# Patient Record
Sex: MALE | Race: WHITE | NOT HISPANIC OR LATINO | Employment: OTHER | URBAN - METROPOLITAN AREA
[De-identification: names, ages, dates, MRNs, and addresses within clinical notes are randomized per-mention and may not be internally consistent; named-entity substitution may affect disease eponyms.]

---

## 2017-01-03 ENCOUNTER — THERAPY VISIT (OUTPATIENT)
Dept: PHYSICAL THERAPY | Facility: CLINIC | Age: 82
End: 2017-01-03
Payer: COMMERCIAL

## 2017-01-03 DIAGNOSIS — M21.372 LEFT FOOT DROP: Primary | ICD-10-CM

## 2017-01-03 PROCEDURE — 97112 NEUROMUSCULAR REEDUCATION: CPT | Mod: GP | Performed by: PHYSICAL THERAPIST

## 2017-01-03 PROCEDURE — 97140 MANUAL THERAPY 1/> REGIONS: CPT | Mod: GP | Performed by: PHYSICAL THERAPIST

## 2017-01-03 PROCEDURE — 97110 THERAPEUTIC EXERCISES: CPT | Mod: GP | Performed by: PHYSICAL THERAPIST

## 2017-01-06 ENCOUNTER — ANTICOAGULATION THERAPY VISIT (OUTPATIENT)
Dept: ANTICOAGULATION | Facility: CLINIC | Age: 82
End: 2017-01-06

## 2017-01-06 DIAGNOSIS — I48.91 ATRIAL FIBRILLATION, UNSPECIFIED TYPE (H): Primary | ICD-10-CM

## 2017-01-06 DIAGNOSIS — I48.91 ATRIAL FIBRILLATION, UNSPECIFIED TYPE (H): ICD-10-CM

## 2017-01-06 DIAGNOSIS — Z79.01 LONG-TERM (CURRENT) USE OF ANTICOAGULANTS: ICD-10-CM

## 2017-01-06 LAB — INR PPP: 2.27 (ref 0.86–1.14)

## 2017-01-11 DIAGNOSIS — N18.30 CKD (CHRONIC KIDNEY DISEASE) STAGE 3, GFR 30-59 ML/MIN (H): Primary | ICD-10-CM

## 2017-01-11 NOTE — NURSING NOTE
Labs per clinic 2A protocol.  CKD 3  Last OV: New patient  Kristen Cortés LPN  Nephrology  [facility]  [phone]

## 2017-01-17 ENCOUNTER — THERAPY VISIT (OUTPATIENT)
Dept: PHYSICAL THERAPY | Facility: CLINIC | Age: 82
End: 2017-01-17
Payer: COMMERCIAL

## 2017-01-17 DIAGNOSIS — M21.372 LEFT FOOT DROP: Primary | ICD-10-CM

## 2017-01-17 PROCEDURE — 97140 MANUAL THERAPY 1/> REGIONS: CPT | Mod: GP | Performed by: PHYSICAL THERAPIST

## 2017-01-17 PROCEDURE — 97112 NEUROMUSCULAR REEDUCATION: CPT | Mod: GP | Performed by: PHYSICAL THERAPIST

## 2017-01-17 PROCEDURE — 97110 THERAPEUTIC EXERCISES: CPT | Mod: GP | Performed by: PHYSICAL THERAPIST

## 2017-01-25 ENCOUNTER — ANTICOAGULATION THERAPY VISIT (OUTPATIENT)
Dept: ANTICOAGULATION | Facility: CLINIC | Age: 82
End: 2017-01-25

## 2017-01-25 ENCOUNTER — OFFICE VISIT (OUTPATIENT)
Dept: NEPHROLOGY | Facility: CLINIC | Age: 82
End: 2017-01-25
Attending: INTERNAL MEDICINE
Payer: COMMERCIAL

## 2017-01-25 VITALS
WEIGHT: 151.8 LBS | TEMPERATURE: 98.4 F | OXYGEN SATURATION: 98 % | HEART RATE: 77 BPM | DIASTOLIC BLOOD PRESSURE: 59 MMHG | HEIGHT: 68 IN | BODY MASS INDEX: 23.01 KG/M2 | SYSTOLIC BLOOD PRESSURE: 99 MMHG

## 2017-01-25 DIAGNOSIS — N18.5 CKD (CHRONIC KIDNEY DISEASE) STAGE 5, GFR LESS THAN 15 ML/MIN (H): Primary | ICD-10-CM

## 2017-01-25 DIAGNOSIS — Z79.01 LONG-TERM (CURRENT) USE OF ANTICOAGULANTS: ICD-10-CM

## 2017-01-25 DIAGNOSIS — I48.91 ATRIAL FIBRILLATION, UNSPECIFIED TYPE (H): Primary | ICD-10-CM

## 2017-01-25 DIAGNOSIS — N18.30 CKD (CHRONIC KIDNEY DISEASE) STAGE 3, GFR 30-59 ML/MIN (H): ICD-10-CM

## 2017-01-25 DIAGNOSIS — I48.91 ATRIAL FIBRILLATION, UNSPECIFIED TYPE (H): ICD-10-CM

## 2017-01-25 DIAGNOSIS — R70.0 ELEVATED ERYTHROCYTE SEDIMENTATION RATE: ICD-10-CM

## 2017-01-25 LAB
ALBUMIN SERPL-MCNC: 3.4 G/DL (ref 3.4–5)
ALBUMIN UR-MCNC: NEGATIVE MG/DL
ANION GAP SERPL CALCULATED.3IONS-SCNC: 8 MMOL/L (ref 3–14)
APPEARANCE UR: ABNORMAL
BILIRUB UR QL STRIP: NEGATIVE
BUN SERPL-MCNC: 41 MG/DL (ref 7–30)
CALCIUM SERPL-MCNC: 8.9 MG/DL (ref 8.5–10.1)
CHLORIDE SERPL-SCNC: 108 MMOL/L (ref 94–109)
CO2 SERPL-SCNC: 25 MMOL/L (ref 20–32)
COLOR UR AUTO: YELLOW
CREAT SERPL-MCNC: 1.23 MG/DL (ref 0.66–1.25)
CREAT UR-MCNC: 159 MG/DL
CRP SERPL-MCNC: <2.9 MG/L (ref 0–8)
ERYTHROCYTE [DISTWIDTH] IN BLOOD BY AUTOMATED COUNT: 15.4 % (ref 10–15)
ERYTHROCYTE [SEDIMENTATION RATE] IN BLOOD BY WESTERGREN METHOD: 80 MM/H (ref 0–20)
FERRITIN SERPL-MCNC: 46 NG/ML (ref 26–388)
GFR SERPL CREATININE-BSD FRML MDRD: 56 ML/MIN/1.7M2
GLUCOSE SERPL-MCNC: 147 MG/DL (ref 70–99)
GLUCOSE UR STRIP-MCNC: NEGATIVE MG/DL
HCT VFR BLD AUTO: 32.9 % (ref 40–53)
HGB BLD-MCNC: 10.4 G/DL (ref 13.3–17.7)
HGB UR QL STRIP: NEGATIVE
HYALINE CASTS #/AREA URNS LPF: 11 /LPF (ref 0–2)
INR PPP: 1.67 (ref 0.86–1.14)
IRON SATN MFR SERPL: 24 % (ref 15–46)
IRON SERPL-MCNC: 75 UG/DL (ref 35–180)
KETONES UR STRIP-MCNC: NEGATIVE MG/DL
LEUKOCYTE ESTERASE UR QL STRIP: NEGATIVE
MCH RBC QN AUTO: 29.6 PG (ref 26.5–33)
MCHC RBC AUTO-ENTMCNC: 31.6 G/DL (ref 31.5–36.5)
MCV RBC AUTO: 94 FL (ref 78–100)
MUCOUS THREADS #/AREA URNS LPF: PRESENT /LPF
NITRATE UR QL: NEGATIVE
PH UR STRIP: 5 PH (ref 5–7)
PHOSPHATE SERPL-MCNC: 2.1 MG/DL (ref 2.5–4.5)
PLATELET # BLD AUTO: 203 10E9/L (ref 150–450)
POTASSIUM SERPL-SCNC: 4.2 MMOL/L (ref 3.4–5.3)
PROT UR-MCNC: 0.23 G/L
PROT/CREAT 24H UR: 0.14 G/G CR (ref 0–0.2)
PTH-INTACT SERPL-MCNC: 43 PG/ML (ref 12–72)
RBC # BLD AUTO: 3.51 10E12/L (ref 4.4–5.9)
RBC #/AREA URNS AUTO: 1 /HPF (ref 0–2)
SODIUM SERPL-SCNC: 142 MMOL/L (ref 133–144)
SP GR UR STRIP: 1.02 (ref 1–1.03)
SQUAMOUS #/AREA URNS AUTO: <1 /HPF (ref 0–1)
TIBC SERPL-MCNC: 311 UG/DL (ref 240–430)
URN SPEC COLLECT METH UR: ABNORMAL
UROBILINOGEN UR STRIP-MCNC: 0 MG/DL (ref 0–2)
WBC # BLD AUTO: 7 10E9/L (ref 4–11)
WBC #/AREA URNS AUTO: 1 /HPF (ref 0–2)

## 2017-01-25 PROCEDURE — 83970 ASSAY OF PARATHORMONE: CPT | Performed by: INTERNAL MEDICINE

## 2017-01-25 PROCEDURE — 81001 URINALYSIS AUTO W/SCOPE: CPT | Performed by: INTERNAL MEDICINE

## 2017-01-25 PROCEDURE — 80069 RENAL FUNCTION PANEL: CPT | Performed by: INTERNAL MEDICINE

## 2017-01-25 PROCEDURE — 83540 ASSAY OF IRON: CPT | Performed by: INTERNAL MEDICINE

## 2017-01-25 PROCEDURE — 85027 COMPLETE CBC AUTOMATED: CPT | Performed by: INTERNAL MEDICINE

## 2017-01-25 PROCEDURE — 82728 ASSAY OF FERRITIN: CPT | Performed by: INTERNAL MEDICINE

## 2017-01-25 PROCEDURE — 83550 IRON BINDING TEST: CPT | Performed by: INTERNAL MEDICINE

## 2017-01-25 PROCEDURE — 84156 ASSAY OF PROTEIN URINE: CPT | Performed by: INTERNAL MEDICINE

## 2017-01-25 PROCEDURE — 85610 PROTHROMBIN TIME: CPT | Performed by: INTERNAL MEDICINE

## 2017-01-25 PROCEDURE — 00000402 ZZHCL STATISTIC TOTAL PROTEIN: Performed by: INTERNAL MEDICINE

## 2017-01-25 PROCEDURE — 36415 COLL VENOUS BLD VENIPUNCTURE: CPT | Performed by: INTERNAL MEDICINE

## 2017-01-25 PROCEDURE — 82306 VITAMIN D 25 HYDROXY: CPT | Performed by: INTERNAL MEDICINE

## 2017-01-25 PROCEDURE — 99213 OFFICE O/P EST LOW 20 MIN: CPT | Mod: ZF

## 2017-01-25 ASSESSMENT — PAIN SCALES - GENERAL: PAINLEVEL: NO PAIN (0)

## 2017-01-25 NOTE — NURSING NOTE
"Chief Complaint   Patient presents with     Consult For     FOLLOW UP CKD STAGE 5       Initial BP 99/59 mmHg  Pulse 77  Temp(Src) 98.4  F (36.9  C) (Oral)  Ht 1.727 m (5' 8\")  Wt 68.856 kg (151 lb 12.8 oz)  BMI 23.09 kg/m2  SpO2 98% Estimated body mass index is 23.09 kg/(m^2) as calculated from the following:    Height as of this encounter: 1.727 m (5' 8\").    Weight as of this encounter: 68.856 kg (151 lb 12.8 oz).  BP completed using cuff size: denise BRUCE CMA    "

## 2017-01-25 NOTE — Clinical Note
1/25/2017       RE: Noe Florence  [address] [facility] [address]     Dear Colleague,    Thank you for referring your patient, Noe Florence, to the [facility] NEPHROLOGY at [facility]. Please see a copy of my visit note below.    REASON FOR VISIT:  Mr. Florence is an 81-year-old gentleman referred by Dr. Baird for evaluation of CKD.       HISTORY OF PRESENT ILLNESS:  The patient has a creatinine of 1.2 which has been stable in that range since 2005.  However, presumably he has lost muscle mass over that period of time, so the creatinine may have been rising slowly.  Urinalysis has been bland.  He does not take over-the-counter medications.  He does not have voiding problems but is on doxazosin for voiding symptoms, so presumably this has been a complaint in the past.  He has a long history of cardiovascular disease including bypass grafting, drug-eluting stents, V-tach with ICD placement, history of atrial fibrillation and atrial flutter, status post a cavotricuspid isthmus ablation in 2014, and a right atrial appendage ablation 3 months later in 2014.  He is followed in Dr. Baird's clinic for tachycardia and has been doing well.       Today in clinic he is hypotensive with blood pressures in the 90s.  He denies dizziness but has been fatigued.  He is on a number of medications which might lower blood pressures including metoprolol 25 b.i.d., doxazosin and Zoloft.       The patient has unintentional weight loss of 15 pounds since September documented in the clinic chart.  He is on Remeron.  He has a history of colon cancer and is being reevaluated for that.  He is on an antidepressant because of poor appetite and symptoms of depression.       REVIEW OF SYSTEMS:  The patient denies chest pain, shortness of breath or dizziness.  He does say he has balance problems which he thinks might be inner ear-related.  He also has tinnitus which has been a chronic problem for him.  He  has had no recent cardiac events.  He does have bilateral lower extremity swelling.  He has no problems with diarrhea.  Appetite is poor.       PAST MEDICAL HISTORY:   1.  Hypertension.    2.  CKD, stage III.   3.  Coronary artery disease, status post CABG x2, JEREMY x2.   4.  History of DVT, on chronic anticoagulation.   5.  Colon cancer, status post colectomy.   6.  Polymorphic V-tach.   7.  Ischemic cardiomyopathy.   8.  History of SVT.    9.  Status post multiple ablations for atrial flutter.   10.  Status post right and left knee surgery.    11.  Status post right rotator cuff surgery.      SOCIAL HISTORY:  He is a retired trade .  He worked at the [employer] for the past 26 years.  He smoked from age 13 to age 33 (20 pack years) but quit at 33.  He is a very good .  He used to play tennis 5-6 times per week until age 79.       FAMILY HISTORY:  His father had coronary artery disease.  No one in the family has renal problems.      CURRENT MEDICATIONS:  See below.      PHYSICAL EXAMINATION:   VITAL SIGNS:  Blood pressure 97/57, heart rate 77, weight 151 pounds, BMI 23.  Weight in September was 165 pounds.    GENERAL:  He appears his stated age.   SKIN:  He has extensive actinic keratoses covering his forehead, arms and face.  It is difficult to determine whether any of these are malignant, but he has a number of suspicious lesions.    LUNGS:  Clear bilaterally.    CARDIAC:  Regular sinus rhythm with a 2/6 systolic ejection murmur.  No left ventricular heave.    ABDOMEN:  Soft.  Multiple skin lesions on the abdomen.  No hepatosplenomegaly.  No bruits over the kidneys.   LOWER EXTREMITIES:  He has 1+ edema bilaterally.  Increased erythema in the lower extremities suggestive of poor circulation.  No chronic skin changes of stasis.    NEUROLOGIC:  His speech is slightly slow, but overall his answers are clear and complete.  He is ambulatory and able to get to the  examining table and has a slightly wide-based gait.       LABORATORY TESTS:  Creatinine 1.2, BUN 42.     Electrolytes/Renal -   Recent Labs   Lab Test  01/25/17   1427  11/16/16   1815  09/26/16   1708  09/19/16   1031   04/10/14   0606   01/16/14   1248   05/12/12   0625   NA  142   --   141  140   < >  141   < >  140   < >  140   POTASSIUM  4.2   --   4.5  4.2   < >  4.2   < >  5.3   < >  4.9   CHLORIDE  108   --   110*  107   < >  104   < >  110*   < >  111*   CO2  25   --   26  26   < >  29   < >  23   < >  21   BUN  41*   --   35*  39*   < >  21   < >  43*   < >  26   CR  1.23   --   1.30*  1.52*   < >  1.27*   < >  1.52*   < >  1.34*   GLC  147*   --   93  112*   < >  147*   < >  96   < >  92   KEITH  8.9   --   9.0  8.9   < >  8.6   < >  9.2   < >  8.7   MAG   --   2.2   --    --    --   1.9   --    --    --   1.8   PHOS  2.1*   --    --    --    --    --    --   3.5   --    --     < > = values in this interval not displayed.       CBC -   Recent Labs   Lab Test  01/25/17 1427 09/30/16   1607  09/19/16   1031   WBC  7.0  8.5  8.0   HGB  10.4*  11.0*  10.7*   PLT  203  261  206       LFTs -   Recent Labs   Lab Test  01/25/17   1427 09/26/16   1708  09/19/16   1031  03/16/16   1133   ALKPHOS   --   124  113  96   BILITOTAL   --   0.6  0.4  0.3   ALT   --   16  15  18   AST   --   15  14  17   PROTTOTAL   --   7.9  7.5  7.0   ALBUMIN  3.4  3.2*  3.4  3.2*       Coags -   Recent Labs   Lab Test  01/25/17   1427 01/06/17   1557  12/19/16   1602   09/09/14   0726   04/15/12   1603   INR  1.67*  2.27*  2.03*   < >  2.83*   < >  1.15*   PTT   --    --    --    --   35   --   31    < > = values in this interval not displayed.       Iron Panel -   Recent Labs   Lab Test  01/25/17   1427  06/17/14   1211   08/26/09   1045   IRON  75  45   --   44   IRONSAT  24  14*   --    --    PEBBLES  46  51   < >  262    < > = values in this interval not displayed.       Endocrine -   Recent Labs   Lab Test  11/16/16   6649   02/27/12   1318  01/27/11   1432  08/26/09   1045  01/16/09   0523   A1C   --   5.7  6.0   --   6.2*   TSH  0.94   --    --   0.93   --           IMAGING:  Abdominal CT scan in 09/2016 shows slight cortical irregularity and atrophy bilaterally in the kidneys, but no hydronephrosis.  He has extensive atherosclerotic changes.       IMPRESSION:   1.  Chronic kidney disease.  The patient has stable CKD, although the stable creatinine might be misleading since the patient has lost muscle mass in the last 10 years.  Of note, he was a  2 years ago and now is barely able to walk across the room.  I doubt his CKD is contributing to his overall fatigue and weight loss.  Other parameters such as anemia and parathyroid hormone are in the normal range.  Urinalysis is bland.  His recent CT scan suggests chronically small kidneys, most consistent with CKD. Urinalysis shows hyaline casts which is consistent with renal underperfusion.  I would like to avoid hypotension if possible.  No further evaluation of CKD is indicated.      2.  Hypotension.  He is on a beta blocker and doxazosin.  I suggested he stop the doxazosin even though I do not think it is contributing much.  I asked him to contact his cardiologist about stopping the metoprolol.  He might have a risk of an arrhythmia which would outweigh the benefit of an improved blood pressure.  However, he might feel better if his systolic pressure was higher.       3.  Weight loss.  He has unintentional weight loss documented of 15 pounds over the past 4 months.  We discussed a general differential for this including the possibility of recurrent cancer, depression, or advancing heart disease contributing to weight loss.  His wife will discuss this further with their primary care provider who is already addressing most of these issues.  PCP to pursue possibilities including depression, cardiac disease, malignancy.  I do not think CKD is contributing to his weight loss.      4.  Anemia.  Hemoglobin is stable at 10.4.  It might be decreased because of CKD.  He would not be a candidate for EPO with this hemoglobin.  We will continue to monitor.      PLAN:   1.  Monitor creatinine.   2. Stop beta blocker if possible.   3.  Stop doxazosin and monitor for voiding difficulty.    4.  Agree with PT.      Current Outpatient Prescriptions   Medication Sig Dispense Refill     cyanocobalamin (VITAMIN  B-12) 1000 MCG tablet Take 2 tablets (2,000 mcg) by mouth daily 180 tablet 3     sulfamethoxazole-trimethoprim (BACTRIM,SEPTRA) 400-80 MG per tablet Take 1 tablet by mouth 2 times daily 14 tablet 1     doxazosin (CARDURA) 2 MG tablet Take 1/2 tab in the morning and 1/2 tab in the evening 90 tablet 3     atorvastatin (LIPITOR) 40 MG tablet Take 1 tablet (40 mg) by mouth daily 90 tablet 3     metoprolol (LOPRESSOR) 25 MG tablet Take 1 tablet (25 mg) by mouth 2 times daily 180 tablet 3     warfarin (COUMADIN) 5 MG tablet 7.5 mg on Wed; 5 mg all other days  or as instructed by coumadin clinic. 35 tablet 5     ciclopirox (LOPROX) 0.77 % cream Apply topically 2 times daily To feet and toenails. 90 g 6     triamcinolone (KENALOG) 0.1 % cream Apply topically 2 times daily For up to two weeks in a row 80 g 3     ketoconazole (NIZORAL) 2 % cream Apply topically daily On hold 60 g 3     loratadine (CLARITIN) 10 MG tablet Take 10 mg by mouth as needed        fluticasone (FLONASE) 50 MCG/ACT nasal spray Spray 2 sprays into both nostrils daily (Patient taking differently: Spray 2 sprays into both nostrils as needed ) 3 Package 0     Multiple Vitamins-Minerals (CENTRUM SILVER) per tablet Take 1 tablet by mouth daily. AM        aspirin 81 MG tablet Take 1 tablet by mouth daily.       mirtazapine (REMERON) 15 MG tablet Take 15 mg by mouth At Bedtime.       sertraline (ZOLOFT) 100 MG tablet Take 100 mg by mouth every evening.          ALIA QUIGLEY MD             D: 01/25/2017 19:02   T: 01/26/2017 08:34   MT:  DP      Name:     LEROY MACHADO   MRN:      [id number]        Account:      [id number]   :      1935           Service Date: 2017      Document: [id number]

## 2017-01-25 NOTE — PATIENT INSTRUCTIONS
1.  BP is low  2.  Contact cardiologist re stopping metoprolol  3. Consider stopping cardura  4.  You have chronic kidney disease that is currently stable  5.  Avoid over-the-counter meds such as ibuprofen or naprosyn  6.  See a dermatologist

## 2017-01-25 NOTE — PROGRESS NOTES
ANTICOAGULATION FOLLOW-UP CLINIC VISIT    Patient Name:  Noe Florence  Date:  1/25/2017  Contact Type:  Telephone    SUBJECTIVE:        OBJECTIVE    INR   Date Value Ref Range Status   01/25/2017 1.67* 0.86 - 1.14 Final       ASSESSMENT / PLAN  No question data found.  Anticoagulation Summary as of 1/25/2017     INR goal 2.0-3.0   Selected INR 1.67! (1/25/2017)   Maintenance plan 2.5 mg (5 mg x 0.5) on Mon; 5 mg (5 mg x 1) all other days   Full instructions 1/25: 7.5 mg; Otherwise 2.5 mg on Mon; 5 mg all other days   Weekly total 32.5 mg   Plan last modified Roya Manning RN (12/22/2016)   Next INR check 1/30/2017   Priority INR   Target end date Indefinite    Indications   Atrial fibrillation (H) [I48.91] [I48.91]  Long-term (current) use of anticoagulants [Z79.01] [Z79.01]         Anticoagulation Episode Summary     INR check location     Preferred lab     Send INR reminders to [facility]    Comments Patient contact number: [phone]   Can leave results with Rica (friend)      Anticoagulation Care Providers     Provider Role Specialty Phone number    Deedee Baird MD Responsible Cardiology [phone]            See the Encounter Report to view Anticoagulation Flowsheet and Dosing Calendar (Go to Encounters tab in chart review, and find the Anticoagulation Therapy Visit)    LM for patient.    Leena Abebe RN

## 2017-01-25 NOTE — MR AVS SNAPSHOT
After Visit Summary   1/25/2017    Noe Florence    MRN: [id number]           Patient Information     Date Of Birth          1935        Visit Information        Provider Department      1/25/2017 2:40 PM Kathy Oneil MD [facility] Nephrology        Today's Diagnoses     CKD (chronic kidney disease) stage 5, GFR less than 15 ml/min (H)    -  1       Care Instructions    1.  BP is low  2.  Contact cardiologist re stopping metoprolol  3. Consider stopping cardura  4.  You have chronic kidney disease that is currently stable  5.  Avoid over-the-counter meds such as ibuprofen or naprosyn  6.  See a dermatologist            Follow-ups after your visit        Your next 10 appointments already scheduled     Jan 30, 2017  2:30 PM   Ech Complete with [address] [facility] ([facility])    [address] [place] [address] [phone]           1.  Please bring or wear a comfortable two-piece outfit. 2.  You may eat, drink and take your normal medicines. 3.  For any questions that cannot be answered, please contact the ordering physician            Jan 31, 2017 12:50 PM   ASHLEY Extremity with Cristal Francois PT   [facility]    [address] [place] [id number]               Mar 24, 2017 10:30 AM   LAB with [facility] ([facility])    [address] [place] [address] [phone]           Patient must bring picture ID.  Patient should be prepared to give a urine specimen  Please do not eat 10-12 hours before your appointment if you are coming in fasting for labs on lipids, cholesterol, or glucose (sugar).  Pregnant women should follow their Care Team instructions. Water with medications is okay. Do not drink coffee or other fluids.   If you have concerns about taking  your medications, please ask at office or  if scheduling via [employer], send a message by clicking on Secure Messaging, Message Your Care Team.            Mar 24, 2017 11:00 AM   (Arrive by 10:45 AM)   CT CHEST/ABDOMEN/PELVIS W CONTRAST with UCCT1   [facility] [place] [facility] ([facility])    [address] [id number] [phone]           Please bring any scans or X-rays taken at other hospitals, if similar tests were done. Also bring a list of your medicines, including vitamins, minerals and over-the-counter drugs. It is safest to leave personal items at home.  Be sure to tell your doctor:   If you have any allergies.   If there s any chance you are pregnant.   If you are breastfeeding.   If you have any special needs.  You may have contrast for this exam. To prepare:   Do not eat or drink for 2 hours before your exam. If you need to take medicine, you may take it with small sips of water. (We may ask you to take liquid medicine as well.)   The day before your exam, drink extra fluids at least six 8-ounce glasses (unless your doctor tells you to restrict your fluids).  Patients over 70 or patients with diabetes or kidney problems:   If you haven t had a blood test (creatinine test) within the last 30 days, go to your clinic or Diagnostic Imaging Department for this test.  If you have diabetes:   If your kidney function is normal, continue taking your metformin (Avandamet, Glucophage, Glucovance, Metaglip) on the day of your exam.   If your kidney function is abnormal, wait 48 hours before restarting this medicine.  You will have oral contrast for this exam:   You will drink the contrast at home. Get this from your clinic or Diagnostic Imaging Department. Please follow the directions given.  Please wear loose clothing, such as a sweat suit or jogging clothes. Avoid snaps, zippers and other metal. We may ask you to undress and put on a hospital gown.  If you have any questions, please call the Imaging  Department where you will have your exam.            Mar 27, 2017  3:00 PM   (Arrive by 2:45 PM)   Return Visit with Francisco Gilliam MD   [facility] ([facility])    [address]  [address]nd Floor  [facility] [address] [phone]            Jun 19, 2017  1:30 PM   (Arrive by 1:15 PM)   Implanted Defibulator with Uc Cv Device 1   [facility] ([facility])    [address]  3rd Floor  [facility] [address] [phone]            Oct 05, 2017  1:00 PM   RETURN GENERAL with Omero Srinivasan MD   [facility] ([facility])    [address]  [address] [address]  [facility] [id number] [phone]              Who to contact     If you have questions or need follow up information about today's clinic visit or your schedule please contact [facility] NEPH[facility] directly at [phone].  Normal or non-critical lab and imaging results will be communicated to you by MyChart, letter or phone within 4 business days after the clinic has received the results. If you do not hear from us within 7 days, please contact the clinic through MyChart or phone. If you have a critical or abnormal lab result, we will notify you by phone as soon as possible.  Submit refill requests through [employer] or call your pharmacy and they will forward the refill request to us. Please allow 3 business days for your refill to be completed.          Additional Information About Your Visit        [employer] Information     [employer] gives you secure access to your electronic health record. If you see a primary care provider, you can also send messages to your care team and make appointments. If you have questions, please call your primary care clinic.  If you do not have a primary care provider, please call [phone] and they will assist you.        Care EveryWhere ID     This is your Care EveryWhere ID. This could be  "used by other organizations to access your [place] medical records  [id number]        Your Vitals Were     Pulse Temperature Height BMI (Body Mass Index) Pulse Oximetry       77 98.4  F (36.9  C) (Oral) 1.727 m (5' 8\") 23.09 kg/m2 98%        Blood Pressure from Last 3 Encounters:   01/25/17 99/59   12/19/16 119/67   11/28/16 126/82    Weight from Last 3 Encounters:   01/25/17 68.856 kg (151 lb 12.8 oz)   12/19/16 69.582 kg (153 lb 6.4 oz)   11/28/16 69.536 kg (153 lb 4.8 oz)              Today, you had the following     No orders found for display         Today's Medication Changes          These changes are accurate as of: 1/25/17  4:06 PM.  If you have any questions, ask your nurse or doctor.               These medicines have changed or have updated prescriptions.        Dose/Directions    fluticasone 50 MCG/ACT spray   Commonly known as:  FLONASE   This may have changed:    - when to take this  - reasons to take this   Used for:  Unspecified sinusitis (chronic)        Dose:  2 spray   Spray 2 sprays into both nostrils daily   Quantity:  3 Package   Refills:  0                Primary Care Provider Office Phone # Fax #    Roberto Fabiano Sarmiento [phone]        [facility] [address] [facility] [address]  [facility] [address]        Thank you!     Thank you for choosing [facility] NEPHROLOGY  for your care. Our goal is always to provide you with excellent care. Hearing back from our patients is one way we can continue to improve our services. Please take a few minutes to complete the written survey that you may receive in the mail after your visit with us. Thank you!             Your Updated Medication List - Protect others around you: Learn how to safely use, store and throw away your medicines at www.disposemymeds.org.          This list is accurate as of: 1/25/17  4:06 PM.  Always use your most recent med list.                   Brand Name Dispense Instructions for use    aspirin 81 MG tablet     "  Take 1 tablet by mouth daily.       atorvastatin 40 MG tablet    LIPITOR    90 tablet    Take 1 tablet (40 mg) by mouth daily       CENTRUM SILVER per tablet      Take 1 tablet by mouth daily. AM       ciclopirox 0.77 % cream    LOPROX    90 g    Apply topically 2 times daily To feet and toenails.       cyanocobalamin 1000 MCG tablet    vitamin  B-12    180 tablet    Take 2 tablets (2,000 mcg) by mouth daily       doxazosin 2 MG tablet    CARDURA    90 tablet    Take 1/2 tab in the morning and 1/2 tab in the evening       fluticasone 50 MCG/ACT spray    FLONASE    3 Package    Spray 2 sprays into both nostrils daily       ketoconazole 2 % cream    NIZORAL    60 g    Apply topically daily On hold       loratadine 10 MG tablet    CLARITIN     Take 10 mg by mouth as needed       metoprolol 25 MG tablet    LOPRESSOR    180 tablet    Take 1 tablet (25 mg) by mouth 2 times daily       mirtazapine 15 MG tablet    REMERON     Take 15 mg by mouth At Bedtime.       sulfamethoxazole-trimethoprim 400-80 MG per tablet    BACTRIM/SEPTRA    14 tablet    Take 1 tablet by mouth 2 times daily       triamcinolone 0.1 % cream    KENALOG    80 g    Apply topically 2 times daily For up to two weeks in a row       warfarin 5 MG tablet    COUMADIN    35 tablet    7.5 mg on Wed; 5 mg all other days  or as instructed by coumadin clinic.       ZOLOFT 100 MG tablet   Generic drug:  sertraline      Take 100 mg by mouth every evening.

## 2017-01-26 ENCOUNTER — CARE COORDINATION (OUTPATIENT)
Dept: CARDIOLOGY | Facility: CLINIC | Age: 82
End: 2017-01-26

## 2017-01-26 DIAGNOSIS — I10 HYPERTENSION: Primary | ICD-10-CM

## 2017-01-26 LAB
ALBUMIN SERPL ELPH-MCNC: 3.8 G/DL (ref 3.7–5.1)
ALPHA1 GLOB SERPL ELPH-MCNC: 0.4 G/DL (ref 0.2–0.4)
ALPHA2 GLOB SERPL ELPH-MCNC: 0.8 G/DL (ref 0.5–0.9)
B-GLOBULIN SERPL ELPH-MCNC: 1.2 G/DL (ref 0.6–1)
DEPRECATED CALCIDIOL+CALCIFEROL SERPL-MC: 46 UG/L (ref 20–75)
GAMMA GLOB SERPL ELPH-MCNC: 1.3 G/DL (ref 0.7–1.6)
M PROTEIN SERPL ELPH-MCNC: 0.1 G/DL
PROT PATTERN SERPL ELPH-IMP: ABNORMAL

## 2017-01-26 NOTE — PROGRESS NOTES
REASON FOR VISIT:  Mr. Florence is an 81-year-old gentleman referred by Dr. Baird for evaluation of CKD.       HISTORY OF PRESENT ILLNESS:  The patient has a creatinine of 1.2 which has been stable in that range since 2005.  However, presumably he has lost muscle mass over that period of time, so the creatinine may have been rising slowly.  Urinalysis has been bland.  He does not take over-the-counter medications.  He does not have voiding problems but is on doxazosin for voiding symptoms, so presumably this has been a complaint in the past.  He has a long history of cardiovascular disease including bypass grafting, drug-eluting stents, V-tach with ICD placement, history of atrial fibrillation and atrial flutter, status post a cavotricuspid isthmus ablation in 2014, and a right atrial appendage ablation 3 months later in 2014.  He is followed in Dr. Baird's clinic for tachycardia and has been doing well.       Today in clinic he is hypotensive with blood pressures in the 90s.  He denies dizziness but has been fatigued.  He is on a number of medications which might lower blood pressures including metoprolol 25 b.i.d., doxazosin and Zoloft.       The patient has unintentional weight loss of 15 pounds since September documented in the clinic chart.  He is on Remeron.  He has a history of colon cancer and is being reevaluated for that.  He is on an antidepressant because of poor appetite and symptoms of depression.       REVIEW OF SYSTEMS:  The patient denies chest pain, shortness of breath or dizziness.  He does say he has balance problems which he thinks might be inner ear-related.  He also has tinnitus which has been a chronic problem for him.  He has had no recent cardiac events.  He does have bilateral lower extremity swelling.  He has no problems with diarrhea.  Appetite is poor.  Comprehensive ROS completed and neg except as above.     PAST MEDICAL HISTORY:   1.  Hypertension.    2.  CKD, stage III.   3.  Coronary  artery disease, status post CABG x2, JEREMY x2.   4.  History of DVT, on chronic anticoagulation.   5.  Colon cancer, status post colectomy.   6.  Polymorphic V-tach.   7.  Ischemic cardiomyopathy.   8.  History of SVT.    9.  Status post multiple ablations for atrial flutter.   10.  Status post right and left knee surgery.    11.  Status post right rotator cuff surgery.      SOCIAL HISTORY:  He is a retired trade .  He worked at the [employer] for the past 26 years.  He smoked from age 13 to age 33 (20 pack years) but quit at 33.  He is a very good .  He used to play tennis 5-6 times per week until age 79.       FAMILY HISTORY:  His father had coronary artery disease.  No one in the family has renal problems.      CURRENT MEDICATIONS:  See below.      PHYSICAL EXAMINATION:   VITAL SIGNS:  Blood pressure 97/57, heart rate 77, weight 151 pounds, BMI 23.  Weight in September was 165 pounds.    GENERAL:  He appears his stated age.   SKIN:  He has extensive actinic keratoses covering his forehead, arms and face.  It is difficult to determine whether any of these are malignant, but he has a number of suspicious lesions. HEENT: several missing teeth   NECK: no carotid bruits  LUNGS:  Clear bilaterally.    CARDIAC:  Regular sinus rhythm with a 2/6 systolic ejection murmur.  No left ventricular heave.    ABDOMEN:  Soft.  Multiple skin lesions on the abdomen.  No hepatosplenomegaly.  No bruits over the kidneys.   LOWER EXTREMITIES:  He has 1+ edema bilaterally.  Increased erythema in the lower extremities suggestive of poor circulation.  No chronic skin changes of stasis.    NEUROLOGIC:  His speech is slightly slow, but overall his answers are clear and complete.  He is ambulatory and able to get to the examining table and has a slightly wide-based gait.       LABORATORY TESTS:  Creatinine 1.2, BUN 42.     Electrolytes/Renal -   Recent Labs   Lab Test  01/25/17   1427  11/16/16    1815  09/26/16   1708  09/19/16   1031   04/10/14   0606   01/16/14   1248   05/12/12   0625   NA  142   --   141  140   < >  141   < >  140   < >  140   POTASSIUM  4.2   --   4.5  4.2   < >  4.2   < >  5.3   < >  4.9   CHLORIDE  108   --   110*  107   < >  104   < >  110*   < >  111*   CO2  25   --   26  26   < >  29   < >  23   < >  21   BUN  41*   --   35*  39*   < >  21   < >  43*   < >  26   CR  1.23   --   1.30*  1.52*   < >  1.27*   < >  1.52*   < >  1.34*   GLC  147*   --   93  112*   < >  147*   < >  96   < >  92   KEITH  8.9   --   9.0  8.9   < >  8.6   < >  9.2   < >  8.7   MAG   --   2.2   --    --    --   1.9   --    --    --   1.8   PHOS  2.1*   --    --    --    --    --    --   3.5   --    --     < > = values in this interval not displayed.       CBC -   Recent Labs   Lab Test  01/25/17   1427 09/30/16   1607  09/19/16   1031   WBC  7.0  8.5  8.0   HGB  10.4*  11.0*  10.7*   PLT  203  261  206       LFTs -   Recent Labs   Lab Test  01/25/17   1427 09/26/16   1708  09/19/16   1031  03/16/16   1133   ALKPHOS   --   124  113  96   BILITOTAL   --   0.6  0.4  0.3   ALT   --   16  15  18   AST   --   15  14  17   PROTTOTAL   --   7.9  7.5  7.0   ALBUMIN  3.4  3.2*  3.4  3.2*       Coags -   Recent Labs   Lab Test  01/25/17   1427  01/06/17   1557  12/19/16   1602   09/09/14   0726   04/15/12   1603   INR  1.67*  2.27*  2.03*   < >  2.83*   < >  1.15*   PTT   --    --    --    --   35   --   31    < > = values in this interval not displayed.       Iron Panel -   Recent Labs   Lab Test  01/25/17   1427  06/17/14   1211   08/26/09   1045   IRON  75  45   --   44   IRONSAT  24  14*   --    --    PEBBLES  46  51   < >  262    < > = values in this interval not displayed.       Endocrine -   Recent Labs   Lab Test  11/16/16   1815  02/27/12   1318  01/27/11   1432  08/26/09   1045  01/16/09   0523   A1C   --   5.7  6.0   --   6.2*   TSH  0.94   --    --   0.93   --           IMAGING:  Abdominal CT scan in 09/2016  shows slight cortical irregularity and atrophy bilaterally in the kidneys, but no hydronephrosis.  He has extensive atherosclerotic changes.       IMPRESSION:   1.  Chronic kidney disease.  The patient has stable CKD, although the stable creatinine might be misleading since the patient has lost muscle mass in the last 10 years.  Of note, he was a  2 years ago and now is barely able to walk across the room.  I doubt his CKD is contributing to his overall fatigue and weight loss.  Other parameters such as anemia and parathyroid hormone are in the normal range.  Urinalysis is bland.  His recent CT scan suggests chronically small kidneys, most consistent with CKD. Urinalysis shows hyaline casts which is consistent with renal underperfusion.  I would like to avoid hypotension if possible.  No further evaluation of CKD is indicated.      2.  Hypotension.  He is on a beta blocker and doxazosin.  I suggested he stop the doxazosin even though I do not think it is contributing much.  I asked him to contact his cardiologist about stopping the metoprolol.  He might have a risk of an arrhythmia which would outweigh the benefit of an improved blood pressure.  However, he might feel better if his systolic pressure was higher.       3.  Weight loss.  He has unintentional weight loss documented of 15 pounds over the past 4 months.  We discussed a general differential for this including the possibility of recurrent cancer, depression, or advancing heart disease contributing to weight loss.  His wife will discuss this further with their primary care provider who is already addressing most of these issues.  PCP to pursue possibilities including depression, cardiac disease, malignancy.  I do not think CKD is contributing to his weight loss.     4.  Anemia.  Hemoglobin is stable at 10.4.  It might be decreased because of CKD.  He would not be a candidate for EPO with this hemoglobin.  We will continue to monitor.      PLAN:    1.  Monitor creatinine.   2. Stop beta blocker if possible.   3.  Stop doxazosin and monitor for voiding difficulty.    4.  Agree with PT.      Current Outpatient Prescriptions   Medication Sig Dispense Refill     cyanocobalamin (VITAMIN  B-12) 1000 MCG tablet Take 2 tablets (2,000 mcg) by mouth daily 180 tablet 3     sulfamethoxazole-trimethoprim (BACTRIM,SEPTRA) 400-80 MG per tablet Take 1 tablet by mouth 2 times daily 14 tablet 1     doxazosin (CARDURA) 2 MG tablet Take 1/2 tab in the morning and 1/2 tab in the evening 90 tablet 3     atorvastatin (LIPITOR) 40 MG tablet Take 1 tablet (40 mg) by mouth daily 90 tablet 3     metoprolol (LOPRESSOR) 25 MG tablet Take 1 tablet (25 mg) by mouth 2 times daily 180 tablet 3     warfarin (COUMADIN) 5 MG tablet 7.5 mg on Wed; 5 mg all other days  or as instructed by coumadin clinic. 35 tablet 5     ciclopirox (LOPROX) 0.77 % cream Apply topically 2 times daily To feet and toenails. 90 g 6     triamcinolone (KENALOG) 0.1 % cream Apply topically 2 times daily For up to two weeks in a row 80 g 3     ketoconazole (NIZORAL) 2 % cream Apply topically daily On hold 60 g 3     loratadine (CLARITIN) 10 MG tablet Take 10 mg by mouth as needed        fluticasone (FLONASE) 50 MCG/ACT nasal spray Spray 2 sprays into both nostrils daily (Patient taking differently: Spray 2 sprays into both nostrils as needed ) 3 Package 0     Multiple Vitamins-Minerals (CENTRUM SILVER) per tablet Take 1 tablet by mouth daily. AM        aspirin 81 MG tablet Take 1 tablet by mouth daily.       mirtazapine (REMERON) 15 MG tablet Take 15 mg by mouth At Bedtime.       sertraline (ZOLOFT) 100 MG tablet Take 100 mg by mouth every evening.          ALIA QUIGLEY MD             D: 2017 19:02   T: 2017 08:34   MT: DP      Name:     LEROY MACHADO   MRN:      [id number]        Account:      [id number]   :      1935           Service Date: 2017      Document: [id number]

## 2017-01-26 NOTE — PROGRESS NOTES
Received call from pt stating he was by nephrology yesterday (1/25/17), Dr. Quigley, see below - pt is wondering if anything has been decided about stopping the metoprolol. BP 90's/50's HR 70's. He said he has no symptoms of lightheadedness or dizziness.     IMPRESSION:    2.  Hypotension.  He is on a beta blocker and doxazosin.  I suggested he stop the doxazosin even though I do not think it is contributing much.  I asked him to contact his cardiologist about stopping the metoprolol.  He might have a risk of an arrhythmia which would outweigh the benefit of an improved blood pressure.  However, he might feel better if his systolic pressure was higher.      PLAN:    2. Stop beta blocker if possible.      ALIA QUIGLEY MD         He would like a call back [phone].  Will route to Lashawn Cool RN and Dr. Baird

## 2017-01-27 NOTE — PROGRESS NOTES
Spoke patient. He still states he is asymptomatic with these low blood pressures. This is the only low blood pressure reading - he is typically 110-120s/70s. He will continue on same medication regimen. Told patient we base decision on medication changes on symptoms rather than the number with hypotension.    Blood pressure cuff Rx sent to preferred pharmacy. Patient will look at cost and decide whether he will purchase.    Patient verbalized understanding and is in agreement to the plan.

## 2017-01-30 ENCOUNTER — RADIANT APPOINTMENT (OUTPATIENT)
Dept: CARDIOLOGY | Facility: CLINIC | Age: 82
End: 2017-01-30
Attending: INTERNAL MEDICINE

## 2017-01-30 DIAGNOSIS — Z98.890 S/P ABLATION OF ATRIAL FLUTTER: ICD-10-CM

## 2017-01-30 DIAGNOSIS — I47.29 NSVT (NONSUSTAINED VENTRICULAR TACHYCARDIA) (H): ICD-10-CM

## 2017-01-30 DIAGNOSIS — Z86.79 S/P ABLATION OF ATRIAL FLUTTER: ICD-10-CM

## 2017-01-31 ENCOUNTER — THERAPY VISIT (OUTPATIENT)
Dept: PHYSICAL THERAPY | Facility: CLINIC | Age: 82
End: 2017-01-31
Payer: COMMERCIAL

## 2017-01-31 DIAGNOSIS — M21.372 LEFT FOOT DROP: Primary | ICD-10-CM

## 2017-01-31 PROCEDURE — 97112 NEUROMUSCULAR REEDUCATION: CPT | Mod: GP | Performed by: PHYSICAL THERAPIST

## 2017-01-31 PROCEDURE — 97140 MANUAL THERAPY 1/> REGIONS: CPT | Mod: GP | Performed by: PHYSICAL THERAPIST

## 2017-01-31 PROCEDURE — 97110 THERAPEUTIC EXERCISES: CPT | Mod: GP | Performed by: PHYSICAL THERAPIST

## 2017-01-31 NOTE — PROGRESS NOTES
Subjective:    HPI                    Objective:    System    Physical Exam    General     ROS    Assessment/Plan:      DISCHARGE REPORT    Progress reporting period is from 11- to 1-.       SUBJECTIVE  Subjective changes noted by patient: Patient reports that he has more confidence with walking and has not fallen for over 1 month. Sha uses AFO as needed and feels comfortable continuing with independent home program to self manage symptms.       Current pain level is 0/10 .     Previous pain level was  0/10 .   Changes in function:  Yes (See Goal flowsheet attached for changes in current functional level)  Adverse reaction to treatment or activity: None    OBJECTIVE  Changes noted in objective findings:  {Ankle/Foot Evaluation  ROM:     AROM:     Dorsiflexion: Left:   3  Right:  WNL   Plantarflexion: Left:  15    Right:  WNL  Inversion: Left:  15     Right:  25  Eversion: 5     Right:8         Strength:    Dorsiflexion:  Left: 3+/5-4-/5     Pain:   5/5   Plantarflexion: Left: 3-/5   Pain:  3-/5    Inversion:Left: 5/5  Pain:     Right: 5/5  Pain:  Eversion:Left: 5/5  Pain:  Right: 5/5  Pain:        Posterior Tibialis: Left: 4-/-4/55  Pain:  Right: 5/5  Pain:  Peroneals: Left: 4-/5-4/5  Pain:  5/5          PALPATION: Palpation of ankle: unremarkable.    EDEMA: normal           MOBILITY TESTING:       Talocrural Left:  Improved mobililty noted        Subtalar Left:Improved  hypomobile noted      Midtarsal Left: Improved hypomobile noted        Standing: Increased weight bearing on right lower leg, increased left anterior tibialis, calf  and toe extensor muscle atrophy Gait: Ambulates with steppage gait and not able to get off the shelf AFO in his shoe. Left  2-3 toes ( significant hammer toes), mmt: EHL: 3/5, edl 2-3( no contraction evident, 4-5 2/5)  , Anterior tibialis: 3+/5-4-/5      Single leg balance:  9 seconds (L), 1 second (L)  Assessment/Plan:        ASSESSMENT/PLAN  Updated problem list and  treatment plan: Diagnosis 1:  eft drop foot  Decreased strength - therapeutic exercise, therapeutic activities and home program  Impaired balance - neuro re-education, therapeutic activities and home program  Impaired gait - gait training and home program  Impaired muscle performance - neuro re-education and home program  Decreased function - therapeutic activities and home program  STG/LTGs have been met or progress has been made towards goals:  Yes (See Goal flow sheet completed today.)  Assessment of Progress: The patient's condition is improving.  Self Management Plans:  Patient has been instructed in a home treatment program.  Patient is independent in a home treatment program.  Patient  has been instructed in self management of symptoms.  Patient is independent in self management of symptoms.  I have re-evaluated this patient and find that the nature, scope, duration and intensity of the therapy is appropriate for the medical condition of the patient.  Noe continues to require the following intervention to meet STG and LTG's:  PT intervention is no longer required to meet STG/LTG.    Recommendations:  This patient is ready to be discharged from therapy and continue their home treatment program.    Please refer to the daily flowsheet for treatment today, total treatment time and time spent performing 1:1 timed codes.

## 2017-02-07 DIAGNOSIS — D47.2 MONOCLONAL GAMMOPATHY PRESENT ON SERUM PROTEIN ELECTROPHORESIS: Primary | ICD-10-CM

## 2017-02-10 ENCOUNTER — ANTICOAGULATION THERAPY VISIT (OUTPATIENT)
Dept: ANTICOAGULATION | Facility: CLINIC | Age: 82
End: 2017-02-10

## 2017-02-10 DIAGNOSIS — D47.2 MONOCLONAL GAMMOPATHY PRESENT ON SERUM PROTEIN ELECTROPHORESIS: ICD-10-CM

## 2017-02-10 DIAGNOSIS — I48.91 ATRIAL FIBRILLATION, UNSPECIFIED TYPE (H): Primary | ICD-10-CM

## 2017-02-10 DIAGNOSIS — Z79.01 LONG-TERM (CURRENT) USE OF ANTICOAGULANTS: ICD-10-CM

## 2017-02-10 DIAGNOSIS — I48.91 ATRIAL FIBRILLATION, UNSPECIFIED TYPE (H): ICD-10-CM

## 2017-02-10 LAB — INR PPP: 1.8 (ref 0.86–1.14)

## 2017-02-10 NOTE — PROGRESS NOTES
ANTICOAGULATION FOLLOW-UP CLINIC VISIT    Patient Name:  Noe Florence  Date:  2/10/2017  Contact Type:  Telephone    SUBJECTIVE:        OBJECTIVE    INR   Date Value Ref Range Status   02/10/2017 1.80* 0.86 - 1.14 Final       ASSESSMENT / PLAN  INR assessment THER    Recheck INR In: 2 WEEKS    INR Location Clinic      Anticoagulation Summary as of 2/10/2017     INR goal 2.0-3.0   Selected INR 1.80! (2/10/2017)   Maintenance plan 5 mg (5 mg x 1) every day   Full instructions 5 mg every day   Weekly total 35 mg   Plan last modified Danya Khan, RN (2/10/2017)   Next INR check 2/24/2017   Priority INR   Target end date Indefinite    Indications   Atrial fibrillation (H) [I48.91] [I48.91]  Long-term (current) use of anticoagulants [Z79.01] [Z79.01]         Anticoagulation Episode Summary     INR check location     Preferred lab     Send INR reminders to [facility]    Comments Patient contact number: [phone]   Can leave results with Rica (friend)      Anticoagulation Care Providers     Provider Role Specialty Phone number    Deedee Baird MD Responsible Cardiology [phone]            See the Encounter Report to view Anticoagulation Flowsheet and Dosing Calendar (Go to Encounters tab in chart review, and find the Anticoagulation Therapy Visit)    Left message with results and dosing recommendations. Asked patient to call back to report any missed doses, falls, signs and symptoms of bleeding or clotting, or any changes to health or diet.     Danya Khan, RN

## 2017-02-13 LAB
IGA SERPL-MCNC: 575 MG/DL (ref 70–380)
IGG SERPL-MCNC: 1200 MG/DL (ref 695–1620)
IGM SERPL-MCNC: 113 MG/DL (ref 60–265)
IMMUNOFIX ELP, URINE: NORMAL
IMMUNOFIXATION ELP: ABNORMAL

## 2017-02-24 DIAGNOSIS — I48.91 ATRIAL FIBRILLATION, UNSPECIFIED TYPE (H): ICD-10-CM

## 2017-02-24 DIAGNOSIS — Z79.01 LONG-TERM (CURRENT) USE OF ANTICOAGULANTS: ICD-10-CM

## 2017-02-24 LAB — INR PPP: 2.52 (ref 0.86–1.14)

## 2017-02-27 ENCOUNTER — ANTICOAGULATION THERAPY VISIT (OUTPATIENT)
Dept: ANTICOAGULATION | Facility: CLINIC | Age: 82
End: 2017-02-27

## 2017-02-27 DIAGNOSIS — I48.91 ATRIAL FIBRILLATION, UNSPECIFIED TYPE (H): ICD-10-CM

## 2017-02-27 DIAGNOSIS — Z79.01 LONG-TERM (CURRENT) USE OF ANTICOAGULANTS: ICD-10-CM

## 2017-02-27 NOTE — PROGRESS NOTES
ANTICOAGULATION FOLLOW-UP CLINIC VISIT    Patient Name:  Noe Florence  Date:  2/27/2017  Contact Type:  Telephone    SUBJECTIVE:     Patient Findings     Positives No Problem Findings           OBJECTIVE    INR   Date Value Ref Range Status   02/24/2017 2.52 (H) 0.86 - 1.14 Final       ASSESSMENT / PLAN  INR assessment THER    Recheck INR In: 2 WEEKS    INR Location Clinic      Anticoagulation Summary as of 2/27/2017     INR goal 2.0-3.0   Today's INR 2.52 (2/24/2017)   Maintenance plan 5 mg (5 mg x 1) every day   Full instructions 5 mg every day   Weekly total 35 mg   Plan last modified Danya Khan RN (2/10/2017)   Next INR check 3/10/2017   Priority INR   Target end date Indefinite    Indications   Atrial fibrillation (H) [I48.91] [I48.91]  Long-term (current) use of anticoagulants [Z79.01] [Z79.01]         Anticoagulation Episode Summary     INR check location     Preferred lab     Send INR reminders to [facility]    Comments Patient contact number: [phone]   Can leave results with Rica (friend)      Anticoagulation Care Providers     Provider Role Specialty Phone number    Deedee Baird MD Responsible Cardiology [phone]            See the Encounter Report to view Anticoagulation Flowsheet and Dosing Calendar (Go to Encounters tab in chart review, and find the Anticoagulation Therapy Visit)    Spoke with friend Rica Zuniga RN

## 2017-02-27 NOTE — MR AVS SNAPSHOT
Noe Florence   2/27/2017   Anticoagulation Therapy Visit    Description:  81 year old male   Provider:  Nguyen Zuniga, RN   Department:  [facility]           INR as of 2/27/2017     Today's INR 2.52 (2/24/2017)      Anticoagulation Summary as of 2/27/2017     INR goal 2.0-3.0   Today's INR 2.52 (2/24/2017)   Full instructions 5 mg every day   Next INR check 3/10/2017    Indications   Atrial fibrillation (H) [I48.91] [I48.91]  Long-term (current) use of anticoagulants [Z79.01] [Z79.01]         February 2017 Details    Sun Mon Tue Wed Thu Fri Sat        1               2               3               4                 5               6               7               8               9               10               11                 12               13               14               15               16               17               18                 19               20               21               22               23               24               25                 26               27      5 mg   See details      28      5 mg              Date Details   02/27 This INR check               How to take your warfarin dose     To take:  5 mg Take 1 of the 5 mg tablets.           March 2017 Details    Sun Mon Tue Wed Thu Fri Sat        1      5 mg         2      5 mg         3      5 mg         4      5 mg           5      5 mg         6      5 mg         7      5 mg         8      5 mg         9      5 mg         10            11                 12               13               14               15               16               17               18                 19               20               21               22               23               24               25                 26               27               28               29               30               31                 Date Details   No additional details    Date of next INR:  3/10/2017         How to take your warfarin dose     To  take:  5 mg Take 1 of the 5 mg tablets.

## 2017-03-10 ENCOUNTER — ANTICOAGULATION THERAPY VISIT (OUTPATIENT)
Dept: ANTICOAGULATION | Facility: CLINIC | Age: 82
End: 2017-03-10

## 2017-03-10 DIAGNOSIS — I48.91 ATRIAL FIBRILLATION, UNSPECIFIED TYPE (H): ICD-10-CM

## 2017-03-10 DIAGNOSIS — Z79.01 LONG-TERM (CURRENT) USE OF ANTICOAGULANTS: ICD-10-CM

## 2017-03-10 LAB — INR PPP: 2 (ref 0.86–1.14)

## 2017-03-10 NOTE — PROGRESS NOTES
ANTICOAGULATION FOLLOW-UP CLINIC VISIT    Patient Name:  Noe Florence  Date:  3/10/2017  Contact Type:  Telephone    SUBJECTIVE:        OBJECTIVE    INR   Date Value Ref Range Status   03/10/2017 2.00 (H) 0.86 - 1.14 Final       ASSESSMENT / PLAN  INR assessment THER    Recheck INR In: 3 WEEKS    INR Location Clinic      Anticoagulation Summary as of 3/10/2017     INR goal 2.0-3.0   Today's INR 2.00   Maintenance plan 5 mg (5 mg x 1) every day   Full instructions 5 mg every day   Weekly total 35 mg   No change documented Danya Khan RN   Plan last modified Danya Khan RN (2/10/2017)   Next INR check 3/31/2017   Priority INR   Target end date Indefinite    Indications   Atrial fibrillation (H) [I48.91] [I48.91]  Long-term (current) use of anticoagulants [Z79.01] [Z79.01]         Anticoagulation Episode Summary     INR check location     Preferred lab     Send INR reminders to [facility]    Comments Patient contact number: [phone]   Can leave results with Rica (friend)      Anticoagulation Care Providers     Provider Role Specialty Phone number    Deedee Baird MD Responsible Cardiology [phone]            See the Encounter Report to view Anticoagulation Flowsheet and Dosing Calendar (Go to Encounters tab in chart review, and find the Anticoagulation Therapy Visit)    Left message with results and dosing recommendations. Asked patient to call back to report any missed doses, falls, signs and symptoms of bleeding or clotting, or any changes to health or diet.     Danya Khan RN

## 2017-03-10 NOTE — MR AVS SNAPSHOT
Noe Florence   3/10/2017   Anticoagulation Therapy Visit    Description:  81 year old male   Provider:  Danya Khan, RN   Department:  [facility]           INR as of 3/10/2017     Today's INR 2.00      Anticoagulation Summary as of 3/10/2017     INR goal 2.0-3.0   Today's INR 2.00   Full instructions 5 mg every day   Next INR check 3/31/2017    Indications   Atrial fibrillation (H) [I48.91] [I48.91]  Long-term (current) use of anticoagulants [Z79.01] [Z79.01]         March 2017 Details    Sun Mon Tue Wed Thu Fri Sat        1               2               3               4                 5               6               7               8               9               10      5 mg   See details      11      5 mg           12      5 mg         13      5 mg         14      5 mg         15      5 mg         16      5 mg         17      5 mg         18      5 mg           19      5 mg         20      5 mg         21      5 mg         22      5 mg         23      5 mg         24      5 mg         25      5 mg           26      5 mg         27      5 mg         28      5 mg         29      5 mg         30      5 mg         31              Date Details   03/10 This INR check       Date of next INR:  3/31/2017         How to take your warfarin dose     To take:  5 mg Take 1 of the 5 mg tablets.

## 2017-03-22 ENCOUNTER — ALLIED HEALTH/NURSE VISIT (OUTPATIENT)
Dept: CARDIOLOGY | Facility: CLINIC | Age: 82
End: 2017-03-22
Attending: INTERNAL MEDICINE
Payer: COMMERCIAL

## 2017-03-22 DIAGNOSIS — I25.5 ISCHEMIC CARDIOMYOPATHY: Primary | ICD-10-CM

## 2017-03-22 PROCEDURE — 93296 REM INTERROG EVL PM/IDS: CPT | Mod: ZF

## 2017-03-22 PROCEDURE — 93295 DEV INTERROG REMOTE 1/2/MLT: CPT | Performed by: INTERNAL MEDICINE

## 2017-03-22 NOTE — MR AVS SNAPSHOT
After Visit Summary   3/22/2017    Noe Florence    MRN: [id number]           Patient Information     Date Of Birth          1935        Visit Information        Provider Department      3/22/2017 6:00 AM  ICD REMOTE [facility]        Today's Diagnoses     Ischemic cardiomyopathy    -  1       Follow-ups after your visit        Your next 10 appointments already scheduled     Mar 31, 2017  9:30 AM CDT   LAB with  LAB   [facility] ([facility])    [address] [place] [address] [phone]           Patient must bring picture ID.  Patient should be prepared to give a urine specimen  Please do not eat 10-12 hours before your appointment if you are coming in fasting for labs on lipids, cholesterol, or glucose (sugar).  Pregnant women should follow their Care Team instructions. Water with medications is okay. Do not drink coffee or other fluids.   If you have concerns about taking  your medications, please ask at office or if scheduling via [employer], send a message by clicking on Secure Messaging, Message Your Care Team.            Mar 31, 2017 10:00 AM CDT   (Arrive by 9:45 AM)   CT CHEST/ABDOMEN/PELVIS W CONTRAST with UCCT2   [facility] ([facility])    [address] [place] [address] [phone]           Please bring any scans or X-rays taken at other hospitals, if similar tests were done. Also bring a list of your medicines, including vitamins, minerals and over-the-counter drugs. It is safest to leave personal items at home.  Be sure to tell your doctor:   If you have any allergies.   If there s any chance you are pregnant.   If you are breastfeeding.   If you have any special needs.  You may have contrast for this exam. To prepare:   Do not eat or drink for 2 hours before your exam. If you need to take medicine, you may take it with small sips of water. (We  may ask you to take liquid medicine as well.)   The day before your exam, drink extra fluids at least six 8-ounce glasses (unless your doctor tells you to restrict your fluids).  Patients over 70 or patients with diabetes or kidney problems:   If you haven t had a blood test (creatinine test) within the last 30 days, go to your clinic or Diagnostic Imaging Department for this test.  If you have diabetes:   If your kidney function is normal, continue taking your metformin (Avandamet, Glucophage, Glucovance, Metaglip) on the day of your exam.   If your kidney function is abnormal, wait 48 hours before restarting this medicine.  You will have oral contrast for this exam:   You will drink the contrast at home. Get this from your clinic or Diagnostic Imaging Department. Please follow the directions given.  Please wear loose clothing, such as a sweat suit or jogging clothes. Avoid snaps, zippers and other metal. We may ask you to undress and put on a hospital gown.  If you have any questions, please call the Imaging Department where you will have your exam.            Apr 03, 2017  2:30 PM CDT   (Arrive by 2:15 PM)   Return Visit with Francisco Gilliam MD   [facility] ([facility])    [address]  2nd Floor  [facility] [address] [phone]            Jun 19, 2017  1:30 PM CDT   (Arrive by 1:15 PM)   Implanted Defibulator with Uc Cv Device 1   [facility] ([facility])    [address]  3rd Floor  [facility] [address] [phone]            Oct 05, 2017  1:00 PM CDT   RETURN GENERAL with Omero Srinivasan MD   [facility] ([facility])    [address]  [address]  [address]  [facility] [address] [phone]              Who to contact     If you have questions or need follow up information about today's clinic visit or your schedule please contact [facility]  directly at [phone].  Normal or non-critical lab and imaging results will be communicated to you by MyChart, letter or phone within 4 business days after the clinic has received the results. If you do not hear from us within 7 days, please contact the clinic through [email or web address] or phone. If you have a critical or abnormal lab result, we will notify you by phone as soon as possible.  Submit refill requests through [employer] or call your pharmacy and they will forward the refill request to us. Please allow 3 business days for your refill to be completed.          Additional Information About Your Visit        [email or web address] Information     [employer] gives you secure access to your electronic health record. If you see a primary care provider, you can also send messages to your care team and make appointments. If you have questions, please call your primary care clinic.  If you do not have a primary care provider, please call [phone] and they will assist you.        Care EveryWhere ID     This is your Care EveryWhere ID. This could be used by other organizations to access your [place] medical records  [id number]         Blood Pressure from Last 3 Encounters:   01/25/17 99/59   12/19/16 119/67   11/28/16 126/82    Weight from Last 3 Encounters:   01/25/17 68.9 kg (151 lb 12.8 oz)   12/19/16 69.6 kg (153 lb 6.4 oz)   11/28/16 69.5 kg (153 lb 4.8 oz)              We Performed the Following     INTERROGATION DEVICE EVAL REMOTE, PACER/ICD          Today's Medication Changes          These changes are accurate as of: 3/22/17  2:10 PM.  If you have any questions, ask your nurse or doctor.               These medicines have changed or have updated prescriptions.        Dose/Directions    fluticasone 50 MCG/ACT spray   Commonly known as:  FLONASE   This may have changed:    - when to take this  - reasons to take this   Used for:  Unspecified sinusitis (chronic)        Dose:  2 spray   Spray 2 sprays into both nostrils daily    Quantity:  3 Package   Refills:  0                Primary Care Provider Office Phone # Fax #    Roberto Fabiano Sarmiento [phone]        [facility] [address]  [facility] [address]        Thank you!     Thank you for choosing [facility]  for your care. Our goal is always to provide you with excellent care. Hearing back from our patients is one way we can continue to improve our services. Please take a few minutes to complete the written survey that you may receive in the mail after your visit with us. Thank you!             Your Updated Medication List - Protect others around you: Learn how to safely use, store and throw away your medicines at www.disposemymeds.org.          This list is accurate as of: 3/22/17  2:10 PM.  Always use your most recent med list.                   Brand Name Dispense Instructions for use    aspirin 81 MG tablet      Take 1 tablet by mouth daily.       atorvastatin 40 MG tablet    LIPITOR    90 tablet    Take 1 tablet (40 mg) by mouth daily       Blood Pressure Monitor Kit     1 kit    1 Units daily       CENTRUM SILVER per tablet      Take 1 tablet by mouth daily. AM       ciclopirox 0.77 % cream    LOPROX    90 g    Apply topically 2 times daily To feet and toenails.       cyanocobalamin 1000 MCG tablet    vitamin  B-12    180 tablet    Take 2 tablets (2,000 mcg) by mouth daily       doxazosin 2 MG tablet    CARDURA    90 tablet    Take 1/2 tab in the morning and 1/2 tab in the evening       fluticasone 50 MCG/ACT spray    FLONASE    3 Package    Spray 2 sprays into both nostrils daily       ketoconazole 2 % cream    NIZORAL    60 g    Apply topically daily On hold       loratadine 10 MG tablet    CLARITIN     Take 10 mg by mouth as needed       metoprolol 25 MG tablet    LOPRESSOR    180 tablet    Take 1 tablet (25 mg) by mouth 2 times daily       mirtazapine 15 MG tablet    REMERON     Take 15 mg by mouth At Bedtime.        sulfamethoxazole-trimethoprim 400-80 MG per tablet    BACTRIM/SEPTRA    14 tablet    Take 1 tablet by mouth 2 times daily       triamcinolone 0.1 % cream    KENALOG    80 g    Apply topically 2 times daily For up to two weeks in a row       warfarin 5 MG tablet    COUMADIN    35 tablet    7.5 mg on Wed; 5 mg all other days  or as instructed by coumadin clinic.       ZOLOFT 100 MG tablet   Generic drug:  sertraline      Take 100 mg by mouth every evening.

## 2017-03-22 NOTE — PROGRESS NOTES
Remote ICD transmission received and reviewed.  Device transmission sent per MD orders.  Patient has a Medtronic dual lead ICD.  Normal ICD function.  49 AT/AF episodes recorded - < 1 min in duration.  Presenting EGM = AP-VS @ 74 bpm.  AP = 79.3%.   = 6.7%.  OptiVol fluid index is near baseline.  Battery voltage = 2.92 V.  Patient notified of interrogation results.  Patient reports that he is feeling well and denies complaints.  Plan for patient to return to clinic in 3 months as scheduled.    Remote ICD transmission

## 2017-03-29 DIAGNOSIS — I48.92 ATRIAL FLUTTER (H): ICD-10-CM

## 2017-03-29 RX ORDER — WARFARIN SODIUM 5 MG/1
TABLET ORAL
Qty: 35 TABLET | Refills: 5 | Status: ON HOLD | OUTPATIENT
Start: 2017-03-29 | End: 2018-12-11

## 2017-03-31 ENCOUNTER — ANTICOAGULATION THERAPY VISIT (OUTPATIENT)
Dept: ANTICOAGULATION | Facility: CLINIC | Age: 82
End: 2017-03-31

## 2017-03-31 DIAGNOSIS — I48.91 ATRIAL FIBRILLATION, UNSPECIFIED TYPE (H): ICD-10-CM

## 2017-03-31 DIAGNOSIS — Z79.01 LONG-TERM (CURRENT) USE OF ANTICOAGULANTS: ICD-10-CM

## 2017-03-31 LAB — INR PPP: 1.88 (ref 0.86–1.14)

## 2017-03-31 NOTE — PROGRESS NOTES
ANTICOAGULATION FOLLOW-UP CLINIC VISIT    Patient Name:  Noe Florence  Date:  3/31/2017  Contact Type:  Telephone    SUBJECTIVE:        OBJECTIVE    INR   Date Value Ref Range Status   03/31/2017 1.88 (H) 0.86 - 1.14 Final       ASSESSMENT / PLAN  INR assessment SUB    Recheck INR In: 1 WEEK    INR Location Clinic      Anticoagulation Summary as of 3/31/2017     INR goal 2.0-3.0   Today's INR 1.88!   Maintenance plan 5 mg (5 mg x 1) every day   Full instructions 3/31: 7.5 mg; Otherwise 5 mg every day   Weekly total 35 mg   Plan last modified Danya Khan RN (2/10/2017)   Next INR check 4/7/2017   Priority INR   Target end date Indefinite    Indications   Atrial fibrillation (H) [I48.91] [I48.91]  Long-term (current) use of anticoagulants [Z79.01] [Z79.01]         Anticoagulation Episode Summary     INR check location     Preferred lab     Send INR reminders to [facility]    Comments Patient contact number: [phone]   Can leave results with Rica (friend)      Anticoagulation Care Providers     Provider Role Specialty Phone number    Deedee Baird MD Responsible Cardiology [phone]            See the Encounter Report to view Anticoagulation Flowsheet and Dosing Calendar (Go to Encounters tab in chart review, and find the Anticoagulation Therapy Visit)    Left message for patient with results and dosing recommendations. Asked patient to call back to report any missed doses, falls, signs and symptoms of bleeding or clotting, any changes in health, medication, or diet. Asked patient to call back with any questions or concerns.     1:00 PM:  Noe called back.  He missed a coumadin dose 3/28/17.  He will increase warfarin dose today, then go back to 5 mg daily.  He will recheck INR in 2 weeks.    Danya Edwards RN

## 2017-04-03 ENCOUNTER — ONCOLOGY VISIT (OUTPATIENT)
Dept: ONCOLOGY | Facility: CLINIC | Age: 82
End: 2017-04-03
Attending: INTERNAL MEDICINE
Payer: COMMERCIAL

## 2017-04-03 VITALS
SYSTOLIC BLOOD PRESSURE: 105 MMHG | WEIGHT: 155.87 LBS | OXYGEN SATURATION: 99 % | RESPIRATION RATE: 18 BRPM | HEART RATE: 70 BPM | BODY MASS INDEX: 23.7 KG/M2 | TEMPERATURE: 97.1 F | DIASTOLIC BLOOD PRESSURE: 56 MMHG

## 2017-04-03 DIAGNOSIS — C18.9 MALIGNANT NEOPLASM OF COLON, UNSPECIFIED PART OF COLON (H): Primary | ICD-10-CM

## 2017-04-03 PROCEDURE — 99213 OFFICE O/P EST LOW 20 MIN: CPT | Mod: ZP | Performed by: INTERNAL MEDICINE

## 2017-04-03 PROCEDURE — 99212 OFFICE O/P EST SF 10 MIN: CPT

## 2017-04-03 ASSESSMENT — PAIN SCALES - GENERAL: PAINLEVEL: NO PAIN (0)

## 2017-04-03 NOTE — PROGRESS NOTES
HISTORY OF PRESENT ILLNESS:  Noe Florence is here in followup of stage II colon cancer.  He had his tumor out in 04/2014, just about 3 years ago.  A few months after surgery he had a new 5-mm liver lesion and mild elevation of his CEA to 4.8, but that resolved within a few months on his imaging with only mild elevations of his CEA ever since.  He returns today for ongoing surveillance for recurrence.  He says overall he has been feeling well.  He had been having some unintentional weight loss over the past year.  He thinks that has stabilized now.  His wife thinks it is a mixture of him not wanting to eat much and not getting much exercise so he has lost some muscle mass.  He says he still feels hungry, but he just gets busy with other things and always has a somewhat erratic schedule.  He does feel a little bit weak.  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  He has not really had any other focal neurologic symptoms and, if anything, his stumbling has been a little bit less of a problem as of late.  He has no significant pain.  The remainder of a 10-point complete review of systems is otherwise unremarkable.      PHYSICAL EXAMINATION:   GENERAL:  Mr. Florence is well but appears his stated age.   HEENT:  He has no icterus.   NECK:  He has no adenopathy in the neck or supraclavicular spaces.   LUNGS:  His lungs are clear to auscultation without dullness to percussion.   HEART:  His heart rate and rhythm are regular without audible murmur or gallop.   ABDOMEN:  The abdomen is soft and nontender without palpable mass or organomegaly.   EXTREMITIES:  He has no peripheral edema.  There is no tenderness in the calves or thighs.   NEUROLOGIC:  Speech is fluent and cranial nerves are grossly intact.      RESULTS:  I reviewed with the patient and his wife his CT scan which shows no evidence of recurrence of his disease.  His CEA  level remains minimally elevated consistent with prior values today at 3.8.  The rest of his lab work is unremarkable.       ASSESSMENT:  History of stage II colon cancer.  The patient remains free of evidence of disease other than a very mild elevation of his CEA level.  We will see him back in 6 months with repeat labs and CT scan.  I discussed with him symptoms to bring to our attention, particularly if his weight loss picks up and particularly if it accelerates.

## 2017-04-03 NOTE — MR AVS SNAPSHOT
After Visit Summary   4/3/2017    Noe Florence    MRN: [id number]           Patient Information     Date Of Birth          1935        Visit Information        Provider Department      4/3/2017 2:30 PM Francisco Gilliam MD [facility]        Today's Diagnoses     Malignant neoplasm of colon, unspecified part of colon (H)    -  1       Follow-ups after your visit        Follow-up notes from your care team     Return in about 6 months (around 10/3/2017) for NP Visit with CT and labs.      Your next 10 appointments already scheduled     Jun 19, 2017  1:30 PM CDT   (Arrive by 1:15 PM)   Implanted Defibulator with  Cv Device 1   [facility]    [address] [place] [address] [phone]            Sep 29, 2017  1:00 PM CDT   LAB with UC LAB   [facility]    [address] [place] [address] [phone]           Patient must bring picture ID.  Patient should be prepared to give a urine specimen  Please do not eat 10-12 hours before your appointment if you are coming in fasting for labs on lipids, cholesterol, or glucose (sugar).  Pregnant women should follow their Care Team instructions. Water with medications is okay. Do not drink coffee or other fluids.   If you have concerns about taking  your medications, please ask at office or if scheduling via [employer], send a message by clicking on Secure Messaging, Message Your Care Team.            Sep 29, 2017  1:20 PM CDT   (Arrive by 1:05 PM)   CT CHEST ABDOMEN PELVIS W/O & W CONTRAST with UCCT2   [facility]    [address] [place] [id number] [phone]           Please bring any scans or X-rays taken at other hospitals, if similar tests were done. Also bring a list of your medicines, including vitamins,  minerals and over-the-counter drugs. It is safest to leave personal items at home.  Be sure to tell your doctor:   If you have any allergies.   If there s any chance you are pregnant.   If you are breastfeeding.   If you have any special needs.  You may have contrast for this exam. To prepare:   Do not eat or drink for 2 hours before your exam. If you need to take medicine, you may take it with small sips of water. (We may ask you to take liquid medicine as well.)   The day before your exam, drink extra fluids at least six 8-ounce glasses (unless your doctor tells you to restrict your fluids).  Patients over 70 or patients with diabetes or kidney problems:   If you haven t had a blood test (creatinine test) within the last 30 days, go to your clinic or Diagnostic Imaging Department for this test.  If you have diabetes:   If your kidney function is normal, continue taking your metformin (Avandamet, Glucophage, Glucovance, Metaglip) on the day of your exam.   If your kidney function is abnormal, wait 48 hours before restarting this medicine.  You will have oral contrast for this exam:   You will drink the contrast at home. Get this from your clinic or Diagnostic Imaging Department. Please follow the directions given.  Please wear loose clothing, such as a sweat suit or jogging clothes. Avoid snaps, zippers and other metal. We may ask you to undress and put on a hospital gown.  If you have any questions, please call the Imaging Department where you will have your exam.            Oct 03, 2017 11:20 AM CDT   (Arrive by 11:05 AM)   Return Visit with MARIA LUISA Lackey [facility] ([facility])    [address]  2nd Floor  [facility] [address] [phone]            Oct 05, 2017  1:00 PM CDT   RETURN GENERAL with Omero Srinivasan MD   [facility] ([facility])    [address] [facility]  [address] [facility]  [address] [address]    [phone]              Future tests that were ordered for you today     Open Future Orders        Priority Expected Expires Ordered    CT Chest Abdomen Pelvis w/o & w Contrast Routine 10/3/2017 11/3/2017 4/3/2017    Comprehensive metabolic panel Routine 10/3/2017 11/3/2017 4/3/2017    CBC with platelets differential Routine 10/3/2017 11/3/2017 4/3/2017    CEA Routine 10/3/2017 11/3/2017 4/3/2017            Who to contact     If you have questions or need follow up information about today's clinic visit or your schedule please contact [facility] directly at [phone].  Normal or non-critical lab and imaging results will be communicated to you by [employer], letter or phone within 4 business days after the clinic has received the results. If you do not hear from us within 7 days, please contact the clinic through [employer] or phone. If you have a critical or abnormal lab result, we will notify you by phone as soon as possible.  Submit refill requests through [employer] or call your pharmacy and they will forward the refill request to us. Please allow 3 business days for your refill to be completed.          Additional Information About Your Visit        [employer] Information     [employer] gives you secure access to your electronic health record. If you see a primary care provider, you can also send messages to your care team and make appointments. If you have questions, please call your primary care clinic.  If you do not have a primary care provider, please call [phone] and they will assist you.        Care EveryWhere ID     This is your Care EveryWhere ID. This could be used by other organizations to access your [place] medical records  [id number]        Your Vitals Were     Pulse Temperature Respirations Pulse Oximetry BMI (Body Mass Index)       70 97.1  F (36.2  C) (Oral) 18 99% 23.7 kg/m2        Blood Pressure from Last 3 Encounters:   04/03/17 105/56   01/25/17 99/59   12/19/16 119/67     Weight from Last 3 Encounters:   04/03/17 70.7 kg (155 lb 13.8 oz)   01/25/17 68.9 kg (151 lb 12.8 oz)   12/19/16 69.6 kg (153 lb 6.4 oz)                 Today's Medication Changes          These changes are accurate as of: 4/3/17  3:29 PM.  If you have any questions, ask your nurse or doctor.               These medicines have changed or have updated prescriptions.        Dose/Directions    fluticasone 50 MCG/ACT spray   Commonly known as:  FLONASE   This may have changed:    - when to take this  - reasons to take this   Used for:  Unspecified sinusitis (chronic)        Dose:  2 spray   Spray 2 sprays into both nostrils daily   Quantity:  3 Package   Refills:  0                Primary Care Provider Office Phone # Fax #    Roberto Sarmiento [phone]        PHYSIC[facility] [address]  [facility] [address]        Thank you!     Thank you for choosing [facility]  for your care. Our goal is always to provide you with excellent care. Hearing back from our patients is one way we can continue to improve our services. Please take a few minutes to complete the written survey that you may receive in the mail after your visit with us. Thank you!             Your Updated Medication List - Protect others around you: Learn how to safely use, store and throw away your medicines at www.disposemymeds.org.          This list is accurate as of: 4/3/17  3:29 PM.  Always use your most recent med list.                   Brand Name Dispense Instructions for use    aspirin 81 MG tablet      Take 1 tablet by mouth daily.       atorvastatin 40 MG tablet    LIPITOR    90 tablet    Take 1 tablet (40 mg) by mouth daily       Blood Pressure Monitor Kit     1 kit    1 Units daily       CENTRUM SILVER per tablet      Take 1 tablet by mouth daily. AM       ciclopirox 0.77 % cream    LOPROX    90 g    Apply topically 2 times daily To feet and toenails.       cyanocobalamin 1000 MCG tablet     vitamin  B-12    180 tablet    Take 2 tablets (2,000 mcg) by mouth daily       doxazosin 2 MG tablet    CARDURA    90 tablet    Take 1/2 tab in the morning and 1/2 tab in the evening       fluticasone 50 MCG/ACT spray    FLONASE    3 Package    Spray 2 sprays into both nostrils daily       ketoconazole 2 % cream    NIZORAL    60 g    Apply topically daily On hold       loratadine 10 MG tablet    CLARITIN     Take 10 mg by mouth as needed       metoprolol 25 MG tablet    LOPRESSOR    180 tablet    Take 1 tablet (25 mg) by mouth 2 times daily       mirtazapine 15 MG tablet    REMERON     Take 15 mg by mouth At Bedtime.       sulfamethoxazole-trimethoprim 400-80 MG per tablet    BACTRIM/SEPTRA    14 tablet    Take 1 tablet by mouth 2 times daily       triamcinolone 0.1 % cream    KENALOG    80 g    Apply topically 2 times daily For up to two weeks in a row       warfarin 5 MG tablet    COUMADIN    35 tablet    7.5 mg on Wed; 5 mg all other days  or as instructed by coumadin clinic.       ZOLOFT 100 MG tablet   Generic drug:  sertraline      Take 100 mg by mouth every evening.

## 2017-04-03 NOTE — LETTER
4/3/2017      RE: Noe Florence  [address] [facility] [address]       HISTORY OF PRESENT ILLNESS:  Noe Florence is here in followup of stage II colon cancer.  He had his tumor out in 04/2014, just about 3 years ago.  A few months after surgery he had a new 5-mm liver lesion and mild elevation of his CEA to 4.8, but that resolved within a few months on his imaging with only mild elevations of his CEA ever since.  He returns today for ongoing surveillance for recurrence.  He says overall he has been feeling well.  He had been having some unintentional weight loss over the past year.  He thinks that has stabilized now.  His wife thinks it is a mixture of him not wanting to eat much and not getting much exercise so he has lost some muscle mass.  He says he still feels hungry, but he just gets busy with other things and always has a somewhat erratic schedule.  He does feel a little bit weak.  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  He has not really had any other focal neurologic symptoms and, if anything, his stumbling has been a little bit less of a problem as of late.  He has no significant pain.  The remainder of a 10-point complete review of systems is otherwise unremarkable.      PHYSICAL EXAMINATION:   GENERAL:  Mr. Florence is well but appears his stated age.   HEENT:  He has no icterus.   NECK:  He has no adenopathy in the neck or supraclavicular spaces.   LUNGS:  His lungs are clear to auscultation without dullness to percussion.   HEART:  His heart rate and rhythm are regular without audible murmur or gallop.   ABDOMEN:  The abdomen is soft and nontender without palpable mass or organomegaly.   EXTREMITIES:  He has no peripheral edema.  There is no tenderness in the calves or thighs.   NEUROLOGIC:  Speech is fluent and cranial nerves are grossly intact.      RESULTS:  I reviewed with the patient and  his wife his CT scan which shows no evidence of recurrence of his disease.  His CEA level remains minimally elevated consistent with prior values today at 3.8.  The rest of his lab work is unremarkable.       ASSESSMENT:  History of stage II colon cancer.  The patient remains free of evidence of disease other than a very mild elevation of his CEA level.  We will see him back in 6 months with repeat labs and CT scan.  I discussed with him symptoms to bring to our attention, particularly if his weight loss picks up and particularly if it accelerates.         Francisco Gilliam MD

## 2017-04-03 NOTE — NURSING NOTE
"Noe Florence is a 81 year old male who presents for:  Chief Complaint   Patient presents with     Oncology Clinic Visit      Return for CT, Lab results, Colon Ca         Initial Vitals:  /56 (BP Location: Left arm, Patient Position: Chair, Cuff Size: Adult Regular)  Pulse 70  Temp 97.1  F (36.2  C) (Oral)  Resp 18  Wt 70.7 kg (155 lb 13.8 oz)  SpO2 99%  BMI 23.7 kg/m2 Estimated body mass index is 23.7 kg/(m^2) as calculated from the following:    Height as of 1/25/17: 1.727 m (5' 8\").    Weight as of this encounter: 70.7 kg (155 lb 13.8 oz).. Body surface area is 1.84 meters squared. BP completed using cuff size: regular  No Pain (0) No LMP for male patient. Allergies and medications reviewed.     Medications: Medication refills not needed today.  Pharmacy name entered into EPIC:    [facility] [place], [place] - [address] [place] [address] [phone]  [facility] [place], [place] - [address]    Comments:     7  minutes for nursing intake (face to face time)   Geraldine Storey MA        "

## 2017-04-25 ENCOUNTER — ANTICOAGULATION THERAPY VISIT (OUTPATIENT)
Dept: ANTICOAGULATION | Facility: CLINIC | Age: 82
End: 2017-04-25

## 2017-04-25 DIAGNOSIS — Z79.01 LONG-TERM (CURRENT) USE OF ANTICOAGULANTS: ICD-10-CM

## 2017-04-25 DIAGNOSIS — I48.91 ATRIAL FIBRILLATION, UNSPECIFIED TYPE (H): ICD-10-CM

## 2017-04-25 LAB — INR PPP: 2.94 (ref 0.86–1.14)

## 2017-04-25 NOTE — PROGRESS NOTES
ANTICOAGULATION FOLLOW-UP CLINIC VISIT    Patient Name:  Noe Florence  Date:  4/25/2017  Contact Type:  Telephone    SUBJECTIVE:        OBJECTIVE    INR   Date Value Ref Range Status   04/25/2017 2.94 (H) 0.86 - 1.14 Final       ASSESSMENT / PLAN  INR assessment THER    Recheck INR In: 2 WEEKS    INR Location Clinic      Anticoagulation Summary as of 4/25/2017     INR goal 2.0-3.0   Today's INR 2.94   Maintenance plan 5 mg (5 mg x 1) every day   Full instructions 5 mg every day   Weekly total 35 mg   Plan last modified Danya Khan RN (2/10/2017)   Next INR check 5/9/2017   Priority INR   Target end date Indefinite    Indications   Atrial fibrillation (H) [I48.91] [I48.91]  Long-term (current) use of anticoagulants [Z79.01] [Z79.01]         Anticoagulation Episode Summary     INR check location     Preferred lab     Send INR reminders to [facility]    Comments Patient contact number: [phone]   Can leave results with Rica (friend)      Anticoagulation Care Providers     Provider Role Specialty Phone number    Deedee Baird MD Responsible Cardiology [phone]            See the Encounter Report to view Anticoagulation Flowsheet and Dosing Calendar (Go to Encounters tab in chart review, and find the Anticoagulation Therapy Visit)    LM for patient.    Leena Abebe RN

## 2017-04-25 NOTE — MR AVS SNAPSHOT
Noe Florence   4/25/2017   Anticoagulation Therapy Visit    Description:  81 year old male   Provider:  Leena Abebe RN   Department:  [facility]           INR as of 4/25/2017     Today's INR 2.94      Anticoagulation Summary as of 4/25/2017     INR goal 2.0-3.0   Today's INR 2.94   Full instructions 5 mg every day   Next INR check 5/9/2017    Indications   Atrial fibrillation (H) [I48.91] [I48.91]  Long-term (current) use of anticoagulants [Z79.01] [Z79.01]         April 2017 Details    Sun Mon Tue Wed Thu Fri Sat           1                 2               3               4               5               6               7               8                 9               10               11               12               13               14               15                 16               17               18               19               20               21               22                 23               24               25      5 mg   See details      26      5 mg         27      5 mg         28      5 mg         29      5 mg           30      5 mg                Date Details   04/25 This INR check               How to take your warfarin dose     To take:  5 mg Take 1 of the 5 mg tablets.           May 2017 Details    Sun Mon Tue Wed Thu Fri Sat      1      5 mg         2      5 mg         3      5 mg         4      5 mg         5      5 mg         6      5 mg           7      5 mg         8      5 mg         9            10               11               12               13                 14               15               16               17               18               19               20                 21               22               23               24               25               26               27                 28               29               30               31                   Date Details   No additional details    Date of next INR:  5/9/2017         How to take your  warfarin dose     To take:  5 mg Take 1 of the 5 mg tablets.

## 2017-05-22 DIAGNOSIS — Z79.01 LONG-TERM (CURRENT) USE OF ANTICOAGULANTS: ICD-10-CM

## 2017-05-22 DIAGNOSIS — I48.91 ATRIAL FIBRILLATION, UNSPECIFIED TYPE (H): ICD-10-CM

## 2017-05-22 LAB — INR PPP: 1.59 (ref 0.86–1.14)

## 2017-05-23 ENCOUNTER — ANTICOAGULATION THERAPY VISIT (OUTPATIENT)
Dept: ANTICOAGULATION | Facility: CLINIC | Age: 82
End: 2017-05-23

## 2017-05-23 DIAGNOSIS — I48.91 ATRIAL FIBRILLATION, UNSPECIFIED TYPE (H): ICD-10-CM

## 2017-05-23 DIAGNOSIS — Z79.01 LONG-TERM (CURRENT) USE OF ANTICOAGULANTS: ICD-10-CM

## 2017-05-23 NOTE — MR AVS SNAPSHOT
Noe Florence   5/23/2017   Anticoagulation Therapy Visit    Description:  82 year old male   Provider:  Danya Edwards RN   Department:  [facility]           INR as of 5/23/2017     Today's INR 1.59! (5/22/2017)      Anticoagulation Summary as of 5/23/2017     INR goal 2.0-3.0   Today's INR 1.59! (5/22/2017)   Full instructions 5/23: 7.5 mg; Otherwise 5 mg every day   Next INR check 5/31/2017    Indications   Atrial fibrillation (H) [I48.91] [I48.91]  Long-term (current) use of anticoagulants [Z79.01] [Z79.01]         May 2017 Details    Sun Mon Tue Wed Thu Fri Sat      1               2               3               4               5               6                 7               8               9               10               11               12               13                 14               15               16               17               18               19               20                 21               22               23      7.5 mg   See details      24      5 mg         25      5 mg         26      5 mg         27      5 mg           28      5 mg         29      5 mg         30      5 mg         31                Date Details   05/23 This INR check       Date of next INR:  5/31/2017         How to take your warfarin dose     To take:  5 mg Take 1 of the 5 mg tablets.    To take:  7.5 mg Take 1.5 of the 5 mg tablets.

## 2017-05-23 NOTE — PROGRESS NOTES
ANTICOAGULATION FOLLOW-UP CLINIC VISIT    Patient Name:  Noe Florence  Date:  5/23/2017  Contact Type:  Telephone    SUBJECTIVE:     Patient Findings     Positives Unexplained INR or factor level change    Comments Noe doesn't think he missed any doses, not completely sure.           OBJECTIVE    INR   Date Value Ref Range Status   05/22/2017 1.59 (H) 0.86 - 1.14 Final       ASSESSMENT / PLAN  INR assessment SUB    Recheck INR In: 1 WEEK    INR Location Clinic      Anticoagulation Summary as of 5/23/2017     INR goal 2.0-3.0   Today's INR 1.59! (5/22/2017)   Maintenance plan 5 mg (5 mg x 1) every day   Full instructions 5/23: 7.5 mg; Otherwise 5 mg every day   Weekly total 35 mg   Plan last modified Danya Khan RN (2/10/2017)   Next INR check 5/31/2017   Priority INR   Target end date Indefinite    Indications   Atrial fibrillation (H) [I48.91] [I48.91]  Long-term (current) use of anticoagulants [Z79.01] [Z79.01]         Anticoagulation Episode Summary     INR check location     Preferred lab     Send INR reminders to [facility]    Comments Patient contact number: [phone]   Can leave results with Rica (friend)      Anticoagulation Care Providers     Provider Role Specialty Phone number    Deedee Baird MD Responsible Cardiology [phone]            See the Encounter Report to view Anticoagulation Flowsheet and Dosing Calendar (Go to Encounters tab in chart review, and find the Anticoagulation Therapy Visit)    Spoke with Noe.  He doesn't think he missed any doses of coumadin, but is not completely sure.  He will increase his warfarin dose today, then go back to maintenance dose and recheck INR in about a week.    Danya Edwards RN

## 2017-06-02 ENCOUNTER — ANTICOAGULATION THERAPY VISIT (OUTPATIENT)
Dept: ANTICOAGULATION | Facility: CLINIC | Age: 82
End: 2017-06-02

## 2017-06-02 DIAGNOSIS — Z79.01 LONG-TERM (CURRENT) USE OF ANTICOAGULANTS: ICD-10-CM

## 2017-06-02 DIAGNOSIS — I48.91 ATRIAL FIBRILLATION, UNSPECIFIED TYPE (H): ICD-10-CM

## 2017-06-02 LAB — INR PPP: 2.5 (ref 0.86–1.14)

## 2017-06-02 NOTE — MR AVS SNAPSHOT
Noe Florence   6/2/2017   Anticoagulation Therapy Visit    Description:  82 year old male   Provider:  Nugyen Zuniga, RN   Department:  [facility]           INR as of 6/2/2017     Today's INR 2.50      Anticoagulation Summary as of 6/2/2017     INR goal 2.0-3.0   Today's INR 2.50   Full instructions 5 mg every day   Next INR check 6/16/2017    Indications   Atrial fibrillation (H) [I48.91] [I48.91]  Long-term (current) use of anticoagulants [Z79.01] [Z79.01]         June 2017 Details    Sun Mon Tue Wed Thu Fri Sat         1               2      5 mg   See details      3      5 mg           4      5 mg         5      5 mg         6      5 mg         7      5 mg         8      5 mg         9      5 mg         10      5 mg           11      5 mg         12      5 mg         13      5 mg         14      5 mg         15      5 mg         16            17                 18               19               20               21               22               23               24                 25               26               27               28               29               30                 Date Details   06/02 This INR check       Date of next INR:  6/16/2017         How to take your warfarin dose     To take:  5 mg Take 1 of the 5 mg tablets.

## 2017-06-02 NOTE — PROGRESS NOTES
ANTICOAGULATION FOLLOW-UP CLINIC VISIT    Patient Name:  Noe Florence  Date:  6/2/2017  Contact Type:  Telephone    SUBJECTIVE:     Patient Findings     Positives No Problem Findings           OBJECTIVE    INR   Date Value Ref Range Status   06/02/2017 2.50 (H) 0.86 - 1.14 Final       ASSESSMENT / PLAN  INR assessment THER    Recheck INR In: 2 WEEKS    INR Location Clinic      Anticoagulation Summary as of 6/2/2017     INR goal 2.0-3.0   Today's INR 2.50   Maintenance plan 5 mg (5 mg x 1) every day   Full instructions 5 mg every day   Weekly total 35 mg   Plan last modified Danya Khan, RN (2/10/2017)   Next INR check 6/16/2017   Priority INR   Target end date Indefinite    Indications   Atrial fibrillation (H) [I48.91] [I48.91]  Long-term (current) use of anticoagulants [Z79.01] [Z79.01]         Anticoagulation Episode Summary     INR check location     Preferred lab     Send INR reminders to [facility]    Comments Patient contact number: [phone]   Can leave results with Rica (nadiya)      Anticoagulation Care Providers     Provider Role Specialty Phone number    Deedee Baird MD Responsible Cardiology [phone]            See the Encounter Report to view Anticoagulation Flowsheet and Dosing Calendar (Go to Encounters tab in chart review, and find the Anticoagulation Therapy Visit)    Spoke with friend Rica. Gave them lab results and new warfarin recommendation.  No changes in health, medication, or diet. No missed doses, no falls. No signs or symptoms of bleed or clotting.     Nguyen Zuniga, RN

## 2017-06-19 ENCOUNTER — ANTICOAGULATION THERAPY VISIT (OUTPATIENT)
Dept: ANTICOAGULATION | Facility: CLINIC | Age: 82
End: 2017-06-19

## 2017-06-19 ENCOUNTER — ALLIED HEALTH/NURSE VISIT (OUTPATIENT)
Dept: CARDIOLOGY | Facility: CLINIC | Age: 82
End: 2017-06-19
Attending: INTERNAL MEDICINE
Payer: COMMERCIAL

## 2017-06-19 DIAGNOSIS — I48.91 ATRIAL FIBRILLATION, UNSPECIFIED TYPE (H): ICD-10-CM

## 2017-06-19 DIAGNOSIS — Z79.01 LONG-TERM (CURRENT) USE OF ANTICOAGULANTS: ICD-10-CM

## 2017-06-19 DIAGNOSIS — I25.5 ISCHEMIC CARDIOMYOPATHY: Primary | ICD-10-CM

## 2017-06-19 LAB — INR PPP: 2.07 (ref 0.86–1.14)

## 2017-06-19 PROCEDURE — 93289 INTERROG DEVICE EVAL HEART: CPT | Mod: 26 | Performed by: INTERNAL MEDICINE

## 2017-06-19 PROCEDURE — 93283 PRGRMG EVAL IMPLANTABLE DFB: CPT | Mod: ZF

## 2017-06-19 NOTE — MR AVS SNAPSHOT
After Visit Summary   6/19/2017    Noe Florence    MRN: [id number]           Patient Information     Date Of Birth          1935        Visit Information        Provider Department      6/19/2017 1:30 PM 1,  Cv Device [facility]        Care Instructions    It was a pleasure to see you in clinic today.  Please do not hesitate to call with any questions or concerns.  You are scheduled for a remote transmission on 9/19/17.  We look forward to seeing you in clinic at your next device check in 6 months.    Kathy Bright, RN, MS, CCRN  Electrophysiology Nurse Clinician  [facility]    During Business Hours Please Call:  [phone]  After Hours Please Call:  [phone] - select option #4 and ask for job code [phone]                        Follow-ups after your visit        Follow-up notes from your care team     Return in about 6 months (around 12/19/2017) for ICD Check, Dr. Baird.      Your next 10 appointments already scheduled     Sep 29, 2017  1:00 PM CDT   LAB with  LAB   [facility] ([facility])    [address] [place] [address] [phone]           Patient must bring picture ID.  Patient should be prepared to give a urine specimen  Please do not eat 10-12 hours before your appointment if you are coming in fasting for labs on lipids, cholesterol, or glucose (sugar).  Pregnant women should follow their Care Team instructions. Water with medications is okay. Do not drink coffee or other fluids.   If you have concerns about taking  your medications, please ask at office or if scheduling via [employer], send a message by clicking on Secure Messaging, Message Your Care Team.            Sep 29, 2017  1:20 PM CDT   (Arrive by 1:05 PM)   CT CHEST ABDOMEN PELVIS W/O & W CONTRAST with CT2   [facility] CT ([facility])    [address] [place] [address]  [id number] [phone]           Please bring any scans or X-rays taken at other hospitals, if similar tests were done. Also bring a list of your medicines, including vitamins, minerals and over-the-counter drugs. It is safest to leave personal items at home.  Be sure to tell your doctor:   If you have any allergies.   If there s any chance you are pregnant.   If you are breastfeeding.   If you have any special needs.  You may have contrast for this exam. To prepare:   Do not eat or drink for 2 hours before your exam. If you need to take medicine, you may take it with small sips of water. (We may ask you to take liquid medicine as well.)   The day before your exam, drink extra fluids at least six 8-ounce glasses (unless your doctor tells you to restrict your fluids).  Patients over 70 or patients with diabetes or kidney problems:   If you haven t had a blood test (creatinine test) within the last 30 days, go to your clinic or Diagnostic Imaging Department for this test.  If you have diabetes:   If your kidney function is normal, continue taking your metformin (Avandamet, Glucophage, Glucovance, Metaglip) on the day of your exam.   If your kidney function is abnormal, wait 48 hours before restarting this medicine.  You will have oral contrast for this exam:   You will drink the contrast at home. Get this from your clinic or Diagnostic Imaging Department. Please follow the directions given.  Please wear loose clothing, such as a sweat suit or jogging clothes. Avoid snaps, zippers and other metal. We may ask you to undress and put on a hospital gown.  If you have any questions, please call the Imaging Department where you will have your exam.            Oct 03, 2017 11:20 AM CDT   (Arrive by 11:05 AM)   Return Visit with MARIA LUISA Lackey [facility] ([facility])    [address] [facility] [address] [phone]            Oct 05, 2017   1:00 PM CDT   RETURN GENERAL with Omero Srinivasan MD   [facility] ([facility])    [address]  [address]  [address]  [facility] [address]   [phone]            Dec 04, 2017 11:00 AM CST   (Arrive by 10:45 AM)   Implanted Defibulator with Uc Cv Device 1   [facility] ([facility] [place])    [address]  3rd Floor  [facility] [address] [phone]            Dec 04, 2017 11:40 AM CST   (Arrive by 11:25 AM)   RETURN ARRHYTHMIA with Deedee Baird MD   [facility] ([facility] [place])    [address]  3rd Floor  [facility] [address] [phone]              Who to contact     If you have questions or need follow up information about today's clinic visit or your schedule please contact [facility] directly at [phone].  Normal or non-critical lab and imaging results will be communicated to you by [employer], letter or phone within 4 business days after the clinic has received the results. If you do not hear from us within 7 days, please contact the clinic through [employer] or phone. If you have a critical or abnormal lab result, we will notify you by phone as soon as possible.  Submit refill requests through [employer] or call your pharmacy and they will forward the refill request to us. Please allow 3 business days for your refill to be completed.          Additional Information About Your Visit        [employer] Information     [employer] gives you secure access to your electronic health record. If you see a primary care provider, you can also send messages to your care team and make appointments. If you have questions, please call your primary care clinic.  If you do not have a primary care provider, please call [phone] and they will assist you.        Care EveryWhere ID     This is your Care EveryWhere ID. This could be used by other organizations to access your [facility]  records  [id number]         Blood Pressure from Last 3 Encounters:   04/03/17 105/56   01/25/17 99/59   12/19/16 119/67    Weight from Last 3 Encounters:   04/03/17 70.7 kg (155 lb 13.8 oz)   01/25/17 68.9 kg (151 lb 12.8 oz)   12/19/16 69.6 kg (153 lb 6.4 oz)              Today, you had the following     No orders found for display         Today's Medication Changes          These changes are accurate as of: 6/19/17  4:30 PM.  If you have any questions, ask your nurse or doctor.               These medicines have changed or have updated prescriptions.        Dose/Directions    fluticasone 50 MCG/ACT spray   Commonly known as:  FLONASE   This may have changed:    - when to take this  - reasons to take this   Used for:  Unspecified sinusitis (chronic)        Dose:  2 spray   Spray 2 sprays into both nostrils daily   Quantity:  3 Package   Refills:  0                Primary Care Provider Office Phone # Fax #    Roberto Sarmiento [phone]        [facility] [address]  [facility] [address]        Thank you!     Thank you for choosing [facility]  for your care. Our goal is always to provide you with excellent care. Hearing back from our patients is one way we can continue to improve our services. Please take a few minutes to complete the written survey that you may receive in the mail after your visit with us. Thank you!             Your Updated Medication List - Protect others around you: Learn how to safely use, store and throw away your medicines at www.disposemymeds.org.          This list is accurate as of: 6/19/17  4:30 PM.  Always use your most recent med list.                   Brand Name Dispense Instructions for use    aspirin 81 MG tablet      Take 1 tablet by mouth daily.       atorvastatin 40 MG tablet    LIPITOR    90 tablet    Take 1 tablet (40 mg) by mouth daily       Blood Pressure Monitor Kit     1 kit    1 Units daily       CENTRUM SILVER per tablet       Take 1 tablet by mouth daily. AM       ciclopirox 0.77 % cream    LOPROX    90 g    Apply topically 2 times daily To feet and toenails.       cyanocobalamin 1000 MCG tablet    vitamin  B-12    180 tablet    Take 2 tablets (2,000 mcg) by mouth daily       doxazosin 2 MG tablet    CARDURA    90 tablet    Take 1/2 tab in the morning and 1/2 tab in the evening       fluticasone 50 MCG/ACT spray    FLONASE    3 Package    Spray 2 sprays into both nostrils daily       ketoconazole 2 % cream    NIZORAL    60 g    Apply topically daily On hold       loratadine 10 MG tablet    CLARITIN     Take 10 mg by mouth as needed       metoprolol 25 MG tablet    LOPRESSOR    180 tablet    Take 1 tablet (25 mg) by mouth 2 times daily       mirtazapine 15 MG tablet    REMERON     Take 15 mg by mouth At Bedtime.       sulfamethoxazole-trimethoprim 400-80 MG per tablet    BACTRIM/SEPTRA    14 tablet    Take 1 tablet by mouth 2 times daily       triamcinolone 0.1 % cream    KENALOG    80 g    Apply topically 2 times daily For up to two weeks in a row       warfarin 5 MG tablet    COUMADIN    35 tablet    7.5 mg on Wed; 5 mg all other days  or as instructed by coumadin clinic.       ZOLOFT 100 MG tablet   Generic drug:  sertraline      Take 100 mg by mouth every evening.

## 2017-06-19 NOTE — PROGRESS NOTES
ANTICOAGULATION FOLLOW-UP CLINIC VISIT    Patient Name:  Noe Florence  Date:  6/19/2017  Contact Type:  Telephone    SUBJECTIVE:     Patient Findings     Positives No Problem Findings           OBJECTIVE    INR   Date Value Ref Range Status   06/19/2017 2.07 (H) 0.86 - 1.14 Final       ASSESSMENT / PLAN  INR assessment THER    Recheck INR In: 3 WEEKS    INR Location Clinic      Anticoagulation Summary as of 6/19/2017     INR goal 2.0-3.0   Today's INR 2.07   Maintenance plan 5 mg (5 mg x 1) every day   Full instructions 5 mg every day   Weekly total 35 mg   No change documented Danya Khan, BYRON   Plan last modified Danya Khan RN (2/10/2017)   Next INR check 7/10/2017   Priority INR   Target end date Indefinite    Indications   Atrial fibrillation (H) [I48.91] [I48.91]  Long-term (current) use of anticoagulants [Z79.01] [Z79.01]         Anticoagulation Episode Summary     INR check location     Preferred lab     Send INR reminders to [facility]    Comments Patient contact number: [phone]   Can leave results with Rica (friend)      Anticoagulation Care Providers     Provider Role Specialty Phone number    Deedee Baird MD Responsible Cardiology [phone]            See the Encounter Report to view Anticoagulation Flowsheet and Dosing Calendar (Go to Encounters tab in chart review, and find the Anticoagulation Therapy Visit)    Spoke with Rica.    Danya Khan, RN

## 2017-06-19 NOTE — PATIENT INSTRUCTIONS
It was a pleasure to see you in clinic today.  Please do not hesitate to call with any questions or concerns.  You are scheduled for a remote transmission on 9/19/17.  We look forward to seeing you in clinic at your next device check in 6 months.    Kathy Bright, RN, MS, CCRN  Electrophysiology Nurse Clinician  [facility]    During Business Hours Please Call:  [phone]  After Hours Please Call:  [phone] - select option #[phone] and ask for job code [id number]

## 2017-06-19 NOTE — MR AVS SNAPSHOT
Noe Florence   6/19/2017   Anticoagulation Therapy Visit    Description:  82 year old male   Provider:  Danya Khan, RN   Department:  [facility]           INR as of 6/19/2017     Today's INR 2.07      Anticoagulation Summary as of 6/19/2017     INR goal 2.0-3.0   Today's INR 2.07   Full instructions 5 mg every day   Next INR check 7/10/2017    Indications   Atrial fibrillation (H) [I48.91] [I48.91]  Long-term (current) use of anticoagulants [Z79.01] [Z79.01]         June 2017 Details    Sun Mon Tue Wed Thu Fri Sat         1               2               3                 4               5               6               7               8               9               10                 11               12               13               14               15               16               17                 18               19      5 mg   See details      20      5 mg         21      5 mg         22      5 mg         23      5 mg         24      5 mg           25      5 mg         26      5 mg         27      5 mg         28      5 mg         29      5 mg         30      5 mg           Date Details   06/19 This INR check               How to take your warfarin dose     To take:  5 mg Take 1 of the 5 mg tablets.           July 2017 Details    Sun Mon Tue Wed Thu Fri Sat           1      5 mg           2      5 mg         3      5 mg         4      5 mg         5      5 mg         6      5 mg         7      5 mg         8      5 mg           9      5 mg         10            11               12               13               14               15                 16               17               18               19               20               21               22                 23               24               25               26               27               28               29                 30               31                     Date Details   No additional details    Date of next INR:   7/10/2017         How to take your warfarin dose     To take:  5 mg Take 1 of the 5 mg tablets.

## 2017-06-19 NOTE — PROGRESS NOTES
Patient seen in clinic for evaluation and iterative programming of his Medtronic dual lead ICD per MD orders.  Normal ICD function.  308 AT/AF episodes recorded - 4 sec - 3 min 3 sec in duration.  AF burden = <0.1%.  Intrinsic rhythm = SB with first degree AVB @ 58 bpm.  AP = 78.5%.   = 8.9%.  OptiVol fluid index is at baseline.  Battery voltage = 2.91 V.  Patient reports that he is feeling well and denies complaints.  Plan for patient to send a remote transmission in 3 months and return to clinic in 6 months.    Dual lead ICD

## 2017-07-10 ENCOUNTER — ANTICOAGULATION THERAPY VISIT (OUTPATIENT)
Dept: ANTICOAGULATION | Facility: CLINIC | Age: 82
End: 2017-07-10

## 2017-07-10 DIAGNOSIS — Z79.01 LONG-TERM (CURRENT) USE OF ANTICOAGULANTS: ICD-10-CM

## 2017-07-10 DIAGNOSIS — I48.91 ATRIAL FIBRILLATION, UNSPECIFIED TYPE (H): ICD-10-CM

## 2017-07-10 LAB — INR PPP: 2 (ref 0.86–1.14)

## 2017-07-10 NOTE — MR AVS SNAPSHOT
Noe Florence   7/10/2017   Anticoagulation Therapy Visit    Description:  82 year old male   Provider:  Danya Edwards RN   Department:  [facility]           INR as of 7/10/2017     Today's INR 2.00      Anticoagulation Summary as of 7/10/2017     INR goal 2.0-3.0   Today's INR 2.00   Full instructions 5 mg every day   Next INR check 7/31/2017    Indications   Atrial fibrillation (H) [I48.91] [I48.91]  Long-term (current) use of anticoagulants [Z79.01] [Z79.01]         July 2017 Details    Sun Mon Tue Wed Thu Fri Sat           1                 2               3               4               5               6               7               8                 9               10      5 mg   See details      11      5 mg         12      5 mg         13      5 mg         14      5 mg         15      5 mg           16      5 mg         17      5 mg         18      5 mg         19      5 mg         20      5 mg         21      5 mg         22      5 mg           23      5 mg         24      5 mg         25      5 mg         26      5 mg         27      5 mg         28      5 mg         29      5 mg           30      5 mg         31                  Date Details   07/10 This INR check       Date of next INR:  7/31/2017         How to take your warfarin dose     To take:  5 mg Take 1 of the 5 mg tablets.

## 2017-07-10 NOTE — PROGRESS NOTES
ANTICOAGULATION FOLLOW-UP CLINIC VISIT    Patient Name:  Noe Florence  Date:  7/10/2017  Contact Type:  Telephone    SUBJECTIVE:        OBJECTIVE    INR   Date Value Ref Range Status   07/10/2017 2.00 (H) 0.86 - 1.14 Final       ASSESSMENT / PLAN  INR assessment THER    Recheck INR In: 3 WEEKS    INR Location Clinic      Anticoagulation Summary as of 7/10/2017     INR goal 2.0-3.0   Today's INR 2.00   Maintenance plan 5 mg (5 mg x 1) every day   Full instructions 5 mg every day   Weekly total 35 mg   No change documented Danya Edwards RN   Plan last modified Danya Khan RN (2/10/2017)   Next INR check 7/31/2017   Priority INR   Target end date Indefinite    Indications   Atrial fibrillation (H) [I48.91] [I48.91]  Long-term (current) use of anticoagulants [Z79.01] [Z79.01]         Anticoagulation Episode Summary     INR check location     Preferred lab     Send INR reminders to [facility]    Comments Patient contact number: [phone]   Can leave results with Rica (friend)      Anticoagulation Care Providers     Provider Role Specialty Phone number    Deedee Baird MD Responsible Cardiology [phone]            See the Encounter Report to view Anticoagulation Flowsheet and Dosing Calendar (Go to Encounters tab in chart review, and find the Anticoagulation Therapy Visit)    Left message for patient with results and dosing recommendations. Asked patient to call back to report any missed doses, falls, signs and symptoms of bleeding or clotting, any changes in health, medication, or diet. Asked patient to call back with any questions or concerns.     Danya Edwards RN

## 2017-07-31 ENCOUNTER — ANTICOAGULATION THERAPY VISIT (OUTPATIENT)
Dept: ANTICOAGULATION | Facility: CLINIC | Age: 82
End: 2017-07-31

## 2017-07-31 DIAGNOSIS — I48.91 ATRIAL FIBRILLATION, UNSPECIFIED TYPE (H): ICD-10-CM

## 2017-07-31 DIAGNOSIS — Z79.01 LONG-TERM (CURRENT) USE OF ANTICOAGULANTS: ICD-10-CM

## 2017-07-31 LAB — INR PPP: 1.89 (ref 0.86–1.14)

## 2017-07-31 NOTE — PROGRESS NOTES
ANTICOAGULATION FOLLOW-UP CLINIC VISIT    Patient Name:  Noe Florence  Date:  7/31/2017  Contact Type:  Telephone    SUBJECTIVE:        OBJECTIVE    INR   Date Value Ref Range Status   07/31/2017 1.89 (H) 0.86 - 1.14 Final       ASSESSMENT / PLAN  INR assessment THER    Recheck INR In: 2 WEEKS    INR Location Clinic      Anticoagulation Summary as of 7/31/2017     INR goal 2.0-3.0   Today's INR 1.89!   Maintenance plan 7.5 mg (5 mg x 1.5) on Mon; 5 mg (5 mg x 1) all other days   Full instructions 7.5 mg on Mon; 5 mg all other days   Weekly total 37.5 mg   Plan last modified Roya Manning RN (7/31/2017)   Next INR check 8/14/2017   Priority INR   Target end date Indefinite    Indications   Atrial fibrillation (H) [I48.91] [I48.91]  Long-term (current) use of anticoagulants [Z79.01] [Z79.01]         Anticoagulation Episode Summary     INR check location     Preferred lab     Send INR reminders to [facility]    Comments Patient contact number: [phone]   Can leave results with Rica (friend)      Anticoagulation Care Providers     Provider Role Specialty Phone number    Deedee Baird MD Responsible Cardiology [phone]            See the Encounter Report to view Anticoagulation Flowsheet and Dosing Calendar (Go to Encounters tab in chart review, and find the Anticoagulation Therapy Visit)    Left message for patient with results and dosing recommendations. Asked patient to call back to report any missed doses, falls, signs and symptoms of bleeding or clotting, any changes in health, medication, or diet. Asked patient to call back with any questions or concerns.     Roya Manning, BYRON

## 2017-07-31 NOTE — MR AVS SNAPSHOT
Noe Florence   7/31/2017   Anticoagulation Therapy Visit    Description:  82 year old male   Provider:  Roya Manning, RN   Department:  [facility]           INR as of 7/31/2017     Today's INR 1.89!      Anticoagulation Summary as of 7/31/2017     INR goal 2.0-3.0   Today's INR 1.89!   Full instructions 7.5 mg on Mon; 5 mg all other days   Next INR check 8/14/2017    Indications   Atrial fibrillation (H) [I48.91] [I48.91]  Long-term (current) use of anticoagulants [Z79.01] [Z79.01]         July 2017 Details    Sun Mon Tue Wed Thu Fri Sat           1                 2               3               4               5               6               7               8                 9               10               11               12               13               14               15                 16               17               18               19               20               21               22                 23               24               25               26               27               28               29                 30               31      7.5 mg   See details            Date Details   07/31 This INR check               How to take your warfarin dose     To take:  7.5 mg Take 1.5 of the 5 mg tablets.           August 2017 Details    Sun Mon Tue Wed Thu Fri Sat       1      5 mg         2      5 mg         3      5 mg         4      5 mg         5      5 mg           6      5 mg         7      7.5 mg         8      5 mg         9      5 mg         10      5 mg         11      5 mg         12      5 mg           13      5 mg         14            15               16               17               18               19                 20               21               22               23               24               25               26                 27               28               29               30               31                  Date Details   No additional details    Date  of next INR:  8/14/2017         How to take your warfarin dose     To take:  5 mg Take 1 of the 5 mg tablets.    To take:  7.5 mg Take 1.5 of the 5 mg tablets.

## 2017-08-14 DIAGNOSIS — Z79.01 LONG-TERM (CURRENT) USE OF ANTICOAGULANTS: ICD-10-CM

## 2017-08-14 DIAGNOSIS — I48.91 ATRIAL FIBRILLATION, UNSPECIFIED TYPE (H): ICD-10-CM

## 2017-08-14 LAB — INR PPP: 2.93 (ref 0.86–1.14)

## 2017-08-15 ENCOUNTER — ANTICOAGULATION THERAPY VISIT (OUTPATIENT)
Dept: ANTICOAGULATION | Facility: CLINIC | Age: 82
End: 2017-08-15

## 2017-08-15 DIAGNOSIS — Z79.01 LONG-TERM (CURRENT) USE OF ANTICOAGULANTS: ICD-10-CM

## 2017-08-15 DIAGNOSIS — I48.91 ATRIAL FIBRILLATION, UNSPECIFIED TYPE (H): ICD-10-CM

## 2017-08-15 NOTE — MR AVS SNAPSHOT
Noe Florence   8/15/2017   Anticoagulation Therapy Visit    Description:  82 year old male   Provider:  Leena Abebe RN   Department:  [facility]           INR as of 8/15/2017     Today's INR 2.93 (8/14/2017)      Anticoagulation Summary as of 8/15/2017     INR goal 2.0-3.0   Today's INR 2.93 (8/14/2017)   Full instructions 8/21: 5 mg; Otherwise 7.5 mg on Mon; 5 mg all other days   Next INR check 8/29/2017    Indications   Atrial fibrillation (H) [I48.91] [I48.91]  Long-term (current) use of anticoagulants [Z79.01] [Z79.01]         August 2017 Details    Sun Mon Tue Wed Thu Fri Sat       1               2               3               4               5                 6               7               8               9               10               11               12                 13               14               15      5 mg   See details      16      5 mg         17      5 mg         18      5 mg         19      5 mg           20      5 mg         21      5 mg         22      5 mg         23      5 mg         24      5 mg         25      5 mg         26      5 mg           27      5 mg         28      7.5 mg         29            30               31                  Date Details   08/15 This INR check       Date of next INR:  8/29/2017         How to take your warfarin dose     To take:  5 mg Take 1 of the 5 mg tablets.    To take:  7.5 mg Take 1.5 of the 5 mg tablets.

## 2017-08-15 NOTE — PROGRESS NOTES
ANTICOAGULATION FOLLOW-UP CLINIC VISIT    Patient Name:  Noe Florence  Date:  8/15/2017  Contact Type:  Telephone    SUBJECTIVE:        OBJECTIVE    INR   Date Value Ref Range Status   08/14/2017 2.93 (H) 0.86 - 1.14 Final       ASSESSMENT / PLAN  INR assessment THER    Recheck INR In: 2 WEEKS    INR Location Clinic      Anticoagulation Summary as of 8/15/2017     INR goal 2.0-3.0   Today's INR 2.93 (8/14/2017)   Maintenance plan 7.5 mg (5 mg x 1.5) on Mon; 5 mg (5 mg x 1) all other days   Full instructions 8/21: 5 mg; Otherwise 7.5 mg on Mon; 5 mg all other days   Weekly total 37.5 mg   Plan last modified Roya Manning RN (7/31/2017)   Next INR check 8/29/2017   Priority INR   Target end date Indefinite    Indications   Atrial fibrillation (H) [I48.91] [I48.91]  Long-term (current) use of anticoagulants [Z79.01] [Z79.01]         Anticoagulation Episode Summary     INR check location     Preferred lab     Send INR reminders to [facility]    Comments Patient contact number: [phone]   Can leave results with Rica (friend)      Anticoagulation Care Providers     Provider Role Specialty Phone number    Deedee Baird MD Responsible Cardiology [phone]            See the Encounter Report to view Anticoagulation Flowsheet and Dosing Calendar (Go to Encounters tab in chart review, and find the Anticoagulation Therapy Visit)  Spoke with Rica.    Leena Abebe RN

## 2017-08-30 ENCOUNTER — ANTICOAGULATION THERAPY VISIT (OUTPATIENT)
Dept: ANTICOAGULATION | Facility: CLINIC | Age: 82
End: 2017-08-30

## 2017-08-30 DIAGNOSIS — Z79.01 LONG-TERM (CURRENT) USE OF ANTICOAGULANTS: ICD-10-CM

## 2017-08-30 DIAGNOSIS — I48.91 ATRIAL FIBRILLATION, UNSPECIFIED TYPE (H): ICD-10-CM

## 2017-08-30 LAB — INR PPP: 3.17 (ref 0.86–1.14)

## 2017-08-30 NOTE — PROGRESS NOTES
ANTICOAGULATION FOLLOW-UP CLINIC VISIT    Patient Name:  Noe Florence  Date:  8/30/2017  Contact Type:  Telephone    SUBJECTIVE:        OBJECTIVE    INR   Date Value Ref Range Status   08/30/2017 3.17 (H) 0.86 - 1.14 Final       ASSESSMENT / PLAN  INR assessment SUPRA    Recheck INR In: 2 WEEKS    INR Location Clinic      Anticoagulation Summary as of 8/30/2017     INR goal 2.0-3.0   Today's INR 3.17!   Maintenance plan 5 mg (5 mg x 1) every day   Full instructions 5 mg every day   Weekly total 35 mg   Plan last modified Roya Manning RN (8/30/2017)   Next INR check 9/13/2017   Priority INR   Target end date Indefinite    Indications   Atrial fibrillation (H) [I48.91] [I48.91]  Long-term (current) use of anticoagulants [Z79.01] [Z79.01]         Anticoagulation Episode Summary     INR check location     Preferred lab     Send INR reminders to [facility]    Comments Patient contact number: [phone]   Can leave results with Rica (friend)      Anticoagulation Care Providers     Provider Role Specialty Phone number    Deedee Baird MD Responsible Cardiology [phone]            See the Encounter Report to view Anticoagulation Flowsheet and Dosing Calendar (Go to Encounters tab in chart review, and find the Anticoagulation Therapy Visit)    Left message for patient with results and dosing recommendations. Asked patient to call back to report any missed doses, falls, signs and symptoms of bleeding or clotting, any changes in health, medication, or diet. Asked patient to call back with any questions or concerns.     Roya Manning RN

## 2017-08-30 NOTE — MR AVS SNAPSHOT
Noe Florence   8/30/2017   Anticoagulation Therapy Visit    Description:  82 year old male   Provider:  Roya Manning, RN   Department:  [facility]           INR as of 8/30/2017     Today's INR 3.17!      Anticoagulation Summary as of 8/30/2017     INR goal 2.0-3.0   Today's INR 3.17!   Full instructions 5 mg every day   Next INR check 9/13/2017    Indications   Atrial fibrillation (H) [I48.91] [I48.91]  Long-term (current) use of anticoagulants [Z79.01] [Z79.01]         August 2017 Details    Sun Mon Tue Wed Thu Fri Sat       1               2               3               4               5                 6               7               8               9               10               11               12                 13               14               15               16               17               18               19                 20               21               22               23               24               25               26                 27               28               29               30      5 mg   See details      31      5 mg            Date Details   08/30 This INR check               How to take your warfarin dose     To take:  5 mg Take 1 of the 5 mg tablets.           September 2017 Details    Sun Mon Tue Wed Thu Fri Sat          1      5 mg         2      5 mg           3      5 mg         4      5 mg         5      5 mg         6      5 mg         7      5 mg         8      5 mg         9      5 mg           10      5 mg         11      5 mg         12      5 mg         13            14               15               16                 17               18               19               20               21               22               23                 24               25               26               27               28               29               30                Date Details   No additional details    Date of next INR:  9/13/2017         How to take  your warfarin dose     To take:  5 mg Take 1 of the 5 mg tablets.

## 2017-09-19 ENCOUNTER — ALLIED HEALTH/NURSE VISIT (OUTPATIENT)
Dept: CARDIOLOGY | Facility: CLINIC | Age: 82
End: 2017-09-19
Attending: INTERNAL MEDICINE
Payer: COMMERCIAL

## 2017-09-19 DIAGNOSIS — I25.5 ISCHEMIC CARDIOMYOPATHY: Primary | ICD-10-CM

## 2017-09-19 PROCEDURE — 93295 DEV INTERROG REMOTE 1/2/MLT: CPT | Performed by: INTERNAL MEDICINE

## 2017-09-19 PROCEDURE — 93296 REM INTERROG EVL PM/IDS: CPT | Mod: ZF

## 2017-09-19 NOTE — PROGRESS NOTES
Remote ICD transmission received and reviewed.  Device transmission sent per MD orders.  Patient has a Medtronic dual lead ICD.  Normal ICD function.  3 episodes recorded as VT that appear to be SVT - 28-42 sec, 149-151 bpm.  42 AT/AF episodes recorded - total AT/AF time = 2 minutes.  Patient is taking Coumadin.  Presenting EGM = AP-VS @ 64 bpm.  AP = 69.5%.   = 8.2%.  OptiVol fluid index is slightly above baseline.  Battery voltage = 2.84 V.  Patient's family member notified of interrogation results.  She reports that the patient is feeling well and denies complaints.  Plan for patient to return to clinic in 3 months as scheduled.    Remote ICD transmission

## 2017-09-19 NOTE — MR AVS SNAPSHOT
After Visit Summary   9/19/2017    Noe Florence    MRN: [id number]           Patient Information     Date Of Birth          1935        Visit Information        Provider Department      9/19/2017 6:00 AM [facility] REMOTE [facility]        Today's Diagnoses     Ischemic cardiomyopathy    -  1       Follow-ups after your visit        Your next 10 appointments already scheduled     Sep 26, 2017  1:15 PM CDT   LAB with  LAB   [facility] ([facility])    [address] [place] [id number] [phone]           Patient must bring picture ID. Patient should be prepared to give a urine specimen  Please do not eat 10-12 hours before your appointment if you are coming in fasting for labs on lipids, cholesterol, or glucose (sugar). Pregnant women should follow their Care Team instructions. Water with medications is okay. Do not drink coffee or other fluids. If you have concerns about taking  your medications, please ask at office or if scheduling via [employer], send a message by clicking on Secure Messaging, Message Your Care Team.            Sep 26, 2017  1:40 PM CDT   (Arrive by 1:25 PM)   CT CHEST ABDOMEN PELVIS W/O & W CONTRAST with CT1   [facility] ([facility])    [address] [place] [address] [phone]           Please bring any scans or X-rays taken at other hospitals, if similar tests were done. Also bring a list of your medicines, including vitamins, minerals and over-the-counter drugs. It is safest to leave personal items at home.  Be sure to tell your doctor:   If you have any allergies.   If there s any chance you are pregnant.   If you are breastfeeding.   If you have any special needs.  You may have contrast for this exam. To prepare:   Do not eat or drink for 2 hours before your exam. If you need to take medicine, you may take it with small sips of water.  (We may ask you to take liquid medicine as well.)   The day before your exam, drink extra fluids at least six 8-ounce glasses (unless your doctor tells you to restrict your fluids).  Patients over 70 or patients with diabetes or kidney problems:   If you haven t had a blood test (creatinine test) within the last 30 days, go to your clinic or Diagnostic Imaging Department for this test.  If you have diabetes:   If your kidney function is normal, continue taking your metformin (Avandamet, Glucophage, Glucovance, Metaglip) on the day of your exam.   If your kidney function is abnormal, wait 48 hours before restarting this medicine.  You will have oral contrast for this exam:   You will drink the contrast at home. Get this from your clinic or Diagnostic Imaging Department. Please follow the directions given.  Please wear loose clothing, such as a sweat suit or jogging clothes. Avoid snaps, zippers and other metal. We may ask you to undress and put on a hospital gown.  If you have any questions, please call the Imaging Department where you will have your exam.            Oct 03, 2017 11:20 AM CDT   (Arrive by 11:05 AM)   Return Visit with MARIA LUISA Lackey [facility] ([facility])    [address]  2nd Floor  [facility] [address] [phone]            Oct 05, 2017  1:00 PM CDT   RETURN GENERAL with Omero Srinivasan MD   [facility] ([facility])    [address]  [address]  9th [address] Clin [address]  [facility] [address]   [phone]            Dec 04, 2017 11:00 AM CST   (Arrive by 10:45 AM)   Implanted Defibulator with Uc Cv Device 1   [facility] ([facility])    [address]  3rd Floor  [facility] [address] [phone]            Dec 04, 2017 11:40 AM CST   (Arrive by 11:25 AM)   RETURN ARRHYTHMIA with Deedee Baird MD   [facility])     [address] [place] [id number] [phone]              Who to contact     If you have questions or need follow up information about today's clinic visit or your schedule please contact [facility] directly at [phone].  Normal or non-critical lab and imaging results will be communicated to you by MyChart, letter or phone within 4 business days after the clinic has received the results. If you do not hear from us within 7 days, please contact the clinic through [employer] or phone. If you have a critical or abnormal lab result, we will notify you by phone as soon as possible.  Submit refill requests through [employer] or call your pharmacy and they will forward the refill request to us. Please allow 3 business days for your refill to be completed.          Additional Information About Your Visit        [employer] Information     [employer] gives you secure access to your electronic health record. If you see a primary care provider, you can also send messages to your care team and make appointments. If you have questions, please call your primary care clinic.  If you do not have a primary care provider, please call [phone] and they will assist you.        Care EveryWhere ID     This is your Care EveryWhere ID. This could be used by other organizations to access your [place] medical records  [id number]         Blood Pressure from Last 3 Encounters:   04/03/17 105/56   01/25/17 99/59   12/19/16 119/67    Weight from Last 3 Encounters:   04/03/17 70.7 kg (155 lb 13.8 oz)   01/25/17 68.9 kg (151 lb 12.8 oz)   12/19/16 69.6 kg (153 lb 6.4 oz)              We Performed the Following     INTERROGATION DEVICE EVAL REMOTE, PACER/ICD          Today's Medication Changes          These changes are accurate as of: 9/19/17  2:05 PM.  If you have any questions, ask your nurse or doctor.               These medicines have changed or have updated prescriptions.        Dose/Directions     fluticasone 50 MCG/ACT spray   Commonly known as:  FLONASE   This may have changed:    - when to take this  - reasons to take this   Used for:  Unspecified sinusitis (chronic)        Dose:  2 spray   Spray 2 sprays into both nostrils daily   Quantity:  3 Package   Refills:  0                Primary Care Provider Office Phone # Fax #    Roberto Fabiano Sarmiento [phone]       [address] [place] [address]  [facility] [address]        Equal Access to Services     LEE CASTILLO AH: Hadii aad ku hadasho Soomaali, waaxda luqadaha, qaybta kaalmada adeegyada, waxay idiin hayaan adeidalia kharash lamaxine . So [facility] [phone].    ATENCIÓN: Si habla español, tiene a aguirre disposición servicios gratuitos de asistencia lingüística. [facility] [phone].    We comply with applicable federal civil rights laws and [place] laws. We do not discriminate on the basis of race, color, national origin, age, disability sex, sexual orientation or gender identity.            Thank you!     Thank you for choosing [facility]  for your care. Our goal is always to provide you with excellent care. Hearing back from our patients is one way we can continue to improve our services. Please take a few minutes to complete the written survey that you may receive in the mail after your visit with us. Thank you!             Your Updated Medication List - Protect others around you: Learn how to safely use, store and throw away your medicines at www.disposemymeds.org.          This list is accurate as of: 9/19/17  2:05 PM.  Always use your most recent med list.                   Brand Name Dispense Instructions for use Diagnosis    aspirin 81 MG tablet      Take 1 tablet by mouth daily.        atorvastatin 40 MG tablet    LIPITOR    90 tablet    Take 1 tablet (40 mg) by mouth daily    Hyperlipidemia, unspecified hyperlipidemia type       Blood Pressure Monitor Kit     1 kit    1 Units daily    Hypertension       CENTRUM SILVER per tablet       Take 1 tablet by mouth daily. AM        ciclopirox 0.77 % cream    LOPROX    90 g    Apply topically 2 times daily To feet and toenails.    Dermatophytosis of nail, Tinea pedis of both feet       cyanocobalamin 1000 MCG tablet    vitamin  B-12    180 tablet    Take 2 tablets (2,000 mcg) by mouth daily    Anemia       doxazosin 2 MG tablet    CARDURA    90 tablet    Take 1/2 tab in the morning and 1/2 tab in the evening    Essential hypertension       fluticasone 50 MCG/ACT spray    FLONASE    3 Package    Spray 2 sprays into both nostrils daily    Unspecified sinusitis (chronic)       ketoconazole 2 % cream    NIZORAL    60 g    Apply topically daily On hold    Tinea corporis       loratadine 10 MG tablet    CLARITIN     Take 10 mg by mouth as needed        metoprolol 25 MG tablet    LOPRESSOR    180 tablet    Take 1 tablet (25 mg) by mouth 2 times daily    Coronary artery disease involving native heart without angina pectoris, unspecified vessel or lesion type       mirtazapine 15 MG tablet    REMERON     Take 15 mg by mouth At Bedtime.        sulfamethoxazole-trimethoprim 400-80 MG per tablet    BACTRIM/SEPTRA    14 tablet    Take 1 tablet by mouth 2 times daily    Cellulitis, unspecified cellulitis site       triamcinolone 0.1 % cream    KENALOG    80 g    Apply topically 2 times daily For up to two weeks in a row    Atopic eczema       warfarin 5 MG tablet    COUMADIN    35 tablet    7.5 mg on Wed; 5 mg all other days  or as instructed by coumadin clinic.    Atrial flutter (H)       ZOLOFT 100 MG tablet   Generic drug:  sertraline      Take 100 mg by mouth every evening.

## 2017-09-22 ENCOUNTER — ANTICOAGULATION THERAPY VISIT (OUTPATIENT)
Dept: ANTICOAGULATION | Facility: CLINIC | Age: 82
End: 2017-09-22

## 2017-09-22 DIAGNOSIS — Z79.01 LONG-TERM (CURRENT) USE OF ANTICOAGULANTS: ICD-10-CM

## 2017-09-22 DIAGNOSIS — I48.91 ATRIAL FIBRILLATION, UNSPECIFIED TYPE (H): ICD-10-CM

## 2017-09-22 LAB — INR PPP: 2.62 (ref 0.86–1.14)

## 2017-09-22 NOTE — MR AVS SNAPSHOT
Noe Florence   9/22/2017   Anticoagulation Therapy Visit    Description:  82 year old male   Provider:  Leena Abebe RN   Department:  [facility]           INR as of 9/22/2017     Today's INR 2.62      Anticoagulation Summary as of 9/22/2017     INR goal 2.0-3.0   Today's INR 2.62   Full instructions 5 mg every day   Next INR check 10/13/2017    Indications   Atrial fibrillation (H) [I48.91] [I48.91]  Long-term (current) use of anticoagulants [Z79.01] [Z79.01]         September 2017 Details    Sun Mon Tue Wed Thu Fri Sat          1               2                 3               4               5               6               7               8               9                 10               11               12               13               14               15               16                 17               18               19               20               21               22      5 mg   See details      23      5 mg           24      5 mg         25      5 mg         26      5 mg         27      5 mg         28      5 mg         29      5 mg         30      5 mg          Date Details   09/22 This INR check               How to take your warfarin dose     To take:  5 mg Take 1 of the 5 mg tablets.           October 2017 Details    Sun Mon Tue Wed Thu Fri Sat     1      5 mg         2      5 mg         3      5 mg         4      5 mg         5      5 mg         6      5 mg         7      5 mg           8      5 mg         9      5 mg         10      5 mg         11      5 mg         12      5 mg         13            14                 15               16               17               18               19               20               21                 22               23               24               25               26               27               28                 29               30               31                    Date Details   No additional details    Date of next INR:   10/13/2017         How to take your warfarin dose     To take:  5 mg Take 1 of the 5 mg tablets.

## 2017-09-22 NOTE — PROGRESS NOTES
ANTICOAGULATION FOLLOW-UP CLINIC VISIT    Patient Name:  Noe Florence  Date:  9/22/2017  Contact Type:  Telephone    SUBJECTIVE:     Patient Findings     Positives No Problem Findings           OBJECTIVE    INR   Date Value Ref Range Status   09/22/2017 2.62 (H) 0.86 - 1.14 Final       ASSESSMENT / PLAN  INR assessment THER    Recheck INR In: 3 WEEKS    INR Location Clinic      Anticoagulation Summary as of 9/22/2017     INR goal 2.0-3.0   Today's INR 2.62   Maintenance plan 5 mg (5 mg x 1) every day   Full instructions 5 mg every day   Weekly total 35 mg   Plan last modified Roya Manning RN (8/30/2017)   Next INR check 10/13/2017   Priority INR   Target end date Indefinite    Indications   Atrial fibrillation (H) [I48.91] [I48.91]  Long-term (current) use of anticoagulants [Z79.01] [Z79.01]         Anticoagulation Episode Summary     INR check location     Preferred lab     Send INR reminders to [facility]    Comments Patient contact number: [phone]   Can leave results with Rica (friend)      Anticoagulation Care Providers     Provider Role Specialty Phone number    Deedee Baird MD Responsible Cardiology [phone]            See the Encounter Report to view Anticoagulation Flowsheet and Dosing Calendar (Go to Encounters tab in chart review, and find the Anticoagulation Therapy Visit)  Spoke with patient.    Leena Abebe RN

## 2017-09-26 DIAGNOSIS — C18.9 MALIGNANT NEOPLASM OF COLON, UNSPECIFIED PART OF COLON (H): ICD-10-CM

## 2017-09-26 LAB
ALBUMIN SERPL-MCNC: 3.3 G/DL (ref 3.4–5)
ALP SERPL-CCNC: 121 U/L (ref 40–150)
ALT SERPL W P-5'-P-CCNC: 17 U/L (ref 0–70)
ANION GAP SERPL CALCULATED.3IONS-SCNC: 6 MMOL/L (ref 3–14)
AST SERPL W P-5'-P-CCNC: 15 U/L (ref 0–45)
BASOPHILS # BLD AUTO: 0 10E9/L (ref 0–0.2)
BASOPHILS NFR BLD AUTO: 0.3 %
BILIRUB SERPL-MCNC: 0.7 MG/DL (ref 0.2–1.3)
BUN SERPL-MCNC: 41 MG/DL (ref 7–30)
CALCIUM SERPL-MCNC: 8.7 MG/DL (ref 8.5–10.1)
CEA SERPL-MCNC: 4 UG/L (ref 0–2.5)
CHLORIDE SERPL-SCNC: 111 MMOL/L (ref 94–109)
CO2 SERPL-SCNC: 27 MMOL/L (ref 20–32)
CREAT SERPL-MCNC: 1.19 MG/DL (ref 0.66–1.25)
DIFFERENTIAL METHOD BLD: ABNORMAL
EOSINOPHIL # BLD AUTO: 0.2 10E9/L (ref 0–0.7)
EOSINOPHIL NFR BLD AUTO: 3.4 %
ERYTHROCYTE [DISTWIDTH] IN BLOOD BY AUTOMATED COUNT: 14.2 % (ref 10–15)
GFR SERPL CREATININE-BSD FRML MDRD: 58 ML/MIN/1.7M2
GLUCOSE SERPL-MCNC: 91 MG/DL (ref 70–99)
HCT VFR BLD AUTO: 34 % (ref 40–53)
HGB BLD-MCNC: 10.8 G/DL (ref 13.3–17.7)
IMM GRANULOCYTES # BLD: 0 10E9/L (ref 0–0.4)
IMM GRANULOCYTES NFR BLD: 0.3 %
LYMPHOCYTES # BLD AUTO: 0.7 10E9/L (ref 0.8–5.3)
LYMPHOCYTES NFR BLD AUTO: 11.3 %
MCH RBC QN AUTO: 30.9 PG (ref 26.5–33)
MCHC RBC AUTO-ENTMCNC: 31.8 G/DL (ref 31.5–36.5)
MCV RBC AUTO: 97 FL (ref 78–100)
MONOCYTES # BLD AUTO: 0.7 10E9/L (ref 0–1.3)
MONOCYTES NFR BLD AUTO: 10.7 %
NEUTROPHILS # BLD AUTO: 4.8 10E9/L (ref 1.6–8.3)
NEUTROPHILS NFR BLD AUTO: 74 %
NRBC # BLD AUTO: 0 10*3/UL
NRBC BLD AUTO-RTO: 0 /100
PLATELET # BLD AUTO: 215 10E9/L (ref 150–450)
POTASSIUM SERPL-SCNC: 4.2 MMOL/L (ref 3.4–5.3)
PROT SERPL-MCNC: 7.5 G/DL (ref 6.8–8.8)
RBC # BLD AUTO: 3.5 10E12/L (ref 4.4–5.9)
SODIUM SERPL-SCNC: 144 MMOL/L (ref 133–144)
WBC # BLD AUTO: 6.5 10E9/L (ref 4–11)

## 2017-10-03 ENCOUNTER — ONCOLOGY VISIT (OUTPATIENT)
Dept: ONCOLOGY | Facility: CLINIC | Age: 82
End: 2017-10-03
Attending: NURSE PRACTITIONER
Payer: COMMERCIAL

## 2017-10-03 VITALS
HEIGHT: 68 IN | HEART RATE: 78 BPM | TEMPERATURE: 98 F | OXYGEN SATURATION: 98 % | BODY MASS INDEX: 23.13 KG/M2 | DIASTOLIC BLOOD PRESSURE: 69 MMHG | SYSTOLIC BLOOD PRESSURE: 119 MMHG | RESPIRATION RATE: 18 BRPM | WEIGHT: 152.6 LBS

## 2017-10-03 DIAGNOSIS — C18.9 MALIGNANT NEOPLASM OF COLON, UNSPECIFIED PART OF COLON (H): Primary | ICD-10-CM

## 2017-10-03 DIAGNOSIS — L98.9 SKIN LESION: ICD-10-CM

## 2017-10-03 PROCEDURE — 99213 OFFICE O/P EST LOW 20 MIN: CPT | Mod: ZF

## 2017-10-03 PROCEDURE — 99214 OFFICE O/P EST MOD 30 MIN: CPT | Mod: ZP | Performed by: NURSE PRACTITIONER

## 2017-10-03 ASSESSMENT — PAIN SCALES - GENERAL: PAINLEVEL: NO PAIN (0)

## 2017-10-03 NOTE — NURSING NOTE
"Oncology Rooming Note    October 3, 2017 11:29 AM   Noe Florence is a 82 year old male who presents for:    Chief Complaint   Patient presents with     Oncology Clinic Visit     Return visit related to Colon Cancer     Initial Vitals: /69  Pulse 78  Temp 98  F (36.7  C) (Oral)  Resp 18  Ht 1.727 m (5' 7.99\")  Wt 69.2 kg (152 lb 9.6 oz)  SpO2 98%  BMI 23.21 kg/m2 Estimated body mass index is 23.21 kg/(m^2) as calculated from the following:    Height as of this encounter: 1.727 m (5' 7.99\").    Weight as of this encounter: 69.2 kg (152 lb 9.6 oz). Body surface area is 1.82 meters squared.  No Pain (0) Comment: Data Unavailable   No LMP for male patient.  Allergies reviewed: Yes  Medications reviewed: Yes    Medications: Medication refills not needed today.  Pharmacy name entered into EPIC:    [facility] [place], [place] - [address] [place] [address] [phone]  [facility] [place], [place] - [address]    Clinical concerns: No new concerns. Provider was notified.    10 minutes for nursing intake (face to face time)     Pennie Hoang LPN            "

## 2017-10-03 NOTE — PROGRESS NOTES
Reason for Visit: seen in follow-up of resected stage II colon cancer.     Oncology HPI: Noe Florence is a 82 year old male with a history of stage II colon cancer s/p colectomy. He did not have adjuvant chemotherapy. Imaging in 8/2015 demonstrated a new 5 mm liver lesion in the right lobe. His CEA has been elevated and CT imaging has shown resolution of the lesion. We have been following him since then without evidence of recurrence    Interval history: Noe has been feeling fairly well. Energy and appetite are good. Weight is stable. Bowels are regular. No blood in the stool. No hematuria, but has occasional frequency. No dysuria. Urine stream is slower than it used to be. No cough, congestion, SOB, CP, palpitations. No headaches, vision changes. No dizziness, no focal weakness or neuropathy. For the past 2 years has noted some redness on the shins. Has seen a dermatologist and prescribed a cream. He hasn't found it helpful. He isn't sure of the name of cream,but it starts with A.     He is here with his wife today.      Current Outpatient Prescriptions   Medication Sig Dispense Refill     warfarin (COUMADIN) 5 MG tablet 7.5 mg on Wed; 5 mg all other days  or as instructed by coumadin clinic. 35 tablet 5     Blood Pressure Monitor KIT 1 Units daily 1 kit 0     cyanocobalamin (VITAMIN  B-12) 1000 MCG tablet Take 2 tablets (2,000 mcg) by mouth daily 180 tablet 3     sulfamethoxazole-trimethoprim (BACTRIM,SEPTRA) 400-80 MG per tablet Take 1 tablet by mouth 2 times daily 14 tablet 1     doxazosin (CARDURA) 2 MG tablet Take 1/2 tab in the morning and 1/2 tab in the evening 90 tablet 3     atorvastatin (LIPITOR) 40 MG tablet Take 1 tablet (40 mg) by mouth daily 90 tablet 3     metoprolol (LOPRESSOR) 25 MG tablet Take 1 tablet (25 mg) by mouth 2 times daily 180 tablet 3     ciclopirox (LOPROX) 0.77 % cream Apply topically 2 times daily To feet and toenails. 90 g 6     triamcinolone (KENALOG) 0.1 % cream Apply  "topically 2 times daily For up to two weeks in a row 80 g 3     ketoconazole (NIZORAL) 2 % cream Apply topically daily On hold 60 g 3     loratadine (CLARITIN) 10 MG tablet Take 10 mg by mouth as needed        fluticasone (FLONASE) 50 MCG/ACT nasal spray Spray 2 sprays into both nostrils daily (Patient taking differently: Spray 2 sprays into both nostrils as needed ) 3 Package 0     Multiple Vitamins-Minerals (CENTRUM SILVER) per tablet Take 1 tablet by mouth daily. AM        aspirin 81 MG tablet Take 1 tablet by mouth daily.       mirtazapine (REMERON) 15 MG tablet Take 15 mg by mouth At Bedtime.       sertraline (ZOLOFT) 100 MG tablet Take 100 mg by mouth every evening.            Allergies   Allergen Reactions     Latex Rash     Rash         Exam: alert, appears well.  Blood pressure 119/69, pulse 78, temperature 98  F (36.7  C), temperature source Oral, resp. rate 18, height 1.727 m (5' 7.99\"), weight 69.2 kg (152 lb 9.6 oz), SpO2 98 %.  Wt Readings from Last 4 Encounters:   10/03/17 69.2 kg (152 lb 9.6 oz)   04/03/17 70.7 kg (155 lb 13.8 oz)   01/25/17 68.9 kg (151 lb 12.8 oz)   12/19/16 69.6 kg (153 lb 6.4 oz)     Oropharynx is moist, no focal lesion. No icterus. Neck supple and without adenopathy. Lungs:CTA. Heart: RRR, no murmur or rub. Abdomen: soft, nontender, BS active. No masses or organomegaly. Extremities: warm, no edema. Speech is clear. CN wnl. Gait/station wnl. No neck, supraclavicular, axillae nodes.    Labs:   Results for LEROY MACHADO (MRN [id number]) as of 10/3/2017 11:21   Ref. Range 12/18/2015 12:03 3/16/2016 11:33 9/19/2016 10:31 9/26/2016 17:08 9/26/2017 13:33   CEA Latest Ref Range: 0 - 2.5 ug/L 3.8 (H) 2.6 (H) 2.8 (H) 3.4 (H) 4.0 (H)     Results for CARTER LEROY AGAHTA (MRN [id number]) as of 10/3/2017 11:21   Ref. Range 9/26/2017 13:33   Sodium Latest Ref Range: 133 - 144 mmol/L 144   Potassium Latest Ref Range: 3.4 - 5.3 mmol/L 4.2   Chloride Latest Ref Range: 94 - 109 mmol/L 111 (H) "   Carbon Dioxide Latest Ref Range: 20 - 32 mmol/L 27   Urea Nitrogen Latest Ref Range: 7 - 30 mg/dL 41 (H)   Creatinine Latest Ref Range: 0.66 - 1.25 mg/dL 1.19   GFR Estimate Latest Ref Range: >60 mL/min/1.7m2 58 (L)   GFR Estimate If Black Latest Ref Range: >60 mL/min/1.7m2 71   Calcium Latest Ref Range: 8.5 - 10.1 mg/dL 8.7   Anion Gap Latest Ref Range: 3 - 14 mmol/L 6   Albumin Latest Ref Range: 3.4 - 5.0 g/dL 3.3 (L)   Protein Total Latest Ref Range: 6.8 - 8.8 g/dL 7.5   Bilirubin Total Latest Ref Range: 0.2 - 1.3 mg/dL 0.7   Alkaline Phosphatase Latest Ref Range: 40 - 150 U/L 121   ALT Latest Ref Range: 0 - 70 U/L 17   AST Latest Ref Range: 0 - 45 U/L 15   Glucose Latest Ref Range: 70 - 99 mg/dL 91   WBC Latest Ref Range: 4.0 - 11.0 10e9/L 6.5   Hemoglobin Latest Ref Range: 13.3 - 17.7 g/dL 10.8 (L)   Hematocrit Latest Ref Range: 40.0 - 53.0 % 34.0 (L)   Platelet Count Latest Ref Range: 150 - 450 10e9/L 215   RBC Count Latest Ref Range: 4.4 - 5.9 10e12/L 3.50 (L)   MCV Latest Ref Range: 78 - 100 fl 97   MCH Latest Ref Range: 26.5 - 33.0 pg 30.9   MCHC Latest Ref Range: 31.5 - 36.5 g/dL 31.8   RDW Latest Ref Range: 10.0 - 15.0 % 14.2   Diff Method Unknown Automated Method   % Neutrophils Latest Units: % 74.0   % Lymphocytes Latest Units: % 11.3   % Monocytes Latest Units: % 10.7   % Eosinophils Latest Units: % 3.4   % Basophils Latest Units: % 0.3   % Immature Granulocytes Latest Units: % 0.3   Nucleated RBCs Latest Ref Range: 0 /100 0   Absolute Neutrophil Latest Ref Range: 1.6 - 8.3 10e9/L 4.8   Absolute Lymphocytes Latest Ref Range: 0.8 - 5.3 10e9/L 0.7 (L)   Absolute Monocytes Latest Ref Range: 0.0 - 1.3 10e9/L 0.7   Absolute Eosinophils Latest Ref Range: 0.0 - 0.7 10e9/L 0.2   Absolute Basophils Latest Ref Range: 0.0 - 0.2 10e9/L 0.0   Abs Immature Granulocytes Latest Ref Range: 0 - 0.4 10e9/L 0.0   Absolute Nucleated RBC Unknown 0.0   CEA Latest Ref Range: 0 - 2.5 ug/L 4.0 (H)     Imaging: EXAMINATION: CT  CHEST/ABDOMEN/PELVIS W CONTRAST, 9/26/2017 2:02 PM     TECHNIQUE: Helical CT images from the thoracic inlet through the  symphysis pubis were obtained with intravenous contrast. Contrast  dose: [id number]-[id number]  95cc     COMPARISON: 3/31/2017 CT chest/abdomen/pelvis     HISTORY: Malignant neoplasm of colon, unspecified     FINDINGS:     Chest: No pleural effusion or pneumothorax. No focal consolidation.  Unchanged pleural thickening along the posterior aspect of the left  lower lobe. Stable linear atelectasis versus scarring in the left  lower lobe. Mild biapical scarring. No new or enlarging pulmonary  nodule. The central tracheobronchial tree is clear. No bronchial wall  thickening or bronchiectasis.     Stable mild cardiac enlargement. No pericardial effusion. Normal  caliber ascending aorta and main pulmonary artery. Stable prominence  of the right and left pulmonary arteries. No central pulmonary  embolism. Prosthetic mitral valve. Epicardial leads course along the  anterior aspect of the heart. Dual-lead automatic implantable cardiac  defibrillator with tips in the right ventricle and right atrium.  Coronary artery stents. No lymphadenopathy in the chest. Median  sternotomy wires.     Abdomen and pelvis: The liver, gallbladder, spleen, pancreas, and  right adrenal gland are unremarkable. Unchanged hypodense nodular  thickening of the left adrenal gland, likely adenomatous change.  Stable round well-circumscribed nonenhancing hypodensity in the upper  pole of the left kidney, favored to represent a cyst. No  hydronephrosis, hydroureter, or urinary tract stone. The bladder is  decompressed. Enlarged, heterogeneous prostate with dystrophic  calcifications. Trace free fluid in the pelvis, decreased since prior.  Stable surgical changes of partial left colectomy. Scattered colonic  diverticula. No bowel obstruction or inflammation. Normal appendix.  Small moderate hiatal hernia. The major abdominal vessels are  patent.  Moderate atherosclerotic calcification of the abdominal aorta without  aneurysmal dilation. Stable saccular aneurysm of the left common iliac  artery measuring up to 2.2 cm. No retroperitoneal, mesenteric, pelvic,  or inguinal lymphadenopathy.     Bones and soft tissues: No acute or aggressive osseous lesion. Bone  island in the left femoral head. Moderate osseous degenerative change.         IMPRESSION:   1. In this patient with history of colon cancer status post sigmoid  colectomy, no evidence of recurrent or metastatic disease.  2. 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  3. Small to moderate hiatal hernia.     I have personally reviewed the examination and initial interpretation  and I agree with the findings.     EDDIE ODOM MD    Impression/plan:   1. Resected stage II colon cancer   -no evidence of recurrence on exam or imaging findings. Of some concern is the rising CEA. In the absence of other findings, will opt to check this again in a couple months. If it continues to rise, will plan on a CT in about 3 months, otherwise will see him again with a CT CAP and labs in 6 months.    2. Derm: 2 year hx of skin rash on legs. Looks like possible psoriasis on the upper shins/knees, but not on the lower shin  -he would like to see a dermatologist here to recommend treatment. He also has multiple seborrheic keratoses and would like some bothersome ones on the neck removed.  -will set up a referral at his convienience

## 2017-10-03 NOTE — MR AVS SNAPSHOT
After Visit Summary   10/3/2017    Noe Florence    MRN: [id number]           Patient Information     Date Of Birth          1935        Visit Information        Provider Department      10/3/2017 11:20 AM Angie Flower APRN CNP [facility]        Today's Diagnoses     Malignant neoplasm of colon, unspecified part of colon (H)    -  1    Skin lesion           Follow-ups after your visit        Additional Services     DERMATOLOGY REFERRAL       Your provider has referred you to: [facility]: Dermatology Clinic - [place] [phone]   [email or web address]  Establish care for a skin exam, discuss management of psoriasis    Please be aware that coverage of these services is subject to the terms and limitations of your health insurance plan.  Call member services at your health plan with any benefit or coverage questions.      Please bring the following with you to your appointment:    (1) Any X-Rays, CTs or MRIs which have been performed.  Contact the facility where they were done to arrange for  prior to your scheduled appointment.    (2) List of current medications  (3) This referral request   (4) Any documents/labs given to you for this referral                  Your next 10 appointments already scheduled     Oct 04, 2017  2:00 PM CDT   Nurse Visit with  Pcc Nurse   [facility] ([facility] [place])    [address] [facility] [address]  4th [facility] [address] [phone]            Oct 05, 2017  1:00 PM CDT   RETURN GENERAL with Omero Srinivasan MD   Eye Clinic ([facility])    [address]  [address] [facility]  9th Fl Clin 9a  [facility] [address] [phone]            Dec 04, 2017 11:00 AM CST   (Arrive by 10:45 AM)   Implanted Defibulator with  Cv Device 1   [facility] ([facility] [place])    [address] [place] [address]  3rd [facility]  [address]   [phone]            Dec 04, 2017 11:40 AM CST   (Arrive by 11:25 AM)   RETURN ARRHYTHMIA with Deedee Baird MD   [facility] Emergency Department ([facility])    [address] [place] [address]   [phone]            Dec 04, 2017 12:30 PM CST   LAB with  LAB   [facility] Lab ([facility])    [address] [place] [id number] [phone]           Patient must bring picture ID. Patient should be prepared to give a urine specimen  Please do not eat 10-12 hours before your appointment if you are coming in fasting for labs on lipids, cholesterol, or glucose (sugar). Pregnant women should follow their Care Team instructions. Water with medications is okay. Do not drink coffee or other fluids. If you have concerns about taking  your medications, please ask at office or if scheduling via Saiguo, send a message by clicking on Secure Messaging, Message Your Care Team.            Mar 30, 2018 11:15 AM CDT   LAB with  LAB   [facility] Lab ([facility])    [address] [place] [id number] [phone]           Patient must bring picture ID. Patient should be prepared to give a urine specimen  Please do not eat 10-12 hours before your appointment if you are coming in fasting for labs on lipids, cholesterol, or glucose (sugar). Pregnant women should follow their Care Team instructions. Water with medications is okay. Do not drink coffee or other fluids. If you have concerns about taking  your medications, please ask at office or if scheduling via Saiguo, send a message by clicking on Secure Messaging, Message Your Care Team.            Mar 30, 2018 11:40 AM CDT   (Arrive by 11:25 AM)   CT CHEST/ABDOMEN/PELVIS W CONTRAST with UCCT2   [facility] [place] [facility] ([facility])    [address] [place] [address]    [phone]           Please bring any scans or X-rays taken at other hospitals, if similar tests were done. Also bring a list of your medicines, including vitamins, minerals and over-the-counter drugs. It is safest to leave personal items at home.  Be sure to tell your doctor:   If you have any allergies.   If there s any chance you are pregnant.   If you are breastfeeding.   If you have any special needs.  You may have contrast for this exam. To prepare:   Do not eat or drink for 2 hours before your exam. If you need to take medicine, you may take it with small sips of water. (We may ask you to take liquid medicine as well.)   The day before your exam, drink extra fluids at least six 8-ounce glasses (unless your doctor tells you to restrict your fluids).  Patients over 70 or patients with diabetes or kidney problems:   If you haven t had a blood test (creatinine test) within the last 30 days, go to your clinic or Diagnostic Imaging Department for this test.  If you have diabetes:   If your kidney function is normal, continue taking your metformin (Avandamet, Glucophage, Glucovance, Metaglip) on the day of your exam.   If your kidney function is abnormal, wait 48 hours before restarting this medicine.  You will have oral contrast for this exam:   You will drink the contrast at home. Get this from your clinic or Diagnostic Imaging Department. Please follow the directions given.  Please wear loose clothing, such as a sweat suit or jogging clothes. Avoid snaps, zippers and other metal. We may ask you to undress and put on a hospital gown.  If you have any questions, please call the Imaging Department where you will have your exam.            Apr 02, 2018 10:45 AM CDT   (Arrive by 10:30 AM)   Return Visit with Francisco Gilliam MD   [facility] ([facility])    [address] [facility]  2nd [facility] [address] [phone]              Future tests that were  "ordered for you today     Open Future Orders        Priority Expected Expires Ordered    CT Chest/Abdomen/Pelvis w Contrast Routine 4/3/2018 10/3/2018 10/3/2017    CBC with platelets differential Routine 4/3/2018 10/3/2018 10/3/2017    CEA Routine 4/3/2018 10/3/2018 10/3/2017    Comprehensive metabolic panel Routine 4/3/2018 10/3/2018 10/3/2017    CEA Routine 12/3/2017 2/2/2018 10/3/2017            Who to contact     If you have questions or need follow up information about today's clinic visit or your schedule please contact [facility] directly at [phone].  Normal or non-critical lab and imaging results will be communicated to you by Tianzhou Communicationhart, letter or phone within 4 business days after the clinic has received the results. If you do not hear from us within 7 days, please contact the clinic through Mersimot or phone. If you have a critical or abnormal lab result, we will notify you by phone as soon as possible.  Submit refill requests through Modern Feed or call your pharmacy and they will forward the refill request to us. Please allow 3 business days for your refill to be completed.          Additional Information About Your Visit        Modern Feed Information     Modern Feed gives you secure access to your electronic health record. If you see a primary care provider, you can also send messages to your care team and make appointments. If you have questions, please call your primary care clinic.  If you do not have a primary care provider, please call [phone] and they will assist you.        Care EveryWhere ID     This is your Care EveryWhere ID. This could be used by other organizations to access your [facility] medical records  [id number]        Your Vitals Were     Pulse Temperature Respirations Height Pulse Oximetry BMI (Body Mass Index)    78 98  F (36.7  C) (Oral) 18 1.727 m (5' 7.99\") 98% 23.21 kg/m2       Blood Pressure from Last 3 Encounters:   10/03/17 119/69   04/03/17 105/56   01/25/17 " 99/59    Weight from Last 3 Encounters:   10/03/17 69.2 kg (152 lb 9.6 oz)   04/03/17 70.7 kg (155 lb 13.8 oz)   01/25/17 68.9 kg (151 lb 12.8 oz)              We Performed the Following     DERMATOLOGY REFERRAL        Primary Care Provider Office Phone # Fax #    Roberto Sarmiento [phone]       [address] [place] [address]        Equal Access to Services     LEE CASTILLO : Hadii aad ku hadasho Soomaali, waaxda luqadaha, qaybta kaalmada adeegyada, waxay idiin hayaan adeeg kharamc lamaxine minor. So [facility] [phone].    ATENCIÓN: Si habla español, tiene a aguirre disposición servicios gratuitos de asistencia lingüística. [facility] [phone].    We comply with applicable federal civil rights laws and [place] laws. We do not discriminate on the basis of race, color, national origin, age, disability, sex, sexual orientation, or gender identity.            Thank you!     Thank you for choosing [facility]  for your care. Our goal is always to provide you with excellent care. Hearing back from our patients is one way we can continue to improve our services. Please take a few minutes to complete the written survey that you may receive in the mail after your visit with us. Thank you!             Your Updated Medication List - Protect others around you: Learn how to safely use, store and throw away your medicines at www.disposemymeds.org.          This list is accurate as of: 10/3/17 12:09 PM.  Always use your most recent med list.                   Brand Name Dispense Instructions for use Diagnosis    aspirin 81 MG tablet      Take 1 tablet by mouth daily.        atorvastatin 40 MG tablet    LIPITOR    90 tablet    Take 1 tablet (40 mg) by mouth daily    Hyperlipidemia, unspecified hyperlipidemia type       Blood Pressure Monitor Kit     1 kit    1 Units daily    Hypertension       CENTRUM SILVER per tablet      Take 1 tablet by mouth daily. AM        ciclopirox 0.77  % cream    LOPROX    90 g    Apply topically 2 times daily To feet and toenails.    Dermatophytosis of nail, Tinea pedis of both feet       cyanocobalamin 1000 MCG tablet    vitamin  B-12    180 tablet    Take 2 tablets (2,000 mcg) by mouth daily    Anemia       doxazosin 2 MG tablet    CARDURA    90 tablet    Take 1/2 tab in the morning and 1/2 tab in the evening    Essential hypertension       fluticasone 50 MCG/ACT spray    FLONASE    3 Package    Spray 2 sprays into both nostrils daily    Unspecified sinusitis (chronic)       ketoconazole 2 % cream    NIZORAL    60 g    Apply topically daily On hold    Tinea corporis       loratadine 10 MG tablet    CLARITIN     Take 10 mg by mouth as needed        metoprolol 25 MG tablet    LOPRESSOR    180 tablet    Take 1 tablet (25 mg) by mouth 2 times daily    Coronary artery disease involving native heart without angina pectoris, unspecified vessel or lesion type       mirtazapine 15 MG tablet    REMERON     Take 15 mg by mouth At Bedtime.        sulfamethoxazole-trimethoprim 400-80 MG per tablet    BACTRIM/SEPTRA    14 tablet    Take 1 tablet by mouth 2 times daily    Cellulitis, unspecified cellulitis site       triamcinolone 0.1 % cream    KENALOG    80 g    Apply topically 2 times daily For up to two weeks in a row    Atopic eczema       warfarin 5 MG tablet    COUMADIN    35 tablet    7.5 mg on Wed; 5 mg all other days  or as instructed by coumadin clinic.    Atrial flutter (H)       ZOLOFT 100 MG tablet   Generic drug:  sertraline      Take 100 mg by mouth every evening.

## 2017-10-03 NOTE — LETTER
10/3/2017       RE: Noe Florence  [address] [facility] [address]     Dear Colleague,    Thank you for referring your patient, Noe Florence, to the [facility]. Please see a copy of my visit note below.    Reason for Visit: seen in follow-up of resected stage II colon cancer.     Oncology HPI: Noe Florence is a 82 year old male with a history of stage II colon cancer s/p colectomy. He did not have adjuvant chemotherapy. Imaging in 8/2015 demonstrated a new 5 mm liver lesion in the right lobe. His CEA has been elevated and CT imaging has shown resolution of the lesion. We have been following him since then without evidence of recurrence    Interval history: Noe has been feeling fairly well. Energy and appetite are good. Weight is stable. Bowels are regular. No blood in the stool. No hematuria, but has occasional frequency. No dysuria. Urine stream is slower than it used to be. No cough, congestion, SOB, CP, palpitations. No headaches, vision changes. No dizziness, no focal weakness or neuropathy. For the past 2 years has noted some redness on the shins. Has seen a dermatologist and prescribed a cream. He hasn't found it helpful. He isn't sure of the name of cream,but it starts with A.     He is here with his wife today.      Current Outpatient Prescriptions   Medication Sig Dispense Refill     warfarin (COUMADIN) 5 MG tablet 7.5 mg on Wed; 5 mg all other days  or as instructed by coumadin clinic. 35 tablet 5     Blood Pressure Monitor KIT 1 Units daily 1 kit 0     cyanocobalamin (VITAMIN  B-12) 1000 MCG tablet Take 2 tablets (2,000 mcg) by mouth daily 180 tablet 3     sulfamethoxazole-trimethoprim (BACTRIM,SEPTRA) 400-80 MG per tablet Take 1 tablet by mouth 2 times daily 14 tablet 1     doxazosin (CARDURA) 2 MG tablet Take 1/2 tab in the morning and 1/2 tab in the evening 90 tablet 3     atorvastatin (LIPITOR) 40 MG tablet Take 1 tablet (40 mg) by mouth daily 90 tablet 3      "metoprolol (LOPRESSOR) 25 MG tablet Take 1 tablet (25 mg) by mouth 2 times daily 180 tablet 3     ciclopirox (LOPROX) 0.77 % cream Apply topically 2 times daily To feet and toenails. 90 g 6     triamcinolone (KENALOG) 0.1 % cream Apply topically 2 times daily For up to two weeks in a row 80 g 3     ketoconazole (NIZORAL) 2 % cream Apply topically daily On hold 60 g 3     loratadine (CLARITIN) 10 MG tablet Take 10 mg by mouth as needed        fluticasone (FLONASE) 50 MCG/ACT nasal spray Spray 2 sprays into both nostrils daily (Patient taking differently: Spray 2 sprays into both nostrils as needed ) 3 Package 0     Multiple Vitamins-Minerals (CENTRUM SILVER) per tablet Take 1 tablet by mouth daily. AM        aspirin 81 MG tablet Take 1 tablet by mouth daily.       mirtazapine (REMERON) 15 MG tablet Take 15 mg by mouth At Bedtime.       sertraline (ZOLOFT) 100 MG tablet Take 100 mg by mouth every evening.            Allergies   Allergen Reactions     Latex Rash     Rash         Exam: alert, appears well.  Blood pressure 119/69, pulse 78, temperature 98  F (36.7  C), temperature source Oral, resp. rate 18, height 1.727 m (5' 7.99\"), weight 69.2 kg (152 lb 9.6 oz), SpO2 98 %.  Wt Readings from Last 4 Encounters:   10/03/17 69.2 kg (152 lb 9.6 oz)   04/03/17 70.7 kg (155 lb 13.8 oz)   01/25/17 68.9 kg (151 lb 12.8 oz)   12/19/16 69.6 kg (153 lb 6.4 oz)     Oropharynx is moist, no focal lesion. No icterus. Neck supple and without adenopathy. Lungs:CTA. Heart: RRR, no murmur or rub. Abdomen: soft, nontender, BS active. No masses or organomegaly. Extremities: warm, no edema. Speech is clear. CN wnl. Gait/station wnl. No neck, supraclavicular, axillae nodes.    Labs:   Results for LEROY MACHADO (MRN [id number]) as of 10/3/2017 11:21   Ref. Range 12/18/2015 12:03 3/16/2016 11:33 9/19/2016 10:31 9/26/2016 17:08 9/26/2017 13:33   CEA Latest Ref Range: 0 - 2.5 ug/L 3.8 (H) 2.6 (H) 2.8 (H) 3.4 (H) 4.0 (H)     Results for " LEROY MACHADO (MRN [id number]) as of 10/3/2017 11:21   Ref. Range 9/26/2017 13:33   Sodium Latest Ref Range: 133 - 144 mmol/L 144   Potassium Latest Ref Range: 3.4 - 5.3 mmol/L 4.2   Chloride Latest Ref Range: 94 - 109 mmol/L 111 (H)   Carbon Dioxide Latest Ref Range: 20 - 32 mmol/L 27   Urea Nitrogen Latest Ref Range: 7 - 30 mg/dL 41 (H)   Creatinine Latest Ref Range: 0.66 - 1.25 mg/dL 1.19   GFR Estimate Latest Ref Range: >60 mL/min/1.7m2 58 (L)   GFR Estimate If Black Latest Ref Range: >60 mL/min/1.7m2 71   Calcium Latest Ref Range: 8.5 - 10.1 mg/dL 8.7   Anion Gap Latest Ref Range: 3 - 14 mmol/L 6   Albumin Latest Ref Range: 3.4 - 5.0 g/dL 3.3 (L)   Protein Total Latest Ref Range: 6.8 - 8.8 g/dL 7.5   Bilirubin Total Latest Ref Range: 0.2 - 1.3 mg/dL 0.7   Alkaline Phosphatase Latest Ref Range: 40 - 150 U/L 121   ALT Latest Ref Range: 0 - 70 U/L 17   AST Latest Ref Range: 0 - 45 U/L 15   Glucose Latest Ref Range: 70 - 99 mg/dL 91   WBC Latest Ref Range: 4.0 - 11.0 10e9/L 6.5   Hemoglobin Latest Ref Range: 13.3 - 17.7 g/dL 10.8 (L)   Hematocrit Latest Ref Range: 40.0 - 53.0 % 34.0 (L)   Platelet Count Latest Ref Range: 150 - 450 10e9/L 215   RBC Count Latest Ref Range: 4.4 - 5.9 10e12/L 3.50 (L)   MCV Latest Ref Range: 78 - 100 fl 97   MCH Latest Ref Range: 26.5 - 33.0 pg 30.9   MCHC Latest Ref Range: 31.5 - 36.5 g/dL 31.8   RDW Latest Ref Range: 10.0 - 15.0 % 14.2   Diff Method Unknown Automated Method   % Neutrophils Latest Units: % 74.0   % Lymphocytes Latest Units: % 11.3   % Monocytes Latest Units: % 10.7   % Eosinophils Latest Units: % 3.4   % Basophils Latest Units: % 0.3   % Immature Granulocytes Latest Units: % 0.3   Nucleated RBCs Latest Ref Range: 0 /100 0   Absolute Neutrophil Latest Ref Range: 1.6 - 8.3 10e9/L 4.8   Absolute Lymphocytes Latest Ref Range: 0.8 - 5.3 10e9/L 0.7 (L)   Absolute Monocytes Latest Ref Range: 0.0 - 1.3 10e9/L 0.7   Absolute Eosinophils Latest Ref Range: 0.0 - 0.7 10e9/L  0.2   Absolute Basophils Latest Ref Range: 0.0 - 0.2 10e9/L 0.0   Abs Immature Granulocytes Latest Ref Range: 0 - 0.4 10e9/L 0.0   Absolute Nucleated RBC Unknown 0.0   CEA Latest Ref Range: 0 - 2.5 ug/L 4.0 (H)     Imaging: EXAMINATION: CT CHEST/ABDOMEN/PELVIS W CONTRAST, 9/26/2017 2:02 PM     TECHNIQUE: Helical CT images from the thoracic inlet through the  symphysis pubis were obtained with intravenous contrast. Contrast  dose: Isovue-370  95cc     COMPARISON: 3/31/2017 CT chest/abdomen/pelvis     HISTORY: Malignant neoplasm of colon, unspecified     FINDINGS:     Chest: No pleural effusion or pneumothorax. No focal consolidation.  Unchanged pleural thickening along the posterior aspect of the left  lower lobe. Stable linear atelectasis versus scarring in the left  lower lobe. Mild biapical scarring. No new or enlarging pulmonary  nodule. The central tracheobronchial tree is clear. No bronchial wall  thickening or bronchiectasis.     Stable mild cardiac enlargement. No pericardial effusion. Normal  caliber ascending aorta and main pulmonary artery. Stable prominence  of the right and left pulmonary arteries. No central pulmonary  embolism. Prosthetic mitral valve. Epicardial leads course along the  anterior aspect of the heart. Dual-lead automatic implantable cardiac  defibrillator with tips in the right ventricle and right atrium.  Coronary artery stents. No lymphadenopathy in the chest. Median  sternotomy wires.     Abdomen and pelvis: The liver, gallbladder, spleen, pancreas, and  right adrenal gland are unremarkable. Unchanged hypodense nodular  thickening of the left adrenal gland, likely adenomatous change.  Stable round well-circumscribed nonenhancing hypodensity in the upper  pole of the left kidney, favored to represent a cyst. No  hydronephrosis, hydroureter, or urinary tract stone. The bladder is  decompressed. Enlarged, heterogeneous prostate with dystrophic  calcifications. Trace free fluid in the  pelvis, decreased since prior.  Stable surgical changes of partial left colectomy. Scattered colonic  diverticula. No bowel obstruction or inflammation. Normal appendix.  Small moderate hiatal hernia. The major abdominal vessels are patent.  Moderate atherosclerotic calcification of the abdominal aorta without  aneurysmal dilation. Stable saccular aneurysm of the left common iliac  artery measuring up to 2.2 cm. No retroperitoneal, mesenteric, pelvic,  or inguinal lymphadenopathy.     Bones and soft tissues: No acute or aggressive osseous lesion. Bone  island in the left femoral head. Moderate osseous degenerative change.         IMPRESSION:   1. In this patient with history of colon cancer status post sigmoid  colectomy, no evidence of recurrent or metastatic disease.  2. 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  3. Small to moderate hiatal hernia.     I have personally reviewed the examination and initial interpretation  and I agree with the findings.     EDDIE ODOM MD    Impression/plan:   1. Resected stage II colon cancer   -no evidence of recurrence on exam or imaging findings. Of some concern is the rising CEA. In the absence of other findings, will opt to check this again in a couple months. If it continues to rise, will plan on a CT in about 3 months, otherwise will see him again with a CT CAP and labs in 6 months.    2. Derm: 2 year hx of skin rash on legs. Looks like possible psoriasis on the upper shins/knees, but not on the lower shin  -he would like to see a dermatologist here to recommend treatment. He also has multiple seborrheic keratoses and would like some bothersome ones on the neck removed.  -will set up a referral at his convienience        Sincerely,    MARIA LUISA Montes CNP

## 2017-10-05 ENCOUNTER — OFFICE VISIT (OUTPATIENT)
Dept: OPHTHALMOLOGY | Facility: CLINIC | Age: 82
End: 2017-10-05
Attending: OPHTHALMOLOGY
Payer: COMMERCIAL

## 2017-10-05 DIAGNOSIS — H40.1131 PRIMARY OPEN ANGLE GLAUCOMA OF BOTH EYES, MILD STAGE: ICD-10-CM

## 2017-10-05 DIAGNOSIS — H40.1131 PRIMARY OPEN ANGLE GLAUCOMA OF BOTH EYES, MILD STAGE: Primary | ICD-10-CM

## 2017-10-05 DIAGNOSIS — Z96.1 PSEUDOPHAKIA: Primary | ICD-10-CM

## 2017-10-05 PROCEDURE — 92133 CPTRZD OPH DX IMG PST SGM ON: CPT | Mod: ZF | Performed by: OPHTHALMOLOGY

## 2017-10-05 PROCEDURE — 92083 EXTENDED VISUAL FIELD XM: CPT | Mod: ZF | Performed by: OPHTHALMOLOGY

## 2017-10-05 PROCEDURE — 92015 DETERMINE REFRACTIVE STATE: CPT | Mod: ZF

## 2017-10-05 PROCEDURE — 99214 OFFICE O/P EST MOD 30 MIN: CPT | Mod: ZF

## 2017-10-05 ASSESSMENT — SLIT LAMP EXAM - LIDS
COMMENTS: NORMAL
COMMENTS: NORMAL

## 2017-10-05 ASSESSMENT — VISUAL ACUITY
OD_SC: 20/20
OD_SC+: -1
OS_SC+: -1
OS_SC: 20/20
METHOD: SNELLEN - LINEAR

## 2017-10-05 ASSESSMENT — TONOMETRY
OD_IOP_MMHG: 11
IOP_METHOD: APPLANATION
OS_IOP_MMHG: 12

## 2017-10-05 ASSESSMENT — REFRACTION_MANIFEST
OD_AXIS: 180
OD_SPHERE: +0.25
OS_SPHERE: +0.50
OD_ADD: +2.50
OS_AXIS: 165
OD_CYLINDER: +0.50
OS_ADD: +2.50
OS_CYLINDER: +0.50

## 2017-10-05 ASSESSMENT — EXTERNAL EXAM - LEFT EYE: OS_EXAM: NORMAL

## 2017-10-05 ASSESSMENT — CUP TO DISC RATIO
OS_RATIO: 0.4
OD_RATIO: 0.6

## 2017-10-05 ASSESSMENT — CONF VISUAL FIELD
OS_NORMAL: 1
OD_NORMAL: 1

## 2017-10-05 ASSESSMENT — EXTERNAL EXAM - RIGHT EYE: OD_EXAM: NORMAL

## 2017-10-05 NOTE — NURSING NOTE
Chief Complaints and History of Present Illnesses   Patient presents with     Follow Up For     POAG     HPI    Last Eye Exam:  10/3/16   Affected eye(s):  Both   Symptoms:     No floaters   No flashes      Duration:  1 year   Frequency:  Constant       Do you have eye pain now?:  No      Comments:  Pt returns for yearly check up, concerns of health of eyes having history of Glaucoma. Pt notes that he has not used any drops for sometime. Vision is stable, denies any visual complaints or changes today. MANASA VARGAS, COA 1:49 PM 10/05/2017

## 2017-10-05 NOTE — MR AVS SNAPSHOT
After Visit Summary   10/5/2017    Noe Florence    MRN: [id number]           Patient Information     Date Of Birth          1935        Visit Information        Provider Department      10/5/2017 1:00 PM [facility]        Today's Diagnoses     Pseudophakia - Both Eyes    -  1    Primary open angle glaucoma of both eyes, mild stage           Follow-ups after your visit        Follow-up notes from your care team     Return in about 1 year (around 10/5/2018) for DFE, OCT NFL.      Your next 10 appointments already scheduled     Dec 04, 2017 11:00 AM CST   (Arrive by 10:45 AM)   Implanted Defibulator with Uc Cv Device 1   [facility] ([facility])    [address] [place] [id number] [phone]            Dec 04, 2017 11:40 AM CST   (Arrive by 11:25 AM)   RETURN ARRHYTHMIA with Deedee Baird MD   [facility] ([facility])    [address] [place] [id number] [phone]            Dec 04, 2017 12:30 PM CST   LAB with [facility]    [facility] ([facility])    [address] [place] [address] [id number] [phone]           Patient must bring picture ID. Patient should be prepared to give a urine specimen  Please do not eat 10-12 hours before your appointment if you are coming in fasting for labs on lipids, cholesterol, or glucose (sugar). Pregnant women should follow their Care Team instructions. Water with medications is okay. Do not drink coffee or other fluids. If you have concerns about taking  your medications, please ask at office or if scheduling via [employer], send a message by clicking on Secure Messaging, Message Your Care Team.            Mar 30, 2018 11:15 AM CDT   LAB with [facility]    [facility] ([facility])    [address] [place] [phone]    [phone]           Patient must bring picture ID. Patient should be prepared to give a urine specimen  Please do not eat 10-12 hours before your appointment if you are coming in fasting for labs on lipids, cholesterol, or glucose (sugar). Pregnant women should follow their Care Team instructions. Water with medications is okay. Do not drink coffee or other fluids. If you have concerns about taking  your medications, please ask at office or if scheduling via [employer], send a message by clicking on Secure Messaging, Message Your Care Team.            Mar 30, 2018 11:40 AM CDT   (Arrive by 11:25 AM)   CT CHEST/ABDOMEN/PELVIS W CONTRAST with UCCT2   [facility] [place] [facility] ([facility])    [address]  1st Floor  [facility] [address] [phone]           Please bring any scans or X-rays taken at other hospitals, if similar tests were done. Also bring a list of your medicines, including vitamins, minerals and over-the-counter drugs. It is safest to leave personal items at home.  Be sure to tell your doctor:   If you have any allergies.   If there s any chance you are pregnant.   If you are breastfeeding.   If you have any special needs.  You may have contrast for this exam. To prepare:   Do not eat or drink for 2 hours before your exam. If you need to take medicine, you may take it with small sips of water. (We may ask you to take liquid medicine as well.)   The day before your exam, drink extra fluids at least six 8-ounce glasses (unless your doctor tells you to restrict your fluids).  Patients over 70 or patients with diabetes or kidney problems:   If you haven t had a blood test (creatinine test) within the last 30 days, go to your clinic or Diagnostic Imaging Department for this test.  If you have diabetes:   If your kidney function is normal, continue taking your metformin (Avandamet, Glucophage, Glucovance, Metaglip) on the day of your exam.   If your kidney  function is abnormal, wait 48 hours before restarting this medicine.  You will have oral contrast for this exam:   You will drink the contrast at home. Get this from your clinic or Diagnostic Imaging Department. Please follow the directions given.  Please wear loose clothing, such as a sweat suit or jogging clothes. Avoid snaps, zippers and other metal. We may ask you to undress and put on a hospital gown.  If you have any questions, please call the Imaging Department where you will have your exam.            Apr 02, 2018 10:45 AM CDT   (Arrive by 10:30 AM)   Return Visit with Francisco Gilliam MD   [facility] ([facility])    [address]  2nd [facility] [address] [phone]              Who to contact     Please call your clinic at [phone] to:    Ask questions about your health    Make or cancel appointments    Discuss your medicines    Learn about your test results    Speak to your doctor   If you have compliments or concerns about an experience at your clinic, or if you wish to file a complaint, please contact [facility] Patient Relations at [phone] or email us at [email or web address]         Additional Information About Your Visit        [employer] Information     [employer] gives you secure access to your electronic health record. If you see a primary care provider, you can also send messages to your care team and make appointments. If you have questions, please call your primary care clinic.  If you do not have a primary care provider, please call [phone] and they will assist you.      [employer] is an electronic gateway that provides easy, online access to your medical records. With [employer], you can request a clinic appointment, read your test results, renew a prescription or communicate with your care team.     To access your existing account, please contact your [facility] or call [phone] for assistance.        Care EveryWhere ID     This is your Care EveryWhere ID. This could be used by other organizations to access your [facility] medical records  [id number]         Blood Pressure from Last 3 Encounters:   10/03/17 119/69   04/03/17 105/56   01/25/17 99/59    Weight from Last 3 Encounters:   10/03/17 69.2 kg (152 lb 9.6 oz)   04/03/17 70.7 kg (155 lb 13.8 oz)   01/25/17 68.9 kg (151 lb 12.8 oz)              We Performed the Following     OCT Optic Nerve RNFL Spectralis OU (both eyes)     OVF 24-2 Dynamic OU        Primary Care Provider Office Phone # Fax #    Roberto Fabiano Sarmiento [phone]       [address] [place] [phone]        Equal Access to Services     [facility] Hospital[facility] : Hadii aad ku hadasho Soomaali, waaxda luqadaha, qaybta kaalmada adeegyada, sil zamora hayalena burden . So [facility] [phone].    ATENCIÓN: Si habla español, tiene a aguirre disposición servicios gratuitos de asistencia lingüística. Llame al [phone].    We comply with applicable federal civil rights laws and [place] laws. We do not discriminate on the basis of race, color, national origin, age, disability, sex, sexual orientation, or gender identity.            Thank you!     Thank you for choosing [facility]  for your care. Our goal is always to provide you with excellent care. Hearing back from our patients is one way we can continue to improve our services. Please take a few minutes to complete the written survey that you may receive in the mail after your visit with us. Thank you!             Your Updated Medication List - Protect others around you: Learn how to safely use, store and throw away your medicines at www.disposemymeds.org.          This list is accurate as of: 10/5/17 11:59 PM.  Always use your most recent med list.                   Brand Name Dispense Instructions for use Diagnosis    aspirin 81 MG tablet      Take 1  tablet by mouth daily.        atorvastatin 40 MG tablet    LIPITOR    90 tablet    Take 1 tablet (40 mg) by mouth daily    Hyperlipidemia, unspecified hyperlipidemia type       Blood Pressure Monitor Kit     1 kit    1 Units daily    Hypertension       CENTRUM SILVER per tablet      Take 1 tablet by mouth daily. AM        ciclopirox 0.77 % cream    LOPROX    90 g    Apply topically 2 times daily To feet and toenails.    Dermatophytosis of nail, Tinea pedis of both feet       cyanocobalamin 1000 MCG tablet    vitamin  B-12    180 tablet    Take 2 tablets (2,000 mcg) by mouth daily    Anemia       doxazosin 2 MG tablet    CARDURA    90 tablet    Take 1/2 tab in the morning and 1/2 tab in the evening    Essential hypertension       fluticasone 50 MCG/ACT spray    FLONASE    3 Package    Spray 2 sprays into both nostrils daily    Unspecified sinusitis (chronic)       ketoconazole 2 % cream    NIZORAL    60 g    Apply topically daily On hold    Tinea corporis       loratadine 10 MG tablet    CLARITIN     Take 10 mg by mouth as needed        metoprolol 25 MG tablet    LOPRESSOR    180 tablet    Take 1 tablet (25 mg) by mouth 2 times daily    Coronary artery disease involving native heart without angina pectoris, unspecified vessel or lesion type       mirtazapine 15 MG tablet    REMERON     Take 15 mg by mouth At Bedtime.        sulfamethoxazole-trimethoprim 400-80 MG per tablet    BACTRIM/SEPTRA    14 tablet    Take 1 tablet by mouth 2 times daily    Cellulitis, unspecified cellulitis site       triamcinolone 0.1 % cream    KENALOG    80 g    Apply topically 2 times daily For up to two weeks in a row    Atopic eczema       warfarin 5 MG tablet    COUMADIN    35 tablet    7.5 mg on Wed; 5 mg all other days  or as instructed by coumadin clinic.    Atrial flutter (H)       ZOLOFT 100 MG tablet   Generic drug:  sertraline      Take 100 mg by mouth every evening.

## 2017-10-06 NOTE — PROGRESS NOTES
Assessment & Plan      Noe Florence is a 80 year old male with the following diagnoses:   1. Pseudophakia - Both Eyes    2. Primary open angle glaucoma of both eyes, mild stage           Doing well  Intraocular pressure excellent S/P istent both eyes   Visual fields  And OCT overall stable  Continue to monitor yearly    Assessment & Plan      Noe Florence is a 82 year old male with the following diagnoses:   1. Pseudophakia - Both Eyes    2. Primary open angle glaucoma of both eyes, mild stage           Continue to monitor without drops    Patient disposition:   Return in about 1 year (around 10/5/2018) for DFE, OCT NFL.          Attending Physician Attestation:  Complete documentation of historical and exam elements from today's encounter can be found in the full encounter summary report (not reduplicated in this progress note).  I personally obtained the chief complaint(s) and history of present illness.  I confirmed and edited as necessary the review of systems, past medical/surgical history, family history, social history, and examination findings as documented by others; and I examined the patient myself.  I personally reviewed the relevant tests, images, and reports as documented above.  I formulated and edited as necessary the assessment and plan and discussed the findings and management plan with the patient and family.Attending Physician Image/Tesing Attestation: I personally reviewed the ophthalmic test(s) associated with this encounter, agree with the interpretation(s) as documented by the resident/fellow, and have edited the corresponding report(s) as necessary.   . - Omero Srinivasan MD

## 2017-10-17 DIAGNOSIS — Z79.01 LONG-TERM (CURRENT) USE OF ANTICOAGULANTS: ICD-10-CM

## 2017-10-17 DIAGNOSIS — I48.91 ATRIAL FIBRILLATION, UNSPECIFIED TYPE (H): ICD-10-CM

## 2017-10-17 LAB — INR PPP: 2.28 (ref 0.86–1.14)

## 2017-10-18 ENCOUNTER — ANTICOAGULATION THERAPY VISIT (OUTPATIENT)
Dept: ANTICOAGULATION | Facility: CLINIC | Age: 82
End: 2017-10-18

## 2017-10-18 DIAGNOSIS — Z79.01 LONG-TERM (CURRENT) USE OF ANTICOAGULANTS: ICD-10-CM

## 2017-10-18 DIAGNOSIS — I48.91 ATRIAL FIBRILLATION, UNSPECIFIED TYPE (H): ICD-10-CM

## 2017-10-18 NOTE — PROGRESS NOTES
ANTICOAGULATION FOLLOW-UP CLINIC VISIT    Patient Name:  Noe Florence  Date:  10/18/2017  Contact Type:  Telephone    SUBJECTIVE:        OBJECTIVE    INR   Date Value Ref Range Status   10/17/2017 2.28 (H) 0.86 - 1.14 Final       ASSESSMENT / PLAN  INR assessment THER    Recheck INR In: 4 WEEKS    INR Location Clinic      Anticoagulation Summary as of 10/18/2017     INR goal 2.0-3.0   Today's INR 2.28 (10/17/2017)   Maintenance plan 5 mg (5 mg x 1) every day   Full instructions 5 mg every day   Weekly total 35 mg   Plan last modified Roya Manning RN (8/30/2017)   Next INR check 11/15/2017   Priority INR   Target end date Indefinite    Indications   Atrial fibrillation (H) [I48.91] [I48.91]  Long-term (current) use of anticoagulants [Z79.01] [Z79.01]         Anticoagulation Episode Summary     INR check location     Preferred lab     Send INR reminders to [facility]    Comments Patient contact number: [phone]   Can leave results with Rica (friend)      Anticoagulation Care Providers     Provider Role Specialty Phone number    Deedee Baird MD Responsible Cardiology [phone]            See the Encounter Report to view Anticoagulation Flowsheet and Dosing Calendar (Go to Encounters tab in chart review, and find the Anticoagulation Therapy Visit)    Left message for patient with results and dosing recommendations. Asked patient to call back to report any missed doses, falls, signs and symptoms of bleeding or clotting, any changes in health, medication, or diet. Asked patient to call back with any questions or concerns.     Nguyen Zuniga, BYRON

## 2017-10-18 NOTE — MR AVS SNAPSHOT
Noe Florence   10/18/2017   Anticoagulation Therapy Visit    Description:  82 year old male   Provider:  Nguyen Zuniga, RN   Department:  [facility]           INR as of 10/18/2017     Today's INR 2.28 (10/17/2017)      Anticoagulation Summary as of 10/18/2017     INR goal 2.0-3.0   Today's INR 2.28 (10/17/2017)   Full instructions 5 mg every day   Next INR check 11/15/2017    Indications   Atrial fibrillation (H) [I48.91] [I48.91]  Long-term (current) use of anticoagulants [Z79.01] [Z79.01]         October 2017 Details    Sun Mon Tue Wed Thu Fri Sat     1               2               3               4               5               6               7                 8               9               10               11               12               13               14                 15               16               17               18      5 mg   See details      19      5 mg         20      5 mg         21      5 mg           22      5 mg         23      5 mg         24      5 mg         25      5 mg         26      5 mg         27      5 mg         28      5 mg           29      5 mg         30      5 mg         31      5 mg              Date Details   10/18 This INR check               How to take your warfarin dose     To take:  5 mg Take 1 of the 5 mg tablets.           November 2017 Details    Sun Mon Tue Wed Thu Fri Sat        1      5 mg         2      5 mg         3      5 mg         4      5 mg           5      5 mg         6      5 mg         7      5 mg         8      5 mg         9      5 mg         10      5 mg         11      5 mg           12      5 mg         13      5 mg         14      5 mg         15            16               17               18                 19               20               21               22               23               24               25                 26               27               28               29               30                  Date  Details   No additional details    Date of next INR:  11/15/2017         How to take your warfarin dose     To take:  5 mg Take 1 of the 5 mg tablets.

## 2017-11-04 ENCOUNTER — HOSPITAL ENCOUNTER (OUTPATIENT)
Facility: CLINIC | Age: 82
Setting detail: OBSERVATION
Discharge: HOME | End: 2017-11-05
Attending: FAMILY MEDICINE | Admitting: EMERGENCY MEDICINE
Payer: COMMERCIAL

## 2017-11-04 ENCOUNTER — APPOINTMENT (OUTPATIENT)
Dept: GENERAL RADIOLOGY | Facility: CLINIC | Age: 82
End: 2017-11-04
Attending: FAMILY MEDICINE
Payer: COMMERCIAL

## 2017-11-04 DIAGNOSIS — L03.116 LEFT LEG CELLULITIS: ICD-10-CM

## 2017-11-04 DIAGNOSIS — Z79.01 LONG-TERM (CURRENT) USE OF ANTICOAGULANTS: Primary | ICD-10-CM

## 2017-11-04 PROBLEM — L08.9 SKIN INFECTION: Status: ACTIVE | Noted: 2017-11-04

## 2017-11-04 LAB
ALBUMIN SERPL-MCNC: 3.2 G/DL (ref 3.4–5)
ALP SERPL-CCNC: 102 U/L (ref 40–150)
ALT SERPL W P-5'-P-CCNC: 20 U/L (ref 0–70)
ANION GAP SERPL CALCULATED.3IONS-SCNC: 4 MMOL/L (ref 3–14)
APTT PPP: 31 SEC (ref 22–37)
AST SERPL W P-5'-P-CCNC: 12 U/L (ref 0–45)
BASOPHILS # BLD AUTO: 0 10E9/L (ref 0–0.2)
BASOPHILS NFR BLD AUTO: 0.3 %
BILIRUB SERPL-MCNC: 0.3 MG/DL (ref 0.2–1.3)
BUN SERPL-MCNC: 35 MG/DL (ref 7–30)
CALCIUM SERPL-MCNC: 8.9 MG/DL (ref 8.5–10.1)
CHLORIDE SERPL-SCNC: 112 MMOL/L (ref 94–109)
CO2 SERPL-SCNC: 27 MMOL/L (ref 20–32)
CREAT SERPL-MCNC: 1.24 MG/DL (ref 0.66–1.25)
CRP SERPL-MCNC: 3.4 MG/L (ref 0–8)
DIFFERENTIAL METHOD BLD: ABNORMAL
EOSINOPHIL # BLD AUTO: 0.2 10E9/L (ref 0–0.7)
EOSINOPHIL NFR BLD AUTO: 2.9 %
ERYTHROCYTE [DISTWIDTH] IN BLOOD BY AUTOMATED COUNT: 14.3 % (ref 10–15)
GFR SERPL CREATININE-BSD FRML MDRD: 56 ML/MIN/1.7M2
GLUCOSE SERPL-MCNC: 116 MG/DL (ref 70–99)
GRAM STN SPEC: ABNORMAL
HCT VFR BLD AUTO: 33.5 % (ref 40–53)
HGB BLD-MCNC: 10.5 G/DL (ref 13.3–17.7)
IMM GRANULOCYTES # BLD: 0 10E9/L (ref 0–0.4)
IMM GRANULOCYTES NFR BLD: 0.2 %
INR PPP: 2.89 (ref 0.86–1.14)
LYMPHOCYTES # BLD AUTO: 1 10E9/L (ref 0.8–5.3)
LYMPHOCYTES NFR BLD AUTO: 14.8 %
Lab: ABNORMAL
MCH RBC QN AUTO: 30.5 PG (ref 26.5–33)
MCHC RBC AUTO-ENTMCNC: 31.3 G/DL (ref 31.5–36.5)
MCV RBC AUTO: 97 FL (ref 78–100)
MONOCYTES # BLD AUTO: 0.7 10E9/L (ref 0–1.3)
MONOCYTES NFR BLD AUTO: 9.9 %
NEUTROPHILS # BLD AUTO: 4.8 10E9/L (ref 1.6–8.3)
NEUTROPHILS NFR BLD AUTO: 71.9 %
NRBC # BLD AUTO: 0 10*3/UL
NRBC BLD AUTO-RTO: 0 /100
PLATELET # BLD AUTO: 192 10E9/L (ref 150–450)
POTASSIUM SERPL-SCNC: 4.5 MMOL/L (ref 3.4–5.3)
PROT SERPL-MCNC: 7 G/DL (ref 6.8–8.8)
RBC # BLD AUTO: 3.44 10E12/L (ref 4.4–5.9)
SODIUM SERPL-SCNC: 144 MMOL/L (ref 133–144)
SPECIMEN SOURCE: ABNORMAL
WBC # BLD AUTO: 6.6 10E9/L (ref 4–11)

## 2017-11-04 PROCEDURE — 87077 CULTURE AEROBIC IDENTIFY: CPT | Performed by: FAMILY MEDICINE

## 2017-11-04 PROCEDURE — 96375 TX/PRO/DX INJ NEW DRUG ADDON: CPT | Performed by: FAMILY MEDICINE

## 2017-11-04 PROCEDURE — 73590 X-RAY EXAM OF LOWER LEG: CPT | Mod: LT

## 2017-11-04 PROCEDURE — 25000125 ZZHC RX 250: Performed by: NURSE PRACTITIONER

## 2017-11-04 PROCEDURE — 87070 CULTURE OTHR SPECIMN AEROBIC: CPT | Performed by: FAMILY MEDICINE

## 2017-11-04 PROCEDURE — 85730 THROMBOPLASTIN TIME PARTIAL: CPT | Performed by: FAMILY MEDICINE

## 2017-11-04 PROCEDURE — G0378 HOSPITAL OBSERVATION PER HR: HCPCS

## 2017-11-04 PROCEDURE — 99219 ZZC INITIAL OBSERVATION CARE,LEVL II: CPT | Mod: Z6 | Performed by: EMERGENCY MEDICINE

## 2017-11-04 PROCEDURE — 86140 C-REACTIVE PROTEIN: CPT | Performed by: FAMILY MEDICINE

## 2017-11-04 PROCEDURE — 96365 THER/PROPH/DIAG IV INF INIT: CPT

## 2017-11-04 PROCEDURE — 85025 COMPLETE CBC W/AUTO DIFF WBC: CPT | Performed by: FAMILY MEDICINE

## 2017-11-04 PROCEDURE — 99285 EMERGENCY DEPT VISIT HI MDM: CPT | Mod: 25 | Performed by: FAMILY MEDICINE

## 2017-11-04 PROCEDURE — 25000132 ZZH RX MED GY IP 250 OP 250 PS 637: Performed by: NURSE PRACTITIONER

## 2017-11-04 PROCEDURE — 85610 PROTHROMBIN TIME: CPT | Performed by: FAMILY MEDICINE

## 2017-11-04 PROCEDURE — 25000128 H RX IP 250 OP 636: Performed by: FAMILY MEDICINE

## 2017-11-04 PROCEDURE — 87040 BLOOD CULTURE FOR BACTERIA: CPT | Performed by: FAMILY MEDICINE

## 2017-11-04 PROCEDURE — 25000128 H RX IP 250 OP 636: Performed by: EMERGENCY MEDICINE

## 2017-11-04 PROCEDURE — 96365 THER/PROPH/DIAG IV INF INIT: CPT | Performed by: FAMILY MEDICINE

## 2017-11-04 PROCEDURE — 87205 SMEAR GRAM STAIN: CPT | Performed by: FAMILY MEDICINE

## 2017-11-04 PROCEDURE — 87186 SC STD MICRODIL/AGAR DIL: CPT | Performed by: FAMILY MEDICINE

## 2017-11-04 PROCEDURE — 80053 COMPREHEN METABOLIC PANEL: CPT | Performed by: FAMILY MEDICINE

## 2017-11-04 RX ORDER — GINSENG 100 MG
CAPSULE ORAL 2 TIMES DAILY
Status: DISCONTINUED | OUTPATIENT
Start: 2017-11-04 | End: 2017-11-05 | Stop reason: HOSPADM

## 2017-11-04 RX ORDER — ONDANSETRON 4 MG/1
4 TABLET, ORALLY DISINTEGRATING ORAL EVERY 6 HOURS PRN
Status: DISCONTINUED | OUTPATIENT
Start: 2017-11-04 | End: 2017-11-05 | Stop reason: HOSPADM

## 2017-11-04 RX ORDER — DOXAZOSIN 1 MG/1
1 TABLET ORAL EVERY 12 HOURS SCHEDULED
Status: DISCONTINUED | OUTPATIENT
Start: 2017-11-04 | End: 2017-11-05 | Stop reason: HOSPADM

## 2017-11-04 RX ORDER — ATORVASTATIN CALCIUM 40 MG/1
40 TABLET, FILM COATED ORAL DAILY
Status: DISCONTINUED | OUTPATIENT
Start: 2017-11-04 | End: 2017-11-05 | Stop reason: HOSPADM

## 2017-11-04 RX ORDER — VANCOMYCIN HYDROCHLORIDE 1 G/200ML
1000 INJECTION, SOLUTION INTRAVENOUS EVERY 24 HOURS
Status: DISCONTINUED | OUTPATIENT
Start: 2017-11-04 | End: 2017-11-05 | Stop reason: HOSPADM

## 2017-11-04 RX ORDER — LIDOCAINE 40 MG/G
CREAM TOPICAL
Status: DISCONTINUED | OUTPATIENT
Start: 2017-11-04 | End: 2017-11-05 | Stop reason: HOSPADM

## 2017-11-04 RX ORDER — ONDANSETRON 2 MG/ML
4 INJECTION INTRAMUSCULAR; INTRAVENOUS EVERY 6 HOURS PRN
Status: DISCONTINUED | OUTPATIENT
Start: 2017-11-04 | End: 2017-11-05 | Stop reason: HOSPADM

## 2017-11-04 RX ORDER — AMPICILLIN AND SULBACTAM 2; 1 G/1; G/1
3 INJECTION, POWDER, FOR SOLUTION INTRAMUSCULAR; INTRAVENOUS ONCE
Status: COMPLETED | OUTPATIENT
Start: 2017-11-04 | End: 2017-11-04

## 2017-11-04 RX ORDER — NALOXONE HYDROCHLORIDE 0.4 MG/ML
.1-.4 INJECTION, SOLUTION INTRAMUSCULAR; INTRAVENOUS; SUBCUTANEOUS
Status: DISCONTINUED | OUTPATIENT
Start: 2017-11-04 | End: 2017-11-05 | Stop reason: HOSPADM

## 2017-11-04 RX ORDER — ACETAMINOPHEN 325 MG/1
650 TABLET ORAL EVERY 4 HOURS PRN
Status: DISCONTINUED | OUTPATIENT
Start: 2017-11-04 | End: 2017-11-05 | Stop reason: HOSPADM

## 2017-11-04 RX ORDER — MIRTAZAPINE 15 MG/1
15 TABLET, FILM COATED ORAL AT BEDTIME
Status: DISCONTINUED | OUTPATIENT
Start: 2017-11-04 | End: 2017-11-05 | Stop reason: HOSPADM

## 2017-11-04 RX ORDER — METOPROLOL TARTRATE 25 MG/1
25 TABLET, FILM COATED ORAL 2 TIMES DAILY
Status: DISCONTINUED | OUTPATIENT
Start: 2017-11-04 | End: 2017-11-05 | Stop reason: HOSPADM

## 2017-11-04 RX ORDER — LIDOCAINE HYDROCHLORIDE 10 MG/ML
1 INJECTION, SOLUTION EPIDURAL; INFILTRATION; INTRACAUDAL; PERINEURAL ONCE
Status: DISCONTINUED | OUTPATIENT
Start: 2017-11-04 | End: 2017-11-05 | Stop reason: HOSPADM

## 2017-11-04 RX ORDER — ASPIRIN 81 MG/1
81 TABLET, CHEWABLE ORAL DAILY
Status: DISCONTINUED | OUTPATIENT
Start: 2017-11-05 | End: 2017-11-05 | Stop reason: HOSPADM

## 2017-11-04 RX ADMIN — MIRTAZAPINE 15 MG: 15 TABLET, FILM COATED ORAL at 22:42

## 2017-11-04 RX ADMIN — VANCOMYCIN HYDROCHLORIDE 1000 MG: 1 INJECTION, SOLUTION INTRAVENOUS at 22:41

## 2017-11-04 RX ADMIN — SERTRALINE HYDROCHLORIDE 100 MG: 50 TABLET ORAL at 21:24

## 2017-11-04 RX ADMIN — ATORVASTATIN CALCIUM 40 MG: 40 TABLET, FILM COATED ORAL at 21:22

## 2017-11-04 RX ADMIN — BACITRACIN: 500 OINTMENT TOPICAL at 22:42

## 2017-11-04 RX ADMIN — AMPICILLIN SODIUM AND SULBACTAM SODIUM 3 G: 2; 1 INJECTION, POWDER, FOR SOLUTION INTRAMUSCULAR; INTRAVENOUS at 16:11

## 2017-11-04 RX ADMIN — DOXAZOSIN 1 MG: 1 TABLET ORAL at 22:40

## 2017-11-04 ASSESSMENT — ENCOUNTER SYMPTOMS
ARTHRALGIAS: 0
ABDOMINAL PAIN: 0
CONFUSION: 0
EYE REDNESS: 0
CHILLS: 0
FEVER: 0
HEADACHES: 0
DIFFICULTY URINATING: 0
SHORTNESS OF BREATH: 0
COLOR CHANGE: 1
NECK STIFFNESS: 0
WOUND: 1
COUGH: 0

## 2017-11-04 ASSESSMENT — ACTIVITIES OF DAILY LIVING (ADL)
NUMBER_OF_TIMES_PATIENT_HAS_FALLEN_WITHIN_LAST_SIX_MONTHS: 2
AMBULATION: 0-->INDEPENDENT
FALL_HISTORY_WITHIN_LAST_SIX_MONTHS: YES
TOILETING: 0-->INDEPENDENT
RETIRED_EATING: 0-->INDEPENDENT
SWALLOWING: 0-->SWALLOWS FOODS/LIQUIDS WITHOUT DIFFICULTY
DRESS: 0-->INDEPENDENT
COGNITION: 0 - NO COGNITION ISSUES REPORTED
TRANSFERRING: 0-->INDEPENDENT
RETIRED_COMMUNICATION: 0-->UNDERSTANDS/COMMUNICATES WITHOUT DIFFICULTY
BATHING: 0-->INDEPENDENT

## 2017-11-04 NOTE — PHARMACY-ANTICOAGULATION SERVICE
Clinical Pharmacy - Warfarin Dosing Consult     Pharmacy has been consulted to manage this patient s warfarin therapy.  Indication: DVT/PE Prophylaxis, clinic note says AFib  Therapy Goal: INR 2-3  Warfarin Prior to Admission: Yes  Warfarin PTA Regimen: 5mg QD per clinic note 10/17/17  Recent documented change in oral intake/nutrition: Unknown    INR   Date Value Ref Range Status   11/04/2017 2.89 (H) 0.86 - 1.14 Final   10/17/2017 2.28 (H) 0.86 - 1.14 Final       Recommend warfarin no dose today- patient says he took it in the AM on 11/4.  Pharmacy will monitor Noe Florence daily and order warfarin doses to achieve specified goal.      Please contact pharmacy as soon as possible if the warfarin needs to be held for a procedure or if the warfarin goals change.

## 2017-11-04 NOTE — IP AVS SNAPSHOT
[address] Observation [address] [address]    Phone:  [phone]    Fax:  [phone]                                       After Visit Summary   11/4/2017    Noe Florence    MRN: [id number]           After Visit Summary Signature Page     I have received my discharge instructions, and my questions have been answered. I have discussed any challenges I see with this plan with the nurse or doctor.    ..........................................................................................................................................  Patient/Patient Representative Signature      ..........................................................................................................................................  Patient Representative Print Name and Relationship to Patient    ..................................................               ................................................  Date                                            Time    ..........................................................................................................................................  Reviewed by Signature/Title    ...................................................              ..............................................  Date                                                            Time

## 2017-11-04 NOTE — ED NOTES
Presents to ED for left lower leg wound. First noticed the wound a few days ago. Both lower legs with mild erythema. Edema to left lower leg. Drainage from dressing on left leg. Able to ambulate. Patient states he takes warfarin for atrial fibrillation.

## 2017-11-04 NOTE — H&P
"[facility]   History & Physical    Noe Florence MRN# [id number]   Age: 82 year old YOB: 1935     Date of Admission: 11/4/2017    Primary Care Provider: Roberto Sarmiento         Chief Complaint:   \"leg swelling and infection\"         History of Present Illness:   Noe Florence is a 82 year old male with PMH significant for DVT on coumadin, polymyalgia rheumatica, NSVT s/p PPM/AICD, CAD (s/p CABG X 2, JEREMY X 2), atrial flutter, nonsenile cataract, ischemic cardiomyopathy, hypertension, hyperlipidemia, colon cancer, SKD stage III, and arthritis who presented to the ER with complaints of left leg swelling and infection.  The patient reports that a few days he noted what appeared to be a large blister to his left shin. This wound opened up and since then he has been having drainage and development of redness and swelling in the area, concern for infection. The patient states that he's had no falls or injuries to the leg recently that he recalls. He denies any pain of the leg. He also denies any fevers or chills. No chest pain, shortness of breath, or cough.  In the ED the patient's vital signs were stable, he was afebrile. Labs: BMP unremarkable.  CRP 3.4.  Glucose 116.  CBC with no leukocytosis.  hgb 10.5.  INR 2.89, blood culture X 1 done.  Wound culture and gram stain sent.  XR of tibia and fibula showed no acute abnormalities.  He was given Unasyn and Vancomycin and subsequently admitted to the observation unit.             Review of Systems:     All others reviewed and are negative         Past Medical History:     Past Medical History:   Diagnosis Date     Advanced open-angle glaucoma      Anemia     post-op colectomy     Antiplatelet or antithrombotic long-term use      Arrhythmia      Arthritis     hands     Atrial fibrillation (H)      CKD (chronic kidney disease) stage 3, GFR 30-59 ml/min      CKD (chronic kidney disease), stage III 2005     " Colon cancer (H)     Stage II-B colon cancer     Coronary artery disease     s/p CABG x 2, JEREMY x 2     Diverticulitis      History of blood transfusion      Hyperlipidemia      Hypertension      Ischemic cardiomyopathy      MGUS (monoclonal gammopathy of unknown significance)      Nonsenile cataract     BE     Pacemaker      Polymorphic ventricular tachycardia (H)      Polymyalgia rheumatica (H)      PVD (posterior vitreous detachment), both eyes 2005     S/P ablation of atrial flutter 6/20/14    CTI     Stented coronary artery      SVT (supraventricular tachycardia) (H)     PPM/AICD for NSVT     Thrombosis of leg     Left leg     Upper leg DVT (deep venous thromboembolism), chronic (H)     Left     Weight loss, unintentional 2017    15 lb in 4 months             Past Surgical History:      Past Surgical History:   Procedure Laterality Date     C CABG, ARTERY-VEIN, FOUR  2006    LIMA-LAD, SVG-Rt PDA, SVG-OM2, SVG-Diag 1     C CABG, VEIN, SINGLE  1994    1-vessel CABG, SVG->PDRCA      CATARACT IOL, RT/LT Bilateral      COLONOSCOPY  3/13/2014    Procedure: COMBINED COLONOSCOPY, SINGLE BIOPSY/POLYPECTOMY BY BIOPSY;;  Surgeon: Mary Gerber MD;  Location:  GI     COLONOSCOPY N/A 6/22/2015    Procedure: COLONOSCOPY;  Surgeon: Marilin Newman MD;  Location:  GI     H ABLATION ATRIAL FLUTTER       HEART CATH DRUG ELUTING STENT PLACEMENT  4/19/2012    JEREMY x 2 to LCx     IMPLANT IMPLANTABLE CARDIOVERTER DEFIBRILLATOR  5-    ppm/aicd     KNEE SURGERY      right and left knee surgeries     LAPAROSCOPIC ASSISTED COLECTOMY LEFT (DESCENDING)  4/8/2014    Procedure: LAPAROSCOPIC ASSISTED COLECTOMY LEFT (DESCENDING);  Hand assisted Laparoscopic Sigmoid Colectomy , Laparoscopic mobilization of spleenic flexure *Latex Allergy*Anesthesia General with Block;  Surgeon: Chanelle Guzmán MD;  Location:  OR     REPAIR VALVE MITRAL  2006     30-mm Medtronic Julian ring      SELECTIVE LASER  TRABECULOPLASTY (SLT) OD (RIGHT EYE)  4/10, 1/12,+1/9/13    RE     SELECTIVE LASER TRABECULOPLASTY (SLT) OS (LEFT EYE)  5/2012     SHOULDER SURGERY      right rotator cuff             Family History:     Family History   Problem Relation Age of Onset     C.A.D. Father      Anesthesia Reaction No family hx of      Crohn Disease No family hx of      Ulcerative Colitis No family hx of      Cancer - colorectal No family hx of      Macular Degeneration No family hx of      CANCER No family hx of      No known family hx of skin cancer             Social History:     Social History   Substance Use Topics     Smoking status: Former Smoker     Types: Cigarettes, Cigars     Quit date: 1/1/1968     Smokeless tobacco: Never Used     Alcohol use 0.0 oz/week     0 Standard drinks or equivalent per week      Comment: 2-3 drinks twice weekly             Medications:     Current Facility-Administered Medications on File Prior to Encounter:  glycopyrrolate (ROBINUL) injection   neostigmine (PROSTIGMINE) injection     Current Outpatient Prescriptions on File Prior to Encounter:  warfarin (COUMADIN) 5 MG tablet 7.5 mg on Wed; 5 mg all other days  or as instructed by coumadin clinic.   Blood Pressure Monitor KIT 1 Units daily   cyanocobalamin (VITAMIN  B-12) 1000 MCG tablet Take 2 tablets (2,000 mcg) by mouth daily   sulfamethoxazole-trimethoprim (BACTRIM,SEPTRA) 400-80 MG per tablet Take 1 tablet by mouth 2 times daily   doxazosin (CARDURA) 2 MG tablet Take 1/2 tab in the morning and 1/2 tab in the evening   atorvastatin (LIPITOR) 40 MG tablet Take 1 tablet (40 mg) by mouth daily   metoprolol (LOPRESSOR) 25 MG tablet Take 1 tablet (25 mg) by mouth 2 times daily   ciclopirox (LOPROX) 0.77 % cream Apply topically 2 times daily To feet and toenails. (Patient not taking: Reported on 10/3/2017)   triamcinolone (KENALOG) 0.1 % cream Apply topically 2 times daily For up to two weeks in a row (Patient not taking: Reported on 10/3/2017)  "  ketoconazole (NIZORAL) 2 % cream Apply topically daily On hold (Patient not taking: Reported on 10/3/2017)   loratadine (CLARITIN) 10 MG tablet Take 10 mg by mouth as needed    fluticasone (FLONASE) 50 MCG/ACT nasal spray Spray 2 sprays into both nostrils daily (Patient not taking: Reported on 10/3/2017)   Multiple Vitamins-Minerals (CENTRUM SILVER) per tablet Take 1 tablet by mouth daily. AM    aspirin 81 MG tablet Take 1 tablet by mouth daily.   mirtazapine (REMERON) 15 MG tablet Take 15 mg by mouth At Bedtime.   sertraline (ZOLOFT) 100 MG tablet Take 100 mg by mouth every evening.            Allergies:     Allergies   Allergen Reactions     Latex Rash     Rash             Physical Exam:   /72  Temp 97.7  F (36.5  C) (Oral)  Resp 17  Ht 1.702 m (5' 7\")  SpO2 100%   GENERAL: Alert and oriented x 3. NAD.   HEENT: Anicteric sclera. PERRL. Mucous membranes moist and without lesions.   CV: RRR. S1, S2. No murmurs appreciated.   RESPIRATORY: Effort normal. Lungs CTAB with no wheezing, rales, rhonchi.   GI: Abdomen soft and non distended with normoactive bowel sounds present in all quadrants. No tenderness, rebound, guarding.   MUSCULOSKELETAL: No joint swelling or tenderness. Moves all extremities.   NEUROLOGICAL: No focal deficits. Strength 5/5 bilaterally in upper and lower extremities.   EXTREMITIES: No peripheral edema. Intact bilateral pedal pulses.   SKIN: No jaundice. No rashes. Bilateral venous stasis skin changes, LLE with what appears to be popped blister with surrounding erythema.         Labs:   CBC:  Recent Labs   Lab Test  11/04/17   1226   WBC  6.6   RBC  3.44*   HGB  10.5*   HCT  33.5*   MCV  97   MCH  30.5   MCHC  31.3*   RDW  14.3   PLT  192       CMP:  Recent Labs   Lab Test  11/04/17   1226   NA  144   POTASSIUM  4.5   CHLORIDE  112*   KEITH  8.9   CO2  27   BUN  35*   CR  1.24   GLC  116*   AST  12   ALT  20   BILITOTAL  0.3   ALBUMIN  3.2*   PROTTOTAL  7.0   ALKPHOS  102       INR: "   Recent Labs   Lab Test  11/04/17   1226   INR  2.89*            Imaging:   XR TIBIA & FIBULA LT 2 VW 11/4/2017 1:00 PM     History: left leg wound     Comparison: 6/17/2014.     Findings: Extensive vascular calcifications. No fracture. No bony  change to suggest osteomyelitis. Bandage and soft tissue defect over  the anterior shin without evidence for underlying bony abnormality.  Osteoarthritis in the knee.         Impression: No acute bony abnormality. Extensive vascular  calcifications.     YOLANDA BIRD         Assessment and Plan:   Noe Florence is a 82 year old male with PMH significant for DVT on coumadin, polymyalgia rheumatica, NSVT s/p PPM/AICD, CAD (s/p CABG X 2, JEREMY X 2), atrial flutter, nonsenile cataract, ischemic cardiomyopathy, hypertension, hyperlipidemia, colon cancer, SKD stage III, and arthritis who presented to the ER with complaints of left leg swelling and infection.     1. Left lower extremity cellulitis:  VSS, afebrile, CRP not elevated and no leukocytosis.  Blood culture and wound culture pending.  XR negative for acute abnormalities.    -admit to the observation unit  -continue IV vancomycin and unasyn  -transition to oral bactrim and keflex in am if improving  -repeat CBC, BMP, CRP in am  -likely d/c in am, follow up with PCP in one week    Chronic problems:  #CAD (s/p CABG X 2: 1996, 2006, JEREMY X 2: 2012), hyperlipidemia, hypertension:  Follows with Dr. Kenyon.  Continue PTA doxazosin, atorvastatin, metoprolol  #DVT on coumadin: INR therapeutic. -pharmacy consult for coumadin dosing (takes coumadin in am), daily INR  #Stage 2 colon cancer: s/p colectomy and adjunctive chemotherapy, follows with hem[facility]onc at [facility].  No evidence of recurrence.  #CKD stage 3: creatinine at baseline 1.24.  -repeat BMP in am given vancomycin use  #NSVT, afib/flutter s/p ICD placement, ablation:  Continue PTA metoprolol, follows with Dr. Baird  Depression: continue PTA sertraline  Anemia: Hgb at baseline  10.5      Discussed with Dr. Cox.     FEN: cardiac  Prophylaxis: coumadin  Code Status: Full        Barbara Posey, APRN, CNP  [facility] #[id number]

## 2017-11-04 NOTE — IP AVS SNAPSHOT
MRN:[id number]                      After Visit Summary   11/4/2017    Noe Florence    MRN: [id number]           Thank you!     Thank you for choosing [facility] for your care. Our goal is always to provide you with excellent care. Hearing back from our patients is one way we can continue to improve our services. Please take a few minutes to complete the written survey that you may receive in the mail after you visit with us. Thank you!        Patient Information     Date Of Birth          1935        Designated Caregiver       Most Recent Value    Caregiver    Will someone help with your care after discharge? yes    Name of designated caregiver Rica Levine    Phone number of caregiver [phone]    Caregiver address [address] [place] [id number]      About your hospital stay     You were admitted on:  November 4, 2017 You last received care in the:  Unit 6D Observation [facility]    You were discharged on:  November 5, 2017        Reason for your hospital stay       Left leg infection                  Who to Call     For medical emergencies, please call 911.  For non-urgent questions about your medical care, please call your primary care provider or clinic, [phone]          Attending Provider     Provider Specialty    Ernst Bledsoe MD Emergency Medicine    [facility] Emergency Medicine       Primary Care Provider Office Phone # Fax #    Roberto Sarmiento [phone]       When to contact your care team       Return to the ER if you have worsening pain, redness, swelling, or fever                  After Care Instructions     Activity       Your activity upon discharge: activity as tolerated, limit movement of left leg for the next week            Diet       Follow this diet upon discharge: Orders Placed This Encounter      Low Saturated Fat Na <2400 mg            Wound care and dressings       Instructions to care for your wound  at home: apply bacitracin ointment to open wound twice per day, then cover with non adherent guaze and wrap loosely with kerlix                  Follow-up Appointments     Adult [facility] Follow-up and recommended labs and tests       Follow up with primary care provider, Roberto Sarmiento, within 7 days for hospital follow- up.  Please have your INR drawn on Monday  Appointments on [place] and/or [facility] (with [facility] or [facility] provider or service). Call [phone] if you haven't heard regarding these appointments within 7 days of discharge.                  Your next 10 appointments already scheduled     Dec 04, 2017 11:00 AM CST   (Arrive by 10:45 AM)   Implanted Defibulator with  Cv Device 1   [facility] ([facility])    [address] [place] [id number]   [phone]            Dec 04, 2017 11:40 AM CST   (Arrive by 11:25 AM)   RETURN ARRHYTHMIA with Deedee Baird MD   [facility])    [address] [place] [id number]   [phone]            Dec 04, 2017 12:30 PM CST   LAB with  LAB   [facility] ([facility])    [address] [place] [id number] [phone]           Please do not eat 10-12 hours before your appointment if you are coming in fasting for labs on lipids, cholesterol, or glucose (sugar). This does not apply to pregnant women. Water, hot tea and black coffee (with nothing added) are okay. Do not drink other fluids, diet soda or chew gum.            Mar 30, 2018 11:15 AM CDT   LAB with  LAB    [facility] ([facility])    [address] [place] [id number] [phone]           Please do not eat 10-12 hours before your appointment if you are coming in fasting for labs on lipids, cholesterol, or glucose (sugar). This does not apply to pregnant women.  Water, hot tea and black coffee (with nothing added) are okay. Do not drink other fluids, diet soda or chew gum.            Mar 30, 2018 11:40 AM CDT   (Arrive by 11:25 AM)   CT CHEST/ABDOMEN/PELVIS W CONTRAST with UCCT2   [facility] [place] [facility] ([facility])    [address]  [address] [facility] [address] [phone]           Please bring any scans or X-rays taken at other hospitals, if similar tests were done. Also bring a list of your medicines, including vitamins, minerals and over-the-counter drugs. It is safest to leave personal items at home.  Be sure to tell your doctor:   If you have any allergies.   If there s any chance you are pregnant.   If you are breastfeeding.   If you have any special needs.  You may have contrast for this exam. To prepare:   Do not eat or drink for 2 hours before your exam. If you need to take medicine, you may take it with small sips of water. (We may ask you to take liquid medicine as well.)   The day before your exam, drink extra fluids at least six 8-ounce glasses (unless your doctor tells you to restrict your fluids).  Patients over 70 or patients with diabetes or kidney problems:   If you haven t had a blood test (creatinine test) within the last 30 days, go to your clinic or Diagnostic Imaging Department for this test.  If you have diabetes:   If your kidney function is normal, continue taking your metformin (Avandamet, Glucophage, Glucovance, Metaglip) on the day of your exam.   If your kidney function is abnormal, wait 48 hours before restarting this medicine.  You will have oral contrast for this exam:   You will drink the contrast at home. Get this from your clinic or Diagnostic Imaging Department. Please follow the directions given.  Please wear loose clothing, such as a sweat suit or jogging clothes. Avoid snaps, zippers and other metal. We may ask you to undress and put on a hospital gown.  If you have any questions,  "please call the Imaging Department where you will have your exam.            Apr 02, 2018 10:45 AM CDT   (Arrive by 10:30 AM)   Return Visit with Francisco Gilliam MD   [facility] ([facility] [place])    [address]  [address] [facility] [address] [phone]              Further instructions from your care team       PLEASE HOLD YOUR COUMADIN/WARFARIN TODAY (11/5), TOMORROW AM TAKE 2.5 MG INSTEAD OF YOUR USUAL 5 MG AND GO TO THE CLINIC TO HAVE YOUR INR CHECKED TOMORROW.    Pending Results     Date and Time Order Name Status Description    11/4/2017 1146 Blood culture Preliminary             Statement of Approval     Ordered          11/05/17 0918  I have reviewed and agree with all the recommendations and orders detailed in this document.  EFFECTIVE NOW     Approved and electronically signed by:  Barbara Posye APRN CNP             Admission Information     Date & Time Department Dept. Phone    11/4/2017 Unit 6D Observation [facility] [phone]      Your Vitals Were     Blood Pressure Pulse Temperature Respirations Height Weight    110/57 (BP Location: Left arm) 60 98.2  F (36.8  C) (Oral) 16 1.702 m (5' 7\") 68.5 kg (151 lb 2 oz)    Pulse Oximetry BMI (Body Mass Index)                96% 23.67 kg/m2          MyChart Information     [facility] gives you secure access to your electronic health record. If you see a primary care provider, you can also send messages to your care team and make appointments. If you have questions, please call your primary care clinic.  If you do not have a primary care provider, please call [phone] and they will assist you.        Care EveryWhere ID     This is your Care EveryWhere ID. This could be used by other organizations to access your [facility] medical records  [id number]        Equal Access to Services     LEE CASTILLO AH: jayashree Mace qaybta kaalmada adeegyada, waxay idiin " vane anguiano'aan ah. So [facility] [phone].    ATENCIÓN: Si marcusla siddhartha, tiene a aguirre disposición servicios gratuitos de asistencia lingüística. Lauren al [phone].    We comply with applicable federal civil rights laws and [place] laws. We do not discriminate on the basis of race, color, national origin, age, disability, sex, sexual orientation, or gender identity.               Review of your medicines      START taking        Dose / Directions    bacitracin ointment   Used for:  Left leg cellulitis        Apply topically 2 times daily APPLY TO LEFT LEG TWO TIMES PER DAY   Quantity:  28 g   Refills:  0       cephALEXin 500 MG capsule   Commonly known as:  KEFLEX   Used for:  Left leg cellulitis        Dose:  500 mg   Take 1 capsule (500 mg) by mouth 2 times daily for 7 days   Quantity:  14 capsule   Refills:  0       sulfamethoxazole-trimethoprim 800-160 MG per tablet   Commonly known as:  BACTRIM DS/SEPTRA DS   Used for:  Left leg cellulitis        Dose:  1 tablet   Take 1 tablet by mouth 2 times daily for 7 days   Quantity:  14 tablet   Refills:  0         CONTINUE these medicines which have NOT CHANGED        Dose / Directions    aspirin 81 MG tablet        Dose:  1 tablet   Take 1 tablet by mouth daily.   Refills:  0       atorvastatin 40 MG tablet   Commonly known as:  LIPITOR   Used for:  Hyperlipidemia, unspecified hyperlipidemia type        Dose:  40 mg   Take 1 tablet (40 mg) by mouth daily   Quantity:  90 tablet   Refills:  3       Blood Pressure Monitor Kit   Used for:  Hypertension        Dose:  1 Units   1 Units daily   Quantity:  1 kit   Refills:  0       CENTRUM SILVER per tablet        Dose:  1 tablet   Take 1 tablet by mouth daily. AM   Refills:  0       ciclopirox 0.77 % cream   Commonly known as:  LOPROX   Used for:  Dermatophytosis of nail, Tinea pedis of both feet        Apply topically 2 times daily To feet and toenails.   Quantity:  90 g   Refills:  6       cyanocobalamin 1000  MCG tablet   Commonly known as:  vitamin  B-12   Used for:  Anemia        Dose:  2000 mcg   Take 2 tablets (2,000 mcg) by mouth daily   Quantity:  180 tablet   Refills:  3       doxazosin 2 MG tablet   Commonly known as:  CARDURA   Used for:  Essential hypertension        Take 1/2 tab in the morning and 1/2 tab in the evening   Quantity:  90 tablet   Refills:  3       fluticasone 50 MCG/ACT spray   Commonly known as:  FLONASE   Used for:  Unspecified sinusitis (chronic)        Dose:  2 spray   Spray 2 sprays into both nostrils daily   Quantity:  3 Package   Refills:  0       ketoconazole 2 % cream   Commonly known as:  NIZORAL   Used for:  Tinea corporis        Apply topically daily On hold   Quantity:  60 g   Refills:  3       loratadine 10 MG tablet   Commonly known as:  CLARITIN        Dose:  10 mg   Take 10 mg by mouth as needed   Refills:  0       metoprolol 25 MG tablet   Commonly known as:  LOPRESSOR   Used for:  Coronary artery disease involving native heart without angina pectoris, unspecified vessel or lesion type        Dose:  25 mg   Take 1 tablet (25 mg) by mouth 2 times daily   Quantity:  180 tablet   Refills:  3       mirtazapine 15 MG tablet   Commonly known as:  REMERON        Dose:  15 mg   Take 15 mg by mouth At Bedtime.   Refills:  0       triamcinolone 0.1 % cream   Commonly known as:  KENALOG   Used for:  Atopic eczema        Apply topically 2 times daily For up to two weeks in a row   Quantity:  80 g   Refills:  3       warfarin 5 MG tablet   Commonly known as:  COUMADIN   Used for:  Atrial flutter (H)        7.5 mg on Wed; 5 mg all other days  or as instructed by coumadin clinic.   Quantity:  35 tablet   Refills:  5       ZOLOFT 100 MG tablet   Generic drug:  sertraline        Dose:  100 mg   Take 100 mg by mouth every evening.   Refills:  0            Where to get your medicines      These medications were sent to [facility] [place], [place] - [address]  [facility] [address]     Phone:  [phone]     bacitracin ointment    cephALEXin 500 MG capsule    sulfamethoxazole-trimethoprim 800-160 MG per tablet               ANTIBIOTIC INSTRUCTION     You've Been Prescribed an Antibiotic - Now What?  Your healthcare team thinks that you or your loved one might have an infection. Some infections can be treated with antibiotics, which are powerful, life-saving drugs. Like all medications, antibiotics have side effects and should only be used when necessary. There are some important things you should know about your antibiotic treatment.      Your healthcare team may run tests before you start taking an antibiotic.    Your team may take samples (e.g., from your blood, urine or other areas) to run tests to look for bacteria. These test can be important to determine if you need an antibiotic at all and, if you do, which antibiotic will work best.      Within a few days, your healthcare team might change or even stop your antibiotic.    Your team may start you on an antibiotic while they are working to find out what is making you sick.    Your team might change your antibiotic because test results show that a different antibiotic would be better to treat your infection.    In some cases, once your team has more information, they learn that you do not need an antibiotic at all. They may find out that you don't have an infection, or that the antibiotic you're taking won't work against your infection. For example, an infection caused by a virus can't be treated with antibiotics. Staying on an antibiotic when you don't need it is more likely to be harmful than helpful.      You may experience side effects from your antibiotic.    Like all medications, antibiotics have side effects. Some of these can be serious.    Let you healthcare team know if you have any known allergies when you are admitted to the hospital.    One significant side effect of nearly all  antibiotics is the risk of severe and sometimes deadly diarrhea caused by Clostridium difficile (C. Difficile). This occurs when a person takes antibiotics because some good germs are destroyed. Antibiotic use allows C. diificile to take over, putting patients at high risk for this serious infection.    As a patient or caregiver, it is important to understand your or your loved one's antibiotic treatment. It is especially important for caregivers to speak up when patients can't speak for themselves. Here are some important questions to ask your healthcare team.    What infection is this antibiotic treating and how do you know I have that infection?    What side effects might occur from this antibiotic?    How long will I need to take this antibiotic?    Is it safe to take this antibiotic with other medications or supplements (e.g., vitamins) that I am taking?     Are there any special directions I need to know about taking this antibiotic? For example, should I take it with food?    How will I be monitored to know whether my infection is responding to the antibiotic?    What tests may help to make sure the right antibiotic is prescribed for me?      Information provided by:  www.cdc.gov/getsmart  U.S. Department of Health and Human Services  Centers for disease Control and Prevention  National Center for Emerging and Zoonotic Infectious Diseases  Division of Healthcare Quality Promotion         Protect others around you: Learn how to safely use, store and throw away your medicines at www.disposemymeds.org.             Medication List: This is a list of all your medications and when to take them. Check marks below indicate your daily home schedule. Keep this list as a reference.      Medications           Morning Afternoon Evening Bedtime As Needed    aspirin 81 MG tablet   Take 1 tablet by mouth daily.                                atorvastatin 40 MG tablet   Commonly known as:  LIPITOR   Take 1 tablet (40 mg) by  mouth daily   Last time this was given:  40 mg on 11/5/2017 10:37 AM                                bacitracin ointment   Apply topically 2 times daily APPLY TO LEFT LEG TWO TIMES PER DAY   Last time this was given:  11/5/2017 10:39 AM                                Blood Pressure Monitor Kit   1 Units daily                                CENTRUM SILVER per tablet   Take 1 tablet by mouth daily. AM                                cephALEXin 500 MG capsule   Commonly known as:  KEFLEX   Take 1 capsule (500 mg) by mouth 2 times daily for 7 days                                ciclopirox 0.77 % cream   Commonly known as:  LOPROX   Apply topically 2 times daily To feet and toenails.                                cyanocobalamin 1000 MCG tablet   Commonly known as:  vitamin  B-12   Take 2 tablets (2,000 mcg) by mouth daily                                doxazosin 2 MG tablet   Commonly known as:  CARDURA   Take 1/2 tab in the morning and 1/2 tab in the evening   Last time this was given:  1 mg on 11/5/2017 10:39 AM                                fluticasone 50 MCG/ACT spray   Commonly known as:  FLONASE   Spray 2 sprays into both nostrils daily                                ketoconazole 2 % cream   Commonly known as:  NIZORAL   Apply topically daily On hold                                loratadine 10 MG tablet   Commonly known as:  CLARITIN   Take 10 mg by mouth as needed                                metoprolol 25 MG tablet   Commonly known as:  LOPRESSOR   Take 1 tablet (25 mg) by mouth 2 times daily   Last time this was given:  25 mg on 11/5/2017 10:39 AM                                mirtazapine 15 MG tablet   Commonly known as:  REMERON   Take 15 mg by mouth At Bedtime.   Last time this was given:  15 mg on 11/4/2017 10:42 PM                                sulfamethoxazole-trimethoprim 800-160 MG per tablet   Commonly known as:  BACTRIM DS/SEPTRA DS   Take 1 tablet by mouth 2 times daily for 7 days                                 triamcinolone 0.1 % cream   Commonly known as:  KENALOG   Apply topically 2 times daily For up to two weeks in a row                                warfarin 5 MG tablet   Commonly known as:  COUMADIN   7.5 mg on Wed; 5 mg all other days  or as instructed by coumadin clinic.                                ZOLOFT 100 MG tablet   Take 100 mg by mouth every evening.   Last time this was given:  100 mg on 11/4/2017  9:24 PM   Generic drug:  sertraline

## 2017-11-04 NOTE — PLAN OF CARE
Problem: Patient Care Overview  Goal: Plan of Care/Patient Progress Review  Outpatient/Observation goals to be met before discharge home:     List all goals to be met before discharge home:   - Tolerating oral antibiotics or has plans for home infusion set up - NO, received IV Unisyn, IV Vanco Due at this time  - Vital signs normal or at patient baseline - YES  - Adequate pain control on oral analgesia - YES, denies pain  - Infection is improving - NO, left leg marked upon arrival on unit  - Color, warmth, movement, sensation of affected area or limb is intact or at baseline - NO, bilateral LE erythremic, warm to touch  - Return to baseline functional status - NO, generalized weak, slightly unsteady  - Safe disposition plan has been identified - NO  - Nurse to notify provider when observation goals have been met and patient is ready for discharge

## 2017-11-04 NOTE — ED PROVIDER NOTES
History     Chief Complaint   Patient presents with     Leg Swelling     LLE     Wound Infection     LLE     HPI  Noe Florence is a 82 year old male with a history of CAD, history of atrial flutter s/p ablation and nonsustained SVT s/p pacemaker placement, colon cancer, CKD III, history of DVT on Coumadin, and polymyalgia rheumatica who presents for evaluation of left lower leg infection. The patient reports that a few days he noted what appeared to be a large blister to his left shin. This wound opened up and since then he has been having drainage and development of redness and swelling in the area, concern for infection. The patient states that he's had no falls or injuries to the leg recently that he recalls. He denies any pain of the leg. He also denies any fevers or chills. No chest pain, shortness of breath, or cough. The patient is anticoagulated on Coumadin.    Current Facility-Administered Medications   Medication     lidocaine 1 % 1 mL     lidocaine (LMX4) kit     sodium chloride (PF) 0.9% PF flush 3 mL     sodium chloride (PF) 0.9% PF flush 3 mL     lidocaine (PF) (XYLOCAINE) 1 % injection 10 mg     atorvastatin (LIPITOR) tablet 40 mg     aspirin chewable tablet 81 mg     doxazosin (CARDURA) tablet 1 mg     mirtazapine (REMERON) tablet 15 mg     sertraline (ZOLOFT) tablet 100 mg     metoprolol (LOPRESSOR) tablet 25 mg     lidocaine 1 % 1 mL     lidocaine (LMX4) kit     sodium chloride (PF) 0.9% PF flush 3 mL     sodium chloride (PF) 0.9% PF flush 3 mL     ondansetron (ZOFRAN-ODT) ODT tab 4 mg    Or     ondansetron (ZOFRAN) injection 4 mg     acetaminophen (TYLENOL) tablet 650 mg     naloxone (NARCAN) injection 0.1-0.4 mg     Warfarin Therapy Reminder (Check START DATE - warfarin may be starting in the FUTURE)     bacitracin ointment     vancomycin (VANCOCIN) 1000 mg in dextrose 5% 200 mL PREMIX     Facility-Administered Medications Ordered in Other Encounters   Medication     glycopyrrolate (ROBINUL)  injection     neostigmine (PROSTIGMINE) injection     Past Medical History:   Diagnosis Date     Advanced open-angle glaucoma      Anemia     post-op colectomy     Antiplatelet or antithrombotic long-term use      Arrhythmia      Arthritis     hands     Atrial fibrillation (H)      CKD (chronic kidney disease) stage 3, GFR 30-59 ml/min      CKD (chronic kidney disease), stage III 2005     Colon cancer (H)     Stage II-B colon cancer     Coronary artery disease     s/p CABG x 2, JEREMY x 2     Diverticulitis      History of blood transfusion      Hyperlipidemia      Hypertension      Ischemic cardiomyopathy      MGUS (monoclonal gammopathy of unknown significance)      Nonsenile cataract     BE     Pacemaker      Polymorphic ventricular tachycardia (H)      Polymyalgia rheumatica (H)      PVD (posterior vitreous detachment), both eyes 2005     S/P ablation of atrial flutter 6/20/14    CTI     Stented coronary artery      SVT (supraventricular tachycardia) (H)     PPM/AICD for NSVT     Thrombosis of leg     Left leg     Upper leg DVT (deep venous thromboembolism), chronic (H)     Left     Weight loss, unintentional 2017    15 lb in 4 months       Past Surgical History:   Procedure Laterality Date     C CABG, ARTERY-VEIN, FOUR  2006    LIMA-LAD, SVG-Rt PDA, SVG-OM2, SVG-Diag 1     C CABG, VEIN, SINGLE  1994    1-vessel CABG, SVG->PDRCA      CATARACT IOL, RT/LT Bilateral      COLONOSCOPY  3/13/2014    Procedure: COMBINED COLONOSCOPY, SINGLE BIOPSY/POLYPECTOMY BY BIOPSY;;  Surgeon: Mary Gerber MD;  Location: [facility]     COLONOSCOPY N/A 6/22/2015    Procedure: COLONOSCOPY;  Surgeon: Marilin Newman MD;  Location:  GI     H ABLATION ATRIAL FLUTTER       HEART CATH DRUG ELUTING STENT PLACEMENT  4/19/2012    JEREMY x 2 to LCx     IMPLANT IMPLANTABLE CARDIOVERTER DEFIBRILLATOR  5-    ppm/aicd     KNEE SURGERY      right and left knee surgeries     LAPAROSCOPIC ASSISTED COLECTOMY LEFT (DESCENDING)  4/8/2014     Procedure: LAPAROSCOPIC ASSISTED COLECTOMY LEFT (DESCENDING);  Hand assisted Laparoscopic Sigmoid Colectomy , Laparoscopic mobilization of spleenic flexure *Latex Allergy*Anesthesia General with Block;  Surgeon: Chanelle Guzmán MD;  Location: [facility]     REPAIR VALVE MITRAL  2006     30-mm Medtronic Julian ring      SELECTIVE LASER TRABECULOPLASTY (SLT) OD (RIGHT EYE)  4/10, 1/12,+1/9/13    RE     SELECTIVE LASER TRABECULOPLASTY (SLT) OS (LEFT EYE)  5/2012     SHOULDER SURGERY      right rotator cuff       Family History   Problem Relation Age of Onset     C.A.D. Father      Anesthesia Reaction No family hx of      Crohn Disease No family hx of      Ulcerative Colitis No family hx of      Cancer - colorectal No family hx of      Macular Degeneration No family hx of      CANCER No family hx of      No known family hx of skin cancer       Social History   Substance Use Topics     Smoking status: Former Smoker     Types: Cigarettes, Cigars     Quit date: 1/1/1968     Smokeless tobacco: Never Used     Alcohol use 0.0 oz/week     0 Standard drinks or equivalent per week      Comment: 2-3 drinks twice weekly        Allergies   Allergen Reactions     Latex Rash     Rash       I have reviewed the Medications, Allergies, Past Medical and Surgical History, and Social History in the Epic system.    Review of Systems   Constitutional: Positive for activity change. Negative for chills and fever.   HENT: Negative for congestion.    Eyes: Negative for redness.   Respiratory: Negative for cough and shortness of breath.    Cardiovascular: Positive for leg swelling (some swelling of the left lower extremity associated with likely infection). Negative for chest pain.   Gastrointestinal: Negative for abdominal pain.   Genitourinary: Negative for difficulty urinating.   Musculoskeletal: Negative for arthralgias and neck stiffness.   Skin: Positive for color change (redness of the left shin associated with wound) and wound  "(left shin wound of unclear etiology, with drainage and redness).   Neurological: Negative for headaches.   Psychiatric/Behavioral: Negative for confusion.   All other systems reviewed and are negative.      Physical Exam   BP: 133/61  Pulse: 60  Heart Rate: 61  Temp: 97.7  F (36.5  C)  Resp: 17  Height: 170.2 cm (5' 7\")  Weight: 68.5 kg (151 lb 2 oz)  SpO2: 100 %      Physical Exam   Constitutional: He is oriented to person, place, and time. He appears well-developed and well-nourished. He appears distressed.   Minimal distress at this point patient pleasant here nontoxic   HENT:   Head: Normocephalic and atraumatic.   Eyes: EOM are normal. Pupils are equal, round, and reactive to light. No scleral icterus.   Neck: Normal range of motion. Neck supple.   Cardiovascular: Regular rhythm.    Pulmonary/Chest: No respiratory distress.   Abdominal: There is no guarding.   Musculoskeletal: He exhibits edema and tenderness.        Legs:  Left anterior tibial area erythema noted consistent with cellulitis around wound   Neurological: He is alert and oriented to person, place, and time. He has normal reflexes. No cranial nerve deficit. Coordination normal.   Skin: Skin is warm and dry. No rash noted. He is not diaphoretic. There is erythema. No pallor.   Psychiatric: He has a normal mood and affect. His behavior is normal. Judgment and thought content normal.   Nursing note and vitals reviewed.      ED Course     11:34 AM  The patient was seen and examined by Ernst Bledsoe MD, in Room 23.     ED Course     Procedures         Patient value in ER.  IV established labs drawn.  X-rays lower extremity did not reveal foreign body fracture or subcutaneous air  Wound culture done of the left lower extremity.  White count 6.6 hemoglobin 10.5 platelets 192  INR 2.89  Creatinine 1.24 BUN 35 glucose 116    Blood culture sent also.    Patient stable in ER discussed plan at this point with the marked erythema we'll plan to admit for lower " leg cellulitis.  Patient agrees with plan patient started on vancomycin along with Unasyn IV.  Patient was then seen by the [facility] in the ER.  Patient admitted to ED observation for IV antibiotic therapy and further observation.    Critical Care time:  none             Labs Ordered and Resulted from Time of ED Arrival Up to the Time of Departure from the ED   CBC WITH PLATELETS DIFFERENTIAL - Abnormal; Notable for the following:        Result Value    RBC Count 3.44 (*)     Hemoglobin 10.5 (*)     Hematocrit 33.5 (*)     MCHC 31.3 (*)     All other components within normal limits   INR - Abnormal; Notable for the following:     INR 2.89 (*)     All other components within normal limits   COMPREHENSIVE METABOLIC PANEL - Abnormal; Notable for the following:     Chloride 112 (*)     Glucose 116 (*)     Urea Nitrogen 35 (*)     GFR Estimate 56 (*)     Albumin 3.2 (*)     All other components within normal limits   GRAM STAIN - Abnormal; Notable for the following:     Gram Stain   (*)     Value: Rare  Gram positive cocci      Gram Stain   (*)     Value: Rare  Gram positive rods      All other components within normal limits   CRP INFLAMMATION   PARTIAL THROMBOPLASTIN TIME   PERIPHERAL IV CATHETER   WOUND CULTURE AEROBIC BACTERIAL     Results for orders placed or performed during the hospital encounter of 11/04/17   Tib/Fib XR, left    Narrative    XR TIBIA & FIBULA LT 2 VW 11/4/2017 1:00 PM    History: left leg wound    Comparison: 6/17/2014.    Findings: Extensive vascular calcifications. No fracture. No bony  change to suggest osteomyelitis. Bandage and soft tissue defect over  the anterior shin without evidence for underlying bony abnormality.  Osteoarthritis in the knee.      Impression    Impression: No acute bony abnormality. Extensive vascular  calcifications.    YOLANDA BIRD   CBC with platelets differential   Result Value Ref Range    WBC 6.6 4.0 - 11.0 10e9/L    RBC Count 3.44 (L) 4.4 - 5.9 10e12/L    Hemoglobin  10.5 (L) 13.3 - 17.7 g/dL    Hematocrit 33.5 (L) 40.0 - 53.0 %    MCV 97 78 - 100 fl    MCH 30.5 26.5 - 33.0 pg    MCHC 31.3 (L) 31.5 - 36.5 g/dL    RDW 14.3 10.0 - 15.0 %    Platelet Count 192 150 - 450 10e9/L    Diff Method Automated Method     % Neutrophils 71.9 %    % Lymphocytes 14.8 %    % Monocytes 9.9 %    % Eosinophils 2.9 %    % Basophils 0.3 %    % Immature Granulocytes 0.2 %    Nucleated RBCs 0 0 /100    Absolute Neutrophil 4.8 1.6 - 8.3 10e9/L    Absolute Lymphocytes 1.0 0.8 - 5.3 10e9/L    Absolute Monocytes 0.7 0.0 - 1.3 10e9/L    Absolute Eosinophils 0.2 0.0 - 0.7 10e9/L    Absolute Basophils 0.0 0.0 - 0.2 10e9/L    Abs Immature Granulocytes 0.0 0 - 0.4 10e9/L    Absolute Nucleated RBC 0.0    CRP inflammation   Result Value Ref Range    CRP Inflammation 3.4 0.0 - 8.0 mg/L   INR   Result Value Ref Range    INR 2.89 (H) 0.86 - 1.14   Partial thromboplastin time   Result Value Ref Range    PTT 31 22 - 37 sec   Comprehensive metabolic panel   Result Value Ref Range    Sodium 144 133 - 144 mmol/L    Potassium 4.5 3.4 - 5.3 mmol/L    Chloride 112 (H) 94 - 109 mmol/L    Carbon Dioxide 27 20 - 32 mmol/L    Anion Gap 4 3 - 14 mmol/L    Glucose 116 (H) 70 - 99 mg/dL    Urea Nitrogen 35 (H) 7 - 30 mg/dL    Creatinine 1.24 0.66 - 1.25 mg/dL    GFR Estimate 56 (L) >60 mL/min/1.7m2    GFR Estimate If Black 67 >60 mL/min/1.7m2    Calcium 8.9 8.5 - 10.1 mg/dL    Bilirubin Total 0.3 0.2 - 1.3 mg/dL    Albumin 3.2 (L) 3.4 - 5.0 g/dL    Protein Total 7.0 6.8 - 8.8 g/dL    Alkaline Phosphatase 102 40 - 150 U/L    ALT 20 0 - 70 U/L    AST 12 0 - 45 U/L   Blood culture   Result Value Ref Range    Specimen Description Blood Right Arm     Culture Micro No growth after 15 hours    Gram stain   Result Value Ref Range    Specimen Description Left Leg Lower Wound     Special Requests Specimen collected in eSwab transport (white cap)     Gram Stain Rare  Gram positive cocci   (A)     Gram Stain Rare  Gram positive rods   (A)      Gram Stain Few  WBC'S seen  predominantly PMN's      Wound Culture Aerobic Bacterial   Result Value Ref Range    Specimen Description Left Leg Lower Wound     Special Requests Specimen collected in eSwab transport (white cap)     Culture Micro PENDING      *Note: Due to a large number of results and/or encounters for the requested time period, some results have not been displayed. A complete set of results can be found in Results Review.            Assessments & Plan (with Medical Decision Making)  82-year-old male history of polymyalgia rheumatica history of Coumadin therapy who has a left lower extremity wound now with erythema.  Patient appears nontoxic labs stable.  Wound culture sent patient admitted for left leg cellulitis to ED observation overnight started vancomycin and Unasyn patient otherwise stable and agrees with plan.             I have reviewed the nursing notes.    I have reviewed the findings, diagnosis, plan and need for follow up with the patient.    Current Discharge Medication List          Final diagnoses:   Left leg cellulitis     IPrieto, am serving as a trained medical scribe to document services personally performed by Ernst Bledsoe MD, based on the provider's statements to me.      Ernst LUU MD, was physically present and have reviewed and verified the accuracy of this note documented by Prieto Carlin.    11/4/2017   [facility] EMERGENCY DEPARTMENT    This note was created at least in part by the use of dragon voice dictation system. Inadvertent typographical errors may still exist.  Ernst Bledsoe MD.         Ernst Bledsoe MD  11/05/17 0714

## 2017-11-05 VITALS
BODY MASS INDEX: 23.72 KG/M2 | DIASTOLIC BLOOD PRESSURE: 57 MMHG | OXYGEN SATURATION: 96 % | HEIGHT: 67 IN | TEMPERATURE: 98.2 F | RESPIRATION RATE: 16 BRPM | HEART RATE: 60 BPM | WEIGHT: 151.13 LBS | SYSTOLIC BLOOD PRESSURE: 110 MMHG

## 2017-11-05 LAB
ANION GAP SERPL CALCULATED.3IONS-SCNC: 6 MMOL/L (ref 3–14)
BASOPHILS # BLD AUTO: 0 10E9/L (ref 0–0.2)
BASOPHILS NFR BLD AUTO: 0.2 %
BUN SERPL-MCNC: 28 MG/DL (ref 7–30)
CALCIUM SERPL-MCNC: 8.2 MG/DL (ref 8.5–10.1)
CHLORIDE SERPL-SCNC: 113 MMOL/L (ref 94–109)
CO2 SERPL-SCNC: 25 MMOL/L (ref 20–32)
CREAT SERPL-MCNC: 1.12 MG/DL (ref 0.66–1.25)
DIFFERENTIAL METHOD BLD: ABNORMAL
EOSINOPHIL # BLD AUTO: 0.3 10E9/L (ref 0–0.7)
EOSINOPHIL NFR BLD AUTO: 4.8 %
ERYTHROCYTE [DISTWIDTH] IN BLOOD BY AUTOMATED COUNT: 14.1 % (ref 10–15)
GFR SERPL CREATININE-BSD FRML MDRD: 63 ML/MIN/1.7M2
GLUCOSE SERPL-MCNC: 83 MG/DL (ref 70–99)
HCT VFR BLD AUTO: 30.5 % (ref 40–53)
HGB BLD-MCNC: 9.6 G/DL (ref 13.3–17.7)
IMM GRANULOCYTES # BLD: 0 10E9/L (ref 0–0.4)
IMM GRANULOCYTES NFR BLD: 0.2 %
INR PPP: 3.31 (ref 0.86–1.14)
LYMPHOCYTES # BLD AUTO: 0.8 10E9/L (ref 0.8–5.3)
LYMPHOCYTES NFR BLD AUTO: 12.5 %
MCH RBC QN AUTO: 30.3 PG (ref 26.5–33)
MCHC RBC AUTO-ENTMCNC: 31.5 G/DL (ref 31.5–36.5)
MCV RBC AUTO: 96 FL (ref 78–100)
MONOCYTES # BLD AUTO: 0.7 10E9/L (ref 0–1.3)
MONOCYTES NFR BLD AUTO: 11 %
NEUTROPHILS # BLD AUTO: 4.3 10E9/L (ref 1.6–8.3)
NEUTROPHILS NFR BLD AUTO: 71.3 %
NRBC # BLD AUTO: 0 10*3/UL
NRBC BLD AUTO-RTO: 0 /100
PLATELET # BLD AUTO: 180 10E9/L (ref 150–450)
POTASSIUM SERPL-SCNC: 4.4 MMOL/L (ref 3.4–5.3)
RBC # BLD AUTO: 3.17 10E12/L (ref 4.4–5.9)
SODIUM SERPL-SCNC: 144 MMOL/L (ref 133–144)
WBC # BLD AUTO: 6 10E9/L (ref 4–11)

## 2017-11-05 PROCEDURE — 36415 COLL VENOUS BLD VENIPUNCTURE: CPT | Performed by: NURSE PRACTITIONER

## 2017-11-05 PROCEDURE — G0378 HOSPITAL OBSERVATION PER HR: HCPCS

## 2017-11-05 PROCEDURE — 85610 PROTHROMBIN TIME: CPT | Performed by: NURSE PRACTITIONER

## 2017-11-05 PROCEDURE — 85025 COMPLETE CBC W/AUTO DIFF WBC: CPT | Performed by: NURSE PRACTITIONER

## 2017-11-05 PROCEDURE — 80048 BASIC METABOLIC PNL TOTAL CA: CPT | Performed by: NURSE PRACTITIONER

## 2017-11-05 PROCEDURE — 99217 ZZC OBSERVATION CARE DISCHARGE: CPT | Mod: Z6 | Performed by: EMERGENCY MEDICINE

## 2017-11-05 PROCEDURE — 25000132 ZZH RX MED GY IP 250 OP 250 PS 637: Performed by: NURSE PRACTITIONER

## 2017-11-05 RX ORDER — CEPHALEXIN 500 MG/1
500 CAPSULE ORAL 2 TIMES DAILY
Qty: 14 CAPSULE | Refills: 0 | Status: SHIPPED | OUTPATIENT
Start: 2017-11-05 | End: 2017-11-12

## 2017-11-05 RX ORDER — SULFAMETHOXAZOLE/TRIMETHOPRIM 800-160 MG
1 TABLET ORAL 2 TIMES DAILY
Qty: 14 TABLET | Refills: 0 | Status: SHIPPED | OUTPATIENT
Start: 2017-11-05 | End: 2017-11-12

## 2017-11-05 RX ORDER — BACITRACIN ZINC 500 [USP'U]/G
OINTMENT TOPICAL 2 TIMES DAILY
Qty: 28 G | Refills: 0 | Status: SHIPPED | OUTPATIENT
Start: 2017-11-05 | End: 2019-01-18

## 2017-11-05 RX ADMIN — METOPROLOL TARTRATE 25 MG: 25 TABLET ORAL at 10:39

## 2017-11-05 RX ADMIN — ASPIRIN 81 MG CHEWABLE TABLET 81 MG: 81 TABLET CHEWABLE at 10:37

## 2017-11-05 RX ADMIN — BACITRACIN: 500 OINTMENT TOPICAL at 10:39

## 2017-11-05 RX ADMIN — ATORVASTATIN CALCIUM 40 MG: 40 TABLET, FILM COATED ORAL at 10:37

## 2017-11-05 RX ADMIN — DOXAZOSIN 1 MG: 1 TABLET ORAL at 10:39

## 2017-11-05 ASSESSMENT — ENCOUNTER SYMPTOMS: ACTIVITY CHANGE: 1

## 2017-11-05 NOTE — PHARMACY-VANCOMYCIN DOSING SERVICE
Pharmacy Vancomycin Initial Note  Date of Service 2017  Patient's  1935  82 year old, male    Indication: Skin and Soft Tissue Infection    Current estimated CrCl = Estimated Creatinine Clearance: 44.6 mL/min (based on Cr of 1.24).    Creatinine for last 3 days  2017: 12:26 PM Creatinine 1.24 mg/dL    Recent Vancomycin Level(s) for last 3 days  No results found for requested labs within last 72 hours.      Vancomycin IV Administrations (past 72 hours)      No vancomycin orders with administrations in past 72 hours.                Nephrotoxins and other renal medications (Future)    Start     Dose/Rate Route Frequency Ordered Stop    17  vancomycin (VANCOCIN) 1000 mg in dextrose 5% 200 mL PREMIX      1,000 mg  200 mL/hr over 1 Hours Intravenous EVERY 24 HOURS 17            Contrast Orders - past 72 hours     None                Plan:  1.  Start vancomycin  1000 mg IV q24h.   2.  Goal Trough Level: 10-15 mg/L   3.  Pharmacy will check trough levels as appropriate in 1-3 Days.    4. Serum creatinine levels will be ordered every other day for at least 10 days while on concomitant nephrotoxins.    5. [place] method utilized to dose vancomycin therapy: Method 1    Brii Clemens, LisbetD

## 2017-11-05 NOTE — PLAN OF CARE
Problem: Patient Care Overview  Goal: Plan of Care/Patient Progress Review  Outpatient/Observation goals to be met before discharge home:      List all goals to be met before discharge home:   - Tolerating oral antibiotics or has plans for home infusion set up - YES,  IV Vanco given at midnight. Patient being discharged with 2 oral Antibiotics.  - Vital signs normal or at patient baseline - YES  - Adequate pain control on oral analgesia - YES, denies pain  - Infection is improving - YES, left leg marked upon arrival on unit, redress decreased from admission, dressing due to be changed once caregiver arrives.  - Color, warmth, movement, sensation of affected area or limb is intact or at baseline - NO, bilateral LE erythremic, warm to touch, awaiting dressing change  - Return to baseline functional status - YES  - Safe disposition plan has been identified - YES, awaiting ride/caregive  - Nurse to notify provider when observation goals have been met and patient is ready for discharge

## 2017-11-05 NOTE — PROGRESS NOTES
/69, HR 60, afebrile, on room air,  Denies need for pain medication, denies nausea. Up to bathroom with SBA, voiding, BM x1.  Left leg redness, dressing intact.  Slept between cares.  Continue to monitor.

## 2017-11-05 NOTE — PROGRESS NOTES
Problem: Patient Care Overview  Goal: Plan of Care/Patient Progress Review  Outpatient/Observation goals to be met before discharge home:      List all goals to be met before discharge home:   - Tolerating oral antibiotics or has plans for home infusion set up - NO,  IV Unisyn, waiting for Vanco from pharmacy  - Vital signs normal or at patient baseline - YES, BP improved 126/69  - Adequate pain control on oral analgesia - YES, denies pain  - Infection is improving - NO, left leg marked upon arrival on unit  - Color, warmth, movement, sensation of affected area or limb is intact or at baseline - NO, bilateral LE erythremic, warm to touch  - Return to baseline functional status - NO, generalized weak  - Safe disposition plan has been identified - NO  - Nurse to notify provider when observation goals have been met and patient is ready for discharge

## 2017-11-05 NOTE — PHARMACY-ANTICOAGULATION SERVICE
Clinical Pharmacy- Warfarin Discharge Note  This patient is currently on warfarin for the treatment of DVT.  INR Goal= 2-3  Expected length of therapy undetermined.    Anticoagulation Dose History     Recent Dosing and Labs Latest Ref Rng & Units 7/31/2017 8/14/2017 8/30/2017 9/22/2017 10/17/2017 11/4/2017 11/5/2017    INR 0.86 - 1.14 1.89(H) 2.93(H) 3.17(H) 2.62(H) 2.28(H) 2.89(H) 3.31(H)    INR Point of Care 0.86 - 1.14 - - - - - - -          Pt is headed home with a prescription of Bactrim to start today, INR today was 3.31.  For discharge, recommended to hold for today and plan to give 2.5 mg tomorrow, will need to check INR daily during and after Bactrim is completed to monitor and adjust warfarin appropriately.

## 2017-11-05 NOTE — DISCHARGE SUMMARY
Discharge Summary    Noe Florence MRN# [id number]   YOB: 1935 Age: 82 year old     Date of Admission:  11/4/2017  Date of Discharge:  11/5/2017  Admitting Physician:  Cristal Cox MD  Discharge Physician:  Cristal Cox  Discharging Service:  Emergency Medicine     Primary Provider: Roberto Sarmiento          Discharge Diagnosis:     Skin infection    * No resolved hospital problems. *               Discharge Disposition:   Discharged to home           Condition on Discharge:   Discharge condition: Stable   Code status on discharge: Full Code           Procedures:   No procedures performed during this admission          Discharge Medications:     Current Discharge Medication List      START taking these medications    Details   sulfamethoxazole-trimethoprim (BACTRIM DS/SEPTRA DS) 800-160 MG per tablet Take 1 tablet by mouth 2 times daily for 7 days  Qty: 14 tablet, Refills: 0    Associated Diagnoses: Left leg cellulitis      cephALEXin (KEFLEX) 500 MG capsule Take 1 capsule (500 mg) by mouth 2 times daily for 7 days  Qty: 14 capsule, Refills: 0    Associated Diagnoses: Left leg cellulitis      bacitracin ointment Apply topically 2 times daily APPLY TO LEFT LEG TWO TIMES PER DAY  Qty: 28 g, Refills: 0    Associated Diagnoses: Left leg cellulitis         CONTINUE these medications which have NOT CHANGED    Details   warfarin (COUMADIN) 5 MG tablet 7.5 mg on Wed; 5 mg all other days  or as instructed by coumadin clinic.  Qty: 35 tablet, Refills: 5    Associated Diagnoses: Atrial flutter (H)      Blood Pressure Monitor KIT 1 Units daily  Qty: 1 kit, Refills: 0    Associated Diagnoses: Hypertension      cyanocobalamin (VITAMIN  B-12) 1000 MCG tablet Take 2 tablets (2,000 mcg) by mouth daily  Qty: 180 tablet, Refills: 3    Associated Diagnoses: Anemia      doxazosin (CARDURA) 2 MG tablet Take 1/2 tab in the morning and 1/2 tab in the evening  Qty: 90 tablet, Refills: 3     Associated Diagnoses: Essential hypertension      atorvastatin (LIPITOR) 40 MG tablet Take 1 tablet (40 mg) by mouth daily  Qty: 90 tablet, Refills: 3    Associated Diagnoses: Hyperlipidemia, unspecified hyperlipidemia type      metoprolol (LOPRESSOR) 25 MG tablet Take 1 tablet (25 mg) by mouth 2 times daily  Qty: 180 tablet, Refills: 3    Associated Diagnoses: Coronary artery disease involving native heart without angina pectoris, unspecified vessel or lesion type      ciclopirox (LOPROX) 0.77 % cream Apply topically 2 times daily To feet and toenails.  Qty: 90 g, Refills: 6    Associated Diagnoses: Dermatophytosis of nail; Tinea pedis of both feet      triamcinolone (KENALOG) 0.1 % cream Apply topically 2 times daily For up to two weeks in a row  Qty: 80 g, Refills: 3    Associated Diagnoses: Atopic eczema      ketoconazole (NIZORAL) 2 % cream Apply topically daily On hold  Qty: 60 g, Refills: 3    Associated Diagnoses: Tinea corporis      loratadine (CLARITIN) 10 MG tablet Take 10 mg by mouth as needed       fluticasone (FLONASE) 50 MCG/ACT nasal spray Spray 2 sprays into both nostrils daily  Qty: 3 Package, Refills: 0    Associated Diagnoses: Unspecified sinusitis (chronic)      Multiple Vitamins-Minerals (CENTRUM SILVER) per tablet Take 1 tablet by mouth daily. AM       aspirin 81 MG tablet Take 1 tablet by mouth daily.      mirtazapine (REMERON) 15 MG tablet Take 15 mg by mouth At Bedtime.      sertraline (ZOLOFT) 100 MG tablet Take 100 mg by mouth every evening.                   Consultations:   No consultations were requested during this admission             Brief History of Illness:   Please see detailed H&P from 11/4/17, in brief: Noe Florence is a 82 year old male with PMH significant for DVT on coumadin, polymyalgia rheumatica, NSVT s/p PPM/AICD, CAD (s/p CABG X 2, JEREMY X 2), atrial flutter, nonsenile cataract, ischemic cardiomyopathy, hypertension, hyperlipidemia, colon cancer, SKD stage III, and  "arthritis who presented to the ER with complaints of left leg swelling and infection.            Hospital Course:   1. Left lower extremity cellulitis:  VSS, afebrile, CRP not elevated and no leukocytosis.  Blood culture and wound culture pending.  XR negative for acute abnormalities.  Left LE erythema improved, area of blistering healing, bacitracin and non adherent guaze applied, patient inctructed to do this BID for next week.  Follow up with PCP in one week.  Wound gram stain shows gram positive cocci and rods.  Culture pending.  Blood culture with no growth to date.  VSS, afebrile, no leukocytosis.    -transition to oral bactrim and keflex X 1 week       Chronic problems:  #CAD (s/p CABG X 2: 1996, 2006, JEREMY X 2: 2012), hyperlipidemia, hypertension:  Follows with Dr. Kenyon.  Continue PTA doxazosin, atorvastatin, metoprolol  #DVT on coumadin: Today's INR 3.31, pharmacy recommending holding coumadin today and taking half dose (2.5 mg) tomorrow, recommended and ordered patient to have INR check tomorrow.    #Stage 2 colon cancer: s/p colectomy and adjunctive chemotherapy, follows with hem[facility]onc at [facility].  No evidence of recurrence.  #CKD stage 3: creatinine at baseline 1.12.    #NSVT, afib/flutter s/p ICD placement, ablation:  Continue PTA metoprolol, follows with Dr. Baird  Depression: continue PTA sertraline  Anemia: Hgb at baseline 10.5                  Final Day of Progress before Discharge:       Physical Exam:  Blood pressure 110/62, pulse 60, temperature 98.2  F (36.8  C), temperature source Oral, resp. rate 16, height 1.702 m (5' 7\"), weight 68.5 kg (151 lb 2 oz), SpO2 96 %.    EXAM:  Exam:  Constitutional: healthy, alert and no distress  Head: Normocephalic. No masses, lesions, tenderness or abnormalities  Cardiovascular: No lifts, heaves, or thrills. RRR. No murmurs, clicks gallops or rub  Respiratory: Good diaphragmatic excursion. Lungs clear  Gastrointestinal: Abdomen soft, non-tender. BS normal. No " "masses, organomegaly  Musculoskeletal: extremities normal- no gross deformities noted, gait normal and normal muscle tone  Skin: no suspicious lesions or rashes, bilateral venous stasis skin changes, LLE with healing popped blister with serous/sanginous drainage on dressing, surrounding erythema is improving, no significant swelling.  Neurologic: Gait normal. Alert and oriented.   Psychiatric: mentation appears normal and affect normal/bright    /62 (BP Location: Left arm)  Pulse 60  Temp 98.2  F (36.8  C) (Oral)  Resp 16  Ht 1.702 m (5' 7\")  Wt 68.5 kg (151 lb 2 oz)  SpO2 96%  BMI 23.67 kg/m2           Data:  All laboratory data reviewed             Significant Results:     Results for orders placed or performed during the hospital encounter of 11/04/17   Tib/Fib XR, left    Narrative    XR TIBIA & FIBULA LT 2 VW 11/4/2017 1:00 PM    History: left leg wound    Comparison: 6/17/2014.    Findings: Extensive vascular calcifications. No fracture. No bony  change to suggest osteomyelitis. Bandage and soft tissue defect over  the anterior shin without evidence for underlying bony abnormality.  Osteoarthritis in the knee.      Impression    Impression: No acute bony abnormality. Extensive vascular  calcifications.    YOLANDA BIRD   CBC with platelets differential   Result Value Ref Range    WBC 6.6 4.0 - 11.0 10e9/L    RBC Count 3.44 (L) 4.4 - 5.9 10e12/L    Hemoglobin 10.5 (L) 13.3 - 17.7 g/dL    Hematocrit 33.5 (L) 40.0 - 53.0 %    MCV 97 78 - 100 fl    MCH 30.5 26.5 - 33.0 pg    MCHC 31.3 (L) 31.5 - 36.5 g/dL    RDW 14.3 10.0 - 15.0 %    Platelet Count 192 150 - 450 10e9/L    Diff Method Automated Method     % Neutrophils 71.9 %    % Lymphocytes 14.8 %    % Monocytes 9.9 %    % Eosinophils 2.9 %    % Basophils 0.3 %    % Immature Granulocytes 0.2 %    Nucleated RBCs 0 0 /100    Absolute Neutrophil 4.8 1.6 - 8.3 10e9/L    Absolute Lymphocytes 1.0 0.8 - 5.3 10e9/L    Absolute Monocytes 0.7 0.0 - 1.3 10e9/L "    Absolute Eosinophils 0.2 0.0 - 0.7 10e9/L    Absolute Basophils 0.0 0.0 - 0.2 10e9/L    Abs Immature Granulocytes 0.0 0 - 0.4 10e9/L    Absolute Nucleated RBC 0.0    CRP inflammation   Result Value Ref Range    CRP Inflammation 3.4 0.0 - 8.0 mg/L   INR   Result Value Ref Range    INR 2.89 (H) 0.86 - 1.14   Partial thromboplastin time   Result Value Ref Range    PTT 31 22 - 37 sec   Comprehensive metabolic panel   Result Value Ref Range    Sodium 144 133 - 144 mmol/L    Potassium 4.5 3.4 - 5.3 mmol/L    Chloride 112 (H) 94 - 109 mmol/L    Carbon Dioxide 27 20 - 32 mmol/L    Anion Gap 4 3 - 14 mmol/L    Glucose 116 (H) 70 - 99 mg/dL    Urea Nitrogen 35 (H) 7 - 30 mg/dL    Creatinine 1.24 0.66 - 1.25 mg/dL    GFR Estimate 56 (L) >60 mL/min/1.7m2    GFR Estimate If Black 67 >60 mL/min/1.7m2    Calcium 8.9 8.5 - 10.1 mg/dL    Bilirubin Total 0.3 0.2 - 1.3 mg/dL    Albumin 3.2 (L) 3.4 - 5.0 g/dL    Protein Total 7.0 6.8 - 8.8 g/dL    Alkaline Phosphatase 102 40 - 150 U/L    ALT 20 0 - 70 U/L    AST 12 0 - 45 U/L   CBC with platelets differential   Result Value Ref Range    WBC 6.0 4.0 - 11.0 10e9/L    RBC Count 3.17 (L) 4.4 - 5.9 10e12/L    Hemoglobin 9.6 (L) 13.3 - 17.7 g/dL    Hematocrit 30.5 (L) 40.0 - 53.0 %    MCV 96 78 - 100 fl    MCH 30.3 26.5 - 33.0 pg    MCHC 31.5 31.5 - 36.5 g/dL    RDW 14.1 10.0 - 15.0 %    Platelet Count 180 150 - 450 10e9/L    Diff Method Automated Method     % Neutrophils 71.3 %    % Lymphocytes 12.5 %    % Monocytes 11.0 %    % Eosinophils 4.8 %    % Basophils 0.2 %    % Immature Granulocytes 0.2 %    Nucleated RBCs 0 0 /100    Absolute Neutrophil 4.3 1.6 - 8.3 10e9/L    Absolute Lymphocytes 0.8 0.8 - 5.3 10e9/L    Absolute Monocytes 0.7 0.0 - 1.3 10e9/L    Absolute Eosinophils 0.3 0.0 - 0.7 10e9/L    Absolute Basophils 0.0 0.0 - 0.2 10e9/L    Abs Immature Granulocytes 0.0 0 - 0.4 10e9/L    Absolute Nucleated RBC 0.0    Basic metabolic panel   Result Value Ref Range    Sodium 144 133 -  144 mmol/L    Potassium 4.4 3.4 - 5.3 mmol/L    Chloride 113 (H) 94 - 109 mmol/L    Carbon Dioxide 25 20 - 32 mmol/L    Anion Gap 6 3 - 14 mmol/L    Glucose 83 70 - 99 mg/dL    Urea Nitrogen 28 7 - 30 mg/dL    Creatinine 1.12 0.66 - 1.25 mg/dL    GFR Estimate 63 >60 mL/min/1.7m2    GFR Estimate If Black 76 >60 mL/min/1.7m2    Calcium 8.2 (L) 8.5 - 10.1 mg/dL   INR   Result Value Ref Range    INR 3.31 (H) 0.86 - 1.14   Blood culture   Result Value Ref Range    Specimen Description Blood Right Arm     Culture Micro No growth after 15 hours    Gram stain   Result Value Ref Range    Specimen Description Left Leg Lower Wound     Special Requests Specimen collected in eSwab transport (white cap)     Gram Stain Rare  Gram positive cocci   (A)     Gram Stain Rare  Gram positive rods   (A)     Gram Stain Few  WBC'S seen  predominantly PMN's      Wound Culture Aerobic Bacterial   Result Value Ref Range    Specimen Description Left Leg Lower Wound     Special Requests Specimen collected in eSwab transport (white cap)     Culture Micro Culture in progress      *Note: Due to a large number of results and/or encounters for the requested time period, some results have not been displayed. A complete set of results can be found in Results Review.      Recent Results (from the past 48 hour(s))   Tib/Fib XR, left    Narrative    XR TIBIA & FIBULA LT 2 VW 11/4/2017 1:00 PM    History: left leg wound    Comparison: 6/17/2014.    Findings: Extensive vascular calcifications. No fracture. No bony  change to suggest osteomyelitis. Bandage and soft tissue defect over  the anterior shin without evidence for underlying bony abnormality.  Osteoarthritis in the knee.      Impression    Impression: No acute bony abnormality. Extensive vascular  calcifications.    YOLANDA BIRD                Pending Results:   Unresulted Labs Ordered in the Past 30 Days of this Admission     Date and Time Order Name Status Description    11/4/2017 1146 Wound  Culture Aerobic Bacterial Preliminary     11/4/2017 1146 Blood culture Preliminary                   Discharge Instructions and Follow-Up:     Discharge Procedure Orders  INR   Standing Status: Future  Standing Exp. Date: 01/04/18     Reason for your hospital stay   Order Comments: Left leg infection     Adult [facility] Follow-up and recommended labs and tests   Order Comments: Follow up with primary care provider, Roberto Sarmiento, within 7 days for hospital follow- up.  Please have your INR drawn on Monday  Appointments on [place] and/or [facility] (with [facility] or [facility] provider or service). Call [phone] if you haven't heard regarding these appointments within 7 days of discharge.     Activity   Order Comments: Your activity upon discharge: activity as tolerated, limit movement of left leg for the next week   Order Specific Question Answer Comments   Is discharge order? Yes      When to contact your care team   Order Comments: Return to the ER if you have worsening pain, redness, swelling, or fever     Wound care and dressings   Order Comments: Instructions to care for your wound at home: apply bacitracin ointment to open wound twice per day, then cover with non adherent guaze and wrap loosely with kerlix     Full Code     Diet   Order Comments: Follow this diet upon discharge: Orders Placed This Encounter     Low Saturated Fat Na <2400 mg   Order Specific Question Answer Comments   Is discharge order? Yes             Attestation:  Barbara Posey.  APRN, CNP

## 2017-11-05 NOTE — PROGRESS NOTES
Problem: Patient Care Overview  Goal: Plan of Care/Patient Progress Review  Outpatient/Observation goals to be met before discharge home:      List all goals to be met before discharge home:   - Tolerating oral antibiotics or has plans for home infusion set up - NO, IV Vanco given  - Vital signs normal or at patient baseline - YES  - Adequate pain control on oral analgesia - YES, denies pain  - Infection is improving - NO, left leg marked upon arrival on unit  - Color, warmth, movement, sensation of affected area or limb is intact or at baseline - NO, bilateral LE erythremic, warm to touch  - Return to baseline functional status - NO, generalized weak, slightly unsteady  - Safe disposition plan has been identified - NO  - Nurse to notify provider when observation goals have been met and patient is ready for discharge

## 2017-11-05 NOTE — PROGRESS NOTES
Discharge instruction reviewed with patient and Rica Levine, significant other and caretaker.  Patient verbalized understanding. PIV removed, Medications being picked up by family. Transport called for w/c to the main lobby. Family awaiting at main lobby. Patient discharged with all belongings.

## 2017-11-05 NOTE — PLAN OF CARE
Problem: Patient Care Overview  Goal: Plan of Care/Patient Progress Review  Outpatient/Observation goals to be met before discharge home:      List all goals to be met before discharge home:   - Tolerating oral antibiotics or has plans for home infusion set up - NO,  IV Vanco given at midnight  - Vital signs normal or at patient baseline - YES  - Adequate pain control on oral analgesia - YES, denies pain  - Infection is improving - NO, left leg marked upon arrival on unit, redressed with gauze dressing UTV at this time  - Color, warmth, movement, sensation of affected area or limb is intact or at baseline - NO, bilateral LE erythremic, warm to touch  - Return to baseline functional status - YES  - Safe disposition plan has been identified - NO  - Nurse to notify provider when observation goals have been met and patient is ready for discharge

## 2017-11-05 NOTE — PROGRESS NOTES
Social Work: Assessment with Discharge Plan    Patient Name:  Noe Machado  :  1935  Age:  82 year old  MRN:  [id number]  Risk/Complexity Score:     Completed assessment with:  Pt and chart review    Presenting Information   Reason for Referral:  Discharge plan  Date of Intake:  2017  Referral Source:  Chart Review  Decision Maker:  Pt  Alternate Decision Maker:   Rica is [facility] 1: [phone]; See son is [facility] 2: [phone]  Health Care Directive:  Copy in Chart; SW verified accurate  Living Situation:  House  Previous Functional Status:  Independent  Patient and family understanding of hospitalization:  Pt states he is here for wound on his leg that was draining fluid  Cultural/Language/Spiritual Considerations:  English speaking, Confucianist  Adjustment to Illness:  Pt is pleasant and patiently awaiting admission    Physical Health  Reason for Admission:    1. Left leg cellulitis      Services Needed/Recommended:  Home with no services    Mental Health/Chemical Dependency  Diagnosis:  None identified  Support/Services in Place:  None identified.  Services Needed/Recommended:  None identified.    Support System  Significant relationship at present time:  Pt lives with  Rica and son lives blocks away  Family of origin is available for support:  Yes  Other support available:  Pt reports to having friends he keeps in touch with  Gaps in support system:  None identified.  Patient is caregiver to:  None     Provider Information   Primary Care Physician:  Roberto Sarmiento   [phone]   Clinic:  [address] [place] [address] / [facility] [address]      :  None    Financial   Income Source:  Retired  Financial Concerns:  None identified.  Insurance:    Payor/Plan Subscriber Name Rel Member # Group #   UC[employer] - [employer] SENIORS* NOE MACHADO  [id number]       [id number]       Discharge Plan   Patient and family discharge goal:  To return home  post-admission  Provided education on discharge plan:  N/A  Patient agreeable to discharge plan:  YES  A list of Medicare Certified Facilities was provided to the patient and/or family to encourage patient choice. Patient's choices for facility are:  N/A  Will NH provide Skilled rehabilitation or complex medical:  NO  General information regarding anticipated insurance coverage and possible out of pocket cost was discussed. Patient and patient's family are aware patient may incur the cost of transportation to the facility, pending insurance payment: N/A  Barriers to discharge:  Pending medical clearance    Discharge Recommendations   Anticipated Disposition:  Home, no needs identified  Transportation Needs:  Family:   Rica or pt will take public transit or a Lyft  Name of Transportation Company and Phone:  Pt is well-versed in public transit and may want to try Lyft over cab due to recent positive experience.    Additional comments   Sha presents to the ED for a wound on his leg. Pt is active, involved in the community, and very independent. He will be ok to return home upon discharge. No SW concerns at this time. SW offered Senior Linkage Line packet if further needs arise.    Neeru TURNER, LGACACIA  ED   ED Pager: [phone]  On-call/After hours Pager: [phone]

## 2017-11-05 NOTE — PROGRESS NOTES
"SPIRITUAL HEALTH SERVICES  SPIRITUAL ASSESSMENT Progress Note  [facility] 6D     REFERRAL SOURCE: Hospital  Request    Met with Noe to offer spiritual and emotional support.  Noe said he \"has been struggling with the current political climate.\"  Noe and I discussed different areas of politics that he has been struggling with and made connections to his life today and his august.  Noe identifies as Denominational and likes to \"read the Magnificat.\"      PLAN: spiritual health remains available upon pt request     Shirley Hernandez  Chaplain Resident  Pager [phone]    "

## 2017-11-05 NOTE — DISCHARGE INSTRUCTIONS
PLEASE HOLD YOUR COUMADIN/WARFARIN TODAY (11/5), TOMORROW AM TAKE 2.5 MG INSTEAD OF YOUR USUAL 5 MG AND GO TO THE CLINIC TO HAVE YOUR INR CHECKED TOMORROW.

## 2017-11-05 NOTE — PROGRESS NOTES
Problem: Patient Care Overview  Goal: Plan of Care/Patient Progress Review  Outpatient/Observation goals to be met before discharge home:      List all goals to be met before discharge home:   - Tolerating oral antibiotics or has plans for home infusion set up - NO, Unasyn and Vanco  - Vital signs normal or at patient baseline - No, /82  - Adequate pain control on oral analgesia - YES, denies pain  - Infection is improving - NO, left leg marked, bacitracin applied.  - Color, warmth, movement, sensation of affected area or limb is intact or at baseline - NO, bilateral LE erythremic, warm to touch  - Return to baseline functional status - NO, generalized weak.  - Safe disposition plan has been identified - NO  - Nurse to notify provider when observation goals have been met and patient is ready for discharge

## 2017-11-05 NOTE — PROGRESS NOTES
Dressing to RLE removed and redressed with Bacitracin, non-adherent gauze, wrapped with kerlix and secured with mephix tape. Education on dressing change done and demonstrated in presence of Caregiver Rica Swann. Discharge instruction reviewed.  Patient verbalized understanding. PIV removed. Rica to  medications at D/C pharmacy and bring around car. Transport called for wc to the main lobby. Family awaiting at main lobby. Patient discharged with all belongings, supplies for dressing change sent.

## 2017-11-05 NOTE — PROGRESS NOTES
Problem: Patient Care Overview  Goal: Plan of Care/Patient Progress Review  Outpatient/Observation goals to be met before discharge home:      List all goals to be met before discharge home:   - Tolerating oral antibiotics or has plans for home infusion set up - NO,  IV Unisyn, IV Vanco  - Vital signs normal or at patient baseline - YES, BP improved 126/69  - Adequate pain control on oral analgesia - YES, denies pain  - Infection is improving - NO, left leg marked upon arrival on unit  - Color, warmth, movement, sensation of affected area or limb is intact or at baseline - NO, bilateral LE erythremic, warm to touch  - Return to baseline functional status - NO, generalized weak  - Safe disposition plan has been identified - NO  - Nurse to notify provider when observation goals have been met and patient is ready for discharge

## 2017-11-06 ENCOUNTER — ANTICOAGULATION THERAPY VISIT (OUTPATIENT)
Dept: ANTICOAGULATION | Facility: CLINIC | Age: 82
End: 2017-11-06

## 2017-11-06 DIAGNOSIS — Z79.01 LONG-TERM (CURRENT) USE OF ANTICOAGULANTS: ICD-10-CM

## 2017-11-06 DIAGNOSIS — I48.91 ATRIAL FIBRILLATION, UNSPECIFIED TYPE (H): ICD-10-CM

## 2017-11-06 LAB
BACTERIA SPEC CULT: ABNORMAL
BACTERIA SPEC CULT: ABNORMAL
INR PPP: 3.11 (ref 0.86–1.14)
Lab: ABNORMAL
SPECIMEN SOURCE: ABNORMAL

## 2017-11-06 NOTE — MR AVS SNAPSHOT
Noe Florence   11/6/2017   Anticoagulation Therapy Visit    Description:  82 year old male   Provider:  Nguyen Zuniga, RN   Department:  [facility]           INR as of 11/6/2017     Today's INR       Anticoagulation Summary as of 11/6/2017     INR goal 2.0-3.0   Today's INR    Full instructions 5 mg every day   Next INR check 11/6/2017    Indications   Atrial fibrillation (H) [I48.91] [I48.91]  Long-term (current) use of anticoagulants [Z79.01] [Z79.01]         November 2017 Details    Sun Mon Tue Wed Thu Fri Sat        1               2               3               4                 5               6      See details      7               8               9               10               11                 12               13               14               15               16               17               18                 19               20               21               22               23               24               25                 26               27               28               29               30                  Date Details   11/06 This INR check       Date of next INR:  11/6/2017         How to take your warfarin dose     To take:  5 mg Take 1 of the 5 mg tablets.

## 2017-11-06 NOTE — PROGRESS NOTES
ANTICOAGULATION FOLLOW-UP CLINIC VISIT    Patient Name:  Noe Florence  Date:  11/6/2017  Contact Type:  Telephone    SUBJECTIVE:     Patient Findings     Positives Unexplained INR or factor level change    Comments Left message for patient to take 2.5mg today and Wednesday.  Suspect course of Bactrim is increasing INR result.  Check on Thursday.           OBJECTIVE    INR   Date Value Ref Range Status   11/06/2017 3.11 (H) 0.86 - 1.14 Final       ASSESSMENT / PLAN  INR assessment SUPRA    Recheck INR In: 3 DAYS    INR Location Clinic      Anticoagulation Summary as of 11/6/2017     INR goal 2.0-3.0   Today's INR 3.11!   Maintenance plan 5 mg (5 mg x 1) every day   Full instructions 11/6: 2.5 mg; 11/8: 2.5 mg; Otherwise 5 mg every day   Weekly total 35 mg   Plan last modified Roya Manning RN (8/30/2017)   Next INR check 11/9/2017   Priority INR   Target end date Indefinite    Indications   Atrial fibrillation (H) [I48.91] [I48.91]  Long-term (current) use of anticoagulants [Z79.01] [Z79.01]         Anticoagulation Episode Summary     INR check location     Preferred lab     Send INR reminders to [facility]    Comments Patient contact number: [phone]   Can leave results with Rica (friend)      Anticoagulation Care Providers     Provider Role Specialty Phone number    Deedee Baird MD Responsible Cardiology [phone]            See the Encounter Report to view Anticoagulation Flowsheet and Dosing Calendar (Go to Encounters tab in chart review, and find the Anticoagulation Therapy Visit)    Left message for patient with results and dosing recommendations. Asked patient to call back to report any missed doses, falls, signs and symptoms of bleeding or clotting, any changes in health, medication, or diet. Asked patient to call back with any questions or concerns.    Ciro Sharp, RN        Bill called back on 11/7 to report that he already took his 5mg dose of warfarin yesterday when he  got our message.  Sha will take warfarin 2.5mg on 11/7 and 11/8.

## 2017-11-06 NOTE — MR AVS SNAPSHOT
Noe Florence   11/6/2017   Anticoagulation Therapy Visit    Description:  82 year old male   Provider:  Ciro Sharp RN   Department:  [facility]           INR as of 11/6/2017     Today's INR 3.11!      Anticoagulation Summary as of 11/6/2017     INR goal 2.0-3.0   Today's INR 3.11!   Full instructions 11/6: 2.5 mg; 11/8: 2.5 mg; Otherwise 5 mg every day   Next INR check 11/9/2017    Indications   Atrial fibrillation (H) [I48.91] [I48.91]  Long-term (current) use of anticoagulants [Z79.01] [Z79.01]         November 2017 Details    Sun Mon Tue Wed Thu Fri Sat        1               2               3               4                 5               6      2.5 mg   See details      7      5 mg         8      2.5 mg         9            10               11                 12               13               14               15               16               17               18                 19               20               21               22               23               24               25                 26               27               28               29               30                  Date Details   11/06 This INR check       Date of next INR:  11/9/2017         How to take your warfarin dose     To take:  2.5 mg Take 0.5 of a 5 mg tablet.    To take:  5 mg Take 1 of the 5 mg tablets.

## 2017-11-06 NOTE — PROGRESS NOTES
Dates of hospitalization: 11/4/17 to 11/5/17  Reason for hospitalization: Left leg infection    Procedures performed: None  Vitamin K or FFP administered? None  Inpatient warfarin doses added to calendar? Yes  Medication changes at discharge: Bacitracin ointment BID, Keflex 500 BID X 7 days, and Bactrim BID X 7 Days  Warfarin dosing after DC: Held 11/5/17  Patient discharged on Lovenox? No  Next INR date: Monday 11/6/17  Where is the patient discharging to? (home, TCU, staying locally, etc.): Home with friend Rica  Will patient have home care? No    Pt reports that he is going to be coming in later this afternoon to get an INR. Pt and friend Rica is aware of Bactrim interacting with Warfarin

## 2017-11-09 ENCOUNTER — ANTICOAGULATION THERAPY VISIT (OUTPATIENT)
Dept: ANTICOAGULATION | Facility: CLINIC | Age: 82
End: 2017-11-09

## 2017-11-09 ENCOUNTER — TELEPHONE (OUTPATIENT)
Dept: INTERNAL MEDICINE | Facility: CLINIC | Age: 82
End: 2017-11-09

## 2017-11-09 DIAGNOSIS — I48.91 ATRIAL FIBRILLATION (H): ICD-10-CM

## 2017-11-09 DIAGNOSIS — I48.91 ATRIAL FIBRILLATION, UNSPECIFIED TYPE (H): ICD-10-CM

## 2017-11-09 DIAGNOSIS — Z79.01 LONG-TERM (CURRENT) USE OF ANTICOAGULANTS: ICD-10-CM

## 2017-11-09 LAB — INR PPP: 3.21 (ref 0.86–1.14)

## 2017-11-09 NOTE — MR AVS SNAPSHOT
Noe Florence   11/9/2017   Anticoagulation Therapy Visit    Description:  82 year old male   Provider:  Mark Ho, [facility]   Department:  [facility]           INR as of 11/9/2017     Today's INR 3.21!      Anticoagulation Summary as of 11/9/2017     INR goal 2.0-3.0   Today's INR 3.21!   Full instructions 11/9: 2.5 mg; 11/10: 2.5 mg; 11/11: 5 mg; 11/12: 5 mg   Next INR check 11/13/2017    Indications   Atrial fibrillation (H) [I48.91] [I48.91]  Long-term (current) use of anticoagulants [Z79.01] [Z79.01]         November 2017 Details    Sun Mon Tue Wed Thu Fri Sat        1               2               3               4                 5               6               7               8               9      2.5 mg   See details      10      2.5 mg         11      5 mg           12      5 mg         13            14               15               16               17               18                 19               20               21               22               23               24               25                 26               27               28               29               30                  Date Details   11/09 This INR check       Date of next INR:  11/13/2017         How to take your warfarin dose     To take:  2.5 mg Take 0.5 of a 5 mg tablet.    To take:  5 mg Take 1 of the 5 mg tablets.

## 2017-11-09 NOTE — PROGRESS NOTES
ANTICOAGULATION FOLLOW-UP CLINIC VISIT    Patient Name:  Noe Florence  Date:  11/9/2017  Contact Type:  Telephone    SUBJECTIVE:     Patient Findings     Comments Last day of Bactrim will be on Sat 11/11/17.  No signs or symptoms of bruising or bleeding           OBJECTIVE    INR   Date Value Ref Range Status   11/09/2017 3.21 (H) 0.86 - 1.14 Final       ASSESSMENT / PLAN  INR assessment SUPRA    Recheck INR In: 5 DAYS    INR Location Clinic      Anticoagulation Summary as of 11/9/2017     INR goal 2.0-3.0   Today's INR 3.21!   Maintenance plan No maintenance plan   Full instructions 11/9: 2.5 mg; 11/10: 2.5 mg; 11/11: 5 mg; 11/12: 5 mg   Plan last modified Mark Ho RP (11/9/2017)   Next INR check 11/13/2017   Priority INR   Target end date Indefinite    Indications   Atrial fibrillation (H) [I48.91] [I48.91]  Long-term (current) use of anticoagulants [Z79.01] [Z79.01]         Anticoagulation Episode Summary     INR check location     Preferred lab     Send INR reminders to [facility] CL[facility]    Comments Patient contact number: [phone]   Can leave results with Rica (friend)      Anticoagulation Care Providers     Provider Role Specialty Phone number    Deedee Baird MD Responsible Cardiology [phone]            See the Encounter Report to view Anticoagulation Flowsheet and Dosing Calendar (Go to Encounters tab in chart review, and find the Anticoagulation Therapy Visit)    Spoke with patient. Gave them their lab results and new warfarin recommendation.  No missed doses, no falls. No signs or symptoms of bleed or clotting.    Mark Ho RPH

## 2017-11-09 NOTE — TELEPHONE ENCOUNTER
I received a message that pt would like sooner hospital follow-up appt with PCP than provider has available. I left a voicemail message for pt today. Advised that pt schedule appt with any other provider in primary care clinic as PCP does not have any availability until December. Pt should also schedule annual physical with provider.    Ximena Vasquez RN

## 2017-11-10 LAB
BACTERIA SPEC CULT: NO GROWTH
SPECIMEN SOURCE: NORMAL

## 2017-11-13 ENCOUNTER — OFFICE VISIT (OUTPATIENT)
Dept: INTERNAL MEDICINE | Facility: CLINIC | Age: 82
End: 2017-11-13

## 2017-11-13 ENCOUNTER — ANTICOAGULATION THERAPY VISIT (OUTPATIENT)
Dept: ANTICOAGULATION | Facility: CLINIC | Age: 82
End: 2017-11-13

## 2017-11-13 VITALS
DIASTOLIC BLOOD PRESSURE: 67 MMHG | HEART RATE: 67 BPM | WEIGHT: 152.7 LBS | SYSTOLIC BLOOD PRESSURE: 122 MMHG | RESPIRATION RATE: 16 BRPM | BODY MASS INDEX: 23.92 KG/M2

## 2017-11-13 DIAGNOSIS — I99.8 VASCULAR CALCIFICATION: ICD-10-CM

## 2017-11-13 DIAGNOSIS — Z79.01 LONG-TERM (CURRENT) USE OF ANTICOAGULANTS: ICD-10-CM

## 2017-11-13 DIAGNOSIS — L08.9 LOCAL INFECTION OF WOUND: ICD-10-CM

## 2017-11-13 DIAGNOSIS — Z09 HOSPITAL DISCHARGE FOLLOW-UP: Primary | ICD-10-CM

## 2017-11-13 DIAGNOSIS — T14.8XXA LOCAL INFECTION OF WOUND: ICD-10-CM

## 2017-11-13 DIAGNOSIS — I87.2 STASIS DERMATITIS OF BOTH LEGS: ICD-10-CM

## 2017-11-13 DIAGNOSIS — I48.91 ATRIAL FIBRILLATION, UNSPECIFIED TYPE (H): ICD-10-CM

## 2017-11-13 DIAGNOSIS — I87.8 VENOUS STASIS: ICD-10-CM

## 2017-11-13 DIAGNOSIS — I48.91 ATRIAL FIBRILLATION (H): ICD-10-CM

## 2017-11-13 DIAGNOSIS — R21 RASH: ICD-10-CM

## 2017-11-13 LAB — INR PPP: 2.8 (ref 0.86–1.14)

## 2017-11-13 ASSESSMENT — PAIN SCALES - GENERAL: PAINLEVEL: NO PAIN (0)

## 2017-11-13 NOTE — MR AVS SNAPSHOT
After Visit Summary   11/13/2017    Noe Florence    MRN: [id number]           Patient Information     Date Of Birth          1935        Visit Information        Provider Department      11/13/2017 2:40 PM Clariec Magallanes APRN [facility]        Today's Diagnoses     Hospital discharge follow-up    -  1    Local infection of wound        Stasis dermatitis of both legs        Venous stasis        Rash          Care Instructions    Primary Care Center: [phone]     Primary Care Center Medication Refill Request Information:  * Please contact your pharmacy regarding ANY request for medication refills.  ** [facility] Prescription Fax = [phone]  * Please allow 3 business days for routine medication refills.  * Please allow 5 business days for controlled substance medication refills.     Primary Care Center Test Result notification information:  *You will be notified with in 7-10 days of your appointment day regarding the results of your test.  If you are on MyChart you will be notified as soon as the provider has reviewed the results and signed off on them.            Follow-ups after your visit        Additional Services     DERMATOLOGY REFERRAL       Your provider has referred you to: [facility]: Dermatology Clinic [facility] [phone]   [email or web address]  N: [facility] [phone]  [email or web address]    Please be aware that coverage of these services is subject to the terms and limitations of your health insurance plan.  Call member services at your health plan with any benefit or coverage questions.      Please bring the following with you to your appointment:    (1) Any X-Rays, CTs or MRIs which have been performed.  Contact the facility where they were done to arrange for  prior to your scheduled appointment.    (2) List of current medications  (3) This referral  "request   (4) Any documents/labs given to you for this referral            VASCULAR MEDICINE REFERRAL ([facility])       PAD without symptoms - order lipid panel and \"US DELFINO Doppler no exercise\" ([id number])  PAD with claudication - order \"US DELFINO Doppler with exercise\" ([id number]), \"US aorta/ivc/iliac duplex complete\" ([id number]) and \"US lower extremity arterial duplex bilateral\" ([id number])  PAD with critical limb ischemia - resting DELFINO and toe-brachial index (refer to vascular medicine for triage of testing)  Varicose veins/edema - \"US lower extremity venous duplex bilateral\" ([id number])  [Number to call to schedule: [phone]. Vascular medicine providers are: Alberto Torres Godishala]            WOUND CARE REFERRAL       Your provider has referred you to: [facility] Wound Ostomy - [place] [phone]   [email or web address]    Reason for referral: Wound care      1. [facility] Wound Consult appointment is related to what kind of wound: Venous leg/foot ulcer    2. Location of wound: Lower extremity    3. Reason for referral: Assess and treat as indicated    4. Desired treatment if any: as indicated     Please be aware that coverage of these services is subject to the terms and limitations of your health insurance plan.  Call member services at your health plan with any benefit or coverage questions.      Please bring the following with you to your appointment:    (1) Any X-Rays, CTs or MRIs which have been performed.  Contact the facility where they were done to arrange for  prior to your scheduled appointment.    (2) List of current medications   (3) This referral request   (4) Any documents/labs given to you for this referral                  Your next 10 appointments already scheduled     Nov 13, 2017  4:45 PM CST   LAB with  LAB   [facility] ([facility] [place])    [address] [facility]  [address] [facility] [address] [phone]           " Please do not eat 10-12 hours before your appointment if you are coming in fasting for labs on lipids, cholesterol, or glucose (sugar). This does not apply to pregnant women. Water, hot tea and black coffee (with nothing added) are okay. Do not drink other fluids, diet soda or chew gum.            Nov 16, 2017  7:30 AM CST   (Arrive by 7:15 AM)   New Patient Visit with Liz Thomson PA-C   [facility] ([facility])    [address] [place] [address]  [address] [facility] [address]   [phone]            Dec 04, 2017 11:00 AM CST   (Arrive by 10:45 AM)   Implanted Defibulator with  Cv Device 1   [facility] ([facility])    [address] [place] [address]  [address]rd [facility] [address] [phone]            Dec 04, 2017 11:40 AM CST   (Arrive by 11:25 AM)   RETURN ARRHYTHMIA with Deedee Baird MD   [facility] ([facility])    [address] [place] [address]  [address] [facility] [address]   [phone]            Dec 04, 2017 12:30 PM CST   LAB with  LAB    [facility] ([facility])    [address] [place] [address]  [address] [facility] [address] [phone]           Please do not eat 10-12 hours before your appointment if you are coming in fasting for labs on lipids, cholesterol, or glucose (sugar). This does not apply to pregnant women. Water, hot tea and black coffee (with nothing added) are okay. Do not drink other fluids, diet soda or chew gum.            Dec 05, 2017  4:00 PM CST   (Arrive by 3:45 PM)   New Patient Visit with Frida Desai MD   [facility] ([facility])    [address] [place] [address]  [address] [facility] [address]   [phone]            Quentin 15, 2018  9:40 AM CST   (Arrive by 9:25 AM)   PHYSICAL with Roberto Sarmiento MD   [facility] ([facility])    [address] [place] [address]  [facility] [address]   [phone]            Feb 12, 2018  3:30 PM CST   (Arrive by 3:15 PM)   Return Visit with Lashawn Jackson MD   [facility] ([facility])    [address] [place] [address]  3rd [facility] [address]   [phone]            Mar 30, 2018 11:15 AM CDT   LAB with  LAB   [facility] ([facility])    [address] [place] [address]   [phone]           Please do not eat 10-12 hours before your appointment if you are coming in fasting for labs on lipids, cholesterol, or glucose (sugar). This does not apply to pregnant women. Water, hot tea and black coffee (with nothing added) are okay. Do not drink other fluids, diet soda or chew gum.            Mar 30, 2018 11:40 AM CDT   (Arrive by 11:25 AM)   CT CHEST/ABDOMEN/PELVIS W CONTRAST with UCCT2   [facility] ([facility])    [address] [place] [address]   [phone]           Please bring any scans or X-rays taken at other hospitals, if similar tests were done. Also bring a list of your medicines, including vitamins, minerals and over-the-counter drugs. It is safest to leave personal items at home.  Be sure to tell your doctor:   If you have any allergies.   If there s any chance you are pregnant.   If you are breastfeeding.   If you have any special needs.  You may have contrast for this exam. To prepare:   Do not eat or drink for 2 hours before your exam. If you need to take medicine, you may take it with small sips of water. (We may ask you to take liquid medicine as well.)   The day before your exam, drink extra fluids at least six 8-ounce glasses (unless your doctor tells you to restrict your fluids).  Patients over 70 or patients with diabetes or kidney problems:   If you haven t had a blood test (creatinine test) within the last 30 days, go to your clinic or  Diagnostic Imaging Department for this test.  If you have diabetes:   If your kidney function is normal, continue taking your metformin (Avandamet, Glucophage, Glucovance, Metaglip) on the day of your exam.   If your kidney function is abnormal, wait 48 hours before restarting this medicine.  You will have oral contrast for this exam:   You will drink the contrast at home. Get this from your clinic or Diagnostic Imaging Department. Please follow the directions given.  Please wear loose clothing, such as a sweat suit or jogging clothes. Avoid snaps, zippers and other metal. We may ask you to undress and put on a hospital gown.  If you have any questions, please call the Imaging Department where you will have your exam.              Future tests that were ordered for you today     Open Future Orders        Priority Expected Expires Ordered    US Lower Extremity Arterial Duplex Bilateral Routine  11/13/2018 11/13/2017            Who to contact     Please call your clinic at [phone] to:    Ask questions about your health    Make or cancel appointments    Discuss your medicines    Learn about your test results    Speak to your doctor   If you have compliments or concerns about an experience at your clinic, or if you wish to file a complaint, please contact [facility] Patient Relations at [phone] or email us at [email or web address]         Additional Information About Your Visit        [employer] Information     [employer] gives you secure access to your electronic health record. If you see a primary care provider, you can also send messages to your care team and make appointments. If you have questions, please call your primary care clinic.  If you do not have a primary care provider, please call [phone] and they will assist you.      [employer] is an electronic gateway that provides easy, online access to your medical records. With [employer], you can request a clinic  appointment, read your test results, renew a prescription or communicate with your care team.     To access your existing account, please contact your [facility] or call [phone] for assistance.        Care EveryWhere ID     This is your Care EveryWhere ID. This could be used by other organizations to access your [facility] medical records  [id number]        Your Vitals Were     Pulse Respirations BMI (Body Mass Index)             67 16 23.92 kg/m2          Blood Pressure from Last 3 Encounters:   11/13/17 122/67   11/05/17 110/57   10/03/17 119/69    Weight from Last 3 Encounters:   11/13/17 69.3 kg (152 lb 11.2 oz)   11/04/17 68.5 kg (151 lb 2 oz)   10/03/17 69.2 kg (152 lb 9.6 oz)              We Performed the Following     DERMATOLOGY REFERRAL     VASCULAR MEDICINE REFERRAL ([facility])     WOUND CARE REFERRAL        Primary Care Provider Office Phone # Fax #    Roberto Sarmiento [phone]       [address] [place] [address]        Equal Access to Services     [facility]: Hadii aad ku hadasho Solali, waaxda luqadaha, qaybta kaalmada adeegyavalerie, sil burden . So [facility] [phone].    ATENCIÓN: Si habla español, tiene a aguirre disposición servicios gratuitos de asistencia lingüística. Llame al [phone].    We comply with applicable federal civil rights laws and [place] laws. We do not discriminate on the basis of race, color, national origin, age, disability, sex, sexual orientation, or gender identity.            Thank you!     Thank you for choosing [facility]IMARY CARE CLINIC  for your care. Our goal is always to provide you with excellent care. Hearing back from our patients is one way we can continue to improve our services. Please take a few minutes to complete the written survey that you may receive in the mail after your visit with us. Thank you!             Your Updated Medication List - Protect  others around you: Learn how to safely use, store and throw away your medicines at www.disposemymeds.org.          This list is accurate as of: 11/13/17  4:25 PM.  Always use your most recent med list.                   Brand Name Dispense Instructions for use Diagnosis    aspirin 81 MG tablet      Take 1 tablet by mouth daily.        atorvastatin 40 MG tablet    LIPITOR    90 tablet    Take 1 tablet (40 mg) by mouth daily    Hyperlipidemia, unspecified hyperlipidemia type       bacitracin ointment     28 g    Apply topically 2 times daily APPLY TO LEFT LEG TWO TIMES PER DAY    Left leg cellulitis       Blood Pressure Monitor Kit     1 kit    1 Units daily    Hypertension       CENTRUM SILVER per tablet      Take 1 tablet by mouth daily. AM        ciclopirox 0.77 % cream    LOPROX    90 g    Apply topically 2 times daily To feet and toenails.    Dermatophytosis of nail, Tinea pedis of both feet       cyanocobalamin 1000 MCG tablet    vitamin  B-12    180 tablet    Take 2 tablets (2,000 mcg) by mouth daily    Anemia       doxazosin 2 MG tablet    CARDURA    90 tablet    Take 1/2 tab in the morning and 1/2 tab in the evening    Essential hypertension       fluticasone 50 MCG/ACT spray    FLONASE    3 Package    Spray 2 sprays into both nostrils daily    Unspecified sinusitis (chronic)       ketoconazole 2 % cream    NIZORAL    60 g    Apply topically daily On hold    Tinea corporis       loratadine 10 MG tablet    CLARITIN     Take 10 mg by mouth as needed        metoprolol 25 MG tablet    LOPRESSOR    180 tablet    Take 1 tablet (25 mg) by mouth 2 times daily    Coronary artery disease involving native heart without angina pectoris, unspecified vessel or lesion type       mirtazapine 15 MG tablet    REMERON     Take 15 mg by mouth At Bedtime.        triamcinolone 0.1 % cream    KENALOG    80 g    Apply topically 2 times daily For up to two weeks in a row    Atopic eczema       warfarin 5 MG tablet    COUMADIN    35  tablet    7.5 mg on Wed; 5 mg all other days  or as instructed by coumadin clinic.    Atrial flutter (H)       ZOLOFT 100 MG tablet   Generic drug:  sertraline      Take 100 mg by mouth every evening.

## 2017-11-13 NOTE — PROGRESS NOTES
"Hospitalization Follow-up Visit         HPI         Summary of hospitalization:  [facility] discharge summary reviewed     Mr. Florenec was seen at [facility] ED on 11/4 for a wound in his left lower leg. He was discharged on 11/5 with a 1-week course of Bactrim and Keflex, with instructions to change wound dressings BID and apply bacitracin. Wound gram stain showed gram positive cocci and rods, blood culture with no growth. Tibia/fibula Xray showed no bony abnormality but extensive vascular calcifications.    The wound \"came out of nowhere\"--he did not injure the lower leg. It started as a \"huge blister,\" which popped and he pulled off the skin.    Since taking the antibiotics, the redness has improved. It is no longer draining or bleeding. He denies fevers. He is feeling well overall, eating and drinking normally. He missed a dose or two throughout the week, so he has one more day of antibiotics.     Diagnostic Tests/Treatments reviewed.  Follow up needed: none    Post Discharge Medication Reconciliation: discharge medications reconciled, continue medications without change.  Medications reviewed by: by myself  Problems taking medications regularly:  None  Problems adhering to non-medication therapy:  None    Other Healthcare Providers Involved in Patient s Care:         Specialist appointment - Cardiology with device check on 12/4  Update since discharge: improved.   Plan of care communicated with patient              Review of Systems:   ROS:   CONSTITUTIONAL: no fatigue, no unexpected change in weight  SKIN: see HPI; chronic erythema and peeling in bilateral lower extremities  EYES: no acute vision problems or changes  ENT: no ear problems, no mouth problems, no throat problems  RESP: no significant cough, no shortness of breath  CV: no chest pain, no palpitations, no new or worsening peripheral edema  GI: no nausea, no vomiting, no constipation, no diarrhea  MUSCULOSKELETAL: LLE edema improving, no " abnormalities         Physical Exam:     Vitals: /67  Pulse 67  Resp 16  Wt 69.3 kg (152 lb 11.2 oz)  BMI 23.92 kg/m2  BMI= Body mass index is 23.92 kg/(m^2).    GENERAL:: healthy, alert and no distress  RESP: lungs clear to auscultation - no rales, no rhonchi, no wheezes  CV: irregularly irregular rhythm, normal S1 S2, no S3 or S4, no murmur, no click or rub, DP/PT pulses 1+  MS: extremities- no gross deformities noted, 2+ edema in LLE  SKIN: 5 x 6 x0.3 cm wound over anterior LLE with pink wound base, no drainage or surrounding erythema; bilateral LE are mykel with dry flaking skin  NEURO: strength and tone- normal, sensory exam- grossly normal, mentation- intact, speech- normal         Results:   Results from hospital discharge reviewed    Assessment and Plan     Hospital discharge follow-up  Pt improved since discharge.    Local infection of wound  Improving. Wound was cleansed with Micro-klenz wound cleanser, patted dry with sterile gauze, and applied Adaptic non-adherent dressing covered with Gauze border dressing. Reviewed continuing with wound care at home. Advised completing full course of antibiotics and notify clinic if erythema, edema, drainage, or fever develop. Schedule appt with wound clinic to monitor for continued healing.  - WOUND CARE REFERRAL    Stasis dermatitis of both legs  Venous stasis  Rash  Pt's wife requests dermatology referral for full skin check. Recommended good moisturizer to lower extremities for dry skin to avoid breakdown or skin tears. Will obtain vascular studies and refer to Vascular Medicine given Xray findings of extensive vascular calcifications and slowly healing wound.  - DERMATOLOGY REFERRAL  - VASCULAR MEDICINE REFERRAL ([facility])  -  Lower Extremity Arterial Duplex Bilateral    Options for treatment and follow-up care were reviewed with the patient.  Noe Florence engaged in the decision making process and verbalized understanding of the options discussed and  agreed with the final plan.    MARIA LUISA Mathew CNP

## 2017-11-13 NOTE — MR AVS SNAPSHOT
Noe Florence   11/13/2017   Anticoagulation Therapy Visit    Description:  82 year old male   Provider:  Leena Abebe RN   Department:  [facility]           INR as of 11/13/2017     Today's INR 2.80      Anticoagulation Summary as of 11/13/2017     INR goal 2.0-3.0   Today's INR 2.80   Full instructions 11/13: 2.5 mg; 11/14: 5 mg; 11/15: 5 mg; 11/16: 5 mg; 11/17: 5 mg; 11/18: 5 mg; 11/19: 5 mg; 11/20: 2.5 mg; 11/21: 5 mg; 11/22: 5 mg; 11/23: 5 mg; 11/24: 5 mg; 11/25: 5 mg; 11/26: 5 mg   Next INR check 11/27/2017    Indications   Atrial fibrillation (H) [I48.91] [I48.91]  Long-term (current) use of anticoagulants [Z79.01] [Z79.01]         November 2017 Details    Sun Mon Tue [facility] Fri Sat        1               2               3               4                 5               6               7               8               9               10               11                 12               13      2.5 mg   See details      14      5 mg         15      5 mg         16      5 mg         17      5 mg         18      5 mg           19      5 mg         20      2.5 mg         21      5 mg         22      5 mg         23      5 mg         24      5 mg         25      5 mg           26      5 mg         27            28               29               30                  Date Details   11/13 This INR check       Date of next INR:  11/27/2017         How to take your warfarin dose     To take:  2.5 mg Take 0.5 of a 5 mg tablet.    To take:  5 mg Take 1 of the 5 mg tablets.

## 2017-11-13 NOTE — NURSING NOTE
Chief Complaint   Patient presents with     Hospital F/U     patient states he here for a hospital follow up     JOESPH RO at 2:52 PM on 11/13/2017.

## 2017-11-13 NOTE — PROGRESS NOTES
Rooming Note    Health Maintenance  Health Maintenance Due   Topic Date Due     ADVANCE DIRECTIVE PLANNING Q5 YRS  05/22/1990     OP ANNUAL INR REFERRAL  12/02/2016     FALL RISK ASSESSMENT  11/16/2017     LIPID MONITORING Q1 YEAR  11/16/2017   Patient declined to discuss HM.    Fall Risk  FALL RISK ASSESSMENT 11/13/2017   Fallen 2 or more times in the past year? No   Any fall with injury in the past year? No

## 2017-11-13 NOTE — PATIENT INSTRUCTIONS
[facility]: [phone]     [facility] Medication Refill Request Information:  * Please contact your pharmacy regarding ANY request for medication refills.  ** [facility] Prescription Fax = [phone]  * Please allow 3 business days for routine medication refills.  * Please allow 5 business days for controlled substance medication refills.     [facility] Test Result notification information:  *You will be notified with in 7-10 days of your appointment day regarding the results of your test.  If you are on MyChart you will be notified as soon as the provider has reviewed the results and signed off on them.

## 2017-11-13 NOTE — PROGRESS NOTES
ANTICOAGULATION FOLLOW-UP CLINIC VISIT    Patient Name:  Noe Florence  Date:  11/13/2017  Contact Type:  Telephone    SUBJECTIVE:        OBJECTIVE    INR   Date Value Ref Range Status   11/13/2017 2.80 (H) 0.86 - 1.14 Final       ASSESSMENT / PLAN  INR assessment THER    Recheck INR In: 2 WEEKS    INR Location Clinic      Anticoagulation Summary as of 11/13/2017     INR goal 2.0-3.0   Today's INR 2.80   Maintenance plan No maintenance plan   Full instructions 11/13: 2.5 mg; 11/14: 5 mg; 11/15: 5 mg; 11/16: 5 mg; 11/17: 5 mg; 11/18: 5 mg; 11/19: 5 mg; 11/20: 2.5 mg; 11/21: 5 mg; 11/22: 5 mg; 11/23: 5 mg; 11/24: 5 mg; 11/25: 5 mg; 11/26: 5 mg   Plan last modified Mark Ho, [facility] (11/9/2017)   Next INR check 11/27/2017   Priority INR   Target end date Indefinite    Indications   Atrial fibrillation (H) [I48.91] [I48.91]  Long-term (current) use of anticoagulants [Z79.01] [Z79.01]         Anticoagulation Episode Summary     INR check location     Preferred lab     Send INR reminders to [facility]    Comments Patient contact number: [phone]   Can leave results with Rica (friend)      Anticoagulation Care Providers     Provider Role Specialty Phone number    Deedee Baird MD Responsible Cardiology [phone]            See the Encounter Report to view Anticoagulation Flowsheet and Dosing Calendar (Go to Encounters tab in chart review, and find the Anticoagulation Therapy Visit)  Left message for patient with results and dosing recommendations. Asked patient to call back to report any missed doses, falls, signs and symptoms of bleeding or clotting, any changes in health, medication, or diet. Asked patient to call back with any questions or concerns.      Leena Abebe RN

## 2017-11-16 ENCOUNTER — OFFICE VISIT (OUTPATIENT)
Dept: WOUND CARE | Facility: CLINIC | Age: 82
End: 2017-11-16

## 2017-11-16 DIAGNOSIS — I73.9 PVD (PERIPHERAL VASCULAR DISEASE) (H): Primary | ICD-10-CM

## 2017-11-16 DIAGNOSIS — L97.921 ULCER OF LEFT LOWER EXTREMITY, LIMITED TO BREAKDOWN OF SKIN (H): ICD-10-CM

## 2017-11-16 NOTE — LETTER
11/16/2017       RE: Noe Florence  [address] [facility] [address]     Dear Colleague,    Thank you for referring your patient, Noe Florence, to the [facility] WOUND CARE at [facility]. Please see a copy of my visit note below.      Date of Service: 11/16/2017    Chief Complaint:   Chief Complaint   Patient presents with     Wound Check     wound care        HPI: Noe is a 82 year old male who presents today for further evaluation of left leg wound. He presents with his wife. They relate that a blister started about 2 weeks ago on the front of thel eft leg. This burst in the days after, and the skin sloughed off. They relate that he went to the ED with increased pain and redness going up his leg. He was discharged just over a week ago and was given a course of Keflex and Bactrim DS. He has finished those today. He relates that the area is not painful for him today. He is unsure of the underlying cause for the blister formation.     Review of Systems: No n/v/d/f/c/ns/sob/cp.    PMH:   Past Medical History:   Diagnosis Date     Advanced open-angle glaucoma      Anemia     post-op colectomy     Antiplatelet or antithrombotic long-term use      Arrhythmia      Arthritis     hands     Atrial fibrillation (H)      CKD (chronic kidney disease) stage 3, GFR 30-59 ml/min      CKD (chronic kidney disease), stage III 2005     Colon cancer (H)     Stage II-B colon cancer     Coronary artery disease     s/p CABG x 2, JEREMY x 2     Diverticulitis      History of blood transfusion      Hyperlipidemia      Hypertension      Ischemic cardiomyopathy      MGUS (monoclonal gammopathy of unknown significance)      Nonsenile cataract     BE     Pacemaker      Polymorphic ventricular tachycardia (H)      Polymyalgia rheumatica (H)      PVD (posterior vitreous detachment), both eyes 2005     S/P ablation of atrial flutter 6/20/14    CTI     Stented coronary artery      SVT  (supraventricular tachycardia) (H)     PPM/AICD for NSVT     Thrombosis of leg     Left leg     Upper leg DVT (deep venous thromboembolism), chronic (H)     Left     Weight loss, unintentional 2017    15 lb in 4 months       PSxH:   Past Surgical History:   Procedure Laterality Date     C CABG, ARTERY-VEIN, FOUR  2006    LIMA-LAD, SVG-Rt PDA, SVG-OM2, SVG-Diag 1     C CABG, VEIN, SINGLE  1994    1-vessel CABG, SVG->PDRCA      CATARACT IOL, RT/LT Bilateral      COLONOSCOPY  3/13/2014    Procedure: COMBINED COLONOSCOPY, SINGLE BIOPSY/POLYPECTOMY BY BIOPSY;;  Surgeon: Mary Gerber MD;  Location: [facility]     COLONOSCOPY N/A 6/22/2015    Procedure: COLONOSCOPY;  Surgeon: Marilin Newman MD;  Location: [facility]     H ABLATION ATRIAL FLUTTER       HEART CATH DRUG ELUTING STENT PLACEMENT  4/19/2012    JEREMY x 2 to LCx     IMPLANT IMPLANTABLE CARDIOVERTER DEFIBRILLATOR  5-    ppm/aicd     KNEE SURGERY      right and left knee surgeries     LAPAROSCOPIC ASSISTED COLECTOMY LEFT (DESCENDING)  4/8/2014    Procedure: LAPAROSCOPIC ASSISTED COLECTOMY LEFT (DESCENDING);  Hand assisted Laparoscopic Sigmoid Colectomy , Laparoscopic mobilization of spleenic flexure *Latex Allergy*Anesthesia General with Block;  Surgeon: Chanelle Guzmán MD;  Location:  OR     REPAIR VALVE MITRAL  2006     30-mm Medtronic Julian ring      SELECTIVE LASER TRABECULOPLASTY (SLT) OD (RIGHT EYE)  4/10, 1/12,+1/9/13    RE     SELECTIVE LASER TRABECULOPLASTY (SLT) OS (LEFT EYE)  5/2012     SHOULDER SURGERY      right rotator cuff       Allergies: Latex    SH:   Social History     Social History     Marital status:      Spouse name: N/A     Number of children: N/A     Years of education: N/A     Occupational History     Not on file.     Social History Main Topics     Smoking status: Former Smoker     Types: Cigarettes, Cigars     Quit date: 1/1/1968     Smokeless tobacco: Never Used     Alcohol use 0.0 oz/week     0  Standard drinks or equivalent per week      Comment: 2-3 drinks twice weekly     Drug use: No     Sexual activity: Not on file     Other Topics Concern     Not on file     Social History Narrative       FH:   Family History   Problem Relation Age of Onset     C.A.D. Father      Anesthesia Reaction No family hx of      Crohn Disease No family hx of      Ulcerative Colitis No family hx of      Cancer - colorectal No family hx of      Macular Degeneration No family hx of      CANCER No family hx of      No known family hx of skin cancer       Objective:      PT and DP pulses are not palpble bilaterally. CRT is 3 seconds. Diminished pedal hair. 2+ pitting edema is noted to BL legs with L>R and hemosiderin deposits BL. Varicosities noted to BL feet and some spider veins.   Gross sensation is intact bilaterally.   Equinus is mild bilaterally. No pain with active or passive ROM of the ankle, MTJ, 1st ray, or halluces bilaterally,.   Nails normal bilaterally.   A deroofed bullae is noted at left  anterior leg measuring 5.5cm x 5cm x 0.1cm.    Cavazos Classification: 2    Wound base: Pink/new epithelium     Edges: intact    Drainage: none/serous    Odor: no    Undermining: no    Bone Exposure: No    Clinical Signs of Infection: No    After obtaining patient consent, the wound was irrigated with copious amounts of saline.       Assessment: Left leg bullae in the setting of, what appears to be, chronic peripheral venous dz.     Plan:  - Pt seen and evaluated.  - Wound is almost fully healed.   - I have ordered a venous competency exam.  - For the dressing, MediHoney on the area, followed by Allevyn life and an ACE.  - I have ordered a pair of compression socks to be worn daily. Until they get these, can use Tubigrip.   - See again in 2 weeks. Consider referral to IR.          Again, thank you for allowing me to participate in the care of your patient.      Sincerely,    Leon Wilson DPM

## 2017-11-16 NOTE — NURSING NOTE
Chief Complaint   Patient presents with     Wound Check     wound care       There were no vitals filed for this visit.    There is no height or weight on file to calculate BMI.    Erwin GUNTER

## 2017-11-16 NOTE — MR AVS SNAPSHOT
After Visit Summary   11/16/2017    Noe Florence    MRN: [id number]           Patient Information     Date Of Birth          1935        Visit Information        Provider Department      11/16/2017 8:30 AM Leon Wilson DPM [facility]        Today's Diagnoses     PVD (peripheral vascular disease) (H)    -  1    Ulcer of left lower extremity, limited to breakdown of skin (H)           Follow-ups after your visit        Your next 10 appointments already scheduled     Nov 16, 2017  8:30 AM CST   (Arrive by 8:15 AM)   New Patient Visit with Leon Wilson DPM   [facility] ([facility])    [address] [place] [address] [id number] [phone]            Nov 27, 2017  7:00 AM CST   US LOWER EXTREMITY VENOUS COMPETENCY BILATERAL with UCUSV1   [facility]  ([facility])    [address] [place] [id number] [phone]           Please bring a list of your medicines (including vitamins, minerals and over-the-counter drugs). Also, tell your doctor about any allergies you may have. Wear comfortable clothes and leave your valuables at home.  You do not need to do anything special to prepare for your exam.  Please call the Imaging Department at your exam site with any questions.            Nov 30, 2017  8:30 AM CST   (Arrive by 8:15 AM)   Return Visit with Leon Wilson DPM   [facility] ([facility])    [address] [place] [id number] [phone]            Dec 04, 2017 11:00 AM CST   (Arrive by 10:45 AM)   Implanted Defibulator with Uc Cv Device 1   [facility] ([facility])    [address] [place] [phone] [phone]            Dec 04, 2017 11:40 AM CST   (Arrive by 11:25 AM)   RETURN ARRHYTHMIA with Deedee Baird MD   [facility]  Heart Care ([facility])    [address] [place] [phone]   [phone]            Dec 04, 2017 12:30 PM CST   LAB with  LAB   [facility] Lab [facility])    [address] [place] [phone]   [phone]           Please do not eat 10-12 hours before your appointment if you are coming in fasting for labs on lipids, cholesterol, or glucose (sugar). This does not apply to pregnant women. Water, hot tea and black coffee (with nothing added) are okay. Do not drink other fluids, diet soda or chew gum.            Dec 05, 2017  4:00 PM CST   (Arrive by 3:45 PM)   New Patient Visit with Frida Desai MD   [facility] ([facility])    [address] [place] [phone]   [phone]            Quentin 15, 2018  9:40 AM CST   (Arrive by 9:25 AM)   PHYSICAL with Roberto Samriento MD   [facility] ([facility])    [address] [place] [phone]   [phone]            Feb 12, 2018  3:30 PM CST   (Arrive by 3:15 PM)   Return Visit with Lashawn Jackson MD   [facility] Dermatology ([facility])    [address] [place] [phone]   [phone]            Mar 30, 2018 11:40 AM CDT   (Arrive by 11:25 AM)   CT CHEST/ABDOMEN/PELVIS W CONTRAST with UCCT2   [facility] Imaging [place] [facility] ([facility])    [address] [place] [phone]   [phone]           Please bring any scans or X-rays taken at other hospitals, if similar tests were done. Also bring a list of your medicines, including vitamins, minerals and over-the-counter drugs. It is safest to leave personal items at home.  Be sure to tell your doctor:   If you have any allergies.   If there s any chance you are pregnant.   If you  are breastfeeding.   If you have any special needs.  You may have contrast for this exam. To prepare:   Do not eat or drink for 2 hours before your exam. If you need to take medicine, you may take it with small sips of water. (We may ask you to take liquid medicine as well.)   The day before your exam, drink extra fluids at least six 8-ounce glasses (unless your doctor tells you to restrict your fluids).  Patients over 70 or patients with diabetes or kidney problems:   If you haven t had a blood test (creatinine test) within the last 30 days, go to your clinic or Diagnostic Imaging Department for this test.  If you have diabetes:   If your kidney function is normal, continue taking your metformin (Avandamet, Glucophage, Glucovance, Metaglip) on the day of your exam.   If your kidney function is abnormal, wait 48 hours before restarting this medicine.  You will have oral contrast for this exam:   You will drink the contrast at home. Get this from your clinic or Diagnostic Imaging Department. Please follow the directions given.  Please wear loose clothing, such as a sweat suit or jogging clothes. Avoid snaps, zippers and other metal. We may ask you to undress and put on a hospital gown.  If you have any questions, please call the Imaging Department where you will have your exam.              Future tests that were ordered for you today     Open Future Orders        Priority Expected Expires Ordered    US Venous Competency Bilateral Routine  11/16/2018 11/16/2017            Who to contact     Please call your clinic at [phone] to:    Ask questions about your health    Make or cancel appointments    Discuss your medicines    Learn about your test results    Speak to your doctor   If you have compliments or concerns about an experience at your clinic, or if you wish to file a complaint, please contact [facility] Patient Relations at [phone] or email us at  [email or web address]         Additional Information About Your Visit        MEPS Real-Timehart Information     [employer]Time[employer] gives you secure access to your electronic health record. If you see a primary care provider, you can also send messages to your care team and make appointments. If you have questions, please call your primary care clinic.  If you do not have a primary care provider, please call [phone] and they will assist you.      [employer] is an electronic gateway that provides easy, online access to your medical records. With [employer], you can request a clinic appointment, read your test results, renew a prescription or communicate with your care team.     To access your existing account, please contact your [facility] or call [phone] for assistance.        Care EveryWhere ID     This is your Care EveryWhere ID. This could be used by other organizations to access your [facility] medical records  [id number]         Blood Pressure from Last 3 Encounters:   11/13/17 122/67   11/05/17 110/57   10/03/17 119/69    Weight from Last 3 Encounters:   11/13/17 69.3 kg (152 lb 11.2 oz)   11/04/17 68.5 kg (151 lb 2 oz)   10/03/17 69.2 kg (152 lb 9.6 oz)                 Today's Medication Changes          These changes are accurate as of: 11/16/17  8:18 AM.  If you have any questions, ask your nurse or doctor.               Start taking these medicines.        Dose/Directions    order for DME   Used for:  PVD (peripheral vascular disease) (H), Ulcer of left lower extremity, limited to breakdown of skin (H)        Compression socks - knee 20-30 mm Hg Has open left leg wound   Quantity:  1 Units   Refills:  0            Where to get your medicines      Some of these will need a paper prescription and others can be bought over the counter.  Ask your nurse if you have questions.     Bring a paper prescription for each of these medications     order for DME                Primary Care  Provider Office Phone # Fax #    Roberto Sarmiento [phone]       [address] [place] [id number]        Equal Access to Services     LEE CASTILLO : Hadii aad ku hadtiffanykishore Nara, waaxda luqadaha, qaybta kaalmada aicha, sil bernabe laNikosalena minor. So [place] [phone].    ATENCIÓN: Si habla español, tiene a aguirre disposición servicios gratuitos de asistencia lingüística. Llame al [phone].    We comply with applicable federal civil rights laws and [place] laws. We do not discriminate on the basis of race, color, national origin, age, disability, sex, sexual orientation, or gender identity.            Thank you!     Thank you for choosing [facility]  for your care. Our goal is always to provide you with excellent care. Hearing back from our patients is one way we can continue to improve our services. Please take a few minutes to complete the written survey that you may receive in the mail after your visit with us. Thank you!             Your Updated Medication List - Protect others around you: Learn how to safely use, store and throw away your medicines at www.disposemymeds.org.          This list is accurate as of: 11/16/17  8:18 AM.  Always use your most recent med list.                   Brand Name Dispense Instructions for use Diagnosis    aspirin 81 MG tablet      Take 1 tablet by mouth daily.        atorvastatin 40 MG tablet    LIPITOR    90 tablet    Take 1 tablet (40 mg) by mouth daily    Hyperlipidemia, unspecified hyperlipidemia type       bacitracin ointment     28 g    Apply topically 2 times daily APPLY TO LEFT LEG TWO TIMES PER DAY    Left leg cellulitis       Blood Pressure Monitor Kit     1 kit    1 Units daily    Hypertension       CENTRUM SILVER per tablet      Take 1 tablet by mouth daily. AM        ciclopirox 0.77 % cream    LOPROX    90 g    Apply topically 2 times daily To feet and toenails.    Dermatophytosis of nail, Tinea  pedis of both feet       cyanocobalamin 1000 MCG tablet    vitamin  B-12    180 tablet    Take 2 tablets (2,000 mcg) by mouth daily    Anemia       doxazosin 2 MG tablet    CARDURA    90 tablet    Take 1/2 tab in the morning and 1/2 tab in the evening    Essential hypertension       fluticasone 50 MCG/ACT spray    FLONASE    3 Package    Spray 2 sprays into both nostrils daily    Unspecified sinusitis (chronic)       ketoconazole 2 % cream    NIZORAL    60 g    Apply topically daily On hold    Tinea corporis       loratadine 10 MG tablet    CLARITIN     Take 10 mg by mouth as needed        metoprolol 25 MG tablet    LOPRESSOR    180 tablet    Take 1 tablet (25 mg) by mouth 2 times daily    Coronary artery disease involving native heart without angina pectoris, unspecified vessel or lesion type       mirtazapine 15 MG tablet    REMERON     Take 15 mg by mouth At Bedtime.        order for DME     1 Units    Compression socks - knee 20-30 mm Hg Has open left leg wound    PVD (peripheral vascular disease) (H), Ulcer of left lower extremity, limited to breakdown of skin (H)       triamcinolone 0.1 % cream    KENALOG    80 g    Apply topically 2 times daily For up to two weeks in a row    Atopic eczema       warfarin 5 MG tablet    COUMADIN    35 tablet    7.5 mg on Wed; 5 mg all other days  or as instructed by coumadin clinic.    Atrial flutter (H)       ZOLOFT 100 MG tablet   Generic drug:  sertraline      Take 100 mg by mouth every evening.

## 2017-11-16 NOTE — PROGRESS NOTES
Date of Service: 11/16/2017    Chief Complaint:   Chief Complaint   Patient presents with     Wound Check     wound care        HPI: Noe is a 82 year old male who presents today for further evaluation of left leg wound. He presents with his wife. They relate that a blister started about 2 weeks ago on the front of thel eft leg. This burst in the days after, and the skin sloughed off. They relate that he went to the ED with increased pain and redness going up his leg. He was discharged just over a week ago and was given a course of Keflex and Bactrim DS. He has finished those today. He relates that the area is not painful for him today. He is unsure of the underlying cause for the blister formation.     Review of Systems: No n/v/d/f/c/ns/sob/cp.    PMH:   Past Medical History:   Diagnosis Date     Advanced open-angle glaucoma      Anemia     post-op colectomy     Antiplatelet or antithrombotic long-term use      Arrhythmia      Arthritis     hands     Atrial fibrillation (H)      CKD (chronic kidney disease) stage 3, GFR 30-59 ml/min      CKD (chronic kidney disease), stage III 2005     Colon cancer (H)     Stage II-B colon cancer     Coronary artery disease     s/p CABG x 2, JEREMY x 2     Diverticulitis      History of blood transfusion      Hyperlipidemia      Hypertension      Ischemic cardiomyopathy      MGUS (monoclonal gammopathy of unknown significance)      Nonsenile cataract     BE     Pacemaker      Polymorphic ventricular tachycardia (H)      Polymyalgia rheumatica (H)      PVD (posterior vitreous detachment), both eyes 2005     S/P ablation of atrial flutter 6/20/14    CTI     Stented coronary artery      SVT (supraventricular tachycardia) (H)     PPM/AICD for NSVT     Thrombosis of leg     Left leg     Upper leg DVT (deep venous thromboembolism), chronic (H)     Left     Weight loss, unintentional 2017    15 lb in 4 months       PSxH:   Past Surgical History:   Procedure Laterality Date     C CABG,  ARTERY-VEIN, FOUR  2006    LIMA-LAD, SVG-Rt PDA, SVG-OM2, SVG-Diag 1     C CABG, VEIN, SINGLE  1994    1-vessel CABG, SVG->PDRCA      CATARACT IOL, RT/LT Bilateral      COLONOSCOPY  3/13/2014    Procedure: COMBINED COLONOSCOPY, SINGLE BIOPSY/POLYPECTOMY BY BIOPSY;;  Surgeon: Mary Gerber MD;  Location: [facility]     COLONOSCOPY N/A 6/22/2015    Procedure: COLONOSCOPY;  Surgeon: Marilin Newman MD;  Location: [facility]     H ABLATION ATRIAL FLUTTER       HEART CATH DRUG ELUTING STENT PLACEMENT  4/19/2012    JEREMY x 2 to LCx     IMPLANT IMPLANTABLE CARDIOVERTER DEFIBRILLATOR  5-    ppm/aicd     KNEE SURGERY      right and left knee surgeries     LAPAROSCOPIC ASSISTED COLECTOMY LEFT (DESCENDING)  4/8/2014    Procedure: LAPAROSCOPIC ASSISTED COLECTOMY LEFT (DESCENDING);  Hand assisted Laparoscopic Sigmoid Colectomy , Laparoscopic mobilization of spleenic flexure *Latex Allergy*Anesthesia General with Block;  Surgeon: Chanelle Guzmán MD;  [facility]     REPAIR VALVE MITRAL  2006     30-mm Medtronic Julian ring      SELECTIVE LASER TRABECULOPLASTY (SLT) OD (RIGHT EYE)  4/10, 1/12,+1/9/13    RE     SELECTIVE LASER TRABECULOPLASTY (SLT) OS (LEFT EYE)  5/2012     SHOULDER SURGERY      right rotator cuff       Allergies: Latex    SH:   Social History     Social History     Marital status:      Spouse name: N/A     Number of children: N/A     Years of education: N/A     Occupational History     Not on file.     Social History Main Topics     Smoking status: Former Smoker     Types: Cigarettes, Cigars     Quit date: 1/1/1968     Smokeless tobacco: Never Used     Alcohol use 0.0 oz/week     0 Standard drinks or equivalent per week      Comment: 2-3 drinks twice weekly     Drug use: No     Sexual activity: Not on file     Other Topics Concern     Not on file     Social History Narrative       FH:   Family History   Problem Relation Age of Onset     C.A.D. Father      Anesthesia Reaction No  family hx of      Crohn Disease No family hx of      Ulcerative Colitis No family hx of      Cancer - colorectal No family hx of      Macular Degeneration No family hx of      CANCER No family hx of      No known family hx of skin cancer       Objective:      PT and DP pulses are not palpble bilaterally. CRT is 3 seconds. Diminished pedal hair. 2+ pitting edema is noted to BL legs with L>R and hemosiderin deposits BL. Varicosities noted to BL feet and some spider veins.   Gross sensation is intact bilaterally.   Equinus is mild bilaterally. No pain with active or passive ROM of the ankle, MTJ, 1st ray, or halluces bilaterally,.   Nails normal bilaterally.   A deroofed bullae is noted at left  anterior leg measuring 5.5cm x 5cm x 0.1cm.    Cavazos Classification: 2    Wound base: Pink/new epithelium     Edges: intact    Drainage: none/serous    Odor: no    Undermining: no    Bone Exposure: No    Clinical Signs of Infection: No    After obtaining patient consent, the wound was irrigated with copious amounts of saline.       Assessment: Left leg bullae in the setting of, what appears to be, chronic peripheral venous dz.     Plan:  - Pt seen and evaluated.  - Wound is almost fully healed.   - I have ordered a venous competency exam.  - For the dressing, MediHoney on the area, followed by Allevyn life and an ACE.  - I have ordered a pair of compression socks to be worn daily. Until they get these, can use Tubigrip.   - See again in 2 weeks. Consider referral to IR.

## 2017-11-27 ENCOUNTER — PRE VISIT (OUTPATIENT)
Dept: CARDIOLOGY | Facility: CLINIC | Age: 82
End: 2017-11-27

## 2017-11-27 DIAGNOSIS — I48.91 ATRIAL FIBRILLATION, UNSPECIFIED TYPE (H): ICD-10-CM

## 2017-11-27 DIAGNOSIS — Z79.01 LONG-TERM (CURRENT) USE OF ANTICOAGULANTS: ICD-10-CM

## 2017-11-27 DIAGNOSIS — C18.9 MALIGNANT NEOPLASM OF COLON, UNSPECIFIED PART OF COLON (H): ICD-10-CM

## 2017-11-27 LAB
CEA SERPL-MCNC: 3.6 UG/L (ref 0–2.5)
INR PPP: 1.81 (ref 0.86–1.14)

## 2017-11-28 ENCOUNTER — ANTICOAGULATION THERAPY VISIT (OUTPATIENT)
Dept: ANTICOAGULATION | Facility: CLINIC | Age: 82
End: 2017-11-28

## 2017-11-28 DIAGNOSIS — Z79.01 LONG-TERM (CURRENT) USE OF ANTICOAGULANTS: ICD-10-CM

## 2017-11-28 DIAGNOSIS — I48.91 ATRIAL FIBRILLATION (H): ICD-10-CM

## 2017-11-28 NOTE — PROGRESS NOTES
ANTICOAGULATION FOLLOW-UP CLINIC VISIT    Patient Name:  Noe Florence  Date:  11/28/2017  Contact Type:  Telephone    SUBJECTIVE:     Patient Findings     Positives No Problem Findings, Unexplained INR or factor level change    Comments Pt stopped Bactrim on 11/12. Pt continues to have an open wound, but no other antibiotics have been prescribed. Pt has been taking 2.5mg M and 5mg ROW           OBJECTIVE    INR   Date Value Ref Range Status   11/27/2017 1.81 (H) 0.86 - 1.14 Final       ASSESSMENT / PLAN  INR assessment SUB    Recheck INR In: 1 WEEK    INR Location Clinic      Anticoagulation Summary as of 11/28/2017     INR goal 2.0-3.0   Today's INR 1.81! (11/27/2017)   Maintenance plan No maintenance plan   Full instructions 11/28: 7.5 mg; 11/29: 5 mg; 11/30: 5 mg; 12/1: 5 mg; 12/2: 5 mg; 12/3: 5 mg; 12/4: 5 mg   Plan last modified Mark Ho, [facility] (11/9/2017)   Next INR check 12/4/2017   Priority INR   Target end date Indefinite    Indications   Atrial fibrillation (H) [I48.91] [I48.91]  Long-term (current) use of anticoagulants [Z79.01] [Z79.01]         Anticoagulation Episode Summary     INR check location     Preferred lab     Send INR reminders to [facility]    Comments Patient contact number: [phone]   Can leave results with Rica (friend)      Anticoagulation Care Providers     Provider Role Specialty Phone number    Deedee Baird MD [facility] Card[facility] [phone]            See the Encounter Report to view Anticoagulation Flowsheet and Dosing Calendar (Go to Encounters tab in chart review, and find the Anticoagulation Therapy Visit)    Spoke with friend Rica. Gave them lab results and new warfarin recommendation.  No changes in health, medication, or diet. No missed doses, no falls. No signs or symptoms of bleed or clotting.     Nguyen Zuniga RN

## 2017-11-28 NOTE — MR AVS SNAPSHOT
Noe Florence   11/28/2017   Anticoagulation Therapy Visit    Description:  82 year old male   Provider:  Nguyen Zuniga, RN   Department:  [facility]           INR as of 11/28/2017     Today's INR 1.81! (11/27/2017)      Anticoagulation Summary as of 11/28/2017     INR goal 2.0-3.0   Today's INR 1.81! (11/27/2017)   Full instructions 11/28: 7.5 mg; 11/29: 5 mg; 11/30: 5 mg; 12/1: 5 mg; 12/2: 5 mg; 12/3: 5 mg; 12/4: 5 mg   Next INR check 12/4/2017    Indications   Atrial fibrillation (H) [I48.91] [I48.91]  Long-term (current) use of anticoagulants [Z79.01] [Z79.01]         November 2017 Details    Sun Mon Tue Wed Thu Fri Sat        1               2               3               4                 5               6               7               8               9               10               11                 12               13               14               15               16               17               18                 19               20               21               22               23               24               25                 26               27               28      7.5 mg   See details      29      5 mg         30      5 mg            Date Details   11/28 This INR check               How to take your warfarin dose     To take:  5 mg Take 1 of the 5 mg tablets.    To take:  7.5 mg Take 1.5 of the 5 mg tablets.           December 2017 Details    Sun Mon Tue Wed Thu Fri Sat          1      5 mg         2      5 mg           3      5 mg         4            5               6               7               8               9                 10               11               12               13               14               15               16                 17               18               19               20               21               22               23                 24               25               26               27               28               29               30                  31                      Date Details   No additional details    Date of next INR:  12/4/2017         How to take your warfarin dose     To take:  5 mg Take 1 of the 5 mg tablets.

## 2017-11-30 ENCOUNTER — OFFICE VISIT (OUTPATIENT)
Dept: WOUND CARE | Facility: CLINIC | Age: 82
End: 2017-11-30

## 2017-11-30 DIAGNOSIS — Z87.2 H/O ULCER OF LOWER LIMB: ICD-10-CM

## 2017-11-30 DIAGNOSIS — I73.9 PVD (PERIPHERAL VASCULAR DISEASE) (H): Primary | ICD-10-CM

## 2017-11-30 NOTE — MR AVS SNAPSHOT
After Visit Summary   11/30/2017    Noe Florence    MRN: [id number]           Patient Information     Date Of Birth          1935        Visit Information        Provider Department      11/30/2017 8:30 AM Leon Wilson DPM [facility] W[facility]        Today's Diagnoses     PVD (peripheral vascular disease) (H)    -  1    H/O ulcer of lower limb           Follow-ups after your visit        Your next 10 appointments already scheduled     Dec 04, 2017 11:00 AM CST   (Arrive by 10:45 AM)   Implanted Defibulator with  Cv Device 1   [facility])    [address] [place] [id number]   [phone]            Dec 04, 2017 11:40 AM CST   (Arrive by 11:25 AM)   RETURN ARRHYTHMIA with Deedee Baird MD   [facility])    [address] [place] [id number]   [phone]            Dec 04, 2017 12:30 PM CST   LAB with  LAB   [facility] Lab [facility])    [address] [place] [id number]   [phone]           Please do not eat 10-12 hours before your appointment if you are coming in fasting for labs on lipids, cholesterol, or glucose (sugar). This does not apply to pregnant women. Water, hot tea and black coffee (with nothing added) are okay. Do not drink other fluids, diet soda or chew gum.            Dec 05, 2017  4:00 PM CST   (Arrive by 3:45 PM)   New Patient Visit with Frida Desai MD   [facility])    [address] [place] [address]   [phone]            Quentin 15, 2018  9:40 AM CST   (Arrive by 9:25 AM)   PHYSICAL with Roberto Sarmiento MD   [facility])    [address] [place] [address]   [phone]            Feb 12, 2018  3:30 PM  CST   (Arrive by 3:15 PM)   Return Visit with Lashawn Jackson MD   [facility] ([facility])    [address] [facility]  3rd Floor  [facility] [id number] [phone]            Mar 30, 2018 11:15 AM CDT   LAB with  LAB   [facility] ([facility])    [address] [place] [address]  [address] [id number] [phone]           Please do not eat 10-12 hours before your appointment if you are coming in fasting for labs on lipids, cholesterol, or glucose (sugar). This does not apply to pregnant women. Water, hot tea and black coffee (with nothing added) are okay. Do not drink other fluids, diet soda or chew gum.            Mar 30, 2018 11:40 AM CDT   (Arrive by 11:25 AM)   CT CHEST/ABDOMEN/PELVIS W CONTRAST with UCCT2   [facility] CT ([facility])    [address] [place] [id number] [phone]           Please bring any scans or X-rays taken at other hospitals, if similar tests were done. Also bring a list of your medicines, including vitamins, minerals and over-the-counter drugs. It is safest to leave personal items at home.  Be sure to tell your doctor:   If you have any allergies.   If there s any chance you are pregnant.   If you are breastfeeding.   If you have any special needs.  You may have contrast for this exam. To prepare:   Do not eat or drink for 2 hours before your exam. If you need to take medicine, you may take it with small sips of water. (We may ask you to take liquid medicine as well.)   The day before your exam, drink extra fluids at least six 8-ounce glasses (unless your doctor tells you to restrict your fluids).  Patients over 70 or patients with diabetes or kidney problems:   If you haven t had a blood test (creatinine test) within the last 30 days, go to your clinic or Diagnostic Imaging Department for this test.  If you have diabetes:   If your  kidney function is normal, continue taking your metformin (Avandamet, Glucophage, Glucovance, Metaglip) on the day of your exam.   If your kidney function is abnormal, wait 48 hours before restarting this medicine.  You will have oral contrast for this exam:   You will drink the contrast at home. Get this from your clinic or Diagnostic Imaging Department. Please follow the directions given.  Please wear loose clothing, such as a sweat suit or jogging clothes. Avoid snaps, zippers and other metal. We may ask you to undress and put on a hospital gown.  If you have any questions, please call the Imaging Department where you will have your exam.            Apr 02, 2018 10:45 AM CDT   (Arrive by 10:30 AM)   Return Visit with Francisco Gilliam MD   [facility] ([facility])    [address] [place] [address] [phone]              Who to contact     Please call your clinic at [phone] to:    Ask questions about your health    Make or cancel appointments    Discuss your medicines    Learn about your test results    Speak to your doctor   If you have compliments or concerns about an experience at your clinic, or if you wish to file a complaint, please contact [facility] Patient Relations at [phone] or email us at [email or web address]         Additional Information About Your Visit        MyChart Information     [employer] gives you secure access to your electronic health record. If you see a primary care provider, you can also send messages to your care team and make appointments. If you have questions, please call your primary care clinic.  If you do not have a primary care provider, please call [phone] and they will assist you.      [employer] is an electronic gateway that provides easy, online access to your medical records. With [employer], you can request a clinic appointment, read your test  results, renew a prescription or communicate with your care team.     To access your existing account, please contact your [facility] or call [phone] for assistance.        Care EveryWhere ID     This is your Care EveryWhere ID. This could be used by other organizations to access your [facility] medical records  [id number]         Blood Pressure from Last 3 Encounters:   11/13/17 122/67   11/05/17 110/57   10/03/17 119/69    Weight from Last 3 Encounters:   11/13/17 152 lb 11.2 oz   11/04/17 151 lb 2 oz   10/03/17 152 lb 9.6 oz              Today, you had the following     No orders found for display       Primary Care Provider Office Phone # Fax #    Roberto Sarmiento [phone]       [address] [place] [address]        Equal Access to Services     [facility] Center[facility] : Hadii giovanny olsen hadasho Soomaali, waaxda luqadaha, qaybta kaalmada adeidaliayada, sil burden . So [facility] [phone].    ATENCIÓN: Si habla español, tiene a augirre disposición servicios gratuitos de asistencia lingüística. Llame al [phone].    We comply with applicable federal civil rights laws and [place] laws. We do not discriminate on the basis of race, color, national origin, age, disability, sex, sexual orientation, or gender identity.            Thank you!     Thank you for choosing [facility]  for your care. Our goal is always to provide you with excellent care. Hearing back from our patients is one way we can continue to improve our services. Please take a few minutes to complete the written survey that you may receive in the mail after your visit with us. Thank you!             Your Updated Medication List - Protect others around you: Learn how to safely use, store and throw away your medicines at www.disposemymeds.org.          This list is accurate as of: 11/30/17  9:46 AM.  Always use your most recent med list.                   Brand  Name Dispense Instructions for use Diagnosis    aspirin 81 MG tablet      Take 1 tablet by mouth daily.        atorvastatin 40 MG tablet    LIPITOR    90 tablet    Take 1 tablet (40 mg) by mouth daily    Hyperlipidemia, unspecified hyperlipidemia type       bacitracin ointment     28 g    Apply topically 2 times daily APPLY TO LEFT LEG TWO TIMES PER DAY    Left leg cellulitis       Blood Pressure Monitor Kit     1 kit    1 Units daily    Hypertension       CENTRUM SILVER per tablet      Take 1 tablet by mouth daily. AM        ciclopirox 0.77 % cream    LOPROX    90 g    Apply topically 2 times daily To feet and toenails.    Dermatophytosis of nail, Tinea pedis of both feet       cyanocobalamin 1000 MCG tablet    vitamin  B-12    180 tablet    Take 2 tablets (2,000 mcg) by mouth daily    Anemia       doxazosin 2 MG tablet    CARDURA    90 tablet    Take 1/2 tab in the morning and 1/2 tab in the evening    Essential hypertension       fluticasone 50 MCG/ACT spray    FLONASE    3 Package    Spray 2 sprays into both nostrils daily    Unspecified sinusitis (chronic)       ketoconazole 2 % cream    NIZORAL    60 g    Apply topically daily On hold    Tinea corporis       loratadine 10 MG tablet    CLARITIN     Take 10 mg by mouth as needed        metoprolol 25 MG tablet    LOPRESSOR    180 tablet    Take 1 tablet (25 mg) by mouth 2 times daily    Coronary artery disease involving native heart without angina pectoris, unspecified vessel or lesion type       mirtazapine 15 MG tablet    REMERON     Take 15 mg by mouth At Bedtime.        order for DME     1 Units    Compression socks - knee 20-30 mm Hg Has open left leg wound    PVD (peripheral vascular disease) (H), Ulcer of left lower extremity, limited to breakdown of skin (H)       triamcinolone 0.1 % cream    KENALOG    80 g    Apply topically 2 times daily For up to two weeks in a row    Atopic eczema       warfarin 5 MG tablet    COUMADIN    35 tablet    7.5 mg on Wed;  5 mg all other days  or as instructed by coumadin clinic.    Atrial flutter (H)       ZOLOFT 100 MG tablet   Generic drug:  sertraline      Take 100 mg by mouth every evening.

## 2017-11-30 NOTE — LETTER
11/30/2017       RE: Noe Florence  [address] [facility] [address]     Dear Colleague,    Thank you for referring your patient, Noe Florence, to the [facility] WOUND [facility]ARE at [facility]. Please see a copy of my visit note below.    Chief Complaints and History of Present Illnesses   Patient presents with     WOUND CARE     LEFT LEG WOUND F/U            Allergies   Allergen Reactions     Latex Rash     Rash         Subjective: Noe is a 82 year old male who presents to the clinic today for a follow up of left leg wound. He presents with his wife. They relates that they have picked up the compression socks, but have not started wearing them. They have an appointment with Dr. Desai for discussion about the vascular disease. They did use the Medihoney as directed and the wound has completely healed over today.     Objective    No wound noted today. Crusting noted at the site of the previous wound. No s/s of infection noted. Hemosiderin deposits noted to BL legs. 1+ pitting edema noted to the legs. No new open lesions noted today.     Venous competency:    Impression:  1. Right leg: Incompetence of right common femoral vein, residual  right great saphenous vein in the proximal and mid thigh and  incompetent right saphenofemoral junction. The great saphenous vein  dimensions in this location may be from 2 to 6 mm. Incompetent   vein measuring 4 mm in the leg four centimeter from the  medial malleolus, which connects to a competent tributary of the great  saphenous vein. No varicose veins. Incidental right popliteal cyst.     2. Left leg: Incompetence of the common femoral vein, femoral vein in  the distal thigh and popliteal vein. Incompetent saphenofemoral  junction, greater saphenous vein in the proximal calf. The diameter of  the greater saphenous vein about the SFJ varies from 4.9 to 7.2 mm and  3 to 4.4 mm in the region of the knee/proximal calf. 2  "incompetent  perforators in the lower leg, measuring up to 2.7 and 2.3 cm. No  varicose veins.     3. Please see detailed assessment of arterial vasculature as per  report 11/13/2017.     Reference: \"Duplex Ultrasound in the Diagnosis of Lower-Extremity Deep  Venous Thrombosis\"- Bhavana Caballero MD, S; Max Alcala MD  (Circulation. 2014;129:917-921. http://circ.ahajournals.org )     I have personally reviewed the examination and initial interpretation  and I agree with the findings.     LESTER MONTANEZ MD    Assessment: BL PVD with h/o venous ulceration that is currently healed.    Plan:   - Pt seen and evaluated  - I discussed the results of the competency exam. I have recommended that he cover the crust with a bandaid and wear the compression socks. They would like to see Dr. Lloyd marshall.   - Pt to return to clinic PRN.       Again, thank you for allowing me to participate in the care of your patient.      Sincerely,    Leon Wilson DPM    "

## 2017-11-30 NOTE — PROGRESS NOTES
"Chief Complaints and History of Present Illnesses   Patient presents with     WOUND CARE     LEFT LEG WOUND F/U            Allergies   Allergen Reactions     Latex Rash     Rash         Subjective: Noe is a 82 year old male who presents to the clinic today for a follow up of left leg wound. He presents with his wife. They relates that they have picked up the compression socks, but have not started wearing them. They have an appointment with Dr. Desai for discussion about the vascular disease. They did use the Medihoney as directed and the wound has completely healed over today.     Objective    No wound noted today. Crusting noted at the site of the previous wound. No s/s of infection noted. Hemosiderin deposits noted to BL legs. 1+ pitting edema noted to the legs. No new open lesions noted today.     Venous competency:    Impression:  1. Right leg: Incompetence of right common femoral vein, residual  right great saphenous vein in the proximal and mid thigh and  incompetent right saphenofemoral junction. The great saphenous vein  dimensions in this location may be from 2 to 6 mm. Incompetent   vein measuring 4 mm in the leg four centimeter from the  medial malleolus, which connects to a competent tributary of the great  saphenous vein. No varicose veins. Incidental right popliteal cyst.     2. Left leg: Incompetence of the common femoral vein, femoral vein in  the distal thigh and popliteal vein. Incompetent saphenofemoral  junction, greater saphenous vein in the proximal calf. The diameter of  the greater saphenous vein about the SFJ varies from 4.9 to 7.2 mm and  3 to 4.4 mm in the region of the knee/proximal calf. 2 incompetent  perforators in the lower leg, measuring up to 2.7 and 2.3 cm. No  varicose veins.     3. Please see detailed assessment of arterial vasculature as per  report 11/13/2017.     Reference: \"Duplex Ultrasound in the Diagnosis of Lower-Extremity Deep  Venous Thrombosis\"- Bhavana " FELIPE Caballero MD, S; Max Alcala MD  (Circulation. 2014;129:917-921. http://circ.ahajournals.org )     I have personally reviewed the examination and initial interpretation  and I agree with the findings.     LESTER MONTANEZ MD    Assessment: BL PVD with h/o venous ulceration that is currently healed.    Plan:   - Pt seen and evaluated  - I discussed the results of the competency exam. I have recommended that he cover the crust with a bandaid and wear the compression socks. They would like to see Dr. Desai still.   - Pt to return to clinic PRN.

## 2017-12-04 ENCOUNTER — OFFICE VISIT (OUTPATIENT)
Dept: CARDIOLOGY | Facility: CLINIC | Age: 82
End: 2017-12-04
Attending: INTERNAL MEDICINE
Payer: COMMERCIAL

## 2017-12-04 ENCOUNTER — PRE VISIT (OUTPATIENT)
Dept: CARDIOLOGY | Facility: CLINIC | Age: 82
End: 2017-12-04

## 2017-12-04 VITALS
HEIGHT: 67 IN | HEART RATE: 70 BPM | WEIGHT: 151.1 LBS | DIASTOLIC BLOOD PRESSURE: 58 MMHG | BODY MASS INDEX: 23.72 KG/M2 | SYSTOLIC BLOOD PRESSURE: 117 MMHG | OXYGEN SATURATION: 97 %

## 2017-12-04 DIAGNOSIS — Z86.79 S/P ABLATION OF ATRIAL FLUTTER: Primary | ICD-10-CM

## 2017-12-04 DIAGNOSIS — Z95.810 AUTOMATIC IMPLANTABLE CARDIOVERTER-DEFIBRILLATOR IN SITU: ICD-10-CM

## 2017-12-04 DIAGNOSIS — Z98.890 S/P ABLATION OF ATRIAL FLUTTER: Primary | ICD-10-CM

## 2017-12-04 DIAGNOSIS — I48.0 PAROXYSMAL ATRIAL FIBRILLATION (H): ICD-10-CM

## 2017-12-04 DIAGNOSIS — I25.5 ISCHEMIC CARDIOMYOPATHY: Primary | ICD-10-CM

## 2017-12-04 PROCEDURE — 93283 PRGRMG EVAL IMPLANTABLE DFB: CPT | Mod: 26 | Performed by: INTERNAL MEDICINE

## 2017-12-04 PROCEDURE — 99212 OFFICE O/P EST SF 10 MIN: CPT | Mod: ZF

## 2017-12-04 PROCEDURE — 93283 PRGRMG EVAL IMPLANTABLE DFB: CPT | Mod: ZF

## 2017-12-04 PROCEDURE — 99214 OFFICE O/P EST MOD 30 MIN: CPT | Mod: ZP | Performed by: INTERNAL MEDICINE

## 2017-12-04 ASSESSMENT — PAIN SCALES - GENERAL: PAINLEVEL: NO PAIN (0)

## 2017-12-04 NOTE — PATIENT INSTRUCTIONS
It was a pleasure to see you in clinic today.  Please do not hesitate to call with any questions or concerns.  You are scheduled for a remote transmission on 3/7/18.  We look forward to seeing you in clinic at your next device check in 6 months.    Kathy Bright, RN, MS, CCRN  Electrophysiology Nurse Clinician  [facility]    During Business Hours Please Call:  [phone]  After Hours Please Call:  [phone] - select option #4 and ask for job code [phone]

## 2017-12-04 NOTE — PATIENT INSTRUCTIONS
Cardiology Provider you saw in clinic today: Dr. Baird    Follow-up Visit:  1 year with Dr. Baird and device check prior        Please contact us via [employer]ize for questions or concerns.    Lashawn Cool RN   Electrophysiology Nurse Coordinator.  [phone]    If your question concerns the schedule/appointment times, contact:  [facility] Procedure   [phone]    Device Clinic (Pacemakers, ICD, Loop Recorders)   [phone]      You will receive all normal lab and testing results via coUrbanize or mail if not reviewed in clinic today.   If you need a medication refill please contact your pharmacy.    As always, thank you for trusting us with your health care needs!

## 2017-12-04 NOTE — PROGRESS NOTES
Patient seen in clinic for evaluation and iterative programming of his Medtronic dual lead ICD per MD orders.  Patient has an appointment to see Dr. Baird today.  Normal ICD function.  1 AT episode recorded - 10 min 49 sec.  Patient is taking Jantoven. Intrinsic rhythm = NSR @ 70 bpm.  AP = 75.4%.   = 8.4%.  OptiVol fluid index is at baseline.  Battery voltage = 2.8 V.  Patient reports that he is feeling well and denies complaints.  Plan for patient to send a remote transmission in 3 months and return to clinic in 6 months.  Dr. Baird notified of interrogation results.    Dual lead ICD

## 2017-12-04 NOTE — MR AVS SNAPSHOT
After Visit Summary   12/4/2017    Noe Florence    MRN: [id number]           Patient Information     Date Of Birth          1935        Visit Information        Provider Department      12/4/2017 11:00 AM 1, [place] Cv Device [facility]        Today's Diagnoses     Ischemic cardiomyopathy    -  1      Care Instructions    It was a pleasure to see you in clinic today.  Please do not hesitate to call with any questions or concerns.  You are scheduled for a remote transmission on 3/7/18.  We look forward to seeing you in clinic at your next device check in 6 months.    Kathy Bright, RN, MS, CCRN  Electrophysiology Nurse Clinician  [facility]    During Business Hours Please Call:  [phone]  After Hours Please Call:  [phone] - select option #4 and ask for job code [phone]                        Follow-ups after your visit        Follow-up notes from your care team     Return in about 6 months (around 6/4/2018) for ICD Check.      Your next 10 appointments already scheduled     Dec 04, 2017 12:30 PM CST   LAB with  LAB    [facility])    [address] [place] [id number] [phone]           Please do not eat 10-12 hours before your appointment if you are coming in fasting for labs on lipids, cholesterol, or glucose (sugar). This does not apply to pregnant women. Water, hot tea and black coffee (with nothing added) are okay. Do not drink other fluids, diet soda or chew gum.            Dec 05, 2017  4:00 PM CST   (Arrive by 3:45 PM)   New Patient Visit with Frida Desai MD   [facility])    [address] [place] [id number] [phone]            Dec 21, 2017  8:30 AM CST   (Arrive by 8:15 AM)   Return Visit with Tiffany Kenyon MD   [facility])    [address] [place] [address]    3rd [facility] [address]   [phone]            Quentin 15, 2018  9:40 AM CST   (Arrive by 9:25 AM)   PHYSICAL with Roberto Sarmiento MD   [facility] ([facility])    [address] [place] [id number]   [phone]            Feb 12, 2018  3:30 PM CST   (Arrive by 3:15 PM)   Return Visit with Lashawn Jackson MD   [facility] Dermatology ([facility])    [address] [place] [id number]   [phone]            Mar 30, 2018 11:15 AM CDT   LAB with  LAB   [facility] Lab ([facility])    [address] [place] [id number]   [phone]           Please do not eat 10-12 hours before your appointment if you are coming in fasting for labs on lipids, cholesterol, or glucose (sugar). This does not apply to pregnant women. Water, hot tea and black coffee (with nothing added) are okay. Do not drink other fluids, diet soda or chew gum.            Mar 30, 2018 11:40 AM CDT   (Arrive by 11:25 AM)   CT CHEST/ABDOMEN/PELVIS W CONTRAST with UCCT2   [facility] Imaging Center CT ([facility])    [address] [place] [phone]   [phone]           Please bring any scans or X-rays taken at other hospitals, if similar tests were done. Also bring a list of your medicines, including vitamins, minerals and over-the-counter drugs. It is safest to leave personal items at home.  Be sure to tell your doctor:   If you have any allergies.   If there s any chance you are pregnant.   If you are breastfeeding.   If you have any special needs.  You may have contrast for this exam. To prepare:   Do not eat or drink for 2 hours before your exam. If you need to take medicine, you may take it with small sips of water. (We may ask you to take liquid medicine as well.)   The day before your exam, drink  extra fluids at least six 8-ounce glasses (unless your doctor tells you to restrict your fluids).  Patients over 70 or patients with diabetes or kidney problems:   If you haven t had a blood test (creatinine test) within the last 30 days, go to your clinic or Diagnostic Imaging Department for this test.  If you have diabetes:   If your kidney function is normal, continue taking your metformin (Avandamet, Glucophage, Glucovance, Metaglip) on the day of your exam.   If your kidney function is abnormal, wait 48 hours before restarting this medicine.  You will have oral contrast for this exam:   You will drink the contrast at home. Get this from your clinic or Diagnostic Imaging Department. Please follow the directions given.  Please wear loose clothing, such as a sweat suit or jogging clothes. Avoid snaps, zippers and other metal. We may ask you to undress and put on a hospital gown.  If you have any questions, please call the Imaging Department where you will have your exam.            Apr 02, 2018 10:45 AM CDT   (Arrive by 10:30 AM)   Return Visit with Francisco Gilliam MD   [facility] ([facility] [place] [facility])    [address] [place] [address]  2nd Floor  [facility] [id number] [phone]            Jun 04, 2018  9:00 AM CDT   (Arrive by 8:45 AM)   Implanted Defibulator with Uc Cv Device 1   [facility] ([facility] [place] [facility])    [address] [facility]  3rd [facility] [id number] [phone]              Who to contact     If you have questions or need follow up information about today's clinic visit or your schedule please contact [facility] directly at [phone].  Normal or non-critical lab and imaging results will be communicated to you by MyChart, letter or phone within 4 business days after the clinic has received the results. If you do not hear from us within 7 days, please contact the clinic through MyChart or phone. If you have a  critical or abnormal lab result, we will notify you by phone as soon as possible.  Submit refill requests through [employer] or call your pharmacy and they will forward the refill request to us. Please allow 3 business days for your refill to be completed.          Additional Information About Your Visit        [employer] Information     [employer] gives you secure access to your electronic health record. If you see a primary care provider, you can also send messages to your care team and make appointments. If you have questions, please call your primary care clinic.  If you do not have a primary care provider, please call [phone] and they will assist you.        Care EveryWhere ID     This is your Care EveryWhere ID. This could be used by other organizations to access your [facility] medical records  [id number]         Blood Pressure from Last 3 Encounters:   12/04/17 117/58   11/13/17 122/67   11/05/17 110/57    Weight from Last 3 Encounters:   12/04/17 68.5 kg (151 lb 1.6 oz)   11/13/17 69.3 kg (152 lb 11.2 oz)   11/04/17 68.5 kg (151 lb 2 oz)              We Performed the Following     ICD DEVICE PROGRAMMING EVAL, DUAL LEAD ICD        Primary Care Provider Office Phone # Fax #    Roberto Sarmiento [phone]       [address] [place] [address]        Equal Access to Services     LEE CASTILLO : Hadii aad ku hadasho Soomaali, waaxda luqadaha, qaybta kaalmada adeegyada, sil minor. So [facility] [phone].    ATENCIÓN: Si habla español, tiene a aguirre disposición servicios gratuitos de asistencia lingüística. Llame al [phone].    We comply with applicable federal civil rights laws and [place] laws. We do not discriminate on the basis of race, color, national origin, age, disability, sex, sexual orientation, or gender identity.            Thank you!     Thank you for choosing [facility]  for your care. Our goal is always to provide you with  excellent care. Hearing back from our patients is one way we can continue to improve our services. Please take a few minutes to complete the written survey that you may receive in the mail after your visit with us. Thank you!             Your Updated Medication List - Protect others around you: Learn how to safely use, store and throw away your medicines at www.disposemymeds.org.          This list is accurate as of: 12/4/17 12:10 PM.  Always use your most recent med list.                   Brand Name Dispense Instructions for use Diagnosis    aspirin 81 MG tablet      Take 1 tablet by mouth daily.        atorvastatin 40 MG tablet    LIPITOR    90 tablet    Take 1 tablet (40 mg) by mouth daily    Hyperlipidemia, unspecified hyperlipidemia type       bacitracin ointment     28 g    Apply topically 2 times daily APPLY TO LEFT LEG TWO TIMES PER DAY    Left leg cellulitis       Blood Pressure Monitor Kit     1 kit    1 Units daily    Hypertension       CENTRUM SILVER per tablet      Take 1 tablet by mouth daily. AM        ciclopirox 0.77 % cream    LOPROX    90 g    Apply topically 2 times daily To feet and toenails.    Dermatophytosis of nail, Tinea pedis of both feet       cyanocobalamin 1000 MCG tablet    vitamin  B-12    180 tablet    Take 2 tablets (2,000 mcg) by mouth daily    Anemia       doxazosin 2 MG tablet    CARDURA    90 tablet    Take 1/2 tab in the morning and 1/2 tab in the evening    Essential hypertension       fluticasone 50 MCG/ACT spray    FLONASE    3 Package    Spray 2 sprays into both nostrils daily    Unspecified sinusitis (chronic)       ketoconazole 2 % cream    NIZORAL    60 g    Apply topically daily On hold    Tinea corporis       loratadine 10 MG tablet    CLARITIN     Take 10 mg by mouth as needed        metoprolol 25 MG tablet    LOPRESSOR    180 tablet    Take 1 tablet (25 mg) by mouth 2 times daily    Coronary artery disease involving native heart without angina pectoris, unspecified  vessel or lesion type       mirtazapine 15 MG tablet    REMERON     Take 15 mg by mouth At Bedtime.        order for DME     1 Units    Compression socks - knee 20-30 mm Hg Has open left leg wound    PVD (peripheral vascular disease) (H), Ulcer of left lower extremity, limited to breakdown of skin (H)       triamcinolone 0.1 % cream    KENALOG    80 g    Apply topically 2 times daily For up to two weeks in a row    Atopic eczema       warfarin 5 MG tablet    COUMADIN    35 tablet    7.5 mg on Wed; 5 mg all other days  or as instructed by coumadin clinic.    Atrial flutter (H)       ZOLOFT 100 MG tablet   Generic drug:  sertraline      Take 100 mg by mouth every evening.

## 2017-12-04 NOTE — PROGRESS NOTES
HPI: Purpose of visit: Patient comes back for follow-up of atrial fibrillation and atrial flutter.    We had the pleasure of seeing Pt Noe Florence in Cardiology today. Pt is an 82 year old male with several medical problems including HTN, HL, CKD3, CAD s/p CABG x2, DESx2, polymorphic VT s/p ICD placement, h/o atrial fibrillation and atrial flutter s/p cavotricuspid isthmus ablation (6/2014) and right atrial appendage atrial tachycardia ablation (9/2014). He was last seen in Cardiology clinic on 12/2016 regarding his h/o atrial tachycardia. At that time, he was found to be in stable health and no changes were made to his cardiovascular regimen.     Since then, Pt Noe Florence has been doing well. He denies any chest pain, chest pressure, dizziness, lightheadedness, syncope or presyncope, orthopnea, PND, or LE edema. He has had no difficulties or adverse side effects with his current cardiac regimen. He underwent device interrogation earlier today on which showed 1% burden of AT/AF. These episodes were asymptomatic.      PAST MEDICAL HISTORY:  Past Medical History:   Diagnosis Date     Advanced open-angle glaucoma      Anemia     post-op colectomy     Antiplatelet or antithrombotic long-term use      Arrhythmia      Arthritis     hands     Atrial fibrillation (H)      CKD (chronic kidney disease) stage 3, GFR 30-59 ml/min      CKD (chronic kidney disease), stage III 2005     Colon cancer (H)     Stage II-B colon cancer     Coronary artery disease     s/p CABG x 2, JEREMY x 2     Diverticulitis      History of blood transfusion      Hyperlipidemia      Hypertension      Ischemic cardiomyopathy      MGUS (monoclonal gammopathy of unknown significance)      Nonsenile cataract     BE     Pacemaker      Polymorphic ventricular tachycardia (H)      Polymyalgia rheumatica (H)      PVD (posterior vitreous detachment), both eyes 2005     S/P ablation of atrial flutter 6/20/14    CTI     Stented coronary artery      SVT  (supraventricular tachycardia) (H)     PPM/AICD for NSVT     Thrombosis of leg     Left leg     Upper leg DVT (deep venous thromboembolism), chronic (H)     Left     Weight loss, unintentional 2017    15 lb in 4 months       CURRENT MEDICATIONS:  Current Outpatient Prescriptions   Medication Sig Dispense Refill     order for DME Compression socks - knee  20-30 mm Hg  Has open left leg wound 1 Units 0     bacitracin ointment Apply topically 2 times daily APPLY TO LEFT LEG TWO TIMES PER DAY 28 g 0     warfarin (COUMADIN) 5 MG tablet 7.5 mg on Wed; 5 mg all other days  or as instructed by coumadin clinic. 35 tablet 5     Blood Pressure Monitor KIT 1 Units daily 1 kit 0     cyanocobalamin (VITAMIN  B-12) 1000 MCG tablet Take 2 tablets (2,000 mcg) by mouth daily 180 tablet 3     doxazosin (CARDURA) 2 MG tablet Take 1/2 tab in the morning and 1/2 tab in the evening 90 tablet 3     atorvastatin (LIPITOR) 40 MG tablet Take 1 tablet (40 mg) by mouth daily 90 tablet 3     metoprolol (LOPRESSOR) 25 MG tablet Take 1 tablet (25 mg) by mouth 2 times daily 180 tablet 3     ciclopirox (LOPROX) 0.77 % cream Apply topically 2 times daily To feet and toenails. 90 g 6     triamcinolone (KENALOG) 0.1 % cream Apply topically 2 times daily For up to two weeks in a row 80 g 3     ketoconazole (NIZORAL) 2 % cream Apply topically daily On hold 60 g 3     loratadine (CLARITIN) 10 MG tablet Take 10 mg by mouth as needed        fluticasone (FLONASE) 50 MCG/ACT nasal spray Spray 2 sprays into both nostrils daily 3 Package 0     Multiple Vitamins-Minerals (CENTRUM SILVER) per tablet Take 1 tablet by mouth daily. AM        aspirin 81 MG tablet Take 1 tablet by mouth daily.       mirtazapine (REMERON) 15 MG tablet Take 15 mg by mouth At Bedtime.       sertraline (ZOLOFT) 100 MG tablet Take 100 mg by mouth every evening.         PAST SURGICAL HISTORY:  Past Surgical History:   Procedure Laterality Date     C CABG, ARTERY-VEIN, FOUR  2006     LIMA-LAD, SVG-Rt PDA, SVG-OM2, SVG-Diag 1     C CABG, VEIN, SINGLE  1994    1-vessel CABG, SVG->PDRCA      CATARACT IOL, RT/LT Bilateral      COLONOSCOPY  3/13/2014    Procedure: COMBINED COLONOSCOPY, SINGLE BIOPSY/POLYPECTOMY BY BIOPSY;;  Surgeon: Mary Gerber MD;  Location: [facility]     COLONOSCOPY N/A 6/22/2015    Procedure: COLONOSCOPY;  Surgeon: Marilin Newman MD;  Location: [facility]     H ABLATION ATRIAL FLUTTER       HEART CATH DRUG ELUTING STENT PLACEMENT  4/19/2012    JEREMY x 2 to LCx     IMPLANT IMPLANTABLE CARDIOVERTER DEFIBRILLATOR  5-    ppm/aicd     KNEE SURGERY      right and left knee surgeries     LAPAROSCOPIC ASSISTED COLECTOMY LEFT (DESCENDING)  4/8/2014    Procedure: LAPAROSCOPIC ASSISTED COLECTOMY LEFT (DESCENDING);  Hand assisted Laparoscopic Sigmoid Colectomy , Laparoscopic mobilization of spleenic flexure *Latex Allergy*Anesthesia General with Block;  Surgeon: Chanelle Guzmán MD;  Location: [facility] OR     REPAIR VALVE MITRAL  2006     30-mm Medtronic Julian ring      SELECTIVE LASER TRABECULOPLASTY (SLT) OD (RIGHT EYE)  4/10, 1/12,+1/9/13    RE     SELECTIVE LASER TRABECULOPLASTY (SLT) OS (LEFT EYE)  5/2012     SHOULDER SURGERY      right rotator cuff       ALLERGIES:     Allergies   Allergen Reactions     Latex Rash     Rash       FAMILY HISTORY:  - Premature coronary artery disease  - Atrial fibrillation  - Sudden cardiac death     SOCIAL HISTORY:  Social History   Substance Use Topics     Smoking status: Former Smoker     Types: Cigarettes, Cigars     Quit date: 1/1/1968     Smokeless tobacco: Never Used     Alcohol use 0.0 oz/week     0 Standard drinks or equivalent per week      Comment: 2-3 drinks twice weekly       ROS:   Constitutional: No fever, chills, or sweats. Weight stable.   ENT: No visual disturbance, ear ache, epistaxis, sore throat.   Cardiovascular: As per HPI.   Respiratory: No cough, hemoptysis.    GI: No nausea, vomiting, hematemesis, melena, or  "hematochezia.   : No hematuria.   Integument: Negative.   Psychiatric: Negative.   Hematologic:  Easy bruising, no easy bleeding.  Neuro: Negative.   Endocrinology: No significant heat or cold intolerance   Musculoskeletal: No myalgia.    Exam:  /58 (BP Location: Right arm, Patient Position: Chair, Cuff Size: Adult Regular)  Pulse 70  Ht 1.702 m (5' 7\")  Wt 68.5 kg (151 lb 1.6 oz)  SpO2 97%  BMI 23.67 kg/m2  GENERAL APPEARANCE: healthy, alert and no distress  HEENT: no icterus, no xanthelasmas, normal pupil size and reaction, normal palate, mucosa moist, no central cyanosis  NECK: no adenopathy, no asymmetry, masses, or scars, thyroid normal to palpation and no bruits, JVP not elevated  RESPIRATORY: lungs clear to auscultation - no rales, rhonchi or wheezes, no use of accessory muscles, no retractions, respirations are unlabored, normal respiratory rate  CARDIOVASCULAR: regular rhythm, normal S1 with physiologic split S2, no S3 or S4 and no murmur, click or rub, precordium quiet with normal PMI.  ABDOMEN: soft, non tender, without hepatosplenomegaly, no masses palpable, bowel sounds normal, aorta not enlarged by palpation, no abdominal bruits  EXTREMITIES: peripheral pulses normal, no edema, no bruits  NEURO: alert and oriented to person/place/time, normal speech, gait and affect  VASC: Radial, femoral, dorsalis pedis and posterior tibialis pulses are normal in volumes and symmetric bilaterally. No bruits are heard.  SKIN: no ecchymoses, no rashes    Labs:  CBC RESULTS:   Lab Results   Component Value Date    WBC 6.0 11/05/2017    RBC 3.17 (L) 11/05/2017    HGB 9.6 (L) 11/05/2017    HCT 30.5 (L) 11/05/2017    MCV 96 11/05/2017    MCH 30.3 11/05/2017    MCHC 31.5 11/05/2017    RDW 14.1 11/05/2017     11/05/2017       BMP RESULTS:  Lab Results   Component Value Date     11/05/2017    POTASSIUM 4.4 11/05/2017    CHLORIDE 113 (H) 11/05/2017    CO2 25 11/05/2017    ANIONGAP 6 11/05/2017    GLC " 83 11/05/2017    BUN 28 11/05/2017    CR 1.12 11/05/2017    GFRESTIMATED 63 11/05/2017    GFRESTBLACK 76 11/05/2017    KEITH 8.2 (L) 11/05/2017        INR RESULTS:  Lab Results   Component Value Date    INR 1.81 (H) 11/27/2017    INR 2.80 (H) 11/13/2017    INR 3.21 (H) 11/09/2017    INR 3.11 (H) 11/06/2017       Procedures:      Assessment and Plan:       Pt Noe Florence is an 82 year old male with several medical problems including HTN, HL, CKD3, CAD s/p CABG x2, DESx2, polymorphic VT s/p ICD placement, h/o atrial fibrillation and atrial flutter s/p cavotricuspid isthmus ablation (6/2014) and right atrial appendage atrial tachycardia ablation (9/2014).     # Atrial tachycardia: Remains in NSR with only rare episodes on ICD interrogation earlier today                          - Continue metoprolol tartrate 25mg PO BID                         - continue warfarin       # CAD s/p CABG x2, JEREMY x2                         - continue ASA 81mg daily                         - continue atorvastatin 40mg daily     CC  Patient Care Team:  Roberto Sarmiento MD as PCP - General (Internal Medicine)  Francisco Gilliam MD as MD (Oncology)  Omero Srinivasan MD as MD (Ophthalmology)  Hay Arnett MD as MD (Internal Medicine)  Kelvin Chilel MD as MD (Student in organized health care education/training program)  NIKHIL Wheeler MD as MD (Dermatology)  Tiffany Kenyon MD as MD (Internal Medicine)  Deedee Baird MD as MD (Cardiology)  Lashawn Cool, BYRON as Nurse Coordinator (Clinical Cardiac Electrophysiology)  Bhavana Mckeon MD as MD (Internal Medicine)  Naveed Ram MD as Resident  Guzman Boudreaux DPM (Podiatry)  Joshua Centeno MD as MD (Family Practice)  Shante Drummond, RN as Nurse Coordinator (Oncology)  Brett Petersen MD as MD (Dermapathology)  Clarice Magallanes APRN CNP as Nurse Practitioner (Nurse Practitioner - Family)  Lashawn Jackson MD as  MD (Dermatology)  CYNTHIA CARRION

## 2017-12-05 ENCOUNTER — OFFICE VISIT (OUTPATIENT)
Dept: CARDIOLOGY | Facility: CLINIC | Age: 82
End: 2017-12-05
Attending: INTERNAL MEDICINE
Payer: COMMERCIAL

## 2017-12-05 VITALS
WEIGHT: 152 LBS | DIASTOLIC BLOOD PRESSURE: 66 MMHG | OXYGEN SATURATION: 96 % | HEIGHT: 67 IN | BODY MASS INDEX: 23.86 KG/M2 | SYSTOLIC BLOOD PRESSURE: 130 MMHG | HEART RATE: 73 BPM

## 2017-12-05 DIAGNOSIS — L97.921 ULCER OF LEFT LOWER EXTREMITY, LIMITED TO BREAKDOWN OF SKIN (H): ICD-10-CM

## 2017-12-05 DIAGNOSIS — I73.9 PVD (PERIPHERAL VASCULAR DISEASE) (H): ICD-10-CM

## 2017-12-05 PROCEDURE — 99215 OFFICE O/P EST HI 40 MIN: CPT | Mod: ZP | Performed by: INTERNAL MEDICINE

## 2017-12-05 PROCEDURE — 99213 OFFICE O/P EST LOW 20 MIN: CPT | Mod: ZF

## 2017-12-05 ASSESSMENT — PAIN SCALES - GENERAL: PAINLEVEL: NO PAIN (0)

## 2017-12-05 NOTE — PATIENT INSTRUCTIONS
You were seen today in the Cardiovascular Clinic at the [facility].      Cardiology Providers you saw during your visit:  Dr. Desai    Diagnosis:  Venous insufficiency    Results:  None today    Recommendations:   Use new prescription to get compression stockings re-measured and fit.                 [facility] [place] [facility] and [facility]                 [facility] - [place] next to [facility]    Follow-up:  6 months with Dr. Desai.      For emergencies call [phone].    For any scheduling needs, please call [phone]. Option 1 then option 3    Thank you for your visit today!     Please call if you have any questions or concerns.  Juanito Woodard RN

## 2017-12-05 NOTE — NURSING NOTE
Med Reconcile: Reviewed and verified all current medications with the patient. The updated medication list was printed and given to the patient.  New Medication: 20-30 mm Hg knee length compression stockings.  Patient was educated regarding newly prescribed medication, including discussion of  the indication, administration, side effects, and when to report to MD or RN. Patient demonstrated understanding of this information and agreed to call with further questions or concerns.  Return Appointment:  6 months with Dr. Desai.  Patient given instructions regarding scheduling next clinic visit. Patient demonstrated understanding of this information and agreed to call with further questions or concerns.  Patient stated he understood all health information given and agreed to call with further questions or concerns.

## 2017-12-05 NOTE — MR AVS SNAPSHOT
After Visit Summary   12/5/2017    Noe Florence    MRN: [id number]           Patient Information     Date Of Birth          1935        Visit Information        Provider Department      12/5/2017 4:00 PM Frida Desai MD [facility]        Today's Diagnoses     PVD (peripheral vascular disease) (H)        Ulcer of left lower extremity, limited to breakdown of skin (H)          Care Instructions    You were seen today in the Cardiovascular Clinic at the [facility].      Cardiology Providers you saw during your visit:  Dr. Desai    Diagnosis:  Venous insufficiency    Results:  None today    Recommendations:   Use new prescription to get compression stockings re-measured and fit.                 [facility] and [address]                 [facility] - [place] next to [facility]    Follow-up:  6 months with Dr. Desai.      For emergencies call 911.    For any scheduling needs, please call [phone]. Option 1 then option 3    Thank you for your visit today!     Please call if you have any questions or concerns.  Juanito Woodard RN              Follow-ups after your visit        Follow-up notes from your care team     See patient instructions section of the AVS Return in about 6 months (around 6/5/2018) for Dr. Desai, CAD, venous insufficiency.      Your next 10 appointments already scheduled     Dec 21, 2017  8:30 AM CST   (Arrive by 8:15 AM)   Return Visit with Tiffany Kenyon MD   [facility] ([facility])    [address] [place] [address]  3rd [facility] [id number] [phone]            Quentin 15, 2018  9:40 AM CST   (Arrive by 9:25 AM)   PHYSICAL with Roberto Sarmiento MD   [facility] ([facility])    [address] [place] [address]  [facility] [address]   [phone]            Feb 12, 2018  3:30 PM CST   (Arrive by 3:15 PM)   Return Visit with Lashawn Jackson MD   [facility] ([facility])    [address] [place] [address]  3rd [facility] [address]   [phone]            Mar 30, 2018 11:15 AM CDT   LAB with  LAB   [facility] ([facility])    [address] [place] [address]   [phone]           Please do not eat 10-12 hours before your appointment if you are coming in fasting for labs on lipids, cholesterol, or glucose (sugar). This does not apply to pregnant women. Water, hot tea and black coffee (with nothing added) are okay. Do not drink other fluids, diet soda or chew gum.            Mar 30, 2018 11:40 AM CDT   (Arrive by 11:25 AM)   CT CHEST/ABDOMEN/PELVIS W CONTRAST with UCCT2   [facility] ([facility])    [address] [place] [address]   [phone]           Please bring any scans or X-rays taken at other hospitals, if similar tests were done. Also bring a list of your medicines, including vitamins, minerals and over-the-counter drugs. It is safest to leave personal items at home.  Be sure to tell your doctor:   If you have any allergies.   If there s any chance you are pregnant.   If you are breastfeeding.   If you have any special needs.  You may have contrast for this exam. To prepare:   Do not eat or drink for 2 hours before your exam. If you need to take medicine, you may take it with small sips of water. (We may ask you to take liquid medicine as well.)   The day before your exam, drink extra fluids at least six 8-ounce glasses (unless your doctor tells you to restrict your fluids).  Patients over 70 or patients with diabetes or kidney problems:   If you haven t had a blood test (creatinine test) within the last 30 days, go to your clinic or  Diagnostic Imaging Department for this test.  If you have diabetes:   If your kidney function is normal, continue taking your metformin (Avandamet, Glucophage, Glucovance, Metaglip) on the day of your exam.   If your kidney function is abnormal, wait 48 hours before restarting this medicine.  You will have oral contrast for this exam:   You will drink the contrast at home. Get this from your clinic or Diagnostic Imaging Department. Please follow the directions given.  Please wear loose clothing, such as a sweat suit or jogging clothes. Avoid snaps, zippers and other metal. We may ask you to undress and put on a hospital gown.  If you have any questions, please call the Imaging Department where you will have your exam.            Apr 02, 2018 10:45 AM CDT   (Arrive by 10:30 AM)   Return Visit with Francisco Gililam MD   [facility] ([facility])    [address] [place] [id number] [phone]            Jun 04, 2018  9:00 AM CDT   (Arrive by 8:45 AM)   Implanted Defibulator with Uc Cv Device 1   [facility] ([facility])    [address] [place] [id number] [phone]            Jun 05, 2018  5:00 PM CDT   (Arrive by 4:45 PM)   Return Vascular Visit with Frida Desai MD   [facility] ([facility])    [address] [place] [id number] [phone]              Who to contact     If you have questions or need follow up information about today's clinic visit or your schedule please contact [facility] directly at [phone].  Normal or non-critical lab and imaging results will be communicated to you by MyChart, letter or phone within 4 business days after the clinic has received the results. If you do not hear from us within 7 days, please contact the clinic through MyChart or phone. If you have a critical or  "abnormal lab result, we will notify you by phone as soon as possible.  Submit refill requests through [employer] or call your pharmacy and they will forward the refill request to us. Please allow 3 business days for your refill to be completed.          Additional Information About Your Visit        [facility] Information     [employer] gives you secure access to your electronic health record. If you see a primary care provider, you can also send messages to your care team and make appointments. If you have questions, please call your primary care clinic.  If you do not have a primary care provider, please call [phone] and they will assist you.        Care EveryWhere ID     This is your Care EveryWhere ID. This could be used by other organizations to access your [place] medical records  [id number]        Your Vitals Were     Pulse Height Pulse Oximetry BMI (Body Mass Index)          73 1.702 m (5' 7\") 96% 23.81 kg/m2         Blood Pressure from Last 3 Encounters:   12/05/17 130/66   12/04/17 117/58   11/13/17 122/67    Weight from Last 3 Encounters:   12/05/17 68.9 kg (152 lb)   12/04/17 68.5 kg (151 lb 1.6 oz)   11/13/17 69.3 kg (152 lb 11.2 oz)              Today, you had the following     No orders found for display         Today's Medication Changes          These changes are accurate as of: 12/5/17  5:32 PM.  If you have any questions, ask your nurse or doctor.               These medicines have changed or have updated prescriptions.        Dose/Directions    order for DME   This may have changed:  additional instructions   Used for:  PVD (peripheral vascular disease) (H), Ulcer of left lower extremity, limited to breakdown of skin (H)        20-30 mm Hg knee length compression stockings.  Measure and fit.  Style and color per patient preference.  Pat n Aracelis per patient need.   Quantity:  1 Units   Refills:  0            Where to get your medicines      Some of these will need a paper prescription and others " can be bought over the counter.  Ask your nurse if you have questions.     Bring a paper prescription for each of these medications     order for DME                Primary Care Provider Office Phone # Fax #    Roberto Sarmiento [phone]       [address] [place] [address]        Equal Access to Services     LEE CASTILLO : Hadii aad ku hadasho Soomaali, waaxda luqadaha, qaybta kaalmada adeegyada, waxay flavioin hayaan adeidalia wilcoxdafnemc minor. So [facility] [phone].    ATENCIÓN: Si habla español, tiene a aguirre disposición servicios gratuitos de asistencia lingüística. Lauren al [phone].    We comply with applicable federal civil rights laws and [place] laws. We do not discriminate on the basis of race, color, national origin, age, disability, sex, sexual orientation, or gender identity.            Thank you!     Thank you for choosing [facility]  for your care. Our goal is always to provide you with excellent care. Hearing back from our patients is one way we can continue to improve our services. Please take a few minutes to complete the written survey that you may receive in the mail after your visit with us. Thank you!             Your Updated Medication List - Protect others around you: Learn how to safely use, store and throw away your medicines at www.disposemymeds.org.          This list is accurate as of: 12/5/17  5:32 PM.  Always use your most recent med list.                   Brand Name Dispense Instructions for use Diagnosis    aspirin 81 MG tablet      Take 1 tablet by mouth daily.        atorvastatin 40 MG tablet    LIPITOR    90 tablet    Take 1 tablet (40 mg) by mouth daily    Hyperlipidemia, unspecified hyperlipidemia type       bacitracin ointment     28 g    Apply topically 2 times daily APPLY TO LEFT LEG TWO TIMES PER DAY    Left leg cellulitis       Blood Pressure Monitor Kit     1 kit    1 Units daily    Hypertension       CENTRUM SILVER per  tablet      Take 1 tablet by mouth daily. AM        ciclopirox 0.77 % cream    LOPROX    90 g    Apply topically 2 times daily To feet and toenails.    Dermatophytosis of nail, Tinea pedis of both feet       cyanocobalamin 1000 MCG tablet    vitamin  B-12    180 tablet    Take 2 tablets (2,000 mcg) by mouth daily    Anemia       doxazosin 2 MG tablet    CARDURA    90 tablet    Take 1/2 tab in the morning and 1/2 tab in the evening    Essential hypertension       fluticasone 50 MCG/ACT spray    FLONASE    3 Package    Spray 2 sprays into both nostrils daily    Unspecified sinusitis (chronic)       ketoconazole 2 % cream    NIZORAL    60 g    Apply topically daily On hold    Tinea corporis       loratadine 10 MG tablet    CLARITIN     Take 10 mg by mouth as needed        metoprolol 25 MG tablet    LOPRESSOR    180 tablet    Take 1 tablet (25 mg) by mouth 2 times daily    Coronary artery disease involving native heart without angina pectoris, unspecified vessel or lesion type       mirtazapine 15 MG tablet    REMERON     Take 15 mg by mouth At Bedtime.        order for DME     1 Units    20-30 mm Hg knee length compression stockings.  Measure and fit.  Style and color per patient preference.  Doff n Aracelis per patient need.    PVD (peripheral vascular disease) (H), Ulcer of left lower extremity, limited to breakdown of skin (H)       triamcinolone 0.1 % cream    KENALOG    80 g    Apply topically 2 times daily For up to two weeks in a row    Atopic eczema       warfarin 5 MG tablet    COUMADIN    35 tablet    7.5 mg on Wed; 5 mg all other days  or as instructed by coumadin clinic.    Atrial flutter (H)       ZOLOFT 100 MG tablet   Generic drug:  sertraline      Take 100 mg by mouth every evening.

## 2017-12-05 NOTE — PROGRESS NOTES
HPI:     This is an 82 year old male with PMH AFIB, CKD, CAD s/p CABG x 2 and JEREMY x 2 (patient of Dr. Kenyon), HTN, HLD, iCMY, AFLutter s/p ablation, VT, and CVI and DVT here for evaluation of his vascular disease. He is here with his pleasant wife who is providing a lot of the history as well.    Patient is a long standing , who played vigorously up until 80 years old from a young age. Only when he stopped playing tennis did he discover progressive swelling. He was prescribed compression stockings (20-30 mmHg) however these were too large for him and would not stay up appropriately. His swelling was off and on for 2 years. Has an open wound for which gets wound care and dressings. 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 He is chronically on warfarin and reports no bleeding/bruising.     Of note longstanding cardiology patient of Dr. Kenyon who is retiring. Therefore here to establish cardiac care as well. He denies any chest pain, shortness of breath. He has been well controlled in his AF and HTN with current medication regimen.        PAST MEDICAL HISTORY  Past Medical History:   Diagnosis Date     Advanced open-angle glaucoma      Anemia     post-op colectomy     Antiplatelet or antithrombotic long-term use      Arrhythmia      Arthritis     hands     Atrial fibrillation (H)      CKD (chronic kidney disease) stage 3, GFR 30-59 ml/min      CKD (chronic kidney disease), stage III 2005     Colon cancer (H)     Stage II-B colon cancer     Coronary artery disease     s/p CABG x 2, JEREMY x 2     Diverticulitis      History of blood transfusion      Hyperlipidemia      Hypertension      Ischemic cardiomyopathy      MGUS (monoclonal gammopathy of unknown significance)      Nonsenile cataract     BE     Pacemaker      Polymorphic ventricular tachycardia (H)      Polymyalgia rheumatica (H)      PVD (posterior vitreous detachment), both eyes 2005      S/P ablation of atrial flutter 6/20/14    CTI     Stented coronary artery      SVT (supraventricular tachycardia) (H)     PPM/AICD for NSVT     Thrombosis of leg     Left leg     Upper leg DVT (deep venous thromboembolism), chronic (H)     Left     Weight loss, unintentional 2017    15 lb in 4 months       CURRENT MEDICATIONS  Current Outpatient Prescriptions   Medication Sig Dispense Refill     order for DME Compression socks - knee  20-30 mm Hg  Has open left leg wound 1 Units 0     bacitracin ointment Apply topically 2 times daily APPLY TO LEFT LEG TWO TIMES PER DAY 28 g 0     warfarin (COUMADIN) 5 MG tablet 7.5 mg on Wed; 5 mg all other days  or as instructed by coumadin clinic. 35 tablet 5     Blood Pressure Monitor KIT 1 Units daily 1 kit 0     cyanocobalamin (VITAMIN  B-12) 1000 MCG tablet Take 2 tablets (2,000 mcg) by mouth daily 180 tablet 3     doxazosin (CARDURA) 2 MG tablet Take 1/2 tab in the morning and 1/2 tab in the evening 90 tablet 3     atorvastatin (LIPITOR) 40 MG tablet Take 1 tablet (40 mg) by mouth daily 90 tablet 3     metoprolol (LOPRESSOR) 25 MG tablet Take 1 tablet (25 mg) by mouth 2 times daily 180 tablet 3     ciclopirox (LOPROX) 0.77 % cream Apply topically 2 times daily To feet and toenails. 90 g 6     triamcinolone (KENALOG) 0.1 % cream Apply topically 2 times daily For up to two weeks in a row 80 g 3     ketoconazole (NIZORAL) 2 % cream Apply topically daily On hold 60 g 3     loratadine (CLARITIN) 10 MG tablet Take 10 mg by mouth as needed        fluticasone (FLONASE) 50 MCG/ACT nasal spray Spray 2 sprays into both nostrils daily 3 Package 0     Multiple Vitamins-Minerals (CENTRUM SILVER) per tablet Take 1 tablet by mouth daily. AM        aspirin 81 MG tablet Take 1 tablet by mouth daily.       mirtazapine (REMERON) 15 MG tablet Take 15 mg by mouth At Bedtime.       sertraline (ZOLOFT) 100 MG tablet Take 100 mg by mouth every evening.         PAST SURGICAL HISTORY:  Past Surgical  History:   Procedure Laterality Date     C CABG, ARTERY-VEIN, FOUR  2006    LIMA-LAD, SVG-Rt PDA, SVG-OM2, SVG-Diag 1     C CABG, VEIN, SINGLE  1994    1-vessel CABG, SVG->PDRCA      CATARACT IOL, RT/LT Bilateral      COLONOSCOPY  3/13/2014    Procedure: COMBINED COLONOSCOPY, SINGLE BIOPSY/POLYPECTOMY BY BIOPSY;;  Surgeon: Mary Gerber MD;  Location: [facility]     COLONOSCOPY N/A 6/22/2015    Procedure: COLONOSCOPY;  Surgeon: Marilin Newman MD;  Location: [facility]     H ABLATION ATRIAL FLUTTER       HEART CATH DRUG ELUTING STENT PLACEMENT  4/19/2012    JEREMY x 2 to LCx     IMPLANT IMPLANTABLE CARDIOVERTER DEFIBRILLATOR  5-    ppm/aicd     KNEE SURGERY      right and left knee surgeries     LAPAROSCOPIC ASSISTED COLECTOMY LEFT (DESCENDING)  4/8/2014    Procedure: LAPAROSCOPIC ASSISTED COLECTOMY LEFT (DESCENDING);  Hand assisted Laparoscopic Sigmoid Colectomy , Laparoscopic mobilization of spleenic flexure *Latex Allergy*Anesthesia General with Block;  Surgeon: Chanelle Guzmán MD;  Location: [facility] OR     REPAIR VALVE MITRAL  2006     30-mm Medtronic Julian ring      SELECTIVE LASER TRABECULOPLASTY (SLT) OD (RIGHT EYE)  4/10, 1/12,+1/9/13    RE     SELECTIVE LASER TRABECULOPLASTY (SLT) OS (LEFT EYE)  5/2012     SHOULDER SURGERY      right rotator cuff       ALLERGIES     Allergies   Allergen Reactions     Latex Rash     Rash       FAMILY HISTORY  Family History   Problem Relation Age of Onset     C.A.D. Father      Anesthesia Reaction No family hx of      Crohn Disease No family hx of      Ulcerative Colitis No family hx of      Cancer - colorectal No family hx of      Macular Degeneration No family hx of      CANCER No family hx of      No known family hx of skin cancer         SOCIAL HISTORY  Social History     Social History     Marital status:      Spouse name: N/A     Number of children: N/A     Years of education: N/A     Occupational History     Not on file.     Social History  "Main Topics     Smoking status: Former Smoker     Types: Cigarettes, Cigars     Quit date: 1/1/1968     Smokeless tobacco: Never Used     Alcohol use 0.0 oz/week     0 Standard drinks or equivalent per week      Comment: 2-3 drinks twice weekly     Drug use: No     Sexual activity: Not on file     Other Topics Concern     Not on file     Social History Narrative       ROS:   Constitutional: No fever, chills, or sweats. No weight gain/loss   ENT: No visual disturbance, ear ache, epistaxis, sore throat  Allergies/Immunologic: Negative  Respiratory: No cough, hemoptysia  Cardiovascular: As per HPI  GI: No nausea, vomiting, hematemesis, melena, or hematochezia  : No urinary frequency, dysuria, or hematuria  Integument: Negative  Psychiatric: Negative  Neuro: Negative  Endocrinology: Negative   Musculoskeletal: Negative  Vascular: No walking impairment, claudication, ischemic rest pain or nonhealing wounds    EXAM:  /66 (BP Location: Left arm, Patient Position: Chair, Cuff Size: Adult Small)  Pulse 73  Ht 1.702 m (5' 7\")  Wt 68.9 kg (152 lb)  SpO2 96%  BMI 23.81 kg/m2  In general, the patient is a pleasant male in no apparent distress.    HEENT: NC/AT.  PERRLA.  EOMI.  Sclerae white, not injected.  Nares clear.  Pharynx without erythema or exudate.  Dentition intact.    Neck: No adenopathy.  No thyromegaly. Carotids +2/2 bilaterally without bruits.  No jugular venous distension.   Heart: RRR. Normal S1, S2 splits physiologically. No murmur, rub, click, or gallop. The PMI is in the 5th ICS in the midclavicular line. There is no heave.    Lungs: CTA.  No ronchi, wheezes, rales.  No dullness to percussion.   Abdomen: Soft, nontender, nondistended. No organomegaly. No AAA.  No bruits.   Extremities: No clubbing, cyanosis. 1+ edema BL, stasis dermatitis. Wound left leg.  Vascular: No bruits are noted.    Labs:  LIPID RESULTS:  Lab Results   Component Value Date    CHOL 131 11/16/2016    HDL 52 11/16/2016    LDL " 56 11/16/2016    TRIG 114 11/16/2016    CHOLHDLRATIO 3.2 09/16/2015    NHDL 79 11/16/2016       LIVER ENZYME RESULTS:  Lab Results   Component Value Date    AST 12 11/04/2017    ALT 20 11/04/2017       CBC RESULTS:  Lab Results   Component Value Date    WBC 6.0 11/05/2017    RBC 3.17 (L) 11/05/2017    HGB 9.6 (L) 11/05/2017    HCT 30.5 (L) 11/05/2017    MCV 96 11/05/2017    MCH 30.3 11/05/2017    MCHC 31.5 11/05/2017    RDW 14.1 11/05/2017     11/05/2017       BMP RESULTS:  Lab Results   Component Value Date     11/05/2017    POTASSIUM 4.4 11/05/2017    CHLORIDE 113 (H) 11/05/2017    CO2 25 11/05/2017    ANIONGAP 6 11/05/2017    GLC 83 11/05/2017    BUN 28 11/05/2017    CR 1.12 11/05/2017    GFRESTIMATED 63 11/05/2017    GFRESTBLACK 76 11/05/2017    KEITH 8.2 (L) 11/05/2017        A1C RESULTS:  Lab Results   Component Value Date    A1C 5.7 02/27/2012       Procedures:    Impression:  1. Right leg: Incompetence of right common femoral vein, residual  right great saphenous vein in the proximal and mid thigh and  incompetent right saphenofemoral junction. The great saphenous vein  dimensions in this location may be from 2 to 6 mm. Incompetent   vein measuring 4 mm in the leg four centimeter from the  medial malleolus, which connects to a competent tributary of the great  saphenous vein. No varicose veins. Incidental right popliteal cyst.     2. Left leg: Incompetence of the common femoral vein, femoral vein in  the distal thigh and popliteal vein. Incompetent saphenofemoral  junction, greater saphenous vein in the proximal calf. The diameter of  the greater saphenous vein about the SFJ varies from 4.9 to 7.2 mm and  3 to 4.4 mm in the region of the knee/proximal calf. 2 incompetent  perforators in the lower leg, measuring up to 2.7 and 2.3 cm. No  varicose veins.     3. Please see detailed assessment of arterial vasculature as per  report 11/13/2017.      Duplex  1. Right leg: No evidence of  hemodynamically significant stenosis.  2. Left leg: Mildly elevated velocities in the mid SFA up to 211 cm/s,  indicating near 50% stenosis with biphasic distal waveforms. Abnormal  waveforms in the CFA are likely related to venous contamination given  lack of significant disease on the comparison CT from 9/26/17, and  relatively normal distal waveforms.       ABIs  Findings:  Right:    Arm: 115 mmHg   PT at ankle: 177 mmHg   DP at foot: 170 mmHg   DELFINO: 150   TBI: 1.56  Left:   Arm: 118 mmHg   PT at ankle: >254 mmHg   DP at foot: >254 mmHg   DELFINO: Noncompressible   TBI: 1.50  Impression:   Right leg: Resting DELFINO is 1.50, abnormal, >1.40 (Inconclusive  severity). This is likely related to calcified vessels as noted on  prior radiographs.  Left leg: Resting DELFINO is abnormal, >1.40 (Inconclusive severity). This  is likely related to calcified vessels as noted on prior radiographs.      Assessment and Plan:     #AFIB - continue metoprolol, coumadin    #CAD - no symptoms, s/p CABG and PCI x 2, continue on aspirin.     #DVT - on longterm warfarin, well controlled INR. No bleeding/bruising.    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 Swelling worsened due to more sedentary lifestyle from his prior tennis days. Will re-prescribe a 30-40 mmHg pressure and set up with medical supplier for fitting. Continue wound care for venous stasis (not likely to be arterial given recent vascular studies).    RTC in 6 months.     Total time spent 60 minutes, of which >50% was spent in face-to-face patient evaluation, reviewing data with patient, and coordination of care.     Frida Desai MD, MSC    Division of Cardiology  [facility]

## 2017-12-05 NOTE — NURSING NOTE
Chief Complaint   Patient presents with     New Patient     81 y/o male for bilateral LE venostasis     Vitals were taken and medications were reconciled.  Bimal Hager, ELIJAH  4:19 PM

## 2017-12-05 NOTE — LETTER
12/5/2017      RE: Noe Florence  [address] [facility] [address]       Dear Colleague,    Thank you for the opportunity to participate in the care of your patient, Noe Florence, at the [facility]. Please see a copy of my visit note below.    HPI:     This is an 82 year old male with PMH AFIB, CKD, CAD s/p CABG x 2 and JEREMY x 2 (patient of Dr. Kenyon), HTN, HLD, iCMY, AFLutter s/p ablation, VT, and CVI and DVT here for evaluation of his vascular disease. He is here with his pleasant wife who is providing a lot of the history as well.    Patient is a long standing , who played vigorously up until 80 years old from a young age. Only when he stopped playing tennis did he discover progressive swelling. He was prescribed compression stockings (20-30 mmHg) however these were too large for him and would not stay up appropriately. His swelling was off and on for 2 years. Has an open wound for which gets wound care and dressings. 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 He is chronically on warfarin and reports no bleeding/bruising.     Of note longstanding cardiology patient of Dr. Kenyon who is retiring. Therefore here to establish cardiac care as well. He denies any chest pain, shortness of breath. He has been well controlled in his AF and HTN with current medication regimen.        PAST MEDICAL HISTORY  Past Medical History:   Diagnosis Date     Advanced open-angle glaucoma      Anemia     post-op colectomy     Antiplatelet or antithrombotic long-term use      Arrhythmia      Arthritis     hands     Atrial fibrillation (H)      CKD (chronic kidney disease) stage 3, GFR 30-59 ml/min      CKD (chronic kidney disease), stage III 2005     Colon cancer (H)     Stage II-B colon cancer     Coronary artery disease     s/p CABG x 2, JEREMY x 2     Diverticulitis      History of blood  transfusion      Hyperlipidemia      Hypertension      Ischemic cardiomyopathy      MGUS (monoclonal gammopathy of unknown significance)      Nonsenile cataract     BE     Pacemaker      Polymorphic ventricular tachycardia (H)      Polymyalgia rheumatica (H)      PVD (posterior vitreous detachment), both eyes 2005     S/P ablation of atrial flutter 6/20/14    CTI     Stented coronary artery      SVT (supraventricular tachycardia) (H)     PPM/AICD for NSVT     Thrombosis of leg     Left leg     Upper leg DVT (deep venous thromboembolism), chronic (H)     Left     Weight loss, unintentional 2017    15 lb in 4 months       CURRENT MEDICATIONS  Current Outpatient Prescriptions   Medication Sig Dispense Refill     order for DME Compression socks - knee  20-30 mm Hg  Has open left leg wound 1 Units 0     bacitracin ointment Apply topically 2 times daily APPLY TO LEFT LEG TWO TIMES PER DAY 28 g 0     warfarin (COUMADIN) 5 MG tablet 7.5 mg on Wed; 5 mg all other days  or as instructed by coumadin clinic. 35 tablet 5     Blood Pressure Monitor KIT 1 Units daily 1 kit 0     cyanocobalamin (VITAMIN  B-12) 1000 MCG tablet Take 2 tablets (2,000 mcg) by mouth daily 180 tablet 3     doxazosin (CARDURA) 2 MG tablet Take 1/2 tab in the morning and 1/2 tab in the evening 90 tablet 3     atorvastatin (LIPITOR) 40 MG tablet Take 1 tablet (40 mg) by mouth daily 90 tablet 3     metoprolol (LOPRESSOR) 25 MG tablet Take 1 tablet (25 mg) by mouth 2 times daily 180 tablet 3     ciclopirox (LOPROX) 0.77 % cream Apply topically 2 times daily To feet and toenails. 90 g 6     triamcinolone (KENALOG) 0.1 % cream Apply topically 2 times daily For up to two weeks in a row 80 g 3     ketoconazole (NIZORAL) 2 % cream Apply topically daily On hold 60 g 3     loratadine (CLARITIN) 10 MG tablet Take 10 mg by mouth as needed        fluticasone (FLONASE) 50 MCG/ACT nasal spray Spray 2 sprays into both nostrils daily 3 Package 0     Multiple  Vitamins-Minerals (CENTRUM SILVER) per tablet Take 1 tablet by mouth daily. AM        aspirin 81 MG tablet Take 1 tablet by mouth daily.       mirtazapine (REMERON) 15 MG tablet Take 15 mg by mouth At Bedtime.       sertraline (ZOLOFT) 100 MG tablet Take 100 mg by mouth every evening.         PAST SURGICAL HISTORY:  Past Surgical History:   Procedure Laterality Date     C CABG, ARTERY-VEIN, FOUR  2006    LIMA-LAD, SVG-Rt PDA, SVG-OM2, SVG-Diag 1     C CABG, VEIN, SINGLE  1994    1-vessel CABG, SVG->PDRCA      CATARACT IOL, RT/LT Bilateral      COLONOSCOPY  3/13/2014    Procedure: COMBINED COLONOSCOPY, SINGLE BIOPSY/POLYPECTOMY BY BIOPSY;;  Surgeon: Mary Gerber MD;  Location:  GI     COLONOSCOPY N/A 6/22/2015    Procedure: COLONOSCOPY;  Surgeon: Marilin Newman MD;  Location:  GI     H ABLATION ATRIAL FLUTTER       HEART CATH DRUG ELUTING STENT PLACEMENT  4/19/2012    JEREMY x 2 to LCx     IMPLANT IMPLANTABLE CARDIOVERTER DEFIBRILLATOR  5-    ppm/aicd     KNEE SURGERY      right and left knee surgeries     LAPAROSCOPIC ASSISTED COLECTOMY LEFT (DESCENDING)  4/8/2014    Procedure: LAPAROSCOPIC ASSISTED COLECTOMY LEFT (DESCENDING);  Hand assisted Laparoscopic Sigmoid Colectomy , Laparoscopic mobilization of spleenic flexure *Latex Allergy*Anesthesia General with Block;  Surgeon: Chanelle Guzmán MD;  Location:  OR     REPAIR VALVE MITRAL  2006     30-mm Medtronic Julian ring      SELECTIVE LASER TRABECULOPLASTY (SLT) OD (RIGHT EYE)  4/10, 1/12,+1/9/13    RE     SELECTIVE LASER TRABECULOPLASTY (SLT) OS (LEFT EYE)  5/2012     SHOULDER SURGERY      right rotator cuff       ALLERGIES     Allergies   Allergen Reactions     Latex Rash     Rash       FAMILY HISTORY  Family History   Problem Relation Age of Onset     C.A.D. Father      Anesthesia Reaction No family hx of      Crohn Disease No family hx of      Ulcerative Colitis No family hx of      Cancer - colorectal No family hx of   "    Macular Degeneration No family hx of      CANCER No family hx of      No known family hx of skin cancer         SOCIAL HISTORY  Social History     Social History     Marital status:      Spouse name: N/A     Number of children: N/A     Years of education: N/A     Occupational History     Not on file.     Social History Main Topics     Smoking status: Former Smoker     Types: Cigarettes, Cigars     Quit date: 1/1/1968     Smokeless tobacco: Never Used     Alcohol use 0.0 oz/week     0 Standard drinks or equivalent per week      Comment: 2-3 drinks twice weekly     Drug use: No     Sexual activity: Not on file     Other Topics Concern     Not on file     Social History Narrative       ROS:   Constitutional: No fever, chills, or sweats. No weight gain/loss   ENT: No visual disturbance, ear ache, epistaxis, sore throat  Allergies/Immunologic: Negative  Respiratory: No cough, hemoptysia  Cardiovascular: As per HPI  GI: No nausea, vomiting, hematemesis, melena, or hematochezia  : No urinary frequency, dysuria, or hematuria  Integument: Negative  Psychiatric: Negative  Neuro: Negative  Endocrinology: Negative   Musculoskeletal: Negative  Vascular: No walking impairment, claudication, ischemic rest pain or nonhealing wounds    EXAM:  /66 (BP Location: Left arm, Patient Position: Chair, Cuff Size: Adult Small)  Pulse 73  Ht 1.702 m (5' 7\")  Wt 68.9 kg (152 lb)  SpO2 96%  BMI 23.81 kg/m2  In general, the patient is a pleasant male in no apparent distress.    HEENT: NC/AT.  PERRLA.  EOMI.  Sclerae white, not injected.  Nares clear.  Pharynx without erythema or exudate.  Dentition intact.    Neck: No adenopathy.  No thyromegaly. Carotids +2/2 bilaterally without bruits.  No jugular venous distension.   Heart: RRR. Normal S1, S2 splits physiologically. No murmur, rub, click, or gallop. The PMI is in the 5th ICS in the midclavicular line. There is no heave.    Lungs: CTA.  No ronchi, wheezes, rales.  No " dullness to percussion.   Abdomen: Soft, nontender, nondistended. No organomegaly. No AAA.  No bruits.   Extremities: No clubbing, cyanosis. 1+ edema BL, stasis dermatitis. Wound left leg.  Vascular: No bruits are noted.    Labs:  LIPID RESULTS:  Lab Results   Component Value Date    CHOL 131 11/16/2016    HDL 52 11/16/2016    LDL 56 11/16/2016    TRIG 114 11/16/2016    CHOLHDLRATIO 3.2 09/16/2015    NHDL 79 11/16/2016       LIVER ENZYME RESULTS:  Lab Results   Component Value Date    AST 12 11/04/2017    ALT 20 11/04/2017       CBC RESULTS:  Lab Results   Component Value Date    WBC 6.0 11/05/2017    RBC 3.17 (L) 11/05/2017    HGB 9.6 (L) 11/05/2017    HCT 30.5 (L) 11/05/2017    MCV 96 11/05/2017    MCH 30.3 11/05/2017    MCHC 31.5 11/05/2017    RDW 14.1 11/05/2017     11/05/2017       BMP RESULTS:  Lab Results   Component Value Date     11/05/2017    POTASSIUM 4.4 11/05/2017    CHLORIDE 113 (H) 11/05/2017    CO2 25 11/05/2017    ANIONGAP 6 11/05/2017    GLC 83 11/05/2017    BUN 28 11/05/2017    CR 1.12 11/05/2017    GFRESTIMATED 63 11/05/2017    GFRESTBLACK 76 11/05/2017    KEITH 8.2 (L) 11/05/2017        A1C RESULTS:  Lab Results   Component Value Date    A1C 5.7 02/27/2012       Procedures:    Impression:  1. Right leg: Incompetence of right common femoral vein, residual  right great saphenous vein in the proximal and mid thigh and  incompetent right saphenofemoral junction. The great saphenous vein  dimensions in this location may be from 2 to 6 mm. Incompetent   vein measuring 4 mm in the leg four centimeter from the  medial malleolus, which connects to a competent tributary of the great  saphenous vein. No varicose veins. Incidental right popliteal cyst.     2. Left leg: Incompetence of the common femoral vein, femoral vein in  the distal thigh and popliteal vein. Incompetent saphenofemoral  junction, greater saphenous vein in the proximal calf. The diameter of  the greater saphenous vein  about the SFJ varies from 4.9 to 7.2 mm and  3 to 4.4 mm in the region of the knee/proximal calf. 2 incompetent  perforators in the lower leg, measuring up to 2.7 and 2.3 cm. No  varicose veins.     3. Please see detailed assessment of arterial vasculature as per  report 11/13/2017.      Duplex  1. Right leg: No evidence of hemodynamically significant stenosis.  2. Left leg: Mildly elevated velocities in the mid SFA up to 211 cm/s,  indicating near 50% stenosis with biphasic distal waveforms. Abnormal  waveforms in the CFA are likely related to venous contamination given  lack of significant disease on the comparison CT from 9/26/17, and  relatively normal distal waveforms.       ABIs  Findings:  Right:    Arm: 115 mmHg   PT at ankle: 177 mmHg   DP at foot: 170 mmHg   DELFINO: 150   TBI: 1.56  Left:   Arm: 118 mmHg   PT at ankle: >254 mmHg   DP at foot: >254 mmHg   DELFINO: Noncompressible   TBI: 1.50  Impression:   Right leg: Resting DELFINO is 1.50, abnormal, >1.40 (Inconclusive  severity). This is likely related to calcified vessels as noted on  prior radiographs.  Left leg: Resting DELFINO is abnormal, >1.40 (Inconclusive severity). This  is likely related to calcified vessels as noted on prior radiographs.      Assessment and Plan:     #AFIB - continue metoprolol, coumadin    #CAD - no symptoms, s/p CABG and PCI x 2, continue on aspirin.     #DVT - on longterm warfarin, well controlled INR. No bleeding/bruising.    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 Swelling worsened due to more sedentary lifestyle from his prior tennis days. Will re-prescribe a 30-40 mmHg pressure and set up with medical supplier for fitting. Continue wound care for venous stasis (not likely to be arterial given recent vascular studies).    RTC in 6 months.     Total time spent 60 minutes, of which >50% was spent in face-to-face patient evaluation, reviewing data with  patient, and coordination of care.     Frida Desai MD, MSC    Division of Cardiology  [facility]

## 2017-12-06 DIAGNOSIS — Z79.01 LONG-TERM (CURRENT) USE OF ANTICOAGULANTS: ICD-10-CM

## 2017-12-06 DIAGNOSIS — I48.91 ATRIAL FIBRILLATION, UNSPECIFIED TYPE (H): ICD-10-CM

## 2017-12-06 LAB — INR PPP: 2.98 (ref 0.86–1.14)

## 2017-12-07 ENCOUNTER — ANTICOAGULATION THERAPY VISIT (OUTPATIENT)
Dept: ANTICOAGULATION | Facility: CLINIC | Age: 82
End: 2017-12-07

## 2017-12-07 DIAGNOSIS — I48.0 PAROXYSMAL ATRIAL FIBRILLATION (H): ICD-10-CM

## 2017-12-07 DIAGNOSIS — Z79.01 LONG-TERM (CURRENT) USE OF ANTICOAGULANTS: ICD-10-CM

## 2017-12-07 NOTE — PROGRESS NOTES
ANTICOAGULATION FOLLOW-UP CLINIC VISIT    Patient Name:  Noe Florence  Date:  12/7/2017  Contact Type:  Telephone    SUBJECTIVE:     Patient Findings     Comments Recommended patient take 2.5mg one time, then resume 5mg daily.  Will recheck INR on 12/18 at his appt. With Dr. Kenyon.           OBJECTIVE    INR   Date Value Ref Range Status   12/06/2017 2.98 (H) 0.86 - 1.14 Final       ASSESSMENT / PLAN  INR assessment THER    Recheck INR In: 2 WEEKS    INR Location Clinic      Anticoagulation Summary as of 12/7/2017     INR goal 2.0-3.0   Today's INR 2.98 (12/6/2017)   Maintenance plan No maintenance plan   Full instructions 12/7: 2.5 mg; 12/8: 5 mg; 12/9: 5 mg; 12/10: 5 mg; 12/11: 5 mg; 12/12: 5 mg; 12/13: 5 mg; 12/14: 5 mg; 12/15: 5 mg; 12/16: 5 mg; 12/17: 5 mg; 12/18: 5 mg; 12/19: 5 mg; 12/20: 5 mg   Plan last modified Mark Ho, [facility] (11/9/2017)   Next INR check 12/21/2017   Priority INR   Target end date Indefinite    Indications   Atrial fibrillation (H) [I48.91] [I48.91]  Long-term (current) use of anticoagulants [Z79.01] [Z79.01]         Anticoagulation Episode Summary     INR check location     Preferred lab     Send INR reminders to [facility]    Comments Patient contact number: [phone]   Can leave results with Rica (friend)      Anticoagulation Care Providers     Provider Role Specialty Phone number    Deedee Baird MD [facility] Cardiology [phone]            See the Encounter Report to view Anticoagulation Flowsheet and Dosing Calendar (Go to Encounters tab in chart review, and find the Anticoagulation Therapy Visit)    Spoke with Rica. Gave them their lab results and new warfarin recommendation.  No changes in health, medication, or diet. No missed doses, no falls. No signs or symptoms of bleed or clotting.    Ciro Sharp RN

## 2017-12-07 NOTE — MR AVS SNAPSHOT
Noe Florence   12/7/2017   Anticoagulation Therapy Visit    Description:  82 year old male   Provider:  Ciro Sharp, RN   Department:  [facility]           INR as of 12/7/2017     Today's INR 2.98 (12/6/2017)      Anticoagulation Summary as of 12/7/2017     INR goal 2.0-3.0   Today's INR 2.98 (12/6/2017)   Full instructions 12/7: 2.5 mg; 12/8: 5 mg; 12/9: 5 mg; 12/10: 5 mg; 12/11: 5 mg; 12/12: 5 mg; 12/13: 5 mg; 12/14: 5 mg; 12/15: 5 mg; 12/16: 5 mg; 12/17: 5 mg; 12/18: 5 mg; 12/19: 5 mg; 12/20: 5 mg   Next INR check 12/21/2017    Indications   Atrial fibrillation (H) [I48.91] [I48.91]  Long-term (current) use of anticoagulants [Z79.01] [Z79.01]         December 2017 Details    Sun Mon Tue Wed Thu Fri Sat          1               2                 3               4               5               6               7      2.5 mg   See details      8      5 mg         9      5 mg           10      5 mg         11      5 mg         12      5 mg         13      5 mg         14      5 mg         15      5 mg         16      5 mg           17      5 mg         18      5 mg         19      5 mg         20      5 mg         21            22               23                 24               25               26               27               28               29               30                 31                      Date Details   12/07 This INR check       Date of next INR:  12/21/2017         How to take your warfarin dose     To take:  2.5 mg Take 0.5 of a 5 mg tablet.    To take:  5 mg Take 1 of the 5 mg tablets.

## 2017-12-18 ENCOUNTER — PRE VISIT (OUTPATIENT)
Dept: CARDIOLOGY | Facility: CLINIC | Age: 82
End: 2017-12-18

## 2017-12-18 NOTE — TELEPHONE ENCOUNTER
Echo- 3/27/2014  Interpretation Summary  Global left ventricular function is normal with an EF of 55-60%. There is   hypokinesis of the infero-basal, katarzyna-septum and the apical anterior wall.   Severe right ventricular dilation is present. Global right ventricular   function is mildly to moderately reduced. Prior mitral valve repair cannot be   excluded. There is evidence of mild to moderate mitral regurgitation. Mean   inflow gradient of 6 mm Hg. Moderate to severe tricuspid insufficiency is   present. The inferior vena cava was normal in size with preserved respiratory   variability. No pericardial effusion is present.        USLEMA: 9/8/2014  Interpretation Summary  The left atrial appendage is normal. It is free of spontaneous echo contrast and thrombus. The left atrial appendage Doppler velocities are low. Borderline (EF 50-55%) reduced left ventricular function is present. Normal right ventricular function.        CT Coronary Angiogram: 6/19/2014  IMPRESSION:  1. Normal pulmonary venous anatomy with all pulmonary veins draining  into the left atrium  2. Severe calcification noted in proximal major epicardial vessels  with occlusion of the right coronary artery and subtotal occlusion  versus severe stenosis of of the mid left anterior descending artery  and the mid circumflex coronary artery. Further characterization of  the epicardial vessels is not possible as this study was not timed for  optimal visualization of coronary artery anatomy. Patent saphenous  vein graft to the right coronary artery, patent saphenous vein graft  to the obtuse marginal, there also appears to be a left internal  mammary artery to the LAD which appears to be patent in the visualized  field-of-view.   CCL: 9-   70 yo man with known CAD (s/p 1-vessel CABG, SVG->PDRCA in 1994) presents for coronary angiography following an abnormal stress test (fixed defect with partial reversibility in the inferior and inferolateral segments  with RWMA in those areas). Access: 4FRFA. Coronary angiography: Right dominant system. LMCA: 30% dz. LAD: Eccentric, calcified stenosis crossing D1 (60%-70%); 60-70% stenosis just prior to small D2. D1 has a long 50-60% stenosis. Mild dz in the remainder of the vessel. LCx: Proximal 80% stenosis crossing origin of OM1. Mild-moderate diffuse dz in the remainder of the vessel. RCA: 100% occluded at the ostium. Bypass graft angiography: SVG->RPDA cannulated with RCV catheter. The graft has slow flow, with an 80-90% stenosis at the touchdown point on the PDRCA. Nonselective injection of left subclavian: LIMA patent with runoff into the abdomen. LVgram: Basal akinesis. Inferior segment is mildly hypokinetic. Visually estimated LVEF 55%. No gradient across the AV on pullback. Impression:   1) 3-vessel CAD with non-significant LMCA lesion (30%) and complicated moderate-severe dz in the LAD.   2) Patent SVG->PDRCA with significant (80-90%) stenosis at the touchdown point.   3) Normal LV function.      SURGICAL PROCEDURES: CABG x 4: 10-6-2006  Redo coronary artery bypass grafting x4 with a left internal mammary artery to left anterior descending artery, saphenous vein graft to the distal posterior descending artery, saphenous vein graft to the obtuse marginal two artery branch, saphenous vein graft to the first diagonal artery branch. 3. Mitral valve repair with resection of the prolapsed leaflet and annuloplasty with a 30-mm Medtronic CG ring. 4. Placement of atrial and ventricular epicardial pacing leads for postoperative pacing. SURGEON: Kemar Yanes MD.    CCL: 4-   75 yo male with history of CABG x 2 (RCA then LIMA LAD and SVG OM, SVG Diagonal in 2006) presented to the ED with acute dyspnea and EKG changes. Access: RFA L radial (for TACHO cathter) Catheters JL4 4Fr JR4 4Fr 3DRC 4Fr (for TACHO) Coronary angiography: Left main: ostial 20% and distal 30% stenosis. LAD Diffusely diseased, gives a large D1 and small  D2. The vessel is occluded in the mid-segment and competitive flow from the TACHO is seen. LCx: there is 70% long proximal stenosis, it gives 2 OM's which both have competitive flow, the distal vessel initially appeared small (was seen to be 80% stenosed after JEREMY to the proximal LCx, see interventional section for details). Ramus Intermedius: large RI with mild ostial stenosis and diffuse luminal irregularities. RCA: Dominant vessel. Proximally occluded. L to R collaterals to rPDA and rPLA. Grafts:   TACHO difficult to cannulate; accessed left radial and accessed with 3DRC. LIMA touchdown to midLAD is patent. SVG to rPDA is patent. SVG to OM 1 and 2 is patent. There is 50% stenosis proximal to the touchdowns in both OM and some backfilling of the distal LCx. SVG to the diagonal is occluded. Intervention: JEREMY to the proximal and distal left circumflex artery. Conclusion 1. 3 vessel coronary artery disease without significant left main lesion. 2. Successful angioplasty of the native left circumflex artery. lifelong aspirin therapy.      PROCEDURES PERFORMED: 5-   1. Dual-chamber implantable cardioverter-defibrillator insertion.   2. Intraoperative defibrillator threshold testing.   Cardiologist: Marito

## 2017-12-19 DIAGNOSIS — E78.5 HYPERLIPIDEMIA, UNSPECIFIED HYPERLIPIDEMIA TYPE: ICD-10-CM

## 2017-12-20 ENCOUNTER — PRE VISIT (OUTPATIENT)
Dept: CARDIOLOGY | Facility: CLINIC | Age: 82
End: 2017-12-20

## 2017-12-21 ENCOUNTER — OFFICE VISIT (OUTPATIENT)
Dept: CARDIOLOGY | Facility: CLINIC | Age: 82
End: 2017-12-21
Attending: INTERNAL MEDICINE
Payer: COMMERCIAL

## 2017-12-21 ENCOUNTER — ANTICOAGULATION THERAPY VISIT (OUTPATIENT)
Dept: ANTICOAGULATION | Facility: CLINIC | Age: 82
End: 2017-12-21

## 2017-12-21 VITALS
DIASTOLIC BLOOD PRESSURE: 64 MMHG | BODY MASS INDEX: 23.61 KG/M2 | HEIGHT: 67 IN | HEART RATE: 61 BPM | WEIGHT: 150.4 LBS | SYSTOLIC BLOOD PRESSURE: 105 MMHG | OXYGEN SATURATION: 97 %

## 2017-12-21 DIAGNOSIS — Z79.01 LONG-TERM (CURRENT) USE OF ANTICOAGULANTS: ICD-10-CM

## 2017-12-21 DIAGNOSIS — I25.10 CORONARY ARTERY DISEASE INVOLVING NATIVE CORONARY ARTERY, ANGINA PRESENCE UNSPECIFIED, UNSPECIFIED WHETHER NATIVE OR TRANSPLANTED HEART: Primary | ICD-10-CM

## 2017-12-21 DIAGNOSIS — I48.0 PAROXYSMAL ATRIAL FIBRILLATION (H): ICD-10-CM

## 2017-12-21 DIAGNOSIS — I48.91 ATRIAL FIBRILLATION, UNSPECIFIED TYPE (H): ICD-10-CM

## 2017-12-21 LAB — INR PPP: 2.57 (ref 0.86–1.14)

## 2017-12-21 PROCEDURE — 99212 OFFICE O/P EST SF 10 MIN: CPT | Mod: ZF

## 2017-12-21 PROCEDURE — 99214 OFFICE O/P EST MOD 30 MIN: CPT | Mod: GC | Performed by: INTERNAL MEDICINE

## 2017-12-21 ASSESSMENT — PAIN SCALES - GENERAL: PAINLEVEL: NO PAIN (0)

## 2017-12-21 NOTE — PATIENT INSTRUCTIONS
You were seen today in the Cardiovascular Clinic at the [facility].      Cardiology Providers you saw during your visit:  Dr. Kenyon    Recommendations:  Follow up with Dr. Desai in one year      Nursing questions: [phone] option 1 then chose option 3 for the triage nurse, and then ask to speak with Jasmin.  For emergencies call 911.    It was a pleasure to see you in clinic.  If you have any questions at all, please feel free to give me a call.      Jasmin Oneal RN  Structural Heart Care Coordinator   TAVR and MitraClip Programs  [facility]  Office: [phone]    [facility]  [address]  Cardiology Clinic [address]  [place] [address]

## 2017-12-21 NOTE — NURSING NOTE
Return Appointment: Patient given instructions regarding scheduling next clinic visit. Patient demonstrated understanding of this information and agreed to call with further questions or concerns.  Patient stated he understood all health information given and agreed to call with further questions or concerns.

## 2017-12-21 NOTE — MR AVS SNAPSHOT
After Visit Summary   12/21/2017    Noe Florence    MRN: [id number]           Patient Information     Date Of Birth          1935        Visit Information        Provider Department      12/21/2017 8:30 AM Tiffany Kenyon MD [facility]        Today's Diagnoses     CAD (coronary artery disease)    -  1      Care Instructions    You were seen today in the Cardiovascular Clinic at the [facility].      Cardiology Providers you saw during your visit:  Dr. Kenyon    Recommendations:  Follow up with Dr. Desai in one year      Nursing questions: [phone] option 1 then chose option 3 for the triage nurse, and then ask to speak with Jasmin.  For emergencies call 911.    It was a pleasure to see you in clinic.  If you have any questions at all, please feel free to give me a call.      Jasmin Oneal RN  Structural Heart Care Coordinator   TAVR and MitraClip Programs  [facility]  Office: [phone]    Clinics and Surgery Center  [address] [place] [address]  Cardiology Clinic [address] [address]              Follow-ups after your visit        Additional Services     Follow-Up with Cardiologist                 Your next 10 appointments already scheduled     Quentin 15, 2018  9:40 AM CST   (Arrive by 9:25 AM)   PHYSICAL with Roberto Sarmiento MD   [facility] ([facility] [place])    [address] [place] [address]  4th [facility] [address] [phone]            Feb 12, 2018  3:30 PM CST   (Arrive by 3:15 PM)   Return Visit with Lashawn Jackson MD   [facility] Dermatology ([facility])    [address] [place] [address]  3rd [facility] [id number] [phone]            Mar 30, 2018 11:15 AM CDT   LAB with  LAB   [facility] Lab ([facility])    [address] [place] [address]  1st [facility] [address] [phone]           Please do not eat  10-12 hours before your appointment if you are coming in fasting for labs on lipids, cholesterol, or glucose (sugar). This does not apply to pregnant women. Water, hot tea and black coffee (with nothing added) are okay. Do not drink other fluids, diet soda or chew gum.            Mar 30, 2018 11:40 AM CDT   (Arrive by 11:25 AM)   CT CHEST/ABDOMEN/PELVIS W CONTRAST with UCCT2   [facility] [address] [place] [address] ([facility])    [address] [address] [phone]           Please bring any scans or X-rays taken at other hospitals, if similar tests were done. Also bring a list of your medicines, including vitamins, minerals and over-the-counter drugs. It is safest to leave personal items at home.  Be sure to tell your doctor:   If you have any allergies.   If there s any chance you are pregnant.   If you are breastfeeding.   If you have any special needs.  You may have contrast for this exam. To prepare:   Do not eat or drink for 2 hours before your exam. If you need to take medicine, you may take it with small sips of water. (We may ask you to take liquid medicine as well.)   The day before your exam, drink extra fluids at least six 8-ounce glasses (unless your doctor tells you to restrict your fluids).  Patients over 70 or patients with diabetes or kidney problems:   If you haven t had a blood test (creatinine test) within the last 30 days, go to your clinic or Diagnostic Imaging Department for this test.  If you have diabetes:   If your kidney function is normal, continue taking your metformin (Avandamet, Glucophage, Glucovance, Metaglip) on the day of your exam.   If your kidney function is abnormal, wait 48 hours before restarting this medicine.  You will have oral contrast for this exam:   You will drink the contrast at home. Get this from your clinic or Diagnostic Imaging Department. Please follow the directions given.  Please wear loose clothing, such as a  sweat suit or jogging clothes. Avoid snaps, zippers and other metal. We may ask you to undress and put on a hospital gown.  If you have any questions, please call the Imaging Department where you will have your exam.            Apr 02, 2018 10:45 AM CDT   (Arrive by 10:30 AM)   Return Visit with Francisco Gilliam MD   [facility] ([facility])    [address]  2nd Floor  [facility] [address] [phone]            Jun 04, 2018  9:00 AM CDT   (Arrive by 8:45 AM)   Implanted Defibulator with Uc Cv Device 1   [facility] ([facility])    [address]  3rd [facility] [id number] [phone]            Jun 05, 2018  5:00 PM CDT   (Arrive by 4:45 PM)   Return Vascular Visit with Frida Desai MD   [facility] ([facility])    [address]  3rd [facility] [id number] [phone]              Future tests that were ordered for you today     Open Future Orders        Priority Expected Expires Ordered    Follow-Up with Cardiologist Routine 12/21/2018 12/22/2018 12/21/2017            Who to contact     If you have questions or need follow up information about today's clinic visit or your schedule please contact [facility] directly at [phone].  Normal or non-critical lab and imaging results will be communicated to you by MyChart, letter or phone within 4 business days after the clinic has received the results. If you do not hear from us within 7 days, please contact the clinic through MyChart or phone. If you have a critical or abnormal lab result, we will notify you by phone as soon as possible.  Submit refill requests through [employer] or call your pharmacy and they will forward the refill request to us. Please allow 3 business days for your refill to be completed.          Additional Information About Your Visit        MyChart Information      "[employer] gives you secure access to your electronic health record. If you see a primary care provider, you can also send messages to your care team and make appointments. If you have questions, please call your primary care clinic.  If you do not have a primary care provider, please call [phone] and they will assist you.        Care EveryWhere ID     This is your Care EveryWhere ID. This could be used by other organizations to access your [place] medical records  [id number]        Your Vitals Were     Pulse Height Pulse Oximetry BMI (Body Mass Index)          61 1.702 m (5' 7\") 97% 23.56 kg/m2         Blood Pressure from Last 3 Encounters:   12/21/17 105/64   12/05/17 130/66   12/04/17 117/58    Weight from Last 3 Encounters:   12/21/17 68.2 kg (150 lb 6.4 oz)   12/05/17 68.9 kg (152 lb)   12/04/17 68.5 kg (151 lb 1.6 oz)               Primary Care Provider Office Phone # Fax #    Roberto Sarmiento [phone]       [address] [place] [address]  [facility] [address]        Equal Access to Services     REGINALD CASTILLO AH: Hadii aad ku hadasho Soarnoldali, waaxda luqadaha, qaybta kaalmada adeegyada, sil minor. So [facility] [phone].    ATENCIÓN: Si habla español, tiene a aguirre disposición servicios gratuitos de asistencia lingüística. [facility] [phone].    We comply with applicable federal civil rights laws and [place] laws. We do not discriminate on the basis of race, color, national origin, age, disability, sex, sexual orientation, or gender identity.            Thank you!     Thank you for choosing [facility]  for your care. Our goal is always to provide you with excellent care. Hearing back from our patients is one way we can continue to improve our services. Please take a few minutes to complete the written survey that you may receive in the mail after your visit with us. Thank you!             Your Updated Medication List - Protect others around you: " Learn how to safely use, store and throw away your medicines at www.disposemymeds.org.          This list is accurate as of: 12/21/17  9:22 AM.  Always use your most recent med list.                   Brand Name Dispense Instructions for use Diagnosis    aspirin 81 MG tablet      Take 1 tablet by mouth daily.        atorvastatin 40 MG tablet    LIPITOR    90 tablet    Take 1 tablet (40 mg) by mouth daily    Hyperlipidemia, unspecified hyperlipidemia type       bacitracin ointment     28 g    Apply topically 2 times daily APPLY TO LEFT LEG TWO TIMES PER DAY    Left leg cellulitis       Blood Pressure Monitor Kit     1 kit    1 Units daily    Hypertension       CENTRUM SILVER per tablet      Take 1 tablet by mouth daily. AM        ciclopirox 0.77 % cream    LOPROX    90 g    Apply topically 2 times daily To feet and toenails.    Dermatophytosis of nail, Tinea pedis of both feet       cyanocobalamin 1000 MCG tablet    vitamin  B-12    180 tablet    Take 2 tablets (2,000 mcg) by mouth daily    Anemia       doxazosin 2 MG tablet    CARDURA    90 tablet    Take 1/2 tab in the morning and 1/2 tab in the evening    Essential hypertension       fluticasone 50 MCG/ACT spray    FLONASE    3 Package    Spray 2 sprays into both nostrils daily    Unspecified sinusitis (chronic)       ketoconazole 2 % cream    NIZORAL    60 g    Apply topically daily On hold    Tinea corporis       loratadine 10 MG tablet    CLARITIN     Take 10 mg by mouth as needed        metoprolol 25 MG tablet    LOPRESSOR    180 tablet    Take 1 tablet (25 mg) by mouth 2 times daily    Coronary artery disease involving native heart without angina pectoris, unspecified vessel or lesion type       mirtazapine 15 MG tablet    REMERON     Take 15 mg by mouth At Bedtime.        order for DME     1 Units    20-30 mm Hg knee length compression stockings.  Measure and fit.  Style and color per patient preference.  Pat Hsu per patient need.    PVD (peripheral  vascular disease) (H), Ulcer of left lower extremity, limited to breakdown of skin (H)       triamcinolone 0.1 % cream    KENALOG    80 g    Apply topically 2 times daily For up to two weeks in a row    Atopic eczema       warfarin 5 MG tablet    COUMADIN    35 tablet    7.5 mg on Wed; 5 mg all other days  or as instructed by coumadin clinic.    Atrial flutter (H)       ZOLOFT 100 MG tablet   Generic drug:  sertraline      Take 100 mg by mouth every evening.

## 2017-12-21 NOTE — PROGRESS NOTES
ANTICOAGULATION FOLLOW-UP CLINIC VISIT    Patient Name:  Noe Florence  Date:  12/21/2017  Contact Type:  Telephone    SUBJECTIVE:        OBJECTIVE    INR   Date Value Ref Range Status   12/21/2017 2.57 (H) 0.86 - 1.14 Final       ASSESSMENT / PLAN  INR assessment THER    Recheck INR In: 3 WEEKS    INR Location Clinic      Anticoagulation Summary as of 12/21/2017     INR goal 2.0-3.0   Today's INR 2.57   Maintenance plan 5 mg (5 mg x 1) every day   Full instructions 5 mg every day   Weekly total 35 mg   Plan last modified Nguyen Zuniga RN (12/21/2017)   Next INR check 1/15/2018   Priority INR   Target end date Indefinite    Indications   Atrial fibrillation (H) [I48.91] [I48.91]  Long-term (current) use of anticoagulants [Z79.01] [Z79.01]         Anticoagulation Episode Summary     INR check location     Preferred lab     Send INR reminders to [facility]    Comments Patient contact number: [phone]   Can leave results with Rica (friend)      Anticoagulation Care Providers     Provider Role Specialty Phone number    Deedee Baird MD Responsible Cardiology [phone]            See the Encounter Report to view Anticoagulation Flowsheet and Dosing Calendar (Go to Encounters tab in chart review, and find the Anticoagulation Therapy Visit)    Left message for patient with results and dosing recommendations. Asked patient to call back to report any missed doses, falls, signs and symptoms of bleeding or clotting, any changes in health, medication, or diet. Asked patient to call back with any questions or concerns.     Nguyen Zuniga RN

## 2017-12-21 NOTE — NURSING NOTE
Chief Complaint   Patient presents with     Follow Up For     Return per patient     Vitals were taken and medications were reconciled.    Rachel Azevedo CMA     8:44 AM

## 2017-12-21 NOTE — LETTER
12/21/2017      RE: Noe Florence  [address] [facility] [address]       Dear Colleague,    Thank you for the opportunity to participate in the care of your patient, Noe Florence, at the [facility]. Please see a copy of my visit note below.    CARDIOLOGY CONSULTATION    ASSESSMENT AND PLAN:  We had the pleasure of meeting Noe Florence today, as you know he is a 82 year old male with DM2, HTN, AFib, hx VT and CAD s/p CABG and PCI being seen today for yearly follow up regarding his ischemic heart disease. Patient has been doing well with minimal symptoms. Has establish care with Dr. Desai for ongoing cardialogy care.     No changes in medications or additional testing at this time.  LDL- 56, HDL 52    Follow up with Dr. Lloyd SENIOR.     Seen with Dr. Kenyon.     Srikanth Kirk MD  Cardiology Fellow    Patient examined, chart reviewed and the above reflects our joint assessment and plans.    Tiffany Kenyon MD    HPI:Noe Florence is a 81 year old male with past medical history of CAD s/p CABG 1 vessel in 1996 to PDA, followed by CABG x 4 vessels in 2006 (LIMA-LAD, SVG-dPDA, SVG-OM2, SVG-D1). He  had PCI with JEREMY to the native proximal and distal LCx, HTN, HLD, +FHx and PCI to LCx w/JEREMY on 4/19/2012.      Doing well. No chest pains, dyspnea, palpitations or syncope. Tolerating medications with no difficulties.      PAST MEDICAL HISTORY:  Past Medical History:   Diagnosis Date     Advanced open-angle glaucoma      Anemia     post-op colectomy     Antiplatelet or antithrombotic long-term use      Arrhythmia      Arthritis     hands     Atrial fibrillation (H)      CKD (chronic kidney disease) stage 3, GFR 30-59 ml/min      CKD (chronic kidney disease), stage III 2005     Colon cancer (H)     Stage II-B colon cancer     Coronary artery disease     s/p CABG x 2, JEREMY x 2     Diverticulitis      History of blood transfusion      Hyperlipidemia       Hypertension      Ischemic cardiomyopathy      MGUS (monoclonal gammopathy of unknown significance)      Nonsenile cataract     BE     Pacemaker      Polymorphic ventricular tachycardia (H)      Polymyalgia rheumatica (H)      PVD (posterior vitreous detachment), both eyes 2005     S/P ablation of atrial flutter 6/20/14    CTI     Stented coronary artery      SVT (supraventricular tachycardia) (H)     PPM/AICD for NSVT     Thrombosis of leg     Left leg     Upper leg DVT (deep venous thromboembolism), chronic (H)     Left     Weight loss, unintentional 2017    15 lb in 4 months     CURRENT MEDICATIONS:  Current Outpatient Prescriptions   Medication Sig Dispense Refill     bacitracin ointment Apply topically 2 times daily APPLY TO LEFT LEG TWO TIMES PER DAY 28 g 0     warfarin (COUMADIN) 5 MG tablet 7.5 mg on Wed; 5 mg all other days  or as instructed by coumadin clinic. 35 tablet 5     cyanocobalamin (VITAMIN  B-12) 1000 MCG tablet Take 2 tablets (2,000 mcg) by mouth daily 180 tablet 3     doxazosin (CARDURA) 2 MG tablet Take 1/2 tab in the morning and 1/2 tab in the evening 90 tablet 3     atorvastatin (LIPITOR) 40 MG tablet Take 1 tablet (40 mg) by mouth daily 90 tablet 3     metoprolol (LOPRESSOR) 25 MG tablet Take 1 tablet (25 mg) by mouth 2 times daily 180 tablet 3     ciclopirox (LOPROX) 0.77 % cream Apply topically 2 times daily To feet and toenails. 90 g 6     triamcinolone (KENALOG) 0.1 % cream Apply topically 2 times daily For up to two weeks in a row 80 g 3     loratadine (CLARITIN) 10 MG tablet Take 10 mg by mouth as needed        Multiple Vitamins-Minerals (CENTRUM SILVER) per tablet Take 1 tablet by mouth daily. AM        aspirin 81 MG tablet Take 1 tablet by mouth daily.       mirtazapine (REMERON) 15 MG tablet Take 15 mg by mouth At Bedtime.       sertraline (ZOLOFT) 100 MG tablet Take 100 mg by mouth every evening.       order for DME 20-30 mm Hg knee length compression stockings.  Measure and fit.   Style and color per patient preference.  Doff n Aracelis per patient need. (Patient not taking: Reported on 12/21/2017) 1 Units 0     Blood Pressure Monitor KIT 1 Units daily (Patient not taking: Reported on 12/21/2017) 1 kit 0     ketoconazole (NIZORAL) 2 % cream Apply topically daily On hold (Patient not taking: Reported on 12/21/2017) 60 g 3     fluticasone (FLONASE) 50 MCG/ACT nasal spray Spray 2 sprays into both nostrils daily (Patient not taking: Reported on 12/21/2017) 3 Package 0     PAST SURGICAL HISTORY:  Past Surgical History:   Procedure Laterality Date     C CABG, ARTERY-VEIN, FOUR  2006    LIMA-LAD, SVG-Rt PDA, SVG-OM2, SVG-Diag 1     C CABG, VEIN, SINGLE  1994    1-vessel CABG, SVG->PDRCA      CATARACT IOL, RT/LT Bilateral      COLONOSCOPY  3/13/2014    Procedure: COMBINED COLONOSCOPY, SINGLE BIOPSY/POLYPECTOMY BY BIOPSY;;  Surgeon: Mary Gerber MD;  [facility]     COLONOSCOPY N/A 6/22/2015    Procedure: COLONOSCOPY;  Surgeon: Marilin Newman MD;  Location: [facility]     H ABLATION ATRIAL FLUTTER       HEART CATH DRUG ELUTING STENT PLACEMENT  4/19/2012    JEREMY x 2 to LCx     IMPLANT IMPLANTABLE CARDIOVERTER DEFIBRILLATOR  5-    ppm/aicd     KNEE SURGERY      right and left knee surgeries     LAPAROSCOPIC ASSISTED COLECTOMY LEFT (DESCENDING)  4/8/2014    Procedure: LAPAROSCOPIC ASSISTED COLECTOMY LEFT (DESCENDING);  Hand assisted Laparoscopic Sigmoid Colectomy , Laparoscopic mobilization of spleenic flexure *Latex Allergy*Anesthesia General with Block;  Surgeon: Chanelle Guzmán MD;  Location: [facility]     REPAIR VALVE MITRAL  2006     30-mm Medtronic Julian ring      SELECTIVE LASER TRABECULOPLASTY (SLT) OD (RIGHT EYE)  4/10, 1/12,+1/9/13    RE     SELECTIVE LASER TRABECULOPLASTY (SLT) OS (LEFT EYE)  5/2012     SHOULDER SURGERY      right rotator cuff     ALLERGIES  Latex    FAMILY HX:  Family History   Problem Relation Age of Onset     C.A.D. Father      Anesthesia  "Reaction No family hx of      Crohn Disease No family hx of      Ulcerative Colitis No family hx of      Cancer - colorectal No family hx of      Macular Degeneration No family hx of      CANCER No family hx of      No known family hx of skin cancer     SOCIAL HX:  Social History     Social History     Marital status:      Spouse name: N/A     Number of children: N/A     Years of education: N/A     Social History Main Topics     Smoking status: Former Smoker     Types: Cigarettes, Cigars     Quit date: 1/1/1968     Smokeless tobacco: Never Used     Alcohol use 0.0 oz/week     0 Standard drinks or equivalent per week      Comment: 2-3 drinks twice weekly     Drug use: No     Sexual activity: Not on file     Other Topics Concern     Not on file     Social History Narrative     ROS:  Constitutional: No fever, chills, or sweats. No weight gain/loss.   ENT: No visual disturbance, ear ache, epistaxis, sore throat.   Allergies/Immunologic: Negative.   Respiratory: No cough, hemoptysis.   Cardiovascular: As per HPI.   GI: No nausea, vomiting, hematemesis, melena, or hematochezia.   : No urinary frequency, dysuria, or hematuria.   Integument: Negative.   Psychiatric: Negative.   Neuro: Negative.   Endocrinology: Negative.   Musculoskeletal: No myalgia.    VITAL SIGNS:  Ht 1.702 m (5' 7\")  Wt 68.2 kg (150 lb 6.4 oz)  BMI 23.56 kg/m2  Body mass index is 23.56 kg/(m^2).  Wt Readings from Last 2 Encounters:   12/21/17 68.2 kg (150 lb 6.4 oz)   12/05/17 68.9 kg (152 lb)     PHYSICAL EXAM  GEN:in no acute distress.    HEENT: Unremarkable.    Neck: JVP normal.  Carotids +2 bilaterally without bruits.    Lungs: CTA.    Cor: RRR. Normal S1 and S2.  No murmur, rub, or gallop.  PMI in Lf 5th ICS.    Abd: Soft, nontender, nondistended.    Extremities: trace edema, erythema in the lower extremities,   Pulses +2 Radial, ulnar and DP b/l  Neuro: Grossly intact.    LABS  Lab Results   Component Value Date    WBC 6.0 11/05/2017 "     Lab Results   Component Value Date    RBC 3.17 11/05/2017     Lab Results   Component Value Date    HGB 9.6 11/05/2017     Lab Results   Component Value Date    HCT 30.5 11/05/2017     No components found for: MCT  Lab Results   Component Value Date    MCV 96 11/05/2017     Lab Results   Component Value Date     11/05/2017      Recent Labs   Lab Test  11/05/17   0757  11/04/17   1226   NA  144  144   POTASSIUM  4.4  4.5   CHLORIDE  113*  112*   CO2  25  27   ANIONGAP  6  4   GLC  83  116*   BUN  28  35*   CR  1.12  1.24   KEITH  8.2*  8.9     Recent Labs   Lab Test  11/16/16   1815  09/16/15   1238  05/22/14   1455   CHOL  131  136  138   HDL  52  43  45   LDL  56  72  68   TRIG  114  105  123   CHOLHDLRATIO   --   3.2  3.0   NHDL  79   --    --       ECHO:   01/2017  Interpretation Summary  Global and regional left ventricular function is normal with an EF of 55-60%.  Mild to moderate right ventricular dilation is present.  A pacemaker lead is noted in the right ventricle.  Severe biatrial enlargement is present.  Mitral valve annuloplasty ring is present.  The mean gradient across the mitral valve is 4 mmHg.  The inferior vena cava was normal in size with preserved respiratory  variability.  No pericardial effusion is present.  This study was compared with the study from 12/15/2014. There has been no  change.    CARDIAC CATH:    04/2012        Please do not hesitate to contact me if you have any questions/concerns.     Sincerely,     Tfifany Kenyon MD

## 2017-12-21 NOTE — MR AVS SNAPSHOT
Noe Florence   12/21/2017   Anticoagulation Therapy Visit    Description:  82 year old male   Provider:  Nguyen Zuniga, RN   Department:  [facility]           INR as of 12/21/2017     Today's INR 2.57      Anticoagulation Summary as of 12/21/2017     INR goal 2.0-3.0   Today's INR 2.57   Full instructions 5 mg every day   Next INR check 1/15/2018    Indications   Atrial fibrillation (H) [I48.91] [I48.91]  Long-term (current) use of anticoagulants [Z79.01] [Z79.01]         December 2017 Details    Sun Mon Tue Wed Thu Fri Sat          1               2                 3               4               5               6               7               8               9                 10               11               12               13               14               15               16                 17               18               19               20               21      5 mg   See details      22      5 mg         23      5 mg           24      5 mg         25      5 mg         26      5 mg         27      5 mg         28      5 mg         29      5 mg         30      5 mg           31      5 mg                Date Details   12/21 This INR check               How to take your warfarin dose     To take:  5 mg Take 1 of the 5 mg tablets.           January 2018 Details    Sun Mon Tue Wed Thu Fri Sat      1      5 mg         2      5 mg         3      5 mg         4      5 mg         5      5 mg         6      5 mg           7      5 mg         8      5 mg         9      5 mg         10      5 mg         11      5 mg         12      5 mg         13      5 mg           14      5 mg         15            16               17               18               19               20                 21               22               23               24               25               26               27                 28               29               30               31                   Date Details   No  additional details    Date of next INR:  1/15/2018         How to take your warfarin dose     To take:  5 mg Take 1 of the 5 mg tablets.

## 2017-12-21 NOTE — PROGRESS NOTES
CARDIOLOGY CONSULTATION      ASSESSMENT AND PLAN:  We had the pleasure of meeting Noe Florence today, as you know he is a 82 year old male with DM2, HTN, AFib, hx VT and CAD s/p CABG and PCI being seen today for yearly follow up regarding his ischemic heart disease. Patient has been doing well with minimal symptoms. Has establish care with Dr. Desai for ongoing cardialogy care.     No changes in medications or additional testing at this time.  LDL- 56, HDL 52    Follow up with Dr. Lloyd SENIOR.     Seen with Dr. Kenyon.     Srikanth Kirk MD  Cardiology Fellow    Patient examined, chart reviewed and the above reflects our joint assessment and plans.      Tiffany Kenyon MD      HPI:Noe Florence is a 81 year old male with past medical history of CAD s/p CABG 1 vessel in 1996 to PDA, followed by CABG x 4 vessels in 2006 (LIMA-LAD, SVG-dPDA, SVG-OM2, SVG-D1). He  had PCI with JEREMY to the native proximal and distal LCx, HTN, HLD, +FHx and PCI to LCx w/JEREMY on 4/19/2012.      Doing well. No chest pains, dyspnea, palpitations or syncope. Tolerating medications with no difficulties.      PAST MEDICAL HISTORY:  Past Medical History:   Diagnosis Date     Advanced open-angle glaucoma      Anemia     post-op colectomy     Antiplatelet or antithrombotic long-term use      Arrhythmia      Arthritis     hands     Atrial fibrillation (H)      CKD (chronic kidney disease) stage 3, GFR 30-59 ml/min      CKD (chronic kidney disease), stage III 2005     Colon cancer (H)     Stage II-B colon cancer     Coronary artery disease     s/p CABG x 2, JEREMY x 2     Diverticulitis      History of blood transfusion      Hyperlipidemia      Hypertension      Ischemic cardiomyopathy      MGUS (monoclonal gammopathy of unknown significance)      Nonsenile cataract     BE     Pacemaker      Polymorphic ventricular tachycardia (H)      Polymyalgia rheumatica (H)      PVD (posterior vitreous detachment), both eyes 2005     S/P ablation of atrial  flutter 6/20/14    CTI     Stented coronary artery      SVT (supraventricular tachycardia) (H)     PPM/AICD for NSVT     Thrombosis of leg     Left leg     Upper leg DVT (deep venous thromboembolism), chronic (H)     Left     Weight loss, unintentional 2017    15 lb in 4 months       CURRENT MEDICATIONS:  Current Outpatient Prescriptions   Medication Sig Dispense Refill     bacitracin ointment Apply topically 2 times daily APPLY TO LEFT LEG TWO TIMES PER DAY 28 g 0     warfarin (COUMADIN) 5 MG tablet 7.5 mg on Wed; 5 mg all other days  or as instructed by coumadin clinic. 35 tablet 5     cyanocobalamin (VITAMIN  B-12) 1000 MCG tablet Take 2 tablets (2,000 mcg) by mouth daily 180 tablet 3     doxazosin (CARDURA) 2 MG tablet Take 1/2 tab in the morning and 1/2 tab in the evening 90 tablet 3     atorvastatin (LIPITOR) 40 MG tablet Take 1 tablet (40 mg) by mouth daily 90 tablet 3     metoprolol (LOPRESSOR) 25 MG tablet Take 1 tablet (25 mg) by mouth 2 times daily 180 tablet 3     ciclopirox (LOPROX) 0.77 % cream Apply topically 2 times daily To feet and toenails. 90 g 6     triamcinolone (KENALOG) 0.1 % cream Apply topically 2 times daily For up to two weeks in a row 80 g 3     loratadine (CLARITIN) 10 MG tablet Take 10 mg by mouth as needed        Multiple Vitamins-Minerals (CENTRUM SILVER) per tablet Take 1 tablet by mouth daily. AM        aspirin 81 MG tablet Take 1 tablet by mouth daily.       mirtazapine (REMERON) 15 MG tablet Take 15 mg by mouth At Bedtime.       sertraline (ZOLOFT) 100 MG tablet Take 100 mg by mouth every evening.       order for DME 20-30 mm Hg knee length compression stockings.  Measure and fit.  Style and color per patient preference.  Pat Hsu per patient need. (Patient not taking: Reported on 12/21/2017) 1 Units 0     Blood Pressure Monitor KIT 1 Units daily (Patient not taking: Reported on 12/21/2017) 1 kit 0     ketoconazole (NIZORAL) 2 % cream Apply topically daily On hold (Patient  not taking: Reported on 12/21/2017) 60 g 3     fluticasone (FLONASE) 50 MCG/ACT nasal spray Spray 2 sprays into both nostrils daily (Patient not taking: Reported on 12/21/2017) 3 Package 0       PAST SURGICAL HISTORY:  Past Surgical History:   Procedure Laterality Date     C CABG, ARTERY-VEIN, FOUR  2006    LIMA-LAD, SVG-Rt PDA, SVG-OM2, SVG-Diag 1     C CABG, VEIN, SINGLE  1994    1-vessel CABG, SVG->PDRCA      CATARACT IOL, RT/LT Bilateral      COLONOSCOPY  3/13/2014    Procedure: COMBINED COLONOSCOPY, SINGLE BIOPSY/POLYPECTOMY BY BIOPSY;;  Surgeon: Mary Gerber MD;  Location: [facility]     COLONOSCOPY N/A 6/22/2015    Procedure: COLONOSCOPY;  Surgeon: Marilin Newman MD;  Location: [facility]     H ABLATION ATRIAL FLUTTER       HEART CATH DRUG ELUTING STENT PLACEMENT  4/19/2012    JEREMY x 2 to LCx     IMPLANT IMPLANTABLE CARDIOVERTER DEFIBRILLATOR  5-    ppm/aicd     KNEE SURGERY      right and left knee surgeries     LAPAROSCOPIC ASSISTED COLECTOMY LEFT (DESCENDING)  4/8/2014    Procedure: LAPAROSCOPIC ASSISTED COLECTOMY LEFT (DESCENDING);  Hand assisted Laparoscopic Sigmoid Colectomy , Laparoscopic mobilization of spleenic flexure *Latex Allergy*Anesthesia General with Block;  Surgeon: Chanelle Guzmán MD;  Location:  OR     REPAIR VALVE MITRAL  2006     30-mm Medtronic Julian ring      SELECTIVE LASER TRABECULOPLASTY (SLT) OD (RIGHT EYE)  4/10, 1/12,+1/9/13    RE     SELECTIVE LASER TRABECULOPLASTY (SLT) OS (LEFT EYE)  5/2012     SHOULDER SURGERY      right rotator cuff       ALLERGIES  Latex    FAMILY HX:  Family History   Problem Relation Age of Onset     C.A.D. Father      Anesthesia Reaction No family hx of      Crohn Disease No family hx of      Ulcerative Colitis No family hx of      Cancer - colorectal No family hx of      Macular Degeneration No family hx of      CANCER No family hx of      No known family hx of skin cancer       SOCIAL HX:  Social History     Social History  "    Marital status:      Spouse name: N/A     Number of children: N/A     Years of education: N/A     Social History Main Topics     Smoking status: Former Smoker     Types: Cigarettes, Cigars     Quit date: 1/1/1968     Smokeless tobacco: Never Used     Alcohol use 0.0 oz/week     0 Standard drinks or equivalent per week      Comment: 2-3 drinks twice weekly     Drug use: No     Sexual activity: Not on file     Other Topics Concern     Not on file     Social History Narrative       ROS:  Constitutional: No fever, chills, or sweats. No weight gain/loss.   ENT: No visual disturbance, ear ache, epistaxis, sore throat.   Allergies/Immunologic: Negative.   Respiratory: No cough, hemoptysis.   Cardiovascular: As per HPI.   GI: No nausea, vomiting, hematemesis, melena, or hematochezia.   : No urinary frequency, dysuria, or hematuria.   Integument: Negative.   Psychiatric: Negative.   Neuro: Negative.   Endocrinology: Negative.   Musculoskeletal: No myalgia.    VITAL SIGNS:  Ht 1.702 m (5' 7\")  Wt 68.2 kg (150 lb 6.4 oz)  BMI 23.56 kg/m2  Body mass index is 23.56 kg/(m^2).  Wt Readings from Last 2 Encounters:   12/21/17 68.2 kg (150 lb 6.4 oz)   12/05/17 68.9 kg (152 lb)       PHYSICAL EXAM  GEN:in no acute distress.    HEENT: Unremarkable.    Neck: JVP normal.  Carotids +2 bilaterally without bruits.    Lungs: CTA.    Cor: RRR. Normal S1 and S2.  No murmur, rub, or gallop.  PMI in Lf 5th ICS.    Abd: Soft, nontender, nondistended.    Extremities: trace edema, erythema in the lower extremities,   Pulses +2 Radial, ulnar and DP b/l  Neuro: Grossly intact.    LABS    Lab Results   Component Value Date    WBC 6.0 11/05/2017     Lab Results   Component Value Date    RBC 3.17 11/05/2017     Lab Results   Component Value Date    HGB 9.6 11/05/2017     Lab Results   Component Value Date    HCT 30.5 11/05/2017     No components found for: MCT  Lab Results   Component Value Date    MCV 96 11/05/2017     Lab Results "   Component Value Date     11/05/2017      Recent Labs   Lab Test  11/05/17   0757  11/04/17   1226   NA  144  144   POTASSIUM  4.4  4.5   CHLORIDE  113*  112*   CO2  25  27   ANIONGAP  6  4   GLC  83  116*   BUN  28  35*   CR  1.12  1.24   KEITH  8.2*  8.9     Recent Labs   Lab Test  11/16/16   1815  09/16/15   1238  05/22/14   1455   CHOL  131  136  138   HDL  52  43  45   LDL  56  72  68   TRIG  114  105  123   CHOLHDLRATIO   --   3.2  3.0   NHDL  79   --    --             ECHO:   01/2017  Interpretation Summary  Global and regional left ventricular function is normal with an EF of 55-60%.  Mild to moderate right ventricular dilation is present.  A pacemaker lead is noted in the right ventricle.  Severe biatrial enlargement is present.  Mitral valve annuloplasty ring is present.  The mean gradient across the mitral valve is 4 mmHg.  The inferior vena cava was normal in size with preserved respiratory  variability.  No pericardial effusion is present.  This study was compared with the study from 12/15/2014. There has been no  change.        CARDIAC CATH:    04/2012

## 2017-12-21 NOTE — TELEPHONE ENCOUNTER
CARDIOLOGY CONSULTATION      ASSESSMENT AND PLAN:  We had the pleasure of meeting Noe Florence today, as you know he is a 82 year old male with DM2, HTN, AFib, hx VT and CAD s/p CABG and PCI being seen today for yearly follow up regarding his ischemic heart disease. Patient has been doing well with minimal symptoms. Has establish care with Dr. Desai for ongoing cardialogy care.     No changes in medications or additional testing at this time.          HPI:Noe Florence is a 81 year old male with past medical history of CAD s/p CABG 1 vessel in 1996 to PDA, followed by CABG x 4 vessels in 2006 (LIMA-LAD, SVG-dPDA, SVG-OM2, SVG-D1). He  had PCI with JEREMY to the native proximal and distal LCx, HTN, HLD, +FHx and PCI to LCx w/JEREMY on 4/19/2012.      Doing well. No chest pains, dyspnea, palpitations or syncope. Tolerating medications with no difficulties.      PAST MEDICAL HISTORY:  Past Medical History:   Diagnosis Date     Advanced open-angle glaucoma      Anemia     post-op colectomy     Antiplatelet or antithrombotic long-term use      Arrhythmia      Arthritis     hands     Atrial fibrillation (H)      CKD (chronic kidney disease) stage 3, GFR 30-59 ml/min      CKD (chronic kidney disease), stage III 2005     Colon cancer (H)     Stage II-B colon cancer     Coronary artery disease     s/p CABG x 2, JEREMY x 2     Diverticulitis      History of blood transfusion      Hyperlipidemia      Hypertension      Ischemic cardiomyopathy      MGUS (monoclonal gammopathy of unknown significance)      Nonsenile cataract     BE     Pacemaker      Polymorphic ventricular tachycardia (H)      Polymyalgia rheumatica (H)      PVD (posterior vitreous detachment), both eyes 2005     S/P ablation of atrial flutter 6/20/14    CTI     Stented coronary artery      SVT (supraventricular tachycardia) (H)     PPM/AICD for NSVT     Thrombosis of leg     Left leg     Upper leg DVT (deep venous thromboembolism), chronic (H)     Left      Weight loss, unintentional 2017    15 lb in 4 months       CURRENT MEDICATIONS:  Current Outpatient Prescriptions   Medication Sig Dispense Refill     order for DME 20-30 mm Hg knee length compression stockings.  Measure and fit.  Style and color per patient preference.  Pat Hsu per patient need. 1 Units 0     bacitracin ointment Apply topically 2 times daily APPLY TO LEFT LEG TWO TIMES PER DAY 28 g 0     warfarin (COUMADIN) 5 MG tablet 7.5 mg on Wed; 5 mg all other days  or as instructed by coumadin clinic. 35 tablet 5     Blood Pressure Monitor KIT 1 Units daily 1 kit 0     cyanocobalamin (VITAMIN  B-12) 1000 MCG tablet Take 2 tablets (2,000 mcg) by mouth daily 180 tablet 3     doxazosin (CARDURA) 2 MG tablet Take 1/2 tab in the morning and 1/2 tab in the evening 90 tablet 3     atorvastatin (LIPITOR) 40 MG tablet Take 1 tablet (40 mg) by mouth daily 90 tablet 3     metoprolol (LOPRESSOR) 25 MG tablet Take 1 tablet (25 mg) by mouth 2 times daily 180 tablet 3     ciclopirox (LOPROX) 0.77 % cream Apply topically 2 times daily To feet and toenails. 90 g 6     triamcinolone (KENALOG) 0.1 % cream Apply topically 2 times daily For up to two weeks in a row 80 g 3     ketoconazole (NIZORAL) 2 % cream Apply topically daily On hold 60 g 3     loratadine (CLARITIN) 10 MG tablet Take 10 mg by mouth as needed        fluticasone (FLONASE) 50 MCG/ACT nasal spray Spray 2 sprays into both nostrils daily 3 Package 0     Multiple Vitamins-Minerals (CENTRUM SILVER) per tablet Take 1 tablet by mouth daily. AM        aspirin 81 MG tablet Take 1 tablet by mouth daily.       mirtazapine (REMERON) 15 MG tablet Take 15 mg by mouth At Bedtime.       sertraline (ZOLOFT) 100 MG tablet Take 100 mg by mouth every evening.         PAST SURGICAL HISTORY:  Past Surgical History:   Procedure Laterality Date     C CABG, ARTERY-VEIN, FOUR  2006    LIMA-LAD, SVG-Rt PDA, SVG-OM2, SVG-Diag 1     C CABG, VEIN, SINGLE  1994    1-vessel CABG,  SVG->PDRCA      CATARACT IOL, RT/LT Bilateral      COLONOSCOPY  3/13/2014    Procedure: COMBINED COLONOSCOPY, SINGLE BIOPSY/POLYPECTOMY BY BIOPSY;;  Surgeon: Mary Gerber MD;  Location: [facility]     COLONOSCOPY N/A 6/22/2015    Procedure: COLONOSCOPY;  Surgeon: Marilin Newman MD;  Location: [facility]     H ABLATION ATRIAL FLUTTER       HEART CATH DRUG ELUTING STENT PLACEMENT  4/19/2012    JEREMY x 2 to LCx     IMPLANT IMPLANTABLE CARDIOVERTER DEFIBRILLATOR  5-    ppm/aicd     KNEE SURGERY      right and left knee surgeries     LAPAROSCOPIC ASSISTED COLECTOMY LEFT (DESCENDING)  4/8/2014    Procedure: LAPAROSCOPIC ASSISTED COLECTOMY LEFT (DESCENDING);  Hand assisted Laparoscopic Sigmoid Colectomy , Laparoscopic mobilization of spleenic flexure *Latex Allergy*Anesthesia General with Block;  Surgeon: Chanelle Guzmán MD;  Location: [facility]     REPAIR VALVE MITRAL  2006     30-mm Medtronic Julian ring      SELECTIVE LASER TRABECULOPLASTY (SLT) OD (RIGHT EYE)  4/10, 1/12,+1/9/13    RE     SELECTIVE LASER TRABECULOPLASTY (SLT) OS (LEFT EYE)  5/2012     SHOULDER SURGERY      right rotator cuff       ALLERGIES  Latex    FAMILY HX:  Family History   Problem Relation Age of Onset     C.A.D. Father      Anesthesia Reaction No family hx of      Crohn Disease No family hx of      Ulcerative Colitis No family hx of      Cancer - colorectal No family hx of      Macular Degeneration No family hx of      CANCER No family hx of      No known family hx of skin cancer       SOCIAL HX:  Social History     Social History     Marital status:      Spouse name: N/A     Number of children: N/A     Years of education: N/A     Social History Main Topics     Smoking status: Former Smoker     Types: Cigarettes, Cigars     Quit date: 1/1/1968     Smokeless tobacco: Never Used     Alcohol use 0.0 oz/week     0 Standard drinks or equivalent per week      Comment: 2-3 drinks twice weekly     Drug use: No     Sexual  activity: Not on file     Other Topics Concern     Not on file     Social History Narrative       ROS:  Constitutional: No fever, chills, or sweats. No weight gain/loss.   ENT: No visual disturbance, ear ache, epistaxis, sore throat.   Allergies/Immunologic: Negative.   Respiratory: No cough, hemoptysis.   Cardiovascular: As per HPI.   GI: No nausea, vomiting, hematemesis, melena, or hematochezia.   : No urinary frequency, dysuria, or hematuria.   Integument: Negative.   Psychiatric: Negative.   Neuro: Negative.   Endocrinology: Negative.   Musculoskeletal: No myalgia.    VITAL SIGNS:  There were no vitals taken for this visit.  There is no height or weight on file to calculate BMI.  Wt Readings from Last 2 Encounters:   12/05/17 68.9 kg (152 lb)   12/04/17 68.5 kg (151 lb 1.6 oz)       PHYSICAL EXAM  GEN:in no acute distress.    HEENT: Unremarkable.  Neck: JVP normal.  Carotids +4/4 bilaterally without bruits.  Lungs: CTA.    Cor: RRR. Normal S1 and S2.  No murmur, rub, or gallop.  PMI in Lf 5th ICS.    Abd: Soft, nontender, nondistended.  N  ABS.  No pulsatile mass.    Extremities: No C/C/E.  Pulses +4/4 symmetric in upper and lower extremities.    Neuro: Grossly intact.    LABS    Lab Results   Component Value Date    WBC 6.0 11/05/2017     Lab Results   Component Value Date    RBC 3.17 11/05/2017     Lab Results   Component Value Date    HGB 9.6 11/05/2017     Lab Results   Component Value Date    HCT 30.5 11/05/2017     No components found for: MCT  Lab Results   Component Value Date    MCV 96 11/05/2017     Lab Results   Component Value Date     11/05/2017      Recent Labs   Lab Test  11/05/17   0757  11/04/17   1226   NA  144  144   POTASSIUM  4.4  4.5   CHLORIDE  113*  112*   CO2  25  27   ANIONGAP  6  4   GLC  83  116*   BUN  28  35*   CR  1.12  1.24   KEITH  8.2*  8.9     Recent Labs   Lab Test  11/16/16   1815  09/16/15   1238  05/22/14   1455   CHOL  131  136  138   HDL  52  43  45   LDL  56  72   68   TRIG  114  105  123   CHOLHDLRATIO   --   3.2  3.0   [facility]  79   --    --             ECHO:   01/2017  Interpretation Summary  Global and regional left ventricular function is normal with an EF of 55-60%.  Mild to moderate right ventricular dilation is present.  A pacemaker lead is noted in the right ventricle.  Severe biatrial enlargement is present.  Mitral valve annuloplasty ring is present.  The mean gradient across the mitral valve is 4 mmHg.  The inferior vena cava was normal in size with preserved respiratory  variability.  No pericardial effusion is present.  This study was compared with the study from 12/15/2014. There has been no  change.        CARDIAC CATH:    04/2012

## 2017-12-22 RX ORDER — ATORVASTATIN CALCIUM 40 MG/1
40 TABLET, FILM COATED ORAL DAILY
Qty: 90 TABLET | Refills: 0 | Status: SHIPPED | OUTPATIENT
Start: 2017-12-22 | End: 2018-01-15

## 2017-12-22 NOTE — TELEPHONE ENCOUNTER
atorvastatin (LIPITOR  Last Written Prescription Date:  11/16/16  Last Fill Quantity: 90,   # refills: 3  Last Office Visit : 11/13/17  Future Office visit:  1/15/18

## 2018-01-15 ENCOUNTER — OFFICE VISIT (OUTPATIENT)
Dept: INTERNAL MEDICINE | Facility: CLINIC | Age: 83
End: 2018-01-15
Payer: COMMERCIAL

## 2018-01-15 ENCOUNTER — ANTICOAGULATION THERAPY VISIT (OUTPATIENT)
Dept: ANTICOAGULATION | Facility: CLINIC | Age: 83
End: 2018-01-15

## 2018-01-15 VITALS
SYSTOLIC BLOOD PRESSURE: 123 MMHG | WEIGHT: 149.5 LBS | HEART RATE: 79 BPM | DIASTOLIC BLOOD PRESSURE: 67 MMHG | BODY MASS INDEX: 23.42 KG/M2 | RESPIRATION RATE: 16 BRPM

## 2018-01-15 DIAGNOSIS — R53.81 PHYSICAL DECONDITIONING: ICD-10-CM

## 2018-01-15 DIAGNOSIS — I48.0 PAROXYSMAL ATRIAL FIBRILLATION (H): ICD-10-CM

## 2018-01-15 DIAGNOSIS — H61.21 IMPACTED CERUMEN OF RIGHT EAR: ICD-10-CM

## 2018-01-15 DIAGNOSIS — Z79.01 LONG-TERM (CURRENT) USE OF ANTICOAGULANTS: ICD-10-CM

## 2018-01-15 DIAGNOSIS — R26.89 BALANCE PROBLEMS: ICD-10-CM

## 2018-01-15 DIAGNOSIS — H93.13 TINNITUS, BILATERAL: ICD-10-CM

## 2018-01-15 DIAGNOSIS — I10 HYPERTENSION, UNSPECIFIED TYPE: ICD-10-CM

## 2018-01-15 DIAGNOSIS — I25.10 CORONARY ARTERY DISEASE INVOLVING NATIVE CORONARY ARTERY, ANGINA PRESENCE UNSPECIFIED, UNSPECIFIED WHETHER NATIVE OR TRANSPLANTED HEART: ICD-10-CM

## 2018-01-15 DIAGNOSIS — D64.9 ANEMIA, UNSPECIFIED TYPE: ICD-10-CM

## 2018-01-15 DIAGNOSIS — E78.5 HYPERLIPIDEMIA, UNSPECIFIED HYPERLIPIDEMIA TYPE: ICD-10-CM

## 2018-01-15 DIAGNOSIS — I10 ESSENTIAL HYPERTENSION: ICD-10-CM

## 2018-01-15 DIAGNOSIS — R70.0 ELEVATED SED RATE: ICD-10-CM

## 2018-01-15 DIAGNOSIS — H90.3 SENSORINEURAL HEARING LOSS (SNHL) OF BOTH EARS: ICD-10-CM

## 2018-01-15 DIAGNOSIS — I25.10 CORONARY ARTERY DISEASE INVOLVING NATIVE HEART WITHOUT ANGINA PECTORIS, UNSPECIFIED VESSEL OR LESION TYPE: ICD-10-CM

## 2018-01-15 DIAGNOSIS — M21.372 LEFT FOOT DROP: Primary | ICD-10-CM

## 2018-01-15 LAB
BASOPHILS # BLD AUTO: 0 10E9/L (ref 0–0.2)
BASOPHILS NFR BLD AUTO: 0.5 %
CHOLEST SERPL-MCNC: 119 MG/DL
CRP SERPL-MCNC: <2.9 MG/L (ref 0–8)
DIFFERENTIAL METHOD BLD: ABNORMAL
EOSINOPHIL # BLD AUTO: 0.2 10E9/L (ref 0–0.7)
EOSINOPHIL NFR BLD AUTO: 3.8 %
ERYTHROCYTE [DISTWIDTH] IN BLOOD BY AUTOMATED COUNT: 13.7 % (ref 10–15)
ERYTHROCYTE [SEDIMENTATION RATE] IN BLOOD BY WESTERGREN METHOD: 43 MM/H (ref 0–20)
FOLATE SERPL-MCNC: 29.7 NG/ML
HCT VFR BLD AUTO: 33.9 % (ref 40–53)
HDLC SERPL-MCNC: 58 MG/DL
HGB BLD-MCNC: 10.7 G/DL (ref 13.3–17.7)
IMM GRANULOCYTES # BLD: 0 10E9/L (ref 0–0.4)
IMM GRANULOCYTES NFR BLD: 0.2 %
INR PPP: 2.74 (ref 0.86–1.14)
LDLC SERPL CALC-MCNC: 46 MG/DL
LYMPHOCYTES # BLD AUTO: 0.8 10E9/L (ref 0.8–5.3)
LYMPHOCYTES NFR BLD AUTO: 13.3 %
MCH RBC QN AUTO: 30.7 PG (ref 26.5–33)
MCHC RBC AUTO-ENTMCNC: 31.6 G/DL (ref 31.5–36.5)
MCV RBC AUTO: 97 FL (ref 78–100)
MONOCYTES # BLD AUTO: 0.7 10E9/L (ref 0–1.3)
MONOCYTES NFR BLD AUTO: 11.1 %
NEUTROPHILS # BLD AUTO: 4.2 10E9/L (ref 1.6–8.3)
NEUTROPHILS NFR BLD AUTO: 71.1 %
NONHDLC SERPL-MCNC: 61 MG/DL
NRBC # BLD AUTO: 0 10*3/UL
NRBC BLD AUTO-RTO: 0 /100
PLATELET # BLD AUTO: 199 10E9/L (ref 150–450)
RBC # BLD AUTO: 3.49 10E12/L (ref 4.4–5.9)
RETICS # AUTO: 24.4 10E9/L (ref 25–95)
RETICS/RBC NFR AUTO: 0.7 % (ref 0.5–2)
TRIGL SERPL-MCNC: 76 MG/DL
VIT B12 SERPL-MCNC: 2106 PG/ML (ref 193–986)
WBC # BLD AUTO: 5.9 10E9/L (ref 4–11)

## 2018-01-15 RX ORDER — ATORVASTATIN CALCIUM 40 MG/1
40 TABLET, FILM COATED ORAL DAILY
Qty: 100 TABLET | Refills: 3 | Status: SHIPPED | OUTPATIENT
Start: 2018-01-15 | End: 2019-01-19

## 2018-01-15 RX ORDER — METOPROLOL TARTRATE 25 MG/1
25 TABLET, FILM COATED ORAL 2 TIMES DAILY
Qty: 180 TABLET | Refills: 3 | Status: SHIPPED | OUTPATIENT
Start: 2018-01-15 | End: 2019-04-08

## 2018-01-15 RX ORDER — DOXAZOSIN 2 MG/1
TABLET ORAL
Qty: 90 TABLET | Refills: 3 | Status: SHIPPED | OUTPATIENT
Start: 2018-01-15 | End: 2019-03-05

## 2018-01-15 ASSESSMENT — ACTIVITIES OF DAILY LIVING (ADL)
IN_THE_PAST_7_DAYS,_DID_YOU_NEED_HELP_FROM_OTHERS_TO_TAKE_CARE_OF_THINGS_SUCH_AS_LAUNDRY_AND_HOUSEKEEPING,_BANKING,_SHOPPING,_USING_THE_TELEPHONE,_FOOD_PREPARATION,_TRANSPORTATION,_OR_TAKING_YOUR_OWN_MEDICATIONS?: N
IN_THE_PAST_7_DAYS,_DID_YOU_NEED_HELP_FROM_OTHERS_TO_PERFORM_EVERYDAY_ACTIVITIES_SUCH_AS_EATING,_GETTING_DRESSED,_GROOMING,_BATHING,_WALKING,_OR_USING_THE_TOILET: N

## 2018-01-15 ASSESSMENT — ENCOUNTER SYMPTOMS
NAIL CHANGES: 0
DECREASED CONCENTRATION: 0
SINUS PAIN: 0
ARTHRALGIAS: 1
TASTE DISTURBANCE: 0
PARALYSIS: 0
SINUS CONGESTION: 0
EXERCISE INTOLERANCE: 0
HEADACHES: 0
SKIN CHANGES: 0
DIZZINESS: 1
DEPRESSION: 1
POOR WOUND HEALING: 0
NECK PAIN: 0
DISTURBANCES IN COORDINATION: 0
LOSS OF CONSCIOUSNESS: 0
WEAKNESS: 1
HYPOTENSION: 0
MYALGIAS: 0
SEIZURES: 0
STIFFNESS: 1
LIGHT-HEADEDNESS: 0
MEMORY LOSS: 0
TINGLING: 0
HYPERTENSION: 0
BACK PAIN: 0
INSOMNIA: 0
TREMORS: 0
HOARSE VOICE: 0
SPEECH CHANGE: 0
TROUBLE SWALLOWING: 0
PANIC: 0
ORTHOPNEA: 0
MUSCLE CRAMPS: 0
PALPITATIONS: 0
SMELL DISTURBANCE: 0
NECK MASS: 0
JOINT SWELLING: 1
NERVOUS/ANXIOUS: 1
SYNCOPE: 0
LEG PAIN: 1
MUSCLE WEAKNESS: 1
BRUISES/BLEEDS EASILY: 1
SORE THROAT: 0
NUMBNESS: 0
SLEEP DISTURBANCES DUE TO BREATHING: 0
SWOLLEN GLANDS: 0

## 2018-01-15 ASSESSMENT — PAIN SCALES - GENERAL: PAINLEVEL: NO PAIN (0)

## 2018-01-15 NOTE — PATIENT INSTRUCTIONS
[facility]: [phone]     [facility] Medication Refill Request Information:  * Please contact your pharmacy regarding ANY request for medication refills.  ** [facility] Prescription Fax = [phone]  * Please allow 3 business days for routine medication refills.  * Please allow 5 business days for controlled substance medication refills.     [facility] Test Result notification information:  *You will be notified with in 7-10 days of your appointment day regarding the results of your test.  If you are on MyChart you will be notified as soon as the provider has reviewed the results and signed off on them.    Audiology [phone] [place] [place]   Orthotics/Prosthetics [phone] ([address], [address])   [facility] [phone] ([facility] [place]) Follow up in 6 months.     Please go to the lab on the first floor before you leave today.

## 2018-01-15 NOTE — MR AVS SNAPSHOT
Noe Florence   1/15/2018   Anticoagulation Therapy Visit    Description:  82 year old male   Provider:  Danya Edwards, RN   Department:  [facility]           INR as of 1/15/2018     Today's INR 2.74      Anticoagulation Summary as of 1/15/2018     INR goal 2.0-3.0   Today's INR 2.74   Full instructions 5 mg every day   Next INR check 2/5/2018    Indications   Atrial fibrillation (H) [I48.91] [I48.91]  Long-term (current) use of anticoagulants [Z79.01] [Z79.01]         January 2018 Details    Sun Mon Tue Wed Thu Fri Sat      1               2               3               4               5               6                 7               8               9               10               11               12               13                 14               15      5 mg   See details      16      5 mg         17      5 mg         18      5 mg         19      5 mg         20      5 mg           21      5 mg         22      5 mg         23      5 mg         24      5 mg         25      5 mg         26      5 mg         27      5 mg           28      5 mg         29      5 mg         30      5 mg         31      5 mg             Date Details   01/15 This INR check               How to take your warfarin dose     To take:  5 mg Take 1 of the 5 mg tablets.           February 2018 Details    Sun Mon Tue Wed Thu Fri Sat         1      5 mg         2      5 mg         3      5 mg           4      5 mg         5            6               7               8               9               10                 11               12               13               14               15               16               17                 18               19               20               21               22               23               24                 25               26               27               28                   Date Details   No additional details    Date of next INR:  2/5/2018         How to take your  warfarin dose     To take:  5 mg Take 1 of the 5 mg tablets.

## 2018-01-15 NOTE — PROGRESS NOTES
ANTICOAGULATION FOLLOW-UP CLINIC VISIT    Patient Name:  Noe Florence  Date:  1/15/2018  Contact Type:  Telephone    SUBJECTIVE:        OBJECTIVE    INR   Date Value Ref Range Status   01/15/2018 2.74 (H) 0.86 - 1.14 Final       ASSESSMENT / PLAN  INR assessment THER    Recheck INR In: 3 WEEKS    INR Location Clinic      Anticoagulation Summary as of 1/15/2018     INR goal 2.0-3.0   Today's INR 2.74   Maintenance plan 5 mg (5 mg x 1) every day   Full instructions 5 mg every day   Weekly total 35 mg   No change documented Danya Edwards RN   Plan last modified Nguyen Zuniga RN (12/21/2017)   Next INR check 2/5/2018   Priority INR   Target end date Indefinite    Indications   Atrial fibrillation (H) [I48.91] [I48.91]  Long-term (current) use of anticoagulants [Z79.01] [Z79.01]         Anticoagulation Episode Summary     INR check location     Preferred lab     Send INR reminders to [facility]    Comments Patient contact number: [phone]   Can leave results with Rica (friend)      Anticoagulation Care Providers     Provider Role Specialty Phone number    Deedee Baird MD Responsible Cardiology [phone]            See the Encounter Report to view Anticoagulation Flowsheet and Dosing Calendar (Go to Encounters tab in chart review, and find the Anticoagulation Therapy Visit)    Left message for patient with results and dosing recommendations. Asked patient to call back to report any missed doses, falls, signs and symptoms of bleeding or clotting, any changes in health, medication, or diet. Asked patient to call back with any questions or concerns.     Danay Edwards, BYRON

## 2018-01-15 NOTE — NURSING NOTE
Chief Complaint   Patient presents with     Physical     patient states he is here for a physical     JOESPH RO at 10:24 AM on 1/15/2018.

## 2018-01-15 NOTE — PROGRESS NOTES
HPI  82-year-old seen today with his wife for physical examination.  He has a number of concerns.  He has orthotic for his left-sided foot drop irritates his leg for this reason he tends not to wear it.  He has fallen on 4 occasions.  There is been no injury associated with this is recently started ambulating with the use of a cane which provides greater stability especially on stairs.  He has not had any recent physical therapy evaluation or exercise training.  We did review some quadricep exercises today.  He has had ongoing problems with hearing and tinnitus the tinnitus is worse on the right.  His last audiogram was in 2011 showing a steep sensorineural hearing loss bilaterally.  He is interested in having this reevaluated.  He is having ongoing issues with swelling and edema in his legs and is being fitted with custom compression stockings for this.  His weight is down and his diet is marginal with only 2 meals a day with day night reversal.  Otherwise no symptoms or problems    Past and Family hx reviewed and updated    Past Medical History:   Diagnosis Date     Advanced open-angle glaucoma      Antiplatelet or antithrombotic long-term use      Atrial fibrillation (H)      CKD (chronic kidney disease), stage III 2005     Colon cancer (H)     Stage II-B colon cancer     Coronary artery disease     s/p CABG x 2, JEREMY x 2     Diverticulitis      Hyperlipidemia      Hypertension      Ischemic cardiomyopathy      MGUS (monoclonal gammopathy of unknown significance)      Nonsenile cataract     BE     Pacemaker      Polymorphic ventricular tachycardia (H)      Polymyalgia rheumatica (H)      PVD (posterior vitreous detachment), both eyes 2005     S/P ablation of atrial flutter 6/20/14    CTI     Stented coronary artery      SVT (supraventricular tachycardia) (H)     PPM/AICD for NSVT     Upper leg DVT (deep venous thromboembolism), chronic (H)     Left     Weight loss, unintentional 2017    15 lb in 4 months     Past  Surgical History:   Procedure Laterality Date     C CABG, ARTERY-VEIN, FOUR  2006    LIMA-LAD, SVG-Rt PDA, SVG-OM2, SVG-Diag 1     C CABG, VEIN, SINGLE  1994    1-vessel CABG, SVG->PDRCA      CATARACT IOL, RT/LT Bilateral      COLONOSCOPY  3/13/2014    Procedure: COMBINED COLONOSCOPY, SINGLE BIOPSY/POLYPECTOMY BY BIOPSY;;  Surgeon: Mary Gerber MD;  Location: [facility]     COLONOSCOPY N/A 6/22/2015    Procedure: COLONOSCOPY;  Surgeon: Marilin Newman MD;  Location: [facility]     H ABLATION ATRIAL FLUTTER       HEART CATH DRUG ELUTING STENT PLACEMENT  4/19/2012    JEREMY x 2 to LCx     IMPLANT IMPLANTABLE CARDIOVERTER DEFIBRILLATOR  5-    ppm/aicd     KNEE SURGERY      right and left knee surgeries     LAPAROSCOPIC ASSISTED COLECTOMY LEFT (DESCENDING)  4/8/2014    Procedure: LAPAROSCOPIC ASSISTED COLECTOMY LEFT (DESCENDING);  Hand assisted Laparoscopic Sigmoid Colectomy , Laparoscopic mobilization of spleenic flexure *Latex Allergy*Anesthesia General with Block;  Surgeon: Chanelle Guzmán MD;  Location: [facility]     REPAIR VALVE MITRAL  2006     30-mm Medtronic Julian ring      SELECTIVE LASER TRABECULOPLASTY (SLT) OD (RIGHT EYE)  4/10, 1/12,+1/9/13    RE     SELECTIVE LASER TRABECULOPLASTY (SLT) OS (LEFT EYE)  5/2012     SHOULDER SURGERY      right rotator cuff     Family History   Problem Relation Age of Onset     C.A.D. Father      Anesthesia Reaction No family hx of      Crohn Disease No family hx of      Ulcerative Colitis No family hx of      Cancer - colorectal No family hx of      Macular Degeneration No family hx of      CANCER No family hx of      No known family hx of skin cancer     Social History     Social History     Marital status:      Spouse name: N/A     Number of children: N/A     Years of education: N/A     Social History Main Topics     Smoking status: Former Smoker     Types: Cigarettes, Cigars     Quit date: 1/1/1968     Smokeless tobacco: Never Used     Alcohol  use 0.0 oz/week     0 Standard drinks or equivalent per week      Comment: 2-3 drinks twice weekly     Drug use: No     Sexual activity: Not Asked     Other Topics Concern     None     Social History Narrative     Answers for HPI/ROS submitted by the patient on 1/15/2018   General Symptoms: No  Skin Symptoms: Yes  HENT Symptoms: Yes  EYE SYMPTOMS: No  HEART SYMPTOMS: Yes  LUNG SYMPTOMS: No  INTESTINAL SYMPTOMS: No  URINARY SYMPTOMS: No  REPRODUCTIVE SYMPTOMS: Yes  SKELETAL SYMPTOMS: Yes  BLOOD SYMPTOMS: Yes  NERVOUS SYSTEM SYMPTOMS: Yes  MENTAL HEALTH SYMPTOMS: Yes  Changes in hair: No  Changes in moles/birth marks: No  Itching: Yes  Rashes: Yes  Changes in nails: No  Acne: No  Change in facial hair: No  Warts: No  Non-healing sores: No  Scarring: No  Flaking of skin: Yes  Color changes of hands/feet in cold : No  Sun sensitivity: No  Skin thickening: No  Ear pain: No  Ear discharge: No  Hearing loss: No  Tinnitus: Yes  Nosebleeds: No  Congestion: No  Sinus pain: No  Trouble swallowing: No   Voice hoarseness: No  Mouth sores: No  Sore throat: No  Tooth pain: No  Gum tenderness: No  Bleeding gums: No  Change in taste: No  Change in sense of smell: No  Dry mouth: Yes  Hearing aid used: No  Neck lump: No  Chest pain or pressure: No  Fast or irregular heartbeat: No  Pain in legs with walking: Yes  Trouble breathing while lying down: No  Fingers or toes appear blue: No  High blood pressure: No  Low blood pressure: No  Fainting: No  Murmurs: No  Pacemaker: Yes  Varicose veins: No  Edema or swelling: Yes  Wake up at night with shortness of breath: No  Light-headedness: No  Exercise intolerance: No  Back pain: No  Muscle aches: No  Neck pain: No  Swollen joints: Yes  Joint pain: Yes  Bone pain: No  Muscle cramps: No  Muscle weakness: Yes  Joint stiffness: Yes  Bone fracture: No  Anemia: No  Swollen glands: No  Easy bleeding or bruising: Yes  Trouble with coordination: No  Dizziness or trouble with balance: Yes  Fainting or  black-out spells: No  Memory loss: No  Headache: No  Seizures: No  Speech problems: No  Tingling: No  Tremor: No  Weakness: Yes  Difficulty walking: Yes  Paralysis: No  Numbness: No  Scrotal pain or swelling: No  Erectile dysfunction: Yes  Penile discharge: No  Genital ulcers: No  Reduced libido: Yes  Nervous or Anxious: Yes  Depression: Yes  Trouble sleeping: No  Trouble thinking or concentrating: No  Mood changes: Yes  Panic attacks: No    Exam:  /67  Pulse 79  Resp 16  Wt 67.8 kg (149 lb 8 oz)  BMI 23.42 kg/m2  149 lbs 8 oz  Physical Exam   The patient is alert, oriented with a clear sensorium.   Skin shows numerous keratotic lesions especially on back.r.   Head is normocephalic and atraumatic.   Eyes show PERRLA with benign optic fundi.   Ears show normal lt TM with rt sided cerumen.   Mouth shows clear oral mucosa.   Neck shows no nodes, thyromegaly or bruits.   Back is non tender.  Lungs are clear to percussion and auscultation.   Heart shows normal S1 and S2 without murmur or gallop.   Abdomen is soft and nontender without masses or organomegaly.   Genitalia show normal testes. No evidence of inguinal hernia.  Extremities show 1+ tibial edema with stasis dermatitis and no evidence of active synovitis.   Neurologic examination shows cranial nerves II-XII intact. Motor shows 5/5 UE strength. Foot drop on left. Unable to toe walk,  Romberg negative.    ASSESSMENT  Noe was seen today for physical.    Diagnoses and all orders for this visit:    Left foot drop  -     ORTHOTICS REFERRAL  -     PHYSICAL THERAPY REFERRAL    Hypertension, unspecified type    Coronary artery disease involving native coronary artery, angina presence unspecified, unspecified whether native or transplanted heart  -     Lipid Profile; Future    Anemia, unspecified type  -     Vitamin B12; Future  -     Reticulocyte count; Future  -     Folate; Future  -     Methylmalonic acid; Future  -     CBC with platelets differential;  Future    Elevated sed rate  -     Erythrocyte sedimentation rate auto; Future  -     CRP inflammation; Future  -     Protein electrophoresis; Future    Hyperlipidemia, unspecified hyperlipidemia type  -     atorvastatin (LIPITOR) 40 MG tablet; Take 1 tablet (40 mg) by mouth daily    Essential hypertension  -     doxazosin (CARDURA) 2 MG tablet; Take 1/2 tab in the morning and 1/2 tab in the evening    Coronary artery disease involving native heart without angina pectoris, unspecified vessel or lesion type  -     metoprolol tartrate (LOPRESSOR) 25 MG tablet; Take 1 tablet (25 mg) by mouth 2 times daily    Balance problems  -     PHYSICAL THERAPY REFERRAL    Physical deconditioning  -     PHYSICAL THERAPY REFERRAL    Impacted cerumen of right ear  -     REMOVE IMPACTED CERUMEN    Sensorineural hearing loss (SNHL) of both ears  -     AUDIOLOGY ADULT REFERRAL    Tinnitus, bilateral  -     AUDIOLOGY ADULT REFERRAL    Other orders  -     Cancel: Lipid panel reflex to direct LDL Fasting; Future        This note was completed using Dragon voice recognition software.  Although reviewed after completion, some word and grammatical errors may occur.    Roberto Sarmiento MD  General Internal Medicine  [facility]  [phone]

## 2018-01-16 LAB
ALBUMIN SERPL ELPH-MCNC: 3.9 G/DL (ref 3.7–5.1)
ALPHA1 GLOB SERPL ELPH-MCNC: 0.3 G/DL (ref 0.2–0.4)
ALPHA2 GLOB SERPL ELPH-MCNC: 0.9 G/DL (ref 0.5–0.9)
B-GLOBULIN SERPL ELPH-MCNC: 1 G/DL (ref 0.6–1)
GAMMA GLOB SERPL ELPH-MCNC: 1.1 G/DL (ref 0.7–1.6)
M PROTEIN SERPL ELPH-MCNC: 0.1 G/DL
PROT PATTERN SERPL ELPH-IMP: ABNORMAL

## 2018-01-18 LAB — METHYLMALONATE SERPL-SCNC: 0.35 UMOL/L (ref 0–0.4)

## 2018-01-23 DIAGNOSIS — D64.9 ANEMIA, UNSPECIFIED TYPE: Primary | ICD-10-CM

## 2018-02-05 DIAGNOSIS — I48.0 PAROXYSMAL ATRIAL FIBRILLATION (H): ICD-10-CM

## 2018-02-05 DIAGNOSIS — Z79.01 LONG-TERM (CURRENT) USE OF ANTICOAGULANTS: ICD-10-CM

## 2018-02-05 DIAGNOSIS — D64.9 ANEMIA: ICD-10-CM

## 2018-02-05 LAB — INR PPP: 1.66 (ref 0.86–1.14)

## 2018-02-05 RX ORDER — LANOLIN ALCOHOL/MO/W.PET/CERES
2000 CREAM (GRAM) TOPICAL DAILY
Qty: 180 TABLET | Refills: 3 | Status: ON HOLD | OUTPATIENT
Start: 2018-02-05 | End: 2019-03-12

## 2018-02-06 ENCOUNTER — ANTICOAGULATION THERAPY VISIT (OUTPATIENT)
Dept: ANTICOAGULATION | Facility: CLINIC | Age: 83
End: 2018-02-06

## 2018-02-06 DIAGNOSIS — Z79.01 LONG-TERM (CURRENT) USE OF ANTICOAGULANTS: ICD-10-CM

## 2018-02-06 DIAGNOSIS — I48.0 PAROXYSMAL ATRIAL FIBRILLATION (H): ICD-10-CM

## 2018-02-06 NOTE — MR AVS SNAPSHOT
Noe Florence   2/6/2018   Anticoagulation Therapy Visit    Description:  82 year old male   Provider:  Danya Edwards RN   Department:  [facility]           INR as of 2/6/2018     Today's INR 1.66! (2/5/2018)      Anticoagulation Summary as of 2/6/2018     INR goal 2.0-3.0   Today's INR 1.66! (2/5/2018)   Full instructions 5 mg every day   Next INR check 2/19/2018    Indications   Atrial fibrillation (H) [I48.91] [I48.91]  Long-term (current) use of anticoagulants [Z79.01] [Z79.01]         February 2018 Details    Sun Mon Tue Wed Thu Fri Sat         1               2               3                 4               5               6      5 mg   See details      7      5 mg         8      5 mg         9      5 mg         10      5 mg           11      5 mg         12      5 mg         13      5 mg         14      5 mg         15      5 mg         16      5 mg         17      5 mg           18      5 mg         19            20               21               22               23               24                 25               26               27               28                   Date Details   02/06 This INR check       Date of next INR:  2/19/2018         How to take your warfarin dose     To take:  5 mg Take 1 of the 5 mg tablets.

## 2018-02-06 NOTE — PROGRESS NOTES
ANTICOAGULATION FOLLOW-UP CLINIC VISIT    Patient Name:  Noe Florence  Date:  2/6/2018  Contact Type:  Telephone    SUBJECTIVE:     Patient Findings     Positives Missed doses (Rica thinks Sha missed one dose of warfarin this past week)    Comments No changes in health, diet, medications.             OBJECTIVE    INR   Date Value Ref Range Status   02/05/2018 1.66 (H) 0.86 - 1.14 Final       ASSESSMENT / PLAN  INR assessment SUB    Recheck INR In: 2 WEEKS    INR Location Clinic      Anticoagulation Summary as of 2/6/2018     INR goal 2.0-3.0   Today's INR 1.66! (2/5/2018)   Maintenance plan 5 mg (5 mg x 1) every day   Full instructions 5 mg every day   Weekly total 35 mg   Plan last modified Nguyen Zuniga RN (12/21/2017)   Next INR check 2/19/2018   Priority INR   Target end date Indefinite    Indications   Atrial fibrillation (H) [I48.91] [I48.91]  Long-term (current) use of anticoagulants [Z79.01] [Z79.01]         Anticoagulation Episode Summary     INR check location     Preferred lab     Send INR reminders to [facility]    Comments Patient contact number: [phone]   Can leave results with Rica (friend)      Anticoagulation Care Providers     Provider Role Specialty Phone number    Deedee Baird MD Responsible Cardiology [phone]            See the Encounter Report to view Anticoagulation Flowsheet and Dosing Calendar (Go to Encounters tab in chart review, and find the Anticoagulation Therapy Visit)    Spoke with Sha and Rica.  Rica thinks he missed a dose of warfarin this week.  He will take an increased dose today, then go back to maintenance dose of warfarin.    Danya Edwards RN

## 2018-02-12 ENCOUNTER — OFFICE VISIT (OUTPATIENT)
Dept: DERMATOLOGY | Facility: CLINIC | Age: 83
End: 2018-02-12
Payer: COMMERCIAL

## 2018-02-12 VITALS — HEART RATE: 63 BPM | SYSTOLIC BLOOD PRESSURE: 95 MMHG | RESPIRATION RATE: 18 BRPM | DIASTOLIC BLOOD PRESSURE: 51 MMHG

## 2018-02-12 DIAGNOSIS — L82.0 INFLAMED SEBORRHEIC KERATOSIS: ICD-10-CM

## 2018-02-12 DIAGNOSIS — B35.3 TINEA PEDIS OF BOTH FEET: Primary | ICD-10-CM

## 2018-02-12 DIAGNOSIS — B35.1 ONYCHOMYCOSIS: ICD-10-CM

## 2018-02-12 DIAGNOSIS — L57.0 ACTINIC KERATOSIS: ICD-10-CM

## 2018-02-12 DIAGNOSIS — I87.2 VENOUS STASIS DERMATITIS OF BOTH LOWER EXTREMITIES: ICD-10-CM

## 2018-02-12 ASSESSMENT — PAIN SCALES - GENERAL
PAINLEVEL: NO PAIN (0)
PAINLEVEL: MILD PAIN (2)

## 2018-02-12 NOTE — NURSING NOTE
Dermatology Rooming Note    Noe Florence's goals for this visit include:   Chief Complaint   Patient presents with     Skin Check     Noe is here today for a skin check.      Barb Copeland MA

## 2018-02-12 NOTE — MR AVS SNAPSHOT
After Visit Summary   2/12/2018    Noe Florence    MRN: [id number]           Patient Information     Date Of Birth          1935        Visit Information        Provider Department      2/12/2018 3:30 PM Lashawn Jackson MD [facility] D[facility]        Care Instructions    Tinea pedis. Advised careful drying between toes after showering with either a towel.  Apply terbinafine cream to feet and between toes, as well as to the anterior shin BID for 2 weeks or until clear.      Cryotherapy    What is it?    Use of a very cold liquid, such as liquid nitrogen, to freeze and destroy abnormal skin cells that need to be removed    What should I expect?    Tenderness and redness    A small blister that might grow and fill with dark purple blood. There may be crusting.    More than one treatment may be needed if the lesions do not go away.    How do I care for the treated area?    Gently wash the area with your hands when bathing.    Use a thin layer of Vaseline to help with healing. You may use a Band-Aid.     The area should heal within 7-10 days and may leave behind a pink or lighter color.     Do not use an antibiotic or Neosporin ointment.     You may take acetaminophen (Tylenol) for pain.     Call your Doctor if you have:    Severe pain    Signs of infection (warmth, redness, cloudy yellow drainage, and or a bad smell)    Questions or concerns    Who should I call with questions?       [facility]: [phone]       [facility]: [phone]       For urgent needs outside of business hours call the [facility] at [phone]        and ask for the dermatology resident on call          Follow-ups after your visit        Follow-up notes from your care team     Return in about 3 months (around 5/12/2018).      Your next 10 appointments already scheduled     Feb 13, 2018  9:00 AM CST   (Arrive by 8:45 AM)   Hearing  Evaluation with Nick Frazier   [facility] ([facility])    [address] [place] [id number] [phone]           Please see your medical professional for ear cleaning prior to this appointment if you believe wax buildup may be an issue. All patients are required to have a physician's order stating the medical reason for the hearing test. Your doctor can send an electronic order, use their own form or we have provided a form (called Physician's Order for Audiology Services). It states that there is a medical reason for your exam. Without an order you may need to be rescheduled until the order can be obtained.            Feb 16, 2018  9:00 AM CST   (Arrive by 8:45 AM)   Neuro Eval with Rica Bauman PT   [facility] ([facility])    [address] [place] [id number] [phone]            Mar 30, 2018 11:15 AM CDT   LAB with  LAB   [facility] ([facility])    [address] [place] [id number] [phone]           Please do not eat 10-12 hours before your appointment if you are coming in fasting for labs on lipids, cholesterol, or glucose (sugar). This does not apply to pregnant women. Water, hot tea and black coffee (with nothing added) are okay. Do not drink other fluids, diet soda or chew gum.            Mar 30, 2018 11:40 AM CDT   (Arrive by 11:25 AM)   CT CHEST/ABDOMEN/PELVIS W CONTRAST with UCCT2   [facility] CT ([facility])    [address] [place] [id number] [phone]           Please bring any scans or X-rays taken at other hospitals, if similar tests were done. Also bring a list of your medicines, including vitamins, minerals and over-the-counter drugs. It is safest to leave personal items at home.  Be sure to tell your doctor:   If you have any allergies.   If  there s any chance you are pregnant.   If you are breastfeeding.  You may have contrast for this exam. To prepare:   Do not eat or drink for 2 hours before your exam. If you need to take medicine, you may take it with small sips of water. (We may ask you to take liquid medicine as well.)   The day before your exam, drink extra fluids at least six 8-ounce glasses (unless your doctor tells you to restrict your fluids).   You will be given instructions on how to drink the contrast.  Patients over 70 or patients with diabetes or kidney problems:   If you haven t had a blood test (creatinine test) within the last 30 days, the Cardiologist/Radiologist may require you to get this test prior to your exam.  If you have diabetes:   Continue to take your metformin medication on the day of your exam  Please wear loose clothing, such as a sweat suit or jogging clothes. Avoid snaps, zippers and other metal. We may ask you to undress and put on a hospital gown.  If you have any questions, please call the Imaging Department where you will have your exam.            Apr 02, 2018 10:45 AM CDT   (Arrive by 10:30 AM)   Return Visit with Francisco Gilliam MD   [facility] ([facility])    [address]  [address]  [facility] [address] [phone]            May 21, 2018  3:30 PM CDT   (Arrive by 3:15 PM)   Return Visit with Lashawn Jackson MD   [facility] ([facility])    [address]  3rd Floor  [facility] [id number] [phone]            May 29, 2018  4:00 PM CDT   (Arrive by 3:45 PM)   Return Vascular Visit with Frida Desai MD   [facility] ([facility])    [address]  [address]  [facility] [address] [phone]            Jun 04, 2018  9:00 AM CDT   (Arrive by 8:45 AM)   Implanted Defibulator with Uc Cv Device 1   [facility] ([facility])    [address]  [address]  [facility] [id number] [phone]            Aug 06, 2018 10:00 AM CDT   (Arrive by 9:45 AM)   Return Visit with Roberto Sarmiento MD   [facility] ([facility])    [address]  4th Floor  [facility] [id number] [phone]              Who to contact     Please call your clinic at [phone] to:    Ask questions about your health    Make or cancel appointments    Discuss your medicines    Learn about your test results    Speak to your doctor            Additional Information About Your Visit        [employer] Information     [employer] gives you secure access to your electronic health record. If you see a primary care provider, you can also send messages to your care team and make appointments. If you have questions, please call your primary care clinic.  If you do not have a primary care provider, please call [phone] and they will assist you.      [employer] is an electronic gateway that provides easy, online access to your medical records. With [employer], you can request a clinic appointment, read your test results, renew a prescription or communicate with your care team.     To access your existing account, please contact your [facility] or call [phone] for assistance.        Care EveryWhere ID     This is your Care EveryWhere ID. This could be used by other organizations to access your [facility] medical records  [id number]        Your Vitals Were     Pulse Respirations                63 18           Blood Pressure from Last 3 Encounters:   02/12/18 95/51   01/15/18 123/67   12/21/17 105/64    Weight from Last 3 Encounters:   01/15/18 67.8 kg (149 lb 8 oz)   12/21/17 68.2 kg (150 lb 6.4 oz)   12/05/17 68.9 kg (152 lb)              Today, you had the following     No orders found for display       Primary Care Provider Office Phone # Fax #    Roberto Mario  MD [place] [phone]       [address] [place] [address]  [facility] [address]        Equal Access to Services     LEE CASTILLO : Hadii aad ku hadtiffanykishore Bains, wakerryda nehajenha, corinreina rubiopeewee holcomb, sil macariojesse minor. So [facility] [phone].    ATENCIÓN: Si habla español, tiene a aguirre disposición servicios gratuitos de asistencia lingüística. Llame al [phone].    We comply with applicable federal civil rights laws and [place] laws. We do not discriminate on the basis of race, color, national origin, age, disability, sex, sexual orientation, or gender identity.            Thank you!     Thank you for choosing [facility]  for your care. Our goal is always to provide you with excellent care. Hearing back from our patients is one way we can continue to improve our services. Please take a few minutes to complete the written survey that you may receive in the mail after your visit with us. Thank you!             Your Updated Medication List - Protect others around you: Learn how to safely use, store and throw away your medicines at www.disposemymeds.org.          This list is accurate as of 2/12/18  4:47 PM.  Always use your most recent med list.                   Brand Name Dispense Instructions for use Diagnosis    aspirin 81 MG tablet      Take 1 tablet by mouth daily.        atorvastatin 40 MG tablet    LIPITOR    100 tablet    Take 1 tablet (40 mg) by mouth daily    Hyperlipidemia, unspecified hyperlipidemia type       bacitracin ointment     28 g    Apply topically 2 times daily APPLY TO LEFT LEG TWO TIMES PER DAY    Left leg cellulitis       Blood Pressure Monitor Kit     1 kit    1 Units daily    Hypertension       CENTRUM SILVER per tablet      Take 1 tablet by mouth daily. AM        ciclopirox 0.77 % cream    LOPROX    90 g    Apply topically 2 times daily To feet and toenails.    Dermatophytosis of nail, Tinea pedis of both feet       cyanocobalamin 1000  MCG tablet    vitamin  B-12    180 tablet    Take 2 tablets (2,000 mcg) by mouth daily    Anemia       doxazosin 2 MG tablet    CARDURA    90 tablet    Take 1/2 tab in the morning and 1/2 tab in the evening    Essential hypertension       fluticasone 50 MCG/ACT spray    FLONASE    3 Package    Spray 2 sprays into both nostrils daily    Unspecified sinusitis (chronic)       ketoconazole 2 % cream    NIZORAL    60 g    Apply topically daily On hold    Tinea corporis       loratadine 10 MG tablet    CLARITIN     Take 10 mg by mouth as needed        metoprolol tartrate 25 MG tablet    LOPRESSOR    180 tablet    Take 1 tablet (25 mg) by mouth 2 times daily    Coronary artery disease involving native heart without angina pectoris, unspecified vessel or lesion type       mirtazapine 15 MG tablet    REMERON     Take 15 mg by mouth At Bedtime.        order for DME     1 Units    20-30 mm Hg knee length compression stockings.  Measure and fit.  Style and color per patient preference.  Doff n Aracelis per patient need.    PVD (peripheral vascular disease) (H), Ulcer of left lower extremity, limited to breakdown of skin (H)       triamcinolone 0.1 % cream    KENALOG    80 g    Apply topically 2 times daily For up to two weeks in a row    Atopic eczema       warfarin 5 MG tablet    COUMADIN    35 tablet    7.5 mg on Wed; 5 mg all other days  or as instructed by coumadin clinic.    Atrial flutter (H)       ZOLOFT 100 MG tablet   Generic drug:  sertraline      Take 100 mg by mouth every evening.

## 2018-02-12 NOTE — LETTER
2/12/2018       RE: Noe Florence  [address] [facility] [address]     Dear Colleague,    Thank you for referring your patient, Noe Florence, to the [facility] DERMATOLOGY at [facility]. Please see a copy of my visit note below.    [facility] Dermatology Note      Dermatology Problem List:  1.  Seborrheic Keratoses  2.  Stasis Dermatitis  3.  Tinea Pedis, bilateral  4.  Tinea corporis, 8/2013, 9/2015  5.  Tinea cruris, 8/2013    Encounter Date: Feb 12, 2018    CC:  Chief Complaint   Patient presents with     Skin Check     Noe is here today for a skin check.      History of Present Illness:  Mr. Noe Florence is a 82 year old male who presents as a follow-up for skin check. The patient was last seen 9/17/2015 when he was treated for tinea corporis and atopic eczema.  He has a history of innumerable seborrheic keratoses, of which he notes that a few have been removed over the years.  He also developed a leg wound in Summer of 2017; was seen by vascular and wound clinic with the wound now resolved on the anterior left leg.  Persistent venous stasis has led to stasis dermatitis of the bilateral lower legs.  The patient does not currently have compression stockings and wears ankle socks.  He was treated for Tinea in 2013 and 2015.  He does not have any animals or pets at home.    Past Medical History:   Patient Active Problem List   Diagnosis     Rotator cuff (capsule) sprain     Other postprocedural status(V45.89)     Hyperkalemia     Anemia     Diarrhea     Diverticulitis of colon     Hypertension     CAD (coronary artery disease)     NSVT (nonsustained ventricular tachycardia) (H)     NSTEMI (non-ST elevated myocardial infarction) (H)     Automatic implantable cardioverter-defibrillator - Medtronic dual chamber ICD - Not Dependent     Skin exam, screening for cancer     Tinea cruris     Tinea corporis     Colon cancer (H)     Polymyalgia  rheumatica (H)     S/P ablation of atrial flutter     Primary open angle glaucoma     Atrial fibrillation (H) [I48.91]     Long-term (current) use of anticoagulants [Z79.01]     Ischemic cardiomyopathy     CKD (chronic kidney disease), stage III     Weight loss, unintentional     Skin infection     Past Medical History:   Diagnosis Date     Advanced open-angle glaucoma      Antiplatelet or antithrombotic long-term use      Atrial fibrillation (H)      CKD (chronic kidney disease), stage III 2005     Colon cancer (H)     Stage II-B colon cancer     Coronary artery disease     s/p CABG x 2, JEREMY x 2     Diverticulitis      Hyperlipidemia      Hypertension      Ischemic cardiomyopathy      MGUS (monoclonal gammopathy of unknown significance)      Nonsenile cataract     BE     Pacemaker      Polymorphic ventricular tachycardia (H)      Polymyalgia rheumatica (H)      PVD (posterior vitreous detachment), both eyes 2005     S/P ablation of atrial flutter 6/20/14    CTI     Stented coronary artery      SVT (supraventricular tachycardia) (H)     PPM/AICD for NSVT     Upper leg DVT (deep venous thromboembolism), chronic (H)     Left     Weight loss, unintentional 2017    15 lb in 4 months     Past Surgical History:   Procedure Laterality Date     C CABG, ARTERY-VEIN, FOUR  2006    LIMA-LAD, SVG-Rt PDA, SVG-OM2, SVG-Diag 1     C CABG, VEIN, SINGLE  1994    1-vessel CABG, SVG->PDRCA      CATARACT IOL, RT/LT Bilateral      COLONOSCOPY  3/13/2014    Procedure: COMBINED COLONOSCOPY, SINGLE BIOPSY/POLYPECTOMY BY BIOPSY;;  Surgeon: Mary Gerber MD;  Location:  GI     COLONOSCOPY N/A 6/22/2015    Procedure: COLONOSCOPY;  Surgeon: Marilin Newman MD;  Location:  GI     H ABLATION ATRIAL FLUTTER       HEART CATH DRUG ELUTING STENT PLACEMENT  4/19/2012    JEREMY x 2 to LCx     IMPLANT IMPLANTABLE CARDIOVERTER DEFIBRILLATOR  5-    ppm/aicd     KNEE SURGERY      right and left knee surgeries     LAPAROSCOPIC  ASSISTED COLECTOMY LEFT (DESCENDING)  4/8/2014    Procedure: LAPAROSCOPIC ASSISTED COLECTOMY LEFT (DESCENDING);  Hand assisted Laparoscopic Sigmoid Colectomy , Laparoscopic mobilization of spleenic flexure *Latex Allergy*Anesthesia General with Block;  Surgeon: Chanelle Guzmán MD;  Location: [facility] OR     REPAIR VALVE MITRAL  2006     30-mm Medtronic Julian ring      SELECTIVE LASER TRABECULOPLASTY (SLT) OD (RIGHT EYE)  4/10, 1/12,+1/9/13    RE     SELECTIVE LASER TRABECULOPLASTY (SLT) OS (LEFT EYE)  5/2012     SHOULDER SURGERY      right rotator cuff       Social History:  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    Family History:  There is no family history of skin cancer.    Medications:  Current Outpatient Prescriptions   Medication Sig Dispense Refill     cyanocobalamin (VITAMIN  B-12) 1000 MCG tablet Take 2 tablets (2,000 mcg) by mouth daily 180 tablet 3     atorvastatin (LIPITOR) 40 MG tablet Take 1 tablet (40 mg) by mouth daily 100 tablet 3     doxazosin (CARDURA) 2 MG tablet Take 1/2 tab in the morning and 1/2 tab in the evening 90 tablet 3     metoprolol tartrate (LOPRESSOR) 25 MG tablet Take 1 tablet (25 mg) by mouth 2 times daily 180 tablet 3     order for DME 20-30 mm Hg knee length compression stockings.  Measure and fit.  Style and color per patient preference.  Doff n Aracelis per patient need. 1 Units 0     bacitracin ointment Apply topically 2 times daily APPLY TO LEFT LEG TWO TIMES PER DAY 28 g 0     warfarin (COUMADIN) 5 MG tablet 7.5 mg on Wed; 5 mg all other days  or as instructed by coumadin clinic. 35 tablet 5     Blood Pressure Monitor KIT 1 Units daily 1 kit 0     ciclopirox (LOPROX) 0.77 % cream Apply topically 2 times daily To feet and toenails. 90 g 6     triamcinolone (KENALOG) 0.1 % cream Apply topically 2 times daily For up to two weeks in a row 80 g 3      ketoconazole (NIZORAL) 2 % cream Apply topically daily On hold 60 g 3     loratadine (CLARITIN) 10 MG tablet Take 10 mg by mouth as needed        Multiple Vitamins-Minerals (CENTRUM SILVER) per tablet Take 1 tablet by mouth daily. AM        aspirin 81 MG tablet Take 1 tablet by mouth daily.       mirtazapine (REMERON) 15 MG tablet Take 15 mg by mouth At Bedtime.       sertraline (ZOLOFT) 100 MG tablet Take 100 mg by mouth every evening.       fluticasone (FLONASE) 50 MCG/ACT nasal spray Spray 2 sprays into both nostrils daily (Patient not taking: Reported on 2/12/2018) 3 Package 0     Allergies   Allergen Reactions     Latex Rash     Rash         Review of Systems:  -Skin Establ Pt: The patient denies any new rash, pruritus, or lesions that are symptomatic, changing or bleeding, except as per HPI.  -Constitutional: The patient denies fatigue, fevers, chills, and night sweats.  -HEENT: Patient denies nonhealing oral sores.  -Skin: As above in HPI. No additional skin concerns.    Physical exam:  Vitals: BP 95/51 (Cuff Size: Adult Regular)  Pulse 63  Resp 18  GEN: This is a well developed male in no acute distress, in a pleasant mood.    SKIN: Total skin excluding the undergarment areas was performed. The exam included the head/face, neck, both arms, chest, back, abdomen, both legs, digits and/or nails.   -Bilateral purpura of the distal anterior shins with evidence of mild skin breakdown on the left anterior shin; 3+ pedal edema up to the knee bilaterally; distal pulses 2+.  -Has innumerable, diffuse thick leathery brown papules with a stuck on appearance throughout the scalp, face, neck, chest, back, abdomen, upper and lower extremities.  -A approximately 6 cm erythematous macule with small vesicles present on right anterior shin.  -A smaller, annular scaly erythematous plaque present on right medial shin.  -No other lesions of concern on areas examined.     Impression/Plan:  1. Stasis Dermatitis    Given  underlying cardiac history combined with venous insufficiency, chronic venous stasis and subsequent development of stasis dermatitis with pitting edema is present in the bilateral lower legs.  Discussed the importance compression stockings, and elevation of the lower extremities to minimize edema.  Also emphasized the importance of adequate skin hydration and monitoring for signs of skin breakdown.    2. Tinea Pedis of bilateral feet and Tinea Corpus of right anterior and medial shin    Skin scraping and KOH performed of the right sole of foot, which was convincing for fungal elements.    Discussed etiology and natural history of this dermatophyte infection.  Advised careful drying between toes after showering with either a towel or hairdryer.  Apply terbinafine cream to feet and between toes, as well as to the anterior shin BID for 2 weeks or until clear.    3. Seborrheic Keratoses and inflammed seborrheic keratoses    Discussed the benign nature of these lesions  Cryotherapy procedure note (performed with medical student participation): After verbal consent and discussion of risks and benefits including but no limited to dyspigmentation/scar, blister, infection, recurrence, and pain, 5 lesions were treated with 1-2mm freeze border for 2 cycles with liquid nitrogen. Post cryotherapy instructions were provided.    4. Actinic Keratosis of the left temporal scalp    Discussed the nature of lesion without evidence of atypia.  Discussed signs and symptoms of skin cancer, including changes in size, shape, bleeding, nodularity, or tenderness of lesions.    Cryotherapy procedure note : After verbal consent and discussion of risks and benefits including but no limited to dyspigmentation/scar, blister, infection, recurrence, and pain 1 actinic keratosis was treated with 1-2mm freeze border for 2 cycles with liquid nitrogen. Post cryotherapy instructions were provided.     CC Dr. Roberto Sarmiento on close of this  encounter.  Follow-up in 3 months, earlier for new or changing lesions.     Staff Involved:  Scribed by Cheri Chavira, MS4 for Dr. Jackson.      I agree with the PFSH and ROS as completed by the Medical Student. The remainder of the encounter was performed by me and scribed by the Medical Student. The scribed note accurately reflects my personal services and the medical decisions made by me. The cryotherapy procedure was done together.       Lashawn Jackson MD  Professor and Chair  Department of Dermatology  [facility]              Again, thank you for allowing me to participate in the care of your patient.      Sincerely,    Lashawn Jackson MD

## 2018-02-12 NOTE — PROGRESS NOTES
[facility] Dermatology Note      Dermatology Problem List:  1.  Seborrheic Keratoses  2.  Stasis Dermatitis  3.  Tinea Pedis, bilateral  4.  Tinea corporis, 8/2013, 9/2015  5.  Tinea cruris, 8/2013    Encounter Date: Feb 12, 2018    CC:  Chief Complaint   Patient presents with     Skin Check     Noe is here today for a skin check.      History of Present Illness:  Mr. Noe Florence is a 82 year old male who presents as a follow-up for skin check. The patient was last seen 9/17/2015 when he was treated for tinea corporis and atopic eczema.  He has a history of innumerable seborrheic keratoses, of which he notes that a few have been removed over the years.  He also developed a leg wound in Summer of 2017; was seen by vascular and wound clinic with the wound now resolved on the anterior left leg.  Persistent venous stasis has led to stasis dermatitis of the bilateral lower legs.  The patient does not currently have compression stockings and wears ankle socks.  He was treated for Tinea in 2013 and 2015.  He does not have any animals or pets at home.    Past Medical History:   Patient Active Problem List   Diagnosis     Rotator cuff (capsule) sprain     Other postprocedural status(V45.89)     Hyperkalemia     Anemia     Diarrhea     Diverticulitis of colon     Hypertension     CAD (coronary artery disease)     NSVT (nonsustained ventricular tachycardia) (H)     NSTEMI (non-ST elevated myocardial infarction) (H)     Automatic implantable cardioverter-defibrillator - Medtronic dual chamber ICD - Not Dependent     Skin exam, screening for cancer     Tinea cruris     Tinea corporis     Colon cancer (H)     Polymyalgia rheumatica (H)     S/P ablation of atrial flutter     Primary open angle glaucoma     Atrial fibrillation (H) [I48.91]     Long-term (current) use of anticoagulants [Z79.01]     Ischemic cardiomyopathy     CKD (chronic kidney disease), stage III     Weight loss, unintentional     Skin  infection     Past Medical History:   Diagnosis Date     Advanced open-angle glaucoma      Antiplatelet or antithrombotic long-term use      Atrial fibrillation (H)      CKD (chronic kidney disease), stage III 2005     Colon cancer (H)     Stage II-B colon cancer     Coronary artery disease     s/p CABG x 2, JEREMY x 2     Diverticulitis      Hyperlipidemia      Hypertension      Ischemic cardiomyopathy      MGUS (monoclonal gammopathy of unknown significance)      Nonsenile cataract     BE     Pacemaker      Polymorphic ventricular tachycardia (H)      Polymyalgia rheumatica (H)      PVD (posterior vitreous detachment), both eyes 2005     S/P ablation of atrial flutter 6/20/14    CTI     Stented coronary artery      SVT (supraventricular tachycardia) (H)     PPM/AICD for NSVT     Upper leg DVT (deep venous thromboembolism), chronic (H)     Left     Weight loss, unintentional 2017    15 lb in 4 months     Past Surgical History:   Procedure Laterality Date     C CABG, ARTERY-VEIN, FOUR  2006    LIMA-LAD, SVG-Rt PDA, SVG-OM2, SVG-Diag 1     C CABG, VEIN, SINGLE  1994    1-vessel CABG, SVG->PDRCA      CATARACT IOL, RT/LT Bilateral      COLONOSCOPY  3/13/2014    Procedure: COMBINED COLONOSCOPY, SINGLE BIOPSY/POLYPECTOMY BY BIOPSY;;  Surgeon: Mary Gerber MD;  Location:  GI     COLONOSCOPY N/A 6/22/2015    Procedure: COLONOSCOPY;  Surgeon: Marilin Newman MD;  Location: [facility] GI     H ABLATION ATRIAL FLUTTER       HEART CATH DRUG ELUTING STENT PLACEMENT  4/19/2012    JEREMY x 2 to LCx     IMPLANT IMPLANTABLE CARDIOVERTER DEFIBRILLATOR  5-    ppm/aicd     KNEE SURGERY      right and left knee surgeries     LAPAROSCOPIC ASSISTED COLECTOMY LEFT (DESCENDING)  4/8/2014    Procedure: LAPAROSCOPIC ASSISTED COLECTOMY LEFT (DESCENDING);  Hand assisted Laparoscopic Sigmoid Colectomy , Laparoscopic mobilization of spleenic flexure *Latex Allergy*Anesthesia General with Block;  Surgeon: Chanelle Guzmán  MD;  Location: [facility] OR     REPAIR VALVE MITRAL  2006     30-mm Medtronic Julian ring      SELECTIVE LASER TRABECULOPLASTY (SLT) OD (RIGHT EYE)  4/10, 1/12,+1/9/13    RE     SELECTIVE LASER TRABECULOPLASTY (SLT) OS (LEFT EYE)  5/2012     SHOULDER SURGERY      right rotator cuff       Social History:  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    Family History:  There is no family history of skin cancer.    Medications:  Current Outpatient Prescriptions   Medication Sig Dispense Refill     cyanocobalamin (VITAMIN  B-12) 1000 MCG tablet Take 2 tablets (2,000 mcg) by mouth daily 180 tablet 3     atorvastatin (LIPITOR) 40 MG tablet Take 1 tablet (40 mg) by mouth daily 100 tablet 3     doxazosin (CARDURA) 2 MG tablet Take 1/2 tab in the morning and 1/2 tab in the evening 90 tablet 3     metoprolol tartrate (LOPRESSOR) 25 MG tablet Take 1 tablet (25 mg) by mouth 2 times daily 180 tablet 3     order for DME 20-30 mm Hg knee length compression stockings.  Measure and fit.  Style and color per patient preference.  Doff n Aracelis per patient need. 1 Units 0     bacitracin ointment Apply topically 2 times daily APPLY TO LEFT LEG TWO TIMES PER DAY 28 g 0     warfarin (COUMADIN) 5 MG tablet 7.5 mg on Wed; 5 mg all other days  or as instructed by coumadin clinic. 35 tablet 5     Blood Pressure Monitor KIT 1 Units daily 1 kit 0     ciclopirox (LOPROX) 0.77 % cream Apply topically 2 times daily To feet and toenails. 90 g 6     triamcinolone (KENALOG) 0.1 % cream Apply topically 2 times daily For up to two weeks in a row 80 g 3     ketoconazole (NIZORAL) 2 % cream Apply topically daily On hold 60 g 3     loratadine (CLARITIN) 10 MG tablet Take 10 mg by mouth as needed        Multiple Vitamins-Minerals (CENTRUM SILVER) per tablet Take 1 tablet by mouth daily. AM        aspirin 81 MG tablet Take 1 tablet by mouth daily.        mirtazapine (REMERON) 15 MG tablet Take 15 mg by mouth At Bedtime.       sertraline (ZOLOFT) 100 MG tablet Take 100 mg by mouth every evening.       fluticasone (FLONASE) 50 MCG/ACT nasal spray Spray 2 sprays into both nostrils daily (Patient not taking: Reported on 2/12/2018) 3 Package 0     Allergies   Allergen Reactions     Latex Rash     Rash         Review of Systems:  -Skin Establ Pt: The patient denies any new rash, pruritus, or lesions that are symptomatic, changing or bleeding, except as per HPI.  -Constitutional: The patient denies fatigue, fevers, chills, and night sweats.  -HEENT: Patient denies nonhealing oral sores.  -Skin: As above in HPI. No additional skin concerns.    Physical exam:  Vitals: BP 95/51 (Cuff Size: Adult Regular)  Pulse 63  Resp 18  GEN: This is a well developed male in no acute distress, in a pleasant mood.    SKIN: Total skin excluding the undergarment areas was performed. The exam included the head/face, neck, both arms, chest, back, abdomen, both legs, digits and/or nails.   -Bilateral purpura of the distal anterior shins with evidence of mild skin breakdown on the left anterior shin; 3+ pedal edema up to the knee bilaterally; distal pulses 2+.  -Has innumerable, diffuse thick leathery brown papules with a stuck on appearance throughout the scalp, face, neck, chest, back, abdomen, upper and lower extremities.  -A approximately 6 cm erythematous macule with small vesicles present on right anterior shin.  -A smaller, annular scaly erythematous plaque present on right medial shin.  -No other lesions of concern on areas examined.     Impression/Plan:  1. Stasis Dermatitis    Given underlying cardiac history combined with venous insufficiency, chronic venous stasis and subsequent development of stasis dermatitis with pitting edema is present in the bilateral lower legs.  Discussed the importance compression stockings, and elevation of the lower extremities to minimize edema.   Also emphasized the importance of adequate skin hydration and monitoring for signs of skin breakdown.    2. Tinea Pedis of bilateral feet and Tinea Corpus of right anterior and medial shin    Skin scraping and KOH performed of the right sole of foot, which was convincing for fungal elements.    Discussed etiology and natural history of this dermatophyte infection.  Advised careful drying between toes after showering with either a towel or hairdryer.  Apply terbinafine cream to feet and between toes, as well as to the anterior shin BID for 2 weeks or until clear.    3. Seborrheic Keratoses and inflammed seborrheic keratoses    Discussed the benign nature of these lesions  Cryotherapy procedure note (performed with medical student participation): After verbal consent and discussion of risks and benefits including but no limited to dyspigmentation/scar, blister, infection, recurrence, and pain, 5 lesions were treated with 1-2mm freeze border for 2 cycles with liquid nitrogen. Post cryotherapy instructions were provided.    4. Actinic Keratosis of the left temporal scalp    Discussed the nature of lesion without evidence of atypia.  Discussed signs and symptoms of skin cancer, including changes in size, shape, bleeding, nodularity, or tenderness of lesions.    Cryotherapy procedure note : After verbal consent and discussion of risks and benefits including but no limited to dyspigmentation/scar, blister, infection, recurrence, and pain 1 actinic keratosis was treated with 1-2mm freeze border for 2 cycles with liquid nitrogen. Post cryotherapy instructions were provided.     CC Dr. Roberto Sarmiento on close of this encounter.  Follow-up in 3 months, earlier for new or changing lesions.     Staff Involved:  Scribed by Cheri Chavira, MS4 for Dr. Jackson.      I agree with the PFSH and ROS as completed by the Medical Student. The remainder of the encounter was performed by me and scribed by the Medical Student. The scribed  note accurately reflects my personal services and the medical decisions made by me. The cryotherapy procedure was done together.       Lashawn Jackson MD  Professor and Chair  Department of Dermatology  [facility]

## 2018-02-12 NOTE — PATIENT INSTRUCTIONS
Tinea pedis. Advised careful drying between toes after showering with either a towel.  Apply terbinafine cream to feet and between toes, as well as to the anterior shin BID for 2 weeks or until clear.      Cryotherapy    What is it?    Use of a very cold liquid, such as liquid nitrogen, to freeze and destroy abnormal skin cells that need to be removed    What should I expect?    Tenderness and redness    A small blister that might grow and fill with dark purple blood. There may be crusting.    More than one treatment may be needed if the lesions do not go away.    How do I care for the treated area?    Gently wash the area with your hands when bathing.    Use a thin layer of Vaseline to help with healing. You may use a Band-Aid.     The area should heal within 7-10 days and may leave behind a pink or lighter color.     Do not use an antibiotic or Neosporin ointment.     You may take acetaminophen (Tylenol) for pain.     Call your Doctor if you have:    Severe pain    Signs of infection (warmth, redness, cloudy yellow drainage, and or a bad smell)    Questions or concerns    Who should I call with questions?       [facility]: [phone]       [facility]: [phone]       For urgent needs outside of business hours call the [facility] at [phone]        and ask for the dermatology resident on call

## 2018-02-13 ENCOUNTER — OFFICE VISIT (OUTPATIENT)
Dept: AUDIOLOGY | Facility: CLINIC | Age: 83
End: 2018-02-13
Payer: COMMERCIAL

## 2018-02-13 DIAGNOSIS — H90.3 SENSORY HEARING LOSS, BILATERAL: Primary | ICD-10-CM

## 2018-02-13 NOTE — PROGRESS NOTES
AUDIOLOGY REPORT    SUBJECTIVE:  Noe Florence is a 82 year old male who was seen in Audiology at the [facility], [facility] [place]  for audiologic evaluation, referred by Roberto Sarmiento.  The patient has been seen previously in this clinic on 7/27/2011 for hearing assessment assessment and results indicated normal sloping to profound sensorineural hearing loss bilaterally.  The patient reports that the right ear pressure he was seen for at that time has since ceased, but that the episodic tinnitus is still present but non-bothersome. The patient does report concerns for balance as of lately but denies true vertigo. The patient denies a change in hearing or other ear related issues.    OBJECTIVE:  Fall Risk Screen:  1. Have you fallen two or more times in the past year? No  2. Have you fallen and had an injury in the past year? No      Otoscopic exam indicates ears are clear of cerumen bilaterally     Pure Tone Thresholds assessed using conventional audiometry with good  reliability from 250-8000 Hz bilaterally using circumaural headphones and reassessed using insert earphones    RIGHT:  Borderline normal through 2000 Hz sloping to severe sensorineural hearing loss; slight improvement mid/high-frequencies with 10-15 dB decrease 250 and 500 Hz    LEFT:    Normal through 2000 Hz sloping to severe sensorineural hearing loss; stable    Tympanogram:    RIGHT: negative pressure     LEFT:   normal eardrum mobility    Reflexes (reported by stimulus ear):  RIGHT: Ipsilateral is absent at frequencies tested  RIGHT: Contralateral is absent at frequencies tested  LEFT:   Ipsilateral is present at normal levels  LEFT:   Contralateral is absent at frequencies tested      Speech Reception Threshold:    RIGHT: 30 dB HL    LEFT:   15 dB HL  Word Recognition Score:     RIGHT: 100% at 65 dB HL using NU-6 recorded word list; stable    LEFT:   88% at 60 dB HL using NU-6 recorded word list; slight  decrease (100% 2011)    PLAN:  Patient was counseled regarding hearing examination results. Patient is not an ideal candidate for hearing aids at this time.  It is recommended that the patient return bi-annually for monitoring of hearing status, sooner if changes in hearing are noted.  Please call this clinic with questions regarding these results or recommendations.        Cherry Sim.  Licensed Audiologist  [place] #[id number]

## 2018-02-16 ENCOUNTER — TELEPHONE (OUTPATIENT)
Dept: DERMATOLOGY | Facility: CLINIC | Age: 83
End: 2018-02-16

## 2018-02-16 ENCOUNTER — THERAPY VISIT (OUTPATIENT)
Dept: PHYSICAL THERAPY | Facility: CLINIC | Age: 83
End: 2018-02-16
Payer: COMMERCIAL

## 2018-02-16 DIAGNOSIS — R26.81 GAIT INSTABILITY: ICD-10-CM

## 2018-02-16 DIAGNOSIS — Z91.81 AT HIGH RISK FOR FALLS: ICD-10-CM

## 2018-02-16 DIAGNOSIS — M62.81 GENERALIZED MUSCLE WEAKNESS: ICD-10-CM

## 2018-02-16 DIAGNOSIS — M21.372 LEFT FOOT DROP: Primary | ICD-10-CM

## 2018-02-16 NOTE — MR AVS SNAPSHOT
After Visit Summary   2/16/2018    Noe Florence    MRN: [id number]           Patient Information     Date Of Birth          1935        Visit Information        Provider Department      2/16/2018 9:00 AM Rica Bauman PT M [facility]        Today's Diagnoses     Left foot drop    -  1    Generalized muscle weakness        At high risk for falls        Gait instability           Follow-ups after your visit        Your next 10 appointments already scheduled     Feb 27, 2018  8:30 AM CST   (Arrive by 8:15 AM)   Neuro Treatment with LU Moffett [facility] ([facility])    [address] [place] [address]   [phone]            Mar 02, 2018  8:30 AM CST   (Arrive by 8:15 AM)   Neuro Treatment with LU Moffett [facility] ([facility])    [address] [place] [address]   [phone]            Mar 06, 2018  9:45 AM CST   (Arrive by 9:30 AM)   Neuro Treatment with LU Moffett [facility] ([facility])    [address] [place] [address]   [phone]            Mar 09, 2018  9:45 AM CST   (Arrive by 9:30 AM)   Neuro Treatment with LU Moffett [facility] ([facility])    [address] [place] [address]   [phone]            Mar 13, 2018  9:00 AM CDT   (Arrive by 8:45 AM)   Neuro Treatment with LU Melara [facility] ([facility])    [address] [place] [address]   [phone]            Mar 16, 2018  9:00 AM CDT   (Arrive by 8:45 AM)   Neuro Treatment with LU Melara [facility] ([facility])    [address] [place] [address]   [phone]            Mar 20, 2018 11:15 AM CDT   (Arrive by 11:00 AM)   Neuro  Treatment with Rica Bauman PT   [facility] ([facility])    [address] [facility] [address]   [phone]            Mar 23, 2018 11:15 AM CDT   (Arrive by 11:00 AM)   Neuro Treatment with Rica Bauman PT   [facility] ([facility])    [address]  [address] [facility] [address]   [phone]            Mar 27, 2018 11:15 AM CDT   (Arrive by 11:00 AM)   Neuro Treatment with Rica Bauman PT   [facility] ([facility])    [address]  [address] [facility] [address]   [phone]            Mar 30, 2018 11:40 AM CDT   (Arrive by 11:25 AM)   CT CHEST/ABDOMEN/PELVIS W CONTRAST with [facility]   [facility] ([facility])    [address]  [address] [facility] [address]   [phone]           Please bring any scans or X-rays taken at other hospitals, if similar tests were done. Also bring a list of your medicines, including vitamins, minerals and over-the-counter drugs. It is safest to leave personal items at home.  Be sure to tell your doctor:   If you have any allergies.   If there s any chance you are pregnant.   If you are breastfeeding.  You may have contrast for this exam. To prepare:   Do not eat or drink for 2 hours before your exam. If you need to take medicine, you may take it with small sips of water. (We may ask you to take liquid medicine as well.)   The day before your exam, drink extra fluids at least six 8-ounce glasses (unless your doctor tells you to restrict your fluids).   You will be given instructions on how to drink the contrast.  Patients over 70 or patients with diabetes or kidney problems:   If you haven t had a blood test (creatinine test) within the last 30 days, the Cardiologist/Radiologist may require you to get this test prior to your exam.  If you have diabetes:   Continue to take your metformin  medication on the day of your exam  Please wear loose clothing, such as a sweat suit or jogging clothes. Avoid snaps, zippers and other metal. We may ask you to undress and put on a hospital gown.  If you have any questions, please call the Imaging Department where you will have your exam.              Who to contact     Please call your clinic at [phone] to:    Ask questions about your health    Make or cancel appointments    Discuss your medicines    Learn about your test results    Speak to your doctor            Additional Information About Your Visit        [employer] Information     [employer] gives you secure access to your electronic health record. If you see a primary care provider, you can also send messages to your care team and make appointments. If you have questions, please call your primary care clinic.  If you do not have a primary care provider, please call [phone] and they will assist you.      [employer] is an electronic gateway that provides easy, online access to your medical records. With [employer], you can request a clinic appointment, read your test results, renew a prescription or communicate with your care team.     To access your existing account, please contact your [facility] or call [phone] for assistance.        Care EveryWhere ID     This is your Care EveryWhere ID. This could be used by other organizations to access your [facility] medical records  [id number]         Blood Pressure from Last 3 Encounters:   02/12/18 95/51   01/15/18 123/67   12/21/17 105/64    Weight from Last 3 Encounters:   01/15/18 67.8 kg (149 lb 8 oz)   12/21/17 68.2 kg (150 lb 6.4 oz)   12/05/17 68.9 kg (152 lb)              Today, you had the following     No orders found for display       Primary Care Provider Office Phone # Fax #    Roberto Sarmiento [phone]       [address] [place] [address]        Equal Access to Services      [facility] UnitDONOVAN : Hadii aad ku betzaida Bains, waaxda luqadaha, qaybta kaalmada adesu, sil flavioin hayaajesse easleyidalia bernabe bertram . So [facility] [phone].    ATENCIÓN: Si habla espmartha, tiene a aguirre disposición servicios gratuitos de asistencia lingüística. Llame al [phone].    We comply with applicable federal civil rights laws and [place] laws. We do not discriminate on the basis of race, color, national origin, age, disability, sex, sexual orientation, or gender identity.            Thank you!     Thank you for choosing [facility]  for your care. Our goal is always to provide you with excellent care. Hearing back from our patients is one way we can continue to improve our services. Please take a few minutes to complete the written survey that you may receive in the mail after your visit with us. Thank you!             Your Updated Medication List - Protect others around you: Learn how to safely use, store and throw away your medicines at www.disposemymeds.org.          This list is accurate as of 2/16/18  7:24 PM.  Always use your most recent med list.                   Brand Name Dispense Instructions for use Diagnosis    aspirin 81 MG tablet      Take 1 tablet by mouth daily.        atorvastatin 40 MG tablet    LIPITOR    100 tablet    Take 1 tablet (40 mg) by mouth daily    Hyperlipidemia, unspecified hyperlipidemia type       bacitracin ointment     28 g    Apply topically 2 times daily APPLY TO LEFT LEG TWO TIMES PER DAY    Left leg cellulitis       Blood Pressure Monitor Kit     1 kit    1 Units daily    Hypertension       CENTRUM SILVER per tablet      Take 1 tablet by mouth daily. AM        ciclopirox 0.77 % cream    LOPROX    90 g    Apply topically 2 times daily To feet and toenails.    Dermatophytosis of nail, Tinea pedis of both feet       cyanocobalamin 1000 MCG tablet    vitamin  B-12    180 tablet    Take 2 tablets (2,000 mcg) by mouth daily    Anemia       doxazosin 2 MG tablet    CARDURA     90 tablet    Take 1/2 tab in the morning and 1/2 tab in the evening    Essential hypertension       fluticasone 50 MCG/ACT spray    FLONASE    3 Package    Spray 2 sprays into both nostrils daily    Unspecified sinusitis (chronic)       ketoconazole 2 % cream    NIZORAL    60 g    Apply topically daily On hold    Tinea corporis       loratadine 10 MG tablet    CLARITIN     Take 10 mg by mouth as needed        metoprolol tartrate 25 MG tablet    LOPRESSOR    180 tablet    Take 1 tablet (25 mg) by mouth 2 times daily    Coronary artery disease involving native heart without angina pectoris, unspecified vessel or lesion type       mirtazapine 15 MG tablet    REMERON     Take 15 mg by mouth At Bedtime.        order for DME     1 Units    20-30 mm Hg knee length compression stockings.  Measure and fit.  Style and color per patient preference.  Doff n Aracelis per patient need.    PVD (peripheral vascular disease) (H), Ulcer of left lower extremity, limited to breakdown of skin (H)       triamcinolone 0.1 % cream    KENALOG    80 g    Apply topically 2 times daily For up to two weeks in a row    Atopic eczema       warfarin 5 MG tablet    COUMADIN    35 tablet    7.5 mg on Wed; 5 mg all other days  or as instructed by coumadin clinic.    Atrial flutter (H)       ZOLOFT 100 MG tablet   Generic drug:  sertraline      Take 100 mg by mouth every evening.

## 2018-02-16 NOTE — TELEPHONE ENCOUNTER
Calling because Rx was not sent to pharmacy. Writer located medication in LOV and informed Rica (patient's parnter) that it most likely was not sent because it is an OTC product. She is okay with this. Will send to [facility] for review if medication should have been sent as Rx    LOV 2/12/18    Impression/Plan:  1. Stasis Dermatitis    Given underlying cardiac history combined with venous insufficiency, chronic venous stasis and subsequent development of stasis dermatitis with pitting edema is present in the bilateral lower legs.  Discussed the importance of low-sodium diet, compression stockings, and elevation of the lower extremities to minimize edema.  Also emphasized the importance of adequate skin hydration and monitoring for signs of skin breakdown.     2. Tinea Pedis of bilateral feet and Tinea Corpus of right anterior and medial shin    Skin scraping and KOH performed of the right sole of foot, which was convincing for fungal elements.    Discussed etiology and natural history of this dermatophyte infection.  Advised careful drying between toes after showering with either a towel or hairdryer.  Apply terbinafine cream to feet and between toes, as well as to the anterior shin BID for 2 weeks or until clear.     3. Seborrheic Keratoses    Discussed the benign nature of these lesions    Cryotherapy procedure note (performed with medical student participation): After verbal consent and discussion of risks and benefits including but no limited to dyspigmentation/scar, blister, infection, recurrence, and pain, 5 seborrheic keratoses were treated with 1-2mm freeze border for 2 cycles with liquid nitrogen. Post cryotherapy instructions were provided.     4. Actinic Keratosis of the left temporal scalp    Discussed the nature of lesion without evidence of atypia.  Discussed signs and symptoms of skin cancer, including changes in size, shape, bleeding, nodularity, or tenderness of lesions.    Cryotherapy procedure note  (performed by faculty): After verbal consent and discussion of risks and benefits including but no limited to dyspigmentation/scar, blister, infection, recurrence, and pain 1 actinic keratosis was treated with 1-2mm freeze border for 2 cycles with liquid nitrogen. Post cryotherapy instructions were provided.      CC Dr. Roberto Sarmiento on close of this encounter.  Follow-up in 3 months, earlier for new or changing lesions.      Staff Involved:  Scribed by Cheri Chavira, MS4 for Dr. Jackson.

## 2018-02-17 NOTE — PROGRESS NOTES
" 02/16/18 0900   General Information   Start of Care Date 02/16/18   Referring Physician DR Sarmiento   Orders Evaluate and Treat as Indicated   Order Date 01/15/18   Medical Diagnosis left foot drop. balance problem   Onset of illness/injury or Date of Surgery (chronic foot drop, EMG in 2015)   Special Instructions extensive cardiac hx: ICM,   Surgical/Medical history reviewed Yes  (PMR, B knee surgeries (2 on left and 1 right), shdr sx)   Pertinent history of current problem (include personal factors and/or comorbidities that impact the POC) I have some balance problems. Has an AFO \"I just dont use it\", its not in my routine. Has only used it once. Sometimes I move easily other times its a struggle. Sometimes I can't move at all.    Pertinent Visual History  glasses   Prior level of function comment hires out for yard work. Rica does some shoveling and Bills son helps. He has played tennis for most of his life but had to stop a few years ago. He has never driven a car: always bus or walking.   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 using it in and out of the house. helpful on the stairs.    Fall Risk Screen   Fall screen completed by PT   Have you fallen 2 or more times in the past year? Yes  (about 5 times last year. )   Have you fallen and had an injury in the past year? No  (needs help to get up, 3 times strangers helped)   Is patient a fall risk? Yes   Fall screen comments in the house scoots to " stairway to get up if he falls.    Pain   Patient currently in pain No   Pain comments sometimes getting up from a chair is a strain.    Cognitive Status Examination   Cognitive Comment He cannot answer many of questions. Rica has to answer. He doesnt appear to understand some of my questions or remember info from the past.   Integumentary   Integumentary Comments has been prescribed compression stockings and has an appt next week. Legs below the knees:very red, eczema??, swelling both legs with left leg worse.    Posture   Posture Forward head position;Protracted shoulders;Kyphosis   Posture Comments Standing against the wall his head is about 5 inches from the wall. Forward head of shoulders is 2-3 inches. Shoulders are 2 inches from wall and with manual pressure I cannot get them to the wall so chronic posture issue.   Range of Motion (ROM)   ROM Comment CROM limited by about 50% for rotation. Extension is just past neutral.  ARm flexion is about 140 degrees bilaterally.   Strength   Strength Comments couch and car are hard to get up from. Bed is high. Toilet he has grab bar.   Locomotion   Wheel Chair Mobility Comments 25fTW with cane 9.91 seconds and 16 steps. REpeat 10.28 seconds and 18 steps.    Gait   Gait Comments 25fTW at fast speed 8.87 seconds and 17 steps.   Stairs   Self Performance Minimal assist   Physical/Nonphysical Assist: Stairs 1 person assist   Rails 2 rails  (tandem pattern)   Indicate number of stairs 4  (left leg is weak when descending, limited control)   Gait Special Tests 25 Foot Timed Walk   Seconds 10.8   Steps 18 Steps   Comments decreased swing on left arm compared to right, normal SARAH. left foot intial contact is with forefoot but its not a true foot slap. It looks like a contracture/stiffness but I did not test this. He is flexed at waist and upper body poor posture, gaze is at the ground.    Gait Special Tests Dynamic Gait Index   Score out of 24 14   Comments The more he walks  the slower he gets   Balance Special Tests Sit to Stand Reps in 30 Seconds   Comments cannot get up from 23 inch ht surface without his hands but he has some control of stand to sit  at this ht. Left leg may be weaker leg.    Clinical Impression   Criteria for Skilled Therapeutic Interventions Met yes, treatment indicated   PT Diagnosis signifcant leg weakness (left worse) and high fall risk.   Influenced by the following impairments posture, weakness throught body, balance, L/E ROM, fatigue, and cognition   Functional limitations due to impairments affects ADLS/IADLS, hosuehold/community mobility and recreational activity   Clinical Presentation Evolving/Changing   Clinical Presentation Rationale multiple chronic medical issues, hx of falls, cognition, gait speed/quality, 4 item DGI, sit to stand/leg strength, gait tolerance, transfers and very limited activity tolerance/fatigue   Clinical Decision Making (Complexity) Moderate complexity   Therapy Frequency 2 times/Week   Predicted Duration of Therapy Intervention (days/wks) 3 months or more   Risk & Benefits of therapy have been explained Yes   Patient, Family & other staff in agreement with plan of care Yes   Clinical Impression Comments Complex/chronic medical issues have affected his mobility. He is at high risk for falls. He needs intense rehab and he needs ot be able to follow through with the home program which coudl be an issue. An OT eval may also be beneficial to evaluate his cognition. I recommend he see neurology.   Goal 1   Goal Identifier gait speed/tolerance   Goal Description Amb 25feet with appropriate AD for 5 minutes or longer at a speed of .8 m/s consistently for household/community mobility   Target Date 05/16/18   Goal 2   Goal Identifier adaptive equipment   Goal Description He will identify if any adaptive equipment is helpful for transfers (car transfer bar, bed rail, toilet safety frame)   Target Date 05/16/18   Goal 3   Goal Identifier  HEP   Goal Description They will be able to do HEP of stretches for posture, left leg and strengthening on a regular basis.to address some of his impaiments   Target Date 05/16/18   Goal 4   Goal Identifier further goals to be set after further assesment of his legs   Total Evaluation Time   Total Evaluation Time (Minutes) 49  (they were over 15 minutes late to appt)   Nicolette Bauman DPT, MPT, NCS

## 2018-02-19 ENCOUNTER — ANTICOAGULATION THERAPY VISIT (OUTPATIENT)
Dept: ANTICOAGULATION | Facility: CLINIC | Age: 83
End: 2018-02-19

## 2018-02-19 ENCOUNTER — TELEPHONE (OUTPATIENT)
Dept: INTERNAL MEDICINE | Facility: CLINIC | Age: 83
End: 2018-02-19

## 2018-02-19 DIAGNOSIS — R26.89 BALANCE PROBLEMS: Primary | ICD-10-CM

## 2018-02-19 DIAGNOSIS — I48.0 PAROXYSMAL ATRIAL FIBRILLATION (H): ICD-10-CM

## 2018-02-19 DIAGNOSIS — D64.9 ANEMIA, UNSPECIFIED TYPE: ICD-10-CM

## 2018-02-19 DIAGNOSIS — Z79.01 LONG-TERM (CURRENT) USE OF ANTICOAGULANTS: ICD-10-CM

## 2018-02-19 LAB
INR PPP: 2.44 (ref 0.86–1.14)
LDH SERPL L TO P-CCNC: 199 U/L (ref 85–227)

## 2018-02-19 NOTE — PROGRESS NOTES
ANTICOAGULATION FOLLOW-UP CLINIC VISIT    Patient Name:  Noe Florence  Date:  2/19/2018  Contact Type:  Telephone    SUBJECTIVE:        OBJECTIVE    INR   Date Value Ref Range Status   02/19/2018 2.44 (H) 0.86 - 1.14 Final       ASSESSMENT / PLAN  INR assessment THER    Recheck INR In: 3 WEEKS    INR Location Clinic      Anticoagulation Summary as of 2/19/2018     INR goal 2.0-3.0   Today's INR 2.44   Maintenance plan 5 mg (5 mg x 1) every day   Full instructions 5 mg every day   Weekly total 35 mg   No change documented Danya Edwards RN   Plan last modified Nguyen Zuniga RN (12/21/2017)   Next INR check 3/12/2018   Priority INR   Target end date Indefinite    Indications   Atrial fibrillation (H) [I48.91] [I48.91]  Long-term (current) use of anticoagulants [Z79.01] [Z79.01]         Anticoagulation Episode Summary     INR check location     Preferred lab     Send INR reminders to [facility]    Comments Patient contact number: [phone]   Can leave results with Rica (friend)      Anticoagulation Care Providers     Provider Role Specialty Phone number    Deedee Baird MD Responsible Cardiology [phone]            See the Encounter Report to view Anticoagulation Flowsheet and Dosing Calendar (Go to Encounters tab in chart review, and find the Anticoagulation Therapy Visit)    Left message for patient with results and dosing recommendations. Asked patient to call back to report any missed doses, falls, signs and symptoms of bleeding or clotting, any changes in health, medication, or diet. Asked patient to call back with any questions or concerns.     Danya Edwards, BYRON

## 2018-02-19 NOTE — TELEPHONE ENCOUNTER
Pt's wife called clinic, stated that physical therapist recommended neurology referral for balance problems. Pt will also continue to work with PT. Will route to provider for review.    Ximena Vasquez RN

## 2018-02-19 NOTE — MR AVS SNAPSHOT
Noe Florence   2/19/2018   Anticoagulation Therapy Visit    Description:  82 year old male   Provider:  Danya Edwards RN   Department:  [facility]           INR as of 2/19/2018     Today's INR 2.44      Anticoagulation Summary as of 2/19/2018     INR goal 2.0-3.0   Today's INR 2.44   Full instructions 5 mg every day   Next INR check 3/12/2018    Indications   Atrial fibrillation (H) [I48.91] [I48.91]  Long-term (current) use of anticoagulants [Z79.01] [Z79.01]         February 2018 Details    Sun Mon Tue Wed Thu Fri Sat         1               2               3                 4               5               6               7               8               9               10                 11               12               13               14               15               16               17                 18               19      5 mg   See details      20      5 mg         21      5 mg         22      5 mg         23      5 mg         24      5 mg           25      5 mg         26      5 mg         27      5 mg         28      5 mg             Date Details   02/19 This INR check               How to take your warfarin dose     To take:  5 mg Take 1 of the 5 mg tablets.           March 2018 Details    Sun Mon Tue Wed Thu Fri Sat         1      5 mg         2      5 mg         3      5 mg           4      5 mg         5      5 mg         6      5 mg         7      5 mg         8      5 mg         9      5 mg         10      5 mg           11      5 mg         12            13               14               15               16               17                 18               19               20               21               22               23               24                 25               26               27               28               29               30               31                Date Details   No additional details    Date of next INR:  3/12/2018         How to take your  warfarin dose     To take:  5 mg Take 1 of the 5 mg tablets.

## 2018-02-20 ENCOUNTER — THERAPY VISIT (OUTPATIENT)
Dept: PHYSICAL THERAPY | Facility: CLINIC | Age: 83
End: 2018-02-20
Payer: COMMERCIAL

## 2018-02-20 DIAGNOSIS — M21.372 LEFT FOOT DROP: ICD-10-CM

## 2018-02-20 DIAGNOSIS — M21.379 FOOT-DROP, UNSPECIFIED LATERALITY: Primary | ICD-10-CM

## 2018-02-20 DIAGNOSIS — M62.81 GENERALIZED MUSCLE WEAKNESS: ICD-10-CM

## 2018-02-20 DIAGNOSIS — R63.4 WEIGHT LOSS, UNINTENTIONAL: ICD-10-CM

## 2018-02-20 DIAGNOSIS — M40.00 ACQUIRED POSTURAL KYPHOSIS: ICD-10-CM

## 2018-02-20 DIAGNOSIS — R26.81 GAIT INSTABILITY: ICD-10-CM

## 2018-02-20 DIAGNOSIS — Z91.81 AT HIGH RISK FOR FALLS: Primary | ICD-10-CM

## 2018-02-20 LAB
EPO SERPL-ACNC: 16 MU/ML (ref 4–27)
HAPTOGLOB SERPL-MCNC: 180 MG/DL (ref 35–175)
STFR SERPL-SCNC: 3 MG/L (ref 2.2–5)

## 2018-02-25 PROBLEM — L82.0 INFLAMED SEBORRHEIC KERATOSIS: Status: ACTIVE | Noted: 2018-02-25

## 2018-02-25 PROBLEM — L57.0 ACTINIC KERATOSIS: Status: ACTIVE | Noted: 2018-02-25

## 2018-02-25 PROBLEM — I87.2 VENOUS STASIS DERMATITIS OF BOTH LOWER EXTREMITIES: Status: ACTIVE | Noted: 2018-02-25

## 2018-02-25 PROBLEM — B35.3 TINEA PEDIS OF BOTH FEET: Status: ACTIVE | Noted: 2018-02-25

## 2018-02-27 ENCOUNTER — THERAPY VISIT (OUTPATIENT)
Dept: PHYSICAL THERAPY | Facility: CLINIC | Age: 83
End: 2018-02-27
Payer: COMMERCIAL

## 2018-02-27 DIAGNOSIS — Z91.81 AT HIGH RISK FOR FALLS: ICD-10-CM

## 2018-02-27 DIAGNOSIS — M21.372 LEFT FOOT DROP: ICD-10-CM

## 2018-02-27 DIAGNOSIS — R26.81 GAIT INSTABILITY: Primary | ICD-10-CM

## 2018-02-27 DIAGNOSIS — M40.00 ACQUIRED POSTURAL KYPHOSIS: ICD-10-CM

## 2018-02-27 DIAGNOSIS — M62.81 GENERALIZED MUSCLE WEAKNESS: ICD-10-CM

## 2018-03-02 ENCOUNTER — THERAPY VISIT (OUTPATIENT)
Dept: PHYSICAL THERAPY | Facility: CLINIC | Age: 83
End: 2018-03-02
Payer: COMMERCIAL

## 2018-03-02 DIAGNOSIS — M40.00 ACQUIRED POSTURAL KYPHOSIS: ICD-10-CM

## 2018-03-02 DIAGNOSIS — M62.81 GENERALIZED MUSCLE WEAKNESS: ICD-10-CM

## 2018-03-02 DIAGNOSIS — R26.81 GAIT INSTABILITY: Primary | ICD-10-CM

## 2018-03-02 DIAGNOSIS — M21.372 LEFT FOOT DROP: ICD-10-CM

## 2018-03-02 DIAGNOSIS — Z91.81 AT HIGH RISK FOR FALLS: ICD-10-CM

## 2018-03-06 ENCOUNTER — THERAPY VISIT (OUTPATIENT)
Dept: PHYSICAL THERAPY | Facility: CLINIC | Age: 83
End: 2018-03-06
Payer: COMMERCIAL

## 2018-03-06 DIAGNOSIS — M62.81 GENERALIZED MUSCLE WEAKNESS: ICD-10-CM

## 2018-03-06 DIAGNOSIS — M40.00 ACQUIRED POSTURAL KYPHOSIS: ICD-10-CM

## 2018-03-06 DIAGNOSIS — R26.81 GAIT INSTABILITY: Primary | ICD-10-CM

## 2018-03-06 DIAGNOSIS — Z91.81 AT HIGH RISK FOR FALLS: ICD-10-CM

## 2018-03-09 ENCOUNTER — THERAPY VISIT (OUTPATIENT)
Dept: PHYSICAL THERAPY | Facility: CLINIC | Age: 83
End: 2018-03-09
Payer: COMMERCIAL

## 2018-03-09 DIAGNOSIS — M40.00 ACQUIRED POSTURAL KYPHOSIS: ICD-10-CM

## 2018-03-09 DIAGNOSIS — M62.81 GENERALIZED MUSCLE WEAKNESS: ICD-10-CM

## 2018-03-09 DIAGNOSIS — M21.372 LEFT FOOT DROP: ICD-10-CM

## 2018-03-09 DIAGNOSIS — R26.81 GAIT INSTABILITY: Primary | ICD-10-CM

## 2018-03-09 DIAGNOSIS — Z91.81 AT HIGH RISK FOR FALLS: ICD-10-CM

## 2018-03-13 ENCOUNTER — THERAPY VISIT (OUTPATIENT)
Dept: OCCUPATIONAL THERAPY | Facility: CLINIC | Age: 83
End: 2018-03-13
Payer: COMMERCIAL

## 2018-03-13 ENCOUNTER — ANTICOAGULATION THERAPY VISIT (OUTPATIENT)
Dept: ANTICOAGULATION | Facility: CLINIC | Age: 83
End: 2018-03-13

## 2018-03-13 ENCOUNTER — THERAPY VISIT (OUTPATIENT)
Dept: PHYSICAL THERAPY | Facility: CLINIC | Age: 83
End: 2018-03-13
Payer: COMMERCIAL

## 2018-03-13 DIAGNOSIS — R26.81 GAIT INSTABILITY: Primary | ICD-10-CM

## 2018-03-13 DIAGNOSIS — Z79.01 LONG-TERM (CURRENT) USE OF ANTICOAGULANTS: ICD-10-CM

## 2018-03-13 DIAGNOSIS — I48.0 PAROXYSMAL ATRIAL FIBRILLATION (H): ICD-10-CM

## 2018-03-13 DIAGNOSIS — Z78.9 IMPAIRED ACTIVITIES OF DAILY LIVING: Primary | ICD-10-CM

## 2018-03-13 DIAGNOSIS — M21.372 LEFT FOOT DROP: ICD-10-CM

## 2018-03-13 LAB — INR PPP: 2.28 (ref 0.86–1.14)

## 2018-03-13 NOTE — MR AVS SNAPSHOT
After Visit Summary   3/13/2018    Noe Florence    MRN: [id number]           Patient Information     Date Of Birth          1935        Visit Information        Provider Department      3/13/2018 1:00 PM Courtney Meehan OT [facility] Occupational Therapy and Rehab        Today's Diagnoses     Impaired activities of daily living    -  1       Follow-ups after your visit        Your next 10 appointments already scheduled     Mar 16, 2018  1:00 PM CDT   (Arrive by 12:45 PM)   Neuro Treatment with Leena Moss PT   [facility] ([facility])    [address] [place] [address]  4th [facility] [address]   [phone]            Mar 20, 2018 11:15 AM CDT   (Arrive by 11:00 AM)   Neuro Treatment with Rica Bauman PT   [facility] ([facility])    [address] [place] [address]   [phone]            Mar 23, 2018  7:30 AM CDT   (Arrive by 7:15 AM)   New Patient Visit with Clemente Garcia MD   [facility] Neurology ([facility])    [address] [place] [address]   [phone]            Mar 23, 2018  8:30 AM CDT   (Arrive by 8:15 AM)   Neuro Treatment with Rica Bauman PT   [facility] ([facility])    [address] [place] [address]   [phone]            Mar 27, 2018 11:15 AM CDT   (Arrive by 11:00 AM)   Neuro Treatment with Rica Bauman PT   [facility] ([facility])    [address] [place] [address]   [phone]            Mar 30, 2018 11:15 AM CDT   LAB with  LAB   [facility] Lab ([facility])    [address] [place] [address]   [phone]           Please do not eat 10-12 hours before your appointment if you are coming in fasting for labs on lipids, cholesterol, or glucose (sugar). This  does not apply to pregnant women. Water, hot tea and black coffee (with nothing added) are okay. Do not drink other fluids, diet soda or chew gum.            Mar 30, 2018 11:40 AM CDT   (Arrive by 11:25 AM)   CT CHEST/ABDOMEN/PELVIS W CONTRAST with UCCT2   [facility] ([facility])    [address] [address] [phone]           Please bring any scans or X-rays taken at other hospitals, if similar tests were done. Also bring a list of your medicines, including vitamins, minerals and over-the-counter drugs. It is safest to leave personal items at home.  Be sure to tell your doctor:   If you have any allergies.   If there s any chance you are pregnant.   If you are breastfeeding.  You may have contrast for this exam. To prepare:   Do not eat or drink for 2 hours before your exam. If you need to take medicine, you may take it with small sips of water. (We may ask you to take liquid medicine as well.)   The day before your exam, drink extra fluids at least six 8-ounce glasses (unless your doctor tells you to restrict your fluids).   You will be given instructions on how to drink the contrast.  Patients over 70 or patients with diabetes or kidney problems:   If you haven t had a blood test (creatinine test) within the last 30 days, the Cardiologist/Radiologist may require you to get this test prior to your exam.  If you have diabetes:   Continue to take your metformin medication on the day of your exam  Please wear loose clothing, such as a sweat suit or jogging clothes. Avoid snaps, zippers and other metal. We may ask you to undress and put on a hospital gown.  If you have any questions, please call the Imaging Department where you will have your exam.            Mar 30, 2018  1:00 PM CDT   (Arrive by 12:45 PM)   Neuro Treatment with Rica Bauman PT   [facility] Re[facility] ([facility])    [address] [place] [address]  Floor  [facility] [id number] [phone]            Apr 02, 2018 10:45 AM CDT   (Arrive by 10:30 AM)   Return Visit with Francisco Gilliam MD   [facility] ([facility])    [address]  [address]  [facility] [id number] [phone]            Apr 03, 2018 11:15 AM CDT   (Arrive by 11:00 AM)   Neuro Treatment with Rica Bauman PT   [facility] Re[facility] ([facility])    [address]  4th Floor  [facility] [address] [phone]              Who to contact     Please call your clinic at [phone] to:    Ask questions about your health    Make or cancel appointments    Discuss your medicines    Learn about your test results    Speak to your doctor            Additional Information About Your Visit        [employer] Information     [employer] gives you secure access to your electronic health record. If you see a primary care provider, you can also send messages to your care team and make appointments. If you have questions, please call your primary care clinic.  If you do not have a primary care provider, please call [phone] and they will assist you.      [employer] is an electronic gateway that provides easy, online access to your medical records. With [employer], you can request a clinic appointment, read your test results, renew a prescription or communicate with your care team.     To access your existing account, please contact your [facility] or call [phone] for assistance.        Care EveryWhere ID     This is your Care EveryWhere ID. This could be used by other organizations to access your [facility] medical records  [id number]         Blood Pressure from Last 3 Encounters:   02/12/18 95/51   01/15/18 123/67   12/21/17 105/64    Weight from Last 3 Encounters:   01/15/18 67.8 kg (149 lb 8 oz)   12/21/17 68.2 kg (150 lb 6.4 oz)   12/05/17 68.9 kg (152 lb)              Today, you  had the following     No orders found for display       Primary Care Provider Office Phone # Fax #    Roberto Sarmiento [phone]       [address] [place] [address]        Equal Access to Services     LEE CASTILLO : Hadii giovanny olsen ritao Soarnoldali, waaxda luqadaha, qaybta kaalmada adeidaliayada, sil bernabe bertram minor. So [facility] [phone].    ATENCIÓN: Si habla español, tiene a aguirre disposición servicios gratuitos de asistencia lingüística. Llame al [phone].    We comply with applicable federal civil rights laws and [place] laws. We do not discriminate on the basis of race, color, national origin, age, disability, sex, sexual orientation, or gender identity.            Thank you!     Thank you for choosing [facility] OCCUPATIONAL THERAPY AND REHAB  for your care. Our goal is always to provide you with excellent care. Hearing back from our patients is one way we can continue to improve our services. Please take a few minutes to complete the written survey that you may receive in the mail after your visit with us. Thank you!             Your Updated Medication List - Protect others around you: Learn how to safely use, store and throw away your medicines at www.disposemymeds.org.          This list is accurate as of 3/13/18 11:59 PM.  Always use your most recent med list.                   Brand Name Dispense Instructions for use Diagnosis    aspirin 81 MG tablet      Take 1 tablet by mouth daily.        atorvastatin 40 MG tablet    LIPITOR    100 tablet    Take 1 tablet (40 mg) by mouth daily    Hyperlipidemia, unspecified hyperlipidemia type       bacitracin ointment     28 g    Apply topically 2 times daily APPLY TO LEFT LEG TWO TIMES PER DAY    Left leg cellulitis       Blood Pressure Monitor Kit     1 kit    1 Units daily    Hypertension       CENTRUM SILVER per tablet      Take 1 tablet by mouth daily. AM        ciclopirox 0.77 % cream    LOPROX    90 g     Apply topically 2 times daily To feet and toenails.    Dermatophytosis of nail, Tinea pedis of both feet       cyanocobalamin 1000 MCG tablet    vitamin  B-12    180 tablet    Take 2 tablets (2,000 mcg) by mouth daily    Anemia       doxazosin 2 MG tablet    CARDURA    90 tablet    Take 1/2 tab in the morning and 1/2 tab in the evening    Essential hypertension       fluticasone 50 MCG/ACT spray    FLONASE    3 Package    Spray 2 sprays into both nostrils daily    Unspecified sinusitis (chronic)       ketoconazole 2 % cream    NIZORAL    60 g    Apply topically daily On hold    Tinea corporis       loratadine 10 MG tablet    CLARITIN     Take 10 mg by mouth as needed        metoprolol tartrate 25 MG tablet    LOPRESSOR    180 tablet    Take 1 tablet (25 mg) by mouth 2 times daily    Coronary artery disease involving native heart without angina pectoris, unspecified vessel or lesion type       mirtazapine 15 MG tablet    REMERON     Take 15 mg by mouth At Bedtime.        order for DME     1 Units    20-30 mm Hg knee length compression stockings.  Measure and fit.  Style and color per patient preference.  Doff n Aracelis per patient need.    PVD (peripheral vascular disease) (H), Ulcer of left lower extremity, limited to breakdown of skin (H)       triamcinolone 0.1 % cream    KENALOG    80 g    Apply topically 2 times daily For up to two weeks in a row    Atopic eczema       warfarin 5 MG tablet    COUMADIN    35 tablet    7.5 mg on Wed; 5 mg all other days  or as instructed by coumadin clinic.    Atrial flutter (H)       ZOLOFT 100 MG tablet   Generic drug:  sertraline      Take 100 mg by mouth every evening.

## 2018-03-13 NOTE — PATIENT INSTRUCTIONS
Try icing your left foot for 15-20 mins to see if it makes it less painful to walk on.    Contact [facility] about podiatry with Dr Boudreaux      Try doing a seated calf stretch for your left foot with a belt instead of standing. Hold 30 secs, 2-3x per side.

## 2018-03-13 NOTE — PROGRESS NOTES
ANTICOAGULATION FOLLOW-UP CLINIC VISIT    Patient Name:  Noe Florence  Date:  3/13/2018  Contact Type:  Telephone    SUBJECTIVE:        OBJECTIVE    INR   Date Value Ref Range Status   03/13/2018 2.28 (H) 0.86 - 1.14 Final       ASSESSMENT / PLAN  INR assessment THER    Recheck INR In: 4 WEEKS    INR Location Clinic      Anticoagulation Summary as of 3/13/2018     INR goal 2.0-3.0   Today's INR 2.28   Maintenance plan 5 mg (5 mg x 1) every day   Full instructions 5 mg every day   Weekly total 35 mg   No change documented Roya Manning, RN   Plan last modified Nguyen Zuniga RN (12/21/2017)   Next INR check 4/10/2018   Priority INR   Target end date Indefinite    Indications   Atrial fibrillation (H) [I48.91] [I48.91]  Long-term (current) use of anticoagulants [Z79.01] [Z79.01]         Anticoagulation Episode Summary     INR check location     Preferred lab     Send INR reminders to [facility]    Comments Patient contact number: [phone]   Can leave results with Rica (friend)      Anticoagulation Care Providers     Provider Role Specialty Phone number    Deedee Baird MD Responsible Cardiology [phone]            See the Encounter Report to view Anticoagulation Flowsheet and Dosing Calendar (Go to Encounters tab in chart review, and find the Anticoagulation Therapy Visit)    Left message for patient with results and dosing recommendations. Asked patient to call back to report any missed doses, falls, signs and symptoms of bleeding or clotting, any changes in health, medication, or diet. Asked patient to call back with any questions or concerns.     Roya Manning RN

## 2018-03-13 NOTE — MR AVS SNAPSHOT
After Visit Summary   3/13/2018    Noe Florence    MRN: [id number]           Patient Information     Date Of Birth          1935        Visit Information        Provider Department      3/13/2018 11:45 AM Leena Moss PT [facility] Re[facility]        Care Instructions    Try icing your left foot for 15-20 mins to see if it makes it less painful to walk on.    Contact [facility] about podiatry with Dr Boudreaux      Try doing a seated calf stretch for your left foot with a belt instead of standing. Hold 30 secs, 2-3x per side.                      Follow-ups after your visit        Your next 10 appointments already scheduled     Mar 13, 2018  1:00 PM CDT   (Arrive by 12:45 PM)   Neuro Eval with Courtney Meehan OT   [facility] Occupational Therapy and Rehab ([facility])    [address] [place] [address]   [phone]            Mar 16, 2018  1:00 PM CDT   (Arrive by 12:45 PM)   Neuro Treatment with Leena Moss PT   [facility] ([facility])    [address] [place] [address]   [phone]            Mar 20, 2018 11:15 AM CDT   (Arrive by 11:00 AM)   Neuro Treatment with Rica Bauman PT   [facility] ([facility])    [address] [place] [address]   [phone]            Mar 23, 2018  7:30 AM CDT   (Arrive by 7:15 AM)   New Patient Visit with Clemente Garcia MD   [facility] Neurology ([facility])    [address] [place] [address]   [phone]            Mar 23, 2018  8:30 AM CDT   (Arrive by 8:15 AM)   Neuro Treatment with Rica Bauman PT   [facility] ([facility])    [address] [place] [address]   [phone]            Mar 27, 2018 11:15 AM CDT   (Arrive by 11:00 AM)   Neuro Treatment with Rica Bauman PT   [facility]  Rehab ([facility])    [address] [place] [address]  4th [facility] [address] [phone]            Mar 30, 2018 11:15 AM CDT   LAB with UC LAB   [facility] ([facility])    [address] [place] [id number] [phone]           Please do not eat 10-12 hours before your appointment if you are coming in fasting for labs on lipids, cholesterol, or glucose (sugar). This does not apply to pregnant women. Water, hot tea and black coffee (with nothing added) are okay. Do not drink other fluids, diet soda or chew gum.            Mar 30, 2018 11:40 AM CDT   (Arrive by 11:25 AM)   CT CHEST/ABDOMEN/PELVIS W CONTRAST with UCCT2   [facility] ([facility])    [address] [place] [address] [phone]           Please bring any scans or X-rays taken at other hospitals, if similar tests were done. Also bring a list of your medicines, including vitamins, minerals and over-the-counter drugs. It is safest to leave personal items at home.  Be sure to tell your doctor:   If you have any allergies.   If there s any chance you are pregnant.   If you are breastfeeding.  You may have contrast for this exam. To prepare:   Do not eat or drink for 2 hours before your exam. If you need to take medicine, you may take it with small sips of water. (We may ask you to take liquid medicine as well.)   The day before your exam, drink extra fluids at least six 8-ounce glasses (unless your doctor tells you to restrict your fluids).   You will be given instructions on how to drink the contrast.  Patients over 70 or patients with diabetes or kidney problems:   If you haven t had a blood test (creatinine test) within the last 30 days, the Cardiologist/Radiologist may require you to get this test prior to your exam.  If you have diabetes:   Continue to take your metformin medication on the day of  your exam  Please wear loose clothing, such as a sweat suit or jogging clothes. Avoid snaps, zippers and other metal. We may ask you to undress and put on a hospital gown.  If you have any questions, please call the Imaging Department where you will have your exam.            Mar 30, 2018  1:00 PM CDT   (Arrive by 12:45 PM)   Neuro Treatment with Rica Bauman PT   [facility] Re[facility] ([facility])    [address]  4th Floor  [facility] [address] [phone]            Apr 02, 2018 10:45 AM CDT   (Arrive by 10:30 AM)   Return Visit with Francisco Gilliam MD   [facility] ([facility])    [address]  [address]  [facility] [address] [phone]              Who to contact     Please call your clinic at [phone] to:    Ask questions about your health    Make or cancel appointments    Discuss your medicines    Learn about your test results    Speak to your doctor            Additional Information About Your Visit        [employer] Information     [employer] gives you secure access to your electronic health record. If you see a primary care provider, you can also send messages to your care team and make appointments. If you have questions, please call your primary care clinic.  If you do not have a primary care provider, please call [phone] and they will assist you.      [employer] is an electronic gateway that provides easy, online access to your medical records. With [employer], you can request a clinic appointment, read your test results, renew a prescription or communicate with your care team.     To access your existing account, please contact your [facility] or call [phone] for assistance.        Care EveryWhere ID     This is your Care EveryWhere ID. This could be used by other organizations to access your [facility] [place] medical records  [id number]         Blood Pressure from Last 3  Encounters:   02/12/18 95/51   01/15/18 123/67   12/21/17 105/64    Weight from Last 3 Encounters:   01/15/18 67.8 kg (149 lb 8 oz)   12/21/17 68.2 kg (150 lb 6.4 oz)   12/05/17 68.9 kg (152 lb)              Today, you had the following     No orders found for display       Primary Care Provider Office Phone # Fax #    Roberto Sarmiento [phone]       [address] [place] [address]        Equal Access to Services     [facility]: Hadii aad ku hadasho Soomaali, waaxda luqadaha, qaybta kaalmada adesu, sil burden . So [facility] [phone].    ATENCIÓN: Si habla español, tiene a aguirre disposición servicios gratuitos de asistencia lingüística. [facility] [phone].    We comply with applicable federal civil rights laws and [place] laws. We do not discriminate on the basis of race, color, national origin, age, disability, sex, sexual orientation, or gender identity.            Thank you!     Thank you for choosing [facility]  for your care. Our goal is always to provide you with excellent care. Hearing back from our patients is one way we can continue to improve our services. Please take a few minutes to complete the written survey that you may receive in the mail after your visit with us. Thank you!             Your Updated Medication List - Protect others around you: Learn how to safely use, store and throw away your medicines at www.disposemymeds.org.          This list is accurate as of 3/13/18 12:29 PM.  Always use your most recent med list.                   Brand Name Dispense Instructions for use Diagnosis    aspirin 81 MG tablet      Take 1 tablet by mouth daily.        atorvastatin 40 MG tablet    LIPITOR    100 tablet    Take 1 tablet (40 mg) by mouth daily    Hyperlipidemia, unspecified hyperlipidemia type       bacitracin ointment     28 g    Apply topically 2 times daily APPLY TO LEFT LEG TWO TIMES PER DAY    Left leg cellulitis        Blood Pressure Monitor Kit     1 kit    1 Units daily    Hypertension       CENTRUM SILVER per tablet      Take 1 tablet by mouth daily. AM        ciclopirox 0.77 % cream    LOPROX    90 g    Apply topically 2 times daily To feet and toenails.    Dermatophytosis of nail, Tinea pedis of both feet       cyanocobalamin 1000 MCG tablet    vitamin  B-12    180 tablet    Take 2 tablets (2,000 mcg) by mouth daily    Anemia       doxazosin 2 MG tablet    CARDURA    90 tablet    Take 1/2 tab in the morning and 1/2 tab in the evening    Essential hypertension       fluticasone 50 MCG/ACT spray    FLONASE    3 Package    Spray 2 sprays into both nostrils daily    Unspecified sinusitis (chronic)       ketoconazole 2 % cream    NIZORAL    60 g    Apply topically daily On hold    Tinea corporis       loratadine 10 MG tablet    CLARITIN     Take 10 mg by mouth as needed        metoprolol tartrate 25 MG tablet    LOPRESSOR    180 tablet    Take 1 tablet (25 mg) by mouth 2 times daily    Coronary artery disease involving native heart without angina pectoris, unspecified vessel or lesion type       mirtazapine 15 MG tablet    REMERON     Take 15 mg by mouth At Bedtime.        order for DME     1 Units    20-30 mm Hg knee length compression stockings.  Measure and fit.  Style and color per patient preference.  Doff n Aracelis per patient need.    PVD (peripheral vascular disease) (H), Ulcer of left lower extremity, limited to breakdown of skin (H)       triamcinolone 0.1 % cream    KENALOG    80 g    Apply topically 2 times daily For up to two weeks in a row    Atopic eczema       warfarin 5 MG tablet    COUMADIN    35 tablet    7.5 mg on Wed; 5 mg all other days  or as instructed by coumadin clinic.    Atrial flutter (H)       ZOLOFT 100 MG tablet   Generic drug:  sertraline      Take 100 mg by mouth every evening.

## 2018-03-13 NOTE — MR AVS SNAPSHOT
Noe Florence   3/13/2018   Anticoagulation Therapy Visit    Description:  82 year old male   Provider:  Roya Manning, RN   Department:  [facility]           INR as of 3/13/2018     Today's INR 2.28      Anticoagulation Summary as of 3/13/2018     INR goal 2.0-3.0   Today's INR 2.28   Full instructions 5 mg every day   Next INR check 4/10/2018    Indications   Atrial fibrillation (H) [I48.91] [I48.91]  Long-term (current) use of anticoagulants [Z79.01] [Z79.01]         March 2018 Details    Sun Mon Tue Wed Thu Fri Sat         1               2               3                 4               5               6               7               8               9               10                 11               12               13      5 mg   See details      14      5 mg         15      5 mg         16      5 mg         17      5 mg           18      5 mg         19      5 mg         20      5 mg         21      5 mg         22      5 mg         23      5 mg         24      5 mg           25      5 mg         26      5 mg         27      5 mg         28      5 mg         29      5 mg         30      5 mg         31      5 mg          Date Details   03/13 This INR check               How to take your warfarin dose     To take:  5 mg Take 1 of the 5 mg tablets.           April 2018 Details    Sun Mon Tue Wed Thu Fri Sat     1      5 mg         2      5 mg         3      5 mg         4      5 mg         5      5 mg         6      5 mg         7      5 mg           8      5 mg         9      5 mg         10            11               12               13               14                 15               16               17               18               19               20               21                 22               23               24               25               26               27               28                 29               30                     Date Details   No additional details     Date of next INR:  4/10/2018         How to take your warfarin dose     To take:  5 mg Take 1 of the 5 mg tablets.

## 2018-03-15 NOTE — PROGRESS NOTES
03/13/18 1300   Quick Adds   Type of Visit Initial Outpatient Occupational Therapy Evaluation   General Information   Start Of Care Date 03/13/18   Referring Physician Dr. Sarmiento    Orders Evaluate and treat as indicated   Orders Date 02/20/18   Medical Diagnosis Generalized muscle weakness M62.81    Onset of Illness/Injury or Date of Surgery (chronic weakness, falls and instability )   Precautions/Limitations Fall precautions   Surgical/Medical History Reviewed Yes   Additional Occupational Profile Info/Pertinent History of Current Problem Patient reports that he has had chronic balance problems for some time now, frequently, when asked questions his partner will answer for him and provide information in regards to patient s function.  Patient reports that he believes to be of  sound mind  but that his biggest complaint/concern is the clutter that is in his home, which he believes to be the attributing to his frequent falls and tripping.  Patient reports that he has never driven and has always taken the bus or walked everywhere.  He also reports that he has, in the past been an avid  and would like to return to the game in some fashion in the future.     Role/Living Environment   Current Community Support Family/friend caregiver  (Rica )   Patient role/Employment history Retired   Community/Avocational Activities Tennis, Reading, Watching the news    Current Living Environment House   Primary Bathroom Location/Comments Main floor    Primary Bathroom Set Up/Equipment Shower stall;Shower grab bar;Toilet grab bar   Additional Bathroom Location/Comments Second floor    Additional Bathroom Set Up/Equipment Tub/shower combo  (patient reports that he only utilizes the toilet )   Prior Level - Transfers Assistive equipment   Prior Level - Ambulation Assistive equipment   Prior Level - ADLS Independent   Prior Responsibilities - IADL Meal Preparation;Housekeeping;Laundry;Medication management;Finances    Current Assistive Devices - Mobility Standard cane  (Has an AFO )   Current Assistive Devices - ADL Bath sponge   Role/Living Environment Comments Patient lives with his S.O. Rica in a house that is multi-level.  Patient reports that he utilizes the downstairs bathroom which has a shower stall and grab bars around the toilet and in the shower as well as access to a shower bench.     Patient/family Goals Statement To be able to play tennis in some capacity again, even if it's just hitting the balls from a machine for some exercise.    Pain   Patient currently in pain Yes   Pain location R leg    Pain rating 4/10    Pain description Deep;Ache;Throbbing   Fall Risk Screen   Fall screen completed by OT   Have you fallen 2 or more times in the past year? Yes   Have you fallen and had an injury in the past year? No   Is patient a fall risk? Yes;Department fall risk interventions implemented   Cognitive Status Examination   Orientation Orientation to person, place and time   Level of Consciousness Alert   Follows Commands and Answers Questions Able to follow multistep instructions  (requires repeition at times for directions )   Personal Safety and Judgment Intact   Memory Intact  (4/5 on the MOCA )   Attention Distractible during evaluation;Sustained attention impaired  (Tangential, needing redirection to conversation. )   Organization/Problem Solving Problem solving impaired   Executive Function Cognitive flexibility impaired   Visual Perception   Visual Perception Wears glasses   Sensation   Upper Extremity Sensory Examination No deficits were identified   Posture   Posture Forward head position;Protracted shoulders   Range of Motion (ROM)   ROM Quick Adds No deficits identified   ROM Comments UE ROM WFL    Strength   Strength Comments Generalized weakness throughout UE's noted    Hand Strength   Hand Dominance Ambidextrous   Coordination   Upper Extremity Coordination No deficits were identified   Bathing   Level  of [place] - Bathing independent   Upper Body Dressing   Level of [place]: Dress Upper Body independent   Lower Body Dressing   Level of [place]: Dress Lower Body independent   Toileting   Level of [place]: Toilet independent   Grooming   Level of [place]: Grooming independent   Eating/Self-Feeding   Level of [place]: Eating independent   Activity Tolerance   Activity Tolerance Poor activity tolerance    Planned Therapy Interventions   Planned Therapy Interventions Therapeutic activities;Strengthening;Self care/Home management   Adult OT Eval Goals   OT Eval Goals (Adult) 1;2;3   OT Goal 1   Goal Identifier Strengthening    Goal Description Patient will demonstrate independence in UE strengthening exercises as needed to increase UE strength for efficiency in transfers    Target Date 04/18/18   OT Goal 2   Goal Identifier Falls prevention    Goal Description Patient will verbalize and demonstrate understanding of 3-5 falls prevention strategies as needed to prevent future falls and for general safety    Target Date 04/18/18   OT Goal 3   Goal Identifier Cognition    Goal Description Patient will verbalize understanding of cognitive testing results and indications for IADL's    Target Date 04/13/18   Clinical Impression   Criteria for Skilled Therapeutic Interventions Met Yes, treatment indicated   OT Diagnosis Decreased independence, safety and efficiency in ADL's, IADL's and functional transfers    Influenced by the following impairments impaired balance, generalized weakness    Assessment of Occupational Performance 1-3 Performance Deficits   Identified Performance Deficits bathing, meal prep, functional transfers    Clinical Decision Making (Complexity) Low complexity   Therapy Frequency 1x/week    Predicted Duration of Therapy Intervention (days/wks) 6 weeks    Risks and Benefits of Treatment have been explained. Yes   Patient, Family & other staff in agreement with plan of care Yes    Education Assessment   Barriers To Learning No Barriers   Preferred Learning Style Listening;Demonstration;Pictures/video   Total Evaluation Time   Total Evaluation Time 15        Thank you for referring Noe to Occup[facility] Therapy[facility]

## 2018-03-16 ENCOUNTER — THERAPY VISIT (OUTPATIENT)
Dept: PHYSICAL THERAPY | Facility: CLINIC | Age: 83
End: 2018-03-16
Payer: COMMERCIAL

## 2018-03-16 DIAGNOSIS — M62.81 GENERALIZED MUSCLE WEAKNESS: Primary | ICD-10-CM

## 2018-03-16 DIAGNOSIS — R26.81 GAIT INSTABILITY: ICD-10-CM

## 2018-03-16 NOTE — MR AVS SNAPSHOT
After Visit Summary   3/16/2018    Noe Florence    MRN: [id number]           Patient Information     Date Of Birth          1935        Visit Information        Provider Department      3/16/2018 1:00 PM Leena Moss, PT [facility]        Care Instructions    Bridging with weight bearing in heels - 5 sec holds            Straight leg raises  - squeeze knee straight and then lift only to the high of your other thigh          Side planks  Easier version:  - Both knees bent, elbow under shoulder and lift hips for 5 secs, 5+ reps per side          Harder version:  - Same as above, but lift and lower top leg while holding your hips up          Hip extension on your belly  **stop this if back pain**  - Roll over to your stomach  - lift one thigh off of the bed, 5 secs holds, 10x per leg          Laying posture check (as if you were standing against the door)  - Push head back with no pillow under head, shoulders pinch          Seated calf stretch with the belt  - Don't put the belt on your dressing  - 30 secs, 2-3x per leg                  Follow-ups after your visit        Your next 10 appointments already scheduled     Mar 20, 2018  3:00 PM CDT   Treatment 60 with Courtney Meehan OT   [facility] Re[facility] ([facility])    [address] [address] [phone]            Mar 23, 2018  7:30 AM CDT   (Arrive by 7:15 AM)   New Patient Visit with Clemente Garcia MD   [facility] Neurology ([facility])    [address] [facility]  [address]rd [facility] [address] [phone]            Mar 27, 2018 11:15 AM CDT   (Arrive by 11:00 AM)   Neuro Treatment with Rica Bauman PT   [facility] ([facility])    [address] [address] [phone]            Mar 30, 2018 11:15 AM CDT   LAB with  LAB   [facility] Lab ([facility])    [address] [address] [phone]           Please do not eat 10-12 hours before your appointment if you are coming in fasting for labs on lipids, cholesterol, or glucose (sugar). This does not apply to pregnant women. Water, hot tea and black coffee (with nothing added) are okay. Do not drink other fluids, diet soda or chew gum.            Mar 30, 2018 11:40 AM CDT   (Arrive by 11:25 AM)   CT CHEST/ABDOMEN/PELVIS W CONTRAST with UCCT2   [facility] CT ([facility])    [address] [address] [phone]           Please bring any scans or X-rays taken at other hospitals, if similar tests were done. Also bring a list of your medicines, including vitamins, minerals and over-the-counter drugs. It is safest to leave personal items at home.  Be sure to tell your doctor:   If you have any allergies.   If there s any chance you are pregnant.   If you are breastfeeding.  You may have contrast for this exam. To prepare:   Do not eat or drink for 2 hours before your exam. If you need to take medicine, you may take it with small sips of water. (We may ask you to take liquid medicine as well.)   The day before your exam, drink extra fluids at least six 8-ounce glasses (unless your doctor tells you to restrict your fluids).   You will be given instructions on how to drink the contrast.  Patients over 70 or patients with diabetes or kidney problems:   If you haven t had a blood test (creatinine test) within the last 30 days, the Cardiologist/Radiologist may require you to get this test prior to your exam.  If you have diabetes:   Continue to take your metformin medication on the day of your exam  Please wear loose clothing, such as a sweat suit or jogging clothes. Avoid snaps, zippers and other metal. We may ask you to undress and put on a hospital gown.  If you have any questions, please call  the Imaging Department where you will have your exam.            Mar 30, 2018  1:00 PM CDT   (Arrive by 12:45 PM)   Neuro Treatment with Rica Bauman PT   [facility] ([facility])    [address] [address]   [phone]            Apr 02, 2018 10:45 AM CDT   (Arrive by 10:30 AM)   Return Visit with Francisco Gilliam MD   [facility] ([facility])    [address] [place] [address]  [address]  [facility] [address]   [phone]            Apr 03, 2018 11:15 AM CDT   (Arrive by 11:00 AM)   Neuro Treatment with Rica Bauman PT   [facility] ([facility])    [address] [place] [address]  4th [facility] [address] [phone]            Apr 10, 2018 11:15 AM CDT   (Arrive by 11:00 AM)   Neuro Treatment with LU [facility] ([facility])    [address] [place] [address] [phone]            Apr 13, 2018  9:00 AM CDT   (Arrive by 8:45 AM)   Neuro Treatment with Rica Bauman PT   [facility] ([facility])    [address] [place] [address] [phone]              Who to contact     Please call your clinic at [phone] to:    Ask questions about your health    Make or cancel appointments    Discuss your medicines    Learn about your test results    Speak to your doctor            Additional Information About Your Visit        [employer] Information     [employer] gives you secure access to your electronic health record. If you see a primary care provider, you can also send messages to your care team and make appointments. If you have questions, please call your primary care clinic.  If you do not have a primary care provider, please call [phone] and they will assist you.      [employer] is an electronic gateway that provides easy, online access to  your medical records. With [employer], you can request a clinic appointment, read your test results, renew a prescription or communicate with your care team.     To access your existing account, please contact your [facility] or call [phone] for assistance.        Care EveryWhere ID     This is your Care EveryWhere ID. This could be used by other organizations to access your [place] medical records  [id number]         Blood Pressure from Last 3 Encounters:   02/12/18 95/51   01/15/18 123/67   12/21/17 105/64    Weight from Last 3 Encounters:   01/15/18 67.8 kg (149 lb 8 oz)   12/21/17 68.2 kg (150 lb 6.4 oz)   12/05/17 68.9 kg (152 lb)              Today, you had the following     No orders found for display       Primary Care Provider Office Phone # Fax #    Roberto Sarmiento [phone]       [address] [place] [address]        Equal Access to Services     LEE CASTILLO : Hadii aad ku hadasho Soomaali, waaxda luqadaha, qaybta kaalmada adeegyada, waxay idiin hayaan traceeeg florecita burden . So [facility] [phone].    ATENCIÓN: Si habla español, tiene a aguirre disposición servicios gratuitos de asistencia lingüística. Llame al [phone].    We comply with applicable federal civil rights laws and [place] laws. We do not discriminate on the basis of race, color, national origin, age, disability, sex, sexual orientation, or gender identity.            Thank you!     Thank you for choosing [facility]  for your care. Our goal is always to provide you with excellent care. Hearing back from our patients is one way we can continue to improve our services. Please take a few minutes to complete the written survey that you may receive in the mail after your visit with us. Thank you!             Your Updated Medication List - Protect others around you: Learn how to safely use, store and throw away your medicines at www.disposemymeds.org.          This  list is accurate as of 3/16/18  1:45 PM.  Always use your most recent med list.                   Brand Name Dispense Instructions for use Diagnosis    aspirin 81 MG tablet      Take 1 tablet by mouth daily.        atorvastatin 40 MG tablet    LIPITOR    100 tablet    Take 1 tablet (40 mg) by mouth daily    Hyperlipidemia, unspecified hyperlipidemia type       bacitracin ointment     28 g    Apply topically 2 times daily APPLY TO LEFT LEG TWO TIMES PER DAY    Left leg cellulitis       Blood Pressure Monitor Kit     1 kit    1 Units daily    Hypertension       CENTRUM SILVER per tablet      Take 1 tablet by mouth daily. AM        ciclopirox 0.77 % cream    LOPROX    90 g    Apply topically 2 times daily To feet and toenails.    Dermatophytosis of nail, Tinea pedis of both feet       cyanocobalamin 1000 MCG tablet    vitamin  B-12    180 tablet    Take 2 tablets (2,000 mcg) by mouth daily    Anemia       doxazosin 2 MG tablet    CARDURA    90 tablet    Take 1/2 tab in the morning and 1/2 tab in the evening    Essential hypertension       fluticasone 50 MCG/ACT spray    FLONASE    3 Package    Spray 2 sprays into both nostrils daily    Unspecified sinusitis (chronic)       ketoconazole 2 % cream    NIZORAL    60 g    Apply topically daily On hold    Tinea corporis       loratadine 10 MG tablet    CLARITIN     Take 10 mg by mouth as needed        metoprolol tartrate 25 MG tablet    LOPRESSOR    180 tablet    Take 1 tablet (25 mg) by mouth 2 times daily    Coronary artery disease involving native heart without angina pectoris, unspecified vessel or lesion type       mirtazapine 15 MG tablet    REMERON     Take 15 mg by mouth At Bedtime.        order for DME     1 Units    20-30 mm Hg knee length compression stockings.  Measure and fit.  Style and color per patient preference.  Doquynh Hsu per patient need.    PVD (peripheral vascular disease) (H), Ulcer of left lower extremity, limited to breakdown of skin (H)        triamcinolone 0.1 % cream    KENALOG    80 g    Apply topically 2 times daily For up to two weeks in a row    Atopic eczema       warfarin 5 MG tablet    COUMADIN    35 tablet    7.5 mg on Wed; 5 mg all other days  or as instructed by coumadin clinic.    Atrial flutter (H)       ZOLOFT 100 MG tablet   Generic drug:  sertraline      Take 100 mg by mouth every evening.

## 2018-03-16 NOTE — PATIENT INSTRUCTIONS
Bridging with weight bearing in heels - 5 sec holds            Straight leg raises  - squeeze knee straight and then lift only to the high of your other thigh          Side planks  Easier version:  - Both knees bent, elbow under shoulder and lift hips for 5 secs, 5+ reps per side          Harder version:  - Same as above, but lift and lower top leg while holding your hips up          Hip extension on your belly  **stop this if back pain**  - Roll over to your stomach  - lift one thigh off of the bed, 5 secs holds, 10x per leg          Laying posture check (as if you were standing against the door)  - Push head back with no pillow under head, shoulders pinch          Seated calf stretch with the belt  - Don't put the belt on your dressing  - 30 secs, 2-3x per leg

## 2018-03-20 ENCOUNTER — THERAPY VISIT (OUTPATIENT)
Dept: OCCUPATIONAL THERAPY | Facility: CLINIC | Age: 83
End: 2018-03-20
Payer: COMMERCIAL

## 2018-03-20 ENCOUNTER — ANTICOAGULATION THERAPY VISIT (OUTPATIENT)
Dept: ANTICOAGULATION | Facility: CLINIC | Age: 83
End: 2018-03-20

## 2018-03-20 DIAGNOSIS — Z78.9 IMPAIRED INSTRUMENTAL ACTIVITIES OF DAILY LIVING (IADL): ICD-10-CM

## 2018-03-20 DIAGNOSIS — R41.3 MEMORY LOSS: Primary | ICD-10-CM

## 2018-03-20 DIAGNOSIS — I48.0 PAROXYSMAL ATRIAL FIBRILLATION (H): ICD-10-CM

## 2018-03-20 DIAGNOSIS — Z79.01 LONG-TERM (CURRENT) USE OF ANTICOAGULANTS: ICD-10-CM

## 2018-03-20 LAB — INR PPP: 2.36 (ref 0.86–1.14)

## 2018-03-20 NOTE — PROGRESS NOTES
ANTICOAGULATION FOLLOW-UP CLINIC VISIT    Patient Name:  Noe Florence  Date:  3/20/2018  Contact Type:  Telephone    SUBJECTIVE:        OBJECTIVE    INR   Date Value Ref Range Status   03/20/2018 2.36 (H) 0.86 - 1.14 Final       ASSESSMENT / PLAN  INR assessment THER    Recheck INR In: 6 WEEKS    INR Location Clinic      Anticoagulation Summary as of 3/20/2018     INR goal 2.0-3.0   Today's INR 2.36   Maintenance plan 5 mg (5 mg x 1) every day   Full instructions 5 mg every day   Weekly total 35 mg   Plan last modified Nguyen Zuniga RN (12/21/2017)   Next INR check 5/1/2018   Priority INR   Target end date Indefinite    Indications   Atrial fibrillation (H) [I48.91] [I48.91]  Long-term (current) use of anticoagulants [Z79.01] [Z79.01]         Anticoagulation Episode Summary     INR check location     Preferred lab     Send INR reminders to [facility]    Comments Patient contact number: [phone]   Can leave results with Rica (friend)      Anticoagulation Care Providers     Provider Role Specialty Phone number    Deedee Baird MD Responsible Cardiology [phone]            See the Encounter Report to view Anticoagulation Flowsheet and Dosing Calendar (Go to Encounters tab in chart review, and find the Anticoagulation Therapy Visit)  Left message for patient with results and dosing recommendations. Asked patient to call back to report any missed doses, falls, signs and symptoms of bleeding or clotting, any changes in health, medication, or diet. Asked patient to call back with any questions or concerns.      Leena Abebe RN

## 2018-03-20 NOTE — MR AVS SNAPSHOT
Noe Florence   3/20/2018   Anticoagulation Therapy Visit    Description:  82 year old male   Provider:  Leena Abebe, RN   Department:  [facility]           INR as of 3/20/2018     Today's INR 2.36      Anticoagulation Summary as of 3/20/2018     INR goal 2.0-3.0   Today's INR 2.36   Full instructions 5 mg every day   Next INR check 5/1/2018    Indications   Atrial fibrillation (H) [I48.91] [I48.91]  Long-term (current) use of anticoagulants [Z79.01] [Z79.01]         March 2018 Details    Sun Mon Tue Wed Thu Fri Sat         1               2               3                 4               5               6               7               8               9               10                 11               12               13               14               15               16               17                 18               19               20      5 mg   See details      21      5 mg         22      5 mg         23      5 mg         24      5 mg           25      5 mg         26      5 mg         27      5 mg         28      5 mg         29      5 mg         30      5 mg         31      5 mg          Date Details   03/20 This INR check               How to take your warfarin dose     To take:  5 mg Take 1 of the 5 mg tablets.           April 2018 Details    Sun Mon Tue Wed Thu Fri Sat     1      5 mg         2      5 mg         3      5 mg         4      5 mg         5      5 mg         6      5 mg         7      5 mg           8      5 mg         9      5 mg         10      5 mg         11      5 mg         12      5 mg         13      5 mg         14      5 mg           15      5 mg         16      5 mg         17      5 mg         18      5 mg         19      5 mg         20      5 mg         21      5 mg           22      5 mg         23      5 mg         24      5 mg         25      5 mg         26      5 mg         27      5 mg         28      5 mg           29      5 mg         30      5  mg               Date Details   No additional details            How to take your warfarin dose     To take:  5 mg Take 1 of the 5 mg tablets.           May 2018 Details    Sun Mon Tue Wed Thu Fri Sat       1            2               3               4               5                 6               7               8               9               10               11               12                 13               14               15               16               17               18               19                 20               21               22               23               24               25               26                 27               28               29               30               31                  Date Details   No additional details    Date of next INR:  5/1/2018         How to take your warfarin dose     To take:  5 mg Take 1 of the 5 mg tablets.

## 2018-03-23 ENCOUNTER — OFFICE VISIT (OUTPATIENT)
Dept: NEUROLOGY | Facility: CLINIC | Age: 83
End: 2018-03-23
Payer: COMMERCIAL

## 2018-03-23 VITALS
BODY MASS INDEX: 22.52 KG/M2 | SYSTOLIC BLOOD PRESSURE: 125 MMHG | HEIGHT: 67 IN | DIASTOLIC BLOOD PRESSURE: 74 MMHG | HEART RATE: 71 BPM | WEIGHT: 143.5 LBS

## 2018-03-23 DIAGNOSIS — R63.4 WEIGHT LOSS: ICD-10-CM

## 2018-03-23 DIAGNOSIS — C18.7 CANCER OF SIGMOID COLON (H): ICD-10-CM

## 2018-03-23 DIAGNOSIS — G60.3 IDIOPATHIC PROGRESSIVE POLYNEUROPATHY: Primary | ICD-10-CM

## 2018-03-23 DIAGNOSIS — R26.9 GAIT DISTURBANCE: ICD-10-CM

## 2018-03-23 DIAGNOSIS — E55.9 VITAMIN D DEFICIENCY: ICD-10-CM

## 2018-03-23 DIAGNOSIS — M21.372 FOOT DROP, LEFT: ICD-10-CM

## 2018-03-23 ASSESSMENT — PAIN SCALES - GENERAL: PAINLEVEL: NO PAIN (0)

## 2018-03-23 NOTE — MR AVS SNAPSHOT
After Visit Summary   3/23/2018    Noe Florence    MRN: [id number]           Patient Information     Date Of Birth          1935        Visit Information        Provider Department      3/23/2018 7:30 AM Clemente Garcia MD [facility] Neurology        Today's Diagnoses     Idiopathic progressive polyneuropathy    -  1    Foot drop, left        Gait disturbance        Vitamin D deficiency        Weight loss        Cancer of sigmoid colon (H)           Follow-ups after your visit        Additional Services     NEUROLOGY ADULT REFERRAL       Your provider has referred you for the following:   Consult at [facility]: Neurology Clinic [facility] [phone]   [email or web address]  [facility] or [place]     Please be aware that coverage of these services is subject to the terms and limitations of your health insurance plan.  Call member services at your health plan with any benefit or coverage questions.      Please bring the following with you to your appointment:    (1) Any X-Rays, CTs or MRIs which have been performed.  Contact the facility where they were done to arrange for  prior to your scheduled appointment.    (2) List of current medications  (3) This referral request   (4) Any documents/labs given to you for this referral                  Your next 10 appointments already scheduled     Mar 27, 2018 10:15 AM CDT   LAB with [facility] ([facility])    [address] [place] [address] [phone]           Please do not eat 10-12 hours before your appointment if you are coming in fasting for labs on lipids, cholesterol, or glucose (sugar). This does not apply to pregnant women. Water, hot tea and black coffee (with nothing added) are okay. Do not drink other fluids, diet soda or chew gum.            Mar 27, 2018 11:15 AM CDT   (Arrive by 11:00 AM)   Neuro Treatment with  Rica Bauman, PT   [facility] ([facility])    [address]  4th Floor  [facility] [address] [phone]            Mar 27, 2018  2:30 PM CDT   New Neuropathy with Jase Duarte MD   [facility] ([facility])    [address]  [address]  [facility] [address]   [phone]            Mar 30, 2018 11:15 AM CDT   LAB with  LAB   [facility] ([facility])    [address]  1st Floor  [facility] [address] [phone]           Please do not eat 10-12 hours before your appointment if you are coming in fasting for labs on lipids, cholesterol, or glucose (sugar). This does not apply to pregnant women. Water, hot tea and black coffee (with nothing added) are okay. Do not drink other fluids, diet soda or chew gum.            Mar 30, 2018 11:40 AM CDT   (Arrive by 11:25 AM)   CT CHEST/ABDOMEN/PELVIS W CONTRAST with UCCT2   [facility] [place] [facility] ([facility])    [address]  [address]st [facility] [address] [phone]           Please bring any scans or X-rays taken at other hospitals, if similar tests were done. Also bring a list of your medicines, including vitamins, minerals and over-the-counter drugs. It is safest to leave personal items at home.  Be sure to tell your doctor:   If you have any allergies.   If there s any chance you are pregnant.   If you are breastfeeding.  You may have contrast for this exam. To prepare:   Do not eat or drink for 2 hours before your exam. If you need to take medicine, you may take it with small sips of water. (We may ask you to take liquid medicine as well.)   The day before your exam, drink extra fluids at least six 8-ounce glasses (unless your doctor tells you to restrict your fluids).   You will be given instructions on how to drink the contrast.  Patients over 70 or patients with diabetes or kidney problems:   If you  haven t had a blood test (creatinine test) within the last 30 days, the Cardiologist/Radiologist may require you to get this test prior to your exam.  If you have diabetes:   Continue to take your metformin medication on the day of your exam  Please wear loose clothing, such as a sweat suit or jogging clothes. Avoid snaps, zippers and other metal. We may ask you to undress and put on a hospital gown.  If you have any questions, please call the Imaging Department where you will have your exam.            Mar 30, 2018  1:00 PM CDT   (Arrive by 12:45 PM)   Neuro Treatment with Rica Bauman PT   [facility] ([facility])    [address]  4th [facility] [address]   [phone]            Apr 02, 2018 10:45 AM CDT   (Arrive by 10:30 AM)   Return Visit with Francisco Gilliam MD   [facility] ([facility])    [address]  [address] [place] [address]   [phone]            Apr 03, 2018 11:15 AM CDT   (Arrive by 11:00 AM)   Neuro Treatment with Rica Bauman PT   [facility] ([facility])    [address]  [address] [facility] [address]   [phone]            Apr 10, 2018 11:15 AM CDT   (Arrive by 11:00 AM)   Neuro Treatment with Rica Bauman PT   [facility] ([facility])    [address]  [address] [facility] [address]   [phone]            Apr 13, 2018  9:00 AM CDT   (Arrive by 8:45 AM)   Neuro Treatment with Rica Bauman PT   [facility] ([facility])    [address] [address]   [phone]              Future tests that were ordered for you today     Open Future Orders        Priority Expected Expires Ordered    Paraneoplastic antibody Routine 3/28/2018 3/23/2019 3/23/2018    EMG - Needle EMG 2 Extremities (20265) Routine  3/23/2019 3/23/2018     "Protein immunofixation urine Routine 3/28/2018 3/23/2019 3/23/2018    CK total Routine  3/23/2019 3/23/2018    Vitamin D Deficiency Routine 3/28/2018 3/23/2019 3/23/2018    TSH with free T4 reflex Routine  3/23/2019 3/23/2018            Who to contact     Please call your clinic at [phone] to:    Ask questions about your health    Make or cancel appointments    Discuss your medicines    Learn about your test results    Speak to your doctor            Additional Information About Your Visit        [employer] Information     [employer] gives you secure access to your electronic health record. If you see a primary care provider, you can also send messages to your care team and make appointments. If you have questions, please call your primary care clinic.  If you do not have a primary care provider, please call [phone] and they will assist you.      [employer] is an electronic gateway that provides easy, online access to your medical records. With [employer], you can request a clinic appointment, read your test results, renew a prescription or communicate with your care team.     To access your existing account, please contact your [facility] or call [phone] for assistance.        Care EveryWhere ID     This is your Care EveryWhere ID. This could be used by other organizations to access your [facility] medical records  [id number]        Your Vitals Were     Pulse Height BMI (Body Mass Index)             71 1.702 m (5' 7\") 22.48 kg/m2          Blood Pressure from Last 3 Encounters:   03/23/18 125/74   02/12/18 95/51   01/15/18 123/67    Weight from Last 3 Encounters:   03/23/18 65.1 kg (143 lb 8 oz)   01/15/18 67.8 kg (149 lb 8 oz)   12/21/17 68.2 kg (150 lb 6.4 oz)              We Performed the Following     NEUROLOGY ADULT REFERRAL        Primary Care Provider Office Phone # Fax #    Roberto Sarmiento [phone]       [address] [place] [address] " [phone]        Equal Access to Services     [facility]: Hadii giovanny olsen betzaida Bains, waaxda luqadaha, qaybta kaalmada traceekimvalerie, waxsophy sera wilcoxdafnemc minor. So [facility] [phone].    ATENCIÓN: Si habla español, tiene a aguirre disposición servicios gratuitos de asistencia lingüística. Llame al [phone].    We comply with applicable federal civil rights laws and [place] laws. We do not discriminate on the basis of race, color, national origin, age, disability, sex, sexual orientation, or gender identity.            Thank you!     Thank you for choosing [facility] NEUROLOGY  for your care. Our goal is always to provide you with excellent care. Hearing back from our patients is one way we can continue to improve our services. Please take a few minutes to complete the written survey that you may receive in the mail after your visit with us. Thank you!             Your Updated Medication List - Protect others around you: Learn how to safely use, store and throw away your medicines at www.disposemymeds.org.          This list is accurate as of 3/23/18  9:02 AM.  Always use your most recent med list.                   Brand Name Dispense Instructions for use Diagnosis    aspirin 81 MG tablet      Take 1 tablet by mouth daily.        atorvastatin 40 MG tablet    LIPITOR    100 tablet    Take 1 tablet (40 mg) by mouth daily    Hyperlipidemia, unspecified hyperlipidemia type       bacitracin ointment     28 g    Apply topically 2 times daily APPLY TO LEFT LEG TWO TIMES PER DAY    Left leg cellulitis       Blood Pressure Monitor Kit     1 kit    1 Units daily    Hypertension       CENTRUM SILVER per tablet      Take 1 tablet by mouth daily. AM        ciclopirox 0.77 % cream    LOPROX    90 g    Apply topically 2 times daily To feet and toenails.    Dermatophytosis of nail, Tinea pedis of both feet       cyanocobalamin 1000 MCG tablet    vitamin  B-12    180 tablet    Take 2 tablets (2,000 mcg) by mouth daily     Anemia       doxazosin 2 MG tablet    CARDURA    90 tablet    Take 1/2 tab in the morning and 1/2 tab in the evening    Essential hypertension       fluticasone 50 MCG/ACT spray    FLONASE    3 Package    Spray 2 sprays into both nostrils daily    Unspecified sinusitis (chronic)       ketoconazole 2 % cream    NIZORAL    60 g    Apply topically daily On hold    Tinea corporis       loratadine 10 MG tablet    CLARITIN     Take 10 mg by mouth as needed        metoprolol tartrate 25 MG tablet    LOPRESSOR    180 tablet    Take 1 tablet (25 mg) by mouth 2 times daily    Coronary artery disease involving native heart without angina pectoris, unspecified vessel or lesion type       mirtazapine 15 MG tablet    REMERON     Take 15 mg by mouth At Bedtime.        order for DME     1 Units    20-30 mm Hg knee length compression stockings.  Measure and fit.  Style and color per patient preference.  Doff n Aracelis per patient need.    PVD (peripheral vascular disease) (H), Ulcer of left lower extremity, limited to breakdown of skin (H)       triamcinolone 0.1 % cream    KENALOG    80 g    Apply topically 2 times daily For up to two weeks in a row    Atopic eczema       warfarin 5 MG tablet    COUMADIN    35 tablet    7.5 mg on Wed; 5 mg all other days  or as instructed by coumadin clinic.    Atrial flutter (H)       ZOLOFT 100 MG tablet   Generic drug:  sertraline      Take 100 mg by mouth every evening.

## 2018-03-23 NOTE — LETTER
3/23/2018       RE: Noe Florence  [address] [facility] [address]     Dear Colleague,    Thank you for referring your patient, Noe Florence, to the [facility] NEUROLOGY at [facility]. Please see a copy of my visit note below.    Service Date: 2018        RE: Noe Florence   MRN: [id number]   : 1935      Dear Dr. Sarmiento:      Thank you for referring Noe Florence for neurologic consultation on 2018.  The patient comes with chief complaints of imbalance, left foot drop, as well as paresthesias.  His other chief complaints include fatigue as well as weight loss.      The patient was accompanied by his partner, Rica Abner.  The patient is a good historian.  He has had progressive problems with balance the last 3 years.  He did fall in the last year 4-6 times.  Fortunately, he suffered no injuries.  He has not fallen in the last few months.  He did note that he was able to play tennis but he stopped about 1-1/2 to 2 years ago in part because of this fear of not feeling secure on the court.  He has had a chronic left foot drop.  He has been getting therapy now from Rica Bauman.  He was given a brace for the left foot which sounds like an ankle orthosis but he stopped using it because it could cause ulcer.  He has probably not had any new tingling or numbness, but later he did indicate he may have some sensory disturbance involving his feet.  He has been able to control his stool but he has had trouble with chronic urinary incontinence and he tends to drip after he is done urinating.  He does note that he probably still sweats.  He still can have an erection.  He is not weak in his other 3 limbs.  He did note he does tend to cross his legs.  The patient has not had any joint pain or myalgias.  He denied spine discomfort.  There has been no Lhermitte sign.  He has not been short of breath.  The patient has had no trouble swallowing nor has he  had difficulties with chewing.  There is no history of diplopia or ptosis.  He has not noticed any fasciculations.  He has a normal diet including meat, vegetables and fruit.  He is not taking any supplements and specifically has not taken zinc or B6.  He did tell me he has normal stools, although occasionally it is loose.  He did not feel he had any significant memory problems.  He has been losing weight.  He does tend to stay up at night and reads or watches TV.  He sleeps into the morning but generally does not feel tired.  He does though have some fatigue issues.      The patient has had a number of studies done.  He did have a small monoclonal protein identified on 01/15/2018 that was IgM, I believe.  His CRP was less than 2.9. His sed rate was 43.  His hemoglobin was 10.7 and his MCV was 97.  His vitamin D level was 46 on 01/25/2017.  On 11/05/2017 he had normal metabolic profile except for chloride of 113 and a calcium of 8.2.  On 09/26/2017 he normal comprehensive metabolic profile including liver function tests.  He had on 01/15/2018 B12 level of 2106 picograms and an MMA of 0.35.  His magnesium level on 11/16/2016 was 2.2 and his TSH 0.94 and his CPK was 114.      The patient has had prior DVT of the left leg.  You have recorded that he has had an unintentional weight loss of 15 pounds in the last 4 months.      CURRENT MEDICATIONS:   The patient's current medications include:   1.  Coumadin.     2.  Oral B12.     3.  Cardura.     4.  Lipitor.     5.  Metoprolol.     6.  Claritin.     7.  Remeron 15 mg at bedtime.     8.  Sertraline 100 mg every evening.     9.  Flonase.     10.  Various creams.      The patient did have bypass surgery done in 2006.  In 1994, he had further cardiac surgery with bypass in 2006.  He has had cataract extractions, ablation for atrial flutter, defibrillator placed, bilateral knee surgeries, repair of mitral valve in 2006 as well as bilateral laser trabeculoplasty and he has had  right shoulder rotator cuff surgery.      ALLERGIES:   The patient has allergies to latex.      There is no family history to suggest neuromuscular problems.  His father had coronary artery disease and  at 80 of old age.  His mother  of old age at 89.  He had no other relevant family history.  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      The patient did have further records that indicate he had on 2014 a colectomy for cancer.  He did not receive any radiation treatment nor did he have chemotherapy.      The patient did tell me that with his left knee replacement he did not have good results and cannot totally straighten the leg, but does not have pain.      Other diagnoses include open angle glaucoma, ischemic cardiomyopathy, polymorphic ventricular tachycardia, previous diagnosis of polymyalgia rheumatica, with no current symptoms to suggest that problem.  The patient did note that he had been diagnosed with neuropathy in the past.  He was told that he had developed calluses with hammertoes over 20 years ago.  He said some of this he thought related to jamming his toes playing tennis.  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      The patient's echocardiogram on 2017 showed that he had an EF fraction of 55%-60%.  He had mild to moderate right ventricular dilatation.  He had severe bilateral atrial enlargement and this study had been compared with no change from 12/15/2015.      The patient lost his wife and has lived with his partner now for a number of years.  He quit smoking in .  He has used socially alcohol but not to a major extent.      Neurologic examination revealed a pleasant man.  He appeared his stated age.  He did not have any fasciculations.  When he attempted to lift his right arm where he had had rotator cuff surgery he did have prominent scapular winging.  He did also walk with a fairly severe left steppage gait with foot drop.  He had trouble  with tandem, which would not be unusual at his age.  He had some postural instability on turns and did have a mildly wide-based gait.  He had negative Romberg.  Muscle stretch reflexes were present but reduced at the right triceps and absent at both brachioradialis reflexes as well as ankle jerks.  His toes were downward going to plantar stimulation.  Strength testing showed that he had only antigravity movement of the left anterior tibialis muscle and no movement of the left toe extensors.  He had mild weakness of the left peroneus longus muscle and possibly the left posterior tibial muscle.  The patient could not fully extend the right arm and some of this could have related to the previous surgery or to muscular instability related to the scapular winging.  Otherwise, he had decreased sensation to pinprick to both ankles and to the wrists in a somewhat variable manner.  Vibratory and position sense testing was intact.  I could not auscultate cervical bruits.  He had a regular cardiac rhythm.  His lungs were clear to auscultation.      IMPRESSION:   1.  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   2.  Chronic left foot drop.   3.  Right scapular winging.      I suspect the patient has chronic underlying polyneuropathy.  I cannot tell he does not have a mononeuritis multiplex superimposed.  I have recommended that he have EMG testing and to be seen by our neuromuscular expert, Dr. Duarte.  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  The patient does need to continue physical therapy.  I have cautioned him about crossing his legs.  I told him I would be happy to see him again but I suspect that his care will now be under Dr. Duatre.  I have added further blood tests to be done including paraneoplastic panel and 24-hour urine for electrophoresis for monoclonal gammopathy.       I spent 1 hour with the patient.  Over 50% of the time this involved counseling and coordination of care.  A complete  review of medical systems was done and a positive review is listed in the report above.         D: 2018   T: 2018   MT: BONNY      Name:     LEROY MACHADO   MRN:      [id number]        Account:      [id number]   :      1935           Service Date: 2018      Document: [id number]        Again, thank you for allowing me to participate in the care of your patient.      Sincerely,    Clemente Garcia MD    CC:  Roberto Sarmiento MD   [address] [address]

## 2018-03-27 ENCOUNTER — ANTICOAGULATION THERAPY VISIT (OUTPATIENT)
Dept: ANTICOAGULATION | Facility: CLINIC | Age: 83
End: 2018-03-27

## 2018-03-27 ENCOUNTER — THERAPY VISIT (OUTPATIENT)
Dept: PHYSICAL THERAPY | Facility: CLINIC | Age: 83
End: 2018-03-27
Payer: COMMERCIAL

## 2018-03-27 ENCOUNTER — OFFICE VISIT (OUTPATIENT)
Dept: NEUROLOGY | Facility: CLINIC | Age: 83
End: 2018-03-27
Payer: COMMERCIAL

## 2018-03-27 VITALS
WEIGHT: 147.2 LBS | SYSTOLIC BLOOD PRESSURE: 124 MMHG | HEART RATE: 72 BPM | BODY MASS INDEX: 23.1 KG/M2 | DIASTOLIC BLOOD PRESSURE: 57 MMHG | HEIGHT: 67 IN | TEMPERATURE: 98.9 F

## 2018-03-27 DIAGNOSIS — I48.0 PAROXYSMAL ATRIAL FIBRILLATION (H): ICD-10-CM

## 2018-03-27 DIAGNOSIS — Z91.81 AT HIGH RISK FOR FALLS: ICD-10-CM

## 2018-03-27 DIAGNOSIS — E55.9 VITAMIN D DEFICIENCY: ICD-10-CM

## 2018-03-27 DIAGNOSIS — G60.3 IDIOPATHIC PROGRESSIVE POLYNEUROPATHY: Primary | ICD-10-CM

## 2018-03-27 DIAGNOSIS — G60.3 IDIOPATHIC PROGRESSIVE POLYNEUROPATHY: ICD-10-CM

## 2018-03-27 DIAGNOSIS — R63.4 WEIGHT LOSS: ICD-10-CM

## 2018-03-27 DIAGNOSIS — R26.9 GAIT DISTURBANCE: ICD-10-CM

## 2018-03-27 DIAGNOSIS — M21.372 LEFT FOOT DROP: ICD-10-CM

## 2018-03-27 DIAGNOSIS — G63 NEUROPATHY ASSOCIATED WITH MGUS (H): ICD-10-CM

## 2018-03-27 DIAGNOSIS — R26.81 GAIT INSTABILITY: ICD-10-CM

## 2018-03-27 DIAGNOSIS — Z79.01 LONG-TERM (CURRENT) USE OF ANTICOAGULANTS: ICD-10-CM

## 2018-03-27 DIAGNOSIS — M62.81 GENERALIZED MUSCLE WEAKNESS: Primary | ICD-10-CM

## 2018-03-27 DIAGNOSIS — C18.7 CANCER OF SIGMOID COLON (H): ICD-10-CM

## 2018-03-27 DIAGNOSIS — D47.2 NEUROPATHY ASSOCIATED WITH MGUS (H): ICD-10-CM

## 2018-03-27 DIAGNOSIS — M40.00 ACQUIRED POSTURAL KYPHOSIS: ICD-10-CM

## 2018-03-27 DIAGNOSIS — M21.372 FOOT DROP, LEFT: ICD-10-CM

## 2018-03-27 DIAGNOSIS — R26.89 MULTIFACTORIAL GAIT DISORDER: ICD-10-CM

## 2018-03-27 LAB
CK SERPL-CCNC: 43 U/L (ref 30–300)
DEPRECATED CALCIDIOL+CALCIFEROL SERPL-MC: 52 UG/L (ref 20–75)
INR PPP: 1.93 (ref 0.86–1.14)
TSH SERPL DL<=0.005 MIU/L-ACNC: 0.67 MU/L (ref 0.4–4)

## 2018-03-27 ASSESSMENT — PAIN SCALES - GENERAL: PAINLEVEL: NO PAIN (0)

## 2018-03-27 NOTE — MR AVS SNAPSHOT
After Visit Summary   3/27/2018    Noe Florence    MRN: [id number]           Patient Information     Date Of Birth          1935        Visit Information        Provider Department      3/27/2018 2:30 PM Jase Duarte MD [facility] NEUROSPECIALTIES        Today's Diagnoses     Idiopathic progressive polyneuropathy    -  1    Neuropathy associated with MGUS (H)        Multifactorial gait disorder           Follow-ups after your visit        Follow-up notes from your care team     Return in about 3 months (around 6/27/2018).      Your next 10 appointments already scheduled     Mar 30, 2018 11:15 AM CDT   LAB with  LAB   [facility] ([facility])    [address] [place] [id number] [phone]           Please do not eat 10-12 hours before your appointment if you are coming in fasting for labs on lipids, cholesterol, or glucose (sugar). This does not apply to pregnant women. Water, hot tea and black coffee (with nothing added) are okay. Do not drink other fluids, diet soda or chew gum.            Mar 30, 2018 11:40 AM CDT   (Arrive by 11:25 AM)   CT CHEST/ABDOMEN/PELVIS W CONTRAST with UCCT2   [facility] [place] [facility] ([facility])    [address] [place] [address] [id number] [phone]           Please bring any scans or X-rays taken at other hospitals, if similar tests were done. Also bring a list of your medicines, including vitamins, minerals and over-the-counter drugs. It is safest to leave personal items at home.  Be sure to tell your doctor:   If you have any allergies.   If there s any chance you are pregnant.   If you are breastfeeding.  You may have contrast for this exam. To prepare:   Do not eat or drink for 2 hours before your exam. If you need to take medicine, you may take it with small sips of water. (We may ask you to take liquid medicine as well.)   The day before your exam, drink  extra fluids at least six 8-ounce glasses (unless your doctor tells you to restrict your fluids).   You will be given instructions on how to drink the contrast.  Patients over 70 or patients with diabetes or kidney problems:   If you haven t had a blood test (creatinine test) within the last 30 days, the Cardiologist/Radiologist may require you to get this test prior to your exam.  If you have diabetes:   Continue to take your metformin medication on the day of your exam  Please wear loose clothing, such as a sweat suit or jogging clothes. Avoid snaps, zippers and other metal. We may ask you to undress and put on a hospital gown.  If you have any questions, please call the Imaging Department where you will have your exam.            Mar 30, 2018  1:00 PM CDT   (Arrive by 12:45 PM)   Neuro Treatment with Rica Bauman PT   [facility] ([facility])    [address] [address]   [phone]            Apr 02, 2018 10:45 AM CDT   (Arrive by 10:30 AM)   Return Visit with Francisco Gilliam MD   [facility] ([facility])    [address] [facility]  [address]  [facility] [address]   [phone]            Apr 03, 2018 11:15 AM CDT   (Arrive by 11:00 AM)   Neuro Treatment with Rica Bauman PT   [facility] ([facility])    [address] [facility]  [address] [facility] [address]   [phone]            Apr 03, 2018  1:00 PM CDT   Treatment 60 with Courtney Meehan OT   [facility] ([facility])    [address] [facility]  [address] [facility] [address]   [phone]            Apr 10, 2018 11:00 AM CDT   EMG with Jase Duarte MD   [facility] NEUROSPECIALTIES ([facility])    [address] [facility] [facility]  [address]  [facility] [id number] [phone]            Apr 10, 2018 11:15 AM CDT   (Arrive by 11:00 AM)   Neuro Treatment with  [facility] ([facility])    [address] [facility]  [address] [facility] [address]   [phone]            Apr 13, 2018  9:00 AM CDT   (Arrive by 8:45 AM)   Neuro Treatment with [facility] ([facility])    [address] [facility]  [address] [facility] [address]   [phone]            Apr 17, 2018 11:15 AM CDT   (Arrive by 11:00 AM)   Neuro Treatment with [facility] ([facility])    [address] [facility]  [address] [facility] [address]   [phone]              Future tests that were ordered for you today     Open Future Orders        Priority Expected Expires Ordered    EMG Routine  3/27/2019 3/27/2018            Who to contact     Please call your clinic at [phone] to:    Ask questions about your health    Make or cancel appointments    Discuss your medicines    Learn about your test results    Speak to your doctor            Additional Information About Your Visit        [employer] Information     [employer] gives you secure access to your electronic health record. If you see a primary care provider, you can also send messages to your care team and make appointments. If you have questions, please call your primary care clinic.  If you do not have a primary care provider, please call [phone] and they will assist you.      [employer] is an electronic gateway that provides easy, online access to your medical records. With [employer], you can request a clinic appointment, read your test results, renew a prescription or communicate with your care team.     To access your existing account, please contact your [facility] or call [phone] for assistance.        Care EveryWhere ID     This is your Care EveryWhere ID. This could be used by other organizations to access your [place] medical records  [id number]        Your Vitals  "Were     Pulse Temperature Height BMI (Body Mass Index)          72 98.9  F (37.2  C) (Temporal) 5' 7\" (170.2 cm) 23.05 kg/m2         Blood Pressure from Last 3 Encounters:   03/27/18 124/57   03/23/18 125/74   02/12/18 95/51    Weight from Last 3 Encounters:   03/27/18 147 lb 3.2 oz (66.8 kg)   03/23/18 143 lb 8 oz (65.1 kg)   01/15/18 149 lb 8 oz (67.8 kg)              We Performed the Following     MAG and SGPG Antibodies IgM        Primary Care Provider Office Phone # Fax #    Roberto Sarmiento [phone]       [address] [place] [address]        Equal Access to Services     LEE CASTILLO : Delilah huston Solali, waaxda luqadaha, qaybta kaalmada adeegyada, sil burden . So [facility] [phone].    ATENCIÓN: Si habla español, tiene a aguirre disposición servicios gratuitos de asistencia lingüística. Llame al [phone].    We comply with applicable federal civil rights laws and [place] laws. We do not discriminate on the basis of race, color, national origin, age, disability, sex, sexual orientation, or gender identity.            Thank you!     Thank you for choosing [facility] NEUROSPECIALTIES  for your care. Our goal is always to provide you with excellent care. Hearing back from our patients is one way we can continue to improve our services. Please take a few minutes to complete the written survey that you may receive in the mail after your visit with us. Thank you!             Your Updated Medication List - Protect others around you: Learn how to safely use, store and throw away your medicines at www.disposemymeds.org.          This list is accurate as of 3/27/18  3:28 PM.  Always use your most recent med list.                   Brand Name Dispense Instructions for use Diagnosis    aspirin 81 MG tablet      Take 1 tablet by mouth daily.        atorvastatin 40 MG tablet    LIPITOR    100 tablet    Take 1 tablet (40 mg) by mouth daily    " Hyperlipidemia, unspecified hyperlipidemia type       bacitracin ointment     28 g    Apply topically 2 times daily APPLY TO LEFT LEG TWO TIMES PER DAY    Left leg cellulitis       Blood Pressure Monitor Kit     1 kit    1 Units daily    Hypertension       CENTRUM SILVER per tablet      Take 1 tablet by mouth daily. AM        ciclopirox 0.77 % cream    LOPROX    90 g    Apply topically 2 times daily To feet and toenails.    Dermatophytosis of nail, Tinea pedis of both feet       cyanocobalamin 1000 MCG tablet    vitamin  B-12    180 tablet    Take 2 tablets (2,000 mcg) by mouth daily    Anemia       doxazosin 2 MG tablet    CARDURA    90 tablet    Take 1/2 tab in the morning and 1/2 tab in the evening    Essential hypertension       fluticasone 50 MCG/ACT spray    FLONASE    3 Package    Spray 2 sprays into both nostrils daily    Unspecified sinusitis (chronic)       ketoconazole 2 % cream    NIZORAL    60 g    Apply topically daily On hold    Tinea corporis       loratadine 10 MG tablet    CLARITIN     Take 10 mg by mouth as needed        metoprolol tartrate 25 MG tablet    LOPRESSOR    180 tablet    Take 1 tablet (25 mg) by mouth 2 times daily    Coronary artery disease involving native heart without angina pectoris, unspecified vessel or lesion type       mirtazapine 15 MG tablet    REMERON     Take 15 mg by mouth At Bedtime.        order for DME     1 Units    20-30 mm Hg knee length compression stockings.  Measure and fit.  Style and color per patient preference.  Doff n Aracelis per patient need.    PVD (peripheral vascular disease) (H), Ulcer of left lower extremity, limited to breakdown of skin (H)       triamcinolone 0.1 % cream    KENALOG    80 g    Apply topically 2 times daily For up to two weeks in a row    Atopic eczema       warfarin 5 MG tablet    COUMADIN    35 tablet    7.5 mg on Wed; 5 mg all other days  or as instructed by coumadin clinic.    Atrial flutter (H)       ZOLOFT 100 MG tablet   Generic  drug:  sertraline      Take 100 mg by mouth every evening.

## 2018-03-27 NOTE — MR AVS SNAPSHOT
Noe Florence   3/27/2018   Anticoagulation Therapy Visit    Description:  82 year old male   Provider:  Leena Abebe RN   Department:  [facility]           INR as of 3/27/2018     Today's INR 1.93!      Anticoagulation Summary as of 3/27/2018     INR goal 2.0-3.0   Today's INR 1.93!   Full instructions 3/27: 7.5 mg; Otherwise 5 mg every day   Next INR check 5/8/2018    Indications   Atrial fibrillation (H) [I48.91] [I48.91]  Long-term (current) use of anticoagulants [Z79.01] [Z79.01]         March 2018 Details    Sun Mon Tue Wed Thu Fri Sat         1               2               3                 4               5               6               7               8               9               10                 11               12               13               14               15               16               17                 18               19               20               21               22               23               24                 25               26               27      7.5 mg   See details      28      5 mg         29      5 mg         30      5 mg         31      5 mg          Date Details   03/27 This INR check               How to take your warfarin dose     To take:  5 mg Take 1 of the 5 mg tablets.    To take:  7.5 mg Take 1.5 of the 5 mg tablets.           April 2018 Details    Sun Mon Tue Wed Thu Fri Sat     1      5 mg         2      5 mg         3      5 mg         4      5 mg         5      5 mg         6      5 mg         7      5 mg           8      5 mg         9      5 mg         10      5 mg         11      5 mg         12      5 mg         13      5 mg         14      5 mg           15      5 mg         16      5 mg         17      5 mg         18      5 mg         19      5 mg         20      5 mg         21      5 mg           22      5 mg         23      5 mg         24      5 mg         25      5 mg         26      5 mg         27      5 mg         28       5 mg           29      5 mg         30      5 mg               Date Details   No additional details            How to take your warfarin dose     To take:  5 mg Take 1 of the 5 mg tablets.           May 2018 Details    Sun Mon Tue Wed Thu Fri Sat       1      5 mg         2      5 mg         3      5 mg         4      5 mg         5      5 mg           6      5 mg         7      5 mg         8            9               10               11               12                 13               14               15               16               17               18               19                 20               21               22               23               24               25               26                 27               28               29               30               31                  Date Details   No additional details    Date of next INR:  5/8/2018         How to take your warfarin dose     To take:  5 mg Take 1 of the 5 mg tablets.

## 2018-03-27 NOTE — LETTER
3/27/2018       RE: Noe Florence  [address] [facility] [address]     Dear Colleague,    Thank you for referring your patient, Noe Florence, to the [facility] NEUROSPECIALTIES at [facility]. Please see a copy of my visit note below.    Chief Complaint: foot drop and poor balance    History of Present Illness:    Noe Florence is an 82 year old man who I am seeing for evaluation of leg weakness. He was kindly referred from Nicolette Bauman, physical therapy. Noe has had a left foot drop for many years. He is not sure when this started. He does not feel his foot is getting weaker - the strength in the foot seems to be stable. I reviewed NCS/EMG that were performed in 2014. Those studies showed what was most certainly a left peroneal neuropathy. Although it could not be localized with certainty, the lesion probably came from at or near the knee. There also were some findings on the study to suggest a superimposed polyradicular process involving L4-S1, but the peroneal neuropathy was far out of proportion to the radicular findings. He denies numbness in either foot. He has some calluses in his feet which are uncomfortable, but otherwise no burning pain. One of his main concerns is poor balance. He walks with a cane since December. He has recently started to use a left AFO. This was helpful but he developed an ulcer from the AFO. He denies numbness or weakness in his hands. No tremor. No bowel or bladder changes. No back pain.    He denies any changes in his behavior, personality or memory, although does endorse some increased distractability of late. I do see a comment from PT that maintaining attention has been problematic. His history is also notable for colon cancer, s/p resection about 4 years ago. He never received chemotherapy.     I understand he saw Dr. Garcia just a few days ago but do not have access to any of those notes.     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    Prior electrophysiologic work-up:  6/14: Nerve conduction studies/EMG performed at [facility] showed a left common peroneal neuropathy vs L5 radiculopathy. 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      Past Medical History:   Past Medical History:   Diagnosis Date     Advanced open-angle glaucoma      Antiplatelet or antithrombotic long-term use      Atrial fibrillation (H)      CKD (chronic kidney disease), stage III 2005     Colon cancer (H)     Stage II-B colon cancer     Coronary artery disease     s/p CABG x 2, JEREMY x 2     Diverticulitis      Hyperlipidemia      Hypertension      Ischemic cardiomyopathy      MGUS (monoclonal gammopathy of unknown significance)      Nonsenile cataract     BE     Pacemaker      Polymorphic ventricular tachycardia (H)      Polymyalgia rheumatica (H)      PVD (posterior vitreous detachment), both eyes 2005     S/P ablation of atrial flutter 6/20/14    CTI     Stented coronary artery      SVT (supraventricular tachycardia) (H)     PPM/AICD for NSVT     Upper leg DVT (deep venous thromboembolism), chronic (H)     Left     Weight loss, unintentional 2017    15 lb in 4 months     Past Surgical History:  Past Surgical History:   Procedure Laterality Date     C CABG, ARTERY-VEIN, FOUR  2006    LIMA-LAD, SVG-Rt PDA, SVG-OM2, SVG-Diag 1     C CABG, VEIN, SINGLE  1994    1-vessel CABG, SVG->PDRCA      CATARACT IOL, RT/LT Bilateral      COLONOSCOPY  3/13/2014    Procedure: COMBINED COLONOSCOPY, SINGLE BIOPSY/POLYPECTOMY BY BIOPSY;;  Surgeon: Mary Gerber MD;  Location:  GI     COLONOSCOPY N/A 6/22/2015    Procedure: COLONOSCOPY;  Surgeon: Marilin Newman MD;   Location: [facility] GI     H ABLATION ATRIAL FLUTTER       HEART CATH DRUG ELUTING STENT PLACEMENT  4/19/2012    JEREMY x 2 to LCx     IMPLANT IMPLANTABLE CARDIOVERTER DEFIBRILLATOR  5-    ppm/aicd     KNEE SURGERY      right and left knee surgeries     LAPAROSCOPIC ASSISTED COLECTOMY LEFT (DESCENDING)  4/8/2014    Procedure: LAPAROSCOPIC ASSISTED COLECTOMY LEFT (DESCENDING);  Hand assisted Laparoscopic Sigmoid Colectomy , Laparoscopic mobilization of spleenic flexure *Latex Allergy*Anesthesia General with Block;  Surgeon: Chanelle Guzmán MD;  Location: [facility] OR     REPAIR VALVE MITRAL  2006     30-mm Medtronic Julian ring      SELECTIVE LASER TRABECULOPLASTY (SLT) OD (RIGHT EYE)  4/10, 1/12,+1/9/13    RE     SELECTIVE LASER TRABECULOPLASTY (SLT) OS (LEFT EYE)  5/2012     SHOULDER SURGERY      right rotator cuff     Family history:    There is no known family history of hereditary neuropathies or other neuromuscular disorders.    Social History:    He denies tobacco, alcohol, or illicit drug use.     Medical Allergies:     Allergies   Allergen Reactions     Latex Rash     Rash     Current Medications:   Current Outpatient Prescriptions   Medication     atorvastatin (LIPITOR) 40 MG tablet     doxazosin (CARDURA) 2 MG tablet     metoprolol tartrate (LOPRESSOR) 25 MG tablet     order for DME     warfarin (COUMADIN) 5 MG tablet     Blood Pressure Monitor KIT     triamcinolone (KENALOG) 0.1 % cream     ketoconazole (NIZORAL) 2 % cream     loratadine (CLARITIN) 10 MG tablet     Multiple Vitamins-Minerals (CENTRUM SILVER) per tablet     aspirin 81 MG tablet     mirtazapine (REMERON) 15 MG tablet     sertraline (ZOLOFT) 100 MG tablet     cyanocobalamin (VITAMIN  B-12) 1000 MCG tablet     bacitracin ointment     ciclopirox (LOPROX) 0.77 % cream     fluticasone (FLONASE) 50 MCG/ACT nasal spray     No current facility-administered medications for this visit.      Facility-Administered Medications Ordered in Other Visits  "  Medication     glycopyrrolate (ROBINUL) injection     neostigmine (PROSTIGMINE) injection     Review of Systems: A complete review of systems was obtained and was negative except for what was noted above.    Physical examination:    /57 (BP Location: Right arm, Patient Position: Chair, Cuff Size: Adult Regular)  Pulse 72  Temp 98.9  F (37.2  C) (Temporal)  Ht 1.702 m (5' 7\")  Wt 66.8 kg (147 lb 3.2 oz)  BMI 23.05 kg/m2    General Appearance: NAD    Skin: There are no rashes or other skin lesions.    Musculoskeletal:  Pes cavus present.     Neurologic examination:    Mental status:  Patient is alert, attentive, and oriented x 3.  Language is coherent and fluent without aphasia.  Memory, comprehension and ability to follow commands were intact.       Cranial nerves:  Pupils were round and reacted to light.  Extraocular movements were full. There was no face, jaw, palate or tongue weakness or atrophy. Shoulder shrug was normal.       Motor exam: No atrophy or fasciculations. Manual muscle testing revealed the following MRC grade muscle power:   Right Left   Neck flexion 5 5   Neck extension: 5 5   Shoulder abduction:  5 5   Elbow extension: 5 5   Elbow flexion:  5 5   Wrist flexion:  5 5   Wrist extension:  5 5   Finger flexion 5 5   FDI 4 4   APB 4 4   Hip flexion 5 5   Knee flexion 5 5   Knee extension 5 5   Dorsiflexion 4+ 3   Plantar flexion 5 5     Complex motor skills: Mild postural hand tremor. No ataxia     Sensory exam: Pin decreased below mid leg and in the finger tips. Pin loss is more dense in left peroneal distribution - perhaps right as well. Vibration reduced in toes > ankles.     Gait: Strides short with slightly broad base. Has a marche à petits pas appearance.     Deep tendon reflexes:   Right Left   Triceps 2 2   Biceps 2 2   Brachioradialis 2 2   Knee jerk 1-2 1-2   Ankle jerk 0 0   Plantar responses were flexor bilaterally.       Assessment:    Noe Florence is a 82 year old man with a " a left foot drop and a sensorimotor polyneuropathy. I do not understand the mechanism by which he developed a left peroneal neuropathy. This has some importance as acute and stable causes would most likely be attributable focal compression, while insidious and progressive causes suggest an ongoing/active neuropathic process. His history suggests a remote and monophasic injury, but it is hard to say for sure. 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 Because about 2/3rds of those with polyneuropathy + IgM MGUS also have antibodies to MAG, it would be helpful to know if his foot dorsiflexion weakness was due to a separate mechanical cause or if instead it was related to the polyneuropathy. I am going to check him for MAG antibodies and also look for characteristic findings on NCS. Regarding treatment, the focus is on supportive care with physical therapy and gait support. Even if MAG associated polyneuropathy is confirmed, most such neuropathies are slowly progressive with or without immunotherapy. On occasion MAG neuropathy worsens at a rapid pace or results in the accumulation in substantial disability, and in these circumstances the risk-benefit analysis can favor an immunotherapy trial (hence the importance of knowing the cause of his foot drop). For now will focus on better defining the polyneuropathy as below and also on supportive management with physical therapy. I will follow his neuropathy clinically, and depending on what is found during his work up, offer additional treatment recommendations pending his clinical course. All questions answered.     Plan:      1. Labs: Will check MAG antibody today. Awaiting results from serum IF.   2. Nerve conduction studies/EMG to better characterize polyneuropathy  3. Encouraged continued physical therapy and gait support as this is the most important aspect of his treatment  4.  Note made of distractibility and poor attention. I encouraged him to discuss this further with Dr. Garcia and his PCP  5. Follow up in about 3 months. Sooner if needed.   ---    Sincerely,    Jase Duarte MD

## 2018-03-27 NOTE — PATIENT INSTRUCTIONS
DR Duarte,  I have been working with Camille in neuro PT for the past 6 weeks. He has many chronic issues that need to be addressed in PT. I sent him to neurology because of such significant bilateral leg weakness with left leg worse. He also has a knee contracture on the left that I do not think will respond to stretching. I can hear/feel the adhesions when we do work on his left knee (see xray). He also has an AFO that he just started wearing in the past 6 weeks but also has ulcer/callous on left foot so has cut back on the AFO. He is wearing compression socks and they made a big difference. His gait has improved in speed/quality.    Posture: He can get more extension when he tries but he falls asleep in a chair with trunk flexed so that is not helping.     One of the biggest issues right now is cognitive with distractability and possible OCD. He scored 26/30 on the MOCA but OT has not done any further testing yet (only one visit). Because of cognitive issues follow through on Home program has been limited.     We have about 4 more appts approved by insurance.    Nicolette Bauamn DPT, MPT, NCS

## 2018-03-27 NOTE — PROGRESS NOTES
Chief Complaint: foot drop and poor balance    History of Present Illness:    Noe Florence is an 82 year old man who I am seeing for evaluation of leg weakness. He was kindly referred from Nicolette Bauman, physical therapy. Noe has had a left foot drop for many years. He is not sure when this started. He does not feel his foot is getting weaker - the strength in the foot seems to be stable. I reviewed NCS/EMG that were performed in 2014. Those studies showed what was most certainly a left peroneal neuropathy. Although it could not be localized with certainty, the lesion probably came from at or near the knee. There also were some findings on the study to suggest a superimposed polyradicular process involving L4-S1, but the peroneal neuropathy was far out of proportion to the radicular findings. He denies numbness in either foot. He has some calluses in his feet which are uncomfortable, but otherwise no burning pain. One of his main concerns is poor balance. He walks with a cane since December. He has recently started to use a left AFO. This was helpful but he developed an ulcer from the AFO. He denies numbness or weakness in his hands. No tremor. No bowel or bladder changes. No back pain.    He denies any changes in his behavior, personality or memory, although does endorse some increased distractability of late. I do see a comment from PT that maintaining attention has been problematic. His history is also notable for colon cancer, s/p resection about 4 years ago. He never received chemotherapy.     I understand he saw Dr. Garcia just a few days ago but do not have access to any of those notes.     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    Prior  electrophysiologic work-up:  6/14: Nerve conduction studies/EMG performed at [facility] showed a left common peroneal neuropathy vs L5 radiculopathy. 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      Past Medical History:   Past Medical History:   Diagnosis Date     Advanced open-angle glaucoma      Antiplatelet or antithrombotic long-term use      Atrial fibrillation (H)      CKD (chronic kidney disease), stage III 2005     Colon cancer (H)     Stage II-B colon cancer     Coronary artery disease     s/p CABG x 2, JEREMY x 2     Diverticulitis      Hyperlipidemia      Hypertension      Ischemic cardiomyopathy      MGUS (monoclonal gammopathy of unknown significance)      Nonsenile cataract     BE     Pacemaker      Polymorphic ventricular tachycardia (H)      Polymyalgia rheumatica (H)      PVD (posterior vitreous detachment), both eyes 2005     S/P ablation of atrial flutter 6/20/14    CTI     Stented coronary artery      SVT (supraventricular tachycardia) (H)     PPM/AICD for NSVT     Upper leg DVT (deep venous thromboembolism), chronic (H)     Left     Weight loss, unintentional 2017    15 lb in 4 months     Past Surgical History:  Past Surgical History:   Procedure Laterality Date     C CABG, ARTERY-VEIN, FOUR  2006    LIMA-LAD, SVG-Rt PDA, SVG-OM2, SVG-Diag 1     C CABG, VEIN, SINGLE  1994    1-vessel CABG, SVG->PDRCA      CATARACT IOL, RT/LT Bilateral      COLONOSCOPY  3/13/2014    Procedure: COMBINED COLONOSCOPY, SINGLE BIOPSY/POLYPECTOMY BY BIOPSY;;  Surgeon: Mary Gerber MD;  Location:  GI     COLONOSCOPY N/A 6/22/2015    Procedure: COLONOSCOPY;  Surgeon: Marilin Newman MD;  Location:  GI     H ABLATION ATRIAL FLUTTER       HEART CATH DRUG ELUTING STENT PLACEMENT  4/19/2012    JEREMY x 2 to LCx     IMPLANT IMPLANTABLE CARDIOVERTER DEFIBRILLATOR  5-    ppm/aicd     KNEE SURGERY      right and left knee surgeries     LAPAROSCOPIC ASSISTED  COLECTOMY LEFT (DESCENDING)  4/8/2014    Procedure: LAPAROSCOPIC ASSISTED COLECTOMY LEFT (DESCENDING);  Hand assisted Laparoscopic Sigmoid Colectomy , Laparoscopic mobilization of spleenic flexure *Latex Allergy*Anesthesia General with Block;  Surgeon: Chanelle Guzmán MD;  Location: [facility] OR     REPAIR VALVE MITRAL  2006     30-mm Medtronic Julian ring      SELECTIVE LASER TRABECULOPLASTY (SLT) OD (RIGHT EYE)  4/10, 1/12,+1/9/13    RE     SELECTIVE LASER TRABECULOPLASTY (SLT) OS (LEFT EYE)  5/2012     SHOULDER SURGERY      right rotator cuff     Family history:    There is no known family history of hereditary neuropathies or other neuromuscular disorders.    Social History:    He denies tobacco, alcohol, or illicit drug use.     Medical Allergies:     Allergies   Allergen Reactions     Latex Rash     Rash     Current Medications:   Current Outpatient Prescriptions   Medication     atorvastatin (LIPITOR) 40 MG tablet     doxazosin (CARDURA) 2 MG tablet     metoprolol tartrate (LOPRESSOR) 25 MG tablet     order for DME     warfarin (COUMADIN) 5 MG tablet     Blood Pressure Monitor KIT     triamcinolone (KENALOG) 0.1 % cream     ketoconazole (NIZORAL) 2 % cream     loratadine (CLARITIN) 10 MG tablet     Multiple Vitamins-Minerals (CENTRUM SILVER) per tablet     aspirin 81 MG tablet     mirtazapine (REMERON) 15 MG tablet     sertraline (ZOLOFT) 100 MG tablet     cyanocobalamin (VITAMIN  B-12) 1000 MCG tablet     bacitracin ointment     ciclopirox (LOPROX) 0.77 % cream     fluticasone (FLONASE) 50 MCG/ACT nasal spray     No current facility-administered medications for this visit.      Facility-Administered Medications Ordered in Other Visits   Medication     glycopyrrolate (ROBINUL) injection     neostigmine (PROSTIGMINE) injection     Review of Systems: A complete review of systems was obtained and was negative except for what was noted above.    Physical examination:    /57 (BP Location: Right arm,  "Patient Position: Chair, Cuff Size: Adult Regular)  Pulse 72  Temp 98.9  F (37.2  C) (Temporal)  Ht 1.702 m (5' 7\")  Wt 66.8 kg (147 lb 3.2 oz)  BMI 23.05 kg/m2    General Appearance: NAD    Skin: There are no rashes or other skin lesions.    Musculoskeletal:  Pes cavus present.     Neurologic examination:    Mental status:  Patient is alert, attentive, and oriented x 3.  Language is coherent and fluent without aphasia.  Memory, comprehension and ability to follow commands were intact.       Cranial nerves:  Pupils were round and reacted to light.  Extraocular movements were full. There was no face, jaw, palate or tongue weakness or atrophy. Shoulder shrug was normal.       Motor exam: No atrophy or fasciculations. Manual muscle testing revealed the following MRC grade muscle power:   Right Left   Neck flexion 5 5   Neck extension: 5 5   Shoulder abduction:  5 5   Elbow extension: 5 5   Elbow flexion:  5 5   Wrist flexion:  5 5   Wrist extension:  5 5   Finger flexion 5 5   FDI 4 4   APB 4 4   Hip flexion 5 5   Knee flexion 5 5   Knee extension 5 5   Dorsiflexion 4+ 3   Plantar flexion 5 5     Complex motor skills: Mild postural hand tremor. No ataxia     Sensory exam: Pin decreased below mid leg and in the finger tips. Pin loss is more dense in left peroneal distribution - perhaps right as well. Vibration reduced in toes > ankles.     Gait: Strides short with slightly broad base. Has a marche à petits pas appearance.     Deep tendon reflexes:   Right Left   Triceps 2 2   Biceps 2 2   Brachioradialis 2 2   Knee jerk 1-2 1-2   Ankle jerk 0 0   Plantar responses were flexor bilaterally.       Assessment:    Noe Florence is a 82 year old man with a a left foot drop and a sensorimotor polyneuropathy. I do not understand the mechanism by which he developed a left peroneal neuropathy. This has some importance as acute and stable causes would most likely be attributable focal compression, while insidious and " progressive causes suggest an ongoing/active neuropathic process. His history suggests a remote and monophasic injury, but it is hard to say for sure. 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 Because about 2/3rds of those with polyneuropathy + IgM MGUS also have antibodies to MAG, it would be helpful to know if his foot dorsiflexion weakness was due to a separate mechanical cause or if instead it was related to the polyneuropathy. I am going to check him for MAG antibodies and also look for characteristic findings on NCS. Regarding treatment, the focus is on supportive care with physical therapy and gait support. Even if MAG associated polyneuropathy is confirmed, most such neuropathies are slowly progressive with or without immunotherapy. On occasion MAG neuropathy worsens at a rapid pace or results in the accumulation in substantial disability, and in these circumstances the risk-benefit analysis can favor an immunotherapy trial (hence the importance of knowing the cause of his foot drop). For now will focus on better defining the polyneuropathy as below and also on supportive management with physical therapy. I will follow his neuropathy clinically, and depending on what is found during his work up, offer additional treatment recommendations pending his clinical course. All questions answered.     Plan:      1. Labs: Will check MAG antibody today. Awaiting results from serum IF.   2. Nerve conduction studies/EMG to better characterize polyneuropathy  3. Encouraged continued physical therapy and gait support as this is the most important aspect of his treatment  4. Note made of distractibility and poor attention. I encouraged him to discuss this further with Dr. Garcia and his PCP  5. Follow up in about 3 months. Sooner if needed.   ---

## 2018-03-27 NOTE — PROGRESS NOTES
ANTICOAGULATION FOLLOW-UP CLINIC VISIT    Patient Name:  Noe Florence  Date:  3/27/2018  Contact Type:  Telephone    SUBJECTIVE:        OBJECTIVE    INR   Date Value Ref Range Status   03/27/2018 1.93 (H) 0.86 - 1.14 Final       ASSESSMENT / PLAN  INR assessment THER    Recheck INR In: 6 WEEKS    INR Location Clinic      Anticoagulation Summary as of 3/27/2018     INR goal 2.0-3.0   Today's INR 1.93!   Maintenance plan 5 mg (5 mg x 1) every day   Full instructions 3/27: 7.5 mg; Otherwise 5 mg every day   Weekly total 35 mg   Plan last modified Nguyen Zuniga RN (12/21/2017)   Next INR check 5/8/2018   Priority INR   Target end date Indefinite    Indications   Atrial fibrillation (H) [I48.91] [I48.91]  Long-term (current) use of anticoagulants [Z79.01] [Z79.01]         Anticoagulation Episode Summary     INR check location     Preferred lab     Send INR reminders to [facility]    Comments Patient contact number: [phone]   Can leave results with Rica (friend)      Anticoagulation Care Providers     Provider Role Specialty Phone number    Deedee Baird MD Responsible Cardiology [phone]            See the Encounter Report to view Anticoagulation Flowsheet and Dosing Calendar (Go to Encounters tab in chart review, and find the Anticoagulation Therapy Visit)  Left message for patient with results and dosing recommendations. Asked patient to call back to report any missed doses, falls, signs and symptoms of bleeding or clotting, any changes in health, medication, or diet. Asked patient to call back with any questions or concerns.      Leena Abebe RN             3/28/18  Spoke to patient.  He was unable to retrieve the message left for him.  Updated the Anticoagulation tracking flowsheet.  He will take 7.5mg today, and check in six weeks.  Confirmed recommendation.    Ciro Sharp RN

## 2018-03-27 NOTE — MR AVS SNAPSHOT
After Visit Summary   3/27/2018    Noe Florence    MRN: [id number]           Patient Information     Date Of Birth          1935        Visit Information        Provider Department      3/27/2018 11:15 AM Rica Bauman, PT [facility]        Care Instructions    DR Duarte,  I have been working with BIll in neuro PT for the past 6 weeks. He has many chronic issues that need to be addressed in PT. I sent him to neurology because of such significant bilateral leg weakness with left leg worse. He also has a knee contracture on the left that I do not think will respond to stretching. I can hear/feel the adhesions when we do work on his left knee (see xray). He also has an AFO that he just started wearing in the past 6 weeks but also has ulcer/callous on left foot so has cut back on the AFO. He is wearing compression socks and they made a big difference. His gait has improved in speed/quality.    Posture: He can get more extension when he tries but he falls asleep in a chair with trunk flexed so that is not helping.     One of the biggest issues right now is cognitive with distractability and possible OCD. He scored 26/30 on the MOCA but OT has not done any further testing yet (only one visit). Because of cognitive issues follow through on Home program has been limited.     We have about 4 more appts approved by insurance.    Nicolette Bauman DPT, MPT, NCS          Follow-ups after your visit        Your next 10 appointments already scheduled     Mar 27, 2018  2:30 PM CDT   New Neuropathy with Jase Duarte MD   [facility] ([facility])    [address]  [address] [place] [address] [phone]            Mar 30, 2018 11:15 AM CDT   LAB with  LAB   [facility] ([facility] [place])    [address]  1st Floor  [facility] [address] [phone]           Please do not eat 10-12 hours before your appointment if you are coming in  fasting for labs on lipids, cholesterol, or glucose (sugar). This does not apply to pregnant women. Water, hot tea and black coffee (with nothing added) are okay. Do not drink other fluids, diet soda or chew gum.            Mar 30, 2018 11:40 AM CDT   (Arrive by 11:25 AM)   CT CHEST/ABDOMEN/PELVIS W CONTRAST with UCCT2   [facility] CT ([facility])    [address]  1st Floor  [facility] [address] [phone]           Please bring any scans or X-rays taken at other hospitals, if similar tests were done. Also bring a list of your medicines, including vitamins, minerals and over-the-counter drugs. It is safest to leave personal items at home.  Be sure to tell your doctor:   If you have any allergies.   If there s any chance you are pregnant.   If you are breastfeeding.  You may have contrast for this exam. To prepare:   Do not eat or drink for 2 hours before your exam. If you need to take medicine, you may take it with small sips of water. (We may ask you to take liquid medicine as well.)   The day before your exam, drink extra fluids at least six 8-ounce glasses (unless your doctor tells you to restrict your fluids).   You will be given instructions on how to drink the contrast.  Patients over 70 or patients with diabetes or kidney problems:   If you haven t had a blood test (creatinine test) within the last 30 days, the Cardiologist/Radiologist may require you to get this test prior to your exam.  If you have diabetes:   Continue to take your metformin medication on the day of your exam  Please wear loose clothing, such as a sweat suit or jogging clothes. Avoid snaps, zippers and other metal. We may ask you to undress and put on a hospital gown.  If you have any questions, please call the Imaging Department where you will have your exam.            Mar 30, 2018  1:00 PM CDT   (Arrive by 12:45 PM)   Neuro Treatment with Rica Bauman PT   [facility] ([facility] [place])    [address] [place] [address]   [phone]            Apr 02, 2018 10:45 AM CDT   (Arrive by 10:30 AM)   Return Visit with Francisco Gilliam MD   [facility] ([facility] [place])    [address] [place] [address] [address]  [facility] [address]   [phone]            Apr 03, 2018 11:15 AM CDT   (Arrive by 11:00 AM)   Neuro Treatment with Rica Bauman PT    [facility] ([facility])    [address] [place] [address]   [phone]            Apr 03, 2018  1:00 PM CDT   Treatment 60 with Courtney Meehan OT   [facility] ([facility])    [address] [place] [address]   [phone]            Apr 10, 2018 11:15 AM CDT   (Arrive by 11:00 AM)   Neuro Treatment with LU Moffett [facility] ([facility])    [address] [place] [address]   [phone]            Apr 13, 2018  9:00 AM CDT   (Arrive by 8:45 AM)   Neuro Treatment with LU Moffett [facility] ([facility])    [address] [place] [address]   [phone]            Apr 17, 2018 11:15 AM CDT   (Arrive by 11:00 AM)   Neuro Treatment with LU Moffett [facility] ([facility])    [address] [place] [address]   [phone]              Who to contact     Please call your clinic at [phone] to:    Ask questions about your health    Make or cancel appointments    Discuss your medicines    Learn about your test results    Speak to your doctor            Additional Information About Your Visit        MyChart Information     MyChart gives you secure access to your electronic health record. If you see a primary care provider, you can also send messages to your  care team and make appointments. If you have questions, please call your primary care clinic.  If you do not have a primary care provider, please call [phone] and they will assist you.      [employer] is an electronic gateway that provides easy, online access to your medical records. With [employer], you can request a clinic appointment, read your test results, renew a prescription or communicate with your care team.     To access your existing account, please contact your [facility] or call [phone] for assistance.        Care EveryWhere ID     This is your Care EveryWhere ID. This could be used by other organizations to access your [facility] medical records  [id number]         Blood Pressure from Last 3 Encounters:   03/23/18 125/74   02/12/18 95/51   01/15/18 123/67    Weight from Last 3 Encounters:   03/23/18 65.1 kg (143 lb 8 oz)   01/15/18 67.8 kg (149 lb 8 oz)   12/21/17 68.2 kg (150 lb 6.4 oz)              Today, you had the following     No orders found for display       Primary Care Provider Office Phone # Fax #    Roberto Sarmiento [phone]       [address] [place] [address] [address]  [facility] [address]        Equal Access to Services     LEE CASTILLO : Hadii aad ku hadasho Soomaali, waaxda luqadaha, qaybta kaalmada adeegyada, waxay sera hayomairan harmeet bernabe lamaxine minor. So [facility] [phone].    ATENCIÓN: Si habla español, tiene a aguirre disposición servicios gratuitos de asistencia lingüística. Ll[facility] [phone].    We comply with applicable federal civil rights laws and [place] laws. We do not discriminate on the basis of race, color, national origin, age, disability, sex, sexual orientation, or gender identity.            Thank you!     Thank you for choosing [facility]  for your care. Our goal is always to provide you with excellent care. Hearing back from our patients is one way we can continue to improve our services. Please take a few minutes to  complete the written survey that you may receive in the mail after your visit with us. Thank you!             Your Updated Medication List - Protect others around you: Learn how to safely use, store and throw away your medicines at www.disposemymeds.org.          This list is accurate as of 3/27/18 12:04 PM.  Always use your most recent med list.                   Brand Name Dispense Instructions for use Diagnosis    aspirin 81 MG tablet      Take 1 tablet by mouth daily.        atorvastatin 40 MG tablet    LIPITOR    100 tablet    Take 1 tablet (40 mg) by mouth daily    Hyperlipidemia, unspecified hyperlipidemia type       bacitracin ointment     28 g    Apply topically 2 times daily APPLY TO LEFT LEG TWO TIMES PER DAY    Left leg cellulitis       Blood Pressure Monitor Kit     1 kit    1 Units daily    Hypertension       CENTRUM SILVER per tablet      Take 1 tablet by mouth daily. AM        ciclopirox 0.77 % cream    LOPROX    90 g    Apply topically 2 times daily To feet and toenails.    Dermatophytosis of nail, Tinea pedis of both feet       cyanocobalamin 1000 MCG tablet    vitamin  B-12    180 tablet    Take 2 tablets (2,000 mcg) by mouth daily    Anemia       doxazosin 2 MG tablet    CARDURA    90 tablet    Take 1/2 tab in the morning and 1/2 tab in the evening    Essential hypertension       fluticasone 50 MCG/ACT spray    FLONASE    3 Package    Spray 2 sprays into both nostrils daily    Unspecified sinusitis (chronic)       ketoconazole 2 % cream    NIZORAL    60 g    Apply topically daily On hold    Tinea corporis       loratadine 10 MG tablet    CLARITIN     Take 10 mg by mouth as needed        metoprolol tartrate 25 MG tablet    LOPRESSOR    180 tablet    Take 1 tablet (25 mg) by mouth 2 times daily    Coronary artery disease involving native heart without angina pectoris, unspecified vessel or lesion type       mirtazapine 15 MG tablet    REMERON     Take 15 mg by mouth At Bedtime.        order for DME      1 Units    20-30 mm Hg knee length compression stockings.  Measure and fit.  Style and color per patient preference.  Pta Hsu per patient need.    PVD (peripheral vascular disease) (H), Ulcer of left lower extremity, limited to breakdown of skin (H)       triamcinolone 0.1 % cream    KENALOG    80 g    Apply topically 2 times daily For up to two weeks in a row    Atopic eczema       warfarin 5 MG tablet    COUMADIN    35 tablet    7.5 mg on Wed; 5 mg all other days  or as instructed by coumadin clinic.    Atrial flutter (H)       ZOLOFT 100 MG tablet   Generic drug:  sertraline      Take 100 mg by mouth every evening.

## 2018-03-28 LAB — PROT ELPH PNL UR ELPH: NORMAL

## 2018-03-30 ENCOUNTER — RADIANT APPOINTMENT (OUTPATIENT)
Dept: CT IMAGING | Facility: CLINIC | Age: 83
End: 2018-03-30
Attending: NURSE PRACTITIONER
Payer: COMMERCIAL

## 2018-03-30 ENCOUNTER — ANTICOAGULATION THERAPY VISIT (OUTPATIENT)
Dept: ANTICOAGULATION | Facility: CLINIC | Age: 83
End: 2018-03-30

## 2018-03-30 ENCOUNTER — THERAPY VISIT (OUTPATIENT)
Dept: PHYSICAL THERAPY | Facility: CLINIC | Age: 83
End: 2018-03-30
Payer: COMMERCIAL

## 2018-03-30 DIAGNOSIS — I48.0 PAROXYSMAL ATRIAL FIBRILLATION (H): ICD-10-CM

## 2018-03-30 DIAGNOSIS — Z79.01 LONG-TERM (CURRENT) USE OF ANTICOAGULANTS: ICD-10-CM

## 2018-03-30 DIAGNOSIS — Z91.81 AT HIGH RISK FOR FALLS: ICD-10-CM

## 2018-03-30 DIAGNOSIS — C18.9 MALIGNANT NEOPLASM OF COLON, UNSPECIFIED PART OF COLON (H): ICD-10-CM

## 2018-03-30 DIAGNOSIS — M62.81 GENERALIZED MUSCLE WEAKNESS: ICD-10-CM

## 2018-03-30 DIAGNOSIS — R26.81 GAIT INSTABILITY: Primary | ICD-10-CM

## 2018-03-30 DIAGNOSIS — M21.372 LEFT FOOT DROP: ICD-10-CM

## 2018-03-30 DIAGNOSIS — M40.00 ACQUIRED POSTURAL KYPHOSIS: ICD-10-CM

## 2018-03-30 LAB
ALBUMIN SERPL-MCNC: 3.4 G/DL (ref 3.4–5)
ALP SERPL-CCNC: 111 U/L (ref 40–150)
ALT SERPL W P-5'-P-CCNC: 19 U/L (ref 0–70)
ANION GAP SERPL CALCULATED.3IONS-SCNC: 6 MMOL/L (ref 3–14)
AST SERPL W P-5'-P-CCNC: 17 U/L (ref 0–45)
BASOPHILS # BLD AUTO: 0 10E9/L (ref 0–0.2)
BASOPHILS NFR BLD AUTO: 0.3 %
BILIRUB SERPL-MCNC: 0.2 MG/DL (ref 0.2–1.3)
BUN SERPL-MCNC: 44 MG/DL (ref 7–30)
CALCIUM SERPL-MCNC: 9.1 MG/DL (ref 8.5–10.1)
CEA SERPL-MCNC: 3.8 UG/L (ref 0–2.5)
CHLORIDE SERPL-SCNC: 108 MMOL/L (ref 94–109)
CO2 SERPL-SCNC: 26 MMOL/L (ref 20–32)
CREAT BLD-MCNC: 1.2 MG/DL (ref 0.66–1.25)
CREAT SERPL-MCNC: 1.15 MG/DL (ref 0.66–1.25)
DIFFERENTIAL METHOD BLD: ABNORMAL
EOSINOPHIL # BLD AUTO: 0.3 10E9/L (ref 0–0.7)
EOSINOPHIL NFR BLD AUTO: 4.3 %
ERYTHROCYTE [DISTWIDTH] IN BLOOD BY AUTOMATED COUNT: 13.6 % (ref 10–15)
GFR SERPL CREATININE-BSD FRML MDRD: 58 ML/MIN/1.7M2
GFR SERPL CREATININE-BSD FRML MDRD: 61 ML/MIN/1.7M2
GLUCOSE SERPL-MCNC: 93 MG/DL (ref 70–99)
HCT VFR BLD AUTO: 32.2 % (ref 40–53)
HGB BLD-MCNC: 10.2 G/DL (ref 13.3–17.7)
IMM GRANULOCYTES # BLD: 0 10E9/L (ref 0–0.4)
IMM GRANULOCYTES NFR BLD: 0.3 %
INR PPP: 2.96 (ref 0.86–1.14)
LYMPHOCYTES # BLD AUTO: 0.8 10E9/L (ref 0.8–5.3)
LYMPHOCYTES NFR BLD AUTO: 13.6 %
MCH RBC QN AUTO: 30.5 PG (ref 26.5–33)
MCHC RBC AUTO-ENTMCNC: 31.7 G/DL (ref 31.5–36.5)
MCV RBC AUTO: 96 FL (ref 78–100)
MONOCYTES # BLD AUTO: 0.8 10E9/L (ref 0–1.3)
MONOCYTES NFR BLD AUTO: 12.6 %
NEUTROPHILS # BLD AUTO: 4.2 10E9/L (ref 1.6–8.3)
NEUTROPHILS NFR BLD AUTO: 68.9 %
NRBC # BLD AUTO: 0 10*3/UL
NRBC BLD AUTO-RTO: 0 /100
PLATELET # BLD AUTO: 164 10E9/L (ref 150–450)
POTASSIUM SERPL-SCNC: 4.2 MMOL/L (ref 3.4–5.3)
PROT SERPL-MCNC: 7.5 G/DL (ref 6.8–8.8)
RBC # BLD AUTO: 3.34 10E12/L (ref 4.4–5.9)
SODIUM SERPL-SCNC: 140 MMOL/L (ref 133–144)
WBC # BLD AUTO: 6 10E9/L (ref 4–11)

## 2018-03-30 RX ORDER — IOPAMIDOL 755 MG/ML
91 INJECTION, SOLUTION INTRAVASCULAR ONCE
Status: DISCONTINUED | OUTPATIENT
Start: 2018-03-30 | End: 2018-03-30

## 2018-03-30 RX ORDER — IOPAMIDOL 755 MG/ML
91 INJECTION, SOLUTION INTRAVASCULAR ONCE
Status: COMPLETED | OUTPATIENT
Start: 2018-03-30 | End: 2018-03-30

## 2018-03-30 RX ADMIN — IOPAMIDOL 91 ML: 755 INJECTION, SOLUTION INTRAVASCULAR at 12:03

## 2018-03-30 NOTE — PROGRESS NOTES
ANTICOAGULATION FOLLOW-UP CLINIC VISIT    Patient Name:  Noe Florence  Date:  3/30/2018  Contact Type:  Telephone    SUBJECTIVE:        OBJECTIVE    INR   Date Value Ref Range Status   03/30/2018 2.96 (H) 0.86 - 1.14 Final       ASSESSMENT / PLAN  INR assessment THER    Recheck INR In: 6 WEEKS    INR Location Clinic      Anticoagulation Summary as of 3/30/2018     INR goal 2.0-3.0   Today's INR 2.96   Maintenance plan 5 mg (5 mg x 1) every day   Full instructions 5 mg every day   Weekly total 35 mg   Plan last modified Nguyen Zuniga RN (12/21/2017)   Next INR check 5/11/2018   Priority INR   Target end date Indefinite    Indications   Atrial fibrillation (H) [I48.91] [I48.91]  Long-term (current) use of anticoagulants [Z79.01] [Z79.01]         Anticoagulation Episode Summary     INR check location     Preferred lab     Send INR reminders to [facility]    Comments Patient contact number: [phone]   Can leave results with Rica (friend)      Anticoagulation Care Providers     Provider Role Specialty Phone number    Deedee Baird MD Responsible Cardiology [phone]            See the Encounter Report to view Anticoagulation Flowsheet and Dosing Calendar (Go to Encounters tab in chart review, and find the Anticoagulation Therapy Visit)    Left message for patient with results and dosing recommendations. Asked patient to call back to report any missed doses, falls, signs and symptoms of bleeding or clotting, any changes in health, medication, or diet. Asked patient to call back with any questions or concerns.      Leena Abebe RN

## 2018-03-30 NOTE — PROGRESS NOTES
Outpatient Physical Therapy Progress Note     Patient: Noe Florence  : 1935    Beginning/End Dates of Reporting Period:  2018 to 3/30/2018. He has been seen for 10 visits this episode of care. Treatment has focused on his posture, lengthening of spine, gait, leg strengthening and overall case mgmt.    Referring Provider:  DR Sarmiento    Therapy Diagnosis: Left foot drop and balance problem     Client Self Report:  I am constantly looking for something at home (cant find things due to the clutter) and that distracts him from the exercises. They did the exs together last night and Ld organized the exs. She reports they have too many exs to do. His goal is to get on the bus and go places alone. Yesterday was in the house all day wearing slippers and his left foot did hurt but no appt with podiatrist again until 2018.     Objective Measurements:  Objective Measure: 25fTW with no AD, no AFO <9 seconds and 15 steps. Repeat <9 seconds and 15 steps so its consisitent but he knows he is being tested.   Details: This is .9 m/s    Objective Measure: sit to stand from 23 inch ht surface without hands. Wide SARAH  Details: 8 reps in 30 seconds. He fell back one time to the bed but it ws controlled (didnt get wt forward when trying to stand up.    Objective Measure: right foot: compression stocking off: no lines from it. There is a callous/blister on first toe met head. This does not bother him when walking.  Left foot: The wound under the great toe met head is covered. The 2-4 toes have a toe pad of some sort attached to decrease pain/prevent the tops of the toes which touch the ground due to hammer toes. There is a callous with dried blood on third toe??  Also a callous on fifth toe at cuboid? There is a line about half way up shin from sock but today the sock was up all the way so perhaps it was down last night part way (as he falls asleep with socks on and while in a chair).  Left shoe has added  padding by podiatrist on the insole (slippers do not have this).     Goals:  Goal Identifier gait speed/tolerance-progressing nicely   Goal Description Amb 25feet with appropriate AD and  for 5 minutes or longer at a speed of .8 m/s consistently for household/community mobility   Target Date 05/16/18   Date Met      Progress:     Goal Identifier adaptive equipment-slow progress   Goal Description He will identify if any adaptive equipment is helpful for transfers (car transfer bar, bed rail, toilet safety frame)   Target Date 05/16/18   Date Met      Progress:     Goal Identifier -HEP-limited progress due to cognition   Goal Description They will be able to do HEP of stretches for posture, left leg and strengthening on a regular basis.to address some of his impaiments   Target Date 05/16/18   Date Met      Progress: Rica helped him due HEP on 3/29 but she does not want to have to do this with him. She wants him to be IND.     Progress Toward Goals:   There has been an improvement in his gait and possible improvement in the sit to stand test (leg strength) but he has a lot of difficulty doing the HEP. He is distracted by the clutter in the house trying to find things so doesn't get the exs done. HE has very limited activity level.He falls asleep sitting in a chair with entire trunk flexed forward-he sleeps in the bed <50% of the time. His left knee has a contracture and you can hear/feel the adhesions. Many chronic medical issues that will take time and effort by him to improve. Im not sure he will be able to do HEP without Rica's assistance and she is frustrated with that.     Plan:  Continue therapy per current plan of care. See hm every week to continue to address his posture, leg weakness bilaterally and gait.     Discharge:  No    Nicolette Bauman DPT, MPT, NCS

## 2018-03-30 NOTE — MR AVS SNAPSHOT
Noe Florence   3/30/2018   Anticoagulation Therapy Visit    Description:  82 year old male   Provider:  Leena Abebe RN   Department:  [facility]           INR as of 3/30/2018     Today's INR 2.96      Anticoagulation Summary as of 3/30/2018     INR goal 2.0-3.0   Today's INR 2.96   Full instructions 5 mg every day   Next INR check 5/11/2018    Indications   Atrial fibrillation (H) [I48.91] [I48.91]  Long-term (current) use of anticoagulants [Z79.01] [Z79.01]         March 2018 Details    Sun Mon Tue Wed Thu Fri Sat         1               2               3                 4               5               6               7               8               9               10                 11               12               13               14               15               16               17                 18               19               20               21               22               23               24                 25               26               27               28               29               30      5 mg   See details      31      5 mg          Date Details   03/30 This INR check               How to take your warfarin dose     To take:  5 mg Take 1 of the 5 mg tablets.           April 2018 Details    Sun Mon Tue Wed Thu Fri Sat     1      5 mg         2      5 mg         3      5 mg         4      5 mg         5      5 mg         6      5 mg         7      5 mg           8      5 mg         9      5 mg         10      5 mg         11      5 mg         12      5 mg         13      5 mg         14      5 mg           15      5 mg         16      5 mg         17      5 mg         18      5 mg         19      5 mg         20      5 mg         21      5 mg           22      5 mg         23      5 mg         24      5 mg         25      5 mg         26      5 mg         27      5 mg         28      5 mg           29      5 mg         30      5 mg               Date Details   No  additional details            How to take your warfarin dose     To take:  5 mg Take 1 of the 5 mg tablets.           May 2018 Details    Sun Mon Tue Wed Thu Fri Sat       1      5 mg         2      5 mg         3      5 mg         4      5 mg         5      5 mg           6      5 mg         7      5 mg         8      5 mg         9      5 mg         10      5 mg         11            12                 13               14               15               16               17               18               19                 20               21               22               23               24               25               26                 27               28               29               30               31                  Date Details   No additional details    Date of next INR:  5/11/2018         How to take your warfarin dose     To take:  5 mg Take 1 of the 5 mg tablets.

## 2018-03-30 NOTE — DISCHARGE INSTRUCTIONS

## 2018-04-02 ENCOUNTER — ONCOLOGY VISIT (OUTPATIENT)
Dept: ONCOLOGY | Facility: CLINIC | Age: 83
End: 2018-04-02
Attending: INTERNAL MEDICINE
Payer: COMMERCIAL

## 2018-04-02 VITALS
SYSTOLIC BLOOD PRESSURE: 119 MMHG | WEIGHT: 145.9 LBS | OXYGEN SATURATION: 100 % | RESPIRATION RATE: 18 BRPM | BODY MASS INDEX: 22.9 KG/M2 | TEMPERATURE: 97.2 F | DIASTOLIC BLOOD PRESSURE: 62 MMHG | HEART RATE: 63 BPM | HEIGHT: 67 IN

## 2018-04-02 DIAGNOSIS — C18.9 MALIGNANT NEOPLASM OF COLON, UNSPECIFIED PART OF COLON (H): Primary | ICD-10-CM

## 2018-04-02 LAB — PNP ABY SERUM: NORMAL

## 2018-04-02 PROCEDURE — 99214 OFFICE O/P EST MOD 30 MIN: CPT | Mod: ZP | Performed by: INTERNAL MEDICINE

## 2018-04-02 PROCEDURE — G0463 HOSPITAL OUTPT CLINIC VISIT: HCPCS | Mod: ZF

## 2018-04-02 ASSESSMENT — PAIN SCALES - GENERAL: PAINLEVEL: NO PAIN (0)

## 2018-04-02 NOTE — NURSING NOTE
"Oncology Rooming Note    April 2, 2018 11:08 AM   Noe Florence is a 82 year old male who presents for:    Chief Complaint   Patient presents with     Oncology Clinic Visit     Return visit related to colon Cancer     Initial Vitals: /62 (Patient Position: Sitting, Cuff Size: Adult Small)  Pulse 63  Temp 97.2  F (36.2  C) (Tympanic)  Resp 18  Ht 1.702 m (5' 7.01\")  Wt 64.4 kg (142 lb)  SpO2 100%  BMI 22.24 kg/m2 Estimated body mass index is 22.24 kg/(m^2) as calculated from the following:    Height as of this encounter: 1.702 m (5' 7.01\").    Weight as of this encounter: 64.4 kg (142 lb). Body surface area is 1.74 meters squared.  No Pain (0) Comment: Data Unavailable   No LMP for male patient.  Allergies reviewed: Yes  Medications reviewed: Yes    Medications: Medication refills not needed today.  Pharmacy name entered into [facility]: [facility] [address] [place] [address] [address]    Clinical concerns: No new concerns. Provider was notified.    10 minutes for nursing intake (face to face time)     Pennie Hoang LPN            "

## 2018-04-02 NOTE — PROGRESS NOTES
Stage colon ca, question of liver met in past--none now, losing weight    Currently HOMER    Noe Madrid is here today in follow-up of colon cancer.    He had stage II disease resected several years ago and is here today for planned surveillance.  He has had mild elevations of his CEA level, and at one point had a very small lesion in his liver of uncertain significance.  Since I saw him last he continues to lose weight.  His girlfriend who accompanies him believes this is simply because he does not feed himself enough, and he tells me he gets hungry but just does not make meals very often.  His overall strength and mobility seem to have declined a bit.  He is seeing neurology who feels he may have a peripheral neuropathy which is undergoing further evaluation, and he has been referred to physical therapy to work on strengthening and balance.  On a 10 point complete review of systems I could elicit no other significant specific symptoms although he clearly was minimizing.    On physical exam Mr. Madrid appears somewhat cachectic and more frail than I recall.  His vital signs as noted in chart are unremarkable.    He has no scleral icterus and no palpable adenopathy in his neck or supraclavicular spaces.  I could not palpate his thyroid.  His lungs are clear to auscultation without dullness to percussion.  He has no audible murmur and his jugular venous pressure appears normal.  His abdomen is soft and nontender. No ascites is demonstrable and there are no palpable masses.  His speech is fluent.  He was able to mount the exam table unassisted, but with great difficulty.    I reviewed with him his CT scan and lab work.  His CT scan shows no evidence of recurrence of his disease.  He has normal electrolytes renal function and liver enzymes.  He has mild normocytic anemia consistent with prior values.  His CEA level today is 3.8 consistent with his prior values.    Assessment/plan: Resected stage II colon cancer.  At  present I can find no evidence of recurrence of his disease and his risk is relatively small.  He ongoing weight loss without clear etiology is a bit worrisome but at present I cannot relate that to his prior history of cancer.  We will follow-up with him again in another 6 months with a repeat CT scan and lab work.  I will defer further evaluation of his weight loss to his primary care physician.

## 2018-04-02 NOTE — LETTER
4/2/2018      RE: Noe Florence  [address] [facility] [address]       Stage colon ca, question of liver met in past--none now, losing weight    Currently HOMER    Noe Madrid is here today in follow-up of colon cancer.    He had stage II disease resected several years ago and is here today for planned surveillance.  He has had mild elevations of his CEA level, and at one point had a very small lesion in his liver of uncertain significance.  Since I saw him last he continues to lose weight.  His girlfriend who accompanies him believes this is simply because he does not feed himself enough, and he tells me he gets hungry but just does not make meals very often.  His overall strength and mobility seem to have declined a bit.  He is seeing neurology who feels he may have a peripheral neuropathy which is undergoing further evaluation, and he has been referred to physical therapy to work on strengthening and balance.  On a 10 point complete review of systems I could elicit no other significant specific symptoms although he clearly was minimizing.    On physical exam Mr. Madrid appears somewhat cachectic and more frail than I recall.  His vital signs as noted in chart are unremarkable.    He has no scleral icterus and no palpable adenopathy in his neck or supraclavicular spaces.  I could not palpate his thyroid.  His lungs are clear to auscultation without dullness to percussion.  He has no audible murmur and his jugular venous pressure appears normal.  His abdomen is soft and nontender. No ascites is demonstrable and there are no palpable masses.  His speech is fluent.  He was able to mount the exam table unassisted, but with great difficulty.    I reviewed with him his CT scan and lab work.  His CT scan shows no evidence of recurrence of his disease.  He has normal electrolytes renal function and liver enzymes.  He has mild normocytic anemia consistent with prior values.  His CEA level today is 3.8 consistent  with his prior values.    Assessment/plan: Resected stage II colon cancer.  At present I can find no evidence of recurrence of his disease and his risk is relatively small.  He ongoing weight loss without clear etiology is a bit worrisome but at present I cannot relate that to his prior history of cancer.  We will follow-up with him again in another 6 months with a repeat CT scan and lab work.  I will defer further evaluation of his weight loss to his primary care physician.    Francisco Gilliam MD

## 2018-04-02 NOTE — PROGRESS NOTES
Service Date: 2018      Roberto Sarmiento MD   [facility]    [address]   [place] [address]      RE: Noe Florence   MRN: [id number]   : 1935      Dear Dr. Sarmiento:      Thank you for referring Noe Florence for neurologic consultation on 2018.  The patient comes with chief complaints of imbalance, left foot drop, as well as paresthesias.  His other chief complaints include fatigue as well as weight loss.      The patient was accompanied by his partner, Rica Liboriomadeline.  The patient is a good historian.  He has had progressive problems with balance the last 3 years.  He did fall in the last year 4-6 times.  Fortunately, he suffered no injuries.  He has not fallen in the last few months.  He did note that he was able to play tennis but he stopped about 1-1/2 to 2 years ago in part because of this fear of not feeling secure on the court.  He has had a chronic left foot drop.  He has been getting therapy now from Rica Bauman.  He was given a brace for the left foot which sounds like an ankle orthosis but he stopped using it because it could cause ulcer.  He has probably not had any new tingling or numbness, but later he did indicate he may have some sensory disturbance involving his feet.  He has been able to control his stool but he has had trouble with chronic urinary incontinence and he tends to drip after he is done urinating.  He does note that he probably still sweats.  He still can have an erection.  He is not weak in his other 3 limbs.  He did note he does tend to cross his legs.  The patient has not had any joint pain or myalgias.  He denied spine discomfort.  There has been no Lhermitte sign.  He has not been short of breath.  The patient has had no trouble swallowing nor has he had difficulties with chewing.  There is no history of diplopia or ptosis.  He has not noticed any fasciculations.  He has a normal diet including meat, vegetables and fruit.  He is not  taking any supplements and specifically has not taken zinc or B6.  He did tell me he has normal stools, although occasionally it is loose.  He did not feel he had any significant memory problems.  He has been losing weight.  He does tend to stay up at night and reads or watches TV.  He sleeps into the morning but generally does not feel tired.  He does though have some fatigue issues.      The patient has had a number of studies done.  He did have a small monoclonal protein identified on 01/15/2018 that was IgM, I believe.  His CRP was less than 2.9. His sed rate was 43.  His hemoglobin was 10.7 and his MCV was 97.  His vitamin D level was 46 on 01/25/2017.  On 11/05/2017 he had normal metabolic profile except for chloride of 113 and a calcium of 8.2.  On 09/26/2017 he normal comprehensive metabolic profile including liver function tests.  He had on 01/15/2018 B12 level of 2106 picograms and an MMA of 0.35.  His magnesium level on 11/16/2016 was 2.2 and his TSH 0.94 and his CPK was 114.      The patient has had prior DVT of the left leg.  You have recorded that he has had an unintentional weight loss of 15 pounds in the last 4 months.      CURRENT MEDICATIONS:   The patient's current medications include:   1.  Coumadin.     2.  Oral B12.     3.  Cardura.     4.  Lipitor.     5.  Metoprolol.     6.  Claritin.     7.  Remeron 15 mg at bedtime.     8.  Sertraline 100 mg every evening.     9.  Flonase.     10.  Various creams.      The patient did have bypass surgery done in 2006.  In 1994, he had further cardiac surgery with bypass in 2006.  He has had cataract extractions, ablation for atrial flutter, defibrillator placed, bilateral knee surgeries, repair of mitral valve in 2006 as well as bilateral laser trabeculoplasty and he has had right shoulder rotator cuff surgery.      ALLERGIES:   The patient has allergies to latex.      There is no family history to suggest neuromuscular problems.  His father had coronary  artery disease and  at 80 of old age.  His mother  of old age at 89.  He had no other relevant family history.  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      The patient did have further records that indicate he had on 2014 a colectomy for cancer.  He did not receive any radiation treatment nor did he have chemotherapy.      The patient did tell me that with his left knee replacement he did not have good results and cannot totally straighten the leg, but does not have pain.      Other diagnoses include open angle glaucoma, ischemic cardiomyopathy, polymorphic ventricular tachycardia, previous diagnosis of polymyalgia rheumatica, with no current symptoms to suggest that problem.  The patient did note that he had been diagnosed with neuropathy in the past.  He was told that he had developed calluses with hammertoes over 20 years ago.  He said some of this he thought related to jamming his toes playing tennis.  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      The patient's echocardiogram on 2017 showed that he had an EF fraction of 55%-60%.  He had mild to moderate right ventricular dilatation.  He had severe bilateral atrial enlargement and this study had been compared with no change from 12/15/2015.      The patient lost his wife and has lived with his partner now for a number of years.  He quit smoking in .  He has used socially alcohol but not to a major extent.      Neurologic examination revealed a pleasant man.  He appeared his stated age.  He did not have any fasciculations.  When he attempted to lift his right arm where he had had rotator cuff surgery he did have prominent scapular winging.  He did also walk with a fairly severe left steppage gait with foot drop.  He had trouble with tandem, which would not be unusual at his age.  He had some postural instability on turns and did have a mildly wide-based gait.  He had negative Romberg.  Muscle stretch  reflexes were present but reduced at the right triceps and absent at both brachioradialis reflexes as well as ankle jerks.  His toes were downward going to plantar stimulation.  Strength testing showed that he had only antigravity movement of the left anterior tibialis muscle and no movement of the left toe extensors.  He had mild weakness of the left peroneus longus muscle and possibly the left posterior tibial muscle.  The patient could not fully extend the right arm and some of this could have related to the previous surgery or to muscular instability related to the scapular winging.  Otherwise, he had decreased sensation to pinprick to both ankles and to the wrists in a somewhat variable manner.  Vibratory and position sense testing was intact.  I could not auscultate cervical bruits.  He had a regular cardiac rhythm.  His lungs were clear to auscultation.      IMPRESSION:   1.  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   2.  Chronic left foot drop.   3.  Right scapular winging.      I suspect the patient has chronic underlying polyneuropathy.  I cannot tell he does not have a mononeuritis multiplex superimposed.  I have recommended that he have EMG testing and to be seen by our neuromuscular expert, Dr. Duarte.  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  The patient does need to continue physical therapy.  I have cautioned him about crossing his legs.  I told him I would be happy to see him again but I suspect that his care will now be under Dr. Duarte.  I have added further blood tests to be done including paraneoplastic panel and 24-hour urine for electrophoresis for monoclonal gammopathy.        Sincerely yours,      Dejan Garcia MD       I spent 1 hour with the patient.  Over 50% of the time this involved counseling and coordination of care.  A complete review of medical systems was done and a positive review is listed in the report above.         DEJAN GARCIA MD              D: 2018   T: 2018   MT: BONNY      Name:     LEROY MACHADO   MRN:      [id number]        Account:      [id number]   :      1935           Service Date: 2018      Document: [id number]

## 2018-04-02 NOTE — MR AVS SNAPSHOT
After Visit Summary   4/2/2018    Noe Florence    MRN: [id number]           Patient Information     Date Of Birth          1935        Visit Information        Provider Department      4/2/2018 10:45 AM Francisco Gilliam MD [facility]        Today's Diagnoses     Malignant neoplasm of colon, unspecified part of colon (H)    -  1       Follow-ups after your visit        Follow-up notes from your care team     Return in about 6 months (around 10/2/2018) for NP Visit with CT and labs.      Your next 10 appointments already scheduled     Apr 03, 2018 11:15 AM CDT   (Arrive by 11:00 AM)   Neuro Treatment with [facility] ([facility] [place])    [address] [place] [address]   [phone]            Apr 03, 2018  1:00 PM CDT   Treatment 60 with Courtney Meehan OT   [facility]ational [facility] ([facility])    [address] [place] [address]   [phone]            Apr 10, 2018 11:00 AM CDT   EMG with Jase Duarte MD   [facility] NEUROSPECIALTIES ([facility])    [address]  [address]  [facility] [address]   [phone]            Apr 13, 2018  9:00 AM CDT   (Arrive by 8:45 AM)   Neuro Treatment with [facility] ([facility])    [address] [place] [address]   [phone]            Apr 17, 2018 11:15 AM CDT   (Arrive by 11:00 AM)   Neuro Treatment with [facility] ([facility])    [address] [place] [address]   [phone]            Apr 20, 2018  8:30 AM CDT   (Arrive by 8:15 AM)   Neuro Treatment with [facility] ([facility] [place])    [address] [place] [address]   [phone]            Apr 24, 2018 11:15 AM  CDT   (Arrive by 11:00 AM)   Neuro Treatment with Rica Bauman PT   [facility] ([facility])    [address]  4th Floor  [facility] [address] [phone]            Apr 27, 2018  8:30 AM CDT   (Arrive by 8:15 AM)   Neuro Treatment with Rica Bauman PT   [facility] ([facility])    [address]  4th [address]  [facility] [address] [phone]            May 21, 2018  3:30 PM CDT   (Arrive by 3:15 PM)   Return Visit with Lashawn Jackson MD   [facility] Dermatology ([facility])    [address]  3rd Floor  [facility] [address] [phone]            May 29, 2018  4:00 PM CDT   (Arrive by 3:45 PM)   Return Vascular Visit with Frida Desai MD   [facility] Heart Care ([facility])    [address]  [address]  [facility] [address] [phone]              Future tests that were ordered for you today     Open Future Orders        Priority Expected Expires Ordered    CT Chest Abdomen Pelvis w/o & w Contrast Routine 10/2/2018 12/2/2018 4/2/2018    Comprehensive metabolic panel Routine 10/2/2018 12/2/2018 4/2/2018    CBC with platelets differential Routine 10/2/2018 12/2/2018 4/2/2018    CEA Routine 10/2/2018 12/2/2018 4/2/2018            Who to contact     If you have questions or need follow up information about today's clinic visit or your schedule please contact [facility] directly at [phone].  Normal or non-critical lab and imaging results will be communicated to you by MyChart, letter or phone within 4 business days after the clinic has received the results. If you do not hear from us within 7 days, please contact the clinic through MyChart or phone. If you have a critical or abnormal lab result, we will notify you by phone as soon as possible.  Submit refill requests through [employer] or call your pharmacy and they will  "forward the refill request to us. Please allow 3 business days for your refill to be completed.          Additional Information About Your Visit        [employer] Information     [employer] gives you secure access to your electronic health record. If you see a primary care provider, you can also send messages to your care team and make appointments. If you have questions, please call your primary care clinic.  If you do not have a primary care provider, please call [phone] and they will assist you.        Care EveryWhere ID     This is your Care EveryWhere ID. This could be used by other organizations to access your [facility] medical records  [id number]        Your Vitals Were     Pulse Temperature Respirations Height Pulse Oximetry BMI (Body Mass Index)    63 97.2  F (36.2  C) (Tympanic) 18 1.702 m (5' 7.01\") 100% 22.85 kg/m2       Blood Pressure from Last 3 Encounters:   04/02/18 119/62   03/27/18 124/57   03/23/18 125/74    Weight from Last 3 Encounters:   04/02/18 66.2 kg (145 lb 14.4 oz)   03/27/18 66.8 kg (147 lb 3.2 oz)   03/23/18 65.1 kg (143 lb 8 oz)               Primary Care Provider Office Phone # Fax #    Roberto Fabiano Sarmiento [phone]       [address] [place] [address]        Equal Access to Services     LEE CASTILLO AH: Hadii giovanny salinaso Solali, waaxda luqadaha, qaybta kaalmada aicha, isl burden . So [place] [phone].    ATENCIÓN: Si habla español, tiene a aguirre disposición servicios gratuitos de asistencia lingüística. Guillerminaame al [phone].    We comply with applicable federal civil rights laws and [place] laws. We do not discriminate on the basis of race, color, national origin, age, disability, sex, sexual orientation, or gender identity.            Thank you!     Thank you for choosing [facility]  for your care. Our goal is always to provide you with excellent care. Hearing back from our patients is " one way we can continue to improve our services. Please take a few minutes to complete the written survey that you may receive in the mail after your visit with us. Thank you!             Your Updated Medication List - Protect others around you: Learn how to safely use, store and throw away your medicines at www.disposemymeds.org.          This list is accurate as of 4/2/18 11:59 PM.  Always use your most recent med list.                   Brand Name Dispense Instructions for use Diagnosis    aspirin 81 MG tablet      Take 1 tablet by mouth daily.        atorvastatin 40 MG tablet    LIPITOR    100 tablet    Take 1 tablet (40 mg) by mouth daily    Hyperlipidemia, unspecified hyperlipidemia type       bacitracin ointment     28 g    Apply topically 2 times daily APPLY TO LEFT LEG TWO TIMES PER DAY    Left leg cellulitis       Blood Pressure Monitor Kit     1 kit    1 Units daily    Hypertension       CENTRUM SILVER per tablet      Take 1 tablet by mouth daily. AM        ciclopirox 0.77 % cream    LOPROX    90 g    Apply topically 2 times daily To feet and toenails.    Dermatophytosis of nail, Tinea pedis of both feet       cyanocobalamin 1000 MCG tablet    vitamin  B-12    180 tablet    Take 2 tablets (2,000 mcg) by mouth daily    Anemia       doxazosin 2 MG tablet    CARDURA    90 tablet    Take 1/2 tab in the morning and 1/2 tab in the evening    Essential hypertension       fluticasone 50 MCG/ACT spray    FLONASE    3 Package    Spray 2 sprays into both nostrils daily    Unspecified sinusitis (chronic)       ketoconazole 2 % cream    NIZORAL    60 g    Apply topically daily On hold    Tinea corporis       loratadine 10 MG tablet    CLARITIN     Take 10 mg by mouth as needed        metoprolol tartrate 25 MG tablet    LOPRESSOR    180 tablet    Take 1 tablet (25 mg) by mouth 2 times daily    Coronary artery disease involving native heart without angina pectoris, unspecified vessel or lesion type       mirtazapine 15  MG tablet    REMERON     Take 15 mg by mouth At Bedtime.        order for DME     1 Units    20-30 mm Hg knee length compression stockings.  Measure and fit.  Style and color per patient preference.  Pat Hsu per patient need.    PVD (peripheral vascular disease) (H), Ulcer of left lower extremity, limited to breakdown of skin (H)       triamcinolone 0.1 % cream    KENALOG    80 g    Apply topically 2 times daily For up to two weeks in a row    Atopic eczema       warfarin 5 MG tablet    COUMADIN    35 tablet    7.5 mg on Wed; 5 mg all other days  or as instructed by coumadin clinic.    Atrial flutter (H)       ZOLOFT 100 MG tablet   Generic drug:  sertraline      Take 100 mg by mouth every evening.

## 2018-04-03 ENCOUNTER — THERAPY VISIT (OUTPATIENT)
Dept: PHYSICAL THERAPY | Facility: CLINIC | Age: 83
End: 2018-04-03
Payer: COMMERCIAL

## 2018-04-03 ENCOUNTER — CARE COORDINATION (OUTPATIENT)
Dept: NEUROLOGY | Facility: CLINIC | Age: 83
End: 2018-04-03

## 2018-04-03 ENCOUNTER — THERAPY VISIT (OUTPATIENT)
Dept: OCCUPATIONAL THERAPY | Facility: CLINIC | Age: 83
End: 2018-04-03
Payer: COMMERCIAL

## 2018-04-03 DIAGNOSIS — Z78.9 IMPAIRED ACTIVITIES OF DAILY LIVING: Primary | ICD-10-CM

## 2018-04-03 DIAGNOSIS — M62.81 GENERALIZED MUSCLE WEAKNESS: ICD-10-CM

## 2018-04-03 DIAGNOSIS — M21.372 LEFT FOOT DROP: ICD-10-CM

## 2018-04-03 DIAGNOSIS — R26.81 GAIT INSTABILITY: Primary | ICD-10-CM

## 2018-04-03 DIAGNOSIS — Z91.81 AT HIGH RISK FOR FALLS: ICD-10-CM

## 2018-04-03 DIAGNOSIS — M40.00 ACQUIRED POSTURAL KYPHOSIS: ICD-10-CM

## 2018-04-03 LAB
MAG IGM SER IA-ACNC: 0 TU (ref 0–999)
SGPG IGM SER-ACNC: 0.26 IV (ref 0–0.99)

## 2018-04-03 NOTE — PROGRESS NOTES
Jose, MD Ben Elias Tracey, RN                   My tests normal or negative; cont to follow up with Felipe our peripheral nerve expert.              4/3/18:  Left a vm with the above information per Dr. Garcia.

## 2018-04-10 ENCOUNTER — OFFICE VISIT (OUTPATIENT)
Dept: NEUROLOGY | Facility: CLINIC | Age: 83
End: 2018-04-10
Payer: COMMERCIAL

## 2018-04-10 DIAGNOSIS — G56.01 CARPAL TUNNEL SYNDROME OF RIGHT WRIST: Primary | ICD-10-CM

## 2018-04-10 DIAGNOSIS — G60.3 IDIOPATHIC PROGRESSIVE POLYNEUROPATHY: ICD-10-CM

## 2018-04-10 DIAGNOSIS — M54.17 L-S RADICULOPATHY: ICD-10-CM

## 2018-04-10 NOTE — LETTER
4/10/2018       RE: Noe Florence  [address] [facility] [address]     Dear Colleague,    Thank you for referring your patient, Noe Florence, to the [facility] NEUROSPECIALTIES at [facility]. Please see a copy of my visit note below.        [facility]  Electrodiagnostic Laboratory    Nerve Conduction & EMG Report          Patient:       Noe Florence  Patient ID:    [id number]  Gender:        Male  YOB: 1935  Age:           82 Years 10 Months        History & Examination:  82 year old man with left foot drop for many years and poor balance. Eval for polyneuropathy vs focal neuropathy vs radiculopathy.     Techniques: Motor  And sensory conduction studies were done with surface recording electrodes. EMG was done with a concentric needle electrode.      Results:  Nerve conduction studies:  1. Bilateral sural sensory responses are absent.   2. Right median-D2 and ulnar-D5 sensory responses show moderately reduced amplitudes and mild to moderately slowed CV.   3. Right radial sensory response is normal.   4. Right peroneal-EDB motor response is absent.   5. Left peroneal-EDB motor response shows normal DL, severely reduced amplitude and normal CV.  6. Left peroneal-TA motor response show severely reduced amplitude and normal CV.   7. Right peroneal-TA motor response show moderately reduced amplitude and normal CV.   8. Bilateral tibial-AH motor responses show severely reduced amplitudes and normal CV. The right DL is slightly prolonged.   9. Right median-APB motor response shows mildly prolonged DL, mild to moderately reduced amplitude and normal CV in the forearm.   10. Right ulnar-ADM motor response shows normal DL, normal amplitude and moderate CV slowing across the elbow.    Needle EM. Fibrillation potentials and positive sharp waves were seen in the left VL, TA, and gastrocnemius muscles.   2. Large amplitude and/or long duration motor  unit potentials (MUP) were seen in the bilateral VL, bilateral TA, and bilateral gastrocnemius muscles.   3. Rapidly firing MUPs with reduced recruitment were seen in the left VL, bilateral TA, and left gastrocnemius muscles.   4. Proximal limb and paraspinal muscles were not sampled due to anticoagulation therapy (coumadin).    Interpretation:  This is an abnormal study. There is electrophysiologic evidence of:  1) A length-dependant, axonal, sensory motor polyneuropathy. Due to superimposed findings the severity of the polyneuropathy is difficulty to estimate, but would be considered no greater than moderate.  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 Localization to the nerve root is most likely, but due to study limitations (coumadin) proximal lower limb and paraspinal muscles could not be sampled. As such, this study is unable to localize the needle EMG changes to the lumbosacral roots with certainty.   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 Localization to the nerve root is most likely, but due to study limitations (coumadin) proximal lower limb and paraspinal muscles could not be sampled. As such, this study is unable to localize the needle EMG changes to the lumbosacral roots with certainty.     See additional comment. Clinical correlation is recommended.     Comment:  Comparison is made to a prior study dated 6/10/2014. Compared to the prior study sural sensory responses are now absent. Peroneal and tibial motor responses are probably unchanged (peroneal motor responses differences noted). These finding suggests slow worsening of the sensory portion of the polyneuropathy. There is no evidence of a demyelinating polyneuropathy on the basis of this study. This study was also performed to  address the possibility of a peroneal neuropathy. Although a peroneal neuropathy on the left cannot be exclude, the presence of the superimposed radiculopathy and polyneuropathy preclude the ability to identify a separate distinct peroneal nerve lesion. Note is also made of interval development of a marked degree of active denervating changes in multiple left lower limb muscles. This finding suggests the development of a new nerve lesion since the 2014 study, most likely (but not definitely) localized to the L4-S1 nerve roots.     Jase Duarte MD  Department of Neurology        Sensory NCS      Nerve / Sites Rec. Site Onset Peak NP Amp Ref. PP Amp Dist Gabe Ref. Temp     ms ms  V  V  V cm m/s m/s  C   R MEDIAN - Dig II Anti      Wrist Dig II 3.23 4.11 6.7 10.0 11.1 14 43.4 48.0 32.4   R ULNAR - Dig V Anti      Wrist Dig V 2.81 3.49 4.3 8.0 6.0 12.5 44.4 48.0 32.4   R RADIAL - Snuff      Forearm Snuff 1.88 2.34 22.1 15.0 19.0 10 53.3 48.0 32.4   L SURAL - Lat Mall 60      Calf Ankle NR NR NR 5.0 NR 14 NR 38.0 31.5   R SURAL - Lat Mall 60      Calf Ankle NR NR NR 5.0 NR 14 NR 38.0 31.6       Motor NCS      Nerve / Sites Rec. Site Lat Ref. Amp Ref. Rel Amp Dist Gabe Ref. Dur. Area Temp.     ms ms mV mV % cm m/s m/s ms %  C   R MEDIAN - APB      Wrist APB 4.84 4.40 3.7 5.0 100 8   7.03 100 32.5      Elbow APB 9.90  2.7  75 25 49.5 48.0 7.34 81.6 32.4   R ULNAR - ADM      Wrist ADM 3.02 3.50 9.0 5.0 100 8   7.66 100 33      B.Elbow ADM 7.14  7.6  84.3 22 53.5 48.0 7.92 102 33      A.Elbow ADM 9.27  7.1  79 8 37.5 48.0 8.07 101 33.1   L DEEP PERONEAL - EDB 60      Ankle EDB 4.58 6.00 0.4 2.0 100 8   6.09 100 31.3      FibHead EDB 12.60  0.4  124 32 39.9 38.0 6.35 96.9 31.3      Pop Fos EDB 14.95  0.4  102 9 38.4 38.0 6.77 108 31.3   R DEEP PERONEAL - EDB 60      Ankle EDB NR 6.00 NR 2.0 NR 8   NR NR 27.5   L TIBIAL - AH      Ankle AH 4.64 6.00 0.2 4.0 100 8   4.01 100 31.3      Pop Fos AH 13.07  0.1  73.5 37 43.9 38.0  13.23 345 31.3   R TIBIAL - AH      Ankle AH 6.51 6.00 0.2 4.0 100 8   12.71 100 32.7      Pop Fos AH 16.04  0.1  49 38 39.9 38.0 41.20 237 32.9   L PERONEAL - Tib Ant      Fib Head Tib Ant 2.76  0.6  100 11   7.29 100 27.8      Knee Tib Ant 4.79  0.6  96.3 8 39.4  8.13 110 31.5   R PERONEAL - Tib Ant      Fib Head Tib Ant 4.43  2.5  100    12.24 100 32.6      Knee Tib Ant 6.51  2.3  90.6 9 43.2  12.34 80.2 33.3       EMG Summary Table     Spontaneous MUAP Recruitment    IA Fib PSW Fasc H.F. Amp Dur. PPP Pattern   R. VAST LATERALIS N None None None None 2+ 1+ N N   R. TIB ANTERIOR N None None None None 2+ N N Mildly Reduced   R. GASTROCN (MED) N None None None None 2+ 1+ N N   L. VAST LATERALIS Increased 3+ 3+ None None 2+ 2+ N Moderately Reduced   L. TIB ANTERIOR Increased 2+ 2+ None None 1+ 3+ 2+ Discrete   L. GASTROCN (MED) Increased 2+ 2+ None None 1+ 2+ N Moderately Reduced                                              The NCS/EMG showed a length dependant polyneuropathy and probably L4-S1 radiuclopathies. The radiuclopathies were a bit hard to say for sure as the study was limited (coumadin). I suppose plexus or even multiple mononeuropathy localization is possible, but far less likely. There is nothing on the study that looks like a demyelinating neuropathy. I spoke with him about the results.   Presently he has no pain in his back, hips or legs. This makes vasculitic neuropathies improbably. The clinical course is also not very compelling for vasculitic processes. Should he develop worsening then may need to explore these further. At some point it may be helpful to repeat his lower back imaging. 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 Last CT of his LS spine was performed in 2015. If he develops low back pain or there is worsening of his leg strength obtaining a CT myelogram. For now will stay the course with supportive management and clinical surveillance.     Sincerely,    Jase Duarte MD

## 2018-04-10 NOTE — PROGRESS NOTES
[facility]  Electrodiagnostic Laboratory    Nerve Conduction & EMG Report          Patient:       Noe Florence  Patient ID:    [id number]  Gender:        Male  YOB: 1935  Age:           82 Years 10 Months        History & Examination:  82 year old man with left foot drop for many years and poor balance. Eval for polyneuropathy vs focal neuropathy vs radiculopathy.     Techniques: Motor  And sensory conduction studies were done with surface recording electrodes. EMG was done with a concentric needle electrode.      Results:  Nerve conduction studies:  1. Bilateral sural sensory responses are absent.   2. Right median-D2 and ulnar-D5 sensory responses show moderately reduced amplitudes and mild to moderately slowed CV.   3. Right radial sensory response is normal.   4. Right peroneal-EDB motor response is absent.   5. Left peroneal-EDB motor response shows normal DL, severely reduced amplitude and normal CV.  6. Left peroneal-TA motor response show severely reduced amplitude and normal CV.   7. Right peroneal-TA motor response show moderately reduced amplitude and normal CV.   8. Bilateral tibial-AH motor responses show severely reduced amplitudes and normal CV. The right DL is slightly prolonged.   9. Right median-APB motor response shows mildly prolonged DL, mild to moderately reduced amplitude and normal CV in the forearm.   10. Right ulnar-ADM motor response shows normal DL, normal amplitude and moderate CV slowing across the elbow.    Needle EM. Fibrillation potentials and positive sharp waves were seen in the left VL, TA, and gastrocnemius muscles.   2. Large amplitude and/or long duration motor unit potentials (MUP) were seen in the bilateral VL, bilateral TA, and bilateral gastrocnemius muscles.   3. Rapidly firing MUPs with reduced recruitment were seen in the left VL, bilateral TA, and left gastrocnemius muscles.   4. Proximal limb and paraspinal muscles were not sampled  due to anticoagulation therapy (coumadin).    Interpretation:  This is an abnormal study. There is electrophysiologic evidence of:  1) A length-dependant, axonal, sensory motor polyneuropathy. Due to superimposed findings the severity of the polyneuropathy is difficulty to estimate, but would be considered no greater than moderate.  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 Localization to the nerve root is most likely, but due to study limitations (coumadin) proximal lower limb and paraspinal muscles could not be sampled. As such, this study is unable to localize the needle EMG changes to the lumbosacral roots with certainty.   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 Localization to the nerve root is most likely, but due to study limitations (coumadin) proximal lower limb and paraspinal muscles could not be sampled. As such, this study is unable to localize the needle EMG changes to the lumbosacral roots with certainty.     See additional comment. Clinical correlation is recommended.     Comment:  Comparison is made to a prior study dated 6/10/2014. Compared to the prior study sural sensory responses are now absent. Peroneal and tibial motor responses are probably unchanged (peroneal motor responses differences noted). These finding suggests slow worsening of the sensory portion of the polyneuropathy. There is no evidence of a demyelinating polyneuropathy on the basis of this study. This study was also performed to address the possibility of a peroneal neuropathy. Although a peroneal neuropathy on the left cannot be exclude, the presence of the superimposed radiculopathy and polyneuropathy preclude the ability to identify a separate distinct peroneal nerve lesion. Note is also made of interval  development of a marked degree of active denervating changes in multiple left lower limb muscles. This finding suggests the development of a new nerve lesion since the 2014 study, most likely (but not definitely) localized to the L4-S1 nerve roots.     Jase Duarte MD  Department of Neurology        Sensory NCS      Nerve / Sites Rec. Site Onset Peak NP Amp Ref. PP Amp Dist Gabe Ref. Temp     ms ms  V  V  V cm m/s m/s  C   R MEDIAN - Dig II Anti      Wrist Dig II 3.23 4.11 6.7 10.0 11.1 14 43.4 48.0 32.4   R ULNAR - Dig V Anti      Wrist Dig V 2.81 3.49 4.3 8.0 6.0 12.5 44.4 48.0 32.4   R RADIAL - Snuff      Forearm Snuff 1.88 2.34 22.1 15.0 19.0 10 53.3 48.0 32.4   L SURAL - Lat Mall 60      Calf Ankle NR NR NR 5.0 NR 14 NR 38.0 31.5   R SURAL - Lat Mall 60      Calf Ankle NR NR NR 5.0 NR 14 NR 38.0 31.6       Motor NCS      Nerve / Sites Rec. Site Lat Ref. Amp Ref. Rel Amp Dist Gabe Ref. Dur. Area Temp.     ms ms mV mV % cm m/s m/s ms %  C   R MEDIAN - APB      Wrist APB 4.84 4.40 3.7 5.0 100 8   7.03 100 32.5      Elbow APB 9.90  2.7  75 25 49.5 48.0 7.34 81.6 32.4   R ULNAR - ADM      Wrist ADM 3.02 3.50 9.0 5.0 100 8   7.66 100 33      B.Elbow ADM 7.14  7.6  84.3 22 53.5 48.0 7.92 102 33      A.Elbow ADM 9.27  7.1  79 8 37.5 48.0 8.07 101 33.1   L DEEP PERONEAL - EDB 60      Ankle EDB 4.58 6.00 0.4 2.0 100 8   6.09 100 31.3      FibHead EDB 12.60  0.4  124 32 39.9 38.0 6.35 96.9 31.3      Pop Fos EDB 14.95  0.4  102 9 38.4 38.0 6.77 108 31.3   R DEEP PERONEAL - EDB 60      Ankle EDB NR 6.00 NR 2.0 NR 8   NR NR 27.5   L TIBIAL - AH      Ankle AH 4.64 6.00 0.2 4.0 100 8   4.01 100 31.3      Pop Fos AH 13.07  0.1  73.5 37 43.9 38.0 13.23 345 31.3   R TIBIAL - AH      Ankle AH 6.51 6.00 0.2 4.0 100 8   12.71 100 32.7      Pop Fos AH 16.04  0.1  49 38 39.9 38.0 41.20 237 32.9   L PERONEAL - Tib Ant      Fib Head Tib Ant 2.76  0.6  100 11   7.29 100 27.8      Knee Tib Ant 4.79  0.6  96.3 8 39.4  8.13 110 31.5   R  PERONEAL - Tib Ant      Fib Head Tib Ant 4.43  2.5  100    12.24 100 32.6      Knee Tib Ant 6.51  2.3  90.6 9 43.2  12.34 80.2 33.3       EMG Summary Table     Spontaneous MUAP Recruitment    IA Fib PSW Fasc H.F. Amp Dur. PPP Pattern   R. VAST LATERALIS N None None None None 2+ 1+ N N   R. TIB ANTERIOR N None None None None 2+ N N Mildly Reduced   R. GASTROCN (MED) N None None None None 2+ 1+ N N   L. VAST LATERALIS Increased 3+ 3+ None None 2+ 2+ N Moderately Reduced   L. TIB ANTERIOR Increased 2+ 2+ None None 1+ 3+ 2+ Discrete   L. GASTROCN (MED) Increased 2+ 2+ None None 1+ 2+ N Moderately Reduced

## 2018-04-10 NOTE — PROGRESS NOTES
The NCS/EMG showed a length dependant polyneuropathy and probably L4-S1 radiuclopathies. The radiuclopathies were a bit hard to say for sure as the study was limited (coumadin). I suppose plexus or even multiple mononeuropathy localization is possible, but far less likely. There is nothing on the study that looks like a demyelinating neuropathy. I spoke with him about the results.   Presently he has no pain in his back, hips or legs. This makes vasculitic neuropathies improbably. The clinical course is also not very compelling for vasculitic processes. Should he develop worsening then may need to explore these further. At some point it may be helpful to repeat his lower back imaging. 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 Last CT of his LS spine was performed in 2015. If he develops low back pain or there is worsening of his leg strength obtaining a CT myelogram. For now will stay the course with supportive management and clinical surveillance.

## 2018-04-10 NOTE — MR AVS SNAPSHOT
After Visit Summary   4/10/2018    Noe Florence    MRN: [id number]           Patient Information     Date Of Birth          1935        Visit Information        Provider Department      4/10/2018 11:00 AM Jase Duarte MD [facility] NEUROSPECIALTIES        Today's Diagnoses     Carpal tunnel syndrome of right wrist    -  1    L-S radiculopathy        Idiopathic progressive polyneuropathy           Follow-ups after your visit        Your next 10 appointments already scheduled     Apr 13, 2018  9:00 AM CDT   (Arrive by 8:45 AM)   Neuro Treatment with Rica Bauman PT   [facility] ([facility])    [address]  4th Floor  [facility] [address]   [phone]            Apr 17, 2018 11:15 AM CDT   (Arrive by 11:00 AM)   Neuro Treatment with Rica Bauman PT   [facility] ([facility])    [address]  4th Floor  [facility] [address]   [phone]            Apr 24, 2018 11:15 AM CDT   (Arrive by 11:00 AM)   Neuro Treatment with Rica Bauman PT   [facility] ([facility])    [address]  4th Floor  [facility] [address]   [phone]            May 21, 2018  3:30 PM CDT   (Arrive by 3:15 PM)   Return Visit with Lashawn Jackson MD   [facility] ([facility])    [address]  3rd Floor  [facility] [address]   [phone]            May 29, 2018  4:00 PM CDT   (Arrive by 3:45 PM)   Return Vascular Visit with Frida Desai MD   [facility] ([facility])    [address]  [address] [place] [address]   [phone]            Jun 04, 2018  9:00 AM CDT   (Arrive by 8:45 AM)   Implanted Defibulator with Uc Cv Device 1   [facility] ([facility])    [address] [place] [address]  [address] [place] [address]   [phone]            Jul 03, 2018  12:30 PM CDT   Return Neuropathy Visit with Jase Duarte MD   [facility] NEUROSPECIALTIES ([facility])    [address]  [address]  [facility] [address]   [phone]            Aug 06, 2018 10:00 AM CDT   (Arrive by 9:45 AM)   Return Visit with Roberto Sarmiento MD   [facility] ([facility])    [address]  4th Floor  [facility] [address] [phone]            Oct 01, 2018  9:45 AM CDT   Lab with  LAB   [facility] ([facility])    [address]  1st Floor  [facility] [address] [phone]            Oct 01, 2018 10:20 AM CDT   (Arrive by 10:05 AM)   CT CHEST ABDOMEN PELVIS W/O & W CONTRAST with UCCT1   [facility] ([facility])    [address]  1st Floor  [facility] [address] [phone]           Please bring any scans or X-rays taken at other hospitals, if similar tests were done. Also bring a list of your medicines, including vitamins, minerals and over-the-counter drugs. It is safest to leave personal items at home.  Be sure to tell your doctor:   If you have any allergies.   If there s any chance you are pregnant.   If you are breastfeeding.  You may have contrast for this exam. To prepare:   Do not eat or drink for 2 hours before your exam. If you need to take medicine, you may take it with small sips of water. (We may ask you to take liquid medicine as well.)   The day before your exam, drink extra fluids at least six 8-ounce glasses (unless your doctor tells you to restrict your fluids).   You will be given instructions on how to drink the contrast.  Patients over 70 or patients with diabetes or kidney problems:   If you haven t had a blood test (creatinine test) within the last 30 days, the Cardiologist/Radiologist may require you to get this test prior to your exam.  If you have diabetes:   Continue to take your  metformin medication on the day of your exam  Please wear loose clothing, such as a sweat suit or jogging clothes. Avoid snaps, zippers and other metal. We may ask you to undress and put on a hospital gown.  If you have any questions, please call the Imaging Department where you will have your exam.              Who to contact     Please call your clinic at [phone] to:    Ask questions about your health    Make or cancel appointments    Discuss your medicines    Learn about your test results    Speak to your doctor            Additional Information About Your Visit        [employer] Information     [employer] gives you secure access to your electronic health record. If you see a primary care provider, you can also send messages to your care team and make appointments. If you have questions, please call your primary care clinic.  If you do not have a primary care provider, please call [phone] and they will assist you.      [employer] is an electronic gateway that provides easy, online access to your medical records. With [employer], you can request a clinic appointment, read your test results, renew a prescription or communicate with your care team.     To access your existing account, please contact your [facility] or call [phone] for assistance.        Care EveryWhere ID     This is your Care EveryWhere ID. This could be used by other organizations to access your [facility] medical records  [id number]         Blood Pressure from Last 3 Encounters:   04/02/18 119/62   03/27/18 124/57   03/23/18 125/74    Weight from Last 3 Encounters:   04/02/18 66.2 kg (145 lb 14.4 oz)   03/27/18 66.8 kg (147 lb 3.2 oz)   03/23/18 65.1 kg (143 lb 8 oz)              We Performed the Following     HC NCS MOTOR W OR W/O F-WAVE, 13 OR MORE     HC NEEDLE EMG EA EXTREMITY W/PARASPINAL AREA LIMITED        Primary Care Provider Office Phone # Fax #    Roberto Sarmiento [phone]        [address]  [facility] [address]        Equal Access to Services     LEE CASTILLO : Hadii aad ku hadtiffanykishore Sheryllali, wakerryda luarcenioadaha, qaybta kacarlosvalerie holcomb, sil wilcoxdafnemc minor. So [facility] [phone].    ATENCIÓN: Si habla español, tiene a aguirre disposición servicios gratuitos de asistencia lingüística. Llame al [phone].    We comply with applicable federal civil rights laws and [place] laws. We do not discriminate on the basis of race, color, national origin, age, disability, sex, sexual orientation, or gender identity.            Thank you!     Thank you for choosing [facility] NEUROSPECIALTIES  for your care. Our goal is always to provide you with excellent care. Hearing back from our patients is one way we can continue to improve our services. Please take a few minutes to complete the written survey that you may receive in the mail after your visit with us. Thank you!             Your Updated Medication List - Protect others around you: Learn how to safely use, store and throw away your medicines at www.disposemymeds.org.          This list is accurate as of 4/10/18  1:00 PM.  Always use your most recent med list.                   Brand Name Dispense Instructions for use Diagnosis    aspirin 81 MG tablet      Take 1 tablet by mouth daily.        atorvastatin 40 MG tablet    LIPITOR    100 tablet    Take 1 tablet (40 mg) by mouth daily    Hyperlipidemia, unspecified hyperlipidemia type       bacitracin ointment     28 g    Apply topically 2 times daily APPLY TO LEFT LEG TWO TIMES PER DAY    Left leg cellulitis       Blood Pressure Monitor Kit     1 kit    1 Units daily    Hypertension       CENTRUM SILVER per tablet      Take 1 tablet by mouth daily. AM        ciclopirox 0.77 % cream    LOPROX    90 g    Apply topically 2 times daily To feet and toenails.    Dermatophytosis of nail, Tinea pedis of both feet       cyanocobalamin 1000 MCG tablet    vitamin  B-12    180 tablet     Take 2 tablets (2,000 mcg) by mouth daily    Anemia       doxazosin 2 MG tablet    CARDURA    90 tablet    Take 1/2 tab in the morning and 1/2 tab in the evening    Essential hypertension       fluticasone 50 MCG/ACT spray    FLONASE    3 Package    Spray 2 sprays into both nostrils daily    Unspecified sinusitis (chronic)       ketoconazole 2 % cream    NIZORAL    60 g    Apply topically daily On hold    Tinea corporis       loratadine 10 MG tablet    CLARITIN     Take 10 mg by mouth as needed        metoprolol tartrate 25 MG tablet    LOPRESSOR    180 tablet    Take 1 tablet (25 mg) by mouth 2 times daily    Coronary artery disease involving native heart without angina pectoris, unspecified vessel or lesion type       mirtazapine 15 MG tablet    REMERON     Take 15 mg by mouth At Bedtime.        order for DME     1 Units    20-30 mm Hg knee length compression stockings.  Measure and fit.  Style and color per patient preference.  Doff n Aracelis per patient need.    PVD (peripheral vascular disease) (H), Ulcer of left lower extremity, limited to breakdown of skin (H)       triamcinolone 0.1 % cream    KENALOG    80 g    Apply topically 2 times daily For up to two weeks in a row    Atopic eczema       warfarin 5 MG tablet    COUMADIN    35 tablet    7.5 mg on Wed; 5 mg all other days  or as instructed by coumadin clinic.    Atrial flutter (H)       ZOLOFT 100 MG tablet   Generic drug:  sertraline      Take 100 mg by mouth every evening.

## 2018-04-13 ENCOUNTER — THERAPY VISIT (OUTPATIENT)
Dept: PHYSICAL THERAPY | Facility: CLINIC | Age: 83
End: 2018-04-13
Payer: COMMERCIAL

## 2018-04-13 DIAGNOSIS — M21.372 LEFT FOOT DROP: ICD-10-CM

## 2018-04-13 DIAGNOSIS — Z91.81 AT HIGH RISK FOR FALLS: ICD-10-CM

## 2018-04-13 DIAGNOSIS — R26.81 GAIT INSTABILITY: Primary | ICD-10-CM

## 2018-04-13 DIAGNOSIS — M62.81 GENERALIZED MUSCLE WEAKNESS: ICD-10-CM

## 2018-04-13 DIAGNOSIS — M40.00 ACQUIRED POSTURAL KYPHOSIS: ICD-10-CM

## 2018-04-17 ENCOUNTER — THERAPY VISIT (OUTPATIENT)
Dept: PHYSICAL THERAPY | Facility: CLINIC | Age: 83
End: 2018-04-17
Payer: COMMERCIAL

## 2018-04-17 DIAGNOSIS — M62.81 GENERALIZED MUSCLE WEAKNESS: ICD-10-CM

## 2018-04-17 DIAGNOSIS — Z91.81 AT HIGH RISK FOR FALLS: ICD-10-CM

## 2018-04-17 DIAGNOSIS — M40.00 ACQUIRED POSTURAL KYPHOSIS: ICD-10-CM

## 2018-04-17 DIAGNOSIS — R26.81 GAIT INSTABILITY: Primary | ICD-10-CM

## 2018-04-17 DIAGNOSIS — M21.372 LEFT FOOT DROP: ICD-10-CM

## 2018-04-24 ENCOUNTER — THERAPY VISIT (OUTPATIENT)
Dept: PHYSICAL THERAPY | Facility: CLINIC | Age: 83
End: 2018-04-24
Payer: COMMERCIAL

## 2018-04-24 DIAGNOSIS — M21.372 LEFT FOOT DROP: ICD-10-CM

## 2018-04-24 DIAGNOSIS — Z91.81 AT HIGH RISK FOR FALLS: ICD-10-CM

## 2018-04-24 DIAGNOSIS — R26.81 GAIT INSTABILITY: Primary | ICD-10-CM

## 2018-04-24 DIAGNOSIS — M40.00 ACQUIRED POSTURAL KYPHOSIS: ICD-10-CM

## 2018-04-24 DIAGNOSIS — M62.81 GENERALIZED MUSCLE WEAKNESS: ICD-10-CM

## 2018-05-01 ENCOUNTER — THERAPY VISIT (OUTPATIENT)
Dept: PHYSICAL THERAPY | Facility: CLINIC | Age: 83
End: 2018-05-01
Payer: COMMERCIAL

## 2018-05-01 DIAGNOSIS — R26.81 GAIT INSTABILITY: Primary | ICD-10-CM

## 2018-05-01 DIAGNOSIS — M62.81 GENERALIZED MUSCLE WEAKNESS: ICD-10-CM

## 2018-05-01 DIAGNOSIS — Z91.81 AT HIGH RISK FOR FALLS: ICD-10-CM

## 2018-05-01 DIAGNOSIS — M40.00 ACQUIRED POSTURAL KYPHOSIS: ICD-10-CM

## 2018-05-01 DIAGNOSIS — M21.372 LEFT FOOT DROP: ICD-10-CM

## 2018-05-15 ENCOUNTER — THERAPY VISIT (OUTPATIENT)
Dept: PHYSICAL THERAPY | Facility: CLINIC | Age: 83
End: 2018-05-15
Payer: COMMERCIAL

## 2018-05-15 ENCOUNTER — ANTICOAGULATION THERAPY VISIT (OUTPATIENT)
Dept: ANTICOAGULATION | Facility: CLINIC | Age: 83
End: 2018-05-15

## 2018-05-15 DIAGNOSIS — M40.00 ACQUIRED POSTURAL KYPHOSIS: ICD-10-CM

## 2018-05-15 DIAGNOSIS — I48.0 PAROXYSMAL ATRIAL FIBRILLATION (H): ICD-10-CM

## 2018-05-15 DIAGNOSIS — Z79.01 LONG-TERM (CURRENT) USE OF ANTICOAGULANTS: ICD-10-CM

## 2018-05-15 DIAGNOSIS — R26.81 GAIT INSTABILITY: Primary | ICD-10-CM

## 2018-05-15 DIAGNOSIS — M62.81 GENERALIZED MUSCLE WEAKNESS: ICD-10-CM

## 2018-05-15 DIAGNOSIS — Z91.81 AT HIGH RISK FOR FALLS: ICD-10-CM

## 2018-05-15 DIAGNOSIS — M21.372 LEFT FOOT DROP: ICD-10-CM

## 2018-05-15 LAB — INR PPP: 3.19 (ref 0.86–1.14)

## 2018-05-15 NOTE — PROGRESS NOTES
ANTICOAGULATION FOLLOW-UP CLINIC VISIT    Patient Name:  Noe Florence  Date:  5/15/2018  Contact Type:  Telephone    SUBJECTIVE:        OBJECTIVE    INR   Date Value Ref Range Status   05/15/2018 3.19 (H) 0.86 - 1.14 Final       ASSESSMENT / PLAN  INR assessment THER    Recheck INR In: 1 WEEK    INR Location Clinic      Anticoagulation Summary as of 5/15/2018     INR goal 2.0-3.0   Today's INR 3.19!   Maintenance plan 5 mg (5 mg x 1) every day   Full instructions 5/15: 2.5 mg; Otherwise 5 mg every day   Weekly total 35 mg   Plan last modified Nguyen Zuniga RN (12/21/2017)   Next INR check 5/22/2018   Priority INR   Target end date Indefinite    Indications   Atrial fibrillation (H) [I48.91] [I48.91]  Long-term (current) use of anticoagulants [Z79.01] [Z79.01]         Anticoagulation Episode Summary     INR check location     Preferred lab     Send INR reminders to [facility]    Comments Patient contact number: [phone]   Can leave results with Rica (friend)      Anticoagulation Care Providers     Provider Role Specialty Phone number    Deedee Baird MD Responsible Cardiology [phone]            See the Encounter Report to view Anticoagulation Flowsheet and Dosing Calendar (Go to Encounters tab in chart review, and find the Anticoagulation Therapy Visit)    Left message for patient with results and dosing recommendations. Asked patient to call back to report any missed doses, falls, signs and symptoms of bleeding or clotting, any changes in health, medication, or diet. Asked patient to call back with any questions or concerns.     Nguyen Zuniga, RN

## 2018-05-15 NOTE — MR AVS SNAPSHOT
Noe Florence   5/15/2018   Anticoagulation Therapy Visit    Description:  82 year old male   Provider:  Nguyen Zuniga, RN   Department:  [facility]           INR as of 5/15/2018     Today's INR 3.19!      Anticoagulation Summary as of 5/15/2018     INR goal 2.0-3.0   Today's INR 3.19!   Full instructions 5/15: 2.5 mg; Otherwise 5 mg every day   Next INR check 5/22/2018    Indications   Atrial fibrillation (H) [I48.91] [I48.91]  Long-term (current) use of anticoagulants [Z79.01] [Z79.01]         May 2018 Details    Sun Mon Tue Wed Thu Fri Sat       1               2               3               4               5                 6               7               8               9               10               11               12                 13               14               15      2.5 mg   See details      16      5 mg         17      5 mg         18      5 mg         19      5 mg           20      5 mg         21      5 mg         22            23               24               25               26                 27               28               29               30               31                  Date Details   05/15 This INR check       Date of next INR:  5/22/2018         How to take your warfarin dose     To take:  2.5 mg Take 0.5 of a 5 mg tablet.    To take:  5 mg Take 1 of the 5 mg tablets.

## 2018-05-21 ENCOUNTER — OFFICE VISIT (OUTPATIENT)
Dept: DERMATOLOGY | Facility: CLINIC | Age: 83
End: 2018-05-21
Payer: COMMERCIAL

## 2018-05-21 VITALS — DIASTOLIC BLOOD PRESSURE: 79 MMHG | SYSTOLIC BLOOD PRESSURE: 120 MMHG | HEART RATE: 67 BPM

## 2018-05-21 DIAGNOSIS — B35.1 ONYCHOMYCOSIS: Primary | ICD-10-CM

## 2018-05-21 DIAGNOSIS — B35.3 TINEA PEDIS OF BOTH FEET: ICD-10-CM

## 2018-05-21 DIAGNOSIS — L82.0 INFLAMED SEBORRHEIC KERATOSIS: ICD-10-CM

## 2018-05-21 ASSESSMENT — PAIN SCALES - GENERAL: PAINLEVEL: NO PAIN (0)

## 2018-05-21 NOTE — PATIENT INSTRUCTIONS
Buy the followin) Amlactin 12% cream to use daily on your whole body  2) You can buy a device called L'applique that will help you apply the cream to your back    Doing this will help with the itchiness

## 2018-05-21 NOTE — LETTER
5/21/2018       RE: Noe Florence  [address] [facility] [address]     Dear Colleague,    Thank you for referring your patient, Noe Florence, to the [facility] DERMATOLOGY at [facility]. Please see a copy of my visit note below.    [facility] Dermatology Note      Dermatology Problem List:  1.  Seborrheic keratoses  2.  Stasis dermatitis  3.  Tinea pedis, bilateral  4.  Tinea corporis, 8/2013, 9/2015  5.  Tinea cruris, 8/2013    Encounter Date: May 21, 2018    CC:  Chief Complaint   Patient presents with     Derm Problem     Noe is here for his 3 month follow up.      History of Present Illness:  Mr. Noe Florence is a 82 year old male who presents as a follow-up for tinea pedis and inflamed SK's. The patient was last seen 2/16/218 when he was given terbinafine cream BID for his tinea pedis and had several of his innumerable SK's treated with cryotherapy. Today, he reports that the rash on his lower extremities has nearly resolved. He continues to have itchy papules on his back. He has been wearing compression stockings for venous stasis. Otherwise has been in his usual state of health and denies other skin concerns.     Past Medical History:   Patient Active Problem List   Diagnosis     Rotator cuff (capsule) sprain     Other postprocedural status(V45.89)     Hyperkalemia     Anemia     Diarrhea     Diverticulitis of colon     Hypertension     CAD (coronary artery disease)     NSVT (nonsustained ventricular tachycardia) (H)     NSTEMI (non-ST elevated myocardial infarction) (H)     Automatic implantable cardioverter-defibrillator - Medtronic dual chamber ICD - Not Dependent     Skin exam, screening for cancer     Tinea cruris     Tinea corporis     Colon cancer (H)     Polymyalgia rheumatica (H)     S/P ablation of atrial flutter     Primary open angle glaucoma     Atrial fibrillation (H) [I48.91]     Long-term (current) use of  anticoagulants [Z79.01]     Ischemic cardiomyopathy     CKD (chronic kidney disease), stage III     Weight loss, unintentional     Skin infection     Tinea pedis of both feet     Venous stasis dermatitis of both lower extremities     Actinic keratosis     Inflamed seborrheic keratosis     Past Medical History:   Diagnosis Date     Advanced open-angle glaucoma      Antiplatelet or antithrombotic long-term use      Atrial fibrillation (H)      CKD (chronic kidney disease), stage III 2005     Colon cancer (H)     Stage II-B colon cancer     Coronary artery disease     s/p CABG x 2, JEREMY x 2     Diverticulitis      Hyperlipidemia      Hypertension      Ischemic cardiomyopathy      MGUS (monoclonal gammopathy of unknown significance)      Nonsenile cataract     BE     Pacemaker      Polymorphic ventricular tachycardia (H)      Polymyalgia rheumatica (H)      PVD (posterior vitreous detachment), both eyes 2005     S/P ablation of atrial flutter 6/20/14    CTI     Stented coronary artery      SVT (supraventricular tachycardia) (H)     PPM/AICD for NSVT     Upper leg DVT (deep venous thromboembolism), chronic (H)     Left     Weight loss, unintentional 2017    15 lb in 4 months     Past Surgical History:   Procedure Laterality Date     C CABG, ARTERY-VEIN, FOUR  2006    LIMA-LAD, SVG-Rt PDA, SVG-OM2, SVG-Diag 1     C CABG, VEIN, SINGLE  1994    1-vessel CABG, SVG->PDRCA      CATARACT IOL, RT/LT Bilateral      COLONOSCOPY  3/13/2014    Procedure: COMBINED COLONOSCOPY, SINGLE BIOPSY/POLYPECTOMY BY BIOPSY;;  Surgeon: Mary Gerber MD;  Location:  GI     COLONOSCOPY N/A 6/22/2015    Procedure: COLONOSCOPY;  Surgeon: Marilin Newman MD;  Location:  GI     H ABLATION ATRIAL FLUTTER       HEART CATH DRUG ELUTING STENT PLACEMENT  4/19/2012    JEREMY x 2 to LCx     IMPLANT IMPLANTABLE CARDIOVERTER DEFIBRILLATOR  5-    ppm/aicd     KNEE SURGERY      right and left knee surgeries     LAPAROSCOPIC ASSISTED  COLECTOMY LEFT (DESCENDING)  4/8/2014    Procedure: LAPAROSCOPIC ASSISTED COLECTOMY LEFT (DESCENDING);  Hand assisted Laparoscopic Sigmoid Colectomy , Laparoscopic mobilization of spleenic flexure *Latex Allergy*Anesthesia General with Block;  Surgeon: Chanelle Guzmán MD;  Location: [facility] OR     REPAIR VALVE MITRAL  2006     30-mm Medtronic Julian ring      SELECTIVE LASER TRABECULOPLASTY (SLT) OD (RIGHT EYE)  4/10, 1/12,+1/9/13    RE     SELECTIVE LASER TRABECULOPLASTY (SLT) OS (LEFT EYE)  5/2012     SHOULDER SURGERY      right rotator cuff       Social History:  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    Family History:  There is no family history of skin cancer.    Medications:  Current Outpatient Prescriptions   Medication Sig Dispense Refill     aspirin 81 MG tablet Take 1 tablet by mouth daily.       atorvastatin (LIPITOR) 40 MG tablet Take 1 tablet (40 mg) by mouth daily 100 tablet 3     bacitracin ointment Apply topically 2 times daily APPLY TO LEFT LEG TWO TIMES PER DAY 28 g 0     Blood Pressure Monitor KIT 1 Units daily 1 kit 0     ciclopirox (LOPROX) 0.77 % cream Apply topically 2 times daily To feet and toenails. 90 g 6     cyanocobalamin (VITAMIN  B-12) 1000 MCG tablet Take 2 tablets (2,000 mcg) by mouth daily 180 tablet 3     doxazosin (CARDURA) 2 MG tablet Take 1/2 tab in the morning and 1/2 tab in the evening 90 tablet 3     fluticasone (FLONASE) 50 MCG/ACT nasal spray Spray 2 sprays into both nostrils daily 3 Package 0     ketoconazole (NIZORAL) 2 % cream Apply topically daily On hold 60 g 3     loratadine (CLARITIN) 10 MG tablet Take 10 mg by mouth as needed        metoprolol tartrate (LOPRESSOR) 25 MG tablet Take 1 tablet (25 mg) by mouth 2 times daily 180 tablet 3     mirtazapine (REMERON) 15 MG tablet Take 15 mg by mouth At Bedtime.       Multiple Vitamins-Minerals  (CENTRUM SILVER) per tablet Take 1 tablet by mouth daily. AM        order for DME 20-30 mm Hg knee length compression stockings.  Measure and fit.  Style and color per patient preference.  Pat Hsu per patient need. 1 Units 0     sertraline (ZOLOFT) 100 MG tablet Take 100 mg by mouth every evening.       triamcinolone (KENALOG) 0.1 % cream Apply topically 2 times daily For up to two weeks in a row 80 g 3     warfarin (COUMADIN) 5 MG tablet 7.5 mg on Wed; 5 mg all other days  or as instructed by coumadin clinic. 35 tablet 5     Allergies   Allergen Reactions     Latex Rash     Rash         Review of Systems:  -Skin Establ Pt: The patient denies any new rash, pruritus, or lesions that are symptomatic, changing or bleeding, except as per HPI.  -Constitutional: The patient denies fatigue, fevers, chills, and night sweats.  -HEENT: Patient denies nonhealing oral sores.  -Skin: As above in HPI. No additional skin concerns.    Physical exam:  Vitals: /79 (BP Location: Right arm, Patient Position: Chair, Cuff Size: Adult Regular)  Pulse 67  GEN: This is a well developed male in no acute distress, in a pleasant mood.    SKIN: Focused skin exam of the scalp, face, back and lower legs revealed the following  -Innumerable stuck on appearing, cerebriform and verrucous tan to brown papules and plaques on the scalp, face, and torso  -There is hypertrophy and yellow subungual debris of the toenails  -There are red-brown pinpoint macules on shins  -There is no interdigital or plantar scaling or erythema  -No other lesions of concern on areas examined.     Impression/Plan:  1. Stasis Dermatitis, resolved    Encouraged continued use of compression stockings    2. Tinea Pedis and Onychomycosis, resolved    Patient has cleared with terbinafine cream. Given his onychomycosis, should continue terbinafine cream TIW to prevent recurrence    3. Seborrheic Keratoses and inflammed seborrheic keratoses    Discussed the benign nature  of these lesions  Cryotherapy procedure note (performed with medical student participation): After verbal consent and discussion of risks and benefits including but no limited to dyspigmentation/scar, blister, infection, recurrence, and pain, 7 lesions were treated with 1-2mm freeze border for 2 cycles with liquid nitrogen. Post cryotherapy instructions were provided.    CC Dr. Roberto Sarmiento on close of this encounter.  Follow-up in 6 months, earlier for new or changing lesions.     Patient care seen and discussed with Dr. Manuel Alvarez MD  PGY5 Med-Derm  [phone]          Again, thank you for allowing me to participate in the care of your patient.      Sincerely,    Lashawn Jackson MD

## 2018-05-21 NOTE — NURSING NOTE
Dermatology Rooming Note    Noe Florence's goals for this visit include:   Chief Complaint   Patient presents with     Derm Problem     Noe is here for his 3 month follow up.      Yoel Weston

## 2018-05-21 NOTE — PROGRESS NOTES
[facility] Dermatology Note      Dermatology Problem List:  1.  Seborrheic keratoses  2.  Stasis dermatitis  3.  Tinea pedis, bilateral  4.  Tinea corporis, 8/2013, 9/2015  5.  Tinea cruris, 8/2013    Encounter Date: May 21, 2018    CC:  Chief Complaint   Patient presents with     Derm Problem     Noe is here for his 3 month follow up.      History of Present Illness:  Mr. Noe Florence is a 82 year old male who presents as a follow-up for tinea pedis and inflamed SK's. The patient was last seen 2/16/218 when he was given terbinafine cream BID for his tinea pedis and had several of his innumerable SK's treated with cryotherapy. Today, he reports that the rash on his lower extremities has nearly resolved. He continues to have itchy papules on his back. He has been wearing compression stockings for venous stasis. Otherwise has been in his usual state of health and denies other skin concerns.     Past Medical History:   Patient Active Problem List   Diagnosis     Rotator cuff (capsule) sprain     Other postprocedural status(V45.89)     Hyperkalemia     Anemia     Diarrhea     Diverticulitis of colon     Hypertension     CAD (coronary artery disease)     NSVT (nonsustained ventricular tachycardia) (H)     NSTEMI (non-ST elevated myocardial infarction) (H)     Automatic implantable cardioverter-defibrillator - Medtronic dual chamber ICD - Not Dependent     Skin exam, screening for cancer     Tinea cruris     Tinea corporis     Colon cancer (H)     Polymyalgia rheumatica (H)     S/P ablation of atrial flutter     Primary open angle glaucoma     Atrial fibrillation (H) [I48.91]     Long-term (current) use of anticoagulants [Z79.01]     Ischemic cardiomyopathy     CKD (chronic kidney disease), stage III     Weight loss, unintentional     Skin infection     Tinea pedis of both feet     Venous stasis dermatitis of both lower extremities     Actinic keratosis     Inflamed seborrheic keratosis     Past  Medical History:   Diagnosis Date     Advanced open-angle glaucoma      Antiplatelet or antithrombotic long-term use      Atrial fibrillation (H)      CKD (chronic kidney disease), stage III 2005     Colon cancer (H)     Stage II-B colon cancer     Coronary artery disease     s/p CABG x 2, JEREMY x 2     Diverticulitis      Hyperlipidemia      Hypertension      Ischemic cardiomyopathy      MGUS (monoclonal gammopathy of unknown significance)      Nonsenile cataract     BE     Pacemaker      Polymorphic ventricular tachycardia (H)      Polymyalgia rheumatica (H)      PVD (posterior vitreous detachment), both eyes 2005     S/P ablation of atrial flutter 6/20/14    CTI     Stented coronary artery      SVT (supraventricular tachycardia) (H)     PPM/AICD for NSVT     Upper leg DVT (deep venous thromboembolism), chronic (H)     Left     Weight loss, unintentional 2017    15 lb in 4 months     Past Surgical History:   Procedure Laterality Date     C CABG, ARTERY-VEIN, FOUR  2006    LIMA-LAD, SVG-Rt PDA, SVG-OM2, SVG-Diag 1     C CABG, VEIN, SINGLE  1994    1-vessel CABG, SVG->PDRCA      CATARACT IOL, RT/LT Bilateral      COLONOSCOPY  3/13/2014    Procedure: COMBINED COLONOSCOPY, SINGLE BIOPSY/POLYPECTOMY BY BIOPSY;;  Surgeon: Mary Gerber MD;  Location:  GI     COLONOSCOPY N/A 6/22/2015    Procedure: COLONOSCOPY;  Surgeon: Marilin Newman MD;  Location: [facility]     H ABLATION ATRIAL FLUTTER       HEART CATH DRUG ELUTING STENT PLACEMENT  4/19/2012    JEREMY x 2 to LCx     IMPLANT IMPLANTABLE CARDIOVERTER DEFIBRILLATOR  5-    ppm/aicd     KNEE SURGERY      right and left knee surgeries     LAPAROSCOPIC ASSISTED COLECTOMY LEFT (DESCENDING)  4/8/2014    Procedure: LAPAROSCOPIC ASSISTED COLECTOMY LEFT (DESCENDING);  Hand assisted Laparoscopic Sigmoid Colectomy , Laparoscopic mobilization of spleenic flexure *Latex Allergy*Anesthesia General with Block;  Surgeon: Chanelle Guzmán MD;  Location:  OR      REPAIR VALVE MITRAL  2006     30-mm Medtronic Julian ring      SELECTIVE LASER TRABECULOPLASTY (SLT) OD (RIGHT EYE)  4/10, 1/12,+1/9/13    RE     SELECTIVE LASER TRABECULOPLASTY (SLT) OS (LEFT EYE)  5/2012     SHOULDER SURGERY      right rotator cuff       Social History:  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    Family History:  There is no family history of skin cancer.    Medications:  Current Outpatient Prescriptions   Medication Sig Dispense Refill     aspirin 81 MG tablet Take 1 tablet by mouth daily.       atorvastatin (LIPITOR) 40 MG tablet Take 1 tablet (40 mg) by mouth daily 100 tablet 3     bacitracin ointment Apply topically 2 times daily APPLY TO LEFT LEG TWO TIMES PER DAY 28 g 0     Blood Pressure Monitor KIT 1 Units daily 1 kit 0     ciclopirox (LOPROX) 0.77 % cream Apply topically 2 times daily To feet and toenails. 90 g 6     cyanocobalamin (VITAMIN  B-12) 1000 MCG tablet Take 2 tablets (2,000 mcg) by mouth daily 180 tablet 3     doxazosin (CARDURA) 2 MG tablet Take 1/2 tab in the morning and 1/2 tab in the evening 90 tablet 3     fluticasone (FLONASE) 50 MCG/ACT nasal spray Spray 2 sprays into both nostrils daily 3 Package 0     ketoconazole (NIZORAL) 2 % cream Apply topically daily On hold 60 g 3     loratadine (CLARITIN) 10 MG tablet Take 10 mg by mouth as needed        metoprolol tartrate (LOPRESSOR) 25 MG tablet Take 1 tablet (25 mg) by mouth 2 times daily 180 tablet 3     mirtazapine (REMERON) 15 MG tablet Take 15 mg by mouth At Bedtime.       Multiple Vitamins-Minerals (CENTRUM SILVER) per tablet Take 1 tablet by mouth daily. AM        order for DME 20-30 mm Hg knee length compression stockings.  Measure and fit.  Style and color per patient preference.  Doquynh Hsu per patient need. 1 Units 0     sertraline (ZOLOFT) 100 MG tablet Take 100 mg by mouth every  evening.       triamcinolone (KENALOG) 0.1 % cream Apply topically 2 times daily For up to two weeks in a row 80 g 3     warfarin (COUMADIN) 5 MG tablet 7.5 mg on Wed; 5 mg all other days  or as instructed by coumadin clinic. 35 tablet 5     Allergies   Allergen Reactions     Latex Rash     Rash         Review of Systems:  -Skin Establ Pt: The patient denies any new rash, pruritus, or lesions that are symptomatic, changing or bleeding, except as per HPI.  -Constitutional: The patient denies fatigue, fevers, chills, and night sweats.  -HEENT: Patient denies nonhealing oral sores.  -Skin: As above in HPI. No additional skin concerns.    Physical exam:  Vitals: /79 (BP Location: Right arm, Patient Position: Chair, Cuff Size: Adult Regular)  Pulse 67  GEN: This is a well developed male in no acute distress, in a pleasant mood.    SKIN: Focused skin exam of the scalp, face, back and lower legs revealed the following  -Innumerable stuck on appearing, cerebriform and verrucous tan to brown papules and plaques on the scalp, face, and torso  -There is hypertrophy and yellow subungual debris of the toenails  -There are red-brown pinpoint macules on shins  -There is no interdigital or plantar scaling or erythema  -No other lesions of concern on areas examined.     Impression/Plan:  1. Stasis Dermatitis, resolved    Encouraged continued use of compression stockings    2. Tinea Pedis and Onychomycosis, Tinea pedis has resolved    Patient has cleared with terbinafine cream. Given his onychomycosis, should continue terbinafine cream TIW to prevent recurrence    3. Seborrheic Keratoses and inflammed seborrheic keratoses    Discussed the benign nature of these lesions  Cryotherapy procedure note (performed with medical student participation): After verbal consent and discussion of risks and benefits including but no limited to dyspigmentation/scar, blister, infection, recurrence, and pain, 7 lesions were treated with 1-2mm  freeze border for 2 cycles with liquid nitrogen. Post cryotherapy instructions were provided.    CC Dr. Roberto Sarmiento on close of this encounter.  Follow-up in 6 months, earlier for new or changing lesions.     Patient care seen and discussed with Dr. Manuel Alvarez MD  PGY5 Med-Derm  [phone]      Patient was seen and examined with the medicine/dermatology resident. I agree with the history, review of systems, physical examination, assessments and plan. I was present for the key portion of the cryotherapy procedure.     Lashawn Jackson MD  Professor and  Chair  Department of Dermatology  [facility]

## 2018-05-21 NOTE — MR AVS SNAPSHOT
After Visit Summary   2018    Noe Florence    MRN: [id number]           Patient Information     Date Of Birth          1935        Visit Information        Provider Department      2018 3:30 PM Lashawn Jackson MD [facility] Dermatology        Care Instructions    Buy the followin) Amlactin 12% cream to use daily on your whole body  2) You can buy a device called L'applique that will help you apply the cream to your back    Doing this will help with the itchiness          Follow-ups after your visit        Follow-up notes from your care team     Return in about 6 months (around 2018).      Your next 10 appointments already scheduled     May 29, 2018  2:00 PM CDT   Treatment 45 with Rica Bauman PT   [facility] ([facility])    [address] [place] [address]  [address] [facility] [address]   [phone]            May 29, 2018  3:30 PM CDT   Implanted Defibulator with Uc Cv Device 1   [facility] ([facility])    [address] [place] [address]  [address]  [facility] [address]   [phone]            May 29, 2018  4:00 PM CDT   (Arrive by 3:45 PM)   Return Vascular Visit with Frida Desai MD   [facility] ([facility] [place]    [address] [place] [address]  [address]  [facility] [address]   [phone]            2018 11:15 AM CDT   Treatment 45 with Rica Bauman PT   [facility] ([facility])    [address]  [address] [facility] [address]   [phone]            2018 12:30 PM CDT   Return Neuropathy Visit with Jase Duarte MD   [facility] ([facility])    [address]  [address]  [facility] [address] [phone]            Aug 06, 2018 10:00 AM CDT   (Arrive by 9:45 AM)   Return Visit with Roberto Sarmiento MD   [facility] ([facility])    [address]  4th Floor  [facility] [address]   [phone]            Oct 01, 2018  9:45 AM CDT   Lab with UC LAB   [facility] ([facility])    [address] [place] [address]  [address] [facility] [address]   [phone]            Oct 01, 2018 10:20 AM CDT   CT CHEST ABDOMEN PELVIS W/O & W CONTRAST with UCCT1   [facility] CT ([facility])    [address] [place] [address]  [address] [facility] [address] [phone]           Please bring any scans or X-rays taken at other hospitals, if similar tests were done. Also bring a list of your medicines, including vitamins, minerals and over-the-counter drugs. It is safest to leave personal items at home.  Be sure to tell your doctor:   If you have any allergies.   If there s any chance you are pregnant.   If you are breastfeeding.  How to prepare:   Do not eat or drink for 2 hours before your exam. If you need to take medicine, you may take it with small sips of water. (We may ask you to take liquid medicine as well.)   Please wear loose clothing, such as a sweat suit or jogging clothes. Avoid snaps, zippers and other metal. We may ask you to undress and put on a hospital gown.  Please arrive 30 minutes early for your CT. Once in the department you might be asked to drink water 15-20 minutes prior to your exam.  If indicated you may be asked to drink an oral contrast in advance of your CT.  If this is the case, the imaging team will let you know or be in contact with you prior to your appointment  Patients over 70 or patients with diabetes or kidney problems:   If you haven t had a blood test (creatinine test) within the last 30 days, the Cardiologist/Radiologist may require you to get this test prior to your exam.  If you have diabetes:   Continue to take your metformin medication on the day of your exam  If you have any questions, please call the Imaging Department where you will have  your exam.            Oct 02, 2018 11:10 AM CDT   (Arrive by 10:55 AM)   Return Visit with MARIA LUISA Lackey [facility] ([facility])    [address]  [address]  [facility] [address] [phone]              Who to contact     Please call your clinic at [phone] to:    Ask questions about your health    Make or cancel appointments    Discuss your medicines    Learn about your test results    Speak to your doctor            Additional Information About Your Visit        [employer] Information     [employer] gives you secure access to your electronic health record. If you see a primary care provider, you can also send messages to your care team and make appointments. If you have questions, please call your primary care clinic.  If you do not have a primary care provider, please call [phone] and they will assist you.      [employer] is an electronic gateway that provides easy, online access to your medical records. With [employer], you can request a clinic appointment, read your test results, renew a prescription or communicate with your care team.     To access your existing account, please contact your [facility] or call [phone] for assistance.        Care EveryWhere ID     This is your Care EveryWhere ID. This could be used by other organizations to access your [facility] medical records  [id number]        Your Vitals Were     Pulse                   67            Blood Pressure from Last 3 Encounters:   05/21/18 120/79   04/02/18 119/62   03/27/18 124/57    Weight from Last 3 Encounters:   04/02/18 66.2 kg (145 lb 14.4 oz)   03/27/18 66.8 kg (147 lb 3.2 oz)   03/23/18 65.1 kg (143 lb 8 oz)              Today, you had the following     No orders found for display       Primary Care Provider Office Phone # Fax #    Roberto Sarmiento [phone]       [address] [place] [address] [address]  [facility]  [address]        Equal Access to Services     [facility]: Hadii giovanny olsen betzaida Bains, waaxda luqadaha, qaybta kaalmada aicha, waxsophy sera wilcoxdafnemc minor. So [facility] [phone].    ATENCIÓN: Si habla español, tiene a aguirre disposición servicios gratuitos de asistencia lingüística. Llame al [phone].    We comply with applicable federal civil rights laws and [place] laws. We do not discriminate on the basis of race, color, national origin, age, disability, sex, sexual orientation, or gender identity.            Thank you!     Thank you for choosing [facility] DERMATOLOGY  for your care. Our goal is always to provide you with excellent care. Hearing back from our patients is one way we can continue to improve our services. Please take a few minutes to complete the written survey that you may receive in the mail after your visit with us. Thank you!             Your Updated Medication List - Protect others around you: Learn how to safely use, store and throw away your medicines at www.disposemymeds.org.          This list is accurate as of 5/21/18  3:58 PM.  Always use your most recent med list.                   Brand Name Dispense Instructions for use Diagnosis    aspirin 81 MG tablet      Take 1 tablet by mouth daily.        atorvastatin 40 MG tablet    LIPITOR    100 tablet    Take 1 tablet (40 mg) by mouth daily    Hyperlipidemia, unspecified hyperlipidemia type       bacitracin ointment     28 g    Apply topically 2 times daily APPLY TO LEFT LEG TWO TIMES PER DAY    Left leg cellulitis       Blood Pressure Monitor Kit     1 kit    1 Units daily    Hypertension       CENTRUM SILVER per tablet      Take 1 tablet by mouth daily. AM        ciclopirox 0.77 % cream    LOPROX    90 g    Apply topically 2 times daily To feet and toenails.    Dermatophytosis of nail, Tinea pedis of both feet       cyanocobalamin 1000 MCG tablet    vitamin  B-12    180 tablet    Take 2 tablets (2,000 mcg) by mouth daily     Anemia       doxazosin 2 MG tablet    CARDURA    90 tablet    Take 1/2 tab in the morning and 1/2 tab in the evening    Essential hypertension       fluticasone 50 MCG/ACT spray    FLONASE    3 Package    Spray 2 sprays into both nostrils daily    Unspecified sinusitis (chronic)       ketoconazole 2 % cream    NIZORAL    60 g    Apply topically daily On hold    Tinea corporis       loratadine 10 MG tablet    CLARITIN     Take 10 mg by mouth as needed        metoprolol tartrate 25 MG tablet    LOPRESSOR    180 tablet    Take 1 tablet (25 mg) by mouth 2 times daily    Coronary artery disease involving native heart without angina pectoris, unspecified vessel or lesion type       mirtazapine 15 MG tablet    REMERON     Take 15 mg by mouth At Bedtime.        order for DME     1 Units    20-30 mm Hg knee length compression stockings.  Measure and fit.  Style and color per patient preference.  Doff n Aracelis per patient need.    PVD (peripheral vascular disease) (H), Ulcer of left lower extremity, limited to breakdown of skin (H)       triamcinolone 0.1 % cream    KENALOG    80 g    Apply topically 2 times daily For up to two weeks in a row    Atopic eczema       warfarin 5 MG tablet    COUMADIN    35 tablet    7.5 mg on Wed; 5 mg all other days  or as instructed by coumadin clinic.    Atrial flutter (H)       ZOLOFT 100 MG tablet   Generic drug:  sertraline      Take 100 mg by mouth every evening.

## 2018-05-23 DIAGNOSIS — Z79.01 LONG-TERM (CURRENT) USE OF ANTICOAGULANTS: ICD-10-CM

## 2018-05-23 DIAGNOSIS — I48.0 PAROXYSMAL ATRIAL FIBRILLATION (H): ICD-10-CM

## 2018-05-23 LAB — INR PPP: 2.94 (ref 0.86–1.14)

## 2018-05-24 ENCOUNTER — ANTICOAGULATION THERAPY VISIT (OUTPATIENT)
Dept: ANTICOAGULATION | Facility: CLINIC | Age: 83
End: 2018-05-24

## 2018-05-24 DIAGNOSIS — I48.0 PAROXYSMAL ATRIAL FIBRILLATION (H): ICD-10-CM

## 2018-05-24 DIAGNOSIS — Z79.01 LONG-TERM (CURRENT) USE OF ANTICOAGULANTS: ICD-10-CM

## 2018-05-24 NOTE — PROGRESS NOTES
ANTICOAGULATION FOLLOW-UP CLINIC VISIT    Patient Name:  Noe Florence  Date:  5/24/2018  Contact Type:  Telephone    SUBJECTIVE:     Patient Findings     Positives No Problem Findings    Comments Pt took coumadin as directed - would have expected a greater drop in the INR - will check again when pt returns here on 6/5           OBJECTIVE    INR   Date Value Ref Range Status   05/23/2018 2.94 (H) 0.86 - 1.14 Final       ASSESSMENT / PLAN  INR assessment THER    Recheck INR In: 2 WEEKS    INR Location Clinic      Anticoagulation Summary as of 5/24/2018     INR goal 2.0-3.0   Today's INR 2.94 (5/23/2018)   Warfarin maintenance plan 5 mg (5 mg x 1) every day   Full warfarin instructions 5 mg every day   Weekly warfarin total 35 mg   No change documented Sary Parisi RN   Plan last modified Nguyen Zuniga RN (12/21/2017)   Next INR check 6/5/2018   Priority INR   Target end date Indefinite    Indications   Atrial fibrillation (H) [I48.91] [I48.91]  Long-term (current) use of anticoagulants [Z79.01] [Z79.01]         Anticoagulation Episode Summary     INR check location     Preferred lab     Send INR reminders to [facility]    Comments Patient contact number: [phone]   Can leave results with Rica (friend)      Anticoagulation Care Providers     Provider Role Specialty Phone number    Deedee Baird MD Responsible Cardiology [phone]            See the Encounter Report to view Anticoagulation Flowsheet and Dosing Calendar (Go to Encounters tab in chart review, and find the Anticoagulation Therapy Visit)    Spoke with patient's friend Rica. Gave them their lab results and new warfarin recommendation.  No changes in health, medication, or diet. No missed doses, no falls. No signs or symptoms of bleed or clotting.      Sary Parisi, RN

## 2018-05-24 NOTE — MR AVS SNAPSHOT
Noe Florence   5/24/2018   Anticoagulation Therapy Visit    Description:  83 year old male   Provider:  Sary Parisi, RN   Department:  [facility]           INR as of 5/24/2018     Today's INR 2.94 (5/23/2018)      Anticoagulation Summary as of 5/24/2018     INR goal 2.0-3.0   Today's INR 2.94 (5/23/2018)   Full warfarin instructions 5 mg every day   Next INR check 6/5/2018    Indications   Atrial fibrillation (H) [I48.91] [I48.91]  Long-term (current) use of anticoagulants [Z79.01] [Z79.01]         May 2018 Details    Sun Mon Tue Wed Thu Fri Sat       1               2               3               4               5                 6               7               8               9               10               11               12                 13               14               15               16               17               18               19                 20               21               22               23               24      5 mg   See details      25      5 mg         26      5 mg           27      5 mg         28      5 mg         29      5 mg         30      5 mg         31      5 mg            Date Details   05/24 This INR check               How to take your warfarin dose     To take:  5 mg Take 1 of the 5 mg tablets.           June 2018 Details    Sun Mon Tue Wed Thu Fri Sat          1      5 mg         2      5 mg           3      5 mg         4      5 mg         5            6               7               8               9                 10               11               12               13               14               15               16                 17               18               19               20               21               22               23                 24               25               26               27               28               29               30                Date Details   No additional details    Date of next INR:  6/5/2018         How to  take your warfarin dose     To take:  5 mg Take 1 of the 5 mg tablets.

## 2018-05-29 ENCOUNTER — ALLIED HEALTH/NURSE VISIT (OUTPATIENT)
Dept: CARDIOLOGY | Facility: CLINIC | Age: 83
End: 2018-05-29
Attending: INTERNAL MEDICINE
Payer: COMMERCIAL

## 2018-05-29 ENCOUNTER — THERAPY VISIT (OUTPATIENT)
Dept: PHYSICAL THERAPY | Facility: CLINIC | Age: 83
End: 2018-05-29
Payer: COMMERCIAL

## 2018-05-29 VITALS
OXYGEN SATURATION: 100 % | WEIGHT: 149.1 LBS | HEIGHT: 68 IN | SYSTOLIC BLOOD PRESSURE: 124 MMHG | BODY MASS INDEX: 22.6 KG/M2 | DIASTOLIC BLOOD PRESSURE: 65 MMHG | HEART RATE: 67 BPM

## 2018-05-29 DIAGNOSIS — I73.9 PVD (PERIPHERAL VASCULAR DISEASE) (H): Primary | ICD-10-CM

## 2018-05-29 DIAGNOSIS — I25.5 ISCHEMIC CARDIOMYOPATHY: Primary | ICD-10-CM

## 2018-05-29 DIAGNOSIS — R26.81 GAIT INSTABILITY: Primary | ICD-10-CM

## 2018-05-29 DIAGNOSIS — Z91.81 AT HIGH RISK FOR FALLS: ICD-10-CM

## 2018-05-29 DIAGNOSIS — M62.81 GENERALIZED MUSCLE WEAKNESS: ICD-10-CM

## 2018-05-29 DIAGNOSIS — M21.372 LEFT FOOT DROP: ICD-10-CM

## 2018-05-29 DIAGNOSIS — R09.89 BRUIT OF LEFT CAROTID ARTERY: ICD-10-CM

## 2018-05-29 DIAGNOSIS — M40.00 ACQUIRED POSTURAL KYPHOSIS: ICD-10-CM

## 2018-05-29 PROCEDURE — 99215 OFFICE O/P EST HI 40 MIN: CPT | Mod: ZP | Performed by: INTERNAL MEDICINE

## 2018-05-29 PROCEDURE — 93283 PRGRMG EVAL IMPLANTABLE DFB: CPT | Mod: 26 | Performed by: INTERNAL MEDICINE

## 2018-05-29 PROCEDURE — G0463 HOSPITAL OUTPT CLINIC VISIT: HCPCS | Mod: 25,ZF

## 2018-05-29 PROCEDURE — 93283 PRGRMG EVAL IMPLANTABLE DFB: CPT | Mod: ZF

## 2018-05-29 ASSESSMENT — PAIN SCALES - GENERAL: PAINLEVEL: NO PAIN (0)

## 2018-05-29 NOTE — LETTER
5/29/2018      RE: Noe Florence  [address] [facility] [address]       Dear Colleague,    Thank you for the opportunity to participate in the care of your patient, Noe Florence, at the [facility]. Please see a copy of my visit note below.           Vascular Cardiology Consultation Follow Up      HPI:     This is an 82 year old male with PMH AFIB, CKD, CAD s/p CABG x 2 and JEREMY x 2 (patient of Dr. Kenyon), HTN, HLD, iCMY, AFLutter s/p ablation, VT, and CVI and DVT here for evaluation of his vascular disease and follow up of CAD, iCMY. He is here with his pleasant wife providing some history.    Prior History:  Patient is a long standing , who played vigorously up until 80 years old from a young age. Only when he stopped playing tennis did he discover progressive swelling. He was prescribed compression stockings (20-30 mmHg) however these were too large for him and would not stay up appropriately. His swelling was off and on for 2 years. Has an open wound for which gets wound care and dressings. 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 He is chronically on warfarin and reports no bleeding/bruising.     Since last visit, we changed his compression stockings which seem to be assisting in keeping swelling at bay. He has no ongoing open wounds. Also had ABIs and duplex which showed a mild increase in right SFA velocities without concern for obstructive disease. He currently denies chest pain, shortness of breath. He is wishing to get back into tennis soon, at least standing-only with a ball throwing machine. His AF is well controlled as well as HTN.     He was seen in device clinic today and he is nearing end of battery life. Will have generator change in the next 6 months.        PAST MEDICAL HISTORY  Past Medical History:   Diagnosis Date     Advanced open-angle glaucoma       Antiplatelet or antithrombotic long-term use      Atrial fibrillation (H)      CKD (chronic kidney disease), stage III 2005     Colon cancer (H)     Stage II-B colon cancer     Coronary artery disease     s/p CABG x 2, JEREMY x 2     Diverticulitis      Hyperlipidemia      Hypertension      Ischemic cardiomyopathy      MGUS (monoclonal gammopathy of unknown significance)      Nonsenile cataract     BE     Pacemaker      Polymorphic ventricular tachycardia (H)      Polymyalgia rheumatica (H)      PVD (posterior vitreous detachment), both eyes 2005     S/P ablation of atrial flutter 6/20/14    CTI     Stented coronary artery      SVT (supraventricular tachycardia) (H)     PPM/AICD for NSVT     Upper leg DVT (deep venous thromboembolism), chronic (H)     Left     Weight loss, unintentional 2017    15 lb in 4 months       CURRENT MEDICATIONS  Current Outpatient Prescriptions   Medication Sig Dispense Refill     aspirin 81 MG tablet Take 1 tablet by mouth daily.       atorvastatin (LIPITOR) 40 MG tablet Take 1 tablet (40 mg) by mouth daily 100 tablet 3     bacitracin ointment Apply topically 2 times daily APPLY TO LEFT LEG TWO TIMES PER DAY 28 g 0     ciclopirox (LOPROX) 0.77 % cream Apply topically 2 times daily To feet and toenails. 90 g 6     cyanocobalamin (VITAMIN  B-12) 1000 MCG tablet Take 2 tablets (2,000 mcg) by mouth daily 180 tablet 3     doxazosin (CARDURA) 2 MG tablet Take 1/2 tab in the morning and 1/2 tab in the evening 90 tablet 3     ketoconazole (NIZORAL) 2 % cream Apply topically daily On hold 60 g 3     loratadine (CLARITIN) 10 MG tablet Take 10 mg by mouth as needed        metoprolol tartrate (LOPRESSOR) 25 MG tablet Take 1 tablet (25 mg) by mouth 2 times daily 180 tablet 3     mirtazapine (REMERON) 15 MG tablet Take 15 mg by mouth At Bedtime.       Multiple Vitamins-Minerals (CENTRUM SILVER) per tablet Take 1 tablet by mouth daily. AM        order for DME 20-30 mm Hg knee length compression  stockings.  Measure and fit.  Style and color per patient preference.  Doff n Aracelis per patient need. 1 Units 0     sertraline (ZOLOFT) 100 MG tablet Take 100 mg by mouth every evening.       triamcinolone (KENALOG) 0.1 % cream Apply topically 2 times daily For up to two weeks in a row 80 g 3     warfarin (COUMADIN) 5 MG tablet 7.5 mg on Wed; 5 mg all other days  or as instructed by coumadin clinic. 35 tablet 5     Blood Pressure Monitor KIT 1 Units daily (Patient not taking: Reported on 5/29/2018) 1 kit 0     fluticasone (FLONASE) 50 MCG/ACT nasal spray Spray 2 sprays into both nostrils daily (Patient not taking: Reported on 5/29/2018) 3 Package 0       PAST SURGICAL HISTORY:  Past Surgical History:   Procedure Laterality Date     C CABG, ARTERY-VEIN, FOUR  2006    LIMA-LAD, SVG-Rt PDA, SVG-OM2, SVG-Diag 1     C CABG, VEIN, SINGLE  1994    1-vessel CABG, SVG->PDRCA      CATARACT IOL, RT/LT Bilateral      COLONOSCOPY  3/13/2014    Procedure: COMBINED COLONOSCOPY, SINGLE BIOPSY/POLYPECTOMY BY BIOPSY;;  Surgeon: Mary Gerber MD;  Location:  GI     COLONOSCOPY N/A 6/22/2015    Procedure: COLONOSCOPY;  Surgeon: Marilin Newman MD;  Location:  GI     H ABLATION ATRIAL FLUTTER       HEART CATH DRUG ELUTING STENT PLACEMENT  4/19/2012    JEREMY x 2 to LCx     IMPLANT IMPLANTABLE CARDIOVERTER DEFIBRILLATOR  5-    ppm/aicd     KNEE SURGERY      right and left knee surgeries     LAPAROSCOPIC ASSISTED COLECTOMY LEFT (DESCENDING)  4/8/2014    Procedure: LAPAROSCOPIC ASSISTED COLECTOMY LEFT (DESCENDING);  Hand assisted Laparoscopic Sigmoid Colectomy , Laparoscopic mobilization of spleenic flexure *Latex Allergy*Anesthesia General with Block;  Surgeon: Chanelle Guzmán MD;  Location:  OR     REPAIR VALVE MITRAL  2006     30-mm Medtronic Julian ring      SELECTIVE LASER TRABECULOPLASTY (SLT) OD (RIGHT EYE)  4/10, 1/12,+1/9/13    RE     SELECTIVE LASER TRABECULOPLASTY (SLT) OS (LEFT EYE)   "5/2012     SHOULDER SURGERY      right rotator cuff       ALLERGIES     Allergies   Allergen Reactions     Latex Rash     Rash       FAMILY HISTORY  Family History   Problem Relation Age of Onset     C.A.D. Father      Anesthesia Reaction No family hx of      Crohn Disease No family hx of      Ulcerative Colitis No family hx of      Cancer - colorectal No family hx of      Macular Degeneration No family hx of      CANCER No family hx of      No known family hx of skin cancer     Melanoma No family hx of      Skin Cancer No family hx of          SOCIAL HISTORY  Social History     Social History     Marital status:      Spouse name: N/A     Number of children: N/A     Years of education: N/A     Occupational History     Not on file.     Social History Main Topics     Smoking status: Former Smoker     Types: Cigarettes, Cigars     Quit date: 1/1/1968     Smokeless tobacco: Never Used     Alcohol use 0.0 oz/week     0 Standard drinks or equivalent per week      Comment: 2-3 drinks twice weekly     Drug use: No     Sexual activity: Not on file     Other Topics Concern     Not on file     Social History Narrative       ROS:   Constitutional: No fever, chills, or sweats. No weight gain/loss   ENT: No visual disturbance, ear ache, epistaxis, sore throat  Allergies/Immunologic: Negative  Respiratory: No cough, hemoptysia  Cardiovascular: As per HPI  GI: No nausea, vomiting, hematemesis, melena, or hematochezia  : No urinary frequency, dysuria, or hematuria  Integument: Negative  Psychiatric: Negative  Neuro: Negative  Endocrinology: Negative   Musculoskeletal: Negative  Vascular: No walking impairment, claudication, ischemic rest pain or nonhealing wounds    EXAM:  /65 (BP Location: Left arm, Patient Position: Chair, Cuff Size: Adult Regular)  Pulse 67  Ht 1.727 m (5' 8\")  Wt 67.6 kg (149 lb 1.6 oz)  SpO2 100%  BMI 22.67 kg/m2  In general, the patient is a pleasant male in no apparent distress.    HEENT: " NC/AT.  PERRLA.  EOMI.  Sclerae white, not injected.  Nares clear.  Pharynx without erythema or exudate.  Dentition intact.    Neck: No adenopathy.  No thyromegaly. Carotids +2/2 bilaterally without bruits.  No jugular venous distension.   Heart: RRR. Normal S1, S2 splits physiologically. No murmur, rub, click, or gallop. The PMI is in the 5th ICS in the midclavicular line. There is no heave.    Lungs: CTA.  No ronchi, wheezes, rales.  No dullness to percussion.   Abdomen: Soft, nontender, nondistended. No organomegaly. No AAA.  No bruits.   Extremities: No clubbing, cyanosis. 1+ edema BL, stasis dermatitis. Wound left leg, stage II.  Vascular: Carotid bruit. 1+ DP and PT.     Labs:  LIPID RESULTS:  Lab Results   Component Value Date    CHOL 119 01/15/2018    HDL 58 01/15/2018    LDL 46 01/15/2018    TRIG 76 01/15/2018    CHOLHDLRATIO 3.2 09/16/2015    NHDL 61 01/15/2018       LIVER ENZYME RESULTS:  Lab Results   Component Value Date    AST 17 03/30/2018    ALT 19 03/30/2018       CBC RESULTS:  Lab Results   Component Value Date    WBC 6.0 03/30/2018    RBC 3.34 (L) 03/30/2018    HGB 10.2 (L) 03/30/2018    HCT 32.2 (L) 03/30/2018    MCV 96 03/30/2018    MCH 30.5 03/30/2018    MCHC 31.7 03/30/2018    RDW 13.6 03/30/2018     03/30/2018       BMP RESULTS:  Lab Results   Component Value Date     03/30/2018    POTASSIUM 4.2 03/30/2018    CHLORIDE 108 03/30/2018    CO2 26 03/30/2018    ANIONGAP 6 03/30/2018    GLC 93 03/30/2018    BUN 44 (H) 03/30/2018    CR 1.15 03/30/2018    GFRESTIMATED 58 (L) 03/30/2018    GFRESTBLACK 70 03/30/2018    KEITH 9.1 03/30/2018        A1C RESULTS:  Lab Results   Component Value Date    A1C 5.7 02/27/2012       Procedures:    Impression:  1. Right leg: Incompetence of right common femoral vein, residual  right great saphenous vein in the proximal and mid thigh and  incompetent right saphenofemoral junction. The great saphenous vein  dimensions in this location may be from 2 to 6  mm. Incompetent   vein measuring 4 mm in the leg four centimeter from the  medial malleolus, which connects to a competent tributary of the great  saphenous vein. No varicose veins. Incidental right popliteal cyst.     2. Left leg: Incompetence of the common femoral vein, femoral vein in  the distal thigh and popliteal vein. Incompetent saphenofemoral  junction, greater saphenous vein in the proximal calf. The diameter of  the greater saphenous vein about the SFJ varies from 4.9 to 7.2 mm and  3 to 4.4 mm in the region of the knee/proximal calf. 2 incompetent  perforators in the lower leg, measuring up to 2.7 and 2.3 cm. No  varicose veins.     3. Please see detailed assessment of arterial vasculature as per  report 11/13/2017.      Duplex  1. Right leg: No evidence of hemodynamically significant stenosis.  2. Left leg: Mildly elevated velocities in the mid SFA up to 211 cm/s,  indicating near 50% stenosis with biphasic distal waveforms. Abnormal  waveforms in the CFA are likely related to venous contamination given  lack of significant disease on the comparison CT from 9/26/17, and  relatively normal distal waveforms.       ABIs  Findings:  Right:    Arm: 115 mmHg   PT at ankle: 177 mmHg   DP at foot: 170 mmHg   DELFINO: 150   TBI: 1.56  Left:   Arm: 118 mmHg   PT at ankle: >254 mmHg   DP at foot: >254 mmHg   DELFINO: Noncompressible   TBI: 1.50  Impression:   Right leg: Resting DELFINO is 1.50, abnormal, >1.40 (Inconclusive  severity). This is likely related to calcified vessels as noted on  prior radiographs.  Left leg: Resting DELFINO is abnormal, >1.40 (Inconclusive severity). This  is likely related to calcified vessels as noted on prior radiographs.      Assessment and Plan:     #AFIB - continue metoprolol, coumadin    #CAD - no symptoms, s/p CABG and PCI x 2, continue on aspirin.     #DVT - on longterm warfarin, well controlled INR. No bleeding/bruising.    #CVI - probable partial PTS from old DVT that was not  managed with compression at the time, with progressive venous insufficiency. Swelling worsened due to more sedentary lifestyle different from his prior tennis days. Continue wound care for venous stasis (not likely to be arterial given recent vascular studies), and compression stockings. Discussed pneumatic compression if progresses but he likely does not need at this time.    #Device - due for battery change in fall, f/u EP as scheduled.    #PVD - mild left SFA disease, no claudication. Continue medical management. Obtain carotid u/s.       RTC in 6 months.     Total time spent 45 minutes, of which >50% was spent in face-to-face patient evaluation, reviewing data with patient, and coordination of care.     Frida Desai MD, MSC    Division of Cardiology  [facility]

## 2018-05-29 NOTE — NURSING NOTE
Vascular Testing: Patient given instructions regarding carotid duplex prior to f/u appt. Discussed purpose, preparation, procedure and when to expect results reported back to the patient. Patient demonstrated understanding of this information and agreed to call with further questions or concerns.  Med Reconcile: Reviewed and verified all current medications with the patient. The updated medication list was printed and given to the patient.  Return Appointment: 6 months with Dr. Desai.  Patient given instructions regarding scheduling next clinic visit. Patient demonstrated understanding of this information and agreed to call with further questions or concerns.  Patient stated he understood all health information given and agreed to call with further questions or concerns.

## 2018-05-29 NOTE — PROGRESS NOTES
Preliminary Device Interrogation Results.  Final physician signed paceart report to be scanned and attached.    Patient seen in clinic for evaluation and iterative programming of Medtronic Protecta XT  dual lead ICD per MD orders.  Patient has an appointment to see Dr. Desai today.  Normal ICD function.  2 AT/AF episodes recorded that lasted 3-6 minutes that had atrial rates >200 with RVR averaging 120-130s. Patient notes that doesn't remember feeling symptomatic of those episodes. Intrinsic rhythm = Sinus Rhythm @ 60's bpm.  AP = 70%.   = 9.3%. OptiVol fluid index is at baseline.  Estimated battery longevity to REBECCA = 0-4 months. Battery voltage = 2.65V, (RRT BV is 2.63V).   No short v-v intervals recorded.  RA and RV lead trends appear stable.  Patient reports that he is feeling well and denies complaints.  Plan for patient to return to clinic in 2 months for a device check and an appointment with Nurse Practitioner for approaching device replacement indicator.  Device tones were sampled for patient in office to watch for and programmed at 12pm so he would know when to listen for it.    dual lead ICD

## 2018-05-29 NOTE — MR AVS SNAPSHOT
After Visit Summary   5/29/2018    Noe Florence    MRN: [id number]           Patient Information     Date Of Birth          1935        Visit Information        Provider Department      5/29/2018 3:30 PM 1, Benoit Cv Device [facility]        Today's Diagnoses     Ischemic cardiomyopathy    -  1      Care Instructions    It was a pleasure to see you in clinic today. Please do not hesitate to call with any questions or concerns. We look forward to seeing you in clinic at your next device check in 2 months.    Mary Franklin, RN  Electrophysiology Nurse Clinician  [facility]  During business hours call:  [phone]  After business hours please call: [phone]- select option #4 and ask for job code [phone].            Follow-ups after your visit        Additional Services     Follow-Up with Cardiac Advanced Practice Provider       ICD check prior            Follow-Up with Device Clinic - 1 month       With Jaye Ponce's appt                  Your next 10 appointments already scheduled     Jun 05, 2018 11:15 AM CDT   Treatment 45 with Rica Bauman PT   [facility] Rehab ([facility])    [address] [place] [address]  [address] [facility] [address] [phone]            Jul 03, 2018 12:30 PM CDT   Return Neuropathy Visit with Jase Duarte MD   [facility] NEUROSPECIALTIES ([facility] Clin[facility])    [address] [place] [address]   [phone]            Aug 06, 2018 10:00 AM CDT   (Arrive by 9:45 AM)   Return Visit with Roberto Sarmiento MD   [facility] ([facility])    [address] [facility]  [address] [facility] [address]   [phone]            Oct 01, 2018  9:45 AM CDT   Lab with  LAB   [facility] Lab ([facility])    [address] [place] [address]  [address] [facility] [address]   [phone]            Oct 01, 2018 10:20 AM  CDT   CT CHEST ABDOMEN PELVIS W/O & W CONTRAST with UCCT1   [facility] [place] [facility] ([facility] [place])    [address]  1st Floor  [facility] [address] [phone]           Please bring any scans or X-rays taken at other hospitals, if similar tests were done. Also bring a list of your medicines, including vitamins, minerals and over-the-counter drugs. It is safest to leave personal items at home.  Be sure to tell your doctor:   If you have any allergies.   If there s any chance you are pregnant.   If you are breastfeeding.  How to prepare:   Do not eat or drink for 2 hours before your exam. If you need to take medicine, you may take it with small sips of water. (We may ask you to take liquid medicine as well.)   Please wear loose clothing, such as a sweat suit or jogging clothes. Avoid snaps, zippers and other metal. We may ask you to undress and put on a hospital gown.  Please arrive 30 minutes early for your CT. Once in the department you might be asked to drink water 15-20 minutes prior to your exam.  If indicated you may be asked to drink an oral contrast in advance of your CT.  If this is the case, the imaging team will let you know or be in contact with you prior to your appointment  Patients over 70 or patients with diabetes or kidney problems:   If you haven t had a blood test (creatinine test) within the last 30 days, the Cardiologist/Radiologist may require you to get this test prior to your exam.  If you have diabetes:   Continue to take your metformin medication on the day of your exam  If you have any questions, please call the Imaging Department where you will have your exam.            Oct 02, 2018 11:10 AM CDT   (Arrive by 10:55 AM)   Return Visit with MARIA LUISA Lackey CNP   [facility] ([facility] [place])    [address] [facility] [address]  [address]  [facility] [address] [phone]              Future tests that were  ordered for you today     Open Future Orders        Priority Expected Expires Ordered    Follow-Up with Cardiac Advanced Practice Provider Routine 8/1/2018 9/3/2018 5/29/2018    Follow-Up with Device Clinic - 1 month Routine 8/28/2018 9/26/2018 5/29/2018            Who to contact     If you have questions or need follow up information about today's clinic visit or your schedule please contact [facility] directly at [phone].  Normal or non-critical lab and imaging results will be communicated to you by [employer], letter or phone within 4 business days after the clinic has received the results. If you do not hear from us within 7 days, please contact the clinic through [employer] or phone. If you have a critical or abnormal lab result, we will notify you by phone as soon as possible.  Submit refill requests through [employer] or call your pharmacy and they will forward the refill request to us. Please allow 3 business days for your refill to be completed.          Additional Information About Your Visit        [employer] Information     [employer] gives you secure access to your electronic health record. If you see a primary care provider, you can also send messages to your care team and make appointments. If you have questions, please call your primary care clinic.  If you do not have a primary care provider, please call [phone] and they will assist you.        Care EveryWhere ID     This is your Care EveryWhere ID. This could be used by other organizations to access your [place] medical records  [id number]         Blood Pressure from Last 3 Encounters:   05/29/18 124/65   05/21/18 120/79   04/02/18 119/62    Weight from Last 3 Encounters:   05/29/18 67.6 kg (149 lb 1.6 oz)   04/02/18 66.2 kg (145 lb 14.4 oz)   03/27/18 66.8 kg (147 lb 3.2 oz)              We Performed the Following     ICD DEVICE PROGRAMMING EVAL, DUAL LEAD ICD        Primary Care Provider Office Phone # Fax #    Roberto Sarmiento MD  [phone]       [address] [place] [address]  [facility] [address]        Equal Access to Services     LEE CASTILLO : Delilah giovanny olsen betzaida Bains, wakerryda luqmj, qaybta kaalmada aicha, sil nettlesalena dave So [facility] [phone].    ATENCIÓN: Si habla español, tiene a aguirre disposición servicios gratuitos de asistencia lingüística. Llame al [phone].    We comply with applicable federal civil rights laws and [place] laws. We do not discriminate on the basis of race, color, national origin, age, disability, sex, sexual orientation, or gender identity.            Thank you!     Thank you for choosing [facility]  for your care. Our goal is always to provide you with excellent care. Hearing back from our patients is one way we can continue to improve our services. Please take a few minutes to complete the written survey that you may receive in the mail after your visit with us. Thank you!             Your Updated Medication List - Protect others around you: Learn how to safely use, store and throw away your medicines at www.disposemymeds.org.          This list is accurate as of 5/29/18  4:33 PM.  Always use your most recent med list.                   Brand Name Dispense Instructions for use Diagnosis    aspirin 81 MG tablet      Take 1 tablet by mouth daily.        atorvastatin 40 MG tablet    LIPITOR    100 tablet    Take 1 tablet (40 mg) by mouth daily    Hyperlipidemia, unspecified hyperlipidemia type       bacitracin ointment     28 g    Apply topically 2 times daily APPLY TO LEFT LEG TWO TIMES PER DAY    Left leg cellulitis       Blood Pressure Monitor Kit     1 kit    1 Units daily    Hypertension       CENTRUM SILVER per tablet      Take 1 tablet by mouth daily. AM        ciclopirox 0.77 % cream    LOPROX    90 g    Apply topically 2 times daily To feet and toenails.    Dermatophytosis of nail, Tinea pedis of both feet       cyanocobalamin 1000 MCG tablet     vitamin  B-12    180 tablet    Take 2 tablets (2,000 mcg) by mouth daily    Anemia       doxazosin 2 MG tablet    CARDURA    90 tablet    Take 1/2 tab in the morning and 1/2 tab in the evening    Essential hypertension       fluticasone 50 MCG/ACT spray    FLONASE    3 Package    Spray 2 sprays into both nostrils daily    Unspecified sinusitis (chronic)       ketoconazole 2 % cream    NIZORAL    60 g    Apply topically daily On hold    Tinea corporis       loratadine 10 MG tablet    CLARITIN     Take 10 mg by mouth as needed        metoprolol tartrate 25 MG tablet    LOPRESSOR    180 tablet    Take 1 tablet (25 mg) by mouth 2 times daily    Coronary artery disease involving native heart without angina pectoris, unspecified vessel or lesion type       mirtazapine 15 MG tablet    REMERON     Take 15 mg by mouth At Bedtime.        order for DME     1 Units    20-30 mm Hg knee length compression stockings.  Measure and fit.  Style and color per patient preference.  Doff n Aracelis per patient need.    PVD (peripheral vascular disease) (H), Ulcer of left lower extremity, limited to breakdown of skin (H)       triamcinolone 0.1 % cream    KENALOG    80 g    Apply topically 2 times daily For up to two weeks in a row    Atopic eczema       warfarin 5 MG tablet    COUMADIN    35 tablet    7.5 mg on Wed; 5 mg all other days  or as instructed by coumadin clinic.    Atrial flutter (H)       ZOLOFT 100 MG tablet   Generic drug:  sertraline      Take 100 mg by mouth every evening.

## 2018-05-29 NOTE — PATIENT INSTRUCTIONS
It was a pleasure to see you in clinic today. Please do not hesitate to call with any questions or concerns. We look forward to seeing you in clinic at your next device check in 2 months.    Mary Franklin RN  Electrophysiology Nurse Clinician  [facility]  During business hours call:  [phone]  After business hours please call: [phone]- select option #4 and ask for job code [phone].

## 2018-05-29 NOTE — NURSING NOTE
Chief Complaint   Patient presents with     Follow Up For      81 y/o male for 6 month f/u of venous stasis and venous insufficiency. Has been in compression     Vitals were taken and medications were reconciled.     Kiki Lopez MA    4:16 PM

## 2018-05-29 NOTE — MR AVS SNAPSHOT
After Visit Summary   5/29/2018    Noe Florence    MRN: [id number]           Patient Information     Date Of Birth          1935        Visit Information        Provider Department      5/29/2018 4:00 PM Frida Desai MD [facility]        Today's Diagnoses     PVD (peripheral vascular disease) (H)    -  1    Bruit of left carotid artery          Care Instructions    You were seen today in the Cardiovascular Clinic at the [facility].      Cardiology Providers you saw during your visit:  Dr. Desai    Diagnosis:  Venous insufficiency.  Carotid bruit    Results:  None today    Recommendations:   Continue with compression stockings.     Carotid duplex prior to your f/u appt in 6 months    Follow-up:  6 months with Dr. Desai.      For emergencies call [phone].    For any scheduling needs, please call [phone]. Option 1 then option 3    Thank you for your visit today!     Please call if you have any questions or concerns.  Juanito Woodard RN      What Is Duplex Ultrasound?      Duplex ultrasound is used to look at vessels that carry blood to and from major organs of the body.   Ultrasound is a test that uses sound waves to create detailed pictures of the inside of your body. Duplex ultrasound is a type of ultrasound that makes 2 kinds of images. First, it creates pictures of your blood vessels. Then, it makes graphs that show the speed and the direction of blood flow through the vessels. These images are viewed on a computer screen. No radiation or contrast fluid (dye) is used during the test. Ultrasound tests are safe to use during all stages of pregnancy.  What is duplex ultrasound used for?  Duplex ultrasound can help your healthcare provider find problems with blood vessels. These problems may include:    Carotid occlusive disease, which can lead to stroke.     Peripheral artery disease (PAD), which can lead to arm or leg pain.     Aneurysm. A ballooning out of a blood  vessel wall.    Dissection. A tear in the layers of a blood vessel wall.    Deep vein thrombosis (DVT). A blood clot in the deep veins of the legs.    Varicose veins. Swollen, twisted veins that can be seen under the skin s surface.    Abnormal blood flow. An abnormal increase or decrease of blood flow to an area of your body.   Duplex ultrasound can also help your healthcare provider:    Decide whether further testing is needed.    Determine the best treatment plan for you.    Get more information about your blood vessels before surgery is done.  Date Last Reviewed: 4/6/2016 2000-2017 [employer]. [address] [place] [address]. All rights reserved. This information is not intended as a substitute for professional medical care. Always follow your healthcare professional's instructions.                Follow-ups after your visit        Additional Services     Follow-Up with Vascular Cardiologist       Schedule carotid duplex for prior to f/u appt.                  Your next 10 appointments already scheduled     Jun 05, 2018 11:15 AM CDT   Treatment 45 with Rica Bauman PT   [facility])    [address] [place] [address]  4th Floor  [facility] [address] [phone]            Jul 03, 2018 12:30 PM CDT   Return Neuropathy Visit with Jase Duarte MD   [facility] ([facility])    [address] [place] [address]  [address] [place] [address] [phone]            Aug 02, 2018  1:30 PM CDT   (Arrive by 1:15 PM)   Implanted Defibulator with Uc Cv Device 1   [facility] ([facility] [place])    [address] [place] [address]  [address]  [facility] [address] [phone]            Aug 02, 2018  2:00 PM CDT   (Arrive by 1:45 PM)   RETURN ARRHYTHMIA with MARIA LUISA [facility] ([facility])    [address] [place] [address]  [address] [place] [address]    [phone]            Aug 06, 2018 10:00 AM CDT   (Arrive by 9:45 AM)   Return Visit with Roberto Sarmiento MD   [facility] ([facility])    [address] [facility]  4th [facility] [address] [phone]            Oct 01, 2018  9:45 AM CDT   Lab with  LAB   [facility] Lab ([facility])    [address] [place] [address]  1st [facility] [address] [phone]            Oct 01, 2018 10:20 AM CDT   CT CHEST ABDOMEN PELVIS W/O & W CONTRAST with UCCT1   [facility] CT ([facility])    [address] [address] [phone]           Please bring any scans or X-rays taken at other hospitals, if similar tests were done. Also bring a list of your medicines, including vitamins, minerals and over-the-counter drugs. It is safest to leave personal items at home.  Be sure to tell your doctor:   If you have any allergies.   If there s any chance you are pregnant.   If you are breastfeeding.  How to prepare:   Do not eat or drink for 2 hours before your exam. If you need to take medicine, you may take it with small sips of water. (We may ask you to take liquid medicine as well.)   Please wear loose clothing, such as a sweat suit or jogging clothes. Avoid snaps, zippers and other metal. We may ask you to undress and put on a hospital gown.  Please arrive 30 minutes early for your CT. Once in the department you might be asked to drink water 15-20 minutes prior to your exam.  If indicated you may be asked to drink an oral contrast in advance of your CT.  If this is the case, the imaging team will let you know or be in contact with you prior to your appointment  Patients over 70 or patients with diabetes or kidney problems:   If you haven t had a blood test (creatinine test) within the last 30 days, the Cardiologist/Radiologist may require you to get this test prior to your  exam.  If you have diabetes:   Continue to take your metformin medication on the day of your exam  If you have any questions, please call the Imaging Department where you will have your exam.            Oct 02, 2018 11:10 AM CDT   (Arrive by 10:55 AM)   Return Visit with MARIA LUISA Lackey CNP   [facility] ([facility])    [address]  [address]  [facility] [address] [phone]            Nov 20, 2018 11:00 AM CST   US CAROTID BILATERAL with UCUSV2   [facility]  ([facility])    [address]  1st Floor  [facility] [address] [phone]           Please bring a list of your medicines (including vitamins, minerals and over-the-counter drugs). Also, tell your doctor about any allergies you may have. Wear comfortable clothes and leave your valuables at home.  You do not need to do anything special to prepare for your exam.  Please call the Imaging Department at your exam site with any questions.            Nov 27, 2018  1:00 PM CST   (Arrive by 12:45 PM)   Return Vascular Visit with Frida Desai MD   [facility] ([facility])    [address] [place] [address]  [address]  [facility] [address] [phone]              Future tests that were ordered for you today     Open Future Orders        Priority Expected Expires Ordered    Follow-Up with Vascular Cardiologist Routine 11/25/2018 2/23/2019 5/29/2018    US Carotid Bilateral Routine  5/29/2019 5/29/2018    Follow-Up with Cardiac Advanced Practice Provider Routine 8/1/2018 9/3/2018 5/29/2018    Follow-Up with Device Clinic - 1 month Routine 8/28/2018 9/26/2018 5/29/2018            Who to contact     If you have questions or need follow up information about today's clinic visit or your schedule please contact [facility] directly at [phone].  Normal or non-critical lab and imaging results will be communicated  "to you by MyChart, letter or phone within 4 business days after the clinic has received the results. If you do not hear from us within 7 days, please contact the clinic through [employer] or phone. If you have a critical or abnormal lab result, we will notify you by phone as soon as possible.  Submit refill requests through [employer] or call your pharmacy and they will forward the refill request to us. Please allow 3 business days for your refill to be completed.          Additional Information About Your Visit        [employer] Information     [employer] gives you secure access to your electronic health record. If you see a primary care provider, you can also send messages to your care team and make appointments. If you have questions, please call your primary care clinic.  If you do not have a primary care provider, please call [phone] and they will assist you.        Care EveryWhere ID     This is your Care EveryWhere ID. This could be used by other organizations to access your [place] medical records  [id number]        Your Vitals Were     Pulse Height Pulse Oximetry BMI (Body Mass Index)          67 1.727 m (5' 8\") 100% 22.67 kg/m2         Blood Pressure from Last 3 Encounters:   05/29/18 124/65   05/21/18 120/79   04/02/18 119/62    Weight from Last 3 Encounters:   05/29/18 67.6 kg (149 lb 1.6 oz)   04/02/18 66.2 kg (145 lb 14.4 oz)   03/27/18 66.8 kg (147 lb 3.2 oz)               Primary Care Provider Office Phone # Fax #    Roberto Fabiano Sarmiento [phone]       [address] [place] [address]        Equal Access to Services     LEE CASTILLO AH: Hadamanda Bains, jayashree harmon, sil irene. So [facility] [phone].    ATENCIÓN: Si habla español, tiene a aguirre disposición servicios gratuitos de asistencia lingüística. Lauren al [phone].    We comply with applicable federal civil rights laws and [place] laws. " We do not discriminate on the basis of race, color, national origin, age, disability, sex, sexual orientation, or gender identity.            Thank you!     Thank you for choosing [facility]  for your care. Our goal is always to provide you with excellent care. Hearing back from our patients is one way we can continue to improve our services. Please take a few minutes to complete the written survey that you may receive in the mail after your visit with us. Thank you!             Your Updated Medication List - Protect others around you: Learn how to safely use, store and throw away your medicines at www.disposemymeds.org.          This list is accurate as of 5/29/18  5:41 PM.  Always use your most recent med list.                   Brand Name Dispense Instructions for use Diagnosis    aspirin 81 MG tablet      Take 1 tablet by mouth daily.        atorvastatin 40 MG tablet    LIPITOR    100 tablet    Take 1 tablet (40 mg) by mouth daily    Hyperlipidemia, unspecified hyperlipidemia type       bacitracin ointment     28 g    Apply topically 2 times daily APPLY TO LEFT LEG TWO TIMES PER DAY    Left leg cellulitis       Blood Pressure Monitor Kit     1 kit    1 Units daily    Hypertension       CENTRUM SILVER per tablet      Take 1 tablet by mouth daily. AM        ciclopirox 0.77 % cream    LOPROX    90 g    Apply topically 2 times daily To feet and toenails.    Dermatophytosis of nail, Tinea pedis of both feet       cyanocobalamin 1000 MCG tablet    vitamin  B-12    180 tablet    Take 2 tablets (2,000 mcg) by mouth daily    Anemia       doxazosin 2 MG tablet    CARDURA    90 tablet    Take 1/2 tab in the morning and 1/2 tab in the evening    Essential hypertension       fluticasone 50 MCG/ACT spray    FLONASE    3 Package    Spray 2 sprays into both nostrils daily    Unspecified sinusitis (chronic)       ketoconazole 2 % cream    NIZORAL    60 g    Apply topically daily On hold    Tinea corporis        loratadine 10 MG tablet    CLARITIN     Take 10 mg by mouth as needed        metoprolol tartrate 25 MG tablet    LOPRESSOR    180 tablet    Take 1 tablet (25 mg) by mouth 2 times daily    Coronary artery disease involving native heart without angina pectoris, unspecified vessel or lesion type       mirtazapine 15 MG tablet    REMERON     Take 15 mg by mouth At Bedtime.        order for DME     1 Units    20-30 mm Hg knee length compression stockings.  Measure and fit.  Style and color per patient preference.  Doff n Aracelis per patient need.    PVD (peripheral vascular disease) (H), Ulcer of left lower extremity, limited to breakdown of skin (H)       triamcinolone 0.1 % cream    KENALOG    80 g    Apply topically 2 times daily For up to two weeks in a row    Atopic eczema       warfarin 5 MG tablet    COUMADIN    35 tablet    7.5 mg on Wed; 5 mg all other days  or as instructed by coumadin clinic.    Atrial flutter (H)       ZOLOFT 100 MG tablet   Generic drug:  sertraline      Take 100 mg by mouth every evening.

## 2018-05-29 NOTE — PROGRESS NOTES
Vascular Cardiology Consultation Follow Up      HPI:     This is an 82 year old male with PMH AFIB, CKD, CAD s/p CABG x 2 and JEREMY x 2 (patient of Dr. Kenyon), HTN, HLD, iCMY, AFLutter s/p ablation, VT, and CVI and DVT here for evaluation of his vascular disease and follow up of CAD, iCMY. He is here with his pleasant wife providing some history.    Prior History:  Patient is a long standing , who played vigorously up until 80 years old from a young age. Only when he stopped playing tennis did he discover progressive swelling. He was prescribed compression stockings (20-30 mmHg) however these were too large for him and would not stay up appropriately. His swelling was off and on for 2 years. Has an open wound for which gets wound care and dressings. 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 He is chronically on warfarin and reports no bleeding/bruising.     Since last visit, we changed his compression stockings which seem to be assisting in keeping swelling at bay. He has no ongoing open wounds. Also had ABIs and duplex which showed a mild increase in right SFA velocities without concern for obstructive disease. He currently denies chest pain, shortness of breath. He is wishing to get back into tennis soon, at least standing-only with a ball throwing machine. His AF is well controlled as well as HTN.     He was seen in device clinic today and he is nearing end of battery life. Will have generator change in the next 6 months.        PAST MEDICAL HISTORY  Past Medical History:   Diagnosis Date     Advanced open-angle glaucoma      Antiplatelet or antithrombotic long-term use      Atrial fibrillation (H)      CKD (chronic kidney disease), stage III 2005     Colon cancer (H)     Stage II-B colon cancer     Coronary artery disease     s/p CABG x 2, JEREMY x 2     Diverticulitis      Hyperlipidemia      Hypertension      Ischemic  cardiomyopathy      MGUS (monoclonal gammopathy of unknown significance)      Nonsenile cataract     BE     Pacemaker      Polymorphic ventricular tachycardia (H)      Polymyalgia rheumatica (H)      PVD (posterior vitreous detachment), both eyes 2005     S/P ablation of atrial flutter 6/20/14    CTI     Stented coronary artery      SVT (supraventricular tachycardia) (H)     PPM/AICD for NSVT     Upper leg DVT (deep venous thromboembolism), chronic (H)     Left     Weight loss, unintentional 2017    15 lb in 4 months       CURRENT MEDICATIONS  Current Outpatient Prescriptions   Medication Sig Dispense Refill     aspirin 81 MG tablet Take 1 tablet by mouth daily.       atorvastatin (LIPITOR) 40 MG tablet Take 1 tablet (40 mg) by mouth daily 100 tablet 3     bacitracin ointment Apply topically 2 times daily APPLY TO LEFT LEG TWO TIMES PER DAY 28 g 0     ciclopirox (LOPROX) 0.77 % cream Apply topically 2 times daily To feet and toenails. 90 g 6     cyanocobalamin (VITAMIN  B-12) 1000 MCG tablet Take 2 tablets (2,000 mcg) by mouth daily 180 tablet 3     doxazosin (CARDURA) 2 MG tablet Take 1/2 tab in the morning and 1/2 tab in the evening 90 tablet 3     ketoconazole (NIZORAL) 2 % cream Apply topically daily On hold 60 g 3     loratadine (CLARITIN) 10 MG tablet Take 10 mg by mouth as needed        metoprolol tartrate (LOPRESSOR) 25 MG tablet Take 1 tablet (25 mg) by mouth 2 times daily 180 tablet 3     mirtazapine (REMERON) 15 MG tablet Take 15 mg by mouth At Bedtime.       Multiple Vitamins-Minerals (CENTRUM SILVER) per tablet Take 1 tablet by mouth daily. AM        order for DME 20-30 mm Hg knee length compression stockings.  Measure and fit.  Style and color per patient preference.  Doquynh n Aracelis per patient need. 1 Units 0     sertraline (ZOLOFT) 100 MG tablet Take 100 mg by mouth every evening.       triamcinolone (KENALOG) 0.1 % cream Apply topically 2 times daily For up to two weeks in a row 80 g 3     warfarin  (COUMADIN) 5 MG tablet 7.5 mg on Wed; 5 mg all other days  or as instructed by coumadin clinic. 35 tablet 5     Blood Pressure Monitor KIT 1 Units daily (Patient not taking: Reported on 5/29/2018) 1 kit 0     fluticasone (FLONASE) 50 MCG/ACT nasal spray Spray 2 sprays into both nostrils daily (Patient not taking: Reported on 5/29/2018) 3 Package 0       PAST SURGICAL HISTORY:  Past Surgical History:   Procedure Laterality Date     C CABG, ARTERY-VEIN, FOUR  2006    LIMA-LAD, SVG-Rt PDA, SVG-OM2, SVG-Diag 1     C CABG, VEIN, SINGLE  1994    1-vessel CABG, SVG->PDRCA      CATARACT IOL, RT/LT Bilateral      COLONOSCOPY  3/13/2014    Procedure: COMBINED COLONOSCOPY, SINGLE BIOPSY/POLYPECTOMY BY BIOPSY;;  Surgeon: Mary Gerber MD;  [facility]: [facility]     COLONOSCOPY N/A 6/22/2015    Procedure: COLONOSCOPY;  Surgeon: Marilin Newman MD;  Location: [facility]     H ABLATION ATRIAL FLUTTER       HEART CATH DRUG ELUTING STENT PLACEMENT  4/19/2012    JEREMY x 2 to LCx     IMPLANT IMPLANTABLE CARDIOVERTER DEFIBRILLATOR  5-    ppm/aicd     KNEE SURGERY      right and left knee surgeries     LAPAROSCOPIC ASSISTED COLECTOMY LEFT (DESCENDING)  4/8/2014    Procedure: LAPAROSCOPIC ASSISTED COLECTOMY LEFT (DESCENDING);  Hand assisted Laparoscopic Sigmoid Colectomy , Laparoscopic mobilization of spleenic flexure *Latex Allergy*Anesthesia General with Block;  Surgeon: Chanelle Guzmán MD;  Location:  OR     REPAIR VALVE MITRAL  2006     30-mm Medtronic Julian ring      SELECTIVE LASER TRABECULOPLASTY (SLT) OD (RIGHT EYE)  4/10, 1/12,+1/9/13    RE     SELECTIVE LASER TRABECULOPLASTY (SLT) OS (LEFT EYE)  5/2012     SHOULDER SURGERY      right rotator cuff       ALLERGIES     Allergies   Allergen Reactions     Latex Rash     Rash       FAMILY HISTORY  Family History   Problem Relation Age of Onset     C.A.D. Father      Anesthesia Reaction No family hx of      Crohn Disease No family hx of      Ulcerative  "Colitis No family hx of      Cancer - colorectal No family hx of      Macular Degeneration No family hx of      CANCER No family hx of      No known family hx of skin cancer     Melanoma No family hx of      Skin Cancer No family hx of          SOCIAL HISTORY  Social History     Social History     Marital status:      Spouse name: N/A     Number of children: N/A     Years of education: N/A     Occupational History     Not on file.     Social History Main Topics     Smoking status: Former Smoker     Types: Cigarettes, Cigars     Quit date: 1/1/1968     Smokeless tobacco: Never Used     Alcohol use 0.0 oz/week     0 Standard drinks or equivalent per week      Comment: 2-3 drinks twice weekly     Drug use: No     Sexual activity: Not on file     Other Topics Concern     Not on file     Social History Narrative       ROS:   Constitutional: No fever, chills, or sweats. No weight gain/loss   ENT: No visual disturbance, ear ache, epistaxis, sore throat  Allergies/Immunologic: Negative  Respiratory: No cough, hemoptysia  Cardiovascular: As per HPI  GI: No nausea, vomiting, hematemesis, melena, or hematochezia  : No urinary frequency, dysuria, or hematuria  Integument: Negative  Psychiatric: Negative  Neuro: Negative  Endocrinology: Negative   Musculoskeletal: Negative  Vascular: No walking impairment, claudication, ischemic rest pain or nonhealing wounds    EXAM:  /65 (BP Location: Left arm, Patient Position: Chair, Cuff Size: Adult Regular)  Pulse 67  Ht 1.727 m (5' 8\")  Wt 67.6 kg (149 lb 1.6 oz)  SpO2 100%  BMI 22.67 kg/m2  In general, the patient is a pleasant male in no apparent distress.    HEENT: NC/AT.  PERRLA.  EOMI.  Sclerae white, not injected.  Nares clear.  Pharynx without erythema or exudate.  Dentition intact.    Neck: No adenopathy.  No thyromegaly. Carotids +2/2 bilaterally without bruits.  No jugular venous distension.   Heart: RRR. Normal S1, S2 splits physiologically. No murmur, rub, " click, or gallop. The PMI is in the 5th ICS in the midclavicular line. There is no heave.    Lungs: CTA.  No ronchi, wheezes, rales.  No dullness to percussion.   Abdomen: Soft, nontender, nondistended. No organomegaly. No AAA.  No bruits.   Extremities: No clubbing, cyanosis. 1+ edema BL, stasis dermatitis. Wound left leg, stage II.  Vascular: Carotid bruit. 1+ DP and PT.     Labs:  LIPID RESULTS:  Lab Results   Component Value Date    CHOL 119 01/15/2018    HDL 58 01/15/2018    LDL 46 01/15/2018    TRIG 76 01/15/2018    CHOLHDLRATIO 3.2 09/16/2015    NHDL 61 01/15/2018       LIVER ENZYME RESULTS:  Lab Results   Component Value Date    AST 17 03/30/2018    ALT 19 03/30/2018       CBC RESULTS:  Lab Results   Component Value Date    WBC 6.0 03/30/2018    RBC 3.34 (L) 03/30/2018    HGB 10.2 (L) 03/30/2018    HCT 32.2 (L) 03/30/2018    MCV 96 03/30/2018    MCH 30.5 03/30/2018    MCHC 31.7 03/30/2018    RDW 13.6 03/30/2018     03/30/2018       BMP RESULTS:  Lab Results   Component Value Date     03/30/2018    POTASSIUM 4.2 03/30/2018    CHLORIDE 108 03/30/2018    CO2 26 03/30/2018    ANIONGAP 6 03/30/2018    GLC 93 03/30/2018    BUN 44 (H) 03/30/2018    CR 1.15 03/30/2018    GFRESTIMATED 58 (L) 03/30/2018    GFRESTBLACK 70 03/30/2018    KEITH 9.1 03/30/2018        A1C RESULTS:  Lab Results   Component Value Date    A1C 5.7 02/27/2012       Procedures:    Impression:  1. Right leg: Incompetence of right common femoral vein, residual  right great saphenous vein in the proximal and mid thigh and  incompetent right saphenofemoral junction. The great saphenous vein  dimensions in this location may be from 2 to 6 mm. Incompetent   vein measuring 4 mm in the leg four centimeter from the  medial malleolus, which connects to a competent tributary of the great  saphenous vein. No varicose veins. Incidental right popliteal cyst.     2. Left leg: Incompetence of the common femoral vein, femoral vein in  the  distal thigh and popliteal vein. Incompetent saphenofemoral  junction, greater saphenous vein in the proximal calf. The diameter of  the greater saphenous vein about the SFJ varies from 4.9 to 7.2 mm and  3 to 4.4 mm in the region of the knee/proximal calf. 2 incompetent  perforators in the lower leg, measuring up to 2.7 and 2.3 cm. No  varicose veins.     3. Please see detailed assessment of arterial vasculature as per  report 11/13/2017.      Duplex  1. Right leg: No evidence of hemodynamically significant stenosis.  2. Left leg: Mildly elevated velocities in the mid SFA up to 211 cm/s,  indicating near 50% stenosis with biphasic distal waveforms. Abnormal  waveforms in the CFA are likely related to venous contamination given  lack of significant disease on the comparison CT from 9/26/17, and  relatively normal distal waveforms.       ABIs  Findings:  Right:    Arm: 115 mmHg   PT at ankle: 177 mmHg   DP at foot: 170 mmHg   DELFINO: 150   TBI: 1.56  Left:   Arm: 118 mmHg   PT at ankle: >254 mmHg   DP at foot: >254 mmHg   DELFINO: Noncompressible   TBI: 1.50  Impression:   Right leg: Resting DELFINO is 1.50, abnormal, >1.40 (Inconclusive  severity). This is likely related to calcified vessels as noted on  prior radiographs.  Left leg: Resting DELFINO is abnormal, >1.40 (Inconclusive severity). This  is likely related to calcified vessels as noted on prior radiographs.      Assessment and Plan:     #AFIB - continue metoprolol, coumadin    #CAD - no symptoms, s/p CABG and PCI x 2, continue on aspirin.     #DVT - on longterm warfarin, well controlled INR. No bleeding/bruising.    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 Swelling worsened due to more sedentary lifestyle different from his prior tennis days. Continue wound care for venous stasis (not likely to be arterial given recent vascular studies), and compression stockings. Discussed  pneumatic compression if progresses but he likely does not need at this time.    #Device - due for battery change in fall, f/u EP as scheduled.    #PVD - mild left SFA disease, no claudication. Continue medical management. Obtain carotid u/s.       RTC in 6 months.     Total time spent 45 minutes, of which >50% was spent in face-to-face patient evaluation, reviewing data with patient, and coordination of care.     Frida Desai MD, MSC    Division of Cardiology  [facility]

## 2018-05-29 NOTE — PATIENT INSTRUCTIONS
You were seen today in the Cardiovascular Clinic at the [facility].      Cardiology Providers you saw during your visit:  Dr. Desai    Diagnosis:  Venous insufficiency.  Carotid bruit    Results:  None today    Recommendations:   Continue with compression stockings.     Carotid duplex prior to your f/u appt in 6 months    Follow-up:  6 months with Dr. Desai.      For emergencies call 911.    For any scheduling needs, please call [phone]. Option 1 then option 3    Thank you for your visit today!     Please call if you have any questions or concerns.  Juanito Woodard RN      What Is Duplex Ultrasound?      Duplex ultrasound is used to look at vessels that carry blood to and from major organs of the body.   Ultrasound is a test that uses sound waves to create detailed pictures of the inside of your body. Duplex ultrasound is a type of ultrasound that makes 2 kinds of images. First, it creates pictures of your blood vessels. Then, it makes graphs that show the speed and the direction of blood flow through the vessels. These images are viewed on a computer screen. No radiation or contrast fluid (dye) is used during the test. Ultrasound tests are safe to use during all stages of pregnancy.  What is duplex ultrasound used for?  Duplex ultrasound can help your healthcare provider find problems with blood vessels. These problems may include:    Carotid occlusive disease, which can lead to stroke.     Peripheral artery disease (PAD), which can lead to arm or leg pain.     Aneurysm. A ballooning out of a blood vessel wall.    Dissection. A tear in the layers of a blood vessel wall.    Deep vein thrombosis (DVT). A blood clot in the deep veins of the legs.    Varicose veins. Swollen, twisted veins that can be seen under the skin s surface.    Abnormal blood flow. An abnormal increase or decrease of blood flow to an area of your body.   Duplex ultrasound can also help your healthcare provider:    Decide  whether further testing is needed.    Determine the best treatment plan for you.    Get more information about your blood vessels before surgery is done.  Date Last Reviewed: 4/6/2016 2000-2017 [employer]. [address] [place] [address], [place] [address]. All rights reserved. This information is not intended as a substitute for professional medical care. Always follow your healthcare professional's instructions.

## 2018-06-05 ENCOUNTER — ANTICOAGULATION THERAPY VISIT (OUTPATIENT)
Dept: ANTICOAGULATION | Facility: CLINIC | Age: 83
End: 2018-06-05

## 2018-06-05 ENCOUNTER — THERAPY VISIT (OUTPATIENT)
Dept: PHYSICAL THERAPY | Facility: CLINIC | Age: 83
End: 2018-06-05
Payer: COMMERCIAL

## 2018-06-05 DIAGNOSIS — Z79.01 LONG-TERM (CURRENT) USE OF ANTICOAGULANTS: ICD-10-CM

## 2018-06-05 DIAGNOSIS — M40.00 ACQUIRED POSTURAL KYPHOSIS: ICD-10-CM

## 2018-06-05 DIAGNOSIS — M21.372 LEFT FOOT DROP: ICD-10-CM

## 2018-06-05 DIAGNOSIS — I48.0 PAROXYSMAL ATRIAL FIBRILLATION (H): ICD-10-CM

## 2018-06-05 DIAGNOSIS — M62.81 GENERALIZED MUSCLE WEAKNESS: ICD-10-CM

## 2018-06-05 DIAGNOSIS — Z91.81 AT HIGH RISK FOR FALLS: ICD-10-CM

## 2018-06-05 DIAGNOSIS — R26.81 GAIT INSTABILITY: Primary | ICD-10-CM

## 2018-06-05 LAB — INR PPP: 2.64 (ref 0.86–1.14)

## 2018-06-05 RX ORDER — WARFARIN SODIUM 5 MG/1
TABLET ORAL
Qty: 90 TABLET | Refills: 3 | Status: ON HOLD | OUTPATIENT
Start: 2018-06-05 | End: 2019-03-12

## 2018-06-05 NOTE — PROGRESS NOTES
ANTICOAGULATION FOLLOW-UP CLINIC VISIT    Patient Name:  Noe Florence  Date:  6/5/2018  Contact Type:  Telephone    SUBJECTIVE:     Patient Findings     Positives No Problem Findings    Comments Spoke to patient and Rica.  Filled prescription at [facility].  Will check in 5 weeks at his appointment on 7/3.           OBJECTIVE    INR   Date Value Ref Range Status   06/05/2018 2.64 (H) 0.86 - 1.14 Final       ASSESSMENT / PLAN  INR assessment THER    Recheck INR In: 5 WEEKS    INR Location Clinic      Anticoagulation Summary as of 6/5/2018     INR goal 2.0-3.0   Today's INR 2.64   Warfarin maintenance plan 5 mg (5 mg x 1) every day   Full warfarin instructions 5 mg every day   Weekly warfarin total 35 mg   No change documented Ciro Sharp RN   Plan last modified Nguyen Zuniga RN (12/21/2017)   Next INR check 7/3/2018   Priority INR   Target end date Indefinite    Indications   Atrial fibrillation (H) [I48.91] [I48.91]  Long-term (current) use of anticoagulants [Z79.01] [Z79.01]         Anticoagulation Episode Summary     INR check location     Preferred lab     Send INR reminders to [facility]    Comments Patient contact number: [phone]   Can leave results with Rica (friend)      Anticoagulation Care Providers     Provider Role Specialty Phone number    Deedee Baird MD [facility] Cardiology [phone]            See the Encounter Report to view Anticoagulation Flowsheet and Dosing Calendar (Go to Encounters tab in chart review, and find the Anticoagulation Therapy Visit)    Spoke with patient. Gave them their lab results and new warfarin recommendation.  No changes in health, medication, or diet. No missed doses, no falls. No signs or symptoms of bleed or clotting.    Ciro Sharp, RN

## 2018-06-05 NOTE — MR AVS SNAPSHOT
After Visit Summary   6/5/2018    Noe Florence    MRN: [id number]           Patient Information     Date Of Birth          1935        Visit Information        Provider Department      6/5/2018 11:15 AM Rica Bauman PT [facility]        Today's Diagnoses     Gait instability    -  1    At high risk for falls        Generalized muscle weakness        Acquired postural kyphosis        Left foot drop           Follow-ups after your visit        Your next 10 appointments already scheduled     Jul 03, 2018 12:30 PM CDT   Return Neuropathy Visit with Jase Duarte MD   [facility] ([facility])    [address]  [address]  [facility] [address]   [phone]            Aug 02, 2018  1:30 PM CDT   (Arrive by 1:15 PM)   Implanted Defibulator with Uc Cv Device 1   [facility] ([facility])    [address] [place] [address]  [address]  [facility] [address]   [phone]            Aug 02, 2018  2:00 PM CDT   (Arrive by 1:45 PM)   RETURN ARRHYTHMIA with MARIA LUISA Rondon [facility] ([facility])    [address] [place] [address]  [address]  [facility] [address] [phone]            Aug 06, 2018 10:00 AM CDT   (Arrive by 9:45 AM)   Return Visit with Roberto Sarmiento MD   [facility] ([facility])    [address] [place] [address]  [address] [facility] [address] [phone]            Oct 01, 2018  9:45 AM CDT   Lab with UC LAB   [facility])    [address] [place] [address]  1st Floor  [facility] [address] [phone]            Oct 01, 2018 10:20 AM CDT   CT CHEST ABDOMEN PELVIS W/O & W CONTRAST with UCCT1   [facility] CT ([facility])    [address] [place] [address]  [address] [facility] [address] [phone]           Please bring any scans or X-rays taken at other  [facility], if similar tests were done. Also bring a list of your medicines, including vitamins, minerals and over-the-counter drugs. It is safest to leave personal items at home.  Be sure to tell your doctor:   If you have any allergies.   If there s any chance you are pregnant.   If you are breastfeeding.  How to prepare:   Do not eat or drink for 2 hours before your exam. If you need to take medicine, you may take it with small sips of water. (We may ask you to take liquid medicine as well.)   Please wear loose clothing, such as a sweat suit or jogging clothes. Avoid snaps, zippers and other metal. We may ask you to undress and put on a hospital gown.  Please arrive 30 minutes early for your CT. Once in the department you might be asked to drink water 15-20 minutes prior to your exam.  If indicated you may be asked to drink an oral contrast in advance of your CT.  If this is the case, the imaging team will let you know or be in contact with you prior to your appointment  Patients over 70 or patients with diabetes or kidney problems:   If you haven t had a blood test (creatinine test) within the last 30 days, the Cardiologist/Radiologist may require you to get this test prior to your exam.  If you have diabetes:   Continue to take your metformin medication on the day of your exam  If you have any questions, please call the Imaging Department where you will have your exam.            Oct 02, 2018 11:10 AM CDT   (Arrive by 10:55 AM)   Return Visit with MARIA LUISA Lackey CNP   [facility] ([facility])    [address]  [address]  [facility] [address] [phone]            Nov 20, 2018 11:00 AM CST   US CAROTID BILATERAL with UCUSV2   [facility] ([facility])    [address]  1st Floor  [facility] [address] [phone]           Please bring a list of your medicines (including vitamins, minerals and  over-the-counter drugs). Also, tell your doctor about any allergies you may have. Wear comfortable clothes and leave your valuables at home.  You do not need to do anything special to prepare for your exam.  Please call the Imaging Department at your exam site with any questions.            Nov 27, 2018  1:00 PM CST   (Arrive by 12:45 PM)   Return Vascular Visit with Frida Desai MD   [facility])    [address] [place] [address]  [address] [place] [address] [phone]              Who to contact     Please call your clinic at [phone] to:    Ask questions about your health    Make or cancel appointments    Discuss your medicines    Learn about your test results    Speak to your doctor            Additional Information About Your Visit        [employer] Information     [employer]) gives you secure access to your electronic health record. If you see a primary care provider, you can also send messages to your care team and make appointments. If you have questions, please call your primary care clinic.  If you do not have a primary care provider, please call [phone] and they will assist you.      [employer] ([employer]) is an electronic gateway that provides easy, online access to your medical records. With [employer]), you can request a clinic appointment, read your test results, renew a prescription or communicate with your care team.     To access your existing account, please contact your [facility] or call [phone] for assistance.        Care EveryWhere ID     This is your Care EveryWhere ID. This could be used by other organizations to access your [facility] medical records  [id number]         Blood Pressure from Last 3 Encounters:   05/29/18 124/65   05/21/18 120/79   04/02/18 119/62    Weight from Last 3 Encounters:   05/29/18 67.6 kg (149 lb 1.6 oz)   04/02/18 66.2 kg (145 lb 14.4 oz)   03/27/18 66.8 kg (147 lb 3.2 oz)              Today,  you had the following     No orders found for display         Today's Medication Changes          These changes are accurate as of 6/5/18 11:59 PM.  If you have any questions, ask your nurse or doctor.               These medicines have changed or have updated prescriptions.        Dose/Directions    * warfarin 5 MG tablet   Commonly known as:  COUMADIN   This may have changed:  Another medication with the same name was added. Make sure you understand how and when to take each.   Used for:  Atrial flutter (H)   Changed by:  Ciro Sharp RN        7.5 mg on Wed; 5 mg all other days  or as instructed by coumadin clinic.   Quantity:  35 tablet   Refills:  5       * warfarin 5 MG tablet   Commonly known as:  COUMADIN   This may have changed:  You were already taking a medication with the same name, and this prescription was added. Make sure you understand how and when to take each.   Used for:  Paroxysmal atrial fibrillation (H), Long-term (current) use of anticoagulants   Changed by:  Ciro Sharp RN        Take 1 tablet by mouth daily or as directed by the Coumadin clinic.   Quantity:  90 tablet   Refills:  3       * Notice:  This list has 2 medication(s) that are the same as other medications prescribed for you. Read the directions carefully, and ask your doctor or other care provider to review them with you.         Where to get your medicines      These medications were sent to [facility] [address]  [address] [place] [address] [address] [facility] [address]     Phone:  [phone]     warfarin 5 MG tablet                Primary Care Provider Office Phone # Fax #    Roberto Sarmiento [phone]       [address] [place] [address]  [facility] [address]        Equal Access to Services     LEE CASTILLO AH: jayashree Mace, sil irene. So [facility]  [phone].    ATENCIÓN: Si perico carl, tiene a aguirre disposición servicios gratuitos de asistencia lingüística. Lauren galicia [phone].    We comply with applicable federal civil rights laws and [place] laws. We do not discriminate on the basis of race, color, national origin, age, disability, sex, sexual orientation, or gender identity.            Thank you!     Thank you for choosing [facility]  for your care. Our goal is always to provide you with excellent care. Hearing back from our patients is one way we can continue to improve our services. Please take a few minutes to complete the written survey that you may receive in the mail after your visit with us. Thank you!             Your Updated Medication List - Protect others around you: Learn how to safely use, store and throw away your medicines at www.disposemymeds.org.          This list is accurate as of 6/5/18 11:59 PM.  Always use your most recent med list.                   Brand Name Dispense Instructions for use Diagnosis    aspirin 81 MG tablet      Take 1 tablet by mouth daily.        atorvastatin 40 MG tablet    LIPITOR    100 tablet    Take 1 tablet (40 mg) by mouth daily    Hyperlipidemia, unspecified hyperlipidemia type       bacitracin ointment     28 g    Apply topically 2 times daily APPLY TO LEFT LEG TWO TIMES PER DAY    Left leg cellulitis       Blood Pressure Monitor Kit     1 kit    1 Units daily    Hypertension       CENTRUM SILVER per tablet      Take 1 tablet by mouth daily. AM        ciclopirox 0.77 % cream    LOPROX    90 g    Apply topically 2 times daily To feet and toenails.    Dermatophytosis of nail, Tinea pedis of both feet       cyanocobalamin 1000 MCG tablet    vitamin  B-12    180 tablet    Take 2 tablets (2,000 mcg) by mouth daily    Anemia       doxazosin 2 MG tablet    CARDURA    90 tablet    Take 1/2 tab in the morning and 1/2 tab in the evening    Essential hypertension       fluticasone 50 MCG/ACT spray     FLONASE    3 Package    Spray 2 sprays into both nostrils daily    Unspecified sinusitis (chronic)       ketoconazole 2 % cream    NIZORAL    60 g    Apply topically daily On hold    Tinea corporis       loratadine 10 MG tablet    CLARITIN     Take 10 mg by mouth as needed        metoprolol tartrate 25 MG tablet    LOPRESSOR    180 tablet    Take 1 tablet (25 mg) by mouth 2 times daily    Coronary artery disease involving native heart without angina pectoris, unspecified vessel or lesion type       mirtazapine 15 MG tablet    REMERON     Take 15 mg by mouth At Bedtime.        order for DME     1 Units    20-30 mm Hg knee length compression stockings.  Measure and fit.  Style and color per patient preference.  Doff n Aracelis per patient need.    PVD (peripheral vascular disease) (H), Ulcer of left lower extremity, limited to breakdown of skin (H)       triamcinolone 0.1 % cream    KENALOG    80 g    Apply topically 2 times daily For up to two weeks in a row    Atopic eczema       * warfarin 5 MG tablet    COUMADIN    35 tablet    7.5 mg on Wed; 5 mg all other days  or as instructed by coumadin clinic.    Atrial flutter (H)       * warfarin 5 MG tablet    COUMADIN    90 tablet    Take 1 tablet by mouth daily or as directed by the Coumadin clinic.    Paroxysmal atrial fibrillation (H), Long-term (current) use of anticoagulants       ZOLOFT 100 MG tablet   Generic drug:  sertraline      Take 100 mg by mouth every evening.        * Notice:  This list has 2 medication(s) that are the same as other medications prescribed for you. Read the directions carefully, and ask your doctor or other care provider to review them with you.

## 2018-06-05 NOTE — MR AVS SNAPSHOT
Noe Florence   6/5/2018   Anticoagulation Therapy Visit    Description:  83 year old male   Provider:  Ciro Sharp, RN   Department:  [facility]           INR as of 6/5/2018     Today's INR 2.64      Anticoagulation Summary as of 6/5/2018     INR goal 2.0-3.0   Today's INR 2.64   Full warfarin instructions 5 mg every day   Next INR check 7/3/2018    Indications   Atrial fibrillation (H) [I48.91] [I48.91]  Long-term (current) use of anticoagulants [Z79.01] [Z79.01]         June 2018 Details    Sun Mon Tue Wed Thu Fri Sat          1               2                 3               4               5      5 mg   See details      6      5 mg         7      5 mg         8      5 mg         9      5 mg           10      5 mg         11      5 mg         12      5 mg         13      5 mg         14      5 mg         15      5 mg         16      5 mg           17      5 mg         18      5 mg         19      5 mg         20      5 mg         21      5 mg         22      5 mg         23      5 mg           24      5 mg         25      5 mg         26      5 mg         27      5 mg         28      5 mg         29      5 mg         30      5 mg          Date Details   06/05 This INR check               How to take your warfarin dose     To take:  5 mg Take 1 of the 5 mg tablets.           July 2018 Details    Sun Mon Tue Wed Thu Fri Sat     1      5 mg         2      5 mg         3            4               5               6               7                 8               9               10               11               12               13               14                 15               16               17               18               19               20               21                 22               23               24               25               26               27               28                 29               30               31                    Date Details   No additional details     Date of next INR:  7/3/2018         How to take your warfarin dose     To take:  5 mg Take 1 of the 5 mg tablets.

## 2018-06-06 NOTE — PROGRESS NOTES
Outpatient Physical Therapy Discharge Note     Patient: Noe Florence  : 1935    Beginning/End Dates of Reporting Period:  2018 to 2018. He was seen for 18 visits during this episode of care. Treatment focused on stretching/strengthening of legs, posture, and gait/balance.    Referring Provider: DR Sarmiento    Therapy Diagnosis: Left foot drop, balance problem     Client Self Report: no falls. Has been walking to/from the bus without a problem. Has not done the HEP unless Rica is present and telling him to do the exercises.He is wearing his tennis shoes in the house and wearing the foot up device most of the time.     Objective Measurements:  Objective Measure: 25fTW at self selected speed 8.37 seconds and 15 steps. REpeat 8.85 seconds and 16 steps.   Details: His arms are swinging. He is wearing the foot up device.     Objective Measure: 25fTW at fast speed 7.13 seconds and 14 steps.   Details: gait quality for normal and fast speed: he is flexed at thoracic/cervical spine. Arms swing when walking. knees are not fully extended during gait.     Objective Measure: TUG 12.62 seconds and 12.66 seconds. No Ad. Wearing foot up on the left side.      Objective Measure: supine left knee extension is minus 15 degrees, right knee is minus 8 degrees.     Objective Measure: sit to stand from 22 inch ht surface without use of hands.Wide SARAH andarms out in front to use for momentum but not pushing. 6 reps in 30 seconds. 2018      Goals:  Goal Identifier gait speed/tolerance-MET   Goal Description Amb 25feet with appropriate AD and  for 5 minutes or longer at a speed of .8 m/s consistently for household/community mobility   Target Date 18   Date Met  18   Progress:     Goal Identifier adaptive equipment-MET    Goal Description He will identify if any adaptive equipment is helpful for transfers (car transfer bar, bed rail, toilet safety frame)   Target Date 18   Date Met  18    Progress:     Goal Identifier -HEP-partially met. He does NOT do the HEp unless Rica is present.    Goal Description They will be able to do HEP of stretches for posture, left leg and strengthening on a regular basis.to address some of his impaiments   Target Date 05/16/18   Date Met  06/06/18   Progress:     Goal Identifier foot up device-MET   Goal Description Pt to decide about possible purchase of a foot up device for community mobility   Target Date 05/13/18   Date Met  05/01/18   Progress:     Goal Identifier leg strength   Goal Description leg strength: sit to stand from 22 inch ht surface with no use of hands.  2 reps.   Target Date 06/07/18   Date Met  05/29/18   Progress:       Progress Toward Goals: Sha has made nice progress in PT over the past few months. His legs are stronger and not as tight. His balance is better. He is back to walking Independently to and from the bus regularly without a problem. Posture is still an issue. He freqeuntly falls asleep sitting in a chair with his trunk and neck flexed forward for hours thus he has thoracic kyphosis and forward head posture. His knees do not fully extend due to previous orthopedic injuries. He is unable to complete the HEP. He gets distracted by all the other things he wants to complete at home and/or forgets to do HEP thus Rica has to assist him always. She is working and is tired so she is frustrated with him not doing the HEP. They are both aware he is made good progress in PT and they want to return for more PT later this year.    Plan:Discharge from therapy.    Reason for Discharge: Patient has met all goals.    Equipment Issued: he ordered a foot up device for left side    Discharge Plan: He does not complete the HEP unless Rica is present to make him do the exs.    Nicolette Bauman DPT, MPT, NCS

## 2018-07-03 ENCOUNTER — OFFICE VISIT (OUTPATIENT)
Dept: NEUROLOGY | Facility: CLINIC | Age: 83
End: 2018-07-03
Payer: COMMERCIAL

## 2018-07-03 VITALS
WEIGHT: 144 LBS | DIASTOLIC BLOOD PRESSURE: 68 MMHG | HEART RATE: 68 BPM | HEIGHT: 68 IN | TEMPERATURE: 99 F | BODY MASS INDEX: 21.82 KG/M2 | SYSTOLIC BLOOD PRESSURE: 124 MMHG

## 2018-07-03 DIAGNOSIS — M54.17 L-S RADICULOPATHY: ICD-10-CM

## 2018-07-03 DIAGNOSIS — R26.9 GAIT DISORDER: Primary | ICD-10-CM

## 2018-07-03 DIAGNOSIS — G60.3 IDIOPATHIC PROGRESSIVE POLYNEUROPATHY: ICD-10-CM

## 2018-07-03 DIAGNOSIS — G57.32 PERONEAL NEUROPATHY AT KNEE, LEFT: ICD-10-CM

## 2018-07-03 ASSESSMENT — PAIN SCALES - GENERAL: PAINLEVEL: NO PAIN (0)

## 2018-07-03 NOTE — LETTER
7/3/2018       RE: Noe Florence  [address] [facility] [address]     Dear Colleague,    Thank you for referring your patient, Noe Florence, to the [facility] at [facility]. Please see a copy of my visit note below.    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 He has a left foot drop for many years. He is not sure when this started. 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 AlI initially saw him in March 2018 for balance difficulty.     Interval history:  I last saw him 3/27/2018. Since that time NCS were performed and showed a length dependant polyneuropathy and probably L4-S1 radiuclopathies. It was a complex study and there were other abnormalities picked up as well. A focal peroneal neuropathy could not be differentiated from the radiculopathies on the study. There was nothing on the study that looked like a demyelinating neuropathy. Overall he is about the same as at his last visit. He still has left foot dorsiflexion weakness, which is stable. He wears a support device on his foot, which he finds helpful. His balance is stable. No recent falls. He denies numbness or pain in his feet. No low back pain. He completed PT. He found it helpful, but has not kept up with the home exercise.     Prior pertinent laboratory work-up:  3/18: MAG negative. Urine IF showed no monoclonal protein.   1/18: Normal B12  2/17: Serum IF showed a very small monoclonal IgM immunoglobulin of kappa light chain type    Prior pertinent radiology work-up:  2015: CT LS spine showed degenerative changes resulting in mild to moderate spinal canal narrowing from L2-L5.    Prior electrophysiologic work-up:  6/14: Nerve conduction studies/EMG performed at [facility] showed a left common peroneal neuropathy vs L5 radiculopathy. There also were findings suggesting a motor predominant  polyneuropathy vs polyradiculopathy.   4/18: NCS/EMG showed multiple findings. See report for details.      Past Medical History:   Past Medical History:   Diagnosis Date     Advanced open-angle glaucoma      Antiplatelet or antithrombotic long-term use      Atrial fibrillation (H)      CKD (chronic kidney disease), stage III 2005     Colon cancer (H)     Stage II-B colon cancer     Coronary artery disease     s/p CABG x 2, JEREMY x 2     Diverticulitis      Hyperlipidemia      Hypertension      Ischemic cardiomyopathy      MGUS (monoclonal gammopathy of unknown significance)      Nonsenile cataract     BE     Pacemaker      Polymorphic ventricular tachycardia (H)      Polymyalgia rheumatica (H)      PVD (posterior vitreous detachment), both eyes 2005     S/P ablation of atrial flutter 6/20/14    CTI     Stented coronary artery      SVT (supraventricular tachycardia) (H)     PPM/AICD for NSVT     Upper leg DVT (deep venous thromboembolism), chronic (H)     Left     Weight loss, unintentional 2017    15 lb in 4 months     Past Surgical History:  Past Surgical History:   Procedure Laterality Date     C CABG, ARTERY-VEIN, FOUR  2006    LIMA-LAD, SVG-Rt PDA, SVG-OM2, SVG-Diag 1     C CABG, VEIN, SINGLE  1994    1-vessel CABG, SVG->PDRCA      CATARACT IOL, RT/LT Bilateral      COLONOSCOPY  3/13/2014    Procedure: COMBINED COLONOSCOPY, SINGLE BIOPSY/POLYPECTOMY BY BIOPSY;;  Surgeon: Mary Gerber MD;  Location: [facility]     COLONOSCOPY N/A 6/22/2015    Procedure: COLONOSCOPY;  Surgeon: Marilin Newman MD;  [facility]     H ABLATION ATRIAL FLUTTER       HEART CATH DRUG ELUTING STENT PLACEMENT  4/19/2012    JEREMY x 2 to LCx     IMPLANT IMPLANTABLE CARDIOVERTER DEFIBRILLATOR  5-    ppm/aicd     KNEE SURGERY      right and left knee surgeries     LAPAROSCOPIC ASSISTED COLECTOMY LEFT (DESCENDING)  4/8/2014    Procedure: LAPAROSCOPIC ASSISTED COLECTOMY LEFT (DESCENDING);  Hand assisted Laparoscopic Sigmoid  Colectomy , Laparoscopic mobilization of spleenic flexure *Latex Allergy*Anesthesia General with Block;  Surgeon: Chanelle Guzmán MD;  Location: [facility] OR     REPAIR VALVE MITRAL  2006     30-mm Medtronic Julian ring      SELECTIVE LASER TRABECULOPLASTY (SLT) OD (RIGHT EYE)  4/10, 1/12,+1/9/13    RE     SELECTIVE LASER TRABECULOPLASTY (SLT) OS (LEFT EYE)  5/2012     SHOULDER SURGERY      right rotator cuff     Family history:    There is no known family history of hereditary neuropathies or other neuromuscular disorders.    Social History:    He denies tobacco, alcohol, or illicit drug use.     Medical Allergies:     Allergies   Allergen Reactions     Latex Rash     Rash     Current Medications:   Current Outpatient Prescriptions   Medication     aspirin 81 MG tablet     bacitracin ointment     Blood Pressure Monitor KIT     cyanocobalamin (VITAMIN  B-12) 1000 MCG tablet     doxazosin (CARDURA) 2 MG tablet     metoprolol tartrate (LOPRESSOR) 25 MG tablet     mirtazapine (REMERON) 15 MG tablet     Multiple Vitamins-Minerals (CENTRUM SILVER) per tablet     order for DME     sertraline (ZOLOFT) 100 MG tablet     warfarin (COUMADIN) 5 MG tablet     atorvastatin (LIPITOR) 40 MG tablet     ciclopirox (LOPROX) 0.77 % cream     fluticasone (FLONASE) 50 MCG/ACT nasal spray     ketoconazole (NIZORAL) 2 % cream     loratadine (CLARITIN) 10 MG tablet     triamcinolone (KENALOG) 0.1 % cream     warfarin (COUMADIN) 5 MG tablet     No current facility-administered medications for this visit.      Facility-Administered Medications Ordered in Other Visits   Medication     glycopyrrolate (ROBINUL) injection     neostigmine (PROSTIGMINE) injection     Review of Systems: A complete review of systems was obtained and was negative except for what was noted above.    Physical examination:    /68 (BP Location: Right arm, Patient Position: Chair, Cuff Size: Adult Regular)  Pulse 68  Temp 99  F (37.2  C) (Temporal)  Ht  "1.727 m (5' 8\")  Wt 65.3 kg (144 lb)  BMI 21.9 kg/m2    General Appearance: NAD    Musculoskeletal:  Pes cavus present.     Neurologic examination:    Mental status:  Patient is alert, attentive, and oriented x 3.  Language is coherent and fluent without aphasia.  Memory, comprehension and ability to follow commands were intact.       Cranial nerves:  Extraocular movements were full. There was no face, jaw, palate or tongue weakness or atrophy.      Motor exam: No atrophy or fasciculations. Manual muscle testing revealed the following MRC grade muscle power:   Right Left   Neck flexion 5    Neck extension: 5    Shoulder abduction:  5 5   Elbow extension: 5 5   Elbow flexion:  5 5   Wrist flexion:  5 5   Wrist extension:  5 5   FDI 4 4   APB 4 4   Hip flexion 5 5   Knee extension 5 5   Dorsiflexion 5- 3   Plantar flexion 5 5     Complex motor skills: Mild postural hand tremor. No ataxia     Sensory exam: Pin remains decreased below mid leg. Pin loss is more dense in left peroneal distribution. Vibration reduced in toes > ankles.     Gait: Strides short with slightly broad base.     Deep tendon reflexes:   Right Left   Triceps 2 2   Biceps 2 2   Brachioradialis 2 2   Knee jerk 1-2 1-2   Ankle jerk 0 0   Plantar responses were flexor bilaterally.       Assessment:    Noe Florence is a 83 year old man with a multifactorial gait disorder with contributions from a length dependant polyneuropathy and probably L4-S1 radiculopathies and a left peroneal neuropathy. His condition is stable. There is no clinical or electrophysiologic of an evolving neuropathic condition. If he develops low back pain or there is worsening of his leg strength obtaining a CT myelogram is reasonable. For now management is supportive. All questions answered.     Plan:      1. Encouraged him to perform physical therapy exercises at home. If he would like a repeat course of PT he will let me know. I am happy to help him with this.   2. Continue use " of left ankle support device  3. Follow up in about 6 months.  ---        Again, thank you for allowing me to participate in the care of your patient.      Sincerely,    Jase Duarte MD

## 2018-07-03 NOTE — PROGRESS NOTES
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 He has a left foot drop for many years. He is not sure when this started. 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 AlI initially saw him in March 2018 for balance difficulty.     Interval history:  I last saw him 3/27/2018. Since that time NCS were performed and showed a length dependant polyneuropathy and probably L4-S1 radiuclopathies. It was a complex study and there were other abnormalities picked up as well. A focal peroneal neuropathy could not be differentiated from the radiculopathies on the study. There was nothing on the study that looked like a demyelinating neuropathy. Overall he is about the same as at his last visit. He still has left foot dorsiflexion weakness, which is stable. He wears a support device on his foot, which he finds helpful. His balance is stable. No recent falls. He denies numbness or pain in his feet. No low back pain. He completed PT. He found it helpful, but has not kept up with the home exercise.     Prior pertinent laboratory work-up:  3/18: MAG negative. Urine IF showed no monoclonal protein.   1/18: Normal B12  2/17: Serum IF showed a very small monoclonal IgM immunoglobulin of kappa light chain type    Prior pertinent radiology work-up:  2015: CT LS spine showed degenerative changes resulting in mild to moderate spinal canal narrowing from L2-L5.    Prior electrophysiologic work-up:  6/14: Nerve conduction studies/EMG performed at [facility] showed a left common peroneal neuropathy vs L5 radiculopathy. 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   4/18: NCS/EMG showed multiple findings. See report for details.      Past Medical History:   Past Medical History:   Diagnosis Date     Advanced open-angle glaucoma      Antiplatelet or antithrombotic long-term use      Atrial fibrillation (H)       CKD (chronic kidney disease), stage III 2005     Colon cancer (H)     Stage II-B colon cancer     Coronary artery disease     s/p CABG x 2, JEREMY x 2     Diverticulitis      Hyperlipidemia      Hypertension      Ischemic cardiomyopathy      MGUS (monoclonal gammopathy of unknown significance)      Nonsenile cataract     BE     Pacemaker      Polymorphic ventricular tachycardia (H)      Polymyalgia rheumatica (H)      PVD (posterior vitreous detachment), both eyes 2005     S/P ablation of atrial flutter 6/20/14    CTI     Stented coronary artery      SVT (supraventricular tachycardia) (H)     PPM/AICD for NSVT     Upper leg DVT (deep venous thromboembolism), chronic (H)     Left     Weight loss, unintentional 2017    15 lb in 4 months     Past Surgical History:  Past Surgical History:   Procedure Laterality Date     C CABG, ARTERY-VEIN, FOUR  2006    LIMA-LAD, SVG-Rt PDA, SVG-OM2, SVG-Diag 1     C CABG, VEIN, SINGLE  1994    1-vessel CABG, SVG->PDRCA      CATARACT IOL, RT/LT Bilateral      COLONOSCOPY  3/13/2014    Procedure: COMBINED COLONOSCOPY, SINGLE BIOPSY/POLYPECTOMY BY BIOPSY;;  Surgeon: Mary Gerber MD;  Location:  GI     COLONOSCOPY N/A 6/22/2015    Procedure: COLONOSCOPY;  Surgeon: Marilin Newman MD;  Location:  GI     H ABLATION ATRIAL FLUTTER       HEART CATH DRUG ELUTING STENT PLACEMENT  4/19/2012    JEREMY x 2 to LCx     IMPLANT IMPLANTABLE CARDIOVERTER DEFIBRILLATOR  5-    ppm/aicd     KNEE SURGERY      right and left knee surgeries     LAPAROSCOPIC ASSISTED COLECTOMY LEFT (DESCENDING)  4/8/2014    Procedure: LAPAROSCOPIC ASSISTED COLECTOMY LEFT (DESCENDING);  Hand assisted Laparoscopic Sigmoid Colectomy , Laparoscopic mobilization of spleenic flexure *Latex Allergy*Anesthesia General with Block;  Surgeon: Chanelle Guzmán MD;  Location:  OR     REPAIR VALVE MITRAL  2006     30-mm Medtronic Julian ring      SELECTIVE LASER TRABECULOPLASTY (SLT) OD (RIGHT EYE)   "4/10, 1/12,+1/9/13    RE     SELECTIVE LASER TRABECULOPLASTY (SLT) OS (LEFT EYE)  5/2012     SHOULDER SURGERY      right rotator cuff     Family history:    There is no known family history of hereditary neuropathies or other neuromuscular disorders.    Social History:    He denies tobacco, alcohol, or illicit drug use.     Medical Allergies:     Allergies   Allergen Reactions     Latex Rash     Rash     Current Medications:   Current Outpatient Prescriptions   Medication     aspirin 81 MG tablet     bacitracin ointment     Blood Pressure Monitor KIT     cyanocobalamin (VITAMIN  B-12) 1000 MCG tablet     doxazosin (CARDURA) 2 MG tablet     metoprolol tartrate (LOPRESSOR) 25 MG tablet     mirtazapine (REMERON) 15 MG tablet     Multiple Vitamins-Minerals (CENTRUM SILVER) per tablet     order for DME     sertraline (ZOLOFT) 100 MG tablet     warfarin (COUMADIN) 5 MG tablet     atorvastatin (LIPITOR) 40 MG tablet     ciclopirox (LOPROX) 0.77 % cream     fluticasone (FLONASE) 50 MCG/ACT nasal spray     ketoconazole (NIZORAL) 2 % cream     loratadine (CLARITIN) 10 MG tablet     triamcinolone (KENALOG) 0.1 % cream     warfarin (COUMADIN) 5 MG tablet     No current facility-administered medications for this visit.      Facility-Administered Medications Ordered in Other Visits   Medication     glycopyrrolate (ROBINUL) injection     neostigmine (PROSTIGMINE) injection     Review of Systems: A complete review of systems was obtained and was negative except for what was noted above.    Physical examination:    /68 (BP Location: Right arm, Patient Position: Chair, Cuff Size: Adult Regular)  Pulse 68  Temp 99  F (37.2  C) (Temporal)  Ht 1.727 m (5' 8\")  Wt 65.3 kg (144 lb)  BMI 21.9 kg/m2    General Appearance: NAD    Musculoskeletal:  Pes cavus present.     Neurologic examination:    Mental status:  Patient is alert, attentive, and oriented x 3.  Language is coherent and fluent without aphasia.  Memory, comprehension " and ability to follow commands were intact.       Cranial nerves:  Extraocular movements were full. There was no face, jaw, palate or tongue weakness or atrophy.      Motor exam: No atrophy or fasciculations. Manual muscle testing revealed the following MRC grade muscle power:   Right Left   Neck flexion 5    Neck extension: 5    Shoulder abduction:  5 5   Elbow extension: 5 5   Elbow flexion:  5 5   Wrist flexion:  5 5   Wrist extension:  5 5   FDI 4 4   APB 4 4   Hip flexion 5 5   Knee extension 5 5   Dorsiflexion 5- 3   Plantar flexion 5 5     Complex motor skills: Mild postural hand tremor. No ataxia     Sensory exam: Pin remains decreased below mid leg. Pin loss is more dense in left peroneal distribution. Vibration reduced in toes > ankles.     Gait: Strides short with slightly broad base.     Deep tendon reflexes:   Right Left   Triceps 2 2   Biceps 2 2   Brachioradialis 2 2   Knee jerk 1-2 1-2   Ankle jerk 0 0   Plantar responses were flexor bilaterally.       Assessment:    Noe Florence is a 83 year old man with a multifactorial gait disorder with contributions from a length dependant polyneuropathy and probably L4-S1 radiculopathies and a left peroneal neuropathy. His condition is stable. There is no clinical or electrophysiologic of an evolving neuropathic condition. If he develops low back pain or there is worsening of his leg strength obtaining a CT myelogram is reasonable. For now management is supportive. All questions answered.     Plan:      1. Encouraged him to perform physical therapy exercises at home. If he would like a repeat course of PT he will let me know. I am happy to help him with this.   2. Continue use of left ankle support device  3. Follow up in about 6 months.  ---

## 2018-07-03 NOTE — MR AVS SNAPSHOT
After Visit Summary   7/3/2018    Noe Florence    MRN: [id number]           Patient Information     Date Of Birth          1935        Visit Information        Provider Department      7/3/2018 12:30 PM Jase Duarte MD [facility]UROS[facility]CIALT[facility]        Today's Diagnoses     Gait disorder    -  1       Follow-ups after your visit        Follow-up notes from your care team     Return in about 6 months (around 1/3/2019).      Your next 10 appointments already scheduled     Aug 02, 2018  1:30 PM CDT   (Arrive by 1:15 PM)   Implanted Defibulator with  Cv Device 1   [facility] ([facility])    [address] [place] [address]  [address] [place] [id number] [phone]            Aug 02, 2018  2:00 PM CDT   (Arrive by 1:45 PM)   RETURN ARRHYTHMIA with MARIA LUISA Rondon [facility] ([facility])    [address] [place] [address]  [address] [place] [id number] [phone]            Aug 06, 2018 10:00 AM CDT   (Arrive by 9:45 AM)   Return Visit with Roberto Sarmiento MD   [facility] ([facility])    [address] [place] [address]  4th Floor  [facility] [id number] [phone]            Oct 01, 2018  9:45 AM CDT   Lab with  LAB   [facility] ([facility])    [address] [place] [address]  1st Floor  [facility] [id number] [phone]            Oct 01, 2018 10:20 AM CDT   CT CHEST ABDOMEN PELVIS W/O & W CONTRAST with UCCT1   [facility] ([facility])    [address] [place] [address]  1st Floor  [facility] [address] [phone]           Please bring any scans or X-rays taken at other hospitals, if similar tests were done. Also bring a list of your medicines, including vitamins, minerals and over-the-counter drugs. It is safest to leave personal items at home.  Be sure to tell your doctor:   If you have any allergies.    If there s any chance you are pregnant.   If you are breastfeeding.  How to prepare:   Do not eat or drink for 2 hours before your exam. If you need to take medicine, you may take it with small sips of water. (We may ask you to take liquid medicine as well.)   Please wear loose clothing, such as a sweat suit or jogging clothes. Avoid snaps, zippers and other metal. We may ask you to undress and put on a hospital gown.  Please arrive 30 minutes early for your CT. Once in the department you might be asked to drink water 15-20 minutes prior to your exam.  If indicated you may be asked to drink an oral contrast in advance of your CT.  If this is the case, the imaging team will let you know or be in contact with you prior to your appointment  Patients over 70 or patients with diabetes or kidney problems:   If you haven t had a blood test (creatinine test) within the last 30 days, the Cardiologist/Radiologist may require you to get this test prior to your exam.  If you have diabetes:   Continue to take your metformin medication on the day of your exam  If you have any questions, please call the Imaging Department where you will have your exam.            Oct 02, 2018 11:10 AM CDT   (Arrive by 10:55 AM)   Return Visit with MARIA LUISA Lackey CNP   [facility] ([facility])    [address] [place] [address]  [address]  [facility] [id number] [phone]            Nov 20, 2018 11:00 AM CST   US CAROTID BILATERAL with UCUSV2   [facility] ([facility])    [address] [place] [address] Se  1st Floor  [facility] [id number] [phone]           Please bring a list of your medicines (including vitamins, minerals and over-the-counter drugs). Also, tell your doctor about any allergies you may have. Wear comfortable clothes and leave your valuables at home.  You do not need to do anything special to prepare for your exam.  Please call the Imaging Department  "at your exam site with any questions.            Nov 27, 2018  1:00 PM CST   (Arrive by 12:45 PM)   Return Vascular Visit with Frida Desai MD   [facility] ([facility])    [address]  [address]  [facility] [id number] [phone]            Jan 08, 2019 12:30 PM CST   Return Neuropathy Visit with Jase Duarte MD   [facility] NEUROSPECIALTIES ([facility])    [address]  [address]  [facility] [address] [phone]              Who to contact     Please call your clinic at [phone] to:    Ask questions about your health    Make or cancel appointments    Discuss your medicines    Learn about your test results    Speak to your doctor            Additional Information About Your Visit        [employer] Information     [employer] gives you secure access to your electronic health record. If you see a primary care provider, you can also send messages to your care team and make appointments. If you have questions, please call your primary care clinic.  If you do not have a primary care provider, please call [phone] and they will assist you.      [employer] is an electronic gateway that provides easy, online access to your medical records. With [employer], you can request a clinic appointment, read your test results, renew a prescription or communicate with your care team.     To access your existing account, please contact your [facility] or call [phone] for assistance.        Care EveryWhere ID     This is your Care EveryWhere ID. This could be used by other organizations to access your [facility] medical records  [id number]        Your Vitals Were     Pulse Temperature Height BMI (Body Mass Index)          68 99  F (37.2  C) (Temporal) 5' 8\" (172.7 cm) 21.9 kg/m2         Blood Pressure from Last 3 Encounters:   07/03/18 124/68   05/29/18 124/65   05/21/18 120/79    Weight from Last 3 Encounters:   07/03/18 144 " lb (65.3 kg)   05/29/18 149 lb 1.6 oz (67.6 kg)   04/02/18 145 lb 14.4 oz (66.2 kg)              Today, you had the following     No orders found for display         Today's Medication Changes          These changes are accurate as of 7/3/18  1:10 PM.  If you have any questions, ask your nurse or doctor.               These medicines have changed or have updated prescriptions.        Dose/Directions    * warfarin 5 MG tablet   Commonly known as:  COUMADIN   This may have changed:  additional instructions   Used for:  Atrial flutter (H)        7.5 mg on Wed; 5 mg all other days  or as instructed by coumadin clinic.   Quantity:  35 tablet   Refills:  5       * warfarin 5 MG tablet   Commonly known as:  COUMADIN   This may have changed:  Another medication with the same name was changed. Make sure you understand how and when to take each.   Used for:  Paroxysmal atrial fibrillation (H), Long-term (current) use of anticoagulants        Take 1 tablet by mouth daily or as directed by the Coumadin clinic.   Quantity:  90 tablet   Refills:  3       * Notice:  This list has 2 medication(s) that are the same as other medications prescribed for you. Read the directions carefully, and ask your doctor or other care provider to review them with you.             Primary Care Provider Office Phone # Fax #    Roberto Sarmiento [phone]       [address] [place] [address]        Equal Access to Services     LEE CASTILLO AH: Delilah huston Solali, waaxda luqmj, qaybta kaalmada aicha, sil minor. So [facility] [phone].    ATENCIÓN: Si habla español, tiene a aguirre disposición servicios gratuitos de asistencia lingüística. Lauren al [phone].    We comply with applicable federal civil rights laws and [place] laws. We do not discriminate on the basis of race, color, national origin, age, disability, sex, sexual orientation, or gender identity.             Thank you!     Thank you for choosing [facility] NEUROSPECIALTIES  for your care. Our goal is always to provide you with excellent care. Hearing back from our patients is one way we can continue to improve our services. Please take a few minutes to complete the written survey that you may receive in the mail after your visit with us. Thank you!             Your Updated Medication List - Protect others around you: Learn how to safely use, store and throw away your medicines at www.disposemymeds.org.          This list is accurate as of 7/3/18  1:10 PM.  Always use your most recent med list.                   Brand Name Dispense Instructions for use Diagnosis    aspirin 81 MG tablet      Take 1 tablet by mouth daily.        atorvastatin 40 MG tablet    LIPITOR    100 tablet    Take 1 tablet (40 mg) by mouth daily    Hyperlipidemia, unspecified hyperlipidemia type       bacitracin ointment     28 g    Apply topically 2 times daily APPLY TO LEFT LEG TWO TIMES PER DAY    Left leg cellulitis       Blood Pressure Monitor Kit     1 kit    1 Units daily    Hypertension       CENTRUM SILVER per tablet      Take 1 tablet by mouth daily. AM        ciclopirox 0.77 % cream    LOPROX    90 g    Apply topically 2 times daily To feet and toenails.    Dermatophytosis of nail, Tinea pedis of both feet       cyanocobalamin 1000 MCG tablet    vitamin  B-12    180 tablet    Take 2 tablets (2,000 mcg) by mouth daily    Anemia       doxazosin 2 MG tablet    CARDURA    90 tablet    Take 1/2 tab in the morning and 1/2 tab in the evening    Essential hypertension       fluticasone 50 MCG/ACT spray    FLONASE    3 Package    Spray 2 sprays into both nostrils daily    Unspecified sinusitis (chronic)       ketoconazole 2 % cream    NIZORAL    60 g    Apply topically daily On hold    Tinea corporis       loratadine 10 MG tablet    CLARITIN     Take 10 mg by mouth as needed        metoprolol tartrate 25 MG tablet    LOPRESSOR    180 tablet    Take 1  tablet (25 mg) by mouth 2 times daily    Coronary artery disease involving native heart without angina pectoris, unspecified vessel or lesion type       mirtazapine 15 MG tablet    REMERON     Take 15 mg by mouth At Bedtime.        order for DME     1 Units    20-30 mm Hg knee length compression stockings.  Measure and fit.  Style and color per patient preference.  Doff n Aracelis per patient need.    PVD (peripheral vascular disease) (H), Ulcer of left lower extremity, limited to breakdown of skin (H)       triamcinolone 0.1 % cream    KENALOG    80 g    Apply topically 2 times daily For up to two weeks in a row    Atopic eczema       * warfarin 5 MG tablet    COUMADIN    35 tablet    7.5 mg on Wed; 5 mg all other days  or as instructed by coumadin clinic.    Atrial flutter (H)       * warfarin 5 MG tablet    COUMADIN    90 tablet    Take 1 tablet by mouth daily or as directed by the Coumadin clinic.    Paroxysmal atrial fibrillation (H), Long-term (current) use of anticoagulants       ZOLOFT 100 MG tablet   Generic drug:  sertraline      Take 100 mg by mouth every evening.        * Notice:  This list has 2 medication(s) that are the same as other medications prescribed for you. Read the directions carefully, and ask your doctor or other care provider to review them with you.

## 2018-07-06 DIAGNOSIS — Z79.01 LONG-TERM (CURRENT) USE OF ANTICOAGULANTS: ICD-10-CM

## 2018-07-06 DIAGNOSIS — I48.0 PAROXYSMAL ATRIAL FIBRILLATION (H): ICD-10-CM

## 2018-07-06 LAB — INR PPP: 1.83 (ref 0.86–1.14)

## 2018-07-09 ENCOUNTER — ANTICOAGULATION THERAPY VISIT (OUTPATIENT)
Dept: ANTICOAGULATION | Facility: CLINIC | Age: 83
End: 2018-07-09

## 2018-07-09 DIAGNOSIS — I48.0 PAROXYSMAL ATRIAL FIBRILLATION (H): ICD-10-CM

## 2018-07-09 DIAGNOSIS — Z79.01 LONG-TERM (CURRENT) USE OF ANTICOAGULANTS: ICD-10-CM

## 2018-07-09 NOTE — PROGRESS NOTES
ANTICOAGULATION FOLLOW-UP CLINIC VISIT    Patient Name:  Noe Florence  Date:  7/9/2018  Contact Type:  Telephone    SUBJECTIVE:     Patient Findings     Positives Unexplained INR or factor level change    Comments Pt reports that he eats a salad now and then, but he tries to keep this consistent. He doesn't think this is the cause for a decreased INR           OBJECTIVE    INR   Date Value Ref Range Status   07/06/2018 1.83 (H) 0.86 - 1.14 Final       ASSESSMENT / PLAN  INR assessment SUB    Recheck INR In: 3 WEEKS    INR Location Clinic      Anticoagulation Summary as of 7/9/2018     INR goal 2.0-3.0   Today's INR 1.83! (7/6/2018)   Warfarin maintenance plan 5 mg (5 mg x 1) every day   Full warfarin instructions 7/9: 7.5 mg; Otherwise 5 mg every day   Weekly warfarin total 35 mg   Plan last modified Nguyen Zuinga, RN (12/21/2017)   Next INR check 7/27/2018   Priority INR   Target end date Indefinite    Indications   Atrial fibrillation (H) [I48.91] [I48.91]  Long-term (current) use of anticoagulants [Z79.01] [Z79.01]         Anticoagulation Episode Summary     INR check location     Preferred lab     Send INR reminders to [facility]    Comments Patient contact number: [phone]   Can leave results with Rica (friend)      Anticoagulation Care Providers     Provider Role Specialty Phone number    Deedee Baird MD [facility] Cardiology [phone]            See the Encounter Report to view Anticoagulation Flowsheet and Dosing Calendar (Go to Encounters tab in chart review, and find the Anticoagulation Therapy Visit)    Spoke with patient. Gave them their lab results and new warfarin recommendation.  No changes in health, medication, or diet. No missed doses, no falls. No signs or symptoms of bleed or clotting.     If next INR is within goal range pt would like to go back to 5-6 weeks between labs.    Nguyen Zuniga, BYRNO             Addendum 7/9/18  Patient's friend Rica called back to say that  Noe's left eye looks bloody. I recommended that they go to an Urgent care to be examined. No apparent reason for the red eye.  Jeremy Ho [facility]

## 2018-07-09 NOTE — MR AVS SNAPSHOT
Noe Florence   7/9/2018   Anticoagulation Therapy Visit    Description:  83 year old male   Provider:  Nguyen Zuniga, RN   Department:  [facility]           INR as of 7/9/2018     Today's INR 1.83! (7/6/2018)      Anticoagulation Summary as of 7/9/2018     INR goal 2.0-3.0   Today's INR 1.83! (7/6/2018)   Full warfarin instructions 7/9: 7.5 mg; Otherwise 5 mg every day   Next INR check 7/27/2018    Indications   Atrial fibrillation (H) [I48.91] [I48.91]  Long-term (current) use of anticoagulants [Z79.01] [Z79.01]         July 2018 Details    Sun Mon Tue Wed Thu Fri Sat     1               2               3               4               5               6               7                 8               9      7.5 mg   See details      10      5 mg         11      5 mg         12      5 mg         13      5 mg         14      5 mg           15      5 mg         16      5 mg         17      5 mg         18      5 mg         19      5 mg         20      5 mg         21      5 mg           22      5 mg         23      5 mg         24      5 mg         25      5 mg         26      5 mg         27            28                 29               30               31                    Date Details   07/09 This INR check       Date of next INR:  7/27/2018         How to take your warfarin dose     To take:  5 mg Take 1 of the 5 mg tablets.    To take:  7.5 mg Take 1.5 of the 5 mg tablets.

## 2018-07-16 ENCOUNTER — TELEPHONE (OUTPATIENT)
Dept: CARDIOLOGY | Facility: CLINIC | Age: 83
End: 2018-07-16

## 2018-07-16 NOTE — TELEPHONE ENCOUNTER
[facility]    Phone Message    May a detailed message be left on voicemail: yes    Reason for Call: Other: Patient called because he received a letter to schedule his annual exam with Dr. Baird. Appointment has been scheduled, with a device check prior to the appointment. Patient was concerned about the timing of the device check, wanted it done in plenty of time for appointment. He is wondering if it should be done a few days sooner. Also, please verify if patient needs to have labs done that day as well. Please call patient. Thank you.     Action Taken: Message routed to:  [facility] ([facility] Cardiology

## 2018-07-16 NOTE — TELEPHONE ENCOUNTER
Called and spoke with Pt in regards to device check timing.  Informed Pt it is acceptable to complete device checks directly prior to his provider appointment.  Results and any pertinent information will be relayed to the provider.  Pt verbalized understanding and agreed to current plan.    Sana Luke LPN

## 2018-08-02 ENCOUNTER — ALLIED HEALTH/NURSE VISIT (OUTPATIENT)
Dept: CARDIOLOGY | Facility: CLINIC | Age: 83
End: 2018-08-02
Attending: NURSE PRACTITIONER
Payer: COMMERCIAL

## 2018-08-02 VITALS
BODY MASS INDEX: 21.69 KG/M2 | SYSTOLIC BLOOD PRESSURE: 129 MMHG | DIASTOLIC BLOOD PRESSURE: 81 MMHG | WEIGHT: 143.1 LBS | HEART RATE: 65 BPM | OXYGEN SATURATION: 100 % | HEIGHT: 68 IN

## 2018-08-02 DIAGNOSIS — Z95.0 CARDIAC PACEMAKER IN SITU: Primary | ICD-10-CM

## 2018-08-02 DIAGNOSIS — I25.5 ISCHEMIC CARDIOMYOPATHY: Primary | ICD-10-CM

## 2018-08-02 DIAGNOSIS — I48.0 PAROXYSMAL ATRIAL FIBRILLATION (H): ICD-10-CM

## 2018-08-02 DIAGNOSIS — I25.10 CORONARY ARTERY DISEASE INVOLVING NATIVE CORONARY ARTERY, ANGINA PRESENCE UNSPECIFIED, UNSPECIFIED WHETHER NATIVE OR TRANSPLANTED HEART: ICD-10-CM

## 2018-08-02 PROCEDURE — 99214 OFFICE O/P EST MOD 30 MIN: CPT | Mod: 25 | Performed by: NURSE PRACTITIONER

## 2018-08-02 PROCEDURE — G0463 HOSPITAL OUTPT CLINIC VISIT: HCPCS | Mod: 25,ZF

## 2018-08-02 PROCEDURE — 93283 PRGRMG EVAL IMPLANTABLE DFB: CPT | Mod: ZF

## 2018-08-02 PROCEDURE — 93283 PRGRMG EVAL IMPLANTABLE DFB: CPT | Mod: 26 | Performed by: INTERNAL MEDICINE

## 2018-08-02 ASSESSMENT — PAIN SCALES - GENERAL: PAINLEVEL: NO PAIN (0)

## 2018-08-02 NOTE — PATIENT INSTRUCTIONS
Cardiology Provider you saw in clinic today: Jaye GLASS, NP-C    Medication Changes:      Labs/Tests needed:       Follow-up Visit:      Further Instructions:  Call the device clinic if you note an alarm.       You will receive all normal lab and testing results via [employer] or mail if not reviewed in clinic today. Please contact our office if you need assistance with setting up MyChart.    If you need a medication refill please contact your pharmacy. Please allow 3 business days for your refill to be completed.     As always, thank you for trusting us with your health care needs!    If you have any questions regarding your visit please contact your care team:   Cardiology  Telephone Number    EP RN  Electrophysiology Nurse Coordinator [phone]     Call for EP procedure scheduling concerns  PAYTON Sanchez  [phone]           Device Clinic (Pacemakers, ICDs, Loop)   RN's : Trena Hernandez Connie, Dawn During business hours: [phone]    After business hours:   [phone]- select option 4 and ask for job code [phone].

## 2018-08-02 NOTE — MR AVS SNAPSHOT
After Visit Summary   8/2/2018    Noe Florence    MRN: [id number]           Patient Information     Date Of Birth          1935        Visit Information        Provider Department      8/2/2018 2:00 PM Jaye Alexander APRN [facility]        Today's Diagnoses     Ischemic cardiomyopathy          Care Instructions    Cardiology Provider you saw in clinic today: Jaye GLASS, NP-C    Medication Changes:      Labs/Tests needed:       Follow-up Visit:      Further Instructions:  Call the device clinic if you note an alarm.       You will receive all normal lab and testing results via MyChart or mail if not reviewed in clinic today. Please contact our office if you need assistance with setting up MyChart.    If you need a medication refill please contact your pharmacy. Please allow 3 business days for your refill to be completed.     As always, thank you for trusting us with your health care needs!    If you have any questions regarding your visit please contact your care team:   Cardiology  Telephone Number    EP RN  Electrophysiology Nurse Coordinator [phone]     Call for EP procedure scheduling concerns  PAYTON Sanchez  [phone]           Device Clinic (Pacemakers, ICDs, Loop)   RN's : Trena Hernandez Connie, Dawn During business hours: [phone]    After business hours:   [phone]- select option 4 and ask for job code [id number].                  Follow-ups after your visit        Additional Services     Follow-Up with EP Cardiac  Advanced Practice Provider- 3 Months                 Your next 10 appointments already scheduled     Aug 06, 2018 10:00 AM CDT   (Arrive by 9:45 AM)   Return Visit with Roberto Sarmiento MD   [facility] ([facility])    [address] [place] [id number] [phone]            Oct 01, 2018  9:45 AM CDT   Lab with  LAB   [facility] ([facility]    [address] [place] [address]   [phone]            Oct 01, 2018 10:20 AM CDT   CT CHEST ABDOMEN PELVIS W/O & W CONTRAST with UCCT1   [facility] CT ([facility])    [address] [place] [address]   [phone]           Please bring any scans or X-rays taken at other hospitals, if similar tests were done. Also bring a list of your medicines, including vitamins, minerals and over-the-counter drugs. It is safest to leave personal items at home.  Be sure to tell your doctor:   If you have any allergies.   If there s any chance you are pregnant.   If you are breastfeeding.  How to prepare:   Do not eat or drink for 2 hours before your exam. If you need to take medicine, you may take it with small sips of water. (We may ask you to take liquid medicine as well.)   Please wear loose clothing, such as a sweat suit or jogging clothes. Avoid snaps, zippers and other metal. We may ask you to undress and put on a hospital gown.  Please arrive 30 minutes early for your CT. Once in the department you might be asked to drink water 15-20 minutes prior to your exam.  If indicated you may be asked to drink an oral contrast in advance of your CT.  If this is the case, the imaging team will let you know or be in contact with you prior to your appointment  Patients over 70 or patients with diabetes or kidney problems:   If you haven t had a blood test (creatinine test) within the last 30 days, the Cardiologist/Radiologist may require you to get this test prior to your exam.  If you have diabetes:   Continue to take your metformin medication on the day of your exam  If you have any questions, please call the Imaging Department where you will have your exam.            Oct 02, 2018 11:10 AM CDT   (Arrive by 10:55 AM)   Return Visit with MARIA LUISA Lackey CNP   [facility] ([facility])    [address]  [address]  [facility] [address]   [phone]            Nov 20, 2018 11:00 AM CST   US CAROTID BILATERAL with UCUSV2   [facility] US ([facility])    [address] [place] [address]  1st Floor  [facility] [id number] [phone]           Please bring a list of your medicines (including vitamins, minerals and over-the-counter drugs). Also, tell your doctor about any allergies you may have. Wear comfortable clothes and leave your valuables at home.  You do not need to do anything special to prepare for your exam.  Please call the Imaging Department at your exam site with any questions.            Nov 27, 2018  1:00 PM CST   (Arrive by 12:45 PM)   Return Vascular Visit with Frida Desai MD   [facility] ([facility])    [address] [place] [address]  [address]  [facility] [address]   [phone]            Dec 03, 2018 10:30 AM CST   (Arrive by 10:15 AM)   Implanted Defibulator with Uc Cv Device 1   [facility] ([facility])    [address] [place] [address]  [address]  [facility] [address]   [phone]            Dec 03, 2018  1:30 PM CST   (Arrive by 1:15 PM)   RETURN ARRHYTHMIA with Deedee Baird MD   [facility] ([facility])    [address] [place] [address]  [address]  [facility] [address]   [phone]            Jan 08, 2019 12:30 PM CST   Return Neuropathy Visit with Jase Duarte MD   [facility] ([facility])    [address]  [address]  [facility] [address]   [phone]              Future tests that were ordered for you today     Open Future Orders        Priority Expected Expires Ordered    Follow-Up with EP Cardiac  Advanced Practice Provider- 3 Months Routine 10/31/2018 1/29/2019 8/2/2018            Who to contact     If you have questions or need follow up information about today's clinic  "visit or your schedule please contact [facility] directly at [phone].  Normal or non-critical lab and imaging results will be communicated to you by MyChart, letter or phone within 4 business days after the clinic has received the results. If you do not hear from us within 7 days, please contact the clinic through [employer] or phone. If you have a critical or abnormal lab result, we will notify you by phone as soon as possible.  Submit refill requests through [employer] or call your pharmacy and they will forward the refill request to us. Please allow 3 business days for your refill to be completed.          Additional Information About Your Visit        [employer] Information     [employer] gives you secure access to your electronic health record. If you see a primary care provider, you can also send messages to your care team and make appointments. If you have questions, please call your primary care clinic.  If you do not have a primary care provider, please call [phone] and they will assist you.        Care EveryWhere ID     This is your Care EveryWhere ID. This could be used by other organizations to access your [facility] medical records  [id number]        Your Vitals Were     Pulse Height Pulse Oximetry BMI (Body Mass Index)          65 1.727 m (5' 8\") 100% 21.76 kg/m2         Blood Pressure from Last 3 Encounters:   08/02/18 129/81   07/03/18 124/68   05/29/18 124/65    Weight from Last 3 Encounters:   08/02/18 64.9 kg (143 lb 1.6 oz)   07/03/18 65.3 kg (144 lb)   05/29/18 67.6 kg (149 lb 1.6 oz)              We Performed the Following     Follow-Up with Cardiac Advanced Practice Provider          Today's Medication Changes          These changes are accurate as of 8/2/18  2:52 PM.  If you have any questions, ask your nurse or doctor.               These medicines have changed or have updated prescriptions.        Dose/Directions    * warfarin 5 MG tablet   Commonly known as:  COUMADIN   This may " have changed:  additional instructions   Used for:  Atrial flutter (H)        7.5 mg on Wed; 5 mg all other days  or as instructed by coumadin clinic.   Quantity:  35 tablet   Refills:  5       * warfarin 5 MG tablet   Commonly known as:  COUMADIN   This may have changed:  Another medication with the same name was changed. Make sure you understand how and when to take each.   Used for:  Paroxysmal atrial fibrillation (H), Long-term (current) use of anticoagulants        Take 1 tablet by mouth daily or as directed by the Coumadin clinic.   Quantity:  90 tablet   Refills:  3       * Notice:  This list has 2 medication(s) that are the same as other medications prescribed for you. Read the directions carefully, and ask your doctor or other care provider to review them with you.             Primary Care Provider Office Phone # Fax #    Roberto Sarmiento [phone]       [address] [place] [address]        Equal Access to Services     [facility] Hospital[facility] : Hadii giovanny salinaso Solali, waaxda luqadaha, qaybta kaalmada adeegyada, sil burden . So [facility] [phone].    ATENCIÓN: Si habla español, tiene a aguirre disposición servicios gratuitos de asistencia lingüística. Llame al [phone].    We comply with applicable federal civil rights laws and [place] laws. We do not discriminate on the basis of race, color, national origin, age, disability, sex, sexual orientation, or gender identity.            Thank you!     Thank you for choosing [facility]  for your care. Our goal is always to provide you with excellent care. Hearing back from our patients is one way we can continue to improve our services. Please take a few minutes to complete the written survey that you may receive in the mail after your visit with us. Thank you!             Your Updated Medication List - Protect others around you: Learn how to safely use, store and throw away your  medicines at www.disposemymeds.org.          This list is accurate as of 8/2/18  2:52 PM.  Always use your most recent med list.                   Brand Name Dispense Instructions for use Diagnosis    aspirin 81 MG tablet      Take 1 tablet by mouth daily.        atorvastatin 40 MG tablet    LIPITOR    100 tablet    Take 1 tablet (40 mg) by mouth daily    Hyperlipidemia, unspecified hyperlipidemia type       bacitracin ointment     28 g    Apply topically 2 times daily APPLY TO LEFT LEG TWO TIMES PER DAY    Left leg cellulitis       Blood Pressure Monitor Kit     1 kit    1 Units daily    Hypertension       CENTRUM SILVER per tablet      Take 1 tablet by mouth daily. AM        ciclopirox 0.77 % cream    LOPROX    90 g    Apply topically 2 times daily To feet and toenails.    Dermatophytosis of nail, Tinea pedis of both feet       cyanocobalamin 1000 MCG tablet    vitamin  B-12    180 tablet    Take 2 tablets (2,000 mcg) by mouth daily    Anemia       doxazosin 2 MG tablet    CARDURA    90 tablet    Take 1/2 tab in the morning and 1/2 tab in the evening    Essential hypertension       fluticasone 50 MCG/ACT spray    FLONASE    3 Package    Spray 2 sprays into both nostrils daily    Unspecified sinusitis (chronic)       ketoconazole 2 % cream    NIZORAL    60 g    Apply topically daily On hold    Tinea corporis       loratadine 10 MG tablet    CLARITIN     Take 10 mg by mouth as needed        metoprolol tartrate 25 MG tablet    LOPRESSOR    180 tablet    Take 1 tablet (25 mg) by mouth 2 times daily    Coronary artery disease involving native heart without angina pectoris, unspecified vessel or lesion type       mirtazapine 15 MG tablet    REMERON     Take 15 mg by mouth At Bedtime.        order for DME     1 Units    20-30 mm Hg knee length compression stockings.  Measure and fit.  Style and color per patient preference.  Doff n Aracelis per patient need.    PVD (peripheral vascular disease) (H), Ulcer of left lower  extremity, limited to breakdown of skin (H)       triamcinolone 0.1 % cream    KENALOG    80 g    Apply topically 2 times daily For up to two weeks in a row    Atopic eczema       VITAMIN D-3 PO      Take 1 tablet by mouth 2 times daily        * warfarin 5 MG tablet    COUMADIN    35 tablet    7.5 mg on Wed; 5 mg all other days  or as instructed by coumadin clinic.    Atrial flutter (H)       * warfarin 5 MG tablet    COUMADIN    90 tablet    Take 1 tablet by mouth daily or as directed by the Coumadin clinic.    Paroxysmal atrial fibrillation (H), Long-term (current) use of anticoagulants       ZOLOFT 100 MG tablet   Generic drug:  sertraline      Take 100 mg by mouth every evening.        * Notice:  This list has 2 medication(s) that are the same as other medications prescribed for you. Read the directions carefully, and ask your doctor or other care provider to review them with you.

## 2018-08-02 NOTE — NURSING NOTE
Chief Complaint   Patient presents with     Follow Up For     2 month EP follow up, device check prior     Vitals were taken and medications were reconciled.     Kiki Lopez MA    2:19 PM

## 2018-08-02 NOTE — LETTER
"8/2/2018      RE: Noe Florence  [address] [facility] [address]       Dear Colleague,    Thank you for the opportunity to participate in the care of your patient, Noe Florence, at the [facility]. Please see a copy of my visit note below.        Clinical Cardiac Electrophysiology      HPI: Noe Florence is a 82 year old male who presents for follow up of ICD and AF/AFL.  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 He is currently on warfarin for stroke prophylaxis and metoprolol 25 mg twice daily for rate control. Other cardiac medications: aspirin 81 mg daily and atorvastatin 40 mg daily. His device is nearing REBECCA.     Patient states that rarely misses doses of medication. Patient denies alcohol or tobacco use.  States that activity level is light, limited by mobility in his legs.  Atul snoring, apnea, or daytime sleepiness.  Sleeps with one pillows under head.  Denies chest pain or pressure, dizziness, syncope, dyspnea at rest or with exertion, palpitations, orthopnea, PND, abdominal edema, pedal edema, or claudication.  Denies easy bruising or bleeding, hematuria, hematochezia, and epistaxis. Denies signs/symptoms of stroke such as visual disturbance, difficulty speaking, facial drooping, confusion, problems with gait, or any new numbness or weakness.    Today s Device check: \"Patient seen in clinic for evaluation and iterative programming of his Medtronic dual lead ICD per MD orders.  Patient has an appointment to see Jaye Alexander NP today.  Normal ICD function.  3 NSVT episodes recorded - 1 sec, 200-222 bpm.  2 episodes recorded in the VT monitor zone that reveals ST-SVT - 8-23 sec, 143-154 bpm.  50 AT/AF episodes recorded - < 1 min - 10 min 51 " "sec in duration.  AF burden = <0.1%.  Patient is taking Coumadin.  Intrinsic rhythm = SB with first degree AVB @ 58 bpm.  AP = 71.7%.   = 27.9%.  OptiVol fluid index is at baseline.  Battery voltage = 2.63 V (RRT=2.63 V).  However, the battery has not tripped RRT to date.  No short v-v intervals recorded.  RV lead impedance trends appear stable.  Patient reports that he is feeling well and denies complaints.  Jaye Alexander NP notified of interrogation results.\"    Current Outpatient Prescriptions   Medication Sig Dispense Refill     aspirin 81 MG tablet Take 1 tablet by mouth daily.       atorvastatin (LIPITOR) 40 MG tablet Take 1 tablet (40 mg) by mouth daily (Patient not taking: Reported on 7/3/2018) 100 tablet 3     bacitracin ointment Apply topically 2 times daily APPLY TO LEFT LEG TWO TIMES PER DAY 28 g 0     Blood Pressure Monitor KIT 1 Units daily 1 kit 0     ciclopirox (LOPROX) 0.77 % cream Apply topically 2 times daily To feet and toenails. (Patient not taking: Reported on 7/3/2018) 90 g 6     cyanocobalamin (VITAMIN  B-12) 1000 MCG tablet Take 2 tablets (2,000 mcg) by mouth daily 180 tablet 3     doxazosin (CARDURA) 2 MG tablet Take 1/2 tab in the morning and 1/2 tab in the evening 90 tablet 3     fluticasone (FLONASE) 50 MCG/ACT nasal spray Spray 2 sprays into both nostrils daily (Patient not taking: Reported on 5/29/2018) 3 Package 0     ketoconazole (NIZORAL) 2 % cream Apply topically daily On hold (Patient not taking: Reported on 7/3/2018) 60 g 3     loratadine (CLARITIN) 10 MG tablet Take 10 mg by mouth as needed        metoprolol tartrate (LOPRESSOR) 25 MG tablet Take 1 tablet (25 mg) by mouth 2 times daily 180 tablet 3     mirtazapine (REMERON) 15 MG tablet Take 15 mg by mouth At Bedtime.       Multiple Vitamins-Minerals (CENTRUM SILVER) per tablet Take 1 tablet by mouth daily. AM        order for DME 20-30 mm Hg knee length compression stockings.  Measure and fit.  Style and color per patient " preference.  Pat Hsu per patient need. 1 Units 0     sertraline (ZOLOFT) 100 MG tablet Take 100 mg by mouth every evening.       triamcinolone (KENALOG) 0.1 % cream Apply topically 2 times daily For up to two weeks in a row (Patient not taking: Reported on 7/3/2018) 80 g 3     warfarin (COUMADIN) 5 MG tablet Take 1 tablet by mouth daily or as directed by the Coumadin clinic. (Patient not taking: Reported on 7/3/2018) 90 tablet 3     warfarin (COUMADIN) 5 MG tablet 7.5 mg on Wed; 5 mg all other days  or as instructed by coumadin clinic. (Patient taking differently: 5 mg all days  or as instructed by coumadin clinic.) 35 tablet 5       Past Medical History:   Diagnosis Date     Advanced open-angle glaucoma      Antiplatelet or antithrombotic long-term use      Atrial fibrillation (H)      CKD (chronic kidney disease), stage III 2005     Colon cancer (H)     Stage II-B colon cancer     Coronary artery disease     s/p CABG x 2, JEREMY x 2     Diverticulitis      Hyperlipidemia      Hypertension      Ischemic cardiomyopathy      MGUS (monoclonal gammopathy of unknown significance)      Nonsenile cataract     BE     Pacemaker      Polymorphic ventricular tachycardia (H)      Polymyalgia rheumatica (H)      PVD (posterior vitreous detachment), both eyes 2005     S/P ablation of atrial flutter 6/20/14    CTI     Stented coronary artery      SVT (supraventricular tachycardia) (H)     PPM/AICD for NSVT     Upper leg DVT (deep venous thromboembolism), chronic (H)     Left     Weight loss, unintentional 2017    15 lb in 4 months       Past Surgical History:   Procedure Laterality Date     C CABG, ARTERY-VEIN, FOUR  2006    LIMA-LAD, SVG-Rt PDA, SVG-OM2, SVG-Diag 1     C CABG, VEIN, SINGLE  1994    1-vessel CABG, SVG->PDRCA      CATARACT IOL, RT/LT Bilateral      COLONOSCOPY  3/13/2014    Procedure: COMBINED COLONOSCOPY, SINGLE BIOPSY/POLYPECTOMY BY BIOPSY;;  Surgeon: Mary Gerber MD;  Location: [facility]      "COLONOSCOPY N/A 6/22/2015    Procedure: COLONOSCOPY;  Surgeon: Marilin Newman MD;  Location: [facility]     H ABLATION ATRIAL FLUTTER       HEART CATH DRUG ELUTING STENT PLACEMENT  4/19/2012    JEREMY x 2 to LCx     IMPLANT IMPLANTABLE CARDIOVERTER DEFIBRILLATOR  5-    ppm/aicd     KNEE SURGERY      right and left knee surgeries     LAPAROSCOPIC ASSISTED COLECTOMY LEFT (DESCENDING)  4/8/2014    Procedure: LAPAROSCOPIC ASSISTED COLECTOMY LEFT (DESCENDING);  Hand assisted Laparoscopic Sigmoid Colectomy , Laparoscopic mobilization of spleenic flexure *Latex Allergy*Anesthesia General with Block;  Surgeon: Chanelle Guzmán MD;  [facility]     REPAIR VALVE MITRAL  2006     30-mm Medtronic Julian ring      SELECTIVE LASER TRABECULOPLASTY (SLT) OD (RIGHT EYE)  4/10, 1/12,+1/9/13    RE     SELECTIVE LASER TRABECULOPLASTY (SLT) OS (LEFT EYE)  5/2012     SHOULDER SURGERY      right rotator cuff       Family History   Problem Relation Age of Onset     C.A.D. Father      Anesthesia Reaction No family hx of      Crohn Disease No family hx of      Ulcerative Colitis No family hx of      Cancer - colorectal No family hx of      Macular Degeneration No family hx of      Cancer No family hx of      No known family hx of skin cancer     Melanoma No family hx of      Skin Cancer No family hx of        Social History   Substance Use Topics     Smoking status: Former Smoker     Types: Cigarettes, Cigars     Quit date: 1/1/1968     Smokeless tobacco: Never Used     Alcohol use 0.0 oz/week     0 Standard drinks or equivalent per week      Comment: 2-3 drinks twice weekly       Allergies   Allergen Reactions     Latex Rash     Rash         ROS:   Negative except for as indicated in HPI.    Physical Examination:  Vitals: /81 (BP Location: Left arm, Patient Position: Chair, Cuff Size: Adult Regular)  Pulse 65  Ht 1.727 m (5' 8\")  Wt 64.9 kg (143 lb 1.6 oz)  SpO2 100%  BMI 21.76 kg/m2  BMI= Body mass index is " 21.76 kg/(m^2).    GENERAL APPEARANCE: healthy, alert, and no acute distress  HEENT: no icterus, no xanthelasmas, normal pupil size and reaction, no cyanosis.  NECK: no asymmetry, no cervical bruits, no JVD   CHEST: lungs clear to auscultation - no rales, rhonchi or wheezes, no use of accessory muscles, no retractions, respirations are unlabored, normal respiratory rate  CARDIOVASCULAR: regular rhythm, normal S1 with physiologic split S2, no S3 or S4 and no murmur, click or rub  ABDOMEN:  no abdominal bruits, soft, non-tender  EXTREMITIES: no clubbing, cyanosis, or edema  NEURO: alert and oriented to person/place/time, normal speech, gait and affect  VASC: Radial and posterior tibialis pulses +2 and symmetric bilaterally.   SKIN: no ecchymoses    Laboratory:  Lab Results   Component Value Date    WBC 6.0 03/30/2018    RBC 3.34 (L) 03/30/2018    HGB 10.2 (L) 03/30/2018    HCT 32.2 (L) 03/30/2018    MCV 96 03/30/2018    MCH 30.5 03/30/2018    MCHC 31.7 03/30/2018    RDW 13.6 03/30/2018     03/30/2018     Lab Results   Component Value Date     03/30/2018    POTASSIUM 4.2 03/30/2018    CHLORIDE 108 03/30/2018    CO2 26 03/30/2018    ANIONGAP 6 03/30/2018    GLC 93 03/30/2018    BUN 44 (H) 03/30/2018    CR 1.15 03/30/2018    GFRESTIMATED 58 (L) 03/30/2018    GFRESTBLACK 70 03/30/2018    KEITH 9.1 03/30/2018      Lab Results   Component Value Date    INR 1.83 (H) 07/06/2018    INR 2.64 (H) 06/05/2018    INR 2.94 (H) 05/23/2018    INR 3.19 (H) 05/15/2018     No results found for: CKTOTAL, CKMB, TROPN  Cholesterol (mg/dL)   Date Value   01/15/2018 119   11/16/2016 131   09/16/2015 136   05/22/2014 138     Cholesterol/HDL Ratio (no units)   Date Value   09/16/2015 3.2   05/22/2014 3.0   05/09/2013 4.0   10/16/2012 3.1     HDL Cholesterol (mg/dL)   Date Value   01/15/2018 58   11/16/2016 52   09/16/2015 43   05/22/2014 45     LDL Cholesterol Calculated (mg/dL)   Date Value   01/15/2018 46   11/16/2016 56    2015 72   2014 68       ECHO:   No results found for this or any previous visit (from the past 8760 hour(s)).      Assessment and recommendations:    #1. Cardiac device in situ: polymorphic VT s/p Medtronic ICD placement (2012). Device is nearing REBECCA   1. Normal device function.   2. Keep scheduled follow ups with Device Clinic   3. Device is nearing REBECCA. Discussed the risk of defibrillator gen change which includes but is not limited to: reaction to narcotics or benzodiazepines used for moderate sedation, localized bleeding, internal bleeding, and acute or late infections. There is the possibility of unforeseen complications as well such as device or lead failure.  Patient would like to pursue and is cleared for ICD generator change.       #1 Atrial fibrillation: Discussed in detail with the patient management/treatment options for AF includin. Stroke Prophylaxis:  CHADSVASC=age++, CAD+, HTN+,   4, corresponding to a 4.0% annual stroke / systemic emolism event rate. indicating need for long term oral anticoagulation.  He has been taking warfarin without bleeding problems. His last INR was 1.83 and a dose adjustment has been make by the INR clinic. Continue warfarin.   2. Rate Control: Continue metoprolol 25 mg twice daily.   3. Rhythm Control: Utilizing a rate control strategy.   4. Risk factor modifications: Keep healthy cholesterol levels, maintain BP control, maintain a healthy weight, and limit caffeine and alcohol.     #2. CAD:   1. Aspirin: 81 mg daily   2. Statin: atorvastatin 40 mg daily   3. BB: metoprolol 25 mg twice daily   4. ACEi/ARB:   5. Lifestyle: Avoid tobacco, healthy weight, limit alcohol, low-sodium diet, 2-3 servings fruit and vegetables/day, limit saturated fats, regular exercise     FOLLOW UP: Follow up 3 months after his gen change or follow up sooner if needed for problems or symptoms.     Patient expresses understanding and agreement with the plan.    I appreciate the  chance to help with Noe garcia. Please let me know if you have any questions or concerns.    Jaye GLASS, COLTON-C

## 2018-08-02 NOTE — MR AVS SNAPSHOT
After Visit Summary   8/2/2018    Noe Florence    MRN: [id number]           Patient Information     Date Of Birth          1935        Visit Information        Provider Department      8/2/2018 1:30 PM 1,  Cv Device [facility]        Today's Diagnoses     Ischemic cardiomyopathy    -  1      Care Instructions    It was a pleasure to see you in clinic today.  Please do not hesitate to call with any questions or concerns.  We look forward to seeing you in clinic at your next device check in 3 months.    Kathy Bright, RN, MS, CCRN  Electrophysiology Nurse Clinician  [facility]    During Business Hours Please Call:  [phone]  After Hours Please Call:  [phone] - select option #4 and ask for job code [phone]                    Follow-ups after your visit        Your next 10 appointments already scheduled     Oct 01, 2018  9:45 AM CDT   Lab with  LAB    [facility] ([facility])    [address] [place] [address] [phone]            Oct 01, 2018 10:20 AM CDT   CT CHEST ABDOMEN PELVIS W/O & W CONTRAST with CT1   [facility] CT ([facility])    [address] [place] [address] [phone]           Please bring any scans or X-rays taken at other hospitals, if similar tests were done. Also bring a list of your medicines, including vitamins, minerals and over-the-counter drugs. It is safest to leave personal items at home.  Be sure to tell your doctor:   If you have any allergies.   If there s any chance you are pregnant.   If you are breastfeeding.  How to prepare:   Do not eat or drink for 2 hours before your exam. If you need to take medicine, you may take it with small sips of water. (We may ask you to take liquid medicine as well.)   Please wear loose clothing, such as a sweat suit or jogging clothes. Avoid snaps, zippers and other  metal. We may ask you to undress and put on a hospital gown.  Please arrive 30 minutes early for your CT. Once in the department you might be asked to drink water 15-20 minutes prior to your exam.  If indicated you may be asked to drink an oral contrast in advance of your CT.  If this is the case, the imaging team will let you know or be in contact with you prior to your appointment  Patients over 70 or patients with diabetes or kidney problems:   If you haven t had a blood test (creatinine test) within the last 30 days, the Cardiologist/Radiologist may require you to get this test prior to your exam.  If you have diabetes:   Continue to take your metformin medication on the day of your exam  If you have any questions, please call the Imaging Department where you will have your exam.            Oct 02, 2018 11:10 AM CDT   (Arrive by 10:55 AM)   Return Visit with MARIA LUISA Lackey CNP   [facility] ([facility])    [address] [place] [address] [address]  [address]  [facility] [id number] [phone]            Nov 20, 2018 11:00 AM CST   US CAROTID BILATERAL with UCUSV2   [facility] ([facility])    [address]  1st [address]  [facility] [id number] [phone]           Please bring a list of your medicines (including vitamins, minerals and over-the-counter drugs). Also, tell your doctor about any allergies you may have. Wear comfortable clothes and leave your valuables at home.  You do not need to do anything special to prepare for your exam.  Please call the Imaging Department at your exam site with any questions.            Nov 27, 2018  1:00 PM CST   (Arrive by 12:45 PM)   Return Vascular Visit with Frida Desai MD   [facility] ([facility])    [address] [place] [address] Se  [address]  [facility] [id number] [phone]            Dec 03, 2018 10:30 AM CST   (Arrive by 10:15 AM)   Implanted  Defibulator with Uc Cv Device 1   [facility] ([facility])    [address]  [address]  [facility] [address] [phone]            Dec 03, 2018  1:30 PM CST   (Arrive by 1:15 PM)   RETURN ARRHYTHMIA with Deedee Baird MD   [facility] ([facility])    [address]  [address]  [facility] [address] [phone]            Jan 07, 2019 10:00 AM CST   (Arrive by 9:45 AM)   Return Visit with Roberto Sarmiento MD   [facility] ([facility])    [address]  4th [address]  [facility] [id number] [phone]            Jan 08, 2019 12:30 PM CST   Return Neuropathy Visit with Jase Duarte MD   [facility] ([facility])    [address]  [address]  [facility] [id number] [phone]              Who to contact     If you have questions or need follow up information about today's clinic visit or your schedule please contact [facility] directly at [phone].  Normal or non-critical lab and imaging results will be communicated to you by MyChart, letter or phone within 4 business days after the clinic has received the results. If you do not hear from us within 7 days, please contact the clinic through [employer] or phone. If you have a critical or abnormal lab result, we will notify you by phone as soon as possible.  Submit refill requests through [facility] or call your pharmacy and they will forward the refill request to us. Please allow 3 business days for your refill to be completed.          Additional Information About Your Visit        [employer] Information     [facility] gives you secure access to your electronic health record. If you see a primary care provider, you can also send messages to your care team and make appointments. If you have questions, please call your primary care clinic.  If you do not have a primary care  provider, please call [phone] and they will assist you.        Care EveryWhere ID     This is your Care EveryWhere ID. This could be used by other organizations to access your [facility] medical records  [id number]         Blood Pressure from Last 3 Encounters:   08/06/18 131/77   08/02/18 129/81   07/03/18 124/68    Weight from Last 3 Encounters:   08/06/18 66.9 kg (147 lb 6.4 oz)   08/02/18 64.9 kg (143 lb 1.6 oz)   07/03/18 65.3 kg (144 lb)              We Performed the Following     ICD DEVICE PROGRAMMING EVAL, DUAL LEAD ICD          Today's Medication Changes          These changes are accurate as of 8/2/18 11:59 PM.  If you have any questions, ask your nurse or doctor.               These medicines have changed or have updated prescriptions.        Dose/Directions    * warfarin 5 MG tablet   Commonly known as:  COUMADIN   This may have changed:  additional instructions   Used for:  Atrial flutter (H)        7.5 mg on Wed; 5 mg all other days  or as instructed by coumadin clinic.   Quantity:  35 tablet   Refills:  5       * warfarin 5 MG tablet   Commonly known as:  COUMADIN   This may have changed:  Another medication with the same name was changed. Make sure you understand how and when to take each.   Used for:  Paroxysmal atrial fibrillation (H), Long-term (current) use of anticoagulants        Take 1 tablet by mouth daily or as directed by the Coumadin clinic.   Quantity:  90 tablet   Refills:  3       * Notice:  This list has 2 medication(s) that are the same as other medications prescribed for you. Read the directions carefully, and ask your doctor or other care provider to review them with you.             Primary Care Provider Office Phone # Fax #    Roberto Sarmiento [phone]       [address] [address]        Equal Access to Services     LEE CASTILLO AH: jayashree Mace qaybta kaalmada adeegyada, waxay idiin hayaan  traceeidalia jerisoren la'aan ah. So [facility] [phone].    ATENCIÓN: Si perico carl, tiene a aguirre disposición servicios gratuitos de asistencia lingüística. Lauren galicia [phone].    We comply with applicable federal civil rights laws and [place] laws. We do not discriminate on the basis of race, color, national origin, age, disability, sex, sexual orientation, or gender identity.            Thank you!     Thank you for choosing [facility]  for your care. Our goal is always to provide you with excellent care. Hearing back from our patients is one way we can continue to improve our services. Please take a few minutes to complete the written survey that you may receive in the mail after your visit with us. Thank you!             Your Updated Medication List - Protect others around you: Learn how to safely use, store and throw away your medicines at www.disposemymeds.org.          This list is accurate as of 8/2/18 11:59 PM.  Always use your most recent med list.                   Brand Name Dispense Instructions for use Diagnosis    aspirin 81 MG tablet      Take 1 tablet by mouth daily.        atorvastatin 40 MG tablet    LIPITOR    100 tablet    Take 1 tablet (40 mg) by mouth daily    Hyperlipidemia, unspecified hyperlipidemia type       bacitracin ointment     28 g    Apply topically 2 times daily APPLY TO LEFT LEG TWO TIMES PER DAY    Left leg cellulitis       Blood Pressure Monitor Kit     1 kit    1 Units daily    Hypertension       CENTRUM SILVER per tablet      Take 1 tablet by mouth daily. AM        ciclopirox 0.77 % cream    LOPROX    90 g    Apply topically 2 times daily To feet and toenails.    Dermatophytosis of nail, Tinea pedis of both feet       cyanocobalamin 1000 MCG tablet    vitamin  B-12    180 tablet    Take 2 tablets (2,000 mcg) by mouth daily    Anemia       doxazosin 2 MG tablet    CARDURA    90 tablet    Take 1/2 tab in the morning and 1/2 tab in the evening    Essential hypertension        fluticasone 50 MCG/ACT spray    FLONASE    3 Package    Spray 2 sprays into both nostrils daily    Unspecified sinusitis (chronic)       ketoconazole 2 % cream    NIZORAL    60 g    Apply topically daily On hold    Tinea corporis       loratadine 10 MG tablet    CLARITIN     Take 10 mg by mouth as needed        metoprolol tartrate 25 MG tablet    LOPRESSOR    180 tablet    Take 1 tablet (25 mg) by mouth 2 times daily    Coronary artery disease involving native heart without angina pectoris, unspecified vessel or lesion type       mirtazapine 15 MG tablet    REMERON     Take 15 mg by mouth At Bedtime.        order for DME     1 Units    20-30 mm Hg knee length compression stockings.  Measure and fit.  Style and color per patient preference.  Doff n Aracelis per patient need.    PVD (peripheral vascular disease) (H), Ulcer of left lower extremity, limited to breakdown of skin (H)       triamcinolone 0.1 % cream    KENALOG    80 g    Apply topically 2 times daily For up to two weeks in a row    Atopic eczema       VITAMIN D-3 PO      Take 1 tablet by mouth 2 times daily        * warfarin 5 MG tablet    COUMADIN    35 tablet    7.5 mg on Wed; 5 mg all other days  or as instructed by coumadin clinic.    Atrial flutter (H)       * warfarin 5 MG tablet    COUMADIN    90 tablet    Take 1 tablet by mouth daily or as directed by the Coumadin clinic.    Paroxysmal atrial fibrillation (H), Long-term (current) use of anticoagulants       ZOLOFT 100 MG tablet   Generic drug:  sertraline      Take 100 mg by mouth every evening.        * Notice:  This list has 2 medication(s) that are the same as other medications prescribed for you. Read the directions carefully, and ask your doctor or other care provider to review them with you.

## 2018-08-02 NOTE — PROGRESS NOTES
"    Clinical Cardiac Electrophysiology      HPI: Noe Florence is a 82 year old male who presents for follow up of ICD and AF/AFL.  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 He is currently on warfarin for stroke prophylaxis and metoprolol 25 mg twice daily for rate control. Other cardiac medications: aspirin 81 mg daily and atorvastatin 40 mg daily. His device is nearing REBECCA.     Patient states that rarely misses doses of medication. Patient denies alcohol or tobacco use.  States that activity level is light, limited by mobility in his legs.  Atul snoring, apnea, or daytime sleepiness.  Sleeps with one pillows under head.  Denies chest pain or pressure, dizziness, syncope, dyspnea at rest or with exertion, palpitations, orthopnea, PND, abdominal edema, pedal edema, or claudication.  Denies easy bruising or bleeding, hematuria, hematochezia, and epistaxis. Denies signs/symptoms of stroke such as visual disturbance, difficulty speaking, facial drooping, confusion, problems with gait, or any new numbness or weakness.    Today s Device check: \"Patient seen in clinic for evaluation and iterative programming of his Medtronic dual lead ICD per MD orders.  Patient has an appointment to see Jaye Alexander NP today.  Normal ICD function.  3 NSVT episodes recorded - 1 sec, 200-222 bpm.  2 episodes recorded in the VT monitor zone that reveals ST-SVT - 8-23 sec, 143-154 bpm.  50 AT/AF episodes recorded - < 1 min - 10 min 51 sec in duration.  AF burden = <0.1%.  Patient is taking Coumadin.  Intrinsic rhythm = SB with first degree AVB @ 58 bpm.  AP = 71.7%.   = 27.9%.  OptiVol fluid index is at baseline.  Battery voltage = 2.63 V (RRT=2.63 V).  However, the battery has not tripped RRT to date.  No short v-v intervals recorded.  RV lead " "impedance trends appear stable.  Patient reports that he is feeling well and denies complaints.  Jaye Alexander NP notified of interrogation results.\"    Current Outpatient Prescriptions   Medication Sig Dispense Refill     aspirin 81 MG tablet Take 1 tablet by mouth daily.       atorvastatin (LIPITOR) 40 MG tablet Take 1 tablet (40 mg) by mouth daily (Patient not taking: Reported on 7/3/2018) 100 tablet 3     bacitracin ointment Apply topically 2 times daily APPLY TO LEFT LEG TWO TIMES PER DAY 28 g 0     Blood Pressure Monitor KIT 1 Units daily 1 kit 0     ciclopirox (LOPROX) 0.77 % cream Apply topically 2 times daily To feet and toenails. (Patient not taking: Reported on 7/3/2018) 90 g 6     cyanocobalamin (VITAMIN  B-12) 1000 MCG tablet Take 2 tablets (2,000 mcg) by mouth daily 180 tablet 3     doxazosin (CARDURA) 2 MG tablet Take 1/2 tab in the morning and 1/2 tab in the evening 90 tablet 3     fluticasone (FLONASE) 50 MCG/ACT nasal spray Spray 2 sprays into both nostrils daily (Patient not taking: Reported on 5/29/2018) 3 Package 0     ketoconazole (NIZORAL) 2 % cream Apply topically daily On hold (Patient not taking: Reported on 7/3/2018) 60 g 3     loratadine (CLARITIN) 10 MG tablet Take 10 mg by mouth as needed        metoprolol tartrate (LOPRESSOR) 25 MG tablet Take 1 tablet (25 mg) by mouth 2 times daily 180 tablet 3     mirtazapine (REMERON) 15 MG tablet Take 15 mg by mouth At Bedtime.       Multiple Vitamins-Minerals (CENTRUM SILVER) per tablet Take 1 tablet by mouth daily. AM        order for DME 20-30 mm Hg knee length compression stockings.  Measure and fit.  Style and color per patient preference.  Pat molina Aracelis per patient need. 1 Units 0     sertraline (ZOLOFT) 100 MG tablet Take 100 mg by mouth every evening.       triamcinolone (KENALOG) 0.1 % cream Apply topically 2 times daily For up to two weeks in a row (Patient not taking: Reported on 7/3/2018) 80 g 3     warfarin (COUMADIN) 5 MG tablet " Take 1 tablet by mouth daily or as directed by the Coumadin clinic. (Patient not taking: Reported on 7/3/2018) 90 tablet 3     warfarin (COUMADIN) 5 MG tablet 7.5 mg on Wed; 5 mg all other days  or as instructed by coumadin clinic. (Patient taking differently: 5 mg all days  or as instructed by coumadin clinic.) 35 tablet 5       Past Medical History:   Diagnosis Date     Advanced open-angle glaucoma      Antiplatelet or antithrombotic long-term use      Atrial fibrillation (H)      CKD (chronic kidney disease), stage III 2005     Colon cancer (H)     Stage II-B colon cancer     Coronary artery disease     s/p CABG x 2, JEREMY x 2     Diverticulitis      Hyperlipidemia      Hypertension      Ischemic cardiomyopathy      MGUS (monoclonal gammopathy of unknown significance)      Nonsenile cataract     BE     Pacemaker      Polymorphic ventricular tachycardia (H)      Polymyalgia rheumatica (H)      PVD (posterior vitreous detachment), both eyes 2005     S/P ablation of atrial flutter 6/20/14    CTI     Stented coronary artery      SVT (supraventricular tachycardia) (H)     PPM/AICD for NSVT     Upper leg DVT (deep venous thromboembolism), chronic (H)     Left     Weight loss, unintentional 2017    15 lb in 4 months       Past Surgical History:   Procedure Laterality Date     C CABG, ARTERY-VEIN, FOUR  2006    LIMA-LAD, SVG-Rt PDA, SVG-OM2, SVG-Diag 1     C CABG, VEIN, SINGLE  1994    1-vessel CABG, SVG->PDRCA      CATARACT IOL, RT/LT Bilateral      COLONOSCOPY  3/13/2014    Procedure: COMBINED COLONOSCOPY, SINGLE BIOPSY/POLYPECTOMY BY BIOPSY;;  Surgeon: Mary Gerber MD;  Location:  GI     COLONOSCOPY N/A 6/22/2015    Procedure: COLONOSCOPY;  Surgeon: Marilin Newman MD;  Location: [facility] GI     H ABLATION ATRIAL FLUTTER       HEART CATH DRUG ELUTING STENT PLACEMENT  4/19/2012    JEREMY x 2 to LCx     IMPLANT IMPLANTABLE CARDIOVERTER DEFIBRILLATOR  5-    ppm/aicd     KNEE SURGERY      right and left  "knee surgeries     LAPAROSCOPIC ASSISTED COLECTOMY LEFT (DESCENDING)  4/8/2014    Procedure: LAPAROSCOPIC ASSISTED COLECTOMY LEFT (DESCENDING);  Hand assisted Laparoscopic Sigmoid Colectomy , Laparoscopic mobilization of spleenic flexure *Latex Allergy*Anesthesia General with Block;  Surgeon: Chanelle Guzmán MD;  Location: [facility] OR     REPAIR VALVE MITRAL  2006     30-mm Medtronic Julian ring      SELECTIVE LASER TRABECULOPLASTY (SLT) OD (RIGHT EYE)  4/10, 1/12,+1/9/13    RE     SELECTIVE LASER TRABECULOPLASTY (SLT) OS (LEFT EYE)  5/2012     SHOULDER SURGERY      right rotator cuff       Family History   Problem Relation Age of Onset     C.A.D. Father      Anesthesia Reaction No family hx of      Crohn Disease No family hx of      Ulcerative Colitis No family hx of      Cancer - colorectal No family hx of      Macular Degeneration No family hx of      Cancer No family hx of      No known family hx of skin cancer     Melanoma No family hx of      Skin Cancer No family hx of        Social History   Substance Use Topics     Smoking status: Former Smoker     Types: Cigarettes, Cigars     Quit date: 1/1/1968     Smokeless tobacco: Never Used     Alcohol use 0.0 oz/week     0 Standard drinks or equivalent per week      Comment: 2-3 drinks twice weekly       Allergies   Allergen Reactions     Latex Rash     Rash         ROS:   Negative except for as indicated in HPI.    Physical Examination:  Vitals: /81 (BP Location: Left arm, Patient Position: Chair, Cuff Size: Adult Regular)  Pulse 65  Ht 1.727 m (5' 8\")  Wt 64.9 kg (143 lb 1.6 oz)  SpO2 100%  BMI 21.76 kg/m2  BMI= Body mass index is 21.76 kg/(m^2).    GENERAL APPEARANCE: healthy, alert, and no acute distress  HEENT: no icterus, no xanthelasmas, normal pupil size and reaction, no cyanosis.  NECK: no asymmetry, no cervical bruits, no JVD   CHEST: lungs clear to auscultation - no rales, rhonchi or wheezes, no use of accessory muscles, no retractions, " respirations are unlabored, normal respiratory rate  CARDIOVASCULAR: regular rhythm, normal S1 with physiologic split S2, no S3 or S4 and no murmur, click or rub  ABDOMEN:  no abdominal bruits, soft, non-tender  EXTREMITIES: no clubbing, cyanosis, or edema  NEURO: alert and oriented to person/place/time, normal speech, gait and affect  VASC: Radial and posterior tibialis pulses +2 and symmetric bilaterally.   SKIN: no ecchymoses    Laboratory:  Lab Results   Component Value Date    WBC 6.0 03/30/2018    RBC 3.34 (L) 03/30/2018    HGB 10.2 (L) 03/30/2018    HCT 32.2 (L) 03/30/2018    MCV 96 03/30/2018    MCH 30.5 03/30/2018    MCHC 31.7 03/30/2018    RDW 13.6 03/30/2018     03/30/2018     Lab Results   Component Value Date     03/30/2018    POTASSIUM 4.2 03/30/2018    CHLORIDE 108 03/30/2018    CO2 26 03/30/2018    ANIONGAP 6 03/30/2018    GLC 93 03/30/2018    BUN 44 (H) 03/30/2018    CR 1.15 03/30/2018    GFRESTIMATED 58 (L) 03/30/2018    GFRESTBLACK 70 03/30/2018    KEITH 9.1 03/30/2018      Lab Results   Component Value Date    INR 1.83 (H) 07/06/2018    INR 2.64 (H) 06/05/2018    INR 2.94 (H) 05/23/2018    INR 3.19 (H) 05/15/2018     No results found for: CKTOTAL, CKMB, TROPN  Cholesterol (mg/dL)   Date Value   01/15/2018 119   11/16/2016 131   09/16/2015 136   05/22/2014 138     Cholesterol/HDL Ratio (no units)   Date Value   09/16/2015 3.2   05/22/2014 3.0   05/09/2013 4.0   10/16/2012 3.1     HDL Cholesterol (mg/dL)   Date Value   01/15/2018 58   11/16/2016 52   09/16/2015 43   05/22/2014 45     LDL Cholesterol Calculated (mg/dL)   Date Value   01/15/2018 46   11/16/2016 56   09/16/2015 72   05/22/2014 68       ECHO:   No results found for this or any previous visit (from the past 8760 hour(s)).      Assessment and recommendations:    #1. Cardiac device in situ: polymorphic VT s/p Medtronic ICD placement (5/2012). Device is nearing REBECCA   1. Normal device function.   2. Keep scheduled follow ups with  Device Clinic   3. Device is nearing REBECCA. Discussed the risk of defibrillator gen change which includes but is not limited to: reaction to narcotics or benzodiazepines used for moderate sedation, localized bleeding, internal bleeding, and acute or late infections. There is the possibility of unforeseen complications as well such as device or lead failure.  Patient would like to pursue and is cleared for ICD generator change.       #1 Atrial fibrillation: Discussed in detail with the patient management/treatment options for AF includin. Stroke Prophylaxis:  CHADSVASC=age++, CAD+, HTN+,   4, corresponding to a 4.0% annual stroke / systemic [facility] event rate. indicating need for long term oral anticoagulation.  He has been taking warfarin without bleeding problems. His last INR was 1.83 and a dose adjustment has been make by the INR clinic. Continue warfarin.   2. Rate Control: Continue metoprolol 25 mg twice daily.   3. Rhythm Control: Utilizing a rate control strategy.   4. Risk factor modifications: Keep healthy cholesterol levels, maintain BP control, maintain a healthy weight, and limit caffeine and alcohol.     #2. CAD:   1. Aspirin: 81 mg daily   2. Statin: atorvastatin 40 mg daily   3. BB: metoprolol 25 mg twice daily   4. ACEi/ARB:   5. Lifestyle: Avoid tobacco, healthy weight, limit alcohol, low-sodium diet, 2-3 servings fruit and vegetables/day, limit saturated fats, regular exercise     FOLLOW UP: Follow up 3 months after his gen change or follow up sooner if needed for problems or symptoms.     Patient expresses understanding and agreement with the plan.    I appreciate the chance to help with Noe garcia. Please let me know if you have any questions or concerns.    Jaye GLASS, NP-C

## 2018-08-02 NOTE — PATIENT INSTRUCTIONS
It was a pleasure to see you in clinic today.  Please do not hesitate to call with any questions or concerns.  We look forward to seeing you in clinic at your next device check in 3 months.    Kathy Bright, RN, MS, CCRN  Electrophysiology Nurse Clinician  [facility]    During Business Hours Please Call:  [phone]  After Hours Please Call:  [phone] - select option #4 and ask for job code [id number]

## 2018-08-02 NOTE — PROGRESS NOTES
Preliminary Device Interrogation Results.  Final physician signed paceart report to be scanned and attached.    Patient seen in clinic for evaluation and iterative programming of his Medtronic dual lead ICD per MD orders.  Patient has an appointment to see Jaye Alexander NP today.  Normal ICD function.  3 NSVT episodes recorded - 1 sec, 200-222 bpm.  2 episodes recorded in the VT monitor zone that reveals ST-SVT - 8-23 sec, 143-154 bpm.  50 AT/AF episodes recorded - < 1 min - 10 min 51 sec in duration.  AF burden = <0.1%.  Patient is taking Coumadin.  Intrinsic rhythm = SB with first degree AVB @ 58 bpm.  AP = 71.7%.   = 27.9%.  OptiVol fluid index is at baseline.  Battery voltage = 2.63 V (RRT=2.63 V).  However, the battery has not tripped RRT to date.  No short v-v intervals recorded.  RV lead impedance trends appear stable.  Patient reports that he is feeling well and denies complaints.  Jaye Alexander NP notified of interrogation results.    Dual lead ICD

## 2018-08-06 ENCOUNTER — OFFICE VISIT (OUTPATIENT)
Dept: INTERNAL MEDICINE | Facility: CLINIC | Age: 83
End: 2018-08-06
Payer: COMMERCIAL

## 2018-08-06 ENCOUNTER — ANTICOAGULATION THERAPY VISIT (OUTPATIENT)
Dept: ANTICOAGULATION | Facility: CLINIC | Age: 83
End: 2018-08-06

## 2018-08-06 VITALS
BODY MASS INDEX: 22.41 KG/M2 | WEIGHT: 147.4 LBS | HEART RATE: 78 BPM | DIASTOLIC BLOOD PRESSURE: 77 MMHG | SYSTOLIC BLOOD PRESSURE: 131 MMHG | RESPIRATION RATE: 16 BRPM

## 2018-08-06 DIAGNOSIS — Z00.00 ROUTINE HEALTH MAINTENANCE: Primary | ICD-10-CM

## 2018-08-06 DIAGNOSIS — I48.0 PAROXYSMAL ATRIAL FIBRILLATION (H): ICD-10-CM

## 2018-08-06 DIAGNOSIS — Z79.01 LONG-TERM (CURRENT) USE OF ANTICOAGULANTS: ICD-10-CM

## 2018-08-06 LAB — INR PPP: 2.45 (ref 0.86–1.14)

## 2018-08-06 ASSESSMENT — PAIN SCALES - GENERAL: PAINLEVEL: NO PAIN (0)

## 2018-08-06 NOTE — MR AVS SNAPSHOT
Noe Florence   8/6/2018   Anticoagulation Therapy Visit    Description:  83 year old male   Provider:  Leena Abebe RN   Department:   Antico Clinic           INR as of 8/6/2018     Today's INR 2.45      Anticoagulation Summary as of 8/6/2018     INR goal 2.0-3.0   Today's INR 2.45   Full warfarin instructions 5 mg every day   Next INR check 9/17/2018    Indications   Atrial fibrillation (H) [I48.91] [I48.91]  Long-term (current) use of anticoagulants [Z79.01] [Z79.01]         August 2018 Details    Sun Mon Tue Wed Thu Fri Sat        1               2               3               4                 5               6      5 mg   See details      7      5 mg         8      5 mg         9      5 mg         10      5 mg         11      5 mg           12      5 mg         13      5 mg         14      5 mg         15      5 mg         16      5 mg         17      5 mg         18      5 mg           19      5 mg         20      5 mg         21      5 mg         22      5 mg         23      5 mg         24      5 mg         25      5 mg           26      5 mg         27      5 mg         28      5 mg         29      5 mg         30      5 mg         31      5 mg           Date Details   08/06 This INR check               How to take your warfarin dose     To take:  5 mg Take 1 of the 5 mg tablets.           September 2018 Details    Sun Mon Tue Wed Thu Fri Sat           1      5 mg           2      5 mg         3      5 mg         4      5 mg         5      5 mg         6      5 mg         7      5 mg         8      5 mg           9      5 mg         10      5 mg         11      5 mg         12      5 mg         13      5 mg         14      5 mg         15      5 mg           16      5 mg         17            18               19               20               21               22                 23               24               25               26               27               28               29                  30                      Date Details   No additional details    Date of next INR:  9/17/2018         How to take your warfarin dose     To take:  5 mg Take 1 of the 5 mg tablets.

## 2018-08-06 NOTE — MR AVS SNAPSHOT
After Visit Summary   8/6/2018    Noe Florence    MRN: [id number]           Patient Information     Date Of Birth          1935        Visit Information        Provider Department      8/6/2018 10:00 AM Roberto Sarmiento MD [facility]        Today's Diagnoses     Routine health maintenance    -  1       Follow-ups after your visit        Your next 10 appointments already scheduled     Oct 01, 2018  9:45 AM CDT   Lab with  LAB   [facility] ([facility] [place])    [address] [place] [address] [phone]            Oct 01, 2018 10:20 AM CDT   CT CHEST ABDOMEN PELVIS W/O & W CONTRAST with UCCT1   [facility] CT ([facility])    [address] [place] [address] [phone]           Please bring any scans or X-rays taken at other hospitals, if similar tests were done. Also bring a list of your medicines, including vitamins, minerals and over-the-counter drugs. It is safest to leave personal items at home.  Be sure to tell your doctor:   If you have any allergies.   If there s any chance you are pregnant.   If you are breastfeeding.  How to prepare:   Do not eat or drink for 2 hours before your exam. If you need to take medicine, you may take it with small sips of water. (We may ask you to take liquid medicine as well.)   Please wear loose clothing, such as a sweat suit or jogging clothes. Avoid snaps, zippers and other metal. We may ask you to undress and put on a hospital gown.  Please arrive 30 minutes early for your CT. Once in the department you might be asked to drink water 15-20 minutes prior to your exam.  If indicated you may be asked to drink an oral contrast in advance of your CT.  If this is the case, the imaging team will let you know or be in contact with you prior to your appointment  Patients over 70 or patients with diabetes or kidney  problems:   If you haven t had a blood test (creatinine test) within the last 30 days, the Cardiologist/Radiologist may require you to get this test prior to your exam.  If you have diabetes:   Continue to take your metformin medication on the day of your exam  If you have any questions, please call the Imaging Department where you will have your exam.            Oct 02, 2018 11:10 AM CDT   (Arrive by 10:55 AM)   Return Visit with MARIA LUISA Lackey CNP   [facility] ([facility])    [address] [place] [address]  [address]  [facility] [id number] [phone]            Nov 20, 2018 11:00 AM CST   US CAROTID BILATERAL with UCUSV2   [facility] US ([facility])    [address] [place] [address]  1st Floor  [facility] [id number] [phone]           Please bring a list of your medicines (including vitamins, minerals and over-the-counter drugs). Also, tell your doctor about any allergies you may have. Wear comfortable clothes and leave your valuables at home.  You do not need to do anything special to prepare for your exam.  Please call the Imaging Department at your exam site with any questions.            Nov 27, 2018  1:00 PM CST   (Arrive by 12:45 PM)   Return Vascular Visit with Frida Desai MD   [facility] ([facility])    [address] [place] [address]  [address] [place] [id number] [phone]            Dec 03, 2018 10:30 AM CST   (Arrive by 10:15 AM)   Implanted Defibulator with Uc Cv Device 1   [facility])    [address] [place] [address]  [address] [place] [id number] [phone]            Dec 03, 2018  1:30 PM CST   (Arrive by 1:15 PM)   RETURN ARRHYTHMIA with Deedee Baird MD   [facility] ([facility])    [address] [place] [address]  [address] [place] [id number] [phone]            Jan 08, 2019 12:30 PM CST    Return Neuropathy Visit with Jase Duarte MD   [facility] ([facility])    [address]  [address]  [facility] [address] [phone]              Who to contact     Please call your clinic at [phone] to:    Ask questions about your health    Make or cancel appointments    Discuss your medicines    Learn about your test results    Speak to your doctor            Additional Information About Your Visit        [employer] Information     [employer] gives you secure access to your electronic health record. If you see a primary care provider, you can also send messages to your care team and make appointments. If you have questions, please call your primary care clinic.  If you do not have a primary care provider, please call [phone] and they will assist you.      [employer] is an electronic gateway that provides easy, online access to your medical records. With [employer], you can request a clinic appointment, read your test results, renew a prescription or communicate with your care team.     To access your existing account, please contact your [facility] or call [phone] for assistance.        Care EveryWhere ID     This is your Care EveryWhere ID. This could be used by other organizations to access your [place] medical records  [id number]        Your Vitals Were     Pulse Respirations BMI (Body Mass Index)             78 16 22.41 kg/m2          Blood Pressure from Last 3 Encounters:   08/06/18 131/77   08/02/18 129/81   07/03/18 124/68    Weight from Last 3 Encounters:   08/06/18 66.9 kg (147 lb 6.4 oz)   08/02/18 64.9 kg (143 lb 1.6 oz)   07/03/18 65.3 kg (144 lb)              We Performed the Following     ADMIN MEDICARE: Pneumococcal Vaccine ()     Pneumococcal vaccine 23 valent PPSV23  (Pneumovax) [48201]     ZOSTER VACCINE RECOMBINANT ADJUVANTED IM NJX          Today's Medication Changes          These changes are accurate as of  8/6/18 11:04 AM.  If you have any questions, ask your nurse or doctor.               These medicines have changed or have updated prescriptions.        Dose/Directions    * warfarin 5 MG tablet   Commonly known as:  COUMADIN   This may have changed:  additional instructions   Used for:  Atrial flutter (H)        7.5 mg on Wed; 5 mg all other days  or as instructed by coumadin clinic.   Quantity:  35 tablet   Refills:  5       * warfarin 5 MG tablet   Commonly known as:  COUMADIN   This may have changed:  Another medication with the same name was changed. Make sure you understand how and when to take each.   Used for:  Paroxysmal atrial fibrillation (H), Long-term (current) use of anticoagulants        Take 1 tablet by mouth daily or as directed by the Coumadin clinic.   Quantity:  90 tablet   Refills:  3       * Notice:  This list has 2 medication(s) that are the same as other medications prescribed for you. Read the directions carefully, and ask your doctor or other care provider to review them with you.             Primary Care Provider Office Phone # Fax #    Roberto Sarmiento [phone]       [address] [place] [address]        Equal Access to Services     [facility]: Hadamanda Bains, wakerryda faustino, qaybta renetta holcomb, sil minor. So [facility] [phone].    ATENCIÓN: Si habla español, tiene a aguirre disposición servicios gratuitos de asistencia lingüística. [facility] [phone].    We comply with applicable federal civil rights laws and [place] laws. We do not discriminate on the basis of race, color, national origin, age, disability, sex, sexual orientation, or gender identity.            Thank you!     Thank you for choosing [facility]  for your care. Our goal is always to provide you with excellent care. Hearing back from our patients is one way we can continue to improve our services. Please take  a few minutes to complete the written survey that you may receive in the mail after your visit with us. Thank you!             Your Updated Medication List - Protect others around you: Learn how to safely use, store and throw away your medicines at www.disposemymeds.org.          This list is accurate as of 8/6/18 11:04 AM.  Always use your most recent med list.                   Brand Name Dispense Instructions for use Diagnosis    aspirin 81 MG tablet      Take 1 tablet by mouth daily.        atorvastatin 40 MG tablet    LIPITOR    100 tablet    Take 1 tablet (40 mg) by mouth daily    Hyperlipidemia, unspecified hyperlipidemia type       bacitracin ointment     28 g    Apply topically 2 times daily APPLY TO LEFT LEG TWO TIMES PER DAY    Left leg cellulitis       Blood Pressure Monitor Kit     1 kit    1 Units daily    Hypertension       CENTRUM SILVER per tablet      Take 1 tablet by mouth daily. AM        ciclopirox 0.77 % cream    LOPROX    90 g    Apply topically 2 times daily To feet and toenails.    Dermatophytosis of nail, Tinea pedis of both feet       cyanocobalamin 1000 MCG tablet    vitamin  B-12    180 tablet    Take 2 tablets (2,000 mcg) by mouth daily    Anemia       doxazosin 2 MG tablet    CARDURA    90 tablet    Take 1/2 tab in the morning and 1/2 tab in the evening    Essential hypertension       fluticasone 50 MCG/ACT spray    FLONASE    3 Package    Spray 2 sprays into both nostrils daily    Unspecified sinusitis (chronic)       ketoconazole 2 % cream    NIZORAL    60 g    Apply topically daily On hold    Tinea corporis       loratadine 10 MG tablet    CLARITIN     Take 10 mg by mouth as needed        metoprolol tartrate 25 MG tablet    LOPRESSOR    180 tablet    Take 1 tablet (25 mg) by mouth 2 times daily    Coronary artery disease involving native heart without angina pectoris, unspecified vessel or lesion type       mirtazapine 15 MG tablet    REMERON     Take 15 mg by mouth At Bedtime.         order for DME     1 Units    20-30 mm Hg knee length compression stockings.  Measure and fit.  Style and color per patient preference.  Doff n Aracelis per patient need.    PVD (peripheral vascular disease) (H), Ulcer of left lower extremity, limited to breakdown of skin (H)       triamcinolone 0.1 % cream    KENALOG    80 g    Apply topically 2 times daily For up to two weeks in a row    Atopic eczema       VITAMIN D-3 PO      Take 1 tablet by mouth 2 times daily        * warfarin 5 MG tablet    COUMADIN    35 tablet    7.5 mg on Wed; 5 mg all other days  or as instructed by coumadin clinic.    Atrial flutter (H)       * warfarin 5 MG tablet    COUMADIN    90 tablet    Take 1 tablet by mouth daily or as directed by the Coumadin clinic.    Paroxysmal atrial fibrillation (H), Long-term (current) use of anticoagulants       ZOLOFT 100 MG tablet   Generic drug:  sertraline      Take 100 mg by mouth every evening.        * Notice:  This list has 2 medication(s) that are the same as other medications prescribed for you. Read the directions carefully, and ask your doctor or other care provider to review them with you.

## 2018-08-06 NOTE — NURSING NOTE
Administered Pneumococcal Pneumovax 23 and Shingle Shingrix vaccine (see Immunizations in Chart Review). Patient tolerated well.  Niranjan Galaviz CMA at 11:14 AM on 8/6/2018

## 2018-08-06 NOTE — PROGRESS NOTES
ANTICOAGULATION FOLLOW-UP CLINIC VISIT    Patient Name:  Noe Florence  Date:  8/6/2018  Contact Type:  Telephone    SUBJECTIVE:     Patient Findings     Positives Other complaints (left eye that was bloody last month has cleared, and is back to normal.)           OBJECTIVE    INR   Date Value Ref Range Status   08/06/2018 2.45 (H) 0.86 - 1.14 Final       ASSESSMENT / PLAN  INR assessment THER    Recheck INR In: 6 WEEKS    INR Location Clinic      Anticoagulation Summary as of 8/6/2018     INR goal 2.0-3.0   Today's INR 2.45   Warfarin maintenance plan 5 mg (5 mg x 1) every day   Full warfarin instructions 5 mg every day   Weekly warfarin total 35 mg   Plan last modified Nguyen Zuniga RN (12/21/2017)   Next INR check 9/17/2018   Priority INR   Target end date Indefinite    Indications   Atrial fibrillation (H) [I48.91] [I48.91]  Long-term (current) use of anticoagulants [Z79.01] [Z79.01]         Anticoagulation Episode Summary     INR check location     Preferred lab     Send INR reminders to [facility]    Comments Patient contact number: [phone]   Can leave results with Rica (friend)      Anticoagulation Care Providers     Provider Role Specialty Phone number    Deedee Baird MD Responsible Cardiology [phone]            See the Encounter Report to view Anticoagulation Flowsheet and Dosing Calendar (Go to Encounters tab in chart review, and find the Anticoagulation Therapy Visit)    Spoke with patient.    Leena Abebe RN

## 2018-08-06 NOTE — NURSING NOTE
Chief Complaint   Patient presents with     Recheck Medication     Patient is here for follow up       Niranjan Galaviz CMA ([facility]) at 10:21 AM on 8/6/2018

## 2018-08-06 NOTE — PROGRESS NOTES
HPI  83-year-old returns today with his wife for reevaluation.  He has been wearing his AFO and is not had any falls in the last 4 months or so.  He is been participating in physical therapy but is only doing the exercises in therapy has not been doing any regular home exercises.  He has had no problems or difficulty with the exercises but just lacks motivation and considers them boring.  His diet is marginal and his weight is down although with a recent upper tach.  He has had no associated dizziness lightheadedness or other complaints in association with this.  He is followed with cardiology and is scheduled for replacement of his ICD.  Has chronic anemia and recent evaluation of this was negative.  Blood pressures been under good control.  Past Medical History:   Diagnosis Date     Advanced open-angle glaucoma      Antiplatelet or antithrombotic long-term use      Atrial fibrillation (H)      CKD (chronic kidney disease), stage III 2005     Colon cancer (H)     Stage II-B colon cancer     Coronary artery disease     s/p CABG x 2, JEREMY x 2     Diverticulitis      Hyperlipidemia      Hypertension      Ischemic cardiomyopathy      MGUS (monoclonal gammopathy of unknown significance)      Nonsenile cataract     BE     Pacemaker      Polymorphic ventricular tachycardia (H)      Polymyalgia rheumatica (H)      PVD (posterior vitreous detachment), both eyes 2005     S/P ablation of atrial flutter 6/20/14    CTI     Stented coronary artery      SVT (supraventricular tachycardia) (H)     PPM/AICD for NSVT     Upper leg DVT (deep venous thromboembolism), chronic (H)     Left     Weight loss, unintentional 2017    15 lb in 4 months     Past Surgical History:   Procedure Laterality Date     C CABG, ARTERY-VEIN, FOUR  2006    LIMA-LAD, SVG-Rt PDA, SVG-OM2, SVG-Diag 1     C CABG, VEIN, SINGLE  1994    1-vessel CABG, SVG->PDRCA      CATARACT IOL, RT/LT Bilateral      COLONOSCOPY  3/13/2014    Procedure: COMBINED COLONOSCOPY,  SINGLE BIOPSY/POLYPECTOMY BY BIOPSY;;  Surgeon: Mary Gerber MD;  [facility]     COLONOSCOPY N/A 6/22/2015    Procedure: COLONOSCOPY;  Surgeon: Marilin Newman MD;  Location: [facility]     H ABLATION ATRIAL FLUTTER       HEART CATH DRUG ELUTING STENT PLACEMENT  4/19/2012    JEREMY x 2 to LCx     IMPLANT IMPLANTABLE CARDIOVERTER DEFIBRILLATOR  5-    ppm/aicd     KNEE SURGERY      right and left knee surgeries     LAPAROSCOPIC ASSISTED COLECTOMY LEFT (DESCENDING)  4/8/2014    Procedure: LAPAROSCOPIC ASSISTED COLECTOMY LEFT (DESCENDING);  Hand assisted Laparoscopic Sigmoid Colectomy , Laparoscopic mobilization of spleenic flexure *Latex Allergy*Anesthesia General with Block;  Surgeon: Chanelle Guzmán MD;  Location:  OR     REPAIR VALVE MITRAL  2006     30-mm Medtronic Julian ring      SELECTIVE LASER TRABECULOPLASTY (SLT) OD (RIGHT EYE)  4/10, 1/12,+1/9/13    RE     SELECTIVE LASER TRABECULOPLASTY (SLT) OS (LEFT EYE)  5/2012     SHOULDER SURGERY      right rotator cuff     Family History   Problem Relation Age of Onset     C.A.D. Father      Anesthesia Reaction No family hx of      Crohn Disease No family hx of      Ulcerative Colitis No family hx of      Cancer - colorectal No family hx of      Macular Degeneration No family hx of      Cancer No family hx of      No known family hx of skin cancer     Melanoma No family hx of      Skin Cancer No family hx of      Social History     Social History     Marital status:      Spouse name: N/A     Number of children: N/A     Years of education: N/A     Social History Main Topics     Smoking status: Former Smoker     Types: Cigarettes, Cigars     Quit date: 1/1/1968     Smokeless tobacco: Never Used     Alcohol use 0.0 oz/week     0 Standard drinks or equivalent per week      Comment: 2-3 drinks twice weekly     Drug use: No     Sexual activity: Not Asked     Other Topics Concern     None     Social History Narrative        Exam:  /77 (BP Location: Right arm, Patient Position: Sitting, Cuff Size: Adult Regular)  Pulse 78  Resp 16  Wt 66.9 kg (147 lb 6.4 oz)  BMI 22.41 kg/m2  147 lbs 6.4 oz  The patient is alert, oriented with a clear sensorium.   Skin shows no lesions or rashes and good turgor.   Head is normocephalic and atraumatic.    Neck shows no nodes, thyromegaly.     Lungs are clear.   Heart shows normal S1 and S2 without murmur or gallop.    Extremities show no edema.    ASSESSMENT  1 foot drop and peripheral neuropathy  2 hypertension controlled  3 coronary artery disease asymptomatic  4 chronic anemia likely chronic disease  5 hyperlipidemia on atorvastatin  6 is deconditioning    Plan  Reinforced the need for him to do his therapy exercises on a regular basis discussed charting his exercise regimen with this with a goal of getting him back on the tennis court and hitting against at least the ball machine.  We will update his immunizations today with a shingrix and a Pneumovax will have him follow up for physical examination in January and follow-up sooner immediately if any increased symptoms or problems I also gave him some samples of Boost to help with his nutritional intake  Over 25 minutes spent with patient the majority in counseling and coordinating care.    This note was completed using Dragon voice recognition software.  Although reviewed after completion, some word and grammatical errors may occur.    Roberto Sarmiento MD  General Internal Medicine  [facility] Center  [phone]

## 2018-08-07 ENCOUNTER — TELEPHONE (OUTPATIENT)
Dept: CARDIOLOGY | Facility: CLINIC | Age: 83
End: 2018-08-07

## 2018-08-07 NOTE — TELEPHONE ENCOUNTER
----- Message from Ramon Decker sent at 8/6/2018 12:54 PM CDT -----  Omar Cee,    Pt. Partner was wondering if you can give her a call in regarding of a follow up with you and selected surging for a gender rater change. Pt. Phone is [phone] ask for Jada Levine.      Thanks,  Ramon      Returned call. Nicolette was just wondering what the follow up will be after gen change. Informed her that their is a 1 week incision check with Device clinic and a 3 month follow up with EP.  No other questions.    Jaye Alexander CNP

## 2018-09-17 DIAGNOSIS — I48.0 PAROXYSMAL ATRIAL FIBRILLATION (H): ICD-10-CM

## 2018-09-17 DIAGNOSIS — Z79.01 LONG-TERM (CURRENT) USE OF ANTICOAGULANTS: ICD-10-CM

## 2018-09-17 LAB — INR PPP: 3.05 (ref 0.86–1.14)

## 2018-09-18 ENCOUNTER — ANTICOAGULATION THERAPY VISIT (OUTPATIENT)
Dept: ANTICOAGULATION | Facility: CLINIC | Age: 83
End: 2018-09-18

## 2018-09-18 DIAGNOSIS — Z79.01 LONG-TERM (CURRENT) USE OF ANTICOAGULANTS: ICD-10-CM

## 2018-09-18 DIAGNOSIS — I48.0 PAROXYSMAL ATRIAL FIBRILLATION (H): ICD-10-CM

## 2018-09-18 NOTE — MR AVS SNAPSHOT
Noe Florence   9/18/2018   Anticoagulation Therapy Visit    Description:  83 year old male   Provider:  Ciro Sharp, RN   Department:  [facility]           INR as of 9/18/2018     Today's INR 3.05! (9/17/2018)      Anticoagulation Summary as of 9/18/2018     INR goal 2.0-3.0   Today's INR 3.05! (9/17/2018)   Full warfarin instructions 9/18: 2.5 mg; Otherwise 5 mg every day   Next INR check 10/1/2018    Indications   Atrial fibrillation (H) [I48.91] [I48.91]  Long-term (current) use of anticoagulants [Z79.01] [Z79.01]         September 2018 Details    Sun Mon Tue Wed Thu Fri Sat           1                 2               3               4               5               6               7               8                 9               10               11               12               13               14               15                 16               17               18      2.5 mg   See details      19      5 mg         20      5 mg         21      5 mg         22      5 mg           23      5 mg         24      5 mg         25      5 mg         26      5 mg         27      5 mg         28      5 mg         29      5 mg           30      5 mg                Date Details   09/18 This INR check               How to take your warfarin dose     To take:  2.5 mg Take 0.5 of a 5 mg tablet.    To take:  5 mg Take 1 of the 5 mg tablets.           October 2018 Details    Sun Mon Tue Wed Thu Fri Sat      1            2               3               4               5               6                 7               8               9               10               11               12               13                 14               15               16               17               18               19               20                 21               22               23               24               25               26               27                 28               29               30               31                    Date Details   No additional details    Date of next INR:  10/1/2018         How to take your warfarin dose     To take:  5 mg Take 1 of the 5 mg tablets.

## 2018-09-18 NOTE — PROGRESS NOTES
ANTICOAGULATION FOLLOW-UP CLINIC VISIT    Patient Name:  Noe Florence  Date:  9/18/2018  Contact Type:  Telephone    SUBJECTIVE:     Patient Findings     Positives No Problem Findings    Comments Spoke to Noe.  Recommended a one time decrease in dose, 2.5mg today.  Will check an INR on 10/1 at his next appointment.  Patient stated he has been cleared for a generator change, it is not scheduled at this time.           OBJECTIVE    INR   Date Value Ref Range Status   09/17/2018 3.05 (H) 0.86 - 1.14 Final       ASSESSMENT / PLAN  INR assessment THER    Recheck INR In: 2 WEEKS    INR Location Clinic      Anticoagulation Summary as of 9/18/2018     INR goal 2.0-3.0   Today's INR 3.05! (9/17/2018)   Warfarin maintenance plan 5 mg (5 mg x 1) every day   Full warfarin instructions 9/18: 2.5 mg; Otherwise 5 mg every day   Weekly warfarin total 35 mg   Plan last modified Nguyen Zuniga RN (12/21/2017)   Next INR check 10/1/2018   Priority INR   Target end date Indefinite    Indications   Atrial fibrillation (H) [I48.91] [I48.91]  Long-term (current) use of anticoagulants [Z79.01] [Z79.01]         Anticoagulation Episode Summary     INR check location     Preferred lab     Send INR reminders to [facility]    Comments Patient contact number: [phone]   Can leave results with Rica (friend)      Anticoagulation Care Providers     Provider Role Specialty Phone number    Deedee Baird MD Responsible Cardiology [phone]            See the Encounter Report to view Anticoagulation Flowsheet and Dosing Calendar (Go to Encounters tab in chart review, and find the Anticoagulation Therapy Visit)    Spoke to Noe.  Recommended a one time decrease in dose, 2.5mg today.  Will check an INR on 10/1 at his next appointment.  Patient stated he has been cleared for a generator change, it is not scheduled at this time.      Ciro Sharp, BYRON

## 2018-10-01 ENCOUNTER — RADIANT APPOINTMENT (OUTPATIENT)
Dept: CT IMAGING | Facility: CLINIC | Age: 83
End: 2018-10-01
Attending: INTERNAL MEDICINE
Payer: COMMERCIAL

## 2018-10-01 ENCOUNTER — ANTICOAGULATION THERAPY VISIT (OUTPATIENT)
Dept: ANTICOAGULATION | Facility: CLINIC | Age: 83
End: 2018-10-01

## 2018-10-01 DIAGNOSIS — I48.0 PAROXYSMAL ATRIAL FIBRILLATION (H): ICD-10-CM

## 2018-10-01 DIAGNOSIS — C18.9 MALIGNANT NEOPLASM OF COLON, UNSPECIFIED PART OF COLON (H): ICD-10-CM

## 2018-10-01 DIAGNOSIS — Z79.01 LONG-TERM (CURRENT) USE OF ANTICOAGULANTS: ICD-10-CM

## 2018-10-01 LAB
ALBUMIN SERPL-MCNC: 3.7 G/DL (ref 3.4–5)
ALP SERPL-CCNC: 109 U/L (ref 40–150)
ALT SERPL W P-5'-P-CCNC: 22 U/L (ref 0–70)
ANION GAP SERPL CALCULATED.3IONS-SCNC: 8 MMOL/L (ref 3–14)
AST SERPL W P-5'-P-CCNC: 20 U/L (ref 0–45)
BASOPHILS # BLD AUTO: 0 10E9/L (ref 0–0.2)
BASOPHILS NFR BLD AUTO: 0.4 %
BILIRUB SERPL-MCNC: 0.4 MG/DL (ref 0.2–1.3)
BUN SERPL-MCNC: 54 MG/DL (ref 7–30)
CALCIUM SERPL-MCNC: 9.3 MG/DL (ref 8.5–10.1)
CEA SERPL-MCNC: 5.2 UG/L (ref 0–2.5)
CHLORIDE SERPL-SCNC: 108 MMOL/L (ref 94–109)
CO2 SERPL-SCNC: 24 MMOL/L (ref 20–32)
CREAT BLD-MCNC: 1.6 MG/DL (ref 0.66–1.25)
CREAT SERPL-MCNC: 1.62 MG/DL (ref 0.66–1.25)
DIFFERENTIAL METHOD BLD: ABNORMAL
EOSINOPHIL # BLD AUTO: 0.2 10E9/L (ref 0–0.7)
EOSINOPHIL NFR BLD AUTO: 2.1 %
ERYTHROCYTE [DISTWIDTH] IN BLOOD BY AUTOMATED COUNT: 14.7 % (ref 10–15)
GFR SERPL CREATININE-BSD FRML MDRD: 41 ML/MIN/1.7M2
GFR SERPL CREATININE-BSD FRML MDRD: 41 ML/MIN/1.7M2
GLUCOSE SERPL-MCNC: 94 MG/DL (ref 70–99)
HCT VFR BLD AUTO: 32.1 % (ref 40–53)
HGB BLD-MCNC: 10.5 G/DL (ref 13.3–17.7)
IMM GRANULOCYTES # BLD: 0 10E9/L (ref 0–0.4)
IMM GRANULOCYTES NFR BLD: 0.3 %
INR PPP: 2.99 (ref 0.86–1.14)
LYMPHOCYTES # BLD AUTO: 1 10E9/L (ref 0.8–5.3)
LYMPHOCYTES NFR BLD AUTO: 13.4 %
MCH RBC QN AUTO: 30.3 PG (ref 26.5–33)
MCHC RBC AUTO-ENTMCNC: 32.7 G/DL (ref 31.5–36.5)
MCV RBC AUTO: 93 FL (ref 78–100)
MONOCYTES # BLD AUTO: 1 10E9/L (ref 0–1.3)
MONOCYTES NFR BLD AUTO: 12.2 %
NEUTROPHILS # BLD AUTO: 5.6 10E9/L (ref 1.6–8.3)
NEUTROPHILS NFR BLD AUTO: 71.6 %
NRBC # BLD AUTO: 0 10*3/UL
NRBC BLD AUTO-RTO: 0 /100
PLATELET # BLD AUTO: 218 10E9/L (ref 150–450)
POTASSIUM SERPL-SCNC: 4.4 MMOL/L (ref 3.4–5.3)
PROT SERPL-MCNC: 7.9 G/DL (ref 6.8–8.8)
RBC # BLD AUTO: 3.46 10E12/L (ref 4.4–5.9)
SODIUM SERPL-SCNC: 140 MMOL/L (ref 133–144)
WBC # BLD AUTO: 7.8 10E9/L (ref 4–11)

## 2018-10-01 RX ORDER — IOPAMIDOL 755 MG/ML
91 INJECTION, SOLUTION INTRAVASCULAR ONCE
Status: COMPLETED | OUTPATIENT
Start: 2018-10-01 | End: 2018-10-01

## 2018-10-01 RX ADMIN — IOPAMIDOL 91 ML: 755 INJECTION, SOLUTION INTRAVASCULAR at 10:38

## 2018-10-01 NOTE — PROGRESS NOTES
ANTICOAGULATION FOLLOW-UP CLINIC VISIT    Patient Name:  Noe Florence  Date:  10/1/2018  Contact Type:  Telephone    SUBJECTIVE:        OBJECTIVE    INR   Date Value Ref Range Status   10/01/2018 2.99 (H) 0.86 - 1.14 Final       ASSESSMENT / PLAN  INR assessment THER    Recheck INR In: 3 WEEKS    INR Location Clinic      Anticoagulation Summary as of 10/1/2018     INR goal 2.0-3.0   Today's INR 2.99   Warfarin maintenance plan 5 mg (5 mg x 1) every day   Full warfarin instructions 5 mg every day   Weekly warfarin total 35 mg   No change documented Danya Edwards RN   Plan last modified Nguyen Zuniga RN (12/21/2017)   Next INR check 10/22/2018   Priority INR   Target end date Indefinite    Indications   Atrial fibrillation (H) [I48.91] [I48.91]  Long-term (current) use of anticoagulants [Z79.01] [Z79.01]         Anticoagulation Episode Summary     INR check location     Preferred lab     Send INR reminders to [facility]    Comments Patient contact number: [phone]   Can leave results with Rica (friend)      Anticoagulation Care Providers     Provider Role Specialty Phone number    Deedee Baird MD Responsible Cardiology [phone]            See the Encounter Report to view Anticoagulation Flowsheet and Dosing Calendar (Go to Encounters tab in chart review, and find the Anticoagulation Therapy Visit)    Left message for patient with results and dosing recommendations. Asked patient to call back to report any missed doses, falls, signs and symptoms of bleeding or clotting, any changes in health, medication, or diet. Asked patient to call back with any questions or concerns.      Danya Edwards RN

## 2018-10-01 NOTE — DISCHARGE INSTRUCTIONS

## 2018-10-01 NOTE — MR AVS SNAPSHOT
Noe Florence   10/1/2018   Anticoagulation Therapy Visit    Description:  83 year old male   Provider:  Danya Edwards, RN   Department:  [facility]           INR as of 10/1/2018     Today's INR 2.99      Anticoagulation Summary as of 10/1/2018     INR goal 2.0-3.0   Today's INR 2.99   Full warfarin instructions 5 mg every day   Next INR check 10/22/2018    Indications   Atrial fibrillation (H) [I48.91] [I48.91]  Long-term (current) use of anticoagulants [Z79.01] [Z79.01]         October 2018 Details    Sun Mon Tue Wed Thu Fri Sat      1      5 mg   See details      2      5 mg         3      5 mg         4      5 mg         5      5 mg         6      5 mg           7      5 mg         8      5 mg         9      5 mg         10      5 mg         11      5 mg         12      5 mg         13      5 mg           14      5 mg         15      5 mg         16      5 mg         17      5 mg         18      5 mg         19      5 mg         20      5 mg           21      5 mg         22            23               24               25               26               27                 28               29               30               31                   Date Details   10/01 This INR check       Date of next INR:  10/22/2018         How to take your warfarin dose     To take:  5 mg Take 1 of the 5 mg tablets.

## 2018-10-02 ENCOUNTER — TRANSFERRED RECORDS (OUTPATIENT)
Dept: HEALTH INFORMATION MANAGEMENT | Facility: CLINIC | Age: 83
End: 2018-10-02

## 2018-10-02 ENCOUNTER — ONCOLOGY VISIT (OUTPATIENT)
Dept: ONCOLOGY | Facility: CLINIC | Age: 83
End: 2018-10-02
Attending: NURSE PRACTITIONER
Payer: COMMERCIAL

## 2018-10-02 VITALS
SYSTOLIC BLOOD PRESSURE: 112 MMHG | OXYGEN SATURATION: 100 % | BODY MASS INDEX: 21.63 KG/M2 | HEIGHT: 68 IN | WEIGHT: 142.7 LBS | HEART RATE: 71 BPM | RESPIRATION RATE: 14 BRPM | TEMPERATURE: 97.1 F | DIASTOLIC BLOOD PRESSURE: 61 MMHG

## 2018-10-02 DIAGNOSIS — Z85.038 PERSONAL HISTORY OF COLON CANCER: Primary | ICD-10-CM

## 2018-10-02 DIAGNOSIS — C18.9 MALIGNANT NEOPLASM OF COLON, UNSPECIFIED PART OF COLON (H): Primary | ICD-10-CM

## 2018-10-02 PROCEDURE — 99213 OFFICE O/P EST LOW 20 MIN: CPT | Mod: ZP | Performed by: NURSE PRACTITIONER

## 2018-10-02 PROCEDURE — G0463 HOSPITAL OUTPT CLINIC VISIT: HCPCS | Mod: ZF

## 2018-10-02 ASSESSMENT — PAIN SCALES - GENERAL: PAINLEVEL: NO PAIN (0)

## 2018-10-02 NOTE — PROGRESS NOTES
Reason for Visit: seen in f/u of resected stage II colon cancer    Oncology HPI: Mr. Florence is a 83 year old man seen in f/u of resected stage II colon cancer, s/p sigmoid colectomy in April 2014. He has been followed without recurrence. He has had persistent elevations in his CEA without radiographic findings of recurrence. In the past, there had been a questionable liver lesion, that resolved on repeat imaging.    Interval history: Since his last visit, he has been feeling well. Has had a slow weight loss of 20# over the last 2 years, minimal change in the past 6 months. Appetite is good, but he is eating less. His S.O. Attributes this to his schedule of staying up all night and sleeping in the day. He eats about 2 meals a day and takes a protein supplement daily. Bowels are regular. No melena or hematochezia. No adominal pain, nausea, reflux. Has some urinary frequency, but not significantly changed. Activity has been limited by his neuropathy. He is able to take short walks daily. Earlier this year, his exercise tolerance was less, but he went through PT with improvement. No cough, sob, cp, palpitation. No fevers/chills/sweats. No headaches, vision changes. No focal weakness, difficulty with speech or swallowing.         Past Medical History:   Diagnosis Date     Advanced open-angle glaucoma      Antiplatelet or antithrombotic long-term use      Atrial fibrillation (H)      CKD (chronic kidney disease), stage III (H) 2005     Colon cancer (H)     Stage II-B colon cancer     Coronary artery disease     s/p CABG x 2, JEREMY x 2     Diverticulitis      Hyperlipidemia      Hypertension      Ischemic cardiomyopathy      MGUS (monoclonal gammopathy of unknown significance)      Nonsenile cataract     BE     Pacemaker      Polymorphic ventricular tachycardia (H)      Polymyalgia rheumatica (H)      PVD (posterior vitreous detachment), both eyes 2005     S/P ablation of atrial flutter 6/20/14    CTI     Stented coronary  artery      SVT (supraventricular tachycardia) (H)     PPM/AICD for NSVT     Upper leg DVT (deep venous thromboembolism), chronic (H)     Left     Weight loss, unintentional 2017    15 lb in 4 months       Current Outpatient Prescriptions   Medication Sig Dispense Refill     aspirin 81 MG tablet Take 1 tablet by mouth daily.       atorvastatin (LIPITOR) 40 MG tablet Take 1 tablet (40 mg) by mouth daily 100 tablet 3     bacitracin ointment Apply topically 2 times daily APPLY TO LEFT LEG TWO TIMES PER DAY 28 g 0     Blood Pressure Monitor KIT 1 Units daily (Patient not taking: Reported on 8/2/2018) 1 kit 0     Cholecalciferol (VITAMIN D-3 PO) Take 1 tablet by mouth 2 times daily       ciclopirox (LOPROX) 0.77 % cream Apply topically 2 times daily To feet and toenails. 90 g 6     cyanocobalamin (VITAMIN  B-12) 1000 MCG tablet Take 2 tablets (2,000 mcg) by mouth daily 180 tablet 3     doxazosin (CARDURA) 2 MG tablet Take 1/2 tab in the morning and 1/2 tab in the evening 90 tablet 3     fluticasone (FLONASE) 50 MCG/ACT nasal spray Spray 2 sprays into both nostrils daily (Patient not taking: Reported on 5/29/2018) 3 Package 0     ketoconazole (NIZORAL) 2 % cream Apply topically daily On hold 60 g 3     loratadine (CLARITIN) 10 MG tablet Take 10 mg by mouth as needed        metoprolol tartrate (LOPRESSOR) 25 MG tablet Take 1 tablet (25 mg) by mouth 2 times daily 180 tablet 3     mirtazapine (REMERON) 15 MG tablet Take 15 mg by mouth At Bedtime.       Multiple Vitamins-Minerals (CENTRUM SILVER) per tablet Take 1 tablet by mouth daily. AM        order for DME 20-30 mm Hg knee length compression stockings.  Measure and fit.  Style and color per patient preference.  Doquynh n Aracelis per patient need. 1 Units 0     sertraline (ZOLOFT) 100 MG tablet Take 100 mg by mouth every evening.       triamcinolone (KENALOG) 0.1 % cream Apply topically 2 times daily For up to two weeks in a row (Patient not taking: Reported on 7/3/2018) 80 g  "3     warfarin (COUMADIN) 5 MG tablet Take 1 tablet by mouth daily or as directed by the Coumadin clinic. 90 tablet 3     warfarin (COUMADIN) 5 MG tablet 7.5 mg on Wed; 5 mg all other days  or as instructed by coumadin clinic. (Patient taking differently: 5 mg all days  or as instructed by coumadin clinic.) 35 tablet 5          Allergies   Allergen Reactions     Latex Rash     Rash         Exam: alert, appears slim. Blood pressure 112/61, pulse 71, temperature 97.1  F (36.2  C), temperature source Oral, resp. rate 14, height 1.727 m (5' 7.99\"), weight 64.7 kg (142 lb 11.2 oz), SpO2 100 %.  Wt Readings from Last 4 Encounters:   10/02/18 64.7 kg (142 lb 11.2 oz)   08/06/18 66.9 kg (147 lb 6.4 oz)   08/02/18 64.9 kg (143 lb 1.6 oz)   07/03/18 65.3 kg (144 lb)     Oropharynx is moist, no focal lesion. No icterus. Neck supple and without adenopathy. Lungs:CTA. Heart: irregular rhythm, no murmur or rub. Abdomen: soft, nontender, BS active. No organomegaly. Extremities: wearing a ankle brace and compression stocking on the LLE    Labs: Results for LEROY MACHADO (MRN [id number]) as of 10/2/2018 07:44   Ref. Range 10/1/2018 10:02   Sodium Latest Ref Range: 133 - 144 mmol/L 140   Potassium Latest Ref Range: 3.4 - 5.3 mmol/L 4.4   Chloride Latest Ref Range: 94 - 109 mmol/L 108   Carbon Dioxide Latest Ref Range: 20 - 32 mmol/L 24   Urea Nitrogen Latest Ref Range: 7 - 30 mg/dL 54 (H)   Creatinine Latest Ref Range: 0.66 - 1.25 mg/dL 1.62 (H)   GFR Estimate Latest Ref Range: >60 mL/min/1.7m2 41 (L)   GFR Estimate If Black Latest Ref Range: >60 mL/min/1.7m2 49 (L)   Calcium Latest Ref Range: 8.5 - 10.1 mg/dL 9.3   Anion Gap Latest Ref Range: 3 - 14 mmol/L 8   Albumin Latest Ref Range: 3.4 - 5.0 g/dL 3.7   Protein Total Latest Ref Range: 6.8 - 8.8 g/dL 7.9   Bilirubin Total Latest Ref Range: 0.2 - 1.3 mg/dL 0.4   Alkaline Phosphatase Latest Ref Range: 40 - 150 U/L 109   ALT Latest Ref Range: 0 - 70 U/L 22   AST Latest Ref Range: " 0 - 45 U/L 20   Glucose Latest Ref Range: 70 - 99 mg/dL 94   WBC Latest Ref Range: 4.0 - 11.0 10e9/L 7.8   Hemoglobin Latest Ref Range: 13.3 - 17.7 g/dL 10.5 (L)   Hematocrit Latest Ref Range: 40.0 - 53.0 % 32.1 (L)   Platelet Count Latest Ref Range: 150 - 450 10e9/L 218   RBC Count Latest Ref Range: 4.4 - 5.9 10e12/L 3.46 (L)   MCV Latest Ref Range: 78 - 100 fl 93   MCH Latest Ref Range: 26.5 - 33.0 pg 30.3   MCHC Latest Ref Range: 31.5 - 36.5 g/dL 32.7   RDW Latest Ref Range: 10.0 - 15.0 % 14.7   Diff Method Unknown Automated Method   % Neutrophils Latest Units: % 71.6   % Lymphocytes Latest Units: % 13.4   % Monocytes Latest Units: % 12.2   % Eosinophils Latest Units: % 2.1   % Basophils Latest Units: % 0.4   % Immature Granulocytes Latest Units: % 0.3   Nucleated RBCs Latest Ref Range: 0 /100 0   Absolute Neutrophil Latest Ref Range: 1.6 - 8.3 10e9/L 5.6   Absolute Lymphocytes Latest Ref Range: 0.8 - 5.3 10e9/L 1.0   Absolute Monocytes Latest Ref Range: 0.0 - 1.3 10e9/L 1.0   Absolute Eosinophils Latest Ref Range: 0.0 - 0.7 10e9/L 0.2   Absolute Basophils Latest Ref Range: 0.0 - 0.2 10e9/L 0.0   Abs Immature Granulocytes Latest Ref Range: 0 - 0.4 10e9/L 0.0   Absolute Nucleated RBC Unknown 0.0   INR Latest Ref Range: 0.86 - 1.14  2.99 (H)   CEA Latest Ref Range: 0 - 2.5 ug/L 5.2 (H)   Results for LEROY MACHADO (MRN [id number]) as of 10/2/2018 07:44   Ref. Range 9/26/2016 17:08 9/26/2017 13:33 11/27/2017 17:45 3/30/2018 11:29 10/1/2018 10:02   CEA Latest Ref Range: 0 - 2.5 ug/L 3.4 (H) 4.0 (H) 3.6 (H) 3.8 (H) 5.2 (H)       Imaging: EXAMINATION: CT CHEST/ABDOMEN/PELVIS W CONTRAST, 10/1/2018 10:48 AM     TECHNIQUE:  Helical CT images from the thoracic inlet through the  symphysis pubis were obtained with contrast. Contrast dose: Isovue 370  91mls     COMPARISON: 3/30/2018, 9/26/2017, 3/31/2017, 8/14/2015     HISTORY: Malignant neoplasm of colon, unspecified part of colon (H)     FINDINGS:     Chest: The central  tracheobronchial tree is patent. Nonenlarged heart.  Atherosclerotic calcification in the aortic arch, great vessels and  coronary arteries. Left chest implantable cardiac defibrillator. No  lymphadenopathy in the chest. Unremarkable thyroid. Biapical scarring.  No pneumothorax or pleural effusion. Left basilar  fibroatelectasis/scar. Few scattered tiny pulmonary nodules and areas  of mucus plugging are not significantly, changed for example (series  4, image 42) in the right upper lobe.     Abdomen and pelvis: No suspicious liver lesion. The gallbladder,  pancreas, and appendix are unremarkable. Unchanged coarse  calcification in the spleen. Left renal cyst. Bilateral renal  hypodensities are too small to characterize with CT. Punctate calyceal  tip stone in the left kidney (series 3, image 339). Thickening of the  left adrenal gland is not significantly changed over multiple prior  exams.  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 Aortobiiliac  atherosclerotic calcification. 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 No dilated  bowel. Surgical changes of partial colectomy in the left lower  quadrant. Enlarged prostate with calcifications. No lymphadenopathy in  the abdomen or pelvis. Small to moderate hiatal hernia.     Bones and soft tissues: No suspicious osseous abnormality. Multilevel  degenerative changes in the spine. Sternotomy.         IMPRESSION:  1.  Postoperative changes of partial colectomy for colon cancer. No  evidence of metastatic disease in the chest, abdomen, or pelvis.  2.  Small to moderate hiatal hernia.  3.  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   Additional incidental and chronic findings as above.     KARLA BOGGS MD    Impression/plan:   1. Resected stage II colon cancer, s/p resection in April 2014.   -has a rising CEA and progressive weight loss, but no findings of recurrence on CT imaging. Is working with  his primary on the weight loss concerns. Encouraged him to increase the protein supplements if not eating regular meals  -recommended f/u CEA in 3 months, if continuing to rise, will obtain CT CAP at that time. If stable or declining will repeat CEA and CT in 6 months. Will be approaching 5 years from resection at that time  -due for a colonoscopy. Sent message to colorectal surgery about this.       colonoscopy

## 2018-10-02 NOTE — MR AVS SNAPSHOT
After Visit Summary   10/2/2018    Noe Florence    MRN: [id number]           Patient Information     Date Of Birth          1935        Visit Information        Provider Department      10/2/2018 11:10 AM Angie Flower APRN CNP [facility]        Today's Diagnoses     Malignant neoplasm of colon, unspecified part of colon (H)    -  1       Follow-ups after your visit        Your next 10 appointments already scheduled     Oct 04, 2018  1:45 PM CDT   (Arrive by 1:30 PM)   RETURN ARRHYTHMIA with MARIA LUISA Rondon CNP [facility] ([facility])    [address]  [address]  [facility] [address] [phone]            Nov 20, 2018 11:00 AM CST   US CAROTID BILATERAL with UCUSV2   [facility] ([facility])    [address]  1st Floor  [facility] [id number] [phone]           How do I prepare for my exam? (Food and drink instructions) No Food and Drink Restrictions.  How do I prepare for my exam? (Other instructions) You do not need to do anything special to prepare for your exam.  What should I wear: Wear comfortable clothes.  How long does the exam take: Most ultrasounds take 30 to 60 minutes.  What should I bring: Bring a list of your medicines, including vitamins, minerals and over-the-counter drugs. It is safest to leave personal items at home.  Do I need a :  No  is needed.  What do I need to tell my doctor: Tell your doctor about any allergies you may have.  What should I do after the exam: No restrictions, You may resume normal activities.  What is this test: An ultrasound uses sound waves to make pictures of the body. Sound waves do not cause pain. The only discomfort may be the pressure of the wand against your skin or full bladder.  Who should I call with questions: If you have any questions, please call the Imaging Department where you will have  your exam. Directions, parking instructions, and other information is available on our website, [email or web address].            Nov 27, 2018  1:30 PM CST   (Arrive by 1:15 PM)   Return Vascular Visit with Frida Desai MD   [facility])    [address] [place] [address]  [address]  [facility] [place] [address]   [phone]            Dec 03, 2018 10:30 AM CST   (Arrive by 10:15 AM)   Implanted Defibulator with  Cv Device [address] [place] [address] ([facility])    [address] [place] [address]  3rd Floor  [id number]               Dec 03, 2018  1:30 PM CST   (Arrive by 1:15 PM)   RETURN ARRHYTHMIA with Deedee Baird MD   [facility])    [address] [place] [address]  [address]  [facility] [place] [address]   [phone]            Jan 07, 2019 10:00 AM CST   (Arrive by 9:45 AM)   Return Visit with Roberto Sarmiento MD   [facility] ([facility])    [address]  4th Floor  [facility] [place] [address] [phone]            Jan 08, 2019 11:00 AM CST   Masonic Lab Draw with  MASONIC LAB DRAW   [facility] Masonic Lab Draw ([facility])    [address] [place] [address]  [address]  [facility] [place] [address] [phone]            Jan 08, 2019 12:30 PM CST   Return Neuropathy Visit with Jase Duarte MD   [facility] ([facility])    [address]  [address]  [facility] [place] [address]   [phone]            Apr 02, 2019 10:15 AM CDT   LAB with  LAB   [facility] Lab ([facility])    [address] [place] [address]  1st Floor  [facility] [place] [address] [phone]           Please do not eat 10-12 hours before your appointment if you are coming in fasting for labs on lipids, cholesterol, or glucose (sugar). This does not apply to pregnant women. Water, hot tea and black coffee (with nothing added) are okay. Do not  drink other fluids, diet soda or chew gum.            Apr 02, 2019 11:00 AM CDT   CT CHEST/ABDOMEN/PELVIS W CONTRAST with UCCT2   [facility] CT ([facility])    [address]  [address] [facility] [address] [phone]           How do I prepare for my exam? (Food and drink instructions) To prepare: Do not eat or drink for 2 hours before your exam. If you need to take medicine, you may take it with small sips of water. (We may ask you to take liquid medicine as well.)  How do I prepare for my exam? (Other instructions) Please arrive 30 minutes early for your CT.  Once in the department you might be asked to drink water 15-20 minutes prior to your exam.  If indicated you may be asked to drink an oral contrast in advance of your CT.  If this is the case, the imaging team will let you know or be in contact with you prior to your appointment  Patients over 70 or patients with diabetes or kidney problems: If you haven t had a blood test (creatinine test) within the last 30 days, the Cardiologist/Radiologist may require you to get this test prior to your exam.  If you have diabetes:  Continue to take your metformin medication on the day of your exam  What should I wear: Please wear loose clothing, such as a sweat suit or jogging clothes. Avoid snaps, zippers and other metal. We may ask you to undress and put on a hospital gown.  How long does the exam take: Most scans take less than 20 minutes.  What should I bring: Please bring any scans or X-rays taken at other hospitals, if similar tests were done. Also bring a list of your medicines, including vitamins, minerals and over-the-counter drugs. It is safest to leave personal items at home.  Do I need a : No  is needed.  What do I need to tell my doctor? Be sure to tell your doctor: * If you have any allergies. * If there s any chance you are pregnant. * If you are breastfeeding.  What should I do after the  exam: No restrictions, You may resume normal activities.  What is this test: A CT (computed tomography) scan is a series of pictures that allows us to look inside your body. The scanner creates images of the body in cross sections, much like slices of bread. This helps us see any problems more clearly. You may receive contrast (X-ray dye) before or during your scan. You will be asked to drink the contrast.  Who should I call with questions: If you have any questions, please call the Imaging Department where you will have your exam. Directions, parking instructions, and other information is available on our website, [email or web address].              Future tests that were ordered for you today     Open Standing Orders        Priority Remaining Interval Expires Ordered    CBC with platelets differential Routine 2/2 every 3 months 10/2/2019 10/2/2018    CEA Routine 2/2 every 3 months 10/2/2019 10/2/2018    Comprehensive metabolic panel Routine 2/2 every 3 months 10/2/2019 10/2/2018          Open Future Orders        Priority Expected Expires Ordered    CT Chest/Abdomen/Pelvis w Contrast Routine 4/2/2019 10/2/2019 10/2/2018            Who to contact     If you have questions or need follow up information about today's clinic visit or your schedule please contact [facility] directly at [phone].  Normal or non-critical lab and imaging results will be communicated to you by MyChart, letter or phone within 4 business days after the clinic has received the results. If you do not hear from us within 7 days, please contact the clinic through MyChart or phone. If you have a critical or abnormal lab result, we will notify you by phone as soon as possible.  Submit refill requests through [employer] or call your pharmacy and they will forward the refill request to us. Please allow 3 business days for your refill to be completed.          Additional Information About Your Visit        MyChart Information      "[employer] gives you secure access to your electronic health record. If you see a primary care provider, you can also send messages to your care team and make appointments. If you have questions, please call your primary care clinic.  If you do not have a primary care provider, please call [phone] and they will assist you.        Care EveryWhere ID     This is your Care EveryWhere ID. This could be used by other organizations to access your [facility] medical records  [id number]        Your Vitals Were     Pulse Temperature Respirations Height Pulse Oximetry BMI (Body Mass Index)    71 97.1  F (36.2  C) (Oral) 14 1.727 m (5' 7.99\") 100% 21.7 kg/m2       Blood Pressure from Last 3 Encounters:   10/02/18 112/61   08/06/18 131/77   08/02/18 129/81    Weight from Last 3 Encounters:   10/02/18 64.7 kg (142 lb 11.2 oz)   08/06/18 66.9 kg (147 lb 6.4 oz)   08/02/18 64.9 kg (143 lb 1.6 oz)                 Today's Medication Changes          These changes are accurate as of 10/2/18 11:48 AM.  If you have any questions, ask your nurse or doctor.               These medicines have changed or have updated prescriptions.        Dose/Directions    * warfarin 5 MG tablet   Commonly known as:  COUMADIN   This may have changed:  additional instructions   Used for:  Atrial flutter (H)        7.5 mg on Wed; 5 mg all other days  or as instructed by coumadin clinic.   Quantity:  35 tablet   Refills:  5       * warfarin 5 MG tablet   Commonly known as:  COUMADIN   This may have changed:  Another medication with the same name was changed. Make sure you understand how and when to take each.   Used for:  Paroxysmal atrial fibrillation (H), Long-term (current) use of anticoagulants        Take 1 tablet by mouth daily or as directed by the Coumadin clinic.   Quantity:  90 tablet   Refills:  3       * Notice:  This list has 2 medication(s) that are the same as other medications prescribed for you. Read the directions carefully, and ask your " doctor or other care provider to review them with you.             Primary Care Provider Office Phone # Fax #    Roberto Sarmiento [phone]       [address] [place] [address]        Equal Access to Services     LEE CASTILLO : Hadamanda olsen ritao Soomaali, waaxda luqadaha, qaybta kaalmada adeegyada, sil flavioin hayaajesse easleyidalia bernabe bertram minor. So [facility] [phone].    ATENCIÓN: Si habla español, tiene a aguirre disposición servicios gratuitos de asistencia lingüística. Llame al [phone].    We comply with applicable federal civil rights laws and [place] laws. We do not discriminate on the basis of race, color, national origin, age, disability, sex, sexual orientation, or gender identity.            Thank you!     Thank you for choosing [facility]  for your care. Our goal is always to provide you with excellent care. Hearing back from our patients is one way we can continue to improve our services. Please take a few minutes to complete the written survey that you may receive in the mail after your visit with us. Thank you!             Your Updated Medication List - Protect others around you: Learn how to safely use, store and throw away your medicines at www.disposemymeds.org.          This list is accurate as of 10/2/18 11:48 AM.  Always use your most recent med list.                   Brand Name Dispense Instructions for use Diagnosis    aspirin 81 MG tablet      Take 1 tablet by mouth daily.        atorvastatin 40 MG tablet    LIPITOR    100 tablet    Take 1 tablet (40 mg) by mouth daily    Hyperlipidemia, unspecified hyperlipidemia type       bacitracin ointment     28 g    Apply topically 2 times daily APPLY TO LEFT LEG TWO TIMES PER DAY    Left leg cellulitis       Blood Pressure Monitor Kit     1 kit    1 Units daily    Hypertension       CENTRUM SILVER per tablet      Take 1 tablet by mouth daily. AM        ciclopirox 0.77 % cream    LOPROX    90  g    Apply topically 2 times daily To feet and toenails.    Dermatophytosis of nail, Tinea pedis of both feet       cyanocobalamin 1000 MCG tablet    vitamin  B-12    180 tablet    Take 2 tablets (2,000 mcg) by mouth daily    Anemia       doxazosin 2 MG tablet    CARDURA    90 tablet    Take 1/2 tab in the morning and 1/2 tab in the evening    Essential hypertension       fluticasone 50 MCG/ACT spray    FLONASE    3 Package    Spray 2 sprays into both nostrils daily    Unspecified sinusitis (chronic)       ketoconazole 2 % cream    NIZORAL    60 g    Apply topically daily On hold    Tinea corporis       loratadine 10 MG tablet    CLARITIN     Take 10 mg by mouth as needed        metoprolol tartrate 25 MG tablet    LOPRESSOR    180 tablet    Take 1 tablet (25 mg) by mouth 2 times daily    Coronary artery disease involving native heart without angina pectoris, unspecified vessel or lesion type       mirtazapine 15 MG tablet    REMERON     Take 15 mg by mouth At Bedtime.        order for DME     1 Units    20-30 mm Hg knee length compression stockings.  Measure and fit.  Style and color per patient preference.  Doff n Aracelis per patient need.    PVD (peripheral vascular disease) (H), Ulcer of left lower extremity, limited to breakdown of skin (H)       triamcinolone 0.1 % cream    KENALOG    80 g    Apply topically 2 times daily For up to two weeks in a row    Atopic eczema       VITAMIN D-3 PO      Take 1 tablet by mouth 2 times daily        * warfarin 5 MG tablet    COUMADIN    35 tablet    7.5 mg on Wed; 5 mg all other days  or as instructed by coumadin clinic.    Atrial flutter (H)       * warfarin 5 MG tablet    COUMADIN    90 tablet    Take 1 tablet by mouth daily or as directed by the Coumadin clinic.    Paroxysmal atrial fibrillation (H), Long-term (current) use of anticoagulants       ZOLOFT 100 MG tablet   Generic drug:  sertraline      Take 100 mg by mouth every evening.        * Notice:  This list has 2  medication(s) that are the same as other medications prescribed for you. Read the directions carefully, and ask your doctor or other care provider to review them with you.

## 2018-10-02 NOTE — LETTER
10/2/2018       RE: Noe Florence  [address] [facility] [address]     Dear Colleague,    Thank you for referring your patient, Noe Florence, to the [facility]. Please see a copy of my visit note below.    Reason for Visit: seen in f/u of resected stage II colon cancer    Oncology HPI: Mr. Florence is a 83 year old man seen in f/u of resected stage II colon cancer, s/p sigmoid colectomy in April 2014. He has been followed without recurrence. He has had persistent elevations in his CEA without radiographic findings of recurrence. In the past, there had been a questionable liver lesion, that resolved on repeat imaging.    Interval history: Since his last visit, he has been feeling well. Has had a slow weight loss of 20# over the last 2 years, minimal change in the past 6 months. Appetite is good, but he is eating less. His S.O. Attributes this to his schedule of staying up all night and sleeping in the day. He eats about 2 meals a day and takes a protein supplement daily. Bowels are regular. No melena or hematochezia. No adominal pain, nausea, reflux. Has some urinary frequency, but not significantly changed. Activity has been limited by his neuropathy. He is able to take short walks daily. Earlier this year, his exercise tolerance was less, but he went through PT with improvement. No cough, sob, cp, palpitation. No fevers/chills/sweats. No headaches, vision changes. No focal weakness, difficulty with speech or swallowing.         Past Medical History:   Diagnosis Date     Advanced open-angle glaucoma      Antiplatelet or antithrombotic long-term use      Atrial fibrillation (H)      CKD (chronic kidney disease), stage III (H) 2005     Colon cancer (H)     Stage II-B colon cancer     Coronary artery disease     s/p CABG x 2, JEREMY x 2     Diverticulitis      Hyperlipidemia      Hypertension      Ischemic cardiomyopathy      MGUS (monoclonal gammopathy of unknown significance)       Nonsenile cataract     BE     Pacemaker      Polymorphic ventricular tachycardia (H)      Polymyalgia rheumatica (H)      PVD (posterior vitreous detachment), both eyes 2005     S/P ablation of atrial flutter 6/20/14    CTI     Stented coronary artery      SVT (supraventricular tachycardia) (H)     PPM/AICD for NSVT     Upper leg DVT (deep venous thromboembolism), chronic (H)     Left     Weight loss, unintentional 2017    15 lb in 4 months       Current Outpatient Prescriptions   Medication Sig Dispense Refill     aspirin 81 MG tablet Take 1 tablet by mouth daily.       atorvastatin (LIPITOR) 40 MG tablet Take 1 tablet (40 mg) by mouth daily 100 tablet 3     bacitracin ointment Apply topically 2 times daily APPLY TO LEFT LEG TWO TIMES PER DAY 28 g 0     Blood Pressure Monitor KIT 1 Units daily (Patient not taking: Reported on 8/2/2018) 1 kit 0     Cholecalciferol (VITAMIN D-3 PO) Take 1 tablet by mouth 2 times daily       ciclopirox (LOPROX) 0.77 % cream Apply topically 2 times daily To feet and toenails. 90 g 6     cyanocobalamin (VITAMIN  B-12) 1000 MCG tablet Take 2 tablets (2,000 mcg) by mouth daily 180 tablet 3     doxazosin (CARDURA) 2 MG tablet Take 1/2 tab in the morning and 1/2 tab in the evening 90 tablet 3     fluticasone (FLONASE) 50 MCG/ACT nasal spray Spray 2 sprays into both nostrils daily (Patient not taking: Reported on 5/29/2018) 3 Package 0     ketoconazole (NIZORAL) 2 % cream Apply topically daily On hold 60 g 3     loratadine (CLARITIN) 10 MG tablet Take 10 mg by mouth as needed        metoprolol tartrate (LOPRESSOR) 25 MG tablet Take 1 tablet (25 mg) by mouth 2 times daily 180 tablet 3     mirtazapine (REMERON) 15 MG tablet Take 15 mg by mouth At Bedtime.       Multiple Vitamins-Minerals (CENTRUM SILVER) per tablet Take 1 tablet by mouth daily. AM        order for DME 20-30 mm Hg knee length compression stockings.  Measure and fit.  Style and color per patient preference.  Pat Hsu per  "patient need. 1 Units 0     sertraline (ZOLOFT) 100 MG tablet Take 100 mg by mouth every evening.       triamcinolone (KENALOG) 0.1 % cream Apply topically 2 times daily For up to two weeks in a row (Patient not taking: Reported on 7/3/2018) 80 g 3     warfarin (COUMADIN) 5 MG tablet Take 1 tablet by mouth daily or as directed by the Coumadin clinic. 90 tablet 3     warfarin (COUMADIN) 5 MG tablet 7.5 mg on Wed; 5 mg all other days  or as instructed by coumadin clinic. (Patient taking differently: 5 mg all days  or as instructed by coumadin clinic.) 35 tablet 5          Allergies   Allergen Reactions     Latex Rash     Rash         Exam: alert, appears slim. Blood pressure 112/61, pulse 71, temperature 97.1  F (36.2  C), temperature source Oral, resp. rate 14, height 1.727 m (5' 7.99\"), weight 64.7 kg (142 lb 11.2 oz), SpO2 100 %.  Wt Readings from Last 4 Encounters:   10/02/18 64.7 kg (142 lb 11.2 oz)   08/06/18 66.9 kg (147 lb 6.4 oz)   08/02/18 64.9 kg (143 lb 1.6 oz)   07/03/18 65.3 kg (144 lb)     Oropharynx is moist, no focal lesion. No icterus. Neck supple and without adenopathy. Lungs:CTA. Heart: irregular rhythm, no murmur or rub. Abdomen: soft, nontender, BS active. No organomegaly. Extremities: wearing a ankle brace and compression stocking on the LLE    Labs: Results for CARTER LEROY AGATHA (MRN [id number]) as of 10/2/2018 07:44   Ref. Range 10/1/2018 10:02   Sodium Latest Ref Range: 133 - 144 mmol/L 140   Potassium Latest Ref Range: 3.4 - 5.3 mmol/L 4.4   Chloride Latest Ref Range: 94 - 109 mmol/L 108   Carbon Dioxide Latest Ref Range: 20 - 32 mmol/L 24   Urea Nitrogen Latest Ref Range: 7 - 30 mg/dL 54 (H)   Creatinine Latest Ref Range: 0.66 - 1.25 mg/dL 1.62 (H)   GFR Estimate Latest Ref Range: >60 mL/min/1.7m2 41 (L)   GFR Estimate If Black Latest Ref Range: >60 mL/min/1.7m2 49 (L)   Calcium Latest Ref Range: 8.5 - 10.1 mg/dL 9.3   Anion Gap Latest Ref Range: 3 - 14 mmol/L 8   Albumin Latest Ref Range: " 3.4 - 5.0 g/dL 3.7   Protein Total Latest Ref Range: 6.8 - 8.8 g/dL 7.9   Bilirubin Total Latest Ref Range: 0.2 - 1.3 mg/dL 0.4   Alkaline Phosphatase Latest Ref Range: 40 - 150 U/L 109   ALT Latest Ref Range: 0 - 70 U/L 22   AST Latest Ref Range: 0 - 45 U/L 20   Glucose Latest Ref Range: 70 - 99 mg/dL 94   WBC Latest Ref Range: 4.0 - 11.0 10e9/L 7.8   Hemoglobin Latest Ref Range: 13.3 - 17.7 g/dL 10.5 (L)   Hematocrit Latest Ref Range: 40.0 - 53.0 % 32.1 (L)   Platelet Count Latest Ref Range: 150 - 450 10e9/L 218   RBC Count Latest Ref Range: 4.4 - 5.9 10e12/L 3.46 (L)   MCV Latest Ref Range: 78 - 100 fl 93   MCH Latest Ref Range: 26.5 - 33.0 pg 30.3   MCHC Latest Ref Range: 31.5 - 36.5 g/dL 32.7   RDW Latest Ref Range: 10.0 - 15.0 % 14.7   Diff Method Unknown Automated Method   % Neutrophils Latest Units: % 71.6   % Lymphocytes Latest Units: % 13.4   % Monocytes Latest Units: % 12.2   % Eosinophils Latest Units: % 2.1   % Basophils Latest Units: % 0.4   % Immature Granulocytes Latest Units: % 0.3   Nucleated RBCs Latest Ref Range: 0 /100 0   Absolute Neutrophil Latest Ref Range: 1.6 - 8.3 10e9/L 5.6   Absolute Lymphocytes Latest Ref Range: 0.8 - 5.3 10e9/L 1.0   Absolute Monocytes Latest Ref Range: 0.0 - 1.3 10e9/L 1.0   Absolute Eosinophils Latest Ref Range: 0.0 - 0.7 10e9/L 0.2   Absolute Basophils Latest Ref Range: 0.0 - 0.2 10e9/L 0.0   Abs Immature Granulocytes Latest Ref Range: 0 - 0.4 10e9/L 0.0   Absolute Nucleated RBC Unknown 0.0   INR Latest Ref Range: 0.86 - 1.14  2.99 (H)   CEA Latest Ref Range: 0 - 2.5 ug/L 5.2 (H)   Results for LEROY MACHADO (MRN [id number]) as of 10/2/2018 07:44   Ref. Range 9/26/2016 17:08 9/26/2017 13:33 11/27/2017 17:45 3/30/2018 11:29 10/1/2018 10:02   CEA Latest Ref Range: 0 - 2.5 ug/L 3.4 (H) 4.0 (H) 3.6 (H) 3.8 (H) 5.2 (H)       Imaging: EXAMINATION: CT CHEST/ABDOMEN/PELVIS W CONTRAST, 10/1/2018 10:48 AM     TECHNIQUE:  Helical CT images from the thoracic inlet through  the  symphysis pubis were obtained with contrast. Contrast dose: Isovue 370  91mls     COMPARISON: 3/30/2018, 9/26/2017, 3/31/2017, 8/14/2015     HISTORY: Malignant neoplasm of colon, unspecified part of colon (H)     FINDINGS:     Chest: The central tracheobronchial tree is patent. Nonenlarged heart.  Atherosclerotic calcification in the aortic arch, great vessels and  coronary arteries. Left chest implantable cardiac defibrillator. No  lymphadenopathy in the chest. Unremarkable thyroid. Biapical scarring.  No pneumothorax or pleural effusion. Left basilar  fibroatelectasis/scar. Few scattered tiny pulmonary nodules and areas  of mucus plugging are not significantly, changed for example (series  4, image 42) in the right upper lobe.     Abdomen and pelvis: No suspicious liver lesion. The gallbladder,  pancreas, and appendix are unremarkable. Unchanged coarse  calcification in the spleen. Left renal cyst. Bilateral renal  hypodensities are too small to characterize with CT. Punctate calyceal  tip stone in the left kidney (series 3, image 339). Thickening of the  left adrenal gland is not significantly changed over multiple prior  exams.  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 Aortobiiliac  atherosclerotic calcification. 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 No dilated  bowel. Surgical changes of partial colectomy in the left lower  quadrant. Enlarged prostate with calcifications. No lymphadenopathy in  the abdomen or pelvis. Small to moderate hiatal hernia.     Bones and soft tissues: No suspicious osseous abnormality. Multilevel  degenerative changes in the spine. Sternotomy.         IMPRESSION:  1.  Postoperative changes of partial colectomy for colon cancer. No  evidence of metastatic disease in the chest, abdomen, or pelvis.  2.  Small to moderate hiatal hernia.  3.  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   Additional incidental  and chronic findings as above.     KARLA BOGGS MD    Impression/plan:   1. Resected stage II colon cancer, s/p resection in April 2014.   -has a rising CEA and progressive weight loss, but no findings of recurrence on CT imaging. Is working with his primary on the weight loss concerns. Encouraged him to increase the protein supplements if not eating regular meals  -recommended f/u CEA in 3 months, if continuing to rise, will obtain CT CAP at that time. If stable or declining will repeat CEA and CT in 6 months. Will be approaching 5 years from resection at that time  -due for a colonoscopy. Sent message to colorectal surgery about this.       colonoscopy    Again, thank you for allowing me to participate in the care of your patient.      Sincerely,    MARIA LUISA Montes CNP

## 2018-10-02 NOTE — NURSING NOTE
"Oncology Rooming Note    October 2, 2018 11:05 AM   Noe Florence is a 83 year old male who presents for:    No chief complaint on file.    Initial Vitals: /61 (BP Location: Right arm, Patient Position: Chair, Cuff Size: Adult Regular)  Pulse 71  Temp 97.1  F (36.2  C) (Oral)  Resp 14  Ht 1.727 m (5' 7.99\")  Wt 64.7 kg (142 lb 11.2 oz)  SpO2 100%  BMI 21.7 kg/m2 Estimated body mass index is 21.7 kg/(m^2) as calculated from the following:    Height as of this encounter: 1.727 m (5' 7.99\").    Weight as of this encounter: 64.7 kg (142 lb 11.2 oz). Body surface area is 1.76 meters squared.  No Pain (0) Comment: Data Unavailable   No LMP for male patient.  Allergies reviewed: Yes  Medications reviewed: Yes    Medications: Medication refills not needed today.  Pharmacy name entered into [employer]: [facility] [address] [place] [address] [address]    Clinical concerns: No new concerns. Mundo was notified.    10 minutes for nursing intake (face to face time)     Alexis Brody LPN              "

## 2018-10-25 ENCOUNTER — ANTICOAGULATION THERAPY VISIT (OUTPATIENT)
Dept: ANTICOAGULATION | Facility: CLINIC | Age: 83
End: 2018-10-25

## 2018-10-25 DIAGNOSIS — Z79.01 LONG TERM CURRENT USE OF ANTICOAGULANT THERAPY: ICD-10-CM

## 2018-10-25 DIAGNOSIS — I48.0 PAROXYSMAL ATRIAL FIBRILLATION (H): ICD-10-CM

## 2018-10-25 LAB — INR PPP: 2.43 (ref 0.86–1.14)

## 2018-10-25 NOTE — PROGRESS NOTES
ANTICOAGULATION FOLLOW-UP CLINIC VISIT    Patient Name:  Noe Florence  Date:  10/25/2018  Contact Type:  Telephone    SUBJECTIVE:        OBJECTIVE    INR   Date Value Ref Range Status   10/25/2018 2.43 (H) 0.86 - 1.14 Final       ASSESSMENT / PLAN  No question data found.  Anticoagulation Summary as of 10/25/2018     INR goal 2.0-3.0   Today's INR 2.43   Warfarin maintenance plan 5 mg (5 mg x 1) every day   Full warfarin instructions 5 mg every day   Weekly warfarin total 35 mg   No change documented Roya Manning RN   Plan last modified Nguyen Zuniga RN (12/21/2017)   Next INR check 11/29/2018   Priority INR   Target end date Indefinite    Indications   Atrial fibrillation (H) [I48.91] [I48.91]  Long-term (current) use of anticoagulants [Z79.01] [Z79.01]         Anticoagulation Episode Summary     INR check location     Preferred lab     Send INR reminders to [facility]    Comments Patient contact number: [phone]   Can leave results with Rica (friend)      Anticoagulation Care Providers     Provider Role Specialty Phone number    Deedee Baird MD Responsible Cardiology [phone]            See the Encounter Report to view Anticoagulation Flowsheet and Dosing Calendar (Go to Encounters tab in chart review, and find the Anticoagulation Therapy Visit)    Left message for patient with results and dosing recommendations. Asked patient to call back to report any missed doses, falls, signs and symptoms of bleeding or clotting, any changes in health, medication, or diet. Asked patient to call back with any questions or concerns.     Roya Manning RN        Addendum 11/2/18    Agree, he does not need bridging   JL            Previous Messages       ----- Message -----      From: Roya Manning RN      Sent: 11/1/2018  12:15 PM        To: Roberto Sarmiento MD     Patient is scheduled for a colonoscopy on 11/7.  Patient has not been instructed to hold his warfarin. Is it okay to  hold the warfarin X 5 days.  He reports never using Lovenox in the past.   Thanks so much,   Roya (coumadin clinic)         Message is left for patient to start holding warfarin today.  Requested that patient call back to verify that he received message. DANA

## 2018-10-25 NOTE — MR AVS SNAPSHOT
Noe Florence   10/25/2018   Anticoagulation Therapy Visit    Description:  83 year old male   Provider:  Roya Manning, RN   Department:  [facility]           INR as of 10/25/2018     Today's INR 2.43      Anticoagulation Summary as of 10/25/2018     INR goal 2.0-3.0   Today's INR 2.43   Full warfarin instructions 5 mg every day   Next INR check 11/29/2018    Indications   Atrial fibrillation (H) [I48.91] [I48.91]  Long-term (current) use of anticoagulants [Z79.01] [Z79.01]         October 2018 Details    Sun Mon Tue Wed Thu Fri Sat      1               2               3               4               5               6                 7               8               9               10               11               12               13                 14               15               16               17               18               19               20                 21               22               23               24               25      5 mg   See details      26      5 mg         27      5 mg           28      5 mg         29      5 mg         30      5 mg         31      5 mg             Date Details   10/25 This INR check               How to take your warfarin dose     To take:  5 mg Take 1 of the 5 mg tablets.           November 2018 Details    Sun Mon Tue Wed Thu Fri Sat         1      5 mg         2      5 mg         3      5 mg           4      5 mg         5      5 mg         6      5 mg         7      5 mg         8      5 mg         9      5 mg         10      5 mg           11      5 mg         12      5 mg         13      5 mg         14      5 mg         15      5 mg         16      5 mg         17      5 mg           18      5 mg         19      5 mg         20      5 mg         21      5 mg         22      5 mg         23      5 mg         24      5 mg           25      5 mg         26      5 mg         27      5 mg         28      5 mg         29            30                  Date Details   No additional details    Date of next INR:  11/29/2018         How to take your warfarin dose     To take:  5 mg Take 1 of the 5 mg tablets.

## 2018-10-31 ENCOUNTER — TELEPHONE (OUTPATIENT)
Dept: GASTROENTEROLOGY | Facility: CLINIC | Age: 83
End: 2018-10-31

## 2018-10-31 DIAGNOSIS — Z12.11 SPECIAL SCREENING FOR MALIGNANT NEOPLASMS, COLON: Primary | ICD-10-CM

## 2018-10-31 NOTE — TELEPHONE ENCOUNTER
Patient scheduled for colonoscopy     Indication for procedure. Hx colon cancer    Referring Provider. dr rah Marroquin     ? no    Arrival time verified? 750am , gold waiting for INR    Facility location verified? [address], [place], [address]     Instructions given regarding prep and procedure    Prep Type     Are you taking any anticoagulants or blood thinners? Coumadin - will hold for 5 days and call PCP     Instructions given? yes    Electronic implanted devices? Pacemaker/defib     Pre procedure teaching completed? Yes    Transportation from procedure? [place]      H&P / Pre op physical completed? NA

## 2018-11-07 ENCOUNTER — SURGERY (OUTPATIENT)
Age: 83
End: 2018-11-07

## 2018-11-07 ENCOUNTER — ANTICOAGULATION THERAPY VISIT (OUTPATIENT)
Dept: ANTICOAGULATION | Facility: CLINIC | Age: 83
End: 2018-11-07

## 2018-11-07 ENCOUNTER — HOSPITAL ENCOUNTER (OUTPATIENT)
Facility: CLINIC | Age: 83
Discharge: HOME | End: 2018-11-07
Attending: INTERNAL MEDICINE | Admitting: INTERNAL MEDICINE
Payer: COMMERCIAL

## 2018-11-07 ENCOUNTER — TELEPHONE (OUTPATIENT)
Dept: GASTROENTEROLOGY | Facility: CLINIC | Age: 83
End: 2018-11-07

## 2018-11-07 VITALS
BODY MASS INDEX: 21.22 KG/M2 | RESPIRATION RATE: 18 BRPM | DIASTOLIC BLOOD PRESSURE: 61 MMHG | SYSTOLIC BLOOD PRESSURE: 122 MMHG | HEIGHT: 68 IN | OXYGEN SATURATION: 98 % | WEIGHT: 140 LBS

## 2018-11-07 DIAGNOSIS — I48.0 PAROXYSMAL ATRIAL FIBRILLATION (H): ICD-10-CM

## 2018-11-07 DIAGNOSIS — C18.9 MALIGNANT NEOPLASM OF COLON (H): Primary | ICD-10-CM

## 2018-11-07 DIAGNOSIS — C18.3 MALIGNANT NEOPLASM OF HEPATIC FLEXURE (H): Primary | ICD-10-CM

## 2018-11-07 DIAGNOSIS — Z79.01 LONG TERM CURRENT USE OF ANTICOAGULANT THERAPY: ICD-10-CM

## 2018-11-07 LAB
COLONOSCOPY: NORMAL
INR PPP: 1.21 (ref 0.86–1.14)

## 2018-11-07 PROCEDURE — 45380 COLONOSCOPY AND BIOPSY: CPT | Performed by: INTERNAL MEDICINE

## 2018-11-07 PROCEDURE — 36415 COLL VENOUS BLD VENIPUNCTURE: CPT | Performed by: INTERNAL MEDICINE

## 2018-11-07 PROCEDURE — 99153 MOD SED SAME PHYS/QHP EA: CPT | Performed by: INTERNAL MEDICINE

## 2018-11-07 PROCEDURE — 25000128 H RX IP 250 OP 636: Performed by: INTERNAL MEDICINE

## 2018-11-07 PROCEDURE — 45381 COLONOSCOPY SUBMUCOUS NJX: CPT | Performed by: INTERNAL MEDICINE

## 2018-11-07 PROCEDURE — 00000159 ZZHCL STATISTIC H-SEND OUTS PREP: Performed by: INTERNAL MEDICINE

## 2018-11-07 PROCEDURE — 88342 IMHCHEM/IMCYTCHM 1ST ANTB: CPT | Performed by: INTERNAL MEDICINE

## 2018-11-07 PROCEDURE — 85610 PROTHROMBIN TIME: CPT | Performed by: INTERNAL MEDICINE

## 2018-11-07 PROCEDURE — 88305 TISSUE EXAM BY PATHOLOGIST: CPT | Performed by: INTERNAL MEDICINE

## 2018-11-07 PROCEDURE — 81301 MICROSATELLITE INSTABILITY: CPT | Performed by: INTERNAL MEDICINE

## 2018-11-07 PROCEDURE — G0500 MOD SEDAT ENDO SERVICE >5YRS: HCPCS | Performed by: INTERNAL MEDICINE

## 2018-11-07 PROCEDURE — 88341 IMHCHEM/IMCYTCHM EA ADD ANTB: CPT | Performed by: INTERNAL MEDICINE

## 2018-11-07 PROCEDURE — 25000132 ZZH RX MED GY IP 250 OP 250 PS 637: Performed by: INTERNAL MEDICINE

## 2018-11-07 RX ORDER — ONDANSETRON 2 MG/ML
4 INJECTION INTRAMUSCULAR; INTRAVENOUS
Status: DISCONTINUED | OUTPATIENT
Start: 2018-11-07 | End: 2018-11-07 | Stop reason: HOSPADM

## 2018-11-07 RX ORDER — FENTANYL CITRATE 50 UG/ML
INJECTION, SOLUTION INTRAMUSCULAR; INTRAVENOUS PRN
Status: DISCONTINUED | OUTPATIENT
Start: 2018-11-07 | End: 2018-11-07 | Stop reason: HOSPADM

## 2018-11-07 RX ORDER — SIMETHICONE
LIQUID (ML) MISCELLANEOUS PRN
Status: DISCONTINUED | OUTPATIENT
Start: 2018-11-07 | End: 2018-11-07 | Stop reason: HOSPADM

## 2018-11-07 RX ORDER — LIDOCAINE 40 MG/G
CREAM TOPICAL
Status: DISCONTINUED | OUTPATIENT
Start: 2018-11-07 | End: 2018-11-07 | Stop reason: HOSPADM

## 2018-11-07 RX ORDER — NALOXONE HYDROCHLORIDE 0.4 MG/ML
.1-.4 INJECTION, SOLUTION INTRAMUSCULAR; INTRAVENOUS; SUBCUTANEOUS
Status: CANCELLED | OUTPATIENT
Start: 2018-11-07 | End: 2018-11-08

## 2018-11-07 RX ORDER — ONDANSETRON 4 MG/1
4 TABLET, ORALLY DISINTEGRATING ORAL EVERY 6 HOURS PRN
Status: CANCELLED | OUTPATIENT
Start: 2018-11-07

## 2018-11-07 RX ORDER — ONDANSETRON 2 MG/ML
4 INJECTION INTRAMUSCULAR; INTRAVENOUS EVERY 6 HOURS PRN
Status: CANCELLED | OUTPATIENT
Start: 2018-11-07

## 2018-11-07 RX ORDER — FLUMAZENIL 0.1 MG/ML
0.2 INJECTION, SOLUTION INTRAVENOUS
Status: CANCELLED | OUTPATIENT
Start: 2018-11-07 | End: 2018-11-07

## 2018-11-07 RX ADMIN — MIDAZOLAM 1 MG: 1 INJECTION INTRAMUSCULAR; INTRAVENOUS at 09:19

## 2018-11-07 RX ADMIN — Medication 2 ML: at 09:29

## 2018-11-07 RX ADMIN — FENTANYL CITRATE 25 MCG: 50 INJECTION, SOLUTION INTRAMUSCULAR; INTRAVENOUS at 09:19

## 2018-11-07 RX ADMIN — FENTANYL CITRATE 25 MCG: 50 INJECTION, SOLUTION INTRAMUSCULAR; INTRAVENOUS at 09:40

## 2018-11-07 NOTE — MR AVS SNAPSHOT
Noe Florence   11/7/2018   Anticoagulation Therapy Visit    Description:  83 year old male   Provider:  Sary Parisi, RN   [facility]           INR as of 11/7/2018     Today's INR 1.21!      Anticoagulation Summary as of 11/7/2018     INR goal 2.0-3.0   Today's INR 1.21!   Full warfarin instructions 5 mg every day   Next INR check 11/12/2018    Indications   Atrial fibrillation (H) [I48.91] [I48.91]  Long-term (current) use of anticoagulants [Z79.01] [Z79.01]         November 2018 Details    Sun Mon Tue Wed Thu Fri Sat         1               2               3                 4               5               6               7      5 mg   See details      8      5 mg         9      5 mg         10      5 mg           11      5 mg         12            13               14               15               16               17                 18               19               20               21               22               23               24                 25               26               27               28               29               30                 Date Details   11/07 This INR check       Date of next INR:  11/12/2018         How to take your warfarin dose     To take:  5 mg Take 1 of the 5 mg tablets.

## 2018-11-07 NOTE — TELEPHONE ENCOUNTER
Spoke with Dr. Guzmán, she would like the patient to have a CT CAP and labs before the appointment on 11/14. Orders have been placed and message routed to have patient scheduled for testing

## 2018-11-07 NOTE — PROGRESS NOTES
ANTICOAGULATION FOLLOW-UP CLINIC VISIT    Patient Name:  Noe Florence  Date:  11/7/2018  Contact Type:  Telephone    SUBJECTIVE:     Patient Findings     Positives Intentional hold of therapy    Comments Pt had a colonoscopy today.  Writer speaks with Dr. Esteban who did the procedure & orders rec'd to resume coumadin today unless there is active bleeding.  Pt reports no bleeding post procedure.           OBJECTIVE    INR   Date Value Ref Range Status   11/07/2018 1.21 (H) 0.86 - 1.14 Final       ASSESSMENT / PLAN  INR assessment SUB    Recheck INR In: 5 DAYS    INR Location Clinic      Anticoagulation Summary as of 11/7/2018     INR goal 2.0-3.0   Today's INR 1.21!   Warfarin maintenance plan 5 mg (5 mg x 1) every day   Full warfarin instructions 5 mg every day   Weekly warfarin total 35 mg   Plan last modified Nguyen Zuniag RN (12/21/2017)   Next INR check 11/12/2018   Priority INR   Target end date Indefinite    Indications   Atrial fibrillation (H) [I48.91] [I48.91]  Long-term (current) use of anticoagulants [Z79.01] [Z79.01]         Anticoagulation Episode Summary     INR check location     Preferred lab     Send INR reminders to [facility]    Comments Patient contact number: [phone]   Can leave results with Rica (friend)      Anticoagulation Care Providers     Provider Role Specialty Phone number    Deedee Baird MD Responsible Cardiology [phone]            See the Encounter Report to view Anticoagulation Flowsheet and Dosing Calendar (Go to Encounters tab in chart review, and find the Anticoagulation Therapy Visit)    Spoke with the pt after his procedure.    Sayr Parisi RN

## 2018-11-07 NOTE — IP AVS SNAPSHOT
[facility], [place], Endoscopy    [address] [address]    Phone:  [phone]                                       After Visit Summary   11/7/2018    Noe Florence    MRN: [id number]           After Visit Summary Signature Page     I have received my discharge instructions, and my questions have been answered. I have discussed any challenges I see with this plan with the nurse or doctor.    ..........................................................................................................................................  Patient/Patient Representative Signature      ..........................................................................................................................................  Patient Representative Print Name and Relationship to Patient    ..................................................               ................................................  Date                                   Time    ..........................................................................................................................................  Reviewed by Signature/Title    ...................................................              ..............................................  Date                                               Time          22EPIC Rev 08/18

## 2018-11-07 NOTE — DISCHARGE INSTRUCTIONS
Discharge Instructions after Colonoscopy  or Sigmoidoscopy    Today you had a _X___ Colonoscopy ____ Sigmoidoscopy    Activity and Diet  You were given medicine for pain. You may be dizzy or sleepy.  For 24 hours:    Do not drive or use heavy equipment.    Do not make important decisions.    Do not drink any alcohol.  You may return to your normal diet and medicines.    Discomfort    Air was placed in your colon during the exam in order to see it. Walking helps to pass the air.    You may take Tylenol (acetaminophen) for pain unless your doctor has told you not to.  Do not take aspirin or ibuprofen (Advil, Motrin, or other anti-inflammatory  drugs) for _____ days.    Follow-up  _X___ We took small tissue samples or polyps to study. Your doctor will call you with the results  within two weeks.    When to call:    Call right away if you have:    Unusual pain in belly or chest pain not relieved with passing air.    More than 1 to 2 Tablespoons of bleeding from your rectum.    Fever above 100.6  F (37.5  C).    If you have severe pain, bleeding, or shortness of breath, go to an emergency room.    If you have questions, call:  Monday to Friday, 7 a.m. to 4:30 p.m.  Endoscopy: [phone] (We may have to call you back)    After hours  Hospital: [phone] (Ask for the GI fellow on call)

## 2018-11-07 NOTE — IP AVS SNAPSHOT
MRN:[id number]                      After Visit Summary   11/7/2018    Noe Florence    MRN: [id number]           Thank you!     Thank you for choosing [facility] for your care. Our goal is always to provide you with excellent care. Hearing back from our patients is one way we can continue to improve our services. Please take a few minutes to complete the written survey that you may receive in the mail after you visit with us. Thank you!        Patient Information     Date Of Birth          1935        About your hospital stay     You were admitted on:  November 7, 2018 You last received care in the:  [facility], Endoscopy    You were discharged on:  November 7, 2018       Who to Call     For medical emergencies, please call 911.  For non-urgent questions about your medical care, please call your primary care provider or clinic, [phone]  For questions related to your surgery, please call your surgery clinic        Attending Provider     Provider Roberto Hobbs MD Gastroenterology       Primary Care Provider Office Phone # Fax #    Roberto Sarmiento [phone]      Your next 10 appointments already scheduled     Nov 20, 2018 11:00 AM CST   US CAROTID BILATERAL with UCUSV2   [facility] US ([facility])    [address]  [facility] [address] [phone]           How do I prepare for my exam? (Food and drink instructions) No Food and Drink Restrictions.  How do I prepare for my exam? (Other instructions) You do not need to do anything special to prepare for your exam.  What should I wear: Wear comfortable clothes.  How long does the exam take: Most ultrasounds take 30 to 60 minutes.  What should I bring: Bring a list of your medicines, including vitamins, minerals and over-the-counter drugs. It is safest to leave personal items at home.  Do I need a :  No  is needed.   What do I need to tell my doctor: Tell your doctor about any allergies you may have.  What should I do after the exam: No restrictions, You may resume normal activities.  What is this test: An ultrasound uses sound waves to make pictures of the body. Sound waves do not cause pain. The only discomfort may be the pressure of the wand against your skin or full bladder.  Who should I call with questions: If you have any questions, please call the Imaging Department where you will have your exam. Directions, parking instructions, and other information is available on our website, [email or web address].            Nov 27, 2018  1:30 PM CST   (Arrive by 1:15 PM)   Return Vascular Visit with Frida Desai MD   [facility] ([facility])    [address] [place] [address]  [address]  [facility] [address] [phone]            Dec 03, 2018 10:30 AM CST   (Arrive by 10:15 AM)   Implanted Defibulator with  Cv Device [address] [place] [address] ([facility])    [address] [place] [address]  3rd Floor  [id number]               Dec 03, 2018  1:30 PM CST   (Arrive by 1:15 PM)   RETURN ARRHYTHMIA with Deedee Baird MD   [facility] ([facility])    [address] [place] [address]  [address]  [facility] [address] [phone]            Jan 07, 2019 10:00 AM CST   (Arrive by 9:45 AM)   Return Visit with Roberto Sarmiento MD   [facility] ([facility])    [address] [place] [address]  4th Floor  [facility] [address] [phone]            Jan 08, 2019 11:00 AM CST   Masonic Lab Draw with  MASONIC LAB DRAW   [facility] Draw ([facility])    [address] [place] [address]  [address]  [facility] [address] [phone]            Jan 08, 2019 12:30 PM CST   Return Neuropathy Visit with Jase Duarte MD   [facility] NEUROSPECIALT[facility] ([facility])    [address]  [address]  [facility] [address]   [phone]            Apr 02, 2019 10:15 AM CDT   LAB with  LAB   [facility] ([facility])    [address]  [facility] [facility] [id number] [phone]           Please do not eat 10-12 hours before your appointment if you are coming in fasting for labs on lipids, cholesterol, or glucose (sugar). This does not apply to pregnant women. Water, hot tea and black coffee (with nothing added) are okay. Do not drink other fluids, diet soda or chew gum.            Apr 02, 2019 11:00 AM CDT   CT CHEST/ABDOMEN/PELVIS W CONTRAST with UCCT2   [facility] CT ([facility] [place])    [address]  [facility] [address] [phone]           How do I prepare for my exam? (Food and drink instructions) To prepare: Do not eat or drink for 2 hours before your exam. If you need to take medicine, you may take it with small sips of water. (We may ask you to take liquid medicine as well.)  How do I prepare for my exam? (Other instructions) Please arrive 30 minutes early for your CT.  Once in the department you might be asked to drink water 15-20 minutes prior to your exam.  If indicated you may be asked to drink an oral contrast in advance of your CT.  If this is the case, the imaging team will let you know or be in contact with you prior to your appointment  Patients over 70 or patients with diabetes or kidney problems: If you haven t had a blood test (creatinine test) within the last 30 days, the Cardiologist/Radiologist may require you to get this test prior to your exam.  If you have diabetes:  Continue to take your metformin medication on the day of your exam  What should I wear: Please wear loose clothing, such as a sweat suit or jogging clothes. Avoid snaps, zippers and other metal. We may ask you to undress and put on a hospital gown.  How long does the exam take: Most scans take less than 20  minutes.  What should I bring: Please bring any scans or X-rays taken at other hospitals, if similar tests were done. Also bring a list of your medicines, including vitamins, minerals and over-the-counter drugs. It is safest to leave personal items at home.  Do I need a : No  is needed.  What do I need to tell my doctor? Be sure to tell your doctor: * If you have any allergies. * If there s any chance you are pregnant. * If you are breastfeeding.  What should I do after the exam: No restrictions, You may resume normal activities.  What is this test: A CT (computed tomography) scan is a series of pictures that allows us to look inside your body. The scanner creates images of the body in cross sections, much like slices of bread. This helps us see any problems more clearly. You may receive contrast (X-ray dye) before or during your scan. You will be asked to drink the contrast.  Who should I call with questions: If you have any questions, please call the Imaging Department where you will have your exam. Directions, parking instructions, and other information is available on our website, [email or web address].            Apr 08, 2019 10:45 AM CDT   (Arrive by 10:30 AM)   Return Visit with Francisco Gilliam MD   [facility] ([facility])    [address] [place] [address]  [address] [place] [address] [phone]              Further instructions from your care team       Discharge Instructions after Colonoscopy  or Sigmoidoscopy    Today you had a _X___ Colonoscopy ____ Sigmoidoscopy    Activity and Diet  You were given medicine for pain. You may be dizzy or sleepy.  For 24 hours:    Do not drive or use heavy equipment.    Do not make important decisions.    Do not drink any alcohol.  You may return to your normal diet and medicines.    Discomfort    Air was placed in your colon during the exam in order to see it. Walking helps to pass the air.    You may take  "Tylenol (acetaminophen) for pain unless your doctor has told you not to.  Do not take aspirin or ibuprofen (Advil, Motrin, or other anti-inflammatory  drugs) for _____ days.    Follow-up  _X___ We took small tissue samples or polyps to study. Your doctor will call you with the results  within two weeks.    When to call:    Call right away if you have:    Unusual pain in belly or chest pain not relieved with passing air.    More than 1 to 2 Tablespoons of bleeding from your rectum.    Fever above 100.6  F (37.5  C).    If you have severe pain, bleeding, or shortness of breath, go to an emergency room.    If you have questions, call:  Monday to Friday, 7 a.m. to 4:30 p.m.  Endoscopy: [phone] (We may have to call you back)    After hours  Hospital: [phone] (Ask for the GI fellow on call)    Pending Results     No orders found from 11/5/2018 to 11/8/2018.            Admission Information     Date & Time Provider Department Dept. Phone    11/7/2018 Roberto Esteban MD [facility] [place], Endoscopy [phone]      Your Vitals Were     Blood Pressure Respirations Height Weight Pulse Oximetry BMI (Body Mass Index)    131/66 19 1.727 m (5' 8\") 63.5 kg (140 lb) 98% 21.29 kg/m2      MyChart Information     [employer] gives you secure access to your electronic health record. If you see a primary care provider, you can also send messages to your care team and make appointments. If you have questions, please call your primary care clinic.  If you do not have a primary care provider, please call [phone] and they will assist you.        Care EveryWhere ID     This is your Care EveryWhere ID. This could be used by other organizations to access your [place] medical records  [id number]        Equal Access to Services     LEE CASTILLO AH: Delilah Bains, jayashree harmon, gino holcomb, sil minor. So [facility] [phone].    ATENCIÓN: Si phong moura " disposición servicios gratuitos de asistencia lingüística. Lauren galicia [phone].    We comply with applicable federal civil rights laws and [place] laws. We do not discriminate on the basis of race, color, national origin, age, disability, sex, sexual orientation, or gender identity.               Review of your medicines      CONTINUE these medicines which may have CHANGED, or have new prescriptions. If we are uncertain of the size of tablets/capsules you have at home, strength may be listed as something that might have changed.        Dose / Directions    * warfarin 5 MG tablet   Commonly known as:  COUMADIN   This may have changed:  additional instructions   Used for:  Atrial flutter (H)        7.5 mg on Wed; 5 mg all other days  or as instructed by coumadin clinic.   Quantity:  35 tablet   Refills:  5       * warfarin 5 MG tablet   Commonly known as:  COUMADIN   This may have changed:  Another medication with the same name was changed. Make sure you understand how and when to take each.   Used for:  Paroxysmal atrial fibrillation (H), Long-term (current) use of anticoagulants        Take 1 tablet by mouth daily or as directed by the Coumadin clinic.   Quantity:  90 tablet   Refills:  3       * Notice:  This list has 2 medication(s) that are the same as other medications prescribed for you. Read the directions carefully, and ask your doctor or other care provider to review them with you.      CONTINUE these medicines which have NOT CHANGED        Dose / Directions    aspirin 81 MG tablet        Dose:  1 tablet   Take 1 tablet by mouth daily.   Refills:  0       atorvastatin 40 MG tablet   Commonly known as:  LIPITOR   Used for:  Hyperlipidemia, unspecified hyperlipidemia type        Dose:  40 mg   Take 1 tablet (40 mg) by mouth daily   Quantity:  100 tablet   Refills:  3       bacitracin ointment   Used for:  Left leg cellulitis        Apply topically 2 times daily APPLY TO LEFT LEG TWO TIMES PER DAY    Quantity:  28 g   Refills:  0       Blood Pressure Monitor Kit   Used for:  Hypertension        Dose:  1 Units   1 Units daily   Quantity:  1 kit   Refills:  0       CENTRUM SILVER per tablet        Dose:  1 tablet   Take 1 tablet by mouth daily. AM   Refills:  0       ciclopirox 0.77 % cream   Commonly known as:  LOPROX   Used for:  Dermatophytosis of nail, Tinea pedis of both feet        Apply topically 2 times daily To feet and toenails.   Quantity:  90 g   Refills:  6       cyanocobalamin 1000 MCG tablet   Commonly known as:  vitamin  B-12   Used for:  Anemia        Dose:  2000 mcg   Take 2 tablets (2,000 mcg) by mouth daily   Quantity:  180 tablet   Refills:  3       doxazosin 2 MG tablet   Commonly known as:  CARDURA   Used for:  Essential hypertension        Take 1/2 tab in the morning and 1/2 tab in the evening   Quantity:  90 tablet   Refills:  3       fluticasone 50 MCG/ACT spray   Commonly known as:  FLONASE   Used for:  Unspecified sinusitis (chronic)        Dose:  2 spray   Spray 2 sprays into both nostrils daily   Quantity:  3 Package   Refills:  0       ketoconazole 2 % cream   Commonly known as:  NIZORAL   Used for:  Tinea corporis        Apply topically daily On hold   Quantity:  60 g   Refills:  3       loratadine 10 MG tablet   Commonly known as:  CLARITIN        Dose:  10 mg   Take 10 mg by mouth as needed   Refills:  0       metoprolol tartrate 25 MG tablet   Commonly known as:  LOPRESSOR   Used for:  Coronary artery disease involving native heart without angina pectoris, unspecified vessel or lesion type        Dose:  25 mg   Take 1 tablet (25 mg) by mouth 2 times daily   Quantity:  180 tablet   Refills:  3       mirtazapine 15 MG tablet   Commonly known as:  REMERON        Dose:  15 mg   Take 15 mg by mouth At Bedtime.   Refills:  0       order for DME   Used for:  PVD (peripheral vascular disease) (H), Ulcer of left lower extremity, limited to breakdown of skin (H)        20-30 mm Hg knee  length compression stockings.  Measure and fit.  Style and color per patient preference.  Doff n Aracelis per patient need.   Quantity:  1 Units   Refills:  0       triamcinolone 0.1 % cream   Commonly known as:  KENALOG   Used for:  Atopic eczema        Apply topically 2 times daily For up to two weeks in a row   Quantity:  80 g   Refills:  3       VITAMIN D-3 PO        Dose:  1 tablet   Take 1 tablet by mouth 2 times daily   Refills:  0       ZOLOFT 100 MG tablet   Generic drug:  sertraline        Dose:  100 mg   Take 100 mg by mouth every evening.   Refills:  0                Protect others around you: Learn how to safely use, store and throw away your medicines at www.disposemymeds.org.             Medication List: This is a list of all your medications and when to take them. Check marks below indicate your daily home schedule. Keep this list as a reference.      Medications           Morning Afternoon Evening Bedtime As Needed    aspirin 81 MG tablet   Take 1 tablet by mouth daily.                                atorvastatin 40 MG tablet   Commonly known as:  LIPITOR   Take 1 tablet (40 mg) by mouth daily                                bacitracin ointment   Apply topically 2 times daily APPLY TO LEFT LEG TWO TIMES PER DAY                                Blood Pressure Monitor Kit   1 Units daily                                CENTRUM SILVER per tablet   Take 1 tablet by mouth daily. AM                                ciclopirox 0.77 % cream   Commonly known as:  LOPROX   Apply topically 2 times daily To feet and toenails.                                cyanocobalamin 1000 MCG tablet   Commonly known as:  vitamin  B-12   Take 2 tablets (2,000 mcg) by mouth daily                                doxazosin 2 MG tablet   Commonly known as:  CARDURA   Take 1/2 tab in the morning and 1/2 tab in the evening                                fluticasone 50 MCG/ACT spray   Commonly known as:  FLONASE   Spray 2 sprays into both  nostrils daily                                ketoconazole 2 % cream   Commonly known as:  NIZORAL   Apply topically daily On hold                                loratadine 10 MG tablet   Commonly known as:  CLARITIN   Take 10 mg by mouth as needed                                metoprolol tartrate 25 MG tablet   Commonly known as:  LOPRESSOR   Take 1 tablet (25 mg) by mouth 2 times daily                                mirtazapine 15 MG tablet   Commonly known as:  REMERON   Take 15 mg by mouth At Bedtime.                                order for DME   20-30 mm Hg knee length compression stockings.  Measure and fit.  Style and color per patient preference.  Doff n Aracelis per patient need.                                triamcinolone 0.1 % cream   Commonly known as:  KENALOG   Apply topically 2 times daily For up to two weeks in a row                                VITAMIN D-3 PO   Take 1 tablet by mouth 2 times daily                                * warfarin 5 MG tablet   Commonly known as:  COUMADIN   7.5 mg on Wed; 5 mg all other days  or as instructed by coumadin clinic.                                * warfarin 5 MG tablet   Commonly known as:  COUMADIN   Take 1 tablet by mouth daily or as directed by the Coumadin clinic.                                ZOLOFT 100 MG tablet   Take 100 mg by mouth every evening.   Generic drug:  sertraline                                * Notice:  This list has 2 medication(s) that are the same as other medications prescribed for you. Read the directions carefully, and ask your doctor or other care provider to review them with you.

## 2018-11-07 NOTE — TELEPHONE ENCOUNTER
See colonoscopy report from today.    Concern for malignancy discussed with pt and spouse today.    Discussed prelim findings with Angie Flower CNP, who stated her clinic will arrange for follow-up visit.    I recommended surgical consult, apparently with Dr. Marroquin, who is listed as referring MD for colonoscopy.    Shalini - please arrange for outpt consult with Dr. Cara GARRIDO.    He was paged but is currently in OR.    ALEYDA Esteban MD  Associate Professor of Medicine  Division of Gastroenterology, Hepatology and Nutrition  [facility]

## 2018-11-07 NOTE — TELEPHONE ENCOUNTER
I apologize for the confusion. Just spoke with Dr. Marroquin. Although he was listed as referring MD for this, pt has previously seen Dr. Guzmán.    Please arrange for consult with her instead.     ALEYDA Esteban MD  Associate Professor of Medicine  Division of Gastroenterology, Hepatology and Nutrition  [facility]

## 2018-11-07 NOTE — OR NURSING
Procedure: Colonoscopy with biopsies  Sedation: conscious sedation  Specimens: x 1, sent to lab.   O2: room air  Tolerated procedure: well  Pt to recovery area in stable condition accompanied by RN.   Other:  none    Jeny Becker RN

## 2018-11-08 ENCOUNTER — TELEPHONE (OUTPATIENT)
Dept: SURGERY | Facility: CLINIC | Age: 83
End: 2018-11-08

## 2018-11-08 NOTE — TELEPHONE ENCOUNTER
Called patient to follow up on scheduling.  Discussed referral for CT and Dr. Guzmán with patient's wife.  Patient is scheduled for CT CAP 11/13/18 at 4:00 pm with a 3:30 pm check-in.  Instructed patient to be NPO 2 hours prior to exam.  Patient's wife confirms appointment with Dr. Guzmán 11/14/18 at 3:30 pm, and has my direct number for additional questions or concerns.

## 2018-11-08 NOTE — TELEPHONE ENCOUNTER
Patient is being referred back to Dr. Guzmán for hepatic mass.  Per Dr. Guzmán's request, patient is scheduled for CT scan 11/9/18 at 4:30 pm.  In addition, patient is scheduled to see Dr. Guzmán 11/14/18 at 3:30 pm.  Attempted to leave message, however answering machine kept cutting me off.  Will continue to try and reach patient throughout the day.

## 2018-11-09 ENCOUNTER — ALLIED HEALTH/NURSE VISIT (OUTPATIENT)
Dept: INTERNAL MEDICINE | Facility: CLINIC | Age: 83
End: 2018-11-09
Payer: COMMERCIAL

## 2018-11-09 ENCOUNTER — TELEPHONE (OUTPATIENT)
Dept: GASTROENTEROLOGY | Facility: CLINIC | Age: 83
End: 2018-11-09

## 2018-11-09 DIAGNOSIS — Z23 NEED FOR PROPHYLACTIC VACCINATION AND INOCULATION AGAINST INFLUENZA: Primary | ICD-10-CM

## 2018-11-09 NOTE — TELEPHONE ENCOUNTER
Final biopsy confirmed colon adenocarcinoma.    Unable to reach pt by phone with multiple attempts. VM left for him to call back. Will continue attempts.    Confirmed in [facility] that referral to colorectal surgery has been arranged.    ALEYDA Esteban MD  Associate Professor of Medicine  Division of Gastroenterology, Hepatology and Nutrition  [facility]

## 2018-11-09 NOTE — NURSING NOTE
Noe Florence comes into clinic today at the request of Dr. Roberto Sarmiento Ordering Provider for flu vaccine.    The flu vaccine was given today and was tolerated well by the patient. Additional information can be found in the immunizations tab.    This service provided today was under the supervising provider of the day Dr. Lauri Francisco, who was available if needed.    Stu Bruno

## 2018-11-09 NOTE — NURSING NOTE
Noe Florence      1.  Has the patient received the information for the influenza vaccine? YES    2.  Does the patient have any of the following contraindications?     Allergy to eggs? No     Allergic reaction to previous influenza vaccines? No     Any other problems to previous influenza vaccines? No     Paralyzed by Guillain-[place] syndrome? No     Currently pregnant? NO     Current moderate or severe illness? No     Allergy to contact lens solution? No    3.  The vaccine has been administered in the usual fashion and the patient was instructed to wait 20 minutes before leaving the building in the event of an allergic reaction: YES    Recorded by Stu Bruno

## 2018-11-09 NOTE — MR AVS SNAPSHOT
After Visit Summary   11/9/2018    Noe Florence    MRN: [id number]           Patient Information     Date Of Birth          1935        Visit Information        Provider Department      11/9/2018 2:00 PM Nurse, [facility]        Today's Diagnoses     Need for prophylactic vaccination and inoculation against influenza    -  1       Follow-ups after your visit        Your next 10 appointments already scheduled     Nov 13, 2018  4:00 PM CST   CT CHEST ABDOMEN PELVIS W/O & W CONTRAST with UCCT1   [facility] CT ([facility])    [address] [address] [phone]           How do I prepare for my exam? (Food and drink instructions) To prepare: Do not eat or drink for 2 hours before your exam. If you need to take medicine, you may take it with small sips of water. (We may ask you to take liquid medicine as well.)  How do I prepare for my exam? (Other instructions) Please arrive 30 minutes early for your CT.  Once in the department you might be asked to drink water 15-20 minutes prior to your exam.  If indicated you may be asked to drink an oral contrast in advance of your CT.  If this is the case, the imaging team will let you know or be in contact with you prior to your appointment  Patients over 70 or patients with diabetes or kidney problems: If you haven t had a blood test (creatinine test) within the last 30 days, the Cardiologist/Radiologist may require you to get this test prior to your exam.  If you have diabetes:  Continue to take your metformin medication on the day of your exam  What should I wear: Please wear loose clothing, such as a sweat suit or jogging clothes. Avoid snaps, zippers and other metal. We may ask you to undress and put on a hospital gown.  How long does the exam take: Most scans take less than 20 minutes.  What should I bring: Please bring any scans or X-rays taken at other  hospitals, if similar tests were done. Also bring a list of your medicines, including vitamins, minerals and over-the-counter drugs. It is safest to leave personal items at home.  Do I need a : No  is needed.  What do I need to tell my doctor? Be sure to tell your doctor: * If you have any allergies. * If there s any chance you are pregnant. * If you are breastfeeding.  What should I do after the exam: No restrictions, You may resume normal activities.  What is this test: A CT (computed tomography) scan is a series of pictures that allows us to look inside your body. The scanner creates images of the body in cross sections, much like slices of bread. This helps us see any problems more clearly. You may receive contrast (X-ray dye) before or during your scan. You will be asked to drink the contrast.  Who should I call with questions: If you have any questions, please call the Imaging Department where you will have your exam. Directions, parking instructions, and other information is available on our website, [email or web address].            Nov 14, 2018  3:30 PM CST   (Arrive by 3:15 PM)   NEW CANCER VISIT with Chanelle Guzmán MD   [facility] ([facility])    [address] [place] [address]  4th Floor  [facility] [id number] [phone]            Nov 20, 2018 11:00 AM CST   US CAROTID BILATERAL with UCUSV2   [facility] US ([facility])    [address] [place] [address]  1st Floor  [facility] [id number] [phone]           How do I prepare for my exam? (Food and drink instructions) No Food and Drink Restrictions.  How do I prepare for my exam? (Other instructions) You do not need to do anything special to prepare for your exam.  What should I wear: Wear comfortable clothes.  How long does the exam take: Most ultrasounds take 30 to 60 minutes.  What should I bring: Bring a list of your medicines, including vitamins,  minerals and over-the-counter drugs. It is safest to leave personal items at home.  Do I need a :  No  is needed.  What do I need to tell my doctor: Tell your doctor about any allergies you may have.  What should I do after the exam: No restrictions, You may resume normal activities.  What is this test: An ultrasound uses sound waves to make pictures of the body. Sound waves do not cause pain. The only discomfort may be the pressure of the wand against your skin or full bladder.  Who should I call with questions: If you have any questions, please call the Imaging Department where you will have your exam. Directions, parking instructions, and other information is available on our website, [email or web address].            Nov 27, 2018  1:30 PM CST   (Arrive by 1:15 PM)   Return Vascular Visit with Frida Desai MD   [facility])    [address] [place] [address]  [address] [place] [address]   [phone]            Dec 03, 2018 10:30 AM CST   (Arrive by 10:15 AM)   Implanted Defibulator with Uc Cv Device [address] [place] [address])    [address] [place] [address]  3rd Floor  [id number]               Dec 03, 2018  1:30 PM CST   (Arrive by 1:15 PM)   RETURN ARRHYTHMIA with Deedee Baird MD   [facility])    [address] [place] [address]  [address] [place] [address]   [phone]            Jan 07, 2019 10:00 AM CST   (Arrive by 9:45 AM)   Return Visit with Roberto Sarmiento MD   [facility] ([facility])    [address] [place] [address]  4th Floor  [facility] [address]   [phone]            Jan 08, 2019 11:00 AM CST   [facility] Lab Draw with  MASONIC LAB DRAW   [facility] Lab Draw ([facility])    [address] [place] [address]  [address]  [facility] [address]   [phone]            Jan 08, 2019 12:30 PM CST    Return Neuropathy Visit with Jase Duarte MD   [facility]UR[facility] ([facility])    [address]  [address]  [facility] [address] [phone]            Apr 02, 2019 11:00 AM CDT   CT CHEST/ABDOMEN/PELVIS W CONTRAST with UCCT2   [facility] CT ([facility])    [address] [facility] [address]  1st Floor  [facility] [address] [phone]           How do I prepare for my exam? (Food and drink instructions) To prepare: Do not eat or drink for 2 hours before your exam. If you need to take medicine, you may take it with small sips of water. (We may ask you to take liquid medicine as well.)  How do I prepare for my exam? (Other instructions) Please arrive 30 minutes early for your CT.  Once in the department you might be asked to drink water 15-20 minutes prior to your exam.  If indicated you may be asked to drink an oral contrast in advance of your CT.  If this is the case, the imaging team will let you know or be in contact with you prior to your appointment  Patients over 70 or patients with diabetes or kidney problems: If you haven t had a blood test (creatinine test) within the last 30 days, the Cardiologist/Radiologist may require you to get this test prior to your exam.  If you have diabetes:  Continue to take your metformin medication on the day of your exam  What should I wear: Please wear loose clothing, such as a sweat suit or jogging clothes. Avoid snaps, zippers and other metal. We may ask you to undress and put on a hospital gown.  How long does the exam take: Most scans take less than 20 minutes.  What should I bring: Please bring any scans or X-rays taken at other hospitals, if similar tests were done. Also bring a list of your medicines, including vitamins, minerals and over-the-counter drugs. It is safest to leave personal items at home.  Do I need a : No  is needed.  What do I need to tell my doctor? Be sure to tell your  doctor: * If you have any allergies. * If there s any chance you are pregnant. * If you are breastfeeding.  What should I do after the exam: No restrictions, You may resume normal activities.  What is this test: A CT (computed tomography) scan is a series of pictures that allows us to look inside your body. The scanner creates images of the body in cross sections, much like slices of bread. This helps us see any problems more clearly. You may receive contrast (X-ray dye) before or during your scan. You will be asked to drink the contrast.  Who should I call with questions: If you have any questions, please call the Imaging Department where you will have your exam. Directions, parking instructions, and other information is available on our website, [email or web address].              Who to contact     Please call your clinic at [phone] to:    Ask questions about your health    Make or cancel appointments    Discuss your medicines    Learn about your test results    Speak to your doctor            Additional Information About Your Visit        [employer] Information     [employer] gives you secure access to your electronic health record. If you see a primary care provider, you can also send messages to your care team and make appointments. If you have questions, please call your primary care clinic.  If you do not have a primary care provider, please call [phone] and they will assist you.      [employer] is an electronic gateway that provides easy, online access to your medical records. With [employer], you can request a clinic appointment, read your test results, renew a prescription or communicate with your care team.     To access your existing account, please contact your [facility] or call [phone] for assistance.        Care EveryWhere ID     This is your Care EveryWhere ID. This could be used by other organizations to access your [employer] medical records  [id number]          Blood Pressure from Last 3 Encounters:   11/07/18 122/61   10/02/18 112/61   08/06/18 131/77    Weight from Last 3 Encounters:   11/07/18 63.5 kg (140 lb)   10/02/18 64.7 kg (142 lb 11.2 oz)   08/06/18 66.9 kg (147 lb 6.4 oz)              We Performed the Following     FLU VACCINE, INCREASED ANTIGEN, PRESV FREE, AGE 65+ [[address]]          Today's Medication Changes          These changes are accurate as of 11/9/18  2:11 PM.  If you have any questions, ask your nurse or doctor.               These medicines have changed or have updated prescriptions.        Dose/Directions    * warfarin 5 MG tablet   Commonly known as:  COUMADIN   This may have changed:  additional instructions   Used for:  Atrial flutter (H)        7.5 mg on Wed; 5 mg all other days  or as instructed by coumadin clinic.   Quantity:  35 tablet   Refills:  5       * warfarin 5 MG tablet   Commonly known as:  COUMADIN   This may have changed:  Another medication with the same name was changed. Make sure you understand how and when to take each.   Used for:  Paroxysmal atrial fibrillation (H), Long-term (current) use of anticoagulants        Take 1 tablet by mouth daily or as directed by the Coumadin clinic.   Quantity:  90 tablet   Refills:  3       * Notice:  This list has 2 medication(s) that are the same as other medications prescribed for you. Read the directions carefully, and ask your doctor or other care provider to review them with you.             Primary Care Provider Office Phone # Fax #    Roberto Sarmiento [phone]       [address] [place] [address]        Equal Access to Services     [facility]: Hadii giovanny huston Solali, waaxda luqadaha, qaybta kaalmasil farias . So [facility] [phone].    ATENCIÓN: Si habla español, tiene a aguirre disposición servicios gratuitos de asistencia lingüística. Llame al [phone].    We comply with applicable federal  civil rights laws and [place] laws. We do not discriminate on the basis of race, color, national origin, age, disability, sex, sexual orientation, or gender identity.            Thank you!     Thank you for choosing [facility]  for your care. Our goal is always to provide you with excellent care. Hearing back from our patients is one way we can continue to improve our services. Please take a few minutes to complete the written survey that you may receive in the mail after your visit with us. Thank you!             Your Updated Medication List - Protect others around you: Learn how to safely use, store and throw away your medicines at www.disposemymeds.org.          This list is accurate as of 11/9/18  2:11 PM.  Always use your most recent med list.                   Brand Name Dispense Instructions for use Diagnosis    aspirin 81 MG tablet      Take 1 tablet by mouth daily.        atorvastatin 40 MG tablet    LIPITOR    100 tablet    Take 1 tablet (40 mg) by mouth daily    Hyperlipidemia, unspecified hyperlipidemia type       bacitracin ointment     28 g    Apply topically 2 times daily APPLY TO LEFT LEG TWO TIMES PER DAY    Left leg cellulitis       Blood Pressure Monitor Kit     1 kit    1 Units daily    Hypertension       CENTRUM SILVER per tablet      Take 1 tablet by mouth daily. AM        ciclopirox 0.77 % cream    LOPROX    90 g    Apply topically 2 times daily To feet and toenails.    Dermatophytosis of nail, Tinea pedis of both feet       cyanocobalamin 1000 MCG tablet    vitamin  B-12    180 tablet    Take 2 tablets (2,000 mcg) by mouth daily    Anemia       doxazosin 2 MG tablet    CARDURA    90 tablet    Take 1/2 tab in the morning and 1/2 tab in the evening    Essential hypertension       fluticasone 50 MCG/ACT spray    FLONASE    3 Package    Spray 2 sprays into both nostrils daily    Unspecified sinusitis (chronic)       ketoconazole 2 % cream    NIZORAL    60 g    Apply topically  daily On hold    Tinea corporis       loratadine 10 MG tablet    CLARITIN     Take 10 mg by mouth as needed        metoprolol tartrate 25 MG tablet    LOPRESSOR    180 tablet    Take 1 tablet (25 mg) by mouth 2 times daily    Coronary artery disease involving native heart without angina pectoris, unspecified vessel or lesion type       mirtazapine 15 MG tablet    REMERON     Take 15 mg by mouth At Bedtime.        order for DME     1 Units    20-30 mm Hg knee length compression stockings.  Measure and fit.  Style and color per patient preference.  Doff n Aracelis per patient need.    PVD (peripheral vascular disease) (H), Ulcer of left lower extremity, limited to breakdown of skin (H)       triamcinolone 0.1 % cream    KENALOG    80 g    Apply topically 2 times daily For up to two weeks in a row    Atopic eczema       VITAMIN D-3 PO      Take 1 tablet by mouth 2 times daily        * warfarin 5 MG tablet    COUMADIN    35 tablet    7.5 mg on Wed; 5 mg all other days  or as instructed by coumadin clinic.    Atrial flutter (H)       * warfarin 5 MG tablet    COUMADIN    90 tablet    Take 1 tablet by mouth daily or as directed by the Coumadin clinic.    Paroxysmal atrial fibrillation (H), Long-term (current) use of anticoagulants       ZOLOFT 100 MG tablet   Generic drug:  sertraline      Take 100 mg by mouth every evening.        * Notice:  This list has 2 medication(s) that are the same as other medications prescribed for you. Read the directions carefully, and ask your doctor or other care provider to review them with you.

## 2018-11-12 ENCOUNTER — TELEPHONE (OUTPATIENT)
Dept: GASTROENTEROLOGY | Facility: CLINIC | Age: 83
End: 2018-11-12

## 2018-11-12 ENCOUNTER — CARE COORDINATION (OUTPATIENT)
Dept: GASTROENTEROLOGY | Facility: CLINIC | Age: 83
End: 2018-11-12

## 2018-11-12 NOTE — TELEPHONE ENCOUNTER
Able to reach pt today by phone.   Discussed biopsy showing adenocarcinoma. Has appt with surgery this Wed.    ALEYDA Esteban MD  Associate Professor of Medicine  Division of Gastroenterology, Hepatology and Nutrition  [facility]

## 2018-11-12 NOTE — PROGRESS NOTES
Cancelled patient CT as h e has already has this testing completed on 10/1/2018. Patient verbalized understanding of the cancellation.

## 2018-11-13 ENCOUNTER — TELEPHONE (OUTPATIENT)
Dept: SURGERY | Facility: CLINIC | Age: 83
End: 2018-11-13

## 2018-11-13 DIAGNOSIS — Z79.01 LONG TERM CURRENT USE OF ANTICOAGULANT THERAPY: ICD-10-CM

## 2018-11-13 DIAGNOSIS — I48.0 PAROXYSMAL ATRIAL FIBRILLATION (H): ICD-10-CM

## 2018-11-13 DIAGNOSIS — C18.9 MALIGNANT NEOPLASM OF COLON (H): ICD-10-CM

## 2018-11-13 LAB
ALBUMIN SERPL-MCNC: 3.4 G/DL (ref 3.4–5)
ALP SERPL-CCNC: 99 U/L (ref 40–150)
ALT SERPL W P-5'-P-CCNC: 20 U/L (ref 0–70)
ANION GAP SERPL CALCULATED.3IONS-SCNC: 4 MMOL/L (ref 3–14)
AST SERPL W P-5'-P-CCNC: 16 U/L (ref 0–45)
BASOPHILS # BLD AUTO: 0 10E9/L (ref 0–0.2)
BASOPHILS NFR BLD AUTO: 0.3 %
BILIRUB SERPL-MCNC: 0.2 MG/DL (ref 0.2–1.3)
BUN SERPL-MCNC: 48 MG/DL (ref 7–30)
CALCIUM SERPL-MCNC: 8.7 MG/DL (ref 8.5–10.1)
CEA SERPL-MCNC: 4.2 UG/L (ref 0–2.5)
CHLORIDE SERPL-SCNC: 112 MMOL/L (ref 94–109)
CO2 SERPL-SCNC: 27 MMOL/L (ref 20–32)
CREAT SERPL-MCNC: 1.27 MG/DL (ref 0.66–1.25)
DIFFERENTIAL METHOD BLD: ABNORMAL
EOSINOPHIL # BLD AUTO: 0.1 10E9/L (ref 0–0.7)
EOSINOPHIL NFR BLD AUTO: 1.9 %
ERYTHROCYTE [DISTWIDTH] IN BLOOD BY AUTOMATED COUNT: 14.7 % (ref 10–15)
GFR SERPL CREATININE-BSD FRML MDRD: 54 ML/MIN/1.7M2
GLUCOSE SERPL-MCNC: 96 MG/DL (ref 70–99)
HCT VFR BLD AUTO: 30.4 % (ref 40–53)
HGB BLD-MCNC: 9.4 G/DL (ref 13.3–17.7)
IMM GRANULOCYTES # BLD: 0 10E9/L (ref 0–0.4)
IMM GRANULOCYTES NFR BLD: 0.3 %
INR PPP: 1.66 (ref 0.86–1.14)
LYMPHOCYTES # BLD AUTO: 0.6 10E9/L (ref 0.8–5.3)
LYMPHOCYTES NFR BLD AUTO: 9.5 %
MCH RBC QN AUTO: 29.6 PG (ref 26.5–33)
MCHC RBC AUTO-ENTMCNC: 30.9 G/DL (ref 31.5–36.5)
MCV RBC AUTO: 96 FL (ref 78–100)
MONOCYTES # BLD AUTO: 0.7 10E9/L (ref 0–1.3)
MONOCYTES NFR BLD AUTO: 11 %
NEUTROPHILS # BLD AUTO: 5.2 10E9/L (ref 1.6–8.3)
NEUTROPHILS NFR BLD AUTO: 77 %
NRBC # BLD AUTO: 0 10*3/UL
NRBC BLD AUTO-RTO: 0 /100
PLATELET # BLD AUTO: 196 10E9/L (ref 150–450)
POTASSIUM SERPL-SCNC: 4.3 MMOL/L (ref 3.4–5.3)
PREALB SERPL IA-MCNC: 22 MG/DL (ref 15–45)
PROT SERPL-MCNC: 7.4 G/DL (ref 6.8–8.8)
RBC # BLD AUTO: 3.18 10E12/L (ref 4.4–5.9)
SODIUM SERPL-SCNC: 143 MMOL/L (ref 133–144)
WBC # BLD AUTO: 6.7 10E9/L (ref 4–11)

## 2018-11-14 ENCOUNTER — OFFICE VISIT (OUTPATIENT)
Dept: SURGERY | Facility: CLINIC | Age: 83
End: 2018-11-14
Payer: COMMERCIAL

## 2018-11-14 ENCOUNTER — ANTICOAGULATION THERAPY VISIT (OUTPATIENT)
Dept: ANTICOAGULATION | Facility: CLINIC | Age: 83
End: 2018-11-14

## 2018-11-14 VITALS
HEART RATE: 117 BPM | OXYGEN SATURATION: 99 % | SYSTOLIC BLOOD PRESSURE: 132 MMHG | TEMPERATURE: 97.6 F | DIASTOLIC BLOOD PRESSURE: 80 MMHG | HEIGHT: 68 IN | WEIGHT: 146.3 LBS | BODY MASS INDEX: 22.17 KG/M2

## 2018-11-14 DIAGNOSIS — I48.0 PAROXYSMAL ATRIAL FIBRILLATION (H): ICD-10-CM

## 2018-11-14 DIAGNOSIS — C18.3 MALIGNANT NEOPLASM OF HEPATIC FLEXURE (H): Primary | ICD-10-CM

## 2018-11-14 LAB — COPATH REPORT: NORMAL

## 2018-11-14 ASSESSMENT — PAIN SCALES - GENERAL: PAINLEVEL: NO PAIN (0)

## 2018-11-14 NOTE — MR AVS SNAPSHOT
Noe Florence   11/14/2018   Anticoagulation Therapy Visit    Description:  83 year old male   Provider:  Danya Edwards RN   Department:  [facility]           INR as of 11/14/2018     Today's INR 1.66! (11/13/2018)      Anticoagulation Summary as of 11/14/2018     INR goal 2.0-3.0   Today's INR 1.66! (11/13/2018)   Full warfarin instructions 5 mg every day   Next INR check 11/21/2018    Indications   Atrial fibrillation (H) [I48.91] [I48.91]  Long-term (current) use of anticoagulants [Z79.01] [Z79.01]         November 2018 Details    Sun Mon Tue Wed Thu Fri Sat         1               2               3                 4               5               6               7               8               9               10                 11               12               13               14      5 mg   See details      15      5 mg         16      5 mg         17      5 mg           18      5 mg         19      5 mg         20      5 mg         21            22               23               24                 25               26               27               28               29               30                 Date Details   11/14 This INR check       Date of next INR:  11/21/2018         How to take your warfarin dose     To take:  5 mg Take 1 of the 5 mg tablets.

## 2018-11-14 NOTE — MR AVS SNAPSHOT
After Visit Summary   11/14/2018    Noe Florence    MRN: [id number]           Patient Information     Date Of Birth          1935        Visit Information        Provider Department      11/14/2018 3:30 PM Chanelle Guzmán MD [facility]        Today's Diagnoses     Malignant neoplasm of hepatic flexure (H)    -  1       Follow-ups after your visit        Your next 10 appointments already scheduled     Nov 20, 2018 11:00 AM CST   US CAROTID BILATERAL with UCUSV2   [facility] ([facility])    [address] [place] [address] [phone]           How do I prepare for my exam? (Food and drink instructions) No Food and Drink Restrictions.  How do I prepare for my exam? (Other instructions) You do not need to do anything special to prepare for your exam.  What should I wear: Wear comfortable clothes.  How long does the exam take: Most ultrasounds take 30 to 60 minutes.  What should I bring: Bring a list of your medicines, including vitamins, minerals and over-the-counter drugs. It is safest to leave personal items at home.  Do I need a :  No  is needed.  What do I need to tell my doctor: Tell your doctor about any allergies you may have.  What should I do after the exam: No restrictions, You may resume normal activities.  What is this test: An ultrasound uses sound waves to make pictures of the body. Sound waves do not cause pain. The only discomfort may be the pressure of the wand against your skin or full bladder.  Who should I call with questions: If you have any questions, please call the Imaging Department where you will have your exam. Directions, parking instructions, and other information is available on our website, [email or web address].            Nov 27, 2018  1:30 PM CST   (Arrive by 1:15 PM)   Return Vascular Visit with Frida Desai MD   [facility] ([facility] [place])    [address]  [address]  [facility] [address]   [phone]            Dec 03, 2018 10:30 AM CST   (Arrive by 10:15 AM)   Implanted Defibulator with Uc Cv Device 1   [facility] ([facility])    [address]  3rd Floor  [address]               Dec 03, 2018  1:30 PM CST   (Arrive by 1:15 PM)   RETURN ARRHYTHMIA with Deedee Baird MD   [facility] ([facility])    [address]  [address]  [facility] [address]   [phone]            Jan 07, 2019 10:00 AM CST   (Arrive by 9:45 AM)   Return Visit with Roberto Sarmiento MD   [facility] ([facility])    [address] [place] [address]  4th Floor  [facility] [address]   [phone]            Jan 08, 2019 11:00 AM CST   [facility] Lab Draw with  MASONIC LAB DRAW   [facility] ([facility])    [address] [place] [address]  [address]  [facility] [address] [phone]            Jan 08, 2019 12:30 PM CST   Return Neuropathy Visit with Jase Duarte MD   [facility] ([facility])    [address]  [address]  [facility] [address]   [phone]            Apr 02, 2019 10:15 AM CDT   LAB with  LAB   [facility] ([facility])    [address] [place] [address]  1st Floor  [facility] [address] [phone]           Please do not eat 10-12 hours before your appointment if you are coming in fasting for labs on lipids, cholesterol, or glucose (sugar). This does not apply to pregnant women. Water, hot tea and black coffee (with nothing added) are okay. Do not drink other fluids, diet soda or chew gum.            Apr 02, 2019 11:00 AM CDT   CT CHEST/ABDOMEN/PELVIS W CONTRAST with UCCT2   [facility] [place] [facility] ([facility])    [address] [place] [address]  1st Floor  [facility] [address]    [phone]           How do I prepare for my exam? (Food and drink instructions) To prepare: Do not eat or drink for 2 hours before your exam. If you need to take medicine, you may take it with small sips of water. (We may ask you to take liquid medicine as well.)  How do I prepare for my exam? (Other instructions) Please arrive 30 minutes early for your CT.  Once in the department you might be asked to drink water 15-20 minutes prior to your exam.  If indicated you may be asked to drink an oral contrast in advance of your CT.  If this is the case, the imaging team will let you know or be in contact with you prior to your appointment  Patients over 70 or patients with diabetes or kidney problems: If you haven t had a blood test (creatinine test) within the last 30 days, the Cardiologist/Radiologist may require you to get this test prior to your exam.  If you have diabetes:  Continue to take your metformin medication on the day of your exam  What should I wear: Please wear loose clothing, such as a sweat suit or jogging clothes. Avoid snaps, zippers and other metal. We may ask you to undress and put on a hospital gown.  How long does the exam take: Most scans take less than 20 minutes.  What should I bring: Please bring any scans or X-rays taken at other hospitals, if similar tests were done. Also bring a list of your medicines, including vitamins, minerals and over-the-counter drugs. It is safest to leave personal items at home.  Do I need a : No  is needed.  What do I need to tell my doctor? Be sure to tell your doctor: * If you have any allergies. * If there s any chance you are pregnant. * If you are breastfeeding.  What should I do after the exam: No restrictions, You may resume normal activities.  What is this test: A CT (computed tomography) scan is a series of pictures that allows us to look inside your body. The scanner creates images of the body in cross sections, much like slices of bread.  "This helps us see any problems more clearly. You may receive contrast (X-ray dye) before or during your scan. You will be asked to drink the contrast.  Who should I call with questions: If you have any questions, please call the Imaging Department where you will have your exam. Directions, parking instructions, and other information is available on our website, [email or web address].            Apr 08, 2019 10:45 AM CDT   (Arrive by 10:30 AM)   Return Visit with Francisco Gilliam MD   [facility] ([facility])    [address] [place] [address]  [address]  [facility] [address] [phone]              Who to contact     Please call your clinic at [phone] to:    Ask questions about your health    Make or cancel appointments    Discuss your medicines    Learn about your test results    Speak to your doctor            Additional Information About Your Visit        No [employer] Information     [employer] gives you secure access to your electronic health record. If you see a primary care provider, you can also send messages to your care team and make appointments. If you have questions, please call your primary care clinic.  If you do not have a primary care provider, please call [phone] and they will assist you.      [employer] is an electronic gateway that provides easy, online access to your medical records. With [employer], you can request a clinic appointment, read your test results, renew a prescription or communicate with your care team.     To access your existing account, please contact your [facility] or call [phone] for assistance.        Care EveryWhere ID     This is your Care EveryWhere ID. This could be used by other organizations to access your [place] medical records  [id number]        Your Vitals Were     Pulse Temperature Height Pulse Oximetry BMI (Body Mass Index)       117 97.6  F (36.4  C) (Oral) 5' 7.99\" 99% 22.25 kg/m2  "       Blood Pressure from Last 3 Encounters:   11/14/18 132/80   11/07/18 122/61   10/02/18 112/61    Weight from Last 3 Encounters:   11/14/18 146 lb 4.8 oz   11/07/18 140 lb   10/02/18 142 lb 11.2 oz              Today, you had the following     No orders found for display         Today's Medication Changes          These changes are accurate as of 11/14/18  4:39 PM.  If you have any questions, ask your nurse or doctor.               These medicines have changed or have updated prescriptions.        Dose/Directions    * warfarin 5 MG tablet   Commonly known as:  COUMADIN   This may have changed:  additional instructions   Used for:  Atrial flutter (H)        7.5 mg on Wed; 5 mg all other days  or as instructed by coumadin clinic.   Quantity:  35 tablet   Refills:  5       * warfarin 5 MG tablet   Commonly known as:  COUMADIN   This may have changed:  Another medication with the same name was changed. Make sure you understand how and when to take each.   Used for:  Paroxysmal atrial fibrillation (H), Long-term (current) use of anticoagulants        Take 1 tablet by mouth daily or as directed by the Coumadin clinic.   Quantity:  90 tablet   Refills:  3       * Notice:  This list has 2 medication(s) that are the same as other medications prescribed for you. Read the directions carefully, and ask your doctor or other care provider to review them with you.             Primary Care Provider Office Phone # Fax #    Roberto Sarmiento [phone]       [address] [place] [address]        Equal Access to Services     LEE CASTILLO AH: Hadamanda huston Solali, waaxda luqadaha, qaybta kaalmada adenatalie blueay sera cifuentes adeidalia minor. So [facility] [phone].    ATENCIÓN: Si habla español, tiene a aguirre disposición servicios gratuitos de asistencia lingüística. Llame al [phone].    We comply with applicable federal civil rights laws and [place] laws. We do not  discriminate on the basis of race, color, national origin, age, disability, sex, sexual orientation, or gender identity.            Thank you!     Thank you for choosing [facility]  for your care. Our goal is always to provide you with excellent care. Hearing back from our patients is one way we can continue to improve our services. Please take a few minutes to complete the written survey that you may receive in the mail after your visit with us. Thank you!             Your Updated Medication List - Protect others around you: Learn how to safely use, store and throw away your medicines at www.disposemymeds.org.          This list is accurate as of 11/14/18  4:39 PM.  Always use your most recent med list.                   Brand Name Dispense Instructions for use Diagnosis    aspirin 81 MG tablet      Take 1 tablet by mouth daily.        atorvastatin 40 MG tablet    LIPITOR    100 tablet    Take 1 tablet (40 mg) by mouth daily    Hyperlipidemia, unspecified hyperlipidemia type       bacitracin ointment     28 g    Apply topically 2 times daily APPLY TO LEFT LEG TWO TIMES PER DAY    Left leg cellulitis       Blood Pressure Monitor Kit     1 kit    1 Units daily    Hypertension       CENTRUM SILVER per tablet      Take 1 tablet by mouth daily. AM        ciclopirox 0.77 % cream    LOPROX    90 g    Apply topically 2 times daily To feet and toenails.    Dermatophytosis of nail, Tinea pedis of both feet       cyanocobalamin 1000 MCG tablet    vitamin  B-12    180 tablet    Take 2 tablets (2,000 mcg) by mouth daily    Anemia       doxazosin 2 MG tablet    CARDURA    90 tablet    Take 1/2 tab in the morning and 1/2 tab in the evening    Essential hypertension       fluticasone 50 MCG/ACT spray    FLONASE    3 Package    Spray 2 sprays into both nostrils daily    Unspecified sinusitis (chronic)       ketoconazole 2 % cream    NIZORAL    60 g    Apply topically daily On hold    Tinea corporis        loratadine 10 MG tablet    CLARITIN     Take 10 mg by mouth as needed        metoprolol tartrate 25 MG tablet    LOPRESSOR    180 tablet    Take 1 tablet (25 mg) by mouth 2 times daily    Coronary artery disease involving native heart without angina pectoris, unspecified vessel or lesion type       mirtazapine 15 MG tablet    REMERON     Take 15 mg by mouth At Bedtime.        order for DME     1 Units    20-30 mm Hg knee length compression stockings.  Measure and fit.  Style and color per patient preference.  Doff n Aracelis per patient need.    PVD (peripheral vascular disease) (H), Ulcer of left lower extremity, limited to breakdown of skin (H)       triamcinolone 0.1 % cream    KENALOG    80 g    Apply topically 2 times daily For up to two weeks in a row    Atopic eczema       VITAMIN D-3 PO      Take 1 tablet by mouth 2 times daily        * warfarin 5 MG tablet    COUMADIN    35 tablet    7.5 mg on Wed; 5 mg all other days  or as instructed by coumadin clinic.    Atrial flutter (H)       * warfarin 5 MG tablet    COUMADIN    90 tablet    Take 1 tablet by mouth daily or as directed by the Coumadin clinic.    Paroxysmal atrial fibrillation (H), Long-term (current) use of anticoagulants       ZOLOFT 100 MG tablet   Generic drug:  sertraline      Take 100 mg by mouth every evening.        * Notice:  This list has 2 medication(s) that are the same as other medications prescribed for you. Read the directions carefully, and ask your doctor or other care provider to review them with you.

## 2018-11-14 NOTE — LETTER
2018       RE: Noe Florence  [address] [facility] [address]     Dear Colleague,    Thank you for referring your patient, Noe Florence, to the [facility] at [facility]. Please see a copy of my visit note below.    Colon and Rectal Surgery Clinic Note      Referring Provider:  Roberto Esteban MD  [address] [place] [address]     Primary Care Provider:  Roberto Sarmiento      RE: Noe Florence  : 1935  GALA: 2018    Noe Florence is a 83 year old male with history of sigmoid cancer s/p sigmoidectomy (2014) here for an asynchronous colon cancer.     History of Present Ilness: Patient has been followed closely for reoccurrence monitoring and has been noted to have a persistently elevated CEA (3-5) without concerning imaging. However, in his last clinic visit with oncology (10/2/2018), he was noted to have a significant weight loss. A colonoscopy was obtained (2018) that revealed a hepatic flexure mass. Pathology showed an invasive moderately differentiated adenocarcinoma. A routine follow up CT scan 10/2/2018 showed no metastic disease. Patient returns to clinic for discussion regarding surgical options. Patient has no symptoms other than weight loss, though states that has stabilized. Denies pain or bloody stools.     PLEASE SEE NOTE BELOW FOR PHYSICAL EXAMINATION, REVIEW OF SYSTEMS, AND OTHER HISTORY.    Assessment/plan:  Noe Florence is a 83 y.o. male with history of sigmoid cancer status post sigmoidectomy (2014) with recent colonoscopy revealing moderately differentiated adenocarcinoma at hepatic flexure.     CEA has been stable (3-5 since 2017).     CT scan 10/1/2018 showed no evidence for metastatic disease.     We discussed the surgical resection including laparoscopic right extended colectomy. He would get a Preoperative Assessment Center (PAC) appointment, need to figure out  Coumadin bridge, mechanical bowel preparation with antibiotics.     Patient has an upcoming cardiology appointment for possible ICD replacement. We would like for him to reschedule this appointment for as soon as possible as it would determine our operative schedule and we will help to move this sooner and figure out the plan.     Patient signed consent in the office and received information regarding preoperative preparation.    Total time was 30 minutes, over 50% was spent counseling the patient.     Chanelle Guzmán MD  Colon and Rectal Surgery Attending  Department of Surgery  [facility]    PLEASE SEE NOTE BELOW FOR PHYSICAL EXAMINATION, REVIEW OF SYSTEMS, AND OTHER HISTORY.  -------------------------------------------------------------------------------------------------------------------        Physical examination:  Examination was chaperoned by Briana Yip MD.   Vitals: There were no vitals taken for this visit.  BMI= There is no height or weight on file to calculate BMI.  Abdomen: Incisional scars well healed, slightly retracted in scar on midline. Loose skin present consistent with recent weight loss.       Pathology:  HEPATIC FLEXURE LESION BIOPSY:   - Invasive moderately differentiated adenocarcinoma     Colonoscopy 11/7/2018  Impression:          Noe Florence is an 83 year old male with history of                        colorectal cancer s/p sigmoidectomy in 2014 presenting                        for follow-up colonoscopy. Colonoscopy revealing hepatic                        flexure mass that was biopsied and tattooed 2 cm                        upstream (towards the cecum) of the lesion.                        - Diverticulosis in the descending colon.                        - Likely malignant tumor at the hepatic flexure.                        Biopsied. Tattooed as described above, however the                        lesion is clearly located at the hepatic  flexure                        (confirmed with scope-guide). This is clearly not                        amenable to endoscopic resection and should be                        considered malignant, regardless of biopsy results.                        - The distal rectum and anal verge are normal on                        retroflexion view.     CT A/P 10/1/2018  1.  Postoperative changes of partial colectomy for colon cancer. No  evidence of metastatic disease in the chest, abdomen, or pelvis.  2.  Small to moderate hiatal hernia.  3.  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   Additional incidental and chronic findings as above.    Laboratory data:    Recent Labs   Lab Test  11/13/18   1818   WBC  6.7   HGB  9.4*   PLT  196   CR  1.27*   ALBUMIN  3.4   BILITOTAL  0.2   ALKPHOS  99   ALT  20   AST  16   INR  1.66*     For details of past medical history, surgical history, family history, medications, allergies, and review of systems, please see details below.    Medical history:  Past Medical History:   Diagnosis Date     Advanced open-angle glaucoma      Antiplatelet or antithrombotic long-term use      Atrial fibrillation (H)      CKD (chronic kidney disease), stage III (H) 2005     Colon cancer (H)     Stage II-B colon cancer     Coronary artery disease     s/p CABG x 2, JEREMY x 2     Diverticulitis      Hyperlipidemia      Hypertension      Ischemic cardiomyopathy      MGUS (monoclonal gammopathy of unknown significance)      Nonsenile cataract     BE     Pacemaker      Polymorphic ventricular tachycardia (H)      Polymyalgia rheumatica (H)      PVD (posterior vitreous detachment), both eyes 2005     S/P ablation of atrial flutter 6/20/14    CTI     Stented coronary artery      SVT (supraventricular tachycardia) (H)     PPM/AICD for NSVT     Upper leg DVT (deep venous thromboembolism), chronic (H)     Left     Weight loss, unintentional 2017    15 lb in 4 months       Surgical history:  Past  Surgical History:   Procedure Laterality Date     C CABG, ARTERY-VEIN, FOUR  2006    LIMA-LAD, SVG-Rt PDA, SVG-OM2, SVG-Diag 1     C CABG, VEIN, SINGLE  1994    1-vessel CABG, SVG->PDRCA      CATARACT IOL, RT/LT Bilateral      COLONOSCOPY  3/13/2014    Procedure: COMBINED COLONOSCOPY, SINGLE BIOPSY/POLYPECTOMY BY BIOPSY;;  Surgeon: Mary Gerber MD;  Location: [facility]     COLONOSCOPY N/A 6/22/2015    Procedure: COLONOSCOPY;  Surgeon: Marilin Newman MD;  Location: [facility]     COLONOSCOPY N/A 11/7/2018    Procedure: COMBINED COLONOSCOPY, SINGLE OR MULTIPLE BIOPSY/POLYPECTOMY BY BIOPSY;  Surgeon: Roberto Esteban MD;  Location: [facility]     H ABLATION ATRIAL FLUTTER       HEART CATH DRUG ELUTING STENT PLACEMENT  4/19/2012    JEREMY x 2 to LCx     IMPLANT IMPLANTABLE CARDIOVERTER DEFIBRILLATOR  5-    ppm/aicd     KNEE SURGERY      right and left knee surgeries     LAPAROSCOPIC ASSISTED COLECTOMY LEFT (DESCENDING)  4/8/2014    Procedure: LAPAROSCOPIC ASSISTED COLECTOMY LEFT (DESCENDING);  Hand assisted Laparoscopic Sigmoid Colectomy , Laparoscopic mobilization of spleenic flexure *Latex Allergy*Anesthesia General with Block;  Surgeon: Chanelle Guzmán MD;  Location: [facility]     REPAIR VALVE MITRAL  2006     30-mm Medtronic Julian ring      SELECTIVE LASER TRABECULOPLASTY (SLT) OD (RIGHT EYE)  4/10, 1/12,+1/9/13    RE     SELECTIVE LASER TRABECULOPLASTY (SLT) OS (LEFT EYE)  5/2012     SHOULDER SURGERY      right rotator cuff       Family history:  Family History   Problem Relation Age of Onset     C.A.D. Father      Anesthesia Reaction No family hx of      Crohn Disease No family hx of      Ulcerative Colitis No family hx of      Cancer - colorectal No family hx of      Macular Degeneration No family hx of      Cancer No family hx of      No known family hx of skin cancer     Melanoma No family hx of      Skin Cancer No family hx of        Medications:  Current Outpatient Prescriptions    Medication Sig Dispense Refill     aspirin 81 MG tablet Take 1 tablet by mouth daily.       atorvastatin (LIPITOR) 40 MG tablet Take 1 tablet (40 mg) by mouth daily 100 tablet 3     bacitracin ointment Apply topically 2 times daily APPLY TO LEFT LEG TWO TIMES PER DAY 28 g 0     Blood Pressure Monitor KIT 1 Units daily (Patient not taking: Reported on 8/2/2018) 1 kit 0     Cholecalciferol (VITAMIN D-3 PO) Take 1 tablet by mouth 2 times daily       ciclopirox (LOPROX) 0.77 % cream Apply topically 2 times daily To feet and toenails. 90 g 6     cyanocobalamin (VITAMIN  B-12) 1000 MCG tablet Take 2 tablets (2,000 mcg) by mouth daily 180 tablet 3     doxazosin (CARDURA) 2 MG tablet Take 1/2 tab in the morning and 1/2 tab in the evening 90 tablet 3     fluticasone (FLONASE) 50 MCG/ACT nasal spray Spray 2 sprays into both nostrils daily (Patient not taking: Reported on 5/29/2018) 3 Package 0     ketoconazole (NIZORAL) 2 % cream Apply topically daily On hold 60 g 3     loratadine (CLARITIN) 10 MG tablet Take 10 mg by mouth as needed        metoprolol tartrate (LOPRESSOR) 25 MG tablet Take 1 tablet (25 mg) by mouth 2 times daily 180 tablet 3     mirtazapine (REMERON) 15 MG tablet Take 15 mg by mouth At Bedtime.       Multiple Vitamins-Minerals (CENTRUM SILVER) per tablet Take 1 tablet by mouth daily. AM        order for DME 20-30 mm Hg knee length compression stockings.  Measure and fit.  Style and color per patient preference.  Pat Hsu per patient need. 1 Units 0     sertraline (ZOLOFT) 100 MG tablet Take 100 mg by mouth every evening.       triamcinolone (KENALOG) 0.1 % cream Apply topically 2 times daily For up to two weeks in a row 80 g 3     warfarin (COUMADIN) 5 MG tablet Take 1 tablet by mouth daily or as directed by the Coumadin clinic. 90 tablet 3     warfarin (COUMADIN) 5 MG tablet 7.5 mg on Wed; 5 mg all other days  or as instructed by coumadin clinic. (Patient taking differently: 5 mg all days  or as  instructed by coumadin clinic.) 35 tablet 5       Allergies:  The patientis allergic to latex.    Social history:  Social History   Substance Use Topics     Smoking status: Former Smoker     Types: Cigarettes, Cigars     Quit date: 1/1/1968     Smokeless tobacco: Never Used     Alcohol use 0.0 oz/week     0 Standard drinks or equivalent per week      Comment: 2-3 drinks twice weekly     Marital status: .    Review of Systems:  There are no exam notes on file for this visit.       Again, thank you for allowing me to participate in the care of your patient.      Sincerely,    Chanelle Guzmán MD

## 2018-11-14 NOTE — PROGRESS NOTES
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     History of Present Ilness: Patient has been followed closely for reoccurrence monitoring and has been noted to have a persistently elevated CEA (3-5) without concerning imaging. However, in his last clinic visit with oncology (10/2/2018), he was noted to have a significant weight loss. A colonoscopy was obtained (2018) that revealed a hepatic flexure mass. Pathology showed an invasive moderately differentiated adenocarcinoma. A routine follow up CT scan 10/2/2018 showed no metastic disease. Patient returns to clinic for discussion regarding surgical options. Patient has no symptoms other than weight loss, though states that has stabilized. Denies pain or bloody stools.     PLEASE SEE NOTE BELOW FOR PHYSICAL EXAMINATION, REVIEW OF SYSTEMS, AND OTHER HISTORY.    Assessment/plan:  Noe Florence is a 83 y.o. male with history of sigmoid cancer status post sigmoidectomy (2014) with recent colonoscopy revealing moderately differentiated adenocarcinoma at hepatic flexure.     CEA has been stable (3-5 since 2017).     CT scan 10/1/2018 showed no evidence for metastatic disease.     We discussed the surgical resection including laparoscopic right extended colectomy. He would get a Preoperative Assessment Center (P[facility]) appointment, need to figure out Coumadin bridge, mechanical bowel preparation with antibiotics.     Patient has an upcoming cardiology appointment for possible ICD replacement. We would like for him to reschedule this appointment for as soon as possible as it would determine our operative schedule and we will help to move this  sooner and figure out the plan.     Patient signed consent in the office and received information regarding preoperative preparation.    Total time was 30 minutes, over 50% was spent counseling the patient.     Chanelle Guzmán MD  Colon and Rectal Surgery Attending  Department of Surgery  [facility]    PLEASE SEE NOTE BELOW FOR PHYSICAL EXAMINATION, REVIEW OF SYSTEMS, AND OTHER HISTORY.  -------------------------------------------------------------------------------------------------------------------        Physical examination:  Examination was chaperoned by Briana Yip MD.   Vitals: There were no vitals taken for this visit.  BMI= There is no height or weight on file to calculate BMI.  Abdomen: Incisional scars well healed, slightly retracted in scar on midline. Loose skin present consistent with recent weight loss.       Pathology:  HEPATIC FLEXURE LESION BIOPSY:   - Invasive moderately differentiated adenocarcinoma     Colonoscopy 11/7/2018  Impression:          Noe Florence is an 83 year old male with history of                        colorectal cancer s/p sigmoidectomy in 2014 presenting                        for follow-up colonoscopy. Colonoscopy revealing hepatic                        flexure mass that was biopsied and tattooed 2 cm                        upstream (towards the cecum) of the lesion.                        - Diverticulosis in the descending colon.                        - Likely malignant tumor at the hepatic flexure.                        Biopsied. Tattooed as described above, however the                        lesion is clearly located at the hepatic flexure                        (confirmed with scope-guide). This is clearly not                        amenable to endoscopic resection and should be                        considered malignant, regardless of biopsy results.                        - The distal rectum and anal verge are normal on                         retroflexion view.     CT A/P 10/1/2018  1.  Postoperative changes of partial colectomy for colon cancer. No  evidence of metastatic disease in the chest, abdomen, or pelvis.  2.  Small to moderate hiatal hernia.  3.  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   Additional incidental and chronic findings as above.    Laboratory data:    Recent Labs   Lab Test  11/13/18   1818   WBC  6.7   HGB  9.4*   PLT  196   CR  1.27*   ALBUMIN  3.4   BILITOTAL  0.2   ALKPHOS  99   ALT  20   AST  16   INR  1.66*     For details of past medical history, surgical history, family history, medications, allergies, and review of systems, please see details below.    Medical history:  Past Medical History:   Diagnosis Date     Advanced open-angle glaucoma      Antiplatelet or antithrombotic long-term use      Atrial fibrillation (H)      CKD (chronic kidney disease), stage III (H) 2005     Colon cancer (H)     Stage II-B colon cancer     Coronary artery disease     s/p CABG x 2, JEREMY x 2     Diverticulitis      Hyperlipidemia      Hypertension      Ischemic cardiomyopathy      MGUS (monoclonal gammopathy of unknown significance)      Nonsenile cataract     BE     Pacemaker      Polymorphic ventricular tachycardia (H)      Polymyalgia rheumatica (H)      PVD (posterior vitreous detachment), both eyes 2005     S/P ablation of atrial flutter 6/20/14    CTI     Stented coronary artery      SVT (supraventricular tachycardia) (H)     PPM/AICD for NSVT     Upper leg DVT (deep venous thromboembolism), chronic (H)     Left     Weight loss, unintentional 2017    15 lb in 4 months       Surgical history:  Past Surgical History:   Procedure Laterality Date     C CABG, ARTERY-VEIN, FOUR  2006    LIMA-LAD, SVG-Rt PDA, SVG-OM2, SVG-Diag 1     C CABG, VEIN, SINGLE  1994    1-vessel CABG, SVG->PDRCA      CATARACT IOL, RT/LT Bilateral      COLONOSCOPY  3/13/2014    Procedure: COMBINED COLONOSCOPY, SINGLE  BIOPSY/POLYPECTOMY BY BIOPSY;;  Surgeon: Mary Gerber MD;  Location: [facility]     COLONOSCOPY N/A 6/22/2015    Procedure: COLONOSCOPY;  Surgeon: Marilin Newman MD;  Location: [facility]     COLONOSCOPY N/A 11/7/2018    Procedure: COMBINED COLONOSCOPY, SINGLE OR MULTIPLE BIOPSY/POLYPECTOMY BY BIOPSY;  Surgeon: Roberto Esteban MD;  Location: [facility]     H ABLATION ATRIAL FLUTTER       HEART CATH DRUG ELUTING STENT PLACEMENT  4/19/2012    JEREMY x 2 to LCx     IMPLANT IMPLANTABLE CARDIOVERTER DEFIBRILLATOR  5-    ppm/aicd     KNEE SURGERY      right and left knee surgeries     LAPAROSCOPIC ASSISTED COLECTOMY LEFT (DESCENDING)  4/8/2014    Procedure: LAPAROSCOPIC ASSISTED COLECTOMY LEFT (DESCENDING);  Hand assisted Laparoscopic Sigmoid Colectomy , Laparoscopic mobilization of spleenic flexure *Latex Allergy*Anesthesia General with Block;  Surgeon: Chanelle Guzmán MD;  Location: [facility]     REPAIR VALVE MITRAL  2006     30-mm Medtronic Julian ring      SELECTIVE LASER TRABECULOPLASTY (SLT) OD (RIGHT EYE)  4/10, 1/12,+1/9/13    RE     SELECTIVE LASER TRABECULOPLASTY (SLT) OS (LEFT EYE)  5/2012     SHOULDER SURGERY      right rotator cuff       Family history:  Family History   Problem Relation Age of Onset     C.A.D. Father      Anesthesia Reaction No family hx of      Crohn Disease No family hx of      Ulcerative Colitis No family hx of      Cancer - colorectal No family hx of      Macular Degeneration No family hx of      Cancer No family hx of      No known family hx of skin cancer     Melanoma No family hx of      Skin Cancer No family hx of        Medications:  Current Outpatient Prescriptions   Medication Sig Dispense Refill     aspirin 81 MG tablet Take 1 tablet by mouth daily.       atorvastatin (LIPITOR) 40 MG tablet Take 1 tablet (40 mg) by mouth daily 100 tablet 3     bacitracin ointment Apply topically 2 times daily APPLY TO LEFT LEG TWO TIMES PER DAY 28 g 0     Blood Pressure  Monitor KIT 1 Units daily (Patient not taking: Reported on 8/2/2018) 1 kit 0     Cholecalciferol (VITAMIN D-3 PO) Take 1 tablet by mouth 2 times daily       ciclopirox (LOPROX) 0.77 % cream Apply topically 2 times daily To feet and toenails. 90 g 6     cyanocobalamin (VITAMIN  B-12) 1000 MCG tablet Take 2 tablets (2,000 mcg) by mouth daily 180 tablet 3     doxazosin (CARDURA) 2 MG tablet Take 1/2 tab in the morning and 1/2 tab in the evening 90 tablet 3     fluticasone (FLONASE) 50 MCG/ACT nasal spray Spray 2 sprays into both nostrils daily (Patient not taking: Reported on 5/29/2018) 3 Package 0     ketoconazole (NIZORAL) 2 % cream Apply topically daily On hold 60 g 3     loratadine (CLARITIN) 10 MG tablet Take 10 mg by mouth as needed        metoprolol tartrate (LOPRESSOR) 25 MG tablet Take 1 tablet (25 mg) by mouth 2 times daily 180 tablet 3     mirtazapine (REMERON) 15 MG tablet Take 15 mg by mouth At Bedtime.       Multiple Vitamins-Minerals (CENTRUM SILVER) per tablet Take 1 tablet by mouth daily. AM        order for DME 20-30 mm Hg knee length compression stockings.  Measure and fit.  Style and color per patient preference.  Doff n Aracelis per patient need. 1 Units 0     sertraline (ZOLOFT) 100 MG tablet Take 100 mg by mouth every evening.       triamcinolone (KENALOG) 0.1 % cream Apply topically 2 times daily For up to two weeks in a row 80 g 3     warfarin (COUMADIN) 5 MG tablet Take 1 tablet by mouth daily or as directed by the Coumadin clinic. 90 tablet 3     warfarin (COUMADIN) 5 MG tablet 7.5 mg on Wed; 5 mg all other days  or as instructed by coumadin clinic. (Patient taking differently: 5 mg all days  or as instructed by coumadin clinic.) 35 tablet 5       Allergies:  The patientis allergic to latex.    Social history:  Social History   Substance Use Topics     Smoking status: Former Smoker     Types: Cigarettes, Cigars     Quit date: 1/1/1968     Smokeless tobacco: Never Used     Alcohol use 0.0  oz/week     0 Standard drinks or equivalent per week      Comment: 2-3 drinks twice weekly     Marital status: .    Review of Systems:  There are no exam notes on file for this visit.

## 2018-11-14 NOTE — NURSING NOTE
"Chief Complaint   Patient presents with     Consult     Hepatic Flexure Mass.     Referral     Per Dr. Esteban.       Vitals:    11/14/18 1534   BP: 132/80   BP Location: Left arm   Patient Position: Sitting   Cuff Size: Adult Regular   Pulse: 117   Temp: 97.6  F (36.4  C)   TempSrc: Oral   SpO2: 99%   Weight: 146 lb 4.8 oz   Height: 5' 7.99\"       Body mass index is 22.25 kg/(m^2).      Baldev Mcmahon, EMT                      "

## 2018-11-14 NOTE — PROGRESS NOTES
ANTICOAGULATION FOLLOW-UP CLINIC VISIT    Patient Name:  Noe Florence  Date:  11/14/2018  Contact Type:  Telephone    SUBJECTIVE:     Patient Findings     Positives Intentional hold of therapy (11/2/18 - 11/6/18 before colonoscopy)    Comments Spoke with Rica.  She reports Sha has not had any changes in medications.  He has been eating more protein.             OBJECTIVE    INR   Date Value Ref Range Status   11/13/2018 1.66 (H) 0.86 - 1.14 Final       ASSESSMENT / PLAN  INR assessment SUB    Recheck INR In: 1 WEEK    INR Location Clinic      Anticoagulation Summary as of 11/14/2018     INR goal 2.0-3.0   Today's INR 1.66! (11/13/2018)   Warfarin maintenance plan 5 mg (5 mg x 1) every day   Full warfarin instructions 5 mg every day   Weekly warfarin total 35 mg   No change documented Danya Edwards RN   Plan last modified Nguyen Zuniga RN (12/21/2017)   Next INR check 11/21/2018   Priority INR   Target end date Indefinite    Indications   Atrial fibrillation (H) [I48.91] [I48.91]  Long-term (current) use of anticoagulants [Z79.01] [Z79.01]         Anticoagulation Episode Summary     INR check location     Preferred lab     Send INR reminders to [facility]    Comments Patient contact number: [phone]   Can leave results with Rica (friend)      Anticoagulation Care Providers     Provider Role Specialty Phone number    Deedee Baird MD Responsible Cardiology [phone]            See the Encounter Report to view Anticoagulation Flowsheet and Dosing Calendar (Go to Encounters tab in chart review, and find the Anticoagulation Therapy Visit)    Spoke with Rica. She reports Sha has not had any changes in medications.  He has been eating more protein.  His INR was stable on warfarin 5 mg daily before colonoscopy, will continue this dose and recheck INR in one week.    Danya Edwards, BYRON

## 2018-11-15 NOTE — ADDENDUM NOTE
Encounter addended by: Sofi Zelaya on: 11/15/2018 11:59 AM<BR>     Actions taken: Order Reconciliation Section accessed, Visit diagnoses modified, Diagnosis association updated,  activity accessed

## 2018-11-20 ENCOUNTER — RADIANT APPOINTMENT (OUTPATIENT)
Dept: ULTRASOUND IMAGING | Facility: CLINIC | Age: 83
End: 2018-11-20
Attending: INTERNAL MEDICINE
Payer: COMMERCIAL

## 2018-11-20 DIAGNOSIS — R09.89 BRUIT OF LEFT CAROTID ARTERY: ICD-10-CM

## 2018-11-20 DIAGNOSIS — I73.9 PVD (PERIPHERAL VASCULAR DISEASE) (H): ICD-10-CM

## 2018-11-21 LAB — COPATH REPORT: NORMAL

## 2018-11-23 DIAGNOSIS — I48.0 PAROXYSMAL ATRIAL FIBRILLATION (H): ICD-10-CM

## 2018-11-23 DIAGNOSIS — Z79.01 LONG TERM CURRENT USE OF ANTICOAGULANT THERAPY: ICD-10-CM

## 2018-11-23 LAB — INR PPP: 2.51 (ref 0.86–1.14)

## 2018-11-26 ENCOUNTER — ANTICOAGULATION THERAPY VISIT (OUTPATIENT)
Dept: ANTICOAGULATION | Facility: CLINIC | Age: 83
End: 2018-11-26

## 2018-11-26 DIAGNOSIS — I48.0 PAROXYSMAL ATRIAL FIBRILLATION (H): ICD-10-CM

## 2018-11-26 NOTE — PROGRESS NOTES
ANTICOAGULATION FOLLOW-UP CLINIC VISIT    Patient Name:  Noe Florence  Date:  11/26/2018  Contact Type:  Telephone    SUBJECTIVE:     Patient Findings     Comments Per Epic, Sha will be scheduling surgery. On message left, asked that he let the [facility] know when this is scheduled.             OBJECTIVE    INR   Date Value Ref Range Status   11/23/2018 2.51 (H) 0.86 - 1.14 Final       ASSESSMENT / PLAN  INR assessment THER    Recheck INR In: 4 WEEKS    INR Location Clinic      Anticoagulation Summary as of 11/26/2018     INR goal 2.0-3.0   Today's INR 2.51 (11/23/2018)   Warfarin maintenance plan 5 mg (5 mg x 1) every day   Full warfarin instructions 5 mg every day   Weekly warfarin total 35 mg   No change documented Danya Edwards RN   Plan last modified Nguyen Zuniga RN (12/21/2017)   Next INR check 12/21/2018   Priority INR   Target end date Indefinite    Indications   Atrial fibrillation (H) [I48.91] [I48.91]  Long-term (current) use of anticoagulants [Z79.01] [Z79.01]         Anticoagulation Episode Summary     INR check location     Preferred lab     Send INR reminders to [facility]    Comments Patient contact number: [phone]   Can leave results with Rica (friend)      Anticoagulation Care Providers     Provider Role Specialty Phone number    Deedee Baird MD Responsible Cardiology [phone]            See the Encounter Report to view Anticoagulation Flowsheet and Dosing Calendar (Go to Encounters tab in chart review, and find the Anticoagulation Therapy Visit)    Left message for patient with results and dosing recommendations. Asked patient to call back to report any missed doses, falls, signs and symptoms of bleeding or clotting, any changes in health, medication, or diet. Asked patient to call back with any questions or concerns.    Per [facility], Sha will be scheduling surgery. On message left, asked that he let the [facility] know when this is scheduled.      Danya Edwards, BYRON

## 2018-11-26 NOTE — MR AVS SNAPSHOT
Noe Florence   11/26/2018   Anticoagulation Therapy Visit    Description:  83 year old male   Provider:  Danya Edwards, RN   Department:  [facility]           INR as of 11/26/2018     Today's INR 2.51 (11/23/2018)      Anticoagulation Summary as of 11/26/2018     INR goal 2.0-3.0   Today's INR 2.51 (11/23/2018)   Full warfarin instructions 5 mg every day   Next INR check 12/21/2018    Indications   Atrial fibrillation (H) [I48.91] [I48.91]  Long-term (current) use of anticoagulants [Z79.01] [Z79.01]         November 2018 Details    Sun Mon Tue Wed Thu Fri Sat         1               2               3                 4               5               6               7               8               9               10                 11               12               13               14               15               16               17                 18               19               20               21               22               23               24                 25               26      5 mg   See details      27      5 mg         28      5 mg         29      5 mg         30      5 mg           Date Details   11/26 This INR check               How to take your warfarin dose     To take:  5 mg Take 1 of the 5 mg tablets.           December 2018 Details    Sun Mon Tue Wed Thu Fri Sat           1      5 mg           2      5 mg         3      5 mg         4      5 mg         5      5 mg         6      5 mg         7      5 mg         8      5 mg           9      5 mg         10      5 mg         11      5 mg         12      5 mg         13      5 mg         14      5 mg         15      5 mg           16      5 mg         17      5 mg         18      5 mg         19      5 mg         20      5 mg         21            22                 23               24               25               26               27               28               29                 30               31                      Date Details   No additional details    Date of next INR:  12/21/2018         How to take your warfarin dose     To take:  5 mg Take 1 of the 5 mg tablets.

## 2018-11-27 ENCOUNTER — OFFICE VISIT (OUTPATIENT)
Dept: CARDIOLOGY | Facility: CLINIC | Age: 83
End: 2018-11-27
Attending: NURSE PRACTITIONER
Payer: COMMERCIAL

## 2018-11-27 VITALS
WEIGHT: 151.5 LBS | OXYGEN SATURATION: 100 % | DIASTOLIC BLOOD PRESSURE: 80 MMHG | HEART RATE: 110 BPM | BODY MASS INDEX: 24.35 KG/M2 | HEIGHT: 66 IN | SYSTOLIC BLOOD PRESSURE: 127 MMHG

## 2018-11-27 DIAGNOSIS — R00.0 TACHYCARDIA: Primary | ICD-10-CM

## 2018-11-27 DIAGNOSIS — R09.89 BRUIT OF LEFT CAROTID ARTERY: ICD-10-CM

## 2018-11-27 DIAGNOSIS — I73.9 PVD (PERIPHERAL VASCULAR DISEASE) (H): ICD-10-CM

## 2018-11-27 PROCEDURE — 93005 ELECTROCARDIOGRAM TRACING: CPT | Mod: ZF

## 2018-11-27 PROCEDURE — 99215 OFFICE O/P EST HI 40 MIN: CPT | Mod: GC | Performed by: INTERNAL MEDICINE

## 2018-11-27 PROCEDURE — G0463 HOSPITAL OUTPT CLINIC VISIT: HCPCS | Mod: 25,ZF

## 2018-11-27 PROCEDURE — 93010 ELECTROCARDIOGRAM REPORT: CPT | Mod: ZP | Performed by: INTERNAL MEDICINE

## 2018-11-27 ASSESSMENT — PAIN SCALES - GENERAL: PAINLEVEL: NO PAIN (0)

## 2018-11-27 NOTE — MR AVS SNAPSHOT
After Visit Summary   11/27/2018    Noe Florence    MRN: [id number]           Patient Information     Date Of Birth          1935        Visit Information        Provider Department      11/27/2018 1:30 PM Frida Desai MD [facility]        Today's Diagnoses     Tachycardia    -  1    PVD (peripheral vascular disease) (H)        Bruit of left carotid artery          Care Instructions    You were seen today in the Cardiovascular Clinic at the [facility].      Cardiology Providers you saw during your visit:   Dr. Desai    Diagnosis:   (R00.0) Tachycardia  (primary encounter diagnosis)    (I73.9) PVD (peripheral vascular disease) (H)    (R09.89) Bruit of left carotid artery    Results:  EKG reviewed.    Recommendations:   Continue current medications       Follow-up:  6 months with Dr. Deasi      For emergencies call 911.    For any scheduling needs, please call [phone].     Thank you for your visit today!     Please call if you have any questions or concerns.  Juanito Woodard RN              Follow-ups after your visit        Additional Services     Follow-Up with Vascular Cardiologist                 Your next 10 appointments already scheduled     Dec 03, 2018 10:30 AM CST   (Arrive by 10:15 AM)   Implanted Defibulator with Uc Cv Device 1   [facility] ([facility])    [address]  3rd Floor  [address]               Dec 03, 2018  1:30 PM CST   (Arrive by 1:15 PM)   RETURN ARRHYTHMIA with Deedee Baird MD   [facility] ([facility])    [address]  [address]  [facility] [address] [phone]            Jan 07, 2019 10:00 AM CST   (Arrive by 9:45 AM)   Return Visit with Roberto Sarmiento MD   [facility] ([facility])    [address]  4th Floor  [facility] [facility] [id number] [phone]            Jan 08, 2019 11:00 AM  CST   [facility] Draw with  [facility] LAB DRAW   [facility] ([facility])    [address]  [address]  [facility] [address]   [phone]            Jan 08, 2019 12:30 PM CST   Return Neuropathy Visit with Jase Duarte MD   [facility] ([facility])    [address]  [address]  [place] [facility] [address]   [phone]            Apr 02, 2019 10:15 AM CDT   LAB with  LAB   [facility] ([facility])    [address]  1st Floor  [place] [place] [facility] [address]   [phone]           Please do not eat 10-12 hours before your appointment if you are coming in fasting for labs on lipids, cholesterol, or glucose (sugar). This does not apply to pregnant women. Water, hot tea and black coffee (with nothing added) are okay. Do not drink other fluids, diet soda or chew gum.            Apr 02, 2019 11:00 AM CDT   CT CHEST/ABDOMEN/PELVIS W CONTRAST with UCCT2   [facility] CT ([facility])    [address]  1st Floor  [place] [facility] [address]   [phone]           How do I prepare for my exam? (Food and drink instructions) To prepare: Do not eat or drink for 2 hours before your exam. If you need to take medicine, you may take it with small sips of water. (We may ask you to take liquid medicine as well.)  How do I prepare for my exam? (Other instructions) Please arrive 30 minutes early for your CT.  Once in the department you might be asked to drink water 15-20 minutes prior to your exam.  If indicated you may be asked to drink an oral contrast in advance of your CT.  If this is the case, the imaging team will let you know or be in contact with you prior to your appointment  Patients over 70 or patients with diabetes or kidney problems: If you haven t had a blood test (creatinine test) within the last 30 days, the Cardiologist/Radiologist may require you to get this test  prior to your exam.  If you have diabetes:  Continue to take your metformin medication on the day of your exam  What should I wear: Please wear loose clothing, such as a sweat suit or jogging clothes. Avoid snaps, zippers and other metal. We may ask you to undress and put on a hospital gown.  How long does the exam take: Most scans take less than 20 minutes.  What should I bring: Please bring any scans or X-rays taken at other hospitals, if similar tests were done. Also bring a list of your medicines, including vitamins, minerals and over-the-counter drugs. It is safest to leave personal items at home.  Do I need a : No  is needed.  What do I need to tell my doctor? Be sure to tell your doctor: * If you have any allergies. * If there s any chance you are pregnant. * If you are breastfeeding.  What should I do after the exam: No restrictions, You may resume normal activities.  What is this test: A CT (computed tomography) scan is a series of pictures that allows us to look inside your body. The scanner creates images of the body in cross sections, much like slices of bread. This helps us see any problems more clearly. You may receive contrast (X-ray dye) before or during your scan. You will be asked to drink the contrast.  Who should I call with questions: If you have any questions, please call the Imaging Department where you will have your exam. Directions, parking instructions, and other information is available on our website, [email or web address].            Apr 08, 2019 10:45 AM CDT   (Arrive by 10:30 AM)   Return Visit with Francisco Gilliam MD   [facility] ([facility])    [address] [place] [address]  [address] [place] [address]   [phone]            May 28, 2019  3:00 PM CDT   (Arrive by 2:45 PM)   Return Vascular Visit with Frida Desai MD   [facility])    [address] [place] [address]  [address]  [facility] [id number] [phone]              Future tests that were ordered for you today     Open Future Orders        Priority Expected Expires Ordered    Follow-Up with Vascular Cardiologist Routine 5/26/2019 11/27/2019 11/27/2018            Who to contact     If you have questions or need follow up information about today's clinic visit or your schedule please contact [facility] directly at [phone].  Normal or non-critical lab and imaging results will be communicated to you by [email or web address], letter or phone within 4 business days after the clinic has received the results. If you do not hear from us within 7 days, please contact the clinic through Payfirma or phone. If you have a critical or abnormal lab result, we will notify you by phone as soon as possible.  Submit refill requests through Payfirma or call your pharmacy and they will forward the refill request to us. Please allow 3 business days for your refill to be completed.          Additional Information About Your Visit        Payfirma Information     [employer] gives you secure access to your electronic health record. If you see a primary care provider, you can also send messages to your care team and make appointments. If you have questions, please call your primary care clinic.  If you do not have a primary care provider, please call [phone] and they will assist you.        Care EveryWhere ID     This is your Care EveryWhere ID. This could be used by other organizations to access your [place] medical records  [id number]        Your Vitals Were     Pulse Height Pulse Oximetry BMI (Body Mass Index)          110 1.676 m (5' 6\") 100% 24.45 kg/m2         Blood Pressure from Last 3 Encounters:   11/27/18 127/80   11/14/18 132/80   11/07/18 122/61    Weight from Last 3 Encounters:   11/27/18 68.7 kg (151 lb 8 oz)   11/14/18 66.4 kg (146 lb 4.8 oz)   11/07/18 63.5 kg (140 lb)              We Performed the Following     EKG 12-lead, " tracing only (Same Day)     Follow-Up with Cardiologist     Follow-Up with Vascular Cardiologist          Today's Medication Changes          These changes are accurate as of 11/27/18  2:41 PM.  If you have any questions, ask your nurse or doctor.               These medicines have changed or have updated prescriptions.        Dose/Directions    * warfarin 5 MG tablet   Commonly known as:  COUMADIN   This may have changed:  additional instructions   Used for:  Atrial flutter (H)        7.5 mg on Wed; 5 mg all other days  or as instructed by coumadin clinic.   Quantity:  35 tablet   Refills:  5       * warfarin 5 MG tablet   Commonly known as:  COUMADIN   This may have changed:  Another medication with the same name was changed. Make sure you understand how and when to take each.   Used for:  Paroxysmal atrial fibrillation (H), Long-term (current) use of anticoagulants        Take 1 tablet by mouth daily or as directed by the Coumadin clinic.   Quantity:  90 tablet   Refills:  3       * Notice:  This list has 2 medication(s) that are the same as other medications prescribed for you. Read the directions carefully, and ask your doctor or other care provider to review them with you.             Primary Care Provider Office Phone # Fax #    Roberto Sarmiento [phone] [address] [facility] [address]        Equal Access to Services     LEE CASTILLO : Delilah salinaso Solali, wakerryda faustino, qaybta kaalmada adekimda, sil minor. So [facility] [phone].    ATENCIÓN: Si habla español, tiene a aguirre disposición servicios gratuitos de asistencia lingüística. Llame al [phone].    We comply with applicable federal civil rights laws and [place] laws. We do not discriminate on the basis of race, color, national origin, age, disability, sex, sexual orientation, or gender identity.            Thank you!     Thank you for choosing [facility]  for  your care. Our goal is always to provide you with excellent care. Hearing back from our patients is one way we can continue to improve our services. Please take a few minutes to complete the written survey that you may receive in the mail after your visit with us. Thank you!             Your Updated Medication List - Protect others around you: Learn how to safely use, store and throw away your medicines at www.disposemymeds.org.          This list is accurate as of 11/27/18  2:41 PM.  Always use your most recent med list.                   Brand Name Dispense Instructions for use Diagnosis    aspirin 81 MG tablet    ASA     Take 1 tablet by mouth daily.        atorvastatin 40 MG tablet    LIPITOR    100 tablet    Take 1 tablet (40 mg) by mouth daily    Hyperlipidemia, unspecified hyperlipidemia type       bacitracin 500 UNIT/GM external ointment     28 g    Apply topically 2 times daily APPLY TO LEFT LEG TWO TIMES PER DAY    Left leg cellulitis       Blood Pressure Monitor Kit     1 kit    1 Units daily    Hypertension       ciclopirox 0.77 % cream    LOPROX    90 g    Apply topically 2 times daily To feet and toenails.    Dermatophytosis of nail, Tinea pedis of both feet       doxazosin 2 MG tablet    CARDURA    90 tablet    Take 1/2 tab in the morning and 1/2 tab in the evening    Essential hypertension       fluticasone 50 MCG/ACT nasal spray    FLONASE    3 Package    Spray 2 sprays into both nostrils daily    Unspecified sinusitis (chronic)       ketoconazole 2 % external cream    NIZORAL    60 g    Apply topically daily On hold    Tinea corporis       loratadine 10 MG tablet    CLARITIN     Take 10 mg by mouth as needed        metoprolol tartrate 25 MG tablet    LOPRESSOR    180 tablet    Take 1 tablet (25 mg) by mouth 2 times daily    Coronary artery disease involving native heart without angina pectoris, unspecified vessel or lesion type       mirtazapine 15 MG tablet    REMERON     Take 15 mg by mouth At  Bedtime.        multivitamin tablet      Take 1 tablet by mouth daily. AM        order for DME     1 Units    20-30 mm Hg knee length compression stockings.  Measure and fit.  Style and color per patient preference.  Pat Hsu per patient need.    PVD (peripheral vascular disease) (H), Ulcer of left lower extremity, limited to breakdown of skin (H)       triamcinolone 0.1 % external cream    KENALOG    80 g    Apply topically 2 times daily For up to two weeks in a row    Atopic eczema       vitamin B-12 1000 MCG tablet    CYANOCOBALAMIN    180 tablet    Take 2 tablets (2,000 mcg) by mouth daily    Anemia       VITAMIN D-3 PO      Take 1 tablet by mouth 2 times daily        * warfarin 5 MG tablet    COUMADIN    35 tablet    7.5 mg on Wed; 5 mg all other days  or as instructed by coumadin clinic.    Atrial flutter (H)       * warfarin 5 MG tablet    COUMADIN    90 tablet    Take 1 tablet by mouth daily or as directed by the Coumadin clinic.    Paroxysmal atrial fibrillation (H), Long-term (current) use of anticoagulants       ZOLOFT 100 MG tablet   Generic drug:  sertraline      Take 100 mg by mouth every evening.        * Notice:  This list has 2 medication(s) that are the same as other medications prescribed for you. Read the directions carefully, and ask your doctor or other care provider to review them with you.

## 2018-11-27 NOTE — NURSING NOTE
Med Reconcile: Reviewed and verified all current medications with the patient. The updated medication list was printed and given to the patient.  Return Appointment: 6 months with Dr. Desai.  Patient given instructions regarding scheduling next clinic visit. Patient demonstrated understanding of this information and agreed to call with further questions or concerns.  Patient stated he understood all health information given and agreed to call with further questions or concerns.

## 2018-11-27 NOTE — PROGRESS NOTES
Vascular Cardiology Consultation Follow Up      HPI:     This is an 82 year old male with PMH AFIB, CKD, CAD s/p CABG x 2 and JEREMY x 2 (with Dr. Kenyon), HTN, HLD, iCMY, AFLutter s/p ablation, VT, and CVI and DVT here for evaluation of his vascular disease and follow up of CAD, iCMY. He is here with his pleasant wife providing some history.    Prior History:  Patient is a long standing , who played vigorously up until 80 years old from a young age. Only when he stopped playing tennis did he discover progressive swelling. He was prescribed compression stockings (20-30 mmHg) however these were too large for him and would not stay up appropriately. His swelling was off and on for 2 years which is well treated with compression stockings. Had an open wound on the left shin which is now well healed. Also has neuropathy in leg with some skin tenderness and redness. He has hammer toes with associated pain; followed by podiatry.    Interval History:  Mr. Florence is feeling well today. He has not returned to playing tennis but does go for walks when he and his wife run errands. He is limited by arthritis and foot pain. No claudication, chest pain, shortness of breath, palpitations, lightheadedness, orthopnea, PND. He has occasional, mild swelling in his left LE depending on his activity for the day, but overall the swelling is well controlled with his compression stockings.    He was recently diagnosed with colon cancer with plan for resection.    Heart rate in clinic today is in the 100s.   ECG shows likely underlying afib or flutter with ventricular paced rate of 105. He has not noticed any symptoms related to this HR.     PAST MEDICAL HISTORY  Past Medical History:   Diagnosis Date     Advanced open-angle glaucoma      Antiplatelet or antithrombotic long-term use      Atrial fibrillation (H)      CKD (chronic kidney disease), stage III (H) 2005     Colon cancer (H)     Stage II-B colon cancer     Coronary  artery disease     s/p CABG x 2, JEREMY x 2     Diverticulitis      Hyperlipidemia      Hypertension      Ischemic cardiomyopathy      MGUS (monoclonal gammopathy of unknown significance)      Nonsenile cataract     BE     Pacemaker      Polymorphic ventricular tachycardia (H)      Polymyalgia rheumatica (H)      PVD (posterior vitreous detachment), both eyes 2005     S/P ablation of atrial flutter 6/20/14    CTI     Stented coronary artery      SVT (supraventricular tachycardia) (H)     PPM/AICD for NSVT     Upper leg DVT (deep venous thromboembolism), chronic (H)     Left     Weight loss, unintentional 2017    15 lb in 4 months       CURRENT MEDICATIONS  Current Outpatient Prescriptions   Medication Sig Dispense Refill     aspirin 81 MG tablet Take 1 tablet by mouth daily.       atorvastatin (LIPITOR) 40 MG tablet Take 1 tablet (40 mg) by mouth daily 100 tablet 3     bacitracin ointment Apply topically 2 times daily APPLY TO LEFT LEG TWO TIMES PER DAY 28 g 0     Cholecalciferol (VITAMIN D-3 PO) Take 1 tablet by mouth 2 times daily       ciclopirox (LOPROX) 0.77 % cream Apply topically 2 times daily To feet and toenails. 90 g 6     cyanocobalamin (VITAMIN  B-12) 1000 MCG tablet Take 2 tablets (2,000 mcg) by mouth daily 180 tablet 3     doxazosin (CARDURA) 2 MG tablet Take 1/2 tab in the morning and 1/2 tab in the evening 90 tablet 3     ketoconazole (NIZORAL) 2 % cream Apply topically daily On hold 60 g 3     loratadine (CLARITIN) 10 MG tablet Take 10 mg by mouth as needed        metoprolol tartrate (LOPRESSOR) 25 MG tablet Take 1 tablet (25 mg) by mouth 2 times daily 180 tablet 3     mirtazapine (REMERON) 15 MG tablet Take 15 mg by mouth At Bedtime.       Multiple Vitamins-Minerals (CENTRUM SILVER) per tablet Take 1 tablet by mouth daily. AM        order for DME 20-30 mm Hg knee length compression stockings.  Measure and fit.  Style and color per patient preference.  Pat Hsu per patient need. 1 Units 0      sertraline (ZOLOFT) 100 MG tablet Take 100 mg by mouth every evening.       triamcinolone (KENALOG) 0.1 % cream Apply topically 2 times daily For up to two weeks in a row 80 g 3     warfarin (COUMADIN) 5 MG tablet Take 1 tablet by mouth daily or as directed by the Coumadin clinic. 90 tablet 3     warfarin (COUMADIN) 5 MG tablet 7.5 mg on Wed; 5 mg all other days  or as instructed by coumadin clinic. (Patient taking differently: 5 mg all days  or as instructed by coumadin clinic.) 35 tablet 5     Blood Pressure Monitor KIT 1 Units daily (Patient not taking: Reported on 8/2/2018) 1 kit 0     fluticasone (FLONASE) 50 MCG/ACT nasal spray Spray 2 sprays into both nostrils daily (Patient not taking: Reported on 5/29/2018) 3 Package 0       PAST SURGICAL HISTORY:  Past Surgical History:   Procedure Laterality Date     C CABG, ARTERY-VEIN, FOUR  2006    LIMA-LAD, SVG-Rt PDA, SVG-OM2, SVG-Diag 1     C CABG, VEIN, SINGLE  1994    1-vessel CABG, SVG->PDRCA      CATARACT IOL, RT/LT Bilateral      COLONOSCOPY  3/13/2014    Procedure: COMBINED COLONOSCOPY, SINGLE BIOPSY/POLYPECTOMY BY BIOPSY;;  Surgeon: Mary Gerber MD;  Location:  GI     COLONOSCOPY N/A 6/22/2015    Procedure: COLONOSCOPY;  Surgeon: Marilin Newman MD;  Location:  GI     COLONOSCOPY N/A 11/7/2018    Procedure: COMBINED COLONOSCOPY, SINGLE OR MULTIPLE BIOPSY/POLYPECTOMY BY BIOPSY;  Surgeon: Roberto Esteban MD;  Location:  GI     H ABLATION ATRIAL FLUTTER       HEART CATH DRUG ELUTING STENT PLACEMENT  4/19/2012    JEREMY x 2 to LCx     IMPLANT IMPLANTABLE CARDIOVERTER DEFIBRILLATOR  5-    ppm/aicd     KNEE SURGERY      right and left knee surgeries     LAPAROSCOPIC ASSISTED COLECTOMY LEFT (DESCENDING)  4/8/2014    Procedure: LAPAROSCOPIC ASSISTED COLECTOMY LEFT (DESCENDING);  Hand assisted Laparoscopic Sigmoid Colectomy , Laparoscopic mobilization of spleenic flexure *Latex Allergy*Anesthesia General with Block;  Surgeon:  "Chanelle Guzmán MD;  Location: [facility] OR     REPAIR VALVE MITRAL  2006     30-mm Medtronic Julian ring      SELECTIVE LASER TRABECULOPLASTY (SLT) OD (RIGHT EYE)  4/10, 1/12,+1/9/13    RE     SELECTIVE LASER TRABECULOPLASTY (SLT) OS (LEFT EYE)  5/2012     SHOULDER SURGERY      right rotator cuff       ALLERGIES     Allergies   Allergen Reactions     Latex Rash     Rash       FAMILY HISTORY  Family History   Problem Relation Age of Onset     C.A.D. Father      Anesthesia Reaction No family hx of      Crohn Disease No family hx of      Ulcerative Colitis No family hx of      Cancer - colorectal No family hx of      Macular Degeneration No family hx of      Cancer No family hx of      No known family hx of skin cancer     Melanoma No family hx of      Skin Cancer No family hx of          SOCIAL HISTORY  Social History     Social History     Marital status:      Spouse name: N/A     Number of children: N/A     Years of education: N/A     Occupational History     Not on file.     Social History Main Topics     Smoking status: Former Smoker     Types: Cigarettes, Cigars     Quit date: 1/1/1968     Smokeless tobacco: Never Used     Alcohol use 0.0 oz/week     0 Standard drinks or equivalent per week      Comment: 2-3 drinks twice weekly     Drug use: No     Sexual activity: Not on file     Other Topics Concern     Not on file     Social History Narrative       ROS:   A 10 point ROS is negative except as noted above.     EXAM:  /80 (BP Location: Right arm, Patient Position: Chair, Cuff Size: Adult Small)  Pulse 110  Ht 1.676 m (5' 6\")  Wt 68.7 kg (151 lb 8 oz)  SpO2 100%  BMI 24.45 kg/m2  In general, the patient is a pleasant male in no apparent distress.    HEENT: NC/AT.  EOMI.  Sclerae white, not injected.  Nares clear.  Pharynx without erythema or exudate.   Neck: No adenopathy.  No thyromegaly. Carotids +2/2 bilaterally.  No jugular venous distension.   Heart: RRR. Normal S1, S2. 2/6 " holosystolic murmur at the left sternal border.  Lungs: CTA. No crackles  Abdomen: Soft, nontender, nondistended. NABS  Extremities: No clubbing, cyanosis. 1+ edema on left LE, stasis dermatitis. Well healed wound over left shin  Vascular:  1+ DP and PT bilaterally    Labs:  LIPID RESULTS:  Lab Results   Component Value Date    CHOL 119 01/15/2018    HDL 58 01/15/2018    LDL 46 01/15/2018    TRIG 76 01/15/2018    CHOLHDLRATIO 3.2 09/16/2015    NHDL 61 01/15/2018       LIVER ENZYME RESULTS:  Lab Results   Component Value Date    AST 16 11/13/2018    ALT 20 11/13/2018       CBC RESULTS:  Lab Results   Component Value Date    WBC 6.7 11/13/2018    RBC 3.18 (L) 11/13/2018    HGB 9.4 (L) 11/13/2018    HCT 30.4 (L) 11/13/2018    MCV 96 11/13/2018    MCH 29.6 11/13/2018    MCHC 30.9 (L) 11/13/2018    RDW 14.7 11/13/2018     11/13/2018       BMP RESULTS:  Lab Results   Component Value Date     11/13/2018    POTASSIUM 4.3 11/13/2018    CHLORIDE 112 (H) 11/13/2018    CO2 27 11/13/2018    ANIONGAP 4 11/13/2018    GLC 96 11/13/2018    BUN 48 (H) 11/13/2018    CR 1.27 (H) 11/13/2018    GFRESTIMATED 54 (L) 11/13/2018    GFRESTBLACK 65 11/13/2018    KEITH 8.7 11/13/2018        A1C RESULTS:  Lab Results   Component Value Date    A1C 5.7 02/27/2012       Procedures:  11/20/18 Carotid us  Impression:     1. Right side:         Degree of stenosis: Less than 50 % with predominantly echogenic  irregular plaque. No elevated velocities in the internal carotid  artery. Elevated velocity in the external carotid artery 302 cm/s  which may explain bruit on physical exam.     2. Left side:          Degree of stenosis: Less than 50 % with predominantly echogenic  irregular plaque. No elevated velocities the internal carotid artery.  Mildly elevated velocities in the external carotid artery which may  explain the patient's left bruit.     3. Antegrade flow in the vertebral arteries.    Impression:  1. Right leg: Incompetence of right  common femoral vein, residual  right great saphenous vein in the proximal and mid thigh and  incompetent right saphenofemoral junction. The great saphenous vein  dimensions in this location may be from 2 to 6 mm. Incompetent   vein measuring 4 mm in the leg four centimeter from the  medial malleolus, which connects to a competent tributary of the great  saphenous vein. No varicose veins. Incidental right popliteal cyst.     2. Left leg: Incompetence of the common femoral vein, femoral vein in  the distal thigh and popliteal vein. Incompetent saphenofemoral  junction, greater saphenous vein in the proximal calf. The diameter of  the greater saphenous vein about the SFJ varies from 4.9 to 7.2 mm and  3 to 4.4 mm in the region of the knee/proximal calf. 2 incompetent  perforators in the lower leg, measuring up to 2.7 and 2.3 cm. No  varicose veins.     3. Please see detailed assessment of arterial vasculature as per  report 11/13/2017.      Duplex  1. Right leg: No evidence of hemodynamically significant stenosis.  2. Left leg: Mildly elevated velocities in the mid SFA up to 211 cm/s,  indicating near 50% stenosis with biphasic distal waveforms. Abnormal  waveforms in the CFA are likely related to venous contamination given  lack of significant disease on the comparison CT from 9/26/17, and  relatively normal distal waveforms.       ABIs  Findings:  Right:    Arm: 115 mmHg   PT at ankle: 177 mmHg   DP at foot: 170 mmHg   DELFINO: 150   TBI: 1.56  Left:   Arm: 118 mmHg   PT at ankle: >254 mmHg   DP at foot: >254 mmHg   DELFINO: Noncompressible   TBI: 1.50  Impression:   Right leg: Resting DELFINO is 1.50, abnormal, >1.40 (Inconclusive  severity). This is likely related to calcified vessels as noted on  prior radiographs.  Left leg: Resting DELFINO is abnormal, >1.40 (Inconclusive severity). This  is likely related to calcified vessels as noted on prior radiographs.      Assessment and Plan:     #AFIB: in afib or flutter in  clinic with ventricular paced rate of 105, asymptomatic  - continue metoprolol, coumadin  - has follow up with EP next week; sent his device transmission in today  - will message EP to follow up results of the interrogation to see if any settings need to be changed to reduce rapid ventricular pacing    #CAD - no symptoms, s/p CABG and PCI x 2, continue on aspirin.     #DVT   - on longterm warfarin, well controlled INR. No bleeding/bruising.  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 Swelling is now well controlled with compression stockings.     #Device - due for battery change soon, f/u EP as scheduled.    #PAD - mild left SFA disease, no claudication; mild carotid artery disease. Continue medical management; repeat carotid us in 2-3 years    It was a pleasure to see Mr. Florence in clinic today with Dr. Desai.  Please do not hesitate to call with any further questions or concerns.     Wyatt Mejias MD  Cardiovascular Fellow, PGY-7  [phone]    ATTENDING ATTESTATION    Patient was seen and evaluated in clinic today with the cardiology fellow. The note has been edited to reflect the history taking performed by me, my personal review of imaging, EKGs, labs and procedures, and our joint assessment and plan.     Total time spent 60 minutes, of which >50% was spent in face-to-face patient evaluation, reviewing data with patient, prolonged counseling of lifestyle modifications, any necessary genetic testing and screening of family members, and coordination of care.       Frida Desia MD MSc    Division of Cardiology  Vascular Section  [facility]

## 2018-11-27 NOTE — NURSING NOTE
Chief Complaint   Patient presents with     Follow Up For     reason for visit: 81 y/o male for 6 month f/u of venous stasis and venous insufficiency. Has been in compression, as well as PAD.     Vitals were taken and medications were reconciled.    ELIJAH Ulloa  1:36 PM

## 2018-11-27 NOTE — LETTER
11/27/2018      RE: Noe Florence  [address] [facility] [address]       Dear Colleague,    Thank you for the opportunity to participate in the care of your patient, Noe Florence, at the [facility] at [facility]. Please see a copy of my visit note below.        Please do          Vascular Cardiology Consultation Follow Up      HPI:     This is an 82 year old male with PMH AFIB, CKD, CAD s/p CABG x 2 and JEREMY x 2 (with Dr. Kenyon), HTN, HLD, iCMY, AFLutter s/p ablation, VT, and CVI and DVT here for evaluation of his vascular disease and follow up of CAD, iCMY. He is here with his pleasant wife providing some history.    Prior History:  Patient is a long standing , who played vigorously up until 80 years old from a young age. Only when he stopped playing tennis did he discover progressive swelling. He was prescribed compression stockings (20-30 mmHg) however these were too large for him and would not stay up appropriately. His swelling was off and on for 2 years which is well treated with compression stockings. Had an open wound on the left shin which is now well healed. Also has neuropathy in leg with some skin tenderness and redness. He has hammer toes with associated pain; followed by podiatry.    Interval History:  Mr. Florence is feeling well today. He has not returned to playing tennis but does go for walks when he and his wife run errands. He is limited by arthritis and foot pain. No claudication, chest pain, shortness of breath, palpitations, lightheadedness, orthopnea, PND. He has occasional, mild swelling in his left LE depending on his activity for the day, but overall the swelling is well controlled with his compression stockings.    He was recently diagnosed with colon cancer with plan for resection.    Heart rate in clinic today is in the 100s.   ECG shows likely underlying afib or flutter with ventricular paced rate of 105. He has not  noticed any symptoms related to this HR.     PAST MEDICAL HISTORY  Past Medical History:   Diagnosis Date     Advanced open-angle glaucoma      Antiplatelet or antithrombotic long-term use      Atrial fibrillation (H)      CKD (chronic kidney disease), stage III (H) 2005     Colon cancer (H)     Stage II-B colon cancer     Coronary artery disease     s/p CABG x 2, JEREMY x 2     Diverticulitis      Hyperlipidemia      Hypertension      Ischemic cardiomyopathy      MGUS (monoclonal gammopathy of unknown significance)      Nonsenile cataract     BE     Pacemaker      Polymorphic ventricular tachycardia (H)      Polymyalgia rheumatica (H)      PVD (posterior vitreous detachment), both eyes 2005     S/P ablation of atrial flutter 6/20/14    CTI     Stented coronary artery      SVT (supraventricular tachycardia) (H)     PPM/AICD for NSVT     Upper leg DVT (deep venous thromboembolism), chronic (H)     Left     Weight loss, unintentional 2017    15 lb in 4 months       CURRENT MEDICATIONS  Current Outpatient Prescriptions   Medication Sig Dispense Refill     aspirin 81 MG tablet Take 1 tablet by mouth daily.       atorvastatin (LIPITOR) 40 MG tablet Take 1 tablet (40 mg) by mouth daily 100 tablet 3     bacitracin ointment Apply topically 2 times daily APPLY TO LEFT LEG TWO TIMES PER DAY 28 g 0     Cholecalciferol (VITAMIN D-3 PO) Take 1 tablet by mouth 2 times daily       ciclopirox (LOPROX) 0.77 % cream Apply topically 2 times daily To feet and toenails. 90 g 6     cyanocobalamin (VITAMIN  B-12) 1000 MCG tablet Take 2 tablets (2,000 mcg) by mouth daily 180 tablet 3     doxazosin (CARDURA) 2 MG tablet Take 1/2 tab in the morning and 1/2 tab in the evening 90 tablet 3     ketoconazole (NIZORAL) 2 % cream Apply topically daily On hold 60 g 3     loratadine (CLARITIN) 10 MG tablet Take 10 mg by mouth as needed        metoprolol tartrate (LOPRESSOR) 25 MG tablet Take 1 tablet (25 mg) by mouth 2 times daily 180 tablet 3      mirtazapine (REMERON) 15 MG tablet Take 15 mg by mouth At Bedtime.       Multiple Vitamins-Minerals (CENTRUM SILVER) per tablet Take 1 tablet by mouth daily. AM        order for DME 20-30 mm Hg knee length compression stockings.  Measure and fit.  Style and color per patient preference.  Doff n Aracelis per patient need. 1 Units 0     sertraline (ZOLOFT) 100 MG tablet Take 100 mg by mouth every evening.       triamcinolone (KENALOG) 0.1 % cream Apply topically 2 times daily For up to two weeks in a row 80 g 3     warfarin (COUMADIN) 5 MG tablet Take 1 tablet by mouth daily or as directed by the Coumadin clinic. 90 tablet 3     warfarin (COUMADIN) 5 MG tablet 7.5 mg on Wed; 5 mg all other days  or as instructed by coumadin clinic. (Patient taking differently: 5 mg all days  or as instructed by coumadin clinic.) 35 tablet 5     Blood Pressure Monitor KIT 1 Units daily (Patient not taking: Reported on 8/2/2018) 1 kit 0     fluticasone (FLONASE) 50 MCG/ACT nasal spray Spray 2 sprays into both nostrils daily (Patient not taking: Reported on 5/29/2018) 3 Package 0       PAST SURGICAL HISTORY:  Past Surgical History:   Procedure Laterality Date     C CABG, ARTERY-VEIN, FOUR  2006    LIMA-LAD, SVG-Rt PDA, SVG-OM2, SVG-Diag 1     C CABG, VEIN, SINGLE  1994    1-vessel CABG, SVG->PDRCA      CATARACT IOL, RT/LT Bilateral      COLONOSCOPY  3/13/2014    Procedure: COMBINED COLONOSCOPY, SINGLE BIOPSY/POLYPECTOMY BY BIOPSY;;  Surgeon: Mary Gerber MD;  Location:  GI     COLONOSCOPY N/A 6/22/2015    Procedure: COLONOSCOPY;  Surgeon: Marilin Newman MD;  Location:  GI     COLONOSCOPY N/A 11/7/2018    Procedure: COMBINED COLONOSCOPY, SINGLE OR MULTIPLE BIOPSY/POLYPECTOMY BY BIOPSY;  Surgeon: Roberto Esteban MD;  Location:  GI     H ABLATION ATRIAL FLUTTER       HEART CATH DRUG ELUTING STENT PLACEMENT  4/19/2012    JEREMY x 2 to LCx     IMPLANT IMPLANTABLE CARDIOVERTER DEFIBRILLATOR  5-    ppm/aicd      "KNEE SURGERY      right and left knee surgeries     LAPAROSCOPIC ASSISTED COLECTOMY LEFT (DESCENDING)  4/8/2014    Procedure: LAPAROSCOPIC ASSISTED COLECTOMY LEFT (DESCENDING);  Hand assisted Laparoscopic Sigmoid Colectomy , Laparoscopic mobilization of spleenic flexure *Latex Allergy*Anesthesia General with Block;  Surgeon: Chanelle Guzmán MD;  Location: [facility] OR     REPAIR VALVE MITRAL  2006     30-mm Medtronic Julian ring      SELECTIVE LASER TRABECULOPLASTY (SLT) OD (RIGHT EYE)  4/10, 1/12,+1/9/13    RE     SELECTIVE LASER TRABECULOPLASTY (SLT) OS (LEFT EYE)  5/2012     SHOULDER SURGERY      right rotator cuff       ALLERGIES     Allergies   Allergen Reactions     Latex Rash     Rash       FAMILY HISTORY  Family History   Problem Relation Age of Onset     C.A.D. Father      Anesthesia Reaction No family hx of      Crohn Disease No family hx of      Ulcerative Colitis No family hx of      Cancer - colorectal No family hx of      Macular Degeneration No family hx of      Cancer No family hx of      No known family hx of skin cancer     Melanoma No family hx of      Skin Cancer No family hx of          SOCIAL HISTORY  Social History     Social History     Marital status:      Spouse name: N/A     Number of children: N/A     Years of education: N/A     Occupational History     Not on file.     Social History Main Topics     Smoking status: Former Smoker     Types: Cigarettes, Cigars     Quit date: 1/1/1968     Smokeless tobacco: Never Used     Alcohol use 0.0 oz/week     0 Standard drinks or equivalent per week      Comment: 2-3 drinks twice weekly     Drug use: No     Sexual activity: Not on file     Other Topics Concern     Not on file     Social History Narrative       ROS:   A 10 point ROS is negative except as noted above.     EXAM:  /80 (BP Location: Right arm, Patient Position: Chair, Cuff Size: Adult Small)  Pulse 110  Ht 1.676 m (5' 6\")  Wt 68.7 kg (151 lb 8 oz)  SpO2 100%  BMI " 24.45 kg/m2  In general, the patient is a pleasant male in no apparent distress.    HEENT: NC/AT.  EOMI.  Sclerae white, not injected.  Nares clear.  Pharynx without erythema or exudate.   Neck: No adenopathy.  No thyromegaly. Carotids +2/2 bilaterally.  No jugular venous distension.   Heart: RRR. Normal S1, S2. 2/6 holosystolic murmur at the left sternal border.  Lungs: CTA. No crackles  Abdomen: Soft, nontender, nondistended. NABS  Extremities: No clubbing, cyanosis. 1+ edema on left LE, stasis dermatitis. Well healed wound over left shin  Vascular:  1+ DP and PT bilaterally    Labs:  LIPID RESULTS:  Lab Results   Component Value Date    CHOL 119 01/15/2018    HDL 58 01/15/2018    LDL 46 01/15/2018    TRIG 76 01/15/2018    CHOLHDLRATIO 3.2 09/16/2015    NHDL 61 01/15/2018       LIVER ENZYME RESULTS:  Lab Results   Component Value Date    AST 16 11/13/2018    ALT 20 11/13/2018       CBC RESULTS:  Lab Results   Component Value Date    WBC 6.7 11/13/2018    RBC 3.18 (L) 11/13/2018    HGB 9.4 (L) 11/13/2018    HCT 30.4 (L) 11/13/2018    MCV 96 11/13/2018    MCH 29.6 11/13/2018    MCHC 30.9 (L) 11/13/2018    RDW 14.7 11/13/2018     11/13/2018       BMP RESULTS:  Lab Results   Component Value Date     11/13/2018    POTASSIUM 4.3 11/13/2018    CHLORIDE 112 (H) 11/13/2018    CO2 27 11/13/2018    ANIONGAP 4 11/13/2018    GLC 96 11/13/2018    BUN 48 (H) 11/13/2018    CR 1.27 (H) 11/13/2018    GFRESTIMATED 54 (L) 11/13/2018    GFRESTBLACK 65 11/13/2018    KEITH 8.7 11/13/2018        A1C RESULTS:  Lab Results   Component Value Date    A1C 5.7 02/27/2012       Procedures:  11/20/18 Carotid us  Impression:     1. Right side:         Degree of stenosis: Less than 50 % with predominantly echogenic  irregular plaque. No elevated velocities in the internal carotid  artery. Elevated velocity in the external carotid artery 302 cm/s  which may explain bruit on physical exam.     2. Left side:          Degree of stenosis:  Less than 50 % with predominantly echogenic  irregular plaque. No elevated velocities the internal carotid artery.  Mildly elevated velocities in the external carotid artery which may  explain the patient's left bruit.     3. Antegrade flow in the vertebral arteries.    Impression:  1. Right leg: Incompetence of right common femoral vein, residual  right great saphenous vein in the proximal and mid thigh and  incompetent right saphenofemoral junction. The great saphenous vein  dimensions in this location may be from 2 to 6 mm. Incompetent   vein measuring 4 mm in the leg four centimeter from the  medial malleolus, which connects to a competent tributary of the great  saphenous vein. No varicose veins. Incidental right popliteal cyst.     2. Left leg: Incompetence of the common femoral vein, femoral vein in  the distal thigh and popliteal vein. Incompetent saphenofemoral  junction, greater saphenous vein in the proximal calf. The diameter of  the greater saphenous vein about the SFJ varies from 4.9 to 7.2 mm and  3 to 4.4 mm in the region of the knee/proximal calf. 2 incompetent  perforators in the lower leg, measuring up to 2.7 and 2.3 cm. No  varicose veins.     3. Please see detailed assessment of arterial vasculature as per  report 11/13/2017.      Duplex  1. Right leg: No evidence of hemodynamically significant stenosis.  2. Left leg: Mildly elevated velocities in the mid SFA up to 211 cm/s,  indicating near 50% stenosis with biphasic distal waveforms. Abnormal  waveforms in the CFA are likely related to venous contamination given  lack of significant disease on the comparison CT from 9/26/17, and  relatively normal distal waveforms.       ABIs  Findings:  Right:    Arm: 115 mmHg   PT at ankle: 177 mmHg   DP at foot: 170 mmHg   DELFINO: 150   TBI: 1.56  Left:   Arm: 118 mmHg   PT at ankle: >254 mmHg   DP at foot: >254 mmHg   DELFINO: Noncompressible   TBI: 1.50  Impression:   Right leg: Resting DELFINO is 1.50,  abnormal, >1.40 (Inconclusive  severity). This is likely related to calcified vessels as noted on  prior radiographs.  Left leg: Resting DELFINO is abnormal, >1.40 (Inconclusive severity). This  is likely related to calcified vessels as noted on prior radiographs.      Assessment and Plan:     #AFIB: in afib or flutter in clinic with ventricular paced rate of 105, asymptomatic  - continue metoprolol, coumadin  - has follow up with EP next week; sent his device transmission in today  - will message EP to follow up results of the interrogation to see if any settings need to be changed to reduce rapid ventricular pacing    #CAD - no symptoms, s/p CABG and PCI x 2, continue on aspirin.     #DVT   - on longterm warfarin, well controlled INR. No bleeding/bruising.  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 Swelling is now well controlled with compression stockings.     #Device - due for battery change soon, f/u EP as scheduled.    #PAD - mild left SFA disease, no claudication; mild carotid artery disease. Continue medical management; repeat carotid us in 2-3 years    It was a pleasure to see Mr. Florence in clinic today with Dr. Desai.  Please do not hesitate to call with any further questions or concerns.     Wyatt Mejias MD  Cardiovascular Fellow, PGY-7  [phone]    ATTENDING ATTESTATION    Patient was seen and evaluated in clinic today with the cardiology fellow. The note has been edited to reflect the history taking performed by me, my personal review of imaging, EKGs, labs and procedures, and our joint assessment and plan.     Total time spent 60 minutes, of which >50% was spent in face-to-face patient evaluation, reviewing data with patient, prolonged counseling of lifestyle modifications, any  necessary genetic testing and screening of family members, and coordination of care.         Frida Desai MD

## 2018-11-27 NOTE — PATIENT INSTRUCTIONS
You were seen today in the Cardiovascular Clinic at the [facility].      Cardiology Providers you saw during your visit:   Dr. Desai    Diagnosis:   (R00.0) Tachycardia  (primary encounter diagnosis)    (I73.9) PVD (peripheral vascular disease) (H)    (R09.89) Bruit of left carotid artery    Results:  EKG reviewed.    Recommendations:   Continue current medications       Follow-up:  6 months with Dr. Desai      For emergencies call [phone].    For any scheduling needs, please call [phone].     Thank you for your visit today!     Please call if you have any questions or concerns.  Juanito Woodard RN

## 2018-11-28 LAB — INTERPRETATION ECG - MUSE: NORMAL

## 2018-12-03 ENCOUNTER — ALLIED HEALTH/NURSE VISIT (OUTPATIENT)
Dept: CARDIOLOGY | Facility: CLINIC | Age: 83
End: 2018-12-03
Payer: COMMERCIAL

## 2018-12-03 ENCOUNTER — OFFICE VISIT (OUTPATIENT)
Dept: CARDIOLOGY | Facility: CLINIC | Age: 83
End: 2018-12-03
Attending: INTERNAL MEDICINE
Payer: COMMERCIAL

## 2018-12-03 VITALS
HEART RATE: 109 BPM | HEIGHT: 68 IN | BODY MASS INDEX: 22.51 KG/M2 | WEIGHT: 148.5 LBS | DIASTOLIC BLOOD PRESSURE: 74 MMHG | OXYGEN SATURATION: 100 % | SYSTOLIC BLOOD PRESSURE: 122 MMHG

## 2018-12-03 DIAGNOSIS — I48.0 PAROXYSMAL ATRIAL FIBRILLATION (H): ICD-10-CM

## 2018-12-03 DIAGNOSIS — I47.29 NSVT (NONSUSTAINED VENTRICULAR TACHYCARDIA) (H): Primary | ICD-10-CM

## 2018-12-03 DIAGNOSIS — Z45.02 IMPLANTABLE CARDIOVERTER-DEFIBRILLATOR (ICD) AT END OF BATTERY LIFE: ICD-10-CM

## 2018-12-03 PROBLEM — Z95.0 CARDIAC PACEMAKER IN SITU: Status: RESOLVED | Noted: 2018-08-02 | Resolved: 2018-12-03

## 2018-12-03 PROCEDURE — 99215 OFFICE O/P EST HI 40 MIN: CPT | Mod: ZP | Performed by: INTERNAL MEDICINE

## 2018-12-03 PROCEDURE — G0463 HOSPITAL OUTPT CLINIC VISIT: HCPCS | Mod: 25,ZF

## 2018-12-03 PROCEDURE — 93005 ELECTROCARDIOGRAM TRACING: CPT | Mod: ZF

## 2018-12-03 PROCEDURE — 93010 ELECTROCARDIOGRAM REPORT: CPT | Mod: ZP | Performed by: INTERNAL MEDICINE

## 2018-12-03 RX ORDER — LIDOCAINE 40 MG/G
CREAM TOPICAL
Status: CANCELLED | OUTPATIENT
Start: 2018-12-03

## 2018-12-03 RX ORDER — CLINDAMYCIN PHOSPHATE 900 MG/50ML
900 INJECTION, SOLUTION INTRAVENOUS
Status: CANCELLED | OUTPATIENT
Start: 2018-12-03

## 2018-12-03 ASSESSMENT — PAIN SCALES - GENERAL: PAINLEVEL: NO PAIN (0)

## 2018-12-03 NOTE — PROGRESS NOTES
Preliminary Device Interrogation Results.  Final physician signed paceart report to be scanned and attached.    Patient seen in clinic for evaluation and iterative programming of his Medtronic dual lead ICD per MD orders.  Patient has an appointment to see Dr. Baird today.  Normal ICD function.  72 AT/AF episodes recorded - < 1 min - 1 hr 3 min in duration.  AF burden = 0.1%.  1 episode recorded in the VT monitor zone on 9/12/18 that appears to be an Atrial Tachycardia - 1 min 9 sec, 144 bpm.  No arrhythmias recorded.  Patient is taking Coumadin.  Intrinsic rhythm = SB with first degree AVB @ 48 bpm.  AP = 68.9%.   = 25.3%.  OptiVol fluid index is slightly above the baseline.  Battery voltage = 2.61 V.  (RRT battery voltage = 2.63 V).  No short v-v intervals recorded.  RV lead impedance trends appear stable.  Patient reports that he is feeling well and denies complaints.  Plan for patient to send a remote transmission in 3 months and return to clinic in 6 months.  Dr. Baird notified of interrogation results.    Dual lead ICD

## 2018-12-03 NOTE — PROGRESS NOTES
HPI: Patient presents to clinic because his ICD generator has reached RRT.    Pt is an 82 year old male with several medical problems including HTN, HL, CKD3, CAD s/p CABG x2, DESx2, polymorphic VT s/p ICD placement, h/o atrial fibrillation and atrial flutter s/p cavotricuspid isthmus ablation (6/2014) and right atrial appendage atrial tachycardia ablation (9/2014).     Patient presents to clinic today because his ICD battery has reached 2.61 V, RRT is 2.63 V.  Patient denies any symptoms of palpitations, exertional angina, frequent lightheadedness, presyncope or syncope.      PAST MEDICAL HISTORY:  Past Medical History:   Diagnosis Date     Advanced open-angle glaucoma      Antiplatelet or antithrombotic long-term use      Atrial fibrillation (H)      CKD (chronic kidney disease), stage III (H) 2005     Colon cancer (H)     Stage II-B colon cancer     Coronary artery disease     s/p CABG x 2, JEREMY x 2     Diverticulitis      Hyperlipidemia      Hypertension      Ischemic cardiomyopathy      MGUS (monoclonal gammopathy of unknown significance)      Nonsenile cataract     BE     Pacemaker      Polymorphic ventricular tachycardia (H)      Polymyalgia rheumatica (H)      PVD (posterior vitreous detachment), both eyes 2005     S/P ablation of atrial flutter 6/20/14    CTI     Stented coronary artery      SVT (supraventricular tachycardia) (H)     PPM/AICD for NSVT     Upper leg DVT (deep venous thromboembolism), chronic (H)     Left     Weight loss, unintentional 2017    15 lb in 4 months       CURRENT MEDICATIONS:  Current Outpatient Prescriptions   Medication Sig Dispense Refill     aspirin 81 MG tablet Take 1 tablet by mouth daily.       atorvastatin (LIPITOR) 40 MG tablet Take 1 tablet (40 mg) by mouth daily 100 tablet 3     bacitracin ointment Apply topically 2 times daily APPLY TO LEFT LEG TWO TIMES PER DAY 28 g 0     Cholecalciferol (VITAMIN D-3 PO) Take 1 tablet by mouth 2 times daily       ciclopirox (LOPROX)  0.77 % cream Apply topically 2 times daily To feet and toenails. 90 g 6     cyanocobalamin (VITAMIN  B-12) 1000 MCG tablet Take 2 tablets (2,000 mcg) by mouth daily 180 tablet 3     doxazosin (CARDURA) 2 MG tablet Take 1/2 tab in the morning and 1/2 tab in the evening 90 tablet 3     ketoconazole (NIZORAL) 2 % cream Apply topically daily On hold 60 g 3     metoprolol tartrate (LOPRESSOR) 25 MG tablet Take 1 tablet (25 mg) by mouth 2 times daily 180 tablet 3     mirtazapine (REMERON) 15 MG tablet Take 15 mg by mouth At Bedtime.       Multiple Vitamins-Minerals (CENTRUM SILVER) per tablet Take 1 tablet by mouth daily. AM        order for DME 20-30 mm Hg knee length compression stockings.  Measure and fit.  Style and color per patient preference.  Doff n Aracelis per patient need. 1 Units 0     sertraline (ZOLOFT) 100 MG tablet Take 100 mg by mouth every evening.       triamcinolone (KENALOG) 0.1 % cream Apply topically 2 times daily For up to two weeks in a row 80 g 3     warfarin (COUMADIN) 5 MG tablet Take 1 tablet by mouth daily or as directed by the Coumadin clinic. 90 tablet 3     warfarin (COUMADIN) 5 MG tablet 7.5 mg on Wed; 5 mg all other days  or as instructed by coumadin clinic. (Patient taking differently: 5 mg all days  or as instructed by coumadin clinic.) 35 tablet 5     Blood Pressure Monitor KIT 1 Units daily (Patient not taking: Reported on 12/3/2018) 1 kit 0     fluticasone (FLONASE) 50 MCG/ACT nasal spray Spray 2 sprays into both nostrils daily (Patient not taking: Reported on 5/29/2018) 3 Package 0     loratadine (CLARITIN) 10 MG tablet Take 10 mg by mouth as needed          PAST SURGICAL HISTORY:  Past Surgical History:   Procedure Laterality Date     C CABG, ARTERY-VEIN, FOUR  2006    LIMA-LAD, SVG-Rt PDA, SVG-OM2, SVG-Diag 1     C CABG, VEIN, SINGLE  1994    1-vessel CABG, SVG->PDRCA      CATARACT IOL, RT/LT Bilateral      COLONOSCOPY  3/13/2014    Procedure: COMBINED COLONOSCOPY, SINGLE  BIOPSY/POLYPECTOMY BY BIOPSY;;  Surgeon: Mary Gerber MD;  [facility]     COLONOSCOPY N/A 6/22/2015    Procedure: COLONOSCOPY;  Surgeon: Marilin Newman MD;  [facility]     COLONOSCOPY N/A 11/7/2018    Procedure: COMBINED COLONOSCOPY, SINGLE OR MULTIPLE BIOPSY/POLYPECTOMY BY BIOPSY;  Surgeon: Roberto Esteban MD;  [facility]     H ABLATION ATRIAL FLUTTER       HEART CATH DRUG ELUTING STENT PLACEMENT  4/19/2012    JEREMY x 2 to LCx     IMPLANT IMPLANTABLE CARDIOVERTER DEFIBRILLATOR  5-    ppm/aicd     KNEE SURGERY      right and left knee surgeries     LAPAROSCOPIC ASSISTED COLECTOMY LEFT (DESCENDING)  4/8/2014    Procedure: LAPAROSCOPIC ASSISTED COLECTOMY LEFT (DESCENDING);  Hand assisted Laparoscopic Sigmoid Colectomy , Laparoscopic mobilization of spleenic flexure *Latex Allergy*Anesthesia General with Block;  Surgeon: Chanelle Guzmán MD;  [facility]     REPAIR VALVE MITRAL  2006     30-mm Medtronic Julian ring      SELECTIVE LASER TRABECULOPLASTY (SLT) OD (RIGHT EYE)  4/10, 1/12,+1/9/13    RE     SELECTIVE LASER TRABECULOPLASTY (SLT) OS (LEFT EYE)  5/2012     SHOULDER SURGERY      right rotator cuff       ALLERGIES:     Allergies   Allergen Reactions     Latex Rash     Rash       FAMILY HISTORY:  - Premature coronary artery disease  - Atrial fibrillation  - Sudden cardiac death     SOCIAL HISTORY:  Social History   Substance Use Topics     Smoking status: Former Smoker     Types: Cigarettes, Cigars     Quit date: 1/1/1968     Smokeless tobacco: Never Used     Alcohol use 0.0 oz/week     0 Standard drinks or equivalent per week      Comment: 2-3 drinks twice weekly       ROS:   Constitutional: No fever, chills, or sweats. Weight stable.   ENT: No visual disturbance, ear ache, epistaxis, sore throat.   Cardiovascular: As per HPI.   Respiratory: No cough, hemoptysis.    GI: No nausea, vomiting, hematemesis, melena, or hematochezia.   : No hematuria.  "  Integument: Negative.   Psychiatric: Negative.   Hematologic:  Easy bruising, no easy bleeding.  Neuro: Negative.   Endocrinology: No significant heat or cold intolerance   Musculoskeletal: No myalgia.    Exam:  /74 (BP Location: Left arm, Patient Position: Chair, Cuff Size: Adult Regular)  Pulse 109  Ht 1.727 m (5' 8\")  Wt 67.4 kg (148 lb 8 oz)  SpO2 100%  BMI 22.58 kg/m2  GENERAL APPEARANCE: healthy, alert and no distress  HEENT: no icterus, no xanthelasmas, normal pupil size and reaction, normal palate, mucosa moist, no central cyanosis  NECK: no adenopathy, no asymmetry, masses, or scars, thyroid normal to palpation and no bruits, JVP not elevated  RESPIRATORY: lungs clear to auscultation - no rales, rhonchi or wheezes, no use of accessory muscles, no retractions, respirations are unlabored, normal respiratory rate  CARDIOVASCULAR: regular rhythm, normal S1 with physiologic split S2, no S3 or S4 and no murmur, click or rub, precordium quiet with normal PMI.  ABDOMEN: soft, non tender, without hepatosplenomegaly, no masses palpable, bowel sounds normal, aorta not enlarged by palpation, no abdominal bruits  EXTREMITIES: peripheral pulses normal, no edema, no bruits  NEURO: alert and oriented to person/place/time, normal speech, gait and affect  VASC: Radial, femoral, dorsalis pedis and posterior tibialis pulses are normal in volumes and symmetric bilaterally. No bruits are heard.  SKIN: no ecchymoses, no rashes    Labs:  CBC RESULTS:   Lab Results   Component Value Date    WBC 6.7 11/13/2018    RBC 3.18 (L) 11/13/2018    HGB 9.4 (L) 11/13/2018    HCT 30.4 (L) 11/13/2018    MCV 96 11/13/2018    MCH 29.6 11/13/2018    MCHC 30.9 (L) 11/13/2018    RDW 14.7 11/13/2018     11/13/2018       BMP RESULTS:  Lab Results   Component Value Date     11/13/2018    POTASSIUM 4.3 11/13/2018    CHLORIDE 112 (H) 11/13/2018    CO2 27 11/13/2018    ANIONGAP 4 11/13/2018    GLC 96 11/13/2018    BUN 48 (H) " 11/13/2018    CR 1.27 (H) 11/13/2018    GFRESTIMATED 54 (L) 11/13/2018    GFRESTBLACK 65 11/13/2018    KEITH 8.7 11/13/2018        INR RESULTS:  Lab Results   Component Value Date    INR 2.51 (H) 11/23/2018    INR 1.66 (H) 11/13/2018    INR 1.21 (H) 11/07/2018    INR 2.43 (H) 10/25/2018       Procedures:      Assessment and Plan:     ICD generator has reached RRT  Paroxysmal atrial fibrillation    I discussed extensively with patient and family the aims, risks, and alternatives to replacing the ICD generator.  I explained that risks involved but are not limited to excessive bleeding, infection of the ICD system, or inadvertent injury to the ICD leads.  After an extensive discussion patient would like to proceed with replacement of the ICD generator.    We will replace ICD generator before his upcoming colon surgery.    All questions and concerns were addressed and patient is happy with plan.      CC  Patient Care Team:  Roberto Sarmiento MD as PCP - General (Internal Medicine)  Francisco Gilliam MD as MD (Oncology)  Omero Srinivasan MD as MD (Ophthalmology)  Hay Arnett MD as MD (Internal Medicine)  NIKHIL Wheeler MD as MD (Dermatology)  Cynthia Carrion MD as MD (Cardiology)  Lashawn Cool, RN as Nurse Coordinator (Clinical Cardiac Electrophysiology)  Bhavana Mckeon MD as MD (Internal Medicine)  Naveed Ram MD as Resident  Guzman Boudreaux DPM (Podiatry)  Joshua Centeno MD as MD (Family Practice)  Brett Petersen MD as MD (Dermapathology)  Clarice Magallanes APRN CNP as Nurse Practitioner (Nurse Practitioner - Family)  Lashawn Jackson MD as MD (Dermatology)  Frida Desai MD as MD (Cardiology)  Juanito Woodard, RN as Nurse Coordinator (Cardiology)  Marci Palmer, BYRON as Nurse Coordinator (Oncology)  Carlene Dickey, RN as Nurse Coordinator (Neurology)  CYNTHIA CARRION

## 2018-12-03 NOTE — NURSING NOTE
Vitals completed successfully and medication reconciled. EKG done. Renay Blackwell MA  Chief Complaint   Patient presents with     Follow Up For     1 yr follow-up AT.

## 2018-12-03 NOTE — PATIENT INSTRUCTIONS
It was a pleasure to see you in clinic today.  Please do not hesitate to call with any questions or concerns.  You are scheduled for a remote transmission on 3/5/19.  We look forward to seeing you in clinic at your next device check in 6 months.    Kathy Bright, RN, MS, CCRN  Electrophysiology Nurse Clinician  [facility]    During Business Hours Please Call:  [phone]  After Hours Please Call:  [phone] - select option #4 and ask for job code [phone]

## 2018-12-03 NOTE — MR AVS SNAPSHOT
MRN:[id number]                      After Visit Summary   12/3/2018    Noe Florence    MRN: [id number]           Visit Information        Provider Department      12/3/2018 10:30 AM UC CV DEVICE 1 [facility]        Your next 10 appointments already scheduled     Dec 03, 2018  1:30 PM CST   (Arrive by 1:15 PM)   RETURN ARRHYTHMIA with Deedee Baird MD   [facility] ([facility])    [address]  [address]  [place] [address]   [phone]            Jan 07, 2019 10:00 AM CST   (Arrive by 9:45 AM)   Return Visit with Roberto Sarmiento MD   [facility] ([facility])    [address]  4th [address]  [place] [address]   [phone]            Jan 08, 2019 11:00 AM CST   [facility] Lab Draw with  [facility] LAB DRAW   [facility] Draw ([facility])    [address]  [address]  [place] [address]   [phone]            Jan 08, 2019 12:30 PM CST   Return Neuropathy Visit with Jase Duarte MD   [facility] ([facility])    [address]  [address]  [place] [address]   [phone]            Mar 05, 2019 12:00 AM CST   CARDIAC DEVICE CHECK - REMOTE with  ICD REMOTE   [facility] ([facility])    [address]  [address]  [place] [address]   [phone]            Apr 02, 2019 10:15 AM CDT   LAB with  LAB   [facility] ([facility])    [address]  1st [address]  [place] [address]   [phone]           Please do not eat 10-12 hours before your appointment if you are coming in fasting for labs on lipids, cholesterol, or glucose (sugar). This does not apply to pregnant women. Water, hot tea and black coffee (with nothing added) are okay. Do not drink other fluids, diet soda or chew gum.            Apr 02, 2019 11:00  AM CDT   CT CHEST/ABDOMEN/PELVIS W CONTRAST with UCCT2   [facility] CT ([facility])    [address]  1st [address]  [place] [address] [phone]           How do I prepare for my exam? (Food and drink instructions) To prepare: Do not eat or drink for 2 hours before your exam. If you need to take medicine, you may take it with small sips of water. (We may ask you to take liquid medicine as well.)  How do I prepare for my exam? (Other instructions) Please arrive 30 minutes early for your CT.  Once in the department you might be asked to drink water 15-20 minutes prior to your exam.  If indicated you may be asked to drink an oral contrast in advance of your CT.  If this is the case, the imaging team will let you know or be in contact with you prior to your appointment  Patients over 70 or patients with diabetes or kidney problems: If you haven t had a blood test (creatinine test) within the last 30 days, the Cardiologist/Radiologist may require you to get this test prior to your exam.  If you have diabetes:  Continue to take your metformin medication on the day of your exam  What should I wear: Please wear loose clothing, such as a sweat suit or jogging clothes. Avoid snaps, zippers and other metal. We may ask you to undress and put on a hospital gown.  How long does the exam take: Most scans take less than 20 minutes.  What should I bring: Please bring any scans or X-rays taken at other hospitals, if similar tests were done. Also bring a list of your medicines, including vitamins, minerals and over-the-counter drugs. It is safest to leave personal items at home.  Do I need a : No  is needed.  What do I need to tell my doctor? Be sure to tell your doctor: * If you have any allergies. * If there s any chance you are pregnant. * If you are breastfeeding.  What should I do after the exam: No restrictions, You may resume normal activities.  What is this  test: A CT (computed tomography) scan is a series of pictures that allows us to look inside your body. The scanner creates images of the body in cross sections, much like slices of bread. This helps us see any problems more clearly. You may receive contrast (X-ray dye) before or during your scan. You will be asked to drink the contrast.  Who should I call with questions: If you have any questions, please call the Imaging Department where you will have your exam. Directions, parking instructions, and other information is available on our website, [email or web address].            Apr 08, 2019 10:45 AM CDT   (Arrive by 10:30 AM)   Return Visit with Francisco Gilliam MD   [facility] ([facility])    [address] [place] [address]  [address]  [facility] [address] [phone]            May 28, 2019  3:00 PM CDT   (Arrive by 2:45 PM)   Return Vascular Visit with Frida Desai MD   [facility] ([facility])    [address] [place] [address]  [address]  [facility] [address] [phone]              Care Instructions    It was a pleasure to see you in clinic today.  Please do not hesitate to call with any questions or concerns.  You are scheduled for a remote transmission on 3/5/19.  We look forward to seeing you in clinic at your next device check in 6 months.    Kathy Bright, RN, MS, CCRN  Electrophysiology Nurse Clinician  [facility]    During Business Hours Please Call:  [phone]  After Hours Please Call:  [phone] - select option #4 and ask for job code [phone]                       [employer] Information     [employer] gives you secure access to your electronic health record. If you see a primary care provider, you can also send messages to your care team and make appointments. If you have questions, please call your primary care clinic.  If you do not have a primary care provider, please call [phone] and they  will assist you.      [email or web address] is an electronic gateway that provides easy, online access to your medical records. With [email or web address], you can request a clinic appointment, read your test results, renew a prescription or communicate with your care team.     To access your existing account, please contact your [facility] or call [phone] for assistance.        Care EveryWhere ID     This is your Care EveryWhere ID. This could be used by other organizations to access your [facility] medical records  [id number]        Equal Access to Services     LEE CASTILLO : Delilah salinaso Solali, waaxda luarcenioadaha, qageorgia kaalmavalerie holcomb, sil minor. So [facility] [phone].    ATENCIÓN: Si habla español, tiene a aguirre disposición servicios gratuitos de asistencia lingüística. Llame al [phone].    We comply with applicable federal civil rights laws and [place] laws. We do not discriminate on the basis of race, color, national origin, age, disability, sex, sexual orientation, or gender identity.

## 2018-12-03 NOTE — MR AVS SNAPSHOT
After Visit Summary   12/3/2018    Noe Florence    MRN: [id number]           Patient Information     Date Of Birth          1935        Visit Information        Provider Department      12/3/2018 1:30 PM Deedee Baird MD [facility] [place]        Today's Diagnoses     Paroxysmal atrial fibrillation (H)          Care Instructions      You are scheduled for an ICD generator change at the [facility].    You should arrive and check in at the [facility] waiting room, located in the [facility], at _______________.  The address is: [address] [place] [address].  If you need to cancel this procedure, please call [phone].    In preparation for your surgery please do the followin. Do not eat or drink anything after midnight.    2. Cleanse with Waylon wipes the night before and the morning of the procedure according to the attached written instructions.    3. Medication to hold: NA    4. You will discharge the same day. You will need a  and someone to stay with you for 24 hours.        Post-Procedure Instructions  Wound Care  1. Keep your incision (surgery wound) dry for 3 days.  After 3 days, you may remove the outer bandage.  Keep the strips of tape on.  They will be removed at your clinic visit.  2. Check for signs of infection each day.  These include increased redness, swelling, drainage or a fever over 101 F (38.3 C).  Call us immediately if you see any of these signs.  3. If there are no signs of infection, you may shower in 3 days.  Do not submerge the incision (in a bath tub, hot tub, or swimming pool) until fully healed.  4. If Dermabond has been applied to your incision,  do not apply powder or lotion to the incision for 14 days. You may shower in the morning.  Pain  1. You may have mild to moderate pain for 3 to 5 days.  Take acetaminophen (Tylenol) or ibuprofen (Advil) for the pain.  Call us if the pain is severe or lasts more than 5  "days.  Activity  1. You should slowly go back to your normal activities after 24 hours.  Healing will take 4 to 6 weeks.  2. No driving for 24 hours  3. For 3 days, do not raise your affected arm above your shoulder  4. For 10 days, do not use your affected arm to push, pull, or lift anything over 10 pounds  5. Avoid climbing a ladder alone.  It is best to stay within 4 feet of the ground.  6. Avoid anything that may cause rough contact or a hard hit to your chest.  This includes football, hockey, and other contact sports.  7. Do not go swimming or boating alone.      If you have further questions, please utilize [employer] to contact us.   If your question concerns the above instructions, contact:  Lashawn Cool RN   Electrophysiology Nurse Coordinator.  [phone]    If your question concerns the schedule/appointment times, contact:  [facility]   [phone]          Preparing the Skin Before Surgery  Preparing or \"prepping\" skin before surgery can reduce the risk of infection at the surgical site. To make the process easier, this facility has chosen disposable cloths moistened with rinse-free 2% Chlorhexidine Gluconate antiseptic solution designed to reduce the bacteria on the skin. The steps below outline the prepping process and should be carefully followed.    Prep the skin at the following time(s):    - If you wish to shower, bathe or shampoo your hair, do so the night before and prep your skin afterwards for the first time using one package of cloths.    - Skin must be prepped the morning of the procedure using the second package of cloths.        Prep The Night Before Procedure    Do not allow this product to come in contact with your eyes, ears, mouth or mucous membranes.     Reach into one of the packages, remove the two cloths with the foam vega and place onto a clean table.    Use one clean cloth to prep each are of the body. One cloth for #1 - neck & chest. One cloth for #2 " & #3 - both arms, shoulder to fingertips including armpits. Wipe each area in a back and forth motion for 3 minutes. Be sure to wipe each area thoroughly.    Do not rinse or apply any lotions, moisturizer or make up after prepping.    Discard cloths in trash can.     Allow your skin to air dry. Dress in clean clothes/sleepwear      Prepping your skin on the morning of surgery:    Do not shower, bathe or shampoo hair.    Open a new package and follow the instructions listed above.                                 Follow-ups after your visit        Your next 10 appointments already scheduled     Dec 03, 2018  1:30 PM CST   (Arrive by 1:15 PM)   RETURN ARRHYTHMIA with Deedee Baird MD   [facility] ([facility])    [address] [place] [address]  [address]  [facility] [address] [phone]            Jan 07, 2019 10:00 AM CST   (Arrive by 9:45 AM)   Return Visit with Roberto Sarmiento MD   [facility] ([facility])    [address] [place] [address]  4th Floor  [facility] [address] [phone]            Jan 08, 2019 11:00 AM CST   [facility] Lab Draw with  [facility] DRAW   [facility] Draw ([facility])    [address] [place] [address]  [address]  [facility] [address] [phone]            Jan 08, 2019 12:30 PM CST   Return Neuropathy Visit with Jase Duarte MD   [facility] ([facility])    [address]  [address]  [facility] [address]   [phone]            Mar 05, 2019 12:00 AM CST   CARDIAC DEVICE CHECK - REMOTE with  ICD REMOTE   [facility] ([facility])    [address] [place] [address]  [address]  [facility] [address] [phone]            Apr 02, 2019 10:15 AM CDT   LAB with  LAB   [facility] Lab [facility])    [address] [place] [address]  1st Floor  [facility] [address] [phone]           Please do  not eat 10-12 hours before your appointment if you are coming in fasting for labs on lipids, cholesterol, or glucose (sugar). This does not apply to pregnant women. Water, hot tea and black coffee (with nothing added) are okay. Do not drink other fluids, diet soda or chew gum.            Apr 02, 2019 11:00 AM CDT   CT CHEST/ABDOMEN/PELVIS W CONTRAST with UCCT2   [facility] CT ([facility])    [address]  1st Floor  [facility] [address] [phone]           How do I prepare for my exam? (Food and drink instructions) To prepare: Do not eat or drink for 2 hours before your exam. If you need to take medicine, you may take it with small sips of water. (We may ask you to take liquid medicine as well.)  How do I prepare for my exam? (Other instructions) Please arrive 30 minutes early for your CT.  Once in the department you might be asked to drink water 15-20 minutes prior to your exam.  If indicated you may be asked to drink an oral contrast in advance of your CT.  If this is the case, the imaging team will let you know or be in contact with you prior to your appointment  Patients over 70 or patients with diabetes or kidney problems: If you haven t had a blood test (creatinine test) within the last 30 days, the Cardiologist/Radiologist may require you to get this test prior to your exam.  If you have diabetes:  Continue to take your metformin medication on the day of your exam  What should I wear: Please wear loose clothing, such as a sweat suit or jogging clothes. Avoid snaps, zippers and other metal. We may ask you to undress and put on a hospital gown.  How long does the exam take: Most scans take less than 20 minutes.  What should I bring: Please bring any scans or X-rays taken at other hospitals, if similar tests were done. Also bring a list of your medicines, including vitamins, minerals and over-the-counter drugs. It is safest to leave personal items at home.   Do I need a : No  is needed.  What do I need to tell my doctor? Be sure to tell your doctor: * If you have any allergies. * If there s any chance you are pregnant. * If you are breastfeeding.  What should I do after the exam: No restrictions, You may resume normal activities.  What is this test: A CT (computed tomography) scan is a series of pictures that allows us to look inside your body. The scanner creates images of the body in cross sections, much like slices of bread. This helps us see any problems more clearly. You may receive contrast (X-ray dye) before or during your scan. You will be asked to drink the contrast.  Who should I call with questions: If you have any questions, please call the Imaging Department where you will have your exam. Directions, parking instructions, and other information is available on our website, [employer].            Apr 08, 2019 10:45 AM CDT   (Arrive by 10:30 AM)   Return Visit with Francisco Gilliam MD   [facility] ([facility])    [address] [place] [address]  [address]  [facility] [id number] [phone]            May 28, 2019  3:00 PM CDT   (Arrive by 2:45 PM)   Return Vascular Visit with Frida Desai MD   [facility] ([facility])    [address] [place] [address]  [address]  [facility] [id number] [phone]              Future tests that were ordered for you today     Open Future Orders        Priority Expected Expires Ordered    Cardiac Device Check - Remote Routine  12/2/2020 12/2/2018            Who to contact     If you have questions or need follow up information about today's clinic visit or your schedule please contact [facility] directly at [phone].  Normal or non-critical lab and imaging results will be communicated to you by MyChart, letter or phone within 4 business days after the clinic has received the results. If you do not hear from us  "within 7 days, please contact the clinic through [employer] or phone. If you have a critical or abnormal lab result, we will notify you by phone as soon as possible.  Submit refill requests through [employer] or call your pharmacy and they will forward the refill request to us. Please allow 3 business days for your refill to be completed.          Additional Information About Your Visit        [facility] [employer] Information     [employer] gives you secure access to your electronic health record. If you see a primary care provider, you can also send messages to your care team and make appointments. If you have questions, please call your primary care clinic.  If you do not have a primary care provider, please call [phone] and they will assist you.        Care EveryWhere ID     This is your Care EveryWhere ID. This could be used by other organizations to access your [facility] medical records  [id number]        Your Vitals Were     Pulse Height Pulse Oximetry BMI (Body Mass Index)          109 1.727 m (5' 8\") 100% 22.58 kg/m2         Blood Pressure from Last 3 Encounters:   12/03/18 122/74   11/27/18 127/80   11/14/18 132/80    Weight from Last 3 Encounters:   12/03/18 67.4 kg (148 lb 8 oz)   11/27/18 68.7 kg (151 lb 8 oz)   11/14/18 66.4 kg (146 lb 4.8 oz)              We Performed the Following     EKG 12-lead, tracing only (Same Day)     Follow-Up with  Cardiac  Advanced Practice Provider- 3 Months          Today's Medication Changes          These changes are accurate as of 12/3/18 12:29 PM.  If you have any questions, ask your nurse or doctor.               These medicines have changed or have updated prescriptions.        Dose/Directions    * warfarin 5 MG tablet   Commonly known as:  COUMADIN   This may have changed:  additional instructions   Used for:  Atrial flutter (H)        7.5 mg on Wed; 5 mg all other days  or as instructed by coumadin clinic.   Quantity:  35 tablet   Refills:  5       * warfarin 5 MG tablet "   Commonly known as:  COUMADIN   This may have changed:  Another medication with the same name was changed. Make sure you understand how and when to take each.   Used for:  Paroxysmal atrial fibrillation (H), Long-term (current) use of anticoagulants        Take 1 tablet by mouth daily or as directed by the Coumadin clinic.   Quantity:  90 tablet   Refills:  3       * Notice:  This list has 2 medication(s) that are the same as other medications prescribed for you. Read the directions carefully, and ask your doctor or other care provider to review them with you.             Primary Care Provider Office Phone # Fax #    Roberto Sarmiento [phone] [address] [facility] [address]        Equal Access to Services     LEE CASTILLO : Delilah Bains, jayashree harmon, gino holcomb, sil minor. So [facility] [phone].    ATENCIÓN: Si habla español, tiene a aguirre disposición servicios gratuitos de asistencia lingüística. Llame al [phone].    We comply with applicable federal civil rights laws and [place] laws. We do not discriminate on the basis of race, color, national origin, age, disability, sex, sexual orientation, or gender identity.            Thank you!     Thank you for choosing [facility]  for your care. Our goal is always to provide you with excellent care. Hearing back from our patients is one way we can continue to improve our services. Please take a few minutes to complete the written survey that you may receive in the mail after your visit with us. Thank you!             Your Updated Medication List - Protect others around you: Learn how to safely use, store and throw away your medicines at www.disposemymeds.org.          This list is accurate as of 12/3/18 12:29 PM.  Always use your most recent med list.                   Brand Name Dispense Instructions for use Diagnosis    aspirin 81 MG tablet    ASA      Take 1 tablet by mouth daily.        atorvastatin 40 MG tablet    LIPITOR    100 tablet    Take 1 tablet (40 mg) by mouth daily    Hyperlipidemia, unspecified hyperlipidemia type       bacitracin 500 UNIT/GM external ointment     28 g    Apply topically 2 times daily APPLY TO LEFT LEG TWO TIMES PER DAY    Left leg cellulitis       Blood Pressure Monitor Kit     1 kit    1 Units daily    Hypertension       ciclopirox 0.77 % cream    LOPROX    90 g    Apply topically 2 times daily To feet and toenails.    Dermatophytosis of nail, Tinea pedis of both feet       doxazosin 2 MG tablet    CARDURA    90 tablet    Take 1/2 tab in the morning and 1/2 tab in the evening    Essential hypertension       fluticasone 50 MCG/ACT nasal spray    FLONASE    3 Package    Spray 2 sprays into both nostrils daily    Unspecified sinusitis (chronic)       ketoconazole 2 % external cream    NIZORAL    60 g    Apply topically daily On hold    Tinea corporis       loratadine 10 MG tablet    CLARITIN     Take 10 mg by mouth as needed        metoprolol tartrate 25 MG tablet    LOPRESSOR    180 tablet    Take 1 tablet (25 mg) by mouth 2 times daily    Coronary artery disease involving native heart without angina pectoris, unspecified vessel or lesion type       mirtazapine 15 MG tablet    REMERON     Take 15 mg by mouth At Bedtime.        multivitamin tablet      Take 1 tablet by mouth daily. AM        order for DME     1 Units    20-30 mm Hg knee length compression stockings.  Measure and fit.  Style and color per patient preference.  Doff n Aracelis per patient need.    PVD (peripheral vascular disease) (H), Ulcer of left lower extremity, limited to breakdown of skin (H)       triamcinolone 0.1 % external cream    KENALOG    80 g    Apply topically 2 times daily For up to two weeks in a row    Atopic eczema       vitamin B-12 1000 MCG tablet    CYANOCOBALAMIN    180 tablet    Take 2 tablets (2,000 mcg) by mouth daily    Anemia       VITAMIN D-3  PO      Take 1 tablet by mouth 2 times daily        * warfarin 5 MG tablet    COUMADIN    35 tablet    7.5 mg on Wed; 5 mg all other days  or as instructed by coumadin clinic.    Atrial flutter (H)       * warfarin 5 MG tablet    COUMADIN    90 tablet    Take 1 tablet by mouth daily or as directed by the Coumadin clinic.    Paroxysmal atrial fibrillation (H), Long-term (current) use of anticoagulants       ZOLOFT 100 MG tablet   Generic drug:  sertraline      Take 100 mg by mouth every evening.        * Notice:  This list has 2 medication(s) that are the same as other medications prescribed for you. Read the directions carefully, and ask your doctor or other care provider to review them with you.

## 2018-12-03 NOTE — LETTER
12/3/2018      RE: Noe Florence  [address] [facility] [address]       Dear Colleague,    Thank you for the opportunity to participate in the care of your patient, Noe Florence, at the [facility] at [facility]. Please see a copy of my visit note below.    HPI: Patient presents to clinic because his ICD generator has reached RRT.    Pt is an 82 year old male with several medical problems including HTN, HL, CKD3, CAD s/p CABG x2, DESx2, polymorphic VT s/p ICD placement, h/o atrial fibrillation and atrial flutter s/p cavotricuspid isthmus ablation (6/2014) and right atrial appendage atrial tachycardia ablation (9/2014).     Patient presents to clinic today because his ICD battery has reached 2.61 V, RRT is 2.63 V.  Patient denies any symptoms of palpitations, exertional angina, frequent lightheadedness, presyncope or syncope.      PAST MEDICAL HISTORY:  Past Medical History:   Diagnosis Date     Advanced open-angle glaucoma      Antiplatelet or antithrombotic long-term use      Atrial fibrillation (H)      CKD (chronic kidney disease), stage III (H) 2005     Colon cancer (H)     Stage II-B colon cancer     Coronary artery disease     s/p CABG x 2, JEREMY x 2     Diverticulitis      Hyperlipidemia      Hypertension      Ischemic cardiomyopathy      MGUS (monoclonal gammopathy of unknown significance)      Nonsenile cataract     BE     Pacemaker      Polymorphic ventricular tachycardia (H)      Polymyalgia rheumatica (H)      PVD (posterior vitreous detachment), both eyes 2005     S/P ablation of atrial flutter 6/20/14    CTI     Stented coronary artery      SVT (supraventricular tachycardia) (H)     PPM/AICD for NSVT     Upper leg DVT (deep venous thromboembolism), chronic (H)     Left     Weight loss, unintentional 2017    15 lb in 4 months       CURRENT MEDICATIONS:  Current Outpatient Prescriptions   Medication Sig Dispense Refill     aspirin 81 MG tablet Take  1 tablet by mouth daily.       atorvastatin (LIPITOR) 40 MG tablet Take 1 tablet (40 mg) by mouth daily 100 tablet 3     bacitracin ointment Apply topically 2 times daily APPLY TO LEFT LEG TWO TIMES PER DAY 28 g 0     Cholecalciferol (VITAMIN D-3 PO) Take 1 tablet by mouth 2 times daily       ciclopirox (LOPROX) 0.77 % cream Apply topically 2 times daily To feet and toenails. 90 g 6     cyanocobalamin (VITAMIN  B-12) 1000 MCG tablet Take 2 tablets (2,000 mcg) by mouth daily 180 tablet 3     doxazosin (CARDURA) 2 MG tablet Take 1/2 tab in the morning and 1/2 tab in the evening 90 tablet 3     ketoconazole (NIZORAL) 2 % cream Apply topically daily On hold 60 g 3     metoprolol tartrate (LOPRESSOR) 25 MG tablet Take 1 tablet (25 mg) by mouth 2 times daily 180 tablet 3     mirtazapine (REMERON) 15 MG tablet Take 15 mg by mouth At Bedtime.       Multiple Vitamins-Minerals (CENTRUM SILVER) per tablet Take 1 tablet by mouth daily. AM        order for DME 20-30 mm Hg knee length compression stockings.  Measure and fit.  Style and color per patient preference.  Doff n Aracelis per patient need. 1 Units 0     sertraline (ZOLOFT) 100 MG tablet Take 100 mg by mouth every evening.       triamcinolone (KENALOG) 0.1 % cream Apply topically 2 times daily For up to two weeks in a row 80 g 3     warfarin (COUMADIN) 5 MG tablet Take 1 tablet by mouth daily or as directed by the Coumadin clinic. 90 tablet 3     warfarin (COUMADIN) 5 MG tablet 7.5 mg on Wed; 5 mg all other days  or as instructed by coumadin clinic. (Patient taking differently: 5 mg all days  or as instructed by coumadin clinic.) 35 tablet 5     Blood Pressure Monitor KIT 1 Units daily (Patient not taking: Reported on 12/3/2018) 1 kit 0     fluticasone (FLONASE) 50 MCG/ACT nasal spray Spray 2 sprays into both nostrils daily (Patient not taking: Reported on 5/29/2018) 3 Package 0     loratadine (CLARITIN) 10 MG tablet Take 10 mg by mouth as needed          PAST SURGICAL  HISTORY:  Past Surgical History:   Procedure Laterality Date     C CABG, ARTERY-VEIN, FOUR  2006    LIMA-LAD, SVG-Rt PDA, SVG-OM2, SVG-Diag 1     C CABG, VEIN, SINGLE  1994    1-vessel CABG, SVG->PDRCA      CATARACT IOL, RT/LT Bilateral      COLONOSCOPY  3/13/2014    Procedure: COMBINED COLONOSCOPY, SINGLE BIOPSY/POLYPECTOMY BY BIOPSY;;  Surgeon: Mary Gerber MD;  Location: [facility]     COLONOSCOPY N/A 6/22/2015    Procedure: COLONOSCOPY;  Surgeon: Marilin Newman MD;  Location: [facility]     COLONOSCOPY N/A 11/7/2018    Procedure: COMBINED COLONOSCOPY, SINGLE OR MULTIPLE BIOPSY/POLYPECTOMY BY BIOPSY;  Surgeon: Roberto Esteban MD;  Location: [facility]     H ABLATION ATRIAL FLUTTER       HEART CATH DRUG ELUTING STENT PLACEMENT  4/19/2012    JEREMY x 2 to LCx     IMPLANT IMPLANTABLE CARDIOVERTER DEFIBRILLATOR  5-    ppm/aicd     KNEE SURGERY      right and left knee surgeries     LAPAROSCOPIC ASSISTED COLECTOMY LEFT (DESCENDING)  4/8/2014    Procedure: LAPAROSCOPIC ASSISTED COLECTOMY LEFT (DESCENDING);  Hand assisted Laparoscopic Sigmoid Colectomy , Laparoscopic mobilization of spleenic flexure *Latex Allergy*Anesthesia General with Block;  Surgeon: Chanelle Guzmán MD;  Location: [facility]     REPAIR VALVE MITRAL  2006     30-mm Medtronic Julian ring      SELECTIVE LASER TRABECULOPLASTY (SLT) OD (RIGHT EYE)  4/10, 1/12,+1/9/13    RE     SELECTIVE LASER TRABECULOPLASTY (SLT) OS (LEFT EYE)  5/2012     SHOULDER SURGERY      right rotator cuff       ALLERGIES:     Allergies   Allergen Reactions     Latex Rash     Rash       FAMILY HISTORY:  - Premature coronary artery disease  - Atrial fibrillation  - Sudden cardiac death     SOCIAL HISTORY:  Social History   Substance Use Topics     Smoking status: Former Smoker     Types: Cigarettes, Cigars     Quit date: 1/1/1968     Smokeless tobacco: Never Used     Alcohol use 0.0 oz/week     0 Standard drinks or equivalent per week      Comment: 2-3 drinks  "twice weekly       ROS:   Constitutional: No fever, chills, or sweats. Weight stable.   ENT: No visual disturbance, ear ache, epistaxis, sore throat.   Cardiovascular: As per HPI.   Respiratory: No cough, hemoptysis.    GI: No nausea, vomiting, hematemesis, melena, or hematochezia.   : No hematuria.   Integument: Negative.   Psychiatric: Negative.   Hematologic:  Easy bruising, no easy bleeding.  Neuro: Negative.   Endocrinology: No significant heat or cold intolerance   Musculoskeletal: No myalgia.    Exam:  /74 (BP Location: Left arm, Patient Position: Chair, Cuff Size: Adult Regular)  Pulse 109  Ht 1.727 m (5' 8\")  Wt 67.4 kg (148 lb 8 oz)  SpO2 100%  BMI 22.58 kg/m2  GENERAL APPEARANCE: healthy, alert and no distress  HEENT: no icterus, no xanthelasmas, normal pupil size and reaction, normal palate, mucosa moist, no central cyanosis  NECK: no adenopathy, no asymmetry, masses, or scars, thyroid normal to palpation and no bruits, JVP not elevated  RESPIRATORY: lungs clear to auscultation - no rales, rhonchi or wheezes, no use of accessory muscles, no retractions, respirations are unlabored, normal respiratory rate  CARDIOVASCULAR: regular rhythm, normal S1 with physiologic split S2, no S3 or S4 and no murmur, click or rub, precordium quiet with normal PMI.  ABDOMEN: soft, non tender, without hepatosplenomegaly, no masses palpable, bowel sounds normal, aorta not enlarged by palpation, no abdominal bruits  EXTREMITIES: peripheral pulses normal, no edema, no bruits  NEURO: alert and oriented to person/place/time, normal speech, gait and affect  VASC: Radial, femoral, dorsalis pedis and posterior tibialis pulses are normal in volumes and symmetric bilaterally. No bruits are heard.  SKIN: no ecchymoses, no rashes    Labs:  CBC RESULTS:   Lab Results   Component Value Date    WBC 6.7 11/13/2018    RBC 3.18 (L) 11/13/2018    HGB 9.4 (L) 11/13/2018    HCT 30.4 (L) 11/13/2018    MCV 96 11/13/2018    MCH 29.6 " 11/13/2018    MCHC 30.9 (L) 11/13/2018    RDW 14.7 11/13/2018     11/13/2018       BMP RESULTS:  Lab Results   Component Value Date     11/13/2018    POTASSIUM 4.3 11/13/2018    CHLORIDE 112 (H) 11/13/2018    CO2 27 11/13/2018    ANIONGAP 4 11/13/2018    GLC 96 11/13/2018    BUN 48 (H) 11/13/2018    CR 1.27 (H) 11/13/2018    GFRESTIMATED 54 (L) 11/13/2018    GFRESTBLACK 65 11/13/2018    KEITH 8.7 11/13/2018        INR RESULTS:  Lab Results   Component Value Date    INR 2.51 (H) 11/23/2018    INR 1.66 (H) 11/13/2018    INR 1.21 (H) 11/07/2018    INR 2.43 (H) 10/25/2018       Procedures:      Assessment and Plan:     ICD generator has reached RRT  Paroxysmal atrial fibrillation    I discussed extensively with patient and family the aims, risks, and alternatives to replacing the ICD generator.  I explained that risks involved but are not limited to excessive bleeding, infection of the ICD system, or inadvertent injury to the ICD leads.  After an extensive discussion patient would like to proceed with replacement of the ICD generator.    We will replace ICD generator before his upcoming colon surgery.    All questions and concerns were addressed and patient is happy with plan.    Deedee Baird MD      Patient Care Team:  Roberto Sarmiento MD as PCP - General (Internal Medicine)  Francisco Gilliam MD as MD (Oncology)  Omero Srinivasan MD as MD (Ophthalmology)  Hay Arnett MD as MD (Internal Medicine)  NIKHIL Wheeler MD as MD (Dermatology)  Deedee Baird MD as MD (Cardiology)  Lashawn Cool, RN as Nurse Coordinator (Clinical Cardiac Electrophysiology)  Bhavana Mckeon MD as MD (Internal Medicine)  Naveed Ram MD as Resident  Guzman Boudreaux DPM (Podiatry)  Joshua Centeno MD as MD (Family Practice)  Brett Petersen MD as MD (Dermapathology)  Clarice Magallanes APRN CNP as Nurse Practitioner (Nurse Practitioner - Family)  Lashawn Jackson,  MD as MD (Dermatology)  Frida Desai MD as MD (Cardiology)  Juanito Woodard, RN as Nurse Coordinator (Cardiology)  Marci Palmer, RN as Nurse Coordinator (Oncology)  Carlene Dickey, RN as Nurse Coordinator (Neurology)  CYNTHIA CARRION

## 2018-12-03 NOTE — PATIENT INSTRUCTIONS
You are scheduled for an ICD generator change at the [facility].    You should arrive and check in at the [facility] waiting room, located in the [facility], at _______________.  The address is: [address] [place] [id number].  If you need to cancel this procedure, please call [phone].    In preparation for your surgery please do the followin. Do not eat or drink anything after midnight.    2. Cleanse with Waylon wipes the night before and the morning of the procedure according to the attached written instructions.    3. Medication to hold: NA    4. You will discharge the same day. You will need a  and someone to stay with you for 24 hours.        Post-Procedure Instructions  Wound Care  1. Keep your incision (surgery wound) dry for 3 days.  After 3 days, you may remove the outer bandage.  Keep the strips of tape on.  They will be removed at your clinic visit.  2. Check for signs of infection each day.  These include increased redness, swelling, drainage or a fever over 101 F (38.3 C).  Call us immediately if you see any of these signs.  3. If there are no signs of infection, you may shower in 3 days.  Do not submerge the incision (in a bath tub, hot tub, or swimming pool) until fully healed.  4. If Dermabond has been applied to your incision,  do not apply powder or lotion to the incision for 14 days. You may shower in the morning.  Pain  1. You may have mild to moderate pain for 3 to 5 days.  Take acetaminophen (Tylenol) or ibuprofen (Advil) for the pain.  Call us if the pain is severe or lasts more than 5 days.  Activity  1. You should slowly go back to your normal activities after 24 hours.  Healing will take 4 to 6 weeks.  2. No driving for 24 hours  3. For 3 days, do not raise your affected arm above your shoulder  4. For 10 days, do not use your affected arm to push, pull, or lift anything over 10 pounds  5. Avoid climbing a ladder alone.  It is best to stay  "within 4 feet of the ground.  6. Avoid anything that may cause rough contact or a hard hit to your chest.  This includes football, hockey, and other contact sports.  7. Do not go swimming or boating alone.      If you have further questions, please utilize [employer] to contact us.   If your question concerns the above instructions, contact:  Lashawn Cool RN   Electrophysiology Nurse Coordinator.  [phone]    If your question concerns the schedule/appointment times, contact:  PAYTON Sanchez Procedure   [phone]          Preparing the Skin Before Surgery  Preparing or \"prepping\" skin before surgery can reduce the risk of infection at the surgical site. To make the process easier, this facility has chosen disposable cloths moistened with rinse-free 2% Chlorhexidine Gluconate antiseptic solution designed to reduce the bacteria on the skin. The steps below outline the prepping process and should be carefully followed.    Prep the skin at the following time(s):    - If you wish to shower, bathe or shampoo your hair, do so the night before and prep your skin afterwards for the first time using one package of cloths.    - Skin must be prepped the morning of the procedure using the second package of cloths.        Prep The Night Before Procedure    Do not allow this product to come in contact with your eyes, ears, mouth or mucous membranes.     Reach into one of the packages, remove the two cloths with the foam vega and place onto a clean table.    Use one clean cloth to prep each are of the body. One cloth for #1 - neck & chest. One cloth for #2 & #3 - both arms, shoulder to fingertips including armpits. Wipe each area in a back and forth motion for 3 minutes. Be sure to wipe each area thoroughly.    Do not rinse or apply any lotions, moisturizer or make up after prepping.    Discard cloths in trash can.     Allow your skin to air dry. Dress in clean clothes/sleepwear      Prepping your skin on the " morning of surgery:    Do not shower, bathe or shampoo hair.    Open a new package and follow the instructions listed above.

## 2018-12-04 ENCOUNTER — TELEPHONE (OUTPATIENT)
Dept: CARDIOLOGY | Facility: CLINIC | Age: 83
End: 2018-12-04

## 2018-12-04 NOTE — TELEPHONE ENCOUNTER
Scheduling Confirmation    Surgery Procedure Scheduled:   12/4    Surgeon:   FELIPE CARRION    Date of Surgery:   12/11    Time of Surgery:    8:30    Location: [facility] with:   FELIPE Osuna  Periop Electrophysiology   [phone]

## 2018-12-05 LAB — INTERPRETATION ECG - MUSE: NORMAL

## 2018-12-10 ENCOUNTER — CARE COORDINATION (OUTPATIENT)
Dept: CARDIOLOGY | Facility: CLINIC | Age: 83
End: 2018-12-10

## 2018-12-10 DIAGNOSIS — Z95.810 AUTOMATIC IMPLANTABLE CARDIOVERTER-DEFIBRILLATOR IN SITU: Primary | ICD-10-CM

## 2018-12-11 ENCOUNTER — APPOINTMENT (OUTPATIENT)
Dept: LAB | Facility: CLINIC | Age: 83
End: 2018-12-11
Attending: INTERNAL MEDICINE
Payer: COMMERCIAL

## 2018-12-11 ENCOUNTER — HOSPITAL ENCOUNTER (OUTPATIENT)
Facility: CLINIC | Age: 83
Discharge: HOME | End: 2018-12-11
Attending: INTERNAL MEDICINE | Admitting: INTERNAL MEDICINE
Payer: COMMERCIAL

## 2018-12-11 ENCOUNTER — DOCUMENTATION ONLY (OUTPATIENT)
Dept: CARDIOLOGY | Facility: CLINIC | Age: 83
End: 2018-12-11

## 2018-12-11 ENCOUNTER — APPOINTMENT (OUTPATIENT)
Dept: MEDSURG UNIT | Facility: CLINIC | Age: 83
End: 2018-12-11
Attending: INTERNAL MEDICINE
Payer: COMMERCIAL

## 2018-12-11 VITALS
TEMPERATURE: 97.7 F | RESPIRATION RATE: 18 BRPM | SYSTOLIC BLOOD PRESSURE: 118 MMHG | BODY MASS INDEX: 22.54 KG/M2 | HEIGHT: 68 IN | DIASTOLIC BLOOD PRESSURE: 66 MMHG | HEART RATE: 77 BPM | WEIGHT: 148.7 LBS | OXYGEN SATURATION: 100 %

## 2018-12-11 DIAGNOSIS — Z95.0 S/P PLACEMENT OF CARDIAC PACEMAKER: Primary | ICD-10-CM

## 2018-12-11 DIAGNOSIS — Z95.810 S/P ICD (INTERNAL CARDIAC DEFIBRILLATOR) PROCEDURE: ICD-10-CM

## 2018-12-11 DIAGNOSIS — Z95.810 AUTOMATIC IMPLANTABLE CARDIOVERTER-DEFIBRILLATOR IN SITU: ICD-10-CM

## 2018-12-11 DIAGNOSIS — I47.29 NSVT (NONSUSTAINED VENTRICULAR TACHYCARDIA) (H): ICD-10-CM

## 2018-12-11 LAB
ANION GAP SERPL CALCULATED.3IONS-SCNC: 6 MMOL/L (ref 3–14)
BUN SERPL-MCNC: 49 MG/DL (ref 7–30)
CALCIUM SERPL-MCNC: 9.4 MG/DL (ref 8.5–10.1)
CHLORIDE SERPL-SCNC: 109 MMOL/L (ref 94–109)
CO2 SERPL-SCNC: 26 MMOL/L (ref 20–32)
CREAT SERPL-MCNC: 1.43 MG/DL (ref 0.66–1.25)
ERYTHROCYTE [DISTWIDTH] IN BLOOD BY AUTOMATED COUNT: 14.9 % (ref 10–15)
GFR SERPL CREATININE-BSD FRML MDRD: 47 ML/MIN/1.7M2
GLUCOSE SERPL-MCNC: 99 MG/DL (ref 70–99)
HCT VFR BLD AUTO: 31.4 % (ref 40–53)
HGB BLD-MCNC: 9.8 G/DL (ref 13.3–17.7)
INR PPP: 1.65 (ref 0.86–1.14)
INTERPRETATION ECG - MUSE: NORMAL
MCH RBC QN AUTO: 29.2 PG (ref 26.5–33)
MCHC RBC AUTO-ENTMCNC: 31.2 G/DL (ref 31.5–36.5)
MCV RBC AUTO: 94 FL (ref 78–100)
PLATELET # BLD AUTO: 215 10E9/L (ref 150–450)
POTASSIUM SERPL-SCNC: 5 MMOL/L (ref 3.4–5.3)
RBC # BLD AUTO: 3.36 10E12/L (ref 4.4–5.9)
SODIUM SERPL-SCNC: 141 MMOL/L (ref 133–144)
WBC # BLD AUTO: 8.2 10E9/L (ref 4–11)

## 2018-12-11 PROCEDURE — 25000128 H RX IP 250 OP 636: Performed by: INTERNAL MEDICINE

## 2018-12-11 PROCEDURE — 80048 BASIC METABOLIC PNL TOTAL CA: CPT | Performed by: INTERNAL MEDICINE

## 2018-12-11 PROCEDURE — 40000172 ZZH STATISTIC PROCEDURE PREP ONLY

## 2018-12-11 PROCEDURE — 40000065 ZZH STATISTIC EKG NON-CHARGEABLE

## 2018-12-11 PROCEDURE — 85027 COMPLETE CBC AUTOMATED: CPT | Performed by: INTERNAL MEDICINE

## 2018-12-11 PROCEDURE — 25000125 ZZHC RX 250: Performed by: INTERNAL MEDICINE

## 2018-12-11 PROCEDURE — 33263 RMVL & RPLCMT DFB GEN 2 LEAD: CPT | Performed by: INTERNAL MEDICINE

## 2018-12-11 PROCEDURE — 99152 MOD SED SAME PHYS/QHP 5/>YRS: CPT

## 2018-12-11 PROCEDURE — 27210794 ZZH OR GENERAL SUPPLY STERILE: Performed by: INTERNAL MEDICINE

## 2018-12-11 PROCEDURE — 27210784 ZZH KIT PACEMAKER CR8: Performed by: INTERNAL MEDICINE

## 2018-12-11 PROCEDURE — 93010 ELECTROCARDIOGRAM REPORT: CPT | Performed by: INTERNAL MEDICINE

## 2018-12-11 PROCEDURE — 99152 MOD SED SAME PHYS/QHP 5/>YRS: CPT | Performed by: INTERNAL MEDICINE

## 2018-12-11 PROCEDURE — 85610 PROTHROMBIN TIME: CPT | Performed by: INTERNAL MEDICINE

## 2018-12-11 PROCEDURE — C1721 AICD, DUAL CHAMBER: HCPCS | Performed by: INTERNAL MEDICINE

## 2018-12-11 PROCEDURE — 36415 COLL VENOUS BLD VENIPUNCTURE: CPT | Performed by: INTERNAL MEDICINE

## 2018-12-11 PROCEDURE — 99153 MOD SED SAME PHYS/QHP EA: CPT

## 2018-12-11 DEVICE — IMPLANTABLE DEVICE: Type: IMPLANTABLE DEVICE | Status: FUNCTIONAL

## 2018-12-11 RX ORDER — LIDOCAINE 40 MG/G
CREAM TOPICAL
Status: DISCONTINUED | OUTPATIENT
Start: 2018-12-11 | End: 2018-12-11 | Stop reason: HOSPADM

## 2018-12-11 RX ORDER — CEFAZOLIN SODIUM 2 G/100ML
INJECTION, SOLUTION INTRAVENOUS
Status: DISCONTINUED | OUTPATIENT
Start: 2018-12-11 | End: 2018-12-11 | Stop reason: HOSPADM

## 2018-12-11 RX ORDER — CEPHALEXIN 500 MG/1
500 TABLET ORAL 3 TIMES DAILY
Qty: 15 TABLET | Refills: 0 | Status: ON HOLD | OUTPATIENT
Start: 2018-12-11 | End: 2019-02-07

## 2018-12-11 RX ORDER — OXYCODONE AND ACETAMINOPHEN 5; 325 MG/1; MG/1
1 TABLET ORAL EVERY 4 HOURS PRN
Status: DISCONTINUED | OUTPATIENT
Start: 2018-12-11 | End: 2018-12-11 | Stop reason: HOSPADM

## 2018-12-11 RX ORDER — CLINDAMYCIN PHOSPHATE 900 MG/50ML
900 INJECTION, SOLUTION INTRAVENOUS
Status: COMPLETED | OUTPATIENT
Start: 2018-12-11 | End: 2018-12-11

## 2018-12-11 RX ORDER — NALOXONE HYDROCHLORIDE 0.4 MG/ML
.1-.4 INJECTION, SOLUTION INTRAMUSCULAR; INTRAVENOUS; SUBCUTANEOUS
Status: DISCONTINUED | OUTPATIENT
Start: 2018-12-11 | End: 2018-12-11 | Stop reason: HOSPADM

## 2018-12-11 RX ORDER — FENTANYL CITRATE 50 UG/ML
INJECTION, SOLUTION INTRAMUSCULAR; INTRAVENOUS
Status: DISCONTINUED | OUTPATIENT
Start: 2018-12-11 | End: 2018-12-11 | Stop reason: HOSPADM

## 2018-12-11 RX ADMIN — FENTANYL CITRATE 50 MCG: 50 INJECTION, SOLUTION INTRAMUSCULAR; INTRAVENOUS at 10:39

## 2018-12-11 RX ADMIN — CLINDAMYCIN PHOSPHATE 900 MG: 18 INJECTION, SOLUTION INTRAVENOUS at 09:49

## 2018-12-11 RX ADMIN — MIDAZOLAM 1 MG: 1 INJECTION INTRAMUSCULAR; INTRAVENOUS at 10:39

## 2018-12-11 ASSESSMENT — MIFFLIN-ST. JEOR: SCORE: 1344

## 2018-12-11 NOTE — DISCHARGE INSTRUCTIONS
Home Care after an ICD Battery Change    Wound care:  Keep your incision (surgery wound) dry for 3 days.  After 3 days, you may remove the outer bandage.  Keep the strips of tape on.  They will be removed at your clinic visit.  Check for signs of infection each day.  These include increased redness, swelling, drainage or a fever over 101 F (38.3 C).  Call us immediately if you see any of these signs.  If there are no signs of infection, you may shower in 3 days.  Do not submerge the incision (in a bath tub, hot tub, or swimming pool) until fully healed.    Pain:   You may have mild to moderate pain for 3 to 5 days.  Take acetaminophen (Tylenol) or ibuprofen (Advil) for the pain.  Call us if the pain is severe or lasts more than 5 days.    Activity:  After 24 hours, slowly return to your normal activities.  Healing will take 4 to 6 weeks.    Do not drive for 24 hours.    For 3 days, do not raise your affected arm above your shoulder.    For 10 days, do not use your affected arm to push, pull or lift anything over 10 pounds.    Avoid climbing a ladder alone. It is best to stay within 4 feet off the ground.    Do not go swimming or boating alone.     Avoid anything that may cause rough contact or a hard hit to your chest.  This includes football, hockey, and other contact sports.    Follow Up Visits:  Return to the clinic in 7 to 10 days to have your device and wound checked.      Telling others about your device:  Before you have any medical tests or treatments, tell the doctors, dentists, and other care providers about your device.  There are a few tests and treatments that may interfere with your device.  (These include MRI, radiation therapy, electrocautery, and others.)  Your care team may need to take special steps to keep you safe.  Before you leave the hospital, you will receive a temporary ID card.  A permanent card will be mailed to you about 6 to 8 weeks later.  Always carry the ID card with you.  It has  important details about your device.  You should also get a MedicAlert ID.  Please ask us for a MedicAlert brochure, or go to [email or web address].    Safety near electrical equipment:  All of these are safe to use when in good repair -    Microwaves    Radios    Cordless phone    Remote controls    Small electrical tools  Cell phones: Keep cell phones at least 6 inches from your device.  Do not carry the phone in a pocket near your device.  Security hoskins: It is okay to walk through security hoskins at the airports and department stores.  Tell airport security that you have a defibrillator.  They should keep the screening wand at least 6 inches from your device.  Full-body scanners are safe.    Avoid the following:    MRI tests in the hospital unless you have a MRI safe defibrillator.    Arc welding, chain saws and high-powered industrial or commercial tools.    Power lines, power plants and large power generators.    Electric body fat scales.    Magnetic mattress pads or pillow.    What to do after a shock from your ICD:  1.      Stop what you re doing and rest.  2.      If you feel fine before and after the shock, call the device clinic.  3.      If you feel unwell or receive more than one shock, call 911 or go to the          emergency room.  A shock could mean that your condition has changed and you may need to see a doctor.    Questions?  Please call [facility].    General inquiry: [phone]    Device Nurse:          Business Hours:  [phone]                          After Hours:  [phone]   Choose option 4, then ask for the                                          on-call device nurse at job code [id number].    Your next device clinic appointment is scheduled on:     ___________________________ at _____________.                                                [facility]  [facility] - [facility] [address]  [address] [place] [address]

## 2018-12-11 NOTE — OP NOTE
EP PROCEDURE NOTE    Procedures:  1. ICD generator change.      Attending: Deedee Baird MD, MS  EP Fellow: None  Procedure Date: 12/11/2018    Pre-operative Diagnosis: ICD generator has reached RRT, secondary prevention of sudden cardiac death due to polymorphic VT  Post-operative diagnosis:   Successful ICD generator change,  Secondary prevention of SCD.  Complications: None.     Fluoroscopy time/dose: None      Clinical Profile:  Pt is an 82 year old male with several medical problems including HTN, HL, CKD3, CAD s/p CABG x2, DESx2, polymorphic VT s/p ICD placement, h/o atrial fibrillation and atrial flutter s/p cavotricuspid isthmus ablation (6/2014) and right atrial appendage atrial tachycardia ablation (9/2014).      Patient presents to the EP lab for ICD generator change because his ICD battery has reached 2.61 V, RRT is 2.63 V.       PROCEDURE  The risks and benefits of the procedure were explained to the patient in full.  The risks of the procedure include, but are not limited to: pain, bleeding, blood transfusion reactions, infection, pneumothorax, damage to existing pacemaker leads and device, possible lead revision, perforation of vessels or heart, pericardial effusion, and death. Informed Consent was obtained. The patient was brought to the EP lab in a fasting and hemodynamically stable condition. The patient was prepped and draped in a sterile fashion. The chest wall was anesthetized with 2% Lidocaine.   Sedation: This procedure was performed under moderate sedation under the supervision of the the Staff Physician. The patient was assessed immediately prior to administering sedation. The patient received Versed and Fentanyl for a total procedural sedation time of 35 minutes. Heart rate, blood pressure, oxygen saturation, and patient responses were monitored throughout the procedure with the assistance of the RN.   Fluoroscopy was used to check the position of the leads and device generator.   An  approximately 5-cm incision was made over the old incision line. Electrocautery was used to access the pocket, with adequate hemostasis being provided throughout. After reaching the capsule, the previous suture material was ligated and removed. The pulse generator was then removed. The wrench tool was used to detach the leads from the pulse generator. The chronic leads were then securely attached to the new pulse generator. The pocket was then vigorously washed with antibiotic solution. The new device generator was then interrogated and the leads showed adequate sensing, pacing thresholds and impedance. The pulse generator was then placed in the pocket.     DFT testing was not performed due to the high risk of refractory VT or VF.  Patient and wife declined DFT testing.    The pocket was then closed in 3 layers using 2-0 Vicryl for the deep layers and 4-0 Vicryl for the subcuticular layer. Steri-Strips and a dressing were then applied over the incision site, and the patient was transported to a monitored bed.    Dr. Baird was present throughout the entire procedure.    EQUIPMENT  1. The new Generator is a [employer], model number [id number], serial [id number].   2. The explanted device a Medtronic, Serial [id number], implanted May 11, 2012   3. The chronic RA Lead is a Medtronic, Serial [id number], implanted May 11, 2012   4. The chronic RV Lead is a Medtronic, Serial [id number], implanted May 11, 2012.    MEASUREMENTS  The RA lead is sensing P waves of 1.1 mV and a pacing threshold of 0.75 V @ 0.4 msec with an impedance of 418 ohms.  The RV lead is sensing R waves of 8.4 mV and a pacing threshold of 1.0 V @ 0.4 msec with an impedance of 399 ohms.     FINAL PROGRAMMING  Walker Settings:  -Pacing mode: AAIR <-> DDDR  -Lower Rate Limit: 60 Ppm.  -Upper Rate Limit: 130 Ppm.  Tachy Settings:  -VF Zone: set at 200 bpm, Therapy: Defibrillation shock, 35 J, x6.  -VT zone: set at 188 bpm, Therapy: 1 burst at 88%, 5 CV at 35  J.  -VT monitor zone: 140 bpm    PLAN  1. Wound care.  2. Antibiotics.  3.  Discharge today and follow-up with device nurses       I was present during the entire procedure.    Deedee Baird MD, MS  EP/Cardiology Staff

## 2018-12-11 NOTE — PROGRESS NOTES
Scheduling Confirmation    Surgery Procedure Scheduled:   ICD placement    Surgeon:   Brie    Date of Surgery:   12/11/18    Time of Surgery:    9a    Location: Cath lab    [facility] with:   Mary

## 2018-12-11 NOTE — PROGRESS NOTES
IV removed.  Pt. Able to ambulate, eat and void.  Discharge instructions given and questions answered.  Pt. Able to verbalize understanding.  Pt. Has all belongings and has a ride home.

## 2018-12-11 NOTE — H&P
H&P from Dr. Baird's Office Visit on 12/3/18 reviewed. Following today's exam there are no interval changes.       MARIA LUISA Murillo CNP  Electrophysiology Consult Service  Pager: [phone]

## 2018-12-12 ENCOUNTER — TELEPHONE (OUTPATIENT)
Dept: SURGERY | Facility: CLINIC | Age: 83
End: 2018-12-12

## 2018-12-12 NOTE — TELEPHONE ENCOUNTER
[facility] Call Center    Phone Message    May a detailed message be left on voicemail: yes    Reason for Call: Other: Needs to schedule for surgery.      Action Taken: Message routed to:  [facility] ([facility] SARTHAK

## 2018-12-13 ENCOUNTER — ANTICOAGULATION THERAPY VISIT (OUTPATIENT)
Dept: ANTICOAGULATION | Facility: CLINIC | Age: 83
End: 2018-12-13

## 2018-12-13 DIAGNOSIS — I48.91 ATRIAL FIBRILLATION (H): ICD-10-CM

## 2018-12-13 DIAGNOSIS — C18.9 COLON CANCER (H): Primary | ICD-10-CM

## 2018-12-13 RX ORDER — NEOMYCIN SULFATE 500 MG/1
1000 TABLET ORAL EVERY 8 HOURS
Qty: 6 TABLET | Refills: 0 | Status: ON HOLD | OUTPATIENT
Start: 2018-12-13 | End: 2019-02-07

## 2018-12-13 RX ORDER — POLYETHYLENE GLYCOL 3350 17 G/17G
238 POWDER, FOR SOLUTION ORAL SEE ADMIN INSTRUCTIONS
Qty: 238 G | Refills: 0 | Status: ON HOLD | OUTPATIENT
Start: 2018-12-13 | End: 2019-02-07

## 2018-12-13 RX ORDER — ONDANSETRON 4 MG/1
4 TABLET, FILM COATED ORAL EVERY 6 HOURS PRN
Qty: 3 TABLET | Refills: 0 | Status: ON HOLD | OUTPATIENT
Start: 2018-12-13 | End: 2019-02-07

## 2018-12-13 RX ORDER — METRONIDAZOLE 500 MG/1
500 TABLET ORAL EVERY 8 HOURS
Qty: 3 TABLET | Refills: 0 | Status: ON HOLD | OUTPATIENT
Start: 2018-12-13 | End: 2019-02-07

## 2018-12-13 NOTE — TELEPHONE ENCOUNTER
Patient is scheduled for surgery with Dr. Abbey Cummings       Spoke or left message with: I spoke to the patient and his wife     Date of Surgery: 02/01/19    [facility]    H&P: Scheduled with PAC 01/18/19 at 1:30pm    Post op: NA    Additional imaging/appointments: N    Surgery packet sent: will be mailed out closer to his surgery date     Additional comments:  The patient is aware they need a pre-op at least a couple of weeks before surgery date. We went over the patient needing a  and someone to stay with them for 24 hours after the surgery. The patient was given my direct number for any questions [phone]

## 2018-12-13 NOTE — PROGRESS NOTES
Noe had an ICD Generator Change on 12/12/18,  He did not hold his warfarin for the procedure.  Will be on Keflex for 5 days.  I spoke with Rica and gave her the INR results and warfarin doses until the next INR

## 2018-12-18 ENCOUNTER — ANCILLARY PROCEDURE (OUTPATIENT)
Dept: CARDIOLOGY | Facility: CLINIC | Age: 83
End: 2018-12-18
Attending: INTERNAL MEDICINE
Payer: COMMERCIAL

## 2018-12-18 DIAGNOSIS — I47.20 VENTRICULAR TACHYARRHYTHMIA (H): ICD-10-CM

## 2018-12-18 LAB
ICDO DEVICE COMMENTS: NORMAL
MDC_IDC_LEAD_IMPLANT_DT: NORMAL
MDC_IDC_LEAD_IMPLANT_DT: NORMAL
MDC_IDC_LEAD_LOCATION: NORMAL
MDC_IDC_LEAD_LOCATION: NORMAL
MDC_IDC_LEAD_LOCATION_DETAIL_1: NORMAL
MDC_IDC_LEAD_LOCATION_DETAIL_1: NORMAL
MDC_IDC_LEAD_MFG: NORMAL
MDC_IDC_LEAD_MFG: NORMAL
MDC_IDC_LEAD_MODEL: NORMAL
MDC_IDC_LEAD_MODEL: NORMAL
MDC_IDC_LEAD_POLARITY_TYPE: NORMAL
MDC_IDC_LEAD_POLARITY_TYPE: NORMAL
MDC_IDC_LEAD_SERIAL: NORMAL
MDC_IDC_LEAD_SERIAL: NORMAL
MDC_IDC_MSMT_BATTERY_DTM: NORMAL
MDC_IDC_MSMT_BATTERY_REMAINING_LONGEVITY: 119 MO
MDC_IDC_MSMT_BATTERY_RRT_TRIGGER: 2.73
MDC_IDC_MSMT_BATTERY_STATUS: NORMAL
MDC_IDC_MSMT_BATTERY_VOLTAGE: 3.09 V
MDC_IDC_MSMT_LEADCHNL_RA_IMPEDANCE_VALUE: 456 OHM
MDC_IDC_MSMT_LEADCHNL_RA_PACING_THRESHOLD_AMPLITUDE: 0.5 V
MDC_IDC_MSMT_LEADCHNL_RA_PACING_THRESHOLD_AMPLITUDE: 0.5 V
MDC_IDC_MSMT_LEADCHNL_RA_PACING_THRESHOLD_PULSEWIDTH: 0.4 MS
MDC_IDC_MSMT_LEADCHNL_RA_PACING_THRESHOLD_PULSEWIDTH: 0.4 MS
MDC_IDC_MSMT_LEADCHNL_RA_SENSING_INTR_AMPL: 0.75 MV
MDC_IDC_MSMT_LEADCHNL_RA_SENSING_INTR_AMPL: 1.12 MV
MDC_IDC_MSMT_LEADCHNL_RV_IMPEDANCE_VALUE: 304 OHM
MDC_IDC_MSMT_LEADCHNL_RV_IMPEDANCE_VALUE: 361 OHM
MDC_IDC_MSMT_LEADCHNL_RV_PACING_THRESHOLD_AMPLITUDE: 0.88 V
MDC_IDC_MSMT_LEADCHNL_RV_PACING_THRESHOLD_AMPLITUDE: 1 V
MDC_IDC_MSMT_LEADCHNL_RV_PACING_THRESHOLD_PULSEWIDTH: 0.4 MS
MDC_IDC_MSMT_LEADCHNL_RV_PACING_THRESHOLD_PULSEWIDTH: 0.4 MS
MDC_IDC_MSMT_LEADCHNL_RV_SENSING_INTR_AMPL: 7.12 MV
MDC_IDC_MSMT_LEADCHNL_RV_SENSING_INTR_AMPL: 7.5 MV
MDC_IDC_PG_IMPLANT_DTM: NORMAL
MDC_IDC_PG_MFG: NORMAL
MDC_IDC_PG_MODEL: NORMAL
MDC_IDC_PG_SERIAL: NORMAL
MDC_IDC_PG_TYPE: NORMAL
MDC_IDC_SESS_CLINIC_NAME: NORMAL
MDC_IDC_SESS_DTM: NORMAL
MDC_IDC_SESS_TYPE: NORMAL
MDC_IDC_SET_BRADY_AT_MODE_SWITCH_RATE: 171 {BEATS}/MIN
MDC_IDC_SET_BRADY_HYSTRATE: NORMAL
MDC_IDC_SET_BRADY_LOWRATE: 60 {BEATS}/MIN
MDC_IDC_SET_BRADY_MAX_SENSOR_RATE: 130 {BEATS}/MIN
MDC_IDC_SET_BRADY_MAX_TRACKING_RATE: 130 {BEATS}/MIN
MDC_IDC_SET_BRADY_MODE: NORMAL
MDC_IDC_SET_BRADY_PAV_DELAY_LOW: 180 MS
MDC_IDC_SET_BRADY_SAV_DELAY_LOW: 150 MS
MDC_IDC_SET_LEADCHNL_RA_PACING_AMPLITUDE: 1.75 V
MDC_IDC_SET_LEADCHNL_RA_PACING_ANODE_ELECTRODE_1: NORMAL
MDC_IDC_SET_LEADCHNL_RA_PACING_ANODE_LOCATION_1: NORMAL
MDC_IDC_SET_LEADCHNL_RA_PACING_CAPTURE_MODE: NORMAL
MDC_IDC_SET_LEADCHNL_RA_PACING_CATHODE_ELECTRODE_1: NORMAL
MDC_IDC_SET_LEADCHNL_RA_PACING_CATHODE_LOCATION_1: NORMAL
MDC_IDC_SET_LEADCHNL_RA_PACING_POLARITY: NORMAL
MDC_IDC_SET_LEADCHNL_RA_PACING_PULSEWIDTH: 0.4 MS
MDC_IDC_SET_LEADCHNL_RA_SENSING_ANODE_ELECTRODE_1: NORMAL
MDC_IDC_SET_LEADCHNL_RA_SENSING_ANODE_LOCATION_1: NORMAL
MDC_IDC_SET_LEADCHNL_RA_SENSING_CATHODE_ELECTRODE_1: NORMAL
MDC_IDC_SET_LEADCHNL_RA_SENSING_CATHODE_LOCATION_1: NORMAL
MDC_IDC_SET_LEADCHNL_RA_SENSING_POLARITY: NORMAL
MDC_IDC_SET_LEADCHNL_RA_SENSING_SENSITIVITY: 0.3 MV
MDC_IDC_SET_LEADCHNL_RV_PACING_AMPLITUDE: 2 V
MDC_IDC_SET_LEADCHNL_RV_PACING_ANODE_ELECTRODE_1: NORMAL
MDC_IDC_SET_LEADCHNL_RV_PACING_ANODE_LOCATION_1: NORMAL
MDC_IDC_SET_LEADCHNL_RV_PACING_CAPTURE_MODE: NORMAL
MDC_IDC_SET_LEADCHNL_RV_PACING_CATHODE_ELECTRODE_1: NORMAL
MDC_IDC_SET_LEADCHNL_RV_PACING_CATHODE_LOCATION_1: NORMAL
MDC_IDC_SET_LEADCHNL_RV_PACING_POLARITY: NORMAL
MDC_IDC_SET_LEADCHNL_RV_PACING_PULSEWIDTH: 0.4 MS
MDC_IDC_SET_LEADCHNL_RV_SENSING_ANODE_ELECTRODE_1: NORMAL
MDC_IDC_SET_LEADCHNL_RV_SENSING_ANODE_LOCATION_1: NORMAL
MDC_IDC_SET_LEADCHNL_RV_SENSING_CATHODE_ELECTRODE_1: NORMAL
MDC_IDC_SET_LEADCHNL_RV_SENSING_CATHODE_LOCATION_1: NORMAL
MDC_IDC_SET_LEADCHNL_RV_SENSING_POLARITY: NORMAL
MDC_IDC_SET_LEADCHNL_RV_SENSING_SENSITIVITY: 0.45 MV
MDC_IDC_SET_ZONE_DETECTION_BEATS_DENOMINATOR: 24 {BEATS}
MDC_IDC_SET_ZONE_DETECTION_BEATS_NUMERATOR: 18 {BEATS}
MDC_IDC_SET_ZONE_DETECTION_INTERVAL: 300 MS
MDC_IDC_SET_ZONE_DETECTION_INTERVAL: 320 MS
MDC_IDC_SET_ZONE_DETECTION_INTERVAL: 350 MS
MDC_IDC_SET_ZONE_DETECTION_INTERVAL: 430 MS
MDC_IDC_SET_ZONE_DETECTION_INTERVAL: NORMAL
MDC_IDC_SET_ZONE_TYPE: NORMAL
MDC_IDC_STAT_AT_BURDEN_PERCENT: 0 %
MDC_IDC_STAT_AT_DTM_END: NORMAL
MDC_IDC_STAT_AT_DTM_START: NORMAL
MDC_IDC_STAT_BRADY_AP_VP_PERCENT: 7.44 %
MDC_IDC_STAT_BRADY_AP_VS_PERCENT: 58 %
MDC_IDC_STAT_BRADY_AS_VP_PERCENT: 8.72 %
MDC_IDC_STAT_BRADY_AS_VS_PERCENT: 25.84 %
MDC_IDC_STAT_BRADY_DTM_END: NORMAL
MDC_IDC_STAT_BRADY_DTM_START: NORMAL
MDC_IDC_STAT_BRADY_RA_PERCENT_PACED: 64.42 %
MDC_IDC_STAT_BRADY_RV_PERCENT_PACED: 16.33 %
MDC_IDC_STAT_EPISODE_RECENT_COUNT: 0
MDC_IDC_STAT_EPISODE_RECENT_COUNT_DTM_END: NORMAL
MDC_IDC_STAT_EPISODE_RECENT_COUNT_DTM_START: NORMAL
MDC_IDC_STAT_EPISODE_TOTAL_COUNT: 0
MDC_IDC_STAT_EPISODE_TOTAL_COUNT_DTM_END: NORMAL
MDC_IDC_STAT_EPISODE_TOTAL_COUNT_DTM_START: NORMAL
MDC_IDC_STAT_EPISODE_TYPE: NORMAL
MDC_IDC_STAT_TACHYTHERAPY_ATP_DELIVERED_RECENT: 0
MDC_IDC_STAT_TACHYTHERAPY_ATP_DELIVERED_TOTAL: 0
MDC_IDC_STAT_TACHYTHERAPY_RECENT_DTM_END: NORMAL
MDC_IDC_STAT_TACHYTHERAPY_RECENT_DTM_START: NORMAL
MDC_IDC_STAT_TACHYTHERAPY_SHOCKS_ABORTED_RECENT: 0
MDC_IDC_STAT_TACHYTHERAPY_SHOCKS_ABORTED_TOTAL: 0
MDC_IDC_STAT_TACHYTHERAPY_SHOCKS_DELIVERED_RECENT: 0
MDC_IDC_STAT_TACHYTHERAPY_SHOCKS_DELIVERED_TOTAL: 0
MDC_IDC_STAT_TACHYTHERAPY_TOTAL_DTM_END: NORMAL
MDC_IDC_STAT_TACHYTHERAPY_TOTAL_DTM_START: NORMAL

## 2018-12-19 DIAGNOSIS — Z79.01 LONG TERM CURRENT USE OF ANTICOAGULANT THERAPY: ICD-10-CM

## 2018-12-19 DIAGNOSIS — I48.0 PAROXYSMAL ATRIAL FIBRILLATION (H): ICD-10-CM

## 2018-12-19 DIAGNOSIS — C18.9 MALIGNANT NEOPLASM OF COLON, UNSPECIFIED PART OF COLON (H): ICD-10-CM

## 2018-12-19 LAB
ALBUMIN SERPL-MCNC: 3.3 G/DL (ref 3.4–5)
ALP SERPL-CCNC: 84 U/L (ref 40–150)
ALT SERPL W P-5'-P-CCNC: 25 U/L (ref 0–70)
ANION GAP SERPL CALCULATED.3IONS-SCNC: 10 MMOL/L (ref 3–14)
AST SERPL W P-5'-P-CCNC: 35 U/L (ref 0–45)
BASOPHILS # BLD AUTO: 0 10E9/L (ref 0–0.2)
BASOPHILS NFR BLD AUTO: 0 %
BILIRUB SERPL-MCNC: 0.3 MG/DL (ref 0.2–1.3)
BUN SERPL-MCNC: 45 MG/DL (ref 7–30)
BURR CELLS BLD QL SMEAR: SLIGHT
CALCIUM SERPL-MCNC: 8.6 MG/DL (ref 8.5–10.1)
CEA SERPL-MCNC: 3.3 UG/L (ref 0–2.5)
CHLORIDE SERPL-SCNC: 107 MMOL/L (ref 94–109)
CO2 SERPL-SCNC: 22 MMOL/L (ref 20–32)
CREAT SERPL-MCNC: 1.58 MG/DL (ref 0.66–1.25)
DIFFERENTIAL METHOD BLD: ABNORMAL
EOSINOPHIL # BLD AUTO: 0 10E9/L (ref 0–0.7)
EOSINOPHIL NFR BLD AUTO: 0 %
ERYTHROCYTE [DISTWIDTH] IN BLOOD BY AUTOMATED COUNT: 14.6 % (ref 10–15)
GFR SERPL CREATININE-BSD FRML MDRD: 40 ML/MIN/{1.73_M2}
GLUCOSE SERPL-MCNC: 78 MG/DL (ref 70–99)
HCT VFR BLD AUTO: 28.6 % (ref 40–53)
HGB BLD-MCNC: 9 G/DL (ref 13.3–17.7)
INR PPP: 2.03 (ref 0.86–1.14)
LYMPHOCYTES # BLD AUTO: 0.5 10E9/L (ref 0.8–5.3)
LYMPHOCYTES NFR BLD AUTO: 13.9 %
MCH RBC QN AUTO: 28.7 PG (ref 26.5–33)
MCHC RBC AUTO-ENTMCNC: 31.5 G/DL (ref 31.5–36.5)
MCV RBC AUTO: 91 FL (ref 78–100)
MONOCYTES # BLD AUTO: 0.3 10E9/L (ref 0–1.3)
MONOCYTES NFR BLD AUTO: 8.7 %
NEUTROPHILS # BLD AUTO: 2.9 10E9/L (ref 1.6–8.3)
NEUTROPHILS NFR BLD AUTO: 77.4 %
OVALOCYTES BLD QL SMEAR: ABNORMAL
PLATELET # BLD AUTO: 188 10E9/L (ref 150–450)
PLATELET # BLD EST: ABNORMAL 10*3/UL
POIKILOCYTOSIS BLD QL SMEAR: ABNORMAL
POTASSIUM SERPL-SCNC: 4.4 MMOL/L (ref 3.4–5.3)
PROT SERPL-MCNC: 7.4 G/DL (ref 6.8–8.8)
RBC # BLD AUTO: 3.14 10E12/L (ref 4.4–5.9)
SODIUM SERPL-SCNC: 138 MMOL/L (ref 133–144)
WBC # BLD AUTO: 3.7 10E9/L (ref 4–11)

## 2018-12-20 ENCOUNTER — ANTICOAGULATION THERAPY VISIT (OUTPATIENT)
Dept: ANTICOAGULATION | Facility: CLINIC | Age: 83
End: 2018-12-20

## 2018-12-20 DIAGNOSIS — I48.91 ATRIAL FIBRILLATION (H): ICD-10-CM

## 2018-12-20 NOTE — PROGRESS NOTES
ANTICOAGULATION FOLLOW-UP CLINIC VISIT    Patient Name:  Noe Florence  Date:  2018  Contact Type:  Telephone    SUBJECTIVE:     Patient Findings     Comments:   Spoke to patient and spouse.  Did not recommend any changes to patient's dose of Coumadin.  Will check at appointment on 19.  Note, Sha has a procedure scheduled for 19.  Please review with patient and spouse. Recovering well post ICD/Generator change on .  No bleeding at site.           OBJECTIVE    INR   Date Value Ref Range Status   2018 2.03 (H) 0.86 - 1.14 Final       ASSESSMENT / PLAN  INR assessment THER    Recheck INR In: 3 WEEKS    INR Location Clinic      Anticoagulation Summary  As of 2018    INR goal:   2.0-3.0   TTR:   67.2 % (2.5 y)   INR used for dosin.03 (2018)   Warfarin maintenance plan:   5 mg (5 mg x 1) every day   Full warfarin instructions:   5 mg every day   Weekly warfarin total:   35 mg   No change documented:   Ciro Sharp RN   Plan last modified:   Nguyen Zuniga RN (2017)   Next INR check:   2019   Priority:   INR   Target end date:   Indefinite    Indications    Atrial fibrillation (H) [I48.91] [I48.91]  Long-term (current) use of anticoagulants [Z79.01] [Z79.01]             Anticoagulation Episode Summary     INR check location:       Preferred lab:       Send INR reminders to:   [facility]    Comments:   Patient contact number: [phone]   Can leave results with Rica (friend)      Anticoagulation Care Providers     Provider Role Specialty Phone number    Deedee Baird MD [facility] Cardiology [phone]            See the Encounter Report to view Anticoagulation Flowsheet and Dosing Calendar (Go to Encounters tab in chart review, and find the Anticoagulation Therapy Visit)    Spoke with patient. Gave them their lab results and new warfarin recommendation.  No changes in health, medication, or diet. No missed doses, no falls. No signs or symptoms  of bleed or clotting.    Ciro Sharp, RN

## 2019-01-07 ENCOUNTER — OFFICE VISIT (OUTPATIENT)
Dept: INTERNAL MEDICINE | Facility: CLINIC | Age: 84
End: 2019-01-07
Payer: COMMERCIAL

## 2019-01-07 VITALS
OXYGEN SATURATION: 100 % | WEIGHT: 145 LBS | HEART RATE: 64 BPM | HEIGHT: 68 IN | SYSTOLIC BLOOD PRESSURE: 119 MMHG | BODY MASS INDEX: 21.98 KG/M2 | DIASTOLIC BLOOD PRESSURE: 61 MMHG

## 2019-01-07 DIAGNOSIS — C18.3 MALIGNANT NEOPLASM OF HEPATIC FLEXURE (H): ICD-10-CM

## 2019-01-07 DIAGNOSIS — D64.9 ANEMIA, UNSPECIFIED TYPE: Primary | ICD-10-CM

## 2019-01-07 DIAGNOSIS — E87.5 HYPERKALEMIA: ICD-10-CM

## 2019-01-07 DIAGNOSIS — C18.9 MALIGNANT NEOPLASM OF COLON, UNSPECIFIED PART OF COLON (H): ICD-10-CM

## 2019-01-07 DIAGNOSIS — D64.9 ANEMIA, UNSPECIFIED TYPE: ICD-10-CM

## 2019-01-07 LAB
ALBUMIN UR-MCNC: NEGATIVE MG/DL
ANION GAP SERPL CALCULATED.3IONS-SCNC: 6 MMOL/L (ref 3–14)
APPEARANCE UR: ABNORMAL
BASOPHILS # BLD AUTO: 0 10E9/L (ref 0–0.2)
BASOPHILS NFR BLD AUTO: 0.2 %
BILIRUB UR QL STRIP: NEGATIVE
BUN SERPL-MCNC: 47 MG/DL (ref 7–30)
CALCIUM SERPL-MCNC: 8.2 MG/DL (ref 8.5–10.1)
CEA SERPL-MCNC: 4.2 UG/L (ref 0–2.5)
CHLORIDE SERPL-SCNC: 108 MMOL/L (ref 94–109)
CO2 SERPL-SCNC: 27 MMOL/L (ref 20–32)
COLOR UR AUTO: YELLOW
CREAT SERPL-MCNC: 1.21 MG/DL (ref 0.66–1.25)
DIFFERENTIAL METHOD BLD: ABNORMAL
EOSINOPHIL # BLD AUTO: 0.1 10E9/L (ref 0–0.7)
EOSINOPHIL NFR BLD AUTO: 2 %
ERYTHROCYTE [DISTWIDTH] IN BLOOD BY AUTOMATED COUNT: 14.7 % (ref 10–15)
GFR SERPL CREATININE-BSD FRML MDRD: 55 ML/MIN/{1.73_M2}
GLUCOSE SERPL-MCNC: 97 MG/DL (ref 70–99)
GLUCOSE UR STRIP-MCNC: NEGATIVE MG/DL
HCT VFR BLD AUTO: 28.6 % (ref 40–53)
HGB BLD-MCNC: 8.9 G/DL (ref 13.3–17.7)
HGB UR QL STRIP: NEGATIVE
HYALINE CASTS #/AREA URNS LPF: 1 /LPF (ref 0–2)
IMM GRANULOCYTES # BLD: 0 10E9/L (ref 0–0.4)
IMM GRANULOCYTES NFR BLD: 0.2 %
KETONES UR STRIP-MCNC: NEGATIVE MG/DL
LEUKOCYTE ESTERASE UR QL STRIP: ABNORMAL
LYMPHOCYTES # BLD AUTO: 0.7 10E9/L (ref 0.8–5.3)
LYMPHOCYTES NFR BLD AUTO: 12.5 %
MCH RBC QN AUTO: 28.6 PG (ref 26.5–33)
MCHC RBC AUTO-ENTMCNC: 31.1 G/DL (ref 31.5–36.5)
MCV RBC AUTO: 92 FL (ref 78–100)
MONOCYTES # BLD AUTO: 0.7 10E9/L (ref 0–1.3)
MONOCYTES NFR BLD AUTO: 12.2 %
NEUTROPHILS # BLD AUTO: 4 10E9/L (ref 1.6–8.3)
NEUTROPHILS NFR BLD AUTO: 72.9 %
NITRATE UR QL: NEGATIVE
NRBC # BLD AUTO: 0 10*3/UL
NRBC BLD AUTO-RTO: 0 /100
PH UR STRIP: 5 PH (ref 5–7)
PLATELET # BLD AUTO: 201 10E9/L (ref 150–450)
POTASSIUM SERPL-SCNC: 4.9 MMOL/L (ref 3.4–5.3)
RBC # BLD AUTO: 3.11 10E12/L (ref 4.4–5.9)
RBC #/AREA URNS AUTO: 1 /HPF (ref 0–2)
SODIUM SERPL-SCNC: 141 MMOL/L (ref 133–144)
SOURCE: ABNORMAL
SP GR UR STRIP: 1.02 (ref 1–1.03)
TRANS CELLS #/AREA URNS HPF: <1 /HPF
UROBILINOGEN UR STRIP-MCNC: 0 MG/DL (ref 0–2)
WBC # BLD AUTO: 5.4 10E9/L (ref 4–11)
WBC #/AREA URNS AUTO: 1 /HPF (ref 0–5)

## 2019-01-07 PROCEDURE — 81001 URINALYSIS AUTO W/SCOPE: CPT | Performed by: INTERNAL MEDICINE

## 2019-01-07 PROCEDURE — 80048 BASIC METABOLIC PNL TOTAL CA: CPT | Performed by: INTERNAL MEDICINE

## 2019-01-07 PROCEDURE — 82378 CARCINOEMBRYONIC ANTIGEN: CPT | Performed by: INTERNAL MEDICINE

## 2019-01-07 PROCEDURE — 85025 COMPLETE CBC W/AUTO DIFF WBC: CPT | Performed by: INTERNAL MEDICINE

## 2019-01-07 ASSESSMENT — MIFFLIN-ST. JEOR: SCORE: 1327.09

## 2019-01-07 ASSESSMENT — PAIN SCALES - GENERAL: PAINLEVEL: NO PAIN (0)

## 2019-01-07 NOTE — NURSING NOTE
Chief Complaint   Patient presents with     RECHECK     Pt here for venipuncture blood draw only.        Gricelda Granados MA

## 2019-01-07 NOTE — PROGRESS NOTES
Surgeon: Chanelle Guzmán MD  Fax number for Pre Op Evaluation: N/A  Location of the surgery: [facility]  Date of the surgery: 02/01/2019  Procedure: Laparoscopic Extended Right Hemicolectomy  History of reaction to anesthesia? : NO    HPI  83-year-old presents today for preoperative medical evaluation prior to right hemicolectomy surgery on February 1.  He has a history of previous colon cancer and has had recent issues with weight loss and chronic anemia recent colonoscopy showed evidence of a new tumor at the hepatic flexure and is scheduled now for resection of this and I was asked to evaluate him medically prior to this planned procedure.  He has continued to have a marginal appetite but his weight has been stable over the last several months.  He is using boost as a nutritional supplement with this.  He is exercising largely by walking he is been able to climb stairs without chest pain or dyspnea.  He is recently had cardiology evaluation and a repeat placement of his ICD generator.  He is anticoagulated for history of atrial fibrillation and has tolerated this well along with aspirin without bleeding or other complications.  He is tolerated previous surgery well without any anesthetic or bleeding issues.  Otherwise he is been stable from a medical standpoint he is ambulating with his AFO he did have a slip on the ice recently but otherwise has had no dizziness lightheadedness or recent falls.  Past Medical History:   Diagnosis Date     Advanced open-angle glaucoma      Antiplatelet or antithrombotic long-term use      Atrial fibrillation (H)      CKD (chronic kidney disease), stage III (H) 2005     Colon cancer (H)     Stage II-B colon cancer     Coronary artery disease     s/p CABG x 2, JEREMY x 2     Diverticulitis      Hyperlipidemia      Hypertension      Ischemic cardiomyopathy      MGUS (monoclonal gammopathy of unknown significance)      Nonsenile cataract     BE     Pacemaker      Polymorphic  ventricular tachycardia (H)      Polymyalgia rheumatica (H)      PVD (posterior vitreous detachment), both eyes 2005     S/P ablation of atrial flutter 6/20/14    CTI     Stented coronary artery      SVT (supraventricular tachycardia) (H)     PPM/AICD for NSVT     Upper leg DVT (deep venous thromboembolism), chronic (H)     Left     Weight loss, unintentional 2017    15 lb in 4 months     Past Surgical History:   Procedure Laterality Date     C CABG, ARTERY-VEIN, FOUR  2006    LIMA-LAD, SVG-Rt PDA, SVG-OM2, SVG-Diag 1     C CABG, VEIN, SINGLE  1994    1-vessel CABG, SVG->PDRCA      CATARACT IOL, RT/LT Bilateral      COLONOSCOPY  3/13/2014    Procedure: COMBINED COLONOSCOPY, SINGLE BIOPSY/POLYPECTOMY BY BIOPSY;;  Surgeon: Mary Gerber MD;  Location: [facility]     COLONOSCOPY N/A 6/22/2015    Procedure: COLONOSCOPY;  Surgeon: Marilin Newman MD;  Location: [facility]     COLONOSCOPY N/A 11/7/2018    Procedure: COMBINED COLONOSCOPY, SINGLE OR MULTIPLE BIOPSY/POLYPECTOMY BY BIOPSY;  Surgeon: Roberto Esteban MD;  Location: [facility]     H ABLATION ATRIAL FLUTTER       HEART CATH DRUG ELUTING STENT PLACEMENT  4/19/2012    JEREMY x 2 to LCx     IMPLANT IMPLANTABLE CARDIOVERTER DEFIBRILLATOR  5-    ppm/aicd     KNEE SURGERY      right and left knee surgeries     LAPAROSCOPIC ASSISTED COLECTOMY LEFT (DESCENDING)  4/8/2014    Procedure: LAPAROSCOPIC ASSISTED COLECTOMY LEFT (DESCENDING);  Hand assisted Laparoscopic Sigmoid Colectomy , Laparoscopic mobilization of spleenic flexure *Latex Allergy*Anesthesia General with Block;  Surgeon: Chanelle Guzmán MD;  Location: [facility]     REPAIR VALVE MITRAL  2006     30-mm Medtronic Julian ring      SELECTIVE LASER TRABECULOPLASTY (SLT) OD (RIGHT EYE)  4/10, 1/12,+1/9/13    RE     SELECTIVE LASER TRABECULOPLASTY (SLT) OS (LEFT EYE)  5/2012     SHOULDER SURGERY      right rotator cuff     Family History   Problem Relation Age of Onset     C.A.D. Father       "Anesthesia Reaction No family hx of      Crohn's Disease No family hx of      Ulcerative Colitis No family hx of      Cancer - colorectal No family hx of      Macular Degeneration No family hx of      Cancer No family hx of         No known family hx of skin cancer     Melanoma No family hx of      Skin Cancer No family hx of      Social History     Socioeconomic History     Marital status:      Spouse name: None     Number of children: None     Years of education: None     Highest education level: None   Social Needs     Financial resource strain: None     Food insecurity - worry: None     Food insecurity - inability: None     Transportation needs - medical: None     Transportation needs - non-medical: None   Occupational History     None   Tobacco Use     Smoking status: Former Smoker     Types: Cigarettes, Cigars     Last attempt to quit: 1968     Years since quittin.0     Smokeless tobacco: Never Used   Substance and Sexual Activity     Alcohol use: Yes     Alcohol/week: 0.0 oz     Comment: 2-3 drinks twice weekly     Drug use: No     Sexual activity: None   Other Topics Concern     Parent/sibling w/ CABG, MI or angioplasty before 65F 55M? Not Asked   Social History Narrative     None     Complete review of symptoms negative except as noted above.    Exam:  /61 (BP Location: Right arm, Patient Position: Sitting)   Pulse 64   Ht 1.727 m (5' 7.99\")   Wt 65.8 kg (145 lb)   SpO2 100%   BMI 22.05 kg/m    145 lbs 0 oz  Physical Exam   The patient is alert, oriented with a clear sensorium.   Skin shows numerous seborrheic keratotic lesions no other rashes and good turgor.   Head is normocephalic and atraumatic.   Eyes show PERRLA with miosis preventing good view of optic fundi.   Ears show normal TMs bilaterally.   Mouth shows clear oral mucosa.   Neck shows no nodes, thyromegaly or bruits.   Back is non tender.  Lungs are clear to percussion and auscultation.   Heart shows normal S1 and S2 " without murmur or gallop.   Abdomen is soft and nontender without masses or organomegaly.   Extremities show no edema and no evidence of active synovitis.   Neurologic examination shows cranial nerves II-XII intact. Motor shows 5/5 strength. Reflexes are symmetric.     ASSESSMENT  1 the hepatic flexure colon cancer for surgery  2 chronic kidney disease stable  3 coronary artery disease status post angioplasty asymptomatic  4 history of atrial fibrillation  5 MGUS  6 hypertension controlled  7 hyperlipidemia  8 history of DVT  9 ischemic cardiomyopathy status post ICD    Plan  Patient is stable from a medical and a cardiovascular standpoint and does not need any additional imaging prior to his planned surgery.  Light of his urinary frequency we will check a urine also reassess his BMP and CBC.  He would be considered intermediate risk for this low risk procedure.    This note was completed using Dragon voice recognition software.  Although reviewed after completion, some word and grammatical errors may occur.    Roberto Sarmiento MD  General Internal Medicine  [facility]  [phone]

## 2019-01-07 NOTE — LETTER
1/7/2019      RE: Noe SNIDER Ludivina  [address] [facility] [address]       Surgeon: Chanelle Guzmán MD  Fax number for Pre Op Evaluation: N/A  Location of the surgery: [facility]  Date of the surgery: 02/01/2019  Procedure: Laparoscopic Extended Right Hemicolectomy  History of reaction to anesthesia? : NO    HPI  83-year-old presents today for preoperative medical evaluation prior to right hemicolectomy surgery on February 1.  He has a history of previous colon cancer and has had recent issues with weight loss and chronic anemia recent colonoscopy showed evidence of a new tumor at the hepatic flexure and is scheduled now for resection of this and I was asked to evaluate him medically prior to this planned procedure.  He has continued to have a marginal appetite but his weight has been stable over the last several months.  He is using boost as a nutritional supplement with this.  He is exercising largely by walking he is been able to climb stairs without chest pain or dyspnea.  He is recently had cardiology evaluation and a repeat placement of his ICD generator.  He is anticoagulated for history of atrial fibrillation and has tolerated this well along with aspirin without bleeding or other complications.  He is tolerated previous surgery well without any anesthetic or bleeding issues.  Otherwise he is been stable from a medical standpoint he is ambulating with his AFO he did have a slip on the ice recently but otherwise has had no dizziness lightheadedness or recent falls.  Past Medical History:   Diagnosis Date     Advanced open-angle glaucoma      Antiplatelet or antithrombotic long-term use      Atrial fibrillation (H)      CKD (chronic kidney disease), stage III (H) 2005     Colon cancer (H)     Stage II-B colon cancer     Coronary artery disease     s/p CABG x 2, JEREMY x 2     Diverticulitis      Hyperlipidemia      Hypertension      Ischemic cardiomyopathy      MGUS (monoclonal gammopathy of unknown  significance)      Nonsenile cataract     BE     Pacemaker      Polymorphic ventricular tachycardia (H)      Polymyalgia rheumatica (H)      PVD (posterior vitreous detachment), both eyes 2005     S/P ablation of atrial flutter 6/20/14    CTI     Stented coronary artery      SVT (supraventricular tachycardia) (H)     PPM/AICD for NSVT     Upper leg DVT (deep venous thromboembolism), chronic (H)     Left     Weight loss, unintentional 2017    15 lb in 4 months     Past Surgical History:   Procedure Laterality Date     C CABG, ARTERY-VEIN, FOUR  2006    LIMA-LAD, SVG-Rt PDA, SVG-OM2, SVG-Diag 1     C CABG, VEIN, SINGLE  1994    1-vessel CABG, SVG->PDRCA      CATARACT IOL, RT/LT Bilateral      COLONOSCOPY  3/13/2014    Procedure: COMBINED COLONOSCOPY, SINGLE BIOPSY/POLYPECTOMY BY BIOPSY;;  Surgeon: Mary Gerber MD;  Location: [facility]     COLONOSCOPY N/A 6/22/2015    Procedure: COLONOSCOPY;  Surgeon: Marilin Newman MD;  Location: [facility]     COLONOSCOPY N/A 11/7/2018    Procedure: COMBINED COLONOSCOPY, SINGLE OR MULTIPLE BIOPSY/POLYPECTOMY BY BIOPSY;  Surgeon: Roberto Esteban MD;  Location: [facility]     H ABLATION ATRIAL FLUTTER       HEART CATH DRUG ELUTING STENT PLACEMENT  4/19/2012    JEREMY x 2 to LCx     IMPLANT IMPLANTABLE CARDIOVERTER DEFIBRILLATOR  5-    ppm/aicd     KNEE SURGERY      right and left knee surgeries     LAPAROSCOPIC ASSISTED COLECTOMY LEFT (DESCENDING)  4/8/2014    Procedure: LAPAROSCOPIC ASSISTED COLECTOMY LEFT (DESCENDING);  Hand assisted Laparoscopic Sigmoid Colectomy , Laparoscopic mobilization of spleenic flexure *Latex Allergy*Anesthesia General with Block;  Surgeon: Chanelle Guzmán MD;  Location: [facility]     REPAIR VALVE MITRAL  2006     30-mm Medtronic Julian ring      SELECTIVE LASER TRABECULOPLASTY (SLT) OD (RIGHT EYE)  4/10, 1/12,+1/9/13    RE     SELECTIVE LASER TRABECULOPLASTY (SLT) OS (LEFT EYE)  5/2012     SHOULDER SURGERY      right rotator cuff  "    Family History   Problem Relation Age of Onset     C.A.D. Father      Anesthesia Reaction No family hx of      Crohn's Disease No family hx of      Ulcerative Colitis No family hx of      Cancer - colorectal No family hx of      Macular Degeneration No family hx of      Cancer No family hx of         No known family hx of skin cancer     Melanoma No family hx of      Skin Cancer No family hx of      Social History     Socioeconomic History     Marital status:      Spouse name: None     Number of children: None     Years of education: None     Highest education level: None   Social Needs     Financial resource strain: None     Food insecurity - worry: None     Food insecurity - inability: None     Transportation needs - medical: None     Transportation needs - non-medical: None   Occupational History     None   Tobacco Use     Smoking status: Former Smoker     Types: Cigarettes, Cigars     Last attempt to quit: 1968     Years since quittin.0     Smokeless tobacco: Never Used   Substance and Sexual Activity     Alcohol use: Yes     Alcohol/week: 0.0 oz     Comment: 2-3 drinks twice weekly     Drug use: No     Sexual activity: None   Other Topics Concern     Parent/sibling w/ CABG, MI or angioplasty before 65F 55M? Not Asked   Social History Narrative     None     Complete review of symptoms negative except as noted above.    Exam:  /61 (BP Location: Right arm, Patient Position: Sitting)   Pulse 64   Ht 1.727 m (5' 7.99\")   Wt 65.8 kg (145 lb)   SpO2 100%   BMI 22.05 kg/m     145 lbs 0 oz  Physical Exam   The patient is alert, oriented with a clear sensorium.   Skin shows numerous seborrheic keratotic lesions no other rashes and good turgor.   Head is normocephalic and atraumatic.   Eyes show PERRLA with miosis preventing good view of optic fundi.   Ears show normal TMs bilaterally.   Mouth shows clear oral mucosa.   Neck shows no nodes, thyromegaly or bruits.   Back is non " tender.  Lungs are clear to percussion and auscultation.   Heart shows normal S1 and S2 without murmur or gallop.   Abdomen is soft and nontender without masses or organomegaly.   Extremities show no edema and no evidence of active synovitis.   Neurologic examination shows cranial nerves II-XII intact. Motor shows 5/5 strength. Reflexes are symmetric.     ASSESSMENT  1 the hepatic flexure colon cancer for surgery  2 chronic kidney disease stable  3 coronary artery disease status post angioplasty asymptomatic  4 history of atrial fibrillation  5 MGUS  6 hypertension controlled  7 hyperlipidemia  8 history of DVT  9 ischemic cardiomyopathy status post ICD    Plan  Patient is stable from a medical and a cardiovascular standpoint and does not need any additional imaging prior to his planned surgery.  Light of his urinary frequency we will check a urine also reassess his BMP and CBC.  He would be considered intermediate risk for this low risk procedure.    This note was completed using Dragon voice recognition software.  Although reviewed after completion, some word and grammatical errors may occur.    Roberto Sarmiento MD  General Internal Medicine  [facility]  [phone]          Roberto Sarmiento MD

## 2019-01-07 NOTE — PATIENT INSTRUCTIONS
[facility] Medication Refill Request Information:  * Please contact your pharmacy regarding ANY request for medication refills.  ** [facility] Prescription Fax = [phone]  * Please allow 3 business days for routine medication refills.  * Please allow 5 business days for controlled substance medication refills.     [facility] Test Result notification information:  *You will be notified with in 7-10 days of your appointment day regarding the results of your test.  If you are on MyChart you will be notified as soon as the provider has reviewed the results and signed off on them.    [facility]: [phone]     Stop aspirin 1 week prior to surgery  Stop coumadin 4 days prior to surgery  Take the metoprolol and doxazosin the morning of surgery with a sip water

## 2019-01-07 NOTE — NURSING NOTE
Chief Complaint   Patient presents with     Pre-Op Exam     surgery 2/01/2019, laparoscopic extended right hemicolectomy      Refill Request     general wellness 5 months      Kanika Hernandez at 10:01 AM on 1/7/2019.

## 2019-01-17 DIAGNOSIS — E78.5 HYPERLIPIDEMIA, UNSPECIFIED HYPERLIPIDEMIA TYPE: ICD-10-CM

## 2019-01-18 ENCOUNTER — OFFICE VISIT (OUTPATIENT)
Dept: SURGERY | Facility: CLINIC | Age: 84
End: 2019-01-18
Payer: COMMERCIAL

## 2019-01-18 ENCOUNTER — ANESTHESIA EVENT (OUTPATIENT)
Dept: SURGERY | Facility: CLINIC | Age: 84
DRG: 331 | End: 2019-01-18
Payer: COMMERCIAL

## 2019-01-18 ENCOUNTER — ANTICOAGULATION THERAPY VISIT (OUTPATIENT)
Dept: ANTICOAGULATION | Facility: CLINIC | Age: 84
End: 2019-01-18

## 2019-01-18 VITALS
WEIGHT: 141.3 LBS | OXYGEN SATURATION: 100 % | HEART RATE: 77 BPM | RESPIRATION RATE: 18 BRPM | TEMPERATURE: 97.5 F | HEIGHT: 66 IN | SYSTOLIC BLOOD PRESSURE: 122 MMHG | BODY MASS INDEX: 22.71 KG/M2 | DIASTOLIC BLOOD PRESSURE: 66 MMHG

## 2019-01-18 DIAGNOSIS — C18.9 MALIGNANT NEOPLASM OF COLON, UNSPECIFIED PART OF COLON (H): ICD-10-CM

## 2019-01-18 DIAGNOSIS — Z79.01 ENCOUNTER FOR MONITORING WARFARIN THERAPY: Primary | ICD-10-CM

## 2019-01-18 DIAGNOSIS — Z51.81 ENCOUNTER FOR MONITORING WARFARIN THERAPY: Primary | ICD-10-CM

## 2019-01-18 DIAGNOSIS — Z51.81 ENCOUNTER FOR MONITORING WARFARIN THERAPY: ICD-10-CM

## 2019-01-18 DIAGNOSIS — C18.9 MALIGNANT NEOPLASM OF COLON, UNSPECIFIED PART OF COLON (H): Primary | ICD-10-CM

## 2019-01-18 DIAGNOSIS — Z79.01 ENCOUNTER FOR MONITORING WARFARIN THERAPY: ICD-10-CM

## 2019-01-18 DIAGNOSIS — I48.91 ATRIAL FIBRILLATION (H): ICD-10-CM

## 2019-01-18 LAB — INR PPP: 2.89 (ref 0.86–1.14)

## 2019-01-18 RX ORDER — ACETAMINOPHEN 325 MG/1
975 TABLET ORAL ONCE
Status: CANCELLED | OUTPATIENT
Start: 2019-01-18 | End: 2019-01-18

## 2019-01-18 ASSESSMENT — MIFFLIN-ST. JEOR: SCORE: 1278.68

## 2019-01-18 ASSESSMENT — ENCOUNTER SYMPTOMS: DYSRHYTHMIAS: 1

## 2019-01-18 ASSESSMENT — LIFESTYLE VARIABLES: TOBACCO_USE: 1

## 2019-01-18 NOTE — ADDENDUM NOTE
Addendum  created 01/18/19 1443 by Ksenia Buck RN    Patient Education assessment filed, Patient Education documented on, Patient Education title added

## 2019-01-18 NOTE — PHARMACY - PREOPERATIVE ASSESSMENT CENTER
Anticoagulation Note - Preoperative Assessment Center (PAC) Pharmacist     Patient seen and interviewed during time of PAC Clinic appointment January 18, 2019.  The purpose of this note is to document the perioperative anticoagulation plan outlined by the providers caring for Noe Florence.     Current Regimen  Anticoagulation Regimen as of January 18, 2019: warfarin 5 mg daily at lunch  Indication: atrial fibrillation, history of DVT 5 yrs ago  Prescriber:  Dr. Baird (cardiologist) and PCP Dr. Sarmiento  Expected Duration of therapy: undetermined  Current medications that may interact with this include: aspirin, sertraline, MVI  INR Goal: 2-3  Recent Change in oral intake/nutrition: No    Perioperative plan  Noe Florence is scheduled for lap extended R hemicolectomy on 2/1/19 with Dr. Guzmán and the perioperative anticoagulation plan is to hold warfarin x5 days prior to surgery without bridging as patient has done with previous procedures.        Resumption of anticoagulation after procedure will be based on surgery team assessment of bleeding risks and complications.  This plan may require re-assessment and modification by his primary team in the perioperative setting depending on patients clinical situation.        Stewart Goldman RPH  January 18, 2019  1:49 PM

## 2019-01-18 NOTE — PATIENT INSTRUCTIONS
Preparing for Your Surgery      Name:  Noe Florence   MRN:  [id number]   :  1935   Today's Date:  2019     Arriving for surgery:  Surgery date:  19  Arrival time:  10:30AM  Please come to:       [facility] [address]  [address] [place]  [address]    -   parking is available in front of the hospital from 5:15 am to 8:00 pm    -  Stop at the Information Desk in the lobby    -   Inform the information person that you are here for surgery. An escort to 3c will be provided. If you would not like an escort, please proceed to 3C on the 3rd floor. [phone]     What can I eat or drink?  -  You may have solid food or milk products until 19.  Begin Clear liquid diet 19, continue until 19, 10:30AM.  Keep yourself well hydrated, drink 8-10 ounces of clear ensure the evening before surgery and the morning of surgery. Follow instructions from Colon Rectal surgery for bowel prep.     Which medicines can I take?  Stop Aspirin and Multivitamins one week prior to surgery.  Hold Warfarin(Coumadin) starting 5 days prior to surgery, last dose 19.  Hold Ibuprofen and Naproxen for 24 hours prior to surgery.   -  Do NOT take these medications in the morning, the day of surgery:    Vitamin D3   Vitamin B12    -  Please take these medications the day of surgery:    Doxazosin(Cardura)  Metoprolol(Lopressor)    How do I prepare myself?  -  Take two showers: one the night before surgery; and one the morning of surgery.         Use Scrubcare or Hibiclens to wash from neck down.  You may use your own shampoo and conditioner. No other hair products.   -  Do NOT use lotion, powder, deodorant, or antiperspirant the day of your surgery.  -  Do NOT wear jewelry.  -  Begin using Incentive Spirometer 1 week prior to surgery.  Use 4 times per day, up to 5 breaths each time.  Bring Incentive Spirometer to hospital.  - Do not bring your own medications to the  hospital, except for inhalers and eye   drops.  -  Bring your ID and insurance card.    Questions or Concerns:  -If you have questions or concerns regarding the day of surgery, please call [phone].     -For questions after surgery please call your surgeons office.           Enhanced Recovery After Surgery     This is a team effort, including you, to get you back on your feet, eating and drinking normally and out of the hospital as quickly as possible.  The goals are: 1) NO INFECTIONS and   2) RETURN TO NORMAL DIET    How can we achieve these goals?  1) STAY ACTIVE: Walk every day before your surgery; try to increase the amount every day.  Walk after surgery as much as you can-the nurses will help you.  Walking speeds healing and gets you home quicker, you heal better at home and have less risk of infection.     2) INCENTIVE SPIROMETER: Practice your incentive spirometer 4 times per day with 5 repetitions each time.  Using the incentive spirometer can strengthen your muscles between your ribs and help you have a strong cough after surgery.  A more effective cough can help prevent problems with your lungs.    3) STAY HYDRATED: Drink clear liquids up until 2 1/2 hours before your surgery. We would like you to purchase a drink such as Gatorade or Ensure Clear (not the milkshake type).  Drink this before bedtime and on the way into the hospital, drink between 8-10 ounces or until you feel hydrated.  Keeping well hydrated leads to your veins being plump, you wake up faster, and you are less likely to be nauseated. Start drinking water as soon as you can after surgery and advance to clear liquids and food as tolerated.  IV fluids contain salt, drinking fluids will minimize the amount of IV fluids you need and decrease the amount of salt you get.    The most common reason for the patient to be readmitted is dehydration. Staying hydrated after you go home from the hospital is very important.  Ensure or Ensure Clear are  good options to keep you hydrated.     4) PAIN MANAGEMENT: If we minimize the amount of opioids and narcotics, and use regional blocks (which numb the area where your surgery is) along with oral pain medications; you will have less side effects of nausea and constipation. Narcotics can slow down your bowels and cause you to stay in the hospital longer.     Our goal is to keep you comfortable; eating and drinking normally and back home safely.     AFTER YOUR SURGERY  Breathing exercises   Breathing exercises help you recover faster. Take deep breaths and let the air out slowly. This will:     Help you wake up after surgery.    Help prevent complications like pneumonia.  Preventing complications will help you go home sooner.   We may give you a breathing device (incentive spirometer) to encourage you to breathe deeply.   Nausea and vomiting   You may feel sick to your stomach after surgery; if so, let your nurse know.    Pain control:  After surgery, you may have pain. Our goal is to help you manage your pain. Pain medicine will help you feel comfortable enough to do activities that will help you heal.  These activities may include breathing exercises, walking and physical therapy.   To help your health care team treat your pain we will ask: 1) If you have pain  2) where it is located 3) describe your pain in your words  Methods of pain control include medications given by mouth, vein or by nerve block for some surgeries.  Sequential Compression Device (SCD) or Pneumo Boots:  You may need to wear SCD S on your legs or feet. These are wraps connected to a machine that pumps in air and releases it. The repeated pumping helps prevent blood clots from forming.     Using an Incentive Spirometer    An incentive spirometer is a device that helps you do deep breathing exercises. These exercises expand your lungs, aid in circulation, and help prevent pneumonia. Deep breathing exercises also help you breathe better and improve  the function of your lungs by:    Keeping your lungs clear    Strengthening your breathing muscles    Helping prevent respiratory complications or problems  The incentive spirometer gives you a way to take an active part in your care. A nurse or therapist will teach you breathing exercises. To do these exercises, you will breathe in through your mouth and not your nose. The incentive spirometer only works correctly if you breathe in through your mouth.    Steps to clear lungs  Step 1. Exhale normally. Then, inhale normally.    Relax and breathe out.  Step 2. Place your lips tightly around the mouthpiece.    Make sure the device is upright and not tilted.  Step 3. Inhale as much air as you can through the mouthpiece (don't breath through your nose).    Inhale slowly and deeply.    Hold your breath long enough to keep the balls or disk raised for at least 3 to 5 seconds, or as instructed by your healthcare provider.  Step 4. Repeat the exercise regularly.  Begin using the Incentive Spirometer one week prior to your surgery, 4 times per day-5 times each.

## 2019-01-18 NOTE — PROGRESS NOTES
Preoperative Assessment Center medication history for January 18, 2019 is complete.    See Epic admission navigator for prior to admission medications.   Operating room staff will still need to confirm medications and last dose information on day of surgery.     Medication history interview sources:  patient, patient's family     Changes made to PTA medication list (reason)  Added: none  Deleted: bacitracin - no longer taking.   Triamcinolone cream - not taking  flonase - not taking  Changed: B12 sig, vitamin D dose, loprox, ketoconazole,     Additional medication history information (including reliability of information, actions taken by pharmacist):    -- Did have prophylactic cephalexin after proceure in Dec 2018.   -- No recent (within 30 days) course of steroids  -- No recent (within 30 days) chronic daily medications stopped   -- Patient declines being on any other prescription or over-the-counter medications    Prior to Admission medications    Medication Sig Last Dose Taking? Auth Provider   aspirin 81 MG tablet Take 1 tablet by mouth daily. Taking Yes Reported, Patient   atorvastatin (LIPITOR) 40 MG tablet Take 1 tablet (40 mg) by mouth daily  Patient taking differently: Take 40 mg by mouth At Bedtime  Taking Yes Roberto Sarmiento MD   Cholecalciferol (VITAMIN D-3 PO) Take 1,000 Units by mouth 2 times daily  Taking Yes Reported, Patient   ciclopirox (LOPROX) 0.77 % cream Apply topically 2 times daily To feet and toenails.  Patient taking differently: Apply topically 2 times daily as needed To feet and toenails. Taking Yes Guzman Boudreaux DPM   cyanocobalamin (VITAMIN  B-12) 1000 MCG tablet Take 2 tablets (2,000 mcg) by mouth daily  Patient taking differently: Take 1,000 mcg by mouth 2 times daily  Taking Yes Roberto Sarmiento MD   doxazosin (CARDURA) 2 MG tablet Take 1/2 tab in the morning and 1/2 tab in the evening Taking Yes Roberto Sarmiento MD   ketoconazole (NIZORAL) 2 % cream  Apply topically daily On hold  Patient taking differently: Apply topically daily as needed On hold Taking Yes NIKHIL Wheeler MD   loratadine (CLARITIN) 10 MG tablet Take 10 mg by mouth daily as needed  Taking Yes Reported, Patient   metoprolol tartrate (LOPRESSOR) 25 MG tablet Take 1 tablet (25 mg) by mouth 2 times daily Taking Yes Roberto Sarmiento MD   mirtazapine (REMERON) 15 MG tablet Take 15 mg by mouth At Bedtime. Taking Yes Reported, Patient   Multiple Vitamins-Minerals (CENTRUM SILVER) per tablet Take 1 tablet by mouth daily. AM  Taking Yes Unknown, Entered By History   sertraline (ZOLOFT) 100 MG tablet Take 100 mg by mouth every evening. Taking Yes Reported, Patient   warfarin (COUMADIN) 5 MG tablet Take 1 tablet by mouth daily or as directed by the Coumadin clinic.  Patient taking differently: As of January 18, 2019 - take 5 mg by mouth daily at lunch time Taking Yes Deedee Baird MD   Blood Pressure Monitor KIT 1 Units daily  Patient not taking: Reported on 12/3/2018   Deedee Baird MD   magnesium citrate solution Take 296 mLs by mouth See Admin Instructions Refer to surgery handout.   Chanelle Guzmán MD   ondansetron (ZOFRAN) 4 MG tablet Take 1 tablet (4 mg) by mouth every 6 hours as needed for nausea While taking neomycin and flagyl.   Chanelle Guzmán MD   order for DME 20-30 mm Hg knee length compression stockings.  Measure and fit.  Style and color per patient preference.  Doff n Aracelis per patient need.   Frida Desai MD   polyethylene glycol (MIRALAX) packet Take 238 g by mouth See Admin Instructions One 8.3-ounce bottle (238 g).  Refer to surgery packet.   Chanelle Guzmán MD          Medication history completed by: Stewart Goldman, [facility]

## 2019-01-18 NOTE — ANESTHESIA PREPROCEDURE EVALUATION
Anesthesia Pre-Procedure Evaluation    Patient: Noe Florence   MRN:     [id number] Gender:   male   Age:    83 year old :      1935        Preoperative Diagnosis:     Laparoscopic Extended Right Hemicolectomy     Past Medical History:   Diagnosis Date     Advanced open-angle glaucoma      Antiplatelet or antithrombotic long-term use      Atrial fibrillation (H)      CKD (chronic kidney disease), stage III (H)      Colon cancer (H)     Stage II-B colon cancer     Coronary artery disease     s/p CABG x 2, JEREMY x 2     Diverticulitis      Hyperlipidemia      Hypertension      Ischemic cardiomyopathy      MGUS (monoclonal gammopathy of unknown significance)      Nonsenile cataract     BE     Pacemaker      Polymorphic ventricular tachycardia (H)      Polymyalgia rheumatica (H)      PVD (posterior vitreous detachment), both eyes      S/P ablation of atrial flutter 14    CTI     Stented coronary artery      SVT (supraventricular tachycardia) (H)     PPM/AICD for NSVT     Upper leg DVT (deep venous thromboembolism), chronic (H)     Left     Weight loss, unintentional 2017    15 lb in 4 months      Past Surgical History:   Procedure Laterality Date     C CABG, ARTERY-VEIN, FOUR      LIMA-LAD, SVG-Rt PDA, SVG-OM2, SVG-Diag 1     C CABG, VEIN, SINGLE      1-vessel CABG, SVG->PDRCA      CATARACT IOL, RT/LT Bilateral      COLONOSCOPY  3/13/2014    Procedure: COMBINED COLONOSCOPY, SINGLE BIOPSY/POLYPECTOMY BY BIOPSY;;  Surgeon: Mary Gerber MD;  Location:  GI     COLONOSCOPY N/A 2015    Procedure: COLONOSCOPY;  Surgeon: Marilin Newman MD;  Location:  GI     COLONOSCOPY N/A 2018    Procedure: COMBINED COLONOSCOPY, SINGLE OR MULTIPLE BIOPSY/POLYPECTOMY BY BIOPSY;  Surgeon: Roberto Esteban MD;  Location:  GI     EP ICD GENERATOR CHANGE DUAL N/A 2018    Procedure: EP ICD Generator Change Dual;  Surgeon: Deedee Baird MD;  Location:  HEART CARDIAC CATH  LAB     H ABLATION ATRIAL FLUTTER       HEART CATH DRUG ELUTING STENT PLACEMENT  4/19/2012    JEREMY x 2 to LCx     IMPLANT IMPLANTABLE CARDIOVERTER DEFIBRILLATOR  5-    ppm/aicd     KNEE SURGERY      right and left knee surgeries     LAPAROSCOPIC ASSISTED COLECTOMY LEFT (DESCENDING)  4/8/2014    Procedure: LAPAROSCOPIC ASSISTED COLECTOMY LEFT (DESCENDING);  Hand assisted Laparoscopic Sigmoid Colectomy , Laparoscopic mobilization of spleenic flexure *Latex Allergy*Anesthesia General with Block;  Surgeon: Chanelle Guzmán MD;  Location: [facility] OR     REPAIR VALVE MITRAL  2006     30-mm Medtronic Julian ring      SELECTIVE LASER TRABECULOPLASTY (SLT) OD (RIGHT EYE)  4/10, 1/12,+1/9/13    RE     SELECTIVE LASER TRABECULOPLASTY (SLT) OS (LEFT EYE)  5/2012     SHOULDER SURGERY      right rotator cuff          Anesthesia Evaluation     . Pt has had prior anesthetic. Type: General    No history of anesthetic complications          ROS/MED HX    ENT/Pulmonary:     (+)tobacco use (quit 1968), Past use , . .   (-) sleep apnea   Neurologic:  - neg neurologic ROS     Cardiovascular:     (+) Dyslipidemia, hypertension--CAD, -CABG-date: s/p CABG 1994 with redo 2006 - LIMA-LAD, SV-RtPDA, SVG-OM2, SVG-Diag1; s/p DESx2 2012, . Taking blood thinners Pt has received instructions: Instructions Given to patient: See recommendations below. . . :ICD  type;Dual chamber; replacement 12/2018 . dysrhythmias a-fib and a-flutter, valvular problems/murmurs (s/p mitral valve ring repair 10/2006) type: MR . Previous cardiac testing Echodate:2017results:Interpretation Summary  Global and regional left ventricular function is normal with an EF of 55-60%.  Mild to moderate right ventricular dilation is present.  A pacemaker lead is noted in the right ventricle.  Severe biatrial enlargement is present.  Mitral valve annuloplasty ring is present.  The mean gradient across the mitral valve is 4 mmHg.  The inferior vena cava was normal in  size with preserved respiratory  variability.  No pericardial effusion is present.  This study was compared with the study from 12/15/2014. There has been no  change.date: results:ECG reviewed date:12/11/18 results:Atrial-paced rhythm with prolonged AV conduction with Premature atrial complexes with Aberrant conduction  Left axis deviation  Right bundle branch block  Inferior infarct , age undetermined  T wave abnormality, consider lateral ischemia date: results:         Pacemaker: s/p ICD generator; dual lead; medtronic; 12/11/18;  Patient is s/p ICD generator change with chronic leads.  Normal ICD function.  No episodes/ arrhythmias recorded.  Intrinsic rhythm = SR with 1st Deg AV Block @ 64 bpm.  AP = 65.4%.   = 16.3%.   METS/Exercise Tolerance:  >4 METS   Hematologic:     (+) History of blood clots pt is anticoagulated, Anemia (chronic anemia, baseilne Hgb ~9), -      Musculoskeletal:   (+) arthritis, , , -       GI/Hepatic:     (+) bowel prep, Other GI/Hepatic Hx of diverticulitis       Renal/Genitourinary:     (+) chronic renal disease, type: CRI, Pt does not require dialysis, Pt has no history of transplant,       Endo:  - neg endo ROS    (-) Type I DM, Type II DM and obesity   Psychiatric:  - neg psychiatric ROS       Infectious Disease:         Malignancy:   (+) Malignancy History of GI  GI CA Active status post,         Other:    (+) C-spine cleared: N/A, no H/O Chronic Pain,no other significant disability                        PHYSICAL EXAM:   Mental Status/Neuro:    Airway: Facies: Feasible  Mallampati: I  Mouth/Opening: Full  TM distance: > 6 cm  Neck ROM: Full   Respiratory: Auscultation: CTAB     Resp. Rate: Normal     Resp. Effort: Normal      CV: Rhythm: Irregular  Heart: Normal Sounds   Comments:                    Lab Results   Component Value Date    WBC 5.4 01/07/2019    HGB 8.9 (L) 01/07/2019    HCT 28.6 (L) 01/07/2019     01/07/2019    CRP <2.9 01/15/2018    SED 43 (H) 01/15/2018     " 01/07/2019    POTASSIUM 4.9 01/07/2019    CHLORIDE 108 01/07/2019    CO2 27 01/07/2019    BUN 47 (H) 01/07/2019    CR 1.21 01/07/2019    GLC 97 01/07/2019    KEITH 8.2 (L) 01/07/2019    PHOS 2.1 (L) 01/25/2017    MAG 2.2 11/16/2016    ALBUMIN 3.3 (L) 12/19/2018    PROTTOTAL 7.4 12/19/2018    ALT 25 12/19/2018    AST 35 12/19/2018    ALKPHOS 84 12/19/2018    BILITOTAL 0.3 12/19/2018    LIPASE 106 02/04/2013    AMYLASE 124 (H) 04/15/2012    PTT 31 11/04/2017    INR 2.03 (H) 12/19/2018    FIBR 250 10/04/2006    TSH 0.67 03/27/2018       Preop Vitals  BP Readings from Last 3 Encounters:   01/07/19 119/61   12/11/18 118/66   12/03/18 122/74    Pulse Readings from Last 3 Encounters:   01/07/19 64   12/11/18 77   12/03/18 109      Resp Readings from Last 3 Encounters:   12/11/18 18   11/07/18 18   10/02/18 14    SpO2 Readings from Last 3 Encounters:   01/07/19 100%   12/11/18 100%   12/03/18 100%      Temp Readings from Last 1 Encounters:   12/11/18 97.7  F (36.5  C) (Oral)    Ht Readings from Last 1 Encounters:   01/07/19 1.727 m (5' 7.99\")      Wt Readings from Last 1 Encounters:   01/07/19 65.8 kg (145 lb)    Estimated body mass index is 22.05 kg/m  as calculated from the following:    Height as of 1/7/19: 1.727 m (5' 7.99\").    Weight as of 1/7/19: 65.8 kg (145 lb).     LDA:  Peripheral IV 12/11/18 Left Lower forearm (Active)   Number of days: 38       Left Groin Interventional Cardiac Procedure Access (Active)   Number of days: 1592       Right Groin Interventional Cardiac Procedure Access (Active)   Number of days: 1592       Closed/Suction Drain 1 Left Abdomen Bulb 19 Telugu (Active)   Number of days: 1746            Assessment:   ASA SCORE: 3       Documentation: H&P complete; Preop Testing complete; Consents complete   Proceeding: Proceed without further delay  Tobacco Use:  NO Active use of Tobacco/UNKNOWN Tobacco use status     Plan:   Anes. Type:  General   Pre-Induction: Midazolam IV; Acetaminophen PO "   Induction:  IV (Standard)   Airway: Oral ETT   Access/Monitoring: PIV; 2nd PIV; A-Line   Maintenance: Balanced   Emergence: Procedure Site   Logistics: Same Day Surgery     Postop Pain/Sedation Strategy:  Standard-Options: Opioids PRN     PONV Management:  Adult Risk Factors:, Non-Smoker, Postop Opioids  Prevention: Ondansetron; Dexamethasone                  PAC Discussion and Assessment    ASA Classification: 3  Case is suitable for: [place]  Anesthetic techniques and relevant risks discussed: GA  Invasive monitoring and risk discussed: Yes  Types:   Possibility and Risk of blood transfusion discussed:   NPO instructions given:   Additional anesthetic preparation and risks discussed:   Needs early admission to pre-op area: Yes  Other:     PAC Resident/NP Anesthesia Assessment:  Noe Florence is a complex 83 year old male with PMH significant for CAD s/p CABG and DESx2, atrial fibrillation on Coumadin and NSVT s/p dual chamber ICD, HTN, HLD, CKDIII not on dialysis, colon cancer who is scheduled for Laparoscopic Extended Right Hemicolectomy on 2/1/2019 by Dr. Guzmán in treatment of colon cancer.  PAC referral for risk assessment and optimization for anesthesia with comorbid conditions of :    Pre-operative considerations:  Cardiac: Complex with hx of CAD s/p CABG (1994, redo 2006) on Aspirin, MR s/p MV ring repair 2006, JEREMY x2 2012, hx of NSVT and ischemic cardiomyopathy s/p dual chamber ICD generator change 12/11/18. Most recent ECHO (2017) with EF of 55%, biatrial enlargement. Denies recent symptoms of chest pain/SOB, no further cardiac workup warranted at this time. Following with Cardiology in March.   Hx of artial fibrillation s/p ablation, currently anticoagulated with Coumadin. Previously, pt did not require bridging therapy surrounding prior procedures. Recommend holding Coumadin 5 days prior to procedure (stop 1/27/18). Continue Metoprolol, take on DOS  HTN, controlled at this time  Patient  currently with >4METS, goes for walks with his wife, limited by arthritis and joint pains.   RCRI: 6.6%  Recommend placement of arterial line for blood pressure monitoring in setting of above  Pulm: Denies significant pulmonary hx; quit smoking in 1968 WINIFRED risk: low risk with score of 2  Renal: Hx of CKD stage III, most recent Cr 1.21  Heme: chronic anemia, likely ACD, with baseline Hgb roughly 9. Currently asymptomatic  GI: Pt with reoccurrence of colonic mass at hepatic flexure. Following with Dr. Macdonald and is scheduled for procedure as indicated above.   Risk of PONV score = 1 .  If > 2, anti-emetic intervention recommended.  Airway: Feasible. Class I airway, no hx of difficult intubation.     VTE risk: 5, 1.8%    Day of surgery he will need early admission for pacemaker/ICD setting.  Holding boost supplementation on DOS in lieu of bowel prep    Patient's questions answered to satisfaction, no other concerns at this time.     Patient is optimized and an acceptable candidate for the proposed procedure.  No further diagnostic evaluation is needed.     Patient also evaluated by Dr. Weinberg. See recommendations below.     Omero Roberson  CA-1, Anesthesiology          Mid-Level Provider/Resident:   Date:   Time:     Attending Anesthesiologist Anesthesia Assessment:  83 year old for lap jesus manuel-colectomy for coloon cancer. As noted above, CAD with CABG and redo CABG, MV ring repair, ischemic cardiomyopathy. He has recovered reasonably well with EF of 55% at this point.     Patient/case discussed with [facility]/resident; agree with above assessment. No need to see patient. Patient is appropriate for the planned procedure without further work-up or medical management.      Reviewed and Signed by PAC Anesthesiologist  Anesthesiologist: baldomero  Date: 1/18/2019  Time:   Pass/Fail: Pass  Disposition:     PAC Pharmacist Assessment:        Pharmacist:   Date:   Time:        Omero Roberson MD

## 2019-01-18 NOTE — PROGRESS NOTES
ANTICOAGULATION FOLLOW-UP CLINIC VISIT    Patient Name:  Noe Florence  Date:  2019  Contact Type:  Telephone    SUBJECTIVE:     Patient Findings     Comments:   Pt is scheduled for a Rt Hemicolectomy on  per Epic note from Stewart Goldman RPH pt is to hold Warfarin X 5 Days ()           OBJECTIVE    INR   Date Value Ref Range Status   2019 2.89 (H) 0.86 - 1.14 Final       ASSESSMENT / PLAN  INR assessment THER    Recheck INR In: 2 WEEKS    INR Location Clinic      Anticoagulation Summary  As of 2019    INR goal:   2.0-3.0   TTR:   68.2 % (2.6 y)   INR used for dosin.89 (2019)   Warfarin maintenance plan:   5 mg (5 mg x 1) every day   Full warfarin instructions:   : Hold; : Hold; : Hold; : Hold; : Hold; Otherwise 5 mg every day   Weekly warfarin total:   35 mg   Plan last modified:   Nguyen Zuniga RN (2017)   Next INR check:   2019   Priority:   INR   Target end date:   Indefinite    Indications    Atrial fibrillation (H) [I48.91] [I48.91]  Long-term (current) use of anticoagulants [Z79.01] [Z79.01]             Anticoagulation Episode Summary     INR check location:       Preferred lab:       Send INR reminders to:   [facility]    Comments:   Patient contact number: [phone]   Can leave results with Rica (friend)      Anticoagulation Care Providers     Provider Role Specialty Phone number    Deedee Baird MD [facility] Cardiology [phone]            See the Encounter Report to view Anticoagulation Flowsheet and Dosing Calendar (Go to Encounters tab in chart review, and find the Anticoagulation Therapy Visit)    Left message for patient with results and dosing recommendations. Asked patient to call back to report any missed doses, falls, signs and symptoms of bleeding or clotting, any changes in health, medication, or diet. Asked patient to call back with any questions or concerns.     Nguyen Zuniga RN

## 2019-01-19 RX ORDER — ATORVASTATIN CALCIUM 40 MG/1
40 TABLET, FILM COATED ORAL DAILY
Qty: 90 TABLET | Refills: 0 | Status: SHIPPED | OUTPATIENT
Start: 2019-01-19 | End: 2019-04-09

## 2019-02-01 ENCOUNTER — HOSPITAL ENCOUNTER (INPATIENT)
Facility: CLINIC | Age: 84
LOS: 6 days | Discharge: HOME HEALTH | DRG: 331 | End: 2019-02-07
Attending: COLON & RECTAL SURGERY | Admitting: COLON & RECTAL SURGERY
Payer: COMMERCIAL

## 2019-02-01 ENCOUNTER — ANESTHESIA (OUTPATIENT)
Dept: SURGERY | Facility: CLINIC | Age: 84
DRG: 331 | End: 2019-02-01
Payer: COMMERCIAL

## 2019-02-01 DIAGNOSIS — C18.4 MALIGNANT NEOPLASM OF TRANSVERSE COLON (H): ICD-10-CM

## 2019-02-01 DIAGNOSIS — I48.20 CHRONIC ATRIAL FIBRILLATION (H): ICD-10-CM

## 2019-02-01 DIAGNOSIS — I48.0 PAROXYSMAL ATRIAL FIBRILLATION (H): ICD-10-CM

## 2019-02-01 DIAGNOSIS — C18.9 MALIGNANT NEOPLASM OF COLON, UNSPECIFIED PART OF COLON (H): Primary | ICD-10-CM

## 2019-02-01 LAB
ABO + RH BLD: NORMAL
ABO + RH BLD: NORMAL
BLD GP AB SCN SERPL QL: NORMAL
BLOOD BANK CMNT PATIENT-IMP: NORMAL
BLOOD BANK CMNT PATIENT-IMP: NORMAL
CREAT SERPL-MCNC: 1.69 MG/DL (ref 0.66–1.25)
GFR SERPL CREATININE-BSD FRML MDRD: 37 ML/MIN/{1.73_M2}
GLUCOSE BLDC GLUCOMTR-MCNC: 120 MG/DL (ref 70–99)
HGB BLD-MCNC: 8.6 G/DL (ref 13.3–17.7)
INR PPP: 1.32 (ref 0.86–1.14)
POTASSIUM SERPL-SCNC: 4.1 MMOL/L (ref 3.4–5.3)
SPECIMEN EXP DATE BLD: NORMAL

## 2019-02-01 PROCEDURE — C9290 INJ, BUPIVACAINE LIPOSOME: HCPCS | Performed by: STUDENT IN AN ORGANIZED HEALTH CARE EDUCATION/TRAINING PROGRAM

## 2019-02-01 PROCEDURE — 36415 COLL VENOUS BLD VENIPUNCTURE: CPT | Performed by: ANESTHESIOLOGY

## 2019-02-01 PROCEDURE — 25000128 H RX IP 250 OP 636: Performed by: NURSE ANESTHETIST, CERTIFIED REGISTERED

## 2019-02-01 PROCEDURE — 88309 TISSUE EXAM BY PATHOLOGIST: CPT | Performed by: COLON & RECTAL SURGERY

## 2019-02-01 PROCEDURE — 40000171 ZZH STATISTIC PRE-PROCEDURE ASSESSMENT III: Performed by: COLON & RECTAL SURGERY

## 2019-02-01 PROCEDURE — 25000125 ZZHC RX 250: Performed by: NURSE ANESTHETIST, CERTIFIED REGISTERED

## 2019-02-01 PROCEDURE — 0DTL0ZZ RESECTION OF TRANSVERSE COLON, OPEN APPROACH: ICD-10-PCS | Performed by: COLON & RECTAL SURGERY

## 2019-02-01 PROCEDURE — 37000009 ZZH ANESTHESIA TECHNICAL FEE, EACH ADDTL 15 MIN: Performed by: COLON & RECTAL SURGERY

## 2019-02-01 PROCEDURE — 40000014 ZZH STATISTIC ARTERIAL MONITORING DAILY

## 2019-02-01 PROCEDURE — 25000128 H RX IP 250 OP 636

## 2019-02-01 PROCEDURE — 40000275 ZZH STATISTIC RCP TIME EA 10 MIN

## 2019-02-01 PROCEDURE — 84132 ASSAY OF SERUM POTASSIUM: CPT | Performed by: ANESTHESIOLOGY

## 2019-02-01 PROCEDURE — 71000014 ZZH RECOVERY PHASE 1 LEVEL 2 FIRST HR: Performed by: COLON & RECTAL SURGERY

## 2019-02-01 PROCEDURE — 00000146 ZZHCL STATISTIC GLUCOSE BY METER IP

## 2019-02-01 PROCEDURE — 25000566 ZZH SEVOFLURANE, EA 15 MIN: Performed by: COLON & RECTAL SURGERY

## 2019-02-01 PROCEDURE — 85018 HEMOGLOBIN: CPT | Performed by: ANESTHESIOLOGY

## 2019-02-01 PROCEDURE — 25000128 H RX IP 250 OP 636: Performed by: COLON & RECTAL SURGERY

## 2019-02-01 PROCEDURE — 36000062 ZZH SURGERY LEVEL 4 1ST 30 MIN - UMMC: Performed by: COLON & RECTAL SURGERY

## 2019-02-01 PROCEDURE — 82565 ASSAY OF CREATININE: CPT | Performed by: ANESTHESIOLOGY

## 2019-02-01 PROCEDURE — 25000128 H RX IP 250 OP 636: Performed by: STUDENT IN AN ORGANIZED HEALTH CARE EDUCATION/TRAINING PROGRAM

## 2019-02-01 PROCEDURE — 37000008 ZZH ANESTHESIA TECHNICAL FEE, 1ST 30 MIN: Performed by: COLON & RECTAL SURGERY

## 2019-02-01 PROCEDURE — 40000065 ZZH STATISTIC EKG NON-CHARGEABLE

## 2019-02-01 PROCEDURE — 36000064 ZZH SURGERY LEVEL 4 EA 15 ADDTL MIN - UMMC: Performed by: COLON & RECTAL SURGERY

## 2019-02-01 PROCEDURE — 93010 ELECTROCARDIOGRAM REPORT: CPT | Performed by: INTERNAL MEDICINE

## 2019-02-01 PROCEDURE — 25000125 ZZHC RX 250

## 2019-02-01 PROCEDURE — 0DN80ZZ RELEASE SMALL INTESTINE, OPEN APPROACH: ICD-10-PCS | Performed by: COLON & RECTAL SURGERY

## 2019-02-01 PROCEDURE — 12000001 ZZH R&B MED SURG/OB UMMC

## 2019-02-01 PROCEDURE — 27210794 ZZH OR GENERAL SUPPLY STERILE: Performed by: COLON & RECTAL SURGERY

## 2019-02-01 PROCEDURE — 71000015 ZZH RECOVERY PHASE 1 LEVEL 2 EA ADDTL HR: Performed by: COLON & RECTAL SURGERY

## 2019-02-01 PROCEDURE — 25000128 H RX IP 250 OP 636: Performed by: ANESTHESIOLOGY

## 2019-02-01 PROCEDURE — C9113 INJ PANTOPRAZOLE SODIUM, VIA: HCPCS | Performed by: COLON & RECTAL SURGERY

## 2019-02-01 PROCEDURE — 25000132 ZZH RX MED GY IP 250 OP 250 PS 637

## 2019-02-01 PROCEDURE — 25000132 ZZH RX MED GY IP 250 OP 250 PS 637: Performed by: COLON & RECTAL SURGERY

## 2019-02-01 PROCEDURE — 85610 PROTHROMBIN TIME: CPT | Performed by: ANESTHESIOLOGY

## 2019-02-01 RX ORDER — BUPIVACAINE HYDROCHLORIDE 2.5 MG/ML
INJECTION, SOLUTION EPIDURAL; INFILTRATION; INTRACAUDAL PRN
Status: DISCONTINUED | OUTPATIENT
Start: 2019-02-01 | End: 2019-02-01

## 2019-02-01 RX ORDER — EPHEDRINE SULFATE 50 MG/ML
INJECTION, SOLUTION INTRAMUSCULAR; INTRAVENOUS; SUBCUTANEOUS PRN
Status: DISCONTINUED | OUTPATIENT
Start: 2019-02-01 | End: 2019-02-01

## 2019-02-01 RX ORDER — PROPOFOL 10 MG/ML
INJECTION, EMULSION INTRAVENOUS PRN
Status: DISCONTINUED | OUTPATIENT
Start: 2019-02-01 | End: 2019-02-01

## 2019-02-01 RX ORDER — ONDANSETRON 4 MG/1
4 TABLET, ORALLY DISINTEGRATING ORAL EVERY 30 MIN PRN
Status: DISCONTINUED | OUTPATIENT
Start: 2019-02-01 | End: 2019-02-01 | Stop reason: HOSPADM

## 2019-02-01 RX ORDER — DEXAMETHASONE SODIUM PHOSPHATE 4 MG/ML
INJECTION, SOLUTION INTRA-ARTICULAR; INTRALESIONAL; INTRAMUSCULAR; INTRAVENOUS; SOFT TISSUE PRN
Status: DISCONTINUED | OUTPATIENT
Start: 2019-02-01 | End: 2019-02-01

## 2019-02-01 RX ORDER — LIDOCAINE HYDROCHLORIDE 20 MG/ML
INJECTION, SOLUTION INFILTRATION; PERINEURAL PRN
Status: DISCONTINUED | OUTPATIENT
Start: 2019-02-01 | End: 2019-02-01

## 2019-02-01 RX ORDER — METOPROLOL TARTRATE 25 MG/1
25 TABLET, FILM COATED ORAL 2 TIMES DAILY
Status: DISCONTINUED | OUTPATIENT
Start: 2019-02-01 | End: 2019-02-07 | Stop reason: HOSPADM

## 2019-02-01 RX ORDER — FENTANYL CITRATE 50 UG/ML
INJECTION, SOLUTION INTRAMUSCULAR; INTRAVENOUS PRN
Status: DISCONTINUED | OUTPATIENT
Start: 2019-02-01 | End: 2019-02-01

## 2019-02-01 RX ORDER — SODIUM CHLORIDE, SODIUM LACTATE, POTASSIUM CHLORIDE, CALCIUM CHLORIDE 600; 310; 30; 20 MG/100ML; MG/100ML; MG/100ML; MG/100ML
INJECTION, SOLUTION INTRAVENOUS CONTINUOUS
Status: DISCONTINUED | OUTPATIENT
Start: 2019-02-01 | End: 2019-02-06

## 2019-02-01 RX ORDER — ACETAMINOPHEN 325 MG/1
975 TABLET ORAL EVERY 6 HOURS
Status: DISCONTINUED | OUTPATIENT
Start: 2019-02-01 | End: 2019-02-02

## 2019-02-01 RX ORDER — SODIUM CHLORIDE, SODIUM LACTATE, POTASSIUM CHLORIDE, CALCIUM CHLORIDE 600; 310; 30; 20 MG/100ML; MG/100ML; MG/100ML; MG/100ML
INJECTION, SOLUTION INTRAVENOUS CONTINUOUS PRN
Status: DISCONTINUED | OUTPATIENT
Start: 2019-02-01 | End: 2019-02-01

## 2019-02-01 RX ORDER — ATORVASTATIN CALCIUM 40 MG/1
40 TABLET, FILM COATED ORAL DAILY
Status: DISCONTINUED | OUTPATIENT
Start: 2019-02-02 | End: 2019-02-02

## 2019-02-01 RX ORDER — ERTAPENEM 1 G/1
1 INJECTION, POWDER, LYOPHILIZED, FOR SOLUTION INTRAMUSCULAR; INTRAVENOUS SEE ADMIN INSTRUCTIONS
Status: DISCONTINUED | OUTPATIENT
Start: 2019-02-01 | End: 2019-02-01 | Stop reason: HOSPADM

## 2019-02-01 RX ORDER — FLUMAZENIL 0.1 MG/ML
0.2 INJECTION, SOLUTION INTRAVENOUS
Status: DISCONTINUED | OUTPATIENT
Start: 2019-02-01 | End: 2019-02-01 | Stop reason: HOSPADM

## 2019-02-01 RX ORDER — HYDRALAZINE HYDROCHLORIDE 20 MG/ML
2.5-5 INJECTION INTRAMUSCULAR; INTRAVENOUS EVERY 10 MIN PRN
Status: DISCONTINUED | OUTPATIENT
Start: 2019-02-01 | End: 2019-02-01 | Stop reason: HOSPADM

## 2019-02-01 RX ORDER — NALOXONE HYDROCHLORIDE 0.4 MG/ML
.1-.4 INJECTION, SOLUTION INTRAMUSCULAR; INTRAVENOUS; SUBCUTANEOUS
Status: ACTIVE | OUTPATIENT
Start: 2019-02-01 | End: 2019-02-02

## 2019-02-01 RX ORDER — ACETAMINOPHEN 325 MG/1
975 TABLET ORAL ONCE
Status: DISCONTINUED | OUTPATIENT
Start: 2019-02-01 | End: 2019-02-01 | Stop reason: HOSPADM

## 2019-02-01 RX ORDER — ONDANSETRON 2 MG/ML
4 INJECTION INTRAMUSCULAR; INTRAVENOUS EVERY 30 MIN PRN
Status: DISCONTINUED | OUTPATIENT
Start: 2019-02-01 | End: 2019-02-01 | Stop reason: HOSPADM

## 2019-02-01 RX ORDER — SERTRALINE HYDROCHLORIDE 100 MG/1
100 TABLET, FILM COATED ORAL EVERY EVENING
Status: DISCONTINUED | OUTPATIENT
Start: 2019-02-02 | End: 2019-02-07 | Stop reason: HOSPADM

## 2019-02-01 RX ORDER — NALOXONE HYDROCHLORIDE 0.4 MG/ML
.1-.4 INJECTION, SOLUTION INTRAMUSCULAR; INTRAVENOUS; SUBCUTANEOUS
Status: DISCONTINUED | OUTPATIENT
Start: 2019-02-01 | End: 2019-02-07 | Stop reason: HOSPADM

## 2019-02-01 RX ORDER — LIDOCAINE 40 MG/G
CREAM TOPICAL
Status: DISCONTINUED | OUTPATIENT
Start: 2019-02-01 | End: 2019-02-07 | Stop reason: HOSPADM

## 2019-02-01 RX ORDER — ONDANSETRON 2 MG/ML
INJECTION INTRAMUSCULAR; INTRAVENOUS PRN
Status: DISCONTINUED | OUTPATIENT
Start: 2019-02-01 | End: 2019-02-01

## 2019-02-01 RX ORDER — ERTAPENEM 1 G/1
1 INJECTION, POWDER, LYOPHILIZED, FOR SOLUTION INTRAMUSCULAR; INTRAVENOUS
Status: COMPLETED | OUTPATIENT
Start: 2019-02-01 | End: 2019-02-01

## 2019-02-01 RX ORDER — FENTANYL CITRATE 50 UG/ML
25-50 INJECTION, SOLUTION INTRAMUSCULAR; INTRAVENOUS
Status: DISCONTINUED | OUTPATIENT
Start: 2019-02-01 | End: 2019-02-01 | Stop reason: HOSPADM

## 2019-02-01 RX ORDER — ONDANSETRON 2 MG/ML
4 INJECTION INTRAMUSCULAR; INTRAVENOUS EVERY 6 HOURS PRN
Status: DISCONTINUED | OUTPATIENT
Start: 2019-02-01 | End: 2019-02-07 | Stop reason: HOSPADM

## 2019-02-01 RX ORDER — DOXAZOSIN 2 MG/1
2 TABLET ORAL DAILY
Status: DISCONTINUED | OUTPATIENT
Start: 2019-02-02 | End: 2019-02-07 | Stop reason: HOSPADM

## 2019-02-01 RX ORDER — TRAMADOL HYDROCHLORIDE 50 MG/1
50 TABLET ORAL EVERY 6 HOURS PRN
Status: DISCONTINUED | OUTPATIENT
Start: 2019-02-01 | End: 2019-02-07 | Stop reason: HOSPADM

## 2019-02-01 RX ORDER — ACETAMINOPHEN 325 MG/1
975 TABLET ORAL ONCE
Status: COMPLETED | OUTPATIENT
Start: 2019-02-01 | End: 2019-02-01

## 2019-02-01 RX ORDER — SODIUM CHLORIDE, SODIUM LACTATE, POTASSIUM CHLORIDE, CALCIUM CHLORIDE 600; 310; 30; 20 MG/100ML; MG/100ML; MG/100ML; MG/100ML
INJECTION, SOLUTION INTRAVENOUS CONTINUOUS
Status: DISCONTINUED | OUTPATIENT
Start: 2019-02-01 | End: 2019-02-01 | Stop reason: HOSPADM

## 2019-02-01 RX ORDER — NALOXONE HYDROCHLORIDE 0.4 MG/ML
.1-.4 INJECTION, SOLUTION INTRAMUSCULAR; INTRAVENOUS; SUBCUTANEOUS
Status: DISCONTINUED | OUTPATIENT
Start: 2019-02-01 | End: 2019-02-01 | Stop reason: HOSPADM

## 2019-02-01 RX ORDER — GABAPENTIN 300 MG/1
300 CAPSULE ORAL ONCE
Status: COMPLETED | OUTPATIENT
Start: 2019-02-01 | End: 2019-02-01

## 2019-02-01 RX ORDER — KETOCONAZOLE 20 MG/G
CREAM TOPICAL DAILY PRN
Status: DISCONTINUED | OUTPATIENT
Start: 2019-02-01 | End: 2019-02-07 | Stop reason: HOSPADM

## 2019-02-01 RX ORDER — HYDROMORPHONE HCL/0.9% NACL/PF 0.2MG/0.2
0.2 SYRINGE (ML) INTRAVENOUS
Status: DISCONTINUED | OUTPATIENT
Start: 2019-02-01 | End: 2019-02-07 | Stop reason: HOSPADM

## 2019-02-01 RX ORDER — MIRTAZAPINE 15 MG/1
15 TABLET, FILM COATED ORAL AT BEDTIME
Status: DISCONTINUED | OUTPATIENT
Start: 2019-02-01 | End: 2019-02-07 | Stop reason: HOSPADM

## 2019-02-01 RX ORDER — HYDROMORPHONE HYDROCHLORIDE 1 MG/ML
.3-.5 INJECTION, SOLUTION INTRAMUSCULAR; INTRAVENOUS; SUBCUTANEOUS EVERY 5 MIN PRN
Status: DISCONTINUED | OUTPATIENT
Start: 2019-02-01 | End: 2019-02-01 | Stop reason: HOSPADM

## 2019-02-01 RX ORDER — LABETALOL HYDROCHLORIDE 5 MG/ML
10 INJECTION, SOLUTION INTRAVENOUS
Status: DISCONTINUED | OUTPATIENT
Start: 2019-02-01 | End: 2019-02-01 | Stop reason: HOSPADM

## 2019-02-01 RX ADMIN — FENTANYL CITRATE 25 MCG: 50 INJECTION, SOLUTION INTRAMUSCULAR; INTRAVENOUS at 17:42

## 2019-02-01 RX ADMIN — ROCURONIUM BROMIDE 20 MG: 10 INJECTION INTRAVENOUS at 15:04

## 2019-02-01 RX ADMIN — PHENYLEPHRINE HYDROCHLORIDE 200 MCG: 10 INJECTION, SOLUTION INTRAMUSCULAR; INTRAVENOUS; SUBCUTANEOUS at 15:13

## 2019-02-01 RX ADMIN — FENTANYL CITRATE 25 MCG: 50 INJECTION, SOLUTION INTRAMUSCULAR; INTRAVENOUS at 12:06

## 2019-02-01 RX ADMIN — PHENYLEPHRINE HYDROCHLORIDE 100 MCG: 10 INJECTION, SOLUTION INTRAMUSCULAR; INTRAVENOUS; SUBCUTANEOUS at 15:09

## 2019-02-01 RX ADMIN — VASOPRESSIN 0.5 UNITS: 20 INJECTION INTRAVENOUS at 15:38

## 2019-02-01 RX ADMIN — LIDOCAINE HYDROCHLORIDE 100 MG: 20 INJECTION, SOLUTION INFILTRATION; PERINEURAL at 14:10

## 2019-02-01 RX ADMIN — ACETAMINOPHEN 975 MG: 325 TABLET, FILM COATED ORAL at 11:56

## 2019-02-01 RX ADMIN — VASOPRESSIN 0.5 UNITS: 20 INJECTION INTRAVENOUS at 15:25

## 2019-02-01 RX ADMIN — ONDANSETRON 4 MG: 2 INJECTION INTRAMUSCULAR; INTRAVENOUS at 16:07

## 2019-02-01 RX ADMIN — PHENYLEPHRINE HYDROCHLORIDE 100 MCG: 10 INJECTION, SOLUTION INTRAMUSCULAR; INTRAVENOUS; SUBCUTANEOUS at 14:38

## 2019-02-01 RX ADMIN — MIRTAZAPINE 15 MG: 15 TABLET, FILM COATED ORAL at 22:24

## 2019-02-01 RX ADMIN — SODIUM CHLORIDE, POTASSIUM CHLORIDE, SODIUM LACTATE AND CALCIUM CHLORIDE: 600; 310; 30; 20 INJECTION, SOLUTION INTRAVENOUS at 19:15

## 2019-02-01 RX ADMIN — ACETAMINOPHEN 975 MG: 325 TABLET, FILM COATED ORAL at 19:14

## 2019-02-01 RX ADMIN — ERTAPENEM SODIUM 1 G: 1 INJECTION, POWDER, LYOPHILIZED, FOR SOLUTION INTRAMUSCULAR; INTRAVENOUS at 14:23

## 2019-02-01 RX ADMIN — BUPIVACAINE 20 ML: 13.3 INJECTION, SUSPENSION, LIPOSOMAL INFILTRATION at 12:30

## 2019-02-01 RX ADMIN — DEXAMETHASONE SODIUM PHOSPHATE 8 MG: 4 INJECTION, SOLUTION INTRA-ARTICULAR; INTRALESIONAL; INTRAMUSCULAR; INTRAVENOUS; SOFT TISSUE at 15:05

## 2019-02-01 RX ADMIN — PHENYLEPHRINE HYDROCHLORIDE 100 MCG: 10 INJECTION, SOLUTION INTRAMUSCULAR; INTRAVENOUS; SUBCUTANEOUS at 14:10

## 2019-02-01 RX ADMIN — METOPROLOL TARTRATE 25 MG: 25 TABLET ORAL at 19:14

## 2019-02-01 RX ADMIN — SODIUM CHLORIDE, POTASSIUM CHLORIDE, SODIUM LACTATE AND CALCIUM CHLORIDE: 600; 310; 30; 20 INJECTION, SOLUTION INTRAVENOUS at 13:54

## 2019-02-01 RX ADMIN — GABAPENTIN 300 MG: 300 CAPSULE ORAL at 11:56

## 2019-02-01 RX ADMIN — BUPIVACAINE HYDROCHLORIDE 20 ML: 2.5 INJECTION, SOLUTION EPIDURAL; INFILTRATION; INTRACAUDAL; PERINEURAL at 12:30

## 2019-02-01 RX ADMIN — FENTANYL CITRATE 100 MCG: 50 INJECTION, SOLUTION INTRAMUSCULAR; INTRAVENOUS at 14:10

## 2019-02-01 RX ADMIN — SUGAMMADEX 150 MG: 100 INJECTION, SOLUTION INTRAVENOUS at 16:07

## 2019-02-01 RX ADMIN — PHENYLEPHRINE HYDROCHLORIDE 100 MCG: 10 INJECTION, SOLUTION INTRAMUSCULAR; INTRAVENOUS; SUBCUTANEOUS at 15:22

## 2019-02-01 RX ADMIN — Medication 5 MG: at 15:22

## 2019-02-01 RX ADMIN — PROPOFOL 50 MG: 10 INJECTION, EMULSION INTRAVENOUS at 14:10

## 2019-02-01 RX ADMIN — PANTOPRAZOLE SODIUM 40 MG: 40 INJECTION, POWDER, FOR SOLUTION INTRAVENOUS at 19:14

## 2019-02-01 RX ADMIN — Medication 10 MG: at 14:31

## 2019-02-01 RX ADMIN — TRAMADOL HYDROCHLORIDE 50 MG: 50 TABLET, COATED ORAL at 20:41

## 2019-02-01 RX ADMIN — ROCURONIUM BROMIDE 100 MG: 10 INJECTION INTRAVENOUS at 14:10

## 2019-02-01 RX ADMIN — FENTANYL CITRATE 25 MCG: 50 INJECTION, SOLUTION INTRAMUSCULAR; INTRAVENOUS at 17:23

## 2019-02-01 RX ADMIN — PROPOFOL 20 MG: 10 INJECTION, EMULSION INTRAVENOUS at 15:41

## 2019-02-01 RX ADMIN — SODIUM CHLORIDE, POTASSIUM CHLORIDE, SODIUM LACTATE AND CALCIUM CHLORIDE: 600; 310; 30; 20 INJECTION, SOLUTION INTRAVENOUS at 14:18

## 2019-02-01 ASSESSMENT — ACTIVITIES OF DAILY LIVING (ADL): ADLS_ACUITY_SCORE: 18

## 2019-02-01 ASSESSMENT — MIFFLIN-ST. JEOR: SCORE: 1307.5

## 2019-02-01 NOTE — SIGNIFICANT EVENT
SPIRITUAL HEALTH SERVICES  [facility] 3C   PRE-SURGERY VISIT    Had pre-surgery visit with pt. and family.  Provided spiritual support, prayer.     I ANOINTED PT. ON 2/1/2019    Manuel Crawford M.Div.  Priest Oshea

## 2019-02-01 NOTE — LETTER
Transition Communication Hand-off for Care Transitions to Next Level of Care Provider    Name: Noe Florence  : 1935  MRN #: [id number]  Primary Care Provider: Roberto Sarmiento     Primary Clinic: [address] [place] [id number]     Reason for Hospitalization:  Colon Cancer  Colon cancer (H)  Admit Date/Time: 2019 10:29 AM  Discharge Date: To be determined.  Discharge Plan: Discharge to home with home care services when stable for discharge on or around 2109.Follow-up plan:    Future Appointments   Date Time Provider Department Center   3/5/2019  2:30 PM Jase Duarte MD [facility]   3/18/2019  1:30 PM [facility]   3/18/2019  2:00 PM Jaye Alexander APRN [facility]   2019 10:15 AM UC LAB [facility]   2019 11[facility]   2019 10:45 AM Francisco Gilliam MD [facility]   2019  3:00 PM Frida Desai MD [facility]   Any outstanding tests or procedures:        Referrals     Future Labs/Procedures    Home care nursing referral     Comments:    Please fax discharge orders to [facility]    Ph:  [phone]    Fax: [phone]    Skilled home care RN for initial home safety evaluation and 1-3 times a week to evaluate medication management, nutrition and hydration evaluation, endurance evaluation, and general status evaluation after discharge from the acute care hospital setting.    Skilled home care RN to assist with management and education reinforcement with home lovenox injections as prescribed.    Skilled home care Physical Therapist and Occupational Therapist to evaluate and treat in home setting per recommendations of the Inpatient Rehabilitation Consult Team.            Key Recommendations:      Rita Perez, B.S.N., P.H.N.,R.N.         Pager

## 2019-02-01 NOTE — ANESTHESIA CARE TRANSFER NOTE
Patient: Noe Florence    Procedure(s):  Laparoscopic Converted to Open Transverse Colectomy with Lysis of Adhesions    Diagnosis: Colon Cancer  Diagnosis Additional Information: No value filed.    Anesthesia Type:   No value filed.     Note:  Airway :Face Mask  Patient transferred to:PACU  Comments: Spont resp, VSS, report to RNHandoff Report: Identifed the Patient, Identified the Reponsible Provider, Reviewed the pertinent medical history, Discussed the surgical course, Reviewed Intra-OP anesthesia mangement and issues during anesthesia, Set expectations for post-procedure period and Allowed opportunity for questions and acknowledgement of understanding      Vitals: (Last set prior to Anesthesia Care Transfer)    CRNA VITALS  2/1/2019 1617 - 2/1/2019 1651      2/1/2019             Resp Rate (observed):  30    Resp Rate (set):  10                Electronically Signed By: MARIA LUISA William CRNA  February 1, 2019  4:51 PM

## 2019-02-01 NOTE — BRIEF OP NOTE
[facility], [place]    Brief Operative Note    Pre-operative diagnosis: Colon Cancer  Post-operative diagnosis Same  Procedure: Procedure(s):  Laparoscopic Converted to Open Transverse Colectomy with Lysis of Adhesions  Surgeon: Surgeon(s) and Role:     * Chanelle Guzmán MD - Primary     * Liliana Ramirez MD - Resident - Assisting     * Ayesha Fierro MD - Fellow - Assisting  Anesthesia: Combined General with Block   Estimated blood loss: 25 ml  Drains: None  Specimens:   ID Type Source Tests Collected by Time Destination   A : Transverse Colon Tissue Large Intestine, Transverse SURGICAL PATHOLOGY EXAM Chanelle Guzmán MD 2/1/2019  3:48 PM      Findings:   Significant adhesions from omentum to prior low midline incision as well as superiorly to stomach and liver. Due to significant adhesions and difficulty mobilizing transverse colon, converted from laparoscopic to open. Tattoo found on proximal transverse colon (not hepatic flexure or ascending colon). Therefore, segmental transverse colectomy performed with side to side functional end to end colocolonic anastomosis. Blood flow to new anastomosis confirmed with Doppler intraop.  Complications: None.  Implants: None.    Plan:  - PO pain meds (Tylenol, tramadol, dilaudid IV PRN)  - CLD  - Dominguez  - PTA metop, doxazosin, statin, Remeron, Zoloft  - Holding PTA ASA 81, warfarin  - AM labs  - Lovenox in AM if Hgb ok (ordered)    - - - - - - - - - - - - - - - - - -  Liliana Ramirez MD  General Surgery PGY-3  See [facility] for on call information prior to paging me directly. Pager [phone].

## 2019-02-01 NOTE — ANESTHESIA PROCEDURE NOTES
Arterial Line Procedure Note  Staff:     Anesthesiologist:  Matt Paul MD    Resident/CRNA:  Omero Roberson MD    Arterial line performed by resident/CRNA in presence of a teaching physician    Location: In OR After Induction  Procedure Start/Stop Times:     patient identified, IV checked, site marked, risks and benefits discussed, informed consent, monitors and equipment checked, pre-op evaluation and at physician/surgeon's request      Correct Patient: Yes      Correct Position: Yes      Correct Site: Yes      Correct Procedure: Yes      Correct Laterality:  Yes    Site Marked:  Yes  Line Placement:     Procedure:  Arterial Line    Insertion Site:  Radial    Insertion laterality:  Left    Skin Prep: Chloraprep      Patient Prep: patient draped, mask, sterile gloves, hat and hand hygiene      Local skin infiltration:  None    Ultrasound Guided?: No      Catheter size:  20 gauge, Quick cath    Cath secured with: suture      Dressing:  Tegaderm    Complications:  None obvious    Arterial waveform: Yes      IBP within 10% of NIBP: Yes

## 2019-02-01 NOTE — OR NURSING
Dr Michael Cummings paged for pre op orders. Device nurse Trena was paged for ICD pacer- she said that the pt has a heart rate 50-60 with 1'av block- they anesthesia team should place a magnet over the ICD during surgery.

## 2019-02-01 NOTE — ANESTHESIA PROCEDURE NOTES
Peripheral Nerve Block Procedure Note  Date/Time: 2/1/2019 12:30 PM    Staff:     Anesthesiologist:  Nick Mcneil MD    Resident/CRNA:  Tomasz Atwood MD    Block performed by resident/CRNA in the presence of a teaching physician    Location: Pre-op  Procedure Start/Stop TImes:      2/1/2019 12:15 PM     2/1/2019 12:33 PM    patient identified, IV checked, site marked, risks and benefits discussed, informed consent, monitors and equipment checked, pre-op evaluation, at physician/surgeon's request and post-op pain management      Correct Patient: Yes      Correct Position: Yes      Correct Site: Yes      Correct Procedure: Yes      Correct Laterality:  Yes    Site Marked:  Yes  Procedure details:     Procedure:  Paravertebral    ASA:  3    Diagnosis:  Colon cancer    Laterality:  Bilateral    Position:  Right Lateral Decubitus and Left Lateral Decubitus    Sterile Prep: chloraprep, mask and sterile gloves      Needle:  Short bevel and insulated    Needle gauge:  21    Needle length (inches):  4    Ultrasound: Yes      Ultrasound used to identify targeted nerve, plexus, or vascular structure and placed a needle adjacent to it      Permanent Image entered into patiient's record      Abnormal pain on injection: No      Blood Aspirated: No      Paresthesias:  No    Bleeding at site: No      Bolus via:  Needle    Infusion Method:  Single Shot    Complications:  None  Assessment/Narrative:     Injection made incrementally with aspirations every (mL):  5

## 2019-02-02 LAB
ANION GAP SERPL CALCULATED.3IONS-SCNC: 7 MMOL/L (ref 3–14)
BUN SERPL-MCNC: 30 MG/DL (ref 7–30)
CALCIUM SERPL-MCNC: 8.3 MG/DL (ref 8.5–10.1)
CHLORIDE SERPL-SCNC: 112 MMOL/L (ref 94–109)
CO2 SERPL-SCNC: 23 MMOL/L (ref 20–32)
CREAT SERPL-MCNC: 1.34 MG/DL (ref 0.66–1.25)
ERYTHROCYTE [DISTWIDTH] IN BLOOD BY AUTOMATED COUNT: 15.8 % (ref 10–15)
GFR SERPL CREATININE-BSD FRML MDRD: 48 ML/MIN/{1.73_M2}
GLUCOSE SERPL-MCNC: 83 MG/DL (ref 70–99)
HCT VFR BLD AUTO: 25.4 % (ref 40–53)
HGB BLD-MCNC: 7.9 G/DL (ref 13.3–17.7)
MAGNESIUM SERPL-MCNC: 2.4 MG/DL (ref 1.6–2.3)
MCH RBC QN AUTO: 28.6 PG (ref 26.5–33)
MCHC RBC AUTO-ENTMCNC: 31.1 G/DL (ref 31.5–36.5)
MCV RBC AUTO: 92 FL (ref 78–100)
PHOSPHATE SERPL-MCNC: 3.9 MG/DL (ref 2.5–4.5)
PLATELET # BLD AUTO: 174 10E9/L (ref 150–450)
POTASSIUM SERPL-SCNC: 4.8 MMOL/L (ref 3.4–5.3)
RBC # BLD AUTO: 2.76 10E12/L (ref 4.4–5.9)
SODIUM SERPL-SCNC: 141 MMOL/L (ref 133–144)
WBC # BLD AUTO: 7.9 10E9/L (ref 4–11)

## 2019-02-02 PROCEDURE — 25000132 ZZH RX MED GY IP 250 OP 250 PS 637: Performed by: COLON & RECTAL SURGERY

## 2019-02-02 PROCEDURE — 83735 ASSAY OF MAGNESIUM: CPT | Performed by: COLON & RECTAL SURGERY

## 2019-02-02 PROCEDURE — C9113 INJ PANTOPRAZOLE SODIUM, VIA: HCPCS | Performed by: COLON & RECTAL SURGERY

## 2019-02-02 PROCEDURE — 25000128 H RX IP 250 OP 636: Performed by: COLON & RECTAL SURGERY

## 2019-02-02 PROCEDURE — 85027 COMPLETE CBC AUTOMATED: CPT | Performed by: COLON & RECTAL SURGERY

## 2019-02-02 PROCEDURE — 80048 BASIC METABOLIC PNL TOTAL CA: CPT | Performed by: COLON & RECTAL SURGERY

## 2019-02-02 PROCEDURE — 25000128 H RX IP 250 OP 636: Performed by: STUDENT IN AN ORGANIZED HEALTH CARE EDUCATION/TRAINING PROGRAM

## 2019-02-02 PROCEDURE — 84100 ASSAY OF PHOSPHORUS: CPT | Performed by: COLON & RECTAL SURGERY

## 2019-02-02 PROCEDURE — 36415 COLL VENOUS BLD VENIPUNCTURE: CPT | Performed by: COLON & RECTAL SURGERY

## 2019-02-02 PROCEDURE — 12000012 ZZH R&B MS OVERFLOW UMMC

## 2019-02-02 RX ORDER — POTASSIUM CHLORIDE 29.8 MG/ML
20 INJECTION INTRAVENOUS
Status: DISCONTINUED | OUTPATIENT
Start: 2019-02-02 | End: 2019-02-07 | Stop reason: HOSPADM

## 2019-02-02 RX ORDER — POTASSIUM CHLORIDE 7.45 MG/ML
10 INJECTION INTRAVENOUS
Status: DISCONTINUED | OUTPATIENT
Start: 2019-02-02 | End: 2019-02-07 | Stop reason: HOSPADM

## 2019-02-02 RX ORDER — ACETAMINOPHEN 500 MG
1000 TABLET ORAL 4 TIMES DAILY
Status: DISCONTINUED | OUTPATIENT
Start: 2019-02-02 | End: 2019-02-07 | Stop reason: HOSPADM

## 2019-02-02 RX ORDER — POTASSIUM CL/LIDO/0.9 % NACL 10MEQ/0.1L
10 INTRAVENOUS SOLUTION, PIGGYBACK (ML) INTRAVENOUS
Status: DISCONTINUED | OUTPATIENT
Start: 2019-02-02 | End: 2019-02-07 | Stop reason: HOSPADM

## 2019-02-02 RX ORDER — MAGNESIUM SULFATE HEPTAHYDRATE 40 MG/ML
4 INJECTION, SOLUTION INTRAVENOUS EVERY 4 HOURS PRN
Status: DISCONTINUED | OUTPATIENT
Start: 2019-02-02 | End: 2019-02-07 | Stop reason: HOSPADM

## 2019-02-02 RX ADMIN — ACETAMINOPHEN 1000 MG: 500 TABLET, FILM COATED ORAL at 19:30

## 2019-02-02 RX ADMIN — DOXAZOSIN MESYLATE 2 MG: 2 TABLET ORAL at 09:33

## 2019-02-02 RX ADMIN — ACETAMINOPHEN 975 MG: 325 TABLET, FILM COATED ORAL at 09:32

## 2019-02-02 RX ADMIN — ACETAMINOPHEN 975 MG: 325 TABLET, FILM COATED ORAL at 02:19

## 2019-02-02 RX ADMIN — SODIUM CHLORIDE, POTASSIUM CHLORIDE, SODIUM LACTATE AND CALCIUM CHLORIDE: 600; 310; 30; 20 INJECTION, SOLUTION INTRAVENOUS at 06:24

## 2019-02-02 RX ADMIN — ATORVASTATIN CALCIUM 40 MG: 40 TABLET, FILM COATED ORAL at 09:32

## 2019-02-02 RX ADMIN — PANTOPRAZOLE SODIUM 40 MG: 40 INJECTION, POWDER, FOR SOLUTION INTRAVENOUS at 18:17

## 2019-02-02 RX ADMIN — Medication 0.2 MG: at 23:55

## 2019-02-02 RX ADMIN — ACETAMINOPHEN 1000 MG: 500 TABLET, FILM COATED ORAL at 12:42

## 2019-02-02 RX ADMIN — SERTRALINE HYDROCHLORIDE 100 MG: 100 TABLET ORAL at 19:30

## 2019-02-02 RX ADMIN — MIRTAZAPINE 15 MG: 15 TABLET, FILM COATED ORAL at 22:06

## 2019-02-02 RX ADMIN — CALCIUM GLUCONATE 2 G: 98 INJECTION, SOLUTION INTRAVENOUS at 12:38

## 2019-02-02 RX ADMIN — SODIUM CHLORIDE, POTASSIUM CHLORIDE, SODIUM LACTATE AND CALCIUM CHLORIDE: 600; 310; 30; 20 INJECTION, SOLUTION INTRAVENOUS at 23:20

## 2019-02-02 ASSESSMENT — ACTIVITIES OF DAILY LIVING (ADL)
ADLS_ACUITY_SCORE: 18
ADLS_ACUITY_SCORE: 16
ADLS_ACUITY_SCORE: 16
ADLS_ACUITY_SCORE: 18
ADLS_ACUITY_SCORE: 16
ADLS_ACUITY_SCORE: 16

## 2019-02-02 ASSESSMENT — PAIN DESCRIPTION - DESCRIPTORS: DESCRIPTORS: ACHING;SHARP

## 2019-02-02 NOTE — PROVIDER NOTIFICATION
Notified MD at 1120 PM regarding order clarification.      Spoke with: Dr Terri Addison MD  [phone]    Orders were obtained.    Comments: Notified MD of patient order:  Cardiac continuous monitoring PRN.  Asked MD if they wanted to monitor patient.  Per MD, pt has a significant cardiac history, so watch patient.  MD wrote order:  Telemetry monitoring.  Pt needs to be transferred to telemetry unit.  Per PPM, 6B has a bed.  Will give report ASAP.

## 2019-02-02 NOTE — PROGRESS NOTES
COLON & RECTAL SURGERY PROGRESS NOTE  02/02/2019     Subjective  Patient came to 7C post-op but had continuous cardiac telem ordered. With his significant cardiac hx overnight coverage transferred to 6B for cardiac telem. PAULOVN. Remains HDS. HR 50s-60s. /68. AM beta blocker held due to HR < 60 (parameters now ordered). He reports adequate pain control. No flatus or BM. Ambulated once yesterday, plans for more today. No complaints.    Objective  Temp:  [95.9  F (35.5  C)-97.6  F (36.4  C)] 97.5  F (36.4  C)  Pulse:  [55-60] 59  Heart Rate:  [56-60] 59  Resp:  [7-26] 12  BP: ()/(37-78) 108/78  MAP:  [62 mmHg-64 mmHg] 62 mmHg  Arterial Line BP: (103-106)/(43) 103/43  SpO2:  [94 %-100 %] 100 %    General: AAOx4, NAD, laying comfortably in bed  CV: HDS, warm, well perfused  Pulm: nonlabored breathing on room air  Abdomen: soft, non-distended, appropriately tender, no rebound or guarding; incisions c/d/i with dermabond  : Dominguez draining clear yellow urine  Extremities: no edema, moving all spontaneously and without apparent deficit    I/O last 3 completed shifts:  In: 1101.25 [P.O.:120; I.V.:981.25]  Out: 1175 [Urine:1150; Blood:25]    Labs:    Cr 1.34 (1.69)  Mg 2.4    WBC 7.9  Hgb 7.9 (8.6)  Plt 174    Assessment/Plan  82yo M with significant PMH: afib on warfarin, mitral valve repair (2006), MSVT s/p ICD, CAD s/p CAB (1994, 2006) and JEREMY (2012), h/o blood clots, CKD stage III, and 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 2/1 lap converted to open segmental transverse colectomy with Dr. Guzmán.    Neuro:   - b/l tap blocks, Tylenol 1 g QID scheduled, tramadol 50 mg q6h prn, dilaudid IV 0.2 mg q2h PRN  -PTA remeron, and zoloft    CV: HD stable, no acute issues, metop held due to low HR, continue PTA metop with hold parameters, PTA statin  -holding PTA ASA 81 and warfarin (afib)    Resp: wean O2 as able, encourage IS, albuterol prn    GI/FEN:    - CLD diet  - LR  @ 50 ml/hr  - replete lytes per protocol    : UOP adequate, mo remains (plan to remove 2/3), PTA finasteride and doxazosin    ID: no issues    Heme: Hgb 7.9 from 8.6, likely acute blood loss anemia from surgery, holding lovenox another day     Ppx: Will hold anticoagulation (plan for 30 mg Lovenox tomorrow if Hgb stable), Protonix IV daily, OOB, IS  Activity: ambulate QID  Dispo: floor    Disposition Plan   Expected discharge in 5-7 days to prior living arrangement once ROBF, pain controlled, ambulating at baseline.     Entered: Rafiq Dwyer 02/02/2019, 11:08 AM       Patient seen and examined with attending physician Dr. Alonso.  - - - - - - - - - - - - - - - - - -  Rafiq Dwyer MD  General Surgery, PGY-1  Pager [phone].

## 2019-02-02 NOTE — PROGRESS NOTES
Transfer  Transferred from:   Via:bed  Reason for transfer:Pt appropriate for 6B- continuous tele monitoring ordered   Family: Aware of transfer  Belongings: Received with pt  Chart: Received with pt  Medications: Meds received from old unit with pt  2 RN Skin Assessment Completed By: Olivia STEIN RN and Rylee MATTHEWS   Report received from: Madeline MATTHEWS  Pt status: VSS on OTIS pinto. Oriented to room and call light.

## 2019-02-02 NOTE — PLAN OF CARE
Arrived from PACU around 1830 from Laparoscopic Converted to Open Transverse Colectomy with Lysis of Adhesions.  VSS on 2L NC. ML incision with dermabond, 2 lap sites CDI. Pain managed with scheduled Tylenol.  IV dilaudid available, patient declined at this time. Dangled at bedside. PCDs on.  I.S. Instruction given, able to do 1250. Dominguez with marginal output, continue to monitor.  Tolerating clears. PIV on right with LR at 75 ml/hr. PIV on left lower forearm SL'd.

## 2019-02-02 NOTE — PROGRESS NOTES
Report given to  at 0220.    Pt doing well, AVSS, bradycardia at times HR 50-60s.  96% RA, Capno WNL.  Denies SOB.  Denies nausea, tolerating sips of clears.  Midline incision with dermabond, c/d/intact.  Lap sites c/d/i.  Dominguez with good UVols.  PIVx2, LR infusing @75.  Pt dangled last evening.    Pt to transfer to  for tele monitoring r/t significant heart history.  Cont to monitor pt closely.

## 2019-02-02 NOTE — PROGRESS NOTES
Postoperative Check Note  2/1/2019    S: Patient reports he is doing well postoperatively. Pain is well controlled with IV and oral pain medications. Ambulating with some pain. Dominguez in place. Denies nausea or vomiting, chest pain, shortness of breath, or other concerns at this time.    O:  Vitals:    02/01/19 2000 02/01/19 2030 02/01/19 2100 02/01/19 2200   BP: 112/52 105/51 110/51 118/58   BP Location: Right arm Right arm Right arm Right arm   Pulse: 58 58 59 59   Resp: (!) 7 9 11 11   Temp:    95.9  F (35.5  C)   TempSrc:    Oral   SpO2: 98% 99% 97% 96%   Weight:       Height:           Gen: no acute distress  Cardio: RRR,  Resp: no increased work of breathing  Abdomen: soft, appropriately tender, incisions clean dry and intact    A/P:  Patient doing well. No concerns. Continue with cares and plan per primary team.     Jennifer Ahuja, MS4    ----------------------------------------------------    I have seen and examined the patient with the medical student and have edited the note where appropriate. I agree with the assessment and plan as written above.    Terri Addison MD  General Surgery PGY-1

## 2019-02-02 NOTE — ANESTHESIA POSTPROCEDURE EVALUATION
Anesthesia POST Procedure Evaluation    Patient: Noe Florence   MRN:     [id number] Gender:   male   Age:    83 year old :      1935        Preoperative Diagnosis: Colon Cancer   Procedure(s):  Laparoscopic Converted to Open Transverse Colectomy with Lysis of Adhesions   Postop Comments: No value filed.       Anesthesia Type:  General    Reportable Event: NO     PAIN: Uncomplicated   Sign Out status: Comfortable, Well controlled pain     PONV: No PONV   Sign Out status:  No Nausea or Vomiting     Neuro/Psych: Uneventful perioperative course   Sign Out Status: Preoperative baseline; Age appropriate mentation     Airway/Resp.: Uneventful perioperative course   Sign Out Status: Non labored breathing, age appropriate RR; Resp. Status within EXPECTED Parameters     CV: Uneventful perioperative course   Sign Out status: Appropriate BP and perfusion indices; Appropriate HR/Rhythm     Disposition:   Sign Out in:  PACU  Disposition:  Phase II; Home  Recovery Course: Uneventful  Follow-Up: Not required           Last Anesthesia Record Vitals:  CRNA VITALS  2019 1617 - 2019 1717      2019             NIBP:  134/61    Pulse:  57    Temp:  36.3  C (97.3  F)    SpO2:  100 %    Resp Rate (observed):  16    EKG:  A Paced;Sinus rhythm;Comments (*see comments) intermittently paced, not pacemaker dependent     intermittently paced, not pacemaker dependent          Last PACU/Preop Vitals:  Vitals:    19 1745 19 1800 19 1815   BP: 94/54 98/53 93/53   Pulse:  56    Resp: 12 12 12   Temp:   36.4  C (97.5  F)   SpO2: 100% 100% 100%         Electronically Signed By: Yamil Hale MD, 2019, 6:28 PM

## 2019-02-02 NOTE — PLAN OF CARE
Arrived from  at approximately 0300. Minimal sleep overnight with transfer.   Neuro: A&Ox4.   Cardiac: Bradycardia in 50s, paced rhythm. VSS.      Respiratory: Sating >96% on RA.  GI/: Adequate urine output via patent mo.   Diet/appetite: Tolerating clear liquid diet.   Activity:  Repositioning independently in bed.  Pain: At acceptable level on current regimen. Denies pain at rest.    Skin: Incision and lap sites C/D/I with dermabond.   LDA's: R and L PIV, MIVF at 75 mL/hr. Mo.      Plan: Continue with POC. Notify primary team with changes.

## 2019-02-03 ENCOUNTER — APPOINTMENT (OUTPATIENT)
Dept: OCCUPATIONAL THERAPY | Facility: CLINIC | Age: 84
DRG: 331 | End: 2019-02-03
Attending: COLON & RECTAL SURGERY
Payer: COMMERCIAL

## 2019-02-03 LAB
ANION GAP SERPL CALCULATED.3IONS-SCNC: 7 MMOL/L (ref 3–14)
BUN SERPL-MCNC: 21 MG/DL (ref 7–30)
CALCIUM SERPL-MCNC: 8.3 MG/DL (ref 8.5–10.1)
CHLORIDE SERPL-SCNC: 113 MMOL/L (ref 94–109)
CO2 SERPL-SCNC: 22 MMOL/L (ref 20–32)
CREAT SERPL-MCNC: 1.27 MG/DL (ref 0.66–1.25)
GFR SERPL CREATININE-BSD FRML MDRD: 52 ML/MIN/{1.73_M2}
GLUCOSE SERPL-MCNC: 100 MG/DL (ref 70–99)
HGB BLD-MCNC: 8.1 G/DL (ref 13.3–17.7)
POTASSIUM SERPL-SCNC: 4 MMOL/L (ref 3.4–5.3)
SODIUM SERPL-SCNC: 142 MMOL/L (ref 133–144)

## 2019-02-03 PROCEDURE — 25000132 ZZH RX MED GY IP 250 OP 250 PS 637: Performed by: COLON & RECTAL SURGERY

## 2019-02-03 PROCEDURE — 40000133 ZZH STATISTIC OT WARD VISIT: Performed by: OCCUPATIONAL THERAPIST

## 2019-02-03 PROCEDURE — 97535 SELF CARE MNGMENT TRAINING: CPT | Mod: GO | Performed by: OCCUPATIONAL THERAPIST

## 2019-02-03 PROCEDURE — 80048 BASIC METABOLIC PNL TOTAL CA: CPT | Performed by: STUDENT IN AN ORGANIZED HEALTH CARE EDUCATION/TRAINING PROGRAM

## 2019-02-03 PROCEDURE — 25000128 H RX IP 250 OP 636: Performed by: COLON & RECTAL SURGERY

## 2019-02-03 PROCEDURE — C9113 INJ PANTOPRAZOLE SODIUM, VIA: HCPCS | Performed by: COLON & RECTAL SURGERY

## 2019-02-03 PROCEDURE — 85018 HEMOGLOBIN: CPT | Performed by: STUDENT IN AN ORGANIZED HEALTH CARE EDUCATION/TRAINING PROGRAM

## 2019-02-03 PROCEDURE — 36415 COLL VENOUS BLD VENIPUNCTURE: CPT | Performed by: STUDENT IN AN ORGANIZED HEALTH CARE EDUCATION/TRAINING PROGRAM

## 2019-02-03 PROCEDURE — 25000132 ZZH RX MED GY IP 250 OP 250 PS 637: Performed by: STUDENT IN AN ORGANIZED HEALTH CARE EDUCATION/TRAINING PROGRAM

## 2019-02-03 PROCEDURE — 12000012 ZZH R&B MS OVERFLOW UMMC

## 2019-02-03 RX ORDER — HEPARIN SODIUM 5000 [USP'U]/.5ML
5000 INJECTION, SOLUTION INTRAVENOUS; SUBCUTANEOUS EVERY 8 HOURS
Status: DISCONTINUED | OUTPATIENT
Start: 2019-02-03 | End: 2019-02-07 | Stop reason: HOSPADM

## 2019-02-03 RX ORDER — HEPARIN SODIUM 1000 [USP'U]/ML
5000 INJECTION, SOLUTION INTRAVENOUS; SUBCUTANEOUS EVERY 8 HOURS
Status: DISCONTINUED | OUTPATIENT
Start: 2019-02-03 | End: 2019-02-03

## 2019-02-03 RX ADMIN — Medication 5000 UNITS: at 21:30

## 2019-02-03 RX ADMIN — MIRTAZAPINE 15 MG: 15 TABLET, FILM COATED ORAL at 21:30

## 2019-02-03 RX ADMIN — ACETAMINOPHEN 1000 MG: 500 TABLET, FILM COATED ORAL at 08:38

## 2019-02-03 RX ADMIN — Medication 0.2 MG: at 04:12

## 2019-02-03 RX ADMIN — ACETAMINOPHEN 1000 MG: 500 TABLET, FILM COATED ORAL at 16:44

## 2019-02-03 RX ADMIN — SERTRALINE HYDROCHLORIDE 100 MG: 100 TABLET ORAL at 20:56

## 2019-02-03 RX ADMIN — METOPROLOL TARTRATE 25 MG: 25 TABLET ORAL at 08:38

## 2019-02-03 RX ADMIN — METOPROLOL TARTRATE 25 MG: 25 TABLET ORAL at 20:56

## 2019-02-03 RX ADMIN — ACETAMINOPHEN 1000 MG: 500 TABLET, FILM COATED ORAL at 12:52

## 2019-02-03 RX ADMIN — ACETAMINOPHEN 1000 MG: 500 TABLET, FILM COATED ORAL at 20:56

## 2019-02-03 RX ADMIN — PANTOPRAZOLE SODIUM 40 MG: 40 INJECTION, POWDER, FOR SOLUTION INTRAVENOUS at 19:40

## 2019-02-03 RX ADMIN — DOXAZOSIN MESYLATE 2 MG: 2 TABLET ORAL at 08:38

## 2019-02-03 RX ADMIN — Medication 0.2 MG: at 06:33

## 2019-02-03 ASSESSMENT — PAIN DESCRIPTION - DESCRIPTORS: DESCRIPTORS: CRAMPING;SHARP

## 2019-02-03 ASSESSMENT — ACTIVITIES OF DAILY LIVING (ADL)
PREVIOUS_RESPONSIBILITIES: MEAL PREP;HOUSEKEEPING;LAUNDRY;SHOPPING;MEDICATION MANAGEMENT;FINANCES
ADLS_ACUITY_SCORE: 16
ADLS_ACUITY_SCORE: 16
ADLS_ACUITY_SCORE: 13
ADLS_ACUITY_SCORE: 16
ADLS_ACUITY_SCORE: 16
ADLS_ACUITY_SCORE: 13

## 2019-02-03 ASSESSMENT — MIFFLIN-ST. JEOR: SCORE: 1284.5

## 2019-02-03 NOTE — PLAN OF CARE
Neuro: A&Ox4.   Cardiac: Bradycardia in 50s, mostly paced rhythm. VSS.      Respiratory: Sating >96% on RA, LS dim  GI/: Adequate urine output via patent mo, plan to discontinue tomorrow. BS hypoactive, denies flatus  Diet/appetite: Tolerating clear liquid diet.   Activity: Ambulated x 3 in halls with staff  Pain: At acceptable level on current regimen. Denies pain at rest.    Skin: Incision and lap sites C/D/I with dermabond.   LDA's: R and L PIV, MIVF at 50 mL/hr. Mo.      Plan: Continue with POC. Notify primary team with changes.

## 2019-02-03 NOTE — PLAN OF CARE
Neuro: A&Ox4.   Cardiac: afib frequently paced. VSS.              Respiratory: Sating 100 %on RA. Using IS independently and frequently  GI/: Adequate urine output. No BM No flatus  Diet/appetite: Tolerating clear liquid diet. Eating well.  Activity:  Assist of 1, up to chair and in halls.  Pain:  Pt started having abdominal cramps. While cramping, he states pain is severe at 7/10. In between cramps he states pain is tolerable. 0.2 mg IV dilaudid given x2 overnight with some relief. Warm blanket to abdomen.   Skin: abd lap and incision sites, left lower lip ecchymosis from ET tube  LDA's: mo, PIV x2

## 2019-02-03 NOTE — OP NOTE
SURGEON: Chanelle Guzmán MD     ASSISTANT: Ayesha Fierro MD, Colorectal Surgery fellow. Liliana Ramirez MD, Surgery Resident.    PREOPERATIVE DIAGNOSIS: Metachronous colon cancer.     POSTOPERATIVE DIAGNOSIS: Metachronous colon cancer.     PROCEDURES: Laparoscopic converted to open transverse colectomy, lysis of adhesions.    INDICATIONS:  Noe Florence is a 83 year old male with an extensive cardiac history who I previously treated for a sigmoid colon cancer with a sigmoid colectomy. He underwent a surveillance colonoscopy and was found to have a colon cancer in the proximal colon (estimated at the hepatic flexure). He was referred to me for an opinion on treatment options, and I am recommending surgical resection. He has some risk of adhesions because of previous surgery and also need to carefully look at the remaining colon blood flow because of previous resection. The risks and benefits of surgery were thoroughly discussed with the patient and he agreed to proceed.    DESCRIPTION OF PROCEDURE: The patient was brought to the operating room, placed supine on the operating table with a pink pad in place. General endotracheal anesthesia was induced. Dominguez catheter was placed. The patient was placed in modified lithotomy position with Yellofins and then carefully secured to the table. The abdomen was prepped and draped in the usual sterile fashion. We began the procedure with a timeout and placed a periumbilical port under direct visualization with a Aragon technique (12 mm). Under direct visualization, 5 mm ports were place in the left lower quadrant and left upper quadrant. There was an adhesion of the distal small bowel to the anterior abdominal wall in the pelvis along with adherent omentum. There were interloop adhesions of the small bowel and the dissection was quite challenging. There was no sign of metastatic disease. We detached the omentum from the anterior abdominal wall with the Ligature  device. The dissection was tedious and after some consideration, we opted to convert to open to expedite the procedure and successfully and safely take down the adhesions. We extended the perimbilical incision superiorly and gained access to the abdominal cavity and placed a medium wound protector. We lysed adhesions for about 20 minutes. The tattoo was in the mid transverse colon and not the hepatic flexure. The mass could be palpated. The mesentery was normal in caliber with no signs of lymphadenopathy. We performed a lateral dissection of the right side, opening the retroperitoneal plane and eventually pushing the duodenum away, and extending the dissection to the right upper quadrant and right lateral abdomen. The lateral dissection was performed on the ascending colon and hepatic flexure in right upper quadrant through the mid transverse colon, and then the ileal attachments were divided. The lesser sac was defined alone the transverse colon in a manner to keep the omentum with the colon specimen. Once fully mobilized, the middle colic was carefully defined and occluded and the proximal and distal colon dopplered. There was good collateral flow. We divided the middle colic vessels with a double tie and then divided the mesentery carefully. We again dopplered and confirmed blood supply. At this point, we performed out anastomosis, dividing the mesentery and then making two enterotomies. The anastomosis was formed using a single firing of the UGO blue load 100, which was hemostatic on inspection  followed by a single firing of the TA-90 blue load. The anastomosis was reinforced with a 3-0 Vicryl crotch stitch and sutures along the transverse staple line. After this, the anastomosis appeared highly satisfactory and was under no tension and healthy in appearance The midline fascia was closed using figure of 8 #1 PDS suture.  Subcutaneous tissue was irrigated out. The skin closed with 4-0 Monocryl subcuticular  suture followed by the application of Dermabond. The patient was emerged from general endotracheal anesthesia, taken postoperative anesthesia care unit in good condition. All instruments and sponge counts were correct x2 at the end the case.     SPECIMEN: transverse colon.     Chanelle Guzmán MD  Colon and Rectal Surgery Attending  Department of Surgery  [facility]

## 2019-02-03 NOTE — PROGRESS NOTES
"   02/03/19 1159   Quick Adds   Type of Visit Initial Occupational Therapy Evaluation   Living Environment   Lives With significant other  (Rica)   Living Arrangements house   Home Accessibility stairs to enter home;stairs within home   Number of Stairs, Main Entrance three   Number of Stairs, Within Home, Primary ten   Transportation Anticipated family or friend will provide   Living Environment Comment There is a 3/4 bath on the main level, bedrooms and full bath on upper level, laundry is in basement.   Self-Care   Usual Activity Tolerance good   Current Activity Tolerance moderate   Regular Exercise No   Activity/Exercise/Self-Care Comment Pt and his S.O. attend several events including opera, plays, theater.   Functional Level   Ambulation 0-->independent   Transferring 0-->independent   Toileting 0-->independent   Bathing 0-->independent   Dressing 0-->independent   Eating 0-->independent   Communication 0-->understands/communicates without difficulty   Swallowing 0-->swallows foods/liquids without difficulty   Cognition 0 - no cognition issues reported   General Information   Onset of Illness/Injury or Date of Surgery - Date 02/01/19   Referring Physician Rafiq Dwyer MD   Patient/Family Goals Statement \"go home\"   Additional Occupational Profile Info/Pertinent History of Current Problem 82yo M with significant PMH: afib on warfarin, mitral valve repair (2006), MSVT s/p ICD, CAD s/p CAB (1994, 2006) and JEREMY (2012), h/o blood clots, CKD stage III, and 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 2/1 lap converted to open segmental transverse colectomy    Precautions/Limitations abdominal precautions   Cognitive Status Examination   Orientation orientation to person, place and time   Level of Consciousness alert   Follows Commands (Cognition) WNL   Pain Assessment   Patient Currently in Pain No   Instrumental Activities of Daily Living (IADL)   Previous " Responsibilities meal prep;housekeeping;laundry;shopping;medication management;finances   IADL Comments Pt and his S.O. share tasks   Clinical Impression   Criteria for Skilled Therapeutic Interventions Met yes, treatment indicated   OT Diagnosis weakness and post surgical precautions affecting ADL IND   Influenced by the following impairments decreased strength and activity tolerance, post surgical precautions   Assessment of Occupational Performance 3-5 Performance Deficits   Identified Performance Deficits bathing, dressing, functional transfers, household tasks   Clinical Decision Making (Complexity) Low complexity   Therapy Frequency daily   Predicted Duration of Therapy Intervention (days/wks) 7 days   Anticipated Discharge Disposition Home with Assist   Risks and Benefits of Treatment have been explained. Yes   Patient, Family & other staff in agreement with plan of care Yes

## 2019-02-03 NOTE — PLAN OF CARE
PT 6B: Cx, pt declined PT as he reported he has been up a lot today including having take two walks and is  now too tired to participate in PT eval.

## 2019-02-03 NOTE — PLAN OF CARE
Neuro: A&Ox4.   Cardiac: ST . VSS.   Respiratory: Sating 100% on RA.  GI/: Adequate urine output. BM X1 Loose, incontinent  Diet/appetite: Tolerating renal diet. Eating well.  Activity:  Assist of 1, up to chair and in halls.  Pain: At acceptable level on current regimen.   Skin: No new deficits noted. Rash on LE.  LDA's: Right HD line, PIV x2    Plan: Continue with POC. Notify primary team with changes.    Heparin gtt running at 2100u/hr next 10a check at 0300.

## 2019-02-03 NOTE — PLAN OF CARE
Discharge Planner OT   Patient plan for discharge: home  Current status: Pt supine<>sit: MOD A with cues for technique/precautions, sit>stand:MOD A, ambulated ~150 ft with FWW and CGA.  Barriers to return to prior living situation: decreased strength and activity tolerance, post surgical abdominal precautions  Recommendations for discharge: home with assist for heavy lifting  Rationale for recommendations: Pt was previously IND with all ADL/IADLs and is expected to progress during hospital to stay to level safe for discharge to home.       Entered by: Cristal Wetzel 02/03/2019 1:24 PM

## 2019-02-03 NOTE — PLAN OF CARE
Neuro: A&Ox4.   Cardiac: afib frequently paced. VSS.   Respiratory: Sating 100 %on RA.  GI/: Adequate urine output. No BM No flatus  Diet/appetite: Tolerating clear liquid diet. Eating well.  Activity:  Assist of 1, up to chair and in halls.  Pain: At acceptable level on current regimen.   Skin: abd lap and incision sites, left lower lip ecchymosis   LDA's: chely, KASSANDRA x2    Plan: Continue with POC. Notify primary team with changes.

## 2019-02-04 ENCOUNTER — APPOINTMENT (OUTPATIENT)
Dept: PHYSICAL THERAPY | Facility: CLINIC | Age: 84
DRG: 331 | End: 2019-02-04
Attending: COLON & RECTAL SURGERY
Payer: COMMERCIAL

## 2019-02-04 ENCOUNTER — APPOINTMENT (OUTPATIENT)
Dept: OCCUPATIONAL THERAPY | Facility: CLINIC | Age: 84
DRG: 331 | End: 2019-02-04
Attending: COLON & RECTAL SURGERY
Payer: COMMERCIAL

## 2019-02-04 LAB
CREAT SERPL-MCNC: 1.23 MG/DL (ref 0.66–1.25)
GFR SERPL CREATININE-BSD FRML MDRD: 54 ML/MIN/{1.73_M2}
HGB BLD-MCNC: 7.9 G/DL (ref 13.3–17.7)
INTERPRETATION ECG - MUSE: NORMAL

## 2019-02-04 PROCEDURE — 97530 THERAPEUTIC ACTIVITIES: CPT | Mod: GP

## 2019-02-04 PROCEDURE — 97116 GAIT TRAINING THERAPY: CPT | Mod: GP

## 2019-02-04 PROCEDURE — 85018 HEMOGLOBIN: CPT | Performed by: COLON & RECTAL SURGERY

## 2019-02-04 PROCEDURE — 97161 PT EVAL LOW COMPLEX 20 MIN: CPT | Mod: GP

## 2019-02-04 PROCEDURE — 25000132 ZZH RX MED GY IP 250 OP 250 PS 637: Performed by: STUDENT IN AN ORGANIZED HEALTH CARE EDUCATION/TRAINING PROGRAM

## 2019-02-04 PROCEDURE — 82565 ASSAY OF CREATININE: CPT | Performed by: COLON & RECTAL SURGERY

## 2019-02-04 PROCEDURE — 25000132 ZZH RX MED GY IP 250 OP 250 PS 637: Performed by: COLON & RECTAL SURGERY

## 2019-02-04 PROCEDURE — 25000128 H RX IP 250 OP 636: Performed by: COLON & RECTAL SURGERY

## 2019-02-04 PROCEDURE — 36415 COLL VENOUS BLD VENIPUNCTURE: CPT | Performed by: COLON & RECTAL SURGERY

## 2019-02-04 PROCEDURE — 40000193 ZZH STATISTIC PT WARD VISIT

## 2019-02-04 PROCEDURE — 97535 SELF CARE MNGMENT TRAINING: CPT | Mod: GO

## 2019-02-04 PROCEDURE — 40000133 ZZH STATISTIC OT WARD VISIT

## 2019-02-04 PROCEDURE — 12000001 ZZH R&B MED SURG/OB UMMC

## 2019-02-04 PROCEDURE — C9113 INJ PANTOPRAZOLE SODIUM, VIA: HCPCS | Performed by: COLON & RECTAL SURGERY

## 2019-02-04 RX ADMIN — Medication 5000 UNITS: at 04:22

## 2019-02-04 RX ADMIN — DOXAZOSIN MESYLATE 2 MG: 2 TABLET ORAL at 09:40

## 2019-02-04 RX ADMIN — PANTOPRAZOLE SODIUM 40 MG: 40 INJECTION, POWDER, FOR SOLUTION INTRAVENOUS at 18:05

## 2019-02-04 RX ADMIN — Medication 5000 UNITS: at 13:57

## 2019-02-04 RX ADMIN — SERTRALINE HYDROCHLORIDE 100 MG: 100 TABLET ORAL at 19:11

## 2019-02-04 RX ADMIN — MIRTAZAPINE 15 MG: 15 TABLET, FILM COATED ORAL at 21:10

## 2019-02-04 RX ADMIN — ACETAMINOPHEN 1000 MG: 500 TABLET, FILM COATED ORAL at 09:40

## 2019-02-04 RX ADMIN — METOPROLOL TARTRATE 25 MG: 25 TABLET ORAL at 09:45

## 2019-02-04 RX ADMIN — ACETAMINOPHEN 1000 MG: 500 TABLET, FILM COATED ORAL at 18:05

## 2019-02-04 RX ADMIN — SODIUM CHLORIDE, POTASSIUM CHLORIDE, SODIUM LACTATE AND CALCIUM CHLORIDE: 600; 310; 30; 20 INJECTION, SOLUTION INTRAVENOUS at 18:05

## 2019-02-04 RX ADMIN — ACETAMINOPHEN 1000 MG: 500 TABLET, FILM COATED ORAL at 12:47

## 2019-02-04 RX ADMIN — METOPROLOL TARTRATE 25 MG: 25 TABLET ORAL at 19:11

## 2019-02-04 RX ADMIN — Medication 5000 UNITS: at 21:10

## 2019-02-04 ASSESSMENT — ACTIVITIES OF DAILY LIVING (ADL)
ADLS_ACUITY_SCORE: 13

## 2019-02-04 ASSESSMENT — MIFFLIN-ST. JEOR: SCORE: 1300.5

## 2019-02-04 ASSESSMENT — PAIN DESCRIPTION - DESCRIPTORS: DESCRIPTORS: SORE

## 2019-02-04 NOTE — PLAN OF CARE
Discharge Planner OT   Patient plan for discharge: home  Current status: Pt able to recall abdominal precautions. Pt mod I for bed mobility with HOB raised and SBA for functional transfers. Pt ambulated in room with CGA and FWW. Pt tolerated g/h tasks standing at sink ~25 min. Pt on RA, O2 sats >93% HR 58-63.   Barriers to return to prior living situation: medical status, stairs  Recommendations for discharge: home with assist for heavy lifting   Rationale for recommendations: Pt was previously IND with all ADL/IADLs and is expected to progress during hospital to stay to level safe for discharge to home.       Entered by: Julissa Torrez 02/04/2019 3:42 PM

## 2019-02-04 NOTE — PLAN OF CARE
Pt A & O x4, VSS. RA. Pain controlled with tylenol. Up with 1 assist, gait belt, and a walker. Worked with Therapy in the rehab gym, sat up in the chair for 1 hour.Tolerating diet with good appetite. Voiding adequate amounts. No bm. Abdomen incision with binder on.

## 2019-02-04 NOTE — PROGRESS NOTES
02/04/19 0900   Quick Adds   Type of Visit Initial PT Evaluation   Living Environment   Lives With significant other   Living Arrangements house   Home Accessibility stairs to enter home;stairs within home   Number of Stairs, Main Entrance three   Stair Railings, Main Entrance none   Number of Stairs, Within Home, Primary ten   Stair Railings, Within Home, Primary railing on left side (ascending)   Living Environment Comment Pt lives in two story home with S.O. who works, but could be home if needed. 3 stairs to enter, no rails. 10 stairs to basement, only laundry in basement.   Self-Care   Usual Activity Tolerance good   Current Activity Tolerance moderate   Regular Exercise Yes   Activity/Exercise/Self-Care Comment Pt played tennis for 20 years, but has not been able to participate in past two years. Pt and S.O. active in community, enjoys attending plays and operas at theaters; also enjoys books and magazines. Pt retired book .    Functional Level Prior   Ambulation 0-->independent   Transferring 0-->independent   Toileting 0-->independent   Bathing 0-->independent   Communication 0-->understands/communicates without difficulty   Swallowing 0-->swallows foods/liquids without difficulty   Cognition 0 - no cognition issues reported   Fall history within last six months yes   Number of times patient has fallen within last six months 1   Which of the above functional risks had a recent onset or change? ambulation;fall history   Prior Functional Level Comment Pt was IND with all functional mobility and ADL's. Owns a SPC from previous foot injury, was not using device prior to hospitalization.   General Information   Onset of Illness/Injury or Date of Surgery - Date 02/01/19   Referring Physician Rafiq Dwyer MD   Patient/Family Goals Statement to get back to moving around independently   Pertinent History of Current Problem (include personal factors and/or comorbidities that impact the POC) Pt is 83 y.o.  male s/p lap converted to open segmental transverse colectomy. 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   Precautions/Limitations fall precautions;abdominal precautions   Heart Disease Risk Factors Gender;Age;High blood pressure;Medical history   Cognitive Status Examination   Orientation orientation to person, place and time   Level of Consciousness alert   Follows Commands and Answers Questions 100% of the time   Personal Safety and Judgment intact   Pain Assessment   Patient Currently in Pain Yes, see Vital Sign flowsheet  (abdominal pain at incision, bloating discomfort)   Integumentary/Edema   Integumentary/Edema Comments dry, cracked toe nails, 2-3 hammer toes B   Posture    Posture Forward head position;Kyphosis;Protracted shoulders   Range of Motion (ROM)   ROM Comment Limited B knee ext and ankle DF (L more limited than R). B UE ROM WFL   Strength   Strength Comments B LE and UE at least 3/5 grossly   Bed Mobility   Bed Mobility Comments Min A for supine > sit, via rolling   Transfer Skills   Transfer Comments CGA for sit <> stand   Gait   Gait Comments Amb with FWW and CGA   Balance   Balance Comments sitting balance good. No LOB with amb including safe turns with FWW   General Therapy Interventions   Planned Therapy Interventions neuromuscular re-education;ROM;strengthening;stretching;balance training;bed mobility training;gait training;risk factor education;home program guidelines;progressive activity/exercise;transfer training   Clinical Impression   Criteria for Skilled Therapeutic Intervention yes, treatment indicated   PT Diagnosis impaired functional mobility   Influenced by the following impairments weakness, balance, activity tolerance, functional endurance   Functional limitations due to impairments stairs, amb, bed mobility, transfers   Clinical  "Presentation Stable/Uncomplicated   Clinical Presentation Rationale s/p open transverse colectomy, clinical reasoning   Clinical Decision Making (Complexity) Low complexity   Therapy Frequency` 5 times/week   Predicted Duration of Therapy Intervention (days/wks) 1 week   Anticipated Equipment Needs at Discharge front wheeled walker   Anticipated Discharge Disposition Home with Home Therapy   Risk & Benefits of therapy have been explained Yes   Patient, Family & other staff in agreement with plan of care Yes   [facility] AM-PAC  \"6 Clicks\" V.2 Basic Mobility Inpatient Short Form   1. Turning from your back to your side while in a flat bed without using bedrails? 3 - A Little   2. Moving from lying on your back to sitting on the side of a flat bed without using bedrails? 2 - A Lot   3. Moving to and from a bed to a chair (including a wheelchair)? 3 - A Little   4. Standing up from a chair using your arms (e.g., wheelchair, or bedside chair)? 3 - A Little   5. To walk in hospital room? 3 - A Little   6. Climbing 3-5 steps with a railing? 3 - A Little   Basic Mobility Raw Score (Score out of 24.Lower scores equate to lower levels of function) 17   Total Evaluation Time   Total Evaluation Time (Minutes) 10     "

## 2019-02-04 NOTE — PLAN OF CARE
"/63   Pulse 60   Temp 98  F (36.7  C) (Oral)   Resp 18   Ht 1.727 m (5' 8\")   Wt 63.1 kg (139 lb 1.8 oz)   SpO2 98%   BMI 21.15 kg/m      Pt is transferring to  soon. Reports given to floor nurse. Nurse walked pt in halls around 4pm with a walker and gait belt. Pt tolerated activity well. Significant other is at his bedside.   "

## 2019-02-04 NOTE — PLAN OF CARE
6B  Initial PT eval completed, treatment initiated  Discharge Planner PT   Patient plan for discharge: home, open to rehab  Current status: Supine > sit with Min A via roll to sidelying. Sit <> stand from multiple surfaces with CGA, required verbal cues for abdominal precautions. Pt amb 80' x2 with FWW, CGA. Ascend/descended 3 stairs x2 with R rail, CGA-Jose Manuel.  Barriers to return to prior living situation: strength, balance, activity tolerance, pain  Recommendations for discharge: TCU  Rationale for recommendations: Pt was previously IND with all mobility and ADL's. Presents now with impaired functional mobility due to strength and endurance deficits; would benefit from continued PT to improve independence and safety with home and community mobility.       Entered by: Pema Briones 02/04/2019 10:47 AM

## 2019-02-04 NOTE — PLAN OF CARE
Neuro: A&Ox4.   Cardiac: Paced rhythm. HR 50s-60s. VSS.   Respiratory: Sating >96% on RA.  GI/: Adequate urine output. PVD for 91 at 0400. +Flatus, no BM.   Diet/appetite: Tolerating Full liquid diet. Eating well.  Activity:  Assist of 1, up to chair and in halls.  Pain: At acceptable level on current regimen.   Skin: Abd incision and lap sites with dermabond, C/D/I.   LDA's: L PIV SL.      Plan: Goal for BM today and continue amb in [place]. Continue with POC. Notify primary team with changes.

## 2019-02-04 NOTE — PROGRESS NOTES
COLON & RECTAL SURGERY PROGRESS NOTE  02/03/2019     Subjective:  LALA. Doing well this morning. He had some pain characterized as abdominal cramping but this improved with re-positioning. No complaints.    Objective  Temp:  [97.7  F (36.5  C)-99  F (37.2  C)] 98.1  F (36.7  C)  Pulse:  [62-67] 67  Heart Rate:  [59-72] 66  Resp:  [16-20] 18  BP: (103-139)/(57-71) 118/67  SpO2:  [95 %-98 %] 97 %    General: AAOx4, NAD, laying comfortably in bed  CV: HDS, warm, well perfused  Pulm: nonlabored breathing on room air  Abdomen: soft, non-distended, appropriately tender, no rebound or guarding; incisions c/d/i with dermabond  : Mo draining clear yellow urine  Extremities: no edema, moving all spontaneously and without apparent deficit    I/O last 3 completed shifts:  In: 2575.42 [P.O.:960; I.V.:1615.42]  Out: 2750 [Urine:2750]    Labs:    Hgb 8.1  Cr 1.27 (1.34)    Assessment/Plan  84yo M with significant PMH: afib on warfarin, mitral valve repair (2006), MSVT s/p ICD, CAD s/p CAB (1994, 2006) and JEREMY (2012), h/o blood clots, CKD stage III, and 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 2/1 lap converted to open segmental transverse colectomy with Dr. Guzmán.    Neuro:   - b/l tap blocks, Tylenol 1 g QID scheduled, tramadol 50 mg q6h prn, dilaudid IV 0.2 mg q2h PRN  -PTA remerom and zoloft    CV: HD stable, no acute issues, continue PTA metop with hold parameters, PTA statin  -holding PTA ASA 81 and warfarin (afib)    Resp: wean O2 as able, encourage IS, albuterol prn    GI/FEN:    - FLD diet  - LR @ 50 ml/hr  - replete lytes per protocol    : UOP adequate, mo remains (plan to remove 2/3), PTA finasteride and doxazosin    ID: no issues    Heme: Hgb 8.1 from 7.9     Ppx: SQH BID, Protonix IV daily, OOB, IS  Activity: ambulate QID, PT/OT ordered  Dispo: floor - transfer to     Disposition Plan   Expected discharge in 3-4 days to prior living arrangement once ROBF, pain  controlled, ambulating at baseline.     Entered: Rafiq Dwyer 02/03/2019, 9:21 PM       Patient seen and examined with CRS fellow, Dr. Narayanan, who will discuss with staff.  - - - - - - - - - - - - - - - - - -  Rafiq Dwyer MD  General Surgery, PGY-1  Pager [phone].

## 2019-02-04 NOTE — PLAN OF CARE
Neuro: A&Ox4.   Cardiac: Bradycardia in 50-60ss, mostly paced rhythm. VSS.      Respiratory: Sating >96% on RA, LS dim  GI/: Dc'd mo, urinated x 1 afterwards, end of shift bladder scan 148 mL.  BS hypoactive, denies flatus  Diet/appetite: Tolerating full liquid diet.   Activity: Ambulated x 2 in halls with staff  Pain: At acceptable level on current regimen. Denies pain at rest.    Skin: Incision and lap sites C/D/I with dermabond.   LDA's: R and L PIV irena.      Plan: Continue with POC. Notify primary team with changes

## 2019-02-04 NOTE — PROGRESS NOTES
COLON & RECTAL SURGERY PROGRESS NOTE  02/04/2019   POD#3    Subjective:  NAEON. Voiding well after mo removed. Feeling well without complaints. Tolerating FLD without n/v. No flatus or BM yet. Pain controlled. Ambulating frequently.      Objective  Temp:  [97.7  F (36.5  C)-99  F (37.2  C)] 98.1  F (36.7  C)  Heart Rate:  [59-72] 59  Resp:  [16-20] 18  BP: (103-139)/(59-71) 139/70  SpO2:  [95 %-98 %] 95 %    General: AAOx4, NAD, laying comfortably in bed  CV: warm, well perfused  Pulm: nonlabored breathing on room air  Abdomen: soft, moderately distended especially in upper abdomen, appropriately tender, no rebound or guarding; incisions c/d/i with Dermabond  Extremities: no edema, moving all spontaneously and without apparent deficit    I/O last 3 completed shifts:  In: 2160 [P.O.:1560; I.V.:600]  Out: 2100 [Urine:2100]    Labs:  Recent Labs   Lab 02/04/19 0518 02/03/19  0517 02/02/19  0808   WBC  --   --  7.9   HGB 7.9* 8.1* 7.9*   PLT  --   --  174     Recent Labs   Lab 02/04/19 0518 02/03/19  0517 02/02/19  0808 02/01/19  1108   NA  --  142 141  --    POTASSIUM  --  4.0 4.8 4.1   CHLORIDE  --  113* 112*  --    CO2  --  22 23  --    BUN  --  21 30  --    CR 1.23 1.27* 1.34* 1.69*   GLC  --  100* 83  --    KEITH  --  8.3* 8.3*  --    MAG  --   --  2.4*  --    PHOS  --   --  3.9  --        Assessment/Plan  82yo M with significant PMH: afib on warfarin, mitral valve repair (2006), NSVT s/p ICD, CAD s/p CAB (1994, 2006) and JEREMY (2012), reported h/o blood clots, CKD stage III (b/l Cr ~1.1-1.2), and 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 Now s/p lap converted to open segmental transverse colectomy on 2/1/2019 with Dr. Gzumán.    Neuro:   - b/l paravertebral blocks, Tylenol 1 g QID scheduled, tramadol 50 mg q6h prn, dilaudid IV 0.2 mg q2h PRN  - PTA Remerom and Zoloft    CV: HD stable, no acute issues  - PTA metop 25 BID with hold parameters  - PTA statin  - holding PTA  ASA 81  - Warfarin as below    Resp: satting on RA, encourage IS, albuterol prn    GI/FEN:    - FLD diet, AROBF  - LR @ 50 ml/hr  - replete lytes per protocol    : UOP adequate, mo removed POD#2  - PTA finasteride and doxazosin    ID: no issues    Heme: Hgb stable.   - Holding PTA warfarin (for afib), will consider restarting tomorrow if Hgb remains stable     Ppx: subcutaneous heparin q8h given Cr mildly elevated (though back to baseline this morning), Protonix IV daily, OOB, IS  Activity: ambulate QID, PT/OT ordered  Dispo: floor - transfer to     Disposition Plan   Expected discharge in 3-4 days to prior living arrangement once ROBF.  - No wound care or ostomy needs at this time.     Entered: Liliana Ramirez 02/04/2019, 6:56 AM       Patient seen and examined with CRS fellow, Dr. Narayanan, who will discuss with staff Dr. Guzmán.  - - - - - - - - - - - - - - - - - -  Liliana Ramirez MD  General Surgery PGY-3  See [facility] for on call information prior to paging me directly. Pager [phone].

## 2019-02-05 ENCOUNTER — APPOINTMENT (OUTPATIENT)
Dept: PHYSICAL THERAPY | Facility: CLINIC | Age: 84
DRG: 331 | End: 2019-02-05
Attending: COLON & RECTAL SURGERY
Payer: COMMERCIAL

## 2019-02-05 ENCOUNTER — APPOINTMENT (OUTPATIENT)
Dept: OCCUPATIONAL THERAPY | Facility: CLINIC | Age: 84
DRG: 331 | End: 2019-02-05
Attending: COLON & RECTAL SURGERY
Payer: COMMERCIAL

## 2019-02-05 LAB
ANION GAP SERPL CALCULATED.3IONS-SCNC: 6 MMOL/L (ref 3–14)
BUN SERPL-MCNC: 22 MG/DL (ref 7–30)
CALCIUM SERPL-MCNC: 8.6 MG/DL (ref 8.5–10.1)
CHLORIDE SERPL-SCNC: 112 MMOL/L (ref 94–109)
CO2 SERPL-SCNC: 23 MMOL/L (ref 20–32)
CREAT SERPL-MCNC: 1.21 MG/DL (ref 0.66–1.25)
GFR SERPL CREATININE-BSD FRML MDRD: 55 ML/MIN/{1.73_M2}
GLUCOSE SERPL-MCNC: 111 MG/DL (ref 70–99)
HGB BLD-MCNC: 8.7 G/DL (ref 13.3–17.7)
INR PPP: 1.31 (ref 0.86–1.14)
MAGNESIUM SERPL-MCNC: 2 MG/DL (ref 1.6–2.3)
PHOSPHATE SERPL-MCNC: 3 MG/DL (ref 2.5–4.5)
POTASSIUM SERPL-SCNC: 4.2 MMOL/L (ref 3.4–5.3)
SODIUM SERPL-SCNC: 141 MMOL/L (ref 133–144)

## 2019-02-05 PROCEDURE — 25000132 ZZH RX MED GY IP 250 OP 250 PS 637: Performed by: COLON & RECTAL SURGERY

## 2019-02-05 PROCEDURE — 97110 THERAPEUTIC EXERCISES: CPT | Mod: GP

## 2019-02-05 PROCEDURE — 85610 PROTHROMBIN TIME: CPT | Performed by: COLON & RECTAL SURGERY

## 2019-02-05 PROCEDURE — 97535 SELF CARE MNGMENT TRAINING: CPT | Mod: GO

## 2019-02-05 PROCEDURE — 84100 ASSAY OF PHOSPHORUS: CPT | Performed by: SURGERY

## 2019-02-05 PROCEDURE — 36415 COLL VENOUS BLD VENIPUNCTURE: CPT | Performed by: STUDENT IN AN ORGANIZED HEALTH CARE EDUCATION/TRAINING PROGRAM

## 2019-02-05 PROCEDURE — 97116 GAIT TRAINING THERAPY: CPT | Mod: GP

## 2019-02-05 PROCEDURE — 25000132 ZZH RX MED GY IP 250 OP 250 PS 637: Performed by: STUDENT IN AN ORGANIZED HEALTH CARE EDUCATION/TRAINING PROGRAM

## 2019-02-05 PROCEDURE — 83735 ASSAY OF MAGNESIUM: CPT | Performed by: SURGERY

## 2019-02-05 PROCEDURE — 85018 HEMOGLOBIN: CPT | Performed by: STUDENT IN AN ORGANIZED HEALTH CARE EDUCATION/TRAINING PROGRAM

## 2019-02-05 PROCEDURE — 12000001 ZZH R&B MED SURG/OB UMMC

## 2019-02-05 PROCEDURE — 36415 COLL VENOUS BLD VENIPUNCTURE: CPT | Performed by: SURGERY

## 2019-02-05 PROCEDURE — 40000133 ZZH STATISTIC OT WARD VISIT

## 2019-02-05 PROCEDURE — 36415 COLL VENOUS BLD VENIPUNCTURE: CPT | Performed by: COLON & RECTAL SURGERY

## 2019-02-05 PROCEDURE — C9113 INJ PANTOPRAZOLE SODIUM, VIA: HCPCS | Performed by: COLON & RECTAL SURGERY

## 2019-02-05 PROCEDURE — 97530 THERAPEUTIC ACTIVITIES: CPT | Mod: GO

## 2019-02-05 PROCEDURE — 80048 BASIC METABOLIC PNL TOTAL CA: CPT | Performed by: SURGERY

## 2019-02-05 PROCEDURE — 97530 THERAPEUTIC ACTIVITIES: CPT | Mod: GP

## 2019-02-05 PROCEDURE — 25000128 H RX IP 250 OP 636: Performed by: COLON & RECTAL SURGERY

## 2019-02-05 PROCEDURE — 40000193 ZZH STATISTIC PT WARD VISIT

## 2019-02-05 RX ORDER — WARFARIN SODIUM 5 MG/1
5 TABLET ORAL
Status: COMPLETED | OUTPATIENT
Start: 2019-02-05 | End: 2019-02-05

## 2019-02-05 RX ORDER — BISACODYL 10 MG
10 SUPPOSITORY, RECTAL RECTAL ONCE
Status: COMPLETED | OUTPATIENT
Start: 2019-02-05 | End: 2019-02-05

## 2019-02-05 RX ORDER — BISACODYL 10 MG
10 SUPPOSITORY, RECTAL RECTAL DAILY PRN
Status: DISCONTINUED | OUTPATIENT
Start: 2019-02-05 | End: 2019-02-05

## 2019-02-05 RX ORDER — ATORVASTATIN CALCIUM 40 MG/1
40 TABLET, FILM COATED ORAL EVERY EVENING
Status: DISCONTINUED | OUTPATIENT
Start: 2019-02-05 | End: 2019-02-07 | Stop reason: HOSPADM

## 2019-02-05 RX ADMIN — SERTRALINE HYDROCHLORIDE 100 MG: 100 TABLET ORAL at 19:07

## 2019-02-05 RX ADMIN — ACETAMINOPHEN 1000 MG: 500 TABLET, FILM COATED ORAL at 17:58

## 2019-02-05 RX ADMIN — WARFARIN SODIUM 5 MG: 5 TABLET ORAL at 17:58

## 2019-02-05 RX ADMIN — BISACODYL 10 MG: 10 SUPPOSITORY RECTAL at 14:11

## 2019-02-05 RX ADMIN — Medication 5000 UNITS: at 21:35

## 2019-02-05 RX ADMIN — Medication 5000 UNITS: at 06:35

## 2019-02-05 RX ADMIN — ATORVASTATIN CALCIUM 40 MG: 40 TABLET, FILM COATED ORAL at 19:07

## 2019-02-05 RX ADMIN — ACETAMINOPHEN 1000 MG: 500 TABLET, FILM COATED ORAL at 12:36

## 2019-02-05 RX ADMIN — Medication 5000 UNITS: at 12:36

## 2019-02-05 RX ADMIN — DOXAZOSIN MESYLATE 2 MG: 2 TABLET ORAL at 08:43

## 2019-02-05 RX ADMIN — TRAMADOL HYDROCHLORIDE 50 MG: 50 TABLET, COATED ORAL at 02:53

## 2019-02-05 RX ADMIN — ACETAMINOPHEN 1000 MG: 500 TABLET, FILM COATED ORAL at 00:42

## 2019-02-05 RX ADMIN — PANTOPRAZOLE SODIUM 40 MG: 40 INJECTION, POWDER, FOR SOLUTION INTRAVENOUS at 17:58

## 2019-02-05 RX ADMIN — SODIUM CHLORIDE, POTASSIUM CHLORIDE, SODIUM LACTATE AND CALCIUM CHLORIDE: 600; 310; 30; 20 INJECTION, SOLUTION INTRAVENOUS at 12:53

## 2019-02-05 RX ADMIN — METOPROLOL TARTRATE 25 MG: 25 TABLET ORAL at 19:07

## 2019-02-05 RX ADMIN — METOPROLOL TARTRATE 25 MG: 25 TABLET ORAL at 08:43

## 2019-02-05 RX ADMIN — MIRTAZAPINE 15 MG: 15 TABLET, FILM COATED ORAL at 21:35

## 2019-02-05 RX ADMIN — ACETAMINOPHEN 1000 MG: 500 TABLET, FILM COATED ORAL at 06:57

## 2019-02-05 ASSESSMENT — PAIN DESCRIPTION - DESCRIPTORS
DESCRIPTORS: SORE
DESCRIPTORS: INTERMITTENT

## 2019-02-05 ASSESSMENT — ACTIVITIES OF DAILY LIVING (ADL)
ADLS_ACUITY_SCORE: 13

## 2019-02-05 ASSESSMENT — MIFFLIN-ST. JEOR: SCORE: 1312.25

## 2019-02-05 NOTE — PROGRESS NOTES
"Social Work: Assessment with Discharge Plan    Patient Name:  Noe Florence  :  1935  Age:  83 year old  MRN:  [id number]  Risk/Complexity Score:   Elevated  Completed assessment with:  Pt and sig other at bedside.    Presenting Information   Reason for Referral:  Discharge plan  Date of Intake:  2019  Referral Source:  Physician - recommending TCU  Decision Maker:  Pt at baseline  Alternate Decision Maker:  Significant Other Rica (P: [phone])  Health Care Directive:  Copy in Chart - Healthcare Agent is Rica.  Living Situation:  House w/ 3 stairs to enter home. Pt's bed/bath is on the 2nd level. Half bath on main level.  Previous Functional Status:  Independent  Patient and family understanding of hospitalization:  Pt admitted following Transverse Colectomy procedure for colon cancer.  Cultural/Language/Spiritual Considerations:  Pt is a 84 yo  Mu-ism male.  Adjustment to Illness:  Pt described experiencing heightened anxiety leading up to his procedure.    Physical Health  Reason for Admission:  Colon Cancer  Services Needed/Recommended:  TCU    Mental Health/Chemical Dependency  Diagnosis:  None on file  Support/Services in Place:  None  Services Needed/Recommended:  None identified.    Support System  Significant relationship at present time:  Pt's sig other Rica  Family of origin is available for support:  Chepe Cui  Other support available:  Friends  Gaps in support system:  None identified.  Patient is caregiver to:  None     Provider Information   Primary Care Physician:  Roberto Sarmiento   [phone]   Clinic:  [address] [place] [id number]      :  None    Financial   Income Source:  Retired, social security and \"small pension\"  Financial Concerns:  Denies concern at this time. States he would be unable to pay privately for TCU.  Insurance:    Payor/Plan Subscriber Name Rel Member # Group #   UCARE - UC[employer] FOR SEN* " "LEROY MACHADO  [id number]      [address]       Discharge Plan   Patient and family discharge goal:  TCU for \"about a week\"  Provided education on discharge plan:  YES  Patient agreeable to discharge plan:  YES  A list of Medicare Certified Facilities was provided to the patient and/or family to encourage patient choice.  Patient's top choices for facility are:   -[facility]  -[facility]  Pt's sig other's top choices for facility are:   - TCU  -[facility]  -[facility]  Will NH provide Skilled rehabilitation or complex medical:  YES  General information regarding anticipated insurance coverage and possible out of pocket cost was discussed. Patient and patient's family are aware patient may incur the cost of transportation to the facility, pending insurance payment: YES  Barriers to discharge:  Medical stability    Discharge Recommendations   Anticipated Disposition:  Facility:  TBD  Transportation Needs:  Medical:  Wheelchair  Name of Transportation Company and Phone:  [employer] (Ph: [phone])    Additional comments   Pt gave SW consent to send referrals to his and his sig other's top preferences for TCU placement. Pt states his goal is to return directly home but is open to a short TCU stay if it is highly recommended by the medical team.     YUE Mendosa, RICHIESW  7C Surgical/Oncology Unit   Phone: [phone]  Pager: [phone]    "

## 2019-02-05 NOTE — PHARMACY-ANTICOAGULATION SERVICE
Clinical Pharmacy - Warfarin Dosing Consult     Pharmacy has been consulted to manage this patient s warfarin therapy.  Indication: Atrial Fibrillation  Therapy Goal: INR 2-3  Provider/Team: Colorectal surgery  Warfarin Prior to Admission: Yes  Warfarin PTA Regimen: 5 mg daily  Recent documented change in oral intake/nutrition: Yes(Diet being adjusted thelma-op as tolerated)  Dose Comments: Will resume at 5mg today.    INR   Date Value Ref Range Status   2019 1.31 (H) 0.86 - 1.14 Final   2019 1.32 (H) 0.86 - 1.14 Final       Recommend warfarin 5 mg today.  Pharmacy will monitor Noe Florence daily and order warfarin doses to achieve specified goal.      Please contact pharmacy as soon as possible if the warfarin needs to be held for a procedure or if the warfarin goals change.      Jeny Ellsworth, PharmD  [phone] (text capable)

## 2019-02-05 NOTE — PROGRESS NOTES
"COLON & RECTAL SURGERY PROGRESS NOTE  02/05/2019   POD#4    Subjective:  NAEON. \"Pretty sore\" this morning. C/o back pain. \"A little\" flatus, no BM. Tolerating FLD. Ambulating. No other complaints.    Objective  Temp:  [95.5  F (35.3  C)-98.9  F (37.2  C)] 98.8  F (37.1  C)  Pulse:  [60-66] 66  Heart Rate:  [61-87] 67  Resp:  [16-18] 16  BP: ()/(49-79) 129/60  SpO2:  [89 %-99 %] 94 %    General: AAOx4, NAD, laying comfortably in bed  CV: warm, well perfused  Pulm: nonlabored breathing on room air  Abdomen: soft, mildly distended, appropriately tender, no rebound or guarding; incisions c/d/i with Dermabond  Back/flank: No obvious deformity, no erythema or ecchymosis, left flank minimally tender on palpation  Extremities: no edema, moving all spontaneously and without apparent deficit    I/O last 3 completed shifts:  In: 2002.5 [P.O.:1300; I.V.:702.5]  Out: 1375 [Urine:1375]    Labs:  Recent Labs   Lab 02/04/19 0518 02/03/19  0517 02/02/19  0808   WBC  --   --  7.9   HGB 7.9* 8.1* 7.9*   PLT  --   --  174     Recent Labs   Lab 02/04/19 0518 02/03/19  0517 02/02/19  0808 02/01/19  1108   NA  --  142 141  --    POTASSIUM  --  4.0 4.8 4.1   CHLORIDE  --  113* 112*  --    CO2  --  22 23  --    BUN  --  21 30  --    CR 1.23 1.27* 1.34* 1.69*   GLC  --  100* 83  --    KEITH  --  8.3* 8.3*  --    MAG  --   --  2.4*  --    PHOS  --   --  3.9  --        Assessment/Plan  84yo M with significant PMH: afib on warfarin, mitral valve repair (2006), NSVT s/p ICD, CAD s/p CAB (1994, 2006) and JEREMY (2012), reported h/o blood clots, CKD stage III (b/l Cr ~1.1-1.2), and 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 Now s/p lap converted to open segmental transverse colectomy on 2/1/2019 with Dr. Guzmán.    Neuro:   - b/l paravertebral blocks, Tylenol 1 g QID scheduled, tramadol 50 mg q6h prn, dilaudid IV 0.2 mg q2h PRN  - PTA Remerom and Zoloft    CV: HD stable, no acute issues  - PTA metop " 25 BID with hold parameters  - PTA statin  - holding PTA ASA 81  - Warfarin as below    Resp: satting on RA, encourage IS, albuterol prn    GI/FEN:    - FLD diet, AROBF  - Dulcolax suppository today  - LR @ 50 ml/hr  - replete lytes per protocol    : UOP adequate, mo removed POD#2  - PTA finasteride and doxazosin    ID: no issues    Heme: Hgb stable, recheck ordered  - Holding PTA warfarin (for afib), will consider restarting tomorrow if Hgb remains stable     Ppx: subcutaneous heparin q8h given Cr mildly elevated (though back to baseline this morning), Protonix IV daily, OOB, IS  Activity: ambulate QID, PT/OT ordered  Dispo: floor - transfer to     Disposition Plan   Expected discharge in 2-3 days to prior living arrangement once ROBF.  - No wound care or ostomy needs at this time.     Entered: Rafiq Dwyer 02/05/2019, 5:56 AM     Patient seen and examined with CRS fellow, Dr. Narayanan, who will discuss with staff Dr. Guzmán.  - - - - - - - - - - - - - - - - - -  Rafiq Dwyer MD  General Surgery PGY-1  Pager [phone]

## 2019-02-05 NOTE — PLAN OF CARE
6B  Discharge Planner PT   Patient plan for discharge: TCU  Current status: Sit <> stand from arm chair with SBA-CGA, requires frequent cueing for abd precautions. Pt amb 410' x2 with FWW and CGA, verbal cues for upright posture. Increased cueing for safe use of FWW when navigating through tight spaces.  Barriers to return to prior living situation: precautions, proximal weakness  Recommendations for discharge: TCU  Rationale for recommendations: Pt demonstrates impaired proximal strength, limiting pt's ability to adhere to 10lb lift restriction per abd precautions. Currently recommending TCU with potential to progress to Home with HHPT pending safety with FWW and mobility of 3 stairs without rail for home entry.       Entered by: Pema Briones 02/05/2019 10:15 AM

## 2019-02-05 NOTE — PLAN OF CARE
Alert & Orientated.  Vitals stable.Tolerating full diet and denies nausea and vomiting.  Pain comfortably manageable. Declined pain interventions.  Vitals stable.  Voiding adequate amount of urine. Abdominal midline incision and lap sites BUCKY and WDL. Ambulated twice with assist of one and walker. Passing gas. Waiting for ROBF. Continue with plan of care.     Update: Suppository given - still awaiting results

## 2019-02-05 NOTE — PLAN OF CARE
OT7C:  Discharge Planner OT   Patient plan for discharge: Home  Current status: Pt supine<>Sit EOB x3 (per pt request to practice) using log roll technique SBA with flat HOB x2, VCs for sequencing of steps. Pt sit<>stand CGA + fww from EOB and bedside chair. Completed self cares standing at sink with set up assistance SBA. Pt ambulated ~250'x2 CGA+fww with no LOB, completed tub/shower transfer. TH educated pt on LBD techniques such as figure 4 technique, needing Mari to don/doff socks, educated pt on AE available. VSS on RA.   Barriers to return to prior living situation: medical status, precautions, decreased strength and activity tolerance needed for I/ADLs and functional mobility of house hold and community distances   Recommendations for discharge: home with A   Rationale for recommendations: Pt would benefit from having Assistance in home for heavy I/ADLs 2/2 precautions, with LOS anticipate pt will progress to return home with A        Entered by: Tanesha Dickens 02/05/2019 9:25 AM

## 2019-02-05 NOTE — PLAN OF CARE
[id number]:   Pt is AOx4, VSS on RA and Assist x1 with walker and gait belt. Pain is managed with scheduled tylenol and PRN tramadol given x1 with relief. No complains of nausea. Midline incision and lap sites BUCKY with no drainage. Abd binder in place. No BM this shift, BS hypoactive, pt states passing gas. Voiding spontaneously via the urinal. PIV infusing LR at 50mL/hr. No significant changes. Will continue to monitor.

## 2019-02-05 NOTE — PLAN OF CARE
Pt arrived on 7C from 6B around 1730. AVSS on RA. Pt has permanent pacemaker. Pt taking sched tylenol for abd pain management, reporting soreness around incisions. Midline and lap sites BUCKY, c/d/i. Abd binder in place. Hypo BS, passing gas. Waiting for BM. Voiding spont, pt had incont episode due to urgency to void. Placed urinal at bedside in reach of pt. Full liquids, denies nausea. Good amt of PO intake. Pt up w/ assist of 1, gait belt, and walker. Pt uses call light appropriately, reminded him to call if he needs assistance getting out of bed. PIV infusing MIVF @ 50 mL/hr. Cont to monitor bowel function. Cont w/ POC.

## 2019-02-06 ENCOUNTER — APPOINTMENT (OUTPATIENT)
Dept: OCCUPATIONAL THERAPY | Facility: CLINIC | Age: 84
DRG: 331 | End: 2019-02-06
Attending: COLON & RECTAL SURGERY
Payer: COMMERCIAL

## 2019-02-06 ENCOUNTER — APPOINTMENT (OUTPATIENT)
Dept: PHYSICAL THERAPY | Facility: CLINIC | Age: 84
DRG: 331 | End: 2019-02-06
Attending: COLON & RECTAL SURGERY
Payer: COMMERCIAL

## 2019-02-06 LAB — INR PPP: 1.36 (ref 0.86–1.14)

## 2019-02-06 PROCEDURE — 25000132 ZZH RX MED GY IP 250 OP 250 PS 637: Performed by: COLON & RECTAL SURGERY

## 2019-02-06 PROCEDURE — 97110 THERAPEUTIC EXERCISES: CPT | Mod: GP

## 2019-02-06 PROCEDURE — 12000001 ZZH R&B MED SURG/OB UMMC

## 2019-02-06 PROCEDURE — 36415 COLL VENOUS BLD VENIPUNCTURE: CPT | Performed by: COLON & RECTAL SURGERY

## 2019-02-06 PROCEDURE — 97530 THERAPEUTIC ACTIVITIES: CPT | Mod: GP

## 2019-02-06 PROCEDURE — 85610 PROTHROMBIN TIME: CPT | Performed by: COLON & RECTAL SURGERY

## 2019-02-06 PROCEDURE — 25000132 ZZH RX MED GY IP 250 OP 250 PS 637: Performed by: STUDENT IN AN ORGANIZED HEALTH CARE EDUCATION/TRAINING PROGRAM

## 2019-02-06 PROCEDURE — 97535 SELF CARE MNGMENT TRAINING: CPT | Mod: GO

## 2019-02-06 PROCEDURE — 40000133 ZZH STATISTIC OT WARD VISIT

## 2019-02-06 PROCEDURE — 40000193 ZZH STATISTIC PT WARD VISIT

## 2019-02-06 PROCEDURE — 97116 GAIT TRAINING THERAPY: CPT | Mod: GP

## 2019-02-06 PROCEDURE — 97530 THERAPEUTIC ACTIVITIES: CPT | Mod: GO

## 2019-02-06 PROCEDURE — 25000128 H RX IP 250 OP 636: Performed by: COLON & RECTAL SURGERY

## 2019-02-06 RX ORDER — PANTOPRAZOLE SODIUM 40 MG/1
40 TABLET, DELAYED RELEASE ORAL
Status: DISCONTINUED | OUTPATIENT
Start: 2019-02-06 | End: 2019-02-07 | Stop reason: HOSPADM

## 2019-02-06 RX ORDER — WARFARIN SODIUM 5 MG/1
5 TABLET ORAL
Status: COMPLETED | OUTPATIENT
Start: 2019-02-06 | End: 2019-02-06

## 2019-02-06 RX ADMIN — Medication 5000 UNITS: at 04:59

## 2019-02-06 RX ADMIN — SERTRALINE HYDROCHLORIDE 100 MG: 100 TABLET ORAL at 21:08

## 2019-02-06 RX ADMIN — ATORVASTATIN CALCIUM 40 MG: 40 TABLET, FILM COATED ORAL at 21:08

## 2019-02-06 RX ADMIN — WARFARIN SODIUM 5 MG: 5 TABLET ORAL at 17:50

## 2019-02-06 RX ADMIN — MIRTAZAPINE 15 MG: 15 TABLET, FILM COATED ORAL at 21:08

## 2019-02-06 RX ADMIN — ACETAMINOPHEN 1000 MG: 500 TABLET, FILM COATED ORAL at 12:28

## 2019-02-06 RX ADMIN — ACETAMINOPHEN 1000 MG: 500 TABLET, FILM COATED ORAL at 17:49

## 2019-02-06 RX ADMIN — METOPROLOL TARTRATE 25 MG: 25 TABLET ORAL at 07:48

## 2019-02-06 RX ADMIN — METOPROLOL TARTRATE 25 MG: 25 TABLET ORAL at 21:08

## 2019-02-06 RX ADMIN — ACETAMINOPHEN 1000 MG: 500 TABLET, FILM COATED ORAL at 06:50

## 2019-02-06 RX ADMIN — ACETAMINOPHEN 1000 MG: 500 TABLET, FILM COATED ORAL at 00:11

## 2019-02-06 RX ADMIN — Medication 5000 UNITS: at 21:09

## 2019-02-06 RX ADMIN — PANTOPRAZOLE SODIUM 40 MG: 40 TABLET, DELAYED RELEASE ORAL at 13:42

## 2019-02-06 RX ADMIN — Medication 5000 UNITS: at 12:29

## 2019-02-06 RX ADMIN — DOXAZOSIN MESYLATE 2 MG: 2 TABLET ORAL at 07:48

## 2019-02-06 ASSESSMENT — ACTIVITIES OF DAILY LIVING (ADL)
ADLS_ACUITY_SCORE: 13

## 2019-02-06 ASSESSMENT — MIFFLIN-ST. JEOR: SCORE: 1312.7

## 2019-02-06 ASSESSMENT — PAIN DESCRIPTION - DESCRIPTORS: DESCRIPTORS: SORE

## 2019-02-06 NOTE — PLAN OF CARE
Discharge Planner PT  - 7C  Patient plan for discharge: Home with HH therapies, however wife prefers TCU.  Current status: Pt completes supine <> sit from flat bed with CGA, requires cues for log roll technique. Pt transfers sit <> stand with cane + SBA from standard chair heights, requires cane + Jose Manuel when transferring from low chair heights. Pt ambulates ~250ft and ~400ft with cane + CGA, progressing to SBA. Pt with decreased gait speed and step length, though overall steady on feet with no LOB. Pt ascends/descends 4 steps 3x with B HR + CGA, progressing to SBA. Pt participates in seated/standing LE therex, provided handouts. Occasional seated rest breaks taken throughout PT session 2/2 fatigue.  Barriers to return to prior living situation: Medical needs, LE weakness, impaired balance, decreased activity tolerance, abdominal precautions, stairs to enter/within home.  Recommendations for discharge: Anticipate home with assist and HH therapies.  Rationale for recommendations: Current level of function - see above.

## 2019-02-06 NOTE — PLAN OF CARE
POD 4. AVSS on RA. Pt denies pain, taking sched tylenol. Abd incisions BUCKY, c/d/i. Hypo BS, passing gas. X2 loose BMs after suppository was given during day shift. Full liquids, good appetite. Denies nausea. Voiding spont. Assist of 1 and walker. Ambulated in room, planning to go for walk tonight. R PIV infusing LR @ 50 mL/hr. Cont to monitor ROBF, cont w/ POC.

## 2019-02-06 NOTE — PLAN OF CARE
I talked face to face with Dr ANGIE Dwyer concerning BP: 89/46 P: 63. Pt denies any symptoms of dizziness. Presently he is sitting in the chair. He has walked in the fontaine with walker and SBA. Mr Florence stood at the sink to brush his teeth. No new orders. I will assess BP again. Wife at bedside. Bedside report: yes.

## 2019-02-06 NOTE — PROGRESS NOTES
COLON & RECTAL SURGERY PROGRESS NOTE  02/06/2019   POD#5    Subjective:  NAEON. First BM after suppository yesterday. Two BMs overnight. Pain controlled this morning. Ate tomato soup last night, tolerating diet. No N/V. He has not been ambulating as much as he would like. He and his wife met with SW to discuss placement yesterday.    Objective  Temp:  [95  F (35  C)-98.3  F (36.8  C)] 97.9  F (36.6  C)  Pulse:  [60-63] 63  Heart Rate:  [63-94] 72  Resp:  [16-18] 18  BP: ()/(49-68) 128/68  SpO2:  [95 %-100 %] 97 %    General: AAOx4, NAD, laying comfortably in bed  CV: warm, well perfused  Pulm: nonlabored breathing on room air  Abdomen: soft, less distended, appropriately tender, no rebound or guarding; incisions c/d/i with Dermabond  Extremities: no edema, moving all spontaneously and without apparent deficit    I/O last 3 completed shifts:  In: 1380 [P.O.:180; I.V.:1200]  Out: 525 [Urine:525]    Labs:  Recent Labs   Lab 02/05/19  0714 02/04/19  0518 02/03/19  0517 02/02/19  0808   WBC  --   --   --  7.9   HGB 8.7* 7.9* 8.1* 7.9*   PLT  --   --   --  174     Recent Labs   Lab 02/05/19  0556 02/04/19  0518 02/03/19  0517 02/02/19  0808     --  142 141   POTASSIUM 4.2  --  4.0 4.8   CHLORIDE 112*  --  113* 112*   CO2 23  --  22 23   BUN 22  --  21 30   CR 1.21 1.23 1.27* 1.34*   *  --  100* 83   KEITH 8.6  --  8.3* 8.3*   MAG 2.0  --   --  2.4*   PHOS 3.0  --   --  3.9       Assessment/Plan  82yo M with significant PMH: afib on warfarin, mitral valve repair (2006), NSVT s/p ICD, CAD s/p CAB (1994, 2006) and JEREMY (2012), reported h/o blood clots, CKD stage III (b/l Cr ~1.1-1.2), and 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 Now s/p lap converted to open segmental transverse colectomy on 2/1/2019 with Dr. Guzmán.    Neuro:   - b/l paravertebral blocks, Tylenol 1 g QID scheduled, tramadol 50 mg q6h prn, dilaudid IV 0.2 mg q2h PRN  - PTA Remerom and  Zoloft    CV: HD stable, no acute issues  - PTA metop 25 BID with hold parameters  - PTA statin  - holding PTA ASA 81  - Warfarin as below    Resp: satting on RA, encourage IS, albuterol prn    GI/FEN:    - LRD  - IVF stopped  - replete lytes per protocol    : UOP adequate, mo removed POD#2  - PTA finasteride and doxazosin    ID: no issues    Heme: Hgb stable, recheck ordered now that anticoagulation restarted  - Warfarin restarted 2/5     Ppx: warfarin, Protonix IV daily, OOB, IS  Activity: ambulate QID, PT/OT ordered  Dispo: floor - 7C    Disposition Plan   Expected discharge in 2-3 days to prior living arrangement once ROBF.  - No wound care or ostomy needs at this time.     Entered: Rafiq Dwyer 02/06/2019, 6:38 AM     Patient seen and examined with CRS fellow, Dr. Narayanan, who will discuss with staff Dr. Guzmán.  - - - - - - - - - - - - - - - - - -  Rafiq Dwyer MD  General Surgery PGY-1  Pager [phone]

## 2019-02-06 NOTE — PROGRESS NOTES
"Social Work Services Progress Note    Hospital Day: 5  Date of Initial Social Work Evaluation:  2/5/19  Collaborated with:  Chart review, TCUs, medical team    Data:  SW involved for discharge planning - TCU was recommended inially. Per conversation w/ PT/OT, Home w/ assist is now recommended. Pt is able to ambulate extensive distances safely as well as complete stairs.     Pt was denied from multiple TCUs due to no skilled inpatient therapy need (per notes leading up to 2/5). SW discussed w/ PT and home is now recommended. Pt feels he is safe to return home, but his significant other expressing more anxiety re: plan home. SW met with Pt and sig other multiple times to discuss discharge planning options. SW encouraged Pt and family to have additional friend/family support initially upon discharge home. Wife's main concern is the home is \"packed with stuff\" and has \"limited walkways.\" SW discussed home care and a safety evaluation w/ recommendations to assist. She states \"I know what needs to be moved, I just need to do it.\" They called a friend who will assist Patient into the home (up the 2 stairs) and support the wife upon discharge. Pt's son is also local, he works typically 2PM -10 PM and will support Pt and wife upon discharge. Pt plans to ask therapy to practice car transfers prior to discharge.     Due to concern with the snow, Pt's wife asked for WC transport to be set up. SW explained the likely potential for private pay cost (informed of $70 base fee + $4.50 per mile), they expressed understanding and are agreeable to pay. Pt's wife and friend will meet them at the home to assist getting into the home.     In preparation for discharge, WC transport via [facility] (Ph: [phone])  at 1 PM to home scheduled per Pt and family request.     See below for status of TCU referrals:   Pt's top choices for facility are:  -[facility] (P:[phone]; F: [phone]) - reviewing " referral this AM; only private room openings (add'l $15/day) - declined d/t no skilled therapy need (notes up to 2/5).  -[facility] (P:[phone]; F:[phone]) - left VM   Pt's sig other's top choices for facility are:   -[facility] - declined d/t bed availability & no skilled need  -[facility] (P:[phone]; F: [phone]) - left VM; still awaiting return call  -[facility] (P: [phone]; F:[phone]) - reviewing referral this AM; have a couple open beds and many male referrals currently and likely unable to accept.    Intervention:  Discharge planning    Assessment:  Pt is pleasant and open to SW visit. Pt states he feels comfortable with returning home and wishes to do so. Pt's wife is more concerned with coordinating discharge planning and arranging support upon discharge. Pt and wife contemplating between Home Care vs Outpatient therapy.     Plan:    Anticipated Disposition:  Facility:  TBD    Barriers to d/c plan:  Medical stability and rehab placement    Follow Up:  SW to f/u & assist as needed.    YUE Mendosa, ERAN  7C Surgical/Oncology Unit   Phone: [phone]  Pager: [phone]

## 2019-02-06 NOTE — PROGRESS NOTES
Care Coordinator - Discharge Planning    Admission Date/Time:  2/1/2019  Attending MD:  Chanelle Guzmán *     Data  Date of initial CC assessment:  Mr. Florence has been followed by the inpatient Care Management Team since admission for surgery.  Chart reviewed, discussed with interdisciplinary team.   Patient was admitted for:   1. Malignant neoplasm of colon, unspecified part of colon (H)         Assessment   After talk with patient and his spouse to inquiry about a home care agency of choice, the following home car plans of care were arranged for patient when he is stable to discharge:    Please fax discharge orders to [facility]     Ph:  [phone]     Fax: [phone]     Skilled home care RN for initial home safety evaluation and 1-3 times a week to evaluate medication management, nutrition and hydration evaluation, endurance evaluation, and general status evaluation after discharge from the acute care hospital setting.     Skilled home care RN to assist with management and education reinforcement with home lovenox injections as prescribed.     Skilled home care Physical Therapist and Occupational Therapist to evaluate and treat in home setting per recommendations of the Inpatient Rehabilitation Consult Team.     Coordination of Care and Referrals: Provided patient/family with options for Home Care agency of choice in home area.      Plan  Anticipated Discharge Date:  To be determined.  Anticipated Discharge Plan:  As above and per update from the MD team on the day of dischargel    CTS Handoff completed:  (Clinic Letter)  Sent.    Rita Perez, B.S.N., P.H.N.,R.N.         Pager

## 2019-02-06 NOTE — PLAN OF CARE
AVSS. Pain managed with scheduled Tylenol and repositioning. On a full liquid diet, denied nausea. Midline incision and lap sites dermabond and BUCKY. Bowel sounds active, patient had two BMs overnight. Voiding spontaneously (not saved due to urgency for BM). Up with assist of 1 and walker. Wife at the bedside. Continue with plan of care.

## 2019-02-06 NOTE — PLAN OF CARE
OT 7C  Discharge Planner OT   Patient plan for discharge: home   Current status: Pt IND at sink for g/h tasks. Pt requiring min A for donning shorts but progressing to SBA following education on alternative technique. Min A for donning shoes and AFO (anticipate ability to do SBA if wearing normal vs hospital socks). Pt requiring frequent VCs initially for adherence to precautions but significant improvement with duration of session. Pt SBA for functional mobility with SPC and completing simulated car transfer SBA with VCs.   Barriers to return to prior living situation: none   Recommendations for discharge: home with assist and home therapy   Rationale for recommendations: Pt progressing well with ADLs. Will benefit from further therapy in the home to return to PLOF within abdominal precautions.       Entered by: Tanesha Ford 02/06/2019 4:08 PM

## 2019-02-07 ENCOUNTER — APPOINTMENT (OUTPATIENT)
Dept: OCCUPATIONAL THERAPY | Facility: CLINIC | Age: 84
DRG: 331 | End: 2019-02-07
Attending: COLON & RECTAL SURGERY
Payer: COMMERCIAL

## 2019-02-07 ENCOUNTER — APPOINTMENT (OUTPATIENT)
Dept: PHYSICAL THERAPY | Facility: CLINIC | Age: 84
DRG: 331 | End: 2019-02-07
Attending: COLON & RECTAL SURGERY
Payer: COMMERCIAL

## 2019-02-07 VITALS
TEMPERATURE: 97.8 F | RESPIRATION RATE: 14 BRPM | HEIGHT: 68 IN | OXYGEN SATURATION: 96 % | SYSTOLIC BLOOD PRESSURE: 118 MMHG | HEART RATE: 63 BPM | DIASTOLIC BLOOD PRESSURE: 49 MMHG | WEIGHT: 141.8 LBS | BODY MASS INDEX: 21.49 KG/M2

## 2019-02-07 LAB
ANION GAP SERPL CALCULATED.3IONS-SCNC: 9 MMOL/L (ref 3–14)
BUN SERPL-MCNC: 40 MG/DL (ref 7–30)
CALCIUM SERPL-MCNC: 8.3 MG/DL (ref 8.5–10.1)
CHLORIDE SERPL-SCNC: 114 MMOL/L (ref 94–109)
CO2 SERPL-SCNC: 20 MMOL/L (ref 20–32)
CREAT SERPL-MCNC: 1.27 MG/DL (ref 0.66–1.25)
GFR SERPL CREATININE-BSD FRML MDRD: 52 ML/MIN/{1.73_M2}
GLUCOSE SERPL-MCNC: 93 MG/DL (ref 70–99)
HGB BLD-MCNC: 7.7 G/DL (ref 13.3–17.7)
INR PPP: 1.69 (ref 0.86–1.14)
MAGNESIUM SERPL-MCNC: 1.8 MG/DL (ref 1.6–2.3)
PHOSPHATE SERPL-MCNC: 2.5 MG/DL (ref 2.5–4.5)
POTASSIUM SERPL-SCNC: 3.5 MMOL/L (ref 3.4–5.3)
SODIUM SERPL-SCNC: 144 MMOL/L (ref 133–144)

## 2019-02-07 PROCEDURE — 25000132 ZZH RX MED GY IP 250 OP 250 PS 637: Performed by: COLON & RECTAL SURGERY

## 2019-02-07 PROCEDURE — 85610 PROTHROMBIN TIME: CPT | Performed by: STUDENT IN AN ORGANIZED HEALTH CARE EDUCATION/TRAINING PROGRAM

## 2019-02-07 PROCEDURE — 97535 SELF CARE MNGMENT TRAINING: CPT | Mod: GO

## 2019-02-07 PROCEDURE — 25000128 H RX IP 250 OP 636: Performed by: COLON & RECTAL SURGERY

## 2019-02-07 PROCEDURE — 40000193 ZZH STATISTIC PT WARD VISIT

## 2019-02-07 PROCEDURE — 85018 HEMOGLOBIN: CPT | Performed by: STUDENT IN AN ORGANIZED HEALTH CARE EDUCATION/TRAINING PROGRAM

## 2019-02-07 PROCEDURE — 40000133 ZZH STATISTIC OT WARD VISIT

## 2019-02-07 PROCEDURE — 80048 BASIC METABOLIC PNL TOTAL CA: CPT | Performed by: STUDENT IN AN ORGANIZED HEALTH CARE EDUCATION/TRAINING PROGRAM

## 2019-02-07 PROCEDURE — 83735 ASSAY OF MAGNESIUM: CPT | Performed by: STUDENT IN AN ORGANIZED HEALTH CARE EDUCATION/TRAINING PROGRAM

## 2019-02-07 PROCEDURE — 25000132 ZZH RX MED GY IP 250 OP 250 PS 637: Performed by: STUDENT IN AN ORGANIZED HEALTH CARE EDUCATION/TRAINING PROGRAM

## 2019-02-07 PROCEDURE — 97530 THERAPEUTIC ACTIVITIES: CPT | Mod: GP

## 2019-02-07 PROCEDURE — 84100 ASSAY OF PHOSPHORUS: CPT | Performed by: STUDENT IN AN ORGANIZED HEALTH CARE EDUCATION/TRAINING PROGRAM

## 2019-02-07 PROCEDURE — 36415 COLL VENOUS BLD VENIPUNCTURE: CPT | Performed by: STUDENT IN AN ORGANIZED HEALTH CARE EDUCATION/TRAINING PROGRAM

## 2019-02-07 RX ORDER — ACETAMINOPHEN 500 MG
1000 TABLET ORAL 4 TIMES DAILY
Qty: 240 TABLET | Refills: 0 | Status: ON HOLD | OUTPATIENT
Start: 2019-02-07 | End: 2019-03-12

## 2019-02-07 RX ORDER — TRAMADOL HYDROCHLORIDE 50 MG/1
50 TABLET ORAL EVERY 6 HOURS PRN
Qty: 12 TABLET | Refills: 0 | Status: SHIPPED | OUTPATIENT
Start: 2019-02-07 | End: 2019-03-12

## 2019-02-07 RX ORDER — WARFARIN SODIUM 7.5 MG/1
7.5 TABLET ORAL
Status: DISCONTINUED | OUTPATIENT
Start: 2019-02-07 | End: 2019-02-07 | Stop reason: HOSPADM

## 2019-02-07 RX ORDER — WARFARIN SODIUM 7.5 MG/1
7.5 TABLET ORAL ONCE
Qty: 1 TABLET | Refills: 0 | Status: SHIPPED | OUTPATIENT
Start: 2019-02-08 | End: 2019-03-12

## 2019-02-07 RX ADMIN — Medication 5000 UNITS: at 05:09

## 2019-02-07 RX ADMIN — METOPROLOL TARTRATE 25 MG: 25 TABLET ORAL at 09:07

## 2019-02-07 RX ADMIN — ACETAMINOPHEN 1000 MG: 500 TABLET, FILM COATED ORAL at 00:42

## 2019-02-07 RX ADMIN — DOXAZOSIN MESYLATE 2 MG: 2 TABLET ORAL at 09:06

## 2019-02-07 RX ADMIN — PANTOPRAZOLE SODIUM 40 MG: 40 TABLET, DELAYED RELEASE ORAL at 09:07

## 2019-02-07 RX ADMIN — ACETAMINOPHEN 1000 MG: 500 TABLET, FILM COATED ORAL at 12:05

## 2019-02-07 RX ADMIN — ACETAMINOPHEN 1000 MG: 500 TABLET, FILM COATED ORAL at 06:43

## 2019-02-07 ASSESSMENT — ACTIVITIES OF DAILY LIVING (ADL)
ADLS_ACUITY_SCORE: 13

## 2019-02-07 ASSESSMENT — PAIN DESCRIPTION - DESCRIPTORS: DESCRIPTORS: ACHING;CONSTANT

## 2019-02-07 NOTE — PLAN OF CARE
A&Ox4. VSS.Pain is managed with schedule tylenol. No acute overnight event. On low fiber diet. Denied pain and  N/V.Patient has been passing flatus and had a BM on evening shift.Slept all night with no complains.Unmeasure voiding, patient has a brief on.Up with one assist and a walker. Possible discharge today. Continue to monitor.

## 2019-02-07 NOTE — PLAN OF CARE
Discharge Planner PT - 7C  Patient plan for discharge: Home with HH therapies.  Current status: Pt completes supine <> sit from flat bed with Jose Manuel initially, progressing to CGA-SBA after cues provided for technique. Pt transfers sit <> stand with cane + SBA-supervision.   Barriers to return to prior living situation: Medical needs, LE weakness, impaired balance, decreased activity tolerance, abdominal precautions, stairs to enter/within home.  Recommendations for discharge: Home with assist and HH therapies.  Rationale for recommendations: Current level of function - see above.

## 2019-02-07 NOTE — PROGRESS NOTES
Transition Planning Update    D:  The following home care plans of care have been updated to include INR lab draws in home setting:    Please fax discharge orders to [facility]     Ph:  [phone]     Fax: [phone]     Skilled home care RN for initial home safety evaluation and 1-3 times a week to evaluate medication management, nutrition and hydration evaluation, endurance evaluation, and general status evaluation after discharge from the acute care hospital setting.     Skilled home care RN to assist with management and education reinforcement with home lovenox injections as prescribed.     Skilled home care RN to draw an INR Lab on Friday February 8, 2019 and report results to the [facility] at phone [phone] and fax [phone].  Primary MD is Dr. Torsten M.D., [facility].  INR Goal is 2-3     Skilled home care Physical Therapist and Occupational Therapist to evaluate and treat in home setting per recommendations of the Inpatient Rehabilitation Consult Team.     A/P:  Patient will discharge today and will follow up in clinic as designated in discharge orders.    Rita Perez, B.S.N., P.H.N.,R.N.         Pager     Addendum:Per MD Dr. Rafiq Dwyer,  it is okay for patient to be admitted to home care services on Saturday February 9, 2019 and home care RN to call results to Dr. Liliana Ramirez at pager  on Saturday February 9, 2019 prior to 11:30 a.m.  Thank You.

## 2019-02-07 NOTE — PLAN OF CARE
Mr Florence left 7C via [facility] transport in a w/c (Wilberto from transport was able to correctly state Mr Florence's home address). I talked to his wife via phone --presently she was driving to the hospital, she stated she would return home to be there to help Bill get into the house. Pt is able to state the Coumadin dose (7.5 mg) for this evening and is able to state that means he takes 1 1/2 of his 5 mg tablets that he has at home. He declined the need for the Tramadol RX so I informed him he can pick that up at the 3 rd floor Pharm at a later date if he needs it. Howard ULLOA NST will fax the discharge information to [facility].

## 2019-02-07 NOTE — PLAN OF CARE
OT 7C  Discharge Planner OT   Patient plan for discharge: home   Current status: Pt completing shower task mod IND with use of grab bars and shower chair given setup of environment for safety. Requiring VCs and min A for doffing socks and briefs to ensure adherence abdominal precautions but able to don socks and briefs SBA with use of figure four technique. SBA for functional mobility within the room with FWW.   Barriers to return to prior living situation: none   Recommendations for discharge: home with assist   Rationale for recommendations: Pt progressing well; will benefit from supervision for LB dressing to ensure adherence to abdominal precautions as pt does better with reminders/cues.       Entered by: Tanesha Ford 02/07/2019 11:52 AM     Occupational Therapy Discharge Summary    Reason for therapy discharge:    Discharged to home with home therapy.    Progress towards therapy goal(s). See goals on Care Plan in [facility] electronic health record for goal details.  Goals partially met.  Barriers to achieving goals:   discharge from facility.    Therapy recommendation(s):    Continued therapy is recommended.  Rationale/Recommendations:  see above.

## 2019-02-07 NOTE — PLAN OF CARE
Vitally stable, no complaints of pain or nausea. Up with walker and standby assist in room. Incontinent of stool x1. 150 mL measured void, another unmeasured. Good appetite on low fiber diet. Probable discharge to home tomorrow.

## 2019-02-07 NOTE — DISCHARGE SUMMARY
[facility]  Discharge Summary  Colon and Rectal Surgery     Noe Florence MRN# [id number]   YOB: 1935 Age: 83 year old     Date of Admission:  2/1/2019  Date of Discharge::  2/7/2019  1:15 PM  Admitting Physician:  Chanelle Guzmán MD  Discharge Physician:  Rafiq Dwyer MD  Primary Care Physician:        Roberto Sarmiento          Admission Diagnoses:   Colon Cancer          Discharge Diagnosis:   Colon Cancer s/p transverse colectomy         Procedures:   Procedure(s):  Laparoscopic Converted to Open Transverse Colectomy with Lysis of Adhesions          Consultations:   PHYSICAL THERAPY ADULT IP CONSULT  OCCUPATIONAL THERAPY ADULT IP CONSULT  VASCULAR ACCESS CARE ADULT IP CONSULT  PHARMACY TO DOSE WARFARIN          Brief History of Illness:   Noe Florence is a 83 year old male with an extensive cardiac history and 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 He did develop significant weight loss first noted at on oncology visit on 10/2/18. A colonoscopy was obtained and showed a hepatic flexure mass. Biopsy revealed moderately differentiated adenocarcinoma. A follow up CT scan did not show any concern for metastatic lesions. He presented to colorectal surgery clinic to discuss surgical options. The risks, benefits, and alternatives to surgery were discussed. He agreed to proceed with surgical intervention and provided informed consent.           Hospital Course:   The patient underwent the above stated procedures on 2/1/19, which he tolerated well without immediate complications. He was transferred to the PACU and then to intermediate care for continuous cardiac telemetry as a precautionary measure considering his significant cardiac history. He remained hemodynamically stable. He was eventually transferred to surgical floor on POD#3. The remainder of his  postoperative course was unremarkable. Dominguez was removed on POD#2. He had return of bowel function on POD#4-5. His diet was slowly advanced during his post-operative course and was well-tolerated. On POD#4 he was restarted on his warfarin for history of atrial fibrillation. Physical and Occupation therapy were consulted. They recommended that the patient be discharged home with assistance and home therapy. On the day of discharge, he was tolerating a low residue diet, his pain was well controlled with oral pain medications, he was voiding spontaneously, and ambulating independently. He was discharged with a plan to have home health recheck his INR on 2/9 and 2/11 and communicate the result with [facility] Patient Monitoring so as to receive instructions about warfarin dosing. He will plan to follow up with his PCP regarding this hospitalization and further warfarin dosing instructions the week after discharge. He was discharged with Home health RN, as well as home PT/OT. He will follow up with Cristal Asher NP in 1 week and Dr. Guzmán 2-3 weeks after that appointment.           Imaging Studies:     Results for orders placed or performed during the hospital encounter of 02/01/19   POC US GUIDANCE NEEDLE PLACEMENT    Impression    Bilateral Posterior TAP/QL blocks     *Note: Due to a large number of results and/or encounters for the requested time period, some results have not been displayed. A complete set of results can be found in Results Review.              Medications Prior to Admission:     No medications prior to admission.              Discharge Medications:     Discharge Medication List as of 2/7/2019  1:37 PM      START taking these medications    Details   acetaminophen (TYLENOL) 500 MG tablet Take 2 tablets (1,000 mg) by mouth 4 times daily, Disp-240 tablet, R-0, E-Prescribe      traMADol (ULTRAM) 50 MG tablet Take 1 tablet (50 mg) by mouth every 6 hours as needed for  moderate pain, Disp-12 tablet, R-0, Local Print      !! warfarin (COUMADIN) 7.5 MG tablet Take 1 tablet (7.5 mg) by mouth once for 1 dose, Disp-1 tablet, R-0, E-Prescribe       !! - Potential duplicate medications found. Please discuss with provider.      CONTINUE these medications which have NOT CHANGED    Details   aspirin 81 MG tablet Take 1 tablet by mouth daily., Historical      atorvastatin (LIPITOR) 40 MG tablet Take 1 tablet (40 mg) by mouth daily as directed., Disp-90 tablet, R-0, E-Prescribe      Blood Pressure Monitor KIT 1 Units daily, Disp-1 kit, R-0, E-Prescribe      Cholecalciferol (VITAMIN D-3 PO) Take 1,000 Units by mouth 2 times daily , Historical      ciclopirox (LOPROX) 0.77 % cream Apply topically 2 times daily To feet and toenails.Disp-90 g, [id number]      cyanocobalamin (VITAMIN  B-12) 1000 MCG tablet Take 2 tablets (2,000 mcg) by mouth daily, Disp-180 tablet, R-3, E-Prescribe      doxazosin (CARDURA) 2 MG tablet Take 1/2 tab in the morning and 1/2 tab in the evening, Disp-90 tablet, R-3, E-Prescribe      ketoconazole (NIZORAL) 2 % cream Apply topically daily On holdDisp-60 g, [id number]      loratadine (CLARITIN) 10 MG tablet Take 10 mg by mouth daily as needed , Historical      metoprolol tartrate (LOPRESSOR) 25 MG tablet Take 1 tablet (25 mg) by mouth 2 times daily, Disp-180 tablet, R-3, E-Prescribe      mirtazapine (REMERON) 15 MG tablet Take 15 mg by mouth At Bedtime., Historical      Multiple Vitamins-Minerals (CENTRUM SILVER) per tablet Take 1 tablet by mouth daily. AM , Historical      order for DME 20-30 mm Hg knee length compression stockings.  Measure and fit.  Style and color per patient preference.  Doff n Aracelis per patient need.Disp-1 Units, R-0, Local Print      sertraline (ZOLOFT) 100 MG tablet Take 100 mg by mouth every evening., Historical      !! warfarin (COUMADIN) 5 MG tablet Take 1 tablet by mouth daily or as directed by the Coumadin clinic., Disp-90 tablet,  R-3, E-PrescribeToby BYRON Sharp as per Warfarin agreement with Dr. Baird.       !! - Potential duplicate medications found. Please discuss with provider.      STOP taking these medications       cephalexin 500 MG tablet Comments:   Reason for Stopping:         magnesium citrate solution Comments:   Reason for Stopping:         metroNIDAZOLE (FLAGYL) 500 MG tablet Comments:   Reason for Stopping:         neomycin (MYCIFRADIN) 500 MG tablet Comments:   Reason for Stopping:         ondansetron (ZOFRAN) 4 MG tablet Comments:   Reason for Stopping:         polyethylene glycol (GOLYTELY/NULYTELY) 236 g suspension Comments:   Reason for Stopping:         polyethylene glycol (MIRALAX) packet Comments:   Reason for Stopping:                       Medications Discontinued or Adjusted During This Hospitalization:   As above.           Antibiotics Prescribed at Discharge:   None prescribed           Day of Discharge Physical Exam:   Temp:  [95.6  F (35.3  C)-97.8  F (36.6  C)] 97.8  F (36.6  C)  Heart Rate:  [65-75] 70  Resp:  [14-18] 14  BP: ()/(49-64) 118/49  SpO2:  [96 %-98 %] 96 %    General: AAOx4, NAD, laying comfortably in bed  CV: warm, well perfused  Pulm: nonlabored breathing on room air  Abdomen: soft, less distended than prior exam, appropriately tender, no rebound or guarding; incisions c/d/i with Dermabond  Extremities: no edema, moving all spontaneously and without apparent deficit                   Final Pathology Result:   Surgical Pathology pending.           Discharge Instructions and Follow-Up:     Discharge Procedure Orders   Home care nursing referral   Referral Type: Home Health Therapies & Aides   Number of Visits Requested: 1     MD face to face encounter   Order Comments: Documentation of Face to Face and Certification for Home Health Services    I certify that patient: Noe Florence is under my care and that I, or a nurse practitioner or physician's assistant working with me, had a  face-to-face encounter that meets the physician face-to-face encounter requirements with this patient on: February 6, 2019.    This encounter with the patient was in whole, or in part, for the following medical condition, which is the primary reason for home health care: Colon Cancer and status post whipple surgery.    I certify that, based on my findings, the following services are medically necessary home health services: Skilled home care RN, PT and OT in home setting.    My clinical findings support the need for the above services because: Per recommendations of the inpatient rehabilitation consult team.    Further, I certify that my clinical findings support that this patient is homebound secondary to deconditioning after surgery and initially had recommendations for a short term TCU stay.    Based on the above findings. I certify that this patient is confined to the home and needs intermittent skilled nursing care, physical therapy and Occupational therapy..  The patient is under my care, and I have initiated the establishment of the plan of care.  This patient will be followed by a physician who will periodically review the plan of care.  Physician/Provider to provide follow up care: Roberto Sarmiento    Attending \[facility]\"" physician (the Medicare certified [place] provider): {Dr. Chanelle Floyd M.d.Physician Signature: See electronic signature associated with these discharge orders.    Date: 2/6/2019     Reason for your hospital stay   Order Comments: You were in the hospital for surgery for colon cancer.     Adult [facility] Follow-up and recommended labs and tests   Order Comments: 1.  You will need to follow-up with Cristal Asher NP in the Colon and Rectal Surgery clinic in 1 week. You will follow up with Dr. Guzmán 2-3 weeks after that appointment.  Please contact our clinic scheduler Leena Arnold or Eliane Boland (phone # [phone]) if you have not heard from our clinic  "in 3 business days afer discharge to schedule a follow-up appointment.    2.  Follow up with your primary care provider in 1-2 weeks after discharge from the hospital to review this hospitalization.    3. Your Home Health care nurse will draw an INR for monitoring your INR on Friday 2/8/19 and Monday 2/11/19. You will be contacted by the [facility] patient monitoring for directions regarding your INR and coumadin dosing on Friday Feb. 8 through Monday Feb. 11.     Appointments on [place] and/or [facility] (with [facility] or [facility] provider or service). Call [phone] if you haven't heard regarding these appointments within 7 days of discharge.     Activity   Order Comments: See \"Discharge Instructions\" below.     Order Specific Question Answer Comments   Is discharge order? Yes      When to contact your care team   Order Comments: See \"Discharge Instructions\" below.     Wound care and dressings   Order Comments: See \"Discharge Instructions\" below.     Discharge Instructions   Order Comments: DIET  -Low Residue Diet for at least 4-6 weeks unless cleared by Colorectal surgery.  No raw vegetables, fruit skins, fibrous foods that require a lot of chewing, nuts, seeds, corn, popcorn.   -We recommend eating slowly, chewing thoroughly, eating small frequent meals throughout the day  -Stay well hydrated    ACTIVITY  -No lifting, pushing, pulling greater than 10 lbs and no strenuous exercise for 6 weeks   -No driving while on narcotic analgesics (i.e. Percocet, oxycodone, Vicodin)  -No driving until you are able to fully twist to both sides quickly and without any pain    WOUND CARE  -Inspect your wounds daily for signs of infection (increased redness, drainage, pain)  -Keep your wound clean and dry  -You may shower, but do not soak in tub or pool  -If you have staples, they can be removed in 7-10 days by your PCP or in Colorectal Clinic    NOTIFY  Please contact Colorectal Surgery nurses (Ye Alanis LPN, Marcela Bird RN or " Ximena Ford RN) at [phone] for problems after discharge such as:  -Temperature > 101F, chills, rigors, dizziness  -Redness around or purulent drainage from wound  -Inability to tolerate diet, nausea or vomiting  -You stop passing gas (or your stoma stops functioning), develop significant bloating, abdominal pain  -Have blood in stools/vomit  -Have severe diarrhea/constipation  -Any other questions or concerns.  - At nights (after 4:30pm), on weekends, or if urgent, call [phone] and ask the  to speak with the on-call Colorectal Surgery resident or fellow    Medication Instructions  Some of your medications may have changed. Please take only prescribed and resumed medications     Diet   Order Comments: Follow this diet upon discharge:      Low Fiber Diet     Order Specific Question Answer Comments   Is discharge order? Yes               Home Health Care:   Arranged           Discharge Disposition:   Discharged to home      Condition at discharge: Stable    Patient was seen and examined on day of discharge with CRS fellow, Dr. Narayanan, who discussed with staff on call, Dr. Marroquin.    Rafiq Dwyer MD  General Surgery, PGY-1  Pager [phone]    Staff Addendum:  Agree with the discharge summary as documented. I was personally involved with the discharge planning and hospital decision-making for this patient.  Chanelle Guzmán MD  Colon and Rectal Surgery Staff  [facility]

## 2019-02-08 ENCOUNTER — TELEPHONE (OUTPATIENT)
Dept: INTERNAL MEDICINE | Facility: CLINIC | Age: 84
End: 2019-02-08

## 2019-02-08 ENCOUNTER — ANTICOAGULATION THERAPY VISIT (OUTPATIENT)
Dept: ANTICOAGULATION | Facility: CLINIC | Age: 84
End: 2019-02-08

## 2019-02-08 ENCOUNTER — MEDICAL CORRESPONDENCE (OUTPATIENT)
Dept: HEALTH INFORMATION MANAGEMENT | Facility: CLINIC | Age: 84
End: 2019-02-08

## 2019-02-08 DIAGNOSIS — I48.20 CHRONIC ATRIAL FIBRILLATION (H): ICD-10-CM

## 2019-02-08 LAB
COPATH REPORT: NORMAL
INR PPP: 2.2

## 2019-02-08 NOTE — TELEPHONE ENCOUNTER
[facility] Call Center    Phone Message    May a detailed message be left on voicemail: yes    Reason for Call: Order(s): Home Care Orders: Other: Needs and okay for PT and OT assessments, and skilled nursing.     Action Taken: Other: PCC

## 2019-02-08 NOTE — PROGRESS NOTES
Dates of hospitalization:  to   Reason for hospitalization: colon cancer  Procedures performed: rt hemicolectomy  Vitamin K or FFP administered? no  INR Goal Range Confirmed to be:2-3  Inpatient warfarin doses added to calendar? yes  Medication changes at discharge: -  Warfarin dosing after DC: 5mg  Patient discharged on Lovenox? no  Next INR date:   Where is the patient discharging to? (home, TCU, staying locally, etc.): home  Will patient have home care? yes  ANTICOAGULATION FOLLOW-UP CLINIC VISIT    Patient Name:  Noe Florence  Date:  2019  Contact Type:  Telephone    SUBJECTIVE:     Patient Findings     Positives:   Change in diet/appetite (Pt is drinking a supplemental beverage that contains some Vit K.), OTC meds (Taking Tylenol 1000mg every 6 hours at this time)           OBJECTIVE    INR   Date Value Ref Range Status   2019 2.2  Final       ASSESSMENT / PLAN  INR assessment THER    Recheck INR In: 3 DAYS    INR Location Homecare INR      Anticoagulation Summary  As of 2019    INR goal:   2.0-3.0   TTR:   68.2 % (2.6 y)   INR used for dosin.2 (2019)   Warfarin maintenance plan:   5 mg (5 mg x 1) every day   Full warfarin instructions:   5 mg every day   Weekly warfarin total:   35 mg   Plan last modified:   Nguyen Zuniga RN (2017)   Next INR check:   2019   Priority:   INR   Target end date:   Indefinite    Indications    Atrial fibrillation (H) [I48.91] [I48.91]  Long-term (current) use of anticoagulants [Z79.01] [Z79.01]             Anticoagulation Episode Summary     INR check location:       Preferred lab:       Send INR reminders to:   [facility] CLIN[facility]    Comments:   Patient contact number: [phone]   Can leave results with Rica (friend)      Anticoagulation Care Providers     Provider Role Specialty Phone number    Deedee Baird MD Responsible Cardiology [phone]            See the Encounter Report to view Anticoagulation Flowsheet and  Dosing Calendar (Go to Encounters tab in chart review, and find the Anticoagulation Therapy Visit)  Spoke with [facility] nurse.    Leena Abebe RN

## 2019-02-11 ENCOUNTER — TELEPHONE (OUTPATIENT)
Dept: SURGERY | Facility: CLINIC | Age: 84
End: 2019-02-11

## 2019-02-11 ENCOUNTER — DOCUMENTATION ONLY (OUTPATIENT)
Dept: CARE COORDINATION | Facility: CLINIC | Age: 84
End: 2019-02-11

## 2019-02-11 ENCOUNTER — ANTICOAGULATION THERAPY VISIT (OUTPATIENT)
Dept: ANTICOAGULATION | Facility: CLINIC | Age: 84
End: 2019-02-11

## 2019-02-11 DIAGNOSIS — I48.20 CHRONIC ATRIAL FIBRILLATION (H): ICD-10-CM

## 2019-02-11 LAB — INR PPP: 3.87 (ref 0.86–1.14)

## 2019-02-11 PROCEDURE — 85610 PROTHROMBIN TIME: CPT | Performed by: INTERNAL MEDICINE

## 2019-02-11 NOTE — PROGRESS NOTES
2/11/19   [facility] nurse, Brady calling.  She saw Sha today and did a POC INR.  The machine read 6.8, so Brady carmelina a venous INR.  She will be dropping the specimen off at [place] lab about 3:40 PM today.  Sha has been taking tylenol 1,000 mg four times a day since being discharged from the hospital. Brady recommended he cut that back to three times daily.  Danya Edwards RN      2/11/19   4:47 PM:  Venous INR result is not available yet.  Spoke with Brady--Sha takes his warfarin at noon, so he has already taken it today.  He does not have any signs of bleeding.  Brady will review signs of bleeding with Sha and his spouse; and instruct them to be seen urgently if he develops any or if he falls.  Will follow up tomorrow with Brady after result is back.  Danya Edwards RN

## 2019-02-11 NOTE — PROGRESS NOTES
[facility] now requests orders and shares plan of care/discharge summaries for some patients through [employer].  Please REPLY TO THIS MESSAGE OR ROUTE BACK TO THE AUTHOR in order to give authorization for orders when needed.  This is considered a verbal order, you will still receive a faxed copy of orders for signature.  Thank you for your assistance in improving collaboration for our patients.    ORDER ok for OT to cont 2m1 for HEP endurance and DME needs.    Leena Mckeon OTR/L  [phone]  [email or web address]    Agree  Roberto Sarmiento

## 2019-02-11 NOTE — TELEPHONE ENCOUNTER
Verbal authorization given. Angie stated that she received verbal orders over the weekend from the on-call provider already as she needed to go out this morning.    Ximena Vasquez RN

## 2019-02-12 NOTE — PROGRESS NOTES
ANTICOAGULATION FOLLOW-UP CLINIC VISIT    Patient Name:  Noe Florence  Date:  2/12/2019  Contact Type:  Telephone    SUBJECTIVE:     Patient Findings     Comments:   POC INR was 6.8;  Venous INR is 3.87.  See addendum notes.  Sha has been taking tylenol 1,000 mg qid;  Brady recommended he not take more that 3,000 mg daily.  Home care sees Sha again 2/14/19 and will check his INR at that time.               OBJECTIVE    INR   Date Value Ref Range Status   02/11/2019 3.87 (H) 0.86 - 1.14 Final       ASSESSMENT / PLAN  INR assessment SUPRA    Recheck INR In: 3 DAYS    INR Location Homecare INR      Anticoagulation Summary  As of 2/11/2019    INR goal:   2.0-3.0   TTR:   68.1 % (2.6 y)   INR used for dosing:   3.87! (2/11/2019)   Warfarin maintenance plan:   5 mg (5 mg x 1) every day   Full warfarin instructions:   2/12: 2.5 mg; Otherwise 5 mg every day   Weekly warfarin total:   35 mg   Plan last modified:   Nguyen Zuniga RN (12/21/2017)   Next INR check:   2/14/2019   Priority:   INR   Target end date:   Indefinite    Indications    Atrial fibrillation (H) [I48.91] [I48.91]  Long-term (current) use of anticoagulants [Z79.01] [Z79.01]             Anticoagulation Episode Summary     INR check location:       Preferred lab:       Send INR reminders to:   [facility]    Comments:   Patient contact number: [phone]   Can leave results with Rica (friend)      Anticoagulation Care Providers     Provider Role Specialty Phone number    Deedee Baird MD [facility] Cardiology [phone]            See the Encounter Report to view Anticoagulation Flowsheet and Dosing Calendar (Go to Encounters tab in chart review, and find the Anticoagulation Therapy Visit)    2/12/19:  Spoke with Brady DYSON.  See addendum notes for notes from 2/11/19.  Venous INR on 2/11/19 was 3.87.  Sha had already taken warfarin dose on 2/11/19, so he will take a 2.5 mg dose 2/12/19.    Home care sees him again 2/14/19 and will  check INR at that time.  He has been taking tylenol qid, so this could be why INR is supra therapeutic.      Danya Edwards RN

## 2019-02-13 ENCOUNTER — DOCUMENTATION ONLY (OUTPATIENT)
Dept: CARE COORDINATION | Facility: CLINIC | Age: 84
End: 2019-02-13

## 2019-02-13 ENCOUNTER — PATIENT OUTREACH (OUTPATIENT)
Dept: SURGERY | Facility: CLINIC | Age: 84
End: 2019-02-13

## 2019-02-13 NOTE — PROGRESS NOTES
[facility] now requests orders and shares plan of care/discharge summaries for some patients through [employer].  Please REPLY TO THIS MESSAGE OR ROUTE BACK TO THE AUTHOR in order to give authorization for orders when needed.  This is considered a verbal order, you will still receive a faxed copy of orders for signature.  Thank you for your assistance in improving collaboration for our patients.    ORDER ok for Jaime montalvo for community resources and lifeline.    FYI pt had a fall 2/11/19. No injury reported.    Leena Mckeon OTR/L  [phone]  [email or web address]    Agree  Roberto Sarmiento

## 2019-02-14 ENCOUNTER — ANTICOAGULATION THERAPY VISIT (OUTPATIENT)
Dept: ANTICOAGULATION | Facility: CLINIC | Age: 84
End: 2019-02-14

## 2019-02-14 DIAGNOSIS — I48.20 CHRONIC ATRIAL FIBRILLATION (H): ICD-10-CM

## 2019-02-14 LAB — INR PPP: 3.7

## 2019-02-14 NOTE — PROGRESS NOTES
ANTICOAGULATION FOLLOW-UP CLINIC VISIT    Patient Name:  Noe Florence  Date:  2/14/2019  Contact Type:  Telephone    SUBJECTIVE:     Patient Findings     Comments:   Tylenol will take 1000U TID-down from QID.  Please call Brady DYSON on 2/18 when patient is in clinic [phone].           OBJECTIVE    INR   Date Value Ref Range Status   02/14/2019 3.7  Final       ASSESSMENT / PLAN  No question data found.  Anticoagulation Summary  As of 2/14/2019    INR goal:   2.0-3.0   TTR:   68.0 % (2.6 y)   INR used for dosing:   3.7! (2/14/2019)   Warfarin maintenance plan:   5 mg (5 mg x 1) every day   Full warfarin instructions:   2/14: 2.5 mg; 2/15: 2.5 mg; Otherwise 5 mg every day   Weekly warfarin total:   35 mg   Plan last modified:   Nguyen Zuniga RN (12/21/2017)   Next INR check:   2/18/2019   Priority:   INR   Target end date:   Indefinite    Indications    Atrial fibrillation (H) [I48.91] [I48.91]  Long-term (current) use of anticoagulants [Z79.01] [Z79.01]             Anticoagulation Episode Summary     INR check location:       Preferred lab:       Send INR reminders to:   [facility]    Comments:   Patient contact number: [phone]   Can leave results with Rica (friend)      Anticoagulation Care Providers     Provider Role Specialty Phone number    Deedee Baird MD Responsible Cardiology [phone]            See the Encounter Report to view Anticoagulation Flowsheet and Dosing Calendar (Go to Encounters tab in chart review, and find the Anticoagulation Therapy Visit)    Spoke with Geoffrey Manning, RN

## 2019-02-14 NOTE — PROGRESS NOTES
Nursing Post Op Note      Called to check on patient postoperatively after hospital discharge.       Patient is s/p Laparoscopic Converted to Open Transverse Colectomy with Lysis of Adhesions with Dr. Guzmán for Colon Cancer.      Pain is well controlled with tylenol, not requiring tramadol either.     Patient is eating and drinking normally. Encouraged patient to drink 8-10 glasses of water a day.     Patient is passing flats, is having increased diarrhea but has now become soft bowel movements.    Patient is voiding normally and urine is light in color.    Patient is set up with home care. Patient reports being contacted by the home care service. Patient has been seen by someone from home care.     Patient Denies nausea and vomiting.    Patient Denies any fevers or chills.    Patient's incision is clean dry and intact. Patient reminded NOT to remove any dressings over their incisions.     Follow up is set up with Cristal Ordoñez on February 18 at 2:15 pm.     Encouraged the patient to contact the clinic in the meantime with any fevers, chills, nausea, vomiting, dizziness, lightheadedness, uncontrolled pain, changes to the incisions, or with any questions or concerns.    Patient's questions were answered to their stated satisfaction and they are in agreement with this plan.    BYRON Bueno [phone]  Colon & Rectal Surgery Clinic  [facility]

## 2019-02-18 ENCOUNTER — OFFICE VISIT (OUTPATIENT)
Dept: SURGERY | Facility: CLINIC | Age: 84
End: 2019-02-18
Payer: COMMERCIAL

## 2019-02-18 ENCOUNTER — ANTICOAGULATION THERAPY VISIT (OUTPATIENT)
Dept: ANTICOAGULATION | Facility: CLINIC | Age: 84
End: 2019-02-18

## 2019-02-18 VITALS
HEIGHT: 67 IN | OXYGEN SATURATION: 98 % | WEIGHT: 144.8 LBS | BODY MASS INDEX: 22.73 KG/M2 | DIASTOLIC BLOOD PRESSURE: 63 MMHG | SYSTOLIC BLOOD PRESSURE: 109 MMHG

## 2019-02-18 DIAGNOSIS — Z79.01 ENCOUNTER FOR MONITORING WARFARIN THERAPY: ICD-10-CM

## 2019-02-18 DIAGNOSIS — Z09 FOLLOW-UP EXAMINATION FOLLOWING SURGERY: Primary | ICD-10-CM

## 2019-02-18 DIAGNOSIS — I48.20 CHRONIC ATRIAL FIBRILLATION (H): ICD-10-CM

## 2019-02-18 DIAGNOSIS — C18.4 MALIGNANT NEOPLASM OF TRANSVERSE COLON (H): ICD-10-CM

## 2019-02-18 DIAGNOSIS — Z51.81 ENCOUNTER FOR MONITORING WARFARIN THERAPY: ICD-10-CM

## 2019-02-18 LAB — INR PPP: 3.24 (ref 0.86–1.14)

## 2019-02-18 ASSESSMENT — MIFFLIN-ST. JEOR: SCORE: 1310.44

## 2019-02-18 ASSESSMENT — PAIN SCALES - GENERAL: PAINLEVEL: NO PAIN (0)

## 2019-02-18 NOTE — PROGRESS NOTES
ANTICOAGULATION FOLLOW-UP CLINIC VISIT    Patient Name:  Noe Florence  Date:  2/18/2019  Contact Type:  Telephone    SUBJECTIVE:        OBJECTIVE    INR   Date Value Ref Range Status   02/18/2019 3.24 (H) 0.86 - 1.14 Final       ASSESSMENT / PLAN  INR assessment SUPRA    Recheck INR In: 3 DAYS    INR Location Clinic      Anticoagulation Summary  As of 2/18/2019    INR goal:   2.0-3.0   TTR:   67.6 % (2.6 y)   INR used for dosing:   3.24! (2/18/2019)   Warfarin maintenance plan:   5 mg (5 mg x 1) every day   Full warfarin instructions:   2/18: 2.5 mg; 2/19: 2.5 mg; Otherwise 5 mg every day   Weekly warfarin total:   35 mg   Plan last modified:   Nguyen Zuniga RN (12/21/2017)   Next INR check:   2/21/2019   Priority:   INR   Target end date:   Indefinite    Indications    Atrial fibrillation (H) [I48.91] [I48.91]  Long-term (current) use of anticoagulants [Z79.01] [Z79.01]             Anticoagulation Episode Summary     INR check location:       Preferred lab:       Send INR reminders to:   [facility]    Comments:   Patient contact number: [phone]   Can leave results with Rica (friend)      Anticoagulation Care Providers     Provider Role Specialty Phone number    Deedee Baird MD [facility] Cardiology [phone]            See the Encounter Report to view Anticoagulation Flowsheet and Dosing Calendar (Go to Encounters tab in chart review, and find the Anticoagulation Therapy Visit)    Left message for patient with results and dosing recommendations. Asked patient to call back to report any missed doses, falls, signs and symptoms of bleeding or clotting, any changes in health, medication, or diet. Asked patient to call back with any questions or concerns.  Message was left on Rica's cell phone because Sha's voicemail was full.    Message left for Brady DYSON with INR result, warfarin recommendations and next INR date.    Danya Edwards RN     2/19/19 Addendum:  Rica called. They did  not receive message yesterday, so Sha took 5 mg of warfarin last night.  He will take 2.5 mg 2/19/19 and 2/20/19.  He does not have any signs of bleeding.  He is stopping tylenol.  No changes in health, diet, medications.  Will ask home care to recheck INR 2/21/19.  Danya Edwards RN

## 2019-02-18 NOTE — LETTER
2019       RE: Noe Florence  [address] [facility] [address]     Dear Colleague,    Thank you for referring your patient, Noe Florence, to the [facility] COLON AND RECTAL SURGERY at [facility]. Please see a copy of my visit note below.    Colon and Rectal Surgery Postoperative Clinic Note    RE: Noe Florence  : 1935  GALA: 2019    Noe Florence is a very pleasant 83 year old male with significant cardiac history with prior sigmoid colon cancer s/p sigmoid colectomy who developed a new cancer in the transverse colon and is now status post laparoscopic converted to open transverse colectomy, lysis of adhesions. His postoperative course was uneventful and he was discharged to home on 2019.     Interval history: Noe has been doing very well since returning home.  He denies any pain.  He is having normal, soft, formed bowel movements.  He is eating and drinking without difficulty and denies any nausea or vomiting.  He is tolerating a low residue diet.  He denies any fevers or chills.  He will be seeing his primary care provider tomorrow to discuss his warfarin.  He is scheduled with Dr. Gilliam in early April for follow-up as well.    Assessment/Plan:  83 year old male now status post laparoscopic converted to open transverse colectomy, lysis of adhesions.     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 Gilliam again in early April.  Continue on a low residue diet for another 2 weeks and then he can slowly add fiber back into his diet.  No lifting more than 10 pounds for full 6 weeks from surgery.  Incision sites are  healing well.  Discussed surveillance colonoscopy after 1 year and we will put him on a recall for this.  We will have him follow-up with Dr. Guzmán in the next 3-4 weeks as well.  Encouraged him to contact the clinic in the meantime with any questions or concerns.  Patient's and wife's questions were answered to their stated satisfaction and they are in agreement with this plan.    Medical history:  Past Medical History:   Diagnosis Date     Advanced open-angle glaucoma      Antiplatelet or antithrombotic long-term use      Atrial fibrillation (H)      CKD (chronic kidney disease), stage III (H) 2005     Colon cancer (H)     Stage II-B colon cancer     Coronary artery disease     s/p CABG x 2, JEREMY x 2     Diverticulitis      Hyperlipidemia      Hypertension      Ischemic cardiomyopathy      MGUS (monoclonal gammopathy of unknown significance)      Nonsenile cataract     BE     Pacemaker      Polymorphic ventricular tachycardia (H)      Polymyalgia rheumatica (H)      PVD (posterior vitreous detachment), both eyes 2005     S/P ablation of atrial flutter 6/20/14    CTI     Stented coronary artery      SVT (supraventricular tachycardia) (H)     PPM/AICD for NSVT     Upper leg DVT (deep venous thromboembolism), chronic (H)     Left     Weight loss, unintentional 2017    15 lb in 4 months       Surgical history:  Past Surgical History:   Procedure Laterality Date     C CABG, ARTERY-VEIN, FOUR  2006    LIMA-LAD, SVG-Rt PDA, SVG-OM2, SVG-Diag 1     C CABG, VEIN, SINGLE  1994    1-vessel CABG, SVG->PDRCA      CATARACT IOL, RT/LT Bilateral      COLONOSCOPY  3/13/2014    Procedure: COMBINED COLONOSCOPY, SINGLE BIOPSY/POLYPECTOMY BY BIOPSY;;  Surgeon: Mary Gerber MD;  Location:  GI     COLONOSCOPY N/A 6/22/2015    Procedure: COLONOSCOPY;  Surgeon: Marilin Newman MD;  Location:  GI     COLONOSCOPY N/A 11/7/2018    Procedure: COMBINED COLONOSCOPY, SINGLE OR MULTIPLE BIOPSY/POLYPECTOMY BY BIOPSY;   Surgeon: Roberto Esteban MD;  Location:  GI     EP ICD GENERATOR CHANGE DUAL N/A 12/11/2018    Procedure: EP ICD Generator Change Dual;  Surgeon: Deedee Baird MD;  Location:  HEART CARDIAC CATH LAB     H ABLATION ATRIAL FLUTTER       HEART CATH DRUG ELUTING STENT PLACEMENT  4/19/2012    JEREMY x 2 to LCx     IMPLANT IMPLANTABLE CARDIOVERTER DEFIBRILLATOR  5-    ppm/aicd     KNEE SURGERY      right and left knee surgeries     LAPAROSCOPIC ASSISTED COLECTOMY Right 2/1/2019    Procedure: Laparoscopic Converted to Open Transverse Colectomy with Lysis of Adhesions;  Surgeon: Chanelle Guzmán MD;  Location:  OR     LAPAROSCOPIC ASSISTED COLECTOMY LEFT (DESCENDING)  4/8/2014    Procedure: LAPAROSCOPIC ASSISTED COLECTOMY LEFT (DESCENDING);  Hand assisted Laparoscopic Sigmoid Colectomy , Laparoscopic mobilization of spleenic flexure *Latex Allergy*Anesthesia General with Block;  Surgeon: Chanelle Guzmán MD;  Location:  OR     REPAIR VALVE MITRAL  2006     30-mm Medtronic Julian ring      SELECTIVE LASER TRABECULOPLASTY (SLT) OD (RIGHT EYE)  4/10, 1/12,+1/9/13    RE     SELECTIVE LASER TRABECULOPLASTY (SLT) OS (LEFT EYE)  5/2012     SHOULDER SURGERY      right rotator cuff       Problem list:    Patient Active Problem List    Diagnosis Date Noted     Tinea pedis of both feet 02/25/2018     Priority: Medium     Venous stasis dermatitis of both lower extremities 02/25/2018     Priority: Medium     Actinic keratosis 02/25/2018     Priority: Medium     Inflamed seborrheic keratosis 02/25/2018     Priority: Medium     Skin infection 11/04/2017     Priority: Medium     CKD (chronic kidney disease), stage III (H)      Priority: Medium     Weight loss, unintentional      Priority: Medium     Ischemic cardiomyopathy 10/20/2016     Priority: Medium     Atrial fibrillation (H) [I48.91] 06/14/2016     Priority: Medium     Long-term (current) use of anticoagulants [Z79.01] 06/14/2016      Priority: Medium     Primary open angle glaucoma 01/11/2015     Priority: Medium     Problem list name updated by automated process. Provider to review       S/P ablation of atrial flutter 06/20/2014     Priority: Medium     Polymyalgia rheumatica (H)      Priority: Medium     Colon cancer (H) 04/08/2014     Priority: Medium     Tinea cruris 08/18/2013     Priority: Medium     Tinea corporis 08/18/2013     Priority: Medium     Skin exam, screening for cancer 08/08/2013     Priority: Medium     Automatic implantable cardioverter-defibrillator - Medtronic dual chamber ICD - Not Dependent 06/27/2012     Priority: Medium     Problem list name updated by automated process. Provider to review       NSVT (nonsustained ventricular tachycardia) (H) 04/20/2012     Priority: Medium     NSTEMI (non-ST elevated myocardial infarction) (H) 04/20/2012     Priority: Medium     CAD (coronary artery disease) 04/18/2012     Priority: Medium     Hypertension      Priority: Medium     Diverticulitis of colon 04/17/2012     Priority: Medium     Partially responded to abx but reflared. Extended course of 2-3 weeks of cipro/flagyl, see d/c summary        Hyperkalemia 04/16/2012     Priority: Medium     2/2 to ARSALAN and Ace-I, once Cr back to baseline resume lisinopril        Anemia 04/16/2012     Priority: Medium     Diarrhea 04/16/2012     Priority: Medium     Rotator cuff (capsule) sprain 03/18/2008     Priority: Medium     Other postprocedural status(V45.89) 03/18/2008     Priority: Medium       Medications:  Current Outpatient Medications   Medication Sig Dispense Refill     acetaminophen (TYLENOL) 500 MG tablet Take 2 tablets (1,000 mg) by mouth 4 times daily 240 tablet 0     aspirin 81 MG tablet Take 1 tablet by mouth daily.       atorvastatin (LIPITOR) 40 MG tablet Take 1 tablet (40 mg) by mouth daily as directed. 90 tablet 0     Cholecalciferol (VITAMIN D-3 PO) Take 1,000 Units by mouth 2 times daily        ciclopirox (LOPROX) 0.77 %  cream Apply topically 2 times daily To feet and toenails. (Patient taking differently: Apply topically 2 times daily as needed To feet and toenails.) 90 g 6     cyanocobalamin (VITAMIN  B-12) 1000 MCG tablet Take 2 tablets (2,000 mcg) by mouth daily (Patient taking differently: Take 1,000 mcg by mouth 2 times daily ) 180 tablet 3     doxazosin (CARDURA) 2 MG tablet Take 1/2 tab in the morning and 1/2 tab in the evening 90 tablet 3     ketoconazole (NIZORAL) 2 % cream Apply topically daily On hold (Patient taking differently: Apply topically daily as needed On hold) 60 g 3     loratadine (CLARITIN) 10 MG tablet Take 10 mg by mouth daily as needed        metoprolol tartrate (LOPRESSOR) 25 MG tablet Take 1 tablet (25 mg) by mouth 2 times daily 180 tablet 3     mirtazapine (REMERON) 15 MG tablet Take 15 mg by mouth At Bedtime.       Multiple Vitamins-Minerals (CENTRUM SILVER) per tablet Take 1 tablet by mouth daily. AM        order for DME 20-30 mm Hg knee length compression stockings.  Measure and fit.  Style and color per patient preference.  Doff n Aracelis per patient need. 1 Units 0     sertraline (ZOLOFT) 100 MG tablet Take 100 mg by mouth every evening.       warfarin (COUMADIN) 5 MG tablet Take 1 tablet by mouth daily or as directed by the Coumadin clinic. (Patient taking differently: As of January 18, 2019 - take 5 mg by mouth daily at lunch time) 90 tablet 3     Blood Pressure Monitor KIT 1 Units daily (Patient not taking: Reported on 12/3/2018) 1 kit 0     warfarin (COUMADIN) 7.5 MG tablet Take 1 tablet (7.5 mg) by mouth once for 1 dose 1 tablet 0       Allergies:  Allergies   Allergen Reactions     Latex Rash     Rash       Family history:  Family History   Problem Relation Age of Onset     C.A.D. Father      Anesthesia Reaction No family hx of      Crohn's Disease No family hx of      Ulcerative Colitis No family hx of      Cancer - colorectal No family hx of      Macular Degeneration No family hx of       "Cancer No family hx of         No known family hx of skin cancer     Melanoma No family hx of      Skin Cancer No family hx of        Social history:  Social History     Tobacco Use     Smoking status: Former Smoker     Types: Cigarettes, Cigars     Last attempt to quit: 1968     Years since quittin.1     Smokeless tobacco: Never Used   Substance Use Topics     Alcohol use: Yes     Alcohol/week: 0.0 oz     Comment: 2-3 drinks twice weekly     Marital status: .    Nursing Notes:   Baldev Mcmahon EMT  2019  2:35 PM  Signed  Chief Complaint   Patient presents with     Surgical Followup     Post op       Vitals:    19 1429   BP: 109/63   BP Location: Left arm   Patient Position: Sitting   Cuff Size: Adult Regular   SpO2: 98%   Weight: 144 lb 12.8 oz   Height: 5' 7\"       Body mass index is 22.68 kg/m .    SURYA Barron    Physical Examination:   /63 (BP Location: Left arm, Patient Position: Sitting, Cuff Size: Adult Regular)   Ht 5' 7\"   Wt 144 lb 12.8 oz   SpO2 98%   BMI 22.68 kg/m     General: Alert, oriented, in no acute distress, sitting comfortably  HEENT: Mucous membranes moist  Abdomen: Soft, nondistended, nontender on palpation.  Trocar sites well approximated with surgical glue in place and no erythema or drainage.    Total face to face time was 20 minutes, >50% counseling.  This is a postop visit.    MARIA LUISA Serra, NP-C  Colon and Rectal Surgery  [facility]    This note was created using speech recognition software and may contain unintended word substitutions.        "

## 2019-02-18 NOTE — NURSING NOTE
"Chief Complaint   Patient presents with     Surgical Followup     Post op       Vitals:    02/18/19 1429   BP: 109/63   BP Location: Left arm   Patient Position: Sitting   Cuff Size: Adult Regular   SpO2: 98%   Weight: 144 lb 12.8 oz   Height: 5' 7\"       Body mass index is 22.68 kg/m .      Baldev Mcmahon, EMT                      "

## 2019-02-18 NOTE — PROGRESS NOTES
Colon and Rectal Surgery Postoperative Clinic Note    RE: Noe Florence  : 1935  GALA: 2019    Noe Florence is a very pleasant 83 year old male with significant cardiac history with prior sigmoid colon cancer s/p sigmoid colectomy who developed a new cancer in the transverse colon and is now status post laparoscopic converted to open transverse colectomy, lysis of adhesions. His postoperative course was uneventful and he was discharged to home on 2019.     Interval history: Noe has been doing very well since returning home.  He denies any pain.  He is having normal, soft, formed bowel movements.  He is eating and drinking without difficulty and denies any nausea or vomiting.  He is tolerating a low residue diet.  He denies any fevers or chills.  He will be seeing his primary care provider tomorrow to discuss his warfarin.  He is scheduled with Dr. Gilliam in early April for follow-up as well.    Assessment/Plan:  83 year old male now status post laparoscopic converted to open transverse colectomy, lysis of adhesions.     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 Gilliam again in early April.  Continue on a low residue diet for another 2 weeks and then he can slowly add fiber back into his diet.  No lifting more than 10 pounds for full 6 weeks from surgery.  Incision sites are healing well.  Discussed surveillance colonoscopy after 1 year and we will put him on a recall for this.  We will have him follow-up with Dr. Guzmán in the next 3-4 weeks as well.  Encouraged him to contact the clinic in the meantime with any questions or concerns.  Patient's and wife's  questions were answered to their stated satisfaction and they are in agreement with this plan.    Medical history:  Past Medical History:   Diagnosis Date     Advanced open-angle glaucoma      Antiplatelet or antithrombotic long-term use      Atrial fibrillation (H)      CKD (chronic kidney disease), stage III (H) 2005     Colon cancer (H)     Stage II-B colon cancer     Coronary artery disease     s/p CABG x 2, JEREMY x 2     Diverticulitis      Hyperlipidemia      Hypertension      Ischemic cardiomyopathy      MGUS (monoclonal gammopathy of unknown significance)      Nonsenile cataract     BE     Pacemaker      Polymorphic ventricular tachycardia (H)      Polymyalgia rheumatica (H)      PVD (posterior vitreous detachment), both eyes 2005     S/P ablation of atrial flutter 6/20/14    CTI     Stented coronary artery      SVT (supraventricular tachycardia) (H)     PPM/AICD for NSVT     Upper leg DVT (deep venous thromboembolism), chronic (H)     Left     Weight loss, unintentional 2017    15 lb in 4 months       Surgical history:  Past Surgical History:   Procedure Laterality Date     C CABG, ARTERY-VEIN, FOUR  2006    LIMA-LAD, SVG-Rt PDA, SVG-OM2, SVG-Diag 1     C CABG, VEIN, SINGLE  1994    1-vessel CABG, SVG->PDRCA      CATARACT IOL, RT/LT Bilateral      COLONOSCOPY  3/13/2014    Procedure: COMBINED COLONOSCOPY, SINGLE BIOPSY/POLYPECTOMY BY BIOPSY;;  Surgeon: Mary Gerber MD;  Location:  GI     COLONOSCOPY N/A 6/22/2015    Procedure: COLONOSCOPY;  Surgeon: Marilin Newman MD;  Location:  GI     COLONOSCOPY N/A 11/7/2018    Procedure: COMBINED COLONOSCOPY, SINGLE OR MULTIPLE BIOPSY/POLYPECTOMY BY BIOPSY;  Surgeon: Roberto Esteban MD;  Location:  GI     EP ICD GENERATOR CHANGE DUAL N/A 12/11/2018    Procedure: EP ICD Generator Change Dual;  Surgeon: Deedee Baird MD;  Location:  HEART CARDIAC CATH LAB     H ABLATION ATRIAL FLUTTER       HEART CATH DRUG ELUTING STENT PLACEMENT   4/19/2012    JEREMY x 2 to LCx     IMPLANT IMPLANTABLE CARDIOVERTER DEFIBRILLATOR  5-    ppm/aicd     KNEE SURGERY      right and left knee surgeries     LAPAROSCOPIC ASSISTED COLECTOMY Right 2/1/2019    Procedure: Laparoscopic Converted to Open Transverse Colectomy with Lysis of Adhesions;  Surgeon: Chanelle Guzmán MD;  Location: [facility] OR     LAPAROSCOPIC ASSISTED COLECTOMY LEFT (DESCENDING)  4/8/2014    Procedure: LAPAROSCOPIC ASSISTED COLECTOMY LEFT (DESCENDING);  Hand assisted Laparoscopic Sigmoid Colectomy , Laparoscopic mobilization of spleenic flexure *Latex Allergy*Anesthesia General with Block;  Surgeon: Chanelle Guzmán MD;  Location: [facility] OR     REPAIR VALVE MITRAL  2006     30-mm Medtronic Julian ring      SELECTIVE LASER TRABECULOPLASTY (SLT) OD (RIGHT EYE)  4/10, 1/12,+1/9/13    RE     SELECTIVE LASER TRABECULOPLASTY (SLT) OS (LEFT EYE)  5/2012     SHOULDER SURGERY      right rotator cuff       Problem list:    Patient Active Problem List    Diagnosis Date Noted     Tinea pedis of both feet 02/25/2018     Priority: Medium     Venous stasis dermatitis of both lower extremities 02/25/2018     Priority: Medium     Actinic keratosis 02/25/2018     Priority: Medium     Inflamed seborrheic keratosis 02/25/2018     Priority: Medium     Skin infection 11/04/2017     Priority: Medium     CKD (chronic kidney disease), stage III (H)      Priority: Medium     Weight loss, unintentional      Priority: Medium     Ischemic cardiomyopathy 10/20/2016     Priority: Medium     Atrial fibrillation (H) [I48.91] 06/14/2016     Priority: Medium     Long-term (current) use of anticoagulants [Z79.01] 06/14/2016     Priority: Medium     Primary open angle glaucoma 01/11/2015     Priority: Medium     Problem list name updated by automated process. Provider to review       S/P ablation of atrial flutter 06/20/2014     Priority: Medium     Polymyalgia rheumatica (H)      Priority: Medium     Colon cancer (H)  04/08/2014     Priority: Medium     Tinea cruris 08/18/2013     Priority: Medium     Tinea corporis 08/18/2013     Priority: Medium     Skin exam, screening for cancer 08/08/2013     Priority: Medium     Automatic implantable cardioverter-defibrillator - Medtronic dual chamber ICD - Not Dependent 06/27/2012     Priority: Medium     Problem list name updated by automated process. Provider to review       NSVT (nonsustained ventricular tachycardia) (H) 04/20/2012     Priority: Medium     NSTEMI (non-ST elevated myocardial infarction) (H) 04/20/2012     Priority: Medium     CAD (coronary artery disease) 04/18/2012     Priority: Medium     Hypertension      Priority: Medium     Diverticulitis of colon 04/17/2012     Priority: Medium     Partially responded to abx but reflared. Extended course of 2-3 weeks of cipro/flagyl, see d/c summary        Hyperkalemia 04/16/2012     Priority: Medium     2/2 to ARSALAN and Ace-I, once Cr back to baseline resume lisinopril        Anemia 04/16/2012     Priority: Medium     Diarrhea 04/16/2012     Priority: Medium     Rotator cuff (capsule) sprain 03/18/2008     Priority: Medium     Other postprocedural status(V45.89) 03/18/2008     Priority: Medium       Medications:  Current Outpatient Medications   Medication Sig Dispense Refill     acetaminophen (TYLENOL) 500 MG tablet Take 2 tablets (1,000 mg) by mouth 4 times daily 240 tablet 0     aspirin 81 MG tablet Take 1 tablet by mouth daily.       atorvastatin (LIPITOR) 40 MG tablet Take 1 tablet (40 mg) by mouth daily as directed. 90 tablet 0     Cholecalciferol (VITAMIN D-3 PO) Take 1,000 Units by mouth 2 times daily        ciclopirox (LOPROX) 0.77 % cream Apply topically 2 times daily To feet and toenails. (Patient taking differently: Apply topically 2 times daily as needed To feet and toenails.) 90 g 6     cyanocobalamin (VITAMIN  B-12) 1000 MCG tablet Take 2 tablets (2,000 mcg) by mouth daily (Patient taking differently: Take 1,000 mcg  by mouth 2 times daily ) 180 tablet 3     doxazosin (CARDURA) 2 MG tablet Take 1/2 tab in the morning and 1/2 tab in the evening 90 tablet 3     ketoconazole (NIZORAL) 2 % cream Apply topically daily On hold (Patient taking differently: Apply topically daily as needed On hold) 60 g 3     loratadine (CLARITIN) 10 MG tablet Take 10 mg by mouth daily as needed        metoprolol tartrate (LOPRESSOR) 25 MG tablet Take 1 tablet (25 mg) by mouth 2 times daily 180 tablet 3     mirtazapine (REMERON) 15 MG tablet Take 15 mg by mouth At Bedtime.       Multiple Vitamins-Minerals (CENTRUM SILVER) per tablet Take 1 tablet by mouth daily. AM        order for DME 20-30 mm Hg knee length compression stockings.  Measure and fit.  Style and color per patient preference.  Doff n Aracelis per patient need. 1 Units 0     sertraline (ZOLOFT) 100 MG tablet Take 100 mg by mouth every evening.       warfarin (COUMADIN) 5 MG tablet Take 1 tablet by mouth daily or as directed by the Coumadin clinic. (Patient taking differently: As of January 18, 2019 - take 5 mg by mouth daily at lunch time) 90 tablet 3     Blood Pressure Monitor KIT 1 Units daily (Patient not taking: Reported on 12/3/2018) 1 kit 0     warfarin (COUMADIN) 7.5 MG tablet Take 1 tablet (7.5 mg) by mouth once for 1 dose 1 tablet 0       Allergies:  Allergies   Allergen Reactions     Latex Rash     Rash       Family history:  Family History   Problem Relation Age of Onset     C.A.D. Father      Anesthesia Reaction No family hx of      Crohn's Disease No family hx of      Ulcerative Colitis No family hx of      Cancer - colorectal No family hx of      Macular Degeneration No family hx of      Cancer No family hx of         No known family hx of skin cancer     Melanoma No family hx of      Skin Cancer No family hx of        Social history:  Social History     Tobacco Use     Smoking status: Former Smoker     Types: Cigarettes, Cigars     Last attempt to quit: 1/1/1968     Years since  "quittin.1     Smokeless tobacco: Never Used   Substance Use Topics     Alcohol use: Yes     Alcohol/week: 0.0 oz     Comment: 2-3 drinks twice weekly     Marital status: .    Nursing Notes:   Baldev Mcmahon EMT  2019  2:35 PM  Signed  Chief Complaint   Patient presents with     Surgical Followup     Post op       Vitals:    19 1429   BP: 109/63   BP Location: Left arm   Patient Position: Sitting   Cuff Size: Adult Regular   SpO2: 98%   Weight: 144 lb 12.8 oz   Height: 5' 7\"       Body mass index is 22.68 kg/m .      SURYA Barron                         Physical Examination:   /63 (BP Location: Left arm, Patient Position: Sitting, Cuff Size: Adult Regular)   Ht 5' 7\"   Wt 144 lb 12.8 oz   SpO2 98%   BMI 22.68 kg/m    General: Alert, oriented, in no acute distress, sitting comfortably  HEENT: Mucous membranes moist  Abdomen: Soft, nondistended, nontender on palpation.  Trocar sites well approximated with surgical glue in place and no erythema or drainage.    Total face to face time was 20 minutes, >50% counseling.  This is a postop visit.    MARIA LUISA Serra, NP-C  Colon and Rectal Surgery  [facility]    This note was created using speech recognition software and may contain unintended word substitutions.      "

## 2019-02-19 ENCOUNTER — OFFICE VISIT (OUTPATIENT)
Dept: INTERNAL MEDICINE | Facility: CLINIC | Age: 84
End: 2019-02-19
Payer: COMMERCIAL

## 2019-02-19 ENCOUNTER — TELEPHONE (OUTPATIENT)
Dept: INTERNAL MEDICINE | Facility: CLINIC | Age: 84
End: 2019-02-19

## 2019-02-19 ENCOUNTER — MEDICAL CORRESPONDENCE (OUTPATIENT)
Dept: HEALTH INFORMATION MANAGEMENT | Facility: CLINIC | Age: 84
End: 2019-02-19

## 2019-02-19 VITALS
DIASTOLIC BLOOD PRESSURE: 75 MMHG | OXYGEN SATURATION: 99 % | BODY MASS INDEX: 22.49 KG/M2 | SYSTOLIC BLOOD PRESSURE: 115 MMHG | HEART RATE: 105 BPM | RESPIRATION RATE: 20 BRPM | WEIGHT: 143.6 LBS

## 2019-02-19 DIAGNOSIS — D64.9 ANEMIA, UNSPECIFIED TYPE: ICD-10-CM

## 2019-02-19 DIAGNOSIS — D64.9 ANEMIA, UNSPECIFIED TYPE: Primary | ICD-10-CM

## 2019-02-19 LAB
ERYTHROCYTE [DISTWIDTH] IN BLOOD BY AUTOMATED COUNT: 17.2 % (ref 10–15)
HCT VFR BLD AUTO: 28 % (ref 40–53)
HGB BLD-MCNC: 8.7 G/DL (ref 13.3–17.7)
MCH RBC QN AUTO: 28.4 PG (ref 26.5–33)
MCHC RBC AUTO-ENTMCNC: 31.1 G/DL (ref 31.5–36.5)
MCV RBC AUTO: 92 FL (ref 78–100)
PLATELET # BLD AUTO: 348 10E9/L (ref 150–450)
RBC # BLD AUTO: 3.06 10E12/L (ref 4.4–5.9)
WBC # BLD AUTO: 7.5 10E9/L (ref 4–11)

## 2019-02-19 ASSESSMENT — PAIN SCALES - GENERAL: PAINLEVEL: NO PAIN (0)

## 2019-02-19 NOTE — TELEPHONE ENCOUNTER
Verbal orders given to Brady from [facility], per Dr. Sarmiento, for Order(s): Home Care Orders: Physical Therapy (PT): 1 time to eval and treat for strength gait Training home christian montalvo. Saray Middleton LPN 2/19/2019 1:49 PM

## 2019-02-19 NOTE — TELEPHONE ENCOUNTER
[facility] Call Center    Phone Message    May a detailed message be left on voicemail: yes    Reason for Call: Order(s): Home Care Orders: Physical Therapy (PT): 1 time to eval and treat for strength gait Training home safcasper montalvo please call Brady to provide verbal orders    Action Taken: Message routed to:  [facility] pcc     Verbal order given and documented. Saray Middleton LPN 2/19/2019 1:49 PM

## 2019-02-19 NOTE — PROGRESS NOTES
HPI  83-year-old returns today with his wife for follow-up evaluation following his recent transverse colectomy.  He has been doing well he is not participating in formal physical therapy but he is starting to walk.  They have been walking in the clinic here doing a couple of laps on the first floor.  He is tolerating this well without chest pain dyspnea or other complaints.  He is otherwise not had any dizziness or lightheadedness he is sleeping on hours going to bed around 3 in the morning sleeping through most the morning eating healthy 2 meals a day overall his weight is down he is lost about 20 pounds over the last couple years we discussed improving his nutrition increasing his protein intake and portion of his protein through the day.  Has not noted any bloody stools or black stools at all.  Past Medical History:   Diagnosis Date     Advanced open-angle glaucoma      Antiplatelet or antithrombotic long-term use      Atrial fibrillation (H)      CKD (chronic kidney disease), stage III (H) 2005     Colon cancer (H)     Stage II-B colon cancer     Coronary artery disease     s/p CABG x 2, JEREMY x 2     Diverticulitis      Hyperlipidemia      Hypertension      Ischemic cardiomyopathy      MGUS (monoclonal gammopathy of unknown significance)      Nonsenile cataract     BE     Pacemaker      Polymorphic ventricular tachycardia (H)      Polymyalgia rheumatica (H)      PVD (posterior vitreous detachment), both eyes 2005     S/P ablation of atrial flutter 6/20/14    CTI     Stented coronary artery      SVT (supraventricular tachycardia) (H)     PPM/AICD for NSVT     Upper leg DVT (deep venous thromboembolism), chronic (H)     Left     Weight loss, unintentional 2017    15 lb in 4 months     Past Surgical History:   Procedure Laterality Date     C CABG, ARTERY-VEIN, FOUR  2006    LIMA-LAD, SVG-Rt PDA, SVG-OM2, SVG-Diag 1     C CABG, VEIN, SINGLE  1994    1-vessel CABG, SVG->PDRCA      CATARACT IOL, RT/LT Bilateral       COLONOSCOPY  3/13/2014    Procedure: COMBINED COLONOSCOPY, SINGLE BIOPSY/POLYPECTOMY BY BIOPSY;;  Surgeon: Mary Gerber MD;  Location: [facility] GI     COLONOSCOPY N/A 6/22/2015    Procedure: COLONOSCOPY;  Surgeon: Marilin Newman MD;  Location: [facility] GI     COLONOSCOPY N/A 11/7/2018    Procedure: COMBINED COLONOSCOPY, SINGLE OR MULTIPLE BIOPSY/POLYPECTOMY BY BIOPSY;  Surgeon: Roberto Esteban MD;  Location: [facility] GI     EP ICD GENERATOR CHANGE DUAL N/A 12/11/2018    Procedure: EP ICD Generator Change Dual;  Surgeon: Deedee Baird MD;  Location:  HEART CARDIAC CATH LAB     H ABLATION ATRIAL FLUTTER       HEART CATH DRUG ELUTING STENT PLACEMENT  4/19/2012    JEREMY x 2 to LCx     IMPLANT IMPLANTABLE CARDIOVERTER DEFIBRILLATOR  5-    ppm/aicd     KNEE SURGERY      right and left knee surgeries     LAPAROSCOPIC ASSISTED COLECTOMY Right 2/1/2019    Procedure: Laparoscopic Converted to Open Transverse Colectomy with Lysis of Adhesions;  Surgeon: Chanelle Guzmán MD;  Location:  OR     LAPAROSCOPIC ASSISTED COLECTOMY LEFT (DESCENDING)  4/8/2014    Procedure: LAPAROSCOPIC ASSISTED COLECTOMY LEFT (DESCENDING);  Hand assisted Laparoscopic Sigmoid Colectomy , Laparoscopic mobilization of spleenic flexure *Latex Allergy*Anesthesia General with Block;  Surgeon: Chanelle Guzmán MD;  Location:  OR     REPAIR VALVE MITRAL  2006     30-mm Medtronic Julian ring      SELECTIVE LASER TRABECULOPLASTY (SLT) OD (RIGHT EYE)  4/10, 1/12,+1/9/13    RE     SELECTIVE LASER TRABECULOPLASTY (SLT) OS (LEFT EYE)  5/2012     SHOULDER SURGERY      right rotator cuff     Family History   Problem Relation Age of Onset     C.A.D. Father      Anesthesia Reaction No family hx of      Crohn's Disease No family hx of      Ulcerative Colitis No family hx of      Cancer - colorectal No family hx of      Macular Degeneration No family hx of      Cancer No family hx of         No known family hx of skin cancer      Melanoma No family hx of      Skin Cancer No family hx of      Social History     Socioeconomic History     Marital status:      Spouse name: None     Number of children: None     Years of education: None     Highest education level: None   Social Needs     Financial resource strain: None     Food insecurity - worry: None     Food insecurity - inability: None     Transportation needs - medical: None     Transportation needs - non-medical: None   Occupational History     None   Tobacco Use     Smoking status: Former Smoker     Types: Cigarettes, Cigars     Last attempt to quit: 1968     Years since quittin.1     Smokeless tobacco: Never Used   Substance and Sexual Activity     Alcohol use: Yes     Alcohol/week: 0.0 oz     Comment: 2-3 drinks twice weekly     Drug use: No     Sexual activity: None   Other Topics Concern     Parent/sibling w/ CABG, MI or angioplasty before 65F 55M? Not Asked   Social History Narrative     None       Exam:  /75 (BP Location: Right arm, Patient Position: Sitting, Cuff Size: Adult Regular)   Pulse 105   Resp 20   Wt 65.1 kg (143 lb 9.6 oz)   SpO2 99%   BMI 22.49 kg/m    143 lbs 9.6 oz  The patient is alert, oriented with a clear sensorium.   Skin shows no lesions or rashes and good turgor.   Head is normocephalic and atraumatic.    Neck shows no nodes, thyromegaly.     Lungs are clear.   Heart shows normal S1 and S2 without murmur or gallop.    Extremities show no edema.    ASSESSMENT  1 status post transverse colectomy for colon cancer  2 hypertension well controlled  3 hyperlipidemia  4 diverticulosis  5 coronary artery disease asymptomatic  6 history of atrial fibrillation status post ablation    Plan  Recommended that he go to annual colonoscopies because of the recent onset of the second tumor.  We will set this up in November or December.  Discuss his diet and exercise regimen and have him follow-up in November then have him keep his other appointments as  scheduled have him follow-up sooner immediately if any increased symptoms or problems  Over 25 minutes spent with patient the majority in counseling and coordinating care.  This note was completed using Dragon voice recognition software.  Although reviewed after completion, some word and grammatical errors may occur.    Roberto Sarmiento MD  General Internal Medicine  [facility]  [phone]

## 2019-02-19 NOTE — NURSING NOTE
Chief Complaint   Patient presents with     Surgical Followup     follow up total colectomy on 2/1/19   Mariam Ford LPN 7:53 AM on 2/19/2019

## 2019-02-20 ENCOUNTER — MEDICAL CORRESPONDENCE (OUTPATIENT)
Dept: HEALTH INFORMATION MANAGEMENT | Facility: CLINIC | Age: 84
End: 2019-02-20

## 2019-02-21 ENCOUNTER — ANTICOAGULATION THERAPY VISIT (OUTPATIENT)
Dept: ANTICOAGULATION | Facility: CLINIC | Age: 84
End: 2019-02-21

## 2019-02-21 DIAGNOSIS — I48.20 CHRONIC ATRIAL FIBRILLATION (H): ICD-10-CM

## 2019-02-21 LAB — INR PPP: 3.1

## 2019-02-21 NOTE — PROGRESS NOTES
ANTICOAGULATION FOLLOW-UP CLINIC VISIT    Patient Name:  Noe Florence  Date:  2/21/2019  Contact Type:  Telephone    SUBJECTIVE:     Patient Findings     Comments:   INR trending down.  Patient continues to take less Tylenol.            OBJECTIVE    INR   Date Value Ref Range Status   02/21/2019 3.1  Final       ASSESSMENT / PLAN  No question data found.  Anticoagulation Summary  As of 2/21/2019    INR goal:   2.0-3.0   TTR:   67.5 % (2.6 y)   INR used for dosing:   3.1! (2/21/2019)   Warfarin maintenance plan:   No maintenance plan   Full warfarin instructions:   2/21: 2.5 mg; 2/22: 5 mg; 2/23: 2.5 mg; 2/24: 2.5 mg; 2/25: 5 mg; 2/26: 2.5 mg; 2/27: 5 mg   Plan last modified:   Roya Manning RN (2/21/2019)   Next INR check:   2/28/2019   Priority:   INR   Target end date:   Indefinite    Indications    Atrial fibrillation (H) [I48.91] [I48.91]  Long-term (current) use of anticoagulants [Z79.01] [Z79.01]             Anticoagulation Episode Summary     INR check location:       Preferred lab:       Send INR reminders to:   [facility]    Comments:   Patient contact number: [phone]   Can leave results with Rica (friend)      Anticoagulation Care Providers     Provider Role Specialty Phone number    Deedee Baird MD Responsible Cardiology [phone]            See the Encounter Report to view Anticoagulation Flowsheet and Dosing Calendar (Go to Encounters tab in chart review, and find the Anticoagulation Therapy Visit)    Spoke with Brady DYSON.     Roya Manning, RN

## 2019-02-22 ENCOUNTER — TELEPHONE (OUTPATIENT)
Dept: INTERNAL MEDICINE | Facility: CLINIC | Age: 84
End: 2019-02-22

## 2019-02-22 NOTE — TELEPHONE ENCOUNTER
[facility] Call Center    Phone Message    May a detailed message be left on voicemail: yes    Reason for Call: Other: .  pt fall reported. Band exercise, band broke, pt fell on his back 2/22/19 - no injury. Head not hit.  Able to get pt back up OK.      Action Taken: Message routed to:  [facility] [place]

## 2019-02-25 ENCOUNTER — DOCUMENTATION ONLY (OUTPATIENT)
Dept: INTERNAL MEDICINE | Facility: CLINIC | Age: 84
End: 2019-02-25

## 2019-02-25 NOTE — PROGRESS NOTES
[facility] now requests orders and shares plan of care/discharge summaries for some patients through [facility].  Please REPLY TO THIS MESSAGE OR ROUTE BACK TO THE AUTHOR in order to give authorization for orders when needed.  This is considered a verbal order, you will still receive a faxed copy of orders for signature.  Thank you for your assistance in improving collaboration for our patients.    ORDER ok for OT to cont 1wk1 for shower safety and HEP.    Leena MURPHY/L  [phone]  [email or web address]      Agree  Roberto Sarmiento

## 2019-02-27 ENCOUNTER — TELEPHONE (OUTPATIENT)
Dept: INTERNAL MEDICINE | Facility: CLINIC | Age: 84
End: 2019-02-27

## 2019-02-27 NOTE — TELEPHONE ENCOUNTER
[facility] Call Center    Phone Message    May a detailed message be left on voicemail: yes, Leena with [facility] is wanting a Verbal Okay, Leena can be reached at [phone].    Reason for Call: Order(s): Home Care Orders: Occupational Therapy (OT): Leena with [facility] Put in orders through Epic on 2/25/2019 and is needing a verbal, Leena is wanting to see Pt 2/28/2019. ORDER ok for OT to cont 1wk1 for shower safety and HEP.    Action Taken: Message routed to:  [facility] ([facility] [place]

## 2019-02-28 ENCOUNTER — ANTICOAGULATION THERAPY VISIT (OUTPATIENT)
Dept: ANTICOAGULATION | Facility: CLINIC | Age: 84
End: 2019-02-28

## 2019-02-28 DIAGNOSIS — I48.20 CHRONIC ATRIAL FIBRILLATION (H): ICD-10-CM

## 2019-02-28 LAB — INR PPP: 3

## 2019-02-28 NOTE — PROGRESS NOTES
ANTICOAGULATION FOLLOW-UP CLINIC VISIT    Patient Name:  Noe Florence  Date:  2/28/2019  Contact Type:  Telephone    SUBJECTIVE:        OBJECTIVE    INR   Date Value Ref Range Status   02/28/2019 3.0  Final       ASSESSMENT / PLAN  No question data found.  Anticoagulation Summary  As of 2/28/2019    INR goal:   2.0-3.0   TTR:   67.0 % (2.6 y)   INR used for dosing:   3.0 (2/28/2019)   Warfarin maintenance plan:   5 mg (5 mg x 1) every Mon, Wed, Fri; 2.5 mg (5 mg x 0.5) all other days   Full warfarin instructions:   5 mg every Mon, Wed, Fri; 2.5 mg all other days   Weekly warfarin total:   25 mg   Plan last modified:   Roya Manning RN (2/28/2019)   Next INR check:   3/7/2019   Priority:   INR   Target end date:   Indefinite    Indications    Atrial fibrillation (H) [I48.91] [I48.91]  Long-term (current) use of anticoagulants [Z79.01] [Z79.01]             Anticoagulation Episode Summary     INR check location:       Preferred lab:       Send INR reminders to:   [facility]    Comments:   Patient contact number: [phone]   Can leave results with Rica (friend)      Anticoagulation Care Providers     Provider Role Specialty Phone number    Deedee Baird MD [facility] Cardiology [phone]            See the Encounter Report to view Anticoagulation Flowsheet and Dosing Calendar (Go to Encounters tab in chart review, and find the Anticoagulation Therapy Visit)    Spoke with Brady DYSON.     Roya Manning, RN

## 2019-03-05 ENCOUNTER — OFFICE VISIT (OUTPATIENT)
Dept: NEUROLOGY | Facility: CLINIC | Age: 84
End: 2019-03-05
Payer: COMMERCIAL

## 2019-03-05 ENCOUNTER — MEDICAL CORRESPONDENCE (OUTPATIENT)
Dept: HEALTH INFORMATION MANAGEMENT | Facility: CLINIC | Age: 84
End: 2019-03-05

## 2019-03-05 VITALS
HEART RATE: 100 BPM | SYSTOLIC BLOOD PRESSURE: 108 MMHG | DIASTOLIC BLOOD PRESSURE: 63 MMHG | BODY MASS INDEX: 22.4 KG/M2 | WEIGHT: 143 LBS

## 2019-03-05 DIAGNOSIS — M21.372 LEFT FOOT DROP: ICD-10-CM

## 2019-03-05 DIAGNOSIS — I10 ESSENTIAL HYPERTENSION: ICD-10-CM

## 2019-03-05 DIAGNOSIS — G62.9 NEUROPATHY: Primary | ICD-10-CM

## 2019-03-05 RX ORDER — DOXAZOSIN 2 MG/1
TABLET ORAL
Qty: 90 TABLET | Refills: 3 | Status: ON HOLD | OUTPATIENT
Start: 2019-03-05 | End: 2019-03-12

## 2019-03-05 ASSESSMENT — PAIN SCALES - GENERAL: PAINLEVEL: NO PAIN (0)

## 2019-03-05 NOTE — PROGRESS NOTES
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 He has a left foot drop for many years. He is not sure when this started. 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 AlI initially saw him in March 2018 for balance difficulty.     Interval history:  I last saw him 7/3/2018. Since then he had surgery for colon cancer. I understand the surgery went well. He had no chemotherapy. Since his last visit he reports that his left foot is about the same. Weakness persists, but no worse. He still wears a support device on his foot, which he finds helpful. He has tried a more rigid foot brace, but does not wear it due to comfort. His balance is stable. No recent falls. He uses a cane during ambulation. He denies numbness or pain in his feet or hands. No low back pain.  He is doing PT at home and tries to exercises regularly.     Prior pertinent laboratory work-up:  3/18: MAG negative. Urine IF showed no monoclonal protein.   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 IgM 113.     Prior pertinent radiology work-up:  2015: CT LS spine showed degenerative changes resulting in mild to moderate spinal canal narrowing from L2-L5.    Prior electrophysiologic work-up:  6/14: Nerve conduction studies/EMG performed at [facility] showed a left common peroneal neuropathy vs L5 radiculopathy. 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   4/18: NCS/EMG showed multiple findings. See report for details.      Past Medical History:   Past Medical History:   Diagnosis Date     Advanced open-angle glaucoma      Antiplatelet or antithrombotic long-term use      Atrial fibrillation (H)      CKD (chronic kidney disease), stage III (H) 2005     Colon cancer (H)     Stage II-B colon cancer     Coronary artery disease     s/p CABG x 2, JEREMY x 2      Diverticulitis      Hyperlipidemia      Hypertension      Ischemic cardiomyopathy      MGUS (monoclonal gammopathy of unknown significance)      Nonsenile cataract     BE     Pacemaker      Polymorphic ventricular tachycardia (H)      Polymyalgia rheumatica (H)      PVD (posterior vitreous detachment), both eyes 2005     S/P ablation of atrial flutter 6/20/14    CTI     Stented coronary artery      SVT (supraventricular tachycardia) (H)     PPM/AICD for NSVT     Upper leg DVT (deep venous thromboembolism), chronic (H)     Left     Weight loss, unintentional 2017    15 lb in 4 months     Past Surgical History:  Past Surgical History:   Procedure Laterality Date     C CABG, ARTERY-VEIN, FOUR  2006    LIMA-LAD, SVG-Rt PDA, SVG-OM2, SVG-Diag 1     C CABG, VEIN, SINGLE  1994    1-vessel CABG, SVG->PDRCA      CATARACT IOL, RT/LT Bilateral      COLONOSCOPY  3/13/2014    Procedure: COMBINED COLONOSCOPY, SINGLE BIOPSY/POLYPECTOMY BY BIOPSY;;  Surgeon: Mary Gerber MD;  Location:  GI     COLONOSCOPY N/A 6/22/2015    Procedure: COLONOSCOPY;  Surgeon: Marilin Newman MD;  Location:  GI     COLONOSCOPY N/A 11/7/2018    Procedure: COMBINED COLONOSCOPY, SINGLE OR MULTIPLE BIOPSY/POLYPECTOMY BY BIOPSY;  Surgeon: Roberto Esteban MD;  Location:  GI     EP ICD GENERATOR CHANGE DUAL N/A 12/11/2018    Procedure: EP ICD Generator Change Dual;  Surgeon: Deedee Baird MD;  Location:  HEART CARDIAC CATH LAB     H ABLATION ATRIAL FLUTTER       HEART CATH DRUG ELUTING STENT PLACEMENT  4/19/2012    JEREMY x 2 to LCx     IMPLANT IMPLANTABLE CARDIOVERTER DEFIBRILLATOR  5-    ppm/aicd     KNEE SURGERY      right and left knee surgeries     LAPAROSCOPIC ASSISTED COLECTOMY Right 2/1/2019    Procedure: Laparoscopic Converted to Open Transverse Colectomy with Lysis of Adhesions;  Surgeon: Chanelle Guzmán MD;  Location:  OR     LAPAROSCOPIC ASSISTED COLECTOMY LEFT (DESCENDING)  4/8/2014    Procedure:  LAPAROSCOPIC ASSISTED COLECTOMY LEFT (DESCENDING);  Hand assisted Laparoscopic Sigmoid Colectomy , Laparoscopic mobilization of spleenic flexure *Latex Allergy*Anesthesia General with Block;  Surgeon: Chanelle Guzmán MD;  Location: [facility] OR     REPAIR VALVE MITRAL  2006     30-mm Medtronic Julian ring      SELECTIVE LASER TRABECULOPLASTY (SLT) OD (RIGHT EYE)  4/10, 1/12,+1/9/13    RE     SELECTIVE LASER TRABECULOPLASTY (SLT) OS (LEFT EYE)  5/2012     SHOULDER SURGERY      right rotator cuff     Family history:    There is no known family history of hereditary neuropathies or other neuromuscular disorders.    Social History:    He denies tobacco, alcohol, or illicit drug use.     Medical Allergies:     Allergies   Allergen Reactions     Latex Rash     Rash     Current Medications:   Current Outpatient Medications   Medication     acetaminophen (TYLENOL) 500 MG tablet     aspirin 81 MG tablet     atorvastatin (LIPITOR) 40 MG tablet     Blood Pressure Monitor KIT     Cholecalciferol (VITAMIN D-3 PO)     ciclopirox (LOPROX) 0.77 % cream     cyanocobalamin (VITAMIN  B-12) 1000 MCG tablet     doxazosin (CARDURA) 2 MG tablet     ketoconazole (NIZORAL) 2 % cream     loratadine (CLARITIN) 10 MG tablet     metoprolol tartrate (LOPRESSOR) 25 MG tablet     mirtazapine (REMERON) 15 MG tablet     Multiple Vitamins-Minerals (CENTRUM SILVER) per tablet     order for DME     sertraline (ZOLOFT) 100 MG tablet     warfarin (COUMADIN) 5 MG tablet     warfarin (COUMADIN) 7.5 MG tablet     No current facility-administered medications for this visit.      Facility-Administered Medications Ordered in Other Visits   Medication     glycopyrrolate (ROBINUL) injection     neostigmine (PROSTIGMINE) injection     Review of Systems: A review of systems was obtained and was negative except for what was noted above.    Physical examination:    /63   Pulse 100   Wt 143 lb (64.9 kg)   BMI 22.40 kg/m      General Appearance:  NAD    Lower limb edema present    Musculoskeletal:  Pes cavus present.     Neurologic examination:    Mental status:  Patient is alert, attentive, and oriented x 3.  Language is coherent and fluent without aphasia.  Memory, comprehension and ability to follow commands were intact.       Cranial nerves:  Extraocular movements were full. There was no face, jaw, palate or tongue weakness or atrophy.      Motor exam: Atrophy in left TA. No fasciculations. Manual muscle testing revealed the following MRC grade muscle power:   Right Left   Neck flexion 5    Neck extension: 5    Shoulder abduction:  5 5   Elbow extension: 5 5   Elbow flexion:  5 5   Wrist flexion:  5 5   Wrist extension:  5 5   FDI 4 4   APB 4 4   Hip flexion 5 5   Knee extension 5 5   Dorsiflexion 5 3   Plantar flexion 5 5     Complex motor skills: Mild postural hand tremor. No ataxia     Sensory exam: Pin loss in left peroneal distribution. Vibration reduced in toes > ankles.     Gait: Strides short with slightly broad base.     Deep tendon reflexes:   Right Left   Triceps 2 2   Biceps 2 2   Brachioradialis 2 2   Knee jerk 2 2   Ankle jerk 0 0        Assessment:    Noe Florence is a 83 year old man with a multifactorial gait disorder. He has contributions from a length dependant polyneuropathy (associated with a small IgM paraprotein) and probably L4-S1 radiculopathies and a left peroneal neuropathy. His condition is stable. Management remains supportive. All questions answered.     Plan:      1. IgM paraprotein: Repeat serum IF  2. Balance: Continue physical therapy. Will give him another PT referral.   3. He wears a soft left ankle support device. Will give him a PMR referral for help finding a more rigid device.   4. Follow up in about 12 months. Sooner if needed.   ---  ADDENDUM: Serum IF again showed a small IgM kappa monoclonal protein. IgM 111. This is unchanged from 2 years ago. Will inform patient of results. Will continue periodic monitoring.

## 2019-03-05 NOTE — LETTER
3/5/2019       RE: Noe Florence  [address] [facility] [place] [address]     Dear Colleague,    Thank you for referring your patient, Noe Florence, to the [facility] NEUROSPECIALTIES at [facility]. Please see a copy of my visit note below.    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 He has a left foot drop for many years. He is not sure when this started. 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 AlI initially saw him in March 2018 for balance difficulty.     Interval history:  I last saw him 7/3/2018. Since then he had surgery for colon cancer. I understand the surgery went well. He had no chemotherapy. Since his last visit he reports that his left foot is about the same. Weakness persists, but no worse. He still wears a support device on his foot, which he finds helpful. He has tried a more rigid foot brace, but does not wear it due to comfort. His balance is stable. No recent falls. He uses a cane during ambulation. He denies numbness or pain in his feet or hands. No low back pain.  He is doing PT at home and tries to exercises regularly.     Prior pertinent laboratory work-up:  3/18: MAG negative. Urine IF showed no monoclonal protein.   1/18: Normal B12  2/17: Serum IF showed a very small monoclonal IgM immunoglobulin of kappa light chain type    Prior pertinent radiology work-up:  2015: CT LS spine showed degenerative changes resulting in mild to moderate spinal canal narrowing from L2-L5.    Prior electrophysiologic work-up:  6/14: Nerve conduction studies/EMG performed at [facility] showed a left common peroneal neuropathy vs L5 radiculopathy. 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   4/18: NCS/EMG showed multiple findings. See report for details.      Past Medical History:   Past Medical History:   Diagnosis Date      Advanced open-angle glaucoma      Antiplatelet or antithrombotic long-term use      Atrial fibrillation (H)      CKD (chronic kidney disease), stage III (H) 2005     Colon cancer (H)     Stage II-B colon cancer     Coronary artery disease     s/p CABG x 2, JEREMY x 2     Diverticulitis      Hyperlipidemia      Hypertension      Ischemic cardiomyopathy      MGUS (monoclonal gammopathy of unknown significance)      Nonsenile cataract     BE     Pacemaker      Polymorphic ventricular tachycardia (H)      Polymyalgia rheumatica (H)      PVD (posterior vitreous detachment), both eyes 2005     S/P ablation of atrial flutter 6/20/14    CTI     Stented coronary artery      SVT (supraventricular tachycardia) (H)     PPM/AICD for NSVT     Upper leg DVT (deep venous thromboembolism), chronic (H)     Left     Weight loss, unintentional 2017    15 lb in 4 months     Past Surgical History:  Past Surgical History:   Procedure Laterality Date     C CABG, ARTERY-VEIN, FOUR  2006    LIMA-LAD, SVG-Rt PDA, SVG-OM2, SVG-Diag 1     C CABG, VEIN, SINGLE  1994    1-vessel CABG, SVG->PDRCA      CATARACT IOL, RT/LT Bilateral      COLONOSCOPY  3/13/2014    Procedure: COMBINED COLONOSCOPY, SINGLE BIOPSY/POLYPECTOMY BY BIOPSY;;  Surgeon: Mary Gerber MD;  Location:  GI     COLONOSCOPY N/A 6/22/2015    Procedure: COLONOSCOPY;  Surgeon: Marilin Newman MD;  Location:  GI     COLONOSCOPY N/A 11/7/2018    Procedure: COMBINED COLONOSCOPY, SINGLE OR MULTIPLE BIOPSY/POLYPECTOMY BY BIOPSY;  Surgeon: Roberto Esteban MD;  Location:  GI     EP ICD GENERATOR CHANGE DUAL N/A 12/11/2018    Procedure: EP ICD Generator Change Dual;  Surgeon: Deedee Baird MD;  Location:  HEART CARDIAC CATH LAB     H ABLATION ATRIAL FLUTTER       HEART CATH DRUG ELUTING STENT PLACEMENT  4/19/2012    JEREMY x 2 to LCx     IMPLANT IMPLANTABLE CARDIOVERTER DEFIBRILLATOR  5-    ppm/aicd     KNEE SURGERY      right and left knee surgeries      LAPAROSCOPIC ASSISTED COLECTOMY Right 2/1/2019    Procedure: Laparoscopic Converted to Open Transverse Colectomy with Lysis of Adhesions;  Surgeon: Chanelle Guzmán MD;  Location: [facility] OR     LAPAROSCOPIC ASSISTED COLECTOMY LEFT (DESCENDING)  4/8/2014    Procedure: LAPAROSCOPIC ASSISTED COLECTOMY LEFT (DESCENDING);  Hand assisted Laparoscopic Sigmoid Colectomy , Laparoscopic mobilization of spleenic flexure *Latex Allergy*Anesthesia General with Block;  Surgeon: Chanelle Guzmán MD;  Location: [facility] OR     REPAIR VALVE MITRAL  2006     30-mm Medtronic Julian ring      SELECTIVE LASER TRABECULOPLASTY (SLT) OD (RIGHT EYE)  4/10, 1/12,+1/9/13    RE     SELECTIVE LASER TRABECULOPLASTY (SLT) OS (LEFT EYE)  5/2012     SHOULDER SURGERY      right rotator cuff     Family history:    There is no known family history of hereditary neuropathies or other neuromuscular disorders.    Social History:    He denies tobacco, alcohol, or illicit drug use.     Medical Allergies:     Allergies   Allergen Reactions     Latex Rash     Rash     Current Medications:   Current Outpatient Medications   Medication     acetaminophen (TYLENOL) 500 MG tablet     aspirin 81 MG tablet     atorvastatin (LIPITOR) 40 MG tablet     Blood Pressure Monitor KIT     Cholecalciferol (VITAMIN D-3 PO)     ciclopirox (LOPROX) 0.77 % cream     cyanocobalamin (VITAMIN  B-12) 1000 MCG tablet     doxazosin (CARDURA) 2 MG tablet     ketoconazole (NIZORAL) 2 % cream     loratadine (CLARITIN) 10 MG tablet     metoprolol tartrate (LOPRESSOR) 25 MG tablet     mirtazapine (REMERON) 15 MG tablet     Multiple Vitamins-Minerals (CENTRUM SILVER) per tablet     order for DME     sertraline (ZOLOFT) 100 MG tablet     warfarin (COUMADIN) 5 MG tablet     warfarin (COUMADIN) 7.5 MG tablet     No current facility-administered medications for this visit.      Facility-Administered Medications Ordered in Other Visits   Medication     glycopyrrolate (ROBINUL)  injection     neostigmine (PROSTIGMINE) injection     Review of Systems: A review of systems was obtained and was negative except for what was noted above.    Physical examination:    /63   Pulse 100   Wt 143 lb (64.9 kg)   BMI 22.40 kg/m       General Appearance: NAD    Lower limb edema present    Musculoskeletal:  Pes cavus present.     Neurologic examination:    Mental status:  Patient is alert, attentive, and oriented x 3.  Language is coherent and fluent without aphasia.  Memory, comprehension and ability to follow commands were intact.       Cranial nerves:  Extraocular movements were full. There was no face, jaw, palate or tongue weakness or atrophy.      Motor exam: Atrophy in left TA. No fasciculations. Manual muscle testing revealed the following MRC grade muscle power:   Right Left   Neck flexion 5    Neck extension: 5    Shoulder abduction:  5 5   Elbow extension: 5 5   Elbow flexion:  5 5   Wrist flexion:  5 5   Wrist extension:  5 5   FDI 4 4   APB 4 4   Hip flexion 5 5   Knee extension 5 5   Dorsiflexion 5 3   Plantar flexion 5 5     Complex motor skills: Mild postural hand tremor. No ataxia     Sensory exam: Pin loss in left peroneal distribution. Vibration reduced in toes > ankles.     Gait: Strides short with slightly broad base.     Deep tendon reflexes:   Right Left   Triceps 2 2   Biceps 2 2   Brachioradialis 2 2   Knee jerk 2 2   Ankle jerk 0 0        Assessment:    Noe Florence is a 83 year old man with a multifactorial gait disorder. He has contributions from a length dependant polyneuropathy (associated with a small IgM paraprotein) and probably L4-S1 radiculopathies and a left peroneal neuropathy. His condition is stable. Management remains supportive. All questions answered.     Plan:      1. IgM paraprotein: Repeat serum IF  2. Balance: Continue physical therapy. Will give him another PT referral.   3. He wears a soft left ankle support device. Will give him a PMR referral for  help finding a more rigid device.   4. Follow up in about 12 months. Sooner if needed.   ---        Again, thank you for allowing me to participate in the care of your patient.      Sincerely,    Jase Duarte MD

## 2019-03-07 ENCOUNTER — ANTICOAGULATION THERAPY VISIT (OUTPATIENT)
Dept: ANTICOAGULATION | Facility: CLINIC | Age: 84
End: 2019-03-07

## 2019-03-07 ENCOUNTER — PATIENT OUTREACH (OUTPATIENT)
Dept: SURGERY | Facility: CLINIC | Age: 84
End: 2019-03-07

## 2019-03-07 DIAGNOSIS — I48.91 ATRIAL FIBRILLATION (H): ICD-10-CM

## 2019-03-07 LAB
IGA SERPL-MCNC: 485 MG/DL (ref 70–380)
IGG SERPL-MCNC: 1040 MG/DL (ref 695–1620)
IGM SERPL-MCNC: 111 MG/DL (ref 60–265)
INR PPP: 1.7
PROT PATTERN SERPL IFE-IMP: ABNORMAL

## 2019-03-07 NOTE — PROGRESS NOTES
ANTICOAGULATION FOLLOW-UP CLINIC VISIT    Patient Name:  Noe Florence  Date:  3/7/2019  Contact Type:  Telephone    SUBJECTIVE:     Patient Findings     Positives:   No Problem Findings, Unexplained INR or factor level change           OBJECTIVE    INR   Date Value Ref Range Status   2019 1.7  Final       ASSESSMENT / PLAN  INR assessment SUB    Recheck INR In: 1 WEEK    INR Location Clinic      Anticoagulation Summary  As of 3/7/2019    INR goal:   2.0-3.0   TTR:   67.0 % (2.6 y)   INR used for dosin.7! (3/7/2019)   Warfarin maintenance plan:   No maintenance plan   Full warfarin instructions:   3/7: 5 mg; 3/8: 5 mg; 3/9: 2.5 mg; 3/10: 5 mg; 3/11: 5 mg; 3/12: 2.5 mg   Plan last modified:   Mark Ho [facility] (3/7/2019)   Next INR check:   3/13/2019   Priority:   INR   Target end date:   Indefinite    Indications    Atrial fibrillation (H) [I48.91] [I48.91]  Long-term (current) use of anticoagulants [Z79.01] [Z79.01]             Anticoagulation Episode Summary     INR check location:       Preferred lab:       Send INR reminders to:   [facility]    Comments:   Patient contact number: [phone]   Can leave results with Rica (friend)      Anticoagulation Care Providers     Provider Role Specialty Phone number    Deedee Baird MD [facility] Cardiology [phone]            See the Encounter Report to view Anticoagulation Flowsheet and Dosing Calendar (Go to Encounters tab in chart review, and find the Anticoagulation Therapy Visit)    Spoke with Home care RN Brady this is her last Home care Visit. Patient will start coming to lab. He has started to add more fruit and vegetables to his diet,    Mark Ho RP

## 2019-03-07 NOTE — PROGRESS NOTES
Patient's wife was called back in regard to her voicemail message wondering how to go back adding fiber back into the patient's diet. Patient was advised that our recommendation is to start slow with small amounts and slowly increase the amount and frequency of his fiber. Patient brad also take a fiber supplement to keep him having regular soft bowel movements. Rica stated understanding of these instructions and is in agreement with this plan of care.

## 2019-03-12 ENCOUNTER — HOSPITAL ENCOUNTER (INPATIENT)
Facility: CLINIC | Age: 84
LOS: 7 days | Discharge: NURSING, REHAB OR LONG-TERM CARE | DRG: 481 | End: 2019-03-19
Attending: EMERGENCY MEDICINE | Admitting: SURGERY
Payer: COMMERCIAL

## 2019-03-12 ENCOUNTER — APPOINTMENT (OUTPATIENT)
Dept: GENERAL RADIOLOGY | Facility: CLINIC | Age: 84
DRG: 481 | End: 2019-03-12
Attending: EMERGENCY MEDICINE
Payer: COMMERCIAL

## 2019-03-12 ENCOUNTER — APPOINTMENT (OUTPATIENT)
Dept: GENERAL RADIOLOGY | Facility: CLINIC | Age: 84
DRG: 481 | End: 2019-03-12
Payer: COMMERCIAL

## 2019-03-12 ENCOUNTER — APPOINTMENT (OUTPATIENT)
Dept: GENERAL RADIOLOGY | Facility: CLINIC | Age: 84
DRG: 481 | End: 2019-03-12
Attending: NURSE PRACTITIONER
Payer: COMMERCIAL
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
## 2019-03-12 DIAGNOSIS — S72.22XA CLOSED DISPLACED SUBTROCHANTERIC FRACTURE OF LEFT FEMUR, INITIAL ENCOUNTER (H): Primary | ICD-10-CM

## 2019-03-12 DIAGNOSIS — I48.20 CHRONIC ATRIAL FIBRILLATION (H): ICD-10-CM

## 2019-03-12 DIAGNOSIS — S72.002A HIP FRACTURE REQUIRING OPERATIVE REPAIR, LEFT, CLOSED, INITIAL ENCOUNTER (H): ICD-10-CM

## 2019-03-12 LAB
ABO + RH BLD: NORMAL
ABO + RH BLD: NORMAL
ALBUMIN UR-MCNC: NEGATIVE MG/DL
ANION GAP SERPL CALCULATED.3IONS-SCNC: 9 MMOL/L (ref 3–14)
APPEARANCE UR: CLEAR
BASOPHILS # BLD AUTO: 0 10E9/L (ref 0–0.2)
BASOPHILS NFR BLD AUTO: 0.3 %
BILIRUB UR QL STRIP: NEGATIVE
BLD GP AB SCN SERPL QL: NORMAL
BLD PROD TYP BPU: NORMAL
BLOOD BANK CMNT PATIENT-IMP: NORMAL
BUN SERPL-MCNC: 44 MG/DL (ref 7–30)
CALCIUM SERPL-MCNC: 8.6 MG/DL (ref 8.5–10.1)
CHLORIDE SERPL-SCNC: 110 MMOL/L (ref 94–109)
CO2 SERPL-SCNC: 23 MMOL/L (ref 20–32)
COLOR UR AUTO: ABNORMAL
CREAT SERPL-MCNC: 1.43 MG/DL (ref 0.66–1.25)
DIFFERENTIAL METHOD BLD: ABNORMAL
EOSINOPHIL # BLD AUTO: 0.2 10E9/L (ref 0–0.7)
EOSINOPHIL NFR BLD AUTO: 2 %
ERYTHROCYTE [DISTWIDTH] IN BLOOD BY AUTOMATED COUNT: 16.4 % (ref 10–15)
GFR SERPL CREATININE-BSD FRML MDRD: 45 ML/MIN/{1.73_M2}
GLUCOSE SERPL-MCNC: 100 MG/DL (ref 70–99)
GLUCOSE UR STRIP-MCNC: NEGATIVE MG/DL
HCT VFR BLD AUTO: 25.6 % (ref 40–53)
HGB BLD-MCNC: 7.8 G/DL (ref 13.3–17.7)
HGB UR QL STRIP: NEGATIVE
IMM GRANULOCYTES # BLD: 0 10E9/L (ref 0–0.4)
IMM GRANULOCYTES NFR BLD: 0.3 %
INR PPP: 2.35 (ref 0.86–1.14)
KETONES UR STRIP-MCNC: 10 MG/DL
LEUKOCYTE ESTERASE UR QL STRIP: NEGATIVE
LYMPHOCYTES # BLD AUTO: 0.6 10E9/L (ref 0.8–5.3)
LYMPHOCYTES NFR BLD AUTO: 6.9 %
MCH RBC QN AUTO: 28 PG (ref 26.5–33)
MCHC RBC AUTO-ENTMCNC: 30.5 G/DL (ref 31.5–36.5)
MCV RBC AUTO: 92 FL (ref 78–100)
MONOCYTES # BLD AUTO: 0.5 10E9/L (ref 0–1.3)
MONOCYTES NFR BLD AUTO: 6.4 %
MUCOUS THREADS #/AREA URNS LPF: PRESENT /LPF
NEUTROPHILS # BLD AUTO: 6.7 10E9/L (ref 1.6–8.3)
NEUTROPHILS NFR BLD AUTO: 84.1 %
NITRATE UR QL: NEGATIVE
NRBC # BLD AUTO: 0 10*3/UL
NRBC BLD AUTO-RTO: 0 /100
NUM BPU REQUESTED: 1
PH UR STRIP: 5.5 PH (ref 5–7)
PLATELET # BLD AUTO: 236 10E9/L (ref 150–450)
POTASSIUM SERPL-SCNC: 4.5 MMOL/L (ref 3.4–5.3)
RBC # BLD AUTO: 2.79 10E12/L (ref 4.4–5.9)
RBC #/AREA URNS AUTO: 3 /HPF (ref 0–2)
SODIUM SERPL-SCNC: 143 MMOL/L (ref 133–144)
SOURCE: ABNORMAL
SP GR UR STRIP: 1.01 (ref 1–1.03)
SPECIMEN EXP DATE BLD: NORMAL
SQUAMOUS #/AREA URNS AUTO: <1 /HPF (ref 0–1)
UROBILINOGEN UR STRIP-MCNC: NORMAL MG/DL (ref 0–2)
WBC # BLD AUTO: 7.9 10E9/L (ref 4–11)
WBC #/AREA URNS AUTO: <1 /HPF (ref 0–5)

## 2019-03-12 PROCEDURE — 99285 EMERGENCY DEPT VISIT HI MDM: CPT | Mod: 25 | Performed by: EMERGENCY MEDICINE

## 2019-03-12 PROCEDURE — 86923 COMPATIBILITY TEST ELECTRIC: CPT | Performed by: EMERGENCY MEDICINE

## 2019-03-12 PROCEDURE — 25800030 ZZH RX IP 258 OP 636: Performed by: NURSE PRACTITIONER

## 2019-03-12 PROCEDURE — 99207 ZZC CONSULT E&M CHANGED TO SUBSEQUENT LEVEL: CPT | Performed by: NURSE PRACTITIONER

## 2019-03-12 PROCEDURE — 86900 BLOOD TYPING SEROLOGIC ABO: CPT | Performed by: EMERGENCY MEDICINE

## 2019-03-12 PROCEDURE — 25800030 ZZH RX IP 258 OP 636: Performed by: EMERGENCY MEDICINE

## 2019-03-12 PROCEDURE — 25000128 H RX IP 250 OP 636: Performed by: STUDENT IN AN ORGANIZED HEALTH CARE EDUCATION/TRAINING PROGRAM

## 2019-03-12 PROCEDURE — 96375 TX/PRO/DX INJ NEW DRUG ADDON: CPT | Performed by: EMERGENCY MEDICINE

## 2019-03-12 PROCEDURE — 93005 ELECTROCARDIOGRAM TRACING: CPT | Performed by: EMERGENCY MEDICINE

## 2019-03-12 PROCEDURE — 93010 ELECTROCARDIOGRAM REPORT: CPT | Mod: Z6 | Performed by: EMERGENCY MEDICINE

## 2019-03-12 PROCEDURE — 73552 X-RAY EXAM OF FEMUR 2/>: CPT | Mod: LT

## 2019-03-12 PROCEDURE — 25000128 H RX IP 250 OP 636: Performed by: NURSE PRACTITIONER

## 2019-03-12 PROCEDURE — 86850 RBC ANTIBODY SCREEN: CPT | Performed by: EMERGENCY MEDICINE

## 2019-03-12 PROCEDURE — 99222 1ST HOSP IP/OBS MODERATE 55: CPT | Performed by: NURSE PRACTITIONER

## 2019-03-12 PROCEDURE — 99232 SBSQ HOSP IP/OBS MODERATE 35: CPT | Performed by: NURSE PRACTITIONER

## 2019-03-12 PROCEDURE — 12000001 ZZH R&B MED SURG/OB UMMC

## 2019-03-12 PROCEDURE — 73502 X-RAY EXAM HIP UNI 2-3 VIEWS: CPT

## 2019-03-12 PROCEDURE — 25000132 ZZH RX MED GY IP 250 OP 250 PS 637: Performed by: NURSE PRACTITIONER

## 2019-03-12 PROCEDURE — 81001 URINALYSIS AUTO W/SCOPE: CPT | Performed by: NURSE PRACTITIONER

## 2019-03-12 PROCEDURE — 96360 HYDRATION IV INFUSION INIT: CPT | Performed by: EMERGENCY MEDICINE

## 2019-03-12 PROCEDURE — 80048 BASIC METABOLIC PNL TOTAL CA: CPT | Performed by: EMERGENCY MEDICINE

## 2019-03-12 PROCEDURE — 71045 X-RAY EXAM CHEST 1 VIEW: CPT

## 2019-03-12 PROCEDURE — 96361 HYDRATE IV INFUSION ADD-ON: CPT | Performed by: EMERGENCY MEDICINE

## 2019-03-12 PROCEDURE — 85610 PROTHROMBIN TIME: CPT | Performed by: EMERGENCY MEDICINE

## 2019-03-12 PROCEDURE — 86901 BLOOD TYPING SEROLOGIC RH(D): CPT | Performed by: EMERGENCY MEDICINE

## 2019-03-12 PROCEDURE — 85025 COMPLETE CBC W/AUTO DIFF WBC: CPT | Performed by: EMERGENCY MEDICINE

## 2019-03-12 RX ORDER — HYDROMORPHONE HYDROCHLORIDE 1 MG/ML
.3-.5 INJECTION, SOLUTION INTRAMUSCULAR; INTRAVENOUS; SUBCUTANEOUS
Status: COMPLETED | OUTPATIENT
Start: 2019-03-12 | End: 2019-03-12

## 2019-03-12 RX ORDER — MAGNESIUM SULFATE HEPTAHYDRATE 40 MG/ML
4 INJECTION, SOLUTION INTRAVENOUS EVERY 4 HOURS PRN
Status: DISCONTINUED | OUTPATIENT
Start: 2019-03-12 | End: 2019-03-19 | Stop reason: HOSPADM

## 2019-03-12 RX ORDER — METHOCARBAMOL 750 MG/1
750 TABLET, FILM COATED ORAL EVERY 6 HOURS PRN
Status: DISCONTINUED | OUTPATIENT
Start: 2019-03-12 | End: 2019-03-19 | Stop reason: HOSPADM

## 2019-03-12 RX ORDER — PROCHLORPERAZINE 25 MG
12.5 SUPPOSITORY, RECTAL RECTAL EVERY 12 HOURS PRN
Status: DISCONTINUED | OUTPATIENT
Start: 2019-03-12 | End: 2019-03-19 | Stop reason: HOSPADM

## 2019-03-12 RX ORDER — POTASSIUM CL/LIDO/0.9 % NACL 10MEQ/0.1L
10 INTRAVENOUS SOLUTION, PIGGYBACK (ML) INTRAVENOUS
Status: DISCONTINUED | OUTPATIENT
Start: 2019-03-12 | End: 2019-03-19 | Stop reason: HOSPADM

## 2019-03-12 RX ORDER — ATORVASTATIN CALCIUM 40 MG/1
40 TABLET, FILM COATED ORAL DAILY
Status: DISCONTINUED | OUTPATIENT
Start: 2019-03-13 | End: 2019-03-19 | Stop reason: HOSPADM

## 2019-03-12 RX ORDER — POTASSIUM CHLORIDE 1.5 G/1.58G
20-40 POWDER, FOR SOLUTION ORAL
Status: DISCONTINUED | OUTPATIENT
Start: 2019-03-12 | End: 2019-03-19 | Stop reason: HOSPADM

## 2019-03-12 RX ORDER — LANOLIN ALCOHOL/MO/W.PET/CERES
1000 CREAM (GRAM) TOPICAL 2 TIMES DAILY
Status: DISCONTINUED | OUTPATIENT
Start: 2019-03-13 | End: 2019-03-19 | Stop reason: HOSPADM

## 2019-03-12 RX ORDER — SODIUM CHLORIDE 9 MG/ML
INJECTION, SOLUTION INTRAVENOUS CONTINUOUS
Status: DISCONTINUED | OUTPATIENT
Start: 2019-03-12 | End: 2019-03-15

## 2019-03-12 RX ORDER — EMOLLIENT BASE
CREAM (GRAM) TOPICAL PRN
COMMUNITY
End: 2019-05-27

## 2019-03-12 RX ORDER — ONDANSETRON 2 MG/ML
4 INJECTION INTRAMUSCULAR; INTRAVENOUS EVERY 6 HOURS PRN
Status: DISCONTINUED | OUTPATIENT
Start: 2019-03-12 | End: 2019-03-19 | Stop reason: HOSPADM

## 2019-03-12 RX ORDER — LANOLIN ALCOHOL/MO/W.PET/CERES
1000 CREAM (GRAM) TOPICAL 2 TIMES DAILY
COMMUNITY
End: 2019-05-27

## 2019-03-12 RX ORDER — MULTIPLE VITAMINS W/ MINERALS TAB 9MG-400MCG
1 TAB ORAL DAILY
Status: DISCONTINUED | OUTPATIENT
Start: 2019-03-13 | End: 2019-03-19 | Stop reason: HOSPADM

## 2019-03-12 RX ORDER — PROCHLORPERAZINE MALEATE 5 MG
5 TABLET ORAL EVERY 6 HOURS PRN
Status: DISCONTINUED | OUTPATIENT
Start: 2019-03-12 | End: 2019-03-19 | Stop reason: HOSPADM

## 2019-03-12 RX ORDER — POLYETHYLENE GLYCOL 3350 17 G/17G
17 POWDER, FOR SOLUTION ORAL DAILY
Status: DISCONTINUED | OUTPATIENT
Start: 2019-03-13 | End: 2019-03-19 | Stop reason: HOSPADM

## 2019-03-12 RX ORDER — POTASSIUM CHLORIDE 7.45 MG/ML
10 INJECTION INTRAVENOUS
Status: DISCONTINUED | OUTPATIENT
Start: 2019-03-12 | End: 2019-03-19 | Stop reason: HOSPADM

## 2019-03-12 RX ORDER — DOXAZOSIN 1 MG/1
1 TABLET ORAL 2 TIMES DAILY
COMMUNITY
End: 2019-04-09

## 2019-03-12 RX ORDER — SERTRALINE HYDROCHLORIDE 100 MG/1
100 TABLET, FILM COATED ORAL EVERY EVENING
Status: DISCONTINUED | OUTPATIENT
Start: 2019-03-12 | End: 2019-03-19 | Stop reason: HOSPADM

## 2019-03-12 RX ORDER — POTASSIUM CHLORIDE 29.8 MG/ML
20 INJECTION INTRAVENOUS
Status: DISCONTINUED | OUTPATIENT
Start: 2019-03-12 | End: 2019-03-12 | Stop reason: ALTCHOICE

## 2019-03-12 RX ORDER — CALCIUM CARBONATE 500(1250)
500 TABLET ORAL 2 TIMES DAILY WITH MEALS
Status: DISCONTINUED | OUTPATIENT
Start: 2019-03-13 | End: 2019-03-19 | Stop reason: HOSPADM

## 2019-03-12 RX ORDER — NALOXONE HYDROCHLORIDE 0.4 MG/ML
.1-.4 INJECTION, SOLUTION INTRAMUSCULAR; INTRAVENOUS; SUBCUTANEOUS
Status: DISCONTINUED | OUTPATIENT
Start: 2019-03-12 | End: 2019-03-19 | Stop reason: HOSPADM

## 2019-03-12 RX ORDER — METOPROLOL TARTRATE 25 MG/1
25 TABLET, FILM COATED ORAL 2 TIMES DAILY
Status: DISCONTINUED | OUTPATIENT
Start: 2019-03-12 | End: 2019-03-19 | Stop reason: HOSPADM

## 2019-03-12 RX ORDER — POTASSIUM CHLORIDE 750 MG/1
20-40 TABLET, EXTENDED RELEASE ORAL
Status: DISCONTINUED | OUTPATIENT
Start: 2019-03-12 | End: 2019-03-19 | Stop reason: HOSPADM

## 2019-03-12 RX ORDER — BISACODYL 10 MG
10 SUPPOSITORY, RECTAL RECTAL DAILY PRN
Status: DISCONTINUED | OUTPATIENT
Start: 2019-03-12 | End: 2019-03-19 | Stop reason: HOSPADM

## 2019-03-12 RX ORDER — HYDROMORPHONE HCL/0.9% NACL/PF 0.2MG/0.2
0.2 SYRINGE (ML) INTRAVENOUS
Status: DISCONTINUED | OUTPATIENT
Start: 2019-03-12 | End: 2019-03-19 | Stop reason: HOSPADM

## 2019-03-12 RX ORDER — MIRTAZAPINE 15 MG/1
15 TABLET, FILM COATED ORAL AT BEDTIME
Status: DISCONTINUED | OUTPATIENT
Start: 2019-03-12 | End: 2019-03-19 | Stop reason: HOSPADM

## 2019-03-12 RX ORDER — AMOXICILLIN 250 MG
1-2 CAPSULE ORAL 2 TIMES DAILY
Status: DISCONTINUED | OUTPATIENT
Start: 2019-03-12 | End: 2019-03-19 | Stop reason: HOSPADM

## 2019-03-12 RX ORDER — ONDANSETRON 4 MG/1
4 TABLET, ORALLY DISINTEGRATING ORAL EVERY 6 HOURS PRN
Status: DISCONTINUED | OUTPATIENT
Start: 2019-03-12 | End: 2019-03-19 | Stop reason: HOSPADM

## 2019-03-12 RX ORDER — LIDOCAINE 4 G/G
1 PATCH TOPICAL
Status: DISCONTINUED | OUTPATIENT
Start: 2019-03-13 | End: 2019-03-19 | Stop reason: HOSPADM

## 2019-03-12 RX ORDER — WARFARIN SODIUM 5 MG/1
TABLET ORAL
Status: ON HOLD | COMMUNITY
End: 2019-03-19

## 2019-03-12 RX ORDER — ACETAMINOPHEN 325 MG/1
975 TABLET ORAL EVERY 6 HOURS
Status: DISCONTINUED | OUTPATIENT
Start: 2019-03-12 | End: 2019-03-15

## 2019-03-12 RX ADMIN — SENNOSIDES AND DOCUSATE SODIUM 2 TABLET: 8.6; 5 TABLET ORAL at 20:49

## 2019-03-12 RX ADMIN — METOPROLOL TARTRATE 25 MG: 25 TABLET ORAL at 20:49

## 2019-03-12 RX ADMIN — MIRTAZAPINE 15 MG: 15 TABLET, FILM COATED ORAL at 20:58

## 2019-03-12 RX ADMIN — SERTRALINE HYDROCHLORIDE 100 MG: 100 TABLET ORAL at 20:49

## 2019-03-12 RX ADMIN — SODIUM CHLORIDE: 9 INJECTION, SOLUTION INTRAVENOUS at 15:06

## 2019-03-12 RX ADMIN — ACETAMINOPHEN 975 MG: 325 TABLET, FILM COATED ORAL at 20:48

## 2019-03-12 RX ADMIN — PHYTONADIONE 5 MG: 10 INJECTION, EMULSION INTRAMUSCULAR; INTRAVENOUS; SUBCUTANEOUS at 18:41

## 2019-03-12 RX ADMIN — Medication 0.5 MG: at 18:41

## 2019-03-12 ASSESSMENT — ACTIVITIES OF DAILY LIVING (ADL)
COGNITION: 0 - NO COGNITION ISSUES REPORTED
RETIRED_EATING: 0-->INDEPENDENT
FALL_HISTORY_WITHIN_LAST_SIX_MONTHS: NO
DRESS: 0-->INDEPENDENT
TOILETING: 0-->INDEPENDENT
ADLS_ACUITY_SCORE: 14
SWALLOWING: 0-->SWALLOWS FOODS/LIQUIDS WITHOUT DIFFICULTY
WHICH_OF_THE_ABOVE_FUNCTIONAL_RISKS_HAD_A_RECENT_ONSET_OR_CHANGE?: AMBULATION
TRANSFERRING: 0-->INDEPENDENT
AMBULATION: 1-->ASSISTIVE EQUIPMENT
BATHING: 0-->INDEPENDENT
RETIRED_COMMUNICATION: 0-->UNDERSTANDS/COMMUNICATES WITHOUT DIFFICULTY

## 2019-03-12 ASSESSMENT — ENCOUNTER SYMPTOMS
ACTIVITY CHANGE: 1
NECK PAIN: 0
GASTROINTESTINAL NEGATIVE: 1
HEADACHES: 0
ARTHRALGIAS: 1
BACK PAIN: 0

## 2019-03-12 NOTE — ED TRIAGE NOTES
Pt was taking out the trash and slipped on the ice injuring his left hip; EMS states pt was on the ground for 10 minutes until EMS was called, pt denies LOC. Pt takes coumadin.

## 2019-03-12 NOTE — LETTER
Transition Communication Hand-off for Care Transitions to Next Level of Care Provider    Name: Noe Florence  : 1935  MRN #: [id number]  Primary Care Provider: Roberto Sarmiento     Primary Clinic: [address] [place] [id number]     Reason for Hospitalization:  Fall, initial encounter [W19.XXXA]  Hip fracture requiring operative repair, left, closed, initial encounter (H) [S72.002A]  Admit Date/Time: 3/12/2019  2:17 PM  Discharge Date: 3/19/19  Payor Source: Payor: [facility] / Plan: [facility] MEDICARE / Product Type: HMO /     Readmission Assessment Measure (GUILLE) Risk Score/category: Elevated         Reason for Communication Hand-off Referral: Other D/c to ARU    Discharge Plan:  Patient Name:  Noe Florence     Anticipated Discharge Date:  3/19/19    Discharge Disposition:   ARU:   ARU   [address] 5th floor  [place], [place]  [phone]     Concern for non-adherence with plan of care:   Y/N Unknown  Discharge Needs Assessment:  Needs      Most Recent Value   Equipment Currently Used at Home  shower chair, raised toilet [has cane]          Already enrolled in Tele-monitoring program and name of program:  Unknown  Follow-up specialty is recommended: Unknown    Follow-up plan:    Future Appointments   Date Time Provider Department Center   3/29/2019  9:45 AM UC LAB [facility]   3[facility]29[facility]2019 10[facility]   2019  5:00 PM Francisco Gilliam MD [facility]   4/3/2019  1:45 PM Chanelle Guzmán MD [facility]   4/3/2019  3:00 PM Brayden Chavez, PT [facility]   2019 11:30 AM UC CV DEVICE 1 [facility]   2019  1:00 PM Jaye Alexander APRN [facility]   2019  3:00 PM Frida Desai MD [facility]   2019 10:20 AM Roberto Sarmiento MD [facility]       Any outstanding tests or procedures:        Referrals     Future Labs/Procedures    Occupational Therapy Adult Consult     Comments:    Evaluate and treat as clinically  indicated.    Reason:  Fall with intertrochanteric fracture s/p surgical repair    Physical Therapy Adult Consult     Comments:    Evaluate and treat as clinically indicated.    Reason:  Fall with left intertrochanteric fracture s/p surgical repair.          Supplies     Future Labs/Procedures    Pneumatic Compression Device      Comments:    Bilateral calf. Remove 30 mins BID.          YUE Villeda, LICSW  7B   [phone] (pager) [phone]  3/19/2019

## 2019-03-12 NOTE — LETTER
"Transition Communication Hand-off for Care Transitions to Next Level of Care Provider    Name: Noe Florence  : 1935  MRN #: [id number]  Primary Care Provider: Roberto Sarmiento     Primary Clinic: [address] [place] [address]     Reason for Hospitalization:  Fall, initial encounter [W19.XXXA]  Hip fracture requiring operative repair, left, closed, initial encounter (H) [S72.002A]  Admit Date/Time: 3/12/2019  2:17 PM  Discharge Date: 3/19/19  Payor Source: Payor: [facility] / Plan: [facility] MEDICARE / Product Type: HMO /     Readmission Assessment Measure (GUILLE) Risk Score/category: Elevated         Reason for Communication Hand-off Referral: Other D/c to ARU    Discharge Plan:  Patient Name:  Noe Florence     Anticipated Discharge Date:  3/19/19    Discharge Disposition:   ARU:   ARU   [address] 5th floor  [place], [place]  [phone]     Concern for non-adherence with plan of care:   Y/N ***  Discharge Needs Assessment:  Needs      Most Recent Value   Equipment Currently Used at Home  shower chair, raised toilet [has cane]          Already enrolled in Tele-monitoring program and name of program:  ***  Follow-up specialty is recommended: {YES / NO:[id number]::\"Yes\"}    Follow-up plan:    Future Appointments   Date Time Provider Department Center   3/29/2019  9:45 AM  LAB [facility]   3/29/2019 10:20 AM [facility]   2019  5:00 PM Francisco Gilliam MD [facility]   4/3/2019  1:45 PM Chanelle Guzmán MD [facility]   4/3/2019  3:00 PM Brayden Chavez, PT [facility]   2019 11:30 AM UC CV DEVICE 1 [facility]   2019  1:00 PM Jaye Alexander APRN CNP [facility]   2019  3:00 PM Frida Desai MD [facility]   2019 10:20 AM Roberto Sarmiento MD [facility]       Any outstanding tests or procedures:        Referrals     Future Labs/Procedures    Occupational Therapy Adult Consult     Comments:    Evaluate and treat as " clinically indicated.    Reason:  Fall with intertrochanteric fracture s/p surgical repair    Physical Therapy Adult Consult     Comments:    Evaluate and treat as clinically indicated.    Reason:  Fall with left intertrochanteric fracture s/p surgical repair.          Supplies     Future Labs/Procedures    Pneumatic Compression Device      Comments:    Bilateral calf. Remove 30 mins BID.          Key Recommendations:      Becca Cast    AVS/Discharge Summary is the source of truth; this is a helpful guide for improved communication of patient story

## 2019-03-12 NOTE — ED PROVIDER NOTES
[place] EMERGENCY DEPARTMENT ([facility])  3/12/19   History     Chief Complaint   Patient presents with     Hip Pain     The history is provided by the patient and medical records.     Noe Florence is a 83 year old male who was taking the trash out when he slipped and fell onto his left side. He was not able to ambulate afterwards. His pain is in his left hip. He did not injure his head, neck, or back.  His left leg is shortened and externally rotated.  He is on warfarin for atrial fibrillation.    This part of the medical record was transcribed by John Maddox Medical Scribe, from a dictation done by Stephen Loya MD.      I have reviewed the Medications, Allergies, Past Medical and Surgical History, and Social History in the [employer] system.    Past Medical History:   Diagnosis Date     Advanced open-angle glaucoma      Antiplatelet or antithrombotic long-term use      Atrial fibrillation (H)      CKD (chronic kidney disease), stage III (H) 2005     Colon cancer (H)     Stage II-B colon cancer     Coronary artery disease     s/p CABG x 2, JEREMY x 2     Diverticulitis      Hyperlipidemia      Hypertension      Ischemic cardiomyopathy      MGUS (monoclonal gammopathy of unknown significance)      Nonsenile cataract     BE     Pacemaker      Polymorphic ventricular tachycardia (H)      Polymyalgia rheumatica (H)      PVD (posterior vitreous detachment), both eyes 2005     S/P ablation of atrial flutter 6/20/14    CTI     Stented coronary artery      SVT (supraventricular tachycardia) (H)     PPM/AICD for NSVT     Upper leg DVT (deep venous thromboembolism), chronic (H)     Left     Weight loss, unintentional 2017    15 lb in 4 months       Past Surgical History:   Procedure Laterality Date     C CABG, ARTERY-VEIN, FOUR  2006    LIMA-LAD, SVG-Rt PDA, SVG-OM2, SVG-Diag 1     C CABG, VEIN, SINGLE  1994    1-vessel CABG, SVG->PDRCA      CATARACT IOL, RT/LT Bilateral      COLONOSCOPY  3/13/2014     Procedure: COMBINED COLONOSCOPY, SINGLE BIOPSY/POLYPECTOMY BY BIOPSY;;  Surgeon: Mary Gerber MD;  Location: [facility]     COLONOSCOPY N/A 6/22/2015    Procedure: COLONOSCOPY;  Surgeon: Marilin Newman MD;  Location: [facility]     COLONOSCOPY N/A 11/7/2018    Procedure: COMBINED COLONOSCOPY, SINGLE OR MULTIPLE BIOPSY/POLYPECTOMY BY BIOPSY;  Surgeon: Roberto Esteban MD;  Location:  GI     EP ICD GENERATOR CHANGE DUAL N/A 12/11/2018    Procedure: EP ICD Generator Change Dual;  Surgeon: Deedee Baird MD;  Location:  HEART CARDIAC CATH LAB     H ABLATION ATRIAL FLUTTER       HEART CATH DRUG ELUTING STENT PLACEMENT  4/19/2012    JEREMY x 2 to LCx     IMPLANT IMPLANTABLE CARDIOVERTER DEFIBRILLATOR  5-    ppm/aicd     KNEE SURGERY      right and left knee surgeries     LAPAROSCOPIC ASSISTED COLECTOMY Right 2/1/2019    Procedure: Laparoscopic Converted to Open Transverse Colectomy with Lysis of Adhesions;  Surgeon: Chanelle Guzmán MD;  Location:  OR     LAPAROSCOPIC ASSISTED COLECTOMY LEFT (DESCENDING)  4/8/2014    Procedure: LAPAROSCOPIC ASSISTED COLECTOMY LEFT (DESCENDING);  Hand assisted Laparoscopic Sigmoid Colectomy , Laparoscopic mobilization of spleenic flexure *Latex Allergy*Anesthesia General with Block;  Surgeon: Chanelle Guzmán MD;  Location:  OR     REPAIR VALVE MITRAL  2006     30-mm Medtronic Julian ring      SELECTIVE LASER TRABECULOPLASTY (SLT) OD (RIGHT EYE)  4/10, 1/12,+1/9/13    RE     SELECTIVE LASER TRABECULOPLASTY (SLT) OS (LEFT EYE)  5/2012     SHOULDER SURGERY      right rotator cuff       Family History   Problem Relation Age of Onset     C.A.D. Father      Anesthesia Reaction No family hx of      Crohn's Disease No family hx of      Ulcerative Colitis No family hx of      Cancer - colorectal No family hx of      Macular Degeneration No family hx of      Cancer No family hx of         No known family hx of skin cancer     Melanoma No family hx of       Skin Cancer No family hx of        Social History     Tobacco Use     Smoking status: Former Smoker     Types: Cigarettes, Cigars     Last attempt to quit: 1968     Years since quittin.2     Smokeless tobacco: Never Used   Substance Use Topics     Alcohol use: Yes     Alcohol/week: 0.0 oz     Comment: 2-3 drinks twice weekly       Current Facility-Administered Medications   Medication     sodium chloride 0.9% infusion     Current Outpatient Medications   Medication     aspirin 81 MG tablet     atorvastatin (LIPITOR) 40 MG tablet     Blood Pressure Monitor KIT     Cholecalciferol (VITAMIN D-3 PO)     ciclopirox (LOPROX) 0.77 % cream     cyanocobalamin (VITAMIN  B-12) 1000 MCG tablet     doxazosin (CARDURA) 2 MG tablet     ketoconazole (NIZORAL) 2 % cream     loratadine (CLARITIN) 10 MG tablet     metoprolol tartrate (LOPRESSOR) 25 MG tablet     mirtazapine (REMERON) 15 MG tablet     Multiple Vitamins-Minerals (CENTRUM SILVER) per tablet     order for DME     sertraline (ZOLOFT) 100 MG tablet     warfarin (COUMADIN) 5 MG tablet     warfarin (COUMADIN) 7.5 MG tablet     Facility-Administered Medications Ordered in Other Encounters   Medication     glycopyrrolate (ROBINUL) injection     neostigmine (PROSTIGMINE) injection        Allergies   Allergen Reactions     Latex Rash     Rash        Review of Systems   Constitutional: Positive for activity change.   Gastrointestinal: Negative.    Musculoskeletal: Positive for arthralgias (left hip pain) and gait problem (shortened left leg, and externally rotated). Negative for back pain and neck pain.   Neurological: Negative for headaches.   All other systems reviewed and are negative.      Physical Exam   BP: 129/76  Heart Rate: 100  Temp: 98  F (36.7  C)  Resp: 16  SpO2: 95 %      Physical Exam   Constitutional: He is oriented to person, place, and time. He appears well-developed and well-nourished. No distress.   HENT:   Head: Normocephalic and atraumatic.    Eyes: Conjunctivae and EOM are normal. Pupils are equal, round, and reactive to light. No scleral icterus.   Neck: Neck supple.   Cardiovascular: Normal rate and normal heart sounds. An irregularly irregular rhythm present.   Pulmonary/Chest: Effort normal and breath sounds normal. No respiratory distress. He has no wheezes.   Musculoskeletal:        Left hip: He exhibits decreased range of motion, decreased strength, tenderness and deformity.   Left leg shortened and externally rotated   Neurological: He is alert and oriented to person, place, and time. No cranial nerve deficit.   Skin: Skin is warm and dry.   Psychiatric: He has a normal mood and affect. His behavior is normal.   Nursing note and vitals reviewed.      ED Course        Procedures             EKG Interpretation:      Interpreted by Stephen Loya  Time reviewed: 4:41 PM   Symptoms at time of EKG: preop   Rhythm: paced  Rate: normal  Axis: normal  Ectopy: none  Conduction: normal  ST Segments/ T Waves: No ST-T wave changes  Q Waves: none  Comparison to prior: Previous atrial pacing now ventricular pacing    Clinical Impression: Ventricular paced                Labs Ordered and Resulted from Time of ED Arrival Up to the Time of Departure from the ED   CBC WITH PLATELETS DIFFERENTIAL - Abnormal; Notable for the following components:       Result Value    RBC Count 2.79 (*)     Hemoglobin 7.8 (*)     Hematocrit 25.6 (*)     MCHC 30.5 (*)     RDW 16.4 (*)     Absolute Lymphocytes 0.6 (*)     All other components within normal limits   BASIC METABOLIC PANEL - Abnormal; Notable for the following components:    Chloride 110 (*)     Glucose 100 (*)     Urea Nitrogen 44 (*)     Creatinine 1.43 (*)     GFR Estimate 45 (*)     GFR Estimate If Black 52 (*)     All other components within normal limits   INR - Abnormal; Notable for the following components:    INR 2.35 (*)     All other components within normal limits   ICE TO AFFECTED AREA   RED BLOOD  CELL HAVE AVAILABLE   ABO/RH TYPE AND SCREEN            Assessments & Plan (with Medical Decision Making)   83-year-old male with slip and fall from standing position resulting in left intertrochanteric hip fracture without other injuries orthopedics is aware trauma has seen the patient and will admit him preoperative laboratories are ordered he is anticoagulated and will need to be reversed by trauma.  Pain is well controlled he has no other injuries and is stable.    I have reviewed the nursing notes.    I have reviewed the findings, diagnosis, plan and need for follow up with the patient.       Medication List      ASK your doctor about these medications    acetaminophen 500 MG tablet  Commonly known as:  TYLENOL  1,000 mg, Oral, 4 TIMES DAILY  Ask about: Should I take this medication?     traMADol 50 MG tablet  Commonly known as:  ULTRAM  50 mg, Oral, EVERY 6 HOURS PRN  Ask about: Should I take this medication?            Final diagnoses:   Hip fracture requiring operative repair, left, closed, initial encounter (H)   Fall, initial encounter       3/12/2019   [facility], [place], EMERGENCY DEPARTMENT     Stephen Loya MD  03/12/19 6731

## 2019-03-12 NOTE — ED NOTES
[facility], [place]   ED Nurse to Floor Handoff     Noe Florence is a 83 year old male who speaks English and lives with a spouse,  in a home  They arrived in the ED by ambulance from home    ED Chief Complaint: Hip Pain    ED Dx;   Final diagnoses:   Hip fracture requiring operative repair, left, closed, initial encounter (H)   Fall, initial encounter         Needed?: No    Allergies:   Allergies   Allergen Reactions     Latex Rash     Rash   .  Past Medical Hx:   Past Medical History:   Diagnosis Date     Advanced open-angle glaucoma      Antiplatelet or antithrombotic long-term use      Atrial fibrillation (H)      CKD (chronic kidney disease), stage III (H) 2005     Colon cancer (H)     Stage II-B colon cancer     Coronary artery disease     s/p CABG x 2, JEREMY x 2     Diverticulitis      Hyperlipidemia      Hypertension      Ischemic cardiomyopathy      MGUS (monoclonal gammopathy of unknown significance)      Nonsenile cataract     BE     Pacemaker      Polymorphic ventricular tachycardia (H)      Polymyalgia rheumatica (H)      PVD (posterior vitreous detachment), both eyes 2005     S/P ablation of atrial flutter 6/20/14    CTI     Stented coronary artery      SVT (supraventricular tachycardia) (H)     PPM/AICD for NSVT     Upper leg DVT (deep venous thromboembolism), chronic (H)     Left     Weight loss, unintentional 2017    15 lb in 4 months      Baseline Mental status: WDL  Current Mental Status changes: at basesline    Infection present or suspected this encounter: no  Sepsis suspected: No  Isolation type: No active isolations     Activity level - Baseline/Home:  Independent  Activity Level - Current:   bed rest    Bariatric equipment needed?: No    In the ED these meds were given:   Medications   sodium chloride 0.9% infusion ( Intravenous New Bag 3/12/19 1506)       Drips running?  Yes    Home pump  No    Current LDAs  Peripheral IV 12/11/18 Left Lower forearm  (Active)   Number of days: 91       Closed/Suction Drain 1 Left Abdomen Bulb 19 Sami (Active)   Number of days: 1799       Wound 11/04/17 Left Leg Abrasion(s) (Active)   Number of days: 493       Incision/Surgical Site 04/08/14 Midline Abdomen (Active)   Number of days: 1799       Incision/Surgical Site 06/20/14 Right Groin (Active)   Number of days: 1726       Incision/Surgical Site 09/09/14 Left Groin (Active)   Number of days: 1645       Incision/Surgical Site Right Groin (Active)   Number of days:        Incision/Surgical Site 02/01/19 Midline;Upper Abdomen (Active)   Number of days: 39       Incision/Surgical Site 02/01/19 Left;Upper;Quadrant Abdomen (Active)   Number of days: 39       Incision/Surgical Site 02/01/19 Left;Lower;Quadrant Abdomen (Active)   Number of days: 39       Labs results:   Labs Ordered and Resulted from Time of ED Arrival Up to the Time of Departure from the ED   CBC WITH PLATELETS DIFFERENTIAL - Abnormal; Notable for the following components:       Result Value    RBC Count 2.79 (*)     Hemoglobin 7.8 (*)     Hematocrit 25.6 (*)     MCHC 30.5 (*)     RDW 16.4 (*)     Absolute Lymphocytes 0.6 (*)     All other components within normal limits   BASIC METABOLIC PANEL - Abnormal; Notable for the following components:    Chloride 110 (*)     Glucose 100 (*)     Urea Nitrogen 44 (*)     Creatinine 1.43 (*)     GFR Estimate 45 (*)     GFR Estimate If Black 52 (*)     All other components within normal limits   INR - Abnormal; Notable for the following components:    INR 2.35 (*)     All other components within normal limits       Imaging Studies:   Recent Results (from the past 24 hour(s))   XR Pelvis and Hip Left 2 Views    Narrative    Exam: XR PELVIS AND HIP LEFT 2 VIEWS, 3/12/2019 3:31 PM    Indication: pain after fall    Comparison: CT chest/abdomen/pelvis 10/1/2018    Findings:   AP views of the pelvis, AP and crosstable lateral view of the left  hip.    Left intertrochanteric fracture  with superior angulation of the  proximal femoral component and superior migration of the distal  component. Fracture line extends into the midportion of the greater  trochanter. Prominent bone island in the left femoral head,  characterized as benign on the most recent CT. The femoral diaphyseal  cortex is well delineated, likely due to projectional rotation.    Marked arterial vascular calcifications. Pelvic phleboliths. Surgical  sutures in the descending colon.      Impression    Impression:   Intertrochanteric fracture of the left proximal femur. Superior  angulation of the proximal, and superior migration of the distal  femoral components.    I have personally reviewed the examination and initial interpretation  and I agree with the findings.    ASHELY TIRADO MD       Recent vital signs:   /76   Temp 98  F (36.7  C) (Oral)   Resp 16   SpO2 95%       Skin/wound Issues: None    Code Status: Full Code    Pain control: good    Nausea control: pt had none    Abnormal labs/tests/findings requiring intervention: see epic    Family present during ED course? Yes   Family Comments/Social Situation comments: n/a    Tasks needing completion: None      [phone] [facility]

## 2019-03-12 NOTE — PROGRESS NOTES
Social Work: Assessment with Discharge Plan    Patient Name:  Leroy Florence  :  1935  Age:  83 year old  MRN:  [id number]  Risk/Complexity Score:     Completed assessment with: Chart review, patient, patient's significant other, ED MD    Presenting Information   Reason for Referral:  Discharge plan  Date of Intake:  2019  Referral Source:  Chart Review  Decision Maker:  patient  Alternate Decision Maker:  Significant other Rica Levine [phone]  Health Care Directive:  Copy in Chart  Living Situation:  House  Previous Functional Status:  Independent  Patient and family understanding of hospitalization: Fall, femur fracture  Cultural/Language/Spiritual Considerations: Pent[employer]al per demographics  Adjustment to Illness:  Appears comfortable and calm    Physical Health  Reason for Admission:    1. Hip fracture requiring operative repair, left, closed, initial encounter (H)    2. Fall, initial encounter      Services Needed/Recommended:  TCU    Mental Health/Chemical Dependency  Diagnosis:  None reported.  Support/Services in Place:  none  Services Needed/Recommended:  none    Support System  Significant relationship at present time:  Significant other  Family of origin is available for support:  yes  Other support available:  none  Gaps in support system:  Spouse concerned that home may be not be suitable if patient unable to navigate stairs  Patient is caregiver to:  None     Provider Information   Primary Care Physician:  Roberto Sarmiento   [phone]   Clinic:  [address] [place] [id number]      :  -    Financial   Income Source:  Not discussed.  Financial Concerns:  None reported.  Insurance:    Payor/Plan Subscriber Name Rel Member # Group #   UCARE - UCARE MEDICARE BREER,WILLIAM C  [id number]      [address]       Discharge Plan   Patient and family discharge goal:  Anticipate need for TCU  Provided education on discharge plan:  YES  Patient agreeable  to discharge plan:  YES  A list of Medicare Certified Facilities was provided to the patient and/or family to encourage patient choice. Patient's choices for facility are: [facility], [facility]], [facility], [facility], [facility] home  Will NH provide Skilled rehabilitation or complex medical:  YES  General information regarding anticipated insurance coverage and possible out of pocket cost was discussed. Patient and patient's family are aware patient may incur the cost of transportation to the facility, pending insurance payment: YES  Barriers to discharge:  TBD - Pending patient's progress and further recommendation.    Discharge Recommendations   Anticipated Disposition:  Facility:  TCU referrals to be sent  Transportation Needs:  Medical:  Wheelchair  Name of Transportation Company and Phone:  [employer] (Ph: [phone])    Additional comments   SW consulted via chart review given patient's age (83), fall and now hip fracture possibly requiring surgery (ortho consult pending at this time).  ACACIA met with patient, Noe, along with his significant other Rica.  Noe is alert and oriented and conversant.  Collateral information obtained from Rica.    Noe is an 83-year-old  male who resides with his significant other in a [place] home.  He had a recent hospitalization here at [facility] 2/1-2/7/19 and then went home with [facility].  Prior to fall today, both Noe and significant other noted that he was doing well enough that home care closed services last week.  They reside in a 2 level home (3 steps to enter) and bedroom and bathroom on the second level.  He had been ambulating up and down the stairs.    Discussed discharge planning and options.  Anticipate possible surgery so we will need to fully determine needs post surgery.  At this time, significant other Rica expresses significant concern about him coming  back to the home setting.  She notes he would need to be able to ambulate up and down stairs in the homes.  She denies any ability for them to set up a bed on the main level nor use a walker on the main level given the amount of items on the first floor.  She denied any other family friends who would be able to house him post hospitalization.  Noe is agreeable to considering TCU and we discussed choices as noted above.    Plan: TBD - Pending patient's progress and further recommendation.  May need TCU if unable to navigate stairs.  Noe is agreeable to TCU referrals to: [facility], [facility], [facility], [facility].    JOANA Doherty, [facility]  Social Work Services, Emergency Dept [facility]  Pager: [phone] Mon-Sat 9 am - 9 pm, on-call/after hours pager [phone]    This note was created in part by the use of Dragon voice recognition dictation system. Inadvertent grammatical errors and typographical errors may still exist.

## 2019-03-12 NOTE — H&P
[facility], [place]    History and Physical / Consult note: Trauma Service    Date of Admission:  3/12/2019    Time of Admission/Consult Request (page/call): 03/12/2019 @ 15:30  Time of my evaluation: 03/12/19 @ 1545  Consulting services:  Orthopedics -Called by ED    Assessment & Plan   Trauma mechanism: Mechanical fall on icy surface  Time/date of injury: 03/12/19 about 1pm-[place]  Known Injuries:  1. Intertrochanteric fracture of the left proximal femur  Other diagnoses:   1. Acute traumatic pain  2. Acute blood loss on anemia of chronic illness  3. Coagulopathy-On chronic Warfarin therapy, INR is therapeutic @ 2.35  4. Afib/flutter  5. ARSALAN  6. Hx CAD, CABG, PCI with JEREMY  7. AICD  8. Recent open transverse colectomy for colon cancer       Procedure: pending per Orthopedic surgery  Plan:  1. Admit to trauma, Dr. Atkins. Low suspicion for head trauma given no head strike, denies headache, no scalp pain or hematoma on exam. Neurochecks every 4 hours, if concern or change in neuro status can obtain one.   2. Femur fracture: Orthopedic consult  1. Keep NPO for now  2. NWB to the left lower extremity, keep on bedrest  3. MIVF while NPO  4. Hold PTA Warfarin and ASA  3. Acute traumatic pain: PRN Tylenol, Oxycodone and hydromorphone  4. Coagulopathy: INR therapeutic at 2.3, plan to reverse prior to surgery with Vitamin K. Goal INR per Ortho.  5. Anemia: Per chart review he has been in the 7-8 range since his last surgery in February. 7.8 on admission. Anticipate blood loss secondary to trauma and coagulation status. Type and screen ordered, will have a unit on hold for OR.  6. Cardiac issues: States fall was mechanical. Denies any preceding symptoms prior to the fall, denies chest pain or shortness of breath. Will obtain pre-op EKG, electrolytes are stable. Resume betablocker and Statin. Hold ASA.  7. Resp: no acute issues, pulmonary toilet  8. Home meds: Resume Zoloft and home vitamins.    9. FEN/GI: Continue MIVF while NPO. Will also check an albumin and Vit D levels  10. PT/OT post op  11. SW discharge planning  12. Palliative care consult: routine given multiple co-morbidities on recent major trauma  Code status: Full confirmed with patient and wife.   General Cares:  GI Prophylaxis: N/A  DVT Prophylaxis: Mechanical for now  Date of last stool/Bowel Regimen:PTA/ ordered  Pulmonary toilet:IS    ETOH: This patient was asked if in the last 3-6 months there has been a time when he had  5 or more drinks in a single day/outing.. Patient answer to the screening question was in the negative. No intervention needed.  Primary Care Physician   Roberto Sarmiento    Chief Complaint   I fell on ice while taking out the garbage    History is obtained from the patient, electronic health record, emergency department physician and patient's spouse    History of Present Illness   Noe Florence is a 83 year old male who presents with for evaluation of left hip pain after a fall. Mr Florence reports that he was taking out the garbage to the curb which he is not supposed to given his lifting restrictions. He states he slipped on a patch of ice on the driveway and landed on his left side. He developed immediate left hip pain and was unable to ambulate. His wife states she saw him sitting on the ground and called for help.    He denies hitting his head, headache, nausea and any other part of his body. He states he has been recovering well from his recent surgery with no recent URI, diarrhea, chest pain, shortness of breath or other changes in his health. He denies any palpitations, lightheadedness or dizziness.    Past Medical History    I have reviewed this patient's medical history and updated it with pertinent information if needed.   Past Medical History:   Diagnosis Date     Advanced open-angle glaucoma      Antiplatelet or antithrombotic long-term use      Atrial fibrillation (H)      CKD (chronic kidney  disease), stage III (H) 2005     Colon cancer (H)     Stage II-B colon cancer     Coronary artery disease     s/p CABG x 2, JEREMY x 2     Diverticulitis      Hyperlipidemia      Hypertension      Ischemic cardiomyopathy      MGUS (monoclonal gammopathy of unknown significance)      Nonsenile cataract     BE     Pacemaker      Polymorphic ventricular tachycardia (H)      Polymyalgia rheumatica (H)      PVD (posterior vitreous detachment), both eyes 2005     S/P ablation of atrial flutter 6/20/14    CTI     Stented coronary artery      SVT (supraventricular tachycardia) (H)     PPM/AICD for NSVT     Upper leg DVT (deep venous thromboembolism), chronic (H)     Left     Weight loss, unintentional 2017    15 lb in 4 months       Past Surgical History   I have reviewed this patient's surgical history and updated it with pertinent information if needed.  Past Surgical History:   Procedure Laterality Date     C CABG, ARTERY-VEIN, FOUR  2006    LIMA-LAD, SVG-Rt PDA, SVG-OM2, SVG-Diag 1     C CABG, VEIN, SINGLE  1994    1-vessel CABG, SVG->PDRCA      CATARACT IOL, RT/LT Bilateral      COLONOSCOPY  3/13/2014    Procedure: COMBINED COLONOSCOPY, SINGLE BIOPSY/POLYPECTOMY BY BIOPSY;;  Surgeon: Mary Gerber MD;  Location:  GI     COLONOSCOPY N/A 6/22/2015    Procedure: COLONOSCOPY;  Surgeon: Marilin Newman MD;  Location:  GI     COLONOSCOPY N/A 11/7/2018    Procedure: COMBINED COLONOSCOPY, SINGLE OR MULTIPLE BIOPSY/POLYPECTOMY BY BIOPSY;  Surgeon: Roberto Esteban MD;  Location:  GI     EP ICD GENERATOR CHANGE DUAL N/A 12/11/2018    Procedure: EP ICD Generator Change Dual;  Surgeon: Deedee Baird MD;  Location:  HEART CARDIAC CATH LAB     H ABLATION ATRIAL FLUTTER       HEART CATH DRUG ELUTING STENT PLACEMENT  4/19/2012    JEREMY x 2 to LCx     IMPLANT IMPLANTABLE CARDIOVERTER DEFIBRILLATOR  5-    ppm/aicd     KNEE SURGERY      right and left knee surgeries     LAPAROSCOPIC ASSISTED COLECTOMY  Right 2019    Procedure: Laparoscopic Converted to Open Transverse Colectomy with Lysis of Adhesions;  Surgeon: Chanelle Guzmán MD;  Location: [facility] OR     LAPAROSCOPIC ASSISTED COLECTOMY LEFT (DESCENDING)  2014    Procedure: LAPAROSCOPIC ASSISTED COLECTOMY LEFT (DESCENDING);  Hand assisted Laparoscopic Sigmoid Colectomy , Laparoscopic mobilization of spleenic flexure *Latex Allergy*Anesthesia General with Block;  Surgeon: Chanelle Guzmán MD;  Location: [facility] OR     REPAIR VALVE MITRAL  2006     30-mm Medtronic Julian ring      SELECTIVE LASER TRABECULOPLASTY (SLT) OD (RIGHT EYE)  4/10, ,+13    RE     SELECTIVE LASER TRABECULOPLASTY (SLT) OS (LEFT EYE)  2012     SHOULDER SURGERY      right rotator cuff     Prior to Admission Medications   Prior to Admission Medications   Prescriptions Last Dose Informant Patient Reported? Taking?   Blood Pressure Monitor KIT  Self No No   Si Units daily   Cholecalciferol (VITAMIN D-3 PO)  Self Yes No   Sig: Take 1,000 Units by mouth 2 times daily    Multiple Vitamins-Minerals (CENTRUM SILVER) per tablet  Self Yes No   Sig: Take 1 tablet by mouth daily. AM    acetaminophen (TYLENOL) 500 MG tablet   No No   Sig: Take 2 tablets (1,000 mg) by mouth 4 times daily   aspirin 81 MG tablet  Self Yes No   Sig: Take 1 tablet by mouth daily.   atorvastatin (LIPITOR) 40 MG tablet   No No   Sig: Take 1 tablet (40 mg) by mouth daily as directed.   ciclopirox (LOPROX) 0.77 % cream  Self No No   Sig: Apply topically 2 times daily To feet and toenails.   Patient taking differently: Apply topically 2 times daily as needed To feet and toenails.   cyanocobalamin (VITAMIN  B-12) 1000 MCG tablet  Self No No   Sig: Take 2 tablets (2,000 mcg) by mouth daily   Patient taking differently: Take 1,000 mcg by mouth 2 times daily    doxazosin (CARDURA) 2 MG tablet   No No   Sig: Take 1/2 tab in the morning and 1/2 tab in the evening   ketoconazole (NIZORAL) 2 % cream  Self  No No   Sig: Apply topically daily On hold   Patient taking differently: Apply topically daily as needed On hold   loratadine (CLARITIN) 10 MG tablet  Self Yes No   Sig: Take 10 mg by mouth daily as needed    metoprolol tartrate (LOPRESSOR) 25 MG tablet  Self No No   Sig: Take 1 tablet (25 mg) by mouth 2 times daily   mirtazapine (REMERON) 15 MG tablet  Self Yes No   Sig: Take 15 mg by mouth At Bedtime.   order for DME  Self No No   Si-30 mm Hg knee length compression stockings.  Measure and fit.  Style and color per patient preference.  Doff n Aracelis per patient need.   sertraline (ZOLOFT) 100 MG tablet  Self Yes No   Sig: Take 100 mg by mouth every evening.   traMADol (ULTRAM) 50 MG tablet   No No   Sig: Take 1 tablet (50 mg) by mouth every 6 hours as needed for moderate pain   warfarin (COUMADIN) 5 MG tablet  Self No No   Sig: Take 1 tablet by mouth daily or as directed by the Coumadin clinic.   Patient taking differently: As of 2019 - take 5 mg by mouth daily at lunch time   warfarin (COUMADIN) 7.5 MG tablet   No No   Sig: Take 1 tablet (7.5 mg) by mouth once for 1 dose      Facility-Administered Medications: None     Allergies   Allergies   Allergen Reactions     Latex Rash     Rash       Social History   Social History     Socioeconomic History     Marital status:      Spouse name: Not on file     Number of children: Not on file     Years of education: Not on file     Highest education level: Not on file   Occupational History     Not on file   Social Needs     Financial resource strain: Not on file     Food insecurity:     Worry: Not on file     Inability: Not on file     Transportation needs:     Medical: Not on file     Non-medical: Not on file   Tobacco Use     Smoking status: Former Smoker     Types: Cigarettes, Cigars     Last attempt to quit: 1968     Years since quittin.2     Smokeless tobacco: Never Used   Substance and Sexual Activity     Alcohol use: Yes      Alcohol/week: 0.0 oz     Comment: 2-3 drinks twice weekly     Drug use: No     Sexual activity: Not on file   Lifestyle     Physical activity:     Days per week: Not on file     Minutes per session: Not on file     Stress: Not on file   Relationships     Social connections:     Talks on phone: Not on file     Gets together: Not on file     Attends [employer] service: Not on file     Active member of club or organization: Not on file     Attends meetings of clubs or organizations: Not on file     Relationship status: Not on file     Intimate partner violence:     Fear of current or ex partner: Not on file     Emotionally abused: Not on file     Physically abused: Not on file     Forced sexual activity: Not on file   Other Topics Concern     Parent/sibling w/ CABG, MI or angioplasty before 65F 55M? Not Asked   Social History Narrative     Not on file       Family History   Family history reviewed with patient and is noncontributory.    Review of Systems   CONSTITUTIONAL: No fever, chills, sweats, fatigue   EYES: no visual blurring, no double vision or visual loss  ENT: no decrease in hearing, no tinnitus, no vertigo, no hoarseness  RESPIRATORY: no shortness of breath, no cough, no sputum   CARDIOVASCULAR: no palpitations, no chest  pain, no exertional chest pain or pressure  GASTROINTESTINAL: no nausea or vomiting, or abd pain  GENITOURINARY: no dysuria, no frequency or hesitancy, no hematuria  MUSCULOSKELETAL: no weakness, no redness, no swelling, no joint pain,   SKIN: no rashes, ecchymoses, abrasions or lacerations  NEUROLOGIC: no numbness or tingling of hands, no numbness or tingling  of feet, no syncope, no tremors or weakness  PSYCHIATRIC: no sleep disturbances, no anxiety or depression    Physical Exam   Temp: 98  F (36.7  C) Temp src: Oral BP: 129/76   Heart Rate: 100 Resp: 16 SpO2: 95 % O2 Device: None (Room air)    Vital Signs with Ranges  Temp:  [98  F (36.7  C)] 98  F (36.7  C)  Heart Rate:  [100]  100  Resp:  [16] 16  BP: (129)/(76) 129/76  SpO2:  [95 %] 95 % 0 lbs 0 oz    Primary Survey:  Airway: patient talking  Breathing: symmetric respiratory effort bilaterally  Circulation: central pulses present and peripheral pulses present  Disability: Pupils - left 4 mm and brisk, right 4 mm and brisk     [place] Coma Scale - Total 15/15  Eye Response (E): 4  4= spontaneous,  3= to verbal/voice, 2=  to pain, 1= No response   Verbal Response (V): 5   5= Orientated, converses,  4= Confused, converses, 3= Inappropriate words,  2= Incomprehensible sounds,  1=No response   Motor Response (M): 6   6= Obeys commands, 5= Localizes to pain, 4= Withdrawal to pain, 3=Fexion to pain, 2= Extension to pain, 1= No response    Secondary Survey:  General: alert, oriented to person, place, time  Neuro: PERRLA. EOMI. CN II-XII grossly intact. No focal deficits. Strength 5/5 x 3extremities. Deferred assessment of the left lower extremity. Sensation intact.  Head: atraumatic, normocephalic, trachea midline  Eyes:  Pupils 3mm, EOMI,   Mouth/Throat: no exudates or erythema,  no dental tenderness or malocclusions, no tongue lacerations  Neck:  no cervical collar present. No midline posterior tenderness, full AROM without pain.   Chest/Pulmonary: normal respiratory rate and rhythm,  bilateral clear breath sounds, no wheezes, rales or rhonchi, no chest wall tenderness or deformities,   Cardiovascular: S1, S2,  normal and regular rate and rhythm, no murmurs  Abdomen: soft, flat an non tender. Old surgical scars are well healing appropraitely  : normal external genitalia, pelvis stable to lateral compression, no mo, no urine assess  Musculoskel/Extremities: Left lower extremity is shortened and externally rotated.   Has full AROM of the other major joints without tenderness. Edema of bilateral extremities appear chronic however L>R  Back/Spine: Back and spine assessment deffered  Hands: no gross deformities of hands or fingers. Full AROM  of hand and fingers in flexion and extension.  strength equal and symmetric.   Psychiatric: affect/mood normal, cooperative, normal judgement/insight and memory intact  Skin: no rashes, laceration, ecchymosis, skin warm and dry.     Results for orders placed or performed during the hospital encounter of 03/12/19 (from the past 24 hour(s))   CBC with platelets differential   Result Value Ref Range    WBC 7.9 4.0 - 11.0 10e9/L    RBC Count 2.79 (L) 4.4 - 5.9 10e12/L    Hemoglobin 7.8 (L) 13.3 - 17.7 g/dL    Hematocrit 25.6 (L) 40.0 - 53.0 %    MCV 92 78 - 100 fl    MCH 28.0 26.5 - 33.0 pg    MCHC 30.5 (L) 31.5 - 36.5 g/dL    RDW 16.4 (H) 10.0 - 15.0 %    Platelet Count 236 150 - 450 10e9/L    Diff Method Automated Method     % Neutrophils 84.1 %    % Lymphocytes 6.9 %    % Monocytes 6.4 %    % Eosinophils 2.0 %    % Basophils 0.3 %    % Immature Granulocytes 0.3 %    Nucleated RBCs 0 0 /100    Absolute Neutrophil 6.7 1.6 - 8.3 10e9/L    Absolute Lymphocytes 0.6 (L) 0.8 - 5.3 10e9/L    Absolute Monocytes 0.5 0.0 - 1.3 10e9/L    Absolute Eosinophils 0.2 0.0 - 0.7 10e9/L    Absolute Basophils 0.0 0.0 - 0.2 10e9/L    Abs Immature Granulocytes 0.0 0 - 0.4 10e9/L    Absolute Nucleated RBC 0.0    Basic metabolic panel   Result Value Ref Range    Sodium 143 133 - 144 mmol/L    Potassium 4.5 3.4 - 5.3 mmol/L    Chloride 110 (H) 94 - 109 mmol/L    Carbon Dioxide 23 20 - 32 mmol/L    Anion Gap 9 3 - 14 mmol/L    Glucose 100 (H) 70 - 99 mg/dL    Urea Nitrogen 44 (H) 7 - 30 mg/dL    Creatinine 1.43 (H) 0.66 - 1.25 mg/dL    GFR Estimate 45 (L) >60 mL/min/[1.73_m2]    GFR Estimate If Black 52 (L) >60 mL/min/[1.73_m2]    Calcium 8.6 8.5 - 10.1 mg/dL   INR   Result Value Ref Range    INR 2.35 (H) 0.86 - 1.14   XR Pelvis and Hip Left 2 Views    Narrative    Exam: XR PELVIS AND HIP LEFT 2 VIEWS, 3/12/2019 3:31 PM    Indication: pain after fall    Comparison: CT chest/abdomen/pelvis 10/1/2018    Findings:   AP views of the pelvis, AP  and crosstable lateral view of the left  hip.    Left intertrochanteric fracture with superior angulation of the  proximal femoral component and superior migration of the distal  component. Fracture line extends into the midportion of the greater  trochanter. Prominent bone island in the left femoral head,  characterized as benign on the most recent CT. The femoral diaphyseal  cortex is well delineated, likely due to projectional rotation.    Marked arterial vascular calcifications. Pelvic phleboliths. Surgical  sutures in the descending colon.      Impression    Impression:   Intertrochanteric fracture of the left proximal femur. Superior  angulation of the proximal, and superior migration of the distal  femoral components.    I have personally reviewed the examination and initial interpretation  and I agree with the findings.    ASHELY TIRADO MD     *Note: Due to a large number of results and/or encounters for the requested time period, some results have not been displayed. A complete set of results can be found in Results Review.       Studies:  XR Pelvis and Hip Left 2 Views   Final Result   Impression:    Intertrochanteric fracture of the left proximal femur. Superior   angulation of the proximal, and superior migration of the distal   femoral components.      I have personally reviewed the examination and initial interpretation   and I agree with the findings.      MD Sharonda RUVALCABA CNP

## 2019-03-13 ENCOUNTER — ANESTHESIA EVENT (OUTPATIENT)
Dept: SURGERY | Facility: CLINIC | Age: 84
DRG: 481 | End: 2019-03-13
Payer: COMMERCIAL

## 2019-03-13 ENCOUNTER — ANESTHESIA (OUTPATIENT)
Dept: SURGERY | Facility: CLINIC | Age: 84
DRG: 481 | End: 2019-03-13
Payer: COMMERCIAL

## 2019-03-13 LAB
ALBUMIN SERPL-MCNC: 2.9 G/DL (ref 3.4–5)
ANION GAP SERPL CALCULATED.3IONS-SCNC: 9 MMOL/L (ref 3–14)
BLD PROD TYP BPU: NORMAL
BLD UNIT ID BPU: 0
BLOOD PRODUCT CODE: NORMAL
BPU ID: NORMAL
BUN SERPL-MCNC: 38 MG/DL (ref 7–30)
CALCIUM SERPL-MCNC: 8.1 MG/DL (ref 8.5–10.1)
CHLORIDE SERPL-SCNC: 112 MMOL/L (ref 94–109)
CO2 SERPL-SCNC: 23 MMOL/L (ref 20–32)
CREAT SERPL-MCNC: 1.28 MG/DL (ref 0.66–1.25)
ERYTHROCYTE [DISTWIDTH] IN BLOOD BY AUTOMATED COUNT: 16.2 % (ref 10–15)
GFR SERPL CREATININE-BSD FRML MDRD: 51 ML/MIN/{1.73_M2}
GLUCOSE SERPL-MCNC: 111 MG/DL (ref 70–99)
HCT VFR BLD AUTO: 24.9 % (ref 40–53)
HGB BLD-MCNC: 7.8 G/DL (ref 13.3–17.7)
INR PPP: 1.59 (ref 0.86–1.14)
INR PPP: 1.67 (ref 0.86–1.14)
INR PPP: 1.81 (ref 0.86–1.14)
MAGNESIUM SERPL-MCNC: 2.2 MG/DL (ref 1.6–2.3)
MCH RBC QN AUTO: 28.3 PG (ref 26.5–33)
MCHC RBC AUTO-ENTMCNC: 31.3 G/DL (ref 31.5–36.5)
MCV RBC AUTO: 90 FL (ref 78–100)
PHOSPHATE SERPL-MCNC: 3.2 MG/DL (ref 2.5–4.5)
PLATELET # BLD AUTO: 206 10E9/L (ref 150–450)
POTASSIUM SERPL-SCNC: 4.3 MMOL/L (ref 3.4–5.3)
RBC # BLD AUTO: 2.76 10E12/L (ref 4.4–5.9)
SODIUM SERPL-SCNC: 144 MMOL/L (ref 133–144)
TRANSFUSION STATUS PATIENT QL: NORMAL
TRANSFUSION STATUS PATIENT QL: NORMAL
WBC # BLD AUTO: 8 10E9/L (ref 4–11)

## 2019-03-13 PROCEDURE — 85610 PROTHROMBIN TIME: CPT | Performed by: STUDENT IN AN ORGANIZED HEALTH CARE EDUCATION/TRAINING PROGRAM

## 2019-03-13 PROCEDURE — P9016 RBC LEUKOCYTES REDUCED: HCPCS | Performed by: EMERGENCY MEDICINE

## 2019-03-13 PROCEDURE — 25000128 H RX IP 250 OP 636: Performed by: NURSE PRACTITIONER

## 2019-03-13 PROCEDURE — 25800030 ZZH RX IP 258 OP 636: Performed by: NURSE PRACTITIONER

## 2019-03-13 PROCEDURE — 99232 SBSQ HOSP IP/OBS MODERATE 35: CPT | Performed by: NURSE PRACTITIONER

## 2019-03-13 PROCEDURE — 36415 COLL VENOUS BLD VENIPUNCTURE: CPT | Performed by: NURSE PRACTITIONER

## 2019-03-13 PROCEDURE — 12000001 ZZH R&B MED SURG/OB UMMC

## 2019-03-13 PROCEDURE — 25800030 ZZH RX IP 258 OP 636: Performed by: EMERGENCY MEDICINE

## 2019-03-13 PROCEDURE — 85027 COMPLETE CBC AUTOMATED: CPT | Performed by: NURSE PRACTITIONER

## 2019-03-13 PROCEDURE — 85610 PROTHROMBIN TIME: CPT | Performed by: NURSE PRACTITIONER

## 2019-03-13 PROCEDURE — 99232 SBSQ HOSP IP/OBS MODERATE 35: CPT | Performed by: INTERNAL MEDICINE

## 2019-03-13 PROCEDURE — 36415 COLL VENOUS BLD VENIPUNCTURE: CPT | Performed by: STUDENT IN AN ORGANIZED HEALTH CARE EDUCATION/TRAINING PROGRAM

## 2019-03-13 PROCEDURE — 80069 RENAL FUNCTION PANEL: CPT | Performed by: NURSE PRACTITIONER

## 2019-03-13 PROCEDURE — 83735 ASSAY OF MAGNESIUM: CPT | Performed by: NURSE PRACTITIONER

## 2019-03-13 PROCEDURE — 25000132 ZZH RX MED GY IP 250 OP 250 PS 637: Performed by: NURSE PRACTITIONER

## 2019-03-13 RX ADMIN — ACETAMINOPHEN 975 MG: 325 TABLET, FILM COATED ORAL at 21:54

## 2019-03-13 RX ADMIN — CALCIUM 500 MG: 500 TABLET ORAL at 08:19

## 2019-03-13 RX ADMIN — MULTIPLE VITAMINS W/ MINERALS TAB 1 TABLET: TAB at 08:18

## 2019-03-13 RX ADMIN — ACETAMINOPHEN 975 MG: 325 TABLET, FILM COATED ORAL at 02:40

## 2019-03-13 RX ADMIN — Medication 0.2 MG: at 16:48

## 2019-03-13 RX ADMIN — SODIUM CHLORIDE: 9 INJECTION, SOLUTION INTRAVENOUS at 04:41

## 2019-03-13 RX ADMIN — METOPROLOL TARTRATE 25 MG: 25 TABLET ORAL at 08:19

## 2019-03-13 RX ADMIN — VITAMIN D, TAB 1000IU (100/BT) 1000 UNITS: 25 TAB at 21:53

## 2019-03-13 RX ADMIN — LIDOCAINE 1 PATCH: 560 PATCH PERCUTANEOUS; TOPICAL; TRANSDERMAL at 08:20

## 2019-03-13 RX ADMIN — METHOCARBAMOL 750 MG: 750 TABLET, FILM COATED ORAL at 15:46

## 2019-03-13 RX ADMIN — ATORVASTATIN CALCIUM 40 MG: 40 TABLET, FILM COATED ORAL at 08:19

## 2019-03-13 RX ADMIN — SODIUM CHLORIDE: 9 INJECTION, SOLUTION INTRAVENOUS at 21:52

## 2019-03-13 RX ADMIN — MIRTAZAPINE 15 MG: 15 TABLET, FILM COATED ORAL at 21:53

## 2019-03-13 RX ADMIN — ACETAMINOPHEN 975 MG: 325 TABLET, FILM COATED ORAL at 08:17

## 2019-03-13 RX ADMIN — CYANOCOBALAMIN TAB 1000 MCG 1000 MCG: 1000 TAB at 21:53

## 2019-03-13 RX ADMIN — POLYETHYLENE GLYCOL 3350 17 G: 17 POWDER, FOR SOLUTION ORAL at 08:18

## 2019-03-13 RX ADMIN — METHOCARBAMOL 750 MG: 750 TABLET, FILM COATED ORAL at 22:12

## 2019-03-13 RX ADMIN — CALCIUM 500 MG: 500 TABLET ORAL at 18:43

## 2019-03-13 RX ADMIN — VITAMIN D, TAB 1000IU (100/BT) 1000 UNITS: 25 TAB at 08:18

## 2019-03-13 RX ADMIN — CYANOCOBALAMIN TAB 1000 MCG 1000 MCG: 1000 TAB at 08:18

## 2019-03-13 RX ADMIN — PHYTONADIONE 5 MG: 10 INJECTION, EMULSION INTRAMUSCULAR; INTRAVENOUS; SUBCUTANEOUS at 08:17

## 2019-03-13 RX ADMIN — SERTRALINE HYDROCHLORIDE 100 MG: 100 TABLET ORAL at 21:52

## 2019-03-13 RX ADMIN — ACETAMINOPHEN 975 MG: 325 TABLET, FILM COATED ORAL at 14:29

## 2019-03-13 ASSESSMENT — ACTIVITIES OF DAILY LIVING (ADL)
ADLS_ACUITY_SCORE: 18

## 2019-03-13 NOTE — PROGRESS NOTES
[facility], [place]  Tertiary Survey Progress Note     Date of Service (when I saw the patient): 03/13/2019     Assessment & Plan   Trauma mechanism: Mechanical fall on icy surface  Time/date of injury: 03/12/19 about 1pm-[place]  Known Injuries:  1. Intertrochanteric fracture of the left proximal femur  Other diagnoses:   1. Acute traumatic pain  2. Acute blood loss on anemia of chronic illness  3. Coagulopathy-On chronic Warfarin therapy  4. Afib/flutter  5. ARSALAN  6. Hx CAD, CABG, PCI with JEREMY  7. AICD  8. Recent open transverse colectomy for colon cancer  02/19        Procedure: pending per Orthopedic surgery  Plan:  1. Tertiary exam completed today, negative for other trauma.  2. Femur fracture: Orthopedic consult, plan for OR this afternoon  1. Keep NPO for now  2. NWB to the left lower extremity, keep on bedrest  3. MIVF while NPO  4. Hold PTA Warfarin and ASA  5. Appreciate medicine clearance for OR today  3. Acute traumatic pain: PRN Tylenol, Oxycodone and hydromorphone  4. Coagulopathy: INR therapeutic at 2.3, Vitamin K 5mg x 2 doses given. Goal INR pre-op <1.5. Recheck pending.  5. Anemia: Per chart review he has been in the 7-8 range since his last surgery in February. 7.8 on admission. Anticipate blood loss secondary to trauma and coagulation status. Receive 1 unit PRBC yesterday per Ortho request, hbg 7.8 today. Hemodynamically stable. Left thigh and lower extremity with chronic edema, possibly increased increase trauma. No concern for compartment syndrome. +2 pulses palpable distal pulses. +CMS.  1. Plan to trend post-op  2. Will have a unit on hold for OR  6. Cardiac issues:  Denies any preceding symptoms prior to the fall, denies chest pain, palpitations or shortness of breath. Pre-op EKG obtained.  1. Resumed betablocker and Statin.  2. Hold ASA. and Cardura until post op  7. Resp: no acute issues,routine pulmonary toilet  8. Home meds: Resume Zoloft and home vitamins.    9. FEN/GI:   1. ARSALAN resolving with IV hydration, monitor  2. Continue MIVF while NPO.   3. Albumin and Vit D levels pending  10. PT/OT post op  11. SW discharge planning  12. Palliative care consult: routine given multiple co-morbidities on recent major trauma    General Cares:    PPI/H2 blocker:  n/a   DVT prophylaxis: Mechanical until post op   Bowel Regimen/Date of last stool: 03/12, ordered   Pulmonary toilet: IS   ETOH screen completed 03/12   Lines / drains: PIV    Code status:  Full code     Discharge goals:     Adequate pain management: Ongoing    VSS x24 hours: ongoing    Hemoglobin stable x 48 hours: ongoing    Ambulating safely and/or therapy evals complete: pending post op    Drains/lines removed or plan in place to manage: yes    Teaching done: ongong    Other:  Expected D/C date: 1-2 days post op    Interval History   Reports minimal left hip pain, uncomfortable with movement. Denies any other pain, shortness or breath or numbness/tingling of the extremities    Review Of Systems  Skin: Chronic discoloration of bilateral lower extremities  Eyes: negative  Ears/Nose/Throat: negative  Respiratory: No shortness of breath, dyspnea on exertion, cough, or hemoptysis  Cardiovascular: negative  Gastrointestinal: negative  Genitourinary: negative  Musculoskeletal: left hip pain with activity  Neurologic: negative  Psychiatric: negative  Hematologic/Lymphatic/Immunologic: negative  Endocrine: negative     Physical Exam     [place] Coma Scale - Total 15/15  Eye Response (E): 4   4= spontaneous, 3= to verbal/voice, 2= to pain, 1= No response   Verbal Response (V): 5   5= Orientated, converses, 4= Confused, converses, 3= Inappropriate words, 2= Incomprehensible sounds, 1=No response   Motor Response (M): 6   6= Obeys commands, 5= Localizes to pain, 4= Withdrawal to pain, 3=Fexion to pain, 2= Extension to pain, 1= No response   Physical Exam   Constitutional: He is oriented to person, place, and time. No distress.    HENT:   Head: Normocephalic and atraumatic.   Eyes: Pupils are equal, round, and reactive to light.   Neck: Normal range of motion.   Cardiovascular: Normal rate and intact distal pulses.   Pulmonary/Chest: Effort normal and breath sounds normal. No respiratory distress. He has no wheezes.   Abdominal: Soft. Bowel sounds are normal. He exhibits no distension. There is no tenderness.   Musculoskeletal: He exhibits tenderness and deformity.   Left lower extremity is shortened and externally rotated   Neurological: He is alert and oriented to person, place, and time.   Skin: Skin is warm. Capillary refill takes less than 2 seconds. He is not diaphoretic.   Brady discoloration of the left lower extremity   Psychiatric: He has a normal mood and affect. His behavior is normal.       Temp: 96.5  F (35.8  C) Temp src: Oral BP: 126/75   Heart Rate: 107 Resp: 16 SpO2: 98 % O2 Device: None (Room air)    There were no vitals filed for this visit.  Vital Signs with Ranges  Temp:  [96.2  F (35.7  C)-98  F (36.7  C)] 96.5  F (35.8  C)  Heart Rate:  [] 107  Resp:  [16] 16  BP: (100-129)/(61-76) 126/75  SpO2:  [95 %-99 %] 98 %  I/O last 3 completed shifts:  In: 954.58 [P.O.:60; I.V.:591.25]  Out: 600 [Urine:600]      MARIA LUISA Leonard CNP  To contact the trauma service use job code pager [phone],   Numeric texts or alpha text through [facility]

## 2019-03-13 NOTE — PROGRESS NOTES
[facility], [place]  Trauma Education Note    Date of Service: 03/13/2019     Trauma mechanism: Mechanical fall on icy surface  Time/date of injury: 03/12/19 about 1pm-[place]  Known Injuries:  1. Intertrochanteric fracture of the left proximal femur    Assessment:    Education Needs   Pulmonary Hygiene  Fall prevention in the home    Primary Learner:Patient    Barriers: No barrier    Learning Style:listening, seeing and hands-on practice     Plan:    Education provided:   Pulmonary Hygiene - Patient was given demonstration on model IS andprovided with own IS by writer at educational round.  Patient able to repeat back appropriate usage with a max volume of 1800ml. (Charted in flowsheets).  Patient verbalized expected frequency is 5-10 times an hour; verbalized rationale for pulmonary hygiene exercises.  Fall prevention in the home - Discussed with patient fall reduction techniques.  Patient states many methods are already in place attempting to dismiss the conversation.  Topic rephrased as 'methods to continue to maintain independence by preventing a future fall' patient engaged in conversation in a slightly increased manner.  Patient verbalized willingness to pass along fall prevention checklist to support system.    Handouts provided: Fall prevention in the home; [place] home medical equipment; [facility]; Aging and Eyes.  Follow up Education Needs: Updates on plan of care - timing of surgery    Matt Rodriguez

## 2019-03-13 NOTE — PLAN OF CARE
/64   Temp 97.7  F (36.5  C) (Oral)   Resp 16   SpO2 97%   3607-4080    Pt up from ED with L femur fracture. VSS on RA. INR 2.35 holding warfarin until surgery. C/o dull aching pain in L hip that is radiating to the front of his leg. Scheduled tylenol relieved most pain. Good appetite on regular diet, switching to NPO at midnight.  RPIV infusing with 75mL NS. Voided 200mL during shift. No BM, pm senna dose given. Bruising and edema on lower extremities. C/o numbness and tingling in LLE and hx of foot drop. Pt is on bedrest with no weight bearing. Plan to infuse 1u RBCs after consent given. Continue to monitor.

## 2019-03-13 NOTE — CONSULTS
[facility] Orthopedic Consultation    Noe Florence MRN# [id number]   Age: 83 year old YOB: 1935     Date of Admission:  3/12/2019    Reason for consult: Left Intertrochanteric fracture                          Impression and Recommendation (Resident / Clinician):   Impression:  82yo male community ambulator with intermittent cane use at baseline with CAD, Afib on warfarin and with pacemaker in place with left, displaced intertrochanteric femur fracture.      Recomendations:  I discussed both operative and non operative treatment with the patient and discussed the risks of non operative treatment being: immobility, predisposition to pressure ulcers, pneumonia, UTI. The risks of surgery being blood loss, malunion, non union, revision surgery, postoperative delirium. He agrees to receive pre op blood transfusion due to Hgb of 7.8 and anticipated blood loss from fracture and surgery.  I also discussed that despite surgery's ability to increase earlier mobility, he would likely still have 1 level decrease in his functional capacity and may need to use a cane full time and intermittent walker. He will likely require skilled nursing facility after hospital stay to transition back to home.     He wishes to proceed with surgery in order to restore his mobility. We will plan for left hip IM nail tomorrow with Dr. Avalos.  -Received 5mg IV vitamin K. Repeat INR ordered for 04:00. Will repeat 5mg IV Vitamin K if INR not <1.5 on am blood draw.   -Bedrest.   -NPO at midnight  -Continue IV fluids and monitor Cr closely due to ARSALAN.   -Administer 1U PRBC tonight.  -He has been cleared by internal medicine.   -Will need pacemaker eval pre op. Spoke with anesthesiologist on call and pre op nurse, CRNA or anesthesiologist will call Pacemaker nurse to evaluate as needed.    -Full femur XR complete  -Informed consent signed and in chart.             Chief Complaint:   Left hip pain            History of Present  Illness (Resident / Clinician):   This patient is a 83 year old male with a significant past medical history of anemia and coronary artery disease and prior colectomy with afib on Warfarin who presents with left, displaced intertrochanteric femur fracture after a ground level fall after slipping on ice while getting ready to go to an appointment.     After falling he had severe pain and inability to bear weight and was brought via ambulance to [facility]. He denies head trauma, LOC, denies numbness or tingling. He states he has baseline left foot drop of unknown etiology.              Past Medical History:     Past Medical History:   Diagnosis Date     Advanced open-angle glaucoma      Antiplatelet or antithrombotic long-term use      Atrial fibrillation (H)      CKD (chronic kidney disease), stage III (H) 2005     Colon cancer (H)     Stage II-B colon cancer     Coronary artery disease     s/p CABG x 2, JEREMY x 2     Diverticulitis      Hyperlipidemia      Hypertension      Ischemic cardiomyopathy      MGUS (monoclonal gammopathy of unknown significance)      Nonsenile cataract     BE     Pacemaker      Polymorphic ventricular tachycardia (H)      Polymyalgia rheumatica (H)      PVD (posterior vitreous detachment), both eyes 2005     S/P ablation of atrial flutter 6/20/14    CTI     Stented coronary artery      SVT (supraventricular tachycardia) (H)     PPM/AICD for NSVT     Upper leg DVT (deep venous thromboembolism), chronic (H)     Left     Weight loss, unintentional 2017    15 lb in 4 months             Past Surgical History:     Past Surgical History:   Procedure Laterality Date     C CABG, ARTERY-VEIN, FOUR  2006    LIMA-LAD, SVG-Rt PDA, SVG-OM2, SVG-Diag 1     C CABG, VEIN, SINGLE  1994    1-vessel CABG, SVG->PDRCA      CATARACT IOL, RT/LT Bilateral      COLONOSCOPY  3/13/2014    Procedure: COMBINED COLONOSCOPY, SINGLE BIOPSY/POLYPECTOMY BY BIOPSY;;  Surgeon: Mary Gerber MD;  Location:  GI      COLONOSCOPY N/A 2015    Procedure: COLONOSCOPY;  Surgeon: Marilin Newman MD;  Location: [facility]     COLONOSCOPY N/A 2018    Procedure: COMBINED COLONOSCOPY, SINGLE OR MULTIPLE BIOPSY/POLYPECTOMY BY BIOPSY;  Surgeon: Roberto Esteban MD;  Location: [facility] GI     EP ICD GENERATOR CHANGE DUAL N/A 2018    Procedure: EP ICD Generator Change Dual;  Surgeon: Deedee Baird MD;  Location:  HEART CARDIAC CATH LAB     H ABLATION ATRIAL FLUTTER       HEART CATH DRUG ELUTING STENT PLACEMENT  2012    JEREMY x 2 to LCx     IMPLANT IMPLANTABLE CARDIOVERTER DEFIBRILLATOR  2012    ppm/aicd     KNEE SURGERY      right and left knee surgeries     LAPAROSCOPIC ASSISTED COLECTOMY Right 2019    Procedure: Laparoscopic Converted to Open Transverse Colectomy with Lysis of Adhesions;  Surgeon: Chanelle Guzmán MD;  Location:  OR     LAPAROSCOPIC ASSISTED COLECTOMY LEFT (DESCENDING)  2014    Procedure: LAPAROSCOPIC ASSISTED COLECTOMY LEFT (DESCENDING);  Hand assisted Laparoscopic Sigmoid Colectomy , Laparoscopic mobilization of spleenic flexure *Latex Allergy*Anesthesia General with Block;  Surgeon: Chanelle Guzmán MD;  Location:  OR     REPAIR VALVE MITRAL  2006     30-mm Medtronic Julian ring      SELECTIVE LASER TRABECULOPLASTY (SLT) OD (RIGHT EYE)  4/10, ,+13    RE     SELECTIVE LASER TRABECULOPLASTY (SLT) OS (LEFT EYE)  2012     SHOULDER SURGERY      right rotator cuff             Social History:     Social History     Tobacco Use     Smoking status: Former Smoker     Types: Cigarettes, Cigars     Last attempt to quit: 1968     Years since quittin.2     Smokeless tobacco: Never Used   Substance Use Topics     Alcohol use: Yes     Alcohol/week: 0.0 oz     Comment: 2-3 drinks twice weekly             Family History:   No  family history of anesthesia, bleeding or clotting complications.          Immunizations:   Immunizations are up to date           Allergies:     Allergies   Allergen Reactions     Latex Rash     Rash             Medications:     Current Facility-Administered Medications   Medication     acetaminophen (TYLENOL) tablet 975 mg     [START ON 3/13/2019] atorvastatin (LIPITOR) tablet 40 mg     bisacodyl (DULCOLAX) Suppository 10 mg     [START ON 3/13/2019] calcium carbonate 500 mg (elemental) (OSCAL;OYSTER SHELL CALCIUM) tablet 500 mg     [START ON 3/13/2019] cyanocobalamin (VITAMIN B-12) tablet 1,000 mcg     HOLD: warfarin (COUMADIN) therapy     HYDROmorphone (DILAUDID) injection 0.2 mg     [START ON 3/13/2019] Lidocaine (LIDOCARE) 4 % Patch 1 patch     [START ON 3/13/2019] lidocaine patch in PLACE     [START ON 3/13/2019] lidocaine patch REMOVAL     magnesium sulfate 4 g in 100 mL sterile water (premade)     methocarbamol (ROBAXIN) tablet 750 mg     metoprolol tartrate (LOPRESSOR) tablet 25 mg     mirtazapine (REMERON) tablet 15 mg     [START ON 3/13/2019] multivitamin w/minerals (THERA-VIT-M) tablet 1 tablet     naloxone (NARCAN) injection 0.1-0.4 mg     ondansetron (ZOFRAN-ODT) ODT tab 4 mg    Or     ondansetron (ZOFRAN) injection 4 mg     oxyCODONE IR (ROXICODONE) half-tab 2.5-5 mg     [START ON 3/13/2019] polyethylene glycol (MIRALAX/GLYCOLAX) Packet 17 g     potassium chloride (KLOR-CON) Packet 20-40 mEq     potassium chloride 10 mEq in 100 mL intermittent infusion with 10 mg lidocaine     potassium chloride 10 mEq in 100 mL sterile water intermittent infusion (premix)     potassium chloride ER (K-DUR/KLOR-CON M) CR tablet 20-40 mEq     prochlorperazine (COMPAZINE) injection 5 mg    Or     prochlorperazine (COMPAZINE) tablet 5 mg    Or     prochlorperazine (COMPAZINE) Suppository 12.5 mg     senna-docusate (SENOKOT-S/PERICOLACE) 8.6-50 MG per tablet 1-2 tablet     sertraline (ZOLOFT) tablet 100 mg     sodium chloride 0.9% infusion     [START ON 3/13/2019] vitamin D3 (CHOLECALCIFEROL) 1000 units (25 mcg) tablet 1,000 Units      Facility-Administered Medications Ordered in Other Encounters   Medication     glycopyrrolate (ROBINUL) injection     neostigmine (PROSTIGMINE) injection             Review of Systems:   Constitutional: Negative for fever.   Respiratory: Negative for shortness of breath.   Cardiovascular: Negative for chest pain.   Gastrointestinal: Negative for abdominal pain.   Musculoskeletal: As per HPI.  All other systems reviewed and are negative.         Physical Exam:     Patient Vitals for the past 24 hrs:   BP Temp Temp src Heart Rate Resp SpO2   19 1919 115/64 97.7  F (36.5  C) Oral 93 16 97 %   19 1700 -- -- -- 92 16 98 %   19 1434 -- 98  F (36.7  C) Oral -- -- --   19 1421 129/76 -- -- 100 16 95 %       General: awake, alert, cooperative, no apparent distress, appears stated age  HEENT: normocephalic, atraumatic.  Respiratory: breathing non-labored, no wheezing  Cardiovascular: peripheral pulses 2+ and symmetric, capillary refill < 2sec, skin warm an well perfused, no edema  Neurological: A&Ox3, CN II-XII grossly intact, strength 5/5 throughout, SILT, no focal abnormalities  Musculoskeletal:  Bilateral lower extremities:    LLE shortened and externally rotated. No lesions or abrasions on left hip. ROM not assessed due to fracture. SILT sural, saphenous, tibial, superficial/deep peroneal nerve distributions. Fires TA/GSC with 5/5 strength on right, Fires TA with 3/5 strength on left due to baseline foot drop, unable to fire EHL, 5/5 strenght gastroc. 1+ DP pulses.            Data:   I have reviewed all lab results.    XR pelvis and XR full femur: Left, displaced introchanteric femur fracture. No diaphyseal lesions.     Yloanda Shay MD  PGY-5  [phone]

## 2019-03-13 NOTE — PROGRESS NOTES
"SPIRITUAL HEALTH SERVICES  SPIRITUAL ASSESSMENT Progress Note  [facility] ([place] 7B   ON-CALL VISIT    REFERRAL SOURCE: Hospital  request upon admission    I met with Sha (Noe) and we explored his accident narrative, \"I guess when you are older, you are more accident prone, especially with the icy conditions.\" He identified his main goal of care with his \"broken femur\" is to \"live on my own, like I was before.\" He is Taoist and spent 40 years at the [facility] and then the last 20 years at the [facility]. He asked for a prayer before his surgery, which I offered, and we closed with the \"Our Father.\"     PLAN: I will notify the Palliative Care  of my visit. [facility] remains available for ongoing support for the duration of patient's hospitalization.    Kayla Edmondson  Chaplain Resident  Pager [phone]      "

## 2019-03-13 NOTE — PLAN OF CARE
Blood pressure 134/67, pulse 70, temperature 96.8  F (36  C), temperature source Oral, resp. rate 16, SpO2 98 %.     Neuro: Alert and oriented x 4.     GI/: WDL. Had BM x1. Voiding using urinal.    Diet: NPO for surgery today to repair left hip fx.    Incisions/Drains: None.    IV Access: PIV at 75cc per hr.    Labs: hgb= 7.8, INR=1.81 had 5 mg of iv vit-k recheck = 1.67- 1.59 @ 1300.     Activity: Bed rest.     Pain: C/o left hip pain with movement. Declined pain oxycodone and Robaxin. Had scheduled tylenol and Lido patch.    New changes this shift: Stable. None.    Plan: OR today around 1600. NPO for OR. Pacemaker nurse updated #[id number] @ 1837 regarding plans for OR. Pt has a pacemaker. Nurse will meet pt in 3C when he is transferred to pre-op.

## 2019-03-13 NOTE — PHARMACY-ADMISSION MEDICATION HISTORY
"Admission medication history interview status for the 3/12/2019 admission is complete. See Epic admission navigator for allergy information, pharmacy, prior to admission medications and immunization status.     Medication history interview sources:  Noe, Chart Review, Dispense Hx    Changes made to PTA medication list (reason)  Added: Vanicream  Deleted: Acetaminophen - no longer using      Ciclopirox cream - no longer using      Ketoconazole cream - no longer using      Tramadol - no longer using      Loratadine - no longer using  Changed: Warfarin 5 mg tablet - Take 2.5 mg by mouth Tues and Sat. Take 5 mg by mouth all other days. INR goal: 2-3. Managed by [facility]. Most recent outpatient INR 1.7 on 3/7/19.    Additional medication history information (including reliability of information, actions taken by pharmacist):    Noe was pleasant to speak with and is a good historian of his medication use. He believes he took all his morning medications today, but admits \"it's been a little foggy since the accident\". Warfarin regimen was confirmed via the patient as well as the 3/7/19 Anticoagulation visit notes.    Of note: Noe has trouble cutting his doxazosin tablets in half because they are so small, reports they \"crumble\" and he has a hard time taking his dose.    Allergies confirmed.    Home pharmacy: [facility] ([facility])      Prior to Admission medications    Medication Sig Last Dose Taking? Auth Provider   aspirin 81 MG tablet Take 1 tablet by mouth daily. 3/12/2019 at AM Yes Reported, Patient   atorvastatin (LIPITOR) 40 MG tablet Take 1 tablet (40 mg) by mouth daily as directed. 3/11/2019 at PM Yes Roberto Sarmiento MD   cholecalciferol 1000 units TABS Take 1,000 Units by mouth 2 times daily 3/12/2019 at AM Yes Unknown, Entered By History   cyanocobalamin (VITAMIN B-12) 1000 MCG tablet Take 1,000 mcg by mouth 2 times daily 3/12/2019 at AM Yes Unknown, " Entered By History   doxazosin (CARDURA) 2 MG tablet Take 1/2 tab in the morning and 1/2 tab in the evening 3/12/2019 at AM Yes Roberto Sarmiento MD   emollient (VANICREAM) external cream Apply topically as needed for other (FEET) 3/12/2019 at AM Yes Unknown, Entered By History   metoprolol tartrate (LOPRESSOR) 25 MG tablet Take 1 tablet (25 mg) by mouth 2 times daily 3/12/2019 at AM Yes Roberto Sarmiento MD   mirtazapine (REMERON) 15 MG tablet Take 15 mg by mouth At Bedtime. 3/12/2019 at AM Yes Reported, Patient   Multiple Vitamins-Minerals (CENTRUM SILVER) per tablet Take 1 tablet by mouth daily. AM  3/12/2019 at AM Yes Unknown, Entered By History   sertraline (ZOLOFT) 100 MG tablet Take 100 mg by mouth every evening. 3/11/2019 at PM Yes Reported, Patient   warfarin (COUMADIN) 5 MG tablet TAKE 2.5 MG BY MOUTH ON TUESDAYS AND SATURDAYS. TAKE 5 MG BY MOUTH ALL OTHER DAYS. 3/11/2019 at PM Yes Unknown, Entered By History   Blood Pressure Monitor KIT 1 Units daily   Deedee Baird MD   order for DME 20-30 mm Hg knee length compression stockings.  Measure and fit.  Style and color per patient preference.  Doquynh molina Aracelis per patient need.   Frida Desai MD         Medication history completed by: Belinda Cope, 3rd Year Student Pharmacist

## 2019-03-13 NOTE — PROGRESS NOTES
[facility], [place]    Medicine Consult Progress Note - Hospitalist Service, Gold 1       Date of Admission:  3/12/2019  Assessment & Plan   Noe Florence is a 83 year old male admitted on 3/12/2019. He has a history of CKD, CAD s/p stenting and CABG, pacemaker and ICD for NSVT, DVT in 2010, anemia, colon cancer s/p sigmoid colectomy and recent transverse colectomy, atrial fibrillation (on coumadin), and is admitted with an intertrochanteric fracture of the left femur following a fall.    Main Plans for Today:  - To OR for fixation with Orthopedic Surgery  - Medicine to continue to follow post-procedure     1) Intertrochanteric fracture of left femur - Sustained following fall on the ice 3/12/2019.  - Trauma and orthopedics managing.  - On warfarin for atrial fibrillation. Holding. Received Vitamin K 3/12 and again this morning.  - No current contraindications to surgery.  Patient already on a beta blocker.  No recent ischemic symptoms.  - NSQIP predicted mortality is 0.2%, cardiac complication 0.3%, 60% chance of discharge to rehab.  He has tolerated surgeries quite well in the past including recent colon resection.     2) Atrial fibrillation  - Reversing warfarin as above. Continue to hold  - Hold home ASA  - Would continue home metoprolol     3) History of CKD - Cr 1.43 on admission which is on par with past values. Received IV fluids overnight with improvement in creatinine.   - Would follow daily BMP     4) History of CAD - S/p CABG x2 and stenting x2.  Prior ischemic symptoms have included a sense of unusual fullness, but he has not experienced this for close to a year.  - Would continue ASA once able per Ortho.  - Continue beta blocker     5) History of depression  - Continue home mirtazapine, sertaline    Thank you for allowing us to participate in Mr. Florence's care. We will continue to follow along with you.        Diet: NPO for Medical/Clinical Reasons Except for: Meds    DVT  Prophylaxis: Defer to primary service  Dominguez Catheter: not present  Code Status: Full Code      Disposition Plan   Expected discharge: Per Primary Service  Entered: Praveen Styles MD 03/13/2019, 3:29 PM       The patient's care was discussed with the Bedside Nurse, Care Coordinator/ and Patient.    Praveen Styles MD  Hospitalist Service, [address], [place]  Pager: [phone]  Please see sticky note for cross cover information  ______________________________________________________________________    Interval History   No acute events overnight. Pain well controlled with analgesics. No chest pain, shortness of breath. No difficulty breathing. Ready for surgery.    4 Point Review of Systems otherwise negative.     Data reviewed today: I reviewed all medications, new labs and imaging results over the last 24 hours. I personally reviewed no images or EKG's today.    Physical Exam   Vital Signs: Temp: 96.8  F (36  C) Temp src: Oral BP: 134/67 Pulse: 70 Heart Rate: 107 Resp: 16 SpO2: 98 % O2 Device: None (Room air)    Weight: 0 lbs 0 oz  General: AAOx3, NAD   CV: RRR, normal S1S2, no murmur, clicks, rubs   Resp: Clear to auscultation bilaterally, no wheezes, rhonchi   Abd: Soft, non-tender, BS+, no masses appreciated   Extremities: Radial and pedal pulses intact and symmetric, mild L>R pedal edema   Neuro: No lateralizing symptoms or focal neurologic deficits     Data   Recent Labs   Lab 03/13/19  1259 03/13/19  0959 03/13/19  0526 03/12/19  1450   WBC  --   --  8.0 7.9   HGB  --   --  7.8* 7.8*   MCV  --   --  90 92   PLT  --   --  206 236   INR 1.59* 1.67* 1.81* 2.35*   NA  --   --  144 143   POTASSIUM  --   --  4.3 4.5   CHLORIDE  --   --  112* 110*   CO2  --   --  23 23   BUN  --   --  38* 44*   CR  --   --  1.28* 1.43*   ANIONGAP  --   --  9 9   KEITH  --   --  8.1* 8.6   GLC  --   --  111* 100*   ALBUMIN  --   --  2.9*  --

## 2019-03-13 NOTE — CONSULTS
[facility], [place]  Consult Note - Hospitalist Service, Gold Night     Date of Admission:  3/12/2019  Consult Requested by: COLTON Araya  Reason for Consult: Pre-op evaluation    Assessment & Plan   Noe Florence is a 83 year old male admitted on 3/12/2019. He has a history of CKD, CAD s/p stenting and CABG, pacemaker and ICD for NSVT, DVT in 2010, anemia, colon cancer s/p sigmoid colectomy and recent transverse colectomy, atrial fibrillation (on coumadin), and is admitted with an intertrochanteric fracture of the left femur following a fall.    1) Intertrochanteric fracture of left femur - Sustained following fall on the ice 3/12/2019.  - Trauma and orthopedics managing.  - On coumadin for atrial fibrillation.  Vitamin K already ordered.  - No current contraindications to surgery.  Patient already on a beta blocker.  No recent ischemic symptoms.  - NSQIP predicted mortality is 0.2%, cardiac complication 0.3%, 60% chance of discharge to rehab.  He has tolerated surgeries quite well in the past including recent colon resection.    2) Atrial fibrillation  - Reversing coumadin as above.  Would hold home ASA  - Would continue home metoprolol    3) History of CKD - Cr 1.43 today, on par with past values.  BUN/Cr is suggestive of dehydration and given NPO status could consider a fluid bolus of 500-1000 ccs in addition to his maintenance fluid.  - Would follow daily BMP    4) History of CAD - S/p CABG x2 and stenting x2.  Prior ischemic symptoms have included a sense of unusual fullness, but he has not experienced this for close to a year.  - Would continue ASA once able per ortho.  - Remains on beta blocker    5) History of depression  - Continue home mirtazapine, sertaline     MARIA LUISA Nuñez CNP  [facility], [place]  Pager: [phone]  Please see sticky note for cross cover  "information  ______________________________________________________________________    Chief Complaint   Consulted for general medical management.    History is obtained from the patient    History of Present Illness   Noe Florence is a 83 year old male admitted on 3/12/2019. He has a history of CKD, CAD s/p stenting and CABG, pacemaker and ICD for NSVT, DVT, anemia, colon cancer s/p sigmoid colectomy and transverse colectomy, atrial fibrillation (on coumadin), and is admitted with an intertrochanteric fracture of the left femur following a fall.    He reports he was trying to move a trash can out of the way today and tumbled to the side and landed on his hip.  He did not strike his head.  He denies any prodrome and attributes his fall to a slip.    Otherwise he denies any recent illness.  In the past he has never had chest pain as an ischemia indicator but noted a sense of unusual \"fullness.\"   He has not experienced this for more than a year.  He is generally an independent and active individual.    Review of Systems   The 10 point Review of Systems is negative other than noted in the HPI or here.     Past Medical History    I have reviewed this patient's medical history and updated it with pertinent information if needed.   Past Medical History:   Diagnosis Date     Advanced open-angle glaucoma      Antiplatelet or antithrombotic long-term use      Atrial fibrillation (H)      CKD (chronic kidney disease), stage III (H) 2005     Colon cancer (H)     Stage II-B colon cancer     Coronary artery disease     s/p CABG x 2, JEREMY x 2     Diverticulitis      Hyperlipidemia      Hypertension      Ischemic cardiomyopathy      MGUS (monoclonal gammopathy of unknown significance)      Nonsenile cataract     BE     Pacemaker      Polymorphic ventricular tachycardia (H)      Polymyalgia rheumatica (H)      PVD (posterior vitreous detachment), both eyes 2005     S/P ablation of atrial flutter 6/20/14    CTI     Stented " coronary artery      SVT (supraventricular tachycardia) (H)     PPM/AICD for NSVT     Upper leg DVT (deep venous thromboembolism), chronic (H)     Left     Weight loss, unintentional 2017    15 lb in 4 months       Past Surgical History   I have reviewed this patient's surgical history and updated it with pertinent information if needed.  Past Surgical History:   Procedure Laterality Date     C CABG, ARTERY-VEIN, FOUR  2006    LIMA-LAD, SVG-Rt PDA, SVG-OM2, SVG-Diag 1     C CABG, VEIN, SINGLE  1994    1-vessel CABG, SVG->PDRCA      CATARACT IOL, RT/LT Bilateral      COLONOSCOPY  3/13/2014    Procedure: COMBINED COLONOSCOPY, SINGLE BIOPSY/POLYPECTOMY BY BIOPSY;;  Surgeon: Mary Gerber MD;  Location:  GI     COLONOSCOPY N/A 6/22/2015    Procedure: COLONOSCOPY;  Surgeon: Marilin Newman MD;  Location:  GI     COLONOSCOPY N/A 11/7/2018    Procedure: COMBINED COLONOSCOPY, SINGLE OR MULTIPLE BIOPSY/POLYPECTOMY BY BIOPSY;  Surgeon: Roberto Esteban MD;  Location:  GI     EP ICD GENERATOR CHANGE DUAL N/A 12/11/2018    Procedure: EP ICD Generator Change Dual;  Surgeon: Deedee Baird MD;  Location:  HEART CARDIAC CATH LAB     H ABLATION ATRIAL FLUTTER       HEART CATH DRUG ELUTING STENT PLACEMENT  4/19/2012    JEREMY x 2 to LCx     IMPLANT IMPLANTABLE CARDIOVERTER DEFIBRILLATOR  5-    ppm/aicd     KNEE SURGERY      right and left knee surgeries     LAPAROSCOPIC ASSISTED COLECTOMY Right 2/1/2019    Procedure: Laparoscopic Converted to Open Transverse Colectomy with Lysis of Adhesions;  Surgeon: Chanelle Guzmán MD;  Location:  OR     LAPAROSCOPIC ASSISTED COLECTOMY LEFT (DESCENDING)  4/8/2014    Procedure: LAPAROSCOPIC ASSISTED COLECTOMY LEFT (DESCENDING);  Hand assisted Laparoscopic Sigmoid Colectomy , Laparoscopic mobilization of spleenic flexure *Latex Allergy*Anesthesia General with Block;  Surgeon: Chanelle Guzmán MD;  Location:  OR     REPAIR VALVE MITRAL  2006      30-mm Medtronic Julian ring      SELECTIVE LASER TRABECULOPLASTY (SLT) OD (RIGHT EYE)  4/10, ,+13    RE     SELECTIVE LASER TRABECULOPLASTY (SLT) OS (LEFT EYE)  2012     SHOULDER SURGERY      right rotator cuff       Social History   I have reviewed this patient's social history and updated it with pertinent information if needed.  Social History     Tobacco Use     Smoking status: Former Smoker     Types: Cigarettes, Cigars     Last attempt to quit: 1968     Years since quittin.2     Smokeless tobacco: Never Used   Substance Use Topics     Alcohol use: Yes     Alcohol/week: 0.0 oz     Comment: 2-3 drinks twice weekly     Drug use: No       Family History   I have reviewed this patient's family history and updated it with pertinent information if needed.   Family History   Problem Relation Age of Onset     C.A.D. Father      Anesthesia Reaction No family hx of      Crohn's Disease No family hx of      Ulcerative Colitis No family hx of      Cancer - colorectal No family hx of      Macular Degeneration No family hx of      Cancer No family hx of         No known family hx of skin cancer     Melanoma No family hx of      Skin Cancer No family hx of        Medications   I have reviewed this patient's current medications    Allergies   Allergies   Allergen Reactions     Latex Rash     Rash       Physical Exam   Vital Signs: Temp: 97.7  F (36.5  C) Temp src: Oral BP: 115/64   Heart Rate: 93 Resp: 16 SpO2: 97 % O2 Device: None (Room air)      Physical Exam   Constitutional:   Thin, well developed, resting comfortably   Head: Normocephalic and atraumatic.   Eyes: Conjunctivae are normal. Pupils are equal, round, and reactive to light.  Pharynx has no erythema or exudate, mucous membranes are moist  Neck:   No adenopathy, no bony tenderness  Cardiovascular: Regular rate and rhythm without murmurs or gallops  Pulmonary/Chest: Clear to auscultation bilaterally, with no wheezes or retractions. No  respiratory distress.  GI: Soft with good bowel sounds.  Non-tender, non-distended, with no guarding, no rebound, no peritoneal signs.   Musculoskeletal:  Left hip tender, left leg rotated  Skin: Skin is warm and dry. No rash noted.   Neurological: Alert and oriented to person, place, and time. Nonfocal exam  Psychiatric:  Normal mood and affect.    Data     Exam: XR FEMUR LT 2 VW, 3/12/2019 7:10 PM     Indication: pre op planning for left femoral nail     Comparison: X-ray of the pelvis 3/12/2019     Findings:   AP and crosstable lateral views of the left hip and proximal knee..     Redemonstration of left intertrochanteric fracture with superior  angulation of the proximal femoral component and superior migration of  the distal component. Fracture line extends into the midportion of the  greater trochanter.      Marked arterial vascular calcifications. Pelvic phleboliths. Surgical  sutures in the descending colon. Severe joint space narrowing,  subchondral sclerosis and osteophytosis of the knee joint.                                                                      Impression:   Redemonstration of shortened/proximally migrated and varus angulation  of the intertrochanteric fracture of the left proximal femur.      I have personally reviewed the examination and initial interpretation  and I agree with the findings.     RA CLARKE MD

## 2019-03-13 NOTE — PROGRESS NOTES
Orthopaedic Surgery Daily Progress Note     Subjective: Noe Florence is a 83 year old male s/p fall with intertrochanteric fracture.   Pain is well controlled. Currently NPO. No fever, chills. No chest pain or shortness of breath. No abdominal pain, nausea, vomiting. No diarrhea or constipation. No urinary difficulties.     Physical Exam   /70 (BP Location: Left arm)   Temp 96.9  F (36.1  C) (Oral)   Resp 16   SpO2 96%     Intake/Output Summary (Last 24 hours) at 3/13/2019 0602  Last data filed at 3/13/2019 0441  Gross per 24 hour   Intake 954.58 ml   Output 500 ml   Net 454.58 ml     General: Alert, well-appearing  in no acute distress.  Respiratory: Non-labored breathing.   Cardiovascular: Extremities warm and well perfused   Extremities: moving all four extremities.   LLE:  -Sens: SILT dp/sp/s/s/t  -Motor: 5/5 EHL/FHL/TA/GSc  -Vasc: 2+ pulses, wwp, brisk cap refill    Dressing CDI    Skin: As noted above. No rashes or lesions appreciated.    Labs    Complete Blood Count   Recent Labs   Lab 03/12/19  1450   WBC 7.9   HGB 7.8*        Basic Metabolic Panel  Recent Labs   Lab 03/12/19  1450      POTASSIUM 4.5   CHLORIDE 110*   CO2 23   BUN 44*   CR 1.43*   *     Coagulation Profile  Recent Labs   Lab 03/12/19  1450 03/07/19   INR 2.35* 1.7     Assessment/Plan:  Noe Florence is a 83 year old male with PMH of  Anemia, CAD, on coumadin  s/p fall with severe pain found to have displaced intertrochanteric femur fracture currently being medically optimized prior to OR today with Dr. Avalos     Received another 5 mg IV Vitamin K from trauma after discussion this AM.      Orthopedic Surgery Consulting  He wishes to proceed with surgery in order to restore his mobility. We will plan for left hip IM nail tomorrow with Dr. Avalos.  -Received 5mg IV vitamin K. Repeat INR ordered for 04:00. Will repeat 5mg IV Vitamin K if INR not <1.5 on am blood draw.   -Bedrest.   -NPO at midnight   -Continue IV  fluids and monitor Cr closely due to ARSALAN.   -Administer 1U PRBC tonight.  -He has been cleared by internal medicine.   -Will need pacemaker eval pre op. Spoke with anesthesiologist on call and pre op nurse, CRNA or anesthesiologist will call Pacemaker nurse to evaluate as needed.    -Full femur XR complete  -Informed consent signed and in chart.     Tremayne Qiu MD   Orthopaedic Surgery Resident, PGY-1  P: [phone]

## 2019-03-13 NOTE — PLAN OF CARE
Vital signs:  Temp: 96.9  F (36.1  C) Temp src: Oral BP: 115/70   Heart Rate: 97 Resp: 16 SpO2: 96 % O2 Device: None (Room air)          Activity: Strict bedrest.   Neuros: A & Ox4. Neuro intact.   Cardiac: HR 90s. Rhythm regular. BPs stable. Has history of a. Fib.   Respiratory: Lung sounds clear. O2 sats high 90s on RA.   GI/: BS+, passing flatus, no BM. Voiding small amounts, has occasional urine incontinence and wears briefs. Briefs soiled and changed x1.   Diet: NPO status, patient aware.   Skin: Edema in left leg. CMS and pedal pulses intact. Denies numbness or tingling.   Lines: MIVF infusing via right PIV per orders. One unit packed RBCs infused per orders, completed at 0415. VSS throughout and asymptomatic.   Incisions/Drains: None.   Labs: Awaiting INR result.  Pain/nausea: Scheduled Tylenol for left hip discomfort, declined narcotics. Denies nausea.   Plan: OR today at 1600.

## 2019-03-14 ENCOUNTER — APPOINTMENT (OUTPATIENT)
Dept: GENERAL RADIOLOGY | Facility: CLINIC | Age: 84
DRG: 481 | End: 2019-03-14
Attending: STUDENT IN AN ORGANIZED HEALTH CARE EDUCATION/TRAINING PROGRAM
Payer: COMMERCIAL

## 2019-03-14 ENCOUNTER — APPOINTMENT (OUTPATIENT)
Dept: GENERAL RADIOLOGY | Facility: CLINIC | Age: 84
DRG: 481 | End: 2019-03-14
Attending: ORTHOPAEDIC SURGERY
Payer: COMMERCIAL

## 2019-03-14 LAB
ANION GAP SERPL CALCULATED.3IONS-SCNC: 8 MMOL/L (ref 3–14)
BUN SERPL-MCNC: 27 MG/DL (ref 7–30)
CALCIUM SERPL-MCNC: 8.3 MG/DL (ref 8.5–10.1)
CHLORIDE SERPL-SCNC: 112 MMOL/L (ref 94–109)
CO2 SERPL-SCNC: 24 MMOL/L (ref 20–32)
CREAT SERPL-MCNC: 1.11 MG/DL (ref 0.66–1.25)
CREAT SERPL-MCNC: 1.17 MG/DL (ref 0.66–1.25)
ERYTHROCYTE [DISTWIDTH] IN BLOOD BY AUTOMATED COUNT: 16.3 % (ref 10–15)
GFR SERPL CREATININE-BSD FRML MDRD: 57 ML/MIN/{1.73_M2}
GFR SERPL CREATININE-BSD FRML MDRD: 61 ML/MIN/{1.73_M2}
GLUCOSE BLDC GLUCOMTR-MCNC: 83 MG/DL (ref 70–99)
GLUCOSE SERPL-MCNC: 106 MG/DL (ref 70–99)
HCT VFR BLD AUTO: 24.7 % (ref 40–53)
HGB BLD-MCNC: 7.6 G/DL (ref 13.3–17.7)
INR PPP: 1.6 (ref 0.86–1.14)
MCH RBC QN AUTO: 27.9 PG (ref 26.5–33)
MCHC RBC AUTO-ENTMCNC: 30.8 G/DL (ref 31.5–36.5)
MCV RBC AUTO: 91 FL (ref 78–100)
PLATELET # BLD AUTO: 191 10E9/L (ref 150–450)
PLATELET # BLD AUTO: 201 10E9/L (ref 150–450)
POTASSIUM SERPL-SCNC: 4 MMOL/L (ref 3.4–5.3)
RBC # BLD AUTO: 2.72 10E12/L (ref 4.4–5.9)
SODIUM SERPL-SCNC: 144 MMOL/L (ref 133–144)
WBC # BLD AUTO: 13.1 10E9/L (ref 4–11)

## 2019-03-14 PROCEDURE — 25000132 ZZH RX MED GY IP 250 OP 250 PS 637: Performed by: NURSE PRACTITIONER

## 2019-03-14 PROCEDURE — C1713 ANCHOR/SCREW BN/BN,TIS/BN: HCPCS | Performed by: ORTHOPAEDIC SURGERY

## 2019-03-14 PROCEDURE — 82565 ASSAY OF CREATININE: CPT | Performed by: STUDENT IN AN ORGANIZED HEALTH CARE EDUCATION/TRAINING PROGRAM

## 2019-03-14 PROCEDURE — 00000146 ZZHCL STATISTIC GLUCOSE BY METER IP

## 2019-03-14 PROCEDURE — 36415 COLL VENOUS BLD VENIPUNCTURE: CPT | Performed by: NURSE PRACTITIONER

## 2019-03-14 PROCEDURE — 25000128 H RX IP 250 OP 636

## 2019-03-14 PROCEDURE — 71000014 ZZH RECOVERY PHASE 1 LEVEL 2 FIRST HR: Performed by: ORTHOPAEDIC SURGERY

## 2019-03-14 PROCEDURE — 27210794 ZZH OR GENERAL SUPPLY STERILE: Performed by: ORTHOPAEDIC SURGERY

## 2019-03-14 PROCEDURE — 85027 COMPLETE CBC AUTOMATED: CPT | Performed by: NURSE PRACTITIONER

## 2019-03-14 PROCEDURE — 37000009 ZZH ANESTHESIA TECHNICAL FEE, EACH ADDTL 15 MIN: Performed by: ORTHOPAEDIC SURGERY

## 2019-03-14 PROCEDURE — 25800030 ZZH RX IP 258 OP 636

## 2019-03-14 PROCEDURE — 27211024 ZZHC OR SUPPLY OTHER OPNP: Performed by: ORTHOPAEDIC SURGERY

## 2019-03-14 PROCEDURE — 71000015 ZZH RECOVERY PHASE 1 LEVEL 2 EA ADDTL HR: Performed by: ORTHOPAEDIC SURGERY

## 2019-03-14 PROCEDURE — 40000170 ZZH STATISTIC PRE-PROCEDURE ASSESSMENT II: Performed by: ORTHOPAEDIC SURGERY

## 2019-03-14 PROCEDURE — 37000008 ZZH ANESTHESIA TECHNICAL FEE, 1ST 30 MIN: Performed by: ORTHOPAEDIC SURGERY

## 2019-03-14 PROCEDURE — 36000066 ZZH SURGERY LEVEL 4 W FLUORO 1ST 30 MIN - UMMC: Performed by: ORTHOPAEDIC SURGERY

## 2019-03-14 PROCEDURE — 40000275 ZZH STATISTIC RCP TIME EA 10 MIN

## 2019-03-14 PROCEDURE — 99222 1ST HOSP IP/OBS MODERATE 55: CPT | Performed by: INTERNAL MEDICINE

## 2019-03-14 PROCEDURE — 25800030 ZZH RX IP 258 OP 636: Performed by: EMERGENCY MEDICINE

## 2019-03-14 PROCEDURE — 0QS706Z REPOSITION LEFT UPPER FEMUR WITH INTRAMEDULLARY INTERNAL FIXATION DEVICE, OPEN APPROACH: ICD-10-PCS | Performed by: ORTHOPAEDIC SURGERY

## 2019-03-14 PROCEDURE — 25000125 ZZHC RX 250

## 2019-03-14 PROCEDURE — 99232 SBSQ HOSP IP/OBS MODERATE 35: CPT | Performed by: NURSE PRACTITIONER

## 2019-03-14 PROCEDURE — 36415 COLL VENOUS BLD VENIPUNCTURE: CPT | Performed by: STUDENT IN AN ORGANIZED HEALTH CARE EDUCATION/TRAINING PROGRAM

## 2019-03-14 PROCEDURE — 25000132 ZZH RX MED GY IP 250 OP 250 PS 637: Performed by: STUDENT IN AN ORGANIZED HEALTH CARE EDUCATION/TRAINING PROGRAM

## 2019-03-14 PROCEDURE — 12000001 ZZH R&B MED SURG/OB UMMC

## 2019-03-14 PROCEDURE — 25800030 ZZH RX IP 258 OP 636: Performed by: SURGERY

## 2019-03-14 PROCEDURE — 40000014 ZZH STATISTIC ARTERIAL MONITORING DAILY

## 2019-03-14 PROCEDURE — 40000986 XR FEMUR PORT LEFT 2 VW: Mod: LT

## 2019-03-14 PROCEDURE — 85049 AUTOMATED PLATELET COUNT: CPT | Performed by: STUDENT IN AN ORGANIZED HEALTH CARE EDUCATION/TRAINING PROGRAM

## 2019-03-14 PROCEDURE — 25000566 ZZH SEVOFLURANE, EA 15 MIN: Performed by: ORTHOPAEDIC SURGERY

## 2019-03-14 PROCEDURE — 36000064 ZZH SURGERY LEVEL 4 EA 15 ADDTL MIN - UMMC: Performed by: ORTHOPAEDIC SURGERY

## 2019-03-14 PROCEDURE — 80048 BASIC METABOLIC PNL TOTAL CA: CPT | Performed by: NURSE PRACTITIONER

## 2019-03-14 PROCEDURE — 25000128 H RX IP 250 OP 636: Performed by: STUDENT IN AN ORGANIZED HEALTH CARE EDUCATION/TRAINING PROGRAM

## 2019-03-14 PROCEDURE — 40000065 ZZH STATISTIC EKG NON-CHARGEABLE

## 2019-03-14 PROCEDURE — 93010 ELECTROCARDIOGRAM REPORT: CPT | Mod: 59 | Performed by: INTERNAL MEDICINE

## 2019-03-14 PROCEDURE — 25000125 ZZHC RX 250: Performed by: ORTHOPAEDIC SURGERY

## 2019-03-14 PROCEDURE — 85610 PROTHROMBIN TIME: CPT | Performed by: NURSE PRACTITIONER

## 2019-03-14 PROCEDURE — 99232 SBSQ HOSP IP/OBS MODERATE 35: CPT | Performed by: INTERNAL MEDICINE

## 2019-03-14 PROCEDURE — 40000277 XR SURGERY CARM FLUORO LESS THAN 5 MIN W STILLS: Mod: TC

## 2019-03-14 RX ORDER — SODIUM CHLORIDE, SODIUM LACTATE, POTASSIUM CHLORIDE, CALCIUM CHLORIDE 600; 310; 30; 20 MG/100ML; MG/100ML; MG/100ML; MG/100ML
INJECTION, SOLUTION INTRAVENOUS CONTINUOUS PRN
Status: DISCONTINUED | OUTPATIENT
Start: 2019-03-14 | End: 2019-03-14

## 2019-03-14 RX ORDER — ACETAMINOPHEN 325 MG/1
975 TABLET ORAL ONCE
Status: COMPLETED | OUTPATIENT
Start: 2019-03-14 | End: 2019-03-14

## 2019-03-14 RX ORDER — LIDOCAINE HYDROCHLORIDE 20 MG/ML
INJECTION, SOLUTION INFILTRATION; PERINEURAL PRN
Status: DISCONTINUED | OUTPATIENT
Start: 2019-03-14 | End: 2019-03-14

## 2019-03-14 RX ORDER — SODIUM CHLORIDE, SODIUM LACTATE, POTASSIUM CHLORIDE, CALCIUM CHLORIDE 600; 310; 30; 20 MG/100ML; MG/100ML; MG/100ML; MG/100ML
INJECTION, SOLUTION INTRAVENOUS CONTINUOUS
Status: DISCONTINUED | OUTPATIENT
Start: 2019-03-14 | End: 2019-03-14 | Stop reason: HOSPADM

## 2019-03-14 RX ORDER — FENTANYL CITRATE 50 UG/ML
INJECTION, SOLUTION INTRAMUSCULAR; INTRAVENOUS PRN
Status: DISCONTINUED | OUTPATIENT
Start: 2019-03-14 | End: 2019-03-14

## 2019-03-14 RX ORDER — LABETALOL HYDROCHLORIDE 5 MG/ML
10 INJECTION, SOLUTION INTRAVENOUS
Status: DISCONTINUED | OUTPATIENT
Start: 2019-03-14 | End: 2019-03-14 | Stop reason: HOSPADM

## 2019-03-14 RX ORDER — ONDANSETRON 2 MG/ML
4 INJECTION INTRAMUSCULAR; INTRAVENOUS EVERY 30 MIN PRN
Status: DISCONTINUED | OUTPATIENT
Start: 2019-03-14 | End: 2019-03-14 | Stop reason: HOSPADM

## 2019-03-14 RX ORDER — PROPOFOL 10 MG/ML
INJECTION, EMULSION INTRAVENOUS PRN
Status: DISCONTINUED | OUTPATIENT
Start: 2019-03-14 | End: 2019-03-14

## 2019-03-14 RX ORDER — NALOXONE HYDROCHLORIDE 0.4 MG/ML
.1-.4 INJECTION, SOLUTION INTRAMUSCULAR; INTRAVENOUS; SUBCUTANEOUS
Status: DISCONTINUED | OUTPATIENT
Start: 2019-03-14 | End: 2019-03-14

## 2019-03-14 RX ORDER — FENTANYL CITRATE 50 UG/ML
25-50 INJECTION, SOLUTION INTRAMUSCULAR; INTRAVENOUS
Status: DISCONTINUED | OUTPATIENT
Start: 2019-03-14 | End: 2019-03-14 | Stop reason: HOSPADM

## 2019-03-14 RX ORDER — KETAMINE HYDROCHLORIDE 10 MG/ML
INJECTION, SOLUTION INTRAMUSCULAR; INTRAVENOUS PRN
Status: DISCONTINUED | OUTPATIENT
Start: 2019-03-14 | End: 2019-03-14

## 2019-03-14 RX ORDER — DEXAMETHASONE SODIUM PHOSPHATE 4 MG/ML
INJECTION, SOLUTION INTRA-ARTICULAR; INTRALESIONAL; INTRAMUSCULAR; INTRAVENOUS; SOFT TISSUE PRN
Status: DISCONTINUED | OUTPATIENT
Start: 2019-03-14 | End: 2019-03-14

## 2019-03-14 RX ORDER — CEFAZOLIN SODIUM 1 G/3ML
1 INJECTION, POWDER, FOR SOLUTION INTRAMUSCULAR; INTRAVENOUS SEE ADMIN INSTRUCTIONS
Status: DISCONTINUED | OUTPATIENT
Start: 2019-03-14 | End: 2019-03-14 | Stop reason: HOSPADM

## 2019-03-14 RX ORDER — CEFAZOLIN SODIUM 1 G/3ML
1 INJECTION, POWDER, FOR SOLUTION INTRAMUSCULAR; INTRAVENOUS EVERY 8 HOURS
Status: COMPLETED | OUTPATIENT
Start: 2019-03-15 | End: 2019-03-15

## 2019-03-14 RX ORDER — ONDANSETRON 4 MG/1
4 TABLET, ORALLY DISINTEGRATING ORAL EVERY 30 MIN PRN
Status: DISCONTINUED | OUTPATIENT
Start: 2019-03-14 | End: 2019-03-14 | Stop reason: HOSPADM

## 2019-03-14 RX ORDER — LIDOCAINE 40 MG/G
CREAM TOPICAL
Status: DISCONTINUED | OUTPATIENT
Start: 2019-03-14 | End: 2019-03-19 | Stop reason: HOSPADM

## 2019-03-14 RX ORDER — HYDRALAZINE HYDROCHLORIDE 20 MG/ML
2.5-5 INJECTION INTRAMUSCULAR; INTRAVENOUS EVERY 10 MIN PRN
Status: DISCONTINUED | OUTPATIENT
Start: 2019-03-14 | End: 2019-03-14 | Stop reason: HOSPADM

## 2019-03-14 RX ORDER — CEFAZOLIN SODIUM 2 G/100ML
2 INJECTION, SOLUTION INTRAVENOUS
Status: COMPLETED | OUTPATIENT
Start: 2019-03-14 | End: 2019-03-14

## 2019-03-14 RX ORDER — GABAPENTIN 100 MG/1
100 CAPSULE ORAL
Status: COMPLETED | OUTPATIENT
Start: 2019-03-14 | End: 2019-03-14

## 2019-03-14 RX ORDER — HYDROMORPHONE HYDROCHLORIDE 1 MG/ML
.3-.5 INJECTION, SOLUTION INTRAMUSCULAR; INTRAVENOUS; SUBCUTANEOUS EVERY 5 MIN PRN
Status: DISCONTINUED | OUTPATIENT
Start: 2019-03-14 | End: 2019-03-14 | Stop reason: HOSPADM

## 2019-03-14 RX ORDER — SODIUM CHLORIDE, SODIUM LACTATE, POTASSIUM CHLORIDE, CALCIUM CHLORIDE 600; 310; 30; 20 MG/100ML; MG/100ML; MG/100ML; MG/100ML
INJECTION, SOLUTION INTRAVENOUS CONTINUOUS
Status: DISCONTINUED | OUTPATIENT
Start: 2019-03-14 | End: 2019-03-15

## 2019-03-14 RX ADMIN — SUGAMMADEX 130 MG: 100 INJECTION, SOLUTION INTRAVENOUS at 19:15

## 2019-03-14 RX ADMIN — SODIUM CHLORIDE: 9 INJECTION, SOLUTION INTRAVENOUS at 10:52

## 2019-03-14 RX ADMIN — Medication 10 MG: at 18:40

## 2019-03-14 RX ADMIN — MIRTAZAPINE 15 MG: 15 TABLET, FILM COATED ORAL at 21:53

## 2019-03-14 RX ADMIN — GABAPENTIN 100 MG: 100 CAPSULE ORAL at 15:54

## 2019-03-14 RX ADMIN — VITAMIN D, TAB 1000IU (100/BT) 1000 UNITS: 25 TAB at 22:26

## 2019-03-14 RX ADMIN — MULTIPLE VITAMINS W/ MINERALS TAB 1 TABLET: TAB at 08:06

## 2019-03-14 RX ADMIN — CALCIUM 500 MG: 500 TABLET ORAL at 08:06

## 2019-03-14 RX ADMIN — LIDOCAINE 1 PATCH: 560 PATCH PERCUTANEOUS; TOPICAL; TRANSDERMAL at 08:06

## 2019-03-14 RX ADMIN — ONDANSETRON 4 MG: 2 INJECTION INTRAMUSCULAR; INTRAVENOUS at 19:05

## 2019-03-14 RX ADMIN — ACETAMINOPHEN 975 MG: 325 TABLET, FILM COATED ORAL at 21:53

## 2019-03-14 RX ADMIN — SODIUM CHLORIDE, POTASSIUM CHLORIDE, SODIUM LACTATE AND CALCIUM CHLORIDE 500 ML: 600; 310; 30; 20 INJECTION, SOLUTION INTRAVENOUS at 23:30

## 2019-03-14 RX ADMIN — FENTANYL CITRATE 50 MCG: 50 INJECTION, SOLUTION INTRAMUSCULAR; INTRAVENOUS at 18:07

## 2019-03-14 RX ADMIN — ROCURONIUM BROMIDE 30 MG: 10 INJECTION INTRAVENOUS at 17:35

## 2019-03-14 RX ADMIN — CEFAZOLIN SODIUM 2 G: 2 INJECTION, SOLUTION INTRAVENOUS at 17:51

## 2019-03-14 RX ADMIN — CYANOCOBALAMIN TAB 1000 MCG 1000 MCG: 1000 TAB at 08:06

## 2019-03-14 RX ADMIN — METOPROLOL TARTRATE 25 MG: 25 TABLET ORAL at 08:06

## 2019-03-14 RX ADMIN — ACETAMINOPHEN 975 MG: 325 TABLET, FILM COATED ORAL at 08:06

## 2019-03-14 RX ADMIN — LIDOCAINE HYDROCHLORIDE 100 MG: 20 INJECTION, SOLUTION INFILTRATION; PERINEURAL at 17:35

## 2019-03-14 RX ADMIN — PROPOFOL 30 MG: 10 INJECTION, EMULSION INTRAVENOUS at 17:35

## 2019-03-14 RX ADMIN — METHOCARBAMOL 750 MG: 750 TABLET, FILM COATED ORAL at 08:06

## 2019-03-14 RX ADMIN — METOPROLOL TARTRATE 25 MG: 25 TABLET ORAL at 22:25

## 2019-03-14 RX ADMIN — ACETAMINOPHEN 975 MG: 325 TABLET, FILM COATED ORAL at 02:45

## 2019-03-14 RX ADMIN — SODIUM CHLORIDE, POTASSIUM CHLORIDE, SODIUM LACTATE AND CALCIUM CHLORIDE: 600; 310; 30; 20 INJECTION, SOLUTION INTRAVENOUS at 17:27

## 2019-03-14 RX ADMIN — VITAMIN D, TAB 1000IU (100/BT) 1000 UNITS: 25 TAB at 08:06

## 2019-03-14 RX ADMIN — SERTRALINE HYDROCHLORIDE 100 MG: 100 TABLET ORAL at 22:24

## 2019-03-14 RX ADMIN — Medication 2.5 MG: at 02:45

## 2019-03-14 RX ADMIN — DEXAMETHASONE SODIUM PHOSPHATE 4 MG: 4 INJECTION, SOLUTION INTRA-ARTICULAR; INTRALESIONAL; INTRAMUSCULAR; INTRAVENOUS; SOFT TISSUE at 18:14

## 2019-03-14 RX ADMIN — PHENYLEPHRINE HYDROCHLORIDE 100 MCG: 10 INJECTION, SOLUTION INTRAMUSCULAR; INTRAVENOUS; SUBCUTANEOUS at 18:22

## 2019-03-14 RX ADMIN — POLYETHYLENE GLYCOL 3350 17 G: 17 POWDER, FOR SOLUTION ORAL at 08:06

## 2019-03-14 RX ADMIN — CYANOCOBALAMIN TAB 1000 MCG 1000 MCG: 1000 TAB at 22:24

## 2019-03-14 RX ADMIN — FENTANYL CITRATE 50 MCG: 50 INJECTION, SOLUTION INTRAMUSCULAR; INTRAVENOUS at 17:35

## 2019-03-14 RX ADMIN — ATORVASTATIN CALCIUM 40 MG: 40 TABLET, FILM COATED ORAL at 08:06

## 2019-03-14 RX ADMIN — ACETAMINOPHEN 975 MG: 325 TABLET, FILM COATED ORAL at 15:54

## 2019-03-14 RX ADMIN — Medication 30 MG: at 17:35

## 2019-03-14 ASSESSMENT — ACTIVITIES OF DAILY LIVING (ADL)
ADLS_ACUITY_SCORE: 18

## 2019-03-14 ASSESSMENT — ENCOUNTER SYMPTOMS: DYSRHYTHMIAS: 1

## 2019-03-14 ASSESSMENT — LIFESTYLE VARIABLES: TOBACCO_USE: 1

## 2019-03-14 NOTE — PLAN OF CARE
/68 (BP Location: Left arm)   Pulse 64   Temp 96.2  F (35.7  C) (Oral)   Resp 18   SpO2 99%      Neuro: Alert and oriented x4.    GI/: WDL. Voiding. Passing gas no BM this shift. LBM 3/13.    Diet: NPO with MIV at 75cc per hr of NS.    Incisions/Drains: None.    IV Access: PIV infusing.    Labs:INR=1.60, hgb=7.6, wbc=13.1     Activity: Bed rest. S/p left hip fx. Using IS in bed. Lungs clear.     Pain: Declines pain meds. No pain at rest. Had Robaxin x 1 this shift.    New changes this shift: Waylon wipes at 1400.    Plan: Waiting for OR today at 1700.

## 2019-03-14 NOTE — ANESTHESIA PREPROCEDURE EVALUATION
Anesthesia Pre-Procedure Evaluation    Patient: Noe Florence   MRN:     [id number] Gender:   male   Age:    83 year old :      1935        Preoperative Diagnosis: Left Femur Fracture   Procedure(s):  Open Reduction Internal Fixation Left Femur Intramedullar Nailing     Past Medical History:   Diagnosis Date     Advanced open-angle glaucoma      Antiplatelet or antithrombotic long-term use      Atrial fibrillation (H)      CKD (chronic kidney disease), stage III (H)      Colon cancer (H)     Stage II-B colon cancer     Coronary artery disease     s/p CABG x 2, JEREMY x 2     Diverticulitis      Hyperlipidemia      Hypertension      Ischemic cardiomyopathy      MGUS (monoclonal gammopathy of unknown significance)      Nonsenile cataract     BE     Pacemaker      Polymorphic ventricular tachycardia (H)      Polymyalgia rheumatica (H)      PVD (posterior vitreous detachment), both eyes      S/P ablation of atrial flutter 14    CTI     Stented coronary artery      SVT (supraventricular tachycardia) (H)     PPM/AICD for NSVT     Upper leg DVT (deep venous thromboembolism), chronic (H)     Left     Weight loss, unintentional     15 lb in 4 months      Past Surgical History:   Procedure Laterality Date     C CABG, ARTERY-VEIN, FOUR      LIMA-LAD, SVG-Rt PDA, SVG-OM2, SVG-Diag 1     C CABG, VEIN, SINGLE      1-vessel CABG, SVG->PDRCA      CATARACT IOL, RT/LT Bilateral      COLONOSCOPY  3/13/2014    Procedure: COMBINED COLONOSCOPY, SINGLE BIOPSY/POLYPECTOMY BY BIOPSY;;  Surgeon: Mary Gerber MD;  Location:  GI     COLONOSCOPY N/A 2015    Procedure: COLONOSCOPY;  Surgeon: Marilin Newman MD;  Location:  GI     COLONOSCOPY N/A 2018    Procedure: COMBINED COLONOSCOPY, SINGLE OR MULTIPLE BIOPSY/POLYPECTOMY BY BIOPSY;  Surgeon: Roberto Esteban MD;  Location: [facility]     EP ICD GENERATOR CHANGE DUAL N/A 2018    Procedure: EP ICD Generator Change Dual;   Surgeon: Deedee Baird MD;  Location:  HEART CARDIAC CATH LAB     H ABLATION ATRIAL FLUTTER       HEART CATH DRUG ELUTING STENT PLACEMENT  4/19/2012    JEREMY x 2 to LCx     IMPLANT IMPLANTABLE CARDIOVERTER DEFIBRILLATOR  5-    ppm/aicd     KNEE SURGERY      right and left knee surgeries     LAPAROSCOPIC ASSISTED COLECTOMY Right 2/1/2019    Procedure: Laparoscopic Converted to Open Transverse Colectomy with Lysis of Adhesions;  Surgeon: Chanelle Guzmán MD;  Location:  OR     LAPAROSCOPIC ASSISTED COLECTOMY LEFT (DESCENDING)  4/8/2014    Procedure: LAPAROSCOPIC ASSISTED COLECTOMY LEFT (DESCENDING);  Hand assisted Laparoscopic Sigmoid Colectomy , Laparoscopic mobilization of spleenic flexure *Latex Allergy*Anesthesia General with Block;  Surgeon: Chanelle Guzmán MD;  Location:  OR     REPAIR VALVE MITRAL  2006     30-mm Medtronic Julian ring      SELECTIVE LASER TRABECULOPLASTY (SLT) OD (RIGHT EYE)  4/10, 1/12,+1/9/13    RE     SELECTIVE LASER TRABECULOPLASTY (SLT) OS (LEFT EYE)  5/2012     SHOULDER SURGERY      right rotator cuff          Anesthesia Evaluation     . Pt has had prior anesthetic. Type: General    No history of anesthetic complications          ROS/MED HX    ENT/Pulmonary:     (+)tobacco use (quit 1968), Past use , . .   (-) sleep apnea   Neurologic:  - neg neurologic ROS     Cardiovascular:     (+) Dyslipidemia, hypertension--CAD, -CABG-date: s/p CABG 1994 with redo 2006 - LIMA-LAD, SV-RtPDA, SVG-OM2, SVG-Diag1; s/p DESx2 2012, . Taking blood thinners Pt has received instructions: Instructions Given to patient: See recommendations below. . . :ICD  type;Dual chamber; replacement 12/2018 . dysrhythmias a-fib and a-flutter, valvular problems/murmurs (s/p mitral valve ring repair 10/2006) type: MR . Previous cardiac testing Echodate:2017results:Interpretation Summary  Global and regional left ventricular function is normal with an EF of 55-60%.  Mild to moderate right  ventricular dilation is present.  A pacemaker lead is noted in the right ventricle.  Severe biatrial enlargement is present.  Mitral valve annuloplasty ring is present.  The mean gradient across the mitral valve is 4 mmHg.  The inferior vena cava was normal in size with preserved respiratory  variability.  No pericardial effusion is present.  This study was compared with the study from 12/15/2014. There has been no  change.date: results:ECG reviewed date:12/11/18 results:Atrial-paced rhythm with prolonged AV conduction with Premature atrial complexes with Aberrant conduction  Left axis deviation  Right bundle branch block  Inferior infarct , age undetermined  T wave abnormality, consider lateral ischemia date: results:         Pacemaker: s/p ICD generator; dual lead; medtronic; 12/11/18;  Patient is s/p ICD generator change with chronic leads.  Normal ICD function.  No episodes/ arrhythmias recorded.  Intrinsic rhythm = SR with 1st Deg AV Block @ 64 bpm.  AP = 65.4%.   = 16.3%.   METS/Exercise Tolerance:  >4 METS   Hematologic:     (+) History of blood clots pt is anticoagulated, Anemia (chronic anemia, baseilne Hgb ~9), -      Musculoskeletal:   (+) arthritis, , , -       GI/Hepatic:     (+) bowel prep, Other GI/Hepatic Hx of diverticulitis       Renal/Genitourinary:     (+) chronic renal disease, type: CRI, Pt does not require dialysis, Pt has no history of transplant,       Endo:  - neg endo ROS    (-) Type I DM, Type II DM and obesity   Psychiatric:  - neg psychiatric ROS       Infectious Disease:         Malignancy:   (+) Malignancy History of GI  GI CA Active status post,         Other:    (+) C-spine cleared: N/A, no H/O Chronic Pain,no other significant disability                        PHYSICAL EXAM:   Mental Status/Neuro: A/A/O   Airway: Facies: Feasible  Mallampati: I  Mouth/Opening: Full  TM distance: > 6 cm  Neck ROM: Full   Respiratory: Auscultation: CTAB     Resp. Rate: Normal     Resp. Effort:  "Normal      CV: Rhythm: Regular  Rate: Age appropriate  Heart: Normal Sounds   Comments:      Dental: Normal                  Lab Results   Component Value Date    WBC 13.1 (H) 03/14/2019    HGB 7.6 (L) 03/14/2019    HCT 24.7 (L) 03/14/2019     03/14/2019    CRP <2.9 01/15/2018    SED 43 (H) 01/15/2018     03/14/2019    POTASSIUM 4.0 03/14/2019    CHLORIDE 112 (H) 03/14/2019    CO2 24 03/14/2019    BUN 27 03/14/2019    CR 1.17 03/14/2019     (H) 03/14/2019    KEITH 8.3 (L) 03/14/2019    PHOS 3.2 03/13/2019    MAG 2.2 03/13/2019    ALBUMIN 2.9 (L) 03/13/2019    PROTTOTAL 7.4 12/19/2018    ALT 25 12/19/2018    AST 35 12/19/2018    ALKPHOS 84 12/19/2018    BILITOTAL 0.3 12/19/2018    LIPASE 106 02/04/2013    AMYLASE 124 (H) 04/15/2012    PTT 31 11/04/2017    INR 1.60 (H) 03/14/2019    FIBR 250 10/04/2006    TSH 0.67 03/27/2018       Preop Vitals  BP Readings from Last 3 Encounters:   03/14/19 122/68   03/05/19 108/63   02/19/19 115/75    Pulse Readings from Last 3 Encounters:   03/14/19 64   03/05/19 100   02/19/19 105      Resp Readings from Last 3 Encounters:   03/14/19 18   02/19/19 20   02/07/19 14    SpO2 Readings from Last 3 Encounters:   03/14/19 99%   02/19/19 99%   02/18/19 98%      Temp Readings from Last 1 Encounters:   03/14/19 35.7  C (96.2  F) (Oral)    Ht Readings from Last 1 Encounters:   02/18/19 1.702 m (5' 7\")      Wt Readings from Last 1 Encounters:   03/05/19 64.9 kg (143 lb)    Estimated body mass index is 22.4 kg/m  as calculated from the following:    Height as of 2/18/19: 1.702 m (5' 7\").    Weight as of 3/5/19: 64.9 kg (143 lb).     LDA:  Peripheral IV 12/11/18 Left Lower forearm (Active)   Number of days: 93       Peripheral IV 03/12/19 Right Lower forearm (Active)   Site Assessment WDL 3/14/2019 12:00 AM   Line Status Infusing 3/14/2019 12:00 AM   Phlebitis Scale 0-->no symptoms 3/14/2019 12:00 AM   Infiltration Scale 0 3/14/2019 12:00 AM   Infiltration Site Treatment " Method  None 3/14/2019 12:00 AM   Extravasation? No 3/14/2019 12:00 AM   Number of days: 2       Closed/Suction Drain 1 Left Abdomen Bulb 19 [place] (Active)   Number of days: 1801            Assessment:   ASA SCORE: 4 emergent        Documentation: H&P complete; Consents complete   Proceeding: Proceed without further delay     Plan:   Anes. Type:  General   Pre-Induction: Midazolam IV; Acetaminophen PO   Induction:  IV (Standard)   Airway: Oral ETT   Access/Monitoring: PIV   Maintenance: Balanced   Emergence: Procedure Site   Logistics: Same Day Surgery     Postop Pain/Sedation Strategy:  Standard-Options: Opioids PRN     PONV Management:  Adult Risk Factors:, Non-Smoker, Postop Opioids  Prevention: Ondansetron; Dexamethasone     CONSENT: Direct conversation   Plan and risks discussed with: Patient   Blood Products: Consented (ALL Blood Products)                         Laurie Romero MD

## 2019-03-14 NOTE — PLAN OF CARE
Vital signs:  Temp: 97  F (36.1  C) Temp src: Oral BP: 123/54 Heart Rate: 66 Resp: 16 SpO2: 98 % O2 Device: None (Room air)          Activity: Strict bedrest.   Neuros: A & Ox4. Neuro intact.   Cardiac: HR 60s-70s. Rhythm regular. BPs stable. Has history of a. Fib.   Respiratory: Lung sounds clear. O2 sats high 90s on RA.   GI/: BS+, passing flatus, no BM. Voiding small amounts, has occasional urine incontinence and wears briefs.   Diet: NPO status, patient aware.   Skin: Small edema in left leg. CMS and pedal pulses intact. Denies numbness or tingling.   Lines: MIVF infusing via right PIV per orders.   Incisions/Drains: None.   Labs: None.  Pain/nausea: PRN oxycodone 2.5 x1 and scheduled Tylenol for left leg discomfort. Has sharp pain with movement. Denies nausea.   Plan: OR today.

## 2019-03-14 NOTE — PROGRESS NOTES
Gave report to 3c nurse. ICD nurse updated on pt's transfer to  for surgery- left hip ORIF at 1700 via text at [phone] #[phone].

## 2019-03-14 NOTE — OR NURSING
Mary Franklin, device nurse, notified of patients upcoming procedure. Patient has ICD and pacemaker. Per Mary, patient is no dependant on pacemaker and anesthesia to use magnet if needed in OR.

## 2019-03-14 NOTE — ANESTHESIA PROCEDURE NOTES
Arterial Line Procedure Note  Staff:     Anesthesiologist:  Laurie Romero MD    Resident/CRNA:  Omero Roberson MD    Arterial line performed by resident/CRNA in presence of a teaching physician    Location: In OR After Induction  Procedure Start/Stop Times:     patient identified, IV checked, site marked, risks and benefits discussed, informed consent, monitors and equipment checked, pre-op evaluation and at physician/surgeon's request      Correct Patient: Yes      Correct Position: Yes      Correct Site: Yes      Correct Procedure: Yes      Correct Laterality:  Yes    Site Marked:  Yes  Line Placement:     Procedure:  Arterial Line    Insertion Site:  Radial    Insertion laterality:  Right    Skin Prep: Chloraprep      Patient Prep: patient draped, mask, sterile gloves, hat and hand hygiene      Local skin infiltration:  None    Ultrasound Guided?: No      Catheter size:  20 gauge, Quick cath    Cath secured with: other (comment)      Dressing:  Tegaderm    Complications:  None obvious    Arterial waveform: Yes      IBP within 10% of NIBP: Yes

## 2019-03-14 NOTE — SIGNIFICANT EVENT
SPIRITUAL HEALTH SERVICES  [facility] ([place]) 7B   PRE-SURGERY VISIT    Had pre-surgery visit with pt. Provided spiritual support, prayer.    I ANOINTED PT. ON 3/14/2019    Manuel Crawfodr M.Div.  [facility]

## 2019-03-14 NOTE — PLAN OF CARE
Neuro: Alert and oriented x 4.     GI/: WDL.BM in AM. Voiding using urinal, output is low    Diet: Regular unit MN, had been NPO until 2000, ate courtesy meal.    Incisions/Drains: None.    IV Access: PIV NS at 75cc per hr.    Labs: INR 1.59.     Activity: Bed rest.     Pain: C/o left hip pain with movement. Robaxin helping, 0.2 dilaudid given for preop scrub. Had scheduled tylenol and Lido patch removed at 2000.    New changes this shift: preop scrub done.    Plan: Watch urine output. OR tomorrow at 1700. NPO for OR. Pacemaker nurse can be reached at #[phone]. Pt has a pacemaker. Nurse will meet pt in 3C when he is transferred to pre-op.

## 2019-03-14 NOTE — CONSULTS
[facility]  Palliative Care Consultation Note    Patient: Noe Florence  Date of Admission:  3/12/2019    Requesting Clinician / Team: Sharonda Araya, Trauma Surgery  Reason for consult: Routine Geriatric Trauma consult    Recommendations:  Goals are restorative  Healthcare directive completed already and on file  Full Code requested    These recommendations have been discussed with primary team by this note.    ## Thank you for the opportunity to participate in the care of this patient and family. Our team: does not plan on following further, however do not hesitate to call or re-consult if we can be of further assistance to the patient/family.     ## During regular M-F work hours--if you are not sure who specifically to contact--please contact us by sending a text page to our team consult pager at [phone].  ## After regular work hours and on weekends/holidays, you can call our answering service at [phone]. Also, who's on call for us is available in [facility] Smart Web.     Renay Santamaria MD / Palliative Medicine / Pager [phone]    Time spent: 50 minutes, with >50% devoted to counseling and/or coordination of care.        Assessments:  Noe Florence is a 83 year old male with history of recent transverse colectomy for colon cancer (negative surgical margins), Afib, CAD who was admitted on 3/12/19 with intertrochanteric left proximal fracture. He will go to the OR today for fixation.       Prognosis, Goals, & Planning:   ## Functional Status just prior to hospitalization: Active, working around the home.  Was limited by weight restriction from recent surgery, but has been managing all of his ADLs and has been socially active.    ## Prognosis, Goals, and/or Advance Care Planning were addressed today: Comment: Discussed that he may be encouraged to enter a TCU program after discharge, he was agreeable to this.  He stated that his overall goals for his health are to continue  to maintain his independence as long as possible.  He hopes to continue to be able to be out in the community and active for some time in the future.     ## Patient's decision making preferences: shared with support from loved ones    ## Patient has a completed Health Care Directive: Yes, and on file.     ## Code status: full code, did discuss the possible outcomes from resuscitation and likely change in functional status with a successful resuscitation.  He plans to consider this and talk about it further with his partner Nicolette and will let the team know if he decides to change his code status at any point.     Coping, Meaning, & Spirituality:   Mood, coping, and/or meaning in the context of serious illness were addressed today: Summary of discussion: Mild anxiety about upcoming surgery, but comforted by doing so well with recent colectomy and denies any significant coping concerns.     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  He was found to have a left intertrochanteric fracture with plan for fixation later today.     Key Palliative Symptom Data:  ### Pain severity the last 12 hours: low,  ### Nausea severity the last 12 hours: none,  ### Dyspnea severity the last 12 hours: none  ### Anxiety severity the last 12 hours: low       Past Medical History:  Past Medical History:   Diagnosis Date     Advanced open-angle glaucoma      Antiplatelet or antithrombotic long-term use      Atrial fibrillation (H)      CKD (chronic kidney disease), stage III (H) 2005     Colon  cancer (H)     Stage II-B colon cancer     Coronary artery disease     s/p CABG x 2, JEREMY x 2     Diverticulitis      Hyperlipidemia      Hypertension      Ischemic cardiomyopathy      MGUS (monoclonal gammopathy of unknown significance)      Nonsenile cataract     BE     Pacemaker      Polymorphic ventricular tachycardia (H)      Polymyalgia rheumatica (H)      PVD (posterior vitreous detachment), both eyes 2005     S/P ablation of atrial flutter 6/20/14    CTI     Stented coronary artery      SVT (supraventricular tachycardia) (H)     PPM/AICD for NSVT     Upper leg DVT (deep venous thromboembolism), chronic (H)     Left     Weight loss, unintentional 2017    15 lb in 4 months        Past Surgical History:  Past Surgical History:   Procedure Laterality Date     C CABG, ARTERY-VEIN, FOUR  2006    LIMA-LAD, SVG-Rt PDA, SVG-OM2, SVG-Diag 1     C CABG, VEIN, SINGLE  1994    1-vessel CABG, SVG->PDRCA      CATARACT IOL, RT/LT Bilateral      COLONOSCOPY  3/13/2014    Procedure: COMBINED COLONOSCOPY, SINGLE BIOPSY/POLYPECTOMY BY BIOPSY;;  Surgeon: Mary Gerber MD;  Location:  GI     COLONOSCOPY N/A 6/22/2015    Procedure: COLONOSCOPY;  Surgeon: Marilin Newman MD;  Location:  GI     COLONOSCOPY N/A 11/7/2018    Procedure: COMBINED COLONOSCOPY, SINGLE OR MULTIPLE BIOPSY/POLYPECTOMY BY BIOPSY;  Surgeon: Roberto Esteban MD;  Location:  GI     EP ICD GENERATOR CHANGE DUAL N/A 12/11/2018    Procedure: EP ICD Generator Change Dual;  Surgeon: Deedee Baird MD;  Location:  HEART CARDIAC CATH LAB     H ABLATION ATRIAL FLUTTER       HEART CATH DRUG ELUTING STENT PLACEMENT  4/19/2012    JEREMY x 2 to LCx     IMPLANT IMPLANTABLE CARDIOVERTER DEFIBRILLATOR  5-    ppm/aicd     KNEE SURGERY      right and left knee surgeries     LAPAROSCOPIC ASSISTED COLECTOMY Right 2/1/2019    Procedure: Laparoscopic Converted to Open Transverse Colectomy with Lysis of Adhesions;  Surgeon: Chanelle Guzmán  "MD;  Location: [facility] OR     LAPAROSCOPIC ASSISTED COLECTOMY LEFT (DESCENDING)  4/8/2014    Procedure: LAPAROSCOPIC ASSISTED COLECTOMY LEFT (DESCENDING);  Hand assisted Laparoscopic Sigmoid Colectomy , Laparoscopic mobilization of spleenic flexure *Latex Allergy*Anesthesia General with Block;  Surgeon: Chanelle Guzmán MD;  Location: [facility] OR     REPAIR VALVE MITRAL  2006     30-mm Medtronic Julian ring      SELECTIVE LASER TRABECULOPLASTY (SLT) OD (RIGHT EYE)  4/10, 1/12,+1/9/13    RE     SELECTIVE LASER TRABECULOPLASTY (SLT) OS (LEFT EYE)  5/2012     SHOULDER SURGERY      right rotator cuff         Family History:  Family History   Problem Relation Age of Onset     C.A.D. Father      Anesthesia Reaction No family hx of      Crohn's Disease No family hx of      Ulcerative Colitis No family hx of      Cancer - colorectal No family hx of      Macular Degeneration No family hx of      Cancer No family hx of         No known family hx of skin cancer     Melanoma No family hx of      Skin Cancer No family hx of          Allergies:  Allergies   Allergen Reactions     Latex Rash     Rash        Medications:  I have reviewed this patient's medication profile and medications from this hospitalization.   Noted scheduled meds are:  APAP 975mg QID  Lidocaine patch  Mirtazapine 15mg at bedtime  Sertraline 100mg daily  Senna    Noted PRN meds are:  Hydromorphone 0.2mg PRN   Robaxin PRN  Oxycodone 2.5-5mg q3h PRN     Physical Exam:  Vital Signs: Temp: 96.2  F (35.7  C) Temp src: Oral BP: 122/68 Pulse: 64 Heart Rate: 66 Resp: 18 SpO2: 99 % O2 Device: None (Room air)    Weight: 0 lbs 0 oz   General: Alert, comfortable appearing man in no acute distress.   ENT: Pupils equal, sclera clear.   Resp: Unlabored on room air.   Ext: Leg not examined.   Neuro: Alert, oriented, appropriately interactive. Clear sensorium.     Data reviewed:  Recent imaging reviewed, comments on pertinents:   XR femur: \"Impression:   Redemonstration of " "shortened/proximally migrated and varus angulation  of the intertrochanteric fracture of the left proximal femur. \"    Recent lab data reviewed, comments on pertinents:   GFR 57  "

## 2019-03-14 NOTE — PLAN OF CARE
/68 (BP Location: Left arm)   Pulse 64   Temp 96.2  F (35.7  C) (Oral)   Resp 18   SpO2 99%    AVSS. Alert and oriented x 4.  To OR via bed at 1530.   Has being NPO since MN.   Voided 150cc at 1530.

## 2019-03-14 NOTE — PROGRESS NOTES
Orthopaedic Surgery Daily Progress Note     Subjective: Noe Florence is a 83 year old male s/p fall with intertrochanteric fracture on 3/12. Pain is well controlled. Currently NPO. No fever, chills. No chest pain or shortness of breath. No abdominal pain, nausea, vomiting. No diarrhea or constipation. No urinary difficulties.     Physical Exam   /54 (BP Location: Left arm)   Pulse 71   Temp 97  F (36.1  C) (Oral)   Resp 16   SpO2 98%     Intake/Output Summary (Last 24 hours) at 3/13/2019 0602  Last data filed at 3/13/2019 0441  Gross per 24 hour   Intake 954.58 ml   Output 500 ml   Net 454.58 ml     General: Alert, well-appearing  in no acute distress.  Respiratory: Non-labored breathing.   Cardiovascular: Extremities warm and well perfused   Extremities: moving all four extremities.   LLE:  -Sens: SILT dp/sp/s/s/t  -Motor: 5/5 EHL/FHL/TA/GSc  -Vasc: 2+ pulses, wwp, brisk cap refill    Dressing CDI    Skin: As noted above. No rashes or lesions appreciated.    Labs    Complete Blood Count   Recent Labs   Lab 03/13/19  0526 03/12/19  1450   WBC 8.0 7.9   HGB 7.8* 7.8*    236     Basic Metabolic Panel  Recent Labs   Lab 03/13/19  0526 03/12/19  1450    143   POTASSIUM 4.3 4.5   CHLORIDE 112* 110*   CO2 23 23   BUN 38* 44*   CR 1.28* 1.43*   * 100*     Coagulation Profile  Recent Labs   Lab 03/13/19  1259 03/13/19  0959 03/13/19  0526 03/12/19  1450   INR 1.59* 1.67* 1.81* 2.35*     Assessment/Plan:  Noe Florence is a 83 year old male with PMH of  Anemia, CAD, on coumadin  s/p fall with severe pain found to have displaced intertrochanteric femur fracture currently being medically optimized prior to OR today with Dr. Avalos     -Latest INR 1.59, 1 pm yesterday      Orthopedic Surgery Consulting  He wishes to proceed with surgery in order to restore his mobility. We will plan for left hip IM nail today with Dr. Avalos.  -Bedrest.   -NPO at midnight   -Continue IV fluids and monitor Cr  closely due to ARSALAN.   -Administer 1U PRBC tonight.  -He has been cleared by internal medicine.   -Will need pacemaker eval pre op. Spoke with anesthesiologist on call and pre op nurse, CRNA or anesthesiologist will call Pacemaker nurse to evaluate as needed.    -Informed consent signed and in chart.     Tremayne Qiu MD   Orthopaedic Surgery Resident, PGY-1  P: [phone]

## 2019-03-14 NOTE — PROGRESS NOTES
[facility], [place]  Trauma Service Progress Note    Date of Service (when I saw the patient): 03/14/2019     Assessment & Plan   Trauma mechanism: Mechanical fall on icy surface  Time/date of injury: 03/12/19 about 1pm-[place]  Known Injuries:  1. Intertrochanteric fracture of the left proximal femur  Other diagnoses:   1. Acute traumatic pain  2. Acute blood loss on anemia of chronic illness  3. Coagulopathy-On chronic Warfarin therapy  4. Afib/flutter  5. ARSALAN  6. Hx CAD, CABG, PCI with JEREMY  7. AICD  8. Recent open transverse colectomy for colon cancer  02/19      Procedure: pending per Orthopedic surgery- postponed yesterday  Plan:  1. Tertiary exam completed, negative for other trauma.  2. Femur fracture: Orthopedic consult, plan again for OR this afternoon  1. Keep NPO for now and continue MIVF  2. NWB to the left lower extremity, keep on bedrest, diligent skin cares given prolonged bedrest, turn and reposition q 1 -2 hours, heels off bed  3. MIVF while NPO  4. Hold PTA Warfarin and ASA  5. Appreciate medicine clearance for OR today  3. Acute traumatic pain: PRN Tylenol, Oxycodone and hydromorphone  4. Coagulopathy: INR therapeutic at 2.3, Vitamin K 5mg x 3 doses given. Goal INR pre-op <1.5. 1.6 today  5. Anemia: Per chart review he has been in the 7-8 range since his last surgery in February. 7.8 on admission. Anticipate blood loss secondary to trauma and coagulation status. Receive 1 unit PRBC on admission per Ortho request. Stable in the mid 7 range. Hemodynamically stable. Left thigh and lower extremity with chronic edema, possibly increased increase trauma. No concern for compartment syndrome. +2 pulses palpable distal pulses. +CMS.  1. Plan to trend post-op  2. Will have a unit on hold for OR  6. Cardiac issues:  Denies any preceding symptoms prior to the fall, denies chest pain, palpitations or shortness of breath. Pre-op EKG obtained and reviewed  1. Resumed betablocker and  Statin.  2. Hold ASA. and Cardura until post op  7. Resp: no acute issues,routine pulmonary toilet  8. Home meds: Zoloft and home vitamins.   9. FEN/GI:   1. ARSALAN resolving with IV hydration, monitor  2. Continue MIVF while NPO.   3. Albumin low, and Vit D level ordered  4. Monitor electrolytes closley  10. PT/OT post op  11. SW discharge planning  12. Palliative care consult: routine given multiple co-morbidities on recent major trauma     General Cares:               PPI/H2 blocker:  n/a              DVT prophylaxis: Mechanical until post op              Bowel Regimen/Date of last stool: 03/12, ordered              Pulmonary toilet: IS              ETOH screen completed 03/12              Lines / drains: PIV     Code status:  Full code              Discharge goals:     Adequate pain management: Ongoing    VSS x24 hours: ongoing    Hemoglobin stable x 48 hours: ongoing    Ambulating safely and/or therapy evals complete: pending post op    Drains/lines removed or plan in place to manage: yes    Teaching done: ongong    Other:  Expected D/C date: 1-2 days post op    Interval History   Reports that he has been waiting patiently for surgery. Denies any new pain or shortness of breath. States he was able to eat late last night. Denies chest pain, numbness or tingling of his extremities.   ROS x 8 negative with exception of those things listed in interval hx    Physical Exam   Temp: 96.2  F (35.7  C) Temp src: Oral BP: 122/68 Pulse: 64 Heart Rate: 66 Resp: 18 SpO2: 99 % O2 Device: None (Room air)    There were no vitals filed for this visit.  Vital Signs with Ranges  Temp:  [96.2  F (35.7  C)-97.2  F (36.2  C)] 96.2  F (35.7  C)  Pulse:  [64-71] 64  Heart Rate:  [66-73] 66  Resp:  [16-18] 18  BP: ()/(49-68) 122/68  SpO2:  [98 %-99 %] 99 %  I/O last 3 completed shifts:  In: 1605 [P.O.:330; I.V.:1275]  Out: 1125 [Urine:1125]    Viji Coma Scale - Total 15/15    Constitutional: Awake, alert, cooperative, no apparent  distress.  Eyes: Lids and lashes normal, pupils equal, round and reactive to light  ENT: Normocephalic, atraumatic  Respiratory: No increased work of breathing, good air exchange, clear to auscultation bilaterally, no crackles or wheezing.  Cardiovascular:  regular rate and rhythm, normal S1 and S2, no S3 or S4, and no murmur.   GI: Normal bowel sounds, abdomen soft, non-distended, non-tender, no guarding  Genitourinary:  Not seen  Skin:  Chronic mykel appearance of the left lower extremity with mild edema. Warm but not hot to touch.  Musculoskeletal: left hip tender to palpation   Neurologic: Awake, alert, oriented. Cranial nerves II-XII are grossly intact.  Strength and sensory is intact. No focal deficits.  Neuropsychiatric: Calm, normal eye contact, alert, affect appropriate to situation, oriented, thought process normal.    MARIA LUISA Leonard CNP  To contact the trauma service use job code pager [phone],   Numeric texts or alpha text through [facility]

## 2019-03-14 NOTE — PROGRESS NOTES
[facility], [place]    Medicine Consult Progress Note - Hospitalist Service, Gold 1       Date of Admission:  3/12/2019  Assessment & Plan   Noe Florence is a 83 year old male admitted on 3/12/2019. He has a history of CKD, CAD s/p stenting and CABG, pacemaker and ICD for NSVT, DVT in 2010, anemia, colon cancer s/p sigmoid colectomy and recent transverse colectomy, atrial fibrillation (on coumadin), and is admitted with an intertrochanteric fracture of the left femur following a fall.    Main Plans for Today:  - To OR for fixation with Orthopedic Surgery  - Medicine to continue to follow post-procedure     1) Intertrochanteric fracture of left femur - Sustained following fall on the ice 3/12/2019.  - Trauma and orthopedics managing.  - On warfarin for atrial fibrillation. Holding. Received Vitamin K 3/12 and again this morning.  - No current contraindications to surgery.  Patient already on a beta blocker.  No recent ischemic symptoms.  - NSQIP predicted mortality is 0.2%, cardiac complication 0.3%, 60% chance of discharge to rehab.  He has tolerated surgeries quite well in the past including recent colon resection.     2) Atrial fibrillation  - Reversing warfarin as above. Continue to hold  - Hold home ASA  - Would continue home metoprolol     3) History of CKD - Cr 1.43 on admission which is on par with past values. Received IV fluids overnight with improvement in creatinine.   - Would follow daily BMP     4) History of CAD - S/p CABG x2 and stenting x2.  Prior ischemic symptoms have included a sense of unusual fullness, but he has not experienced this for close to a year.  - Would continue ASA once able per Ortho.  - Continue beta blocker     5) History of depression  - Continue home mirtazapine, sertaline    Thank you for allowing us to participate in Mr. Florence's care. We will continue to follow along with you.        Diet: NPO per Anesthesia Guidelines for Procedure/Surgery  Except for: Meds    DVT Prophylaxis: Defer to primary service  Dominguez Catheter: not present  Code Status: Full Code      Disposition Plan   Expected discharge: Per Primary Service  Entered: Praveen Styles MD 03/14/2019, 2:54 PM       The patient's care was discussed with the Bedside Nurse, Care Coordinator/ and Patient.    Praveen Styles MD  Hospitalist Service, [address], [place]  Pager: [phone]  Please see sticky note for cross cover information  ______________________________________________________________________    Interval History   No acute events overnight. Did not go to OR yesterday, plan is for later today. Denies any significant discomfort. Plans on trying to only take oral medications post procedure. No shortness of breath or chest discomfort.    4 Point Review of Systems otherwise negative.     Data reviewed today: I reviewed all medications, new labs and imaging results over the last 24 hours. I personally reviewed no images or EKG's today.    Physical Exam   Vital Signs: Temp: 96.2  F (35.7  C) Temp src: Oral BP: 122/68 Pulse: 64 Heart Rate: 66 Resp: 18 SpO2: 99 % O2 Device: None (Room air)    Weight: 0 lbs 0 oz  General: AAOx3, NAD   CV: RRR, normal S1S2, no murmur, clicks, rubs   Resp: Clear to auscultation bilaterally, no wheezes, rhonchi   Abd: Soft, non-tender, BS+, no masses appreciated   Extremities: Radial and pedal pulses intact and symmetric, mild L>R pedal edema   Neuro: No lateralizing symptoms or focal neurologic deficits     Data   Recent Labs   Lab 03/14/19  1042 03/13/19  1259 03/13/19  0959 03/13/19  0526 03/12/19  1450   WBC 13.1*  --   --  8.0 7.9   HGB 7.6*  --   --  7.8* 7.8*   MCV 91  --   --  90 92     --   --  206 236   INR 1.60* 1.59* 1.67* 1.81* 2.35*     --   --  144 143   POTASSIUM 4.0  --   --  4.3 4.5   CHLORIDE 112*  --   --  112* 110*   CO2 24  --   --  23 23   BUN 27  --   --  38* 44*   CR 1.17   --   --  1.28* 1.43*   ANIONGAP 8  --   --  9 9   KEITH 8.3*  --   --  8.1* 8.6   *  --   --  111* 100*   ALBUMIN  --   --   --  2.9*  --

## 2019-03-15 ENCOUNTER — APPOINTMENT (OUTPATIENT)
Dept: PHYSICAL THERAPY | Facility: CLINIC | Age: 84
DRG: 481 | End: 2019-03-15
Attending: NURSE PRACTITIONER
Payer: COMMERCIAL

## 2019-03-15 ENCOUNTER — APPOINTMENT (OUTPATIENT)
Dept: OCCUPATIONAL THERAPY | Facility: CLINIC | Age: 84
DRG: 481 | End: 2019-03-15
Attending: NURSE PRACTITIONER
Payer: COMMERCIAL

## 2019-03-15 LAB
ANION GAP SERPL CALCULATED.3IONS-SCNC: 8 MMOL/L (ref 3–14)
BUN SERPL-MCNC: 26 MG/DL (ref 7–30)
CALCIUM SERPL-MCNC: 8.3 MG/DL (ref 8.5–10.1)
CHLORIDE SERPL-SCNC: 112 MMOL/L (ref 94–109)
CO2 SERPL-SCNC: 23 MMOL/L (ref 20–32)
CREAT SERPL-MCNC: 1.03 MG/DL (ref 0.66–1.25)
DEPRECATED CALCIDIOL+CALCIFEROL SERPL-MC: 32 UG/L (ref 20–75)
ERYTHROCYTE [DISTWIDTH] IN BLOOD BY AUTOMATED COUNT: 16.3 % (ref 10–15)
ERYTHROCYTE [DISTWIDTH] IN BLOOD BY AUTOMATED COUNT: 16.3 % (ref 10–15)
GFR SERPL CREATININE-BSD FRML MDRD: 66 ML/MIN/{1.73_M2}
GLUCOSE SERPL-MCNC: 102 MG/DL (ref 70–99)
HCT VFR BLD AUTO: 22.8 % (ref 40–53)
HCT VFR BLD AUTO: 23.3 % (ref 40–53)
HGB BLD-MCNC: 7.1 G/DL (ref 13.3–17.7)
HGB BLD-MCNC: 7.1 G/DL (ref 13.3–17.7)
INR PPP: 1.48 (ref 0.86–1.14)
INTERPRETATION ECG - MUSE: NORMAL
MAGNESIUM SERPL-MCNC: 2.1 MG/DL (ref 1.6–2.3)
MCH RBC QN AUTO: 28.6 PG (ref 26.5–33)
MCH RBC QN AUTO: 29.1 PG (ref 26.5–33)
MCHC RBC AUTO-ENTMCNC: 30.5 G/DL (ref 31.5–36.5)
MCHC RBC AUTO-ENTMCNC: 31.1 G/DL (ref 31.5–36.5)
MCV RBC AUTO: 93 FL (ref 78–100)
MCV RBC AUTO: 94 FL (ref 78–100)
PLATELET # BLD AUTO: 196 10E9/L (ref 150–450)
PLATELET # BLD AUTO: 205 10E9/L (ref 150–450)
POTASSIUM SERPL-SCNC: 4.3 MMOL/L (ref 3.4–5.3)
RBC # BLD AUTO: 2.44 10E12/L (ref 4.4–5.9)
RBC # BLD AUTO: 2.48 10E12/L (ref 4.4–5.9)
SODIUM SERPL-SCNC: 142 MMOL/L (ref 133–144)
VIT B12 SERPL-MCNC: 2782 PG/ML (ref 193–986)
WBC # BLD AUTO: 12 10E9/L (ref 4–11)
WBC # BLD AUTO: 12.3 10E9/L (ref 4–11)

## 2019-03-15 PROCEDURE — 80048 BASIC METABOLIC PNL TOTAL CA: CPT | Performed by: STUDENT IN AN ORGANIZED HEALTH CARE EDUCATION/TRAINING PROGRAM

## 2019-03-15 PROCEDURE — 12000001 ZZH R&B MED SURG/OB UMMC

## 2019-03-15 PROCEDURE — 97161 PT EVAL LOW COMPLEX 20 MIN: CPT | Mod: GP

## 2019-03-15 PROCEDURE — 82607 VITAMIN B-12: CPT | Performed by: STUDENT IN AN ORGANIZED HEALTH CARE EDUCATION/TRAINING PROGRAM

## 2019-03-15 PROCEDURE — 83735 ASSAY OF MAGNESIUM: CPT | Performed by: STUDENT IN AN ORGANIZED HEALTH CARE EDUCATION/TRAINING PROGRAM

## 2019-03-15 PROCEDURE — 25000132 ZZH RX MED GY IP 250 OP 250 PS 637: Performed by: NURSE PRACTITIONER

## 2019-03-15 PROCEDURE — 85027 COMPLETE CBC AUTOMATED: CPT | Performed by: SURGERY

## 2019-03-15 PROCEDURE — 36415 COLL VENOUS BLD VENIPUNCTURE: CPT | Performed by: STUDENT IN AN ORGANIZED HEALTH CARE EDUCATION/TRAINING PROGRAM

## 2019-03-15 PROCEDURE — 99233 SBSQ HOSP IP/OBS HIGH 50: CPT | Performed by: NURSE PRACTITIONER

## 2019-03-15 PROCEDURE — 97165 OT EVAL LOW COMPLEX 30 MIN: CPT | Mod: GO

## 2019-03-15 PROCEDURE — 85027 COMPLETE CBC AUTOMATED: CPT | Performed by: STUDENT IN AN ORGANIZED HEALTH CARE EDUCATION/TRAINING PROGRAM

## 2019-03-15 PROCEDURE — 97530 THERAPEUTIC ACTIVITIES: CPT | Mod: GP

## 2019-03-15 PROCEDURE — 25000132 ZZH RX MED GY IP 250 OP 250 PS 637: Performed by: SURGERY

## 2019-03-15 PROCEDURE — 97116 GAIT TRAINING THERAPY: CPT | Mod: GP

## 2019-03-15 PROCEDURE — 99232 SBSQ HOSP IP/OBS MODERATE 35: CPT | Performed by: INTERNAL MEDICINE

## 2019-03-15 PROCEDURE — 97535 SELF CARE MNGMENT TRAINING: CPT | Mod: GO

## 2019-03-15 PROCEDURE — 82306 VITAMIN D 25 HYDROXY: CPT | Performed by: STUDENT IN AN ORGANIZED HEALTH CARE EDUCATION/TRAINING PROGRAM

## 2019-03-15 PROCEDURE — 97530 THERAPEUTIC ACTIVITIES: CPT | Mod: GO

## 2019-03-15 PROCEDURE — 25000128 H RX IP 250 OP 636: Performed by: STUDENT IN AN ORGANIZED HEALTH CARE EDUCATION/TRAINING PROGRAM

## 2019-03-15 PROCEDURE — 85610 PROTHROMBIN TIME: CPT | Performed by: STUDENT IN AN ORGANIZED HEALTH CARE EDUCATION/TRAINING PROGRAM

## 2019-03-15 PROCEDURE — 25800030 ZZH RX IP 258 OP 636: Performed by: STUDENT IN AN ORGANIZED HEALTH CARE EDUCATION/TRAINING PROGRAM

## 2019-03-15 RX ORDER — WARFARIN SODIUM 5 MG/1
5 TABLET ORAL
Status: COMPLETED | OUTPATIENT
Start: 2019-03-15 | End: 2019-03-15

## 2019-03-15 RX ORDER — ACETAMINOPHEN 325 MG/1
975 TABLET ORAL EVERY 8 HOURS
Status: DISCONTINUED | OUTPATIENT
Start: 2019-03-15 | End: 2019-03-19 | Stop reason: HOSPADM

## 2019-03-15 RX ADMIN — CYANOCOBALAMIN TAB 1000 MCG 1000 MCG: 1000 TAB at 09:17

## 2019-03-15 RX ADMIN — MULTIPLE VITAMINS W/ MINERALS TAB 1 TABLET: TAB at 09:16

## 2019-03-15 RX ADMIN — WARFARIN SODIUM 5 MG: 5 TABLET ORAL at 19:59

## 2019-03-15 RX ADMIN — SERTRALINE HYDROCHLORIDE 100 MG: 100 TABLET ORAL at 19:59

## 2019-03-15 RX ADMIN — ATORVASTATIN CALCIUM 40 MG: 40 TABLET, FILM COATED ORAL at 09:17

## 2019-03-15 RX ADMIN — SENNOSIDES AND DOCUSATE SODIUM 1 TABLET: 8.6; 5 TABLET ORAL at 19:59

## 2019-03-15 RX ADMIN — ACETAMINOPHEN 975 MG: 325 TABLET, FILM COATED ORAL at 03:16

## 2019-03-15 RX ADMIN — METOPROLOL TARTRATE 25 MG: 25 TABLET ORAL at 19:59

## 2019-03-15 RX ADMIN — CYANOCOBALAMIN TAB 1000 MCG 1000 MCG: 1000 TAB at 19:59

## 2019-03-15 RX ADMIN — METHOCARBAMOL 750 MG: 750 TABLET, FILM COATED ORAL at 14:57

## 2019-03-15 RX ADMIN — CALCIUM 500 MG: 500 TABLET ORAL at 17:25

## 2019-03-15 RX ADMIN — ENOXAPARIN SODIUM 40 MG: 40 INJECTION SUBCUTANEOUS at 14:58

## 2019-03-15 RX ADMIN — ACETAMINOPHEN 975 MG: 325 TABLET, FILM COATED ORAL at 13:04

## 2019-03-15 RX ADMIN — SENNOSIDES AND DOCUSATE SODIUM 2 TABLET: 8.6; 5 TABLET ORAL at 09:18

## 2019-03-15 RX ADMIN — ACETAMINOPHEN 975 MG: 325 TABLET, FILM COATED ORAL at 19:59

## 2019-03-15 RX ADMIN — CALCIUM 500 MG: 500 TABLET ORAL at 09:17

## 2019-03-15 RX ADMIN — CEFAZOLIN 1 G: 1 INJECTION, POWDER, FOR SOLUTION INTRAMUSCULAR; INTRAVENOUS at 11:24

## 2019-03-15 RX ADMIN — MIRTAZAPINE 15 MG: 15 TABLET, FILM COATED ORAL at 22:06

## 2019-03-15 RX ADMIN — VITAMIN D, TAB 1000IU (100/BT) 1000 UNITS: 25 TAB at 19:59

## 2019-03-15 RX ADMIN — METHOCARBAMOL 750 MG: 750 TABLET, FILM COATED ORAL at 09:15

## 2019-03-15 RX ADMIN — CEFAZOLIN 1 G: 1 INJECTION, POWDER, FOR SOLUTION INTRAMUSCULAR; INTRAVENOUS at 02:00

## 2019-03-15 RX ADMIN — SODIUM CHLORIDE, POTASSIUM CHLORIDE, SODIUM LACTATE AND CALCIUM CHLORIDE: 600; 310; 30; 20 INJECTION, SOLUTION INTRAVENOUS at 03:17

## 2019-03-15 RX ADMIN — LIDOCAINE 1 PATCH: 560 PATCH PERCUTANEOUS; TOPICAL; TRANSDERMAL at 09:19

## 2019-03-15 RX ADMIN — VITAMIN D, TAB 1000IU (100/BT) 1000 UNITS: 25 TAB at 09:16

## 2019-03-15 RX ADMIN — METOPROLOL TARTRATE 25 MG: 25 TABLET ORAL at 12:34

## 2019-03-15 ASSESSMENT — ACTIVITIES OF DAILY LIVING (ADL)
ADLS_ACUITY_SCORE: 19
ADLS_ACUITY_SCORE: 18
ADLS_ACUITY_SCORE: 19

## 2019-03-15 NOTE — PROGRESS NOTES
03/15/19 1400   Quick Adds   Type of Visit Initial Occupational Therapy Evaluation   Living Environment   Lives With significant other   Living Arrangements house   Home Accessibility stairs to enter home;stairs within home   Transportation Anticipated family or friend will provide   Living Environment Comment Patient lives with significant other. Bedroom is upstairs. Has walk in shower with shower chair. RTS with handles on toilet.    Self-Care   Usual Activity Tolerance good   Current Activity Tolerance fair   Equipment Currently Used at Home shower chair;raised toilet  (has cane)   Activity/Exercise/Self-Care Comment Patient was playing tennis until age of 80.    Functional Level   Ambulation 0-->independent   Transferring 0-->independent   Toileting 1-->assistive equipment  (RTS)   Bathing 1-->assistive equipment  (shower chair)   Dressing 0-->independent   Eating 0-->independent   Communication 0-->understands/communicates without difficulty   Swallowing 0-->swallows foods/liquids without difficulty   Cognition 0 - no cognition issues reported   Fall history within last six months no   General Information   Onset of Illness/Injury or Date of Surgery - Date 03/14/19   Referring Physician Dr. Avalos   Patient/Family Goals Statement Did not endorse, in agreement with OT related goals.    Additional Occupational Profile Info/Pertinent History of Current Problem s/p Open Reduction Internal Fixation Left Femur Intramedullar Nailing   Precautions/Limitations fall precautions   Weight-Bearing Status - LLE weight-bearing as tolerated   Cognitive Status Examination   Orientation orientation to person, place and time   Level of Consciousness alert   Cognitive Comment no concerns   Visual Perception   Visual Perception Wears glasses  (reading, had cataract surgery)   Sensory Examination   Sensory Quick Adds No deficits were identified   Pain Assessment   Patient Currently in Pain Yes, see Vital Sign flowsheet   Range of  Motion (ROM)   ROM Comment B UE's WFL   Strength   Strength Comments UE strength WFL   Muscle Tone Assessment   Muscle Tone Quick Adds No deficits were identified   Coordination   Upper Extremity Coordination No deficits were identified   Mobility   Bed Mobility Comments Supine>sit with mod A.    Transfer Skill: Bed to Chair/Chair to Bed   Level of [place]: Bed to Chair minimum assist (75% patients effort)   Physical Assist/Nonphysical Assist: Bed to Chair set-up required;verbal cues   Weight-Bearing Restrictions weight-bearing as tolerated   Assistive Device - Transfer Skill Bed to Chair Chair to Bed Rehab Eval rolling walker   Transfer Skill: Sit to Stand   Level of [place]: Sit/Stand minimum assist (75% patients effort)   Physical Assist/Nonphysical Assist: Sit/Stand set-up required;verbal cues   Transfer Skill: Sit to Stand weight-bearing as tolerated   Assistive Device for Transfer: Sit/Stand rolling walker   Toilet Transfer   Toilet Transfer Comments Did not observe during eval   Upper Body Dressing   Level of [place]: Dress Upper Body stand-by assist   Physical Assist/Nonphysical Assist: Dress Upper Body set-up required   Lower Body Dressing   Level of [place]: Dress Lower Body moderate assist (50% patients effort)   Physical Assist/Nonphysical Assist: Dress Lower Body set-up required   Toileting   Level of [place]: Toilet moderate assist (50% patients effort)   Physical Assist/Nonphysical Assist: Toilet set-up required   Eating/Self Feeding   Level of [place]: Eating independent   Activities of Daily Living Analysis   Impairments Contributing to Impaired Activities of Daily Living balance impaired;pain;ROM decreased;strength decreased   General Therapy Interventions   Planned Therapy Interventions ADL retraining;transfer training   Clinical Impression   Criteria for Skilled Therapeutic Interventions Met yes, treatment indicated   OT Diagnosis Decreased functional mobility and  "ADLs   Influenced by the following impairments pain, decreased strength and ROM L LE   Assessment of Occupational Performance 3-5 Performance Deficits   Identified Performance Deficits dressing, bathing, toileting, transfers, home mgmt   Clinical Decision Making (Complexity) Low complexity   Therapy Frequency 5 times/wk   Predicted Duration of Therapy Intervention (days/wks) 1 week   Anticipated Equipment Needs at Discharge shower chair;reacher;sock aide;raised toilet seat  (TBD)   Anticipated Discharge Disposition Transitional Care Facility   Risks and Benefits of Treatment have been explained. Yes   Patient, Family & other staff in agreement with plan of care Yes   [facility] TM \"6 Clicks\"   2016, Trustees of [facility], under license to [employer].  All rights reserved.   6 Clicks Short Forms Daily Activity Inpatient Short Form   [facility]  \"6 Clicks\" Daily Activity Inpatient Short Form   1. Putting on and taking off regular lower body clothing? 2 - A Lot   2. Bathing (including washing, rinsing, drying)? 2 - A Lot   3. Toileting, which includes using toilet, bedpan or urinal? 2 - A Lot   4. Putting on and taking off regular upper body clothing? 4 - None   5. Taking care of personal grooming such as brushing teeth? 4 - None   6. Eating meals? 4 - None   Daily Activity Raw Score (Score out of 24.Lower scores equate to lower levels of function) 18   Total Evaluation Time   Total Evaluation Time (Minutes) 8     "

## 2019-03-15 NOTE — OP NOTE
Procedure Date: 03/14/2019      PREOPERATIVE DIAGNOSIS:  Left intertrochanteric 4 fragment fracture.      POSTOPERATIVE DIAGNOSIS:  Left intertrochanteric 4 fragment fracture.      PROCEDURE:  Left femur IM nailing.      SURGEON:  Srikanth Avalos MD      ASSISTANT:  Cristian Almaraz MD, PGY5, Orthopedic resident.      COMPLICATIONS:  None.      DRAINS:  None.      ESTIMATED BLOOD LOSS:  200 mL.      ANESTHESIA:  General endotracheal.      INDICATIONS:  Please refer to hospital chart for further details discussing the case of Mr. Florence.      DESCRIPTION OF PROCEDURE:  On 03/14/2019, patient was taken to surgery.  Preoperative antibiotics were administered to the patient prior to arrival to the OR.      After successful induction of general endotracheal anesthesia, he was placed supine on the operating table.  The left lower extremity was prepped and draped in sterile fashion.      The pause for the case was performed according to the institution's policy which confirmed laterality of the procedure.      Under arthroscopic control, we proceeded with identification of the entry point for the greater trochanter.  Eventually a K wire was placed which was eventually proximally reamed.  We proceeded with placement of a guidewire into the canal and eventually we reamed up to 12.5 mm with the plan of putting a [employer] long gamma nail of 420 x 11 mm in size.  Eventually the nail was passed into the canal and then we proceeded with placement of a lag screw into the femoral neck which was aimed to be in a center-center position.      Eventually this was overdrilled and we had excellent purchase of the placement of the lag screw.      Finally, we proceeded with placement of a distal locking screw through a dynamic hole with a free hand technique which also was done without any complications.     AP and lateral fluoroscopic examination of the left femur was performed. It confirmed excellent alignment of the fracture site and  location of the hardware. These images were sent to PACS for definitive documentation.     Copious irrigation was applied.  We proceeded with closure of the wound in layers.  Sterile dressings were applied and patient was transferred in stable condition to PACU.      PLAN:  The patient will remain weightbearing as tolerated with the use of the leg without any restrictions.  Physical therapy will be started without any restrictions on postop day #1.      The patient will proceed with DVT prophylaxis for Lovenox 0.5 mg/kg per day x6 weeks.  The patient will be reevaluated in 2 weeks for suture [facility] which will be followed by a 6-week appointment.  At that time, AP and lateral x-rays of the left femur will be obtained, and based on those findings, further recommendations will be given to the patient.            LEANDRO TURPIN MD             D: 03/15/2019   T: 03/15/2019   MT: ally      Name:     LEROY MACHADO   MRN:      [id number]        Account:        [id number]   :      1935           Procedure Date: 2019      Document: [id number]

## 2019-03-15 NOTE — PLAN OF CARE
Now POD#1 Open Reduction Internal Fixation Left Femur Intramedullar Nailing. A&O. BP low, MD notified, 500ml bolus given which increased BP to 111/66, decreased again later, no symptoms present. OVSS, >94% on RA, Capno in place. Using IS independently. Pain controlled with scheduled tylenol and intermittent ice, also has robaxin, oxy and dilaudid that could be helpful today. L surgical dressings CDI. Pt voiding via urinal. Had large BM overnight. BL PIVs, LR at 100/hr, ancef q8h. PT/OT ordered. Continue with POC.

## 2019-03-15 NOTE — PHARMACY-ANTICOAGULATION SERVICE
Clinical Pharmacy - Warfarin Dosing Consult     Pharmacy has been consulted to manage this patient s warfarin therapy.  Indication: Atrial Fibrillation  Therapy Goal: INR 2-3  Provider/Team: Trauma Service  OP Anticoag Clinic:  Anticoagulation Clinic  Warfarin Prior to Admission: Yes  Warfarin PTA Regimen: Most recent regimen: Take 2.5 mg on Tues and Sat, and 5 mg all other days of the week  Significant drug interactions: Aspirin, mirtazapine, sertraline, may all increase the risk of bleeding  Recent documented change in oral intake/nutrition: Unknown  Dose Comments: INR 1.48 today, will resume home regimen and give 5 mg tonight    INR   Date Value Ref Range Status   03/15/2019 1.48 (H) 0.86 - 1.14 Final   03/14/2019 1.60 (H) 0.86 - 1.14 Final       Recommend warfarin 5 mg today.  Pharmacy will monitor Noe Florence daily and order warfarin doses to achieve specified goal.      Please contact pharmacy as soon as possible if the warfarin needs to be held for a procedure or if the warfarin goals change.

## 2019-03-15 NOTE — PLAN OF CARE
"Discharge Planner OT   Patient plan for discharge: open to TCU  Current status: Patient hesitant but agreeable to getting up (\"I just had surgery yesterday\"). Supine>sit with mod A. Sit>stand and transfer to chair with heavy min A using FWW. L LE weak and patient not confident with putting weight into it. Sitting in chair at end of session, call light in reach.   Barriers to return to prior living situation: pain, decreased activity tolerance and strength  Recommendations for discharge: TCU  Rationale for recommendations: To maximize safety and IND with functional mobility and ADLs/IADLs       Entered by: SONYA CARLSON HELD 03/15/2019 2:38 PM       "

## 2019-03-15 NOTE — ANESTHESIA CARE TRANSFER NOTE
Patient: Noe Florence    Procedure(s):  Open Reduction Internal Fixation Left Femur Intramedullar Nailing    Diagnosis: Left Femur Fracture  Diagnosis Additional Information: No value filed.    Anesthesia Type:   No value filed.     Note:  Airway :Face Mask  Patient transferred to:PACU  Comments: VSS. Breathing spontaneously at a regular rate with adequate tidal volumes and maintaining O2 sats on 4L face mask. Denies nausea or pain. No apparent complications from anesthesia.     Omero Roberson MD, MSc.  Anesthesia Resident, CA-1  Handoff Report: Identifed the Patient, Identified the Reponsible Provider, Reviewed the pertinent medical history, Discussed the surgical course, Reviewed Intra-OP anesthesia mangement and issues during anesthesia, Set expectations for post-procedure period and Allowed opportunity for questions and acknowledgement of understanding      Vitals: (Last set prior to Anesthesia Care Transfer)    CRNA VITALS  3/14/2019 1849 - 3/14/2019 1932      3/14/2019             Resp Rate (observed):  12                Electronically Signed By: Omero Roberson MD  March 14, 2019  7:32 PM

## 2019-03-15 NOTE — ANESTHESIA POSTPROCEDURE EVALUATION
Anesthesia POST Procedure Evaluation    Patient: Noe Florence   MRN:     [id number] Gender:   male   Age:    83 year old :      1935        Preoperative Diagnosis: Left Femur Fracture   Procedure(s):  Open Reduction Internal Fixation Left Femur Intramedullar Nailing   Postop Comments: No value filed.       Anesthesia Type:  General    Reportable Event: NO     PAIN: Uncomplicated   Sign Out status: Comfortable, Well controlled pain     PONV: No PONV   Sign Out status:  No Nausea or Vomiting     Neuro/Psych: Uneventful perioperative course   Sign Out Status: Preoperative baseline; Age appropriate mentation     Airway/Resp.: Uneventful perioperative course   Sign Out Status: Non labored breathing, age appropriate RR; Resp. Status within EXPECTED Parameters     CV: Uneventful perioperative course   Sign Out status: Appropriate BP and perfusion indices; Appropriate HR/Rhythm     Disposition:   Sign Out in:  PACU  Disposition:  Floor  Recovery Course: Uneventful  Follow-Up: Not required           Last Anesthesia Record Vitals:  CRNA VITALS  3/14/2019 1849 - 3/14/2019 1936      3/14/2019             Resp Rate (observed):  12          Last PACU/Preop Vitals:  Vitals:    19 0300 19 0801 19 1550   BP: 123/54 122/68 140/69   Pulse:  64 64   Resp: 16 18 16   Temp: 36.1  C (97  F) 35.7  C (96.2  F) 36.9  C (98.4  F)   SpO2: 98% 99% 99%         Electronically Signed By: Laurie Romero MD, 2019, 7:36 PM

## 2019-03-15 NOTE — PROGRESS NOTES
Social Work Services Progress Note    Hospital Day: 4  Date of Initial Social Work Evaluation:  3/12/19- please see for details  Collaborated with:  Chart review, team rounds, Trauma team, pt, pt's significant other Rica, nursing [facility]    Data:  Pt is a 83 year old male admitted from home after falling and sustaining a hip fracture.     TCU is recommended for pt and per Trauma team pt could be ready for discharge Sunday/Monday. Pt had provided TCU choices to the SW in the ED.     Intervention:  SW met with pt and his significant other in pt's room to introduce self, check in, and discuss discharge planning. Pt was in a chair eating and pleasantly engaged.   Rica discussed the recommendations for TCU and that she and pt have had some time to think about this and have TCU choices. Rica confirmed the conversation with the ED SW and she and pt confirmed the following TCU choices:  - [facility] (top choice)- Rica discussed how pt was at [facility] after a cancer surgery and it is very conveniently located. SW explained that [facility] is currently full with a wait list and pt would like to get on the wait list. SW initiated referral via conversation with Kelsi in admissions, she will review pt, waiting to hear back.   - [facility] (p. [phone], f. [phone])- spoke with Lily in admissions and there is no bed availability plus a wait list, referral sent via [facility] [facility].   - [facility] (p. [phone], f. [phone])- left  for Marilin Fry in admissions, referral sent via [employer] [facility], waiting to hear back.     Pt and Rica reported that if none of the above choices will work they would like referrals to the following TCUs:  - [facility] (p. [phone], f. [phone])- spoke with Yeimi and there may be bed availability and she asked that referral be sent, referral sent via [facility] [facility], waiting to hear back. Kathy in admissions called back reporting pt is accepted.   - [facility] (p.  [phone], f. [phone])- spoke with Neeru in admissions and facility doesn't do weekend admissions but there would possibly be a bed availability Monday and she is willing to review pt's referral, referral sent via [email or web address] [facility]), waiting to hear back.     PAS completed in anticipation of discharge- confirmation code: [id number].    Assessment:  See bedside RN, PT/OT, medical team notes    Plan:    Anticipated Disposition:  Facility:  TCU, location to be determined    Barriers to d/c plan:  Medical stability, bed availability/acceptance    Follow Up:  TBD, ACACIA will continue to follow    YUE Villeda, [facility] Medical CenterSW     [phone] (pager) [phone]  3/15/2019

## 2019-03-15 NOTE — PROGRESS NOTES
[facility], [place]  Trauma Service Progress Note    Date of Service (when I saw the patient): 03/15/2019     Assessment & Plan   Trauma mechanism: Mechanical fall on icy surface  Time/date of injury: 03/12/19 about 1pm-[place]  Known Injuries:  1. Intertrochanteric fracture of the left proximal femur    Other diagnoses:   1. Acute traumatic pain  2. Acute blood loss on anemia of chronic illness  3. Coagulopathy-On chronic Warfarin therapy  4. Afib/flutter  5. ARSALAN  6. Hx CAD, CABG, PCI with JEREMY  7. AICD  8. Recent open transverse colectomy for colon cancer  02/19      Procedure: ORIF 3/14    Plan:  1. Tertiary exam completed, negative for other trauma.    2. Femur fracture: Orthopedic consult, surgical fixation performed 3/14  1. WBAT to LLE; PT/OT POD #1   2. Hold PTA Warfarin and ASA; Lovenox today at 1500; resume warfarin tonight   3. Follow up in clinic with Dr. Avalos in two weeks   4. Pain control     3. Acute traumatic pain: Scheduled Tylenol and lidocaine patches. PRN Oxycodone, robaxin and hydromorphone     4. Coagulopathy: INR 1.48 today.  Will monitor hgb for 1 day and resume warfarin POD #1; can hold POD #2 if hgb drops.  (INR therapeutic at 2.3 on admission, Vitamin K 5mg x 3 doses given.)     5. Anemia: Per chart review he has been in the 7-8 range since his last surgery in February. 7.8 on admission. Anticipate blood loss secondary to trauma and coagulation status. Received 1 unit PRBC on admission per Ortho request. Stable for last 2 hgb checks at 7.1;  Hemodynamically stable. Left thigh and lower extremity with chronic edema. No concern for compartment syndrome. +2 pulses palpable distal pulses. +CMS.  1. Continue to trend hgb Q 12 hours and PRN     6. Cardiac issues:  Denies any preceding symptoms prior to the fall, denies chest pain, palpitations or shortness of breath. Pre-op EKG obtained and reviewed  1. Resumed betablocker and Statin.  2. Hold ASA and Cardura.  Resume  Cardura today as BP allows     7. Resp: no acute issue. Contnue routine pulmonary toilet    8. Home meds: Zoloft and home vitamins.     9. FEN/GI:   1. ARSALAN; now resolved.  discontinue MIVF as diet improves and encourage PO intake.   2. Vit B12 and Vit D levels ordered (in process)   3. Electrolyte monitoring and replacement per protocol     10. PT/OT post op    11. SW discharge planning    12. Palliative care consult: routine given multiple co-morbidities on recent major trauma     General Cares:               PPI/H2 blocker:  n/a              DVT prophylaxis: Mechanical until post op; lovenox 3/15; warfarin 3/15               Bowel Regimen/Date of last stool: 03/15; on bowel regimen               Pulmonary toilet: IS              ETOH screen completed 03/12              Lines / drains: PIV     Code status:  Full code              Discharge goals:     Adequate pain management: YES     VSS x24 hours: ongoing    Hemoglobin stable x 48 hours: ongoing    Ambulating safely and/or therapy evals complete: ongoing     Drains/lines removed or plan in place to manage: yes    Teaching done: ongong    Other:  Expected D/C date: 1-2 days post op; Anticipate TCU at discharge     Interval History   Doing well this AM.  Pain well controlled. Up to chair with therapy and tolerating well.  Using IS independently.  Denies SOB or chest pain.  Incision looks good.   ROS x 8 negative with exception of those things listed in interval hx    Physical Exam   Temp: 96.3  F (35.7  C) Temp src: Axillary BP: 125/55 Pulse: 60 Heart Rate: 60 Resp: 14 SpO2: 98 % O2 Device: None (Room air) Oxygen Delivery: 6 LPM  There were no vitals filed for this visit.  Vital Signs with Ranges  Temp:  [96  F (35.6  C)-98.4  F (36.9  C)] 96.3  F (35.7  C)  Pulse:  [] 60  Heart Rate:  [] 60  Resp:  [12-20] 14  BP: ()/(43-82) 125/55  SpO2:  [94 %-100 %] 98 %  I/O last 3 completed shifts:  In: 3080 [P.O.:480; I.V.:2600]  Out: 600 [Urine:400;  Blood:200]    Viji Coma Scale - Total 15/15    Constitutional: Awake, alert, cooperative, no apparent distress.  Eyes: Lids and lashes normal, pupils equal, round and reactive to light  ENT: Normocephalic, atraumatic  Respiratory: No increased work of breathing, good air exchange, clear to auscultation bilaterally, no crackles or wheezing.  Cardiovascular:  Irregularly irregular rate; controlled rhythm, normal S1 and S2, no S3 or S4, and no murmur.   GI: Normal bowel sounds, abdomen soft, non-distended, non-tender, no guarding  Genitourinary: No urine to assess   Skin:  Chronic mykel appearance of the left lower extremity with mild edema. Incision dressing CDI   Musculoskeletal: left hip tender to palpation; no calf tenderness, pulses intact    Neurologic: Awake, alert, oriented. Cranial nerves II-XII are grossly intact.  Strength and sensory is intact. No focal deficits.  Neuropsychiatric: Calm, normal eye contact, alert, affect appropriate to situation, oriented, thought process normal.    MARIA LUISA Chavez CNP  To contact the trauma service use job code pager [phone],   Numeric texts or alpha text through [facility]

## 2019-03-15 NOTE — PLAN OF CARE
Pain managed with scheduled Tylenol and Robaxin TID prn. Up in chair x2 with assist of 1-2, gait belt and walker. Incision covered with dressing-clean,dry and intact. Good CMS in extremities. Lidocaine patch on L thigh. Diet advanced to regular and patient tolerating well. Pt's significant other here this afternoon.   No

## 2019-03-15 NOTE — PLAN OF CARE
Discharge Planner PT   Patient plan for discharge: open to TCU if needed  Current status: PT: Eval completed, treat initiated. Pt very pleasant and agreeable to working with PT, getting up to chair. Pt needed mod A sup>sit transfer with step by step directions and moving in R hooklying with most A for L due to pain. Pt tolerated sitting EOB well as painis mostly just during moving/transfers. Pt needed mod A sit<>stand to wh walker with step by step directions for positioning and heavy min A to mod A to step turn to chair again with step by step sequence. Pt seated in chair, working on incentive spirometer and agreeable to sit 20-30 min, RN aware and will A back to bed with 1-2 and wh walker.   Barriers to return to prior living situation: current functional status significantly below baseline, medical status  Recommendations for discharge: TCU  Rationale for recommendations: Pt will benefit from continued rehab PT/OT to progress functional mobility and ADLs to safely discharge home again with wife to provide some assist for household activities, meals but not able to provide much physical assist       Entered by: Andressa Montero 03/15/2019 11:05 AM

## 2019-03-15 NOTE — PROGRESS NOTES
[facility], [place]    Medicine Consult Progress Note - Hospitalist Service, Gold 1       Date of Admission:  3/12/2019  Assessment & Plan   Noe Florence is a 83 year old male admitted on 3/12/2019. He has a history of CKD, CAD s/p stenting and CABG, pacemaker and ICD for NSVT, DVT in 2010, anemia, colon cancer s/p sigmoid colectomy and recent transverse colectomy, atrial fibrillation (on coumadin), and is admitted with an intertrochanteric fracture of the left femur following a fall now POD#1.    Main Plans for Today:  - Went to OR yesterday  - Resume warfarin when safe from surgical standpoint, does not require bridging for atrial fibrillation alone  - Resume aspirin when safe from surgical standpoint     1) Intertrochanteric fracture of left femur - Sustained following fall on the ice 3/12/2019.  - Trauma and orthopedics managing.  - On warfarin for atrial fibrillation. Received Vitamin K prior to surgery. Resume when safe from surgical standpoint. Does not require bridging.  - Resume aspirin when safe from surgical standpoint     2) Atrial fibrillation  - Resume warfarin when safe from surgical standpoint, does not require bridging for atrial fibrillation  - Resume home ASA when able  - Would continue home metoprolol     3) History of CKD - Cr 1.43 on admission which is on par with past values. Received IV fluids overnight on admission and while NPO. Creatinine trended down to normal.      4) History of CAD - S/p CABG x2 and stenting x2.  Prior ischemic symptoms have included a sense of unusual fullness, but he has not experienced this for close to a year.  - Would continue ASA once able per Ortho.  - Continue beta blocker     5) History of depression  - Continue home mirtazapine, sertaline    Thank you for allowing us to participate in Mr. Florence's care. Internal Medicine will sign off, however, please do not hesitate to call with questions or concerns. I will be on service  through Monday 3/18 and can be reached at my pager below.        Diet: Regular Diet Adult    DVT Prophylaxis: Defer to primary service  Dominguez Catheter: not present  Code Status: Full Code      Disposition Plan   Expected discharge: Per Primary Service  Entered: Praveen Styles MD 03/15/2019, 10:49 AM       The patient's care was discussed with the Bedside Nurse, Care Coordinator/ and Patient and Primary Team.    Praveen Styles MD  Hospitalist Service, [address], [place]  Pager: [phone]  Please see sticky note for cross cover information  ______________________________________________________________________    Interval History   No acute events overnight. Went OR yesterday and no acute complaints today. Feels pretty well but has yet to test his legs with getting up and getting to a chair. Denies any chest pain, discomfort, or shortness of breath. Otherwise feels well..    4 Point Review of Systems otherwise negative.     Data reviewed today: I reviewed all medications, new labs and imaging results over the last 24 hours. I personally reviewed no images or EKG's today.    Physical Exam   Vital Signs: Temp: 96.3  F (35.7  C) Temp src: Axillary BP: 125/55 Pulse: 60 Heart Rate: 60 Resp: 14 SpO2: 98 % O2 Device: None (Room air) Oxygen Delivery: 6 LPM  Weight: 0 lbs 0 oz  General: AAOx3, NAD   CV: RRR, normal S1S2, no murmur, clicks, rubs   Resp: Clear to auscultation bilaterally, no wheezes, rhonchi   Abd: Soft, non-tender, BS+, no masses appreciated   Extremities: Radial and pedal pulses intact and symmetric, mild L>R pedal edema   Neuro: No lateralizing symptoms or focal neurologic deficits     Data   Recent Labs   Lab 03/15/19  0647 03/15/19  0055 03/14/19  2221 03/14/19  1042 03/13/19  1259  03/13/19  0526   WBC 12.0* 12.3*  --  13.1*  --   --  8.0   HGB 7.1* 7.1*  --  7.6*  --   --  7.8*   MCV 94 93  --  91  --   --  90    196 191 201  --   --  206    INR 1.48*  --   --  1.60* 1.59*   < > 1.81*     --   --  144  --   --  144   POTASSIUM 4.3  --   --  4.0  --   --  4.3   CHLORIDE 112*  --   --  112*  --   --  112*   CO2 23  --   --  24  --   --  23   BUN 26  --   --  27  --   --  38*   CR 1.03  --  1.11 1.17  --   --  1.28*   ANIONGAP 8  --   --  8  --   --  9   KEITH 8.3*  --   --  8.3*  --   --  8.1*   *  --   --  106*  --   --  111*   ALBUMIN  --   --   --   --   --   --  2.9*    < > = values in this interval not displayed.

## 2019-03-15 NOTE — BRIEF OP NOTE
[facility], [place]    Brief Operative Note    Pre-operative diagnosis: Left Femur Fracture  Post-operative diagnosis * No post-op diagnosis entered *  Procedure: Procedure(s):  Open Reduction Internal Fixation Left Femur Intramedullar Nailing  Surgeon: Surgeon(s) and Role:     * Srikanth Avalos MD - Primary     * Cristian Almaraz MD - Resident - Assisting     * Tremayne Qiu MD - Resident - Observing  Anesthesia: General   Estimated blood loss: Less than 50 ml  Drains: None  Specimens: * No specimens in log *  Findings:   as anticipated.  Complications: None.  Implants: Chelsey IMN      Post-op Plan:  -Activity: WBAT LLE  -DVT ppx: per trauma team  -Antibiotics: 24hr coverage periop  -Pain control: IV/PO  -Diet: ADAT  -Dressings: change as needed, keep in place for 2 days initially  -Drain: none  -Xrays: none  -Labs: hgb postop  -Dispo: per trauma team  -Follow-up: 2 weeks w Dr. Avalos in clinic.    Cristian Almaraz MD  PGY-5, Orthopaedic Surgery  [phone]

## 2019-03-16 ENCOUNTER — APPOINTMENT (OUTPATIENT)
Dept: PHYSICAL THERAPY | Facility: CLINIC | Age: 84
DRG: 481 | End: 2019-03-16
Payer: COMMERCIAL

## 2019-03-16 LAB
BLD PROD TYP BPU: NORMAL
BLD UNIT ID BPU: 0
BLOOD PRODUCT CODE: NORMAL
BPU ID: NORMAL
HGB BLD-MCNC: 6.2 G/DL (ref 13.3–17.7)
HGB BLD-MCNC: 7.7 G/DL (ref 13.3–17.7)
INR PPP: 1.59 (ref 0.86–1.14)
TRANSFUSION STATUS PATIENT QL: NORMAL
TRANSFUSION STATUS PATIENT QL: NORMAL

## 2019-03-16 PROCEDURE — 86923 COMPATIBILITY TEST ELECTRIC: CPT | Performed by: NURSE PRACTITIONER

## 2019-03-16 PROCEDURE — 85610 PROTHROMBIN TIME: CPT | Performed by: STUDENT IN AN ORGANIZED HEALTH CARE EDUCATION/TRAINING PROGRAM

## 2019-03-16 PROCEDURE — 36415 COLL VENOUS BLD VENIPUNCTURE: CPT | Performed by: NURSE PRACTITIONER

## 2019-03-16 PROCEDURE — 86901 BLOOD TYPING SEROLOGIC RH(D): CPT | Performed by: NURSE PRACTITIONER

## 2019-03-16 PROCEDURE — 86900 BLOOD TYPING SEROLOGIC ABO: CPT | Performed by: NURSE PRACTITIONER

## 2019-03-16 PROCEDURE — 25000132 ZZH RX MED GY IP 250 OP 250 PS 637: Performed by: SURGERY

## 2019-03-16 PROCEDURE — 25000132 ZZH RX MED GY IP 250 OP 250 PS 637: Performed by: NURSE PRACTITIONER

## 2019-03-16 PROCEDURE — 85018 HEMOGLOBIN: CPT | Performed by: STUDENT IN AN ORGANIZED HEALTH CARE EDUCATION/TRAINING PROGRAM

## 2019-03-16 PROCEDURE — P9016 RBC LEUKOCYTES REDUCED: HCPCS | Performed by: NURSE PRACTITIONER

## 2019-03-16 PROCEDURE — 85018 HEMOGLOBIN: CPT | Performed by: NURSE PRACTITIONER

## 2019-03-16 PROCEDURE — 12000001 ZZH R&B MED SURG/OB UMMC

## 2019-03-16 PROCEDURE — 86850 RBC ANTIBODY SCREEN: CPT | Performed by: NURSE PRACTITIONER

## 2019-03-16 PROCEDURE — 97116 GAIT TRAINING THERAPY: CPT | Mod: GP

## 2019-03-16 PROCEDURE — 97530 THERAPEUTIC ACTIVITIES: CPT | Mod: GP

## 2019-03-16 PROCEDURE — 99233 SBSQ HOSP IP/OBS HIGH 50: CPT | Performed by: NURSE PRACTITIONER

## 2019-03-16 RX ORDER — WARFARIN SODIUM 5 MG/1
5 TABLET ORAL
Status: COMPLETED | OUTPATIENT
Start: 2019-03-16 | End: 2019-03-16

## 2019-03-16 RX ADMIN — ACETAMINOPHEN 975 MG: 325 TABLET, FILM COATED ORAL at 18:59

## 2019-03-16 RX ADMIN — CALCIUM 500 MG: 500 TABLET ORAL at 09:08

## 2019-03-16 RX ADMIN — METHOCARBAMOL 750 MG: 750 TABLET, FILM COATED ORAL at 10:55

## 2019-03-16 RX ADMIN — WARFARIN SODIUM 5 MG: 5 TABLET ORAL at 18:59

## 2019-03-16 RX ADMIN — VITAMIN D, TAB 1000IU (100/BT) 1000 UNITS: 25 TAB at 20:44

## 2019-03-16 RX ADMIN — METHOCARBAMOL 750 MG: 750 TABLET, FILM COATED ORAL at 15:27

## 2019-03-16 RX ADMIN — MIRTAZAPINE 15 MG: 15 TABLET, FILM COATED ORAL at 21:02

## 2019-03-16 RX ADMIN — SERTRALINE HYDROCHLORIDE 100 MG: 100 TABLET ORAL at 20:44

## 2019-03-16 RX ADMIN — METHOCARBAMOL 750 MG: 750 TABLET, FILM COATED ORAL at 20:44

## 2019-03-16 RX ADMIN — METOPROLOL TARTRATE 25 MG: 25 TABLET ORAL at 09:34

## 2019-03-16 RX ADMIN — LIDOCAINE 1 PATCH: 560 PATCH PERCUTANEOUS; TOPICAL; TRANSDERMAL at 09:35

## 2019-03-16 RX ADMIN — CALCIUM 500 MG: 500 TABLET ORAL at 18:59

## 2019-03-16 RX ADMIN — ATORVASTATIN CALCIUM 40 MG: 40 TABLET, FILM COATED ORAL at 09:08

## 2019-03-16 RX ADMIN — MULTIPLE VITAMINS W/ MINERALS TAB 1 TABLET: TAB at 09:08

## 2019-03-16 RX ADMIN — SENNOSIDES AND DOCUSATE SODIUM 1 TABLET: 8.6; 5 TABLET ORAL at 20:44

## 2019-03-16 RX ADMIN — ACETAMINOPHEN 975 MG: 325 TABLET, FILM COATED ORAL at 11:02

## 2019-03-16 RX ADMIN — CYANOCOBALAMIN TAB 1000 MCG 1000 MCG: 1000 TAB at 20:44

## 2019-03-16 RX ADMIN — VITAMIN D, TAB 1000IU (100/BT) 1000 UNITS: 25 TAB at 09:08

## 2019-03-16 RX ADMIN — ACETAMINOPHEN 975 MG: 325 TABLET, FILM COATED ORAL at 03:22

## 2019-03-16 RX ADMIN — METOPROLOL TARTRATE 25 MG: 25 TABLET ORAL at 20:44

## 2019-03-16 ASSESSMENT — ACTIVITIES OF DAILY LIVING (ADL)
ADLS_ACUITY_SCORE: 17
ADLS_ACUITY_SCORE: 19
ADLS_ACUITY_SCORE: 17

## 2019-03-16 NOTE — PROGRESS NOTES
Orthopaedic Surgery Progress Note 03/16/2019    S: Pain controlled in hip. Tolerating PO, voiding. Was up with walker.    O:  Temp: 96.1  F (35.6  C) Temp src: Oral BP: 119/54 Pulse: 63 Heart Rate: 63 Resp: 16 SpO2: 100 % O2 Device: None (Room air) Oxygen Delivery: 3 LPM    Exam:  Gen: No acute distress, resting comfortably in bed.  Resp: Non-labored breathing  MSK:  LLE:  - Dressings c/d/i  - SILT femoral/tibial/sural/saphenous/DP/SP nerves  - Fires TA, EHL, FHL, GaSC  - PT/DP pulses 2+, foot wwp      Recent Labs   Lab 03/16/19  0708 03/15/19  0647 03/15/19  0055 03/14/19  2221 03/14/19  1042   WBC  --  12.0* 12.3*  --  13.1*   HGB 6.2* 7.1* 7.1*  --  7.6*   PLT  --  205 196 191 201       Assessment: Noe Florence is a 83 year old male s/p left femur IMN on 3/15/19 with Dr. Avalos    Plan:  -Activity: WBAT LLE  -DVT ppx: per trauma team  -Antibiotics: 24hr coverage periop  -Pain control: IV/PO  -Diet: ADAT  -Dressings: change as needed, keep in place for 2 days initially  -Drain: none  -Xrays: none  -Labs: hgb postop  -Dispo: per trauma team  -Follow-up: 2 weeks w Dr. Avalos in clinic.      Mili Kwon MD 03/16/2019  Orthopaedic Surgery, PGY-5  Pager: [phone]

## 2019-03-16 NOTE — PROGRESS NOTES
[facility], [place]  Trauma Service Progress Note    Date of Service (when I saw the patient): 03/16/2019     Assessment & Plan   Trauma mechanism: Mechanical fall on icy surface  Time/date of injury: 03/12/19 about 1pm-[place]  Known Injuries:  1. Intertrochanteric fracture of the left proximal femur    Other diagnoses:   1. Acute traumatic pain  2. Acute blood loss on anemia of chronic illness  3. Coagulopathy-On chronic Warfarin therapy  4. Afib/flutter  5. ARSALAN  6. Hx CAD, CABG, PCI with JEREMY  7. AICD  8. Recent open transverse colectomy for colon cancer  02/19      Procedure: ORIF 3/14    Plan:  1. Tertiary exam completed, negative for other trauma.    2. Femur fracture: Orthopedic consult, surgical fixation performed 3/14  1. WBAT to LLE; PT/OT    2. Hold Lovenox in setting of blood transfusion today; warfarin resumed 3/15; will recheck hemoglobin and if stable continue warfarin today.  INR subtherapeutic and anticipate it to stay subtherapeutic even with a dose today   3. Follow up in clinic with Dr. Avalos in two weeks   4. Pain control     3. Acute traumatic pain: Scheduled Tylenol and lidocaine patches. PRN Oxycodone, robaxin and hydromorphone     4. Coagulopathy: INR 1.59 today.  Warfarin resumed 3/15.  INR remains sub-therapeutic. May hold dose today due to drop in hemoglobin.  (INR therapeutic at 2.3 on admission, Vitamin K 5mg x 3 doses given.)     5. Anemia: Per chart review he has been in the 7-8 range since his last surgery in February. 7.8 on admission. Anticipate blood loss secondary to trauma and coagulation status. Received 1 unit PRBC on admission per Ortho request. Received one additional unit PRBC 3/16 for Hgb 6.2 (down from 7.1 previous 2 days);  Hemodynamically stable. Left thigh and lower extremity with chronic edema. No concern for compartment syndrome. +2 pulses palpable distal pulses. +CMS.  1. Continue to trend hgb Q 12 hours and PRN   2. Repeat hemoglobin at 1400  after transfusion     6. Cardiac issues:  Denies any preceding symptoms prior to the fall, denies chest pain, palpitations or shortness of breath. Pre-op EKG obtained and reviewed  1. Resumed betablocker and Statin.  2. Hold ASA and Cardura.  Resume Cardura as BP allows.  BP low today so will continue to hold      7. Resp: no acute issue. Contnue routine pulmonary toilet    8. Home meds: Zoloft and home vitamins.     9. FEN/GI:   1. ARSALAN; now resolved.  discontinue MIVF as diet improves and encourage PO intake.   2. Vit B12 and Vit D levels ordered. D level WNLs.   B12 elevated.  Takes supplementation daily   3. Electrolyte monitoring and replacement per protocol     10. PT/OT post op    11. SW discharge planning    12. Palliative care consult: routine given multiple co-morbidities on recent major trauma     General Cares:               PPI/H2 blocker:  n/a              DVT prophylaxis: Mechanical until post op; lovenox 3/15; warfarin 3/15               Bowel Regimen/Date of last stool: 03/15; on bowel regimen               Pulmonary toilet: IS              ETOH screen completed 03/12              Lines / drains: PIV     Code status:  Full code              Discharge goals:     Adequate pain management: YES     VSS x24 hours: ongoing    Hemoglobin stable x 48 hours: no     Ambulating safely and/or therapy evals complete: ongoing     Drains/lines removed or plan in place to manage: yes    Teaching done: onitalong    Other:  Expected D/C date: 1-2 days post op; Anticipate TCU at discharge; likely Monday or Tuesday      Interval History   Hemoglobin drop overnight with initiation of lovenox and resumption of Warfarin.  Will hold Lovenox.  Transfuse one unit PRBC.  Doing well this AM.  Pain well controlled.   Using IS independently.  Denies SOB or chest pain.  Incision looks good; some increased swelling in LLE, as to be expected.      ROS x 8 negative with exception of those things listed in interval hx    Physical Exam    Temp: 96.9  F (36.1  C) Temp src: Oral BP: 108/55 Pulse: 98 Heart Rate: 63 Resp: 18 SpO2: 100 % O2 Device: None (Room air) Oxygen Delivery: 3 LPM  There were no vitals filed for this visit.  Vital Signs with Ranges  Temp:  [95.6  F (35.3  C)-96.9  F (36.1  C)] 96.9  F (36.1  C)  Pulse:  [57-98] 98  Heart Rate:  [] 63  Resp:  [16-18] 18  BP: ()/(45-67) 108/55  SpO2:  [97 %-100 %] 100 %  I/O last 3 completed shifts:  In: 370 [P.O.:370]  Out: 75 [Urine:75]    [place] Coma Scale - Total 15/15    Constitutional: Awake, alert, cooperative, no apparent distress.  Eyes: Lids and lashes normal, pupils equal, round and reactive to light  ENT: Normocephalic, atraumatic  Respiratory: No increased work of breathing, good air exchange, clear to auscultation bilaterally, no crackles or wheezing.  Cardiovascular:  Regular rate and rhythm; normal S1 and S2, no S3 or S4, and no murmur.   GI: Normal bowel sounds, abdomen soft, non-distended, non-tender, no guarding  Genitourinary: No urine to assess   Skin:  Chronic mykel appearance of the left lower extremity with mild/mod edema. Incision dressing CDI   Musculoskeletal: left hip tender to palpation; no calf tenderness, pulses intact. Skin edematous but soft and compressible     Neurologic: Awake, alert, oriented. Cranial nerves II-XII are grossly intact.  Strength and sensory is intact. No focal deficits.  Neuropsychiatric: Calm, normal eye contact, alert, affect appropriate to situation, oriented, thought process normal.    MARIA LUISA Chavez CNP  To contact the trauma service use job code pager [phone],   Numeric texts or alpha text through [facility]

## 2019-03-16 NOTE — PLAN OF CARE
Pt denies pain but appears to have some discomfort when moving L leg. Leg leg has mild edema, good CMS and pulses. Dressing on L upper leg and thigh clean, dry and intact. Pt up in chair and walked in fontaine with PT.   Hgb =6.2 today, down from 7.1 yesterday. Transfused with one unit RBCs without difficulty. Repeat Hgb= 7.7.

## 2019-03-16 NOTE — PLAN OF CARE
Patient denies SOB, clear LS, IS done independently. VSS on RA. +BS, +flatus, +BM. Good PO intake. Voids in urinal. LLE wound dressing cdi, +CMS, assist of 2 to transfer, with walker and gait belt. WBAT. Pain is tolerable, took scheduled Tylenol. Continue poc.

## 2019-03-16 NOTE — PLAN OF CARE
Discharge Planner PT   Patient plan for discharge: Pt open to TCU or ARU  Current status: Ambulates x 30 ft with rolling walker and modA1, severely impaired gait pattern, antalgic, offloading LLE. Sit-stand with modA1 and cues. Very flexed posture in standing. High levels of pain but pt motivated to participate.  Barriers to return to prior living situation: Medical, impaired functional mobility  Recommendations for discharge: ARU  Rationale for recommendations: Highly motivated to improve, high PLOF (played tennis for 60 years), significant difference between current and PLOF, multiple therapy needs.       Entered by: Chino Petersen 03/16/2019 3:11 PM

## 2019-03-16 NOTE — PROGRESS NOTES
Social Work Services Progress Note    Hospital Day: 5  Date of Initial Social Work Evaluation:  3/12/19  Collaborated with:  Chart review, trauma team, Pt's sig other Rica    Data:   Pt is a 83 year old male admitted from home after falling and sustaining a hip fracture. TCU is recommended for pt and per Trauma team pt could be ready for discharge Sunday/Monday. Pt had provided TCU choices to the SW in the ED.     Per trauma team today, Pt will not likely be medically stable for discharge until Monday 3/18/19 due to a drop in his hemoglobin. SW updated significant other via phone re: extensive waiting lists of top preference and acceptance to [facility]CU. Pt's significant other expressed that Pt would likely be agreeable to discharge to [facility] if medically ready on Monday and expressed appreciation for the update.     PAS completed in anticipation of discharge- confirmation code: [id number].    TCU referrals:   - [facility] (top choice)- Rica discussed how pt was at  TCU after a cancer surgery and it is very conveniently located. SW explained that  TCU is currently full with a wait list and pt would like to get on the wait list. SW initiated referral via conversation with Kelsi in admissions, she will review pt, waiting to hear back.   - [facility] (p. [phone], f. [phone])- spoke with Lily in admissions and there is no bed availability plus a wait list, referral sent via [facility].   - [facility] (p. [phone], f. [phone])- left vm for Marilin Fry in admissions, referral sent via [employer], waiting to hear back.      Pt and Rica reported that if none of the above choices will work they would like referrals to the following TCUs:  - [facility] (p. [phone], f. [phone])- spoke with Yeimi and there may be bed availability and she asked that referral be sent, referral sent via [employer], waiting to hear back. Kathy in admissions called back  reporting pt is accepted.   - [facility] (p. [phone], f. [phone])- spoke with Neeru in admissions and facility doesn't do weekend admissions but there would possibly be a bed availability Monday and she is willing to review pt's referral, referral sent via truedash, waiting to hear back.     Intervention:  Discharge planning    Assessment:  See bedside notes, PT/OT notes, medical team notes    Plan:    Anticipated Disposition:  Facility:  [facility]    Barriers to d/c plan:  Medical stability    Follow Up:  SW to f/u & assist as needed.    YUE Mendosa, ERAN  7C Surgical/Oncology Unit   Phone: [phone]  Pager: [phone]

## 2019-03-16 NOTE — PROGRESS NOTES
03/15/19 1000   Quick Adds   Type of Visit Initial PT Evaluation   Living Environment   Lives With spouse   Living Arrangements house   Home Accessibility stairs to enter home;stairs within home   Number of Stairs, Main Entrance 3   Stair Railings, Main Entrance none   Number of Stairs, Within Home, Primary other (see comments)  (12)   Stair Railings, Within Home, Primary railing on right side (ascending)   Transportation Anticipated family or friend will provide   Living Environment Comment Pt lives with wife who an kashif twith household and light activities like donning shoes, but pt will need to be fairly I with mobility and ADLs to return home   Self-Care   Usual Activity Tolerance good   Current Activity Tolerance fair   Equipment Currently Used at Home cane, straight;shower chair   Activity/Exercise/Self-Care Comment Pt was very active playign tennis until 81 yo,    Functional Level Prior   Ambulation 1-->assistive equipment   Transferring 0-->independent   Toileting 1-->assistive equipment   Bathing 1-->assistive equipment   Communication 0-->understands/communicates without difficulty   Swallowing 0-->swallows foods/liquids without difficulty   Cognition 0 - no cognition issues reported   Fall history within last six months yes   Number of times patient has fallen within last six months 1   Which of the above functional risks had a recent onset or change? ambulation;transferring   General Information   Onset of Illness/Injury or Date of Surgery - Date 03/12/19   Referring Physician Cristian Almaraz MD   Patient/Family Goals Statement move easier and have less pain with moving   Pertinent History of Current Problem (include personal factors and/or comorbidities that impact the POC) 83 year old male admitted on 3/12/2019. He has a history of CKD, CAD s/p stenting and CABG, pacemaker and ICD for NSVT, DVT in 2010, anemia, colon cancer s/p sigmoid colectomy and recent transverse colectomy, atrial fibrillation  (on coumadin), and is admitted with an intertrochanteric fracture of the left femur following a fall. Pt s/p IMN 3/14   Weight-Bearing Status - LLE weight-bearing as tolerated   Weight-Bearing Status - RLE full weight-bearing   Heart Disease Risk Factors Age   General Info Comments Pt agreeable to PT   Cognitive Status Examination   Orientation orientation to person, place and time   Level of Consciousness alert   Follows Commands and Answers Questions 100% of the time   Personal Safety and Judgment intact   Memory intact   Posture    Posture Forward head position;Protracted shoulders   Range of Motion (ROM)   ROM Comment L LE decresed due to pain, based on functional mobility assessment.   Strength   Strength Comments L LE decresed due to pain, based on functional mobility assessment.   Bed Mobility   Bed Mobility Comments mod A sup>sit   Transfer Skills   Transfer Comments Mod A sit<>stand   Gait   Gait Comments heavy min A to light mod A to step and turn to chair with wh walker   Balance   Balance Comments fair with victoriano hu   General Therapy Interventions   Planned Therapy Interventions bed mobility training;transfer training;gait training;stretching;risk factor education;home program guidelines;progressive activity/exercise;balance training   Clinical Impression   Criteria for Skilled Therapeutic Intervention yes, treatment indicated   PT Diagnosis impaired functional mobility   Influenced by the following impairments decreased strength, balance, ROM, act tolerance   Functional limitations due to impairments decreased I with transfers, bed mob, gait, barrier navigaiton, ADLs   Clinical Presentation Stable/Uncomplicated   Clinical Decision Making (Complexity) Low complexity   Therapy Frequency` (6x/week)   Predicted Duration of Therapy Intervention (days/wks) 3/18   Anticipated Equipment Needs at Discharge walker   Anticipated Discharge Disposition Transitional Care Facility   Risk & Benefits of therapy have  "been explained Yes   Patient, Family & other staff in agreement with plan of care Yes   [facility] AM-PAC TM \"6 Clicks\"   2016, Trustees of [facility], under license to [employer].  All rights reserved.   6 Clicks Short Forms Basic Mobility Inpatient Short Form   [facility] AM-PAC  \"6 Clicks\" V.2 Basic Mobility Inpatient Short Form   1. Turning from your back to your side while in a flat bed without using bedrails? 2 - A Lot   2. Moving from lying on your back to sitting on the side of a flat bed without using bedrails? 2 - A Lot   3. Moving to and from a bed to a chair (including a wheelchair)? 2 - A Lot   4. Standing up from a chair using your arms (e.g., wheelchair, or bedside chair)? 2 - A Lot   5. To walk in hospital room? 2 - A Lot   6. Climbing 3-5 steps with a railing? 1 - Total   Basic Mobility Raw Score (Score out of 24.Lower scores equate to lower levels of function) 11   Total Evaluation Time   Total Evaluation Time (Minutes) 8     "

## 2019-03-17 ENCOUNTER — APPOINTMENT (OUTPATIENT)
Dept: OCCUPATIONAL THERAPY | Facility: CLINIC | Age: 84
DRG: 481 | End: 2019-03-17
Payer: COMMERCIAL

## 2019-03-17 ENCOUNTER — APPOINTMENT (OUTPATIENT)
Dept: PHYSICAL THERAPY | Facility: CLINIC | Age: 84
DRG: 481 | End: 2019-03-17
Payer: COMMERCIAL

## 2019-03-17 LAB
ABO + RH BLD: NORMAL
ABO + RH BLD: NORMAL
ANION GAP SERPL CALCULATED.3IONS-SCNC: 5 MMOL/L (ref 3–14)
BLD GP AB SCN SERPL QL: NORMAL
BLD PROD TYP BPU: NORMAL
BLD PROD TYP BPU: NORMAL
BLD UNIT ID BPU: 0
BLOOD BANK CMNT PATIENT-IMP: NORMAL
BLOOD PRODUCT CODE: NORMAL
BPU ID: NORMAL
BUN SERPL-MCNC: 39 MG/DL (ref 7–30)
CALCIUM SERPL-MCNC: 8.1 MG/DL (ref 8.5–10.1)
CHLORIDE SERPL-SCNC: 112 MMOL/L (ref 94–109)
CO2 SERPL-SCNC: 25 MMOL/L (ref 20–32)
CREAT SERPL-MCNC: 1.29 MG/DL (ref 0.66–1.25)
ERYTHROCYTE [DISTWIDTH] IN BLOOD BY AUTOMATED COUNT: 15.9 % (ref 10–15)
GFR SERPL CREATININE-BSD FRML MDRD: 51 ML/MIN/{1.73_M2}
GLUCOSE SERPL-MCNC: 95 MG/DL (ref 70–99)
HCT VFR BLD AUTO: 20.6 % (ref 40–53)
HGB BLD-MCNC: 6.5 G/DL (ref 13.3–17.7)
HGB BLD-MCNC: 8.1 G/DL (ref 13.3–17.7)
INR PPP: 1.92 (ref 0.86–1.14)
MCH RBC QN AUTO: 29.1 PG (ref 26.5–33)
MCHC RBC AUTO-ENTMCNC: 31.6 G/DL (ref 31.5–36.5)
MCV RBC AUTO: 92 FL (ref 78–100)
NUM BPU REQUESTED: 2
PLATELET # BLD AUTO: 196 10E9/L (ref 150–450)
POTASSIUM SERPL-SCNC: 4.1 MMOL/L (ref 3.4–5.3)
RBC # BLD AUTO: 2.23 10E12/L (ref 4.4–5.9)
SODIUM SERPL-SCNC: 142 MMOL/L (ref 133–144)
SPECIMEN EXP DATE BLD: NORMAL
TRANSFUSION STATUS PATIENT QL: NORMAL
TRANSFUSION STATUS PATIENT QL: NORMAL
WBC # BLD AUTO: 6.8 10E9/L (ref 4–11)

## 2019-03-17 PROCEDURE — 80048 BASIC METABOLIC PNL TOTAL CA: CPT | Performed by: NURSE PRACTITIONER

## 2019-03-17 PROCEDURE — 85610 PROTHROMBIN TIME: CPT | Performed by: NURSE PRACTITIONER

## 2019-03-17 PROCEDURE — 25000132 ZZH RX MED GY IP 250 OP 250 PS 637: Performed by: NURSE PRACTITIONER

## 2019-03-17 PROCEDURE — 12000001 ZZH R&B MED SURG/OB UMMC

## 2019-03-17 PROCEDURE — 40000141 ZZH STATISTIC PERIPHERAL IV START W/O US GUIDANCE

## 2019-03-17 PROCEDURE — P9016 RBC LEUKOCYTES REDUCED: HCPCS | Performed by: NURSE PRACTITIONER

## 2019-03-17 PROCEDURE — 97530 THERAPEUTIC ACTIVITIES: CPT | Mod: GP

## 2019-03-17 PROCEDURE — 99233 SBSQ HOSP IP/OBS HIGH 50: CPT | Performed by: NURSE PRACTITIONER

## 2019-03-17 PROCEDURE — 97535 SELF CARE MNGMENT TRAINING: CPT | Mod: GO | Performed by: OCCUPATIONAL THERAPIST

## 2019-03-17 PROCEDURE — 85018 HEMOGLOBIN: CPT | Performed by: NURSE PRACTITIONER

## 2019-03-17 PROCEDURE — 97116 GAIT TRAINING THERAPY: CPT | Mod: GP

## 2019-03-17 PROCEDURE — 36415 COLL VENOUS BLD VENIPUNCTURE: CPT | Performed by: NURSE PRACTITIONER

## 2019-03-17 PROCEDURE — 85027 COMPLETE CBC AUTOMATED: CPT | Performed by: NURSE PRACTITIONER

## 2019-03-17 PROCEDURE — 85049 AUTOMATED PLATELET COUNT: CPT | Performed by: STUDENT IN AN ORGANIZED HEALTH CARE EDUCATION/TRAINING PROGRAM

## 2019-03-17 RX ORDER — WARFARIN SODIUM 2.5 MG/1
2.5 TABLET ORAL
Status: DISCONTINUED | OUTPATIENT
Start: 2019-03-17 | End: 2019-03-17 | Stop reason: DRUGHIGH

## 2019-03-17 RX ORDER — ASPIRIN 81 MG/1
81 TABLET, CHEWABLE ORAL DAILY
Status: DISCONTINUED | OUTPATIENT
Start: 2019-03-18 | End: 2019-03-19 | Stop reason: HOSPADM

## 2019-03-17 RX ADMIN — METHOCARBAMOL 750 MG: 750 TABLET, FILM COATED ORAL at 15:40

## 2019-03-17 RX ADMIN — METHOCARBAMOL 750 MG: 750 TABLET, FILM COATED ORAL at 22:17

## 2019-03-17 RX ADMIN — CYANOCOBALAMIN TAB 1000 MCG 1000 MCG: 1000 TAB at 09:49

## 2019-03-17 RX ADMIN — METOPROLOL TARTRATE 25 MG: 25 TABLET ORAL at 19:48

## 2019-03-17 RX ADMIN — ACETAMINOPHEN 975 MG: 325 TABLET, FILM COATED ORAL at 11:24

## 2019-03-17 RX ADMIN — MULTIPLE VITAMINS W/ MINERALS TAB 1 TABLET: TAB at 09:49

## 2019-03-17 RX ADMIN — CALCIUM 500 MG: 500 TABLET ORAL at 09:49

## 2019-03-17 RX ADMIN — SENNOSIDES AND DOCUSATE SODIUM 1 TABLET: 8.6; 5 TABLET ORAL at 09:49

## 2019-03-17 RX ADMIN — POLYETHYLENE GLYCOL 3350 17 G: 17 POWDER, FOR SOLUTION ORAL at 11:22

## 2019-03-17 RX ADMIN — ACETAMINOPHEN 975 MG: 325 TABLET, FILM COATED ORAL at 19:48

## 2019-03-17 RX ADMIN — LIDOCAINE 1 PATCH: 560 PATCH PERCUTANEOUS; TOPICAL; TRANSDERMAL at 11:17

## 2019-03-17 RX ADMIN — CALCIUM 500 MG: 500 TABLET ORAL at 18:03

## 2019-03-17 RX ADMIN — ATORVASTATIN CALCIUM 40 MG: 40 TABLET, FILM COATED ORAL at 08:00

## 2019-03-17 RX ADMIN — VITAMIN D, TAB 1000IU (100/BT) 1000 UNITS: 25 TAB at 19:48

## 2019-03-17 RX ADMIN — VITAMIN D, TAB 1000IU (100/BT) 1000 UNITS: 25 TAB at 09:50

## 2019-03-17 RX ADMIN — SERTRALINE HYDROCHLORIDE 100 MG: 100 TABLET ORAL at 19:48

## 2019-03-17 RX ADMIN — SENNOSIDES AND DOCUSATE SODIUM 2 TABLET: 8.6; 5 TABLET ORAL at 19:48

## 2019-03-17 RX ADMIN — METOPROLOL TARTRATE 25 MG: 25 TABLET ORAL at 09:50

## 2019-03-17 RX ADMIN — CYANOCOBALAMIN TAB 1000 MCG 1000 MCG: 1000 TAB at 19:48

## 2019-03-17 RX ADMIN — ACETAMINOPHEN 975 MG: 325 TABLET, FILM COATED ORAL at 02:47

## 2019-03-17 RX ADMIN — MIRTAZAPINE 15 MG: 15 TABLET, FILM COATED ORAL at 22:17

## 2019-03-17 ASSESSMENT — ACTIVITIES OF DAILY LIVING (ADL)
ADLS_ACUITY_SCORE: 17

## 2019-03-17 NOTE — PLAN OF CARE
Discharge Planner OT   Patient plan for discharge: did not discussed  Current status: bed mobility Mari to assist with LLE to EOB. STS to FWW Mari. CGA during standing, tolerating 10 minutes of standing, no LOB, working on increasing weight bearing and weight shifting on LLE. Stand pivot transfer with shuffling steps CGA with use of FWW, limited by pain and fear of falling.   Barriers to return to prior living situation: post surgical condition, pain  Recommendations for discharge: ARU  Rationale for recommendations: pt motivated to participate with good insight into increasing functional mobility and ADLs. Pt is limited by above barriers and continues to be risk for falls with ADLs/IADLs and below baseline. Pt to benefit from continued skilled OT services to maximize safety/I with ADLs/IADLs.        Entered by: Jessica Coughlin 03/17/2019 2:03 PM

## 2019-03-17 NOTE — PROGRESS NOTES
[facility], [place]  Trauma Service Progress Note    Date of Service (when I saw the patient): 03/17/2019     Assessment & Plan   Trauma mechanism: Mechanical fall on icy surface  Time/date of injury: 03/12/19 about 1pm-[place]  Known Injuries:  1. Intertrochanteric fracture of the left proximal femur    Other diagnoses:   1. Acute traumatic pain  2. Acute blood loss on anemia of chronic illness  3. Coagulopathy-On chronic Warfarin therapy  4. Afib/flutter  5. ARSALAN  6. Hx CAD, CABG, PCI with JEREMY  7. AICD  8. Recent open transverse colectomy for colon cancer  02/19      Procedure: ORIF 3/14    Plan:  1. Tertiary exam completed, negative for other trauma.    2. Femur fracture: Orthopedic consult, surgical fixation performed 3/14  1. WBAT to LLE; PT/OT    2. Hold Lovenox in setting of blood transfusion today; warfarin resumed 3/15; hold today in setting of low hemoglobin again today.  INR nearly therapeutic today   3. Follow up in clinic with Dr. Avalos in two weeks   4. Pain control     3. Acute traumatic pain: Scheduled Tylenol and lidocaine patches. PRN Oxycodone, robaxin and hydromorphone. Has not been requiring narcotics     4. Coagulopathy: INR 1.92 today.  Warfarin resumed 3/15.  Hgb 7.7 last evening>6.5 this AM.  Hold this evenings dose of warfarin   5. Anemia: Per chart review he has been in the 7-8 range since his last surgery in February. 7.8 on admission. Anticipate blood loss secondary to trauma and coagulation status. Received 1 unit PRBC on admission per Ortho request. Received one additional unit PRBC 3/16 for Hgb 6.2 (down from 7.1 previous 2 days), and 1 additional unit 3/17 for hgb 6.5.  Had responded appropriately to transfusion yesterday (repeat hgb following transfusion was 7.7).   Hemodynamically stable. Left thigh and lower extremity with increasing edema but soft. No concern for compartment syndrome. +2 pulses palpable distal pulses. +CMS.    Continue to trend hgb daily  and PRN     Repeat hemoglobin at 1400 after transfusion     6. Cardiac issues:  Denies any preceding symptoms prior to the fall, denies chest pain, palpitations or shortness of breath. Pre-op EKG obtained and reviewed  1. Resumed aspirin, betablocker and Statin.  2. Hold Cardura.  Resume Cardura as BP allows.  BP low today so will continue to hold      7. Resp: no acute issue. Contnue routine pulmonary toilet    8. Home meds: Zoloft and home vitamins.     9. FEN/GI:   1. ARSALAN; now resolved.  Encourage PO intake.   2. Vit B12 and Vit D levels ordered. D level WNLs.   B12 elevated.  Takes supplementation daily   3. Electrolyte monitoring and replacement per protocol     10. PT/OT post op    11. SW discharge planning; anticipate discharge Tuesday     12. Palliative care consult: routine given multiple co-morbidities on recent major trauma     General Cares:               PPI/H2 blocker:  n/a              DVT prophylaxis: Mechanical until post op; lovenox 3/15; warfarin 3/15; both now held due to anemia               Bowel Regimen/Date of last stool: 03/15; on bowel regimen               Pulmonary toilet: IS              ETOH screen completed 03/12              Lines / drains: PIV     Code status:  Full code              Discharge goals:     Adequate pain management: YES     VSS x24 hours: yes    Hemoglobin stable x 48 hours: no     Ambulating safely and/or therapy evals complete: ongoing     Drains/lines removed or plan in place to manage: yes    Teaching done: ongong    Other:  Expected D/C date:  Anticipate TCU at discharge; likely Tuesday      Interval History   Hemoglobin drop overnight again.  Will hold Warfarin and Lovenox.  Transfuse one unit PRBC.  More swelling in left leg, but thigh softer and no increased pain or disability.  Doing well this AM.  Pain well controlled.   Using IS independently.  Denies SOB or chest pain.      ROS x 8 negative with exception of those things listed in interval hx    Physical  Exam   Temp: 96.8  F (36  C) Temp src: Axillary BP: 131/58 Pulse: 60 Heart Rate: 74 Resp: 18 SpO2: 100 % O2 Device: None (Room air)    There were no vitals filed for this visit.  Vital Signs with Ranges  Temp:  [95.3  F (35.2  C)-98.1  F (36.7  C)] 96.8  F (36  C)  Pulse:  [60-84] 60  Heart Rate:  [] 74  Resp:  [16-18] 18  BP: ()/(48-68) 131/58  SpO2:  [98 %-100 %] 100 %  I/O last 3 completed shifts:  In: 1290 [P.O.:1040]  Out: 950 [Urine:950]    [place] Coma Scale - Total 15/15    Constitutional: Awake, alert, cooperative, no apparent distress.  Eyes: Lids and lashes normal, pupils equal, round and reactive to light  ENT: Normocephalic, atraumatic  Respiratory: No increased work of breathing, good air exchange, clear to auscultation bilaterally, no crackles or wheezing.  Cardiovascular:  Regular rate and rhythm; normal S1 and S2, no S3 or S4, and no murmur.   GI: Normal bowel sounds, abdomen soft, non-distended, non-tender, no guarding  Genitourinary: No urine to assess   Skin:  Chronic mykel appearance of the left lower extremity with mod edema in LLE. Incision dressing CDI   Musculoskeletal: left hip tender to palpation; no calf tenderness, pulses intact. Skin edematous but soft and compressible     Neurologic: Awake, alert, oriented. Cranial nerves II-XII are grossly intact.  Strength and sensory is intact. No focal deficits.  Neuropsychiatric: Calm, normal eye contact, alert, affect appropriate to situation, oriented, thought process normal.    MARIA LUISA Chavez CNP  To contact the trauma service use job code pager [phone],   Numeric texts or alpha text through [facility]

## 2019-03-17 NOTE — PLAN OF CARE
Hgb=6.5 this AM. Transfused with 1 unit RBCs. Repeat Hgb this afternoon=8.1. Patient denies pain at rest but has pain with movement. He declined Oxycodone and takes the scheduled Tylenol and took Robaxin this afternoon with PT. Eating well. Did not have BM today but took laxatives. He had been declining laxatives. Had large BM two days ago. Lungs clear. Wife here all day. Up in the chair for lunch and walked in the fontaine this afternoon with PT.

## 2019-03-17 NOTE — PHARMACY-ANTICOAGULATION SERVICE
Warfarin Therapy Hold Note  This patient is currently receiving warfarin for Atrial fibrillation.    Goal INR: 2-3  Goal CHROMOGENIC FACTOR 10 goal: not applicable  Goal FACTOR 2:  not applicable    Anticoagulation Dose History     Recent Dosing and Labs Latest Ref Rng & Units 3/13/2019 3/13/2019 3/13/2019 3/14/2019 3/15/2019 3/16/2019 3/17/2019    Warfarin 5 mg - - - - - 5 mg 5 mg -    INR 0.86 - 1.14 1.81(H) 1.67(H) 1.59(H) 1.60(H) 1.48(H) 1.59(H) 1.92(H)    INR Point of Care 0.86 - 1.14 - - - - - - -          Bleeding Signs/Symptoms: baseline chronic anemia, recent trauma and surgery. Hgb dropped to 6.2 g/dL on 3/16 (from 7.1 g/dL on 3/15), improved to 7.7 g/dL after 1 unit of blood. Hgb dropped again today (3/17) to 6.5 g/dL with plan for repeat transfusion.    Assessment:  Current INR is still subtherapeutic, however given Hgb drop x 2 days, will hold warfarin as discussed with Trauma.    Plan:  1) HOLD today s warfarin dose.   An order has been placed in EPIC for  Warfarin- No Dose Today    2) Transfusion per Trauma.  3) Hgb recheck this PM per Trauma. Recheck next INR tomorrow with AM labs.    Trauma is aware of plan.    Jody Batres, PharmD  Pager: [phone] (text capable)    3/17/2019 10:55 AM

## 2019-03-17 NOTE — PLAN OF CARE
Discharge Planner PT   Patient plan for discharge: ARU  Current status: Mobility worse today than yesterday, only able to ambulate 10ft x 2 with rolling walker and CGA. ModA to stand. Reports higher levels of stiffness and pain today. Standing tolerance x 8 min with UE support on walker.  Barriers to return to prior living situation: Medical, significantly impaired functional mobility  Recommendations for discharge: ARU  Rationale for recommendations: Highly motivated, multiple therapy needs, high PLOF, significant difference between current/PLOF, good support system.       Entered by: Chino Petersen 03/17/2019 3:50 PM

## 2019-03-17 NOTE — PLAN OF CARE
Temp: 96.3  F (35.7  C) Temp src: Oral BP: 118/65 Pulse: 60 Heart Rate: 60 Resp: 16 SpO2: 99 % O2 Device: None (Room air)      Time: 1821-1627      Reason for admission: Close left subtrochanteric femur fracture s/p ORIF L femur intramedullar nailing  Vitals: Afeb, AVSS. O2 sats 99% on RA.   Activity: Up w/ A-2 w/ walker & GB. T&R q2-3h.   Pain: Denies pain, on scheduled Tylenol.   Neuro: A&Ox4. Denies numb/tingling. CMS intact.   Cardiac: Pacemaker in place.   Respiratory: LS clear, denies SOB.   GI: + BS, + gas, no BM overnight. Pt was concerned about not having a BM, encouraged to take bowel meds today.   : Voiding adequately in urinal.   Diet: Reg diet, tolerating, denies N/V.   Incisions/Drains: L hip & knee incision covered, CDI. Elevated on pillow. Mepilex on coccyx intact.   IV Access: PIV SL.    New changes this shift: Pt's concerned about not having a BM yesterday. Encouraged to take bowel meds today, education provided.       Plan: Possible discharge to TCU Monday.     Continue to monitor and follow POC

## 2019-03-17 NOTE — PLAN OF CARE
VS: /55 (BP Location: Left arm)   Pulse 84   Temp 95.3  F (35.2  C) (Oral)   Resp 16   SpO2 100%    Neuro: A&Ox4, BLE CMS intact   Cardiac: WDL, pacemaker with HR in 60s, BLE pulses 1+ bilat  Resp: lung sounds clear, no SOB reported, sating well on RA, IS encouraged  GI: passing gas, bowel sounds active and audible, no BM this shift  : voiding spontaneously, marginal amount of yellow urine   Skin: L hip/knee dressings CDI, no drainage noted; scattered bruising; mepilex on coccyx intact, repositioned q2h as tolerated; LLE edema 3+, leg elevated in bed  Pain/Nausea: denies pain, muscle spasms controlled with robaxin x1; denies nausea  LDA: R PIV SL  Diet: regular, good appetite  Mobility: 2 assist with FWW and gait belt  Labs: reviewed, hgb 7.7 after 1 unit of blood administered, INR 1.59, WBC 12.0  Plan: discharge to TCU possibly on Monday, continue plan of care

## 2019-03-18 ENCOUNTER — APPOINTMENT (OUTPATIENT)
Dept: ULTRASOUND IMAGING | Facility: CLINIC | Age: 84
DRG: 481 | End: 2019-03-18
Attending: NURSE PRACTITIONER
Payer: COMMERCIAL

## 2019-03-18 ENCOUNTER — APPOINTMENT (OUTPATIENT)
Dept: PHYSICAL THERAPY | Facility: CLINIC | Age: 84
DRG: 481 | End: 2019-03-18
Payer: COMMERCIAL

## 2019-03-18 LAB
ANION GAP SERPL CALCULATED.3IONS-SCNC: 8 MMOL/L (ref 3–14)
BUN SERPL-MCNC: 43 MG/DL (ref 7–30)
CALCIUM SERPL-MCNC: 8.3 MG/DL (ref 8.5–10.1)
CHLORIDE SERPL-SCNC: 112 MMOL/L (ref 94–109)
CO2 SERPL-SCNC: 23 MMOL/L (ref 20–32)
CREAT SERPL-MCNC: 1.24 MG/DL (ref 0.66–1.25)
ERYTHROCYTE [DISTWIDTH] IN BLOOD BY AUTOMATED COUNT: 16.1 % (ref 10–15)
GFR SERPL CREATININE-BSD FRML MDRD: 53 ML/MIN/{1.73_M2}
GLUCOSE SERPL-MCNC: 101 MG/DL (ref 70–99)
HCT VFR BLD AUTO: 26.1 % (ref 40–53)
HGB BLD-MCNC: 7.9 G/DL (ref 13.3–17.7)
HGB BLD-MCNC: 8 G/DL (ref 13.3–17.7)
INR PPP: 1.83 (ref 0.86–1.14)
MCH RBC QN AUTO: 28.5 PG (ref 26.5–33)
MCHC RBC AUTO-ENTMCNC: 30.7 G/DL (ref 31.5–36.5)
MCV RBC AUTO: 93 FL (ref 78–100)
PLATELET # BLD AUTO: 217 10E9/L (ref 150–450)
POTASSIUM SERPL-SCNC: 4.6 MMOL/L (ref 3.4–5.3)
RADIOLOGIST FLAGS: ABNORMAL
RBC # BLD AUTO: 2.81 10E12/L (ref 4.4–5.9)
SODIUM SERPL-SCNC: 143 MMOL/L (ref 133–144)
WBC # BLD AUTO: 6.9 10E9/L (ref 4–11)

## 2019-03-18 PROCEDURE — 97530 THERAPEUTIC ACTIVITIES: CPT | Mod: GP

## 2019-03-18 PROCEDURE — 12000001 ZZH R&B MED SURG/OB UMMC

## 2019-03-18 PROCEDURE — 97110 THERAPEUTIC EXERCISES: CPT | Mod: GP

## 2019-03-18 PROCEDURE — 36415 COLL VENOUS BLD VENIPUNCTURE: CPT | Performed by: NURSE PRACTITIONER

## 2019-03-18 PROCEDURE — 85610 PROTHROMBIN TIME: CPT | Performed by: NURSE PRACTITIONER

## 2019-03-18 PROCEDURE — 25000132 ZZH RX MED GY IP 250 OP 250 PS 637: Performed by: SURGERY

## 2019-03-18 PROCEDURE — 25000132 ZZH RX MED GY IP 250 OP 250 PS 637: Performed by: NURSE PRACTITIONER

## 2019-03-18 PROCEDURE — 85018 HEMOGLOBIN: CPT | Performed by: NURSE PRACTITIONER

## 2019-03-18 PROCEDURE — 93971 EXTREMITY STUDY: CPT | Mod: LT

## 2019-03-18 PROCEDURE — 80048 BASIC METABOLIC PNL TOTAL CA: CPT | Performed by: NURSE PRACTITIONER

## 2019-03-18 PROCEDURE — 85027 COMPLETE CBC AUTOMATED: CPT | Performed by: NURSE PRACTITIONER

## 2019-03-18 PROCEDURE — 25000128 H RX IP 250 OP 636: Performed by: NURSE PRACTITIONER

## 2019-03-18 PROCEDURE — 99233 SBSQ HOSP IP/OBS HIGH 50: CPT | Performed by: NURSE PRACTITIONER

## 2019-03-18 PROCEDURE — 97116 GAIT TRAINING THERAPY: CPT | Mod: GP

## 2019-03-18 RX ORDER — POLYETHYLENE GLYCOL 3350 17 G/17G
17 POWDER, FOR SOLUTION ORAL DAILY
Status: ON HOLD | DISCHARGE
Start: 2019-03-19 | End: 2019-03-29

## 2019-03-18 RX ORDER — BISACODYL 10 MG
10 SUPPOSITORY, RECTAL RECTAL DAILY PRN
Status: ON HOLD | DISCHARGE
Start: 2019-03-18 | End: 2019-03-29

## 2019-03-18 RX ORDER — LIDOCAINE 4 G/G
1-2 PATCH TOPICAL EVERY 24 HOURS
Status: ON HOLD | DISCHARGE
Start: 2019-03-18 | End: 2019-03-29

## 2019-03-18 RX ORDER — AMOXICILLIN 250 MG
1-2 CAPSULE ORAL 2 TIMES DAILY
Status: ON HOLD | DISCHARGE
Start: 2019-03-18 | End: 2019-03-29

## 2019-03-18 RX ORDER — ACETAMINOPHEN 325 MG/1
975 TABLET ORAL EVERY 8 HOURS
Status: ON HOLD | DISCHARGE
Start: 2019-03-18 | End: 2019-03-29

## 2019-03-18 RX ORDER — METHOCARBAMOL 750 MG/1
750 TABLET, FILM COATED ORAL EVERY 6 HOURS PRN
Status: ON HOLD | DISCHARGE
Start: 2019-03-18 | End: 2019-03-29

## 2019-03-18 RX ORDER — WARFARIN SODIUM 5 MG/1
5 TABLET ORAL
Status: COMPLETED | OUTPATIENT
Start: 2019-03-18 | End: 2019-03-18

## 2019-03-18 RX ORDER — CALCIUM CARBONATE 500(1250)
500 TABLET ORAL 2 TIMES DAILY WITH MEALS
Status: ON HOLD | DISCHARGE
Start: 2019-03-18 | End: 2019-03-29

## 2019-03-18 RX ORDER — DOXAZOSIN 1 MG/1
1 TABLET ORAL DAILY
Status: DISCONTINUED | OUTPATIENT
Start: 2019-03-18 | End: 2019-03-19

## 2019-03-18 RX ADMIN — WARFARIN SODIUM 5 MG: 5 TABLET ORAL at 18:59

## 2019-03-18 RX ADMIN — LIDOCAINE 1 PATCH: 560 PATCH PERCUTANEOUS; TOPICAL; TRANSDERMAL at 08:40

## 2019-03-18 RX ADMIN — SENNOSIDES AND DOCUSATE SODIUM 2 TABLET: 8.6; 5 TABLET ORAL at 08:39

## 2019-03-18 RX ADMIN — METOPROLOL TARTRATE 25 MG: 25 TABLET ORAL at 08:39

## 2019-03-18 RX ADMIN — MULTIPLE VITAMINS W/ MINERALS TAB 1 TABLET: TAB at 08:39

## 2019-03-18 RX ADMIN — DOXAZOSIN 1 MG: 1 TABLET ORAL at 10:47

## 2019-03-18 RX ADMIN — CYANOCOBALAMIN TAB 1000 MCG 1000 MCG: 1000 TAB at 08:39

## 2019-03-18 RX ADMIN — ACETAMINOPHEN 975 MG: 325 TABLET, FILM COATED ORAL at 10:48

## 2019-03-18 RX ADMIN — CALCIUM 500 MG: 500 TABLET ORAL at 08:39

## 2019-03-18 RX ADMIN — VITAMIN D, TAB 1000IU (100/BT) 1000 UNITS: 25 TAB at 20:57

## 2019-03-18 RX ADMIN — CALCIUM 500 MG: 500 TABLET ORAL at 18:58

## 2019-03-18 RX ADMIN — SENNOSIDES AND DOCUSATE SODIUM 1 TABLET: 8.6; 5 TABLET ORAL at 20:59

## 2019-03-18 RX ADMIN — SERTRALINE HYDROCHLORIDE 100 MG: 100 TABLET ORAL at 20:58

## 2019-03-18 RX ADMIN — CYANOCOBALAMIN TAB 1000 MCG 1000 MCG: 1000 TAB at 20:57

## 2019-03-18 RX ADMIN — ATORVASTATIN CALCIUM 40 MG: 40 TABLET, FILM COATED ORAL at 08:40

## 2019-03-18 RX ADMIN — MIRTAZAPINE 15 MG: 15 TABLET, FILM COATED ORAL at 21:05

## 2019-03-18 RX ADMIN — VITAMIN D, TAB 1000IU (100/BT) 1000 UNITS: 25 TAB at 08:40

## 2019-03-18 RX ADMIN — METOPROLOL TARTRATE 25 MG: 25 TABLET ORAL at 20:57

## 2019-03-18 RX ADMIN — ACETAMINOPHEN 975 MG: 325 TABLET, FILM COATED ORAL at 18:58

## 2019-03-18 RX ADMIN — METHOCARBAMOL 750 MG: 750 TABLET, FILM COATED ORAL at 08:40

## 2019-03-18 RX ADMIN — METHOCARBAMOL 750 MG: 750 TABLET, FILM COATED ORAL at 16:59

## 2019-03-18 RX ADMIN — POLYETHYLENE GLYCOL 3350 17 G: 17 POWDER, FOR SOLUTION ORAL at 08:40

## 2019-03-18 RX ADMIN — ASPIRIN 81 MG CHEWABLE TABLET 81 MG: 81 TABLET CHEWABLE at 08:39

## 2019-03-18 RX ADMIN — ENOXAPARIN SODIUM 40 MG: 40 INJECTION SUBCUTANEOUS at 10:48

## 2019-03-18 RX ADMIN — ACETAMINOPHEN 975 MG: 325 TABLET, FILM COATED ORAL at 02:48

## 2019-03-18 ASSESSMENT — ACTIVITIES OF DAILY LIVING (ADL)
ADLS_ACUITY_SCORE: 17
ADLS_ACUITY_SCORE: 17
ADLS_ACUITY_SCORE: 19
ADLS_ACUITY_SCORE: 17

## 2019-03-18 NOTE — PROGRESS NOTES
Rehab Admissions:  Met w/ pt this morning and call his significant other Rica this afternoon to discuss [facility] and to discuss differences between ARC level of care and TCU level of care. After discussion of both levels of care, pt states he is most interested in ARC level of care. [facility] has provided insurance authorization for ARC level of care. Discussed ELOS of ~10 days prior to return home w/ ongoing therapies as needed. 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 Pt motivated, eager to disch to rehab.     Thank you for the referral, we will continue to follow this patient for post acute placement.     Determination of admission is based upon the patient's need for an intensive, interdisciplinary approach to rehabilitation, their ability to progress, their ability to tolerate intensive therapies, their need for daily physician supervision, their need for twenty four hour nursing assistance, and their ability and willingness to participate in such a program.    Barb Bustos CM  Rehab Liaison/  [facility]  3/18/2019    2:33 PM

## 2019-03-18 NOTE — PROGRESS NOTES
[facility], [place]  Trauma Service Progress Note    Date of Service (when I saw the patient): 03/18/2019     Assessment & Plan   Trauma mechanism: Mechanical fall on icy surface  Time/date of injury: 03/12/19 about 1pm-[place]  Known Injuries:  1. Intertrochanteric fracture of the left proximal femur    Other diagnoses:   1. Acute traumatic pain  2. Acute blood loss on anemia of chronic illness  3. Coagulopathy-On chronic Warfarin therapy  4. Hx of DVT; chronic. Initially diagnosed 2009; now with possibility of acute on chronic DVT   5. Afib/flutter  6. ARSALAN  7. Hx CAD, CABG, PCI with JEREMY  8. AICD  9. Recent open transverse colectomy for colon cancer  02/19      Procedure: ORIF 3/14    Plan:  1. Tertiary exam completed, negative for other trauma.    2. Femur fracture: Orthopedic consult, surgical fixation performed 3/14  1. WBAT to LLE; PT/OT    2. One dose Lovenox in setting of possibility of acute on chronic DVT; warfarin resumed 3/15; held 3/17 in setting of low hemoglobin and need for additional blood transfusion.  INR nearly therapeutic today; continue warfarin today.    3. Follow up in clinic with Dr. Avalos in two weeks   4. Pain control   5. US doppler venous of LLE in setting of increased swelling greater than anticipated.  Patient has hx of chronic DVT in mid femoral vein; US confirms non-occlusive DVT in same area as previously diagnosed.  Acute on chronic propagation cannot be ruled out.      3. Acute traumatic pain: Scheduled Tylenol and lidocaine patches. PRN Oxycodone, robaxin and hydromorphone. Has not been requiring narcotics     4. Coagulopathy: INR 1.8 today.  Warfarin resumed 3/15.  Hgb 7.7 >6.5>8.1>8.0 this AM.  Warfarin dose held last evening due to patient requiring blood transfusion.  Resume dosing today.  Hgb stable this AM     5. Anemia: Per chart review he has been in the 7-8 range since his last surgery in February. 7.8 on admission. Anticipate blood loss secondary  to trauma and coagulation status. Received 1 unit PRBC on admission per Ortho request. Received one additional unit PRBC 3/16 for Hgb 6.2 (down from 7.1 previous 2 days), and 1 additional unit 3/17 for hgb 6.5.  Hgb responded appropriately to transfusion yesterday and remains stable this AM.  Hemodynamically stable. Left thigh and lower extremity with increasing edema but soft. (see above)  No concern for compartment syndrome. +2 pulses palpable distal pulses. +CMS.    Continue to trend hgb daily and PRN     Repeat hemoglobin at 1500     6. Cardiac issues:  Denies any preceding symptoms prior to the fall, denies chest pain, palpitations or shortness of breath. Pre-op EKG obtained and reviewed  1. Resumed aspirin, betablocker and Statin.  Resume Cardura at daily dosing; titrate up to BID dosing (home dose) if BP allows     7. Resp: no acute issue. Contnue routine pulmonary toilet    8. Home meds: Zoloft and home vitamins.     9. FEN/GI:   1. ARSALAN; now resolved.  Encourage PO intake.   2. Vit B12 and Vit D levels ordered. D level WNLs.   B12 elevated.  Takes supplementation daily   3. Electrolyte monitoring and replacement per protocol     10. PT/OT post op    11. SW discharge planning; anticipate discharge Tuesday as long as hemoglobin remains stable     12. Palliative care consult: routine given multiple co-morbidities on recent major trauma     General Cares:               PPI/H2 blocker:  n/a              DVT prophylaxis: Mechanical until post op; lovenox 3/15; warfarin 3/15              Bowel Regimen/Date of last stool: 03/15; on bowel regimen               Pulmonary toilet: IS              ETOH screen completed 03/12              Lines / drains: PIV     Code status:  Full code              Discharge goals:     Adequate pain management: YES     VSS x24 hours: yes    Hemoglobin stable x 48 hours: no     Ambulating safely and/or therapy evals complete: ongoing     Drains/lines removed or plan in place to manage:  yes    Teaching done: ongong    Other:  Expected D/C date:  Anticipate TCU at discharge; likely Tuesday      Interval History   No acute events overnight. Patient feeling well this AM and denies pain.  Increased swelling in LLE prompted venous duplex US. US shows non-occlusive DVT in mid left femoral vein in similar location as previously diagnosed DVT.  Acute on chronic propagation possible.  Pulses palpable.  Skin soft and compressible.  Hgb stable this AM.  Denies SOB or chest pain.      ROS x 8 negative with exception of those things listed in interval hx    Physical Exam   Temp: 96.2  F (35.7  C) Temp src: Oral BP: 129/61 Pulse: 62 Heart Rate: 63 Resp: 16 SpO2: 99 % O2 Device: None (Room air)    There were no vitals filed for this visit.  Vital Signs with Ranges  Temp:  [96.1  F (35.6  C)-98  F (36.7  C)] 96.2  F (35.7  C)  Pulse:  [62-67] 62  Heart Rate:  [61-80] 63  Resp:  [16-18] 16  BP: ()/(51-67) 129/61  SpO2:  [97 %-100 %] 99 %  I/O last 3 completed shifts:  In: 850 [P.O.:840; I.V.:10]  Out: 875 [Urine:875]    [place] Coma Scale - Total 15/15    Constitutional: Awake, alert, cooperative, no apparent distress.  Eyes: Lids and lashes normal, pupils equal, round and reactive to light  ENT: Normocephalic, atraumatic  Respiratory: No increased work of breathing, good air exchange, clear to auscultation bilaterally, no crackles or wheezing.  Cardiovascular:  Regular rate and rhythm; normal S1 and S2, no S3 or S4, and no murmur.   GI: Normal bowel sounds, abdomen soft, non-distended, non-tender, no guarding  Genitourinary: No urine to assess   Skin:  Chronic mykel appearance of the left lower extremity with 3+ pitting edema in LLE; and increased edema throughout entire left leg, though more pronounced in foot and ankle. Caryl-incisional skin soft and compressible. Incision dressing CDI   Musculoskeletal: left hip tender to palpation; no calf tenderness, pulses intact. Skin edematous but soft and  compressible     Neurologic: Awake, alert, oriented. Cranial nerves II-XII are grossly intact.  Strength and sensory is intact. No focal deficits.  Neuropsychiatric: Calm, normal eye contact, alert, affect appropriate to situation, oriented, thought process normal.    MARIA LUISA Chavez CNP  To contact the trauma service use job code pager [phone],   Numeric texts or alpha text through [facility]

## 2019-03-18 NOTE — PLAN OF CARE
/74 (BP Location: Left arm)   Pulse 115   Temp 96.4  F (35.8  C) (Oral)   Resp 15   SpO2 100%      Neuro: WDL  Cardiac: WDL, left leg and foot swollen  Respiratory: WDL  GI/: Voiding using the urinal and had large soft BM.   Diet/Appetite: Good  on regular diet  Skin: Surgical site covered otherwise intact   LDA: PIV saline locked   Activity: Up in chair and worked with PT (see notes)   Pain:  Robaxin and Tylenol given separately effective, pt agreed to use Robaxin.   Plan: Continue plan of care.

## 2019-03-18 NOTE — PLAN OF CARE
Discharge Planner PT   Patient plan for discharge: ARU  Current status: Improved mobility today. Ambulates x 125 with rolling walker and Mari, though gait still very slow and antalgic. Difficulty planting R heel on ground during ambulation. Worked on improving sit-stand today, still CGA-Mari and flexed posture but improved from yesterday. Took muscle relaxant prior to session, which seems to have helped with pain and stiffness.  Barriers to return to prior living situation: Medical, significantly impaired functional mobility  Recommendations for discharge: ARU  Rationale for recommendations: Motivated, able to tolerate 3 hours of therapy, good support system, large difference between current/PLOF, multiple therapy needs.       Entered by: Chino Petersen 03/18/2019 12:46 PM

## 2019-03-18 NOTE — PLAN OF CARE
Patient denies SOB, clear LS. VSS on RA. +BS, +flatus, no BM. Voids adequately. LLE wound dressing cdi, +CMS, assist of 2 to transfer, with walker and gait belt. WBAT. Denies pain. Kept ext elevated, +3 edema. Continue poc.

## 2019-03-18 NOTE — DISCHARGE SUMMARY
[facility], [place]    Discharge Summary  Trauma Surgery Service    Date of Admission:  3/12/2019  Date of Discharge:  3/19/2019  Discharging Provider: RE De La Paz   Date of Service (when I saw the patient): 3/19/2019    Primary Provider: Roberto Sarmiento  Primary Care clinic: [address] [place] [id number]  Phone: [phone]  Fax number: [phone]     Discharge Diagnoses   Trauma mechanism: Mechanical fall on icy surface  Time/date of injury: 03/12/19 about 1pm-[place]  Known Injuries:  1. Intertrochanteric fracture of the left proximal femur     Other diagnoses:   1. Acute traumatic pain  2. Acute blood loss on anemia of chronic illness  3. Coagulopathy-On chronic Warfarin therapy  4. Hx of DVT; chronic. Initially diagnosed 2009; now with possibility of acute on chronic DVT   5. Afib/flutter  6. ARSALAN  7. Hx CAD, CABG, PCI with JEREMY  8. AICD  9. Recent open transverse colectomy for colon cancer  02/19      Procedure: ORIF 3/14    Hospital Course   HPI:  Noe Florence is a 83 year old male who presented 3/12 with for evaluation of left hip pain after a fall. Mr Florence reports that he was taking out the garbage to the curb . He states he slipped on a patch of ice on the driveway and landed on his left side. He developed immediate left hip pain and was unable to ambulate. His wife states she saw him sitting on the ground and called for help.     He denies hitting his head or injury to any other part of his body. He states he has been recovering from his recent colectomy surgery with no recent URI, diarrhea, chest pain, shortness of breath or other changes in his health. He denies any palpitations, lightheadedness or dizziness. He states he simply had a mechanical fall after slipping on ice.  Imaging confirmed a left intertrochanteric femur fracture of the proximal femur.  He was admitted to the hospital under the Trauma Service and orthopedics was consulted for surgical  repair.       Traumatic Injury  The patient sustained the above injury as a result of a fall from standing.  He was seen by the Trauma Resource Nurse and injury prevention education was performed.  The mechanism of injury and factors contributing to the accident were discussed with the patient.  Strategies on how to prevent future accidents were reviewed.  The patient underwent tertiary examination to evaluate for additional injuries.  The systematic review did not find any other injuries.    Orthopedic Injury:  Noe Florence was evaluated by Orthopedics and underwent ORIF on 3/14 by Dr. Avalos. Please see operative note by Dr Avalos for details regarding the procedure. He will need Lovenox for DVT prophylaxis for 6 weeks, however he is chronically anticoagulated with Coumadin and will remain so.  He was on Lovenox while inpatient while INR was subtherapeutic.  He is recommended to Weight bearing as tolerated and is requested to follow up in the Orthopedic Clinic in 2 weeks for suture removal and in 6 weeks with repeat films. He  was evaluated by physical and occupational therapies during his hospitalization.  Please see summary of most current therapy notes below.     Acute pain  Pain was controlled with a multi-modality approach.  The current regimen for Noe Florence includes the following, scheduled acetaminophen, topical analgesic and PRN muscle relaxants.  Patient did not tolerate opioid medications and he did not require opioids for pain control during his hospitalization.  Adequate pain control was achieved with this regimen.  We anticipate that they will taper off this regimen over the next several weeks.    Acute blood loss anemia  Noe Florence was admitted with a hemoglobin of 8.1 . It is common for blood loss associated with this injury.  Our protocol is to keep hemoglobin >7.0 g/dL.  Over the course of his hospitalization he has received 3 units of PRBC and the most recent hemoglobin is 7.9.  There  are no signs of symptoms of ongoing blood loss at the time of discharge.       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  During his hospitalization he had swelling in the lower extremity that was slightly beyond the degree of swelling to be expected for this procedure. For this reason, a venous duplex US was obtained to assess for DVT.  Non-occlusive clot was seen in the distribution of the previously known chronic DVT.  No additional clot was seen.  Due to increased swelling, acute propagation of the known clot cannot be excluded, but per radiology report the clot appeared chronic in nature.    The patient was initiated on Lovenox and Warfarin was resumed on POD #1.  Lovenox was held on POD #2 due to anemia requiring transfusion.  Warfarin was continued at this time. On POD #3 both warfarin and Lovenox were held due ongoing anemia.  INR remained subtherapeutic during this time.  On POD #4, hemoglobin was stable and thus, in light of patient's chronic vs acute on chronic DVT and subtherapeutic INR, patient was initiated on treatment dose of Lovenox.  Lovenox is to be continued until patient has a therapeutic INR (goal range 2-3).  When patient's INR is therapeutic, please discontinue Lovenox.      Therapy Recommendations:   Current status of physical therapies on discharge:   Patient plan for discharge: ARU  Current status: Improved mobility today. Ambulates x 125 with rolling walker and Mari, though gait still very slow and antalgic. Difficulty planting R heel on ground during ambulation. Worked on improving sit-stand today, still CGA-Mari and flexed posture but improved from yesterday. Took muscle relaxant prior to session, which seems to have helped with pain and stiffness.  Barriers to return to prior living situation: Medical, significantly impaired functional mobility  Recommendations for discharge:  ARU  Rationale for recommendations: Motivated, able to tolerate 3 hours of therapy, good support system, large difference between current/PLOF, multiple therapy needs.    Current status of occupational therapies on discharge:  Current status: bed mobility Mari to assist with LLE to EOB. STS to FWW Mari. CGA during standing, tolerating 10 minutes of standing, no LOB, working on increasing weight bearing and weight shifting on LLE. Stand pivot transfer with shuffling steps CGA with use of FWW, limited by pain and fear of falling.   Barriers to return to prior living situation: post surgical condition, pain  Recommendations for discharge: ARU  Rationale for recommendations: pt motivated to participate with good insight into increasing functional mobility and ADLs. Pt is limited by above barriers and continues to be risk for falls with ADLs/IADLs and below baseline. Pt to benefit from continued skilled OT services to maximize safety/I with ADLs/IADLs.     Significant Results and Procedures   DATE: 3/14  Procedure(s):  Open Reduction Internal Fixation Left Femur Intramedullar Nailing  Surgeon(s): Surgeon(s) and Role:     * Srikanth Avalos MD - Primary     * Cristian Almaraz MD - Resident - Assisting     * Tremayne Qiu MD - Resident - Observing  Non-operative procedures None performed    Pending Results   These results will be followed up by   Unresulted Labs Ordered in the Past 30 Days of this Admission     No orders found from 1/11/2019 to 3/13/2019.        Code Status   Full Code  Physical Exam   Temp: 97  F (36.1  C) Temp src: Oral BP: 128/58 Pulse: 60   Resp: 16 SpO2: 97 % O2 Device: None (Room air)    There were no vitals filed for this visit.  Vital Signs with Ranges  Temp:  [96  F (35.6  C)-97.7  F (36.5  C)] 97  F (36.1  C)  Pulse:  [] 60  Resp:  [15-16] 16  BP: (106-129)/(55-74) 128/58  SpO2:  [96 %-100 %] 97 %  I/O last 3 completed shifts:  In: 1300 [P.O.:1300]  Out: 650  [Urine:650]    Constitutional: Awake, alert, cooperative, no apparent distress.  Eyes: Lids and lashes normal, pupils equal, round and reactive to light  ENT: Normocephalic, atraumatic  Respiratory: No increased work of breathing, good air exchange, clear to auscultation bilaterally, no crackles or wheezing.  Cardiovascular:  Regular rate and rhythm; normal S1 and S2, no S3 or S4, and no murmur.   GI: Normal bowel sounds, abdomen soft, non-distended, non-tender, no guarding  Genitourinary: No urine to assess   Skin:  Left lower extremity with 3+ pitting edema and increased edema throughout entire left leg, though more pronounced in foot and ankle. Caryl-incisional skin soft and compressible. Incision dressing CDI   Musculoskeletal: left hip tender to palpation; no calf tenderness, pulses intact. Skin edematous but soft and compressible     Neurologic: Awake, alert, oriented. Cranial nerves II-XII are grossly intact.  Strength and sensory is intact. No focal deficits.  Neuropsychiatric: Calm, normal eye contact, alert, affect appropriate to situation, oriented, thought process normal.      Discharge Disposition   Discharged to rehabilitation facility  Condition at discharge: Stable  Discharge VS: Blood pressure 128/58, pulse 60, temperature 97  F (36.1  C), temperature source Oral, resp. rate 16, SpO2 97 %.    Consultations This Hospital Stay   INTERNAL MEDICINE ADULT IP CONSULT FOR [place]  PALLIATIVE CARE ADULT IP CONSULT  OCCUPATIONAL THERAPY ADULT IP CONSULT  PHYSICAL THERAPY ADULT IP CONSULT  SOCIAL WORK IP CONSULT  MEDICATION HISTORY IP PHARMACY CONSULT  OCCUPATIONAL THERAPY ADULT IP CONSULT  PHYSICAL THERAPY ADULT IP CONSULT  PHARMACY TO DOSE WARFARIN  VASCULAR ACCESS CARE ADULT IP CONSULT  PHYSICAL THERAPY ADULT IP CONSULT  OCCUPATIONAL THERAPY ADULT IP CONSULT    Discharge Orders      General info for SNF    Length of Stay Estimate: Short Term Care: Estimated # of Days <30  Condition at Discharge:  Stable  Level of care:skilled   Rehabilitation Potential: Good  Admission H&P remains valid and up-to-date: Yes  Recent Chemotherapy: N/A  Use Nursing Home Standing Orders: Yes     Mantoux instructions    Give two-step Mantoux (PPD) Per Facility Policy Yes     Daily weights    Call Provider for weight gain of more than 2 pounds per day or 5 pounds per week.     Wound care (specify)    Site:   Left hip and leg   Instructions:  Ok to remove dressing on 3/20 and leave wound open to air.  Ok to let water run over incision.  Do not soak or scrub incision.     Activity - Up with assistive device     Activity - Up with nursing assistance     Follow Up and recommended labs and tests    Follow up with your primary care provider for continued medical care and hospital follow up in 5-10 days.  You may keep your other previously scheduled appointments.    Please check CBC and INR daily until INR therapeutic for >48 hours.  Once INR therapeutic, return to standard protocol for INR monitoring       Trauma Clinic   [facility]  Floor 4  [address] [place] [address]   Appointments: [phone]   (no formal follow up is requested.  You may contact the trauma clinic if you have any trauma related questions or concerns)    Orthopaedic Clinic  [facility]  Floor 4   [address] [place] [address]   Appointments: [phone]    1.) You are requested to follow up in the Orthopedic clinic in 2 weeks with Dr. Avalos for suture removal and again in 6 weeks with AP and lateral x-rays of the left femur will be obtained, and based on those findings, further recommendations will be given to the patient.     Reason for your hospital stay    Trauma mechanism: Mechanical fall on icy surface  Time/date of injury: 03/12/19 about 1pm-[place]  Known Injuries:  1. Intertrochanteric fracture of the left proximal femur     Other diagnoses:   1. Acute traumatic pain  2. Acute blood loss on  anemia of chronic illness  3. Coagulopathy-On chronic Warfarin therapy  4. Hx of DVT; chronic. Initially diagnosed 2009; now with possibility of acute on chronic DVT   5. Afib/flutter  6. ARSALAN  7. Hx CAD, CABG, PCI with JEREMY  8. AICD  9. Recent open transverse colectomy for colon cancer  02/19      Procedure: ORIF 3/14     Full Code     Physical Therapy Adult Consult    Evaluate and treat as clinically indicated.    Reason:  Fall with left intertrochanteric fracture s/p surgical repair.     Occupational Therapy Adult Consult    Evaluate and treat as clinically indicated.    Reason:  Fall with intertrochanteric fracture s/p surgical repair     Fall precautions     Pneumatic Compression Device     Bilateral calf. Remove 30 mins BID.     Advance Diet as Tolerated    Follow this diet upon discharge: Orders Placed This Encounter      Regular Diet Adult     Discharge Medications   Current Discharge Medication List      START taking these medications    Details   acetaminophen (TYLENOL) 325 MG tablet Take 3 tablets (975 mg) by mouth every 8 hours    Associated Diagnoses: Closed displaced subtrochanteric fracture of left femur, initial encounter (H)      bisacodyl (DULCOLAX) 10 MG suppository Place 1 suppository (10 mg) rectally daily as needed for constipation (IF Miralax ineffective)    Associated Diagnoses: Closed displaced subtrochanteric fracture of left femur, initial encounter (H)      calcium carbonate 500 mg, elemental, (OSCAL;OYSTER SHELL CALCIUM) 500 MG tablet Take 1 tablet (500 mg) by mouth 2 times daily (with meals)    Associated Diagnoses: Closed displaced subtrochanteric fracture of left femur, initial encounter (H)      enoxaparin (LOVENOX) 60 MG/0.6ML syringe Inject 0.65 mLs (65 mg) Subcutaneous every 12 hours    Comments: Discontinue once INR is > 2.0.  Goal INR range 2-3  Associated Diagnoses: Closed displaced subtrochanteric fracture of left femur, initial encounter (H)      Lidocaine (LIDOCARE) 4 % Patch  Place 1-2 patches onto the skin every 24 hours Apply to painful areas on left hip    Associated Diagnoses: Closed displaced subtrochanteric fracture of left femur, initial encounter (H)      methocarbamol (ROBAXIN) 750 MG tablet Take 1 tablet (750 mg) by mouth every 6 hours as needed for muscle spasms    Associated Diagnoses: Closed displaced subtrochanteric fracture of left femur, initial encounter (H)      polyethylene glycol (MIRALAX/GLYCOLAX) packet Take 17 g by mouth daily    Associated Diagnoses: Closed displaced subtrochanteric fracture of left femur, initial encounter (H)      senna-docusate (SENOKOT-S/PERICOLACE) 8.6-50 MG tablet Take 1-2 tablets by mouth 2 times daily    Associated Diagnoses: Closed displaced subtrochanteric fracture of left femur, initial encounter (H)      Warfarin Therapy Reminder 1 each continuous prn    Associated Diagnoses: Chronic atrial fibrillation (H)         CONTINUE these medications which have CHANGED    Details   warfarin (COUMADIN) 5 MG tablet Take 1 tablet (5 mg) by mouth once for 1 dose  Qty: 1 tablet, Refills: 0    Associated Diagnoses: Chronic atrial fibrillation (H)         CONTINUE these medications which have NOT CHANGED    Details   aspirin 81 MG tablet Take 1 tablet by mouth daily.      atorvastatin (LIPITOR) 40 MG tablet Take 1 tablet (40 mg) by mouth daily as directed.  Qty: 90 tablet, Refills: 0    Associated Diagnoses: Hyperlipidemia, unspecified hyperlipidemia type      cholecalciferol 1000 units TABS Take 1,000 Units by mouth 2 times daily      cyanocobalamin (VITAMIN B-12) 1000 MCG tablet Take 1,000 mcg by mouth 2 times daily      doxazosin (CARDURA) 1 MG tablet Take 1 mg by mouth 2 times daily      emollient (VANICREAM) external cream Apply topically as needed for other (FEET)      metoprolol tartrate (LOPRESSOR) 25 MG tablet Take 1 tablet (25 mg) by mouth 2 times daily  Qty: 180 tablet, Refills: 3    Associated Diagnoses: Coronary artery disease involving  native heart without angina pectoris, unspecified vessel or lesion type      mirtazapine (REMERON) 15 MG tablet Take 15 mg by mouth At Bedtime.      Multiple Vitamins-Minerals (CENTRUM SILVER) per tablet Take 1 tablet by mouth daily. AM       sertraline (ZOLOFT) 100 MG tablet Take 100 mg by mouth every evening.      Blood Pressure Monitor KIT 1 Units daily  Qty: 1 kit, Refills: 0    Associated Diagnoses: Hypertension      order for DME 20-30 mm Hg knee length compression stockings.  Measure and fit.  Style and color per patient preference.  Doff n Aracelis per patient need.  Qty: 1 Units, Refills: 0    Associated Diagnoses: PVD (peripheral vascular disease) (H); Ulcer of left lower extremity, limited to breakdown of skin (H)           Allergies   Allergies   Allergen Reactions     Latex Rash     Rash     Data   Most Recent 3 CBC's:  Recent Labs   Lab Test 03/18/19  1554 03/18/19  0629 03/17/19  1454 03/17/19  0716  03/15/19  0647   WBC  --  6.9  --  6.8  --  12.0*   HGB 7.9* 8.0* 8.1* 6.5*   < > 7.1*   MCV  --  93  --  92  --  94   PLT  --  217  --  196  --  205    < > = values in this interval not displayed.      Most Recent 3 BMP's:  Recent Labs   Lab Test 03/18/19  0629 03/17/19  0716 03/15/19  0647    142 142   POTASSIUM 4.6 4.1 4.3   CHLORIDE 112* 112* 112*   CO2 23 25 23   BUN 43* 39* 26   CR 1.24 1.29* 1.03   ANIONGAP 8 5 8   KEITH 8.3* 8.1* 8.3*   * 95 102*     Most Recent 2 LFT's:  Recent Labs   Lab Test 12/19/18  1631 11/13/18  1818   AST 35 16   ALT 25 20   ALKPHOS 84 99   BILITOTAL 0.3 0.2     Most Recent INR's and Anticoagulation Dosing History:  Anticoagulation Dose History     Recent Dosing and Labs Latest Ref Rng & Units 3/13/2019 3/13/2019 3/14/2019 3/15/2019 3/16/2019 3/17/2019 3/18/2019    Warfarin 5 mg - - - - 5 mg 5 mg - -    INR 0.86 - 1.14 1.67(H) 1.59(H) 1.60(H) 1.48(H) 1.59(H) 1.92(H) 1.83(H)    INR Point of Care 0.86 - 1.14 - - - - - - -        Most Recent 3 Troponin's:  Recent  Labs   Lab Test 04/20/12  0701 04/19/12  1407 04/19/12  0938   TROPI 0.096* <0.012  --    TROPONIN  --   --  0.00     Most Recent 6 Bacteria Isolates From Any Culture (See EPIC Reports for Culture Details):  Recent Labs   Lab Test 11/04/17  1229 11/04/17  1226 04/15/12  1958   CULT Light growth  Staphylococcus aureus  This isolate is presumed to be clindamycin resistant based on detection of inducible   clindamycin resistance. Erythromycin and clindamycin are resistant, therefore, they are   not recommended for use.  *  Plus  Light growth  Normal skin chetan   No growth No Salmonella, Shigella, Campylobacter, E. coli O157, Aeromonas, or Plesiomonas  isolated.  Test canceled - Lab  error Canceled, Test credited     Most Recent TSH, T4 and A1c Labs:  Recent Labs   Lab Test 03/27/18  1039  02/27/12  1318   TSH 0.67   < >  --    A1C  --   --  5.7    < > = values in this interval not displayed.     Results for orders placed or performed during the hospital encounter of 03/12/19   XR Pelvis and Hip Left 2 Views    Narrative    Exam: XR PELVIS AND HIP LEFT 2 VIEWS, 3/12/2019 3:31 PM    Indication: pain after fall    Comparison: CT chest/abdomen/pelvis 10/1/2018    Findings:   AP views of the pelvis, AP and crosstable lateral view of the left  hip.    Left intertrochanteric fracture with superior angulation of the  proximal femoral component and superior migration of the distal  component. Fracture line extends into the midportion of the greater  trochanter. Prominent bone island in the left femoral head,  characterized as benign on the most recent CT. The femoral diaphyseal  cortex is well delineated, likely due to projectional rotation.    Marked arterial vascular calcifications. Pelvic phleboliths. Surgical  sutures in the descending colon.      Impression    Impression:   Intertrochanteric fracture of the left proximal femur. Superior  angulation of the proximal, and superior migration of the distal  femoral  components.    I have personally reviewed the examination and initial interpretation  and I agree with the findings.    ASHELY TIRADO MD   XR Chest Port 1 View    Narrative    XR CHEST PORT 1 VW 3/12/2019 5:25 PM    History: pre-op eval    Comparison: CT 10/1/2018 comparison radiograph 9/11/2016    Findings: Single AP view of the chest at 30 degrees upright. There are  postoperative changes of coronary artery bypass. Mild cardiomegaly.  Left-sided implanted cardiac defibrillator/pacemaker device with leads  in stable position, projecting over the right atrium and right  ventricle. Median sternotomy wires are intact. Abandoned epicardial  pacemaker leads. Bibasilar scarring/atelectasis. No new focal  pulmonary opacities. Artificial mitral valve. Pulmonary vasculature is  within normal limits no significant pleural effusion or pneumothorax.  Degenerative changes of the right shoulder joint. The visualized bony  structures are otherwise within normal limits.      Impression    Impression:   1. No focal airspace opacities.  2. Cardiomegaly.    I have personally reviewed the examination and initial interpretation  and I agree with the findings.    RA CLARKE MD   XR Femur Left 2 Views    Narrative    Exam: XR FEMUR LT 2 VW, 3/12/2019 7:10 PM    Indication: pre op planning for left femoral nail    Comparison: X-ray of the pelvis 3/12/2019    Findings:   AP and crosstable lateral views of the left hip and proximal knee..    Redemonstration of left intertrochanteric fracture with superior  angulation of the proximal femoral component and superior migration of  the distal component. Fracture line extends into the midportion of the  greater trochanter.     Marked arterial vascular calcifications. Pelvic phleboliths. Surgical  sutures in the descending colon. Severe joint space narrowing,  subchondral sclerosis and osteophytosis of the knee joint.      Impression    Impression:   Redemonstration of  shortened/proximally migrated and varus angulation  of the intertrochanteric fracture of the left proximal femur.     I have personally reviewed the examination and initial interpretation  and I agree with the findings.    RA CLARKE MD   XR Surgery STACEY L/T 5 Min Fluoro w Stills    Narrative    This exam was marked as non-reportable because it will not be read by a   radiologist or a [place] non-radiologist provider.             XR Femur Port Left 2 Views    Narrative    XR FEMUR PORT LEFT 2 VW 3/14/2019 8:36 PM    History: s/p IMN    Comparison: 3/12/2019    Findings: AP and lateral views of the left femur and hip. Interval  changes of open reduction and internal fixation of the left femur with  an intramedullary nail. Alignment of the hip is maintained. Improved  alignment of the intertrochanteric fracture. Calcifications of the  arteries in the upper legs.      Impression    Impression: Improved alignment of the intertrochanteric fracture  status post open reduction and internal fixation.    I have personally reviewed the examination and initial interpretation  and I agree with the findings.    ELIZABETH GARAY MD   US Lower Extremity Venous Duplex Left Port     Value    Radiologist flags Left lower extremity DVT (Urgent)    Narrative    EXAMINATION: DOPPLER VENOUS ULTRASOUND OF THE LEFT LOWER EXTREMITY,  3/18/2019 9:32 AM     COMPARISON: 9/10/2009    HISTORY: Left leg swelling.  Recent left hip fracture.    TECHNIQUE:  Gray-scale evaluation with compression, spectral flow, and  color Doppler assessment of the deep venous system of the left leg  from groin to knee, and then at the ankle.    FINDINGS:  Left mid thigh femoral vein is partially compressible, with  peripherally oriented echogenic thrombus suggesting underlying  chronicity.  Patient previously had femoral vein thrombus in the mid  thigh femoral vein in 2009.     In the left lower extremity, the common femoral, popliteal and  posterior tibial  veins demonstrate normal compressibility and blood  flow. Subcutaneous edema noted at the level of the left ankle.    The evaluated right common femoral vein for comparison is fully  compressible without evidence of clot.      Impression    IMPRESSION:  Left mid femoral vein nonocclusive thrombus.  Acute on  chronic thrombus is a possibility given increased left leg swelling.       [Urgent Result: Left lower extremity DVT]    Finding was identified on 3/18/2019 9:29 AM.     Nurse practitioner KYLE Browne was contacted by Dr. Boggs at 3/18/2019  10:01 AM and verbalized understanding of the urgent finding.     I have personally reviewed the examination and initial interpretation  and I agree with the findings.    KARLA BOGGS MD     *Note: Due to a large number of results and/or encounters for the requested time period, some results have not been displayed. A complete set of results can be found in Results Review.       Time Spent on this Encounter   I, Jeanine Browne, personally saw the patient today and spent greater than 30 minutes discharging this patient.    We appreciate the opportunity to care for your patient while in the hospital.  Should you have any questions about their injuries or this discharge summary our contact information is below.    Trauma Services  [facility]   Department of Critical Care and Acute Care Surgery  [address]  [place] [address]  Office: [phone]  Clinic Nurse: [phone]

## 2019-03-18 NOTE — PLAN OF CARE
VS: /60 (BP Location: Left arm)   Pulse 67   Temp 97.7  F (36.5  C) (Oral)   Resp 16   SpO2 100%   Neuro: A&Ox4, BLE CMS intact   Cardiac: WDL, pacemaker with HR in 60s, BLE pulses 1+ bilat  Resp: lung sounds clear, no SOB reported, sating well on RA, IS encouraged  GI: passing gas, bowel sounds active and audible, no BM this shift  : voiding spontaneously, marginal amount of yellow urine   Skin: L hip/knee dressings changed, incisions stapled with scant serosanguinous drainage; scattered bruising; mepilex on coccyx intact, repositioned q2h as tolerated; LLE edema 3+, leg elevated in bed  Pain/Nausea: denies pain, muscle spasms controlled with robaxin x1; denies nausea  LDA: L PIV SL  Diet: regular, good appetite  Mobility: 2 assist with FWW and gait belt  Labs: reviewed, hgb 8.1 after 1 unit of blood administered, INR 1.92 (no warfarin dosed today)  Plan: continue plan of care

## 2019-03-18 NOTE — PROGRESS NOTES
Social Work Services Progress Note    Hospital Day: 7  Date of Initial Social Work Evaluation:  3/12/19- please see for details  Collaborated with: Chart review, team rounds, Trauma team,  Rehab admissions (Bryanna), pt, pt's significant other Rica    Data:  Pt is a 83 year old male admitted from home after falling and sustaining a hip fracture.     TCU had been recommended for pt but now recommendations have switched to ARU.     Per Trauma team pt could be ready for discharge Tuesday pending labs.     Intervention:  ACACIA spoke with Bryanna with  ARU admissions and she reported that pt is ARU appropriate.    ACACIA met with pt in pt's room to check in and provide update. Pt reported he is doing ok today and recalled SW and previous visit discussing TCU. ACACIA explained ARU recommendation and the difference between ARU and TCU. Pt reported wanting to speak with Rica before making a decision but was agreeable to meeting with liaison Bryanna to get more information on ARU. SW offered a list of ARU options but pt [facility].     ACACIA informed Bryanna that pt would be open to a visit with her. Bryanna updated ACACIA after her visit with pt that he does want to pursue ARU. Bryanna also noted that pt has insurance approval for ARU.     ACACIA later met with pt and Rica in pt's room to check in and discuss discharge planning. Pt and Rica confirmed being on board with discharge to  ARU and understand this could be tomorrow if pt is medically ready. Pt reported he could do w/c transport. ACACIA explained that whether or not pt can discharge tomorrow will be dependent on if pt is medically ready and if [facility]. Pt and Rica denied having further SW needs or questions at this time.     Assessment:  See bedside RN, PT/OT, medical team notes    Plan:    Anticipated Disposition:  Facility:  [facility]    Barriers to d/c plan:  Medical stability    Follow Up:  ACACIA LLAMAS  will continue to follow    YUE Villeda, [facility] Care[facility]     [phone] (pager) [phone]  3/18/2019

## 2019-03-19 ENCOUNTER — TELEPHONE (OUTPATIENT)
Dept: INTERNAL MEDICINE | Facility: CLINIC | Age: 84
End: 2019-03-19

## 2019-03-19 ENCOUNTER — APPOINTMENT (OUTPATIENT)
Dept: OCCUPATIONAL THERAPY | Facility: CLINIC | Age: 84
DRG: 481 | End: 2019-03-19
Payer: COMMERCIAL

## 2019-03-19 ENCOUNTER — HOSPITAL ENCOUNTER (INPATIENT)
Facility: CLINIC | Age: 84
LOS: 12 days | Discharge: HOME HEALTH | DRG: 560 | End: 2019-03-31
Attending: PHYSICAL MEDICINE & REHABILITATION | Admitting: PHYSICAL MEDICINE & REHABILITATION
Payer: COMMERCIAL

## 2019-03-19 VITALS
SYSTOLIC BLOOD PRESSURE: 102 MMHG | DIASTOLIC BLOOD PRESSURE: 48 MMHG | TEMPERATURE: 96.7 F | HEART RATE: 60 BPM | OXYGEN SATURATION: 98 % | RESPIRATION RATE: 16 BRPM

## 2019-03-19 DIAGNOSIS — K59.01 SLOW TRANSIT CONSTIPATION: ICD-10-CM

## 2019-03-19 DIAGNOSIS — S72.002A CLOSED FRACTURE OF LEFT HIP, INITIAL ENCOUNTER (H): Primary | ICD-10-CM

## 2019-03-19 DIAGNOSIS — S72.22XA CLOSED DISPLACED SUBTROCHANTERIC FRACTURE OF LEFT FEMUR, INITIAL ENCOUNTER (H): ICD-10-CM

## 2019-03-19 DIAGNOSIS — I48.20 CHRONIC ATRIAL FIBRILLATION (H): ICD-10-CM

## 2019-03-19 PROBLEM — S72.009A HIP FRACTURE (H): Status: ACTIVE | Noted: 2019-03-19

## 2019-03-19 LAB
ANION GAP SERPL CALCULATED.3IONS-SCNC: 7 MMOL/L (ref 3–14)
BUN SERPL-MCNC: 38 MG/DL (ref 7–30)
CALCIUM SERPL-MCNC: 8.4 MG/DL (ref 8.5–10.1)
CHLORIDE SERPL-SCNC: 110 MMOL/L (ref 94–109)
CO2 SERPL-SCNC: 26 MMOL/L (ref 20–32)
CREAT SERPL-MCNC: 1.18 MG/DL (ref 0.66–1.25)
ERYTHROCYTE [DISTWIDTH] IN BLOOD BY AUTOMATED COUNT: 16.2 % (ref 10–15)
GFR SERPL CREATININE-BSD FRML MDRD: 56 ML/MIN/{1.73_M2}
GLUCOSE SERPL-MCNC: 169 MG/DL (ref 70–99)
HCT VFR BLD AUTO: 24.7 % (ref 40–53)
HGB BLD-MCNC: 7.6 G/DL (ref 13.3–17.7)
INR PPP: 1.67 (ref 0.86–1.14)
MCH RBC QN AUTO: 28.9 PG (ref 26.5–33)
MCHC RBC AUTO-ENTMCNC: 30.8 G/DL (ref 31.5–36.5)
MCV RBC AUTO: 94 FL (ref 78–100)
PLATELET # BLD AUTO: 215 10E9/L (ref 150–450)
POTASSIUM SERPL-SCNC: 3.9 MMOL/L (ref 3.4–5.3)
RBC # BLD AUTO: 2.63 10E12/L (ref 4.4–5.9)
SODIUM SERPL-SCNC: 143 MMOL/L (ref 133–144)
WBC # BLD AUTO: 5.7 10E9/L (ref 4–11)

## 2019-03-19 PROCEDURE — 97530 THERAPEUTIC ACTIVITIES: CPT | Mod: GP

## 2019-03-19 PROCEDURE — 25000132 ZZH RX MED GY IP 250 OP 250 PS 637: Performed by: PHYSICAL MEDICINE & REHABILITATION

## 2019-03-19 PROCEDURE — 85027 COMPLETE CBC AUTOMATED: CPT | Performed by: NURSE PRACTITIONER

## 2019-03-19 PROCEDURE — 80048 BASIC METABOLIC PNL TOTAL CA: CPT | Performed by: NURSE PRACTITIONER

## 2019-03-19 PROCEDURE — 36415 COLL VENOUS BLD VENIPUNCTURE: CPT | Performed by: NURSE PRACTITIONER

## 2019-03-19 PROCEDURE — 99239 HOSP IP/OBS DSCHRG MGMT >30: CPT | Performed by: NURSE PRACTITIONER

## 2019-03-19 PROCEDURE — 25000128 H RX IP 250 OP 636: Performed by: PHYSICAL MEDICINE & REHABILITATION

## 2019-03-19 PROCEDURE — 25000132 ZZH RX MED GY IP 250 OP 250 PS 637

## 2019-03-19 PROCEDURE — 85610 PROTHROMBIN TIME: CPT | Performed by: NURSE PRACTITIONER

## 2019-03-19 PROCEDURE — 25000132 ZZH RX MED GY IP 250 OP 250 PS 637: Performed by: NURSE PRACTITIONER

## 2019-03-19 PROCEDURE — 25000128 H RX IP 250 OP 636: Performed by: NURSE PRACTITIONER

## 2019-03-19 PROCEDURE — 97530 THERAPEUTIC ACTIVITIES: CPT | Mod: GO

## 2019-03-19 PROCEDURE — 97535 SELF CARE MNGMENT TRAINING: CPT | Mod: GO

## 2019-03-19 PROCEDURE — 12800006 ZZH R&B REHAB

## 2019-03-19 RX ORDER — DOXAZOSIN 1 MG/1
1 TABLET ORAL 2 TIMES DAILY
Status: DISCONTINUED | OUTPATIENT
Start: 2019-03-19 | End: 2019-03-31 | Stop reason: HOSPADM

## 2019-03-19 RX ORDER — ACETAMINOPHEN 325 MG/1
975 TABLET ORAL EVERY 8 HOURS
Status: DISCONTINUED | OUTPATIENT
Start: 2019-03-19 | End: 2019-03-31 | Stop reason: HOSPADM

## 2019-03-19 RX ORDER — AMOXICILLIN 250 MG
1-2 CAPSULE ORAL 2 TIMES DAILY
Status: DISCONTINUED | OUTPATIENT
Start: 2019-03-19 | End: 2019-03-31 | Stop reason: HOSPADM

## 2019-03-19 RX ORDER — WARFARIN SODIUM 5 MG/1
5 TABLET ORAL
Status: DISCONTINUED | OUTPATIENT
Start: 2019-03-19 | End: 2019-03-19 | Stop reason: HOSPADM

## 2019-03-19 RX ORDER — METHOCARBAMOL 750 MG/1
750 TABLET, FILM COATED ORAL EVERY 6 HOURS PRN
Status: DISCONTINUED | OUTPATIENT
Start: 2019-03-19 | End: 2019-03-29

## 2019-03-19 RX ORDER — ASPIRIN 81 MG/1
81 TABLET, CHEWABLE ORAL DAILY
Status: DISCONTINUED | OUTPATIENT
Start: 2019-03-20 | End: 2019-03-31 | Stop reason: HOSPADM

## 2019-03-19 RX ORDER — LANOLIN ALCOHOL/MO/W.PET/CERES
1000 CREAM (GRAM) TOPICAL 2 TIMES DAILY
Status: DISCONTINUED | OUTPATIENT
Start: 2019-03-19 | End: 2019-03-31 | Stop reason: HOSPADM

## 2019-03-19 RX ORDER — SERTRALINE HYDROCHLORIDE 100 MG/1
100 TABLET, FILM COATED ORAL EVERY EVENING
Status: DISCONTINUED | OUTPATIENT
Start: 2019-03-19 | End: 2019-03-31 | Stop reason: HOSPADM

## 2019-03-19 RX ORDER — NALOXONE HYDROCHLORIDE 0.4 MG/ML
.1-.4 INJECTION, SOLUTION INTRAMUSCULAR; INTRAVENOUS; SUBCUTANEOUS
Status: DISCONTINUED | OUTPATIENT
Start: 2019-03-19 | End: 2019-03-29

## 2019-03-19 RX ORDER — MULTIPLE VITAMINS W/ MINERALS TAB 9MG-400MCG
1 TAB ORAL DAILY
Status: DISCONTINUED | OUTPATIENT
Start: 2019-03-20 | End: 2019-03-31 | Stop reason: HOSPADM

## 2019-03-19 RX ORDER — ATORVASTATIN CALCIUM 40 MG/1
40 TABLET, FILM COATED ORAL DAILY
Status: DISCONTINUED | OUTPATIENT
Start: 2019-03-20 | End: 2019-03-31 | Stop reason: HOSPADM

## 2019-03-19 RX ORDER — CALCIUM CARBONATE 500(1250)
500 TABLET ORAL 2 TIMES DAILY WITH MEALS
Status: DISCONTINUED | OUTPATIENT
Start: 2019-03-19 | End: 2019-03-29

## 2019-03-19 RX ORDER — WARFARIN SODIUM 5 MG/1
5 TABLET ORAL
Status: COMPLETED | OUTPATIENT
Start: 2019-03-19 | End: 2019-03-19

## 2019-03-19 RX ORDER — DOXAZOSIN 1 MG/1
1 TABLET ORAL EVERY 12 HOURS SCHEDULED
Status: DISCONTINUED | OUTPATIENT
Start: 2019-03-19 | End: 2019-03-19 | Stop reason: HOSPADM

## 2019-03-19 RX ORDER — MIRTAZAPINE 7.5 MG/1
15 TABLET, FILM COATED ORAL AT BEDTIME
Status: DISCONTINUED | OUTPATIENT
Start: 2019-03-19 | End: 2019-03-31 | Stop reason: HOSPADM

## 2019-03-19 RX ORDER — WARFARIN SODIUM 5 MG/1
5 TABLET ORAL ONCE
Qty: 1 TABLET | Refills: 0 | Status: ON HOLD | DISCHARGE
Start: 2019-03-19 | End: 2019-03-29

## 2019-03-19 RX ORDER — LIDOCAINE 4 G/G
1-2 PATCH TOPICAL
Status: DISCONTINUED | OUTPATIENT
Start: 2019-03-20 | End: 2019-03-29

## 2019-03-19 RX ORDER — POLYETHYLENE GLYCOL 3350 17 G/17G
17 POWDER, FOR SOLUTION ORAL DAILY
Status: DISCONTINUED | OUTPATIENT
Start: 2019-03-19 | End: 2019-03-29

## 2019-03-19 RX ORDER — METOPROLOL TARTRATE 25 MG/1
25 TABLET, FILM COATED ORAL 2 TIMES DAILY
Status: DISCONTINUED | OUTPATIENT
Start: 2019-03-19 | End: 2019-03-31 | Stop reason: HOSPADM

## 2019-03-19 RX ORDER — BISACODYL 10 MG
10 SUPPOSITORY, RECTAL RECTAL DAILY PRN
Status: DISCONTINUED | OUTPATIENT
Start: 2019-03-19 | End: 2019-03-29

## 2019-03-19 RX ADMIN — VITAMIN D, TAB 1000IU (100/BT) 1000 UNITS: 25 TAB at 21:54

## 2019-03-19 RX ADMIN — SENNOSIDES AND DOCUSATE SODIUM 1 TABLET: 8.6; 5 TABLET ORAL at 21:55

## 2019-03-19 RX ADMIN — LIDOCAINE 1 PATCH: 560 PATCH PERCUTANEOUS; TOPICAL; TRANSDERMAL at 08:32

## 2019-03-19 RX ADMIN — VITAMIN D, TAB 1000IU (100/BT) 1000 UNITS: 25 TAB at 08:32

## 2019-03-19 RX ADMIN — MIRTAZAPINE 15 MG: 7.5 TABLET ORAL at 21:54

## 2019-03-19 RX ADMIN — METOPROLOL TARTRATE 25 MG: 25 TABLET ORAL at 08:32

## 2019-03-19 RX ADMIN — CYANOCOBALAMIN TAB 1000 MCG 1000 MCG: 1000 TAB at 21:55

## 2019-03-19 RX ADMIN — ACETAMINOPHEN 975 MG: 325 TABLET, FILM COATED ORAL at 18:30

## 2019-03-19 RX ADMIN — SERTRALINE HYDROCHLORIDE 100 MG: 100 TABLET ORAL at 21:57

## 2019-03-19 RX ADMIN — CALCIUM 500 MG: 500 TABLET ORAL at 08:32

## 2019-03-19 RX ADMIN — POLYETHYLENE GLYCOL 3350 17 G: 17 POWDER, FOR SOLUTION ORAL at 18:30

## 2019-03-19 RX ADMIN — WARFARIN SODIUM 5 MG: 5 TABLET ORAL at 18:29

## 2019-03-19 RX ADMIN — SENNOSIDES AND DOCUSATE SODIUM 1 TABLET: 8.6; 5 TABLET ORAL at 08:33

## 2019-03-19 RX ADMIN — MULTIPLE VITAMINS W/ MINERALS TAB 1 TABLET: TAB at 08:32

## 2019-03-19 RX ADMIN — DOXAZOSIN 1 MG: 1 TABLET ORAL at 08:32

## 2019-03-19 RX ADMIN — ACETAMINOPHEN 975 MG: 325 TABLET, FILM COATED ORAL at 03:39

## 2019-03-19 RX ADMIN — METOPROLOL TARTRATE 25 MG: 25 TABLET, FILM COATED ORAL at 21:54

## 2019-03-19 RX ADMIN — CYANOCOBALAMIN TAB 1000 MCG 1000 MCG: 1000 TAB at 08:32

## 2019-03-19 RX ADMIN — ACETAMINOPHEN 975 MG: 325 TABLET, FILM COATED ORAL at 11:23

## 2019-03-19 RX ADMIN — CALCIUM 500 MG: 500 TABLET ORAL at 18:29

## 2019-03-19 RX ADMIN — ASPIRIN 81 MG CHEWABLE TABLET 81 MG: 81 TABLET CHEWABLE at 08:32

## 2019-03-19 RX ADMIN — DOXAZOSIN MESYLATE 1 MG: 1 TABLET ORAL at 21:54

## 2019-03-19 RX ADMIN — ATORVASTATIN CALCIUM 40 MG: 40 TABLET, FILM COATED ORAL at 08:32

## 2019-03-19 RX ADMIN — ENOXAPARIN SODIUM 60 MG: 60 INJECTION SUBCUTANEOUS at 21:53

## 2019-03-19 RX ADMIN — METHOCARBAMOL 750 MG: 750 TABLET, FILM COATED ORAL at 08:42

## 2019-03-19 RX ADMIN — ENOXAPARIN SODIUM 60 MG: 60 INJECTION SUBCUTANEOUS at 11:23

## 2019-03-19 ASSESSMENT — ACTIVITIES OF DAILY LIVING (ADL)
RETIRED_EATING: 0-->INDEPENDENT
ADLS_ACUITY_SCORE: 19
WHICH_OF_THE_ABOVE_FUNCTIONAL_RISKS_HAD_A_RECENT_ONSET_OR_CHANGE?: AMBULATION;TRANSFERRING
AMBULATION: 0-->INDEPENDENT
FALL_HISTORY_WITHIN_LAST_SIX_MONTHS: YES
ADLS_ACUITY_SCORE: 19
RETIRED_COMMUNICATION: 0-->UNDERSTANDS/COMMUNICATES WITHOUT DIFFICULTY
ADLS_ACUITY_SCORE: 16
COGNITION: 0 - NO COGNITION ISSUES REPORTED
BATHING: 1-->ASSISTIVE EQUIPMENT
TOILETING: 1-->ASSISTIVE EQUIPMENT
DRESS: 0-->INDEPENDENT
NUMBER_OF_TIMES_PATIENT_HAS_FALLEN_WITHIN_LAST_SIX_MONTHS: 1
TRANSFERRING: 0-->INDEPENDENT
SWALLOWING: 0-->SWALLOWS FOODS/LIQUIDS WITHOUT DIFFICULTY
ADLS_ACUITY_SCORE: 19

## 2019-03-19 ASSESSMENT — MIFFLIN-ST. JEOR: SCORE: 1343.55

## 2019-03-19 NOTE — PROGRESS NOTES
Social Work Services Discharge Note      Patient Name:  Noe Florence     Anticipated Discharge Date:  3/19/19    Discharge Disposition:   ARU:  [facility]   [address] 5th floor  [place], [place]  [phone]    Following MD:  To be determined at facility     Pre-Admission Screening (PAS) online form has been completed.  The Level of Care (LOC) is:  Determined  Confirmation Code is:  Not needed for ARU  Patient/caregiver informed of referral to [facility] for Pre-Admission Screening for skilled nursing facility (SNF) placement and to expect a phone call post discharge from SNF.     Additional Services/Equipment Arranged:  W/c transport arranged via [employer] ([phone]) for today at 2:15pm.      Patient / Family response to discharge plan:  Pt is agreeable and reported he will update his significant other Rica and declined to have SW update anyone     Persons notified of above discharge plan:  Pt, bedside nurse, charge nurse, NST, receiving facility, Trauma team    Staff Discharge Instructions:  Please fax discharge orders and signed hard scripts for any controlled substances.  Please print a packet and send with patient.     CTS Handoff completed:  YES    Medicare Notice of Rights provided to the patient/family:  YES    YUE Villeda, [facility]  7B   [phone] (pager) [phone]  3/19/2019

## 2019-03-19 NOTE — PLAN OF CARE
Discharge Planner OT   Patient plan for discharge: rehab  Current status: Min A for supine <> sit, sit <> supine due to support required to mobilize LLE, Min A for STS x 3 with pt holding second two stands for 60 seconds, VC required when pt standing for pt to keep hips forward and to look straight ahead, min A for use of urinal, SBA for LB bathing, dep for LBD  Barriers to return to prior living situation: LLE limitations, level of A  Recommendations for discharge: ARU  Rationale for recommendations: Pt would benefit from intensive therapy to increase IND with ADL/IADLs       Entered by: Candis Ford 03/19/2019 4:32 PM     Occupational Therapy Discharge Summary    Reason for therapy discharge:    Discharged to acute rehabilitation facility.    Progress towards therapy goal(s). See goals on Care Plan in [facility] electronic health record for goal details.  Goals partially met.  Barriers to achieving goals:   discharge from facility.    Therapy recommendation(s):    Continued therapy is recommended.  Rationale/Recommendations:  to increase ADL IND.

## 2019-03-19 NOTE — H&P
"    [facility]   Acute Rehabilitation Unit  Admission History and Physical    chief complaint   L hip fracture      History of Present illness  Noe Florence is a 83 year old right hand dominant male who was admitted on 3/12 after a fall resulting in L intertrochanteric femoral fracture s/p ORIF on 3/14. 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 He clinically improved and was transferred to ARU today. 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       During his acute hospitalization, patient was seen and evaluated by PT, and OT who recommended that patient would benefit from ongoing therapies in the acute inpatient rehabilitation setting. In review of the therapy notes,     OT: Min A for supine <> sit, sit <> supine due to support required to mobilize LLE, Min A for STS x 3 with pt holding second two stands for 60 seconds, VC required when pt standing for pt to keep hips forward and to look straight ahead, min A for use of urinal, SBA for LB bathing, dep for LBD      PT: Min-modA for all transfers. Supine-sit with modA1 for LLE support and cues, modA1 for stand pivot transfer.     Upon admission to ARU, he was feeling well. Pain is relatively controlled with tylenol, lidocaine patch and robaxin. Didn't want to take oxycodone due to fear of \"addiction\". Pain is mostly located at L lateral hip area; foot movement also makes the pain foot and is very uncomfortable. No new paresthesia; L foot numbness due to neuropathy and foot drop. Eating well. Was constipated but resolved. Voiding normally. Fatigue has been contributing as well but improving. "     Currently, the patient is medically appropriate and is assessed to have needs and will benefit from an inpatient acute rehabilitation comprehensive program working with PT and OT, and will also benefit from supervision and management of Rehab Nursing and Rehab MD.       PAST Medical History  Past Medical History:   Diagnosis Date     Advanced open-angle glaucoma      Antiplatelet or antithrombotic long-term use      Atrial fibrillation (H)      CKD (chronic kidney disease), stage III (H) 2005     Colon cancer (H)     Stage II-B colon cancer     Coronary artery disease     s/p CABG x 2, JEREMY x 2     Diverticulitis      Hyperlipidemia      Hypertension      Ischemic cardiomyopathy      MGUS (monoclonal gammopathy of unknown significance)      Nonsenile cataract     BE     Pacemaker      Polymorphic ventricular tachycardia (H)      Polymyalgia rheumatica (H)      PVD (posterior vitreous detachment), both eyes 2005     S/P ablation of atrial flutter 6/20/14    CTI     Stented coronary artery      SVT (supraventricular tachycardia) (H)     PPM/AICD for NSVT     Upper leg DVT (deep venous thromboembolism), chronic (H)     Left     Weight loss, unintentional 2017    15 lb in 4 months       Surgical History  Past Surgical History:   Procedure Laterality Date     C CABG, ARTERY-VEIN, FOUR  2006    LIMA-LAD, SVG-Rt PDA, SVG-OM2, SVG-Diag 1     C CABG, VEIN, SINGLE  1994    1-vessel CABG, SVG->PDRCA      CATARACT IOL, RT/LT Bilateral      COLONOSCOPY  3/13/2014    Procedure: COMBINED COLONOSCOPY, SINGLE BIOPSY/POLYPECTOMY BY BIOPSY;;  Surgeon: Mary Gerber MD;  Location:  GI     COLONOSCOPY N/A 6/22/2015    Procedure: COLONOSCOPY;  Surgeon: Marilin Newman MD;  Location:  GI     COLONOSCOPY N/A 11/7/2018    Procedure: COMBINED COLONOSCOPY, SINGLE OR MULTIPLE BIOPSY/POLYPECTOMY BY BIOPSY;  Surgeon: Roberto Esteban MD;  Location:  GI     EP ICD GENERATOR CHANGE DUAL N/A 12/11/2018    Procedure:  EP ICD Generator Change Dual;  Surgeon: Deedee Baird MD;  Location:  HEART CARDIAC CATH LAB     H ABLATION ATRIAL FLUTTER       HEART CATH DRUG ELUTING STENT PLACEMENT  4/19/2012    JEREMY x 2 to LCx     IMPLANT IMPLANTABLE CARDIOVERTER DEFIBRILLATOR  5-    ppm/aicd     KNEE SURGERY      right and left knee surgeries     LAPAROSCOPIC ASSISTED COLECTOMY Right 2/1/2019    Procedure: Laparoscopic Converted to Open Transverse Colectomy with Lysis of Adhesions;  Surgeon: Chanelle Guzmán MD;  Location:  OR     LAPAROSCOPIC ASSISTED COLECTOMY LEFT (DESCENDING)  4/8/2014    Procedure: LAPAROSCOPIC ASSISTED COLECTOMY LEFT (DESCENDING);  Hand assisted Laparoscopic Sigmoid Colectomy , Laparoscopic mobilization of spleenic flexure *Latex Allergy*Anesthesia General with Block;  Surgeon: Chanelle Guzmán MD;  Location:  OR     OPEN REDUCTION INTERNAL FIXATION RODDING INTRAMEDULLAR FEMUR FRACTURE TABLE Left 3/14/2019    Procedure: Open Reduction Internal Fixation Left Femur Intramedullar Nailing;  Surgeon: Srikanth Avalos MD;  Location:  OR     REPAIR VALVE MITRAL  2006     30-mm Medtronic Julian ring      SELECTIVE LASER TRABECULOPLASTY (SLT) OD (RIGHT EYE)  4/10, 1/12,+1/9/13    RE     SELECTIVE LASER TRABECULOPLASTY (SLT) OS (LEFT EYE)  5/2012     SHOULDER SURGERY      right rotator cuff       SOCIAL HISTORY  Marital Status:    Living situation: lives with his wife in a house with 3 steps to enter. 14 to the second floor and 12 to the basement. There is a shower and bathroom on the main floor but their house is very small and per wife's report there is not enough room to have even one bed on the main floor for him to sleep.   Family support: wife works 3 part time jobs; 2 at home and one at a retail store  Vocational History: retired; bought books for the [employer]  Tobacco use: quit 50 years ago  Alcohol use: occasionally     FAMILY HISTORY  Family History   Problem Relation Age of  Onset     C.A.D. Father      Anesthesia Reaction No family hx of      Crohn's Disease No family hx of      Ulcerative Colitis No family hx of      Cancer - colorectal No family hx of      Macular Degeneration No family hx of      Cancer No family hx of         No known family hx of skin cancer     Melanoma No family hx of      Skin Cancer No family hx of          Prior Functional history   Pt used a soft brace for chronic L foot drop and cane for ambulation.     Medications  Medications Prior to Admission   Medication Sig Dispense Refill Last Dose     acetaminophen (TYLENOL) 325 MG tablet Take 3 tablets (975 mg) by mouth every 8 hours        aspirin 81 MG tablet Take 1 tablet by mouth daily.   3/12/2019 at AM     atorvastatin (LIPITOR) 40 MG tablet Take 1 tablet (40 mg) by mouth daily as directed. 90 tablet 0 3/11/2019 at PM     bisacodyl (DULCOLAX) 10 MG suppository Place 1 suppository (10 mg) rectally daily as needed for constipation (IF Miralax ineffective)        Blood Pressure Monitor KIT 1 Units daily 1 kit 0 Taking     calcium carbonate 500 mg, elemental, (OSCAL;OYSTER SHELL CALCIUM) 500 MG tablet Take 1 tablet (500 mg) by mouth 2 times daily (with meals)        cholecalciferol 1000 units TABS Take 1,000 Units by mouth 2 times daily   3/12/2019 at AM     cyanocobalamin (VITAMIN B-12) 1000 MCG tablet Take 1,000 mcg by mouth 2 times daily   3/12/2019 at AM     doxazosin (CARDURA) 1 MG tablet Take 1 mg by mouth 2 times daily   3/12/2019 at AM     emollient (VANICREAM) external cream Apply topically as needed for other (FEET)   3/12/2019 at AM     enoxaparin (LOVENOX) 60 MG/0.6ML syringe Inject 0.65 mLs (65 mg) Subcutaneous every 12 hours        Lidocaine (LIDOCARE) 4 % Patch Place 1-2 patches onto the skin every 24 hours Apply to painful areas on left hip        methocarbamol (ROBAXIN) 750 MG tablet Take 1 tablet (750 mg) by mouth every 6 hours as needed for muscle spasms        metoprolol tartrate (LOPRESSOR)  "25 MG tablet Take 1 tablet (25 mg) by mouth 2 times daily 180 tablet 3 3/12/2019 at AM     mirtazapine (REMERON) 15 MG tablet Take 15 mg by mouth At Bedtime.   3/12/2019 at AM     Multiple Vitamins-Minerals (CENTRUM SILVER) per tablet Take 1 tablet by mouth daily. AM    3/12/2019 at AM     order for DME 20-30 mm Hg knee length compression stockings.  Measure and fit.  Style and color per patient preference.  Doff n Aracelis per patient need. 1 Units 0 Taking     polyethylene glycol (MIRALAX/GLYCOLAX) packet Take 17 g by mouth daily        senna-docusate (SENOKOT-S/PERICOLACE) 8.6-50 MG tablet Take 1-2 tablets by mouth 2 times daily        sertraline (ZOLOFT) 100 MG tablet Take 100 mg by mouth every evening.   3/11/2019 at PM     warfarin (COUMADIN) 5 MG tablet Take 1 tablet (5 mg) by mouth once for 1 dose 1 tablet 0      Warfarin Therapy Reminder 1 each continuous prn          ALLERGIES     Allergies   Allergen Reactions     Latex Rash     Rash         Review of Systems  A 10 point ROS was performed and negative unless otherwise noted in HPI.     Physical Exam  VITAL SIGNS:  /55 (BP Location: Left arm)   Pulse 60   Temp 96.6  F (35.9  C) (Oral)   Resp 16   Ht 1.702 m (5' 7\")   Wt 69 kg (152 lb 1.6 oz)   SpO2 98%   BMI 23.82 kg/m    BMI:  Estimated body mass index is 23.82 kg/m  as calculated from the following:    Height as of this encounter: 1.702 m (5' 7\").    Weight as of this encounter: 69 kg (152 lb 1.6 oz).     General: NAD, pleasant and cooperative   HEENT: ATNC, oropharynx clear with MMM, EOMI, PERRL    Pulmonary: clear breath sounds b/l, no rales/wheezing    Cardiovascular: RRR, no murmur   Abdominal: soft, non-tender, non-distended   Extremities: ++ edema at LLE, no tenderness in calves - incision with staples in place at L lateral hip and L flank area  MSK/neuro:   Mental Status:  alert and oriented x3    Cranial Nerves: grossly normal     Sensory: Normal to light touch in bilateral upper and " lower extremities except for L dorsal foot with impaired sensation   Strength: 5/5 at bilateral upper extremities and RLE. L HF limited by pain, KF/KE also limited by pain but with good resistance. DF 3/5, EHL 2/5, PF 4/5 limited by pain and tested in supine position.    Speech: clear and coherent    Cognition: intact       Labs  Pertinent labs     Lab Results   Component Value Date    WBC 5.7 03/19/2019    HGB 7.6 (L) 03/19/2019    HCT 24.7 (L) 03/19/2019    MCV 94 03/19/2019     03/19/2019     Lab Results   Component Value Date     03/19/2019    POTASSIUM 3.9 03/19/2019    CHLORIDE 110 (H) 03/19/2019    CO2 26 03/19/2019     (H) 03/19/2019     Lab Results   Component Value Date    GFRESTIMATED 56 (L) 03/19/2019    GFRESTBLACK 65 03/19/2019     Lab Results   Component Value Date    AST 35 12/19/2018    ALT 25 12/19/2018    ALKPHOS 84 12/19/2018    BILITOTAL 0.3 12/19/2018    BILICONJ 0.0 03/24/2014     Lab Results   Component Value Date    INR 1.67 (H) 03/19/2019     Lab Results   Component Value Date    BUN 38 (H) 03/19/2019    CR 1.18 03/19/2019         ASSESSMENT/PLAN:  Noe Florence is a 83 year old right hand dominant male who was admitted on 3//12 after a fall resulting in L intertrochanteric femoral fracture s/p ORIF on 3/14. 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     Admission to acute inpatient rehab 03/19/19.    Impairment group code: 08.11      1. PT, and OT 90 minutes of each on a daily basis, in addition to rehab nursing and close management of physiatrist.      2. Impairment of ADL's: OT for 90 min daily to  work on upper and lower body self care, dressing, toileting, bathing, energy conservation techniques with use of ADs as needed.     3. Impairment of mobility:  PT for 90 min daily to work on strengthening, endurance buildup, transfers with use of walker as needed.     4. Rehab RN to administer medication, patient education on medication taking, VS monitoring, bowel regimen, and wound care/surgical wound dressing changes and monitoring.     1. Medical Conditions  # L intertrochanteric femoral fracture s/p ORIF on 3/14  --WBAT  --incision open to air; will remove staples at 2 week post-op  --pain control by scheduled tylenol, lidoderm, prn oxycodone and robaxin  --should f/u with ortho team as outpatient      # Anemia: acute blood loss on chronic (ACD). Stable. Will transfuse if hgb < 7.     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 Noted that he didn't know that the DVT has not resolved. US on 3/18 showed possible acute on chronic DVT.        # Afib s/p ablation and on coumadin, VT s/p ICD placement, CAD s/p CABG x2: followed by cardiology and EP teams as outpatient. Continue metoprolol, ASA 81 and coumadin (lovenox bridge).       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 Duarte as outpatient; last f/u in March 2019. Stable per notes.       # HTN: continue cardura, and metoprolol.     # HLD: continue lipitor.     # CKD3: stable.       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 Should f/u with oncology team as outpatient.     # H/o depression: well controlled on current regimen. Continue sertraline and remeron.     2. Adjustment to disability:  Seem to be coping well. Clinical psychology to eval and treat if needed.   3. FEN:  regular diet  4. Bowel: constipation resolved. Continue scheduled and prn bowel meds.   5. Bladder: continent; will check PVRs.   6. DVT Prophylaxis: on warfarin with lovenox bridge.   7. GI Prophylaxis: none  8. Code: full - confirmed on admission to ARU  9. Disposition: home  10. ELOS:  10-14 days      Moon Irizarry MD  Physical Medicine & Rehabilitation    100 minutes spent with admission, more than 50% in direct patient interaction and education, the remainder in coordination of care.

## 2019-03-19 NOTE — PLAN OF CARE
Discharge Planner PT   Patient plan for discharge: ARU  Current status: Min-modA for all transfers. Supine-sit with modA1 for LLE support and cues, modA1 for stand pivot transfer. Session terminated as pt ARU transport arrived.  Barriers to return to prior living situation: Impaired functional mobility.  Recommendations for discharge: ARU  Rationale for recommendations: Current status, ability to tolerate 3 hours of therapy, multiple therapy needs, significant difference between current/PLOF, discharging there already       Entered by: Chino Petersen 03/19/2019 2:31 PM     Physical Therapy Discharge Summary    Reason for therapy discharge:    Discharged to acute rehabilitation facility.    Progress towards therapy goal(s). See goals on Care Plan in [facility] electronic health record for goal details.  Goals not met.  Barriers to achieving goals:   discharge from facility.    Therapy recommendation(s):    Continued therapy is recommended.  Rationale/Recommendations:  Still with significant mobility impairments and therapy needs..

## 2019-03-19 NOTE — PHARMACY-ANTICOAGULATION SERVICE
Clinical Pharmacy - Warfarin Dosing Consult     Pharmacy has been consulted to manage this patient s warfarin therapy.  Indication: DVT/ PE Treatment;Atrial Fibrillation  Therapy Goal: INR 2-3  Warfarin Prior to Admission: Yes  Warfarin PTA Regimen: 2.5 mg QTuesday and Saturday, 5 mg all other days.   Significant drug interactions: Aspirin and Lovenox-both increase the risk of bleeding.     INR   Date Value Ref Range Status   03/19/2019 1.67 (H) 0.86 - 1.14 Final   03/18/2019 1.83 (H) 0.86 - 1.14 Final       Recommend warfarin 5 mg today.  Pharmacy will monitor Neo Florence daily and order warfarin doses to achieve specified goal.      Please contact pharmacy as soon as possible if the warfarin needs to be held for a procedure or if the warfarin goals change.      Janice Donato, PharmD, BCPS

## 2019-03-19 NOTE — PLAN OF CARE
Pt has been a/o x 4 overnight. VSS. Afebrile. Sats 98%RA. Had scheduled tylenol for pain. R leg dressings x 3 CDI. Continues to have LLE edema +3. CMS intact. Using urinal in bed. Voiding small amounts. Bed alarm on for safety but pt has not tried to get OOB. Calls appropriately. Awaiting am HGB results. Will continue to monitor pt.

## 2019-03-19 NOTE — PROGRESS NOTES
"SPIRITUAL HEALTH SERVICES  SPIRITUAL ASSESSMENT Progress Note  [facility]     REFERRAL SOURCE: Follow up from 3/14    Visited pt and his significant other and offered emotional support. Pt said that he is doing well and technically getting discharge at 2:15 this afternoon and is going to an acute rehab. Pt narrated his history of health challenges and said \"it has been a roller coaster,\" that he would like to get well and be up on his feet again. He thanked the [facility] for stopping by.    PLAN: No follow up needed at this time since the pt is getting discharged today.    Rae García  Chaplain Resident  Pager  [phone]    "

## 2019-03-19 NOTE — PLAN OF CARE
Vitals:    03/18/19 2355 03/19/19 0337 03/19/19 0730 03/19/19 1300   BP: 118/63 120/56 128/58 102/48   BP Location: Left arm Left arm Left arm Left arm   Pulse: 63 60 60 60   Resp: 16 16 16 16   Temp: 97.1  F (36.2  C) 97.7  F (36.5  C) 97  F (36.1  C) 96.7  F (35.9  C)   TempSrc: Oral Oral Oral Oral   SpO2: 97% 96% 97% 98%     Pt expressed feeling better and ready to transfer to Rehab per plan of care. Good appetite no BM this shift,voided spontaneously adequate clear urine Passing gas.  Pain well managed with Robaxin and scheduled Tylenol.   Left Lower Ex swelling no change. CMS intact hip  x3 surgical sites staples intact the top two has been noted with small serosang drainage and dressing changed.   Pt transferred to [place] re[facility]  per plan. Report given to the receiving RN (Jeny)

## 2019-03-19 NOTE — PLAN OF CARE
Patient states good pain relief with Robaxin.Transferred with assist 2,walker,noted leaning forward when stood, some difficulty with taking step.Tolerated in chair.Tolerated food/fluid intake without apparent GI upset.Uses IS with accurate technique.Continue to monitor.

## 2019-03-19 NOTE — PLAN OF CARE
FOCUS/GOAL  Safety management    ASSESSMENT, INTERVENTIONS AND CONTINUING PLAN FOR GOAL:  Pt arrived to ARU at end of shift, via [facility] transport by wheelchair. Phone report received from Dk RN at previous unit 7B. Ao2 for transfer from w/c to bed. Educated on how to use call light and phone, instructed to call for assistance with all transfers. Pt verbalized understanding, bed alarm on for safety. Introduced to oncoming RN and bedside report given, nursing to continue with admission process.

## 2019-03-19 NOTE — TELEPHONE ENCOUNTER
[facility] Call Center    Phone Message    May a detailed message be left on voicemail: yes    Reason for Call: Other: Pts discharge summary states to follow up with ortho in 2 weeks and then 6 weeks post discharge. Thanks,     Action Taken: Message routed to:  [facility] ([facility]): Orthopedics

## 2019-03-20 LAB — INR PPP: 1.85 (ref 0.86–1.14)

## 2019-03-20 PROCEDURE — 85610 PROTHROMBIN TIME: CPT | Performed by: PHYSICAL MEDICINE & REHABILITATION

## 2019-03-20 PROCEDURE — 97166 OT EVAL MOD COMPLEX 45 MIN: CPT | Mod: GO | Performed by: OCCUPATIONAL THERAPIST

## 2019-03-20 PROCEDURE — 97530 THERAPEUTIC ACTIVITIES: CPT | Mod: GO | Performed by: OCCUPATIONAL THERAPIST

## 2019-03-20 PROCEDURE — 12800006 ZZH R&B REHAB

## 2019-03-20 PROCEDURE — 97530 THERAPEUTIC ACTIVITIES: CPT | Mod: GP

## 2019-03-20 PROCEDURE — 97162 PT EVAL MOD COMPLEX 30 MIN: CPT | Mod: GP

## 2019-03-20 PROCEDURE — 36415 COLL VENOUS BLD VENIPUNCTURE: CPT | Performed by: PHYSICAL MEDICINE & REHABILITATION

## 2019-03-20 PROCEDURE — 25000128 H RX IP 250 OP 636: Performed by: PHYSICAL MEDICINE & REHABILITATION

## 2019-03-20 PROCEDURE — 97535 SELF CARE MNGMENT TRAINING: CPT | Mod: GO | Performed by: OCCUPATIONAL THERAPIST

## 2019-03-20 PROCEDURE — 25000132 ZZH RX MED GY IP 250 OP 250 PS 637: Performed by: PHYSICAL MEDICINE & REHABILITATION

## 2019-03-20 RX ORDER — WARFARIN SODIUM 5 MG/1
5 TABLET ORAL
Status: COMPLETED | OUTPATIENT
Start: 2019-03-20 | End: 2019-03-20

## 2019-03-20 RX ADMIN — METOPROLOL TARTRATE 25 MG: 25 TABLET, FILM COATED ORAL at 21:48

## 2019-03-20 RX ADMIN — MIRTAZAPINE 15 MG: 7.5 TABLET ORAL at 21:42

## 2019-03-20 RX ADMIN — METHOCARBAMOL 750 MG: 750 TABLET, FILM COATED ORAL at 03:03

## 2019-03-20 RX ADMIN — METOPROLOL TARTRATE 25 MG: 25 TABLET, FILM COATED ORAL at 08:21

## 2019-03-20 RX ADMIN — CALCIUM 500 MG: 500 TABLET ORAL at 08:21

## 2019-03-20 RX ADMIN — VITAMIN D, TAB 1000IU (100/BT) 1000 UNITS: 25 TAB at 08:21

## 2019-03-20 RX ADMIN — CYANOCOBALAMIN TAB 1000 MCG 1000 MCG: 1000 TAB at 08:21

## 2019-03-20 RX ADMIN — DOXAZOSIN MESYLATE 1 MG: 1 TABLET ORAL at 08:21

## 2019-03-20 RX ADMIN — DOXAZOSIN MESYLATE 1 MG: 1 TABLET ORAL at 21:42

## 2019-03-20 RX ADMIN — MULTIPLE VITAMINS W/ MINERALS TAB 1 TABLET: TAB at 08:21

## 2019-03-20 RX ADMIN — ACETAMINOPHEN 975 MG: 325 TABLET, FILM COATED ORAL at 03:03

## 2019-03-20 RX ADMIN — WARFARIN SODIUM 5 MG: 5 TABLET ORAL at 18:18

## 2019-03-20 RX ADMIN — ASPIRIN 81 MG CHEWABLE TABLET 81 MG: 81 TABLET CHEWABLE at 08:21

## 2019-03-20 RX ADMIN — ACETAMINOPHEN 975 MG: 325 TABLET, FILM COATED ORAL at 11:20

## 2019-03-20 RX ADMIN — ATORVASTATIN CALCIUM 40 MG: 40 TABLET, FILM COATED ORAL at 08:21

## 2019-03-20 RX ADMIN — ACETAMINOPHEN 975 MG: 325 TABLET, FILM COATED ORAL at 18:15

## 2019-03-20 RX ADMIN — CALCIUM 500 MG: 500 TABLET ORAL at 18:18

## 2019-03-20 RX ADMIN — SERTRALINE HYDROCHLORIDE 100 MG: 100 TABLET ORAL at 21:42

## 2019-03-20 RX ADMIN — SENNOSIDES AND DOCUSATE SODIUM 2 TABLET: 8.6; 5 TABLET ORAL at 08:21

## 2019-03-20 RX ADMIN — ENOXAPARIN SODIUM 60 MG: 60 INJECTION SUBCUTANEOUS at 09:58

## 2019-03-20 RX ADMIN — SENNOSIDES AND DOCUSATE SODIUM 1 TABLET: 8.6; 5 TABLET ORAL at 21:43

## 2019-03-20 RX ADMIN — VITAMIN D, TAB 1000IU (100/BT) 1000 UNITS: 25 TAB at 21:42

## 2019-03-20 RX ADMIN — ENOXAPARIN SODIUM 60 MG: 60 INJECTION SUBCUTANEOUS at 21:50

## 2019-03-20 RX ADMIN — CYANOCOBALAMIN TAB 1000 MCG 1000 MCG: 1000 TAB at 21:42

## 2019-03-20 ASSESSMENT — ACTIVITIES OF DAILY LIVING (ADL): PREVIOUS_RESPONSIBILITIES: MEAL PREP;HOUSEKEEPING;LAUNDRY;SHOPPING;MEDICATION MANAGEMENT;FINANCES

## 2019-03-20 NOTE — PLAN OF CARE
FOCUS/GOAL  Bowel management, Bladder management, Pain management and Wound care management    ASSESSMENT, INTERVENTIONS AND CONTINUING PLAN FOR GOAL:  Patient is A&O x4. Makes needs known. Assist of 1 w/ walker. No complaints of pain during shift. L hip and L leg incisions were WDL except for serosanguinous/yellow drainage. Both were cleansed and had new dressings applied. Continent of bowel and bladder. PVRs done x2 and both were 0 ml. Last BM was 3/20/19. Continue with plan of care.

## 2019-03-20 NOTE — PROGRESS NOTES
Social Work: Initial Assessment with Discharge Plan    Patient Name: Noe Florence  : 1935  Age: 83 year old  MRN: [id number]  Completed assessment with: Pt  Admitted to ARU: 3/19/19    Presenting Information   Date of SW assessment: 2019  Health Care Directive: Copy in Chart  Primary Health Care Agent: Rica Levine - significant other  Secondary Health Care Agent: See Florence - Pt son  Living Situation: Pt lives in [place] with his significant otherRica  Previous Functional Status: Independent  DME available: See therapy notes  Patient and family understanding of hospitalization: Hip fracture - fell on the ice brining in garbage can  Cultural/Language/Spiritual Considerations: English speaking, Pres[employer]beterian per chart review    Physical Health  Reason for admission: Hip fracture    Provider Information   Primary Care Physician:Roberto Sarmiento [facility]  : None    Mental Health/Chemical Dependency:   Diagnosis: Pt reported some general depression due to hospitalization, but no concerns  Alcohol/Tobacco/Narcotis: Pt reported that he drinks occasionally, stopped smoking years ago  Support/Services in Place: none  Services Needed/Recommended: none  Sexuality/Intimacy: No concerns, heterosexual    Support System  Marital Status: , lives with significant other Rica.  Pt reported that they enjoy going to [employer] and the [employer], other shows  Family support: SonSee  Other support available: [employer], member of [employer], friends    Community Resources  Current in home services: None  Previous services: Pt reported that he had home care in the past, but couldn't recall the company    Financial/Employment/Education  Employment Status: Retired - Pt bought books, primarily for the [employer]  Income Source: Social security, [employer], Rica gets social security and works PT  Education: Bachelors degree from [employer], some graduate  credits  Financial Concerns:  None  Insurance: [facility] medicare    Discharge Plan   Patient and family discharge goal: Pt goal is to discharge home, live independently, go out to films, concerts  Provided Education on discharge plan: YES  Patient agreeable to discharge plan:  YES  Provided education and attained signature for Medicare IM and IRF Patient Rights and Privacy Information provided to patient : Yes  Provided patient with [place] [facility] Resources: No  Barriers to discharge: medical clearance, progress in therapy, safe discharge plan    Discharge Recommendations   Disposition: Pt goal is to return home with supports as needed.  He reported that he has had home care previously.  Transportation Needs: Pt reported that he uses public transportation, Rica or son can drive  Name of Transportation Company and Phone: Public Transport    Additional comments   Pt is a 83 year old male admitted to ARU on 3/19/19 for rehabilitation following a hip fracture from falling on the ice.  Pt lives in [place] with his significant other, Rica and [place]  Pt reported that he enjoys going out to shows, [place] [employer] and [employer].  Pt goal is to return home and live independently again, is open to home care as he has used them previously following surgery.  SW will continue to follow through discharge.     Please invite to Care Conference:  Rica [phone], See [phone]    YUE Reeves, [facility]  [place]   Phone: [phone]  Pager: [phone]       03/20/19 0900   General Information   Did the Initial Social Work Assessment result in a Social Work Case? Yes   Living Environment   Lives With significant other   Name(s) of Who Lives With Patient Rica   Current Living Arrangements home/apartment/condo   Home Safety   Patient Feels Safe Living in Home? yes   Employment   Do you work full or part-time? no   Abuse Screen (yes response referral  indicated)   Feels Unsafe at Home or Work/School no   Feels Threatened by Someone no   Does Anyone Try to Keep You From Having Contact with Others or Doing Things Outside Your Home? no   Physical Signs of Abuse Present no   Discharge Planning   Patient/Family Anticipates Transition to home

## 2019-03-20 NOTE — H&P
Post Admission Physician Evaluation:    I have compared Noe Florence's condition on admission to acute rehabilitation to that outlined in the preadmission screen. History and physical exam performed by me. No significant differences are identified and the patient remains appropriate for an inpatient rehabilitation facility level of care to manage medical issues and address functional impairments due to (rehabilitation diagnosis) L intertrochanteric femoral fracture after a fall s/p ORIF.    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     Prior functional level: mod I with mobility and ADLs using a cane and L AFO    Present function: requires min to mod A for transfers; very short ambulation with FWW.     Anticipated rehabilitation course: improvement to mod I level with mobility and ADLs; stairs are big barrier for discharge to home.     Will benefit from intensive rehabilitation includin minutes each of PT and OT  Rehabilitation nursing  Close management by physiatry    Prognosis: good medical and rehab prognosis    Estimated length of stay: 10-14 days     Moon Irizarry MD  Physical Medicine & Rehabilitation

## 2019-03-20 NOTE — PLAN OF CARE
Pt below baseline for functional mobility. Pt presents with LLE weakness, decreased activity tolerance, impaired balance, impaired transfers, STS, amb, and stair navigation due to hip fracture and ORIF. Pt is able to STS with CGA, stand pivot transfer CGA, amb 15' with FWW CGA. Care plan is complicated by nonaccessible home, previous L foot drop, and other comorbidities. ELOS 14 days to achieve PT goals and be able to return home with OP PT.

## 2019-03-20 NOTE — PLAN OF CARE
FOCUS/GOAL  Bowel management, Bladder management, Pain management     ASSESSMENT, INTERVENTIONS AND CONTINUING PLAN FOR GOAL:      Patient is A&Ox4, continent of bowel/bladder. Patient uses the urinal at bedside, staff empties. PVR: 45. Patient c/o left hip/leg pain, scheduled Tylenol and PRN Robaxin given which provided some relief. No BM this shift. Continue POC.

## 2019-03-20 NOTE — PROGRESS NOTES
FOCUS/GOAL  Bowel management, Bladder management, Wound care management and Mobility    ASSESSMENT, INTERVENTIONS AND CONTINUING PLAN FOR GOAL:  Pt is alert and oriented x4, VSS, denies SOB or nausea. Pt ate 100% of dinner. Pt is able to stand pivot with assist of 1 to BSC or wheelchair. Pt has been continent of B&B this shift, had large formed BM in toilet. Retaining some urine, PVRs 135 and 141. Dressings are required over 2 upper incisions on left leg as they are draining moderate amounts of serosanguinous drainage still. Pt taking medications whole, uses urinal independently in bed.

## 2019-03-20 NOTE — PROGRESS NOTES
OT eval completed and tx initiated. Pt requires set-up and SBA with UBD seated. Pt requires max A with LBD tasks. Pt requires min A with toilet transfer with FWW, and total A with toilet hygiene.     Recommend: ambulate to/from bathroom for toileting during the day with FWW. Use commode overlay for toileting during the day. Use bedside commode at night with stand pivot transfer with FWW.     Anticipate LOS: 14 days, f/u OT pending pt's progress.  Pt will likely need reacher, sock aid, LHSH, long handled sponge, and grab bars.

## 2019-03-20 NOTE — TELEPHONE ENCOUNTER
Scheduled for a Nurse visit with Tanesha 4/3/19 at 10:30pm.  Scheduled for the 6 week appt.  With Dr. Avalos 4/30/19/ at 12:40pm.

## 2019-03-20 NOTE — PROGRESS NOTES
03/20/19 1100   Quick Adds   Type of Visit Initial Occupational Therapy Evaluation   Living Environment   Lives With significant other   Living Arrangements house   Home Accessibility stairs to enter home;stairs within home   Number of Stairs, Main Entrance 3   Stair Railings, Main Entrance none   Number of Stairs, Within Home, Primary other (see comments)  (7 steps landing another 7 steps to go upstairs& downstairs)   Stair Railings, Within Home, Primary railings on both sides of stairs   Transportation Anticipated family or friend will provide;public transportation   Living Environment Comment OT: Pt lives in a 2 story house with significant other. Laundry room is located in basement. Bedroom is located on second floor. Pt has full bath on main floor and upstairs. Main floor has walk in shower on main floor, and tub/shower combo upstairs. Pt has a queen sized bed and gets out on the right side. Pt has tile radha in kitchen, dining room, and bathroom. Pt has area rugs in various rooms of the house.    Self-Care   Usual Activity Tolerance good   Current Activity Tolerance fair   Regular Exercise Yes   Activity/Exercise Type strength training   Exercise Amount/Frequency 3-5 times/wk   Equipment Currently Used at Home cane, straight;grab bar, toilet;grab bar, tub/shower;shower chair   Activity/Exercise/Self-Care Comment OT: Pt enjoys playing tennis. Pt reports doing strengthening exercises several times a week. Pt enjoys watching movies and going to concerts.    Functional Level   Ambulation 0-->independent   Transferring 0-->independent   Toileting 0-->independent   Bathing 1-->assistive equipment   Dressing 0-->independent   Eating 0-->independent   Communication 0-->understands/communicates without difficulty   Swallowing 0-->swallows foods/liquids without difficulty   Cognition 0 - no cognition issues reported   Fall history within last six months yes   Number of times patient has fallen within last six months  "1   Which of the above functional risks had a recent onset or change? ambulation;transferring;toileting;bathing;dressing;fall history   Prior Functional Level Comment OT: Pt was IND with ADLs/IADLs and functional mobility.    General Information   Onset of Illness/Injury or Date of Surgery - Date 03/12/19   Referring Physician Dr. Moon Irizarry   Patient/Family Goals Statement OT: \"To be IND as reasonable and be able to move on my own again.\"   Additional Occupational Profile Info/Pertinent History of Current Problem OT: Per chart review, pt \"is a 83 year old right hand dominant male who was admitted on 3/12 after a fall resulting in L intertrochanteric femoral fracture s/p ORIF on 3/14. 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 He clinically improved and was transferred to ARU today. 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 and a left peroneal neuropathy.\"   Precautions/Limitations fall precautions   Weight-Bearing Status - LLE weight-bearing as tolerated   Heart Disease Risk Factors Family history;Medical history;Gender;Age   Cognitive Status Examination   Orientation orientation to person, place and time   Visual Perception   Visual Perception Comments OT: Pt wears cheaters    Sensory Examination   Sensory Quick Adds No deficits were identified   Pain Assessment   Patient Currently in Pain Yes, see Vital Sign flowsheet  (7/10 while standing )   Integumentary/Edema   Integumentary/Edema Comments OT: 2+ edema with LLE   Posture   Posture forward head position;kyphosis   Range of Motion (ROM)   ROM Comment OT: WFL with all planes with BUE's. Pt requires increased time to reach full ROM with Right shoulder   Strength "   Strength Comments OT: LUE: 4/5 with sh. flex, sh. abd, elb. flex/ext, wrist flex/ext and . RUE: sh. flex and sh. abd: 3/5, elb. flex/ext: 3+/5, wrist flex/ext: 4/5 and : 4/5   Hand Strength   Left hand  (pounds) 64 pounds   Right hand  (pounds) 43 pounds   Coordination   Left hand, nine hole peg test (seconds) 34   Right hand, nine hole peg test (seconds) 37   ARC Assessment Only   Acute Rehab FIM See FIM scores for Mobility/ADL Assessment   Instrumental Activities of Daily Living (IADL)   Previous Responsibilities meal prep;housekeeping;laundry;shopping;medication management;finances   IADL Comments OT: S.O. assists with shoveling.    Activities of Daily Living Analysis   Impairments Contributing to Impaired Activities of Daily Living balance impaired;cognition impaired;coordination impaired;fear and anxiety;flexibility decreased;motor control impaired;pain;post surgical precautions;postural control impaired;ROM decreased;strength decreased   General Therapy Interventions   Planned Therapy Interventions ADL retraining;IADL retraining;balance training;bed mobility training;cognition;groups;motor coordination training;ROM;strengthening;stretching;transfer training;home program guidelines;progressive activity/exercise;risk factor education   Clinical Impression   Criteria for Skilled Therapeutic Interventions Met yes, treatment indicated   OT Diagnosis OT: Decreased safety and IND with ADLs and IADLs   Influenced by the following impairments OT: Impaired strength, impaired balance, impaired standing tolerance, LLE pain   Assessment of Occupational Performance 3-5 Performance Deficits   Identified Performance Deficits OT: Performance deficits include: dressing, toileting, G/H tasks, bathing, and all IADLs   Clinical Decision Making (Complexity) Moderate complexity   Therapy Frequency daily   Predicted Duration of Therapy Intervention (days/wks) 14 days   Anticipated Equipment Needs at Discharge  bathing equipment;dressing equipment;shower chair   Anticipated Discharge Disposition Home with Assist;Home with Home Therapy;Home with Outpatient Therapy   Risks and Benefits of Treatment have been explained. Yes   Patient, Family & other staff in agreement with plan of care Yes   Clinical Impression Comments OT: Pt would benefit from skilled OT services d/t recent decline in safety and IND with ADLs/IADLs and functional mobility.    Total Evaluation Time   Total Evaluation Time (Minutes) 25

## 2019-03-20 NOTE — PROGRESS NOTES
"CLINICAL NUTRITION SERVICES - ASSESSMENT NOTE     Nutrition Prescription    RECOMMENDATIONS FOR MDs/PROVIDERS TO ORDER:  None    Malnutrition Status:    Does not meet criteria    Recommendations already ordered by Registered Dietitian (RD):  Boost Plus with breakfast    Future/Additional Recommendations:  1. Follow up for supplement acceptance and need to adjust flavors/ timings.  2. Consider review of heart healthy diet prior to discharge (given cardiac history).     REASON FOR ASSESSMENT  Noe Florence is a/an 83 year old male assessed by the dietitian for Admission Nutrition Risk Screen for unintentional loss of 10# or more in the past two months and Provider Order - patient s/p surgery with multiple medical co-morbidities. please educate and assist with appropriate diet.    NUTRITION HISTORY  Patient reports following a regular diet at home with baseline good po intake of 3 meals/day. He drinks Ensure supplements and muscle milk at baseline.    CURRENT NUTRITION ORDERS  Diet: Regular  Intake/Tolerance: Chart review suggests patient consumed 100% of dinner last night. Patient reports a good appetite and anticipates being able to eat adequately during his stay. He agrees to Boost supplements.    LABS  Labs reviewed    MEDICATIONS  Medications reviewed  Multivitamin/ minerals  Calcium carbonate  Vitamin D3  Vitamin B12    ANTHROPOMETRICS  Height: 170.2 cm (5' 7\")  Most Recent Weight: 69 kg (152 lb 1.6 oz)    IBW: 66.8 kg  BMI: Normal BMI  Weight History: No weights recorded during initial hospitalization. Current weight appears to be up from previous trends, possibly fluid related.  Wt Readings from Last 5 Encounters:   03/19/19 69 kg (152 lb 1.6 oz)   03/05/19 64.9 kg (143 lb)   02/19/19 65.1 kg (143 lb 9.6 oz)   02/18/19 65.7 kg (144 lb 12.8 oz)   02/06/19 64.3 kg (141 lb 12.8 oz)       Dosing Weight: 69 kg (current weight)    ASSESSED NUTRITION NEEDS  Estimated Energy Needs: 2319-6568 kcals/day (25 - 30 " kcals/kg)  Justification: Maintenance  Estimated Protein Needs:  grams protein/day (1.2 - 1.5 grams of pro/kg)  Justification: Post-op (pending renal function)  Estimated Fluid Needs: 1 mL/kcal or per MD goals   Justification: Maintenance    PHYSICAL FINDINGS  See malnutrition section below.     MALNUTRITION  % Intake: No decreased intake noted  % Weight Loss: None noted  Subcutaneous Fat Loss: None observed  Muscle Loss: Lower arm  (forearm):  mild  Fluid Accumulation/Edema: Does not meet criteria  Malnutrition Diagnosis: Patient does not meet two of the above criteria necessary for diagnosing malnutrition    NUTRITION DIAGNOSIS  Predicted inadequate nutrient intake (energy/ protein) related to hospitalization with potential for menu fatigue as evidenced by anticipated intake less than estimated needs.    INTERVENTIONS  Implementation  Nutrition Education: Provided education on importance of adequate protein intake to support healing post op.    Medical food supplement    Goals  Patient to consume % of nutritionally adequate meal trays TID, or the equivalent with supplements/snacks.     Monitoring/Evaluation  Progress toward goals will be monitored and evaluated per protocol.      Cleo Tolliver, MS, RD, LD

## 2019-03-20 NOTE — PROGRESS NOTES
03/20/19 0815   Quick Adds   Type of Visit Initial PT Evaluation       Present no   Language English   Living Environment   Lives With spouse   Living Arrangements house   Home Accessibility stairs to enter home;stairs within home   Number of Stairs, Main Entrance 3   Stair Railings, Main Entrance none  (Pt stated they will put in a railing)   Number of Stairs, Within Home, Primary other (see comments)  (14)   Stair Railings, Within Home, Primary railing on left side (ascending);railings on both sides of stairs   Transportation Anticipated family or friend will provide  (wife drives sedan)   Living Environment Comment PT: Pt lives in a 2 story house with significant other. Laundry room is located in basement with 12 stairs and L railing. Bedroom is located on second floor with 14 stairs and 2 railings. Pt has full bath on main floor and upstairs. Main floor has walk in shower on main floor, and tub/shower combo upstairs. Pt has a queen sized bed and gets out on the right side. Pt has tile radha in kitchen, dining room, and bathroom. Pt has area rugs in various rooms of the house.    Self-Care   Usual Activity Tolerance good   Current Activity Tolerance fair   Regular Exercise Yes   Activity/Exercise Type walking;strength training   Equipment Currently Used at Home cane, straight   Activity/Exercise/Self-Care Comment therband strength training 3-5 times per week   Functional Level Prior   Ambulation 0-->independent   Transferring 0-->independent   Toileting 0-->independent   Bathing 0-->independent   Communication 0-->understands/communicates without difficulty   Swallowing 0-->swallows foods/liquids without difficulty   Cognition 0 - no cognition issues reported   Fall history within last six months yes   Number of times patient has fallen within last six months 2  (on ice, and once when therband broke while doing exercises)   Which of the above functional risks had a recent onset or  change? ambulation;transferring;toileting;bathing;dressing;fall history   Prior Functional Level Comment Pt was IND with ADLs/IADLs and functional mobility.    General Information   Onset of Illness/Injury or Date of Surgery - Date 03/12/19   Referring Physician Dr. Moon Irizarry   Patient/Family Goals Statement getting moving on his own again   Pertinent History of Current Problem (include personal factors and/or comorbidities that impact the POC) Per chart: Noe Florence is a 83 year old right hand dominant male who was admitted on 3/12 after a fall resulting in L intertrochanteric femoral fracture s/p ORIF on 3/14. 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 He clinically improved and was transferred to ARU today. 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    Weight-Bearing Status - LUE full weight-bearing   Weight-Bearing Status - RUE full weight-bearing   Weight-Bearing Status - LLE weight-bearing as tolerated   Weight-Bearing Status - RLE full weight-bearing   Heart Disease Risk Factors High blood pressure;Stress;Medical history;Gender;Age   General Info Comments Pt greeted PT in bed with head of bed raised.   Cognitive Status Examination   Orientation orientation to person, place and time   Level of Consciousness alert   Follows Commands and Answers Questions 100% of the time   Personal Safety and Judgment intact   Memory intact   Pain Assessment   Patient Currently in Pain Yes, see Vital Sign flowsheet  (Pt reports pain in muscles and joint around fracture)   Integumentary/Edema   Integumentary/Edema other (describe)   Integumentary/Edema Comments Pt has LLE edema 2+ in lower leg,  pitting from sock noted, but very little swelling in L foot. Compression administered by OT.   Posture    Posture Forward head position;Protracted shoulders;Kyphosis   Posture Comments PT: pt has forward lean in head and torso in sitting and more prominent lean in standing   Range of Motion (ROM)   ROM Comment Pt has global ROM WFL, outside RUE overhead motion due to past rotator cuff tear, and maximal hip ROM deficits in L hip due to pain. PT did not screen max ROM in LLE due to pain.   Strength   Strength Comments Pt reported mild pain with R shoulder MMT due to past rotator cuff tear. Pt reported pain with bilateral hip MMT on L hip and incision site.   MMT: Shoulder   Shoulder Flexion - Left Side (4/5) good, left   Shoulder Extension - Left Side (4/5) good, left   Shoulder ABduction - Left Side (4/5) good, left   Shoulder External Rotation - Left Side (4/5) good, left   Shoulder Internal Rotation - Left Side (4/5) good, left   Shoulder Flexion - Right Side (4/5) good, right   Shoulder Extension - Right Side (4/5) good, right   Shoulder ABduction - Right Side (4/5) good, right   Shoulder Internal Rotation - Right Side (4/5) good, right   Shoulder External Rotation - Right Side (4/5) good, right   MMT: Elbow/Forearm, Rehab Eval   Elbow Flexion - Left Side (5/5) normal,left   Elbow Extension - Left Side (5/5) normal,left   Elbow Flexion - Right Side (5/5) normal,right   Elbow Extension - Right Side (5/5) normal,right   MMT: Hip, Rehab Eval   Hip Flexion - Left Side (2/5) poor, left   Hip Extension - Left Side (2/5) poor, left   Hip ABduction - Left Side (2/5) poor, left   Hip ADduction - Left Side (2/5) poor, left   Hip Flexion - Right Side (4/5) good, right   Hip Extension - Right Side (4/5) good, right   Hip ABduction - Right Side (5/5) normal,right   Hip ADduction - Right Side (5/5) normal,right   MMT: Knee, Rehab Eval   Knee Flexion - Left Side (2/5) poor, left   Knee Extension - Left Side (2/5) poor, left    Knee Flexion - Right Side (5/5) normal,right   Knee Extension - Right Side (5/5) normal,right   MMT: Ankle, Rehab Eval   Ankle Dorsiflexion - Left Side (1+/5) trace plus, left   Ankle Plantarflexion - Left Side (4/5) good, left   Ankle Dorsiflexion - Right Side (4/5) good, left   Ankle Plantarflexion - Right Side (4/5) good, left   ARC Assessment Only   Acute Rehab FIM See FIM scores for Mobility/ADL Assessment   Balance   Balance other (describe)   Balance Comments Pt was able to sit EOB IND, pt needing BUE of FWW for standing balance with little to no weightbearing through LLE. Pt able to slide RLE across floor with minimal LLE WB for mobility   Sensory Examination   Sensory Perception other (describe)   Sensory Perception Comments Pt has full BLE propioception with testing, pt has altered LLE light touch at ankle and foot from longstand neuropathy with cooccurence of foot drop.   Coordination   Coordination no deficits were identified   Coordination Comments Pt reported no difficulty with BUE or RLE coordination   Muscle Tone   Muscle Tone no deficits were identified   Modality Interventions   Planned Modality Interventions TENS   General Therapy Interventions   Planned Therapy Interventions balance training;bed mobility training;gait training;groups;motor coordination training;neuromuscular re-education;ROM;strengthening;stretching;transfer training;risk factor education;home program guidelines;progressive activity/exercise   Clinical Impression   Criteria for Skilled Therapeutic Intervention yes, treatment indicated   PT Diagnosis LLE weakness, decreased activity tolerance, impaired balance, impaired transfers, STS, amb, and stair navigation   Influenced by the following impairments see clinical impression comments   Functional limitations due to impairments see clinical impression comments   Clinical Presentation Evolving/Changing   Clinical Presentation Rationale Multifocal and multisystem deficits with  moderate complexity in personal, body system, and comorbidities. Pt has good rehab potential.   Clinical Decision Making (Complexity) Moderate complexity   Therapy Frequency` daily   Predicted Duration of Therapy Intervention (days/wks) 14 days   Anticipated Equipment Needs at Discharge front wheeled walker;shower chair   Anticipated Discharge Disposition Home with Outpatient Therapy   Risk & Benefits of therapy have been explained Yes   Patient, Family & other staff in agreement with plan of care Yes   Clinical Impression Comments Pt below baseline for functional mobility. Pt presents with LLE weakness, decreased activity tolerance, impaired balance, impaired transfers, STS, amb, and stair navigation due to hip fracture and ORIF. Pt is able to STS with CGA, stand pivot transfer CGA, amb 15' with FWW CGA. Care plan is complicated by nonaccessible home, previous L foot drop, and other comorbidities. ELOS 14 days to achieve PT goals and be able to return home with OP PT.   Total Evaluation Time   Total Evaluation Time (Minutes) 30

## 2019-03-21 LAB — INR PPP: 1.92 (ref 0.86–1.14)

## 2019-03-21 PROCEDURE — 36415 COLL VENOUS BLD VENIPUNCTURE: CPT | Performed by: PHYSICAL MEDICINE & REHABILITATION

## 2019-03-21 PROCEDURE — 97116 GAIT TRAINING THERAPY: CPT | Mod: GP

## 2019-03-21 PROCEDURE — 97530 THERAPEUTIC ACTIVITIES: CPT | Mod: GP

## 2019-03-21 PROCEDURE — 25000132 ZZH RX MED GY IP 250 OP 250 PS 637: Performed by: PHYSICAL MEDICINE & REHABILITATION

## 2019-03-21 PROCEDURE — 12800006 ZZH R&B REHAB

## 2019-03-21 PROCEDURE — 85610 PROTHROMBIN TIME: CPT | Performed by: PHYSICAL MEDICINE & REHABILITATION

## 2019-03-21 PROCEDURE — 25000128 H RX IP 250 OP 636: Performed by: PHYSICAL MEDICINE & REHABILITATION

## 2019-03-21 PROCEDURE — 97110 THERAPEUTIC EXERCISES: CPT | Mod: GP

## 2019-03-21 PROCEDURE — 97535 SELF CARE MNGMENT TRAINING: CPT | Mod: GO | Performed by: STUDENT IN AN ORGANIZED HEALTH CARE EDUCATION/TRAINING PROGRAM

## 2019-03-21 RX ORDER — WARFARIN SODIUM 5 MG/1
5 TABLET ORAL
Status: COMPLETED | OUTPATIENT
Start: 2019-03-21 | End: 2019-03-21

## 2019-03-21 RX ADMIN — ENOXAPARIN SODIUM 60 MG: 60 INJECTION SUBCUTANEOUS at 10:32

## 2019-03-21 RX ADMIN — ASPIRIN 81 MG CHEWABLE TABLET 81 MG: 81 TABLET CHEWABLE at 08:28

## 2019-03-21 RX ADMIN — WARFARIN SODIUM 5 MG: 5 TABLET ORAL at 17:33

## 2019-03-21 RX ADMIN — ACETAMINOPHEN 975 MG: 325 TABLET, FILM COATED ORAL at 10:32

## 2019-03-21 RX ADMIN — ENOXAPARIN SODIUM 60 MG: 60 INJECTION SUBCUTANEOUS at 21:07

## 2019-03-21 RX ADMIN — ATORVASTATIN CALCIUM 40 MG: 40 TABLET, FILM COATED ORAL at 08:29

## 2019-03-21 RX ADMIN — ACETAMINOPHEN 975 MG: 325 TABLET, FILM COATED ORAL at 03:10

## 2019-03-21 RX ADMIN — DOXAZOSIN MESYLATE 1 MG: 1 TABLET ORAL at 08:29

## 2019-03-21 RX ADMIN — MIRTAZAPINE 15 MG: 7.5 TABLET ORAL at 21:22

## 2019-03-21 RX ADMIN — LIDOCAINE 1 PATCH: 560 PATCH PERCUTANEOUS; TOPICAL; TRANSDERMAL at 08:26

## 2019-03-21 RX ADMIN — CALCIUM 500 MG: 500 TABLET ORAL at 08:29

## 2019-03-21 RX ADMIN — CALCIUM 500 MG: 500 TABLET ORAL at 17:33

## 2019-03-21 RX ADMIN — SENNOSIDES AND DOCUSATE SODIUM 1 TABLET: 8.6; 5 TABLET ORAL at 21:07

## 2019-03-21 RX ADMIN — DOXAZOSIN MESYLATE 1 MG: 1 TABLET ORAL at 21:07

## 2019-03-21 RX ADMIN — METOPROLOL TARTRATE 25 MG: 25 TABLET, FILM COATED ORAL at 08:28

## 2019-03-21 RX ADMIN — METOPROLOL TARTRATE 25 MG: 25 TABLET, FILM COATED ORAL at 21:08

## 2019-03-21 RX ADMIN — ACETAMINOPHEN 975 MG: 325 TABLET, FILM COATED ORAL at 19:31

## 2019-03-21 RX ADMIN — VITAMIN D, TAB 1000IU (100/BT) 1000 UNITS: 25 TAB at 21:07

## 2019-03-21 RX ADMIN — SENNOSIDES AND DOCUSATE SODIUM 1 TABLET: 8.6; 5 TABLET ORAL at 08:28

## 2019-03-21 RX ADMIN — CYANOCOBALAMIN TAB 1000 MCG 1000 MCG: 1000 TAB at 08:28

## 2019-03-21 RX ADMIN — MULTIPLE VITAMINS W/ MINERALS TAB 1 TABLET: TAB at 08:28

## 2019-03-21 RX ADMIN — VITAMIN D, TAB 1000IU (100/BT) 1000 UNITS: 25 TAB at 08:28

## 2019-03-21 RX ADMIN — SERTRALINE HYDROCHLORIDE 100 MG: 100 TABLET ORAL at 21:07

## 2019-03-21 RX ADMIN — CYANOCOBALAMIN TAB 1000 MCG 1000 MCG: 1000 TAB at 21:07

## 2019-03-21 NOTE — PROGRESS NOTES
FOCUS/GOAL  Bladder management, Pain management, Mobility, Skin integrity and Safety management    ASSESSMENT, INTERVENTIONS AND CONTINUING PLAN FOR GOAL:  Randall due to left hip fx , stayed on bed this evening and was assisted with turning and repositioning on bed with 1 assist needs 2staff if he has to be boosted on  Bed , denies pain with scheduled Tylenol and that helps control the pain on the left hip , with serous drainage on the upper left hip incision moderate in amt wound care done and new dressing was applied , dressing was changed also on the lower incision no drainage noted on that area , pt is continent of bladder and he used the urinal with set up , LBM was to day , pt has a lot of edema on BLE which is more on the the left , with tubi  on both legs which was taken off at bedtime and both legs elevated on pillows.  Continue to monitor incision for s/s 9of infection , continue to monitor pain and assess the effectiveness of bowel management , continue to assist pt with safe transfer in room and assist him to met his highest level of independence .

## 2019-03-21 NOTE — PROGRESS NOTES
"  [facility]   Acute Rehabilitation Unit  Daily progress note    interval history  Noe Florence was seen and examined at bedside. Didn't sleep well last night; couldn't get comfortable. No pain or other symptoms. Wounds have been draining clear fluid without other signs like erythema, increased pain or swelling. No fever or chills. Discussed with nursing staff and changed the dressing order.     INR subtherapeutic; will continue lovenox.     Eval completed. ELOS is 14 days.         medications  Scheduled meds    acetaminophen  975 mg Oral Q8H     aspirin  81 mg Oral Daily     atorvastatin  40 mg Oral Daily     calcium carbonate 500 mg (elemental)  500 mg Oral BID w/meals     cyanocobalamin  1,000 mcg Oral BID     doxazosin  1 mg Oral BID     enoxaparin  60 mg Subcutaneous Q12H     lidocaine  1-2 patch Transdermal Q24H     lidocaine   Transdermal Q8H     lidocaine   Transdermal Q24h     metoprolol tartrate  25 mg Oral BID     mirtazapine  15 mg Oral At Bedtime     multivitamin w/minerals  1 tablet Oral Daily     polyethylene glycol  17 g Oral Daily     psyllium  1 packet Oral Daily     senna-docusate  1-2 tablet Oral BID     sertraline  100 mg Oral QPM     vitamin D3  1,000 Units Oral BID       PRN meds:  bisacodyl, methocarbamol, naloxone, oxyCODONE, - MEDICATION INSTRUCTIONS -, Warfarin Therapy Reminder      physical exam  /46   Pulse 62   Temp 96.9  F (36.1  C) (Oral)   Resp 16   Ht 1.702 m (5' 7\")   Wt 69 kg (152 lb 1.6 oz)   SpO2 98%   BMI 23.82 kg/m    Gen: NAD, resting in bed   Pulm: non-labored in room air   Abd: soft   Ext: + edema in LLE, incisions with dressing in place, L upper one saturated with clear fluid  Neuro/MSK: oriented x4, LLE strength limited by pain, chronic L foot drop     labs  INR 1.85    assessment and plan  Noe Florence is a 83 year old right hand dominant male who was admitted on 3//12 after a fall resulting in L intertrochanteric " femoral fracture s/p ORIF on 3/14. 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      Admission to acute inpatient rehab 03/19/19.    Impairment group code: 08.11        1. PT, and OT 90 minutes of each on a daily basis, in addition to rehab nursing and close management of physiatrist.       2. Impairment of ADL's: OT for 90 min daily to work on upper and lower body self care, dressing, toileting, bathing, energy conservation techniques with use of ADs as needed.      3. Impairment of mobility:  PT for 90 min daily to work on strengthening, endurance buildup, transfers with use of walker as needed.      4. Rehab RN to administer medication, patient education on medication taking, VS monitoring, bowel regimen, and wound care/surgical wound dressing changes and monitoring.      1. Medical Conditions  # L intertrochanteric femoral fracture s/p ORIF on 3/14  --WBAT  --incision open to air; will remove staples at 2 week post-op  --pain control by scheduled tylenol, lidoderm, prn oxycodone and robaxin  --should f/u with ortho team as outpatient       # Anemia: acute blood loss on chronic (ACD). Stable. Will transfuse if hgb < 7.      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 Noted that he didn't know that the DVT has not resolved. US on 3/18 showed possible acute on chronic DVT.          # Afib s/p ablation and on coumadin, VT s/p ICD placement, CAD s/p CABG x2: followed by cardiology and EP teams as outpatient. Continue metoprolol, ASA 81 and coumadin  (lovenox bridge).         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 Duarte as outpatient; last f/u in March 2019. Stable per notes.         # HTN: continue cardura, and metoprolol.      # HLD: continue lipitor.      # CKD3: stable.         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 Should f/u with oncology team as outpatient.      # H/o depression: well controlled on current regimen. Continue sertraline and remeron.      2. Adjustment to disability:  Seem to be coping well. Clinical psychology to eval and treat if needed.   3. FEN: regular diet  4. Bowel: constipation resolved. Continue scheduled and prn bowel meds.   5. Bladder: continent; will check PVRs.   6. DVT Prophylaxis: on warfarin with lovenox bridge.   7. GI Prophylaxis: none  8. Code: full - confirmed on admission to ARU  9. Disposition: home  10. ELOS:  10-14 days        Moon Irizarry MD  Physical Medicine & Rehabilitation      I spent a total of 30 minutes face-to-face or managing the care of Noe Florence. Over 50% of my time on the unit was spent counseling the patient and coordinating care. See note for details.

## 2019-03-21 NOTE — PROGRESS NOTES
Patient A&O, denied CP, lightheadedness, dizziness, numbness, tingling and SOB. Drinking well, urinal at bedside and voiding okay, LBM yesterday, able to wiggle toes, knee dressing changed, left lower flank area dressing CDI, CMS intact, no complain of pain, incentive spirometer encouraged, upright in chair, heels elevated off bed, able to use call light appropriately and make needs known, will continue to monitor patient as POC.

## 2019-03-21 NOTE — PLAN OF CARE
Acute Rehab Care Conference/Team Rounds      Type: Team Rounds    Present: Dr Moon Irizarry, Stefania Ramires LICSW, Maria Del Carmen Dougherty OT, Kendra Anders PT, Deepti Francois SLP, Nasima MATTHEWS, Babita Tolliver Dietician, Malinda Villarreal , Miriam Oshea        Discharge Barriers/Treatment/Education    Rehab Diagnosis: fall and hip fracture     Active Medical Co-morbidities/Prognosis: anemia, A fib, CAD/CABG, DVT in LLE, HTN, HLD, CKD, h/o colon cancer and depression     Safety: Bed alarm on, bed in low position, call light within reach    Pain: No c/o pain, only has pain in left leg/hip when repositioning    Medications, Skin, Tubes/Lines: Scheduled Tylenol given, bruising on left hip and leg near incisions    Swallowing/Nutrition: on regular diet     Bowel/Bladder: Continent of bowel/bladder, uses urinal at bedside, no BM on night shift    Psychosocial:  Pt is a 83 year old male admitted to ARU on 3/19/19 for rehabilitation following a hip fracture from falling on the ice.  Pt lives in [place] with his significant other, [place] and [place]  Pt reported that he enjoys going out to shows, [place] orchestra and films.  Pt goal is to return home and live independently again, is open to home care as he has used them previously following surgery.  SW will continue to follow through discharge.     ADLs/IADLs: Pt is set up assist for UB cares and MAX A for LB cares. BRANDON for transfer with walker. Pt having significant pain this AM with bed mobility. Early in his stay, pt has a shower chair and GB already. Need top progress to MOD I and stairs are environmental barrier. Continue with POC.     Mobility: Pt below baseline for functional mobility. Pt presents with LLE weakness, decreased activity tolerance, impaired balance, impaired transfers, STS, amb, and stair navigation due to hip fracture and ORIF. Pt is able to STS with CGA, stand pivot transfer CGA, amb 15' with FWW CGA.  Care plan is complicated by nonaccessible home, previous L foot drop, and other comorbidities. ELOS 14 days to achieve PT goals and be able to return home with OP PT.    Cognition/Language: wnl    Community Re-Entry: SO is very supportive     Transportation: no driving     Decision maker: self    Plan of Care and goals reviewed and updated.    Discharge Plan/Recommendations    Fall Precautions: continue    Overall plan for the patient: early in his rehab course; ELOS is 14 days. Pain, weakness and medical co-morbidities (anemia, cardiac disease, DVT, deconditioning due to recent surgery for colon cancer and prior nerve injury at [facility]) are his main barriers for independence. Stairs are also a bid barrier for discharge. Will continue current plan of care and discuss again next week.      Utilization Review and Continued Stay Justification    Medical Necessity Criteria:    For any criteria that is not met, please document reason and plan for discharge, transfer, or modification of plan of care to address.    Requires intensive rehabilitation program to treat functional deficits?: Yes    Requires 3x per week or greater involvement of rehabilitation physician to oversee rehabilitation program?: Yes    Requires rehabilitation nursing interventions?: Yes    Patient is making functional progress?: Yes    There is a potential for additional functional progress? Yes    Patient is participating in therapy 3 hours per day a minimum of 5 days per week or 15 hours per week in 7 day period?:Yes    Has discharge needs that require coordinated discharge planning approach?:Yes      Final Physician Sign off    Statement of Approval: I agree with all the recommendations detailed in this document.      Patient Goals    OT goal: hygiene/grooming: modified independent, while standing, using adaptive equipment  OT goal: upper body dressing: Modified independent, including set-up/clothing retrieval  OT goal: lower body dressing: Modified  independent, including set-up/clothing retrieval, using adaptive equipment  OT goal: upper body bathing: using adaptive equipment, Supervision/stand-by assist  OT goal: lower body bathing: Supervision/stand-by assist, using adaptive equipment  OT goal: toilet transfer/toileting: Modified independent, cleaning and garment management, toilet transfer, using adaptive equipment  OT goal: meal preparation: Modified independent, with simple meal preparation, ambulatory level, using adaptive equipment  OT goal: home management: Modified independent, with light demand household tasks, ambulatory level, using adaptive equipment  OT goal 1: Pt will demo IND with BUE HEP to increase UB strengthening needed for ADLs and functional mobility.  OT goal 2: Pt will demo SBA with shower transfer utilizing appropriate AE/DME.  OT/BUCKY face-to-face collaboration occurred, patient progress and plan of care reviewed.      PT Frequency: 90 min daily  PT goal: bed mobility: Independent, Supine to/from sit, Bridging  PT goal: transfers: Modified independent, Assistive device(FWW)  PT goal: gait: 100 feet, Rolling walker, Modified independent  PT goal: stairs: Modified independent, Greater than 10 stairs, Rail on both sides  PT goal 1: Pt will perform 3 stairs with 1 railing per home setup.  PT Goal 2: Pt will be educated on falls prevention at home, and be able to verbalize 3 fall prevention strategies.  PT Goal 3: Pt will be able to perform car transfer Mod I with FWW.  PT Goal 4: Pt will be IND with HEP for BLE strengthening and balance.     Nursing goals  Patient Goal:  Pain Management: Pt will demonstrate adequate pain management demonstrated by requesting pain interventions when needed prior to being offered by nursing.    Goal: Wound Management: Pt will demonstrate adequate wound care knowledge as evidenced by noting any signs of infection and wound care interventions prior to time of discharge.       Goal: Mobility: Pt will  demonstrate adequate ambulation ability evidenced by no falls and independent with or without walking device prior to time of discharge.

## 2019-03-22 ENCOUNTER — ANESTHESIA EVENT (OUTPATIENT)
Dept: REHABILITATION | Facility: CLINIC | Age: 84
End: 2019-03-22

## 2019-03-22 ENCOUNTER — ANESTHESIA (OUTPATIENT)
Dept: REHABILITATION | Facility: CLINIC | Age: 84
End: 2019-03-22

## 2019-03-22 LAB
ABO + RH BLD: NORMAL
ABO + RH BLD: NORMAL
ANION GAP SERPL CALCULATED.3IONS-SCNC: 8 MMOL/L (ref 3–14)
BLD GP AB SCN SERPL QL: NORMAL
BLD PROD TYP BPU: NORMAL
BLD PROD TYP BPU: NORMAL
BLD UNIT ID BPU: 0
BLOOD BANK CMNT PATIENT-IMP: NORMAL
BLOOD PRODUCT CODE: NORMAL
BPU ID: NORMAL
BUN SERPL-MCNC: 43 MG/DL (ref 7–30)
CALCIUM SERPL-MCNC: 7.9 MG/DL (ref 8.5–10.1)
CHLORIDE SERPL-SCNC: 112 MMOL/L (ref 94–109)
CO2 SERPL-SCNC: 24 MMOL/L (ref 20–32)
CREAT SERPL-MCNC: 1.21 MG/DL (ref 0.66–1.25)
ERYTHROCYTE [DISTWIDTH] IN BLOOD BY AUTOMATED COUNT: 17.1 % (ref 10–15)
GFR SERPL CREATININE-BSD FRML MDRD: 55 ML/MIN/{1.73_M2}
GLUCOSE SERPL-MCNC: 82 MG/DL (ref 70–99)
HCT VFR BLD AUTO: 21.9 % (ref 40–53)
HGB BLD-MCNC: 6.8 G/DL (ref 13.3–17.7)
INR PPP: 2.19 (ref 0.86–1.14)
MCH RBC QN AUTO: 28.1 PG (ref 26.5–33)
MCHC RBC AUTO-ENTMCNC: 31.1 G/DL (ref 31.5–36.5)
MCV RBC AUTO: 91 FL (ref 78–100)
NUM BPU REQUESTED: 1
PLATELET # BLD AUTO: 242 10E9/L (ref 150–450)
POTASSIUM SERPL-SCNC: 4.5 MMOL/L (ref 3.4–5.3)
RBC # BLD AUTO: 2.42 10E12/L (ref 4.4–5.9)
SODIUM SERPL-SCNC: 144 MMOL/L (ref 133–144)
SPECIMEN EXP DATE BLD: NORMAL
TRANSFUSION STATUS PATIENT QL: NORMAL
TRANSFUSION STATUS PATIENT QL: NORMAL
WBC # BLD AUTO: 5.9 10E9/L (ref 4–11)

## 2019-03-22 PROCEDURE — 12800006 ZZH R&B REHAB

## 2019-03-22 PROCEDURE — 86850 RBC ANTIBODY SCREEN: CPT | Performed by: PHYSICAL MEDICINE & REHABILITATION

## 2019-03-22 PROCEDURE — 97110 THERAPEUTIC EXERCISES: CPT | Mod: GP | Performed by: STUDENT IN AN ORGANIZED HEALTH CARE EDUCATION/TRAINING PROGRAM

## 2019-03-22 PROCEDURE — 86923 COMPATIBILITY TEST ELECTRIC: CPT | Performed by: PHYSICAL MEDICINE & REHABILITATION

## 2019-03-22 PROCEDURE — 85610 PROTHROMBIN TIME: CPT | Performed by: PHYSICAL MEDICINE & REHABILITATION

## 2019-03-22 PROCEDURE — 86901 BLOOD TYPING SEROLOGIC RH(D): CPT | Performed by: PHYSICAL MEDICINE & REHABILITATION

## 2019-03-22 PROCEDURE — 97110 THERAPEUTIC EXERCISES: CPT | Mod: GO | Performed by: OCCUPATIONAL THERAPIST

## 2019-03-22 PROCEDURE — 97530 THERAPEUTIC ACTIVITIES: CPT | Mod: GP | Performed by: STUDENT IN AN ORGANIZED HEALTH CARE EDUCATION/TRAINING PROGRAM

## 2019-03-22 PROCEDURE — 85027 COMPLETE CBC AUTOMATED: CPT | Performed by: PHYSICAL MEDICINE & REHABILITATION

## 2019-03-22 PROCEDURE — 25000132 ZZH RX MED GY IP 250 OP 250 PS 637: Performed by: PHYSICAL MEDICINE & REHABILITATION

## 2019-03-22 PROCEDURE — 25800030 ZZH RX IP 258 OP 636

## 2019-03-22 PROCEDURE — 36415 COLL VENOUS BLD VENIPUNCTURE: CPT | Performed by: PHYSICAL MEDICINE & REHABILITATION

## 2019-03-22 PROCEDURE — 80048 BASIC METABOLIC PNL TOTAL CA: CPT | Performed by: PHYSICAL MEDICINE & REHABILITATION

## 2019-03-22 PROCEDURE — 40000671 ZZH STATISTIC ANESTHESIA CASE

## 2019-03-22 PROCEDURE — 86900 BLOOD TYPING SEROLOGIC ABO: CPT | Performed by: PHYSICAL MEDICINE & REHABILITATION

## 2019-03-22 PROCEDURE — 97535 SELF CARE MNGMENT TRAINING: CPT | Mod: GO

## 2019-03-22 PROCEDURE — P9016 RBC LEUKOCYTES REDUCED: HCPCS | Performed by: PHYSICAL MEDICINE & REHABILITATION

## 2019-03-22 RX ORDER — WARFARIN SODIUM 5 MG/1
5 TABLET ORAL
Status: COMPLETED | OUTPATIENT
Start: 2019-03-22 | End: 2019-03-22

## 2019-03-22 RX ORDER — SODIUM CHLORIDE 9 MG/ML
INJECTION, SOLUTION INTRAVENOUS
Status: COMPLETED
Start: 2019-03-22 | End: 2019-03-22

## 2019-03-22 RX ADMIN — PSYLLIUM HUSK 1 PACKET: 3.4 POWDER ORAL at 09:19

## 2019-03-22 RX ADMIN — CALCIUM 500 MG: 500 TABLET ORAL at 09:18

## 2019-03-22 RX ADMIN — VITAMIN D, TAB 1000IU (100/BT) 1000 UNITS: 25 TAB at 21:48

## 2019-03-22 RX ADMIN — VITAMIN D, TAB 1000IU (100/BT) 1000 UNITS: 25 TAB at 09:18

## 2019-03-22 RX ADMIN — ACETAMINOPHEN 975 MG: 325 TABLET, FILM COATED ORAL at 12:37

## 2019-03-22 RX ADMIN — POLYETHYLENE GLYCOL 3350 17 G: 17 POWDER, FOR SOLUTION ORAL at 09:19

## 2019-03-22 RX ADMIN — SERTRALINE HYDROCHLORIDE 100 MG: 100 TABLET ORAL at 21:50

## 2019-03-22 RX ADMIN — MULTIPLE VITAMINS W/ MINERALS TAB 1 TABLET: TAB at 09:18

## 2019-03-22 RX ADMIN — CYANOCOBALAMIN TAB 1000 MCG 1000 MCG: 1000 TAB at 21:49

## 2019-03-22 RX ADMIN — METOPROLOL TARTRATE 25 MG: 25 TABLET, FILM COATED ORAL at 21:52

## 2019-03-22 RX ADMIN — ACETAMINOPHEN 975 MG: 325 TABLET, FILM COATED ORAL at 18:54

## 2019-03-22 RX ADMIN — ACETAMINOPHEN 975 MG: 325 TABLET, FILM COATED ORAL at 04:03

## 2019-03-22 RX ADMIN — ATORVASTATIN CALCIUM 40 MG: 40 TABLET, FILM COATED ORAL at 09:18

## 2019-03-22 RX ADMIN — Medication 2.5 MG: at 08:34

## 2019-03-22 RX ADMIN — ASPIRIN 81 MG CHEWABLE TABLET 81 MG: 81 TABLET CHEWABLE at 09:19

## 2019-03-22 RX ADMIN — SENNOSIDES AND DOCUSATE SODIUM 1 TABLET: 8.6; 5 TABLET ORAL at 09:18

## 2019-03-22 RX ADMIN — DOXAZOSIN MESYLATE 1 MG: 1 TABLET ORAL at 09:18

## 2019-03-22 RX ADMIN — SODIUM CHLORIDE: 9 INJECTION, SOLUTION INTRAVENOUS at 11:45

## 2019-03-22 RX ADMIN — CYANOCOBALAMIN TAB 1000 MCG 1000 MCG: 1000 TAB at 09:19

## 2019-03-22 RX ADMIN — DOXAZOSIN MESYLATE 1 MG: 1 TABLET ORAL at 21:52

## 2019-03-22 RX ADMIN — METOPROLOL TARTRATE 25 MG: 25 TABLET, FILM COATED ORAL at 09:18

## 2019-03-22 RX ADMIN — LIDOCAINE 1 PATCH: 560 PATCH PERCUTANEOUS; TOPICAL; TRANSDERMAL at 09:19

## 2019-03-22 RX ADMIN — MIRTAZAPINE 15 MG: 7.5 TABLET ORAL at 21:50

## 2019-03-22 RX ADMIN — CALCIUM 500 MG: 500 TABLET ORAL at 18:54

## 2019-03-22 RX ADMIN — WARFARIN SODIUM 5 MG: 5 TABLET ORAL at 18:54

## 2019-03-22 NOTE — PLAN OF CARE
FOCUS/GOAL  Bowel management, Bladder management, Pain management and Medical management    ASSESSMENT, INTERVENTIONS AND CONTINUING PLAN FOR GOAL:         Patient is A&Ox4, continent of bowel/bladder. Patient slept most of the night and voided 400 ml using a urinal at bedside. No c/o pain, scheduled Tylenol given. No BM this shift. Left hip/leg dressings CDI. Continue POC.

## 2019-03-22 NOTE — PLAN OF CARE
Patient declined need for PRN pain medication. Took scheduled tylenol.    Incisions are healing, staples intact, dressings changed.  Transferred with min assist of one, belt and walker from wheelchair to toilet and wheelchair to bed.  Needs verbal cues for where to place hands.  Attempted to void in urinal while sitting in the wheelchair but urine spilled causing an accident.  Voiding improved while standing and staff held the urinal. Continent of BM on the toilet.  Needs total assist with clothing management and thelma care.

## 2019-03-22 NOTE — PROGRESS NOTES
"  [facility]   Acute Rehabilitation Unit  Daily progress note    interval history  Noe Florence was seen briefly in his room. Doing well. Participating in therapies. Slept better last night. Pain is well controlled. Not taking oxycodone.     INR 2.19; stopped lovenox.   Hgb 6.8; no s/s of bleeding. Received 1U of PRBC. Recheck in am.    CGA for sit<>stand, MIN A for chair>bed transfer, ~5' total, needs vcs for WB through LLE  Ordered Rooke boot for better positioning of LLE        medications  Scheduled meds    acetaminophen  975 mg Oral Q8H     aspirin  81 mg Oral Daily     atorvastatin  40 mg Oral Daily     calcium carbonate 500 mg (elemental)  500 mg Oral BID w/meals     cyanocobalamin  1,000 mcg Oral BID     doxazosin  1 mg Oral BID     lidocaine  1-2 patch Transdermal Q24H     lidocaine   Transdermal Q8H     lidocaine   Transdermal Q24h     metoprolol tartrate  25 mg Oral BID     mirtazapine  15 mg Oral At Bedtime     multivitamin w/minerals  1 tablet Oral Daily     polyethylene glycol  17 g Oral Daily     psyllium  1 packet Oral Daily     senna-docusate  1-2 tablet Oral BID     sertraline  100 mg Oral QPM     sodium chloride (PF)  3 mL Intracatheter Q8H     vitamin D3  1,000 Units Oral BID     warfarin  5 mg Oral ONCE at 18:00       PRN meds:  sodium chloride 0.9%, bisacodyl, methocarbamol, naloxone, oxyCODONE, - MEDICATION INSTRUCTIONS -, sodium chloride (PF), Warfarin Therapy Reminder      physical exam  /52 (BP Location: Left arm)   Pulse 66   Temp 97  F (36.1  C) (Oral)   Resp 16   Ht 1.702 m (5' 7\")   Wt 69 kg (152 lb 1.6 oz)   SpO2 95%   BMI 23.82 kg/m      Gen: NAD, resting in bed   Pulm: non-labored in room air   Abd: soft   Ext: + edema in LLE, incisions x3 healing well with staples in place. Dressing was dry on the proximal and distal one, some dried yellowish drainage on the dressing for middle incision - lateral thigh   Neuro/MSK: oriented x4, " LLE strength limited by pain, chronic L foot drop     labs  Reviewed     assessment and plan  Noe Florence is a 83 year old right hand dominant male who was admitted on 3//12 after a fall resulting in L intertrochanteric femoral fracture s/p ORIF on 3/14. 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      Admission to acute inpatient rehab 03/19/19.    Impairment group code: 08.11        Rehabilitation - continue comprehensive acute inpatient rehabilitation program with multidisciplinary approach including therapies, rehab nursing, and physiatry following. See interval history for updates.        Medical Conditions  # L intertrochanteric femoral fracture s/p ORIF on 3/14  --WBAT  --incision open to air; will remove staples at 2 week post-op  --pain control by scheduled tylenol, lidoderm, prn oxycodone and robaxin  --should f/u with ortho team as outpatient       # Anemia: acute blood loss on chronic (ACD); received 3 units of PRBC. Will transfuse if hgb < 7. 03/22/19 Hgb 6.8; no s/s of bleeding. Received 1U of PRBC. Recheck in am.     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 Noted that he didn't know that the DVT has not resolved. US on 3/18 showed possible acute on chronic DVT. 03/21/19 added compression socks to help with swelling.          # Afib s/p ablation and on coumadin, VT s/p ICD placement, CAD s/p CABG x2: followed by cardiology and EP teams as outpatient. Continue metoprolol, ASA 81 and coumadin (lovenox bridge).         # Chronic L  foot drop, length dependant polyneuropathy, probable L4-S1 radiculopathies and a left peroneal neuropathy: is followed by Dr. Duarte as outpatient; last f/u in March 2019. Stable per notes.         # HTN: continue cardura, and metoprolol.      # HLD: continue lipitor.      # CKD3: stable.         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 Should f/u with oncology team as outpatient.      # H/o depression: well controlled on current regimen. Continue sertraline and remeron.      2. Adjustment to disability:  Seem to be coping well. Clinical psychology to eval and treat if needed.   3. FEN: regular diet  4. Bowel: constipation resolved. Continue scheduled and prn bowel meds.   5. Bladder: continent; PVRs 0 x2.   6. DVT Prophylaxis: on warfarin with lovenox bridge.   7. GI Prophylaxis: none  8. Code: full - confirmed on admission to ARU  9. Disposition: home  10. ELOS:  14 days        Moon Irizarry MD  Physical Medicine & Rehabilitation

## 2019-03-22 NOTE — PROVIDER NOTIFICATION
0671 Critical lab value of Hemoglobin 6.8 received by lab. Paged first call Dr. Miranda [phone] with results.

## 2019-03-22 NOTE — PROGRESS NOTES
"  [facility]   Acute Rehabilitation Unit  Daily progress note    interval history  Noe Florence was seen briefly in his room. Doing well. Participating in therapies. Slept better last night. Pain is well controlled. Not taking oxycodone.     INR 1.92; continue lovenox.     Team rounds held today; please see separate document in Plan of Care tab for full details. Briefly, early in his rehab course; ELOS is 14 days. Pain, weakness and medical co-morbidities (anemia, cardiac disease, DVT, deconditioning due to recent surgery for colon cancer and prior nerve injury at [facility]) are his main barriers for independence. Stairs are also a bid barrier for discharge. Will continue current plan of care and discuss again next week.      medications  Scheduled meds    acetaminophen  975 mg Oral Q8H     aspirin  81 mg Oral Daily     atorvastatin  40 mg Oral Daily     calcium carbonate 500 mg (elemental)  500 mg Oral BID w/meals     cyanocobalamin  1,000 mcg Oral BID     doxazosin  1 mg Oral BID     enoxaparin  60 mg Subcutaneous Q12H     lidocaine  1-2 patch Transdermal Q24H     lidocaine   Transdermal Q8H     lidocaine   Transdermal Q24h     metoprolol tartrate  25 mg Oral BID     mirtazapine  15 mg Oral At Bedtime     multivitamin w/minerals  1 tablet Oral Daily     polyethylene glycol  17 g Oral Daily     psyllium  1 packet Oral Daily     senna-docusate  1-2 tablet Oral BID     sertraline  100 mg Oral QPM     vitamin D3  1,000 Units Oral BID       PRN meds:  bisacodyl, methocarbamol, naloxone, oxyCODONE, - MEDICATION INSTRUCTIONS -, Warfarin Therapy Reminder      physical exam  /53 (BP Location: Left arm)   Pulse 60   Temp 96.1  F (35.6  C) (Oral)   Resp 16   Ht 1.702 m (5' 7\")   Wt 69 kg (152 lb 1.6 oz)   SpO2 98%   BMI 23.82 kg/m      Gen: NAD, sitting in chair   Pulm: non-labored in room air   Abd: soft   Ext: + edema in LLE, incisions not examined today   Neuro/MSK: oriented " x4, LLE strength limited by pain, chronic L foot drop     labs  Reviewed     assessment and plan  Noe Florence is a 83 year old right hand dominant male who was admitted on 3//12 after a fall resulting in L intertrochanteric femoral fracture s/p ORIF on 3/14. 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      Admission to acute inpatient rehab 03/19/19.    Impairment group code: 08.11        Rehabilitation - continue comprehensive acute inpatient rehabilitation program with multidisciplinary approach including therapies, rehab nursing, and physiatry following. See interval history for updates.        Medical Conditions  # L intertrochanteric femoral fracture s/p ORIF on 3/14  --WBAT  --incision open to air; will remove staples at 2 week post-op  --pain control by scheduled tylenol, lidoderm, prn oxycodone and robaxin  --should f/u with ortho team as outpatient       # Anemia: acute blood loss on chronic (ACD). Stable. Will transfuse if hgb < 7.      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 Noted that he didn't know that the DVT has not resolved. US on 3/18 showed possible acute on chronic DVT. 03/21/19 added compression socks to help with swelling.          # Afib s/p ablation and on coumadin, VT s/p ICD placement, CAD s/p CABG x2: followed by cardiology and EP teams as outpatient. Continue metoprolol, ASA 81 and coumadin (lovenox bridge).         # Chronic L foot drop, length dependant polyneuropathy, probable L4-S1 radiculopathies and a left  peroneal neuropathy: is followed by Dr. Duarte as outpatient; last f/u in March 2019. Stable per notes.         # HTN: continue cardura, and metoprolol.      # HLD: continue lipitor.      # CKD3: stable.         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 Should f/u with oncology team as outpatient.      # H/o depression: well controlled on current regimen. Continue sertraline and remeron.      2. Adjustment to disability:  Seem to be coping well. Clinical psychology to eval and treat if needed.   3. FEN: regular diet  4. Bowel: constipation resolved. Continue scheduled and prn bowel meds.   5. Bladder: continent; PVRs 0 x2.   6. DVT Prophylaxis: on warfarin with lovenox bridge.   7. GI Prophylaxis: none  8. Code: full - confirmed on admission to ARU  9. Disposition: home  10. ELOS:  14 days        Moon Irizarry MD  Physical Medicine & Rehabilitation

## 2019-03-22 NOTE — PLAN OF CARE
Session limited this am 2/2 fatigue, low Hgb and awaiting transfusion which occurred during scheduled PM session. Missed 85 minutes total.   CGA for sit<>stand, MIN A for chair>bed transfer, ~5' total, needs vcs for WB through LLE, pt remains flexed at hips and L knee, writer suspects leg length descrepency.

## 2019-03-22 NOTE — ANESTHESIA CARE TRANSFER NOTE
Patient: Noe Florence    * No procedures listed *    Diagnosis: * No pre-op diagnosis entered *  Diagnosis Additional Information: No value filed.    Anesthesia Type:   No value filed.     Note:  Anesthesia Care Transfer Notewriter    Vitals: (Last set prior to Anesthesia Care Transfer)      Anesthesia CRNA Procedure    Patient:Noe Florence ([id number])  Site: [place]  Procedure:iv start   Diagnosis: need PRBCs   Surgeon/Doctor: Edie  CRNA: Cheri Irvin CRNA 3/22/2019 11:04 AM          Electronically Signed By: MARIA LUISA Rabago CRNA  March 22, 2019  11:04 AM

## 2019-03-22 NOTE — PLAN OF CARE
FOCUS/GOAL  Bowel management, Bladder management, Medication management, Wound care management and Mobility    ASSESSMENT, INTERVENTIONS AND CONTINUING PLAN FOR GOAL:  Patient is A&O x4. Makes needs known. Assist of 1 w/ walker. Continent of bowel and bladder. Complained of L hip pain x1 and was relieved w/ PRN Oxy. Dressings WDL except for drainage on L leg. Patient had a Hgb of 6.8 this morning, consent was verified via PA and blood transfusion began. RBCs were given and patient's vitals were stable during transfusion and no reaction was noted. PIV in R arm is patent and saline locked. Currently patient is resting well after transfusion. Continue with plan of care.

## 2019-03-23 LAB
ALBUMIN UR-MCNC: NEGATIVE MG/DL
APPEARANCE UR: CLEAR
BACTERIA #/AREA URNS HPF: ABNORMAL /HPF
BILIRUB UR QL STRIP: NEGATIVE
COLOR UR AUTO: YELLOW
GLUCOSE UR STRIP-MCNC: NEGATIVE MG/DL
HGB BLD-MCNC: 8.6 G/DL (ref 13.3–17.7)
HGB UR QL STRIP: NEGATIVE
HYALINE CASTS #/AREA URNS LPF: 1 /LPF (ref 0–2)
INR PPP: 2.08 (ref 0.86–1.14)
KETONES UR STRIP-MCNC: NEGATIVE MG/DL
LEUKOCYTE ESTERASE UR QL STRIP: NEGATIVE
MUCOUS THREADS #/AREA URNS LPF: PRESENT /LPF
NITRATE UR QL: NEGATIVE
PH UR STRIP: 6.5 PH (ref 5–7)
RBC #/AREA URNS AUTO: 1 /HPF (ref 0–2)
SOURCE: ABNORMAL
SP GR UR STRIP: 1.02 (ref 1–1.03)
UROBILINOGEN UR STRIP-MCNC: NORMAL MG/DL (ref 0–2)
WBC #/AREA URNS AUTO: 1 /HPF (ref 0–5)

## 2019-03-23 PROCEDURE — 12800006 ZZH R&B REHAB

## 2019-03-23 PROCEDURE — 40000133 ZZH STATISTIC OT WARD VISIT

## 2019-03-23 PROCEDURE — 97530 THERAPEUTIC ACTIVITIES: CPT | Mod: GP

## 2019-03-23 PROCEDURE — 36415 COLL VENOUS BLD VENIPUNCTURE: CPT | Performed by: PHYSICAL MEDICINE & REHABILITATION

## 2019-03-23 PROCEDURE — 97535 SELF CARE MNGMENT TRAINING: CPT | Mod: GO

## 2019-03-23 PROCEDURE — 25000132 ZZH RX MED GY IP 250 OP 250 PS 637: Performed by: PHYSICAL MEDICINE & REHABILITATION

## 2019-03-23 PROCEDURE — 85610 PROTHROMBIN TIME: CPT | Performed by: PHYSICAL MEDICINE & REHABILITATION

## 2019-03-23 PROCEDURE — 85018 HEMOGLOBIN: CPT | Performed by: PHYSICAL MEDICINE & REHABILITATION

## 2019-03-23 PROCEDURE — 81001 URINALYSIS AUTO W/SCOPE: CPT | Performed by: PHYSICAL MEDICINE & REHABILITATION

## 2019-03-23 PROCEDURE — 97110 THERAPEUTIC EXERCISES: CPT | Mod: GP

## 2019-03-23 PROCEDURE — 97116 GAIT TRAINING THERAPY: CPT | Mod: GP

## 2019-03-23 PROCEDURE — 97110 THERAPEUTIC EXERCISES: CPT | Mod: GO | Performed by: OCCUPATIONAL THERAPIST

## 2019-03-23 PROCEDURE — 97110 THERAPEUTIC EXERCISES: CPT | Mod: GO

## 2019-03-23 RX ORDER — WARFARIN SODIUM 3 MG/1
6 TABLET ORAL
Status: COMPLETED | OUTPATIENT
Start: 2019-03-23 | End: 2019-03-23

## 2019-03-23 RX ADMIN — DOXAZOSIN MESYLATE 1 MG: 1 TABLET ORAL at 20:36

## 2019-03-23 RX ADMIN — VITAMIN D, TAB 1000IU (100/BT) 1000 UNITS: 25 TAB at 20:35

## 2019-03-23 RX ADMIN — CYANOCOBALAMIN TAB 1000 MCG 1000 MCG: 1000 TAB at 09:14

## 2019-03-23 RX ADMIN — ACETAMINOPHEN 975 MG: 325 TABLET, FILM COATED ORAL at 12:01

## 2019-03-23 RX ADMIN — LIDOCAINE 1 PATCH: 560 PATCH PERCUTANEOUS; TOPICAL; TRANSDERMAL at 09:05

## 2019-03-23 RX ADMIN — SENNOSIDES AND DOCUSATE SODIUM 2 TABLET: 8.6; 5 TABLET ORAL at 20:35

## 2019-03-23 RX ADMIN — SERTRALINE HYDROCHLORIDE 100 MG: 100 TABLET ORAL at 20:36

## 2019-03-23 RX ADMIN — ACETAMINOPHEN 975 MG: 325 TABLET, FILM COATED ORAL at 20:35

## 2019-03-23 RX ADMIN — VITAMIN D, TAB 1000IU (100/BT) 1000 UNITS: 25 TAB at 09:14

## 2019-03-23 RX ADMIN — CALCIUM 500 MG: 500 TABLET ORAL at 17:20

## 2019-03-23 RX ADMIN — MULTIPLE VITAMINS W/ MINERALS TAB 1 TABLET: TAB at 09:04

## 2019-03-23 RX ADMIN — SENNOSIDES AND DOCUSATE SODIUM 1 TABLET: 8.6; 5 TABLET ORAL at 09:14

## 2019-03-23 RX ADMIN — WARFARIN SODIUM 6 MG: 3 TABLET ORAL at 17:20

## 2019-03-23 RX ADMIN — PSYLLIUM HUSK 1 PACKET: 3.4 POWDER ORAL at 09:04

## 2019-03-23 RX ADMIN — CYANOCOBALAMIN TAB 1000 MCG 1000 MCG: 1000 TAB at 20:36

## 2019-03-23 RX ADMIN — CALCIUM 500 MG: 500 TABLET ORAL at 09:04

## 2019-03-23 RX ADMIN — MIRTAZAPINE 15 MG: 7.5 TABLET ORAL at 21:32

## 2019-03-23 RX ADMIN — ATORVASTATIN CALCIUM 40 MG: 40 TABLET, FILM COATED ORAL at 09:04

## 2019-03-23 RX ADMIN — ASPIRIN 81 MG CHEWABLE TABLET 81 MG: 81 TABLET CHEWABLE at 09:04

## 2019-03-23 RX ADMIN — METOPROLOL TARTRATE 25 MG: 25 TABLET, FILM COATED ORAL at 20:35

## 2019-03-23 NOTE — PLAN OF CARE
FOCUS/GOAL  Medical management    ASSESSMENT, INTERVENTIONS AND CONTINUING PLAN FOR GOAL:  Patient slept well overnight. No pain or SOB. Alert and oriented X4. Pt ambulates with an assist X1 and walker. Continent of bladder this shift using the urinal. Incision to left hip is BUCKY, remains approximated with no drainage. PIV to right antecubital remains patent to flush.  Will continue to monitor.

## 2019-03-23 NOTE — PHARMACY-MEDICATION REGIMEN REVIEW
Pharmacy Medication Regimen Review  Noe Florence is a 83 year old male who is currently in the Acute Rehab Unit.    Assessment: All medications have an appropriate indications, durations and no unnecessary use was found.    Plan:   Continue current treatment.  Please add BP/HR hold parameters to doxazosin and metoprolol.  Attending provider will be sent this note for review.  If there are any emergent issues noted above, pharmacist will contact provider directly by phone.      Pharmacy will periodically review the resident's medication regimen for any PRN medications not administered in > 72 hours and discontinue them. The pharmacist will discuss gradual dose reductions of psychopharmacologic medications with interdisciplinary team on a regular basis.    Please contact pharmacy if the above does not answer specific medication questions/concerns.    Background:  A pharmacist has reviewed all medications and pertinent medical history today.  Medications were reviewed for appropriate use and any irregularities found are listed with recommendations.      Current Facility-Administered Medications:      0.9% sodium chloride BOLUS, 1-250 mL, Intravenous, Q1H PRN, Moon Irizarry MD     acetaminophen (TYLENOL) tablet 975 mg, 975 mg, Oral, Q8H, Moon Irizarry MD, 975 mg at 03/22/19 1854     aspirin (ASA) chewable tablet 81 mg, 81 mg, Oral, Daily, Moon Irizarry MD, 81 mg at 03/22/19 0919     atorvastatin (LIPITOR) tablet 40 mg, 40 mg, Oral, Daily, Moon Irizarry MD, 40 mg at 03/22/19 0918     bisacodyl (DULCOLAX) Suppository 10 mg, 10 mg, Rectal, Daily PRN, Moon Irizarry MD     calcium carbonate 500 mg (elemental) (OSCAL;OYSTER SHELL CALCIUM) tablet 500 mg, 500 mg, Oral, BID w/meals, Moon Irizarry MD, 500 mg at 03/22/19 1854     cyanocobalamin (VITAMIN B-12) tablet 1,000 mcg, 1,000 mcg, Oral, BID, Moon Irizarry MD, 1,000 mcg at 03/22/19 0919     doxazosin (CARDURA) tablet 1 mg, 1 mg, Oral, BID, Moon Irizarry  MD, 1 mg at 03/22/19 0918     Lidocaine (LIDOCARE) 4 % Patch 1-2 patch, 1-2 patch, Transdermal, Q24H, Moon Irizarry MD, 1 patch at 03/22/19 0919     lidocaine patch in PLACE, , Transdermal, Q8H, Moon Irizarry MD     lidocaine patch REMOVAL, , Transdermal, Q24h, Moon Irizarry MD     methocarbamol (ROBAXIN) tablet 750 mg, 750 mg, Oral, Q6H PRN, Moon Irizarry MD, 750 mg at 03/20/19 0303     metoprolol tartrate (LOPRESSOR) tablet 25 mg, 25 mg, Oral, BID, Moon Irizarry MD, 25 mg at 03/22/19 0918     mirtazapine (REMERON) tablet TABS 15 mg, 15 mg, Oral, At Bedtime, Moon Irizarry MD, 15 mg at 03/21/19 2122     multivitamin w/minerals (THERA-VIT-M) tablet 1 tablet, 1 tablet, Oral, Daily, Moon Irizarry MD, 1 tablet at 03/22/19 0918     naloxone (NARCAN) injection 0.1-0.4 mg, 0.1-0.4 mg, Intravenous, Q2 Min PRN, Moon Irizarry MD     oxyCODONE IR (ROXICODONE) half-tab 2.5-5 mg, 2.5-5 mg, Oral, Q4H PRN, Moon Irizarry MD, 2.5 mg at 03/22/19 0834     Patient is already receiving anticoagulation with heparin, enoxaparin (LOVENOX), warfarin (COUMADIN)  or other anticoagulant medication, , Does not apply, Continuous PRN, Moon Irizarry MD     polyethylene glycol (MIRALAX/GLYCOLAX) Packet 17 g, 17 g, Oral, Daily, Moon Irizarry MD, 17 g at 03/22/19 0919     psyllium (METAMUCIL/KONSYL) Packet 1 packet, 1 packet, Oral, Daily, Moon Irizarry MD, 1 packet at 03/22/19 0919     senna-docusate (SENOKOT-S/PERICOLACE) 8.6-50 MG per tablet 1-2 tablet, 1-2 tablet, Oral, BID, Moon Irizarry MD, 1 tablet at 03/22/19 0918     sertraline (ZOLOFT) tablet 100 mg, 100 mg, Oral, QPM, Moon Irizarry MD, 100 mg at 03/21/19 2107     sodium chloride (PF) 0.9% PF flush 3 mL, 3 mL, Intracatheter, q1 min prn, Moon Irizarry MD     sodium chloride (PF) 0.9% PF flush 3 mL, 3 mL, Intracatheter, Q8H, Moon Irizarry MD     vitamin D3 (CHOLECALCIFEROL) 1000 units (25 mcg) tablet 1,000 Units, 1,000 Units, Oral, BID, Moon Irizarry MD, 1,000  Units at 03/22/19 0918     Warfarin Therapy Reminder (Check START DATE - warfarin may be starting in the FUTURE), 1 each, Does not apply, Continuous PRN, Moon Irizarry MD  No current outpatient prescriptions on file.   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

## 2019-03-23 NOTE — PLAN OF CARE
OT- Patient reporting fatigue following PT session this a.m. And declining any OOB activity - did participate in full session with encouragement. May need to space therapies out - did encourage to get rest following noon meal for second half of day sessions

## 2019-03-23 NOTE — PLAN OF CARE
PT: -10 min AM session due to fatigue and encouraging pt to participate. Pt amb 55' with FWW and CGA. Continue per POC as appropriate.

## 2019-03-23 NOTE — PROGRESS NOTES
FOCUS/GOAL  Bowel management, Bladder management, Pain management and Wound care management    ASSESSMENT, INTERVENTIONS AND CONTINUING PLAN FOR GOAL:    Patient is Alert and Oriented x4, using call light appropriately. A1 with walker. Continent LBM today. PIV to R. Antecubital patent, saline locked. Pain in L. hip managed with scheduled tylenol and lidocaine patches. Incision on L. Hip BUCKY, no drainage noted. Continue with POC.

## 2019-03-23 NOTE — PROGRESS NOTES
[facility], [place]   Physical Medicine and Rehabilitation Daily Note           Assessment and Plan of Care:   Noe Florence is an 82 yo M who was admitted after he sustained a L intertrochanteric femoral fracture from a fall requiring ORIF on 3/14/19. Admission to ARU on 3/19/19.     --Vitals stable - BPs a bit soft, will continue to monitor.  --Labs: Hgb up to 8.6 today, as he had been dealing with blood loss anemia from his surgery. INR therapeutic at 2.08 today.   --Urinary frequency - will order a UA just to make sure he does not have a urinary tract infection given his urinary frequency.   --Continue ongoing medical management.  --Continue therapies and plan of care.             Interval history:   Patient seen and examined at bedside. He is doing well today. Feels he is progressing in therapies. His only complaint today is ongoing urinary frequency. Denies any dysuria or foul smelling urine. Denies fever, chills, CP, SOB, N/V, abdominal pain, new pain or weakness/numbness/tingling.             Physical Exam:   VS:   Vitals:    03/22/19 1503 03/22/19 1536 03/22/19 2152 03/23/19 0914   BP: 112/50 110/52 144/61 109/46   BP Location:  Left arm  Left arm   Pulse: 71 66 69 64   Resp: 14 16  16   Temp: 96.9  F (36.1  C) 97  F (36.1  C)  96.8  F (36  C)   TempSrc: Axillary Oral  Oral   SpO2: 98% 95%  96%   Weight:       Height:         Gen: NAD, resting comfortably in bed  Heart: RRR  Lungs: breathing unlabored on room air  Abd: soft and non-tender  Ext: no edema in BLE, no calf tenderness  MSK/neuro: Alert and oriented, speech fluent, moves all four extremities volitionally.   Skin: L hip incision open to air, very mild erythema, staple intact.          Data:   Scheduled meds    acetaminophen  975 mg Oral Q8H     aspirin  81 mg Oral Daily     atorvastatin  40 mg Oral Daily     calcium carbonate 500 mg (elemental)  500 mg Oral BID w/meals     cyanocobalamin  1,000 mcg Oral BID      doxazosin  1 mg Oral BID     lidocaine  1-2 patch Transdermal Q24H     lidocaine   Transdermal Q8H     lidocaine   Transdermal Q24h     metoprolol tartrate  25 mg Oral BID     mirtazapine  15 mg Oral At Bedtime     multivitamin w/minerals  1 tablet Oral Daily     polyethylene glycol  17 g Oral Daily     psyllium  1 packet Oral Daily     senna-docusate  1-2 tablet Oral BID     sertraline  100 mg Oral QPM     sodium chloride (PF)  3 mL Intracatheter Q8H     vitamin D3  1,000 Units Oral BID     warfarin  6 mg Oral ONCE at 18:00       PRN meds:  sodium chloride 0.9%, bisacodyl, methocarbamol, naloxone, oxyCODONE, - MEDICATION INSTRUCTIONS -, sodium chloride (PF), Warfarin Therapy Reminder      Jade Babcock  Physical Medicine and Rehabilitation     I spent a total of 25 minutes face-to-face and managing the care of Noe Florence. Over 50% of my time on the unit was spent counseling the patient and coordinating care. Please see note for details.

## 2019-03-24 LAB — INR PPP: 2.2 (ref 0.86–1.14)

## 2019-03-24 PROCEDURE — 25000132 ZZH RX MED GY IP 250 OP 250 PS 637: Performed by: PHYSICAL MEDICINE & REHABILITATION

## 2019-03-24 PROCEDURE — 97535 SELF CARE MNGMENT TRAINING: CPT | Mod: GO

## 2019-03-24 PROCEDURE — 97530 THERAPEUTIC ACTIVITIES: CPT | Mod: GP

## 2019-03-24 PROCEDURE — 97110 THERAPEUTIC EXERCISES: CPT | Mod: GP

## 2019-03-24 PROCEDURE — 97530 THERAPEUTIC ACTIVITIES: CPT | Mod: GO

## 2019-03-24 PROCEDURE — 85610 PROTHROMBIN TIME: CPT | Performed by: PHYSICAL MEDICINE & REHABILITATION

## 2019-03-24 PROCEDURE — 97110 THERAPEUTIC EXERCISES: CPT | Mod: GO

## 2019-03-24 PROCEDURE — 36415 COLL VENOUS BLD VENIPUNCTURE: CPT | Performed by: PHYSICAL MEDICINE & REHABILITATION

## 2019-03-24 PROCEDURE — 12800006 ZZH R&B REHAB

## 2019-03-24 PROCEDURE — 97116 GAIT TRAINING THERAPY: CPT | Mod: GP

## 2019-03-24 RX ORDER — WARFARIN SODIUM 5 MG/1
5 TABLET ORAL
Status: COMPLETED | OUTPATIENT
Start: 2019-03-24 | End: 2019-03-24

## 2019-03-24 RX ADMIN — CALCIUM 500 MG: 500 TABLET ORAL at 18:07

## 2019-03-24 RX ADMIN — SENNOSIDES AND DOCUSATE SODIUM 2 TABLET: 8.6; 5 TABLET ORAL at 21:33

## 2019-03-24 RX ADMIN — DOXAZOSIN MESYLATE 1 MG: 1 TABLET ORAL at 21:33

## 2019-03-24 RX ADMIN — MIRTAZAPINE 15 MG: 7.5 TABLET ORAL at 21:33

## 2019-03-24 RX ADMIN — METOPROLOL TARTRATE 25 MG: 25 TABLET, FILM COATED ORAL at 08:17

## 2019-03-24 RX ADMIN — VITAMIN D, TAB 1000IU (100/BT) 1000 UNITS: 25 TAB at 21:33

## 2019-03-24 RX ADMIN — ASPIRIN 81 MG CHEWABLE TABLET 81 MG: 81 TABLET CHEWABLE at 08:08

## 2019-03-24 RX ADMIN — ATORVASTATIN CALCIUM 40 MG: 40 TABLET, FILM COATED ORAL at 08:09

## 2019-03-24 RX ADMIN — METOPROLOL TARTRATE 25 MG: 25 TABLET, FILM COATED ORAL at 21:33

## 2019-03-24 RX ADMIN — VITAMIN D, TAB 1000IU (100/BT) 1000 UNITS: 25 TAB at 08:20

## 2019-03-24 RX ADMIN — ACETAMINOPHEN 975 MG: 325 TABLET, FILM COATED ORAL at 21:33

## 2019-03-24 RX ADMIN — CYANOCOBALAMIN TAB 1000 MCG 1000 MCG: 1000 TAB at 21:33

## 2019-03-24 RX ADMIN — MULTIPLE VITAMINS W/ MINERALS TAB 1 TABLET: TAB at 08:09

## 2019-03-24 RX ADMIN — ACETAMINOPHEN 975 MG: 325 TABLET, FILM COATED ORAL at 03:31

## 2019-03-24 RX ADMIN — POLYETHYLENE GLYCOL 3350 17 G: 17 POWDER, FOR SOLUTION ORAL at 08:14

## 2019-03-24 RX ADMIN — CALCIUM 500 MG: 500 TABLET ORAL at 08:09

## 2019-03-24 RX ADMIN — WARFARIN SODIUM 5 MG: 5 TABLET ORAL at 18:07

## 2019-03-24 RX ADMIN — PSYLLIUM HUSK 1 PACKET: 3.4 POWDER ORAL at 08:09

## 2019-03-24 RX ADMIN — LIDOCAINE 1 PATCH: 560 PATCH PERCUTANEOUS; TOPICAL; TRANSDERMAL at 08:19

## 2019-03-24 RX ADMIN — DOXAZOSIN MESYLATE 1 MG: 1 TABLET ORAL at 08:18

## 2019-03-24 RX ADMIN — SERTRALINE HYDROCHLORIDE 100 MG: 100 TABLET ORAL at 21:33

## 2019-03-24 RX ADMIN — CYANOCOBALAMIN TAB 1000 MCG 1000 MCG: 1000 TAB at 08:17

## 2019-03-24 RX ADMIN — ACETAMINOPHEN 975 MG: 325 TABLET, FILM COATED ORAL at 11:11

## 2019-03-24 NOTE — PLAN OF CARE
FOCUS/GOAL  Mobility    ASSESSMENT, INTERVENTIONS AND CONTINUING PLAN FOR GOAL:  Left hip incision CDI, staples intact. Taking food et fluids well. Compression sock to LLE. Ambulated in hallway with therapies this shift.

## 2019-03-24 NOTE — PLAN OF CARE
PT: pt tolerated sessions well. Pt amb with SBA using FWW during AM session. Continued L HS shortening. Continue per POC as appropriate.

## 2019-03-24 NOTE — PROGRESS NOTES
FOCUS/GOAL  Bowel management, Bladder management, Nutrition/Feeding/Swallowing precautions, Pain management and Cognition/Memory/Judgment/Problem solving    ASSESSMENT, INTERVENTIONS AND CONTINUING PLAN FOR GOAL:    A&O, uses call light appropriately. UA came back negative, patient has some urinary urgency and incontinence. Last BM 3/23, incisions BUCKY without drainage. Good appetite tonight, ate 100% of meal. No reports of pain, scheduled analgesics given. Will continue to follow POC.    NOC:  Continent of urine in urinal, no reports of pain. Slept throughout the night.

## 2019-03-25 LAB
ALBUMIN SERPL-MCNC: 2.5 G/DL (ref 3.4–5)
ALP SERPL-CCNC: 160 U/L (ref 40–150)
ALT SERPL W P-5'-P-CCNC: 37 U/L (ref 0–70)
ANION GAP SERPL CALCULATED.3IONS-SCNC: 7 MMOL/L (ref 3–14)
AST SERPL W P-5'-P-CCNC: 29 U/L (ref 0–45)
BILIRUB SERPL-MCNC: 0.4 MG/DL (ref 0.2–1.3)
BUN SERPL-MCNC: 43 MG/DL (ref 7–30)
CALCIUM SERPL-MCNC: 8.1 MG/DL (ref 8.5–10.1)
CHLORIDE SERPL-SCNC: 113 MMOL/L (ref 94–109)
CO2 SERPL-SCNC: 25 MMOL/L (ref 20–32)
CREAT SERPL-MCNC: 1.2 MG/DL (ref 0.66–1.25)
ERYTHROCYTE [DISTWIDTH] IN BLOOD BY AUTOMATED COUNT: 17.4 % (ref 10–15)
GFR SERPL CREATININE-BSD FRML MDRD: 55 ML/MIN/{1.73_M2}
GLUCOSE SERPL-MCNC: 82 MG/DL (ref 70–99)
HCT VFR BLD AUTO: 26 % (ref 40–53)
HGB BLD-MCNC: 8.1 G/DL (ref 13.3–17.7)
INR PPP: 2.38 (ref 0.86–1.14)
MCH RBC QN AUTO: 28.3 PG (ref 26.5–33)
MCHC RBC AUTO-ENTMCNC: 31.2 G/DL (ref 31.5–36.5)
MCV RBC AUTO: 91 FL (ref 78–100)
PLATELET # BLD AUTO: 259 10E9/L (ref 150–450)
POTASSIUM SERPL-SCNC: 4.9 MMOL/L (ref 3.4–5.3)
PROT SERPL-MCNC: 6 G/DL (ref 6.8–8.8)
RBC # BLD AUTO: 2.86 10E12/L (ref 4.4–5.9)
SODIUM SERPL-SCNC: 145 MMOL/L (ref 133–144)
WBC # BLD AUTO: 6.1 10E9/L (ref 4–11)

## 2019-03-25 PROCEDURE — 97116 GAIT TRAINING THERAPY: CPT | Mod: GP | Performed by: STUDENT IN AN ORGANIZED HEALTH CARE EDUCATION/TRAINING PROGRAM

## 2019-03-25 PROCEDURE — 25000132 ZZH RX MED GY IP 250 OP 250 PS 637: Performed by: PHYSICAL MEDICINE & REHABILITATION

## 2019-03-25 PROCEDURE — 97116 GAIT TRAINING THERAPY: CPT | Mod: GP

## 2019-03-25 PROCEDURE — 97530 THERAPEUTIC ACTIVITIES: CPT | Mod: GP

## 2019-03-25 PROCEDURE — 97535 SELF CARE MNGMENT TRAINING: CPT | Mod: GO | Performed by: STUDENT IN AN ORGANIZED HEALTH CARE EDUCATION/TRAINING PROGRAM

## 2019-03-25 PROCEDURE — 36415 COLL VENOUS BLD VENIPUNCTURE: CPT | Performed by: PHYSICAL MEDICINE & REHABILITATION

## 2019-03-25 PROCEDURE — 97110 THERAPEUTIC EXERCISES: CPT | Mod: GP

## 2019-03-25 PROCEDURE — 97530 THERAPEUTIC ACTIVITIES: CPT | Mod: GP | Performed by: STUDENT IN AN ORGANIZED HEALTH CARE EDUCATION/TRAINING PROGRAM

## 2019-03-25 PROCEDURE — 97112 NEUROMUSCULAR REEDUCATION: CPT | Mod: GP | Performed by: STUDENT IN AN ORGANIZED HEALTH CARE EDUCATION/TRAINING PROGRAM

## 2019-03-25 PROCEDURE — 85610 PROTHROMBIN TIME: CPT | Performed by: PHYSICAL MEDICINE & REHABILITATION

## 2019-03-25 PROCEDURE — 80053 COMPREHEN METABOLIC PANEL: CPT | Performed by: PHYSICAL MEDICINE & REHABILITATION

## 2019-03-25 PROCEDURE — 12800006 ZZH R&B REHAB

## 2019-03-25 PROCEDURE — 85027 COMPLETE CBC AUTOMATED: CPT | Performed by: PHYSICAL MEDICINE & REHABILITATION

## 2019-03-25 RX ORDER — WARFARIN SODIUM 5 MG/1
5 TABLET ORAL
Status: COMPLETED | OUTPATIENT
Start: 2019-03-25 | End: 2019-03-25

## 2019-03-25 RX ADMIN — LIDOCAINE 2 PATCH: 560 PATCH PERCUTANEOUS; TOPICAL; TRANSDERMAL at 09:40

## 2019-03-25 RX ADMIN — MULTIPLE VITAMINS W/ MINERALS TAB 1 TABLET: TAB at 09:43

## 2019-03-25 RX ADMIN — ACETAMINOPHEN 975 MG: 325 TABLET, FILM COATED ORAL at 12:40

## 2019-03-25 RX ADMIN — SERTRALINE HYDROCHLORIDE 100 MG: 100 TABLET ORAL at 21:03

## 2019-03-25 RX ADMIN — SENNOSIDES AND DOCUSATE SODIUM 1 TABLET: 8.6; 5 TABLET ORAL at 21:03

## 2019-03-25 RX ADMIN — CYANOCOBALAMIN TAB 1000 MCG 1000 MCG: 1000 TAB at 09:43

## 2019-03-25 RX ADMIN — VITAMIN D, TAB 1000IU (100/BT) 1000 UNITS: 25 TAB at 21:03

## 2019-03-25 RX ADMIN — DOXAZOSIN MESYLATE 1 MG: 1 TABLET ORAL at 09:42

## 2019-03-25 RX ADMIN — ASPIRIN 81 MG CHEWABLE TABLET 81 MG: 81 TABLET CHEWABLE at 09:44

## 2019-03-25 RX ADMIN — CALCIUM 500 MG: 500 TABLET ORAL at 09:43

## 2019-03-25 RX ADMIN — CYANOCOBALAMIN TAB 1000 MCG 1000 MCG: 1000 TAB at 21:03

## 2019-03-25 RX ADMIN — ACETAMINOPHEN 975 MG: 325 TABLET, FILM COATED ORAL at 18:49

## 2019-03-25 RX ADMIN — WARFARIN SODIUM 5 MG: 5 TABLET ORAL at 18:48

## 2019-03-25 RX ADMIN — ATORVASTATIN CALCIUM 40 MG: 40 TABLET, FILM COATED ORAL at 09:42

## 2019-03-25 RX ADMIN — PSYLLIUM HUSK 1 PACKET: 3.4 POWDER ORAL at 09:42

## 2019-03-25 RX ADMIN — VITAMIN D, TAB 1000IU (100/BT) 1000 UNITS: 25 TAB at 09:42

## 2019-03-25 RX ADMIN — DOXAZOSIN MESYLATE 1 MG: 1 TABLET ORAL at 21:04

## 2019-03-25 RX ADMIN — CALCIUM 500 MG: 500 TABLET ORAL at 18:49

## 2019-03-25 RX ADMIN — ACETAMINOPHEN 975 MG: 325 TABLET, FILM COATED ORAL at 02:38

## 2019-03-25 RX ADMIN — METOPROLOL TARTRATE 25 MG: 25 TABLET, FILM COATED ORAL at 21:04

## 2019-03-25 RX ADMIN — METOPROLOL TARTRATE 25 MG: 25 TABLET, FILM COATED ORAL at 09:43

## 2019-03-25 RX ADMIN — MIRTAZAPINE 15 MG: 7.5 TABLET ORAL at 21:06

## 2019-03-25 NOTE — PLAN OF CARE
OT: Working on ADL routine using AE including useof AE. Needs some cues for problem so,ving strategies, SBA at the sink and CGA for functional mobility in room using walker. MIN A for LB dressing.    Significant other present for second session. SO extremely concerned about discharge home and reports that there would be no where for the pt to sleep as she will not be able to fit a twin bed in the space. She reports the couch would not be a viable solution and also reports that even if the clutter of there still would not be enough room for walking and to place a bed. Offered other solutions such as storing other furniture temporarily. SO reports this is not an option. She is going to send us pictures but feels that TCU is pt's only option. Will discuss with team.

## 2019-03-25 NOTE — PLAN OF CARE
FOCUS/GOAL  Bowel management, Bladder management, Pain management and Wound care management    ASSESSMENT, INTERVENTIONS AND CONTINUING PLAN FOR GOAL:  AOX4, uses call light and able to make needs known. CGA with walker for transfers. Continent pf B&B and uses urinal independently. Reports having BM in the AM. Pain in left hip managed with scheduled pain meds with relief. Denies nausea or SOB, pleasant and cooperative with care. Continue with POC.

## 2019-03-25 NOTE — PROGRESS NOTES
PM&R Daily Note:    83-year-old previously active gentleman even playing tennis who had fall on the ice with a left intertrochanteric hip fracture, status post ORIF with IM nail 3/14.  Admitted to inpatient rehab 3/19.    Sha does continue to participate in therapies though he continues with difficulty moving the left leg.  He is weightbearing as tolerated however is not been tolerating a lot yet.  He does have stairs to access his home which currently are a key challenge and hope to be able to achieve this.    Cardiac: History of atrial fibrillation, chronically on warfarin which has resumed, INR therapeutic 2.38 today.  Blood pressure and heart rate is reasonably well controlled, continues metoprolol 25 mg twice daily.    Postoperative anemia required transfusion, hemoglobin low 6.8, transfused up to 8.6 in follow-up today 8.1. No evidence of ongoing bleeding, will monitor hemoglobin next on Thursday.  Other electrolytes mild hypernatremia. BUN slightly up at 43, Cr normal. Will encourage oral hydration.      Vitals:    03/24/19 2133 03/25/19 0613 03/25/19 0855 03/25/19 0937   BP: 130/58 145/64 111/43 126/47   BP Location:  Left arm Right arm Right arm   Pulse: 61 60  63   Resp:  18  18   Temp:  96.9  F (36.1  C)  95.7  F (35.4  C)   TempSrc:  Oral  Oral   SpO2:  99%  99%   Weight:       Height:         Alert, conversant  No diaphroesis  Heart irregular, rate controlled  Lungs clear  Left leg from supine position during my exam, normal sensation in toes.  Didn't view incision today.       acetaminophen  975 mg Oral Q8H     aspirin  81 mg Oral Daily     atorvastatin  40 mg Oral Daily     calcium carbonate 500 mg (elemental)  500 mg Oral BID w/meals     cyanocobalamin  1,000 mcg Oral BID     doxazosin  1 mg Oral BID     lidocaine  1-2 patch Transdermal Q24H     lidocaine   Transdermal Q8H     lidocaine   Transdermal Q24h     metoprolol tartrate  25 mg Oral BID     mirtazapine  15 mg Oral At Bedtime     multivitamin  w/minerals  1 tablet Oral Daily     polyethylene glycol  17 g Oral Daily     psyllium  1 packet Oral Daily     senna-docusate  1-2 tablet Oral BID     sertraline  100 mg Oral QPM     sodium chloride (PF)  3 mL Intracatheter Q8H     vitamin D3  1,000 Units Oral BID     warfarin  5 mg Oral ONCE at 18:00

## 2019-03-25 NOTE — PLAN OF CARE
FOCUS/GOAL  Bowel management, Bladder management and Wound care management    ASSESSMENT, INTERVENTIONS AND CONTINUING PLAN FOR GOAL:      Patient is A&Ox4, continent of bowel/bladder. No BM this shift. Transfers CGA with a walker. Left hip/leg incisions are CDI and BUCKY. Right PIV patent, dressing changed this shift. No c/o pain, SOB or n/v/d. Continue POC.

## 2019-03-25 NOTE — PLAN OF CARE
"Pt making steady progress and has strong potential to discharge home safely with follow up home PT. Initiated stairs today, completed 6\" steps using B rails, step to pattern with CGA. Pt states currently lacking continuous B rails, but that could be remedied prior to returning home. Session also focused on improved WB through L foot and equal step length for improved gait and LLE WB. Anticipate up to 5 more days to be ready for home.   "

## 2019-03-25 NOTE — PLAN OF CARE
Alert and oriented. Able to make needs known. Call light in reach. Pain managed with scheduled Tylenol. Using urinal at bedside, also incontinent of urine X 1. Appears to be sleeping during rounds. Bed alarm on for safety. Continue with plan of care.

## 2019-03-26 LAB — INR PPP: 2.72 (ref 0.86–1.14)

## 2019-03-26 PROCEDURE — 25000128 H RX IP 250 OP 636: Performed by: PHYSICAL MEDICINE & REHABILITATION

## 2019-03-26 PROCEDURE — 36415 COLL VENOUS BLD VENIPUNCTURE: CPT | Performed by: PHYSICAL MEDICINE & REHABILITATION

## 2019-03-26 PROCEDURE — 85610 PROTHROMBIN TIME: CPT | Performed by: PHYSICAL MEDICINE & REHABILITATION

## 2019-03-26 PROCEDURE — 97535 SELF CARE MNGMENT TRAINING: CPT | Mod: GO | Performed by: STUDENT IN AN ORGANIZED HEALTH CARE EDUCATION/TRAINING PROGRAM

## 2019-03-26 PROCEDURE — 97110 THERAPEUTIC EXERCISES: CPT | Mod: GP | Performed by: REHABILITATION PRACTITIONER

## 2019-03-26 PROCEDURE — 25000132 ZZH RX MED GY IP 250 OP 250 PS 637: Performed by: PHYSICAL MEDICINE & REHABILITATION

## 2019-03-26 PROCEDURE — 97530 THERAPEUTIC ACTIVITIES: CPT | Mod: GP | Performed by: REHABILITATION PRACTITIONER

## 2019-03-26 PROCEDURE — 12800006 ZZH R&B REHAB

## 2019-03-26 PROCEDURE — 97530 THERAPEUTIC ACTIVITIES: CPT | Mod: GP

## 2019-03-26 PROCEDURE — 97116 GAIT TRAINING THERAPY: CPT | Mod: GP | Performed by: REHABILITATION PRACTITIONER

## 2019-03-26 PROCEDURE — 25000132 ZZH RX MED GY IP 250 OP 250 PS 637

## 2019-03-26 PROCEDURE — 97110 THERAPEUTIC EXERCISES: CPT | Mod: GP

## 2019-03-26 PROCEDURE — 97116 GAIT TRAINING THERAPY: CPT | Mod: GP

## 2019-03-26 RX ORDER — WARFARIN SODIUM 2.5 MG/1
2.5 TABLET ORAL
Status: COMPLETED | OUTPATIENT
Start: 2019-03-26 | End: 2019-03-26

## 2019-03-26 RX ADMIN — DOXAZOSIN MESYLATE 1 MG: 1 TABLET ORAL at 20:24

## 2019-03-26 RX ADMIN — CYANOCOBALAMIN TAB 1000 MCG 1000 MCG: 1000 TAB at 09:14

## 2019-03-26 RX ADMIN — CYANOCOBALAMIN TAB 1000 MCG 1000 MCG: 1000 TAB at 20:24

## 2019-03-26 RX ADMIN — VITAMIN D, TAB 1000IU (100/BT) 1000 UNITS: 25 TAB at 20:24

## 2019-03-26 RX ADMIN — SENNOSIDES AND DOCUSATE SODIUM 2 TABLET: 8.6; 5 TABLET ORAL at 20:24

## 2019-03-26 RX ADMIN — WARFARIN SODIUM 2.5 MG: 2.5 TABLET ORAL at 18:36

## 2019-03-26 RX ADMIN — METOPROLOL TARTRATE 25 MG: 25 TABLET, FILM COATED ORAL at 20:24

## 2019-03-26 RX ADMIN — ACETAMINOPHEN 975 MG: 325 TABLET, FILM COATED ORAL at 12:11

## 2019-03-26 RX ADMIN — LIDOCAINE 1 PATCH: 560 PATCH PERCUTANEOUS; TOPICAL; TRANSDERMAL at 09:12

## 2019-03-26 RX ADMIN — CALCIUM 500 MG: 500 TABLET ORAL at 09:14

## 2019-03-26 RX ADMIN — MIRTAZAPINE 15 MG: 7.5 TABLET ORAL at 21:45

## 2019-03-26 RX ADMIN — METHOCARBAMOL 750 MG: 750 TABLET, FILM COATED ORAL at 02:51

## 2019-03-26 RX ADMIN — MULTIPLE VITAMINS W/ MINERALS TAB 1 TABLET: TAB at 09:15

## 2019-03-26 RX ADMIN — CALCIUM 500 MG: 500 TABLET ORAL at 18:36

## 2019-03-26 RX ADMIN — ACETAMINOPHEN 975 MG: 325 TABLET, FILM COATED ORAL at 18:35

## 2019-03-26 RX ADMIN — ACETAMINOPHEN 975 MG: 325 TABLET, FILM COATED ORAL at 02:52

## 2019-03-26 RX ADMIN — SODIUM CHLORIDE, PRESERVATIVE FREE 3 ML: 5 INJECTION INTRAVENOUS at 21:46

## 2019-03-26 RX ADMIN — SENNOSIDES AND DOCUSATE SODIUM 1 TABLET: 8.6; 5 TABLET ORAL at 09:14

## 2019-03-26 RX ADMIN — METOPROLOL TARTRATE 25 MG: 25 TABLET, FILM COATED ORAL at 09:14

## 2019-03-26 RX ADMIN — SERTRALINE HYDROCHLORIDE 100 MG: 100 TABLET ORAL at 20:25

## 2019-03-26 RX ADMIN — DOXAZOSIN MESYLATE 1 MG: 1 TABLET ORAL at 09:14

## 2019-03-26 RX ADMIN — VITAMIN D, TAB 1000IU (100/BT) 1000 UNITS: 25 TAB at 09:13

## 2019-03-26 RX ADMIN — ATORVASTATIN CALCIUM 40 MG: 40 TABLET, FILM COATED ORAL at 09:14

## 2019-03-26 RX ADMIN — PSYLLIUM HUSK 1 PACKET: 3.4 POWDER ORAL at 09:13

## 2019-03-26 RX ADMIN — ASPIRIN 81 MG CHEWABLE TABLET 81 MG: 81 TABLET CHEWABLE at 09:13

## 2019-03-26 NOTE — PLAN OF CARE
Pt is a&ox4. Denied pain this shift. Ao1 using walker, voiding standing at toilet and using urinal once in bed. Incontinent x1 due to urgency and urinal complications. Incision aleln, pink/reddness around staples. Alarms on for safety.

## 2019-03-26 NOTE — PLAN OF CARE
Pt slept well throughout the night. Pt has frequent urination at [facility]- uses urinal at bed side. Pt reports some issues holding the urinal and did have an incident where he spilled some urine on himself- encouraged Pt to call for help with urinal if needed. C/O muscle spasm pain in LLE- Scheduled tylenol and PRN robaxin given at 0252. No further c/o pain- will continue to monitor. Bed alarm on for safety, call light within reach. OK to continue with care plan.

## 2019-03-26 NOTE — PLAN OF CARE
PT:  pt is progress well, still limited by weakness and pain. Pt amb up to 200'x 2 with WW needing SBA to CGA with V.c for step length. Pt demo supine TE program x 10 needing SBA for supine to sit, but min A for sit to supine.

## 2019-03-26 NOTE — PLAN OF CARE
Patient up in the chair during meals,eating and drinking okay,ate 100 % of his meal,urinal by his bedside,staff assists patient with pants,pull down and up.Left hip incision intact,staples BUCKY,no redness noted.

## 2019-03-26 NOTE — PLAN OF CARE
OT: Pt doing well, is MIN A for LB dressing, pt reports he was able to do thelma cares himself after a BM with nursing. Endurance and ambulation with walker is improving. Anticipate he will need assist with IADL. Family training tomorrow.

## 2019-03-26 NOTE — PROGRESS NOTES
"PM&R Daily Note:    83-year-old previously active gentleman even playing tennis who had fall on the ice with a left intertrochanteric hip fracture, status post ORIF with IM nail 3/14.  Admitted to inpatient rehab 3/19.    Sha has shown some ongoing improvements with ambulation and continue to work on stairs and other self cares.    I met his wife this morning and had longer conversation about different aspects that might come into play to coordinate a safe home discharge.  She reports [place] home is a \"[place]\".  She has some photographs that she will review with occupational therapy.  Ultimately we will need to have better consistency with stairs but I do think this is achievable.  She is fearful the home may not be ready will be encouraged continue preparations and removing enough clutter for safe access to the primary paths of mobilization with a walker.  She will come in for formal family training tomorrow starting it 9 AM until 11 AM.  We then have a care conference also scheduled for Thursday and review even further.  If all continues to go well, no might be able to get home by end of the month.    Cardiac: History of atrial fibrillation, chronically on warfarin which has resumed, INR therapeutic 2.72 today.  Blood pressure and heart rate is reasonably well controlled, continues metoprolol 25 mg twice daily.    Postoperative anemia required transfusion, hemoglobin low 6.8, transfused >> 8.6 >> 8.1. No evidence of ongoing bleeding, will monitor hemoglobin next on Thursday.  Other electrolytes mild hypernatremia. BUN slightly up at 43, Cr normal. Will encourage oral hydration.      Vitals:    03/25/19 1532 03/25/19 2104 03/26/19 0904 03/26/19 1609   BP: 107/43 132/63 137/61 107/47   BP Location: Right arm  Right arm Right arm   Pulse: 60   60   Resp: 16  17 14   Temp: 96.5  F (35.8  C)  97  F (36.1  C) 96.7  F (35.9  C)   TempSrc: Oral  Oral Oral   SpO2: 99%  100% 97%   Weight:       Height:     "     Alert, conversant  Heart irregular, rate controlled  Lungs clear  Also observed later during therapy session with ambulation using 2WW.  Pace is slow but stable, mild antalgic.  Some kyphosis and needs cues for standing fully upright.        acetaminophen  975 mg Oral Q8H     aspirin  81 mg Oral Daily     atorvastatin  40 mg Oral Daily     calcium carbonate 500 mg (elemental)  500 mg Oral BID w/meals     cyanocobalamin  1,000 mcg Oral BID     doxazosin  1 mg Oral BID     lidocaine  1-2 patch Transdermal Q24H     lidocaine   Transdermal Q8H     lidocaine   Transdermal Q24h     metoprolol tartrate  25 mg Oral BID     mirtazapine  15 mg Oral At Bedtime     multivitamin w/minerals  1 tablet Oral Daily     polyethylene glycol  17 g Oral Daily     psyllium  1 packet Oral Daily     senna-docusate  1-2 tablet Oral BID     sertraline  100 mg Oral QPM     sodium chloride (PF)  3 mL Intracatheter Q8H     vitamin D3  1,000 Units Oral BID     warfarin  2.5 mg Oral ONCE at 18:00     35 minutes spent today, 25 minutes in direct patient and wife interaction, remainder in coordination of care

## 2019-03-27 LAB — INR PPP: 2.5 (ref 0.86–1.14)

## 2019-03-27 PROCEDURE — 97535 SELF CARE MNGMENT TRAINING: CPT | Mod: GO

## 2019-03-27 PROCEDURE — 25000132 ZZH RX MED GY IP 250 OP 250 PS 637: Performed by: PHYSICAL MEDICINE & REHABILITATION

## 2019-03-27 PROCEDURE — 97535 SELF CARE MNGMENT TRAINING: CPT | Mod: GO | Performed by: OCCUPATIONAL THERAPIST

## 2019-03-27 PROCEDURE — 36415 COLL VENOUS BLD VENIPUNCTURE: CPT | Performed by: PHYSICAL MEDICINE & REHABILITATION

## 2019-03-27 PROCEDURE — 40000894 ZZH STATISTIC OT IP EVAL DEFER

## 2019-03-27 PROCEDURE — 12800006 ZZH R&B REHAB

## 2019-03-27 PROCEDURE — 25000132 ZZH RX MED GY IP 250 OP 250 PS 637

## 2019-03-27 PROCEDURE — 85610 PROTHROMBIN TIME: CPT | Performed by: PHYSICAL MEDICINE & REHABILITATION

## 2019-03-27 PROCEDURE — 97116 GAIT TRAINING THERAPY: CPT | Mod: GP | Performed by: STUDENT IN AN ORGANIZED HEALTH CARE EDUCATION/TRAINING PROGRAM

## 2019-03-27 PROCEDURE — 97530 THERAPEUTIC ACTIVITIES: CPT | Mod: GP | Performed by: STUDENT IN AN ORGANIZED HEALTH CARE EDUCATION/TRAINING PROGRAM

## 2019-03-27 RX ORDER — WARFARIN SODIUM 5 MG/1
5 TABLET ORAL
Status: COMPLETED | OUTPATIENT
Start: 2019-03-27 | End: 2019-03-27

## 2019-03-27 RX ADMIN — CALCIUM 500 MG: 500 TABLET ORAL at 08:06

## 2019-03-27 RX ADMIN — CALCIUM 500 MG: 500 TABLET ORAL at 18:46

## 2019-03-27 RX ADMIN — ACETAMINOPHEN 975 MG: 325 TABLET, FILM COATED ORAL at 18:46

## 2019-03-27 RX ADMIN — CYANOCOBALAMIN TAB 1000 MCG 1000 MCG: 1000 TAB at 08:06

## 2019-03-27 RX ADMIN — ACETAMINOPHEN 975 MG: 325 TABLET, FILM COATED ORAL at 10:09

## 2019-03-27 RX ADMIN — SENNOSIDES AND DOCUSATE SODIUM 1 TABLET: 8.6; 5 TABLET ORAL at 21:59

## 2019-03-27 RX ADMIN — VITAMIN D, TAB 1000IU (100/BT) 1000 UNITS: 25 TAB at 08:06

## 2019-03-27 RX ADMIN — ASPIRIN 81 MG CHEWABLE TABLET 81 MG: 81 TABLET CHEWABLE at 08:07

## 2019-03-27 RX ADMIN — MIRTAZAPINE 15 MG: 7.5 TABLET ORAL at 21:58

## 2019-03-27 RX ADMIN — ATORVASTATIN CALCIUM 40 MG: 40 TABLET, FILM COATED ORAL at 08:07

## 2019-03-27 RX ADMIN — WARFARIN SODIUM 5 MG: 5 TABLET ORAL at 18:46

## 2019-03-27 RX ADMIN — VITAMIN D, TAB 1000IU (100/BT) 1000 UNITS: 25 TAB at 21:58

## 2019-03-27 RX ADMIN — DOXAZOSIN MESYLATE 1 MG: 1 TABLET ORAL at 21:58

## 2019-03-27 RX ADMIN — SERTRALINE HYDROCHLORIDE 100 MG: 100 TABLET ORAL at 21:59

## 2019-03-27 RX ADMIN — LIDOCAINE 2 PATCH: 560 PATCH PERCUTANEOUS; TOPICAL; TRANSDERMAL at 08:07

## 2019-03-27 RX ADMIN — CYANOCOBALAMIN TAB 1000 MCG 1000 MCG: 1000 TAB at 21:59

## 2019-03-27 RX ADMIN — MULTIPLE VITAMINS W/ MINERALS TAB 1 TABLET: TAB at 08:07

## 2019-03-27 RX ADMIN — PSYLLIUM HUSK 1 PACKET: 3.4 POWDER ORAL at 08:07

## 2019-03-27 RX ADMIN — ACETAMINOPHEN 975 MG: 325 TABLET, FILM COATED ORAL at 03:03

## 2019-03-27 RX ADMIN — DOXAZOSIN MESYLATE 1 MG: 1 TABLET ORAL at 08:06

## 2019-03-27 RX ADMIN — METOPROLOL TARTRATE 25 MG: 25 TABLET, FILM COATED ORAL at 21:58

## 2019-03-27 RX ADMIN — SENNOSIDES AND DOCUSATE SODIUM 2 TABLET: 8.6; 5 TABLET ORAL at 08:06

## 2019-03-27 RX ADMIN — METOPROLOL TARTRATE 25 MG: 25 TABLET, FILM COATED ORAL at 08:06

## 2019-03-27 ASSESSMENT — MIFFLIN-ST. JEOR: SCORE: 1309.53

## 2019-03-27 NOTE — PLAN OF CARE
Focus: Safety   D: Pt. Was found walking from toilet to bed. Call light on but not alarming as urgent bathroom call. I: Sign placed in room to not leave pt. Unattended while he is in the bathroom. Work order placed to fix the problem with the bathroom emergency light. P: Bed and wheelchair alarm on.

## 2019-03-27 NOTE — PROGRESS NOTES
OT: Complete Family Training with spouse regarding LBD tasks and shower transfers. Spouse provided appropriate assistance and appropriate safety awareness with shower transfer with FWW, grab bars, and shower chair with CGA.    -5 d/t writer late from previous session

## 2019-03-27 NOTE — PLAN OF CARE
Focus: Physio  D: Alert and orientated times four. Continent of bowel and bladder. Voids via the urinal and then staff empties. Good appetite. Up with assist of one, gait belt and walker. Incision to left hip stapled and open to air. P: Continue current plan of care.

## 2019-03-27 NOTE — PLAN OF CARE
Pt a&ox4. Denied pain, states pain is managed well with scheduled tylenol and lidocaine patches. Uses urinal, needs some assistance at times due to spilling with pants being a barrier. Wife coming tomorrow for family training. Moving well with min ao1 and a walker, wears a brace in L shoe. Saline locked PIV in R UE. Alarms on for safety.

## 2019-03-27 NOTE — PROGRESS NOTES
CLINICAL NUTRITION SERVICES - REASSESSMENT NOTE     Nutrition Prescription    RECOMMENDATIONS FOR MDs/PROVIDERS TO ORDER:  None today    Malnutrition Status:    -Patient does not meet two of the above criteria necessary for diagnosing malnutrition    Recommendations already ordered by Registered Dietitian (RD):  -Continue with Boost Plus chocolate with breakfast.    Future/Additional Recommendations:  -Encourage intakes of adequate fluids and tid meals including protein sources.   - Follow up for supplement acceptance and need to adjust flavors/ timings.  -Provide diet education on heart healthy diet prior to discharge (given cardiac history) if requested.     EVALUATION OF THE PROGRESS TOWARD GOALS   Diet: Regular with Thin Liquids  Boost Plus chocolate with breakfast  Room Service Appropriate    Intake: PO intakes have been 100% of most recorded the meals over the past week (75% x 1).  Per review of selections in [facility], pt is ordering well for tid meals.  Pt states he likes the Boost and prefers chocolate flavor.        NEW FINDINGS   Current Weight (3/27) :  65.6 kg/144 lbs 9.6 oz  [facility] Admission Weight (3/19):  69 kg/152 lbs 1.6 oz  Per initial RD assessment (3/20), no weights recorded during initial hospitalization.  Current weight appears consistent with previous trends per hx below.  Wt Readings from Last 5 Encounters:   03/19/19 69 kg (152 lb 1.6 oz)   03/05/19 64.9 kg (143 lb)   02/19/19 65.1 kg (143 lb 9.6 oz)   02/18/19 65.7 kg (144 lb 12.8 oz)   02/06/19 64.3 kg (141 lb 12.8 oz)     Labs:  (3/25)  Na 145 (H)  BUN 43 (H)    MALNUTRITION  % Intake: No decreased intake noted  % Weight Loss: None noted  Subcutaneous Fat Loss: None observed  Muscle Loss: Lower arm  (forearm):  mild  Fluid Accumulation/Edema: Does not meet criteria  Malnutrition Diagnosis: Patient does not meet two of the above criteria necessary for diagnosing malnutrition    Previous Goals   Patient to consume % of nutritionally  adequate meal trays TID, or the equivalent with supplements/snacks.  Evaluation: Met    Previous Nutrition Diagnosis  Predicted inadequate nutrient intake (energy/ protein) related to hospitalization with potential for menu fatigue as evidenced by anticipated intake less than estimated needs.  Evaluation: No change    CURRENT NUTRITION DIAGNOSIS  Predicted inadequate nutrient intake (energy/ protein) related to hospitalization with potential for menu fatigue as evidenced by anticipated intake less than estimated needs.    INTERVENTIONS  Implementation  Nutrition Education:  Encouraged continued good intakes plus supplement.  Offered to review heart healthy diet before discharge, but pt reports already trying to follow this at home.    Goals  Patient to consume % of nutritionally adequate meal trays TID, or the equivalent with supplements/snacks.    Monitoring/Evaluation  Progress toward goals will be monitored and evaluated per protocol.    Bell Flores RD, LD

## 2019-03-27 NOTE — PLAN OF CARE
FOCUS/GOAL  Medical management    ASSESSMENT, INTERVENTIONS AND CONTINUING PLAN FOR GOAL:  Patient slept between usage of urinal, 4 times. He used urinal independently sitting at the edge of the bed, and called staff to empty. No incontinence. No c/o pain. Bed alarm on.

## 2019-03-28 LAB
ANION GAP SERPL CALCULATED.3IONS-SCNC: 9 MMOL/L (ref 3–14)
BUN SERPL-MCNC: 47 MG/DL (ref 7–30)
CALCIUM SERPL-MCNC: 8.3 MG/DL (ref 8.5–10.1)
CHLORIDE SERPL-SCNC: 112 MMOL/L (ref 94–109)
CO2 SERPL-SCNC: 23 MMOL/L (ref 20–32)
CREAT SERPL-MCNC: 1.26 MG/DL (ref 0.66–1.25)
GFR SERPL CREATININE-BSD FRML MDRD: 52 ML/MIN/{1.73_M2}
GLUCOSE SERPL-MCNC: 76 MG/DL (ref 70–99)
HGB BLD-MCNC: 8 G/DL (ref 13.3–17.7)
POTASSIUM SERPL-SCNC: 4.6 MMOL/L (ref 3.4–5.3)
SODIUM SERPL-SCNC: 144 MMOL/L (ref 133–144)

## 2019-03-28 PROCEDURE — 97530 THERAPEUTIC ACTIVITIES: CPT | Mod: GP

## 2019-03-28 PROCEDURE — 85018 HEMOGLOBIN: CPT | Performed by: PHYSICAL MEDICINE & REHABILITATION

## 2019-03-28 PROCEDURE — 25000132 ZZH RX MED GY IP 250 OP 250 PS 637

## 2019-03-28 PROCEDURE — 97110 THERAPEUTIC EXERCISES: CPT | Mod: GP | Performed by: STUDENT IN AN ORGANIZED HEALTH CARE EDUCATION/TRAINING PROGRAM

## 2019-03-28 PROCEDURE — 97535 SELF CARE MNGMENT TRAINING: CPT | Mod: GO | Performed by: STUDENT IN AN ORGANIZED HEALTH CARE EDUCATION/TRAINING PROGRAM

## 2019-03-28 PROCEDURE — 97530 THERAPEUTIC ACTIVITIES: CPT | Mod: GP | Performed by: STUDENT IN AN ORGANIZED HEALTH CARE EDUCATION/TRAINING PROGRAM

## 2019-03-28 PROCEDURE — 40000183 ZZH STATISTIC PT MED CONFERENCE < 30 MIN: Performed by: STUDENT IN AN ORGANIZED HEALTH CARE EDUCATION/TRAINING PROGRAM

## 2019-03-28 PROCEDURE — 12800006 ZZH R&B REHAB

## 2019-03-28 PROCEDURE — 97116 GAIT TRAINING THERAPY: CPT | Mod: GP

## 2019-03-28 PROCEDURE — 25000132 ZZH RX MED GY IP 250 OP 250 PS 637: Performed by: PHYSICAL MEDICINE & REHABILITATION

## 2019-03-28 PROCEDURE — 97530 THERAPEUTIC ACTIVITIES: CPT | Mod: GO | Performed by: STUDENT IN AN ORGANIZED HEALTH CARE EDUCATION/TRAINING PROGRAM

## 2019-03-28 PROCEDURE — 36415 COLL VENOUS BLD VENIPUNCTURE: CPT | Performed by: PHYSICAL MEDICINE & REHABILITATION

## 2019-03-28 PROCEDURE — 40000187 ZZH STATISTIC PATIENT MED CONFERENCE < 30 MIN: Performed by: STUDENT IN AN ORGANIZED HEALTH CARE EDUCATION/TRAINING PROGRAM

## 2019-03-28 PROCEDURE — 80048 BASIC METABOLIC PNL TOTAL CA: CPT | Performed by: PHYSICAL MEDICINE & REHABILITATION

## 2019-03-28 PROCEDURE — 97110 THERAPEUTIC EXERCISES: CPT | Mod: GP

## 2019-03-28 RX ORDER — WARFARIN SODIUM 2.5 MG/1
2.5 TABLET ORAL
Status: COMPLETED | OUTPATIENT
Start: 2019-03-28 | End: 2019-03-28

## 2019-03-28 RX ADMIN — ASPIRIN 81 MG CHEWABLE TABLET 81 MG: 81 TABLET CHEWABLE at 08:24

## 2019-03-28 RX ADMIN — PSYLLIUM HUSK 1 PACKET: 3.4 POWDER ORAL at 08:20

## 2019-03-28 RX ADMIN — DOXAZOSIN MESYLATE 1 MG: 1 TABLET ORAL at 21:28

## 2019-03-28 RX ADMIN — CYANOCOBALAMIN TAB 1000 MCG 1000 MCG: 1000 TAB at 21:27

## 2019-03-28 RX ADMIN — MULTIPLE VITAMINS W/ MINERALS TAB 1 TABLET: TAB at 08:20

## 2019-03-28 RX ADMIN — ACETAMINOPHEN 975 MG: 325 TABLET, FILM COATED ORAL at 02:25

## 2019-03-28 RX ADMIN — METOPROLOL TARTRATE 25 MG: 25 TABLET, FILM COATED ORAL at 21:28

## 2019-03-28 RX ADMIN — METOPROLOL TARTRATE 25 MG: 25 TABLET, FILM COATED ORAL at 08:23

## 2019-03-28 RX ADMIN — MIRTAZAPINE 15 MG: 7.5 TABLET ORAL at 21:28

## 2019-03-28 RX ADMIN — CALCIUM 500 MG: 500 TABLET ORAL at 08:24

## 2019-03-28 RX ADMIN — SENNOSIDES AND DOCUSATE SODIUM 2 TABLET: 8.6; 5 TABLET ORAL at 08:20

## 2019-03-28 RX ADMIN — ACETAMINOPHEN 975 MG: 325 TABLET, FILM COATED ORAL at 19:32

## 2019-03-28 RX ADMIN — SENNOSIDES AND DOCUSATE SODIUM 1 TABLET: 8.6; 5 TABLET ORAL at 21:28

## 2019-03-28 RX ADMIN — WARFARIN SODIUM 2.5 MG: 2.5 TABLET ORAL at 19:31

## 2019-03-28 RX ADMIN — SERTRALINE HYDROCHLORIDE 100 MG: 100 TABLET ORAL at 21:28

## 2019-03-28 RX ADMIN — CYANOCOBALAMIN TAB 1000 MCG 1000 MCG: 1000 TAB at 08:20

## 2019-03-28 RX ADMIN — CALCIUM 500 MG: 500 TABLET ORAL at 19:31

## 2019-03-28 RX ADMIN — VITAMIN D, TAB 1000IU (100/BT) 1000 UNITS: 25 TAB at 21:28

## 2019-03-28 RX ADMIN — LIDOCAINE 1 PATCH: 560 PATCH PERCUTANEOUS; TOPICAL; TRANSDERMAL at 08:37

## 2019-03-28 RX ADMIN — VITAMIN D, TAB 1000IU (100/BT) 1000 UNITS: 25 TAB at 08:24

## 2019-03-28 RX ADMIN — ACETAMINOPHEN 975 MG: 325 TABLET, FILM COATED ORAL at 13:48

## 2019-03-28 RX ADMIN — DOXAZOSIN MESYLATE 1 MG: 1 TABLET ORAL at 08:24

## 2019-03-28 RX ADMIN — ATORVASTATIN CALCIUM 40 MG: 40 TABLET, FILM COATED ORAL at 08:20

## 2019-03-28 NOTE — PLAN OF CARE
FOCUS/GOAL  Bowel management, Bladder management, Pain management, Mobility and Cognition/Memory/Judgment/Problem solving    ASSESSMENT, INTERVENTIONS AND CONTINUING PLAN FOR GOAL:    A&O, Ax1 ww. Last BM 3/27/19. Voiding spontaneously without difficulty. No reports pain this shift. Care conference on 3/28. Will continue to follow POC.

## 2019-03-28 NOTE — CARE CONFERENCE
Acute Rehab Care Conference/Team Rounds      Type: Patient Conference    Present: Dr Jerry Bowden, Leena Esposito LICSW, Noe Florence patient, Mohini Segura PT, Maria Del Carmen Dougherty OT, Bhavana Chatman RN.       Discharge Barriers/Treatment/Education    Rehab Diagnosis:  left intertrochanteric hip fracture    Active Medical Co-morbidities/Prognosis: afib, anemia, mild CKD    Safety: Patient alert and oriented, makes needs known. Up with assist x1 and walker. Bed alarm on for safety. Bed in low position. Call light within reach.     Pain: Occasional pain I left hip/leg with repositioning. Has scheduled tylenol. PRN oxycodone last used on 3/22.     Medications, Skin, Tubes/Lines: Staples to left hip incision. BUCKY. Some bruising remains around incision. PIV to right arm.     Swallowing/Nutrition:    Bowel/Bladder: Continent of B&B. Use urinal at bedside. LBM: 3/27.     Psychosocial: Pt resides with his SO, Rica. Goal to return home with her continued support. Team working towards a safe discharge plan.     ADLs/IADLs: Pt is SBA for UB cares and MIN A for LB cares. Pt is making progress with mobility using the walker. Need to progress pt to MOD I in room after getting dressed prior to discharge. Anticipate pt will need mIN A for LB dressing at discharge. Did recommend a leg  and reacher. Pt already has a shower chair and RTS and GB. Biggest barrier to discharge are progressing stairs and making sure home has been declutterred enough for safe mobility. At this time it has been reported that the pt has room to fit a walker and side step. Pt will need assist with IADL as well. Recommend HH OT at discharge.     Mobility: Pt continues to participate well and make good progress. Now mod I with FWW during day once dressed, needs assist for dressing and donning shoes and foot up. CGA for stairs using B rails, also single rail with cane to simulate home entry. Family training completed, wife working on making home more  accessible and obtaining DME/AD. Need to continue working on side stepping with cane vs FWW to access narrow spaces within home. Anticipate ready for discharge home Saturday 3/31 with follow up home PT.    Cognition/Language: baseline    Community Re-Entry: close SBA with FWW limited distances, alternatively would recommend transport wc    Transportation: will need assist, family training completed for transfer    Decision maker: self    Plan of Care and goals reviewed and updated.    Discharge Plan/Recommendations    Fall Precautions: continue    Patient/Family input to goals: questions about bladder frequency / urgency. Working on reducing clutter in home. Lots of worries about getting home and falls. Have clutter in home they are working on.    Overall plan for the patient: showing improved function. Improved to advance to up independently once dressed. He's doing stairs but we're recommending ongoing assistance with stairs. Does need some assistance with lower body dressing but ok with upper body dressing and other basic tasks including toileting. On track for discharge home Saturday with in home PT, OT, RN, HHA.      Utilization Review and Continued Stay Justification    Medical Necessity Criteria:    For any criteria that is not met, please document reason and plan for discharge, transfer, or modification of plan of care to address.    Requires intensive rehabilitation program to treat functional deficits?: Yes    Requires 3x per week or greater involvement of rehabilitation physician to oversee rehabilitation program?: Yes    Requires rehabilitation nursing interventions?: Yes    Patient is making functional progress?: Yes    There is a potential for additional functional progress? Yes    Patient is participating in therapy 3 hours per day a minimum of 5 days per week or 15 hours per week in 7 day period?:Yes    Has discharge needs that require coordinated discharge planning approach?:Yes      Final Physician  Sign off    Statement of Approval: I agree with all the recommendations detailed in this document.      Patient Goals       OT goal: hygiene/grooming: modified independent, while standing, using adaptive equipment  OT goal: upper body dressing: Modified independent, including set-up/clothing retrieval  OT goal: lower body dressing: Modified independent, including set-up/clothing retrieval, using adaptive equipment  OT goal: upper body bathing: using adaptive equipment, Supervision/stand-by assist  OT goal: lower body bathing: Supervision/stand-by assist, using adaptive equipment  OT goal: toilet transfer/toileting: Modified independent, cleaning and garment management, toilet transfer, using adaptive equipment  OT goal: meal preparation: Modified independent, with simple meal preparation, ambulatory level, using adaptive equipment  OT goal: home management: Modified independent, with light demand household tasks, ambulatory level, using adaptive equipment  OT goal 1: Pt will demo IND with BUE HEP to increase UB strengthening needed for ADLs and functional mobility.  OT goal 2: Pt will demo SBA with shower transfer utilizing appropriate AE/DME.    PT Frequency: 90 min daily  PT goal: bed mobility: Independent, Supine to/from sit, Bridging - MET  PT goal: transfers: Modified independent, Assistive device(FWW) - MET  PT goal: gait: 100 feet, Rolling walker, Modified independent  PT goal: stairs: Modified independent, Greater than 10 stairs, Rail on both sides  PT goal 1: Pt will perform 3 stairs with 1 railing per home setup.- MET CGA  PT Goal 2: Pt will be educated on falls prevention at home, and be able to verbalize 3 fall prevention strategies.  PT Goal 3: Pt will be able to perform car transfer Mod I with FWW.- needs MIN A  PT Goal 4: Pt will be IND with HEP for BLE strengthening and balance.     Patient Goal:  Pain Management: Pt will demonstrate adequate pain management demonstrated by requesting pain  interventions when needed prior to being offered by nursing.  Goal: Wound Management: Pt will demonstrate adequate wound care knowledge as evidenced by noting any signs of infection and wound care interventions prior to time of discharge.     Goal: Mobility: Pt will demonstrate adequate ambulation ability evidenced by no falls and independent with or without walking device prior to time of discharge.

## 2019-03-28 NOTE — PLAN OF CARE
Pt calling for assistance this AM.  Had Lg bowel movement, continent of bowel & bladder.  Declined metamucil & miralax today d/t having BM this AM.  Scheduled Tylenol given & Lidocaine patch cut into 3 and placed near operative sites on L thigh/hip, which is effective at managing pain.  Once pt is up & ADLs are complete, pt has been approved by OT to be MOD I in the room for toileting.  Due to cognitive & memory deficits, pt should have family manage medications: setup & administration.  R upper arm PIV removed.

## 2019-03-28 NOTE — PROGRESS NOTES
"PM&R Daily Note:    Sha continues to participate nicely in therapy showing in function.  Family training also today working through the details of a plan to discharge home.  Care conference tomorrow to review further but do believe he will be able to navigate the stairs and manage the tasks he will need to with some help from his wife in order to get home.  He was noted walking from bathroom to bed on his own, bathroom call light apparently not functioning\" are placed.  Do feel he is close to being modified independent but for now we will still have some supervision/contact guard assistance.    INR therapeutic again today  Blood pressure is reasonably well controlled but due to some variability.  No change in medications    Vitals:    03/26/19 2024 03/27/19 0534 03/27/19 0743 03/27/19 1632   BP: 131/50  141/54 106/43   BP Location:   Right arm Right arm   Pulse: 62   60   Resp:   17 16   Temp:   95.7  F (35.4  C) 96.7  F (35.9  C)   TempSrc:   Oral Oral   SpO2:   98% 99%   Weight:  65.6 kg (144 lb 9.6 oz)     Height:         Alert, bright affect  States his left leg surgical sites, healing well  Some tight hamstrings on left are noted but also likely some bony restrictions approximate 5 degrees from full extension of the left knee, probably longer standing.      acetaminophen  975 mg Oral Q8H     aspirin  81 mg Oral Daily     atorvastatin  40 mg Oral Daily     calcium carbonate 500 mg (elemental)  500 mg Oral BID w/meals     cyanocobalamin  1,000 mcg Oral BID     doxazosin  1 mg Oral BID     lidocaine  1-2 patch Transdermal Q24H     lidocaine   Transdermal Q8H     lidocaine   Transdermal Q24h     metoprolol tartrate  25 mg Oral BID     mirtazapine  15 mg Oral At Bedtime     multivitamin w/minerals  1 tablet Oral Daily     polyethylene glycol  17 g Oral Daily     psyllium  1 packet Oral Daily     senna-docusate  1-2 tablet Oral BID     sertraline  100 mg Oral QPM     sodium chloride (PF)  3 mL Intracatheter Q8H     " vitamin D3  1,000 Units Oral BID

## 2019-03-28 NOTE — PROGRESS NOTES
Pt is planning to discharge home on Sunday, 3/31 with continued family support. Placed a referral to [facility] for RN, PT, OT, SLP and HHA. Sw will continue to assist as needed.

## 2019-03-28 NOTE — PROGRESS NOTES
PM&R Daily Note:    83-year-old previously active gentleman even playing tennis who had fall on the ice with a left intertrochanteric hip fracture, status post ORIF with IM nail 3/14.  Admitted to inpatient rehab 3/19.    Formal care conference held today. please see separate document in Plan of Care tab for full details. Briefly  showing improved function. Improved to advance to up independently once dressed. He's doing stairs but we're recommending ongoing assistance with stairs. Does need some assistance with lower body dressing but ok with upper body dressing and other basic tasks including toileting. On track for discharge home Sunday with in home PT, OT, RN, HHA.    Cardiac: History of atrial fibrillation, chronically on warfarin which has resumed, INR therapeutic.  Blood pressure and heart rate is reasonably well controlled, continues metoprolol 25 mg twice daily.    Postoperative anemia required transfusion, hemoglobin low 6.8, transfused >> 8.6 >> 8.1>>8.0. No evidence of ongoing bleeding, will monitor hemoglobin periodically.  Other electrolytes: mild hypernatremia resolved. BUN / Cr normalized.    Bladder: Has been having some ongoing frequency.  Had some early postvoid residuals that were mildly elevated between 50 and 120 mL.  Has had a few urinalyses without signs of infection.  He reports he did not really have any bladder difficulties prior to this most recent hospitalization.  He is on doxazosin but that per his recollection is prescribed more for blood pressure/heart though is an alpha-blocker and might help with some BPH contribution to mild urine retention.  I am hesitant to use any anticholinergic for his urgency given his age in combination with mild retention.  Educated about BPH and medications as above.  He has been maintaining continence and for now we will manage as we are.  If he continues to have some bladder concerns, he can follow-up outpatient urology.       Vitals:    03/27/19 0534  03/27/19 0743 03/27/19 1632 03/27/19 2158   BP:  141/54 106/43 137/63   BP Location:  Right arm Right arm    Pulse:   60 62   Resp:  17 16    Temp:  95.7  F (35.4  C) 96.7  F (35.9  C)    TempSrc:  Oral Oral    SpO2:  98% 99%    Weight: 65.6 kg (144 lb 9.6 oz)      Height:         Alert, conversant  Heart irregular, rate controlled  Breathing well  Right arm PIV can be removed.         acetaminophen  975 mg Oral Q8H     aspirin  81 mg Oral Daily     atorvastatin  40 mg Oral Daily     calcium carbonate 500 mg (elemental)  500 mg Oral BID w/meals     cyanocobalamin  1,000 mcg Oral BID     doxazosin  1 mg Oral BID     lidocaine  1-2 patch Transdermal Q24H     lidocaine   Transdermal Q8H     lidocaine   Transdermal Q24h     metoprolol tartrate  25 mg Oral BID     mirtazapine  15 mg Oral At Bedtime     multivitamin w/minerals  1 tablet Oral Daily     polyethylene glycol  17 g Oral Daily     psyllium  1 packet Oral Daily     senna-docusate  1-2 tablet Oral BID     sertraline  100 mg Oral QPM     sodium chloride (PF)  3 mL Intracatheter Q8H     vitamin D3  1,000 Units Oral BID     75 minutes spent today, 60 minutes in direct patient and wife interaction including care conference and after conference discussion especially around bladder and concerns for home discharge and safety., remainder in coordination of care

## 2019-03-29 LAB — INR PPP: 2.24 (ref 0.86–1.14)

## 2019-03-29 PROCEDURE — 97530 THERAPEUTIC ACTIVITIES: CPT | Mod: GO | Performed by: OCCUPATIONAL THERAPIST

## 2019-03-29 PROCEDURE — 25000132 ZZH RX MED GY IP 250 OP 250 PS 637: Performed by: PHYSICAL MEDICINE & REHABILITATION

## 2019-03-29 PROCEDURE — 12800006 ZZH R&B REHAB

## 2019-03-29 PROCEDURE — 97110 THERAPEUTIC EXERCISES: CPT | Mod: GP | Performed by: STUDENT IN AN ORGANIZED HEALTH CARE EDUCATION/TRAINING PROGRAM

## 2019-03-29 PROCEDURE — 36415 COLL VENOUS BLD VENIPUNCTURE: CPT | Performed by: PHYSICAL MEDICINE & REHABILITATION

## 2019-03-29 PROCEDURE — 97535 SELF CARE MNGMENT TRAINING: CPT | Mod: GO | Performed by: STUDENT IN AN ORGANIZED HEALTH CARE EDUCATION/TRAINING PROGRAM

## 2019-03-29 PROCEDURE — 85610 PROTHROMBIN TIME: CPT | Performed by: PHYSICAL MEDICINE & REHABILITATION

## 2019-03-29 PROCEDURE — 97116 GAIT TRAINING THERAPY: CPT | Mod: GP | Performed by: STUDENT IN AN ORGANIZED HEALTH CARE EDUCATION/TRAINING PROGRAM

## 2019-03-29 PROCEDURE — 25000132 ZZH RX MED GY IP 250 OP 250 PS 637

## 2019-03-29 PROCEDURE — 97530 THERAPEUTIC ACTIVITIES: CPT | Mod: GP | Performed by: STUDENT IN AN ORGANIZED HEALTH CARE EDUCATION/TRAINING PROGRAM

## 2019-03-29 PROCEDURE — 97535 SELF CARE MNGMENT TRAINING: CPT | Mod: GO | Performed by: OCCUPATIONAL THERAPIST

## 2019-03-29 RX ORDER — WARFARIN SODIUM 5 MG/1
5 TABLET ORAL
Status: COMPLETED | OUTPATIENT
Start: 2019-03-29 | End: 2019-03-29

## 2019-03-29 RX ORDER — AMOXICILLIN 250 MG
1-2 CAPSULE ORAL 2 TIMES DAILY PRN
COMMUNITY
Start: 2019-03-29 | End: 2019-05-27

## 2019-03-29 RX ORDER — TIZANIDINE 2 MG/1
2 TABLET ORAL EVERY 6 HOURS PRN
Status: DISCONTINUED | OUTPATIENT
Start: 2019-03-29 | End: 2019-03-31 | Stop reason: HOSPADM

## 2019-03-29 RX ORDER — ACETAMINOPHEN 325 MG/1
650-975 TABLET ORAL EVERY 6 HOURS PRN
COMMUNITY
Start: 2019-03-29 | End: 2019-05-27

## 2019-03-29 RX ORDER — WARFARIN SODIUM 5 MG/1
TABLET ORAL
Start: 2019-03-29 | End: 2019-03-31

## 2019-03-29 RX ADMIN — WARFARIN SODIUM 5 MG: 5 TABLET ORAL at 19:09

## 2019-03-29 RX ADMIN — METOPROLOL TARTRATE 25 MG: 25 TABLET, FILM COATED ORAL at 20:30

## 2019-03-29 RX ADMIN — ASPIRIN 81 MG CHEWABLE TABLET 81 MG: 81 TABLET CHEWABLE at 09:40

## 2019-03-29 RX ADMIN — CALCIUM 500 MG: 500 TABLET ORAL at 09:40

## 2019-03-29 RX ADMIN — SENNOSIDES AND DOCUSATE SODIUM 2 TABLET: 8.6; 5 TABLET ORAL at 20:30

## 2019-03-29 RX ADMIN — METOPROLOL TARTRATE 25 MG: 25 TABLET, FILM COATED ORAL at 09:40

## 2019-03-29 RX ADMIN — CYANOCOBALAMIN TAB 1000 MCG 1000 MCG: 1000 TAB at 20:30

## 2019-03-29 RX ADMIN — MIRTAZAPINE 15 MG: 7.5 TABLET ORAL at 22:27

## 2019-03-29 RX ADMIN — DOXAZOSIN MESYLATE 1 MG: 1 TABLET ORAL at 09:40

## 2019-03-29 RX ADMIN — ACETAMINOPHEN 975 MG: 325 TABLET, FILM COATED ORAL at 03:12

## 2019-03-29 RX ADMIN — DOXAZOSIN MESYLATE 1 MG: 1 TABLET ORAL at 20:29

## 2019-03-29 RX ADMIN — CYANOCOBALAMIN TAB 1000 MCG 1000 MCG: 1000 TAB at 09:40

## 2019-03-29 RX ADMIN — ATORVASTATIN CALCIUM 40 MG: 40 TABLET, FILM COATED ORAL at 09:40

## 2019-03-29 RX ADMIN — VITAMIN D, TAB 1000IU (100/BT) 1000 UNITS: 25 TAB at 20:30

## 2019-03-29 RX ADMIN — MULTIPLE VITAMINS W/ MINERALS TAB 1 TABLET: TAB at 09:40

## 2019-03-29 RX ADMIN — SERTRALINE HYDROCHLORIDE 100 MG: 100 TABLET ORAL at 20:30

## 2019-03-29 RX ADMIN — VITAMIN D, TAB 1000IU (100/BT) 1000 UNITS: 25 TAB at 09:40

## 2019-03-29 NOTE — PLAN OF CARE
Pt continues to make good progress with PT. Pt amb up/down 1/2 flight of stairs w/ single handrail and SEC in L UE CGA, mod vcs and mcs for cane placement and upright posture. Trialed 2 steps w/o handrails and SEC use to simulate home entry if handrail is not installed prior to d/c, required Min A x2 d/t pt anxiety and step progression. Pt?s SO confirmed that rail will be installed at home entry prior to dc. Lateral bottom scoots from foot to head of bed to simulate tight space between bed and dresser at home. Will perform full flight of stairs tomorrow as needed to get to bedroom at home. 30 min family training scheduled for [place] 3/31.

## 2019-03-29 NOTE — PROGRESS NOTES
ACACIA asked to call pt SO to confirm transportation plan for Sunday. Pt will discharge home with services and had been evaluated for transfers in and out of vehicle. ACACIA reviewed note and confirmed no other transport had been set up for home transport. ACACIA contacted SO who confirmed she would be picking pt up and feels reassured in pt ability to get in and out of vehicle.    Ady MOORE  Weekend ACACIA

## 2019-03-29 NOTE — DISCHARGE INSTRUCTIONS
Follow up:    -- Follow up ortho with Dr. Avalos as scheduled    -- Several appointments scheduled for primary care, cardiology and arrhythmia clinics as listed.    -- Resume Anticoagulation clinic management of warfarin, previously established through [place]. While getting home care, INR's can be drawn by home RN and forwarded to anticoagulation clinic.    [facility]  [facility] [phone] will provide RN, PT, OT, Speech and Home Health Aide. They will call you after you discharge to schedule the first visit.

## 2019-03-29 NOTE — PROGRESS NOTES
FOCUS/GOAL  Bladder management and Medication management    ASSESSMENT, INTERVENTIONS AND CONTINUING PLAN FOR GOAL:    Patient denies pain this shift. VSS. Encourage fluids. MOD I in room during day. Using urinal to void. Call for assistance when needed. Lidocaine patches on L. Lateral hip removed.  Alarms on at bed time. Continue with POC.

## 2019-03-29 NOTE — PLAN OF CARE
OT: Pt still having incontinence which occasionally requires a full LB clothing change. Came up with a plan of items needed and set up of bathroom so that pt can manage this without needing family assist. Also practiced threading legs into shorts since this is the most difficult part. Therapist called pt's SO and educated her on supplies needed and set up. Also further discussion had regarding AE to be issued at discharge.

## 2019-03-29 NOTE — PLAN OF CARE
FOCUS/GOAL  Mobility    ASSESSMENT, INTERVENTIONS AND CONTINUING PLAN FOR GOAL:  Pt is A&Ox4, denies any pain and reports some numbness to L leg. Pt is MOD I in room w/ walker, raised chair in room to make easier for Pt to stand from chair. Pt had BM this AM and refused senna and miralax, Pt is continent using toilet and bedside urinal. Pt has 2 BUCKY incision on L leg at knee and mid thigh, both had sutures removed, no bleeding or Sx of infection.

## 2019-03-29 NOTE — PLAN OF CARE
FOCUS/GOAL  Medical management    ASSESSMENT, INTERVENTIONS AND CONTINUING PLAN FOR GOAL:  Patient slept of and on. He was found massaging, lifting left leg,when he was asked if he was in pain, he said he has pain only when he moves he leg. Kept legs elevated on pillows. He gets scheduled Tylenol.  He set off bed alarm twice once he was off bed center, the other time he was trying to sit at EOB to use a urinal, he had used the urinal while in bed prior. Urinary frequency, voiding 50 to 100 ml, clear yellow urine.

## 2019-03-30 PROCEDURE — 97530 THERAPEUTIC ACTIVITIES: CPT | Mod: GP

## 2019-03-30 PROCEDURE — 25000132 ZZH RX MED GY IP 250 OP 250 PS 637: Performed by: PHYSICAL MEDICINE & REHABILITATION

## 2019-03-30 PROCEDURE — 97110 THERAPEUTIC EXERCISES: CPT | Mod: GP

## 2019-03-30 PROCEDURE — 97535 SELF CARE MNGMENT TRAINING: CPT | Mod: GO | Performed by: OCCUPATIONAL THERAPIST

## 2019-03-30 PROCEDURE — 12800006 ZZH R&B REHAB

## 2019-03-30 RX ORDER — WARFARIN SODIUM 5 MG/1
5 TABLET ORAL
Status: COMPLETED | OUTPATIENT
Start: 2019-03-30 | End: 2019-03-30

## 2019-03-30 RX ADMIN — METOPROLOL TARTRATE 25 MG: 25 TABLET, FILM COATED ORAL at 08:47

## 2019-03-30 RX ADMIN — DOXAZOSIN MESYLATE 1 MG: 1 TABLET ORAL at 20:18

## 2019-03-30 RX ADMIN — SERTRALINE HYDROCHLORIDE 100 MG: 100 TABLET ORAL at 20:18

## 2019-03-30 RX ADMIN — VITAMIN D, TAB 1000IU (100/BT) 1000 UNITS: 25 TAB at 20:18

## 2019-03-30 RX ADMIN — ACETAMINOPHEN 975 MG: 325 TABLET, FILM COATED ORAL at 20:18

## 2019-03-30 RX ADMIN — SENNOSIDES AND DOCUSATE SODIUM 2 TABLET: 8.6; 5 TABLET ORAL at 20:18

## 2019-03-30 RX ADMIN — MULTIPLE VITAMINS W/ MINERALS TAB 1 TABLET: TAB at 08:47

## 2019-03-30 RX ADMIN — ASPIRIN 81 MG CHEWABLE TABLET 81 MG: 81 TABLET CHEWABLE at 07:02

## 2019-03-30 RX ADMIN — SENNOSIDES AND DOCUSATE SODIUM 1 TABLET: 8.6; 5 TABLET ORAL at 08:47

## 2019-03-30 RX ADMIN — VITAMIN D, TAB 1000IU (100/BT) 1000 UNITS: 25 TAB at 08:47

## 2019-03-30 RX ADMIN — MIRTAZAPINE 15 MG: 7.5 TABLET ORAL at 22:58

## 2019-03-30 RX ADMIN — CYANOCOBALAMIN TAB 1000 MCG 1000 MCG: 1000 TAB at 20:18

## 2019-03-30 RX ADMIN — CYANOCOBALAMIN TAB 1000 MCG 1000 MCG: 1000 TAB at 08:50

## 2019-03-30 RX ADMIN — DOXAZOSIN MESYLATE 1 MG: 1 TABLET ORAL at 08:50

## 2019-03-30 RX ADMIN — ATORVASTATIN CALCIUM 40 MG: 40 TABLET, FILM COATED ORAL at 08:49

## 2019-03-30 RX ADMIN — PSYLLIUM HUSK 1 PACKET: 3.4 POWDER ORAL at 08:50

## 2019-03-30 RX ADMIN — WARFARIN SODIUM 5 MG: 5 TABLET ORAL at 17:06

## 2019-03-30 RX ADMIN — ACETAMINOPHEN 975 MG: 325 TABLET, FILM COATED ORAL at 03:18

## 2019-03-30 RX ADMIN — METOPROLOL TARTRATE 25 MG: 25 TABLET, FILM COATED ORAL at 20:18

## 2019-03-30 RX ADMIN — ACETAMINOPHEN 975 MG: 325 TABLET, FILM COATED ORAL at 11:07

## 2019-03-30 NOTE — PROGRESS NOTES
PM&R Daily Note:    83-year-old previously active gentleman even playing tennis who had fall on the ice with a left intertrochanteric hip fracture, status post ORIF with IM nail 3/14.  Admitted to inpatient rehab 3/19.    Continues to participate nicely in therapies.  To have the home ready with accommodations, we will leave till Sunday morning which is fine.  We will have [place] [employer] tomorrow.  Showing capacity to advance the stairs though he still recommended with assistance.  Ambulating short distances with a walker on his own now . Plan in home PT, OT, RN, HHA.    Cardiac: History of atrial fibrillation (paced with ICD) chronically on warfarin which has resumed, INR therapeutic. Home had 5 mg tabs and usually at 5 mg dose but occasionally with split and take either 2.5 or 7.5 mg doses.  We will finalize with INR discharge recommended warfarin dosing but ideally utilize 5 mg tablets which he has at home.  Blood pressure and heart rate is reasonably well controlled, continues metoprolol 25 mg twice daily.    Postoperative anemia required transfusion, hemoglobin low 6.8, transfused >> 8.6 >> 8.1>>8.0. No evidence of ongoing bleeding, will monitor hemoglobin periodically.  Other electrolytes: mild hypernatremia resolved. BUN / Cr normalized.    Bladder: no change in plan today. Has been having some ongoing frequency.  Had some early postvoid residuals that were mildly elevated between 50 and 120 mL.  Has had a few urinalyses without signs of infection.  He reports he did not really have any bladder difficulties prior to this most recent hospitalization.  He is on doxazosin but that per his recollection is prescribed more for blood pressure/heart though is an alpha-blocker and might help with some BPH contribution to mild urine retention.  I am hesitant to use any anticholinergic for his urgency given his age in combination with mild retention.  Educated about BPH and medications as above.  He has been  maintaining continence and for now we will manage as we are.  If he continues to have some bladder concerns, he can follow-up outpatient urology.       Vitals:    03/28/19 0823 03/28/19 1609 03/28/19 2127 03/29/19 0717   BP: 141/64 103/53 122/51 144/64   BP Location: Left arm Left arm Left arm Left arm   Pulse: 69 60  70   Resp: 16 16  16   Temp: 96.8  F (36  C) 96.8  F (36  C)  96  F (35.6  C)   TempSrc: Oral Oral  Oral   SpO2: 100% 97%  99%   Weight:       Height:         Alert, conversant  Heart (paced) regular, ICD left upper chest  Lungs clear to auscultation  Left leg with staples to surgical sites 1 proximal, 1 distal healing nicely, okay to remove today.  Different Tubigrip style compression on the left leg toes to below knee seems to be more effective at managing swelling.      acetaminophen  975 mg Oral Q8H     aspirin  81 mg Oral Daily     atorvastatin  40 mg Oral Daily     cyanocobalamin  1,000 mcg Oral BID     doxazosin  1 mg Oral BID     metoprolol tartrate  25 mg Oral BID     mirtazapine  15 mg Oral At Bedtime     multivitamin w/minerals  1 tablet Oral Daily     psyllium  1 packet Oral Daily     senna-docusate  1-2 tablet Oral BID     sertraline  100 mg Oral QPM     vitamin D3  1,000 Units Oral BID

## 2019-03-30 NOTE — PLAN OF CARE
FOCUS/GOAL  Bladder management and Medical management    ASSESSMENT, INTERVENTIONS AND CONTINUING PLAN FOR GOAL:  Alert & oriented, uses call light to make needs known. MOD I with walker in room. Continent of bladder with use of toilet, continue dribbling, able to change pull up independently. No BM this shift. Appetite 100% for meals. Denies pain or discomfort. Pleasant & cooperative.

## 2019-03-30 NOTE — DISCHARGE SUMMARY
[facility]   Discharge Summary     Date Admitted: 3/19/2019  Date Discharge: 3/31/2019  Discharge Disposition: Home with support from family    Discharge Diagnosis:   1. Left intertrochanteric proximal femur fracture with impaired mobility and activities of daily living  2. Delirium, resolved  3. Postoperative pain, much improved  4. Coronary artery disease  5. chronic atrial fibrillation on warfarin target INR 2-3  6. Chronic systolic congestive heart failure  7. Hyperlipidemia  8. Colon cancer status post colon resection  9. Bladder urgency/frequency with mild urine retention  10. Acute blood loss anemia, stable-improving    Brief History of Presenting Illness:  This is a 83 year old male admitted to the acute rehabilitation unit on 3/19/2019 after hospitalization related to a fall and associated left intertrochanteric hip fracture.  He underwent ORIF by Dr. Avalos.  Several medical issues as above with some adjustments to his antihypertensives and diuretics.  Some delirium felt associated with opioids trying to minimize.  After stabilization transferred to acute rehab for comprehensive cares  Rehabilitation Course:  Participated nicely in physical and occupational therapies, rehab nursing and close management by physiatry.  Initially slow start but picked up his function and by discharge is ambulating independently with a 2 wheeled walker.  Still some supervision on stairs but is able to navigate those needed to access his home.  His home is identified with significant clutter/hording.  Wife/family working on having travel areas clutter free to minimize any trip hazards.  He is managing his basic activities of daily living independently.  He does have bladder urgency with occasional incontinence but he is demonstrated capacity to change protective undergarments.  Bladder workup as below.  Medically,   Cardiac: History of atrial fibrillation (paced  with ICD) chronically on warfarin which has resumed, INR therapeutic. Home had 5 mg tabs and usually at 5 mg dose but occasionally with split and take either 2.5 or 7.5 mg doses.  Blood pressure and heart rate is reasonably well controlled, continues metoprolol 25 mg twice daily.  Postoperative anemia required transfusion, hemoglobin low 6.8, transfused >> 8.6 >> 8.1>>8.0. No evidence of ongoing bleeding, will monitor hemoglobin periodically.  Other electrolytes: mild hypernatremia resolved. BUN / Cr normalized.  Bladder: Has been having some ongoing frequency.  Had postvoid residuals that were mildly elevated between 50 and 120 mL.  Has had a few urinalyses without signs of infection.  He reports he did not really have any bladder difficulties prior to this most recent hospitalization.  He is on doxazosin but that per his recollection is prescribed more for blood pressure/heart though is an alpha-blocker and might help with some BPH contribution to mild urine retention.  I am hesitant to use any anticholinergic for his urgency given his age in combination with mild retention.  Educated about BPH and medications as above.  He has been maintaining continence and for now we will manage as we are.  If he continues to have some bladder concerns, he can follow-up outpatient urology.      Upon discharge it is recommended to continue with in home PT / OT / RN / HHA     Discharge Medications:   Noe Florence   Home Medication Instructions EDGARD:[id number]    Printed on:03/29/19 2031   Medication Information                      acetaminophen (TYLENOL) 325 MG tablet  Take 2-3 tablets (650-975 mg) by mouth every 6 hours as needed for pain             aspirin 81 MG tablet  Take 1 tablet by mouth daily.             atorvastatin (LIPITOR) 40 MG tablet  Take 1 tablet (40 mg) by mouth daily as directed.             Blood Pressure Monitor KIT  1 Units daily             cholecalciferol 1000 units TABS  Take 1,000 Units by mouth 2  "times daily             cyanocobalamin (VITAMIN B-12) 1000 MCG tablet  Take 1,000 mcg by mouth 2 times daily             doxazosin (CARDURA) 1 MG tablet  Take 1 mg by mouth 2 times daily             emollient (VANICREAM) external cream  Apply topically as needed for other (FEET)             metoprolol tartrate (LOPRESSOR) 25 MG tablet  Take 1 tablet (25 mg) by mouth 2 times daily             mirtazapine (REMERON) 15 MG tablet  Take 15 mg by mouth At Bedtime.             Multiple Vitamins-Minerals (CENTRUM SILVER) per tablet  Take 1 tablet by mouth daily. AM              order for DME  20-30 mm Hg knee length compression stockings.  Measure and fit.  Style and color per patient preference.  Doff n Aracelis per patient need.             psyllium (METAMUCIL/KONSYL) Packet  Take 1 packet by mouth daily Or equivalent 1 teaspoon. Mix with at least 8 oz liquid.             senna-docusate (SENOKOT-S/PERICOLACE) 8.6-50 MG tablet  Take 1-2 tablets by mouth 2 times daily as needed for constipation             sertraline (ZOLOFT) 100 MG tablet  Take 100 mg by mouth every evening.             warfarin (COUMADIN) 5 MG tablet  Adjust dose for target INR 2-3               Warfarin 5 mg every day except Tuesday and Saturday when the dosage is 2.5 mg    Physical Examination:   /45 (BP Location: Left arm)   Pulse 83   Temp 96  F (35.6  C) (Oral)   Resp 16   Ht 1.702 m (5' 7\")   Wt 65.6 kg (144 lb 9.6 oz)   SpO2 98%   BMI 22.65 kg/m      Gen: Elderly gentleman, no acute distress. Alert, oriented and cooperative.  HEENT: Normocephalic, atraumatic  Cardio: rrr  Pulm: No respiratory distress on room air. Lungs clear with deep inspiration/expiration  Abd: Soft, nontender, nondistended  Neuro/MSK: Moves all extremities. Has stsaples in left hip area from surgery. Wound healing well and staples can be removed today prior to discharge home.     Discharge Instructions:  Orders Placed This Encounter      Combination Diet Regular Diet " Adult; Thin Liquids (water, ice chips, juice, milk, gelatin, ice cream, etc)      Diet     Activity: Weightbearing as tolerated.  Should utilize a walker for support with ambulation.  Hip revision on stairs.  Bath bench during showers.  Hold on driving until cleared by surgery.  Left leg surgical incisions can be left open to the air.    Follow up Appointments:  Colorectal surgery April 3  I saw the cardiac visit April 4  Primary care Dr. Sarmiento April 9  Orthopedics Dr. Avalos April 30    Total Discharge time spent is < 30 minutes.     YAW Victoria MD PhD

## 2019-03-30 NOTE — PLAN OF CARE
FOCUS/GOAL  Medication management, Pain management and Medical management    ASSESSMENT, INTERVENTIONS AND CONTINUING PLAN FOR GOAL:  A&O, SBA w/ walker at night. Makes needs known.  Pt reported pain in L leg, given scheduled tylenol.  Continent of B/B, LBM 3/29.

## 2019-03-30 NOTE — PROGRESS NOTES
"  [facility]   Acute Rehabilitation Unit  Daily progress note    interval history  Noe Florence was seen and examined at bedside.     Doing okay today.  States he is having some difficulty getting used to his interim decreased functional independence after hip fracture.  Reports many months of intermittent urinary urgency, no dysuria.      Functionally, seen walking with occupational therapy using front-wheeled walker.  Independent with mobility with front-wheeled walker.  Standby assist with walker at night        medications  Current Facility-Administered Medications   Medication     acetaminophen (TYLENOL) tablet 975 mg     aspirin (ASA) chewable tablet 81 mg     atorvastatin (LIPITOR) tablet 40 mg     cyanocobalamin (VITAMIN B-12) tablet 1,000 mcg     doxazosin (CARDURA) tablet 1 mg     metoprolol tartrate (LOPRESSOR) tablet 25 mg     mirtazapine (REMERON) tablet TABS 15 mg     multivitamin w/minerals (THERA-VIT-M) tablet 1 tablet     Patient is already receiving anticoagulation with heparin, enoxaparin (LOVENOX), warfarin (COUMADIN)  or other anticoagulant medication     psyllium (METAMUCIL/KONSYL) Packet 1 packet     senna-docusate (SENOKOT-S/PERICOLACE) 8.6-50 MG per tablet 1-2 tablet     sertraline (ZOLOFT) tablet 100 mg     tiZANidine (ZANAFLEX) tablet 2 mg     vitamin D3 (CHOLECALCIFEROL) 1000 units (25 mcg) tablet 1,000 Units     Warfarin Therapy Reminder (Check START DATE - warfarin may be starting in the FUTURE)       physical exam  /60   Pulse 81   Temp 97.6  F (36.4  C) (Oral)   Resp 18   Ht 1.702 m (5' 7\")   Wt 65.6 kg (144 lb 9.6 oz)   SpO2 97%   BMI 22.65 kg/m    Gen: Elderly gentleman, no acute distress  HEENT: Normocephalic, atraumatic  Cardio: Regular rhythm, normal rate  Pulm: No respiratory distress on room air  Abd: Soft, nontender, nondistended  Neuro/MSK: Moves all extremities    labs  No new labs today    assessment and " plan    83-year-old previously active gentleman even playing tennis who had fall on the ice with a left intertrochanteric hip fracture, status post ORIF with IM nail 3/14.  Admitted to inpatient rehab 3/19.    Plan is for discharge tomorrow.  Will check with pharmacy for final recommendations on warfarin dosing.    Patient is medically stable, no interval changes to medical plan. Please refer to Dr. Bowden note dated 3/29 for full assessment and plan.        Patient seen and discussed with Dr. Victoria  PM&R Staff Physician    Elizabeth Santos MD  PM&R  Pager: [phone]    I, Dr. Victoria, have seen and examined the patient. I have reviewed the above resident's note and agree with content.  Total time: >15min including chart review, patient examination, discussion of medical issues, and rehab plan.  Time with patient: 10 min    YAW Victoria MD PhD

## 2019-03-30 NOTE — PLAN OF CARE
Physical Therapy Discharge Summary    Reason for therapy discharge:    Discharged to home with home therapy.    Progress towards therapy goal(s). See goals on Care Plan in [facility] electronic health record for goal details.  Goals met    Therapy recommendation(s):    Continued therapy is recommended.  Rationale/Recommendations:  Patient is MOD I with bed mobility with use of leg , does need assist at times when legs elevated on pillows.  Ambulation and transfer with FWW at MOD I, however he has needed supervision at night for increased safety.  At this time recommending supervision on stairs.  He will benefit from continued skilled therapy in the home setting to progress back to IND PLOF.

## 2019-03-30 NOTE — PLAN OF CARE
FOCUS/GOAL  Bladder management and Safety management    ASSESSMENT, INTERVENTIONS AND CONTINUING PLAN FOR GOAL:  Pt is alert and oriented x 4. Continent of bladder via urinal this shift. LAst BM 3/29/19. MOD I in room with walker during that day. SBA with walker at [facility]. Not to be left alone on toilet at [facility] per treatment team. Declined scheduled tylenol. Call light within reach. Pt sitting up in chair watching TV.

## 2019-03-31 VITALS
OXYGEN SATURATION: 98 % | WEIGHT: 144.6 LBS | RESPIRATION RATE: 16 BRPM | HEIGHT: 67 IN | DIASTOLIC BLOOD PRESSURE: 45 MMHG | BODY MASS INDEX: 22.7 KG/M2 | SYSTOLIC BLOOD PRESSURE: 146 MMHG | TEMPERATURE: 96 F | HEART RATE: 83 BPM

## 2019-03-31 PROCEDURE — 25000132 ZZH RX MED GY IP 250 OP 250 PS 637: Performed by: PHYSICAL MEDICINE & REHABILITATION

## 2019-03-31 PROCEDURE — 97530 THERAPEUTIC ACTIVITIES: CPT | Mod: GP

## 2019-03-31 RX ORDER — WARFARIN SODIUM 5 MG/1
TABLET ORAL
Qty: 1 TABLET | Refills: 0 | DISCHARGE
Start: 2019-03-31 | End: 2019-05-27

## 2019-03-31 RX ORDER — WARFARIN SODIUM 5 MG/1
5 TABLET ORAL
Status: DISCONTINUED | OUTPATIENT
Start: 2019-03-31 | End: 2019-03-31 | Stop reason: HOSPADM

## 2019-03-31 RX ADMIN — ACETAMINOPHEN 975 MG: 325 TABLET, FILM COATED ORAL at 11:09

## 2019-03-31 RX ADMIN — SENNOSIDES AND DOCUSATE SODIUM 1 TABLET: 8.6; 5 TABLET ORAL at 09:01

## 2019-03-31 RX ADMIN — METOPROLOL TARTRATE 25 MG: 25 TABLET, FILM COATED ORAL at 09:01

## 2019-03-31 RX ADMIN — PSYLLIUM HUSK 1 PACKET: 3.4 POWDER ORAL at 09:00

## 2019-03-31 RX ADMIN — ATORVASTATIN CALCIUM 40 MG: 40 TABLET, FILM COATED ORAL at 09:01

## 2019-03-31 RX ADMIN — VITAMIN D, TAB 1000IU (100/BT) 1000 UNITS: 25 TAB at 09:01

## 2019-03-31 RX ADMIN — ASPIRIN 81 MG CHEWABLE TABLET 81 MG: 81 TABLET CHEWABLE at 09:01

## 2019-03-31 RX ADMIN — DOXAZOSIN MESYLATE 1 MG: 1 TABLET ORAL at 09:00

## 2019-03-31 RX ADMIN — MULTIPLE VITAMINS W/ MINERALS TAB 1 TABLET: TAB at 08:59

## 2019-03-31 RX ADMIN — ACETAMINOPHEN 975 MG: 325 TABLET, FILM COATED ORAL at 04:38

## 2019-03-31 RX ADMIN — CYANOCOBALAMIN TAB 1000 MCG 1000 MCG: 1000 TAB at 09:00

## 2019-03-31 NOTE — PLAN OF CARE
FOCUS/GOAL  Discharge planning    ASSESSMENT, INTERVENTIONS AND CONTINUING PLAN FOR GOAL:  Pt is alert and oriented x 4. Continent of bladder this shift via urinal. Last BM 3/29/19. Pt still has staples on L upper hip area- left sticky note for MD to follow up prior to patient discharge about staple removal. MOD I in room during day, SBA with walker at [facility]. Regular diet/thin liquids- takes pills whole. Pt C/O LLE swelling, edema 3+ on LLE, no increased warmth or redness around calf. Removed compression stocking for skin integrity and elevated LLE- will continue to monitor, left a sticky note for MD to follow up. Call light within reach, bed alarm on for safety. Planned discharge tomorrow 3/31/19 after family training at 1030.

## 2019-03-31 NOTE — PLAN OF CARE
Pt is alert and oriented x4, denies fever, chills, CP, SOB, N/V, abdominal pain, or new weakness/numbness/tingling, continent of bowel and bladder but at times will spill urinal during use, transferring with stand by assist of 1 and ww overnight, lex during the day, sleeping well throughout the night, no tubes, lines or drains, plan to discharge post family training, sticky note left for physician to have remaining incision staples removed on left flank, no further care concerns at this time continue with POC.

## 2019-03-31 NOTE — PLAN OF CARE
Physical Therapy Discharge Summary    Reason for therapy discharge:    Discharged to home with home therapy.    Progress towards therapy goal(s). See goals on Care Plan in [facility] electronic health record for goal details.  Goals met    Therapy recommendation(s):    Continued therapy is recommended.  Rationale/Recommendations:  Patient has demo good participation in therapy, making nice gains towards his goals.  He is MOD I with bed mobility, transfers and amb with FWW, however has needed help for bed mobility when pillows under his legs and supervision at night.  Recommending supervision for stairs.  He will benefit from skilled therapy in the home setting to progress back to IND PLOF.

## 2019-03-31 NOTE — PLAN OF CARE
FOCUS/GOAL  Pain management, Wound care management and Mobility    ASSESSMENT, INTERVENTIONS AND CONTINUING PLAN FOR GOAL:  Pt reported pain as well managed, denied need for intervention other than scheduled meds. Writer offered to assist with putting on compression stockings, pt declined. New order noted to remove remaining staples from incision, removed as ordered and pt tolerated well. Pt required ao1 with lifting his legs up into the bed when lying down. Discharge orders noted. Discharge instructions, incisional care and s/s of infection, and home medications reviewed with pt and his wife. Questions encouraged and answered, both verbalized understanding of information. Pt escorted to family vehicle via w/c by ARU staff, had all personal belongings on discharge. No medications received from discharge RX.

## 2019-04-01 ENCOUNTER — ANTICOAGULATION THERAPY VISIT (OUTPATIENT)
Dept: ANTICOAGULATION | Facility: CLINIC | Age: 84
End: 2019-04-01

## 2019-04-01 ENCOUNTER — TELEPHONE (OUTPATIENT)
Dept: INTERNAL MEDICINE | Facility: CLINIC | Age: 84
End: 2019-04-01

## 2019-04-01 ENCOUNTER — MEDICAL CORRESPONDENCE (OUTPATIENT)
Dept: HEALTH INFORMATION MANAGEMENT | Facility: CLINIC | Age: 84
End: 2019-04-01

## 2019-04-01 DIAGNOSIS — I48.20 CHRONIC ATRIAL FIBRILLATION (H): ICD-10-CM

## 2019-04-01 NOTE — PROGRESS NOTES
Dates of hospitalization: 3/12 to 3/31  Reason for hospitalization: Lft Femur Fracture   Procedures performed: See above  Vitamin K or FFP administered? Yes  5mg 3/12 and 5mg 3/13 via IV  INR Goal Range Confirmed to be:2.0-3.0  Inpatient warfarin doses added to calendar? Yes  Medication changes at discharge: Continue with previus medications  Warfarin dosing after DC: 2.5mg TuSa and 5mg MWthFSu  Patient discharged on Lovenox? No  Next INR date: Wednesday 4/3  Where is the patient discharging to? (home, TCU, staying locally, etc.): Home with family  Will patient have home care? Yes [facility] [phone]

## 2019-04-01 NOTE — TELEPHONE ENCOUNTER
[facility] Call Center    Phone Message    May a detailed message be left on voicemail: yes    Reason for Call: Order(s): Home Care Orders: Skilled Nursinx a week x 4 skilled nursing home health aid PT/OT Eval and Treat verbal orders are ok to leave in       Action Taken: Message routed to:  [facility] ([facility] [place]

## 2019-04-02 NOTE — PROGRESS NOTES
Colon and Rectal Surgery Postoperative Clinic Note     Referring provider:  Chanelle Guzmán MD  [address] [facility] [address]  [place] [address]       RE: Noe Florence  : 1935  GALA: 4/3/2019      Noe Florence is a very pleasant 83 year old male with significant cardiac history who presents today for postoperative follow up.    History of Present Ilness:    Prior sigmoid colon cancer s/p sigmoid colectomy who developed a new cancer in the transverse colon     Status post laparoscopic converted to open transverse colectomy, lysis of adhesions on 2019.     He saw Cristal Asher NP in clinic on 19.    He was admitted with a femur fracture and underwent ORIF 3/14.       Interval history: The patient left acute rehabilitation 3 days ago and had a difficult overall recovery from the femur fracture but now is over the past 4-5 days doing significantly better.  His one complaint is that he is having difficulty with urination and will pass small amounts of urine every 1-2 hours.  His appetite is good.  He is having 1-2 bowel movements daily.  Bowel movements are formed.  We discussed the pathology which demonstrated a T3 N0 colon cancer.      PLEASE SEE NOTE BELOW FOR PHYSICAL EXAMINATION, REVIEW OF SYSTEMS, AND OTHER HISTORY.    Assessment/Plan:  83 year old male status post laparoscopic converted to open transverse colectomy, lysis of adhesions on 2019.   From a postoperative perspective the patient is doing well.  Patient should follow-up with Dr. Gilliam from medical oncology for ongoing surveillance.  He will need to get colonoscopies every year because of metachronous second colon cancer in only 4 years.  He will see us on an as-needed basis and we will refer him to urology for his urinary symptoms.    This is a postoperative visit. Total time was 15 minutes, over 50% was spent counseling the patient.      PLEASE SEE NOTE BELOW FOR PHYSICAL EXAMINATION, REVIEW OF SYSTEMS,  AND OTHER HISTORY.    Chanelle Guzmán MD  Colon and Rectal Surgery Staff  [facility]    -------------------------------------------------------------------------------------------------------------------    Medical history:  Past Medical History:   Diagnosis Date     Advanced open-angle glaucoma      Antiplatelet or antithrombotic long-term use      Atrial fibrillation (H)      CKD (chronic kidney disease), stage III (H) 2005     Colon cancer (H)     Stage II-B colon cancer     Coronary artery disease     s/p CABG x 2, JEREMY x 2     Diverticulitis      Hyperlipidemia      Hypertension      Ischemic cardiomyopathy      MGUS (monoclonal gammopathy of unknown significance)      Nonsenile cataract     BE     Pacemaker      Polymorphic ventricular tachycardia (H)      Polymyalgia rheumatica (H)      PVD (posterior vitreous detachment), both eyes 2005     S/P ablation of atrial flutter 6/20/14    CTI     Stented coronary artery      SVT (supraventricular tachycardia) (H)     PPM/AICD for NSVT     Upper leg DVT (deep venous thromboembolism), chronic (H)     Left     Weight loss, unintentional 2017    15 lb in 4 months       Surgical history:  Past Surgical History:   Procedure Laterality Date     C CABG, ARTERY-VEIN, FOUR  2006    LIMA-LAD, SVG-Rt PDA, SVG-OM2, SVG-Diag 1     C CABG, VEIN, SINGLE  1994    1-vessel CABG, SVG->PDRCA      CATARACT IOL, RT/LT Bilateral      COLONOSCOPY  3/13/2014    Procedure: COMBINED COLONOSCOPY, SINGLE BIOPSY/POLYPECTOMY BY BIOPSY;;  Surgeon: Mary Gerber MD;  Location:  GI     COLONOSCOPY N/A 6/22/2015    Procedure: COLONOSCOPY;  Surgeon: Marilin Newman MD;  Location:  GI     COLONOSCOPY N/A 11/7/2018    Procedure: COMBINED COLONOSCOPY, SINGLE OR MULTIPLE BIOPSY/POLYPECTOMY BY BIOPSY;  Surgeon: Roberto Esteban MD;  Location:  GI     EP ICD GENERATOR CHANGE DUAL N/A 12/11/2018    Procedure: EP ICD Generator Change Dual;   Surgeon: Deedee Baird MD;  Location:  HEART CARDIAC CATH LAB     H ABLATION ATRIAL FLUTTER       HEART CATH DRUG ELUTING STENT PLACEMENT  4/19/2012    JEREMY x 2 to LCx     IMPLANT IMPLANTABLE CARDIOVERTER DEFIBRILLATOR  5-    ppm/aicd     KNEE SURGERY      right and left knee surgeries     LAPAROSCOPIC ASSISTED COLECTOMY Right 2/1/2019    Procedure: Laparoscopic Converted to Open Transverse Colectomy with Lysis of Adhesions;  Surgeon: Chanelle Guzmán MD;  Location:  OR     LAPAROSCOPIC ASSISTED COLECTOMY LEFT (DESCENDING)  4/8/2014    Procedure: LAPAROSCOPIC ASSISTED COLECTOMY LEFT (DESCENDING);  Hand assisted Laparoscopic Sigmoid Colectomy , Laparoscopic mobilization of spleenic flexure *Latex Allergy*Anesthesia General with Block;  Surgeon: Chanelle Guzmán MD;  Location:  OR     OPEN REDUCTION INTERNAL FIXATION RODDING INTRAMEDULLAR FEMUR FRACTURE TABLE Left 3/14/2019    Procedure: Open Reduction Internal Fixation Left Femur Intramedullar Nailing;  Surgeon: Srikanth Avalos MD;  Location:  OR     REPAIR VALVE MITRAL  2006     30-mm Medtronic Julian ring      SELECTIVE LASER TRABECULOPLASTY (SLT) OD (RIGHT EYE)  4/10, 1/12,+1/9/13    RE     SELECTIVE LASER TRABECULOPLASTY (SLT) OS (LEFT EYE)  5/2012     SHOULDER SURGERY      right rotator cuff       Problem list:    Patient Active Problem List    Diagnosis Date Noted     Hip fracture (H) 03/19/2019     Priority: Medium     Closed left subtrochanteric femur fracture (H) 03/12/2019     Priority: Medium     Tinea pedis of both feet 02/25/2018     Priority: Medium     Venous stasis dermatitis of both lower extremities 02/25/2018     Priority: Medium     Actinic keratosis 02/25/2018     Priority: Medium     Inflamed seborrheic keratosis 02/25/2018     Priority: Medium     Skin infection 11/04/2017     Priority: Medium     CKD (chronic kidney disease), stage III (H)      Priority: Medium     Weight loss, unintentional       Priority: Medium     Ischemic cardiomyopathy 10/20/2016     Priority: Medium     Atrial fibrillation (H) [I48.91] 06/14/2016     Priority: Medium     Long-term (current) use of anticoagulants [Z79.01] 06/14/2016     Priority: Medium     Primary open angle glaucoma 01/11/2015     Priority: Medium     Problem list name updated by automated process. Provider to review       S/P ablation of atrial flutter 06/20/2014     Priority: Medium     Polymyalgia rheumatica (H)      Priority: Medium     Colon cancer (H) 04/08/2014     Priority: Medium     Tinea cruris 08/18/2013     Priority: Medium     Tinea corporis 08/18/2013     Priority: Medium     Skin exam, screening for cancer 08/08/2013     Priority: Medium     Automatic implantable cardioverter-defibrillator - Medtronic dual chamber ICD - Not Dependent 06/27/2012     Priority: Medium     Problem list name updated by automated process. Provider to review       NSVT (nonsustained ventricular tachycardia) (H) 04/20/2012     Priority: Medium     NSTEMI (non-ST elevated myocardial infarction) (H) 04/20/2012     Priority: Medium     CAD (coronary artery disease) 04/18/2012     Priority: Medium     Hypertension      Priority: Medium     Diverticulitis of colon 04/17/2012     Priority: Medium     Partially responded to abx but reflared. Extended course of 2-3 weeks of cipro/flagyl, see d/c summary        Hyperkalemia 04/16/2012     Priority: Medium     2/2 to ARSALAN and Ace-I, once Cr back to baseline resume lisinopril        Anemia 04/16/2012     Priority: Medium     Diarrhea 04/16/2012     Priority: Medium     Rotator cuff (capsule) sprain 03/18/2008     Priority: Medium     Other postprocedural status(V45.89) 03/18/2008     Priority: Medium       Medications:  Current Outpatient Medications   Medication Sig Dispense Refill     acetaminophen (TYLENOL) 325 MG tablet Take 2-3 tablets (650-975 mg) by mouth every 6 hours as needed for pain       aspirin 81 MG tablet Take 1 tablet by  mouth daily.       atorvastatin (LIPITOR) 40 MG tablet Take 1 tablet (40 mg) by mouth daily as directed. 90 tablet 0     Blood Pressure Monitor KIT 1 Units daily 1 kit 0     cholecalciferol 1000 units TABS Take 1,000 Units by mouth 2 times daily       cyanocobalamin (VITAMIN B-12) 1000 MCG tablet Take 1,000 mcg by mouth 2 times daily       doxazosin (CARDURA) 1 MG tablet Take 1 mg by mouth 2 times daily       emollient (VANICREAM) external cream Apply topically as needed for other (FEET)       metoprolol tartrate (LOPRESSOR) 25 MG tablet Take 1 tablet (25 mg) by mouth 2 times daily 180 tablet 3     mirtazapine (REMERON) 15 MG tablet Take 15 mg by mouth At Bedtime.       Multiple Vitamins-Minerals (CENTRUM SILVER) per tablet Take 1 tablet by mouth daily. AM        order for DME 20-30 mm Hg knee length compression stockings.  Measure and fit.  Style and color per patient preference.  Doff n Aracelis per patient need. 1 Units 0     psyllium (METAMUCIL/KONSYL) Packet Take 1 packet by mouth daily Or equivalent 1 teaspoon. Mix with at least 8 oz liquid.       senna-docusate (SENOKOT-S/PERICOLACE) 8.6-50 MG tablet Take 1-2 tablets by mouth 2 times daily as needed for constipation       sertraline (ZOLOFT) 100 MG tablet Take 100 mg by mouth every evening.       warfarin (COUMADIN) 5 MG tablet Adjust dose for target INR 2-3.  2.5 mg every Tuesday and Saturday; 5 mg MWThFSu 1 tablet 0       Allergies:  Allergies   Allergen Reactions     Latex Rash     Rash       Family history:  Family History   Problem Relation Age of Onset     C.A.D. Father      Anesthesia Reaction No family hx of      Crohn's Disease No family hx of      Ulcerative Colitis No family hx of      Cancer - colorectal No family hx of      Macular Degeneration No family hx of      Cancer No family hx of         No known family hx of skin cancer     Melanoma No family hx of      Skin Cancer No family hx of        Social history:  Social History     Socioeconomic  "History     Marital status:      Spouse name: Not on file     Number of children: Not on file     Years of education: Not on file     Highest education level: Not on file   Occupational History     Not on file   Social Needs     Financial resource strain: Not on file     Food insecurity:     Worry: Not on file     Inability: Not on file     Transportation needs:     Medical: Not on file     Non-medical: Not on file   Tobacco Use     Smoking status: Former Smoker     Types: Cigarettes, Cigars     Last attempt to quit: 1968     Years since quittin.2     Smokeless tobacco: Never Used   Substance and Sexual Activity     Alcohol use: Yes     Alcohol/week: 0.0 oz     Comment: 2-3 drinks twice weekly     Drug use: No     Sexual activity: Not on file   Lifestyle     Physical activity:     Days per week: Not on file     Minutes per session: Not on file     Stress: Not on file   Relationships     Social connections:     Talks on phone: Not on file     Gets together: Not on file     Attends Yarsani service: Not on file     Active member of club or organization: Not on file     Attends meetings of clubs or organizations: Not on file     Relationship status: Not on file     Intimate partner violence:     Fear of current or ex partner: Not on file     Emotionally abused: Not on file     Physically abused: Not on file     Forced sexual activity: Not on file   Other Topics Concern     Parent/sibling w/ CABG, MI or angioplasty before 65F 55M? Not Asked   Social History Narrative     Not on file         Nursing Notes:   Baldev Mcmahon EMT  4/3/2019  1:18 PM  Signed  Chief Complaint   Patient presents with     RECHECK       Vitals:    19 1316 19 1317   BP: 97/49 104/55   BP Location: Left arm    Patient Position: Sitting    Cuff Size: Adult Regular    Pulse:  62   Height: 5' 7\"        Body mass index is 22.65 kg/m .      SURYA Barron                         Physical Examination:  /55  " " Pulse 62   Ht 5' 7\"   BMI 22.65 kg/m    General: Pleasant, no acute distress  Sitting in a wheelchair and comfortable.  Abdomen: Well healed midline incision. Soft and nondistended.  Extremities: No edema in bilateral lower extremities    "

## 2019-04-03 ENCOUNTER — ANTICOAGULATION THERAPY VISIT (OUTPATIENT)
Dept: ANTICOAGULATION | Facility: CLINIC | Age: 84
End: 2019-04-03

## 2019-04-03 ENCOUNTER — OFFICE VISIT (OUTPATIENT)
Dept: SURGERY | Facility: CLINIC | Age: 84
End: 2019-04-03
Payer: COMMERCIAL

## 2019-04-03 VITALS
HEART RATE: 62 BPM | BODY MASS INDEX: 22.65 KG/M2 | HEIGHT: 67 IN | SYSTOLIC BLOOD PRESSURE: 104 MMHG | DIASTOLIC BLOOD PRESSURE: 55 MMHG

## 2019-04-03 DIAGNOSIS — Z79.01 ENCOUNTER FOR MONITORING WARFARIN THERAPY: ICD-10-CM

## 2019-04-03 DIAGNOSIS — I48.20 CHRONIC ATRIAL FIBRILLATION (H): ICD-10-CM

## 2019-04-03 DIAGNOSIS — R35.0 URINARY FREQUENCY: Primary | ICD-10-CM

## 2019-04-03 DIAGNOSIS — Z51.81 ENCOUNTER FOR MONITORING WARFARIN THERAPY: ICD-10-CM

## 2019-04-03 LAB — INR PPP: 2.08 (ref 0.86–1.14)

## 2019-04-03 NOTE — PROGRESS NOTES
ANTICOAGULATION FOLLOW-UP CLINIC VISIT    Patient Name:  Noe Florence  Date:  4/3/2019  Contact Type:  Telephone    SUBJECTIVE:        OBJECTIVE    INR   Date Value Ref Range Status   2019 2.08 (H) 0.86 - 1.14 Final       ASSESSMENT / PLAN  No question data found.  Anticoagulation Summary  As of 4/3/2019    INR goal:   2.0-3.0   TTR:   67.0 % (2.7 y)   INR used for dosin.08 (4/3/2019)   Warfarin maintenance plan:   No maintenance plan   Full warfarin instructions:   4/3: 5 mg; 44: 5 mg; 4/5: 5 mg; 6: 2.5 mg; 47: 5 mg; 8: 5 mg   Plan last modified:   Mark Ho, [facility] (3/7/2019)   Next INR check:   2019   Priority:   INR   Target end date:   Indefinite    Indications    Atrial fibrillation (H) [I48.91] [I48.91]  Long-term (current) use of anticoagulants [Z79.01] [Z79.01]             Anticoagulation Episode Summary     INR check location:       Preferred lab:       Send INR reminders to:   [facility]    Comments:   Patient contact number: [phone]   Can leave results with Rica (friend)  [address] [facility] sees pt Ph [phone]      Anticoagulation Care Providers     Provider Role Specialty Phone number    Deedee Baird MD Responsible Cardiology [phone]            See the Encounter Report to view Anticoagulation Flowsheet and Dosing Calendar (Go to Encounters tab in chart review, and find the Anticoagulation Therapy Visit)    Left message for patient with results and dosing recommendations. Asked patient to call back to report any missed doses, falls, signs and symptoms of bleeding or clotting, any changes in health, medication, or diet. Asked patient to call back with any questions or concerns.     Roya Manning RN

## 2019-04-03 NOTE — LETTER
4/3/2019       RE: Noe Florence  [address]  [facility] [address]     Dear Colleague,    Thank you for referring your patient, Noe Florence, to the [facility] COLON AND RECTAL SURGERY at [facility]. Please see a copy of my visit note below.    Colon and Rectal Surgery Postoperative Clinic Note     Referring provider:  Chanelle Guzmán MD  [address] [facility] [address]  [place] [address]       RE: Noe Florence  : 1935  GALA: 4/3/2019      Noe Florence is a very pleasant 83 year old male with significant cardiac history  who presents today for postoperative follow up.    History of Present Ilness:    Prior sigmoid colon cancer s/p sigmoid colectomy who developed a new cancer in the transverse colon     Status post laparoscopic converted to open transverse colectomy, lysis of adhesions on 2019.     He saw Cristal Asher NP in clinic on 19.    He was admitted with a femur fracture and underwent ORIF 3/14.       Interval history: The patient left acute rehabilitation 3 days ago and had a difficult overall recovery from the femur fracture but now is over the past 4-5 days doing significantly better.  His one complaint is that he is having difficulty with urination and will pass small amounts of urine every 1-2 hours.  His appetite is good.  He is having 1-2 bowel movements daily.  Bowel movements are formed.  We discussed the pathology which demonstrated a T3 N0 colon cancer.      PLEASE SEE NOTE BELOW FOR PHYSICAL EXAMINATION, REVIEW OF SYSTEMS, AND OTHER HISTORY.    Assessment/Plan:  83 year old male status post laparoscopic converted to open transverse colectomy, lysis of adhesions on 2019.   From a postoperative perspective the patient is doing well.  Patient should follow-up with Dr. Gilliam from medical oncology for ongoing surveillance.  He will need to get colonoscopies every year because of metachronous second colon  cancer in only 4 years.  He will see us on an as-needed basis and we will refer him to urology for his urinary symptoms.    This is a postoperative visit. Total time was 15 minutes, over 50% was spent counseling the patient.      PLEASE SEE NOTE BELOW FOR PHYSICAL EXAMINATION, REVIEW OF SYSTEMS, AND OTHER HISTORY.    Chanelle Guzmán MD  Colon and Rectal Surgery Staff  [facility]    -------------------------------------------------------------------------------------------------------------------    Medical history:  Past Medical History:   Diagnosis Date     Advanced open-angle glaucoma      Antiplatelet or antithrombotic long-term use      Atrial fibrillation (H)      CKD (chronic kidney disease), stage III (H) 2005     Colon cancer (H)     Stage II-B colon cancer     Coronary artery disease     s/p CABG x 2, JEREMY x 2     Diverticulitis      Hyperlipidemia      Hypertension      Ischemic cardiomyopathy      MGUS (monoclonal gammopathy of unknown significance)      Nonsenile cataract     BE     Pacemaker      Polymorphic ventricular tachycardia (H)      Polymyalgia rheumatica (H)      PVD (posterior vitreous detachment), both eyes 2005     S/P ablation of atrial flutter 6/20/14    CTI     Stented coronary artery      SVT (supraventricular tachycardia) (H)     PPM/AICD for NSVT     Upper leg DVT (deep venous thromboembolism), chronic (H)     Left     Weight loss, unintentional 2017    15 lb in 4 months       Surgical history:  Past Surgical History:   Procedure Laterality Date     C CABG, ARTERY-VEIN, FOUR  2006    LIMA-LAD, SVG-Rt PDA, SVG-OM2, SVG-Diag 1     C CABG, VEIN, SINGLE  1994    1-vessel CABG, SVG->PDRCA      CATARACT IOL, RT/LT Bilateral      COLONOSCOPY  3/13/2014    Procedure: COMBINED COLONOSCOPY, SINGLE BIOPSY/POLYPECTOMY BY BIOPSY;;  Surgeon: Mary Gerber MD;  Location:  GI     COLONOSCOPY N/A 6/22/2015    Procedure: COLONOSCOPY;  Surgeon: Paty  Marilin Valero MD;  Location: [facility] GI     COLONOSCOPY N/A 11/7/2018    Procedure: COMBINED COLONOSCOPY, SINGLE OR MULTIPLE BIOPSY/POLYPECTOMY BY BIOPSY;  Surgeon: Roberto Esteban MD;  [facility]: [facility] GI     EP ICD GENERATOR CHANGE DUAL N/A 12/11/2018    Procedure: EP ICD Generator Change Dual;  Surgeon: Deedee Baird MD;  Location:  HEART CARDIAC CATH LAB     H ABLATION ATRIAL FLUTTER       HEART CATH DRUG ELUTING STENT PLACEMENT  4/19/2012    JEREMY x 2 to LCx     IMPLANT IMPLANTABLE CARDIOVERTER DEFIBRILLATOR  5-    ppm/aicd     KNEE SURGERY      right and left knee surgeries     LAPAROSCOPIC ASSISTED COLECTOMY Right 2/1/2019    Procedure: Laparoscopic Converted to Open Transverse Colectomy with Lysis of Adhesions;  Surgeon: Chanelle Guzmán MD;  Location:  OR     LAPAROSCOPIC ASSISTED COLECTOMY LEFT (DESCENDING)  4/8/2014    Procedure: LAPAROSCOPIC ASSISTED COLECTOMY LEFT (DESCENDING);  Hand assisted Laparoscopic Sigmoid Colectomy , Laparoscopic mobilization of spleenic flexure *Latex Allergy*Anesthesia General with Block;  Surgeon: Chanelle Guzmán MD;  [facility] OR     OPEN REDUCTION INTERNAL FIXATION RODDING INTRAMEDULLAR FEMUR FRACTURE TABLE Left 3/14/2019    Procedure: Open Reduction Internal Fixation Left Femur Intramedullar Nailing;  Surgeon: Srikanth Avalos MD;  Location:  OR     REPAIR VALVE MITRAL  2006     30-mm Medtronic Julian ring      SELECTIVE LASER TRABECULOPLASTY (SLT) OD (RIGHT EYE)  4/10, 1/12,+1/9/13    RE     SELECTIVE LASER TRABECULOPLASTY (SLT) OS (LEFT EYE)  5/2012     SHOULDER SURGERY      right rotator cuff       Problem list:    Patient Active Problem List    Diagnosis Date Noted     Hip fracture (H) 03/19/2019     Priority: Medium     Closed left subtrochanteric femur fracture (H) 03/12/2019     Priority: Medium     Tinea pedis of both feet 02/25/2018     Priority: Medium     Venous stasis dermatitis of both lower extremities 02/25/2018      Priority: Medium     Actinic keratosis 02/25/2018     Priority: Medium     Inflamed seborrheic keratosis 02/25/2018     Priority: Medium     Skin infection 11/04/2017     Priority: Medium     CKD (chronic kidney disease), stage III (H)      Priority: Medium     Weight loss, unintentional      Priority: Medium     Ischemic cardiomyopathy 10/20/2016     Priority: Medium     Atrial fibrillation (H) [I48.91] 06/14/2016     Priority: Medium     Long-term (current) use of anticoagulants [Z79.01] 06/14/2016     Priority: Medium     Primary open angle glaucoma 01/11/2015     Priority: Medium     Problem list name updated by automated process. Provider to review       S/P ablation of atrial flutter 06/20/2014     Priority: Medium     Polymyalgia rheumatica (H)      Priority: Medium     Colon cancer (H) 04/08/2014     Priority: Medium     Tinea cruris 08/18/2013     Priority: Medium     Tinea corporis 08/18/2013     Priority: Medium     Skin exam, screening for cancer 08/08/2013     Priority: Medium     Automatic implantable cardioverter-defibrillator - Medtronic dual chamber ICD - Not Dependent 06/27/2012     Priority: Medium     Problem list name updated by automated process. Provider to review       NSVT (nonsustained ventricular tachycardia) (H) 04/20/2012     Priority: Medium     NSTEMI (non-ST elevated myocardial infarction) (H) 04/20/2012     Priority: Medium     CAD (coronary artery disease) 04/18/2012     Priority: Medium     Hypertension      Priority: Medium     Diverticulitis of colon 04/17/2012     Priority: Medium     Partially responded to abx but reflared. Extended course of 2-3 weeks of cipro/flagyl, see d/c summary        Hyperkalemia 04/16/2012     Priority: Medium     2/2 to ARSALAN and Ace-I, once Cr back to baseline resume lisinopril        Anemia 04/16/2012     Priority: Medium     Diarrhea 04/16/2012     Priority: Medium     Rotator cuff (capsule) sprain 03/18/2008     Priority: Medium     Other  postprocedural status(V45.89) 03/18/2008     Priority: Medium       Medications:  Current Outpatient Medications   Medication Sig Dispense Refill     acetaminophen (TYLENOL) 325 MG tablet Take 2-3 tablets (650-975 mg) by mouth every 6 hours as needed for pain       aspirin 81 MG tablet Take 1 tablet by mouth daily.       atorvastatin (LIPITOR) 40 MG tablet Take 1 tablet (40 mg) by mouth daily as directed. 90 tablet 0     Blood Pressure Monitor KIT 1 Units daily 1 kit 0     cholecalciferol 1000 units TABS Take 1,000 Units by mouth 2 times daily       cyanocobalamin (VITAMIN B-12) 1000 MCG tablet Take 1,000 mcg by mouth 2 times daily       doxazosin (CARDURA) 1 MG tablet Take 1 mg by mouth 2 times daily       emollient (VANICREAM) external cream Apply topically as needed for other (FEET)       metoprolol tartrate (LOPRESSOR) 25 MG tablet Take 1 tablet (25 mg) by mouth 2 times daily 180 tablet 3     mirtazapine (REMERON) 15 MG tablet Take 15 mg by mouth At Bedtime.       Multiple Vitamins-Minerals (CENTRUM SILVER) per tablet Take 1 tablet by mouth daily. AM        order for DME 20-30 mm Hg knee length compression stockings.  Measure and fit.  Style and color per patient preference.  Doff n Aracelis per patient need. 1 Units 0     psyllium (METAMUCIL/KONSYL) Packet Take 1 packet by mouth daily Or equivalent 1 teaspoon. Mix with at least 8 oz liquid.       senna-docusate (SENOKOT-S/PERICOLACE) 8.6-50 MG tablet Take 1-2 tablets by mouth 2 times daily as needed for constipation       sertraline (ZOLOFT) 100 MG tablet Take 100 mg by mouth every evening.       warfarin (COUMADIN) 5 MG tablet Adjust dose for target INR 2-3.  2.5 mg every Tuesday and Saturday; 5 mg MWThFSu 1 tablet 0       Allergies:  Allergies   Allergen Reactions     Latex Rash     Rash       Family history:  Family History   Problem Relation Age of Onset     C.A.D. Father      Anesthesia Reaction No family hx of      Crohn's Disease No family hx of       Ulcerative Colitis No family hx of      Cancer - colorectal No family hx of      Macular Degeneration No family hx of      Cancer No family hx of         No known family hx of skin cancer     Melanoma No family hx of      Skin Cancer No family hx of        Social history:  Social History     Socioeconomic History     Marital status:      Spouse name: Not on file     Number of children: Not on file     Years of education: Not on file     Highest education level: Not on file   Occupational History     Not on file   Social Needs     Financial resource strain: Not on file     Food insecurity:     Worry: Not on file     Inability: Not on file     Transportation needs:     Medical: Not on file     Non-medical: Not on file   Tobacco Use     Smoking status: Former Smoker     Types: Cigarettes, Cigars     Last attempt to quit: 1968     Years since quittin.2     Smokeless tobacco: Never Used   Substance and Sexual Activity     Alcohol use: Yes     Alcohol/week: 0.0 oz     Comment: 2-3 drinks twice weekly     Drug use: No     Sexual activity: Not on file   Lifestyle     Physical activity:     Days per week: Not on file     Minutes per session: Not on file     Stress: Not on file   Relationships     Social connections:     Talks on phone: Not on file     Gets together: Not on file     Attends [employer] service: Not on file     Active member of club or organization: Not on file     Attends meetings of clubs or organizations: Not on file     Relationship status: Not on file     Intimate partner violence:     Fear of current or ex partner: Not on file     Emotionally abused: Not on file     Physically abused: Not on file     Forced sexual activity: Not on file   Other Topics Concern     Parent/sibling w/ CABG, MI or angioplasty before 65F 55M? Not Asked   Social History Narrative     Not on file         Nursing Notes:   Baldev Mcmahon, EMT  4/3/2019  1:18 PM  Signed  Chief Complaint   Patient presents with      "RECHECK       Vitals:    04/03/19 1316 04/03/19 1317   BP: 97/49 104/55   BP Location: Left arm    Patient Position: Sitting    Cuff Size: Adult Regular    Pulse:  62   Height: 5' 7\"        Body mass index is 22.65 kg/m .      Baldev Mcmahon, EMT       Physical Examination:  /55   Pulse 62   Ht 5' 7\"   BMI 22.65 kg/m     General: Pleasant, no acute distress  Sitting in a wheelchair and comfortable.  Abdomen: Well healed midline incision. Soft and nondistended.  Extremities: No edema in bilateral lower extremities      Again, thank you for allowing me to participate in the care of your patient.      Sincerely,    Chanelle Guzmán MD      "

## 2019-04-03 NOTE — PATIENT INSTRUCTIONS
Follow up:  1. Follow Up with Urology     Please call with any questions or concerns regarding your clinic visit today.    It is a pleasure being involved in your health care.    Contacts post-consultation depending on your need:    Radiology Appointments [phone]    Schedule Clinic Appointments [phone] # 1   M-F 7:30 - 5 pm    [facility] [phone]    Clinic Fax Number [phone]    Surgery Scheduling [phone]    My Chart is available 24 hours a day and is a secure way to access your records and communicate with your care team.  I strongly recommend signing up if you haven't already done so, if you are comfortable with computers.  If you would like to inquire about this or are having problems with My Chart access, you may call [phone] or go online at [email or web address].  Please allow at least 24 hours for a response and extra time on weekends and Holidays.

## 2019-04-03 NOTE — NURSING NOTE
"Chief Complaint   Patient presents with     RECHECK       Vitals:    04/03/19 1316 04/03/19 1317   BP: 97/49 104/55   BP Location: Left arm    Patient Position: Sitting    Cuff Size: Adult Regular    Pulse:  62   Height: 5' 7\"        Body mass index is 22.65 kg/m .      Baldev Mcmahon, EMT                      "

## 2019-04-05 DIAGNOSIS — I25.10 CORONARY ARTERY DISEASE INVOLVING NATIVE HEART WITHOUT ANGINA PECTORIS, UNSPECIFIED VESSEL OR LESION TYPE: ICD-10-CM

## 2019-04-08 ENCOUNTER — TELEPHONE (OUTPATIENT)
Dept: INTERNAL MEDICINE | Facility: CLINIC | Age: 84
End: 2019-04-08

## 2019-04-08 DIAGNOSIS — E78.5 HYPERLIPIDEMIA, UNSPECIFIED HYPERLIPIDEMIA TYPE: ICD-10-CM

## 2019-04-08 RX ORDER — METOPROLOL TARTRATE 25 MG/1
25 TABLET, FILM COATED ORAL 2 TIMES DAILY
Qty: 180 TABLET | Refills: 3 | Status: SHIPPED | OUTPATIENT
Start: 2019-04-08 | End: 2019-04-09

## 2019-04-08 NOTE — TELEPHONE ENCOUNTER
[facility] Call Center    Phone Message    May a detailed message be left on voicemail: yes    Reason for Call: Order(s): Home Care Orders: Physical Therapy (PT): Requesting 2x a week for 4 weeks. Please leave vm if Taylor does not answer.    Action Taken: Message routed to:  [facility] ([facility] [place]

## 2019-04-09 ENCOUNTER — PRE VISIT (OUTPATIENT)
Dept: UROLOGY | Facility: CLINIC | Age: 84
End: 2019-04-09

## 2019-04-09 ENCOUNTER — OFFICE VISIT (OUTPATIENT)
Dept: INTERNAL MEDICINE | Facility: CLINIC | Age: 84
End: 2019-04-09
Payer: COMMERCIAL

## 2019-04-09 ENCOUNTER — ANTICOAGULATION THERAPY VISIT (OUTPATIENT)
Dept: ANTICOAGULATION | Facility: CLINIC | Age: 84
End: 2019-04-09

## 2019-04-09 VITALS
HEART RATE: 63 BPM | SYSTOLIC BLOOD PRESSURE: 95 MMHG | WEIGHT: 140.8 LBS | RESPIRATION RATE: 16 BRPM | DIASTOLIC BLOOD PRESSURE: 60 MMHG | BODY MASS INDEX: 22.05 KG/M2

## 2019-04-09 DIAGNOSIS — R63.4 WEIGHT LOSS: Primary | ICD-10-CM

## 2019-04-09 DIAGNOSIS — I25.10 CORONARY ARTERY DISEASE INVOLVING NATIVE HEART WITHOUT ANGINA PECTORIS, UNSPECIFIED VESSEL OR LESION TYPE: ICD-10-CM

## 2019-04-09 DIAGNOSIS — R35.0 URINARY FREQUENCY: Primary | ICD-10-CM

## 2019-04-09 DIAGNOSIS — D64.9 ANEMIA, UNSPECIFIED TYPE: ICD-10-CM

## 2019-04-09 DIAGNOSIS — Z51.81 ENCOUNTER FOR MONITORING WARFARIN THERAPY: ICD-10-CM

## 2019-04-09 DIAGNOSIS — R63.4 WEIGHT LOSS: ICD-10-CM

## 2019-04-09 DIAGNOSIS — C18.9 MALIGNANT NEOPLASM OF COLON, UNSPECIFIED PART OF COLON (H): ICD-10-CM

## 2019-04-09 DIAGNOSIS — I48.91 ATRIAL FIBRILLATION (H): ICD-10-CM

## 2019-04-09 DIAGNOSIS — I48.20 CHRONIC ATRIAL FIBRILLATION (H): ICD-10-CM

## 2019-04-09 DIAGNOSIS — Z79.01 ENCOUNTER FOR MONITORING WARFARIN THERAPY: ICD-10-CM

## 2019-04-09 DIAGNOSIS — R35.0 URINARY FREQUENCY: ICD-10-CM

## 2019-04-09 LAB
ALBUMIN SERPL-MCNC: 3.4 G/DL (ref 3.4–5)
ALP SERPL-CCNC: 181 U/L (ref 40–150)
ALT SERPL W P-5'-P-CCNC: 22 U/L (ref 0–70)
ANION GAP SERPL CALCULATED.3IONS-SCNC: 5 MMOL/L (ref 3–14)
AST SERPL W P-5'-P-CCNC: 16 U/L (ref 0–45)
BASOPHILS # BLD AUTO: 0 10E9/L (ref 0–0.2)
BASOPHILS NFR BLD AUTO: 0.3 %
BILIRUB SERPL-MCNC: 0.4 MG/DL (ref 0.2–1.3)
BUN SERPL-MCNC: 53 MG/DL (ref 7–30)
CALCIUM SERPL-MCNC: 9.5 MG/DL (ref 8.5–10.1)
CHLORIDE SERPL-SCNC: 111 MMOL/L (ref 94–109)
CO2 SERPL-SCNC: 25 MMOL/L (ref 20–32)
CREAT SERPL-MCNC: 1.25 MG/DL (ref 0.66–1.25)
DIFFERENTIAL METHOD BLD: ABNORMAL
EOSINOPHIL # BLD AUTO: 0.2 10E9/L (ref 0–0.7)
EOSINOPHIL NFR BLD AUTO: 2.3 %
ERYTHROCYTE [DISTWIDTH] IN BLOOD BY AUTOMATED COUNT: 16.8 % (ref 10–15)
GFR SERPL CREATININE-BSD FRML MDRD: 53 ML/MIN/{1.73_M2}
GLUCOSE SERPL-MCNC: 101 MG/DL (ref 70–99)
HCT VFR BLD AUTO: 30.2 % (ref 40–53)
HGB BLD-MCNC: 9 G/DL (ref 13.3–17.7)
IMM GRANULOCYTES # BLD: 0 10E9/L (ref 0–0.4)
IMM GRANULOCYTES NFR BLD: 0.3 %
INR PPP: 1.69 (ref 0.86–1.14)
LYMPHOCYTES # BLD AUTO: 0.7 10E9/L (ref 0.8–5.3)
LYMPHOCYTES NFR BLD AUTO: 9.2 %
MCH RBC QN AUTO: 28 PG (ref 26.5–33)
MCHC RBC AUTO-ENTMCNC: 29.8 G/DL (ref 31.5–36.5)
MCV RBC AUTO: 94 FL (ref 78–100)
MONOCYTES # BLD AUTO: 0.8 10E9/L (ref 0–1.3)
MONOCYTES NFR BLD AUTO: 10.5 %
NEUTROPHILS # BLD AUTO: 5.8 10E9/L (ref 1.6–8.3)
NEUTROPHILS NFR BLD AUTO: 77.4 %
NRBC # BLD AUTO: 0 10*3/UL
NRBC BLD AUTO-RTO: 0 /100
PLATELET # BLD AUTO: 246 10E9/L (ref 150–450)
POTASSIUM SERPL-SCNC: 4.8 MMOL/L (ref 3.4–5.3)
PROT SERPL-MCNC: 7.2 G/DL (ref 6.8–8.8)
RBC # BLD AUTO: 3.21 10E12/L (ref 4.4–5.9)
SODIUM SERPL-SCNC: 141 MMOL/L (ref 133–144)
WBC # BLD AUTO: 7.5 10E9/L (ref 4–11)

## 2019-04-09 RX ORDER — METOPROLOL TARTRATE 25 MG/1
12.5 TABLET, FILM COATED ORAL 2 TIMES DAILY
Qty: 180 TABLET | Refills: 3 | COMMUNITY
Start: 2019-04-09 | End: 2020-06-03

## 2019-04-09 RX ORDER — DOXAZOSIN 1 MG/1
1 TABLET ORAL AT BEDTIME
COMMUNITY
Start: 2019-04-09 | End: 2019-04-10 | Stop reason: ALTCHOICE

## 2019-04-09 RX ORDER — ATORVASTATIN CALCIUM 40 MG/1
40 TABLET, FILM COATED ORAL DAILY
Qty: 90 TABLET | Refills: 0 | Status: SHIPPED | OUTPATIENT
Start: 2019-04-09 | End: 2019-05-15

## 2019-04-09 NOTE — PATIENT INSTRUCTIONS
[facility] Medication Refill Request Information:  * Please contact your pharmacy regarding ANY request for medication refills.  ** [facility] Prescription Fax = [phone]  * Please allow 3 business days for routine medication refills.  * Please allow 5 business days for controlled substance medication refills.     [facility] Test Result notification information:  *You will be notified with in 7-10 days of your appointment day regarding the results of your test.  If you are on MyChart you will be notified as soon as the provider has reviewed the results and signed off on them.    [facility]: [phone]

## 2019-04-09 NOTE — TELEPHONE ENCOUNTER
MEDICAL RECORDS REQUEST   [facility]  Urology Clinic  [address] [place] [address]  PHONE: [phone]  Fax: [phone]        FUTURE VISIT INFORMATION                                                   Noe Florence, : 1935 scheduled for future visit at [facility]    APPOINTMENT INFORMATION:    Date: 04/10/2019    Provider:  BREE DODGE    Reason for Visit/Diagnosis: URINARY RETENTION    REFERRAL INFORMATION:    Referring provider:  DR SULTANA    Specialty: MD    Referring providers clinic:  [facility]    Clinic contact number:  [phone]    RECORDS REQUESTED FOR VISIT                                                     NOTES  STATUS/DETAILS   OFFICE NOTE from referring provider  yes   OFFICE NOTE from other specialist  no   DISCHARGE SUMMARY from hospital  no   DISCHARGE REPORT from the ER  no   OPERATIVE REPORT  no   MEDICATION LIST  yes       PRE-VISIT CHECKLIST      Record collection complete Yes   Appointment appropriately scheduled           (right time/right provider) Yes   MyChart activation Yes   Questionnaire complete If no, please explain IN PROCESS     Completed by: Meryl Bustos

## 2019-04-09 NOTE — PROGRESS NOTES
HPI  83-year-old presents today with his wife after discharge from the hospital and transitional care from a hip fracture.  He was getting home services and home physical therapy but has not been actively participating a lot of physical activity or walking.  He has had no dizziness or lightheadedness by his report at all.  He did have significant anemia associated with this requiring several transfusions.  He denies any black stools or bloody stools but he has had issues with urinary frequency and nocturia especially since his discharge.  He has had no dysuria or burning with this he is been using the Cardura twice a day without any significant benefit.  He is scheduled for urologic assessment at this.  Otherwise he denies any chest pain or dyspnea.  His appetite has been somewhat poor and supplementing this with nutritional supplements but his weight is down slightly.  Past Medical History:   Diagnosis Date     Advanced open-angle glaucoma      Antiplatelet or antithrombotic long-term use      Atrial fibrillation (H)      CKD (chronic kidney disease), stage III (H) 2005     Colon cancer (H)     Stage II-B colon cancer     Coronary artery disease     s/p CABG x 2, JEREMY x 2     Diverticulitis      Hyperlipidemia      Hypertension      Ischemic cardiomyopathy      MGUS (monoclonal gammopathy of unknown significance)      Nonsenile cataract     BE     Pacemaker      Polymorphic ventricular tachycardia (H)      Polymyalgia rheumatica (H)      PVD (posterior vitreous detachment), both eyes 2005     S/P ablation of atrial flutter 6/20/14    CTI     Stented coronary artery      SVT (supraventricular tachycardia) (H)     PPM/AICD for NSVT     Upper leg DVT (deep venous thromboembolism), chronic (H)     Left     Weight loss, unintentional 2017    15 lb in 4 months     Past Surgical History:   Procedure Laterality Date     C CABG, ARTERY-VEIN, FOUR  2006    LIMA-LAD, SVG-Rt PDA, SVG-OM2, SVG-Diag 1     C CABG, VEIN, SINGLE   1994    1-vessel CABG, SVG->PDRCA      CATARACT IOL, RT/LT Bilateral      COLONOSCOPY  3/13/2014    Procedure: COMBINED COLONOSCOPY, SINGLE BIOPSY/POLYPECTOMY BY BIOPSY;;  Surgeon: Mary Gerber MD;  Location:  GI     COLONOSCOPY N/A 6/22/2015    Procedure: COLONOSCOPY;  Surgeon: Marilin Newman MD;  Location:  GI     COLONOSCOPY N/A 11/7/2018    Procedure: COMBINED COLONOSCOPY, SINGLE OR MULTIPLE BIOPSY/POLYPECTOMY BY BIOPSY;  Surgeon: Roberto Esteban MD;  Location[facility]     EP ICD GENERATOR CHANGE DUAL N/A 12/11/2018    Procedure: EP ICD Generator Change Dual;  Surgeon: Deedee Baird MD;  Location:  HEART CARDIAC CATH LAB     H ABLATION ATRIAL FLUTTER       HEART CATH DRUG ELUTING STENT PLACEMENT  4/19/2012    JEREMY x 2 to LCx     IMPLANT IMPLANTABLE CARDIOVERTER DEFIBRILLATOR  5-    ppm/aicd     KNEE SURGERY      right and left knee surgeries     LAPAROSCOPIC ASSISTED COLECTOMY Right 2/1/2019    Procedure: Laparoscopic Converted to Open Transverse Colectomy with Lysis of Adhesions;  Surgeon: Chanelle Guzmán MD;  Location:  OR     LAPAROSCOPIC ASSISTED COLECTOMY LEFT (DESCENDING)  4/8/2014    Procedure: LAPAROSCOPIC ASSISTED COLECTOMY LEFT (DESCENDING);  Hand assisted Laparoscopic Sigmoid Colectomy , Laparoscopic mobilization of spleenic flexure *Latex Allergy*Anesthesia General with Block;  Surgeon: Chanelle Guzmán MD;  Location: [facility] OR     OPEN REDUCTION INTERNAL FIXATION RODDING INTRAMEDULLAR FEMUR FRACTURE TABLE Left 3/14/2019    Procedure: Open Reduction Internal Fixation Left Femur Intramedullar Nailing;  Surgeon: Srikanth Avalos MD;  Location:  OR     REPAIR VALVE MITRAL  2006     30-mm Medtronic Julian ring      SELECTIVE LASER TRABECULOPLASTY (SLT) OD (RIGHT EYE)  4/10, 1/12,+1/9/13    RE     SELECTIVE LASER TRABECULOPLASTY (SLT) OS (LEFT EYE)  5/2012     SHOULDER SURGERY      right rotator cuff     Family History   Problem Relation  Age of Onset     C.A.D. Father      Anesthesia Reaction No family hx of      Crohn's Disease No family hx of      Ulcerative Colitis No family hx of      Cancer - colorectal No family hx of      Macular Degeneration No family hx of      Cancer No family hx of         No known family hx of skin cancer     Melanoma No family hx of      Skin Cancer No family hx of      Social History     Socioeconomic History     Marital status:      Spouse name: Not on file     Number of children: Not on file     Years of education: Not on file     Highest education level: Not on file   Occupational History     Not on file   Social Needs     Financial resource strain: Not on file     Food insecurity:     Worry: Not on file     Inability: Not on file     Transportation needs:     Medical: Not on file     Non-medical: Not on file   Tobacco Use     Smoking status: Former Smoker     Types: Cigarettes, Cigars     Last attempt to quit: 1968     Years since quittin.3     Smokeless tobacco: Never Used   Substance and Sexual Activity     Alcohol use: Yes     Alcohol/week: 0.0 oz     Comment: 2-3 drinks twice weekly     Drug use: No     Sexual activity: Not on file   Lifestyle     Physical activity:     Days per week: Not on file     Minutes per session: Not on file     Stress: Not on file   Relationships     Social connections:     Talks on phone: Not on file     Gets together: Not on file     Attends [employer] service: Not on file     Active member of club or organization: Not on file     Attends meetings of clubs or organizations: Not on file     Relationship status: Not on file     Intimate partner violence:     Fear of current or ex partner: Not on file     Emotionally abused: Not on file     Physically abused: Not on file     Forced sexual activity: Not on file   Other Topics Concern     Parent/sibling w/ CABG, MI or angioplasty before 65F 55M? Not Asked   Social History Narrative     Not on file       Exam:  BP 95/60 (BP  Location: Right arm, Patient Position: Sitting, Cuff Size: Adult Regular)   Pulse 63   Resp 16   Wt 63.9 kg (140 lb 12.8 oz)   BMI 22.05 kg/m    140 lbs 12.8 oz  The patient is alert, oriented with a clear sensorium.   Skin shows no lesions or rashes and good turgor.   Head is normocephalic and atraumatic.    Neck shows no nodes, thyromegaly.     Lungs are clear.   Heart shows normal S1 and S2 without murmur or gallop.    Extremities show no edema.    ASSESSMENT  1 status post hip fracture doing well  2 anemia related to blood loss needs reassessment  3 urinary frequency and urgency rule out infection  4 weight loss  5 hypotension asymptomatic  6 remote history of polymyalgia  7 coronary artery disease status post stenting asymptomatic  8 history of colon cancer  9 history of atrial fibrillation    Plan  Encouraged him regarding his activity and exercise and to increase his walking.  But I have him use nutritional supplements and I gave him some samples of boost.  Recheck his UA CBC and BMP today.  We will reduce his Cardura down to once at bedtime it seems to be ineffective for his urinary symptoms.  Also reduce his metoprolol down to 12.5 twice daily.  He has multiple follow-up appointments scheduled in the next month and I will plan to see him back in 6-8 weeks or sooner if any increased symptoms or problems  Over 25 minutes spent with patient the majority in counseling and coordinating care.    This note was completed using Dragon voice recognition software.  Although reviewed after completion, some word and grammatical errors may occur.    Roberto Sarmiento MD  General Internal Medicine  [facility]  [phone]

## 2019-04-09 NOTE — TELEPHONE ENCOUNTER
Last Clinic Visit: 4/9/2019  [facility]    Lab(s) past due-LDL.  Rachel refill 90 days and FYI to clinic CMA.

## 2019-04-09 NOTE — PROGRESS NOTES
Chief Complaint:   Urinary frequency          History of Present Illness:   Noe Florence is a very pleasant 83 year old male who presents today with his significant other to discuss his urinary frequency and urgency. Per chart review, he suffered a femur fracture from a fall on the ice, resulting in hospitalization on 3/12/19, surgical repair 3/14/19, and discharged to transitional care 3/19/19. He has since gone home, and has been getting home care services and PT. However, he has been experiencing urinary frequency and nocturia since his discharge from the hospital. He met with Dr. Sarmiento of primary care yesterday, who ordered a urinalysis in response to these symptoms. This was completed earlier today and was negative for acute infection. He is currently taking doxazosin, which was just decreased from BID to once daily in the evening by Dr. Sarmiento yesterday.     Much of his HPI was obtained from his s/o, who reports that the patient has been needing to void at least every hour. Due to his mobility issues in light of recent fracture, he has been having trouble getting to the bathroom in time. He has tried to use a plastic urinal to help with this, but still does not always have success with this either. He is up hourly overnight. He denies dysuria or hematuria.     AUA symptom score 15 today (quality of life: terrible). Per chart review, he was previously treated with with Flomax, but had to switch to a different medication due to cost. However, it appears that this was prior to generic tamsulosin being available. He does not recall taking this drug specifically.             Past Medical History:     Past Medical History:   Diagnosis Date     Advanced open-angle glaucoma      Antiplatelet or antithrombotic long-term use      Atrial fibrillation (H)      CKD (chronic kidney disease), stage III (H) 2005     Colon cancer (H)     Stage II-B colon cancer     Coronary artery disease     s/p CABG x 2, JEREMY  x 2     Diverticulitis      Hyperlipidemia      Hypertension      Ischemic cardiomyopathy      MGUS (monoclonal gammopathy of unknown significance)      Nonsenile cataract     BE     Pacemaker      Polymorphic ventricular tachycardia (H)      Polymyalgia rheumatica (H)      PVD (posterior vitreous detachment), both eyes 2005     S/P ablation of atrial flutter 6/20/14    CTI     Stented coronary artery      SVT (supraventricular tachycardia) (H)     PPM/AICD for NSVT     Upper leg DVT (deep venous thromboembolism), chronic (H)     Left     Weight loss, unintentional 2017    15 lb in 4 months            Past Surgical History:     Past Surgical History:   Procedure Laterality Date     C CABG, ARTERY-VEIN, FOUR  2006    LIMA-LAD, SVG-Rt PDA, SVG-OM2, SVG-Diag 1     C CABG, VEIN, SINGLE  1994    1-vessel CABG, SVG->PDRCA      CATARACT IOL, RT/LT Bilateral      COLONOSCOPY  3/13/2014    Procedure: COMBINED COLONOSCOPY, SINGLE BIOPSY/POLYPECTOMY BY BIOPSY;;  Surgeon: Mary Gerber MD;  Location:  GI     COLONOSCOPY N/A 6/22/2015    Procedure: COLONOSCOPY;  Surgeon: Marilin Newman MD;  Location:  GI     COLONOSCOPY N/A 11/7/2018    Procedure: COMBINED COLONOSCOPY, SINGLE OR MULTIPLE BIOPSY/POLYPECTOMY BY BIOPSY;  Surgeon: Roberto Esteban MD;  Location:  GI     EP ICD GENERATOR CHANGE DUAL N/A 12/11/2018    Procedure: EP ICD Generator Change Dual;  Surgeon: Deedee Baird MD;  Location:  HEART CARDIAC CATH LAB     H ABLATION ATRIAL FLUTTER       HEART CATH DRUG ELUTING STENT PLACEMENT  4/19/2012    JEREMY x 2 to LCx     IMPLANT IMPLANTABLE CARDIOVERTER DEFIBRILLATOR  5-    ppm/aicd     KNEE SURGERY      right and left knee surgeries     LAPAROSCOPIC ASSISTED COLECTOMY Right 2/1/2019    Procedure: Laparoscopic Converted to Open Transverse Colectomy with Lysis of Adhesions;  Surgeon: Chanelle Guzmán MD;  Location: [facility]     LAPAROSCOPIC ASSISTED COLECTOMY LEFT (DESCENDING)   4/8/2014    Procedure: LAPAROSCOPIC ASSISTED COLECTOMY LEFT (DESCENDING);  Hand assisted Laparoscopic Sigmoid Colectomy , Laparoscopic mobilization of spleenic flexure *Latex Allergy*Anesthesia General with Block;  Surgeon: Chanelle Guzmán MD;  [facility]: [facility]     OPEN REDUCTION INTERNAL FIXATION RODDING INTRAMEDULLAR FEMUR FRACTURE TABLE Left 3/14/2019    Procedure: Open Reduction Internal Fixation Left Femur Intramedullar Nailing;  Surgeon: Srikanth Avalos MD;  Location: [facility]     REPAIR VALVE MITRAL  2006     30-mm Medtronic Julian ring      SELECTIVE LASER TRABECULOPLASTY (SLT) OD (RIGHT EYE)  4/10, 1/12,+1/9/13    RE     SELECTIVE LASER TRABECULOPLASTY (SLT) OS (LEFT EYE)  5/2012     SHOULDER SURGERY      right rotator cuff            Medications     Current Outpatient Medications   Medication     acetaminophen (TYLENOL) 325 MG tablet     aspirin 81 MG tablet     atorvastatin (LIPITOR) 40 MG tablet     cholecalciferol 1000 units TABS     cyanocobalamin (VITAMIN B-12) 1000 MCG tablet     doxazosin (CARDURA) 1 MG tablet     emollient (VANICREAM) external cream     metoprolol tartrate (LOPRESSOR) 25 MG tablet     mirtazapine (REMERON) 15 MG tablet     Multiple Vitamins-Minerals (CENTRUM SILVER) per tablet     order for DME     psyllium (METAMUCIL/KONSYL) Packet     senna-docusate (SENOKOT-S/PERICOLACE) 8.6-50 MG tablet     sertraline (ZOLOFT) 100 MG tablet     tamsulosin (FLOMAX) 0.4 MG capsule     warfarin (COUMADIN) 5 MG tablet     Blood Pressure Monitor KIT     No current facility-administered medications for this visit.             Family History:     Family History   Problem Relation Age of Onset     C.A.D. Father      Anesthesia Reaction No family hx of      Crohn's Disease No family hx of      Ulcerative Colitis No family hx of      Cancer - colorectal No family hx of      Macular Degeneration No family hx of      Cancer No family hx of         No known family hx of skin cancer      "Melanoma No family hx of      Skin Cancer No family hx of             Social History:     Social History     Socioeconomic History     Marital status:      Spouse name: Not on file     Number of children: Not on file     Years of education: Not on file     Highest education level: Not on file   Occupational History     Not on file   Social Needs     Financial resource strain: Not on file     Food insecurity:     Worry: Not on file     Inability: Not on file     Transportation needs:     Medical: Not on file     Non-medical: Not on file   Tobacco Use     Smoking status: Former Smoker     Types: Cigarettes, Cigars     Last attempt to quit: 1968     Years since quittin.3     Smokeless tobacco: Never Used   Substance and Sexual Activity     Alcohol use: Yes     Alcohol/week: 0.0 oz     Comment: 2-3 drinks twice weekly     Drug use: No     Sexual activity: Not on file   Lifestyle     Physical activity:     Days per week: Not on file     Minutes per session: Not on file     Stress: Not on file   Relationships     Social connections:     Talks on phone: Not on file     Gets together: Not on file     Attends Rastafari service: Not on file     Active member of club or organization: Not on file     Attends meetings of clubs or organizations: Not on file     Relationship status: Not on file     Intimate partner violence:     Fear of current or ex partner: Not on file     Emotionally abused: Not on file     Physically abused: Not on file     Forced sexual activity: Not on file   Other Topics Concern     Parent/sibling w/ CABG, MI or angioplasty before 65F 55M? Not Asked   Social History Narrative     Not on file            Allergies:   Latex         Review of Systems:  From intake questionnaire   Negative 14 system review except as noted on HPI, nurse's note.         Physical Exam:   Patient is a 83 year old  male   Vitals: Blood pressure 120/69, pulse 89, height 1.702 m (5' 7\"), weight 65.3 kg (144 " lb).  General Appearance Adult: Alert, no acute distress, oriented  HENT: throat/mouth:normal, good dentition  Neck: No adenopathy,masses or thyromegaly  Lungs: no respiratory distress, or pursed lip breathing  Heart: No obvious jugular venous distension present  Abdomen: soft, nontender, no organomegaly or masses, Body mass index is 22.55 kg/m .  Lymphatics: No cervical or supraclavicular adenopathy  Musculoskeltal: extremities normal, no peripheral edema  Skin: no suspicious lesions or rashes  Neuro: Alert, oriented, speech and mentation normal  Psych: affect and mood normal  Gait: Normal  : deferred     Uroflow and Post-Void Residual by Bladder Scan   Unable to void today in clinic to obtain PVR. Last voided 1.5 hours prior to bladder scan.  RU: 166 mL        Labs and Pathology:    I personally reviewed all applicable laboratory data and went over findings with patient  Significant for:    CBC RESULTS:  Recent Labs   Lab Test 04/09/19  0923 03/28/19  0522 03/25/19  0600 03/23/19  0554 03/22/19  0552 03/19/19  1036   WBC 7.5  --  6.1  --  5.9 5.7   HGB 9.0* 8.0* 8.1* 8.6* 6.8* 7.6*     --  259  --  242 215        BMP RESULTS:  Recent Labs   Lab Test 04/09/19  0923 03/28/19  0522 03/25/19  0600 03/22/19  0552    144 145* 144   POTASSIUM 4.8 4.6 4.9 4.5   CHLORIDE 111* 112* 113* 112*   CO2 25 23 25 24   ANIONGAP 5 9 7 8   * 76 82 82   BUN 53* 47* 43* 43*   CR 1.25 1.26* 1.20 1.21   GFRESTIMATED 53* 52* 55* 55*   GFRESTBLACK 61 60* 64 63   KEITH 9.5 8.3* 8.1* 7.9*       UA RESULTS:   Recent Labs   Lab Test 03/23/19  1636 03/12/19  2200 01/07/19  1136   SG 1.017 1.014 1.018   URINEPH 6.5 5.5 5.0   NITRITE Negative Negative Negative   RBCU 1 3* 1   WBCU 1 <1 1       CALCIUM RESULTS  Lab Results   Component Value Date    KEITH 9.5 04/09/2019    KEITH 8.3 03/28/2019    KEITH 8.1 03/25/2019       PSA RESULTS  PSA   Date Value Ref Range Status   02/27/2012 1.88 0 - 4 ug/L Final     Comment:     PSA results  are about 7% lower than our prior method due to a methodology   change   on August 30, 2011.   04/22/2008 1.40 0 - 4 ug/L Final   03/19/2007 1.43 0 - 4 ug/L Final   11/02/2005 1.10 0 - 4 ug/L Final       INR  Recent Labs   Lab Test 04/09/19  0923 04/03/19  1400 03/29/19  0543 03/27/19  0652   INR 1.69* 2.08* 2.24* 2.50*           Imaging:    I personally reviewed all applicable imaging and went over findings with patient.  Significant for:    Results for orders placed or performed during the hospital encounter of 03/12/19   XR Pelvis and Hip Left 2 Views    Narrative    Exam: XR PELVIS AND HIP LEFT 2 VIEWS, 3/12/2019 3:31 PM    Indication: pain after fall    Comparison: CT chest/abdomen/pelvis 10/1/2018    Findings:   AP views of the pelvis, AP and crosstable lateral view of the left  hip.    Left intertrochanteric fracture with superior angulation of the  proximal femoral component and superior migration of the distal  component. Fracture line extends into the midportion of the greater  trochanter. Prominent bone island in the left femoral head,  characterized as benign on the most recent CT. The femoral diaphyseal  cortex is well delineated, likely due to projectional rotation.    Marked arterial vascular calcifications. Pelvic phleboliths. Surgical  sutures in the descending colon.      Impression    Impression:   Intertrochanteric fracture of the left proximal femur. Superior  angulation of the proximal, and superior migration of the distal  femoral components.    I have personally reviewed the examination and initial interpretation  and I agree with the findings.    ASHELY TIRADO MD   XR Chest Port 1 View    Narrative    XR CHEST PORT 1 VW 3/12/2019 5:25 PM    History: pre-op eval    Comparison: CT 10/1/2018 comparison radiograph 9/11/2016    Findings: Single AP view of the chest at 30 degrees upright. There are  postoperative changes of coronary artery bypass. Mild cardiomegaly.  Left-sided implanted cardiac  defibrillator/pacemaker device with leads  in stable position, projecting over the right atrium and right  ventricle. Median sternotomy wires are intact. Abandoned epicardial  pacemaker leads. Bibasilar scarring/atelectasis. No new focal  pulmonary opacities. Artificial mitral valve. Pulmonary vasculature is  within normal limits no significant pleural effusion or pneumothorax.  Degenerative changes of the right shoulder joint. The visualized bony  structures are otherwise within normal limits.      Impression    Impression:   1. No focal airspace opacities.  2. Cardiomegaly.    I have personally reviewed the examination and initial interpretation  and I agree with the findings.    RA CLARKE MD   XR Femur Left 2 Views    Narrative    Exam: XR FEMUR LT 2 VW, 3/12/2019 7:10 PM    Indication: pre op planning for left femoral nail    Comparison: X-ray of the pelvis 3/12/2019    Findings:   AP and crosstable lateral views of the left hip and proximal knee..    Redemonstration of left intertrochanteric fracture with superior  angulation of the proximal femoral component and superior migration of  the distal component. Fracture line extends into the midportion of the  greater trochanter.     Marked arterial vascular calcifications. Pelvic phleboliths. Surgical  sutures in the descending colon. Severe joint space narrowing,  subchondral sclerosis and osteophytosis of the knee joint.      Impression    Impression:   Redemonstration of shortened/proximally migrated and varus angulation  of the intertrochanteric fracture of the left proximal femur.     I have personally reviewed the examination and initial interpretation  and I agree with the findings.    RA CLARKE MD   XR Surgery STACEY L/T 5 Min Fluoro w Stills    Narrative    This exam was marked as non-reportable because it will not be read by a   radiologist or a [place] non-radiologist provider.             XR Femur Port Left 2 Views    Narrative    XR  FEMUR PORT LEFT 2 VW 3/14/2019 8:36 PM    History: s/p IMN    Comparison: 3/12/2019    Findings: AP and lateral views of the left femur and hip. Interval  changes of open reduction and internal fixation of the left femur with  an intramedullary nail. Alignment of the hip is maintained. Improved  alignment of the intertrochanteric fracture. Calcifications of the  arteries in the upper legs.      Impression    Impression: Improved alignment of the intertrochanteric fracture  status post open reduction and internal fixation.    I have personally reviewed the examination and initial interpretation  and I agree with the findings.    ELIZABETH GARAY MD   US Lower Extremity Venous Duplex Left Port     Value    Radiologist flags Left lower extremity DVT (Urgent)    Narrative    EXAMINATION: DOPPLER VENOUS ULTRASOUND OF THE LEFT LOWER EXTREMITY,  3/18/2019 9:32 AM     COMPARISON: 9/10/2009    HISTORY: Left leg swelling.  Recent left hip fracture.    TECHNIQUE:  Gray-scale evaluation with compression, spectral flow, and  color Doppler assessment of the deep venous system of the left leg  from groin to knee, and then at the ankle.    FINDINGS:  Left mid thigh femoral vein is partially compressible, with  peripherally oriented echogenic thrombus suggesting underlying  chronicity.  Patient previously had femoral vein thrombus in the mid  thigh femoral vein in 2009.     In the left lower extremity, the common femoral, popliteal and  posterior tibial veins demonstrate normal compressibility and blood  flow. Subcutaneous edema noted at the level of the left ankle.    The evaluated right common femoral vein for comparison is fully  compressible without evidence of clot.      Impression    IMPRESSION:  Left mid femoral vein nonocclusive thrombus.  Acute on  chronic thrombus is a possibility given increased left leg swelling.       [Urgent Result: Left lower extremity DVT]    Finding was identified on 3/18/2019 9:29 AM.     Nurse  practitioner KYLE Browne was contacted by Dr. Boggs at 3/18/2019  10:01 AM and verbalized understanding of the urgent finding.     I have personally reviewed the examination and initial interpretation  and I agree with the findings.    KARLA BOGGS MD     *Note: Due to a large number of results and/or encounters for the requested time period, some results have not been displayed. A complete set of results can be found in Results Review.          Standardized Questionnaire:      [employer] SYMPTOM SCORE  SUM 15/35  QOL 6/6 (terrible)           Assessment and Plan:     Assessment: 83 year old male with frequency and urgency, with apparent urge incontinence. Suspect overflow, given his history of BPH. No relief of symptoms on doxazosin. Relatively low RU volume given he last voided 1.5 hours ago.     Plan:  -Start taking tamsulosin 0.4 mg daily at night  -Stop doxazosin   -Pelvic floor physical therapy referral  -Follow up with me in 6 weeks for a check of your symptoms and progress with the tamsulosin  -If no improvement with tamsulosin, will consider obtaining video urodynamics study to evaluate bladder function and/or level of outlet obstruction.      Alona Lowe, CNP  Department of Urology

## 2019-04-09 NOTE — PROGRESS NOTES
ANTICOAGULATION FOLLOW-UP CLINIC VISIT    Patient Name:  Noe Florence  Date:  2019  Contact Type:  Telephone    SUBJECTIVE:     Patient Findings     Comments:   Left message for patient to call the Coumadin clinic if there is an explanation for subtherapeutic INR result today.  Recommended a one time increase to 7.5mg today, then resume maintenance dose.           OBJECTIVE    INR   Date Value Ref Range Status   2019 1.69 (H) 0.86 - 1.14 Final       ASSESSMENT / PLAN  INR assessment SUB    Recheck INR In: 1 WEEK    INR Location Clinic      Anticoagulation Summary  As of 2019    INR goal:   2.0-3.0   TTR:   66.7 % (2.7 y)   INR used for dosin.69! (2019)   Warfarin maintenance plan:   No maintenance plan   Full warfarin instructions:   : 7.5 mg; 4/10: 5 mg; : 5 mg; : 5 mg; : 2.5 mg; : 5 mg; 4/15: 5 mg   Plan last modified:   Mark Ho, [facility] (3/7/2019)   Next INR check:   2019   Priority:   INR   Target end date:   Indefinite    Indications    Atrial fibrillation (H) [I48.91] [I48.91]  Long-term (current) use of anticoagulants [Z79.01] [Z79.01]             Anticoagulation Episode Summary     INR check location:       Preferred lab:       Send INR reminders to:   [facility]    Comments:   Patient contact number: [phone]   Can leave results with Rica (friend)  [facility] sees pt Ph [phone]      Anticoagulation Care Providers     Provider Role Specialty Phone number    Deedee Baird MD Responsible Cardiology [phone]            See the Encounter Report to view Anticoagulation Flowsheet and Dosing Calendar (Go to Encounters tab in chart review, and find the Anticoagulation Therapy Visit)    Left message for patient with results and dosing recommendations. Asked patient to call back to report any missed doses, falls, signs and symptoms of bleeding or clotting, any changes in health, medication, or diet. Asked patient to call  back with any questions or concerns.    Ciro Sharp, RN      4/10/19 ADDENDUM  Rica, Patient's S.O. Called.  Noe missed a dose of Coumadin.  Explainable subthearapeutic INR result.  Rica repeated back this writer's instructions from voicemail yesterday.  Verbalized understanding.    TE

## 2019-04-09 NOTE — NURSING NOTE
Chief Complaint   Patient presents with     Surgical Followup     pt here following surgery       Beth Young CMA at 8:02 AM on 4/9/2019.

## 2019-04-10 ENCOUNTER — OFFICE VISIT (OUTPATIENT)
Dept: UROLOGY | Facility: CLINIC | Age: 84
End: 2019-04-10
Attending: COLON & RECTAL SURGERY
Payer: COMMERCIAL

## 2019-04-10 ENCOUNTER — APPOINTMENT (OUTPATIENT)
Dept: LAB | Facility: CLINIC | Age: 84
End: 2019-04-10
Payer: COMMERCIAL

## 2019-04-10 VITALS
HEIGHT: 67 IN | SYSTOLIC BLOOD PRESSURE: 120 MMHG | BODY MASS INDEX: 22.6 KG/M2 | WEIGHT: 144 LBS | DIASTOLIC BLOOD PRESSURE: 69 MMHG | HEART RATE: 89 BPM

## 2019-04-10 DIAGNOSIS — R35.0 URINARY FREQUENCY: Primary | ICD-10-CM

## 2019-04-10 LAB
ALBUMIN UR-MCNC: NEGATIVE MG/DL
APPEARANCE UR: CLEAR
BILIRUB UR QL STRIP: NEGATIVE
COLOR UR AUTO: YELLOW
GLUCOSE UR STRIP-MCNC: 50 MG/DL
HGB UR QL STRIP: NEGATIVE
KETONES UR STRIP-MCNC: NEGATIVE MG/DL
LEUKOCYTE ESTERASE UR QL STRIP: NEGATIVE
MUCOUS THREADS #/AREA URNS LPF: PRESENT /LPF
NITRATE UR QL: NEGATIVE
PH UR STRIP: 5 PH (ref 5–7)
RBC #/AREA URNS AUTO: 1 /HPF (ref 0–2)
SOURCE: ABNORMAL
SP GR UR STRIP: 1.02 (ref 1–1.03)
UROBILINOGEN UR STRIP-MCNC: 0 MG/DL (ref 0–2)
WBC #/AREA URNS AUTO: 1 /HPF (ref 0–5)

## 2019-04-10 RX ORDER — TAMSULOSIN HYDROCHLORIDE 0.4 MG/1
0.4 CAPSULE ORAL DAILY
Qty: 30 CAPSULE | Refills: 3 | Status: SHIPPED | OUTPATIENT
Start: 2019-04-10 | End: 2019-05-30

## 2019-04-10 ASSESSMENT — PAIN SCALES - GENERAL: PAINLEVEL: NO PAIN (0)

## 2019-04-10 ASSESSMENT — MIFFLIN-ST. JEOR: SCORE: 1306.81

## 2019-04-10 NOTE — PATIENT INSTRUCTIONS
UROLOGY CLINIC VISIT PATIENT INSTRUCTIONS    1) Start taking tamsulosin 0.4 mg daily at night  2) I have placed a referral for pelvic floor physical therapy  3) Follow up with me in 6 weeks for a check of your symptoms and progress with the tamsulosin    If you have any issues, questions or concerns in the meantime, do not hesitate to contact us at [phone] or via [employer].     It was a pleasure meeting with you today.  Thank you for allowing me and my team the privilege of caring for you today.  YOU are the reason we are here, and I truly hope we provided you with the excellent service you deserve.  Please let us know if there is anything else we can do for you so that we can be sure you are leaving completely satisfied with your care experience.      Alona Lowe, CNP

## 2019-04-10 NOTE — NURSING NOTE
"Chief Complaint   Patient presents with     Consult For     Frequency, urgency       Blood pressure 120/69, pulse 89, height 1.702 m (5' 7\"), weight 65.3 kg (144 lb). Body mass index is 22.55 kg/m .    Patient Active Problem List   Diagnosis     Rotator cuff (capsule) sprain     Other postprocedural status(V45.89)     Hyperkalemia     Anemia     Diarrhea     Diverticulitis of colon     Hypertension     CAD (coronary artery disease)     NSVT (nonsustained ventricular tachycardia) (H)     NSTEMI (non-ST elevated myocardial infarction) (H)     Automatic implantable cardioverter-defibrillator - Medtronic dual chamber ICD - Not Dependent     Skin exam, screening for cancer     Tinea cruris     Tinea corporis     Colon cancer (H)     Polymyalgia rheumatica (H)     S/P ablation of atrial flutter     Primary open angle glaucoma     Atrial fibrillation (H) [I48.91]     Long-term (current) use of anticoagulants [Z79.01]     Ischemic cardiomyopathy     CKD (chronic kidney disease), stage III (H)     Weight loss, unintentional     Skin infection     Tinea pedis of both feet     Venous stasis dermatitis of both lower extremities     Actinic keratosis     Inflamed seborrheic keratosis     Closed left subtrochanteric femur fracture (H)     Hip fracture (H)       Allergies   Allergen Reactions     Latex Rash     Rash       Current Outpatient Medications   Medication Sig Dispense Refill     acetaminophen (TYLENOL) 325 MG tablet Take 2-3 tablets (650-975 mg) by mouth every 6 hours as needed for pain       aspirin 81 MG tablet Take 1 tablet by mouth daily.       atorvastatin (LIPITOR) 40 MG tablet Take 1 tablet (40 mg) by mouth daily as directed. 90 tablet 0     cholecalciferol 1000 units TABS Take 1,000 Units by mouth 2 times daily       cyanocobalamin (VITAMIN B-12) 1000 MCG tablet Take 1,000 mcg by mouth 2 times daily       doxazosin (CARDURA) 1 MG tablet Take 1 tablet (1 mg) by mouth At Bedtime       emollient (VANICREAM) external " cream Apply topically as needed for other (FEET)       metoprolol tartrate (LOPRESSOR) 25 MG tablet Take 0.5 tablets (12.5 mg) by mouth 2 times daily 180 tablet 3     mirtazapine (REMERON) 15 MG tablet Take 15 mg by mouth At Bedtime.       Multiple Vitamins-Minerals (CENTRUM SILVER) per tablet Take 1 tablet by mouth daily. AM        order for DME 20-30 mm Hg knee length compression stockings.  Measure and fit.  Style and color per patient preference.  Doff n Aracelis per patient need. 1 Units 0     psyllium (METAMUCIL/KONSYL) Packet Take 1 packet by mouth daily Or equivalent 1 teaspoon. Mix with at least 8 oz liquid.       senna-docusate (SENOKOT-S/PERICOLACE) 8.6-50 MG tablet Take 1-2 tablets by mouth 2 times daily as needed for constipation       sertraline (ZOLOFT) 100 MG tablet Take 100 mg by mouth every evening.       warfarin (COUMADIN) 5 MG tablet Adjust dose for target INR 2-3.  2.5 mg every Tuesday and Saturday; 5 mg [id number] 1 tablet 0     Blood Pressure Monitor KIT 1 Units daily (Patient not taking: Reported on 4/10/2019) 1 kit 0       Social History     Tobacco Use     Smoking status: Former Smoker     Types: Cigarettes, Cigars     Last attempt to quit: 1968     Years since quittin.3     Smokeless tobacco: Never Used   Substance Use Topics     Alcohol use: Yes     Alcohol/week: 0.0 oz     Comment: 2-3 drinks twice weekly     Drug use: No       Patito Jaquez LPN  4/10/2019  12:11 PM

## 2019-04-10 NOTE — LETTER
RE: Noe Florence  [address] [facility] [address]     Dear Colleague,    Thank you for referring your patient, Noe Florence, to the [facility] UROLOGY AND INST FOR PROSTATE AND UROLOGIC CANCERS at [facility]. Please see a copy of my visit note below.            Chief Complaint:   Urinary frequency          History of Present Illness:   Noe Florence is a very pleasant 83 year old male who presents today with his significant other to discuss his urinary frequency and urgency. Per chart review, he suffered a femur fracture from a fall on the ice, resulting in hospitalization on 3/12/19, surgical repair 3/14/19, and discharged to transitional care 3/19/19. He has since gone home, and has been getting home care services and PT. However, he has been experiencing urinary frequency and nocturia since his discharge from the hospital. He met with Dr. Sarmiento of primary care yesterday, who ordered a urinalysis in response to these symptoms. This was completed earlier today and was negative for acute infection. He is currently taking doxazosin, which was just decreased from BID to once daily in the evening by Dr. Sarmiento yesterday.     Much of his HPI was obtained from his s/o, who reports that the patient has been needing to void at least every hour. Due to his mobility issues in light of recent fracture, he has been having trouble getting to the bathroom in time. He has tried to use a plastic urinal to help with this, but still does not always have success with this either. He is up hourly overnight. He denies dysuria or hematuria.     AUA symptom score 15 today (quality of life: terrible). Per chart review, he was previously treated with with Flomax, but had to switch to a different medication due to cost. However, it appears that this was prior to generic tamsulosin being available. He does not recall taking this drug specifically.             Past Medical History:      Past Medical History:   Diagnosis Date     Advanced open-angle glaucoma      Antiplatelet or antithrombotic long-term use      Atrial fibrillation (H)      CKD (chronic kidney disease), stage III (H) 2005     Colon cancer (H)     Stage II-B colon cancer     Coronary artery disease     s/p CABG x 2, JEREMY x 2     Diverticulitis      Hyperlipidemia      Hypertension      Ischemic cardiomyopathy      MGUS (monoclonal gammopathy of unknown significance)      Nonsenile cataract     BE     Pacemaker      Polymorphic ventricular tachycardia (H)      Polymyalgia rheumatica (H)      PVD (posterior vitreous detachment), both eyes 2005     S/P ablation of atrial flutter 6/20/14    CTI     Stented coronary artery      SVT (supraventricular tachycardia) (H)     PPM/AICD for NSVT     Upper leg DVT (deep venous thromboembolism), chronic (H)     Left     Weight loss, unintentional 2017    15 lb in 4 months            Past Surgical History:     Past Surgical History:   Procedure Laterality Date     C CABG, ARTERY-VEIN, FOUR  2006    LIMA-LAD, SVG-Rt PDA, SVG-OM2, SVG-Diag 1     C CABG, VEIN, SINGLE  1994    1-vessel CABG, SVG->PDRCA      CATARACT IOL, RT/LT Bilateral      COLONOSCOPY  3/13/2014    Procedure: COMBINED COLONOSCOPY, SINGLE BIOPSY/POLYPECTOMY BY BIOPSY;;  Surgeon: Mary Gerber MD;  Location:  GI     COLONOSCOPY N/A 6/22/2015    Procedure: COLONOSCOPY;  Surgeon: Marilin Newman MD;  Location:  GI     COLONOSCOPY N/A 11/7/2018    Procedure: COMBINED COLONOSCOPY, SINGLE OR MULTIPLE BIOPSY/POLYPECTOMY BY BIOPSY;  Surgeon: Roberto Esteban MD;  Location:  GI     EP ICD GENERATOR CHANGE DUAL N/A 12/11/2018    Procedure: EP ICD Generator Change Dual;  Surgeon: Deedee Baird MD;  Location:  HEART CARDIAC CATH LAB     H ABLATION ATRIAL FLUTTER       HEART CATH DRUG ELUTING STENT PLACEMENT  4/19/2012    JEREMY x 2 to LCx     IMPLANT IMPLANTABLE CARDIOVERTER DEFIBRILLATOR  5-    ppm/aicd      KNEE SURGERY      right and left knee surgeries     LAPAROSCOPIC ASSISTED COLECTOMY Right 2/1/2019    Procedure: Laparoscopic Converted to Open Transverse Colectomy with Lysis of Adhesions;  Surgeon: Chanelle Guzmán MD;  Location: [facility] OR     LAPAROSCOPIC ASSISTED COLECTOMY LEFT (DESCENDING)  4/8/2014    Procedure: LAPAROSCOPIC ASSISTED COLECTOMY LEFT (DESCENDING);  Hand assisted Laparoscopic Sigmoid Colectomy , Laparoscopic mobilization of spleenic flexure *Latex Allergy*Anesthesia General with Block;  Surgeon: Chanelle Guzmán MD;  Location: [facility] OR     OPEN REDUCTION INTERNAL FIXATION RODDING INTRAMEDULLAR FEMUR FRACTURE TABLE Left 3/14/2019    Procedure: Open Reduction Internal Fixation Left Femur Intramedullar Nailing;  Surgeon: Srikanth Avalos MD;  Location: [facility] OR     REPAIR VALVE MITRAL  2006     30-mm Medtronic Julian ring      SELECTIVE LASER TRABECULOPLASTY (SLT) OD (RIGHT EYE)  4/10, 1/12,+1/9/13    RE     SELECTIVE LASER TRABECULOPLASTY (SLT) OS (LEFT EYE)  5/2012     SHOULDER SURGERY      right rotator cuff            Medications     Current Outpatient Medications   Medication     acetaminophen (TYLENOL) 325 MG tablet     aspirin 81 MG tablet     atorvastatin (LIPITOR) 40 MG tablet     cholecalciferol 1000 units TABS     cyanocobalamin (VITAMIN B-12) 1000 MCG tablet     doxazosin (CARDURA) 1 MG tablet     emollient (VANICREAM) external cream     metoprolol tartrate (LOPRESSOR) 25 MG tablet     mirtazapine (REMERON) 15 MG tablet     Multiple Vitamins-Minerals (CENTRUM SILVER) per tablet     order for DME     psyllium (METAMUCIL/KONSYL) Packet     senna-docusate (SENOKOT-S/PERICOLACE) 8.6-50 MG tablet     sertraline (ZOLOFT) 100 MG tablet     tamsulosin (FLOMAX) 0.4 MG capsule     warfarin (COUMADIN) 5 MG tablet     Blood Pressure Monitor KIT     No current facility-administered medications for this visit.             Family History:     Family History   Problem Relation Age of  Onset     C.A.D. Father      Anesthesia Reaction No family hx of      Crohn's Disease No family hx of      Ulcerative Colitis No family hx of      Cancer - colorectal No family hx of      Macular Degeneration No family hx of      Cancer No family hx of         No known family hx of skin cancer     Melanoma No family hx of      Skin Cancer No family hx of             Social History:     Social History     Socioeconomic History     Marital status:      Spouse name: Not on file     Number of children: Not on file     Years of education: Not on file     Highest education level: Not on file   Occupational History     Not on file   Social Needs     Financial resource strain: Not on file     Food insecurity:     Worry: Not on file     Inability: Not on file     Transportation needs:     Medical: Not on file     Non-medical: Not on file   Tobacco Use     Smoking status: Former Smoker     Types: Cigarettes, Cigars     Last attempt to quit: 1968     Years since quittin.3     Smokeless tobacco: Never Used   Substance and Sexual Activity     Alcohol use: Yes     Alcohol/week: 0.0 oz     Comment: 2-3 drinks twice weekly     Drug use: No     Sexual activity: Not on file   Lifestyle     Physical activity:     Days per week: Not on file     Minutes per session: Not on file     Stress: Not on file   Relationships     Social connections:     Talks on phone: Not on file     Gets together: Not on file     Attends Mandaen service: Not on file     Active member of club or organization: Not on file     Attends meetings of clubs or organizations: Not on file     Relationship status: Not on file     Intimate partner violence:     Fear of current or ex partner: Not on file     Emotionally abused: Not on file     Physically abused: Not on file     Forced sexual activity: Not on file   Other Topics Concern     Parent/sibling w/ CABG, MI or angioplasty before 65F 55M? Not Asked   Social History Narrative     Not on file        "   Allergies:   Latex         Physical Exam:   Patient is a 83 year old  male   Vitals: Blood pressure 120/69, pulse 89, height 1.702 m (5' 7\"), weight 65.3 kg (144 lb).  General Appearance Adult: Alert, no acute distress, oriented  HENT: throat/mouth:normal, good dentition  Neck: No adenopathy,masses or thyromegaly  Lungs: no respiratory distress, or pursed lip breathing  Heart: No obvious jugular venous distension present  Abdomen: soft, nontender, no organomegaly or masses, Body mass index is 22.55 kg/m .  Lymphatics: No cervical or supraclavicular adenopathy  Musculoskeltal: extremities normal, no peripheral edema  Skin: no suspicious lesions or rashes  Neuro: Alert, oriented, speech and mentation normal  Psych: affect and mood normal  Gait: Normal  : deferred     Uroflow and Post-Void Residual by Bladder Scan   Unable to void today in clinic to obtain PVR. Last voided 1.5 hours prior to bladder scan.  RU: 166 mL        Labs and Pathology:    I personally reviewed all applicable laboratory data and went over findings with patient  Significant for:    CBC RESULTS:  Recent Labs   Lab Test 04/09/19  0923 03/28/19  0522 03/25/19  0600 03/23/19  0554 03/22/19  0552 03/19/19  1036   WBC 7.5  --  6.1  --  5.9 5.7   HGB 9.0* 8.0* 8.1* 8.6* 6.8* 7.6*     --  259  --  242 215        BMP RESULTS:  Recent Labs   Lab Test 04/09/19  0923 03/28/19  0522 03/25/19  0600 03/22/19  0552    144 145* 144   POTASSIUM 4.8 4.6 4.9 4.5   CHLORIDE 111* 112* 113* 112*   CO2 25 23 25 24   ANIONGAP 5 9 7 8   * 76 82 82   BUN 53* 47* 43* 43*   CR 1.25 1.26* 1.20 1.21   GFRESTIMATED 53* 52* 55* 55*   GFRESTBLACK 61 60* 64 63   KEITH 9.5 8.3* 8.1* 7.9*       UA RESULTS:   Recent Labs   Lab Test 03/23/19  1636 03/12/19  2200 01/07/19  1136   SG 1.017 1.014 1.018   URINEPH 6.5 5.5 5.0   NITRITE Negative Negative Negative   RBCU 1 3* 1   WBCU 1 <1 1       CALCIUM RESULTS  Lab Results   Component Value Date    CAL 9.5 " 04/09/2019    KEITH 8.3 03/28/2019    KEITH 8.1 03/25/2019       PSA RESULTS  PSA   Date Value Ref Range Status   02/27/2012 1.88 0 - 4 ug/L Final     Comment:     PSA results are about 7% lower than our prior method due to a methodology   change   on August 30, 2011.   04/22/2008 1.40 0 - 4 ug/L Final   03/19/2007 1.43 0 - 4 ug/L Final   11/02/2005 1.10 0 - 4 ug/L Final       INR  Recent Labs   Lab Test 04/09/19  0923 04/03/19  1400 03/29/19  0543 03/27/19  0652   INR 1.69* 2.08* 2.24* 2.50*           Imaging:    I personally reviewed all applicable imaging and went over findings with patient.  Significant for:    Results for orders placed or performed during the hospital encounter of 03/12/19   XR Pelvis and Hip Left 2 Views    Narrative    Exam: XR PELVIS AND HIP LEFT 2 VIEWS, 3/12/2019 3:31 PM    Indication: pain after fall    Comparison: CT chest/abdomen/pelvis 10/1/2018    Findings:   AP views of the pelvis, AP and crosstable lateral view of the left  hip.    Left intertrochanteric fracture with superior angulation of the  proximal femoral component and superior migration of the distal  component. Fracture line extends into the midportion of the greater  trochanter. Prominent bone island in the left femoral head,  characterized as benign on the most recent CT. The femoral diaphyseal  cortex is well delineated, likely due to projectional rotation.    Marked arterial vascular calcifications. Pelvic phleboliths. Surgical  sutures in the descending colon.      Impression    Impression:   Intertrochanteric fracture of the left proximal femur. Superior  angulation of the proximal, and superior migration of the distal  femoral components.    I have personally reviewed the examination and initial interpretation  and I agree with the findings.    ASHELY TIRADO MD   XR Chest Port 1 View    Narrative    XR CHEST PORT 1 VW 3/12/2019 5:25 PM    History: pre-op eval    Comparison: CT 10/1/2018 comparison radiograph  9/11/2016    Findings: Single AP view of the chest at 30 degrees upright. There are  postoperative changes of coronary artery bypass. Mild cardiomegaly.  Left-sided implanted cardiac defibrillator/pacemaker device with leads  in stable position, projecting over the right atrium and right  ventricle. Median sternotomy wires are intact. Abandoned epicardial  pacemaker leads. Bibasilar scarring/atelectasis. No new focal  pulmonary opacities. Artificial mitral valve. Pulmonary vasculature is  within normal limits no significant pleural effusion or pneumothorax.  Degenerative changes of the right shoulder joint. The visualized bony  structures are otherwise within normal limits.      Impression    Impression:   1. No focal airspace opacities.  2. Cardiomegaly.    I have personally reviewed the examination and initial interpretation  and I agree with the findings.    RA CLARKE MD   XR Femur Left 2 Views    Narrative    Exam: XR FEMUR LT 2 VW, 3/12/2019 7:10 PM    Indication: pre op planning for left femoral nail    Comparison: X-ray of the pelvis 3/12/2019    Findings:   AP and crosstable lateral views of the left hip and proximal knee..    Redemonstration of left intertrochanteric fracture with superior  angulation of the proximal femoral component and superior migration of  the distal component. Fracture line extends into the midportion of the  greater trochanter.     Marked arterial vascular calcifications. Pelvic phleboliths. Surgical  sutures in the descending colon. Severe joint space narrowing,  subchondral sclerosis and osteophytosis of the knee joint.      Impression    Impression:   Redemonstration of shortened/proximally migrated and varus angulation  of the intertrochanteric fracture of the left proximal femur.     I have personally reviewed the examination and initial interpretation  and I agree with the findings.    RA CLARKE MD   XR Surgery STACEY L/T 5 Min Fluoro w Stills    Narrative     This exam was marked as non-reportable because it will not be read by a   radiologist or a [place] non-radiologist provider.             XR Femur Port Left 2 Views    Narrative    XR FEMUR PORT LEFT 2 VW 3/14/2019 8:36 PM    History: s/p IMN    Comparison: 3/12/2019    Findings: AP and lateral views of the left femur and hip. Interval  changes of open reduction and internal fixation of the left femur with  an intramedullary nail. Alignment of the hip is maintained. Improved  alignment of the intertrochanteric fracture. Calcifications of the  arteries in the upper legs.      Impression    Impression: Improved alignment of the intertrochanteric fracture  status post open reduction and internal fixation.    I have personally reviewed the examination and initial interpretation  and I agree with the findings.    ELIZABETH GARAY MD   US Lower Extremity Venous Duplex Left Port     Value    Radiologist flags Left lower extremity DVT (Urgent)    Narrative    EXAMINATION: DOPPLER VENOUS ULTRASOUND OF THE LEFT LOWER EXTREMITY,  3/18/2019 9:32 AM     COMPARISON: 9/10/2009    HISTORY: Left leg swelling.  Recent left hip fracture.    TECHNIQUE:  Gray-scale evaluation with compression, spectral flow, and  color Doppler assessment of the deep venous system of the left leg  from groin to knee, and then at the ankle.    FINDINGS:  Left mid thigh femoral vein is partially compressible, with  peripherally oriented echogenic thrombus suggesting underlying  chronicity.  Patient previously had femoral vein thrombus in the mid  thigh femoral vein in 2009.     In the left lower extremity, the common femoral, popliteal and  posterior tibial veins demonstrate normal compressibility and blood  flow. Subcutaneous edema noted at the level of the left ankle.    The evaluated right common femoral vein for comparison is fully  compressible without evidence of clot.      Impression    IMPRESSION:  Left mid femoral vein nonocclusive thrombus.  Acute  on  chronic thrombus is a possibility given increased left leg swelling.       [Urgent Result: Left lower extremity DVT]    Finding was identified on 3/18/2019 9:29 AM.     Nurse practitioner KYLE Browne was contacted by Dr. Boggs at 3/18/2019  10:01 AM and verbalized understanding of the urgent finding.     I have personally reviewed the examination and initial interpretation  and I agree with the findings.    KARLA BOGGS MD     *Note: Due to a large number of results and/or encounters for the requested time period, some results have not been displayed. A complete set of results can be found in Results Review.          Standardized Questionnaire:      [employer] SYMPTOM SCORE  SUM 15/35  QOL 6/6 (terrible)           Assessment and Plan:     Assessment: 83 year old male with frequency and urgency, with apparent urge incontinence. Suspect overflow, given his history of BPH. No relief of symptoms on doxazosin. Relatively low RU volume given he last voided 1.5 hours ago.     Plan:  -Start taking tamsulosin 0.4 mg daily at night  -Stop doxazosin   -Pelvic floor physical therapy referral  -Follow up with me in 6 weeks for a check of your symptoms and progress with the tamsulosin  -If no improvement with tamsulosin, will consider obtaining video urodynamics study to evaluate bladder function and/or level of outlet obstruction.      Alona Lowe, CNP  Department of Urology

## 2019-04-12 ENCOUNTER — TELEPHONE (OUTPATIENT)
Dept: INTERNAL MEDICINE | Facility: CLINIC | Age: 84
End: 2019-04-12

## 2019-04-12 NOTE — TELEPHONE ENCOUNTER
[facility] Call Center    Phone Message    May a detailed message be left on voicemail: yes    Reason for Call: Order(s): Home Care Orders: Occupational Therapy (OT): extend OT orders for 10days     Action Taken: Message routed to:  [facility] ([facility]): Primary care

## 2019-04-16 ENCOUNTER — ANTICOAGULATION THERAPY VISIT (OUTPATIENT)
Dept: ANTICOAGULATION | Facility: CLINIC | Age: 84
End: 2019-04-16

## 2019-04-16 ENCOUNTER — MEDICAL CORRESPONDENCE (OUTPATIENT)
Dept: HEALTH INFORMATION MANAGEMENT | Facility: CLINIC | Age: 84
End: 2019-04-16

## 2019-04-16 DIAGNOSIS — I48.20 CHRONIC ATRIAL FIBRILLATION (H): ICD-10-CM

## 2019-04-16 LAB — INR PPP: 1.8

## 2019-04-16 NOTE — PROGRESS NOTES
ANTICOAGULATION FOLLOW-UP CLINIC VISIT    Patient Name:  Noe Florence  Date:  2019  Contact Type:  Telephone    SUBJECTIVE:     Patient Findings     Positives:   Change in medications, Change in diet/appetite    Comments:   Metoprolol decreased to 0.5 tablets BID and started on Flomax. Pt also ate increased amounts of spinach and broccoli. Pt's spouse heard in the background stating pt is going to continue to eat spinach, but not the broccoli.              OBJECTIVE    INR   Date Value Ref Range Status   2019 1.80  Final     Comment:     Home Care        ASSESSMENT / PLAN  INR assessment SUB    Recheck INR In: 1 WEEK    INR Location Homecare INR      Anticoagulation Summary  As of 2019    INR goal:   2.0-3.0   TTR:   66.2 % (2.7 y)   INR used for dosin.80! (2019)   Warfarin maintenance plan:   No maintenance plan   Full warfarin instructions:   : 7.5 mg; : 5 mg; 18: 5 mg; : 5 mg; 20: 5 mg; : 5 mg; : 5 mg   Plan last modified:   Mark Ho, [facility] (3/7/2019)   Next INR check:   2019   Priority:   INR   Target end date:   Indefinite    Indications    Atrial fibrillation (H) [I48.91] [I48.91]  Long-term (current) use of anticoagulants [Z79.01] [Z79.01]             Anticoagulation Episode Summary     INR check location:       Preferred lab:       Send INR reminders to:   [facility]    Comments:   Patient contact number: [phone]   Can leave results with Rica (friend)  [facility] sees pt Ph [phone]      Anticoagulation Care Providers     Provider Role Specialty Phone number    Deedee Baird MD Responsible Cardiology [phone]            See the Encounter Report to view Anticoagulation Flowsheet and Dosing Calendar (Go to Encounters tab in chart review, and find the Anticoagulation Therapy Visit)    Spoke with KIEL Nava. Gave them new warfarin recommendation.  No changes in health, medication, or diet. No missed doses,  no falls. No signs or symptoms of bleed or clotting.     Nguyen Zuniga, RN

## 2019-04-19 NOTE — TELEPHONE ENCOUNTER
RECORDS RECEIVED FROM: ED Left Hip Fracture ORIF 6 wk pop DOS 3/14/19   DATE RECEIVED: Apr 30, 2019    NOTES STATUS DETAILS   OFFICE NOTE from referring provider N/A    OFFICE NOTE from other specialist N/A    DISCHARGE SUMMARY from hospital N/A    DISCHARGE REPORT from the ER Internal 3/12/19, 3/19/19   OPERATIVE REPORT Internal 3/14/19 Dr. Avalos   MEDICATION LIST Internal    IMPLANT RECORD/STICKER N/A    LABS     CBC/DIFF Internal 4/9/19   CULTURES N/A    INJECTIONS DONE IN RADIOLOGY N/A    MRI Internal 10/1/18   CT SCAN N/A    XRAYS (IMAGES & REPORTS) Internal 3/12/19...   TUMOR     PATHOLOGY  Slides & report N/A

## 2019-04-22 ENCOUNTER — DOCUMENTATION ONLY (OUTPATIENT)
Dept: CARE COORDINATION | Facility: CLINIC | Age: 84
End: 2019-04-22

## 2019-04-22 NOTE — PROGRESS NOTES
[facility] now requests orders and shares plan of care/discharge summaries for some patients through "[employer].  Please REPLY TO THIS MESSAGE OR ROUTE BACK TO THE AUTHOR in order to give authorization for orders when needed.  This is considered a verbal order, you will still receive a faxed copy of orders for signature.  Thank you for your assistance in improving collaboration for our patients.    ORDER    OT 1w1, 2w2 for HEP, activity tolerance, ADL/IADL safety, caregiver education, continence home program.    Glenda Thurman, OTR/L  [phone]    Agree  Roberto Sarmiento

## 2019-04-23 ENCOUNTER — ANTICOAGULATION THERAPY VISIT (OUTPATIENT)
Dept: ANTICOAGULATION | Facility: CLINIC | Age: 84
End: 2019-04-23

## 2019-04-23 DIAGNOSIS — I48.20 CHRONIC ATRIAL FIBRILLATION (H): ICD-10-CM

## 2019-04-23 LAB — INR PPP: 2.7 (ref 0.9–1.1)

## 2019-04-23 NOTE — PROGRESS NOTES
ANTICOAGULATION FOLLOW-UP CLINIC VISIT    Patient Name:  Noe Florence  Date:  2019  Contact Type:  Telephone    SUBJECTIVE:     Patient Findings     Comments:   Gloria reports no changes in health, diet, medications.  His INR jumped from 1.8 to 2.7 in one week, so will cut warfarin dose slightly and recheck INR in one week.           OBJECTIVE    INR   Date Value Ref Range Status   2019 2.7 (A) 0.9 - 1.1 Final       ASSESSMENT / PLAN  INR assessment THER    Recheck INR In: 1 WEEK    INR Location Clinic      Anticoagulation Summary  As of 2019    INR goal:   2.0-3.0   TTR:   66.3 % (2.7 y)   INR used for dosin.7 (2019)   Warfarin maintenance plan:   No maintenance plan   Full warfarin instructions:   : 5 mg; : 5 mg; : 5 mg; : 5 mg; : 5 mg; : 5 mg; : 5 mg   Plan last modified:   Mark Ho, [facility] (3/7/2019)   Next INR check:   2019   Priority:   INR   Target end date:   Indefinite    Indications    Atrial fibrillation (H) [I48.91] [I48.91]  Long-term (current) use of anticoagulants [Z79.01] [Z79.01]             Anticoagulation Episode Summary     INR check location:       Preferred lab:       Send INR reminders to:   [facility]    Comments:   Patient contact number: [phone]   Can leave results with Rica (friend)  19 [facility] sees pt Ph [phone]      Anticoagulation Care Providers     Provider Role Specialty Phone number    Deedee Baird MD [facility] Cardiology [phone]            See the Encounter Report to view Anticoagulation Flowsheet and Dosing Calendar (Go to Encounters tab in chart review, and find the Anticoagulation Therapy Visit)    Spoke with Gloria MARQUEZ.  Gloria reports no changes in health, diet, medications.  His INR jumped from 1.8 to 2.7 in one week, so will cut warfarin dose slightly and recheck INR in one week.    Danya Edwards RN

## 2019-04-24 ENCOUNTER — MEDICAL CORRESPONDENCE (OUTPATIENT)
Dept: HEALTH INFORMATION MANAGEMENT | Facility: CLINIC | Age: 84
End: 2019-04-24

## 2019-04-24 DIAGNOSIS — Z98.890 S/P ORIF (OPEN REDUCTION INTERNAL FIXATION) FRACTURE: Primary | ICD-10-CM

## 2019-04-24 DIAGNOSIS — Z87.81 S/P ORIF (OPEN REDUCTION INTERNAL FIXATION) FRACTURE: Primary | ICD-10-CM

## 2019-04-29 ENCOUNTER — MEDICAL CORRESPONDENCE (OUTPATIENT)
Dept: HEALTH INFORMATION MANAGEMENT | Facility: CLINIC | Age: 84
End: 2019-04-29

## 2019-04-30 ENCOUNTER — OFFICE VISIT (OUTPATIENT)
Dept: ORTHOPEDICS | Facility: CLINIC | Age: 84
End: 2019-04-30
Payer: COMMERCIAL

## 2019-04-30 ENCOUNTER — ANTICOAGULATION THERAPY VISIT (OUTPATIENT)
Dept: ANTICOAGULATION | Facility: CLINIC | Age: 84
End: 2019-04-30

## 2019-04-30 ENCOUNTER — PRE VISIT (OUTPATIENT)
Dept: ORTHOPEDICS | Facility: CLINIC | Age: 84
End: 2019-04-30

## 2019-04-30 ENCOUNTER — ANCILLARY PROCEDURE (OUTPATIENT)
Dept: GENERAL RADIOLOGY | Facility: CLINIC | Age: 84
End: 2019-04-30
Attending: ORTHOPAEDIC SURGERY
Payer: COMMERCIAL

## 2019-04-30 DIAGNOSIS — Z98.890 S/P ORIF (OPEN REDUCTION INTERNAL FIXATION) FRACTURE: Primary | ICD-10-CM

## 2019-04-30 DIAGNOSIS — I48.91 ATRIAL FIBRILLATION (H): ICD-10-CM

## 2019-04-30 DIAGNOSIS — Z87.81 S/P ORIF (OPEN REDUCTION INTERNAL FIXATION) FRACTURE: ICD-10-CM

## 2019-04-30 DIAGNOSIS — I48.20 CHRONIC ATRIAL FIBRILLATION (H): ICD-10-CM

## 2019-04-30 DIAGNOSIS — Z87.81 S/P ORIF (OPEN REDUCTION INTERNAL FIXATION) FRACTURE: Primary | ICD-10-CM

## 2019-04-30 DIAGNOSIS — Z98.890 S/P ORIF (OPEN REDUCTION INTERNAL FIXATION) FRACTURE: ICD-10-CM

## 2019-04-30 DIAGNOSIS — M25.552 PAIN OF LEFT HIP JOINT: ICD-10-CM

## 2019-04-30 LAB — INR PPP: 2.43 (ref 0.86–1.14)

## 2019-04-30 NOTE — LETTER
4/30/2019       RE: Noe Florence  [address] [facility] [address]     Dear Colleague,    Thank you for referring your patient, Noe Florence, to the [facility] at [facility]. Please see a copy of my visit note below.    CHIEF COMPLAINT:  Status post left hip fracture with IM nailing performed 03/14/2019.      HISTORY OF PRESENT ILLNESS:  Mr. Florence presents today for further followup.  Denies to have any problems or complaints.  Reports to be ambulating.  Presents in the accompaniment of his wife.  The patient is now back home and is doing home PT.      PHYSICAL EXAMINATION:  On today's visit, he presents with no pain with range of motion of the hip, except for some greater trochanter bursitis.  Presents with no groin pain.  All surgical incisions are well-healed, per patient report.      RADIOGRAPHIC EVALUATION:  AP and lateral x-ray of the left femur were obtained today which were significant for showing a consolidation across the fracture site.  Hardware is intact and in place.  Alignment is excellent.  I do not see any stresses through the knee as the nail is well located.      ASSESSMENT:  Status post left femur IM nailing.      PLAN:  I discussed with the patient that we are going to continue ambulating.  A new prescription for physical therapy was given to the patient.  He will continue pushing his activity as tolerated.      The patient will follow up in 6 weeks from now and at that time, 2 views of the left femur will be obtained and based on those findings, further recommendations will be given to the patient.     Again, thank you for allowing me to participate in the care of your patient.      Sincerely,    Srikanth Avalos MD

## 2019-04-30 NOTE — PROGRESS NOTES
ANTICOAGULATION FOLLOW-UP CLINIC VISIT    Patient Name:  Noe Florence  Date:  2019  Contact Type:  Telephone    SUBJECTIVE:     Patient Findings     Comments:   Spoke to Rica.  Noe has an appointment  On .  Will have an INR drawn then.  If home care visits prior to appointment, they will draw patient at home.           OBJECTIVE    INR   Date Value Ref Range Status   2019 2.43 (H) 0.86 - 1.14 Final       ASSESSMENT / PLAN  INR assessment THER    Recheck INR In: 2 WEEKS    INR Location Clinic      Anticoagulation Summary  As of 2019    INR goal:   2.0-3.0   TTR:   66.6 % (2.7 y)   INR used for dosin.43 (2019)   Warfarin maintenance plan:   5 mg (5 mg x 1) every day   Full warfarin instructions:   5 mg every day   Weekly warfarin total:   35 mg   Plan last modified:   Ciro Sharp RN (2019)   Next INR check:   2019   Priority:   INR   Target end date:   Indefinite    Indications    Atrial fibrillation (H) [I48.91] [I48.91]  Long-term (current) use of anticoagulants [Z79.01] [Z79.01]             Anticoagulation Episode Summary     INR check location:       Preferred lab:       Send INR reminders to:   [facility]    Comments:   Patient contact number: [phone]   Can leave results with Rica (friend)  [facility] sees pt Ph [phone]      Anticoagulation Care Providers     Provider Role Specialty Phone number    Deedee Baird MD Responsible Cardiology [phone]            See the Encounter Report to view Anticoagulation Flowsheet and Dosing Calendar (Go to Encounters tab in chart review, and find the Anticoagulation Therapy Visit)    Spoke with Rica. Gave them their lab results and new warfarin recommendation.  No changes in health, medication, or diet. No missed doses, no falls. No signs or symptoms of bleed or clotting.    Ciro Sharp, RN

## 2019-04-30 NOTE — NURSING NOTE
Reason For Visit:   Chief Complaint   Patient presents with     RECHECK     left hip fracture DOS: 3/14/19       There were no vitals taken for this visit.    Pain Assessment  Patient Currently in Pain: Denies(patient reports some clicking)    Tanesha Mckeon ATC

## 2019-04-30 NOTE — PROGRESS NOTES
CHIEF COMPLAINT:  Status post left hip fracture with IM nailing performed 03/14/2019.      HISTORY OF PRESENT ILLNESS:  Mr. Florence presents today for further followup.  Denies to have any problems or complaints.  Reports to be ambulating.  Presents in the accompaniment of his wife.  The patient is now back home and is doing home PT.      PHYSICAL EXAMINATION:  On today's visit, he presents with no pain with range of motion of the hip, except for some greater trochanter bursitis.  Presents with no groin pain.  All surgical incisions are well-healed, per patient report.      RADIOGRAPHIC EVALUATION:  AP and lateral x-ray of the left femur were obtained today which were significant for showing a consolidation across the fracture site.  Hardware is intact and in place.  Alignment is excellent.  I do not see any stresses through the knee as the nail is well located.      ASSESSMENT:  Status post left femur IM nailing.      PLAN:  I discussed with the patient that we are going to continue ambulating.  A new prescription for physical therapy was given to the patient.  He will continue pushing his activity as tolerated.      The patient will follow up in 6 weeks from now and at that time, 2 views of the left femur will be obtained and based on those findings, further recommendations will be given to the patient.

## 2019-05-01 NOTE — PROGRESS NOTES
"    Heart Care - Clinical Cardiac Electrophysiology     HPI:   Noe Florence is a 83 year old male who presents with his wife for follow up s/p ICD generator change.  The patient has a past medical history significant for  HTN, HLD, CKD3, CAD s/p CABG x2, DESx2, polymorphic VT s/p ICD placement (5/2012, gen change 12/2018), and AF/AFL s/p CTI (6/2014) and right atrial appendage atrial tachycardia ablation (9/2014). His ICD reached REBECCA and he underwent an ICD gen change with Dr. Deedee Baird on 12/11/2018.     Reviewed current interval:  - Today s Device check:\"Medtronic dual lead ICD\" \"Normal ICD function. 140 AT/AF episodes recorded - < 1 min - 19 hrs 9 min in duration. AF burden = 2.1%. 1 NSVT episode recorded - 1 sec, 240 bpm. Intrinsic rhythm = NSR @ 66 bpm. AP = 47.2%.  = 34.9%. OptiVol fluid index is near baseline. Estimated battery longevity to REBECCA = 9.4 years. No short v-v intervals recorded. Lead trends appear stable.\"    Current interval history:  Patient states has not yet resumed normal activity due to 2 recent surgeries(GI and hip fracture after falling on the ice). Has pedal edema off and on for years, better with compression stockings. Denies chest pain or pressure, syncope, dyspnea at rest or with exertion, palpitations, orthopnea, PND, or claudication. Denies fever or chills. States that incision site is well healed without drainage, redness, warmth, or pain.    Current Outpatient Medications   Medication Sig Dispense Refill     acetaminophen (TYLENOL) 325 MG tablet Take 2-3 tablets (650-975 mg) by mouth every 6 hours as needed for pain       aspirin 81 MG tablet Take 1 tablet by mouth daily.       atorvastatin (LIPITOR) 40 MG tablet Take 1 tablet (40 mg) by mouth daily as directed. 90 tablet 0     Blood Pressure Monitor KIT 1 Units daily 1 kit 0     cholecalciferol 1000 units TABS Take 1,000 Units by mouth 2 times daily       cyanocobalamin (VITAMIN B-12) 1000 MCG tablet Take 1,000 mcg by mouth " 2 times daily       doxazosin (CARDURA) 1 MG tablet Take 1 mg by mouth 2 times daily       emollient (VANICREAM) external cream Apply topically as needed for other (FEET)       metoprolol tartrate (LOPRESSOR) 25 MG tablet Take 1 tablet (25 mg) by mouth 2 times daily 180 tablet 3     mirtazapine (REMERON) 15 MG tablet Take 15 mg by mouth At Bedtime.       Multiple Vitamins-Minerals (CENTRUM SILVER) per tablet Take 1 tablet by mouth daily. AM        order for DME 20-30 mm Hg knee length compression stockings.  Measure and fit.  Style and color per patient preference.  Doff n Aracelis per patient need. 1 Units 0     psyllium (METAMUCIL/KONSYL) Packet Take 1 packet by mouth daily Or equivalent 1 teaspoon. Mix with at least 8 oz liquid.       senna-docusate (SENOKOT-S/PERICOLACE) 8.6-50 MG tablet Take 1-2 tablets by mouth 2 times daily as needed for constipation       sertraline (ZOLOFT) 100 MG tablet Take 100 mg by mouth every evening.       warfarin (COUMADIN) 5 MG tablet Adjust dose for target INR 2-3.  2.5 mg every Tuesday and Saturday; 5 mg MWThFSu 1 tablet 0       Past Medical History:   Diagnosis Date     Advanced open-angle glaucoma      Antiplatelet or antithrombotic long-term use      Atrial fibrillation (H)      CKD (chronic kidney disease), stage III (H) 2005     Colon cancer (H)     Stage II-B colon cancer     Coronary artery disease     s/p CABG x 2, JEREMY x 2     Diverticulitis      Hyperlipidemia      Hypertension      Ischemic cardiomyopathy      MGUS (monoclonal gammopathy of unknown significance)      Nonsenile cataract     BE     Pacemaker      Polymorphic ventricular tachycardia (H)      Polymyalgia rheumatica (H)      PVD (posterior vitreous detachment), both eyes 2005     S/P ablation of atrial flutter 6/20/14    CTI     Stented coronary artery      SVT (supraventricular tachycardia) (H)     PPM/AICD for NSVT     Upper leg DVT (deep venous thromboembolism), chronic (H)     Left     Weight loss,  unintentional 2017    15 lb in 4 months       Past Surgical History:   Procedure Laterality Date     C CABG, ARTERY-VEIN, FOUR  2006    LIMA-LAD, SVG-Rt PDA, SVG-OM2, SVG-Diag 1     C CABG, VEIN, SINGLE  1994    1-vessel CABG, SVG->PDRCA      CATARACT IOL, RT/LT Bilateral      COLONOSCOPY  3/13/2014    Procedure: COMBINED COLONOSCOPY, SINGLE BIOPSY/POLYPECTOMY BY BIOPSY;;  Surgeon: Mary Gerber MD;  Location:  GI     COLONOSCOPY N/A 6/22/2015    Procedure: COLONOSCOPY;  Surgeon: Marilin Newman MD;  Location:  GI     COLONOSCOPY N/A 11/7/2018    Procedure: COMBINED COLONOSCOPY, SINGLE OR MULTIPLE BIOPSY/POLYPECTOMY BY BIOPSY;  Surgeon: Roberto Esteban MD;  Location:  GI     EP ICD GENERATOR CHANGE DUAL N/A 12/11/2018    Procedure: EP ICD Generator Change Dual;  Surgeon: Deedee Baird MD;  Location:  HEART CARDIAC CATH LAB     H ABLATION ATRIAL FLUTTER       HEART CATH DRUG ELUTING STENT PLACEMENT  4/19/2012    JEREMY x 2 to LCx     IMPLANT IMPLANTABLE CARDIOVERTER DEFIBRILLATOR  5-    ppm/aicd     KNEE SURGERY      right and left knee surgeries     LAPAROSCOPIC ASSISTED COLECTOMY Right 2/1/2019    Procedure: Laparoscopic Converted to Open Transverse Colectomy with Lysis of Adhesions;  Surgeon: Chanelle Guzmán MD;  Location:  OR     LAPAROSCOPIC ASSISTED COLECTOMY LEFT (DESCENDING)  4/8/2014    Procedure: LAPAROSCOPIC ASSISTED COLECTOMY LEFT (DESCENDING);  Hand assisted Laparoscopic Sigmoid Colectomy , Laparoscopic mobilization of spleenic flexure *Latex Allergy*Anesthesia General with Block;  Surgeon: Chanelle Guzmán MD;  Location:  OR     OPEN REDUCTION INTERNAL FIXATION RODDING INTRAMEDULLAR FEMUR FRACTURE TABLE Left 3/14/2019    Procedure: Open Reduction Internal Fixation Left Femur Intramedullar Nailing;  Surgeon: Srikanth Avalos MD;  Location:  OR     REPAIR VALVE MITRAL  2006     30-mm Medtronic Julian ring      SELECTIVE LASER  TRABECULOPLASTY (SLT) OD (RIGHT EYE)  4/10, ,+13    RE     SELECTIVE LASER TRABECULOPLASTY (SLT) OS (LEFT EYE)  2012     SHOULDER SURGERY      right rotator cuff       Family History   Problem Relation Age of Onset     C.A.D. Father      Anesthesia Reaction No family hx of      Crohn's Disease No family hx of      Ulcerative Colitis No family hx of      Cancer - colorectal No family hx of      Macular Degeneration No family hx of      Cancer No family hx of         No known family hx of skin cancer     Melanoma No family hx of      Skin Cancer No family hx of        Social History     Tobacco Use     Smoking status: Former Smoker     Types: Cigarettes, Cigars     Last attempt to quit: 1968     Years since quittin.2     Smokeless tobacco: Never Used   Substance Use Topics     Alcohol use: Yes     Alcohol/week: 0.0 oz     Comment: 2-3 drinks twice weekly       Allergies   Allergen Reactions     Latex Rash     Rash         ROS:   A complete review of systems was performed and is negative except as noted in the HPI.    Physical Examination:  Vitals: 126/65, HR 69, RR 16    GENERAL APPEARANCE: alert and no acute distress  HEENT: no icterus, no xanthelasmas, normal pupil size and reaction, no cyanosis.  NECK: no asymmetry, no cervical bruits, no JVD   RESPIRATORY: lungs clear to auscultation - no rales, rhonchi or wheezes, no use of accessory muscles, no retractions, respirations are unlabored, normal respiratory rate  CARDIOVASCULAR: regular rhythm, 2/6 systolic murmur.  GI: no abdominal bruits, soft, non-tender  EXTREMITIES: no clubbing  NEURO: alert and oriented to person/place/time, normal speech, and affect. Uses a walker  VASC: Radial and posterior tibialis pulses +2 and symmetric bilaterally. No cyanosis. No edema.   SKIN: no ecchymoses. Statis dermatitis bilateral lower legs. Well healed left subclavian device site is noted.    Assessment and recommendations:  Patient has resumed normal  activities and incision sites are well healed.    # Cardiac device in situ: polymorphic VT s/p ICD placement (5/2012, gen change 12/2018), and AF/AFL s/p CTI (6/2014) and right atrial appendage atrial tachycardia ablation (9/2014). His ICD reached REBECCA and he underwent an ICD gen change with Dr. Deedee Baird on 12/11/2018.   1. Normal device function.  2. Keep scheduled follow ups with Device Clinic    Follow up: Follow up in EP Clinic in one year or follow up sooner as needed for symptoms or problems.    Patient expresses understanding and agreement with the plan.    I appreciate the chance to help with Noe Florence's care. Please let me know if you have any questions or concerns.    Jaye GLASS, NP-C

## 2019-05-02 ENCOUNTER — OFFICE VISIT (OUTPATIENT)
Dept: CARDIOLOGY | Facility: CLINIC | Age: 84
End: 2019-05-02
Attending: NURSE PRACTITIONER
Payer: COMMERCIAL

## 2019-05-02 ENCOUNTER — ANCILLARY PROCEDURE (OUTPATIENT)
Dept: CARDIOLOGY | Facility: CLINIC | Age: 84
End: 2019-05-02
Attending: INTERNAL MEDICINE
Payer: COMMERCIAL

## 2019-05-02 VITALS
DIASTOLIC BLOOD PRESSURE: 65 MMHG | BODY MASS INDEX: 21.83 KG/M2 | SYSTOLIC BLOOD PRESSURE: 126 MMHG | WEIGHT: 139.1 LBS | OXYGEN SATURATION: 100 % | HEART RATE: 69 BPM | HEIGHT: 67 IN

## 2019-05-02 DIAGNOSIS — I47.29 NSVT (NONSUSTAINED VENTRICULAR TACHYCARDIA) (H): ICD-10-CM

## 2019-05-02 DIAGNOSIS — Z95.810 AUTOMATIC IMPLANTABLE CARDIOVERTER-DEFIBRILLATOR IN SITU: ICD-10-CM

## 2019-05-02 DIAGNOSIS — Z45.02 IMPLANTABLE CARDIOVERTER-DEFIBRILLATOR (ICD) AT END OF BATTERY LIFE: ICD-10-CM

## 2019-05-02 DIAGNOSIS — I48.0 PAROXYSMAL ATRIAL FIBRILLATION (H): ICD-10-CM

## 2019-05-02 PROCEDURE — 99212 OFFICE O/P EST SF 10 MIN: CPT | Mod: ZP | Performed by: NURSE PRACTITIONER

## 2019-05-02 PROCEDURE — G0463 HOSPITAL OUTPT CLINIC VISIT: HCPCS | Mod: ZF

## 2019-05-02 ASSESSMENT — MIFFLIN-ST. JEOR: SCORE: 1284.58

## 2019-05-02 ASSESSMENT — PAIN SCALES - GENERAL: PAINLEVEL: NO PAIN (0)

## 2019-05-02 NOTE — PATIENT INSTRUCTIONS
It was a pleasure to see you in clinic today.  Please do not hesitate to call with any questions or concerns.  You are scheduled for a remote transmission on 8/7/19.  We look forward to seeing you in clinic at your next device check in 6 months.    Kathy Bright, RN, MS, CCRN  Electrophysiology Nurse Clinician  [facility]    During Business Hours Please Call:  [phone]  After Hours Please Call:  [phone] - select option #4 and ask for job code [phone]

## 2019-05-02 NOTE — LETTER
"5/2/2019      RE: Neo Florence  [address] [facility] [address]       Dear Colleague,    Thank you for the opportunity to participate in the care of your patient, Noe Florence, at the [facility] at [facility]. Please see a copy of my visit note below.    Heart Care - Clinical Cardiac Electrophysiology     HPI:   Noe Florence is a 83 year old male who presents with his wife for follow up s/p ICD generator change.  The patient has a past medical history significant for  HTN, HLD, CKD3, CAD s/p CABG x2, DESx2, polymorphic VT s/p ICD placement (5/2012, gen change 12/2018), and AF/AFL s/p CTI (6/2014) and right atrial appendage atrial tachycardia ablation (9/2014). His ICD reached REBECCA and he underwent an ICD gen change with Dr. Deedee Baird on 12/11/2018.     Reviewed current interval:  - Today s Device check:\"Medtronic dual lead ICD\" \"Normal ICD function. 140 AT/AF episodes recorded - < 1 min - 19 hrs 9 min in duration. AF burden = 2.1%. 1 NSVT episode recorded - 1 sec, 240 bpm. Intrinsic rhythm = NSR @ 66 bpm. AP = 47.2%.  = 34.9%. OptiVol fluid index is near baseline. Estimated battery longevity to REBECCA = 9.4 years. No short v-v intervals recorded. Lead trends appear stable.\"    Current interval history:  Patient states has not yet resumed normal activity due to 2 recent surgeries(GI and hip fracture after falling on the ice). Has pedal edema off and on for years, better with compression stockings. Denies chest pain or pressure, syncope, dyspnea at rest or with exertion, palpitations, orthopnea, PND, or claudication. Denies fever or chills. States that incision site is well healed without drainage, redness, warmth, or pain.    Current Outpatient Medications   Medication Sig Dispense Refill     acetaminophen (TYLENOL) 325 MG tablet Take 2-3 tablets (650-975 mg) by mouth every 6 hours as needed for pain       aspirin 81 MG tablet Take 1 tablet by mouth " daily.       atorvastatin (LIPITOR) 40 MG tablet Take 1 tablet (40 mg) by mouth daily as directed. 90 tablet 0     Blood Pressure Monitor KIT 1 Units daily 1 kit 0     cholecalciferol 1000 units TABS Take 1,000 Units by mouth 2 times daily       cyanocobalamin (VITAMIN B-12) 1000 MCG tablet Take 1,000 mcg by mouth 2 times daily       doxazosin (CARDURA) 1 MG tablet Take 1 mg by mouth 2 times daily       emollient (VANICREAM) external cream Apply topically as needed for other (FEET)       metoprolol tartrate (LOPRESSOR) 25 MG tablet Take 1 tablet (25 mg) by mouth 2 times daily 180 tablet 3     mirtazapine (REMERON) 15 MG tablet Take 15 mg by mouth At Bedtime.       Multiple Vitamins-Minerals (CENTRUM SILVER) per tablet Take 1 tablet by mouth daily. AM        order for DME 20-30 mm Hg knee length compression stockings.  Measure and fit.  Style and color per patient preference.  Doff n Aracelis per patient need. 1 Units 0     psyllium (METAMUCIL/KONSYL) Packet Take 1 packet by mouth daily Or equivalent 1 teaspoon. Mix with at least 8 oz liquid.       senna-docusate (SENOKOT-S/PERICOLACE) 8.6-50 MG tablet Take 1-2 tablets by mouth 2 times daily as needed for constipation       sertraline (ZOLOFT) 100 MG tablet Take 100 mg by mouth every evening.       warfarin (COUMADIN) 5 MG tablet Adjust dose for target INR 2-3.  2.5 mg every Tuesday and Saturday; 5 mg MWThFSu 1 tablet 0       Past Medical History:   Diagnosis Date     Advanced open-angle glaucoma      Antiplatelet or antithrombotic long-term use      Atrial fibrillation (H)      CKD (chronic kidney disease), stage III (H) 2005     Colon cancer (H)     Stage II-B colon cancer     Coronary artery disease     s/p CABG x 2, JEREMY x 2     Diverticulitis      Hyperlipidemia      Hypertension      Ischemic cardiomyopathy      MGUS (monoclonal gammopathy of unknown significance)      Nonsenile cataract     BE     Pacemaker      Polymorphic ventricular tachycardia (H)       Polymyalgia rheumatica (H)      PVD (posterior vitreous detachment), both eyes 2005     S/P ablation of atrial flutter 6/20/14    CTI     Stented coronary artery      SVT (supraventricular tachycardia) (H)     PPM/AICD for NSVT     Upper leg DVT (deep venous thromboembolism), chronic (H)     Left     Weight loss, unintentional 2017    15 lb in 4 months       Past Surgical History:   Procedure Laterality Date     C CABG, ARTERY-VEIN, FOUR  2006    LIMA-LAD, SVG-Rt PDA, SVG-OM2, SVG-Diag 1     C CABG, VEIN, SINGLE  1994    1-vessel CABG, SVG->PDRCA      CATARACT IOL, RT/LT Bilateral      COLONOSCOPY  3/13/2014    Procedure: COMBINED COLONOSCOPY, SINGLE BIOPSY/POLYPECTOMY BY BIOPSY;;  Surgeon: Mary Gerber MD;  Location:  GI     COLONOSCOPY N/A 6/22/2015    Procedure: COLONOSCOPY;  Surgeon: Marilin Newman MD;  Location:  GI     COLONOSCOPY N/A 11/7/2018    Procedure: COMBINED COLONOSCOPY, SINGLE OR MULTIPLE BIOPSY/POLYPECTOMY BY BIOPSY;  Surgeon: Roberto Esteban MD;  Location:  GI     EP ICD GENERATOR CHANGE DUAL N/A 12/11/2018    Procedure: EP ICD Generator Change Dual;  Surgeon: Deedee Baird MD;  Location:  HEART CARDIAC CATH LAB     H ABLATION ATRIAL FLUTTER       HEART CATH DRUG ELUTING STENT PLACEMENT  4/19/2012    JEREMY x 2 to LCx     IMPLANT IMPLANTABLE CARDIOVERTER DEFIBRILLATOR  5-    ppm/aicd     KNEE SURGERY      right and left knee surgeries     LAPAROSCOPIC ASSISTED COLECTOMY Right 2/1/2019    Procedure: Laparoscopic Converted to Open Transverse Colectomy with Lysis of Adhesions;  Surgeon: Chanelle Guzmán MD;  Location:  OR     LAPAROSCOPIC ASSISTED COLECTOMY LEFT (DESCENDING)  4/8/2014    Procedure: LAPAROSCOPIC ASSISTED COLECTOMY LEFT (DESCENDING);  Hand assisted Laparoscopic Sigmoid Colectomy , Laparoscopic mobilization of spleenic flexure *Latex Allergy*Anesthesia General with Block;  Surgeon: Chanelle Guzmán MD;  Location:  OR     OPEN  REDUCTION INTERNAL FIXATION RODDING INTRAMEDULLAR FEMUR FRACTURE TABLE Left 3/14/2019    Procedure: Open Reduction Internal Fixation Left Femur Intramedullar Nailing;  Surgeon: Srikanth Avalos MD;  Location: [facility] OR     REPAIR VALVE MITRAL  2006     30-mm Medtronic Julian ring      SELECTIVE LASER TRABECULOPLASTY (SLT) OD (RIGHT EYE)  4/10, ,+13    RE     SELECTIVE LASER TRABECULOPLASTY (SLT) OS (LEFT EYE)  2012     SHOULDER SURGERY      right rotator cuff       Family History   Problem Relation Age of Onset     C.A.D. Father      Anesthesia Reaction No family hx of      Crohn's Disease No family hx of      Ulcerative Colitis No family hx of      Cancer - colorectal No family hx of      Macular Degeneration No family hx of      Cancer No family hx of         No known family hx of skin cancer     Melanoma No family hx of      Skin Cancer No family hx of        Social History     Tobacco Use     Smoking status: Former Smoker     Types: Cigarettes, Cigars     Last attempt to quit: 1968     Years since quittin.2     Smokeless tobacco: Never Used   Substance Use Topics     Alcohol use: Yes     Alcohol/week: 0.0 oz     Comment: 2-3 drinks twice weekly       Allergies   Allergen Reactions     Latex Rash     Rash         ROS:   A complete review of systems was performed and is negative except as noted in the HPI.    Physical Examination:  Vitals: 126/65, HR 69, RR 16    GENERAL APPEARANCE: alert and no acute distress  HEENT: no icterus, no xanthelasmas, normal pupil size and reaction, no cyanosis.  NECK: no asymmetry, no cervical bruits, no JVD   RESPIRATORY: lungs clear to auscultation - no rales, rhonchi or wheezes, no use of accessory muscles, no retractions, respirations are unlabored, normal respiratory rate  CARDIOVASCULAR: regular rhythm, 2/6 systolic murmur.  GI: no abdominal bruits, soft, non-tender  EXTREMITIES: no clubbing  NEURO: alert and oriented to person/place/time, normal speech,  and affect. Uses a walker  VASC: Radial and posterior tibialis pulses +2 and symmetric bilaterally. No cyanosis. No edema.   SKIN: no ecchymoses. Statis dermatitis bilateral lower legs. Well healed left subclavian device site is noted.    Assessment and recommendations:  Patient has resumed normal activities and incision sites are well healed.    # Cardiac device in situ: polymorphic VT s/p ICD placement (5/2012, gen change 12/2018), and AF/AFL s/p CTI (6/2014) and right atrial appendage atrial tachycardia ablation (9/2014). His ICD reached REBECCA and he underwent an ICD gen change with Dr. Deedee Baird on 12/11/2018.   1. Normal device function.  2. Keep scheduled follow ups with Device Clinic    Follow up: Follow up in EP Clinic in one year or follow up sooner as needed for symptoms or problems.    Patient expresses understanding and agreement with the plan.    I appreciate the chance to help with Noe AGATHA Florence's care. Please let me know if you have any questions or concerns.    Jaye GLASS, COLTON-C

## 2019-05-02 NOTE — NURSING NOTE
Chief Complaint   Patient presents with     Follow Up     none. Device check prior. Follow up s/p ICD gen change     Vitals were taken and medications were reconciled.     Pilar Centeno RMA  3:40 PM

## 2019-05-02 NOTE — PATIENT INSTRUCTIONS
Cardiology Provider you saw in clinic today: Jaye GLASS NP-C    Medication Changes:  None today    Labs/Tests needed:  None today     Follow-up Visit:  Follow up in one year or follow up sooner as needed for symptoms or problems.     Further Instructions:      You will receive all normal lab and testing results via [employer] or mail if not reviewed in clinic today. Please contact our office if you need assistance with setting up MyChart.    If you need a medication refill please contact your pharmacy. Please allow 3 business days for your refill to be completed.     As always, thank you for trusting us with your health care needs!    If you have any questions regarding your visit please contact your care team:   Cardiology  Telephone Number    EP RN  Electrophysiology Nurse Coordinator [phone]     Call for EP procedure scheduling concerns  PAYTON Sanchez  [phone]           Device Clinic (Pacemakers, ICDs, Loop)   RN's : Trena Hernandez Connie, Dawn During business hours: [phone]    After business hours:   [phone]- select option 4 and ask for job code [id number].

## 2019-05-03 ENCOUNTER — TELEPHONE (OUTPATIENT)
Dept: INTERNAL MEDICINE | Facility: CLINIC | Age: 84
End: 2019-05-03

## 2019-05-03 NOTE — TELEPHONE ENCOUNTER
Verbal ok for Order(s): Home Care Orders: Physical Therapy (PT): Wanting to extend 3 more weeks 2 visits a week, Also wanitng to Extend for 3 more weeks for Home Health Aid 2 Visits a week, plus a shower by home health aid 5/4/19  Shannon Mccormack on 5/3/2019 at 4:08 PM

## 2019-05-03 NOTE — TELEPHONE ENCOUNTER
Health Call Center    Phone Message    May a detailed message be left on voicemail: yes, Antione is wanting a call back at his confidential line at [phone] for verbal Okay for Pt, Pt is needing to have a shower by Home Health Aid tomorrow 5/4/2019. Antione is needing a call back by the end of the day today asap.     Reason for Call: Order(s): Home Care Orders: Physical Therapy (PT): Wanting to extend 3 more weeks 2 visits a week, Also wanitng to Extend for 3 more weeks for Home Health Aid 2 Visits a week,     Action Taken: Message routed to:  [facility]

## 2019-05-07 ENCOUNTER — MEDICAL CORRESPONDENCE (OUTPATIENT)
Dept: HEALTH INFORMATION MANAGEMENT | Facility: CLINIC | Age: 84
End: 2019-05-07

## 2019-05-10 ENCOUNTER — MEDICAL CORRESPONDENCE (OUTPATIENT)
Dept: HEALTH INFORMATION MANAGEMENT | Facility: CLINIC | Age: 84
End: 2019-05-10

## 2019-05-10 ENCOUNTER — DOCUMENTATION ONLY (OUTPATIENT)
Dept: CARE COORDINATION | Facility: CLINIC | Age: 84
End: 2019-05-10

## 2019-05-10 NOTE — PROGRESS NOTES
[facility] now requests orders and shares plan of care/discharge summaries for some patients through [employer].  Please REPLY TO THIS MESSAGE OR ROUTE BACK TO THE AUTHOR in order to give authorization for orders when needed.  This is considered a verbal order, you will still receive a faxed copy of orders for signature.  Thank you for your assistance in improving collaboration for our patients.    ORDER    OT to continue 2w2 for HEP, activity tolerance, ADL/IADL safety, caregiver education.    Glenda Thurman, OTR/L  [phone]    Agree  Roberto Sarmiento

## 2019-05-10 NOTE — PROGRESS NOTES
[facility] now requests orders and shares plan of care/discharge summaries for some patients through [employer].  Please REPLY TO THIS MESSAGE OR ROUTE BACK TO THE AUTHOR in order to give authorization for orders when needed.  This is considered a verbal order, you will still receive a faxed copy of orders for signature.  Thank you for your assistance in improving collaboration for our patients.    ORDER    OK for  assessment for community resources and life alert information/referral.    Glenda Thurman, OTR/L  [phone]

## 2019-05-13 ENCOUNTER — DOCUMENTATION ONLY (OUTPATIENT)
Dept: CARE COORDINATION | Facility: CLINIC | Age: 84
End: 2019-05-13

## 2019-05-14 LAB
MDC_IDC_EPISODE_DTM: NORMAL
MDC_IDC_EPISODE_DURATION: 1 S
MDC_IDC_EPISODE_DURATION: 107 S
MDC_IDC_EPISODE_DURATION: 1121 S
MDC_IDC_EPISODE_DURATION: 1123 S
MDC_IDC_EPISODE_DURATION: 135 S
MDC_IDC_EPISODE_DURATION: 165 S
MDC_IDC_EPISODE_DURATION: 1806 S
MDC_IDC_EPISODE_DURATION: 181 S
MDC_IDC_EPISODE_DURATION: 193 S
MDC_IDC_EPISODE_DURATION: 2202 S
MDC_IDC_EPISODE_DURATION: 231 S
MDC_IDC_EPISODE_DURATION: 261 S
MDC_IDC_EPISODE_DURATION: 312 S
MDC_IDC_EPISODE_DURATION: 313 S
MDC_IDC_EPISODE_DURATION: 327 S
MDC_IDC_EPISODE_DURATION: 352 S
MDC_IDC_EPISODE_DURATION: 3629 S
MDC_IDC_EPISODE_DURATION: 401 S
MDC_IDC_EPISODE_DURATION: 408 S
MDC_IDC_EPISODE_DURATION: 4095 S
MDC_IDC_EPISODE_DURATION: 43 S
MDC_IDC_EPISODE_DURATION: 463 S
MDC_IDC_EPISODE_DURATION: 4819 S
MDC_IDC_EPISODE_DURATION: 496 S
MDC_IDC_EPISODE_DURATION: 5165 S
MDC_IDC_EPISODE_DURATION: 522 S
MDC_IDC_EPISODE_DURATION: 5375 S
MDC_IDC_EPISODE_DURATION: 562 S
MDC_IDC_EPISODE_DURATION: 610 S
MDC_IDC_EPISODE_DURATION: 6389 S
MDC_IDC_EPISODE_DURATION: 65 S
MDC_IDC_EPISODE_DURATION: 6754 S
MDC_IDC_EPISODE_DURATION: 7334 S
MDC_IDC_EPISODE_DURATION: 736 S
MDC_IDC_EPISODE_DURATION: 78 S
MDC_IDC_EPISODE_DURATION: 781 S
MDC_IDC_EPISODE_DURATION: 80 S
MDC_IDC_EPISODE_DURATION: 800 S
MDC_IDC_EPISODE_DURATION: 809 S
MDC_IDC_EPISODE_DURATION: 882 S
MDC_IDC_EPISODE_DURATION: 885 S
MDC_IDC_EPISODE_DURATION: 8887 S
MDC_IDC_EPISODE_DURATION: 8922 S
MDC_IDC_EPISODE_DURATION: 9599 S
MDC_IDC_EPISODE_DURATION: 99 S
MDC_IDC_EPISODE_DURATION: 994 S
MDC_IDC_EPISODE_DURATION: 998 S
MDC_IDC_EPISODE_DURATION: NORMAL S
MDC_IDC_EPISODE_ID: 10
MDC_IDC_EPISODE_ID: 11
MDC_IDC_EPISODE_ID: 12
MDC_IDC_EPISODE_ID: 13
MDC_IDC_EPISODE_ID: 14
MDC_IDC_EPISODE_ID: 15
MDC_IDC_EPISODE_ID: 16
MDC_IDC_EPISODE_ID: 17
MDC_IDC_EPISODE_ID: 18
MDC_IDC_EPISODE_ID: 19
MDC_IDC_EPISODE_ID: 20
MDC_IDC_EPISODE_ID: 21
MDC_IDC_EPISODE_ID: 22
MDC_IDC_EPISODE_ID: 23
MDC_IDC_EPISODE_ID: 24
MDC_IDC_EPISODE_ID: 25
MDC_IDC_EPISODE_ID: 26
MDC_IDC_EPISODE_ID: 27
MDC_IDC_EPISODE_ID: 28
MDC_IDC_EPISODE_ID: 29
MDC_IDC_EPISODE_ID: 30
MDC_IDC_EPISODE_ID: 31
MDC_IDC_EPISODE_ID: 32
MDC_IDC_EPISODE_ID: 33
MDC_IDC_EPISODE_ID: 34
MDC_IDC_EPISODE_ID: 35
MDC_IDC_EPISODE_ID: 36
MDC_IDC_EPISODE_ID: 37
MDC_IDC_EPISODE_ID: 38
MDC_IDC_EPISODE_ID: 39
MDC_IDC_EPISODE_ID: 40
MDC_IDC_EPISODE_ID: 41
MDC_IDC_EPISODE_ID: 42
MDC_IDC_EPISODE_ID: 43
MDC_IDC_EPISODE_ID: 44
MDC_IDC_EPISODE_ID: 45
MDC_IDC_EPISODE_ID: 46
MDC_IDC_EPISODE_ID: 47
MDC_IDC_EPISODE_ID: 48
MDC_IDC_EPISODE_ID: 49
MDC_IDC_EPISODE_ID: 50
MDC_IDC_EPISODE_ID: 51
MDC_IDC_EPISODE_ID: 52
MDC_IDC_EPISODE_ID: 53
MDC_IDC_EPISODE_ID: 54
MDC_IDC_EPISODE_ID: 55
MDC_IDC_EPISODE_ID: 56
MDC_IDC_EPISODE_ID: 57
MDC_IDC_EPISODE_ID: 58
MDC_IDC_EPISODE_ID: 59
MDC_IDC_EPISODE_ID: 60
MDC_IDC_EPISODE_TYPE: NORMAL
MDC_IDC_LEAD_IMPLANT_DT: NORMAL
MDC_IDC_LEAD_IMPLANT_DT: NORMAL
MDC_IDC_LEAD_LOCATION: NORMAL
MDC_IDC_LEAD_LOCATION: NORMAL
MDC_IDC_LEAD_LOCATION_DETAIL_1: NORMAL
MDC_IDC_LEAD_LOCATION_DETAIL_1: NORMAL
MDC_IDC_LEAD_MFG: NORMAL
MDC_IDC_LEAD_MFG: NORMAL
MDC_IDC_LEAD_MODEL: NORMAL
MDC_IDC_LEAD_MODEL: NORMAL
MDC_IDC_LEAD_POLARITY_TYPE: NORMAL
MDC_IDC_LEAD_POLARITY_TYPE: NORMAL
MDC_IDC_LEAD_SERIAL: NORMAL
MDC_IDC_LEAD_SERIAL: NORMAL
MDC_IDC_MSMT_BATTERY_DTM: NORMAL
MDC_IDC_MSMT_BATTERY_REMAINING_LONGEVITY: 113 MO
MDC_IDC_MSMT_BATTERY_RRT_TRIGGER: 2.73
MDC_IDC_MSMT_BATTERY_STATUS: NORMAL
MDC_IDC_MSMT_BATTERY_VOLTAGE: 3.03 V
MDC_IDC_MSMT_CAP_CHARGE_DTM: NORMAL
MDC_IDC_MSMT_CAP_CHARGE_ENERGY: 18 J
MDC_IDC_MSMT_CAP_CHARGE_TIME: 4.08
MDC_IDC_MSMT_CAP_CHARGE_TYPE: NORMAL
MDC_IDC_MSMT_LEADCHNL_RA_IMPEDANCE_VALUE: 456 OHM
MDC_IDC_MSMT_LEADCHNL_RA_PACING_THRESHOLD_AMPLITUDE: 0.5 V
MDC_IDC_MSMT_LEADCHNL_RA_PACING_THRESHOLD_PULSEWIDTH: 0.4 MS
MDC_IDC_MSMT_LEADCHNL_RA_SENSING_INTR_AMPL: 0.88 MV
MDC_IDC_MSMT_LEADCHNL_RA_SENSING_INTR_AMPL: 1.25 MV
MDC_IDC_MSMT_LEADCHNL_RV_IMPEDANCE_VALUE: 304 OHM
MDC_IDC_MSMT_LEADCHNL_RV_IMPEDANCE_VALUE: 361 OHM
MDC_IDC_MSMT_LEADCHNL_RV_PACING_THRESHOLD_AMPLITUDE: 1 V
MDC_IDC_MSMT_LEADCHNL_RV_PACING_THRESHOLD_PULSEWIDTH: 0.4 MS
MDC_IDC_MSMT_LEADCHNL_RV_SENSING_INTR_AMPL: 7.75 MV
MDC_IDC_MSMT_LEADCHNL_RV_SENSING_INTR_AMPL: 9.5 MV
MDC_IDC_PG_IMPLANT_DTM: NORMAL
MDC_IDC_PG_MFG: NORMAL
MDC_IDC_PG_MODEL: NORMAL
MDC_IDC_PG_SERIAL: NORMAL
MDC_IDC_PG_TYPE: NORMAL
MDC_IDC_SESS_CLINIC_NAME: NORMAL
MDC_IDC_SESS_DTM: NORMAL
MDC_IDC_SESS_TYPE: NORMAL
MDC_IDC_SET_BRADY_AT_MODE_SWITCH_RATE: 171 {BEATS}/MIN
MDC_IDC_SET_BRADY_HYSTRATE: NORMAL
MDC_IDC_SET_BRADY_LOWRATE: 60 {BEATS}/MIN
MDC_IDC_SET_BRADY_MAX_SENSOR_RATE: 130 {BEATS}/MIN
MDC_IDC_SET_BRADY_MAX_TRACKING_RATE: 130 {BEATS}/MIN
MDC_IDC_SET_BRADY_MODE: NORMAL
MDC_IDC_SET_BRADY_PAV_DELAY_LOW: 180 MS
MDC_IDC_SET_BRADY_SAV_DELAY_LOW: 150 MS
MDC_IDC_SET_LEADCHNL_RA_PACING_AMPLITUDE: 1.5 V
MDC_IDC_SET_LEADCHNL_RA_PACING_ANODE_ELECTRODE_1: NORMAL
MDC_IDC_SET_LEADCHNL_RA_PACING_ANODE_LOCATION_1: NORMAL
MDC_IDC_SET_LEADCHNL_RA_PACING_CAPTURE_MODE: NORMAL
MDC_IDC_SET_LEADCHNL_RA_PACING_CATHODE_ELECTRODE_1: NORMAL
MDC_IDC_SET_LEADCHNL_RA_PACING_CATHODE_LOCATION_1: NORMAL
MDC_IDC_SET_LEADCHNL_RA_PACING_POLARITY: NORMAL
MDC_IDC_SET_LEADCHNL_RA_PACING_PULSEWIDTH: 0.4 MS
MDC_IDC_SET_LEADCHNL_RA_SENSING_ANODE_ELECTRODE_1: NORMAL
MDC_IDC_SET_LEADCHNL_RA_SENSING_ANODE_LOCATION_1: NORMAL
MDC_IDC_SET_LEADCHNL_RA_SENSING_CATHODE_ELECTRODE_1: NORMAL
MDC_IDC_SET_LEADCHNL_RA_SENSING_CATHODE_LOCATION_1: NORMAL
MDC_IDC_SET_LEADCHNL_RA_SENSING_POLARITY: NORMAL
MDC_IDC_SET_LEADCHNL_RA_SENSING_SENSITIVITY: 0.3 MV
MDC_IDC_SET_LEADCHNL_RV_PACING_AMPLITUDE: 2 V
MDC_IDC_SET_LEADCHNL_RV_PACING_ANODE_ELECTRODE_1: NORMAL
MDC_IDC_SET_LEADCHNL_RV_PACING_ANODE_LOCATION_1: NORMAL
MDC_IDC_SET_LEADCHNL_RV_PACING_CAPTURE_MODE: NORMAL
MDC_IDC_SET_LEADCHNL_RV_PACING_CATHODE_ELECTRODE_1: NORMAL
MDC_IDC_SET_LEADCHNL_RV_PACING_CATHODE_LOCATION_1: NORMAL
MDC_IDC_SET_LEADCHNL_RV_PACING_POLARITY: NORMAL
MDC_IDC_SET_LEADCHNL_RV_PACING_PULSEWIDTH: 0.4 MS
MDC_IDC_SET_LEADCHNL_RV_SENSING_ANODE_ELECTRODE_1: NORMAL
MDC_IDC_SET_LEADCHNL_RV_SENSING_ANODE_LOCATION_1: NORMAL
MDC_IDC_SET_LEADCHNL_RV_SENSING_CATHODE_ELECTRODE_1: NORMAL
MDC_IDC_SET_LEADCHNL_RV_SENSING_CATHODE_LOCATION_1: NORMAL
MDC_IDC_SET_LEADCHNL_RV_SENSING_POLARITY: NORMAL
MDC_IDC_SET_LEADCHNL_RV_SENSING_SENSITIVITY: 0.45 MV
MDC_IDC_SET_ZONE_DETECTION_BEATS_DENOMINATOR: 24 {BEATS}
MDC_IDC_SET_ZONE_DETECTION_BEATS_NUMERATOR: 18 {BEATS}
MDC_IDC_SET_ZONE_DETECTION_INTERVAL: 300 MS
MDC_IDC_SET_ZONE_DETECTION_INTERVAL: 320 MS
MDC_IDC_SET_ZONE_DETECTION_INTERVAL: 350 MS
MDC_IDC_SET_ZONE_DETECTION_INTERVAL: 430 MS
MDC_IDC_SET_ZONE_DETECTION_INTERVAL: NORMAL
MDC_IDC_SET_ZONE_TYPE: NORMAL
MDC_IDC_STAT_AT_BURDEN_PERCENT: 2.1 %
MDC_IDC_STAT_AT_DTM_END: NORMAL
MDC_IDC_STAT_AT_DTM_START: NORMAL
MDC_IDC_STAT_BRADY_AP_VP_PERCENT: 9.33 %
MDC_IDC_STAT_BRADY_AP_VS_PERCENT: 37.92 %
MDC_IDC_STAT_BRADY_AS_VP_PERCENT: 25.6 %
MDC_IDC_STAT_BRADY_AS_VS_PERCENT: 27.15 %
MDC_IDC_STAT_BRADY_DTM_END: NORMAL
MDC_IDC_STAT_BRADY_DTM_START: NORMAL
MDC_IDC_STAT_BRADY_RA_PERCENT_PACED: 45.7 %
MDC_IDC_STAT_BRADY_RV_PERCENT_PACED: 34.76 %
MDC_IDC_STAT_EPISODE_RECENT_COUNT: 0
MDC_IDC_STAT_EPISODE_RECENT_COUNT: 1
MDC_IDC_STAT_EPISODE_RECENT_COUNT: 140
MDC_IDC_STAT_EPISODE_RECENT_COUNT_DTM_END: NORMAL
MDC_IDC_STAT_EPISODE_RECENT_COUNT_DTM_START: NORMAL
MDC_IDC_STAT_EPISODE_TOTAL_COUNT: 0
MDC_IDC_STAT_EPISODE_TOTAL_COUNT: 1
MDC_IDC_STAT_EPISODE_TOTAL_COUNT: 59
MDC_IDC_STAT_EPISODE_TOTAL_COUNT_DTM_END: NORMAL
MDC_IDC_STAT_EPISODE_TOTAL_COUNT_DTM_START: NORMAL
MDC_IDC_STAT_EPISODE_TYPE: NORMAL
MDC_IDC_STAT_TACHYTHERAPY_ATP_DELIVERED_RECENT: 0
MDC_IDC_STAT_TACHYTHERAPY_ATP_DELIVERED_TOTAL: 0
MDC_IDC_STAT_TACHYTHERAPY_RECENT_DTM_END: NORMAL
MDC_IDC_STAT_TACHYTHERAPY_RECENT_DTM_START: NORMAL
MDC_IDC_STAT_TACHYTHERAPY_SHOCKS_ABORTED_RECENT: 0
MDC_IDC_STAT_TACHYTHERAPY_SHOCKS_ABORTED_TOTAL: 0
MDC_IDC_STAT_TACHYTHERAPY_SHOCKS_DELIVERED_RECENT: 0
MDC_IDC_STAT_TACHYTHERAPY_SHOCKS_DELIVERED_TOTAL: 0
MDC_IDC_STAT_TACHYTHERAPY_TOTAL_DTM_END: NORMAL
MDC_IDC_STAT_TACHYTHERAPY_TOTAL_DTM_START: NORMAL

## 2019-05-15 ENCOUNTER — OFFICE VISIT (OUTPATIENT)
Dept: INTERNAL MEDICINE | Facility: CLINIC | Age: 84
End: 2019-05-15
Payer: COMMERCIAL

## 2019-05-15 VITALS
DIASTOLIC BLOOD PRESSURE: 52 MMHG | WEIGHT: 137.6 LBS | SYSTOLIC BLOOD PRESSURE: 111 MMHG | BODY MASS INDEX: 21.55 KG/M2 | HEART RATE: 62 BPM

## 2019-05-15 DIAGNOSIS — I48.91 ATRIAL FIBRILLATION (H): ICD-10-CM

## 2019-05-15 DIAGNOSIS — S72.002S CLOSED FRACTURE OF LEFT HIP, SEQUELA: Primary | ICD-10-CM

## 2019-05-15 DIAGNOSIS — E78.5 HYPERLIPIDEMIA, UNSPECIFIED HYPERLIPIDEMIA TYPE: ICD-10-CM

## 2019-05-15 LAB — INR PPP: 1.51 (ref 0.86–1.14)

## 2019-05-15 RX ORDER — ATORVASTATIN CALCIUM 40 MG/1
20 TABLET, FILM COATED ORAL DAILY
Qty: 90 TABLET | Refills: 0 | COMMUNITY
Start: 2019-05-15 | End: 2020-01-08

## 2019-05-15 ASSESSMENT — PAIN SCALES - GENERAL: PAINLEVEL: NO PAIN (0)

## 2019-05-15 NOTE — NURSING NOTE
Chief Complaint   Patient presents with     Surgical Followup     pt here following hip surgery       Beth Young CMA at 5:06 PM on 5/15/2019.

## 2019-05-15 NOTE — PATIENT INSTRUCTIONS
[facility] Medication Refill Request Information:  * Please contact your pharmacy regarding ANY request for medication refills.  ** [facility] Prescription Fax = [phone]  * Please allow 3 business days for routine medication refills.  * Please allow 5 business days for controlled substance medication refills.     [facility] Test Result notification information:  *You will be notified with in 7-10 days of your appointment day regarding the results of your test.  If you are on MyChart you will be notified as soon as the provider has reviewed the results and signed off on them.    [facility]: [phone]

## 2019-05-15 NOTE — PROGRESS NOTES
HPI  83-year-old returns today for follow-up with his wife.  He is making good progress after his hip fracture.  He is now ambulating with the use of a walker and occasionally ambulating with the use of a cane.  He is managing to climb stairs with a cane without significant difficulty.  His home therapy is expiring and he is looking to go into rehab for continued strengthening and exercise.  He is appetite remains poor and his weight is down some.  He is taking the boost supplements 1 a day and we talked about increasing this to twice a day and we talked about increasing his protein intake and methods to accomplish this.  He said no muscle stiffness or aching he still gets depressed periodically and at times.  This is associated with despondency and he still needs to be on the sertraline and the mirtazapine for this by their report.  His voiding is improved he is having less urinary incontinence appears to be tolerating the tamsulosin well.  Past Medical History:   Diagnosis Date     Advanced open-angle glaucoma      Atrial fibrillation (H)      CKD (chronic kidney disease), stage III (H) 2005     Colon cancer (H)     Stage II-B colon cancer     Coronary artery disease     s/p CABG x 2, JEREMY x 2     Diverticulitis      Hyperlipidemia      Hypertension      Ischemic cardiomyopathy      MGUS (monoclonal gammopathy of unknown significance)      Nonsenile cataract     BE     Polymorphic ventricular tachycardia (H)      Polymyalgia rheumatica (H)      PVD (posterior vitreous detachment), both eyes 2005     S/P ablation of atrial flutter 6/20/14    CTI     Stented coronary artery      SVT (supraventricular tachycardia) (H)     PPM/AICD for NSVT     Upper leg DVT (deep venous thromboembolism), chronic (H)     Left     Weight loss, unintentional 2017    15 lb in 4 months     Past Surgical History:   Procedure Laterality Date     C CABG, ARTERY-VEIN, FOUR  2006    LIMA-LAD, SVG-Rt PDA, SVG-OM2, SVG-Diag 1     C CABG, VEIN,  SINGLE  1994    1-vessel CABG, SVG->PDRCA      CATARACT IOL, RT/LT Bilateral      COLONOSCOPY  3/13/2014    Procedure: COMBINED COLONOSCOPY, SINGLE BIOPSY/POLYPECTOMY BY BIOPSY;;  Surgeon: Mary Gerber MD;  Location: [facility]     COLONOSCOPY N/A 6/22/2015    Procedure: COLONOSCOPY;  Surgeon: Marilin Newman MD;  [facility]     COLONOSCOPY N/A 11/7/2018    Procedure: COMBINED COLONOSCOPY, SINGLE OR MULTIPLE BIOPSY/POLYPECTOMY BY BIOPSY;  Surgeon: Roberto Esteban MD;  Location: [facility]     EP ICD GENERATOR CHANGE DUAL N/A 12/11/2018    Procedure: EP ICD Generator Change Dual;  Surgeon: Deedee aBird MD;  Location:  [facility] CATH LAB     H ABLATION ATRIAL FLUTTER       HEART CATH DRUG ELUTING STENT PLACEMENT  4/19/2012    JEREMY x 2 to LCx     IMPLANT IMPLANTABLE CARDIOVERTER DEFIBRILLATOR  5-    ppm/aicd     KNEE SURGERY      right and left knee surgeries     LAPAROSCOPIC ASSISTED COLECTOMY Right 2/1/2019    Procedure: Laparoscopic Converted to Open Transverse Colectomy with Lysis of Adhesions;  Surgeon: Chanelle Guzmán MD;  Location:  OR     LAPAROSCOPIC ASSISTED COLECTOMY LEFT (DESCENDING)  4/8/2014    Procedure: LAPAROSCOPIC ASSISTED COLECTOMY LEFT (DESCENDING);  Hand assisted Laparoscopic Sigmoid Colectomy , Laparoscopic mobilization of spleenic flexure *Latex Allergy*Anesthesia General with Block;  Surgeon: Chanelle Guzmán MD;  [facility]     OPEN REDUCTION INTERNAL FIXATION RODDING INTRAMEDULLAR FEMUR FRACTURE TABLE Left 3/14/2019    Procedure: Open Reduction Internal Fixation Left Femur Intramedullar Nailing;  Surgeon: Srikanth Avalos MD;  Location:  OR     REPAIR VALVE MITRAL  2006     30-mm Medtronic Julian ring      SELECTIVE LASER TRABECULOPLASTY (SLT) OD (RIGHT EYE)  4/10, 1/12,+1/9/13    RE     SELECTIVE LASER TRABECULOPLASTY (SLT) OS (LEFT EYE)  5/2012     SHOULDER SURGERY      right rotator cuff     Family History   Problem  Relation Age of Onset     C.A.DAgnieszka Father      Anesthesia Reaction No family hx of      Crohn's Disease No family hx of      Ulcerative Colitis No family hx of      Cancer - colorectal No family hx of      Macular Degeneration No family hx of      Cancer No family hx of         No known family hx of skin cancer     Melanoma No family hx of      Skin Cancer No family hx of      Social History     Socioeconomic History     Marital status:      Spouse name: None     Number of children: None     Years of education: None     Highest education level: None   Occupational History     None   Social Needs     Financial resource strain: None     Food insecurity:     Worry: None     Inability: None     Transportation needs:     Medical: None     Non-medical: None   Tobacco Use     Smoking status: Former Smoker     Types: Cigarettes, Cigars     Last attempt to quit: 1968     Years since quittin.4     Smokeless tobacco: Never Used   Substance and Sexual Activity     Alcohol use: Yes     Alcohol/week: 0.0 oz     Comment: 2-3 drinks twice weekly     Drug use: No     Sexual activity: None   Lifestyle     Physical activity:     Days per week: None     Minutes per session: None     Stress: None   Relationships     Social connections:     Talks on phone: None     Gets together: None     Attends Sabianism service: None     Active member of club or organization: None     Attends meetings of clubs or organizations: None     Relationship status: None     Intimate partner violence:     Fear of current or ex partner: None     Emotionally abused: None     Physically abused: None     Forced sexual activity: None   Other Topics Concern     Parent/sibling w/ CABG, MI or angioplasty before 65F 55M? Not Asked   Social History Narrative     None       Exam:  /52 (BP Location: Right arm, Patient Position: Sitting, Cuff Size: Adult Regular)   Pulse 62   Wt 62.4 kg (137 lb 9.6 oz)   BMI 21.55 kg/m    137 lbs 9.6 oz  The patient  is alert, oriented with a clear sensorium.   Skin shows no lesions or rashes and good turgor.   Head is normocephalic and atraumatic.    Lungs are clear.   Heart shows normal S1 and S2 without murmur or gallop.    Extremities show no edema.    ASSESSMENT  1 status post left hip fracture doing well  2 weight loss secondary to anorexia needs improved nutritional intake  3 hypertension extremely well controlled  4 hyperlipidemia extremely well controlled on atorvastatin  5 chronic kidney disease stable  6 coronary artery disease asymptomatic  7 history of ischemic cardiomyopathy    Plan  He has multiple appointment scheduled over the next couple of months.  We will have the increase the boost to twice a day increase the protein intake in his diet encouraged him regarding his physical activity and hoping to get him back to tennis by the end of the summer.  We will reduce his atorvastatin down to 20 mg daily.      Over 25 minutes spent with patient the majority in counseling and coordinating care.    This note was completed using Dragon voice recognition software.  Although reviewed after completion, some word and grammatical errors may occur.    Roberto Sarmiento MD  General Internal Medicine  [facility]  [phone]

## 2019-05-16 ENCOUNTER — ANTICOAGULATION THERAPY VISIT (OUTPATIENT)
Dept: ANTICOAGULATION | Facility: CLINIC | Age: 84
End: 2019-05-16

## 2019-05-16 DIAGNOSIS — I48.20 CHRONIC ATRIAL FIBRILLATION (H): ICD-10-CM

## 2019-05-16 NOTE — Clinical Note
Update from [facility]. INR from 5/15 is subtherapeutic.  Did not recommend Lovenox injections.  Patient recent recovery from surgery.  Please reach out to Coumadin clinic if changes need to be made.  Thank you. SASHA, [facility] RN

## 2019-05-16 NOTE — PROGRESS NOTES
ANTICOAGULATION FOLLOW-UP CLINIC VISIT    Patient Name:  Noe Florence  Date:  2019  Contact Type:  Telephone    SUBJECTIVE:  Patient Findings     Comments:   Left message for patient on S.O. Rica's voicemail.  Requested a call back with any missed doses.  Sent encounter to Dr. Sarmiento.  Patient had a recent hip surgery, see notes from 5/15.        Clinical Outcomes     Comments:   Left message for patient on S.O. Rica's voicemail.  Requested a call back with any missed doses.  Sent encounter to Dr. Sarmiento.  Patient had a recent hip surgery, see notes from 5/15.           OBJECTIVE    INR   Date Value Ref Range Status   05/15/2019 1.51 (H) 0.86 - 1.14 Final       ASSESSMENT / PLAN  INR assessment SUB    Recheck INR In: 1 WEEK    INR Location Clinic      Anticoagulation Summary  As of 2019    INR goal:   2.0-3.0   TTR:   66.3 % (2.8 y)   INR used for dosin.51! (5/15/2019)   Warfarin maintenance plan:   5 mg (5 mg x 1) every day   Full warfarin instructions:   : 7.5 mg; Otherwise 5 mg every day   Weekly warfarin total:   35 mg   Plan last modified:   Ciro Sharp RN (2019)   Next INR check:   2019   Priority:   INR   Target end date:   Indefinite    Indications    Atrial fibrillation (H) [I48.91] [I48.91]  Long-term (current) use of anticoagulants [Z79.01] [Z79.01]             Anticoagulation Episode Summary     INR check location:       Preferred lab:       Send INR reminders to:   [facility]    Comments:   Patient contact number: [phone]   Can leave results with Rica (friend)  19 Pt. discharged from homecare.      Anticoagulation Care Providers     Provider Role Specialty Phone number    Deedee Baird MD Responsible Cardiology [phone]            See the Encounter Report to view Anticoagulation Flowsheet and Dosing Calendar (Go to Encounters tab in chart review, and find the Anticoagulation Therapy Visit)   Left message for patient with results  and dosing recommendations. Asked patient to call back to report any missed doses, falls, signs and symptoms of bleeding or clotting, any changes in health, medication, or diet. Asked patient to call back with any questions or concerns.      2:50PM  Rica returned call.  No missed doses.  Patient has been eating a more significant amount of Vitamin K.  Reviewed diet interactions and answered Rica's questions about leafy greens.  Verbalized that the diet will be more consistent in the future.  Ciro Sharp, RN

## 2019-05-22 ENCOUNTER — TELEPHONE (OUTPATIENT)
Dept: INTERNAL MEDICINE | Facility: CLINIC | Age: 84
End: 2019-05-22

## 2019-05-22 ENCOUNTER — MEDICAL CORRESPONDENCE (OUTPATIENT)
Dept: HEALTH INFORMATION MANAGEMENT | Facility: CLINIC | Age: 84
End: 2019-05-22

## 2019-05-22 ENCOUNTER — TRANSFERRED RECORDS (OUTPATIENT)
Dept: HEALTH INFORMATION MANAGEMENT | Facility: CLINIC | Age: 84
End: 2019-05-22

## 2019-05-22 NOTE — TELEPHONE ENCOUNTER
[facility] Call Center    Phone Message    May a detailed message be left on voicemail: yes    Reason for Call: Order(s): Home Care Orders: Physical Therapy (PT): .1 visit next week.    Action Taken: Message routed to:  [facility]     Verbal order given and documented. Saray Middleton LPN 5/22/2019 2:53 PM

## 2019-05-22 NOTE — TELEPHONE ENCOUNTER
Verbal orders given to Antione from [facility], per , for Physical Therapy: 1 visit next week.Saray Middleton LPN 5/22/2019 2:54 PM

## 2019-05-27 ENCOUNTER — NURSING HOME VISIT (OUTPATIENT)
Dept: GERIATRICS | Facility: CLINIC | Age: 84
End: 2019-05-27
Payer: COMMERCIAL

## 2019-05-27 VITALS
WEIGHT: 143.2 LBS | DIASTOLIC BLOOD PRESSURE: 58 MMHG | HEART RATE: 60 BPM | OXYGEN SATURATION: 96 % | BODY MASS INDEX: 22.47 KG/M2 | TEMPERATURE: 97.3 F | HEIGHT: 67 IN | RESPIRATION RATE: 18 BRPM | SYSTOLIC BLOOD PRESSURE: 109 MMHG

## 2019-05-27 DIAGNOSIS — Z91.81 PERSONAL HISTORY OF FALL: ICD-10-CM

## 2019-05-27 DIAGNOSIS — S42.401D CLOSED FRACTURE OF DISTAL END OF RIGHT HUMERUS WITH ROUTINE HEALING, UNSPECIFIED FRACTURE MORPHOLOGY, SUBSEQUENT ENCOUNTER: Primary | ICD-10-CM

## 2019-05-27 DIAGNOSIS — Z98.890 S/P ABLATION OF ATRIAL FLUTTER: ICD-10-CM

## 2019-05-27 DIAGNOSIS — S01.01XD LACERATION OF SCALP, SUBSEQUENT ENCOUNTER: ICD-10-CM

## 2019-05-27 DIAGNOSIS — Z86.79 S/P ABLATION OF ATRIAL FLUTTER: ICD-10-CM

## 2019-05-27 DIAGNOSIS — F32.A DEPRESSION, UNSPECIFIED DEPRESSION TYPE: ICD-10-CM

## 2019-05-27 DIAGNOSIS — Z87.81 HX OF FRACTURE OF FEMUR: ICD-10-CM

## 2019-05-27 DIAGNOSIS — D64.9 ANEMIA, UNSPECIFIED TYPE: ICD-10-CM

## 2019-05-27 DIAGNOSIS — M62.81 GENERALIZED MUSCLE WEAKNESS: ICD-10-CM

## 2019-05-27 DIAGNOSIS — N18.30 CKD (CHRONIC KIDNEY DISEASE), STAGE III (H): ICD-10-CM

## 2019-05-27 DIAGNOSIS — R63.4 WEIGHT LOSS, UNINTENTIONAL: ICD-10-CM

## 2019-05-27 DIAGNOSIS — R53.81 PHYSICAL DECONDITIONING: ICD-10-CM

## 2019-05-27 DIAGNOSIS — R39.15 URINARY URGENCY: ICD-10-CM

## 2019-05-27 DIAGNOSIS — C18.4 MALIGNANT NEOPLASM OF TRANSVERSE COLON (H): ICD-10-CM

## 2019-05-27 DIAGNOSIS — I25.10 CORONARY ARTERY DISEASE INVOLVING NATIVE CORONARY ARTERY OF NATIVE HEART, ANGINA PRESENCE UNSPECIFIED: ICD-10-CM

## 2019-05-27 DIAGNOSIS — N40.1 BENIGN PROSTATIC HYPERPLASIA WITH URINARY FREQUENCY: ICD-10-CM

## 2019-05-27 DIAGNOSIS — I48.20 CHRONIC ATRIAL FIBRILLATION (H): ICD-10-CM

## 2019-05-27 DIAGNOSIS — Z86.718 HISTORY OF DEEP VENOUS THROMBOSIS: ICD-10-CM

## 2019-05-27 DIAGNOSIS — Z95.1 S/P CABG (CORONARY ARTERY BYPASS GRAFT): ICD-10-CM

## 2019-05-27 DIAGNOSIS — I10 HYPERTENSION, UNSPECIFIED TYPE: ICD-10-CM

## 2019-05-27 DIAGNOSIS — E78.5 HYPERLIPIDEMIA, UNSPECIFIED HYPERLIPIDEMIA TYPE: ICD-10-CM

## 2019-05-27 DIAGNOSIS — R35.0 BENIGN PROSTATIC HYPERPLASIA WITH URINARY FREQUENCY: ICD-10-CM

## 2019-05-27 DIAGNOSIS — I25.5 ISCHEMIC CARDIOMYOPATHY: ICD-10-CM

## 2019-05-27 DIAGNOSIS — R26.89 MULTIFACTORIAL GAIT DISORDER: ICD-10-CM

## 2019-05-27 PROCEDURE — 99310 SBSQ NF CARE HIGH MDM 45: CPT | Performed by: NURSE PRACTITIONER

## 2019-05-27 RX ORDER — CALCIUM CARBONATE 500 MG/1
1 TABLET, CHEWABLE ORAL 2 TIMES DAILY
COMMUNITY
End: 2019-08-19 | Stop reason: ALTCHOICE

## 2019-05-27 RX ORDER — OXYCODONE HYDROCHLORIDE 5 MG/1
5 TABLET ORAL EVERY 4 HOURS PRN
Qty: 60 TABLET | Refills: 0 | Status: SHIPPED | OUTPATIENT
Start: 2019-05-27 | End: 2019-06-10

## 2019-05-27 RX ORDER — ACETAMINOPHEN 325 MG/1
650 TABLET ORAL EVERY 4 HOURS PRN
COMMUNITY
End: 2019-05-27

## 2019-05-27 RX ORDER — ACETAMINOPHEN 500 MG
1000 TABLET ORAL 3 TIMES DAILY
Start: 2019-05-27 | End: 2019-06-10

## 2019-05-27 RX ORDER — OXYCODONE HYDROCHLORIDE 5 MG/1
5 TABLET ORAL EVERY 4 HOURS PRN
COMMUNITY
End: 2019-05-27

## 2019-05-27 ASSESSMENT — MIFFLIN-ST. JEOR: SCORE: 1298.18

## 2019-05-27 NOTE — PROGRESS NOTES
[facility]ER[facility]ATR[facility]  PRIMARY CARE PROVIDER AND CLINIC:  Roberto Sarmiento MD, [address] [facility] [address]  Chief Complaint   Patient presents with     Hospital F/U     [facility] Medical Record Number:  [id number]  Place of Service where encounter took place:  [facility] [[id number]]    Noe Florence  is a 84 year old  (1935), admitted to the above facility from  [facility]. Hospital stay 5/22/19 through 5/25/19..  Admitted to this facility for  rehab, medical management and nursing care.    HPI:    HPI information obtained from: facility chart records, facility staff, patient report, [facility] Epic chart review, Care Everywhere Epic chart review and family/first contact pt's wife's report.     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  His humerus fracture was reduced in the ED, Ortho was consulted and ultimately decision made to treat medically with splint and f/u with Ortho for likely cast.  Discussion with family and patient about OA and decision made to stop warfarin until f/u with Cardiology.  Scalp laceration was reportedly sutured with expected removal date 6/1/19 without sutures in place at today's visit.        Updates on Status Since Skilled nursing Admission:   Patient is met today in his TCU room with his wife, who is his primary historian.  He reports RUE pain, mild at rest, extreme with movement.  He reports no pain in his left hip at rest but increased with activity.  He c/o urinary urgency and frequency which is  chronic since his femur fracture in March, 2019.  He denies SOB, CP, HA, lightheadedness, heartburn, upset stomach, constipation.      His wife is worried about all the specialist f/u appts and mobility to these appts.  We discussed which would be most important to keep and which could wait.  She also is concerned about his weight loss and is requesting Boost supplemental drinks but ones without milk products.     CODE STATUS/ADVANCE DIRECTIVES DISCUSSION:   CPR/Full code   Patient's living condition: lives with spouse  ALLERGIES: Latex  PAST MEDICAL HISTORY:  has a past medical history of Advanced open-angle glaucoma, Atrial fibrillation (H), CKD (chronic kidney disease), stage III (H) (2005), Colon cancer (H), Coronary artery disease, Diverticulitis, Hyperlipidemia, Hypertension, Ischemic cardiomyopathy, MGUS (monoclonal gammopathy of unknown significance), Nonsenile cataract, Polymorphic ventricular tachycardia (H), Polymyalgia rheumatica (H), PVD (posterior vitreous detachment), both eyes (2005), S/P ablation of atrial flutter (6/20/14), Stented coronary artery, SVT (supraventricular tachycardia) (H), Upper leg DVT (deep venous thromboembolism), chronic (H), and Weight loss, unintentional (2017).  PAST SURGICAL HISTORY:   has a past surgical history that includes CABG, ARTERY-VEIN, FOUR (2006); Repair valve mitral (2006); Heart Cath Drug eluting stent placement (4/19/2012); CABG, VEIN, SINGLE (1994); Implant implantable cardioverter defibrillator (5-); knee surgery; shoulder surgery; Colonoscopy (3/13/2014); Selective Laser Trabeculoplasty (SLT) OD (right eye) (4/10, 1/12,+1/9/13); Selective Laser Trabeculoplasty (SLT) OS (left eye) (5/2012); Laparoscopic assisted colectomy left (descending) (4/8/2014); Colonoscopy (N/A, 6/22/2015); cataract iol, rt/lt (Bilateral); EP Ablation atrial flutter; Colonoscopy (N/A, 11/7/2018); EP ICD Generator Change Dual (N/A, 12/11/2018); Laparoscopic assisted colectomy  "(Right, 2/1/2019); and Open reduction internal fixation rodding intramedullar femur fracture table (Left, 3/14/2019).  FAMILY HISTORY: family history includes C.A.D. in his father.  SOCIAL HISTORY:   reports that he quit smoking about 51 years ago. His smoking use included cigarettes and cigars. He has never used smokeless tobacco. He reports that he drinks alcohol. He reports that he does not use drugs.    Post Discharge Medication Reconciliation Status: discharge medications reconciled, continue medications without change    Current Outpatient Medications   Medication Sig Dispense Refill     acetaminophen (TYLENOL) 500 MG tablet Take 2 tablets (1,000 mg) by mouth 3 times daily       aspirin 81 MG tablet Take 1 tablet by mouth daily.       atorvastatin (LIPITOR) 40 MG tablet Take 0.5 tablets (20 mg) by mouth daily as directed. 90 tablet 0     calcium carbonate (TUMS) 500 MG chewable tablet Take 1 chew tab by mouth 2 times daily       cholecalciferol 1000 units TABS Take 1,000 Units by mouth daily        metoprolol tartrate (LOPRESSOR) 25 MG tablet Take 12.5 mg by mouth 2 times daily  180 tablet 3     mirtazapine (REMERON) 15 MG tablet Take 15 mg by mouth At Bedtime.       oxyCODONE (ROXICODONE) 5 MG tablet Take 1 tablet (5 mg) by mouth every 4 hours as needed for severe pain 60 tablet 0     sertraline (ZOLOFT) 100 MG tablet Take 150 mg by mouth every evening        tamsulosin (FLOMAX) 0.4 MG capsule Take 1 capsule (0.4 mg) by mouth daily 30 capsule 3       ROS:  10 point ROS of systems including Constitutional, Eyes, Respiratory, Cardiovascular, Gastroenterology, Genitourinary, Integumentary, Musculoskeletal, Psychiatric were all negative except for pertinent positives noted in my HPI.    Vitals:  /58   Pulse 60   Temp 97.3  F (36.3  C)   Resp 18   Ht 1.702 m (5' 7\")   Wt 65 kg (143 lb 3.2 oz)   SpO2 96%   BMI 22.43 kg/m    Exam:  GENERAL APPEARANCE:  Alert, in no distress, frail, deconditioned  RESP:  " respiratory effort and palpation of chest normal, auscultation of lungs mikey, no respiratory distress  CV:  Palpation and auscultation of heart done , rate and rhythm regular, no murmur, no RLE peripheral edema, mild LLE edema, PVD changes present  ABDOMEN:  normal bowel sounds, soft, nontender, no hepatosplenomegaly or other masses but assessment limited by clothed, seated assessment   M/S:   Gait and station with W/C for mobility, has LLE ankle brace in place, RUE splint with sling in place, Digits and nails with significant arthritic changes, significantly reduced muscle mass  SKIN:  Inspection and Palpation of skin and subcutaneous tissue with scalp laceration with foam dressing in place, no drainage, no sutures in place, no s/s of infection.  Mild generalized bruising to skin also in place.   PSYCH:  insight and judgement, memory with impairment present , affect and mood normal,  follows commands readily       Lab/Diagnostic data:  reviewed today by me in Care Everywhere    ASSESSMENT/PLAN:  Closed fracture of distal end of right humerus with routine healing, unspecified fracture morphology, subsequent encounter  Personal history of fall  S/p fall, RUE humerus fracture with reduction in ED  Ortho consulted and after RvB, medical management treatment plan in place with splint and sling, f/u soon for likely cast.    + CMS today with +1-2 radial pulse, noted swelling to hand, assisted with repositioning  On pillow for elevation.     Analgesia with Tylenol 250 mg q8 hours scheduled, 650 mg q4 hours PRN, (new) oxycodone 5 mg q4 hours PRN.     Patient reports mild pain at rest, much increased pain with movement. Patient's wife requesting change to analgesia.    PLAN:  - f/u 5/29/19 with Ortho for likely cast and imagining  - (new) Tylenol 1000 mg TID  - (new) oxycodone 5 mg q4 hours PRN  - splint and sling in place until f/u with Ortho  - elevate hand above heart as able for reduction of swelling  - NWB to RUE      Laceration of scalp, subsequent encounter  Head CT neg in ED for acute pathology  discharge summary reports sutures placed in ED, to be removed 6/1/19.  Visit today without sutures present. Erythema noted under foam dressing.     PLAN:  - monitor for sutures beneath swelling but at this time, none noted.   - monitor for s/s of infection    CKD (chronic kidney disease), stage III (H)  BL Creat around 1.2  Mild ARSALAN on CKD this hospitalization  Last few BMPs:   5/22/19: BUN x, Creat 1.42, GFR 45  5/23/19: BUN 58, Creat 1.34, GFR 48  5/24/19: BUN 52, Creat 1.36, GFR 47    PLAN:  - Will avoid nephrotoxic agents (such as ACEI/ARB/NSAIDs, IV contrast) and mindful prescribing with renal dosing.   - monitor BMP for stability    Chronic atrial fibrillation (H)  S/P ablation of atrial flutter  F/B: Cardiology, ROCIO Manley and Jaye Alexander CNP  S/p CTI (6/2014), right atrial appendage atrial tach ablation (9/2014), ICD placement (5/2012 - [employer] dual lead ICD)     RvB of OA discussed this hosp after fall and scalp laceration and decision to hold warfarin at this time until f/u with Cardiology  Remains on PTA ASA 81 mg daily    On PTA Metoprolol 12.5 mg BID for rate control  HRs 60-73, regular today    PLAN:  - PTA warfarin on hold until Cardiology f/u  - PTA ASA 81 mg daily remains  - PTA metoprolol 12.5 mg BID remains  - monitor for rate control.   - f/u with device check as scheduled.     Coronary artery disease involving native coronary artery of native heart, angina presence unspecified  S/P CABG (coronary artery bypass graft) x 2  Ischemic cardiomyopathy  Hypertension, unspecified type  Hyperlipidemia, unspecified hyperlipidemia type  F/B: Cardiology, ROCIO Manley and Jaye Alexander CNP  s/p CABG x 2, JEREMY x 2, Vtach s/p ICD placement (5, 2012, generator change 12/2018), a fib s/p CTI (6/2014) and ablation (9/2014)    On PTA ASA 81 mg daily, atorvastatin 20 mg daily, metoprolol 12.5 mg BID    BPs mostly  100-120s/50-60s  HRs 60-73, regular today  Patient denies CP, HA, lightheadedness     PLAN:  - continue PTA ASA, statin, metoprolol  - monitor for titration needs  - may anticipate orthostatic BP monitoring since patient has had falls  - f/u with Cardiology as planned     Hx of fracture of left femur s/p IM nailing (3/14/19)  F/b Dr Avalos, [facility]  Has been working with therapy since fracture but has trouble with ambulation, now s/p fall    Analgesia as above - changing today.   No pain on exam today with palpation     PLAN:  - f/u with Dr Avalos, 6/11/19 as planned (if still in TCU, likely this can wait)  - Physical Therapy, Occupational Therapy for strengthening, mobility, etc.   - analgesia as above     History of Left mid femoral vein nonocclusive thrombus  Dx'd 2009; on coumadin for a period of time  Rediscovered during femur fracture hospitalization; patient didn't know that the DVT has not resolved.   US 3/18 showed possible acute on chronic DVT - Teds added    Now off Coumadin.     PLAN:  - PTA ASA 81 gm daily remains  - monitor for leg pain, swelling, s/s of DVT.     Malignant neoplasm of transverse colon (H)  F/b: Dr Gilliam, Medical Oncology  S/p prior sigmoid colon cancer s/p sigmoid colectomy, then new transverse colon cancer s/p lap--> open transverse colectomy, lysis of adhesions 2/1/19    PLAN:  - f/u with Dr Gilliam 6/10/19 as planned (closer to time of appt, can determine patient's mobility and can send message to Dr Gilliam re timing, if needed)  - Colorectal Surgeon recommending colonoscopies every year because of metachronous second colon cancer in only 4 years.      Benign prostatic hyperplasia with urinary frequency  Urinary urgency  Significant difficulties with urinary frequency and urgency which patient reports has been sent his femur fracture and March, 2019.  Previously was on doxazosin which was DC'd as thought ineffective.  Was seen by urology 4/10/19 Dr Lowe who DC'd doxazosin and started  tamsulosin 0.4 mg daily.  Patient's wife reporting this has helped mildly with only having to get up 3 times a night now versus every 1.5 hours prior to medication.    Patient continues to report symptoms.  5/23/19 WBC 14.07 --> 8.87 on 5/24 5/23/19 UA neg    PLAN:  - will r/u UTI with UA/UC before increasing medication  - f/u with Urology as planned who noted possible need for UDT to eval bladder function and/or level of outlet dysfunction.    - Physical Therapy, Occupational Therapy for bladder program     Weight loss, unintentional  Wt Readings from Last 2 Encounters:   05/27/19 65 kg (143 lb 3.2 oz)   05/15/19 62.4 kg (137 lb 9.6 oz)     Patient has had significant recent history of multiple surgeries and hospitalizations.  Was started on boost supplements by PCP  Discussion today with patient's wife regarding supplements who report patient does not have milk    PLAN:  - Boost Breeze (juice-based) BID between meals  - monitor weights    Anemia, unspecified type  Baseline Hgb noted to be 7-9 recently  Had surgery for colon cancer in Feb, 2019  Had surgery for left femur fracture March, 2019 s/p transfusions   Primary Care Provider noted long history of anemia (prior to surgeries)     1/2017:  Ferritin 46  Folate 29.7  Iron 75  Iron binding 311  Iron Sat Index 24  Most recent Vitamin B12 - 2782    5/22/19 - hgb 9.3  5/23/19 - 8.7  5/24/19 - Hgb 8.0 (no surgery this last hospitalization)  Warfarin DC'd (remains on PTA ASA)    No bleeding noted today    PLAN:  - recheck Hgb for stability   - goal Hgb >7.0  - will also recheck iron studies since drawing labs to assess need for Fe sulfate supplementation.     Depression  PTA mirtazapine 15 mg q HS, sertraline 250 mg q HS.   Significant health concerns with various hospitalizations recently.    PLAN:  - monitor for PHQ9 results at [facility]  - consider in-house psych consult      Multifactorial gait disorder  Chronic left foot drop, probable L4-S1 radiculopathy and left  peroneal neuropathy.  F/u Neurology, Dr Jase Duarte, [facility]  Wears soft brace on left foot/ankle.     PLAN:  - Physical Therapy, Occupational Therapy for strengthening, mobility, etc.     Generalized muscle weakness  Physical deconditioning  Various acute illness and surgeries with hospitalizations on chronic disease conditions.   Extensive deconditioning with weight loss and overall decline    PLAN:  - Physical Therapy, Occupational Therapy for strengthening, mobility, etc.        Orders written by provider at facility  1. discontinue all Tylenol orders  2. Tylenol 1000 mg TID Dx: pain  3. UA/UC Dx: urinary urgency, frequency, BPH  4. Boost Breeze (high protein) BID between meals Dx: weight loss  5. BMP, Hgb, iron studies on 5/30/19. Dx: anemia, CKD,    Total time spent with patient visit at the skilled nursing facility was >45 min including patient visit, review of past records and coordination of care with pt's wife and nursing. Greater than 50% of total time spent with counseling and coordinating care due to review of records, patient visit with discussion re POC with pt's wife and pt, coordination of care with nursing     Electronically signed by:  MARIA LUISA Villanueva CNP

## 2019-05-27 NOTE — LETTER
5/27/2019        RE: Noe Florence  [address] [facility] [address]        [facility]  PRIMARY CARE PROVIDER AND CLINIC:  Roberto Sarmiento MD, [address] [facility] [address]  Chief Complaint   Patient presents with     Hospital F/U     [place] [facility] Record Number:  [id number]  Place of Service where encounter took place[facility]  [id number]    Noe Florence  is a 84 year old  (1935), admitted to the above facility from  [facility] Hospital stay 5/22/19 through 5/25/19..  Admitted to this facility for  rehab, medical management and nursing care.    HPI:    HPI information obtained from: facility chart records, facility staff, patient report, [facility] chart review, Care Everywhere Epic chart review and family/first contact pt's wife's report.     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  His humerus fracture was reduced in the ED, Ortho was consulted and ultimately decision made to treat medically with splint and f/u with Ortho for likely cast.  Discussion with family and patient about OA and decision made to stop warfarin until f/u with Cardiology.  Scalp laceration was reportedly sutured with expected removal date 6/1/19 without sutures in place at today's visit.        Updates on Status Since Skilled nursing Admission:   Patient is met today in his TCU room with his wife, who is his primary historian.  He reports RUE pain, mild at rest, extreme with movement.  He reports no pain in his left  hip at rest but increased with activity.  He c/o urinary urgency and frequency which is chronic since his femur fracture in March, 2019.  He denies SOB, CP, HA, lightheadedness, heartburn, upset stomach, constipation.      His wife is worried about all the specialist f/u appts and mobility to these appts.  We discussed which would be most important to keep and which could wait.  She also is concerned about his weight loss and is requesting Boost supplemental drinks but ones without milk products.     CODE STATUS/ADVANCE DIRECTIVES DISCUSSION:   CPR/Full code   Patient's living condition: lives with spouse  ALLERGIES: Latex  PAST MEDICAL HISTORY:  has a past medical history of Advanced open-angle glaucoma, Atrial fibrillation (H), CKD (chronic kidney disease), stage III (H) (2005), Colon cancer (H), Coronary artery disease, Diverticulitis, Hyperlipidemia, Hypertension, Ischemic cardiomyopathy, MGUS (monoclonal gammopathy of unknown significance), Nonsenile cataract, Polymorphic ventricular tachycardia (H), Polymyalgia rheumatica (H), PVD (posterior vitreous detachment), both eyes (2005), S/P ablation of atrial flutter (6/20/14), Stented coronary artery, SVT (supraventricular tachycardia) (H), Upper leg DVT (deep venous thromboembolism), chronic (H), and Weight loss, unintentional (2017).  PAST SURGICAL HISTORY:   has a past surgical history that includes CABG, ARTERY-VEIN, FOUR (2006); Repair valve mitral (2006); Heart Cath Drug eluting stent placement (4/19/2012); CABG, VEIN, SINGLE (1994); Implant implantable cardioverter defibrillator (5-); knee surgery; shoulder surgery; Colonoscopy (3/13/2014); Selective Laser Trabeculoplasty (SLT) OD (right eye) (4/10, 1/12,+1/9/13); Selective Laser Trabeculoplasty (SLT) OS (left eye) (5/2012); Laparoscopic assisted colectomy left (descending) (4/8/2014); Colonoscopy (N/A, 6/22/2015); cataract iol, rt/lt (Bilateral); EP Ablation atrial flutter; Colonoscopy (N/A,  "11/7/2018); EP ICD Generator Change Dual (N/A, 12/11/2018); Laparoscopic assisted colectomy (Right, 2/1/2019); and Open reduction internal fixation rodding intramedullar femur fracture table (Left, 3/14/2019).  FAMILY HISTORY: family history includes C.A.D. in his father.  SOCIAL HISTORY:   reports that he quit smoking about 51 years ago. His smoking use included cigarettes and cigars. He has never used smokeless tobacco. He reports that he drinks alcohol. He reports that he does not use drugs.    Post Discharge Medication Reconciliation Status: discharge medications reconciled, continue medications without change    Current Outpatient Medications   Medication Sig Dispense Refill     acetaminophen (TYLENOL) 500 MG tablet Take 2 tablets (1,000 mg) by mouth 3 times daily       aspirin 81 MG tablet Take 1 tablet by mouth daily.       atorvastatin (LIPITOR) 40 MG tablet Take 0.5 tablets (20 mg) by mouth daily as directed. 90 tablet 0     calcium carbonate (TUMS) 500 MG chewable tablet Take 1 chew tab by mouth 2 times daily       cholecalciferol 1000 units TABS Take 1,000 Units by mouth daily        metoprolol tartrate (LOPRESSOR) 25 MG tablet Take 12.5 mg by mouth 2 times daily  180 tablet 3     mirtazapine (REMERON) 15 MG tablet Take 15 mg by mouth At Bedtime.       oxyCODONE (ROXICODONE) 5 MG tablet Take 1 tablet (5 mg) by mouth every 4 hours as needed for severe pain 60 tablet 0     sertraline (ZOLOFT) 100 MG tablet Take 150 mg by mouth every evening        tamsulosin (FLOMAX) 0.4 MG capsule Take 1 capsule (0.4 mg) by mouth daily 30 capsule 3       ROS:  10 point ROS of systems including Constitutional, Eyes, Respiratory, Cardiovascular, Gastroenterology, Genitourinary, Integumentary, Musculoskeletal, Psychiatric were all negative except for pertinent positives noted in my HPI.    Vitals:  /58   Pulse 60   Temp 97.3  F (36.3  C)   Resp 18   Ht 1.702 m (5' 7\")   Wt 65 kg (143 lb 3.2 oz)   SpO2 96%   BMI " 22.43 kg/m     Exam:  GENERAL APPEARANCE:  Alert, in no distress, frail, deconditioned  RESP:  respiratory effort and palpation of chest normal, auscultation of lungs mikey, no respiratory distress  CV:  Palpation and auscultation of heart done , rate and rhythm regular, no murmur, no RLE peripheral edema, mild LLE edema, PVD changes present  ABDOMEN:  normal bowel sounds, soft, nontender, no hepatosplenomegaly or other masses but assessment limited by clothed, seated assessment   M/S:   Gait and station with W/C for mobility, has LLE ankle brace in place, RUE splint with sling in place, Digits and nails with significant arthritic changes, significantly reduced muscle mass  SKIN:  Inspection and Palpation of skin and subcutaneous tissue with scalp laceration with foam dressing in place, no drainage, no sutures in place, no s/s of infection.  Mild generalized bruising to skin also in place.   PSYCH:  insight and judgement, memory with impairment present , affect and mood normal,  follows commands readily       Lab/Diagnostic data:  reviewed today by me in Care Everywhere    ASSESSMENT/PLAN:  Closed fracture of distal end of right humerus with routine healing, unspecified fracture morphology, subsequent encounter  Personal history of fall  S/p fall, RUE humerus fracture with reduction in ED  Ortho consulted and after RvB, medical management treatment plan in place with splint and sling, f/u soon for likely cast.    + CMS today with +1-2 radial pulse, noted swelling to hand, assisted with repositioning  On pillow for elevation.     Analgesia with Tylenol 250 mg q8 hours scheduled, 650 mg q4 hours PRN, (new) oxycodone 5 mg q4 hours PRN.     Patient reports mild pain at rest, much increased pain with movement. Patient's wife requesting change to analgesia.    PLAN:  - f/u 5/29/19 with Ortho for likely cast and imagining  - (new) Tylenol 1000 mg TID  - (new) oxycodone 5 mg q4 hours PRN  - splint and sling in place until  f/u with Ortho  - elevate hand above heart as able for reduction of swelling  - NWB to RUE     Laceration of scalp, subsequent encounter  Head CT neg in ED for acute pathology  discharge summary reports sutures placed in ED, to be removed 6/1/19.  Visit today without sutures present. Erythema noted under foam dressing.     PLAN:  - monitor for sutures beneath swelling but at this time, none noted.   - monitor for s/s of infection    CKD (chronic kidney disease), stage III (H)  BL Creat around 1.2  Mild ARSALAN on CKD this hospitalization  Last few BMPs:   5/22/19: BUN x, Creat 1.42, GFR 45  5/23/19: BUN 58, Creat 1.34, GFR 48  5/24/19: BUN 52, Creat 1.36, GFR 47    PLAN:  - Will avoid nephrotoxic agents (such as ACEI/ARB/NSAIDs, IV contrast) and mindful prescribing with renal dosing.   - monitor BMP for stability    Chronic atrial fibrillation (H)  S/P ablation of atrial flutter  F/B: Cardiology, ROCIO Manley and Jaye Alexander CNP  S/p CTI (6/2014), right atrial appendage atrial tach ablation (9/2014), ICD placement (5/2012 - [employer] dual lead ICD)     RvB of OA discussed this hosp after fall and scalp laceration and decision to hold warfarin at this time until f/u with Cardiology  Remains on PTA ASA 81 mg daily    On PTA Metoprolol 12.5 mg BID for rate control  HRs 60-73, regular today    PLAN:  - PTA warfarin on hold until Cardiology f/u  - PTA ASA 81 mg daily remains  - PTA metoprolol 12.5 mg BID remains  - monitor for rate control.   - f/u with device check as scheduled.     Coronary artery disease involving native coronary artery of native heart, angina presence unspecified  S/P CABG (coronary artery bypass graft) x 2  Ischemic cardiomyopathy  Hypertension, unspecified type  Hyperlipidemia, unspecified hyperlipidemia type  F/B: Cardiology, ROCIO Manley and Jaye Alexander CNP  s/p CABG x 2, JEREMY x 2, Vtach s/p ICD placement (5, 2012, generator change 12/2018), a fib s/p CTI (6/2014) and ablation (9/2014)    On  PTA ASA 81 mg daily, atorvastatin 20 mg daily, metoprolol 12.5 mg BID    BPs mostly 100-120s/50-60s  HRs 60-73, regular today  Patient denies CP, HA, lightheadedness     PLAN:  - continue PTA ASA, statin, metoprolol  - monitor for titration needs  - may anticipate orthostatic BP monitoring since patient has had falls  - f/u with Cardiology as planned     Hx of fracture of left femur s/p IM nailing (3/14/19)  F/b Dr Avalos, [facility]  Has been working with therapy since fracture but has trouble with ambulation, now s/p fall    Analgesia as above - changing today.   No pain on exam today with palpation     PLAN:  - f/u with Dr Avalos, 6/11/19 as planned (if still in TCU, likely this can wait)  - Physical Therapy, Occupational Therapy for strengthening, mobility, etc.   - analgesia as above     History of Left mid femoral vein nonocclusive thrombus  Dx'd 2009; on coumadin for a period of time  Rediscovered during femur fracture hospitalization; patient didn't know that the DVT has not resolved.   US 3/18 showed possible acute on chronic DVT - Teds added    Now off Coumadin.     PLAN:  - PTA ASA 81 gm daily remains  - monitor for leg pain, swelling, s/s of DVT.     Malignant neoplasm of transverse colon (H)  F/b: Dr Gilliam, Medical Oncology  S/p prior sigmoid colon cancer s/p sigmoid colectomy, then new transverse colon cancer s/p lap--> open transverse colectomy, lysis of adhesions 2/1/19    PLAN:  - f/u with Dr Gilliam 6/10/19 as planned (closer to time of appt, can determine patient's mobility and can send message to Dr Gilliam re timing, if needed)  - Colorectal Surgeon recommending colonoscopies every year because of metachronous second colon cancer in only 4 years.      Benign prostatic hyperplasia with urinary frequency  Urinary urgency  Significant difficulties with urinary frequency and urgency which patient reports has been sent his femur fracture and March, 2019.  Previously was on doxazosin which was DC'd as  thought ineffective.  Was seen by urology 4/10/19 Dr Lowe who DC'd doxazosin and started tamsulosin 0.4 mg daily.  Patient's wife reporting this has helped mildly with only having to get up 3 times a night now versus every 1.5 hours prior to medication.    Patient continues to report symptoms.  5/23/19 WBC 14.07 --> 8.87 on 5/24 5/23/19 UA neg    PLAN:  - will r/u UTI with UA/UC before increasing medication  - f/u with Urology as planned who noted possible need for UDT to eval bladder function and/or level of outlet dysfunction.    - Physical Therapy, Occupational Therapy for bladder program     Weight loss, unintentional  Wt Readings from Last 2 Encounters:   05/27/19 65 kg (143 lb 3.2 oz)   05/15/19 62.4 kg (137 lb 9.6 oz)     Patient has had significant recent history of multiple surgeries and hospitalizations.  Was started on boost supplements by PCP  Discussion today with patient's wife regarding supplements who report patient does not have milk    PLAN:  - Boost Breeze (juice-based) BID between meals  - monitor weights    Anemia, unspecified type  Baseline Hgb noted to be 7-9 recently  Had surgery for colon cancer in Feb, 2019  Had surgery for left femur fracture March, 2019 s/p transfusions   Primary Care Provider noted long history of anemia (prior to surgeries)     1/2017:  Ferritin 46  Folate 29.7  Iron 75  Iron binding 311  Iron Sat Index 24  Most recent Vitamin B12 - 2782    5/22/19 - hgb 9.3  5/23/19 - 8.7  5/24/19 - Hgb 8.0 (no surgery this last hospitalization)  Warfarin DC'd (remains on PTA ASA)    No bleeding noted today    PLAN:  - recheck Hgb for stability   - goal Hgb >7.0  - will also recheck iron studies since drawing labs to assess need for Fe sulfate supplementation.     Depression  PTA mirtazapine 15 mg q HS, sertraline 250 mg q HS.   Significant health concerns with various hospitalizations recently.    PLAN:  - monitor for PHQ9 results at [facility]  - consider in-house psych  consult      Multifactorial gait disorder  Chronic left foot drop, probable L4-S1 radiculopathy and left peroneal neuropathy.  F/u Neurology, Dr Jase Duarte, [facility]  Wears soft brace on left foot/ankle.     PLAN:  - Physical Therapy, Occupational Therapy for strengthening, mobility, etc.     Generalized muscle weakness  Physical deconditioning  Various acute illness and surgeries with hospitalizations on chronic disease conditions.   Extensive deconditioning with weight loss and overall decline    PLAN:  - Physical Therapy, Occupational Therapy for strengthening, mobility, etc.        Orders written by provider at facility  1. discontinue all Tylenol orders  2. Tylenol 1000 mg TID Dx: pain  3. UA/UC Dx: urinary urgency, frequency, BPH  4. Boost Breeze (high protein) BID between meals Dx: weight loss  5. BMP, Hgb, iron studies on 5/30/19. Dx: anemia, CKD,    Total time spent with patient visit at the [facility] nursing facility was >45 min including patient visit, review of past records and coordination of care with pt's wife and nursing. Greater than 50% of total time spent with counseling and coordinating care due to review of records, patient visit with discussion re POC with pt's wife and pt, coordination of care with nursing     Electronically signed by:  MARIA LUISA Villanueva CNP                         Sincerely,        MARIA LUISA Villanueva CNP

## 2019-05-29 ENCOUNTER — PRE VISIT (OUTPATIENT)
Dept: UROLOGY | Facility: CLINIC | Age: 84
End: 2019-05-29

## 2019-05-29 NOTE — PROGRESS NOTES
[facility]  [place] [facility] Record Number:  [id number]  Place of Service where encounter took place:  [facility] ([facility]) [[id number]]  Chief Complaint   Patient presents with     RECHECK       HPI:    Noe Florence  is a 84 year old (1935), who is being seen today for an episodic care visit.  HPI information obtained from: facility chart records, facility staff, patient report and [facility] chart review. Today's concern is:     Closed fracture of distal end of right humerus with routine healing, unspecified fracture morphology, subsequent encounter  Personal history of fall  Laceration of scalp, subsequent encounter  CKD (chronic kidney disease), stage III (H)  Chronic atrial fibrillation (H)  S/P ablation of atrial flutter  Coronary artery disease involving native coronary artery of native heart, angina presence unspecified  S/P CABG (coronary artery bypass graft)  Ischemic cardiomyopathy  Hypertension, unspecified type  Hyperlipidemia, unspecified hyperlipidemia type  Hx of fracture of femur  History of deep venous thrombosis  Malignant neoplasm of transverse colon (H)  Benign prostatic hyperplasia with urinary frequency  Urinary urgency  Weight loss, unintentional  Anemia, unspecified type  Multifactorial gait disorder  Depression, unspecified depression type  Generalized muscle weakness  Physical deconditioning  Urinary frequency     Per Epic note/hx:  Patient is a pleasant 84 year old gentleman with PMH of HTN,HLD, CKD3, CAD s/p CABG x 2, JEREMY x 2, Vtach s/p ICD placement (5, 2012, generator change 12/2018), a fib s/p CTI (6/2014) and ablation (9/2014), sigmoid cancer s/p sigmoid colectomy, 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  His humerus fracture was reduced in the ED, Ortho was consulted and  "ultimately decision made to treat medically with splint and f/u with Ortho for likely cast.  Discussion with family and patient about OA and decision made to stop warfarin until f/u with Cardiology.  Scalp laceration was reportedly sutured with expected removal date 6/1/19 without sutures in place at today's visit.      05/29/19 patient had f/u with Orthopedics where patient was transitioned from splint into cast, continue NWB of RUE, f/u in 2 weeks.     Patient is met today in his TCU room where he reports improved pain in his RUE.  He denies any scalp pain (although notes \"awareness\"); denies CP, HA, lightheadedness, SOB, upset stomach, constipation.  She continues to report urgency and frequency but denies dysuria. Recent UA/UC neg for UTI.        Past Medical and Surgical History reviewed in Epic today.    MEDICATIONS:  Current Outpatient Medications   Medication Sig Dispense Refill     acetaminophen (TYLENOL) 500 MG tablet Take 2 tablets (1,000 mg) by mouth 3 times daily       aspirin 81 MG tablet Take 1 tablet by mouth daily.       atorvastatin (LIPITOR) 40 MG tablet Take 0.5 tablets (20 mg) by mouth daily as directed. 90 tablet 0     calcium carbonate (TUMS) 500 MG chewable tablet Take 1 chew tab by mouth 2 times daily       cholecalciferol 1000 units TABS Take 1,000 Units by mouth daily        metoprolol tartrate (LOPRESSOR) 25 MG tablet Take 12.5 mg by mouth 2 times daily  180 tablet 3     mirtazapine (REMERON) 15 MG tablet Take 15 mg by mouth At Bedtime.       oxyCODONE (ROXICODONE) 5 MG tablet Take 1 tablet (5 mg) by mouth every 4 hours as needed for severe pain 60 tablet 0     sertraline (ZOLOFT) 100 MG tablet Take 150 mg by mouth every evening        tamsulosin (FLOMAX) 0.4 MG capsule Take 2 capsules (0.8 mg) by mouth daily 30 capsule 3     REVIEW OF SYSTEMS:  Limited secondary to cognitive impairment but today pt reports 10 point ROS of systems including Constitutional, Eyes, Respiratory, " "Cardiovascular, Gastroenterology, Genitourinary, Integumentary, Musculoskeletal, Psychiatric were all negative except for pertinent positives noted in my HPI.    Objective:  /69   Pulse 65   Temp 97.8  F (36.6  C)   Resp 19   Ht 1.702 m (5' 7\")   Wt 65 kg (143 lb 3.2 oz)   SpO2 98%   BMI 22.43 kg/m    Exam:  GENERAL APPEARANCE:  Alert, in no distress, deconditioned  RESP:  respiratory effort and palpation of chest normal, auscultation of lungs clear, no respiratory distress  CV:  Palpation and auscultation of heart done , rate and rhythm regular with frequenct ectopy but does return to regular, no murmur, L>R peripheral edema (mild on left, scant on right)  ABDOMEN:  normal bowel sounds, soft, nontender, no hepatosplenomegaly or other masses  M/S:   Gait and station with W/C for mobility, Digits and nails with arthritic changes, reduced muscle mass, RUE with cast now and sling + CMS  SKIN:  Inspection and Palpation of skin and subcutaneous tissue with various bruised areas and scalp laceration/abrasion without sutures but with bruising  PSYCH:  insight and judgement, memory with impairment, affect and mood normal, follows commands readily       Labs:   Labs done while at Skilled Nursing Facility done by [facility]. Pertinent results are: reviewed    ASSESSMENT/PLAN:  Closed fracture of distal end of right humerus with routine healing, unspecified fracture morphology, subsequent encounter  Personal history of fall  S/p fall, RUE humerus fracture with reduction in ED  Ortho consulted and after RvB, medical management treatment plan in place with splint and sling, f/u soon for likely cast.     5/29/19 Ortho f/u with cast placed, continue RUE NWB, sling, RETURN TO CLINIC in 2 weeks.      Analgesia with (changed) Tylenol 1000 mg TID, (new) oxycodone 5 mg q4 hours PRN.      Patient reports improved pain control      PLAN:  - f/u with Ortho in 2 weeks   - (new) Tylenol 1000 mg TID  - (new) oxycodone 5 mg q4 " hours PRN  - NWB to RUE      Laceration of scalp, subsequent encounter  Head CT neg in ED for acute pathology  discharge summary reports sutures placed in ED, to be removed 6/1/19.  Last visit without sutures present. Erythema noted under foam dressing.     Exam today without s/s of infection present.  No sutures noted.      PLAN:  - monitor for sutures beneath swelling but at this time, none noted.   - monitor for s/s of infection     CKD (chronic kidney disease), stage III (H)  BL Creat around 1.2  Mild ARSALAN on CKD this hospitalization  Last few BMPs:   5/22/19: BUN x, Creat 1.42, GFR 45  5/23/19: BUN 58, Creat 1.34, GFR 48  5/24/19: BUN 52, Creat 1.36, GFR 47  5/30/19: unfortunately not drawn as not ordered - can order for tomorrow.      PLAN:  - Will avoid nephrotoxic agents (such as ACEI/ARB/NSAIDs, IV contrast) and mindful prescribing with renal dosing.   - BMP tomorrow for monitoring     Chronic atrial fibrillation (H)  S/P ablation of atrial flutter  F/B: Cardiology, ROCIO Manley and Jaye Alexander CNP  S/p CTI (6/2014), right atrial appendage atrial tach ablation (9/2014), ICD placement (5/2012 - [employer] dual lead ICD)      RvB of OA discussed this hosp after fall and scalp laceration and decision to hold warfarin at this time until f/u with Cardiology  Remains on PTA ASA 81 mg daily     On PTA Metoprolol 12.5 mg BID for rate control  [id number], regular with ectopy today      PLAN:  - PTA warfarin on hold until Cardiology f/u  - PTA ASA 81 mg daily remains  - PTA metoprolol 12.5 mg BID remains  - monitor for rate control.   - f/u with device check as scheduled.      Coronary artery disease involving native coronary artery of native heart, angina presence unspecified  S/P CABG (coronary artery bypass graft) x 2  Ischemic cardiomyopathy  Hypertension, unspecified type  Hyperlipidemia, unspecified hyperlipidemia type  F/B: Cardiology, Dr Baird, [facility] and Jaye Alexander CNP  s/p CABG x 2, JEREMY x 2, Vtach s/p  ICD placement (5, 2012, generator change 12/2018), a fib s/p CTI (6/2014) and ablation (9/2014)     On PTA ASA 81 mg daily, atorvastatin 20 mg daily, metoprolol 12.5 mg BID     BPs 110-130s/60s  HRs 60-80, regular with ectopy today  Patient denies CP, HA, lightheadedness      PLAN:   - continue PTA ASA, statin, metoprolol  - monitor for titration needs  - may anticipate orthostatic BP monitoring since patient has had falls  - f/u with Cardiology as planned      Hx of fracture of left femur s/p IM nailing (3/14/19)  F/b Dr Avalos, [facility]  Has been working with therapy since fracture but has trouble with ambulation, now s/p fall     Analgesia as above      PLAN:  - f/u with Dr Avalos, 6/11/19 as planned (if still in TCU, likely this can wait)  - Physical Therapy, Occupational Therapy for strengthening, mobility, etc.   - analgesia as above      History of Left mid femoral vein nonocclusive thrombus  Dx'd 2009; on coumadin for a period of time  Rediscovered during femur fracture hospitalization; patient didn't know that the DVT has not resolved.   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  No s/s of DVT today       PLAN:  - PTA ASA 81 gm daily remains  - monitor for leg pain, swelling, s/s of DVT.      Malignant neoplasm of transverse colon (H)  F/b: Dr Gilliam, Medical Oncology  S/p prior sigmoid colon cancer s/p sigmoid colectomy, then new transverse colon cancer s/p lap--> open transverse colectomy, lysis of adhesions 2/1/19    Exam today with old scars; healed.      PLAN:  - f/u with Dr Gilliam 6/10/19 as planned (closer to time of appt, can determine patient's mobility and can send message to Dr Gilliam re timing, if needed)  - Colorectal Surgeon recommending colonoscopies every year because of metachronous second colon cancer in only 4 years.        Benign prostatic hyperplasia with urinary frequency  Urinary urgency  Significant difficulties with urinary frequency and urgency  which patient reports has been sent his femur fracture and March, 2019.  Previously was on doxazosin which was DC'd as thought ineffective.  Was seen by urology 4/10/19 Dr Lowe who DC'd doxazosin and started tamsulosin 0.4 mg daily.  Patient's wife reporting this has helped mildly with only having to get up 3 times a night now versus every 1.5 hours prior to medication.     Patient continues to report symptoms.  5/23/19 WBC 14.07 --> 8.87 on 5/24 5/23/19 UA neg  5/27/19 UA neg     PLAN:   - Increase tamsulosin to 0.8 mg daily   - f/u with Urology as planned who noted possible need for UDT to eval bladder function and/or level of outlet dysfunction.    - Physical Therapy, Occupational Therapy for bladder program      Weight loss, unintentional      Wt Readings from Last 2 Encounters:   05/27/19 65 kg (143 lb 3.2 oz)   05/15/19 62.4 kg (137 lb 9.6 oz)      Patient has had significant recent history of multiple surgeries and hospitalizations.  Was started on boost supplements by PCP  Discussion with patient's wife regarding supplements who report patient does not have milk     PLAN:  - Boost Breeze (juice-based) BID between meals  - monitor weights     Anemia, unspecified type  Baseline Hgb noted to be 7-9 recently  Had surgery for colon cancer in Feb, 2019  Had surgery for left femur fracture March, 2019 s/p transfusions   Primary Care Provider noted long history of anemia (prior to surgeries)      1/2017:  Ferritin 46  Folate 29.7  Iron 75  Iron binding 311  Iron Sat Index 24  Most recent Vitamin B12 - 2782     5/22/19 - hgb 9.3  5/23/19 - 8.7  5/24/19 - Hgb 8.0 (no surgery this last hospitalization)  Warfarin DC'd (remains on PTA ASA)     No bleeding noted today    5/30/19 Hgb not drawn - can reorder for tomorrow  5/30/19 Iron studies not drawn - can reorder for tomorrow     PLAN:  - goal Hgb >7.0     Depression  PTA mirtazapine 15 mg q HS, sertraline 250 mg q HS.   Significant health concerns with various  "hospitalizations recently.    Patient reports \"uneven\" sleep but overall feels rested the next day.  He reports a \"decreased but it's OK\" appetite.      PLAN:  - monitor for PHQ9 results at SNF  - consider in-house psych consult  - monitor weights.         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  F/u Neurology, Dr Jase Duarte, [facility]  Wears soft brace on left foot/ankle.      PLAN:  - Physical Therapy, Occupational Therapy for strengthening, mobility, etc.      Generalized muscle weakness  Physical deconditioning  Various acute illness and surgeries with hospitalizations on chronic disease conditions.   Extensive deconditioning with weight loss and overall decline    Therapy Update:  MOd A for STS transfers  Ambulating 20 ft with quad cane, Min A  Vey unsteady  Plan to assess ADLs soon  MoCA 17/30.      PLAN:  - Physical Therapy, Occupational Therapy for strengthening, mobility, etc.     Orders written by provider at facility  1. Increase tamsulosin to 0.8 mg daily Dx: BPH  2. OK to change labs to tomorrow     Total time spent with patient visit at the skilled nursing facility was >30 min including patient visit, review of past records and coordinationof care with nursing. Greater than 50% of total time spent with counseling and coordinating care due to discussion with pt about timed toileting and symptom management along with possible POC until f/u with Urology, above diagnoses with POC with each, coordination of care with nursing.  Electronically signed by:  MARIA LUISA Villanueva CNP           "

## 2019-05-29 NOTE — TELEPHONE ENCOUNTER
Follow up- urinary frequency/symptom recheck.  Records available in [facility].

## 2019-05-30 ENCOUNTER — NURSING HOME VISIT (OUTPATIENT)
Dept: GERIATRICS | Facility: CLINIC | Age: 84
End: 2019-05-30
Payer: COMMERCIAL

## 2019-05-30 VITALS
WEIGHT: 143.2 LBS | SYSTOLIC BLOOD PRESSURE: 153 MMHG | RESPIRATION RATE: 19 BRPM | HEIGHT: 67 IN | DIASTOLIC BLOOD PRESSURE: 69 MMHG | OXYGEN SATURATION: 98 % | BODY MASS INDEX: 22.47 KG/M2 | TEMPERATURE: 97.8 F | HEART RATE: 65 BPM

## 2019-05-30 DIAGNOSIS — F32.A DEPRESSION, UNSPECIFIED DEPRESSION TYPE: ICD-10-CM

## 2019-05-30 DIAGNOSIS — I25.5 ISCHEMIC CARDIOMYOPATHY: ICD-10-CM

## 2019-05-30 DIAGNOSIS — S01.01XD LACERATION OF SCALP, SUBSEQUENT ENCOUNTER: ICD-10-CM

## 2019-05-30 DIAGNOSIS — I48.20 CHRONIC ATRIAL FIBRILLATION (H): ICD-10-CM

## 2019-05-30 DIAGNOSIS — Z95.1 S/P CABG (CORONARY ARTERY BYPASS GRAFT): ICD-10-CM

## 2019-05-30 DIAGNOSIS — R39.15 URINARY URGENCY: ICD-10-CM

## 2019-05-30 DIAGNOSIS — C18.4 MALIGNANT NEOPLASM OF TRANSVERSE COLON (H): ICD-10-CM

## 2019-05-30 DIAGNOSIS — R26.89 MULTIFACTORIAL GAIT DISORDER: ICD-10-CM

## 2019-05-30 DIAGNOSIS — R53.81 PHYSICAL DECONDITIONING: ICD-10-CM

## 2019-05-30 DIAGNOSIS — D64.9 ANEMIA, UNSPECIFIED TYPE: ICD-10-CM

## 2019-05-30 DIAGNOSIS — I10 HYPERTENSION, UNSPECIFIED TYPE: ICD-10-CM

## 2019-05-30 DIAGNOSIS — R35.0 BENIGN PROSTATIC HYPERPLASIA WITH URINARY FREQUENCY: ICD-10-CM

## 2019-05-30 DIAGNOSIS — N40.1 BENIGN PROSTATIC HYPERPLASIA WITH URINARY FREQUENCY: ICD-10-CM

## 2019-05-30 DIAGNOSIS — I25.10 CORONARY ARTERY DISEASE INVOLVING NATIVE CORONARY ARTERY OF NATIVE HEART, ANGINA PRESENCE UNSPECIFIED: ICD-10-CM

## 2019-05-30 DIAGNOSIS — E78.5 HYPERLIPIDEMIA, UNSPECIFIED HYPERLIPIDEMIA TYPE: ICD-10-CM

## 2019-05-30 DIAGNOSIS — N18.30 CKD (CHRONIC KIDNEY DISEASE), STAGE III (H): ICD-10-CM

## 2019-05-30 DIAGNOSIS — M62.81 GENERALIZED MUSCLE WEAKNESS: ICD-10-CM

## 2019-05-30 DIAGNOSIS — Z87.81 HX OF FRACTURE OF FEMUR: ICD-10-CM

## 2019-05-30 DIAGNOSIS — Z86.718 HISTORY OF DEEP VENOUS THROMBOSIS: ICD-10-CM

## 2019-05-30 DIAGNOSIS — R35.0 URINARY FREQUENCY: ICD-10-CM

## 2019-05-30 DIAGNOSIS — Z86.79 S/P ABLATION OF ATRIAL FLUTTER: ICD-10-CM

## 2019-05-30 DIAGNOSIS — Z91.81 PERSONAL HISTORY OF FALL: ICD-10-CM

## 2019-05-30 DIAGNOSIS — S42.401D CLOSED FRACTURE OF DISTAL END OF RIGHT HUMERUS WITH ROUTINE HEALING, UNSPECIFIED FRACTURE MORPHOLOGY, SUBSEQUENT ENCOUNTER: Primary | ICD-10-CM

## 2019-05-30 DIAGNOSIS — Z98.890 S/P ABLATION OF ATRIAL FLUTTER: ICD-10-CM

## 2019-05-30 DIAGNOSIS — R63.4 WEIGHT LOSS, UNINTENTIONAL: ICD-10-CM

## 2019-05-30 PROCEDURE — 99309 SBSQ NF CARE MODERATE MDM 30: CPT | Performed by: NURSE PRACTITIONER

## 2019-05-30 RX ORDER — TAMSULOSIN HYDROCHLORIDE 0.4 MG/1
0.8 CAPSULE ORAL DAILY
Qty: 30 CAPSULE | Refills: 3
Start: 2019-05-30 | End: 2019-08-27

## 2019-05-30 ASSESSMENT — MIFFLIN-ST. JEOR: SCORE: 1298.18

## 2019-05-30 NOTE — LETTER
5/30/2019        RE: Noe Florence  [address] [facility] [address]        [facility]  [place] [facility] Record Number:  [id number]  Place of Service where encounter took place:  [facility] ([facility]) [[id number]]  Chief Complaint   Patient presents with     RECHECK       HPI:    Noe Florence  is a 84 year old (1935), who is being seen today for an episodic care visit.  HPI information obtained from: facility chart records, facility staff, patient report and [facility] chart review. Today's concern is:     Closed fracture of distal end of right humerus with routine healing, unspecified fracture morphology, subsequent encounter  Personal history of fall  Laceration of scalp, subsequent encounter  CKD (chronic kidney disease), stage III (H)  Chronic atrial fibrillation (H)  S/P ablation of atrial flutter  Coronary artery disease involving native coronary artery of native heart, angina presence unspecified  S/P CABG (coronary artery bypass graft)  Ischemic cardiomyopathy  Hypertension, unspecified type  Hyperlipidemia, unspecified hyperlipidemia type  Hx of fracture of femur  History of deep venous thrombosis  Malignant neoplasm of transverse colon (H)  Benign prostatic hyperplasia with urinary frequency  Urinary urgency  Weight loss, unintentional  Anemia, unspecified type  Multifactorial gait disorder  Depression, unspecified depression type  Generalized muscle weakness  Physical deconditioning  Urinary frequency     Per Epic note/hx:  Patient is a pleasant 84 year old gentleman with PMH of HTN,HLD, CKD3, CAD s/p CABG x 2, JEREMY x 2, Vtach s/p ICD placement (5, 2012, generator change 12/2018), a fib s/p CTI (6/2014) and ablation (9/2014), sigmoid cancer s/p sigmoid colectomy, transverse colon cancer s/p transverse colectomy (2/1/19), BPH with urinary urgency and frequency, CKD3, depression who after mechanical fall and was found to have right distal humerus fracture and occipital  "scal laceration.  His humerus fracture was reduced in the ED, Ortho was consulted and ultimately decision made to treat medically with splint and f/u with Ortho for likely cast.  Discussion with family and patient about OA and decision made to stop warfarin until f/u with Cardiology.  Scalp laceration was reportedly sutured with expected removal date 6/1/19 without sutures in place at today's visit.      05/29/19 patient had f/u with Orthopedics where patient was transitioned from splint into cast, continue NWB of RUE, f/u in 2 weeks.     Patient is met today in his TCU room where he reports improved pain in his RUE.  He denies any scalp pain (although notes \"awareness\"); denies CP, HA, lightheadedness, SOB, upset stomach, constipation.  She continues to report urgency and frequency but denies dysuria. Recent UA/UC neg for UTI.        Past Medical and Surgical History reviewed in Epic today.    MEDICATIONS:  Current Outpatient Medications   Medication Sig Dispense Refill     acetaminophen (TYLENOL) 500 MG tablet Take 2 tablets (1,000 mg) by mouth 3 times daily       aspirin 81 MG tablet Take 1 tablet by mouth daily.       atorvastatin (LIPITOR) 40 MG tablet Take 0.5 tablets (20 mg) by mouth daily as directed. 90 tablet 0     calcium carbonate (TUMS) 500 MG chewable tablet Take 1 chew tab by mouth 2 times daily       cholecalciferol 1000 units TABS Take 1,000 Units by mouth daily        metoprolol tartrate (LOPRESSOR) 25 MG tablet Take 12.5 mg by mouth 2 times daily  180 tablet 3     mirtazapine (REMERON) 15 MG tablet Take 15 mg by mouth At Bedtime.       oxyCODONE (ROXICODONE) 5 MG tablet Take 1 tablet (5 mg) by mouth every 4 hours as needed for severe pain 60 tablet 0     sertraline (ZOLOFT) 100 MG tablet Take 150 mg by mouth every evening        tamsulosin (FLOMAX) 0.4 MG capsule Take 2 capsules (0.8 mg) by mouth daily 30 capsule 3     REVIEW OF SYSTEMS:  Limited secondary to cognitive impairment but today pt " "reports 10 point ROS of systems including Constitutional, Eyes, Respiratory, Cardiovascular, Gastroenterology, Genitourinary, Integumentary, Musculoskeletal, Psychiatric were all negative except for pertinent positives noted in my HPI.    Objective:  /69   Pulse 65   Temp 97.8  F (36.6  C)   Resp 19   Ht 1.702 m (5' 7\")   Wt 65 kg (143 lb 3.2 oz)   SpO2 98%   BMI 22.43 kg/m     Exam:  GENERAL APPEARANCE:  Alert, in no distress, deconditioned  RESP:  respiratory effort and palpation of chest normal, auscultation of lungs clear, no respiratory distress  CV:  Palpation and auscultation of heart done , rate and rhythm regular with frequenct ectopy but does return to regular, no murmur, L>R peripheral edema (mild on left, scant on right)  ABDOMEN:  normal bowel sounds, soft, nontender, no hepatosplenomegaly or other masses  M/S:   Gait and station with W/C for mobility, Digits and nails with arthritic changes, reduced muscle mass, RUE with cast now and sling + CMS  SKIN:  Inspection and Palpation of skin and subcutaneous tissue with various bruised areas and scalp laceration/abrasion without sutures but with bruising  PSYCH:  insight and judgement, memory with impairment, affect and mood normal, follows commands readily       Labs:   Labs done while at Skilled Nursing Facility done by [facility]. Pertinent results are: reviewed    ASSESSMENT/PLAN:  Closed fracture of distal end of right humerus with routine healing, unspecified fracture morphology, subsequent encounter  Personal history of fall  S/p fall, RUE humerus fracture with reduction in ED  Ortho consulted and after RvB, medical management treatment plan in place with splint and sling, f/u soon for likely cast.     5/29/19 Ortho f/u with cast placed, continue RUE NWB, sling, RETURN TO CLINIC in 2 weeks.      Analgesia with (changed) Tylenol 1000 mg TID, (new) oxycodone 5 mg q4 hours PRN.      Patient reports improved pain control      PLAN:  - f/u " with Ortho in 2 weeks   - (new) Tylenol 1000 mg TID  - (new) oxycodone 5 mg q4 hours PRN  - NWB to RUE      Laceration of scalp, subsequent encounter  Head CT neg in ED for acute pathology  discharge summary reports sutures placed in ED, to be removed 6/1/19.  Last visit without sutures present. Erythema noted under foam dressing.     Exam today without s/s of infection present.  No sutures noted.      PLAN:  - monitor for sutures beneath swelling but at this time, none noted.   - monitor for s/s of infection     CKD (chronic kidney disease), stage III (H)  BL Creat around 1.2  Mild ARSALAN on CKD this hospitalization  Last few BMPs:   5/22/19: BUN x, Creat 1.42, GFR 45  5/23/19: BUN 58, Creat 1.34, GFR 48  5/24/19: BUN 52, Creat 1.36, GFR 47  5/30/19: unfortunately not drawn as not ordered - can order for tomorrow.      PLAN:  - Will avoid nephrotoxic agents (such as ACEI/ARB/NSAIDs, IV contrast) and mindful prescribing with renal dosing.   - BMP tomorrow for monitoring     Chronic atrial fibrillation (H)  S/P ablation of atrial flutter  F/B: Cardiology, Dr Baird, P and Jaye Alexander CNP  S/p CTI (6/2014), right atrial appendage atrial tach ablation (9/2014), ICD placement (5/2012 - [employer] dual lead ICD)      RvB of OA discussed this hosp after fall and scalp laceration and decision to hold warfarin at this time until f/u with Cardiology  Remains on PTA ASA 81 mg daily     On PTA Metoprolol 12.5 mg BID for rate control  [id number], regular with ectopy today      PLAN:  - PTA warfarin on hold until Cardiology f/u  - PTA ASA 81 mg daily remains  - PTA metoprolol 12.5 mg BID remains  - monitor for rate control.   - f/u with device check as scheduled.      Coronary artery disease involving native coronary artery of native heart, angina presence unspecified  S/P CABG (coronary artery bypass graft) x 2  Ischemic cardiomyopathy  Hypertension, unspecified type  Hyperlipidemia, unspecified hyperlipidemia type  F/B:  Cardiology, Dr Baird, [facility] and Jaye Alexander CNP  s/p CABG x 2, JEREMY x 2, Vtach s/p ICD placement (5, 2012, generator change 12/2018), a fib s/p CTI (6/2014) and ablation (9/2014)     On PTA ASA 81 mg daily, atorvastatin 20 mg daily, metoprolol 12.5 mg BID     BPs 110-130s/60s  HRs 60-80, regular with ectopy today  Patient denies CP, HA, lightheadedness      PLAN:   - continue PTA ASA, statin, metoprolol  - monitor for titration needs  - may anticipate orthostatic BP monitoring since patient has had falls  - f/u with Cardiology as planned      Hx of fracture of left femur s/p IM nailing (3/14/19)  F/b Dr Avalos, [facility]  Has been working with therapy since fracture but has trouble with ambulation, now s/p fall     Analgesia as above      PLAN:  - f/u with Dr Avalos, 6/11/19 as planned (if still in TCU, likely this can wait)  - Physical Therapy, Occupational Therapy for strengthening, mobility, etc.   - analgesia as above      History of Left mid femoral vein nonocclusive thrombus  Dx'd 2009; on coumadin for a period of time  Rediscovered during femur fracture hospitalization; patient didn't know that the DVT has not resolved.   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  No s/s of DVT today       PLAN:  - PTA ASA 81 gm daily remains  - monitor for leg pain, swelling, s/s of DVT.      Malignant neoplasm of transverse colon (H)  F/b: Dr Gilliam, Medical Oncology  S/p prior sigmoid colon cancer s/p sigmoid colectomy, then new transverse colon cancer s/p lap--> open transverse colectomy, lysis of adhesions 2/1/19    Exam today with old scars; healed.      PLAN:  - f/u with Dr Gilliam 6/10/19 as planned (closer to time of appt, can determine patient's mobility and can send message to Dr Gilliam re timing, if needed)  - Colorectal Surgeon recommending colonoscopies every year because of metachronous second colon cancer in only 4 years.        Benign prostatic hyperplasia with urinary  frequency  Urinary urgency  Significant difficulties with urinary frequency and urgency which patient reports has been sent his femur fracture and March, 2019.  Previously was on doxazosin which was DC'd as thought ineffective.  Was seen by urology 4/10/19 Dr Lowe who DC'd doxazosin and started tamsulosin 0.4 mg daily.  Patient's wife reporting this has helped mildly with only having to get up 3 times a night now versus every 1.5 hours prior to medication.     Patient continues to report symptoms.  5/23/19 WBC 14.07 --> 8.87 on 5/24 5/23/19 UA neg  5/27/19 UA neg     PLAN:   - Increase tamsulosin to 0.8 mg daily   - f/u with Urology as planned who noted possible need for UDT to eval bladder function and/or level of outlet dysfunction.    - Physical Therapy, Occupational Therapy for bladder program      Weight loss, unintentional      Wt Readings from Last 2 Encounters:   05/27/19 65 kg (143 lb 3.2 oz)   05/15/19 62.4 kg (137 lb 9.6 oz)      Patient has had significant recent history of multiple surgeries and hospitalizations.  Was started on boost supplements by PCP  Discussion with patient's wife regarding supplements who report patient does not have milk     PLAN:  - Boost Breeze (juice-based) BID between meals  - monitor weights     Anemia, unspecified type  Baseline Hgb noted to be 7-9 recently  Had surgery for colon cancer in Feb, 2019  Had surgery for left femur fracture March, 2019 s/p transfusions   Primary Care Provider noted long history of anemia (prior to surgeries)      1/2017:  Ferritin 46  Folate 29.7  Iron 75  Iron binding 311  Iron Sat Index 24  Most recent Vitamin B12 - 2782     5/22/19 - hgb 9.3  5/23/19 - 8.7  5/24/19 - Hgb 8.0 (no surgery this last hospitalization)  Warfarin DC'd (remains on PTA ASA)     No bleeding noted today    5/30/19 Hgb not drawn - can reorder for tomorrow  5/30/19 Iron studies not drawn - can reorder for tomorrow     PLAN:  - goal Hgb >7.0     Depression  PTA  "mirtazapine 15 mg q HS, sertraline 250 mg q HS.   Significant health concerns with various hospitalizations recently.    Patient reports \"uneven\" sleep but overall feels rested the next day.  He reports a \"decreased but it's OK\" appetite.      PLAN:  - monitor for PHQ9 results at SNF  - consider in-house psych consult  - monitor weights.         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  F/u Neurology, Dr Jase Duarte, [facility]  Wears soft brace on left foot/ankle.      PLAN:  - Physical Therapy, Occupational Therapy for strengthening, mobility, etc.      Generalized muscle weakness  Physical deconditioning  Various acute illness and surgeries with hospitalizations on chronic disease conditions.   Extensive deconditioning with weight loss and overall decline    Therapy Update:  MOd A for STS transfers  Ambulating 20 ft with quad cane, Min A  Vey unsteady  Plan to assess ADLs soon  MoCA 17/30.      PLAN:  - Physical Therapy, Occupational Therapy for strengthening, mobility, etc.     Orders written by provider at facility  1. Increase tamsulosin to 0.8 mg daily Dx: BPH  2. OK to change labs to tomorrow     Total time spent with patient visit at the skilled nursing facility was >30 min including patient visit, review of past records and coordinationof care with nursing. Greater than 50% of total time spent with counseling and coordinating care due to discussion with pt about timed toileting and symptom management along with possible POC until f/u with Urology, above diagnoses with POC with each, coordination of care with nursing.  Electronically signed by:  MARIA LUSIA Villanueva CNP               Sincerely,        MARIA LUISA Villanueva CNP    "

## 2019-05-31 ENCOUNTER — ANTICOAGULATION THERAPY VISIT (OUTPATIENT)
Dept: ANTICOAGULATION | Facility: CLINIC | Age: 84
End: 2019-05-31

## 2019-05-31 DIAGNOSIS — I48.20 CHRONIC ATRIAL FIBRILLATION (H): ICD-10-CM

## 2019-05-31 NOTE — PROGRESS NOTES
"Pt had a fall, and had sutures to scalp and had fractured right humerus casted on 5/29.  \"Coumadin on hold until Cards F/U.\"  "

## 2019-06-03 ENCOUNTER — NURSING HOME VISIT (OUTPATIENT)
Dept: GERIATRICS | Facility: CLINIC | Age: 84
End: 2019-06-03
Payer: COMMERCIAL

## 2019-06-03 VITALS
DIASTOLIC BLOOD PRESSURE: 60 MMHG | HEART RATE: 68 BPM | HEIGHT: 67 IN | SYSTOLIC BLOOD PRESSURE: 112 MMHG | WEIGHT: 143.2 LBS | RESPIRATION RATE: 18 BRPM | TEMPERATURE: 97.1 F | BODY MASS INDEX: 22.47 KG/M2 | OXYGEN SATURATION: 98 %

## 2019-06-03 DIAGNOSIS — D64.9 ANEMIA, UNSPECIFIED TYPE: ICD-10-CM

## 2019-06-03 DIAGNOSIS — R41.0 DELIRIUM: ICD-10-CM

## 2019-06-03 DIAGNOSIS — S42.401D CLOSED FRACTURE OF DISTAL END OF RIGHT HUMERUS WITH ROUTINE HEALING, UNSPECIFIED FRACTURE MORPHOLOGY, SUBSEQUENT ENCOUNTER: Primary | ICD-10-CM

## 2019-06-03 DIAGNOSIS — Z86.79 S/P ABLATION OF ATRIAL FLUTTER: ICD-10-CM

## 2019-06-03 DIAGNOSIS — I25.5 ISCHEMIC CARDIOMYOPATHY: ICD-10-CM

## 2019-06-03 DIAGNOSIS — M81.0 OSTEOPOROSIS, UNSPECIFIED OSTEOPOROSIS TYPE, UNSPECIFIED PATHOLOGICAL FRACTURE PRESENCE: ICD-10-CM

## 2019-06-03 DIAGNOSIS — N18.30 CKD (CHRONIC KIDNEY DISEASE), STAGE III (H): ICD-10-CM

## 2019-06-03 DIAGNOSIS — Z98.890 S/P ABLATION OF ATRIAL FLUTTER: ICD-10-CM

## 2019-06-03 DIAGNOSIS — N40.1 BENIGN PROSTATIC HYPERPLASIA WITH URINARY FREQUENCY: ICD-10-CM

## 2019-06-03 DIAGNOSIS — R35.0 BENIGN PROSTATIC HYPERPLASIA WITH URINARY FREQUENCY: ICD-10-CM

## 2019-06-03 PROCEDURE — 99306 1ST NF CARE HIGH MDM 50: CPT | Performed by: INTERNAL MEDICINE

## 2019-06-03 RX ORDER — FERROUS SULFATE 324(65)MG
324 TABLET, DELAYED RELEASE (ENTERIC COATED) ORAL DAILY
COMMUNITY
End: 2019-07-15

## 2019-06-03 ASSESSMENT — MIFFLIN-ST. JEOR: SCORE: 1298.18

## 2019-06-03 NOTE — PROGRESS NOTES
[facility]  PHYSICIAN NOTE    PRIMARY CARE PROVIDER AND CLINIC:  Roberto Sarmiento MD, [address] [facility] / [facility] [address]    Chief Complaint   Patient presents with     Hospital F/U     [facility] Medical Record Number:  [id number]  Place of Service where encounter took place:  [facility] ([facility]) [[id number]]    Noe Florence is a 84 year old (1935), admitted to the above facility from  [facility]. Hospital stay 5/22/19 through 5/25/19. Admitted to this facility for  rehab, medical management and nursing care.     HPI:    HPI information obtained from: facility chart records, patient report and Care Everywhere Epic chart review.     Brief summary of hospital course: Sha was admitted with a fall at home trying to open screen door when he lost his balance and sustained a right distal humerus fracture and occipital scalp laceration; treated non-operatively. He also has h/o left hip fracture in March 2019 s/p IM nailing, colon cancer s/p segmental transverse colectomy Feb 2019 with prior sigmoid colectomy, BPH, depression and h/o delirium while hospitalized. He has an anemia too with HTN, CKD, ischemic cardiomyopathy/CAD and afib s/p ablation on chronic anticoagulation (which was held for now) with AICD placement. He has been a  and lives at home with his long time significant other, Rica.   His humerus fracture was reduced in the ED, placed in a splint/sling and NWB to RUE until ortho f/u 5/29/19. He did f/u with ortho as planned and was transitioned from splint to cast and will have continued NWB of RUE for the next couple weeks til f/u.  D/t recent falls, he has decided to hold Coumadin until discussion with outpatient cardiology. He also has a left foot drop which is chronic and evaluated previously by neurology with a probable L4-S1 radiculopathy and left peroneal neuropathy wearing soft brace. He also has some chronic bilateral L>R lower extremity edema and wears  compression stockings at home.    Updates on status since skilled nursing admission: Sha is seen in his room accompanied by LAVONNE Strauss. For urinary urgency/frequency, UA was negative for infection and his Flomax was doubled to 0.8 mg last week. Still too soon to see if that has been helpful. He tries to contract muscles when he gets urgency and that helps sometimes. He has seen urology in the past. MoCA was 17/30 this stay. He denies pain in right arm and is actually ambidextrous so that is helpful. He hasn't taken Oxycodone in almost a week though and is on scheduled Tylenol. He is participating in therapies but Rica is nervous b/c they can't fit wheelchair in their small home. She is hoping he can get to using a walker or cane, however, is still NWB to the right arm and has ortho f/u again June 12th. They were so pleased with how he was coming along with left hip fracture recovery until this fall where he lost balance opening up screen door. Scalp abrasion healing well per them and nursing just changed bandage. Denies chest pain or dyspnea. Says bowels are moving and had BM today. Does have iron deficiency as well as anemia of chronic disease and MGUS on problem list. Denies abdominal pain or h/o ulcer. Does have h/o colon cancer s/p resections. He is willing to try iron supplement though discussed potential s/e of constipation. Denies dizziness.    CODE STATUS/ADVANCE DIRECTIVES DISCUSSION:   CPR/Full code   Patient's living condition: lives with LAVONNE Strauss    ALLERGIES: Latex    Past Medical History:   Diagnosis Date     Advanced open-angle glaucoma      Atrial fibrillation (H)      CKD (chronic kidney disease), stage III (H) 2005     Colon cancer (H)     Stage II-B colon cancer     Coronary artery disease     s/p CABG x 2, JEREMY x 2     Diverticulitis      Hyperlipidemia      Hypertension      Ischemic cardiomyopathy      MGUS (monoclonal gammopathy of unknown significance)      Nonsenile cataract     BE      Polymorphic ventricular tachycardia (H)      Polymyalgia rheumatica (H)      PVD (posterior vitreous detachment), both eyes 2005     S/P ablation of atrial flutter 6/20/14    CTI     Stented coronary artery      SVT (supraventricular tachycardia) (H)     PPM/AICD for NSVT     Upper leg DVT (deep venous thromboembolism), chronic (H)     Left     Weight loss, unintentional 2017    15 lb in 4 months      Past Surgical History:   Procedure Laterality Date     C CABG, ARTERY-VEIN, FOUR  2006    LIMA-LAD, SVG-Rt PDA, SVG-OM2, SVG-Diag 1     C CABG, VEIN, SINGLE  1994    1-vessel CABG, SVG->PDRCA      CATARACT IOL, RT/LT Bilateral      COLONOSCOPY  3/13/2014    Procedure: COMBINED COLONOSCOPY, SINGLE BIOPSY/POLYPECTOMY BY BIOPSY;;  Surgeon: Mary Gerber MD;  [facility]: [facility]     COLONOSCOPY N/A 6/22/2015    Procedure: COLONOSCOPY;  Surgeon: Marilin Newman MD;  Location:  GI     COLONOSCOPY N/A 11/7/2018    Procedure: COMBINED COLONOSCOPY, SINGLE OR MULTIPLE BIOPSY/POLYPECTOMY BY BIOPSY;  Surgeon: Roberto Esteban MD;  Location:  GI     EP ICD GENERATOR CHANGE DUAL N/A 12/11/2018    Procedure: EP ICD Generator Change Dual;  Surgeon: Deedee Baird MD;  Location:  HEART CARDIAC CATH LAB     H ABLATION ATRIAL FLUTTER       HEART CATH DRUG ELUTING STENT PLACEMENT  4/19/2012    JEREMY x 2 to LCx     IMPLANT IMPLANTABLE CARDIOVERTER DEFIBRILLATOR  5-    ppm/aicd     KNEE SURGERY      right and left knee surgeries     LAPAROSCOPIC ASSISTED COLECTOMY Right 2/1/2019    Procedure: Laparoscopic Converted to Open Transverse Colectomy with Lysis of Adhesions;  Surgeon: Chanelle Guzmán MD;  Location:  OR     LAPAROSCOPIC ASSISTED COLECTOMY LEFT (DESCENDING)  4/8/2014    Procedure: LAPAROSCOPIC ASSISTED COLECTOMY LEFT (DESCENDING);  Hand assisted Laparoscopic Sigmoid Colectomy , Laparoscopic mobilization of spleenic flexure *Latex Allergy*Anesthesia General with Block;  Surgeon: Arielle  Chanelle ADAMS MD;  [facility]     OPEN REDUCTION INTERNAL FIXATION RODDING INTRAMEDULLAR FEMUR FRACTURE TABLE Left 3/14/2019    Procedure: Open Reduction Internal Fixation Left Femur Intramedullar Nailing;  Surgeon: Srikanth Avalos MD;  Location: [facility] OR     REPAIR VALVE MITRAL  2006     30-mm Medtronic Julian ring      SELECTIVE LASER TRABECULOPLASTY (SLT) OD (RIGHT EYE)  4/10, ,+13    RE     SELECTIVE LASER TRABECULOPLASTY (SLT) OS (LEFT EYE)  2012     SHOULDER SURGERY      right rotator cuff     Family History   Problem Relation Age of Onset     C.A.D. Father      Social History     Tobacco Use     Smoking status: Former Smoker     Types: Cigarettes, Cigars     Last attempt to quit: 1968     Years since quittin.4     Smokeless tobacco: Never Used   Substance Use Topics     Alcohol use: Yes     Alcohol/week: 0.0 oz     Comment: 2-3 drinks twice weekly     Drug use: No        Post-discharge medication reconciliation status: Coumadin on hold until cardiology follow up; staring iron supplement today    Current Outpatient Medications   Medication Sig Dispense Refill     acetaminophen (TYLENOL) 500 MG tablet Take 2 tablets (1,000 mg) by mouth 3 times daily       aspirin 81 MG tablet Take 1 tablet by mouth daily.       atorvastatin (LIPITOR) 40 MG tablet Take 0.5 tablets (20 mg) by mouth daily as directed. 90 tablet 0     calcium carbonate (TUMS) 500 MG chewable tablet Take 1 chew tab by mouth 2 times daily       cholecalciferol 1000 units TABS Take 1,000 Units by mouth daily        metoprolol tartrate (LOPRESSOR) 25 MG tablet Take 12.5 mg by mouth 2 times daily  180 tablet 3     mirtazapine (REMERON) 15 MG tablet Take 15 mg by mouth At Bedtime.       oxyCODONE (ROXICODONE) 5 MG tablet Take 1 tablet (5 mg) by mouth every 4 hours as needed for severe pain 60 tablet 0     sertraline (ZOLOFT) 100 MG tablet Take 150 mg by mouth every evening        tamsulosin (FLOMAX) 0.4 MG capsule Take 2  "capsules (0.8 mg) by mouth daily 30 capsule 3       ROS:  10 point ROS of systems including Constitutional, Eyes, Respiratory, Cardiovascular, Gastroenterology, Genitourinary, Integumentary, Musculoskeletal, Psychiatric were all negative except for pertinent positives noted in my HPI.    Exam:  /60   Pulse 68   Temp 97.1  F (36.2  C)   Resp 18   Ht 1.702 m (5' 7\")   Wt 65 kg (143 lb 3.2 oz)   SpO2 98%   BMI 22.43 kg/m     Vitals reviewed in Matrix: Afebrile, HR 50-90s, -150s/60-70s, SpO2 high 90s on RA   Alert, pleasant, NAD, sitting up in wheelchair  No scleral icterus  Moist oral mucosa  No JVD  Heart regular with occasional ectopic beat  Lungs clear without wcr  Abdomen soft  Bilateral L>R lower leg pretibial edema 2+ pitting with overlying chronic venous stasis changes without skin breakdown (compression stockings at home)   R arm in cast + sling  Mood euthymic  Dressing (newly applied) on scalp    Lab/Diagnostic data:  Labs reviewed in [facility]  UA from 5/27/19 unremarkable  6/3/19 labs: Hgb 7.8 today with mixed iron studies, normal electrolytes with BUN 56 and Creatinine 1.36    ASSESSMENT/PLAN:  Closed fracture of distal end of right humerus with routine healing   Continues to be NWB to right arm and will continue to work with physical therapy for safe discharge planning  F/u with ortho is scheduled in a couple weeks  Pain is well controlled and not using Oxycodone but is on scheduled Acetaminophen    Anemia, unspecified type  Likely ASHLEY + ACD with problem list also noting h/o MGUS  Consider UGI bleed in differential diagnosis with chronic elevation of BUN though denies symptoms of UGI bleed  Starting iron today (once daily) and he is aware to monitor for constipation    CKD (chronic kidney disease), stage III    Labs at baseline for him    Benign prostatic hyperplasia with urinary frequency  Increased Flomax to 0.8 mg here at TCU ; denies dizziness    Delirium while " hospitalized  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  Lives at home with LAVONNE Strauss    Ischemic cardiomyopathy with h/o CAD and AICD placement with h/o ablation of atrial fib/flutter  Seems euvolemic; no chest pain nor dyspnea and has chronic mild lower extremity edema/venous stasis utilizing compression stockings  Coumadin on hold still d/t his recurrent falls and he will discuss with outpatient cardiology team risk vs benefits of anticoagulation  He is on ASA 81 mg daily, Metoprolol and Atorvastatin was reduced from 40 to 20 mg by his PCP d/t more than adequate control last month    Osteoporosis  Is on Ca/D and is now a few months out from left hip fracture  To f/u with PCP on long-term management       Electronically signed by:  Renay Srinivasan DO

## 2019-06-03 NOTE — LETTER
6/3/2019        RE: Noe Florence  [address] [facility] [address]        [facility]  PHYSICIAN NOTE    PRIMARY CARE PROVIDER AND CLINIC:  Roberto Sarmiento MD, [address] [facility] [address]    Chief Complaint   Patient presents with     Hospital F/U     [facility] Medical Record Number:  [id number]  Place of Service where encounter took place:  [facility] ([facility]) [[id number]]    Noe Florence is a 84 year old (1935), admitted to the above facility from  [facility]. Hospital stay 5/22/19 through 5/25/19. Admitted to this facility for  rehab, medical management and nursing care.     HPI:    HPI information obtained from: facility chart records, patient report and Care Everywhere Epic chart review.     Brief summary of hospital course: Sha was admitted with a fall at home trying to open screen door when he lost his balance and sustained a right distal humerus fracture and occipital scalp laceration; treated non-operatively. He also has h/o left hip fracture in March 2019 s/p IM nailing, colon cancer s/p segmental transverse colectomy Feb 2019 with prior sigmoid colectomy, BPH, depression and h/o delirium while hospitalized. He has an anemia too with HTN, CKD, ischemic cardiomyopathy/CAD and afib s/p ablation on chronic anticoagulation (which was held for now) with AICD placement. He has been a  and lives at home with his long time significant other, Rica.   His humerus fracture was reduced in the ED, placed in a splint/sling and NWB to RUE until ortho f/u 5/29/19. He did f/u with ortho as planned and was transitioned from splint to cast and will have continued NWB of RUE for the next couple weeks til f/u.  D/t recent falls, he has decided to hold Coumadin until discussion with outpatient cardiology. He also has a left foot drop which is chronic and evaluated previously by neurology with a probable L4-S1 radiculopathy and left peroneal neuropathy wearing  soft brace. He also has some chronic bilateral L>R lower extremity edema and wears compression stockings at home.    Updates on status since skilled nursing admission: Sha is seen in his room accompanied by LAVONNE Strauss. For urinary urgency/frequency, UA was negative for infection and his Flomax was doubled to 0.8 mg last week. Still too soon to see if that has been helpful. He tries to contract muscles when he gets urgency and that helps sometimes. He has seen urology in the past. MoCA was 17/30 this stay. He denies pain in right arm and is actually ambidextrous so that is helpful. He hasn't taken Oxycodone in almost a week though and is on scheduled Tylenol. He is participating in therapies but Rica is nervous b/c they can't fit wheelchair in their small home. She is hoping he can get to using a walker or cane, however, is still NWB to the right arm and has ortho f/u again June 12th. They were so pleased with how he was coming along with left hip fracture recovery until this fall where he lost balance opening up screen door. Scalp abrasion healing well per them and nursing just changed bandage. Denies chest pain or dyspnea. Says bowels are moving and had BM today. Does have iron deficiency as well as anemia of chronic disease and MGUS on problem list. Denies abdominal pain or h/o ulcer. Does have h/o colon cancer s/p resections. He is willing to try iron supplement though discussed potential s/e of constipation. Denies dizziness.    CODE STATUS/ADVANCE DIRECTIVES DISCUSSION:   CPR/Full code   Patient's living condition: lives with LAVONNE Rica    ALLERGIES: Latex    Past Medical History:   Diagnosis Date     Advanced open-angle glaucoma      Atrial fibrillation (H)      CKD (chronic kidney disease), stage III (H) 2005     Colon cancer (H)     Stage II-B colon cancer     Coronary artery disease     s/p CABG x 2, JEREMY x 2     Diverticulitis      Hyperlipidemia      Hypertension      Ischemic cardiomyopathy      MGUS  (monoclonal gammopathy of unknown significance)      Nonsenile cataract     BE     Polymorphic ventricular tachycardia (H)      Polymyalgia rheumatica (H)      PVD (posterior vitreous detachment), both eyes 2005     S/P ablation of atrial flutter 6/20/14    CTI     Stented coronary artery      SVT (supraventricular tachycardia) (H)     PPM/AICD for NSVT     Upper leg DVT (deep venous thromboembolism), chronic (H)     Left     Weight loss, unintentional 2017    15 lb in 4 months      Past Surgical History:   Procedure Laterality Date     C CABG, ARTERY-VEIN, FOUR  2006    LIMA-LAD, SVG-Rt PDA, SVG-OM2, SVG-Diag 1     C CABG, VEIN, SINGLE  1994    1-vessel CABG, SVG->PDRCA      CATARACT IOL, RT/LT Bilateral      COLONOSCOPY  3/13/2014    Procedure: COMBINED COLONOSCOPY, SINGLE BIOPSY/POLYPECTOMY BY BIOPSY;;  Surgeon: Mary Gerber MD;  Location:  GI     COLONOSCOPY N/A 6/22/2015    Procedure: COLONOSCOPY;  Surgeon: Marilin Newman MD;  Location:  GI     COLONOSCOPY N/A 11/7/2018    Procedure: COMBINED COLONOSCOPY, SINGLE OR MULTIPLE BIOPSY/POLYPECTOMY BY BIOPSY;  Surgeon: Roberto Esteban MD;  Location:  GI     EP ICD GENERATOR CHANGE DUAL N/A 12/11/2018    Procedure: EP ICD Generator Change Dual;  Surgeon: Deedee Baird MD;  Location:  HEART CARDIAC CATH LAB     H ABLATION ATRIAL FLUTTER       HEART CATH DRUG ELUTING STENT PLACEMENT  4/19/2012    JEREMY x 2 to LCx     IMPLANT IMPLANTABLE CARDIOVERTER DEFIBRILLATOR  5-    ppm/aicd     KNEE SURGERY      right and left knee surgeries     LAPAROSCOPIC ASSISTED COLECTOMY Right 2/1/2019    Procedure: Laparoscopic Converted to Open Transverse Colectomy with Lysis of Adhesions;  Surgeon: Chanelle Guzmán MD;  Location:  OR     LAPAROSCOPIC ASSISTED COLECTOMY LEFT (DESCENDING)  4/8/2014    Procedure: LAPAROSCOPIC ASSISTED COLECTOMY LEFT (DESCENDING);  Hand assisted Laparoscopic Sigmoid Colectomy , Laparoscopic mobilization of  spleenic flexure *Latex Allergy*Anesthesia General with Block;  Surgeon: Chanelle Guzmán MD;  [facility]     OPEN REDUCTION INTERNAL FIXATION RODDING INTRAMEDULLAR FEMUR FRACTURE TABLE Left 3/14/2019    Procedure: Open Reduction Internal Fixation Left Femur Intramedullar Nailing;  Surgeon: Srikanth Avalos MD;  Location: [facility] OR     REPAIR VALVE MITRAL  2006     30-mm Medtronic Julian ring      SELECTIVE LASER TRABECULOPLASTY (SLT) OD (RIGHT EYE)  4/10, ,+13    RE     SELECTIVE LASER TRABECULOPLASTY (SLT) OS (LEFT EYE)  2012     SHOULDER SURGERY      right rotator cuff     Family History   Problem Relation Age of Onset     C.A.D. Father      Social History     Tobacco Use     Smoking status: Former Smoker     Types: Cigarettes, Cigars     Last attempt to quit: 1968     Years since quittin.4     Smokeless tobacco: Never Used   Substance Use Topics     Alcohol use: Yes     Alcohol/week: 0.0 oz     Comment: 2-3 drinks twice weekly     Drug use: No        Post-discharge medication reconciliation status: Coumadin on hold until cardiology follow up; staring iron supplement today    Current Outpatient Medications   Medication Sig Dispense Refill     acetaminophen (TYLENOL) 500 MG tablet Take 2 tablets (1,000 mg) by mouth 3 times daily       aspirin 81 MG tablet Take 1 tablet by mouth daily.       atorvastatin (LIPITOR) 40 MG tablet Take 0.5 tablets (20 mg) by mouth daily as directed. 90 tablet 0     calcium carbonate (TUMS) 500 MG chewable tablet Take 1 chew tab by mouth 2 times daily       cholecalciferol 1000 units TABS Take 1,000 Units by mouth daily        metoprolol tartrate (LOPRESSOR) 25 MG tablet Take 12.5 mg by mouth 2 times daily  180 tablet 3     mirtazapine (REMERON) 15 MG tablet Take 15 mg by mouth At Bedtime.       oxyCODONE (ROXICODONE) 5 MG tablet Take 1 tablet (5 mg) by mouth every 4 hours as needed for severe pain 60 tablet 0     sertraline (ZOLOFT) 100 MG tablet  "Take 150 mg by mouth every evening        tamsulosin (FLOMAX) 0.4 MG capsule Take 2 capsules (0.8 mg) by mouth daily 30 capsule 3       ROS:  10 point ROS of systems including Constitutional, Eyes, Respiratory, Cardiovascular, Gastroenterology, Genitourinary, Integumentary, Musculoskeletal, Psychiatric were all negative except for pertinent positives noted in my HPI.    Exam:  /60   Pulse 68   Temp 97.1  F (36.2  C)   Resp 18   Ht 1.702 m (5' 7\")   Wt 65 kg (143 lb 3.2 oz)   SpO2 98%   BMI 22.43 kg/m      Vitals reviewed in Matrix: Afebrile, HR 50-90s, -150s/60-70s, SpO2 high 90s on RA   Alert, pleasant, NAD, sitting up in wheelchair  No scleral icterus  Moist oral mucosa  No JVD  Heart regular with occasional ectopic beat  Lungs clear without wcr  Abdomen soft  Bilateral L>R lower leg pretibial edema 2+ pitting with overlying chronic venous stasis changes without skin breakdown (compression stockings at home)   R arm in cast + sling  Mood euthymic  Dressing (newly applied) on scalp    Lab/Diagnostic data:  Labs [facility] in [facility]  UA from 5/27/19 unremarkable  6/3/19 labs: Hgb 7.8 today with mixed iron studies, normal electrolytes with BUN 56 and Creatinine 1.36    ASSESSMENT/PLAN:  Closed fracture of distal end of right humerus with routine healing   Continues to be NWB to right arm and will continue to work with physical therapy for safe discharge planning  F/u with ortho is scheduled in a couple weeks  Pain is well controlled and not using Oxycodone but is on scheduled Acetaminophen    Anemia, unspecified type  Likely ASHLEY + ACD with problem list also noting h/o MGUS  Consider UGI bleed in differential diagnosis with chronic elevation of BUN though denies symptoms of UGI bleed  Starting iron today (once daily) and he is aware to monitor for constipation    CKD (chronic kidney disease), stage III    Labs at baseline for him    Benign prostatic hyperplasia with urinary frequency  Increased " Flomax to 0.8 mg here at TCU ; denies dizziness    Delirium while hospitalized  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  Lives at home with SO Rica    Ischemic cardiomyopathy with h/o CAD and AICD placement with h/o ablation of atrial fib/flutter  Seems euvolemic; no chest pain nor dyspnea and has chronic mild lower extremity edema/venous stasis utilizing compression stockings  Coumadin on hold still d/t his recurrent falls and he will discuss with outpatient cardiology team risk vs benefits of anticoagulation  He is on ASA 81 mg daily, Metoprolol and Atorvastatin was reduced from 40 to 20 mg by his PCP d/t more than adequate control last month    Osteoporosis  Is on Ca/D and is now a few months out from left hip fracture  To f/u with PCP on long-term management       Electronically signed by:  Renay Srinivasan DO        Sincerely,        Renay Srinivasan DO

## 2019-06-05 ENCOUNTER — MEDICAL CORRESPONDENCE (OUTPATIENT)
Dept: HEALTH INFORMATION MANAGEMENT | Facility: CLINIC | Age: 84
End: 2019-06-05

## 2019-06-06 ENCOUNTER — TELEPHONE (OUTPATIENT)
Dept: CARDIOLOGY | Facility: CLINIC | Age: 84
End: 2019-06-06

## 2019-06-06 NOTE — TELEPHONE ENCOUNTER
Spoke with patient's wife Rica by phone.  Patient had a mechanical fall and suffered a humerus fracture and scalp laceration.  At that time it was determined to hold the patient's warfarin.  Orthopedics at the time of fracture recommended follow up in 14 days for consideration of restarting warfarin.  Rica will schedule appointment with EP NPP for evaluation to restart warfarin therapy.  Scheduling number given.  Rica states understanding and agrees to call with any further questions or concerns.

## 2019-06-06 NOTE — TELEPHONE ENCOUNTER
[facility] Call Center    Phone Message    May a detailed message be left on voicemail: yes    Reason for Call: Other: pt's wife calling to make an appt with Dr Desai, pt was taken off of his cumadin, appt was cancelled and now the next availabel is not until September, please call pt's wife to discuss options     Action Taken: Message routed to:  [facility] ([facility]): Cardiology

## 2019-06-06 NOTE — PROGRESS NOTES
Addendum 6/7/19 Patient was supposed to have an appointment today with cardiology to discuss if warfarin could be restarted. Appointment was canceled.  No reschedule date. [facility]

## 2019-06-08 NOTE — PROGRESS NOTES
[facility]  [facility] Record Number:  [id number]  Place of Service where encounter took place:  [facility] ([facility]) [[id number]]  Chief Complaint   Patient presents with     RECHECK       HPI:    Noe Florence  is a 84 year old (1935), who is being seen today for an episodic care visit.  HPI information obtained from: facility chart records, facility staff, patient report and [facility] chart review. Today's concern is:     Closed fracture of distal end of right humerus with routine healing, unspecified fracture morphology, subsequent encounter  Personal history of fall  Laceration of scalp, subsequent encounter  CKD (chronic kidney disease), stage III (H)  Chronic atrial fibrillation (H)  Coronary artery disease involving native coronary artery of native heart, angina presence unspecified  S/P CABG (coronary artery bypass graft)  Ischemic cardiomyopathy  Hypertension, unspecified type  Hyperlipidemia, unspecified hyperlipidemia type  Hx of fracture of femur  History of deep venous thrombosis  Malignant neoplasm of transverse colon (H)  Benign prostatic hyperplasia with urinary frequency  Urinary urgency  Weight loss, unintentional  Anemia, unspecified type  Depression, unspecified depression type  Multifactorial gait disorder  Generalized muscle weakness  Physical deconditioning     Per Epic note/hx:  Patient is a pleasant 84 year old gentleman with PMH of HTN,HLD, CKD3, CAD s/p CABG x 2, JEREMY x 2, Vtach s/p ICD placement (5, 2012, generator change 12/2018), a fib s/p CTI (6/2014) and ablation (9/2014), sigmoid cancer s/p sigmoid colectomy, 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  His humerus fracture was reduced in the ED, Ortho was consulted and ultimately decision made to treat medically with  splint and f/u with Ortho for likely cast.  Discussion with family and patient about OA and decision made to stop warfarin until f/u with Cardiology.  Scalp laceration was reportedly sutured with expected removal date 6/1/19 without sutures in place at today's visit.      05/29/19 patient had f/u with Orthopedics where patient was transitioned from splint into cast, continue NWB of RUE, f/u in 2 weeks.     Patient is met today in the TCU therapy room where he denies shortness of breath, CP, HA, lightheadedness, upset stomach, heartburn, constipation, pain.  He still endorses urination issues and thus far as not noticed a difference in the increase in tamsulosin.       Past Medical and Surgical History reviewed in Epic today.    MEDICATIONS:  Current Outpatient Medications   Medication Sig Dispense Refill     acetaminophen (TYLENOL) 500 MG tablet Take 2 tablets (1,000 mg) by mouth 3 times daily       aspirin 81 MG tablet Take 1 tablet by mouth daily.       atorvastatin (LIPITOR) 40 MG tablet Take 0.5 tablets (20 mg) by mouth daily as directed. 90 tablet 0     calcium carbonate (TUMS) 500 MG chewable tablet Take 1 chew tab by mouth 2 times daily       cholecalciferol 1000 units TABS Take 1,000 Units by mouth daily        Ferrous Sulfate 324 (65 Fe) MG TBEC Take 324 mg by mouth daily       metoprolol tartrate (LOPRESSOR) 25 MG tablet Take 12.5 mg by mouth 2 times daily  180 tablet 3     mirtazapine (REMERON) 15 MG tablet Take 15 mg by mouth At Bedtime.       oxyCODONE (ROXICODONE) 5 MG tablet Take 1 tablet (5 mg) by mouth every 4 hours as needed for severe pain 60 tablet 0     sertraline (ZOLOFT) 100 MG tablet Take 150 mg by mouth every evening        tamsulosin (FLOMAX) 0.4 MG capsule Take 2 capsules (0.8 mg) by mouth daily 30 capsule 3     REVIEW OF SYSTEMS:  Limited secondary to cognitive impairment but today pt reports 10 point ROS of systems including Constitutional, Eyes, Respiratory, Cardiovascular,  "Gastroenterology, Genitourinary, Integumentary, Musculoskeletal, Psychiatric were all negative except for pertinent positives noted in my HPI.    Objective:  /77   Pulse 65   Temp 97.2  F (36.2  C)   Resp 18   Ht 1.702 m (5' 7\")   Wt 65.6 kg (144 lb 9.6 oz)   SpO2 99%   BMI 22.65 kg/m    Exam:  GENERAL APPEARANCE:  Alert, in no distress, deconditioned, thin  RESP:  respiratory effort and palpation of chest normal, auscultation of lungs clear, no respiratory distress  CV:  Palpation and auscultation of heart done , rate and rhythm regular with ectopy but does return to regular, no murmur, no LE peripheral edema   ABDOMEN:  normal bowel sounds, soft, nontender, no hepatosplenomegaly or other masses  M/S:   Gait and station with W/C for mobility, Digits and nails with arthritic changes, bilat knee old scars, reduced muscle mass, RUE with cast and sling, + CMS  SKIN:  Inspection and Palpation of skin and subcutaneous tissue with various bruised areas and scalp laceration/abrasion without sutures but with bruising, healing.   PSYCH:  insight and judgement, memory with mild impairment, affect and mood normal, follows commands readily       Labs:   [facility] done by [facility] lab. Pertinent results are: reviewed    ASSESSMENT/PLAN:  Closed fracture of distal end of right humerus with routine healing, unspecified fracture morphology, subsequent encounter  Personal history of fall  S/p fall, RUE humerus fracture with reduction in ED  Ortho consulted and after RvB, medical management treatment plan in place with splint and sling, f/u soon for likely cast.     5/29/19 Ortho f/u with cast placed, continue RUE NWB, sling, RETURN TO CLINIC in 2 weeks.      Analgesia with (changed) Tylenol 1000 mg TID, (new) oxycodone 5 mg q4 hours PRN - no usgae since 5/29/19.      Patient reports no pain.      PLAN:  - f/u with Ortho as planned   - change (new) Tylenol to 1000 mg BID   - add (new) " Tylenol 1000 mg daily PRN   - discontinue (new) oxycodone 5 mg q4 hours PRN  - NWB to RUE      Laceration of scalp, subsequent encounter  Head CT neg in ED for acute pathology  discharge summary reports sutures placed in ED, to be removed 6/1/19.  Last visit without sutures present. Erythema noted under foam dressing.     Exam today with wound healing, smaller in diameter      PLAN:  - monitor for s/s of infection     CKD (chronic kidney disease), stage III (H)  BL Creat around 1.2  Mild ARSALAN on CKD this hospitalization  Last few BMPs:   5/22/19: BUN x, Creat 1.42, GFR 45  5/23/19: BUN 58, Creat 1.34, GFR 48  5/24/19: BUN 52, Creat 1.36, GFR 47  6/3/19: BUN 56, Creat 1.36, GFR 47     PLAN:  - Will avoid nephrotoxic agents (such as ACEI/ARB/NSAIDs, IV contrast) and mindful prescribing with renal dosing.        Chronic atrial fibrillation (H)  S/P ablation of atrial flutter  F/B: Cardiology, ROCIO Manley and Jaye Alexander CNP  S/p CTI (6/2014), right atrial appendage atrial tach ablation (9/2014), ICD placement (5/2012 - [employer] dual lead ICD)      RvB of OA discussed this hosp after fall and scalp laceration and decision to hold warfarin at this time until f/u with Cardiology  Remains on PTA ASA 81 mg daily     On PTA Metoprolol 12.5 mg BID for rate control  HRs 60-65, reg with ectopy noted today      PLAN:  - PTA warfarin on hold until Cardiology f/u (can discuss RvB with patient's wife and if wish for restart, can do this)  - PTA ASA 81 mg daily remains  - PTA metoprolol 12.5 mg BID remains  - monitor for rate control.   - f/u with device check as scheduled.      Coronary artery disease involving native coronary artery of native heart, angina presence unspecified  S/P CABG (coronary artery bypass graft) x 2  Ischemic cardiomyopathy  Hypertension, unspecified type  Hyperlipidemia, unspecified hyperlipidemia type  F/B: Cardiology, ROCIO Manley and Jaye Alexander CNP  s/p CABG x 2, JEREMY x 2, Vtach s/p ICD  placement (5, 2012, generator change 12/2018), a fib s/p CTI (6/2014) and ablation (9/2014)     On PTA ASA 81 mg daily, atorvastatin 20 mg daily, metoprolol 12.5 mg BID     BPs 120-130s/60-70s  HRs 60-65, regular with ectopy noted today  Patient denies CP, HA, lightheadedness     PLAN:   - continue PTA ASA, statin, metoprolol  - monitor for titration needs  - orthostatic BP monitoring since patient has had falls  - f/u with Cardiology as planned      Hx of fracture of left femur s/p IM nailing (3/14/19)  F/b Dr Avalos, [facility]  Has been working with therapy since fracture but has trouble with ambulation, now s/p fall     Analgesia as above   Patient denies pain.      PLAN:  - f/u with Dr Avalos, 6/11/19 as planned (patient's wife is not around today so unknown if plan is to keep this appt or not)   - Physical Therapy, Occupational Therapy for strengthening, mobility, etc.   - analgesia as above      History of Left mid femoral vein nonocclusive thrombus  Dx'd 2009; on coumadin for a period of time  Rediscovered during femur fracture hospitalization; patient didn't know that the DVT has not resolved.   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  No s/s of DVT today       PLAN:  - PTA ASA 81 gm daily remains  - monitor for leg pain, swelling, s/s of DVT.      Malignant neoplasm of transverse colon (H)  F/b: Dr Gilliam, Medical Oncology  S/p prior sigmoid colon cancer s/p sigmoid colectomy, then new transverse colon cancer s/p lap--> open transverse colectomy, lysis of adhesions 2/1/19    Exam with old scars; healed.      PLAN:  - f/u with Dr Gilliam 6/10/19 as planned - unknown if patient/pt's wife plans to take him to this today  - Colorectal Surgeon recommending colonoscopies every year because of metachronous second colon cancer in only 4 years.      Benign prostatic hyperplasia with urinary frequency  Urinary urgency  Significant difficulties with urinary frequency and urgency  which patient reports has been sent his femur fracture and March, 2019.  Previously was on doxazosin which was DC'd as thought ineffective.  Was seen by urology 4/10/19 Dr Lowe who DC'd doxazosin and started tamsulosin 0.4 mg daily.  Patient's wife reporting this has helped mildly with only having to get up 3 times a night now versus every 1.5 hours prior to medication.     5/23/19 WBC 14.07 --> 8.87 on 5/24 5/23/19 UA neg  5/27/19 UA neg      5/30/19: Tamsulosin increased to 0.8 mg  Patient reporting he has thus far seen no differencein increase in dosing.     PLAN:   - (Increased) tamsulosin to 0.8 mg daily   - f/u with Urology as planned who noted possible need for UDT to eval bladder function and/or level of outlet dysfunction.    - Physical Therapy, Occupational Therapy for bladder program      Weight loss, unintentional  Body mass index is 22.65 kg/m .  Patient has had significant recent history of multiple surgeries and hospitalizations.  Was started on boost supplements by PCP  Discussion with patient's wife regarding supplements that do not have milk products     PLAN:  - Boost Breeze (juice-based) BID between meals  - monitor weights     Anemia, unspecified type  Baseline Hgb noted to be 7-9 recently  Had surgery for colon cancer in Feb, 2019  Had surgery for left femur fracture March, 2019 s/p transfusions   Primary Care Provider noted long history of anemia (prior to surgeries)      1/2017/ 6/3/19  Ferritin 46   Folate 29.7  Iron 75 / 47  Iron binding 311 / 278  Iron Sat Index 24 / 17  Most recent Vitamin B12 - 2782     5/22/19 - hgb 9.3  5/23/19 - 8.7  5/24/19 - Hgb 8.0 (no surgery this last hospitalization)  Warfarin DC'd (remains on PTA ASA)  6/3/19: Hgb 7.8 - started on Fe Sulfate 325 mg daily     PLAN:  - (new) Fe sulfate 325 mg daily  - goal Hgb >7.0     Depression  PTA mirtazapine 15 mg q HS, sertraline 250 mg q HS.   Significant health concerns with various hospitalizations recently.    Patient  "reports sleep is only \"so/so.\" He endorses a decent appetite.  PHQ9  In SNF - 5 (mild)    Today patient is smiling and pleasant.      PLAN:  - consider in-house psych consult  - monitor weights, mood.         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  F/u Neurology, Dr Jase Duarte, [facility]  Wears soft brace on left foot/ankle.      PLAN:  - Physical Therapy, Occupational Therapy for strengthening, mobility, etc.      Generalized muscle weakness  Physical deconditioning  Various acute illness and surgeries with hospitalizations on chronic disease conditions.   Extensive deconditioning with weight loss and overall decline    Therapy Update:   MOd A for STS transfers  Ambulating 20 ft with quad cane, Min A  Vey unsteady  Plan to assess ADLs soon  MoCA 17/30.      PLAN:  - Physical Therapy, Occupational Therapy for strengthening, mobility, etc.     Orders written by provider at facility  1. Orthostatic BPs x 3  2. Decrease Tylenol to 1000 mg BID   3. Tylenol 1000 mg daily PRN  4. discontinue oxycodone    Electronically signed by:  MARIA LUISA Villanueva CNP         "

## 2019-06-10 ENCOUNTER — NURSING HOME VISIT (OUTPATIENT)
Dept: GERIATRICS | Facility: CLINIC | Age: 84
End: 2019-06-10
Payer: COMMERCIAL

## 2019-06-10 VITALS
TEMPERATURE: 97.2 F | BODY MASS INDEX: 22.7 KG/M2 | SYSTOLIC BLOOD PRESSURE: 167 MMHG | HEART RATE: 65 BPM | HEIGHT: 67 IN | OXYGEN SATURATION: 99 % | RESPIRATION RATE: 18 BRPM | DIASTOLIC BLOOD PRESSURE: 77 MMHG | WEIGHT: 144.6 LBS

## 2019-06-10 DIAGNOSIS — C18.4 MALIGNANT NEOPLASM OF TRANSVERSE COLON (H): ICD-10-CM

## 2019-06-10 DIAGNOSIS — Z86.718 HISTORY OF DEEP VENOUS THROMBOSIS: ICD-10-CM

## 2019-06-10 DIAGNOSIS — E78.5 HYPERLIPIDEMIA, UNSPECIFIED HYPERLIPIDEMIA TYPE: ICD-10-CM

## 2019-06-10 DIAGNOSIS — R26.89 MULTIFACTORIAL GAIT DISORDER: ICD-10-CM

## 2019-06-10 DIAGNOSIS — N40.1 BENIGN PROSTATIC HYPERPLASIA WITH URINARY FREQUENCY: ICD-10-CM

## 2019-06-10 DIAGNOSIS — S42.401D CLOSED FRACTURE OF DISTAL END OF RIGHT HUMERUS WITH ROUTINE HEALING, UNSPECIFIED FRACTURE MORPHOLOGY, SUBSEQUENT ENCOUNTER: Primary | ICD-10-CM

## 2019-06-10 DIAGNOSIS — R53.81 PHYSICAL DECONDITIONING: ICD-10-CM

## 2019-06-10 DIAGNOSIS — S01.01XD LACERATION OF SCALP, SUBSEQUENT ENCOUNTER: ICD-10-CM

## 2019-06-10 DIAGNOSIS — I25.5 ISCHEMIC CARDIOMYOPATHY: ICD-10-CM

## 2019-06-10 DIAGNOSIS — F32.A DEPRESSION, UNSPECIFIED DEPRESSION TYPE: ICD-10-CM

## 2019-06-10 DIAGNOSIS — R63.4 WEIGHT LOSS, UNINTENTIONAL: ICD-10-CM

## 2019-06-10 DIAGNOSIS — R39.15 URINARY URGENCY: ICD-10-CM

## 2019-06-10 DIAGNOSIS — I10 HYPERTENSION, UNSPECIFIED TYPE: ICD-10-CM

## 2019-06-10 DIAGNOSIS — N18.30 CKD (CHRONIC KIDNEY DISEASE), STAGE III (H): ICD-10-CM

## 2019-06-10 DIAGNOSIS — Z87.81 HX OF FRACTURE OF FEMUR: ICD-10-CM

## 2019-06-10 DIAGNOSIS — D64.9 ANEMIA, UNSPECIFIED TYPE: ICD-10-CM

## 2019-06-10 DIAGNOSIS — M62.81 GENERALIZED MUSCLE WEAKNESS: ICD-10-CM

## 2019-06-10 DIAGNOSIS — I25.10 CORONARY ARTERY DISEASE INVOLVING NATIVE CORONARY ARTERY OF NATIVE HEART, ANGINA PRESENCE UNSPECIFIED: ICD-10-CM

## 2019-06-10 DIAGNOSIS — Z91.81 PERSONAL HISTORY OF FALL: ICD-10-CM

## 2019-06-10 DIAGNOSIS — R35.0 BENIGN PROSTATIC HYPERPLASIA WITH URINARY FREQUENCY: ICD-10-CM

## 2019-06-10 DIAGNOSIS — I48.20 CHRONIC ATRIAL FIBRILLATION (H): ICD-10-CM

## 2019-06-10 DIAGNOSIS — Z95.1 S/P CABG (CORONARY ARTERY BYPASS GRAFT): ICD-10-CM

## 2019-06-10 PROCEDURE — 99309 SBSQ NF CARE MODERATE MDM 30: CPT | Performed by: NURSE PRACTITIONER

## 2019-06-10 RX ORDER — ACETAMINOPHEN 500 MG
TABLET ORAL
Start: 2019-06-10 | End: 2019-07-08

## 2019-06-10 ASSESSMENT — MIFFLIN-ST. JEOR: SCORE: 1304.53

## 2019-06-10 NOTE — LETTER
6/10/2019        RE: Noe Florence  [address] [facility] [address]        [facility]  [facility] Record Number:  [id number]  Place of Service where encounter took place:  [facility] ([facility]) [[id number]]  Chief Complaint   Patient presents with     RECHECK       HPI:    Noe Florence  is a 84 year old (1935), who is being seen today for an episodic care visit.  HPI information obtained from: facility chart records, facility staff, patient report and [facility] chart review. Today's concern is:     Closed fracture of distal end of right humerus with routine healing, unspecified fracture morphology, subsequent encounter  Personal history of fall  Laceration of scalp, subsequent encounter  CKD (chronic kidney disease), stage III (H)  Chronic atrial fibrillation (H)  Coronary artery disease involving native coronary artery of native heart, angina presence unspecified  S/P CABG (coronary artery bypass graft)  Ischemic cardiomyopathy  Hypertension, unspecified type  Hyperlipidemia, unspecified hyperlipidemia type  Hx of fracture of femur  History of deep venous thrombosis  Malignant neoplasm of transverse colon (H)  Benign prostatic hyperplasia with urinary frequency  Urinary urgency  Weight loss, unintentional  Anemia, unspecified type  Depression, unspecified depression type  Multifactorial gait disorder  Generalized muscle weakness  Physical deconditioning     Per Epic note/hx:  Patient is a pleasant 84 year old gentleman with PMH of HTN,HLD, CKD3, CAD s/p CABG x 2, JEREMY x 2, Vtach s/p ICD placement (5, 2012, generator change 12/2018), a fib s/p CTI (6/2014) and ablation (9/2014), sigmoid cancer s/p sigmoid colectomy, 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  His humerus fracture was reduced  in the ED, Ortho was consulted and ultimately decision made to treat medically with splint and f/u with Ortho for likely cast.  Discussion with family and patient about OA and decision made to stop warfarin until f/u with Cardiology.  Scalp laceration was reportedly sutured with expected removal date 6/1/19 without sutures in place at today's visit.      05/29/19 patient had f/u with Orthopedics where patient was transitioned from splint into cast, continue NWB of RUE, f/u in 2 weeks.     Patient is met today in the TCU therapy room where he denies shortness of breath, CP, HA, lightheadedness, upset stomach, heartburn, constipation, pain.  He still endorses urination issues and thus far as not noticed a difference in the increase in tamsulosin.       Past Medical and Surgical History reviewed in Epic today.    MEDICATIONS:  Current Outpatient Medications   Medication Sig Dispense Refill     acetaminophen (TYLENOL) 500 MG tablet Take 2 tablets (1,000 mg) by mouth 3 times daily       aspirin 81 MG tablet Take 1 tablet by mouth daily.       atorvastatin (LIPITOR) 40 MG tablet Take 0.5 tablets (20 mg) by mouth daily as directed. 90 tablet 0     calcium carbonate (TUMS) 500 MG chewable tablet Take 1 chew tab by mouth 2 times daily       cholecalciferol 1000 units TABS Take 1,000 Units by mouth daily        Ferrous Sulfate 324 (65 Fe) MG TBEC Take 324 mg by mouth daily       metoprolol tartrate (LOPRESSOR) 25 MG tablet Take 12.5 mg by mouth 2 times daily  180 tablet 3     mirtazapine (REMERON) 15 MG tablet Take 15 mg by mouth At Bedtime.       oxyCODONE (ROXICODONE) 5 MG tablet Take 1 tablet (5 mg) by mouth every 4 hours as needed for severe pain 60 tablet 0     sertraline (ZOLOFT) 100 MG tablet Take 150 mg by mouth every evening        tamsulosin (FLOMAX) 0.4 MG capsule Take 2 capsules (0.8 mg) by mouth daily 30 capsule 3     REVIEW OF SYSTEMS:  Limited secondary to cognitive impairment but today pt reports 10 point ROS  "of systems including Constitutional, Eyes, Respiratory, Cardiovascular, Gastroenterology, Genitourinary, Integumentary, Musculoskeletal, Psychiatric were all negative except for pertinent positives noted in my HPI.    Objective:  /77   Pulse 65   Temp 97.2  F (36.2  C)   Resp 18   Ht 1.702 m (5' 7\")   Wt 65.6 kg (144 lb 9.6 oz)   SpO2 99%   BMI 22.65 kg/m     Exam:  GENERAL APPEARANCE:  Alert, in no distress, deconditioned, thin  RESP:  respiratory effort and palpation of chest normal, auscultation of lungs clear, no respiratory distress  CV:  Palpation and auscultation of heart done , rate and rhythm regular with ectopy but does return to regular, no murmur, no LE peripheral edema   ABDOMEN:  normal bowel sounds, soft, nontender, no hepatosplenomegaly or other masses  M/S:   Gait and station with W/C for mobility, Digits and nails with arthritic changes, bilat knee old scars, reduced muscle mass, RUE with cast and sling, + CMS  SKIN:  Inspection and Palpation of skin and subcutaneous tissue with various bruised areas and scalp laceration/abrasion without sutures but with bruising, healing.   PSYCH:  insight and judgement, memory with mild impairment, affect and mood normal, follows commands readily       Labs:   Labs done [facility] done by [place] lab. Pertinent results are: reviewed    ASSESSMENT/PLAN:  Closed fracture of distal end of right humerus with routine healing, unspecified fracture morphology, subsequent encounter  Personal history of fall  S/p fall, RUE humerus fracture with reduction in ED  Ortho consulted and after RvB, medical management treatment plan in place with splint and sling, f/u soon for likely cast.     5/29/19 Ortho f/u with cast placed, continue RUE NWB, sling, RETURN TO CLINIC in 2 weeks.      Analgesia with (changed) Tylenol 1000 mg TID, (new) oxycodone 5 mg q4 hours PRN - no usgae since 5/29/19.      Patient reports no pain.      PLAN:  - f/u with " Ortho as planned   - change (new) Tylenol to 1000 mg BID   - add (new) Tylenol 1000 mg daily PRN   - discontinue (new) oxycodone 5 mg q4 hours PRN  - NWB to RUE      Laceration of scalp, subsequent encounter  Head CT neg in ED for acute pathology  discharge summary reports sutures placed in ED, to be removed 6/1/19.  Last visit without sutures present. Erythema noted under foam dressing.     Exam today with wound healing, smaller in diameter      PLAN:  - monitor for s/s of infection     CKD (chronic kidney disease), stage III (H)  BL Creat around 1.2  Mild ARSALAN on CKD this hospitalization  Last few BMPs:   5/22/19: BUN x, Creat 1.42, GFR 45  5/23/19: BUN 58, Creat 1.34, GFR 48  5/24/19: BUN 52, Creat 1.36, GFR 47  6/3/19: BUN 56, Creat 1.36, GFR 47     PLAN:  - Will avoid nephrotoxic agents (such as ACEI/ARB/NSAIDs, IV contrast) and mindful prescribing with renal dosing.        Chronic atrial fibrillation (H)  S/P ablation of atrial flutter  F/B: Cardiology, Dr Baird, ROCIO and Jaye Alexander CNP  S/p CTI (6/2014), right atrial appendage atrial tach ablation (9/2014), ICD placement (5/2012 - [employer] dual lead ICD)      RvB of OA discussed this hosp after fall and scalp laceration and decision to hold warfarin at this time until f/u with Cardiology  Remains on PTA ASA 81 mg daily     On PTA Metoprolol 12.5 mg BID for rate control  HRs 60-65, reg with ectopy noted today      PLAN:  - PTA warfarin on hold until Cardiology f/u (can discuss RvB with patient's wife and if wish for restart, can do this)  - PTA ASA 81 mg daily remains  - PTA metoprolol 12.5 mg BID remains  - monitor for rate control.   - f/u with device check as scheduled.      Coronary artery disease involving native coronary artery of native heart, angina presence unspecified  S/P CABG (coronary artery bypass graft) x 2  Ischemic cardiomyopathy  Hypertension, unspecified type  Hyperlipidemia, unspecified hyperlipidemia type  F/B: Cardiology, Dr Baird, RICKYP  and Jaye Alexander CNP  s/p CABG x 2, JEREMY x 2, Vtach s/p ICD placement (5, 2012, generator change 12/2018), a fib s/p CTI (6/2014) and ablation (9/2014)     On PTA ASA 81 mg daily, atorvastatin 20 mg daily, metoprolol 12.5 mg BID     BPs 120-130s/60-70s  HRs 60-65, regular with ectopy noted today  Patient denies CP, HA, lightheadedness     PLAN:   - continue PTA ASA, statin, metoprolol  - monitor for titration needs  - orthostatic BP monitoring since patient has had falls  - f/u with Cardiology as planned      Hx of fracture of left femur s/p IM nailing (3/14/19)  F/b Dr Avalos, [facility]  Has been working with therapy since fracture but has trouble with ambulation, now s/p fall     Analgesia as above   Patient denies pain.      PLAN:  - f/u with Dr Avalos, 6/11/19 as planned (patient's wife is not around today so unknown if plan is to keep this appt or not)   - Physical Therapy, Occupational Therapy for strengthening, mobility, etc.   - analgesia as above      History of Left mid femoral vein nonocclusive thrombus  Dx'd 2009; on coumadin for a period of time  Rediscovered during femur fracture hospitalization; patient didn't know that the DVT has not resolved.   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  No s/s of DVT today       PLAN:  - PTA ASA 81 gm daily remains  - monitor for leg pain, swelling, s/s of DVT.      Malignant neoplasm of transverse colon (H)  F/b: Dr Gilliam, Medical Oncology  S/p prior sigmoid colon cancer s/p sigmoid colectomy, then new transverse colon cancer s/p lap--> open transverse colectomy, lysis of adhesions 2/1/19    Exam with old scars; healed.      PLAN:  - f/u with Dr Gilliam 6/10/19 as planned - unknown if patient/pt's wife plans to take him to this today  - Colorectal Surgeon recommending colonoscopies every year because of metachronous second colon cancer in only 4 years.      Benign prostatic hyperplasia with urinary frequency  Urinary  urgency  Significant difficulties with urinary frequency and urgency which patient reports has been sent his femur fracture and March, 2019.  Previously was on doxazosin which was DC'd as thought ineffective.  Was seen by urology 4/10/19 Dr Lowe who DC'd doxazosin and started tamsulosin 0.4 mg daily.  Patient's wife reporting this has helped mildly with only having to get up 3 times a night now versus every 1.5 hours prior to medication.     5/23/19 WBC 14.07 --> 8.87 on 5/24 5/23/19 UA neg  5/27/19 UA neg      5/30/19: Tamsulosin increased to 0.8 mg  Patient reporting he has thus far seen no differencein increase in dosing.     PLAN:   - (Increased) tamsulosin to 0.8 mg daily   - f/u with Urology as planned who noted possible need for UDT to eval bladder function and/or level of outlet dysfunction.    - Physical Therapy, Occupational Therapy for bladder program      Weight loss, unintentional  Body mass index is 22.65 kg/m .  Patient has had significant recent history of multiple surgeries and hospitalizations.  Was started on boost supplements by PCP  Discussion with patient's wife regarding supplements that do not have milk products     PLAN:  - Boost Breeze (juice-based) BID between meals  - monitor weights     Anemia, unspecified type  Baseline Hgb noted to be 7-9 recently  Had surgery for colon cancer in Feb, 2019  Had surgery for left femur fracture March, 2019 s/p transfusions   Primary Care Provider noted long history of anemia (prior to surgeries)      1/2017/ 6/3/19  Ferritin 46   Folate 29.7  Iron 75 / 47  Iron binding 311 / 278  Iron Sat Index 24 / 17  Most recent Vitamin B12 - 2782     5/22/19 - hgb 9.3  5/23/19 - 8.7  5/24/19 - Hgb 8.0 (no surgery this last hospitalization)  Warfarin DC'd (remains on PTA ASA)  6/3/19: Hgb 7.8 - started on Fe Sulfate 325 mg daily     PLAN:  - (new) Fe sulfate 325 mg daily  - goal Hgb >7.0     Depression  PTA mirtazapine 15 mg q HS, sertraline 250 mg q HS.  "  Significant health concerns with various hospitalizations recently.    Patient reports sleep is only \"so/so.\" He endorses a decent appetite.  PHQ9  In SNF - 5 (mild)    Today patient is smiling and pleasant.      PLAN:  - consider in-house psych consult  - monitor weights, mood.         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  F/u Neurology, Dr Jase Duarte, [facility]  Wears soft brace on left foot/ankle.      PLAN:  - Physical Therapy, Occupational Therapy for strengthening, mobility, etc.      Generalized muscle weakness  Physical deconditioning  Various acute illness and surgeries with hospitalizations on chronic disease conditions.   Extensive deconditioning with weight loss and overall decline    Therapy Update:   MOd A for STS transfers  Ambulating 20 ft with quad cane, Min A  Vey unsteady  Plan to assess ADLs soon  MoCA 17/30.      PLAN:  - Physical Therapy, Occupational Therapy for strengthening, mobility, etc.     Orders written by provider at facility  1. Orthostatic BPs x 3  2. Decrease Tylenol to 1000 mg BID   3. Tylenol 1000 mg daily PRN  4. discontinue oxycodone    Electronically signed by:  MARIA LUISA Villanueva CNP             Sincerely,        MARIA LUISA Villanueva CNP    "

## 2019-06-12 ENCOUNTER — TELEPHONE (OUTPATIENT)
Dept: CARDIOLOGY | Facility: CLINIC | Age: 84
End: 2019-06-12

## 2019-06-12 NOTE — TELEPHONE ENCOUNTER
[facility] Call Center    Phone Message    May a detailed message be left on voicemail: yes    Reason for Call: Other: Rica calling to request a call back. She has some questoins on pts medications. Please call her back to discuss.      Action Taken: Message routed to:  [facility] ([facility] christen cardio

## 2019-06-15 NOTE — PROGRESS NOTES
[facility]  [facility] Record Number:  [id number]  Place of Service where encounter took place:  [facility] ([facility]) [[id number]]  Chief Complaint   Patient presents with     Nursing Home Acute       HPI:    Noe Florence  is a 84 year old (1935), who is being seen today for an episodic care visit.  HPI information obtained from: facility chart records, facility staff, patient report and [facility] chart review. Today's concern is:     Closed fracture of distal end of right humerus with routine healing, unspecified fracture morphology, subsequent encounter  Personal history of fall  Laceration of scalp, subsequent encounter  CKD (chronic kidney disease), stage III (H)  Chronic atrial fibrillation (H)  Coronary artery disease involving native coronary artery of native heart, angina presence unspecified  S/P CABG (coronary artery bypass graft)  Ischemic cardiomyopathy  Hypertension, unspecified type  Hyperlipidemia, unspecified hyperlipidemia type  Hx of fracture of femur  History of deep venous thrombosis  Malignant neoplasm of transverse colon (H)  Benign prostatic hyperplasia with urinary frequency  Urinary urgency  Weight loss, unintentional  Anemia, unspecified type  Depression, unspecified depression type  Multifactorial gait disorder  Generalized muscle weakness  Physical deconditioning     Per Epic note/hx:  Patient is a pleasant 84 year old gentleman with PMH of HTN,HLD, CKD3, CAD s/p CABG x 2, JEREMY x 2, Vtach s/p ICD placement (5, 2012, generator change 12/2018), a fib s/p CTI (6/2014) and ablation (9/2014), sigmoid cancer s/p sigmoid colectomy, 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  His humerus fracture was reduced in the ED, Ortho was consulted and ultimately decision made to treat medically  "with splint and f/u with Ortho for likely cast.  Discussion with family and patient about OA and decision made to stop warfarin until f/u with Cardiology.  Scalp laceration was reportedly sutured with expected removal date 6/1/19 without sutures in place at today's visit.      05/29/19 patient had f/u with Orthopedics where patient was transitioned from splint into cast, continue NWB of RUE, f/u in 2 weeks.     Patient is met today in TCU where he reports no SOB, CP, HA, lightheadedness, heartburn, upset stomach.  He reports his bowel are better managed now with no diarrhea or constipation.  He reports he is \"sore\" but overall would deny pain and has not really noticed the GDR of Tylenol (since last visit).  He reports a f/u on 7/3/19 with [facility] at which time he is hoping for cast removal of RUE.        Past Medical and Surgical History reviewed in Epic today.    MEDICATIONS:  Current Outpatient Medications   Medication Sig Dispense Refill     acetaminophen (TYLENOL) 500 MG tablet Take 2 tablets (1,000 mg) by mouth 2 times daily. May also take 2 tablets (1,000 mg) daily as needed for mild pain.       aspirin 81 MG tablet Take 1 tablet by mouth daily.       atorvastatin (LIPITOR) 40 MG tablet Take 0.5 tablets (20 mg) by mouth daily as directed. 90 tablet 0     calcium carbonate (TUMS) 500 MG chewable tablet Take 1 chew tab by mouth 2 times daily       cholecalciferol 1000 units TABS Take 1,000 Units by mouth daily        Ferrous Sulfate 324 (65 Fe) MG TBEC Take 324 mg by mouth daily       metoprolol tartrate (LOPRESSOR) 25 MG tablet Take 12.5 mg by mouth 2 times daily  180 tablet 3     mirtazapine (REMERON) 15 MG tablet Take 15 mg by mouth At Bedtime.       sertraline (ZOLOFT) 100 MG tablet Take 150 mg by mouth every evening        tamsulosin (FLOMAX) 0.4 MG capsule Take 2 capsules (0.8 mg) by mouth daily 30 capsule 3     REVIEW OF SYSTEMS:  Limited secondary to cognitive impairment but today pt reports 10 point " "ROS of systems including Constitutional, Eyes, Respiratory, Cardiovascular, Gastroenterology, Genitourinary, Integumentary, Musculoskeletal, Psychiatric were all negative except for pertinent positives noted in my HPI.    Objective:  /76   Pulse 62   Temp 97.5  F (36.4  C)   Resp 20   Wt 66.2 kg (146 lb)   SpO2 97%   BMI 22.87 kg/m    Exam:  GENERAL APPEARANCE:  Alert, in no distress, deconditioned, very thin, pleasant  RESP:  respiratory effort and palpation of chest normal, auscultation of lungs clear, no respiratory distress  CV:  Palpation and auscultation of heart done , rate and rhythm regular with ectopy, no murmur, no LE peripheral edema, +2 RUE radial pulse and + CMS  ABDOMEN:  normal bowel sounds, soft, nontender, no hepatosplenomegaly or other masses  M/S:   Gait and station with W/C for mobility, Digits and nails with arthritic changes, bilat knee old scars, reduced muscle mass, RUE with cast and sling, + CMS, + 2 pulse in RUE   SKIN:  Inspection and Palpation of skin and subcutaneous tissue with various bruised areas, fragile skin, scalp laceration/abrasion nearly 100% healed.    PSYCH:  insight and judgement, memory with mild impairment, affect and mood normal, follows commands readily       Labs:   Labs done while at [facility] done by [place] [facility]. Pertinent results are: reviewed    ASSESSMENT/PLAN:  Closed fracture of distal end of right humerus with routine healing, unspecified fracture morphology, subsequent encounter  Personal history of fall  S/p fall, RUE humerus fracture with reduction in ED  Ortho consulted and after RvB, medical management treatment plan in place with splint and sling, f/u soon for likely cast.     5/29/19 Ortho f/u with cast placed, continue RUE NWB, sling, RETURN TO CLINIC in 2 weeks.      Analgesia with (changed) Tylenol 1000 mg BID and daily PRN - x0 usage.     Patient reports \"soreness\" but denies \"pain.\"  He endorses that he did not notice the " difference from TID --> BID dosing.      PLAN:  - f/u with Ortho as planned on 7/3/19 at [facility] [place] [place]  - (new) Tylenol to 1000 mg BID, monitor for ability to GDR  - add (new) Tylenol 1000 mg daily PRN   - NWB to RUE      Laceration of scalp, subsequent encounter  Head CT neg in ED for acute pathology  discharge summary reports sutures placed in ED, to be removed 6/1/19.  Last visit without sutures present. Erythema noted under foam dressing.     Exam today with area nearly 100% healed.      PLAN:  - monitor for s/s of infection     CKD (chronic kidney disease), stage III (H)  BL Creat around 1.2  Mild ARSALAN on CKD this hospitalization  Last few BMPs:   5/22/19: BUN x, Creat 1.42, GFR 45  5/23/19: BUN 58, Creat 1.34, GFR 48  5/24/19: BUN 52, Creat 1.36, GFR 47  6/3/19: BUN 56, Creat 1.36, GFR 47     PLAN:  - Will avoid nephrotoxic agents (such as ACEI/ARB/NSAIDs, IV contrast) and mindful prescribing with renal dosing.        Chronic atrial fibrillation (H)  S/P ablation of atrial flutter  F/B: Cardiology, Dr Baird, [facility] and Jaye Alexander CNP  S/p CTI (6/2014), right atrial appendage atrial tach ablation (9/2014), ICD placement (5/2012 - [employer] dual lead ICD)      RvB of OA discussed this hosp after fall and scalp laceration and decision to hold warfarin at this time until f/u with Cardiology  Remains on PTA ASA 81 mg daily     On PTA Metoprolol 12.5 mg BID for rate control  HRs 62-66 mostly, regular with ectopy but does return to regular today      PLAN:  - PTA warfarin on hold until Cardiology f/u (can discuss RvB with patient's wife and if wish for restart, can do this. Patient's wife not presenttoady)  - PTA ASA 81 mg daily remains  - PTA metoprolol 12.5 mg BID remains  - monitor for rate control.   - f/u with device check as scheduled.      Coronary artery disease involving native coronary artery of native heart, angina presence unspecified  S/P CABG (coronary artery bypass graft) x 2  Ischemic  cardiomyopathy  Hypertension, unspecified type  Hyperlipidemia, unspecified hyperlipidemia type  F/B: Cardiology, Dr Baird, [facility] and Jaye Alexander CNP  s/p CABG x 2, JEREMY x 2, Vtach s/p ICD placement (5, 2012, generator change 12/2018), a fib s/p CTI (6/2014) and ablation (9/2014)     On PTA ASA 81 mg daily, atorvastatin 20 mg daily, metoprolol 12.5 mg BID     BPs 120-150s/70s  HRs 62-66 mostly  Patient denies CP, HA, lightheadedness      PLAN:    - continue PTA ASA, statin, metoprolol  - monitor for titration needs  - orthostatic BP monitoring since patient has had falls  - f/u with Cardiology as planned      Hx of fracture of left femur s/p IM nailing (3/14/19)  F/b Dr Avalos, [facility]  Has been working with therapy since fracture but has trouble with ambulation, now s/p fall     Analgesia as above   Patient denies pain to left femur      PLAN:  - f/u with Dr Avalos on 6/11/19 not done, f/u out-patient after TCU discharge  - Physical Therapy, Occupational Therapy for strengthening, mobility, etc.   - analgesia as above      History of Left mid femoral vein nonocclusive thrombus  Dx'd 2009; on coumadin for a period of time  Rediscovered during femur fracture hospitalization; patient didn't know that the DVT has not resolved.   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  No s/s of DVT today        PLAN:  - PTA ASA 81 gm daily remains  - monitor for leg pain, swelling, s/s of DVT.      Malignant neoplasm of transverse colon (H)  F/b: Dr Gilliam, Medical Oncology  S/p prior sigmoid colon cancer s/p sigmoid colectomy, then new transverse colon cancer s/p lap--> open transverse colectomy, lysis of adhesions 2/1/19    Exam previous visit with old scars; healed.      PLAN:  - f/u with Dr Gilliam on 6/10/19 not completed, recommend f/u post TCU discharge   - Colorectal Surgeon recommending colonoscopies every year because of metachronous second colon cancer in only 4 years.      Benign  prostatic hyperplasia with urinary frequency  Urinary urgency  Significant difficulties with urinary frequency and urgency which patient reports has been sent his femur fracture and March, 2019.  Previously was on doxazosin which was DC'd as thought ineffective.  Was seen by urology 4/10/19 Dr Lowe who DC'd doxazosin and started tamsulosin 0.4 mg daily.  Patient's wife reporting this has helped mildly with only having to get up 3 times a night now versus every 1.5 hours prior to medication.     5/23/19 WBC 14.07 --> 8.87 on 5/24 5/23/19 UA neg  5/27/19 UA neg      5/30/19: Tamsulosin increased to 0.8 mg     PLAN:   - (Increased) tamsulosin to 0.8 mg daily   - f/u with Urology as planned who noted possible need for UDT to eval bladder function and/or level of outlet dysfunction.    - Physical Therapy, Occupational Therapy for bladder program      Weight loss, unintentional  Body mass index is 22.87 kg/m .  Patient has had significant recent history of multiple surgeries and hospitalizations.  Was started on boost supplements by PCP  Discussion with patient's wife regarding supplements that do not have milk products    Weights stable 144-146 lbs     PLAN:  - Boost Breeze (juice-based) BID between meals  - monitor weights      Anemia, unspecified type  Baseline Hgb noted to be 7-9 recently  Had surgery for colon cancer in Feb, 2019  Had surgery for left femur fracture March, 2019 s/p transfusions   Primary Care Provider noted long history of anemia (prior to surgeries)      1/2017/ 6/3/19  Ferritin 46   Folate 29.7  Iron 75 / 47  Iron binding 311 / 278  Iron Sat Index 24 / 17  Most recent Vitamin B12 - 2782     5/22/19 - hgb 9.3  5/23/19 - 8.7  5/24/19 - Hgb 8.0 (no surgery this last hospitalization)  Warfarin DC'd (remains on PTA ASA)  6/3/19: Hgb 7.8 - started on Fe Sulfate 325 mg daily     PLAN:  - (new) Fe sulfate 325 mg daily  - goal Hgb >7.0  - recheck Hgb on Thursday, 6/20/19      Depression  PTA mirtazapine  15 mg q HS, sertraline 250 mg q HS.   Significant health concerns with various hospitalizations recently.    Patient pleasant at today's visit, smiling.   PHQ9  In SNF - 5 (mild)      PLAN:  - consider in-house psych consult  - monitor weights, mood.         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  F/u Neurology, Dr Jase Duarte, [facility]  Wears soft brace on left foot/ankle.      PLAN:  - Physical Therapy, Occupational Therapy for strengthening, mobility, etc.      Generalized muscle weakness  Physical deconditioning  Various acute illness and surgeries with hospitalizations on chronic disease conditions.   Extensive deconditioning with weight loss and overall decline    Therapy Update:   Min A for STS transfers  Ambulating 110 ft with narrow base quad cane  Mod A for toileting  SBA for dressing  CPT 4.6/5.6  MoCA 17/30.      PLAN:  - Physical Therapy, Occupational Therapy for strengthening, mobility, etc.     Orders written by provider at facility  1. Hgb recheck on Thursday, 6/20/19    Total time with patient visit: 25 minutes including discussions about the POC and care coordination with the patient and nursing, therapy. Greater than 50% of total time spent with counseling and coordinating care due to review of SNF EHR, patient visit with discussion about above diagnoses, coordination of care with nursing and therapy.      Electronically signed by:  MARIA LUISA Villanueva CNP

## 2019-06-17 ENCOUNTER — NURSING HOME VISIT (OUTPATIENT)
Dept: GERIATRICS | Facility: CLINIC | Age: 84
End: 2019-06-17
Payer: COMMERCIAL

## 2019-06-17 VITALS
BODY MASS INDEX: 22.87 KG/M2 | RESPIRATION RATE: 20 BRPM | HEART RATE: 62 BPM | WEIGHT: 146 LBS | OXYGEN SATURATION: 97 % | DIASTOLIC BLOOD PRESSURE: 76 MMHG | TEMPERATURE: 97.5 F | SYSTOLIC BLOOD PRESSURE: 150 MMHG

## 2019-06-17 DIAGNOSIS — M62.81 GENERALIZED MUSCLE WEAKNESS: ICD-10-CM

## 2019-06-17 DIAGNOSIS — E78.5 HYPERLIPIDEMIA, UNSPECIFIED HYPERLIPIDEMIA TYPE: ICD-10-CM

## 2019-06-17 DIAGNOSIS — N40.1 BENIGN PROSTATIC HYPERPLASIA WITH URINARY FREQUENCY: ICD-10-CM

## 2019-06-17 DIAGNOSIS — N18.30 CKD (CHRONIC KIDNEY DISEASE), STAGE III (H): ICD-10-CM

## 2019-06-17 DIAGNOSIS — D64.9 ANEMIA, UNSPECIFIED TYPE: ICD-10-CM

## 2019-06-17 DIAGNOSIS — R39.15 URINARY URGENCY: ICD-10-CM

## 2019-06-17 DIAGNOSIS — S01.01XD LACERATION OF SCALP, SUBSEQUENT ENCOUNTER: ICD-10-CM

## 2019-06-17 DIAGNOSIS — F32.A DEPRESSION, UNSPECIFIED DEPRESSION TYPE: ICD-10-CM

## 2019-06-17 DIAGNOSIS — R35.0 BENIGN PROSTATIC HYPERPLASIA WITH URINARY FREQUENCY: ICD-10-CM

## 2019-06-17 DIAGNOSIS — I25.10 CORONARY ARTERY DISEASE INVOLVING NATIVE CORONARY ARTERY OF NATIVE HEART, ANGINA PRESENCE UNSPECIFIED: ICD-10-CM

## 2019-06-17 DIAGNOSIS — Z91.81 PERSONAL HISTORY OF FALL: ICD-10-CM

## 2019-06-17 DIAGNOSIS — S42.401D CLOSED FRACTURE OF DISTAL END OF RIGHT HUMERUS WITH ROUTINE HEALING, UNSPECIFIED FRACTURE MORPHOLOGY, SUBSEQUENT ENCOUNTER: Primary | ICD-10-CM

## 2019-06-17 DIAGNOSIS — I25.5 ISCHEMIC CARDIOMYOPATHY: ICD-10-CM

## 2019-06-17 DIAGNOSIS — R53.81 PHYSICAL DECONDITIONING: ICD-10-CM

## 2019-06-17 DIAGNOSIS — Z86.718 HISTORY OF DEEP VENOUS THROMBOSIS: ICD-10-CM

## 2019-06-17 DIAGNOSIS — C18.4 MALIGNANT NEOPLASM OF TRANSVERSE COLON (H): ICD-10-CM

## 2019-06-17 DIAGNOSIS — R63.4 WEIGHT LOSS, UNINTENTIONAL: ICD-10-CM

## 2019-06-17 DIAGNOSIS — R26.89 MULTIFACTORIAL GAIT DISORDER: ICD-10-CM

## 2019-06-17 DIAGNOSIS — I48.20 CHRONIC ATRIAL FIBRILLATION (H): ICD-10-CM

## 2019-06-17 DIAGNOSIS — Z95.1 S/P CABG (CORONARY ARTERY BYPASS GRAFT): ICD-10-CM

## 2019-06-17 DIAGNOSIS — Z87.81 HX OF FRACTURE OF FEMUR: ICD-10-CM

## 2019-06-17 DIAGNOSIS — I10 HYPERTENSION, UNSPECIFIED TYPE: ICD-10-CM

## 2019-06-17 PROCEDURE — 99309 SBSQ NF CARE MODERATE MDM 30: CPT | Performed by: NURSE PRACTITIONER

## 2019-06-17 NOTE — LETTER
6/17/2019        RE: Noe Florence  [address] [facility] [address]        [place] [facility]  [place] Medical Record Number:  [id number]  Place of [facility] where encounter took place[facility] [[id number]  Chief Complaint   Patient presents with     Nursing Home Acute       HPI:    Noe Florence  is a 84 year old (1935), who is being seen today for an episodic care visit.  HPI information obtained from: facility chart records, facility staff, patient report and [facility] chart review. Today's concern is:     Closed fracture of distal end of right humerus with routine healing, unspecified fracture morphology, subsequent encounter  Personal history of fall  Laceration of scalp, subsequent encounter  CKD (chronic kidney disease), stage III (H)  Chronic atrial fibrillation (H)  Coronary artery disease involving native coronary artery of native heart, angina presence unspecified  S/P CABG (coronary artery bypass graft)  Ischemic cardiomyopathy  Hypertension, unspecified type  Hyperlipidemia, unspecified hyperlipidemia type  Hx of fracture of femur  History of deep venous thrombosis  Malignant neoplasm of transverse colon (H)  Benign prostatic hyperplasia with urinary frequency  Urinary urgency  Weight loss, unintentional  Anemia, unspecified type  Depression, unspecified depression type  Multifactorial gait disorder  Generalized muscle weakness  Physical deconditioning     Per Epic note/hx:  Patient is a pleasant 84 year old gentleman with PMH of HTN,HLD, CKD3, CAD s/p CABG x 2, JEREMY x 2, Vtach s/p ICD placement (5, 2012, generator change 12/2018), a fib s/p CTI (6/2014) and ablation (9/2014), sigmoid cancer s/p sigmoid colectomy, 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  His humerus fracture  "was reduced in the ED, Ortho was consulted and ultimately decision made to treat medically with splint and f/u with Ortho for likely cast.  Discussion with family and patient about OA and decision made to stop warfarin until f/u with Cardiology.  Scalp laceration was reportedly sutured with expected removal date 6/1/19 without sutures in place at today's visit.      05/29/19 patient had f/u with Orthopedics where patient was transitioned from splint into cast, continue NWB of RUE, f/u in 2 weeks.     Patient is met today in TCU where he reports no SOB, CP, HA, lightheadedness, heartburn, upset stomach.  He reports his bowel are better managed now with no diarrhea or constipation.  He reports he is \"sore\" but overall would deny pain and has not really noticed the GDR of Tylenol (since last visit).  He reports a f/u on 7/3/19 with [facility] at which time he is hoping for cast removal of RUE.        Past Medical and Surgical History reviewed in Epic today.    MEDICATIONS:  Current Outpatient Medications   Medication Sig Dispense Refill     acetaminophen (TYLENOL) 500 MG tablet Take 2 tablets (1,000 mg) by mouth 2 times daily. May also take 2 tablets (1,000 mg) daily as needed for mild pain.       aspirin 81 MG tablet Take 1 tablet by mouth daily.       atorvastatin (LIPITOR) 40 MG tablet Take 0.5 tablets (20 mg) by mouth daily as directed. 90 tablet 0     calcium carbonate (TUMS) 500 MG chewable tablet Take 1 chew tab by mouth 2 times daily       cholecalciferol 1000 units TABS Take 1,000 Units by mouth daily        Ferrous Sulfate 324 (65 Fe) MG TBEC Take 324 mg by mouth daily       metoprolol tartrate (LOPRESSOR) 25 MG tablet Take 12.5 mg by mouth 2 times daily  180 tablet 3     mirtazapine (REMERON) 15 MG tablet Take 15 mg by mouth At Bedtime.       sertraline (ZOLOFT) 100 MG tablet Take 150 mg by mouth every evening        tamsulosin (FLOMAX) 0.4 MG capsule Take 2 capsules (0.8 mg) by mouth daily 30 capsule 3 "     REVIEW OF SYSTEMS:  Limited secondary to cognitive impairment but today pt reports 10 point ROS of systems including Constitutional, Eyes, Respiratory, Cardiovascular, Gastroenterology, Genitourinary, Integumentary, Musculoskeletal, Psychiatric were all negative except for pertinent positives noted in my HPI.    Objective:  /76   Pulse 62   Temp 97.5  F (36.4  C)   Resp 20   Wt 66.2 kg (146 lb)   SpO2 97%   BMI 22.87 kg/m     Exam:  GENERAL APPEARANCE:  Alert, in no distress, deconditioned, very thin, pleasant  RESP:  respiratory effort and palpation of chest normal, auscultation of lungs clear, no respiratory distress  CV:  Palpation and auscultation of heart done , rate and rhythm regular with ectopy, no murmur, no LE peripheral edema, +2 RUE radial pulse and + CMS  ABDOMEN:  normal bowel sounds, soft, nontender, no hepatosplenomegaly or other masses  M/S:   Gait and station with W/C for mobility, Digits and nails with arthritic changes, bilat knee old scars, reduced muscle mass, RUE with cast and sling, + CMS, + 2 pulse in RUE   SKIN:  Inspection and Palpation of skin and subcutaneous tissue with various bruised areas, fragile skin, scalp laceration/abrasion nearly 100% healed.    PSYCH:  insight and judgement, memory with mild impairment, affect and mood normal, follows commands readily       Labs:   [facility] done by [place] lab. Pertinent results are: reviewed    ASSESSMENT/PLAN:  Closed fracture of distal end of right humerus with routine healing, unspecified fracture morphology, subsequent encounter  Personal history of fall  S/p fall, RUE humerus fracture with reduction in ED  Ortho consulted and after RvB, medical management treatment plan in place with splint and sling, f/u soon for likely cast.     5/29/19 Ortho f/u with cast placed, continue RUE NWB, sling, RETURN TO CLINIC in 2 weeks.      Analgesia with (changed) Tylenol 1000 mg BID and daily PRN - x0  "usage.     Patient reports \"soreness\" but denies \"pain.\"  He endorses that he did not notice the difference from TID --> BID dosing.      PLAN:  - f/u with Ortho as planned on 7/3/19 at [facility]  - (new) Tylenol to 1000 mg BID, monitor for ability to GDR  - add (new) Tylenol 1000 mg daily PRN   - NWB to RUE      Laceration of scalp, subsequent encounter  Head CT neg in ED for acute pathology  discharge summary reports sutures placed in ED, to be removed 6/1/19.  Last visit without sutures present. Erythema noted under foam dressing.     Exam today with area nearly 100% healed.      PLAN:  - monitor for s/s of infection     CKD (chronic kidney disease), stage III (H)  BL Creat around 1.2  Mild ARSALAN on CKD this hospitalization  Last few BMPs:   5/22/19: BUN x, Creat 1.42, GFR 45  5/23/19: BUN 58, Creat 1.34, GFR 48  5/24/19: BUN 52, Creat 1.36, GFR 47  6/3/19: BUN 56, Creat 1.36, GFR 47     PLAN:  - Will avoid nephrotoxic agents (such as ACEI/ARB/NSAIDs, IV contrast) and mindful prescribing with renal dosing.        Chronic atrial fibrillation (H)  S/P ablation of atrial flutter  F/B: Cardiology, Dr Baird, [facility] and [facility]e [facility]  S/p CTI (6/2014), right atrial appendage atrial tach ablation (9/2014), ICD placement (5/2012 - Medtronic dual lead ICD)      RvB of OA discussed this hosp after fall and scalp laceration and decision to hold warfarin at this time until f/u with Cardiology  Remains on PTA ASA 81 mg daily     On PTA Metoprolol 12.5 mg BID for rate control  HRs 62-66 mostly, regular with ectopy but does return to regular today      PLAN:  - PTA warfarin on hold until Cardiology f/u (can discuss RvB with patient's wife and if wish for restart, can do this. Patient's wife not presenttoady)  - PTA ASA 81 mg daily remains  - PTA metoprolol 12.5 mg BID remains  - monitor for rate control.   - f/u with device check as scheduled.      Coronary artery disease involving native coronary artery of native heart, angina " presence unspecified  S/P CABG (coronary artery bypass graft) x 2  Ischemic cardiomyopathy  Hypertension, unspecified type  Hyperlipidemia, unspecified hyperlipidemia type  F/B: Cardiology, Dr Baird, [facility] and Jaye Alexander CNP  s/p CABG x 2, JEREMY x 2, Vtach s/p ICD placement (5, 2012, generator change 12/2018), a fib s/p CTI (6/2014) and ablation (9/2014)     On PTA ASA 81 mg daily, atorvastatin 20 mg daily, metoprolol 12.5 mg BID     BPs 120-150s/70s  HRs 62-66 mostly  Patient denies CP, HA, lightheadedness      PLAN:    - continue PTA ASA, statin, metoprolol  - monitor for titration needs  - orthostatic BP monitoring since patient has had falls  - f/u with Cardiology as planned      Hx of fracture of left femur s/p IM nailing (3/14/19)  F/b Dr Avalos, [facility]  Has been working with therapy since fracture but has trouble with ambulation, now s/p fall     Analgesia as above   Patient denies pain to left femur      PLAN:  - f/u with Dr Avalos on 6/11/19 not done, f/u out-patient after TCU discharge  - Physical Therapy, Occupational Therapy for strengthening, mobility, etc.   - analgesia as above      History of Left mid femoral vein nonocclusive thrombus  Dx'd 2009; on coumadin for a period of time  Rediscovered during femur fracture hospitalization; patient didn't know that the DVT has not resolved.   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  No s/s of DVT today        PLAN:  - PTA ASA 81 gm daily remains  - monitor for leg pain, swelling, s/s of DVT.      Malignant neoplasm of transverse colon (H)  F/b: Dr Gilliam, Medical Oncology  S/p prior sigmoid colon cancer s/p sigmoid colectomy, then new transverse colon cancer s/p lap--> open transverse colectomy, lysis of adhesions 2/1/19    Exam previous visit with old scars; healed.      PLAN:  - f/u with Dr Gilliam on 6/10/19 not completed, recommend f/u post TCU discharge   - Colorectal Surgeon recommending colonoscopies every year  because of metachronous second colon cancer in only 4 years.      Benign prostatic hyperplasia with urinary frequency  Urinary urgency  Significant difficulties with urinary frequency and urgency which patient reports has been sent his femur fracture and March, 2019.  Previously was on doxazosin which was DC'd as thought ineffective.  Was seen by urology 4/10/19 Dr Lowe who DC'd doxazosin and started tamsulosin 0.4 mg daily.  Patient's wife reporting this has helped mildly with only having to get up 3 times a night now versus every 1.5 hours prior to medication.     5/23/19 WBC 14.07 --> 8.87 on 5/24 5/23/19 UA neg  5/27/19 UA neg      5/30/19: Tamsulosin increased to 0.8 mg     PLAN:   - (Increased) tamsulosin to 0.8 mg daily   - f/u with Urology as planned who noted possible need for UDT to eval bladder function and/or level of outlet dysfunction.    - Physical Therapy, Occupational Therapy for bladder program      Weight loss, unintentional  Body mass index is 22.87 kg/m .  Patient has had significant recent history of multiple surgeries and hospitalizations.  Was started on boost supplements by PCP  Discussion with patient's wife regarding supplements that do not have milk products    Weights stable 144-146 lbs     PLAN:  - Boost Breeze (juice-based) BID between meals  - monitor weights      Anemia, unspecified type  Baseline Hgb noted to be 7-9 recently  Had surgery for colon cancer in Feb, 2019  Had surgery for left femur fracture March, 2019 s/p transfusions   Primary Care Provider noted long history of anemia (prior to surgeries)      1/2017/ 6/3/19  Ferritin 46   Folate 29.7  Iron 75 / 47  Iron binding 311 / 278  Iron Sat Index 24 / 17  Most recent Vitamin B12 - 2782     5/22/19 - hgb 9.3  5/23/19 - 8.7  5/24/19 - Hgb 8.0 (no surgery this last hospitalization)  Warfarin DC'd (remains on PTA ASA)  6/3/19: Hgb 7.8 - started on Fe Sulfate 325 mg daily     PLAN:  - (new) Fe sulfate 325 mg daily  - goal Hgb  >7.0  - recheck Hgb on Thursday, 6/20/19      Depression  PTA mirtazapine 15 mg q HS, sertraline 250 mg q HS.   Significant health concerns with various hospitalizations recently.    Patient pleasant at today's visit, smiling.   PHQ9  In SNF - 5 (mild)      PLAN:  - consider in-house psych consult  - monitor weights, mood.         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  F/u Neurology, Dr Jase Duarte, [facility]  Wears soft brace on left foot/ankle.      PLAN:  - Physical Therapy, Occupational Therapy for strengthening, mobility, etc.      Generalized muscle weakness  Physical deconditioning  Various acute illness and surgeries with hospitalizations on chronic disease conditions.   Extensive deconditioning with weight loss and overall decline    Therapy Update:   Min A for STS transfers  Ambulating 110 ft with narrow base quad cane  Mod A for toileting  SBA for dressing  CPT 4.6/5.6  MoCA 17/30.      PLAN:  - Physical Therapy, Occupational Therapy for strengthening, mobility, etc.     Orders written by provider at facility  1. Hgb recheck on Thursday, 6/20/19    Total time with patient visit: 25 minutes including discussions about the POC and care coordination with the patient and nursing, therapy. Greater than 50% of total time spent with counseling and coordinating care due to review of SNF EHR, patient visit with discussion about above diagnoses, coordination of care with nursing and therapy.      Electronically signed by:  MARIA LUISA Villanueva CNP         [place] [facility]  [place] Medical Record Number:  [id number]  Place of Service where encounter took place:  [facility] [[id number]]  Chief Complaint   Patient presents with     Nursing Home Acute       HPI:    Noe Florence  is a 84 year old (1935), who is being seen today for an episodic care visit.  HPI information obtained from: facility chart records,  facility staff, patient report and [facility] chart review. Today's concern is:     Closed fracture of distal end of right humerus with routine healing, unspecified fracture morphology, subsequent encounter  Personal history of fall  Laceration of scalp, subsequent encounter  CKD (chronic kidney disease), stage III (H)  Chronic atrial fibrillation (H)  Coronary artery disease involving native coronary artery of native heart, angina presence unspecified  S/P CABG (coronary artery bypass graft)  Ischemic cardiomyopathy  Hypertension, unspecified type  Hyperlipidemia, unspecified hyperlipidemia type  Hx of fracture of femur  History of deep venous thrombosis  Malignant neoplasm of transverse colon (H)  Benign prostatic hyperplasia with urinary frequency  Urinary urgency  Weight loss, unintentional  Anemia, unspecified type  Depression, unspecified depression type  Multifactorial gait disorder  Generalized muscle weakness  Physical deconditioning     Per Epic note/hx:  Patient is a pleasant 84 year old gentleman with PMH of HTN,HLD, CKD3, CAD s/p CABG x 2, JEREMY x 2, Vtach s/p ICD placement (5, 2012, generator change 12/2018), a fib s/p CTI (6/2014) and ablation (9/2014), sigmoid cancer s/p sigmoid colectomy, 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  His humerus fracture was reduced in the ED, Ortho was consulted and ultimately decision made to treat medically with splint and f/u with Ortho for likely cast.  Discussion with family and patient about OA and decision made to stop warfarin until f/u with Cardiology.  Scalp laceration was reportedly sutured with expected removal date 6/1/19 without sutures in place at today's visit.      05/29/19 patient had f/u with Orthopedics where patient was transitioned from splint into cast, continue  ROSA of JONNY f/u in 2 weeks.     Patient is met today ***      Past Medical and Surgical History reviewed in Epic today.    MEDICATIONS:  Current Outpatient Medications   Medication Sig Dispense Refill     acetaminophen (TYLENOL) 500 MG tablet Take 2 tablets (1,000 mg) by mouth 2 times daily. May also take 2 tablets (1,000 mg) daily as needed for mild pain.       aspirin 81 MG tablet Take 1 tablet by mouth daily.       atorvastatin (LIPITOR) 40 MG tablet Take 0.5 tablets (20 mg) by mouth daily as directed. 90 tablet 0     calcium carbonate (TUMS) 500 MG chewable tablet Take 1 chew tab by mouth 2 times daily       cholecalciferol 1000 units TABS Take 1,000 Units by mouth daily        Ferrous Sulfate 324 (65 Fe) MG TBEC Take 324 mg by mouth daily       metoprolol tartrate (LOPRESSOR) 25 MG tablet Take 12.5 mg by mouth 2 times daily  180 tablet 3     mirtazapine (REMERON) 15 MG tablet Take 15 mg by mouth At Bedtime.       sertraline (ZOLOFT) 100 MG tablet Take 150 mg by mouth every evening        tamsulosin (FLOMAX) 0.4 MG capsule Take 2 capsules (0.8 mg) by mouth daily 30 capsule 3     REVIEW OF SYSTEMS:  Limited secondary to cognitive impairment but today pt reports 10 point ROS of systems including Constitutional, Eyes, Respiratory, Cardiovascular, Gastroenterology, Genitourinary, Integumentary, Musculoskeletal, Psychiatric were all negative except for pertinent positives noted in my HPI.    Objective:  /76   Pulse 62   Temp 97.5  F (36.4  C)   Resp 20   Wt 66.2 kg (146 lb)   SpO2 97%   BMI 22.87 kg/m     Exam:***  GENERAL APPEARANCE:  Alert, in no distress, deconditioned, thin  RESP:  respiratory effort and palpation of chest normal, auscultation of lungs clear, no respiratory distress  CV:  Palpation and auscultation of heart done , rate and rhythm regular with ectopy but does return to regular, no murmur, no LE peripheral edema   ABDOMEN:  normal bowel sounds, soft, nontender, no hepatosplenomegaly or  other masses  M/S:   Gait and station with W/C for mobility, Digits and nails with arthritic changes, bilat knee old scars, reduced muscle mass, RUE with cast and sling, + CMS  SKIN:  Inspection and Palpation of skin and subcutaneous tissue with various bruised areas and scalp laceration/abrasion without sutures but with bruising, healing.   PSYCH:  insight and judgement, memory with mild impairment, affect and mood normal, follows commands readily       Labs:   Labs done while at [facility] done by [place] lab. Pertinent results are: reviewed    ASSESSMENT/PLAN:  Closed fracture of distal end of right humerus with routine healing, unspecified fracture morphology, subsequent encounter  Personal history of fall  S/p fall, RUE humerus fracture with reduction in ED  Ortho consulted and after RvB, medical management treatment plan in place with splint and sling, f/u soon for likely cast.     5/29/19 Ortho f/u with cast placed, continue RUE NWB, sling, RETURN TO CLINIC in 2 weeks.      Analgesia with (changed) Tylenol 1000 mg BID and daily PRN - ***     Patient reports ***     PLAN:  - f/u with Ortho as planned   - change (new) Tylenol to 1000 mg BID   - add (new) Tylenol 1000 mg daily PRN   - DC'd (new) oxycodone PRN  - NWB to RUE      Laceration of scalp, subsequent encounter  Head CT neg in ED for acute pathology  discharge summary reports sutures placed in ED, to be removed 6/1/19.  Last visit without sutures present. Erythema noted under foam dressing.     Exam today ***     PLAN:  - monitor for s/s of infection     CKD (chronic kidney disease), stage III (H)  BL Creat around 1.2  Mild ARSALAN on CKD this hospitalization  Last few BMPs:   5/22/19: BUN x, Creat 1.42, GFR 45  5/23/19: BUN 58, Creat 1.34, GFR 48  5/24/19: BUN 52, Creat 1.36, GFR 47  6/3/19: BUN 56, Creat 1.36, GFR 47     PLAN:  - Will avoid nephrotoxic agents (such as ACEI/ARB/NSAIDs, IV contrast) and mindful prescribing with renal dosing.         Chronic atrial fibrillation (H)  S/P ablation of atrial flutter  F/B: Cardiology, [facility] Manley and Jaye Alexander CNP  S/p CTI (6/2014), right atrial appendage atrial tach ablation (9/2014), ICD placement (5/2012 - Medtronic dual lead ICD)      RvB of OA discussed this hosp after fall and scalp laceration and decision to hold warfarin at this time until f/u with Cardiology  Remains on PTA ASA 81 mg daily     On PTA Metoprolol 12.5 mg BID for rate control  HRs ***, *** today      PLAN:  - PTA warfarin on hold until Cardiology f/u (can discuss RvB with patient's wife and if wish for restart, can do this) ***  - PTA ASA 81 mg daily remains  - PTA metoprolol 12.5 mg BID remains  - monitor for rate control.   - f/u with device check as scheduled.      Coronary artery disease involving native coronary artery of native heart, angina presence unspecified  S/P CABG (coronary artery bypass graft) x 2  Ischemic cardiomyopathy  Hypertension, unspecified type  Hyperlipidemia, unspecified hyperlipidemia type  F/B: Cardiology, [facility]IO Manley and Jaye Alexander CNP  s/p CABG x 2, JEREMY x 2, Vtach s/p ICD placement (5, 2012, generator change 12/2018), a fib s/p CTI (6/2014) and ablation (9/2014)     On PTA ASA 81 mg daily, atorvastatin 20 mg daily, metoprolol 12.5 mg BID     BPs ***  HRs ***  ***     PLAN: ***   - continue PTA ASA, statin, metoprolol  - monitor for titration needs  - orthostatic BP monitoring since patient has had falls  - f/u with Cardiology as planned      Hx of fracture of left femur s/p IM nailing (3/14/19)  F/b Dr Avalos, [facility]  Has been working with therapy since fracture but has trouble with ambulation, now s/p fall     Analgesia as above   Patient ***     PLAN:  - f/u with Dr Avalos, *** 6/11/19 as planned (patient's wife is not around today so unknown if plan is to keep this appt or not)   - Physical Therapy, Occupational Therapy for strengthening, mobility, etc.   - analgesia as above      History  of Left mid femoral vein nonocclusive thrombus  Dx'd 2009; on coumadin for a period of time  Rediscovered during femur fracture hospitalization; patient didn't know that the DVT has not resolved.   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  No s/s of DVT today ***       PLAN:  - PTA ASA 81 gm daily remains  - monitor for leg pain, swelling, s/s of DVT.      Malignant neoplasm of transverse colon (H)  F/b: Dr Gilliam, Medical Oncology  S/p prior sigmoid colon cancer s/p sigmoid colectomy, then new transverse colon cancer s/p lap--> open transverse colectomy, lysis of adhesions 2/1/19    Exam with old scars; healed. ***     PLAN:  - f/u with Dr Gilliam *** 6/10/19 as planned - unknown if patient/pt's wife plans to take him to this today  - Colorectal Surgeon recommending colonoscopies every year because of metachronous second colon cancer in only 4 years.      Benign prostatic hyperplasia with urinary frequency  Urinary urgency  Significant difficulties with urinary frequency and urgency which patient reports has been sent his femur fracture and March, 2019.  Previously was on doxazosin which was DC'd as thought ineffective.  Was seen by urology 4/10/19 Dr Lowe who DC'd doxazosin and started tamsulosin 0.4 mg daily.  Patient's wife reporting this has helped mildly with only having to get up 3 times a night now versus every 1.5 hours prior to medication.     5/23/19 WBC 14.07 --> 8.87 on 5/24 5/23/19 UA neg  5/27/19 UA neg      5/30/19: Tamsulosin increased to 0.8 mg  Patient reporting ***     PLAN:   - (Increased) tamsulosin to 0.8 mg daily   - f/u with Urology as planned who noted possible need for UDT to eval bladder function and/or level of outlet dysfunction.***    - Physical Therapy, Occupational Therapy for bladder program      Weight loss, unintentional  Body mass index is 22.87 kg/m .  Patient has had significant recent history of multiple surgeries and  hospitalizations.  Was started on boost supplements by PCP  Discussion with patient's wife regarding supplements that do not have milk products     PLAN:  - Boost Breeze (juice-based) BID between meals  - monitor weights ***     Anemia, unspecified type  Baseline Hgb noted to be 7-9 recently  Had surgery for colon cancer in Feb, 2019  Had surgery for left femur fracture March, 2019 s/p transfusions   Primary Care Provider noted long history of anemia (prior to surgeries)      1/2017/ 6/3/19  Ferritin 46   Folate 29.7  Iron 75 / 47  Iron binding 311 / 278  Iron Sat Index 24 / 17  Most recent Vitamin B12 - 2782     5/22/19 - hgb 9.3  5/23/19 - 8.7  5/24/19 - Hgb 8.0 (no surgery this last hospitalization)  Warfarin DC'd (remains on PTA ASA)  6/3/19: Hgb 7.8 - started on Fe Sulfate 325 mg daily     PLAN:  - (new) Fe sulfate 325 mg daily  - goal Hgb >7.0  - recheck Hgb ***     Depression  PTA mirtazapine 15 mg q HS, sertraline 250 mg q HS.   Significant health concerns with various hospitalizations recently.    Patient reports ***  PHQ9  In SNF - 5 (mild)      PLAN:  - consider in-house psych consult  - monitor weights, mood.         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  F/u Neurology, Dr Jase Duarte, [facility]  Wears soft brace on left foot/ankle.      PLAN:  - Physical Therapy, Occupational Therapy for strengthening, mobility, etc.      Generalized muscle weakness  Physical deconditioning  Various acute illness and surgeries with hospitalizations on chronic disease conditions.   Extensive deconditioning with weight loss and overall decline    Therapy Update: ***  MOd A for STS transfers  Ambulating 20 ft with quad cane, Min A  Vey unsteady  Plan to assess ADLs soon  MoCA 17/30.      PLAN:  - Physical Therapy, Occupational Therapy for strengthening, mobility, etc.     Orders written by provider at facility  ***    Total time with patient visit:  {1/2/3/4/5:111255} minutes including discussions about the POC and care coordination with the {1/2/3/4/5:861284}. Greater than 50% of total time spent with counseling and coordinating care due to ***.      Electronically signed by:  Roger Galaviz           Sincerely,        MARIA LUISA Villanueva CNP

## 2019-06-21 ENCOUNTER — TELEPHONE (OUTPATIENT)
Dept: CARDIOLOGY | Facility: CLINIC | Age: 84
End: 2019-06-21

## 2019-06-21 DIAGNOSIS — I48.0 PAROXYSMAL ATRIAL FIBRILLATION (H): Primary | ICD-10-CM

## 2019-06-21 NOTE — TELEPHONE ENCOUNTER
[facility] Call Center    Phone Message    May a detailed message be left on voicemail: yes    Reason for Call: Order(s): Other:   Reason for requested: orders to start up pts coumadin again- pt is currently residing at  [facility]- their ph # is [phone]  Date needed: asap  Provider name: Jaye Alexander      Action Taken: Message routed to:  [facility] ([facility]): cardiology

## 2019-06-24 NOTE — TELEPHONE ENCOUNTER
Called and spoke with TCU staff and arranged appointment with Dr. Baird to discuss restarting warfarin given recent fall with injury on 5/22/19: closed fracture of distal end of right humerus and scalp laceration. Labs on 5/24/19 showed low hgb of 8.0.    Also spoke with Rica, patient's , about above recommendations and appointments.    Labs, device check, Dr. Baird scheduled on 7/1/19. Watchman coordinator also notified, as may be a candidate.

## 2019-06-26 NOTE — TELEPHONE ENCOUNTER
Action    Action Taken 6-26: Established pt with Dr. Baird and cardiology clinic  6-26: Requested EKG  from [facility]  6-26: Received EKG and sent to scanning

## 2019-07-01 ENCOUNTER — ANTICOAGULATION THERAPY VISIT (OUTPATIENT)
Dept: ANTICOAGULATION | Facility: CLINIC | Age: 84
End: 2019-07-01

## 2019-07-01 ENCOUNTER — PRE VISIT (OUTPATIENT)
Dept: CARDIOLOGY | Facility: CLINIC | Age: 84
End: 2019-07-01

## 2019-07-01 ENCOUNTER — OFFICE VISIT (OUTPATIENT)
Dept: CARDIOLOGY | Facility: CLINIC | Age: 84
End: 2019-07-01
Attending: INTERNAL MEDICINE
Payer: COMMERCIAL

## 2019-07-01 VITALS
HEART RATE: 57 BPM | OXYGEN SATURATION: 98 % | DIASTOLIC BLOOD PRESSURE: 55 MMHG | WEIGHT: 149.4 LBS | HEIGHT: 67 IN | BODY MASS INDEX: 23.45 KG/M2 | SYSTOLIC BLOOD PRESSURE: 106 MMHG

## 2019-07-01 DIAGNOSIS — I48.20 CHRONIC ATRIAL FIBRILLATION (H): ICD-10-CM

## 2019-07-01 DIAGNOSIS — Z95.810 ICD (IMPLANTABLE CARDIOVERTER-DEFIBRILLATOR) IN PLACE: ICD-10-CM

## 2019-07-01 DIAGNOSIS — I48.91 ATRIAL FIBRILLATION (H): ICD-10-CM

## 2019-07-01 DIAGNOSIS — I48.0 PAROXYSMAL ATRIAL FIBRILLATION (H): Primary | ICD-10-CM

## 2019-07-01 DIAGNOSIS — I48.0 PAROXYSMAL ATRIAL FIBRILLATION (H): ICD-10-CM

## 2019-07-01 DIAGNOSIS — Z53.09 CONTRAINDICATION TO ANTICOAGULATION THERAPY: ICD-10-CM

## 2019-07-01 DIAGNOSIS — Z79.01 LONG TERM CURRENT USE OF ANTICOAGULANT THERAPY: ICD-10-CM

## 2019-07-01 LAB
ERYTHROCYTE [DISTWIDTH] IN BLOOD BY AUTOMATED COUNT: 15.7 % (ref 10–15)
HCT VFR BLD AUTO: 31.1 % (ref 40–53)
HGB BLD-MCNC: 9.5 G/DL (ref 13.3–17.7)
INR PPP: 1.2 (ref 0.86–1.14)
MCH RBC QN AUTO: 30.3 PG (ref 26.5–33)
MCHC RBC AUTO-ENTMCNC: 30.5 G/DL (ref 31.5–36.5)
MCV RBC AUTO: 99 FL (ref 78–100)
PLATELET # BLD AUTO: 192 10E9/L (ref 150–450)
RBC # BLD AUTO: 3.14 10E12/L (ref 4.4–5.9)
WBC # BLD AUTO: 7.2 10E9/L (ref 4–11)

## 2019-07-01 PROCEDURE — 99215 OFFICE O/P EST HI 40 MIN: CPT | Mod: ZP | Performed by: INTERNAL MEDICINE

## 2019-07-01 PROCEDURE — 36415 COLL VENOUS BLD VENIPUNCTURE: CPT | Performed by: INTERNAL MEDICINE

## 2019-07-01 PROCEDURE — G0463 HOSPITAL OUTPT CLINIC VISIT: HCPCS | Mod: ZF

## 2019-07-01 PROCEDURE — 85610 PROTHROMBIN TIME: CPT | Performed by: INTERNAL MEDICINE

## 2019-07-01 PROCEDURE — 85027 COMPLETE CBC AUTOMATED: CPT | Performed by: INTERNAL MEDICINE

## 2019-07-01 ASSESSMENT — PAIN SCALES - GENERAL: PAINLEVEL: NO PAIN (0)

## 2019-07-01 ASSESSMENT — MIFFLIN-ST. JEOR: SCORE: 1326.3

## 2019-07-01 NOTE — PROGRESS NOTES
7/1/19  See note from Dr. Baird today.  Patient will continue to HOLD Warfarin. Sent Anticoagulation pool message to follow up in three months.  Did not inactivate or resolve episode.    Ciro Sharp RN

## 2019-07-01 NOTE — NURSING NOTE
Chief Complaint   Patient presents with     New Patient     Follow-up restarting warfarin after mechanical fall with injury on 5/22/19: right distal humerus fracture and occipital scalp laceration. Hgb 8.0 on 5/24/19. ?Possible watchman

## 2019-07-01 NOTE — PATIENT INSTRUCTIONS
You were seen in the Electrophysiology today by: Dr. Deedee Baird    Plan:     Follow up visit: 3 months with device check prior        Your Care Team:  EP Cardiology   Telephone Number     Lashawn Cool RN [phone]     For scheduling appts or procedures:    Ifeoma Osuna   [phone]   For the Device Clinic (Pacemakers, ICDs, Loop Recorders)    During business hours: [phone]  After business hours:   [phone]- select option 4 and ask for job code [id number].       Cardiovascular Clinic:   [address] [place] [address]. [place] [address]      As always, Thank you for trusting us with your health care needs!

## 2019-07-01 NOTE — PROGRESS NOTES
HPI: Purpose of visit: Patient comes back for follow-up of paroxysmal atrial fibrillation and after having suffered multiple falls.    The patient has a past medical history significant for  HTN, HLD, CKD3, CAD s/p CABG x2, DESx2, polymorphic VT s/p ICD placement (5/2012, gen change 12/2018), and AF/AFL s/p CTI (6/2014) and right atrial appendage atrial tachycardia ablation (9/2014). His ICD reached REBECCA and he underwent an ICD gen change on 12/11/2018.    Importantly, patient underwent colorectal surgery for colon cancer in February 2019.  In March 2019, patient fell and suffered a left hip fracture and underwent surgery.  In May 2019 patient fell again and suffered a right elbow fracture.  He did not undergo surgery for the elbow fracture.  Patient is currently at the rehab facility and the hope is that in a few weeks he would be able to bear weight and undergo rehabilitation.  He is currently using a walker and a walking cane to ambulate.  His warfarin has been stopped for the last few months.    Patient denies any palpitations, exertional dyspnea, exertional angina, frequent lightheadedness, presyncope or syncope.        PAST MEDICAL HISTORY:  Past Medical History:   Diagnosis Date     Advanced open-angle glaucoma      Atrial fibrillation (H)      CKD (chronic kidney disease), stage III (H) 2005     Colon cancer (H)     Stage II-B colon cancer     Coronary artery disease     s/p CABG x 2, JEREMY x 2     Diverticulitis      Hyperlipidemia      Hypertension      Ischemic cardiomyopathy      MGUS (monoclonal gammopathy of unknown significance)      Nonsenile cataract     BE     Osteoporosis     left hip fracture     Polymorphic ventricular tachycardia (H)      Polymyalgia rheumatica (H)      PVD (posterior vitreous detachment), both eyes 2005     S/P ablation of atrial flutter 6/20/14    CTI     Stented coronary artery      SVT (supraventricular tachycardia) (H)     PPM/AICD for NSVT     Upper leg DVT (deep venous  thromboembolism), chronic (H)     Left     Weight loss, unintentional 2017    15 lb in 4 months       CURRENT MEDICATIONS:  Current Outpatient Medications   Medication Sig Dispense Refill     acetaminophen (TYLENOL) 500 MG tablet Take 2 tablets (1,000 mg) by mouth 2 times daily. May also take 2 tablets (1,000 mg) daily as needed for mild pain.       aspirin 81 MG tablet Take 1 tablet by mouth daily.       atorvastatin (LIPITOR) 40 MG tablet Take 0.5 tablets (20 mg) by mouth daily as directed. 90 tablet 0     calcium carbonate (TUMS) 500 MG chewable tablet Take 1 chew tab by mouth 2 times daily       cholecalciferol 1000 units TABS Take 1,000 Units by mouth daily        Ferrous Sulfate 324 (65 Fe) MG TBEC Take 324 mg by mouth daily       metoprolol tartrate (LOPRESSOR) 25 MG tablet Take 12.5 mg by mouth 2 times daily  180 tablet 3     mirtazapine (REMERON) 15 MG tablet Take 15 mg by mouth At Bedtime.       sertraline (ZOLOFT) 100 MG tablet Take 150 mg by mouth every evening        tamsulosin (FLOMAX) 0.4 MG capsule Take 2 capsules (0.8 mg) by mouth daily 30 capsule 3       PAST SURGICAL HISTORY:  Past Surgical History:   Procedure Laterality Date     C CABG, ARTERY-VEIN, FOUR  2006    LIMA-LAD, SVG-Rt PDA, SVG-OM2, SVG-Diag 1     C CABG, VEIN, SINGLE  1994    1-vessel CABG, SVG->PDRCA      CATARACT IOL, RT/LT Bilateral      COLONOSCOPY  3/13/2014    Procedure: COMBINED COLONOSCOPY, SINGLE BIOPSY/POLYPECTOMY BY BIOPSY;;  Surgeon: Mary Gerber MD;  Location:  GI     COLONOSCOPY N/A 6/22/2015    Procedure: COLONOSCOPY;  Surgeon: Marilin Newman MD;  Location:  GI     COLONOSCOPY N/A 11/7/2018    Procedure: COMBINED COLONOSCOPY, SINGLE OR MULTIPLE BIOPSY/POLYPECTOMY BY BIOPSY;  Surgeon: Roberto Esteban MD;  Location:  GI     EP ICD GENERATOR CHANGE DUAL N/A 12/11/2018    Procedure: EP ICD Generator Change Dual;  Surgeon: Deedee Baird MD;  Location:  HEART CARDIAC CATH LAB     H ABLATION  ATRIAL FLUTTER       HEART CATH DRUG ELUTING STENT PLACEMENT  2012    JEREMY x 2 to LCx     IMPLANT IMPLANTABLE CARDIOVERTER DEFIBRILLATOR  2012    ppm/aicd     KNEE SURGERY      right and left knee surgeries     LAPAROSCOPIC ASSISTED COLECTOMY Right 2019    Procedure: Laparoscopic Converted to Open Transverse Colectomy with Lysis of Adhesions;  Surgeon: Chanelle Guzmán MD;  Location: [facility] OR     LAPAROSCOPIC ASSISTED COLECTOMY LEFT (DESCENDING)  2014    Procedure: LAPAROSCOPIC ASSISTED COLECTOMY LEFT (DESCENDING);  Hand assisted Laparoscopic Sigmoid Colectomy , Laparoscopic mobilization of spleenic flexure *Latex Allergy*Anesthesia General with Block;  Surgeon: Chanelle Guzmán MD;  Location: [facility] OR     OPEN REDUCTION INTERNAL FIXATION RODDING INTRAMEDULLAR FEMUR FRACTURE TABLE Left 3/14/2019    Procedure: Open Reduction Internal Fixation Left Femur Intramedullar Nailing;  Surgeon: Srikanth Avalos MD;  Location: [facility] OR     REPAIR VALVE MITRAL  2006     30-mm Medtronic Julian ring      SELECTIVE LASER TRABECULOPLASTY (SLT) OD (RIGHT EYE)  4/10, ,+13    RE     SELECTIVE LASER TRABECULOPLASTY (SLT) OS (LEFT EYE)  2012     SHOULDER SURGERY      right rotator cuff       ALLERGIES:     Allergies   Allergen Reactions     Latex Rash     Rash       FAMILY HISTORY:  - Premature coronary artery disease  - Atrial fibrillation  - Sudden cardiac death     SOCIAL HISTORY:  Social History     Tobacco Use     Smoking status: Former Smoker     Types: Cigarettes, Cigars     Last attempt to quit: 1968     Years since quittin.5     Smokeless tobacco: Never Used   Substance Use Topics     Alcohol use: Yes     Alcohol/week: 0.0 oz     Comment: 2-3 drinks twice weekly     Drug use: No       ROS:   Constitutional: No fever, chills, or sweats. Weight stable.   ENT: No visual disturbance, ear ache, epistaxis, sore throat.   Cardiovascular: As per HPI.   Respiratory: No cough,  "hemoptysis.    GI: No nausea, vomiting, hematemesis, melena, or hematochezia.   : No hematuria.   Integument: Negative.   Psychiatric: Negative.   Hematologic:  Easy bruising, no easy bleeding.  Neuro: Negative.   Endocrinology: No significant heat or cold intolerance   Musculoskeletal: No myalgia.    Exam:  /55 (BP Location: Right arm, Patient Position: Chair, Cuff Size: Adult Regular)   Pulse 57   Ht 1.702 m (5' 7\")   Wt 67.8 kg (149 lb 6.4 oz)   SpO2 98%   BMI 23.40 kg/m    GENERAL APPEARANCE: healthy, alert and no distress  HEENT: no icterus, no xanthelasmas, normal pupil size and reaction, normal palate, mucosa moist, no central cyanosis  NECK: no adenopathy, no asymmetry, masses, or scars, thyroid normal to palpation and no bruits, JVP not elevated  RESPIRATORY: lungs clear to auscultation - no rales, rhonchi or wheezes, no use of accessory muscles, no retractions, respirations are unlabored, normal respiratory rate  CARDIOVASCULAR: regular rhythm, normal S1 with physiologic split S2, no S3 or S4 and no murmur, click or rub, precordium quiet with normal PMI.  ABDOMEN: soft, non tender, without hepatosplenomegaly, no masses palpable, bowel sounds normal, aorta not enlarged by palpation, no abdominal bruits  EXTREMITIES: peripheral pulses normal, no edema, no bruits  NEURO: alert and oriented to person/place/time, normal speech, gait and affect  VASC: Radial, femoral, dorsalis pedis and posterior tibialis pulses are normal in volumes and symmetric bilaterally. No bruits are heard.  SKIN: no ecchymoses, no rashes    Labs:  CBC RESULTS:   Lab Results   Component Value Date    WBC 7.2 07/01/2019    RBC 3.14 (L) 07/01/2019    HGB 9.5 (L) 07/01/2019    HCT 31.1 (L) 07/01/2019    MCV 99 07/01/2019    MCH 30.3 07/01/2019    MCHC 30.5 (L) 07/01/2019    RDW 15.7 (H) 07/01/2019     07/01/2019       BMP RESULTS:  Lab Results   Component Value Date     04/09/2019    POTASSIUM 4.8 04/09/2019    " CHLORIDE 111 (H) 04/09/2019    CO2 25 04/09/2019    ANIONGAP 5 04/09/2019     (H) 04/09/2019    BUN 53 (H) 04/09/2019    CR 1.25 04/09/2019    GFRESTIMATED 53 (L) 04/09/2019    GFRESTBLACK 61 04/09/2019    KEITH 9.5 04/09/2019        INR RESULTS:  Lab Results   Component Value Date    INR 1.20 (H) 07/01/2019    INR 1.51 (H) 05/15/2019    INR 2.43 (H) 04/30/2019    INR 2.7 (A) 04/23/2019       Procedures:      Assessment and Plan:     Paroxysmal atrial fibrillation  Polymorphic ventricular tachycardia status post ICD implantation  Recent multiple falls and fracture    The pressing question now is whether or not patient would be a candidate for the watchman device for stroke prevention.  I discussed in detail the aims, risks, and alternatives to watchman versus anticoagulation for stroke prevention and atrial fibrillation.  After an extensive discussion, patient and his wife decided to defer this question and focus on rehabilitation.  Hopefully, with rehabilitation patient will be able to regain strength and be able to ambulate safely.  For now we will continue to withhold anticoagulation and patient and wife accept the risks of stroke in the absence of anticoagulation.    I will see patient in clinic again in approximately 3 months.  All questions and concerns were addressed and patient and wife are happy with plan.      CC  Patient Care Team:  Roberto Sarmiento MD as PCP - General (Internal Medicine)  Francisco Gilliam MD as MD (Oncology)  Omero Srinivasan MD as MD (Ophthalmology)  Hay Arnett MD as MD (Internal Medicine)  NIKHIL Wheeler MD as MD (Dermatology)  Deedee Baird MD as MD (Cardiology)  Bhavana Mckeon MD as MD (Internal Medicine)  Naveed Ram MD as Resident  Guzman Boudreaux DPM (Podiatry)  Joshua Centeno MD as MD (Family Practice)  Brett Petersen MD as MD (Dermapathology)  Clarice Magallanes APRN CNP as Nurse Practitioner (Nurse  Practitioner - Family)  Lashawn Jackson MD as MD (Dermatology)  Frida Desai MD as MD (Cardiology)  Juanito Woodard RN as Nurse Coordinator (Cardiology)  Marci Palmer RN as Nurse Coordinator (Oncology)  Lashawn Cool, BYRON as Specialty Care Coordinator (Cardiology)  [facility] ([place]  Jaye Alexander APRN CNP as Nurse Practitioner (Nurse Practitioner - Family)  Alona Lowe CNP as Nurse Practitioner (Nurse Practitioner)  Nusrat Mayo, BYRON as Specialty Care Coordinator (Urology)  Myesha Arreola, MARIA LUISA CNP as Assigned PCP  CYNTHIA CARRION

## 2019-07-01 NOTE — LETTER
7/1/2019      RE: Noe Florence  [address] [facility] [address]       Dear Colleague,    Thank you for the opportunity to participate in the care of your patient, Noe Florence, at the [facility]. Please see a copy of my visit note below.    HPI: Purpose of visit: Patient comes back for follow-up of paroxysmal atrial fibrillation and after having suffered multiple falls.    The patient has a past medical history significant for  HTN, HLD, CKD3, CAD s/p CABG x2, DESx2, polymorphic VT s/p ICD placement (5/2012, gen change 12/2018), and AF/AFL s/p CTI (6/2014) and right atrial appendage atrial tachycardia ablation (9/2014). His ICD reached REBECCA and he underwent an ICD gen change on 12/11/2018.    Importantly, patient underwent colorectal surgery for colon cancer in February 2019.  In March 2019, patient fell and suffered a left hip fracture and underwent surgery.  In May 2019 patient fell again and suffered a right elbow fracture.  He did not undergo surgery for the elbow fracture.  Patient is currently at the rehab facility and the hope is that in a few weeks he would be able to bear weight and undergo rehabilitation.  He is currently using a walker and a walking cane to ambulate.  His warfarin has been stopped for the last few months.    Patient denies any palpitations, exertional dyspnea, exertional angina, frequent lightheadedness, presyncope or syncope.        PAST MEDICAL HISTORY:  Past Medical History:   Diagnosis Date     Advanced open-angle glaucoma      Atrial fibrillation (H)      CKD (chronic kidney disease), stage III (H) 2005     Colon cancer (H)     Stage II-B colon cancer     Coronary artery disease     s/p CABG x 2, JEREMY x 2     Diverticulitis      Hyperlipidemia      Hypertension      Ischemic cardiomyopathy      MGUS (monoclonal gammopathy of unknown significance)      Nonsenile cataract     BE     Osteoporosis     left hip  fracture     Polymorphic ventricular tachycardia (H)      Polymyalgia rheumatica (H)      PVD (posterior vitreous detachment), both eyes 2005     S/P ablation of atrial flutter 6/20/14    CTI     Stented coronary artery      SVT (supraventricular tachycardia) (H)     PPM/AICD for NSVT     Upper leg DVT (deep venous thromboembolism), chronic (H)     Left     Weight loss, unintentional 2017    15 lb in 4 months       CURRENT MEDICATIONS:  Current Outpatient Medications   Medication Sig Dispense Refill     acetaminophen (TYLENOL) 500 MG tablet Take 2 tablets (1,000 mg) by mouth 2 times daily. May also take 2 tablets (1,000 mg) daily as needed for mild pain.       aspirin 81 MG tablet Take 1 tablet by mouth daily.       atorvastatin (LIPITOR) 40 MG tablet Take 0.5 tablets (20 mg) by mouth daily as directed. 90 tablet 0     calcium carbonate (TUMS) 500 MG chewable tablet Take 1 chew tab by mouth 2 times daily       cholecalciferol 1000 units TABS Take 1,000 Units by mouth daily        Ferrous Sulfate 324 (65 Fe) MG TBEC Take 324 mg by mouth daily       metoprolol tartrate (LOPRESSOR) 25 MG tablet Take 12.5 mg by mouth 2 times daily  180 tablet 3     mirtazapine (REMERON) 15 MG tablet Take 15 mg by mouth At Bedtime.       sertraline (ZOLOFT) 100 MG tablet Take 150 mg by mouth every evening        tamsulosin (FLOMAX) 0.4 MG capsule Take 2 capsules (0.8 mg) by mouth daily 30 capsule 3       PAST SURGICAL HISTORY:  Past Surgical History:   Procedure Laterality Date     C CABG, ARTERY-VEIN, FOUR  2006    LIMA-LAD, SVG-Rt PDA, SVG-OM2, SVG-Diag 1     C CABG, VEIN, SINGLE  1994    1-vessel CABG, SVG->PDRCA      CATARACT IOL, RT/LT Bilateral      COLONOSCOPY  3/13/2014    Procedure: COMBINED COLONOSCOPY, SINGLE BIOPSY/POLYPECTOMY BY BIOPSY;;  Surgeon: Mary Gerber MD;  Location:  GI     COLONOSCOPY N/A 6/22/2015    Procedure: COLONOSCOPY;  Surgeon: Marilin Newman MD;  Location:  GI     COLONOSCOPY N/A  2018    Procedure: COMBINED COLONOSCOPY, SINGLE OR MULTIPLE BIOPSY/POLYPECTOMY BY BIOPSY;  Surgeon: Roberto Esteban MD;  Location: [facility]     EP ICD GENERATOR CHANGE DUAL N/A 2018    Procedure: EP ICD Generator Change Dual;  Surgeon: Deedee Baird MD;  Location:  HEART CARDIAC CATH LAB     H ABLATION ATRIAL FLUTTER       HEART CATH DRUG ELUTING STENT PLACEMENT  2012    JEREMY x 2 to LCx     IMPLANT IMPLANTABLE CARDIOVERTER DEFIBRILLATOR  2012    ppm/aicd     KNEE SURGERY      right and left knee surgeries     LAPAROSCOPIC ASSISTED COLECTOMY Right 2019    Procedure: Laparoscopic Converted to Open Transverse Colectomy with Lysis of Adhesions;  Surgeon: Chanelel Guzmán MD;  Location:  OR     LAPAROSCOPIC ASSISTED COLECTOMY LEFT (DESCENDING)  2014    Procedure: LAPAROSCOPIC ASSISTED COLECTOMY LEFT (DESCENDING);  Hand assisted Laparoscopic Sigmoid Colectomy , Laparoscopic mobilization of spleenic flexure *Latex Allergy*Anesthesia General with Block;  Surgeon: Chanelle Guzmán MD;  Location: [facility]     OPEN REDUCTION INTERNAL FIXATION RODDING INTRAMEDULLAR FEMUR FRACTURE TABLE Left 3/14/2019    Procedure: Open Reduction Internal Fixation Left Femur Intramedullar Nailing;  Surgeon: Srikanth Avalos MD;  Location:  OR     REPAIR VALVE MITRAL  2006     30-mm Medtronic Julian ring      SELECTIVE LASER TRABECULOPLASTY (SLT) OD (RIGHT EYE)  4/10, ,+13    RE     SELECTIVE LASER TRABECULOPLASTY (SLT) OS (LEFT EYE)  2012     SHOULDER SURGERY      right rotator cuff       ALLERGIES:     Allergies   Allergen Reactions     Latex Rash     Rash       FAMILY HISTORY:  - Premature coronary artery disease  - Atrial fibrillation  - Sudden cardiac death     SOCIAL HISTORY:  Social History     Tobacco Use     Smoking status: Former Smoker     Types: Cigarettes, Cigars     Last attempt to quit: 1968     Years since quittin.5     Smokeless tobacco: Never Used  "  Substance Use Topics     Alcohol use: Yes     Alcohol/week: 0.0 oz     Comment: 2-3 drinks twice weekly     Drug use: No       ROS:   Constitutional: No fever, chills, or sweats. Weight stable.   ENT: No visual disturbance, ear ache, epistaxis, sore throat.   Cardiovascular: As per HPI.   Respiratory: No cough, hemoptysis.    GI: No nausea, vomiting, hematemesis, melena, or hematochezia.   : No hematuria.   Integument: Negative.   Psychiatric: Negative.   Hematologic:  Easy bruising, no easy bleeding.  Neuro: Negative.   Endocrinology: No significant heat or cold intolerance   Musculoskeletal: No myalgia.    Exam:  /55 (BP Location: Right arm, Patient Position: Chair, Cuff Size: Adult Regular)   Pulse 57   Ht 1.702 m (5' 7\")   Wt 67.8 kg (149 lb 6.4 oz)   SpO2 98%   BMI 23.40 kg/m     GENERAL APPEARANCE: healthy, alert and no distress  HEENT: no icterus, no xanthelasmas, normal pupil size and reaction, normal palate, mucosa moist, no central cyanosis  NECK: no adenopathy, no asymmetry, masses, or scars, thyroid normal to palpation and no bruits, JVP not elevated  RESPIRATORY: lungs clear to auscultation - no rales, rhonchi or wheezes, no use of accessory muscles, no retractions, respirations are unlabored, normal respiratory rate  CARDIOVASCULAR: regular rhythm, normal S1 with physiologic split S2, no S3 or S4 and no murmur, click or rub, precordium quiet with normal PMI.  ABDOMEN: soft, non tender, without hepatosplenomegaly, no masses palpable, bowel sounds normal, aorta not enlarged by palpation, no abdominal bruits  EXTREMITIES: peripheral pulses normal, no edema, no bruits  NEURO: alert and oriented to person/place/time, normal speech, gait and affect  VASC: Radial, femoral, dorsalis pedis and posterior tibialis pulses are normal in volumes and symmetric bilaterally. No bruits are heard.  SKIN: no ecchymoses, no rashes    Labs:  CBC RESULTS:   Lab Results   Component Value Date    WBC 7.2 " 07/01/2019    RBC 3.14 (L) 07/01/2019    HGB 9.5 (L) 07/01/2019    HCT 31.1 (L) 07/01/2019    MCV 99 07/01/2019    MCH 30.3 07/01/2019    MCHC 30.5 (L) 07/01/2019    RDW 15.7 (H) 07/01/2019     07/01/2019       BMP RESULTS:  Lab Results   Component Value Date     04/09/2019    POTASSIUM 4.8 04/09/2019    CHLORIDE 111 (H) 04/09/2019    CO2 25 04/09/2019    ANIONGAP 5 04/09/2019     (H) 04/09/2019    BUN 53 (H) 04/09/2019    CR 1.25 04/09/2019    GFRESTIMATED 53 (L) 04/09/2019    GFRESTBLACK 61 04/09/2019    KEITH 9.5 04/09/2019        INR RESULTS:  Lab Results   Component Value Date    INR 1.20 (H) 07/01/2019    INR 1.51 (H) 05/15/2019    INR 2.43 (H) 04/30/2019    INR 2.7 (A) 04/23/2019       Procedures:      Assessment and Plan:     Paroxysmal atrial fibrillation  Polymorphic ventricular tachycardia status post ICD implantation  Recent multiple falls and fracture    The pressing question now is whether or not patient would be a candidate for the watchman device for stroke prevention.  I discussed in detail the aims, risks, and alternatives to watchman versus anticoagulation for stroke prevention and atrial fibrillation.  After an extensive discussion, patient and his wife decided to defer this question and focus on rehabilitation.  Hopefully, with rehabilitation patient will be able to regain strength and be able to ambulate safely.  For now we will continue to withhold anticoagulation and patient and wife accept the risks of stroke in the absence of anticoagulation.    I will see patient in clinic again in approximately 3 months.  All questions and concerns were addressed and patient and wife are happy with plan.      CC  Patient Care Team:  Roberto Sarmiento MD as PCP - General (Internal Medicine)  Francisco Gilliam MD as MD (Oncology)  Omero Srinivasan MD as MD (Ophthalmology)  Hay Arnett MD as MD (Internal Medicine)  NIKHIL Wheeler MD as MD (Dermatology)  Deedee Baird  MD Jacy as MD (Cardiology)  Bhavana Mckeon MD as MD (Internal Medicine)  Naveed Ram MD as Resident  Guzman Boudreaux DPM (Podiatry)  Joshua Centeno MD as MD (Family Practice)  Brett Petersen MD as MD (Dermapathology)  Clarice Magallanes, MARIA LUISA CNP as Nurse Practitioner (Nurse Practitioner - Family)  Lashawn Jackson MD as MD (Dermatology)  Frida Desai MD as MD (Cardiology)  uJanito Woodard RN as Nurse Coordinator (Cardiology)  Marci Palmer, BYRON as Nurse Coordinator (Oncology)  Lashawn Cool, BYRON as Specialty Care Coordinator (Cardiology)  [facility] ([place]  Jaye Alexander, MARIA LUISA CNP as Nurse Practitioner (Nurse Practitioner - Family)  Alona Lowe CNP as Nurse Practitioner (Nurse Practitioner)  Nusrat Mayo, BYRON as Specialty Care Coordinator (Urology)  Myesha Arreola, MARIA LUISA CNP as Assigned PCP  CYNTHIA CARRION

## 2019-07-01 NOTE — PATIENT INSTRUCTIONS
It was a pleasure to see you in clinic today.  Please do not hesitate to call with any questions or concerns.  We look forward to seeing you in clinic at your next device check on 11/4/19.     Trena Guzman, RN, BSN  Electrophysiology Nurse Clinician  [facility]    During Business Hours Please Call:  [phone]  After Hours Please Call:  [phone] - select option #4 and ask for job code [phone]

## 2019-07-02 LAB
MDC_IDC_EPISODE_DTM: NORMAL
MDC_IDC_EPISODE_DURATION: 2 S
MDC_IDC_EPISODE_ID: 61
MDC_IDC_EPISODE_TYPE: NORMAL
MDC_IDC_LEAD_IMPLANT_DT: NORMAL
MDC_IDC_LEAD_IMPLANT_DT: NORMAL
MDC_IDC_LEAD_LOCATION: NORMAL
MDC_IDC_LEAD_LOCATION: NORMAL
MDC_IDC_LEAD_LOCATION_DETAIL_1: NORMAL
MDC_IDC_LEAD_LOCATION_DETAIL_1: NORMAL
MDC_IDC_LEAD_MFG: NORMAL
MDC_IDC_LEAD_MFG: NORMAL
MDC_IDC_LEAD_MODEL: NORMAL
MDC_IDC_LEAD_MODEL: NORMAL
MDC_IDC_LEAD_POLARITY_TYPE: NORMAL
MDC_IDC_LEAD_POLARITY_TYPE: NORMAL
MDC_IDC_LEAD_SERIAL: NORMAL
MDC_IDC_LEAD_SERIAL: NORMAL
MDC_IDC_MSMT_BATTERY_DTM: NORMAL
MDC_IDC_MSMT_BATTERY_REMAINING_LONGEVITY: 114 MO
MDC_IDC_MSMT_BATTERY_RRT_TRIGGER: 2.73
MDC_IDC_MSMT_BATTERY_STATUS: NORMAL
MDC_IDC_MSMT_BATTERY_VOLTAGE: 3.04 V
MDC_IDC_MSMT_CAP_CHARGE_DTM: NORMAL
MDC_IDC_MSMT_CAP_CHARGE_ENERGY: 18 J
MDC_IDC_MSMT_CAP_CHARGE_TIME: 4.08
MDC_IDC_MSMT_CAP_CHARGE_TYPE: NORMAL
MDC_IDC_MSMT_LEADCHNL_RA_IMPEDANCE_VALUE: 456 OHM
MDC_IDC_MSMT_LEADCHNL_RA_PACING_THRESHOLD_AMPLITUDE: 0.5 V
MDC_IDC_MSMT_LEADCHNL_RA_PACING_THRESHOLD_AMPLITUDE: 0.62 V
MDC_IDC_MSMT_LEADCHNL_RA_PACING_THRESHOLD_PULSEWIDTH: 0.4 MS
MDC_IDC_MSMT_LEADCHNL_RA_PACING_THRESHOLD_PULSEWIDTH: 0.4 MS
MDC_IDC_MSMT_LEADCHNL_RA_SENSING_INTR_AMPL: 0.88 MV
MDC_IDC_MSMT_LEADCHNL_RA_SENSING_INTR_AMPL: 1 MV
MDC_IDC_MSMT_LEADCHNL_RV_IMPEDANCE_VALUE: 285 OHM
MDC_IDC_MSMT_LEADCHNL_RV_IMPEDANCE_VALUE: 361 OHM
MDC_IDC_MSMT_LEADCHNL_RV_PACING_THRESHOLD_AMPLITUDE: 0.75 V
MDC_IDC_MSMT_LEADCHNL_RV_PACING_THRESHOLD_AMPLITUDE: 1 V
MDC_IDC_MSMT_LEADCHNL_RV_PACING_THRESHOLD_PULSEWIDTH: 0.4 MS
MDC_IDC_MSMT_LEADCHNL_RV_PACING_THRESHOLD_PULSEWIDTH: 0.4 MS
MDC_IDC_MSMT_LEADCHNL_RV_SENSING_INTR_AMPL: 7.38 MV
MDC_IDC_MSMT_LEADCHNL_RV_SENSING_INTR_AMPL: 9.12 MV
MDC_IDC_PG_IMPLANT_DTM: NORMAL
MDC_IDC_PG_MFG: NORMAL
MDC_IDC_PG_MODEL: NORMAL
MDC_IDC_PG_SERIAL: NORMAL
MDC_IDC_PG_TYPE: NORMAL
MDC_IDC_SESS_CLINIC_NAME: NORMAL
MDC_IDC_SESS_DTM: NORMAL
MDC_IDC_SESS_TYPE: NORMAL
MDC_IDC_SET_BRADY_AT_MODE_SWITCH_RATE: 171 {BEATS}/MIN
MDC_IDC_SET_BRADY_HYSTRATE: NORMAL
MDC_IDC_SET_BRADY_LOWRATE: 60 {BEATS}/MIN
MDC_IDC_SET_BRADY_MAX_SENSOR_RATE: 130 {BEATS}/MIN
MDC_IDC_SET_BRADY_MAX_TRACKING_RATE: 130 {BEATS}/MIN
MDC_IDC_SET_BRADY_MODE: NORMAL
MDC_IDC_SET_BRADY_PAV_DELAY_LOW: 180 MS
MDC_IDC_SET_BRADY_SAV_DELAY_LOW: 150 MS
MDC_IDC_SET_LEADCHNL_RA_PACING_AMPLITUDE: 1.5 V
MDC_IDC_SET_LEADCHNL_RA_PACING_ANODE_ELECTRODE_1: NORMAL
MDC_IDC_SET_LEADCHNL_RA_PACING_ANODE_LOCATION_1: NORMAL
MDC_IDC_SET_LEADCHNL_RA_PACING_CAPTURE_MODE: NORMAL
MDC_IDC_SET_LEADCHNL_RA_PACING_CATHODE_ELECTRODE_1: NORMAL
MDC_IDC_SET_LEADCHNL_RA_PACING_CATHODE_LOCATION_1: NORMAL
MDC_IDC_SET_LEADCHNL_RA_PACING_POLARITY: NORMAL
MDC_IDC_SET_LEADCHNL_RA_PACING_PULSEWIDTH: 0.4 MS
MDC_IDC_SET_LEADCHNL_RA_SENSING_ANODE_ELECTRODE_1: NORMAL
MDC_IDC_SET_LEADCHNL_RA_SENSING_ANODE_LOCATION_1: NORMAL
MDC_IDC_SET_LEADCHNL_RA_SENSING_CATHODE_ELECTRODE_1: NORMAL
MDC_IDC_SET_LEADCHNL_RA_SENSING_CATHODE_LOCATION_1: NORMAL
MDC_IDC_SET_LEADCHNL_RA_SENSING_POLARITY: NORMAL
MDC_IDC_SET_LEADCHNL_RA_SENSING_SENSITIVITY: 0.3 MV
MDC_IDC_SET_LEADCHNL_RV_PACING_AMPLITUDE: 2 V
MDC_IDC_SET_LEADCHNL_RV_PACING_ANODE_ELECTRODE_1: NORMAL
MDC_IDC_SET_LEADCHNL_RV_PACING_ANODE_LOCATION_1: NORMAL
MDC_IDC_SET_LEADCHNL_RV_PACING_CAPTURE_MODE: NORMAL
MDC_IDC_SET_LEADCHNL_RV_PACING_CATHODE_ELECTRODE_1: NORMAL
MDC_IDC_SET_LEADCHNL_RV_PACING_CATHODE_LOCATION_1: NORMAL
MDC_IDC_SET_LEADCHNL_RV_PACING_POLARITY: NORMAL
MDC_IDC_SET_LEADCHNL_RV_PACING_PULSEWIDTH: 0.4 MS
MDC_IDC_SET_LEADCHNL_RV_SENSING_ANODE_ELECTRODE_1: NORMAL
MDC_IDC_SET_LEADCHNL_RV_SENSING_ANODE_LOCATION_1: NORMAL
MDC_IDC_SET_LEADCHNL_RV_SENSING_CATHODE_ELECTRODE_1: NORMAL
MDC_IDC_SET_LEADCHNL_RV_SENSING_CATHODE_LOCATION_1: NORMAL
MDC_IDC_SET_LEADCHNL_RV_SENSING_POLARITY: NORMAL
MDC_IDC_SET_LEADCHNL_RV_SENSING_SENSITIVITY: 0.45 MV
MDC_IDC_SET_ZONE_DETECTION_BEATS_DENOMINATOR: 24 {BEATS}
MDC_IDC_SET_ZONE_DETECTION_BEATS_NUMERATOR: 18 {BEATS}
MDC_IDC_SET_ZONE_DETECTION_INTERVAL: 300 MS
MDC_IDC_SET_ZONE_DETECTION_INTERVAL: 320 MS
MDC_IDC_SET_ZONE_DETECTION_INTERVAL: 350 MS
MDC_IDC_SET_ZONE_DETECTION_INTERVAL: 430 MS
MDC_IDC_SET_ZONE_DETECTION_INTERVAL: NORMAL
MDC_IDC_SET_ZONE_TYPE: NORMAL
MDC_IDC_STAT_AT_BURDEN_PERCENT: 0 %
MDC_IDC_STAT_AT_DTM_END: NORMAL
MDC_IDC_STAT_AT_DTM_START: NORMAL
MDC_IDC_STAT_BRADY_AP_VP_PERCENT: 3.74 %
MDC_IDC_STAT_BRADY_AP_VS_PERCENT: 41.83 %
MDC_IDC_STAT_BRADY_AS_VP_PERCENT: 0.38 %
MDC_IDC_STAT_BRADY_AS_VS_PERCENT: 54.05 %
MDC_IDC_STAT_BRADY_DTM_END: NORMAL
MDC_IDC_STAT_BRADY_DTM_START: NORMAL
MDC_IDC_STAT_BRADY_RA_PERCENT_PACED: 45.05 %
MDC_IDC_STAT_BRADY_RV_PERCENT_PACED: 4.45 %
MDC_IDC_STAT_EPISODE_RECENT_COUNT: 0
MDC_IDC_STAT_EPISODE_RECENT_COUNT: 1
MDC_IDC_STAT_EPISODE_RECENT_COUNT_DTM_END: NORMAL
MDC_IDC_STAT_EPISODE_RECENT_COUNT_DTM_START: NORMAL
MDC_IDC_STAT_EPISODE_TOTAL_COUNT: 0
MDC_IDC_STAT_EPISODE_TOTAL_COUNT: 2
MDC_IDC_STAT_EPISODE_TOTAL_COUNT: 59
MDC_IDC_STAT_EPISODE_TOTAL_COUNT_DTM_END: NORMAL
MDC_IDC_STAT_EPISODE_TOTAL_COUNT_DTM_START: NORMAL
MDC_IDC_STAT_EPISODE_TYPE: NORMAL
MDC_IDC_STAT_TACHYTHERAPY_ATP_DELIVERED_RECENT: 0
MDC_IDC_STAT_TACHYTHERAPY_ATP_DELIVERED_TOTAL: 0
MDC_IDC_STAT_TACHYTHERAPY_RECENT_DTM_END: NORMAL
MDC_IDC_STAT_TACHYTHERAPY_RECENT_DTM_START: NORMAL
MDC_IDC_STAT_TACHYTHERAPY_SHOCKS_ABORTED_RECENT: 0
MDC_IDC_STAT_TACHYTHERAPY_SHOCKS_ABORTED_TOTAL: 0
MDC_IDC_STAT_TACHYTHERAPY_SHOCKS_DELIVERED_RECENT: 0
MDC_IDC_STAT_TACHYTHERAPY_SHOCKS_DELIVERED_TOTAL: 0
MDC_IDC_STAT_TACHYTHERAPY_TOTAL_DTM_END: NORMAL
MDC_IDC_STAT_TACHYTHERAPY_TOTAL_DTM_START: NORMAL

## 2019-07-08 ENCOUNTER — NURSING HOME VISIT (OUTPATIENT)
Dept: GERIATRICS | Facility: CLINIC | Age: 84
End: 2019-07-08
Payer: COMMERCIAL

## 2019-07-08 VITALS
BODY MASS INDEX: 22.91 KG/M2 | DIASTOLIC BLOOD PRESSURE: 59 MMHG | WEIGHT: 146 LBS | HEART RATE: 66 BPM | SYSTOLIC BLOOD PRESSURE: 109 MMHG | TEMPERATURE: 97 F | RESPIRATION RATE: 16 BRPM | HEIGHT: 67 IN | OXYGEN SATURATION: 97 %

## 2019-07-08 DIAGNOSIS — I25.5 ISCHEMIC CARDIOMYOPATHY: ICD-10-CM

## 2019-07-08 DIAGNOSIS — Z91.81 PERSONAL HISTORY OF FALL: ICD-10-CM

## 2019-07-08 DIAGNOSIS — Z87.81 HX OF FRACTURE OF FEMUR: ICD-10-CM

## 2019-07-08 DIAGNOSIS — F32.A DEPRESSION, UNSPECIFIED DEPRESSION TYPE: ICD-10-CM

## 2019-07-08 DIAGNOSIS — R63.4 WEIGHT LOSS, UNINTENTIONAL: ICD-10-CM

## 2019-07-08 DIAGNOSIS — C18.4 MALIGNANT NEOPLASM OF TRANSVERSE COLON (H): ICD-10-CM

## 2019-07-08 DIAGNOSIS — Z86.718 HISTORY OF DEEP VENOUS THROMBOSIS: ICD-10-CM

## 2019-07-08 DIAGNOSIS — I10 HYPERTENSION, UNSPECIFIED TYPE: ICD-10-CM

## 2019-07-08 DIAGNOSIS — M62.81 GENERALIZED MUSCLE WEAKNESS: ICD-10-CM

## 2019-07-08 DIAGNOSIS — R39.15 URINARY URGENCY: ICD-10-CM

## 2019-07-08 DIAGNOSIS — Z95.1 S/P CABG (CORONARY ARTERY BYPASS GRAFT): ICD-10-CM

## 2019-07-08 DIAGNOSIS — N18.30 CKD (CHRONIC KIDNEY DISEASE), STAGE III (H): ICD-10-CM

## 2019-07-08 DIAGNOSIS — R53.81 PHYSICAL DECONDITIONING: ICD-10-CM

## 2019-07-08 DIAGNOSIS — R35.0 BENIGN PROSTATIC HYPERPLASIA WITH URINARY FREQUENCY: ICD-10-CM

## 2019-07-08 DIAGNOSIS — I25.10 CORONARY ARTERY DISEASE INVOLVING NATIVE CORONARY ARTERY OF NATIVE HEART, ANGINA PRESENCE UNSPECIFIED: ICD-10-CM

## 2019-07-08 DIAGNOSIS — S42.401D CLOSED FRACTURE OF DISTAL END OF RIGHT HUMERUS WITH ROUTINE HEALING, UNSPECIFIED FRACTURE MORPHOLOGY, SUBSEQUENT ENCOUNTER: Primary | ICD-10-CM

## 2019-07-08 DIAGNOSIS — R26.89 MULTIFACTORIAL GAIT DISORDER: ICD-10-CM

## 2019-07-08 DIAGNOSIS — S01.01XD LACERATION OF SCALP, SUBSEQUENT ENCOUNTER: ICD-10-CM

## 2019-07-08 DIAGNOSIS — I48.20 CHRONIC ATRIAL FIBRILLATION (H): ICD-10-CM

## 2019-07-08 DIAGNOSIS — E78.5 HYPERLIPIDEMIA, UNSPECIFIED HYPERLIPIDEMIA TYPE: ICD-10-CM

## 2019-07-08 DIAGNOSIS — D64.9 ANEMIA, UNSPECIFIED TYPE: ICD-10-CM

## 2019-07-08 DIAGNOSIS — N40.1 BENIGN PROSTATIC HYPERPLASIA WITH URINARY FREQUENCY: ICD-10-CM

## 2019-07-08 PROCEDURE — 99309 SBSQ NF CARE MODERATE MDM 30: CPT | Performed by: NURSE PRACTITIONER

## 2019-07-08 RX ORDER — ACETAMINOPHEN 500 MG
1000 TABLET ORAL 3 TIMES DAILY PRN
Start: 2019-07-08 | End: 2020-02-04

## 2019-07-08 ASSESSMENT — MIFFLIN-ST. JEOR: SCORE: 1310.88

## 2019-07-08 NOTE — LETTER
7/8/2019        RE: Noe Florence  [address] [facility] [address]        [facility]  [facility] Record Number:  [id number]  Place of Service where encounter took place:  [facility] ([facility]) [[id number]]  Chief Complaint   Patient presents with     RECHECK       HPI:    Noe Florence  is a 84 year old (1935), who is being seen today for an episodic care visit.  HPI information obtained from: facility chart records, facility staff, patient report and [facility] chart review. Today's concern is:     Closed fracture of distal end of right humerus with routine healing, unspecified fracture morphology, subsequent encounter  Personal history of fall  Laceration of scalp, subsequent encounter  CKD (chronic kidney disease), stage III (H)  Chronic atrial fibrillation (H)  Coronary artery disease involving native coronary artery of native heart, angina presence unspecified  S/P CABG (coronary artery bypass graft)  Ischemic cardiomyopathy  Hypertension, unspecified type  Hyperlipidemia, unspecified hyperlipidemia type  Hx of fracture of femur  History of deep venous thrombosis  Malignant neoplasm of transverse colon (H)  Benign prostatic hyperplasia with urinary frequency  Urinary urgency  Weight loss, unintentional  Anemia, unspecified type  Depression, unspecified depression type  Multifactorial gait disorder  Generalized muscle weakness  Physical deconditioning     Per Epic note/hx:  Patient is a pleasant 84 year old gentleman with PMH of HTN,HLD, CKD3, CAD s/p CABG x 2, JEREMY x 2, Vtach s/p ICD placement (5, 2012, generator change 12/2018), a fib s/p CTI (6/2014) and ablation (9/2014), sigmoid cancer s/p sigmoid colectomy, 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  His humerus fracture was reduced  "in the ED, Ortho was consulted and ultimately decision made to treat medically with splint and f/u with Ortho for likely cast.  Discussion with family and patient about OA and decision made to stop warfarin until f/u with Cardiology.  Scalp laceration was reportedly sutured with expected removal date 6/1/19 without sutures in place at today's visit.      05/29/19 patient had f/u with Orthopedics where patient was transitioned from splint into cast, continue NWB of RUE, f/u in 2 weeks.     7/3/19 Orthopedics f/u with cast removed and hinged elbow brace in place, orders for OK to begin WBAT for ADLs and OK for ambulation with walker with WB on RUE.    Patient is met today in his TCU room where he denies much pain, reporting it overall as \"very little\" and tolerable.  He denies any SOB, CP, HA, lightheadedness, upset stomach. He reports his bowels are \"iffy\" with fluctuating constipation \"here and there\" but overall are OK.      Past Medical and Surgical History reviewed in Epic today.    MEDICATIONS:  Current Outpatient Medications   Medication Sig Dispense Refill     acetaminophen (TYLENOL) 500 MG tablet Take 2 tablets (1,000 mg) by mouth 3 times daily as needed for mild pain       aspirin 81 MG tablet Take 1 tablet by mouth daily.       atorvastatin (LIPITOR) 40 MG tablet Take 0.5 tablets (20 mg) by mouth daily as directed. 90 tablet 0     calcium carbonate (TUMS) 500 MG chewable tablet Take 1 chew tab by mouth 2 times daily       cholecalciferol 1000 units TABS Take 1,000 Units by mouth daily        Ferrous Sulfate 324 (65 Fe) MG TBEC Take 324 mg by mouth daily       metoprolol tartrate (LOPRESSOR) 25 MG tablet Take 12.5 mg by mouth 2 times daily  180 tablet 3     mirtazapine (REMERON) 15 MG tablet Take 15 mg by mouth At Bedtime.       sertraline (ZOLOFT) 100 MG tablet Take 150 mg by mouth every evening        tamsulosin (FLOMAX) 0.4 MG capsule Take 2 capsules (0.8 mg) by mouth daily 30 capsule 3     REVIEW OF " "SYSTEMS:  Limited secondary to cognitive impairment but today pt reports 10 point ROS of systems including Constitutional, Eyes, Respiratory, Cardiovascular, Gastroenterology, Genitourinary, Integumentary, Musculoskeletal, Psychiatric were all negative except for pertinent positives noted in my HPI.    Objective:  /59   Pulse 66   Temp 97  F (36.1  C)   Resp 16   Ht 1.702 m (5' 7\")   Wt 66.2 kg (146 lb)   SpO2 97%   BMI 22.87 kg/m     Exam:  GENERAL APPEARANCE:  Alert, in no distress, deconditioned, thin, pleasant, confused to details   RESP:  respiratory effort and palpation of chest normal, auscultation of lungs clear, no respiratory distress, on RA, no cough   CV:  Palpation and auscultation of heart done , rate and rhythm irregular, no murmur, scant LE peripheral edema,   ABDOMEN:  normal bowel sounds, soft, nontender, no hepatosplenomegaly or other masses  M/S:   Gait and station with W/C for mobility around TCU, working with ambulation with therapy with walker, Digits and nails with arthritic changes, bilat knee old scars, reduced muscle mass, hinged elbow RUE brace in place with ext set to 0 degree, flexion to 110 degrees.   SKIN:  Inspection and Palpation of skin and subcutaneous tissue with various bruised areas, fragile skin, scalp laceration/abrasion healed.    PSYCH:  insight and judgement, memory with mild impairment, affect and mood normal, follows commands readily       Labs:   Labs done while at [facility] done by [facility]. Pertinent results are: reviewed    ASSESSMENT/PLAN:  Closed fracture of distal end of right humerus with routine healing, unspecified fracture morphology, subsequent encounter  Personal history of fall  S/p fall, RUE humerus fracture with reduction in ED  Ortho consulted, splinted, then casted, now with hinged right elbow brace  7/3/19 orders given for WBAT with ADLs and ambulation with walker.      Analgesia with (changed) Tylenol 1000 mg BID and " "daily PRN - x0 usage.     Patient reports \"very little\" pain. Discussion regarding changing Tylenol to PRN only - patient in agreement.      PLAN:  - f/u with Ortho as planned   - OK for WB to begin ambulation with walker, etc.   - change (new) Tylenol to 1000 mg TID PRN    Laceration of scalp, subsequent encounter - resolved   Head CT neg in ED for acute pathology  Exam today with area healed   Resolved      CKD (chronic kidney disease), stage III (H)  BL Creat around 1.2  Mild ARSALAN on CKD this hospitalization  Last few BMPs:   5/22/19: BUN x, Creat 1.42, GFR 45  5/23/19: BUN 58, Creat 1.34, GFR 48  5/24/19: BUN 52, Creat 1.36, GFR 47  6/3/19: BUN 56, Creat 1.36, GFR 47     PLAN:  - Will avoid nephrotoxic agents (such as ACEI/ARB/NSAIDs, IV contrast) and mindful prescribing with renal dosing.        Chronic atrial fibrillation (H)  S/P ablation of atrial flutter  F/B: Cardiology, ROCIO Manley and Jaye Alexander CNP  S/p CTI (6/2014), right atrial appendage atrial tach ablation (9/2014), ICD placement (5/2012 - [employer] dual lead ICD)      RvB of OA discussed this hosp after fall and scalp laceration and decision to hold warfarin at this time until f/u with Cardiology  Remains on PTA ASA 81 mg daily     On PTA Metoprolol 12.5 mg BID for rate control  HRs 60s mostly, irregular today      PLAN:  - PTA warfarin on hold until Cardiology f/u (can discuss RvB with patient's wife and if wish for restart, can do this. Patient's wife not present today)  - PTA ASA 81 mg daily remains  - PTA metoprolol 12.5 mg BID remains  - monitor for rate control.   - f/u with device check as scheduled.      Coronary artery disease involving native coronary artery of native heart, angina presence unspecified  S/P CABG (coronary artery bypass graft) x 2  Ischemic cardiomyopathy  Hypertension, unspecified type  Hyperlipidemia, unspecified hyperlipidemia type  F/B: Cardiology, ROCIO Manley and Jaye Alexander CNP  s/p CABG x 2, JEREMY x 2, " Vtach s/p ICD placement (5, 2012, generator change 12/2018), a fib s/p CTI (6/2014) and ablation (9/2014)     On PTA ASA 81 mg daily, atorvastatin 20 mg daily, metoprolol 12.5 mg BID     BPs variable: mostly 110-140s/50-70s  HRs 56-66 (mostly 60s), irregular today   Patient denies CP, HA, lightheadedness      PLAN:    - continue PTA ASA, statin, metoprolol  - monitor for titration needs  - f/u with Cardiology as planned      Hx of fracture of left femur s/p IM nailing (3/14/19)  F/b Dr Avalos, [facility]  Has been working with therapy since fracture but has trouble with ambulation, now s/p fall     Analgesia as above   Patient denies pain to left femur      PLAN:  - f/u with Dr Avalos as planned post TCU discharge   - Physical Therapy, Occupational Therapy for strengthening, mobility, etc.   - analgesia as above      History of Left mid femoral vein nonocclusive thrombus  Dx'd 2009; on coumadin for a period of time  Rediscovered during femur fracture hospitalization; patient didn't know that the DVT has not resolved.   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  No s/s of DVT today        PLAN:  - PTA ASA 81 gm daily remains  - monitor for leg pain, swelling, s/s of DVT.      Malignant neoplasm of transverse colon (H)  F/b: Dr Gilliam, Medical Oncology  S/p prior sigmoid colon cancer s/p sigmoid colectomy, then new transverse colon cancer s/p lap--> open transverse colectomy, lysis of adhesions 2/1/19    Exam previous visit with old scars; healed.      PLAN:  - f/u with Dr Gilliam post TCU discharge   - Colorectal Surgeon recommending colonoscopies every year because of metachronous second colon cancer in only 4 years.      Benign prostatic hyperplasia with urinary frequency  Urinary urgency  Significant difficulties with urinary frequency and urgency which patient reports has been sent his femur fracture and March, 2019.  Previously was on doxazosin which was DC'd as thought  ineffective.  Was seen by urology 4/10/19 Dr Lowe who DC'd doxazosin and started tamsulosin 0.4 mg daily.  Patient's wife reporting this has helped mildly with only having to get up 3 times a night now versus every 1.5 hours prior to medication.     5/23/19 WBC 14.07 --> 8.87 on 5/24 5/23/19 UA neg  5/27/19 UA neg      5/30/19: Tamsulosin increased to 0.8 mg   Patient today reporting he has been sleeping better - not up to void at all last night.  This likely means medication increase has been effective.     PLAN:   - (Increased) tamsulosin to 0.8 mg daily   - f/u with Urology as planned who noted possible need for UDT to eval bladder function and/or level of outlet dysfunction.    - Physical Therapy, Occupational Therapy for bladder program      Weight loss, unintentional  Body mass index is 22.87 kg/m .  Patient has had significant recent history of multiple surgeries and hospitalizations.  Was started on boost supplements by PCP  Discussion with patient's wife regarding supplements that do not have milk products    Weights stable 144-146 lbs (not done in a few weeks however)      PLAN:  - Boost Breeze (juice-based) BID between meals  - monitor weights      Anemia, unspecified type  Baseline Hgb noted to be 7-9 recently  Had surgery for colon cancer in Feb, 2019  Had surgery for left femur fracture March, 2019 s/p transfusions   Primary Care Provider noted long history of anemia (prior to surgeries)      1/2017/ 6/3/19  Ferritin 46   Folate 29.7  Iron 75 / 47  Iron binding 311 / 278  Iron Sat Index 24 / 17  Most recent Vitamin B12 - 2782     5/22/19 - hgb 9.3  5/23/19 - 8.7  5/24/19 - Hgb 8.0 (no surgery this last hospitalization)  Warfarin DC'd (remains on PTA ASA)  6/3/19: Hgb 7.8 - started on Fe Sulfate 325 mg daily     PLAN:  - (new) Fe sulfate 325 mg daily  - goal Hgb >7.0  - recheck Hgb unfortunately resulted as Hgb A1C, will reorder.      Depression  PTA mirtazapine 15 mg q HS, sertraline 150 mg q HS.    Significant health concerns with various hospitalizations recently.    Patient pleasant at today's visit, smiling.   PHQ9  In SNF - 5 (mild)      PLAN:  - consider in-house psych consult  - monitor weights, mood.         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  F/u Neurology, Dr Jase Duarte, [facility]  Wears soft brace on left foot/ankle.      PLAN:  - Physical Therapy, Occupational Therapy for strengthening, mobility, etc.      Generalized muscle weakness  Physical deconditioning  Various acute illness and surgeries with hospitalizations on chronic disease conditions.   Extensive deconditioning with weight loss and overall decline    Therapy Update:   Min A for STS transfers  Ambulating 300 ft with narrow base quad cane, Min A  supervision for toileting, dressing  TUG 44.6 sec  CPT 4.6/5.6  MoCA 17/30.      PLAN:  - Physical Therapy, Occupational Therapy for strengthening, mobility, etc.     Orders written by provider at facility  1.Change Tylenol to 1000 mg TID PRN  2. Hgb 7/9/19 Dx: anemia     Total time with patient visit: >25 minutes including discussions about the POC and care coordination with the patient and nursing. Greater than 50% of total time spent with counseling and coordinating care due to patient visit with discussion regarding analgesia, POC; coordination of care with nursing and Care Coordinator regarding therapy and discharge planning; coordination of care with therapy regarding progress, POC and dscharge planning.        Electronically signed by:  MARIA LUISA Villanueva CNP           Sincerely,        MARIA LUISA Villanueva CNP

## 2019-07-08 NOTE — PROGRESS NOTES
[facility]  [facility] Record Number:  [id number]  Place of Service where encounter took place:  [facility] ([facility]) [[id number]]  Chief Complaint   Patient presents with     RECHECK       HPI:    Noe Florence  is a 84 year old (1935), who is being seen today for an episodic care visit.  HPI information obtained from: facility chart records, facility staff, patient report and [facility] chart review. Today's concern is:     Closed fracture of distal end of right humerus with routine healing, unspecified fracture morphology, subsequent encounter  Personal history of fall  Laceration of scalp, subsequent encounter  CKD (chronic kidney disease), stage III (H)  Chronic atrial fibrillation (H)  Coronary artery disease involving native coronary artery of native heart, angina presence unspecified  S/P CABG (coronary artery bypass graft)  Ischemic cardiomyopathy  Hypertension, unspecified type  Hyperlipidemia, unspecified hyperlipidemia type  Hx of fracture of femur  History of deep venous thrombosis  Malignant neoplasm of transverse colon (H)  Benign prostatic hyperplasia with urinary frequency  Urinary urgency  Weight loss, unintentional  Anemia, unspecified type  Depression, unspecified depression type  Multifactorial gait disorder  Generalized muscle weakness  Physical deconditioning     Per Epic note/hx:  Patient is a pleasant 84 year old gentleman with PMH of HTN,HLD, CKD3, CAD s/p CABG x 2, JEREMY x 2, Vtach s/p ICD placement (5, 2012, generator change 12/2018), a fib s/p CTI (6/2014) and ablation (9/2014), sigmoid cancer s/p sigmoid colectomy, 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  His humerus fracture was reduced in the ED, Ortho was consulted and ultimately decision made to treat medically with  "splint and f/u with Ortho for likely cast.  Discussion with family and patient about OA and decision made to stop warfarin until f/u with Cardiology.  Scalp laceration was reportedly sutured with expected removal date 6/1/19 without sutures in place at today's visit.      05/29/19 patient had f/u with Orthopedics where patient was transitioned from splint into cast, continue NWB of RUE, f/u in 2 weeks.     7/3/19 Orthopedics f/u with cast removed and hinged elbow brace in place, orders for OK to begin WBAT for ADLs and OK for ambulation with walker with WB on RUE.    Patient is met today in his TCU room where he denies much pain, reporting it overall as \"very little\" and tolerable.  He denies any SOB, CP, HA, lightheadedness, upset stomach. He reports his bowels are \"iffy\" with fluctuating constipation \"here and there\" but overall are OK.      Past Medical and Surgical History reviewed in Epic today.    MEDICATIONS:  Current Outpatient Medications   Medication Sig Dispense Refill     acetaminophen (TYLENOL) 500 MG tablet Take 2 tablets (1,000 mg) by mouth 3 times daily as needed for mild pain       aspirin 81 MG tablet Take 1 tablet by mouth daily.       atorvastatin (LIPITOR) 40 MG tablet Take 0.5 tablets (20 mg) by mouth daily as directed. 90 tablet 0     calcium carbonate (TUMS) 500 MG chewable tablet Take 1 chew tab by mouth 2 times daily       cholecalciferol 1000 units TABS Take 1,000 Units by mouth daily        Ferrous Sulfate 324 (65 Fe) MG TBEC Take 324 mg by mouth daily       metoprolol tartrate (LOPRESSOR) 25 MG tablet Take 12.5 mg by mouth 2 times daily  180 tablet 3     mirtazapine (REMERON) 15 MG tablet Take 15 mg by mouth At Bedtime.       sertraline (ZOLOFT) 100 MG tablet Take 150 mg by mouth every evening        tamsulosin (FLOMAX) 0.4 MG capsule Take 2 capsules (0.8 mg) by mouth daily 30 capsule 3     REVIEW OF SYSTEMS:  Limited secondary to cognitive impairment but today pt reports 10 point ROS of " "systems including Constitutional, Eyes, Respiratory, Cardiovascular, Gastroenterology, Genitourinary, Integumentary, Musculoskeletal, Psychiatric were all negative except for pertinent positives noted in my HPI.    Objective:  /59   Pulse 66   Temp 97  F (36.1  C)   Resp 16   Ht 1.702 m (5' 7\")   Wt 66.2 kg (146 lb)   SpO2 97%   BMI 22.87 kg/m    Exam:  GENERAL APPEARANCE:  Alert, in no distress, deconditioned, thin, pleasant, confused to details   RESP:  respiratory effort and palpation of chest normal, auscultation of lungs clear, no respiratory distress, on RA, no cough   CV:  Palpation and auscultation of heart done , rate and rhythm irregular, no murmur, scant LE peripheral edema,   ABDOMEN:  normal bowel sounds, soft, nontender, no hepatosplenomegaly or other masses  M/S:   Gait and station with W/C for mobility around TCU, working with ambulation with therapy with walker, Digits and nails with arthritic changes, bilat knee old scars, reduced muscle mass, hinged elbow RUE brace in place with ext set to 0 degree, flexion to 110 degrees.   SKIN:  Inspection and Palpation of skin and subcutaneous tissue with various bruised areas, fragile skin, scalp laceration/abrasion healed.    PSYCH:  insight and judgement, memory with mild impairment, affect and mood normal, follows commands readily       Labs:   Labs done [facility] done by [facility]. Pertinent results are: reviewed    ASSESSMENT/PLAN:  Closed fracture of distal end of right humerus with routine healing, unspecified fracture morphology, subsequent encounter  Personal history of fall  S/p fall, RUE humerus fracture with reduction in ED  Ortho consulted, splinted, then casted, now with hinged right elbow brace  7/3/19 orders given for WBAT with ADLs and ambulation with walker.      Analgesia with (changed) Tylenol 1000 mg BID and daily PRN - x0 usage.     Patient reports \"very little\" pain. Discussion regarding changing " Tylenol to PRN only - patient in agreement.      PLAN:  - f/u with Ortho as planned   - OK for WB to begin ambulation with walker, etc.   - change (new) Tylenol to 1000 mg TID PRN    Laceration of scalp, subsequent encounter - resolved   Head CT neg in ED for acute pathology  Exam today with area healed   Resolved      CKD (chronic kidney disease), stage III (H)  BL Creat around 1.2  Mild ARSALAN on CKD this hospitalization  Last few BMPs:   5/22/19: BUN x, Creat 1.42, GFR 45  5/23/19: BUN 58, Creat 1.34, GFR 48  5/24/19: BUN 52, Creat 1.36, GFR 47  6/3/19: BUN 56, Creat 1.36, GFR 47     PLAN:  - Will avoid nephrotoxic agents (such as ACEI/ARB/NSAIDs, IV contrast) and mindful prescribing with renal dosing.        Chronic atrial fibrillation (H)  S/P ablation of atrial flutter  F/B: Cardiology, ROCIO Manley and Jaye Alexander CNP  S/p CTI (6/2014), right atrial appendage atrial tach ablation (9/2014), ICD placement (5/2012 - [employer] dual lead ICD)      RvB of OA discussed this hosp after fall and scalp laceration and decision to hold warfarin at this time until f/u with Cardiology  Remains on PTA ASA 81 mg daily     On PTA Metoprolol 12.5 mg BID for rate control  HRs 60s mostly, irregular today      PLAN:  - PTA warfarin on hold until Cardiology f/u (can discuss RvB with patient's wife and if wish for restart, can do this. Patient's wife not present today)  - PTA ASA 81 mg daily remains  - PTA metoprolol 12.5 mg BID remains  - monitor for rate control.   - f/u with device check as scheduled.      Coronary artery disease involving native coronary artery of native heart, angina presence unspecified  S/P CABG (coronary artery bypass graft) x 2  Ischemic cardiomyopathy  Hypertension, unspecified type  Hyperlipidemia, unspecified hyperlipidemia type  F/B: Cardiology, ROCIO Manley and Jaye Alexander CNP  s/p CABG x 2, JEREMY x 2, Vtach s/p ICD placement (5, 2012, generator change 12/2018), a fib s/p CTI (6/2014) and  ablation (9/2014)     On PTA ASA 81 mg daily, atorvastatin 20 mg daily, metoprolol 12.5 mg BID     BPs variable: mostly 110-140s/50-70s  HRs 56-66 (mostly 60s), irregular today   Patient denies CP, HA, lightheadedness      PLAN:    - continue PTA ASA, statin, metoprolol  - monitor for titration needs  - f/u with Cardiology as planned      Hx of fracture of left femur s/p IM nailing (3/14/19)  F/b Dr Avalos, [facility]  Has been working with therapy since fracture but has trouble with ambulation, now s/p fall     Analgesia as above   Patient denies pain to left femur      PLAN:  - f/u with Dr Avalos as planned post TCU discharge   - Physical Therapy, Occupational Therapy for strengthening, mobility, etc.   - analgesia as above      History of Left mid femoral vein nonocclusive thrombus  Dx'd 2009; on coumadin for a period of time  Rediscovered during femur fracture hospitalization; patient didn't know that the DVT has not resolved.   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  No s/s of DVT today        PLAN:  - PTA ASA 81 gm daily remains  - monitor for leg pain, swelling, s/s of DVT.      Malignant neoplasm of transverse colon (H)  F/b: Dr Gilliam, Medical Oncology  S/p prior sigmoid colon cancer s/p sigmoid colectomy, then new transverse colon cancer s/p lap--> open transverse colectomy, lysis of adhesions 2/1/19    Exam previous visit with old scars; healed.      PLAN:  - f/u with Dr Gilliam post TCU discharge   - Colorectal Surgeon recommending colonoscopies every year because of metachronous second colon cancer in only 4 years.      Benign prostatic hyperplasia with urinary frequency  Urinary urgency  Significant difficulties with urinary frequency and urgency which patient reports has been sent his femur fracture and March, 2019.  Previously was on doxazosin which was DC'd as thought ineffective.  Was seen by urology 4/10/19 Dr Lowe who DC'd doxazosin and started tamsulosin 0.4  mg daily.  Patient's wife reporting this has helped mildly with only having to get up 3 times a night now versus every 1.5 hours prior to medication.     5/23/19 WBC 14.07 --> 8.87 on 5/24 5/23/19 UA neg  5/27/19 UA neg      5/30/19: Tamsulosin increased to 0.8 mg   Patient today reporting he has been sleeping better - not up to void at all last night.  This likely means medication increase has been effective.     PLAN:   - (Increased) tamsulosin to 0.8 mg daily   - f/u with Urology as planned who noted possible need for UDT to eval bladder function and/or level of outlet dysfunction.    - Physical Therapy, Occupational Therapy for bladder program      Weight loss, unintentional  Body mass index is 22.87 kg/m .  Patient has had significant recent history of multiple surgeries and hospitalizations.  Was started on boost supplements by PCP  Discussion with patient's wife regarding supplements that do not have milk products    Weights stable 144-146 lbs (not done in a few weeks however)      PLAN:  - Boost Breeze (juice-based) BID between meals  - monitor weights      Anemia, unspecified type  Baseline Hgb noted to be 7-9 recently  Had surgery for colon cancer in Feb, 2019  Had surgery for left femur fracture March, 2019 s/p transfusions   Primary Care Provider noted long history of anemia (prior to surgeries)      1/2017/ 6/3/19  Ferritin 46   Folate 29.7  Iron 75 / 47  Iron binding 311 / 278  Iron Sat Index 24 / 17  Most recent Vitamin B12 - 2782     5/22/19 - hgb 9.3  5/23/19 - 8.7  5/24/19 - Hgb 8.0 (no surgery this last hospitalization)  Warfarin DC'd (remains on PTA ASA)  6/3/19: Hgb 7.8 - started on Fe Sulfate 325 mg daily     PLAN:  - (new) Fe sulfate 325 mg daily  - goal Hgb >7.0  - recheck Hgb unfortunately resulted as Hgb A1C, will reorder.      Depression  PTA mirtazapine 15 mg q HS, sertraline 150 mg q HS.   Significant health concerns with various hospitalizations recently.    Patient pleasant at today's  visit, smiling.   PHQ9  In SNF - 5 (mild)      PLAN:  - consider in-house psych consult  - monitor weights, mood.         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  F/u Neurology, Dr Jase Duarte, [facility]  Wears soft brace on left foot/ankle.      PLAN:  - Physical Therapy, Occupational Therapy for strengthening, mobility, etc.      Generalized muscle weakness  Physical deconditioning  Various acute illness and surgeries with hospitalizations on chronic disease conditions.   Extensive deconditioning with weight loss and overall decline    Therapy Update:   Min A for STS transfers  Ambulating 300 ft with narrow base quad cane, Min A  supervision for toileting, dressing  TUG 44.6 sec  CPT 4.6/5.6  MoCA 17/30.      PLAN:  - Physical Therapy, Occupational Therapy for strengthening, mobility, etc.     Orders written by provider at facility  1.Change Tylenol to 1000 mg TID PRN  2. Hgb 7/9/19 Dx: anemia     Total time with patient visit: >25 minutes including discussions about the POC and care coordination with the patient and nursing. Greater than 50% of total time spent with counseling and coordinating care due to patient visit with discussion regarding analgesia, POC; coordination of care with nursing and Care Coordinator regarding therapy and discharge planning; coordination of care with therapy regarding progress, POC and dscharge planning.        Electronically signed by:  MARIA LUISA Villanueva CNP

## 2019-07-14 NOTE — PROGRESS NOTES
[facility] DISCHARGE SUMMARY  PATIENT'S NAME: Noe Florence  YOB: 1935  MEDICAL RECORD NUMBER:  [id number]  Place of Service where encounter took [id number]    PRIMARY CARE PROVIDER AND CLINIC RESPONSIBLE AFTER TRANSFER:   Roberto Sarmiento MD, [address] [facility] [address]    [facility] [place]     Transferring providers: MARIA LUISA Villanueva CNP, Renay Srinivasan MD  Recent Hospitalization/ED:  [place]  [facility] [place].  Date of SNF Admission: May / 25 / 2019  Date of SNF (anticipated) Discharge: July / 18 / 2019  Discharged to: previous independent home and with family and hired staff  Cognitive Scores: CPT: 4.6/5.6 and MOCA: 17/30  Physical Function: CGA for transfers, ambulating 340 ft with 4WW with CGA, supervision for toileting, Min A for dressing, TUG 44.6 sec  DME: Walker    CODE STATUS/ADVANCE DIRECTIVES DISCUSSION:  Full Code   ALLERGIES: Latex     Per Epic note/hx:  Patient is a pleasant 84 year old gentleman with PMH of HTN,HLD, CKD3, CAD s/p CABG x 2, JEREMY x 2, Vtach s/p ICD placement (5, 2012, generator change 12/2018), a fib s/p CTI (6/2014) and ablation (9/2014), sigmoid cancer s/p sigmoid colectomy, 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  His humerus fracture was reduced in the ED, Ortho was consulted and ultimately decision made to treat medically with splint and f/u with Ortho for likely cast.  Discussion with family and patient about OA and decision made to stop warfarin until f/u with Cardiology.  Scalp laceration was reportedly sutured with expected removal date 6/1/19 without sutures in place at today's visit.       05/29/19 patient had f/u with Orthopedics where patient was transitioned from splint into cast, continue NWB of  RUE, f/u in 2 weeks.      7/3/19 Orthopedics f/u with cast removed and hinged elbow brace in place, orders for OK to begin WBAT for ADLs and OK for ambulation with walker with WB on RUE.    7/10/18 Patient was seen by Dr Naren Stanton, PSychiatry who started Abilify 2 mg daily.     DISCHARGE DIAGNOSIS/NURSING FACILITY COURSE:   Closed fracture of distal end of right humerus with routine healing, unspecified fracture morphology, subsequent encounter  Personal history of fall  S/p fall, RUE humerus fracture with reduction in ED  Ortho consulted, splinted, then casted, now with hinged right elbow brace  7/3/19 orders given for WBAT with ADLs and ambulation with walker.      Analgesia with (changed) Tylenol 1000 mg TID PRN      Patient reports no pain.      PLAN:  - f/u with Ortho as planned   - OK for WB for ambulation with walker, etc.   - changed (new) Tylenol to 1000 mg TID PRN     Laceration of scalp, subsequent encounter - resolved   Head CT neg in ED for acute pathology  Exam today with area healed   Resolved      CKD (chronic kidney disease), stage III (H)  BL Creat around 1.2  Mild ARSALAN on CKD this hospitalization  Last few BMPs:   5/22/19: BUN x, Creat 1.42, GFR 45  5/23/19: BUN 58, Creat 1.34, GFR 48  5/24/19: BUN 52, Creat 1.36, GFR 47  6/3/19: BUN 56, Creat 1.36, GFR 47     PLAN:  - Will avoid nephrotoxic agents (such as ACEI/ARB/NSAIDs, IV contrast) and mindful prescribing with renal dosing.         Chronic atrial fibrillation (H)  S/P ablation of atrial flutter  F/B: Cardiology, Dr Baird, [facility] and Jaye Alexander CNP  S/p CTI (6/2014), right atrial appendage atrial tach ablation (9/2014), ICD placement (5/2012 - [employer] dual lead ICD)      RvB of OA discussed this hosp after fall and scalp laceration and decision to hold warfarin at this time until f/u with Cardiology  Remains on PTA ASA 81 mg daily     On PTA Metoprolol 12.5 mg BID for rate control  HRs 63-74, regular today      PLAN:  - PTA warfarin DC'd.  Discussion with Cardiology to continue to hold d/t RvB  - PTA ASA 81 mg daily remains  - PTA metoprolol 12.5 mg BID remains  - f/u with device check as scheduled.      Coronary artery disease involving native coronary artery of native heart, angina presence unspecified  S/P CABG (coronary artery bypass graft) x 2  Ischemic cardiomyopathy  Hypertension, unspecified type  Hyperlipidemia, unspecified hyperlipidemia type  F/B: Cardiology, Dr Baird, [facility] and Jaye Alexander CNP  s/p CABG x 2, JEREMY x 2, Vtach s/p ICD placement (5, 2012, generator change 12/2018), a fib s/p CTI (6/2014) and ablation (9/2014)     On PTA ASA 81 mg daily, atorvastatin 20 mg daily, metoprolol 12.5 mg BID     BPs 120-140s/60-70s  HRs 63-74, regualr today   Patient denies CP, HA, lightheadedness      PLAN:    - continue PTA ASA, statin, metoprolol  - f/u with Cardiology as planned      Hx of fracture of left femur s/p IM nailing (3/14/19)  F/b Dr Avalos, [facility] Ortho  Has been working with therapy since fracture but has trouble with ambulation, now s/p fall     Analgesia as above      PLAN:  - f/u with Dr Avalos as planned post TCU discharge   - [facility], Occupational Therapy for strengthening, mobility, etc.   - analgesia as above      History of Left mid femoral vein nonocclusive thrombus  Dx'd 2009; on coumadin for a period of time  Rediscovered during femur fracture hospitalization; patient didn't know that the DVT has not resolved.   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  No s/s of DVT today        PLAN:  - PTA ASA 81 gm daily remains  - monitor for leg pain, swelling, s/s of DVT.      Malignant neoplasm of transverse colon (H)  F/b: Dr Gilliam, Medical Oncology  S/p prior sigmoid colon cancer s/p sigmoid colectomy, then new transverse colon cancer s/p lap--> open transverse colectomy, lysis of adhesions 2/1/19     Exam previous visit with old scars; healed.      PLAN:  - f/u with   [facility] post TCU discharge   - Colorectal Surgeon recommending colonoscopies every year because of metachronous second colon cancer in only 4 years.      Benign prostatic hyperplasia with urinary frequency  Urinary urgency  Significant difficulties with urinary frequency and urgency which patient reports has been sent his femur fracture and March, 2019.  Previously was on doxazosin which was DC'd as thought ineffective.  Was seen by urology 4/10/19 Dr Lowe who DC'd doxazosin and started tamsulosin 0.4 mg daily.  Patient's wife reporting this has helped mildly with only having to get up 3 times a night now versus every 1.5 hours prior to medication.     5/23/19 WBC 14.07 --> 8.87 on 5/24 5/23/19 UA neg  5/27/19 UA neg      5/30/19: Tamsulosin increased to 0.8 mg   Patient's spouse and patient report this increase of tamsulosin has helped along with bladder training by TCU staff who toilet patient at 2300, 0400.  Discussion today about bladder training during the day as well.       PLAN:   - (Increased) tamsulosin to 0.8 mg daily   - f/u with Urology as planned who noted possible need for UDT to eval bladder function and/or level of outlet dysfunction.    - [facility], Occupational Therapy for bladder program      Weight loss, unintentional  Body mass index is 22.71 kg/m .  Patient has had significant recent history of multiple surgeries and hospitalizations.  Was started on boost supplements by PCP  Discussion with patient's wife regarding supplements that do not have milk products, was started on Breeze 8 oz BID.      Weights  Stable or possible increased slightly, 145-149 lbs.      PLAN:  - Boost Breeze (juice-based) BID between meals     Anemia, unspecified type  Baseline Hgb noted to be 7-9 recently  Had surgery for colon cancer in Feb, 2019  Had surgery for left femur fracture March, 2019 s/p transfusions   Primary Care Provider noted long history of anemia (prior to surgeries)      1/2017/  6/3/19  Ferritin 46   Folate 29.7  Iron 75 / 47  Iron binding 311 / 278  Iron Sat Index 24 / 17  Most recent Vitamin B12 - 2782     5/22/19 - hgb 9.3  5/23/19 - 8.7  5/24/19 - Hgb 8.0 (no surgery this last hospitalization)  Warfarin DC'd (remains on PTA ASA)  6/3/19: Hgb 7.8 - started on Fe Sulfate 325 mg daily     PLAN:  - (new) Fe sulfate 325 mg daily  - goal Hgb >7.0  - Primary Care Provider to please order for monitoring.      Depression  PTA mirtazapine 15 mg q HS, sertraline 150 mg q HS.   Significant health concerns with various hospitalizations recently.     Patient pleasant at today's visit, smiling.   PHQ9  In SNF - 5 (mild)    7/10/19: Patient was seen by Dr Naren Stanton, [facility] who started patient on Abilify 2 mg daily for depression.  Noted patient was slightly lethargic at today's visit; updated patient's spouse that Abilify can be moved to HS if lethargy is persistent.          PLAN:  - continue (new) Abilify 2 mg daily - can change to HS if daytime lethargy continues.   - f/u with Psych as planned.       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  F/u Neurology, Dr Jase Duarte, [facility]  Wears soft brace on left foot/ankle.      PLAN:  - [facility], Occupational Therapy for strengthening, mobility, etc.      Generalized muscle weakness  Physical deconditioning  Various acute illness and surgeries with hospitalizations on chronic disease conditions.   Extensive deconditioning with weight loss and overall decline     Therapy Update:   CGA for STS transfers  Ambulating 340 ft with 4WW, CGA  Supervision for toileting  Min A for dressing  TUG 44.6 sec  CPT 4.6/5.6  MoCA 17/30.      PLAN:  - [facility], Occupational Therapy for strengthening, mobility, etc.     Past Medical History:  has a past medical history of Advanced open-angle glaucoma, Atrial fibrillation (H), CKD (chronic kidney  "disease), stage III (H) (2005), Colon cancer (H), Coronary artery disease, Diverticulitis, Hyperlipidemia, Hypertension, Ischemic cardiomyopathy, MGUS (monoclonal gammopathy of unknown significance), Nonsenile cataract, Osteoporosis, Polymorphic ventricular tachycardia (H), Polymyalgia rheumatica (H), PVD (posterior vitreous detachment), both eyes (2005), S/P ablation of atrial flutter (6/20/14), Stented coronary artery, SVT (supraventricular tachycardia) (H), Upper leg DVT (deep venous thromboembolism), chronic (H), and Weight loss, unintentional (2017).    Discharge Medications:  Current Outpatient Medications   Medication Sig Dispense Refill     acetaminophen (TYLENOL) 500 MG tablet Take 2 tablets (1,000 mg) by mouth 3 times daily as needed for mild pain       ARIPiprazole (ABILIFY) 2 MG tablet Take 2 mg by mouth daily       aspirin 81 MG tablet Take 1 tablet by mouth daily.       atorvastatin (LIPITOR) 40 MG tablet Take 0.5 tablets (20 mg) by mouth daily as directed. 90 tablet 0     calcium carbonate (TUMS) 500 MG chewable tablet Take 1 chew tab by mouth 2 times daily       cholecalciferol 1000 units TABS Take 1,000 Units by mouth daily        ferrous sulfate (FE TABS) 325 (65 Fe) MG EC tablet Take 325 mg by mouth daily       metoprolol tartrate (LOPRESSOR) 25 MG tablet Take 12.5 mg by mouth 2 times daily  180 tablet 3     mirtazapine (REMERON) 15 MG tablet Take 15 mg by mouth At Bedtime.       sertraline (ZOLOFT) 100 MG tablet Take 150 mg by mouth every evening        tamsulosin (FLOMAX) 0.4 MG capsule Take 2 capsules (0.8 mg) by mouth daily 30 capsule 3       Medication Changes/Rationale:     (new) Abilify 2 mg daily by Dr Stanton, [facility]     (new) Fe sulfate 325 mg daily for anemia    (new) Tylenol 1000 mg TID PRN for pain    (new) Calcium 500 mg BID for osteoporosis    PTA warfarin discontinue\"d by Cardiology    Increased tamsulosin to 0.8 mg daily     Controlled medications sent with patient: " "  not applicable/none     ROS:   Limited secondary to cognitive impairment but today pt reports 10 point ROS of systems including Constitutional, Eyes, Respiratory, Cardiovascular, Gastroenterology, Genitourinary, Integumentary, Musculoskeletal, Psychiatric were all negative except for pertinent positives noted in my HPI.    Physical Exam:   Vitals: /65   Pulse 59   Temp 97.8  F (36.6  C)   Resp 20   Ht 1.702 m (5' 7\")   Wt 65.8 kg (145 lb)   SpO2 97%   BMI 22.71 kg/m    BMI= Body mass index is 22.71 kg/m .  GENERAL APPEARANCE:  Slightly lethargic at today's visit but able to be awoken easily by voice, in no distress, frail, thin   RESP:  respiratory effort and palpation of chest normal, auscultation of lungs clear, no respiratory distress  CV:  Palpation and auscultation of heart done , rate and rhythm regular with occasional pause , no murmur, no LE peripheral edema  ABDOMEN:  normal bowel sounds, soft, nontender, no hepatosplenomegaly or other masses  M/S:   Gait and station with 4WW for ambulation, hinged right elbow brace in place, Digits and nails with arthritic changes, reducec muscle mass  SKIN:  Inspection and Palpation of skin and subcutaneous tissue pale, mild bruising, laceration healed, intact  PSYCH:  insight and judgement, memory with impairment, affect and mood normal, follows commands readily          SNF labs: Labs done in SNF are in [facility] EPIC. Please refer to them using EPIC/Care Everywhere.  See description above     DISCHARGE PLAN:    Follow up labs: Hgb recheck on appt with Primary Care Provider please     Medical Follow Up:      Follow up with primary care provider in 1 week    MTM referral needed and placed by this provider: No    Current [facility] scheduled appointments: none at this time    Discharge Services: Home Care:  Occupational Therapy, Physical Therapy, Registered Nurse and Home Health Aide    Discharge Instructions Verbalized to Patient at Discharge:     Weight " bearing restrictions:  Weight bearing as tolerated.     8 oz nutritional supplement (Breeze) recommended 2 times a day.       TOTAL DISCHARGE TIME:   Greater than 30 minutes  Electronically signed by:  MARIA LUISA Villanueva CNP

## 2019-07-15 ENCOUNTER — DISCHARGE SUMMARY NURSING HOME (OUTPATIENT)
Dept: GERIATRICS | Facility: CLINIC | Age: 84
End: 2019-07-15
Payer: COMMERCIAL

## 2019-07-15 VITALS
RESPIRATION RATE: 20 BRPM | OXYGEN SATURATION: 97 % | HEIGHT: 67 IN | BODY MASS INDEX: 22.76 KG/M2 | HEART RATE: 59 BPM | SYSTOLIC BLOOD PRESSURE: 120 MMHG | TEMPERATURE: 97.8 F | WEIGHT: 145 LBS | DIASTOLIC BLOOD PRESSURE: 65 MMHG

## 2019-07-15 DIAGNOSIS — Z91.81 PERSONAL HISTORY OF FALL: ICD-10-CM

## 2019-07-15 DIAGNOSIS — R26.89 MULTIFACTORIAL GAIT DISORDER: ICD-10-CM

## 2019-07-15 DIAGNOSIS — Z86.718 HISTORY OF DEEP VENOUS THROMBOSIS: ICD-10-CM

## 2019-07-15 DIAGNOSIS — I10 HYPERTENSION, UNSPECIFIED TYPE: ICD-10-CM

## 2019-07-15 DIAGNOSIS — S42.401D CLOSED FRACTURE OF DISTAL END OF RIGHT HUMERUS WITH ROUTINE HEALING, UNSPECIFIED FRACTURE MORPHOLOGY, SUBSEQUENT ENCOUNTER: Primary | ICD-10-CM

## 2019-07-15 DIAGNOSIS — Z95.1 S/P CABG (CORONARY ARTERY BYPASS GRAFT): ICD-10-CM

## 2019-07-15 DIAGNOSIS — R53.81 PHYSICAL DECONDITIONING: ICD-10-CM

## 2019-07-15 DIAGNOSIS — Z87.81 HX OF FRACTURE OF FEMUR: ICD-10-CM

## 2019-07-15 DIAGNOSIS — D64.9 ANEMIA, UNSPECIFIED TYPE: ICD-10-CM

## 2019-07-15 DIAGNOSIS — N40.1 BENIGN PROSTATIC HYPERPLASIA WITH URINARY FREQUENCY: ICD-10-CM

## 2019-07-15 DIAGNOSIS — R39.15 URINARY URGENCY: ICD-10-CM

## 2019-07-15 DIAGNOSIS — N18.30 CKD (CHRONIC KIDNEY DISEASE), STAGE III (H): ICD-10-CM

## 2019-07-15 DIAGNOSIS — R35.0 BENIGN PROSTATIC HYPERPLASIA WITH URINARY FREQUENCY: ICD-10-CM

## 2019-07-15 DIAGNOSIS — I25.5 ISCHEMIC CARDIOMYOPATHY: ICD-10-CM

## 2019-07-15 DIAGNOSIS — I48.20 CHRONIC ATRIAL FIBRILLATION (H): ICD-10-CM

## 2019-07-15 DIAGNOSIS — I25.10 CORONARY ARTERY DISEASE INVOLVING NATIVE CORONARY ARTERY OF NATIVE HEART, ANGINA PRESENCE UNSPECIFIED: ICD-10-CM

## 2019-07-15 DIAGNOSIS — E78.5 HYPERLIPIDEMIA, UNSPECIFIED HYPERLIPIDEMIA TYPE: ICD-10-CM

## 2019-07-15 DIAGNOSIS — R63.4 WEIGHT LOSS, UNINTENTIONAL: ICD-10-CM

## 2019-07-15 DIAGNOSIS — F32.A DEPRESSION, UNSPECIFIED DEPRESSION TYPE: ICD-10-CM

## 2019-07-15 DIAGNOSIS — S01.01XD LACERATION OF SCALP, SUBSEQUENT ENCOUNTER: ICD-10-CM

## 2019-07-15 DIAGNOSIS — M62.81 GENERALIZED MUSCLE WEAKNESS: ICD-10-CM

## 2019-07-15 DIAGNOSIS — C18.4 MALIGNANT NEOPLASM OF TRANSVERSE COLON (H): ICD-10-CM

## 2019-07-15 PROCEDURE — 99316 NF DSCHRG MGMT 30 MIN+: CPT | Performed by: NURSE PRACTITIONER

## 2019-07-15 RX ORDER — ARIPIPRAZOLE 2 MG/1
2 TABLET ORAL DAILY
COMMUNITY
End: 2022-12-05

## 2019-07-15 RX ORDER — FERROUS SULFATE 325(65) MG
325 TABLET, DELAYED RELEASE (ENTERIC COATED) ORAL DAILY
Status: ON HOLD | COMMUNITY
End: 2024-01-01

## 2019-07-15 ASSESSMENT — MIFFLIN-ST. JEOR: SCORE: 1306.35

## 2019-07-15 NOTE — LETTER
7/15/2019        RE: Noe Florence  [address] [facility] [address]        [facility] DISCHARGE SUMMARY  PATIENT'S NAME: Noe Florence  YOB: 1935  MEDICAL RECORD NUMBER:  [id number]  Place of Service where encounter took place:  [facility] (SNF) [[id number]]    PRIMARY CARE PROVIDER AND CLINIC RESPONSIBLE AFTER TRANSFER:   Roberto Sarmiento MD, [address] [facility] [address]    [facility] [place] Provider     Transferring providers: MARIA LUISA Villanueva CNP, Renay Srinivasan MD  Recent Hospitalization/ED:  Hospital  [facility] [place] [place] 5/22/19 to 5/25/19.  Date of SNF Admission: May / 25 / 2019  Date of SNF (anticipated) Discharge: July / 18 / 2019  Discharged to: previous independent home and with family and hired staff  Cognitive Scores: CPT: 4.6/5.6 and MOCA: 17/30  Physical Function: CGA for transfers, ambulating 340 ft with 4WW with CGA, supervision for toileting, Min A for dressing, TUG 44.6 sec  DME: Walker    CODE STATUS/ADVANCE DIRECTIVES DISCUSSION:  Full Code   ALLERGIES: Latex     Per Epic note/hx:  Patient is a pleasant 84 year old gentleman with PMH of HTN,HLD, CKD3, CAD s/p CABG x 2, JEREMY x 2, Vtach s/p ICD placement (5, 2012, generator change 12/2018), a fib s/p CTI (6/2014) and ablation (9/2014), sigmoid cancer s/p sigmoid colectomy, 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  His humerus fracture was reduced in the ED, Ortho was consulted and ultimately decision made to treat medically with splint and f/u with Ortho for likely cast.  Discussion with family and patient about OA and decision made to stop warfarin until f/u with Cardiology.  Scalp laceration was reportedly sutured with expected removal date 6/1/19 without sutures in place at today's visit.       05/29/19 patient had  f/u with Orthopedics where patient was transitioned from splint into cast, continue NWB of RUE, f/u in 2 weeks.      7/3/19 Orthopedics f/u with cast removed and hinged elbow brace in place, orders for OK to begin WBAT for ADLs and OK for ambulation with walker with WB on RUE.    7/10/18 Patient was seen by Dr Naren Stanton, PSychiatry who started Abilify 2 mg daily.     DISCHARGE DIAGNOSIS/NURSING FACILITY COURSE:   Closed fracture of distal end of right humerus with routine healing, unspecified fracture morphology, subsequent encounter  Personal history of fall  S/p fall, RUE humerus fracture with reduction in ED  Ortho consulted, splinted, then casted, now with hinged right elbow brace  7/3/19 orders given for WBAT with ADLs and ambulation with walker.      Analgesia with (changed) Tylenol 1000 mg TID PRN      Patient reports  no pain.      PLAN:  - f/u with Ortho as planned   - OK for WB for ambulation with walker, etc.   - change d (new) Tylenol to 1000 mg TID PRN     Laceration of scalp, subsequent encounter - resolved   Head CT neg in ED for acute pathology  Exam today with area healed   Resolved      CKD (chronic kidney disease), stage III (H)  BL Creat around 1.2  Mild ARSALAN on CKD this hospitalization  Last few BMPs:   5/22/19: BUN x, Creat 1.42, GFR 45  5/23/19: BUN 58, Creat 1.34, GFR 48  5/24/19: BUN 52, Creat 1.36, GFR 47  6/3/19: BUN 56, Creat 1.36, GFR 47     PLAN:  - Will avoid nephrotoxic agents (such as ACEI/ARB/NSAIDs, IV contrast) and mindful prescribing with renal dosing.         Chronic atrial fibrillation (H)  S/P ablation of atrial flutter  F/B: Cardiology, Dr Baird, [facility] and Jaye Alexander CNP  S/p CTI (6/2014), right atrial appendage atrial tach ablation (9/2014), ICD placement (5/2012 - Med[employer]onic dual lead ICD)      RvB of OA discussed this hosp after fall and scalp laceration and decision to hold warfarin at this time until f/u with Cardiology  Remains on PTA ASA 81 mg daily     On PTA  Metoprolol 12.5 mg BID for rate control  HRs  63-74, regular today      PLAN:  - PTA warfarin DC'd. Discussion with Cardiology to continue to hold d/t RvB  - PTA ASA 81 mg daily remains  - PTA metoprolol 12.5 mg BID remains  - f/u with device check as scheduled.      Coronary artery disease involving native coronary artery of native heart, angina presence unspecified  S/P CABG (coronary artery bypass graft) x 2  Ischemic cardiomyopathy  Hypertension, unspecified type  Hyperlipidemia, unspecified hyperlipidemia type  F/B: Cardiology, Dr Baird, [facility] and Jaye Alexander CNP  s/p CABG x 2, JEREMY x 2, Vtach s/p ICD placement (5, 2012, generator change 12/2018), a fib s/p CTI (6/2014) and ablation (9/2014)     On PTA ASA 81 mg daily, atorvastatin 20 mg daily, metoprolol 12.5 mg BID     BPs  120-140s/60-70s  HRs  63-74, regualr today   Patient denies CP, HA, lightheadedness      PLAN:    - continue PTA ASA, statin, metoprolol  - f/u with Cardiology as planned      Hx of fracture of left femur s/p IM nailing (3/14/19)  F/b Dr Avalos, [facility] Orth[facility]  Has been working with therapy since fracture but has trouble with ambulation, now s/p fall     Analgesia as above      PLAN:  - f/u with Dr Avalos as planned post TCU discharge   - [facility], Occupational Therapy for strengthening, mobility, etc.   - analgesia as above      History of Left mid femoral vein nonocclusive thrombus  Dx'd 2009; on coumadin for a period of time  Rediscovered during femur fracture hospitalization; patient didn't know that the DVT has not resolved.   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  No s/s of DVT today        PLAN:  - PTA ASA 81 gm daily remains  - monitor for leg pain, swelling, s/s of DVT.      Malignant neoplasm of transverse colon (H)  F/b: Dr Gilliam, Medical Oncology  S/p prior sigmoid colon cancer s/p sigmoid colectomy, then new transverse colon cancer s/p lap--> open transverse colectomy,  lysis of adhesions 2/1/19     Exam previous visit with old scars; healed.      PLAN:  - f/u with Dr Gilliam post TCU discharge   - Colorectal Surgeon recommending colonoscopies every year because of metachronous second colon cancer in only 4 years.      Benign prostatic hyperplasia with urinary frequency  Urinary urgency  Significant difficulties with urinary frequency and urgency which patient reports has been sent his femur fracture and March, 2019.  Previously was on doxazosin which was DC'd as thought ineffective.  Was seen by urology 4/10/19 Dr Lowe who DC'd doxazosin and started tamsulosin 0.4 mg daily.  Patient's wife reporting this has helped mildly with only having to get up 3 times a night now versus every 1.5 hours prior to medication.     5/23/19 WBC 14.07 --> 8.87 on 5/24 5/23/19 UA neg  5/27/19 UA neg      5/30/19: Tamsulosin increased to 0.8 mg   Patient's spouse and patient report this increase of tamsulosin has helped along with bladder training by TCU staff who toilet patient at 2300, 0400.  Discussion today about bladder training during the day as well.       PLAN:   - (Increased) tamsulosin to 0.8 mg daily   - f/u with Urology as planned who noted possible need for UDT to eval bladder function and/or level of outlet dysfunction.    - [facility], Occupational Therapy for bladder program      Weight loss, unintentional  Body mass index is 22.71 kg/m .  Patient has had significant recent history of multiple surgeries and hospitalizations.  Was started on boost supplements by PCP  Discussion with patient's wife regarding supplements that do not have milk products, was started on Breeze 8 oz BID.      Weights  Stable or possible increased slightly, 145-149 lbs.      PLAN:  - Boost Breeze (juice-based) BID between meals     Anemia, unspecified type  Baseline Hgb noted to be 7-9 recently  Had surgery for colon cancer in Feb, 2019  Had surgery for left femur fracture March, 2019 s/p  transfusions   Primary Care Provider noted long history of anemia (prior to surgeries)      1/2017/ 6/3/19  Ferritin 46   Folate 29.7  Iron 75 / 47  Iron binding 311 / 278  Iron Sat Index 24 / 17  Most recent Vitamin B12 - 2782     5/22/19 - hgb 9.3  5/23/19 - 8.7  5/24/19 - Hgb 8.0 (no surgery this last hospitalization)  Warfarin DC'd (remains on PTA ASA)  6/3/19: Hgb 7.8 - started on Fe Sulfate 325 mg daily     PLAN:  - (new) Fe sulfate 325 mg daily  - goal Hgb >7.0  - Primary Care Provider to please order for monitoring.      Depression  PTA mirtazapine 15 mg q HS, sertraline 150 mg q HS.   Significant health concerns with various hospitalizations recently.     Patient pleasant at today's visit, smiling.   PHQ9  In SNF - 5 (mild)    7/10/19: Patient was seen by Dr Naren Stanton, [facility] who started patient on Abilify 2 mg daily for depression.  Noted patient was slightly lethargic at today's visit; updated patient's spouse that Abilify can be moved to HS if lethargy is persistent.          PLAN:  - continue (new) Abilify 2 mg daily - can change to HS if daytime lethargy continues.   - f/u with Psych as planned.       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  F/u Neurology, Dr Jase Duarte, [facility]  Wears soft brace on left foot/ankle.      PLAN:  - [facility], Occupational Therapy for strengthening, mobility, etc.      Generalized muscle weakness  Physical deconditioning  Various acute illness and surgeries with hospitalizations on chronic disease conditions.   Extensive deconditioning with weight loss and overall decline     Therapy Update:   CGA for STS transfers  Ambulating 340 ft with 4WW, CGA  Supervision for toileting  Min A for dressing  TUG 44.6 sec  CPT 4.6/5.6  MoCA 17/30.      PLAN:  - [facility], Occupational Therapy for strengthening, mobility, etc.     Past Medical History:  has a  "past medical history of Advanced open-angle glaucoma, Atrial fibrillation (H), CKD (chronic kidney disease), stage III (H) (2005), Colon cancer (H), Coronary artery disease, Diverticulitis, Hyperlipidemia, Hypertension, Ischemic cardiomyopathy, MGUS (monoclonal gammopathy of unknown significance), Nonsenile cataract, Osteoporosis, Polymorphic ventricular tachycardia (H), Polymyalgia rheumatica (H), PVD (posterior vitreous detachment), both eyes (2005), S/P ablation of atrial flutter (6/20/14), Stented coronary artery, SVT (supraventricular tachycardia) (H), Upper leg DVT (deep venous thromboembolism), chronic (H), and Weight loss, unintentional (2017).    Discharge Medications:  Current Outpatient Medications   Medication Sig Dispense Refill     acetaminophen (TYLENOL) 500 MG tablet Take 2 tablets (1,000 mg) by mouth 3 times daily as needed for mild pain       ARIPiprazole (ABILIFY) 2 MG tablet Take 2 mg by mouth daily       aspirin 81 MG tablet Take 1 tablet by mouth daily.       atorvastatin (LIPITOR) 40 MG tablet Take 0.5 tablets (20 mg) by mouth daily as directed. 90 tablet 0     calcium carbonate (TUMS) 500 MG chewable tablet Take 1 chew tab by mouth 2 times daily       cholecalciferol 1000 units TABS Take 1,000 Units by mouth daily        ferrous sulfate (FE TABS) 325 (65 Fe) MG EC tablet Take 325 mg by mouth daily       metoprolol tartrate (LOPRESSOR) 25 MG tablet Take 12.5 mg by mouth 2 times daily  180 tablet 3     mirtazapine (REMERON) 15 MG tablet Take 15 mg by mouth At Bedtime.       sertraline (ZOLOFT) 100 MG tablet Take 150 mg by mouth every evening        tamsulosin (FLOMAX) 0.4 MG capsule Take 2 capsules (0.8 mg) by mouth daily 30 capsule 3       Medication Changes/Rationale:     (new) Abilify 2 mg daily by Dr Stanton, [facility]     (new) Fe sulfate 325 mg daily for anemia    (new) Tylenol 1000 mg TID PRN for pain    (new) Calcium 500 mg BID for osteoporosis    PTA warfarin discontinue\"d " "by Cardiology    Increased tamsulosin to 0.8 mg daily     Controlled medications sent with patient:   not applicable/none     ROS:   Limited secondary to cognitive impairment but today pt reports 10 point ROS of systems including Constitutional, Eyes, Respiratory, Cardiovascular, Gastroenterology, Genitourinary, Integumentary, Musculoskeletal, Psychiatric were all negative except for pertinent positives noted in my HPI.    Physical Exam:   Vitals: /65   Pulse 59   Temp 97.8  F (36.6  C)   Resp 20   Ht 1.702 m (5' 7\")   Wt 65.8 kg (145 lb)   SpO2 97%   BMI 22.71 kg/m     BMI= Body mass index is 22.71 kg/m .  GENERAL APPEARANCE:  Slightly lethargic at today's visit but able to be awoken easily by voice, in no distress, frail, thin   RESP:  respiratory effort and palpation of chest normal, auscultation of lungs clear, no respiratory distress  CV:  Palpation and auscultation of heart done , rate and rhythm regular with occasional pause , no murmur, no LE peripheral edema  ABDOMEN:  normal bowel sounds, soft, nontender, no hepatosplenomegaly or other masses  M/S:   Gait and station with 4WW for ambulation, hinged right elbow brace in place, Digits and nails with arthritic changes, reducec muscle mass  SKIN:  Inspection and Palpation of skin and subcutaneous tissue pale, mild bruising, laceration healed, intact  PSYCH:  insight and judgement, memory with impairment, affect and mood normal, follows commands readily          SNF labs: Labs done in SNF are in [facility] EPIC. Please refer to them using EPIC/Care Everywhere.  See description above     DISCHARGE PLAN:    Follow up labs: Hgb recheck on appt with Primary Care Provider please     Medical Follow Up:      Follow up with primary care provider in 1 week    MTM referral needed and placed by this provider: No    Current [facility] scheduled appointments: none at this time    Discharge Services: Home Care:  Occupational Therapy, Physical Therapy, Registered " Nurse and Home Health Aide    Discharge Instructions Verbalized to Patient at Discharge:     Weight bearing restrictions:  Weight bearing as tolerated.     8 oz nutritional supplement (Breeze) recommended 2 times a day.       TOTAL DISCHARGE TIME:   Greater than 30 minutes  Electronically signed by:  MARIA LUISA Villanueva CNP                         Sincerely,        AMRIA LUISA Villanueva CNP

## 2019-07-19 ENCOUNTER — THERAPY VISIT (OUTPATIENT)
Dept: PHYSICAL THERAPY | Facility: CLINIC | Age: 84
End: 2019-07-19
Attending: NURSE PRACTITIONER
Payer: COMMERCIAL

## 2019-07-19 DIAGNOSIS — M62.89 PELVIC FLOOR DYSFUNCTION: ICD-10-CM

## 2019-07-19 PROCEDURE — 97535 SELF CARE MNGMENT TRAINING: CPT | Mod: GP | Performed by: PHYSICAL THERAPIST

## 2019-07-19 PROCEDURE — 97112 NEUROMUSCULAR REEDUCATION: CPT | Mod: GP | Performed by: PHYSICAL THERAPIST

## 2019-07-19 PROCEDURE — 97163 PT EVAL HIGH COMPLEX 45 MIN: CPT | Mod: GP | Performed by: PHYSICAL THERAPIST

## 2019-07-19 NOTE — PROGRESS NOTES
[facility] Initial Evaluation  Subjective:  The history is provided by the patient and the spouse. No  was used.     General health as reported by patient is fair. Health conditions: cabcer, incontinence, kidney disease, depression, heart problems.  Medical allergies: latex.                           History of current episode:    Onset date/MD order: 4/10/19, date of order.    CC/Present symptoms: Pt's wife states that his urinary symptoms started when he fractured his femur on 3/12/19, with surgical repair 3/14/19. Pt then had a fall this May, which resulted in a right distal humerus fracture. Much of the patient's HPI was obtained from his significant other who reports the pt having strong urges and leaking urine due to patient not making it to the bathroom in time. Pt has been using a plastic urinal to help.   Pain rating (0-10): 0/10  Conditioning is improving/unchanging/worsening: worsening    Pelvic/Abdominal Surgeries:sigmoid cancer s/p sigmoid colectomy, transverse colon cancer s/p transverse colectomy 2/1/19  Hx of or present sexually transmitted disease: none  PMHx: HTN, HLD, CKD3, CAD s/p CABG x 2 JEREMY x2, Vtach s/p ICD placement, atrial fibrillation, Benign prostatic hyperplasia, osteoarthritis  Current occupation: retired  Current activity: requires supervision for ambulation, min A with sit<>stands, min A for ADLs  Goals: control urges, reduce incontinence      Urination:  Do you leak on the way to the bathroom or with a strong urge to void? Yes   Do you leak with cough,sneeze, jumping, running?No   Any other activities that cause leaking? Yes, sitting, standing, sleeping  Do you have triggers that make you feel you can't wait to go to the bathroom? No   Type of pad and number used per day? ~5, will change pants when changes depends  When you leak what is the amount? Varies to from small to large  How long can you delay the need to urinate? Not at all.    Frequency of daytime urination: unable to say  Frequency of nighttime urination:3x   Can you stop the flow of urine when on the toilet? Yes  Is the volume of urine passed usually: small, medium. (8sec rule= 250ml with average bladder storing 400-600ml)  Do you strain to pass urine? No  Do you have a slow or hesitant urinary stream? No  Do you have difficulty initiating the urine stream? No  Is urination painful? No  How many bladder infections have you had in last 12 months?none  Fluid intake(one glass is 8oz or one cup) 1-2 glasses/day, 0-1 caffinated glasses/day  0 alcohol glasses/day.  Pt's wife states he will drink anywhere from 3-4 glasses in any combination of soda, juice, or Boost    Bowel habits:  Frequency of bowel movements? 1-2 times a day  Consistancy of stool? Varies from soft to hard  Do you ignore the urge to defecate? No  Do you strain to pass stool? Occasionally    Pelvic Pain:  Are you sexually active? limited  Pt denies any pain with sitting.    Treatment/Education provided this session (see flow sheet for additional information):    Self Care Management/Patient education (25 min): Today's session consisted of education regarding pelvic floor muscle anatomy, normal bladder function, urge suppression techniques and/or relaxation techniques as indicated, and instruction in how to complete a bladder diary for assessment next visit. Depending on patient presentation, timed voids, double voids, and proper fluid/fiber intake discussed. Pt was instructed in the pathoanatomy of the pelvic floor utilizing pelvic model.  We discussed what pelvic floor physical therapy is, components of exam, and typical patient progression. Prior to internal PFM exam,  patient was told that they were in control of exam progression, and if at any time were uncomfortable and wished to discontinue we would.            NMR pelvic pain (25 min):  Pt education regarding contributing factors to pelvic pain and dysfunction  related to overactivity in the pelvic floor.  Extra time spent describing pelvic floor muscle exam and treatment plan/goals, with attention to potential history that may contribute to current symptoms. Discussed in detail potential physiological and behavioral components of increased urgency. Utilization of biofeedback for reference on how to activate pelvic floor muscles.                                  Objective:    Gait:  Uses 2WW, and supervision to Min A for stability/safety with ambulation, forward flexed torso    Deviations:  General Deviations:  Toe out L    Flexibility/Screens:       Lower Extremity:  Decreased left lower extremity flexibility:Hamstrings    Decreased right lower extremity flexibility:  Hamstrings                                       Pelvic Dysfunction Evaluation:        Flexibility:    Tightness present at:Hamstrings                SEMG Biofeedback:  Semg biofeedback pelvic: Poor coordination with quick contractions of PFM, demonstrates breath holding and glute activaiton with eddie PFM greater than 5 seconds. Incrased time for set up due to pt requiring assistance with mobility and dressing.      Suraface electrode placement--Perianal:  3 and 9 o clock near rectal opening  Baseline EMG PM:  1.5-2.0mV    Peak pelvic muscle contraction:  5.0mV-8.0mV    EMG interpretation to fatigue:  8-10 seconds  Position:  Supine                     General     ROS    Assessment/Plan:    Patient is a 84 year old male with pelvic complaints.    Patient has the following significant findings with corresponding treatment plan.                Diagnosis 1:  Functional and urge incontince  Decreased ROM/flexibility - manual therapy, therapeutic exercise, therapeutic activity and home program  Decreased strength - therapeutic exercise, therapeutic activities and home program  Impaired gait - gait training, assistive devices and home program  Impaired muscle performance - biofeedback, electric stimulation,  neuro re-education and home program    Therapy Evaluation Codes:   1) History comprised of:   Personal factors that impact the plan of care:      Age.    Comorbidity factors that impact the plan of care are:      Cancer, Heart problems and Osteoarthritis.     Medications impacting care: Anti-depressant.  2) Examination of Body Systems comprised of:   Body structures and functions that impact the plan of care:      Pelvis.   Activity limitations that impact the plan of care are:      Bathing, Bending, Dressing, Lifting, Sitting, Urgency, Urge incontinence and Urinary incontinence.  3) Clinical presentation characteristics are:   Unstable/Unpredictable.  4) Decision-Making    High complexity using standardized patient assessment instrument and/or measureable assessment of functional outcome.  Cumulative Therapy Evaluation is: High complexity.    Previous and current functional limitations:  (See Goal Flow Sheet for this information)    Short term and Long term goals: (See Goal Flow Sheet for this information)     Communication ability:  Patient appears to be able to clearly communicate and understand verbal and written communication and follow directions correctly.  Treatment Explanation - The following has been discussed with the patient:   RX ordered/plan of care  Anticipated outcomes  Possible risks and side effects  This patient would benefit from PT intervention to resume normal activities.   Rehab potential is good.    Frequency:  1 X week, once daily  Duration:  for 4 weeks tapering to 2 X a month over 8 weeks  Discharge Plan:  Achieve all LTG.  Independent in home treatment program.  Reach maximal therapeutic benefit.    Please refer to the daily flowsheet for treatment today, total treatment time and time spent performing 1:1 timed codes.

## 2019-07-19 NOTE — LETTER
[facility]   [address] [address]  [phone]    2019    Re: Noe Florence   :   1935  MRN:  [id number]   REFERRING PHYSICIAN:   Alona Lowe    [facility] PH[facility]ICAL [facility]    Date of Initial Evaluation: 2019  Visits:  Rxs Used: 1  Reason for Referral:  Pelvic floor dysfunction    EVALUATION SUMMARY    [facility] Initial Evaluation  Subjective:  The history is provided by the patient and the spouse. No  was used.     General health as reported by patient is fair. Health conditions: cabcer, incontinence, kidney disease, depression, heart problems.  Medical allergies: latex.                       History of current episode:  Onset date/MD order: 4/10/19, date of order.    CC/Present symptoms: Pt's wife states that his urinary symptoms started when he fractured his femur on 3/12/19, with surgical repair 3/14/19. Pt then had a fall this May, which resulted in a right distal humerus fracture. Much of the patient's HPI was obtained from his significant other who reports the pt having strong urges and leaking urine due to patient not making it to the bathroom in time. Pt has been using a plastic urinal to help.   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 Yes   Do you leak with cough,sneeze, jumping, running?No   Any other activities that cause leaking? Yes, sitting, standing, sleeping  Do you have triggers that make you feel you can't wait to go to the bathroom? No   Type of pad and number used per day? ~5, will change pants when changes depends  When you leak what is the amount? Varies to from small to large  How long can you delay the need to urinate? Not at all.   Re: Noe Florence    Frequency of daytime urination: unable to say  Frequency of nighttime urination:3x   Can you stop the flow of urine when on the toilet? Yes  Is the volume of urine passed usually: small, medium. (8sec rule= 250ml with average bladder storing 400-600ml)  Do you strain to pass urine? No  Do you have a slow or hesitant urinary stream? No  Do you have difficulty initiating the urine stream? No  Is urination painful? No  How many bladder infections have you had in last 12 months?none  Fluid intake(one glass is 8oz or one cup) 1-2 glasses/day, 0-1 caffinated glasses/day  0 alcohol glasses/day.  Pt's wife states he will drink anywhere from 3-4 glasses in any combination of soda, juice, or Boost    Bowel habits:  Frequency of bowel movements? 1-2 times a day  Consistancy of stool? Varies from soft to hard  Do you ignore the urge to defecate? No  Do you strain to pass stool? Occasionally    Pelvic Pain:  Are you sexually active? limited  Pt denies any pain with sitting.    Treatment/Education provided this session (see flow sheet for additional information):    Self Care Management/Patient education (25 min): Today's session consisted of education regarding pelvic floor muscle anatomy, normal bladder function, urge suppression techniques and/or relaxation techniques as indicated, and instruction in how to complete a bladder diary for assessment next visit. Depending on patient presentation, timed voids, double voids, and proper fluid/fiber intake discussed. Pt was instructed  in the pathoanatomy of the pelvic floor utilizing pelvic model.  We discussed what pelvic floor physical therapy is, components of exam, and typical patient progression. Prior to internal PFM exam,  patient was told that they were in control of exam progression, and if at any time were uncomfortable and wished to discontinue we would.        NMR pelvic pain (25 min):  Pt education regarding contributing factors to pelvic pain and dysfunction related to overactivity in the pelvic floor.  Extra time spent describing pelvic floor muscle exam and treatment plan/goals, with attention to potential history that may contribute to current symptoms. Discussed in detail potential physiological and behavioral components of increased urgency. Utilization of biofeedback for reference on how to activate pelvic floor muscles.      Objective:    Gait:  Uses 2WW, and supervision to Min A for stability/safety with ambulation, forward flexed torso    Deviations:  General Deviations:  Toe out L    Re: Noe Florence    Lower Extremity:  Decreased left lower extremity flexibility:Hamstrings  Decreased right lower extremity flexibility:  Hamstrings      Flexibility:    Tightness present at:Hamstrings    SEMG Biofeedback:  Semg biofeedback pelvic: Poor coordination with quick contractions of PFM, demonstrates breath holding and glute activaiton with eddie PFM greater than 5 seconds. Incrased time for set up due to pt requiring assistance with mobility and dressing.    Suraface electrode placement--Perianal:  3 and 9 o clock near rectal opening  Baseline EMG PM:  1.5-2.0mV    Peak pelvic muscle contraction:  5.0mV-8.0mV    EMG interpretation to fatigue:  8-10 seconds  Position:  Supine        Assessment/Plan:    Patient is a 84 year old male with pelvic complaints.    Patient has the following significant findings with corresponding treatment plan.                Diagnosis 1:  Functional and urge incontince  Decreased ROM/flexibility  - manual therapy, therapeutic exercise, therapeutic activity and home program  Decreased strength - therapeutic exercise, therapeutic activities and home program  Impaired gait - gait training, assistive devices and home program  Impaired muscle performance - biofeedback, electric stimulation, neuro re-education and home program    Therapy Evaluation Codes:   1) History comprised of:   Personal factors that impact the plan of care:      Age.    Comorbidity factors that impact the plan of care are:      Cancer, Heart problems and Osteoarthritis.     Medications impacting care: Anti-depressant.  2) Examination of Body Systems comprised of:   Body structures and functions that impact the plan of care:      Pelvis.   Activity limitations that impact the plan of care are:      Bathing, Bending, Dressing, Lifting, Sitting, Urgency, Urge incontinence and Urinary incontinence.  3) Clinical presentation characteristics are:   Unstable/Unpredictable.  4) Decision-Making    High complexity using standardized patient assessment instrument and/or measureable assessment of functional outcome.  Cumulative Therapy Evaluation is: High complexity.    Previous and current functional limitations:  (See Goal Flow Sheet for this information)    Short term and Long term goals: (See Goal Flow Sheet for this information)     Communication ability:  Patient appears to be able to clearly communicate and understand verbal and written communication and follow directions correctly.  Treatment Explanation - The following has been discussed with the patient:   RX ordered/plan of care  Anticipated outcomes  Possible risks and side effects  This patient would benefit from PT intervention to resume normal activities.   Rehab potential is good.    Frequency:  1 X week, once daily  Duration:  for 4 weeks tapering to 2 X a month over 8 weeks  Discharge Plan:  Achieve all LTG.  Independent in home treatment program.  Reach maximal therapeutic  benefit.    Please refer to the daily flowsheet for treatment today, total treatment time and time spent performing 1:1 timed codes.     Thank you for your referral.    INQUIRIES  Therapist: Bell Bennett DPT  [facility] PHYSICAL THERAPY  [address] [place] [id number]  Phone: [phone]  Fax: [phone]

## 2019-07-23 ENCOUNTER — OFFICE VISIT (OUTPATIENT)
Dept: INTERNAL MEDICINE | Facility: CLINIC | Age: 84
End: 2019-07-23
Payer: COMMERCIAL

## 2019-07-23 VITALS
RESPIRATION RATE: 16 BRPM | WEIGHT: 152.1 LBS | BODY MASS INDEX: 23.82 KG/M2 | SYSTOLIC BLOOD PRESSURE: 123 MMHG | HEART RATE: 77 BPM | DIASTOLIC BLOOD PRESSURE: 64 MMHG

## 2019-07-23 DIAGNOSIS — R29.6 FALLS FREQUENTLY: ICD-10-CM

## 2019-07-23 DIAGNOSIS — M85.852 OTHER SPECIFIED DISORDERS OF BONE DENSITY AND STRUCTURE, LEFT THIGH: ICD-10-CM

## 2019-07-23 DIAGNOSIS — S72.25XS CLOSED NONDISPLACED SUBTROCHANTERIC FRACTURE OF LEFT FEMUR, SEQUELA: Primary | ICD-10-CM

## 2019-07-23 ASSESSMENT — PAIN SCALES - GENERAL: PAINLEVEL: NO PAIN (0)

## 2019-07-23 NOTE — NURSING NOTE
Chief Complaint   Patient presents with     Recheck Medication     pt is here for a medication follow up        Cheri Man CMA at 10:07 AM on 7/23/2019

## 2019-07-23 NOTE — PATIENT INSTRUCTIONS
Primary Care Center Phone Number [phone]  Primary Care Center Medication Refill Request Information:  * Please contact your pharmacy regarding ANY request for medication refills.  ** [facility] Prescription Fax = [phone]  * Please allow 3 business days for routine medication refills.  * Please allow 5 business days for controlled substance medication refills.     Primary Care Center Test Result notification information:  *You will be notified with in 7-10 days of your appointment day regarding the results of your test.  If you are on MyChart you will be notified as soon as the provider has reviewed the results and signed off on them.               [facility]         Internal Medicine Resident                   Continuity Clinic    Who We Are    Resident Continuity Clinic is a part of the [facility] Primary Care Clinic.  Resident physicians see patients independently and establish a relationship with them over the course of their three-year residency program.  As with the Primary Care Clinic, our Resident Continuity Clinic models a group practice.  If your doctor is not available, you will be able to see another resident physician.  At the end of a resident s training, patients will be transitioned to a new resident physician for ongoing care.     We treat patients with a wide array of medical needs from routine physicals, to acute illnesses, to diabetes and blood pressure management, to complex medical illness.  What is a Resident Physician?    Resident physicians hold medical degrees and are doctors. They are training to become specialists in Internal Medicine. They work under the supervision of board-certified faculty physicians.  Expectations for Your Care    We strive to provide accessible, quality care at all times.    In order to provide this care, it is best to see your primary care resident doctor consistently rather switching between providers.  In the event you do see another physician, you  should schedule a follow-up visit with your usual primary care doctor.    If you are transitioning your care from another clinic, it is helpful to have your records available for your doctor to review.    We do not prescribe controlled substances, such as ADD medications or narcotic pain medications, on your first visit.  We will review your health records and concerns prior to devising a treatment plan with you in order to provide the best care.      Clinic Services     Extended clinic hours; patient  to help navigate your visit;  parking; laboratory and imaging services with evening and weekend hours    Multiple medical and surgical specialties in one building    Complementary services, including Nutrition, Integrative Medicine, Pharmacy consultations, Mental and Behavioral Health, Sports Medicine and Physical Therapy    Thank You    We would like to thank you for choosing the [facility] Resident Continuity Clinic for your primary care. You are making a priceless contribution to the training of the next generation of health care practitioners.     Contact us at [phone] for appointments or questions.    Resident Clinic Hours are Tuesdays and Thursdays, 7:30am-5:00pm    Residents   Alfredo Rutledge MD  (Male)   Danya Up MD (Female)  Cheri Espinal MD   (Female)   Pauline Elizabeth MD   (Female)   Malik Finch MD  (Male)   Matt Song MD  (Male)   Sukhjinder Vaughan MD    (Female)   Dong Palomo MD  (Male)   Twan Valdez MD  (Male)    Padmini Mosley MD  (Female)   Delta Alcazar MD  (Male)   Isaac Sheldon MD  (Female)   Shane Romero MD   (Female)   Pepito Benitez MD  (Male)   Benjamin Schulte MD  (Male)   Matt Alanis MD (Male)   Rangel Moore MD  (Male)   Vinita Donaldson MD (Female)    Kiki Ventura MD (Female)   Boris Vidales MD  (Male)   Kasie Mckenzie MD    (Female)   Charlene Tinoco MD  (Female)    Supervising  Physicians   MD Dorys Rivera, MD Hay Arnett, MD Roberto Sarmiento, MD Marilin Omer, MD Angie Matt, MD Winston Burns, MD Bell Bennett MD

## 2019-07-23 NOTE — PROGRESS NOTES
PRIMARY CARE CENTER       SUBJECTIVE:  Noe Florence is a 84 year old male with a PMHxwith PMH of HTN,HLD, CKD3, CAD s/p CABG x 2, JEREMY x 2, Vtach s/p ICD placement (5, 2012, generator change 12/2018), a fib s/p CTI (6/2014) and ablation (9/2014), sigmoid cancer s/p sigmoid colectomy, transverse colon cancer s/p transverse colectomy (2/1/19), BPH and multiple recent falls including left femoral fracture (s/p IM nailing) and right humeral fracture who presents today after recent discharge from TCU facility he was there for 2 months following hospitalization for fall and right humeral fracture which was conservatively managed with casting, this was removed and is now a brace.  His wife noted he had stayed an extra 2 weeks and to continue working with his walker and strengthening they have a bed on the first floor their house so he does not have to climb stairs.  Has had no falls since returning home wife is currently working on coordinating 2 days a week of home physical therapy,  but would like to add outpatient physical therapy in addition he otherwise has no concerns today.  Denies recent fevers, chills, cough, chest pain, shortness of breath, abdominal pain he does have intermittent constipation and diarrhea or bloody stool.  Sounds like it has been a long-standing issue, especially since his last bowel surgery.     Trouble with urinary incontinence, gets sensation that he needs to immediately void.  His dose of tamsulosin was increased at his facility, and he was referred to pelvic floor physical therapy and his first appointment was yesterday.  He does not contribute his falls need to go the bathroom.  He was recently started on iron and calcium supplements as well.     Medications and allergies reviewed by me today.     ROS:   Constitutional, neuro, ENT, endocrine, pulmonary, cardiac, gastrointestinal, genitourinary, musculoskeletal, integument and psychiatric systems are negative, except  as otherwise noted.    OBJECTIVE:    /64   Pulse 77   Resp 16   Wt 69 kg (152 lb 1.6 oz)   BMI 23.82 kg/m     Wt Readings from Last 1 Encounters:   07/23/19 69 kg (152 lb 1.6 oz)       GENERAL APPEARANCE: healthy, appears stated age, not in distress      EYES: EOMI,  PERRL, no scleral icterus      HENT: ear canals and TM's, no oropharyngeal erythema or exudate      NECK: no adenopathy, no asymmetry     RESP: lungs clear to auscultation - no rales, rhonchi or wheezes     CV: regular rates and rhythm, normal S1 S2, no murmur, click or rub     ABDOMEN:  soft, nontender, no HSM or masses and bowel sounds normal     MS: Rright elbow brace in place, no edema of the right elbow or forearm. No lower extremity edema.      SKIN: no suspicious lesions or rashes     NEURO: Mentation intact and speech normal     PSYCH: mentation appears normal. and affect normal/bright    ASSESSMENT/PLAN:  Noe was seen today for recheck medication.    Diagnoses and all orders for this visit:    Closed nondisplaced subtrochanteric fracture of left femur, sequela  Falls frequently  To falls from standing height in the past 6 months.  First was mechanical slipping on ice, the other consistent with mechanical fall as well tripped on door while going into his house. Denies dizziness or syncope.  Recently discharged from TCU, now ambulates with a walker inclusively and wife has made adjustments by putting bed on first for the house to avoid further falls.  No falls since discharge.  He is currently following with orthopedics, has a appointment in about 2-1/2 weeks.  Ideally would get ongoing physical therapy, planning for 2 days of home PT and will place referral for outpatient physical therapy.  Given multiple fractures from standing height past 6 months we will schedule for bone mineral density assessment.  He was recently started on calcium vitamin D supplement.   -     PHYSICAL THERAPY REFERRAL; Future  -     Dexa hip/pelvis/spine*;  Future  -     PHYSICAL THERAPY REFERRAL; Future    Other specified disorders of bone density and structure, left thigh   -     Dexa hip/pelvis/spine*; Future     Pt should return to clinic for f/u with me as needed.     Twan Valdez MD  Jul 23, 2019    Pt was seen and plan of care discussed with Dr. Sarmiento who agrees with the above stated assessment and plan.      Pt was seen and examined with Dr. Valdez.  I agree with his documentation as noted above.    My additional comments: None    Roberto Sarmiento

## 2019-07-24 ENCOUNTER — MEDICAL CORRESPONDENCE (OUTPATIENT)
Dept: HEALTH INFORMATION MANAGEMENT | Facility: CLINIC | Age: 84
End: 2019-07-24

## 2019-07-24 ENCOUNTER — TELEPHONE (OUTPATIENT)
Dept: INTERNAL MEDICINE | Facility: CLINIC | Age: 84
End: 2019-07-24

## 2019-07-24 NOTE — TELEPHONE ENCOUNTER
[facility] Call Center    Phone Message    May a detailed message be left on voicemail: yes    Reason for Call: Order(s): Home Care Orders: Skilled Nursing: Requesting verbal orders for One visit a week for one week.  Two visits per week for two weeks. Then one week one.  2 PRN.  For medication teaching and pain management medication.      Request for PT and OT to evaluate and treat.  Home Health Aid -one time a weeks for 4 weeks for safety with ADL.    Please leave detailed message with verbal orders.     Action Taken: Message routed to:  [facility]    Verbal order given and documented. Saray Middleton LPN 7/25/2019 9:33 AM

## 2019-07-24 NOTE — TELEPHONE ENCOUNTER
Verbal orders given to Chad from [facility], per Dr. Sarmiento, for Order(s): Home Care Orders: Physical Therapy (PT): Eval and Treat, Pt fractured arm. Skilled Nursing, Eval and Treat. Saray Middleton LPN 7/24/2019 9:57 AM

## 2019-07-24 NOTE — TELEPHONE ENCOUNTER
[facility]    Phone Message    May a detailed message be left on voicemail: yes, Chad is wanting to get a call back at her confidential line at [phone], today, 7/24/2019    Reason for Call: Order(s): Home Care Orders: Physical Therapy (PT): Eval and Treat, Pt fractured arm. Skilled Nursing, Eval and Treat    Action Taken: Message routed to:  [facility]     Verbal order given and documented. Saray Middleton LPN 7/24/2019 9:56 AM

## 2019-07-25 NOTE — TELEPHONE ENCOUNTER
Verbal orders given to Brady from [facility], per Dr. Sarmiento, for Requesting verbal orders for One visit a week for one week.  Two visits per week for two weeks. Then one week one.  2 PRN.  For medication teaching and pain management medication.      Request for PT and OT to evaluate and treat.  Home Health Aid -one time a weeks for 4 weeks for safety with ADL.    Saray Middleton LPN 7/25/2019 9:34 AM

## 2019-07-26 ENCOUNTER — APPOINTMENT (OUTPATIENT)
Dept: ULTRASOUND IMAGING | Facility: CLINIC | Age: 84
End: 2019-07-26
Attending: EMERGENCY MEDICINE
Payer: COMMERCIAL

## 2019-07-26 ENCOUNTER — TELEPHONE (OUTPATIENT)
Dept: CARDIOLOGY | Facility: CLINIC | Age: 84
End: 2019-07-26

## 2019-07-26 ENCOUNTER — HOSPITAL ENCOUNTER (EMERGENCY)
Facility: CLINIC | Age: 84
Discharge: HOME | End: 2019-07-26
Attending: EMERGENCY MEDICINE | Admitting: EMERGENCY MEDICINE
Payer: COMMERCIAL

## 2019-07-26 ENCOUNTER — TELEPHONE (OUTPATIENT)
Dept: INTERNAL MEDICINE | Facility: CLINIC | Age: 84
End: 2019-07-26

## 2019-07-26 VITALS
WEIGHT: 152.5 LBS | RESPIRATION RATE: 18 BRPM | OXYGEN SATURATION: 99 % | SYSTOLIC BLOOD PRESSURE: 155 MMHG | BODY MASS INDEX: 23.93 KG/M2 | TEMPERATURE: 98.4 F | DIASTOLIC BLOOD PRESSURE: 80 MMHG | HEART RATE: 60 BPM | HEIGHT: 67 IN

## 2019-07-26 DIAGNOSIS — I82.432 ACUTE DEEP VEIN THROMBOSIS (DVT) OF POPLITEAL VEIN OF LEFT LOWER EXTREMITY (H): ICD-10-CM

## 2019-07-26 LAB
ALBUMIN SERPL-MCNC: 3.2 G/DL (ref 3.4–5)
ALP SERPL-CCNC: 142 U/L (ref 40–150)
ALT SERPL W P-5'-P-CCNC: 19 U/L (ref 0–70)
ANION GAP SERPL CALCULATED.3IONS-SCNC: 6 MMOL/L (ref 3–14)
AST SERPL W P-5'-P-CCNC: 16 U/L (ref 0–45)
BASOPHILS # BLD AUTO: 0 10E9/L (ref 0–0.2)
BASOPHILS NFR BLD AUTO: 0.5 %
BILIRUB SERPL-MCNC: 0.2 MG/DL (ref 0.2–1.3)
BUN SERPL-MCNC: 57 MG/DL (ref 7–30)
CALCIUM SERPL-MCNC: 8.8 MG/DL (ref 8.5–10.1)
CHLORIDE SERPL-SCNC: 113 MMOL/L (ref 94–109)
CO2 SERPL-SCNC: 24 MMOL/L (ref 20–32)
CREAT SERPL-MCNC: 1.44 MG/DL (ref 0.66–1.25)
DIFFERENTIAL METHOD BLD: ABNORMAL
EOSINOPHIL # BLD AUTO: 0.4 10E9/L (ref 0–0.7)
EOSINOPHIL NFR BLD AUTO: 6.2 %
ERYTHROCYTE [DISTWIDTH] IN BLOOD BY AUTOMATED COUNT: 14.6 % (ref 10–15)
ERYTHROCYTE [SEDIMENTATION RATE] IN BLOOD BY WESTERGREN METHOD: 43 MM/H (ref 0–20)
GFR SERPL CREATININE-BSD FRML MDRD: 44 ML/MIN/{1.73_M2}
GLUCOSE SERPL-MCNC: 105 MG/DL (ref 70–99)
HCT VFR BLD AUTO: 29.7 % (ref 40–53)
HGB BLD-MCNC: 8.9 G/DL (ref 13.3–17.7)
IMM GRANULOCYTES # BLD: 0 10E9/L (ref 0–0.4)
IMM GRANULOCYTES NFR BLD: 0.5 %
INR PPP: 1.17 (ref 0.86–1.14)
LYMPHOCYTES # BLD AUTO: 0.8 10E9/L (ref 0.8–5.3)
LYMPHOCYTES NFR BLD AUTO: 12.3 %
MCH RBC QN AUTO: 29.4 PG (ref 26.5–33)
MCHC RBC AUTO-ENTMCNC: 30 G/DL (ref 31.5–36.5)
MCV RBC AUTO: 98 FL (ref 78–100)
MONOCYTES # BLD AUTO: 0.7 10E9/L (ref 0–1.3)
MONOCYTES NFR BLD AUTO: 11.1 %
NEUTROPHILS # BLD AUTO: 4.6 10E9/L (ref 1.6–8.3)
NEUTROPHILS NFR BLD AUTO: 69.4 %
NRBC # BLD AUTO: 0 10*3/UL
NRBC BLD AUTO-RTO: 0 /100
PLATELET # BLD AUTO: 161 10E9/L (ref 150–450)
POTASSIUM SERPL-SCNC: 4.2 MMOL/L (ref 3.4–5.3)
PROT SERPL-MCNC: 6.8 G/DL (ref 6.8–8.8)
RBC # BLD AUTO: 3.03 10E12/L (ref 4.4–5.9)
SODIUM SERPL-SCNC: 142 MMOL/L (ref 133–144)
WBC # BLD AUTO: 6.6 10E9/L (ref 4–11)

## 2019-07-26 PROCEDURE — 99284 EMERGENCY DEPT VISIT MOD MDM: CPT | Mod: 25

## 2019-07-26 PROCEDURE — 93970 EXTREMITY STUDY: CPT

## 2019-07-26 PROCEDURE — 25000128 H RX IP 250 OP 636: Performed by: EMERGENCY MEDICINE

## 2019-07-26 PROCEDURE — 96372 THER/PROPH/DIAG INJ SC/IM: CPT

## 2019-07-26 PROCEDURE — 85652 RBC SED RATE AUTOMATED: CPT | Performed by: EMERGENCY MEDICINE

## 2019-07-26 PROCEDURE — 80053 COMPREHEN METABOLIC PANEL: CPT | Performed by: EMERGENCY MEDICINE

## 2019-07-26 PROCEDURE — 85025 COMPLETE CBC W/AUTO DIFF WBC: CPT | Performed by: EMERGENCY MEDICINE

## 2019-07-26 PROCEDURE — 85610 PROTHROMBIN TIME: CPT | Performed by: EMERGENCY MEDICINE

## 2019-07-26 PROCEDURE — 99284 EMERGENCY DEPT VISIT MOD MDM: CPT | Mod: Z6 | Performed by: EMERGENCY MEDICINE

## 2019-07-26 RX ORDER — WARFARIN SODIUM 5 MG/1
5 TABLET ORAL DAILY
Qty: 60 TABLET | Refills: 0 | Status: SHIPPED | OUTPATIENT
Start: 2019-07-26 | End: 2020-08-31 | Stop reason: DRUGHIGH

## 2019-07-26 RX ADMIN — ENOXAPARIN SODIUM 70 MG: 80 INJECTION SUBCUTANEOUS at 18:40

## 2019-07-26 ASSESSMENT — MIFFLIN-ST. JEOR: SCORE: 1340.37

## 2019-07-26 ASSESSMENT — ENCOUNTER SYMPTOMS
SHORTNESS OF BREATH: 0
FEVER: 0

## 2019-07-26 NOTE — TELEPHONE ENCOUNTER
Verbal orders given to Taylor from [facility], per Dr. Sarmiento, for Order(s): Home Care Orders: Physical Therapy (PT): 2 times a week for 4 weeks starting next week, gaite training and strengthening. Saray Middleton LPN 7/26/2019 12:20 PM

## 2019-07-26 NOTE — ED PROVIDER NOTES
History     Chief Complaint   Patient presents with     Leg Swelling     The history is provided by the patient and a significant other.     Noe Florence is a 84 year old male who presents to the ED with complaints of some increased swelling in his left lower extremity. The patient presents with his significant other and they state that he has had a previous blood clot in his left lower extremity years ago. The patient was maintained on Coumadin for a long time because of his history of Afib and DVT. The patient apparently was stopped on his blood thinners just in the last week or two because he had taken a couple falls. The patient in the last day and a half now has developed redness and swelling of his left lower extremity. He denies any fevers. He denies any chest pain or shortness of breath.   This part of the medical record was transcribed by Nneka Kaminski, Medical Scribe, from a dictation done by Dr. Sharp.  I have reviewed the Medications, Allergies, Past Medical and Surgical History, and Social History in the [employer] system.    Past Medical History:   Diagnosis Date     Advanced open-angle glaucoma      Atrial fibrillation (H)      CKD (chronic kidney disease), stage III (H) 2005     Colon cancer (H)     Stage II-B colon cancer     Coronary artery disease     s/p CABG x 2, JEREMY x 2     Diverticulitis      Hyperlipidemia      Hypertension      Ischemic cardiomyopathy      MGUS (monoclonal gammopathy of unknown significance)      Nonsenile cataract     BE     Osteoporosis     left hip fracture     Polymorphic ventricular tachycardia (H)      Polymyalgia rheumatica (H)      PVD (posterior vitreous detachment), both eyes 2005     S/P ablation of atrial flutter 6/20/14    CTI     Stented coronary artery      SVT (supraventricular tachycardia) (H)     PPM/AICD for NSVT     Upper leg DVT (deep venous thromboembolism), chronic (H)     Left     Weight loss, unintentional 2017    15 lb in 4 months       Past  Surgical History:   Procedure Laterality Date     C CABG, ARTERY-VEIN, FOUR  2006    LIMA-LAD, SVG-Rt PDA, SVG-OM2, SVG-Diag 1     C CABG, VEIN, SINGLE  1994    1-vessel CABG, SVG->PDRCA      CATARACT IOL, RT/LT Bilateral      COLONOSCOPY  3/13/2014    Procedure: COMBINED COLONOSCOPY, SINGLE BIOPSY/POLYPECTOMY BY BIOPSY;;  Surgeon: Mary Gerber MD;  Location:  GI     COLONOSCOPY N/A 6/22/2015    Procedure: COLONOSCOPY;  Surgeon: Marilin Newman MD;  Location: [facility]     COLONOSCOPY N/A 11/7/2018    Procedure: COMBINED COLONOSCOPY, SINGLE OR MULTIPLE BIOPSY/POLYPECTOMY BY BIOPSY;  Surgeon: Roberto Esteban MD;  Location:  GI     EP ICD GENERATOR CHANGE DUAL N/A 12/11/2018    Procedure: EP ICD Generator Change Dual;  Surgeon: Deedee Baird MD;  Location:  HEART CARDIAC CATH LAB     H ABLATION ATRIAL FLUTTER       HEART CATH DRUG ELUTING STENT PLACEMENT  4/19/2012    JEREMY x 2 to LCx     IMPLANT IMPLANTABLE CARDIOVERTER DEFIBRILLATOR  5-    ppm/aicd     KNEE SURGERY      right and left knee surgeries     LAPAROSCOPIC ASSISTED COLECTOMY Right 2/1/2019    Procedure: Laparoscopic Converted to Open Transverse Colectomy with Lysis of Adhesions;  Surgeon: Chanelle Guzmán MD;  Location:  OR     LAPAROSCOPIC ASSISTED COLECTOMY LEFT (DESCENDING)  4/8/2014    Procedure: LAPAROSCOPIC ASSISTED COLECTOMY LEFT (DESCENDING);  Hand assisted Laparoscopic Sigmoid Colectomy , Laparoscopic mobilization of spleenic flexure *Latex Allergy*Anesthesia General with Block;  Surgeon: Chanelle Guzmán MD;  Location:  OR     OPEN REDUCTION INTERNAL FIXATION RODDING INTRAMEDULLAR FEMUR FRACTURE TABLE Left 3/14/2019    Procedure: Open Reduction Internal Fixation Left Femur Intramedullar Nailing;  Surgeon: Srikanth Avalos MD;  Location:  OR     REPAIR VALVE MITRAL  2006     30-mm Medtronic Julian ring      SELECTIVE LASER TRABECULOPLASTY (SLT) OD (RIGHT EYE)  4/10, 1/12,+1/9/13     "RE     SELECTIVE LASER TRABECULOPLASTY (SLT) OS (LEFT EYE)  2012     SHOULDER SURGERY      right rotator cuff       Family History   Problem Relation Age of Onset     C.A.D. Father      Anesthesia Reaction No family hx of      Crohn's Disease No family hx of      Ulcerative Colitis No family hx of      Cancer - colorectal No family hx of      Macular Degeneration No family hx of      Cancer No family hx of         No known family hx of skin cancer     Melanoma No family hx of      Skin Cancer No family hx of        Social History     Tobacco Use     Smoking status: Former Smoker     Types: Cigarettes, Cigars     Last attempt to quit: 1968     Years since quittin.6     Smokeless tobacco: Never Used   Substance Use Topics     Alcohol use: Yes     Alcohol/week: 0.0 oz     Comment: 2-3 drinks twice weekly         Current Outpatient Medications   Medication     acetaminophen (TYLENOL) 500 MG tablet     ARIPiprazole (ABILIFY) 2 MG tablet     atorvastatin (LIPITOR) 40 MG tablet     calcium carbonate (TUMS) 500 MG chewable tablet     cholecalciferol 1000 units TABS     ferrous sulfate (FE TABS) 325 (65 Fe) MG EC tablet     metoprolol tartrate (LOPRESSOR) 25 MG tablet     mirtazapine (REMERON) 15 MG tablet     sertraline (ZOLOFT) 100 MG tablet     tamsulosin (FLOMAX) 0.4 MG capsule        Allergies   Allergen Reactions     Latex Rash     Rash       Review of Systems   Constitutional: Negative for fever.   Respiratory: Negative for shortness of breath.    Cardiovascular: Positive for leg swelling (LLE). Negative for chest pain.   All other systems reviewed and are negative.      Physical Exam   BP: 121/54  Heart Rate: 61  Temp: 98.4  F (36.9  C)  Height: 170.2 cm (5' 7\")  Weight: 69.2 kg (152 lb 8 oz)  SpO2: 97 %      Physical Exam   Constitutional: He is oriented to person, place, and time. No distress.   Elderly slightly demented but cooperative   HENT:   Head: Atraumatic.   Eyes: Pupils are equal, round, and " reactive to light. EOM are normal.   Neck: Neck supple.   Cardiovascular:   Heart tones distant   Pulmonary/Chest: No respiratory distress.   Abdominal: Soft.   Musculoskeletal: He exhibits no deformity.   Neurological: He is alert and oriented to person, place, and time.   Grossly intact and symmetric   Skin: Skin is warm.   This and swelling of the left lower extremity at the calf and below.    Right lower extremity unremarkable   Psychiatric: He has a normal mood and affect.   Nursing note and vitals reviewed.      ED Course        Procedures          Results for orders placed or performed during the hospital encounter of 07/26/19   US Lower Extremity Venous Duplex Bilateral    Impression    IMPRESSION:  1.  No evidence of deep venous thrombosis in right lower extremity.  2.  Occlusive thrombus in left popliteal vein.   CBC with platelets differential   Result Value Ref Range    WBC 6.6 4.0 - 11.0 10e9/L    RBC Count 3.03 (L) 4.4 - 5.9 10e12/L    Hemoglobin 8.9 (L) 13.3 - 17.7 g/dL    Hematocrit 29.7 (L) 40.0 - 53.0 %    MCV 98 78 - 100 fl    MCH 29.4 26.5 - 33.0 pg    MCHC 30.0 (L) 31.5 - 36.5 g/dL    RDW 14.6 10.0 - 15.0 %    Platelet Count 161 150 - 450 10e9/L    Diff Method Automated Method     % Neutrophils 69.4 %    % Lymphocytes 12.3 %    % Monocytes 11.1 %    % Eosinophils 6.2 %    % Basophils 0.5 %    % Immature Granulocytes 0.5 %    Nucleated RBCs 0 0 /100    Absolute Neutrophil 4.6 1.6 - 8.3 10e9/L    Absolute Lymphocytes 0.8 0.8 - 5.3 10e9/L    Absolute Monocytes 0.7 0.0 - 1.3 10e9/L    Absolute Eosinophils 0.4 0.0 - 0.7 10e9/L    Absolute Basophils 0.0 0.0 - 0.2 10e9/L    Abs Immature Granulocytes 0.0 0 - 0.4 10e9/L    Absolute Nucleated RBC 0.0    INR   Result Value Ref Range    INR 1.17 (H) 0.86 - 1.14   Comprehensive metabolic panel   Result Value Ref Range    Sodium 142 133 - 144 mmol/L    Potassium 4.2 3.4 - 5.3 mmol/L    Chloride 113 (H) 94 - 109 mmol/L    Carbon Dioxide 24 20 - 32 mmol/L     Anion Gap 6 3 - 14 mmol/L    Glucose 105 (H) 70 - 99 mg/dL    Urea Nitrogen 57 (H) 7 - 30 mg/dL    Creatinine 1.44 (H) 0.66 - 1.25 mg/dL    GFR Estimate 44 (L) >60 mL/min/[1.73_m2]    GFR Estimate If Black 51 (L) >60 mL/min/[1.73_m2]    Calcium 8.8 8.5 - 10.1 mg/dL    Bilirubin Total 0.2 0.2 - 1.3 mg/dL    Albumin 3.2 (L) 3.4 - 5.0 g/dL    Protein Total 6.8 6.8 - 8.8 g/dL    Alkaline Phosphatase 142 40 - 150 U/L    ALT 19 0 - 70 U/L    AST 16 0 - 45 U/L   Erythrocyte sedimentation rate auto   Result Value Ref Range    Sed Rate 43 (H) 0 - 20 mm/h     *Note: Due to a large number of results and/or encounters for the requested time period, some results have not been displayed. A complete set of results can be found in Results Review.       Labs Ordered and Resulted from Time of ED Arrival Up to the Time of Departure from the ED   CBC WITH PLATELETS DIFFERENTIAL - Abnormal; Notable for the following components:       Result Value    RBC Count 3.03 (*)     Hemoglobin 8.9 (*)     Hematocrit 29.7 (*)     MCHC 30.0 (*)     All other components within normal limits   INR - Abnormal; Notable for the following components:    INR 1.17 (*)     All other components within normal limits   COMPREHENSIVE METABOLIC PANEL - Abnormal; Notable for the following components:    Chloride 113 (*)     Glucose 105 (*)     Urea Nitrogen 57 (*)     Creatinine 1.44 (*)     GFR Estimate 44 (*)     GFR Estimate If Black 51 (*)     Albumin 3.2 (*)     All other components within normal limits   ERYTHROCYTE SEDIMENTATION RATE AUTO - Abnormal; Notable for the following components:    Sed Rate 43 (*)     All other components within normal limits   PERIPHERAL IV CATHETER            Assessments & Plan (with Medical Decision Making)     I have reviewed the nursing notes.    Case was discussed with cardiology.    I have reviewed the findings, diagnosis, plan and need for follow up with the patient.  Nurses to instruct on Lovenox injections        Medication List      Started    enoxaparin 80 MG/0.8ML syringe  Commonly known as:  LOVENOX  70 mg, Subcutaneous, EVERY 12 HOURS     warfarin 5 MG tablet  Commonly known as:  COUMADIN  Take as directed. If you are unsure how to take this medication, talk to your nurse or doctor.  Original instructions:  5 mg, Oral, DAILY, Or as directed by [facility]        Discontinued    aspirin 81 MG tablet  Commonly known as:  ASA            Final diagnoses:   Acute deep vein thrombosis (DVT) of popliteal vein of left lower extremity (H)     Nursing personnel will instruct how to do Lovenox injections twice daily.    Restart your Coumadin.    Please make an appointment to follow up with [facility]madin Clinic (phone: [phone]) in 4-5 days to have your INR checked.    Please make an appointment to follow up with Your Primary Care Provider in 1-2 weeks for recheck.    Return to the ER for chest pain, shortness of breath or worsening.    Routine discharge instructions were given for this diagnosis    Kamari Sharp MD    7/26/2019   [facility], EMERGENCY DEPARTMENT     Kamari Sharp MD  07/26/19 [id number]

## 2019-07-26 NOTE — TELEPHONE ENCOUNTER
[facility] Call Center    Phone Message    May a detailed message be left on voicemail: yes    Reason for Call: Other: pt's wife called to schedule appt with Frida Desai and next available is not until November, pt's wife said pt has a clot in L leg and has some swelling and is wondering if pt should wiat that long, please call to discuss further     Action Taken: Message routed to:  [facility] Cardiology

## 2019-07-26 NOTE — ED AVS SNAPSHOT
[facility], [place], Emergency Department  [address] [place] [id number]  Phone:  [phone]                                    Noe Florence   MRN: [id number]    Department:  [facility], Emergency Department   Date of Visit:  7/26/2019           After Visit Summary Signature Page    I have received my discharge instructions, and my questions have been answered. I have discussed any challenges I see with this plan with the nurse or doctor.    ..........................................................................................................................................  Patient/Patient Representative Signature      ..........................................................................................................................................  Patient Representative Print Name and Relationship to Patient    ..................................................               ................................................  Date                                   Time    ..........................................................................................................................................  Reviewed by Signature/Title    ...................................................              ..............................................  Date                                               Time          22EPIC Rev 08/18

## 2019-07-26 NOTE — TELEPHONE ENCOUNTER
[facility] Call Center    Phone Message    May a detailed message be left on voicemail: yes    Reason for Call: Order(s): Home Care Orders: Physical Therapy (PT): 2 times a week for 4 weeks starting next week, gaite training and strengthening    Action Taken: Message routed to:  [facility] pcc     Verbal order given and documented. Saray Middleton LPN 7/26/2019 12:19 PM

## 2019-07-26 NOTE — ED TRIAGE NOTES
Pt arrived via car with left lower leg swelling and pain. Pt has a history of DVT in his left leg from years ago. Pt was taken off of his blood thinner a few weeks ago because of falls per family. On arrival vitals stable.

## 2019-07-26 NOTE — TELEPHONE ENCOUNTER
"Spoke with patients wife, Rica, by phone.  Patient is complaining of right leg pain and swelling.  Noe has a known non occlusive left femoral vein thrombus.  Per the patients home health care giver, the patient has \"significant increase in swelling in his right leg and the skin is red and shiny.\"  The care giver states that he can feel a pulse in the right foot.  Noe had a mechanical fall and has been in a TCU doing rehab.  He had been wearing compression daily while he was in the TCU.  The patient was discharged from the TCU last Thursday.  Since then he has been less likely to wear his compression hosiery.  Because of the mechanical fall the patient was discontinued from his warfarin.  Per Dr. Deedee Baird's note on 7/1/19, patient could remain off of warfarin from a paroxysmal Afib stand point.   As these new symptoms are now present the non effected leg and patient's multiple co-morbidies, it is recommended that the patient seek care in the emergency department for evaluation of new DVT.  Rica states understanding and agrees to call with any further questions or concerns.    Dr. Desai is on inpatient service and has been notified of plan.  ED has been notified and may page Dr. Desai with any questions.  "

## 2019-07-26 NOTE — DISCHARGE INSTRUCTIONS
Nursing personnel will instruct how to do Lovenox injections twice daily.    Restart your Coumadin.    Please make an appointment to follow up with Coumadin Clinic (phone: [phone]) in 4-5 days to have your INR checked.    Please make an appointment to follow up with Your Primary Care Provider in 1-2 weeks for recheck.    Return to the ER for chest pain, shortness of breath or worsening.

## 2019-07-29 ENCOUNTER — ANTICOAGULATION THERAPY VISIT (OUTPATIENT)
Dept: ANTICOAGULATION | Facility: CLINIC | Age: 84
End: 2019-07-29

## 2019-07-29 ENCOUNTER — MEDICAL CORRESPONDENCE (OUTPATIENT)
Dept: HEALTH INFORMATION MANAGEMENT | Facility: CLINIC | Age: 84
End: 2019-07-29

## 2019-07-29 ENCOUNTER — TELEPHONE (OUTPATIENT)
Dept: INTERNAL MEDICINE | Facility: CLINIC | Age: 84
End: 2019-07-29

## 2019-07-29 ENCOUNTER — TELEPHONE (OUTPATIENT)
Dept: CARDIOLOGY | Facility: CLINIC | Age: 84
End: 2019-07-29

## 2019-07-29 DIAGNOSIS — I48.20 CHRONIC ATRIAL FIBRILLATION (H): ICD-10-CM

## 2019-07-29 DIAGNOSIS — Z79.01 LONG TERM CURRENT USE OF ANTICOAGULANT THERAPY: ICD-10-CM

## 2019-07-29 DIAGNOSIS — I82.4Z9: ICD-10-CM

## 2019-07-29 DIAGNOSIS — I82.409 DVT (DEEP VENOUS THROMBOSIS) (H): Primary | ICD-10-CM

## 2019-07-29 NOTE — TELEPHONE ENCOUNTER
Date: 7/29/2019    Time of Call: 2:28 PM     Diagnosis:  Left Popliteal vein thrombus     [ TORB ] Ordering provider: Frida Desai MD  Order: INR 1.5-2.5  Left LE venous duplex  Follow up with [facility] in 2 weeks.     Order received by: Juanito Woodard RN     Follow-up/additional notes: spoke with patient's significant other, Rica, by phone.  Patient has started warfarin and lovenox.  Discussed with her that Dr. Desai recommends INR range 1.5-2.5.  Warfarin clinic will be notified.  Patient will continue lovenox until INR is therapeutic.  Patient will continue warfarin.  Patient will follow up in cardiology clinic with [facility] in 2 weeks.  Rica states understanding and agrees to call with any further questions or concerns.

## 2019-07-29 NOTE — PROGRESS NOTES
Patient restarted warfarin 5mg on 7/26 and Lovenox 70mg BID for a new DVT. Spoke with Brady DYSON and she will recheck an INR tomorrow.  Patient has a new goal range of 1.5-2.5 because of recent falls.   (Writer is not able to add 5mg to calender)

## 2019-07-29 NOTE — TELEPHONE ENCOUNTER
[facility] Call Center    Phone Message    May a detailed message be left on voicemail: yes    Reason for Call: Order(s): Home Care Orders: Occupational Therapy (OT): OT treatment - two times a week for four weeks; AND a Social Work Evaluation     Please call Annamaria from  Home Care,  # [phone]    Okay to leave vml msg on secured vml    Action Taken: Message routed to:  [facility]     Verbal order given and documented. Saray Middleton LPN 7/30/2019 8:39 AM

## 2019-07-29 NOTE — TELEPHONE ENCOUNTER
[facility] Call Center    Phone Message    May a detailed message be left on voicemail: yes    Reason for Call: Other: Hans calling because she has some questions regarding the pt had to goto as instructed by Juanito and the pt had a blood clot. The pt is on new medication Wayfran and Lovanix injections. Rica is wondering when can the pt start doing  activies again. Please call Rica back to discuss.     Action Taken: Message routed to:  [facility] Cardiology

## 2019-07-30 ENCOUNTER — ANCILLARY PROCEDURE (OUTPATIENT)
Dept: BONE DENSITY | Facility: CLINIC | Age: 84
End: 2019-07-30
Attending: INTERNAL MEDICINE
Payer: COMMERCIAL

## 2019-07-30 ENCOUNTER — ANTICOAGULATION THERAPY VISIT (OUTPATIENT)
Dept: ANTICOAGULATION | Facility: CLINIC | Age: 84
End: 2019-07-30

## 2019-07-30 DIAGNOSIS — I82.409 DEEP VEIN THROMBOSIS (DVT) (H): ICD-10-CM

## 2019-07-30 DIAGNOSIS — Z79.01 LONG TERM CURRENT USE OF ANTICOAGULANT THERAPY: ICD-10-CM

## 2019-07-30 DIAGNOSIS — I48.20 CHRONIC ATRIAL FIBRILLATION (H): ICD-10-CM

## 2019-07-30 DIAGNOSIS — M85.852 OTHER SPECIFIED DISORDERS OF BONE DENSITY AND STRUCTURE, LEFT THIGH: ICD-10-CM

## 2019-07-30 DIAGNOSIS — S72.25XS CLOSED NONDISPLACED SUBTROCHANTERIC FRACTURE OF LEFT FEMUR, SEQUELA: ICD-10-CM

## 2019-07-30 LAB — INR PPP: 1.7

## 2019-07-30 NOTE — TELEPHONE ENCOUNTER
Verbal orders given to Annamaria from Home Care, per Dr. Sarmiento, for Order(s): Home Care Orders: Occupational Therapy (OT): OT treatment - two times a week for four weeks; AND a Social Work Evaluation. Saray Middleton LPN 7/30/2019 8:40 AM

## 2019-07-30 NOTE — PROGRESS NOTES
ANTICOAGULATION FOLLOW-UP CLINIC VISIT    Patient Name:  Noe Florence  Date:  2019  Contact Type:  Telephone    SUBJECTIVE:  Patient Findings     Comments:   Per previous note pt restarted Warfarin 5mg daily on  + Lovenox 70mg BID. Per the rapid increase in INR since  instructions to discontinue Lovenox. Consulted Jeremy Ho RPH        Clinical Outcomes     Comments:   Per previous note pt restarted Warfarin 5mg daily on  + Lovenox 70mg BID. Per the rapid increase in INR since  instructions to discontinue Lovenox. Consulted Jeremy Ho RPH           OBJECTIVE    INR   Date Value Ref Range Status   2019 1.70  Final     Comment:     Home Care        ASSESSMENT / PLAN  INR assessment THER    Recheck INR In: 3 DAYS    INR Location Home[facility] INR      Anticoagulation Summary  As of 2019    INR goal:   1.5-2.5   TTR:   61.8 % (3 y)   INR used for dosin.70 (2019)   Warfarin maintenance plan:   No maintenance plan   Full warfarin instructions:   : 2.5 mg; : 5 mg; : 5 mg   Plan last modified:   Ciro Sharp RN (2019)   Next INR check:   2019   Priority:   INR   Target end date:   Indefinite    Indications    Atrial fibrillation (H) [I48.91] [I48.91]  Long-term (current) use of anticoagulants [Z79.01] [Z79.01]  Deep vein thrombosis (DVT) (H) [I82.409]             Anticoagulation Episode Summary     INR check location:       Preferred lab:       Send INR reminders to:    [facility]    Comments:   Patient contact number: [phone]   Can leave results with Rica (friend)  [facility] see's pt. 19 Restarted Warfarin due to DVT.       Anticoagulation Care Providers     Provider Role Specialty Phone number    Deedee Baird MD Responsible Cardiology [phone]            See the Encounter Report to view Anticoagulation Flowsheet and Dosing Calendar (Go to Encounters tab in chart review, and find the Anticoagulation Therapy  Visit)    Spoke with KIEL Abreu. Gave them new warfarin recommendation.  No changes in health, medication, or diet. No missed doses, no falls. No signs or symptoms of bleed or clotting.     Nguyen Zuniga RN

## 2019-07-31 ENCOUNTER — OFFICE VISIT (OUTPATIENT)
Dept: INTERNAL MEDICINE | Facility: CLINIC | Age: 84
End: 2019-07-31
Payer: COMMERCIAL

## 2019-07-31 ENCOUNTER — TELEPHONE (OUTPATIENT)
Dept: INTERNAL MEDICINE | Facility: CLINIC | Age: 84
End: 2019-07-31

## 2019-07-31 VITALS — SYSTOLIC BLOOD PRESSURE: 134 MMHG | OXYGEN SATURATION: 97 % | DIASTOLIC BLOOD PRESSURE: 69 MMHG | HEART RATE: 60 BPM

## 2019-07-31 DIAGNOSIS — M81.0 AGE RELATED OSTEOPOROSIS, UNSPECIFIED PATHOLOGICAL FRACTURE PRESENCE: Primary | ICD-10-CM

## 2019-07-31 RX ORDER — ALENDRONATE SODIUM 70 MG/1
70 TABLET ORAL
Qty: 12 TABLET | Refills: 3 | Status: SHIPPED | OUTPATIENT
Start: 2019-07-31 | End: 2021-05-03

## 2019-07-31 ASSESSMENT — PAIN SCALES - GENERAL: PAINLEVEL: NO PAIN (0)

## 2019-07-31 NOTE — NURSING NOTE
Chief Complaint   Patient presents with     Hospital F/U     Pt is here to follow up from ED.     Phylicia Lyons LPN at 4:42 PM on 7/31/2019.

## 2019-07-31 NOTE — TELEPHONE ENCOUNTER
[facility] Call [facility]    Phone Message    May a detailed message be left on voicemail: yes    Reason for Call: Order(s): Home Care Orders: Other: Social work to assessment - , personal care needs, other community resources     Action Taken: Message routed to:  [facility]    Verbal order given and documented. Saray Middleton LPN 8/1/2019 8:24 AM

## 2019-07-31 NOTE — PROGRESS NOTES
HPI  84-year-old seen today in follow-up with his wife after emergency room visit.  He was taken off the Coumadin because of his recent falls and fractures.  He presented to the ER with swelling in the left leg and foot.  Ultrasound shows evidence of a DVT.  Lovenox and subsequently Coumadin were started and he was therapeutically anticoagulated.  He has been limiting his physical activity because of the blood clot.  Otherwise he is been feeling somewhat weak.  He has had no associated chest pain or dyspnea.  We did review his recent DEXA scan which showed evidence of osteoporosis given his recent fractures we discussed the risks and benefits of starting on alendronate and he conceded to take this.  We discussed treating him for 3 to 5 years with his try and reduce his fracture incidence.  We also discussed taking it with a full glass of water and not laying down or eating for 30 minutes afterwards.  Seem to understand this.  Said some twitching pain in the left leg but otherwise is been feeling well.  Past Medical History:   Diagnosis Date     Advanced open-angle glaucoma      Atrial fibrillation (H)      CKD (chronic kidney disease), stage III (H) 2005     Colon cancer (H)     Stage II-B colon cancer     Coronary artery disease     s/p CABG x 2, JEREMY x 2     Diverticulitis      Hyperlipidemia      Hypertension      Ischemic cardiomyopathy      MGUS (monoclonal gammopathy of unknown significance)      Nonsenile cataract     BE     Osteoporosis     left hip fracture     Polymorphic ventricular tachycardia (H)      Polymyalgia rheumatica (H)      PVD (posterior vitreous detachment), both eyes 2005     S/P ablation of atrial flutter 6/20/14    CTI     Stented coronary artery      SVT (supraventricular tachycardia) (H)     PPM/AICD for NSVT     Upper leg DVT (deep venous thromboembolism), chronic (H)     Left     Weight loss, unintentional 2017    15 lb in 4 months     Past Surgical History:   Procedure Laterality Date      C CABG, ARTERY-VEIN, FOUR  2006    LIMA-LAD, SVG-Rt PDA, SVG-OM2, SVG-Diag 1     C CABG, VEIN, SINGLE  1994    1-vessel CABG, SVG->PDRCA      CATARACT IOL, RT/LT Bilateral      COLONOSCOPY  3/13/2014    Procedure: COMBINED COLONOSCOPY, SINGLE BIOPSY/POLYPECTOMY BY BIOPSY;;  Surgeon: Mary Gerber MD;  Location: [facility]     COLONOSCOPY N/A 6/22/2015    Procedure: COLONOSCOPY;  Surgeon: Marilin Newman MD;  Location: [facility]     COLONOSCOPY N/A 11/7/2018    Procedure: COMBINED COLONOSCOPY, SINGLE OR MULTIPLE BIOPSY/POLYPECTOMY BY BIOPSY;  Surgeon: Roberto Esteban MD;  Location:  GI     EP ICD GENERATOR CHANGE DUAL N/A 12/11/2018    Procedure: EP ICD Generator Change Dual;  Surgeon: Deedee Baird MD;  Location:  HEART CARDIAC CATH LAB     H ABLATION ATRIAL FLUTTER       HEART CATH DRUG ELUTING STENT PLACEMENT  4/19/2012    JEREMY x 2 to LCx     IMPLANT IMPLANTABLE CARDIOVERTER DEFIBRILLATOR  5-    ppm/aicd     KNEE SURGERY      right and left knee surgeries     LAPAROSCOPIC ASSISTED COLECTOMY Right 2/1/2019    Procedure: Laparoscopic Converted to Open Transverse Colectomy with Lysis of Adhesions;  Surgeon: Chanelle Guzmán MD;  Location:  OR     LAPAROSCOPIC ASSISTED COLECTOMY LEFT (DESCENDING)  4/8/2014    Procedure: LAPAROSCOPIC ASSISTED COLECTOMY LEFT (DESCENDING);  Hand assisted Laparoscopic Sigmoid Colectomy , Laparoscopic mobilization of spleenic flexure *Latex Allergy*Anesthesia General with Block;  Surgeon: Chanelle Guzmán MD;  [facility]     OPEN REDUCTION INTERNAL FIXATION RODDING INTRAMEDULLAR FEMUR FRACTURE TABLE Left 3/14/2019    Procedure: Open Reduction Internal Fixation Left Femur Intramedullar Nailing;  Surgeon: Srikanth Avalos MD;  Location:  OR     REPAIR VALVE MITRAL  2006     30-mm Medtronic Julian ring      SELECTIVE LASER TRABECULOPLASTY (SLT) OD (RIGHT EYE)  4/10, 1/12,+1/9/13    RE     SELECTIVE LASER TRABECULOPLASTY (SLT)  OS (LEFT EYE)  2012     SHOULDER SURGERY      right rotator cuff     Family History   Problem Relation Age of Onset     C.A.D. Father      Anesthesia Reaction No family hx of      Crohn's Disease No family hx of      Ulcerative Colitis No family hx of      Cancer - colorectal No family hx of      Macular Degeneration No family hx of      Cancer No family hx of         No known family hx of skin cancer     Melanoma No family hx of      Skin Cancer No family hx of      Social History     Socioeconomic History     Marital status:      Spouse name: None     Number of children: None     Years of education: None     Highest education level: None   Occupational History     None   Social Needs     Financial resource strain: None     Food insecurity:     Worry: None     Inability: None     Transportation needs:     Medical: None     Non-medical: None   Tobacco Use     Smoking status: Former Smoker     Types: Cigarettes, Cigars     Last attempt to quit: 1968     Years since quittin.6     Smokeless tobacco: Never Used   Substance and Sexual Activity     Alcohol use: Yes     Alcohol/week: 0.0 oz     Comment: 2-3 drinks twice weekly     Drug use: No     Sexual activity: None   Lifestyle     Physical activity:     Days per week: None     Minutes per session: None     Stress: None   Relationships     Social connections:     Talks on phone: None     Gets together: None     Attends Taoist service: None     Active member of club or organization: None     Attends meetings of clubs or organizations: None     Relationship status: None     Intimate partner violence:     Fear of current or ex partner: None     Emotionally abused: None     Physically abused: None     Forced sexual activity: None   Other Topics Concern     Parent/sibling w/ CABG, MI or angioplasty before 65F 55M? Not Asked   Social History Narrative     None       Exam:  /69   Pulse 60   SpO2 97%   The patient is alert, oriented with a clear  sensorium.   Skin shows no lesions or rashes and good turgor.   Head is normocephalic and atraumatic.    Neck shows no nodes, thyromegaly.     Lungs are clear.   Heart shows normal S1 and S2 without murmur or gallop.    Extremities show 2+ left pedal edema without cords erythema or calf belly tenderness..    ASSESSMENT  1 DVT on anticoagulation  2 osteoporosis start Fosamax  3 history of atrial fibrillation  4 monoclonal gammopathy  5 coronary artery disease asymptomatic  6 hypertension controlled  7 hyperlipidemia    Plan  We will start him on the Fosamax continue him on the Coumadin indefinitely we discussed that the risks of bleeding or significantly less than the risk of clots and stroke.  Plan have him follow-up in 3 months  Over 25 minutes spent with patient the majority in counseling and coordinating care.    This note was completed using Dragon voice recognition software.  Although reviewed after completion, some word and grammatical errors may occur.    Roberto Sarmiento MD  General Internal Medicine  [facility]  [phone]

## 2019-08-01 ENCOUNTER — MEDICAL CORRESPONDENCE (OUTPATIENT)
Dept: HEALTH INFORMATION MANAGEMENT | Facility: CLINIC | Age: 84
End: 2019-08-01

## 2019-08-01 NOTE — PROGRESS NOTES
Addendum 8/1/19 Received a call from JOSSY Strauss reporting that pt missed dose 7/30 and 7/31. Nicolette gave pt 5mg this morning. Med box is set up from [facility]. Consulted with Jeremy Ho RPH and will have pt restart Lovenox 70mg BID and have pt take another 0.5mg dose tonight to equal 7.5mg. Updated calendar with this. Pt has a new DVT and history of falls with bleed. Routing note to Dr. Frida Desai. Nguyen Zuniga, RN

## 2019-08-01 NOTE — TELEPHONE ENCOUNTER
Verbal orders given to Brady from [facility], per Dr. Sarmiento, for Order(s): Home Care Orders: Other: Social work to assessment - , personal care needs, other community resources. Saray Middleton LPN 8/1/2019 8:25 AM

## 2019-08-02 ENCOUNTER — ANTICOAGULATION THERAPY VISIT (OUTPATIENT)
Dept: ANTICOAGULATION | Facility: CLINIC | Age: 84
End: 2019-08-02

## 2019-08-02 DIAGNOSIS — I48.91 ATRIAL FIBRILLATION (H): ICD-10-CM

## 2019-08-02 DIAGNOSIS — Z79.01 LONG TERM CURRENT USE OF ANTICOAGULANT THERAPY: ICD-10-CM

## 2019-08-02 DIAGNOSIS — I82.409 DEEP VEIN THROMBOSIS (DVT) (H): ICD-10-CM

## 2019-08-02 LAB — INR PPP: 1.6

## 2019-08-02 NOTE — PROGRESS NOTES
ANTICOAGULATION FOLLOW-UP CLINIC VISIT    Patient Name:  Noe Florence  Date:  2019  Contact Type:  Telephone    SUBJECTIVE:  Patient Findings     Comments:   No Problems found. No Changes in Health, Medications, or diet. No Signs of bruising, bleeding or clotting.        Clinical Outcomes     Comments:   No Problems found. No Changes in Health, Medications, or diet. No Signs of bruising, bleeding or clotting.           OBJECTIVE    INR   Date Value Ref Range Status   2019 1.6  Final       ASSESSMENT / PLAN  INR assessment THER    Recheck INR In: 3 DAYS    INR Location Home[facility] INR      Anticoagulation Summary  As of 2019    INR goal:   1.5-2.5   TTR:   61.9 % (3 y)   INR used for dosin.6 (2019)   Warfarin maintenance plan:   No maintenance plan   Full warfarin instructions:   No maintenance plan   Plan last modified:   Ciro Sharp RN (2019)   Next INR check:      Priority:   INR   Target end date:   Indefinite    Indications    Atrial fibrillation (H) [I48.91] [I48.91]  Long-term (current) use of anticoagulants [Z79.01] [Z79.01]  Deep vein thrombosis (DVT) (H) [I82.409]             Anticoagulation Episode Summary     INR check location:       Preferred lab:       Send INR reminders to:   [facility]    Comments:   Patient contact number: [phone] Hx of Falls/Bleed  Can leave results with Rica (friend)  [facility] see[facility] pt. 19 Restarted Warfarin due to DVT.       Anticoagulation Care Providers     Provider Role Specialty Phone number    Frida Desai MD Responsible Cardiology [phone]            See the Encounter Report to view Anticoagulation Flowsheet and Dosing Calendar (Go to Encounters tab in chart review, and find the Anticoagulation Therapy Visit)    Spoke with Home care INR  BradyAgnieszka Liu only took Lovenox Injections on Thurs AM and PM    Mark Ho [facility]

## 2019-08-05 ENCOUNTER — TELEPHONE (OUTPATIENT)
Dept: INTERNAL MEDICINE | Facility: CLINIC | Age: 84
End: 2019-08-05

## 2019-08-05 ENCOUNTER — ANTICOAGULATION THERAPY VISIT (OUTPATIENT)
Dept: ANTICOAGULATION | Facility: CLINIC | Age: 84
End: 2019-08-05

## 2019-08-05 DIAGNOSIS — I82.409 DEEP VEIN THROMBOSIS (DVT) (H): ICD-10-CM

## 2019-08-05 DIAGNOSIS — Z79.01 LONG TERM CURRENT USE OF ANTICOAGULANT THERAPY: ICD-10-CM

## 2019-08-05 DIAGNOSIS — I48.91 ATRIAL FIBRILLATION (H): ICD-10-CM

## 2019-08-05 LAB — INR PPP: 1.9

## 2019-08-05 NOTE — TELEPHONE ENCOUNTER
Health Call Center    Phone Message    May a detailed message be left on voicemail: yes    Reason for Call: Order(s): Home Care Orders: Skilled Nursing: change to the home healthaide frequency due additional personal care needs  1x week for 1 week and 2x week for 2 weeks.    Action Taken: Message routed to:  [facility]      Verbal order given and documented. Saray Middleton LPN 8/5/2019 10:33 AM

## 2019-08-05 NOTE — PROGRESS NOTES
ANTICOAGULATION FOLLOW-UP CLINIC VISIT    Patient Name:  Noe Florence  Date:  2019  Contact Type:  Telephone    SUBJECTIVE:  Patient Findings     Comments:   No Problems found. No Changes in Health, Medications, or diet. No Signs of bruising, bleeding or clotting.          Clinical Outcomes     Comments:   No Problems found. No Changes in Health, Medications, or diet. No Signs of bruising, bleeding or clotting.             OBJECTIVE    INR   Date Value Ref Range Status   2019 1.9  Final       ASSESSMENT / PLAN  INR assessment THER    Recheck INR In: 3 DAYS    INR Location [facility] INR      Anticoagulation Summary  As of 2019    INR goal:   1.5-2.5   TTR:   62.0 % (3 y)   INR used for dosin.9 (2019)   Warfarin maintenance plan:   No maintenance plan   Full warfarin instructions:   No maintenance plan   Plan last modified:   Ciro Sharp RN (2019)   Next INR check:      Priority:   INR   Target end date:   Indefinite    Indications    Atrial fibrillation (H) [I48.91] [I48.91]  Long-term (current) use of anticoagulants [Z79.01] [Z79.01]  Deep vein thrombosis (DVT) (H) [I82.409]             Anticoagulation Episode Summary     INR check location:       Preferred lab:       [facility]    Comments:   Patient contact number: [phone] Hx of Falls/Bleed  Can leave results with Rica (friend)  [facility] see[facility] pt. 19 Restarted Warfarin due to DVT.       Anticoagulation Care Providers     Provider Role Specialty Phone number    Frida Desai MD [facility] Cardiology [phone]            See the Encounter Report to view Anticoagulation Flowsheet and Dosing Calendar (Go to Encounters tab in chart review, and find the Anticoagulation Therapy Visit)    Spoke with Home care BYRON Abreu. Next week she will start seeing Noe weekly.    Mark Ho [facility]

## 2019-08-05 NOTE — TELEPHONE ENCOUNTER
Verbal orders given to Brady from [facility], per Dr. Sarmiento, for change to the home healthaide frequency due additional personal care needs  1x week for 1 week and 2x week for 2 weeks. Saray Middleton LPN 8/5/2019 10:33 AM

## 2019-08-06 ENCOUNTER — MEDICAL CORRESPONDENCE (OUTPATIENT)
Dept: HEALTH INFORMATION MANAGEMENT | Facility: CLINIC | Age: 84
End: 2019-08-06

## 2019-08-08 ENCOUNTER — MEDICAL CORRESPONDENCE (OUTPATIENT)
Dept: HEALTH INFORMATION MANAGEMENT | Facility: CLINIC | Age: 84
End: 2019-08-08

## 2019-08-08 ENCOUNTER — ANTICOAGULATION THERAPY VISIT (OUTPATIENT)
Dept: ANTICOAGULATION | Facility: CLINIC | Age: 84
End: 2019-08-08

## 2019-08-08 DIAGNOSIS — I48.91 ATRIAL FIBRILLATION (H): ICD-10-CM

## 2019-08-08 DIAGNOSIS — Z79.01 LONG TERM CURRENT USE OF ANTICOAGULANT THERAPY: ICD-10-CM

## 2019-08-08 DIAGNOSIS — I82.409 DEEP VEIN THROMBOSIS (DVT) (H): ICD-10-CM

## 2019-08-08 LAB — INR PPP: 1.8

## 2019-08-08 NOTE — Clinical Note
Update from the [facility].  Patient INR is 1.8, goal range is 1.5-2.5.  New Anticoagulation protocol requires the RN to contact provider with recommendation.  I recommended 2.5mg of Coumdin Tu, Th, 5mg all other days of the week. Home care to draw in one week.  If an adjustment is needed, please call [facility], ask for Ciro [facility] RN. [phone] Thank you.

## 2019-08-08 NOTE — PROGRESS NOTES
ANTICOAGULATION FOLLOW-UP CLINIC VISIT      19 ADDENDUM  This writer did not call patient or home care.  INR result today is from the clinic. Result is therapeutic at 2.11.  Noe will be seen in the home on . Continues on 2.5mg Tu, Th, 5mg AOD.  Discussed with Pharmacist Jeremy Ho [facility] due to goal range of 1.5-2.5.  Ciro Sharp RN      Patient Name:  Noe Florence  Date:  2019  Contact Type:  Telephone    SUBJECTIVE:         OBJECTIVE    INR   Date Value Ref Range Status   2019 1.8  Final     Comment:     Home care INR       ASSESSMENT / PLAN  INR assessment THER    Recheck INR In: 1 WEEK    INR Location [facility] INR      Anticoagulation Summary  As of 2019    INR goal:   1.5-2.5   TTR:   62.1 % (3 y)   INR used for dosin.8 (2019)   Warfarin maintenance plan:   2.5 mg (5 mg x 0.5) every Tue, Thu; 5 mg (5 mg x 1) all other days   Full warfarin instructions:   2.5 mg every Tue, Thu; 5 mg all other days   Weekly warfarin total:   30 mg   Plan last modified:   Ciro Sharp RN (2019)   Next INR check:   8/15/2019   Priority:   INR   Target end date:   Indefinite    Indications    Atrial fibrillation (H) [I48.91] [I48.91]  Long-term (current) use of anticoagulants [Z79.01] [Z79.01]  Deep vein thrombosis (DVT) (H) [I82.409]             Anticoagulation Episode Summary     INR check location:       Preferred lab:       Send INR reminders to:   [facility]    Comments:   Patient contact number: [phone] Hx of Falls/Bleed  Can leave results with Rica (friend)  [facility] see[facility] pt. 19 Restarted Warfarin due to DVT.       Anticoagulation Care Providers     Provider Role Specialty Phone number    Frida Desai MD Responsible Cardiology [phone]            See the Encounter Report to view Anticoagulation Flowsheet and Dosing Calendar (Go to Encounters tab in chart review, and find the Anticoagulation Therapy Visit)    INR/CFX/F2 RESULT: INR  result is 1.8 via fingerstick per KIEL Abreu    ASSESSMENT:No Problems found. No Changes in Health, Medications, or diet. No Signs of bruising, bleeding or clotting.  Patient has diarrhea, and home care stated the team is aware.  Have altered his diet to remedy loose stools.    DOSING ADJUSTMENT: Recommended maintenance dose of 2.5mg on Tuesday, Thursday, 5mg all other days of the week.      NEXT INR/FACTOR X OR FACTOR II:1 week 8/14 at home visit.  Patient also has an appointment on 8/12 at the clinic.    PROTOCOL FOLLOWED:No, INR goal range is 1.5-2.5.  Updated Dr. Desai with recommendations.    Ciro Sharp RN

## 2019-08-09 ENCOUNTER — TELEPHONE (OUTPATIENT)
Dept: INTERNAL MEDICINE | Facility: CLINIC | Age: 84
End: 2019-08-09

## 2019-08-09 DIAGNOSIS — C18.9 MALIGNANT NEOPLASM OF COLON, UNSPECIFIED PART OF COLON (H): Primary | ICD-10-CM

## 2019-08-09 NOTE — TELEPHONE ENCOUNTER
[facility] Call Center    Phone Message    May a detailed message be left on voicemail: yes    Reason for Call: Order(s): Other:   Reason for requested: Social work revisit for [facility] resource connection  Date needed: Asap  Provider name: Dr. Sarmiento  Comments: Please provide the order start date as 8/26/19 for 1x/week for 1 week.      Action Taken: Message routed to:  [facility]     Verbal order given and documented. Saray Middleton LPN 8/12/2019 8:50 AM

## 2019-08-12 ENCOUNTER — ANCILLARY PROCEDURE (OUTPATIENT)
Dept: CT IMAGING | Facility: CLINIC | Age: 84
End: 2019-08-12
Attending: COLON & RECTAL SURGERY
Payer: COMMERCIAL

## 2019-08-12 DIAGNOSIS — I48.91 ATRIAL FIBRILLATION (H): ICD-10-CM

## 2019-08-12 DIAGNOSIS — C18.9 MALIGNANT NEOPLASM OF COLON (H): ICD-10-CM

## 2019-08-12 DIAGNOSIS — E78.5 HYPERLIPIDEMIA, UNSPECIFIED HYPERLIPIDEMIA TYPE: ICD-10-CM

## 2019-08-12 DIAGNOSIS — C18.9 MALIGNANT NEOPLASM OF COLON, UNSPECIFIED PART OF COLON (H): ICD-10-CM

## 2019-08-12 LAB
ALBUMIN SERPL-MCNC: 3.6 G/DL (ref 3.4–5)
ALP SERPL-CCNC: 128 U/L (ref 40–150)
ALT SERPL W P-5'-P-CCNC: 22 U/L (ref 0–70)
ANION GAP SERPL CALCULATED.3IONS-SCNC: 6 MMOL/L (ref 3–14)
AST SERPL W P-5'-P-CCNC: 15 U/L (ref 0–45)
BASOPHILS # BLD AUTO: 0 10E9/L (ref 0–0.2)
BASOPHILS NFR BLD AUTO: 0.5 %
BILIRUB SERPL-MCNC: 0.2 MG/DL (ref 0.2–1.3)
BUN SERPL-MCNC: 59 MG/DL (ref 7–30)
CALCIUM SERPL-MCNC: 8.9 MG/DL (ref 8.5–10.1)
CEA SERPL-MCNC: 4.5 UG/L (ref 0–2.5)
CHLORIDE SERPL-SCNC: 110 MMOL/L (ref 94–109)
CHOLEST SERPL-MCNC: 146 MG/DL
CO2 SERPL-SCNC: 25 MMOL/L (ref 20–32)
CREAT BLD-MCNC: 1.6 MG/DL (ref 0.66–1.25)
CREAT SERPL-MCNC: 1.56 MG/DL (ref 0.66–1.25)
DIFFERENTIAL METHOD BLD: ABNORMAL
EOSINOPHIL # BLD AUTO: 0.3 10E9/L (ref 0–0.7)
EOSINOPHIL NFR BLD AUTO: 3.8 %
ERYTHROCYTE [DISTWIDTH] IN BLOOD BY AUTOMATED COUNT: 14.1 % (ref 10–15)
GFR SERPL CREATININE-BSD FRML MDRD: 40 ML/MIN/{1.73_M2}
GFR SERPL CREATININE-BSD FRML MDRD: 41 ML/MIN/{1.73_M2}
GLUCOSE SERPL-MCNC: 104 MG/DL (ref 70–99)
HCT VFR BLD AUTO: 34.6 % (ref 40–53)
HDLC SERPL-MCNC: 61 MG/DL
HGB BLD-MCNC: 10.6 G/DL (ref 13.3–17.7)
IMM GRANULOCYTES # BLD: 0 10E9/L (ref 0–0.4)
IMM GRANULOCYTES NFR BLD: 0.3 %
INR PPP: 2.11 (ref 0.86–1.14)
LDLC SERPL CALC-MCNC: 55 MG/DL
LYMPHOCYTES # BLD AUTO: 0.9 10E9/L (ref 0.8–5.3)
LYMPHOCYTES NFR BLD AUTO: 11.3 %
MCH RBC QN AUTO: 29.9 PG (ref 26.5–33)
MCHC RBC AUTO-ENTMCNC: 30.6 G/DL (ref 31.5–36.5)
MCV RBC AUTO: 98 FL (ref 78–100)
MONOCYTES # BLD AUTO: 0.9 10E9/L (ref 0–1.3)
MONOCYTES NFR BLD AUTO: 11.4 %
NEUTROPHILS # BLD AUTO: 5.8 10E9/L (ref 1.6–8.3)
NEUTROPHILS NFR BLD AUTO: 72.7 %
NONHDLC SERPL-MCNC: 85 MG/DL
NRBC # BLD AUTO: 0 10*3/UL
NRBC BLD AUTO-RTO: 0 /100
PLATELET # BLD AUTO: 170 10E9/L (ref 150–450)
POTASSIUM SERPL-SCNC: 4 MMOL/L (ref 3.4–5.3)
PROT SERPL-MCNC: 7.7 G/DL (ref 6.8–8.8)
RBC # BLD AUTO: 3.55 10E12/L (ref 4.4–5.9)
SODIUM SERPL-SCNC: 141 MMOL/L (ref 133–144)
TRIGL SERPL-MCNC: 150 MG/DL
WBC # BLD AUTO: 8 10E9/L (ref 4–11)

## 2019-08-12 RX ORDER — IOPAMIDOL 755 MG/ML
93 INJECTION, SOLUTION INTRAVASCULAR ONCE
Status: COMPLETED | OUTPATIENT
Start: 2019-08-12 | End: 2019-08-12

## 2019-08-12 RX ADMIN — IOPAMIDOL 93 ML: 755 INJECTION, SOLUTION INTRAVASCULAR at 11:19

## 2019-08-12 NOTE — DISCHARGE INSTRUCTIONS

## 2019-08-12 NOTE — TELEPHONE ENCOUNTER
Verbal orders given to Tanesha from [facility], per Dr. Sarmiento, for :the order start date as 8/26/19 for 1x/week for 1 week. Saray Middleton LPN 8/12/2019 8:52 AM

## 2019-08-15 ENCOUNTER — ANTICOAGULATION THERAPY VISIT (OUTPATIENT)
Dept: ANTICOAGULATION | Facility: CLINIC | Age: 84
End: 2019-08-15

## 2019-08-15 DIAGNOSIS — I48.91 ATRIAL FIBRILLATION (H): ICD-10-CM

## 2019-08-15 DIAGNOSIS — I82.409 DEEP VEIN THROMBOSIS (DVT) (H): ICD-10-CM

## 2019-08-15 DIAGNOSIS — Z79.01 LONG TERM CURRENT USE OF ANTICOAGULANT THERAPY: ICD-10-CM

## 2019-08-15 NOTE — PROGRESS NOTES
ANTICOAGULATION FOLLOW-UP CLINIC VISIT    Patient Name:  Noe Florence  Date:  8/15/2019  Contact Type:  Telephone    SUBJECTIVE:         OBJECTIVE    INR   Date Value Ref Range Status   2019 2.11 (H) 0.86 - 1.14 Final       ASSESSMENT / PLAN  INR assessment THER    Recheck INR In: 1 WEEK    INR Location Clinic      Anticoagulation Summary  As of 8/15/2019    INR goal:   1.5-2.5   TTR:   62.3 % (3 y)   INR used for dosin.11 (2019)   Warfarin maintenance plan:   2.5 mg (5 mg x 0.5) every Tue, Thu; 5 mg (5 mg x 1) all other days   Full warfarin instructions:   2.5 mg every Tue, Thu; 5 mg all other days   Weekly warfarin total:   30 mg   No change documented:   Ciro Sharp RN   Plan last modified:   Ciro Sharp RN (2019)   Next INR check:   2019   Priority:   INR   Target end date:   Indefinite    Indications    Atrial fibrillation (H) [I48.91] [I48.91]  Long-term (current) use of anticoagulants [Z79.01] [Z79.01]  Deep vein thrombosis (DVT) (H) [I82.409]             Anticoagulation Episode Summary     INR check location:       Preferred lab:       Send INR reminders to:   [facility]    Comments:   Patient contact number: [phone] Hx of Falls/Bleed  Can leave results with Rica (friend)  [facility] see[facility] pt. 19 Restarted Warfarin due to DVT.       Anticoagulation Care Providers     Provider Role Specialty Phone number    Frida Desai MD Responsible Cardiology [phone]            See the Encounter Report to view Anticoagulation Flowsheet and Dosing Calendar (Go to Encounters tab in chart review, and find the Anticoagulation Therapy Visit)    INR/CFX/F2 RESULT:INR result per home care nurseBrady via fingerstick is 2.1    ASSESSMENT:No Problems found. No Changes in Health, Medications, or diet. No Signs of bruising, bleeding or clotting.    DOSING ADJUSTMENT: Continue current maintenance dose    NEXT INR/FACTOR X OR FACTOR II: at home  visit, End date for home care may be approaching.  Brady will be evaluating patient next week to look into discharge.    PROTOCOL FOLLOWED: Goal range is 1.5-2.5    Ciro Sharp RN

## 2019-08-19 ENCOUNTER — ONCOLOGY VISIT (OUTPATIENT)
Dept: ONCOLOGY | Facility: CLINIC | Age: 84
End: 2019-08-19
Attending: INTERNAL MEDICINE
Payer: COMMERCIAL

## 2019-08-19 VITALS
SYSTOLIC BLOOD PRESSURE: 135 MMHG | RESPIRATION RATE: 16 BRPM | BODY MASS INDEX: 23.49 KG/M2 | WEIGHT: 150 LBS | DIASTOLIC BLOOD PRESSURE: 73 MMHG | TEMPERATURE: 96.9 F | HEART RATE: 72 BPM | OXYGEN SATURATION: 98 %

## 2019-08-19 DIAGNOSIS — C18.9 MALIGNANT NEOPLASM OF COLON, UNSPECIFIED PART OF COLON (H): Primary | ICD-10-CM

## 2019-08-19 PROBLEM — R55 SYNCOPE AND COLLAPSE: Status: ACTIVE | Noted: 2019-05-23

## 2019-08-19 PROBLEM — S42.496A: Status: ACTIVE | Noted: 2019-05-23

## 2019-08-19 PROCEDURE — 99214 OFFICE O/P EST MOD 30 MIN: CPT | Mod: ZP | Performed by: INTERNAL MEDICINE

## 2019-08-19 PROCEDURE — G0463 HOSPITAL OUTPT CLINIC VISIT: HCPCS | Mod: ZF

## 2019-08-19 RX ORDER — CALCIUM CARBONATE 500(1250)
500 TABLET ORAL
COMMUNITY
Start: 2019-05-25 | End: 2019-08-27

## 2019-08-19 ASSESSMENT — PAIN SCALES - GENERAL: PAINLEVEL: NO PAIN (0)

## 2019-08-19 NOTE — LETTER
8/19/2019      RE: Noe Florence  [address] [facility] [address]       Noe Florence is here today in follow-up of colon cancer.    He had a very early stage cancer many years ago that I had followed him for.  More recently he was found to have a new cancer in his transverse colon for which she underwent resection in February of this year.  His tumor proved to be a stage II.  He has follow-up with me he has been delayed is in the postoperative.  He first had a fall and fractured his hip and then later had a fall and fractured his humerus.  He is now back at home finally and starting to get back to a little bit of activity.  He is eating and believes he is maintaining his weight.  He is still quite weak and using a walker to ambulate.    On physical exam he appears quite frail, but is alert and interactive.  I did not otherwise examine him today.    I reviewed with the patient and his girlfriend his labs, CT scan and the findings at the time of his surgery.  He presently has no evidence of disease on last week's CT scan.  His labs at that point showed unremarkable electrolytes and mild renal failure with a creatinine 1.56.  He has a normal albumin and liver enzymes.  He has mild normocytic anemia and otherwise unremarkable blood counts.  His CEA level is 4.5 which is consistent with his values over the last several years.    Assessment/plan: Resected stage II colon cancer.  I reviewed with patient and his wife that given the long delay since surgery and the early stage of his cancer, I would not recommend adjuvant therapy particularly in light of his advanced age and comorbidities.  We talked about how much value there was to close follow-up.  They would certainly like us to keep a close eye on things and I think it would not be unreasonable to get another scan in about 6 months.  He has had recommended to him a colonoscopy next year which may or may not be reasonable depending how his overall clinical  status goes over the next 6 months.    Total visit time today was approximately 25 minutes all spent on the above discussion.    Francisco Gilliam MD

## 2019-08-19 NOTE — NURSING NOTE
"Oncology Rooming Note    August 19, 2019 6:20 PM   Noe Florence is a 84 year old male who presents for:    Chief Complaint   Patient presents with     Oncology Clinic Visit     Return Colon Ca     Initial Vitals: /73   Pulse 72   Temp 96.9  F (36.1  C) (Tympanic)   Resp 16   Wt 68 kg (150 lb)   SpO2 98%   BMI 23.49 kg/m   Estimated body mass index is 23.49 kg/m  as calculated from the following:    Height as of 7/26/19: 1.702 m (5' 7\").    Weight as of this encounter: 68 kg (150 lb). Body surface area is 1.79 meters squared.  No Pain (0) Comment: Data Unavailable   No LMP for male patient.  Allergies reviewed: Yes  Medications reviewed: Yes    Medications: Medication refills not needed today.  Pharmacy name entered into General Specific: [facility] [address]    Clinical concerns: CT scan and lab results;       Rashmi Washington CMA              "

## 2019-08-19 NOTE — PROGRESS NOTES
Noe Florence is here today in follow-up of colon cancer.    He had a very early stage cancer many years ago that I had followed him for.  More recently he was found to have a new cancer in his transverse colon for which she underwent resection in February of this year.  His tumor proved to be a stage II.  He has follow-up with me he has been delayed is in the postoperative.  He first had a fall and fractured his hip and then later had a fall and fractured his humerus.  He is now back at home finally and starting to get back to a little bit of activity.  He is eating and believes he is maintaining his weight.  He is still quite weak and using a walker to ambulate.    On physical exam he appears quite frail, but is alert and interactive.  I did not otherwise examine him today.    I reviewed with the patient and his girlfriend his labs, CT scan and the findings at the time of his surgery.  He presently has no evidence of disease on last week's CT scan.  His labs at that point showed unremarkable electrolytes and mild renal failure with a creatinine 1.56.  He has a normal albumin and liver enzymes.  He has mild normocytic anemia and otherwise unremarkable blood counts.  His CEA level is 4.5 which is consistent with his values over the last several years.    Assessment/plan: Resected stage II colon cancer.  I reviewed with patient and his wife that given the long delay since surgery and the early stage of his cancer, I would not recommend adjuvant therapy particularly in light of his advanced age and comorbidities.  We talked about how much value there was to close follow-up.  They would certainly like us to keep a close eye on things and I think it would not be unreasonable to get another scan in about 6 months.  He has had recommended to him a colonoscopy next year which may or may not be reasonable depending how his overall clinical status goes over the next 6 months.    Total visit time today was approximately 25  minutes all spent on the above discussion.

## 2019-08-19 NOTE — TELEPHONE ENCOUNTER
RECORDS RECEIVED FROM: Internal   DATE RECEIVED: 8-23-19   NOTES STATUS DETAILS   OFFICE NOTE from referring provider    Internal    OFFICE NOTE from other cardiologist    Internal Well established in cardiology clinic   DISCHARGE SUMMARY from hospital    Care Everywhere 5-22-19   DISCHARGE REPORT from the ER   Internal 7-26-19   OPERATIVE REPORT    N/A    MEDICATION LIST   Internal    LABS     BMP   Internal 3-28-19   CBC   Internal 8-12-19   CMP   Internal 8-12-19   Lipids   Internal 8-12-19   TSH   Internal 3-27-18   DIAGNOSTIC PROCEDURES     EKG   Internal Most recent from [facility] scanned into chart   Monitor Reports   Internal    IMAGING (DISC & REPORT)      Echo   Internal 1-30-17   Stress Tests   N/A    Cath   N/A    MRI/MRA   N/A    CT/CTA   N/A

## 2019-08-21 NOTE — PROGRESS NOTES
CARDIOLOGY CLINIC FOLLOW UP    HPI:  Noe Florence is an 84 year old male who presents with his partner for follow up of an acute DVT.    The patient has a past medical history significant for  HTN, HLD, CKD3, CAD s/p CABG x2, DESx2, polymorphic VT s/p ICD placement (5/2012, gen change 12/2018), and AF/AFL s/p CTI (6/2014) and right atrial appendage atrial tachycardia ablation (9/2014). He has recurrent colon cancer and     He underwent colorectal surgery for colon cancer in February 2019.  In March 2019, patient fell and suffered a left hip fracture and underwent surgery.  In May 2019 patient fell again and suffered a distal right humerus fracture. With this fall history, his anticoagulation was stopped as Mr. Florence and his wife considered left atrial appendage occlusive device. He has a history of left femoral vein DVT several years ago but 3 weeks ago developed left LE edema again. He was evaluated in the ED on 7/26/19 and diagnosed with an occlusive popliteal vein DVT on ultrasound. He was started on lovenox and restarted on warfarin with goal INR for 1.5-2.5 per Dr. Desai.     Today his INR is at goal, 1.7.  He denies pain or wounds on his lower extremities. The edema has improved since starting warfarin and he has started wearing his compression stockings again. No chest pain, SOB, orthopnea, PND. No major recent falls. He is using a walker to walk.     His risk factors for VTE include minor trauma after small falls, less mobility due to his falls and fractures, malignancy.     ASSESSMENT AND PLAN  Unfortunately Mr. Florence has developed another left LE DVT after stopping his warfarin. He has risk factors which may not ever be completely resolved. He has a difficult balance of bleeding and clotting. For now he will remain on warfarin with a lower INR goal of 1.5-2.5. Future discussions will be needed to determine whether his bleeding risk or clotting risk is higher as his overall health continues to evolve  over time.     No symptoms of post-thrombotic syndrome. Agree with compression stockings.     PAST MEDICAL HISTORY:  Patient Active Problem List   Diagnosis     Rotator cuff (capsule) sprain     Other postprocedural status(V45.89)     Hyperkalemia     Anemia     Diarrhea     Diverticulitis of colon     Hypertension     CAD (coronary artery disease)     NSVT (nonsustained ventricular tachycardia) (H)     NSTEMI (non-ST elevated myocardial infarction) (H)     Automatic implantable cardioverter-defibrillator - Medtronic dual chamber ICD - Not Dependent     Skin exam, screening for cancer     Tinea cruris     Tinea corporis     Colon cancer (H)     Polymyalgia rheumatica (H)     S/P ablation of atrial flutter     Primary open angle glaucoma     Atrial fibrillation (H) [I48.91]     Long-term (current) use of anticoagulants [Z79.01]     Ischemic cardiomyopathy     CKD (chronic kidney disease), stage III (H)     Weight loss, unintentional     Skin infection     Tinea pedis of both feet     Venous stasis dermatitis of both lower extremities     Actinic keratosis     Inflamed seborrheic keratosis     Closed left subtrochanteric femur fracture (H)     Hip fracture (H)     Other osteoporosis without current pathological fracture     Pelvic floor dysfunction     Deep vein thrombosis (DVT) (H)     Other closed nondisplaced fracture of distal end of humerus, unspecified laterality, initial encounter     Syncope and collapse     Past Medical History:   Diagnosis Date     Advanced open-angle glaucoma      Atrial fibrillation (H)      CKD (chronic kidney disease), stage III (H) 2005     Colon cancer (H)     Stage II-B colon cancer     Coronary artery disease     s/p CABG x 2, JEREMY x 2     Diverticulitis      Hyperlipidemia      Hypertension      Ischemic cardiomyopathy      MGUS (monoclonal gammopathy of unknown significance)      Nonsenile cataract     BE     Osteoporosis     left hip fracture     Polymorphic ventricular tachycardia  (H)      Polymyalgia rheumatica (H)      PVD (posterior vitreous detachment), both eyes 2005     S/P ablation of atrial flutter 6/20/14    CTI     Stented coronary artery      SVT (supraventricular tachycardia) (H)     PPM/AICD for NSVT     Upper leg DVT (deep venous thromboembolism), chronic (H)     Left     Weight loss, unintentional 2017    15 lb in 4 months       CURRENT MEDICATIONS:  Current Outpatient Medications   Medication Sig Dispense Refill     alendronate (FOSAMAX) 70 MG tablet Take 1 tablet (70 mg) by mouth every 7 days Take with a full glass of water and do not eat or lay down for 30 minutes 12 tablet 3     ARIPiprazole (ABILIFY) 2 MG tablet Take 2 mg by mouth daily       atorvastatin (LIPITOR) 40 MG tablet Take 0.5 tablets (20 mg) by mouth daily as directed. 90 tablet 0     calcium carbonate 500 mg, elemental, (OSCAL;OYSTER SHELL CALCIUM) 500 MG tablet Take 500 mg by mouth       cholecalciferol 1000 units TABS Take 1,000 Units by mouth daily        Cyanocobalamin (VITAMIN B-12 CR PO)        ferrous sulfate (FE TABS) 325 (65 Fe) MG EC tablet Take 325 mg by mouth daily       metoprolol tartrate (LOPRESSOR) 25 MG tablet Take 12.5 mg by mouth 2 times daily  180 tablet 3     mirtazapine (REMERON) 15 MG tablet Take 15 mg by mouth At Bedtime.       Multiple Vitamins-Minerals (MULTIVITAMIN ADULT PO)        sertraline (ZOLOFT) 100 MG tablet Take 150 mg by mouth every evening        tamsulosin (FLOMAX) 0.4 MG capsule Take 2 capsules (0.8 mg) by mouth daily 30 capsule 3     warfarin (COUMADIN) 5 MG tablet Take 1 tablet (5 mg) by mouth daily Or as directed by Coumadin Clinic 60 tablet 0     acetaminophen (TYLENOL) 500 MG tablet Take 2 tablets (1,000 mg) by mouth 3 times daily as needed for mild pain (Patient not taking: Reported on 8/23/2019)         PAST SURGICAL HISTORY:  Past Surgical History:   Procedure Laterality Date     C CABG, ARTERY-VEIN, FOUR  2006    LIMA-LAD, SVG-Rt PDA, SVG-OM2, SVG-Diag 1     C  CABG, VEIN, SINGLE  1994    1-vessel CABG, SVG->PDRCA      CATARACT IOL, RT/LT Bilateral      COLONOSCOPY  3/13/2014    Procedure: COMBINED COLONOSCOPY, SINGLE BIOPSY/POLYPECTOMY BY BIOPSY;;  Surgeon: Mary Gerber MD;  [facility]     COLONOSCOPY N/A 6/22/2015    Procedure: COLONOSCOPY;  Surgeon: Marilin Newman MD;  Location:  GI     COLONOSCOPY N/A 11/7/2018    Procedure: COMBINED COLONOSCOPY, SINGLE OR MULTIPLE BIOPSY/POLYPECTOMY BY BIOPSY;  Surgeon: Roberto Esteban MD;  Location: [facility]     EP ICD GENERATOR CHANGE DUAL N/A 12/11/2018    Procedure: EP ICD Generator Change Dual;  Surgeon: Deedee Baird MD;  Location:  [facility]     H ABLATION ATRIAL FLUTTER       HEART CATH DRUG ELUTING STENT PLACEMENT  4/19/2012    JEREMY x 2 to LCx     IMPLANT IMPLANTABLE CARDIOVERTER DEFIBRILLATOR  5-    ppm/aicd     KNEE SURGERY      right and left knee surgeries     LAPAROSCOPIC ASSISTED COLECTOMY Right 2/1/2019    Procedure: Laparoscopic Converted to Open Transverse Colectomy with Lysis of Adhesions;  Surgeon: Chanelle Guzmán MD;  Location:  OR     LAPAROSCOPIC ASSISTED COLECTOMY LEFT (DESCENDING)  4/8/2014    Procedure: LAPAROSCOPIC ASSISTED COLECTOMY LEFT (DESCENDING);  Hand assisted Laparoscopic Sigmoid Colectomy , Laparoscopic mobilization of spleenic flexure *Latex Allergy*Anesthesia General with Block;  Surgeon: Chanelle Guzmán MD;  Location: [facility]     OPEN REDUCTION INTERNAL FIXATION RODDING INTRAMEDULLAR FEMUR FRACTURE TABLE Left 3/14/2019    Procedure: Open Reduction Internal Fixation Left Femur Intramedullar Nailing;  Surgeon: Srikanth Avalos MD;  [facility]     REPAIR VALVE MITRAL  2006     30-mm Medtronic Julian ring      SELECTIVE LASER TRABECULOPLASTY (SLT) OD (RIGHT EYE)  4/10, 1/12,+1/9/13    RE     SELECTIVE LASER TRABECULOPLASTY (SLT) OS (LEFT EYE)  5/2012     SHOULDER SURGERY      right rotator cuff  "      ALLERGIES  Latex    FAMILY HX:  Family History   Problem Relation Age of Onset     C.A.D. Father      Anesthesia Reaction No family hx of      Crohn's Disease No family hx of      Ulcerative Colitis No family hx of      Cancer - colorectal No family hx of      Macular Degeneration No family hx of      Cancer No family hx of         No known family hx of skin cancer     Melanoma No family hx of      Skin Cancer No family hx of        SOCIAL HX:  Social History     Tobacco Use     Smoking status: Former Smoker     Types: Cigarettes, Cigars     Last attempt to quit: 1968     Years since quittin.6     Smokeless tobacco: Never Used   Substance Use Topics     Alcohol use: Yes     Alcohol/week: 0.0 oz     Comment: 2-3 drinks twice weekly     Drug use: No        ROS:  Comprehensive ROS is negative except as noted in HPI.    VITAL SIGNS:  /73 (BP Location: Left arm, Patient Position: Chair, Cuff Size: Adult Regular)   Pulse 63   Ht 1.702 m (5' 7\")   Wt 67 kg (147 lb 11.2 oz)   SpO2 98%   BMI 23.13 kg/m    Body mass index is 23.13 kg/m .  Wt Readings from Last 2 Encounters:   19 67 kg (147 lb 11.2 oz)   19 68 kg (150 lb)       PHYSICAL EXAM  Gen: pleasant male sitting comfortably in NAD; frail appearing  Head: nc/at  CV: nml s1/s2, no murmur; no JVD  Chest: clear lungs  Abd: soft, NT, NABS  Ext: trace edema to knees on right and 1+ on left; venous stasis changes over left shin; small callous on left 3rd toe; no wounds  Skin: as above  Vasc: 1+ left DP and PT pulses  Neuro: awake, alert, oriented    LABS: personally reviewed  CMP  Recent Labs   Lab Test 19  1113 19  1045 19  1612 19  0923 19  0522 19  0600  03/15/19  0647  19  0526  19  0703 19  0556  19  0808   NA  --  141 142 141 144 145*   < > 142   < > 144   < > 144 141   < > 141   POTASSIUM  --  4.0 4.2 4.8 4.6 4.9   < > 4.3   < > 4.3   < > 3.5 4.2   < > 4.8   CHLORIDE  --  " 110* 113* 111* 112* 113*   < > 112*   < > 112*   < > 114* 112*   < > 112*   CO2  --  25 24 25 23 25   < > 23   < > 23   < > 20 23   < > 23   ANIONGAP  --  6 6 5 9 7   < > 8   < > 9   < > 9 6   < > 7   GLC  --  104* 105* 101* 76 82   < > 102*   < > 111*   < > 93 111*   < > 83   BUN  --  59* 57* 53* 47* 43*   < > 26   < > 38*   < > 40* 22   < > 30   CR  --  1.56* 1.44* 1.25 1.26* 1.20   < > 1.03   < > 1.28*   < > 1.27* 1.21   < > 1.34*   GFRESTIMATED 41* 40* 44* 53* 52* 55*   < > 66   < > 51*   < > 52* 55*   < > 48*   GFRESTBLACK 50* 47* 51* 61 60* 64   < > 77   < > 59*   < > 60* 63   < > 56*   KEITH  --  8.9 8.8 9.5 8.3* 8.1*   < > 8.3*   < > 8.1*   < > 8.3* 8.6   < > 8.3*   MAG  --   --   --   --   --   --   --  2.1  --  2.2  --  1.8 2.0  --  2.4*   PHOS  --   --   --   --   --   --   --   --   --  3.2  --  2.5 3.0  --  3.9   PROTTOTAL  --  7.7 6.8 7.2  --  6.0*  --   --   --   --   --   --   --   --   --    ALBUMIN  --  3.6 3.2* 3.4  --  2.5*  --   --   --  2.9*  --   --   --   --   --    BILITOTAL  --  0.2 0.2 0.4  --  0.4  --   --   --   --   --   --   --   --   --    ALKPHOS  --  128 142 181*  --  160*  --   --   --   --   --   --   --   --   --    AST  --  15 16 16  --  29  --   --   --   --   --   --   --   --   --    ALT  --  22 19 22  --  37  --   --   --   --   --   --   --   --   --     < > = values in this interval not displayed.     CBC  Recent Labs   Lab Test 08/12/19  1045 07/26/19  1612 07/01/19  0846 04/09/19  0923   WBC 8.0 6.6 7.2 7.5   RBC 3.55* 3.03* 3.14* 3.21*   HGB 10.6* 8.9* 9.5* 9.0*   HCT 34.6* 29.7* 31.1* 30.2*   MCV 98 98 99 94   MCH 29.9 29.4 30.3 28.0   MCHC 30.6* 30.0* 30.5* 29.8*   RDW 14.1 14.6 15.7* 16.8*    161 192 246     INR  Recent Labs   Lab Test 08/23/19  1314 08/12/19  1045 08/08/19 08/05/19   INR 1.67* 2.11* 1.8 1.9     Recent Labs   Lab Test 08/12/19  1045 01/15/18  1224  09/16/15  1238 05/22/14  1455   CHOL 146 119   < > 136 138   HDL 61 58   < > 43 45   LDL 55 46   <  > 72 68   TRIG 150* 76   < > 105 123   CHOLHDLRATIO  --   --   --  3.2 3.0    < > = values in this interval not displayed.     Total time spent: 30 minutes, of which >50% was spent in face-to-face patient evaluation, reviewing data with patient, and coordination of care.     Wyatt Mejias MD    [facility] Cardiology  Pager: [phone]

## 2019-08-23 ENCOUNTER — PRE VISIT (OUTPATIENT)
Dept: CARDIOLOGY | Facility: CLINIC | Age: 84
End: 2019-08-23

## 2019-08-23 ENCOUNTER — TELEPHONE (OUTPATIENT)
Dept: INTERNAL MEDICINE | Facility: CLINIC | Age: 84
End: 2019-08-23

## 2019-08-23 ENCOUNTER — OFFICE VISIT (OUTPATIENT)
Dept: CARDIOLOGY | Facility: CLINIC | Age: 84
End: 2019-08-23
Attending: INTERNAL MEDICINE
Payer: COMMERCIAL

## 2019-08-23 ENCOUNTER — ANTICOAGULATION THERAPY VISIT (OUTPATIENT)
Dept: ANTICOAGULATION | Facility: CLINIC | Age: 84
End: 2019-08-23

## 2019-08-23 VITALS
SYSTOLIC BLOOD PRESSURE: 125 MMHG | HEART RATE: 63 BPM | OXYGEN SATURATION: 98 % | HEIGHT: 67 IN | DIASTOLIC BLOOD PRESSURE: 73 MMHG | BODY MASS INDEX: 23.18 KG/M2 | WEIGHT: 147.7 LBS

## 2019-08-23 DIAGNOSIS — I82.409 DEEP VEIN THROMBOSIS (DVT) (H): ICD-10-CM

## 2019-08-23 DIAGNOSIS — Z79.01 LONG TERM CURRENT USE OF ANTICOAGULANT THERAPY: ICD-10-CM

## 2019-08-23 DIAGNOSIS — I48.91 ATRIAL FIBRILLATION (H): ICD-10-CM

## 2019-08-23 DIAGNOSIS — I48.91 ATRIAL FIBRILLATION, UNSPECIFIED TYPE (H): ICD-10-CM

## 2019-08-23 DIAGNOSIS — I82.432 ACUTE DEEP VEIN THROMBOSIS (DVT) OF POPLITEAL VEIN OF LEFT LOWER EXTREMITY (H): Primary | ICD-10-CM

## 2019-08-23 LAB — INR PPP: 1.67 (ref 0.86–1.14)

## 2019-08-23 PROCEDURE — G0463 HOSPITAL OUTPT CLINIC VISIT: HCPCS | Mod: ZF

## 2019-08-23 PROCEDURE — 99214 OFFICE O/P EST MOD 30 MIN: CPT | Mod: ZP | Performed by: INTERNAL MEDICINE

## 2019-08-23 ASSESSMENT — MIFFLIN-ST. JEOR: SCORE: 1318.59

## 2019-08-23 ASSESSMENT — PAIN SCALES - GENERAL: PAINLEVEL: NO PAIN (0)

## 2019-08-23 NOTE — PATIENT INSTRUCTIONS
You were seen today in the Cardiovascular Clinic at the [facility].     Cardiology Providers you saw during your visit: Dr. Mirian Mejias     Diagnosis:   Encounter Diagnosis   Name Primary?     Acute deep vein thrombosis (DVT) of popliteal vein of left lower extremity (H) Yes        Results: Discussed with you today: INR    Current Medication List  Current Outpatient Medications   Medication Sig Dispense Refill     alendronate (FOSAMAX) 70 MG tablet Take 1 tablet (70 mg) by mouth every 7 days Take with a full glass of water and do not eat or lay down for 30 minutes 12 tablet 3     ARIPiprazole (ABILIFY) 2 MG tablet Take 2 mg by mouth daily       atorvastatin (LIPITOR) 40 MG tablet Take 0.5 tablets (20 mg) by mouth daily as directed. 90 tablet 0     calcium carbonate 500 mg, elemental, (OSCAL;OYSTER SHELL CALCIUM) 500 MG tablet Take 500 mg by mouth       cholecalciferol 1000 units TABS Take 1,000 Units by mouth daily        Cyanocobalamin (VITAMIN B-12 CR PO)        ferrous sulfate (FE TABS) 325 (65 Fe) MG EC tablet Take 325 mg by mouth daily       metoprolol tartrate (LOPRESSOR) 25 MG tablet Take 12.5 mg by mouth 2 times daily  180 tablet 3     mirtazapine (REMERON) 15 MG tablet Take 15 mg by mouth At Bedtime.       Multiple Vitamins-Minerals (MULTIVITAMIN ADULT PO)        sertraline (ZOLOFT) 100 MG tablet Take 150 mg by mouth every evening        tamsulosin (FLOMAX) 0.4 MG capsule Take 2 capsules (0.8 mg) by mouth daily 30 capsule 3     warfarin (COUMADIN) 5 MG tablet Take 1 tablet (5 mg) by mouth daily Or as directed by Coumadin Clinic 60 tablet 0     acetaminophen (TYLENOL) 500 MG tablet Take 2 tablets (1,000 mg) by mouth 3 times daily as needed for mild pain (Patient not taking: Reported on 8/23/2019)         Recommendations:   1. No medication changes.   2. Follow up with Dr. Baird and Dr. Desai as scheduled this fall.        Please feel free to call me with any questions or concerns.       Cheryl  "[facility]     Questions: [phone].   First press #1 for the [employer] and then press #3 for \"Medical Questions\" to reach us Cardiology Nurses.     [phone].   First press #1 for the University and then press #1     On Call Cardiologist for after hours or on weekends: [phone]   option #4 and ask to speak to the on-call Cardiologist.          If you need a medication refill please contact your pharmacy.  Please allow 3 business days for your refill to be completed.  ________________________________________________________________________________________________________________________________  "

## 2019-08-23 NOTE — TELEPHONE ENCOUNTER
[facility] Call Center    Phone Message    May a detailed message be left on voicemail: yes    Reason for Call: Order(s): Home Care Orders: Physical Therapy (PT): .2 times a week for 3 weeks,   Home health aid support for bathing 2 times a week for 3 weeks    Action Taken: Message routed to:  [facility]

## 2019-08-23 NOTE — LETTER
8/23/2019      RE: Noe Florence  [address] [facility] [address]       Dear Colleague,    Thank you for the opportunity to participate in the care of your patient, Noe Florence, at the [facility]. Please see a copy of my visit note below.    CARDIOLOGY CLINIC FOLLOW UP    HPI:  Noe Florence is an 84 year old male who presents with his partner for follow up of an acute DVT.    The patient has a past medical history significant for  HTN, HLD, CKD3, CAD s/p CABG x2, DESx2, polymorphic VT s/p ICD placement (5/2012, gen change 12/2018), and AF/AFL s/p CTI (6/2014) and right atrial appendage atrial tachycardia ablation (9/2014). He has recurrent colon cancer and     He underwent colorectal surgery for colon cancer in February 2019.  In March 2019, patient fell and suffered a left hip fracture and underwent surgery.  In May 2019 patient fell again and suffered a distal right humerus fracture. With this fall history, his anticoagulation was stopped as Mr. Florence and his wife considered left atrial appendage occlusive device. He has a history of left femoral vein DVT several years ago but 3 weeks ago developed left LE edema again. He was evaluated in the ED on 7/26/19 and diagnosed with an occlusive popliteal vein DVT on ultrasound. He was started on lovenox and restarted on warfarin with goal INR for 1.5-2.5 per Dr. Desai.     Today his INR is at goal, 1.7.  He denies pain or wounds on his lower extremities. The edema has improved since starting warfarin and he has started wearing his compression stockings again. No chest pain, SOB, orthopnea, PND. No major recent falls. He is using a walker to walk.     His risk factors for VTE include minor trauma after small falls, less mobility due to his falls and fractures, malignancy.     ASSESSMENT AND PLAN  Unfortunately Mr. Florence has developed another left LE DVT after stopping his warfarin. He has risk  factors which may not ever be completely resolved. He has a difficult balance of bleeding and clotting. For now he will remain on warfarin with a lower INR goal of 1.5-2.5. Future discussions will be needed to determine whether his bleeding risk or clotting risk is higher as his overall health continues to evolve over time.     No symptoms of post-thrombotic syndrome. Agree with compression stockings.     PAST MEDICAL HISTORY:  Patient Active Problem List   Diagnosis     Rotator cuff (capsule) sprain     Other postprocedural status(V45.89)     Hyperkalemia     Anemia     Diarrhea     Diverticulitis of colon     Hypertension     CAD (coronary artery disease)     NSVT (nonsustained ventricular tachycardia) (H)     NSTEMI (non-ST elevated myocardial infarction) (H)     Automatic implantable cardioverter-defibrillator - Medtronic dual chamber ICD - Not Dependent     Skin exam, screening for cancer     Tinea cruris     Tinea corporis     Colon cancer (H)     Polymyalgia rheumatica (H)     S/P ablation of atrial flutter     Primary open angle glaucoma     Atrial fibrillation (H) [I48.91]     Long-term (current) use of anticoagulants [Z79.01]     Ischemic cardiomyopathy     CKD (chronic kidney disease), stage III (H)     Weight loss, unintentional     Skin infection     Tinea pedis of both feet     Venous stasis dermatitis of both lower extremities     Actinic keratosis     Inflamed seborrheic keratosis     Closed left subtrochanteric femur fracture (H)     Hip fracture (H)     Other osteoporosis without current pathological fracture     Pelvic floor dysfunction     Deep vein thrombosis (DVT) (H)     Other closed nondisplaced fracture of distal end of humerus, unspecified laterality, initial encounter     Syncope and collapse     Past Medical History:   Diagnosis Date     Advanced open-angle glaucoma      Atrial fibrillation (H)      CKD (chronic kidney disease), stage III (H) 2005     Colon cancer (H)     Stage II-B colon  cancer     Coronary artery disease     s/p CABG x 2, JEREMY x 2     Diverticulitis      Hyperlipidemia      Hypertension      Ischemic cardiomyopathy      MGUS (monoclonal gammopathy of unknown significance)      Nonsenile cataract     BE     Osteoporosis     left hip fracture     Polymorphic ventricular tachycardia (H)      Polymyalgia rheumatica (H)      PVD (posterior vitreous detachment), both eyes 2005     S/P ablation of atrial flutter 6/20/14    CTI     Stented coronary artery      SVT (supraventricular tachycardia) (H)     PPM/AICD for NSVT     Upper leg DVT (deep venous thromboembolism), chronic (H)     Left     Weight loss, unintentional 2017    15 lb in 4 months       CURRENT MEDICATIONS:  Current Outpatient Medications   Medication Sig Dispense Refill     alendronate (FOSAMAX) 70 MG tablet Take 1 tablet (70 mg) by mouth every 7 days Take with a full glass of water and do not eat or lay down for 30 minutes 12 tablet 3     ARIPiprazole (ABILIFY) 2 MG tablet Take 2 mg by mouth daily       atorvastatin (LIPITOR) 40 MG tablet Take 0.5 tablets (20 mg) by mouth daily as directed. 90 tablet 0     calcium carbonate 500 mg, elemental, (OSCAL;OYSTER SHELL CALCIUM) 500 MG tablet Take 500 mg by mouth       cholecalciferol 1000 units TABS Take 1,000 Units by mouth daily        Cyanocobalamin (VITAMIN B-12 CR PO)        ferrous sulfate (FE TABS) 325 (65 Fe) MG EC tablet Take 325 mg by mouth daily       metoprolol tartrate (LOPRESSOR) 25 MG tablet Take 12.5 mg by mouth 2 times daily  180 tablet 3     mirtazapine (REMERON) 15 MG tablet Take 15 mg by mouth At Bedtime.       Multiple Vitamins-Minerals (MULTIVITAMIN ADULT PO)        sertraline (ZOLOFT) 100 MG tablet Take 150 mg by mouth every evening        tamsulosin (FLOMAX) 0.4 MG capsule Take 2 capsules (0.8 mg) by mouth daily 30 capsule 3     warfarin (COUMADIN) 5 MG tablet Take 1 tablet (5 mg) by mouth daily Or as directed by Coumadin Clinic 60 tablet 0     acetaminophen  (TYLENOL) 500 MG tablet Take 2 tablets (1,000 mg) by mouth 3 times daily as needed for mild pain (Patient not taking: Reported on 8/23/2019)         PAST SURGICAL HISTORY:  Past Surgical History:   Procedure Laterality Date     C CABG, ARTERY-VEIN, FOUR  2006    LIMA-LAD, SVG-Rt PDA, SVG-OM2, SVG-Diag 1     C CABG, VEIN, SINGLE  1994    1-vessel CABG, SVG->PDRCA      CATARACT IOL, RT/LT Bilateral      COLONOSCOPY  3/13/2014    Procedure: COMBINED COLONOSCOPY, SINGLE BIOPSY/POLYPECTOMY BY BIOPSY;;  Surgeon: Mary Gerber MD;  Location:  GI     COLONOSCOPY N/A 6/22/2015    Procedure: COLONOSCOPY;  Surgeon: Marilin Newman MD;  Location:  GI     COLONOSCOPY N/A 11/7/2018    Procedure: COMBINED COLONOSCOPY, SINGLE OR MULTIPLE BIOPSY/POLYPECTOMY BY BIOPSY;  Surgeon: Roberto Esteban MD;  Location:  GI     EP ICD GENERATOR CHANGE DUAL N/A 12/11/2018    Procedure: EP ICD Generator Change Dual;  Surgeon: Deedee Baird MD;  Location:  HEART CARDIAC CATH LAB     H ABLATION ATRIAL FLUTTER       HEART CATH DRUG ELUTING STENT PLACEMENT  4/19/2012    JEREMY x 2 to LCx     IMPLANT IMPLANTABLE CARDIOVERTER DEFIBRILLATOR  5-    ppm/aicd     KNEE SURGERY      right and left knee surgeries     LAPAROSCOPIC ASSISTED COLECTOMY Right 2/1/2019    Procedure: Laparoscopic Converted to Open Transverse Colectomy with Lysis of Adhesions;  Surgeon: Chanelle Guzmán MD;  Location:  OR     LAPAROSCOPIC ASSISTED COLECTOMY LEFT (DESCENDING)  4/8/2014    Procedure: LAPAROSCOPIC ASSISTED COLECTOMY LEFT (DESCENDING);  Hand assisted Laparoscopic Sigmoid Colectomy , Laparoscopic mobilization of spleenic flexure *Latex Allergy*Anesthesia General with Block;  Surgeon: Chanelle Guzmán MD;  Location:  OR     OPEN REDUCTION INTERNAL FIXATION RODDING INTRAMEDULLAR FEMUR FRACTURE TABLE Left 3/14/2019    Procedure: Open Reduction Internal Fixation Left Femur Intramedullar Nailing;  Surgeon: Robbie  "Srikanth Catalan MD;  Location: [facility] OR     REPAIR VALVE MITRAL  2006     30-mm Medtronic Julian ring      SELECTIVE LASER TRABECULOPLASTY (SLT) OD (RIGHT EYE)  4/10, ,+13    RE     SELECTIVE LASER TRABECULOPLASTY (SLT) OS (LEFT EYE)  2012     SHOULDER SURGERY      right rotator cuff       ALLERGIES  Latex    FAMILY HX:  Family History   Problem Relation Age of Onset     C.A.D. Father      Anesthesia Reaction No family hx of      Crohn's Disease No family hx of      Ulcerative Colitis No family hx of      Cancer - colorectal No family hx of      Macular Degeneration No family hx of      Cancer No family hx of         No known family hx of skin cancer     Melanoma No family hx of      Skin Cancer No family hx of        SOCIAL HX:  Social History     Tobacco Use     Smoking status: Former Smoker     Types: Cigarettes, Cigars     Last attempt to quit: 1968     Years since quittin.6     Smokeless tobacco: Never Used   Substance Use Topics     Alcohol use: Yes     Alcohol/week: 0.0 oz     Comment: 2-3 drinks twice weekly     Drug use: No        ROS:  Comprehensive ROS is negative except as noted in HPI.    VITAL SIGNS:  /73 (BP Location: Left arm, Patient Position: Chair, Cuff Size: Adult Regular)   Pulse 63   Ht 1.702 m (5' 7\")   Wt 67 kg (147 lb 11.2 oz)   SpO2 98%   BMI 23.13 kg/m     Body mass index is 23.13 kg/m .  Wt Readings from Last 2 Encounters:   19 67 kg (147 lb 11.2 oz)   19 68 kg (150 lb)       PHYSICAL EXAM  Gen: pleasant male sitting comfortably in NAD; frail appearing  Head: nc/at  CV: nml s1/s2, no murmur; no JVD  Chest: clear lungs  Abd: soft, NT, NABS  Ext: trace edema to knees on right and 1+ on left; venous stasis changes over left shin; small callous on left 3rd toe; no wounds  Skin: as above  Vasc: 1+ left DP and PT pulses  Neuro: awake, alert, oriented    LABS: personally reviewed  CMP  Recent Labs   Lab Test 19  1113 19  1045 19  1612 " 04/09/19  0923 03/28/19  0522 03/25/19  0600  03/15/19  0647  03/13/19  0526  02/07/19  0703 02/05/19  0556  02/02/19  0808   NA  --  141 142 141 144 145*   < > 142   < > 144   < > 144 141   < > 141   POTASSIUM  --  4.0 4.2 4.8 4.6 4.9   < > 4.3   < > 4.3   < > 3.5 4.2   < > 4.8   CHLORIDE  --  110* 113* 111* 112* 113*   < > 112*   < > 112*   < > 114* 112*   < > 112*   CO2  --  25 24 25 23 25   < > 23   < > 23   < > 20 23   < > 23   ANIONGAP  --  6 6 5 9 7   < > 8   < > 9   < > 9 6   < > 7   GLC  --  104* 105* 101* 76 82   < > 102*   < > 111*   < > 93 111*   < > 83   BUN  --  59* 57* 53* 47* 43*   < > 26   < > 38*   < > 40* 22   < > 30   CR  --  1.56* 1.44* 1.25 1.26* 1.20   < > 1.03   < > 1.28*   < > 1.27* 1.21   < > 1.34*   GFRESTIMATED 41* 40* 44* 53* 52* 55*   < > 66   < > 51*   < > 52* 55*   < > 48*   GFRESTBLACK 50* 47* 51* 61 60* 64   < > 77   < > 59*   < > 60* 63   < > 56*   KEITH  --  8.9 8.8 9.5 8.3* 8.1*   < > 8.3*   < > 8.1*   < > 8.3* 8.6   < > 8.3*   MAG  --   --   --   --   --   --   --  2.1  --  2.2  --  1.8 2.0  --  2.4*   PHOS  --   --   --   --   --   --   --   --   --  3.2  --  2.5 3.0  --  3.9   PROTTOTAL  --  7.7 6.8 7.2  --  6.0*  --   --   --   --   --   --   --   --   --    ALBUMIN  --  3.6 3.2* 3.4  --  2.5*  --   --   --  2.9*  --   --   --   --   --    BILITOTAL  --  0.2 0.2 0.4  --  0.4  --   --   --   --   --   --   --   --   --    ALKPHOS  --  128 142 181*  --  160*  --   --   --   --   --   --   --   --   --    AST  --  15 16 16  --  29  --   --   --   --   --   --   --   --   --    ALT  --  22 19 22  --  37  --   --   --   --   --   --   --   --   --     < > = values in this interval not displayed.     CBC  Recent Labs   Lab Test 08/12/19  1045 07/26/19  1612 07/01/19  0846 04/09/19  0923   WBC 8.0 6.6 7.2 7.5   RBC 3.55* 3.03* 3.14* 3.21*   HGB 10.6* 8.9* 9.5* 9.0*   HCT 34.6* 29.7* 31.1* 30.2*   MCV 98 98 99 94   MCH 29.9 29.4 30.3 28.0   MCHC 30.6* 30.0* 30.5* 29.8*   RDW 14.1 14.6  15.7* 16.8*    161 192 246     INR  Recent Labs   Lab Test 08/23/19  1314 08/12/19  1045 08/08/19 08/05/19   INR 1.67* 2.11* 1.8 1.9     Recent Labs   Lab Test 08/12/19  1045 01/15/18  1224  09/16/15  1238 05/22/14  1455   CHOL 146 119   < > 136 138   HDL 61 58   < > 43 45   LDL 55 46   < > 72 68   TRIG 150* 76   < > 105 123   CHOLHDLRATIO  --   --   --  3.2 3.0    < > = values in this interval not displayed.     Total time spent: 30 minutes, of which >50% was spent in face-to-face patient evaluation, reviewing data with patient, and coordination of care.     Wyatt Mejias MD    Preventive Cardiology  Pager: [phone]

## 2019-08-23 NOTE — PROGRESS NOTES
ANTICOAGULATION FOLLOW-UP CLINIC VISIT    Patient Name:  Noe Florence  Date:  2019  Contact Type:  Telephone    SUBJECTIVE:         OBJECTIVE    INR   Date Value Ref Range Status   2019 1.67 (H) 0.86 - 1.14 Final       ASSESSMENT / PLAN  No question data found.  Anticoagulation Summary  As of 2019    INR goal:   1.5-2.5   TTR:   62.6 % (3 y)   INR used for dosin.67 (2019)   Warfarin maintenance plan:   2.5 mg (5 mg x 0.5) every Tue, Thu; 5 mg (5 mg x 1) all other days   Full warfarin instructions:   2.5 mg every Tue, Thu; 5 mg all other days   Weekly warfarin total:   30 mg   Plan last modified:   Ciro Sharp RN (2019)   Next INR check:   2019   Priority:   INR   Target end date:   Indefinite    Indications    Atrial fibrillation (H) [I48.91] [I48.91]  Long-term (current) use of anticoagulants [Z79.01] [Z79.01]  Deep vein thrombosis (DVT) (H) [I82.409]             Anticoagulation Episode Summary     INR check location:       Preferred lab:       Send INR reminders to:   [facility]    Comments:   Patient contact number: [phone] Hx of Falls/Bleed  Can leave results with Rica (friend)  [facility] see's pt. 19 Restarted Warfarin due to DVT.       Anticoagulation Care Providers     Provider Role Specialty Phone number    Frida Desai MD Responsible Cardiology [phone]            See the Encounter Report to view Anticoagulation Flowsheet and Dosing Calendar (Go to Encounters tab in chart review, and find the Anticoagulation Therapy Visit)  Left message for patient with results and dosing recommendations. Asked patient to call back to report any missed doses, falls, signs and symptoms of bleeding or clotting, any changes in health, medication, or diet. Asked patient to call back with any questions or concerns.      Leena Abebe RN

## 2019-08-23 NOTE — NURSING NOTE
Chief Complaint   Patient presents with     Follow Up     present to follow up on blood clot on lower left leg     Vitals were taken and medications were reconciled.    Maryam Shafer  1:29 PM

## 2019-08-26 DIAGNOSIS — S72.22XA: Primary | ICD-10-CM

## 2019-08-26 DIAGNOSIS — R35.0 URINARY FREQUENCY: ICD-10-CM

## 2019-08-26 NOTE — TELEPHONE ENCOUNTER
Drug: flomax 0.4 mg capsules   Last Fill Date: 7/17/2019  Last Fill Quantity: 60  Last Office Visit: 07/31/2019    Drug: Oyster shell calcium 500mg tablets  Last Fill Date: 07/17/2019  Last Fill Quantity: 60  Last Office Visit: 07/31/2019    Thanks  Karla Reddy [facility]

## 2019-08-26 NOTE — MR AVS SNAPSHOT
After Visit Summary   3/30/2018    Noe Florence    MRN: [id number]           Patient Information     Date Of Birth          1935        Visit Information        Provider Department      3/30/2018 1:00 PM Rica Bauman PT [facility]        Today's Diagnoses     Gait instability    -  1    Acquired postural kyphosis        At high risk for falls        Generalized muscle weakness        Left foot drop           Follow-ups after your visit        Your next 10 appointments already scheduled     Apr 02, 2018 10:45 AM CDT   (Arrive by 10:30 AM)   Return Visit with Francisco Gilliam MD   [facility] ([facility])    [address]  [address]  [place] [address]   [phone]            Apr 03, 2018 11:15 AM CDT   (Arrive by 11:00 AM)   Neuro Treatment with [facility] ([facility])    [address]  [address] [facility] [address]   [phone]            Apr 03, 2018  1:00 PM CDT   Treatment 60 with HER[facility] ([facility])    [address]  [address] [facility] [address]   [phone]            Apr 10, 2018 11:00 AM CDT   EMG with Jase Duarte MD   [facility] ([facility])    [address]  [address]  [place] [address]   [phone]            Apr 13, 2018  9:00 AM CDT   (Arrive by 8:45 AM)   Neuro Treatment with [facility])    [address]  [address] [facility] [address]   [phone]            Apr 17, 2018 11:15 AM CDT   (Arrive by 11:00 AM)   Neuro Treatment with LU [facility] ([facility])    [address]  [address] [facility] [address]   [phone]            Apr 20, 2018  8:30 AM CDT   (Arrive by 8:15 AM)   Neuro  Treatment with Rica Bauman PT   [facility] ([facility])    [address]  [address] [facility] [address] [phone]            Apr 24, 2018 11:15 AM CDT   (Arrive by 11:00 AM)   Neuro Treatment with Rica Bauman PT   [facility] ([facility])    [address]  [address] [facility] [address] [phone]            Apr 27, 2018  8:30 AM CDT   (Arrive by 8:15 AM)   Neuro Treatment with Rica Bauman PT   [facility] ([facility])    [address]  [address] [facility] [address] [phone]            May 21, 2018  3:30 PM CDT   (Arrive by 3:15 PM)   Return Visit with Lashawn Jackson MD   [facility] ([facility])    [address] [place] [address]  [address] [facility] [id number] [phone]              Who to contact     Please call your clinic at [phone] to:    Ask questions about your health    Make or cancel appointments    Discuss your medicines    Learn about your test results    Speak to your doctor            Additional Information About Your Visit        Neonode Information     Neonode gives you secure access to your electronic health record. If you see a primary care provider, you can also send messages to your care team and make appointments. If you have questions, please call your primary care clinic.  If you do not have a primary care provider, please call [phone] and they will assist you.      Neonode is an electronic gateway that provides easy, online access to your medical records. With [employer], you can request a clinic appointment, read your test results, renew a prescription or communicate with your care team.     To access your existing account, please contact your [facility] or call [phone] for assistance.        Care EveryWhere ID     This is your Care EveryWhere ID. This  could be used by other organizations to access your [facility] medical records  [id number]         Blood Pressure from Last 3 Encounters:   03/27/18 124/57   03/23/18 125/74   02/12/18 95/51    Weight from Last 3 Encounters:   03/27/18 66.8 kg (147 lb 3.2 oz)   03/23/18 65.1 kg (143 lb 8 oz)   01/15/18 67.8 kg (149 lb 8 oz)              Today, you had the following     No orders found for display       Primary Care Provider Office Phone # Fax #    Roberto Sarmiento [phone]       [address] [address]        Equal Access to Services     LEE CASTILLO : Delilah Bains, waalondra harmon, qaybta kaalmada aicha, sil minor. So [facility] [phone].    ATENCIÓN: Si habla español, tiene a aguirre disposición servicios gratuitos de asistencia lingüística. Llame al [phone].    We comply with applicable federal civil rights laws and [place] laws. We do not discriminate on the basis of race, color, national origin, age, disability, sex, sexual orientation, or gender identity.            Thank you!     Thank you for choosing [facility]  for your care. Our goal is always to provide you with excellent care. Hearing back from our patients is one way we can continue to improve our services. Please take a few minutes to complete the written survey that you may receive in the mail after your visit with us. Thank you!             Your Updated Medication List - Protect others around you: Learn how to safely use, store and throw away your medicines at www.disposemymeds.org.          This list is accurate as of 3/30/18  4:48 PM.  Always use your most recent med list.                   Brand Name Dispense Instructions for use Diagnosis    aspirin 81 MG tablet      Take 1 tablet by mouth daily.        atorvastatin 40 MG tablet    LIPITOR    100 tablet    Take 1 tablet (40 mg) by mouth daily    Hyperlipidemia, unspecified hyperlipidemia  type       bacitracin ointment     28 g    Apply topically 2 times daily APPLY TO LEFT LEG TWO TIMES PER DAY    Left leg cellulitis       Blood Pressure Monitor Kit     1 kit    1 Units daily    Hypertension       CENTRUM SILVER per tablet      Take 1 tablet by mouth daily. AM        ciclopirox 0.77 % cream    LOPROX    90 g    Apply topically 2 times daily To feet and toenails.    Dermatophytosis of nail, Tinea pedis of both feet       cyanocobalamin 1000 MCG tablet    vitamin  B-12    180 tablet    Take 2 tablets (2,000 mcg) by mouth daily    Anemia       doxazosin 2 MG tablet    CARDURA    90 tablet    Take 1/2 tab in the morning and 1/2 tab in the evening    Essential hypertension       fluticasone 50 MCG/ACT spray    FLONASE    3 Package    Spray 2 sprays into both nostrils daily    Unspecified sinusitis (chronic)       ketoconazole 2 % cream    NIZORAL    60 g    Apply topically daily On hold    Tinea corporis       loratadine 10 MG tablet    CLARITIN     Take 10 mg by mouth as needed        metoprolol tartrate 25 MG tablet    LOPRESSOR    180 tablet    Take 1 tablet (25 mg) by mouth 2 times daily    Coronary artery disease involving native heart without angina pectoris, unspecified vessel or lesion type       mirtazapine 15 MG tablet    REMERON     Take 15 mg by mouth At Bedtime.        order for DME     1 Units    20-30 mm Hg knee length compression stockings.  Measure and fit.  Style and color per patient preference.  Pat Hsu per patient need.    PVD (peripheral vascular disease) (H), Ulcer of left lower extremity, limited to breakdown of skin (H)       triamcinolone 0.1 % cream    KENALOG    80 g    Apply topically 2 times daily For up to two weeks in a row    Atopic eczema       warfarin 5 MG tablet    COUMADIN    35 tablet    7.5 mg on Wed; 5 mg all other days  or as instructed by coumadin clinic.    Atrial flutter (H)       ZOLOFT 100 MG tablet   Generic drug:  sertraline      Take 100 mg by mouth  every evening.           Warm/Dry

## 2019-08-26 NOTE — PROGRESS NOTES
8/26/19 Addendum:  Rica bullock.  Noe forgot warfarin dose last night, 8/25/19.  He will take a 7.5 mg dose of warfarin today; then go back on his maintenance dose.  Calendar updated. Danya Edwards RN

## 2019-08-27 RX ORDER — TAMSULOSIN HYDROCHLORIDE 0.4 MG/1
0.8 CAPSULE ORAL DAILY
Qty: 180 CAPSULE | Refills: 3 | Status: SHIPPED | OUTPATIENT
Start: 2019-08-27 | End: 2020-08-31

## 2019-08-27 NOTE — TELEPHONE ENCOUNTER
calcium carbonate 500 mg,   Last Written Prescription Date:  3/18/19  Last Fill Quantity: UNK,   # refills: UNK  Last Office Visit : 7/31/19  Future Office visit:  11/18/19  Routing refill request to provider for review/approval because:  Drug not active on patient's medication list  DISCONTINUED @ 3/29/19 DISCHARGE

## 2019-08-28 RX ORDER — CALCIUM CARBONATE 500(1250)
500 TABLET ORAL 2 TIMES DAILY
Qty: 180 TABLET | Refills: 3 | Status: ON HOLD | OUTPATIENT
Start: 2019-08-28 | End: 2024-01-01

## 2019-08-28 NOTE — NURSING NOTE
Chief Complaint   Patient presents with     Follow Up For     1 yr f/u AT, AF, AFL. ICD check     Vitals were taken and medications were reconciled.  Bimal Hager, ELIJAH  11:51 AM    
Detail Level: Simple

## 2019-08-29 ENCOUNTER — DOCUMENTATION ONLY (OUTPATIENT)
Dept: CARE COORDINATION | Facility: CLINIC | Age: 84
End: 2019-08-29

## 2019-08-29 NOTE — PROGRESS NOTES
[facility] now requests orders and shares plan of care/discharge summaries for some patients through [employer].  Please REPLY TO THIS MESSAGE OR ROUTE BACK TO THE AUTHOR in order to give authorization for orders when needed.  This is considered a verbal order, you will still receive a faxed copy of orders for signature.  Thank you for your assistance in improving collaboration for our patients.    ORDER    OT continued treatment 2w2 to progress functional strengthening, reduce incontinence, improve R UE ROM, and increase safety/independence with ADLs. The plan will also include falls prevention plan, monitor and treat pain, monitor skin integrity, continence management, monitor for s/s of depression, monitor for symptoms of heart failure and to achieve the following goals.    1. Pt will tolerate 8 min of UE activity with O2 sats at 90 percent or greater with mod I./ongoing   2. Pt will complete morning ADL routine incorporating energy conservation techniques maintaining O2 sats at 90 percent or greater with mod I./ongoing   3. Pt will demonstrate improvement of 20 degrees in R SH flexion to 110 degrees AROM to increase independence with completion of functional reaching during ADLs./ongoing  4. Primary caregiver and pt will demonstrate understanding of continence home program with pt follow through for decreased incontinence, urgencies, product use and overall safety with toileting.  /ongoing    Annamaria Cooley OTR/L  [facility]  [phone]    Agree  Roberto Sarmiento MD

## 2019-09-06 ENCOUNTER — ANTICOAGULATION THERAPY VISIT (OUTPATIENT)
Dept: ANTICOAGULATION | Facility: CLINIC | Age: 84
End: 2019-09-06

## 2019-09-06 DIAGNOSIS — I82.409 DEEP VEIN THROMBOSIS (DVT) (H): ICD-10-CM

## 2019-09-06 DIAGNOSIS — I48.91 ATRIAL FIBRILLATION (H): ICD-10-CM

## 2019-09-06 DIAGNOSIS — Z79.01 LONG TERM CURRENT USE OF ANTICOAGULANT THERAPY: ICD-10-CM

## 2019-09-06 DIAGNOSIS — I48.91 ATRIAL FIBRILLATION, UNSPECIFIED TYPE (H): ICD-10-CM

## 2019-09-06 LAB — INR PPP: 1.24 (ref 0.86–1.14)

## 2019-09-06 NOTE — PROGRESS NOTES
Nicolette called to let us know that patient had missed a dose of coumadin 5mg on , and she just realized it today.  He will increase his dose for the next two days to 7.5mg to make up the missed dose.  Meanwhile, they are coming in today for the next INR, so it will reflect the missed dose but not the adjustment.  Please keep this in mind with the next recommendation.  ANTICOAGULATION FOLLOW-UP CLINIC VISIT    Patient Name:  Noe Florence  Date:  2019  Contact Type:  Telephone    SUBJECTIVE:  Patient Findings     Positives:   Missed doses (-missed coumadin 5mg dose.)             OBJECTIVE    INR   Date Value Ref Range Status   2019 1.24 (H) 0.86 - 1.14 Final       ASSESSMENT / PLAN  INR assessment SUB    Recheck INR In: 1 WEEK    INR Location Clinic      Anticoagulation Summary  As of 2019    INR goal:   1.5-2.5   TTR:   62.6 % (3 y)   INR used for dosin.24! (2019)   Warfarin maintenance plan:   2.5 mg (5 mg x 0.5) every Tue, Thu; 5 mg (5 mg x 1) all other days   Full warfarin instructions:   : 7.5 mg; : 7.5 mg; Otherwise 2.5 mg every Tue, Thu; 5 mg all other days   Weekly warfarin total:   30 mg   Plan last modified:   Ciro Sharp RN (2019)   Next INR check:   2019   Priority:   INR   Target end date:   Indefinite    Indications    Atrial fibrillation (H) [I48.91] [I48.91]  Long-term (current) use of anticoagulants [Z79.01] [Z79.01]  Deep vein thrombosis (DVT) (H) [I82.409]             Anticoagulation Episode Summary     INR check location:       Preferred lab:       Send INR reminders to:   [facility]    Comments:   Patient contact number: [phone] Hx of Falls/Bleed  Can leave results with Rica (friend)  [facility] see[facility] pt. 19 Restarted Warfarin due to DVT.       Anticoagulation Care Providers     Provider Role Specialty Phone number    Frida Desai MD Responsible Cardiology [phone]            See the Encounter Report to view  Anticoagulation Flowsheet and Dosing Calendar (Go to Encounters tab in chart review, and find the Anticoagulation Therapy Visit)  Spoke with Nicolette again Monday.    Leena Abebe RN

## 2019-09-09 ENCOUNTER — DOCUMENTATION ONLY (OUTPATIENT)
Dept: CARE COORDINATION | Facility: CLINIC | Age: 84
End: 2019-09-09

## 2019-09-09 NOTE — PROGRESS NOTES
[facility] now requests orders and shares plan of care/discharge summaries for some patients through [employer].  Please REPLY TO THIS MESSAGE OR ROUTE BACK TO THE AUTHOR in order to give authorization for orders when needed.  This is considered a verbal order, you will still receive a faxed copy of orders for signature.  Thank you for your assistance in improving collaboration for our patients.    REFERRAL REQUEST.  Homecare completing service this week. Recommending Outpatient PT/OT.  OT for continued R UE elbow/shoulder ROM and strengthening and continence exercises and PT for LE strengthening, general strengthening and gait/balance drills.  [facility].    Antione Cortes PT  [phone]  [email or web address]    Agree  Roberto Sarmiento MD

## 2019-09-11 ENCOUNTER — MEDICAL CORRESPONDENCE (OUTPATIENT)
Dept: HEALTH INFORMATION MANAGEMENT | Facility: CLINIC | Age: 84
End: 2019-09-11

## 2019-09-12 ENCOUNTER — TELEPHONE (OUTPATIENT)
Dept: ORTHOPEDICS | Facility: CLINIC | Age: 84
End: 2019-09-12

## 2019-09-12 NOTE — TELEPHONE ENCOUNTER
Health Call Center    Phone Message    May a detailed message be left on voicemail: yes    Reason for Call: Other: Pt partner, Rica calling to schedule f/u appt with Robbie. Pt was supposed to come in for a 6wk f/u but at that time pt had fractured his elbow and had been in transitional care and unable to walk well for some time. Pt is now able to walk with a walker again.     [facility] does not have anything available. Please f/u with Rica to assist with scheduling.     Action Taken: Message routed to:  [facility] ([facility] jalen bernal

## 2019-09-13 DIAGNOSIS — Z51.81 ENCOUNTER FOR MONITORING WARFARIN THERAPY: ICD-10-CM

## 2019-09-13 DIAGNOSIS — Z98.890 S/P ORIF (OPEN REDUCTION INTERNAL FIXATION) FRACTURE: Primary | ICD-10-CM

## 2019-09-13 DIAGNOSIS — Z79.01 ENCOUNTER FOR MONITORING WARFARIN THERAPY: ICD-10-CM

## 2019-09-13 DIAGNOSIS — I48.91 ATRIAL FIBRILLATION (H): ICD-10-CM

## 2019-09-13 DIAGNOSIS — Z87.81 S/P ORIF (OPEN REDUCTION INTERNAL FIXATION) FRACTURE: Primary | ICD-10-CM

## 2019-09-13 LAB — INR PPP: 1.44 (ref 0.86–1.14)

## 2019-09-16 ENCOUNTER — ANTICOAGULATION THERAPY VISIT (OUTPATIENT)
Dept: ANTICOAGULATION | Facility: CLINIC | Age: 84
End: 2019-09-16

## 2019-09-16 DIAGNOSIS — Z79.01 LONG TERM CURRENT USE OF ANTICOAGULANT THERAPY: ICD-10-CM

## 2019-09-16 DIAGNOSIS — I48.91 ATRIAL FIBRILLATION, UNSPECIFIED TYPE (H): ICD-10-CM

## 2019-09-16 DIAGNOSIS — I82.409 DEEP VEIN THROMBOSIS (DVT) (H): ICD-10-CM

## 2019-09-17 ENCOUNTER — ANCILLARY PROCEDURE (OUTPATIENT)
Dept: GENERAL RADIOLOGY | Facility: CLINIC | Age: 84
End: 2019-09-17
Attending: ORTHOPAEDIC SURGERY
Payer: COMMERCIAL

## 2019-09-17 ENCOUNTER — OFFICE VISIT (OUTPATIENT)
Dept: ORTHOPEDICS | Facility: CLINIC | Age: 84
End: 2019-09-17
Payer: COMMERCIAL

## 2019-09-17 DIAGNOSIS — Z87.81 S/P ORIF (OPEN REDUCTION INTERNAL FIXATION) FRACTURE: ICD-10-CM

## 2019-09-17 DIAGNOSIS — Z98.890 S/P ORIF (OPEN REDUCTION INTERNAL FIXATION) FRACTURE: ICD-10-CM

## 2019-09-17 DIAGNOSIS — M25.552 PAIN OF LEFT HIP JOINT: Primary | ICD-10-CM

## 2019-09-17 ASSESSMENT — PATIENT HEALTH QUESTIONNAIRE - PHQ9: SUM OF ALL RESPONSES TO PHQ QUESTIONS 1-9: 3

## 2019-09-17 NOTE — LETTER
9/17/2019       RE: Noe Florence  [address] [facility] [address]     Dear Colleague,    Thank you for referring your patient, Noe Florence, to the [facility] at [facility]. Please see a copy of my visit note below.    CHIEF COMPLAINT:  Status post left hip IM nailing performed 03/14/2019.      HISTORY OF PRESENT ILLNESS:  Mr. Florence presents today for further followup in the company of his wife.  The patient reports to be doing extremely well from a hip point of view.  However, he reports to have some issues as apparently he fell and broke his right elbow.  He also had an episode where he collapsed yesterday secondary to fatigue after walking too much.  For the most part, he has been walking with a cane, although he now presents with a walker after yesterday's incident.      PHYSICAL EXAMINATION:  On today's visit, he presents with no pain whatsoever with range of motion of the left hip.  Presents with internal and external rotation symmetrical when compared to the opposite side.      RADIOGRAPHIC EVALUATION:  Two views of the left hip were obtained today which were significant for showing full consolidation of the fracture site.  Hardware is intact and in place.  Alignment is excellent.      ASSESSMENT:  Status post left hip fracture with IM nailing.      PLAN:  I discussed with the patient that he can proceed with activity as tolerated.  He has no restrictions.  He will follow up on a p.r.n. basis.  I encouraged him to continue working with physical therapy and to be extremely careful with future falls.      TT 15 minutes, CT 10 minutes.     Again, thank you for allowing me to participate in the care of your patient.      Sincerely,    Srikanth [facility] MD     None

## 2019-09-17 NOTE — NURSING NOTE
Reason For Visit:   Chief Complaint   Patient presents with     RECHECK     Open Reduction Internal Fixation Left Femur Intramedullar Nailing  DOS: 3/14/19         Patient had a fall May 22nd and broke his elbow. Patient reports that he hadn't been walking too much due to not being able to use the walker. He has been walking more normally for the last month or so.       Pain Assessment  Patient Currently in Pain: Seven Mckeon, ATC

## 2019-09-17 NOTE — PROGRESS NOTES
CHIEF COMPLAINT:  Status post left hip IM nailing performed 03/14/2019.      HISTORY OF PRESENT ILLNESS:  Mr. Florence presents today for further followup in the company of his wife.  The patient reports to be doing extremely well from a hip point of view.  However, he reports to have some issues as apparently he fell and broke his right elbow.  He also had an episode where he collapsed yesterday secondary to fatigue after walking too much.  For the most part, he has been walking with a cane, although he now presents with a walker after yesterday's incident.      PHYSICAL EXAMINATION:  On today's visit, he presents with no pain whatsoever with range of motion of the left hip.  Presents with internal and external rotation symmetrical when compared to the opposite side.      RADIOGRAPHIC EVALUATION:  Two views of the left hip were obtained today which were significant for showing full consolidation of the fracture site.  Hardware is intact and in place.  Alignment is excellent.      ASSESSMENT:  Status post left hip fracture with IM nailing.      PLAN:  I discussed with the patient that he can proceed with activity as tolerated.  He has no restrictions.  He will follow up on a p.r.n. basis.  I encouraged him to continue working with physical therapy and to be extremely careful with future falls.      TT 15 minutes, CT 10 minutes.

## 2019-09-23 DIAGNOSIS — I48.91 ATRIAL FIBRILLATION (H): ICD-10-CM

## 2019-09-23 LAB — INR PPP: 1.46 (ref 0.86–1.14)

## 2019-09-24 ENCOUNTER — ANTICOAGULATION THERAPY VISIT (OUTPATIENT)
Dept: ANTICOAGULATION | Facility: CLINIC | Age: 84
End: 2019-09-24

## 2019-09-24 DIAGNOSIS — I48.91 ATRIAL FIBRILLATION, UNSPECIFIED TYPE (H): ICD-10-CM

## 2019-09-24 DIAGNOSIS — I82.409 DEEP VEIN THROMBOSIS (DVT) (H): ICD-10-CM

## 2019-09-24 DIAGNOSIS — Z79.01 LONG TERM CURRENT USE OF ANTICOAGULANT THERAPY: ICD-10-CM

## 2019-09-24 NOTE — PROGRESS NOTES
ANTICOAGULATION FOLLOW-UP CLINIC VISIT    Patient Name:  Noe Florence  Date:  2019  Contact Type:  Telephone    SUBJECTIVE:         OBJECTIVE    INR   Date Value Ref Range Status   2019 1.46 (H) 0.86 - 1.14 Final       ASSESSMENT / PLAN  INR assessment SUB    Recheck INR In: 1 WEEK    INR Location Clinic      Anticoagulation Summary  As of 2019    INR goal:   1.5-2.5   TTR:   61.4 % (3.1 y)   INR used for dosin.46! (2019)   Warfarin maintenance plan:   2.5 mg (5 mg x 0.5) every Tue, Thu; 5 mg (5 mg x 1) all other days   Full warfarin instructions:   : 5 mg; Otherwise 2.5 mg every Tue, Thu; 5 mg all other days   Weekly warfarin total:   30 mg   Plan last modified:   Ciro Sharp RN (2019)   Next INR check:   10/1/2019   Priority:   INR   Target end date:   Indefinite    Indications    Atrial fibrillation (H) [I48.91] [I48.91]  Long-term (current) use of anticoagulants [Z79.01] [Z79.01]  Deep vein thrombosis (DVT) (H) [I82.409]             Anticoagulation Episode Summary     INR check location:       Preferred lab:       Send [facility] reminders to:    [facility]    Comments:   Patient contact number: [phone] Hx of Falls/Bleed  Can leave results with Rica (friend)  [facility] see's pt. 19 Restarted Warfarin due to DVT.       Anticoagulation Care Providers     Provider Role Specialty Phone number    Frida Desai MD [facility] Cardiology [phone]            See the Encounter Report to view Anticoagulation Flowsheet and Dosing Calendar (Go to Encounters tab in chart review, and find the Anticoagulation Therapy Visit)  Spoke with Je Abebe RN

## 2019-09-27 ENCOUNTER — THERAPY VISIT (OUTPATIENT)
Dept: PHYSICAL THERAPY | Facility: CLINIC | Age: 84
End: 2019-09-27
Payer: COMMERCIAL

## 2019-09-27 DIAGNOSIS — M62.81 GENERALIZED MUSCLE WEAKNESS: Primary | ICD-10-CM

## 2019-09-27 DIAGNOSIS — Z91.81 AT HIGH RISK FOR FALLS: ICD-10-CM

## 2019-09-27 DIAGNOSIS — R26.81 GAIT INSTABILITY: ICD-10-CM

## 2019-09-27 DIAGNOSIS — M21.372 LEFT FOOT DROP: ICD-10-CM

## 2019-09-27 NOTE — PROGRESS NOTES
"   09/27/19 0800   Quick Adds   Type of Visit Initial OP PT Evaluation   General Information   Start of Care Date 09/27/19   Referring Physician Roberto Sarmiento MD   Orders Evaluate and Treat as Indicated   Order Date 07/23/19   Medical Diagnosis Falls frequently (R29.6)   Onset of illness/injury or Date of Surgery 09/27/19   Surgical/Medical history reviewed Yes   Pertinent history of current problem (include personal factors and/or comorbidities that impact the POC) Patient underwent cancer surgery in February 2019; patient then fell on the ice and fractured his left hip- went to ARU for 2 weeks. Patient then fell going up stairs- fell backwards and broke his R elbow. Patient then spent another 2 months in TCU. Patient just finished HH PT/OT 2 weeks ago. Patient recently fell on 9/16; going up a curb step- son helped him to the ground.    Prior level of function comment Prior to February patient would occasionally use SEC for community mobility   Previous/Current Treatment Physical Therapy   Improvement after PT Significant  (HH PT)   Living environment House[facility]   Home/Community Accessibility Comments Patient and wife/son come to the [facility] to walk 3x/week. Patient has 14 stairs up to bedroom, but does not use at this time- sleeps on main levels. Patient has 3 stairs into home with 1HR; CGA from wife/son- uses SEC with HR to negotiate   Current Assistive Devices Front Wheeled Walker;Standard Cane   Assistive Devices Comments Patient uses WW as primary mode of mobility household and community. Patient has SEC that he uses with supervision.   Patient/Family Goals Statement \"To be able to walk with my cane. I would also like to play tennis.\" Patient's wife would like him to be able to go up/down stairs   Fall Risk Screen   Fall screen completed by PT   Have you fallen 2 or more times in the past year? Yes   Have you fallen and had an injury in the past year? Yes   Is patient a fall risk? Yes   Fall screen comments " Will assess TUG next session   Pain   Patient currently in pain No   Cognitive Status Examination   Orientation orientation to person, place and time   Level of Consciousness alert;confused   Follows Commands and Answers Questions 100% of the time;able to follow single-step instructions   Personal Safety and Judgment at risk behaviors demonstrated   Memory impaired   Cognitive Comment fall history, decreased insight into deficits (wanting to play tennis)   Posture   Posture Forward head position;Protracted shoulders;Kyphosis   Range of Motion (ROM)   ROM Comment B knee extension lacking 10 degrees   MMT: Hip, Rehab Eval   Hip Flexion - Left Side (3+/5) fair plus, left   Hip ABduction - Left Side (3-/5) fair minus, left   Hip Flexion - Right Side (3+/5) fair plus, right   Hip ABduction - Right Side (3-/5) fair minus, right   MMT: Knee, Rehab Eval   Knee Flexion - Left Side (4/5) good, left   Knee Extension - Left Side (4/5) good, left   Knee Flexion - Right Side (4/5) good, right   Knee Extension - Right Side (4/5) good, right   MMT: Ankle, Rehab Eval   Ankle Dorsiflexion - Left Side (1/5) trace, left   Ankle Plantarflexion - Left Side (2/5) poor, left   Ankle Dorsiflexion - Right Side (3+/5) fair plus, right   Ankle Plantarflexion - Right Side (2+/5) poor plus, right   Bed Mobility   Bed Mobility Comments Modified independent   Transfer Skills   Transfer Comments Modified independent with WW; CGA with SEC   Gait   Gait Comments Patient ambulates with WW at modified independent level; significant crouched posturing, decreased step length, paty, foot clearance demonstrated. 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   Planned Therapy Interventions   Planned Therapy Interventions balance training;gait training;joint mobilization;motor coordination training;neuromuscular  re-education;orthotic fitting/training;ROM;strengthening;stretching;transfer training;manual therapy   Clinical Impression   Criteria for Skilled Therapeutic Interventions Met yes, treatment indicated   PT Diagnosis imbalance   Influenced by the following impairments sensation, ROM, strength, balance, posture   Functional limitations due to impairments fall history, gait dysfunction- household and community mobility, stair negotiation dysfunction, impaired participation in IADLs/ADLs   Clinical Presentation Stable/Uncomplicated   Clinical Presentation Rationale personal factors, body systems involved, fall history   Clinical Decision Making (Complexity) Low complexity   Therapy Frequency 1 time/week   Predicted Duration of Therapy Intervention (days/wks) up to 90 days   Risk & Benefits of therapy have been explained Yes   Patient, Family & other staff in agreement with plan of care Yes   Clinical Impression Comments Patient is a 84 year old male who presents to OP PT with reports of imbalance with history of falls (resultant fractured L hip and R elbow). Patient demonstrates above listed impairments which are placing him at an increased risk for falls. Patient would benefit from skilled PT services for functional upgrading, fall risk reduction and improvement in participation of ADLs/IADLs. Patient has strong family support (wife/son) and is motivated.   Education Assessment   Preferred Learning Style Demonstration;Pictures/video   Barriers to Learning Cognitive   GOALS   PT Eval Goals 1;2;3   Goal 1   Goal Identifier Gait   Goal Description Patient will ambulated with SEC at least 150 feet, modifed independent, for safe household gait   Target Date 12/25/19   Goal 2   Goal Identifier Stairs   Goal Description Patient will negotiate 14 stairs with 1HR/SEC modified independent in order to safely negotiate up to bedroom of house   Target Date 12/25/19   Goal 3   Goal Identifier HEP   Goal Description Patient will be  independent in HEP for maintenance of therapy gains at d/c   Target Date 12/25/19   Total Evaluation Time   PT Eval, Low Complexity Minutes (14469) 30

## 2019-09-27 NOTE — DISCHARGE INSTRUCTIONS
9/27/19    - Walk 3x/week with your walker: focus on posture  - Perform 7 stairs with cane and handrail 3x/week (non-walk days). Make sure your wife or son are with you.  - Perform sit to stands from chair using your left arm only, 5 times slowly. Daily   Breath sounds clear and equal bilaterally.

## 2019-10-02 ENCOUNTER — HEALTH MAINTENANCE LETTER (OUTPATIENT)
Age: 84
End: 2019-10-02

## 2019-10-03 DIAGNOSIS — I48.91 ATRIAL FIBRILLATION (H): ICD-10-CM

## 2019-10-03 LAB — INR PPP: 1.6 (ref 0.86–1.14)

## 2019-10-04 ENCOUNTER — ANTICOAGULATION THERAPY VISIT (OUTPATIENT)
Dept: ANTICOAGULATION | Facility: CLINIC | Age: 84
End: 2019-10-04

## 2019-10-04 DIAGNOSIS — I48.91 ATRIAL FIBRILLATION, UNSPECIFIED TYPE (H): ICD-10-CM

## 2019-10-04 DIAGNOSIS — Z79.01 LONG TERM CURRENT USE OF ANTICOAGULANT THERAPY: ICD-10-CM

## 2019-10-04 DIAGNOSIS — I82.409 DEEP VEIN THROMBOSIS (DVT) (H): ICD-10-CM

## 2019-10-04 NOTE — PROGRESS NOTES
ANTICOAGULATION FOLLOW-UP CLINIC VISIT    Patient Name:  Noe Florence  Date:  10/4/2019  Contact Type:  Telephone    SUBJECTIVE:  Patient Findings     Comments:   Rica reports Sha has not had changes in health, diet, medications.        Clinical Outcomes     Comments:   Rica Dalton has not had changes in health, diet, medications.           OBJECTIVE    INR   Date Value Ref Range Status   10/03/2019 1.60 (H) 0.86 - 1.14 Final       ASSESSMENT / PLAN  INR assessment THER    Recheck INR In: 10 DAYS    INR Location Clinic      Anticoagulation Summary  As of 10/4/2019    INR goal:   1.5-2.5   TTR:   61.5 % (3.2 y)   INR used for dosin.60 (10/3/2019)   Warfarin maintenance plan:   2.5 mg (5 mg x 0.5) every Thu; 5 mg (5 mg x 1) all other days   Full warfarin instructions:   2.5 mg every Thu; 5 mg all other days   Weekly warfarin total:   32.5 mg   Plan last modified:   Danya Edwards RN (10/4/2019)   Next INR check:   10/14/2019   Priority:   INR   Target end date:   Indefinite    Indications    Atrial fibrillation (H) [I48.91] [I48.91]  Long-term (current) use of anticoagulants [Z79.01] [Z79.01]  Deep vein thrombosis (DVT) (H) [I82.409]             Anticoagulation Episode Summary     INR check location:       Preferred lab:       Send INR reminders to:   [facility]    Comments:   Patient contact number: [phone] Hx of Falls/Bleed  Can leave results with Rica (friend)  [facility] Restarted Warfarin due to DVT.       Anticoagulation Care Providers     Provider Role Specialty Phone number    Frida Desai MD Responsible Cardiology [phone]            See the Encounter Report to view Anticoagulation Flowsheet and Dosing Calendar (Go to Encounters tab in chart review, and find the Anticoagulation Therapy Visit)    Spoke with Rica.  Updated calendar with dosing she reported.  Will recommend same warfarin dose as last week:  2.5 mg Th and 5 mg all  other days of the week.    Danya Edwards RN

## 2019-10-07 ENCOUNTER — ANCILLARY PROCEDURE (OUTPATIENT)
Dept: CARDIOLOGY | Facility: CLINIC | Age: 84
End: 2019-10-07
Attending: INTERNAL MEDICINE
Payer: COMMERCIAL

## 2019-10-07 VITALS
WEIGHT: 154.7 LBS | HEIGHT: 67 IN | OXYGEN SATURATION: 97 % | BODY MASS INDEX: 24.28 KG/M2 | HEART RATE: 67 BPM | SYSTOLIC BLOOD PRESSURE: 136 MMHG | DIASTOLIC BLOOD PRESSURE: 82 MMHG

## 2019-10-07 DIAGNOSIS — I48.0 PAROXYSMAL ATRIAL FIBRILLATION (H): Primary | ICD-10-CM

## 2019-10-07 DIAGNOSIS — I47.20 VENTRICULAR TACHYARRHYTHMIA (H): ICD-10-CM

## 2019-10-07 DIAGNOSIS — Z95.810 ICD (IMPLANTABLE CARDIOVERTER-DEFIBRILLATOR) IN PLACE: ICD-10-CM

## 2019-10-07 DIAGNOSIS — R29.6 FALLS FREQUENTLY: ICD-10-CM

## 2019-10-07 PROCEDURE — G0463 HOSPITAL OUTPT CLINIC VISIT: HCPCS | Mod: ZF

## 2019-10-07 PROCEDURE — 99214 OFFICE O/P EST MOD 30 MIN: CPT | Mod: ZP | Performed by: INTERNAL MEDICINE

## 2019-10-07 ASSESSMENT — MIFFLIN-ST. JEOR: SCORE: 1350.34

## 2019-10-07 ASSESSMENT — PAIN SCALES - GENERAL: PAINLEVEL: NO PAIN (0)

## 2019-10-07 NOTE — PATIENT INSTRUCTIONS
You were seen in the Electrophysiology today by: Dr. Deedee Baird    Plan:     Follow up visit: 6 months with Jaye Alexander NP and device check prior      Your Care Team:  EP Cardiology   Telephone Number     Lashawn Cool RN [phone]     For scheduling appts or procedures:    Ifeoma Osuna   [phone]   For the Device Clinic (Pacemakers, ICDs, Loop Recorders)    During business hours: [phone]  After business hours:   [phone]- select option 4 and ask for job code [phone].       Cardiovascular Clinic:   [address] [place] [address]. [place] [address]      As always, Thank you for trusting us with your health care needs!

## 2019-10-07 NOTE — PROGRESS NOTES
HPI: Purpose of visit: Patient comes back for follow-up of paroxysmal atrial fibrillation.     The patient has a past medical history significant for  HTN, HLD, CKD3, CAD s/p CABG x2, DESx2, polymorphic VT s/p ICD placement (5/2012, gen change 12/2018), and AF/AFL s/p CTI (6/2014) and right atrial appendage atrial tachycardia ablation (9/2014). His ICD reached REBECCA and he underwent an ICD gen change on 12/11/2018.    Patient's last visit with me was in July 2019.  At that time, patient's warfarin had been discontinued because of multiple falls.  We discussed the possibility of a watchman procedure but decided to defer and to focus on rehabilitation to increase his gait stability.  In the meantime, patient developed a DVT and was restarted on warfarin with a goal INR of 1.5-2.5.    Patient continues with rehabilitation and is steadily making progress in terms of strength and gait stability.  He denies any symptoms of palpitations, rapid heartbeat sensation, frequent lightheadedness, presyncope or syncope.  His ICD was interrogated today which did not show any significant unexpected arrhythmias.    PAST MEDICAL HISTORY:  Past Medical History:   Diagnosis Date     Advanced open-angle glaucoma      Atrial fibrillation (H)      CKD (chronic kidney disease), stage III (H) 2005     Colon cancer (H)     Stage II-B colon cancer     Coronary artery disease     s/p CABG x 2, JEREMY x 2     Diverticulitis      Hyperlipidemia      Hypertension      Ischemic cardiomyopathy      MGUS (monoclonal gammopathy of unknown significance)      Nonsenile cataract     BE     Osteoporosis     left hip fracture     Polymorphic ventricular tachycardia (H)      Polymyalgia rheumatica (H)      PVD (posterior vitreous detachment), both eyes 2005     S/P ablation of atrial flutter 6/20/14    CTI     Stented coronary artery      SVT (supraventricular tachycardia) (H)     PPM/AICD for NSVT     Upper leg DVT (deep venous thromboembolism), chronic (H)      Left     Weight loss, unintentional 2017    15 lb in 4 months       CURRENT MEDICATIONS:  Current Outpatient Medications   Medication Sig Dispense Refill     alendronate (FOSAMAX) 70 MG tablet Take 1 tablet (70 mg) by mouth every 7 days Take with a full glass of water and do not eat or lay down for 30 minutes 12 tablet 3     ARIPiprazole (ABILIFY) 2 MG tablet Take 2 mg by mouth daily       atorvastatin (LIPITOR) 40 MG tablet Take 0.5 tablets (20 mg) by mouth daily as directed. 90 tablet 0     calcium carbonate 500 mg, elemental, (OSCAL;OYSTER SHELL CALCIUM) 500 MG tablet Take 1 tablet (500 mg) by mouth 2 times daily 180 tablet 3     cholecalciferol 1000 units TABS Take 1,000 Units by mouth daily        Cyanocobalamin (VITAMIN B-12 CR PO)        ferrous sulfate (FE TABS) 325 (65 Fe) MG EC tablet Take 325 mg by mouth daily       metoprolol tartrate (LOPRESSOR) 25 MG tablet Take 12.5 mg by mouth 2 times daily  180 tablet 3     mirtazapine (REMERON) 15 MG tablet Take 15 mg by mouth At Bedtime.       Multiple Vitamins-Minerals (MULTIVITAMIN ADULT PO)        sertraline (ZOLOFT) 100 MG tablet Take 150 mg by mouth every evening        tamsulosin (FLOMAX) 0.4 MG capsule Take 2 capsules (0.8 mg) by mouth daily 180 capsule 3     warfarin (COUMADIN) 5 MG tablet Take 1 tablet (5 mg) by mouth daily Or as directed by Coumadin Clinic 60 tablet 0     acetaminophen (TYLENOL) 500 MG tablet Take 2 tablets (1,000 mg) by mouth 3 times daily as needed for mild pain (Patient not taking: Reported on 8/23/2019)         PAST SURGICAL HISTORY:  Past Surgical History:   Procedure Laterality Date     C CABG, ARTERY-VEIN, FOUR  2006    LIMA-LAD, SVG-Rt PDA, SVG-OM2, SVG-Diag 1     C CABG, VEIN, SINGLE  1994    1-vessel CABG, SVG->PDRCA      CATARACT IOL, RT/LT Bilateral      COLONOSCOPY  3/13/2014    Procedure: COMBINED COLONOSCOPY, SINGLE BIOPSY/POLYPECTOMY BY BIOPSY;;  Surgeon: Mary Gerber MD;  Location:  GI     COLONOSCOPY  N/A 6/22/2015    Procedure: COLONOSCOPY;  Surgeon: Marilin Newman MD;  [facility]: [facility]     COLONOSCOPY N/A 11/7/2018    Procedure: COMBINED COLONOSCOPY, SINGLE OR MULTIPLE BIOPSY/POLYPECTOMY BY BIOPSY;  Surgeon: Roberto Esteban MD;  [facility]     EP ICD GENERATOR CHANGE DUAL N/A 12/11/2018    Procedure: EP ICD Generator Change Dual;  Surgeon: Deedee Baird MD;  Location:  HEART CARDIAC CATH LAB     H ABLATION ATRIAL FLUTTER       HEART CATH DRUG ELUTING STENT PLACEMENT  4/19/2012    JEREMY x 2 to LCx     IMPLANT IMPLANTABLE CARDIOVERTER DEFIBRILLATOR  5-    ppm/aicd     KNEE SURGERY      right and left knee surgeries     LAPAROSCOPIC ASSISTED COLECTOMY Right 2/1/2019    Procedure: Laparoscopic Converted to Open Transverse Colectomy with Lysis of Adhesions;  Surgeon: Chanelle Guzmán MD;  [facility]     LAPAROSCOPIC ASSISTED COLECTOMY LEFT (DESCENDING)  4/8/2014    Procedure: LAPAROSCOPIC ASSISTED COLECTOMY LEFT (DESCENDING);  Hand assisted Laparoscopic Sigmoid Colectomy , Laparoscopic mobilization of spleenic flexure *Latex Allergy*Anesthesia General with Block;  Surgeon: Chanelle Guzmán MD;  [facility]     OPEN REDUCTION INTERNAL FIXATION RODDING INTRAMEDULLAR FEMUR FRACTURE TABLE Left 3/14/2019    Procedure: Open Reduction Internal Fixation Left Femur Intramedullar Nailing;  Surgeon: Srikanth Avalos MD;  [facility]     REPAIR VALVE MITRAL  2006     30-mm Medtronic Julian ring      SELECTIVE LASER TRABECULOPLASTY (SLT) OD (RIGHT EYE)  4/10, 1/12,+1/9/13    RE     SELECTIVE LASER TRABECULOPLASTY (SLT) OS (LEFT EYE)  5/2012     SHOULDER SURGERY      right rotator cuff       ALLERGIES:     Allergies   Allergen Reactions     Latex Rash     Rash       FAMILY HISTORY:  - Premature coronary artery disease  - Atrial fibrillation  - Sudden cardiac death     SOCIAL HISTORY:  Social History     Tobacco Use     Smoking status: Former Smoker     Types:  "Cigarettes, Cigars     Last attempt to quit: 1968     Years since quittin.8     Smokeless tobacco: Never Used   Substance Use Topics     Alcohol use: Yes     Alcohol/week: 0.0 standard drinks     Comment: 2-3 drinks twice weekly     Drug use: No       ROS:   Constitutional: No fever, chills, or sweats. Weight stable.   ENT: No visual disturbance, ear ache, epistaxis, sore throat.   Cardiovascular: As per HPI.   Respiratory: No cough, hemoptysis.    GI: No nausea, vomiting, hematemesis, melena, or hematochezia.   : No hematuria.   Integument: Negative.   Psychiatric: Negative.   Hematologic:  Easy bruising, no easy bleeding.  Neuro: Negative.   Endocrinology: No significant heat or cold intolerance   Musculoskeletal: No myalgia.    Exam:  /82 (BP Location: Left arm, Patient Position: Chair, Cuff Size: Adult Regular)   Pulse 67   Ht 1.702 m (5' 7\")   Wt 70.2 kg (154 lb 11.2 oz)   SpO2 97%   BMI 24.23 kg/m    GENERAL APPEARANCE: healthy, alert and no distress  HEENT: no icterus, no xanthelasmas, normal pupil size and reaction, normal palate, mucosa moist, no central cyanosis  NECK: no adenopathy, no asymmetry, masses, or scars, thyroid normal to palpation and no bruits, JVP not elevated  RESPIRATORY: lungs clear to auscultation - no rales, rhonchi or wheezes, no use of accessory muscles, no retractions, respirations are unlabored, normal respiratory rate  CARDIOVASCULAR: regular rhythm, normal S1 with physiologic split S2, no S3 or S4 and no murmur, click or rub, precordium quiet with normal PMI.  ABDOMEN: soft, non tender, without hepatosplenomegaly, no masses palpable, bowel sounds normal, aorta not enlarged by palpation, no abdominal bruits  EXTREMITIES: peripheral pulses normal, no edema, no bruits  NEURO: alert and oriented to person/place/time, normal speech, gait and affect  VASC: Radial, femoral, dorsalis pedis and posterior tibialis pulses are normal in volumes and symmetric bilaterally. " No bruits are heard.  SKIN: no ecchymoses, no rashes    Labs:  CBC RESULTS:   Lab Results   Component Value Date    WBC 8.0 08/12/2019    RBC 3.55 (L) 08/12/2019    HGB 10.6 (L) 08/12/2019    HCT 34.6 (L) 08/12/2019    MCV 98 08/12/2019    MCH 29.9 08/12/2019    MCHC 30.6 (L) 08/12/2019    RDW 14.1 08/12/2019     08/12/2019       BMP RESULTS:  Lab Results   Component Value Date     08/12/2019    POTASSIUM 4.0 08/12/2019    CHLORIDE 110 (H) 08/12/2019    CO2 25 08/12/2019    ANIONGAP 6 08/12/2019     (H) 08/12/2019    BUN 59 (H) 08/12/2019    CR 1.56 (H) 08/12/2019    GFRESTIMATED 41 (L) 08/12/2019    GFRESTBLACK 50 (L) 08/12/2019    KEITH 8.9 08/12/2019        INR RESULTS:  Lab Results   Component Value Date    INR 1.60 (H) 10/03/2019    INR 1.46 (H) 09/23/2019    INR 1.44 (H) 09/13/2019    INR 1.24 (H) 09/06/2019       Procedures:      Assessment and Plan:     Paroxysmal atrial fibrillation  Frequent falls  Status post ICD  Recent DVT    I discussed with patient and his wife this complex medical situation.  Because of the recent DVT, patient was restarted on warfarin.  Given this current situation, we will continue to defer the question of watchman procedure since he is already on warfarin.    Patient will return to the heart rhythm clinic in 6 months to see Jaye Alexander. All questions and concerns were addressed and patient is happy with plan.            CC  Patient Care Team:  Roberto Sarmiento MD as PCP - General (Internal Medicine)  Francisco Gilliam MD as MD (Oncology)  Omero Srinivasan MD as MD (Ophthalmology)  Hay Arnett MD as MD (Internal Medicine)  NIKHIL Wheeler MD as MD (Dermatology)  Deedee Baird MD as MD (Cardiology)  Bhavana Mckeon MD as MD (Internal Medicine)  Naveed Ram MD as Resident  Guzman Boudreaux DPM (Podiatry)  Joshua Centeno MD as MD (Family Practice)  Brett Petersen MD as MD (Dermapathology)  Clarice Magallanes  Lily, MARIA LUISA CNP as Nurse Practitioner (Nurse Practitioner - Family)  Lashawn Jackson MD as MD (Dermatology)  Frida Desai MD as MD (Cardiology)  Marci Palmer, BYRON as Nurse Coordinator (Oncology)  Lashawn Cool, RN as Specialty Care Coordinator (Cardiology)  Jaye Alexander APRN CNP as Nurse Practitioner (Nurse Practitioner - Family)  Alona Lowe CNP as Nurse Practitioner (Nurse Practitioner)  Nusrat Mayo, BYRON as Specialty Care Coordinator (Urology)  Myesha Arreola, MARIA LUISA CNP as Assigned PCP  CYNTHIA CARRION

## 2019-10-07 NOTE — PATIENT INSTRUCTIONS
It was a pleasure to see you in clinic today.  Please do not hesitate to call with any questions or concerns.  You are scheduled for a remote transmission on 1/8/2020.  We look forward to seeing you in clinic at your next device check in 6 months.    Juanito Woodard, RN  Electrophysiology Nurse Clinician  [facility]    During Business Hours Please Call:  [phone]  After Hours Please Call:  [phone] - select option #4 and ask for job code [phone]

## 2019-10-07 NOTE — NURSING NOTE
Chief Complaint   Patient presents with     Follow Up     3 mo follow-up paroxysmal atrial fibrillation. Falls, recent DVT. On warfarin with INR goal of 1.5-2.5.     Vitals were taken and medications were reconciled.     Maryam Shafer  2:12 PM

## 2019-10-07 NOTE — LETTER
10/7/2019      RE: Noe Florence  [address] [place] [place] [address]     Dear Colleague,    Thank you for the opportunity to participate in the care of your patient, Noe Florence, at the [facility]. Please see a copy of my visit note below.    HPI: Purpose of visit: Patient comes back for follow-up of paroxysmal atrial fibrillation.     The patient has a past medical history significant for  HTN, HLD, CKD3, CAD s/p CABG x2, DESx2, polymorphic VT s/p ICD placement (5/2012, gen change 12/2018), and AF/AFL s/p CTI (6/2014) and right atrial appendage atrial tachycardia ablation (9/2014). His ICD reached REBECCA and he underwent an ICD gen change on 12/11/2018.    Patient's last visit with me was in July 2019.  At that time, patient's warfarin had been discontinued because of multiple falls.  We discussed the possibility of a watchman procedure but decided to defer and to focus on rehabilitation to increase his gait stability.  In the meantime, patient developed a DVT and was restarted on warfarin with a goal INR of 1.5-2.5.    Patient continues with rehabilitation and is steadily making progress in terms of strength and gait stability.  He denies any symptoms of palpitations, rapid heartbeat sensation, frequent lightheadedness, presyncope or syncope.  His ICD was interrogated today which did not show any significant unexpected arrhythmias.    PAST MEDICAL HISTORY:  Past Medical History:   Diagnosis Date     Advanced open-angle glaucoma      Atrial fibrillation (H)      CKD (chronic kidney disease), stage III (H) 2005     Colon cancer (H)     Stage II-B colon cancer     Coronary artery disease     s/p CABG x 2, JEREMY x 2     Diverticulitis      Hyperlipidemia      Hypertension      Ischemic cardiomyopathy      MGUS (monoclonal gammopathy of unknown significance)      Nonsenile cataract     BE     Osteoporosis     left hip fracture     Polymorphic  ventricular tachycardia (H)      Polymyalgia rheumatica (H)      PVD (posterior vitreous detachment), both eyes 2005     S/P ablation of atrial flutter 6/20/14    CTI     Stented coronary artery      SVT (supraventricular tachycardia) (H)     PPM/AICD for NSVT     Upper leg DVT (deep venous thromboembolism), chronic (H)     Left     Weight loss, unintentional 2017    15 lb in 4 months       CURRENT MEDICATIONS:  Current Outpatient Medications   Medication Sig Dispense Refill     alendronate (FOSAMAX) 70 MG tablet Take 1 tablet (70 mg) by mouth every 7 days Take with a full glass of water and do not eat or lay down for 30 minutes 12 tablet 3     ARIPiprazole (ABILIFY) 2 MG tablet Take 2 mg by mouth daily       atorvastatin (LIPITOR) 40 MG tablet Take 0.5 tablets (20 mg) by mouth daily as directed. 90 tablet 0     calcium carbonate 500 mg, elemental, (OSCAL;OYSTER SHELL CALCIUM) 500 MG tablet Take 1 tablet (500 mg) by mouth 2 times daily 180 tablet 3     cholecalciferol 1000 units TABS Take 1,000 Units by mouth daily        Cyanocobalamin (VITAMIN B-12 CR PO)        ferrous sulfate (FE TABS) 325 (65 Fe) MG EC tablet Take 325 mg by mouth daily       metoprolol tartrate (LOPRESSOR) 25 MG tablet Take 12.5 mg by mouth 2 times daily  180 tablet 3     mirtazapine (REMERON) 15 MG tablet Take 15 mg by mouth At Bedtime.       Multiple Vitamins-Minerals (MULTIVITAMIN ADULT PO)        sertraline (ZOLOFT) 100 MG tablet Take 150 mg by mouth every evening        tamsulosin (FLOMAX) 0.4 MG capsule Take 2 capsules (0.8 mg) by mouth daily 180 capsule 3     warfarin (COUMADIN) 5 MG tablet Take 1 tablet (5 mg) by mouth daily Or as directed by Coumadin Clinic 60 tablet 0     acetaminophen (TYLENOL) 500 MG tablet Take 2 tablets (1,000 mg) by mouth 3 times daily as needed for mild pain (Patient not taking: Reported on 8/23/2019)         PAST SURGICAL HISTORY:  Past Surgical History:   Procedure Laterality Date     C CABG, ARTERY-VEIN, FOUR   2006    LIMA-LAD, SVG-Rt PDA, SVG-OM2, SVG-Diag 1     C CABG, VEIN, SINGLE  1994    1-vessel CABG, SVG->PDRCA      CATARACT IOL, RT/LT Bilateral      COLONOSCOPY  3/13/2014    Procedure: COMBINED COLONOSCOPY, SINGLE BIOPSY/POLYPECTOMY BY BIOPSY;;  Surgeon: Mary Gerber MD;  [facility]     COLONOSCOPY N/A 6/22/2015    Procedure: COLONOSCOPY;  Surgeon: Marilin Newman MD;  [facility]: [facility]     COLONOSCOPY N/A 11/7/2018    Procedure: COMBINED COLONOSCOPY, SINGLE OR MULTIPLE BIOPSY/POLYPECTOMY BY BIOPSY;  Surgeon: Roberto Esteban MD;  [facility]     EP ICD GENERATOR CHANGE DUAL N/A 12/11/2018    Procedure: EP ICD Generator Change Dual;  Surgeon: Deedee Baird MD;  Location:  HEART CARDIAC CATH LAB     H ABLATION ATRIAL FLUTTER       HEART CATH DRUG ELUTING STENT PLACEMENT  4/19/2012    JEREMY x 2 to LCx     IMPLANT IMPLANTABLE CARDIOVERTER DEFIBRILLATOR  5-    ppm/aicd     KNEE SURGERY      right and left knee surgeries     LAPAROSCOPIC ASSISTED COLECTOMY Right 2/1/2019    Procedure: Laparoscopic Converted to Open Transverse Colectomy with Lysis of Adhesions;  Surgeon: Chanelle Guzmán MD;  [facility]     LAPAROSCOPIC ASSISTED COLECTOMY LEFT (DESCENDING)  4/8/2014    Procedure: LAPAROSCOPIC ASSISTED COLECTOMY LEFT (DESCENDING);  Hand assisted Laparoscopic Sigmoid Colectomy , Laparoscopic mobilization of spleenic flexure *Latex Allergy*Anesthesia General with Block;  Surgeon: Chanelle Guzmán MD;  [facility]     OPEN REDUCTION INTERNAL FIXATION RODDING INTRAMEDULLAR FEMUR FRACTURE TABLE Left 3/14/2019    Procedure: Open Reduction Internal Fixation Left Femur Intramedullar Nailing;  Surgeon: Srikanth Avalos MD;  [facility]     REPAIR VALVE MITRAL  2006     30-mm Medtronic Julian ring      SELECTIVE LASER TRABECULOPLASTY (SLT) OD (RIGHT EYE)  4/10, 1/12,+1/9/13    RE     SELECTIVE LASER TRABECULOPLASTY (SLT) OS (LEFT EYE)  5/2012      "SHOULDER SURGERY      right rotator cuff       ALLERGIES:     Allergies   Allergen Reactions     Latex Rash     Rash       FAMILY HISTORY:  - Premature coronary artery disease  - Atrial fibrillation  - Sudden cardiac death     SOCIAL HISTORY:  Social History     Tobacco Use     Smoking status: Former Smoker     Types: Cigarettes, Cigars     Last attempt to quit: 1968     Years since quittin.8     Smokeless tobacco: Never Used   Substance Use Topics     Alcohol use: Yes     Alcohol/week: 0.0 standard drinks     Comment: 2-3 drinks twice weekly     Drug use: No       ROS:   Constitutional: No fever, chills, or sweats. Weight stable.   ENT: No visual disturbance, ear ache, epistaxis, sore throat.   Cardiovascular: As per HPI.   Respiratory: No cough, hemoptysis.    GI: No nausea, vomiting, hematemesis, melena, or hematochezia.   : No hematuria.   Integument: Negative.   Psychiatric: Negative.   Hematologic:  Easy bruising, no easy bleeding.  Neuro: Negative.   Endocrinology: No significant heat or cold intolerance   Musculoskeletal: No myalgia.    Exam:  /82 (BP Location: Left arm, Patient Position: Chair, Cuff Size: Adult Regular)   Pulse 67   Ht 1.702 m (5' 7\")   Wt 70.2 kg (154 lb 11.2 oz)   SpO2 97%   BMI 24.23 kg/m     GENERAL APPEARANCE: healthy, alert and no distress  HEENT: no icterus, no xanthelasmas, normal pupil size and reaction, normal palate, mucosa moist, no central cyanosis  NECK: no adenopathy, no asymmetry, masses, or scars, thyroid normal to palpation and no bruits, JVP not elevated  RESPIRATORY: lungs clear to auscultation - no rales, rhonchi or wheezes, no use of accessory muscles, no retractions, respirations are unlabored, normal respiratory rate  CARDIOVASCULAR: regular rhythm, normal S1 with physiologic split S2, no S3 or S4 and no murmur, click or rub, precordium quiet with normal PMI.  ABDOMEN: soft, non tender, without hepatosplenomegaly, no masses palpable, bowel " sounds normal, aorta not enlarged by palpation, no abdominal bruits  EXTREMITIES: peripheral pulses normal, no edema, no bruits  NEURO: alert and oriented to person/place/time, normal speech, gait and affect  VASC: Radial, femoral, dorsalis pedis and posterior tibialis pulses are normal in volumes and symmetric bilaterally. No bruits are heard.  SKIN: no ecchymoses, no rashes    Labs:  CBC RESULTS:   Lab Results   Component Value Date    WBC 8.0 08/12/2019    RBC 3.55 (L) 08/12/2019    HGB 10.6 (L) 08/12/2019    HCT 34.6 (L) 08/12/2019    MCV 98 08/12/2019    MCH 29.9 08/12/2019    MCHC 30.6 (L) 08/12/2019    RDW 14.1 08/12/2019     08/12/2019       BMP RESULTS:  Lab Results   Component Value Date     08/12/2019    POTASSIUM 4.0 08/12/2019    CHLORIDE 110 (H) 08/12/2019    CO2 25 08/12/2019    ANIONGAP 6 08/12/2019     (H) 08/12/2019    BUN 59 (H) 08/12/2019    CR 1.56 (H) 08/12/2019    GFRESTIMATED 41 (L) 08/12/2019    GFRESTBLACK 50 (L) 08/12/2019    KEITH 8.9 08/12/2019        INR RESULTS:  Lab Results   Component Value Date    INR 1.60 (H) 10/03/2019    INR 1.46 (H) 09/23/2019    INR 1.44 (H) 09/13/2019    INR 1.24 (H) 09/06/2019   Procedures:    Assessment and Plan:     Paroxysmal atrial fibrillation  Frequent falls  Status post ICD  Recent DVT    I discussed with patient and his wife this complex medical situation.  Because of the recent DVT, patient was restarted on warfarin.  Given this current situation, we will continue to defer the question of watchman procedure since he is already on warfarin.    Patient will return to the heart rhythm clinic in 6 months to see Jaye Alexander. All questions and concerns were addressed and patient is happy with plan.    CC  Patient Care Team:  Roberto Sarmiento MD as PCP - General (Internal Medicine)  Francisco Gilliam MD as MD (Oncology)  Omero Srinivasan MD as MD (Ophthalmology)  Hay Arnett MD as MD (Internal Medicine)  Liam,  NIKHIL Lopez MD as MD (Dermatology)  Cynthia Carrion MD as MD (Cardiology)  Bhavana Mckeon MD as MD (Internal Medicine)  Naveed Ram MD as Resident  Guzman Boudreaux DPM (Podiatry)  Joshua Centeno MD as MD (Family Practice)  Brett Petersen MD as MD (Dermapathology)  Clarice Magallanes APRN CNP as Nurse Practitioner (Nurse Practitioner - Family)  Lashawn Jackson MD as MD (Dermatology)  Frida Desai MD as MD (Cardiology)  Marci Palmer, RN as Nurse Coordinator (Oncology)  Lashawn Cool, BYRON as Specialty Care Coordinator (Cardiology)  Jaye Alexander APRN CNP as Nurse Practitioner (Nurse Practitioner - Family)  Alona Lowe CNP as Nurse Practitioner (Nurse Practitioner)  Nusrat Mayo, BYRON as Specialty Care Coordinator (Urology)  Myesha Arreola APRN CNP as Assigned PCP  CYNTHIA CARRION    Please do not hesitate to contact me if you have any questions/concerns.     Sincerely,     Cynthia Carrion MD

## 2019-10-08 ENCOUNTER — ANTICOAGULATION THERAPY VISIT (OUTPATIENT)
Dept: ANTICOAGULATION | Facility: CLINIC | Age: 84
End: 2019-10-08

## 2019-10-08 DIAGNOSIS — Z79.01 LONG TERM CURRENT USE OF ANTICOAGULANT THERAPY: ICD-10-CM

## 2019-10-08 DIAGNOSIS — I82.409 DEEP VEIN THROMBOSIS (DVT) (H): ICD-10-CM

## 2019-10-08 DIAGNOSIS — I48.91 ATRIAL FIBRILLATION, UNSPECIFIED TYPE (H): ICD-10-CM

## 2019-10-08 DIAGNOSIS — R68.89 IMPAIRED FUNCTION OF UPPER EXTREMITY: Primary | ICD-10-CM

## 2019-10-08 NOTE — PROGRESS NOTES
Spoke with patient's spouse Rica.  Patient will continue with warfarin for now given the fact patient has a recent DVT.

## 2019-10-10 LAB
MDC_IDC_EPISODE_DTM: NORMAL
MDC_IDC_EPISODE_DTM: NORMAL
MDC_IDC_EPISODE_DURATION: 1 S
MDC_IDC_EPISODE_DURATION: 1 S
MDC_IDC_EPISODE_ID: 62
MDC_IDC_EPISODE_ID: 63
MDC_IDC_EPISODE_TYPE: NORMAL
MDC_IDC_EPISODE_TYPE: NORMAL
MDC_IDC_LEAD_IMPLANT_DT: NORMAL
MDC_IDC_LEAD_IMPLANT_DT: NORMAL
MDC_IDC_LEAD_LOCATION: NORMAL
MDC_IDC_LEAD_LOCATION: NORMAL
MDC_IDC_LEAD_LOCATION_DETAIL_1: NORMAL
MDC_IDC_LEAD_LOCATION_DETAIL_1: NORMAL
MDC_IDC_LEAD_MFG: NORMAL
MDC_IDC_LEAD_MFG: NORMAL
MDC_IDC_LEAD_MODEL: NORMAL
MDC_IDC_LEAD_MODEL: NORMAL
MDC_IDC_LEAD_POLARITY_TYPE: NORMAL
MDC_IDC_LEAD_POLARITY_TYPE: NORMAL
MDC_IDC_LEAD_SERIAL: NORMAL
MDC_IDC_LEAD_SERIAL: NORMAL
MDC_IDC_MSMT_BATTERY_DTM: NORMAL
MDC_IDC_MSMT_BATTERY_REMAINING_LONGEVITY: 113 MO
MDC_IDC_MSMT_BATTERY_RRT_TRIGGER: 2.73
MDC_IDC_MSMT_BATTERY_STATUS: NORMAL
MDC_IDC_MSMT_BATTERY_VOLTAGE: 3.02 V
MDC_IDC_MSMT_CAP_CHARGE_DTM: NORMAL
MDC_IDC_MSMT_CAP_CHARGE_ENERGY: 18 J
MDC_IDC_MSMT_CAP_CHARGE_TIME: 3.99
MDC_IDC_MSMT_CAP_CHARGE_TYPE: NORMAL
MDC_IDC_MSMT_LEADCHNL_RA_IMPEDANCE_VALUE: 475 OHM
MDC_IDC_MSMT_LEADCHNL_RA_PACING_THRESHOLD_AMPLITUDE: 0.5 V
MDC_IDC_MSMT_LEADCHNL_RA_PACING_THRESHOLD_PULSEWIDTH: 0.4 MS
MDC_IDC_MSMT_LEADCHNL_RA_SENSING_INTR_AMPL: 0.88 MV
MDC_IDC_MSMT_LEADCHNL_RV_IMPEDANCE_VALUE: 304 OHM
MDC_IDC_MSMT_LEADCHNL_RV_IMPEDANCE_VALUE: 399 OHM
MDC_IDC_MSMT_LEADCHNL_RV_PACING_THRESHOLD_AMPLITUDE: 1 V
MDC_IDC_MSMT_LEADCHNL_RV_PACING_THRESHOLD_PULSEWIDTH: 0.4 MS
MDC_IDC_MSMT_LEADCHNL_RV_SENSING_INTR_AMPL: 9.62 MV
MDC_IDC_PG_IMPLANT_DTM: NORMAL
MDC_IDC_PG_MFG: NORMAL
MDC_IDC_PG_MODEL: NORMAL
MDC_IDC_PG_SERIAL: NORMAL
MDC_IDC_PG_TYPE: NORMAL
MDC_IDC_SESS_CLINIC_NAME: NORMAL
MDC_IDC_SESS_DTM: NORMAL
MDC_IDC_SESS_TYPE: NORMAL
MDC_IDC_SET_BRADY_AT_MODE_SWITCH_RATE: 171 {BEATS}/MIN
MDC_IDC_SET_BRADY_HYSTRATE: NORMAL
MDC_IDC_SET_BRADY_LOWRATE: 60 {BEATS}/MIN
MDC_IDC_SET_BRADY_MAX_SENSOR_RATE: 130 {BEATS}/MIN
MDC_IDC_SET_BRADY_MAX_TRACKING_RATE: 130 {BEATS}/MIN
MDC_IDC_SET_BRADY_MODE: NORMAL
MDC_IDC_SET_BRADY_PAV_DELAY_LOW: 180 MS
MDC_IDC_SET_BRADY_SAV_DELAY_LOW: 150 MS
MDC_IDC_SET_LEADCHNL_RA_PACING_AMPLITUDE: 1.5 V
MDC_IDC_SET_LEADCHNL_RA_PACING_ANODE_ELECTRODE_1: NORMAL
MDC_IDC_SET_LEADCHNL_RA_PACING_ANODE_LOCATION_1: NORMAL
MDC_IDC_SET_LEADCHNL_RA_PACING_CAPTURE_MODE: NORMAL
MDC_IDC_SET_LEADCHNL_RA_PACING_CATHODE_ELECTRODE_1: NORMAL
MDC_IDC_SET_LEADCHNL_RA_PACING_CATHODE_LOCATION_1: NORMAL
MDC_IDC_SET_LEADCHNL_RA_PACING_POLARITY: NORMAL
MDC_IDC_SET_LEADCHNL_RA_PACING_PULSEWIDTH: 0.4 MS
MDC_IDC_SET_LEADCHNL_RA_SENSING_ANODE_ELECTRODE_1: NORMAL
MDC_IDC_SET_LEADCHNL_RA_SENSING_ANODE_LOCATION_1: NORMAL
MDC_IDC_SET_LEADCHNL_RA_SENSING_CATHODE_ELECTRODE_1: NORMAL
MDC_IDC_SET_LEADCHNL_RA_SENSING_CATHODE_LOCATION_1: NORMAL
MDC_IDC_SET_LEADCHNL_RA_SENSING_POLARITY: NORMAL
MDC_IDC_SET_LEADCHNL_RA_SENSING_SENSITIVITY: 0.3 MV
MDC_IDC_SET_LEADCHNL_RV_PACING_AMPLITUDE: 2 V
MDC_IDC_SET_LEADCHNL_RV_PACING_ANODE_ELECTRODE_1: NORMAL
MDC_IDC_SET_LEADCHNL_RV_PACING_ANODE_LOCATION_1: NORMAL
MDC_IDC_SET_LEADCHNL_RV_PACING_CAPTURE_MODE: NORMAL
MDC_IDC_SET_LEADCHNL_RV_PACING_CATHODE_ELECTRODE_1: NORMAL
MDC_IDC_SET_LEADCHNL_RV_PACING_CATHODE_LOCATION_1: NORMAL
MDC_IDC_SET_LEADCHNL_RV_PACING_POLARITY: NORMAL
MDC_IDC_SET_LEADCHNL_RV_PACING_PULSEWIDTH: 0.4 MS
MDC_IDC_SET_LEADCHNL_RV_SENSING_ANODE_ELECTRODE_1: NORMAL
MDC_IDC_SET_LEADCHNL_RV_SENSING_ANODE_LOCATION_1: NORMAL
MDC_IDC_SET_LEADCHNL_RV_SENSING_CATHODE_ELECTRODE_1: NORMAL
MDC_IDC_SET_LEADCHNL_RV_SENSING_CATHODE_LOCATION_1: NORMAL
MDC_IDC_SET_LEADCHNL_RV_SENSING_POLARITY: NORMAL
MDC_IDC_SET_LEADCHNL_RV_SENSING_SENSITIVITY: 0.45 MV
MDC_IDC_SET_ZONE_DETECTION_BEATS_DENOMINATOR: 24 {BEATS}
MDC_IDC_SET_ZONE_DETECTION_BEATS_NUMERATOR: 18 {BEATS}
MDC_IDC_SET_ZONE_DETECTION_INTERVAL: 300 MS
MDC_IDC_SET_ZONE_DETECTION_INTERVAL: 320 MS
MDC_IDC_SET_ZONE_DETECTION_INTERVAL: 350 MS
MDC_IDC_SET_ZONE_DETECTION_INTERVAL: 430 MS
MDC_IDC_SET_ZONE_DETECTION_INTERVAL: NORMAL
MDC_IDC_SET_ZONE_TYPE: NORMAL
MDC_IDC_STAT_AT_BURDEN_PERCENT: 0 %
MDC_IDC_STAT_AT_DTM_END: NORMAL
MDC_IDC_STAT_AT_DTM_START: NORMAL
MDC_IDC_STAT_BRADY_AP_VP_PERCENT: 6.72 %
MDC_IDC_STAT_BRADY_AP_VS_PERCENT: 59.83 %
MDC_IDC_STAT_BRADY_AS_VP_PERCENT: 0.86 %
MDC_IDC_STAT_BRADY_AS_VS_PERCENT: 32.6 %
MDC_IDC_STAT_BRADY_DTM_END: NORMAL
MDC_IDC_STAT_BRADY_DTM_START: NORMAL
MDC_IDC_STAT_BRADY_RA_PERCENT_PACED: 65.05 %
MDC_IDC_STAT_BRADY_RV_PERCENT_PACED: 8.18 %
MDC_IDC_STAT_EPISODE_RECENT_COUNT: 0
MDC_IDC_STAT_EPISODE_RECENT_COUNT: 2
MDC_IDC_STAT_EPISODE_RECENT_COUNT_DTM_END: NORMAL
MDC_IDC_STAT_EPISODE_RECENT_COUNT_DTM_START: NORMAL
MDC_IDC_STAT_EPISODE_TOTAL_COUNT: 0
MDC_IDC_STAT_EPISODE_TOTAL_COUNT: 4
MDC_IDC_STAT_EPISODE_TOTAL_COUNT: 59
MDC_IDC_STAT_EPISODE_TOTAL_COUNT_DTM_END: NORMAL
MDC_IDC_STAT_EPISODE_TOTAL_COUNT_DTM_START: NORMAL
MDC_IDC_STAT_EPISODE_TYPE: NORMAL
MDC_IDC_STAT_TACHYTHERAPY_ATP_DELIVERED_RECENT: 0
MDC_IDC_STAT_TACHYTHERAPY_ATP_DELIVERED_TOTAL: 0
MDC_IDC_STAT_TACHYTHERAPY_RECENT_DTM_END: NORMAL
MDC_IDC_STAT_TACHYTHERAPY_RECENT_DTM_START: NORMAL
MDC_IDC_STAT_TACHYTHERAPY_SHOCKS_ABORTED_RECENT: 0
MDC_IDC_STAT_TACHYTHERAPY_SHOCKS_ABORTED_TOTAL: 0
MDC_IDC_STAT_TACHYTHERAPY_SHOCKS_DELIVERED_RECENT: 0
MDC_IDC_STAT_TACHYTHERAPY_SHOCKS_DELIVERED_TOTAL: 0
MDC_IDC_STAT_TACHYTHERAPY_TOTAL_DTM_END: NORMAL
MDC_IDC_STAT_TACHYTHERAPY_TOTAL_DTM_START: NORMAL

## 2019-10-11 DIAGNOSIS — I48.91 ATRIAL FIBRILLATION (H): ICD-10-CM

## 2019-10-11 LAB — INR PPP: 1.7 (ref 0.86–1.14)

## 2019-10-14 ENCOUNTER — ANTICOAGULATION THERAPY VISIT (OUTPATIENT)
Dept: ANTICOAGULATION | Facility: CLINIC | Age: 84
End: 2019-10-14

## 2019-10-14 DIAGNOSIS — I82.409 DEEP VEIN THROMBOSIS (DVT) (H): ICD-10-CM

## 2019-10-14 DIAGNOSIS — Z79.01 LONG TERM CURRENT USE OF ANTICOAGULANT THERAPY: ICD-10-CM

## 2019-10-14 DIAGNOSIS — I48.91 ATRIAL FIBRILLATION, UNSPECIFIED TYPE (H): ICD-10-CM

## 2019-10-14 NOTE — PROGRESS NOTES
ANTICOAGULATION FOLLOW-UP CLINIC VISIT    Patient Name:  Noe Florence  Date:  10/14/2019  Contact Type:  Telephone    SUBJECTIVE:  Patient Findings     Comments:   Spoke with Rica.  She reports Noe has not had any changes in health, diet, medications.         Clinical Outcomes     Comments:   Spoke with Rica.  She reports Noe has not had any changes in health, diet, medications.            OBJECTIVE    INR   Date Value Ref Range Status   10/11/2019 1.70 (H) 0.86 - 1.14 Final       ASSESSMENT / PLAN  INR assessment THER    Recheck INR In: 2 WEEKS    INR Location Clinic      Anticoagulation Summary  As of 10/14/2019    INR goal:   1.5-2.5   TTR:   61.8 % (3.2 y)   INR used for dosin.70 (10/11/2019)   Warfarin maintenance plan:   2.5 mg (5 mg x 0.5) every Thu; 5 mg (5 mg x 1) all other days   Full warfarin instructions:   2.5 mg every Thu; 5 mg all other days   Weekly warfarin total:   32.5 mg   No change documented:   Danya Edwards RN   Plan last modified:   Danya Edwards RN (10/4/2019)   Next INR check:   10/24/2019   Priority:   INR   Target end date:   Indefinite    Indications    Atrial fibrillation (H) [I48.91] [I48.91]  Long-term (current) use of anticoagulants [Z79.01] [Z79.01]  Deep vein thrombosis (DVT) (H) [I82.409]             Anticoagulation Episode Summary     INR check location:       Preferred lab:       Send INR reminders to:   [facility]    Comments:   Patient contact number: [phone] Hx of Falls/Bleed  Can leave results with Rica (friend)  [facility] see's pt. 19 Restarted Warfarin due to DVT.       Anticoagulation Care Providers     Provider Role Specialty Phone number    Frida Desai MD Responsible Cardiology [phone]            See the Encounter Report to view Anticoagulation Flowsheet and Dosing Calendar (Go to Encounters tab in chart review, and find the Anticoagulation Therapy Visit)    Spoke with Rica.      Danya WRIGHT  Jerry RN

## 2019-10-21 ENCOUNTER — HOSPITAL ENCOUNTER (OUTPATIENT)
Dept: OCCUPATIONAL THERAPY | Facility: CLINIC | Age: 84
Setting detail: THERAPIES SERIES
End: 2019-10-21
Attending: INTERNAL MEDICINE
Payer: COMMERCIAL

## 2019-10-21 DIAGNOSIS — R68.89 IMPAIRED FUNCTION OF UPPER EXTREMITY: ICD-10-CM

## 2019-10-21 PROCEDURE — 97165 OT EVAL LOW COMPLEX 30 MIN: CPT | Mod: GO | Performed by: OCCUPATIONAL THERAPIST

## 2019-10-21 NOTE — PROGRESS NOTES
[facility]          OUTPATIENT OCCUPATIONAL THERAPY  EVALUATION  PLAN OF TREATMENT FOR OUTPATIENT REHABILITATION  (COMPLETE FOR INITIAL CLAIMS ONLY)  Patient's Last Name, First Name, M.I.  YOB: 1935  LudivinaNoe  AGATHA                        Provider's Name  [facility] Medical Record No.  [id number]                               Onset Date:     10/08/19(order)   Start of Care Date:     10/21/19   Type:     ___PT   _X_OT   ___SLP Medical Diagnosis:     Impaired function of upper extremity                           OT Diagnosis:     Decreased independence, strenth and endruance training for safety and efficiency with ADLs/ IADLs Visits from SOC:  1   _________________________________________________________________________________  Plan of Treatment/Functional Goals:  ADL training, IADL training, ROM, Strengthening, Coordination training, Cognitive skills, Self care/Home management, Therapeutic activities, Stretching    Goals  Goal Identifier: UE Endurance  Goal Description: Pt will participate in 10 minutes of continuous activty to improve UE endurance needed for OH daily care activities  Target Date: 12/31/19     Goal Identifier: UE Strength  Goal Description: Pt will demo (I) with BUE HEP by increasing R shoulder strength by 1/2 grade and decreasing 9 hole peg test scores in order to assist with return to tennis (ball bouncing).   Target Date: 12/31/19    Therapy Frequency: up 10 visits     Predicted Duration of Therapy Intervention (days/wks): in 10 weeks  Rosalba Sanchez OT          I CERTIFY THE NEED FOR THESE SERVICES FURNISHED UNDER        THIS PLAN OF TREATMENT AND WHILE UNDER MY CARE     (Physician co-signature of this document indicates review and certification of the therapy plan).                Certification date from: 10/21/19, Certification date to:  12/31/19               Referring Physician: Roberto Sarmiento MD     Initial Assessment        See Epic Evaluation      Start Of Care Date: 10/21/19

## 2019-10-21 NOTE — PROGRESS NOTES
[facility]          OUTPATIENT OCCUPATIONAL THERAPY  EVALUATION  PLAN OF TREATMENT FOR OUTPATIENT REHABILITATION  (COMPLETE FOR INITIAL CLAIMS ONLY)  Patient's Last Name, First Name, M.I.  YOB: 1935  Noe Florence                        Provider's Name  [facility] Medical Record No.  [id number]                               Onset Date:     10/08/19(order)   Start of Care Date:     10/21/19   Type:     ___PT   _X_OT   ___SLP Medical Diagnosis:     Impaired function of upper extremity                           OT Diagnosis:     Decreased independence, strenth and endruance training for safety and efficiency with ADLs/ IADLs Visits from SOC:  1   _________________________________________________________________________________  Plan of Treatment/Functional Goals:  ADL training, IADL training, ROM, Strengthening, Coordination training, Cognitive skills, Self care/Home management, Therapeutic activities, Stretching    Goals  Goal Identifier: UE Endurance  Goal Description: Pt will participate in 10 minutes of continuous activty to improve UE endurance needed for OH daily care activities  Target Date: 11/22/19     Goal Identifier: UE Strength  Goal Description: Pt will demo (I) with BUE HEP by increasing R shoulder strength by 1/2 grade and decreasing 9 hole peg test scores in order to assist with return to tennis (ball bouncing).   Target Date: 11/22/19    Therapy Frequency: up 10 visits     Predicted Duration of Therapy Intervention (days/wks): in 10 weeks  Rosalba Sanchez OT          I CERTIFY THE NEED FOR THESE SERVICES FURNISHED UNDER        THIS PLAN OF TREATMENT AND WHILE UNDER MY CARE     (Physician co-signature of this document indicates review and certification of the therapy plan).                Certification date from: 10/21/19, Certification date to:  12/31/19               Referring Physician: Roberto Sarmiento MD     Initial Assessment        See Epic Evaluation      Start Of Care Date: 10/21/19

## 2019-10-21 NOTE — PROGRESS NOTES
10/21/19 0900   Quick Adds   Quick Adds Certification   Type of Visit Initial Outpatient Occupational Therapy Evaluation   General Information   Start Of Care Date 10/21/19   Referring Physician Roberto Sarmiento MD   Orders Evaluate and treat as indicated   Orders Date 10/08/19   Medical Diagnosis Impaired function of upper extremity    Onset of Illness/Injury or Date of Surgery 10/08/19  (order)   Precautions/Limitations Fall precautions   Surgical/Medical History Reviewed Yes   Additional Occupational Profile Info/Pertinent History of Current Problem Sha is an 85 y/o ambidextrous male who is seeing outpatient services related to impairment in UE function. He is  with one son and is retired from the [employer]. He enjoys playing tennis as a hobby.  Pt had a R rotator cuff procedure and presents with limited R shoulder strength in addition to limited elbow extension following a R elbow fracture that resulted from a fall. Pt reports additional falls in the past year resulting in a fractured L hip d/t chronic balance problems. He uses a FW and cane to assist with functional mobility. Limited UE function has interfered with Sha's ability to complete showering tasks on his own and participate in tennis.  PMH: advanced open-angle glaucoma, atrial fibrillation, CKD, h/o colon cancer, hypertension, OA   Role/Living Environment   Current Community Support Personal care attendant;Family/friend caregiver  (30 hours/ week when wife is away)   Patient role/Employment history Retired  (previously worked at the [employer])   Community/Avocational Activities Enjoys playing tennis but hasnt played in over 5 years   Current Living Environment House   Number of Stairs to Enter Home 3   Number of Stairs Within Home   (doesn't go upstairs at this time, uses hospital bed)   Primary Bathroom Location/Comments   (uses bathroom on main floor to avoid stairs)   Primary Bathroom Set Up/Equipment Shower/tub  "chair;Raised toilet seat;Tub grab bar;Toilet grab bar  (trialed a commode but didn't work)   Prior Level - Transfers Independent;Assistive equipment   Prior Level - Ambulation Assistive equipment   Prior Level - ADLS Independent   Prior Responsibilities - IADL Meal Preparation;Housekeeping;Finances;Medication management   Current Assistive Devices - Mobility Walker;Standard cane  (cane with a triangle stand)   Current Assistive Devices - ADL   (Not using AD but feels he could benefit from a shoe horn)   Patient/family Goals Statement to play tennis again, imrpove shoulder strength/ ROM   Pain   Patient currently in pain No   Fall Risk Screen   Fall screen completed by PT   Have you fallen 2 or more times in the past year? Yes  (fell on ice when taking out garbage broke femur, )   Have you fallen and had an injury in the past year? Yes   Timed Up and Go score (seconds)   (fell on ice when taking out garbage broke femur, broke elbow)   Is patient a fall risk? Yes;Department fall risk interventions implemented   Cognitive Status Examination   Level of Consciousness Alert   Follows Commands and Answers Questions Able to follow multistep instructions   Personal Safety and Judgment Intact   Cognitive Comment Wife reports changes after pt as in the hospital feels he is back to baseline. Cognition not formally assessed and will take place to inform further therapy recommendations   Visual Perception   Visual Perception Comments dx of advanced open agnel glaucoma; wears readers; pt stated \"vision is still pretty good\"   Sensation   Upper Extremity Sensory Examination No deficits were identified   Posture   Posture Forward head position   Range of Motion (ROM)   ROM Quick Adds Elbow/Forearm, Right;Shoulder, Right   ROM Comments R shouler flexion limited, R elbow extension limited, all other UE movements WFL   Right Shoulder Flexion ROM   Right Shoulder Flexion AROM - degrees 110 deg secondary to R rotator cuff procedure "   Right Shoulder Flexion PROM - degrees 150   Right Elbow Extension/Flexion ROM   Right Elbow Extension/Flexion AROM - degrees Extension: -25 degrees secondary to R elbow fracture   Strength   Strength MMT: Shoulder;MMT: Elbow/Forearm   Strength Comments R shoulder flexion: 3-; R shoulder abduction: 3-; R elbow fexion/ extension: 4; L UE WFL   Hand Strength   Hand Dominance Ambidextrous   Left Hand  (pounds) 53 pounds   Right Hand  (pounds) 44 pounds   Left Lateral Pinch (pounds) 8 pounds   Right Lateral Pinch (pounds) 7 pounds   Left Three Point Pinch (pounds) 14 pounds   Right Three Point Pinch (pounds) 10 pounds   Hand Strength Comments  strength within typical range for age and gender; L and R lateral pinch below typical range for age and gender; L and R 3 pt pinch within ypical range for age and gender    Coordination   Left Hand, Nine Hole Peg Test (seconds) 32.6   Right Hand, Nine Hole Peg Test (seconds) 58.1   Coordination Comments R hand below typical range for ageand gender    Functional Mobility   Functional Mobility Comments uses FW or standard cane   Transfer Skill   Level of [place]: Transfers independent   Tub/Shower Transfer   Tub/Shower Transfer Comments uses shower bench   Bathing   Level of [place] - Bathing minimum assist (75% patients effort)   Bathing Comments staff assists with reaching cleaning back and bottom d/t limited ROM   Upper Body Dressing   Level of [place]: Dress Upper Body independent   Lower Body Dressing   Level of [place]: Dress Lower Body independent   Toileting   Level of [place]: Toilet independent   Grooming   Level of [place]: Grooming independent   Eating/Self-Feeding   Level of [place]: Eating independent   Instrumental Activities of Daily Living Assessment   IADL Assessment/Observations Wife manages meds but he will start to do this again   IADL Quick Adds Home/Financial/Management;Meal Planning/Preparation;Community  Mobility   Meal Planning/Preparation Has started to do some dishes   Home/Financial Management Manages his own bills.  Wife takes care of most things   Community Mobility Has never driven   Planned Therapy Interventions   Planned Therapy Interventions ADL training;IADL training;ROM;Strengthening;Coordination training;Cognitive skills;Self care/Home management;Therapeutic activities;Stretching   Adult OT Eval Goals   OT Eval Goals (Adult) 2;1    OT Goal 1   Goal Identifier UE Endurance   Goal Description Pt will participate in 10 minutes of continuous activty to improve UE endurance needed for OH daily care activities   Target Date 11/22/19    OT Goal 2   Goal Identifier UE Strength   Goal Description Pt will demo (I) with BUE HEP by increasing R shoulder strength by 1/2 grade and decreasing 9 hole peg test scores in order to assist with return to tennis (ball bouncing).    Target Date 11/22/19   Clinical Impression   Criteria for Skilled Therapeutic Interventions Met Yes, treatment indicated   OT Diagnosis Decreased independence, strenth and endruance training for safety and efficiency with ADLs/ IADLs   Influenced by the following impairments generalized UE weakness, impaired balance   Assessment of Occupational Performance 1-3 Performance Deficits   Identified Performance Deficits bathing, recreational activities, Med mgmt   Clinical Decision Making (Complexity) Low complexity   Therapy Frequency up 10 visits   Predicted Duration of Therapy Intervention (days/wks) in 10 weeks   Risks and Benefits of Treatment have been explained. Yes   Patient, Family & other staff in agreement with plan of care Yes   Clinical Impression Comments Skilled OT services needed to train in HEP in order to increase strength, endurace and ROM to increase safe independence with ADLS and IADLs.   Education Assessment   Barriers To Learning No Barriers   Preferred Learning Style Demonstration;Reading   Therapy Certification   Certification  date from 10/21/19   Certification date to 12/31/19   Total Evaluation Time   OT Eval, Low Complexity Minutes (56201) 40

## 2019-10-21 NOTE — PROGRESS NOTES
[facility]          OUTPATIENT OCCUPATIONAL THERAPY  EVALUATION  PLAN OF TREATMENT FOR OUTPATIENT REHABILITATION  (COMPLETE FOR INITIAL CLAIMS ONLY)  Patient's Last Name, First Name, M.I.  YOB: 1935  LudivinaNoe  AGATHA                        Provider's Name  [facility] Medical Record No.  [id number]                               Onset Date:     10/08/19(order)   Start of Care Date:     10/21/19   Type:     ___PT   _X_OT   ___SLP Medical Diagnosis:     Impaired function of upper extremity                           OT Diagnosis:     Decreased independence, strenth and endruance training for safety and efficiency with ADLs/ IADLs Visits from SOC:  1   _________________________________________________________________________________  Plan of Treatment/Functional Goals:  ADL training, IADL training, ROM, Strengthening, Coordination training, Cognitive skills, Self care/Home management, Therapeutic activities, Stretching    Goals  Goal Identifier: UE Endurance  Goal Description: Pt will participate in 10 minutes of continuous activty to improve UE endurance needed for OH daily care activities  Target Date: 12/31/19     Goal Identifier: UE Strength  Goal Description: Pt will demo (I) with BUE HEP by increasing R shoulder strength by 1/2 grade and decreasing 9 hole peg test scores in order to assist with return to tennis (ball bouncing).   Target Date: 12/31/19    Therapy Frequency: up 10 visits     Predicted Duration of Therapy Intervention (days/wks): in 10 weeks  Rosalba Sanchez OT          I CERTIFY THE NEED FOR THESE SERVICES FURNISHED UNDER        THIS PLAN OF TREATMENT AND WHILE UNDER MY CARE     (Physician co-signature of this document indicates review and certification of the therapy plan).                Certification date from: 10/21/19, Certification date to:  12/31/19               Referring Physician: Roberto Sarmiento MD     Initial Assessment        See Epic Evaluation      Start Of Care Date: 10/21/19

## 2019-10-22 ENCOUNTER — THERAPY VISIT (OUTPATIENT)
Dept: PHYSICAL THERAPY | Facility: CLINIC | Age: 84
End: 2019-10-22
Payer: COMMERCIAL

## 2019-10-22 DIAGNOSIS — Z91.81 AT HIGH RISK FOR FALLS: ICD-10-CM

## 2019-10-22 DIAGNOSIS — M62.81 GENERALIZED MUSCLE WEAKNESS: Primary | ICD-10-CM

## 2019-10-22 DIAGNOSIS — R26.81 GAIT INSTABILITY: ICD-10-CM

## 2019-10-22 NOTE — DISCHARGE INSTRUCTIONS
10/22/19    Exercises:    1. Sit to stand from chair using left arm only; 5 repetitions  2. Walking with walker- at home and the [facility]  3. Stairs - 3 times a week (try going up the whole 14 stairs once they are cleared)  4. Hamstring stretch - use gait belt around bottom of foot and pull up: keep knee straight. Hold 30 seconds on each side- at least 2 times a day  5. Lay flat on your bed with 1 pillow only- bring both arms out to the side and keep legs straight. Hold position for 5 minutes a day

## 2019-10-25 ENCOUNTER — ANTICOAGULATION THERAPY VISIT (OUTPATIENT)
Dept: ANTICOAGULATION | Facility: CLINIC | Age: 84
End: 2019-10-25

## 2019-10-25 DIAGNOSIS — I48.91 ATRIAL FIBRILLATION (H): ICD-10-CM

## 2019-10-25 DIAGNOSIS — Z79.01 LONG TERM CURRENT USE OF ANTICOAGULANT THERAPY: ICD-10-CM

## 2019-10-25 DIAGNOSIS — I48.91 ATRIAL FIBRILLATION, UNSPECIFIED TYPE (H): ICD-10-CM

## 2019-10-25 DIAGNOSIS — I82.409 DEEP VEIN THROMBOSIS (DVT) (H): ICD-10-CM

## 2019-10-25 LAB — INR PPP: 1.54 (ref 0.86–1.14)

## 2019-10-25 NOTE — PROGRESS NOTES
ANTICOAGULATION FOLLOW-UP CLINIC VISIT    Patient Name:  Noe Florence  Date:  10/25/2019  Contact Type:  Telephone    SUBJECTIVE:  Patient Findings     Positives:   Change in diet/appetite    Comments:   Had a small amount of broccoli and spinach over the week         Clinical Outcomes     Comments:   Had a small amount of broccoli and spinach over the week            OBJECTIVE    INR   Date Value Ref Range Status   10/25/2019 1.54 (H) 0.86 - 1.14 Final       ASSESSMENT / PLAN  INR assessment THER    Recheck INR In: 3 WEEKS    INR Location Clinic      Anticoagulation Summary  As of 10/25/2019    INR goal:   1.5-2.5   TTR:   62.2 % (3.2 y)   INR used for dosin.54 (10/25/2019)   Warfarin maintenance plan:   2.5 mg (5 mg x 0.5) every Thu; 5 mg (5 mg x 1) all other days   Full warfarin instructions:   2.5 mg every Thu; 5 mg all other days   Weekly warfarin total:   32.5 mg   Plan last modified:   Danya Edwards RN (10/4/2019)   Next INR check:   11/15/2019   Priority:   INR   Target end date:   Indefinite    Indications    Atrial fibrillation (H) [I48.91] [I48.91]  Long-term (current) use of anticoagulants [Z79.01] [Z79.01]  Deep vein thrombosis (DVT) (H) [I82.409]             Anticoagulation Episode Summary     INR check location:       Preferred lab:       Send INR reminders to:   [facility]    Comments:   Patient contact number: [phone] Hx of Falls/Bleed  Can leave results with Rica (nadiya)  [facility] see's pt. 19 Restarted Warfarin due to DVT.       Anticoagulation Care Providers     Provider Role Specialty Phone number    Frida Desai MD Responsible Cardiology [phone]            See the Encounter Report to view Anticoagulation Flowsheet and Dosing Calendar (Go to Encounters tab in chart review, and find the Anticoagulation Therapy Visit)    Spoke with friend Rica. Gave them lab results and new warfarin recommendation.  No changes in health, medication, or  diet. No missed doses, no falls. No signs or symptoms of bleed or clotting.     Nguyen Zuniga RN

## 2019-10-29 ENCOUNTER — THERAPY VISIT (OUTPATIENT)
Dept: PHYSICAL THERAPY | Facility: CLINIC | Age: 84
End: 2019-10-29
Payer: COMMERCIAL

## 2019-10-29 DIAGNOSIS — R26.81 GAIT INSTABILITY: ICD-10-CM

## 2019-10-29 DIAGNOSIS — M62.81 GENERALIZED MUSCLE WEAKNESS: Primary | ICD-10-CM

## 2019-10-29 DIAGNOSIS — M21.372 LEFT FOOT DROP: ICD-10-CM

## 2019-10-29 DIAGNOSIS — Z91.81 AT HIGH RISK FOR FALLS: ICD-10-CM

## 2019-10-31 ENCOUNTER — HOSPITAL ENCOUNTER (OUTPATIENT)
Dept: OCCUPATIONAL THERAPY | Facility: CLINIC | Age: 84
Setting detail: THERAPIES SERIES
End: 2019-10-31
Attending: INTERNAL MEDICINE
Payer: COMMERCIAL

## 2019-10-31 PROCEDURE — 97110 THERAPEUTIC EXERCISES: CPT | Mod: GO | Performed by: OCCUPATIONAL THERAPIST

## 2019-11-01 ENCOUNTER — ONCOLOGY VISIT (OUTPATIENT)
Dept: ONCOLOGY | Facility: CLINIC | Age: 84
End: 2019-11-01
Attending: INTERNAL MEDICINE
Payer: COMMERCIAL

## 2019-11-01 DIAGNOSIS — C18.9 MALIGNANT NEOPLASM OF COLON, UNSPECIFIED PART OF COLON (H): Primary | ICD-10-CM

## 2019-11-01 PROCEDURE — 97802 MEDICAL NUTRITION INDIV IN: CPT | Mod: ZF | Performed by: DIETITIAN, REGISTERED

## 2019-11-01 NOTE — LETTER
"11/1/2019       RE: Noe Florence  [address] [facility] [address]     Dear Colleague,    Thank you for referring your patient, Noe Florence, to the [facility]. Please see a copy of my visit note below.    CLINICAL NUTRITION SERVICES - ASSESSMENT NOTE    Noe Florence 84 year old referred for MNT related to weight loss    Time Spent: 45 minutes  Visit Type:  initial  Referring Physician:  Torsten  Pt accompanied by:  his wife, Rica    Nutrition Prescription   Recommendations Suggested by Registered Dietitian (RD):   1. 2100-2400kcal/day via small frequent meals  2. 70-85g protein/day, including protein with each meal   Future/Additional Recommendations: none at this time   Malnutrition:      NUTRITION HISTORY  Factors affecting nutrition intake include:diarrhea  Current diet: general  Current appetite/intake: fair, improved      Sha reports that his weight has been stable over the past 5 months and his appetite is good.   He will occasionally decrease his intake depending on his GI function.  He has ongoing/intermittent loose stools 2/2 colon resections with history of colon cancer.   He denies oily/greasy stools.    He has a PCA that comes to his house 3-4 times/week to help with ADL's and meal preparation.  The PCA will prepare most dinners and his wife prepares his breakfast and lunch meals.     Diet Recall  Breakfast Fiber One cereal, banana, 1 bottle of boost   Lunch Vegetable omelet or chicken noodle soup or salmon with salad and rice   Dinner Tilapia with salad and rice or pork chop with potatoes, gravy and vegetables   Snacks Fruit, nuts, cookies, peanut butter english muffin or satish with hummus   Beverages ICE water, Boost     ANTHROPOMETRICS  Height: 67\"  Weight: 154 lbs/70kg  BMI: 24  Weight Status:  Normal BMI  IBW: 148 lbs  % IBW: 104%  Weight History: stable  Wt Readings from Last 10 Encounters:   11/01/19 70.3 kg (154 lb 14.4 oz)   10/07/19 70.2 kg (154 lb 11.2 " oz)   08/23/19 67 kg (147 lb 11.2 oz)   08/19/19 68 kg (150 lb)   07/26/19 69.2 kg (152 lb 8 oz)   07/23/19 69 kg (152 lb 1.6 oz)   07/15/19 65.8 kg (145 lb)   07/08/19 66.2 kg (146 lb)   07/01/19 67.8 kg (149 lb 6.4 oz)   06/17/19 66.2 kg (146 lb)     Dosing Weight: 70kg    Medications/vitamins/minerals/herbals:   Reviewed    Labs:   Labs reviewed    Past Medical History:   Diagnosis Date     Advanced open-angle glaucoma      Atrial fibrillation (H)      CKD (chronic kidney disease), stage III (H) 2005     Colon cancer (H)     Stage II-B colon cancer     Coronary artery disease     s/p CABG x 2, JEREMY x 2     Diverticulitis      Hyperlipidemia      Hypertension      Ischemic cardiomyopathy      MGUS (monoclonal gammopathy of unknown significance)      Nonsenile cataract     BE     Osteoporosis     left hip fracture     Polymorphic ventricular tachycardia (H)      Polymyalgia rheumatica (H)      PVD (posterior vitreous detachment), both eyes 2005     S/P ablation of atrial flutter 6/20/14    CTI     Stented coronary artery      SVT (supraventricular tachycardia) (H)     PPM/AICD for NSVT     Upper leg DVT (deep venous thromboembolism), chronic (H)     Left     Weight loss, unintentional 2017    15 lb in 4 months       ASSESSED NUTRITION NEEDS:  Estimated Energy Needs: 2002-2473 kcals (30-35 Kcal/Kg)  Justification: repletion  Estimated Protein Needs: 70-84 grams protein (1-1.2 g pro/Kg)  Justification: preservation of lean body mass  Estimated Fluid Needs: 2000  mL   Justification: maintenance    MALNUTRITION:  % Weight Loss:  Weight loss does not meet criteria for malnutrition   % Intake:  Decreased intake does not meet criteria for malnutrition   Subcutaneous Fat Loss:  None observed  Muscle Loss:  Patellar region mild depletion  Fluid Retention:  None noted    Malnutrition Diagnosis: Patient does not meet two of the above criteria necessary for diagnosing malnutrition    NUTRITION DIAGNOSIS:  Food and  nutrition-related knowledge deficit related to nutrition needs for weight gain/maintenance as evidenced by pt with questions regarding optimal nutrition for health maintenance.     INTERVENTIONS  Provided written & verbal education:   - Discussed ways to maximize and fortify foods with calories and protein via small frequent meals.    - Advised pt to aim for at least 2100kcal and 60-85g protein daily.   - Reviewed ONS options (Mass Pro weight ata, Ensure Enlive, Ensure Plus/Boost Plus, CIB, Benecalorie etc). Encouraged utilizing these ONS in home made shakes/smoothies to prevent flavor fatigue.  Encouraged pt to start consuming 2 ONS or home made shakes/smoothies daily.       Provided pt with corresponding education materials/handouts on:  Six Small Meals a Day, High Calorie, High Protein Recipe booklet, Tips to Increase the Calories in Your Diet and Sources of Protein.     Pt verbalize understanding of materials provided during consult.   Patient Understanding: Excellent  Expected Compliance: Excellent     Implementation  General/healthful diet and Medical Food Supplement - pt will work on increasing his intake via high calorie food and ONS (1 additional Boost/day).     Goals  1.  Aim for 5-6 small frequent meals  2.  Aim for 2100-2400kcal and 60-85g protein/day  3. Weight maintenance or weight gain towards 160 lbs    Follow-Up Plans: Pt has RD contact information for questions.      MONITORING AND EVALUATION:  -Food intake  -Liquid meal replacement or supplement  -Weight trends    Barbara Wang RDN, LD

## 2019-11-04 VITALS — BODY MASS INDEX: 24.26 KG/M2 | WEIGHT: 154.9 LBS

## 2019-11-04 NOTE — PROGRESS NOTES
"CLINICAL NUTRITION SERVICES - ASSESSMENT NOTE    Noe Florence 84 year old referred for MNT related to weight loss    Time Spent: 45 minutes  Visit Type: initial  Referring Physician: Torsten Read accompanied by: his wife, Rica    Nutrition Prescription   Recommendations Suggested by Registered Dietitian (RD):   1. 2100-2400kcal/day via small frequent meals  2. 70-85g protein/day, including protein with each meal   Future/Additional Recommendations: none at this time   Malnutrition:      NUTRITION HISTORY  Factors affecting nutrition intake include:diarrhea  Current diet: general  Current appetite/intake: fair, improved      Sha reports that his weight has been stable over the past 5 months and his appetite is good.   He will occasionally decrease his intake depending on his GI function.  He has ongoing/intermittent loose stools 2/2 colon resections with history of colon cancer.   He denies oily/greasy stools.    He has a PCA that comes to his house 3-4 times/week to help with ADL's and meal preparation.  The PCA will prepare most dinners and his wife prepares his breakfast and lunch meals.     Diet Recall  Breakfast Fiber One cereal, banana, 1 bottle of boost   Lunch Vegetable omelet or chicken noodle soup or salmon with salad and rice   Dinner Tilapia with salad and rice or pork chop with potatoes, gravy and vegetables   Snacks Fruit, nuts, cookies, peanut butter english muffin or satish with hummus   Beverages ICE water, Boost     ANTHROPOMETRICS  Height: 67\"  Weight: 154 lbs/70kg  BMI: 24  Weight Status:  Normal BMI  IBW: 148 lbs  % IBW: 104%  Weight History: stable  Wt Readings from Last 10 Encounters:   11/01/19 70.3 kg (154 lb 14.4 oz)   10/07/19 70.2 kg (154 lb 11.2 oz)   08/23/19 67 kg (147 lb 11.2 oz)   08/19/19 68 kg (150 lb)   07/26/19 69.2 kg (152 lb 8 oz)   07/23/19 69 kg (152 lb 1.6 oz)   07/15/19 65.8 kg (145 lb)   07/08/19 66.2 kg (146 lb)   07/01/19 67.8 kg (149 lb 6.4 oz)   06/17/19 66.2 kg " (146 lb)     Dosing Weight: 70kg    Medications/vitamins/minerals/herbals:   Reviewed    Labs:   Labs reviewed    Past Medical History:   Diagnosis Date     Advanced open-angle glaucoma      Atrial fibrillation (H)      CKD (chronic kidney disease), stage III (H) 2005     Colon cancer (H)     Stage II-B colon cancer     Coronary artery disease     s/p CABG x 2, JEREMY x 2     Diverticulitis      Hyperlipidemia      Hypertension      Ischemic cardiomyopathy      MGUS (monoclonal gammopathy of unknown significance)      Nonsenile cataract     BE     Osteoporosis     left hip fracture     Polymorphic ventricular tachycardia (H)      Polymyalgia rheumatica (H)      PVD (posterior vitreous detachment), both eyes 2005     S/P ablation of atrial flutter 6/20/14    CTI     Stented coronary artery      SVT (supraventricular tachycardia) (H)     PPM/AICD for NSVT     Upper leg DVT (deep venous thromboembolism), chronic (H)     Left     Weight loss, unintentional 2017    15 lb in 4 months       ASSESSED NUTRITION NEEDS:  Estimated Energy Needs: 0998-7579 kcals (30-35 Kcal/Kg)  Justification: repletion  Estimated Protein Needs: 70-84 grams protein (1-1.2 g pro/Kg)  Justification: preservation of lean body mass  Estimated Fluid Needs: 2000  mL   Justification: maintenance    MALNUTRITION:  % Weight Loss:  Weight loss does not meet criteria for malnutrition   % Intake:  Decreased intake does not meet criteria for malnutrition   Subcutaneous Fat Loss:  None observed  Muscle Loss:  Patellar region mild depletion  Fluid Retention:  None noted    Malnutrition Diagnosis: Patient does not meet two of the above criteria necessary for diagnosing malnutrition    NUTRITION DIAGNOSIS:  Food and nutrition-related knowledge deficit related to nutrition needs for weight gain/maintenance as evidenced by pt with questions regarding optimal nutrition for health maintenance.     INTERVENTIONS  Provided written & verbal education:   - Discussed ways  to maximize and fortify foods with calories and protein via small frequent meals.    - Advised pt to aim for at least 2100kcal and 60-85g protein daily.   - Reviewed ONS options (Mass Pro weight ata, Ensure Enlive, Ensure Plus/Boost Plus, CIB, Benecalorie etc). Encouraged utilizing these ONS in home made shakes/smoothies to prevent flavor fatigue.  Encouraged pt to start consuming 2 ONS or home made shakes/smoothies daily.       Provided pt with corresponding education materials/handouts on:  Six Small Meals a Day, High Calorie, High Protein Recipe booklet, Tips to Increase the Calories in Your Diet and Sources of Protein.     Pt verbalize understanding of materials provided during consult.   Patient Understanding: Excellent  Expected Compliance: Excellent     Implementation  General/healthful diet and Medical Food Supplement - pt will work on increasing his intake via high calorie food and ONS (1 additional Boost/day).     Goals  1.  Aim for 5-6 small frequent meals  2.  Aim for 2100-2400kcal and 60-85g protein/day  3. Weight maintenance or weight gain towards 160 lbs      Follow-Up Plans: Pt has RD contact information for questions.      MONITORING AND EVALUATION:  -Food intake  -Liquid meal replacement or supplement  -Weight trends    Barbara Wang RDN, LD

## 2019-11-05 ENCOUNTER — THERAPY VISIT (OUTPATIENT)
Dept: PHYSICAL THERAPY | Facility: CLINIC | Age: 84
End: 2019-11-05
Payer: COMMERCIAL

## 2019-11-05 ENCOUNTER — HOSPITAL ENCOUNTER (OUTPATIENT)
Dept: OCCUPATIONAL THERAPY | Facility: CLINIC | Age: 84
Setting detail: THERAPIES SERIES
End: 2019-11-05
Attending: INTERNAL MEDICINE
Payer: COMMERCIAL

## 2019-11-05 DIAGNOSIS — M21.372 LEFT FOOT DROP: ICD-10-CM

## 2019-11-05 DIAGNOSIS — R26.81 GAIT INSTABILITY: ICD-10-CM

## 2019-11-05 DIAGNOSIS — Z91.81 AT HIGH RISK FOR FALLS: ICD-10-CM

## 2019-11-05 DIAGNOSIS — M62.81 GENERALIZED MUSCLE WEAKNESS: Primary | ICD-10-CM

## 2019-11-05 PROCEDURE — 97110 THERAPEUTIC EXERCISES: CPT | Mod: GO | Performed by: OCCUPATIONAL THERAPIST

## 2019-11-11 ENCOUNTER — THERAPY VISIT (OUTPATIENT)
Dept: PHYSICAL THERAPY | Facility: CLINIC | Age: 84
End: 2019-11-11
Payer: COMMERCIAL

## 2019-11-11 DIAGNOSIS — Z91.81 AT HIGH RISK FOR FALLS: ICD-10-CM

## 2019-11-11 DIAGNOSIS — M62.81 GENERALIZED MUSCLE WEAKNESS: Primary | ICD-10-CM

## 2019-11-11 DIAGNOSIS — R26.81 GAIT INSTABILITY: ICD-10-CM

## 2019-11-12 ENCOUNTER — HOSPITAL ENCOUNTER (OUTPATIENT)
Dept: OCCUPATIONAL THERAPY | Facility: CLINIC | Age: 84
Setting detail: THERAPIES SERIES
End: 2019-11-12
Attending: INTERNAL MEDICINE
Payer: COMMERCIAL

## 2019-11-12 PROCEDURE — 97110 THERAPEUTIC EXERCISES: CPT | Mod: GO | Performed by: OCCUPATIONAL THERAPIST

## 2019-11-12 PROCEDURE — 97530 THERAPEUTIC ACTIVITIES: CPT | Mod: GO | Performed by: OCCUPATIONAL THERAPIST

## 2019-11-12 NOTE — MR AVS SNAPSHOT
After Visit Summary   2/13/2018    Noe Florence    MRN: [id number]           Patient Information     Date Of Birth          1935        Visit Information        Provider Department      2/13/2018 9:00 AM Shanell Barone AuD [facility]        Today's Diagnoses     Sensory hearing loss, bilateral    -  1       Follow-ups after your visit        Your next 10 appointments already scheduled     Feb 16, 2018  9:00 AM CST   (Arrive by 8:45 AM)   Neuro Eval with Rica Bauman PT    [facility] ([facility])    [address] [place] [id number] [phone]            Mar 30, 2018 11:15 AM CDT   LAB with  LAB   [facility] ([facility])    [address] [place] [id number] [phone]           Please do not eat 10-12 hours before your appointment if you are coming in fasting for labs on lipids, cholesterol, or glucose (sugar). This does not apply to pregnant women. Water, hot tea and black coffee (with nothing added) are okay. Do not drink other fluids, diet soda or chew gum.            Mar 30, 2018 11:40 AM CDT   (Arrive by 11:25 AM)   CT CHEST/ABDOMEN/PELVIS W CONTRAST with UCCT2   [facility] CT ([facility])    [address] [place] [address] [id number] [phone]           Please bring any scans or X-rays taken at other hospitals, if similar tests were done. Also bring a list of your medicines, including vitamins, minerals and over-the-counter drugs. It is safest to leave personal items at home.  Be sure to tell your doctor:   If you have any allergies.   If there s any chance you are pregnant.   If you are breastfeeding.  You may have contrast for this exam. To prepare:   Do not eat or drink for 2 hours before your exam. If you need to take medicine, you may take it with small sips of water. (We may ask you to take liquid  medicine as well.)   The day before your exam, drink extra fluids at least six 8-ounce glasses (unless your doctor tells you to restrict your fluids).   You will be given instructions on how to drink the contrast.  Patients over 70 or patients with diabetes or kidney problems:   If you haven t had a blood test (creatinine test) within the last 30 days, the Cardiologist/Radiologist may require you to get this test prior to your exam.  If you have diabetes:   Continue to take your metformin medication on the day of your exam  Please wear loose clothing, such as a sweat suit or jogging clothes. Avoid snaps, zippers and other metal. We may ask you to undress and put on a hospital gown.  If you have any questions, please call the Imaging Department where you will have your exam.            Apr 02, 2018 10:45 AM CDT   (Arrive by 10:30 AM)   Return Visit with Francisco Gilliam MD   [facility] ([facility])    [address] [place] [address]  [address]  [facility] [address]   [phone]            May 21, 2018  3:30 PM CDT   (Arrive by 3:15 PM)   Return Visit with Lashawn Jackson MD   [facility] ([facility])    [address] [place] [address]  3rd Floor  [facility] [address]   [phone]            May 29, 2018  4:00 PM CDT   (Arrive by 3:45 PM)   Return Vascular Visit with Frida Desai MD   [facility] ([facility])    [address] [place] [address]  [address]  [facility] [address]   [phone]            Jun 04, 2018  9:00 AM CDT   (Arrive by 8:45 AM)   Implanted Defibulator with Uc Cv Device 1   [facility] ([facility])    [address] [place] [address]  [address]  [facility] [address]   [phone]            Aug 06, 2018 10:00 AM CDT   (Arrive by 9:45 AM)   Return Visit with Roberto Sarmiento MD   [facility] ([facility])     [address]  4th Floor  [facility] [address] [phone]              Who to contact     Please call your clinic at [phone] to:    Ask questions about your health    Make or cancel appointments    Discuss your medicines    Learn about your test results    Speak to your doctor            Additional Information About Your Visit        MyChart Information     [employer] gives you secure access to your electronic health record. If you see a primary care provider, you can also send messages to your care team and make appointments. If you have questions, please call your primary care clinic.  If you do not have a primary care provider, please call [phone] and they will assist you.      [employer] is an electronic gateway that provides easy, online access to your medical records. With [employer], you can request a clinic appointment, read your test results, renew a prescription or communicate with your care team.     To access your existing account, please contact your [facility] or call [phone] for assistance.        Care EveryWhere ID     This is your Care EveryWhere ID. This could be used by other organizations to access your [facility] medical records  [id number]         Blood Pressure from Last 3 Encounters:   02/12/18 95/51   01/15/18 123/67   12/21/17 105/64    Weight from Last 3 Encounters:   01/15/18 67.8 kg (149 lb 8 oz)   12/21/17 68.2 kg (150 lb 6.4 oz)   12/05/17 68.9 kg (152 lb)              We Performed the Following     AUDIOGRAM/TYMPANOGRAM - INTERFACE     [facility] Thrshld Eval & Speech Recog ([address])     Nhi   ([id number])     Tymps / Reflex   ([id number])        Primary Care Provider Office Phone # Fax #    Roberto Sarmiento [phone]       [address] [place] [phone]        Equal Access to Services     LEE CASTILLO : Delilah Bains, jayashree harmon, gino holcomb, sil zamora  vane burden ah. So [facility] [phone].    ATENCIÓN: Si marcusla siddhartha, tiene a aguirre disposición servicios gratuitos de asistencia lingüística. Lauren galicia [phone].    We comply with applicable federal civil rights laws and [place] laws. We do not discriminate on the basis of race, color, national origin, age, disability, sex, sexual orientation, or gender identity.            Thank you!     Thank you for choosing [facility]UDIOLOG[facility]  for your care. Our goal is always to provide you with excellent care. Hearing back from our patients is one way we can continue to improve our services. Please take a few minutes to complete the written survey that you may receive in the mail after your visit with us. Thank you!             Your Updated Medication List - Protect others around you: Learn how to safely use, store and throw away your medicines at www.disposemymeds.org.          This list is accurate as of 2/13/18 11:22 AM.  Always use your most recent med list.                   Brand Name Dispense Instructions for use Diagnosis    aspirin 81 MG tablet      Take 1 tablet by mouth daily.        atorvastatin 40 MG tablet    LIPITOR    100 tablet    Take 1 tablet (40 mg) by mouth daily    Hyperlipidemia, unspecified hyperlipidemia type       bacitracin ointment     28 g    Apply topically 2 times daily APPLY TO LEFT LEG TWO TIMES PER DAY    Left leg cellulitis       Blood Pressure Monitor Kit     1 kit    1 Units daily    Hypertension       CENTRUM SILVER per tablet      Take 1 tablet by mouth daily. AM        ciclopirox 0.77 % cream    LOPROX    90 g    Apply topically 2 times daily To feet and toenails.    Dermatophytosis of nail, Tinea pedis of both feet       cyanocobalamin 1000 MCG tablet    vitamin  B-12    180 tablet    Take 2 tablets (2,000 mcg) by mouth daily    Anemia       doxazosin 2 MG tablet    CARDURA    90 tablet    Take 1/2 tab in the morning and 1/2 tab in the evening    Essential hypertension        fluticasone 50 MCG/ACT spray    FLONASE    3 Package    Spray 2 sprays into both nostrils daily    Unspecified sinusitis (chronic)       ketoconazole 2 % cream    NIZORAL    60 g    Apply topically daily On hold    Tinea corporis       loratadine 10 MG tablet    CLARITIN     Take 10 mg by mouth as needed        metoprolol tartrate 25 MG tablet    LOPRESSOR    180 tablet    Take 1 tablet (25 mg) by mouth 2 times daily    Coronary artery disease involving native heart without angina pectoris, unspecified vessel or lesion type       mirtazapine 15 MG tablet    REMERON     Take 15 mg by mouth At Bedtime.        order for DME     1 Units    20-30 mm Hg knee length compression stockings.  Measure and fit.  Style and color per patient preference.  Doff n Aracelis per patient need.    PVD (peripheral vascular disease) (H), Ulcer of left lower extremity, limited to breakdown of skin (H)       triamcinolone 0.1 % cream    KENALOG    80 g    Apply topically 2 times daily For up to two weeks in a row    Atopic eczema       warfarin 5 MG tablet    COUMADIN    35 tablet    7.5 mg on Wed; 5 mg all other days  or as instructed by coumadin clinic.    Atrial flutter (H)       ZOLOFT 100 MG tablet   Generic drug:  sertraline      Take 100 mg by mouth every evening.           Bi-Rhombic Flap Text: The defect edges were debeveled with a #15 scalpel blade.  Given the location of the defect and the proximity to free margins a bi-rhombic flap was deemed most appropriate.  Using a sterile surgical marker, an appropriate rhombic flap was drawn incorporating the defect. The area thus outlined was incised deep to adipose tissue with a #15 scalpel blade.  The skin margins were undermined to an appropriate distance in all directions utilizing iris scissors.

## 2019-11-14 ENCOUNTER — TRANSFERRED RECORDS (OUTPATIENT)
Dept: HEALTH INFORMATION MANAGEMENT | Facility: CLINIC | Age: 84
End: 2019-11-14

## 2019-11-14 ENCOUNTER — MEDICAL CORRESPONDENCE (OUTPATIENT)
Dept: HEALTH INFORMATION MANAGEMENT | Facility: CLINIC | Age: 84
End: 2019-11-14

## 2019-11-14 NOTE — TELEPHONE ENCOUNTER
RECORDS RECEIVED FROM: Internal   DATE RECEIVED: 2.4.2020   NOTES STATUS DETAILS   OFFICE NOTE from referring provider    N/A    OFFICE NOTE from other cardiologists   and neurologists Internal Pt used to see Dr. Desai, established pt.   DISCHARGE SUMMARY from hospital    Internal    DISCHARGE REPORT from the ER   N/A    OPERATIVE REPORT    N/A    MEDICATION LIST   Internal    LABS     BMP   N/A    CBC   Internal 5.24.19   CMP   Internal 8.19.19   Lipids   Internal 8.12.19   TSH   N/A    DIAGNOSTIC PROCEDURES     EKG  Strips *important Internal 5.22.19 most recent   Monitor Reports  Strips *important N/A    Cardioversions   N/A    ICD/pacemaker implant   Internal 7.1.19   Tilt table studies   N/A    IMAGING (DISC & REPORT)      ECHO's   N/A    Stress Tests   N/A    Cath   N/A    CT/CTA   Internal 8.12.19 Ct chest/abd/pelvis most recent   MRI/MRA   N/A

## 2019-11-16 DIAGNOSIS — I48.91 ATRIAL FIBRILLATION (H): ICD-10-CM

## 2019-11-16 LAB — INR PPP: 1.24 (ref 0.86–1.14)

## 2019-11-18 ENCOUNTER — OFFICE VISIT (OUTPATIENT)
Dept: INTERNAL MEDICINE | Facility: CLINIC | Age: 84
End: 2019-11-18
Payer: COMMERCIAL

## 2019-11-18 ENCOUNTER — ANTICOAGULATION THERAPY VISIT (OUTPATIENT)
Dept: ANTICOAGULATION | Facility: CLINIC | Age: 84
End: 2019-11-18

## 2019-11-18 VITALS
DIASTOLIC BLOOD PRESSURE: 65 MMHG | BODY MASS INDEX: 24.12 KG/M2 | OXYGEN SATURATION: 99 % | HEART RATE: 66 BPM | WEIGHT: 154 LBS | SYSTOLIC BLOOD PRESSURE: 113 MMHG

## 2019-11-18 DIAGNOSIS — I48.91 ATRIAL FIBRILLATION, UNSPECIFIED TYPE (H): ICD-10-CM

## 2019-11-18 DIAGNOSIS — D47.2 MONOCLONAL PARAPROTEINEMIA: ICD-10-CM

## 2019-11-18 DIAGNOSIS — R35.0 URINARY FREQUENCY: ICD-10-CM

## 2019-11-18 DIAGNOSIS — R35.0 URINARY FREQUENCY: Primary | ICD-10-CM

## 2019-11-18 DIAGNOSIS — N39.41 URGENCY INCONTINENCE: ICD-10-CM

## 2019-11-18 DIAGNOSIS — Z79.01 LONG TERM CURRENT USE OF ANTICOAGULANT THERAPY: ICD-10-CM

## 2019-11-18 DIAGNOSIS — R09.89 DECREASED PEDAL PULSES: ICD-10-CM

## 2019-11-18 DIAGNOSIS — I48.91 ATRIAL FIBRILLATION (H): ICD-10-CM

## 2019-11-18 DIAGNOSIS — D64.9 ANEMIA, UNSPECIFIED TYPE: ICD-10-CM

## 2019-11-18 DIAGNOSIS — I82.409 DEEP VEIN THROMBOSIS (DVT) (H): ICD-10-CM

## 2019-11-18 LAB
ANION GAP SERPL CALCULATED.3IONS-SCNC: 5 MMOL/L (ref 3–14)
BUN SERPL-MCNC: 56 MG/DL (ref 7–30)
CALCIUM SERPL-MCNC: 9.2 MG/DL (ref 8.5–10.1)
CHLORIDE SERPL-SCNC: 110 MMOL/L (ref 94–109)
CO2 SERPL-SCNC: 26 MMOL/L (ref 20–32)
CREAT SERPL-MCNC: 1.47 MG/DL (ref 0.66–1.25)
ERYTHROCYTE [DISTWIDTH] IN BLOOD BY AUTOMATED COUNT: 14.6 % (ref 10–15)
GFR SERPL CREATININE-BSD FRML MDRD: 43 ML/MIN/{1.73_M2}
GLUCOSE SERPL-MCNC: 51 MG/DL (ref 70–99)
HCT VFR BLD AUTO: 36.2 % (ref 40–53)
HGB BLD-MCNC: 11.5 G/DL (ref 13.3–17.7)
INR PPP: 1.28 (ref 0.86–1.14)
MCH RBC QN AUTO: 30.2 PG (ref 26.5–33)
MCHC RBC AUTO-ENTMCNC: 31.8 G/DL (ref 31.5–36.5)
MCV RBC AUTO: 95 FL (ref 78–100)
PLATELET # BLD AUTO: 172 10E9/L (ref 150–450)
POTASSIUM SERPL-SCNC: 4.3 MMOL/L (ref 3.4–5.3)
RBC # BLD AUTO: 3.81 10E12/L (ref 4.4–5.9)
SODIUM SERPL-SCNC: 141 MMOL/L (ref 133–144)
WBC # BLD AUTO: 7.8 10E9/L (ref 4–11)

## 2019-11-18 ASSESSMENT — PAIN SCALES - GENERAL: PAINLEVEL: NO PAIN (0)

## 2019-11-18 NOTE — NURSING NOTE
Chief Complaint   Patient presents with     Recheck Medication     9 month follow up     Kimberly Nissen, EMT at 8:40 AM on 11/18/2019

## 2019-11-18 NOTE — PROGRESS NOTES
HPI  84-year-old returns today with his wife with several concerns.  He is been tolerating the anticoagulation well has had no bleeding problems and no further clotting issues.  He is participating in physical therapy he has not had any recent falls.  He is ambulating with the use of a walker and is progressing to cane travel.  He is hoping to get back on the tennis court at least hit against the ball machine.  He is improved his posture and he is more upright and stable now.  He has had ongoing issues with urinary incontinence.  He was referred for pelvic floor therapy but this  and needs a new referral.  He is taking the tamsulosin which has decreased some of the urgency but he still has spontaneous incontinence and needs to wear pads.  He also has been having concerns about the weakness in his legs.  He has had a foot drop on the left for which he uses the AFO but more recently there is been concern expressed about right-sided weakness and a cold foot.  He does not have any claudication or symptoms in that regard.  He has not had any skin changes or skin breakdown here.  His appetite is good his weight is up and focusing on increased protein in the diet.  Otherwise he is been feeling reasonably well with no other complaints or concerns  Past Medical History:   Diagnosis Date     Advanced open-angle glaucoma      Atrial fibrillation (H)      CKD (chronic kidney disease), stage III (H)      Colon cancer (H)     Stage II-B colon cancer     Coronary artery disease     s/p CABG x 2, JEREMY x 2     Diverticulitis      Hyperlipidemia      Hypertension      Ischemic cardiomyopathy      MGUS (monoclonal gammopathy of unknown significance)      Nonsenile cataract     BE     Osteoporosis     left hip fracture     Polymorphic ventricular tachycardia (H)      Polymyalgia rheumatica (H)      PVD (posterior vitreous detachment), both eyes      S/P ablation of atrial flutter 14    CTI     Stented coronary artery       SVT (supraventricular tachycardia) (H)     PPM/AICD for NSVT     Upper leg DVT (deep venous thromboembolism), chronic (H)     Left     Weight loss, unintentional 2017    15 lb in 4 months     Past Surgical History:   Procedure Laterality Date     C CABG, ARTERY-VEIN, FOUR  2006    LIMA-LAD, SVG-Rt PDA, SVG-OM2, SVG-Diag 1     C CABG, VEIN, SINGLE  1994    1-vessel CABG, SVG->PDRCA      CATARACT IOL, RT/LT Bilateral      COLONOSCOPY  3/13/2014    Procedure: COMBINED COLONOSCOPY, SINGLE BIOPSY/POLYPECTOMY BY BIOPSY;;  Surgeon: Mary Gerber MD;  Location:  GI     COLONOSCOPY N/A 6/22/2015    Procedure: COLONOSCOPY;  Surgeon: Marilin Newman MD;  Location:  GI     COLONOSCOPY N/A 11/7/2018    Procedure: COMBINED COLONOSCOPY, SINGLE OR MULTIPLE BIOPSY/POLYPECTOMY BY BIOPSY;  Surgeon: Roberto Esteban MD;  Location:  GI     EP ICD GENERATOR CHANGE DUAL N/A 12/11/2018    Procedure: EP ICD Generator Change Dual;  Surgeon: Deedee Baird MD;  Location:  HEART CARDIAC CATH LAB     H ABLATION ATRIAL FLUTTER       HEART CATH DRUG ELUTING STENT PLACEMENT  4/19/2012    JEREMY x 2 to LCx     IMPLANT IMPLANTABLE CARDIOVERTER DEFIBRILLATOR  5-    ppm/aicd     KNEE SURGERY      right and left knee surgeries     LAPAROSCOPIC ASSISTED COLECTOMY Right 2/1/2019    Procedure: Laparoscopic Converted to Open Transverse Colectomy with Lysis of Adhesions;  Surgeon: Chanelle Guzmán MD;  Location:  OR     LAPAROSCOPIC ASSISTED COLECTOMY LEFT (DESCENDING)  4/8/2014    Procedure: LAPAROSCOPIC ASSISTED COLECTOMY LEFT (DESCENDING);  Hand assisted Laparoscopic Sigmoid Colectomy , Laparoscopic mobilization of spleenic flexure *Latex Allergy*Anesthesia General with Block;  Surgeon: Chanelle Guzmán MD;  Location:  OR     OPEN REDUCTION INTERNAL FIXATION RODDING INTRAMEDULLAR FEMUR FRACTURE TABLE Left 3/14/2019    Procedure: Open Reduction Internal Fixation Left Femur Intramedullar  Nailing;  Surgeon: Srikanth Avalos MD;  Location: [facility] OR     REPAIR VALVE MITRAL  2006     30-mm Medtronic Julian ring      SELECTIVE LASER TRABECULOPLASTY (SLT) OD (RIGHT EYE)  4/10, ,+13    RE     SELECTIVE LASER TRABECULOPLASTY (SLT) OS (LEFT EYE)  2012     SHOULDER SURGERY      right rotator cuff     Family History   Problem Relation Age of Onset     C.A.D. Father      Anesthesia Reaction No family hx of      Crohn's Disease No family hx of      Ulcerative Colitis No family hx of      Cancer - colorectal No family hx of      Macular Degeneration No family hx of      Cancer No family hx of         No known family hx of skin cancer     Melanoma No family hx of      Skin Cancer No family hx of      Social History     Socioeconomic History     Marital status:      Spouse name: None     Number of children: None     Years of education: None     Highest education level: None   Occupational History     None   Social Needs     Financial resource strain: None     Food insecurity:     Worry: None     Inability: None     Transportation needs:     Medical: None     Non-medical: None   Tobacco Use     Smoking status: Former Smoker     Types: Cigarettes, Cigars     Last attempt to quit: 1968     Years since quittin.9     Smokeless tobacco: Never Used   Substance and Sexual Activity     Alcohol use: Yes     Alcohol/week: 0.0 standard drinks     Comment: 2-3 drinks twice weekly     Drug use: No     Sexual activity: None   Lifestyle     Physical activity:     Days per week: None     Minutes per session: None     Stress: None   Relationships     Social connections:     Talks on phone: None     Gets together: None     Attends Rastafarian service: None     Active member of club or organization: None     Attends meetings of clubs or organizations: None     Relationship status: None     Intimate partner violence:     Fear of current or ex partner: None     Emotionally abused: None     Physically  abused: None     Forced sexual activity: None   Other Topics Concern     Parent/sibling w/ CABG, MI or angioplasty before 65F 55M? Not Asked   Social History Narrative     None       Exam:  /65   Pulse 66   Wt 69.9 kg (154 lb)   SpO2 99%   BMI 24.12 kg/m    154 lbs 0 oz  The patient is alert, oriented with a clear sensorium.   Skin shows no lesions or rashes and good turgor.   Head is normocephalic and atraumatic.       Lungs are clear.   Heart shows normal S1 and S2 without murmur or gallop.    Extremities show no edema, absent pedal pulses.    ASSESSMENT  1 paroxysmal atrial fibrillation on anticoagulation  2 osteoporosis on Fosamax  3 history of DVT went off anticoagulation  4 hypertension well controlled  5 hyperlipidemia  6 renal insufficiency needs follow-up  7 anemia likely secondary to above needs follow-up  8 urinary incontinence  9 peripheral arterial disease needs assessment  10 monoclonal gammopathy stable    Plan  We will refer him for the pelvic floor physical therapy.  We will have him get an arterial duplex ultrasound of his lower extremities.  We check his CBC and BMP today.  We will have him continue with the exercises that therapy and plan to follow-up in 6 months  Over 40 minutes spent with patient the majority in counseling and coordinating care.    This note was completed using Dragon voice recognition software.  Although reviewed after completion, some word and grammatical errors may occur.    Roberto Sarmiento MD  General Internal Medicine  [facility]  [phone]

## 2019-11-18 NOTE — PROGRESS NOTES
Addendum 19  INR done today 1.28.  No call made. [facility]                    ANTICOAGULATION FOLLOW-UP CLINIC VISIT    Patient Name:  Noe Florence  Date:  2019  Contact Type:  Telephone    SUBJECTIVE:  Patient Findings     Comments:   INR on 19 was 1.24.  Per note in Epic on 19, Sha saw a dietician and they talked about Boost/Ensure.  He was instructed to drink 2/day.  Not sure if he is doing this.         Clinical Outcomes     Comments:   INR on 19 was 1.24.  Per note in Epic on 19, Sha saw a dietician and they talked about Boost/Ensure.  He was instructed to drink 2/day.  Not sure if he is doing this.            OBJECTIVE    INR   Date Value Ref Range Status   2019 1.24 (H) 0.86 - 1.14 Final       ASSESSMENT / PLAN  INR assessment SUB    Recheck INR In: 1 WEEK    INR Location Clinic      Anticoagulation Summary  As of 2019    INR goal:   1.5-2.5   TTR:   61.3 % (3.3 y)   INR used for dosin.24! (2019)   Warfarin maintenance plan:   2.5 mg (5 mg x 0.5) every Thu; 5 mg (5 mg x 1) all other days   Full warfarin instructions:   : 7.5 mg; Otherwise 2.5 mg every Thu; 5 mg all other days   Weekly warfarin total:   32.5 mg   Plan last modified:   Danya Edwards RN (10/4/2019)   Next INR check:   2019   Priority:   High   Target end date:   Indefinite    Indications    Atrial fibrillation (H) [I48.91] [I48.91]  Long-term (current) use of anticoagulants [Z79.01] [Z79.01]  Deep vein thrombosis (DVT) (H) [I82.409]             Anticoagulation Episode Summary     INR check location:       Preferred lab:       Send INR reminders to:   [facility]    Comments:   Patient contact number: [phone] Hx of Falls/Bleed  Can leave results with Rica (friend)  [facility] see's pt. 19 Restarted Warfarin due to DVT.       Anticoagulation Care Providers     Provider Role Specialty Phone number    Frida Desai MD Responsible Cardiology  [phone]            See the Encounter Report to view Anticoagulation Flowsheet and Dosing Calendar (Go to Encounters tab in chart review, and find the Anticoagulation Therapy Visit)    Left message for patient with results and dosing recommendations. Asked patient to call back to report any missed doses, falls, signs and symptoms of bleeding or clotting, any changes in health, medication, or diet. Asked patient to call back with any questions or concerns.  On message, asked that they call the [facility] if Sha has increased his intake of Boost or Ensure or if he missed any doses of warfarin.      Danya Edwards RN       1:30 PM:  INR today is 1.28.  Rica called back.  Sha has been drinking one can of Boost daily for the last couple of years.  He keeps his greens consistent.  They will follow the recommendations above, and recheck INR in one week.  His maintenance dose of warfarin might need to be 5 mg daily.   MAS

## 2019-11-19 ENCOUNTER — HOSPITAL ENCOUNTER (OUTPATIENT)
Dept: OCCUPATIONAL THERAPY | Facility: CLINIC | Age: 84
Setting detail: THERAPIES SERIES
End: 2019-11-19
Attending: INTERNAL MEDICINE
Payer: COMMERCIAL

## 2019-11-19 PROCEDURE — 97110 THERAPEUTIC EXERCISES: CPT | Mod: GO | Performed by: OCCUPATIONAL THERAPIST

## 2019-11-19 PROCEDURE — 97530 THERAPEUTIC ACTIVITIES: CPT | Mod: GO | Performed by: OCCUPATIONAL THERAPIST

## 2019-11-20 LAB
ALBUMIN SERPL ELPH-MCNC: 4 G/DL (ref 3.7–5.1)
ALPHA1 GLOB SERPL ELPH-MCNC: 0.3 G/DL (ref 0.2–0.4)
ALPHA2 GLOB SERPL ELPH-MCNC: 0.8 G/DL (ref 0.5–0.9)
B-GLOBULIN SERPL ELPH-MCNC: 1.1 G/DL (ref 0.6–1)
GAMMA GLOB SERPL ELPH-MCNC: 1.1 G/DL (ref 0.7–1.6)
M PROTEIN SERPL ELPH-MCNC: 0.1 G/DL
PROT PATTERN SERPL ELPH-IMP: ABNORMAL

## 2019-11-25 ENCOUNTER — HOSPITAL ENCOUNTER (OUTPATIENT)
Dept: OCCUPATIONAL THERAPY | Facility: CLINIC | Age: 84
Setting detail: THERAPIES SERIES
End: 2019-11-25
Attending: INTERNAL MEDICINE
Payer: COMMERCIAL

## 2019-11-25 LAB — INTERPRETATION ECG - MUSE: NORMAL

## 2019-11-25 PROCEDURE — 97110 THERAPEUTIC EXERCISES: CPT | Mod: GO | Performed by: OCCUPATIONAL THERAPIST

## 2019-11-25 PROCEDURE — 97530 THERAPEUTIC ACTIVITIES: CPT | Mod: GO | Performed by: OCCUPATIONAL THERAPIST

## 2019-11-26 ENCOUNTER — THERAPY VISIT (OUTPATIENT)
Dept: PHYSICAL THERAPY | Facility: CLINIC | Age: 84
End: 2019-11-26
Payer: COMMERCIAL

## 2019-11-26 ENCOUNTER — ANTICOAGULATION THERAPY VISIT (OUTPATIENT)
Dept: ANTICOAGULATION | Facility: CLINIC | Age: 84
End: 2019-11-26

## 2019-11-26 DIAGNOSIS — I48.91 ATRIAL FIBRILLATION, UNSPECIFIED TYPE (H): ICD-10-CM

## 2019-11-26 DIAGNOSIS — R26.81 GAIT INSTABILITY: ICD-10-CM

## 2019-11-26 DIAGNOSIS — I48.91 ATRIAL FIBRILLATION (H): ICD-10-CM

## 2019-11-26 DIAGNOSIS — M62.81 GENERALIZED MUSCLE WEAKNESS: Primary | ICD-10-CM

## 2019-11-26 DIAGNOSIS — Z91.81 AT HIGH RISK FOR FALLS: ICD-10-CM

## 2019-11-26 DIAGNOSIS — I82.409 DEEP VEIN THROMBOSIS (DVT) (H): ICD-10-CM

## 2019-11-26 DIAGNOSIS — Z79.01 LONG TERM CURRENT USE OF ANTICOAGULANT THERAPY: ICD-10-CM

## 2019-11-26 LAB — INR PPP: 1.46 (ref 0.86–1.14)

## 2019-11-26 NOTE — PROGRESS NOTES
ANTICOAGULATION FOLLOW-UP CLINIC VISIT    Patient Name:  Noe Florence  Date:  2019  Contact Type:  Telephone    SUBJECTIVE:  Patient Findings     Comments:   Recommended patient take 5mg of Coumadin daily.        Clinical Outcomes     Comments:   Recommended patient take 5mg of Coumadin daily.           OBJECTIVE    INR   Date Value Ref Range Status   2019 1.46 (H) 0.86 - 1.14 Final       ASSESSMENT / PLAN  INR assessment SUB    Recheck INR In: 10 DAYS    INR Location Clinic      Anticoagulation Summary  As of 2019    INR goal:   1.5-2.5   TTR:   41.3 % (10.8 mo)   INR used for dosin.46! (2019)   Warfarin maintenance plan:   5 mg (5 mg x 1) every day   Full warfarin instructions:   5 mg every day   Weekly warfarin total:   35 mg   Plan last modified:   Ciro Sharp RN (2019)   Next INR check:   2019   Priority:   High   Target end date:   Indefinite    Indications    Atrial fibrillation (H) [I48.91] [I48.91]  Long-term (current) use of anticoagulants [Z79.01] [Z79.01]  Deep vein thrombosis (DVT) (H) [I82.409]             Anticoagulation Episode Summary     INR check location:       Preferred lab:       Send INR reminders to:   [facility]    Comments:   Patient contact number: [phone] Hx of Falls/Bleed  Can leave results with Rica (friend)  [facility] see's pt. 19 Restarted Warfarin due to DVT.       Anticoagulation Care Providers     Provider Role Specialty Phone number    Frida Desai MD [facility] Cardiology [phone]            See the Encounter Report to view Anticoagulation Flowsheet and Dosing Calendar (Go to Encounters tab in chart review, and find the Anticoagulation Therapy Visit)    INR/CFX/F2 RESULT:INR result is 1.46    ASSESSMENT:No Problems found. No Changes in Health, Medications, or diet. No Signs of bruising, bleeding or clotting.    DOSING ADJUSTMENT: Recommended 5mg daily    NEXT INR/FACTOR X OR FACTOR II:  at next PT appt    PROTOCOL FOLLOWED:goal 1.5-2.5    Ciro Sharp RN

## 2019-12-03 ENCOUNTER — HOSPITAL ENCOUNTER (OUTPATIENT)
Dept: OCCUPATIONAL THERAPY | Facility: CLINIC | Age: 84
Setting detail: THERAPIES SERIES
End: 2019-12-03
Attending: INTERNAL MEDICINE
Payer: COMMERCIAL

## 2019-12-03 PROCEDURE — 97530 THERAPEUTIC ACTIVITIES: CPT | Mod: GO | Performed by: OCCUPATIONAL THERAPIST

## 2019-12-03 PROCEDURE — 97110 THERAPEUTIC EXERCISES: CPT | Mod: GO | Performed by: OCCUPATIONAL THERAPIST

## 2019-12-06 ENCOUNTER — ANCILLARY PROCEDURE (OUTPATIENT)
Dept: ULTRASOUND IMAGING | Facility: CLINIC | Age: 84
End: 2019-12-06
Attending: INTERNAL MEDICINE
Payer: COMMERCIAL

## 2019-12-06 DIAGNOSIS — R09.89 DECREASED PEDAL PULSES: ICD-10-CM

## 2019-12-10 ENCOUNTER — HOSPITAL ENCOUNTER (OUTPATIENT)
Dept: OCCUPATIONAL THERAPY | Facility: CLINIC | Age: 84
Setting detail: THERAPIES SERIES
End: 2019-12-10
Attending: INTERNAL MEDICINE
Payer: COMMERCIAL

## 2019-12-10 ENCOUNTER — ANTICOAGULATION THERAPY VISIT (OUTPATIENT)
Dept: ANTICOAGULATION | Facility: CLINIC | Age: 84
End: 2019-12-10

## 2019-12-10 DIAGNOSIS — Z79.01 LONG TERM CURRENT USE OF ANTICOAGULANT THERAPY: ICD-10-CM

## 2019-12-10 DIAGNOSIS — I48.91 ATRIAL FIBRILLATION (H): ICD-10-CM

## 2019-12-10 DIAGNOSIS — I48.91 ATRIAL FIBRILLATION, UNSPECIFIED TYPE (H): ICD-10-CM

## 2019-12-10 DIAGNOSIS — I82.409 DEEP VEIN THROMBOSIS (DVT) (H): ICD-10-CM

## 2019-12-10 DIAGNOSIS — I82.409 DVT (DEEP VENOUS THROMBOSIS) (H): ICD-10-CM

## 2019-12-10 DIAGNOSIS — I48.20 CHRONIC ATRIAL FIBRILLATION (H): Primary | ICD-10-CM

## 2019-12-10 LAB — INR PPP: 1.97 (ref 0.86–1.14)

## 2019-12-10 PROCEDURE — 97530 THERAPEUTIC ACTIVITIES: CPT | Mod: GO | Performed by: OCCUPATIONAL THERAPIST

## 2019-12-10 PROCEDURE — 97110 THERAPEUTIC EXERCISES: CPT | Mod: GO | Performed by: OCCUPATIONAL THERAPIST

## 2019-12-10 RX ORDER — WARFARIN SODIUM 5 MG/1
5 TABLET ORAL DAILY
Qty: 90 TABLET | Refills: 1 | Status: SHIPPED | OUTPATIENT
Start: 2019-12-10 | End: 2020-06-02

## 2019-12-10 NOTE — PROGRESS NOTES
ANTICOAGULATION FOLLOW-UP CLINIC VISIT    Patient Name:  Noe Florence  Date:  12/10/2019  Contact Type:  Telephone    SUBJECTIVE:         OBJECTIVE    INR   Date Value Ref Range Status   12/10/2019 1.97 (H) 0.86 - 1.14 Final       ASSESSMENT / PLAN  INR assessment THER    Recheck INR In: 3 WEEKS    INR Location Clinic      Anticoagulation Summary  As of 12/10/2019    INR goal:   1.5-2.5   TTR:   42.8 % (10.8 mo)   INR used for dosin.97 (12/10/2019)   Warfarin maintenance plan:   5 mg (5 mg x 1) every day   Full warfarin instructions:   5 mg every day   Weekly warfarin total:   35 mg   Plan last modified:   Ciro Sharp RN (2019)   Next INR check:   2019   Priority:   High   Target end date:   Indefinite    Indications    Atrial fibrillation (H) [I48.91] [I48.91]  Long-term (current) use of anticoagulants [Z79.01] [Z79.01]  Deep vein thrombosis (DVT) (H) [I82.409]             Anticoagulation Episode Summary     INR check location:       Preferred lab:       Send INR reminders to:   [facility]    Comments:   Patient contact number: [phone] Hx of Falls/Bleed  Can leave results with Rica (friend)  [facility] see's pt. 19 Restarted Warfarin due to DVT.       Anticoagulation Care Providers     Provider Role Specialty Phone number    Frida Desai MD Responsible Cardiology [phone]            See the Encounter Report to view Anticoagulation Flowsheet and Dosing Calendar (Go to Encounters tab in chart review, and find the Anticoagulation Therapy Visit)  Left message for patient with results and dosing recommendations. Asked patient to call back to report any missed doses, falls, signs and symptoms of bleeding or clotting, any changes in health, medication, or diet. Asked patient to call back with any questions or concerns.      Leena Abebe RN

## 2019-12-12 ENCOUNTER — THERAPY VISIT (OUTPATIENT)
Dept: PHYSICAL THERAPY | Facility: CLINIC | Age: 84
End: 2019-12-12
Attending: INTERNAL MEDICINE
Payer: COMMERCIAL

## 2019-12-12 DIAGNOSIS — R35.0 URINARY FREQUENCY: ICD-10-CM

## 2019-12-12 DIAGNOSIS — N39.41 URGENCY INCONTINENCE: ICD-10-CM

## 2019-12-12 PROBLEM — M62.89 PELVIC FLOOR DYSFUNCTION: Status: RESOLVED | Noted: 2019-07-19 | Resolved: 2019-12-12

## 2019-12-12 PROCEDURE — 97535 SELF CARE MNGMENT TRAINING: CPT | Mod: GP | Performed by: PHYSICAL THERAPIST

## 2019-12-12 PROCEDURE — 97112 NEUROMUSCULAR REEDUCATION: CPT | Mod: GP | Performed by: PHYSICAL THERAPIST

## 2019-12-12 PROCEDURE — 97162 PT EVAL MOD COMPLEX 30 MIN: CPT | Mod: GP | Performed by: PHYSICAL THERAPIST

## 2019-12-12 NOTE — PROGRESS NOTES
[facility] Initial Evaluation  Subjective:  The history is provided by the patient. No  was used.   Noe Florence being seen for incontinence.   Date of Onset: 11/18/19, date of order. Problem occurred: after surgery of fracture femur  and reported as 1/10 on pain scale. General health as reported by patient is good.          Primary job tasks include:  Prolonged sitting.           Patient is Retired.         History of current episode:    Onset date/MD order: 11/18/19, date of order.    CC/Present symptoms: Pt was previously seen in pelvic floor physical therapy this July, however, was unable to attend consistently due to other health concerns. Pt presents today due to urinary urgency and urinary incontinence symptoms. Pt is no longer having urgency symptoms, however, he is leaking more throughout the day and at night. 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 Pt also needs help with wiping of stool. Pt's wife is present during session and provides the majority of the subjective information.   PMHx: HTN, HLD, CKD3, CAD s/p CABG x 2 JEREMY x2, Vtach s/p ICD placement, atrial fibrillation, Benign prostatic hyperplasia, osteoarthritis  Pain rating (0-10): 0/10  Conditioning is improving/unchanging/worsening: unchanging    Current occupation: none  Current activity: pt uses 2WW for ambulation, pt is walking and doing stairs 3 days a week, OT/PT exercise  Goals: control of bladder, not have to use depends  Red flags:none    Urination:  Do you get the urge to urinate? yes  Do you leak on the way to the bathroom or with a strong urge to void? Yes, pt will ignore first urge   Do you leak with cough,sneeze, jumping, running? no  Any other activities that cause leaking?  Not sure  Do you have triggers that make you feel you can't wait to go to the bathroom?  What are they? Not sure.  Type of pad and number used per day? 4 depends, 7 pads   When you leak  what is the amount? Small to large, varies  How long can you delay the need to urinate? hours.   Frequency of daytime urination:every 2-3 hours  Can you stop the flow of urine when on the toilet? Not sure  Is the volume of urine passed usually:  (8sec rule= 250ml with average bladder storing 400-600ml) medium  Do you strain to pass urine? no  Do you have a slow or hesitant urinary stream? irregular  Do you have difficulty initiating the urine stream? no  Is urination painful? no  How many bladder infections have you had in last 12 months?0     Bowel habits:  Frequency of bowel movements? 1-2 times a day  Consistancy of stool?  [place] Stool Scale type 2, varies quite a bit  Do you ignore the urge to defecate? no  Do you strain to pass stool? Yes  Do you feel constipated/bloated? Not particularly  Pt's wife report's strong urgency with bowel movements and occasional seepage.  Pt takes fiber one and grapenuts, and veggies each day.    Pelvic Pain:  Do you have any pelvic pain? no  Are you sexually active? n/a  Have you ever been worried for your physical safety? no  Have you practiced the PF(kegel) exercises for 4 or more weeks? no    Treatment/Education provided this session: please see flow sheet for details              Objective:  Standing Alignment:    Cervical/Thoracic:  Thoracic kyphosis increased and forward head  Shoulder/UE:  Rounded shoulders              Gait:  Varying step lengths, slow speed    Assistive Devices:  Walker      Flexibility/Screens:       Lower Extremity:  Decreased left lower extremity flexibility:Hip Flexors    Decreased right lower extremity flexibility:  Hip Flexors                                       Pelvic Dysfunction Evaluation:                      SEMG Biofeedback:  Semg biofeedback pelvic: poor PFM and abdominal muscle coordination.  Equipment:  Surface EMG  Surface electrode placement--Abdominals: transvers abdominis  Suraface electrode placement--Perianal:  Perineals  Baseline  EMG PM:  1.1mV  Baseline EMG Abdominals:  5.0mV  Peak pelvic muscle contraction:  3.0mV    EMG interpretation to fatigue:  Less than3 seconds  Position:  Sidelying          Hip Evaluation          Functional Testing:  Functional test hip: Pt requires min A with sit to side lying of LEs.        Quad:      Bilateral leg squat:  Requires UE support for stability  Mild loss of control                  General     ROS    Assessment/Plan:    Patient is a 84 year old male with pelvic complaints.    Patient has the following significant findings with corresponding treatment plan.                Diagnosis 1:  Pelvic floor dysfunction  Decreased ROM/flexibility - manual therapy, therapeutic exercise, therapeutic activity and home program  Decreased strength - therapeutic exercise, therapeutic activities and home program  Impaired balance - neuro re-education, therapeutic activities, adaptive equipment/assistive device and home program  Impaired gait - gait training, assistive devices and home program  Impaired muscle performance - biofeedback, electric stimulation, neuro re-education and home program  Impaired posture - neuro re-education, therapeutic activities and home program    Therapy Evaluation Codes:   1) History comprised of:   Personal factors that impact the plan of care:      Age.    Comorbidity factors that impact the plan of care are:      Bowel/bladder changes and Heart problems.     Medications impacting care: Anti-depressant and High blood pressure.  2) Examination of Body Systems comprised of:   Body structures and functions that impact the plan of care:      Pelvis.   Activity limitations that impact the plan of care are:      Walking, Fecal incontinence and Urinary incontinence.  3) Clinical presentation characteristics are:   Evolving/Changing.  4) Decision-Making    Moderate complexity using standardized patient assessment instrument and/or measureable assessment of functional outcome.  Cumulative Therapy  Evaluation is: Moderate complexity.    Previous and current functional limitations:  (See Goal Flow Sheet for this information)    Short term and Long term goals: (See Goal Flow Sheet for this information)     Communication ability:  Patient appears to be able to clearly communicate and understand verbal and written communication and follow directions correctly.  Treatment Explanation - The following has been discussed with the patient:   RX ordered/plan of care  Anticipated outcomes  Possible risks and side effects  This patient would benefit from PT intervention to resume normal activities.   Rehab potential is good.    Frequency:  1 X week, once daily  Duration:  for 8 weeks  Discharge Plan:  Achieve all LTG.  Independent in home treatment program.  Reach maximal therapeutic benefit.    Please refer to the daily flowsheet for treatment today, total treatment time and time spent performing 1:1 timed codes.

## 2019-12-16 ENCOUNTER — HEALTH MAINTENANCE LETTER (OUTPATIENT)
Age: 84
End: 2019-12-16

## 2019-12-16 ENCOUNTER — THERAPY VISIT (OUTPATIENT)
Dept: PHYSICAL THERAPY | Facility: CLINIC | Age: 84
End: 2019-12-16
Payer: COMMERCIAL

## 2019-12-16 DIAGNOSIS — Z91.81 AT HIGH RISK FOR FALLS: ICD-10-CM

## 2019-12-16 DIAGNOSIS — R26.81 GAIT INSTABILITY: ICD-10-CM

## 2019-12-16 DIAGNOSIS — M62.81 GENERALIZED MUSCLE WEAKNESS: Primary | ICD-10-CM

## 2019-12-17 ENCOUNTER — HOSPITAL ENCOUNTER (OUTPATIENT)
Dept: OCCUPATIONAL THERAPY | Facility: CLINIC | Age: 84
Setting detail: THERAPIES SERIES
End: 2019-12-17
Attending: INTERNAL MEDICINE
Payer: COMMERCIAL

## 2019-12-17 PROCEDURE — 97530 THERAPEUTIC ACTIVITIES: CPT | Mod: GO | Performed by: OCCUPATIONAL THERAPIST

## 2019-12-17 PROCEDURE — 97110 THERAPEUTIC EXERCISES: CPT | Mod: GO | Performed by: OCCUPATIONAL THERAPIST

## 2019-12-19 ENCOUNTER — THERAPY VISIT (OUTPATIENT)
Dept: PHYSICAL THERAPY | Facility: CLINIC | Age: 84
End: 2019-12-19
Payer: COMMERCIAL

## 2019-12-19 DIAGNOSIS — M62.89 PELVIC FLOOR DYSFUNCTION: ICD-10-CM

## 2019-12-19 PROCEDURE — 97112 NEUROMUSCULAR REEDUCATION: CPT | Mod: GP | Performed by: PHYSICAL THERAPIST

## 2019-12-19 NOTE — PROGRESS NOTES
"   12/17/19 1200   Signing Clinician's Name / Credentials   Signing clinician's name / credentials CELINA Crowder, Rosalba Sanchez OTR/L,ATP   Session Number   Session Number 9/10   Additional Session Number Discharge Summary   Authorization status dottie Rivas received    Session Tracking and Supervision   Supervision Direct supervision provided   Progress/Recertification   Recertification Due 12/31/19   Adult OT Eval Goals   OT Eval Goals (Adult) 2;1    OT Goal 1   Goal Identifier UE Endurance   Goal Description Pt will participate in 10 minutes of continuous activty to improve UE endurance needed for OH daily care activities   Target Date 12/31/19   Date Met 12/17/19    OT Goal 2   Goal Identifier UE Strength   Goal Description Pt will demo (I) with BUE HEP by increasing R shoulder strength by 1/2 grade and decreasing 9 hole peg test scores in order to assist with return to tennis (ball bouncing).    Target Date 12/31/19   Date Met 12/17/19   Subjective Report   Subjective Report Pt's spouse stated, \"he has gotten really good at these ones\" when pt was completing bicep curls and tricep extensions   Objective Measures   Objective Measures Objective Measure 2   Objective Measure 1   Objective Measure UE strength   Details (R) Shoulder flexion: 3-, Shoulder abduction: 4-, R elbow flexion/ extension 4+   Objective Measure 2   Objective Measure Hand strength    Details R hand 53#, L hand 61#   Therapeutic Procedure/Exercise   Therapeutic Procedure: strength, endurance, ROM, flexibillity minutes (29427) 35   Skilled Intervention Training and education on HEP to improve UE strength and endurance need for daily cares   Patient Response Pt and caregiver eager to continue to complete HEP and receptive to suggestions made by therapist.    Treatment Detail Reviewed supine UE exercises involving shoulder flexion AAROM with hold at end range for 10 seconds x 4 reps, scap retraction exercises with arms externally rotated and " hands placed behind head while bringing elbow together, and scap protraction exercises with 2# weight in L and R while punching towards the ceiling. Review EOM exercises while sitting upright involving R shoulder abduction AAROM with help from PCA  with stretch held at end range and use of 1# weight for L shoulder abduction. Used 2# weight in L and R hand for bicep curls and tricep extensions for 10 reps x 1 set. Used red t-band to complete scap stabilizing exercises including lat pulldowns and rows. Further completed L and R internal/ external rotation using t-band with use of yellow t-band only for R external rotation.    Progress Goals progressing   Therapeutic Activity   Therapeutic Activity Minutes (43324) 10   Skilled Intervention Re-assessment of UE and  strength   Patient Response Increase in L  strength by 8#, increase in R  strength by 9#; R shoulder flexion remained the same at 3-, R shoulder abduction increased from 3- to 4-, R elbow flexion/ extension icnreased from 4 to 4+   Treatment Detail Therapist completed MMT of shoulder, elbow, and wrist to re-assess changes in muscle strength following 9 treatment sessions. Also assessed  strength using the dynamometer    Progress Goals progressing   Education   Learner Patient;Caregiver   Readiness Acceptance   Method Demonstration;Explanation   Response Verbalizes understanding;Demonstrates understanding   Plan   Plan for next session D/c 12/17   Comments   Comments DISCHARGE NOTE: Pt participated in 9 skilled treatment sessions addressing UE strength and endurance for improved ease with daily cares and sustained UE activity. Pt made progress in therapy as he has met goal #1: Pt will participate in 10 minutes of continuous activity to improve UE endurance needed for OH daily care activities, aspt participated in up to 12 minutes of continuous activity. Pt also met goal #2: Pt will demo (I) with BUE HEP by increasing R shoulder strength by 1/2  grade and decreasing 9 hole peg test scores in order to assist with return to tennis (ball bouncing), as he demonstrated an Increase in L  strength by 8#, increase in R  strength by 9#; R shoulder abduction increased from 3- to 4-, and R elbow flexion/ extension increased from 4 to 4+. Pt has also been provided several home exercise resources to continue building up UE endurance and strength. Pt and spouse note improvements as well.    Total Session Time   Timed Code Treatment Minutes 45   Total Treatment Time (sum of timed and untimed services) 45

## 2019-12-23 ENCOUNTER — THERAPY VISIT (OUTPATIENT)
Dept: PHYSICAL THERAPY | Facility: CLINIC | Age: 84
End: 2019-12-23
Payer: COMMERCIAL

## 2019-12-23 DIAGNOSIS — Z91.81 AT HIGH RISK FOR FALLS: ICD-10-CM

## 2019-12-23 DIAGNOSIS — M62.81 GENERALIZED MUSCLE WEAKNESS: Primary | ICD-10-CM

## 2019-12-23 DIAGNOSIS — R26.81 GAIT INSTABILITY: ICD-10-CM

## 2019-12-23 NOTE — DISCHARGE INSTRUCTIONS
12/23/2019    - Sha should be stretching his hamstrings with the belt in the morning before he gets out of bed everyday. He should be able to do this on his own. If he needs a reminder, you could try a sheet with directions for him to look at in the morning.  - Sha should also be doing his arm stretch every morning before getting out of bed. He can also do this on his own.  * Recommend having a nightstand or something to hold the gait belt on in order for him to complete his leg stretch on his own  * Continue to use the walker as your primary device for safety this winter. Keep walking with the cane (supervised)- increase the amount of time as able  * Recommend coming back to PT in the spring for a check up

## 2019-12-23 NOTE — PROGRESS NOTES
Outpatient Physical Therapy Discharge Note     Patient: Noe Florence  : 1935    Beginning/End Dates of Reporting Period:  2019 to 2019    Referring Provider: Roberto Sarmiento MD    Therapy Diagnosis: imbalance     Client Self Report: Things are still going well- keep working on the walking, I am up to 15 minutes on the cane.    Goals:  Goal Identifier Gait   Goal Description Patient will ambulated with SEC at least 150 feet, modifed independent, for safe household gait   Target Date 19   Date Met      Progress: Patient limited to mod I status with SEC due to inconsistent performances based on posture. Patient requires external reminders/cueing for stretching which places him at an increased risk for falls using SEC if unable to perform appropriate stretching for postural alignment. Improved from CGA to supervision from initial eval.     Goal Identifier Stairs   Goal Description Patient will negotiate 14 stairs with 1HR/SEC modified independent in order to safely negotiate up to bedroom of house   Target Date 19   Date Met  19   Progress:     Goal Identifier HEP   Goal Description Patient will be independent in HEP for maintenance of therapy gains at d/c   Target Date 19   Date Met  19   Progress:       Progress Toward Goals:   Progress this reporting period: Patient with significant improvement in postural alignment and B hamstring length since initial evaluation which has improved his overall stability with gait. Patient demonstrates improved BLE strength as demonstrated by stair negotiation independence and reported ability to perform 154 stairs with HR without seated rest break.     Plan:  Discharge from therapy.    Discharge:    Reason for Discharge: Patient is independent with HEP and understands importance of postural alignment in order to improve independence.     Equipment Issued: None. Recommend WW as primary mode of independence; SEC use with  supervision    Discharge Plan: Patient to continue home program. Follow up with balance PT in spring to continue to work on community mobility independence

## 2020-01-02 ENCOUNTER — TELEPHONE (OUTPATIENT)
Dept: GASTROENTEROLOGY | Facility: CLINIC | Age: 85
End: 2020-01-02

## 2020-01-02 ENCOUNTER — ANTICOAGULATION THERAPY VISIT (OUTPATIENT)
Dept: ANTICOAGULATION | Facility: CLINIC | Age: 85
End: 2020-01-02

## 2020-01-02 DIAGNOSIS — I48.91 ATRIAL FIBRILLATION, UNSPECIFIED TYPE (H): ICD-10-CM

## 2020-01-02 DIAGNOSIS — Z79.01 LONG TERM CURRENT USE OF ANTICOAGULANT THERAPY: ICD-10-CM

## 2020-01-02 DIAGNOSIS — I48.91 ATRIAL FIBRILLATION (H): ICD-10-CM

## 2020-01-02 DIAGNOSIS — I82.409 DEEP VEIN THROMBOSIS (DVT) (H): ICD-10-CM

## 2020-01-02 DIAGNOSIS — C18.9 COLON CANCER (H): Primary | ICD-10-CM

## 2020-01-02 LAB — INR PPP: 1.95 (ref 0.86–1.14)

## 2020-01-02 RX ORDER — POLYETHYLENE GLYCOL 3350 17 G/17G
238 POWDER, FOR SOLUTION ORAL SEE ADMIN INSTRUCTIONS
Qty: 238 G | Refills: 0 | Status: SHIPPED | OUTPATIENT
Start: 2020-01-02 | End: 2020-11-02

## 2020-01-02 NOTE — PROGRESS NOTES
ANTICOAGULATION FOLLOW-UP CLINIC VISIT    Patient Name:  Noe Florence  Date:  2020  Contact Type:  Telephone    SUBJECTIVE:  Patient Findings         Clinical Outcomes     Negatives:   Major bleeding event, Thromboembolic event, Anticoagulation-related hospital admission, Anticoagulation-related ED visit, Anticoagulation-related fatality           OBJECTIVE    INR   Date Value Ref Range Status   2020 1.95 (H) 0.86 - 1.14 Final       ASSESSMENT / PLAN  INR assessment THER    Recheck INR In: 3 WEEKS    INR Location Clinic      Anticoagulation Summary  As of 2020    INR goal:   1.5-2.5   TTR:   45.6 % (10.8 mo)   INR used for dosin.95 (2020)   Warfarin maintenance plan:   5 mg (5 mg x 1) every day   Full warfarin instructions:   5 mg every day   Weekly warfarin total:   35 mg   No change documented:   Shahid Collins   Plan last modified:   Ciro Sharp RN (2019)   Next INR check:   2020   Priority:   Maintenance   Target end date:   Indefinite    Indications    Atrial fibrillation (H) [I48.91] [I48.91]  Long-term (current) use of anticoagulants [Z79.01] [Z79.01]  Deep vein thrombosis (DVT) (H) [I82.409]             Anticoagulation Episode Summary     INR check location:       Preferred lab:       Send INR reminders to:   [facility]    Comments:   Patient contact number: [phone] Hx of Falls/Bleed  Can leave results with Rica (friend)  [facility] see[facility] pt. 19 Restarted Warfarin due to DVT.       Anticoagulation Care Providers     Provider Role Specialty Phone number    Frida Desai MD [facility] Cardiology [phone]            See the Encounter Report to view Anticoagulation Flowsheet and Dosing Calendar (Go to Encounters tab in chart review, and find the Anticoagulation Therapy Visit)    Spoke with patient's partner Rica. Gave them Bill's lab results and new warfarin recommendation.  No changes in health, medication, or diet. No  missed doses, no falls. No signs or symptoms of bleed or clotting.  INR check planned for 1/21/2020 due to having another appointment on that date, otherwise will get it checked later that week.      Shahid Collins

## 2020-01-06 DIAGNOSIS — E78.5 HYPERLIPIDEMIA, UNSPECIFIED HYPERLIPIDEMIA TYPE: Primary | ICD-10-CM

## 2020-01-08 RX ORDER — ATORVASTATIN CALCIUM 40 MG/1
40 TABLET, FILM COATED ORAL DAILY
Qty: 90 TABLET | Refills: 3 | Status: SHIPPED | OUTPATIENT
Start: 2020-01-08 | End: 2021-05-03

## 2020-01-09 ENCOUNTER — TELEPHONE (OUTPATIENT)
Dept: SURGERY | Facility: CLINIC | Age: 85
End: 2020-01-09

## 2020-01-09 ENCOUNTER — ANCILLARY PROCEDURE (OUTPATIENT)
Dept: CARDIOLOGY | Facility: CLINIC | Age: 85
End: 2020-01-09
Attending: INTERNAL MEDICINE
Payer: COMMERCIAL

## 2020-01-09 DIAGNOSIS — I47.20 VENTRICULAR TACHYCARDIA (H): ICD-10-CM

## 2020-01-09 PROCEDURE — 93296 REM INTERROG EVL PM/IDS: CPT | Mod: ZF

## 2020-01-09 PROCEDURE — 93295 DEV INTERROG REMOTE 1/2/MLT: CPT | Mod: ZP | Performed by: INTERNAL MEDICINE

## 2020-01-09 NOTE — TELEPHONE ENCOUNTER
1/9/2020:  Left voicemail message to call back regarding scheduling   (colonoscopy at [address] - 1/31?)

## 2020-01-14 ENCOUNTER — THERAPY VISIT (OUTPATIENT)
Dept: PHYSICAL THERAPY | Facility: CLINIC | Age: 85
End: 2020-01-14
Payer: COMMERCIAL

## 2020-01-14 DIAGNOSIS — M62.89 PELVIC FLOOR DYSFUNCTION: ICD-10-CM

## 2020-01-14 PROCEDURE — 97535 SELF CARE MNGMENT TRAINING: CPT | Mod: GP | Performed by: PHYSICAL THERAPIST

## 2020-01-14 PROCEDURE — 97112 NEUROMUSCULAR REEDUCATION: CPT | Mod: GP | Performed by: PHYSICAL THERAPIST

## 2020-01-15 LAB
MDC_IDC_LEAD_IMPLANT_DT: NORMAL
MDC_IDC_LEAD_IMPLANT_DT: NORMAL
MDC_IDC_LEAD_LOCATION: NORMAL
MDC_IDC_LEAD_LOCATION: NORMAL
MDC_IDC_LEAD_LOCATION_DETAIL_1: NORMAL
MDC_IDC_LEAD_LOCATION_DETAIL_1: NORMAL
MDC_IDC_LEAD_MFG: NORMAL
MDC_IDC_LEAD_MFG: NORMAL
MDC_IDC_LEAD_MODEL: NORMAL
MDC_IDC_LEAD_MODEL: NORMAL
MDC_IDC_LEAD_POLARITY_TYPE: NORMAL
MDC_IDC_LEAD_POLARITY_TYPE: NORMAL
MDC_IDC_LEAD_SERIAL: NORMAL
MDC_IDC_LEAD_SERIAL: NORMAL
MDC_IDC_MSMT_BATTERY_DTM: NORMAL
MDC_IDC_MSMT_BATTERY_REMAINING_LONGEVITY: 110 MO
MDC_IDC_MSMT_BATTERY_RRT_TRIGGER: 2.73
MDC_IDC_MSMT_BATTERY_STATUS: NORMAL
MDC_IDC_MSMT_BATTERY_VOLTAGE: 3.01 V
MDC_IDC_MSMT_CAP_CHARGE_DTM: NORMAL
MDC_IDC_MSMT_CAP_CHARGE_ENERGY: 18 J
MDC_IDC_MSMT_CAP_CHARGE_TIME: 3.92
MDC_IDC_MSMT_CAP_CHARGE_TYPE: NORMAL
MDC_IDC_MSMT_LEADCHNL_RA_IMPEDANCE_VALUE: 456 OHM
MDC_IDC_MSMT_LEADCHNL_RA_PACING_THRESHOLD_AMPLITUDE: 0.62 V
MDC_IDC_MSMT_LEADCHNL_RA_PACING_THRESHOLD_PULSEWIDTH: 0.4 MS
MDC_IDC_MSMT_LEADCHNL_RA_SENSING_INTR_AMPL: 1.3 MV
MDC_IDC_MSMT_LEADCHNL_RV_IMPEDANCE_VALUE: 304 OHM
MDC_IDC_MSMT_LEADCHNL_RV_IMPEDANCE_VALUE: 361 OHM
MDC_IDC_MSMT_LEADCHNL_RV_PACING_THRESHOLD_AMPLITUDE: 0.75 V
MDC_IDC_MSMT_LEADCHNL_RV_PACING_THRESHOLD_PULSEWIDTH: 0.4 MS
MDC_IDC_MSMT_LEADCHNL_RV_SENSING_INTR_AMPL: 8.9 MV
MDC_IDC_PG_IMPLANT_DTM: NORMAL
MDC_IDC_PG_MFG: NORMAL
MDC_IDC_PG_MODEL: NORMAL
MDC_IDC_PG_SERIAL: NORMAL
MDC_IDC_PG_TYPE: NORMAL
MDC_IDC_SESS_CLINIC_NAME: NORMAL
MDC_IDC_SESS_DTM: NORMAL
MDC_IDC_SESS_TYPE: NORMAL
MDC_IDC_SET_BRADY_AT_MODE_SWITCH_RATE: 171 {BEATS}/MIN
MDC_IDC_SET_BRADY_HYSTRATE: NORMAL
MDC_IDC_SET_BRADY_LOWRATE: 60 {BEATS}/MIN
MDC_IDC_SET_BRADY_MAX_SENSOR_RATE: 130 {BEATS}/MIN
MDC_IDC_SET_BRADY_MAX_TRACKING_RATE: 130 {BEATS}/MIN
MDC_IDC_SET_BRADY_MODE: NORMAL
MDC_IDC_SET_BRADY_PAV_DELAY_LOW: 180 MS
MDC_IDC_SET_BRADY_SAV_DELAY_LOW: 150 MS
MDC_IDC_SET_LEADCHNL_RA_PACING_AMPLITUDE: 1.5 V
MDC_IDC_SET_LEADCHNL_RA_PACING_ANODE_ELECTRODE_1: NORMAL
MDC_IDC_SET_LEADCHNL_RA_PACING_ANODE_LOCATION_1: NORMAL
MDC_IDC_SET_LEADCHNL_RA_PACING_CAPTURE_MODE: NORMAL
MDC_IDC_SET_LEADCHNL_RA_PACING_CATHODE_ELECTRODE_1: NORMAL
MDC_IDC_SET_LEADCHNL_RA_PACING_CATHODE_LOCATION_1: NORMAL
MDC_IDC_SET_LEADCHNL_RA_PACING_POLARITY: NORMAL
MDC_IDC_SET_LEADCHNL_RA_PACING_PULSEWIDTH: 0.4 MS
MDC_IDC_SET_LEADCHNL_RA_SENSING_ANODE_ELECTRODE_1: NORMAL
MDC_IDC_SET_LEADCHNL_RA_SENSING_ANODE_LOCATION_1: NORMAL
MDC_IDC_SET_LEADCHNL_RA_SENSING_CATHODE_ELECTRODE_1: NORMAL
MDC_IDC_SET_LEADCHNL_RA_SENSING_CATHODE_LOCATION_1: NORMAL
MDC_IDC_SET_LEADCHNL_RA_SENSING_POLARITY: NORMAL
MDC_IDC_SET_LEADCHNL_RA_SENSING_SENSITIVITY: 0.3 MV
MDC_IDC_SET_LEADCHNL_RV_PACING_AMPLITUDE: 2 V
MDC_IDC_SET_LEADCHNL_RV_PACING_ANODE_ELECTRODE_1: NORMAL
MDC_IDC_SET_LEADCHNL_RV_PACING_ANODE_LOCATION_1: NORMAL
MDC_IDC_SET_LEADCHNL_RV_PACING_CAPTURE_MODE: NORMAL
MDC_IDC_SET_LEADCHNL_RV_PACING_CATHODE_ELECTRODE_1: NORMAL
MDC_IDC_SET_LEADCHNL_RV_PACING_CATHODE_LOCATION_1: NORMAL
MDC_IDC_SET_LEADCHNL_RV_PACING_POLARITY: NORMAL
MDC_IDC_SET_LEADCHNL_RV_PACING_PULSEWIDTH: 0.4 MS
MDC_IDC_SET_LEADCHNL_RV_SENSING_ANODE_ELECTRODE_1: NORMAL
MDC_IDC_SET_LEADCHNL_RV_SENSING_ANODE_LOCATION_1: NORMAL
MDC_IDC_SET_LEADCHNL_RV_SENSING_CATHODE_ELECTRODE_1: NORMAL
MDC_IDC_SET_LEADCHNL_RV_SENSING_CATHODE_LOCATION_1: NORMAL
MDC_IDC_SET_LEADCHNL_RV_SENSING_POLARITY: NORMAL
MDC_IDC_SET_LEADCHNL_RV_SENSING_SENSITIVITY: 0.45 MV
MDC_IDC_SET_ZONE_DETECTION_BEATS_DENOMINATOR: 24 {BEATS}
MDC_IDC_SET_ZONE_DETECTION_BEATS_NUMERATOR: 18 {BEATS}
MDC_IDC_SET_ZONE_DETECTION_INTERVAL: 300 MS
MDC_IDC_SET_ZONE_DETECTION_INTERVAL: 320 MS
MDC_IDC_SET_ZONE_DETECTION_INTERVAL: 350 MS
MDC_IDC_SET_ZONE_DETECTION_INTERVAL: 430 MS
MDC_IDC_SET_ZONE_DETECTION_INTERVAL: NORMAL
MDC_IDC_SET_ZONE_TYPE: NORMAL
MDC_IDC_STAT_AT_BURDEN_PERCENT: 0 %
MDC_IDC_STAT_AT_DTM_END: NORMAL
MDC_IDC_STAT_AT_DTM_START: NORMAL
MDC_IDC_STAT_BRADY_AP_VP_PERCENT: 11.04 %
MDC_IDC_STAT_BRADY_AP_VS_PERCENT: 55.89 %
MDC_IDC_STAT_BRADY_AS_VP_PERCENT: 1.87 %
MDC_IDC_STAT_BRADY_AS_VS_PERCENT: 31.2 %
MDC_IDC_STAT_BRADY_DTM_END: NORMAL
MDC_IDC_STAT_BRADY_DTM_START: NORMAL
MDC_IDC_STAT_BRADY_RA_PERCENT_PACED: 64.17 %
MDC_IDC_STAT_BRADY_RV_PERCENT_PACED: 13.49 %
MDC_IDC_STAT_EPISODE_RECENT_COUNT: 0
MDC_IDC_STAT_EPISODE_RECENT_COUNT_DTM_END: NORMAL
MDC_IDC_STAT_EPISODE_RECENT_COUNT_DTM_START: NORMAL
MDC_IDC_STAT_EPISODE_TOTAL_COUNT: 0
MDC_IDC_STAT_EPISODE_TOTAL_COUNT: 4
MDC_IDC_STAT_EPISODE_TOTAL_COUNT: 59
MDC_IDC_STAT_EPISODE_TOTAL_COUNT_DTM_END: NORMAL
MDC_IDC_STAT_EPISODE_TOTAL_COUNT_DTM_START: NORMAL
MDC_IDC_STAT_EPISODE_TYPE: NORMAL
MDC_IDC_STAT_TACHYTHERAPY_ATP_DELIVERED_RECENT: 0
MDC_IDC_STAT_TACHYTHERAPY_ATP_DELIVERED_TOTAL: 0
MDC_IDC_STAT_TACHYTHERAPY_RECENT_DTM_END: NORMAL
MDC_IDC_STAT_TACHYTHERAPY_RECENT_DTM_START: NORMAL
MDC_IDC_STAT_TACHYTHERAPY_SHOCKS_ABORTED_RECENT: 0
MDC_IDC_STAT_TACHYTHERAPY_SHOCKS_ABORTED_TOTAL: 0
MDC_IDC_STAT_TACHYTHERAPY_SHOCKS_DELIVERED_RECENT: 0
MDC_IDC_STAT_TACHYTHERAPY_SHOCKS_DELIVERED_TOTAL: 0
MDC_IDC_STAT_TACHYTHERAPY_TOTAL_DTM_END: NORMAL
MDC_IDC_STAT_TACHYTHERAPY_TOTAL_DTM_START: NORMAL

## 2020-01-21 ENCOUNTER — THERAPY VISIT (OUTPATIENT)
Dept: PHYSICAL THERAPY | Facility: CLINIC | Age: 85
End: 2020-01-21
Payer: COMMERCIAL

## 2020-01-21 DIAGNOSIS — M62.89 PELVIC FLOOR DYSFUNCTION: ICD-10-CM

## 2020-01-21 PROCEDURE — 97110 THERAPEUTIC EXERCISES: CPT | Mod: GP | Performed by: PHYSICAL THERAPIST

## 2020-01-21 PROCEDURE — 97535 SELF CARE MNGMENT TRAINING: CPT | Mod: GP | Performed by: PHYSICAL THERAPIST

## 2020-01-23 DIAGNOSIS — I48.91 ATRIAL FIBRILLATION (H): ICD-10-CM

## 2020-01-23 LAB — INR PPP: 1.83 (ref 0.86–1.14)

## 2020-01-24 ENCOUNTER — ANTICOAGULATION THERAPY VISIT (OUTPATIENT)
Dept: ANTICOAGULATION | Facility: CLINIC | Age: 85
End: 2020-01-24

## 2020-01-24 DIAGNOSIS — I82.409 DEEP VEIN THROMBOSIS (DVT) (H): ICD-10-CM

## 2020-01-24 DIAGNOSIS — Z79.01 LONG TERM CURRENT USE OF ANTICOAGULANT THERAPY: ICD-10-CM

## 2020-01-24 DIAGNOSIS — I48.91 ATRIAL FIBRILLATION, UNSPECIFIED TYPE (H): ICD-10-CM

## 2020-01-28 ENCOUNTER — THERAPY VISIT (OUTPATIENT)
Dept: PHYSICAL THERAPY | Facility: CLINIC | Age: 85
End: 2020-01-28
Payer: COMMERCIAL

## 2020-01-28 DIAGNOSIS — M62.89 PELVIC FLOOR DYSFUNCTION: ICD-10-CM

## 2020-01-28 PROCEDURE — 97535 SELF CARE MNGMENT TRAINING: CPT | Mod: GP | Performed by: PHYSICAL THERAPIST

## 2020-01-28 PROCEDURE — 97112 NEUROMUSCULAR REEDUCATION: CPT | Mod: GP | Performed by: PHYSICAL THERAPIST

## 2020-02-04 ENCOUNTER — OFFICE VISIT (OUTPATIENT)
Dept: CARDIOLOGY | Facility: CLINIC | Age: 85
End: 2020-02-04
Attending: INTERNAL MEDICINE
Payer: COMMERCIAL

## 2020-02-04 VITALS
SYSTOLIC BLOOD PRESSURE: 127 MMHG | WEIGHT: 156 LBS | BODY MASS INDEX: 24.48 KG/M2 | DIASTOLIC BLOOD PRESSURE: 67 MMHG | HEART RATE: 71 BPM | OXYGEN SATURATION: 99 % | HEIGHT: 67 IN

## 2020-02-04 DIAGNOSIS — I87.2 CHRONIC VENOUS INSUFFICIENCY: ICD-10-CM

## 2020-02-04 DIAGNOSIS — I25.10 CORONARY ARTERY DISEASE INVOLVING NATIVE CORONARY ARTERY OF NATIVE HEART WITHOUT ANGINA PECTORIS: Primary | ICD-10-CM

## 2020-02-04 PROCEDURE — G0463 HOSPITAL OUTPT CLINIC VISIT: HCPCS

## 2020-02-04 PROCEDURE — 99204 OFFICE O/P NEW MOD 45 MIN: CPT | Mod: ZP | Performed by: INTERNAL MEDICINE

## 2020-02-04 ASSESSMENT — PAIN SCALES - GENERAL: PAINLEVEL: NO PAIN (0)

## 2020-02-04 ASSESSMENT — MIFFLIN-ST. JEOR: SCORE: 1356.24

## 2020-02-04 NOTE — PROGRESS NOTES
February 4, 2020    Reason for Referral:  Chronic venous insufficiency, carotid artery stenosis, peripheral vascular disease    HPI: Noe Florence is a 84 year old male with a past medical history significant for DVT in 4/2014 and then 7/2019, CAD s/p CABG x2, DESx2, polymorphic VT s/p ICD placement (5/2012, gen change 12/2018), and AF/AFL s/p CTI (6/2014) and right atrial appendage atrial tachycardia ablation (9/2014), HTN, HLD, and CKD3.     Cardiac risk factors include (+) hypertension, (+) hypercholesterolemia , (-) diabetes, (+) family Hx CAD, (-) tobacco, (-) cerebrovascular disease, (+) PAD.     Back on warfarin now. Goal is between 1.5-2.5.     No pain or new wounds in his lower extremities. No significant edema. Denies dyspnea with exertion. Denies chest pain. Still using his walker, but hopeful to transition to just a cane for now. Able to ambulate 178 steps, up to 20 minutes, without needing to stop to rest.       Previous hx:  He underwent colorectal surgery for colon cancer in February 2019.  In March 2019, patient fell and suffered a left hip fracture and underwent surgery.  In May 2019 patient fell again and suffered a distal right humerus fracture. With this fall history, his anticoagulation was stopped as Mr. Florence and his wife considered left atrial appendage occlusive device. He has a history of left femoral vein DVT several years ago but 3 weeks ago developed left LE edema again. He was evaluated in the ED on 7/26/19 and diagnosed with an occlusive popliteal vein DVT on ultrasound. He was started on lovenox and restarted on warfarin.    Patient is a long standing , who played vigorously up until 80 years old from a young age. Only when he stopped playing tennis did he discover progressive swelling. He was prescribed compression stockings (20-30 mmHg) however these were too large for him and would not stay up appropriately. His swelling was off and on for 2 years. Has an open  wound for which gets wound care and dressings. 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 He is chronically on warfarin and reports no bleeding/bruising.     Of note longstanding cardiology patient of Dr. Kenyon who is retiring. Therefore here to establish cardiac care as well. He denies any chest pain, shortness of breath. He has been well controlled in his AF and HTN with current medication regimen.    PAST MEDICAL HISTORY:  Past Medical History:   Diagnosis Date     Advanced open-angle glaucoma      Atrial fibrillation (H)      CKD (chronic kidney disease), stage III (H) 2005     Colon cancer (H)     Stage II-B colon cancer     Coronary artery disease     s/p CABG x 2, JEREMY x 2     Diverticulitis      Hyperlipidemia      Hypertension      Ischemic cardiomyopathy      MGUS (monoclonal gammopathy of unknown significance)      Nonsenile cataract     BE     Osteoporosis     left hip fracture     Polymorphic ventricular tachycardia (H)      Polymyalgia rheumatica (H)      PVD (posterior vitreous detachment), both eyes 2005     S/P ablation of atrial flutter 6/20/14    CTI     Stented coronary artery      SVT (supraventricular tachycardia) (H)     PPM/AICD for NSVT     Upper leg DVT (deep venous thromboembolism), chronic (H)     Left     Weight loss, unintentional 2017    15 lb in 4 months       PAST SURGICAL HISTORY:  Past Surgical History:   Procedure Laterality Date     C CABG, ARTERY-VEIN, FOUR  2006    LIMA-LAD, SVG-Rt PDA, SVG-OM2, SVG-Diag 1     C CABG, VEIN, SINGLE  1994    1-vessel CABG, SVG->PDRCA      CATARACT IOL, RT/LT Bilateral      COLONOSCOPY  3/13/2014    Procedure: COMBINED COLONOSCOPY, SINGLE BIOPSY/POLYPECTOMY BY BIOPSY;;  Surgeon: Mary Gerber MD;  Location:  GI     COLONOSCOPY N/A 6/22/2015    Procedure: COLONOSCOPY;  Surgeon: Marilin Newman MD;  Location:  GI     COLONOSCOPY N/A 11/7/2018    Procedure: COMBINED  COLONOSCOPY, SINGLE OR MULTIPLE BIOPSY/POLYPECTOMY BY BIOPSY;  Surgeon: Roberto Esteban MD;  Location: [facility] GI     EP ICD GENERATOR CHANGE DUAL N/A 12/11/2018    Procedure: EP ICD Generator Change Dual;  Surgeon: Deedee Baird MD;  Location: [facility] CARD[facility] CATH [facility]     H ABLATION ATRIAL FLUTTER       HEART CATH DRUG ELUTING STENT PLACEMENT  4/19/2012    JEREMY x 2 to LCx     IMPLANT IMPLANTABLE CARDIOVERTER DEFIBRILLATOR  5-    ppm/aicd     KNEE SURGERY      right and left knee surgeries     LAPAROSCOPIC ASSISTED COLECTOMY Right 2/1/2019    Procedure: Laparoscopic Converted to Open Transverse Colectomy with Lysis of Adhesions;  Surgeon: Chanelle Guzmán MD;  Location: [facility] OR     LAPAROSCOPIC ASSISTED COLECTOMY LEFT (DESCENDING)  4/8/2014    Procedure: LAPAROSCOPIC ASSISTED COLECTOMY LEFT (DESCENDING);  Hand assisted Laparoscopic Sigmoid Colectomy , Laparoscopic mobilization of spleenic flexure *Latex Allergy*Anesthesia General with Block;  Surgeon: Chanelle Guzmán MD;  Location: [facility] OR     OPEN REDUCTION INTERNAL FIXATION RODDING INTRAMEDULLAR FEMUR FRACTURE TABLE Left 3/14/2019    Procedure: Open Reduction Internal Fixation Left Femur Intramedullar Nailing;  Surgeon: Srikanth Avalos MD;  Location: [facility] OR     REPAIR VALVE MITRAL  2006     30-mm Medtronic Julian ring      SELECTIVE LASER TRABECULOPLASTY (SLT) OD (RIGHT EYE)  4/10, 1/12,+1/9/13    RE     SELECTIVE LASER TRABECULOPLASTY (SLT) OS (LEFT EYE)  5/2012     SHOULDER SURGERY      right rotator cuff       FAMILY HISTORY:  Family History   Problem Relation Age of Onset     C.A.D. Father      Anesthesia Reaction No family hx of      Crohn's Disease No family hx of      Ulcerative Colitis No family hx of      Cancer - colorectal No family hx of      Macular Degeneration No family hx of      Cancer No family hx of         No known family hx of skin cancer     Melanoma No family hx of      Skin Cancer No family hx of   "      SOCIAL HISTORY:  Social History     Tobacco Use     Smoking status: Former Smoker     Types: Cigarettes, Cigars     Last attempt to quit: 1968     Years since quittin.1     Smokeless tobacco: Never Used   Substance Use Topics     Alcohol use: Yes     Alcohol/week: 0.0 standard drinks     Comment: 2-3 drinks twice weekly     Drug use: No       ALLERGIES:     Allergies   Allergen Reactions     Latex Rash     Rash       CURRENT MEDICATIONS:  Current Outpatient Medications   Medication Sig Dispense Refill     acetaminophen (TYLENOL) 500 MG tablet Take 2 tablets (1,000 mg) by mouth 3 times daily as needed for mild pain (Patient not taking: Reported on 2019)       alendronate (FOSAMAX) 70 MG tablet Take 1 tablet (70 mg) by mouth every 7 days Take with a full glass of water and do not eat or lay down for 30 minutes 12 tablet 3     ARIPiprazole (ABILIFY) 2 MG tablet Take 2 mg by mouth daily       atorvastatin (LIPITOR) 40 MG tablet Take 1 tablet (40 mg) by mouth daily 90 tablet 3     calcium carbonate 500 mg, elemental, (OSCAL;OYSTER SHELL CALCIUM) 500 MG tablet Take 1 tablet (500 mg) by mouth 2 times daily 180 tablet 3     cholecalciferol 1000 units TABS Take 1,000 Units by mouth daily        Cyanocobalamin (VITAMIN B-12 CR PO)        ferrous sulfate (FE TABS) 325 (65 Fe) MG EC tablet Take 325 mg by mouth daily       metoprolol tartrate (LOPRESSOR) 25 MG tablet Take 12.5 mg by mouth 2 times daily  180 tablet 3     mirtazapine (REMERON) 15 MG tablet Take 15 mg by mouth At Bedtime.       Multiple Vitamins-Minerals (MULTIVITAMIN ADULT PO)        polyethylene glycol (MIRALAX) packet Take 238 g by mouth See Admin Instructions Start at 4 pm night prior to colonoscopy. Refer to \"Getting Ready for Colonoscopy\" instructions. 238 g 0     sertraline (ZOLOFT) 100 MG tablet Take 150 mg by mouth every evening        tamsulosin (FLOMAX) 0.4 MG capsule Take 2 capsules (0.8 mg) by mouth daily 180 capsule 3     warfarin " (COUMADIN) 5 MG tablet Take 1 tablet (5 mg) by mouth daily Or as directed by [facility] 60 tablet 0     warfarin ANTICOAGULANT (COUMADIN) 5 MG tablet Take 1 tablet (5 mg) by mouth daily 90 tablet 1       ROS:   12-point ROS otherwise negative except as noted in HPI    Exam:  There were no vitals taken for this visit.  GENERAL APPEARANCE: healthy, alert and no distress  HEENT: EOMI  NECK: no adenopathy, no asymmetry, masses, or scars, thyroid normal to palpation and no bruits, JVP not elevated  RESPIRATORY: lungs clear to auscultation - no rales, rhonchi or wheezes, no use of accessory muscles, no retractions, respirations are unlabored, normal respiratory rate  CARDIOVASCULAR: regular rhythm, normal S1 with physiologic split S2, precordium quiet with normal PMI.  ABDOMEN: soft, non tender, without hepatosplenomegaly, no masses palpable, bowel sounds normal, aorta not enlarged by palpation, no abdominal bruits  EXTREMITIES: warm to touch, 1+ pitting edema on L lower leg, bilateral popliteal pulses palpable, bilateral PT/DP pulses dopperable  NEURO: alert and oriented to person/place/time, normal speech, gait and affect  VASC: Radial, femoral, dorsalis pedis and posterior tibialis pulses are normal in volumes and symmetric bilaterally. No bruits are heard.  SKIN: no ecchymoses, no rashes    Labs:  CBC RESULTS:   Lab Results   Component Value Date    WBC 7.8 11/18/2019    RBC 3.81 (L) 11/18/2019    HGB 11.5 (L) 11/18/2019    HCT 36.2 (L) 11/18/2019    MCV 95 11/18/2019    MCH 30.2 11/18/2019    MCHC 31.8 11/18/2019    RDW 14.6 11/18/2019     11/18/2019       BMP RESULTS:  Lab Results   Component Value Date     11/18/2019    POTASSIUM 4.3 11/18/2019    CHLORIDE 110 (H) 11/18/2019    CO2 26 11/18/2019    ANIONGAP 5 11/18/2019    GLC 51 (L) 11/18/2019    BUN 56 (H) 11/18/2019    CR 1.47 (H) 11/18/2019    GFRESTIMATED 43 (L) 11/18/2019    GFRESTBLACK 50 (L) 11/18/2019    KEITH 9.2 11/18/2019      [facility]  Results   Component Value Date    TROPI 0.096 (H) 04/20/2012    TROPI <0.012 04/19/2012    TROPI 0.016 01/16/2009    TROPI 0.012 01/16/2009    TROPI <0.012 01/12/2008    TROPONIN 0.00 04/19/2012    TROPONIN 0.00 01/15/2009    TROPONIN 0.00 01/12/2008     LIPIDS  Recent Labs   Lab Test 08/12/19  1045 01/15/18  1224  09/16/15  1238 05/22/14  1455   CHOL 146 119   < > 136 138   HDL 61 58   < > 43 45   LDL 55 46   < > 72 68   TRIG 150* 76   < > 105 123   CHOLHDLRATIO  --   --   --  3.2 3.0    < > = values in this interval not displayed.     TSH  TSH   Date Value Ref Range Status   03/27/2018 0.67 0.40 - 4.00 mU/L Final     HBA1C  Lab Results   Component Value Date    A1C 5.7 02/27/2012    A1C 6.0 01/27/2011    A1C 6.2 01/16/2009    A1C 6.1 05/03/2006     INR RESULTS:  Lab Results   Component Value Date    INR 1.83 (H) 01/23/2020    INR 1.95 (H) 01/02/2020    INR 1.97 (H) 12/10/2019    INR 1.46 (H) 11/26/2019     Assessment and Plan:   84 year old male h/o DVT 4/2014 and 7/2019, PAD, CAD s/p CABG x2, JEREMY x2, VT s/p ICD, AF/AFL s/p ablation and LLA closure, HTN, HLD and CKD3 who presents for follow up.     1. Chronic venous insufficiency - no major interventions today, re-prescribed compression stockings, 30-40mmHg for both legs  2. DVT on warfarin, INR goal 1.5-2.5 - continue for now, no major bleeding issues  3. CAD s/p CABG and PCI - recommended that pt restart aspirin 81mg qday  4. Hypertension - controlled, off medications for now  5. Hyperlipidemia - controlled with atorvastatin 40, LDL was 55 on 8/2019    Patient was seen and discussed with attending physician, Dr. Octaviano Santos MD, MPH.     Chino Davies MD, PhD  Cardiology Fellow      ATTENDING NOTE:  Patient has been seen and evaluated by me on 02/04/2020. I have reviewed the documentation above.  I have reviewed today's vital signs, medications, labs, and imaging results.  I have reviewed and edited, as necessary, the history, review of systems, physical  examination, and assessment and plan.  I have discussed my assessment and plan with the cardiology fellow.  Noe Florence is a 84 year old male with risk factor profile (+) HTN, (-) DM, (+) hypercholesterolemia, (-) tobacco use, (+) fam Hx premature CAD, Hx CAD s/p 2 remote CABG procedures, Hx DVT (2014, 2019) and PAF on coumadin, returns to clinic for ongoing evaluation of chronic venous insufficiency and PAD (mid LF SFA stenosis).  No claudication.  Patient had venous US (July 2019) with Lf popliteal thrombosis (while temporarily off coumadin following fall associated with trauma).  He had prior venous US (Nov 2017) showing:    VENOUS US, BLE:  (Nov 2017)  1. Right leg: Incompetence of right common femoral vein, residual right great saphenous vein in the proximal and mid thigh and incompetent right saphenofemoral junction. The great saphenous vein dimensions in this location may be from 2 to 6 mm. Incompetent   vein measuring 4 mm in the leg four centimeter from the medial malleolus, which connects to a competent tributary of the great  saphenous vein. No varicose veins.     2. Left leg: Incompetence of the common femoral vein, femoral vein in the distal thigh and popliteal vein. Incompetent saphenofemoral  junction, greater saphenous vein in the proximal calf. The diameter of the greater saphenous vein about the SFJ varies from 4.9 to 7.2 mm and 3 to 4.4 mm in the region of the knee/proximal calf. 2 incompetent perforators in the lower leg, measuring up to 2.7 and 2.3 cm. No  varicose veins.      ABIs (Dec 2019):   Right:   DELFINO: 1.35   TBI: 1.03  Left:  DELFINO: Noncompressible   TBI: 1.17     ARTERIAL US, BLE  (Dec 2019):  Impression:   1. Right leg: Widely patent arteries with no evidence of occlusion or hemodynamically significant stenosis by velocity or waveform criteria.  2. Left leg: .Elevated velocity in the proximal SFA of 153 cm/s with velocity ratio of slightly greater than 2, suggestive of  20-49%  stenosis.  Monophasic waveform in the distal PTA, without evidence of proximal occlusion or stenosis in this vessel, is of uncertain  significance but can be seen when there is inflammation in the foot.     The patient has been treated with compression stockings with marked improvement.  Plan on reordering stockings (JOB 30-40 mm Hg).  Lipids controlled.   I would like to try to get him back on ASA 81 mg every day provided he does not have significant bleeding issues.  Patient has nonpalpable PT and DP bilaterally but all 4 pulses have Dopplerable signals.  There is no tissue ulceration.  No evidence of CLI.  Patient has not had angina and I think it would be best if we did not open [place]'s box by performing a stress test.    Octaviano Santos MD     Cardiovascular Division

## 2020-02-04 NOTE — LETTER
2/4/2020      RE: Noe Florence  [address] [facility] [address]       Dear Colleague,    Thank you for the opportunity to participate in the care of your patient, Noe Florence, at the [facility]. Please see a copy of my visit note below.    February 4, 2020    Reason for Referral:  Chronic venous insufficiency, carotid artery stenosis, peripheral vascular disease    HPI: Noe Florence is a 84 year old male with a past medical history significant for DVT in 4/2014 and then 7/2019, CAD s/p CABG x2, DESx2, polymorphic VT s/p ICD placement (5/2012, gen change 12/2018), and AF/AFL s/p CTI (6/2014) and right atrial appendage atrial tachycardia ablation (9/2014), HTN, HLD, and CKD3.     Cardiac risk factors include (+) hypertension, (+) hypercholesterolemia , (-) diabetes, (+) family Hx CAD, (-) tobacco, (-) cerebrovascular disease, (+) PAD.     Back on warfarin now. Goal is between 1.5-2.5.     No pain or new wounds in his lower extremities. No significant edema. Denies dyspnea with exertion. Denies chest pain. Still using his walker, but hopeful to transition to just a cane for now. Able to ambulate 178 steps, up to 20 minutes, without needing to stop to rest.       Previous hx:  He underwent colorectal surgery for colon cancer in February 2019.  In March 2019, patient fell and suffered a left hip fracture and underwent surgery.  In May 2019 patient fell again and suffered a distal right humerus fracture. With this fall history, his anticoagulation was stopped as Mr. Florence and his wife considered left atrial appendage occlusive device. He has a history of left femoral vein DVT several years ago but 3 weeks ago developed left LE edema again. He was evaluated in the ED on 7/26/19 and diagnosed with an occlusive popliteal vein DVT on ultrasound. He was started on lovenox and restarted on warfarin.    Patient is a long standing , who  played vigorously up until 80 years old from a young age. Only when he stopped playing tennis did he discover progressive swelling. He was prescribed compression stockings (20-30 mmHg) however these were too large for him and would not stay up appropriately. His swelling was off and on for 2 years. Has an open wound for which gets wound care and dressings. 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 He is chronically on warfarin and reports no bleeding/bruising.     Of note longstanding cardiology patient of Dr. Kenyon who is retiring. Therefore here to establish cardiac care as well. He denies any chest pain, shortness of breath. He has been well controlled in his AF and HTN with current medication regimen.    PAST MEDICAL HISTORY:  Past Medical History:   Diagnosis Date     Advanced open-angle glaucoma      Atrial fibrillation (H)      CKD (chronic kidney disease), stage III (H) 2005     Colon cancer (H)     Stage II-B colon cancer     Coronary artery disease     s/p CABG x 2, JEREMY x 2     Diverticulitis      Hyperlipidemia      Hypertension      Ischemic cardiomyopathy      MGUS (monoclonal gammopathy of unknown significance)      Nonsenile cataract     BE     Osteoporosis     left hip fracture     Polymorphic ventricular tachycardia (H)      Polymyalgia rheumatica (H)      PVD (posterior vitreous detachment), both eyes 2005     S/P ablation of atrial flutter 6/20/14    CTI     Stented coronary artery      SVT (supraventricular tachycardia) (H)     PPM/AICD for NSVT     Upper leg DVT (deep venous thromboembolism), chronic (H)     Left     Weight loss, unintentional 2017    15 lb in 4 months     PAST SURGICAL HISTORY:  Past Surgical History:   Procedure Laterality Date     C CABG, ARTERY-VEIN, FOUR  2006    LIMA-LAD, SVG-Rt PDA, SVG-OM2, SVG-Diag 1     C CABG, VEIN, SINGLE  1994    1-vessel CABG, SVG->PDRCA      CATARACT IOL, RT/LT Bilateral       COLONOSCOPY  3/13/2014    Procedure: COMBINED COLONOSCOPY, SINGLE BIOPSY/POLYPECTOMY BY BIOPSY;;  Surgeon: Mary Gerber MD;  Location: [facility]     COLONOSCOPY N/A 6/22/2015    Procedure: COLONOSCOPY;  Surgeon: Marilin Newman MD;  Location: [facility]     COLONOSCOPY N/A 11/7/2018    Procedure: COMBINED COLONOSCOPY, SINGLE OR MULTIPLE BIOPSY/POLYPECTOMY BY BIOPSY;  Surgeon: Roberto Esteban MD;  Location: [facility]     EP ICD GENERATOR CHANGE DUAL N/A 12/11/2018    Procedure: EP ICD Generator Change Dual;  Surgeon: Deedee Baird MD;  Location:  HEART CARDIAC CATH LAB     H ABLATION ATRIAL FLUTTER       HEART CATH DRUG ELUTING STENT PLACEMENT  4/19/2012    JEREMY x 2 to LCx     IMPLANT IMPLANTABLE CARDIOVERTER DEFIBRILLATOR  5-    ppm/aicd     KNEE SURGERY      right and left knee surgeries     LAPAROSCOPIC ASSISTED COLECTOMY Right 2/1/2019    Procedure: Laparoscopic Converted to Open Transverse Colectomy with Lysis of Adhesions;  Surgeon: Chanelle Guzmán MD;  Location:  OR     LAPAROSCOPIC ASSISTED COLECTOMY LEFT (DESCENDING)  4/8/2014    Procedure: LAPAROSCOPIC ASSISTED COLECTOMY LEFT (DESCENDING);  Hand assisted Laparoscopic Sigmoid Colectomy , Laparoscopic mobilization of spleenic flexure *Latex Allergy*Anesthesia General with Block;  Surgeon: Chanelle Guzmán MD;  Location: [facility]     OPEN REDUCTION INTERNAL FIXATION RODDING INTRAMEDULLAR FEMUR FRACTURE TABLE Left 3/14/2019    Procedure: Open Reduction Internal Fixation Left Femur Intramedullar Nailing;  Surgeon: Srikanth Avalos MD;  Location:  OR     REPAIR VALVE MITRAL  2006     30-mm Medtronic Julian ring      SELECTIVE LASER TRABECULOPLASTY (SLT) OD (RIGHT EYE)  4/10, 1/12,+1/9/13    RE     SELECTIVE LASER TRABECULOPLASTY (SLT) OS (LEFT EYE)  5/2012     SHOULDER SURGERY      right rotator cuff     FAMILY HISTORY:  Family History   Problem Relation Age of Onset     C.A.D. Father      Anesthesia Reaction No  family hx of      Crohn's Disease No family hx of      Ulcerative Colitis No family hx of      Cancer - colorectal No family hx of      Macular Degeneration No family hx of      Cancer No family hx of         No known family hx of skin cancer     Melanoma No family hx of      Skin Cancer No family hx of      SOCIAL HISTORY:  Social History     Tobacco Use     Smoking status: Former Smoker     Types: Cigarettes, Cigars     Last attempt to quit: 1968     Years since quittin.1     Smokeless tobacco: Never Used   Substance Use Topics     Alcohol use: Yes     Alcohol/week: 0.0 standard drinks     Comment: 2-3 drinks twice weekly     Drug use: No     ALLERGIES:  Allergies   Allergen Reactions     Latex Rash     Rash     CURRENT MEDICATIONS:  Current Outpatient Medications   Medication Sig Dispense Refill     acetaminophen (TYLENOL) 500 MG tablet Take 2 tablets (1,000 mg) by mouth 3 times daily as needed for mild pain (Patient not taking: Reported on 2019)       alendronate (FOSAMAX) 70 MG tablet Take 1 tablet (70 mg) by mouth every 7 days Take with a full glass of water and do not eat or lay down for 30 minutes 12 tablet 3     ARIPiprazole (ABILIFY) 2 MG tablet Take 2 mg by mouth daily       atorvastatin (LIPITOR) 40 MG tablet Take 1 tablet (40 mg) by mouth daily 90 tablet 3     calcium carbonate 500 mg, elemental, (OSCAL;OYSTER SHELL CALCIUM) 500 MG tablet Take 1 tablet (500 mg) by mouth 2 times daily 180 tablet 3     cholecalciferol 1000 units TABS Take 1,000 Units by mouth daily        Cyanocobalamin (VITAMIN B-12 CR PO)        ferrous sulfate (FE TABS) 325 (65 Fe) MG EC tablet Take 325 mg by mouth daily       metoprolol tartrate (LOPRESSOR) 25 MG tablet Take 12.5 mg by mouth 2 times daily  180 tablet 3     mirtazapine (REMERON) 15 MG tablet Take 15 mg by mouth At Bedtime.       Multiple Vitamins-Minerals (MULTIVITAMIN ADULT PO)        polyethylene glycol (MIRALAX) packet Take 238 g by mouth See Admin  "Instructions Start at 4 pm night prior to colonoscopy. Refer to \"Getting Ready for Colonoscopy\" instructions. 238 g 0     sertraline (ZOLOFT) 100 MG tablet Take 150 mg by mouth every evening        tamsulosin (FLOMAX) 0.4 MG capsule Take 2 capsules (0.8 mg) by mouth daily 180 capsule 3     warfarin (COUMADIN) 5 MG tablet Take 1 tablet (5 mg) by mouth daily Or as directed by [facility] 60 tablet 0     warfarin ANTICOAGULANT (COUMADIN) 5 MG tablet Take 1 tablet (5 mg) by mouth daily 90 tablet 1     ROS:   12-point ROS otherwise negative except as noted in HPI    Exam:  There were no vitals taken for this visit.  GENERAL APPEARANCE: healthy, alert and no distress  HEENT: EOMI  NECK: no adenopathy, no asymmetry, masses, or scars, thyroid normal to palpation and no bruits, JVP not elevated  RESPIRATORY: lungs clear to auscultation - no rales, rhonchi or wheezes, no use of accessory muscles, no retractions, respirations are unlabored, normal respiratory rate  CARDIOVASCULAR: regular rhythm, normal S1 with physiologic split S2, precordium quiet with normal PMI.  ABDOMEN: soft, non tender, without hepatosplenomegaly, no masses palpable, bowel sounds normal, aorta not enlarged by palpation, no abdominal bruits  EXTREMITIES: warm to touch, 1+ pitting edema on L lower leg, bilateral popliteal pulses palpable, bilateral PT/DP pulses dopperable  NEURO: alert and oriented to person/place/time, normal speech, gait and affect  VASC: Radial, femoral, dorsalis pedis and posterior tibialis pulses are normal in volumes and symmetric bilaterally. No bruits are heard.  SKIN: no ecchymoses, no rashes    Labs:  CBC RESULTS:   Lab Results   Component Value Date    WBC 7.8 11/18/2019    RBC 3.81 (L) 11/18/2019    HGB 11.5 (L) 11/18/2019    HCT 36.2 (L) 11/18/2019    MCV 95 11/18/2019    MCH 30.2 11/18/2019    MCHC 31.8 11/18/2019    RDW 14.6 11/18/2019     11/18/2019     BMP RESULTS:  Lab Results   Component Value Date     " 11/18/2019    POTASSIUM 4.3 11/18/2019    CHLORIDE 110 (H) 11/18/2019    CO2 26 11/18/2019    ANIONGAP 5 11/18/2019    GLC 51 (L) 11/18/2019    BUN 56 (H) 11/18/2019    CR 1.47 (H) 11/18/2019    GFRESTIMATED 43 (L) 11/18/2019    GFRESTBLACK 50 (L) 11/18/2019    KEITH 9.2 11/18/2019     TROP  Lab Results   Component Value Date    TROPI 0.096 (H) 04/20/2012    TROPI <0.012 04/19/2012    TROPI 0.016 01/16/2009    TROPI 0.012 01/16/2009    TROPI <0.012 01/12/2008    TROPONIN 0.00 04/19/2012    TROPONIN 0.00 01/15/2009    TROPONIN 0.00 01/12/2008     LIPIDS  Recent Labs   Lab Test 08/12/19  1045 01/15/18  1224  09/16/15  1238 05/22/14  1455   CHOL 146 119   < > 136 138   HDL 61 58   < > 43 45   LDL 55 46   < > 72 68   TRIG 150* 76   < > 105 123   CHOLHDLRATIO  --   --   --  3.2 3.0    < > = values in this interval not displayed.     TSH  TSH   Date Value Ref Range Status   03/27/2018 0.67 0.40 - 4.00 mU/L Final     HBA1C  Lab Results   Component Value Date    A1C 5.7 02/27/2012    A1C 6.0 01/27/2011    A1C 6.2 01/16/2009    A1C 6.1 05/03/2006     INR RESULTS:  Lab Results   Component Value Date    INR 1.83 (H) 01/23/2020    INR 1.95 (H) 01/02/2020    INR 1.97 (H) 12/10/2019    INR 1.46 (H) 11/26/2019     Assessment and Plan:   84 year old male h/o DVT 4/2014 and 7/2019, PAD, CAD s/p CABG x2, JEREMY x2, VT s/p ICD, AF/AFL s/p ablation and LLA closure, HTN, HLD and CKD3 who presents for follow up.     1. Chronic venous insufficiency - no major interventions today, re-prescribed compression stockings, 30-40mmHg for both legs  2. DVT on warfarin, INR goal 1.5-2.5 - continue for now, no major bleeding issues  3. CAD s/p CABG and PCI - recommended that pt restart aspirin 81mg qday  4. Hypertension - controlled, off medications for now  5. Hyperlipidemia - controlled with atorvastatin 40, LDL was 55 on 8/2019    Patient was seen and discussed with attending physician, Dr. Octaviano Santos MD, MPH.     Chino Davies MD, PhD  Cardiology  Fellow    ATTENDING NOTE:  Patient has been seen and evaluated by me on 02/04/2020. I have reviewed the documentation above.  I have reviewed today's vital signs, medications, labs, and imaging results.  I have reviewed and edited, as necessary, the history, review of systems, physical examination, and assessment and plan.  I have discussed my assessment and plan with the cardiology fellow.  Noe Florence is a 84 year old male with risk factor profile (+) HTN, (-) DM, (+) hypercholesterolemia, (-) tobacco use, (+) fam Hx premature CAD, Hx CAD s/p 2 remote CABG procedures, Hx DVT (2014, 2019) and PAF on coumadin, returns to clinic for ongoing evaluation of chronic venous insufficiency and PAD (mid LF SFA stenosis).  No claudication.  Patient had venous US (July 2019) with Lf popliteal thrombosis (while temporarily off coumadin following fall associated with trauma).  He had prior venous US (Nov 2017) showing:    VENOUS US, BLE:  (Nov 2017)  1. Right leg: Incompetence of right common femoral vein, residual right great saphenous vein in the proximal and mid thigh and incompetent right saphenofemoral junction. The great saphenous vein dimensions in this location may be from 2 to 6 mm. Incompetent   vein measuring 4 mm in the leg four centimeter from the medial malleolus, which connects to a competent tributary of the great  saphenous vein. No varicose veins.     2. Left leg: Incompetence of the common femoral vein, femoral vein in the distal thigh and popliteal vein. Incompetent saphenofemoral  junction, greater saphenous vein in the proximal calf. The diameter of the greater saphenous vein about the SFJ varies from 4.9 to 7.2 mm and 3 to 4.4 mm in the region of the knee/proximal calf. 2 incompetent perforators in the lower leg, measuring up to 2.7 and 2.3 cm. No  varicose veins.      ABIs (Dec 2019):   Right:   DELFINO: 1.35   TBI: 1.03  Left:  DELFINO: Noncompressible   TBI: 1.17     ARTERIAL US, BLE  (Dec  2019):  Impression:   1. Right leg: Widely patent arteries with no evidence of occlusion or hemodynamically significant stenosis by velocity or waveform criteria.  2. Left leg: .Elevated velocity in the proximal SFA of 153 cm/s with velocity ratio of slightly greater than 2, suggestive of 20-49%  stenosis.  Monophasic waveform in the distal PTA, without evidence of proximal occlusion or stenosis in this vessel, is of uncertain  significance but can be seen when there is inflammation in the foot.     The patient has been treated with compression stockings with marked improvement.  Plan on reordering stockings (JOB 30-40 mm Hg).  Lipids controlled.   I would like to try to get him back on ASA 81 mg every day provided he does not have significant bleeding issues.  Patient has nonpalpable PT and DP bilaterally but all 4 pulses have Dopplerable signals.  There is no tissue ulceration.  No evidence of CLI.  Patient has not had angina and I think it would be best if we did not open [place]'s box by performing a stress test.    Octaviano Santos MD     Cardiovascular Division

## 2020-02-04 NOTE — NURSING NOTE
Med Reconcile: Reviewed and verified all current medications with the patient. The updated medication list was printed and given to the patient.  New Medication: Patient was educated regarding newly prescribed medication, including discussion of  the indication, administration, side effects, and when to report to MD or RN. Patient demonstrated understanding of this information and agreed to call with further questions or concerns.  Return Appointment: Patient given instructions regarding scheduling next clinic visit. Patient demonstrated understanding of this information and agreed to call with further questions or concerns.  Patient stated he understood all health information given and agreed to call with further questions or concerns.

## 2020-02-04 NOTE — NURSING NOTE
Chief Complaint   Patient presents with     Follow Up      former [facility] patient with PAD     Vitals were taken and medications were reconciled.   Maryam Shafer  3:47 PM

## 2020-02-04 NOTE — PATIENT INSTRUCTIONS
Patient Instructions:  It was a pleasure to see you in the cardiology clinic today.      If you have any questions, call  Anais Raymond RN, at [phone].  Press Option #1 for the [facility], and then press Option #4  We are encouraging the use of Vigilost to communicate with your HealthCare Provider    Note the new medications: start Aspirin 81 mg by mouth daily  Stop the following medications: none    The results from today include: none  Please follow up with Dr. Santos in one year      If you have an urgent need after hours (8:00 am to 4:30 pm) please call [phone] and ask for the cardiology fellow on call.

## 2020-02-11 ENCOUNTER — THERAPY VISIT (OUTPATIENT)
Dept: PHYSICAL THERAPY | Facility: CLINIC | Age: 85
End: 2020-02-11
Payer: COMMERCIAL

## 2020-02-11 ENCOUNTER — OFFICE VISIT (OUTPATIENT)
Dept: NEUROLOGY | Facility: CLINIC | Age: 85
End: 2020-02-11
Payer: COMMERCIAL

## 2020-02-11 DIAGNOSIS — M62.89 PELVIC FLOOR DYSFUNCTION: ICD-10-CM

## 2020-02-11 DIAGNOSIS — R26.9 GAIT DISORDER: Primary | ICD-10-CM

## 2020-02-11 DIAGNOSIS — D47.2 MGUS (MONOCLONAL GAMMOPATHY OF UNKNOWN SIGNIFICANCE): ICD-10-CM

## 2020-02-11 PROCEDURE — 97535 SELF CARE MNGMENT TRAINING: CPT | Mod: GP | Performed by: PHYSICAL THERAPIST

## 2020-02-11 PROCEDURE — 97112 NEUROMUSCULAR REEDUCATION: CPT | Mod: GP | Performed by: PHYSICAL THERAPIST

## 2020-02-11 PROCEDURE — 97110 THERAPEUTIC EXERCISES: CPT | Mod: GP | Performed by: PHYSICAL THERAPIST

## 2020-02-11 ASSESSMENT — PAIN SCALES - GENERAL: PAINLEVEL: NO PAIN (0)

## 2020-02-11 NOTE — PROGRESS NOTES
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     Interval history:  I last saw him 3/5/2019. He has unfortunately had a few falls over the last year. One resulted in a hip fracture and the other an elbow fracture. He was in rehab after both of these falls. He now walks with a walker and sometimes a cane. His prior neurologic deficits are similar to last year. He has a foot drop which is stable. He denies numbness or pain in his feet or hands. No low back pain.  He completed PT but is doing exercises at home.     Prior pertinent laboratory work-up:  3/19: Serum IF showed monoclonal IgM kappa. IgM 111.   3/18: MAG negative. Urine IF showed no monoclonal protein.   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 IgM 113.     Prior pertinent radiology work-up:  2015: CT LS spine showed degenerative changes resulting in mild to moderate spinal canal narrowing from L2-L5.    Prior electrophysiologic work-up:  6/14: Nerve conduction studies/EMG performed at [facility] showed a left common peroneal neuropathy vs L5 radiculopathy. 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   4/18: NCS/EMG showed multiple findings. See report for details.      Past Medical History:   Past Medical History:   Diagnosis Date     Advanced open-angle glaucoma      Atrial fibrillation (H)      CKD (chronic kidney disease), stage III (H) 2005     Colon cancer (H)     Stage II-B colon cancer     Coronary artery disease     s/p CABG x 2, JEREMY x 2     Diverticulitis      Hyperlipidemia      Hypertension      Ischemic cardiomyopathy      MGUS (monoclonal gammopathy of unknown significance)      Nonsenile cataract     BE     Osteoporosis     left hip fracture     Polymorphic ventricular tachycardia (H)       Polymyalgia rheumatica (H)      PVD (posterior vitreous detachment), both eyes 2005     S/P ablation of atrial flutter 6/20/14    CTI     Stented coronary artery      SVT (supraventricular tachycardia) (H)     PPM/AICD for NSVT     Upper leg DVT (deep venous thromboembolism), chronic (H)     Left     Weight loss, unintentional 2017    15 lb in 4 months     Past Surgical History:  Past Surgical History:   Procedure Laterality Date     C CABG, ARTERY-VEIN, FOUR  2006    LIMA-LAD, SVG-Rt PDA, SVG-OM2, SVG-Diag 1     C CABG, VEIN, SINGLE  1994    1-vessel CABG, SVG->PDRCA      CATARACT IOL, RT/LT Bilateral      COLONOSCOPY  3/13/2014    Procedure: COMBINED COLONOSCOPY, SINGLE BIOPSY/POLYPECTOMY BY BIOPSY;;  Surgeon: Mary Gerber MD;  Location:  GI     COLONOSCOPY N/A 6/22/2015    Procedure: COLONOSCOPY;  Surgeon: Marilin Newman MD;  Location:  GI     COLONOSCOPY N/A 11/7/2018    Procedure: COMBINED COLONOSCOPY, SINGLE OR MULTIPLE BIOPSY/POLYPECTOMY BY BIOPSY;  Surgeon: Roberto Esteban MD;  Location:  GI     EP ICD GENERATOR CHANGE DUAL N/A 12/11/2018    Procedure: EP ICD Generator Change Dual;  Surgeon: Deedee Baird MD;  Location:  HEART CARDIAC CATH LAB     H ABLATION ATRIAL FLUTTER       HEART CATH DRUG ELUTING STENT PLACEMENT  4/19/2012    JEREMY x 2 to LCx     IMPLANT IMPLANTABLE CARDIOVERTER DEFIBRILLATOR  5-    ppm/aicd     KNEE SURGERY      right and left knee surgeries     LAPAROSCOPIC ASSISTED COLECTOMY Right 2/1/2019    Procedure: Laparoscopic Converted to Open Transverse Colectomy with Lysis of Adhesions;  Surgeon: Chanelle Guzmán MD;  Location:  OR     LAPAROSCOPIC ASSISTED COLECTOMY LEFT (DESCENDING)  4/8/2014    Procedure: LAPAROSCOPIC ASSISTED COLECTOMY LEFT (DESCENDING);  Hand assisted Laparoscopic Sigmoid Colectomy , Laparoscopic mobilization of spleenic flexure *Latex Allergy*Anesthesia General with Block;  Surgeon: Chanelle Guzmán MD;   Location: [facility] OR     OPEN REDUCTION INTERNAL FIXATION RODDING INTRAMEDULLAR FEMUR FRACTURE TABLE Left 3/14/2019    Procedure: Open Reduction Internal Fixation Left Femur Intramedullar Nailing;  Surgeon: Srikanth Avalos MD;  Location: [facility] OR     REPAIR VALVE MITRAL  2006     30-mm Medtronic Julian ring      SELECTIVE LASER TRABECULOPLASTY (SLT) OD (RIGHT EYE)  4/10, 1/12,+1/9/13    RE     SELECTIVE LASER TRABECULOPLASTY (SLT) OS (LEFT EYE)  5/2012     SHOULDER SURGERY      right rotator cuff     Family history:    There is no known family history of hereditary neuropathies or other neuromuscular disorders.    Social History:    He denies tobacco, alcohol, or illicit drug use.     Medical Allergies:     Allergies   Allergen Reactions     Latex Rash     Rash     Current Medications:   Current Outpatient Medications   Medication     alendronate (FOSAMAX) 70 MG tablet     ARIPiprazole (ABILIFY) 2 MG tablet     aspirin (ASA) 81 MG tablet     atorvastatin (LIPITOR) 40 MG tablet     calcium carbonate 500 mg, elemental, (OSCAL;OYSTER SHELL CALCIUM) 500 MG tablet     cholecalciferol 1000 units TABS     COMPRESSION STOCKINGS     Cyanocobalamin (VITAMIN B-12 CR PO)     ferrous sulfate (FE TABS) 325 (65 Fe) MG EC tablet     metoprolol tartrate (LOPRESSOR) 25 MG tablet     mirtazapine (REMERON) 15 MG tablet     Multiple Vitamins-Minerals (MULTIVITAMIN ADULT PO)     sertraline (ZOLOFT) 100 MG tablet     tamsulosin (FLOMAX) 0.4 MG capsule     warfarin (COUMADIN) 5 MG tablet     warfarin ANTICOAGULANT (COUMADIN) 5 MG tablet     polyethylene glycol (MIRALAX) packet     No current facility-administered medications for this visit.      Review of Systems: A review of systems was obtained and was negative except for what was noted above.    Physical examination:    There were no vitals taken for this visit.    General Appearance: NAD    Lower limb edema present    Musculoskeletal:  Pes cavus present.     Neurologic  examination:    Mental status:  Patient is alert, attentive, and oriented x 3.  Language is coherent and fluent without aphasia.  Memory, comprehension and ability to follow commands were intact.       Cranial nerves:  Extraocular movements were full. There was no face, jaw, palate or tongue weakness or atrophy.      Motor exam: Atrophy in left TA. No fasciculations. Manual muscle testing revealed the following MRC grade muscle power:   Right Left   Neck flexion 5    Neck extension: 5    Shoulder abduction:  5 5   Elbow extension: 5 5   Elbow flexion:  5 5   Wrist flexion:  5 5   Wrist extension:  5 5   FDI 4 4   APB 4 4   Hip flexion 5 5   Knee extension 5 5   Dorsiflexion 5 3   Plantar flexion 5 5     Complex motor skills: Mild postural hand tremor.      Sensory exam: Pin loss in left peroneal distribution. Pin otherwise intact in the toes. Vibration mildly reduced for age in toes > ankles.     Gait: with walker    Deep tendon reflexes:   Right Left   Triceps 2 2   Biceps 2 2   Brachioradialis 2 2   Knee jerk 2 2   Ankle jerk 0 0      Assessment:    Noe Florence is a 84 year old man with a multifactorial gait disorder. Neuropathy and peroneal neuropathy are stable. Management remains supportive with focus on gait safety. All questions answered.     Plan:      1. IgM paraprotein: Repeat serum IF today (Last checked 3/19)  2. Balance and falls: He would benefit from additional PT for fall prevention. Referral provided. Also provided physiatry referral for same reason.    3. He wears a soft left ankle support device. Will continue. Hard ankle brace was not well tolerated.   Will try again.   4. Follow up in about 12 months. Sooner if needed.   ---

## 2020-02-11 NOTE — LETTER
2/11/2020     RE: Noe Florence  [address] [facility] [address]     Dear Colleague,    Thank you for referring your patient, Noe Florence, to the [facility]UR[facility] at [facility]. Please see a copy of my visit note below.    Note opened in error.     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     Interval history:  I last saw him 3/5/2019. He has unfortunately had a few falls over the last year. One resulted in a hip fracture and the other an elbow fracture. He was in rehab after both of these falls. He now walks with a walker and sometimes a cane. His prior neurologic deficits are similar to last year. He has a foot drop which is stable. He denies numbness or pain in his feet or hands. No low back pain.  He completed PT but is doing exercises at home.     Prior pertinent laboratory work-up:  3/19: Serum IF showed monoclonal IgM kappa. IgM 111.   3/18: MAG negative. Urine IF showed no monoclonal protein.   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 IgM 113.     Prior pertinent radiology work-up:  2015: CT LS spine showed degenerative changes resulting in mild to moderate spinal canal narrowing from L2-L5.    Prior electrophysiologic work-up:  6/14: Nerve conduction studies/EMG performed at [facility] showed a left common peroneal neuropathy vs L5 radiculopathy. 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   4/18: NCS/EMG showed multiple findings. See report for details.      Past Medical History:   Past Medical History:   Diagnosis Date     Advanced open-angle glaucoma      Atrial fibrillation (H)      CKD (chronic kidney disease), stage III (H) 2005     Colon cancer (H)     Stage II-B colon cancer     Coronary  artery disease     s/p CABG x 2, JEREMY x 2     Diverticulitis      Hyperlipidemia      Hypertension      Ischemic cardiomyopathy      MGUS (monoclonal gammopathy of unknown significance)      Nonsenile cataract     BE     Osteoporosis     left hip fracture     Polymorphic ventricular tachycardia (H)      Polymyalgia rheumatica (H)      PVD (posterior vitreous detachment), both eyes 2005     S/P ablation of atrial flutter 6/20/14    CTI     Stented coronary artery      SVT (supraventricular tachycardia) (H)     PPM/AICD for NSVT     Upper leg DVT (deep venous thromboembolism), chronic (H)     Left     Weight loss, unintentional 2017    15 lb in 4 months     Past Surgical History:  Past Surgical History:   Procedure Laterality Date     C CABG, ARTERY-VEIN, FOUR  2006    LIMA-LAD, SVG-Rt PDA, SVG-OM2, SVG-Diag 1     C CABG, VEIN, SINGLE  1994    1-vessel CABG, SVG->PDRCA      CATARACT IOL, RT/LT Bilateral      COLONOSCOPY  3/13/2014    Procedure: COMBINED COLONOSCOPY, SINGLE BIOPSY/POLYPECTOMY BY BIOPSY;;  Surgeon: Mary Gerber MD;  Location:  GI     COLONOSCOPY N/A 6/22/2015    Procedure: COLONOSCOPY;  Surgeon: Marilin Newman MD;  Location:  GI     COLONOSCOPY N/A 11/7/2018    Procedure: COMBINED COLONOSCOPY, SINGLE OR MULTIPLE BIOPSY/POLYPECTOMY BY BIOPSY;  Surgeon: Roberto Esteban MD;  Location:  GI     EP ICD GENERATOR CHANGE DUAL N/A 12/11/2018    Procedure: EP ICD Generator Change Dual;  Surgeon: Deedee Baird MD;  Location:  HEART CARDIAC CATH LAB     H ABLATION ATRIAL FLUTTER       HEART CATH DRUG ELUTING STENT PLACEMENT  4/19/2012    JEREMY x 2 to LCx     IMPLANT IMPLANTABLE CARDIOVERTER DEFIBRILLATOR  5-    ppm/aicd     KNEE SURGERY      right and left knee surgeries     LAPAROSCOPIC ASSISTED COLECTOMY Right 2/1/2019    Procedure: Laparoscopic Converted to Open Transverse Colectomy with Lysis of Adhesions;  Surgeon: Chanelle Guzmán MD;  Location:  OR      LAPAROSCOPIC ASSISTED COLECTOMY LEFT (DESCENDING)  4/8/2014    Procedure: LAPAROSCOPIC ASSISTED COLECTOMY LEFT (DESCENDING);  Hand assisted Laparoscopic Sigmoid Colectomy , Laparoscopic mobilization of spleenic flexure *Latex Allergy*Anesthesia General with Block;  Surgeon: Chanelle Guzmán MD;  Location: [facility] OR     OPEN REDUCTION INTERNAL FIXATION RODDING INTRAMEDULLAR FEMUR FRACTURE TABLE Left 3/14/2019    Procedure: Open Reduction Internal Fixation Left Femur Intramedullar Nailing;  Surgeon: Srikanth Avalos MD;  Location: [facility] OR     REPAIR VALVE MITRAL  2006     30-mm Medtronic Julian ring      SELECTIVE LASER TRABECULOPLASTY (SLT) OD (RIGHT EYE)  4/10, 1/12,+1/9/13    RE     SELECTIVE LASER TRABECULOPLASTY (SLT) OS (LEFT EYE)  5/2012     SHOULDER SURGERY      right rotator cuff     Family history:    There is no known family history of hereditary neuropathies or other neuromuscular disorders.    Social History:    He denies tobacco, alcohol, or illicit drug use.     Medical Allergies:     Allergies   Allergen Reactions     Latex Rash     Rash     Current Medications:   Current Outpatient Medications   Medication     alendronate (FOSAMAX) 70 MG tablet     ARIPiprazole (ABILIFY) 2 MG tablet     aspirin (ASA) 81 MG tablet     atorvastatin (LIPITOR) 40 MG tablet     calcium carbonate 500 mg, elemental, (OSCAL;OYSTER SHELL CALCIUM) 500 MG tablet     cholecalciferol 1000 units TABS     COMPRESSION STOCKINGS     Cyanocobalamin (VITAMIN B-12 CR PO)     ferrous sulfate (FE TABS) 325 (65 Fe) MG EC tablet     metoprolol tartrate (LOPRESSOR) 25 MG tablet     mirtazapine (REMERON) 15 MG tablet     Multiple Vitamins-Minerals (MULTIVITAMIN ADULT PO)     sertraline (ZOLOFT) 100 MG tablet     tamsulosin (FLOMAX) 0.4 MG capsule     warfarin (COUMADIN) 5 MG tablet     warfarin ANTICOAGULANT (COUMADIN) 5 MG tablet     polyethylene glycol (MIRALAX) packet     No current facility-administered medications for this  visit.      Review of Systems: A review of systems was obtained and was negative except for what was noted above.    Physical examination:    There were no vitals taken for this visit.    General Appearance: NAD    Lower limb edema present    Musculoskeletal:  Pes cavus present.     Neurologic examination:    Mental status:  Patient is alert, attentive, and oriented x 3.  Language is coherent and fluent without aphasia.  Memory, comprehension and ability to follow commands were intact.       Cranial nerves:  Extraocular movements were full. There was no face, jaw, palate or tongue weakness or atrophy.      Motor exam: Atrophy in left TA. No fasciculations. Manual muscle testing revealed the following MRC grade muscle power:   Right Left   Neck flexion 5    Neck extension: 5    Shoulder abduction:  5 5   Elbow extension: 5 5   Elbow flexion:  5 5   Wrist flexion:  5 5   Wrist extension:  5 5   FDI 4 4   APB 4 4   Hip flexion 5 5   Knee extension 5 5   Dorsiflexion 5 3   Plantar flexion 5 5     Complex motor skills: Mild postural hand tremor.      Sensory exam: Pin loss in left peroneal distribution. Pin otherwise intact in the toes. Vibration mildly reduced for age in toes > ankles.     Gait: with walker    Deep tendon reflexes:   Right Left   Triceps 2 2   Biceps 2 2   Brachioradialis 2 2   Knee jerk 2 2   Ankle jerk 0 0      Assessment:    Noe Florence is a 84 year old man with a multifactorial gait disorder. Neuropathy and peroneal neuropathy are stable. Management remains supportive with focus on gait safety. All questions answered.     Plan:      1. IgM paraprotein: Repeat serum IF today (Last checked 3/19)  2. Balance and falls: He would benefit from additional PT for fall prevention. Referral provided. Also provided physiatry referral for same reason.    3. He wears a soft left ankle support device. Will continue. Hard ankle brace was not well tolerated.   Will try again.   4. Follow up in about 12 months.  Sooner if needed.   ---    Jase Duarte MD

## 2020-02-20 ENCOUNTER — TELEPHONE (OUTPATIENT)
Dept: PHYSICAL MEDICINE AND REHAB | Facility: CLINIC | Age: 85
End: 2020-02-20

## 2020-02-20 NOTE — TELEPHONE ENCOUNTER
"[facility] Call Center    Phone Message    May a detailed message be left on voicemail: yes     Reason for Call: Other: per spouse, pt has order to see PMR. referral has diagnosis of \"gait problems\" but is not specific as to who the patient should see. please review patient records and communicate with spouse who the patient should be seen by.     Action Taken: Message routed to:  [facility] ([facility]):  pmr    Travel Screening: Not Applicable                                                                        "

## 2020-02-20 NOTE — PROGRESS NOTES
Addendum  2/20/20  Spoke with Rica today. Sha missed his warfarin dose last night.He will tske 7.5mgs today. Next INR tomorrow 2/21/20.  Jeremy Ho [facility]

## 2020-02-21 ENCOUNTER — HOSPITAL ENCOUNTER (OUTPATIENT)
Facility: CLINIC | Age: 85
Setting detail: SPECIMEN
Discharge: HOME | End: 2020-02-21
Admitting: INTERNAL MEDICINE
Payer: COMMERCIAL

## 2020-02-21 ENCOUNTER — ANCILLARY PROCEDURE (OUTPATIENT)
Dept: CT IMAGING | Facility: CLINIC | Age: 85
End: 2020-02-21
Attending: INTERNAL MEDICINE
Payer: COMMERCIAL

## 2020-02-21 ENCOUNTER — ANTICOAGULATION THERAPY VISIT (OUTPATIENT)
Dept: ANTICOAGULATION | Facility: CLINIC | Age: 85
End: 2020-02-21

## 2020-02-21 DIAGNOSIS — I82.409 DEEP VEIN THROMBOSIS (DVT) (H): ICD-10-CM

## 2020-02-21 DIAGNOSIS — C18.9 MALIGNANT NEOPLASM OF COLON, UNSPECIFIED PART OF COLON (H): ICD-10-CM

## 2020-02-21 DIAGNOSIS — Z79.01 LONG TERM CURRENT USE OF ANTICOAGULANT THERAPY: ICD-10-CM

## 2020-02-21 DIAGNOSIS — I48.91 ATRIAL FIBRILLATION, UNSPECIFIED TYPE (H): ICD-10-CM

## 2020-02-21 DIAGNOSIS — I48.91 ATRIAL FIBRILLATION (H): ICD-10-CM

## 2020-02-21 LAB
ALBUMIN SERPL-MCNC: 3.2 G/DL (ref 3.4–5)
ALP SERPL-CCNC: 102 U/L (ref 40–150)
ALT SERPL W P-5'-P-CCNC: 23 U/L (ref 0–70)
ANION GAP SERPL CALCULATED.3IONS-SCNC: 4 MMOL/L (ref 3–14)
AST SERPL W P-5'-P-CCNC: 19 U/L (ref 0–45)
BASOPHILS # BLD AUTO: 0 10E9/L (ref 0–0.2)
BASOPHILS NFR BLD AUTO: 0.3 %
BILIRUB SERPL-MCNC: 0.3 MG/DL (ref 0.2–1.3)
BUN SERPL-MCNC: 46 MG/DL (ref 7–30)
CALCIUM SERPL-MCNC: 8.9 MG/DL (ref 8.5–10.1)
CEA SERPL-MCNC: 4.7 UG/L (ref 0–2.5)
CHLORIDE SERPL-SCNC: 106 MMOL/L (ref 94–109)
CO2 SERPL-SCNC: 28 MMOL/L (ref 20–32)
CREAT BLD-MCNC: 1.4 MG/DL (ref 0.66–1.25)
CREAT SERPL-MCNC: 1.34 MG/DL (ref 0.66–1.25)
DIFFERENTIAL METHOD BLD: ABNORMAL
EOSINOPHIL # BLD AUTO: 0.2 10E9/L (ref 0–0.7)
EOSINOPHIL NFR BLD AUTO: 2.4 %
ERYTHROCYTE [DISTWIDTH] IN BLOOD BY AUTOMATED COUNT: 13.6 % (ref 10–15)
GFR SERPL CREATININE-BSD FRML MDRD: 48 ML/MIN/{1.73_M2}
GFR SERPL CREATININE-BSD FRML MDRD: 48 ML/MIN/{1.73_M2}
GLUCOSE SERPL-MCNC: 52 MG/DL (ref 70–99)
HCT VFR BLD AUTO: 36.4 % (ref 40–53)
HGB BLD-MCNC: 11.3 G/DL (ref 13.3–17.7)
IMM GRANULOCYTES # BLD: 0 10E9/L (ref 0–0.4)
IMM GRANULOCYTES NFR BLD: 0.3 %
INR PPP: 1.47 (ref 0.86–1.14)
LYMPHOCYTES # BLD AUTO: 0.8 10E9/L (ref 0.8–5.3)
LYMPHOCYTES NFR BLD AUTO: 10.4 %
MCH RBC QN AUTO: 30.2 PG (ref 26.5–33)
MCHC RBC AUTO-ENTMCNC: 31 G/DL (ref 31.5–36.5)
MCV RBC AUTO: 97 FL (ref 78–100)
MONOCYTES # BLD AUTO: 0.9 10E9/L (ref 0–1.3)
MONOCYTES NFR BLD AUTO: 11.5 %
NEUTROPHILS # BLD AUTO: 5.5 10E9/L (ref 1.6–8.3)
NEUTROPHILS NFR BLD AUTO: 75.1 %
NRBC # BLD AUTO: 0 10*3/UL
NRBC BLD AUTO-RTO: 0 /100
PLATELET # BLD AUTO: 161 10E9/L (ref 150–450)
POTASSIUM SERPL-SCNC: 4.7 MMOL/L (ref 3.4–5.3)
PROT SERPL-MCNC: 7.1 G/DL (ref 6.8–8.8)
RBC # BLD AUTO: 3.74 10E12/L (ref 4.4–5.9)
SODIUM SERPL-SCNC: 138 MMOL/L (ref 133–144)
WBC # BLD AUTO: 7.4 10E9/L (ref 4–11)

## 2020-02-21 PROCEDURE — 85610 PROTHROMBIN TIME: CPT | Performed by: INTERNAL MEDICINE

## 2020-02-21 PROCEDURE — 82565 ASSAY OF CREATININE: CPT

## 2020-02-21 RX ORDER — IOPAMIDOL 755 MG/ML
96 INJECTION, SOLUTION INTRAVASCULAR ONCE
Status: COMPLETED | OUTPATIENT
Start: 2020-02-21 | End: 2020-02-21

## 2020-02-21 RX ADMIN — IOPAMIDOL 96 ML: 755 INJECTION, SOLUTION INTRAVASCULAR at 11:41

## 2020-02-21 NOTE — PROGRESS NOTES
ANTICOAGULATION FOLLOW-UP CLINIC VISIT    Patient Name:  Noe Florence  Date:  2020  Contact Type:  Telephone    SUBJECTIVE:         OBJECTIVE    INR   Date Value Ref Range Status   2020 1.47 (H) 0.86 - 1.14 Final       ASSESSMENT / PLAN  INR assessment SUB    Recheck INR In: 3 WEEKS    INR Location Clinic      Anticoagulation Summary  As of 2020    INR goal:   1.5-2.5   TTR:   51.4 % (11.4 mo)   INR used for dosin.47! (2020)   Warfarin maintenance plan:   5 mg (5 mg x 1) every day   Full warfarin instructions:   5 mg every day   Weekly warfarin total:   35 mg   Plan last modified:   Ciro Sharp RN (2019)   Next INR check:   3/13/2020   Priority:   Maintenance   Target end date:   Indefinite    Indications    Atrial fibrillation (H) [I48.91] [I48.91]  Long-term (current) use of anticoagulants [Z79.01] [Z79.01]  Deep vein thrombosis (DVT) (H) [I82.409]             Anticoagulation Episode Summary     INR check location:       Preferred lab:       Send INR reminders to:   [facility]    Comments:   Patient contact number: [phone] Hx of Falls/Bleed  Can leave results with Rica (friend)  [facility] see's pt. 19 Restarted Warfarin due to DVT.       Anticoagulation Care Providers     Provider Role Specialty Phone number    Frida Desai MD [facility] Cardiology [phone]            See the Encounter Report to view Anticoagulation Flowsheet and Dosing Calendar (Go to Encounters tab in chart review, and find the Anticoagulation Therapy Visit)  Left message for patient with results and dosing recommendations. Asked patient to call back to report any missed doses, falls, signs and symptoms of bleeding or clotting, any changes in health, medication, or diet. Asked patient to call back with any questions or concerns.      Leena Abebe RN

## 2020-02-24 ENCOUNTER — ONCOLOGY VISIT (OUTPATIENT)
Dept: ONCOLOGY | Facility: CLINIC | Age: 85
End: 2020-02-24
Attending: INTERNAL MEDICINE
Payer: COMMERCIAL

## 2020-02-24 VITALS
OXYGEN SATURATION: 99 % | DIASTOLIC BLOOD PRESSURE: 76 MMHG | TEMPERATURE: 97.7 F | WEIGHT: 159.2 LBS | RESPIRATION RATE: 20 BRPM | HEART RATE: 70 BPM | BODY MASS INDEX: 24.99 KG/M2 | SYSTOLIC BLOOD PRESSURE: 149 MMHG | HEIGHT: 67 IN

## 2020-02-24 DIAGNOSIS — C18.9 MALIGNANT NEOPLASM OF COLON, UNSPECIFIED PART OF COLON (H): Primary | ICD-10-CM

## 2020-02-24 PROCEDURE — G0463 HOSPITAL OUTPT CLINIC VISIT: HCPCS | Mod: ZF

## 2020-02-24 PROCEDURE — 99213 OFFICE O/P EST LOW 20 MIN: CPT | Mod: ZP | Performed by: INTERNAL MEDICINE

## 2020-02-24 ASSESSMENT — PAIN SCALES - GENERAL: PAINLEVEL: NO PAIN (0)

## 2020-02-24 ASSESSMENT — MIFFLIN-ST. JEOR: SCORE: 1370.76

## 2020-02-24 NOTE — NURSING NOTE
"Oncology Rooming Note    February 24, 2020 5:43 PM   Noe Florence is a 84 year old male who presents for:    Chief Complaint   Patient presents with     Oncology Clinic Visit     Return: Polymyalgia rheumatica (H)     Initial Vitals: BP (!) 149/76   Pulse 70   Temp 97.7  F (36.5  C) (Oral)   Resp 20   Ht 1.702 m (5' 7\")   Wt 72.2 kg (159 lb 3.2 oz)   SpO2 99%   BMI 24.93 kg/m   Estimated body mass index is 24.93 kg/m  as calculated from the following:    Height as of this encounter: 1.702 m (5' 7\").    Weight as of this encounter: 72.2 kg (159 lb 3.2 oz). Body surface area is 1.85 meters squared.  No Pain (0) Comment: Data Unavailable   No LMP for male patient.  Allergies reviewed: Yes  Medications reviewed: Yes    Medications: Medication refills not needed today.  Pharmacy name entered into [facility]: [facility] [address] [place]    Clinical concerns: PAUL Willett CMA            "

## 2020-02-24 NOTE — Clinical Note
2/24/2020       RE: Noe Florence  [address] [facility] [address]     Dear Colleague,    Thank you for referring your patient, Noe Florence, to the [facility]. Please see a copy of my visit note below.    HOMER  F/u 6 mo with CEA    Again, thank you for allowing me to participate in the care of your patient.      Sincerely,    Francisco Gilliam MD

## 2020-02-24 NOTE — PROGRESS NOTES
HOMER  F/u 6 mo with CEA    Sha Madrid is here today in follow-up of colon cancer.  He has had 2 cancers, stage I many years ago and then in February 2019 a stage II cancer.  He is not had any adjuvant treatment.  He is here today for surveillance for recurrence.  He is continued to recover very slowly from his prior injuries, but is still fairly limited in activity.  He continues to use a walker to ambulate.  He thinks his appetite is okay.  He does not have any problems with his bowel habits.  He does not have any significant pain.    On physical exam he appears his stated age and quite frail.  His vital signs are unremarkable.  He has no icterus.  He has no palpable adenopathy in his neck or supraclavicular spaces.  His lungs are clear to auscultation without dullness to percussion.  His heart rate and rhythm are regular.  His abdomen is soft and nontender without palpable organomegaly.  He has mild peripheral edema no tenderness in his calves or thighs.  His speech is fluent.  He walks with the aid of a walker and required significant assistance to mount the exam table.    I reviewed with the patient and his wife his lab work and CT scan.  He has normal electrolytes and mildly abnormal renal function consistent with prior values.  His liver enzymes are unremarkable.  His blood counts are unremarkable.  His CEA level is mildly elevated at 4.7, but consistent with his longstanding values.  On his CT scan there is nothing to suggest evidence of recurrence.    Assessment/plan: Resected colon cancer currently without evidence of disease.  I think it of modest value to continue surveillance for recurrence primarily for planning purposes even though we may not be able to intervene if he does have recurrence.  We will see him back in 6 months for physical exam and lab work.  He does not need another CT scan for at least a year.

## 2020-02-24 NOTE — LETTER
2/24/2020      RE: Noe Florence  [address] [facility] [address]       HOMER  F/u 6 mo with CEA    Sha Madrid is here today in follow-up of colon cancer.  He has had 2 cancers, stage I many years ago and then in February 2019 a stage II cancer.  He is not had any adjuvant treatment.  He is here today for surveillance for recurrence.  He is continued to recover very slowly from his prior injuries, but is still fairly limited in activity.  He continues to use a walker to ambulate.  He thinks his appetite is okay.  He does not have any problems with his bowel habits.  He does not have any significant pain.    On physical exam he appears his stated age and quite frail.  His vital signs are unremarkable.  He has no icterus.  He has no palpable adenopathy in his neck or supraclavicular spaces.  His lungs are clear to auscultation without dullness to percussion.  His heart rate and rhythm are regular.  His abdomen is soft and nontender without palpable organomegaly.  He has mild peripheral edema no tenderness in his calves or thighs.  His speech is fluent.  He walks with the aid of a walker and required significant assistance to mount the exam table.    I reviewed with the patient and his wife his lab work and CT scan.  He has normal electrolytes and mildly abnormal renal function consistent with prior values.  His liver enzymes are unremarkable.  His blood counts are unremarkable.  His CEA level is mildly elevated at 4.7, but consistent with his longstanding values.  On his CT scan there is nothing to suggest evidence of recurrence.    Assessment/plan: Resected colon cancer currently without evidence of disease.  I think it of modest value to continue surveillance for recurrence primarily for planning purposes even though we may not be able to intervene if he does have recurrence.  We will see him back in 6 months for physical exam and lab work.  He does not need another CT scan for at least a year.    Edward  Noe Gilliam MD

## 2020-02-28 ENCOUNTER — ANTICOAGULATION THERAPY VISIT (OUTPATIENT)
Dept: ANTICOAGULATION | Facility: CLINIC | Age: 85
End: 2020-02-28

## 2020-02-28 DIAGNOSIS — Z79.01 LONG TERM CURRENT USE OF ANTICOAGULANT THERAPY: ICD-10-CM

## 2020-02-28 DIAGNOSIS — I48.91 ATRIAL FIBRILLATION, UNSPECIFIED TYPE (H): ICD-10-CM

## 2020-02-28 DIAGNOSIS — I82.409 DEEP VEIN THROMBOSIS (DVT) (H): ICD-10-CM

## 2020-02-28 NOTE — PROGRESS NOTES
Rica bullock.  Sha forgot to take warfarin last night, 2/27/2020.  He will take a 7.5 mg dose of warfarin today.  Danya Edwards RN

## 2020-02-28 NOTE — PROGRESS NOTES
2/28/2020:  Addendum:  Rica bullock.  Sha forgot to take warfarin last night, 2/27/2020.  He will take a 7.5 mg dose of warfarin today.  Danya Edwards RN

## 2020-03-05 ENCOUNTER — ANTICOAGULATION THERAPY VISIT (OUTPATIENT)
Dept: ANTICOAGULATION | Facility: CLINIC | Age: 85
End: 2020-03-05

## 2020-03-05 DIAGNOSIS — I82.409 DEEP VEIN THROMBOSIS (DVT) (H): ICD-10-CM

## 2020-03-05 DIAGNOSIS — Z79.01 LONG TERM CURRENT USE OF ANTICOAGULANT THERAPY: ICD-10-CM

## 2020-03-05 DIAGNOSIS — I48.91 ATRIAL FIBRILLATION (H): ICD-10-CM

## 2020-03-05 DIAGNOSIS — I48.91 ATRIAL FIBRILLATION, UNSPECIFIED TYPE (H): ICD-10-CM

## 2020-03-05 LAB — INR PPP: 1.47 (ref 0.86–1.14)

## 2020-03-05 NOTE — PROGRESS NOTES
ANTICOAGULATION FOLLOW-UP CLINIC VISIT    Patient Name:  Noe Florence  Date:  3/5/2020  Contact Type:  Telephone    SUBJECTIVE:  Patient Findings     Comments:   Change in the maintenance plan after discussion with [facility]         Clinical Outcomes     Comments:   Change in the maintenance plan after discussion with [facility]            OBJECTIVE    INR   Date Value Ref Range Status   2020 1.47 (H) 0.86 - 1.14 Final       ASSESSMENT / PLAN  INR assessment SUB    Recheck INR In: 2 WEEKS    INR Location Clinic      Anticoagulation Summary  As of 3/5/2020    INR goal:   1.5-2.5   TTR:   49.8 % (11.4 mo)   INR used for dosin.47! (3/5/2020)   Warfarin maintenance plan:   7.5 mg (5 mg x 1.5) every Thu; 5 mg (5 mg x 1) all other days   Full warfarin instructions:   7.5 mg every Thu; 5 mg all other days   Weekly warfarin total:   37.5 mg   Plan last modified:   Sary Parisi RN (3/5/2020)   Next INR check:   3/19/2020   Priority:   High   Target end date:   Indefinite    Indications    Atrial fibrillation (H) [I48.91] [I48.91]  Long-term (current) use of anticoagulants [Z79.01] [Z79.01]  Deep vein thrombosis (DVT) (H) [I82.409]             Anticoagulation Episode Summary     INR check location:       Preferred lab:       Send INR reminders to:   [facility]    Comments:   Patient contact number: [phone] Hx of Falls/Bleed  Can leave results with Rica (friend)  Restarted Warfarin due to DVT.       Anticoagulation Care Providers     Provider Role Specialty Phone number    Frida Desai MD [facility] Cardiology [phone]            See the Encounter Report to view Anticoagulation Flowsheet and Dosing Calendar (Go to Encounters tab in chart review, and find the Anticoagulation Therapy Visit)    Left message for patient with results and dosing recommendations. Asked patient to call back to report any missed doses, falls, signs and symptoms of bleeding or clotting, any changes in health, medication,  or diet. Asked patient to call back with any questions or concerns.  See also above notation     Sary Parisi RN

## 2020-03-11 ENCOUNTER — OFFICE VISIT (OUTPATIENT)
Dept: PHYSICAL MEDICINE AND REHAB | Facility: CLINIC | Age: 85
End: 2020-03-11
Payer: COMMERCIAL

## 2020-03-11 VITALS
HEIGHT: 67 IN | SYSTOLIC BLOOD PRESSURE: 128 MMHG | DIASTOLIC BLOOD PRESSURE: 65 MMHG | BODY MASS INDEX: 24.98 KG/M2 | HEART RATE: 56 BPM | WEIGHT: 159.17 LBS

## 2020-03-11 DIAGNOSIS — M21.372 LEFT FOOT DROP: ICD-10-CM

## 2020-03-11 DIAGNOSIS — Z91.81 PERSONAL HISTORY OF FALL: ICD-10-CM

## 2020-03-11 DIAGNOSIS — R26.2 IMPAIRED AMBULATION: Primary | ICD-10-CM

## 2020-03-11 DIAGNOSIS — G62.9 NEUROPATHY: ICD-10-CM

## 2020-03-11 ASSESSMENT — PAIN SCALES - GENERAL: PAINLEVEL: NO PAIN (0)

## 2020-03-11 ASSESSMENT — MIFFLIN-ST. JEOR: SCORE: 1370.63

## 2020-03-11 NOTE — PROGRESS NOTES
"[facility] PM&R Clinic - New Patient Note   Noe Florence  [id number]  Date of Evaluation: 3/11/2020  Referring Physician: Dr. Roberto Duarte  Reason for consult: \"Problems with my feet\"  HPI:  Noe Florence is an 83 y/o M who is referred to the PM&R clinic today for evaluation of decreased balance and falls.  He tells me he has \"problems with my feet\" due to neuropathy and left foot drop.  He has had a few falls over the past year which unfortunately have lead to serious injuries including a left intertrochanteric femur fracture in 3/2019 s/p ORIF (and subsequent admission to the [facility] acute rehab unit), and a R distal humerus fracture in 5/2019 which was non-surgically managed.  Both falls seem to have occurred due to slipping on ice.   After his humeral fracture he was discharged to a transitional care unit, then subsequently discharged home with home health and then transitioned to outpatient therapies.  He says therapies are on hold at this time and he was advised to \"come back in the Spring\".      Functionally he ambulates with with a single end cane and an Ossur foot up AFO.  He tried using a traditional AFO but was painful for him.  Per review of last PT note, he was adequately ambulating household distances and it was recommended that a walker is used for primary mode of ambulation independently, and a cane only with supervision.  He was discharged, with the intention to follow up with balance PT in the spring to work on community ambulation distances.   He requires assistance with lower body dressing, particularly with putting his leg through the pants holes.    He lives in a home with 3 steps to enter and a handrail.  There are 7+7 stairs with b/l handrails but since discharging home from his fall he has been sleeping on the main floor.  He is working on 7 stairs at this time, but not yet able to work up to 14.  They have removed all rugs from the house.  Per discharge summary from ARU, " there seems to have been some hoarding/clutter issues, but I did not ask about this today.    PMH:  Past Medical History:   Diagnosis Date     Advanced open-angle glaucoma      Atrial fibrillation (H)      CKD (chronic kidney disease), stage III (H) 2005     Colon cancer (H)     Stage II-B colon cancer     Coronary artery disease     s/p CABG x 2, JEREMY x 2     Diverticulitis      Hyperlipidemia      Hypertension      Ischemic cardiomyopathy      MGUS (monoclonal gammopathy of unknown significance)      Nonsenile cataract     BE     Osteoporosis     left hip fracture     Polymorphic ventricular tachycardia (H)      Polymyalgia rheumatica (H)      PVD (posterior vitreous detachment), both eyes 2005     S/P ablation of atrial flutter 6/20/14    CTI     Stented coronary artery      SVT (supraventricular tachycardia) (H)     PPM/AICD for NSVT     Upper leg DVT (deep venous thromboembolism), chronic (H)     Left     Weight loss, unintentional 2017    15 lb in 4 months       PSH:  Past Surgical History:   Procedure Laterality Date     C CABG, ARTERY-VEIN, FOUR  2006    LIMA-LAD, SVG-Rt PDA, SVG-OM2, SVG-Diag 1     C CABG, VEIN, SINGLE  1994    1-vessel CABG, SVG->PDRCA      CATARACT IOL, RT/LT Bilateral      COLONOSCOPY  3/13/2014    Procedure: COMBINED COLONOSCOPY, SINGLE BIOPSY/POLYPECTOMY BY BIOPSY;;  Surgeon: Mary Gerber MD;  Location:  GI     COLONOSCOPY N/A 6/22/2015    Procedure: COLONOSCOPY;  Surgeon: Marilin Newman MD;  Location:  GI     COLONOSCOPY N/A 11/7/2018    Procedure: COMBINED COLONOSCOPY, SINGLE OR MULTIPLE BIOPSY/POLYPECTOMY BY BIOPSY;  Surgeon: Roberto Esteban MD;  Location:  GI     EP ICD GENERATOR CHANGE DUAL N/A 12/11/2018    Procedure: EP ICD Generator Change Dual;  Surgeon: Deedee Baird MD;  Location:  HEART CARDIAC CATH LAB     H ABLATION ATRIAL FLUTTER       HEART CATH DRUG ELUTING STENT PLACEMENT  4/19/2012    JEREMY x 2 to LCx     IMPLANT IMPLANTABLE  CARDIOVERTER DEFIBRILLATOR  5-    ppm/aicd     KNEE SURGERY      right and left knee surgeries     LAPAROSCOPIC ASSISTED COLECTOMY Right 2/1/2019    Procedure: Laparoscopic Converted to Open Transverse Colectomy with Lysis of Adhesions;  Surgeon: Chanelle Guzmán MD;  Location: [facility] OR     LAPAROSCOPIC ASSISTED COLECTOMY LEFT (DESCENDING)  4/8/2014    Procedure: LAPAROSCOPIC ASSISTED COLECTOMY LEFT (DESCENDING);  Hand assisted Laparoscopic Sigmoid Colectomy , Laparoscopic mobilization of spleenic flexure *Latex Allergy*Anesthesia General with Block;  Surgeon: Chanelle Guzmán MD;  Location: [facility] OR     OPEN REDUCTION INTERNAL FIXATION RODDING INTRAMEDULLAR FEMUR FRACTURE TABLE Left 3/14/2019    Procedure: Open Reduction Internal Fixation Left Femur Intramedullar Nailing;  Surgeon: Srikanth Avalos MD;  Location: [facility] OR     REPAIR VALVE MITRAL  2006     30-mm Medtronic Julian ring      SELECTIVE LASER TRABECULOPLASTY (SLT) OD (RIGHT EYE)  4/10, 1/12,+1/9/13    RE     SELECTIVE LASER TRABECULOPLASTY (SLT) OS (LEFT EYE)  5/2012     SHOULDER SURGERY      right rotator cuff       Allergies:  Allergies   Allergen Reactions     Latex Rash     Rash       Medications:  Current Outpatient Medications   Medication     alendronate (FOSAMAX) 70 MG tablet     ARIPiprazole (ABILIFY) 2 MG tablet     aspirin (ASA) 81 MG tablet     atorvastatin (LIPITOR) 40 MG tablet     calcium carbonate 500 mg, elemental, (OSCAL;OYSTER SHELL CALCIUM) 500 MG tablet     cholecalciferol 1000 units TABS     COMPRESSION STOCKINGS     Cyanocobalamin (VITAMIN B-12 CR PO)     ferrous sulfate (FE TABS) 325 (65 Fe) MG EC tablet     metoprolol tartrate (LOPRESSOR) 25 MG tablet     mirtazapine (REMERON) 15 MG tablet     Multiple Vitamins-Minerals (MULTIVITAMIN ADULT PO)     polyethylene glycol (MIRALAX) packet     sertraline (ZOLOFT) 100 MG tablet     tamsulosin (FLOMAX) 0.4 MG capsule     warfarin (COUMADIN) 5 MG tablet      warfarin ANTICOAGULANT (COUMADIN) 5 MG tablet     No current facility-administered medications for this visit.        Social History:  Social History     Socioeconomic History     Marital status:      Spouse name: Not on file     Number of children: Not on file     Years of education: Not on file     Highest education level: Not on file   Occupational History     Not on file   Social Needs     Financial resource strain: Not on file     Food insecurity     Worry: Not on file     Inability: Not on file     Transportation needs     Medical: Not on file     Non-medical: Not on file   Tobacco Use     Smoking status: Former Smoker     Types: Cigarettes, Cigars     Last attempt to quit: 1968     Years since quittin.2     Smokeless tobacco: Never Used   Substance and Sexual Activity     Alcohol use: Yes     Alcohol/week: 0.0 standard drinks     Comment: 2-3 drinks twice weekly     Drug use: No     Sexual activity: Not on file   Lifestyle     Physical activity     Days per week: Not on file     Minutes per session: Not on file     Stress: Not on file   Relationships     Social connections     Talks on phone: Not on file     Gets together: Not on file     Attends Restoration service: Not on file     Active member of club or organization: Not on file     Attends meetings of clubs or organizations: Not on file     Relationship status: Not on file     Intimate partner violence     Fear of current or ex partner: Not on file     Emotionally abused: Not on file     Physically abused: Not on file     Forced sexual activity: Not on file   Other Topics Concern     Parent/sibling w/ CABG, MI or angioplasty before 65F 55M? Not Asked   Social History Narrative     Not on file     Review of Systems - History obtained from chart review and the patient  General ROS: negative for - chills or fever  Respiratory ROS: no cough, shortness of breath, or wheezing  Cardiovascular ROS: no chest pain or dyspnea on  "exertion  Musculoskeletal ROS: positive for - muscular weakness  Neurological ROS: positive for - Falls, foot drop  Dermatological ROS: negative for rash  Functional Hx:  As per HPI    Physical Exam:  /65 (BP Location: Left arm, Patient Position: Chair, Cuff Size: Adult Regular)   Pulse 56   Ht 1.702 m (5' 7\")   Wt 72.2 kg (159 lb 2.8 oz)   BMI 24.93 kg/m      Gen: NAD  Skin: No rashes noted  HEENT: NC/AT  Pulm: Non-labored breathing  GI: Soft, NT/ND  Ext: 1+ edema, stasis color changes, no calf tenderness  Neuro: Follows commands, answers appropriately  MMT 5/5 RLE  LLE [id number]  Gait: Forward posture, step through, good foot clearance    Assessment:  Noe Florence is an 83 y/o M who is referred to the PM&R clinic today for evaluation of decreased balance and falls.    Recommendations:  -Mr. Florence has had falls in the past year, two of which lead to serious injuries, however these seem to have been mechanical in nature due to slipping on ice.  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  -He has already removed tripping hazards in the home.  Per discharge summary from ARU there was concern for significant clutter/hoarding tendencies.  I did not ask him about this today, but if this is still the case then clearing this out will also decrease his falls risk.  -Encourage stretching and maintaining an adequate home exercise program.    -Ossur foot up brace has been working well for him so he should continue to wear this.  Another potential option could be a Turbomed brace which attaches to the outside of the shoe.  -Will place new PT and OT referrals to work on balance, community level ambulation, optimize posture and gait, and ADLs (I.e., LBD).  -May return to clinic on an as needed basis only  Montez Whipple MD  Department of Rehabilitation Medicine  Pager: [phone]  Time Spent on this Encounter "   I, Montez Whipple, spent a total of 45 minutes face-to-face or managing the care of Noe Florence. Over 50% of my time was spent counseling the patient and coordinating care. See note for details.

## 2020-03-11 NOTE — LETTER
"3/11/2020       RE: Noe Florence  [address] [facility] [address]     Dear Colleague,    Thank you for referring your patient, Noe Florence, to the [facility] PHYSICAL MEDICINE AND REHABILITATION at [facility]. Please see a copy of my visit note below.    [facility] PM&R Clinic - New Patient Note   Noe Florence  [id number]  Date of Evaluation: 3/11/2020  Referring Physician: Dr. Roberto Duarte  Reason for consult: \"Problems with my feet\"  HPI:  Noe Florence is an 85 y/o M who is referred to the PM&R clinic today for evaluation of decreased balance and falls.  He tells me he has \"problems with my feet\" due to neuropathy and left foot drop.  He has had a few falls over the past year which unfortunately have lead to serious injuries including a left intertrochanteric femur fracture in 3/2019 s/p ORIF (and subsequent admission to the [facility] acute rehab unit), and a R distal humerus fracture in 5/2019 which was non-surgically managed.  Both falls seem to have occurred due to slipping on ice.   After his humeral fracture he was discharged to a transitional care unit, then subsequently discharged home with home health and then transitioned to outpatient therapies.  He says therapies are on hold at this time and he was advised to \"come back in the Spring\".      Functionally he ambulates with with a single end cane and an Ossur foot up AFO.  He tried using a traditional AFO but was painful for him.  Per review of last PT note, he was adequately ambulating household distances and it was recommended that a walker is used for primary mode of ambulation independently, and a cane only with supervision.  He was discharged, with the intention to follow up with balance PT in the spring to work on community ambulation distances.   He requires assistance with lower body dressing, particularly with putting his leg through the pants holes.    He lives in a home with 3 " steps to enter and a handrail.  There are 7+7 stairs with b/l handrails but since discharging home from his fall he has been sleeping on the main floor.  He is working on 7 stairs at this time, but not yet able to work up to 14.  They have removed all rugs from the house.  Per discharge summary from ARU, there seems to have been some hoarding/clutter issues, but I did not ask about this today.    PMH:  Past Medical History:   Diagnosis Date     Advanced open-angle glaucoma      Atrial fibrillation (H)      CKD (chronic kidney disease), stage III (H) 2005     Colon cancer (H)     Stage II-B colon cancer     Coronary artery disease     s/p CABG x 2, JEREMY x 2     Diverticulitis      Hyperlipidemia      Hypertension      Ischemic cardiomyopathy      MGUS (monoclonal gammopathy of unknown significance)      Nonsenile cataract     BE     Osteoporosis     left hip fracture     Polymorphic ventricular tachycardia (H)      Polymyalgia rheumatica (H)      PVD (posterior vitreous detachment), both eyes 2005     S/P ablation of atrial flutter 6/20/14    CTI     Stented coronary artery      SVT (supraventricular tachycardia) (H)     PPM/AICD for NSVT     Upper leg DVT (deep venous thromboembolism), chronic (H)     Left     Weight loss, unintentional 2017    15 lb in 4 months       PSH:  Past Surgical History:   Procedure Laterality Date     C CABG, ARTERY-VEIN, FOUR  2006    LIMA-LAD, SVG-Rt PDA, SVG-OM2, SVG-Diag 1     C CABG, VEIN, SINGLE  1994    1-vessel CABG, SVG->PDRCA      CATARACT IOL, RT/LT Bilateral      COLONOSCOPY  3/13/2014    Procedure: COMBINED COLONOSCOPY, SINGLE BIOPSY/POLYPECTOMY BY BIOPSY;;  Surgeon: Mary Gerber MD;  Location:  GI     COLONOSCOPY N/A 6/22/2015    Procedure: COLONOSCOPY;  Surgeon: Marilin Newman MD;  Location:  GI     COLONOSCOPY N/A 11/7/2018    Procedure: COMBINED COLONOSCOPY, SINGLE OR MULTIPLE BIOPSY/POLYPECTOMY BY BIOPSY;  Surgeon: Roberto Esteban MD;   Location: [facility] GI     EP ICD GENERATOR CHANGE DUAL N/A 12/11/2018    Procedure: EP ICD Generator Change Dual;  Surgeon: Deedee Baird MD;  Location:  HEART CARDIAC CATH LAB     H ABLATION ATRIAL FLUTTER       HEART CATH DRUG ELUTING STENT PLACEMENT  4/19/2012    JEREMY x 2 to LCx     IMPLANT IMPLANTABLE CARDIOVERTER DEFIBRILLATOR  5-    ppm/aicd     KNEE SURGERY      right and left knee surgeries     LAPAROSCOPIC ASSISTED COLECTOMY Right 2/1/2019    Procedure: Laparoscopic Converted to Open Transverse Colectomy with Lysis of Adhesions;  Surgeon: Chanelle Guzmán MD;  Location: [facility] OR     LAPAROSCOPIC ASSISTED COLECTOMY LEFT (DESCENDING)  4/8/2014    Procedure: LAPAROSCOPIC ASSISTED COLECTOMY LEFT (DESCENDING);  Hand assisted Laparoscopic Sigmoid Colectomy , Laparoscopic mobilization of spleenic flexure *Latex Allergy*Anesthesia General with Block;  Surgeon: Chanelle Guzmán MD;  Location: [facility] OR     OPEN REDUCTION INTERNAL FIXATION RODDING INTRAMEDULLAR FEMUR FRACTURE TABLE Left 3/14/2019    Procedure: Open Reduction Internal Fixation Left Femur Intramedullar Nailing;  Surgeon: Srikanth Avalos MD;  Location:  OR     REPAIR VALVE MITRAL  2006     30-mm Medtronic Julian ring      SELECTIVE LASER TRABECULOPLASTY (SLT) OD (RIGHT EYE)  4/10, 1/12,+1/9/13    RE     SELECTIVE LASER TRABECULOPLASTY (SLT) OS (LEFT EYE)  5/2012     SHOULDER SURGERY      right rotator cuff       Allergies:  Allergies   Allergen Reactions     Latex Rash     Rash       Medications:  Current Outpatient Medications   Medication     alendronate (FOSAMAX) 70 MG tablet     ARIPiprazole (ABILIFY) 2 MG tablet     aspirin (ASA) 81 MG tablet     atorvastatin (LIPITOR) 40 MG tablet     calcium carbonate 500 mg, elemental, (OSCAL;OYSTER SHELL CALCIUM) 500 MG tablet     cholecalciferol 1000 units TABS     COMPRESSION STOCKINGS     Cyanocobalamin (VITAMIN B-12 CR PO)     ferrous sulfate (FE TABS) 325 (65 Fe) MG EC tablet      metoprolol tartrate (LOPRESSOR) 25 MG tablet     mirtazapine (REMERON) 15 MG tablet     Multiple Vitamins-Minerals (MULTIVITAMIN ADULT PO)     polyethylene glycol (MIRALAX) packet     sertraline (ZOLOFT) 100 MG tablet     tamsulosin (FLOMAX) 0.4 MG capsule     warfarin (COUMADIN) 5 MG tablet     warfarin ANTICOAGULANT (COUMADIN) 5 MG tablet     No current facility-administered medications for this visit.        Social History:  Social History     Socioeconomic History     Marital status:      Spouse name: Not on file     Number of children: Not on file     Years of education: Not on file     Highest education level: Not on file   Occupational History     Not on file   Social Needs     Financial resource strain: Not on file     Food insecurity     Worry: Not on file     Inability: Not on file     Transportation needs     Medical: Not on file     Non-medical: Not on file   Tobacco Use     Smoking status: Former Smoker     Types: Cigarettes, Cigars     Last attempt to quit: 1968     Years since quittin.2     Smokeless tobacco: Never Used   Substance and Sexual Activity     Alcohol use: Yes     Alcohol/week: 0.0 standard drinks     Comment: 2-3 drinks twice weekly     Drug use: No     Sexual activity: Not on file   Lifestyle     Physical activity     Days per week: Not on file     Minutes per session: Not on file     Stress: Not on file   Relationships     Social connections     Talks on phone: Not on file     Gets together: Not on file     Attends Judaism service: Not on file     Active member of club or organization: Not on file     Attends meetings of clubs or organizations: Not on file     Relationship status: Not on file     Intimate partner violence     Fear of current or ex partner: Not on file     Emotionally abused: Not on file     Physically abused: Not on file     Forced sexual activity: Not on file   Other Topics Concern     Parent/sibling w/ CABG, MI or angioplasty before 65F 55M? Not  "Asked   Social History Narrative     Not on file     Review of Systems - History obtained from chart review and the patient  General ROS: negative for - chills or fever  Respiratory ROS: no cough, shortness of breath, or wheezing  Cardiovascular ROS: no chest pain or dyspnea on exertion  Musculoskeletal ROS: positive for - muscular weakness  Neurological ROS: positive for - Falls, foot drop  Dermatological ROS: negative for rash  Functional Hx:  As per HPI    Physical Exam:  /65 (BP Location: Left arm, Patient Position: Chair, Cuff Size: Adult Regular)   Pulse 56   Ht 1.702 m (5' 7\")   Wt 72.2 kg (159 lb 2.8 oz)   BMI 24.93 kg/m      Gen: NAD  Skin: No rashes noted  HEENT: NC/AT  Pulm: Non-labored breathing  GI: Soft, NT/ND  Ext: 1+ edema, stasis color changes, no calf tenderness  Neuro: Follows commands, answers appropriately  MMT 5/5 RLE  LLE [id number]  Gait: Forward posture, step through, good foot clearance    Assessment:  Noe Florence is an 85 y/o M who is referred to the PM&R clinic today for evaluation of decreased balance and falls.    Recommendations:  -Mr. Florence has had falls in the past year, two of which lead to serious injuries, however these seem to have been mechanical in nature due to slipping on ice.  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  -He has already removed tripping hazards in the home.  Per discharge summary from ARU there was concern for significant clutter/hoarding tendencies.  I did not ask him about this today, but if this is still the case then clearing this out will also decrease his falls risk.  -Encourage stretching and maintaining an adequate home exercise program.    -Ossur foot up brace has been working well for him so he should continue to wear this.  Another potential option could be a Turbomed brace which attaches to the outside of the shoe.  -Will place " new PT and OT referrals to work on balance, community level ambulation, optimize posture and gait, and ADLs (I.e., LBD).  -May return to clinic on an as needed basis only  Montez Whipple MD  Department of Rehabilitation Medicine  Pager: [phone]  Time Spent on this Encounter   I, Montez Whipple, spent a total of 45 minutes face-to-face or managing the care of Noe Florence. Over 50% of my time was spent counseling the patient and coordinating care. See note for details.       Again, thank you for allowing me to participate in the care of your patient.      Sincerely,    Peterson Whipple MD

## 2020-03-17 PROBLEM — M62.89 PELVIC FLOOR DYSFUNCTION: Status: RESOLVED | Noted: 2019-07-19 | Resolved: 2020-03-17

## 2020-03-17 NOTE — PROGRESS NOTES
Discharge Note    Progress reporting period is from initial evaluation date (please see noted date below) to Feb 11, 2020.  Linked Episodes   Type: Episode: Status: Noted: Resolved: Last update: Updated by:   PHYSICAL THERAPY pelvic floor dysfunction 12/12/19 Active 12/12/2019 2/11/2020 10:09 AM Bell Bennett, PT      Comments:       Noe failed to follow up and current status is unknown.  Please see information below for last relevant information on current status.  Patient seen for 6 visits.    SUBJECTIVE  Subjective changes noted by patient:  The patient notes that he is leaking urine at night without waking up with an urge. He notes that he has been sweating more at night.  .  Current pain level is  .     Previous pain level was  1/10.   Changes in function:  Yes (See Goal flowsheet attached for changes in current functional level)  Adverse reaction to treatment or activity: None    OBJECTIVE  Changes noted in objective findings: pad usage in 24 hours: 3 depends, 4 pads. BM: 2 a day     ASSESSMENT/PLAN  Diagnosis: pelvic floor dysfunction   Updated problem list and treatment plan:   Decreased function - HEP  Impaired muscle performance - HEP  Impaired posture - HEP  STG/LTGs have been met or progress has been made towards goals:  Yes, please see goal flowsheet for most current information  Assessment of Progress: current status is unknown.    Last current status: Pt is progressing as expected(pt and wife unsure of pad usage extended STM, + posture goal)   Self Management Plans:  HEP  I have re-evaluated this patient and find that the nature, scope, duration and intensity of the therapy is appropriate for the medical condition of the patient.  Noe continues to require the following intervention to meet STG and LTG's:  HEP.    Recommendations:  Discharge with current home program.  Patient to follow up with MD as needed.    Please refer to the daily flowsheet for treatment today, total treatment  time and time spent performing 1:1 timed codes.

## 2020-03-19 ENCOUNTER — ANTICOAGULATION THERAPY VISIT (OUTPATIENT)
Dept: ANTICOAGULATION | Facility: CLINIC | Age: 85
End: 2020-03-19

## 2020-03-19 DIAGNOSIS — I48.91 ATRIAL FIBRILLATION, UNSPECIFIED TYPE (H): ICD-10-CM

## 2020-03-19 DIAGNOSIS — Z79.01 LONG TERM CURRENT USE OF ANTICOAGULANT THERAPY: ICD-10-CM

## 2020-03-19 DIAGNOSIS — I82.409 DEEP VEIN THROMBOSIS (DVT) (H): ICD-10-CM

## 2020-03-19 NOTE — PROGRESS NOTES
20 Received voicemail from Selin at [facility] [place] on  at 4:50PM.  Patient INR referral and standing lab order have .  Per message, Sha was drawn.  This writer has been unable to see results at time of encounter.  Sent orders to Dr. Desai for electronic signature.  Ciro Bajwa RN          3/19/20 Spoke to Nicolette.  Sha will be coming to the clinic for an appt on 20.  Reviewed with Pharmacist Jeremy Ho [facility].  With viral threat, it is approved that with recent maintenance dose changes and INR result of 1.47, that he should avoid the clinic.  Updated calendar.    Ciro Bajwa RN    Addendum 20 Friend Nicolette reports pt will come in for an INR on . Nguyen Zuniga RN

## 2020-03-26 DIAGNOSIS — I47.20 VENTRICULAR TACHYARRHYTHMIA (H): Primary | ICD-10-CM

## 2020-04-06 ENCOUNTER — TELEPHONE (OUTPATIENT)
Dept: CARDIOLOGY | Facility: CLINIC | Age: 85
End: 2020-04-06

## 2020-04-06 ENCOUNTER — VIRTUAL VISIT (OUTPATIENT)
Dept: CARDIOLOGY | Facility: CLINIC | Age: 85
End: 2020-04-06
Attending: INTERNAL MEDICINE
Payer: COMMERCIAL

## 2020-04-06 ENCOUNTER — PRE VISIT (OUTPATIENT)
Dept: CARDIOLOGY | Facility: CLINIC | Age: 85
End: 2020-04-06

## 2020-04-06 DIAGNOSIS — I48.0 PAROXYSMAL ATRIAL FIBRILLATION (H): Primary | ICD-10-CM

## 2020-04-06 DIAGNOSIS — Z95.810 ICD (IMPLANTABLE CARDIOVERTER-DEFIBRILLATOR) IN PLACE: ICD-10-CM

## 2020-04-06 PROCEDURE — 99214 OFFICE O/P EST MOD 30 MIN: CPT | Mod: GT | Performed by: INTERNAL MEDICINE

## 2020-04-06 NOTE — PROGRESS NOTES
"Noe Florence is a 84 year old male who is being evaluated via a billable video visit.      The patient has been notified of following:     \"This video visit will be conducted via a call between you and your physician/provider. We have found that certain health care needs can be provided without the need for an in-person physical exam.  This service lets us provide the care you need with a video conversation.  If a prescription is necessary we can send it directly to your pharmacy.  If lab work is needed we can place an order for that and you can then stop by our lab to have the test done at a later time.    If during the course of the call the physician/provider feels a video visit is not appropriate, you will not be charged for this service.\"     Patient has given verbal consent for Video visit? Yes    Patient would like the video invitation sent by: Send to e-mail at: [email or web address]    Video Start Time: 1:41 PM    Noe Florence complains of    Chief Complaint   Patient presents with     Follow Up     6 month return        I have reviewed and updated the patient's Past Medical History, Social History, Family History and Medication List.    ALLERGIES  Latex    Additional provider notes:    HPI: Follow-up for atrial fibrillation    The patient has a past medical history significant for  HTN, HLD, CKD3, CAD s/p CABG x2, DESx2, polymorphic VT s/p ICD placement (5/2012, gen change 12/2018), and AF/AFL s/p CTI (6/2014) and right atrial appendage atrial tachycardia ablation (9/2014). His ICD reached REBECCA and he underwent an ICD gen change on 12/11/2018.    Patient's last visit with me was in October 2019.  At that time because of a recent DVT, warfarin was restarted.  In the last 6 months, patient did not encounter any problems with the use of warfarin.  Specifically, patient did not have any bleeding problems or any fall episodes.    Patient denies any symptoms of frequent palpitations, exertional dyspnea, " exertional angina, frequent lightheadedness, presyncope or syncope.         PAST MEDICAL HISTORY:  Past Medical History:   Diagnosis Date     Advanced open-angle glaucoma      Atrial fibrillation (H)      CKD (chronic kidney disease), stage III (H) 2005     Colon cancer (H)     Stage II-B colon cancer     Coronary artery disease     s/p CABG x 2, JEREMY x 2     Diverticulitis      Hyperlipidemia      Hypertension      Ischemic cardiomyopathy      MGUS (monoclonal gammopathy of unknown significance)      Nonsenile cataract     BE     Osteoporosis     left hip fracture     Polymorphic ventricular tachycardia (H)      Polymyalgia rheumatica (H)      PVD (posterior vitreous detachment), both eyes 2005     S/P ablation of atrial flutter 6/20/14    CTI     Stented coronary artery      SVT (supraventricular tachycardia) (H)     PPM/AICD for NSVT     Upper leg DVT (deep venous thromboembolism), chronic (H)     Left     Weight loss, unintentional 2017    15 lb in 4 months       CURRENT MEDICATIONS:  Current Outpatient Medications   Medication Sig Dispense Refill     alendronate (FOSAMAX) 70 MG tablet Take 1 tablet (70 mg) by mouth every 7 days Take with a full glass of water and do not eat or lay down for 30 minutes 12 tablet 3     ARIPiprazole (ABILIFY) 2 MG tablet Take 2 mg by mouth daily       aspirin (ASA) 81 MG tablet Take 1 tablet (81 mg) by mouth daily 90 tablet 3     atorvastatin (LIPITOR) 40 MG tablet Take 1 tablet (40 mg) by mouth daily 90 tablet 3     calcium carbonate 500 mg, elemental, (OSCAL;OYSTER SHELL CALCIUM) 500 MG tablet Take 1 tablet (500 mg) by mouth 2 times daily 180 tablet 3     cholecalciferol 1000 units TABS Take 1,000 Units by mouth daily        COMPRESSION STOCKINGS 1 each daily 1 each 4     Cyanocobalamin (VITAMIN B-12 CR PO)        ferrous sulfate (FE TABS) 325 (65 Fe) MG EC tablet Take 325 mg by mouth daily       metoprolol tartrate (LOPRESSOR) 25 MG tablet Take 12.5 mg by mouth 2 times daily  180  "tablet 3     mirtazapine (REMERON) 15 MG tablet Take 15 mg by mouth At Bedtime.       Multiple Vitamins-Minerals (MULTIVITAMIN ADULT PO)        sertraline (ZOLOFT) 100 MG tablet Take 150 mg by mouth every evening        tamsulosin (FLOMAX) 0.4 MG capsule Take 2 capsules (0.8 mg) by mouth daily 180 capsule 3     warfarin (COUMADIN) 5 MG tablet Take 1 tablet (5 mg) by mouth daily Or as directed by Coumadin Clinic 60 tablet 0     warfarin ANTICOAGULANT (COUMADIN) 5 MG tablet Take 1 tablet (5 mg) by mouth daily 90 tablet 1     polyethylene glycol (MIRALAX) packet Take 238 g by mouth See Admin Instructions Start at 4 pm night prior to colonoscopy. Refer to \"Getting Ready for Colonoscopy\" instructions. (Patient not taking: Reported on 4/6/2020) 238 g 0       PAST SURGICAL HISTORY:  Past Surgical History:   Procedure Laterality Date     C CABG, ARTERY-VEIN, FOUR  2006    LIMA-LAD, SVG-Rt PDA, SVG-OM2, SVG-Diag 1     C CABG, VEIN, SINGLE  1994    1-vessel CABG, SVG->PDRCA      CATARACT IOL, RT/LT Bilateral      COLONOSCOPY  3/13/2014    Procedure: COMBINED COLONOSCOPY, SINGLE BIOPSY/POLYPECTOMY BY BIOPSY;;  Surgeon: Mary Gerber MD;  Location:  GI     COLONOSCOPY N/A 6/22/2015    Procedure: COLONOSCOPY;  Surgeon: Marilin Newman MD;  Location:  GI     COLONOSCOPY N/A 11/7/2018    Procedure: COMBINED COLONOSCOPY, SINGLE OR MULTIPLE BIOPSY/POLYPECTOMY BY BIOPSY;  Surgeon: Roberto Esteban MD;  Location:  GI     EP ICD GENERATOR CHANGE DUAL N/A 12/11/2018    Procedure: EP ICD Generator Change Dual;  Surgeon: Deedee Baird MD;  Location:  HEART CARDIAC CATH LAB     H ABLATION ATRIAL FLUTTER       HEART CATH DRUG ELUTING STENT PLACEMENT  4/19/2012    JEREMY x 2 to LCx     IMPLANT IMPLANTABLE CARDIOVERTER DEFIBRILLATOR  5-    ppm/aicd     KNEE SURGERY      right and left knee surgeries     LAPAROSCOPIC ASSISTED COLECTOMY Right 2/1/2019    Procedure: Laparoscopic Converted to Open Transverse " Colectomy with Lysis of Adhesions;  Surgeon: Chanelle Guzmán MD;  Location: [facility] OR     LAPAROSCOPIC ASSISTED COLECTOMY LEFT (DESCENDING)  2014    Procedure: LAPAROSCOPIC ASSISTED COLECTOMY LEFT (DESCENDING);  Hand assisted Laparoscopic Sigmoid Colectomy , Laparoscopic mobilization of spleenic flexure *Latex Allergy*Anesthesia General with Block;  Surgeon: Chanelle Guzmán MD;  Location: [facility] OR     OPEN REDUCTION INTERNAL FIXATION RODDING INTRAMEDULLAR FEMUR FRACTURE TABLE Left 3/14/2019    Procedure: Open Reduction Internal Fixation Left Femur Intramedullar Nailing;  Surgeon: Srikanth Avalos MD;  Location: [facility] OR     REPAIR VALVE MITRAL  2006     30-mm Medtronic Julian ring      SELECTIVE LASER TRABECULOPLASTY (SLT) OD (RIGHT EYE)  4/10, ,+13    RE     SELECTIVE LASER TRABECULOPLASTY (SLT) OS (LEFT EYE)  2012     SHOULDER SURGERY      right rotator cuff       ALLERGIES:     Allergies   Allergen Reactions     Latex Rash     Rash       FAMILY HISTORY:  - Premature coronary artery disease  - Atrial fibrillation  - Sudden cardiac death     SOCIAL HISTORY:  Social History     Tobacco Use     Smoking status: Former Smoker     Types: Cigarettes, Cigars     Last attempt to quit: 1968     Years since quittin.2     Smokeless tobacco: Never Used   Substance Use Topics     Alcohol use: Yes     Alcohol/week: 0.0 standard drinks     Comment: 2-3 drinks twice weekly     Drug use: No       ROS:   Constitutional: No fever, chills, or sweats. Weight stable.   ENT: No visual disturbance, ear ache, epistaxis, sore throat.   Cardiovascular: As per HPI.   Respiratory: No cough, hemoptysis.    GI: No nausea, vomiting, hematemesis, melena, or hematochezia.   : No hematuria.   Integument: Negative.   Psychiatric: Negative.   Hematologic:  Easy bruising, no easy bleeding.  Neuro: Negative.   Endocrinology: No significant heat or cold intolerance   Musculoskeletal: No  myalgia.    Exam:  There were no vitals taken for this visit.  GENERAL APPEARANCE: healthy, alert and no distress  HEENT: no icterus, no xanthelasmas, normal pupil size and reaction, normal palate, mucosa moist, no central cyanosis  NECK: no adenopathy, no asymmetry, masses, or scars, thyroid normal to palpation and no bruits, JVP not elevated  RESPIRATORY: lungs clear to auscultation - no rales, rhonchi or wheezes, no use of accessory muscles, no retractions, respirations are unlabored, normal respiratory rate  CARDIOVASCULAR: regular rhythm, normal S1 with physiologic split S2, no S3 or S4 and no murmur, click or rub, precordium quiet with normal PMI.  ABDOMEN: soft, non tender, without hepatosplenomegaly, no masses palpable, bowel sounds normal, aorta not enlarged by palpation, no abdominal bruits  EXTREMITIES: peripheral pulses normal, no edema, no bruits  NEURO: alert and oriented to person/place/time, normal speech, gait and affect  VASC: Radial, femoral, dorsalis pedis and posterior tibialis pulses are normal in volumes and symmetric bilaterally. No bruits are heard.  SKIN: no ecchymoses, no rashes    Labs:  CBC RESULTS:   Lab Results   Component Value Date    WBC 7.4 02/21/2020    RBC 3.74 (L) 02/21/2020    HGB 11.3 (L) 02/21/2020    HCT 36.4 (L) 02/21/2020    MCV 97 02/21/2020    MCH 30.2 02/21/2020    MCHC 31.0 (L) 02/21/2020    RDW 13.6 02/21/2020     02/21/2020       BMP RESULTS:  Lab Results   Component Value Date     02/21/2020    POTASSIUM 4.7 02/21/2020    CHLORIDE 106 02/21/2020    CO2 28 02/21/2020    ANIONGAP 4 02/21/2020    GLC 52 (L) 02/21/2020    BUN 46 (H) 02/21/2020    CR 1.34 (H) 02/21/2020    GFRESTIMATED 48 (L) 02/21/2020    GFRESTBLACK 56 (L) 02/21/2020    KEITH 8.9 02/21/2020        INR RESULTS:  Lab Results   Component Value Date    INR 1.47 (H) 03/05/2020    INR 1.47 (H) 02/21/2020    INR 1.83 (H) 01/23/2020    INR 1.95 (H) 01/02/2020       Procedures:      Assessment and  Plan:     Paroxysmal atrial fibrillation  Frequent falls  Status post ICD    It is encouraging the patient has been doing well without any problems with the use of warfarin.  His atrial fibrillation is also well controlled.     I have instructed patient to discontinue aspirin to reduce the risk of bleeding.    We will see patient again in the heart rhythm clinic in approximately 6 months.    All questions and concerns were addressed and patient is happy with plan.      CC  Patient Care Team:  Roberto Sarmiento MD as PCP - General (Internal Medicine)  Francisco Gilliam MD as MD (Oncology)  Omero Srinivasan MD as MD (Ophthalmology)  Hay Arnett MD as MD (Internal Medicine)  NIKHIL Wheeler MD as MD (Dermatology)  Cynthia Carrion MD as MD (Cardiology)  Bhavana Mckeon MD as MD (Internal Medicine)  Naveed Ram MD as Resident  Guzman Boudreaux DPM (Podiatry)  Joshua Centeno MD as MD (Family Practice)  Brett Petersen MD as MD (Dermapathology)  Clarice Magallanes APRN CNP as Nurse Practitioner (Nurse Practitioner - Family)  Lashawn Jackson MD as MD (Dermatology)  Marci Palmer, BYRON as Nurse Coordinator (Oncology)  Lashawn Cool, BYRON as Specialty Care Coordinator (Cardiology)  Jaye Alexander APRN CNP as Nurse Practitioner (Nurse Practitioner - Family)  Alona Lowe CNP as Nurse Practitioner (Nurse Practitioner)  Nusrat Mayo, BYRON as Specialty Care Coordinator (Urology)  Octaviano Santos MD as MD (Internal Medicine - Interventional Cardiology)  Myesha Arreola APRN CNP as Assigned PCP  CYNTHIA CARRION        Video-Visit Details    Type of service:  Video Visit    Video End Time (time video stopped): 1:49 PM    Originating Location (pt. Location): Home    Distant Location (provider location):  [facility]     Mode of Communication:  Video Conference via [employer]

## 2020-04-06 NOTE — PATIENT INSTRUCTIONS
You were seen via video visit in Electrophysiology today by: Dr. ZEB Baird    Plan:     Medication Changes:     Stop aspirin    Follow up visit: 6 months with device check       Your Care Team:  EP Cardiology   Telephone Number     Lashawn Cool RN [phone]     For scheduling appts or procedures:    Ifeoma Osuna   [phone]   For the Device Clinic (Pacemakers, ICDs, Loop Recorders)    During business hours: [phone]  After business hours:   [phone]- select option 4 and ask for job code [phone].       Cardiovascular Clinic:   [address] [place] [address]. [place] [address]      As always, Thank you for trusting us with your health care needs!

## 2020-04-06 NOTE — TELEPHONE ENCOUNTER
[facility] Call Center    Phone Message    May a detailed message be left on voicemail: no     Reason for Call: Other: Pt's LANIE Strauss called to request the info for completing the video visit scheduled later today at 1:30pm. Please either send it to the email on file or through to the Pt's GITRhart. Please call for any issues.     Action Taken: Message routed to:  [facility]    Travel Screening: Not Applicable

## 2020-04-08 ENCOUNTER — ANCILLARY PROCEDURE (OUTPATIENT)
Dept: CARDIOLOGY | Facility: CLINIC | Age: 85
End: 2020-04-08
Attending: INTERNAL MEDICINE
Payer: COMMERCIAL

## 2020-04-08 DIAGNOSIS — I47.20 VENTRICULAR TACHYARRHYTHMIA (H): ICD-10-CM

## 2020-04-08 PROCEDURE — 93296 REM INTERROG EVL PM/IDS: CPT | Mod: ZF

## 2020-04-08 PROCEDURE — 93295 DEV INTERROG REMOTE 1/2/MLT: CPT | Performed by: INTERNAL MEDICINE

## 2020-04-09 NOTE — PROGRESS NOTES
Addendum 4/9/20 Friend Rica called reporting that they do not want to go in right now for an INR and was wondering if we could extend the date. Writer extended the date until the end of April. Writer also let Rica know about a home monitoring machine, but Rica doesn't think that pt will be interested in this. Updated the calendar with new return date. Nguyen Zuniga RN

## 2020-04-11 LAB
MDC_IDC_LEAD_IMPLANT_DT: NORMAL
MDC_IDC_LEAD_IMPLANT_DT: NORMAL
MDC_IDC_LEAD_LOCATION: NORMAL
MDC_IDC_LEAD_LOCATION: NORMAL
MDC_IDC_LEAD_LOCATION_DETAIL_1: NORMAL
MDC_IDC_LEAD_LOCATION_DETAIL_1: NORMAL
MDC_IDC_LEAD_MFG: NORMAL
MDC_IDC_LEAD_MFG: NORMAL
MDC_IDC_LEAD_MODEL: NORMAL
MDC_IDC_LEAD_MODEL: NORMAL
MDC_IDC_LEAD_POLARITY_TYPE: NORMAL
MDC_IDC_LEAD_POLARITY_TYPE: NORMAL
MDC_IDC_LEAD_SERIAL: NORMAL
MDC_IDC_LEAD_SERIAL: NORMAL
MDC_IDC_MSMT_BATTERY_DTM: NORMAL
MDC_IDC_MSMT_BATTERY_REMAINING_LONGEVITY: 107 MO
MDC_IDC_MSMT_BATTERY_RRT_TRIGGER: 2.73
MDC_IDC_MSMT_BATTERY_STATUS: NORMAL
MDC_IDC_MSMT_BATTERY_VOLTAGE: 3 V
MDC_IDC_MSMT_CAP_CHARGE_DTM: NORMAL
MDC_IDC_MSMT_CAP_CHARGE_ENERGY: 18 J
MDC_IDC_MSMT_CAP_CHARGE_TIME: 3.87
MDC_IDC_MSMT_CAP_CHARGE_TYPE: NORMAL
MDC_IDC_MSMT_LEADCHNL_RA_IMPEDANCE_VALUE: 456 OHM
MDC_IDC_MSMT_LEADCHNL_RA_PACING_THRESHOLD_AMPLITUDE: 0.62 V
MDC_IDC_MSMT_LEADCHNL_RA_PACING_THRESHOLD_PULSEWIDTH: 0.4 MS
MDC_IDC_MSMT_LEADCHNL_RA_SENSING_INTR_AMPL: 0.62 MV
MDC_IDC_MSMT_LEADCHNL_RA_SENSING_INTR_AMPL: 0.62 MV
MDC_IDC_MSMT_LEADCHNL_RV_IMPEDANCE_VALUE: 342 OHM
MDC_IDC_MSMT_LEADCHNL_RV_IMPEDANCE_VALUE: 399 OHM
MDC_IDC_MSMT_LEADCHNL_RV_PACING_THRESHOLD_AMPLITUDE: 0.62 V
MDC_IDC_MSMT_LEADCHNL_RV_PACING_THRESHOLD_PULSEWIDTH: 0.4 MS
MDC_IDC_MSMT_LEADCHNL_RV_SENSING_INTR_AMPL: 7.88 MV
MDC_IDC_MSMT_LEADCHNL_RV_SENSING_INTR_AMPL: 7.88 MV
MDC_IDC_PG_IMPLANT_DTM: NORMAL
MDC_IDC_PG_MFG: NORMAL
MDC_IDC_PG_MODEL: NORMAL
MDC_IDC_PG_SERIAL: NORMAL
MDC_IDC_PG_TYPE: NORMAL
MDC_IDC_SESS_CLINIC_NAME: NORMAL
MDC_IDC_SESS_DTM: NORMAL
MDC_IDC_SESS_TYPE: NORMAL
MDC_IDC_SET_BRADY_AT_MODE_SWITCH_RATE: 171 {BEATS}/MIN
MDC_IDC_SET_BRADY_HYSTRATE: NORMAL
MDC_IDC_SET_BRADY_LOWRATE: 60 {BEATS}/MIN
MDC_IDC_SET_BRADY_MAX_SENSOR_RATE: 130 {BEATS}/MIN
MDC_IDC_SET_BRADY_MAX_TRACKING_RATE: 130 {BEATS}/MIN
MDC_IDC_SET_BRADY_MODE: NORMAL
MDC_IDC_SET_BRADY_PAV_DELAY_LOW: 180 MS
MDC_IDC_SET_BRADY_SAV_DELAY_LOW: 150 MS
MDC_IDC_SET_LEADCHNL_RA_PACING_AMPLITUDE: 1.5 V
MDC_IDC_SET_LEADCHNL_RA_PACING_ANODE_ELECTRODE_1: NORMAL
MDC_IDC_SET_LEADCHNL_RA_PACING_ANODE_LOCATION_1: NORMAL
MDC_IDC_SET_LEADCHNL_RA_PACING_CAPTURE_MODE: NORMAL
MDC_IDC_SET_LEADCHNL_RA_PACING_CATHODE_ELECTRODE_1: NORMAL
MDC_IDC_SET_LEADCHNL_RA_PACING_CATHODE_LOCATION_1: NORMAL
MDC_IDC_SET_LEADCHNL_RA_PACING_POLARITY: NORMAL
MDC_IDC_SET_LEADCHNL_RA_PACING_PULSEWIDTH: 0.4 MS
MDC_IDC_SET_LEADCHNL_RA_SENSING_ANODE_ELECTRODE_1: NORMAL
MDC_IDC_SET_LEADCHNL_RA_SENSING_ANODE_LOCATION_1: NORMAL
MDC_IDC_SET_LEADCHNL_RA_SENSING_CATHODE_ELECTRODE_1: NORMAL
MDC_IDC_SET_LEADCHNL_RA_SENSING_CATHODE_LOCATION_1: NORMAL
MDC_IDC_SET_LEADCHNL_RA_SENSING_POLARITY: NORMAL
MDC_IDC_SET_LEADCHNL_RA_SENSING_SENSITIVITY: 0.3 MV
MDC_IDC_SET_LEADCHNL_RV_PACING_AMPLITUDE: 2 V
MDC_IDC_SET_LEADCHNL_RV_PACING_ANODE_ELECTRODE_1: NORMAL
MDC_IDC_SET_LEADCHNL_RV_PACING_ANODE_LOCATION_1: NORMAL
MDC_IDC_SET_LEADCHNL_RV_PACING_CAPTURE_MODE: NORMAL
MDC_IDC_SET_LEADCHNL_RV_PACING_CATHODE_ELECTRODE_1: NORMAL
MDC_IDC_SET_LEADCHNL_RV_PACING_CATHODE_LOCATION_1: NORMAL
MDC_IDC_SET_LEADCHNL_RV_PACING_POLARITY: NORMAL
MDC_IDC_SET_LEADCHNL_RV_PACING_PULSEWIDTH: 0.4 MS
MDC_IDC_SET_LEADCHNL_RV_SENSING_ANODE_ELECTRODE_1: NORMAL
MDC_IDC_SET_LEADCHNL_RV_SENSING_ANODE_LOCATION_1: NORMAL
MDC_IDC_SET_LEADCHNL_RV_SENSING_CATHODE_ELECTRODE_1: NORMAL
MDC_IDC_SET_LEADCHNL_RV_SENSING_CATHODE_LOCATION_1: NORMAL
MDC_IDC_SET_LEADCHNL_RV_SENSING_POLARITY: NORMAL
MDC_IDC_SET_LEADCHNL_RV_SENSING_SENSITIVITY: 0.45 MV
MDC_IDC_SET_ZONE_DETECTION_BEATS_DENOMINATOR: 24 {BEATS}
MDC_IDC_SET_ZONE_DETECTION_BEATS_NUMERATOR: 18 {BEATS}
MDC_IDC_SET_ZONE_DETECTION_INTERVAL: 300 MS
MDC_IDC_SET_ZONE_DETECTION_INTERVAL: 320 MS
MDC_IDC_SET_ZONE_DETECTION_INTERVAL: 350 MS
MDC_IDC_SET_ZONE_DETECTION_INTERVAL: 430 MS
MDC_IDC_SET_ZONE_DETECTION_INTERVAL: NORMAL
MDC_IDC_SET_ZONE_TYPE: NORMAL
MDC_IDC_STAT_AT_BURDEN_PERCENT: 0 %
MDC_IDC_STAT_AT_DTM_END: NORMAL
MDC_IDC_STAT_AT_DTM_START: NORMAL
MDC_IDC_STAT_BRADY_AP_VP_PERCENT: 16.85 %
MDC_IDC_STAT_BRADY_AP_VS_PERCENT: 51.73 %
MDC_IDC_STAT_BRADY_AS_VP_PERCENT: 2.97 %
MDC_IDC_STAT_BRADY_AS_VS_PERCENT: 28.44 %
MDC_IDC_STAT_BRADY_DTM_END: NORMAL
MDC_IDC_STAT_BRADY_DTM_START: NORMAL
MDC_IDC_STAT_BRADY_RA_PERCENT_PACED: 65.44 %
MDC_IDC_STAT_BRADY_RV_PERCENT_PACED: 20.37 %
MDC_IDC_STAT_EPISODE_RECENT_COUNT: 0
MDC_IDC_STAT_EPISODE_RECENT_COUNT_DTM_END: NORMAL
MDC_IDC_STAT_EPISODE_RECENT_COUNT_DTM_START: NORMAL
MDC_IDC_STAT_EPISODE_TOTAL_COUNT: 0
MDC_IDC_STAT_EPISODE_TOTAL_COUNT: 4
MDC_IDC_STAT_EPISODE_TOTAL_COUNT: 59
MDC_IDC_STAT_EPISODE_TOTAL_COUNT_DTM_END: NORMAL
MDC_IDC_STAT_EPISODE_TOTAL_COUNT_DTM_START: NORMAL
MDC_IDC_STAT_EPISODE_TYPE: NORMAL
MDC_IDC_STAT_TACHYTHERAPY_ATP_DELIVERED_RECENT: 0
MDC_IDC_STAT_TACHYTHERAPY_ATP_DELIVERED_TOTAL: 0
MDC_IDC_STAT_TACHYTHERAPY_RECENT_DTM_END: NORMAL
MDC_IDC_STAT_TACHYTHERAPY_RECENT_DTM_START: NORMAL
MDC_IDC_STAT_TACHYTHERAPY_SHOCKS_ABORTED_RECENT: 0
MDC_IDC_STAT_TACHYTHERAPY_SHOCKS_ABORTED_TOTAL: 0
MDC_IDC_STAT_TACHYTHERAPY_SHOCKS_DELIVERED_RECENT: 0
MDC_IDC_STAT_TACHYTHERAPY_SHOCKS_DELIVERED_TOTAL: 0
MDC_IDC_STAT_TACHYTHERAPY_TOTAL_DTM_END: NORMAL
MDC_IDC_STAT_TACHYTHERAPY_TOTAL_DTM_START: NORMAL

## 2020-04-30 DIAGNOSIS — I82.409 DEEP VEIN THROMBOSIS (DVT) (H): ICD-10-CM

## 2020-04-30 DIAGNOSIS — I48.91 ATRIAL FIBRILLATION, UNSPECIFIED TYPE (H): ICD-10-CM

## 2020-04-30 DIAGNOSIS — Z79.01 LONG TERM CURRENT USE OF ANTICOAGULANT THERAPY: ICD-10-CM

## 2020-05-01 ENCOUNTER — ANTICOAGULATION THERAPY VISIT (OUTPATIENT)
Dept: ANTICOAGULATION | Facility: CLINIC | Age: 85
End: 2020-05-01

## 2020-05-01 DIAGNOSIS — I82.409 DEEP VEIN THROMBOSIS (DVT) (H): ICD-10-CM

## 2020-05-01 DIAGNOSIS — I48.91 ATRIAL FIBRILLATION, UNSPECIFIED TYPE (H): ICD-10-CM

## 2020-05-01 DIAGNOSIS — Z79.01 LONG TERM CURRENT USE OF ANTICOAGULANT THERAPY: ICD-10-CM

## 2020-05-01 LAB — INR PPP: 2.03 (ref 0.86–1.14)

## 2020-05-01 NOTE — PROGRESS NOTES
ANTICOAGULATION FOLLOW-UP CLINIC VISIT    Patient Name:  Noe Florence  Date:  2020  Contact Type:  Telephone    SUBJECTIVE:  Patient Findings     Comments:   Spoke to Rica. No Problems found. No Changes in Health, Medications, or diet. No Signs of bruising, bleeding or clotting.        Clinical Outcomes     Comments:   Spoke to Rica. No Problems found. No Changes in Health, Medications, or diet. No Signs of bruising, bleeding or clotting.           OBJECTIVE    INR   Date Value Ref Range Status   2020 2.03 (H) 0.86 - 1.14 Final       ASSESSMENT / PLAN  INR assessment THER    Recheck INR In: 4 WEEKS    INR Location Clinic      Anticoagulation Summary  As of 2020    INR goal:   1.5-2.5   TTR:   56.3 % (1 y)   INR used for dosin.03 (2020)   Warfarin maintenance plan:   7.5 mg (5 mg x 1.5) every Thu; 5 mg (5 mg x 1) all other days   Full warfarin instructions:   7.5 mg every Thu; 5 mg all other days   Weekly warfarin total:   37.5 mg   No change documented:   Ciro Sharp RN   Plan last modified:   Sary Parisi, BYRON (3/5/2020)   Next INR check:   2020   Priority:   High   Target end date:   Indefinite    Indications    Atrial fibrillation (H) [I48.91] [I48.91]  Long-term (current) use of anticoagulants [Z79.01] [Z79.01]  Deep vein thrombosis (DVT) (H) [I82.409]  Atrial fibrillation  unspecified type (H) [I48.91]             Anticoagulation Episode Summary     INR check location:       Preferred lab:       Send INR reminders to:   [facility]    Comments:   Patient contact number: [phone] Hx of Falls/Bleed  Can leave results with Rica (friend)  Restarted Warfarin due to DVT.       Anticoagulation Care Providers     Provider Role Specialty Phone number    Frida Desai MD Referring Cardiology [phone]            See the Encounter Report to view Anticoagulation Flowsheet and Dosing Calendar (Go to Encounters tab in chart review, and find the  Anticoagulation Therapy Visit)    INR/CFX/F2 RESULT: INR result is 2.03    ASSESSMENT:No Problems found. No Changes in Health, Medications, or diet. No Signs of bruising, bleeding or clotting.    DOSING ADJUSTMENT: continue maintenance dose    NEXT INR/FACTOR X OR FACTOR II:5/29, Made appt at [facility] lab    PROTOCOL FOLLOWED:goal 1.5-2.5    Sent orders to Dr. Desai on 5/1.    Ciro Sharp RN

## 2020-05-18 ENCOUNTER — VIRTUAL VISIT (OUTPATIENT)
Dept: INTERNAL MEDICINE | Facility: CLINIC | Age: 85
End: 2020-05-18
Payer: COMMERCIAL

## 2020-05-18 DIAGNOSIS — I48.91 ATRIAL FIBRILLATION, UNSPECIFIED TYPE (H): ICD-10-CM

## 2020-05-18 DIAGNOSIS — I25.10 CORONARY ARTERY DISEASE INVOLVING NATIVE CORONARY ARTERY OF NATIVE HEART, ANGINA PRESENCE UNSPECIFIED: Primary | ICD-10-CM

## 2020-05-18 ASSESSMENT — PAIN SCALES - GENERAL: PAINLEVEL: NO PAIN (0)

## 2020-05-18 NOTE — PROGRESS NOTES
THis is an 85 yo with history of:  1 paroxysmal atrial fibrillation on anticoagulation  2 osteoporosis on Fosamax  3 history of DVT off anticoagulation  4 hypertension well controlled  5 hyperlipidemia  6 renal insufficiency  7 anemia secondary to above  8 urinary incontinence  9 peripheral arterial disease  10 monoclonal gammopathy stable  11 Hip fracture with nailing 3/14/2019  12 CAD S/P CABGx2 with JEREMY  13 VT S/P ICD  14 Colon cancer resected  Phone visit today for F/U overall feels better with improved balance and no falls.  No issues with meds and urine control is much improved.  No bleeding or issues related to coumadin or heart rhythm.  Will plan to do F/U in clinic this fall  (14 minutes)  Roberto Sarmiento MD

## 2020-05-18 NOTE — NURSING NOTE
Chief Complaint   Patient presents with     Recheck Medication     pt would like to follow up, was originally a physical       Rica Sanford CMA, EMT at 2:23 PM on 5/18/2020.

## 2020-05-19 ENCOUNTER — TELEPHONE (OUTPATIENT)
Dept: INTERNAL MEDICINE | Facility: CLINIC | Age: 85
End: 2020-05-19

## 2020-06-01 ENCOUNTER — ANTICOAGULATION THERAPY VISIT (OUTPATIENT)
Dept: ANTICOAGULATION | Facility: CLINIC | Age: 85
End: 2020-06-01

## 2020-06-01 DIAGNOSIS — I48.91 ATRIAL FIBRILLATION, UNSPECIFIED TYPE (H): ICD-10-CM

## 2020-06-01 DIAGNOSIS — I82.409 DEEP VEIN THROMBOSIS (DVT) (H): ICD-10-CM

## 2020-06-01 DIAGNOSIS — I25.10 CORONARY ARTERY DISEASE INVOLVING NATIVE HEART WITHOUT ANGINA PECTORIS, UNSPECIFIED VESSEL OR LESION TYPE: ICD-10-CM

## 2020-06-01 DIAGNOSIS — Z79.01 LONG TERM CURRENT USE OF ANTICOAGULANT THERAPY: ICD-10-CM

## 2020-06-01 LAB — INR PPP: 1.97 (ref 0.86–1.14)

## 2020-06-02 DIAGNOSIS — I82.409 DVT (DEEP VENOUS THROMBOSIS) (H): ICD-10-CM

## 2020-06-02 DIAGNOSIS — I48.20 CHRONIC ATRIAL FIBRILLATION (H): ICD-10-CM

## 2020-06-02 RX ORDER — WARFARIN SODIUM 5 MG/1
5 TABLET ORAL DAILY
Qty: 90 TABLET | Refills: 1 | Status: SHIPPED | OUTPATIENT
Start: 2020-06-02 | End: 2020-08-31

## 2020-06-03 RX ORDER — METOPROLOL TARTRATE 25 MG/1
TABLET, FILM COATED ORAL
Qty: 180 TABLET | Refills: 1 | Status: SHIPPED | OUTPATIENT
Start: 2020-06-03 | End: 2021-05-03

## 2020-06-29 ENCOUNTER — HOSPITAL ENCOUNTER (OUTPATIENT)
Facility: CLINIC | Age: 85
Setting detail: SPECIMEN
Discharge: HOME | End: 2020-06-29
Admitting: INTERNAL MEDICINE
Payer: COMMERCIAL

## 2020-06-29 ENCOUNTER — ANTICOAGULATION THERAPY VISIT (OUTPATIENT)
Dept: ANTICOAGULATION | Facility: CLINIC | Age: 85
End: 2020-06-29

## 2020-06-29 DIAGNOSIS — Z79.01 LONG TERM CURRENT USE OF ANTICOAGULANT THERAPY: ICD-10-CM

## 2020-06-29 DIAGNOSIS — I82.409 DEEP VEIN THROMBOSIS (DVT) (H): ICD-10-CM

## 2020-06-29 DIAGNOSIS — I48.91 ATRIAL FIBRILLATION, UNSPECIFIED TYPE (H): ICD-10-CM

## 2020-06-29 LAB — INR PPP: 2.59 (ref 0.86–1.14)

## 2020-06-29 PROCEDURE — 36415 COLL VENOUS BLD VENIPUNCTURE: CPT | Performed by: INTERNAL MEDICINE

## 2020-06-29 PROCEDURE — 85610 PROTHROMBIN TIME: CPT | Performed by: INTERNAL MEDICINE

## 2020-06-29 NOTE — PROGRESS NOTES
ANTICOAGULATION FOLLOW-UP CLINIC VISIT    Patient Name:  Noe Florence  Date:  2020  Contact Type:  Telephone    SUBJECTIVE:         OBJECTIVE    Recent labs: (last 7 days)     20  1634   INR 2.59*       ASSESSMENT / PLAN  INR assessment THER    Recheck INR In: 4 WEEKS    INR Location Clinic      Anticoagulation Summary  As of 2020    INR goal:   1.5-2.5   TTR:   69.9 % (1 y)   INR used for dosin.59! (2020)   Warfarin maintenance plan:   7.5 mg (5 mg x 1.5) every Thu; 5 mg (5 mg x 1) all other days   Full warfarin instructions:   : 2.5 mg; Otherwise 7.5 mg every Thu; 5 mg all other days   Weekly warfarin total:   37.5 mg   Plan last modified:   Sary Parisi RN (3/5/2020)   Next INR check:   2020   Priority:   Maintenance   Target end date:   Indefinite    Indications    Atrial fibrillation (H) [I48.91] [I48.91]  Long-term (current) use of anticoagulants [Z79.01] [Z79.01]  Deep vein thrombosis (DVT) (H) [I82.409]  Atrial fibrillation  unspecified type (H) [I48.91]             Anticoagulation Episode Summary     INR check location:       Preferred lab:       Send INR reminders to:   [facility]    Comments:   Patient contact number: [phone] Hx of Falls/Bleed  Can leave results with Rica (friend)  Restarted Warfarin due to DVT.       Anticoagulation Care Providers     Provider Role Specialty Phone number    Frida Desai MD Referring Cardiology [phone]            See the Encounter Report to view Anticoagulation Flowsheet and Dosing Calendar (Go to Encounters tab in chart review, and find the Anticoagulation Therapy Visit)    Left message for patient with results and dosing recommendations. Asked patient to call back to report any missed doses, falls, signs and symptoms of bleeding or clotting, any changes in health, medication, or diet. Asked patient to call back with any questions or concerns.     Nguyen Zuniga RN

## 2020-07-09 ENCOUNTER — ANCILLARY PROCEDURE (OUTPATIENT)
Dept: CARDIOLOGY | Facility: CLINIC | Age: 85
End: 2020-07-09
Attending: INTERNAL MEDICINE
Payer: COMMERCIAL

## 2020-07-09 DIAGNOSIS — Z95.810 ICD (IMPLANTABLE CARDIOVERTER-DEFIBRILLATOR) IN PLACE: ICD-10-CM

## 2020-07-09 DIAGNOSIS — I47.20 VENTRICULAR TACHYARRHYTHMIA (H): ICD-10-CM

## 2020-07-09 DIAGNOSIS — I48.0 PAROXYSMAL ATRIAL FIBRILLATION (H): ICD-10-CM

## 2020-07-09 PROCEDURE — 93296 REM INTERROG EVL PM/IDS: CPT | Mod: ZF

## 2020-07-09 PROCEDURE — 93295 DEV INTERROG REMOTE 1/2/MLT: CPT | Performed by: INTERNAL MEDICINE

## 2020-07-13 LAB
MDC_IDC_LEAD_IMPLANT_DT: NORMAL
MDC_IDC_LEAD_IMPLANT_DT: NORMAL
MDC_IDC_LEAD_LOCATION: NORMAL
MDC_IDC_LEAD_LOCATION: NORMAL
MDC_IDC_LEAD_LOCATION_DETAIL_1: NORMAL
MDC_IDC_LEAD_LOCATION_DETAIL_1: NORMAL
MDC_IDC_LEAD_MFG: NORMAL
MDC_IDC_LEAD_MFG: NORMAL
MDC_IDC_LEAD_MODEL: NORMAL
MDC_IDC_LEAD_MODEL: NORMAL
MDC_IDC_LEAD_POLARITY_TYPE: NORMAL
MDC_IDC_LEAD_POLARITY_TYPE: NORMAL
MDC_IDC_LEAD_SERIAL: NORMAL
MDC_IDC_LEAD_SERIAL: NORMAL
MDC_IDC_MSMT_BATTERY_DTM: NORMAL
MDC_IDC_MSMT_BATTERY_REMAINING_LONGEVITY: 103 MO
MDC_IDC_MSMT_BATTERY_RRT_TRIGGER: 2.73
MDC_IDC_MSMT_BATTERY_STATUS: NORMAL
MDC_IDC_MSMT_BATTERY_VOLTAGE: 3 V
MDC_IDC_MSMT_CAP_CHARGE_DTM: NORMAL
MDC_IDC_MSMT_CAP_CHARGE_ENERGY: 18 J
MDC_IDC_MSMT_CAP_CHARGE_TIME: 3.86
MDC_IDC_MSMT_CAP_CHARGE_TYPE: NORMAL
MDC_IDC_MSMT_LEADCHNL_RA_IMPEDANCE_VALUE: 456 OHM
MDC_IDC_MSMT_LEADCHNL_RA_PACING_THRESHOLD_AMPLITUDE: 0.62 V
MDC_IDC_MSMT_LEADCHNL_RA_PACING_THRESHOLD_PULSEWIDTH: 0.4 MS
MDC_IDC_MSMT_LEADCHNL_RA_SENSING_INTR_AMPL: 0.62 MV
MDC_IDC_MSMT_LEADCHNL_RA_SENSING_INTR_AMPL: 0.62 MV
MDC_IDC_MSMT_LEADCHNL_RV_IMPEDANCE_VALUE: 304 OHM
MDC_IDC_MSMT_LEADCHNL_RV_IMPEDANCE_VALUE: 399 OHM
MDC_IDC_MSMT_LEADCHNL_RV_PACING_THRESHOLD_AMPLITUDE: 0.75 V
MDC_IDC_MSMT_LEADCHNL_RV_PACING_THRESHOLD_PULSEWIDTH: 0.4 MS
MDC_IDC_MSMT_LEADCHNL_RV_SENSING_INTR_AMPL: 9.25 MV
MDC_IDC_MSMT_LEADCHNL_RV_SENSING_INTR_AMPL: 9.25 MV
MDC_IDC_PG_IMPLANT_DTM: NORMAL
MDC_IDC_PG_MFG: NORMAL
MDC_IDC_PG_MODEL: NORMAL
MDC_IDC_PG_SERIAL: NORMAL
MDC_IDC_PG_TYPE: NORMAL
MDC_IDC_SESS_CLINIC_NAME: NORMAL
MDC_IDC_SESS_DTM: NORMAL
MDC_IDC_SESS_TYPE: NORMAL
MDC_IDC_SET_BRADY_AT_MODE_SWITCH_RATE: 171 {BEATS}/MIN
MDC_IDC_SET_BRADY_HYSTRATE: NORMAL
MDC_IDC_SET_BRADY_LOWRATE: 60 {BEATS}/MIN
MDC_IDC_SET_BRADY_MAX_SENSOR_RATE: 130 {BEATS}/MIN
MDC_IDC_SET_BRADY_MAX_TRACKING_RATE: 130 {BEATS}/MIN
MDC_IDC_SET_BRADY_MODE: NORMAL
MDC_IDC_SET_BRADY_PAV_DELAY_LOW: 180 MS
MDC_IDC_SET_BRADY_SAV_DELAY_LOW: 150 MS
MDC_IDC_SET_LEADCHNL_RA_PACING_AMPLITUDE: 1.5 V
MDC_IDC_SET_LEADCHNL_RA_PACING_ANODE_ELECTRODE_1: NORMAL
MDC_IDC_SET_LEADCHNL_RA_PACING_ANODE_LOCATION_1: NORMAL
MDC_IDC_SET_LEADCHNL_RA_PACING_CAPTURE_MODE: NORMAL
MDC_IDC_SET_LEADCHNL_RA_PACING_CATHODE_ELECTRODE_1: NORMAL
MDC_IDC_SET_LEADCHNL_RA_PACING_CATHODE_LOCATION_1: NORMAL
MDC_IDC_SET_LEADCHNL_RA_PACING_POLARITY: NORMAL
MDC_IDC_SET_LEADCHNL_RA_PACING_PULSEWIDTH: 0.4 MS
MDC_IDC_SET_LEADCHNL_RA_SENSING_ANODE_ELECTRODE_1: NORMAL
MDC_IDC_SET_LEADCHNL_RA_SENSING_ANODE_LOCATION_1: NORMAL
MDC_IDC_SET_LEADCHNL_RA_SENSING_CATHODE_ELECTRODE_1: NORMAL
MDC_IDC_SET_LEADCHNL_RA_SENSING_CATHODE_LOCATION_1: NORMAL
MDC_IDC_SET_LEADCHNL_RA_SENSING_POLARITY: NORMAL
MDC_IDC_SET_LEADCHNL_RA_SENSING_SENSITIVITY: 0.3 MV
MDC_IDC_SET_LEADCHNL_RV_PACING_AMPLITUDE: 2 V
MDC_IDC_SET_LEADCHNL_RV_PACING_ANODE_ELECTRODE_1: NORMAL
MDC_IDC_SET_LEADCHNL_RV_PACING_ANODE_LOCATION_1: NORMAL
MDC_IDC_SET_LEADCHNL_RV_PACING_CAPTURE_MODE: NORMAL
MDC_IDC_SET_LEADCHNL_RV_PACING_CATHODE_ELECTRODE_1: NORMAL
MDC_IDC_SET_LEADCHNL_RV_PACING_CATHODE_LOCATION_1: NORMAL
MDC_IDC_SET_LEADCHNL_RV_PACING_POLARITY: NORMAL
MDC_IDC_SET_LEADCHNL_RV_PACING_PULSEWIDTH: 0.4 MS
MDC_IDC_SET_LEADCHNL_RV_SENSING_ANODE_ELECTRODE_1: NORMAL
MDC_IDC_SET_LEADCHNL_RV_SENSING_ANODE_LOCATION_1: NORMAL
MDC_IDC_SET_LEADCHNL_RV_SENSING_CATHODE_ELECTRODE_1: NORMAL
MDC_IDC_SET_LEADCHNL_RV_SENSING_CATHODE_LOCATION_1: NORMAL
MDC_IDC_SET_LEADCHNL_RV_SENSING_POLARITY: NORMAL
MDC_IDC_SET_LEADCHNL_RV_SENSING_SENSITIVITY: 0.45 MV
MDC_IDC_SET_ZONE_DETECTION_BEATS_DENOMINATOR: 24 {BEATS}
MDC_IDC_SET_ZONE_DETECTION_BEATS_NUMERATOR: 18 {BEATS}
MDC_IDC_SET_ZONE_DETECTION_INTERVAL: 300 MS
MDC_IDC_SET_ZONE_DETECTION_INTERVAL: 320 MS
MDC_IDC_SET_ZONE_DETECTION_INTERVAL: 350 MS
MDC_IDC_SET_ZONE_DETECTION_INTERVAL: 430 MS
MDC_IDC_SET_ZONE_DETECTION_INTERVAL: NORMAL
MDC_IDC_SET_ZONE_TYPE: NORMAL
MDC_IDC_STAT_AT_BURDEN_PERCENT: 0 %
MDC_IDC_STAT_AT_DTM_END: NORMAL
MDC_IDC_STAT_AT_DTM_START: NORMAL
MDC_IDC_STAT_BRADY_AP_VP_PERCENT: 28.75 %
MDC_IDC_STAT_BRADY_AP_VS_PERCENT: 46.54 %
MDC_IDC_STAT_BRADY_AS_VP_PERCENT: 6.33 %
MDC_IDC_STAT_BRADY_AS_VS_PERCENT: 18.38 %
MDC_IDC_STAT_BRADY_DTM_END: NORMAL
MDC_IDC_STAT_BRADY_DTM_START: NORMAL
MDC_IDC_STAT_BRADY_RA_PERCENT_PACED: 70.99 %
MDC_IDC_STAT_BRADY_RV_PERCENT_PACED: 34.8 %
MDC_IDC_STAT_EPISODE_RECENT_COUNT: 0
MDC_IDC_STAT_EPISODE_RECENT_COUNT_DTM_END: NORMAL
MDC_IDC_STAT_EPISODE_RECENT_COUNT_DTM_START: NORMAL
MDC_IDC_STAT_EPISODE_TOTAL_COUNT: 0
MDC_IDC_STAT_EPISODE_TOTAL_COUNT: 4
MDC_IDC_STAT_EPISODE_TOTAL_COUNT: 59
MDC_IDC_STAT_EPISODE_TOTAL_COUNT_DTM_END: NORMAL
MDC_IDC_STAT_EPISODE_TOTAL_COUNT_DTM_START: NORMAL
MDC_IDC_STAT_EPISODE_TYPE: NORMAL
MDC_IDC_STAT_TACHYTHERAPY_ATP_DELIVERED_RECENT: 0
MDC_IDC_STAT_TACHYTHERAPY_ATP_DELIVERED_TOTAL: 0
MDC_IDC_STAT_TACHYTHERAPY_RECENT_DTM_END: NORMAL
MDC_IDC_STAT_TACHYTHERAPY_RECENT_DTM_START: NORMAL
MDC_IDC_STAT_TACHYTHERAPY_SHOCKS_ABORTED_RECENT: 0
MDC_IDC_STAT_TACHYTHERAPY_SHOCKS_ABORTED_TOTAL: 0
MDC_IDC_STAT_TACHYTHERAPY_SHOCKS_DELIVERED_RECENT: 0
MDC_IDC_STAT_TACHYTHERAPY_SHOCKS_DELIVERED_TOTAL: 0
MDC_IDC_STAT_TACHYTHERAPY_TOTAL_DTM_END: NORMAL
MDC_IDC_STAT_TACHYTHERAPY_TOTAL_DTM_START: NORMAL

## 2020-07-14 NOTE — LETTER
6/17/2019        RE: Noe Florence  [address] [facility] [address]        [facility]  [place] Medical Record Number:  [id number]  Place of Service where encounter took place[facility] [[id number]]  Chief Complaint   Patient presents with     Nursing Home Acute       HPI:    Noe Florence  is a 84 year old (1935), who is being seen today for an episodic care visit.  HPI information obtained from: facility chart records, facility staff, patient report and [facility] chart review. Today's concern is:     Closed fracture of distal end of right humerus with routine healing, unspecified fracture morphology, subsequent encounter  Personal history of fall  Laceration of scalp, subsequent encounter  CKD (chronic kidney disease), stage III (H)  Chronic atrial fibrillation (H)  Coronary artery disease involving native coronary artery of native heart, angina presence unspecified  S/P CABG (coronary artery bypass graft)  Ischemic cardiomyopathy  Hypertension, unspecified type  Hyperlipidemia, unspecified hyperlipidemia type  Hx of fracture of femur  History of deep venous thrombosis  Malignant neoplasm of transverse colon (H)  Benign prostatic hyperplasia with urinary frequency  Urinary urgency  Weight loss, unintentional  Anemia, unspecified type  Depression, unspecified depression type  Multifactorial gait disorder  Generalized muscle weakness  Physical deconditioning     Per Epic note/hx:  Patient is a pleasant 84 year old gentleman with PMH of HTN,HLD, CKD3, CAD s/p CABG x 2, JEREMY x 2, Vtach s/p ICD placement (5, 2012, generator change 12/2018), a fib s/p CTI (6/2014) and ablation (9/2014), sigmoid cancer s/p sigmoid colectomy, 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  His humerus fracture  Pt notified of test results and of Dr. Stroud recommendations.  Pt verbalized understanding regarding all results. Pt states that he is only taking 1/2 tab of Rosuvastatin 20mg daily, not the whole tab as prescribed.   Call transferred to  staff for pt to schedule appt for in office physical exam.  Pt states he will discuss cholesterol and medication with Dr. Stroud at the appt.   "was reduced in the ED, Ortho was consulted and ultimately decision made to treat medically with splint and f/u with Ortho for likely cast.  Discussion with family and patient about OA and decision made to stop warfarin until f/u with Cardiology.  Scalp laceration was reportedly sutured with expected removal date 6/1/19 without sutures in place at today's visit.      05/29/19 patient had f/u with Orthopedics where patient was transitioned from splint into cast, continue NWB of RUE, f/u in 2 weeks.     Patient is met today in TCU where he reports no SOB, CP, HA, lightheadedness, heartburn, upset stomach.  He reports his bowel are better managed now with no diarrhea or constipation.  He reports he is \"sore\" but overall would deny pain and has not really noticed the GDR of Tylenol (since last visit).  He reports a f/u on 7/3/19 with [facility] at which time he is hoping for cast removal of RUE.        Past Medical and Surgical History reviewed in Epic today.    MEDICATIONS:  Current Outpatient Medications   Medication Sig Dispense Refill     acetaminophen (TYLENOL) 500 MG tablet Take 2 tablets (1,000 mg) by mouth 2 times daily. May also take 2 tablets (1,000 mg) daily as needed for mild pain.       aspirin 81 MG tablet Take 1 tablet by mouth daily.       atorvastatin (LIPITOR) 40 MG tablet Take 0.5 tablets (20 mg) by mouth daily as directed. 90 tablet 0     calcium carbonate (TUMS) 500 MG chewable tablet Take 1 chew tab by mouth 2 times daily       cholecalciferol 1000 units TABS Take 1,000 Units by mouth daily        Ferrous Sulfate 324 (65 Fe) MG TBEC Take 324 mg by mouth daily       metoprolol tartrate (LOPRESSOR) 25 MG tablet Take 12.5 mg by mouth 2 times daily  180 tablet 3     mirtazapine (REMERON) 15 MG tablet Take 15 mg by mouth At Bedtime.       sertraline (ZOLOFT) 100 MG tablet Take 150 mg by mouth every evening        tamsulosin (FLOMAX) 0.4 MG capsule Take 2 capsules (0.8 mg) by mouth daily 30 capsule 3 "     REVIEW OF SYSTEMS:  Limited secondary to cognitive impairment but today pt reports 10 point ROS of systems including Constitutional, Eyes, Respiratory, Cardiovascular, Gastroenterology, Genitourinary, Integumentary, Musculoskeletal, Psychiatric were all negative except for pertinent positives noted in my HPI.    Objective:  /76   Pulse 62   Temp 97.5  F (36.4  C)   Resp 20   Wt 66.2 kg (146 lb)   SpO2 97%   BMI 22.87 kg/m     Exam:  GENERAL APPEARANCE:  Alert, in no distress, deconditioned, very thin, pleasant  RESP:  respiratory effort and palpation of chest normal, auscultation of lungs clear, no respiratory distress  CV:  Palpation and auscultation of heart done , rate and rhythm regular with ectopy, no murmur, no LE peripheral edema, +2 RUE radial pulse and + CMS  ABDOMEN:  normal bowel sounds, soft, nontender, no hepatosplenomegaly or other masses  M/S:   Gait and station with W/C for mobility, Digits and nails with arthritic changes, bilat knee old scars, reduced muscle mass, RUE with cast and sling, + CMS, + 2 pulse in RUE   SKIN:  Inspection and Palpation of skin and subcutaneous tissue with various bruised areas, fragile skin, scalp laceration/abrasion nearly 100% healed.    PSYCH:  insight and judgement, memory with mild impairment, affect and mood normal, follows commands readily       Labs:   Labs done while at [facility] done by [place] lab. Pertinent results are: reviewed    ASSESSMENT/PLAN:  Closed fracture of distal end of right humerus with routine healing, unspecified fracture morphology, subsequent encounter  Personal history of fall  S/p fall, RUE humerus fracture with reduction in ED  Ortho consulted and after RvB, medical management treatment plan in place with splint and sling, f/u soon for likely cast.     5/29/19 Ortho f/u with cast placed, continue RUE NWB, sling, RETURN TO CLINIC in 2 weeks.      Analgesia with (changed) Tylenol 1000 mg BID and daily PRN - x0  "usage.     Patient reports \"soreness\" but denies \"pain.\"  He endorses that he did not notice the difference from TID --> BID dosing.      PLAN:  - f/u with [place]o [place] [facility]  - (new) Tylenol to 1000 mg BID, monitor for ability to GDR  - add (new) Tylenol 1000 mg daily PRN   - NWB to RUE      Laceration of scalp, subsequent encounter  Head CT neg in ED for acute pathology  discharge summary reports sutures placed in ED, to be removed 6/1/19.  Last visit without sutures present. Erythema noted under foam dressing.     Exam today with area nearly 100% healed.      PLAN:  - monitor for s/s of infection     CKD (chronic kidney disease), stage III (H)  BL Creat around 1.2  Mild ARSALAN on CKD this hospitalization  Last few BMPs:   5/22/19: BUN x, Creat 1.42, GFR 45  5/23/19: BUN 58, Creat 1.34, GFR 48  5/24/19: BUN 52, Creat 1.36, GFR 47  6/3/19: BUN 56, Creat 1.36, GFR 47     PLAN:  - Will avoid nephrotoxic agents (such as ACEI/ARB/NSAIDs, IV contrast) and mindful prescribing with renal dosing.        Chronic atrial fibrillation (H)  S/P ablation of atrial flutter  F/B: Cardiology, Dr Baird, [facility] and [facility]  S/p CTI (6/2014), right atrial appendage atrial tach ablation (9/2014), ICD placement (5/2012 - [employer] dual lead ICD)      RvB of OA discussed this hosp after fall and scalp laceration and decision to hold warfarin at this time until f/u with Cardiology  Remains on PTA ASA 81 mg daily     On PTA Metoprolol 12.5 mg BID for rate control  HRs 62-66 mostly, regular with ectopy but does return to regular today      PLAN:  - PTA warfarin on hold until Cardiology f/u (can discuss RvB with patient's wife and if wish for restart, can do this. Patient's wife not presenttoady)  - PTA ASA 81 mg daily remains  - PTA metoprolol 12.5 mg BID remains  - monitor for rate control.   - f/u with device check as scheduled.      Coronary artery disease involving native coronary artery of native heart, angina " presence unspecified  S/P CABG (coronary artery bypass graft) x 2  Ischemic cardiomyopathy  Hypertension, unspecified type  Hyperlipidemia, unspecified hyperlipidemia type  F/B: Cardiology, Dr Baird, [facility] and [facility] C[facility]  s/p CABG x 2, JEREMY x 2, Vtach s/p ICD placement (5, 2012, generator change 12/2018), a fib s/p CTI (6/2014) and ablation (9/2014)     On PTA ASA 81 mg daily, atorvastatin 20 mg daily, metoprolol 12.5 mg BID     BPs 120-150s/70s  HRs 62-66 mostly  Patient denies CP, HA, lightheadedness      PLAN:    - continue PTA ASA, statin, metoprolol  - monitor for titration needs  - orthostatic BP monitoring since patient has had falls  - f/u with Cardiology as planned      Hx of fracture of left femur s/p IM nailing (3/14/19)  F/b Dr Avalos, [facility] Orth[facility]  Has been working with therapy since fracture but has trouble with ambulation, now s/p fall     Analgesia as above   Patient denies pain to left femur      PLAN:  - f/u with Dr Avalos on 6/11/19 not done, f/u out-patient after TCU discharge  - Physical Therapy, Occupational Therapy for strengthening, mobility, etc.   - analgesia as above      History of Left mid femoral vein nonocclusive thrombus  Dx'd 2009; on coumadin for a period of time  Rediscovered during femur fracture hospitalization; patient didn't know that the DVT has not resolved.   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  No s/s of DVT today        PLAN:  - PTA ASA 81 gm daily remains  - monitor for leg pain, swelling, s/s of DVT.      Malignant neoplasm of transverse colon (H)  F/b: Dr Gilliam, Medical Oncology  S/p prior sigmoid colon cancer s/p sigmoid colectomy, then new transverse colon cancer s/p lap--> open transverse colectomy, lysis of adhesions 2/1/19    Exam previous visit with old scars; healed.      PLAN:  - f/u with Dr Gilliam on 6/10/19 not completed, recommend f/u post TCU discharge   - Colorectal Surgeon recommending colonoscopies every year  because of metachronous second colon cancer in only 4 years.      Benign prostatic hyperplasia with urinary frequency  Urinary urgency  Significant difficulties with urinary frequency and urgency which patient reports has been sent his femur fracture and March, 2019.  Previously was on doxazosin which was DC'd as thought ineffective.  Was seen by urology 4/10/19 Dr Lowe who DC'd doxazosin and started tamsulosin 0.4 mg daily.  Patient's wife reporting this has helped mildly with only having to get up 3 times a night now versus every 1.5 hours prior to medication.     5/23/19 WBC 14.07 --> 8.87 on 5/24 5/23/19 UA neg  5/27/19 UA neg      5/30/19: Tamsulosin increased to 0.8 mg     PLAN:   - (Increased) tamsulosin to 0.8 mg daily   - f/u with Urology as planned who noted possible need for UDT to eval bladder function and/or level of outlet dysfunction.    - Physical Therapy, Occupational Therapy for bladder program      Weight loss, unintentional  Body mass index is 22.87 kg/m .  Patient has had significant recent history of multiple surgeries and hospitalizations.  Was started on boost supplements by PCP  Discussion with patient's wife regarding supplements that do not have milk products    Weights stable 144-146 lbs     PLAN:  - Boost Breeze (juice-based) BID between meals  - monitor weights      Anemia, unspecified type  Baseline Hgb noted to be 7-9 recently  Had surgery for colon cancer in Feb, 2019  Had surgery for left femur fracture March, 2019 s/p transfusions   Primary Care Provider noted long history of anemia (prior to surgeries)      1/2017/ 6/3/19  Ferritin 46   Folate 29.7  Iron 75 / 47  Iron binding 311 / 278  Iron Sat Index 24 / 17  Most recent Vitamin B12 - 2782     5/22/19 - hgb 9.3  5/23/19 - 8.7  5/24/19 - Hgb 8.0 (no surgery this last hospitalization)  Warfarin DC'd (remains on PTA ASA)  6/3/19: Hgb 7.8 - started on Fe Sulfate 325 mg daily     PLAN:  - (new) Fe sulfate 325 mg daily  - goal Hgb  >7.0  - recheck Hgb on Thursday, 6/20/19      Depression  PTA mirtazapine 15 mg q HS, sertraline 250 mg q HS.   Significant health concerns with various hospitalizations recently.    Patient pleasant at today's visit, smiling.   PHQ9  In SNF - 5 (mild)      PLAN:  - consider in-house psych consult  - monitor weights, mood.         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  F/u Neurology, Dr Jase Duarte, [facility]  Wears soft brace on left foot/ankle.      PLAN:  - Physical Therapy, Occupational Therapy for strengthening, mobility, etc.      Generalized muscle weakness  Physical deconditioning  Various acute illness and surgeries with hospitalizations on chronic disease conditions.   Extensive deconditioning with weight loss and overall decline    Therapy Update:   Min A for STS transfers  Ambulating 110 ft with narrow base quad cane  Mod A for toileting  SBA for dressing  CPT 4.6/5.6  MoCA 17/30.      PLAN:  - Physical Therapy, Occupational Therapy for strengthening, mobility, etc.     Orders written by provider at facility  1. Hgb recheck on Thursday, 6/20/19    Total time with patient visit: 25 minutes including discussions about the POC and care coordination with the patient and nursing, therapy. Greater than 50% of total time spent with counseling and coordinating care due to review of SNF EHR, patient visit with discussion about above diagnoses, coordination of care with nursing and therapy.      Electronically signed by:  MARIA LUISA Villanueva CNP         duplicate      Sincerely,        MARIA LUISA Villanueva CNP

## 2020-07-30 DIAGNOSIS — I48.91 ATRIAL FIBRILLATION, UNSPECIFIED TYPE (H): ICD-10-CM

## 2020-07-30 DIAGNOSIS — Z79.01 LONG TERM CURRENT USE OF ANTICOAGULANT THERAPY: ICD-10-CM

## 2020-07-30 DIAGNOSIS — I82.409 DEEP VEIN THROMBOSIS (DVT) (H): ICD-10-CM

## 2020-07-30 LAB — INR PPP: 2.15 (ref 0.86–1.14)

## 2020-07-31 ENCOUNTER — ANTICOAGULATION THERAPY VISIT (OUTPATIENT)
Dept: ANTICOAGULATION | Facility: CLINIC | Age: 85
End: 2020-07-31

## 2020-07-31 DIAGNOSIS — I82.409 DEEP VEIN THROMBOSIS (DVT) (H): ICD-10-CM

## 2020-07-31 DIAGNOSIS — I48.91 ATRIAL FIBRILLATION, UNSPECIFIED TYPE (H): ICD-10-CM

## 2020-07-31 DIAGNOSIS — Z79.01 LONG TERM CURRENT USE OF ANTICOAGULANT THERAPY: ICD-10-CM

## 2020-07-31 NOTE — PROGRESS NOTES
ANTICOAGULATION FOLLOW-UP CLINIC VISIT    Patient Name:  Noe Florence  Date:  2020  Contact Type:  Telephone    SUBJECTIVE:  Patient Findings     Comments:   Spoke to Nicolette.        Clinical Outcomes     Comments:   Spoke to Nicolette.           OBJECTIVE    Recent labs: (last 7 days)     20  1635   INR 2.15*       ASSESSMENT / PLAN  INR assessment THER    Recheck INR In: 4 WEEKS    INR Location Clinic      Anticoagulation Summary  As of 2020    INR goal:   1.5-2.5   TTR:   76.1 % (1 y)   INR used for dosin.15 (2020)   Warfarin maintenance plan:   7.5 mg (5 mg x 1.5) every Thu; 5 mg (5 mg x 1) all other days   Full warfarin instructions:   7.5 mg every Thu; 5 mg all other days   Weekly warfarin total:   37.5 mg   No change documented:   Ciro Sharp RN   Plan last modified:   Sary Parisi RN (3/5/2020)   Next INR check:   2020   Priority:   Maintenance   Target end date:   Indefinite    Indications    Atrial fibrillation (H) [I48.91] [I48.91]  Long-term (current) use of anticoagulants [Z79.01] [Z79.01]  Deep vein thrombosis (DVT) (H) [I82.409]  Atrial fibrillation  unspecified type (H) [I48.91]             Anticoagulation Episode Summary     INR check location:       Preferred lab:       Send INR reminders to:   [facility]    Comments:   Patient contact number: [phone] Hx of Falls/Bleed  Can leave results with Rica (friend)  Restarted Warfarin due to DVT.       Anticoagulation Care Providers     Provider Role Specialty Phone number    Frida Desai MD Referring Cardiology [phone]            See the Encounter Report to view Anticoagulation Flowsheet and Dosing Calendar (Go to Encounters tab in chart review, and find the Anticoagulation Therapy Visit)    INR/CFX/F2 RESULT: INR result is 2.15    ASSESSMENT:No Problems found. No Changes in Health, Medications, or diet. No Signs of bruising, bleeding or clotting.    DOSING ADJUSTMENT: continue maintenance  dose    NEXT INR/FACTOR X OR FACTOR II:8/25    PROTOCOL FOLLOWED:goal 1.5-2.5    Ciro Sharp, RN

## 2020-08-21 ENCOUNTER — ANTICOAGULATION THERAPY VISIT (OUTPATIENT)
Dept: ANTICOAGULATION | Facility: CLINIC | Age: 85
End: 2020-08-21

## 2020-08-21 DIAGNOSIS — I48.91 ATRIAL FIBRILLATION, UNSPECIFIED TYPE (H): ICD-10-CM

## 2020-08-21 DIAGNOSIS — I82.409 DEEP VEIN THROMBOSIS (DVT) (H): ICD-10-CM

## 2020-08-21 DIAGNOSIS — Z79.01 LONG TERM CURRENT USE OF ANTICOAGULANT THERAPY: ICD-10-CM

## 2020-08-21 DIAGNOSIS — C18.9 MALIGNANT NEOPLASM OF COLON, UNSPECIFIED PART OF COLON (H): ICD-10-CM

## 2020-08-21 LAB
ALBUMIN SERPL-MCNC: 3.5 G/DL (ref 3.4–5)
ALP SERPL-CCNC: 95 U/L (ref 40–150)
ALT SERPL W P-5'-P-CCNC: 23 U/L (ref 0–70)
ANION GAP SERPL CALCULATED.3IONS-SCNC: 5 MMOL/L (ref 3–14)
AST SERPL W P-5'-P-CCNC: 18 U/L (ref 0–45)
BASOPHILS # BLD AUTO: 0 10E9/L (ref 0–0.2)
BASOPHILS NFR BLD AUTO: 0.4 %
BILIRUB SERPL-MCNC: 0.3 MG/DL (ref 0.2–1.3)
BUN SERPL-MCNC: 47 MG/DL (ref 7–30)
CALCIUM SERPL-MCNC: 9 MG/DL (ref 8.5–10.1)
CEA SERPL-MCNC: 4.6 UG/L (ref 0–2.5)
CHLORIDE SERPL-SCNC: 111 MMOL/L (ref 94–109)
CO2 SERPL-SCNC: 25 MMOL/L (ref 20–32)
CREAT SERPL-MCNC: 1.54 MG/DL (ref 0.66–1.25)
DIFFERENTIAL METHOD BLD: ABNORMAL
EOSINOPHIL # BLD AUTO: 0.1 10E9/L (ref 0–0.7)
EOSINOPHIL NFR BLD AUTO: 1.6 %
ERYTHROCYTE [DISTWIDTH] IN BLOOD BY AUTOMATED COUNT: 14.3 % (ref 10–15)
GFR SERPL CREATININE-BSD FRML MDRD: 40 ML/MIN/{1.73_M2}
GLUCOSE SERPL-MCNC: 96 MG/DL (ref 70–99)
HCT VFR BLD AUTO: 35.9 % (ref 40–53)
HGB BLD-MCNC: 11.3 G/DL (ref 13.3–17.7)
IMM GRANULOCYTES # BLD: 0 10E9/L (ref 0–0.4)
IMM GRANULOCYTES NFR BLD: 0.3 %
INR PPP: 2.38 (ref 0.86–1.14)
LYMPHOCYTES # BLD AUTO: 0.8 10E9/L (ref 0.8–5.3)
LYMPHOCYTES NFR BLD AUTO: 11 %
MCH RBC QN AUTO: 30.5 PG (ref 26.5–33)
MCHC RBC AUTO-ENTMCNC: 31.5 G/DL (ref 31.5–36.5)
MCV RBC AUTO: 97 FL (ref 78–100)
MONOCYTES # BLD AUTO: 0.6 10E9/L (ref 0–1.3)
MONOCYTES NFR BLD AUTO: 8.3 %
NEUTROPHILS # BLD AUTO: 5.4 10E9/L (ref 1.6–8.3)
NEUTROPHILS NFR BLD AUTO: 78.4 %
NRBC # BLD AUTO: 0 10*3/UL
NRBC BLD AUTO-RTO: 0 /100
PLATELET # BLD AUTO: 164 10E9/L (ref 150–450)
POTASSIUM SERPL-SCNC: 4.5 MMOL/L (ref 3.4–5.3)
PROT SERPL-MCNC: 7.7 G/DL (ref 6.8–8.8)
RBC # BLD AUTO: 3.7 10E12/L (ref 4.4–5.9)
SODIUM SERPL-SCNC: 141 MMOL/L (ref 133–144)
WBC # BLD AUTO: 6.8 10E9/L (ref 4–11)

## 2020-08-21 NOTE — PROGRESS NOTES
ANTICOAGULATION FOLLOW-UP CLINIC VISIT    Patient Name:  Noe Florence  Date:  2020  Contact Type:  Telephone    SUBJECTIVE:  Patient Findings         Clinical Outcomes     Negatives:   Major bleeding event, Thromboembolic event, Anticoagulation-related hospital admission, Anticoagulation-related ED visit, Anticoagulation-related fatality           OBJECTIVE    Recent labs: (last 7 days)     20  1642   INR 2.38*       ASSESSMENT / PLAN  INR assessment THER    Recheck INR In: 4 WEEKS    INR Location Clinic      Anticoagulation Summary  As of 2020    INR goal:   1.5-2.5   TTR:   76.1 % (1 y)   INR used for dosin.38 (2020)   Warfarin maintenance plan:   7.5 mg (5 mg x 1.5) every Thu; 5 mg (5 mg x 1) all other days   Full warfarin instructions:   7.5 mg every Thu; 5 mg all other days   Weekly warfarin total:   37.5 mg   No change documented:   Nguyen Zuniga, RN   Plan last modified:   Sary Parisi RN (3/5/2020)   Next INR check:   2020   Priority:   Maintenance   Target end date:   Indefinite    Indications    Atrial fibrillation (H) [I48.91] [I48.91]  Long-term (current) use of anticoagulants [Z79.01] [Z79.01]  Deep vein thrombosis (DVT) (H) [I82.409]  Atrial fibrillation  unspecified type (H) [I48.91]             Anticoagulation Episode Summary     INR check location:       Preferred lab:       Send INR reminders to:   [facility]    Comments:   Patient contact number: [phone] Hx of Falls/Bleed  Can leave results with Rica (friend)  Restarted Warfarin due to DVT.       Anticoagulation Care Providers     Provider Role Specialty Phone number    Frida Desai MD Referring Cardiology [phone]            See the Encounter Report to view Anticoagulation Flowsheet and Dosing Calendar (Go to Encounters tab in chart review, and find the Anticoagulation Therapy Visit)    Spoke with patient's friend Rica. Gave them lab results and new warfarin recommendation.  No  changes in health, medication, or diet. No missed doses, no falls. No signs or symptoms of bleed or clotting.     Nguyen Zuniga RN

## 2020-08-25 ENCOUNTER — VIRTUAL VISIT (OUTPATIENT)
Dept: ONCOLOGY | Facility: CLINIC | Age: 85
End: 2020-08-25
Attending: NURSE PRACTITIONER
Payer: COMMERCIAL

## 2020-08-25 DIAGNOSIS — C18.9 MALIGNANT NEOPLASM OF COLON, UNSPECIFIED PART OF COLON (H): Primary | ICD-10-CM

## 2020-08-25 PROCEDURE — 40001009 ZZH VIDEO/TELEPHONE VISIT; NO CHARGE

## 2020-08-25 PROCEDURE — 99214 OFFICE O/P EST MOD 30 MIN: CPT | Mod: 95 | Performed by: NURSE PRACTITIONER

## 2020-08-25 NOTE — LETTER
September 1, 2020       TO: Noe Florence  [address] [place] [id number]       DearMr.Ludivina,    We are writing to inform you of your test results.    {[facility] results letter list:[id number]}    No results found from the In Basket message.    ***

## 2020-08-25 NOTE — PROGRESS NOTES
"Noe Florence is a 85 year old male who is being evaluated via a billable video visit.      The patient has been notified of following:     \"This video visit will be conducted via a call between you and your physician/provider. We have found that certain health care needs can be provided without the need for an in-person physical exam.  This service lets us provide the care you need with a video conversation.  If a prescription is necessary we can send it directly to your pharmacy.  If lab work is needed we can place an order for that and you can then stop by our lab to have the test done at a later time.    Video visits are billed at different rates depending on your insurance coverage.  Please reach out to your insurance provider with any questions.    If during the course of the call the physician/provider feels a video visit is not appropriate, you will not be charged for this service.\"    Patient has given verbal consent for Video visit? Yes    How would you like to obtain your AVS? MyChart     If you are dropped from the video visit, the video invite should be resent to: Text to cell phone: [phone]     Will anyone else be joining your video visit? No          I have reviewed and updated the patient's allergies and medication list. Patient was asked to provide any patient recorded vital signs, height and/or weight.  Please see \"Patient Reported Vital Signs\" tab for that information.        Concerns: Patient has no new concerns.      Refills: None       SURYA Dupree    Video-Visit Details    Type of service:  Video Visit    Video Start Time: 1255  Video End Time: 1:20    Originating Location (pt. 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 More recently he was found to have a new cancer in his transverse colon for which he underwent resection in February 2019.  His tumor proved to be a stage II. Adjuvant therapy was not recommended in light of his advanced age and co morbidities. He has been followed every 6 months for CEA and CT's to evaluate for recurrence.     Interval history: Has had some falls, in May 2019. If using a walker, can go for quite a while. Sleeping downstairs, its just easier than upstairs. Since COVID, getting out and walking has been more limited. Hard for him being less mobile, he misses playing tennis. Can walk down hallways but only gets outside for a walk once per week with his son. Also does some PT exercises with his son once per week. They stopped working with therapy after COVID started. Good appetite, 3 meals per day. Glass of liquid (water or juice) with meals. No dizziness or lightheadedness. Incontinence but no changes in bowel habits. No new abdominal pain. Some muscle twinging when he moves quickly. No cough or shortness of breath. Occasionally some sputum production. No chest pain or palpitations. No fever/chills. Overall denies generalized pain.     Current Outpatient Medications   Medication Sig Dispense Refill     alendronate (FOSAMAX) 70 MG tablet Take 1 tablet (70 mg) by mouth every 7 days Take with a full glass of water and do not eat or lay down for 30 minutes 12 tablet 3     ARIPiprazole (ABILIFY) 2 MG tablet Take 2 mg by mouth daily       atorvastatin (LIPITOR) 40 MG tablet Take 1 tablet (40 mg) by mouth daily 90 tablet 3     calcium carbonate 500 mg, elemental, (OSCAL;OYSTER SHELL CALCIUM) 500 MG tablet Take 1 tablet (500 mg) by mouth 2 times daily 180 tablet 3     cholecalciferol 1000 units TABS Take 1,000 Units by mouth daily        COMPRESSION STOCKINGS 1 each daily 1 each 4     Cyanocobalamin (VITAMIN B-12 CR PO)        ferrous sulfate (FE TABS) 325 (65  "Fe) MG EC tablet Take 325 mg by mouth daily       metoprolol tartrate (LOPRESSOR) 25 MG tablet TAKE 1 TABLET BY MOUTH TWO TIMES A  tablet 1     mirtazapine (REMERON) 15 MG tablet Take 15 mg by mouth At Bedtime.       Multiple Vitamins-Minerals (MULTIVITAMIN ADULT PO)        polyethylene glycol (MIRALAX) packet Take 238 g by mouth See Admin Instructions Start at 4 pm night prior to colonoscopy. Refer to \"Getting Ready for Colonoscopy\" instructions. 238 g 0     sertraline (ZOLOFT) 100 MG tablet Take 150 mg by mouth every evening        tamsulosin (FLOMAX) 0.4 MG capsule Take 2 capsules (0.8 mg) by mouth daily 180 capsule 3     warfarin (COUMADIN) 5 MG tablet Take 1 tablet (5 mg) by mouth daily Or as directed by [facility] 60 tablet 0     warfarin ANTICOAGULANT (COUMADIN) 5 MG tablet Take 1 tablet (5 mg) by mouth daily 90 tablet 1       Allergies   Allergen Reactions     Latex Rash     Rash     Objective:  There were no vitals taken for this visit.  General: alert and no distress, elderly frail man  Psych: Alert and oriented times; coherent speech, normal rate and volume, able to articulate logical thoughts, able to abstract reason, no tangential thoughts, no hallucinations or delusions  Patient's affect is appropriate.   Pulm: Speaking in full sentences, unlabored, no audible wheezes or cough.  The rest of a comprehensive physical examination is deferred due to PHE (public health emergency) video restrictions\"    Labs:    8/21/2020 16:42   Sodium 141   Potassium 4.5   Chloride 111 (H)   Carbon Dioxide 25   Urea Nitrogen 47 (H)   Creatinine 1.54 (H)   GFR Estimate 40 (L)   GFR Estimate If Black 47 (L)   Calcium 9.0   Anion Gap 5   Albumin 3.5   Protein Total 7.7   Bilirubin Total 0.3   Alkaline Phosphatase 95   ALT 23   AST 18   Glucose 96   WBC 6.8   Hemoglobin 11.3 (L)   Hematocrit 35.9 (L)   Platelet Count 164   RBC Count 3.70 (L)   MCV 97   MCH 30.5   MCHC 31.5   RDW 14.3   Diff Method Automated Method "   % Neutrophils 78.4   % Lymphocytes 11.0   % Monocytes 8.3   % Eosinophils 1.6   % Basophils 0.4   % Immature Granulocytes 0.3   Nucleated RBCs 0   Absolute Neutrophil 5.4   Absolute Lymphocytes 0.8   Absolute Monocytes 0.6   Absolute Eosinophils 0.1   Absolute Basophils 0.0   Abs Immature Granulocytes 0.0   Absolute Nucleated RBC 0.0   INR 2.38 (H)   CEA 4.6 (H)     Imaging: n/a    Impression/plan:   Resected colon cancer 2x, currently without evidence of disease:   Assessment today is not concerning for recurrent disease. Sha has a good appetite and denies abdominal or bowel changes. His CEA is 4.6 which is slightly lower than February when he saw Dr. Gilliam. In the setting of his renal insufficiency, will forgo CT scan in February 2021 and start with CEA and meeting with Dr. Gilliam. Based on his assessment, a CT could be complete. Sha's wife asks about if a colonoscopy would be indicated since that is how his most recent recurrence was found. We discussed that as long as Sha is feeling well and his tumor markers are stable, we will likely hold off but it is something that could be revisited in February depending on how he is doing then.     Sha should continue close follow up with his PCP for his numerous chronic medical conditions. I also encouraged them to reach back out to their PT program now that clinics are opening up services again to continue to promote activity in the setting of the pandemic.     Kayla Gutierres CNP on 8/25/2020 at 4:03 PM    The patient was seen in conjunction with Kayla Gutierres CNP who served as a scribe for today's visit. I have reviewed the note and agree with the above findings and plan. TW

## 2020-08-29 DIAGNOSIS — R35.0 URINARY FREQUENCY: ICD-10-CM

## 2020-08-31 DIAGNOSIS — R35.0 URINARY FREQUENCY: ICD-10-CM

## 2020-08-31 DIAGNOSIS — I48.20 CHRONIC ATRIAL FIBRILLATION (H): ICD-10-CM

## 2020-08-31 DIAGNOSIS — I82.409 DVT (DEEP VENOUS THROMBOSIS) (H): ICD-10-CM

## 2020-08-31 RX ORDER — WARFARIN SODIUM 5 MG/1
TABLET ORAL
Qty: 90 TABLET | Refills: 5 | Status: SHIPPED | OUTPATIENT
Start: 2020-08-31 | End: 2020-11-23

## 2020-08-31 RX ORDER — TAMSULOSIN HYDROCHLORIDE 0.4 MG/1
0.8 CAPSULE ORAL DAILY
Qty: 180 CAPSULE | Refills: 2 | Status: SHIPPED | OUTPATIENT
Start: 2020-08-31 | End: 2021-05-03

## 2020-09-03 RX ORDER — TAMSULOSIN HYDROCHLORIDE 0.4 MG/1
CAPSULE ORAL
Qty: 180 CAPSULE | Refills: 0 | OUTPATIENT
Start: 2020-09-03

## 2020-09-21 ENCOUNTER — ANTICOAGULATION THERAPY VISIT (OUTPATIENT)
Dept: ANTICOAGULATION | Facility: CLINIC | Age: 85
End: 2020-09-21

## 2020-09-21 DIAGNOSIS — Z79.01 LONG TERM CURRENT USE OF ANTICOAGULANT THERAPY: ICD-10-CM

## 2020-09-21 DIAGNOSIS — I82.409 DEEP VEIN THROMBOSIS (DVT) (H): ICD-10-CM

## 2020-09-21 DIAGNOSIS — I48.91 ATRIAL FIBRILLATION, UNSPECIFIED TYPE (H): ICD-10-CM

## 2020-09-21 LAB — INR PPP: 2.53 (ref 0.86–1.14)

## 2020-09-21 NOTE — PROGRESS NOTES
Addendum 9/21/20 S.O. Rica called back requesting if we just change pt's maintenance dose to 5mg Daily and he will recheck an INR in two weeks. Writer was ok with this and changed the calendar. Nguyen Zuniga RN

## 2020-09-21 NOTE — PROGRESS NOTES
ANTICOAGULATION FOLLOW-UP CLINIC VISIT    Patient Name:  Noe Florence  Date:  2020  Contact Type:  Telephone    SUBJECTIVE:  Patient Findings     Comments:   JOSSY Strauss reports she has been trying to incorporate spinach into pt's diet again, but thinks maybe she should get some more.         Clinical Outcomes     Comments:   JOSSY Strauss reports she has been trying to incorporate spinach into pt's diet again, but thinks maybe she should get some more.            OBJECTIVE    Recent labs: (last 7 days)     20  1617   INR 2.53*       ASSESSMENT / PLAN  INR assessment THER    Recheck INR In: 4 WEEKS    INR Location Clinic      Anticoagulation Summary  As of 2020    INR goal:   1.5-2.5   TTR:   81.0 % (1 y)   INR used for dosin.53! (2020)   Warfarin maintenance plan:   7.5 mg (5 mg x 1.5) every Thu; 5 mg (5 mg x 1) all other days   Full warfarin instructions:   : 2.5 mg; Otherwise 7.5 mg every Thu; 5 mg all other days   Weekly warfarin total:   37.5 mg   Plan last modified:   Sary Parisi, RN (3/5/2020)   Next INR check:   10/19/2020   Priority:   Maintenance   Target end date:   Indefinite    Indications    Atrial fibrillation (H) [I48.91] [I48.91]  Long-term (current) use of anticoagulants [Z79.01] [Z79.01]  Deep vein thrombosis (DVT) (H) [I82.409]  Atrial fibrillation  unspecified type (H) [I48.91]             Anticoagulation Episode Summary     INR check location:       Preferred lab:       Send INR reminders to:   [facility]    Comments:   Patient contact number: [phone] Hx of Falls/Bleed  Can leave results with Rica (friend)  Restarted Warfarin due to DVT.       Anticoagulation Care Providers     Provider Role Specialty Phone number    Frida Desai MD Referring Cardiology [phone]            See the Encounter Report to view Anticoagulation Flowsheet and Dosing Calendar (Go to Encounters tab in chart review, and find the Anticoagulation Therapy  Visit)    Spoke with JOSSY Zuniga RN

## 2020-09-25 ENCOUNTER — ANTICOAGULATION THERAPY VISIT (OUTPATIENT)
Dept: ANTICOAGULATION | Facility: CLINIC | Age: 85
End: 2020-09-25

## 2020-09-25 DIAGNOSIS — Z79.01 LONG TERM CURRENT USE OF ANTICOAGULANT THERAPY: ICD-10-CM

## 2020-09-25 DIAGNOSIS — I82.409 DEEP VEIN THROMBOSIS (DVT) (H): ICD-10-CM

## 2020-09-25 DIAGNOSIS — I48.91 ATRIAL FIBRILLATION, UNSPECIFIED TYPE (H): ICD-10-CM

## 2020-09-25 NOTE — PROGRESS NOTES
Rica calls to report that patient is starting on a course of Augmentin.  Writer and Rica discuss that [facility] clinic worry about this if GI upset happens.  No change to warfarin dosing at this time.  Rica indicates that pharmacist recommended an INR check next week versus waiting 2 weeks in between INR draws.  Rica will call back if patient develops diarrhea.   
no

## 2020-09-29 ENCOUNTER — TELEPHONE (OUTPATIENT)
Dept: ANTICOAGULATION | Facility: CLINIC | Age: 85
End: 2020-09-29

## 2020-09-29 DIAGNOSIS — I82.409 DEEP VEIN THROMBOSIS (DVT) (H): ICD-10-CM

## 2020-09-29 DIAGNOSIS — Z79.01 LONG TERM CURRENT USE OF ANTICOAGULANT THERAPY: ICD-10-CM

## 2020-09-29 DIAGNOSIS — I48.91 ATRIAL FIBRILLATION, UNSPECIFIED TYPE (H): ICD-10-CM

## 2020-09-29 NOTE — TELEPHONE ENCOUNTER
9/29/20 Rica calling.  Sha will be seeing an NP tomorrow at the [facility]. Made appt for next INR.  Reviewed pain medication.  Sha will be taking 1500mg or less of Tylenol for discomfort today.  Please review with Rica how much he took on 9/30.    Ciro Bajwa RN

## 2020-09-30 ENCOUNTER — OFFICE VISIT (OUTPATIENT)
Dept: FAMILY MEDICINE | Facility: CLINIC | Age: 85
End: 2020-09-30
Payer: COMMERCIAL

## 2020-09-30 ENCOUNTER — ANTICOAGULATION THERAPY VISIT (OUTPATIENT)
Dept: ANTICOAGULATION | Facility: CLINIC | Age: 85
End: 2020-09-30

## 2020-09-30 VITALS
DIASTOLIC BLOOD PRESSURE: 75 MMHG | OXYGEN SATURATION: 100 % | TEMPERATURE: 97.4 F | SYSTOLIC BLOOD PRESSURE: 123 MMHG | HEART RATE: 69 BPM

## 2020-09-30 DIAGNOSIS — I82.409 DEEP VEIN THROMBOSIS (DVT) (H): ICD-10-CM

## 2020-09-30 DIAGNOSIS — L08.9 LOCAL INFECTION OF SKIN AND SUBCUTANEOUS TISSUE: Primary | ICD-10-CM

## 2020-09-30 DIAGNOSIS — I48.91 ATRIAL FIBRILLATION, UNSPECIFIED TYPE (H): ICD-10-CM

## 2020-09-30 DIAGNOSIS — Z79.01 LONG TERM CURRENT USE OF ANTICOAGULANT THERAPY: ICD-10-CM

## 2020-09-30 LAB — INR PPP: 2.7 (ref 0.86–1.14)

## 2020-09-30 RX ORDER — CLINDAMYCIN HCL 300 MG
300 CAPSULE ORAL 3 TIMES DAILY
Qty: 9 CAPSULE | Refills: 0 | Status: SHIPPED | OUTPATIENT
Start: 2020-09-30 | End: 2020-10-03

## 2020-09-30 RX ORDER — CLINDAMYCIN HCL 300 MG
CAPSULE ORAL
COMMUNITY
Start: 2020-09-27 | End: 2020-09-30

## 2020-09-30 ASSESSMENT — ENCOUNTER SYMPTOMS
CHILLS: 0
FATIGUE: 0
POOR WOUND HEALING: 1
FEVER: 0

## 2020-09-30 ASSESSMENT — PAIN SCALES - GENERAL: PAINLEVEL: NO PAIN (0)

## 2020-09-30 NOTE — PROGRESS NOTES
ANTICOAGULATION FOLLOW-UP CLINIC VISIT    Patient Name:  Noe Florence  Date:  2020  Contact Type:  Telephone    SUBJECTIVE:  Patient Findings     Positives:   Change in medications (Pt taking tylenol PRN)             OBJECTIVE    Recent labs: (last 7 days)     20  1544   INR 2.70*       ASSESSMENT / PLAN  INR assessment SUPRA    Recheck INR In: 4 WEEKS    INR Location Clinic      Anticoagulation Summary  As of 2020    INR goal:   1.5-2.5   TTR:   79.8 % (1 y)   INR used for dosin.70! (2020)   Warfarin maintenance plan:   5 mg (5 mg x 1) every day   Full warfarin instructions:   : 2.5 mg; Otherwise 5 mg every day   Weekly warfarin total:   35 mg   Plan last modified:   Nguyen Zuniga RN (2020)   Next INR check:   10/28/2020   Priority:   Maintenance   Target end date:   Indefinite    Indications    Atrial fibrillation (H) [I48.91] [I48.91]  Long-term (current) use of anticoagulants [Z79.01] [Z79.01]  Deep vein thrombosis (DVT) (H) [I82.409]  Atrial fibrillation  unspecified type (H) [I48.91]             Anticoagulation Episode Summary     INR check location:       Preferred lab:       Send INR reminders to:   [facility]    Comments:   Patient contact number: [phone] Hx of Falls/Bleed  Can leave results with Rica (friend)  Restarted Warfarin due to DVT.       Anticoagulation Care Providers     Provider Role Specialty Phone number    Frida Desai MD Referring Cardiology [phone]            See the Encounter Report to view Anticoagulation Flowsheet and Dosing Calendar (Go to Encounters tab in chart review, and find the Anticoagulation Therapy Visit)  Left message for patient with results and dosing recommendations. Asked patient to call back to report any missed doses, falls, signs and symptoms of bleeding or clotting, any changes in health, medication, or diet. Asked patient to call back with any questions or concerns.      Leena Abebe RN

## 2020-09-30 NOTE — PROGRESS NOTES
HPI       Noe Florence is a 85 year old man who presents for follow up on the abscess in his left axilla. Sha has been seen at  three times in the past week; 09/27, 09/28 and 09/30.   Chief Complaint   Patient presents with     Urgent Care     Follow up.      Sha is taking Clindamycin 3 times daily. He is also taking a probiotic twice daily. Family was packing the wound; however now, the area is dressed with a large bandaid. His significant other reports that this area continues to have pus present and does slightly bleed at times. On 09/25 aerobic culture was completed and showed MRSA.     Problem, Medication and Allergy Lists were reviewed and updated if needed.  Patient is an established patient of this clinic.           Review of Systems:   Review of Systems     Constitutional:  Negative for fever, chills and fatigue.   Skin:  Positive for poor wound healing and poor wound healing.               Physical Exam:     Vitals:    09/30/20 1553   BP: 123/75   Pulse: 69   Temp: 97.4  F (36.3  C)   SpO2: 100%     There is no height or weight on file to calculate BMI.  Vitals were reviewed and were normal.     Physical Exam  Vitals signs reviewed.   Constitutional:       Appearance: Normal appearance.   HENT:      Head: Normocephalic.   Musculoskeletal: Normal range of motion.   Skin:     General: Skin is warm and dry.      Findings: Lesion present.             Comments: Scabbed lesion in left axilla. It does look like the scab is loose, however the surrounding skin is CDI, no redness of surrounding skin or purulent discharge.    Neurological:      General: No focal deficit present.      Mental Status: He is alert and oriented to person, place, and time.   Psychiatric:         Mood and Affect: Mood normal.         Behavior: Behavior normal.           Results:   No labs ordered.   Assessment and Plan     1. Local infection of skin and subcutaneous tissue    - clindamycin (CLEOCIN) 300 MG capsule; Take 1  capsule (300 mg) by mouth 3 times daily for 3 days  Dispense: 9 capsule; Refill: 0      There are no discontinued medications.  Patient instructions: First, please continue the Clindamycin through Sunday till your appt on Monday, 10/05. Next, please call if you have concerns or develop diarrhea. Thank you.   Options for treatment and follow-up care were reviewed with the patient. Noe Florence  engaged in the decision making process and verbalized understanding of the options discussed and agreed with the final plan.  Malinda Manning, MARIA LUISA, CNP

## 2020-09-30 NOTE — PATIENT INSTRUCTIONS
First, please continue the Clindamycin through Sunday till you appt on Monday. Next, please call if you have concerns or develop diarrhea. Thank you.   [facility] Medication Refill Request Information:  * Please contact your pharmacy regarding ANY request for medication refills.  ** NP Clinic Prescription Fax = [phone]  * Please allow 3 business days for routine medication refills.  * Please allow 5 business days for controlled substance medication refills.     [facility] Test Result notification information:  *You will be notified with in 7-10 days of your appointment day regarding the results of your test.  If you are on MyChart you will be notified as soon as the provider has reviewed the results and signed off on them.    [facility]: [phone]

## 2020-09-30 NOTE — NURSING NOTE
Chief Complaint   Patient presents with     Urgent Care     Follow up.      Jaye Meredith, ELIJAH 4:30 PM  9/30/2020

## 2020-10-01 ENCOUNTER — TELEPHONE (OUTPATIENT)
Dept: ANTICOAGULATION | Facility: CLINIC | Age: 85
End: 2020-10-01

## 2020-10-01 DIAGNOSIS — I48.91 ATRIAL FIBRILLATION, UNSPECIFIED TYPE (H): ICD-10-CM

## 2020-10-01 DIAGNOSIS — Z79.01 LONG TERM CURRENT USE OF ANTICOAGULANT THERAPY: ICD-10-CM

## 2020-10-01 DIAGNOSIS — I82.409 DEEP VEIN THROMBOSIS (DVT) (H): ICD-10-CM

## 2020-10-01 NOTE — TELEPHONE ENCOUNTER
10/1/20 Rica bullock.  Sha has had recent changes to health and medication.  Made appt for INR at [facility] lab on Monday at 4:45PM.  BYRON Adame

## 2020-10-02 ENCOUNTER — ANCILLARY PROCEDURE (OUTPATIENT)
Dept: GENERAL RADIOLOGY | Facility: CLINIC | Age: 85
End: 2020-10-02
Attending: FAMILY MEDICINE
Payer: COMMERCIAL

## 2020-10-02 ENCOUNTER — OFFICE VISIT (OUTPATIENT)
Dept: ORTHOPEDICS | Facility: CLINIC | Age: 85
End: 2020-10-02
Payer: COMMERCIAL

## 2020-10-02 DIAGNOSIS — M79.604 RIGHT LEG PAIN: Primary | ICD-10-CM

## 2020-10-02 DIAGNOSIS — M79.604 RIGHT LEG PAIN: ICD-10-CM

## 2020-10-02 PROCEDURE — 72100 X-RAY EXAM L-S SPINE 2/3 VWS: CPT | Performed by: RADIOLOGY

## 2020-10-02 PROCEDURE — 99203 OFFICE O/P NEW LOW 30 MIN: CPT | Performed by: FAMILY MEDICINE

## 2020-10-02 RX ORDER — TIZANIDINE 2 MG/1
2-4 TABLET ORAL 3 TIMES DAILY PRN
Qty: 30 TABLET | Refills: 0 | Status: SHIPPED | OUTPATIENT
Start: 2020-10-02 | End: 2022-11-26

## 2020-10-02 NOTE — PROGRESS NOTES
SPORTS & ORTHOPEDIC WALK-IN VISIT 10/2/2020    Primary Care Physician: Dr. Sarmiento    Here today for right thigh discomfort.  9/27/20 - Woke up and significant other noticed that he was ambulating differently and was having trouble with certain motions.  Reports a very painful tightness and burning pain in the quadriceps distribution from the knee to the lateral hip. Pain and discomfort will fluctuate.  Presented to urgent care earlier this week and suggested muscular concerns and advised some stretching.  Currently his only symptom is a pulling sensation with passive knee flexion.  No symptoms radiating past the knee.  No back pain.  No tingling or numbness.    Prior history of quadriceps rupture 15+ years ago.    Reason for visit:     What part of your body is injured / painful?  right knee ad R hip    What caused the injury /pain? No inciting event     How long ago did your injury occur or pain begin? several days ago    What are your most bothersome symptoms? Pain, some swelling    How would you characterize your symptom?  aching and dull    What makes your symptoms better? Rest, Ice and Heat    What makes your symptoms worse? Standing, Walking and Movement    Have you been previously seen for this problem? Yes, UC    Medical History:    Any recent changes to your medical history? No    Any new medication prescribed since last visit? No    Have you had surgery on this body part before? Yes Many years ago had R quadriceps repair    Review of Systems:    Do you have fever, chills, weight loss? No    Do you have any vision problems? No    Do you have any chest pain or edema? No    Do you have any shortness of breath or wheezing?  No    Do you have stomach problems? Occasional constipation, also with strong antibiotic    Do you have any numbness or focal weakness? Yes, R leg weakness    Do you have diabetes? No    Do you have problems with bleeding or clotting? warfarin    Do you have an rashes or other skin  lesions? No       Past Medical History, Current Medications, and Allergies are reviewed in the electronic medical record as appropriate.       EXAM:There were no vitals taken for this visit.    General: Alert, pleasant, no distress  Right lower extremity: Warm and well-perfused.  Sensation is intact light touch.  Normal gait.  He has some pain with passive flexion of the knee in the anterior thigh.  Range of motion is symmetric at the knee and hip.  Straight leg raise is negative.  Knee extensors are intact.    Imagin view x-rays of the lumbar spine are performed and reviewed independently demonstrating significant multilevel degenerative changes with severe disc loss asymmetric on the right side particularly L3-L5.  No acute fracture is noted.  See EMR for formal radiology report.      Assessment: Patient is a 85 year old male with right anterior leg pain that is now nearly completely resolved.  Only symptoms on examination today are pain with passive stretching which makes evaluation of symptoms previously in the week difficult.  However after reviewing his lumbar spine suspect that this may be radicular in nature.    Recommendations:   Reviewed imaging and assessment with the patient in detail  Have recommended physical therapy focusing on the lumbar spine though may benefit from rehabilitation to the upper thigh musculature as well.  Have also provided with a prescription for tizanidine in the case of symptom recurrence.  This is to be used with caution given his age and baseline mobility issues.  Follow-up PRN.      Benjamin Fairchild MD

## 2020-10-05 ENCOUNTER — OFFICE VISIT (OUTPATIENT)
Dept: FAMILY MEDICINE | Facility: CLINIC | Age: 85
End: 2020-10-05
Payer: COMMERCIAL

## 2020-10-05 ENCOUNTER — ANTICOAGULATION THERAPY VISIT (OUTPATIENT)
Dept: ANTICOAGULATION | Facility: CLINIC | Age: 85
End: 2020-10-05

## 2020-10-05 VITALS
SYSTOLIC BLOOD PRESSURE: 150 MMHG | DIASTOLIC BLOOD PRESSURE: 83 MMHG | OXYGEN SATURATION: 98 % | TEMPERATURE: 97.4 F | HEART RATE: 80 BPM

## 2020-10-05 DIAGNOSIS — Z79.01 LONG TERM CURRENT USE OF ANTICOAGULANT THERAPY: ICD-10-CM

## 2020-10-05 DIAGNOSIS — Z51.89 VISIT FOR WOUND CHECK: ICD-10-CM

## 2020-10-05 DIAGNOSIS — I48.91 ATRIAL FIBRILLATION, UNSPECIFIED TYPE (H): ICD-10-CM

## 2020-10-05 DIAGNOSIS — I82.409 DEEP VEIN THROMBOSIS (DVT) (H): ICD-10-CM

## 2020-10-05 DIAGNOSIS — A49.02 MRSA INFECTION: Primary | ICD-10-CM

## 2020-10-05 LAB — INR PPP: 2.34 (ref 0.86–1.14)

## 2020-10-05 PROCEDURE — 99213 OFFICE O/P EST LOW 20 MIN: CPT | Performed by: NURSE PRACTITIONER

## 2020-10-05 PROCEDURE — 36415 COLL VENOUS BLD VENIPUNCTURE: CPT | Performed by: PATHOLOGY

## 2020-10-05 PROCEDURE — 85610 PROTHROMBIN TIME: CPT | Performed by: PATHOLOGY

## 2020-10-05 ASSESSMENT — PAIN SCALES - GENERAL: PAINLEVEL: NO PAIN (0)

## 2020-10-05 NOTE — PROGRESS NOTES
"       HPI       Noe Florence is a 85 year old man who presents for follow up on the skin infection of his left axilla.   Chief Complaint   Patient presents with     Wound Check     Follow up on wound.     Sha reports that he is \"doing just fine\" today. He presents with his significant other for follow up. He is completing the extra 3 days of Clindamycin that was added to his antibiotic treatment. He is tolerating the antibiotic well, no diarrhea reported. He is taking an oral probiotic. The scab that was present covering this abscess in his left axilla \"fell off\" 2 days ago. The area remains tender. Sha and his significant other are cleansing this area twice daily and applying a bandage.     Problem, Medication and Allergy Lists were reviewed and updated if needed.    Patient is an established patient of this clinic.           Review of Systems:   Review of Systems     Constitutional:  Negative for fever, chills and fatigue.   Skin:  Positive for poor wound healing and poor wound healing.               Physical Exam:     Vitals:    10/05/20 1547   BP: (!) 150/83   Pulse: 80   Temp: 97.4  F (36.3  C)   SpO2: 98%     There is no height or weight on file to calculate BMI.  Vitals were reviewed and were normal     Physical Exam  Vitals signs reviewed.   Constitutional:       Appearance: Normal appearance.   HENT:      Head: Normocephalic.   Skin:     General: Skin is warm and dry.      Findings: Lesion present.      Comments: 2 cm wide by 1cm long area in left axilla. No drainage noted. Wound edges are CDI. Surrounding skin is not red or warm.    Neurological:      General: No focal deficit present.      Mental Status: He is alert and oriented to person, place, and time.   Psychiatric:         Mood and Affect: Mood normal.         Behavior: Behavior normal.           Results:     No diagnostics ordered today.     Assessment and Plan     1. MRSA infection      2. Visit for wound check    There are no discontinued " medications.  This area appears to be healing well, granulation tissue present, no s/s infection. First, apply bacitracin with bandage for the next 2 days. Next, its ok to wash left axilla in shower and pat dry. Ok to stop covering with bandage once the area is closed. Thank you. Options for treatment and follow-up care were reviewed with the patient. Noe Florence  engaged in the decision making process and verbalized understanding of the options discussed and agreed with the final plan.  Malinda Manning, MARIA LUISA, CNP

## 2020-10-05 NOTE — PROGRESS NOTES
ANTICOAGULATION FOLLOW-UP CLINIC VISIT    Patient Name:  Noe Florence  Date:  10/5/2020  Contact Type:  Telephone    SUBJECTIVE:  Patient Findings     Positives:  Change in medications (continues on clindamycin for 3 more days (until 10/8))    Comments:  Spoke with Rica.  She reports no new changes in health or diet.          Clinical Outcomes     Comments:  Spoke with Rica.  She reports no new changes in health or diet.             OBJECTIVE    Recent labs: (last 7 days)     10/05/20  1636   INR 2.34*       ASSESSMENT / PLAN  INR assessment THER    Recheck INR In: 2 WEEKS    INR Location Clinic      Anticoagulation Summary  As of 10/5/2020    INR goal:  1.5-2.5   TTR:  79.0 % (1 y)   INR used for dosin.34 (10/5/2020)   Warfarin maintenance plan:  2.5 mg (5 mg x 0.5) every Wed; 5 mg (5 mg x 1) all other days   Full warfarin instructions:  2.5 mg every Wed; 5 mg all other days   Weekly warfarin total:  32.5 mg   Plan last modified:  Danya Edwards RN (10/5/2020)   Next INR check:  10/21/2020   Priority:  Maintenance   Target end date:  Indefinite    Indications    Atrial fibrillation (H) [I48.91] [I48.91]  Long-term (current) use of anticoagulants [Z79.01] [Z79.01]  Deep vein thrombosis (DVT) (H) [I82.409]  Atrial fibrillation  unspecified type (H) [I48.91]             Anticoagulation Episode Summary     INR check location:      Preferred lab:      Send INR reminders to:  [facility]    Comments:  Patient contact number: [phone] Hx of Falls/Bleed  Can leave results with Rica (friend)  Restarted Warfarin due to DVT.       Anticoagulation Care Providers     Provider Role Specialty Phone number    Frida Desai MD Referring Cardiology [phone]            See the Encounter Report to view Anticoagulation Flowsheet and Dosing Calendar (Go to Encounters tab in chart review, and find the Anticoagulation Therapy Visit)    Spoke with Rica.      Danya Edwards, RN

## 2020-10-05 NOTE — NURSING NOTE
Chief Complaint   Patient presents with     Wound Check     Follow up on wound.     Jaye Meredith, CHRISSIE 3:54 PM  10/5/2020

## 2020-10-05 NOTE — PATIENT INSTRUCTIONS
First, apply bacitracin with bandage for the next 2 days. Next, its ok to wash left axilla in shower and pat dry. Ok to stop covering with bandage once the area is closed. Thank you.   [facility] Medication Refill Request Information:  * Please contact your pharmacy regarding ANY request for medication refills.  ** NP Clinic Prescription Fax = [phone]  * Please allow 3 business days for routine medication refills.  * Please allow 5 business days for controlled substance medication refills.     [facility] Test Result notification information:  *You will be notified with in 7-10 days of your appointment day regarding the results of your test.  If you are on MyChart you will be notified as soon as the provider has reviewed the results and signed off on them.    [facility]: [phone]

## 2020-10-06 ASSESSMENT — ENCOUNTER SYMPTOMS
FEVER: 0
POOR WOUND HEALING: 1
CHILLS: 0
FATIGUE: 0

## 2020-10-09 ENCOUNTER — ANCILLARY PROCEDURE (OUTPATIENT)
Dept: CARDIOLOGY | Facility: CLINIC | Age: 85
End: 2020-10-09
Attending: INTERNAL MEDICINE
Payer: COMMERCIAL

## 2020-10-09 DIAGNOSIS — I47.20 VENTRICULAR TACHYARRHYTHMIA (H): ICD-10-CM

## 2020-10-09 DIAGNOSIS — Z95.810 ICD (IMPLANTABLE CARDIOVERTER-DEFIBRILLATOR) IN PLACE: ICD-10-CM

## 2020-10-09 DIAGNOSIS — I48.0 PAROXYSMAL ATRIAL FIBRILLATION (H): ICD-10-CM

## 2020-10-09 PROCEDURE — 93296 REM INTERROG EVL PM/IDS: CPT

## 2020-10-09 PROCEDURE — 93295 DEV INTERROG REMOTE 1/2/MLT: CPT | Performed by: INTERNAL MEDICINE

## 2020-10-14 ENCOUNTER — OFFICE VISIT (OUTPATIENT)
Dept: FAMILY MEDICINE | Facility: CLINIC | Age: 85
End: 2020-10-14
Payer: COMMERCIAL

## 2020-10-14 VITALS
HEART RATE: 77 BPM | DIASTOLIC BLOOD PRESSURE: 70 MMHG | SYSTOLIC BLOOD PRESSURE: 148 MMHG | OXYGEN SATURATION: 99 % | TEMPERATURE: 98 F

## 2020-10-14 DIAGNOSIS — B95.62 INFECTION OF SKIN DUE TO METHICILLIN RESISTANT STAPHYLOCOCCUS AUREUS (MRSA): Primary | ICD-10-CM

## 2020-10-14 DIAGNOSIS — L08.9 INFECTION OF SKIN DUE TO METHICILLIN RESISTANT STAPHYLOCOCCUS AUREUS (MRSA): Primary | ICD-10-CM

## 2020-10-14 DIAGNOSIS — Z51.89 WOUND CHECK, ABSCESS: ICD-10-CM

## 2020-10-14 LAB
MDC_IDC_EPISODE_DTM: NORMAL
MDC_IDC_EPISODE_DURATION: 81 S
MDC_IDC_EPISODE_ID: 64
MDC_IDC_EPISODE_TYPE: NORMAL
MDC_IDC_LEAD_IMPLANT_DT: NORMAL
MDC_IDC_LEAD_IMPLANT_DT: NORMAL
MDC_IDC_LEAD_LOCATION: NORMAL
MDC_IDC_LEAD_LOCATION: NORMAL
MDC_IDC_LEAD_LOCATION_DETAIL_1: NORMAL
MDC_IDC_LEAD_LOCATION_DETAIL_1: NORMAL
MDC_IDC_LEAD_MFG: NORMAL
MDC_IDC_LEAD_MFG: NORMAL
MDC_IDC_LEAD_MODEL: NORMAL
MDC_IDC_LEAD_MODEL: NORMAL
MDC_IDC_LEAD_POLARITY_TYPE: NORMAL
MDC_IDC_LEAD_POLARITY_TYPE: NORMAL
MDC_IDC_LEAD_SERIAL: NORMAL
MDC_IDC_LEAD_SERIAL: NORMAL
MDC_IDC_MSMT_BATTERY_DTM: NORMAL
MDC_IDC_MSMT_BATTERY_REMAINING_LONGEVITY: 99 MO
MDC_IDC_MSMT_BATTERY_RRT_TRIGGER: 2.73
MDC_IDC_MSMT_BATTERY_STATUS: NORMAL
MDC_IDC_MSMT_BATTERY_VOLTAGE: 3 V
MDC_IDC_MSMT_CAP_CHARGE_DTM: NORMAL
MDC_IDC_MSMT_CAP_CHARGE_ENERGY: 18 J
MDC_IDC_MSMT_CAP_CHARGE_TIME: 3.82
MDC_IDC_MSMT_CAP_CHARGE_TYPE: NORMAL
MDC_IDC_MSMT_LEADCHNL_RA_IMPEDANCE_VALUE: 456 OHM
MDC_IDC_MSMT_LEADCHNL_RA_PACING_THRESHOLD_AMPLITUDE: 0.62 V
MDC_IDC_MSMT_LEADCHNL_RA_PACING_THRESHOLD_PULSEWIDTH: 0.4 MS
MDC_IDC_MSMT_LEADCHNL_RA_SENSING_INTR_AMPL: 0.88 MV
MDC_IDC_MSMT_LEADCHNL_RA_SENSING_INTR_AMPL: 0.88 MV
MDC_IDC_MSMT_LEADCHNL_RV_IMPEDANCE_VALUE: 285 OHM
MDC_IDC_MSMT_LEADCHNL_RV_IMPEDANCE_VALUE: 361 OHM
MDC_IDC_MSMT_LEADCHNL_RV_PACING_THRESHOLD_AMPLITUDE: 0.88 V
MDC_IDC_MSMT_LEADCHNL_RV_PACING_THRESHOLD_PULSEWIDTH: 0.4 MS
MDC_IDC_MSMT_LEADCHNL_RV_SENSING_INTR_AMPL: 8.25 MV
MDC_IDC_MSMT_LEADCHNL_RV_SENSING_INTR_AMPL: 8.25 MV
MDC_IDC_PG_IMPLANT_DTM: NORMAL
MDC_IDC_PG_MFG: NORMAL
MDC_IDC_PG_MODEL: NORMAL
MDC_IDC_PG_SERIAL: NORMAL
MDC_IDC_PG_TYPE: NORMAL
MDC_IDC_SESS_CLINIC_NAME: NORMAL
MDC_IDC_SESS_DTM: NORMAL
MDC_IDC_SESS_TYPE: NORMAL
MDC_IDC_SET_BRADY_AT_MODE_SWITCH_RATE: 171 {BEATS}/MIN
MDC_IDC_SET_BRADY_HYSTRATE: NORMAL
MDC_IDC_SET_BRADY_LOWRATE: 60 {BEATS}/MIN
MDC_IDC_SET_BRADY_MAX_SENSOR_RATE: 130 {BEATS}/MIN
MDC_IDC_SET_BRADY_MAX_TRACKING_RATE: 130 {BEATS}/MIN
MDC_IDC_SET_BRADY_MODE: NORMAL
MDC_IDC_SET_BRADY_PAV_DELAY_LOW: 180 MS
MDC_IDC_SET_BRADY_SAV_DELAY_LOW: 150 MS
MDC_IDC_SET_LEADCHNL_RA_PACING_AMPLITUDE: 1.5 V
MDC_IDC_SET_LEADCHNL_RA_PACING_ANODE_ELECTRODE_1: NORMAL
MDC_IDC_SET_LEADCHNL_RA_PACING_ANODE_LOCATION_1: NORMAL
MDC_IDC_SET_LEADCHNL_RA_PACING_CAPTURE_MODE: NORMAL
MDC_IDC_SET_LEADCHNL_RA_PACING_CATHODE_ELECTRODE_1: NORMAL
MDC_IDC_SET_LEADCHNL_RA_PACING_CATHODE_LOCATION_1: NORMAL
MDC_IDC_SET_LEADCHNL_RA_PACING_POLARITY: NORMAL
MDC_IDC_SET_LEADCHNL_RA_PACING_PULSEWIDTH: 0.4 MS
MDC_IDC_SET_LEADCHNL_RA_SENSING_ANODE_ELECTRODE_1: NORMAL
MDC_IDC_SET_LEADCHNL_RA_SENSING_ANODE_LOCATION_1: NORMAL
MDC_IDC_SET_LEADCHNL_RA_SENSING_CATHODE_ELECTRODE_1: NORMAL
MDC_IDC_SET_LEADCHNL_RA_SENSING_CATHODE_LOCATION_1: NORMAL
MDC_IDC_SET_LEADCHNL_RA_SENSING_POLARITY: NORMAL
MDC_IDC_SET_LEADCHNL_RA_SENSING_SENSITIVITY: 0.3 MV
MDC_IDC_SET_LEADCHNL_RV_PACING_AMPLITUDE: 2 V
MDC_IDC_SET_LEADCHNL_RV_PACING_ANODE_ELECTRODE_1: NORMAL
MDC_IDC_SET_LEADCHNL_RV_PACING_ANODE_LOCATION_1: NORMAL
MDC_IDC_SET_LEADCHNL_RV_PACING_CAPTURE_MODE: NORMAL
MDC_IDC_SET_LEADCHNL_RV_PACING_CATHODE_ELECTRODE_1: NORMAL
MDC_IDC_SET_LEADCHNL_RV_PACING_CATHODE_LOCATION_1: NORMAL
MDC_IDC_SET_LEADCHNL_RV_PACING_POLARITY: NORMAL
MDC_IDC_SET_LEADCHNL_RV_PACING_PULSEWIDTH: 0.4 MS
MDC_IDC_SET_LEADCHNL_RV_SENSING_ANODE_ELECTRODE_1: NORMAL
MDC_IDC_SET_LEADCHNL_RV_SENSING_ANODE_LOCATION_1: NORMAL
MDC_IDC_SET_LEADCHNL_RV_SENSING_CATHODE_ELECTRODE_1: NORMAL
MDC_IDC_SET_LEADCHNL_RV_SENSING_CATHODE_LOCATION_1: NORMAL
MDC_IDC_SET_LEADCHNL_RV_SENSING_POLARITY: NORMAL
MDC_IDC_SET_LEADCHNL_RV_SENSING_SENSITIVITY: 0.45 MV
MDC_IDC_SET_ZONE_DETECTION_BEATS_DENOMINATOR: 24 {BEATS}
MDC_IDC_SET_ZONE_DETECTION_BEATS_NUMERATOR: 18 {BEATS}
MDC_IDC_SET_ZONE_DETECTION_INTERVAL: 300 MS
MDC_IDC_SET_ZONE_DETECTION_INTERVAL: 320 MS
MDC_IDC_SET_ZONE_DETECTION_INTERVAL: 350 MS
MDC_IDC_SET_ZONE_DETECTION_INTERVAL: 430 MS
MDC_IDC_SET_ZONE_DETECTION_INTERVAL: NORMAL
MDC_IDC_SET_ZONE_TYPE: NORMAL
MDC_IDC_STAT_AT_BURDEN_PERCENT: 0 %
MDC_IDC_STAT_AT_DTM_END: NORMAL
MDC_IDC_STAT_AT_DTM_START: NORMAL
MDC_IDC_STAT_BRADY_AP_VP_PERCENT: 38.63 %
MDC_IDC_STAT_BRADY_AP_VS_PERCENT: 34.94 %
MDC_IDC_STAT_BRADY_AS_VP_PERCENT: 7.32 %
MDC_IDC_STAT_BRADY_AS_VS_PERCENT: 19.1 %
MDC_IDC_STAT_BRADY_DTM_END: NORMAL
MDC_IDC_STAT_BRADY_DTM_START: NORMAL
MDC_IDC_STAT_BRADY_RA_PERCENT_PACED: 67.77 %
MDC_IDC_STAT_BRADY_RV_PERCENT_PACED: 45.23 %
MDC_IDC_STAT_EPISODE_RECENT_COUNT: 0
MDC_IDC_STAT_EPISODE_RECENT_COUNT: 1
MDC_IDC_STAT_EPISODE_RECENT_COUNT_DTM_END: NORMAL
MDC_IDC_STAT_EPISODE_RECENT_COUNT_DTM_START: NORMAL
MDC_IDC_STAT_EPISODE_TOTAL_COUNT: 0
MDC_IDC_STAT_EPISODE_TOTAL_COUNT: 4
MDC_IDC_STAT_EPISODE_TOTAL_COUNT: 60
MDC_IDC_STAT_EPISODE_TOTAL_COUNT_DTM_END: NORMAL
MDC_IDC_STAT_EPISODE_TOTAL_COUNT_DTM_START: NORMAL
MDC_IDC_STAT_EPISODE_TYPE: NORMAL
MDC_IDC_STAT_TACHYTHERAPY_ATP_DELIVERED_RECENT: 0
MDC_IDC_STAT_TACHYTHERAPY_ATP_DELIVERED_TOTAL: 0
MDC_IDC_STAT_TACHYTHERAPY_RECENT_DTM_END: NORMAL
MDC_IDC_STAT_TACHYTHERAPY_RECENT_DTM_START: NORMAL
MDC_IDC_STAT_TACHYTHERAPY_SHOCKS_ABORTED_RECENT: 0
MDC_IDC_STAT_TACHYTHERAPY_SHOCKS_ABORTED_TOTAL: 0
MDC_IDC_STAT_TACHYTHERAPY_SHOCKS_DELIVERED_RECENT: 0
MDC_IDC_STAT_TACHYTHERAPY_SHOCKS_DELIVERED_TOTAL: 0
MDC_IDC_STAT_TACHYTHERAPY_TOTAL_DTM_END: NORMAL
MDC_IDC_STAT_TACHYTHERAPY_TOTAL_DTM_START: NORMAL

## 2020-10-14 PROCEDURE — 99213 OFFICE O/P EST LOW 20 MIN: CPT | Performed by: NURSE PRACTITIONER

## 2020-10-14 ASSESSMENT — PAIN SCALES - GENERAL: PAINLEVEL: NO PAIN (0)

## 2020-10-14 NOTE — NURSING NOTE
Chief Complaint   Patient presents with     Recheck Medication     Follow up.     Jaye Meredith, ELIJAH 4:01 PM  10/14/2020

## 2020-10-17 ASSESSMENT — ENCOUNTER SYMPTOMS
FEVER: 0
FATIGUE: 0
CHILLS: 0

## 2020-10-17 NOTE — PROGRESS NOTES
HPI   Noe Florence is a 85 year old man who presents for follow up on his MRSA skin infection.   Chief Complaint   Patient presents with     Recheck Medication     Follow up.   First, his significant other has been continuing to wash this area daily and apply a bandaid. It is still not closed and she wants to ensure that it is healing properly.     Problem, Medication and Allergy Lists were reviewed and updated if needed.    Patient is an established patient of this clinic.         Review of Systems:   Review of Systems     Constitutional:  Negative for fever, chills and fatigue.               Physical Exam:     Vitals:    10/14/20 1552   BP: (!) 148/70   Pulse: 77   Temp: 98  F (36.7  C)   SpO2: 99%     There is no height or weight on file to calculate BMI.  Vitals were reviewed and were normal.     Physical Exam  Vitals signs reviewed.   Constitutional:       Appearance: Normal appearance.   HENT:      Head: Normocephalic.   Musculoskeletal: Normal range of motion.   Skin:     General: Skin is warm and dry.      Comments: Area in left axilla is closing, no drainage, no s/s infection of surrounding skin.    Neurological:      General: No focal deficit present.      Mental Status: He is alert and oriented to person, place, and time.   Psychiatric:         Mood and Affect: Mood normal.         Behavior: Behavior normal.           Results:     No diagnostics ordered today.     Assessment and Plan     1. Infection of skin due to methicillin resistant Staphylococcus aureus (MRSA)      2. Wound check, abscess      There are no discontinued medications.  First, I feel that Clives skin is healing well. I have seen progressive improvement with each visit. Next, Sha will return with any concerns or questions. No need to cleanse this area separately, ok to be in shower, pat dry and apply bandage as needed only. Options for treatment and follow-up care were reviewed with the patient. Noe Florence  engaged in the  decision making process and verbalized understanding of the options discussed and agreed with the final plan.  Malinda Manning, APRN, CNP

## 2020-10-17 NOTE — PATIENT INSTRUCTIONS

## 2020-10-21 ENCOUNTER — ANTICOAGULATION THERAPY VISIT (OUTPATIENT)
Dept: ANTICOAGULATION | Facility: CLINIC | Age: 85
End: 2020-10-21

## 2020-10-21 DIAGNOSIS — I82.409 DEEP VEIN THROMBOSIS (DVT) (H): ICD-10-CM

## 2020-10-21 DIAGNOSIS — I48.91 ATRIAL FIBRILLATION, UNSPECIFIED TYPE (H): ICD-10-CM

## 2020-10-21 DIAGNOSIS — Z79.01 LONG TERM CURRENT USE OF ANTICOAGULANT THERAPY: ICD-10-CM

## 2020-10-21 LAB — INR PPP: 1.9 (ref 0.86–1.14)

## 2020-10-21 PROCEDURE — 36415 COLL VENOUS BLD VENIPUNCTURE: CPT | Performed by: PATHOLOGY

## 2020-10-21 PROCEDURE — 85610 PROTHROMBIN TIME: CPT | Performed by: PATHOLOGY

## 2020-10-21 NOTE — PROGRESS NOTES
Addendum 10/21/20  Rica calls back to report she has kept patient on 5mg daily for the past week.  Writer instructed patient to continue with 5mg daily.             ANTICOAGULATION FOLLOW-UP CLINIC VISIT    Patient Name:  Noe Florence  Date:  10/21/2020  Contact Type:  Telephone    SUBJECTIVE:         OBJECTIVE    Recent labs: (last 7 days)     10/21/20  1620   INR 1.90*       ASSESSMENT / PLAN  No question data found.  Anticoagulation Summary  As of 10/21/2020    INR goal:  1.5-2.5   TTR:  79.0 % (1 y)   INR used for dosin.90 (10/21/2020)   Warfarin maintenance plan:  2.5 mg (5 mg x 0.5) every Wed; 5 mg (5 mg x 1) all other days   Full warfarin instructions:  2.5 mg every Wed; 5 mg all other days   Weekly warfarin total:  32.5 mg   No change documented:  Roya Manning RN   Plan last modified:  Danya Edwards RN (10/5/2020)   Next INR check:  2020   Priority:  Maintenance   Target end date:  Indefinite    Indications    Atrial fibrillation (H) [I48.91] [I48.91]  Long-term (current) use of anticoagulants [Z79.01] [Z79.01]  Deep vein thrombosis (DVT) (H) [I82.409]  Atrial fibrillation  unspecified type (H) [I48.91]             Anticoagulation Episode Summary     INR check location:      Preferred lab:      Send INR reminders to:  [facility]    Comments:  Patient contact number: [phone] Hx of Falls/Bleed  Can leave results with Rica (friend)  Restarted Warfarin due to DVT.       Anticoagulation Care Providers     Provider Role Specialty Phone number    Frida Desai MD Referring Cardiology [phone]            See the Encounter Report to view Anticoagulation Flowsheet and Dosing Calendar (Go to Encounters tab in chart review, and find the Anticoagulation Therapy Visit)    Left message for patient with results and dosing recommendations. Asked patient to call back to report any missed doses, falls, signs and symptoms of bleeding or clotting, any changes in health,  medication, or diet. Asked patient to call back with any questions or concerns.      Roya Manning RN

## 2020-10-27 ENCOUNTER — OFFICE VISIT (OUTPATIENT)
Dept: DERMATOLOGY | Facility: CLINIC | Age: 85
End: 2020-10-27
Payer: COMMERCIAL

## 2020-10-27 DIAGNOSIS — B35.3 TINEA PEDIS OF BOTH FEET: ICD-10-CM

## 2020-10-27 DIAGNOSIS — L82.0 INFLAMED SEBORRHEIC KERATOSIS: ICD-10-CM

## 2020-10-27 DIAGNOSIS — D48.5 NEOPLASM OF UNCERTAIN BEHAVIOR OF SKIN: Primary | ICD-10-CM

## 2020-10-27 DIAGNOSIS — L82.1 SEBORRHEIC KERATOSIS: ICD-10-CM

## 2020-10-27 PROCEDURE — 11102 TANGNTL BX SKIN SINGLE LES: CPT | Mod: 59 | Performed by: DERMATOLOGY

## 2020-10-27 PROCEDURE — 99203 OFFICE O/P NEW LOW 30 MIN: CPT | Mod: 25 | Performed by: DERMATOLOGY

## 2020-10-27 PROCEDURE — 17110 DESTRUCTION B9 LES UP TO 14: CPT | Mod: GC | Performed by: DERMATOLOGY

## 2020-10-27 PROCEDURE — 88305 TISSUE EXAM BY PATHOLOGIST: CPT | Performed by: DERMATOLOGY

## 2020-10-27 RX ORDER — KETOCONAZOLE 20 MG/G
CREAM TOPICAL
Qty: 60 G | Refills: 3 | Status: SHIPPED | OUTPATIENT
Start: 2020-10-27 | End: 2021-05-03

## 2020-10-27 ASSESSMENT — PAIN SCALES - GENERAL: PAINLEVEL: NO PAIN (0)

## 2020-10-27 NOTE — LETTER
10/27/2020       RE: Noe Florence  [address] [facility] [address]     Dear Colleague,    Thank you for referring your patient, Noe Florence, to the [facility] DERMATOLOGY [facility] [place] at [facility]. Please see a copy of my visit note below.    [facility] Dermatology Note      Dermatology Problem List:  # NUB, L parietal scalp   - s/p shave biopsy 10/27/20  # Innumerable seborrheic keratoses   - cryotherapy 10/27/30  # History of tinea pedis (10/2020), corporis (8/2013, 2015), and cruris (8/2013)  - current: ketoconazole 2% cream BID to feet   - prior: terbinafine cream   # Stasis dermatitis    Encounter Date: Oct 27, 2020    CC:   Chief Complaint   Patient presents with     Skin Check     Bill is here today for a skinc heck with multiple concerns      History of Present Illness:  Mr. Noe Florence is a 85 year old male who presents in self referral for several concerns.     Accompanied by wife today.  First, there are many seborrheic keratoses on the body and they would like to have these treated with cryotherapy today.  Secondly note that there was a rash on the chest and they would like to make sure that that is addressed. Third,  there is a rash on the feet and they are wondering what they can use for treatment.  Fourth there is a dark spot on the left side of the scalp that they would like to have evaluated.  None of the lesions are particularly bothersome.  Denies any lesions that are tender, painful, bleeding, or itchy.  Follows with podiatry for foot care.    Past Medical History:   Patient Active Problem List   Diagnosis     Rotator cuff (capsule) sprain     Other postprocedural status(V45.89)     Hyperkalemia     Anemia     Diarrhea     Diverticulitis of colon     Hypertension     CAD (coronary artery disease)     NSVT (nonsustained ventricular tachycardia) (H)     NSTEMI (non-ST elevated myocardial infarction) (H)      Automatic implantable cardioverter-defibrillator - Medtronic dual chamber ICD - Not Dependent     Skin exam, screening for cancer     Tinea cruris     Tinea corporis     Colon cancer (H)     Polymyalgia rheumatica (H)     S/P ablation of atrial flutter     Primary open angle glaucoma     Atrial fibrillation (H) [I48.91]     Long-term (current) use of anticoagulants [Z79.01]     Ischemic cardiomyopathy     CKD (chronic kidney disease), stage III     Weight loss, unintentional     Skin infection     Tinea pedis of both feet     Venous stasis dermatitis of both lower extremities     Actinic keratosis     Inflamed seborrheic keratosis     Closed left subtrochanteric femur fracture (H)     Hip fracture (H)     Other osteoporosis without current pathological fracture     Deep vein thrombosis (DVT) (H)     Other closed nondisplaced fracture of distal end of humerus, unspecified laterality, initial encounter     Syncope and collapse     Atrial fibrillation, unspecified type (H)     Past Medical History:   Diagnosis Date     Advanced open-angle glaucoma      Atrial fibrillation (H)      CKD (chronic kidney disease), stage III 2005     Colon cancer (H)     Stage II-B colon cancer     Coronary artery disease     s/p CABG x 2, JEREMY x 2     Diverticulitis      Hyperlipidemia      Hypertension      Ischemic cardiomyopathy      MGUS (monoclonal gammopathy of unknown significance)      Nonsenile cataract     BE     Osteoporosis     left hip fracture     Polymorphic ventricular tachycardia (H)      Polymyalgia rheumatica (H)      PVD (posterior vitreous detachment), both eyes 2005     S/P ablation of atrial flutter 6/20/14    CTI     Stented coronary artery      SVT (supraventricular tachycardia) (H)     PPM/AICD for NSVT     Upper leg DVT (deep venous thromboembolism), chronic (H)     Left     Weight loss, unintentional 2017    15 lb in 4 months     Past Surgical History:   Procedure Laterality Date     C CABG, ARTERY-VEIN, FOUR   2006    LIMA-LAD, SVG-Rt PDA, SVG-OM2, SVG-Diag 1     C CABG, VEIN, SINGLE  1994    1-vessel CABG, SVG->PDRCA      CATARACT IOL, RT/LT Bilateral      COLONOSCOPY  3/13/2014    Procedure: COMBINED COLONOSCOPY, SINGLE BIOPSY/POLYPECTOMY BY BIOPSY;;  Surgeon: Mary Gerber MD;  Location: [facility]     COLONOSCOPY N/A 6/22/2015    Procedure: COLONOSCOPY;  Surgeon: Marilin Newman MD;  Location: [facility]     COLONOSCOPY N/A 11/7/2018    Procedure: COMBINED COLONOSCOPY, SINGLE OR MULTIPLE BIOPSY/POLYPECTOMY BY BIOPSY;  Surgeon: Roberto Esteban MD;  [facility]     EP ICD GENERATOR CHANGE DUAL N/A 12/11/2018    Procedure: EP ICD Generator Change Dual;  Surgeon: Deedee Baird MD;  Location:  HEART CARDIAC CATH LAB     H ABLATION ATRIAL FLUTTER       HEART CATH DRUG ELUTING STENT PLACEMENT  4/19/2012    JEREMY x 2 to LCx     IMPLANT IMPLANTABLE CARDIOVERTER DEFIBRILLATOR  5-    ppm/aicd     KNEE SURGERY      right and left knee surgeries     LAPAROSCOPIC ASSISTED COLECTOMY Right 2/1/2019    Procedure: Laparoscopic Converted to Open Transverse Colectomy with Lysis of Adhesions;  Surgeon: Chanelle Guzmán MD;  [facility]     LAPAROSCOPIC ASSISTED COLECTOMY LEFT (DESCENDING)  4/8/2014    Procedure: LAPAROSCOPIC ASSISTED COLECTOMY LEFT (DESCENDING);  Hand assisted Laparoscopic Sigmoid Colectomy , Laparoscopic mobilization of spleenic flexure *Latex Allergy*Anesthesia General with Block;  Surgeon: Chanelle Guzmán MD;  [facility]     OPEN REDUCTION INTERNAL FIXATION RODDING INTRAMEDULLAR FEMUR FRACTURE TABLE Left 3/14/2019    Procedure: Open Reduction Internal Fixation Left Femur Intramedullar Nailing;  Surgeon: Srikanth Avalos MD;  Location: [facility]     REPAIR VALVE MITRAL  2006     30-mm Medtronic Julian ring      SELECTIVE LASER TRABECULOPLASTY (SLT) OD (RIGHT EYE)  4/10, 1/12,+1/9/13    RE     SELECTIVE LASER TRABECULOPLASTY (SLT) OS (LEFT EYE)  5/2012      "SHOULDER SURGERY      right rotator cuff     Social History:  Patient reports that he quit smoking about 52 years ago. His smoking use included cigarettes and cigars. He has never used smokeless tobacco. He reports current alcohol use. He reports that he does not use drugs.    Family History:  Family History   Problem Relation Age of Onset     C.A.D. Father      Anesthesia Reaction No family hx of      Crohn's Disease No family hx of      Ulcerative Colitis No family hx of      Cancer - colorectal No family hx of      Macular Degeneration No family hx of      Cancer No family hx of         No known family hx of skin cancer     Melanoma No family hx of      Skin Cancer No family hx of        Medications:  Current Outpatient Medications   Medication Sig Dispense Refill     alendronate (FOSAMAX) 70 MG tablet Take 1 tablet (70 mg) by mouth every 7 days Take with a full glass of water and do not eat or lay down for 30 minutes 12 tablet 3     ARIPiprazole (ABILIFY) 2 MG tablet Take 2 mg by mouth daily       atorvastatin (LIPITOR) 40 MG tablet Take 1 tablet (40 mg) by mouth daily 90 tablet 3     calcium carbonate 500 mg, elemental, (OSCAL;OYSTER SHELL CALCIUM) 500 MG tablet Take 1 tablet (500 mg) by mouth 2 times daily 180 tablet 3     cholecalciferol 1000 units TABS Take 1,000 Units by mouth daily        COMPRESSION STOCKINGS 1 each daily 1 each 4     Cyanocobalamin (VITAMIN B-12 CR PO)        ferrous sulfate (FE TABS) 325 (65 Fe) MG EC tablet Take 325 mg by mouth daily       metoprolol tartrate (LOPRESSOR) 25 MG tablet TAKE 1 TABLET BY MOUTH TWO TIMES A  tablet 1     mirtazapine (REMERON) 15 MG tablet Take 15 mg by mouth At Bedtime.       Multiple Vitamins-Minerals (MULTIVITAMIN ADULT PO)        polyethylene glycol (MIRALAX) packet Take 238 g by mouth See Admin Instructions Start at 4 pm night prior to colonoscopy. Refer to \"Getting Ready for Colonoscopy\" instructions. 238 g 0     sertraline (ZOLOFT) 100 MG tablet " Take 150 mg by mouth every evening        tamsulosin (FLOMAX) 0.4 MG capsule Take 2 capsules (0.8 mg) by mouth daily 180 capsule 2     tiZANidine (ZANAFLEX) 2 MG tablet Take 1-2 tablets (2-4 mg) by mouth 3 times daily as needed for muscle spasms 30 tablet 0     warfarin ANTICOAGULANT (COUMADIN) 5 MG tablet Take 1 to 1.5 tablets daily or as directed by the Coumadin clinic 90 tablet 5        Allergies   Allergen Reactions     Latex Rash     Rash     Review of Systems:  -Constitutional: Otherwise feeling well today, in usual state of health.  -Skin: As above in HPI. No additional skin concerns    Physical exam:  Vitals: There were no vitals taken for this visit.  GEN: This is a well developed, well-nourished male in no acute distress, in a pleasant mood.    SKIN: Total skin excluding the undergarment areas was performed. The exam included the head/face, neck, both arms, chest, back, abdomen, both legs, digits and/or nails.   -Lugo skin type: II  -L parietal sclap dark brown somewhat waxy papule with an area at the lateral edge that is much darker as well as some focal areas of erythema within the lesion  -Innumerable waxy stuck on papules on the trunk and extremities  -Bilateral feet in a moccasin distribution with pink scaly coalescing patches    Impression/Plan:  1. Neoplasm of uncertain behavior on the L parietal scalp. The differential diagnosis includes seborrheic keratoses vs LM vs LMM.   - Shave biopsy:  After discussion of benefits and risks including but not limited to bleeding/bruising, pain/swelling, infection, scar, incomplete removal, nerve damage/numbness, recurrence, and non-diagnostic biopsy, written consent, verbal consent and photographs were obtained. Time-out was performed. The area was cleaned with isopropyl alcohol. 0.5ml of 1% lidocaine was injected to obtain adequate anesthesia. A shave biopsy was performed. Hemostasis was achieved with aluminium chloride. Vaseline and a sterile dressing  were applied. The patient tolerated the procedure and no complications were noted. The patient was provided with verbal and written post care instructions.    2. Innumerable seborrheic keratoses   Bothersome(pruritic or irritated by clothing)  lesions treated with cryotherapy today per patient request.   - Cryotherapy procedure note: After verbal consent and discussion of risks and benefits including but no limited to dyspigmentation/scar, blister, and pain, 10 lesions was(were) treated with 1-2mm freeze border for 2 cycles with liquid nitrogen. Post cryotherapy instructions were provided.    3. Tinea pedis, bilateral feet  Treatment options reviewed.   - start ketoconazole 2% cream BID until cleared then 3-4 times a week for maintenance     Follow-up in 3 months, earlier for new or changing lesions.     Dr. Jackson staffed the patient.    Staff Involved:  Erika Yap MD ([email or web address])/Staff    Patient was seen and examined with the dermatology resident. I agree with the history, review of systems, physical examination, assessments and plan. I was present for the key portion of the biopsy procedure and for the entire cryotherapy procedure.    Lashawn Jackson MD  Professor and  Chair  Department of Dermatology  [facility]

## 2020-10-27 NOTE — NURSING NOTE
Dermatology Rooming Note    Noe Florence's goals for this visit include:   Chief Complaint   Patient presents with     Skin Check     Bill is here today for a skinc heck with multiple concerns      ELIJAH Minor

## 2020-10-27 NOTE — PATIENT INSTRUCTIONS
Apply ketoconazole cream to the feet 2 times a day until the rash goes away and then at least 3-4 times a week for maintenance.     Apply a good moisturizer such as vanicream or cerave head to toe 2 times a day.     Wound Care After a Biopsy    What is a skin biopsy?  A skin biopsy allows the doctor to examine a very small piece of tissue under the microscope to determine the diagnosis and the best treatment for the skin condition. A local anesthetic (numbing medicine)  is injected with a very small needle into the skin area to be tested. A small piece of skin is taken from the area. Sometimes a suture (stitch) is used.     What are the risks of a skin biopsy?  I will experience scar, bleeding, swelling, pain, crusting and redness. I may experience incomplete removal or recurrence. Risks of this procedure are excessive bleeding, bruising, infection, nerve damage, numbness, thick (hypertrophic or keloidal) scar and non-diagnostic biopsy.    How should I care for my wound for the first 24 hours?    Keep the wound dry and covered for 24 hours    If it bleeds, hold direct pressure on the area for 15 minutes. If bleeding does not stop then go to the emergency room    Avoid strenuous exercise the first 1-2 days or as your doctor instructs you    How should I care for the wound after 24 hours?    After 24 hours, remove the bandage    You may bathe or shower as normal    If you had a scalp biopsy, you can shampoo as usual and can use shower water to clean the biopsy site daily    Clean the wound twice a day with gentle soap and water    Do not scrub, be gentle    Apply white petroleum/Vaseline after cleaning the wound with a cotton swab or a clean finger, and keep the site covered with a Bandaid /bandage. Bandages are not necessary with a scalp biopsy    If you are unable to cover the site with a Bandaid /bandage, re-apply ointment 2-3 times a day to keep the site moist. Moisture will help with healing    Avoid strenuous  activity for first 1-2 days    Avoid lakes, rivers, pools, and oceans until the stitches are removed or the site is healed    How do I clean my wound?    Wash hands thoroughly with soap or use hand  before all wound care    Clean the wound with gentle soap and water    Apply white petroleum/Vaseline  to wound after it is clean    Replace the Bandaid /bandage to keep the wound covered for the first few days or as instructed by your doctor    If you had a scalp biopsy, warm shower water to the area on a daily basis should suffice    What should I use to clean my wound?     Cotton-tipped applicators (Qtips )    White petroleum jelly (Vaseline ). Use a clean new container and use Q-tips to apply.    Bandaids   as needed    Gentle soap     How should I care for my wound long term?    Do not get your wound dirty    Keep up with wound care for one week or until the area is healed.    A small scab will form and fall off by itself when the area is completely healed. The area will be red and will become pink in color as it heals. Sun protection is very important for how your scar will turn out. Sunscreen with an SPF 30 or greater is recommended once the area is healed.    You should have some soreness but it should be mild and slowly go away over several days. Talk to your doctor about using tylenol for pain,    When should I call my doctor?  If you have increased:     Pain or swelling    Pus or drainage (clear or slightly yellow drainage is ok)    Temperature over 100F    Spreading redness or warmth around wound    When will I hear about my results?  The biopsy results can take 2-3 weeks to come back. The clinic will call you with the results, send you a Augur message, or have you schedule a follow-up clinic or phone time to discuss the results. Contact our clinics if you do not hear from us in 3 weeks.     Who should I call with questions?    [facility]: [phone]      [facility]: [phone]    For urgent needs outside of business hours call the [facility] at [phone] and ask for the dermatology resident on call    Cryotherapy    What is it?    Use of a very cold liquid, such as liquid nitrogen, to freeze and destroy abnormal skin cells that need to be removed    What should I expect?    Tenderness and redness    A small blister that might grow and fill with dark purple blood. There may be crusting.    More than one treatment may be needed if the lesions do not go away.    How do I care for the treated area?    Gently wash the area with your hands when bathing.    Use a thin layer of Vaseline to help with healing. You may use a Band-Aid.     The area should heal within 7-10 days and may leave behind a pink or lighter color.     Do not use an antibiotic or Neosporin ointment.     You may take acetaminophen (Tylenol) for pain.     Call your Doctor if you have:    Severe pain    Signs of infection (warmth, redness, cloudy yellow drainage, and or a bad smell)    Questions or concerns

## 2020-10-27 NOTE — PROGRESS NOTES
[facility] Dermatology Note      Dermatology Problem List:  # NUB, L parietal scalp   - s/p shave biopsy 10/27/20  # Innumerable seborrheic keratoses   - cryotherapy 10/27/30  # History of tinea pedis (10/2020), corporis (8/2013, 2015), and cruris (8/2013)  - current: ketoconazole 2% cream BID to feet   - prior: terbinafine cream   # Stasis dermatitis    Encounter Date: Oct 27, 2020    CC:   Chief Complaint   Patient presents with     Skin Check     Bill is here today for a skinc heck with multiple concerns      History of Present Illness:  Mr. Noe Florence is a 85 year old male who presents in self referral for several concerns.     Accompanied by wife today.  First, there are many seborrheic keratoses on the body and they would like to have these treated with cryotherapy today.  Secondly note that there was a rash on the chest and they would like to make sure that that is addressed. Third,  there is a rash on the feet and they are wondering what they can use for treatment.  Fourth there is a dark spot on the left side of the scalp that they would like to have evaluated.  None of the lesions are particularly bothersome.  Denies any lesions that are tender, painful, bleeding, or itchy.  Follows with podiatry for foot care.    Past Medical History:   Patient Active Problem List   Diagnosis     Rotator cuff (capsule) sprain     Other postprocedural status(V45.89)     Hyperkalemia     Anemia     Diarrhea     Diverticulitis of colon     Hypertension     CAD (coronary artery disease)     NSVT (nonsustained ventricular tachycardia) (H)     NSTEMI (non-ST elevated myocardial infarction) (H)     Automatic implantable cardioverter-defibrillator - Medtronic dual chamber ICD - Not Dependent     Skin exam, screening for cancer     Tinea cruris     Tinea corporis     Colon cancer (H)     Polymyalgia rheumatica (H)     S/P ablation of atrial flutter     Primary open angle glaucoma     Atrial fibrillation  (H) [I48.91]     Long-term (current) use of anticoagulants [Z79.01]     Ischemic cardiomyopathy     CKD (chronic kidney disease), stage III     Weight loss, unintentional     Skin infection     Tinea pedis of both feet     Venous stasis dermatitis of both lower extremities     Actinic keratosis     Inflamed seborrheic keratosis     Closed left subtrochanteric femur fracture (H)     Hip fracture (H)     Other osteoporosis without current pathological fracture     Deep vein thrombosis (DVT) (H)     Other closed nondisplaced fracture of distal end of humerus, unspecified laterality, initial encounter     Syncope and collapse     Atrial fibrillation, unspecified type (H)     Past Medical History:   Diagnosis Date     Advanced open-angle glaucoma      Atrial fibrillation (H)      CKD (chronic kidney disease), stage III 2005     Colon cancer (H)     Stage II-B colon cancer     Coronary artery disease     s/p CABG x 2, JEREMY x 2     Diverticulitis      Hyperlipidemia      Hypertension      Ischemic cardiomyopathy      MGUS (monoclonal gammopathy of unknown significance)      Nonsenile cataract     BE     Osteoporosis     left hip fracture     Polymorphic ventricular tachycardia (H)      Polymyalgia rheumatica (H)      PVD (posterior vitreous detachment), both eyes 2005     S/P ablation of atrial flutter 6/20/14    CTI     Stented coronary artery      SVT (supraventricular tachycardia) (H)     PPM/AICD for NSVT     Upper leg DVT (deep venous thromboembolism), chronic (H)     Left     Weight loss, unintentional 2017    15 lb in 4 months     Past Surgical History:   Procedure Laterality Date     C CABG, ARTERY-VEIN, FOUR  2006    LIMA-LAD, SVG-Rt PDA, SVG-OM2, SVG-Diag 1     C CABG, VEIN, SINGLE  1994    1-vessel CABG, SVG->PDRCA      CATARACT IOL, RT/LT Bilateral      COLONOSCOPY  3/13/2014    Procedure: COMBINED COLONOSCOPY, SINGLE BIOPSY/POLYPECTOMY BY BIOPSY;;  Surgeon: Mary Gerber MD;  Location:  GI      COLONOSCOPY N/A 6/22/2015    Procedure: COLONOSCOPY;  Surgeon: Marilin Newman MD;  [facility]: [facility]     COLONOSCOPY N/A 11/7/2018    Procedure: COMBINED COLONOSCOPY, SINGLE OR MULTIPLE BIOPSY/POLYPECTOMY BY BIOPSY;  Surgeon: Roberto Esteban MD;  Location: [facility] GI     EP ICD GENERATOR CHANGE DUAL N/A 12/11/2018    Procedure: EP ICD Generator Change Dual;  Surgeon: Deedee Baird MD;  Location:  HEART CARDIAC CATH LAB     H ABLATION ATRIAL FLUTTER       HEART CATH DRUG ELUTING STENT PLACEMENT  4/19/2012    JEREMY x 2 to LCx     IMPLANT IMPLANTABLE CARDIOVERTER DEFIBRILLATOR  5-    ppm/aicd     KNEE SURGERY      right and left knee surgeries     LAPAROSCOPIC ASSISTED COLECTOMY Right 2/1/2019    Procedure: Laparoscopic Converted to Open Transverse Colectomy with Lysis of Adhesions;  Surgeon: Chanelle Guzmán MD;  [facility]     LAPAROSCOPIC ASSISTED COLECTOMY LEFT (DESCENDING)  4/8/2014    Procedure: LAPAROSCOPIC ASSISTED COLECTOMY LEFT (DESCENDING);  Hand assisted Laparoscopic Sigmoid Colectomy , Laparoscopic mobilization of spleenic flexure *Latex Allergy*Anesthesia General with Block;  Surgeon: Chanelle Guzmán MD;  [facility]     OPEN REDUCTION INTERNAL FIXATION RODDING INTRAMEDULLAR FEMUR FRACTURE TABLE Left 3/14/2019    Procedure: Open Reduction Internal Fixation Left Femur Intramedullar Nailing;  Surgeon: Srikanth Avalos MD;  Location:  OR     REPAIR VALVE MITRAL  2006     30-mm Medtronic Julian ring      SELECTIVE LASER TRABECULOPLASTY (SLT) OD (RIGHT EYE)  4/10, 1/12,+1/9/13    RE     SELECTIVE LASER TRABECULOPLASTY (SLT) OS (LEFT EYE)  5/2012     SHOULDER SURGERY      right rotator cuff     Social History:  Patient reports that he quit smoking about 52 years ago. His smoking use included cigarettes and cigars. He has never used smokeless tobacco. He reports current alcohol use. He reports that he does not use drugs.    Family History:  Family History  "  Problem Relation Age of Onset     C.A.D. Father      Anesthesia Reaction No family hx of      Crohn's Disease No family hx of      Ulcerative Colitis No family hx of      Cancer - colorectal No family hx of      Macular Degeneration No family hx of      Cancer No family hx of         No known family hx of skin cancer     Melanoma No family hx of      Skin Cancer No family hx of        Medications:  Current Outpatient Medications   Medication Sig Dispense Refill     alendronate (FOSAMAX) 70 MG tablet Take 1 tablet (70 mg) by mouth every 7 days Take with a full glass of water and do not eat or lay down for 30 minutes 12 tablet 3     ARIPiprazole (ABILIFY) 2 MG tablet Take 2 mg by mouth daily       atorvastatin (LIPITOR) 40 MG tablet Take 1 tablet (40 mg) by mouth daily 90 tablet 3     calcium carbonate 500 mg, elemental, (OSCAL;OYSTER SHELL CALCIUM) 500 MG tablet Take 1 tablet (500 mg) by mouth 2 times daily 180 tablet 3     cholecalciferol 1000 units TABS Take 1,000 Units by mouth daily        COMPRESSION STOCKINGS 1 each daily 1 each 4     Cyanocobalamin (VITAMIN B-12 CR PO)        ferrous sulfate (FE TABS) 325 (65 Fe) MG EC tablet Take 325 mg by mouth daily       metoprolol tartrate (LOPRESSOR) 25 MG tablet TAKE 1 TABLET BY MOUTH TWO TIMES A  tablet 1     mirtazapine (REMERON) 15 MG tablet Take 15 mg by mouth At Bedtime.       Multiple Vitamins-Minerals (MULTIVITAMIN ADULT PO)        polyethylene glycol (MIRALAX) packet Take 238 g by mouth See Admin Instructions Start at 4 pm night prior to colonoscopy. Refer to \"Getting Ready for Colonoscopy\" instructions. 238 g 0     sertraline (ZOLOFT) 100 MG tablet Take 150 mg by mouth every evening        tamsulosin (FLOMAX) 0.4 MG capsule Take 2 capsules (0.8 mg) by mouth daily 180 capsule 2     tiZANidine (ZANAFLEX) 2 MG tablet Take 1-2 tablets (2-4 mg) by mouth 3 times daily as needed for muscle spasms 30 tablet 0     warfarin ANTICOAGULANT (COUMADIN) 5 MG tablet " Take 1 to 1.5 tablets daily or as directed by the Coumadin clinic 90 tablet 5        Allergies   Allergen Reactions     Latex Rash     Rash     Review of Systems:  -Constitutional: Otherwise feeling well today, in usual state of health.  -Skin: As above in HPI. No additional skin concerns    Physical exam:  Vitals: There were no vitals taken for this visit.  GEN: This is a well developed, well-nourished male in no acute distress, in a pleasant mood.    SKIN: Total skin excluding the undergarment areas was performed. The exam included the head/face, neck, both arms, chest, back, abdomen, both legs, digits and/or nails.   -Lugo skin type: II  -L parietal sclap dark brown somewhat waxy papule with an area at the lateral edge that is much darker as well as some focal areas of erythema within the lesion  -Innumerable waxy stuck on papules on the trunk and extremities  -Bilateral feet in a moccasin distribution with pink scaly coalescing patches    Impression/Plan:  1. Neoplasm of uncertain behavior on the L parietal scalp. The differential diagnosis includes seborrheic keratoses vs LM vs LMM.   - Shave biopsy:  After discussion of benefits and risks including but not limited to bleeding/bruising, pain/swelling, infection, scar, incomplete removal, nerve damage/numbness, recurrence, and non-diagnostic biopsy, written consent, verbal consent and photographs were obtained. Time-out was performed. The area was cleaned with isopropyl alcohol. 0.5ml of 1% lidocaine was injected to obtain adequate anesthesia. A shave biopsy was performed. Hemostasis was achieved with aluminium chloride. Vaseline and a sterile dressing were applied. The patient tolerated the procedure and no complications were noted. The patient was provided with verbal and written post care instructions.    2. Innumerable seborrheic keratoses   Bothersome(pruritic or irritated by clothing)  lesions treated with cryotherapy today per patient request.   -  Cryotherapy procedure note: After verbal consent and discussion of risks and benefits including but no limited to dyspigmentation/scar, blister, and pain, 10 lesions was(were) treated with 1-2mm freeze border for 2 cycles with liquid nitrogen. Post cryotherapy instructions were provided.    3. Tinea pedis, bilateral feet  Treatment options reviewed.   - start ketoconazole 2% cream BID until cleared then 3-4 times a week for maintenance     Follow-up in 3 months, earlier for new or changing lesions.     Dr. Jackson staffed the patient.    Staff Involved:  Erika Yap MD ([id number])/Staff    Patient was seen and examined with the dermatology resident. I agree with the history, review of systems, physical examination, assessments and plan. I was present for the key portion of the biopsy procedure and for the entire cryotherapy procedure.    Lashawn Jackson MD  Professor and  Chair  Department of Dermatology  [facility]

## 2020-11-01 LAB — COPATH REPORT: NORMAL

## 2020-11-02 ENCOUNTER — TELEPHONE (OUTPATIENT)
Dept: CARDIOLOGY | Facility: CLINIC | Age: 85
End: 2020-11-02

## 2020-11-02 ENCOUNTER — VIRTUAL VISIT (OUTPATIENT)
Dept: CARDIOLOGY | Facility: CLINIC | Age: 85
End: 2020-11-02
Payer: COMMERCIAL

## 2020-11-02 DIAGNOSIS — I48.0 PAROXYSMAL ATRIAL FIBRILLATION (H): Primary | ICD-10-CM

## 2020-11-02 PROCEDURE — 99441 PR PHYSICIAN TELEPHONE EVALUATION 5-10 MIN: CPT | Mod: 95 | Performed by: INTERNAL MEDICINE

## 2020-11-02 NOTE — TELEPHONE ENCOUNTER
[facility] Call Center    Phone Message    May a detailed message be left on voicemail: yes     Reason for Call: Other: Pt wife calling to verify pts video visit at 11:30. She was expecting a call at 11 to be virtually roomed but has not heard anything yet. Please call back, thank you.     Action Taken: Message routed to:  [facility] ([facility]    Travel Screening: Not Applicable

## 2020-11-02 NOTE — LETTER
"11/2/2020      RE: Noe Florence  [address] [facility] [address]       Dear Colleague,    Thank you for the opportunity to participate in the care of your patient, Noe Florence, at the [facility] HEART [facility]. Please see a copy of my visit note below.    Noe Florence is a 85 year old male who is being evaluated via a billable telephone visit.      The patient has been notified of following:     \"This telephone visit will be conducted via a call between you and your physician/provider. We have found that certain health care needs can be provided without the need for a physical exam.  This service lets us provide the care you need with a short phone conversation.  If a prescription is necessary we can send it directly to your pharmacy.  If lab work is needed we can place an order for that and you can then stop by our lab to have the test done at a later time.    Telephone visits are billed at different rates depending on your insurance coverage. During this emergency period, for some insurers they may be billed the same as an in-person visit.  Please reach out to your insurance provider with any questions.    If during the course of the call the physician/provider feels a telephone visit is not appropriate, you will not be charged for this service.\"    Patient has given verbal consent for Telephone visit?  Yes    What phone number would you like to be contacted at? [phone]    How would you like to obtain your AVS? Artisofthart    Phone call duration: 8 minutes    Electrophysiology Clinic Telephone Visit    Noe Florence is a 85 year old male who is being evaluated via a billable telephone visit.      The patient has been notified of following:     \"This telephone visit will be conducted via a call between you and your physician/provider. We have found that certain health care needs can be provided without the need for a physical " "exam.  This service lets us provide the care you need with a short phone conversation.  If a prescription is necessary we can send it directly to your pharmacy.  If lab work is needed we can place an order for that and you can then stop by our lab to have the test done at a later time.    If during the course of the call the physician/provider feels a telephone visit is not appropriate, you will not be charged for this service.\"   Patient has given verbal consent for Telephone visit?  Yes    HPI: Purpose of visit: Follow-up of atrial fibrillation    The patient has a past medical history significant for  HTN, HLD, CKD3, CAD s/p CABG x2, DESx2, polymorphic VT s/p ICD placement (5/2012, gen change 12/2018), and AF/AFL s/p CTI (6/2014) and right atrial appendage atrial tachycardia ablation (9/2014). His ICD reached REBECCA and he underwent an ICD gen change on 12/11/2018.     Patient's last video visit with me was in April 2020 and at that time he was doing well.  Specifically, patient did not have any bleeding problems or any fall episodes.    In the last 6 months, patient has done very well.  Again, there have not been any bleeding problems or fall episodes.  Patient denies any symptoms of palpitations, exertional dyspnea, exertional angina, frequent lightheadedness, presyncope or syncope.      PAST MEDICAL HISTORY:  Past Medical History:   Diagnosis Date     Advanced open-angle glaucoma      Atrial fibrillation (H)      CKD (chronic kidney disease), stage III 2005     Colon cancer (H)     Stage II-B colon cancer     Coronary artery disease     s/p CABG x 2, JEREMY x 2     Diverticulitis      Hyperlipidemia      Hypertension      Ischemic cardiomyopathy      MGUS (monoclonal gammopathy of unknown significance)      Nonsenile cataract     BE     Osteoporosis     left hip fracture     Polymorphic ventricular tachycardia (H)      Polymyalgia rheumatica (H)      PVD (posterior vitreous detachment), both eyes 2005     S/P " ablation of atrial flutter 6/20/14    CTI     Stented coronary artery      SVT (supraventricular tachycardia) (H)     PPM/AICD for NSVT     Upper leg DVT (deep venous thromboembolism), chronic (H)     Left     Weight loss, unintentional 2017    15 lb in 4 months       CURRENT MEDICATIONS:  Current Outpatient Medications   Medication Sig Dispense Refill     alendronate (FOSAMAX) 70 MG tablet Take 1 tablet (70 mg) by mouth every 7 days Take with a full glass of water and do not eat or lay down for 30 minutes 12 tablet 3     ARIPiprazole (ABILIFY) 2 MG tablet Take 2 mg by mouth daily       atorvastatin (LIPITOR) 40 MG tablet Take 1 tablet (40 mg) by mouth daily 90 tablet 3     calcium carbonate 500 mg, elemental, (OSCAL;OYSTER SHELL CALCIUM) 500 MG tablet Take 1 tablet (500 mg) by mouth 2 times daily 180 tablet 3     cholecalciferol 1000 units TABS Take 1,000 Units by mouth daily        COMPRESSION STOCKINGS 1 each daily 1 each 4     Cyanocobalamin (VITAMIN B-12 CR PO)        ferrous sulfate (FE TABS) 325 (65 Fe) MG EC tablet Take 325 mg by mouth daily       ketoconazole (NIZORAL) 2 % external cream Apply to the feet 2 times a day until rash clears then 3-4 times a week for maintenance 60 g 3     metoprolol tartrate (LOPRESSOR) 25 MG tablet TAKE 1 TABLET BY MOUTH TWO TIMES A  tablet 1     mirtazapine (REMERON) 15 MG tablet Take 15 mg by mouth At Bedtime.       Multiple Vitamins-Minerals (MULTIVITAMIN ADULT PO)        sertraline (ZOLOFT) 100 MG tablet Take 150 mg by mouth every evening        tamsulosin (FLOMAX) 0.4 MG capsule Take 2 capsules (0.8 mg) by mouth daily 180 capsule 2     tiZANidine (ZANAFLEX) 2 MG tablet Take 1-2 tablets (2-4 mg) by mouth 3 times daily as needed for muscle spasms 30 tablet 0     warfarin ANTICOAGULANT (COUMADIN) 5 MG tablet Take 1 to 1.5 tablets daily or as directed by the Coumadin clinic 90 tablet 5       PAST SURGICAL HISTORY:  Past Surgical History:   Procedure Laterality Date      C CABG, ARTERY-VEIN, FOUR  2006    LIMA-LAD, SVG-Rt PDA, SVG-OM2, SVG-Diag 1     C CABG, VEIN, SINGLE  1994    1-vessel CABG, SVG->PDRCA      CATARACT IOL, RT/LT Bilateral      COLONOSCOPY  3/13/2014    Procedure: COMBINED COLONOSCOPY, SINGLE BIOPSY/POLYPECTOMY BY BIOPSY;;  Surgeon: Mary Gerber MD;  [facility]     COLONOSCOPY N/A 6/22/2015    Procedure: COLONOSCOPY;  Surgeon: Marilin Newman MD;  [facility]: [facility]     COLONOSCOPY N/A 11/7/2018    Procedure: COMBINED COLONOSCOPY, SINGLE OR MULTIPLE BIOPSY/POLYPECTOMY BY BIOPSY;  Surgeon: Roberto Esteban MD;  Location:  GI     EP ICD GENERATOR CHANGE DUAL N/A 12/11/2018    Procedure: EP ICD Generator Change Dual;  Surgeon: Deedee Baird MD;  Location:  HEART CARDIAC CATH LAB     H ABLATION ATRIAL FLUTTER       HEART CATH DRUG ELUTING STENT PLACEMENT  4/19/2012    JEREMY x 2 to LCx     IMPLANT IMPLANTABLE CARDIOVERTER DEFIBRILLATOR  5-    ppm/aicd     KNEE SURGERY      right and left knee surgeries     LAPAROSCOPIC ASSISTED COLECTOMY Right 2/1/2019    Procedure: Laparoscopic Converted to Open Transverse Colectomy with Lysis of Adhesions;  Surgeon: Chanelle Guzmán MD;  Location:  OR     LAPAROSCOPIC ASSISTED COLECTOMY LEFT (DESCENDING)  4/8/2014    Procedure: LAPAROSCOPIC ASSISTED COLECTOMY LEFT (DESCENDING);  Hand assisted Laparoscopic Sigmoid Colectomy , Laparoscopic mobilization of spleenic flexure *Latex Allergy*Anesthesia General with Block;  Surgeon: Chanelle Guzmán MD;  [facility]     OPEN REDUCTION INTERNAL FIXATION RODDING INTRAMEDULLAR FEMUR FRACTURE TABLE Left 3/14/2019    Procedure: Open Reduction Internal Fixation Left Femur Intramedullar Nailing;  Surgeon: Srikanth Avalos MD;  Location:  OR     REPAIR VALVE MITRAL  2006     30-mm Medtronic Julian ring      SELECTIVE LASER TRABECULOPLASTY (SLT) OD (RIGHT EYE)  4/10, 1/12,+1/9/13    RE     SELECTIVE LASER TRABECULOPLASTY (SLT) OS  (LEFT EYE)  2012     SHOULDER SURGERY      right rotator cuff       ALLERGIES:     Allergies   Allergen Reactions     Latex Rash     Rash       FAMILY HISTORY:  Family History   Problem Relation Age of Onset     C.A.D. Father      Anesthesia Reaction No family hx of      Crohn's Disease No family hx of      Ulcerative Colitis No family hx of      Cancer - colorectal No family hx of      Macular Degeneration No family hx of      Cancer No family hx of         No known family hx of skin cancer     Melanoma No family hx of      Skin Cancer No family hx of        SOCIAL HISTORY:  Social History     Tobacco Use     Smoking status: Former Smoker     Types: Cigarettes, Cigars     Quit date: 1968     Years since quittin.8     Smokeless tobacco: Never Used   Substance Use Topics     Alcohol use: Yes     Alcohol/week: 0.0 standard drinks     Comment: 2-3 drinks twice weekly     Drug use: No       ROS:  10 point ROS neg other than the symptoms noted above in the HPI.    Labs:  Reviewed.       Assessment and Plan:     Paroxysmal atrial fibrillation  Status post ICD     It is encouraging that patient has been doing very well from the standpoint of his atrial fibrillation.  We will see patient again in approximately 6 months by video visit.  There is no change in his current medications.    All questions and concerns were addressed and patient and wife are happy with plan.      Telephone Visit Duration: 8 minutes      Please do not hesitate to contact me if you have any questions/concerns.     Sincerely,     Deedee Baird MD

## 2020-11-02 NOTE — PATIENT INSTRUCTIONS
Thank you for coming to the [facility] @ [place] [facility]; please note the following instructions:    1. Please follow up with Dr. Baird in 6 months.        If you have any questions regarding your visit please contact your care team:     Cardiology  Telephone Number   Sendy AIKEN, RN  Sary VILLAFANA, RN   Rylee WANG, RMCHRISSIE GUNTER, RMA  Perri KING, LPN   [phone] (option 1)   For scheduling appts:     [phone] (select option 1)       For the Device Clinic (Pacemakers and ICD's)  RN's :  Mary Chavez   During business hours: [phone]    *After business hours:  [phone] (select option 4)      Normal test result notifications will be released via iQVCloud or mailed within 7 business days.  All other test results, will be communicated via telephone once reviewed by your cardiologist.    If you need a medication refill please contact your pharmacy.  Please allow 3 business days for your refill to be completed.    As always, thank you for trusting us with your health care needs!

## 2020-11-02 NOTE — PROGRESS NOTES
"Noe Florence is a 85 year old male who is being evaluated via a billable telephone visit.      The patient has been notified of following:     \"This telephone visit will be conducted via a call between you and your physician/provider. We have found that certain health care needs can be provided without the need for a physical exam.  This service lets us provide the care you need with a short phone conversation.  If a prescription is necessary we can send it directly to your pharmacy.  If lab work is needed we can place an order for that and you can then stop by our lab to have the test done at a later time.    Telephone visits are billed at different rates depending on your insurance coverage. During this emergency period, for some insurers they may be billed the same as an in-person visit.  Please reach out to your insurance provider with any questions.    If during the course of the call the physician/provider feels a telephone visit is not appropriate, you will not be charged for this service.\"    Patient has given verbal consent for Telephone visit?  Yes    What phone number would you like to be contacted at? [phone]    How would you like to obtain your AVS? [email or web address]    Phone call duration: 8 minutes    Electrophysiology Clinic Telephone Visit    Noe Florence is a 85 year old male who is being evaluated via a billable telephone visit.      The patient has been notified of following:     \"This telephone visit will be conducted via a call between you and your physician/provider. We have found that certain health care needs can be provided without the need for a physical exam.  This service lets us provide the care you need with a short phone conversation.  If a prescription is necessary we can send it directly to your pharmacy.  If lab work is needed we can place an order for that and you can then stop by our lab to have the test done at a later time.    If during the course of the call the " "physician/provider feels a telephone visit is not appropriate, you will not be charged for this service.\"   Patient has given verbal consent for Telephone visit?  Yes    HPI: Purpose of visit: Follow-up of atrial fibrillation    The patient has a past medical history significant for  HTN, HLD, CKD3, CAD s/p CABG x2, DESx2, polymorphic VT s/p ICD placement (5/2012, gen change 12/2018), and AF/AFL s/p CTI (6/2014) and right atrial appendage atrial tachycardia ablation (9/2014). His ICD reached REBECCA and he underwent an ICD gen change on 12/11/2018.     Patient's last video visit with me was in April 2020 and at that time he was doing well.  Specifically, patient did not have any bleeding problems or any fall episodes.    In the last 6 months, patient has done very well.  Again, there have not been any bleeding problems or fall episodes.  Patient denies any symptoms of palpitations, exertional dyspnea, exertional angina, frequent lightheadedness, presyncope or syncope.      PAST MEDICAL HISTORY:  Past Medical History:   Diagnosis Date     Advanced open-angle glaucoma      Atrial fibrillation (H)      CKD (chronic kidney disease), stage III 2005     Colon cancer (H)     Stage II-B colon cancer     Coronary artery disease     s/p CABG x 2, JEREMY x 2     Diverticulitis      Hyperlipidemia      Hypertension      Ischemic cardiomyopathy      MGUS (monoclonal gammopathy of unknown significance)      Nonsenile cataract     BE     Osteoporosis     left hip fracture     Polymorphic ventricular tachycardia (H)      Polymyalgia rheumatica (H)      PVD (posterior vitreous detachment), both eyes 2005     S/P ablation of atrial flutter 6/20/14    CTI     Stented coronary artery      SVT (supraventricular tachycardia) (H)     PPM/AICD for NSVT     Upper leg DVT (deep venous thromboembolism), chronic (H)     Left     Weight loss, unintentional 2017    15 lb in 4 months       CURRENT MEDICATIONS:  Current Outpatient Medications "   Medication Sig Dispense Refill     alendronate (FOSAMAX) 70 MG tablet Take 1 tablet (70 mg) by mouth every 7 days Take with a full glass of water and do not eat or lay down for 30 minutes 12 tablet 3     ARIPiprazole (ABILIFY) 2 MG tablet Take 2 mg by mouth daily       atorvastatin (LIPITOR) 40 MG tablet Take 1 tablet (40 mg) by mouth daily 90 tablet 3     calcium carbonate 500 mg, elemental, (OSCAL;OYSTER SHELL CALCIUM) 500 MG tablet Take 1 tablet (500 mg) by mouth 2 times daily 180 tablet 3     cholecalciferol 1000 units TABS Take 1,000 Units by mouth daily        COMPRESSION STOCKINGS 1 each daily 1 each 4     Cyanocobalamin (VITAMIN B-12 CR PO)        ferrous sulfate (FE TABS) 325 (65 Fe) MG EC tablet Take 325 mg by mouth daily       ketoconazole (NIZORAL) 2 % external cream Apply to the feet 2 times a day until rash clears then 3-4 times a week for maintenance 60 g 3     metoprolol tartrate (LOPRESSOR) 25 MG tablet TAKE 1 TABLET BY MOUTH TWO TIMES A  tablet 1     mirtazapine (REMERON) 15 MG tablet Take 15 mg by mouth At Bedtime.       Multiple Vitamins-Minerals (MULTIVITAMIN ADULT PO)        sertraline (ZOLOFT) 100 MG tablet Take 150 mg by mouth every evening        tamsulosin (FLOMAX) 0.4 MG capsule Take 2 capsules (0.8 mg) by mouth daily 180 capsule 2     tiZANidine (ZANAFLEX) 2 MG tablet Take 1-2 tablets (2-4 mg) by mouth 3 times daily as needed for muscle spasms 30 tablet 0     warfarin ANTICOAGULANT (COUMADIN) 5 MG tablet Take 1 to 1.5 tablets daily or as directed by the Coumadin clinic 90 tablet 5       PAST SURGICAL HISTORY:  Past Surgical History:   Procedure Laterality Date     C CABG, ARTERY-VEIN, FOUR  2006    LIMA-LAD, SVG-Rt PDA, SVG-OM2, SVG-Diag 1     C CABG, VEIN, SINGLE  1994    1-vessel CABG, SVG->PDRCA      CATARACT IOL, RT/LT Bilateral      COLONOSCOPY  3/13/2014    Procedure: COMBINED COLONOSCOPY, SINGLE BIOPSY/POLYPECTOMY BY BIOPSY;;  Surgeon: Mary Gerber MD;   Location: [facility]     COLONOSCOPY N/A 6/22/2015    Procedure: COLONOSCOPY;  Surgeon: Marilin Newman MD;  Location: [facility]     COLONOSCOPY N/A 11/7/2018    Procedure: COMBINED COLONOSCOPY, SINGLE OR MULTIPLE BIOPSY/POLYPECTOMY BY BIOPSY;  Surgeon: Roberto Esteban MD;  Location: [facility]     EP ICD GENERATOR CHANGE DUAL N/A 12/11/2018    Procedure: EP ICD Generator Change Dual;  Surgeon: Deedee Baird MD;  Location:  [facility]ARD[facility] CATH LAB     H ABLATION ATRIAL FLUTTER       HEART CATH DRUG ELUTING STENT PLACEMENT  4/19/2012    JEREMY x 2 to LCx     IMPLANT IMPLANTABLE CARDIOVERTER DEFIBRILLATOR  5-    ppm/aicd     KNEE SURGERY      right and left knee surgeries     LAPAROSCOPIC ASSISTED COLECTOMY Right 2/1/2019    Procedure: Laparoscopic Converted to Open Transverse Colectomy with Lysis of Adhesions;  Surgeon: Chanelle Guzmán MD;  Location: [facility]     LAPAROSCOPIC ASSISTED COLECTOMY LEFT (DESCENDING)  4/8/2014    Procedure: LAPAROSCOPIC ASSISTED COLECTOMY LEFT (DESCENDING);  Hand assisted Laparoscopic Sigmoid Colectomy , Laparoscopic mobilization of spleenic flexure *Latex Allergy*Anesthesia General with Block;  Surgeon: Chanelle Guzmán MD;  Location: [facility]     OPEN REDUCTION INTERNAL FIXATION RODDING INTRAMEDULLAR FEMUR FRACTURE TABLE Left 3/14/2019    Procedure: Open Reduction Internal Fixation Left Femur Intramedullar Nailing;  Surgeon: Srikanth Avalos MD;  Location: [facility]     REPAIR VALVE MITRAL  2006     30-mm Medtronic Julian ring      SELECTIVE LASER TRABECULOPLASTY (SLT) OD (RIGHT EYE)  4/10, 1/12,+1/9/13    RE     SELECTIVE LASER TRABECULOPLASTY (SLT) OS (LEFT EYE)  5/2012     SHOULDER SURGERY      right rotator cuff       ALLERGIES:     Allergies   Allergen Reactions     Latex Rash     Rash       FAMILY HISTORY:  Family History   Problem Relation Age of Onset     C.A.D. Father      Anesthesia Reaction No family hx of      Crohn's Disease No family hx of       Ulcerative Colitis No family hx of      Cancer - colorectal No family hx of      Macular Degeneration No family hx of      Cancer No family hx of         No known family hx of skin cancer     Melanoma No family hx of      Skin Cancer No family hx of        SOCIAL HISTORY:  Social History     Tobacco Use     Smoking status: Former Smoker     Types: Cigarettes, Cigars     Quit date: 1968     Years since quittin.8     Smokeless tobacco: Never Used   Substance Use Topics     Alcohol use: Yes     Alcohol/week: 0.0 standard drinks     Comment: 2-3 drinks twice weekly     Drug use: No       ROS:  10 point ROS neg other than the symptoms noted above in the HPI.    Labs:  Reviewed.       Assessment and Plan:     Paroxysmal atrial fibrillation  Status post ICD     It is encouraging that patient has been doing very well from the standpoint of his atrial fibrillation.  We will see patient again in approximately 6 months by video visit.  There is no change in his current medications.    All questions and concerns were addressed and patient and wife are happy with plan.        Telephone Visit Duration: 8 minutes          CC  SELF, REFERRED

## 2020-11-23 ENCOUNTER — ANTICOAGULATION THERAPY VISIT (OUTPATIENT)
Dept: ANTICOAGULATION | Facility: CLINIC | Age: 85
End: 2020-11-23

## 2020-11-23 ENCOUNTER — OFFICE VISIT (OUTPATIENT)
Dept: INTERNAL MEDICINE | Facility: CLINIC | Age: 85
End: 2020-11-23
Payer: COMMERCIAL

## 2020-11-23 VITALS
WEIGHT: 145.9 LBS | HEART RATE: 67 BPM | SYSTOLIC BLOOD PRESSURE: 117 MMHG | DIASTOLIC BLOOD PRESSURE: 68 MMHG | BODY MASS INDEX: 22.85 KG/M2 | OXYGEN SATURATION: 96 %

## 2020-11-23 DIAGNOSIS — I82.409 DEEP VEIN THROMBOSIS (DVT) (H): ICD-10-CM

## 2020-11-23 DIAGNOSIS — Z79.01 LONG TERM CURRENT USE OF ANTICOAGULANT THERAPY: ICD-10-CM

## 2020-11-23 DIAGNOSIS — C18.9 MALIGNANT NEOPLASM OF COLON, UNSPECIFIED PART OF COLON (H): ICD-10-CM

## 2020-11-23 DIAGNOSIS — R53.81 PHYSICAL DECONDITIONING: Primary | ICD-10-CM

## 2020-11-23 DIAGNOSIS — I48.91 ATRIAL FIBRILLATION, UNSPECIFIED TYPE (H): ICD-10-CM

## 2020-11-23 DIAGNOSIS — M62.81 MUSCLE WEAKNESS (GENERALIZED): ICD-10-CM

## 2020-11-23 DIAGNOSIS — I82.409 DVT (DEEP VENOUS THROMBOSIS) (H): ICD-10-CM

## 2020-11-23 DIAGNOSIS — I48.20 CHRONIC ATRIAL FIBRILLATION (H): ICD-10-CM

## 2020-11-23 LAB
BASOPHILS # BLD AUTO: 0 10E9/L (ref 0–0.2)
BASOPHILS NFR BLD AUTO: 0.3 %
DIFFERENTIAL METHOD BLD: ABNORMAL
EOSINOPHIL # BLD AUTO: 0.1 10E9/L (ref 0–0.7)
EOSINOPHIL NFR BLD AUTO: 1.7 %
ERYTHROCYTE [DISTWIDTH] IN BLOOD BY AUTOMATED COUNT: 14.1 % (ref 10–15)
HCT VFR BLD AUTO: 36 % (ref 40–53)
HGB BLD-MCNC: 11.4 G/DL (ref 13.3–17.7)
IMM GRANULOCYTES # BLD: 0 10E9/L (ref 0–0.4)
IMM GRANULOCYTES NFR BLD: 0.1 %
INR PPP: 1.64 (ref 0.86–1.14)
LYMPHOCYTES # BLD AUTO: 0.9 10E9/L (ref 0.8–5.3)
LYMPHOCYTES NFR BLD AUTO: 12.2 %
MCH RBC QN AUTO: 31.1 PG (ref 26.5–33)
MCHC RBC AUTO-ENTMCNC: 31.7 G/DL (ref 31.5–36.5)
MCV RBC AUTO: 98 FL (ref 78–100)
MONOCYTES # BLD AUTO: 0.8 10E9/L (ref 0–1.3)
MONOCYTES NFR BLD AUTO: 11 %
NEUTROPHILS # BLD AUTO: 5.3 10E9/L (ref 1.6–8.3)
NEUTROPHILS NFR BLD AUTO: 74.7 %
NRBC # BLD AUTO: 0 10*3/UL
NRBC BLD AUTO-RTO: 0 /100
PLATELET # BLD AUTO: 163 10E9/L (ref 150–450)
RBC # BLD AUTO: 3.67 10E12/L (ref 4.4–5.9)
WBC # BLD AUTO: 7.1 10E9/L (ref 4–11)

## 2020-11-23 PROCEDURE — 85610 PROTHROMBIN TIME: CPT | Performed by: PATHOLOGY

## 2020-11-23 PROCEDURE — 84443 ASSAY THYROID STIM HORMONE: CPT | Performed by: PATHOLOGY

## 2020-11-23 PROCEDURE — 82550 ASSAY OF CK (CPK): CPT | Performed by: PATHOLOGY

## 2020-11-23 PROCEDURE — 99214 OFFICE O/P EST MOD 30 MIN: CPT | Performed by: INTERNAL MEDICINE

## 2020-11-23 PROCEDURE — 36415 COLL VENOUS BLD VENIPUNCTURE: CPT | Performed by: PATHOLOGY

## 2020-11-23 PROCEDURE — 80053 COMPREHEN METABOLIC PANEL: CPT | Performed by: PATHOLOGY

## 2020-11-23 RX ORDER — WARFARIN SODIUM 5 MG/1
TABLET ORAL
Qty: 90 TABLET | Refills: 5 | Status: SHIPPED | OUTPATIENT
Start: 2020-11-23 | End: 2021-05-03

## 2020-11-23 ASSESSMENT — PAIN SCALES - GENERAL: PAINLEVEL: NO PAIN (0)

## 2020-11-23 NOTE — PROGRESS NOTES
ANTICOAGULATION FOLLOW-UP CLINIC VISIT    Patient Name:  Noe Florence  Date:  2020  Contact Type:  Telephone    SUBJECTIVE:         OBJECTIVE    Recent labs: (last 7 days)     20  1454   INR 1.64*       ASSESSMENT / PLAN  No question data found.  Anticoagulation Summary  As of 2020    INR goal:  1.5-2.5   TTR:  86.2 % (1 y)   INR used for dosin.64 (2020)   Warfarin maintenance plan:  5 mg (5 mg x 1) every day   Full warfarin instructions:  5 mg every day   Weekly warfarin total:  35 mg   No change documented:  Roya Manning RN   Plan last modified:  Roya Manning RN (10/21/2020)   Next INR check:  2020   Priority:  Maintenance   Target end date:  Indefinite    Indications    Atrial fibrillation (H) [I48.91] [I48.91]  Long-term (current) use of anticoagulants [Z79.01] [Z79.01]  Deep vein thrombosis (DVT) (H) [I82.409]  Atrial fibrillation  unspecified type (H) [I48.91]             Anticoagulation Episode Summary     INR check location:      Preferred lab:      Send INR reminders to:  [facility]    Comments:  Patient contact number: [phone] Hx of Falls/Bleed  Can leave results with Rica (friend)  Restarted Warfarin due to DVT.       Anticoagulation Care Providers     Provider Role Specialty Phone number    Frida Desai MD Referring Cardiovascular Disease [phone]            See the Encounter Report to view Anticoagulation Flowsheet and Dosing Calendar (Go to Encounters tab in chart review, and find the Anticoagulation Therapy Visit)    Left message for patient with results and dosing recommendations. Asked patient to call back to report any missed doses, falls, signs and symptoms of bleeding or clotting, any changes in health, medication, or diet. Asked patient to call back with any questions or concerns.      Roya Manning RN

## 2020-11-23 NOTE — NURSING NOTE
Chief Complaint   Patient presents with     Referral     Need referral to neurophysical therapy      Colonoscopy     Pt would like to discuss need for colonoscopy      Fungal Infection     Pt reports fungal infection in foot      SURYA Canela at 3:10 PM sign on 11/23/2020

## 2020-11-24 DIAGNOSIS — M62.81 MUSCLE WEAKNESS (GENERALIZED): ICD-10-CM

## 2020-11-24 LAB
ALBUMIN SERPL-MCNC: 3.4 G/DL (ref 3.4–5)
ALP SERPL-CCNC: 94 U/L (ref 40–150)
ALT SERPL W P-5'-P-CCNC: 23 U/L (ref 0–70)
ANION GAP SERPL CALCULATED.3IONS-SCNC: 4 MMOL/L (ref 3–14)
AST SERPL W P-5'-P-CCNC: 19 U/L (ref 0–45)
BILIRUB SERPL-MCNC: 0.3 MG/DL (ref 0.2–1.3)
BUN SERPL-MCNC: 44 MG/DL (ref 7–30)
CALCIUM SERPL-MCNC: 9.2 MG/DL (ref 8.5–10.1)
CHLORIDE SERPL-SCNC: 107 MMOL/L (ref 94–109)
CK SERPL-CCNC: 49 U/L (ref 30–300)
CO2 SERPL-SCNC: 27 MMOL/L (ref 20–32)
CREAT SERPL-MCNC: 1.42 MG/DL (ref 0.66–1.25)
GFR SERPL CREATININE-BSD FRML MDRD: 45 ML/MIN/{1.73_M2}
GLUCOSE SERPL-MCNC: 115 MG/DL (ref 70–99)
POTASSIUM SERPL-SCNC: 4.4 MMOL/L (ref 3.4–5.3)
PROT SERPL-MCNC: 7.4 G/DL (ref 6.8–8.8)
SODIUM SERPL-SCNC: 138 MMOL/L (ref 133–144)
TSH SERPL DL<=0.005 MIU/L-ACNC: 0.68 MU/L (ref 0.4–4)

## 2020-11-24 NOTE — PROGRESS NOTES
HPI  85-year-old returns today with his wife with several concerns.  He has not been faithful about doing his physical therapy exercises recently.  In part this relates to the pandemic and he has showed an.  We discussed that it is important for him to try and maintain physical activity.  He will try and use his cycle paddles and upper body ergometer which he already has an immune reinforced the need for this.  There is also questions about his stools.  He has had large volume stools.  This is not associated with any pain straining or blood however but apparently the stools at times will clog the toilet.  We discussed fluid and fiber as a way to try and soften the stool.  We discussed his weight which is down recently.  Discussed increasing the protein intake and taking this at at least 1.2 g/kg.  We will try and increase his protein intake and proportionate over his 3 meals.  He has been using boost on a regular basis and will try and increase this.  He has a history of colon polyps and his colonoscopy has been deferred.  Question about the need for this.  He last had a colonoscopy in 2019.  At that time was found to have malignant polyp after only 4 years.  We discussed waiting until after the pandemic to repeat the colonoscopy.  Has had a rash on his foot bilaterally left greater than right which is been treated with ketoconazole ointment but without complete resolution.  Is not been any itching in association with this.  Past Medical History:   Diagnosis Date     Advanced open-angle glaucoma      Atrial fibrillation (H)      CKD (chronic kidney disease), stage III 2005     Colon cancer (H)     Stage II-B colon cancer     Coronary artery disease     s/p CABG x 2, JEREMY x 2     Diverticulitis      Hyperlipidemia      Hypertension      Ischemic cardiomyopathy      MGUS (monoclonal gammopathy of unknown significance)      Nonsenile cataract     BE     Osteoporosis     left hip fracture     Polymorphic ventricular  tachycardia (H)      Polymyalgia rheumatica (H)      PVD (posterior vitreous detachment), both eyes 2005     S/P ablation of atrial flutter 6/20/14    CTI     Stented coronary artery      SVT (supraventricular tachycardia) (H)     PPM/AICD for NSVT     Upper leg DVT (deep venous thromboembolism), chronic (H)     Left     Weight loss, unintentional 2017    15 lb in 4 months     Past Surgical History:   Procedure Laterality Date     C CABG, ARTERY-VEIN, FOUR  2006    LIMA-LAD, SVG-Rt PDA, SVG-OM2, SVG-Diag 1     C CABG, VEIN, SINGLE  1994    1-vessel CABG, SVG->PDRCA      CATARACT IOL, RT/LT Bilateral      COLONOSCOPY  3/13/2014    Procedure: COMBINED COLONOSCOPY, SINGLE BIOPSY/POLYPECTOMY BY BIOPSY;;  Surgeon: Mary Gerber MD;  Location:  GI     COLONOSCOPY N/A 6/22/2015    Procedure: COLONOSCOPY;  Surgeon: Marilin Newman MD;  Location:  GI     COLONOSCOPY N/A 11/7/2018    Procedure: COMBINED COLONOSCOPY, SINGLE OR MULTIPLE BIOPSY/POLYPECTOMY BY BIOPSY;  Surgeon: Roberto Esteban MD;  Location:  GI     EP ICD GENERATOR CHANGE DUAL N/A 12/11/2018    Procedure: EP ICD Generator Change Dual;  Surgeon: Deedee Baird MD;  Location:  HEART CARDIAC CATH LAB     H ABLATION ATRIAL FLUTTER       HEART CATH DRUG ELUTING STENT PLACEMENT  4/19/2012    JEREMY x 2 to LCx     IMPLANT IMPLANTABLE CARDIOVERTER DEFIBRILLATOR  5-    ppm/aicd     KNEE SURGERY      right and left knee surgeries     LAPAROSCOPIC ASSISTED COLECTOMY Right 2/1/2019    Procedure: Laparoscopic Converted to Open Transverse Colectomy with Lysis of Adhesions;  Surgeon: Chanelle Guzmán MD;  Location:  OR     LAPAROSCOPIC ASSISTED COLECTOMY LEFT (DESCENDING)  4/8/2014    Procedure: LAPAROSCOPIC ASSISTED COLECTOMY LEFT (DESCENDING);  Hand assisted Laparoscopic Sigmoid Colectomy , Laparoscopic mobilization of spleenic flexure *Latex Allergy*Anesthesia General with Block;  Surgeon: Chanelle Guzmán MD;   Location: [facility]     OPEN REDUCTION INTERNAL FIXATION RODDING INTRAMEDULLAR FEMUR FRACTURE TABLE Left 3/14/2019    Procedure: Open Reduction Internal Fixation Left Femur Intramedullar Nailing;  Surgeon: Srikanth Avalos MD;  Location: [facility]     REPAIR VALVE MITRAL  2006     30-mm Medtronic Julian ring      SELECTIVE LASER TRABECULOPLASTY (SLT) OD (RIGHT EYE)  4/10, ,+13    RE     SELECTIVE LASER TRABECULOPLASTY (SLT) OS (LEFT EYE)  2012     SHOULDER SURGERY      right rotator cuff     Family History   Problem Relation Age of Onset     C.A.D. Father      Anesthesia Reaction No family hx of      Crohn's Disease No family hx of      Ulcerative Colitis No family hx of      Cancer - colorectal No family hx of      Macular Degeneration No family hx of      Cancer No family hx of         No known family hx of skin cancer     Melanoma No family hx of      Skin Cancer No family hx of      Social History     Socioeconomic History     Marital status:      Spouse name: None     Number of children: None     Years of education: None     Highest education level: None   Occupational History     None   Social Needs     Financial resource strain: None     Food insecurity     Worry: None     Inability: None     Transportation needs     Medical: None     Non-medical: None   Tobacco Use     Smoking status: Former Smoker     Types: Cigarettes, Cigars     Quit date: 1968     Years since quittin.9     Smokeless tobacco: Never Used   Substance and Sexual Activity     Alcohol use: Yes     Alcohol/week: 0.0 standard drinks     Comment: 2-3 drinks twice weekly     Drug use: No     Sexual activity: None   Lifestyle     Physical activity     Days per week: None     Minutes per session: None     Stress: None   Relationships     Social connections     Talks on phone: None     Gets together: None     Attends Muslim service: None     Active member of club or organization: None     Attends meetings of clubs or  organizations: None     Relationship status: None     Intimate partner violence     Fear of current or ex partner: None     Emotionally abused: None     Physically abused: None     Forced sexual activity: None   Other Topics Concern     Parent/sibling w/ CABG, MI or angioplasty before 65F 55M? Not Asked   Social History Narrative     None       Exam:  /68 (BP Location: Right arm, Patient Position: Sitting, Cuff Size: Adult Regular)   Pulse 67   Wt 66.2 kg (145 lb 14.4 oz)   SpO2 96%   BMI 22.85 kg/m    145 lbs 14.4 oz  The patient is alert, oriented with a clear sensorium.   Skin shows no lesions or rashes and good turgor.   Head is normocephalic and atraumatic.    Neck shows no nodes, thyromegaly.     Lungs are clear.   Heart shows normal S1 and S2 without murmur or gallop.    Extremities show venous stasis changes with trace pretibial edema.  Does have a erythematous color change on the distal foot with a raised scaling leading edge.  There is some central clearing as well with this.        ASSESSMENT  1 deconditioning and muscle weakness  2 tenia rash of the feet  3 history colon cancer and malignant polyp will defer colonoscopy until next year  4 hypertension well controlled  5 hyperlipidemia  6 renal insufficiency  7 anemia secondary to above  8 urinary incontinence  9 peripheral arterial disease  10 monoclonal gammopathy stable  11 Hip fracture with nailing 3/14/2019  12 CAD S/P CABGx2 with JEREMY  13 VT S/P ICD  14 paroxysmal atrial fibrillation  15 osteoporosis  16 history of DVT    Plan  We will refer back for physical therapy.  We will increase the protein as discussed and continue the ketoconazole for the foot rash.  You need for the colonoscopy until next year.  We will increase his physical activity and exercise.  Have him follow-up in 6 months sooner if any increased symptoms or problems.  Over 25 minutes spent with patient the majority in counseling and coordinating care.    This note was  completed using Dragon voice recognition software.        Roberto Sarmiento MD  General Internal Medicine  [facility]  [phone]

## 2020-12-14 ENCOUNTER — THERAPY VISIT (OUTPATIENT)
Dept: PHYSICAL THERAPY | Facility: CLINIC | Age: 85
End: 2020-12-14
Attending: INTERNAL MEDICINE
Payer: COMMERCIAL

## 2020-12-14 DIAGNOSIS — M21.379 FOOT-DROP, UNSPECIFIED LATERALITY: Primary | ICD-10-CM

## 2020-12-14 DIAGNOSIS — R53.81 PHYSICAL DECONDITIONING: ICD-10-CM

## 2020-12-14 DIAGNOSIS — M62.81 MUSCLE WEAKNESS (GENERALIZED): ICD-10-CM

## 2020-12-14 PROCEDURE — 97161 PT EVAL LOW COMPLEX 20 MIN: CPT | Mod: GP | Performed by: PHYSICAL THERAPIST

## 2020-12-14 PROCEDURE — 97110 THERAPEUTIC EXERCISES: CPT | Mod: GP | Performed by: PHYSICAL THERAPIST

## 2020-12-14 NOTE — PROGRESS NOTES
12/14/20 1200   Quick Adds   Type of Visit Initial OP PT Evaluation   General Information   Start of Care Date 12/14/20   Referring Physician Roberto Sarmiento MD   Orders Evaluate and Treat as Indicated   Order Date 11/23/20   Medical Diagnosis Physical deconditioning, muscle weakness   Onset of illness/injury or Date of Surgery 11/23/20   Precautions/Limitations fall precautions   Surgical/Medical history reviewed Yes   Pertinent history of current problem (include personal factors and/or comorbidities that impact the POC) Patient reports decreased activity performance ability due to COVID-19 pandemic (unable to walk at the clinic anymore, no longer has PCA help-wife is primary caregiver with occasional assistance from son) which has resulted in decreased independence with exercise.  Patient reports increased difficulty with walking with cane and reports left ankle/foot weakness as primary concern.  No recent falls reported by patient or wife.past medical history: Glaucoma, A. fib, chronic kidney disease, colon cancer, coronary artery disease with stent placement, hyperlipidemia, hypertension, MGUS, cardiomyopathy, osteoporosis with left hip fracture and peripheral vascular disease.   Pertinent Visual History  History of glaucoma   Prior level of function comment Independent with walker, walking 15 minutes a day with straight cane at supervision level.  Patient was completing stairs daily for exercise   Previous/Current Treatment Physical Therapy   Improvement after PT Significant   Current Community Support Family/friend caregiver   Patient role/Employment history Retired   Living environment House/townhome   Home/Community Accessibility Comments Small house, limited room for mobility.  Stairs with 1HR; complete with supervision   Current Assistive Devices Walker   Assistive Devices Comments Walker is primary mode of mobility.  Has straight point cane; limited use   Patient/Family Goals Statement To get new  exercises and to walk with a cane   Fall Risk Screen   Fall screen completed by PT   Have you fallen 2 or more times in the past year? No   Have you fallen and had an injury in the past year? No   Timed Up and Go score (seconds) 42.63   Is patient a fall risk? Yes   Fall screen comments SEC   Pain   Patient currently in pain No   Posture   Posture Forward head position;Protracted shoulders;Kyphosis   Posture Comments Crouched posture, flexed forward posture in stance and gait   Range of Motion (ROM)   ROM Comment Hamstring length: L -34, R-20. Ankle DF: L -20, R-10   Strength   Strength Comments Bilateral hip flexion 4/5, bilateral knee flexion/extension 4/5, right ankle dorsiflexion 4/5, left ankle dorsiflexion 2 -/5   Bed Mobility   Bed Mobility Comments Modified independent   Transfer Skills   Transfer Comments Modified independent with walker   Gait   Gait Comments Patient ambulates with rolling walker modified independent level; significant crouched and flexed forward posture he demonstrated with reduced paty, stride length, limited bilateral heel strike.  Patient ambulates with straight point cane at contact-guard assist level; increased crouched posturing, step to gait (decreased stance time on left), reduction in paty   Balance Special Tests   Balance Special Tests Timed up and go;Sit to stand reps   Balance Special Tests Timed Up and Go   Seconds 42.63 Seconds   Comments Straight point cane   Balance Special Tests Sit to Stand Reps in 30 Seconds   Reps in 30 seconds 0   Height 20   Comments Unable to perform without upper extremity support   Planned Therapy Interventions   Planned Therapy Interventions balance training;bed mobility training;gait training;neuromuscular re-education;joint mobilization;motor coordination training;orthotic fitting/training;ROM;strengthening;stretching;transfer training;manual therapy   Clinical Impression   Criteria for Skilled Therapeutic Interventions Met yes, treatment  indicated   PT Diagnosis Imbalance, deconditioning   Influenced by the following impairments Sensation, range of motion, strength, posture, cardiovascular deconditioning, balance   Functional limitations due to impairments Impaired gait-household and community mobility, decreased participation in daily activities/leisure activities, increased fall risk   Clinical Presentation Stable/Uncomplicated   Clinical Presentation Rationale Personal factors, body systems involved, fall risk   Clinical Decision Making (Complexity) Low complexity   Therapy Frequency other (see comments)  (biweekly)   Predicted Duration of Therapy Intervention (days/wks) 6 to 8 weeks   Risk & Benefits of therapy have been explained Yes   Patient, Family & other staff in agreement with plan of care Yes   Clinical Impression Comments Patient is an 85-year-old male who presents to outpatient physical therapy with reports of deconditioning and impaired activity participation.  Patient demonstrates significant posture and tissue mobility restrictions which impacts his overall stability in gait and increases his risk for falls.  Patient with history of left foot drop; currently uses foot up brace and may benefit from additional support for gait stability.  Patient would benefit from skilled physical therapy services for functional mobility upgrading and fall risk reduction to improve overall participation in safe mobility.  Patient has good support of life, however no longer has PCA support due to Covid pandemic and requires further skilled intervention to provide home exercise program that patient can perform independently.   GOALS   PT Eval Goals 1;2;3;4   Goal 1   Goal Identifier TUG   Goal Description Patient will perform the TUG with straight point cane in less than 30 seconds to decrease fall risk and improve functional mobility    Target Date 03/13/21   Goal 2   Goal Identifier 30 second sit to stand test   Goal Description Patient will complete  at least 5 repetitions on the 30 second sit to stand test for improved gait stability   Target Date 03/13/21   Goal 3   Goal Identifier Muscle tissue length   Goal Description Patient will demonstrate improvement of at least 5 degrees in bilateral hamstring and gastroc length for improved posture and gait stability   Target Date 03/13/21   Goal 4   Goal Identifier HEP   Goal Description Patient will be independent home exercise program for maintenance of therapy gains at discharge and improved independence with self exercise performance at home   Target Date 03/13/21   Total Evaluation Time   PT Eval, Low Complexity Minutes (35874) 35     Billie Montero PT, DPT  Physical Therapist  [facility] [address]  [address] [place] [address]  [email or web address]  Office: [phone]

## 2020-12-17 ENCOUNTER — TELEPHONE (OUTPATIENT)
Dept: INTERNAL MEDICINE | Facility: CLINIC | Age: 85
End: 2020-12-17
Payer: COMMERCIAL

## 2020-12-17 NOTE — TELEPHONE ENCOUNTER
Patient called the Pharmacy and stated that he had been splitting an Atorvastatin 40mg tablet because he was instructed to only take 20mg.  Our current  for the Atorvastatin no longer has a line down the middle of the tablet and is now difficult to split in half and get the correct dose.  Please send a new order for Atorvastatin 20mg tablets to take one tablet per day if appropriate.    Thanks!  Karla Reddy [facility]

## 2020-12-21 ENCOUNTER — ANTICOAGULATION THERAPY VISIT (OUTPATIENT)
Dept: ANTICOAGULATION | Facility: CLINIC | Age: 85
End: 2020-12-21

## 2020-12-21 DIAGNOSIS — I48.91 ATRIAL FIBRILLATION, UNSPECIFIED TYPE (H): ICD-10-CM

## 2020-12-21 DIAGNOSIS — I82.409 DEEP VEIN THROMBOSIS (DVT) (H): ICD-10-CM

## 2020-12-21 DIAGNOSIS — Z79.01 LONG TERM CURRENT USE OF ANTICOAGULANT THERAPY: ICD-10-CM

## 2020-12-21 LAB — INR PPP: 1.93 (ref 0.86–1.14)

## 2020-12-21 PROCEDURE — 36416 COLLJ CAPILLARY BLOOD SPEC: CPT | Performed by: PATHOLOGY

## 2020-12-21 PROCEDURE — 85610 PROTHROMBIN TIME: CPT | Performed by: PATHOLOGY

## 2020-12-21 NOTE — PROGRESS NOTES
ANTICOAGULATION FOLLOW-UP CLINIC VISIT    Patient Name:  Noe Florence  Date:  2020  Contact Type:  Telephone    SUBJECTIVE:  Patient Findings     Comments:  Spoke with patient's friend Rica-denies any current changes to Sha's health, diet or medications. Advised to continue current dosing regimen-5mg every day with next INR in 4 weeks. Assisted with setting up lab appt.         Clinical Outcomes     Negatives:  Major bleeding event, Thromboembolic event, Anticoagulation-related hospital admission, Anticoagulation-related ED visit, Anticoagulation-related fatality    Comments:  Spoke with patient's friend Rica-denies any current changes to Sha's health, diet or medications. Advised to continue current dosing regimen-5mg every day with next INR in 4 weeks. Assisted with setting up lab appt.            OBJECTIVE    Recent labs: (last 7 days)     20  1403   INR 1.93*       ASSESSMENT / PLAN  No question data found.  Anticoagulation Summary  As of 2020    INR goal:  1.5-2.5   TTR:  87.2 % (1 y)   INR used for dosin.93 (2020)   Warfarin maintenance plan:  5 mg (5 mg x 1) every day   Full warfarin instructions:  5 mg every day   Weekly warfarin total:  35 mg   No change documented:  Julita Buchanan RN   Plan last modified:  Roya Manning RN (10/21/2020)   Next INR check:  2021   Priority:  Maintenance   Target end date:  Indefinite    Indications    Atrial fibrillation (H) [I48.91] [I48.91]  Long-term (current) use of anticoagulants [Z79.01] [Z79.01]  Deep vein thrombosis (DVT) (H) [I82.409]  Atrial fibrillation  unspecified type (H) [I48.91]             Anticoagulation Episode Summary     INR check location:      Preferred lab:      Send INR reminders to:  [facility]    Comments:  Patient contact number: [phone] Hx of Falls/Bleed  Can leave results with Rica (friend)  Restarted Warfarin due to DVT.       Anticoagulation Care Providers     Provider Role  Specialty Phone number    Frida Desai MD Referring Cardiovascular Disease [phone]            See the Encounter Report to view Anticoagulation Flowsheet and Dosing Calendar (Go to Encounters tab in chart review, and find the Anticoagulation Therapy Visit)    Spoke with patient's friend Rica. Gave them their lab results and new warfarin recommendation.  No changes in health, medication, or diet. No missed doses, no falls. No signs or symptoms of bleed or clotting.     BEVERLEY LOONEY RN

## 2020-12-28 ENCOUNTER — THERAPY VISIT (OUTPATIENT)
Dept: PHYSICAL THERAPY | Facility: CLINIC | Age: 85
End: 2020-12-28
Payer: COMMERCIAL

## 2020-12-28 DIAGNOSIS — R53.81 PHYSICAL DECONDITIONING: Primary | ICD-10-CM

## 2020-12-28 PROCEDURE — 97110 THERAPEUTIC EXERCISES: CPT | Mod: GP | Performed by: PHYSICAL THERAPIST

## 2020-12-28 PROCEDURE — 97116 GAIT TRAINING THERAPY: CPT | Mod: GP | Performed by: PHYSICAL THERAPIST

## 2021-01-11 ENCOUNTER — THERAPY VISIT (OUTPATIENT)
Dept: PHYSICAL THERAPY | Facility: CLINIC | Age: 86
End: 2021-01-11
Payer: COMMERCIAL

## 2021-01-11 DIAGNOSIS — R53.81 PHYSICAL DECONDITIONING: Primary | ICD-10-CM

## 2021-01-11 PROCEDURE — 97110 THERAPEUTIC EXERCISES: CPT | Mod: GP | Performed by: PHYSICAL THERAPIST

## 2021-01-11 PROCEDURE — 97116 GAIT TRAINING THERAPY: CPT | Mod: GP | Performed by: PHYSICAL THERAPIST

## 2021-01-15 ENCOUNTER — HEALTH MAINTENANCE LETTER (OUTPATIENT)
Age: 86
End: 2021-01-15

## 2021-01-18 ENCOUNTER — ANTICOAGULATION THERAPY VISIT (OUTPATIENT)
Dept: ANTICOAGULATION | Facility: CLINIC | Age: 86
End: 2021-01-18

## 2021-01-18 DIAGNOSIS — Z79.01 LONG TERM CURRENT USE OF ANTICOAGULANT THERAPY: ICD-10-CM

## 2021-01-18 DIAGNOSIS — I48.91 ATRIAL FIBRILLATION, UNSPECIFIED TYPE (H): ICD-10-CM

## 2021-01-18 DIAGNOSIS — I82.409 DEEP VEIN THROMBOSIS (DVT) (H): ICD-10-CM

## 2021-01-18 LAB — INR PPP: 1.95 (ref 0.86–1.14)

## 2021-01-18 PROCEDURE — 36416 COLLJ CAPILLARY BLOOD SPEC: CPT | Performed by: PATHOLOGY

## 2021-01-18 PROCEDURE — 85610 PROTHROMBIN TIME: CPT | Performed by: PATHOLOGY

## 2021-01-18 NOTE — PROGRESS NOTES
ANTICOAGULATION FOLLOW-UP CLINIC VISIT    Patient Name:  Noe Florence  Date:  2021  Contact Type:  Telephone    SUBJECTIVE:         OBJECTIVE    Recent labs: (last 7 days)     21  1609   INR 1.95*       ASSESSMENT / PLAN  INR assessment THER    Recheck INR In: 4 WEEKS    INR Location Clinic      Anticoagulation Summary  As of 2021    INR goal:  1.5-2.5   TTR:  87.2 % (1 y)   INR used for dosin.95 (2021)   Warfarin maintenance plan:  5 mg (5 mg x 1) every day   Full warfarin instructions:  5 mg every day   Weekly warfarin total:  35 mg   No change documented:  Leena Abebe RN   Plan last modified:  Roya Manning RN (10/21/2020)   Next INR check:  2/15/2021   Priority:  Maintenance   Target end date:  Indefinite    Indications    Atrial fibrillation (H) [I48.91] [I48.91]  Long-term (current) use of anticoagulants [Z79.01] [Z79.01]  Deep vein thrombosis (DVT) (H) [I82.409]  Atrial fibrillation  unspecified type (H) [I48.91]             Anticoagulation Episode Summary     INR check location:      Preferred lab:      Send INR reminders to:  [facility]    Comments:  Patient contact number: [phone] Hx of Falls/Bleed  Can leave results with Rica (friend)  Restarted Warfarin due to DVT.       Anticoagulation Care Providers     Provider Role Specialty Phone number    Frida Desai MD Referring Cardiovascular Disease [phone]            See the Encounter Report to view Anticoagulation Flowsheet and Dosing Calendar (Go to Encounters tab in chart review, and find the Anticoagulation Therapy Visit)  Spoke with Rica.    Leena Abebe, BYRON

## 2021-01-25 ENCOUNTER — THERAPY VISIT (OUTPATIENT)
Dept: PHYSICAL THERAPY | Facility: CLINIC | Age: 86
End: 2021-01-25
Payer: COMMERCIAL

## 2021-01-25 DIAGNOSIS — R53.81 PHYSICAL DECONDITIONING: ICD-10-CM

## 2021-01-25 DIAGNOSIS — M62.81 MUSCLE WEAKNESS (GENERALIZED): Primary | ICD-10-CM

## 2021-01-25 PROCEDURE — 97116 GAIT TRAINING THERAPY: CPT | Mod: GP | Performed by: PHYSICAL THERAPIST

## 2021-01-25 PROCEDURE — 97110 THERAPEUTIC EXERCISES: CPT | Mod: GP | Performed by: PHYSICAL THERAPIST

## 2021-01-25 NOTE — DISCHARGE INSTRUCTIONS
1. Continue your leg stretches at least 3 times a day for at least 30 seconds duration. Focus on keeping knee straight and band around your toes  2. Stand against door to focus on posture: keep heels against door and try and put shoulders and head against door. Hold as long as you can then walk away keeping the same posture  3. Bike for cardio    With Christopher: stairs and arm strengthening exercises

## 2021-01-27 ENCOUNTER — OFFICE VISIT (OUTPATIENT)
Dept: DERMATOLOGY | Facility: CLINIC | Age: 86
End: 2021-01-27
Payer: COMMERCIAL

## 2021-01-27 DIAGNOSIS — L82.0 INFLAMED SEBORRHEIC KERATOSIS: ICD-10-CM

## 2021-01-27 DIAGNOSIS — B35.3 TINEA PEDIS, UNSPECIFIED LATERALITY: Primary | ICD-10-CM

## 2021-01-27 DIAGNOSIS — L85.3 XEROSIS CUTIS: ICD-10-CM

## 2021-01-27 PROCEDURE — 17110 DESTRUCTION B9 LES UP TO 14: CPT | Performed by: DERMATOLOGY

## 2021-01-27 PROCEDURE — 99213 OFFICE O/P EST LOW 20 MIN: CPT | Mod: 25 | Performed by: DERMATOLOGY

## 2021-01-27 PROCEDURE — 87210 SMEAR WET MOUNT SALINE/INK: CPT | Performed by: DERMATOLOGY

## 2021-01-27 RX ORDER — PRENATAL VIT 91/IRON/FOLIC/DHA 28-975-200
COMBINATION PACKAGE (EA) ORAL
Qty: 84 G | Refills: 11 | Status: SHIPPED | OUTPATIENT
Start: 2021-01-27 | End: 2021-02-02

## 2021-01-27 ASSESSMENT — PAIN SCALES - GENERAL: PAINLEVEL: NO PAIN (0)

## 2021-01-27 NOTE — NURSING NOTE
Dermatology Rooming Note    Noe Florence's goals for this visit include:   Chief Complaint   Patient presents with     Derm Problem     Tinea pedis, bilateral feet - improved but still present     Elisha Biswas, CMA

## 2021-01-27 NOTE — LETTER
1/27/2021       RE: Noe Florence  [address] [facility] [address]     Dear Colleague,    Thank you for referring your patient, Noe Florence, to the [facility] D[facility]MAT[facility] [place] at [facility]. Please see a copy of my visit note below.    [facility] Dermatology Note  Encounter Date: Jan 27, 2021  Office Visit     Dermatology Problem List:  # Innumerable seborrheic keratoses. LiqN2.   # History of tinea pedis (10/2020), corporis (8/2013, 2015), and cruris (8/2013)  - current: ketoconazole 2% cream BID to feet   - prior: terbinafine cream   # Stasis dermatitis  # Xerosis cutis  ____________________________________________    Assessment & Plan:     # Inflamed SKs. Right neck x 1, left breast x 2, left mammary fold x 3, and right inguinal area x 1.  - Cryotherapy as below    # Tinea pedis. KOH positive today. Discussed possibility of oral terbinafine given more diffuse distribution. Did review medications significant for metoprolol for a.fib which can have increased serum concentrations while on terbinafine. Asked to discuss with cardiologist, and would consider 2-week course at follow-up. Otherwise switch to terbinafine 1% cream.   - Start terbinafine 1% cream BID x 3-4 weeks until resolved; then at least 3-4 days monthly as maintenance.     # Xerosis cutis. Continue CeraVe moisturizer.     Procedures Performed:   - KOH prep was obtained and interpreted as positive.    - Cryotherapy procedure note: After verbal consent and discussion of risks and benefits including but no limited to dyspigmentation/scar, blister, and pain, 7 lesions was(were) treated with 1-2mm freeze border for 2 cycles with liquid nitrogen. Post cryotherapy instructions were provided.     Follow-up: 3 month(s) in-person, or earlier for new or changing lesions    Staff:     Matt Vásquez MD  Pronouns: he/him/his    Department of  Dermatology  [facility]: Phone: [phone], Fax:[phone]  [facility]: Phone: [phone] Fax: [phone]    ____________________________________________    CC: Derm Problem (Tinea pedis, bilateral feet - improved but still present)    HPI:  Mr. Noe Florence is a(n) 85 year old male who presents today as a return patient for follow-up tinea pedis, inflamed SKs, dry skin. Last seen by Dr. Jackson on 10/27/2020 when had cryotherapy for AKs and shave biopsy of a lesion on the L parietal scalp c/w a clonal seborrheic keratosis. He is accompanied by his partner today.     Patient reports a few concerns including:    (1) Tinea pedis - improved significantly from prior but not resolved. Have used the ketoconazole 2% cream though have admittedly be more recently inconsistent with application. Still scaly on bottoms of the feet.     (2) Still with few itchy, raised brown spots, including lesions of concern on the right neck x 1, left breast x 2, left mammary fold x 3, and right inguinal area x 1.    (3) Dry skin. Improved with use of CeraVe moisturizer.    Patient is otherwise feeling well, without additional concerns.     Labs:  NA    Physical Exam:  Vitals: There were no vitals taken for this visit.  SKIN: Focused examination of feet, trunk and extremities was performed.  - Pink, erythematous, thin scaly plaques in moccasin distribution on bilateral feet, notably sparing toe interwebs. LEO performed and positive for hyphae.   - Numerous brown, waxy, stuck-on appearing papules including inflamed lesions on the right neck x 1, left breast x 2, left mammary fold x 3, and right inguinal area x 1.  - No other lesions of concern on areas examined.     Medications:  Current Outpatient Medications   Medication     alendronate (FOSAMAX) 70 MG tablet     ARIPiprazole (ABILIFY) 2 MG tablet     atorvastatin (LIPITOR)  40 MG tablet     calcium carbonate 500 mg, elemental, (OSCAL;OYSTER SHELL CALCIUM) 500 MG tablet     cholecalciferol 1000 units TABS     COMPRESSION STOCKINGS     Cyanocobalamin (VITAMIN B-12 CR PO)     ferrous sulfate (FE TABS) 325 (65 Fe) MG EC tablet     ketoconazole (NIZORAL) 2 % external cream     metoprolol tartrate (LOPRESSOR) 25 MG tablet     mirtazapine (REMERON) 15 MG tablet     Multiple Vitamins-Minerals (MULTIVITAMIN ADULT PO)     sertraline (ZOLOFT) 100 MG tablet     tamsulosin (FLOMAX) 0.4 MG capsule     tiZANidine (ZANAFLEX) 2 MG tablet     warfarin ANTICOAGULANT (COUMADIN) 5 MG tablet     No current facility-administered medications for this visit.       Past Medical History:   Patient Active Problem List   Diagnosis     Rotator cuff (capsule) sprain     Other postprocedural status(V45.89)     Hyperkalemia     Anemia     Diarrhea     Diverticulitis of colon     Hypertension     CAD (coronary artery disease)     NSVT (nonsustained ventricular tachycardia) (H)     NSTEMI (non-ST elevated myocardial infarction) (H)     Automatic implantable cardioverter-defibrillator - Medtronic dual chamber ICD - Not Dependent     Skin exam, screening for cancer     Tinea cruris     Tinea corporis     Colon cancer (H)     Polymyalgia rheumatica (H)     S/P ablation of atrial flutter     Primary open angle glaucoma     Atrial fibrillation (H) [I48.91]     Long-term (current) use of anticoagulants [Z79.01]     Ischemic cardiomyopathy     CKD (chronic kidney disease), stage III     Weight loss, unintentional     Skin infection     Tinea pedis of both feet     Venous stasis dermatitis of both lower extremities     Actinic keratosis     Inflamed seborrheic keratosis     Closed left subtrochanteric femur fracture (H)     Hip fracture (H)     Other osteoporosis without current pathological fracture     Deep vein thrombosis (DVT) (H)     Other closed nondisplaced fracture of distal end of humerus, unspecified laterality,  initial encounter     Syncope and collapse     Atrial fibrillation, unspecified type (H)     Past Medical History:   Diagnosis Date     Advanced open-angle glaucoma      Atrial fibrillation (H)      CKD (chronic kidney disease), stage III 2005     Colon cancer (H)     Stage II-B colon cancer     Coronary artery disease     s/p CABG x 2, JEREMY x 2     Diverticulitis      Hyperlipidemia      Hypertension      Ischemic cardiomyopathy      MGUS (monoclonal gammopathy of unknown significance)      Nonsenile cataract     BE     Osteoporosis     left hip fracture     Polymorphic ventricular tachycardia (H)      Polymyalgia rheumatica (H)      PVD (posterior vitreous detachment), both eyes 2005     S/P ablation of atrial flutter 6/20/14    CTI     Stented coronary artery      SVT (supraventricular tachycardia) (H)     PPM/AICD for NSVT     Upper leg DVT (deep venous thromboembolism), chronic (H)     Left     Weight loss, unintentional 2017    15 lb in 4 months

## 2021-01-27 NOTE — PROGRESS NOTES
[facility] Dermatology Note  Encounter Date: Jan 27, 2021  Office Visit     Dermatology Problem List:  # Innumerable seborrheic keratoses. LiqN2.   # History of tinea pedis (10/2020), corporis (8/2013, 2015), and cruris (8/2013)  - current: ketoconazole 2% cream BID to feet   - prior: terbinafine cream   # Stasis dermatitis  # Xerosis cutis  ____________________________________________    Assessment & Plan:     # Inflamed SKs. Right neck x 1, left breast x 2, left mammary fold x 3, and right inguinal area x 1.  - Cryotherapy as below    # Tinea pedis. KOH positive today. Discussed possibility of oral terbinafine given more diffuse distribution. Did review medications significant for metoprolol for a.fib which can have increased serum concentrations while on terbinafine. Asked to discuss with cardiologist, and would consider 2-week course at follow-up. Otherwise switch to terbinafine 1% cream.   - Start terbinafine 1% cream BID x 3-4 weeks until resolved; then at least 3-4 days monthly as maintenance.     # Xerosis cutis. Continue CeraVe moisturizer.     Procedures Performed:   - KOH prep was obtained and interpreted as positive.    - Cryotherapy procedure note: After verbal consent and discussion of risks and benefits including but no limited to dyspigmentation/scar, blister, and pain, 7 lesions was(were) treated with 1-2mm freeze border for 2 cycles with liquid nitrogen. Post cryotherapy instructions were provided.     Follow-up: 3 month(s) in-person, or earlier for new or changing lesions    Staff:     Matt Vásquez MD  Pronouns: he/him/his    Department of Dermatology  [facility]: Phone: [phone], Fax:[phone]  [facility]: Phone: [phone] Fax: [phone]    ____________________________________________    CC: Derm Problem (Tinea pedis, bilateral feet -  improved but still present)    HPI:  Mr. Noe Florence is a(n) 85 year old male who presents today as a return patient for follow-up tinea pedis, inflamed SKs, dry skin. Last seen by Dr. Jackson on 10/27/2020 when had cryotherapy for AKs and shave biopsy of a lesion on the L parietal scalp c/w a clonal seborrheic keratosis. He is accompanied by his partner today.     Patient reports a few concerns including:    (1) Tinea pedis - improved significantly from prior but not resolved. Have used the ketoconazole 2% cream though have admittedly be more recently inconsistent with application. Still scaly on bottoms of the feet.     (2) Still with few itchy, raised brown spots, including lesions of concern on the right neck x 1, left breast x 2, left mammary fold x 3, and right inguinal area x 1.    (3) Dry skin. Improved with use of CeraVe moisturizer.    Patient is otherwise feeling well, without additional concerns.     Labs:  NA    Physical Exam:  Vitals: There were no vitals taken for this visit.  SKIN: Focused examination of feet, trunk and extremities was performed.  - Pink, erythematous, thin scaly plaques in moccasin distribution on bilateral feet, notably sparing toe interwebs. LEO performed and positive for hyphae.   - Numerous brown, waxy, stuck-on appearing papules including inflamed lesions on the right neck x 1, left breast x 2, left mammary fold x 3, and right inguinal area x 1.  - No other lesions of concern on areas examined.     Medications:  Current Outpatient Medications   Medication     alendronate (FOSAMAX) 70 MG tablet     ARIPiprazole (ABILIFY) 2 MG tablet     atorvastatin (LIPITOR) 40 MG tablet     calcium carbonate 500 mg, elemental, (OSCAL;OYSTER SHELL CALCIUM) 500 MG tablet     cholecalciferol 1000 units TABS     COMPRESSION STOCKINGS     Cyanocobalamin (VITAMIN B-12 CR PO)     ferrous sulfate (FE TABS) 325 (65 Fe) MG EC tablet     ketoconazole (NIZORAL) 2 % external cream     metoprolol  tartrate (LOPRESSOR) 25 MG tablet     mirtazapine (REMERON) 15 MG tablet     Multiple Vitamins-Minerals (MULTIVITAMIN ADULT PO)     sertraline (ZOLOFT) 100 MG tablet     tamsulosin (FLOMAX) 0.4 MG capsule     tiZANidine (ZANAFLEX) 2 MG tablet     warfarin ANTICOAGULANT (COUMADIN) 5 MG tablet     No current facility-administered medications for this visit.       Past Medical History:   Patient Active Problem List   Diagnosis     Rotator cuff (capsule) sprain     Other postprocedural status(V45.89)     Hyperkalemia     Anemia     Diarrhea     Diverticulitis of colon     Hypertension     CAD (coronary artery disease)     NSVT (nonsustained ventricular tachycardia) (H)     NSTEMI (non-ST elevated myocardial infarction) (H)     Automatic implantable cardioverter-defibrillator - Medtronic dual chamber ICD - Not Dependent     Skin exam, screening for cancer     Tinea cruris     Tinea corporis     Colon cancer (H)     Polymyalgia rheumatica (H)     S/P ablation of atrial flutter     Primary open angle glaucoma     Atrial fibrillation (H) [I48.91]     Long-term (current) use of anticoagulants [Z79.01]     Ischemic cardiomyopathy     CKD (chronic kidney disease), stage III     Weight loss, unintentional     Skin infection     Tinea pedis of both feet     Venous stasis dermatitis of both lower extremities     Actinic keratosis     Inflamed seborrheic keratosis     Closed left subtrochanteric femur fracture (H)     Hip fracture (H)     Other osteoporosis without current pathological fracture     Deep vein thrombosis (DVT) (H)     Other closed nondisplaced fracture of distal end of humerus, unspecified laterality, initial encounter     Syncope and collapse     Atrial fibrillation, unspecified type (H)     Past Medical History:   Diagnosis Date     Advanced open-angle glaucoma      Atrial fibrillation (H)      CKD (chronic kidney disease), stage III 2005     Colon cancer (H)     Stage II-B colon cancer     Coronary artery disease      s/p CABG x 2, JEREMY x 2     Diverticulitis      Hyperlipidemia      Hypertension      Ischemic cardiomyopathy      MGUS (monoclonal gammopathy of unknown significance)      Nonsenile cataract     BE     Osteoporosis     left hip fracture     Polymorphic ventricular tachycardia (H)      Polymyalgia rheumatica (H)      PVD (posterior vitreous detachment), both eyes 2005     S/P ablation of atrial flutter 6/20/14    CTI     Stented coronary artery      SVT (supraventricular tachycardia) (H)     PPM/AICD for NSVT     Upper leg DVT (deep venous thromboembolism), chronic (H)     Left     Weight loss, unintentional 2017    15 lb in 4 months

## 2021-01-31 NOTE — PROGRESS NOTES
[facility]ARDIOLOGY FOLLOW UP VISIT:    HPI: Noe Florence is a 85 year old male with a past medical history significant for DVT in 4/2014 and then 7/2019, CAD s/p CABG x2, DESx2, polymorphic VT s/p ICD placement (5/2012, gen change 12/2018), and AF/AFL s/p CTI (6/2014) and right atrial appendage atrial tachycardia ablation (9/2014), HTN, HLD, and CKD3.     Cardiac risk factors include (+) hypertension, (+) hypercholesterolemia , (-) diabetes, (+) family Hx CAD, (-) tobacco, (-) cerebrovascular disease, (+) PAD.     He underwent colorectal surgery for colon cancer in February 2019.  In March 2019, patient fell and suffered a left hip fracture and underwent surgery.  In May 2019 patient fell again and suffered a distal right humerus fracture. With this fall history, his anticoagulation was stopped as Mr. Florence and his wife considered left atrial appendage occlusive device. He has a history of left femoral vein DVT several years ago but 3 weeks ago developed left LE edema again. He was evaluated in the ED on 7/26/19 and diagnosed with an occlusive popliteal vein DVT on ultrasound. He was started on lovenox and restarted on warfarin. Goal INR is between 1.5-2.5.     Patient is a long standing , who played vigorously up until 80 years old from a young age. Only when he stopped playing tennis did he discover progressive swelling. He was prescribed compression stockings (20-30 mmHg) however these were too large for him and would not stay up appropriately. His swelling was off and on for 2 years. Has an open wound for which gets wound care and dressings. 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 He is chronically on warfarin and reports no bleeding/bruising.     As of today, patient denies any pain or new wounds in his lower extremities. No significant edema. Denies dyspnea with exertion. Denies chest pain. Still using his walker, but hopeful to  transition to just a cane for now. Able to ambulate 178 steps, up to 20 minutes, without needing to stop to rest.     PAST MEDICAL HISTORY:  Past Medical History:   Diagnosis Date     Advanced open-angle glaucoma      Atrial fibrillation (H)      CKD (chronic kidney disease), stage III 2005     Colon cancer (H)     Stage II-B colon cancer     Coronary artery disease     s/p CABG x 2, JEREMY x 2     Diverticulitis      Hyperlipidemia      Hypertension      Ischemic cardiomyopathy      MGUS (monoclonal gammopathy of unknown significance)      Nonsenile cataract     BE     Osteoporosis     left hip fracture     Polymorphic ventricular tachycardia (H)      Polymyalgia rheumatica (H)      PVD (posterior vitreous detachment), both eyes 2005     S/P ablation of atrial flutter 6/20/14    CTI     Stented coronary artery      SVT (supraventricular tachycardia) (H)     PPM/AICD for NSVT     Upper leg DVT (deep venous thromboembolism), chronic (H)     Left     Weight loss, unintentional 2017    15 lb in 4 months       PAST SURGICAL HISTORY:  Past Surgical History:   Procedure Laterality Date     C CABG, ARTERY-VEIN, FOUR  2006    LIMA-LAD, SVG-Rt PDA, SVG-OM2, SVG-Diag 1     C CABG, VEIN, SINGLE  1994    1-vessel CABG, SVG->PDRCA      CATARACT IOL, RT/LT Bilateral      COLONOSCOPY  3/13/2014    Procedure: COMBINED COLONOSCOPY, SINGLE BIOPSY/POLYPECTOMY BY BIOPSY;;  Surgeon: Mary Gerber MD;  Location:  GI     COLONOSCOPY N/A 6/22/2015    Procedure: COLONOSCOPY;  Surgeon: Marilin Newman MD;  Location:  GI     COLONOSCOPY N/A 11/7/2018    Procedure: COMBINED COLONOSCOPY, SINGLE OR MULTIPLE BIOPSY/POLYPECTOMY BY BIOPSY;  Surgeon: Roberto Esteban MD;  Location:  GI     EP ICD GENERATOR CHANGE DUAL N/A 12/11/2018    Procedure: EP ICD Generator Change Dual;  Surgeon: Deedee Baird MD;  Location:  HEART CARDIAC CATH LAB     H ABLATION ATRIAL FLUTTER       HEART CATH DRUG ELUTING STENT PLACEMENT   2012    JEREMY x 2 to LCx     IMPLANT IMPLANTABLE CARDIOVERTER DEFIBRILLATOR  2012    ppm/aicd     KNEE SURGERY      right and left knee surgeries     LAPAROSCOPIC ASSISTED COLECTOMY Right 2019    Procedure: Laparoscopic Converted to Open Transverse Colectomy with Lysis of Adhesions;  Surgeon: Chanelle Guzmán MD;  Location: [facility] OR     LAPAROSCOPIC ASSISTED COLECTOMY LEFT (DESCENDING)  2014    Procedure: LAPAROSCOPIC ASSISTED COLECTOMY LEFT (DESCENDING);  Hand assisted Laparoscopic Sigmoid Colectomy , Laparoscopic mobilization of spleenic flexure *Latex Allergy*Anesthesia General with Block;  Surgeon: Chanelle Guzmán MD;  Location: [facility] OR     OPEN REDUCTION INTERNAL FIXATION RODDING INTRAMEDULLAR FEMUR FRACTURE TABLE Left 3/14/2019    Procedure: Open Reduction Internal Fixation Left Femur Intramedullar Nailing;  Surgeon: Srikanth Avalos MD;  Location: [facility] OR     REPAIR VALVE MITRAL  2006     30-mm Medtronic Julian ring      SELECTIVE LASER TRABECULOPLASTY (SLT) OD (RIGHT EYE)  4/10, ,+13    RE     SELECTIVE LASER TRABECULOPLASTY (SLT) OS (LEFT EYE)  2012     SHOULDER SURGERY      right rotator cuff       FAMILY HISTORY:  Family History   Problem Relation Age of Onset     C.A.D. Father      Anesthesia Reaction No family hx of      Crohn's Disease No family hx of      Ulcerative Colitis No family hx of      Cancer - colorectal No family hx of      Macular Degeneration No family hx of      Cancer No family hx of         No known family hx of skin cancer     Melanoma No family hx of      Skin Cancer No family hx of        SOCIAL HISTORY:  Social History     Tobacco Use     Smoking status: Former Smoker     Types: Cigarettes, Cigars     Quit date: 1968     Years since quittin.1     Smokeless tobacco: Never Used   Substance Use Topics     Alcohol use: Yes     Alcohol/week: 0.0 standard drinks     Comment: 2-3 drinks twice weekly     Drug use: No        ALLERGIES:     Allergies   Allergen Reactions     Latex Rash     Rash       CURRENT MEDICATIONS:  Current Outpatient Medications   Medication Sig Dispense Refill     alendronate (FOSAMAX) 70 MG tablet Take 1 tablet (70 mg) by mouth every 7 days Take with a full glass of water and do not eat or lay down for 30 minutes 12 tablet 3     ARIPiprazole (ABILIFY) 2 MG tablet Take 2 mg by mouth daily       atorvastatin (LIPITOR) 40 MG tablet Take 1 tablet (40 mg) by mouth daily 90 tablet 3     calcium carbonate 500 mg, elemental, (OSCAL;OYSTER SHELL CALCIUM) 500 MG tablet Take 1 tablet (500 mg) by mouth 2 times daily 180 tablet 3     cholecalciferol 1000 units TABS Take 1,000 Units by mouth daily        COMPRESSION STOCKINGS 1 each daily 1 each 4     Cyanocobalamin (VITAMIN B-12 CR PO)        ferrous sulfate (FE TABS) 325 (65 Fe) MG EC tablet Take 325 mg by mouth daily       ketoconazole (NIZORAL) 2 % external cream Apply to the feet 2 times a day until rash clears then 3-4 times a week for maintenance 60 g 3     metoprolol tartrate (LOPRESSOR) 25 MG tablet TAKE 1 TABLET BY MOUTH TWO TIMES A  tablet 1     mirtazapine (REMERON) 15 MG tablet Take 15 mg by mouth At Bedtime.       Multiple Vitamins-Minerals (MULTIVITAMIN ADULT PO)        sertraline (ZOLOFT) 100 MG tablet Take 150 mg by mouth every evening        tamsulosin (FLOMAX) 0.4 MG capsule Take 2 capsules (0.8 mg) by mouth daily 180 capsule 2     terbinafine (LAMISIL) 1 % external cream Apply twice daily as needed for rash on feet until resolved 84 g 11     tiZANidine (ZANAFLEX) 2 MG tablet Take 1-2 tablets (2-4 mg) by mouth 3 times daily as needed for muscle spasms 30 tablet 0     warfarin ANTICOAGULANT (COUMADIN) 5 MG tablet Take 1 to 1.5 tablets daily or as directed by the Coumadin clinic 90 tablet 5       ROS:   12-point ROS otherwise negative except as noted in HPI    Exam:  There were no vitals taken for this visit.  GENERAL APPEARANCE: healthy, alert  and no distress  HEENT: EOMI  NECK: no adenopathy, no asymmetry, masses, or scars, thyroid normal to palpation and no bruits, JVP not elevated  RESPIRATORY: lungs clear to auscultation - no rales, rhonchi or wheezes, no use of accessory muscles, no retractions, respirations are unlabored, normal respiratory rate  CARDIOVASCULAR: regular rhythm, normal S1 with physiologic split S2, precordium quiet with normal PMI.  ABDOMEN: soft, non tender, without hepatosplenomegaly, no masses palpable, bowel sounds normal, aorta not enlarged by palpation, no abdominal bruits  EXTREMITIES: warm to touch, 1+ pitting edema on L lower leg, bilateral popliteal pulses palpable, bilateral PT/DP pulses dopperable  NEURO: alert and oriented to person/place/time, normal speech, gait and affect  VASC: Radial, femoral, dorsalis pedis and posterior tibialis pulses are normal in volumes and symmetric bilaterally. No bruits are heard.  SKIN: Ulcer was noted in the left foot. It was c/d/i.    Labs:  Recent Labs   Lab Test 11/23/20  1637 08/21/20  1642   WBC 7.1 6.8   HGB 11.4* 11.3*   HCT 36.0* 35.9*    164     Recent Labs   Lab Test 11/23/20  1637 08/21/20  1642    141   POTASSIUM 4.4 4.5   CHLORIDE 107 111*   CO2 27 25   BUN 44* 47*   CR 1.42* 1.54*   GFRESTIMATED 45* 40*     Recent Labs   Lab Test 08/12/19  1045 01/15/18  1224 09/16/15  1238 09/16/15  1238 05/22/14  1455   CHOL 146 119   < > 136 138   HDL 61 58   < > 43 45   LDL 55 46   < > 72 68   TRIG 150* 76   < > 105 123   CHOLHDLRATIO  --   --   --  3.2 3.0    < > = values in this interval not displayed.     VENOUS US, BLE:  (Nov 2017)  1. Right leg: Incompetence of right common femoral vein, residual right great saphenous vein in the proximal and mid thigh and incompetent right saphenofemoral junction. The great saphenous vein dimensions in this location may be from 2 to 6 mm. Incompetent  vein measuring 4 mm in the leg four centimeter from the medial malleolus,  which connects to a competent tributary of the great saphenous vein. No varicose veins.     2. Left leg: Incompetence of the common femoral vein, femoral vein in the distal thigh and popliteal vein. Incompetent saphenofemoral junction, greater saphenous vein in the proximal calf. The diameter of the greater saphenous vein about the SFJ varies from 4.9 to 7.2 mm and 3 to 4.4 mm in the region of the knee/proximal calf. 2 incompetent perforators in the lower leg, measuring up to 2.7 and 2.3 cm. No varicose veins.      ABIs (Dec 2019):   Right:   DELFINO: 1.35   TBI: 1.03  Left:  DELFINO: Noncompressible   TBI: 1.17     ARTERIAL US, BLE  (Dec 2019):  Impression:   1. Right leg: Widely patent arteries with no evidence of occlusion or hemodynamically significant stenosis by velocity or waveform criteria.  2. Left leg: .Elevated velocity in the proximal SFA of 153 cm/s with velocity ratio of slightly greater than 2, suggestive of 20-49% stenosis.  Monophasic waveform in the distal PTA, without evidence of proximal occlusion or stenosis in this vessel, is of uncertain significance but can be seen when there is inflammation in the foot.    Assessment and Plan:   85 year old male h/o DVT 4/2014 and 7/2019, PAD, CAD s/p CABG x2, JEREMY x2, VT s/p ICD, AF/AFL s/p ablation and LLA closure, HTN, HLD and CKD3 who presents for follow up.     1. Chronic venous insufficiency - no major interventions today, re-prescribed compression stockings, 30-40mmHg for both legs.  2. DVT on warfarin, INR goal 1.5-2.5 - Continue for now, no major bleeding issues  3. CAD s/p CABG and PCI - On Atorvastatin 40 mg daily. Was taken off of Aspirin due to recurrent falls, and the fact that he is Warfarin for prior DVTs.  4. PAD (R. DELFINO: 1.35; TBI: 1.03; L DELFINO: Non compressible; TBI: 1.17). -> Continue Atorvastatin 40 mg daily. Was taken off of Aspirin due to recurrent falls, and the fact that he is Warfarin for prior DVTs.  5. Hypertension - Controlled, off  medications for now  6. Hyperlipidemia - Controlled with Atorvastatin 40, LDL was 55 on 8/2019    Yadira Goodwin MD   Cardiology fellow  Pager: [phone]    ATTENDING NOTE:  Patient has been seen and evaluated by me on 02/02/2021. I have reviewed the documentation above.  I have reviewed today's vital signs, medications, labs, and imaging results.  I have reviewed and edited, as necessary, the history, review of systems, physical examination, and assessment and plan.  I have discussed my assessment and plan with the cardiology fellow.  Noe Florence is a 85 year old male with risk factor profile (+) HTN, (-) DM, (+) hypercholesterolemia, (-) tobacco use, (-) fam Hx premature CAD, returns for routine evaluation of chronic LE edema.  The patient has Hx of CAD with prior 2V CABG and mitral valvuloplasty ring, and PCI as well as VT s/p ICD, preserved LVEF 55-60% (ECHO, 2017), AF/AFl s/p ablation and LAAC, Stage III CKD, PAD by arterial US (2019), returns for routine follow up.  The patient has chronic venous insufficiency bilaterally by venous US (2017):    Right leg: Incompetence of right common femoral vein, residual right great saphenous vein in the proximal and mid thigh and incompetent right saphenofemoral junction. The great saphenous vein dimensions in this location may be from 2 to 6 mm. Incompetent  vein measuring 4 mm in the leg four centimeter from the medial malleolus, which connects to a competent tributary of the great saphenous vein. No varicose veins.     Left leg: Incompetence of the common femoral vein, femoral vein in the distal thigh and popliteal vein. Incompetent saphenofemoral junction, greater saphenous vein in the proximal calf. The diameter of the greater saphenous vein about the SFJ varies from 4.9 to 7.2 mm and 3 to 4.4 mm in the region of the knee/proximal calf. 2 incompetent perforators in the lower leg, measuring up to 2.7 and 2.3 cm. No varicose veins.      The patient is using  compression stockings, 30-40 mm Hg, with symptomatic improvement.   The patient has Hx of multiple DVTs and remains on Couamdin.  ASAS stopped due to recurrent falls (2020).   Based on advanced age and symptomatic improvement, I would recommend continuation with the compression stockings.    Octaviano Santos MD     Cardiovascular Division

## 2021-02-02 ENCOUNTER — OFFICE VISIT (OUTPATIENT)
Dept: CARDIOLOGY | Facility: CLINIC | Age: 86
End: 2021-02-02
Attending: INTERNAL MEDICINE
Payer: COMMERCIAL

## 2021-02-02 VITALS
OXYGEN SATURATION: 99 % | HEART RATE: 60 BPM | WEIGHT: 156 LBS | HEIGHT: 67 IN | BODY MASS INDEX: 24.48 KG/M2 | SYSTOLIC BLOOD PRESSURE: 134 MMHG | DIASTOLIC BLOOD PRESSURE: 66 MMHG

## 2021-02-02 DIAGNOSIS — I48.0 PAROXYSMAL ATRIAL FIBRILLATION (H): ICD-10-CM

## 2021-02-02 DIAGNOSIS — I87.2 CHRONIC VENOUS INSUFFICIENCY: Primary | ICD-10-CM

## 2021-02-02 DIAGNOSIS — I47.20 VENTRICULAR TACHYARRHYTHMIA (H): ICD-10-CM

## 2021-02-02 DIAGNOSIS — Z95.810 ICD (IMPLANTABLE CARDIOVERTER-DEFIBRILLATOR) IN PLACE: ICD-10-CM

## 2021-02-02 PROCEDURE — 99214 OFFICE O/P EST MOD 30 MIN: CPT | Mod: 25 | Performed by: INTERNAL MEDICINE

## 2021-02-02 PROCEDURE — 93283 PRGRMG EVAL IMPLANTABLE DFB: CPT | Performed by: INTERNAL MEDICINE

## 2021-02-02 PROCEDURE — G0463 HOSPITAL OUTPT CLINIC VISIT: HCPCS

## 2021-02-02 ASSESSMENT — MIFFLIN-ST. JEOR: SCORE: 1351.24

## 2021-02-02 ASSESSMENT — PAIN SCALES - GENERAL: PAINLEVEL: NO PAIN (0)

## 2021-02-02 NOTE — PATIENT INSTRUCTIONS
Patient Instructions:  It was a pleasure to see you in the cardiology clinic today.      If you have any questions, call  Anais Raymond RN, at [phone].  Press Option #1 for the [facility], and then press Option #4  We are encouraging the use of [employer] to communicate with your HealthCare Provider    Note the new medications: none  Stop the following medications: none    The results from today include: none  Please follow up with Dr. Octaviano Santos in one year      If you have an urgent need after hours (8:00 am to 4:30 pm) please call [phone] and ask for the cardiology fellow on call.

## 2021-02-02 NOTE — LETTER
2/2/2021      RE: Noe Florence  [address] [facility] [address]       Dear Colleague,    Thank you for the opportunity to participate in the care of your patient, Noe Florence, at the [facility] at [facility]. Please see a copy of my visit note below.    [facility] CARDIOLOGY FOLLOW UP VISIT:    HPI: Noe Florence is a 85 year old male with a past medical history significant for DVT in 4/2014 and then 7/2019, CAD s/p CABG x2, DESx2, polymorphic VT s/p ICD placement (5/2012, gen change 12/2018), and AF/AFL s/p CTI (6/2014) and right atrial appendage atrial tachycardia ablation (9/2014), HTN, HLD, and CKD3.     Cardiac risk factors include (+) hypertension, (+) hypercholesterolemia , (-) diabetes, (+) family Hx CAD, (-) tobacco, (-) cerebrovascular disease, (+) PAD.     He underwent colorectal surgery for colon cancer in February 2019.  In March 2019, patient fell and suffered a left hip fracture and underwent surgery.  In May 2019 patient fell again and suffered a distal right humerus fracture. With this fall history, his anticoagulation was stopped as Mr. Florence and his wife considered left atrial appendage occlusive device. He has a history of left femoral vein DVT several years ago but 3 weeks ago developed left LE edema again. He was evaluated in the ED on 7/26/19 and diagnosed with an occlusive popliteal vein DVT on ultrasound. He was started on lovenox and restarted on warfarin. Goal INR is between 1.5-2.5.     Patient is a long standing , who played vigorously up until 80 years old from a young age. Only when he stopped playing tennis did he discover progressive swelling. He was prescribed compression stockings (20-30 mmHg) however these were too large for him and would not stay up appropriately. His swelling was off and on for 2 years. Has an open wound for which gets wound care and dressings. Also has neuropathy in  leg with some skin tenderness and redness for which he has been seeing dermatology. He is chronically on warfarin and reports no bleeding/bruising.     As of today, patient denies any pain or new wounds in his lower extremities. No significant edema. Denies dyspnea with exertion. Denies chest pain. Still using his walker, but hopeful to transition to just a cane for now. Able to ambulate 178 steps, up to 20 minutes, without needing to stop to rest.     PAST MEDICAL HISTORY:  Past Medical History:   Diagnosis Date     Advanced open-angle glaucoma      Atrial fibrillation (H)      CKD (chronic kidney disease), stage III 2005     Colon cancer (H)     Stage II-B colon cancer     Coronary artery disease     s/p CABG x 2, JEREMY x 2     Diverticulitis      Hyperlipidemia      Hypertension      Ischemic cardiomyopathy      MGUS (monoclonal gammopathy of unknown significance)      Nonsenile cataract     BE     Osteoporosis     left hip fracture     Polymorphic ventricular tachycardia (H)      Polymyalgia rheumatica (H)      PVD (posterior vitreous detachment), both eyes 2005     S/P ablation of atrial flutter 6/20/14    CTI     Stented coronary artery      SVT (supraventricular tachycardia) (H)     PPM/AICD for NSVT     Upper leg DVT (deep venous thromboembolism), chronic (H)     Left     Weight loss, unintentional 2017    15 lb in 4 months       PAST SURGICAL HISTORY:  Past Surgical History:   Procedure Laterality Date     C CABG, ARTERY-VEIN, FOUR  2006    LIMA-LAD, SVG-Rt PDA, SVG-OM2, SVG-Diag 1     C CABG, VEIN, SINGLE  1994    1-vessel CABG, SVG->PDRCA      CATARACT IOL, RT/LT Bilateral      COLONOSCOPY  3/13/2014    Procedure: COMBINED COLONOSCOPY, SINGLE BIOPSY/POLYPECTOMY BY BIOPSY;;  Surgeon: Mary Gerber MD;  Location:  GI     COLONOSCOPY N/A 6/22/2015    Procedure: COLONOSCOPY;  Surgeon: Marilin Newman MD;  Location:  GI     COLONOSCOPY N/A 11/7/2018    Procedure: COMBINED COLONOSCOPY, SINGLE  OR MULTIPLE BIOPSY/POLYPECTOMY BY BIOPSY;  Surgeon: Roberto Esteban MD;  Location:  GI     EP ICD GENERATOR CHANGE DUAL N/A 12/11/2018    Procedure: EP ICD Generator Change Dual;  Surgeon: Deedee Baird MD;  Location:  HEART CARDIAC CATH LAB     H ABLATION ATRIAL FLUTTER       HEART CATH DRUG ELUTING STENT PLACEMENT  4/19/2012    JEREMY x 2 to LCx     IMPLANT IMPLANTABLE CARDIOVERTER DEFIBRILLATOR  5-    ppm/aicd     KNEE SURGERY      right and left knee surgeries     LAPAROSCOPIC ASSISTED COLECTOMY Right 2/1/2019    Procedure: Laparoscopic Converted to Open Transverse Colectomy with Lysis of Adhesions;  Surgeon: Chanelle Guzmán MD;  Location:  OR     LAPAROSCOPIC ASSISTED COLECTOMY LEFT (DESCENDING)  4/8/2014    Procedure: LAPAROSCOPIC ASSISTED COLECTOMY LEFT (DESCENDING);  Hand assisted Laparoscopic Sigmoid Colectomy , Laparoscopic mobilization of spleenic flexure *Latex Allergy*Anesthesia General with Block;  Surgeon: Chanelle Guzmán MD;  Location: [facility]     OPEN REDUCTION INTERNAL FIXATION RODDING INTRAMEDULLAR FEMUR FRACTURE TABLE Left 3/14/2019    Procedure: Open Reduction Internal Fixation Left Femur Intramedullar Nailing;  Surgeon: Srikanth Avalos MD;  Location: [facility] OR     REPAIR VALVE MITRAL  2006     30-mm Medtronic Julian ring      SELECTIVE LASER TRABECULOPLASTY (SLT) OD (RIGHT EYE)  4/10, 1/12,+1/9/13    RE     SELECTIVE LASER TRABECULOPLASTY (SLT) OS (LEFT EYE)  5/2012     SHOULDER SURGERY      right rotator cuff       FAMILY HISTORY:  Family History   Problem Relation Age of Onset     C.A.D. Father      Anesthesia Reaction No family hx of      Crohn's Disease No family hx of      Ulcerative Colitis No family hx of      Cancer - colorectal No family hx of      Macular Degeneration No family hx of      Cancer No family hx of         No known family hx of skin cancer     Melanoma No family hx of      Skin Cancer No family hx of        SOCIAL  HISTORY:  Social History     Tobacco Use     Smoking status: Former Smoker     Types: Cigarettes, Cigars     Quit date: 1968     Years since quittin.1     Smokeless tobacco: Never Used   Substance Use Topics     Alcohol use: Yes     Alcohol/week: 0.0 standard drinks     Comment: 2-3 drinks twice weekly     Drug use: No       ALLERGIES:     Allergies   Allergen Reactions     Latex Rash     Rash       CURRENT MEDICATIONS:  Current Outpatient Medications   Medication Sig Dispense Refill     alendronate (FOSAMAX) 70 MG tablet Take 1 tablet (70 mg) by mouth every 7 days Take with a full glass of water and do not eat or lay down for 30 minutes 12 tablet 3     ARIPiprazole (ABILIFY) 2 MG tablet Take 2 mg by mouth daily       atorvastatin (LIPITOR) 40 MG tablet Take 1 tablet (40 mg) by mouth daily 90 tablet 3     calcium carbonate 500 mg, elemental, (OSCAL;OYSTER SHELL CALCIUM) 500 MG tablet Take 1 tablet (500 mg) by mouth 2 times daily 180 tablet 3     cholecalciferol 1000 units TABS Take 1,000 Units by mouth daily        COMPRESSION STOCKINGS 1 each daily 1 each 4     Cyanocobalamin (VITAMIN B-12 CR PO)        ferrous sulfate (FE TABS) 325 (65 Fe) MG EC tablet Take 325 mg by mouth daily       ketoconazole (NIZORAL) 2 % external cream Apply to the feet 2 times a day until rash clears then 3-4 times a week for maintenance 60 g 3     metoprolol tartrate (LOPRESSOR) 25 MG tablet TAKE 1 TABLET BY MOUTH TWO TIMES A  tablet 1     mirtazapine (REMERON) 15 MG tablet Take 15 mg by mouth At Bedtime.       Multiple Vitamins-Minerals (MULTIVITAMIN ADULT PO)        sertraline (ZOLOFT) 100 MG tablet Take 150 mg by mouth every evening        tamsulosin (FLOMAX) 0.4 MG capsule Take 2 capsules (0.8 mg) by mouth daily 180 capsule 2     terbinafine (LAMISIL) 1 % external cream Apply twice daily as needed for rash on feet until resolved 84 g 11     tiZANidine (ZANAFLEX) 2 MG tablet Take 1-2 tablets (2-4 mg) by mouth 3 times  daily as needed for muscle spasms 30 tablet 0     warfarin ANTICOAGULANT (COUMADIN) 5 MG tablet Take 1 to 1.5 tablets daily or as directed by the Coumadin clinic 90 tablet 5       ROS:   12-point ROS otherwise negative except as noted in HPI    Exam:  There were no vitals taken for this visit.  GENERAL APPEARANCE: healthy, alert and no distress  HEENT: EOMI  NECK: no adenopathy, no asymmetry, masses, or scars, thyroid normal to palpation and no bruits, JVP not elevated  RESPIRATORY: lungs clear to auscultation - no rales, rhonchi or wheezes, no use of accessory muscles, no retractions, respirations are unlabored, normal respiratory rate  CARDIOVASCULAR: regular rhythm, normal S1 with physiologic split S2, precordium quiet with normal PMI.  ABDOMEN: soft, non tender, without hepatosplenomegaly, no masses palpable, bowel sounds normal, aorta not enlarged by palpation, no abdominal bruits  EXTREMITIES: warm to touch, 1+ pitting edema on L lower leg, bilateral popliteal pulses palpable, bilateral PT/DP pulses dopperable  NEURO: alert and oriented to person/place/time, normal speech, gait and affect  VASC: Radial, femoral, dorsalis pedis and posterior tibialis pulses are normal in volumes and symmetric bilaterally. No bruits are heard.  SKIN: Ulcer was noted in the left foot. It was c/d/i.    Labs:  Recent Labs   Lab Test 11/23/20  1637 08/21/20  1642   WBC 7.1 6.8   HGB 11.4* 11.3*   HCT 36.0* 35.9*    164     Recent Labs   Lab Test 11/23/20  1637 08/21/20  1642    141   POTASSIUM 4.4 4.5   CHLORIDE 107 111*   CO2 27 25   BUN 44* 47*   CR 1.42* 1.54*   GFRESTIMATED 45* 40*     Recent Labs   Lab Test 08/12/19  1045 01/15/18  1224 09/16/15  1238 09/16/15  1238 05/22/14  1455   CHOL 146 119   < > 136 138   HDL 61 58   < > 43 45   LDL 55 46   < > 72 68   TRIG 150* 76   < > 105 123   CHOLHDLRATIO  --   --   --  3.2 3.0    < > = values in this interval not displayed.     VENOUS US, BLE:  (Nov 2017)  1. Right leg:  Incompetence of right common femoral vein, residual right great saphenous vein in the proximal and mid thigh and incompetent right saphenofemoral junction. The great saphenous vein dimensions in this location may be from 2 to 6 mm. Incompetent  vein measuring 4 mm in the leg four centimeter from the medial malleolus, which connects to a competent tributary of the great saphenous vein. No varicose veins.     2. Left leg: Incompetence of the common femoral vein, femoral vein in the distal thigh and popliteal vein. Incompetent saphenofemoral junction, greater saphenous vein in the proximal calf. The diameter of the greater saphenous vein about the SFJ varies from 4.9 to 7.2 mm and 3 to 4.4 mm in the region of the knee/proximal calf. 2 incompetent perforators in the lower leg, measuring up to 2.7 and 2.3 cm. No varicose veins.      ABIs (Dec 2019):   Right:   DELFINO: 1.35   TBI: 1.03  Left:  DELFINO: Noncompressible   TBI: 1.17     ARTERIAL US, BLE  (Dec 2019):  Impression:   1. Right leg: Widely patent arteries with no evidence of occlusion or hemodynamically significant stenosis by velocity or waveform criteria.  2. Left leg: .Elevated velocity in the proximal SFA of 153 cm/s with velocity ratio of slightly greater than 2, suggestive of 20-49% stenosis.  Monophasic waveform in the distal PTA, without evidence of proximal occlusion or stenosis in this vessel, is of uncertain significance but can be seen when there is inflammation in the foot.    Assessment and Plan:   85 year old male h/o DVT 4/2014 and 7/2019, PAD, CAD s/p CABG x2, JEREMY x2, VT s/p ICD, AF/AFL s/p ablation and LLA closure, HTN, HLD and CKD3 who presents for follow up.     1. Chronic venous insufficiency - no major interventions today, re-prescribed compression stockings, 30-40mmHg for both legs.  2. DVT on warfarin, INR goal 1.5-2.5 - Continue for now, no major bleeding issues  3. CAD s/p CABG and PCI - On Atorvastatin 40 mg daily. Was taken off of  Aspirin due to recurrent falls, and the fact that he is Warfarin for prior DVTs.  4. PAD (R. DELFINO: 1.35; TBI: 1.03; L DELFINO: Non compressible; TBI: 1.17). -> Continue Atorvastatin 40 mg daily. Was taken off of Aspirin due to recurrent falls, and the fact that he is Warfarin for prior DVTs.  5. Hypertension - Controlled, off medications for now  6. Hyperlipidemia - Controlled with Atorvastatin 40, LDL was 55 on 8/2019    Yadira Goodwin MD   Cardiology fellow  Pager: [phone]    ATTENDING NOTE:  Patient has been seen and evaluated by me on 02/02/2021. I have reviewed the documentation above.  I have reviewed today's vital signs, medications, labs, and imaging results.  I have reviewed and edited, as necessary, the history, review of systems, physical examination, and assessment and plan.  I have discussed my assessment and plan with the cardiology fellow.  Noe Florence is a 85 year old male with risk factor profile (+) HTN, (-) DM, (+) hypercholesterolemia, (-) tobacco use, (-) fam Hx premature CAD, returns for routine evaluation of chronic LE edema.  The patient has Hx of CAD with prior 2V CABG and mitral valvuloplasty ring, and PCI as well as VT s/p ICD, preserved LVEF 55-60% (ECHO, 2017), AF/AFl s/p ablation and LAAC, Stage III CKD, PAD by arterial US (2019), returns for routine follow up.  The patient has chronic venous insufficiency bilaterally by venous US (2017):    Right leg: Incompetence of right common femoral vein, residual right great saphenous vein in the proximal and mid thigh and incompetent right saphenofemoral junction. The great saphenous vein dimensions in this location may be from 2 to 6 mm. Incompetent  vein measuring 4 mm in the leg four centimeter from the medial malleolus, which connects to a competent tributary of the great saphenous vein. No varicose veins.     Left leg: Incompetence of the common femoral vein, femoral vein in the distal thigh and popliteal vein. Incompetent  saphenofemoral junction, greater saphenous vein in the proximal calf. The diameter of the greater saphenous vein about the SFJ varies from 4.9 to 7.2 mm and 3 to 4.4 mm in the region of the knee/proximal calf. 2 incompetent perforators in the lower leg, measuring up to 2.7 and 2.3 cm. No varicose veins.      The patient is using compression stockings, 30-40 mm Hg, with symptomatic improvement.   The patient has Hx of multiple DVTs and remains on Couamdin.  ASAS stopped due to recurrent falls (2020).   Based on advanced age and symptomatic improvement, I would recommend continuation with the compression stockings.    Octaviano Santos MD     Cardiovascular Division            Please do not hesitate to contact me if you have any questions/concerns.     Sincerely,     Octaviano Santos MD

## 2021-02-02 NOTE — PATIENT INSTRUCTIONS
It was a pleasure to see you in clinic today.  Please do not hesitate to call with any questions or concerns.  You are scheduled for a remote transmission on 5/4/21.  We look forward to seeing you in clinic at your next device check in 6 months.    Juanito Woodard, RN  Electrophysiology Nurse Clinician  [facility]    During Business Hours Please Call:  [phone]  After Hours Please Call:  [phone] - select option #4 and ask for job code [phone]

## 2021-02-03 ENCOUNTER — IMMUNIZATION (OUTPATIENT)
Dept: NURSING | Facility: CLINIC | Age: 86
End: 2021-02-03
Payer: COMMERCIAL

## 2021-02-03 PROCEDURE — 0001A PR COVID VAC PFIZER DIL RECON 30 MCG/0.3 ML IM: CPT

## 2021-02-03 PROCEDURE — 91300 PR COVID VAC PFIZER DIL RECON 30 MCG/0.3 ML IM: CPT

## 2021-02-04 LAB
MDC_IDC_EPISODE_DTM: NORMAL
MDC_IDC_EPISODE_DURATION: 2 S
MDC_IDC_EPISODE_ID: 65
MDC_IDC_EPISODE_TYPE: NORMAL
MDC_IDC_LEAD_IMPLANT_DT: NORMAL
MDC_IDC_LEAD_IMPLANT_DT: NORMAL
MDC_IDC_LEAD_LOCATION: NORMAL
MDC_IDC_LEAD_LOCATION: NORMAL
MDC_IDC_LEAD_LOCATION_DETAIL_1: NORMAL
MDC_IDC_LEAD_LOCATION_DETAIL_1: NORMAL
MDC_IDC_LEAD_MFG: NORMAL
MDC_IDC_LEAD_MFG: NORMAL
MDC_IDC_LEAD_MODEL: NORMAL
MDC_IDC_LEAD_MODEL: NORMAL
MDC_IDC_LEAD_POLARITY_TYPE: NORMAL
MDC_IDC_LEAD_POLARITY_TYPE: NORMAL
MDC_IDC_LEAD_SERIAL: NORMAL
MDC_IDC_LEAD_SERIAL: NORMAL
MDC_IDC_MSMT_BATTERY_DTM: NORMAL
MDC_IDC_MSMT_BATTERY_REMAINING_LONGEVITY: 94 MO
MDC_IDC_MSMT_BATTERY_RRT_TRIGGER: 2.73
MDC_IDC_MSMT_BATTERY_STATUS: NORMAL
MDC_IDC_MSMT_BATTERY_VOLTAGE: 2.97 V
MDC_IDC_MSMT_CAP_CHARGE_DTM: NORMAL
MDC_IDC_MSMT_CAP_CHARGE_ENERGY: 18 J
MDC_IDC_MSMT_CAP_CHARGE_TIME: 3.77
MDC_IDC_MSMT_CAP_CHARGE_TYPE: NORMAL
MDC_IDC_MSMT_LEADCHNL_RA_IMPEDANCE_VALUE: 475 OHM
MDC_IDC_MSMT_LEADCHNL_RA_PACING_THRESHOLD_AMPLITUDE: 0.5 V
MDC_IDC_MSMT_LEADCHNL_RA_PACING_THRESHOLD_PULSEWIDTH: 0.4 MS
MDC_IDC_MSMT_LEADCHNL_RA_SENSING_INTR_AMPL: 0.8 MV
MDC_IDC_MSMT_LEADCHNL_RV_IMPEDANCE_VALUE: 304 OHM
MDC_IDC_MSMT_LEADCHNL_RV_IMPEDANCE_VALUE: 361 OHM
MDC_IDC_MSMT_LEADCHNL_RV_PACING_THRESHOLD_AMPLITUDE: 1 V
MDC_IDC_MSMT_LEADCHNL_RV_PACING_THRESHOLD_PULSEWIDTH: 0.4 MS
MDC_IDC_MSMT_LEADCHNL_RV_SENSING_INTR_AMPL: 8.5 MV
MDC_IDC_PG_IMPLANT_DTM: NORMAL
MDC_IDC_PG_MFG: NORMAL
MDC_IDC_PG_MODEL: NORMAL
MDC_IDC_PG_SERIAL: NORMAL
MDC_IDC_PG_TYPE: NORMAL
MDC_IDC_SESS_CLINIC_NAME: NORMAL
MDC_IDC_SESS_DTM: NORMAL
MDC_IDC_SESS_TYPE: NORMAL
MDC_IDC_SET_BRADY_AT_MODE_SWITCH_RATE: 171 {BEATS}/MIN
MDC_IDC_SET_BRADY_HYSTRATE: NORMAL
MDC_IDC_SET_BRADY_LOWRATE: 60 {BEATS}/MIN
MDC_IDC_SET_BRADY_MAX_SENSOR_RATE: 130 {BEATS}/MIN
MDC_IDC_SET_BRADY_MAX_TRACKING_RATE: 130 {BEATS}/MIN
MDC_IDC_SET_BRADY_MODE: NORMAL
MDC_IDC_SET_BRADY_PAV_DELAY_LOW: 180 MS
MDC_IDC_SET_BRADY_SAV_DELAY_LOW: 150 MS
MDC_IDC_SET_LEADCHNL_RA_PACING_AMPLITUDE: 1.5 V
MDC_IDC_SET_LEADCHNL_RA_PACING_ANODE_ELECTRODE_1: NORMAL
MDC_IDC_SET_LEADCHNL_RA_PACING_ANODE_LOCATION_1: NORMAL
MDC_IDC_SET_LEADCHNL_RA_PACING_CAPTURE_MODE: NORMAL
MDC_IDC_SET_LEADCHNL_RA_PACING_CATHODE_ELECTRODE_1: NORMAL
MDC_IDC_SET_LEADCHNL_RA_PACING_CATHODE_LOCATION_1: NORMAL
MDC_IDC_SET_LEADCHNL_RA_PACING_POLARITY: NORMAL
MDC_IDC_SET_LEADCHNL_RA_PACING_PULSEWIDTH: 0.4 MS
MDC_IDC_SET_LEADCHNL_RA_SENSING_ANODE_ELECTRODE_1: NORMAL
MDC_IDC_SET_LEADCHNL_RA_SENSING_ANODE_LOCATION_1: NORMAL
MDC_IDC_SET_LEADCHNL_RA_SENSING_CATHODE_ELECTRODE_1: NORMAL
MDC_IDC_SET_LEADCHNL_RA_SENSING_CATHODE_LOCATION_1: NORMAL
MDC_IDC_SET_LEADCHNL_RA_SENSING_POLARITY: NORMAL
MDC_IDC_SET_LEADCHNL_RA_SENSING_SENSITIVITY: 0.3 MV
MDC_IDC_SET_LEADCHNL_RV_PACING_AMPLITUDE: 2 V
MDC_IDC_SET_LEADCHNL_RV_PACING_ANODE_ELECTRODE_1: NORMAL
MDC_IDC_SET_LEADCHNL_RV_PACING_ANODE_LOCATION_1: NORMAL
MDC_IDC_SET_LEADCHNL_RV_PACING_CAPTURE_MODE: NORMAL
MDC_IDC_SET_LEADCHNL_RV_PACING_CATHODE_ELECTRODE_1: NORMAL
MDC_IDC_SET_LEADCHNL_RV_PACING_CATHODE_LOCATION_1: NORMAL
MDC_IDC_SET_LEADCHNL_RV_PACING_POLARITY: NORMAL
MDC_IDC_SET_LEADCHNL_RV_PACING_PULSEWIDTH: 0.4 MS
MDC_IDC_SET_LEADCHNL_RV_SENSING_ANODE_ELECTRODE_1: NORMAL
MDC_IDC_SET_LEADCHNL_RV_SENSING_ANODE_LOCATION_1: NORMAL
MDC_IDC_SET_LEADCHNL_RV_SENSING_CATHODE_ELECTRODE_1: NORMAL
MDC_IDC_SET_LEADCHNL_RV_SENSING_CATHODE_LOCATION_1: NORMAL
MDC_IDC_SET_LEADCHNL_RV_SENSING_POLARITY: NORMAL
MDC_IDC_SET_LEADCHNL_RV_SENSING_SENSITIVITY: 0.45 MV
MDC_IDC_SET_ZONE_DETECTION_BEATS_DENOMINATOR: 24 {BEATS}
MDC_IDC_SET_ZONE_DETECTION_BEATS_NUMERATOR: 18 {BEATS}
MDC_IDC_SET_ZONE_DETECTION_INTERVAL: 300 MS
MDC_IDC_SET_ZONE_DETECTION_INTERVAL: 320 MS
MDC_IDC_SET_ZONE_DETECTION_INTERVAL: 350 MS
MDC_IDC_SET_ZONE_DETECTION_INTERVAL: 430 MS
MDC_IDC_SET_ZONE_DETECTION_INTERVAL: NORMAL
MDC_IDC_SET_ZONE_TYPE: NORMAL
MDC_IDC_STAT_AT_BURDEN_PERCENT: 0 %
MDC_IDC_STAT_AT_DTM_END: NORMAL
MDC_IDC_STAT_AT_DTM_START: NORMAL
MDC_IDC_STAT_BRADY_AP_VP_PERCENT: 28.65 %
MDC_IDC_STAT_BRADY_AP_VS_PERCENT: 43.69 %
MDC_IDC_STAT_BRADY_AS_VP_PERCENT: 5.73 %
MDC_IDC_STAT_BRADY_AS_VS_PERCENT: 21.93 %
MDC_IDC_STAT_BRADY_DTM_END: NORMAL
MDC_IDC_STAT_BRADY_DTM_START: NORMAL
MDC_IDC_STAT_BRADY_RA_PERCENT_PACED: 68.44 %
MDC_IDC_STAT_BRADY_RV_PERCENT_PACED: 34.4 %
MDC_IDC_STAT_EPISODE_RECENT_COUNT: 0
MDC_IDC_STAT_EPISODE_RECENT_COUNT: 1
MDC_IDC_STAT_EPISODE_RECENT_COUNT: 1
MDC_IDC_STAT_EPISODE_RECENT_COUNT_DTM_END: NORMAL
MDC_IDC_STAT_EPISODE_RECENT_COUNT_DTM_START: NORMAL
MDC_IDC_STAT_EPISODE_TOTAL_COUNT: 0
MDC_IDC_STAT_EPISODE_TOTAL_COUNT: 5
MDC_IDC_STAT_EPISODE_TOTAL_COUNT: 60
MDC_IDC_STAT_EPISODE_TOTAL_COUNT_DTM_END: NORMAL
MDC_IDC_STAT_EPISODE_TOTAL_COUNT_DTM_START: NORMAL
MDC_IDC_STAT_EPISODE_TYPE: NORMAL
MDC_IDC_STAT_TACHYTHERAPY_ATP_DELIVERED_RECENT: 0
MDC_IDC_STAT_TACHYTHERAPY_ATP_DELIVERED_TOTAL: 0
MDC_IDC_STAT_TACHYTHERAPY_RECENT_DTM_END: NORMAL
MDC_IDC_STAT_TACHYTHERAPY_RECENT_DTM_START: NORMAL
MDC_IDC_STAT_TACHYTHERAPY_SHOCKS_ABORTED_RECENT: 0
MDC_IDC_STAT_TACHYTHERAPY_SHOCKS_ABORTED_TOTAL: 0
MDC_IDC_STAT_TACHYTHERAPY_SHOCKS_DELIVERED_RECENT: 0
MDC_IDC_STAT_TACHYTHERAPY_SHOCKS_DELIVERED_TOTAL: 0
MDC_IDC_STAT_TACHYTHERAPY_TOTAL_DTM_END: NORMAL
MDC_IDC_STAT_TACHYTHERAPY_TOTAL_DTM_START: NORMAL

## 2021-02-15 ENCOUNTER — ANTICOAGULATION THERAPY VISIT (OUTPATIENT)
Dept: ANTICOAGULATION | Facility: CLINIC | Age: 86
End: 2021-02-15

## 2021-02-15 DIAGNOSIS — Z79.01 LONG TERM CURRENT USE OF ANTICOAGULANT THERAPY: ICD-10-CM

## 2021-02-15 DIAGNOSIS — C18.9 MALIGNANT NEOPLASM OF COLON, UNSPECIFIED PART OF COLON (H): ICD-10-CM

## 2021-02-15 DIAGNOSIS — I48.91 ATRIAL FIBRILLATION, UNSPECIFIED TYPE (H): ICD-10-CM

## 2021-02-15 DIAGNOSIS — I82.409 DEEP VEIN THROMBOSIS (DVT) (H): ICD-10-CM

## 2021-02-15 LAB
ALBUMIN SERPL-MCNC: 3.3 G/DL (ref 3.4–5)
ALP SERPL-CCNC: 98 U/L (ref 40–150)
ALT SERPL W P-5'-P-CCNC: 23 U/L (ref 0–70)
ANION GAP SERPL CALCULATED.3IONS-SCNC: 3 MMOL/L (ref 3–14)
AST SERPL W P-5'-P-CCNC: 18 U/L (ref 0–45)
BASOPHILS # BLD AUTO: 0 10E9/L (ref 0–0.2)
BASOPHILS NFR BLD AUTO: 0.3 %
BILIRUB SERPL-MCNC: 0.2 MG/DL (ref 0.2–1.3)
BUN SERPL-MCNC: 52 MG/DL (ref 7–30)
CALCIUM SERPL-MCNC: 8.9 MG/DL (ref 8.5–10.1)
CEA SERPL-MCNC: 5.5 UG/L (ref 0–2.5)
CHLORIDE SERPL-SCNC: 111 MMOL/L (ref 94–109)
CO2 SERPL-SCNC: 28 MMOL/L (ref 20–32)
CREAT SERPL-MCNC: 1.5 MG/DL (ref 0.66–1.25)
DIFFERENTIAL METHOD BLD: ABNORMAL
EOSINOPHIL # BLD AUTO: 0.1 10E9/L (ref 0–0.7)
EOSINOPHIL NFR BLD AUTO: 1.3 %
ERYTHROCYTE [DISTWIDTH] IN BLOOD BY AUTOMATED COUNT: 13.8 % (ref 10–15)
GFR SERPL CREATININE-BSD FRML MDRD: 42 ML/MIN/{1.73_M2}
GLUCOSE SERPL-MCNC: 98 MG/DL (ref 70–99)
HCT VFR BLD AUTO: 35.8 % (ref 40–53)
HGB BLD-MCNC: 11.4 G/DL (ref 13.3–17.7)
IMM GRANULOCYTES # BLD: 0 10E9/L (ref 0–0.4)
IMM GRANULOCYTES NFR BLD: 0.3 %
INR PPP: 2.03 (ref 0.86–1.14)
LYMPHOCYTES # BLD AUTO: 0.8 10E9/L (ref 0.8–5.3)
LYMPHOCYTES NFR BLD AUTO: 11.5 %
MCH RBC QN AUTO: 31 PG (ref 26.5–33)
MCHC RBC AUTO-ENTMCNC: 31.8 G/DL (ref 31.5–36.5)
MCV RBC AUTO: 97 FL (ref 78–100)
MONOCYTES # BLD AUTO: 0.8 10E9/L (ref 0–1.3)
MONOCYTES NFR BLD AUTO: 10.6 %
NEUTROPHILS # BLD AUTO: 5.5 10E9/L (ref 1.6–8.3)
NEUTROPHILS NFR BLD AUTO: 76 %
NRBC # BLD AUTO: 0 10*3/UL
NRBC BLD AUTO-RTO: 0 /100
PLATELET # BLD AUTO: 148 10E9/L (ref 150–450)
POTASSIUM SERPL-SCNC: 4.5 MMOL/L (ref 3.4–5.3)
PROT SERPL-MCNC: 7.3 G/DL (ref 6.8–8.8)
RBC # BLD AUTO: 3.68 10E12/L (ref 4.4–5.9)
SODIUM SERPL-SCNC: 143 MMOL/L (ref 133–144)
WBC # BLD AUTO: 7.2 10E9/L (ref 4–11)

## 2021-02-15 PROCEDURE — 85025 COMPLETE CBC W/AUTO DIFF WBC: CPT | Performed by: PATHOLOGY

## 2021-02-15 PROCEDURE — 99000 SPECIMEN HANDLING OFFICE-LAB: CPT | Performed by: PATHOLOGY

## 2021-02-15 PROCEDURE — 82378 CARCINOEMBRYONIC ANTIGEN: CPT | Mod: 90 | Performed by: PATHOLOGY

## 2021-02-15 PROCEDURE — 36415 COLL VENOUS BLD VENIPUNCTURE: CPT | Performed by: PATHOLOGY

## 2021-02-15 PROCEDURE — 85610 PROTHROMBIN TIME: CPT | Performed by: PATHOLOGY

## 2021-02-15 PROCEDURE — 80053 COMPREHEN METABOLIC PANEL: CPT | Performed by: PATHOLOGY

## 2021-02-15 NOTE — PROGRESS NOTES
ANTICOAGULATION FOLLOW-UP CLINIC VISIT    Patient Name:  Noe Florence  Date:  2/15/2021  Contact Type:  Telephone    SUBJECTIVE:         OBJECTIVE    Recent labs: (last 7 days)     02/15/21  1546   INR 2.03*       ASSESSMENT / PLAN  INR assessment THER    Recheck INR In: 4 WEEKS    INR Location Clinic      Anticoagulation Summary  As of 2/15/2021    INR goal:  1.5-2.5   TTR:  87.2 % (1 y)   INR used for dosin.03 (2/15/2021)   Warfarin maintenance plan:  5 mg (5 mg x 1) every day   Full warfarin instructions:  5 mg every day   Weekly warfarin total:  35 mg   Plan last modified:  Roya Manning RN (10/21/2020)   Next INR check:  3/15/2021   Priority:  Maintenance   Target end date:  Indefinite    Indications    Atrial fibrillation (H) [I48.91] [I48.91]  Long-term (current) use of anticoagulants [Z79.01] [Z79.01]  Deep vein thrombosis (DVT) (H) [I82.409]  Atrial fibrillation  unspecified type (H) [I48.91]             Anticoagulation Episode Summary     INR check location:      Preferred lab:      Send INR reminders to:  [facility]    Comments:  Patient contact number: [phone] Hx of Falls/Bleed  Can leave results with Rica (friend)  Restarted Warfarin due to DVT.       Anticoagulation Care Providers     Provider Role Specialty Phone number    Frida Desai MD Referring Cardiovascular Disease [phone]            See the Encounter Report to view Anticoagulation Flowsheet and Dosing Calendar (Go to Encounters tab in chart review, and find the Anticoagulation Therapy Visit)  Left message for patient with results and dosing recommendations. Asked patient to call back to report any missed doses, falls, signs and symptoms of bleeding or clotting, any changes in health, medication, or diet. Asked patient to call back with any questions or concerns.      Leena Abebe RN

## 2021-02-22 ENCOUNTER — THERAPY VISIT (OUTPATIENT)
Dept: PHYSICAL THERAPY | Facility: CLINIC | Age: 86
End: 2021-02-22
Payer: COMMERCIAL

## 2021-02-22 ENCOUNTER — VIRTUAL VISIT (OUTPATIENT)
Dept: ONCOLOGY | Facility: CLINIC | Age: 86
End: 2021-02-22
Attending: INTERNAL MEDICINE
Payer: COMMERCIAL

## 2021-02-22 DIAGNOSIS — M62.81 MUSCLE WEAKNESS (GENERALIZED): Primary | ICD-10-CM

## 2021-02-22 DIAGNOSIS — C18.9 MALIGNANT NEOPLASM OF COLON, UNSPECIFIED PART OF COLON (H): Primary | ICD-10-CM

## 2021-02-22 PROCEDURE — 97110 THERAPEUTIC EXERCISES: CPT | Mod: GP | Performed by: PHYSICAL THERAPIST

## 2021-02-22 PROCEDURE — 99213 OFFICE O/P EST LOW 20 MIN: CPT | Mod: 95 | Performed by: INTERNAL MEDICINE

## 2021-02-22 PROCEDURE — 97750 PHYSICAL PERFORMANCE TEST: CPT | Mod: GP | Performed by: PHYSICAL THERAPIST

## 2021-02-22 PROCEDURE — 999N001193 HC VIDEO/TELEPHONE VISIT; NO CHARGE

## 2021-02-22 PROCEDURE — 97116 GAIT TRAINING THERAPY: CPT | Mod: GP | Performed by: PHYSICAL THERAPIST

## 2021-02-22 NOTE — DISCHARGE INSTRUCTIONS
1. Set a timer to get up from your chair AT LEAST every hour  2. Focus on stretching with your belt and laying and stretching on the bed EVERYDAY. Do this before walking with the cane.    Next appointment: Tuesday March 9th at 3:15PM    Billie Montero PT, DPT  Physical Therapist  [facility] [address]  [address] [place] [address]  [email or web address]  Appointments: [phone]

## 2021-02-22 NOTE — PROGRESS NOTES
"Sha is a 85 year old who is being evaluated via a billable video visit.      How would you like to obtain your AVS? Mail a copy  If the video visit is dropped, the invitation should be resent by: Send to e-mail at: [email or web address]  Will anyone else be joining your video visit? No    Vitals - Patient Reported  Weight (Patient Reported): 70.3 kg (155 lb)  Height (Patient Reported): 170.2 cm (5' 7\")  BMI (Based on Pt Reported Ht/Wt): 24.28  Pain Score: No Pain (0)      Video-Visit Details    Type of service:  Video Visit    Originating Location (pt. Location): Home    Distant Location (provider location):  [facility]     Platform used for Video Visit: Mireya Salguero MA    Sha Madrid is here today in follow-up of colon cancer.  He has had 2 cancers, stage I many years ago and then in February 2019 a stage II cancer.  He has not had any adjuvant treatment.  He is here today for surveillance for recurrence.  He has continued to recover very slowly from his prior injuries, but is still fairly limited in activity.  He thinks his appetite is okay.  He does not have any problems with his bowel habits.  He does not have any significant pain.     On physical exam he appears his stated age and quite frail.   He has no icterus.  His speech is is very slow.      He has normal electrolytes and mildly abnormal renal function consistent with prior values.  His liver enzymes are unremarkable.  His blood counts are unremarkable.  His CEA level is further elevated at 5.5     Assessment/plan: Resected colon cancer currently without known evidence of disease.    The slowly rising CEA levels are of some concern.  Explained certain does not give us a clear indication of his cancer growing.  We reviewed our prior discussions about whether it was worth looking for evidence of recurrence as our ability to treat such would be very poor, but he would like to look so he has some ability to plan " if there is something growing there.  He also would like to get a colonoscopy which had been deferred because of the pandemic, and I suggested that perhaps of less value in more risk than doing the CT scan but was not unreasonable.  They will think about the latter and would like us to go ahead this week and get the CT scan done.  We will make further plans once I receive the results of the CT scan.

## 2021-02-22 NOTE — LETTER
"    2/22/2021         RE: Noe Florence  [address] [facility] [address]        Dear Colleague,    Thank you for referring your patient, Noe Florence, to the [facility]. Please see a copy of my visit note below.    Sha is a 85 year old who is being evaluated via a billable video visit.      How would you like to obtain your AVS? Mail a copy  If the video visit is dropped, the invitation should be resent by: Send to e-mail at: [email or web address]  Will anyone else be joining your video visit? No    Vitals - Patient Reported  Weight (Patient Reported): 70.3 kg (155 lb)  Height (Patient Reported): 170.2 cm (5' 7\")  BMI (Based on Pt Reported Ht/Wt): 24.28  Pain Score: No Pain (0)      Video-Visit Details    Type of service:  Video Visit    Originating Location (pt. Location): Home    Distant Location (provider location):  [facility]     Platform used for Video Visit: Mireya Salguero MA    Sha Madrid is here today in follow-up of colon cancer.  He has had 2 cancers, stage I many years ago and then in February 2019 a stage II cancer.  He has not had any adjuvant treatment.  He is here today for surveillance for recurrence.  He has continued to recover very slowly from his prior injuries, but is still fairly limited in activity.  He thinks his appetite is okay.  He does not have any problems with his bowel habits.  He does not have any significant pain.     On physical exam he appears his stated age and quite frail.   He has no icterus.  His speech is is very slow.      He has normal electrolytes and mildly abnormal renal function consistent with prior values.  His liver enzymes are unremarkable.  His blood counts are unremarkable.  His CEA level is further elevated at 5.5     Assessment/plan: Resected colon cancer currently without known evidence of disease.    The slowly rising CEA levels are of some concern.  Explained certain " does not give us a clear indication of his cancer growing.  We reviewed our prior discussions about whether it was worth looking for evidence of recurrence as our ability to treat such would be very poor, but he would like to look so he has some ability to plan if there is something growing there.  He also would like to get a colonoscopy which had been deferred because of the pandemic, and I suggested that perhaps of less value in more risk than doing the CT scan but was not unreasonable.  They will think about the latter and would like us to go ahead this week and get the CT scan done.  We will make further plans once I receive the results of the CT scan.      Again, thank you for allowing me to participate in the care of your patient.        Sincerely,        Francisco Gilliam MD

## 2021-02-24 ENCOUNTER — IMMUNIZATION (OUTPATIENT)
Dept: NURSING | Facility: CLINIC | Age: 86
End: 2021-02-24
Attending: FAMILY MEDICINE
Payer: COMMERCIAL

## 2021-02-24 PROCEDURE — 91300 PR COVID VAC PFIZER DIL RECON 30 MCG/0.3 ML IM: CPT

## 2021-02-24 PROCEDURE — 0002A PR COVID VAC PFIZER DIL RECON 30 MCG/0.3 ML IM: CPT

## 2021-03-05 ENCOUNTER — ANCILLARY PROCEDURE (OUTPATIENT)
Dept: CT IMAGING | Facility: CLINIC | Age: 86
End: 2021-03-05
Attending: INTERNAL MEDICINE
Payer: COMMERCIAL

## 2021-03-05 DIAGNOSIS — C18.9 MALIGNANT NEOPLASM OF COLON, UNSPECIFIED PART OF COLON (H): ICD-10-CM

## 2021-03-05 LAB
CREAT BLD-MCNC: 1.6 MG/DL (ref 0.66–1.25)
GFR SERPL CREATININE-BSD FRML MDRD: 41 ML/MIN/{1.73_M2}

## 2021-03-05 PROCEDURE — 74177 CT ABD & PELVIS W/CONTRAST: CPT | Performed by: RADIOLOGY

## 2021-03-05 PROCEDURE — 36415 COLL VENOUS BLD VENIPUNCTURE: CPT | Performed by: PATHOLOGY

## 2021-03-05 PROCEDURE — 82565 ASSAY OF CREATININE: CPT | Performed by: PATHOLOGY

## 2021-03-05 PROCEDURE — 71260 CT THORAX DX C+: CPT | Performed by: RADIOLOGY

## 2021-03-05 RX ORDER — IOPAMIDOL 755 MG/ML
96 INJECTION, SOLUTION INTRAVASCULAR ONCE
Status: COMPLETED | OUTPATIENT
Start: 2021-03-05 | End: 2021-03-05

## 2021-03-05 RX ADMIN — IOPAMIDOL 96 ML: 755 INJECTION, SOLUTION INTRAVASCULAR at 15:33

## 2021-03-08 ENCOUNTER — TELEPHONE (OUTPATIENT)
Dept: CARDIOLOGY | Facility: CLINIC | Age: 86
End: 2021-03-08

## 2021-03-08 DIAGNOSIS — I87.2 CHRONIC VENOUS INSUFFICIENCY: ICD-10-CM

## 2021-03-08 NOTE — TELEPHONE ENCOUNTER
[facility] Call Center    Phone Message    May a detailed message be left on voicemail: no     Reason for Call: Other: Rica calling for a Prescription for Compression Socks to be sent to the [facility] [phone]. Call Rica after this is done     Action Taken: Message routed to:  [facility] ([facility] Cardiology    Travel Screening: Not Applicable

## 2021-03-09 ENCOUNTER — THERAPY VISIT (OUTPATIENT)
Dept: PHYSICAL THERAPY | Facility: CLINIC | Age: 86
End: 2021-03-09
Payer: COMMERCIAL

## 2021-03-09 DIAGNOSIS — M62.81 MUSCLE WEAKNESS (GENERALIZED): Primary | ICD-10-CM

## 2021-03-09 DIAGNOSIS — R53.81 PHYSICAL DECONDITIONING: ICD-10-CM

## 2021-03-09 PROCEDURE — 97116 GAIT TRAINING THERAPY: CPT | Mod: GP | Performed by: PHYSICAL THERAPIST

## 2021-03-09 PROCEDURE — 97110 THERAPEUTIC EXERCISES: CPT | Mod: GP | Performed by: PHYSICAL THERAPIST

## 2021-03-09 NOTE — DISCHARGE INSTRUCTIONS
Call Jairo to schedule more visits: [phone]  3 visits every other week    Billie Montero PT, DPT  Physical Therapist  [facility] [place] [facility] [address]  [address] [place] [address]  [email or web address]  Appointments: [phone]

## 2021-03-15 ENCOUNTER — ANTICOAGULATION THERAPY VISIT (OUTPATIENT)
Dept: ANTICOAGULATION | Facility: CLINIC | Age: 86
End: 2021-03-15

## 2021-03-15 DIAGNOSIS — I48.91 ATRIAL FIBRILLATION, UNSPECIFIED TYPE (H): ICD-10-CM

## 2021-03-15 DIAGNOSIS — Z79.01 LONG TERM CURRENT USE OF ANTICOAGULANT THERAPY: ICD-10-CM

## 2021-03-15 DIAGNOSIS — I82.409 DEEP VEIN THROMBOSIS (DVT) (H): ICD-10-CM

## 2021-03-15 LAB — INR PPP: 2 (ref 0.86–1.14)

## 2021-03-15 PROCEDURE — 85610 PROTHROMBIN TIME: CPT | Performed by: PATHOLOGY

## 2021-03-15 PROCEDURE — 36416 COLLJ CAPILLARY BLOOD SPEC: CPT | Performed by: PATHOLOGY

## 2021-03-15 NOTE — PROGRESS NOTES
ANTICOAGULATION FOLLOW-UP CLINIC VISIT    Patient Name:  Noe Florence  Date:  3/15/2021  Contact Type:  Telephone    SUBJECTIVE:         OBJECTIVE    Recent labs: (last 7 days)     03/15/21  1616   INR 2.00*       ASSESSMENT / PLAN  INR assessment THER    Recheck INR In: 4 WEEKS    INR Location Clinic      Anticoagulation Summary  As of 3/15/2021    INR goal:  1.5-2.5   TTR:  92.2 % (1 y)   INR used for dosin.00 (3/15/2021)   Warfarin maintenance plan:  5 mg (5 mg x 1) every day   Full warfarin instructions:  5 mg every day   Weekly warfarin total:  35 mg   No change documented:  Leena Abebe RN   Plan last modified:  Roya Manning RN (10/21/2020)   Next INR check:  2021   Priority:  Maintenance   Target end date:  Indefinite    Indications    Atrial fibrillation (H) [I48.91] [I48.91]  Long-term (current) use of anticoagulants [Z79.01] [Z79.01]  Deep vein thrombosis (DVT) (H) [I82.409]  Atrial fibrillation  unspecified type (H) [I48.91]             Anticoagulation Episode Summary     INR check location:      Preferred lab:      Send INR reminders to:  [facility]    Comments:  Patient contact number: [phone] Hx of Falls/Bleed  Can leave results with Rica (friend)  Restarted Warfarin due to DVT.       Anticoagulation Care Providers     Provider Role Specialty Phone number    Frida Desai MD Referring Cardiovascular Disease [phone]            See the Encounter Report to view Anticoagulation Flowsheet and Dosing Calendar (Go to Encounters tab in chart review, and find the Anticoagulation Therapy Visit)    Spoke with Je Abebe, BY[place]

## 2021-03-23 ENCOUNTER — OFFICE VISIT (OUTPATIENT)
Dept: NEUROLOGY | Facility: CLINIC | Age: 86
End: 2021-03-23
Payer: COMMERCIAL

## 2021-03-23 VITALS
TEMPERATURE: 98.6 F | WEIGHT: 156.8 LBS | SYSTOLIC BLOOD PRESSURE: 142 MMHG | DIASTOLIC BLOOD PRESSURE: 76 MMHG | HEART RATE: 76 BPM | BODY MASS INDEX: 24.56 KG/M2

## 2021-03-23 DIAGNOSIS — D47.2 NEUROPATHY ASSOCIATED WITH MGUS (H): Primary | ICD-10-CM

## 2021-03-23 DIAGNOSIS — G63 NEUROPATHY ASSOCIATED WITH MGUS (H): Primary | ICD-10-CM

## 2021-03-23 NOTE — PROGRESS NOTES
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     Interval history:  I last saw him 2/11/2020. After we last met he was able to see PMR. Dr. Whipple. After that he was able to attend physical therapy, but unfortunately PT was limited due to the pandemic. He also has made some adjustments to his AFO, which are now working fairly well. He still is walking with a walker. No recent falls. No new areas of numbness or weakness.     Prior pertinent laboratory work-up:  3/19: Serum IF showed monoclonal IgM kappa. IgM 111.   3/18: MAG negative. Urine IF showed no monoclonal protein.   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 IgM 113.     Prior pertinent radiology work-up:  2015: CT LS spine showed degenerative changes resulting in mild to moderate spinal canal narrowing from L2-L5.    Prior electrophysiologic work-up:  6/14: Nerve conduction studies/EMG performed at [facility] showed a left common peroneal neuropathy vs L5 radiculopathy. 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   4/18: NCS/EMG showed multiple findings. See report for details.      Past Medical History:   Past Medical History:   Diagnosis Date     Advanced open-angle glaucoma      Atrial fibrillation (H)      CKD (chronic kidney disease), stage III 2005     Colon cancer (H)     Stage II-B colon cancer     Coronary artery disease     s/p CABG x 2, JEREMY x 2     Diverticulitis      Hyperlipidemia      Hypertension      Ischemic cardiomyopathy      MGUS (monoclonal gammopathy of unknown significance)      Nonsenile cataract     BE     Osteoporosis     left hip fracture     Polymorphic ventricular tachycardia (H)      Polymyalgia rheumatica (H)      PVD (posterior vitreous detachment), both eyes 2005     S/P  ablation of atrial flutter 6/20/14    CTI     Stented coronary artery      SVT (supraventricular tachycardia) (H)     PPM/AICD for NSVT     Upper leg DVT (deep venous thromboembolism), chronic (H)     Left     Weight loss, unintentional 2017    15 lb in 4 months     Past Surgical History:  Past Surgical History:   Procedure Laterality Date     C CABG, ARTERY-VEIN, FOUR  2006    LIMA-LAD, SVG-Rt PDA, SVG-OM2, SVG-Diag 1     C CABG, VEIN, SINGLE  1994    1-vessel CABG, SVG->PDRCA      CATARACT IOL, RT/LT Bilateral      COLONOSCOPY  3/13/2014    Procedure: COMBINED COLONOSCOPY, SINGLE BIOPSY/POLYPECTOMY BY BIOPSY;;  Surgeon: Mary Gerber MD;  Location:  GI     COLONOSCOPY N/A 6/22/2015    Procedure: COLONOSCOPY;  Surgeon: Marilin Newman MD;  Location:  GI     COLONOSCOPY N/A 11/7/2018    Procedure: COMBINED COLONOSCOPY, SINGLE OR MULTIPLE BIOPSY/POLYPECTOMY BY BIOPSY;  Surgeon: Roberto Esteban MD;  Location:  GI     EP ICD GENERATOR CHANGE DUAL N/A 12/11/2018    Procedure: EP ICD Generator Change Dual;  Surgeon: Deedee Baird MD;  Location:  HEART CARDIAC CATH LAB     H ABLATION ATRIAL FLUTTER       HEART CATH DRUG ELUTING STENT PLACEMENT  4/19/2012    JEREMY x 2 to LCx     IMPLANT IMPLANTABLE CARDIOVERTER DEFIBRILLATOR  5-    ppm/aicd     KNEE SURGERY      right and left knee surgeries     LAPAROSCOPIC ASSISTED COLECTOMY Right 2/1/2019    Procedure: Laparoscopic Converted to Open Transverse Colectomy with Lysis of Adhesions;  Surgeon: Chanelle Guzmán MD;  Location:  OR     LAPAROSCOPIC ASSISTED COLECTOMY LEFT (DESCENDING)  4/8/2014    Procedure: LAPAROSCOPIC ASSISTED COLECTOMY LEFT (DESCENDING);  Hand assisted Laparoscopic Sigmoid Colectomy , Laparoscopic mobilization of spleenic flexure *Latex Allergy*Anesthesia General with Block;  Surgeon: Chanelle Guzmán MD;  Location:  OR     OPEN REDUCTION INTERNAL FIXATION RODDING INTRAMEDULLAR FEMUR FRACTURE TABLE  Left 3/14/2019    Procedure: Open Reduction Internal Fixation Left Femur Intramedullar Nailing;  Surgeon: Srikanth Avalos MD;  Location: [facility] OR     REPAIR VALVE MITRAL  2006     30-mm Medtronic Julian ring      SELECTIVE LASER TRABECULOPLASTY (SLT) OD (RIGHT EYE)  4/10, 1/12,+1/9/13    RE     SELECTIVE LASER TRABECULOPLASTY (SLT) OS (LEFT EYE)  5/2012     SHOULDER SURGERY      right rotator cuff     Family history:    There is no known family history of hereditary neuropathies or other neuromuscular disorders.    Social History:    He denies tobacco, alcohol, or illicit drug use.     Medical Allergies:     Allergies   Allergen Reactions     Latex Rash     Rash     Current Medications:   Current Outpatient Medications   Medication     alendronate (FOSAMAX) 70 MG tablet     ARIPiprazole (ABILIFY) 2 MG tablet     atorvastatin (LIPITOR) 40 MG tablet     calcium carbonate 500 mg, elemental, (OSCAL;OYSTER SHELL CALCIUM) 500 MG tablet     cholecalciferol 1000 units TABS     COMPRESSION STOCKINGS     Cyanocobalamin (VITAMIN B-12 CR PO)     ferrous sulfate (FE TABS) 325 (65 Fe) MG EC tablet     ketoconazole (NIZORAL) 2 % external cream     metoprolol tartrate (LOPRESSOR) 25 MG tablet     mirtazapine (REMERON) 15 MG tablet     Multiple Vitamins-Minerals (MULTIVITAMIN ADULT PO)     sertraline (ZOLOFT) 100 MG tablet     tamsulosin (FLOMAX) 0.4 MG capsule     tiZANidine (ZANAFLEX) 2 MG tablet     warfarin ANTICOAGULANT (COUMADIN) 5 MG tablet     No current facility-administered medications for this visit.      Review of Systems: A review of systems was obtained and was negative except for what was noted above.    Physical examination:    BP (!) 142/76   Pulse 76   Temp 98.6  F (37  C)   Wt 71.1 kg (156 lb 12.8 oz)   BMI 24.56 kg/m      General Appearance: NAD    Musculoskeletal:  Pes cavus present.     Neurologic examination:    Mental status:  Patient is alert, attentive, and oriented x 3.  Language is coherent and  fluent without aphasia.  Memory, comprehension and ability to follow commands were intact.       Cranial nerves:  Extraocular movements were full. There was no face, jaw, palate or tongue weakness or atrophy.      Motor exam: Atrophy in left TA. Strength testing is stable from 1 year ago. He continues to have 3/5 power inleft foot DF. FDI and APB are mildly weak as well. Right DF and bilateral PF are still strong.     Complex motor skills: Mild postural hand tremor.      Sensory exam: Pin decreased in left peroneal distribution. Vibration mildly reduced for age in toes > ankles.     Gait: Antalgic. Stooped posture. Slow, short and cautious steps with walker    Deep tendon reflexes:   Right Left   Triceps 2 2   Biceps 2 2   Brachioradialis 2 2   Knee jerk 2 2   Ankle jerk 0 0      Assessment:    Multifactorial gait disorder with contributions from neuropathy and peroneal neuropathy. Both stable today. Management remains supportive. All questions answered.     Plan:      1. Neuropathy associated with IgM paraprotein: Repeat serum IF today (Last checked 3/19). Neuropathy is stable.   2. Balance and falls: Continue Physical therapy  3. Peroneal neuropathy: Continue use of left AFO. Appreciate input from orthotics on this. Peroneal neuropathy is stable.   4. Follow up in about 12 months. Sooner if needed.   ---  3/24/: Serum IF showed a very small monoclonal IgM immunoglobulin of kappa light chain type. IgM quant 150. Will continue to follow.

## 2021-03-23 NOTE — LETTER
3/23/2021       RE: Noe Florence  [address] [facility] [address]     Dear Colleague,    Thank you for referring your patient, Noe Florence, to the [facility]. Please see a copy of my visit note below.    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     Interval history:  I last saw him 2/11/2020. After we last met he was able to see PMR. Dr. Whipple. After that he was able to attend physical therapy, but unfortunately PT was limited due to the pandemic. He also has made some adjustments to his AFO, which are now working fairly well. He still is walking with a walker. No recent falls. No new areas of numbness or weakness.     Prior pertinent laboratory work-up:  3/19: Serum IF showed monoclonal IgM kappa. IgM 111.   3/18: MAG negative. Urine IF showed no monoclonal protein.   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 IgM 113.     Prior pertinent radiology work-up:  2015: CT LS spine showed degenerative changes resulting in mild to moderate spinal canal narrowing from L2-L5.    Prior electrophysiologic work-up:  6/14: Nerve conduction studies/EMG performed at [facility] showed a left common peroneal neuropathy vs L5 radiculopathy. 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   4/18: NCS/EMG showed multiple findings. See report for details.      Past Medical History:   Past Medical History:   Diagnosis Date     Advanced open-angle glaucoma      Atrial fibrillation (H)      CKD (chronic kidney disease), stage III 2005     Colon cancer (H)     Stage II-B colon cancer     Coronary artery disease     s/p CABG x 2, JEREMY x 2     Diverticulitis      Hyperlipidemia      Hypertension       Ischemic cardiomyopathy      MGUS (monoclonal gammopathy of unknown significance)      Nonsenile cataract     BE     Osteoporosis     left hip fracture     Polymorphic ventricular tachycardia (H)      Polymyalgia rheumatica (H)      PVD (posterior vitreous detachment), both eyes 2005     S/P ablation of atrial flutter 6/20/14    CTI     Stented coronary artery      SVT (supraventricular tachycardia) (H)     PPM/AICD for NSVT     Upper leg DVT (deep venous thromboembolism), chronic (H)     Left     Weight loss, unintentional 2017    15 lb in 4 months     Past Surgical History:  Past Surgical History:   Procedure Laterality Date     C CABG, ARTERY-VEIN, FOUR  2006    LIMA-LAD, SVG-Rt PDA, SVG-OM2, SVG-Diag 1     C CABG, VEIN, SINGLE  1994    1-vessel CABG, SVG->PDRCA      CATARACT IOL, RT/LT Bilateral      COLONOSCOPY  3/13/2014    Procedure: COMBINED COLONOSCOPY, SINGLE BIOPSY/POLYPECTOMY BY BIOPSY;;  Surgeon: Mary Gerber MD;  Location:  GI     COLONOSCOPY N/A 6/22/2015    Procedure: COLONOSCOPY;  Surgeon: Marilin Newman MD;  Location:  GI     COLONOSCOPY N/A 11/7/2018    Procedure: COMBINED COLONOSCOPY, SINGLE OR MULTIPLE BIOPSY/POLYPECTOMY BY BIOPSY;  Surgeon: Roberto Esteban MD;  Location:  GI     EP ICD GENERATOR CHANGE DUAL N/A 12/11/2018    Procedure: EP ICD Generator Change Dual;  Surgeon: Deedee Baird MD;  Location:  HEART CARDIAC CATH LAB     H ABLATION ATRIAL FLUTTER       HEART CATH DRUG ELUTING STENT PLACEMENT  4/19/2012    JEREMY x 2 to LCx     IMPLANT IMPLANTABLE CARDIOVERTER DEFIBRILLATOR  5-    ppm/aicd     KNEE SURGERY      right and left knee surgeries     LAPAROSCOPIC ASSISTED COLECTOMY Right 2/1/2019    Procedure: Laparoscopic Converted to Open Transverse Colectomy with Lysis of Adhesions;  Surgeon: Chanelle Guzmán MD;  Location:  OR     LAPAROSCOPIC ASSISTED COLECTOMY LEFT (DESCENDING)  4/8/2014    Procedure: LAPAROSCOPIC ASSISTED COLECTOMY  LEFT (DESCENDING);  Hand assisted Laparoscopic Sigmoid Colectomy , Laparoscopic mobilization of spleenic flexure *Latex Allergy*Anesthesia General with Block;  Surgeon: Chanelle Guzmán MD;  Location: [facility] OR     OPEN REDUCTION INTERNAL FIXATION RODDING INTRAMEDULLAR FEMUR FRACTURE TABLE Left 3/14/2019    Procedure: Open Reduction Internal Fixation Left Femur Intramedullar Nailing;  Surgeon: Srikanth Avalos MD;  Location: [facility] OR     REPAIR VALVE MITRAL  2006     30-mm Medtronic Julian ring      SELECTIVE LASER TRABECULOPLASTY (SLT) OD (RIGHT EYE)  4/10, 1/12,+1/9/13    RE     SELECTIVE LASER TRABECULOPLASTY (SLT) OS (LEFT EYE)  5/2012     SHOULDER SURGERY      right rotator cuff     Family history:    There is no known family history of hereditary neuropathies or other neuromuscular disorders.    Social History:    He denies tobacco, alcohol, or illicit drug use.     Medical Allergies:     Allergies   Allergen Reactions     Latex Rash     Rash     Current Medications:   Current Outpatient Medications   Medication     alendronate (FOSAMAX) 70 MG tablet     ARIPiprazole (ABILIFY) 2 MG tablet     atorvastatin (LIPITOR) 40 MG tablet     calcium carbonate 500 mg, elemental, (OSCAL;OYSTER SHELL CALCIUM) 500 MG tablet     cholecalciferol 1000 units TABS     COMPRESSION STOCKINGS     Cyanocobalamin (VITAMIN B-12 CR PO)     ferrous sulfate (FE TABS) 325 (65 Fe) MG EC tablet     ketoconazole (NIZORAL) 2 % external cream     metoprolol tartrate (LOPRESSOR) 25 MG tablet     mirtazapine (REMERON) 15 MG tablet     Multiple Vitamins-Minerals (MULTIVITAMIN ADULT PO)     sertraline (ZOLOFT) 100 MG tablet     tamsulosin (FLOMAX) 0.4 MG capsule     tiZANidine (ZANAFLEX) 2 MG tablet     warfarin ANTICOAGULANT (COUMADIN) 5 MG tablet     No current facility-administered medications for this visit.      Review of Systems: A review of systems was obtained and was negative except for what was noted above.    Physical  examination:    BP (!) 142/76   Pulse 76   Temp 98.6  F (37  C)   Wt 71.1 kg (156 lb 12.8 oz)   BMI 24.56 kg/m      General Appearance: NAD    Musculoskeletal:  Pes cavus present.     Neurologic examination:    Mental status:  Patient is alert, attentive, and oriented x 3.  Language is coherent and fluent without aphasia.  Memory, comprehension and ability to follow commands were intact.       Cranial nerves:  Extraocular movements were full. There was no face, jaw, palate or tongue weakness or atrophy.      Motor exam: Atrophy in left TA. Strength testing is stable from 1 year ago. He continues to have 3/5 power inleft foot DF. FDI and APB are mildly weak as well. Right DF and bilateral PF are still strong.     Complex motor skills: Mild postural hand tremor.      Sensory exam: Pin decreased in left peroneal distribution. Vibration mildly reduced for age in toes > ankles.     Gait: Antalgic. Stooped posture. Slow, short and cautious steps with walker    Deep tendon reflexes:   Right Left   Triceps 2 2   Biceps 2 2   Brachioradialis 2 2   Knee jerk 2 2   Ankle jerk 0 0      Assessment:    Multifactorial gait disorder with contributions from neuropathy and peroneal neuropathy. Both stable today. Management remains supportive. All questions answered.     Plan:      1. Neuropathy associated with IgM paraprotein: Repeat serum IF today (Last checked 3/19). Neuropathy is stable.   2. Balance and falls: Continue Physical therapy  3. Peroneal neuropathy: Continue use of left AFO. Appreciate input from orthotics on this. Peroneal neuropathy is stable.   4. Follow up in about 12 months. Sooner if needed.   ---  3/24/: Serum IF showed a very small monoclonal IgM immunoglobulin of kappa light chain type. IgM quant 150. Will continue to follow.     Again, thank you for allowing me to participate in the care of your patient.      Sincerely,    Jase Duarte MD

## 2021-03-24 LAB
IGA SERPL-MCNC: 517 MG/DL (ref 84–499)
IGG SERPL-MCNC: 1090 MG/DL (ref 610–1616)
IGM SERPL-MCNC: 130 MG/DL (ref 35–242)
PROT PATTERN SERPL IFE-IMP: ABNORMAL

## 2021-03-25 ENCOUNTER — THERAPY VISIT (OUTPATIENT)
Dept: PHYSICAL THERAPY | Facility: CLINIC | Age: 86
End: 2021-03-25
Payer: COMMERCIAL

## 2021-03-25 ENCOUNTER — TELEPHONE (OUTPATIENT)
Dept: NEUROLOGY | Facility: CLINIC | Age: 86
End: 2021-03-25

## 2021-03-25 DIAGNOSIS — M62.81 MUSCLE WEAKNESS (GENERALIZED): Primary | ICD-10-CM

## 2021-03-25 DIAGNOSIS — R53.81 PHYSICAL DECONDITIONING: ICD-10-CM

## 2021-03-25 PROCEDURE — 97110 THERAPEUTIC EXERCISES: CPT | Mod: GP | Performed by: PHYSICAL THERAPIST

## 2021-03-25 PROCEDURE — 97116 GAIT TRAINING THERAPY: CPT | Mod: GP | Performed by: PHYSICAL THERAPIST

## 2021-03-25 NOTE — TELEPHONE ENCOUNTER
Spoke with Rica, patient's wife, and stated that per Dr. Duarte, recent lab work still showed the monoclonal protein, but it is still very small.  Dr. Duarte will continue to keep an eye on it. Rica agreed with the plan.    Renay Machado RN

## 2021-04-12 ENCOUNTER — ANTICOAGULATION THERAPY VISIT (OUTPATIENT)
Dept: ANTICOAGULATION | Facility: CLINIC | Age: 86
End: 2021-04-12

## 2021-04-12 DIAGNOSIS — Z79.01 LONG TERM CURRENT USE OF ANTICOAGULANT THERAPY: ICD-10-CM

## 2021-04-12 DIAGNOSIS — I82.409 DEEP VEIN THROMBOSIS (DVT) (H): ICD-10-CM

## 2021-04-12 DIAGNOSIS — I48.91 ATRIAL FIBRILLATION, UNSPECIFIED TYPE (H): ICD-10-CM

## 2021-04-12 LAB — INR PPP: 1.64 (ref 0.86–1.14)

## 2021-04-12 PROCEDURE — 85610 PROTHROMBIN TIME: CPT | Performed by: PATHOLOGY

## 2021-04-12 PROCEDURE — 36416 COLLJ CAPILLARY BLOOD SPEC: CPT | Performed by: PATHOLOGY

## 2021-04-12 NOTE — PROGRESS NOTES
ANTICOAGULATION FOLLOW-UP CLINIC VISIT    Patient Name:  Noe Florence  Date:  2021  Contact Type:  Telephone    SUBJECTIVE:  Patient Findings     Positives:  Change in diet/appetite (Ate salad with spinach)             OBJECTIVE    Recent labs: (last 7 days)     21  1554   INR 1.64*       ASSESSMENT / PLAN  INR assessment THER    Recheck INR In: 4 WEEKS    INR Location Clinic      Anticoagulation Summary  As of 2021    INR goal:  1.5-2.5   TTR:  92.2 % (1 y)   INR used for dosin.64 (2021)   Warfarin maintenance plan:  5 mg (5 mg x 1) every day   Full warfarin instructions:  5 mg every day   Weekly warfarin total:  35 mg   Plan last modified:  Roya Manning RN (10/21/2020)   Next INR check:  5/10/2021   Priority:  Maintenance   Target end date:  Indefinite    Indications    Atrial fibrillation (H) [I48.91] [I48.91]  Long-term (current) use of anticoagulants [Z79.01] [Z79.01]  Deep vein thrombosis (DVT) (H) [I82.409]  Atrial fibrillation  unspecified type (H) [I48.91]             Anticoagulation Episode Summary     INR check location:      Preferred lab:      Send INR reminders to:  [facility]    Comments:  Patient contact number: [phone] Hx of Falls/Bleed  Can leave results with Rica (friend)  Restarted Warfarin due to DVT.       Anticoagulation Care Providers     Provider Role Specialty Phone number    Frida Desai MD Referring Cardiovascular Disease [phone]            See the Encounter Report to view Anticoagulation Flowsheet and Dosing Calendar (Go to Encounters tab in chart review, and find the Anticoagulation Therapy Visit)  Spoke with Rica.    Leena Abebe RN

## 2021-04-12 NOTE — TELEPHONE ENCOUNTER
DIAGNOSIS: (R) Elbow strain , per pt's partner , no images    APPOINTMENT DATE: 4.16.21   NOTES STATUS DETAILS   OFFICE NOTE from referring provider N/A    OFFICE NOTE from other specialist N/A    DISCHARGE SUMMARY from hospital Care Everywhere 5.22.21-5.25.21 [facility] - R humerus fracture   DISCHARGE REPORT from the ER N/A    OPERATIVE REPORT N/A    EMG report N/A    MEDICATION LIST Internal    MRI N/A    DEXA (osteoporosis/bone health) N/A    CT SCAN N/A    XRAYS (IMAGES & REPORTS) PACs 8.14.19 R elbow, [facility]  7.3.19 R elbow, [facility]  6.12.19 R elbow, [facility]  5.29.19 R elbow, [facility]  5.24.19 R elbow, [facility]  5.23.19 R elbow, [facility]     Action 4.12.21 9:17 AM DAISY   Action Taken Requested images from [facility]

## 2021-04-16 ENCOUNTER — ANCILLARY PROCEDURE (OUTPATIENT)
Dept: GENERAL RADIOLOGY | Facility: CLINIC | Age: 86
End: 2021-04-16
Attending: FAMILY MEDICINE
Payer: COMMERCIAL

## 2021-04-16 ENCOUNTER — PRE VISIT (OUTPATIENT)
Dept: ORTHOPEDICS | Facility: CLINIC | Age: 86
End: 2021-04-16

## 2021-04-16 ENCOUNTER — OFFICE VISIT (OUTPATIENT)
Dept: ORTHOPEDICS | Facility: CLINIC | Age: 86
End: 2021-04-16
Payer: COMMERCIAL

## 2021-04-16 DIAGNOSIS — M25.521 RIGHT ELBOW PAIN: ICD-10-CM

## 2021-04-16 DIAGNOSIS — M25.521 RIGHT ELBOW PAIN: Primary | ICD-10-CM

## 2021-04-16 PROCEDURE — 99213 OFFICE O/P EST LOW 20 MIN: CPT | Performed by: FAMILY MEDICINE

## 2021-04-16 PROCEDURE — 73080 X-RAY EXAM OF ELBOW: CPT | Mod: RT | Performed by: RADIOLOGY

## 2021-04-16 NOTE — LETTER
4/16/2021         RE: Noe Florence  [address] [facility] [address]        Dear Colleague,    Thank you for referring your patient, Noe Florence, to the [facility]. Please see a copy of my visit note below.      [facility]  SPORTS & ORTHOPEDIC CLINIC VISIT     Apr 16, 2021        ASSESSMENT & PLAN    85-year-old with right elbow pain that is likely musculotendinous and due to overuse secondary to her recent exercise routine    Reviewed imaging and assessment with patient in detail  Recommended relative rest.  Topical NSAIDs are appropriate as well as icing.  Recommended follow-up if not improving.  Sooner if worsening.    Benjamin Fairchild MD  [facility]    -----  Chief Complaint   Patient presents with     Right Elbow - Pain       SUBJECTIVE  Noe Florence is a/an 85 year old male who is seen as a self referral for evaluation of  R elbow pain.     The patient is seen with their son.  The patient is Right handed    Son has some notes from Bill's partner who suggested that he may have exacerbated his R elbow Sx from  rehab.    Onset: 2 week(s) ago. Patient describes injury as potential overuse from [facility] rehab  Location of Pain: right elbow (extensor muscles belly, proximal 1/3)  Worsened by: overuse,   Better with: rest  Treatments tried: rest/activity avoidance and brace from [facility] for a day  Associated symptoms: no distal numbness or tingling; denies swelling or warmth    Orthopedic/Surgical history: sustained R elbow fracture about 2 years ago (yong  Social History/Occupation: retired      REVIEW OF SYSTEMS:    Do you have fever, chills, weight loss? No    Do you have any vision problems? No    Do you have any chest pain or edema? No    Do you have any shortness of breath or wheezing?  No    Do you have stomach problems? No    Do you have any numbness or focal weakness? No    Do  you have diabetes? No    Do you have problems with bleeding or clotting? No    Do you have an rashes or other skin lesions? No    OBJECTIVE:  There were no vitals taken for this visit.     General: Alert, pleasant, no distress  Right elbow: warm, well perfused, SILT throughout     Inspection: No swelling ecchymosis or deformity     ROM: Symmetric.  Some pain with terminal pronation supination     Strength: Intact.  Pain with elbow extension pronation supination against resistance     Palpation: TTP diffusely over the dorsal and medial forearm just distal to the elbow.  No TTP of the medial or lateral epicondyle, antecubital fossa or olecranon     Special Tests: None      RADIOLOGY:    3 view xrays of right elbow performed and reviewed independently demonstrating moderate DJD of the elbow.  No acute fracture. See EMR for formal radiology report.         Benjamin Fairchild MD

## 2021-04-16 NOTE — PROGRESS NOTES
[facility] CLINICS AND SURGERY CENTER  SPORTS & ORTHOPEDIC CLINIC VISIT     Apr 16, 2021        ASSESSMENT & PLAN    85-year-old with right elbow pain that is likely musculotendinous and due to overuse secondary to her recent exercise routine    Reviewed imaging and assessment with patient in detail  Recommended relative rest.  Topical NSAIDs are appropriate as well as icing.  Recommended follow-up if not improving.  Sooner if worsening.    Benjamin Fairchild MD  [facility] ORTHOPEDIC [facility]    -----  Chief Complaint   Patient presents with     Right Elbow - Pain       SUBJECTIVE  Noe Florence is a/an 85 year old male who is seen as a self referral for evaluation of  R elbow pain.     The patient is seen with their son.  The patient is Right handed    Son has some notes from Bill's partner who suggested that he may have exacerbated his R elbow Sx from  rehab.    Onset: 2 week(s) ago. Patient describes injury as potential overuse from [facility]  Location of Pain: right elbow (extensor muscles belly, proximal 1/3)  Worsened by: overuse,   Better with: rest  Treatments tried: rest/activity avoidance and brace from [facility] for a day  Associated symptoms: no distal numbness or tingling; denies swelling or warmth    Orthopedic/Surgical history: sustained R elbow fracture about 2 years ago (yong  Social History/Occupation: retired      REVIEW OF SYSTEMS:    Do you have fever, chills, weight loss? No    Do you have any vision problems? No    Do you have any chest pain or edema? No    Do you have any shortness of breath or wheezing?  No    Do you have stomach problems? No    Do you have any numbness or focal weakness? No    Do you have diabetes? No    Do you have problems with bleeding or clotting? No    Do you have an rashes or other skin lesions? No    OBJECTIVE:  There were no vitals taken for this visit.     General: Alert, pleasant, no distress  Right elbow: warm, well perfused, SILT  throughout     Inspection: No swelling ecchymosis or deformity     ROM: Symmetric.  Some pain with terminal pronation supination     Strength: Intact.  Pain with elbow extension pronation supination against resistance     Palpation: TTP diffusely over the dorsal and medial forearm just distal to the elbow.  No TTP of the medial or lateral epicondyle, antecubital fossa or olecranon     Special Tests: None      RADIOLOGY:    3 view xrays of right elbow performed and reviewed independently demonstrating moderate DJD of the elbow.  No acute fracture. See EMR for formal radiology report.

## 2021-04-19 ENCOUNTER — THERAPY VISIT (OUTPATIENT)
Dept: PHYSICAL THERAPY | Facility: CLINIC | Age: 86
End: 2021-04-19
Payer: COMMERCIAL

## 2021-04-19 DIAGNOSIS — M62.81 MUSCLE WEAKNESS (GENERALIZED): Primary | ICD-10-CM

## 2021-04-19 DIAGNOSIS — R53.81 PHYSICAL DECONDITIONING: ICD-10-CM

## 2021-04-19 PROCEDURE — 97116 GAIT TRAINING THERAPY: CPT | Mod: GP | Performed by: PHYSICAL THERAPIST

## 2021-04-19 PROCEDURE — 97110 THERAPEUTIC EXERCISES: CPT | Mod: GP | Performed by: PHYSICAL THERAPIST

## 2021-05-03 ENCOUNTER — OFFICE VISIT (OUTPATIENT)
Dept: INTERNAL MEDICINE | Facility: CLINIC | Age: 86
End: 2021-05-03
Payer: COMMERCIAL

## 2021-05-03 VITALS
HEART RATE: 71 BPM | SYSTOLIC BLOOD PRESSURE: 145 MMHG | OXYGEN SATURATION: 100 % | BODY MASS INDEX: 23.96 KG/M2 | DIASTOLIC BLOOD PRESSURE: 69 MMHG | WEIGHT: 153 LBS

## 2021-05-03 DIAGNOSIS — E78.5 HYPERLIPIDEMIA, UNSPECIFIED HYPERLIPIDEMIA TYPE: ICD-10-CM

## 2021-05-03 DIAGNOSIS — M81.0 AGE RELATED OSTEOPOROSIS, UNSPECIFIED PATHOLOGICAL FRACTURE PRESENCE: ICD-10-CM

## 2021-05-03 DIAGNOSIS — I48.20 CHRONIC ATRIAL FIBRILLATION (H): ICD-10-CM

## 2021-05-03 DIAGNOSIS — I10 HYPERTENSION, UNSPECIFIED TYPE: Primary | ICD-10-CM

## 2021-05-03 DIAGNOSIS — I21.4 NSTEMI (NON-ST ELEVATED MYOCARDIAL INFARCTION) (H): ICD-10-CM

## 2021-05-03 DIAGNOSIS — I48.91 ATRIAL FIBRILLATION, UNSPECIFIED TYPE (H): ICD-10-CM

## 2021-05-03 DIAGNOSIS — I25.10 CORONARY ARTERY DISEASE INVOLVING NATIVE HEART WITHOUT ANGINA PECTORIS, UNSPECIFIED VESSEL OR LESION TYPE: ICD-10-CM

## 2021-05-03 DIAGNOSIS — R35.0 URINARY FREQUENCY: ICD-10-CM

## 2021-05-03 DIAGNOSIS — B35.3 TINEA PEDIS OF BOTH FEET: ICD-10-CM

## 2021-05-03 PROCEDURE — 99214 OFFICE O/P EST MOD 30 MIN: CPT | Performed by: INTERNAL MEDICINE

## 2021-05-03 RX ORDER — METOPROLOL TARTRATE 25 MG/1
25 TABLET, FILM COATED ORAL 2 TIMES DAILY
Qty: 180 TABLET | Refills: 3 | Status: SHIPPED | OUTPATIENT
Start: 2021-05-03 | End: 2022-05-16

## 2021-05-03 RX ORDER — WARFARIN SODIUM 5 MG/1
TABLET ORAL
Qty: 90 TABLET | Refills: 4 | Status: SHIPPED | OUTPATIENT
Start: 2021-05-03 | End: 2022-08-01

## 2021-05-03 RX ORDER — ATORVASTATIN CALCIUM 20 MG/1
20 TABLET, FILM COATED ORAL DAILY
Qty: 100 TABLET | Refills: 3 | Status: SHIPPED | OUTPATIENT
Start: 2021-05-03 | End: 2022-04-15

## 2021-05-03 RX ORDER — KETOCONAZOLE 20 MG/G
CREAM TOPICAL
Qty: 60 G | Refills: 3 | Status: SHIPPED | OUTPATIENT
Start: 2021-05-03 | End: 2021-06-17

## 2021-05-03 RX ORDER — TAMSULOSIN HYDROCHLORIDE 0.4 MG/1
0.8 CAPSULE ORAL DAILY
Qty: 180 CAPSULE | Refills: 3 | Status: SHIPPED | OUTPATIENT
Start: 2021-05-03 | End: 2022-05-27

## 2021-05-03 RX ORDER — ALENDRONATE SODIUM 70 MG/1
70 TABLET ORAL
Qty: 12 TABLET | Refills: 3 | Status: SHIPPED | OUTPATIENT
Start: 2021-05-03 | End: 2022-04-15

## 2021-05-03 NOTE — NURSING NOTE
Chief Complaint   Patient presents with     RECHECK     follow up       Marla Saenz MA, at 3:00 PM on 5/3/2021.

## 2021-05-03 NOTE — PATIENT INSTRUCTIONS
[facility] Medication Refill Request Information:  * Please contact your pharmacy regarding ANY request for medication refills.  ** [facility] Prescription Fax = [phone]  * Please allow 3 business days for routine medication refills.  * Please allow 5 business days for controlled substance medication refills.     [facility] Test Result notification information:  *You will be notified with in 7-10 days of your appointment day regarding the results of your test.  If you are on MyChart you will be notified as soon as the provider has reviewed the results and signed off on them.    [facility]: [phone]

## 2021-05-03 NOTE — PROGRESS NOTES
HPI  85-year-old returns today with his wife.  He has done somewhat better in regards to the bowel movements.  Is having less voluminous stools now and he is tolerating this better.  Urinary continence is also improved on the tamsulosin.  He has not been taking his alendronate.  We discussed this and he agreed to resume.  He is still bothered by his foot drop.  Is participating in physical therapy with this has been somewhat sporadic about doing the exercises.  He is wearing his AFO for ambulation and is planning to increase his ambulation and walking now that the weather is better.  Past Medical History:   Diagnosis Date     Advanced open-angle glaucoma      Atrial fibrillation (H)      CKD (chronic kidney disease), stage III 2005     Colon cancer (H)     Stage II-B colon cancer     Coronary artery disease     s/p CABG x 2, JEREMY x 2     Diverticulitis      Hyperlipidemia      Hypertension      Ischemic cardiomyopathy      MGUS (monoclonal gammopathy of unknown significance)      Nonsenile cataract     BE     Osteoporosis     left hip fracture     Polymorphic ventricular tachycardia (H)      Polymyalgia rheumatica (H)      PVD (posterior vitreous detachment), both eyes 2005     S/P ablation of atrial flutter 6/20/14    CTI     Stented coronary artery      SVT (supraventricular tachycardia) (H)     PPM/AICD for NSVT     Upper leg DVT (deep venous thromboembolism), chronic (H)     Left     Weight loss, unintentional 2017    15 lb in 4 months     Past Surgical History:   Procedure Laterality Date     C CABG, ARTERY-VEIN, FOUR  2006    LIMA-LAD, SVG-Rt PDA, SVG-OM2, SVG-Diag 1     C CABG, VEIN, SINGLE  1994    1-vessel CABG, SVG->PDRCA      CATARACT IOL, RT/LT Bilateral      COLONOSCOPY  3/13/2014    Procedure: COMBINED COLONOSCOPY, SINGLE BIOPSY/POLYPECTOMY BY BIOPSY;;  Surgeon: Mary Gerber MD;  Location:  GI     COLONOSCOPY N/A 6/22/2015    Procedure: COLONOSCOPY;  Surgeon: Marilin Newman MD;   Location: [facility] GI     COLONOSCOPY N/A 11/7/2018    Procedure: COMBINED COLONOSCOPY, SINGLE OR MULTIPLE BIOPSY/POLYPECTOMY BY BIOPSY;  Surgeon: Roberto Esteban MD;  [facility]: [facility]     EP ICD GENERATOR CHANGE DUAL N/A 12/11/2018    Procedure: EP ICD Generator Change Dual;  Surgeon: Deedee Baird MD;  Location:  HEART CARDIAC CATH LAB     H ABLATION ATRIAL FLUTTER       HEART CATH DRUG ELUTING STENT PLACEMENT  4/19/2012    JEREMY x 2 to LCx     IMPLANT IMPLANTABLE CARDIOVERTER DEFIBRILLATOR  5-    ppm/aicd     KNEE SURGERY      right and left knee surgeries     LAPAROSCOPIC ASSISTED COLECTOMY Right 2/1/2019    Procedure: Laparoscopic Converted to Open Transverse Colectomy with Lysis of Adhesions;  Surgeon: Chanelle Guzmán MD;  Location:  OR     LAPAROSCOPIC ASSISTED COLECTOMY LEFT (DESCENDING)  4/8/2014    Procedure: LAPAROSCOPIC ASSISTED COLECTOMY LEFT (DESCENDING);  Hand assisted Laparoscopic Sigmoid Colectomy , Laparoscopic mobilization of spleenic flexure *Latex Allergy*Anesthesia General with Block;  Surgeon: Chanelle Guzmán MD;  [facility]: [facility] OR     OPEN REDUCTION INTERNAL FIXATION RODDING INTRAMEDULLAR FEMUR FRACTURE TABLE Left 3/14/2019    Procedure: Open Reduction Internal Fixation Left Femur Intramedullar Nailing;  Surgeon: Srikanth Avalos MD;  Location:  OR     REPAIR VALVE MITRAL  2006     30-mm Medtronic Julian ring      SELECTIVE LASER TRABECULOPLASTY (SLT) OD (RIGHT EYE)  4/10, 1/12,+1/9/13    RE     SELECTIVE LASER TRABECULOPLASTY (SLT) OS (LEFT EYE)  5/2012     SHOULDER SURGERY      right rotator cuff     Family History   Problem Relation Age of Onset     C.A.D. Father      Anesthesia Reaction No family hx of      Crohn's Disease No family hx of      Ulcerative Colitis No family hx of      Cancer - colorectal No family hx of      Macular Degeneration No family hx of      Cancer No family hx of         No known family hx of skin cancer     Melanoma No  family hx of      Skin Cancer No family hx of      Social History     Socioeconomic History     Marital status:      Spouse name: Not on file     Number of children: Not on file     Years of education: Not on file     Highest education level: Not on file   Occupational History     Not on file   Social Needs     Financial resource strain: Not on file     Food insecurity     Worry: Not on file     Inability: Not on file     Transportation needs     Medical: Not on file     Non-medical: Not on file   Tobacco Use     Smoking status: Former Smoker     Types: Cigarettes, Cigars     Quit date: 1968     Years since quittin.3     Smokeless tobacco: Never Used   Substance and Sexual Activity     Alcohol use: Yes     Alcohol/week: 0.0 standard drinks     Comment: 2-3 drinks twice weekly     Drug use: No     Sexual activity: Not on file   Lifestyle     Physical activity     Days per week: Not on file     Minutes per session: Not on file     Stress: Not on file   Relationships     Social connections     Talks on phone: Not on file     Gets together: Not on file     Attends [employer] service: Not on file     Active member of club or organization: Not on file     Attends meetings of clubs or organizations: Not on file     Relationship status: Not on file     Intimate partner violence     Fear of current or ex partner: Not on file     Emotionally abused: Not on file     Physically abused: Not on file     Forced sexual activity: Not on file   Other Topics Concern     Parent/sibling w/ CABG, MI or angioplasty before 65F 55M? Not Asked   Social History Narrative     Not on file       Exam:  BP (!) 145/69 (BP Location: Right arm, Patient Position: Sitting, Cuff Size: Adult Regular)   Pulse 71   Wt 69.4 kg (153 lb)   SpO2 100%   BMI 23.96 kg/m    153 lbs 0 oz      ASSESSMENT  1 LUTS improved urinary incontinence  2 Foot drop left leg  3 history colon cancer and malignant polyp needs colonoscopy   4 hypertension well  controlled  5 hyperlipidemia  6 renal insufficiency  7 anemia secondary to above  8 paroxysmal atrial fibrillation on warfarin  9 peripheral arterial disease  10 monoclonal gammopathy stable  11 Hip fracture with nailing 3/14/2019  12 CAD S/P CABGx2 with JEREMY  13 VT S/P ICD  14 osteoporosis on intermittent alendronate  15 venous insufficiency with history  DVT    Plan  He will follow-up with fasting labs and for a Tdap.  Be more aggressive with the exercise and the physical therapy.  We will plan for colonoscopy later this year.  Over 30 minutes spent on the day of service in chart review, patient contact, record completion and review and notification of lab reports    This note was completed using Dragon voice recognition software.      Roberto Sarmiento MD  General Internal Medicine  [facility]  [phone]

## 2021-05-04 ENCOUNTER — ANCILLARY PROCEDURE (OUTPATIENT)
Dept: CARDIOLOGY | Facility: CLINIC | Age: 86
End: 2021-05-04
Attending: INTERNAL MEDICINE
Payer: COMMERCIAL

## 2021-05-04 ENCOUNTER — THERAPY VISIT (OUTPATIENT)
Dept: PHYSICAL THERAPY | Facility: CLINIC | Age: 86
End: 2021-05-04
Payer: COMMERCIAL

## 2021-05-04 DIAGNOSIS — I47.20 VENTRICULAR TACHYARRHYTHMIA (H): ICD-10-CM

## 2021-05-04 DIAGNOSIS — I48.0 PAROXYSMAL ATRIAL FIBRILLATION (H): ICD-10-CM

## 2021-05-04 DIAGNOSIS — M62.81 MUSCLE WEAKNESS (GENERALIZED): Primary | ICD-10-CM

## 2021-05-04 DIAGNOSIS — R53.81 PHYSICAL DECONDITIONING: ICD-10-CM

## 2021-05-04 DIAGNOSIS — Z95.810 ICD (IMPLANTABLE CARDIOVERTER-DEFIBRILLATOR) IN PLACE: ICD-10-CM

## 2021-05-04 PROCEDURE — 93296 REM INTERROG EVL PM/IDS: CPT

## 2021-05-04 PROCEDURE — 97116 GAIT TRAINING THERAPY: CPT | Mod: GP | Performed by: PHYSICAL THERAPIST

## 2021-05-04 PROCEDURE — 93295 DEV INTERROG REMOTE 1/2/MLT: CPT | Performed by: INTERNAL MEDICINE

## 2021-05-10 ENCOUNTER — OFFICE VISIT (OUTPATIENT)
Dept: CARDIOLOGY | Facility: CLINIC | Age: 86
End: 2021-05-10
Attending: INTERNAL MEDICINE
Payer: COMMERCIAL

## 2021-05-10 VITALS
OXYGEN SATURATION: 100 % | WEIGHT: 155 LBS | DIASTOLIC BLOOD PRESSURE: 68 MMHG | BODY MASS INDEX: 24.28 KG/M2 | HEART RATE: 63 BPM | SYSTOLIC BLOOD PRESSURE: 129 MMHG

## 2021-05-10 DIAGNOSIS — I48.0 PAROXYSMAL ATRIAL FIBRILLATION (H): Primary | ICD-10-CM

## 2021-05-10 LAB
MDC_IDC_LEAD_IMPLANT_DT: NORMAL
MDC_IDC_LEAD_IMPLANT_DT: NORMAL
MDC_IDC_LEAD_LOCATION: NORMAL
MDC_IDC_LEAD_LOCATION: NORMAL
MDC_IDC_LEAD_LOCATION_DETAIL_1: NORMAL
MDC_IDC_LEAD_LOCATION_DETAIL_1: NORMAL
MDC_IDC_LEAD_MFG: NORMAL
MDC_IDC_LEAD_MFG: NORMAL
MDC_IDC_LEAD_MODEL: NORMAL
MDC_IDC_LEAD_MODEL: NORMAL
MDC_IDC_LEAD_POLARITY_TYPE: NORMAL
MDC_IDC_LEAD_POLARITY_TYPE: NORMAL
MDC_IDC_LEAD_SERIAL: NORMAL
MDC_IDC_LEAD_SERIAL: NORMAL
MDC_IDC_MSMT_BATTERY_DTM: NORMAL
MDC_IDC_MSMT_BATTERY_REMAINING_LONGEVITY: 89 MO
MDC_IDC_MSMT_BATTERY_RRT_TRIGGER: 2.73
MDC_IDC_MSMT_BATTERY_STATUS: NORMAL
MDC_IDC_MSMT_BATTERY_VOLTAGE: 2.96 V
MDC_IDC_MSMT_CAP_CHARGE_DTM: NORMAL
MDC_IDC_MSMT_CAP_CHARGE_ENERGY: 18 J
MDC_IDC_MSMT_CAP_CHARGE_TIME: 3.78
MDC_IDC_MSMT_CAP_CHARGE_TYPE: NORMAL
MDC_IDC_MSMT_LEADCHNL_RA_IMPEDANCE_VALUE: 456 OHM
MDC_IDC_MSMT_LEADCHNL_RA_PACING_THRESHOLD_AMPLITUDE: 0.62 V
MDC_IDC_MSMT_LEADCHNL_RA_PACING_THRESHOLD_PULSEWIDTH: 0.4 MS
MDC_IDC_MSMT_LEADCHNL_RA_SENSING_INTR_AMPL: 0.62 MV
MDC_IDC_MSMT_LEADCHNL_RA_SENSING_INTR_AMPL: 0.62 MV
MDC_IDC_MSMT_LEADCHNL_RV_IMPEDANCE_VALUE: 285 OHM
MDC_IDC_MSMT_LEADCHNL_RV_IMPEDANCE_VALUE: 342 OHM
MDC_IDC_MSMT_LEADCHNL_RV_PACING_THRESHOLD_AMPLITUDE: 0.88 V
MDC_IDC_MSMT_LEADCHNL_RV_PACING_THRESHOLD_PULSEWIDTH: 0.4 MS
MDC_IDC_MSMT_LEADCHNL_RV_SENSING_INTR_AMPL: 9.12 MV
MDC_IDC_MSMT_LEADCHNL_RV_SENSING_INTR_AMPL: 9.12 MV
MDC_IDC_PG_IMPLANT_DTM: NORMAL
MDC_IDC_PG_MFG: NORMAL
MDC_IDC_PG_MODEL: NORMAL
MDC_IDC_PG_SERIAL: NORMAL
MDC_IDC_PG_TYPE: NORMAL
MDC_IDC_SESS_CLINIC_NAME: NORMAL
MDC_IDC_SESS_DTM: NORMAL
MDC_IDC_SESS_TYPE: NORMAL
MDC_IDC_SET_BRADY_AT_MODE_SWITCH_RATE: 171 {BEATS}/MIN
MDC_IDC_SET_BRADY_HYSTRATE: NORMAL
MDC_IDC_SET_BRADY_LOWRATE: 60 {BEATS}/MIN
MDC_IDC_SET_BRADY_MAX_SENSOR_RATE: 130 {BEATS}/MIN
MDC_IDC_SET_BRADY_MAX_TRACKING_RATE: 130 {BEATS}/MIN
MDC_IDC_SET_BRADY_MODE: NORMAL
MDC_IDC_SET_BRADY_PAV_DELAY_LOW: 180 MS
MDC_IDC_SET_BRADY_SAV_DELAY_LOW: 150 MS
MDC_IDC_SET_LEADCHNL_RA_PACING_AMPLITUDE: 1.5 V
MDC_IDC_SET_LEADCHNL_RA_PACING_ANODE_ELECTRODE_1: NORMAL
MDC_IDC_SET_LEADCHNL_RA_PACING_ANODE_LOCATION_1: NORMAL
MDC_IDC_SET_LEADCHNL_RA_PACING_CAPTURE_MODE: NORMAL
MDC_IDC_SET_LEADCHNL_RA_PACING_CATHODE_ELECTRODE_1: NORMAL
MDC_IDC_SET_LEADCHNL_RA_PACING_CATHODE_LOCATION_1: NORMAL
MDC_IDC_SET_LEADCHNL_RA_PACING_POLARITY: NORMAL
MDC_IDC_SET_LEADCHNL_RA_PACING_PULSEWIDTH: 0.4 MS
MDC_IDC_SET_LEADCHNL_RA_SENSING_ANODE_ELECTRODE_1: NORMAL
MDC_IDC_SET_LEADCHNL_RA_SENSING_ANODE_LOCATION_1: NORMAL
MDC_IDC_SET_LEADCHNL_RA_SENSING_CATHODE_ELECTRODE_1: NORMAL
MDC_IDC_SET_LEADCHNL_RA_SENSING_CATHODE_LOCATION_1: NORMAL
MDC_IDC_SET_LEADCHNL_RA_SENSING_POLARITY: NORMAL
MDC_IDC_SET_LEADCHNL_RA_SENSING_SENSITIVITY: 0.3 MV
MDC_IDC_SET_LEADCHNL_RV_PACING_AMPLITUDE: 2 V
MDC_IDC_SET_LEADCHNL_RV_PACING_ANODE_ELECTRODE_1: NORMAL
MDC_IDC_SET_LEADCHNL_RV_PACING_ANODE_LOCATION_1: NORMAL
MDC_IDC_SET_LEADCHNL_RV_PACING_CAPTURE_MODE: NORMAL
MDC_IDC_SET_LEADCHNL_RV_PACING_CATHODE_ELECTRODE_1: NORMAL
MDC_IDC_SET_LEADCHNL_RV_PACING_CATHODE_LOCATION_1: NORMAL
MDC_IDC_SET_LEADCHNL_RV_PACING_POLARITY: NORMAL
MDC_IDC_SET_LEADCHNL_RV_PACING_PULSEWIDTH: 0.4 MS
MDC_IDC_SET_LEADCHNL_RV_SENSING_ANODE_ELECTRODE_1: NORMAL
MDC_IDC_SET_LEADCHNL_RV_SENSING_ANODE_LOCATION_1: NORMAL
MDC_IDC_SET_LEADCHNL_RV_SENSING_CATHODE_ELECTRODE_1: NORMAL
MDC_IDC_SET_LEADCHNL_RV_SENSING_CATHODE_LOCATION_1: NORMAL
MDC_IDC_SET_LEADCHNL_RV_SENSING_POLARITY: NORMAL
MDC_IDC_SET_LEADCHNL_RV_SENSING_SENSITIVITY: 0.45 MV
MDC_IDC_SET_ZONE_DETECTION_BEATS_DENOMINATOR: 24 {BEATS}
MDC_IDC_SET_ZONE_DETECTION_BEATS_NUMERATOR: 18 {BEATS}
MDC_IDC_SET_ZONE_DETECTION_INTERVAL: 300 MS
MDC_IDC_SET_ZONE_DETECTION_INTERVAL: 320 MS
MDC_IDC_SET_ZONE_DETECTION_INTERVAL: 350 MS
MDC_IDC_SET_ZONE_DETECTION_INTERVAL: 430 MS
MDC_IDC_SET_ZONE_DETECTION_INTERVAL: NORMAL
MDC_IDC_SET_ZONE_TYPE: NORMAL
MDC_IDC_STAT_AT_BURDEN_PERCENT: 0.1 %
MDC_IDC_STAT_AT_DTM_END: NORMAL
MDC_IDC_STAT_AT_DTM_START: NORMAL
MDC_IDC_STAT_BRADY_AP_VP_PERCENT: 47.3 %
MDC_IDC_STAT_BRADY_AP_VS_PERCENT: 27 %
MDC_IDC_STAT_BRADY_AS_VP_PERCENT: 9.23 %
MDC_IDC_STAT_BRADY_AS_VS_PERCENT: 16.47 %
MDC_IDC_STAT_BRADY_DTM_END: NORMAL
MDC_IDC_STAT_BRADY_DTM_START: NORMAL
MDC_IDC_STAT_BRADY_RA_PERCENT_PACED: 68.87 %
MDC_IDC_STAT_BRADY_RV_PERCENT_PACED: 56.17 %
MDC_IDC_STAT_EPISODE_RECENT_COUNT: 0
MDC_IDC_STAT_EPISODE_RECENT_COUNT: 11
MDC_IDC_STAT_EPISODE_RECENT_COUNT_DTM_END: NORMAL
MDC_IDC_STAT_EPISODE_RECENT_COUNT_DTM_START: NORMAL
MDC_IDC_STAT_EPISODE_TOTAL_COUNT: 0
MDC_IDC_STAT_EPISODE_TOTAL_COUNT: 5
MDC_IDC_STAT_EPISODE_TOTAL_COUNT: 60
MDC_IDC_STAT_EPISODE_TOTAL_COUNT_DTM_END: NORMAL
MDC_IDC_STAT_EPISODE_TOTAL_COUNT_DTM_START: NORMAL
MDC_IDC_STAT_EPISODE_TYPE: NORMAL
MDC_IDC_STAT_TACHYTHERAPY_ATP_DELIVERED_RECENT: 0
MDC_IDC_STAT_TACHYTHERAPY_ATP_DELIVERED_TOTAL: 0
MDC_IDC_STAT_TACHYTHERAPY_RECENT_DTM_END: NORMAL
MDC_IDC_STAT_TACHYTHERAPY_RECENT_DTM_START: NORMAL
MDC_IDC_STAT_TACHYTHERAPY_SHOCKS_ABORTED_RECENT: 0
MDC_IDC_STAT_TACHYTHERAPY_SHOCKS_ABORTED_TOTAL: 0
MDC_IDC_STAT_TACHYTHERAPY_SHOCKS_DELIVERED_RECENT: 0
MDC_IDC_STAT_TACHYTHERAPY_SHOCKS_DELIVERED_TOTAL: 0
MDC_IDC_STAT_TACHYTHERAPY_TOTAL_DTM_END: NORMAL
MDC_IDC_STAT_TACHYTHERAPY_TOTAL_DTM_START: NORMAL

## 2021-05-10 PROCEDURE — 99214 OFFICE O/P EST MOD 30 MIN: CPT | Mod: GC | Performed by: INTERNAL MEDICINE

## 2021-05-10 NOTE — NURSING NOTE
"Chief Complaint   Patient presents with     RECHECK     6 month follow up.        Initial /68 (BP Location: Left arm, Patient Position: Chair, Cuff Size: Adult Regular)   Pulse 63   Wt 70.3 kg (155 lb)   SpO2 100%   BMI 24.28 kg/m   Estimated body mass index is 24.28 kg/m  as calculated from the following:    Height as of 2/2/21: 1.702 m (5' 7\").    Weight as of this encounter: 70.3 kg (155 lb)..  BP completed using cuff size: regular    Sparkle Riggins MA  "

## 2021-05-10 NOTE — PATIENT INSTRUCTIONS
Thank you for coming to the [facility] @ [facility]; please note the following instructions:    1. Dr. Baird would like to see you back in 1 year.  We will contact you when the time gets closer.        If you have any questions regarding your visit please contact your care team:     Cardiology  Telephone Number   Sendy AIKEN, RN  Sary VILLAFANA, RN   Rylee WANG, RMA  Sparkle GUNTER, RMA  Perri KING, LPN   [phone] (option 1)   For scheduling appts:     [phone] (select option 1)       For the Device Clinic (Pacemakers and ICD's)  RN's :  Mary Chavez   During business hours: [phone]    *After business hours:  [phone] (select option 4)      Normal test result notifications will be released via [employer] or mailed within 7 business days.  All other test results, will be communicated via telephone once reviewed by your cardiologist.    If you need a medication refill please contact your pharmacy.  Please allow 3 business days for your refill to be completed.    As always, thank you for trusting us with your health care needs!

## 2021-05-10 NOTE — PROGRESS NOTES
HPI:   Noe Florence is an 84yo M with a past medical history significant for  HTN, HLD, CKD3, CAD s/p CABG x2, DESx2, polymorphic VT s/p ICD placement (5/2012, gen change 12/2018), and AF/AFL s/p CTI (6/2014) and right atrial appendage atrial tachycardia ablation (9/2014). His ICD reached REBECCA and he underwent an ICD gen change on 12/11/2018. Additionally, the patient has a history of colon cancer which was resected in February of 2019 and several falls in 2019 with a left hip and right humerus fracture from which he is still recovering.     He was last seen in clinic in November of 2020 at which time he was doing well, and no medication changes were made. Today, he returns for a 6 month follow-up. In the interim, he has continued to do well. He is slowly recovering from his hip fracture, and continues to walk with a walker. He gets fatigued when walking long distances, but otherwise has no complaints. He denies palpitations, chest pain, lightheadedness, dizziness, orthopnea, or PND. He denies any bleeding episodes.    PAST MEDICAL HISTORY:  Past Medical History:   Diagnosis Date     Advanced open-angle glaucoma      Atrial fibrillation (H)      CKD (chronic kidney disease), stage III 2005     Colon cancer (H)     Stage II-B colon cancer     Coronary artery disease     s/p CABG x 2, JEREMY x 2     Diverticulitis      Hyperlipidemia      Hypertension      Ischemic cardiomyopathy      MGUS (monoclonal gammopathy of unknown significance)      Nonsenile cataract     BE     Osteoporosis     left hip fracture     Polymorphic ventricular tachycardia (H)      Polymyalgia rheumatica (H)      PVD (posterior vitreous detachment), both eyes 2005     S/P ablation of atrial flutter 6/20/14    CTI     Stented coronary artery      SVT (supraventricular tachycardia) (H)     PPM/AICD for NSVT     Upper leg DVT (deep venous thromboembolism), chronic (H)     Left     Weight loss, unintentional 2017    15 lb in 4 months       CURRENT  MEDICATIONS:  Current Outpatient Medications   Medication Sig Dispense Refill     alendronate (FOSAMAX) 70 MG tablet Take 1 tablet (70 mg) by mouth every 7 days Take with a full glass of water and do not eat or lay down for 30 minutes 12 tablet 3     ARIPiprazole (ABILIFY) 2 MG tablet Take 2 mg by mouth daily       atorvastatin (LIPITOR) 20 MG tablet Take 1 tablet (20 mg) by mouth daily 100 tablet 3     calcium carbonate 500 mg, elemental, (OSCAL;OYSTER SHELL CALCIUM) 500 MG tablet Take 1 tablet (500 mg) by mouth 2 times daily 180 tablet 3     cholecalciferol 1000 units TABS Take 1,000 Units by mouth daily        COMPRESSION STOCKINGS 1 each daily 1 each 4     Cyanocobalamin (VITAMIN B-12 CR PO)        ferrous sulfate (FE TABS) 325 (65 Fe) MG EC tablet Take 325 mg by mouth daily       ketoconazole (NIZORAL) 2 % external cream Apply to the feet 2 times a day until rash clears then 3-4 times a week for maintenance 60 g 3     metoprolol tartrate (LOPRESSOR) 25 MG tablet Take 1 tablet (25 mg) by mouth 2 times daily 180 tablet 3     mirtazapine (REMERON) 15 MG tablet Take 15 mg by mouth At Bedtime.       Multiple Vitamins-Minerals (MULTIVITAMIN ADULT PO)        sertraline (ZOLOFT) 100 MG tablet Take 150 mg by mouth every evening        tamsulosin (FLOMAX) 0.4 MG capsule Take 2 capsules (0.8 mg) by mouth daily 180 capsule 3     warfarin ANTICOAGULANT (COUMADIN) 5 MG tablet Take 1 to 1.5 tablets daily or as directed by the Coumadin clinic 90 tablet 4     tiZANidine (ZANAFLEX) 2 MG tablet Take 1-2 tablets (2-4 mg) by mouth 3 times daily as needed for muscle spasms (Patient not taking: Reported on 2/22/2021) 30 tablet 0       PAST SURGICAL HISTORY:  Past Surgical History:   Procedure Laterality Date     C CABG, ARTERY-VEIN, FOUR  2006    LIMA-LAD, SVG-Rt PDA, SVG-OM2, SVG-Diag 1     C CABG, VEIN, SINGLE  1994    1-vessel CABG, SVG->PDRCA      CATARACT IOL, RT/LT Bilateral      COLONOSCOPY  3/13/2014    Procedure: COMBINED  COLONOSCOPY, SINGLE BIOPSY/POLYPECTOMY BY BIOPSY;;  Surgeon: Mary Gerber MD;  Location:  GI     COLONOSCOPY N/A 6/22/2015    Procedure: COLONOSCOPY;  Surgeon: Marilin Newman MD;  Location:  GI     COLONOSCOPY N/A 11/7/2018    Procedure: COMBINED COLONOSCOPY, SINGLE OR MULTIPLE BIOPSY/POLYPECTOMY BY BIOPSY;  Surgeon: Roberto Esteban MD;  Location:  GI     EP ICD GENERATOR CHANGE DUAL N/A 12/11/2018    Procedure: EP ICD Generator Change Dual;  Surgeon: Deedee Baird MD;  Location:  HEART CARDIAC CATH LAB     H ABLATION ATRIAL FLUTTER       HEART CATH DRUG ELUTING STENT PLACEMENT  4/19/2012    JEREMY x 2 to LCx     IMPLANT IMPLANTABLE CARDIOVERTER DEFIBRILLATOR  5-    ppm/aicd     KNEE SURGERY      right and left knee surgeries     LAPAROSCOPIC ASSISTED COLECTOMY Right 2/1/2019    Procedure: Laparoscopic Converted to Open Transverse Colectomy with Lysis of Adhesions;  Surgeon: Chanelle Guzmán MD;  Location:  OR     LAPAROSCOPIC ASSISTED COLECTOMY LEFT (DESCENDING)  4/8/2014    Procedure: LAPAROSCOPIC ASSISTED COLECTOMY LEFT (DESCENDING);  Hand assisted Laparoscopic Sigmoid Colectomy , Laparoscopic mobilization of spleenic flexure *Latex Allergy*Anesthesia General with Block;  Surgeon: Chanelle Guzmán MD;  Location: [facility] OR     OPEN REDUCTION INTERNAL FIXATION RODDING INTRAMEDULLAR FEMUR FRACTURE TABLE Left 3/14/2019    Procedure: Open Reduction Internal Fixation Left Femur Intramedullar Nailing;  Surgeon: Srikanth Avalos MD;  Location:  OR     REPAIR VALVE MITRAL  2006     30-mm Medtronic Julian ring      SELECTIVE LASER TRABECULOPLASTY (SLT) OD (RIGHT EYE)  4/10, 1/12,+1/9/13    RE     SELECTIVE LASER TRABECULOPLASTY (SLT) OS (LEFT EYE)  5/2012     SHOULDER SURGERY      right rotator cuff       ALLERGIES:     Allergies   Allergen Reactions     Latex Rash     Rash       FAMILY HISTORY:  I have reviewed this patient's family history and updated it  with pertinent information if needed.  Family History   Problem Relation Age of Onset     C.A.D. Father      Anesthesia Reaction No family hx of      Crohn's Disease No family hx of      Ulcerative Colitis No family hx of      Cancer - colorectal No family hx of      Macular Degeneration No family hx of      Cancer No family hx of         No known family hx of skin cancer     Melanoma No family hx of      Skin Cancer No family hx of        SOCIAL HISTORY:  Social History     Tobacco Use     Smoking status: Former Smoker     Types: Cigarettes, Cigars     Quit date: 1968     Years since quittin.3     Smokeless tobacco: Never Used   Substance Use Topics     Alcohol use: Yes     Alcohol/week: 0.0 standard drinks     Comment: 2-3 drinks twice weekly     Drug use: No       ROS:    ROS: 10 point ROS neg other than the symptoms noted above in the HPI.    Exam:  /68 (BP Location: Left arm, Patient Position: Chair, Cuff Size: Adult Regular)   Pulse 63   Wt 70.3 kg (155 lb)   SpO2 100%   BMI 24.28 kg/m    GENERAL APPEARANCE: frail in appearance, alert and no distress  HEENT: no icterus, no xanthelasmas, normal pupil size  NECK: no asymmetry, masses, or scars, JVP not elevated  RESPIRATORY: lungs clear to auscultation - no rales, rhonchi or wheezes, no use of accessory muscles, no retractions, respirations are unlabored, normal respiratory rate  CARDIOVASCULAR: regular rhythm, GOLDEN over RUSB, no rubs or gallops  ABDOMEN: soft, non tender,  bowel sounds normal  EXTREMITIES: peripheral pulses normal, no edema  NEURO: alert and oriented to person/place/time, normal speech  VASC: Radial pulses are normal in volumes and symmetric bilaterally.   SKIN: no ecchymoses, no rashes    Labs:  CBC RESULTS:   Lab Results   Component Value Date    WBC 7.2 02/15/2021    RBC 3.68 (L) 02/15/2021    HGB 11.4 (L) 02/15/2021    HCT 35.8 (L) 02/15/2021    MCV 97 02/15/2021    MCH 31.0 02/15/2021    MCHC 31.8 02/15/2021    RDW 13.8  02/15/2021     (L) 02/15/2021       BMP RESULTS:  Lab Results   Component Value Date     02/15/2021    POTASSIUM 4.5 02/15/2021    CHLORIDE 111 (H) 02/15/2021    CO2 28 02/15/2021    ANIONGAP 3 02/15/2021    GLC 98 02/15/2021    BUN 52 (H) 02/15/2021    CR 1.50 (H) 02/15/2021    GFRESTIMATED 41 (L) 03/05/2021    GFRESTBLACK 50 (L) 03/05/2021    KEITH 8.9 02/15/2021        INR RESULTS:  Lab Results   Component Value Date    INR 1.64 (H) 04/12/2021    INR 2.00 (H) 03/15/2021    INR 2.03 (H) 02/15/2021    INR 1.95 (H) 01/18/2021       Procedures:  Remote ICD transmission received and reviewed. Device transmission sent per MD orders. Patient is scheduled to see Dr. Baird in clinic on 5/10/21.     Device: [employer] [id number] Evera MRI XT   Normal Device Function  Mode: AAIR>DDDR  bpm  AP: 74.3%  : 56.5%  Presenting EGM: NSR with first degree AVB @ 76 bpm  Thoracic Impedance: Near reference line.   Short V-V intervals: 0  Lead Trends Appear Stable  Estimated battery longevity to RRT = 7.4 years  Atrial Arrhythmia: 11 AT/AF episodes recorded. Total AT/AF time = 53 seconds in duration. No stored EGM's for review.  AF [place]: <0.1%  Anticoagulant: Coumadin  Ventricular Arrhythmia: 0  Pt Notified of Transmission Results: Patient's partner Rica notified of results. She reports that the patient is sleeping.     Plan: Send a remote transmission in 3 months    Assessment and Plan:   Paroxysmal atrial fibrillation/flutter s/p CTI 2014 and focal AT ablation 2014  Hx of VT status post ICD  CAD s/p CABG and PCI    Noe Florence is an 86yo M with a past medical history significant for  HTN, HLD, CKD3, CAD s/p CABG x2, DESx2, polymorphic VT s/p ICD placement (5/2012, gen change 12/2018), and AF/AFL s/p CTI (6/2014) and right atrial appendage atrial tachycardia ablation (9/2014). He is doing very well from a cardiac perspective. Recent device interrogation shows <0.1% AF burden and he is asymptomatic.    - continue  metoprolol 25mg BID  - continue warfarin with ACC monitoring  - continue atorvastatin 40mg    Return to clinic in 1 year, sooner if needed.    Patient seen and examined with the attending cardiologist, Dr. Baird, who is in agreement with the assessment and plan.    Mati Elizabeth MD  Cardiology Fellow      I have seen, interviewed, and examined patient. I have reviewed the laboratory tests, imaging, and other investigations. I have reviewed the management plan with the patient. I discussed with the team and agree with the findings and plan in this resident/fellow/nurse practitioner's note. In addition, changes in the physical examination, assessment and plan have been incorporated into the note by myself, as to make it a single cohesive document.       Deedee Baird MD, MS  Cardiology/Cardiac EP Attending Staff        CC  Patient Care Team:  Roberto Sarmiento MD as PCP - General (Internal Medicine)  Francisco Gilliam MD as MD (Oncology)  Omero Srinivasan MD as MD (Ophthalmology)  Hay Arnett MD as MD (Internal Medicine)  NIKHIL Wheeler MD as MD (Dermatology)  Deedee Baird MD as MD (Cardiology)  Bhavana Mckeon MD as MD (Internal Medicine)  Naveed Ram MD as Resident  Guzman Boudreaux DPM (Podiatry)  Joshua Centeno MD as MD (Family Practice)  Brett Petersen MD as MD (Dermapathology)  Clarice Magallanes APRN CNP as Nurse Practitioner (Nurse Practitioner - Family)  Lashawn Jackson MD as MD (Dermatology)  Marci Palmer, BYRON as Nurse Coordinator (Oncology)  Lashawn Cool, BYRON as Specialty Care Coordinator (Cardiology)  Jaye Alexander APRN CNP as Nurse Practitioner (Nurse Practitioner - Family)  Alona Lowe CNP as Nurse Practitioner (Nurse Practitioner)  Nusrat Mayo, BYRON as Specialty Care Coordinator (Urology)  Octaviano Santos MD as MD (Interventional Cardiology)  Francisco Gilliam MD as Assigned Cancer Care Provider  Gurinder  Benjamin Cui MD as Assigned Musculoskeletal Provider  Torsten, Roberto Mario MD as Assigned PCP  Thalia, Matt Carreon MD as Assigned Surgical Provider  Jase Duarte MD as Assigned Neuroscience Provider  Octaviano Santos MD as Assigned Heart and Vascular Provider  Thalia, Matt Carreon MD as MD (Dermatology)  CYNTHIA CARRION

## 2021-05-10 NOTE — LETTER
5/10/2021      RE: Noe Florence  [address] [facility] [address]       Dear Colleague,    Thank you for the opportunity to participate in the care of your patient, Noe Florence, at the [facility]. Please see a copy of my visit note below.    HPI:   Noe Florence is an 84yo M with a past medical history significant for  HTN, HLD, CKD3, CAD s/p CABG x2, DESx2, polymorphic VT s/p ICD placement (5/2012, gen change 12/2018), and AF/AFL s/p CTI (6/2014) and right atrial appendage atrial tachycardia ablation (9/2014). His ICD reached REBECCA and he underwent an ICD gen change on 12/11/2018. Additionally, the patient has a history of colon cancer which was resected in February of 2019 and several falls in 2019 with a left hip and right humerus fracture from which he is still recovering.     He was last seen in clinic in November of 2020 at which time he was doing well, and no medication changes were made. Today, he returns for a 6 month follow-up. In the interim, he has continued to do well. He is slowly recovering from his hip fracture, and continues to walk with a walker. He gets fatigued when walking long distances, but otherwise has no complaints. He denies palpitations, chest pain, lightheadedness, dizziness, orthopnea, or PND. He denies any bleeding episodes.    PAST MEDICAL HISTORY:  Past Medical History:   Diagnosis Date     Advanced open-angle glaucoma      Atrial fibrillation (H)      CKD (chronic kidney disease), stage III 2005     Colon cancer (H)     Stage II-B colon cancer     Coronary artery disease     s/p CABG x 2, JEREMY x 2     Diverticulitis      Hyperlipidemia      Hypertension      Ischemic cardiomyopathy      MGUS (monoclonal gammopathy of unknown significance)      Nonsenile cataract     BE     Osteoporosis     left hip fracture     Polymorphic ventricular tachycardia (H)      Polymyalgia rheumatica (H)       PVD (posterior vitreous detachment), both eyes 2005     S/P ablation of atrial flutter 6/20/14    CTI     Stented coronary artery      SVT (supraventricular tachycardia) (H)     PPM/AICD for NSVT     Upper leg DVT (deep venous thromboembolism), chronic (H)     Left     Weight loss, unintentional 2017    15 lb in 4 months       CURRENT MEDICATIONS:  Current Outpatient Medications   Medication Sig Dispense Refill     alendronate (FOSAMAX) 70 MG tablet Take 1 tablet (70 mg) by mouth every 7 days Take with a full glass of water and do not eat or lay down for 30 minutes 12 tablet 3     ARIPiprazole (ABILIFY) 2 MG tablet Take 2 mg by mouth daily       atorvastatin (LIPITOR) 20 MG tablet Take 1 tablet (20 mg) by mouth daily 100 tablet 3     calcium carbonate 500 mg, elemental, (OSCAL;OYSTER SHELL CALCIUM) 500 MG tablet Take 1 tablet (500 mg) by mouth 2 times daily 180 tablet 3     cholecalciferol 1000 units TABS Take 1,000 Units by mouth daily        COMPRESSION STOCKINGS 1 each daily 1 each 4     Cyanocobalamin (VITAMIN B-12 CR PO)        ferrous sulfate (FE TABS) 325 (65 Fe) MG EC tablet Take 325 mg by mouth daily       ketoconazole (NIZORAL) 2 % external cream Apply to the feet 2 times a day until rash clears then 3-4 times a week for maintenance 60 g 3     metoprolol tartrate (LOPRESSOR) 25 MG tablet Take 1 tablet (25 mg) by mouth 2 times daily 180 tablet 3     mirtazapine (REMERON) 15 MG tablet Take 15 mg by mouth At Bedtime.       Multiple Vitamins-Minerals (MULTIVITAMIN ADULT PO)        sertraline (ZOLOFT) 100 MG tablet Take 150 mg by mouth every evening        tamsulosin (FLOMAX) 0.4 MG capsule Take 2 capsules (0.8 mg) by mouth daily 180 capsule 3     warfarin ANTICOAGULANT (COUMADIN) 5 MG tablet Take 1 to 1.5 tablets daily or as directed by the Coumadin clinic 90 tablet 4     tiZANidine (ZANAFLEX) 2 MG tablet Take 1-2 tablets (2-4 mg) by mouth 3 times daily as needed for muscle spasms (Patient not taking:  Reported on 2/22/2021) 30 tablet 0       PAST SURGICAL HISTORY:  Past Surgical History:   Procedure Laterality Date     C CABG, ARTERY-VEIN, FOUR  2006    LIMA-LAD, SVG-Rt PDA, SVG-OM2, SVG-Diag 1     C CABG, VEIN, SINGLE  1994    1-vessel CABG, SVG->PDRCA      CATARACT IOL, RT/LT Bilateral      COLONOSCOPY  3/13/2014    Procedure: COMBINED COLONOSCOPY, SINGLE BIOPSY/POLYPECTOMY BY BIOPSY;;  Surgeon: Mary Gerber MD;  Location:  GI     COLONOSCOPY N/A 6/22/2015    Procedure: COLONOSCOPY;  Surgeon: Marilin Newman MD;  Location:  GI     COLONOSCOPY N/A 11/7/2018    Procedure: COMBINED COLONOSCOPY, SINGLE OR MULTIPLE BIOPSY/POLYPECTOMY BY BIOPSY;  Surgeon: Roberto Esteban MD;  Location:  GI     EP ICD GENERATOR CHANGE DUAL N/A 12/11/2018    Procedure: EP ICD Generator Change Dual;  Surgeon: Deedee Baird MD;  Location:  HEART CARDIAC CATH LAB     H ABLATION ATRIAL FLUTTER       HEART CATH DRUG ELUTING STENT PLACEMENT  4/19/2012    JEREMY x 2 to LCx     IMPLANT IMPLANTABLE CARDIOVERTER DEFIBRILLATOR  5-    ppm/aicd     KNEE SURGERY      right and left knee surgeries     LAPAROSCOPIC ASSISTED COLECTOMY Right 2/1/2019    Procedure: Laparoscopic Converted to Open Transverse Colectomy with Lysis of Adhesions;  Surgeon: Chanelle Guzmán MD;  Location:  OR     LAPAROSCOPIC ASSISTED COLECTOMY LEFT (DESCENDING)  4/8/2014    Procedure: LAPAROSCOPIC ASSISTED COLECTOMY LEFT (DESCENDING);  Hand assisted Laparoscopic Sigmoid Colectomy , Laparoscopic mobilization of spleenic flexure *Latex Allergy*Anesthesia General with Block;  Surgeon: Chanelle Guzmán MD;  Location:  OR     OPEN REDUCTION INTERNAL FIXATION RODDING INTRAMEDULLAR FEMUR FRACTURE TABLE Left 3/14/2019    Procedure: Open Reduction Internal Fixation Left Femur Intramedullar Nailing;  Surgeon: Srikanth Avalos MD;  Location:  OR     REPAIR VALVE MITRAL  2006     30-mm Medtronic Julian ring       SELECTIVE LASER TRABECULOPLASTY (SLT) OD (RIGHT EYE)  4/10, ,+13    RE     SELECTIVE LASER TRABECULOPLASTY (SLT) OS (LEFT EYE)  2012     SHOULDER SURGERY      right rotator cuff       ALLERGIES:     Allergies   Allergen Reactions     Latex Rash     Rash       FAMILY HISTORY:  I have reviewed this patient's family history and updated it with pertinent information if needed.  Family History   Problem Relation Age of Onset     C.A.D. Father      Anesthesia Reaction No family hx of      Crohn's Disease No family hx of      Ulcerative Colitis No family hx of      Cancer - colorectal No family hx of      Macular Degeneration No family hx of      Cancer No family hx of         No known family hx of skin cancer     Melanoma No family hx of      Skin Cancer No family hx of        SOCIAL HISTORY:  Social History     Tobacco Use     Smoking status: Former Smoker     Types: Cigarettes, Cigars     Quit date: 1968     Years since quittin.3     Smokeless tobacco: Never Used   Substance Use Topics     Alcohol use: Yes     Alcohol/week: 0.0 standard drinks     Comment: 2-3 drinks twice weekly     Drug use: No       ROS:    ROS: 10 point ROS neg other than the symptoms noted above in the HPI.    Exam:  /68 (BP Location: Left arm, Patient Position: Chair, Cuff Size: Adult Regular)   Pulse 63   Wt 70.3 kg (155 lb)   SpO2 100%   BMI 24.28 kg/m    GENERAL APPEARANCE: frail in appearance, alert and no distress  HEENT: no icterus, no xanthelasmas, normal pupil size  NECK: no asymmetry, masses, or scars, JVP not elevated  RESPIRATORY: lungs clear to auscultation - no rales, rhonchi or wheezes, no use of accessory muscles, no retractions, respirations are unlabored, normal respiratory rate  CARDIOVASCULAR: regular rhythm, GOLDEN over RUSB, no rubs or gallops  ABDOMEN: soft, non tender,  bowel sounds normal  EXTREMITIES: peripheral pulses normal, no edema  NEURO: alert and oriented to person/place/time, normal  speech  VASC: Radial pulses are normal in volumes and symmetric bilaterally.   SKIN: no ecchymoses, no rashes    Labs:  CBC RESULTS:   Lab Results   Component Value Date    WBC 7.2 02/15/2021    RBC 3.68 (L) 02/15/2021    HGB 11.4 (L) 02/15/2021    HCT 35.8 (L) 02/15/2021    MCV 97 02/15/2021    MCH 31.0 02/15/2021    MCHC 31.8 02/15/2021    RDW 13.8 02/15/2021     (L) 02/15/2021       BMP RESULTS:  Lab Results   Component Value Date     02/15/2021    POTASSIUM 4.5 02/15/2021    CHLORIDE 111 (H) 02/15/2021    CO2 28 02/15/2021    ANIONGAP 3 02/15/2021    GLC 98 02/15/2021    BUN 52 (H) 02/15/2021    CR 1.50 (H) 02/15/2021    GFRESTIMATED 41 (L) 03/05/2021    GFRESTBLACK 50 (L) 03/05/2021    KEITH 8.9 02/15/2021        INR RESULTS:  Lab Results   Component Value Date    INR 1.64 (H) 04/12/2021    INR 2.00 (H) 03/15/2021    INR 2.03 (H) 02/15/2021    INR 1.95 (H) 01/18/2021       Procedures:  Remote ICD transmission received and reviewed. Device transmission sent per MD orders. Patient is scheduled to see Dr. Baird in clinic on 5/10/21.     Device: Medtronic [id number] Evera MRI XT   Normal Device Function  Mode: AAIR>DDDR  bpm  AP: 74.3%  : 56.5%  Presenting EGM: NSR with first degree AVB @ 76 bpm  Thoracic Impedance: Near reference line.   Short V-V intervals: 0  Lead Trends Appear Stable  Estimated battery longevity to RRT = 7.4 years  Atrial Arrhythmia: 11 AT/AF episodes recorded. Total AT/AF time = 53 seconds in duration. No stored EGM's for review.  AF [place]: <0.1%  Anticoagulant: Coumadin  Ventricular Arrhythmia: 0  Pt Notified of Transmission Results: Patient's partner Rica notified of results. She reports that the patient is sleeping.     Plan: Send a remote transmission in 3 months    Assessment and Plan:   Paroxysmal atrial fibrillation/flutter s/p CTI 2014 and focal AT ablation 2014  Hx of VT status post ICD  CAD s/p CABG and PCI    Noe Ludivina is an 84yo M with a past medical  history significant for  HTN, HLD, CKD3, CAD s/p CABG x2, DESx2, polymorphic VT s/p ICD placement (5/2012, gen change 12/2018), and AF/AFL s/p CTI (6/2014) and right atrial appendage atrial tachycardia ablation (9/2014). He is doing very well from a cardiac perspective. Recent device interrogation shows <0.1% AF burden and he is asymptomatic.    - continue metoprolol 25mg BID  - continue warfarin with ACC monitoring  - continue atorvastatin 40mg    Return to clinic in 1 year, sooner if needed.    Patient seen and examined with the attending cardiologist, Dr. Baird, who is in agreement with the assessment and plan.    Mati Elizabeth MD  Cardiology Fellow      I have seen, interviewed, and examined patient. I have reviewed the laboratory tests, imaging, and other investigations. I have reviewed the management plan with the patient. I discussed with the team and agree with the findings and plan in this resident/fellow/nurse practitioner's note. In addition, changes in the physical examination, assessment and plan have been incorporated into the note by myself, as to make it a single cohesive document.       Deedee Baird MD, MS  Cardiology/Cardiac EP Attending Staff        CC  Patient Care Team:  Roberto Sarmiento MD as PCP - General (Internal Medicine)  Francisco Gilliam MD as MD (Oncology)  Omero Srinivasan MD as MD (Ophthalmology)  Hay Arnett MD as MD (Internal Medicine)  NIKHIL Wheeler MD as MD (Dermatology)  Deedee Baird MD as MD (Cardiology)  Bhavana Mckeon MD as MD (Internal Medicine)  Naveed Ram MD as Resident  Guzman Boudreaux DPM (Podiatry)  Joshua Centeno MD as MD (Family Practice)  Brett Petersen MD as MD (Dermapathology)  Clarice Magallanes APRN CNP as Nurse Practitioner (Nurse Practitioner - Family)  Lashawn Jackson MD as MD (Dermatology)  Marci Palmer, RN as Nurse Coordinator (Oncology)  Lashawn Cool, BYRON as Specialty Care  Coordinator (Cardiology)  Jaye Alexander APRN CNP as Nurse Practitioner (Nurse Practitioner - Family)  Alona Lowe CNP as Nurse Practitioner (Nurse Practitioner)  Nusrat Mayo, RN as Specialty Care Coordinator (Urology)  Octaviano Santos MD as MD (Interventional Cardiology)  Francisco Gilliam MD as Assigned Cancer Care Provider  Benjamin Fairchild MD as Assigned Musculoskeletal Provider  Torsten, Roberto Mario MD as Assigned PCP  Matt Vásquez MD as Assigned Surgical Provider  Jase Duarte MD as Assigned Neuroscience Provider  Octaviano Santos MD as Assigned Heart and Vascular Provider  Matt Vásquez MD as MD (Dermatology)

## 2021-05-12 ENCOUNTER — ANTICOAGULATION THERAPY VISIT (OUTPATIENT)
Dept: ANTICOAGULATION | Facility: CLINIC | Age: 86
End: 2021-05-12

## 2021-05-12 DIAGNOSIS — I82.409 DEEP VEIN THROMBOSIS (DVT) (H): ICD-10-CM

## 2021-05-12 DIAGNOSIS — Z79.01 LONG TERM CURRENT USE OF ANTICOAGULANT THERAPY: ICD-10-CM

## 2021-05-12 DIAGNOSIS — I10 HYPERTENSION, UNSPECIFIED TYPE: ICD-10-CM

## 2021-05-12 DIAGNOSIS — I48.91 ATRIAL FIBRILLATION, UNSPECIFIED TYPE (H): ICD-10-CM

## 2021-05-12 DIAGNOSIS — I21.4 NSTEMI (NON-ST ELEVATED MYOCARDIAL INFARCTION) (H): ICD-10-CM

## 2021-05-12 DIAGNOSIS — E78.5 HYPERLIPIDEMIA, UNSPECIFIED HYPERLIPIDEMIA TYPE: ICD-10-CM

## 2021-05-12 LAB
ALBUMIN SERPL-MCNC: 3.7 G/DL (ref 3.4–5)
ALP SERPL-CCNC: 102 U/L (ref 40–150)
ALT SERPL W P-5'-P-CCNC: 21 U/L (ref 0–70)
ANION GAP SERPL CALCULATED.3IONS-SCNC: 5 MMOL/L (ref 3–14)
AST SERPL W P-5'-P-CCNC: 16 U/L (ref 0–45)
BILIRUB SERPL-MCNC: 0.4 MG/DL (ref 0.2–1.3)
BUN SERPL-MCNC: 48 MG/DL (ref 7–30)
CALCIUM SERPL-MCNC: 9.2 MG/DL (ref 8.5–10.1)
CHLORIDE SERPL-SCNC: 113 MMOL/L (ref 94–109)
CHOLEST SERPL-MCNC: 131 MG/DL
CO2 SERPL-SCNC: 26 MMOL/L (ref 20–32)
CREAT SERPL-MCNC: 1.49 MG/DL (ref 0.66–1.25)
ERYTHROCYTE [DISTWIDTH] IN BLOOD BY AUTOMATED COUNT: 13.5 % (ref 10–15)
GFR SERPL CREATININE-BSD FRML MDRD: 42 ML/MIN/{1.73_M2}
GLUCOSE SERPL-MCNC: 93 MG/DL (ref 70–99)
HCT VFR BLD AUTO: 36.5 % (ref 40–53)
HDLC SERPL-MCNC: 59 MG/DL
HGB BLD-MCNC: 11.5 G/DL (ref 13.3–17.7)
INR PPP: 1.58 (ref 0.86–1.14)
LDLC SERPL CALC-MCNC: 52 MG/DL
MCH RBC QN AUTO: 30.8 PG (ref 26.5–33)
MCHC RBC AUTO-ENTMCNC: 31.5 G/DL (ref 31.5–36.5)
MCV RBC AUTO: 98 FL (ref 78–100)
NONHDLC SERPL-MCNC: 72 MG/DL
PLATELET # BLD AUTO: 148 10E9/L (ref 150–450)
POTASSIUM SERPL-SCNC: 4.5 MMOL/L (ref 3.4–5.3)
PROT SERPL-MCNC: 7.6 G/DL (ref 6.8–8.8)
RBC # BLD AUTO: 3.73 10E12/L (ref 4.4–5.9)
SODIUM SERPL-SCNC: 144 MMOL/L (ref 133–144)
TRIGL SERPL-MCNC: 100 MG/DL
WBC # BLD AUTO: 6.2 10E9/L (ref 4–11)

## 2021-05-12 PROCEDURE — 85027 COMPLETE CBC AUTOMATED: CPT | Performed by: PATHOLOGY

## 2021-05-12 PROCEDURE — 85610 PROTHROMBIN TIME: CPT | Performed by: PATHOLOGY

## 2021-05-12 PROCEDURE — 36415 COLL VENOUS BLD VENIPUNCTURE: CPT | Performed by: PATHOLOGY

## 2021-05-12 PROCEDURE — 80061 LIPID PANEL: CPT | Performed by: PATHOLOGY

## 2021-05-12 PROCEDURE — 80053 COMPREHEN METABOLIC PANEL: CPT | Performed by: PATHOLOGY

## 2021-05-12 NOTE — PROGRESS NOTES
ANTICOAGULATION FOLLOW-UP CLINIC VISIT    Patient Name:  Noe Florence  Date:  2021  Contact Type:  Telephone    SUBJECTIVE:  Patient Findings     Comments:  Dismissed BPA for annual renewal.  Pending orders sent to provider for electronic signature. Spoke to Rica.  Made appt for next INR on  when Sha is at the [facility].        Clinical Outcomes     Comments:  Dismissed BPA for annual renewal.  Pending orders sent to provider for electronic signature. Spoke to Rica.  Made appt for next INR on  when Bill is at the [facility].           OBJECTIVE    Recent labs: (last 7 days)     21  1127   INR 1.58*       ASSESSMENT / PLAN  INR assessment THER    Recheck INR In: 4 WEEKS    INR Location Clinic      Anticoagulation Summary  As of 2021    INR goal:  1.5-2.5   TTR:  92.2 % (1 y)   INR used for dosin.58 (2021)   Warfarin maintenance plan:  7.5 mg (5 mg x 1.5) every Wed; 5 mg (5 mg x 1) all other days   Full warfarin instructions:  : 7.5 mg; Otherwise 7.5 mg every Wed; 5 mg all other days   Weekly warfarin total:  37.5 mg   Plan last modified:  Ciro Bajwa RN (2021)   Next INR check:  2021   Priority:  Maintenance   Target end date:  Indefinite    Indications    Atrial fibrillation (H) [I48.91] [I48.91]  Long-term (current) use of anticoagulants [Z79.01] [Z79.01]  Deep vein thrombosis (DVT) (H) [I82.409]  Atrial fibrillation  unspecified type (H) [I48.91]             Anticoagulation Episode Summary     INR check location:      Preferred lab:      Send INR reminders to:  [facility]    Comments:  Patient contact number: [phone] Hx of Falls/Bleed  Can leave results with Rica (friend)  Restarted Warfarin due to DVT.       Anticoagulation Care Providers     Provider Role Specialty Phone number    Frdia Desai MD Referring Cardiovascular Disease [phone]            See the Encounter Report to view Anticoagulation Flowsheet and  Dosing Calendar (Go to Encounters tab in chart review, and find the Anticoagulation Therapy Visit)    INR/CFX/F2 RESULT:INR is 1.58    ASSESSMENT:Spoke with Rica. Gave them their lab results and new warfarin recommendation.  No changes in health, medication, or diet. No missed doses, no falls. No signs or symptoms of bleed or clotting.    DOSING ADJUSTMENT:New maintenance dose: 7.5mg on Wed, 5mg all other days    NEXT INR/FACTOR X OR FACTOR II:6/7    PROTOCOL FOLLOWED:goal 1.5-2.5    Ciro Bajwa RN

## 2021-05-14 ENCOUNTER — ALLIED HEALTH/NURSE VISIT (OUTPATIENT)
Dept: INTERNAL MEDICINE | Facility: CLINIC | Age: 86
End: 2021-05-14
Payer: COMMERCIAL

## 2021-05-14 DIAGNOSIS — Z23 NEED FOR VACCINATION: Primary | ICD-10-CM

## 2021-05-14 PROCEDURE — 90715 TDAP VACCINE 7 YRS/> IM: CPT

## 2021-05-14 PROCEDURE — 90471 IMMUNIZATION ADMIN: CPT

## 2021-05-14 PROCEDURE — 99207 PR NO CHARGE INJECTABLE MED/DRUG: CPT

## 2021-05-14 NOTE — NURSING NOTE
Chief Complaint   Patient presents with     Nurse Visit     pt here for tdap vaccine       Rica Sanford CMA, EMT at 3:11 PM on 5/14/2021.

## 2021-05-14 NOTE — PROGRESS NOTES
Noe Florence comes into clinic today at the request of Dr. Perez Ordering Provider for Med Injection only tdap vaccine.    Pt tolerated well, no redness or swelling near site.    This service provided today was under the supervising provider of the day Dr. Mathur, who was available if needed.    Rica Sanford, EMT

## 2021-06-07 ENCOUNTER — ANTICOAGULATION THERAPY VISIT (OUTPATIENT)
Dept: ANTICOAGULATION | Facility: CLINIC | Age: 86
End: 2021-06-07

## 2021-06-07 DIAGNOSIS — Z79.01 LONG TERM CURRENT USE OF ANTICOAGULANT THERAPY: ICD-10-CM

## 2021-06-07 DIAGNOSIS — I48.91 ATRIAL FIBRILLATION, UNSPECIFIED TYPE (H): ICD-10-CM

## 2021-06-07 DIAGNOSIS — I82.409 DEEP VEIN THROMBOSIS (DVT) (H): ICD-10-CM

## 2021-06-07 LAB — INR PPP: 1.9 (ref 0.86–1.14)

## 2021-06-07 PROCEDURE — 36416 COLLJ CAPILLARY BLOOD SPEC: CPT | Performed by: PATHOLOGY

## 2021-06-07 PROCEDURE — 85610 PROTHROMBIN TIME: CPT | Performed by: PATHOLOGY

## 2021-06-07 NOTE — PROGRESS NOTES
ANTICOAGULATION FOLLOW-UP CLINIC VISIT    Patient Name:  Noe Florence  Date:  2021  Contact Type:  Telephone    SUBJECTIVE:  Patient Findings     Comments:  Spoke with Rica.  She reports Sha has not had changes in health, diet, medications.  He will have a tooth extracted this summer, they do not know when.  Rica states the dentist would like the INR to be < 2.  They will call the [facility] when the extraction is scheduled, so a plan can be made.        Clinical Outcomes     Comments:  Spoke with Rica.  She reports Sha has not had changes in health, diet, medications.  He will have a tooth extracted this summer, they do not know when.  Rica states the dentist would like the INR to be < 2.  They will call the [facility] when the extraction is scheduled, so a plan can be made.           OBJECTIVE    Recent labs: (last 7 days)     21  1648   INR 1.90*       ASSESSMENT / PLAN  INR assessment THER    Recheck INR In: 4 WEEKS    INR Location Clinic      Anticoagulation Summary  As of 2021    INR goal:  1.5-2.5   TTR:  92.2 % (1 y)   INR used for dosin.90 (2021)   Warfarin maintenance plan:  7.5 mg (5 mg x 1.5) every Wed; 5 mg (5 mg x 1) all other days   Full warfarin instructions:  7.5 mg every Wed; 5 mg all other days   Weekly warfarin total:  37.5 mg   No change documented:  Danya Edwards RN   Plan last modified:  Ciro Bajwa RN (2021)   Next INR check:  2021   Priority:  Maintenance   Target end date:  Indefinite    Indications    Atrial fibrillation (H) [I48.91] [I48.91]  Long-term (current) use of anticoagulants [Z79.01] [Z79.01]  Deep vein thrombosis (DVT) (H) [I82.409]  Atrial fibrillation  unspecified type (H) [I48.91]             Anticoagulation Episode Summary     INR check location:      Preferred lab:      Send INR reminders to:  [facility]    Comments:  Patient contact number: [phone] Hx of Falls/Bleed  Can leave results with Rica (friend)  Restarted  Warfarin due to DVT.       Anticoagulation Care Providers     Provider Role Specialty Phone number    Frida Desai MD Referring Cardiovascular Disease [phone]            See the Encounter Report to view Anticoagulation Flowsheet and Dosing Calendar (Go to Encounters tab in chart review, and find the Anticoagulation Therapy Visit)    Spoke with Puala Edwards RN

## 2021-06-17 ENCOUNTER — OFFICE VISIT (OUTPATIENT)
Dept: DERMATOLOGY | Facility: CLINIC | Age: 86
End: 2021-06-17
Payer: COMMERCIAL

## 2021-06-17 DIAGNOSIS — L82.1 SEBORRHEIC KERATOSIS: ICD-10-CM

## 2021-06-17 DIAGNOSIS — L81.4 SOLAR LENTIGO: Primary | ICD-10-CM

## 2021-06-17 DIAGNOSIS — B35.3 TINEA PEDIS OF BOTH FEET: ICD-10-CM

## 2021-06-17 DIAGNOSIS — I87.2 VENOUS STASIS DERMATITIS OF BOTH LOWER EXTREMITIES: ICD-10-CM

## 2021-06-17 DIAGNOSIS — D22.9 MULTIPLE BENIGN NEVI: ICD-10-CM

## 2021-06-17 PROCEDURE — 99214 OFFICE O/P EST MOD 30 MIN: CPT | Mod: GC | Performed by: DERMATOLOGY

## 2021-06-17 RX ORDER — KETOCONAZOLE 20 MG/G
CREAM TOPICAL
Qty: 60 G | Refills: 11 | Status: ON HOLD | OUTPATIENT
Start: 2021-06-17 | End: 2024-01-01

## 2021-06-17 ASSESSMENT — PAIN SCALES - GENERAL: PAINLEVEL: NO PAIN (0)

## 2021-06-17 NOTE — PATIENT INSTRUCTIONS
From today's visit:  - continue applying ketoconazole 2% cream twice daily for two weeks when rash is flaring, otherwise may use 3-4 times a week for maintenance    - for moisturization and for flaky seborrheic keratoses, you can start using CeraVe SA Rough and Bumpy Cream

## 2021-06-17 NOTE — NURSING NOTE
Chief Complaint   Patient presents with     RECHECK     pt states he is here for a follow up for a skin check     Eileen Alejo MA

## 2021-06-17 NOTE — LETTER
6/17/2021       RE: Noe Florence  [address] [facility] [address]     Dear Colleague,    Thank you for referring your patient, Noe Florence, to the [facility] [place] at [facility]. Please see a copy of my visit note below.    [facility] Dermatology Note  Encounter Date: Jun 17, 2021  Office Visit     Dermatology Problem List:  1. Innumerable seborrheic keratoses   - cryotherapy 10/27/30    2. History of tinea pedis (10/2020), corporis (8/2013, 2015), and cruris (8/2013)  - current: ketoconazole 2% cream BID to feet when flaring, otherwise 3-4 times weekly  - prior: terbinafine cream     3. Stasis dermatitis    ____________________________________________    Assessment & Plan:     # Innumberable seborrheic keratoses.  None are bothersome to patient at today's visit. Benign nature discussed, patient reassured, no treatment indicated.     # Tinea pedis.  Overall much improved compared to last visit. Does have some white flaking of sole and heels. Recommend continued maintenance therapy with ketoconazole 2% cream 3-4 times weekly. If flaring can use this twice daily x 2 weeks, then back to maintenance.   - ketoconazole cream rx renewed     # Stasis dermatitis  Overall well-controlled.   Using compression stockings daily.     #Benign lesions: Multiple benign nevi, solar lentigos. Explained to patient benign nature of lesion. No treatment is necessary at this time unless the lesion changes or becomes symptomatic.     - ABCDs of melanoma were discussed and self skin checks were advised.  - Sun precaution was advised including the use of sun screens of SPF 30 or higher, sun protective clothing, and avoidance of tanning beds.    Follow-up: 1 year(s) in-person, or earlier for new or changing lesions    Staff and Resident:     Staffed with Dr. Vásquez.     Damian Starks MD, PhD  Med-Derm PGY-5    Staff Physician  Comments:   I saw and evaluated the patient with the resident and I agree with the assessment and plan.  I was present for the examination.    Matt Vásquez MD  Pronouns: he/him/his    Department of Dermatology  [facility]: Phone: [phone], Fax:[phone]  [facility]: Phone: [phone] Fax: [phone]    ____________________________________________    CC: RECHECK (pt states he is here for a follow up for a skin check)    HPI:  Mr. Noe Florence is a(n) 86 year old male who presents today for follow-up  for a skin check. He and his wife really have no concerns today other than mentioning that he has numerous seborrheic keratoses. None of them are bothersome to him at the moment. He is still using ketoconazole cream for feet a couple times a week.     Patient is otherwise feeling well, without additional skin concerns.    Labs Reviewed:  N/A    Physical Exam:  Vitals: There were no vitals taken for this visit.  SKIN:   Total skin excluding the undergarment areas was performed. The exam included the head/face, neck, both arms, chest, back, abdomen, both legs, digits and/or nails.   - Innumerable waxy stuck on appearing papules on the scalp, trunk and extremities  - Bilateral feet with erythema over entire foot, and only small amount of white scale on sole and heel  - erythema of bilateral lower legs, 1+ pitting edema from foot to knee  - Brown macules on sun exposed areas  - Brown macules and papules on trunk and extremities without concerning dermoscopy features  - No other lesions of concern on areas examined.     Medications:  Current Outpatient Medications   Medication     alendronate (FOSAMAX) 70 MG tablet     ARIPiprazole (ABILIFY) 2 MG tablet     atorvastatin (LIPITOR) 20 MG tablet     calcium carbonate 500 mg, elemental, (OSCAL;OYSTER SHELL CALCIUM) 500 MG tablet      cholecalciferol 1000 units TABS     COMPRESSION STOCKINGS     Cyanocobalamin (VITAMIN B-12 CR PO)     ferrous sulfate (FE TABS) 325 (65 Fe) MG EC tablet     ketoconazole (NIZORAL) 2 % external cream     metoprolol tartrate (LOPRESSOR) 25 MG tablet     mirtazapine (REMERON) 15 MG tablet     Multiple Vitamins-Minerals (MULTIVITAMIN ADULT PO)     sertraline (ZOLOFT) 100 MG tablet     tamsulosin (FLOMAX) 0.4 MG capsule     tiZANidine (ZANAFLEX) 2 MG tablet     warfarin ANTICOAGULANT (COUMADIN) 5 MG tablet     No current facility-administered medications for this visit.       Past Medical History:   Patient Active Problem List   Diagnosis     Rotator cuff (capsule) sprain     Other postprocedural status(V45.89)     Hyperkalemia     Anemia     Diarrhea     Diverticulitis of colon     Hypertension     CAD (coronary artery disease)     NSVT (nonsustained ventricular tachycardia) (H)     NSTEMI (non-ST elevated myocardial infarction) (H)     Automatic implantable cardioverter-defibrillator - Medtronic dual chamber ICD - Not Dependent     Skin exam, screening for cancer     Tinea cruris     Tinea corporis     Colon cancer (H)     Polymyalgia rheumatica (H)     S/P ablation of atrial flutter     Primary open angle glaucoma     Atrial fibrillation (H) [I48.91]     Long-term (current) use of anticoagulants [Z79.01]     Ischemic cardiomyopathy     CKD (chronic kidney disease), stage III     Weight loss, unintentional     Skin infection     Tinea pedis of both feet     Venous stasis dermatitis of both lower extremities     Actinic keratosis     Inflamed seborrheic keratosis     Closed left subtrochanteric femur fracture (H)     Hip fracture (H)     Other osteoporosis without current pathological fracture     Deep vein thrombosis (DVT) (H)     Other closed nondisplaced fracture of distal end of humerus, unspecified laterality, initial encounter     Syncope and collapse     Atrial fibrillation, unspecified type (H)     Past Medical  History:   Diagnosis Date     Advanced open-angle glaucoma      Atrial fibrillation (H)      CKD (chronic kidney disease), stage III 2005     Colon cancer (H)     Stage II-B colon cancer     Coronary artery disease     s/p CABG x 2, JEREMY x 2     Diverticulitis      Hyperlipidemia      Hypertension      Ischemic cardiomyopathy      MGUS (monoclonal gammopathy of unknown significance)      Nonsenile cataract     BE     Osteoporosis     left hip fracture     Polymorphic ventricular tachycardia (H)      Polymyalgia rheumatica (H)      PVD (posterior vitreous detachment), both eyes 2005     S/P ablation of atrial flutter 6/20/14    CTI     Stented coronary artery      SVT (supraventricular tachycardia) (H)     PPM/AICD for NSVT     Upper leg DVT (deep venous thromboembolism), chronic (H)     Left     Weight loss, unintentional 2017    15 lb in 4 months       CC Referred Self, MD  No address on file on close of this encounter.

## 2021-06-17 NOTE — PROGRESS NOTES
[facility] Dermatology Note  Encounter Date: Jun 17, 2021  Office Visit     Dermatology Problem List:  1. Innumerable seborrheic keratoses   - cryotherapy 10/27/30    2. History of tinea pedis (10/2020), corporis (8/2013, 2015), and cruris (8/2013)  - current: ketoconazole 2% cream BID to feet when flaring, otherwise 3-4 times weekly  - prior: terbinafine cream     3. Stasis dermatitis    ____________________________________________    Assessment & Plan:     # Innumberable seborrheic keratoses.  None are bothersome to patient at today's visit. Benign nature discussed, patient reassured, no treatment indicated.     # Tinea pedis.  Overall much improved compared to last visit. Does have some white flaking of sole and heels. Recommend continued maintenance therapy with ketoconazole 2% cream 3-4 times weekly. If flaring can use this twice daily x 2 weeks, then back to maintenance.   - ketoconazole cream rx renewed     # Stasis dermatitis  Overall well-controlled.   Using compression stockings daily.     #Benign lesions: Multiple benign nevi, solar lentigos. Explained to patient benign nature of lesion. No treatment is necessary at this time unless the lesion changes or becomes symptomatic.     - ABCDs of melanoma were discussed and self skin checks were advised.  - Sun precaution was advised including the use of sun screens of SPF 30 or higher, sun protective clothing, and avoidance of tanning beds.    Follow-up: 1 year(s) in-person, or earlier for new or changing lesions    Staff and Resident:     Staffed with Dr. Vásquez.     Damian Starks MD, PhD  Med-Derm PGY-5    Staff Physician Comments:   I saw and evaluated the patient with the resident and I agree with the assessment and plan.  I was present for the examination.    Matt Vásquez MD  Pronouns: he/him/his    Department of Dermatology  [facility]: Phone: [phone],  Fax:[phone]  [facility]: Phone: [phone] Fax: [phone]    ____________________________________________    CC: RECHECK (pt states he is here for a follow up for a skin check)    HPI:  Mr. Noe Florence is a(n) 86 year old male who presents today for follow-up  for a skin check. He and his wife really have no concerns today other than mentioning that he has numerous seborrheic keratoses. None of them are bothersome to him at the moment. He is still using ketoconazole cream for feet a couple times a week.     Patient is otherwise feeling well, without additional skin concerns.    Labs Reviewed:  N/A    Physical Exam:  Vitals: There were no vitals taken for this visit.  SKIN:   Total skin excluding the undergarment areas was performed. The exam included the head/face, neck, both arms, chest, back, abdomen, both legs, digits and/or nails.   - Innumerable waxy stuck on appearing papules on the scalp, trunk and extremities  - Bilateral feet with erythema over entire foot, and only small amount of white scale on sole and heel  - erythema of bilateral lower legs, 1+ pitting edema from foot to knee  - Brown macules on sun exposed areas  - Brown macules and papules on trunk and extremities without concerning dermoscopy features  - No other lesions of concern on areas examined.     Medications:  Current Outpatient Medications   Medication     alendronate (FOSAMAX) 70 MG tablet     ARIPiprazole (ABILIFY) 2 MG tablet     atorvastatin (LIPITOR) 20 MG tablet     calcium carbonate 500 mg, elemental, (OSCAL;OYSTER SHELL CALCIUM) 500 MG tablet     cholecalciferol 1000 units TABS     COMPRESSION STOCKINGS     Cyanocobalamin (VITAMIN B-12 CR PO)     ferrous sulfate (FE TABS) 325 (65 Fe) MG EC tablet     ketoconazole (NIZORAL) 2 % external cream     metoprolol tartrate (LOPRESSOR) 25 MG tablet     mirtazapine (REMERON) 15 MG tablet     Multiple Vitamins-Minerals  (MULTIVITAMIN ADULT PO)     sertraline (ZOLOFT) 100 MG tablet     tamsulosin (FLOMAX) 0.4 MG capsule     tiZANidine (ZANAFLEX) 2 MG tablet     warfarin ANTICOAGULANT (COUMADIN) 5 MG tablet     No current facility-administered medications for this visit.       Past Medical History:   Patient Active Problem List   Diagnosis     Rotator cuff (capsule) sprain     Other postprocedural status(V45.89)     Hyperkalemia     Anemia     Diarrhea     Diverticulitis of colon     Hypertension     CAD (coronary artery disease)     NSVT (nonsustained ventricular tachycardia) (H)     NSTEMI (non-ST elevated myocardial infarction) (H)     Automatic implantable cardioverter-defibrillator - Medtronic dual chamber ICD - Not Dependent     Skin exam, screening for cancer     Tinea cruris     Tinea corporis     Colon cancer (H)     Polymyalgia rheumatica (H)     S/P ablation of atrial flutter     Primary open angle glaucoma     Atrial fibrillation (H) [I48.91]     Long-term (current) use of anticoagulants [Z79.01]     Ischemic cardiomyopathy     CKD (chronic kidney disease), stage III     Weight loss, unintentional     Skin infection     Tinea pedis of both feet     Venous stasis dermatitis of both lower extremities     Actinic keratosis     Inflamed seborrheic keratosis     Closed left subtrochanteric femur fracture (H)     Hip fracture (H)     Other osteoporosis without current pathological fracture     Deep vein thrombosis (DVT) (H)     Other closed nondisplaced fracture of distal end of humerus, unspecified laterality, initial encounter     Syncope and collapse     Atrial fibrillation, unspecified type (H)     Past Medical History:   Diagnosis Date     Advanced open-angle glaucoma      Atrial fibrillation (H)      CKD (chronic kidney disease), stage III 2005     Colon cancer (H)     Stage II-B colon cancer     Coronary artery disease     s/p CABG x 2, JEREMY x 2     Diverticulitis      Hyperlipidemia      Hypertension      Ischemic  cardiomyopathy      MGUS (monoclonal gammopathy of unknown significance)      Nonsenile cataract     BE     Osteoporosis     left hip fracture     Polymorphic ventricular tachycardia (H)      Polymyalgia rheumatica (H)      PVD (posterior vitreous detachment), both eyes 2005     S/P ablation of atrial flutter 6/20/14    CTI     Stented coronary artery      SVT (supraventricular tachycardia) (H)     PPM/AICD for NSVT     Upper leg DVT (deep venous thromboembolism), chronic (H)     Left     Weight loss, unintentional 2017    15 lb in 4 months       CC Referred Self, MD  No address on file on close of this encounter.

## 2021-07-02 NOTE — TELEPHONE ENCOUNTER
RECORDS RECEIVED FROM:    DATE RECEIVED: Aug 4, 2021     NOTES STATUS DETAILS   OFFICE NOTE from referring provider Internal    OFFICE NOTE from other specialist N/A    DISCHARGE SUMMARY from hospital N/A    DISCHARGE REPORT from the ER N/A    OPERATIVE REPORT N/A    MEDICATION LIST Internal    IMPLANT RECORD/STICKER N/A    LABS     CBC/DIFF N/A    CULTURES N/A    INJECTIONS DONE IN RADIOLOGY N/A    MRI N/A    CT SCAN N/A    XRAYS (IMAGES & REPORTS) N/A    TUMOR     PATHOLOGY  Slides & report N/A      07/02/21   1:33 PM   COMPLETE  Alejandrina Liu, CMA

## 2021-07-05 ENCOUNTER — ANTICOAGULATION THERAPY VISIT (OUTPATIENT)
Dept: ANTICOAGULATION | Facility: CLINIC | Age: 86
End: 2021-07-05

## 2021-07-05 ENCOUNTER — THERAPY VISIT (OUTPATIENT)
Dept: PHYSICAL THERAPY | Facility: CLINIC | Age: 86
End: 2021-07-05
Payer: COMMERCIAL

## 2021-07-05 DIAGNOSIS — I48.91 ATRIAL FIBRILLATION, UNSPECIFIED TYPE (H): ICD-10-CM

## 2021-07-05 DIAGNOSIS — Z79.01 LONG TERM CURRENT USE OF ANTICOAGULANT THERAPY: ICD-10-CM

## 2021-07-05 DIAGNOSIS — I82.409 DEEP VEIN THROMBOSIS (DVT) (H): ICD-10-CM

## 2021-07-05 DIAGNOSIS — R53.81 PHYSICAL DECONDITIONING: Primary | ICD-10-CM

## 2021-07-05 LAB — INR PPP: 2.31 (ref 0.86–1.14)

## 2021-07-05 PROCEDURE — 97110 THERAPEUTIC EXERCISES: CPT | Mod: GP | Performed by: PHYSICAL THERAPIST

## 2021-07-05 PROCEDURE — 85610 PROTHROMBIN TIME: CPT | Performed by: PATHOLOGY

## 2021-07-05 PROCEDURE — 36416 COLLJ CAPILLARY BLOOD SPEC: CPT | Performed by: PATHOLOGY

## 2021-07-05 NOTE — DISCHARGE INSTRUCTIONS
1. Exercises daily: ankle strengthening and hamstring stretch  2. Aerobic exercise: use your walker at the clinic two times a week. If your feet are bothering you, then do the floor bike for 20 minutes a day  3. Continue stairs and sit to stands to maintain leg strength  4. Recommend transport wheelchair to use for long distances / community events    Anai Montero PT, DPT  Physical Therapist  [facility] [address]  [address] [place] [address]  [email or web address]  Appointments: [phone]

## 2021-07-05 NOTE — PROGRESS NOTES
"Outpatient Physical Therapy Discharge Note     Patient: Noe Florence  : 1935    Beginning/End Dates of Reporting Period:  2020 to 2021    Referring Provider: Roberto Sarmiento MD    Therapy Diagnosis: Deconditioning     Client Self Report: Patient reports varying levels of activity due to compounding variables, but reports overall improved activity tolerance and ambulation with walker and straight cane (with assist).  Patient still looking into caregiver support to increase compliance with exercise program to maintain conditioning status    Objective Measurements:        Objective Measure: 30 second sit to stand test  Details: 4 reps with LUE 20\" high chair        Objective Measure: gait with SEC  Details: 0.5 m/s      Goals:  Goal Identifier TUG   Goal Description Patient will perform the TUG with straight point cane in less than 30 seconds to decrease fall risk and improve functional mobility    Target Date 21   Date Met  21   Progress:     Goal Identifier 30 second sit to stand test   Goal Description Patient will complete at least 5 repetitions on the 30 second sit to stand test for improved gait stability   Target Date 21   Date Met      Progress: Limited progress due to multiple orthopedic variables limiting sit to stand independence without upper extremity support     Goal Identifier Muscle tissue length   Goal Description Patient will demonstrate improvement of at least 5 degrees in bilateral hamstring and gastroc length for improved posture and gait stability   Target Date 21   Date Met      Progress: Patient demonstrates improved right lower extremity tissue mobility and hamstrings and gastrocs, continued significant restrictions on left     Goal Identifier HEP   Goal Description Patient will be independent home exercise program for maintenance of therapy gains at discharge and improved independence with self exercise performance at home   Target Date 21 "   Date Met  07/05/21   Progress:       Progress Toward Goals:   Progress this reporting period: Patient demonstrates significant improvement from initial evaluation as demonstrated by improvement on the TUG with SEC from 42 seconds to <30 seconds.  Patient's current gait speed on discharge is 0.5 m/s with straight cane; limited community ambulator status.  PT continues to recommend WW for majority of mobility due to modified independent status.  Requested patient look into transport wheelchair for longer community distances to prevent excessive fatigue.  Patient reports hearing loss of activity due to compounding factors including wounds on both feet (has follow-up August).  Recommended patient modify activities including use of floor bike or nonweightbearing strengthening exercises to maintain conditioning status even when unable to ambulate.  Patient with longstanding difficulty with exercise compliance; looking into caregiver assist    Plan:  Discharge from therapy.    Discharge:    Reason for Discharge: No further expectation of progress.  Patient independent home exercise program and would benefit from caregiver assist to increase compliance for longstanding effects    Equipment Issued: L AFO, extra wheels for WW, recommended transport wheelchair for community distances    Discharge Plan: Patient to continue home program.

## 2021-07-05 NOTE — PROGRESS NOTES
ANTICOAGULATION MANAGEMENT     Noe Florence 86 year old male is on warfarin with therapeutic INR result. (Goal INR 1.5-2.5)    Recent labs: (last 7 days)     07/05/21  1552   INR 2.31*       ASSESSMENT     Source(s): Chart Review       Warfarin doses taken: Warfarin taken as instructed and Reviewed in chart    Diet: No new diet changes identified    New illness, injury, or hospitalization: No    Medication/supplement changes: None noted    Signs or symptoms of bleeding or clotting: No    Previous INR: Therapeutic last 2(+) visits    Additional findings: None     PLAN     Recommended plan for no diet, medication or health factor changes affecting INR     Dosing Instructions: Continue your current warfarin dose with next INR in 4 weeks       Summary  As of 7/5/2021    Full warfarin instructions:  7.5 mg every Wed; 5 mg all other days   Next INR check:  8/2/2021             Unable to leave a message.     Contact [phone] to schedule and with any changes, questions or concerns.     Education provided: Please call back if any changes to your diet, medications or how you've been taking warfarin and Contact [phone] with any changes, questions or concerns.     Plan made per [facility] anticoagulation protocol    Roya Manning RN  Anticoagulation Clinic  7/5/2021    _______________________________________________________________________     Anticoagulation Episode Summary     Current INR goal:  1.5-2.5   TTR:  94.8 % (1 y)   Target end date:  Indefinite   Send INR reminders to:  [facility]    Indications    Atrial fibrillation (H) [I48.91] [I48.91]  Long-term (current) use of anticoagulants [Z79.01] [Z79.01]  Deep vein thrombosis (DVT) (H) [I82.409]  Atrial fibrillation  unspecified type (H) [I48.91]           Comments:  Patient contact number: [phone] Hx of Falls/Bleed  Can leave results with Rica (friend)  Restarted Warfarin due to DVT.          Anticoagulation Care Providers     Provider Role  Specialty Phone number    Frida Desai MD Referring Cardiovascular Disease [phone]

## 2021-07-07 ENCOUNTER — TELEPHONE (OUTPATIENT)
Dept: ANTICOAGULATION | Facility: CLINIC | Age: 86
End: 2021-07-07

## 2021-07-07 NOTE — TELEPHONE ENCOUNTER
Rica called to let us know that pt will be having dental surgery on 8/16, and the dentist would like his INR to be on the low end of his goal range of 1.5-2.5. Rica will have his coumadin dose go down to 5mg daily a couple weeks prior to the surgery. Pt will have an INR done on 8/4.

## 2021-08-04 ENCOUNTER — PRE VISIT (OUTPATIENT)
Dept: ORTHOPEDICS | Facility: CLINIC | Age: 86
End: 2021-08-04

## 2021-08-05 ENCOUNTER — LAB (OUTPATIENT)
Dept: LAB | Facility: CLINIC | Age: 86
End: 2021-08-05
Attending: INTERNAL MEDICINE
Payer: COMMERCIAL

## 2021-08-05 ENCOUNTER — ANTICOAGULATION THERAPY VISIT (OUTPATIENT)
Dept: ANTICOAGULATION | Facility: CLINIC | Age: 86
End: 2021-08-05

## 2021-08-05 DIAGNOSIS — I82.409 DEEP VEIN THROMBOSIS (DVT) (H): ICD-10-CM

## 2021-08-05 DIAGNOSIS — Z79.01 LONG TERM CURRENT USE OF ANTICOAGULANT THERAPY: ICD-10-CM

## 2021-08-05 DIAGNOSIS — I48.91 ATRIAL FIBRILLATION, UNSPECIFIED TYPE (H): ICD-10-CM

## 2021-08-05 DIAGNOSIS — I48.91 ATRIAL FIBRILLATION (H): Primary | ICD-10-CM

## 2021-08-05 LAB — INR PPP: 2.1 (ref 0.85–1.15)

## 2021-08-05 PROCEDURE — 85610 PROTHROMBIN TIME: CPT | Performed by: PATHOLOGY

## 2021-08-05 PROCEDURE — 36415 COLL VENOUS BLD VENIPUNCTURE: CPT | Performed by: PATHOLOGY

## 2021-08-05 NOTE — PROGRESS NOTES
ANTICOAGULATION MANAGEMENT     Noe Florence 86 year old male is on warfarin with therapeutic INR result. (Goal INR 1.5-2.5)    Recent labs: (last 7 days)     08/05/21  1624   INR 2.10*       ASSESSMENT     Source(s): Chart Review and Patient/Caregiver Call       Warfarin doses taken: Warfarin taken as instructed    Diet: No new diet changes identified    New illness, injury, or hospitalization: No    Medication/supplement changes: None noted    Signs or symptoms of bleeding or clotting: No    Previous INR: Therapeutic last 2(+) visits    Additional findings: Upcoming surgery/procedure Patient is having dental surgery on 8/16 and they would like INR closest to the bottom of the goal range.     PLAN     Recommended plan for no diet, medication or health factor changes affecting INR     Dosing Instructions: Continue your current warfarin dose with next INR in 10 days       Summary  As of 8/5/2021    Full warfarin instructions:  7.5 mg every Wed; 5 mg all other days   Next INR check:               Telephone call with  Rica who verbalizes understanding and agrees to plan and who agrees to plan and repeated back plan correctly    Lab visit scheduled    Education provided: None required    Plan made per [facility] anticoagulation protocol    Roya Manning, RN  Anticoagulation Clinic  8/5/2021    _______________________________________________________________________     Anticoagulation Episode Summary     Current INR goal:  1.5-2.5   TTR:  95.1 % (1 y)   Target end date:  Indefinite   Send INR reminders to:  [facility]    Indications    Atrial fibrillation (H) [I48.91] [I48.91]  Long-term (current) use of anticoagulants [Z79.01] [Z79.01]  Deep vein thrombosis (DVT) (H) [I82.409]  Atrial fibrillation  unspecified type (H) [I48.91]           Comments:  Patient contact number: [phone] Hx of Falls/Bleed  Can leave results with Rica (friend)  Restarted Warfarin due to DVT.          Anticoagulation Care  Providers     Provider Role Specialty Phone number    Frida Desai MD Referring Cardiovascular Disease [phone]

## 2021-08-13 ENCOUNTER — ANTICOAGULATION THERAPY VISIT (OUTPATIENT)
Dept: ANTICOAGULATION | Facility: CLINIC | Age: 86
End: 2021-08-13

## 2021-08-13 ENCOUNTER — LAB (OUTPATIENT)
Dept: LAB | Facility: CLINIC | Age: 86
End: 2021-08-13
Payer: COMMERCIAL

## 2021-08-13 DIAGNOSIS — Z79.01 LONG TERM CURRENT USE OF ANTICOAGULANT THERAPY: ICD-10-CM

## 2021-08-13 DIAGNOSIS — I48.91 ATRIAL FIBRILLATION, UNSPECIFIED TYPE (H): ICD-10-CM

## 2021-08-13 DIAGNOSIS — I82.409 DEEP VEIN THROMBOSIS (DVT) (H): ICD-10-CM

## 2021-08-13 DIAGNOSIS — I48.91 ATRIAL FIBRILLATION (H): Primary | ICD-10-CM

## 2021-08-13 LAB — INR PPP: 2.19 (ref 0.85–1.15)

## 2021-08-13 PROCEDURE — 85610 PROTHROMBIN TIME: CPT | Performed by: PATHOLOGY

## 2021-08-13 PROCEDURE — 36415 COLL VENOUS BLD VENIPUNCTURE: CPT | Performed by: PATHOLOGY

## 2021-08-13 NOTE — PROGRESS NOTES
ANTICOAGULATION MANAGEMENT     Noe Florence 86 year old male is on warfarin with therapeutic INR result. (Goal INR 1.5-2.5)    Recent labs: (last 7 days)     08/13/21  1432   INR 2.19*       ASSESSMENT     Source(s): Chart Review and Patient/Caregiver Call       Warfarin doses taken: Warfarin taken as instructed    Diet: No new diet changes identified    New illness, injury, or hospitalization: Yes: 8/16 dental procedure-pt is reducing dose for this    Medication/supplement changes: None noted    Signs or symptoms of bleeding or clotting: No    Previous INR: Therapeutic last 2(+) visits    Additional findings: None     PLAN     Recommended plan for no diet, medication or health factor changes affecting INR     Dosing Instructions:  Decrease your warfarin dose (-See calendar% change) with next INR in 3 days       Summary  As of 8/13/2021    Full warfarin instructions:  8/13: 2.5 mg; 8/14: 2.5 mg; 8/15: 2.5 mg; Otherwise 7.5 mg every Wed; 5 mg all other days   Next INR check:  8/16/2021             Telephone call with Noe who verbalizes understanding and agrees to plan    Patient elected to schedule next visit 8/16    Education provided: upcoming dental procedure    Plan made per [facility] anticoagulation protocol    Leena Abebe RN  Anticoagulation Clinic  8/13/2021    _______________________________________________________________________     Anticoagulation Episode Summary     Current INR goal:  1.5-2.5   TTR:  95.1 % (1 y)   Target end date:  Indefinite   Send INR reminders to:  [facility]    Indications    Atrial fibrillation (H) [I48.91] [I48.91]  Long-term (current) use of anticoagulants [Z79.01] [Z79.01]  Deep vein thrombosis (DVT) (H) [I82.409]  Atrial fibrillation  unspecified type (H) [I48.91]           Comments:  Patient contact number: [phone] Hx of Falls/Bleed  Can leave results with Rica (friend)  Restarted Warfarin due to DVT.          Anticoagulation Care Providers     Provider  Role Specialty Phone number    Frida Desai MD Referring Cardiovascular Disease [phone]

## 2021-08-16 LAB — INR (EXTERNAL): 1.5 (ref 0.9–1.1)

## 2021-08-17 ENCOUNTER — ANCILLARY PROCEDURE (OUTPATIENT)
Dept: CARDIOLOGY | Facility: CLINIC | Age: 86
End: 2021-08-17
Attending: INTERNAL MEDICINE
Payer: COMMERCIAL

## 2021-08-17 ENCOUNTER — ANTICOAGULATION THERAPY VISIT (OUTPATIENT)
Dept: ANTICOAGULATION | Facility: CLINIC | Age: 86
End: 2021-08-17

## 2021-08-17 DIAGNOSIS — I82.409 DEEP VEIN THROMBOSIS (DVT) (H): ICD-10-CM

## 2021-08-17 DIAGNOSIS — Z95.810 ICD (IMPLANTABLE CARDIOVERTER-DEFIBRILLATOR) IN PLACE: ICD-10-CM

## 2021-08-17 DIAGNOSIS — I48.91 ATRIAL FIBRILLATION, UNSPECIFIED TYPE (H): ICD-10-CM

## 2021-08-17 DIAGNOSIS — Z79.01 LONG TERM CURRENT USE OF ANTICOAGULANT THERAPY: ICD-10-CM

## 2021-08-17 DIAGNOSIS — I47.20 VENTRICULAR TACHYARRHYTHMIA (H): ICD-10-CM

## 2021-08-17 DIAGNOSIS — I48.0 PAROXYSMAL ATRIAL FIBRILLATION (H): ICD-10-CM

## 2021-08-17 DIAGNOSIS — I48.91 ATRIAL FIBRILLATION (H): Primary | ICD-10-CM

## 2021-08-17 PROCEDURE — 93295 DEV INTERROG REMOTE 1/2/MLT: CPT | Performed by: INTERNAL MEDICINE

## 2021-08-17 PROCEDURE — 93296 REM INTERROG EVL PM/IDS: CPT

## 2021-08-17 NOTE — PROGRESS NOTES
ANTICOAGULATION MANAGEMENT     Noe Florence 86 year old male is on warfarin with therapeutic INR result. (Goal INR 1.5-2.5)    Recent labs: (last 7 days)     08/16/21  0000   INR 1.5*       ASSESSMENT     Source(s): Patient/Caregiver Call       Warfarin doses taken: Warfarin taken as instructed    Diet: Pt is going to start eating soft foods and previously was eating a lot of vitamin k rich foods, but will not be eating this for a while.    New illness, injury, or hospitalization: Pt had his tooth extracted yesterday and per Rica started previous maintenance dose last night.    Medication/supplement changes: None noted    Signs or symptoms of bleeding or clotting: No    Previous INR: Therapeutic last 2(+) visits    Additional findings: None     PLAN     Recommended plan for no diet, medication or health factor changes affecting INR     Dosing Instructions: Continue your current warfarin dose with next INR in 1 week       Summary  As of 8/17/2021    Full warfarin instructions:  7.5 mg every Wed; 5 mg all other days   Next INR check:               Telephone call with  Friend Rica who verbalizes understanding and agrees to plan and who agrees to plan and repeated back plan correctly    Lab visit scheduled    Education provided: None required    Plan made with [facility] Pharmacist Maddy Zuniga, [place]  Anticoagulation Clinic  8/17/2021    _______________________________________________________________________     Anticoagulation Episode Summary     Current INR goal:  1.5-2.5   TTR:  95.1 % (1 y)   Target end date:  Indefinite   Send INR reminders to:  [facility]    Indications    Atrial fibrillation (H) [I48.91] [I48.91]  Long-term (current) use of anticoagulants [Z79.01] [Z79.01]  Deep vein thrombosis (DVT) (H) [I82.409]  Atrial fibrillation  unspecified type (H) [I48.91]           Comments:  Patient contact number: [phone] Hx of Falls/Bleed  Can leave results with Rica  (friend)  Restarted Warfarin due to DVT.          Anticoagulation Care Providers     Provider Role Specialty Phone number    Frida Desai MD Referring Cardiovascular Disease [phone]

## 2021-08-23 ENCOUNTER — LAB (OUTPATIENT)
Dept: LAB | Facility: CLINIC | Age: 86
End: 2021-08-23
Payer: COMMERCIAL

## 2021-08-23 ENCOUNTER — ANTICOAGULATION THERAPY VISIT (OUTPATIENT)
Dept: ANTICOAGULATION | Facility: CLINIC | Age: 86
End: 2021-08-23

## 2021-08-23 DIAGNOSIS — Z79.01 LONG TERM CURRENT USE OF ANTICOAGULANT THERAPY: ICD-10-CM

## 2021-08-23 DIAGNOSIS — I48.91 ATRIAL FIBRILLATION, UNSPECIFIED TYPE (H): ICD-10-CM

## 2021-08-23 DIAGNOSIS — Z79.01 LONG TERM CURRENT USE OF ANTICOAGULANT THERAPY: Primary | ICD-10-CM

## 2021-08-23 DIAGNOSIS — I82.409 DEEP VEIN THROMBOSIS (DVT) (H): ICD-10-CM

## 2021-08-23 LAB — INR PPP: 1.85 (ref 0.85–1.15)

## 2021-08-23 PROCEDURE — 85610 PROTHROMBIN TIME: CPT | Performed by: PATHOLOGY

## 2021-08-23 PROCEDURE — 36415 COLL VENOUS BLD VENIPUNCTURE: CPT | Performed by: PATHOLOGY

## 2021-08-23 NOTE — PROGRESS NOTES
ANTICOAGULATION MANAGEMENT     Noe Florence 86 year old male is on warfarin with therapeutic INR result. (Goal INR 1.5-2.5)    Recent labs: (last 7 days)     08/23/21  1611   INR 1.85*       ASSESSMENT     Source(s): Chart Review and Patient/Caregiver Call       Warfarin doses taken: Warfarin taken as instructed    Diet: No new diet changes identified    New illness, injury, or hospitalization: No    Medication/supplement changes: None noted    Signs or symptoms of bleeding or clotting: No    Previous INR: Therapeutic last 2(+) visits    Additional findings: None     PLAN     Recommended plan for no diet, medication or health factor changes affecting INR     Dosing Instructions: Continue your current warfarin dose with next INR in 2 weeks       Summary  As of 8/23/2021    Full warfarin instructions:  7.5 mg every Wed; 5 mg all other days   Next INR check:               Telephone call with  stanislaw who verbalizes understanding and agrees to plan    Patient elected to schedule next visit 9/7    Education provided: None required    Plan made per [facility] anticoagulation protocol    Leena Abebe RN  Anticoagulation Clinic  8/23/2021    _______________________________________________________________________     Anticoagulation Episode Summary     Current INR goal:  1.5-2.5   TTR:  95.1 % (1 y)   Target end date:  Indefinite   Send INR reminders to:  [facility]    Indications    Atrial fibrillation (H) [I48.91] [I48.91]  Long-term (current) use of anticoagulants [Z79.01] [Z79.01]  Deep vein thrombosis (DVT) (H) [I82.409]  Atrial fibrillation  unspecified type (H) [I48.91]           Comments:  Patient contact number: [phone] Hx of Falls/Bleed  Can leave results with Rica (friend)  Restarted Warfarin due to DVT.          Anticoagulation Care Providers     Provider Role Specialty Phone number    Frida Desai MD Referring Cardiovascular Disease [phone]

## 2021-09-05 ENCOUNTER — HEALTH MAINTENANCE LETTER (OUTPATIENT)
Age: 86
End: 2021-09-05

## 2021-09-07 ENCOUNTER — TELEPHONE (OUTPATIENT)
Dept: ANTICOAGULATION | Facility: CLINIC | Age: 86
End: 2021-09-07

## 2021-09-07 DIAGNOSIS — I82.409 DEEP VEIN THROMBOSIS (DVT) (H): ICD-10-CM

## 2021-09-07 DIAGNOSIS — I48.91 ATRIAL FIBRILLATION (H): Primary | ICD-10-CM

## 2021-09-07 DIAGNOSIS — I48.91 ATRIAL FIBRILLATION, UNSPECIFIED TYPE (H): ICD-10-CM

## 2021-09-07 DIAGNOSIS — Z79.01 LONG TERM CURRENT USE OF ANTICOAGULANT THERAPY: ICD-10-CM

## 2021-09-07 NOTE — TELEPHONE ENCOUNTER
Friend Rica called reporting pt missed a 5mg dose on Saturday 9/4. Writer instructed Rica to have pt take a 7.5mg dose tonight and then continue with maintenance dose 7.5mg W and 5mg ROW. Pt is planning on getting an INR later this week. Updated the calendar.

## 2021-09-09 ENCOUNTER — ANTICOAGULATION THERAPY VISIT (OUTPATIENT)
Dept: ANTICOAGULATION | Facility: CLINIC | Age: 86
End: 2021-09-09

## 2021-09-09 ENCOUNTER — LAB (OUTPATIENT)
Dept: LAB | Facility: CLINIC | Age: 86
End: 2021-09-09
Payer: COMMERCIAL

## 2021-09-09 DIAGNOSIS — I48.91 ATRIAL FIBRILLATION (H): Primary | ICD-10-CM

## 2021-09-09 DIAGNOSIS — I82.409 DEEP VEIN THROMBOSIS (DVT) (H): ICD-10-CM

## 2021-09-09 DIAGNOSIS — I48.91 ATRIAL FIBRILLATION, UNSPECIFIED TYPE (H): ICD-10-CM

## 2021-09-09 DIAGNOSIS — Z79.01 LONG TERM CURRENT USE OF ANTICOAGULANT THERAPY: ICD-10-CM

## 2021-09-09 LAB — INR PPP: 2.41 (ref 0.85–1.15)

## 2021-09-09 PROCEDURE — 36415 COLL VENOUS BLD VENIPUNCTURE: CPT | Performed by: PATHOLOGY

## 2021-09-09 PROCEDURE — 85610 PROTHROMBIN TIME: CPT | Performed by: PATHOLOGY

## 2021-09-09 NOTE — PROGRESS NOTES
ANTICOAGULATION MANAGEMENT     Noe Florence 86 year old male is on warfarin with therapeutic INR result. (Goal INR 1.5-2.5)    Recent labs: (last 7 days)     09/09/21  1610   INR 2.41*       ASSESSMENT     Source(s): Chart Review and Patient/Caregiver Call       Warfarin doses taken: Missed dose(s) may be affecting INR and per Rica she gave Bill a larger dose on Tuesday to make up for it.    Diet: No new diet changes identified    New illness, injury, or hospitalization: No    Medication/supplement changes: None noted    Signs or symptoms of bleeding or clotting: No    Previous INR: Therapeutic last 2(+) visits    Additional findings: None     PLAN     Recommended plan for temporary change(s) affecting INR     Dosing Instructions: Continue your current warfarin dose with next INR in 3 weeks       Summary  As of 9/9/2021    Full warfarin instructions:  7.5 mg every Wed; 5 mg all other days   Next INR check:  9/30/2021             Telephone call with  Rica who agrees to plan and repeated back plan correctly    Patient offered & declined to schedule next visit    Education provided: Please call back if any changes to your diet, medications or how you've been taking warfarin, Importance of notifying clinic for changes in medications; a sooner lab recheck maybe needed. and Importance of notifying clinic for diarrhea, nausea/vomiting, reduced intake, and/or illness; a sooner lab recheck maybe needed.    Plan made per [facility] anticoagulation protocol    Ciro Bajwa, RN  Anticoagulation Clinic  9/9/2021    _______________________________________________________________________     Anticoagulation Episode Summary     Current INR goal:  1.5-2.5   TTR:  95.1 % (1 y)   Target end date:  Indefinite   Send INR reminders to:  [facility]    Indications    Atrial fibrillation (H) [I48.91] [I48.91]  Long-term (current) use of anticoagulants [Z79.01] [Z79.01]  Deep vein thrombosis (DVT) (H) [I82.409]  Atrial  fibrillation  unspecified type (H) [I48.91]           Comments:  Patient contact number: [phone] Hx of Falls/Bleed  Can leave results with Rica (friend)  Restarted Warfarin due to DVT.          Anticoagulation Care Providers     Provider Role Specialty Phone number    Frida Desai MD Referring Cardiovascular Disease [phone]

## 2021-09-14 DIAGNOSIS — C18.9 MALIGNANT NEOPLASM OF COLON, UNSPECIFIED PART OF COLON (H): Primary | ICD-10-CM

## 2021-09-18 ENCOUNTER — ANCILLARY PROCEDURE (OUTPATIENT)
Dept: CT IMAGING | Facility: CLINIC | Age: 86
End: 2021-09-18
Attending: INTERNAL MEDICINE
Payer: COMMERCIAL

## 2021-09-18 DIAGNOSIS — C18.9 MALIGNANT NEOPLASM OF COLON, UNSPECIFIED PART OF COLON (H): ICD-10-CM

## 2021-09-18 LAB
CREAT BLD-MCNC: 1.9 MG/DL (ref 0.7–1.3)
GFR SERPL CREATININE-BSD FRML MDRD: 31 ML/MIN/1.73M2

## 2021-09-18 PROCEDURE — 74177 CT ABD & PELVIS W/CONTRAST: CPT | Performed by: RADIOLOGY

## 2021-09-18 PROCEDURE — 71260 CT THORAX DX C+: CPT | Performed by: RADIOLOGY

## 2021-09-18 RX ORDER — IOPAMIDOL 755 MG/ML
95 INJECTION, SOLUTION INTRAVASCULAR ONCE
Status: COMPLETED | OUTPATIENT
Start: 2021-09-18 | End: 2021-09-18

## 2021-09-18 RX ADMIN — IOPAMIDOL 95 ML: 755 INJECTION, SOLUTION INTRAVASCULAR at 14:46

## 2021-09-20 ENCOUNTER — LAB (OUTPATIENT)
Dept: LAB | Facility: CLINIC | Age: 86
End: 2021-09-20
Payer: COMMERCIAL

## 2021-09-20 ENCOUNTER — ANTICOAGULATION THERAPY VISIT (OUTPATIENT)
Dept: ANTICOAGULATION | Facility: CLINIC | Age: 86
End: 2021-09-20

## 2021-09-20 DIAGNOSIS — I48.91 ATRIAL FIBRILLATION, UNSPECIFIED TYPE (H): ICD-10-CM

## 2021-09-20 DIAGNOSIS — Z79.01 LONG TERM CURRENT USE OF ANTICOAGULANT THERAPY: ICD-10-CM

## 2021-09-20 DIAGNOSIS — C18.9 MALIGNANT NEOPLASM OF COLON, UNSPECIFIED PART OF COLON (H): ICD-10-CM

## 2021-09-20 DIAGNOSIS — I48.91 ATRIAL FIBRILLATION (H): Primary | ICD-10-CM

## 2021-09-20 DIAGNOSIS — I82.409 DEEP VEIN THROMBOSIS (DVT) (H): ICD-10-CM

## 2021-09-20 LAB
ALBUMIN SERPL-MCNC: 3.5 G/DL (ref 3.4–5)
ALP SERPL-CCNC: 107 U/L (ref 40–150)
ALT SERPL W P-5'-P-CCNC: 23 U/L (ref 0–70)
ANION GAP SERPL CALCULATED.3IONS-SCNC: 5 MMOL/L (ref 3–14)
AST SERPL W P-5'-P-CCNC: 18 U/L (ref 0–45)
BASOPHILS # BLD AUTO: 0 10E3/UL (ref 0–0.2)
BASOPHILS NFR BLD AUTO: 0 %
BILIRUB SERPL-MCNC: 0.3 MG/DL (ref 0.2–1.3)
BUN SERPL-MCNC: 56 MG/DL (ref 7–30)
CALCIUM SERPL-MCNC: 9.1 MG/DL (ref 8.5–10.1)
CEA SERPL-MCNC: 4.7 UG/L (ref 0–2.5)
CHLORIDE BLD-SCNC: 113 MMOL/L (ref 94–109)
CO2 SERPL-SCNC: 26 MMOL/L (ref 20–32)
CREAT SERPL-MCNC: 1.77 MG/DL (ref 0.66–1.25)
EOSINOPHIL # BLD AUTO: 0.1 10E3/UL (ref 0–0.7)
EOSINOPHIL NFR BLD AUTO: 2 %
ERYTHROCYTE [DISTWIDTH] IN BLOOD BY AUTOMATED COUNT: 14.6 % (ref 10–15)
GFR SERPL CREATININE-BSD FRML MDRD: 34 ML/MIN/1.73M2
GLUCOSE BLD-MCNC: 78 MG/DL (ref 70–99)
HCT VFR BLD AUTO: 34.1 % (ref 40–53)
HGB BLD-MCNC: 11.1 G/DL (ref 13.3–17.7)
IMM GRANULOCYTES # BLD: 0 10E3/UL
IMM GRANULOCYTES NFR BLD: 0 %
INR PPP: 2.37 (ref 0.85–1.15)
LYMPHOCYTES # BLD AUTO: 0.7 10E3/UL (ref 0.8–5.3)
LYMPHOCYTES NFR BLD AUTO: 11 %
MCH RBC QN AUTO: 30.7 PG (ref 26.5–33)
MCHC RBC AUTO-ENTMCNC: 32.6 G/DL (ref 31.5–36.5)
MCV RBC AUTO: 95 FL (ref 78–100)
MONOCYTES # BLD AUTO: 0.6 10E3/UL (ref 0–1.3)
MONOCYTES NFR BLD AUTO: 9 %
NEUTROPHILS # BLD AUTO: 4.8 10E3/UL (ref 1.6–8.3)
NEUTROPHILS NFR BLD AUTO: 78 %
NRBC # BLD AUTO: 0 10E3/UL
NRBC BLD AUTO-RTO: 0 /100
PLATELET # BLD AUTO: 155 10E3/UL (ref 150–450)
POTASSIUM BLD-SCNC: 4.8 MMOL/L (ref 3.4–5.3)
PROT SERPL-MCNC: 7.6 G/DL (ref 6.8–8.8)
RBC # BLD AUTO: 3.61 10E6/UL (ref 4.4–5.9)
SODIUM SERPL-SCNC: 144 MMOL/L (ref 133–144)
WBC # BLD AUTO: 6.2 10E3/UL (ref 4–11)

## 2021-09-20 PROCEDURE — 85610 PROTHROMBIN TIME: CPT | Performed by: PATHOLOGY

## 2021-09-20 PROCEDURE — 36415 COLL VENOUS BLD VENIPUNCTURE: CPT | Performed by: PATHOLOGY

## 2021-09-20 PROCEDURE — 82378 CARCINOEMBRYONIC ANTIGEN: CPT | Mod: 90 | Performed by: PATHOLOGY

## 2021-09-20 PROCEDURE — 85025 COMPLETE CBC W/AUTO DIFF WBC: CPT | Performed by: PATHOLOGY

## 2021-09-20 PROCEDURE — 80053 COMPREHEN METABOLIC PANEL: CPT | Performed by: PATHOLOGY

## 2021-09-20 NOTE — PROGRESS NOTES
ANTICOAGULATION MANAGEMENT     Noe Florence 86 year old male is on warfarin with therapeutic INR result. (Goal INR 1.5-2.5)    Recent labs: (last 7 days)     09/20/21  1545   INR 2.37*       ASSESSMENT     Source(s): Chart Review and Patient/Caregiver Call       Warfarin doses taken: Warfarin taken as instructed    Diet: No new diet changes identified    New illness, injury, or hospitalization: No    Medication/supplement changes: None noted    Signs or symptoms of bleeding or clotting: No    Previous INR: Therapeutic last 2(+) visits    Additional findings: None     PLAN     Recommended plan for no diet, medication or health factor changes affecting INR     Dosing Instructions: Continue your current warfarin dose with next INR in 4 weeks       Summary  As of 9/20/2021    Full warfarin instructions:  7.5 mg every Wed; 5 mg all other days   Next INR check:  10/11/2021             Telephone call with Rica who verbalizes understanding and agrees to plan    Patient offered & declined to schedule next visit    Education provided: None required    Plan made per [facility] anticoagulation protocol    Frida Chinchilla RN  Anticoagulation Clinic  9/20/2021    _______________________________________________________________________     Anticoagulation Episode Summary     Current INR goal:  1.5-2.5   TTR:  96.3 % (1 y)   Target end date:  Indefinite   Send INR reminders to:  [facility]    Indications    Atrial fibrillation (H) [I48.91] [I48.91]  Long-term (current) use of anticoagulants [Z79.01] [Z79.01]  Deep vein thrombosis (DVT) (H) [I82.409]  Atrial fibrillation  unspecified type (H) [I48.91]           Comments:  Patient contact number: [phone] Hx of Falls/Bleed  Can leave results with Rica (friend)  Restarted Warfarin due to DVT.          Anticoagulation Care Providers     Provider Role Specialty Phone number    Frida Desai MD Referring Cardiovascular Disease [phone]

## 2021-09-21 ENCOUNTER — ONCOLOGY VISIT (OUTPATIENT)
Dept: ONCOLOGY | Facility: CLINIC | Age: 86
End: 2021-09-21
Attending: NURSE PRACTITIONER
Payer: COMMERCIAL

## 2021-09-21 VITALS
RESPIRATION RATE: 16 BRPM | DIASTOLIC BLOOD PRESSURE: 65 MMHG | OXYGEN SATURATION: 99 % | BODY MASS INDEX: 24.62 KG/M2 | SYSTOLIC BLOOD PRESSURE: 123 MMHG | HEART RATE: 71 BPM | WEIGHT: 157.2 LBS | TEMPERATURE: 97.7 F

## 2021-09-21 DIAGNOSIS — C18.9 MALIGNANT NEOPLASM OF COLON, UNSPECIFIED PART OF COLON (H): Primary | ICD-10-CM

## 2021-09-21 DIAGNOSIS — R97.8 ELEVATED TUMOR MARKERS: ICD-10-CM

## 2021-09-21 PROCEDURE — G0463 HOSPITAL OUTPT CLINIC VISIT: HCPCS

## 2021-09-21 PROCEDURE — 99213 OFFICE O/P EST LOW 20 MIN: CPT | Performed by: NURSE PRACTITIONER

## 2021-09-21 ASSESSMENT — PAIN SCALES - GENERAL: PAINLEVEL: NO PAIN (0)

## 2021-09-21 NOTE — LETTER
9/21/2021         RE: Noe Florence  [address] [facility] [address]        Dear Colleague,    Thank you for referring your patient, Noe Florence, to the [facility]. Please see a copy of my visit note below.    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 More recently he was found to have a new cancer in his transverse colon for which he underwent resection in February 2019.  His tumor proved to be a stage II. Adjuvant therapy was not recommended in light of his advanced age and co morbidities. He has been followed every 6 months for CEA and CT's to evaluate for recurrence.       Interval history: Sha is here with his wife. He hasn't had significant changes to his health since his last visit. Has irregular bowel movements, sometimes hard, sometimes loose. Has intermittent incontinence of bowel and bladder. No new bone aches/pains. Eating/drinking ok. Energy is about the same. He sleeps well at night. Naps on occasion. No blood in the stool. No abdominal pain. Weight is stable. No infection concerns.    Past Medical History:   Diagnosis Date     Advanced open-angle glaucoma      Atrial fibrillation (H)      CKD (chronic kidney disease), stage III 2005     Colon cancer (H)     Stage II-B colon cancer     Coronary artery disease     s/p CABG x 2, JEREMY x 2     Diverticulitis      Hyperlipidemia      Hypertension      Ischemic cardiomyopathy      MGUS (monoclonal gammopathy of unknown significance)      Nonsenile cataract     BE     Osteoporosis     left hip fracture     Polymorphic ventricular tachycardia (H)      Polymyalgia rheumatica (H)      PVD (posterior vitreous detachment), both eyes 2005     S/P ablation of atrial flutter 6/20/14    CTI     Stented coronary artery      SVT (supraventricular tachycardia) (H)     PPM/AICD for NSVT      Upper leg DVT (deep venous thromboembolism), chronic (H)     Left     Weight loss, unintentional 2017    15 lb in 4 months         Current Outpatient Medications   Medication Sig Dispense Refill     alendronate (FOSAMAX) 70 MG tablet Take 1 tablet (70 mg) by mouth every 7 days Take with a full glass of water and do not eat or lay down for 30 minutes 12 tablet 3     ARIPiprazole (ABILIFY) 2 MG tablet Take 2 mg by mouth daily       atorvastatin (LIPITOR) 20 MG tablet Take 1 tablet (20 mg) by mouth daily 100 tablet 3     calcium carbonate 500 mg, elemental, (OSCAL;OYSTER SHELL CALCIUM) 500 MG tablet Take 1 tablet (500 mg) by mouth 2 times daily 180 tablet 3     cholecalciferol 1000 units TABS Take 1,000 Units by mouth daily        COMPRESSION STOCKINGS 1 each daily 1 each 4     Cyanocobalamin (VITAMIN B-12 CR PO)        ferrous sulfate (FE TABS) 325 (65 Fe) MG EC tablet Take 325 mg by mouth daily       ketoconazole (NIZORAL) 2 % external cream Apply to the feet 2 times a day until rash clears then 3-4 times a week for maintenance 60 g 11     metoprolol tartrate (LOPRESSOR) 25 MG tablet Take 1 tablet (25 mg) by mouth 2 times daily 180 tablet 3     mirtazapine (REMERON) 15 MG tablet Take 15 mg by mouth At Bedtime.       Multiple Vitamins-Minerals (MULTIVITAMIN ADULT PO)        sertraline (ZOLOFT) 100 MG tablet Take 150 mg by mouth every evening        tamsulosin (FLOMAX) 0.4 MG capsule Take 2 capsules (0.8 mg) by mouth daily 180 capsule 3     tiZANidine (ZANAFLEX) 2 MG tablet Take 1-2 tablets (2-4 mg) by mouth 3 times daily as needed for muscle spasms 30 tablet 0     warfarin ANTICOAGULANT (COUMADIN) 5 MG tablet Take 1 to 1.5 tablets daily or as directed by the Coumadin clinic 90 tablet 4          Allergies   Allergen Reactions     Latex Rash     Rash         Exam: alert,elderly appearing man in NAD. Hard of hearing. Was seated in a wheelchair  Blood pressure 123/65, pulse 71, temperature 97.7  F (36.5  C),  temperature source Oral, resp. rate 16, weight 71.3 kg (157 lb 3.2 oz), SpO2 99 %.  Wt Readings from Last 4 Encounters:   05/10/21 70.3 kg (155 lb)   05/03/21 69.4 kg (153 lb)   03/23/21 71.1 kg (156 lb 12.8 oz)   02/02/21 70.8 kg (156 lb)         Labs:     Results for JOSE LUIS MACHADO (MRN [id number]) as of 9/21/2021 16:10   Ref. Range 8/12/2019 10:45 2/21/2020 12:01 8/21/2020 16:42 2/15/2021 15:46 9/20/2021 15:45   CEA Latest Ref Range: 0.0 - 2.5 ug/L 4.5 (H) 4.7 (H) 4.6 (H) 5.5 (H) 4.7 (H)     Results for JOSE LUIS MACHADO (MRN [id number]) as of 9/21/2021 13:26   Ref. Range 9/20/2021 15:45   Sodium Latest Ref Range: 133 - 144 mmol/L 144   Potassium Latest Ref Range: 3.4 - 5.3 mmol/L 4.8   Chloride Latest Ref Range: 94 - 109 mmol/L 113 (H)   Carbon Dioxide Latest Ref Range: 20 - 32 mmol/L 26   Urea Nitrogen Latest Ref Range: 7 - 30 mg/dL 56 (H)   Creatinine Latest Ref Range: 0.66 - 1.25 mg/dL 1.77 (H)   GFR Estimate Latest Ref Range: >60 mL/min/1.73m2 34 (L)   Calcium Latest Ref Range: 8.5 - 10.1 mg/dL 9.1   Anion Gap Latest Ref Range: 3 - 14 mmol/L 5   Albumin Latest Ref Range: 3.4 - 5.0 g/dL 3.5   Protein Total Latest Ref Range: 6.8 - 8.8 g/dL 7.6   Bilirubin Total Latest Ref Range: 0.2 - 1.3 mg/dL 0.3   Alkaline Phosphatase Latest Ref Range: 40 - 150 U/L 107   ALT Latest Ref Range: 0 - 70 U/L 23   AST Latest Ref Range: 0 - 45 U/L 18   Glucose Latest Ref Range: 70 - 99 mg/dL 78   WBC Latest Ref Range: 4.0 - 11.0 10e3/uL 6.2   Hemoglobin Latest Ref Range: 13.3 - 17.7 g/dL 11.1 (L)   Hematocrit Latest Ref Range: 40.0 - 53.0 % 34.1 (L)   Platelet Count Latest Ref Range: 150 - 450 10e3/uL 155   RBC Count Latest Ref Range: 4.40 - 5.90 10e6/uL 3.61 (L)   MCV Latest Ref Range: 78 - 100 fL 95   MCH Latest Ref Range: 26.5 - 33.0 pg 30.7   MCHC Latest Ref Range: 31.5 - 36.5 g/dL 32.6   RDW Latest Ref Range: 10.0 - 15.0 % 14.6   % Neutrophils Latest Units: % 78   % Lymphocytes Latest Units: % 11   % Monocytes Latest Units: % 9    % Eosinophils Latest Units: % 2   Absolute Basophils Latest Ref Range: 0.0 - 0.2 10e3/uL 0.0   % Basophils Latest Units: % 0   Absolute Eosinophils Latest Ref Range: 0.0 - 0.7 10e3/uL 0.1   Absolute Immature Granulocytes Latest Ref Range: <=0.0 10e3/uL 0.0   Absolute Lymphocytes Latest Ref Range: 0.8 - 5.3 10e3/uL 0.7 (L)   Absolute Monocytes Latest Ref Range: 0.0 - 1.3 10e3/uL 0.6   % Immature Granulocytes Latest Units: % 0   Absolute Neutrophils Latest Ref Range: 1.6 - 8.3 10e3/uL 4.8   Absolute NRBCs Latest Units: 10e3/uL 0.0   NRBCs per 100 WBC Latest Ref Range: <1 /100 0   INR Latest Ref Range: 0.85 - 1.15  2.37 (H)   CEA Latest Ref Range: 0.0 - 2.5 ug/L 4.7 (H)     Results for CARTER JOSE LUIS AGATHA (MRN [id number]) as of 9/29/2021 17:38   Ref. Range 8/12/2019 10:45 2/21/2020 12:01 8/21/2020 16:42 2/15/2021 15:46 9/20/2021 15:45   CEA Latest Ref Range: 0.0 - 2.5 ug/L 4.5 (H) 4.7 (H) 4.6 (H) 5.5 (H) 4.7 (H)     Imaging: EXAMINATION: CT CHEST/ABDOMEN/PELVIS W CONTRAST, 9/18/2021 2:55 PM     TECHNIQUE:  Helical CT images from the thoracic inlet through the  symphysis pubis were obtained  with contrast. Contrast dose: Isovue  370 95cc     COMPARISON: 3/5/2021     HISTORY: Assess for disease recurrence; Malignant neoplasm of colon,  unspecified part of colon (H)     FINDINGS:     LUNGS: Scattered fibrotic changes in the lungs with peripheral  reticulation and patchy areas of groundglass attenuation. No  honeycombing. Scattered atelectatic changes in the lungs particularly  in the lower lobes. No acute airspace opacities. No focal  consolidation. Pleural thickening/scarring along the left lower lobe  with scattered foci of calcification, stable. Biapical pleural  thickening/scarring. Scattered stable sub-4 mm pulmonary nodules. No  new or enlarging pulmonary nodules.     Mediastinum: No thyroid nodules. Central tracheobronchial tree is  patent. Patulous esophagus. Postoperative changes of CABG. Thoracic  aorta and main  pulmonary artery with normal diameter. Dilated right  and left pulmonary arteries, overall stable which could be related to  pulmonary arterial hypertension in the appropriate clinical setting.  Stable enlargement of the heart. No pericardial effusions. No pleural  effusions. No pneumothorax. Prominent mediastinal and hilar lymph  nodes, likely reactive, grossly stable. Left-sided chest wall  implanted device with cardiac leads in place. Mediastinal surgical  clips.     Abdomen and pelvis: No focal liver lesions. Patent liver vasculature.  No biliary tree dilation. No radiodense gallstones. Partial fatty  replacement of the pancreas. Main pancreatic duct is not dilated. The  spleen is not enlarged. No focal splenic lesions.     Unchanged right adrenal lipoma from 2015. Nodular thickening of the  left adrenal gland, stable likely due to adenomatous changes. Mildly  atrophic kidneys likely due to senescent changes. No renal stones or  hydronephrosis. Scattered renal cysts, the largest in the superior  pole of the left kidney measuring 2.1 cm, stable. Normally distended  urinary bladder with wall thickening and trabeculation as well as  diverticuli likely due to chronic outlet obstructive changes. Enlarged  prostate with calcifications and heterogeneous enhancement. Symmetric  seminal vesicles. Pelvic phleboliths. Prominent fat in the left  greater than right inguinal canal.     Prominent inguinal lymph nodes with a preserved fatty hilum, likely  reactive. Subcentimeter pelvic lymph nodes with no convincing  worrisome features. No free fluid. No free air. No abdominal aortic  aneurysm. Vascular calcifications. Subcentimeter retroperitoneal and  mesenteric lymph nodes are not enlarged by size criteria. Aneurysmal  dilation of the bilateral common iliac arteries measuring 1.9 cm on  the right and 2.5 cm on the left with a focal outpouching, stable.     Moderate hiatal hernia. Moderate to large colonic stool burden.  No  dilated loops of bowel. Postoperative changes with suture  material/patent anastomoses at the descending colon. Colonic  diverticulosis. No associated CT findings of acute diverticulitis.  Small fat-containing umbilical hernia. Diastases of the rectus  abdominous muscle with a knuckle of bowel loops and no evidence for  obstruction.     Bones: Diffuse bone demineralization. Median sternotomy wires.  Degenerative changes of the shoulders. Old healed rib fractures.  Degenerative changes of the spine, bilateral SI joints and hips.  Enthesopathic changes of the pelvis and hips. Partially visualized  postoperative changes of left femur ORIF. Scattered sclerotic foci,  stable, presumably benign bone islands. Lumbar curvature with  convexity to the left. Schmorl nodes. Multilevel disc height narrowing  with vacuum phenomenon. No convincing aggressive bone lesions.                                                                      IMPRESSION:  1. Postoperative changes of partial colectomy as described above. No  convincing evidence of metastatic disease in the chest, abdomen,  pelvis and bones.  2. Moderate hiatal hernia.  3. 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  4. Additional incidental findings as described above.     HEBERT GARCIA MD         SYSTEM ID:  [id number]    Impression/plan:   Resected colon cancer. He has no radiographic evidence of recurrence. His CEA remains elevated, but is lower than the last value and consistent with historic, long term elevations  -no clinical symptoms to suggest recurrence at this time  -discussed findings. Recommended he return in 6 months with a CEA. Will not schedule routine imaging at this time unless he is having a new symptom or the CEA dramatically increases. He is ok with that. We discussed whether he should have a colonoscopy. I don't think the benefit outweighs the risk of that at this time. His PS is not likely  such that we  would be able to treat a recurrence if that should occur. They felt comfortable with the plan for monitoring the CEA and have f/u in 6 months    Bowel irregularity  -could try a daily fiber supplement, like Citrucel to improve regularity            Again, thank you for allowing me to participate in the care of your patient.        Sincerely,        MARIA LUISA Montes CNP

## 2021-09-21 NOTE — NURSING NOTE
"Oncology Rooming Note    September 21, 2021 4:09 PM   Noe Florence is a 86 year old male who presents for:    Chief Complaint   Patient presents with     Oncology Clinic Visit     Pt is here rtn for Colon Cancer      Initial Vitals: Blood Pressure 123/65   Pulse 71   Temperature 97.7  F (36.5  C) (Oral)   Respiration 16   Weight 71.3 kg (157 lb 3.2 oz)   Oxygen Saturation 99%   Body Mass Index 24.62 kg/m   Estimated body mass index is 24.62 kg/m  as calculated from the following:    Height as of 2/2/21: 1.702 m (5' 7\").    Weight as of this encounter: 71.3 kg (157 lb 3.2 oz). Body surface area is 1.84 meters squared.  No Pain (0) Comment: Data Unavailable   No LMP for male patient.  Allergies reviewed: Yes  Medications reviewed: Yes    Medications: Medication refills not needed today.  Pharmacy name entered into [employer]:    [facility] [place], [place] - [address]  [facility] - [place], [place] - [address]    Clinical concerns: none       Grace Wayne MA            "

## 2021-09-21 NOTE — PROGRESS NOTES
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 More recently he was found to have a new cancer in his transverse colon for which he underwent resection in February 2019.  His tumor proved to be a stage II. Adjuvant therapy was not recommended in light of his advanced age and co morbidities. He has been followed every 6 months for CEA and CT's to evaluate for recurrence.       Interval history: Sha is here with his wife. He hasn't had significant changes to his health since his last visit. Has irregular bowel movements, sometimes hard, sometimes loose. Has intermittent incontinence of bowel and bladder. No new bone aches/pains. Eating/drinking ok. Energy is about the same. He sleeps well at night. Naps on occasion. No blood in the stool. No abdominal pain. Weight is stable. No infection concerns.    Past Medical History:   Diagnosis Date     Advanced open-angle glaucoma      Atrial fibrillation (H)      CKD (chronic kidney disease), stage III 2005     Colon cancer (H)     Stage II-B colon cancer     Coronary artery disease     s/p CABG x 2, JEREMY x 2     Diverticulitis      Hyperlipidemia      Hypertension      Ischemic cardiomyopathy      MGUS (monoclonal gammopathy of unknown significance)      Nonsenile cataract     BE     Osteoporosis     left hip fracture     Polymorphic ventricular tachycardia (H)      Polymyalgia rheumatica (H)      PVD (posterior vitreous detachment), both eyes 2005     S/P ablation of atrial flutter 6/20/14    CTI     Stented coronary artery      SVT (supraventricular tachycardia) (H)     PPM/AICD for NSVT     Upper leg DVT (deep venous thromboembolism), chronic (H)     Left     Weight loss, unintentional 2017    15 lb in 4 months         Current Outpatient Medications   Medication Sig Dispense Refill     alendronate (FOSAMAX) 70 MG tablet Take 1 tablet (70  mg) by mouth every 7 days Take with a full glass of water and do not eat or lay down for 30 minutes 12 tablet 3     ARIPiprazole (ABILIFY) 2 MG tablet Take 2 mg by mouth daily       atorvastatin (LIPITOR) 20 MG tablet Take 1 tablet (20 mg) by mouth daily 100 tablet 3     calcium carbonate 500 mg, elemental, (OSCAL;OYSTER SHELL CALCIUM) 500 MG tablet Take 1 tablet (500 mg) by mouth 2 times daily 180 tablet 3     cholecalciferol 1000 units TABS Take 1,000 Units by mouth daily        COMPRESSION STOCKINGS 1 each daily 1 each 4     Cyanocobalamin (VITAMIN B-12 CR PO)        ferrous sulfate (FE TABS) 325 (65 Fe) MG EC tablet Take 325 mg by mouth daily       ketoconazole (NIZORAL) 2 % external cream Apply to the feet 2 times a day until rash clears then 3-4 times a week for maintenance 60 g 11     metoprolol tartrate (LOPRESSOR) 25 MG tablet Take 1 tablet (25 mg) by mouth 2 times daily 180 tablet 3     mirtazapine (REMERON) 15 MG tablet Take 15 mg by mouth At Bedtime.       Multiple Vitamins-Minerals (MULTIVITAMIN ADULT PO)        sertraline (ZOLOFT) 100 MG tablet Take 150 mg by mouth every evening        tamsulosin (FLOMAX) 0.4 MG capsule Take 2 capsules (0.8 mg) by mouth daily 180 capsule 3     tiZANidine (ZANAFLEX) 2 MG tablet Take 1-2 tablets (2-4 mg) by mouth 3 times daily as needed for muscle spasms 30 tablet 0     warfarin ANTICOAGULANT (COUMADIN) 5 MG tablet Take 1 to 1.5 tablets daily or as directed by the Coumadin clinic 90 tablet 4          Allergies   Allergen Reactions     Latex Rash     Rash         Exam: alert,elderly appearing man in NAD. Hard of hearing. Was seated in a wheelchair  Blood pressure 123/65, pulse 71, temperature 97.7  F (36.5  C), temperature source Oral, resp. rate 16, weight 71.3 kg (157 lb 3.2 oz), SpO2 99 %.  Wt Readings from Last 4 Encounters:   05/10/21 70.3 kg (155 lb)   05/03/21 69.4 kg (153 lb)   03/23/21 71.1 kg (156 lb 12.8 oz)   02/02/21 70.8 kg (156 lb)         Labs:     Results  for JOSE LUIS MACHADO (MRN [id number]) as of 9/21/2021 16:10   Ref. Range 8/12/2019 10:45 2/21/2020 12:01 8/21/2020 16:42 2/15/2021 15:46 9/20/2021 15:45   CEA Latest Ref Range: 0.0 - 2.5 ug/L 4.5 (H) 4.7 (H) 4.6 (H) 5.5 (H) 4.7 (H)     Results for JOSE LUIS MACHADO (MRN [id number]) as of 9/21/2021 13:26   Ref. Range 9/20/2021 15:45   Sodium Latest Ref Range: 133 - 144 mmol/L 144   Potassium Latest Ref Range: 3.4 - 5.3 mmol/L 4.8   Chloride Latest Ref Range: 94 - 109 mmol/L 113 (H)   Carbon Dioxide Latest Ref Range: 20 - 32 mmol/L 26   Urea Nitrogen Latest Ref Range: 7 - 30 mg/dL 56 (H)   Creatinine Latest Ref Range: 0.66 - 1.25 mg/dL 1.77 (H)   GFR Estimate Latest Ref Range: >60 mL/min/1.73m2 34 (L)   Calcium Latest Ref Range: 8.5 - 10.1 mg/dL 9.1   Anion Gap Latest Ref Range: 3 - 14 mmol/L 5   Albumin Latest Ref Range: 3.4 - 5.0 g/dL 3.5   Protein Total Latest Ref Range: 6.8 - 8.8 g/dL 7.6   Bilirubin Total Latest Ref Range: 0.2 - 1.3 mg/dL 0.3   Alkaline Phosphatase Latest Ref Range: 40 - 150 U/L 107   ALT Latest Ref Range: 0 - 70 U/L 23   AST Latest Ref Range: 0 - 45 U/L 18   Glucose Latest Ref Range: 70 - 99 mg/dL 78   WBC Latest Ref Range: 4.0 - 11.0 10e3/uL 6.2   Hemoglobin Latest Ref Range: 13.3 - 17.7 g/dL 11.1 (L)   Hematocrit Latest Ref Range: 40.0 - 53.0 % 34.1 (L)   Platelet Count Latest Ref Range: 150 - 450 10e3/uL 155   RBC Count Latest Ref Range: 4.40 - 5.90 10e6/uL 3.61 (L)   MCV Latest Ref Range: 78 - 100 fL 95   MCH Latest Ref Range: 26.5 - 33.0 pg 30.7   MCHC Latest Ref Range: 31.5 - 36.5 g/dL 32.6   RDW Latest Ref Range: 10.0 - 15.0 % 14.6   % Neutrophils Latest Units: % 78   % Lymphocytes Latest Units: % 11   % Monocytes Latest Units: % 9   % Eosinophils Latest Units: % 2   Absolute Basophils Latest Ref Range: 0.0 - 0.2 10e3/uL 0.0   % Basophils Latest Units: % 0   Absolute Eosinophils Latest Ref Range: 0.0 - 0.7 10e3/uL 0.1   Absolute Immature Granulocytes Latest Ref Range: <=0.0 10e3/uL 0.0    Absolute Lymphocytes Latest Ref Range: 0.8 - 5.3 10e3/uL 0.7 (L)   Absolute Monocytes Latest Ref Range: 0.0 - 1.3 10e3/uL 0.6   % Immature Granulocytes Latest Units: % 0   Absolute Neutrophils Latest Ref Range: 1.6 - 8.3 10e3/uL 4.8   Absolute NRBCs Latest Units: 10e3/uL 0.0   NRBCs per 100 WBC Latest Ref Range: <1 /100 0   INR Latest Ref Range: 0.85 - 1.15  2.37 (H)   CEA Latest Ref Range: 0.0 - 2.5 ug/L 4.7 (H)     Results for JOSE LUIS MACHADO (MRN [id number]) as of 9/29/2021 17:38   Ref. Range 8/12/2019 10:45 2/21/2020 12:01 8/21/2020 16:42 2/15/2021 15:46 9/20/2021 15:45   CEA Latest Ref Range: 0.0 - 2.5 ug/L 4.5 (H) 4.7 (H) 4.6 (H) 5.5 (H) 4.7 (H)     Imaging: EXAMINATION: CT CHEST/ABDOMEN/PELVIS W CONTRAST, 9/18/2021 2:55 PM     TECHNIQUE:  Helical CT images from the thoracic inlet through the  symphysis pubis were obtained  with contrast. Contrast dose: Isovue  370 95cc     COMPARISON: 3/5/2021     HISTORY: Assess for disease recurrence; Malignant neoplasm of colon,  unspecified part of colon (H)     FINDINGS:     LUNGS: Scattered fibrotic changes in the lungs with peripheral  reticulation and patchy areas of groundglass attenuation. No  honeycombing. Scattered atelectatic changes in the lungs particularly  in the lower lobes. No acute airspace opacities. No focal  consolidation. Pleural thickening/scarring along the left lower lobe  with scattered foci of calcification, stable. Biapical pleural  thickening/scarring. Scattered stable sub-4 mm pulmonary nodules. No  new or enlarging pulmonary nodules.     Mediastinum: No thyroid nodules. Central tracheobronchial tree is  patent. Patulous esophagus. Postoperative changes of CABG. Thoracic  aorta and main pulmonary artery with normal diameter. Dilated right  and left pulmonary arteries, overall stable which could be related to  pulmonary arterial hypertension in the appropriate clinical setting.  Stable enlargement of the heart. No pericardial effusions. No  pleural  effusions. No pneumothorax. Prominent mediastinal and hilar lymph  nodes, likely reactive, grossly stable. Left-sided chest wall  implanted device with cardiac leads in place. Mediastinal surgical  clips.     Abdomen and pelvis: No focal liver lesions. Patent liver vasculature.  No biliary tree dilation. No radiodense gallstones. Partial fatty  replacement of the pancreas. Main pancreatic duct is not dilated. The  spleen is not enlarged. No focal splenic lesions.     Unchanged right adrenal lipoma from 2015. Nodular thickening of the  left adrenal gland, stable likely due to adenomatous changes. Mildly  atrophic kidneys likely due to senescent changes. No renal stones or  hydronephrosis. Scattered renal cysts, the largest in the superior  pole of the left kidney measuring 2.1 cm, stable. Normally distended  urinary bladder with wall thickening and trabeculation as well as  diverticuli likely due to chronic outlet obstructive changes. Enlarged  prostate with calcifications and heterogeneous enhancement. Symmetric  seminal vesicles. Pelvic phleboliths. Prominent fat in the left  greater than right inguinal canal.     Prominent inguinal lymph nodes with a preserved fatty hilum, likely  reactive. Subcentimeter pelvic lymph nodes with no convincing  worrisome features. No free fluid. No free air. No abdominal aortic  aneurysm. Vascular calcifications. Subcentimeter retroperitoneal and  mesenteric lymph nodes are not enlarged by size criteria. Aneurysmal  dilation of the bilateral common iliac arteries measuring 1.9 cm on  the right and 2.5 cm on the left with a focal outpouching, stable.     Moderate hiatal hernia. Moderate to large colonic stool burden. No  dilated loops of bowel. Postoperative changes with suture  material/patent anastomoses at the descending colon. Colonic  diverticulosis. No associated CT findings of acute diverticulitis.  Small fat-containing umbilical hernia. Diastases of the  rectus  abdominous muscle with a knuckle of bowel loops and no evidence for  obstruction.     Bones: Diffuse bone demineralization. Median sternotomy wires.  Degenerative changes of the shoulders. Old healed rib fractures.  Degenerative changes of the spine, bilateral SI joints and hips.  Enthesopathic changes of the pelvis and hips. Partially visualized  postoperative changes of left femur ORIF. Scattered sclerotic foci,  stable, presumably benign bone islands. Lumbar curvature with  convexity to the left. Schmorl nodes. Multilevel disc height narrowing  with vacuum phenomenon. No convincing aggressive bone lesions.                                                                      IMPRESSION:  1. Postoperative changes of partial colectomy as described above. No  convincing evidence of metastatic disease in the chest, abdomen,  pelvis and bones.  2. Moderate hiatal hernia.  3. 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  4. Additional incidental findings as described above.     HEBERT GARCIA MD         SYSTEM ID:  [id number]    Impression/plan:   Resected colon cancer. He has no radiographic evidence of recurrence. His CEA remains elevated, but is lower than the last value and consistent with historic, long term elevations  -no clinical symptoms to suggest recurrence at this time  -discussed findings. Recommended he return in 6 months with a CEA. Will not schedule routine imaging at this time unless he is having a new symptom or the CEA dramatically increases. He is ok with that. We discussed whether he should have a colonoscopy. I don't think the benefit outweighs the risk of that at this time. His PS is not likely  such that we would be able to treat a recurrence if that should occur. They felt comfortable with the plan for monitoring the CEA and have f/u in 6 months    Bowel irregularity  -could try a daily fiber supplement, like Citrucel to improve regularity

## 2021-09-27 LAB
MDC_IDC_EPISODE_DTM: NORMAL
MDC_IDC_EPISODE_DURATION: 110 S
MDC_IDC_EPISODE_DURATION: 24 S
MDC_IDC_EPISODE_DURATION: 26 S
MDC_IDC_EPISODE_DURATION: 493 S
MDC_IDC_EPISODE_DURATION: 54 S
MDC_IDC_EPISODE_ID: 66
MDC_IDC_EPISODE_ID: 67
MDC_IDC_EPISODE_ID: 68
MDC_IDC_EPISODE_ID: 69
MDC_IDC_EPISODE_ID: 70
MDC_IDC_EPISODE_TYPE: NORMAL
MDC_IDC_LEAD_IMPLANT_DT: NORMAL
MDC_IDC_LEAD_IMPLANT_DT: NORMAL
MDC_IDC_LEAD_LOCATION: NORMAL
MDC_IDC_LEAD_LOCATION: NORMAL
MDC_IDC_LEAD_LOCATION_DETAIL_1: NORMAL
MDC_IDC_LEAD_LOCATION_DETAIL_1: NORMAL
MDC_IDC_LEAD_MFG: NORMAL
MDC_IDC_LEAD_MFG: NORMAL
MDC_IDC_LEAD_MODEL: NORMAL
MDC_IDC_LEAD_MODEL: NORMAL
MDC_IDC_LEAD_POLARITY_TYPE: NORMAL
MDC_IDC_LEAD_POLARITY_TYPE: NORMAL
MDC_IDC_LEAD_SERIAL: NORMAL
MDC_IDC_LEAD_SERIAL: NORMAL
MDC_IDC_MSMT_BATTERY_DTM: NORMAL
MDC_IDC_MSMT_BATTERY_REMAINING_LONGEVITY: 81 MO
MDC_IDC_MSMT_BATTERY_RRT_TRIGGER: 2.73
MDC_IDC_MSMT_BATTERY_STATUS: NORMAL
MDC_IDC_MSMT_BATTERY_VOLTAGE: 2.98 V
MDC_IDC_MSMT_CAP_CHARGE_DTM: NORMAL
MDC_IDC_MSMT_CAP_CHARGE_ENERGY: 18 J
MDC_IDC_MSMT_CAP_CHARGE_TIME: 3.8
MDC_IDC_MSMT_CAP_CHARGE_TYPE: NORMAL
MDC_IDC_MSMT_LEADCHNL_RA_IMPEDANCE_VALUE: 418 OHM
MDC_IDC_MSMT_LEADCHNL_RA_PACING_THRESHOLD_AMPLITUDE: 0.5 V
MDC_IDC_MSMT_LEADCHNL_RA_PACING_THRESHOLD_PULSEWIDTH: 0.4 MS
MDC_IDC_MSMT_LEADCHNL_RA_SENSING_INTR_AMPL: 0.62 MV
MDC_IDC_MSMT_LEADCHNL_RA_SENSING_INTR_AMPL: 0.62 MV
MDC_IDC_MSMT_LEADCHNL_RV_IMPEDANCE_VALUE: 304 OHM
MDC_IDC_MSMT_LEADCHNL_RV_IMPEDANCE_VALUE: 342 OHM
MDC_IDC_MSMT_LEADCHNL_RV_PACING_THRESHOLD_AMPLITUDE: 0.88 V
MDC_IDC_MSMT_LEADCHNL_RV_PACING_THRESHOLD_PULSEWIDTH: 0.4 MS
MDC_IDC_MSMT_LEADCHNL_RV_SENSING_INTR_AMPL: 9.25 MV
MDC_IDC_MSMT_LEADCHNL_RV_SENSING_INTR_AMPL: 9.25 MV
MDC_IDC_PG_IMPLANT_DTM: NORMAL
MDC_IDC_PG_MFG: NORMAL
MDC_IDC_PG_MODEL: NORMAL
MDC_IDC_PG_SERIAL: NORMAL
MDC_IDC_PG_TYPE: NORMAL
MDC_IDC_SESS_CLINIC_NAME: NORMAL
MDC_IDC_SESS_DTM: NORMAL
MDC_IDC_SESS_TYPE: NORMAL
MDC_IDC_SET_BRADY_AT_MODE_SWITCH_RATE: 171 {BEATS}/MIN
MDC_IDC_SET_BRADY_HYSTRATE: NORMAL
MDC_IDC_SET_BRADY_LOWRATE: 60 {BEATS}/MIN
MDC_IDC_SET_BRADY_MAX_SENSOR_RATE: 130 {BEATS}/MIN
MDC_IDC_SET_BRADY_MAX_TRACKING_RATE: 130 {BEATS}/MIN
MDC_IDC_SET_BRADY_MODE: NORMAL
MDC_IDC_SET_BRADY_PAV_DELAY_LOW: 180 MS
MDC_IDC_SET_BRADY_SAV_DELAY_LOW: 150 MS
MDC_IDC_SET_LEADCHNL_RA_PACING_AMPLITUDE: 1.5 V
MDC_IDC_SET_LEADCHNL_RA_PACING_ANODE_ELECTRODE_1: NORMAL
MDC_IDC_SET_LEADCHNL_RA_PACING_ANODE_LOCATION_1: NORMAL
MDC_IDC_SET_LEADCHNL_RA_PACING_CAPTURE_MODE: NORMAL
MDC_IDC_SET_LEADCHNL_RA_PACING_CATHODE_ELECTRODE_1: NORMAL
MDC_IDC_SET_LEADCHNL_RA_PACING_CATHODE_LOCATION_1: NORMAL
MDC_IDC_SET_LEADCHNL_RA_PACING_POLARITY: NORMAL
MDC_IDC_SET_LEADCHNL_RA_PACING_PULSEWIDTH: 0.4 MS
MDC_IDC_SET_LEADCHNL_RA_SENSING_ANODE_ELECTRODE_1: NORMAL
MDC_IDC_SET_LEADCHNL_RA_SENSING_ANODE_LOCATION_1: NORMAL
MDC_IDC_SET_LEADCHNL_RA_SENSING_CATHODE_ELECTRODE_1: NORMAL
MDC_IDC_SET_LEADCHNL_RA_SENSING_CATHODE_LOCATION_1: NORMAL
MDC_IDC_SET_LEADCHNL_RA_SENSING_POLARITY: NORMAL
MDC_IDC_SET_LEADCHNL_RA_SENSING_SENSITIVITY: 0.3 MV
MDC_IDC_SET_LEADCHNL_RV_PACING_AMPLITUDE: 2 V
MDC_IDC_SET_LEADCHNL_RV_PACING_ANODE_ELECTRODE_1: NORMAL
MDC_IDC_SET_LEADCHNL_RV_PACING_ANODE_LOCATION_1: NORMAL
MDC_IDC_SET_LEADCHNL_RV_PACING_CAPTURE_MODE: NORMAL
MDC_IDC_SET_LEADCHNL_RV_PACING_CATHODE_ELECTRODE_1: NORMAL
MDC_IDC_SET_LEADCHNL_RV_PACING_CATHODE_LOCATION_1: NORMAL
MDC_IDC_SET_LEADCHNL_RV_PACING_POLARITY: NORMAL
MDC_IDC_SET_LEADCHNL_RV_PACING_PULSEWIDTH: 0.4 MS
MDC_IDC_SET_LEADCHNL_RV_SENSING_ANODE_ELECTRODE_1: NORMAL
MDC_IDC_SET_LEADCHNL_RV_SENSING_ANODE_LOCATION_1: NORMAL
MDC_IDC_SET_LEADCHNL_RV_SENSING_CATHODE_ELECTRODE_1: NORMAL
MDC_IDC_SET_LEADCHNL_RV_SENSING_CATHODE_LOCATION_1: NORMAL
MDC_IDC_SET_LEADCHNL_RV_SENSING_POLARITY: NORMAL
MDC_IDC_SET_LEADCHNL_RV_SENSING_SENSITIVITY: 0.45 MV
MDC_IDC_SET_ZONE_DETECTION_BEATS_DENOMINATOR: 24 {BEATS}
MDC_IDC_SET_ZONE_DETECTION_BEATS_NUMERATOR: 18 {BEATS}
MDC_IDC_SET_ZONE_DETECTION_INTERVAL: 300 MS
MDC_IDC_SET_ZONE_DETECTION_INTERVAL: 320 MS
MDC_IDC_SET_ZONE_DETECTION_INTERVAL: 350 MS
MDC_IDC_SET_ZONE_DETECTION_INTERVAL: 430 MS
MDC_IDC_SET_ZONE_DETECTION_INTERVAL: NORMAL
MDC_IDC_SET_ZONE_TYPE: NORMAL
MDC_IDC_STAT_AT_BURDEN_PERCENT: 0 %
MDC_IDC_STAT_AT_DTM_END: NORMAL
MDC_IDC_STAT_AT_DTM_START: NORMAL
MDC_IDC_STAT_BRADY_AP_VP_PERCENT: 58.75 %
MDC_IDC_STAT_BRADY_AP_VS_PERCENT: 15.68 %
MDC_IDC_STAT_BRADY_AS_VP_PERCENT: 15.62 %
MDC_IDC_STAT_BRADY_AS_VS_PERCENT: 9.95 %
MDC_IDC_STAT_BRADY_DTM_END: NORMAL
MDC_IDC_STAT_BRADY_DTM_START: NORMAL
MDC_IDC_STAT_BRADY_RA_PERCENT_PACED: 68.29 %
MDC_IDC_STAT_BRADY_RV_PERCENT_PACED: 73.25 %
MDC_IDC_STAT_EPISODE_RECENT_COUNT: 0
MDC_IDC_STAT_EPISODE_RECENT_COUNT: 5
MDC_IDC_STAT_EPISODE_RECENT_COUNT_DTM_END: NORMAL
MDC_IDC_STAT_EPISODE_RECENT_COUNT_DTM_START: NORMAL
MDC_IDC_STAT_EPISODE_TOTAL_COUNT: 0
MDC_IDC_STAT_EPISODE_TOTAL_COUNT: 5
MDC_IDC_STAT_EPISODE_TOTAL_COUNT: 65
MDC_IDC_STAT_EPISODE_TOTAL_COUNT_DTM_END: NORMAL
MDC_IDC_STAT_EPISODE_TOTAL_COUNT_DTM_START: NORMAL
MDC_IDC_STAT_EPISODE_TYPE: NORMAL
MDC_IDC_STAT_TACHYTHERAPY_ATP_DELIVERED_RECENT: 0
MDC_IDC_STAT_TACHYTHERAPY_ATP_DELIVERED_TOTAL: 0
MDC_IDC_STAT_TACHYTHERAPY_RECENT_DTM_END: NORMAL
MDC_IDC_STAT_TACHYTHERAPY_RECENT_DTM_START: NORMAL
MDC_IDC_STAT_TACHYTHERAPY_SHOCKS_ABORTED_RECENT: 0
MDC_IDC_STAT_TACHYTHERAPY_SHOCKS_ABORTED_TOTAL: 0
MDC_IDC_STAT_TACHYTHERAPY_SHOCKS_DELIVERED_RECENT: 0
MDC_IDC_STAT_TACHYTHERAPY_SHOCKS_DELIVERED_TOTAL: 0
MDC_IDC_STAT_TACHYTHERAPY_TOTAL_DTM_END: NORMAL
MDC_IDC_STAT_TACHYTHERAPY_TOTAL_DTM_START: NORMAL

## 2021-10-06 ENCOUNTER — TELEPHONE (OUTPATIENT)
Dept: INTERNAL MEDICINE | Facility: CLINIC | Age: 86
End: 2021-10-06

## 2021-10-06 NOTE — TELEPHONE ENCOUNTER
[facility] Call Center    Phone Message    May a detailed message be left on voicemail: yes     Reason for Call: Other: Patients significant other calling requesting to see if patient should have labs done prior to his upcoming appointment with Dr. Sarmiento on 11/3/21. Please call to discuss thank you.      Action Taken: Message routed to:  [facility] pcc    Travel Screening: Not Applicable

## 2021-10-07 NOTE — TELEPHONE ENCOUNTER
Attempted to call pt/wife back but they did not answer and there is not option to leave a voice mail. If pt/wife call back, please relay the following:    Pt just had routine labs in may and were normal per provider, and routinely gets an INR done should any labs need to be added at appointment for next INR draw. No additional labs needed at this time. Saray Osorio LPN 10/7/2021 10:44 AM

## 2021-10-18 ENCOUNTER — ANTICOAGULATION THERAPY VISIT (OUTPATIENT)
Dept: ANTICOAGULATION | Facility: CLINIC | Age: 86
End: 2021-10-18

## 2021-10-18 ENCOUNTER — LAB (OUTPATIENT)
Dept: LAB | Facility: CLINIC | Age: 86
End: 2021-10-18
Attending: INTERNAL MEDICINE
Payer: COMMERCIAL

## 2021-10-18 ENCOUNTER — TRANSFERRED RECORDS (OUTPATIENT)
Dept: HEALTH INFORMATION MANAGEMENT | Facility: CLINIC | Age: 86
End: 2021-10-18

## 2021-10-18 DIAGNOSIS — I82.409 DEEP VEIN THROMBOSIS (DVT) (H): ICD-10-CM

## 2021-10-18 DIAGNOSIS — Z79.01 LONG TERM CURRENT USE OF ANTICOAGULANT THERAPY: ICD-10-CM

## 2021-10-18 DIAGNOSIS — I48.91 ATRIAL FIBRILLATION (H): Primary | ICD-10-CM

## 2021-10-18 DIAGNOSIS — I48.91 ATRIAL FIBRILLATION, UNSPECIFIED TYPE (H): ICD-10-CM

## 2021-10-18 LAB — INR PPP: 3.04 (ref 0.85–1.15)

## 2021-10-18 PROCEDURE — 85610 PROTHROMBIN TIME: CPT | Performed by: PATHOLOGY

## 2021-10-18 PROCEDURE — 36415 COLL VENOUS BLD VENIPUNCTURE: CPT | Performed by: PATHOLOGY

## 2021-10-18 NOTE — PROGRESS NOTES
ANTICOAGULATION MANAGEMENT     Noe Florence 86 year old male is on warfarin with supratherapeutic INR result. (Goal INR 1.5-2.5)    Recent labs: (last 7 days)     10/18/21  1634   INR 3.04*       ASSESSMENT     Source(s): Chart Review and Patient/Caregiver Call       Warfarin doses taken: Warfarin taken as instructed    Diet: Decreased greens/vitamin K in diet; plans to resume previous intake    New illness, injury, or hospitalization: No    Medication/supplement changes: None noted    Signs or symptoms of bleeding or clotting: No    Previous INR: Therapeutic last 2(+) visits    Additional findings: None     PLAN     Recommended plan for temporary change(s) affecting INR     Dosing Instructions: Partial hold then continue your current warfarin dose with next INR in 1-2 weeks       Summary  As of 10/18/2021    Full warfarin instructions:  10/18: 2.5 mg; Otherwise 7.5 mg every Wed; 5 mg all other days   Next INR check:  11/1/2021             Telephone call with  patient's friend who verbalizes understanding and agrees to plan and who agrees to plan and repeated back plan correctly    Lab visit scheduled    Education provided: Importance of consistent vitamin K intake, Goal range and significance of current result, Monitoring for bleeding signs and symptoms, Importance of notifying clinic for changes in medications; a sooner lab recheck maybe needed., Importance of notifying clinic for diarrhea, nausea/vomiting, reduced intake, and/or illness; a sooner lab recheck maybe needed., Importance of notifying clinic of upcoming surgeries and procedures 2 weeks in advance and Contact [phone] with any changes, questions or concerns.     Plan made with [facility] Pharmacist Maddy LOONEY RN  Anticoagulation Clinic  10/18/2021    _______________________________________________________________________     Anticoagulation Episode Summary     Current INR goal:  1.5-2.5   TTR:  93.8 % (1 y)   Target end date:   Indefinite   Send INR reminders to:  [facility]    Indications    Atrial fibrillation (H) [I48.91] [I48.91]  Long-term (current) use of anticoagulants [Z79.01] [Z79.01]  Deep vein thrombosis (DVT) (H) [I82.409]  Atrial fibrillation  unspecified type (H) [I48.91]           Comments:  Patient contact number: [phone] Hx of Falls/Bleed  Can leave results with Rica (friend)  Restarted Warfarin due to DVT.          Anticoagulation Care Providers     Provider Role Specialty Phone number    Frida Desai MD Referring Cardiovascular Disease [phone]

## 2021-10-20 ENCOUNTER — TELEPHONE (OUTPATIENT)
Dept: INTERNAL MEDICINE | Facility: CLINIC | Age: 86
End: 2021-10-20

## 2021-10-20 NOTE — TELEPHONE ENCOUNTER
[facility] Call Center    Phone Message    May a detailed message be left on voicemail: yes     Reason for Call: Other: Patients significant other calling requesting a call back from pts care team. pts so stating pt missed a call from a nurse and would like a call back to discuss.    Action Taken: Message routed to:  [facility]    Travel Screening: Not Applicable

## 2021-10-21 NOTE — TELEPHONE ENCOUNTER
Called and left VM.  Returned patient call about missed nurse call on 10/07/21 about whether patient need any labs done prior to Dr. Sarmiento appointment.  No labs to be done prior to appointment.  Only routine INR check as usual.  No additional labs.  Pt does not need to fast at the appointment.      Niranjan Galaviz CMA ([facility]) at 2:48 PM on 10/21/2021

## 2021-11-03 ENCOUNTER — LAB (OUTPATIENT)
Dept: LAB | Facility: CLINIC | Age: 86
End: 2021-11-03
Attending: NURSE PRACTITIONER
Payer: COMMERCIAL

## 2021-11-03 ENCOUNTER — OFFICE VISIT (OUTPATIENT)
Dept: INTERNAL MEDICINE | Facility: CLINIC | Age: 86
End: 2021-11-03
Payer: COMMERCIAL

## 2021-11-03 ENCOUNTER — ANTICOAGULATION THERAPY VISIT (OUTPATIENT)
Dept: ANTICOAGULATION | Facility: CLINIC | Age: 86
End: 2021-11-03

## 2021-11-03 ENCOUNTER — ANCILLARY PROCEDURE (OUTPATIENT)
Dept: CARDIOLOGY | Facility: CLINIC | Age: 86
End: 2021-11-03
Attending: INTERNAL MEDICINE
Payer: COMMERCIAL

## 2021-11-03 VITALS
DIASTOLIC BLOOD PRESSURE: 58 MMHG | OXYGEN SATURATION: 95 % | WEIGHT: 155.5 LBS | HEART RATE: 70 BPM | SYSTOLIC BLOOD PRESSURE: 113 MMHG | BODY MASS INDEX: 24.4 KG/M2 | HEIGHT: 67 IN

## 2021-11-03 DIAGNOSIS — M94.9 DISORDER OF BONE AND CARTILAGE: ICD-10-CM

## 2021-11-03 DIAGNOSIS — D47.2 MONOCLONAL PARAPROTEINEMIA: ICD-10-CM

## 2021-11-03 DIAGNOSIS — Z79.01 LONG TERM CURRENT USE OF ANTICOAGULANT THERAPY: ICD-10-CM

## 2021-11-03 DIAGNOSIS — I48.0 PAROXYSMAL ATRIAL FIBRILLATION (H): ICD-10-CM

## 2021-11-03 DIAGNOSIS — N18.31 STAGE 3A CHRONIC KIDNEY DISEASE (H): ICD-10-CM

## 2021-11-03 DIAGNOSIS — Z95.810 ICD (IMPLANTABLE CARDIOVERTER-DEFIBRILLATOR) IN PLACE: ICD-10-CM

## 2021-11-03 DIAGNOSIS — R35.0 URINARY FREQUENCY: ICD-10-CM

## 2021-11-03 DIAGNOSIS — I82.409 DEEP VEIN THROMBOSIS (DVT) (H): ICD-10-CM

## 2021-11-03 DIAGNOSIS — I47.20 VENTRICULAR TACHYARRHYTHMIA (H): ICD-10-CM

## 2021-11-03 DIAGNOSIS — I48.91 ATRIAL FIBRILLATION, UNSPECIFIED TYPE (H): ICD-10-CM

## 2021-11-03 DIAGNOSIS — M79.10 MYALGIA: ICD-10-CM

## 2021-11-03 DIAGNOSIS — M89.9 DISORDER OF BONE AND CARTILAGE: ICD-10-CM

## 2021-11-03 DIAGNOSIS — I10 HYPERTENSION, UNSPECIFIED TYPE: Primary | ICD-10-CM

## 2021-11-03 DIAGNOSIS — I48.91 ATRIAL FIBRILLATION (H): Primary | ICD-10-CM

## 2021-11-03 DIAGNOSIS — I10 HYPERTENSION, UNSPECIFIED TYPE: ICD-10-CM

## 2021-11-03 LAB
ALBUMIN UR-MCNC: NEGATIVE MG/DL
ANION GAP SERPL CALCULATED.3IONS-SCNC: 8 MMOL/L (ref 3–14)
APPEARANCE UR: CLEAR
BILIRUB UR QL STRIP: NEGATIVE
BUN SERPL-MCNC: 55 MG/DL (ref 7–30)
CALCIUM SERPL-MCNC: 9.1 MG/DL (ref 8.5–10.1)
CHLORIDE BLD-SCNC: 108 MMOL/L (ref 94–109)
CK SERPL-CCNC: 59 U/L (ref 30–300)
CO2 SERPL-SCNC: 25 MMOL/L (ref 20–32)
COLOR UR AUTO: YELLOW
CREAT SERPL-MCNC: 1.65 MG/DL (ref 0.66–1.25)
ERYTHROCYTE [SEDIMENTATION RATE] IN BLOOD BY WESTERGREN METHOD: 42 MM/HR (ref 0–20)
GFR SERPL CREATININE-BSD FRML MDRD: 37 ML/MIN/1.73M2
GLUCOSE BLD-MCNC: 60 MG/DL (ref 70–99)
GLUCOSE UR STRIP-MCNC: NEGATIVE MG/DL
HGB UR QL STRIP: NEGATIVE
HYALINE CASTS: 6 /LPF
INR PPP: 2.07 (ref 0.85–1.15)
KETONES UR STRIP-MCNC: NEGATIVE MG/DL
LEUKOCYTE ESTERASE UR QL STRIP: NEGATIVE
MDC_IDC_EPISODE_DTM: NORMAL
MDC_IDC_EPISODE_DURATION: 234 S
MDC_IDC_EPISODE_ID: 71
MDC_IDC_EPISODE_TYPE: NORMAL
MDC_IDC_LEAD_IMPLANT_DT: NORMAL
MDC_IDC_LEAD_IMPLANT_DT: NORMAL
MDC_IDC_LEAD_LOCATION: NORMAL
MDC_IDC_LEAD_LOCATION: NORMAL
MDC_IDC_LEAD_LOCATION_DETAIL_1: NORMAL
MDC_IDC_LEAD_LOCATION_DETAIL_1: NORMAL
MDC_IDC_LEAD_MFG: NORMAL
MDC_IDC_LEAD_MFG: NORMAL
MDC_IDC_LEAD_MODEL: NORMAL
MDC_IDC_LEAD_MODEL: NORMAL
MDC_IDC_LEAD_POLARITY_TYPE: NORMAL
MDC_IDC_LEAD_POLARITY_TYPE: NORMAL
MDC_IDC_LEAD_SERIAL: NORMAL
MDC_IDC_LEAD_SERIAL: NORMAL
MDC_IDC_MSMT_BATTERY_DTM: NORMAL
MDC_IDC_MSMT_BATTERY_REMAINING_LONGEVITY: 79 MO
MDC_IDC_MSMT_BATTERY_RRT_TRIGGER: 2.73
MDC_IDC_MSMT_BATTERY_STATUS: NORMAL
MDC_IDC_MSMT_BATTERY_VOLTAGE: 2.95 V
MDC_IDC_MSMT_CAP_CHARGE_DTM: NORMAL
MDC_IDC_MSMT_CAP_CHARGE_ENERGY: 18 J
MDC_IDC_MSMT_CAP_CHARGE_TIME: 3.81
MDC_IDC_MSMT_CAP_CHARGE_TYPE: NORMAL
MDC_IDC_MSMT_LEADCHNL_RA_IMPEDANCE_VALUE: 456 OHM
MDC_IDC_MSMT_LEADCHNL_RA_PACING_THRESHOLD_AMPLITUDE: 0.5 V
MDC_IDC_MSMT_LEADCHNL_RA_PACING_THRESHOLD_PULSEWIDTH: 0.4 MS
MDC_IDC_MSMT_LEADCHNL_RA_SENSING_INTR_AMPL: 0.6 MV
MDC_IDC_MSMT_LEADCHNL_RV_IMPEDANCE_VALUE: 304 OHM
MDC_IDC_MSMT_LEADCHNL_RV_IMPEDANCE_VALUE: 361 OHM
MDC_IDC_MSMT_LEADCHNL_RV_PACING_THRESHOLD_AMPLITUDE: 1.25 V
MDC_IDC_MSMT_LEADCHNL_RV_PACING_THRESHOLD_PULSEWIDTH: 0.4 MS
MDC_IDC_MSMT_LEADCHNL_RV_SENSING_INTR_AMPL: 9.5 MV
MDC_IDC_PG_IMPLANT_DTM: NORMAL
MDC_IDC_PG_MFG: NORMAL
MDC_IDC_PG_MODEL: NORMAL
MDC_IDC_PG_SERIAL: NORMAL
MDC_IDC_PG_TYPE: NORMAL
MDC_IDC_SESS_CLINIC_NAME: NORMAL
MDC_IDC_SESS_DTM: NORMAL
MDC_IDC_SESS_TYPE: NORMAL
MDC_IDC_SET_BRADY_AT_MODE_SWITCH_RATE: 171 {BEATS}/MIN
MDC_IDC_SET_BRADY_HYSTRATE: NORMAL
MDC_IDC_SET_BRADY_LOWRATE: 60 {BEATS}/MIN
MDC_IDC_SET_BRADY_MAX_SENSOR_RATE: 130 {BEATS}/MIN
MDC_IDC_SET_BRADY_MAX_TRACKING_RATE: 130 {BEATS}/MIN
MDC_IDC_SET_BRADY_MODE: NORMAL
MDC_IDC_SET_BRADY_PAV_DELAY_LOW: 180 MS
MDC_IDC_SET_BRADY_SAV_DELAY_LOW: 150 MS
MDC_IDC_SET_LEADCHNL_RA_PACING_AMPLITUDE: 1.5 V
MDC_IDC_SET_LEADCHNL_RA_PACING_ANODE_ELECTRODE_1: NORMAL
MDC_IDC_SET_LEADCHNL_RA_PACING_ANODE_LOCATION_1: NORMAL
MDC_IDC_SET_LEADCHNL_RA_PACING_CAPTURE_MODE: NORMAL
MDC_IDC_SET_LEADCHNL_RA_PACING_CATHODE_ELECTRODE_1: NORMAL
MDC_IDC_SET_LEADCHNL_RA_PACING_CATHODE_LOCATION_1: NORMAL
MDC_IDC_SET_LEADCHNL_RA_PACING_POLARITY: NORMAL
MDC_IDC_SET_LEADCHNL_RA_PACING_PULSEWIDTH: 0.4 MS
MDC_IDC_SET_LEADCHNL_RA_SENSING_ANODE_ELECTRODE_1: NORMAL
MDC_IDC_SET_LEADCHNL_RA_SENSING_ANODE_LOCATION_1: NORMAL
MDC_IDC_SET_LEADCHNL_RA_SENSING_CATHODE_ELECTRODE_1: NORMAL
MDC_IDC_SET_LEADCHNL_RA_SENSING_CATHODE_LOCATION_1: NORMAL
MDC_IDC_SET_LEADCHNL_RA_SENSING_POLARITY: NORMAL
MDC_IDC_SET_LEADCHNL_RA_SENSING_SENSITIVITY: 0.3 MV
MDC_IDC_SET_LEADCHNL_RV_PACING_AMPLITUDE: 2 V
MDC_IDC_SET_LEADCHNL_RV_PACING_ANODE_ELECTRODE_1: NORMAL
MDC_IDC_SET_LEADCHNL_RV_PACING_ANODE_LOCATION_1: NORMAL
MDC_IDC_SET_LEADCHNL_RV_PACING_CAPTURE_MODE: NORMAL
MDC_IDC_SET_LEADCHNL_RV_PACING_CATHODE_ELECTRODE_1: NORMAL
MDC_IDC_SET_LEADCHNL_RV_PACING_CATHODE_LOCATION_1: NORMAL
MDC_IDC_SET_LEADCHNL_RV_PACING_POLARITY: NORMAL
MDC_IDC_SET_LEADCHNL_RV_PACING_PULSEWIDTH: 0.4 MS
MDC_IDC_SET_LEADCHNL_RV_SENSING_ANODE_ELECTRODE_1: NORMAL
MDC_IDC_SET_LEADCHNL_RV_SENSING_ANODE_LOCATION_1: NORMAL
MDC_IDC_SET_LEADCHNL_RV_SENSING_CATHODE_ELECTRODE_1: NORMAL
MDC_IDC_SET_LEADCHNL_RV_SENSING_CATHODE_LOCATION_1: NORMAL
MDC_IDC_SET_LEADCHNL_RV_SENSING_POLARITY: NORMAL
MDC_IDC_SET_LEADCHNL_RV_SENSING_SENSITIVITY: 0.45 MV
MDC_IDC_SET_ZONE_DETECTION_BEATS_DENOMINATOR: 24 {BEATS}
MDC_IDC_SET_ZONE_DETECTION_BEATS_NUMERATOR: 18 {BEATS}
MDC_IDC_SET_ZONE_DETECTION_INTERVAL: 300 MS
MDC_IDC_SET_ZONE_DETECTION_INTERVAL: 320 MS
MDC_IDC_SET_ZONE_DETECTION_INTERVAL: 350 MS
MDC_IDC_SET_ZONE_DETECTION_INTERVAL: 430 MS
MDC_IDC_SET_ZONE_DETECTION_INTERVAL: NORMAL
MDC_IDC_SET_ZONE_TYPE: NORMAL
MDC_IDC_STAT_AT_BURDEN_PERCENT: 0 %
MDC_IDC_STAT_AT_DTM_END: NORMAL
MDC_IDC_STAT_AT_DTM_START: NORMAL
MDC_IDC_STAT_BRADY_AP_VP_PERCENT: 54.55 %
MDC_IDC_STAT_BRADY_AP_VS_PERCENT: 20.17 %
MDC_IDC_STAT_BRADY_AS_VP_PERCENT: 13.18 %
MDC_IDC_STAT_BRADY_AS_VS_PERCENT: 12.1 %
MDC_IDC_STAT_BRADY_DTM_END: NORMAL
MDC_IDC_STAT_BRADY_DTM_START: NORMAL
MDC_IDC_STAT_BRADY_RA_PERCENT_PACED: 69.41 %
MDC_IDC_STAT_BRADY_RV_PERCENT_PACED: 66.93 %
MDC_IDC_STAT_EPISODE_RECENT_COUNT: 0
MDC_IDC_STAT_EPISODE_RECENT_COUNT: 6
MDC_IDC_STAT_EPISODE_RECENT_COUNT_DTM_END: NORMAL
MDC_IDC_STAT_EPISODE_RECENT_COUNT_DTM_START: NORMAL
MDC_IDC_STAT_EPISODE_TOTAL_COUNT: 0
MDC_IDC_STAT_EPISODE_TOTAL_COUNT: 5
MDC_IDC_STAT_EPISODE_TOTAL_COUNT: 66
MDC_IDC_STAT_EPISODE_TOTAL_COUNT_DTM_END: NORMAL
MDC_IDC_STAT_EPISODE_TOTAL_COUNT_DTM_START: NORMAL
MDC_IDC_STAT_EPISODE_TYPE: NORMAL
MDC_IDC_STAT_TACHYTHERAPY_ATP_DELIVERED_RECENT: 0
MDC_IDC_STAT_TACHYTHERAPY_ATP_DELIVERED_TOTAL: 0
MDC_IDC_STAT_TACHYTHERAPY_RECENT_DTM_END: NORMAL
MDC_IDC_STAT_TACHYTHERAPY_RECENT_DTM_START: NORMAL
MDC_IDC_STAT_TACHYTHERAPY_SHOCKS_ABORTED_RECENT: 0
MDC_IDC_STAT_TACHYTHERAPY_SHOCKS_ABORTED_TOTAL: 0
MDC_IDC_STAT_TACHYTHERAPY_SHOCKS_DELIVERED_RECENT: 0
MDC_IDC_STAT_TACHYTHERAPY_SHOCKS_DELIVERED_TOTAL: 0
MDC_IDC_STAT_TACHYTHERAPY_TOTAL_DTM_END: NORMAL
MDC_IDC_STAT_TACHYTHERAPY_TOTAL_DTM_START: NORMAL
MUCOUS THREADS #/AREA URNS LPF: PRESENT /LPF
NITRATE UR QL: NEGATIVE
PH UR STRIP: 5 [PH] (ref 5–7)
POTASSIUM BLD-SCNC: 4.4 MMOL/L (ref 3.4–5.3)
RBC URINE: <1 /HPF
SODIUM SERPL-SCNC: 141 MMOL/L (ref 133–144)
SP GR UR STRIP: 1.02 (ref 1–1.03)
SQUAMOUS EPITHELIAL: <1 /HPF
TOTAL PROTEIN SERUM FOR ELP: 7.6 G/DL (ref 6.8–8.8)
TSH SERPL DL<=0.005 MIU/L-ACNC: 0.64 MU/L (ref 0.4–4)
UROBILINOGEN UR STRIP-MCNC: NORMAL MG/DL
WBC URINE: 1 /HPF

## 2021-11-03 PROCEDURE — 36415 COLL VENOUS BLD VENIPUNCTURE: CPT | Performed by: PATHOLOGY

## 2021-11-03 PROCEDURE — 82550 ASSAY OF CK (CPK): CPT | Mod: 90 | Performed by: PATHOLOGY

## 2021-11-03 PROCEDURE — 84165 PROTEIN E-PHORESIS SERUM: CPT | Mod: 26 | Performed by: PATHOLOGY

## 2021-11-03 PROCEDURE — 81001 URINALYSIS AUTO W/SCOPE: CPT | Performed by: PATHOLOGY

## 2021-11-03 PROCEDURE — 82306 VITAMIN D 25 HYDROXY: CPT | Mod: 90 | Performed by: PATHOLOGY

## 2021-11-03 PROCEDURE — 99215 OFFICE O/P EST HI 40 MIN: CPT | Performed by: INTERNAL MEDICINE

## 2021-11-03 PROCEDURE — 85652 RBC SED RATE AUTOMATED: CPT | Performed by: PATHOLOGY

## 2021-11-03 PROCEDURE — 84443 ASSAY THYROID STIM HORMONE: CPT | Mod: 90 | Performed by: PATHOLOGY

## 2021-11-03 PROCEDURE — 80048 BASIC METABOLIC PNL TOTAL CA: CPT | Mod: 90 | Performed by: PATHOLOGY

## 2021-11-03 PROCEDURE — 84155 ASSAY OF PROTEIN SERUM: CPT | Mod: 90 | Performed by: PATHOLOGY

## 2021-11-03 PROCEDURE — 93283 PRGRMG EVAL IMPLANTABLE DFB: CPT | Performed by: INTERNAL MEDICINE

## 2021-11-03 PROCEDURE — 85610 PROTHROMBIN TIME: CPT | Performed by: PATHOLOGY

## 2021-11-03 PROCEDURE — 99000 SPECIMEN HANDLING OFFICE-LAB: CPT | Performed by: PATHOLOGY

## 2021-11-03 ASSESSMENT — PAIN SCALES - GENERAL: PAINLEVEL: NO PAIN (0)

## 2021-11-03 ASSESSMENT — MIFFLIN-ST. JEOR: SCORE: 1343.97

## 2021-11-03 NOTE — NURSING NOTE
Noe Florence is a 86 year old male patient that presents today in clinic for the following:    Chief Complaint   Patient presents with     RECHECK     The patient's allergies and medications were reviewed as noted. A set of vitals were recorded as noted without incident. The patient does not have any other questions for the provider.    Dariusz Conner, EMT at 2:58 PM on 11/3/2021

## 2021-11-03 NOTE — PATIENT INSTRUCTIONS
It was a pleasure to see you in clinic today.  Please do not hesitate to call with any questions or concerns.  You are scheduled for a remote transmission on 2/2/2022.  We look forward to seeing you in clinic at your next device check in 6 months.    Juanito Woodard, RN  Electrophysiology Nurse Clinician  [facility]    During Business Hours Please Call:  [phone]  After Hours Please Call:  [phone] - select option #4 and ask for job code [phone]

## 2021-11-03 NOTE — PROGRESS NOTES
HPI  86-year-old returns today with his wife for reevaluation.  He has been getting a little stronger.  He is ambulating with the use of a walker is not had any falls and he rarely has any unsteadiness.  He has not been doing his physical therapy exercises regularly in part related to dizziness in the home.  We discussed the potential for getting a  to help him with the exercise regimen.  He is having ongoing problems with urinary incontinence.  This is spontaneous and occurs in the bed or sitting in the chair and is not associated with a lot of urgency.  Since starting the tamsulosin there appears to have been significant improvement in the symptoms.  Otherwise his bowel movements seem to have stabilized and recommended that the use Citrucel to help with the history of explosive bowel movements but they have yet to start this as he has been doing better in this regard recently.  He does complain of some muscle aching worse in the morning and it takes him a while sometimes a few hours to loosen up and feel better.  No specific red hot or swollen joints.  No specific muscle areas that seem to be worse or more problematic than others.  Is been seen by oncology his CEA although elevated is stable and the plan is to reassess this in another 6 months and defer any colonoscopy.  Past Medical History:   Diagnosis Date     Advanced open-angle glaucoma      Atrial fibrillation (H)      CKD (chronic kidney disease), stage III 2005     Colon cancer (H)     Stage II-B colon cancer     Coronary artery disease     s/p CABG x 2, JEREMY x 2     Diverticulitis      Hyperlipidemia      Hypertension      Ischemic cardiomyopathy      MGUS (monoclonal gammopathy of unknown significance)      Nonsenile cataract     BE     Osteoporosis     left hip fracture     Polymorphic ventricular tachycardia (H)      Polymyalgia rheumatica (H)      PVD (posterior vitreous detachment), both eyes 2005     S/P ablation of atrial flutter 6/20/14     CTI     Stented coronary artery      SVT (supraventricular tachycardia) (H)     PPM/AICD for NSVT     Upper leg DVT (deep venous thromboembolism), chronic (H)     Left     Weight loss, unintentional 2017    15 lb in 4 months     Past Surgical History:   Procedure Laterality Date     C CABG, ARTERY-VEIN, FOUR  2006    LIMA-LAD, SVG-Rt PDA, SVG-OM2, SVG-Diag 1     C CABG, VEIN, SINGLE  1994    1-vessel CABG, SVG->PDRCA      CATARACT IOL, RT/LT Bilateral      COLONOSCOPY  3/13/2014    Procedure: COMBINED COLONOSCOPY, SINGLE BIOPSY/POLYPECTOMY BY BIOPSY;;  Surgeon: Mary Gerber MD;  Location:  GI     COLONOSCOPY N/A 6/22/2015    Procedure: COLONOSCOPY;  Surgeon: Marilin Newman MD;  Location:  GI     COLONOSCOPY N/A 11/7/2018    Procedure: COMBINED COLONOSCOPY, SINGLE OR MULTIPLE BIOPSY/POLYPECTOMY BY BIOPSY;  Surgeon: Roberto Esteban MD;  Location:  GI     EP ICD GENERATOR CHANGE DUAL N/A 12/11/2018    Procedure: EP ICD Generator Change Dual;  Surgeon: Deedee Barid MD;  Location:  HEART CARDIAC CATH LAB     H ABLATION ATRIAL FLUTTER       HEART CATH DRUG ELUTING STENT PLACEMENT  4/19/2012    JEREMY x 2 to LCx     IMPLANT IMPLANTABLE CARDIOVERTER DEFIBRILLATOR  5-    ppm/aicd     KNEE SURGERY      right and left knee surgeries     LAPAROSCOPIC ASSISTED COLECTOMY Right 2/1/2019    Procedure: Laparoscopic Converted to Open Transverse Colectomy with Lysis of Adhesions;  Surgeon: Chanelle Guzmán MD;  Location:  OR     LAPAROSCOPIC ASSISTED COLECTOMY LEFT (DESCENDING)  4/8/2014    Procedure: LAPAROSCOPIC ASSISTED COLECTOMY LEFT (DESCENDING);  Hand assisted Laparoscopic Sigmoid Colectomy , Laparoscopic mobilization of spleenic flexure *Latex Allergy*Anesthesia General with Block;  Surgeon: Chanelle Guzmán MD;  Location:  OR     OPEN REDUCTION INTERNAL FIXATION RODDING INTRAMEDULLAR FEMUR FRACTURE TABLE Left 3/14/2019    Procedure: Open Reduction Internal Fixation  Left Femur Intramedullar Nailing;  Surgeon: Srikanth Avalos MD;  Location: [facility] OR     REPAIR VALVE MITRAL  2006     30-mm Medtronic Julian ring      SELECTIVE LASER TRABECULOPLASTY (SLT) OD (RIGHT EYE)  4/10, ,+13    RE     SELECTIVE LASER TRABECULOPLASTY (SLT) OS (LEFT EYE)  2012     SHOULDER SURGERY      right rotator cuff     Family History   Problem Relation Age of Onset     C.A.D. Father      Anesthesia Reaction No family hx of      Crohn's Disease No family hx of      Ulcerative Colitis No family hx of      Cancer - colorectal No family hx of      Macular Degeneration No family hx of      Cancer No family hx of         No known family hx of skin cancer     Melanoma No family hx of      Skin Cancer No family hx of      Social History     Socioeconomic History     Marital status:      Spouse name: Not on file     Number of children: Not on file     Years of education: Not on file     Highest education level: Not on file   Occupational History     Not on file   Tobacco Use     Smoking status: Former Smoker     Types: Cigarettes, Cigars     Quit date: 1968     Years since quittin.8     Smokeless tobacco: Never Used   Substance and Sexual Activity     Alcohol use: Yes     Alcohol/week: 0.0 standard drinks     Comment: 2-3 drinks twice weekly     Drug use: No     Sexual activity: Not on file   Other Topics Concern     Parent/sibling w/ CABG, MI or angioplasty before 65F 55M? Not Asked   Social History Narrative     Not on file     Social Determinants of Health     Financial Resource Strain:      Difficulty of Paying Living Expenses:    Food Insecurity:      Worried About Running Out of Food in the Last Year:      Ran Out of Food in the Last Year:    Transportation Needs:      Lack of Transportation (Medical):      Lack of Transportation (Non-Medical):    Physical Activity:      Days of Exercise per Week:      Minutes of Exercise per Session:    Stress:      Feeling of Stress :   "  Social Connections:      Frequency of Communication with Friends and Family:      Frequency of Social Gatherings with Friends and Family:      Attends Congregation Services:      Active Member of Clubs or Organizations:      Attends Club or Organization Meetings:      Marital Status:    Intimate Partner Violence:      Fear of Current or Ex-Partner:      Emotionally Abused:      Physically Abused:      Sexually Abused:        Exam:  /58 (BP Location: Right arm, Patient Position: Sitting, Cuff Size: Adult Regular)   Pulse 70   Ht 1.702 m (5' 7\")   Wt 70.5 kg (155 lb 8 oz)   SpO2 95%   BMI 24.35 kg/m    155 lbs 8 oz  The patient is alert, oriented with a clear sensorium.   Skin shows no lesions or rashes and good turgor.   Head is normocephalic and atraumatic.    Lungs are clear.   Heart shows normal S1 and S2 without murmur or gallop.    Abdomen is soft and nontender  Scan for postvoid residual average 30 cc  Labs reviewed:  Results for orders placed or performed in visit on 11/03/21   Vitamin D Deficiency     Status: Normal   Result Value Ref Range    Vitamin D, Total (25-Hydroxy) 58 20 - 75 ug/L    Narrative    Season, race, dietary intake, and treatment affect the concentration of 25-hydroxy-Vitamin D. Values may decrease during winter months and increase during summer months. Values 20-29 ug/L may indicate Vitamin D insufficiency and values <20 ug/L may indicate Vitamin D deficiency.    Vitamin D determination is routinely performed by an immunoassay specific for 25 hydroxyvitamin D3.  If an individual is on vitamin D2(ergocalciferol) supplementation, please specify 25 OH vitamin D2 and D3 level determination by LCMSMS test VITD23.     CK total     Status: Normal   Result Value Ref Range    CK 59 30 - 300 U/L   Erythrocyte sedimentation rate auto     Status: Abnormal   Result Value Ref Range    Erythrocyte Sedimentation Rate 42 (H) 0 - 20 mm/hr   TSH with free T4 reflex     Status: Normal   Result Value " Ref Range    TSH 0.64 0.40 - 4.00 mU/L   Basic metabolic panel     Status: Abnormal   Result Value Ref Range    Sodium 141 133 - 144 mmol/L    Potassium 4.4 3.4 - 5.3 mmol/L    Chloride 108 94 - 109 mmol/L    Carbon Dioxide (CO2) 25 20 - 32 mmol/L    Anion Gap 8 3 - 14 mmol/L    Urea Nitrogen 55 (H) 7 - 30 mg/dL    Creatinine 1.65 (H) 0.66 - 1.25 mg/dL    Calcium 9.1 8.5 - 10.1 mg/dL    Glucose 60 (L) 70 - 99 mg/dL    GFR Estimate 37 (L) >60 mL/min/1.73m2   INR     Status: Abnormal   Result Value Ref Range    INR 2.07 (H) 0.85 - 1.15   Total Protein, Serum for ELP     Status: Normal   Result Value Ref Range    Total Protein Serum for ELP 7.6 6.8 - 8.8 g/dL   Protein Electrophoresis, Serum     Status: Abnormal   Result Value Ref Range    Albumin 4.0 3.7 - 5.1 g/dL    Alpha 1 0.4 0.2 - 0.4 g/dL    Alpha 2 0.9 0.5 - 0.9 g/dL    Beta Globulin 1.1 (H) 0.6 - 1.0 g/dL    Gamma Globulin 1.2 0.7 - 1.6 g/dL    Monoclonal Peak 0.1 (H) <=0.0 g/dL    ELP Interpretation       Very small monoclonal protein (about 0.1 g/dL) barely visible in the gamma fraction. Slightly increased beta fration. Pathologic significance requires clinical correlation. AIDA Arevalo M.D., Ph.D., Pathologist ().   Protein electrophoresis     Status: Abnormal    Narrative    The following orders were created for panel order Protein electrophoresis.  Procedure                               Abnormality         Status                     ---------                               -----------         ------                     Total Protein, Serum for...[[id number]]  Normal              Final result               Protein Electrophoresis,...[[id number]]  Abnormal            Final result                 Please view results for these tests on the individual orders.   Results for orders placed or performed in visit on 11/03/21   UA with Micro reflex to Culture     Status: Abnormal    Specimen: Urine, Midstream   Result Value Ref Range    Color Urine  Yellow Colorless, Straw, Light Yellow, Yellow    Appearance Urine Clear Clear    Glucose Urine Negative Negative mg/dL    Bilirubin Urine Negative Negative    Ketones Urine Negative Negative mg/dL    Specific Gravity Urine 1.019 1.003 - 1.035    Blood Urine Negative Negative    pH Urine 5.0 5.0 - 7.0    Protein Albumin Urine Negative Negative mg/dL    Urobilinogen Urine Normal Normal, 2.0 mg/dL    Nitrite Urine Negative Negative    Leukocyte Esterase Urine Negative Negative    Mucus Urine Present (A) None Seen /LPF    RBC Urine <1 <=2 /HPF    WBC Urine 1 <=5 /HPF    Squamous Epithelials Urine <1 <=1 /HPF    Hyaline Casts Urine 6 (H) <=2 /LPF    Narrative    Urine Culture not indicated   Results for orders placed or performed in visit on 11/03/21   Cardiac Device Check - In Clinic (Standing ORD 8 count)     Status: None   Result Value Ref Range    Date Time Interrogation Session [id number]     Implantable Pulse Generator  Medtronic     Implantable Pulse Generator Model [id number] Evera MRI XT DR     Implantable Pulse Generator Serial Number [id number]     Type Interrogation Session In Clinic     Clinic Name [facility]     Implantable Pulse Generator Type Defibrillator     Implantable Pulse Generator Implant Date 20181211     Implantable Lead  Medtronic     Implantable Lead Model 5076 CapSureFix Novus     Implantable Lead Serial Number [id number]     Implantable Lead Implant Date 20120511     Implantable Lead Polarity Type Bipolar Lead     Implantable Lead Location Detail 1 APPENDAGE     Implantable Lead Location Right Atrium     Implantable Lead  Medtronic     Implantable Lead Model 6935 Sprint Quattro Secure S     Implantable Lead Serial Number [id number]     Implantable Lead Implant Date 20120511     Implantable Lead Polarity Type Tripolar Lead     Implantable Lead Location Detail 1 APEX     Implantable Lead Location Right Ventricle     Walker Setting Mode  (NBG Code) MVP_AAIR_DDDR     Walker Setting Lower Rate Limit 60 [beats]/min    Walker Setting Maximum Tracking Rate 130 [beats]/min    Walker Setting Maximum Sensor Rate 130 [beats]/min    Walker Setting Hysterisis Rate DISABLED     Walker Setting OCTAVIA Delay Low 150 ms    Walker Setting PAV Delay Low 180 ms    Walker Setting AT Mode Switch Rate 171 [beats]/min    Lead Channel Setting Sensing Polarity Bipolar     Lead Channel Setting Sensing Anode Location Right Atrium     Lead Channel Setting Sensing Anode Terminal Ring     Lead Channel Setting Sensing Cathode Location Right Atrium     Lead Channel Setting Sensing Cathode Terminal Tip     Lead Channel Setting Sensing Sensitivity 0.3 mV    Lead Channel Setting Sensing Polarity Bipolar     Lead Channel Setting Sensing Anode Location Right Ventricle     Lead Channel Setting Sensing Anode Terminal Ring     Lead Channel Setting Sensing Cathode Location Right Ventricle     Lead Channel Setting Sensing Cathode Terminal Tip     Lead Channel Setting Sensing Sensitivity 0.45 mV    Lead Channel Setting Pacing Polarity Bipolar     Lead Channel Setting Pacing Anode Location Right Atrium     Lead Channel Setting Pacing Anode Terminal Ring     Lead Channel Setting Sensing Cathode Location Right Atrium     Lead Channel Setting Sensing Cathode Terminal Tip     Lead Channel Setting Pacing Pulse Width 0.4 ms    Lead Channel Setting Pacing Amplitude 1.5 V    Lead Channel Setting Pacing Capture Mode Adaptive     Lead Channel Setting Pacing Polarity Bipolar     Lead Channel Setting Pacing Anode Location Right Ventricle     Lead Channel Setting Pacing Anode Terminal Ring     Lead Channel Setting Sensing Cathode Location Right Ventricle     Lead Channel Setting Sensing Cathode Terminal Tip     Lead Channel Setting Pacing Pulse Width 0.4 ms    Lead Channel Setting Pacing Amplitude 2 V    Lead Channel Setting Pacing Capture Mode Adaptive     Zone Setting Type Category VF     Zone Setting Detection  Interval 300 ms    Zone Setting Detection Beats Numerator 18 [beats]    Zone Setting Detection Beats Denominator 24 [beats]    Zone Setting Type Category VT     Zone Setting Detection Interval Blank     Zone Setting Type Category VT     Zone Setting Detection Interval 320 ms    Zone Setting Type Category VT     Zone Setting Detection Interval 430 ms    Zone Setting Type Category ATRIAL_FIBRILLATION     Zone Setting Type Category AT/AF     Zone Setting Detection Interval 350 ms    Lead Channel Impedance Value 456 ohm    Lead Channel Sensing Intrinsic Amplitude 0.6 mV    Lead Channel Pacing Threshold Amplitude 0.5 V    Lead Channel Pacing Threshold Pulse Width 0.4 ms    Lead Channel Impedance Value 361 ohm    Lead Channel Impedance Value 304 ohm    Lead Channel Sensing Intrinsic Amplitude 9.5 mV    Lead Channel Pacing Threshold Amplitude 1.25 V    Lead Channel Pacing Threshold Pulse Width 0.4 ms    Battery Date Time of Measurements 20371533975295     Battery Status OK     Battery RRT Trigger 2.727     Battery Remaining Longevity 79 mo    Battery Voltage 2.95 V    Capacitor Charge Type Reformation     Capacitor Last Charge Date Time [id number]     Capacitor Charge Time 3.813     Capacitor Charge Energy 18 J    Walker Statistic Date Time Start 20210202164247     Walker Statistic Date Time End 20211103140533     Walker Statistic RA Percent Paced 69.41 %    Walker Statistic RV Percent Paced 66.93 %    Walker Statistic AP  Percent 54.55 %    Walker Statistic AS  Percent 13.18 %    Walker Statistic AP VS Percent 20.17 %    Walker Statistic AS VS Percent 12.1 %    Atrial Tachy Statistic Date Time Start 53042751528591     Atrial Tachy Statistic Date Time End 95734603898665     Atrial Tachy Statistic AT/AF [place] Percent 0 %    Therapy Statistic Recent Shocks Delivered 0     Therapy Statistic Recent Shocks Aborted 0     Therapy Statistic Recent ATP Delivered 0     Therapy Statistic Recent Date Time Start 20210202164247      Therapy Statistic Recent Date Time End 66443389848604     Therapy Statistic Total Shocks Delivered 0     Therapy Statistic Total Shocks Aborted 0     Therapy Statistic Total ATP Delivered 0     Therapy Statistic Total  Date Time Start 86958880157711     Therapy Statistic Total  Date Time End 22578793231150     Episode Statistic Recent Count 6     Episode Statistic Type Category AT/AF     Episode Statistic Recent Count 0     Episode Statistic Type Category SVT     Episode Statistic Recent Count 0     Episode Statistic Type Category VT     Episode Statistic Recent Count 0     Episode Statistic Type Category VF     Episode Statistic Recent Count 0     Episode Statistic Type Category VT     Episode Statistic Recent Count 0     Episode Statistic Type Category VT     Episode Statistic Recent Count 0     Episode Statistic Type Category VT     Episode Statistic Recent Date Time Start 91711261579540     Episode Statistic Recent Date Time End 37375336536651     Episode Statistic Recent Date Time Start 20028122557176     Episode Statistic Recent Date Time End 66635449665342     Episode Statistic Recent Date Time Start 69766631228791     Episode Statistic Recent Date Time End 90195217607159     Episode Statistic Recent Date Time Start 08306974592857     Episode Statistic Recent Date Time End 30902501649654     Episode Statistic Recent Date Time Start 50708141267684     Episode Statistic Recent Date Time End 96132648686971     Episode Statistic Recent Date Time Start 54460261957635     Episode Statistic Recent Date Time End 68916232753800     Episode Statistic Recent Date Time Start 94291688954328     Episode Statistic Recent Date Time End 68828312712796     Episode Statistic Total Count 66     Episode Statistic Type Category AT/AF     Episode Statistic Total Count 0     Episode Statistic Type Category SVT     Episode Statistic Total Count 5     Episode Statistic Type Category VT     Episode Statistic Total Count 0     Episode  Statistic Type Category VF     Episode Statistic Total Count 0     Episode Statistic Type Category VT     Episode Statistic Total Count 0     Episode Statistic Type Category VT     Episode Statistic Total Count 0     Episode Statistic Type Category VT     Episode Statistic Total Date Time Start 20181211105337     Episode Statistic Total Date Time End 20211103140533     Episode Statistic Total Date Time Start 20181211105337     Episode Statistic Total Date Time End 02575027538466     Episode Statistic Total Date Time Start 20181211105337     Episode Statistic Total Date Time End 20211103140533     Episode Statistic Total Date Time Start 20181211105337     Episode Statistic Total Date Time End 20211103140533     Episode Statistic Total Date Time Start 20181211105337     Episode Statistic Total Date Time End 20211103140533     Episode Statistic Total Date Time Start 20181211105337     Episode Statistic Total Date Time End 20211103140533     Episode Statistic Total Date Time Start 20181211105337     Episode Statistic Total Date Time End 20211103140533     Episode Identifier 71     Episode Type Category Monitor     Episode Date Time [id number]     Episode Duration 234 s    Narrative    [place] [facility] [place] programming of their [place]   per MD orders.     Device: Medtronic Evera XT   Normal device function  Mode: AAIR/DDDR  bpm  AP: 75%  : 67%  Intrinsic rhythm: SR @ 52 bpm  Thoracic Impedance: Near reference line.   Short V-V intervals: 0  Lead Trends Appear Stable: yes  Estimated battery longevity to RRT = 7 years. Battery voltage = 2.95V.   Atrial Arrhythmia: 6 AT/AF. EGM shows atrial flutter lasting 4 minutes.  AF [place]: 0%  Anticoagulant: warfarin  Ventricular Arrhythmia: 0  Setting Changes: none  Patient has an appointment to see Dr. Sarmiento today.   Plan: Send a remote transmission in 3 months and RTC in 6 months. If   possible patient would like to coordinate device check  in [place] with Dr. Baird appointment.  Juanito ADAMS RN    dual lead ICD    I have reviewed and interpreted the device interrogation, settings,   programming and nurse's summary. The device is functioning within normal   device parameters. I agree with the current findings, assessment and plan.       ASSESSMENT  1 LUTS improved urinary incontinence no evidence of urinary retention  2  myalgias uncertain etiology will investigate  3 history colon cancer S/P laparoscopic resection 2019  4 hypertension well controlled  5 hyperlipidemia  6 renal insufficiency slight improvement  7 anemia secondary to above  8 paroxysmal atrial fibrillation on warfarin  9 peripheral arterial disease  10 monoclonal gammopathy stable  11 Hip fracture with nailing 3/14/2019  12 CAD S/P CABGx2 with JEREMY  13 V Tach S/P ICD  14 osteoporosis on intermittent alendronate  15 venous insufficiency with history  DVT  16 History neuropathy and gait disorder associated with foot drop left leg    Plan  In light of the myalgias in his atorvastatin we will check a CK along with his sed rate thyroid and vitamin D level.  We will follow-up on his MGUS and renal insufficiency.  Encouraged him to get a  to increase his exercise and physical activity.  We will check his UA today.  Over 40 minutes on the day of service spent in chart review, patient contact, record completion and review and notification of lab reports      This note was completed using Dragon voice recognition software.      Roberto Sarmiento MD  General Internal Medicine  [facility]  [phone]

## 2021-11-03 NOTE — PROGRESS NOTES
ANTICOAGULATION MANAGEMENT     Noe Florence 86 year old male is on warfarin with therapeutic INR result. (Goal INR 1.5-2.5)    Recent labs: (last 7 days)     11/03/21  1618   INR 2.07*       ASSESSMENT     Source(s): Chart Review and Patient/Caregiver Call       Warfarin doses taken: Warfarin taken as instructed    Diet: No new diet changes identified    New illness, injury, or hospitalization: No    Medication/supplement changes: None noted    Signs or symptoms of bleeding or clotting: No    Previous INR: Supratherapeutic    Additional findings: None     PLAN     Recommended plan for no diet, medication or health factor changes affecting INR     Dosing Instructions: Continue your current warfarin dose with next INR in 4 weeks       Summary  As of 11/3/2021    Full warfarin instructions:  7.5 mg every Wed; 5 mg all other days   Next INR check:  11/29/2021             Telephone call with  Rica who verbalizes understanding and agrees to plan and who agrees to plan and repeated back plan correctly    Lab visit scheduled    Education provided: None required    Plan made per [facility] anticoagulation protocol    Roya Manning RN  Anticoagulation Clinic  11/3/2021    _______________________________________________________________________     Anticoagulation Episode Summary     Current INR goal:  1.5-2.5   TTR:  91.4 % (1 y)   Target end date:  Indefinite   Send INR reminders to:  [facility]    Indications    Atrial fibrillation (H) [I48.91] [I48.91]  Long-term (current) use of anticoagulants [Z79.01] [Z79.01]  Deep vein thrombosis (DVT) (H) [I82.409]  Atrial fibrillation  unspecified type (H) [I48.91]           Comments:  Patient contact number: [phone] Hx of Falls/Bleed  Can leave results with Rica (friend)  Restarted Warfarin due to DVT.          Anticoagulation Care Providers     Provider Role Specialty Phone number    Frida Desai MD Referring Cardiovascular Disease [phone]

## 2021-11-04 LAB
ALBUMIN SERPL ELPH-MCNC: 4 G/DL (ref 3.7–5.1)
ALPHA1 GLOB SERPL ELPH-MCNC: 0.4 G/DL (ref 0.2–0.4)
ALPHA2 GLOB SERPL ELPH-MCNC: 0.9 G/DL (ref 0.5–0.9)
B-GLOBULIN SERPL ELPH-MCNC: 1.1 G/DL (ref 0.6–1)
DEPRECATED CALCIDIOL+CALCIFEROL SERPL-MC: 58 UG/L (ref 20–75)
GAMMA GLOB SERPL ELPH-MCNC: 1.2 G/DL (ref 0.7–1.6)
M PROTEIN SERPL ELPH-MCNC: 0.1 G/DL
PROT PATTERN SERPL ELPH-IMP: ABNORMAL

## 2021-11-09 ENCOUNTER — TELEPHONE (OUTPATIENT)
Dept: ANTICOAGULATION | Facility: CLINIC | Age: 86
End: 2021-11-09
Payer: COMMERCIAL

## 2021-11-09 ENCOUNTER — OFFICE VISIT (OUTPATIENT)
Dept: OPHTHALMOLOGY | Facility: CLINIC | Age: 86
End: 2021-11-09
Attending: OPHTHALMOLOGY
Payer: COMMERCIAL

## 2021-11-09 DIAGNOSIS — H26.493 PCO (POSTERIOR CAPSULE OPACIFICATION), BILATERAL: ICD-10-CM

## 2021-11-09 DIAGNOSIS — H40.1131 PRIMARY OPEN ANGLE GLAUCOMA (POAG) OF BOTH EYES, MILD STAGE: Primary | ICD-10-CM

## 2021-11-09 DIAGNOSIS — Z96.1 PSEUDOPHAKIA OF BOTH EYES: ICD-10-CM

## 2021-11-09 PROCEDURE — 92004 COMPRE OPH EXAM NEW PT 1/>: CPT | Performed by: OPHTHALMOLOGY

## 2021-11-09 PROCEDURE — 92015 DETERMINE REFRACTIVE STATE: CPT

## 2021-11-09 PROCEDURE — 92083 EXTENDED VISUAL FIELD XM: CPT | Performed by: OPHTHALMOLOGY

## 2021-11-09 PROCEDURE — 92133 CPTRZD OPH DX IMG PST SGM ON: CPT | Performed by: OPHTHALMOLOGY

## 2021-11-09 PROCEDURE — G0463 HOSPITAL OUTPT CLINIC VISIT: HCPCS

## 2021-11-09 ASSESSMENT — SLIT LAMP EXAM - LIDS
COMMENTS: NORMAL
COMMENTS: NORMAL

## 2021-11-09 ASSESSMENT — REFRACTION_MANIFEST
OD_ADD: +2.50
OD_AXIS: 180
OD_SPHERE: +0.50
OS_ADD: +2.50
OS_CYLINDER: +0.50
OS_SPHERE: +0.25
OD_CYLINDER: +0.50
OS_AXIS: 165

## 2021-11-09 ASSESSMENT — CONF VISUAL FIELD: COMMENTS: VF TODAY

## 2021-11-09 ASSESSMENT — CUP TO DISC RATIO
OS_RATIO: 0.4
OD_RATIO: 0.6

## 2021-11-09 ASSESSMENT — VISUAL ACUITY
METHOD: SNELLEN - LINEAR
OD_SC: 20/20
OS_SC: 20/20
OD_SC+: -3

## 2021-11-09 ASSESSMENT — EXTERNAL EXAM - LEFT EYE: OS_EXAM: NORMAL

## 2021-11-09 ASSESSMENT — TONOMETRY
IOP_METHOD: APPLANATION
OD_IOP_MMHG: 12
OS_IOP_MMHG: 11

## 2021-11-09 ASSESSMENT — EXTERNAL EXAM - RIGHT EYE: OD_EXAM: NORMAL

## 2021-11-09 NOTE — TELEPHONE ENCOUNTER
JOSSY Strauss called reporting pt recently started getting red dots on his arms. There are 3 and they are smaller then pinky nail. Pt is not actively bleeding and the spots are not currently growing. Consulted with NASIR Castañeda and will have pt [place] these and continue to monitor these. If they increase in size, multiply, or get painfull then the pt needs to be seen. Rica communicates understanding.

## 2021-11-09 NOTE — PROGRESS NOTES
Chief Complaint(s) and History of Present Illness(es)     Glaucoma Follow-Up     Laterality: both eyes    Associated symptoms: Negative for eye pain, redness and tearing    Pain scale: 0/10              Comments     New patient glaucoma evaluation each eye.  Patient says vision is good each eye.  History of : POAG, pseudophakia each eye   Eye meds: none  FRANCE Martin 11/9/2021 2:20 PM              Review of systems for the eyes was negative other than the pertinent positives/negatives listed in the HPI.      Assessment & Plan      Noe Florence is a 86 year old male with the following diagnoses:   1. Primary open angle glaucoma (POAG) of both eyes, mild stage    2. Pseudophakia of both eyes    3. PCO (posterior capsule opacification), bilateral         Lost to follow up since 2017  S/P cataract extraction c iStent in both eyes   Excellent intraocular pressure in both eyes   OCT Nerve fiber layer with generalized thinning compared to 2017  Visual fields nonspecific (difficult positioning, dryness)    Ok to monitor intraocular pressure without drops  Artificial tears as needed   Doing great with Over the counter readers  posterior capsular opacity (PCO) not visually significant at this time  Return precautions reviewed       Patient disposition:   Return in about 1 year (around 11/9/2022) for DFE.           Attending Physician Attestation:  Complete documentation of historical and exam elements from today's encounter can be found in the full encounter summary report (not reduplicated in this progress note).  I personally obtained the chief complaint(s) and history of present illness.  I confirmed and edited as necessary the review of systems, past medical/surgical history, family history, social history, and examination findings as documented by others; and I examined the patient myself.  I personally reviewed the relevant tests, images, and reports as documented above.  I formulated and edited as necessary  the assessment and plan and discussed the findings and management plan with the patient and family. . - Omero Srinivasan MD

## 2021-11-30 ENCOUNTER — ANTICOAGULATION THERAPY VISIT (OUTPATIENT)
Dept: ANTICOAGULATION | Facility: CLINIC | Age: 86
End: 2021-11-30

## 2021-11-30 ENCOUNTER — LAB (OUTPATIENT)
Dept: LAB | Facility: CLINIC | Age: 86
End: 2021-11-30
Payer: COMMERCIAL

## 2021-11-30 DIAGNOSIS — I48.91 ATRIAL FIBRILLATION (H): Primary | ICD-10-CM

## 2021-11-30 DIAGNOSIS — Z79.01 LONG TERM CURRENT USE OF ANTICOAGULANT THERAPY: ICD-10-CM

## 2021-11-30 DIAGNOSIS — I82.409 DEEP VEIN THROMBOSIS (DVT) (H): ICD-10-CM

## 2021-11-30 DIAGNOSIS — I48.91 ATRIAL FIBRILLATION, UNSPECIFIED TYPE (H): ICD-10-CM

## 2021-11-30 LAB — INR PPP: 3.03 (ref 0.85–1.15)

## 2021-11-30 PROCEDURE — 85610 PROTHROMBIN TIME: CPT | Performed by: PATHOLOGY

## 2021-11-30 PROCEDURE — 36415 COLL VENOUS BLD VENIPUNCTURE: CPT | Performed by: PATHOLOGY

## 2021-11-30 NOTE — PROGRESS NOTES
ANTICOAGULATION MANAGEMENT     Noe Florence 86 year old male is on warfarin with supratherapeutic INR result. (Goal INR 1.5-2.5)    Recent labs: (last 7 days)     11/30/21  1539   INR 3.03*       ASSESSMENT     Source(s): Chart Review and Patient/Caregiver Call       Warfarin doses taken: Warfarin taken differently, but did not change total weekly dose    Diet: Decreased greens/vitamin K in diet; plans to resume previous intake    New illness, injury, or hospitalization: No    Medication/supplement changes: None noted    Signs or symptoms of bleeding or clotting: No    Previous INR: Therapeutic last 2(+) visits    Additional findings: None     PLAN     Recommended plan for no diet, medication or health factor changes affecting INR     Dosing Instructions: Partial hold then continue your current warfarin dose with next INR in 2 weeks       Summary  As of 11/30/2021    Full warfarin instructions:  11/30: 2.5 mg; Otherwise 7.5 mg every Wed; 5 mg all other days   Next INR check:  12/14/2021             Telephone call with  Rica who agrees to plan and repeated back plan correctly    Lab visit scheduled    Education provided: Please call back if any changes to your diet, medications or how you've been taking warfarin    Plan made per [facility] anticoagulation protocol    Ciro Bajwa, RN  Anticoagulation Clinic  11/30/2021    _______________________________________________________________________     Anticoagulation Episode Summary     Current INR goal:  1.5-2.5   TTR:  87.3 % (1 y)   Target end date:  Indefinite   Send INR reminders to:  [facility]    Indications    Atrial fibrillation (H) [I48.91] [I48.91]  Long-term (current) use of anticoagulants [Z79.01] [Z79.01]  Deep vein thrombosis (DVT) (H) [I82.409]  Atrial fibrillation  unspecified type (H) [I48.91]           Comments:  Patient contact number: [phone] Hx of Falls/Bleed  Can leave results with Rica (friend)  Restarted Warfarin due to DVT.           Anticoagulation Care Providers     Provider Role Specialty Phone number    Frida Desai MD Referring Cardiovascular Disease [phone]

## 2021-12-14 ENCOUNTER — ANTICOAGULATION THERAPY VISIT (OUTPATIENT)
Dept: ANTICOAGULATION | Facility: CLINIC | Age: 86
End: 2021-12-14

## 2021-12-14 ENCOUNTER — LAB (OUTPATIENT)
Dept: LAB | Facility: CLINIC | Age: 86
End: 2021-12-14
Payer: COMMERCIAL

## 2021-12-14 DIAGNOSIS — I48.91 ATRIAL FIBRILLATION, UNSPECIFIED TYPE (H): ICD-10-CM

## 2021-12-14 DIAGNOSIS — Z79.01 LONG TERM CURRENT USE OF ANTICOAGULANT THERAPY: ICD-10-CM

## 2021-12-14 DIAGNOSIS — I82.409 DEEP VEIN THROMBOSIS (DVT) (H): ICD-10-CM

## 2021-12-14 DIAGNOSIS — I48.91 ATRIAL FIBRILLATION (H): Primary | ICD-10-CM

## 2021-12-14 LAB — INR PPP: 2.2 (ref 0.85–1.15)

## 2021-12-14 PROCEDURE — 36415 COLL VENOUS BLD VENIPUNCTURE: CPT | Performed by: PATHOLOGY

## 2021-12-14 PROCEDURE — 85610 PROTHROMBIN TIME: CPT | Performed by: PATHOLOGY

## 2021-12-14 NOTE — PROGRESS NOTES
ANTICOAGULATION MANAGEMENT     Noe Florence 86 year old male is on warfarin with therapeutic INR result. (Goal INR 1.5-2.5)    Recent labs: (last 7 days)     12/14/21  1520   INR 2.20*       ASSESSMENT     Source(s): Chart Review and Patient/Caregiver Call       Warfarin doses taken: Warfarin taken as instructed    Diet: No new diet changes identified    New illness, injury, or hospitalization: No    Medication/supplement changes: None noted    Signs or symptoms of bleeding or clotting: No    Previous INR: Supratherapeutic    Additional findings: None     PLAN     Recommended plan for no diet, medication or health factor changes affecting INR     Dosing Instructions: Continue your current warfarin dose with next INR in 2 weeks       Summary  As of 12/14/2021    Full warfarin instructions:  7.5 mg every Wed; 5 mg all other days   Next INR check:  12/28/2021             Telephone call with  Rica who verbalizes understanding and agrees to plan    Lab visit scheduled    Education provided: Goal range and significance of current result    Plan made per [facility] anticoagulation protocol    Bhavana Bess RN  Anticoagulation Clinic  12/14/2021    _______________________________________________________________________     Anticoagulation Episode Summary     Current INR goal:  1.5-2.5   TTR:  84.9 % (1 y)   Target end date:  Indefinite   Send INR reminders to:  [facility]    Indications    Atrial fibrillation (H) [I48.91] [I48.91]  Long-term (current) use of anticoagulants [Z79.01] [Z79.01]  Deep vein thrombosis (DVT) (H) [I82.409]  Atrial fibrillation  unspecified type (H) [I48.91]           Comments:  Patient contact number: [phone] Hx of Falls/Bleed  Can leave results with Rica (friend)  Restarted Warfarin due to DVT.          Anticoagulation Care Providers     Provider Role Specialty Phone number    Frida Desai MD Referring Cardiovascular Disease [phone]

## 2021-12-30 ENCOUNTER — ANTICOAGULATION THERAPY VISIT (OUTPATIENT)
Dept: ANTICOAGULATION | Facility: CLINIC | Age: 86
End: 2021-12-30

## 2021-12-30 ENCOUNTER — LAB (OUTPATIENT)
Dept: LAB | Facility: CLINIC | Age: 86
End: 2021-12-30
Attending: NURSE PRACTITIONER
Payer: COMMERCIAL

## 2021-12-30 DIAGNOSIS — I48.91 ATRIAL FIBRILLATION (H): Primary | ICD-10-CM

## 2021-12-30 DIAGNOSIS — I48.91 ATRIAL FIBRILLATION, UNSPECIFIED TYPE (H): ICD-10-CM

## 2021-12-30 DIAGNOSIS — Z79.01 LONG TERM CURRENT USE OF ANTICOAGULANT THERAPY: ICD-10-CM

## 2021-12-30 DIAGNOSIS — I82.409 DEEP VEIN THROMBOSIS (DVT) (H): ICD-10-CM

## 2021-12-30 LAB — INR PPP: 2.32 (ref 0.85–1.15)

## 2021-12-30 PROCEDURE — 36415 COLL VENOUS BLD VENIPUNCTURE: CPT | Performed by: PATHOLOGY

## 2021-12-30 PROCEDURE — 85610 PROTHROMBIN TIME: CPT | Performed by: PATHOLOGY

## 2021-12-30 NOTE — PROGRESS NOTES
ANTICOAGULATION MANAGEMENT     Noe Florence 86 year old male is on warfarin with therapeutic INR result. (Goal INR 1.5-2.5)    Recent labs: (last 7 days)     12/30/21  1555   INR 2.32*       ASSESSMENT     Source(s): Chart Review and Patient/Caregiver Call       Warfarin doses taken: Warfarin taken as instructed    Diet: No new diet changes identified    New illness, injury, or hospitalization: No    Medication/supplement changes: None noted    Signs or symptoms of bleeding or clotting: No    Previous INR: Therapeutic last visit; previously outside of goal range    Additional findings: None     PLAN     Recommended plan for no diet, medication or health factor changes affecting INR     Dosing Instructions: Continue your current warfarin dose with next INR in 2-3 weeks       Summary  As of 12/30/2021    Full warfarin instructions:  7.5 mg every Wed; 5 mg all other days   Next INR check:  1/20/2022             Telephone call with  Rica who verbalizes understanding and agrees to plan    Lab visit scheduled    Education provided: Goal range and significance of current result    Plan made per [facility] anticoagulation protocol    Kaila Vidales RN  Anticoagulation Clinic  12/30/2021    _______________________________________________________________________     Anticoagulation Episode Summary     Current INR goal:  1.5-2.5   TTR:  84.9 % (1 y)   Target end date:  Indefinite   Send INR reminders to:  [facility]    Indications    Atrial fibrillation (H) [I48.91] [I48.91]  Long-term (current) use of anticoagulants [Z79.01] [Z79.01]  Deep vein thrombosis (DVT) (H) [I82.409]  Atrial fibrillation  unspecified type (H) [I48.91]           Comments:  Hx of Falls/Bleed         Anticoagulation Care Providers     Provider Role Specialty Phone number    Frida Desai MD Referring Cardiovascular Disease [phone]

## 2022-01-18 ENCOUNTER — ANTICOAGULATION THERAPY VISIT (OUTPATIENT)
Dept: ANTICOAGULATION | Facility: CLINIC | Age: 87
End: 2022-01-18

## 2022-01-18 ENCOUNTER — LAB (OUTPATIENT)
Dept: LAB | Facility: CLINIC | Age: 87
End: 2022-01-18
Payer: COMMERCIAL

## 2022-01-18 DIAGNOSIS — I82.409 DEEP VEIN THROMBOSIS (DVT) (H): ICD-10-CM

## 2022-01-18 DIAGNOSIS — I48.91 ATRIAL FIBRILLATION, UNSPECIFIED TYPE (H): ICD-10-CM

## 2022-01-18 DIAGNOSIS — Z79.01 LONG TERM CURRENT USE OF ANTICOAGULANT THERAPY: ICD-10-CM

## 2022-01-18 DIAGNOSIS — I48.91 ATRIAL FIBRILLATION (H): Primary | ICD-10-CM

## 2022-01-18 LAB — INR PPP: 2.35 (ref 0.85–1.15)

## 2022-01-18 PROCEDURE — 36415 COLL VENOUS BLD VENIPUNCTURE: CPT | Performed by: PATHOLOGY

## 2022-01-18 PROCEDURE — 85610 PROTHROMBIN TIME: CPT | Performed by: PATHOLOGY

## 2022-01-18 NOTE — PROGRESS NOTES
ANTICOAGULATION MANAGEMENT     Noe Florence 86 year old male is on warfarin with therapeutic INR result. (Goal INR 1.5-2.5)    Recent labs: (last 7 days)     01/18/22  1509   INR 2.35*       ASSESSMENT     Source(s): Chart Review       Warfarin doses taken: Reviewed in chart    Previous INR: Therapeutic last 2(+) visits    Additional findings: None     PLAN     Recommended plan for no diet, medication or health factor changes affecting INR     Dosing Instructions: Continue your current warfarin dose with next INR in 3 weeks       Summary  As of 1/18/2022    Full warfarin instructions:  7.5 mg every Wed; 5 mg all other days   Next INR check:  2/8/2022             Detailed voice message left for  Rica/Sha with dosing instructions and follow up date.     Contact [phone] to schedule and with any changes, questions or concerns.     Education provided: Please call back if any changes to your diet, medications or how you've been taking warfarin    Plan made per [facility] anticoagulation protocol    Praveen Estrada RN  Anticoagulation Clinic  1/18/2022    _______________________________________________________________________     Anticoagulation Episode Summary     Current INR goal:  1.5-2.5   TTR:  84.9 % (1 y)   Target end date:  Indefinite   Send INR reminders to:  [facility]    Indications    Atrial fibrillation (H) [I48.91] [I48.91]  Long-term (current) use of anticoagulants [Z79.01] [Z79.01]  Deep vein thrombosis (DVT) (H) [I82.409]  Atrial fibrillation  unspecified type (H) [I48.91]           Comments:  Hx of Falls/Bleed         Anticoagulation Care Providers     Provider Role Specialty Phone number    Frida Desai MD Referring Cardiovascular Disease [phone]

## 2022-02-02 ENCOUNTER — ANCILLARY PROCEDURE (OUTPATIENT)
Dept: CARDIOLOGY | Facility: CLINIC | Age: 87
End: 2022-02-02
Attending: INTERNAL MEDICINE
Payer: COMMERCIAL

## 2022-02-02 DIAGNOSIS — I48.0 PAROXYSMAL ATRIAL FIBRILLATION (H): ICD-10-CM

## 2022-02-02 DIAGNOSIS — I47.20 VENTRICULAR TACHYARRHYTHMIA (H): ICD-10-CM

## 2022-02-02 DIAGNOSIS — Z95.810 ICD (IMPLANTABLE CARDIOVERTER-DEFIBRILLATOR) IN PLACE: ICD-10-CM

## 2022-02-02 PROCEDURE — 93295 DEV INTERROG REMOTE 1/2/MLT: CPT | Performed by: INTERNAL MEDICINE

## 2022-02-02 PROCEDURE — 93296 REM INTERROG EVL PM/IDS: CPT

## 2022-02-10 ENCOUNTER — ANTICOAGULATION THERAPY VISIT (OUTPATIENT)
Dept: ANTICOAGULATION | Facility: CLINIC | Age: 87
End: 2022-02-10

## 2022-02-10 ENCOUNTER — LAB (OUTPATIENT)
Dept: LAB | Facility: CLINIC | Age: 87
End: 2022-02-10
Payer: COMMERCIAL

## 2022-02-10 DIAGNOSIS — Z79.01 LONG TERM CURRENT USE OF ANTICOAGULANT THERAPY: ICD-10-CM

## 2022-02-10 DIAGNOSIS — I82.409 DEEP VEIN THROMBOSIS (DVT) (H): ICD-10-CM

## 2022-02-10 DIAGNOSIS — I48.91 ATRIAL FIBRILLATION (H): Primary | ICD-10-CM

## 2022-02-10 DIAGNOSIS — I48.91 ATRIAL FIBRILLATION, UNSPECIFIED TYPE (H): ICD-10-CM

## 2022-02-10 LAB — INR PPP: 1.99 (ref 0.85–1.15)

## 2022-02-10 PROCEDURE — 36415 COLL VENOUS BLD VENIPUNCTURE: CPT | Performed by: PATHOLOGY

## 2022-02-10 PROCEDURE — 85610 PROTHROMBIN TIME: CPT | Performed by: PATHOLOGY

## 2022-02-19 ENCOUNTER — HEALTH MAINTENANCE LETTER (OUTPATIENT)
Age: 87
End: 2022-02-19

## 2022-03-08 LAB
MDC_IDC_LEAD_IMPLANT_DT: NORMAL
MDC_IDC_LEAD_IMPLANT_DT: NORMAL
MDC_IDC_LEAD_LOCATION: NORMAL
MDC_IDC_LEAD_LOCATION: NORMAL
MDC_IDC_LEAD_LOCATION_DETAIL_1: NORMAL
MDC_IDC_LEAD_LOCATION_DETAIL_1: NORMAL
MDC_IDC_LEAD_MFG: NORMAL
MDC_IDC_LEAD_MFG: NORMAL
MDC_IDC_LEAD_MODEL: NORMAL
MDC_IDC_LEAD_MODEL: NORMAL
MDC_IDC_LEAD_POLARITY_TYPE: NORMAL
MDC_IDC_LEAD_POLARITY_TYPE: NORMAL
MDC_IDC_LEAD_SERIAL: NORMAL
MDC_IDC_LEAD_SERIAL: NORMAL
MDC_IDC_MSMT_BATTERY_DTM: NORMAL
MDC_IDC_MSMT_BATTERY_REMAINING_LONGEVITY: 74 MO
MDC_IDC_MSMT_BATTERY_RRT_TRIGGER: 2.73
MDC_IDC_MSMT_BATTERY_STATUS: NORMAL
MDC_IDC_MSMT_BATTERY_VOLTAGE: 2.93 V
MDC_IDC_MSMT_CAP_CHARGE_DTM: NORMAL
MDC_IDC_MSMT_CAP_CHARGE_ENERGY: 18 J
MDC_IDC_MSMT_CAP_CHARGE_TIME: 3.8
MDC_IDC_MSMT_CAP_CHARGE_TYPE: NORMAL
MDC_IDC_MSMT_LEADCHNL_RA_IMPEDANCE_VALUE: 456 OHM
MDC_IDC_MSMT_LEADCHNL_RA_PACING_THRESHOLD_AMPLITUDE: 0.62 V
MDC_IDC_MSMT_LEADCHNL_RA_PACING_THRESHOLD_PULSEWIDTH: 0.4 MS
MDC_IDC_MSMT_LEADCHNL_RA_SENSING_INTR_AMPL: 1.12 MV
MDC_IDC_MSMT_LEADCHNL_RA_SENSING_INTR_AMPL: 1.12 MV
MDC_IDC_MSMT_LEADCHNL_RV_IMPEDANCE_VALUE: 285 OHM
MDC_IDC_MSMT_LEADCHNL_RV_IMPEDANCE_VALUE: 361 OHM
MDC_IDC_MSMT_LEADCHNL_RV_PACING_THRESHOLD_AMPLITUDE: 0.75 V
MDC_IDC_MSMT_LEADCHNL_RV_PACING_THRESHOLD_PULSEWIDTH: 0.4 MS
MDC_IDC_MSMT_LEADCHNL_RV_SENSING_INTR_AMPL: 7.25 MV
MDC_IDC_MSMT_LEADCHNL_RV_SENSING_INTR_AMPL: 7.25 MV
MDC_IDC_PG_IMPLANT_DTM: NORMAL
MDC_IDC_PG_MFG: NORMAL
MDC_IDC_PG_MODEL: NORMAL
MDC_IDC_PG_SERIAL: NORMAL
MDC_IDC_PG_TYPE: NORMAL
MDC_IDC_SESS_CLINIC_NAME: NORMAL
MDC_IDC_SESS_DTM: NORMAL
MDC_IDC_SESS_TYPE: NORMAL
MDC_IDC_SET_BRADY_AT_MODE_SWITCH_RATE: 171 {BEATS}/MIN
MDC_IDC_SET_BRADY_HYSTRATE: NORMAL
MDC_IDC_SET_BRADY_LOWRATE: 60 {BEATS}/MIN
MDC_IDC_SET_BRADY_MAX_SENSOR_RATE: 130 {BEATS}/MIN
MDC_IDC_SET_BRADY_MAX_TRACKING_RATE: 130 {BEATS}/MIN
MDC_IDC_SET_BRADY_MODE: NORMAL
MDC_IDC_SET_BRADY_PAV_DELAY_LOW: 180 MS
MDC_IDC_SET_BRADY_SAV_DELAY_LOW: 150 MS
MDC_IDC_SET_LEADCHNL_RA_PACING_AMPLITUDE: 1.5 V
MDC_IDC_SET_LEADCHNL_RA_PACING_ANODE_ELECTRODE_1: NORMAL
MDC_IDC_SET_LEADCHNL_RA_PACING_ANODE_LOCATION_1: NORMAL
MDC_IDC_SET_LEADCHNL_RA_PACING_CAPTURE_MODE: NORMAL
MDC_IDC_SET_LEADCHNL_RA_PACING_CATHODE_ELECTRODE_1: NORMAL
MDC_IDC_SET_LEADCHNL_RA_PACING_CATHODE_LOCATION_1: NORMAL
MDC_IDC_SET_LEADCHNL_RA_PACING_POLARITY: NORMAL
MDC_IDC_SET_LEADCHNL_RA_PACING_PULSEWIDTH: 0.4 MS
MDC_IDC_SET_LEADCHNL_RA_SENSING_ANODE_ELECTRODE_1: NORMAL
MDC_IDC_SET_LEADCHNL_RA_SENSING_ANODE_LOCATION_1: NORMAL
MDC_IDC_SET_LEADCHNL_RA_SENSING_CATHODE_ELECTRODE_1: NORMAL
MDC_IDC_SET_LEADCHNL_RA_SENSING_CATHODE_LOCATION_1: NORMAL
MDC_IDC_SET_LEADCHNL_RA_SENSING_POLARITY: NORMAL
MDC_IDC_SET_LEADCHNL_RA_SENSING_SENSITIVITY: 0.3 MV
MDC_IDC_SET_LEADCHNL_RV_PACING_AMPLITUDE: 2 V
MDC_IDC_SET_LEADCHNL_RV_PACING_ANODE_ELECTRODE_1: NORMAL
MDC_IDC_SET_LEADCHNL_RV_PACING_ANODE_LOCATION_1: NORMAL
MDC_IDC_SET_LEADCHNL_RV_PACING_CAPTURE_MODE: NORMAL
MDC_IDC_SET_LEADCHNL_RV_PACING_CATHODE_ELECTRODE_1: NORMAL
MDC_IDC_SET_LEADCHNL_RV_PACING_CATHODE_LOCATION_1: NORMAL
MDC_IDC_SET_LEADCHNL_RV_PACING_POLARITY: NORMAL
MDC_IDC_SET_LEADCHNL_RV_PACING_PULSEWIDTH: 0.4 MS
MDC_IDC_SET_LEADCHNL_RV_SENSING_ANODE_ELECTRODE_1: NORMAL
MDC_IDC_SET_LEADCHNL_RV_SENSING_ANODE_LOCATION_1: NORMAL
MDC_IDC_SET_LEADCHNL_RV_SENSING_CATHODE_ELECTRODE_1: NORMAL
MDC_IDC_SET_LEADCHNL_RV_SENSING_CATHODE_LOCATION_1: NORMAL
MDC_IDC_SET_LEADCHNL_RV_SENSING_POLARITY: NORMAL
MDC_IDC_SET_LEADCHNL_RV_SENSING_SENSITIVITY: 0.45 MV
MDC_IDC_SET_ZONE_DETECTION_BEATS_DENOMINATOR: 24 {BEATS}
MDC_IDC_SET_ZONE_DETECTION_BEATS_NUMERATOR: 18 {BEATS}
MDC_IDC_SET_ZONE_DETECTION_INTERVAL: 300 MS
MDC_IDC_SET_ZONE_DETECTION_INTERVAL: 320 MS
MDC_IDC_SET_ZONE_DETECTION_INTERVAL: 350 MS
MDC_IDC_SET_ZONE_DETECTION_INTERVAL: 430 MS
MDC_IDC_SET_ZONE_DETECTION_INTERVAL: NORMAL
MDC_IDC_SET_ZONE_TYPE: NORMAL
MDC_IDC_STAT_AT_BURDEN_PERCENT: 0 %
MDC_IDC_STAT_AT_DTM_END: NORMAL
MDC_IDC_STAT_AT_DTM_START: NORMAL
MDC_IDC_STAT_BRADY_AP_VP_PERCENT: 48.1 %
MDC_IDC_STAT_BRADY_AP_VS_PERCENT: 22.85 %
MDC_IDC_STAT_BRADY_AS_VP_PERCENT: 17.53 %
MDC_IDC_STAT_BRADY_AS_VS_PERCENT: 11.52 %
MDC_IDC_STAT_BRADY_DTM_END: NORMAL
MDC_IDC_STAT_BRADY_DTM_START: NORMAL
MDC_IDC_STAT_BRADY_RA_PERCENT_PACED: 70.36 %
MDC_IDC_STAT_BRADY_RV_PERCENT_PACED: 65.04 %
MDC_IDC_STAT_EPISODE_RECENT_COUNT: 0
MDC_IDC_STAT_EPISODE_RECENT_COUNT_DTM_END: NORMAL
MDC_IDC_STAT_EPISODE_RECENT_COUNT_DTM_START: NORMAL
MDC_IDC_STAT_EPISODE_TOTAL_COUNT: 0
MDC_IDC_STAT_EPISODE_TOTAL_COUNT: 5
MDC_IDC_STAT_EPISODE_TOTAL_COUNT: 66
MDC_IDC_STAT_EPISODE_TOTAL_COUNT_DTM_END: NORMAL
MDC_IDC_STAT_EPISODE_TOTAL_COUNT_DTM_START: NORMAL
MDC_IDC_STAT_EPISODE_TYPE: NORMAL
MDC_IDC_STAT_TACHYTHERAPY_ATP_DELIVERED_RECENT: 0
MDC_IDC_STAT_TACHYTHERAPY_ATP_DELIVERED_TOTAL: 0
MDC_IDC_STAT_TACHYTHERAPY_RECENT_DTM_END: NORMAL
MDC_IDC_STAT_TACHYTHERAPY_RECENT_DTM_START: NORMAL
MDC_IDC_STAT_TACHYTHERAPY_SHOCKS_ABORTED_RECENT: 0
MDC_IDC_STAT_TACHYTHERAPY_SHOCKS_ABORTED_TOTAL: 0
MDC_IDC_STAT_TACHYTHERAPY_SHOCKS_DELIVERED_RECENT: 0
MDC_IDC_STAT_TACHYTHERAPY_SHOCKS_DELIVERED_TOTAL: 0
MDC_IDC_STAT_TACHYTHERAPY_TOTAL_DTM_END: NORMAL
MDC_IDC_STAT_TACHYTHERAPY_TOTAL_DTM_START: NORMAL

## 2022-03-10 ENCOUNTER — TELEPHONE (OUTPATIENT)
Dept: CARDIOLOGY | Facility: CLINIC | Age: 87
End: 2022-03-10
Payer: COMMERCIAL

## 2022-03-10 NOTE — TELEPHONE ENCOUNTER
Patient has been seen in cardiology for multiple issues in the past. He has established care with Dr. Deedee Baird for his VT and ICD implant.  He use to see Dr. Santos for his PAD and CAD. However, Dr. Santos is not seeing patients at present time.  His wife would like Bill to see someone especially for his vascular issues. Will route to vascular medicine to see if they feel he is an appropriate patient.    Hx of DVT, venous incompetency, PAD.

## 2022-03-10 NOTE — TELEPHONE ENCOUNTER
PROVIDER UNAVAILABLE   03/10 - talked to pts wife and they are requesting he sees and MD at this point. Msg sent to Anais to see if there is a particular provider he should see.

## 2022-03-11 ENCOUNTER — TELEPHONE (OUTPATIENT)
Dept: ANTICOAGULATION | Facility: CLINIC | Age: 87
End: 2022-03-11
Payer: COMMERCIAL

## 2022-03-11 DIAGNOSIS — I48.91 ATRIAL FIBRILLATION, UNSPECIFIED TYPE (H): ICD-10-CM

## 2022-03-11 DIAGNOSIS — Z79.01 LONG TERM CURRENT USE OF ANTICOAGULANT THERAPY: ICD-10-CM

## 2022-03-11 DIAGNOSIS — I82.409 DEEP VEIN THROMBOSIS (DVT) (H): ICD-10-CM

## 2022-03-11 DIAGNOSIS — I48.91 ATRIAL FIBRILLATION (H): Primary | ICD-10-CM

## 2022-03-11 NOTE — TELEPHONE ENCOUNTER
Rica calling to report Sha missed a 5 mg dose of warfarin on 3/10/22.  He will take 7.5 mg of warfarin today, then continue with his maintenance dose.  He has an INR lab appt scheduled 3/16/22.  Danya Edwards RN

## 2022-03-16 ENCOUNTER — ANTICOAGULATION THERAPY VISIT (OUTPATIENT)
Dept: ANTICOAGULATION | Facility: CLINIC | Age: 87
End: 2022-03-16

## 2022-03-16 ENCOUNTER — TELEPHONE (OUTPATIENT)
Dept: ANTICOAGULATION | Facility: CLINIC | Age: 87
End: 2022-03-16
Payer: COMMERCIAL

## 2022-03-16 ENCOUNTER — LAB (OUTPATIENT)
Dept: LAB | Facility: CLINIC | Age: 87
End: 2022-03-16
Attending: INTERNAL MEDICINE
Payer: COMMERCIAL

## 2022-03-16 DIAGNOSIS — Z79.01 LONG TERM CURRENT USE OF ANTICOAGULANT THERAPY: ICD-10-CM

## 2022-03-16 DIAGNOSIS — I48.91 ATRIAL FIBRILLATION, UNSPECIFIED TYPE (H): ICD-10-CM

## 2022-03-16 DIAGNOSIS — R97.8 ELEVATED TUMOR MARKERS: ICD-10-CM

## 2022-03-16 DIAGNOSIS — I48.91 ATRIAL FIBRILLATION (H): Primary | ICD-10-CM

## 2022-03-16 DIAGNOSIS — I82.409 DEEP VEIN THROMBOSIS (DVT) (H): Primary | ICD-10-CM

## 2022-03-16 DIAGNOSIS — I82.409 DEEP VEIN THROMBOSIS (DVT) (H): ICD-10-CM

## 2022-03-16 DIAGNOSIS — I48.91 ATRIAL FIBRILLATION (H): ICD-10-CM

## 2022-03-16 DIAGNOSIS — C18.9 MALIGNANT NEOPLASM OF COLON, UNSPECIFIED PART OF COLON (H): ICD-10-CM

## 2022-03-16 LAB
ALBUMIN SERPL-MCNC: 3.4 G/DL (ref 3.4–5)
ALP SERPL-CCNC: 99 U/L (ref 40–150)
ALT SERPL W P-5'-P-CCNC: 25 U/L (ref 0–70)
ANION GAP SERPL CALCULATED.3IONS-SCNC: 9 MMOL/L (ref 3–14)
AST SERPL W P-5'-P-CCNC: 18 U/L (ref 0–45)
BASOPHILS # BLD AUTO: 0 10E3/UL (ref 0–0.2)
BASOPHILS NFR BLD AUTO: 0 %
BILIRUB SERPL-MCNC: 0.3 MG/DL (ref 0.2–1.3)
BUN SERPL-MCNC: 56 MG/DL (ref 7–30)
CALCIUM SERPL-MCNC: 8.5 MG/DL (ref 8.5–10.1)
CEA SERPL-MCNC: 5.3 UG/L (ref 0–2.5)
CHLORIDE BLD-SCNC: 113 MMOL/L (ref 94–109)
CO2 SERPL-SCNC: 22 MMOL/L (ref 20–32)
CREAT SERPL-MCNC: 1.54 MG/DL (ref 0.66–1.25)
EOSINOPHIL # BLD AUTO: 0.2 10E3/UL (ref 0–0.7)
EOSINOPHIL NFR BLD AUTO: 3 %
ERYTHROCYTE [DISTWIDTH] IN BLOOD BY AUTOMATED COUNT: 14.2 % (ref 10–15)
GFR SERPL CREATININE-BSD FRML MDRD: 44 ML/MIN/1.73M2
GLUCOSE BLD-MCNC: 132 MG/DL (ref 70–99)
HCT VFR BLD AUTO: 33.2 % (ref 40–53)
HGB BLD-MCNC: 10.5 G/DL (ref 13.3–17.7)
IMM GRANULOCYTES # BLD: 0 10E3/UL
IMM GRANULOCYTES NFR BLD: 0 %
INR PPP: 2.65 (ref 0.85–1.15)
LYMPHOCYTES # BLD AUTO: 0.8 10E3/UL (ref 0.8–5.3)
LYMPHOCYTES NFR BLD AUTO: 12 %
MCH RBC QN AUTO: 30.4 PG (ref 26.5–33)
MCHC RBC AUTO-ENTMCNC: 31.6 G/DL (ref 31.5–36.5)
MCV RBC AUTO: 96 FL (ref 78–100)
MONOCYTES # BLD AUTO: 0.7 10E3/UL (ref 0–1.3)
MONOCYTES NFR BLD AUTO: 10 %
NEUTROPHILS # BLD AUTO: 5.2 10E3/UL (ref 1.6–8.3)
NEUTROPHILS NFR BLD AUTO: 75 %
NRBC # BLD AUTO: 0 10E3/UL
NRBC BLD AUTO-RTO: 0 /100
PLATELET # BLD AUTO: 150 10E3/UL (ref 150–450)
POTASSIUM BLD-SCNC: 4.6 MMOL/L (ref 3.4–5.3)
PROT SERPL-MCNC: 7.3 G/DL (ref 6.8–8.8)
RBC # BLD AUTO: 3.45 10E6/UL (ref 4.4–5.9)
SODIUM SERPL-SCNC: 144 MMOL/L (ref 133–144)
WBC # BLD AUTO: 6.9 10E3/UL (ref 4–11)

## 2022-03-16 PROCEDURE — 99000 SPECIMEN HANDLING OFFICE-LAB: CPT | Performed by: PATHOLOGY

## 2022-03-16 PROCEDURE — 85025 COMPLETE CBC W/AUTO DIFF WBC: CPT | Performed by: PATHOLOGY

## 2022-03-16 PROCEDURE — 85610 PROTHROMBIN TIME: CPT | Performed by: PATHOLOGY

## 2022-03-16 PROCEDURE — 82378 CARCINOEMBRYONIC ANTIGEN: CPT | Mod: 90 | Performed by: PATHOLOGY

## 2022-03-16 PROCEDURE — 36415 COLL VENOUS BLD VENIPUNCTURE: CPT | Performed by: PATHOLOGY

## 2022-03-16 PROCEDURE — 80053 COMPREHEN METABOLIC PANEL: CPT | Performed by: PATHOLOGY

## 2022-03-16 NOTE — PROGRESS NOTES
ANTICOAGULATION MANAGEMENT     Noe Florence 86 year old male is on warfarin with supratherapeutic INR result. (Goal INR 1.5-2.5)    Recent labs: (last 7 days)     03/16/22  1521   INR 2.65*       ASSESSMENT       Source(s): Chart Review and Patient/Caregiver Call       Warfarin doses taken: Missed dose(s) may be affecting INR    Diet: Decreased greens/vitamin K in diet; plans to resume previous intake    New illness, injury, or hospitalization: No    Medication/supplement changes: None noted    Signs or symptoms of bleeding or clotting: No    Previous INR: Therapeutic last 2(+) visits    Additional findings: None       PLAN     Recommended plan for no diet, medication or health factor changes affecting INR     Dosing Instructions: Continue your current warfarin dose with next INR in 2 weeks.  Left warfarin dosing the same, just switched days for the 7.5 mg dose.       Summary  As of 3/16/2022    Full warfarin instructions:  7.5 mg every Mon; 5 mg all other days   Next INR check:  3/30/2022             Telephone call with  friend, Rica who agrees to plan and repeated back plan correctly    Lab visit scheduled    Education provided: Please call back if any changes to your diet, medications or how you've been taking warfarin and Contact [phone] with any changes, questions or concerns.     Plan made per [facility] anticoagulation protocol    Danya Edwards RN  Anticoagulation Clinic  3/16/2022    _______________________________________________________________________     Anticoagulation Episode Summary     Current INR goal:  1.5-2.5   TTR:  82.8 % (1 y)   Target end date:  Indefinite   Send INR reminders to:  [facility]    Indications    Atrial fibrillation (H) [I48.91] [I48.91]  Long-term (current) use of anticoagulants [Z79.01] [Z79.01]  Deep vein thrombosis (DVT) (H) [I82.409]  Atrial fibrillation  unspecified type (H) [I48.91]           Comments:  Hx of Falls/Bleed         Anticoagulation Care  Providers     Provider Role Specialty Phone number    Frida Desai MD Referring Cardiovascular Disease [phone]

## 2022-03-21 ENCOUNTER — VIRTUAL VISIT (OUTPATIENT)
Dept: ONCOLOGY | Facility: CLINIC | Age: 87
End: 2022-03-21
Attending: NURSE PRACTITIONER
Payer: COMMERCIAL

## 2022-03-21 DIAGNOSIS — R97.8 ELEVATED TUMOR MARKERS: ICD-10-CM

## 2022-03-21 DIAGNOSIS — C18.9 MALIGNANT NEOPLASM OF COLON, UNSPECIFIED PART OF COLON (H): Primary | ICD-10-CM

## 2022-03-21 PROCEDURE — G0463 HOSPITAL OUTPT CLINIC VISIT: HCPCS | Mod: PN,RTG | Performed by: INTERNAL MEDICINE

## 2022-03-21 PROCEDURE — 99214 OFFICE O/P EST MOD 30 MIN: CPT | Mod: 95 | Performed by: INTERNAL MEDICINE

## 2022-03-21 NOTE — LETTER
3/21/2022         RE: Noe Florence  [address] [facility] [address]        Dear Colleague,    Thank you for referring your patient, Noe Florence, to the [facility]. Please see a copy of my visit note below.    Sha is a 86 year old who is being evaluated via a billable video visit.      How would you like to obtain your AVS? Mail a copy  If the video visit is dropped, the invitation should be resent by: Send to e-mail at: [email or web address]  Will anyone else be joining your video visit? No      Annette Coronado VF    Video Start Time: 2:00  Video-Visit Details    Type of service:  Video Visit    Video End Time:2:20    Originating Location (pt. Location): Home    Distant Location (provider location):  [facility]     Platform used for Video Visit: Mireya Florence is here today in follow-up of colon cancer.    He is 86 years old and has a history of 2 separate cancers.  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  He has not had any adjuvant treatment.  He returns today for ongoing surveillance.  He tells me his biggest problem is ongoing issues with mobility related to his prior injuries and he still has fairly limited activity.  He ambulates with the aid of a walker.  He does get out of the house to go to a concert and frequently to go to clinic visits.  He eats well and his weight is stable.  He had been having problems with constipation and very hard and very large stools.  He is altered his diet so is not taking his much high-fiber cereal but in increasing the amount of vegetables and added Citrucel which seems to resolve those issues.  He does not like to drink much fluids because he has some minor problems with urinary incontinence and does not want to have to urinate any more than necessary.  He is not aware of any recent active problems with his  heart and is currently working to reestablish care for his vascular disease.    On exam via the video link he appears chronically ill with a paucity of movements.  He has no icterus.  He has no respiratory distress.      His lab work shows his chronic renal failure to be stable with a an elevated chloride and disproportionately elevated BUN consistent with volume depletion.  His liver enzymes are normal he has mild normocytic anemia and otherwise normal blood counts.  His CEA level of 5.4 is stable.    Assessment/plan: Resected colon cancer x2, currently without evidence of disease.  His CEA level remains mildly elevated as always for obscure reasons and I do not think represents recurrence of his cancer unless it goes up significantly more.  He has a mild risk of recurrence from his most recent cancer but also has multiple comorbidities which would mitigate the value of early finding of a recurrence.  We will plan on seeing him once every 6 months with labs in the clinic visit and defer further imaging unless he has clinical evidence of recurrence.  2.  Chronic renal failure.  I encouraged him to increase his oral intake.          Again, thank you for allowing me to participate in the care of your patient.      Sincerely,    Francisco Gilliam MD

## 2022-03-21 NOTE — PROGRESS NOTES
Sha is a 86 year old who is being evaluated via a billable video visit.      How would you like to obtain your AVS? Mail a copy  If the video visit is dropped, the invitation should be resent by: Send to e-mail at: [email or web address]  Will anyone else be joining your video visit? No      Annette Coronado VF    Video Start Time: 2:00  Video-Visit Details    Type of service:  Video Visit    Video End Time:2:20    Originating Location (pt. Location): Home    Distant Location (provider location):  [facility]     Platform used for Video Visit: Mireya         Sha Florence is here today in follow-up of colon cancer.    He is 86 years old and has a history of 2 separate cancers.  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  He has not had any adjuvant treatment.  He returns today for ongoing surveillance.  He tells me his biggest problem is ongoing issues with mobility related to his prior injuries and he still has fairly limited activity.  He ambulates with the aid of a walker.  He does get out of the house to go to a concert and frequently to go to clinic visits.  He eats well and his weight is stable.  He had been having problems with constipation and very hard and very large stools.  He is altered his diet so is not taking his much high-fiber cereal but in increasing the amount of vegetables and added Citrucel which seems to resolve those issues.  He does not like to drink much fluids because he has some minor problems with urinary incontinence and does not want to have to urinate any more than necessary.  He is not aware of any recent active problems with his heart and is currently working to reestablish care for his vascular disease.    On exam via the video link he appears chronically ill with a paucity of movements.  He has no icterus.  He has no respiratory distress.      His lab work shows his chronic renal  failure to be stable with a an elevated chloride and disproportionately elevated BUN consistent with volume depletion.  His liver enzymes are normal he has mild normocytic anemia and otherwise normal blood counts.  His CEA level of 5.4 is stable.    Assessment/plan: Resected colon cancer x2, currently without evidence of disease.  His CEA level remains mildly elevated as always for obscure reasons and I do not think represents recurrence of his cancer unless it goes up significantly more.  He has a mild risk of recurrence from his most recent cancer but also has multiple comorbidities which would mitigate the value of early finding of a recurrence.  We will plan on seeing him once every 6 months with labs in the clinic visit and defer further imaging unless he has clinical evidence of recurrence.  2.  Chronic renal failure.  I encouraged him to increase his oral intake.

## 2022-03-23 ENCOUNTER — TELEPHONE (OUTPATIENT)
Dept: VASCULAR SURGERY | Facility: CLINIC | Age: 87
End: 2022-03-23
Payer: COMMERCIAL

## 2022-03-23 NOTE — TELEPHONE ENCOUNTER
Vascular Clinic Appointment Request    Imaging needed? No.     Visit type: in person visit    Provider: Dr. Dominguez    Timeframe: Pt's convenience    Appt notes: Annual follow-up for PVD; previously followed by Dr. Santos    Additional info: None    Documentation routed to clinic coordinators for scheduling.      LUANN CarbajalN, RN  [facility] - P Vascular Surgery  Ph: [phone]  Fax: [phone]

## 2022-03-29 ENCOUNTER — LAB (OUTPATIENT)
Dept: LAB | Facility: CLINIC | Age: 87
End: 2022-03-29
Attending: INTERNAL MEDICINE
Payer: COMMERCIAL

## 2022-03-29 ENCOUNTER — ANTICOAGULATION THERAPY VISIT (OUTPATIENT)
Dept: ANTICOAGULATION | Facility: CLINIC | Age: 87
End: 2022-03-29

## 2022-03-29 DIAGNOSIS — Z79.01 LONG TERM CURRENT USE OF ANTICOAGULANT THERAPY: ICD-10-CM

## 2022-03-29 DIAGNOSIS — I48.91 ATRIAL FIBRILLATION (H): Primary | ICD-10-CM

## 2022-03-29 DIAGNOSIS — I82.409 DEEP VEIN THROMBOSIS (DVT) (H): ICD-10-CM

## 2022-03-29 DIAGNOSIS — I48.91 ATRIAL FIBRILLATION, UNSPECIFIED TYPE (H): ICD-10-CM

## 2022-03-29 DIAGNOSIS — I48.91 ATRIAL FIBRILLATION (H): ICD-10-CM

## 2022-03-29 LAB — INR BLD: 2.4 (ref 0.9–1.1)

## 2022-03-29 NOTE — PROGRESS NOTES
ANTICOAGULATION MANAGEMENT     Noe Florence 86 year old male is on warfarin with therapeutic INR result. (Goal INR 1.5-2.5)    Recent labs: (last 7 days)     03/29/22  1629   INR 2.4*       ASSESSMENT       Source(s): Chart Review and Patient/Caregiver Call       Warfarin doses taken: Warfarin taken as instructed    Diet: No new diet changes identified    New illness, injury, or hospitalization: Yes: Recent fall, watching bruise on leg.    Medication/supplement changes: None noted    Signs or symptoms of bleeding or clotting: No    Previous INR: Therapeutic last 2(+) visits    Additional findings: Spoke to Rica.  With extra stressors, requested next appt. be pushed out to 4/27.       PLAN     Recommended plan for temporary change(s) affecting INR     Dosing Instructions: Continue your current warfarin dose with next INR in 4 weeks       Summary  As of 3/29/2022    Full warfarin instructions:  7.5 mg every Mon; 5 mg all other days   Next INR check:  4/27/2022             Telephone call with  Nicolette who agrees to plan and repeated back plan correctly    Lab visit scheduled    Education provided: Please call back if any changes to your diet, medications or how you've been taking warfarin    Plan made per [facility] anticoagulation protocol    Ciro Bajwa, RN  Anticoagulation Clinic  3/29/2022    _______________________________________________________________________     Anticoagulation Episode Summary     Current INR goal:  1.5-2.5   TTR:  80.6 % (1 y)   Target end date:  Indefinite   Send INR reminders to:  [facility]    Indications    Atrial fibrillation (H) [I48.91] [I48.91]  Long-term (current) use of anticoagulants [Z79.01] [Z79.01]  Deep vein thrombosis (DVT) (H) [I82.409]  Atrial fibrillation  unspecified type (H) [I48.91]           Comments:  Hx of Falls/Bleed         Anticoagulation Care Providers     Provider Role Specialty Phone number    Frida Desai MD Referring Cardiovascular Disease  [phone]

## 2022-03-29 NOTE — TELEPHONE ENCOUNTER
The pt is scheduled on 4/8 at the [facility] with [facility].    The pt was on the phone during scheduling of the above appointments and agreed to the dates times and locations of the appointments.

## 2022-04-08 ENCOUNTER — OFFICE VISIT (OUTPATIENT)
Dept: CARDIOLOGY | Facility: CLINIC | Age: 87
End: 2022-04-08
Attending: INTERNAL MEDICINE
Payer: COMMERCIAL

## 2022-04-08 VITALS — DIASTOLIC BLOOD PRESSURE: 65 MMHG | SYSTOLIC BLOOD PRESSURE: 120 MMHG | OXYGEN SATURATION: 100 % | HEART RATE: 60 BPM

## 2022-04-08 DIAGNOSIS — I10 ESSENTIAL HYPERTENSION: ICD-10-CM

## 2022-04-08 DIAGNOSIS — I48.20 CHRONIC ATRIAL FIBRILLATION (H): ICD-10-CM

## 2022-04-08 DIAGNOSIS — I87.2 CHRONIC VENOUS INSUFFICIENCY: Primary | ICD-10-CM

## 2022-04-08 DIAGNOSIS — I82.401 ACUTE DEEP VEIN THROMBOSIS (DVT) OF RIGHT LOWER EXTREMITY, UNSPECIFIED VEIN (H): ICD-10-CM

## 2022-04-08 DIAGNOSIS — E78.5 HYPERLIPIDEMIA LDL GOAL <70: ICD-10-CM

## 2022-04-08 PROCEDURE — 99214 OFFICE O/P EST MOD 30 MIN: CPT | Performed by: INTERNAL MEDICINE

## 2022-04-08 PROCEDURE — G0463 HOSPITAL OUTPT CLINIC VISIT: HCPCS

## 2022-04-08 ASSESSMENT — PAIN SCALES - GENERAL: PAINLEVEL: NO PAIN (0)

## 2022-04-08 NOTE — PATIENT INSTRUCTIONS
Thank you so much for choosing us for your care. It was a pleasure to see you at the vascular clinic today.     Follow-up recommendations: Return to clinic in 1 year. Please let us know if any issues arise in the meantime.    Additional testing/imaging ordered today: None      Our scheduling team will get in touch with you to set up any follow-up testing/imaging and/or appointments. Please be aware that any testing/imaging recommended today will need to completed prior to your next visit with the provider. If testing/imaging is not completed prior to your next visit, your visit may be rescheduled.        If you have any questions, please call Mili Montero RN at [phone] or contact our clinic at [phone]. We also encourage the use of xoompark to communicate with your HealthCare Provider.      If you have an urgent need after business hours (8:00 am to 4:30 pm) please call [phone], option 4, and ask for the vascular attending on call. For non-urgent after hours needs, please call the vascular nurse at [phone] and leave a detailed voicemail. For scheduling needs, please call our clinic directly at [phone].    Patient Education     Preventing Falls at Home  A person can fall for many reasons. Older adults may fall because reaction time slows down as we age. Your muscles and joints may get stiff, weak, or less flexible because of illness, medicines, or a physical condition.   Other health problems that make falls more likely include:     Arthritis    Dizziness or lightheadedness when you stand up (orthostatic hypotension)    History of a stroke    Dizziness    Anemia    Certain medicines taken for mental illness or to control blood pressure.    Problems with balance or gait    Bladder or urinary problems    History of falling    Changes in vision (vision impairment)    Changes in thinking skills and memory (cognitive impairment)  Injuries from a fall can include serious injuries such as  broken bones, dislocated joints, internal bleeding and cuts. Injuries like these can limit your independence.   Prevention tips  To help prevent falls and fall-related injuries, follow the tips below.    Floors  To make floors safer:     Put nonskid pads under area rugs.    Remove small rugs.    Replace worn floor coverings.    Tack carpets firmly to each step on carpeted stairs. Put nonskid strips on the edges of uncarpeted stairs.    Keep floors and stairs free of clutter and cords.    Arrange furniture so there are clear pathways.    Clean up any spills right away.  Bathrooms    To make bathrooms safer:     Install grab bars in the tub or shower.    Apply nonskid strips or put a nonskid rubber mat in the tub or shower.    Sit on a bath chair to bathe.    Use bathmats with nonskid backing.  Lighting  To improve visibility in your home:      Keep a flashlight in each room. Or put a lamp next to the bed within easy reach.    Put nightlights in the bedrooms, hallways, kitchen, and bathrooms.    Make sure all stairways have good lighting.    Take your time when going up and down stairs.    Put handrails on both sides of stairs and in walkways for more support. To prevent injury to your wrist or arm, don t use handrails to pull yourself up.    Install grab bars to pull yourself up.    Move or rearrange items that you use often. This will make them easier to find or reach.    Look at your home to find any safety hazards. Especially look at doorways, walkways, and the driveway. Remove or repair any safety problems that you find.  Other changes to make    Look around to find any safety hazards. Look closely at doorways, walkways, and the driveway. Remove or repair any safety problems that you find.    Wear shoes that fit well.    Take your time when going up and down stairs.    Put handrails on both sides of stairs and in walkways for more support. To prevent injury to your wrist or arm, don t use handrails to pull  yourself up.    Install grab bars wherever needed to pull yourself up.    Arrange items that you use often. This will make them easier to find or reach.    Greg last reviewed this educational content on 3/1/2020    8247-4699 [employer]. All rights reserved. This information is not intended as a substitute for professional medical care. Always follow your healthcare professional's instructions.

## 2022-04-08 NOTE — NURSING NOTE
Chief Complaint   Patient presents with     Follow Up     annual follow up      Vitals were taken and medications were reconciled.   Joni Calixto, EMT  12:41 PM

## 2022-04-08 NOTE — PROGRESS NOTES
Noe Florence is a 86 year old male who is presenting at the current time to discuss his diagnosi(es) of        Chronic venous insufficiency  Acute deep vein thrombosis (DVT) of right lower extremity, unspecified vein (H)  Chronic atrial fibrillation (H)  Essential hypertension  Hyperlipidemia LDL goal <70  .    HPI: Noe Florence is a 86 year old male with a past medical history significant for DVT in 4/2014 and then 7/2019, CAD s/p CABG x2, DESx2, polymorphic VT s/p ICD placement (5/2012, gen change 12/2018), and AF/AFL s/p CTI (6/2014) and right atrial appendage atrial tachycardia ablation (9/2014), HTN, HLD, and CKD3.      Cardiac risk factors include (+) hypertension, (+) hypercholesterolemia , (-) diabetes, (+) family Hx CAD, (-) tobacco, (-) cerebrovascular disease, (+) PAD.      He underwent colorectal surgery for colon cancer in February 2019.  In March 2019, patient fell and suffered a left hip fracture and underwent surgery.  In May 2019 patient fell again and suffered a distal right humerus fracture. With this fall history, his anticoagulation was stopped as Mr. Florence and his wife considered left atrial appendage occlusive device. He has a history of left femoral vein DVT several years ago but 3 weeks ago developed left LE edema again. He was evaluated in the ED on 7/26/19 and diagnosed with an occlusive popliteal vein DVT on ultrasound. He was started on lovenox and restarted on warfarin. Goal INR is between 1.5-2.5.     Patient is a long standing , who played vigorously up until 80 years old from a young age. Only when he stopped playing tennis did he discover progressive swelling. He was prescribed compression stockings (20-30 mmHg) however these were too large for him and would not stay up appropriately. His swelling was off and on for 2 years. Has an open wound for which gets wound care and dressings. 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 He is chronically on warfarin and reports no bleeding/bruising.      As of today, patient denies any pain or new wounds in his lower extremities. No significant edema. Denies dyspnea with exertion. Denies chest pain. Still using his walker, but hopeful to transition to just a cane for now. Able to ambulate 178 steps, up to 20 minutes, without needing to stop to rest.        Review Of Systems  Skin: negative  Eyes: negative  Ears/Nose/Throat: negative  Respiratory: No shortness of breath, dyspnea on exertion, cough, or hemoptysis  Cardiovascular: negative  Gastrointestinal: negative  Genitourinary: negative  Musculoskeletal: negative  Neurologic: negative  Psychiatric: negative  Hematologic/Lymphatic/Immunologic: negative  Endocrine: negative    PAST MEDICAL HISTORY:                  Past Medical History:   Diagnosis Date     Advanced open-angle glaucoma      Atrial fibrillation (H)      CKD (chronic kidney disease), stage III (H) 2005     Colon cancer (H)     Stage II-B colon cancer     Coronary artery disease     s/p CABG x 2, JEREMY x 2     Diverticulitis      Hyperlipidemia      Hypertension      Ischemic cardiomyopathy      MGUS (monoclonal gammopathy of unknown significance)      Nonsenile cataract     BE     Osteoporosis     left hip fracture     Polymorphic ventricular tachycardia (H)      Polymyalgia rheumatica (H)      PVD (posterior vitreous detachment), both eyes 2005     S/P ablation of atrial flutter 6/20/14    CTI     Stented coronary artery      SVT (supraventricular tachycardia) (H)     PPM/AICD for NSVT     Upper leg DVT (deep venous thromboembolism), chronic (H)     Left     Weight loss, unintentional 2017    15 lb in 4 months       PAST SURGICAL HISTORY:                  Past Surgical History:   Procedure Laterality Date     CATARACT IOL, RT/LT Bilateral      COLONOSCOPY  3/13/2014    Procedure: COMBINED COLONOSCOPY, SINGLE BIOPSY/POLYPECTOMY BY BIOPSY;;  Surgeon: Buzz  Mary Potter MD;  Location:  GI     COLONOSCOPY N/A 6/22/2015    Procedure: COLONOSCOPY;  Surgeon: Marilin Newman MD;  Location:  GI     COLONOSCOPY N/A 11/7/2018    Procedure: COMBINED COLONOSCOPY, SINGLE OR MULTIPLE BIOPSY/POLYPECTOMY BY BIOPSY;  Surgeon: Roberto Esteban MD;  Location:  GI     EP ICD GENERATOR REPLACEMENT DUAL N/A 12/11/2018    Procedure: EP ICD Generator Change Dual;  Surgeon: Deedee Baird MD;  Location:  HEART CARDIAC CATH LAB     H ABLATION ATRIAL FLUTTER       HEART CATH DRUG ELUTING STENT PLACEMENT  4/19/2012    JEREMY x 2 to LCx     IMPLANT IMPLANTABLE CARDIOVERTER DEFIBRILLATOR  5-    ppm/aicd     KNEE SURGERY      right and left knee surgeries     LAPAROSCOPIC ASSISTED COLECTOMY Right 2/1/2019    Procedure: Laparoscopic Converted to Open Transverse Colectomy with Lysis of Adhesions;  Surgeon: Cahnelle Guzmán MD;  Location:  OR     LAPAROSCOPIC ASSISTED COLECTOMY LEFT (DESCENDING)  4/8/2014    Procedure: LAPAROSCOPIC ASSISTED COLECTOMY LEFT (DESCENDING);  Hand assisted Laparoscopic Sigmoid Colectomy , Laparoscopic mobilization of spleenic flexure *Latex Allergy*Anesthesia General with Block;  Surgeon: Chanelle Guzmán MD;  Location: [facility] OR     OPEN REDUCTION INTERNAL FIXATION RODDING INTRAMEDULLAR FEMUR FRACTURE TABLE Left 3/14/2019    Procedure: Open Reduction Internal Fixation Left Femur Intramedullar Nailing;  Surgeon: Srikanth Avalos MD;  Location:  OR     REPAIR VALVE MITRAL  2006     30-mm Medtronic Julian ring      SELECTIVE LASER TRABECULOPLASTY (SLT) OD (RIGHT EYE)  4/10, 1/12,+1/9/13    RE     SELECTIVE LASER TRABECULOPLASTY (SLT) OS (LEFT EYE)  5/2012     SHOULDER SURGERY      right rotator cuff     ZZC CABG, ARTERY-VEIN, FOUR  2006    LIMA-LAD, SVG-Rt PDA, SVG-OM2, SVG-Diag 1     ZZC CABG, VEIN, SINGLE  1994    1-vessel CABG, SVG->PDRCA        CURRENT MEDICATIONS:                  Current Outpatient Medications    Medication Sig Dispense Refill     alendronate (FOSAMAX) 70 MG tablet Take 1 tablet (70 mg) by mouth every 7 days Take with a full glass of water and do not eat or lay down for 30 minutes 12 tablet 3     ARIPiprazole (ABILIFY) 2 MG tablet Take 2 mg by mouth daily       atorvastatin (LIPITOR) 20 MG tablet Take 1 tablet (20 mg) by mouth daily 100 tablet 3     calcium carbonate 500 mg, elemental, (OSCAL;OYSTER SHELL CALCIUM) 500 MG tablet Take 1 tablet (500 mg) by mouth 2 times daily 180 tablet 3     cholecalciferol 1000 units TABS Take 1,000 Units by mouth daily        COMPRESSION STOCKINGS 1 each daily 1 each 4     Cyanocobalamin (VITAMIN B-12 CR PO)        ferrous sulfate (FE TABS) 325 (65 Fe) MG EC tablet Take 325 mg by mouth daily       ketoconazole (NIZORAL) 2 % external cream Apply to the feet 2 times a day until rash clears then 3-4 times a week for maintenance 60 g 11     metoprolol tartrate (LOPRESSOR) 25 MG tablet Take 1 tablet (25 mg) by mouth 2 times daily 180 tablet 3     mirtazapine (REMERON) 15 MG tablet Take 15 mg by mouth At Bedtime.       Multiple Vitamins-Minerals (MULTIVITAMIN ADULT PO)        sertraline (ZOLOFT) 100 MG tablet Take 150 mg by mouth every evening        tamsulosin (FLOMAX) 0.4 MG capsule Take 2 capsules (0.8 mg) by mouth daily 180 capsule 3     tiZANidine (ZANAFLEX) 2 MG tablet Take 1-2 tablets (2-4 mg) by mouth 3 times daily as needed for muscle spasms 30 tablet 0     warfarin ANTICOAGULANT (COUMADIN) 5 MG tablet Take 1 to 1.5 tablets daily or as directed by the Coumadin clinic 90 tablet 4       ALLERGIES:                  Allergies   Allergen Reactions     Latex Rash     Rash       SOCIAL HISTORY:                  Social History     Socioeconomic History     Marital status:      Spouse name: Not on file     Number of children: Not on file     Years of education: Not on file     Highest education level: Not on file   Occupational History     Not on file   Tobacco Use      Smoking status: Former Smoker     Types: Cigarettes, Cigars     Quit date: 1968     Years since quittin.3     Smokeless tobacco: Never Used   Substance and Sexual Activity     Alcohol use: Yes     Alcohol/week: 0.0 standard drinks     Comment: 2-3 drinks twice weekly     Drug use: No     Sexual activity: Not on file   Other Topics Concern     Parent/sibling w/ CABG, MI or angioplasty before 65F 55M? Not Asked   Social History Narrative     Not on file     Social Determinants of Health     Financial Resource Strain: Not on file   Food Insecurity: Not on file   Transportation Needs: Not on file   Physical Activity: Not on file   Stress: Not on file   Social Connections: Not on file   Intimate Partner Violence: Not on file   Housing Stability: Not on file       FAMILY HISTORY:                   Family History   Problem Relation Age of Onset     C.A.D. Father      Anesthesia Reaction No family hx of      Crohn's Disease No family hx of      Ulcerative Colitis No family hx of      Cancer - colorectal No family hx of      Macular Degeneration No family hx of      Cancer No family hx of         No known family hx of skin cancer     Melanoma No family hx of      Skin Cancer No family hx of          Physical exam Reveals:    O/P: WNL  HEENT: WNL  NECK: No JVD, thyromegaly, or lymphadenopathy  HEART: RRR, no murmurs, gallops, or rubs  LUNGS: CTA bilaterally without rales, wheezes, or rhonchi  GI: NABS, nondistended, nontender, soft  EXT:without cyanosis, clubbing; 2 plus BLE edema w/ CEAP C4 varicosities (lipodermatosclerosis)   NEURO: nonfocal  : no flank tenderness      A/P:      VENOUS US, BLE:  (2017)  1. Right leg: Incompetence of right common femoral vein, residual right great saphenous vein in the proximal and mid thigh and incompetent right saphenofemoral junction. The great saphenous vein dimensions in this location may be from 2 to 6 mm. Incompetent  vein measuring 4 mm in the leg four  centimeter from the medial malleolus, which connects to a competent tributary of the great saphenous vein. No varicose veins.     2. Left leg: Incompetence of the common femoral vein, femoral vein in the distal thigh and popliteal vein. Incompetent saphenofemoral junction, greater saphenous vein in the proximal calf. The diameter of the greater saphenous vein about the SFJ varies from 4.9 to 7.2 mm and 3 to 4.4 mm in the region of the knee/proximal calf. 2 incompetent perforators in the lower leg, measuring up to 2.7 and 2.3 cm. No varicose veins.       ABIs (Dec 2019):   Right:   DELFINO: 1.35   TBI: 1.03  Left:  DELFINO: Noncompressible   TBI: 1.17     ARTERIAL US, BLE  (Dec 2019):  Impression:   1. Right leg: Widely patent arteries with no evidence of occlusion or hemodynamically significant stenosis by velocity or waveform criteria.  2. Left leg: .Elevated velocity in the proximal SFA of 153 cm/s with velocity ratio of slightly greater than 2, suggestive of 20-49% stenosis.  Monophasic waveform in the distal PTA, without evidence of proximal occlusion or stenosis in this vessel, is of uncertain significance but can be seen when there is inflammation in the foot.     Assessment and Plan:   85 year old male h/o DVT 4/2014 and 7/2019, PAD, CAD s/p CABG x2, JEREMY x2, VT s/p ICD, AF/AFL s/p ablation and NICKI closure, HTN, HLD and CKD3 who presents for follow up.      1. Chronic venous insufficiency - no major interventions today, re-prescribed compression stockings, 30-40mmHg for both legs.  2. 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 CAD s/p CABG and PCI - On Atorvastatin 20 mg daily. Was taken off of Aspirin due to recurrent falls, and the fact that he is Warfarin for prior DVTs.  4. PAD (R. DELFINO: 1.35; TBI: 1.03; L DELFINO: Non compressible; TBI: 1.17). -> Continue Atorvastatin 20 mg daily. Was taken off of Aspirin due to recurrent falls, and the fact that he is Warfarin for  prior DVTs.  5. Hypertension - Controlled, off medications for now  6. Hyperlipidemia - Controlled with Atorvastatin 20, LDL was 52 on 5/2021        RTC one year. 30 minutes total medical care On today's date. Time counter in EPIC < 30 minutes as I was not logged into his chart while providing all care to the patient.

## 2022-04-08 NOTE — LETTER
4/8/2022      RE: Noe Florence  [address] [facility] [address]       Dear Colleague,    Thank you for the opportunity to participate in the care of your patient, Noe Florence, at the [facility] [place] [facility]. Please see a copy of my visit note below.    Noe Florence is a 86 year old male who is presenting at the current time to discuss his diagnosi(es) of        Chronic venous insufficiency  Acute deep vein thrombosis (DVT) of right lower extremity, unspecified vein (H)  Chronic atrial fibrillation (H)  Essential hypertension  Hyperlipidemia LDL goal <70  .    HPI: Noe Florence is a 86 year old male with a past medical history significant for DVT in 4/2014 and then 7/2019, CAD s/p CABG x2, DESx2, polymorphic VT s/p ICD placement (5/2012, gen change 12/2018), and AF/AFL s/p CTI (6/2014) and right atrial appendage atrial tachycardia ablation (9/2014), HTN, HLD, and CKD3.      Cardiac risk factors include (+) hypertension, (+) hypercholesterolemia , (-) diabetes, (+) family Hx CAD, (-) tobacco, (-) cerebrovascular disease, (+) PAD.      He underwent colorectal surgery for colon cancer in February 2019.  In March 2019, patient fell and suffered a left hip fracture and underwent surgery.  In May 2019 patient fell again and suffered a distal right humerus fracture. With this fall history, his anticoagulation was stopped as Mr. Florence and his wife considered left atrial appendage occlusive device. He has a history of left femoral vein DVT several years ago but 3 weeks ago developed left LE edema again. He was evaluated in the ED on 7/26/19 and diagnosed with an occlusive popliteal vein DVT on ultrasound. He was started on lovenox and restarted on warfarin. Goal INR is between 1.5-2.5.     Patient is a long standing , who played vigorously up until 80 years old from a young age. Only when he stopped playing tennis did  he discover progressive swelling. He was prescribed compression stockings (20-30 mmHg) however these were too large for him and would not stay up appropriately. His swelling was off and on for 2 years. Has an open wound for which gets wound care and dressings. 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 He is chronically on warfarin and reports no bleeding/bruising.      As of today, patient denies any pain or new wounds in his lower extremities. No significant edema. Denies dyspnea with exertion. Denies chest pain. Still using his walker, but hopeful to transition to just a cane for now. Able to ambulate 178 steps, up to 20 minutes, without needing to stop to rest.        Review Of Systems  Skin: negative  Eyes: negative  Ears/Nose/Throat: negative  Respiratory: No shortness of breath, dyspnea on exertion, cough, or hemoptysis  Cardiovascular: negative  Gastrointestinal: negative  Genitourinary: negative  Musculoskeletal: negative  Neurologic: negative  Psychiatric: negative  Hematologic/Lymphatic/Immunologic: negative  Endocrine: negative    PAST MEDICAL HISTORY:                  Past Medical History:   Diagnosis Date     Advanced open-angle glaucoma      Atrial fibrillation (H)      CKD (chronic kidney disease), stage III (H) 2005     Colon cancer (H)     Stage II-B colon cancer     Coronary artery disease     s/p CABG x 2, JEREMY x 2     Diverticulitis      Hyperlipidemia      Hypertension      Ischemic cardiomyopathy      MGUS (monoclonal gammopathy of unknown significance)      Nonsenile cataract     BE     Osteoporosis     left hip fracture     Polymorphic ventricular tachycardia (H)      Polymyalgia rheumatica (H)      PVD (posterior vitreous detachment), both eyes 2005     S/P ablation of atrial flutter 6/20/14    CTI     Stented coronary artery      SVT (supraventricular tachycardia) (H)     PPM/AICD for NSVT     Upper leg DVT (deep venous  thromboembolism), chronic (H)     Left     Weight loss, unintentional 2017    15 lb in 4 months       PAST SURGICAL HISTORY:                  Past Surgical History:   Procedure Laterality Date     CATARACT IOL, RT/LT Bilateral      COLONOSCOPY  3/13/2014    Procedure: COMBINED COLONOSCOPY, SINGLE BIOPSY/POLYPECTOMY BY BIOPSY;;  Surgeon: Mray Gerber MD;  Location:  GI     COLONOSCOPY N/A 6/22/2015    Procedure: COLONOSCOPY;  Surgeon: Marilin Newman MD;  Location:  GI     COLONOSCOPY N/A 11/7/2018    Procedure: COMBINED COLONOSCOPY, SINGLE OR MULTIPLE BIOPSY/POLYPECTOMY BY BIOPSY;  Surgeon: Roberto Esteban MD;  Location:  GI     EP ICD GENERATOR REPLACEMENT DUAL N/A 12/11/2018    Procedure: EP ICD Generator Change Dual;  Surgeon: Deedee Baird MD;  Location:  HEART CARDIAC CATH LAB     H ABLATION ATRIAL FLUTTER       HEART CATH DRUG ELUTING STENT PLACEMENT  4/19/2012    JEREMY x 2 to LCx     IMPLANT IMPLANTABLE CARDIOVERTER DEFIBRILLATOR  5-    ppm/aicd     KNEE SURGERY      right and left knee surgeries     LAPAROSCOPIC ASSISTED COLECTOMY Right 2/1/2019    Procedure: Laparoscopic Converted to Open Transverse Colectomy with Lysis of Adhesions;  Surgeon: Chanelle Guzmán MD;  Location:  OR     LAPAROSCOPIC ASSISTED COLECTOMY LEFT (DESCENDING)  4/8/2014    Procedure: LAPAROSCOPIC ASSISTED COLECTOMY LEFT (DESCENDING);  Hand assisted Laparoscopic Sigmoid Colectomy , Laparoscopic mobilization of spleenic flexure *Latex Allergy*Anesthesia General with Block;  Surgeon: Chanelle Guzmán MD;  Location: [facility] OR     OPEN REDUCTION INTERNAL FIXATION RODDING INTRAMEDULLAR FEMUR FRACTURE TABLE Left 3/14/2019    Procedure: Open Reduction Internal Fixation Left Femur Intramedullar Nailing;  Surgeon: Srikanth Avalos MD;  [facility]     REPAIR VALVE MITRAL  2006     30-mm Medtronic Julian ring      SELECTIVE LASER TRABECULOPLASTY (SLT) OD (RIGHT EYE)  4/10,  1/12,+1/9/13    RE     SELECTIVE LASER TRABECULOPLASTY (SLT) OS (LEFT EYE)  5/2012     SHOULDER SURGERY      right rotator cuff     [facility] CABG, ARTERY-VEIN, FOUR  2006    LIMA-LAD, SVG-Rt PDA, SVG-OM2, SVG-Diag 1     [facility] CABG, VEIN, SINGLE  1994    1-vessel CABG, SVG->PDRCA        CURRENT MEDICATIONS:                  Current Outpatient Medications   Medication Sig Dispense Refill     alendronate (FOSAMAX) 70 MG tablet Take 1 tablet (70 mg) by mouth every 7 days Take with a full glass of water and do not eat or lay down for 30 minutes 12 tablet 3     ARIPiprazole (ABILIFY) 2 MG tablet Take 2 mg by mouth daily       atorvastatin (LIPITOR) 20 MG tablet Take 1 tablet (20 mg) by mouth daily 100 tablet 3     calcium carbonate 500 mg, elemental, (OSCAL;OYSTER SHELL CALCIUM) 500 MG tablet Take 1 tablet (500 mg) by mouth 2 times daily 180 tablet 3     cholecalciferol 1000 units TABS Take 1,000 Units by mouth daily        COMPRESSION STOCKINGS 1 each daily 1 each 4     Cyanocobalamin (VITAMIN B-12 CR PO)        ferrous sulfate (FE TABS) 325 (65 Fe) MG EC tablet Take 325 mg by mouth daily       ketoconazole (NIZORAL) 2 % external cream Apply to the feet 2 times a day until rash clears then 3-4 times a week for maintenance 60 g 11     metoprolol tartrate (LOPRESSOR) 25 MG tablet Take 1 tablet (25 mg) by mouth 2 times daily 180 tablet 3     mirtazapine (REMERON) 15 MG tablet Take 15 mg by mouth At Bedtime.       Multiple Vitamins-Minerals (MULTIVITAMIN ADULT PO)        sertraline (ZOLOFT) 100 MG tablet Take 150 mg by mouth every evening        tamsulosin (FLOMAX) 0.4 MG capsule Take 2 capsules (0.8 mg) by mouth daily 180 capsule 3     tiZANidine (ZANAFLEX) 2 MG tablet Take 1-2 tablets (2-4 mg) by mouth 3 times daily as needed for muscle spasms 30 tablet 0     warfarin ANTICOAGULANT (COUMADIN) 5 MG tablet Take 1 to 1.5 tablets daily or as directed by the Coumadin clinic 90 tablet 4       ALLERGIES:                  Allergies    Allergen Reactions     Latex Rash     Rash       SOCIAL HISTORY:                  Social History     Socioeconomic History     Marital status:      Spouse name: Not on file     Number of children: Not on file     Years of education: Not on file     Highest education level: Not on file   Occupational History     Not on file   Tobacco Use     Smoking status: Former Smoker     Types: Cigarettes, Cigars     Quit date: 1968     Years since quittin.3     Smokeless tobacco: Never Used   Substance and Sexual Activity     Alcohol use: Yes     Alcohol/week: 0.0 standard drinks     Comment: 2-3 drinks twice weekly     Drug use: No     Sexual activity: Not on file   Other Topics Concern     Parent/sibling w/ CABG, MI or angioplasty before 65F 55M? Not Asked   Social History Narrative     Not on file     Social Determinants of Health     Financial Resource Strain: Not on file   Food Insecurity: Not on file   Transportation Needs: Not on file   Physical Activity: Not on file   Stress: Not on file   Social Connections: Not on file   Intimate Partner Violence: Not on file   Housing Stability: Not on file       FAMILY HISTORY:                   Family History   Problem Relation Age of Onset     C.A.D. Father      Anesthesia Reaction No family hx of      Crohn's Disease No family hx of      Ulcerative Colitis No family hx of      Cancer - colorectal No family hx of      Macular Degeneration No family hx of      Cancer No family hx of         No known family hx of skin cancer     Melanoma No family hx of      Skin Cancer No family hx of          Physical exam Reveals:    O/P: WNL  HEENT: WNL  NECK: No JVD, thyromegaly, or lymphadenopathy  HEART: RRR, no murmurs, gallops, or rubs  LUNGS: CTA bilaterally without rales, wheezes, or rhonchi  GI: NABS, nondistended, nontender, soft  EXT:without cyanosis, clubbing; 2 plus BLE edema w/ CEAP C4 varicosities (lipodermatosclerosis)   NEURO: nonfocal  : no flank  tenderness      A/P:      VENOUS US, BLE:  (Nov 2017)  1. Right leg: Incompetence of right common femoral vein, residual right great saphenous vein in the proximal and mid thigh and incompetent right saphenofemoral junction. The great saphenous vein dimensions in this location may be from 2 to 6 mm. Incompetent  vein measuring 4 mm in the leg four centimeter from the medial malleolus, which connects to a competent tributary of the great saphenous vein. No varicose veins.     2. Left leg: Incompetence of the common femoral vein, femoral vein in the distal thigh and popliteal vein. Incompetent saphenofemoral junction, greater saphenous vein in the proximal calf. The diameter of the greater saphenous vein about the SFJ varies from 4.9 to 7.2 mm and 3 to 4.4 mm in the region of the knee/proximal calf. 2 incompetent perforators in the lower leg, measuring up to 2.7 and 2.3 cm. No varicose veins.       ABIs (Dec 2019):   Right:   DELFINO: 1.35   TBI: 1.03  Left:  DELFINO: Noncompressible   TBI: 1.17     ARTERIAL US, BLE  (Dec 2019):  Impression:   1. Right leg: Widely patent arteries with no evidence of occlusion or hemodynamically significant stenosis by velocity or waveform criteria.  2. Left leg: .Elevated velocity in the proximal SFA of 153 cm/s with velocity ratio of slightly greater than 2, suggestive of 20-49% stenosis.  Monophasic waveform in the distal PTA, without evidence of proximal occlusion or stenosis in this vessel, is of uncertain significance but can be seen when there is inflammation in the foot.     Assessment and Plan:   85 year old male h/o DVT 4/2014 and 7/2019, PAD, CAD s/p CABG x2, JEREMY x2, VT s/p ICD, AF/AFL s/p ablation and NICKI closure, HTN, HLD and CKD3 who presents for follow up.      1. Chronic venous insufficiency - no major interventions today, re-prescribed compression stockings, 30-40mmHg for both legs.  2. 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 CAD s/p CABG and PCI - On Atorvastatin 20 mg daily. Was taken off of Aspirin due to recurrent falls, and the fact that he is Warfarin for prior DVTs.  4. PAD (R. DELFINO: 1.35; TBI: 1.03; L DELFINO: Non compressible; TBI: 1.17). -> Continue Atorvastatin 20 mg daily. Was taken off of Aspirin due to recurrent falls, and the fact that he is Warfarin for prior DVTs.  5. Hypertension - Controlled, off medications for now  6. Hyperlipidemia - Controlled with Atorvastatin 20, LDL was 52 on 5/2021        RTC one year. 30 minutes total medical care On today's date. Time counter in EPIC < 30 minutes as I was not logged into his chart while providing all care to the patient.       Please do not hesitate to contact me if you have any questions/concerns.     Sincerely,     See Dominguez MD

## 2022-04-15 ENCOUNTER — OFFICE VISIT (OUTPATIENT)
Dept: INTERNAL MEDICINE | Facility: CLINIC | Age: 87
End: 2022-04-15
Payer: COMMERCIAL

## 2022-04-15 VITALS
DIASTOLIC BLOOD PRESSURE: 66 MMHG | HEIGHT: 67 IN | HEART RATE: 68 BPM | OXYGEN SATURATION: 95 % | BODY MASS INDEX: 24.35 KG/M2 | SYSTOLIC BLOOD PRESSURE: 122 MMHG

## 2022-04-15 DIAGNOSIS — N18.31 STAGE 3A CHRONIC KIDNEY DISEASE (H): ICD-10-CM

## 2022-04-15 DIAGNOSIS — D47.2 MONOCLONAL PARAPROTEINEMIA: ICD-10-CM

## 2022-04-15 DIAGNOSIS — R29.6 FALLS FREQUENTLY: Primary | ICD-10-CM

## 2022-04-15 DIAGNOSIS — M21.379 FOOT-DROP, UNSPECIFIED LATERALITY: ICD-10-CM

## 2022-04-15 DIAGNOSIS — M62.81 MUSCLE WEAKNESS (GENERALIZED): ICD-10-CM

## 2022-04-15 DIAGNOSIS — R53.81 PHYSICAL DECONDITIONING: ICD-10-CM

## 2022-04-15 DIAGNOSIS — M81.0 AGE RELATED OSTEOPOROSIS, UNSPECIFIED PATHOLOGICAL FRACTURE PRESENCE: ICD-10-CM

## 2022-04-15 PROCEDURE — 99214 OFFICE O/P EST MOD 30 MIN: CPT | Performed by: INTERNAL MEDICINE

## 2022-04-15 RX ORDER — ALENDRONATE SODIUM 70 MG/1
70 TABLET ORAL
Qty: 12 TABLET | Refills: 3 | Status: SHIPPED | OUTPATIENT
Start: 2022-04-15 | End: 2023-01-01

## 2022-04-15 NOTE — PROGRESS NOTES
HPI  86-year-old returns today with his wife for continued progressive weakness and falls.  He is having more difficulty in their home.  There is 2 steps to get out of the home and is unable to navigate these.  She is unable to assist him and he requires additional assistance.  He also due to weakness is having difficulty transferring and poor mobility.  With the falls he has not been injured.  Because of some dental work in August he has not been taking the alendronate.  We are concerned with his osteoporosis and falls and he is at high risk for fracture.  He is following with cardiology and with oncology as well.  No new or different medications.  Past Medical History:   Diagnosis Date     Advanced open-angle glaucoma      Atrial fibrillation (H)      CKD (chronic kidney disease), stage III (H) 2005     Colon cancer (H)     Stage II-B colon cancer     Coronary artery disease     s/p CABG x 2, JEREMY x 2     Diverticulitis      Hyperlipidemia      Hypertension      Ischemic cardiomyopathy      MGUS (monoclonal gammopathy of unknown significance)      Nonsenile cataract     BE     Osteoporosis     left hip fracture     Polymorphic ventricular tachycardia (H)      Polymyalgia rheumatica (H)      PVD (posterior vitreous detachment), both eyes 2005     S/P ablation of atrial flutter 6/20/14    CTI     Stented coronary artery      SVT (supraventricular tachycardia) (H)     PPM/AICD for NSVT     Upper leg DVT (deep venous thromboembolism), chronic (H)     Left     Weight loss, unintentional 2017    15 lb in 4 months     Past Surgical History:   Procedure Laterality Date     CATARACT IOL, RT/LT Bilateral      COLONOSCOPY  3/13/2014    Procedure: COMBINED COLONOSCOPY, SINGLE BIOPSY/POLYPECTOMY BY BIOPSY;;  Surgeon: Mary Gerber MD;  Location:  GI     COLONOSCOPY N/A 6/22/2015    Procedure: COLONOSCOPY;  Surgeon: Marilin Newman MD;  Location:  GI     COLONOSCOPY N/A 11/7/2018    Procedure: COMBINED  COLONOSCOPY, SINGLE OR MULTIPLE BIOPSY/POLYPECTOMY BY BIOPSY;  Surgeon: Roberto Esteban MD;  Location:  GI     EP ICD GENERATOR REPLACEMENT DUAL N/A 12/11/2018    Procedure: EP ICD Generator Change Dual;  Surgeon: Deedee Baird MD;  Location:  HEART CARDIAC CATH LAB     H ABLATION ATRIAL FLUTTER       HEART CATH DRUG ELUTING STENT PLACEMENT  4/19/2012    JEREMY x 2 to LCx     IMPLANT IMPLANTABLE CARDIOVERTER DEFIBRILLATOR  5-    ppm/aicd     KNEE SURGERY      right and left knee surgeries     LAPAROSCOPIC ASSISTED COLECTOMY Right 2/1/2019    Procedure: Laparoscopic Converted to Open Transverse Colectomy with Lysis of Adhesions;  Surgeon: Chanelle Guzmán MD;  Location: [facility] OR     LAPAROSCOPIC ASSISTED COLECTOMY LEFT (DESCENDING)  4/8/2014    Procedure: LAPAROSCOPIC ASSISTED COLECTOMY LEFT (DESCENDING);  Hand assisted Laparoscopic Sigmoid Colectomy , Laparoscopic mobilization of spleenic flexure *Latex Allergy*Anesthesia General with Block;  Surgeon: Chanelle Guzmán MD;  Location: [facility] OR     OPEN REDUCTION INTERNAL FIXATION RODDING INTRAMEDULLAR FEMUR FRACTURE TABLE Left 3/14/2019    Procedure: Open Reduction Internal Fixation Left Femur Intramedullar Nailing;  Surgeon: Srikanth Avalos MD;  [facility]     REPAIR VALVE MITRAL  2006     30-mm Medtronic Julian ring      SELECTIVE LASER TRABECULOPLASTY (SLT) OD (RIGHT EYE)  4/10, 1/12,+1/9/13    RE     SELECTIVE LASER TRABECULOPLASTY (SLT) OS (LEFT EYE)  5/2012     SHOULDER SURGERY      right rotator cuff     [facility] CABG, ARTERY-VEIN, FOUR  2006    LIMA-LAD, SVG-Rt PDA, SVG-OM2, SVG-Diag 1     [facility] CABG, VEIN, SINGLE  1994    1-vessel CABG, SVG->PDRCA      Family History   Problem Relation Age of Onset     C.A.D. Father      Anesthesia Reaction No family hx of      Crohn's Disease No family hx of      Ulcerative Colitis No family hx of      Cancer - colorectal No family hx of      Macular Degeneration No family hx of       "Cancer No family hx of         No known family hx of skin cancer     Melanoma No family hx of      Skin Cancer No family hx of          Exam:  /66 (BP Location: Right arm, Patient Position: Sitting, Cuff Size: Adult Regular)   Pulse 68   Ht 1.702 m (5' 7\")   SpO2 95%   BMI 24.35 kg/m          ASSESSMENT  1 frequent falls with muscle weakness foot drop and deconditioning  2 LUTS    3 history colon cancer S/P laparoscopic resection 2019  4 hypertension well controlled  5 hyperlipidemia on atorvastatin  6 renal insufficiency slight improvement  7 anemia secondary to above  8 paroxysmal atrial fibrillation on warfarin  9 peripheral arterial disease  10 monoclonal gammopathy stable  11 Hip fracture with nailing 3/14/2019  12 CAD S/P CABGx2 with JEREMY  13 V Tach S/P ICD  14 osteoporosis off alendronate  15 venous insufficiency with history  DVT  16 History neuropathy and gait disorder associated with foot drop left leg    Plan  Refer for home therapy as well as home PT and OT evaluations.  Is clearly homebound and needs assistance.  Will assess his CMP and CBC at the time of his next blood draw.  We will hold the atorvastatin and see if this helps with some of his muscular weakness.    This note was completed using Dragon voice recognition software.      Roberto Sarmiento MD  General Internal Medicine  [facility]  [phone]        "

## 2022-04-15 NOTE — NURSING NOTE
Noe Florence is a 86 year old male patient that presents today in clinic for the following:    Chief Complaint   Patient presents with     RECHECK     The patient's allergies and medications were reviewed as noted. A set of vitals were recorded as noted without incident. The patient does not have any other questions for the provider.    Dariusz Conner, EMT at 2:44 PM on 4/15/2022

## 2022-04-26 ENCOUNTER — OFFICE VISIT (OUTPATIENT)
Dept: NEUROLOGY | Facility: CLINIC | Age: 87
End: 2022-04-26
Payer: COMMERCIAL

## 2022-04-26 VITALS — SYSTOLIC BLOOD PRESSURE: 111 MMHG | HEART RATE: 64 BPM | TEMPERATURE: 97.9 F | DIASTOLIC BLOOD PRESSURE: 60 MMHG

## 2022-04-26 DIAGNOSIS — G63 NEUROPATHY ASSOCIATED WITH MGUS (H): ICD-10-CM

## 2022-04-26 DIAGNOSIS — D47.2 NEUROPATHY ASSOCIATED WITH MGUS (H): ICD-10-CM

## 2022-04-26 NOTE — PROGRESS NOTES
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 He has an known IgM MGUS.    Interval history:  We last saw him 3/23/21. Fall 1 month ago, indoor, his legs gave out from under him. He has had reduced mobility since then- legs seem weaker. He had been able to walk 10-15 minutes without stopping, and now he is walking less than a minute, just for trips to the bathroom etc.  They are considering home health care. He continues to ambulate with a walker, though uses wheelchairs for longer outings. Prior to that his last fall was 2019 when he fell on the ice and fractured a femur. Verbal communication remains fragmented, he often does not respond or verbalize.  They present for follow up given the monoclonal protein. They also report that he has had occasional repetitive hand movements and freezing when walking or attempting to transition from the shower.     Prior pertinent laboratory work-up:  03/21: Serum IF showed monoclonal IgM kappa 150, last checked 03/2021.   3/19: Serum IF showed monoclonal IgM kappa. IgM 111.   3/18: MAG negative. Urine IF showed no monoclonal protein.   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 IgM 113.     Prior pertinent radiology work-up:  2015: CT LS spine showed degenerative changes resulting in mild to moderate spinal canal narrowing from L2-L5.    Prior electrophysiologic work-up:  6/14: Nerve conduction studies/EMG performed at [facility] showed a left common peroneal neuropathy vs L5 radiculopathy. 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   4/18: NCS/EMG showed multiple findings. See report for details.      Past Medical History:   Past Medical History:   Diagnosis Date     Advanced open-angle glaucoma      Atrial fibrillation (H)       CKD (chronic kidney disease), stage III (H) 2005     Colon cancer (H)     Stage II-B colon cancer     Coronary artery disease     s/p CABG x 2, JEREMY x 2     Diverticulitis      Hyperlipidemia      Hypertension      Ischemic cardiomyopathy      MGUS (monoclonal gammopathy of unknown significance)      Nonsenile cataract     BE     Osteoporosis     left hip fracture     Polymorphic ventricular tachycardia (H)      Polymyalgia rheumatica (H)      PVD (posterior vitreous detachment), both eyes 2005     S/P ablation of atrial flutter 6/20/14    CTI     Stented coronary artery      SVT (supraventricular tachycardia) (H)     PPM/AICD for NSVT     Upper leg DVT (deep venous thromboembolism), chronic (H)     Left     Weight loss, unintentional 2017    15 lb in 4 months     Past Surgical History:  Past Surgical History:   Procedure Laterality Date     CATARACT IOL, RT/LT Bilateral      COLONOSCOPY  3/13/2014    Procedure: COMBINED COLONOSCOPY, SINGLE BIOPSY/POLYPECTOMY BY BIOPSY;;  Surgeon: Mary Gerber MD;  Location:  GI     COLONOSCOPY N/A 6/22/2015    Procedure: COLONOSCOPY;  Surgeon: Marilin Newman MD;  Location:  GI     COLONOSCOPY N/A 11/7/2018    Procedure: COMBINED COLONOSCOPY, SINGLE OR MULTIPLE BIOPSY/POLYPECTOMY BY BIOPSY;  Surgeon: Roberto Esteban MD;  Location:  GI     EP ICD GENERATOR REPLACEMENT DUAL N/A 12/11/2018    Procedure: EP ICD Generator Change Dual;  Surgeon: Deedee aBird MD;  Location:  HEART CARDIAC CATH LAB     H ABLATION ATRIAL FLUTTER       HEART CATH DRUG ELUTING STENT PLACEMENT  4/19/2012    JEREMY x 2 to LCx     IMPLANT IMPLANTABLE CARDIOVERTER DEFIBRILLATOR  5-    ppm/aicd     KNEE SURGERY      right and left knee surgeries     LAPAROSCOPIC ASSISTED COLECTOMY Right 2/1/2019    Procedure: Laparoscopic Converted to Open Transverse Colectomy with Lysis of Adhesions;  Surgeon: Chanelle Guzmán MD;  Location: [facility] OR     LAPAROSCOPIC ASSISTED  COLECTOMY LEFT (DESCENDING)  4/8/2014    Procedure: LAPAROSCOPIC ASSISTED COLECTOMY LEFT (DESCENDING);  Hand assisted Laparoscopic Sigmoid Colectomy , Laparoscopic mobilization of spleenic flexure *Latex Allergy*Anesthesia General with Block;  Surgeon: Chanelle Guzmán MD;  Location: [facility] OR     OPEN REDUCTION INTERNAL FIXATION RODDING INTRAMEDULLAR FEMUR FRACTURE TABLE Left 3/14/2019    Procedure: Open Reduction Internal Fixation Left Femur Intramedullar Nailing;  Surgeon: Srikanth Avalos MD;  Location: [facility] OR     REPAIR VALVE MITRAL  2006     30-mm Medtronic Julian ring      SELECTIVE LASER TRABECULOPLASTY (SLT) OD (RIGHT EYE)  4/10, 1/12,+1/9/13    RE     SELECTIVE LASER TRABECULOPLASTY (SLT) OS (LEFT EYE)  5/2012     SHOULDER SURGERY      right rotator cuff     ZZC CABG, ARTERY-VEIN, FOUR  2006    LIMA-LAD, SVG-Rt PDA, SVG-OM2, SVG-Diag 1     ZZC CABG, VEIN, SINGLE  1994    1-vessel CABG, SVG->PDRCA      Family history:    There is no known family history of hereditary neuropathies or other neuromuscular disorders.    Social History:    He denies tobacco, alcohol, or illicit drug use.     Medical Allergies:     Allergies   Allergen Reactions     Latex Rash     Rash     Current Medications:   Current Outpatient Medications   Medication     alendronate (FOSAMAX) 70 MG tablet     ARIPiprazole (ABILIFY) 2 MG tablet     calcium carbonate 500 mg, elemental, (OSCAL;OYSTER SHELL CALCIUM) 500 MG tablet     cholecalciferol 1000 units TABS     COMPRESSION STOCKINGS     Cyanocobalamin (VITAMIN B-12 CR PO)     ferrous sulfate (FE TABS) 325 (65 Fe) MG EC tablet     ketoconazole (NIZORAL) 2 % external cream     metoprolol tartrate (LOPRESSOR) 25 MG tablet     mirtazapine (REMERON) 15 MG tablet     Multiple Vitamins-Minerals (MULTIVITAMIN ADULT PO)     sertraline (ZOLOFT) 100 MG tablet     tamsulosin (FLOMAX) 0.4 MG capsule     tiZANidine (ZANAFLEX) 2 MG tablet     warfarin ANTICOAGULANT (COUMADIN) 5 MG tablet      No current facility-administered medications for this visit.     Review of Systems: A review of systems was obtained and was negative except for what was noted above.    Physical examination:    /60   Pulse 64   Temp 97.9  F (36.6  C) (Temporal)     General Appearance: NAD    Musculoskeletal:  Pes cavus present.     Neurologic examination:    Mental status:  Patient is alert, attentive.   Language was not formally assessed and spontaneous speech is limited. He is able to comply with verbal commands and does respond appropriately to questions.  Bilateral grasp reflex is present.      Cranial nerves:  Extraocular movements were full. There was no face, jaw, palate or tongue weakness or atrophy.      Motor exam: Tone ok.    Right Left   Arm abduction* At least 4/5 5/5   Elbow Flex 5/5 5/5   Elbow Extension 4/5 4/5   Wrist Extension 5/5 5/5   FDI  4+/5 4+/5   APB 5/5 5/5     5/5 5/5   Hip FLexion 4/5 4/5   Knee Flexion At least 4/5 At least 4/5   Knee Extension At least 4/5 At least 4/5   Dorsiflexion  5/5 3/5   * limited due to remote R arm fracture    Complex motor skills: Mild postural hand tremor.      Sensory exam: Pin decreased in left peroneal distribution. Vibration mildly reduced for age in toes > ankles. Vibration: 6 seconds bilateral ankles.     Gait: unable to assess today    Deep tendon reflexes:   Right Left   Triceps 2 2   Biceps 2 2   Brachioradialis 2 2   Knee jerk 0 0   Ankle jerk 0 0      Assessment:    86 year old man with a multifactorial gait disorder with contributions from age, neuropathy and peroneal neuropathy. The neuropathy is probable stable today to modestly worse. Management remains supportive. Additionally family also points out tremor and times when he has difficulty moving. Suspicion for extrapyramidal disorder low, but hard to exclude. Family will continue to monitor those symptoms and I can offer a referral to our movement disorders colleagues if symptoms are  persistent or become more suspicious. All questions answered.     Plan:      1. IgM MGUS: Serum immunofixation and IgM for surveillance today. Also given tremor will check MAG ab again.   2. Balance and falls: Continue Physical therapy  3. Peroneal neuropathy: Continue use of left AFO. Appreciate input from orthotics on this. Peroneal neuropathy is stable.   4. Follow up in about 12 months. Sooner if needed.     Avery Sierra MD  Neurophysiology Fellow     This patient was seen and discussed with Dr. Duarte. His addendum follows.   ---  ADDENDUM:  I personally interviewed and examined the patient. I agree with the history, physical, assessment, and plan as documented above. Changes to the physical examination, assessment and plan have been incorporated into the note by myself, as to make it a single cohesive document.    Jase Duarte MD

## 2022-04-26 NOTE — LETTER
4/26/2022     RE: Noe Florence  [address] [facility] [address]     Dear Colleague,    Thank you for referring your patient, Noe Florence, to the [facility] at [facility]. Please see a copy of my visit note below.    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 He has an known IgM MGUS.    Interval history:  We last saw him 3/23/21. Fall 1 month ago, indoor, his legs gave out from under him. He has had reduced mobility since then- legs seem weaker. He had been able to walk 10-15 minutes without stopping, and now he is walking less than a minute, just for trips to the bathroom etc.  They are considering home health care. He continues to ambulate with a walker, though uses wheelchairs for longer outings. Prior to that his last fall was 2019 when he fell on the ice and fractured a femur. Verbal communication remains fragmented, he often does not respond or verbalize.  They present for follow up given the monoclonal protein. They also report that he has had occasional repetitive hand movements and freezing when walking or attempting to transition from the shower.     Prior pertinent laboratory work-up:  03/21: Serum IF showed monoclonal IgM kappa 150, last checked 03/2021.   3/19: Serum IF showed monoclonal IgM kappa. IgM 111.   3/18: MAG negative. Urine IF showed no monoclonal protein.   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 IgM 113.     Prior pertinent radiology work-up:  2015: CT LS spine showed degenerative changes resulting in mild to moderate spinal canal narrowing from L2-L5.    Prior electrophysiologic work-up:  6/14: Nerve conduction studies/EMG performed at [facility] showed a left common peroneal neuropathy vs L5 radiculopathy.  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   4/18: NCS/EMG showed multiple findings. See report for details.      Past Medical History:   Past Medical History:   Diagnosis Date     Advanced open-angle glaucoma      Atrial fibrillation (H)      CKD (chronic kidney disease), stage III (H) 2005     Colon cancer (H)     Stage II-B colon cancer     Coronary artery disease     s/p CABG x 2, JEREMY x 2     Diverticulitis      Hyperlipidemia      Hypertension      Ischemic cardiomyopathy      MGUS (monoclonal gammopathy of unknown significance)      Nonsenile cataract     BE     Osteoporosis     left hip fracture     Polymorphic ventricular tachycardia (H)      Polymyalgia rheumatica (H)      PVD (posterior vitreous detachment), both eyes 2005     S/P ablation of atrial flutter 6/20/14    CTI     Stented coronary artery      SVT (supraventricular tachycardia) (H)     PPM/AICD for NSVT     Upper leg DVT (deep venous thromboembolism), chronic (H)     Left     Weight loss, unintentional 2017    15 lb in 4 months     Past Surgical History:  Past Surgical History:   Procedure Laterality Date     CATARACT IOL, RT/LT Bilateral      COLONOSCOPY  3/13/2014    Procedure: COMBINED COLONOSCOPY, SINGLE BIOPSY/POLYPECTOMY BY BIOPSY;;  Surgeon: Mary Gerber MD;  Location:  GI     COLONOSCOPY N/A 6/22/2015    Procedure: COLONOSCOPY;  Surgeon: Marilin Newman MD;  Location:  GI     COLONOSCOPY N/A 11/7/2018    Procedure: COMBINED COLONOSCOPY, SINGLE OR MULTIPLE BIOPSY/POLYPECTOMY BY BIOPSY;  Surgeon: Roberto Esteban MD;  Location:  GI     EP ICD GENERATOR REPLACEMENT DUAL N/A 12/11/2018    Procedure: EP ICD Generator Change Dual;  Surgeon: Deedee Baird MD;  Location:  HEART CARDIAC CATH LAB     H ABLATION ATRIAL FLUTTER       HEART CATH DRUG ELUTING STENT PLACEMENT  4/19/2012    JEREMY x 2 to LCx     IMPLANT IMPLANTABLE CARDIOVERTER DEFIBRILLATOR  5-     ppm/aicd     KNEE SURGERY      right and left knee surgeries     LAPAROSCOPIC ASSISTED COLECTOMY Right 2/1/2019    Procedure: Laparoscopic Converted to Open Transverse Colectomy with Lysis of Adhesions;  Surgeon: Chanelle Guzmán MD;  Location: [facility] OR     LAPAROSCOPIC ASSISTED COLECTOMY LEFT (DESCENDING)  4/8/2014    Procedure: LAPAROSCOPIC ASSISTED COLECTOMY LEFT (DESCENDING);  Hand assisted Laparoscopic Sigmoid Colectomy , Laparoscopic mobilization of spleenic flexure *Latex Allergy*Anesthesia General with Block;  Surgeon: Chanelle Guzmán MD;  Location: [facility] OR     OPEN REDUCTION INTERNAL FIXATION RODDING INTRAMEDULLAR FEMUR FRACTURE TABLE Left 3/14/2019    Procedure: Open Reduction Internal Fixation Left Femur Intramedullar Nailing;  Surgeon: Srikanth Avalos MD;  Location: [facility] OR     REPAIR VALVE MITRAL  2006     30-mm Medtronic Julian ring      SELECTIVE LASER TRABECULOPLASTY (SLT) OD (RIGHT EYE)  4/10, 1/12,+1/9/13    RE     SELECTIVE LASER TRABECULOPLASTY (SLT) OS (LEFT EYE)  5/2012     SHOULDER SURGERY      right rotator cuff     ZZC CABG, ARTERY-VEIN, FOUR  2006    LIMA-LAD, SVG-Rt PDA, SVG-OM2, SVG-Diag 1     ZZC CABG, VEIN, SINGLE  1994    1-vessel CABG, SVG->PDRCA      Family history:    There is no known family history of hereditary neuropathies or other neuromuscular disorders.    Social History:    He denies tobacco, alcohol, or illicit drug use.     Medical Allergies:     Allergies   Allergen Reactions     Latex Rash     Rash     Current Medications:   Current Outpatient Medications   Medication     alendronate (FOSAMAX) 70 MG tablet     ARIPiprazole (ABILIFY) 2 MG tablet     calcium carbonate 500 mg, elemental, (OSCAL;OYSTER SHELL CALCIUM) 500 MG tablet     cholecalciferol 1000 units TABS     COMPRESSION STOCKINGS     Cyanocobalamin (VITAMIN B-12 CR PO)     ferrous sulfate (FE TABS) 325 (65 Fe) MG EC tablet     ketoconazole (NIZORAL) 2 % external cream     metoprolol  tartrate (LOPRESSOR) 25 MG tablet     mirtazapine (REMERON) 15 MG tablet     Multiple Vitamins-Minerals (MULTIVITAMIN ADULT PO)     sertraline (ZOLOFT) 100 MG tablet     tamsulosin (FLOMAX) 0.4 MG capsule     tiZANidine (ZANAFLEX) 2 MG tablet     warfarin ANTICOAGULANT (COUMADIN) 5 MG tablet     No current facility-administered medications for this visit.     Review of Systems: A review of systems was obtained and was negative except for what was noted above.    Physical examination:    /60   Pulse 64   Temp 97.9  F (36.6  C) (Temporal)     General Appearance: NAD    Musculoskeletal:  Pes cavus present.     Neurologic examination:    Mental status:  Patient is alert, attentive.   Language was not formally assessed and spontaneous speech is limited. He is able to comply with verbal commands and does respond appropriately to questions.  Bilateral grasp reflex is present.      Cranial nerves:  Extraocular movements were full. There was no face, jaw, palate or tongue weakness or atrophy.      Motor exam: Tone ok.    Right Left   Arm abduction* At least 4/5 5/5   Elbow Flex 5/5 5/5   Elbow Extension 4/5 4/5   Wrist Extension 5/5 5/5   FDI  4+/5 4+/5   APB 5/5 5/5     5/5 5/5   Hip FLexion 4/5 4/5   Knee Flexion At least 4/5 At least 4/5   Knee Extension At least 4/5 At least 4/5   Dorsiflexion  5/5 3/5   * limited due to remote R arm fracture    Complex motor skills: Mild postural hand tremor.      Sensory exam: Pin decreased in left peroneal distribution. Vibration mildly reduced for age in toes > ankles. Vibration: 6 seconds bilateral ankles.     Gait: unable to assess today    Deep tendon reflexes:   Right Left   Triceps 2 2   Biceps 2 2   Brachioradialis 2 2   Knee jerk 0 0   Ankle jerk 0 0      Assessment:    86 year old man with a multifactorial gait disorder with contributions from age, neuropathy and peroneal neuropathy. The neuropathy is probable stable today to modestly worse. Management remains  supportive. Additionally family also points out tremor and times when he has difficulty moving. Suspicion for extrapyramidal disorder low, but hard to exclude. Family will continue to monitor those symptoms and I can offer a referral to our movement disorders colleagues if symptoms are persistent or become more suspicious. All questions answered.     Plan:      1. IgM MGUS: Serum immunofixation and IgM for surveillance today. Also given tremor will check MAG ab again.   2. Balance and falls: Continue Physical therapy  3. Peroneal neuropathy: Continue use of left AFO. Appreciate input from orthotics on this. Peroneal neuropathy is stable.   4. Follow up in about 12 months. Sooner if needed.     Avery Sierra MD  Neurophysiology Fellow     This patient was seen and discussed with Dr. Duarte. His addendum follows.   ---  ADDENDUM:  I personally interviewed and examined the patient. I agree with the history, physical, assessment, and plan as documented above. Changes to the physical examination, assessment and plan have been incorporated into the note by myself, as to make it a single cohesive document.    Jase Duarte MD

## 2022-04-27 ENCOUNTER — LAB (OUTPATIENT)
Dept: LAB | Facility: CLINIC | Age: 87
End: 2022-04-27
Payer: COMMERCIAL

## 2022-04-27 ENCOUNTER — TELEPHONE (OUTPATIENT)
Dept: INTERNAL MEDICINE | Facility: CLINIC | Age: 87
End: 2022-04-27

## 2022-04-27 ENCOUNTER — ANTICOAGULATION THERAPY VISIT (OUTPATIENT)
Dept: ANTICOAGULATION | Facility: CLINIC | Age: 87
End: 2022-04-27

## 2022-04-27 ENCOUNTER — ANCILLARY PROCEDURE (OUTPATIENT)
Dept: CARDIOLOGY | Facility: CLINIC | Age: 87
End: 2022-04-27
Attending: INTERNAL MEDICINE
Payer: COMMERCIAL

## 2022-04-27 DIAGNOSIS — G63 NEUROPATHY ASSOCIATED WITH MGUS (H): ICD-10-CM

## 2022-04-27 DIAGNOSIS — I48.91 ATRIAL FIBRILLATION, UNSPECIFIED TYPE (H): ICD-10-CM

## 2022-04-27 DIAGNOSIS — I48.91 ATRIAL FIBRILLATION (H): ICD-10-CM

## 2022-04-27 DIAGNOSIS — Z79.01 LONG TERM CURRENT USE OF ANTICOAGULANT THERAPY: ICD-10-CM

## 2022-04-27 DIAGNOSIS — D47.2 NEUROPATHY ASSOCIATED WITH MGUS (H): ICD-10-CM

## 2022-04-27 DIAGNOSIS — I82.409 DEEP VEIN THROMBOSIS (DVT) (H): ICD-10-CM

## 2022-04-27 DIAGNOSIS — I48.0 PAROXYSMAL ATRIAL FIBRILLATION (H): ICD-10-CM

## 2022-04-27 DIAGNOSIS — R29.6 FALLS FREQUENTLY: ICD-10-CM

## 2022-04-27 DIAGNOSIS — D64.9 ANEMIA, UNSPECIFIED TYPE: ICD-10-CM

## 2022-04-27 DIAGNOSIS — Z95.810 ICD (IMPLANTABLE CARDIOVERTER-DEFIBRILLATOR) IN PLACE: ICD-10-CM

## 2022-04-27 DIAGNOSIS — I48.91 ATRIAL FIBRILLATION (H): Primary | ICD-10-CM

## 2022-04-27 DIAGNOSIS — I47.20 VENTRICULAR TACHYARRHYTHMIA (H): ICD-10-CM

## 2022-04-27 LAB
ALBUMIN SERPL-MCNC: 3.1 G/DL (ref 3.4–5)
ALP SERPL-CCNC: 96 U/L (ref 40–150)
ALT SERPL W P-5'-P-CCNC: 21 U/L (ref 0–70)
ANION GAP SERPL CALCULATED.3IONS-SCNC: 5 MMOL/L (ref 3–14)
AST SERPL W P-5'-P-CCNC: 18 U/L (ref 0–45)
BASOPHILS # BLD AUTO: 0 10E3/UL (ref 0–0.2)
BASOPHILS NFR BLD AUTO: 0 %
BILIRUB SERPL-MCNC: 0.3 MG/DL (ref 0.2–1.3)
BUN SERPL-MCNC: 50 MG/DL (ref 7–30)
CALCIUM SERPL-MCNC: 9.4 MG/DL (ref 8.5–10.1)
CHLORIDE BLD-SCNC: 109 MMOL/L (ref 94–109)
CO2 SERPL-SCNC: 26 MMOL/L (ref 20–32)
CREAT SERPL-MCNC: 1.37 MG/DL (ref 0.66–1.25)
EOSINOPHIL # BLD AUTO: 0.3 10E3/UL (ref 0–0.7)
EOSINOPHIL NFR BLD AUTO: 3 %
ERYTHROCYTE [DISTWIDTH] IN BLOOD BY AUTOMATED COUNT: 13.7 % (ref 10–15)
GFR SERPL CREATININE-BSD FRML MDRD: 50 ML/MIN/1.73M2
GLUCOSE BLD-MCNC: 79 MG/DL (ref 70–99)
HCT VFR BLD AUTO: 31.4 % (ref 40–53)
HGB BLD-MCNC: 9.9 G/DL (ref 13.3–17.7)
IMM GRANULOCYTES # BLD: 0 10E3/UL
IMM GRANULOCYTES NFR BLD: 0 %
INR BLD: 3.2 (ref 0.9–1.1)
LYMPHOCYTES # BLD AUTO: 0.7 10E3/UL (ref 0.8–5.3)
LYMPHOCYTES NFR BLD AUTO: 9 %
MCH RBC QN AUTO: 29.3 PG (ref 26.5–33)
MCHC RBC AUTO-ENTMCNC: 31.5 G/DL (ref 31.5–36.5)
MCV RBC AUTO: 93 FL (ref 78–100)
MDC_IDC_EPISODE_DTM: NORMAL
MDC_IDC_EPISODE_DURATION: NORMAL S
MDC_IDC_EPISODE_ID: 72
MDC_IDC_EPISODE_TYPE: NORMAL
MDC_IDC_LEAD_IMPLANT_DT: NORMAL
MDC_IDC_LEAD_IMPLANT_DT: NORMAL
MDC_IDC_LEAD_LOCATION: NORMAL
MDC_IDC_LEAD_LOCATION: NORMAL
MDC_IDC_LEAD_LOCATION_DETAIL_1: NORMAL
MDC_IDC_LEAD_LOCATION_DETAIL_1: NORMAL
MDC_IDC_LEAD_MFG: NORMAL
MDC_IDC_LEAD_MFG: NORMAL
MDC_IDC_LEAD_MODEL: NORMAL
MDC_IDC_LEAD_MODEL: NORMAL
MDC_IDC_LEAD_POLARITY_TYPE: NORMAL
MDC_IDC_LEAD_POLARITY_TYPE: NORMAL
MDC_IDC_LEAD_SERIAL: NORMAL
MDC_IDC_LEAD_SERIAL: NORMAL
MDC_IDC_MSMT_BATTERY_DTM: NORMAL
MDC_IDC_MSMT_BATTERY_REMAINING_LONGEVITY: 69 MO
MDC_IDC_MSMT_BATTERY_RRT_TRIGGER: 2.73
MDC_IDC_MSMT_BATTERY_STATUS: NORMAL
MDC_IDC_MSMT_BATTERY_VOLTAGE: 2.98 V
MDC_IDC_MSMT_CAP_CHARGE_DTM: NORMAL
MDC_IDC_MSMT_CAP_CHARGE_ENERGY: 18 J
MDC_IDC_MSMT_CAP_CHARGE_TIME: 3.8
MDC_IDC_MSMT_CAP_CHARGE_TYPE: NORMAL
MDC_IDC_MSMT_LEADCHNL_RA_IMPEDANCE_VALUE: 456 OHM
MDC_IDC_MSMT_LEADCHNL_RA_PACING_THRESHOLD_AMPLITUDE: 0.5 V
MDC_IDC_MSMT_LEADCHNL_RA_PACING_THRESHOLD_PULSEWIDTH: 0.4 MS
MDC_IDC_MSMT_LEADCHNL_RA_SENSING_INTR_AMPL: 0.6 MV
MDC_IDC_MSMT_LEADCHNL_RV_IMPEDANCE_VALUE: 285 OHM
MDC_IDC_MSMT_LEADCHNL_RV_IMPEDANCE_VALUE: 342 OHM
MDC_IDC_MSMT_LEADCHNL_RV_PACING_THRESHOLD_AMPLITUDE: 1 V
MDC_IDC_MSMT_LEADCHNL_RV_PACING_THRESHOLD_PULSEWIDTH: 0.4 MS
MDC_IDC_MSMT_LEADCHNL_RV_SENSING_INTR_AMPL: 7.4 MV
MDC_IDC_PG_IMPLANT_DTM: NORMAL
MDC_IDC_PG_MFG: NORMAL
MDC_IDC_PG_MODEL: NORMAL
MDC_IDC_PG_SERIAL: NORMAL
MDC_IDC_PG_TYPE: NORMAL
MDC_IDC_SESS_CLINIC_NAME: NORMAL
MDC_IDC_SESS_DTM: NORMAL
MDC_IDC_SESS_TYPE: NORMAL
MDC_IDC_SET_BRADY_AT_MODE_SWITCH_RATE: 171 {BEATS}/MIN
MDC_IDC_SET_BRADY_HYSTRATE: NORMAL
MDC_IDC_SET_BRADY_LOWRATE: 60 {BEATS}/MIN
MDC_IDC_SET_BRADY_MAX_SENSOR_RATE: 130 {BEATS}/MIN
MDC_IDC_SET_BRADY_MAX_TRACKING_RATE: 130 {BEATS}/MIN
MDC_IDC_SET_BRADY_MODE: NORMAL
MDC_IDC_SET_BRADY_PAV_DELAY_LOW: 180 MS
MDC_IDC_SET_BRADY_SAV_DELAY_LOW: 150 MS
MDC_IDC_SET_LEADCHNL_RA_PACING_AMPLITUDE: 1.5 V
MDC_IDC_SET_LEADCHNL_RA_PACING_ANODE_ELECTRODE_1: NORMAL
MDC_IDC_SET_LEADCHNL_RA_PACING_ANODE_LOCATION_1: NORMAL
MDC_IDC_SET_LEADCHNL_RA_PACING_CAPTURE_MODE: NORMAL
MDC_IDC_SET_LEADCHNL_RA_PACING_CATHODE_ELECTRODE_1: NORMAL
MDC_IDC_SET_LEADCHNL_RA_PACING_CATHODE_LOCATION_1: NORMAL
MDC_IDC_SET_LEADCHNL_RA_PACING_POLARITY: NORMAL
MDC_IDC_SET_LEADCHNL_RA_PACING_PULSEWIDTH: 0.4 MS
MDC_IDC_SET_LEADCHNL_RA_SENSING_ANODE_ELECTRODE_1: NORMAL
MDC_IDC_SET_LEADCHNL_RA_SENSING_ANODE_LOCATION_1: NORMAL
MDC_IDC_SET_LEADCHNL_RA_SENSING_CATHODE_ELECTRODE_1: NORMAL
MDC_IDC_SET_LEADCHNL_RA_SENSING_CATHODE_LOCATION_1: NORMAL
MDC_IDC_SET_LEADCHNL_RA_SENSING_POLARITY: NORMAL
MDC_IDC_SET_LEADCHNL_RA_SENSING_SENSITIVITY: 0.3 MV
MDC_IDC_SET_LEADCHNL_RV_PACING_AMPLITUDE: 2 V
MDC_IDC_SET_LEADCHNL_RV_PACING_ANODE_ELECTRODE_1: NORMAL
MDC_IDC_SET_LEADCHNL_RV_PACING_ANODE_LOCATION_1: NORMAL
MDC_IDC_SET_LEADCHNL_RV_PACING_CAPTURE_MODE: NORMAL
MDC_IDC_SET_LEADCHNL_RV_PACING_CATHODE_ELECTRODE_1: NORMAL
MDC_IDC_SET_LEADCHNL_RV_PACING_CATHODE_LOCATION_1: NORMAL
MDC_IDC_SET_LEADCHNL_RV_PACING_POLARITY: NORMAL
MDC_IDC_SET_LEADCHNL_RV_PACING_PULSEWIDTH: 0.4 MS
MDC_IDC_SET_LEADCHNL_RV_SENSING_ANODE_ELECTRODE_1: NORMAL
MDC_IDC_SET_LEADCHNL_RV_SENSING_ANODE_LOCATION_1: NORMAL
MDC_IDC_SET_LEADCHNL_RV_SENSING_CATHODE_ELECTRODE_1: NORMAL
MDC_IDC_SET_LEADCHNL_RV_SENSING_CATHODE_LOCATION_1: NORMAL
MDC_IDC_SET_LEADCHNL_RV_SENSING_POLARITY: NORMAL
MDC_IDC_SET_LEADCHNL_RV_SENSING_SENSITIVITY: 0.45 MV
MDC_IDC_SET_ZONE_DETECTION_BEATS_DENOMINATOR: 24 {BEATS}
MDC_IDC_SET_ZONE_DETECTION_BEATS_NUMERATOR: 18 {BEATS}
MDC_IDC_SET_ZONE_DETECTION_INTERVAL: 300 MS
MDC_IDC_SET_ZONE_DETECTION_INTERVAL: 320 MS
MDC_IDC_SET_ZONE_DETECTION_INTERVAL: 350 MS
MDC_IDC_SET_ZONE_DETECTION_INTERVAL: 430 MS
MDC_IDC_SET_ZONE_DETECTION_INTERVAL: NORMAL
MDC_IDC_SET_ZONE_TYPE: NORMAL
MDC_IDC_STAT_AT_BURDEN_PERCENT: 0.1 %
MDC_IDC_STAT_AT_DTM_END: NORMAL
MDC_IDC_STAT_AT_DTM_START: NORMAL
MDC_IDC_STAT_BRADY_AP_VP_PERCENT: 41.59 %
MDC_IDC_STAT_BRADY_AP_VS_PERCENT: 32.33 %
MDC_IDC_STAT_BRADY_AS_VP_PERCENT: 13.71 %
MDC_IDC_STAT_BRADY_AS_VS_PERCENT: 12.36 %
MDC_IDC_STAT_BRADY_DTM_END: NORMAL
MDC_IDC_STAT_BRADY_DTM_START: NORMAL
MDC_IDC_STAT_BRADY_RA_PERCENT_PACED: 73.02 %
MDC_IDC_STAT_BRADY_RV_PERCENT_PACED: 54.86 %
MDC_IDC_STAT_EPISODE_RECENT_COUNT: 0
MDC_IDC_STAT_EPISODE_RECENT_COUNT: 1
MDC_IDC_STAT_EPISODE_RECENT_COUNT_DTM_END: NORMAL
MDC_IDC_STAT_EPISODE_RECENT_COUNT_DTM_START: NORMAL
MDC_IDC_STAT_EPISODE_TOTAL_COUNT: 0
MDC_IDC_STAT_EPISODE_TOTAL_COUNT: 5
MDC_IDC_STAT_EPISODE_TOTAL_COUNT: 67
MDC_IDC_STAT_EPISODE_TOTAL_COUNT_DTM_END: NORMAL
MDC_IDC_STAT_EPISODE_TOTAL_COUNT_DTM_START: NORMAL
MDC_IDC_STAT_EPISODE_TYPE: NORMAL
MDC_IDC_STAT_TACHYTHERAPY_ATP_DELIVERED_RECENT: 0
MDC_IDC_STAT_TACHYTHERAPY_ATP_DELIVERED_TOTAL: 0
MDC_IDC_STAT_TACHYTHERAPY_RECENT_DTM_END: NORMAL
MDC_IDC_STAT_TACHYTHERAPY_RECENT_DTM_START: NORMAL
MDC_IDC_STAT_TACHYTHERAPY_SHOCKS_ABORTED_RECENT: 0
MDC_IDC_STAT_TACHYTHERAPY_SHOCKS_ABORTED_TOTAL: 0
MDC_IDC_STAT_TACHYTHERAPY_SHOCKS_DELIVERED_RECENT: 0
MDC_IDC_STAT_TACHYTHERAPY_SHOCKS_DELIVERED_TOTAL: 0
MDC_IDC_STAT_TACHYTHERAPY_TOTAL_DTM_END: NORMAL
MDC_IDC_STAT_TACHYTHERAPY_TOTAL_DTM_START: NORMAL
MONOCYTES # BLD AUTO: 0.8 10E3/UL (ref 0–1.3)
MONOCYTES NFR BLD AUTO: 10 %
NEUTROPHILS # BLD AUTO: 6.5 10E3/UL (ref 1.6–8.3)
NEUTROPHILS NFR BLD AUTO: 78 %
NRBC # BLD AUTO: 0 10E3/UL
NRBC BLD AUTO-RTO: 0 /100
PLATELET # BLD AUTO: 178 10E3/UL (ref 150–450)
POTASSIUM BLD-SCNC: 4.6 MMOL/L (ref 3.4–5.3)
PROT SERPL-MCNC: 7.5 G/DL (ref 6.8–8.8)
RBC # BLD AUTO: 3.38 10E6/UL (ref 4.4–5.9)
SODIUM SERPL-SCNC: 140 MMOL/L (ref 133–144)
WBC # BLD AUTO: 8.4 10E3/UL (ref 4–11)

## 2022-04-27 PROCEDURE — 93283 PRGRMG EVAL IMPLANTABLE DFB: CPT | Performed by: INTERNAL MEDICINE

## 2022-04-27 PROCEDURE — 83550 IRON BINDING TEST: CPT | Performed by: PATHOLOGY

## 2022-04-27 PROCEDURE — 85610 PROTHROMBIN TIME: CPT | Performed by: PATHOLOGY

## 2022-04-27 PROCEDURE — 36415 COLL VENOUS BLD VENIPUNCTURE: CPT | Performed by: PATHOLOGY

## 2022-04-27 PROCEDURE — 80053 COMPREHEN METABOLIC PANEL: CPT | Performed by: PATHOLOGY

## 2022-04-27 PROCEDURE — 83516 IMMUNOASSAY NONANTIBODY: CPT | Mod: 90 | Performed by: PATHOLOGY

## 2022-04-27 PROCEDURE — 82728 ASSAY OF FERRITIN: CPT | Performed by: PATHOLOGY

## 2022-04-27 PROCEDURE — 84238 ASSAY NONENDOCRINE RECEPTOR: CPT | Mod: 90 | Performed by: PATHOLOGY

## 2022-04-27 PROCEDURE — 99000 SPECIMEN HANDLING OFFICE-LAB: CPT | Performed by: PATHOLOGY

## 2022-04-27 PROCEDURE — 86334 IMMUNOFIX E-PHORESIS SERUM: CPT | Mod: 26

## 2022-04-27 PROCEDURE — 85025 COMPLETE CBC W/AUTO DIFF WBC: CPT | Performed by: PATHOLOGY

## 2022-04-27 PROCEDURE — 82784 ASSAY IGA/IGD/IGG/IGM EACH: CPT | Mod: 90 | Performed by: PATHOLOGY

## 2022-04-27 NOTE — PATIENT INSTRUCTIONS
It was a pleasure to see you in clinic today.  Please do not hesitate to call with any questions or concerns.  You are scheduled for a remote transmission on 7/28/22.  We look forward to seeing you in clinic at your next device check in 12 months.    Juanito Woodard, RN  Electrophysiology Nurse Clinician  [facility]    During Business Hours Please Call:  [phone]  After Hours Please Call:  [phone] - select option #4 and ask for job code [phone]

## 2022-04-27 NOTE — TELEPHONE ENCOUNTER
[facility] Call Center    Phone Message    May a detailed message be left on voicemail: yes     Reason for Call: Other: Rica called, requesting Home Health referral to [facility] [place] [phone], [facility] [phone] and [facility] [phone].      Action Taken: Message routed to:  [facility]    Travel Screening: Not Applicable     Referrals faxed.  Ksenia Stiles RN 10:30 AM on 4/28/2022.

## 2022-04-27 NOTE — PROGRESS NOTES
ANTICOAGULATION MANAGEMENT     Noe Florence 86 year old male is on warfarin with supratherapeutic INR result. (Goal INR 1.5-2.5)    Recent labs: (last 7 days)     04/27/22  1447   INR 3.2*       ASSESSMENT       Source(s): Chart Review and Patient/Caregiver Call       Warfarin doses taken: Warfarin taken as instructed    Diet: Decreased greens/vitamin K in diet; plans to resume previous intake.  Rica has not felt well, so wasn't making Bill salads,etc.  She is better and he will go back to eating greens as he usually does.    New illness, injury, or hospitalization: No    Medication/supplement changes: None noted    Signs or symptoms of bleeding or clotting: No    Previous INR: Therapeutic last visit; previously outside of goal range    Additional findings: None       PLAN     Recommended plan for temporary change(s) affecting INR     Dosing Instructions: partial hold then decrease your warfarin dose (6.7% change) with next INR in 2 weeks       Summary  As of 4/27/2022    Full warfarin instructions:  4/27: 2.5 mg; Otherwise 5 mg every day   Next INR check:  5/11/2022             Telephone call with  Rica who agrees to plan and repeated back plan correctly    Lab visit scheduled    Education provided: Please call back if any changes to your diet, medications or how you've been taking warfarin and Contact [phone] with any changes, questions or concerns.     Plan made per [facility] anticoagulation protocol    Danya Edwards RN  Anticoagulation Clinic  4/27/2022    _______________________________________________________________________     Anticoagulation Episode Summary     Current INR goal:  1.5-2.5   TTR:  73.7 % (1 y)   Target end date:  Indefinite   Send INR reminders to:  [facility]    Indications    Atrial fibrillation (H) [I48.91] [I48.91]  Long-term (current) use of anticoagulants [Z79.01] [Z79.01]  Deep vein thrombosis (DVT) (H) [I82.409]  Atrial fibrillation  unspecified type (H)  [I48.91]           Comments:  Hx of Falls/Bleed         Anticoagulation Care Providers     Provider Role Specialty Phone number    Frida Desai MD Referring Cardiovascular Disease [phone]

## 2022-04-28 LAB
IGM SERPL-MCNC: 134 MG/DL (ref 35–242)
PROT PATTERN SERPL IFE-IMP: NORMAL

## 2022-04-29 DIAGNOSIS — D64.9 ANEMIA, UNSPECIFIED TYPE: Primary | ICD-10-CM

## 2022-04-29 LAB
MAG IGM SER IA-ACNC: <1000 TU
SGPG IGM SER-ACNC: 0.07 IV

## 2022-04-30 LAB
FERRITIN SERPL-MCNC: 253 NG/ML (ref 26–388)
IRON SATN MFR SERPL: 18 % (ref 15–46)
IRON SERPL-MCNC: 42 UG/DL (ref 35–180)
TIBC SERPL-MCNC: 234 UG/DL (ref 240–430)

## 2022-05-02 ENCOUNTER — VIRTUAL VISIT (OUTPATIENT)
Dept: CARDIOLOGY | Facility: CLINIC | Age: 87
End: 2022-05-02
Attending: INTERNAL MEDICINE
Payer: COMMERCIAL

## 2022-05-02 ENCOUNTER — MEDICAL CORRESPONDENCE (OUTPATIENT)
Dept: HEALTH INFORMATION MANAGEMENT | Facility: CLINIC | Age: 87
End: 2022-05-02

## 2022-05-02 ENCOUNTER — TELEPHONE (OUTPATIENT)
Dept: INTERNAL MEDICINE | Facility: CLINIC | Age: 87
End: 2022-05-02

## 2022-05-02 DIAGNOSIS — I47.29 PAROXYSMAL VENTRICULAR TACHYCARDIA (H): ICD-10-CM

## 2022-05-02 DIAGNOSIS — I48.0 PAROXYSMAL ATRIAL FIBRILLATION (H): Primary | ICD-10-CM

## 2022-05-02 DIAGNOSIS — D64.9 ANEMIA, UNSPECIFIED TYPE: Primary | ICD-10-CM

## 2022-05-02 DIAGNOSIS — Z95.810 PRESENCE OF IMPLANTABLE CARDIOVERTER-DEFIBRILLATOR (ICD): ICD-10-CM

## 2022-05-02 PROCEDURE — 99213 OFFICE O/P EST LOW 20 MIN: CPT | Mod: 95 | Performed by: INTERNAL MEDICINE

## 2022-05-02 NOTE — PATIENT INSTRUCTIONS
Thank you for coming to the [facility] at [place]; please note the following instructions:    1. Follow up virtually with Dr. Baird in 6 months        If you have any questions regarding your visit, please contact your care team:     CARDIOLOGY  TELEPHONE NUMBER   Sendy TEJEDAAgnieszka, Registered Nurse  Sary VILLAFANA, Registered Nurse  Spakrle GUNTER, Registered Medical Assistant  Cleo WARREN, Visit Facilitator [phone] (select option 1)    *After hours: [phone]   For Scheduling Appts:     [phone] (select option 1)    *After hours: [phone]   For the Device Clinic (Pacemakers and ICD's)  Mary GARCIA, Registered Nurse   During business hours: [phone]    *After business hours:  [phone] (select option 4)      Normal test result notifications will be released via [employer] or mailed within 7 business days.  All other test results, will be communicated via telephone once reviewed by your cardiologist.    If you need a medication refill, please contact your pharmacy.  Please allow 3 business days for your refill to be completed.    As always, thank you for trusting us with your health care needs!

## 2022-05-02 NOTE — LETTER
5/2/2022      RE: Noe Florence  [address] [facility] [id number]       Dear Colleague,    Thank you for the opportunity to participate in the care of your patient, Noe Florence, at the [facility]. Please see a copy of my visit note below.        HPI: Purpose of visit: Follow-up of atrial fibrillation and status post implantation of ICD    Noe Florence is an 85yo M with a past medical history significant for  HTN, HLD, CKD3, CAD s/p CABG x2, DESx2, polymorphic VT s/p ICD placement (5/2012, gen change 12/2018), and AF/AFL s/p CTI (6/2014) and right atrial appendage atrial tachycardia ablation (9/2014). His ICD reached REBECCA and he underwent an ICD gen change on 12/11/2018.    Patient's last visit with me was in May 2021.  In the past 1 year, patient suffered a fall and since then has not been independently ambulatory.  He is currently homebound.  A recent interrogation of the ICD showed an atrial fibrillation episode of 5.5 hours.  Otherwise, patient did not report any symptoms of palpitations, irregular heartbeat sensation, exertional dyspnea, exertional angina, frequent lightheadedness, presyncope or syncope.      PAST MEDICAL HISTORY:  Past Medical History:   Diagnosis Date     Advanced open-angle glaucoma      Atrial fibrillation (H)      CKD (chronic kidney disease), stage III (H) 2005     Colon cancer (H)     Stage II-B colon cancer     Coronary artery disease     s/p CABG x 2, JEREMY x 2     Diverticulitis      Hyperlipidemia      Hypertension      Ischemic cardiomyopathy      MGUS (monoclonal gammopathy of unknown significance)      Nonsenile cataract     BE     Osteoporosis     left hip fracture     Polymorphic ventricular tachycardia (H)      Polymyalgia rheumatica (H)      PVD (posterior vitreous detachment), both eyes 2005     S/P ablation of atrial flutter 6/20/14    CTI     Stented coronary artery      SVT  (supraventricular tachycardia) (H)     PPM/AICD for NSVT     Upper leg DVT (deep venous thromboembolism), chronic (H)     Left     Weight loss, unintentional 2017    15 lb in 4 months       CURRENT MEDICATIONS:  Current Outpatient Medications   Medication Sig Dispense Refill     alendronate (FOSAMAX) 70 MG tablet Take 1 tablet (70 mg) by mouth every 7 days Take with a full glass of water and do not eat or lay down for 30 minutes 12 tablet 3     ARIPiprazole (ABILIFY) 2 MG tablet Take 2 mg by mouth daily       calcium carbonate 500 mg, elemental, (OSCAL;OYSTER SHELL CALCIUM) 500 MG tablet Take 1 tablet (500 mg) by mouth 2 times daily 180 tablet 3     cholecalciferol 1000 units TABS Take 1,000 Units by mouth daily        COMPRESSION STOCKINGS 1 each daily 1 each 4     Cyanocobalamin (VITAMIN B-12 CR PO)        ferrous sulfate (FE TABS) 325 (65 Fe) MG EC tablet Take 325 mg by mouth daily       ketoconazole (NIZORAL) 2 % external cream Apply to the feet 2 times a day until rash clears then 3-4 times a week for maintenance 60 g 11     metoprolol tartrate (LOPRESSOR) 25 MG tablet Take 1 tablet (25 mg) by mouth 2 times daily 180 tablet 3     mirtazapine (REMERON) 15 MG tablet Take 15 mg by mouth At Bedtime.       Multiple Vitamins-Minerals (MULTIVITAMIN ADULT PO)        sertraline (ZOLOFT) 100 MG tablet Take 150 mg by mouth every evening        tamsulosin (FLOMAX) 0.4 MG capsule Take 2 capsules (0.8 mg) by mouth daily 180 capsule 3     tiZANidine (ZANAFLEX) 2 MG tablet Take 1-2 tablets (2-4 mg) by mouth 3 times daily as needed for muscle spasms 30 tablet 0     warfarin ANTICOAGULANT (COUMADIN) 5 MG tablet Take 1 to 1.5 tablets daily or as directed by the Coumadin clinic 90 tablet 4       PAST SURGICAL HISTORY:  Past Surgical History:   Procedure Laterality Date     CATARACT IOL, RT/LT Bilateral      COLONOSCOPY  3/13/2014    Procedure: COMBINED COLONOSCOPY, SINGLE BIOPSY/POLYPECTOMY BY BIOPSY;;  Surgeon: Mary Gerber  Irene Potter MD;  Location:  GI     COLONOSCOPY N/A 6/22/2015    Procedure: COLONOSCOPY;  Surgeon: Marilin Newman MD;  Location:  GI     COLONOSCOPY N/A 11/7/2018    Procedure: COMBINED COLONOSCOPY, SINGLE OR MULTIPLE BIOPSY/POLYPECTOMY BY BIOPSY;  Surgeon: Roberto Esteban MD;  Location:  GI     EP ICD GENERATOR REPLACEMENT DUAL N/A 12/11/2018    Procedure: EP ICD Generator Change Dual;  Surgeon: Deedee Baird MD;  Location:  HEART CARDIAC CATH LAB     H ABLATION ATRIAL FLUTTER       HEART CATH DRUG ELUTING STENT PLACEMENT  4/19/2012    JEREMY x 2 to LCx     IMPLANT IMPLANTABLE CARDIOVERTER DEFIBRILLATOR  5-    ppm/aicd     KNEE SURGERY      right and left knee surgeries     LAPAROSCOPIC ASSISTED COLECTOMY Right 2/1/2019    Procedure: Laparoscopic Converted to Open Transverse Colectomy with Lysis of Adhesions;  Surgeon: Chanelle Guzmán MD;  Location:  OR     LAPAROSCOPIC ASSISTED COLECTOMY LEFT (DESCENDING)  4/8/2014    Procedure: LAPAROSCOPIC ASSISTED COLECTOMY LEFT (DESCENDING);  Hand assisted Laparoscopic Sigmoid Colectomy , Laparoscopic mobilization of spleenic flexure *Latex Allergy*Anesthesia General with Block;  Surgeon: Chanelle Guzmán MD;  Location:  OR     OPEN REDUCTION INTERNAL FIXATION RODDING INTRAMEDULLAR FEMUR FRACTURE TABLE Left 3/14/2019    Procedure: Open Reduction Internal Fixation Left Femur Intramedullar Nailing;  Surgeon: Srikanth Avalos MD;  Location:  OR     REPAIR VALVE MITRAL  2006     30-mm Medtronic Julian ring      SELECTIVE LASER TRABECULOPLASTY (SLT) OD (RIGHT EYE)  4/10, 1/12,+1/9/13    RE     SELECTIVE LASER TRABECULOPLASTY (SLT) OS (LEFT EYE)  5/2012     SHOULDER SURGERY      right rotator cuff     ZZC CABG, ARTERY-VEIN, FOUR  2006    LIMA-LAD, SVG-Rt PDA, SVG-OM2, SVG-Diag 1     ZC CABG, VEIN, SINGLE  1994    1-vessel CABG, SVG->PDRCA        ALLERGIES:     Allergies   Allergen Reactions     Latex Rash     Rash        FAMILY HISTORY:  - Premature coronary artery disease  - Atrial fibrillation  - Sudden cardiac death     SOCIAL HISTORY:  Social History     Tobacco Use     Smoking status: Former Smoker     Types: Cigarettes, Cigars     Quit date: 1968     Years since quittin.3     Smokeless tobacco: Never Used   Substance Use Topics     Alcohol use: Yes     Alcohol/week: 0.0 standard drinks     Comment: 2-3 drinks twice weekly     Drug use: No       ROS:   Constitutional: No fever, chills, or sweats. Weight stable.   ENT: No visual disturbance, ear ache, epistaxis, sore throat.   Cardiovascular: As per HPI.   Respiratory: No cough, hemoptysis.    GI: No nausea, vomiting, hematemesis, melena, or hematochezia.   : No hematuria.   Integument: Negative.   Psychiatric: Negative.   Hematologic:  Easy bruising, no easy bleeding.  Neuro: Negative.   Endocrinology: No significant heat or cold intolerance   Musculoskeletal: No myalgia.    Exam:  There were no vitals taken for this visit.  GENERAL APPEARANCE: healthy, alert and no distress    Labs:  CBC RESULTS:   Lab Results   Component Value Date    WBC 8.4 2022    WBC 6.2 2021    RBC 3.38 (L) 2022    RBC 3.73 (L) 2021    HGB 9.9 (L) 2022    HGB 11.5 (L) 2021    HCT 31.4 (L) 2022    HCT 36.5 (L) 2021    MCV 93 2022    MCV 98 2021    MCH 29.3 2022    MCH 30.8 2021    MCHC 31.5 2022    MCHC 31.5 2021    RDW 13.7 2022    RDW 13.5 2021     2022     (L) 2021       BMP RESULTS:  Lab Results   Component Value Date     2022     2021    POTASSIUM 4.6 2022    POTASSIUM 4.5 2021    CHLORIDE 109 2022    CHLORIDE 113 (H) 2021    CO2 26 2022    CO2 26 2021    ANIONGAP 5 2022    ANIONGAP 5 2021    GLC 79 2022    GLC 93 2021    BUN 50 (H) 2022    BUN 48 (H) 2021    CR 1.37  (H) 04/27/2022    CR 1.49 (H) 05/12/2021    GFRESTIMATED 50 (L) 04/27/2022    GFRESTIMATED 31 (L) 09/18/2021    GFRESTIMATED 42 (L) 05/12/2021    GFRESTBLACK 49 (L) 05/12/2021    KEITH 9.4 04/27/2022    KEITH 9.2 05/12/2021        INR RESULTS:  Lab Results   Component Value Date    INR 3.2 (H) 04/27/2022    INR 2.4 (H) 03/29/2022    INR 2.65 (H) 03/16/2022    INR 1.99 (H) 02/10/2022    INR 2.35 (H) 01/18/2022    INR 2.32 (H) 12/30/2021    INR 1.5 (A) 08/16/2021    INR 2.31 (H) 07/05/2021    INR 1.90 (H) 06/07/2021    INR 1.58 (H) 05/12/2021    INR 1.64 (H) 04/12/2021       Procedures:      Assessment and Plan:     Paroxysmal atrial fibrillation/flutter s/p CTI 2014 and focal AT ablation 2014  Hx of VT status post ICD  CAD s/p CABG and PCI    It is encouraging that patient's arrhythmia burden remains low based on interrogation of the ICD.  Patient also did not report any clinical symptoms of arrhythmias.  We will continue current medications and see patient again by video clinic in approximately 6 months.    All questions and concerns were addressed and patient and his wife are happy with the plan.      CC  Patient Care Team:  Roberto Sarmiento MD as PCP - General (Internal Medicine)  Francisco Gilliam MD as MD (Oncology)  Omero Srinivasan MD as MD (Ophthalmology)  Hay Arnett MD as MD (Internal Medicine)  NIKHIL Wheeler MD as MD (Dermatology)  Deedee aBird MD as MD (Cardiology)  Bhavana Mckeon MD as MD (Internal Medicine)  Naveed aRm MD as Resident  Guzman Boudreaux DPM (Podiatry)  Joshua Centeno MD as MD (Family Practice)  Brett Petersen MD as MD (Dermapathology)  Clarice Magallanes APRN CNP as Nurse Practitioner (Nurse Practitioner - Family)  Lashawn Jackson MD as MD (Dermatology)  Marci Palmer, BYRON as Nurse Coordinator (Oncology)  Lashawn Cool, RN as Specialty Care Coordinator (Cardiology)  Jaye Alexander APRN CNP as Nurse  Practitioner (Nurse Practitioner - Family)  Alona Lowe CNP as Nurse Practitioner (Nurse Practitioner)  Nusrat Mayo, BYRON as Specialty Care Coordinator (Urology)  Octaviano Santos MD as MD (Interventional Cardiology)  Benjamin Fairchild MD as Assigned Musculoskeletal Provider  Matt Vásquez MD as Assigned Surgical Provider  Jase Duarte MD as Assigned Neuroscience Provider  Thalia, Matt Carreon MD as MD (Dermatology)  Cynthia Carrion MD as Assigned Heart and Vascular Provider  Roberto Sarmiento MD as Assigned PCP  Francisco Gilliam MD as Assigned Cancer Care Provider  CYNTHIA CARRION

## 2022-05-02 NOTE — TELEPHONE ENCOUNTER
[facility] Call Center    Phone Message    May a detailed message be left on voicemail: yes     Reason for Call: Order(s): Home Care Orders: Skilled Nursing: delay start of homecare to 5/3 per pt request    Action Taken: Message routed to:  [facility] [place]

## 2022-05-02 NOTE — PROGRESS NOTES
Sha is a 86 year old who is being evaluated via a billable video visit.      How would you like to obtain your AVS? Mail a copy  If the video visit is dropped, the invitation should be resent by: Call [phone]  Will anyone else be joining your video visit? Yes, significant other is in room with patient    Video Start Time: 1:25 PM  Video-Visit Details    Type of service:  Video Visit    Video End Time: 1:34 PM    Originating Location (pt. Location): Home    Distant Location (provider location):  [facility] HEART [facility]     Platform used for Video Visit: [employer]     HPI: Purpose of visit: Follow-up of atrial fibrillation and status post implantation of ICD    Noe Florence is an 87yo M with a past medical history significant for  HTN, HLD, CKD3, CAD s/p CABG x2, DESx2, polymorphic VT s/p ICD placement (5/2012, gen change 12/2018), and AF/AFL s/p CTI (6/2014) and right atrial appendage atrial tachycardia ablation (9/2014). His ICD reached REBECCA and he underwent an ICD gen change on 12/11/2018.    Patient's last visit with me was in May 2021.  In the past 1 year, patient suffered a fall and since then has not been independently ambulatory.  He is currently homebound.  A recent interrogation of the ICD showed an atrial fibrillation episode of 5.5 hours.  Otherwise, patient did not report any symptoms of palpitations, irregular heartbeat sensation, exertional dyspnea, exertional angina, frequent lightheadedness, presyncope or syncope.      PAST MEDICAL HISTORY:  Past Medical History:   Diagnosis Date     Advanced open-angle glaucoma      Atrial fibrillation (H)      CKD (chronic kidney disease), stage III (H) 2005     Colon cancer (H)     Stage II-B colon cancer     Coronary artery disease     s/p CABG x 2, JEREMY x 2     Diverticulitis      Hyperlipidemia      Hypertension      Ischemic cardiomyopathy      MGUS (monoclonal gammopathy of unknown significance)      Nonsenile cataract     BE     Osteoporosis      left hip fracture     Polymorphic ventricular tachycardia (H)      Polymyalgia rheumatica (H)      PVD (posterior vitreous detachment), both eyes 2005     S/P ablation of atrial flutter 6/20/14    CTI     Stented coronary artery      SVT (supraventricular tachycardia) (H)     PPM/AICD for NSVT     Upper leg DVT (deep venous thromboembolism), chronic (H)     Left     Weight loss, unintentional 2017    15 lb in 4 months       CURRENT MEDICATIONS:  Current Outpatient Medications   Medication Sig Dispense Refill     alendronate (FOSAMAX) 70 MG tablet Take 1 tablet (70 mg) by mouth every 7 days Take with a full glass of water and do not eat or lay down for 30 minutes 12 tablet 3     ARIPiprazole (ABILIFY) 2 MG tablet Take 2 mg by mouth daily       calcium carbonate 500 mg, elemental, (OSCAL;OYSTER SHELL CALCIUM) 500 MG tablet Take 1 tablet (500 mg) by mouth 2 times daily 180 tablet 3     cholecalciferol 1000 units TABS Take 1,000 Units by mouth daily        COMPRESSION STOCKINGS 1 each daily 1 each 4     Cyanocobalamin (VITAMIN B-12 CR PO)        ferrous sulfate (FE TABS) 325 (65 Fe) MG EC tablet Take 325 mg by mouth daily       ketoconazole (NIZORAL) 2 % external cream Apply to the feet 2 times a day until rash clears then 3-4 times a week for maintenance 60 g 11     metoprolol tartrate (LOPRESSOR) 25 MG tablet Take 1 tablet (25 mg) by mouth 2 times daily 180 tablet 3     mirtazapine (REMERON) 15 MG tablet Take 15 mg by mouth At Bedtime.       Multiple Vitamins-Minerals (MULTIVITAMIN ADULT PO)        sertraline (ZOLOFT) 100 MG tablet Take 150 mg by mouth every evening        tamsulosin (FLOMAX) 0.4 MG capsule Take 2 capsules (0.8 mg) by mouth daily 180 capsule 3     tiZANidine (ZANAFLEX) 2 MG tablet Take 1-2 tablets (2-4 mg) by mouth 3 times daily as needed for muscle spasms 30 tablet 0     warfarin ANTICOAGULANT (COUMADIN) 5 MG tablet Take 1 to 1.5 tablets daily or as directed by the Coumadin clinic 90 tablet 4        PAST SURGICAL HISTORY:  Past Surgical History:   Procedure Laterality Date     CATARACT IOL, RT/LT Bilateral      COLONOSCOPY  3/13/2014    Procedure: COMBINED COLONOSCOPY, SINGLE BIOPSY/POLYPECTOMY BY BIOPSY;;  Surgeon: Mary Gerber MD;  Location:  GI     COLONOSCOPY N/A 6/22/2015    Procedure: COLONOSCOPY;  Surgeon: Marilin Newman MD;  Location:  GI     COLONOSCOPY N/A 11/7/2018    Procedure: COMBINED COLONOSCOPY, SINGLE OR MULTIPLE BIOPSY/POLYPECTOMY BY BIOPSY;  Surgeon: Roberto Esteban MD;  Location:  GI     EP ICD GENERATOR REPLACEMENT DUAL N/A 12/11/2018    Procedure: EP ICD Generator Change Dual;  Surgeon: Deedee Baird MD;  Location:  HEART CARDIAC CATH LAB     H ABLATION ATRIAL FLUTTER       HEART CATH DRUG ELUTING STENT PLACEMENT  4/19/2012    JEREMY x 2 to LCx     IMPLANT IMPLANTABLE CARDIOVERTER DEFIBRILLATOR  5-    ppm/aicd     KNEE SURGERY      right and left knee surgeries     LAPAROSCOPIC ASSISTED COLECTOMY Right 2/1/2019    Procedure: Laparoscopic Converted to Open Transverse Colectomy with Lysis of Adhesions;  Surgeon: Chanelle Guzmán MD;  Location:  OR     LAPAROSCOPIC ASSISTED COLECTOMY LEFT (DESCENDING)  4/8/2014    Procedure: LAPAROSCOPIC ASSISTED COLECTOMY LEFT (DESCENDING);  Hand assisted Laparoscopic Sigmoid Colectomy , Laparoscopic mobilization of spleenic flexure *Latex Allergy*Anesthesia General with Block;  Surgeon: Chanelle Guzmán MD;  Location:  OR     OPEN REDUCTION INTERNAL FIXATION RODDING INTRAMEDULLAR FEMUR FRACTURE TABLE Left 3/14/2019    Procedure: Open Reduction Internal Fixation Left Femur Intramedullar Nailing;  Surgeon: Srikanth Avalos MD;  Location:  OR     REPAIR VALVE MITRAL  2006     30-mm Medtronic Julian ring      SELECTIVE LASER TRABECULOPLASTY (SLT) OD (RIGHT EYE)  4/10, 1/12,+1/9/13    RE     SELECTIVE LASER TRABECULOPLASTY (SLT) OS (LEFT EYE)  5/2012     SHOULDER SURGERY      right  rotator cuff     [facility] CABG, ARTERY-VEIN, FOUR      LIMA-LAD, SVG-Rt PDA, SVG-OM2, SVG-Diag 1     Z CABG, VEIN, SINGLE      1-vessel CABG, SVG->PDRCA        ALLERGIES:     Allergies   Allergen Reactions     Latex Rash     Rash       FAMILY HISTORY:  - Premature coronary artery disease  - Atrial fibrillation  - Sudden cardiac death     SOCIAL HISTORY:  Social History     Tobacco Use     Smoking status: Former Smoker     Types: Cigarettes, Cigars     Quit date: 1968     Years since quittin.3     Smokeless tobacco: Never Used   Substance Use Topics     Alcohol use: Yes     Alcohol/week: 0.0 standard drinks     Comment: 2-3 drinks twice weekly     Drug use: No       ROS:   Constitutional: No fever, chills, or sweats. Weight stable.   ENT: No visual disturbance, ear ache, epistaxis, sore throat.   Cardiovascular: As per HPI.   Respiratory: No cough, hemoptysis.    GI: No nausea, vomiting, hematemesis, melena, or hematochezia.   : No hematuria.   Integument: Negative.   Psychiatric: Negative.   Hematologic:  Easy bruising, no easy bleeding.  Neuro: Negative.   Endocrinology: No significant heat or cold intolerance   Musculoskeletal: No myalgia.    Exam:  There were no vitals taken for this visit.  GENERAL APPEARANCE: healthy, alert and no distress    Labs:  CBC RESULTS:   Lab Results   Component Value Date    WBC 8.4 2022    WBC 6.2 2021    RBC 3.38 (L) 2022    RBC 3.73 (L) 2021    HGB 9.9 (L) 2022    HGB 11.5 (L) 2021    HCT 31.4 (L) 2022    HCT 36.5 (L) 2021    MCV 93 2022    MCV 98 2021    MCH 29.3 2022    MCH 30.8 2021    MCHC 31.5 2022    MCHC 31.5 2021    RDW 13.7 2022    RDW 13.5 2021     2022     (L) 2021       BMP RESULTS:  Lab Results   Component Value Date     2022     2021    POTASSIUM 4.6 2022    POTASSIUM 4.5 2021    CHLORIDE 109  04/27/2022    CHLORIDE 113 (H) 05/12/2021    CO2 26 04/27/2022    CO2 26 05/12/2021    ANIONGAP 5 04/27/2022    ANIONGAP 5 05/12/2021    GLC 79 04/27/2022    GLC 93 05/12/2021    BUN 50 (H) 04/27/2022    BUN 48 (H) 05/12/2021    CR 1.37 (H) 04/27/2022    CR 1.49 (H) 05/12/2021    GFRESTIMATED 50 (L) 04/27/2022    GFRESTIMATED 31 (L) 09/18/2021    GFRESTIMATED 42 (L) 05/12/2021    GFRESTBLACK 49 (L) 05/12/2021    KEITH 9.4 04/27/2022    KEITH 9.2 05/12/2021        INR RESULTS:  Lab Results   Component Value Date    INR 3.2 (H) 04/27/2022    INR 2.4 (H) 03/29/2022    INR 2.65 (H) 03/16/2022    INR 1.99 (H) 02/10/2022    INR 2.35 (H) 01/18/2022    INR 2.32 (H) 12/30/2021    INR 1.5 (A) 08/16/2021    INR 2.31 (H) 07/05/2021    INR 1.90 (H) 06/07/2021    INR 1.58 (H) 05/12/2021    INR 1.64 (H) 04/12/2021       Procedures:      Assessment and Plan:     Paroxysmal atrial fibrillation/flutter s/p CTI 2014 and focal AT ablation 2014  Hx of VT status post ICD  CAD s/p CABG and PCI    It is encouraging that patient's arrhythmia burden remains low based on interrogation of the ICD.  Patient also did not report any clinical symptoms of arrhythmias.  We will continue current medications and see patient again by video clinic in approximately 6 months.    All questions and concerns were addressed and patient and his wife are happy with the plan.      CC  Patient Care Team:  Roberto Sarmiento MD as PCP - General (Internal Medicine)  Francisco Gilliam MD as MD (Oncology)  Omero Srinivasan MD as MD (Ophthalmology)  Hay Arnett MD as MD (Internal Medicine)  NIKHIL Wheeler MD as MD (Dermatology)  Deedee Baird MD as MD (Cardiology)  Bhavana Mckeon MD as MD (Internal Medicine)  Naveed Ram MD as Resident  Guzman Boudreaux DPM (Podiatry)  Joshua Centeno MD as MD (Family Practice)  Brett Petersen MD as MD (Dermapathology)  Clarice Magallanes APRN CNP as Nurse Practitioner (Nurse  Practitioner - Family)  Lashawn Jackson MD as MD (Dermatology)  Marci Palmer, RN as Nurse Coordinator (Oncology)  Lashawn Cool, RN as Specialty Care Coordinator (Cardiology)  Jaye Alexander APRN CNP as Nurse Practitioner (Nurse Practitioner - Family)  Alona Lowe CNP as Nurse Practitioner (Nurse Practitioner)  Nusrat Mayo, BYRON as Specialty Care Coordinator (Urology)  Octaviano Santos MD as MD (Interventional Cardiology)  Benjamin Fairchild MD as Assigned Musculoskeletal Provider  Matt Vásquez MD as Assigned Surgical Provider  Jase Duarte MD as Assigned Neuroscience Provider  Thalia, Matt Carreon MD as MD (Dermatology)  Cynthia Carrion MD as Assigned Heart and Vascular Provider  Roberto Sarmiento MD as Assigned PCP  Francisco Gilliam MD as Assigned Cancer Care Provider  CYNTHIA CARRION

## 2022-05-02 NOTE — TELEPHONE ENCOUNTER
[facility] Dr Sarmiento, for Skilled Nursing: delay start of homecare to 5/3 per pt request. Saray Osorio LPN 5/2/2022 2:24 PM

## 2022-05-03 ENCOUNTER — MEDICAL CORRESPONDENCE (OUTPATIENT)
Dept: HEALTH INFORMATION MANAGEMENT | Facility: CLINIC | Age: 87
End: 2022-05-03
Payer: COMMERCIAL

## 2022-05-03 ENCOUNTER — TELEPHONE (OUTPATIENT)
Dept: INTERNAL MEDICINE | Facility: CLINIC | Age: 87
End: 2022-05-03
Payer: COMMERCIAL

## 2022-05-03 LAB — STFR SERPL-MCNC: 4.4 MG/L

## 2022-05-03 NOTE — TELEPHONE ENCOUNTER
[facility] Call Center    Phone Message    May a detailed message be left on voicemail: yes     Reason for Call: Order(s): Other:   Reason for requested: Skilled Nursinx a week for 1 week, 1x a week for 3 weeks, Home Health Aide: 1x a week for 4 weeks,  to eval and treat, Dietitian to eval and treat, Order date of next INR, need current Warfarin dosage, order to discontinue tizanidine, order to change vit D 1000 unit twice daily, order to change vit B12 1000 mcg twice daily. Also, potential medication reaction to report between Warfarin and Sertraline, increase bleeding. Please reach out Lisa.   Date needed: ASAP  Provider name: Dr Sarmiento      Action Taken: Message routed to:  [facility] [place]    Travel Screening: Not Applicable

## 2022-05-04 NOTE — TELEPHONE ENCOUNTER
Lisa calling requesting a return call to discuss orders and to see if Dr. Sarmiento has a covering provider that can give orders. Please call to discuss thank you.     As long as the INR is monitored through the INR clinic we do not need to be concerned regarding the drug interaction.  THe vitamin dosing is fine.  JL

## 2022-05-05 NOTE — CONFIDENTIAL NOTE
Spoke to Lisa via telephone. Gave verbal orders. I let her know what his medication dosage of warfarin was based on the last anticoag clinic visit. Confirmed new INR drawn on 5/11.     She reports that Jona hasn't taken tizanidine for some time which is the reason for discontinuation. She's wanting to make sure that PCP is okay with these changes:order to change vit D 1000 unit twice daily, order to change vit B12 1000 mcg twice daily.     She was also wondering if pt should continue to take warfarin and sertraline - potential medication reaction to report between Warfarin and Sertraline, increase bleeding.  Will forward to PCP  Melissa Jimenes RN

## 2022-05-06 NOTE — CONFIDENTIAL NOTE
"Spoke to Lisa and relayed PCP message: \"As long as the INR is monitored through the INR clinic we do not need to be concerned regarding the drug interaction.  THe vitamin dosing is fine.  JL\"  She had no further questions.  Melissa Jimenes RN    "

## 2022-05-09 ENCOUNTER — TELEPHONE (OUTPATIENT)
Dept: INTERNAL MEDICINE | Facility: CLINIC | Age: 87
End: 2022-05-09
Payer: COMMERCIAL

## 2022-05-09 NOTE — TELEPHONE ENCOUNTER
[facility] Call Center    Phone Message    May a detailed message be left on voicemail: yes     Reason for Call: Order(s): Home Care Orders: Physical Therapy (PT): move PT and OT evals to this week due to pt refusing services last week    Action Taken: Message routed to:  [facility] [place]

## 2022-05-09 NOTE — TELEPHONE ENCOUNTER
Verbal orders given to Lauri from [facility], per Dr. Sarmiento, for Order(s): Home Care Orders: Physical Therapy (PT): move PT and OT evals to this week due to pt refusing services last week. Saray Osorio LPN 5/9/2022 12:48 PM

## 2022-05-10 ENCOUNTER — TELEPHONE (OUTPATIENT)
Dept: INTERNAL MEDICINE | Facility: CLINIC | Age: 87
End: 2022-05-10
Payer: COMMERCIAL

## 2022-05-10 NOTE — TELEPHONE ENCOUNTER
[facility] Call Center    Phone Message    May a detailed message be left on voicemail: yes     Reason for Call: Order(s): Home Care Orders: Physical Therapy (PT): PT: 1x a week this week, 3x a week for 3 weeks,1x a week for 1 week working on gate training, fall prevention, Transfer training , therapeutic exercise and home exercise program. Please call with verbal orders than you.    Action Taken: Message routed to:  [facility]    Travel Screening: Not Applicable

## 2022-05-11 ENCOUNTER — ANTICOAGULATION THERAPY VISIT (OUTPATIENT)
Dept: ANTICOAGULATION | Facility: CLINIC | Age: 87
End: 2022-05-11
Payer: COMMERCIAL

## 2022-05-11 LAB — INR (EXTERNAL): 2.3 (ref 0.9–1.1)

## 2022-05-11 NOTE — PROGRESS NOTES
ANTICOAGULATION MANAGEMENT     Noe Florence 86 year old male is on warfarin with therapeutic INR result. (Goal INR 1.5-2.5)    Recent labs: (last 7 days)     05/11/22  0000   INR 2.3*       ASSESSMENT       Source(s): Chart Review and Patient/Caregiver Call       Warfarin doses taken: Warfarin taken as instructed    Diet: No new diet changes identified    New illness, injury, or hospitalization: No    Medication/supplement changes: None noted    Signs or symptoms of bleeding or clotting: No    Previous INR: Supratherapeutic    Additional findings: None       PLAN     Recommended plan for no diet, medication or health factor changes affecting INR     Dosing Instructions: continue your current warfarin dose with next INR in 2 weeks       Summary  As of 5/11/2022    Full warfarin instructions:  5 mg every day   Next INR check:  5/25/2022             Telephone call with [place] home care/facility nurse who verbalizes understanding and agrees to plan and who agrees to plan and repeated back plan correctly    Orders given to  Homecare nurse/facility to recheck    Education provided: None required    Plan made per [facility] anticoagulation protocol    Roya Manning RN  Anticoagulation Clinic  5/11/2022    _______________________________________________________________________     Anticoagulation Episode Summary     Current INR goal:  1.5-2.5   TTR:  70.8 % (1 y)   Target end date:  Indefinite   Send INR reminders to:  [facility]    Indications    Atrial fibrillation (H) [I48.91] [I48.91]  Long-term (current) use of anticoagulants [Z79.01] [Z79.01]  Deep vein thrombosis (DVT) (H) [I82.409]  Atrial fibrillation  unspecified type (H) [I48.91]           Comments:  Hx of Falls/Bleed  5/9/22 [facility] visit  [phone]         Anticoagulation Care Providers     Provider Role Specialty Phone number    Frida Desai MD Referring Cardiovascular Disease [phone]

## 2022-05-11 NOTE — TELEPHONE ENCOUNTER
Verbal orders given to Lauri from [facility], per Dr. Sarmiento, for Home Care Orders: Physical Therapy (PT): PT: 1x a week this week, 3x a week for 3 weeks,1x a week for 1 week working on gate training, fall prevention, Transfer training , therapeutic exercise and home exercise program. Saray Osorio LPN 5/11/2022 8:21 AM

## 2022-05-15 DIAGNOSIS — I25.10 CORONARY ARTERY DISEASE INVOLVING NATIVE HEART WITHOUT ANGINA PECTORIS, UNSPECIFIED VESSEL OR LESION TYPE: ICD-10-CM

## 2022-05-16 RX ORDER — METOPROLOL TARTRATE 25 MG/1
25 TABLET, FILM COATED ORAL 2 TIMES DAILY
Qty: 180 TABLET | Refills: 3 | Status: SHIPPED | OUTPATIENT
Start: 2022-05-16 | End: 2023-01-01

## 2022-05-20 ENCOUNTER — TELEPHONE (OUTPATIENT)
Dept: INTERNAL MEDICINE | Facility: CLINIC | Age: 87
End: 2022-05-20
Payer: COMMERCIAL

## 2022-05-20 NOTE — TELEPHONE ENCOUNTER
Called Lauri and left a secure VM. Gave verbal orders per Dr. Sarmiento for Order(s): Home Care Orders: Physical Therapy (PT): Reduce PT to 2 times a week for the next 2 weeks instead of 3 due to pt request      Niranjan Galaviz CMA ([facility]) at 3:41 PM on 5/20/2022

## 2022-05-20 NOTE — TELEPHONE ENCOUNTER
[facility] Call [facility]    Phone Message    May a detailed message be left on voicemail: yes     Reason for Call: Order(s): Home Care Orders: Physical Therapy (PT): Reduce PT to 2 times a week for the next 2 weeks instead of 3 due to pt request    Action Taken: Message routed to:  [facility] [place]

## 2022-05-25 ENCOUNTER — ANTICOAGULATION THERAPY VISIT (OUTPATIENT)
Dept: ANTICOAGULATION | Facility: CLINIC | Age: 87
End: 2022-05-25
Payer: COMMERCIAL

## 2022-05-25 DIAGNOSIS — I48.91 ATRIAL FIBRILLATION (H): Primary | ICD-10-CM

## 2022-05-25 DIAGNOSIS — R35.0 URINARY FREQUENCY: ICD-10-CM

## 2022-05-25 DIAGNOSIS — I82.409 DEEP VEIN THROMBOSIS (DVT) (H): ICD-10-CM

## 2022-05-25 DIAGNOSIS — Z79.01 LONG TERM CURRENT USE OF ANTICOAGULANT THERAPY: ICD-10-CM

## 2022-05-25 DIAGNOSIS — I48.91 ATRIAL FIBRILLATION, UNSPECIFIED TYPE (H): ICD-10-CM

## 2022-05-25 LAB — INR (EXTERNAL): 2.7 (ref 0.9–1.1)

## 2022-05-25 NOTE — PROGRESS NOTES
ANTICOAGULATION MANAGEMENT     Noe Florence 87 year old male is on warfarin with supratherapeutic INR result. (Goal INR 1.5-2.5)    Recent labs: (last 7 days)     05/25/22  0000   INR 2.7*       ASSESSMENT       Source(s): Chart Review, Patient/Caregiver Call and Home Care/Facility Nurse       Warfarin doses taken: Warfarin taken as instructed    Diet: Decreased greens/vitamin K in diet; plans to resume previous intake    New illness, injury, or hospitalization: No    Medication/supplement changes: None noted    Signs or symptoms of bleeding or clotting: No    Previous INR: Therapeutic last 2(+) visits    Additional findings: None       PLAN     Recommended plan for no diet, medication or health factor changes affecting INR     Dosing Instructions: continue your current warfarin dose with next INR in 2 weeks       Summary  As of 5/25/2022    Full warfarin instructions:  5 mg every day   Next INR check:  6/8/2022             Telephone call with [facility]  home care/facility nurse who verbalizes understanding and agrees to plan and who agrees to plan and repeated back plan correctly    Orders given to  Homecare nurse/facility to recheck    Education provided: Importance of consistent vitamin K intake, Impact of vitamin K foods on INR, Vitamin K content of foods, Goal range and significance of current result and Importance of therapeutic range    Plan made per [facility] anticoagulation protocol    Roya Manning RN  Anticoagulation Clinic  5/25/2022    _______________________________________________________________________     Anticoagulation Episode Summary     Current INR goal:  1.5-2.5   TTR:  68.9 % (1 y)   Target end date:  Indefinite   Send INR reminders to:  [facility]    Indications    Atrial fibrillation (H) [I48.91] [I48.91]  Long-term (current) use of anticoagulants [Z79.01] [Z79.01]  Deep vein thrombosis (DVT) (H) [I82.409]  Atrial fibrillation  unspecified type (H) [I48.91]           Comments:  Hx of  Falls/Bleed  5/9/22 [facility] visit Ph [phone]         Anticoagulation Care Providers     Provider Role Specialty Phone number    Frida Desai MD Referring Cardiovascular Disease [phone]

## 2022-05-27 ENCOUNTER — LAB (OUTPATIENT)
Dept: LAB | Facility: CLINIC | Age: 87
End: 2022-05-27
Payer: COMMERCIAL

## 2022-05-27 ENCOUNTER — ANTICOAGULATION THERAPY VISIT (OUTPATIENT)
Dept: ANTICOAGULATION | Facility: CLINIC | Age: 87
End: 2022-05-27

## 2022-05-27 DIAGNOSIS — I48.91 ATRIAL FIBRILLATION, UNSPECIFIED TYPE (H): ICD-10-CM

## 2022-05-27 DIAGNOSIS — Z79.01 LONG TERM CURRENT USE OF ANTICOAGULANT THERAPY: ICD-10-CM

## 2022-05-27 DIAGNOSIS — D64.9 ANEMIA, UNSPECIFIED TYPE: ICD-10-CM

## 2022-05-27 DIAGNOSIS — I48.91 ATRIAL FIBRILLATION (H): ICD-10-CM

## 2022-05-27 DIAGNOSIS — I48.91 ATRIAL FIBRILLATION (H): Primary | ICD-10-CM

## 2022-05-27 DIAGNOSIS — I82.409 DEEP VEIN THROMBOSIS (DVT) (H): ICD-10-CM

## 2022-05-27 LAB
FOLATE SERPL-MCNC: 37.4 NG/ML
INR PPP: 2.48 (ref 0.85–1.15)
VIT B12 SERPL-MCNC: 1976 PG/ML (ref 193–986)

## 2022-05-27 PROCEDURE — 82607 VITAMIN B-12: CPT | Performed by: PATHOLOGY

## 2022-05-27 PROCEDURE — 85610 PROTHROMBIN TIME: CPT | Performed by: PATHOLOGY

## 2022-05-27 PROCEDURE — 36415 COLL VENOUS BLD VENIPUNCTURE: CPT | Performed by: PATHOLOGY

## 2022-05-27 PROCEDURE — 99000 SPECIMEN HANDLING OFFICE-LAB: CPT | Performed by: PATHOLOGY

## 2022-05-27 PROCEDURE — 83921 ORGANIC ACID SINGLE QUANT: CPT | Mod: 90 | Performed by: PATHOLOGY

## 2022-05-27 PROCEDURE — 82746 ASSAY OF FOLIC ACID SERUM: CPT | Mod: 90 | Performed by: PATHOLOGY

## 2022-05-27 RX ORDER — TAMSULOSIN HYDROCHLORIDE 0.4 MG/1
0.8 CAPSULE ORAL DAILY
Qty: 180 CAPSULE | Refills: 2 | Status: SHIPPED | OUTPATIENT
Start: 2022-05-27 | End: 2023-01-01

## 2022-05-27 NOTE — PROGRESS NOTES
ANTICOAGULATION MANAGEMENT     Noe Florence 87 year old male is on warfarin with therapeutic INR result. (Goal INR 1.5-2.5)    Recent labs: (last 7 days)     05/27/22  1621   INR 2.48*       ASSESSMENT       Source(s): Chart Review    Previous INR was Supratherapeutic    Medication, diet, health changes since last INR chart reviewed; none identified           PLAN     Recommended plan for no diet, medication or health factor changes affecting INR     Dosing Instructions: continue your current warfarin dose with next INR in 2 weeks       Summary  As of 5/27/2022    Full warfarin instructions:  5 mg every day   Next INR check:  6/8/2022             Chart review only.    Orders given to previously on 5/25/22.    Education provided: None required    Plan made per [facility] anticoagulation protocol    Malika Chung RN  Anticoagulation Clinic  5/27/2022    _______________________________________________________________________     Anticoagulation Episode Summary     Current INR goal:  1.5-2.5   TTR:  68.3 % (1 y)   Target end date:  Indefinite   Send INR reminders to:  [facility]    Indications    Atrial fibrillation (H) [I48.91] [I48.91]  Long-term (current) use of anticoagulants [Z79.01] [Z79.01]  Deep vein thrombosis (DVT) (H) [I82.409]  Atrial fibrillation  unspecified type (H) [I48.91]           Comments:  Hx of Falls/Bleed  5/9/22 [facility] visit  [phone]         Anticoagulation Care Providers     Provider Role Specialty Phone number    Frida Desai MD Referring Cardiovascular Disease [phone]

## 2022-06-02 LAB — METHYLMALONATE SERPL-SCNC: 0.46 UMOL/L (ref 0–0.4)

## 2022-06-03 DIAGNOSIS — R79.89 HIGH SERUM METHYLMALONATE: Primary | ICD-10-CM

## 2022-06-03 RX ORDER — LANOLIN ALCOHOL/MO/W.PET/CERES
CREAM (GRAM) TOPICAL
Qty: 200 TABLET | Refills: 3 | Status: SHIPPED | OUTPATIENT
Start: 2022-06-03 | End: 2022-06-08

## 2022-06-06 ENCOUNTER — TELEPHONE (OUTPATIENT)
Dept: INTERNAL MEDICINE | Facility: CLINIC | Age: 87
End: 2022-06-06

## 2022-06-06 ENCOUNTER — OFFICE VISIT (OUTPATIENT)
Dept: INTERNAL MEDICINE | Facility: CLINIC | Age: 87
End: 2022-06-06
Payer: COMMERCIAL

## 2022-06-06 VITALS
RESPIRATION RATE: 12 BRPM | TEMPERATURE: 97.8 F | WEIGHT: 155 LBS | OXYGEN SATURATION: 98 % | HEART RATE: 64 BPM | SYSTOLIC BLOOD PRESSURE: 117 MMHG | DIASTOLIC BLOOD PRESSURE: 57 MMHG | HEIGHT: 67 IN | BODY MASS INDEX: 24.33 KG/M2

## 2022-06-06 DIAGNOSIS — R53.81 PHYSICAL DECONDITIONING: ICD-10-CM

## 2022-06-06 DIAGNOSIS — R29.6 FALLS FREQUENTLY: ICD-10-CM

## 2022-06-06 DIAGNOSIS — G62.9 NEUROPATHY: ICD-10-CM

## 2022-06-06 DIAGNOSIS — M81.8 OTHER OSTEOPOROSIS WITHOUT CURRENT PATHOLOGICAL FRACTURE: ICD-10-CM

## 2022-06-06 DIAGNOSIS — D64.9 ANEMIA, UNSPECIFIED TYPE: Primary | ICD-10-CM

## 2022-06-06 DIAGNOSIS — L97.511 SKIN ULCER OF TOE OF RIGHT FOOT, LIMITED TO BREAKDOWN OF SKIN (H): ICD-10-CM

## 2022-06-06 DIAGNOSIS — R79.89 HIGH SERUM METHYLMALONATE: ICD-10-CM

## 2022-06-06 DIAGNOSIS — M81.0 AGE RELATED OSTEOPOROSIS, UNSPECIFIED PATHOLOGICAL FRACTURE PRESENCE: ICD-10-CM

## 2022-06-06 PROCEDURE — 99215 OFFICE O/P EST HI 40 MIN: CPT | Performed by: INTERNAL MEDICINE

## 2022-06-06 ASSESSMENT — PAIN SCALES - GENERAL: PAINLEVEL: NO PAIN (0)

## 2022-06-06 NOTE — PROGRESS NOTES
HPI  87-year-old presents today with his wife for list of several issues.  He has not had any further falls since his last visit.  He is working with physical therapy in the home but has not been aggressive about doing his exercises between visits.  A PCA was recently incorporated to assist with this and encouraged his wife to help and for him to do isometric quad exercises in the interval.  He has developed open sore on the right second toe as well as over the bunionette on the right foot.  This is had some mild drainage no purulence no fever chills or sweats.  Its been dressed by home nursing and is scheduled for podiatry visit.  Given his foot he is likely going to need special shoes we made a referral for this.  He is back on the alendronate for the osteoporosis and is tolerating this well.  He is off the atorvastatin now and this may be assisting with his response to physical therapy.  We discussed his recent laboratory studies including the elevated MMA level and well put on high-dose oral vitamin B12 supplementation.  Does have a history of severe depression and is on 3 drugs for this through psychiatry.  We discussed discussing with psychiatry possible dose reductions given the fact that its been several years since he has had any flare.  Past Medical History:   Diagnosis Date     Advanced open-angle glaucoma      Atrial fibrillation (H)      CKD (chronic kidney disease), stage III (H) 2005     Colon cancer (H)     Stage II-B colon cancer     Coronary artery disease     s/p CABG x 2, JEREMY x 2     Diverticulitis      Hyperlipidemia      Hypertension      Ischemic cardiomyopathy      MGUS (monoclonal gammopathy of unknown significance)      Nonsenile cataract     BE     Osteoporosis     left hip fracture     Polymorphic ventricular tachycardia (H)      Polymyalgia rheumatica (H)      PVD (posterior vitreous detachment), both eyes 2005     S/P ablation of atrial flutter 6/20/14    CTI     Stented coronary  artery      SVT (supraventricular tachycardia) (H)     PPM/AICD for NSVT     Upper leg DVT (deep venous thromboembolism), chronic (H)     Left     Weight loss, unintentional 2017    15 lb in 4 months     Past Surgical History:   Procedure Laterality Date     CATARACT IOL, RT/LT Bilateral      COLONOSCOPY  3/13/2014    Procedure: COMBINED COLONOSCOPY, SINGLE BIOPSY/POLYPECTOMY BY BIOPSY;;  Surgeon: Mary Gerber MD;  Location:  GI     COLONOSCOPY N/A 6/22/2015    Procedure: COLONOSCOPY;  Surgeon: Marilin Newman MD;  Location: [facility]     COLONOSCOPY N/A 11/7/2018    Procedure: COMBINED COLONOSCOPY, SINGLE OR MULTIPLE BIOPSY/POLYPECTOMY BY BIOPSY;  Surgeon: Roberto Esteban MD;  Location:  GI     EP ICD GENERATOR REPLACEMENT DUAL N/A 12/11/2018    Procedure: EP ICD Generator Change Dual;  Surgeon: Deedee Baird MD;  Location:  HEART CARDIAC CATH LAB     H ABLATION ATRIAL FLUTTER       HEART CATH DRUG ELUTING STENT PLACEMENT  4/19/2012    JEREMY x 2 to LCx     IMPLANT IMPLANTABLE CARDIOVERTER DEFIBRILLATOR  5-    ppm/aicd     KNEE SURGERY      right and left knee surgeries     LAPAROSCOPIC ASSISTED COLECTOMY Right 2/1/2019    Procedure: Laparoscopic Converted to Open Transverse Colectomy with Lysis of Adhesions;  Surgeon: Chanelle Guzmán MD;  Location:  OR     LAPAROSCOPIC ASSISTED COLECTOMY LEFT (DESCENDING)  4/8/2014    Procedure: LAPAROSCOPIC ASSISTED COLECTOMY LEFT (DESCENDING);  Hand assisted Laparoscopic Sigmoid Colectomy , Laparoscopic mobilization of spleenic flexure *Latex Allergy*Anesthesia General with Block;  Surgeon: Chanelle Guzmán MD;  Location: [facility]     OPEN REDUCTION INTERNAL FIXATION RODDING INTRAMEDULLAR FEMUR FRACTURE TABLE Left 3/14/2019    Procedure: Open Reduction Internal Fixation Left Femur Intramedullar Nailing;  Surgeon: Srikanth Avalos MD;  Location:  OR     REPAIR VALVE MITRAL  2006     30-mm Medtronic Julian ring       "SELECTIVE LASER TRABECULOPLASTY (SLT) OD (RIGHT EYE)  4/10, 1/12,+1/9/13    RE     SELECTIVE LASER TRABECULOPLASTY (SLT) OS (LEFT EYE)  5/2012     SHOULDER SURGERY      right rotator cuff     ZZC CABG, ARTERY-VEIN, FOUR  2006    LIMA-LAD, SVG-Rt PDA, SVG-OM2, SVG-Diag 1     ZZC CABG, VEIN, SINGLE  1994    1-vessel CABG, SVG->PDRCA      Family History   Problem Relation Age of Onset     C.A.D. Father      Anesthesia Reaction No family hx of      Crohn's Disease No family hx of      Ulcerative Colitis No family hx of      Cancer - colorectal No family hx of      Macular Degeneration No family hx of      Cancer No family hx of         No known family hx of skin cancer     Melanoma No family hx of      Skin Cancer No family hx of          Exam:  /57   Pulse 64   Temp 97.8  F (36.6  C) (Oral)   Resp 12   Ht 1.702 m (5' 7\")   Wt 70.3 kg (155 lb)   SpO2 98%   BMI 24.28 kg/m    155 lbs 0 oz  The patient is alert, oriented with a clear sensorium.   Skin shows no lesions or rashes and good turgor.   Head is normocephalic and atraumatic.     Lungs are clear.   Heart shows normal S1 and S2 without murmur or gallop.    Extremities show no edema. Right foot has small 1 cm open ulceration over the PIP joint of a hammertoe of the second toe also has a bunion at with a superficial eschar on this.        ASSESSMENT  1 frequent falls with muscle weakness, neuropathy, foot drop and deconditioning  2 LUTS  on tamsulosin  3 history colon cancer S/P laparoscopic resection 2019  4 hypertension well controlled  5 hyperlipidemia on atorvastatin  6 renal insufficiency slight improvement  7 anemia with elevated MMA  8 atrial fibrillation on warfarin  9 peripheral arterial disease  10 monoclonal gammopathy stable  11 Hip fracture with nailing 3/14/2019  12 CAD S/P CABGx2 with JEREMY  13 V Tach S/P ICD  14 osteoporosis on alendronate  15 venous insufficiency with history  DVT  16 History depression on 3 drugs per psychiatry  17 right " foot deformity with superficial ulcerations    Plan  Needs to see podiatry and he needs orthotics for shoes.  We will continue off the atorvastatin.  Regards to his diet we will have him adjust the fiber to his bowels rather than to his INR.  Follow-up in 6 months and repeat the MMA along with erythropoietin level in 3 months on the increased B12 replacement dose.  Over 40 minutes on the day of service spent in chart review, patient contact, record completion and review and notification of lab reports    This note was completed using Dragon voice recognition software.      Roberto Sarmiento MD  General Internal Medicine  [facility]  [phone]

## 2022-06-06 NOTE — NURSING NOTE
Chief Complaint   Patient presents with     Anemia     Patient comes in to discuss anemia as well as deconditioning.          Adi Hernandez MA on 6/6/2022 at 2:45 PM

## 2022-06-06 NOTE — TELEPHONE ENCOUNTER
Verbal orders given to Lauri from [facility], per Dr. Sarmiento, for Home Care Orders: Physical Therapy (PT): 1 time a week for 1 week and 2 times a week for 3 weeks. Saray Osorio LPN 6/6/2022 3:03 PM

## 2022-06-06 NOTE — TELEPHONE ENCOUNTER
[facility] Call [facility]    Phone Message    May a detailed message be left on voicemail: yes     Reason for Call: Order(s): Home Care Orders: Physical Therapy (PT): 1 time a week for 1 week and 2 times a week for 3 weeks    Action Taken: Message routed to:  [facility]

## 2022-06-07 ENCOUNTER — TELEPHONE (OUTPATIENT)
Dept: INTERNAL MEDICINE | Facility: CLINIC | Age: 87
End: 2022-06-07
Payer: COMMERCIAL

## 2022-06-07 ENCOUNTER — MEDICAL CORRESPONDENCE (OUTPATIENT)
Dept: HEALTH INFORMATION MANAGEMENT | Facility: CLINIC | Age: 87
End: 2022-06-07
Payer: COMMERCIAL

## 2022-06-07 DIAGNOSIS — R79.89 HIGH SERUM METHYLMALONATE: ICD-10-CM

## 2022-06-07 NOTE — TELEPHONE ENCOUNTER
[facility] Call [facility]    Phone Message    May a detailed message be left on voicemail: yes     Reason for Call: Other: The patient and his wife would like to know if Dr Sarmiento would like to increase the medication or what does he want to do?  The same dosage is being prescribed and that is why they are calling about this.     Action Taken: Message routed to:  [facility]    Travel Screening: Not Applicable                                                       He needs to take 2000 mcg B-12 daily  JL

## 2022-06-08 ENCOUNTER — ANTICOAGULATION THERAPY VISIT (OUTPATIENT)
Dept: ANTICOAGULATION | Facility: CLINIC | Age: 87
End: 2022-06-08
Payer: COMMERCIAL

## 2022-06-08 DIAGNOSIS — I48.91 ATRIAL FIBRILLATION (H): Primary | ICD-10-CM

## 2022-06-08 DIAGNOSIS — I82.409 DEEP VEIN THROMBOSIS (DVT) (H): ICD-10-CM

## 2022-06-08 DIAGNOSIS — Z79.01 LONG TERM CURRENT USE OF ANTICOAGULANT THERAPY: ICD-10-CM

## 2022-06-08 DIAGNOSIS — I48.91 ATRIAL FIBRILLATION, UNSPECIFIED TYPE (H): ICD-10-CM

## 2022-06-08 LAB — INR (EXTERNAL): 2.6 (ref 0.9–1.1)

## 2022-06-08 RX ORDER — LANOLIN ALCOHOL/MO/W.PET/CERES
CREAM (GRAM) TOPICAL
Qty: 400 TABLET | Refills: 3 | Status: ON HOLD | OUTPATIENT
Start: 2022-06-08 | End: 2024-01-01

## 2022-06-08 NOTE — PROGRESS NOTES
ANTICOAGULATION MANAGEMENT     Noe Florence 87 year old male is on warfarin with supratherapeutic INR result. (Goal INR 1.5-2.5)    Recent labs: (last 7 days)     06/08/22  0000   INR 2.6*       ASSESSMENT       Source(s): Chart Review and Home Care/Facility Nurse       Warfarin doses taken: Warfarin taken as instructed    Diet: No new diet changes identified    New illness, injury, or hospitalization: No    Medication/supplement changes: None noted    Signs or symptoms of bleeding or clotting: No    Previous INR: Supratherapeutic    Additional findings: None       PLAN     Recommended plan for no diet, medication or health factor changes affecting INR     Dosing Instructions: continue your current warfarin dose with next INR in 2 weeks       Summary  As of 6/8/2022    Full warfarin instructions:  5 mg every day   Next INR check:               Detailed voice message left for [facility] home care/facility nurse with dosing instructions and follow up date.     Orders given to  Homecare nurse/facility to recheck    Education provided: Goal range and significance of current result and Importance of therapeutic range    Plan made per [facility] anticoagulation protocol    Roya Manning RN  Anticoagulation Clinic  6/8/2022    _______________________________________________________________________     Anticoagulation Episode Summary     Current INR goal:  1.5-2.5   TTR:  65.6 % (1 y)   Target end date:  Indefinite   Send INR reminders to:  [facility]    Indications    Atrial fibrillation (H) [I48.91] [I48.91]  Long-term (current) use of anticoagulants [Z79.01] [Z79.01]  Deep vein thrombosis (DVT) (H) [I82.409]  Atrial fibrillation  unspecified type (H) [I48.91]           Comments:  Hx of Falls/Bleed  5/9/22 [facility] visit  [phone]         Anticoagulation Care Providers     Provider Role Specialty Phone number    Frida Desai MD Referring Cardiovascular Disease [phone]

## 2022-06-08 NOTE — TELEPHONE ENCOUNTER
Spoke to Rica to relay Dr. Sarmiento's recommendations. Rica states that the patient is currently and has been currently taking 1000mcg BID.    Rica is wondering if the B-12 should be increased more since he was already taking 2000 mcg daily. Saray Osorio, STAR 6/8/2022 1:45 PM    THen he should double the dose to 4000 mcg daily  JL

## 2022-06-08 NOTE — TELEPHONE ENCOUNTER
Spoke to patients wife to relay providers message below to up go up to 4000 mg daily. Wife states verbal understanding. Saray Osorio LPN 6/8/2022 3:07 PM

## 2022-06-15 ENCOUNTER — MEDICAL CORRESPONDENCE (OUTPATIENT)
Dept: HEALTH INFORMATION MANAGEMENT | Facility: CLINIC | Age: 87
End: 2022-06-15
Payer: COMMERCIAL

## 2022-06-18 ENCOUNTER — MEDICAL CORRESPONDENCE (OUTPATIENT)
Dept: HEALTH INFORMATION MANAGEMENT | Facility: CLINIC | Age: 87
End: 2022-06-18
Payer: COMMERCIAL

## 2022-06-21 ENCOUNTER — ANTICOAGULATION THERAPY VISIT (OUTPATIENT)
Dept: ANTICOAGULATION | Facility: CLINIC | Age: 87
End: 2022-06-21
Payer: COMMERCIAL

## 2022-06-21 DIAGNOSIS — I82.409 DEEP VEIN THROMBOSIS (DVT) (H): ICD-10-CM

## 2022-06-21 DIAGNOSIS — Z79.01 LONG TERM CURRENT USE OF ANTICOAGULANT THERAPY: ICD-10-CM

## 2022-06-21 DIAGNOSIS — I48.91 ATRIAL FIBRILLATION, UNSPECIFIED TYPE (H): ICD-10-CM

## 2022-06-21 DIAGNOSIS — I48.91 ATRIAL FIBRILLATION (H): Primary | ICD-10-CM

## 2022-06-21 LAB — INR (EXTERNAL): 2.7 (ref 0.9–1.1)

## 2022-06-21 NOTE — PROGRESS NOTES
"ANTICOAGULATION MANAGEMENT     Noe Florence 87 year old male is on warfarin with supratherapeutic INR result. (Goal INR 1.5-2.5)    Recent labs: (last 7 days)     06/21/22  1351   INR 2.7*       ASSESSMENT       Source(s): Chart Review and Home Care/Facility Nurse       Warfarin doses taken: Warfarin taken as instructed    Diet: No new diet changes identified    New illness, injury, or hospitalization: No    Medication/supplement changes: None noted    Signs or symptoms of bleeding or clotting: No    Previous INR: Supratherapeutic    Additional findings: Rationale for leaving current dosing alone, patient has been set up for 5mg daily by HHN.  And POCT is likely \"in range\".  May need to adjust if INR continues to climb.       PLAN     Recommended plan for no diet, medication or health factor changes affecting INR     Dosing Instructions: continue your current warfarin dose with next INR in 2 weeks       Summary  As of 6/21/2022    Full warfarin instructions:  5 mg every day   Next INR check:  7/5/2022             Telephone call with [facility] home care/facility nurse who agrees to plan and repeated back plan correctly    Orders given to  Homecare nurse/facility to recheck    Education provided: Monitoring for bleeding signs and symptoms, Monitoring for clotting signs and symptoms and When to seek medical attention/emergency care    Plan made per [facility] anticoagulation protocol    Ciro Bajwa, RN  Anticoagulation Clinic  6/21/2022    _______________________________________________________________________     Anticoagulation Episode Summary     Current INR goal:  1.5-2.5   TTR:  62.0 % (1 y)   Target end date:  Indefinite   Send INR reminders to:  [facility]    Indications    Atrial fibrillation (H) [I48.91] [I48.91]  Long-term (current) use of anticoagulants [Z79.01] [Z79.01]  Deep vein thrombosis (DVT) (H) [I82.409]  Atrial fibrillation  unspecified type (H) [I48.91]           Comments:  Hx of " Falls/Bleed  5/9/22 [facility] visit Ph [phone]         Anticoagulation Care Providers     Provider Role Specialty Phone number    Frida Desai MD Referring Cardiovascular Disease [phone]

## 2022-06-25 ENCOUNTER — MEDICAL CORRESPONDENCE (OUTPATIENT)
Dept: HEALTH INFORMATION MANAGEMENT | Facility: CLINIC | Age: 87
End: 2022-06-25

## 2022-06-28 ENCOUNTER — MEDICAL CORRESPONDENCE (OUTPATIENT)
Dept: HEALTH INFORMATION MANAGEMENT | Facility: CLINIC | Age: 87
End: 2022-06-28

## 2022-06-29 ENCOUNTER — TELEPHONE (OUTPATIENT)
Dept: INTERNAL MEDICINE | Facility: CLINIC | Age: 87
End: 2022-06-29

## 2022-06-29 NOTE — TELEPHONE ENCOUNTER
[facility] Call Center    Phone Message    May a detailed message be left on voicemail: yes     Reason for Call: Order(s): Home Care Orders: Physical Therapy (PT): 1x a wk for 4wks to work on gait and stair training, fall prevention, therapeautic exercise and home exercise program.    Action Taken: Message routed to:  [facility]    Travel Screening: Not Applicable

## 2022-06-30 NOTE — TELEPHONE ENCOUNTER
Verbal orders given to Lauri from [facility], per Dr. Sarmiento, for Home Care Orders: Physical Therapy (PT): 1x a wk for 4wks to work on gait and stair training, fall prevention, therapeautic exercise and home exercise program. Saray Osorio LPN 6/30/2022 7:46 AM

## 2022-07-06 ENCOUNTER — MEDICAL CORRESPONDENCE (OUTPATIENT)
Dept: HEALTH INFORMATION MANAGEMENT | Facility: CLINIC | Age: 87
End: 2022-07-06

## 2022-07-07 ENCOUNTER — TELEPHONE (OUTPATIENT)
Dept: OPHTHALMOLOGY | Facility: CLINIC | Age: 87
End: 2022-07-07

## 2022-07-08 ENCOUNTER — LAB (OUTPATIENT)
Dept: LAB | Facility: CLINIC | Age: 87
End: 2022-07-08
Payer: COMMERCIAL

## 2022-07-08 ENCOUNTER — ANTICOAGULATION THERAPY VISIT (OUTPATIENT)
Dept: ANTICOAGULATION | Facility: CLINIC | Age: 87
End: 2022-07-08

## 2022-07-08 DIAGNOSIS — I48.91 ATRIAL FIBRILLATION, UNSPECIFIED TYPE (H): ICD-10-CM

## 2022-07-08 DIAGNOSIS — I48.91 ATRIAL FIBRILLATION (H): Primary | ICD-10-CM

## 2022-07-08 DIAGNOSIS — I48.91 ATRIAL FIBRILLATION (H): ICD-10-CM

## 2022-07-08 DIAGNOSIS — I82.409 DEEP VEIN THROMBOSIS (DVT) (H): ICD-10-CM

## 2022-07-08 DIAGNOSIS — Z79.01 LONG TERM CURRENT USE OF ANTICOAGULANT THERAPY: ICD-10-CM

## 2022-07-08 DIAGNOSIS — D64.9 ANEMIA, UNSPECIFIED TYPE: ICD-10-CM

## 2022-07-08 LAB — INR PPP: 2.33 (ref 0.85–1.15)

## 2022-07-08 PROCEDURE — 99000 SPECIMEN HANDLING OFFICE-LAB: CPT | Performed by: PATHOLOGY

## 2022-07-08 PROCEDURE — 85610 PROTHROMBIN TIME: CPT | Performed by: PATHOLOGY

## 2022-07-08 PROCEDURE — 82668 ASSAY OF ERYTHROPOIETIN: CPT | Mod: 90 | Performed by: PATHOLOGY

## 2022-07-08 PROCEDURE — 36415 COLL VENOUS BLD VENIPUNCTURE: CPT | Performed by: PATHOLOGY

## 2022-07-08 NOTE — PROGRESS NOTES
ANTICOAGULATION MANAGEMENT     Noe Florence 87 year old male is on warfarin with therapeutic INR result. (Goal INR 1.5-2.5)    Recent labs: (last 7 days)     07/08/22  1445   INR 2.33*       ASSESSMENT       Source(s): Chart Review and Patient/Caregiver Call       Warfarin doses taken: Warfarin taken as instructed    Diet: No new diet changes identified    New illness, injury, or hospitalization: No    Medication/supplement changes: Patient is going to start a course of Augmentin.  Patient will also be taking a probiotic with this.    Signs or symptoms of bleeding or clotting: No    Previous INR: Supratherapeutic    Additional findings: None       PLAN     Recommended plan for no diet, medication or health factor changes affecting INR     Dosing Instructions: continue your current warfarin dose with next INR in 1 week       Summary  As of 7/8/2022    Full warfarin instructions:  5 mg every day   Next INR check:  7/15/2022             Telephone call with  Rica who verbalizes understanding and agrees to plan and who agrees to plan and repeated back plan correctly    Lab visit scheduled    Education provided: Goal range and significance of current result and Importance of therapeutic range    Plan made per [facility] anticoagulation protocol    Roya Manning RN  Anticoagulation Clinic  7/8/2022    _______________________________________________________________________     Anticoagulation Episode Summary     Current INR goal:  1.5-2.5   TTR:  59.5 % (1 y)   Target end date:  Indefinite   Send INR reminders to:  [facility]    Indications    Atrial fibrillation (H) [I48.91] [I48.91]  Long-term (current) use of anticoagulants [Z79.01] [Z79.01]  Deep vein thrombosis (DVT) (H) [I82.409]  Atrial fibrillation  unspecified type (H) [I48.91]           Comments:  Hx of Falls/Bleed  5/9/22 [facility] visit  [phone]         Anticoagulation Care Providers     Provider Role Specialty Phone number    Lloyd  MD Frida Referring Cardiovascular Disease [phone]

## 2022-07-09 LAB — EPO SERPL-ACNC: 17 MU/ML

## 2022-07-14 ENCOUNTER — ANTICOAGULATION THERAPY VISIT (OUTPATIENT)
Dept: ANTICOAGULATION | Facility: CLINIC | Age: 87
End: 2022-07-14

## 2022-07-14 ENCOUNTER — LAB (OUTPATIENT)
Dept: LAB | Facility: CLINIC | Age: 87
End: 2022-07-14
Payer: COMMERCIAL

## 2022-07-14 ENCOUNTER — DOCUMENTATION ONLY (OUTPATIENT)
Dept: INTERNAL MEDICINE | Facility: CLINIC | Age: 87
End: 2022-07-14

## 2022-07-14 DIAGNOSIS — I82.409 DEEP VEIN THROMBOSIS (DVT) (H): ICD-10-CM

## 2022-07-14 DIAGNOSIS — I48.91 ATRIAL FIBRILLATION, UNSPECIFIED TYPE (H): Primary | ICD-10-CM

## 2022-07-14 DIAGNOSIS — I48.91 ATRIAL FIBRILLATION, UNSPECIFIED TYPE (H): ICD-10-CM

## 2022-07-14 DIAGNOSIS — Z79.01 LONG TERM CURRENT USE OF ANTICOAGULANT THERAPY: ICD-10-CM

## 2022-07-14 DIAGNOSIS — I48.91 ATRIAL FIBRILLATION (H): Primary | ICD-10-CM

## 2022-07-14 LAB — INR BLD: 2.4 (ref 0.9–1.1)

## 2022-07-14 PROCEDURE — 36415 COLL VENOUS BLD VENIPUNCTURE: CPT | Performed by: PATHOLOGY

## 2022-07-14 PROCEDURE — 85610 PROTHROMBIN TIME: CPT | Performed by: PATHOLOGY

## 2022-07-14 NOTE — PROGRESS NOTES
ANTICOAGULATION MANAGEMENT     Noe Florence 87 year old male is on warfarin with therapeutic INR result. (Goal INR 1.5-2.5)    Recent labs: (last 7 days)     07/14/22  1445   INR 2.4*       ASSESSMENT       Source(s): Chart Review and Patient/Caregiver Call       Warfarin doses taken: Warfarin taken as instructed    Diet: No new diet changes identified    New illness, injury, or hospitalization: toe infection per Rica;  Rica reports Sha fell in shower last weekend (slipped back and slid to floor)  He did not hit his head, he scraped his arm.  He was not seen in clinic for this.    Medication/supplement changes: on augmentin for infected toe.  also taking probiotics    Signs or symptoms of bleeding or clotting: No--toe stopped bleeding    Previous INR: Therapeutic last visit; previously outside of goal range    Additional findings: None       PLAN     Recommended plan for no diet, medication or health factor changes affecting INR     Dosing Instructions: continue your current warfarin dose with next INR in 2 weeks       Summary  As of 7/14/2022    Full warfarin instructions:  5 mg every day   Next INR check:  7/28/2022             Telephone call with  Rica who agrees to plan and repeated back plan correctly    Contact [phone] to schedule and with any changes, questions or concerns.     Education provided: Please call back if any changes to your diet, medications or how you've been taking warfarin and Contact [phone] with any changes, questions or concerns.     Plan made per [facility] anticoagulation protocol    Danya Edwards RN  Anticoagulation Clinic  7/14/2022    _______________________________________________________________________     Anticoagulation Episode Summary     Current INR goal:  1.5-2.5   TTR:  59.5 % (1 y)   Target end date:  Indefinite   Send INR reminders to:  [facility]    Indications    Atrial fibrillation (H) [I48.91] [I48.91]  Long-term (current) use of  anticoagulants [Z79.01] [Z79.01]  Deep vein thrombosis (DVT) (H) [I82.409]  Atrial fibrillation  unspecified type (H) [I48.91]           Comments:  Hx of Falls/Bleed         Anticoagulation Care Providers     Provider Role Specialty Phone number    Frida Desai MD Referring Cardiovascular Disease [phone]

## 2022-07-14 NOTE — PROGRESS NOTES
ANTICOAGULATION CLINIC REFERRAL RENEWAL REQUEST       An annual renewal order is required for all patients referred to [facility].?  Please review and sign the pended referral order for Noe Florence.       ANTICOAGULATION SUMMARY      Warfarin indication(s)   Atrial Fibrillation and DVT    Mechanical heart valve present?  NO       Current goal range   1.5-2.5     Goal appropriate for indication? Goal INR 1.5-2.5 appropriate for indication above  Please review as off protocol   Time in Therapeutic Range (TTR)  (Goal > 60%) 59.5%       Office visit with referring provider's group within last year yes on 6/6/22       Rosalba Gale RN  [facility]

## 2022-07-29 ENCOUNTER — ANTICOAGULATION THERAPY VISIT (OUTPATIENT)
Dept: ANTICOAGULATION | Facility: CLINIC | Age: 87
End: 2022-07-29

## 2022-07-29 ENCOUNTER — LAB (OUTPATIENT)
Dept: LAB | Facility: CLINIC | Age: 87
End: 2022-07-29
Payer: COMMERCIAL

## 2022-07-29 DIAGNOSIS — I48.91 ATRIAL FIBRILLATION (H): Primary | ICD-10-CM

## 2022-07-29 DIAGNOSIS — I82.409 DEEP VEIN THROMBOSIS (DVT) (H): ICD-10-CM

## 2022-07-29 DIAGNOSIS — I48.91 ATRIAL FIBRILLATION, UNSPECIFIED TYPE (H): ICD-10-CM

## 2022-07-29 DIAGNOSIS — Z79.01 LONG TERM CURRENT USE OF ANTICOAGULANT THERAPY: ICD-10-CM

## 2022-07-29 LAB — INR BLD: 2.1 (ref 0.9–1.1)

## 2022-07-29 PROCEDURE — 85610 PROTHROMBIN TIME: CPT | Performed by: PATHOLOGY

## 2022-07-29 PROCEDURE — 36416 COLLJ CAPILLARY BLOOD SPEC: CPT | Performed by: PATHOLOGY

## 2022-07-29 NOTE — PROGRESS NOTES
ANTICOAGULATION MANAGEMENT     Noe Florence 87 year old male is on warfarin with therapeutic INR result. (Goal INR Other - see comment)    Recent labs: (last 7 days)     07/29/22  1624   INR 2.1*       ASSESSMENT       Source(s): Chart Review       Warfarin doses taken: Warfarin taken as instructed    Diet: No new diet changes identified    New illness, injury, or hospitalization: Yes: chronic loose stools    Medication/supplement changes: Augmentin 10 day course (dates: 7/20 to 7/30) No interaction anticipated    Signs or symptoms of bleeding or clotting: No    Previous INR: Therapeutic last 2(+) visits    Additional findings: None       PLAN     Recommended plan for no diet, medication or health factor changes affecting INR     Dosing Instructions: Continue your current warfarin dose with next INR in 2 weeks       Summary  As of 7/29/2022    Full warfarin instructions:  5 mg every day   Next INR check:  8/10/2022             Telephone call with  Rica who verbalizes understanding and agrees to plan    Lab visit scheduled    Education provided: Please call back if any changes to your diet, medications or how you've been taking warfarin    Plan made per [facility] anticoagulation protocol    Ciro Bajwa, RN  Anticoagulation Clinic  7/29/2022    _______________________________________________________________________     Anticoagulation Episode Summary     Current INR goal:  Other - see comment   TTR:  59.5 % (1 y)   Target end date:  Indefinite   Send INR reminders to:  [facility]    Indications    Atrial fibrillation (H) [I48.91] [I48.91]  Long-term (current) use of anticoagulants [Z79.01] [Z79.01]  Deep vein thrombosis (DVT) (H) [I82.409]  Atrial fibrillation  unspecified type (H) [I48.91]           Comments:  Hx of Falls/Bleed  Goal range: 1.5-2.5         Anticoagulation Care Providers     Provider Role Specialty Phone number    Frida Desai MD Referring Cardiovascular Disease [phone]     Roberto Sarmiento MD [facility] Internal Medicine [phone]

## 2022-07-31 DIAGNOSIS — I48.20 CHRONIC ATRIAL FIBRILLATION (H): ICD-10-CM

## 2022-08-01 ENCOUNTER — ANCILLARY PROCEDURE (OUTPATIENT)
Dept: CARDIOLOGY | Facility: CLINIC | Age: 87
End: 2022-08-01
Attending: INTERNAL MEDICINE
Payer: COMMERCIAL

## 2022-08-01 DIAGNOSIS — I48.0 PAROXYSMAL ATRIAL FIBRILLATION (H): ICD-10-CM

## 2022-08-01 DIAGNOSIS — Z95.810 ICD (IMPLANTABLE CARDIOVERTER-DEFIBRILLATOR) IN PLACE: ICD-10-CM

## 2022-08-01 DIAGNOSIS — I47.20 VENTRICULAR TACHYARRHYTHMIA (H): ICD-10-CM

## 2022-08-01 PROCEDURE — 93296 REM INTERROG EVL PM/IDS: CPT

## 2022-08-01 PROCEDURE — 93295 DEV INTERROG REMOTE 1/2/MLT: CPT | Performed by: INTERNAL MEDICINE

## 2022-08-01 RX ORDER — WARFARIN SODIUM 5 MG/1
TABLET ORAL
Qty: 90 TABLET | Refills: 1 | Status: SHIPPED | OUTPATIENT
Start: 2022-08-01 | End: 2023-01-01

## 2022-08-01 NOTE — TELEPHONE ENCOUNTER
ANTICOAGULATION MANAGEMENT:  Medication Refill    Anticoagulation Summary  As of 7/29/2022    Warfarin maintenance plan:  5 mg (5 mg x 1) every day   Next INR check:  8/10/2022   Target end date:  Indefinite    Indications    Atrial fibrillation (H) [I48.91] [I48.91]  Long-term (current) use of anticoagulants [Z79.01] [Z79.01]  Deep vein thrombosis (DVT) (H) [I82.409]  Atrial fibrillation  unspecified type (H) [I48.91]             Anticoagulation Care Providers     Provider Role Specialty Phone number    Frida Desai MD Referring Cardiovascular Disease [phone]    [facility] Referring Internal Medicine [phone]          Visit with referring provider/group within last year: Yes    [facility] referral signed within last year: Yes    Noe meets all criteria for refill (current [facility] referral, office visit with referring provider/group in last year, lab monitoring up to date or not exceeding 2 weeks overdue). Rx instructions and quantity supplied updated to match patient's current dosing plan. Warfarin 90 day supply with 1 refill granted per [facility] protocol     BEVERLEY LOONEY RN  Anticoagulation Clinic

## 2022-08-03 LAB
MDC_IDC_LEAD_IMPLANT_DT: NORMAL
MDC_IDC_LEAD_IMPLANT_DT: NORMAL
MDC_IDC_LEAD_LOCATION: NORMAL
MDC_IDC_LEAD_LOCATION: NORMAL
MDC_IDC_LEAD_LOCATION_DETAIL_1: NORMAL
MDC_IDC_LEAD_LOCATION_DETAIL_1: NORMAL
MDC_IDC_LEAD_MFG: NORMAL
MDC_IDC_LEAD_MFG: NORMAL
MDC_IDC_LEAD_MODEL: NORMAL
MDC_IDC_LEAD_MODEL: NORMAL
MDC_IDC_LEAD_POLARITY_TYPE: NORMAL
MDC_IDC_LEAD_POLARITY_TYPE: NORMAL
MDC_IDC_LEAD_SERIAL: NORMAL
MDC_IDC_LEAD_SERIAL: NORMAL
MDC_IDC_MSMT_BATTERY_DTM: NORMAL
MDC_IDC_MSMT_BATTERY_REMAINING_LONGEVITY: 61 MO
MDC_IDC_MSMT_BATTERY_RRT_TRIGGER: 2.73
MDC_IDC_MSMT_BATTERY_STATUS: NORMAL
MDC_IDC_MSMT_BATTERY_VOLTAGE: 2.97 V
MDC_IDC_MSMT_CAP_CHARGE_DTM: NORMAL
MDC_IDC_MSMT_CAP_CHARGE_ENERGY: 18 J
MDC_IDC_MSMT_CAP_CHARGE_TIME: 3.85
MDC_IDC_MSMT_CAP_CHARGE_TYPE: NORMAL
MDC_IDC_MSMT_LEADCHNL_RA_IMPEDANCE_VALUE: 456 OHM
MDC_IDC_MSMT_LEADCHNL_RA_PACING_THRESHOLD_AMPLITUDE: 0.62 V
MDC_IDC_MSMT_LEADCHNL_RA_PACING_THRESHOLD_PULSEWIDTH: 0.4 MS
MDC_IDC_MSMT_LEADCHNL_RA_SENSING_INTR_AMPL: 0.8 MV
MDC_IDC_MSMT_LEADCHNL_RV_IMPEDANCE_VALUE: 304 OHM
MDC_IDC_MSMT_LEADCHNL_RV_IMPEDANCE_VALUE: 342 OHM
MDC_IDC_MSMT_LEADCHNL_RV_PACING_THRESHOLD_AMPLITUDE: 0.88 V
MDC_IDC_MSMT_LEADCHNL_RV_PACING_THRESHOLD_PULSEWIDTH: 0.4 MS
MDC_IDC_MSMT_LEADCHNL_RV_SENSING_INTR_AMPL: 8.3 MV
MDC_IDC_PG_IMPLANT_DTM: NORMAL
MDC_IDC_PG_MFG: NORMAL
MDC_IDC_PG_MODEL: NORMAL
MDC_IDC_PG_SERIAL: NORMAL
MDC_IDC_PG_TYPE: NORMAL
MDC_IDC_SESS_CLINIC_NAME: NORMAL
MDC_IDC_SESS_DTM: NORMAL
MDC_IDC_SESS_TYPE: NORMAL
MDC_IDC_SET_BRADY_AT_MODE_SWITCH_RATE: 171 {BEATS}/MIN
MDC_IDC_SET_BRADY_HYSTRATE: NORMAL
MDC_IDC_SET_BRADY_LOWRATE: 60 {BEATS}/MIN
MDC_IDC_SET_BRADY_MAX_SENSOR_RATE: 130 {BEATS}/MIN
MDC_IDC_SET_BRADY_MAX_TRACKING_RATE: 130 {BEATS}/MIN
MDC_IDC_SET_BRADY_MODE: NORMAL
MDC_IDC_SET_BRADY_PAV_DELAY_LOW: 180 MS
MDC_IDC_SET_BRADY_SAV_DELAY_LOW: 150 MS
MDC_IDC_SET_LEADCHNL_RA_PACING_AMPLITUDE: 1.5 V
MDC_IDC_SET_LEADCHNL_RA_PACING_ANODE_ELECTRODE_1: NORMAL
MDC_IDC_SET_LEADCHNL_RA_PACING_ANODE_LOCATION_1: NORMAL
MDC_IDC_SET_LEADCHNL_RA_PACING_CAPTURE_MODE: NORMAL
MDC_IDC_SET_LEADCHNL_RA_PACING_CATHODE_ELECTRODE_1: NORMAL
MDC_IDC_SET_LEADCHNL_RA_PACING_CATHODE_LOCATION_1: NORMAL
MDC_IDC_SET_LEADCHNL_RA_PACING_POLARITY: NORMAL
MDC_IDC_SET_LEADCHNL_RA_PACING_PULSEWIDTH: 0.4 MS
MDC_IDC_SET_LEADCHNL_RA_SENSING_ANODE_ELECTRODE_1: NORMAL
MDC_IDC_SET_LEADCHNL_RA_SENSING_ANODE_LOCATION_1: NORMAL
MDC_IDC_SET_LEADCHNL_RA_SENSING_CATHODE_ELECTRODE_1: NORMAL
MDC_IDC_SET_LEADCHNL_RA_SENSING_CATHODE_LOCATION_1: NORMAL
MDC_IDC_SET_LEADCHNL_RA_SENSING_POLARITY: NORMAL
MDC_IDC_SET_LEADCHNL_RA_SENSING_SENSITIVITY: 0.3 MV
MDC_IDC_SET_LEADCHNL_RV_PACING_AMPLITUDE: 2 V
MDC_IDC_SET_LEADCHNL_RV_PACING_ANODE_ELECTRODE_1: NORMAL
MDC_IDC_SET_LEADCHNL_RV_PACING_ANODE_LOCATION_1: NORMAL
MDC_IDC_SET_LEADCHNL_RV_PACING_CAPTURE_MODE: NORMAL
MDC_IDC_SET_LEADCHNL_RV_PACING_CATHODE_ELECTRODE_1: NORMAL
MDC_IDC_SET_LEADCHNL_RV_PACING_CATHODE_LOCATION_1: NORMAL
MDC_IDC_SET_LEADCHNL_RV_PACING_POLARITY: NORMAL
MDC_IDC_SET_LEADCHNL_RV_PACING_PULSEWIDTH: 0.4 MS
MDC_IDC_SET_LEADCHNL_RV_SENSING_ANODE_ELECTRODE_1: NORMAL
MDC_IDC_SET_LEADCHNL_RV_SENSING_ANODE_LOCATION_1: NORMAL
MDC_IDC_SET_LEADCHNL_RV_SENSING_CATHODE_ELECTRODE_1: NORMAL
MDC_IDC_SET_LEADCHNL_RV_SENSING_CATHODE_LOCATION_1: NORMAL
MDC_IDC_SET_LEADCHNL_RV_SENSING_POLARITY: NORMAL
MDC_IDC_SET_LEADCHNL_RV_SENSING_SENSITIVITY: 0.45 MV
MDC_IDC_SET_ZONE_DETECTION_BEATS_DENOMINATOR: 24 {BEATS}
MDC_IDC_SET_ZONE_DETECTION_BEATS_NUMERATOR: 18 {BEATS}
MDC_IDC_SET_ZONE_DETECTION_INTERVAL: 300 MS
MDC_IDC_SET_ZONE_DETECTION_INTERVAL: 320 MS
MDC_IDC_SET_ZONE_DETECTION_INTERVAL: 350 MS
MDC_IDC_SET_ZONE_DETECTION_INTERVAL: 430 MS
MDC_IDC_SET_ZONE_DETECTION_INTERVAL: NORMAL
MDC_IDC_SET_ZONE_TYPE: NORMAL
MDC_IDC_STAT_AT_BURDEN_PERCENT: 0 %
MDC_IDC_STAT_AT_DTM_END: NORMAL
MDC_IDC_STAT_AT_DTM_START: NORMAL
MDC_IDC_STAT_BRADY_AP_VP_PERCENT: 37.8 %
MDC_IDC_STAT_BRADY_AP_VS_PERCENT: 34.44 %
MDC_IDC_STAT_BRADY_AS_VP_PERCENT: 11.29 %
MDC_IDC_STAT_BRADY_AS_VS_PERCENT: 16.46 %
MDC_IDC_STAT_BRADY_DTM_END: NORMAL
MDC_IDC_STAT_BRADY_DTM_START: NORMAL
MDC_IDC_STAT_BRADY_RA_PERCENT_PACED: 70.15 %
MDC_IDC_STAT_BRADY_RV_PERCENT_PACED: 49.11 %
MDC_IDC_STAT_EPISODE_RECENT_COUNT: 0
MDC_IDC_STAT_EPISODE_RECENT_COUNT_DTM_END: NORMAL
MDC_IDC_STAT_EPISODE_RECENT_COUNT_DTM_START: NORMAL
MDC_IDC_STAT_EPISODE_TOTAL_COUNT: 0
MDC_IDC_STAT_EPISODE_TOTAL_COUNT: 5
MDC_IDC_STAT_EPISODE_TOTAL_COUNT: 72
MDC_IDC_STAT_EPISODE_TOTAL_COUNT_DTM_END: NORMAL
MDC_IDC_STAT_EPISODE_TOTAL_COUNT_DTM_START: NORMAL
MDC_IDC_STAT_EPISODE_TYPE: NORMAL
MDC_IDC_STAT_TACHYTHERAPY_ATP_DELIVERED_RECENT: 0
MDC_IDC_STAT_TACHYTHERAPY_ATP_DELIVERED_TOTAL: 0
MDC_IDC_STAT_TACHYTHERAPY_RECENT_DTM_END: NORMAL
MDC_IDC_STAT_TACHYTHERAPY_RECENT_DTM_START: NORMAL
MDC_IDC_STAT_TACHYTHERAPY_SHOCKS_ABORTED_RECENT: 0
MDC_IDC_STAT_TACHYTHERAPY_SHOCKS_ABORTED_TOTAL: 0
MDC_IDC_STAT_TACHYTHERAPY_SHOCKS_DELIVERED_RECENT: 0
MDC_IDC_STAT_TACHYTHERAPY_SHOCKS_DELIVERED_TOTAL: 0
MDC_IDC_STAT_TACHYTHERAPY_TOTAL_DTM_END: NORMAL
MDC_IDC_STAT_TACHYTHERAPY_TOTAL_DTM_START: NORMAL

## 2022-08-08 ENCOUNTER — OFFICE VISIT (OUTPATIENT)
Dept: DERMATOLOGY | Facility: CLINIC | Age: 87
End: 2022-08-08
Payer: COMMERCIAL

## 2022-08-08 DIAGNOSIS — B35.4 TINEA CORPORIS: ICD-10-CM

## 2022-08-08 DIAGNOSIS — B35.3 TINEA PEDIS OF BOTH FEET: Primary | ICD-10-CM

## 2022-08-08 DIAGNOSIS — L82.0 INFLAMED SEBORRHEIC KERATOSIS: ICD-10-CM

## 2022-08-08 DIAGNOSIS — L21.9 DERMATITIS, SEBORRHEIC: ICD-10-CM

## 2022-08-08 PROCEDURE — 99000 SPECIMEN HANDLING OFFICE-LAB: CPT | Performed by: PATHOLOGY

## 2022-08-08 PROCEDURE — 87101 SKIN FUNGI CULTURE: CPT | Mod: 90 | Performed by: PATHOLOGY

## 2022-08-08 PROCEDURE — 87107 FUNGI IDENTIFICATION MOLD: CPT | Mod: 90 | Performed by: PATHOLOGY

## 2022-08-08 PROCEDURE — 99214 OFFICE O/P EST MOD 30 MIN: CPT | Mod: 25 | Performed by: DERMATOLOGY

## 2022-08-08 PROCEDURE — 17110 DESTRUCTION B9 LES UP TO 14: CPT | Performed by: DERMATOLOGY

## 2022-08-08 RX ORDER — KETOCONAZOLE 20 MG/G
CREAM TOPICAL
Qty: 60 G | Refills: 6 | Status: SHIPPED | OUTPATIENT
Start: 2022-08-08 | End: 2022-12-05

## 2022-08-08 RX ORDER — KETOCONAZOLE 20 MG/ML
SHAMPOO TOPICAL
Qty: 120 ML | Refills: 1 | Status: ON HOLD | OUTPATIENT
Start: 2022-08-08 | End: 2024-01-01

## 2022-08-08 ASSESSMENT — PAIN SCALES - GENERAL: PAINLEVEL: NO PAIN (0)

## 2022-08-08 NOTE — NURSING NOTE
Dermatology Rooming Note    Noe Florence's goals for this visit include:   Chief Complaint   Patient presents with     Skin Check     Rash on chest, fungus on feet, spot on head      Julita Morris, Visit Facilitator

## 2022-08-08 NOTE — LETTER
8/8/2022       RE: Noe Florence  [address] [facility] [address]     Dear Colleague,    Thank you for referring your patient, Noe Florence, to the [facility] [place] [facility]. Please see a copy of my visit note below.    [facility] Dermatology Note  Encounter Date: Aug 8, 2022  Office Visit     Dermatology Problem List:  1. Innumerable seborrheic keratoses,inflammed and non-inflammed   - cryotherapy 10/27/30  2. History of tinea pedis (10/2020), corporis (8/2013, 2015), and cruris (8/2013)  - current: ketoconazole 2% cream BID to feet when flaring, otherwise 3-4 times weekly  - prior: terbinafine cream   3. Stasis dermatitis  4. Seborrheic dermatitis  - ketoconazole 2% shampoo     ____________________________________________    Assessment & Plan:    # Innumberable seborrheic keratoses.  Some on scalp are bothersome to patient at today's visit. Benign nature discussed, patient reassured, no treatment indicated.   - LN x5 to vertex scalp; see procedure note      # Tinea pedis; recurrent, active  Overall much improved compared to last visit. Does have some white flaking of sole and heels. Recommend continued maintenance therapy with ketoconazole 2% cream 3-4 times weekly. If flaring can use this twice daily x 2 weeks, then back to maintenance.   - ketoconazole cream rx renewed     #Tinea corporis  - KOH positive in clinic today   - fungal cx obtained in clinic today   - can use ketoconazole 2% topical cream BID to affected areas as needed    #Seborrheic dermatitis  - scalp, ears  - ordered ketoconazole 2% shampoo daily      # Stasis dermatitis  Overall well-controlled.   Continue using compression stockings daily.      #Benign lesions: Multiple benign nevi, solar lentigos. Explained to patient benign nature of lesion. No treatment is necessary at this time unless the lesion changes or becomes symptomatic.       - ABCDs of melanoma were discussed and self skin checks were advised.  - Sun precaution was advised including the use of sun screens of SPF 30 or higher, sun protective clothing, and avoidance of tanning beds.    Procedures Performed:   - KOH prep was obtained and interpreted as positive.  - Fungal cutures obtained in clinic today   - Cryotherapy procedure note, location(s): vertex scalp. After verbal consent and discussion of risks and benefits including, but not limited to, dyspigmentation/scar, blister, and pain, 5 lesion(s) was(were) treated with 1-2 mm freeze border for 1-2 cycles with liquid nitrogen. Post cryotherapy instructions were provided.    Follow-up: 4-5 month(s) in-person, or earlier for new or changing lesions    Staff and Resident: Dr. Jackson    Scribe Disclosure:  I, VIBHA MILLER, am serving as a scribe to document services personally performed by Lashawn Jackson MD based on data collection and the provider's statements to me.     Provider Disclosure:   The documentation recorded by the scribe accurately reflects the services I personally performed and the decisions made by me.    Lashawn Jackson MD  Professor and Chair  Department of Dermatology  [facility]: Phone: [phone], Fax:[phone]  [facility]: Phone: [phone], Fax: [phone]          Pt seen and discussed with attending physician, Dr Manuel Gee, MS4    Staff Physician:  I was present with the medical student who participated in the service and in the documentation of the note. I have verified the history and personally performed the physical exam and medical decision making. I agree with the assessment and plan of care as documented in the note.  The cryotherapy procedure was done together.  LEO was interpreted by me.      Lashawn Jackson MD  Professor and Chair  Department  of Dermatology  [facility]: Phone: [phone], Fax:[phone]  [facility]: Phone: [phone], Fax: [phone]          ____________________________________________    CC: Skin Check (Rash on chest, fungus on feet, spot on head )    HPI:  Mr. Noe Florence is a(n) 87 year old male who presents today as a return patient for FBSE. Last seen by Dr. Vásquez on 6/17/21, at which time had no lesions of concern.     Pt had recent fall and today his wife is the primary historian. Today the pair has concerns about irritated scaly spots on the scalp. Pt has had SKs frozen off the scalp in the past. Additionally, has had persistent scaling of both feet bilaterally and new onset rash of the upper chest and bilateral axilla. Pt's wife has been applying ketoconazole 2% cream to pt's feet 5x/wk. Otherwise using daily moisturizer. Wears compression stockings daily.     Denies any itching, bleeding/non-healing, rapidly-evolving, or otherwise concerning lesions today.      Patient is otherwise feeling well, without additional skin concerns.    Labs Reviewed:  N/A    Physical Exam:  Vitals: There were no vitals taken for this visit.  SKIN: Total skin excluding the undergarment areas was performed. The exam included the head/face, neck, both arms, chest, back, abdomen, both legs, digits and/or nails.   - Numerous regular brown pigmented macules and papules are identified on the trunk and extremities.   - Scattered brown macules on sun exposed areas.  - Innumerable waxy stuck on tan to brown papules on the trunk, extremities, and scalp   - Diffuse greasy scale prominent along occipital scalp and bilateral ears  - Moderate mykel hyperpigmentation of ankles bilaterally without evidence of ulceration or excessive edema  - small superficial erosions on dorsal aspect of bilaterals DIP's on toes 1-3; no evidence of secondary  infection   - No other lesions of concern on areas examined.      Medications:  Current Outpatient Medications   Medication     alendronate (FOSAMAX) 70 MG tablet     ARIPiprazole (ABILIFY) 2 MG tablet     calcium carbonate 500 mg, elemental, (OSCAL;OYSTER SHELL CALCIUM) 500 MG tablet     cholecalciferol 1000 units TABS     COMPRESSION STOCKINGS     cyanocobalamin (VITAMIN B-12) 1000 MCG tablet     ferrous sulfate (FE TABS) 325 (65 Fe) MG EC tablet     ketoconazole (NIZORAL) 2 % external cream     metoprolol tartrate (LOPRESSOR) 25 MG tablet     mirtazapine (REMERON) 15 MG tablet     Multiple Vitamins-Minerals (MULTIVITAMIN ADULT PO)     sertraline (ZOLOFT) 100 MG tablet     tamsulosin (FLOMAX) 0.4 MG capsule     tiZANidine (ZANAFLEX) 2 MG tablet     warfarin ANTICOAGULANT (JANTOVEN ANTICOAGULANT) 5 MG tablet     No current facility-administered medications for this visit.      Past Medical History:   Patient Active Problem List   Diagnosis     Rotator cuff (capsule) sprain     Other postprocedural status(V45.89)     Hyperkalemia     Anemia     Diarrhea     Diverticulitis of colon     Hypertension     CAD (coronary artery disease)     NSVT (nonsustained ventricular tachycardia) (H)     NSTEMI (non-ST elevated myocardial infarction) (H)     Automatic implantable cardioverter-defibrillator - [employer]tronic dual chamber ICD - Not Dependent     Skin exam, screening for cancer     Tinea cruris     Tinea corporis     Colon cancer (H)     Polymyalgia rheumatica (H)     S/P ablation of atrial flutter     Primary open angle glaucoma     Atrial fibrillation (H) [I48.91]     Long-term (current) use of anticoagulants [Z79.01]     Ischemic cardiomyopathy     CKD (chronic kidney disease), stage III (H)     Weight loss, unintentional     Skin infection     Tinea pedis of both feet     Venous stasis dermatitis of both lower extremities     Actinic keratosis     Inflamed seborrheic keratosis     Closed left subtrochanteric femur fracture  (H)     Hip fracture (H)     Other osteoporosis without current pathological fracture     Deep vein thrombosis (DVT) (H)     Other closed nondisplaced fracture of distal end of humerus, unspecified laterality, initial encounter     Syncope and collapse     Atrial fibrillation, unspecified type (H)     Past Medical History:   Diagnosis Date     Advanced open-angle glaucoma      Atrial fibrillation (H)      CKD (chronic kidney disease), stage III (H) 2005     Colon cancer (H)     Stage II-B colon cancer     Coronary artery disease     s/p CABG x 2, JEREMY x 2     Diverticulitis      Hyperlipidemia      Hypertension      Ischemic cardiomyopathy      MGUS (monoclonal gammopathy of unknown significance)      Nonsenile cataract     BE     Osteoporosis     left hip fracture     Polymorphic ventricular tachycardia (H)      Polymyalgia rheumatica (H)      PVD (posterior vitreous detachment), both eyes 2005     S/P ablation of atrial flutter 6/20/14    CTI     Stented coronary artery      SVT (supraventricular tachycardia) (H)     PPM/AICD for NSVT     Upper leg DVT (deep venous thromboembolism), chronic (H)     Left     Weight loss, unintentional 2017    15 lb in 4 months        CC Referred Self, MD  No address on file on close of this encounter.      Again, thank you for allowing me to participate in the care of your patient.      Sincerely,    Lashawn Jackson MD

## 2022-08-08 NOTE — PROGRESS NOTES
[facility] Dermatology Note  Encounter Date: Aug 8, 2022  Office Visit     Dermatology Problem List:  1. Innumerable seborrheic keratoses,inflammed and non-inflammed   - cryotherapy 10/27/30  2. History of tinea pedis (10/2020), corporis (8/2013, 2015), and cruris (8/2013)  - current: ketoconazole 2% cream BID to feet when flaring, otherwise 3-4 times weekly  - prior: terbinafine cream   3. Stasis dermatitis  4. Seborrheic dermatitis  - ketoconazole 2% shampoo     ____________________________________________    Assessment & Plan:    # Innumberable seborrheic keratoses.  Some on scalp are bothersome to patient at today's visit. Benign nature discussed, patient reassured, no treatment indicated.   - LN x5 to vertex scalp; see procedure note      # Tinea pedis; recurrent, active  Overall much improved compared to last visit. Does have some white flaking of sole and heels. Recommend continued maintenance therapy with ketoconazole 2% cream 3-4 times weekly. If flaring can use this twice daily x 2 weeks, then back to maintenance.   - ketoconazole cream rx renewed     #Tinea corporis  - KOH positive in clinic today   - fungal cx obtained in clinic today   - can use ketoconazole 2% topical cream BID to affected areas as needed    #Seborrheic dermatitis  - scalp, ears  - ordered ketoconazole 2% shampoo daily      # Stasis dermatitis  Overall well-controlled.   Continue using compression stockings daily.      #Benign lesions: Multiple benign nevi, solar lentigos. Explained to patient benign nature of lesion. No treatment is necessary at this time unless the lesion changes or becomes symptomatic.      - ABCDs of melanoma were discussed and self skin checks were advised.  - Sun precaution was advised including the use of sun screens of SPF 30 or higher, sun protective clothing, and avoidance of tanning beds.    Procedures Performed:   - KOH prep was obtained and interpreted as positive.  - Fungal cutures  obtained in clinic today   - Cryotherapy procedure note, location(s): vertex scalp. After verbal consent and discussion of risks and benefits including, but not limited to, dyspigmentation/scar, blister, and pain, 5 lesion(s) was(were) treated with 1-2 mm freeze border for 1-2 cycles with liquid nitrogen. Post cryotherapy instructions were provided.    Follow-up: 4-5 month(s) in-person, or earlier for new or changing lesions    Staff and Resident: Dr. Jackson    Scribe Disclosure:  I, VIBHA MILLER, am serving as a scribe to document services personally performed by Lashawn Jackson MD based on data collection and the provider's statements to me.     Provider Disclosure:   The documentation recorded by the scribe accurately reflects the services I personally performed and the decisions made by me.    Lashawn Jackson MD  Professor and Chair  Department of Dermatology  [facility]: Phone: [phone], Fax:[phone]  [facility] [place]: Phone: [phone], Fax: [phone]          Pt seen and discussed with attending physician, Dr Manuel Gee, MS4    Staff Physician:  I was present with the medical student who participated in the service and in the documentation of the note. I have verified the history and personally performed the physical exam and medical decision making. I agree with the assessment and plan of care as documented in the note.  The cryotherapy procedure was done together.  LEO was interpreted by me.      Lashawn Jackson MD  Professor and Chair  Department of Dermatology  [facility]: Phone: [phone], Fax:[phone]  [facility]: Phone: [phone], Fax: [phone]          ____________________________________________    CC: Skin Check (Rash on chest,  fungus on feet, spot on head )    HPI:  Mr. Noe Florence is a(n) 87 year old male who presents today as a return patient for [facility]. Last seen by Dr. Vásquez on 6/17/21, at which time had no lesions of concern.     Pt had recent fall and today his wife is the primary historian. Today the pair has concerns about irritated scaly spots on the scalp. Pt has had SKs frozen off the scalp in the past. Additionally, has had persistent scaling of both feet bilaterally and new onset rash of the upper chest and bilateral axilla. Pt's wife has been applying ketoconazole 2% cream to pt's feet 5x/wk. Otherwise using daily moisturizer. Wears compression stockings daily.     Denies any itching, bleeding/non-healing, rapidly-evolving, or otherwise concerning lesions today.      Patient is otherwise feeling well, without additional skin concerns.    Labs Reviewed:  N/A    Physical Exam:  Vitals: There were no vitals taken for this visit.  SKIN: Total skin excluding the undergarment areas was performed. The exam included the head/face, neck, both arms, chest, back, abdomen, both legs, digits and/or nails.   - Numerous regular brown pigmented macules and papules are identified on the trunk and extremities.   - Scattered brown macules on sun exposed areas.  - Innumerable waxy stuck on tan to brown papules on the trunk, extremities, and scalp   - Diffuse greasy scale prominent along occipital scalp and bilateral ears  - Moderate mykel hyperpigmentation of ankles bilaterally without evidence of ulceration or excessive edema  - small superficial erosions on dorsal aspect of bilaterals DIP's on toes 1-3; no evidence of secondary infection   - No other lesions of concern on areas examined.      Medications:  Current Outpatient Medications   Medication     alendronate (FOSAMAX) 70 MG tablet     ARIPiprazole (ABILIFY) 2 MG tablet     calcium carbonate 500 mg, elemental, (OSCAL;OYSTER SHELL CALCIUM) 500 MG tablet     cholecalciferol 1000 units  TABS     COMPRESSION STOCKINGS     cyanocobalamin (VITAMIN B-12) 1000 MCG tablet     ferrous sulfate (FE TABS) 325 (65 Fe) MG EC tablet     ketoconazole (NIZORAL) 2 % external cream     metoprolol tartrate (LOPRESSOR) 25 MG tablet     mirtazapine (REMERON) 15 MG tablet     Multiple Vitamins-Minerals (MULTIVITAMIN ADULT PO)     sertraline (ZOLOFT) 100 MG tablet     tamsulosin (FLOMAX) 0.4 MG capsule     tiZANidine (ZANAFLEX) 2 MG tablet     warfarin ANTICOAGULANT (JANTOVEN ANTICOAGULANT) 5 MG tablet     No current facility-administered medications for this visit.      Past Medical History:   Patient Active Problem List   Diagnosis     Rotator cuff (capsule) sprain     Other postprocedural status(V45.89)     Hyperkalemia     Anemia     Diarrhea     Diverticulitis of colon     Hypertension     CAD (coronary artery disease)     NSVT (nonsustained ventricular tachycardia) (H)     NSTEMI (non-ST elevated myocardial infarction) (H)     Automatic implantable cardioverter-defibrillator - Medtronic dual chamber ICD - Not Dependent     Skin exam, screening for cancer     Tinea cruris     Tinea corporis     Colon cancer (H)     Polymyalgia rheumatica (H)     S/P ablation of atrial flutter     Primary open angle glaucoma     Atrial fibrillation (H) [I48.91]     Long-term (current) use of anticoagulants [Z79.01]     Ischemic cardiomyopathy     CKD (chronic kidney disease), stage III (H)     Weight loss, unintentional     Skin infection     Tinea pedis of both feet     Venous stasis dermatitis of both lower extremities     Actinic keratosis     Inflamed seborrheic keratosis     Closed left subtrochanteric femur fracture (H)     Hip fracture (H)     Other osteoporosis without current pathological fracture     Deep vein thrombosis (DVT) (H)     Other closed nondisplaced fracture of distal end of humerus, unspecified laterality, initial encounter     Syncope and collapse     Atrial fibrillation, unspecified type (H)     Past Medical  History:   Diagnosis Date     Advanced open-angle glaucoma      Atrial fibrillation (H)      CKD (chronic kidney disease), stage III (H) 2005     Colon cancer (H)     Stage II-B colon cancer     Coronary artery disease     s/p CABG x 2, JEREMY x 2     Diverticulitis      Hyperlipidemia      Hypertension      Ischemic cardiomyopathy      MGUS (monoclonal gammopathy of unknown significance)      Nonsenile cataract     BE     Osteoporosis     left hip fracture     Polymorphic ventricular tachycardia (H)      Polymyalgia rheumatica (H)      PVD (posterior vitreous detachment), both eyes 2005     S/P ablation of atrial flutter 6/20/14    CTI     Stented coronary artery      SVT (supraventricular tachycardia) (H)     PPM/AICD for NSVT     Upper leg DVT (deep venous thromboembolism), chronic (H)     Left     Weight loss, unintentional 2017    15 lb in 4 months        CC Referred Self, MD  No address on file on close of this encounter.

## 2022-08-16 ENCOUNTER — ANTICOAGULATION THERAPY VISIT (OUTPATIENT)
Dept: ANTICOAGULATION | Facility: CLINIC | Age: 87
End: 2022-08-16

## 2022-08-16 ENCOUNTER — LAB (OUTPATIENT)
Dept: LAB | Facility: CLINIC | Age: 87
End: 2022-08-16
Payer: COMMERCIAL

## 2022-08-16 DIAGNOSIS — I48.91 ATRIAL FIBRILLATION (H): Primary | ICD-10-CM

## 2022-08-16 DIAGNOSIS — I82.409 DEEP VEIN THROMBOSIS (DVT) (H): ICD-10-CM

## 2022-08-16 DIAGNOSIS — I48.91 ATRIAL FIBRILLATION, UNSPECIFIED TYPE (H): ICD-10-CM

## 2022-08-16 DIAGNOSIS — Z79.01 LONG TERM CURRENT USE OF ANTICOAGULANT THERAPY: ICD-10-CM

## 2022-08-16 LAB — INR BLD: 2.3 (ref 0.9–1.1)

## 2022-08-16 PROCEDURE — 85610 PROTHROMBIN TIME: CPT | Performed by: PATHOLOGY

## 2022-08-16 PROCEDURE — 36415 COLL VENOUS BLD VENIPUNCTURE: CPT | Performed by: PATHOLOGY

## 2022-08-16 NOTE — PROGRESS NOTES
ANTICOAGULATION MANAGEMENT     Noe Florence 87 year old male is on warfarin with therapeutic INR result. (Goal INR 1.5-2.5)    Recent labs: (last 7 days)     08/16/22  1656   INR 2.3*       ASSESSMENT       Source(s): Patient/Caregiver Call       Warfarin doses taken: Warfarin taken as instructed    Diet: No new diet changes identified    New illness, injury, or hospitalization: No    Medication/supplement changes: None noted    Signs or symptoms of bleeding or clotting: No    Previous INR: Therapeutic last 2(+) visits    Additional findings: None       PLAN     Recommended plan for no diet, medication or health factor changes affecting INR     Dosing Instructions: Continue your current warfarin dose with next INR in 3 weeks       Summary  As of 8/16/2022    Full warfarin instructions:  5 mg every day   Next INR check:  9/6/2022             Telephone call with  Rica  who verbalizes understanding and agrees to plan and who agrees to plan and repeated back plan correctly    Lab visit scheduled    Education provided: None required    Plan made per [facility] anticoagulation protocol    Nguyen Zuniga RN  Anticoagulation Clinic  8/16/2022    _______________________________________________________________________     Anticoagulation Episode Summary     Current INR goal:  Other - see comment   TTR:  59.5 % (1 y)   Target end date:  Indefinite   Send INR reminders to:  [facility]    Indications    Atrial fibrillation (H) [I48.91] [I48.91]  Long-term (current) use of anticoagulants [Z79.01] [Z79.01]  Deep vein thrombosis (DVT) (H) [I82.409]  Atrial fibrillation  unspecified type (H) [I48.91]           Comments:  Hx of Falls/Bleed  Goal range: 1.5-2.5         Anticoagulation Care Providers     Provider Role Specialty Phone number    Frida Desai MD Referring Cardiovascular Disease [phone]    [facility] MD Referring Internal Medicine [phone]

## 2022-08-23 LAB — BACTERIA SKIN AEROBE CULT: ABNORMAL

## 2022-08-25 ENCOUNTER — TELEPHONE (OUTPATIENT)
Dept: DERMATOLOGY | Facility: CLINIC | Age: 87
End: 2022-08-25

## 2022-08-25 NOTE — TELEPHONE ENCOUNTER
----- Message from Lashawn Jackson MD sent at 8/25/2022  9:17 AM CDT -----  Regarding: followup on fungal culture  Matthew NICOLE, can you call this patient and let him know that the fungal culture was positive. Please check if the cream we prescribed earlier was month is working   Regards,  Lashawn Jackson MD

## 2022-08-25 NOTE — TELEPHONE ENCOUNTER
LVM requesting patient call back to discuss lab results. Patient does not appear active on [employer]. Have not sent message.     Matthew Ramsay, EMT

## 2022-08-30 NOTE — TELEPHONE ENCOUNTER
Called patient. Patient's  Rica Levine answered the phone. Consent to communicate from 2014 found. Verified correct patient by his last name and . Informed Rica of Dr. Jackson's message. States she is unsure if the cream is working but she believes it is. Asked her to confirm with patient and then send us a mychart or give us a call. States she will do so. Requested I send a message to Dr. Jackson to see if we can squeeze him in for his follow-up in . She had no other questions or concerns.    Matthew Ramsay, EMT

## 2022-09-02 NOTE — TELEPHONE ENCOUNTER
Lashawn Jackson MD Flores, Marc Caller: Unspecified (1 week ago)  Thanks for the update.   Please be sure the patient continues to apply the topical medication twice a day for at least 1 week past clinical clearing of the rash.   Can we offer a cancellation spot in October or November and am trying to add a clinic Monday morning, January 16. It we can open that clinic then, it would be great for Mr. Florence to come to this clinic. Am waiting to hear back from Lashawn GATES if this will work.   Regards,   MAXI

## 2022-09-19 ENCOUNTER — OFFICE VISIT (OUTPATIENT)
Dept: INTERNAL MEDICINE | Facility: CLINIC | Age: 87
End: 2022-09-19
Payer: COMMERCIAL

## 2022-09-19 ENCOUNTER — ANCILLARY PROCEDURE (OUTPATIENT)
Dept: GENERAL RADIOLOGY | Facility: CLINIC | Age: 87
End: 2022-09-19
Attending: INTERNAL MEDICINE
Payer: COMMERCIAL

## 2022-09-19 ENCOUNTER — LAB (OUTPATIENT)
Dept: LAB | Facility: CLINIC | Age: 87
End: 2022-09-19

## 2022-09-19 VITALS
OXYGEN SATURATION: 99 % | DIASTOLIC BLOOD PRESSURE: 68 MMHG | BODY MASS INDEX: 24.28 KG/M2 | SYSTOLIC BLOOD PRESSURE: 133 MMHG | HEART RATE: 61 BPM | HEIGHT: 67 IN

## 2022-09-19 DIAGNOSIS — M62.81 MUSCLE WEAKNESS (GENERALIZED): ICD-10-CM

## 2022-09-19 DIAGNOSIS — R53.81 PHYSICAL DECONDITIONING: ICD-10-CM

## 2022-09-19 DIAGNOSIS — M25.562 LEFT KNEE PAIN, UNSPECIFIED CHRONICITY: Primary | ICD-10-CM

## 2022-09-19 DIAGNOSIS — M21.379 FOOT-DROP, UNSPECIFIED LATERALITY: ICD-10-CM

## 2022-09-19 DIAGNOSIS — R29.6 FALLS FREQUENTLY: ICD-10-CM

## 2022-09-19 DIAGNOSIS — I82.409 DEEP VEIN THROMBOSIS (DVT) (H): ICD-10-CM

## 2022-09-19 DIAGNOSIS — M81.8 OTHER OSTEOPOROSIS WITHOUT CURRENT PATHOLOGICAL FRACTURE: ICD-10-CM

## 2022-09-19 DIAGNOSIS — M25.562 LEFT KNEE PAIN, UNSPECIFIED CHRONICITY: ICD-10-CM

## 2022-09-19 DIAGNOSIS — I48.91 ATRIAL FIBRILLATION, UNSPECIFIED TYPE (H): ICD-10-CM

## 2022-09-19 LAB — INR BLD: 1.7 (ref 0.9–1.1)

## 2022-09-19 PROCEDURE — 73562 X-RAY EXAM OF KNEE 3: CPT | Mod: LT | Performed by: RADIOLOGY

## 2022-09-19 PROCEDURE — 36415 COLL VENOUS BLD VENIPUNCTURE: CPT | Performed by: PATHOLOGY

## 2022-09-19 PROCEDURE — 99214 OFFICE O/P EST MOD 30 MIN: CPT | Performed by: INTERNAL MEDICINE

## 2022-09-19 PROCEDURE — 85610 PROTHROMBIN TIME: CPT | Performed by: PATHOLOGY

## 2022-09-19 NOTE — NURSING NOTE
Noe Florence is a 87 year old male patient that presents today in clinic for the following:    Chief Complaint   Patient presents with     RECHECK     Left foot swelling and pain     The patient's allergies and medications were reviewed as noted. A set of vitals were recorded as noted without incident. The patient does not have any other questions for the provider.    Dariusz Conner, EMT at 3:26 PM on 9/19/2022

## 2022-09-19 NOTE — PROGRESS NOTES
HPI  87-year-old presents today with his wife for left knee pain.  He apparently did new set of exercises with his son yesterday was transiently quite aggressive with this and this morning he awoke with painful swollen left knee and was unable to move unable to get out of bed.  The PCA was present eventually they are able to get him in a wheelchair.  The ice the knee and this has resulted in some improvement and reduction in pain and improved strength.  He denies any injury or trauma.  There is no fever chills or sweats.  No cords or erythema no change in his present medications.  Otherwise he has been working with therapy he is fallen on 2 occasions in the last couple of months but on neither occasion was he heard significantly  Past Medical History:   Diagnosis Date     Advanced open-angle glaucoma      Atrial fibrillation (H)      CKD (chronic kidney disease), stage III (H) 2005     Colon cancer (H)     Stage II-B colon cancer     Coronary artery disease     s/p CABG x 2, JEREMY x 2     Diverticulitis      Hyperlipidemia      Hypertension      Ischemic cardiomyopathy      MGUS (monoclonal gammopathy of unknown significance)      Nonsenile cataract     BE     Osteoporosis     left hip fracture     Polymorphic ventricular tachycardia (H)      Polymyalgia rheumatica (H)      PVD (posterior vitreous detachment), both eyes 2005     S/P ablation of atrial flutter 6/20/14    CTI     Stented coronary artery      SVT (supraventricular tachycardia) (H)     PPM/AICD for NSVT     Upper leg DVT (deep venous thromboembolism), chronic (H)     Left     Weight loss, unintentional 2017    15 lb in 4 months     Past Surgical History:   Procedure Laterality Date     CATARACT IOL, RT/LT Bilateral      COLONOSCOPY  3/13/2014    Procedure: COMBINED COLONOSCOPY, SINGLE BIOPSY/POLYPECTOMY BY BIOPSY;;  Surgeon: Mary Gerber MD;  Location:  GI     COLONOSCOPY N/A 6/22/2015    Procedure: COLONOSCOPY;  Surgeon: Marilin Newman  MD Marylu;  Location:  GI     COLONOSCOPY N/A 11/7/2018    Procedure: COMBINED COLONOSCOPY, SINGLE OR MULTIPLE BIOPSY/POLYPECTOMY BY BIOPSY;  Surgeon: Roberto Esteabn MD;  Location:  GI     EP ICD GENERATOR REPLACEMENT DUAL N/A 12/11/2018    Procedure: EP ICD Generator Change Dual;  Surgeon: Deedee Baird MD;  Location:  HEART CARDIAC CATH LAB     H ABLATION ATRIAL FLUTTER       HEART CATH DRUG ELUTING STENT PLACEMENT  4/19/2012    JEREMY x 2 to LCx     IMPLANT IMPLANTABLE CARDIOVERTER DEFIBRILLATOR  5-    ppm/aicd     KNEE SURGERY      right and left knee surgeries     LAPAROSCOPIC ASSISTED COLECTOMY Right 2/1/2019    Procedure: Laparoscopic Converted to Open Transverse Colectomy with Lysis of Adhesions;  Surgeon: Chanelle Guzmán MD;  Location:  OR     LAPAROSCOPIC ASSISTED COLECTOMY LEFT (DESCENDING)  4/8/2014    Procedure: LAPAROSCOPIC ASSISTED COLECTOMY LEFT (DESCENDING);  Hand assisted Laparoscopic Sigmoid Colectomy , Laparoscopic mobilization of spleenic flexure *Latex Allergy*Anesthesia General with Block;  Surgeon: Chanelle Guzmán MD;  Location:  OR     OPEN REDUCTION INTERNAL FIXATION RODDING INTRAMEDULLAR FEMUR FRACTURE TABLE Left 3/14/2019    Procedure: Open Reduction Internal Fixation Left Femur Intramedullar Nailing;  Surgeon: Srikanth Avalos MD;  Location:  OR     REPAIR VALVE MITRAL  2006     30-mm Medtronic Julian ring      SELECTIVE LASER TRABECULOPLASTY (SLT) OD (RIGHT EYE)  4/10, 1/12,+1/9/13    RE     SELECTIVE LASER TRABECULOPLASTY (SLT) OS (LEFT EYE)  5/2012     SHOULDER SURGERY      right rotator cuff     ZZC CABG, ARTERY-VEIN, FOUR  2006    LIMA-LAD, SVG-Rt PDA, SVG-OM2, SVG-Diag 1     Z CABG, VEIN, SINGLE  1994    1-vessel CABG, SVG->PDRCA      Family History   Problem Relation Age of Onset     C.A.D. Father      Anesthesia Reaction No family hx of      Crohn's Disease No family hx of      Ulcerative Colitis No family hx of      Cancer  "- colorectal No family hx of      Macular Degeneration No family hx of      Cancer No family hx of         No known family hx of skin cancer     Melanoma No family hx of      Skin Cancer No family hx of          Exam:  /68 (BP Location: Right arm, Patient Position: Sitting, Cuff Size: Adult Regular)   Pulse 61   Ht 1.702 m (5' 7\")   SpO2 99%   BMI 24.28 kg/m    The patient is withdrawn and quiet   Skin shows no lesions or rashes and good turgor.   Head is normocephalic and atraumatic.    Extremities show bilateral knee scars synovial thickening and hypertrophy of the left knee with tenderness along the joint line no warmth or apparent effusion.  He does have extension of the knee but lacks the final 10 degrees.  Trace pretibial edema.  Neurologic exam shows straight leg raising negative bilaterally he has excellent strength in the lower extremities 5 out of 5 knee flexors and extensors    Left knee x-ray was reviewed showing severe degenerative changes previous femur rodding      ASSESSMENT  1 Falls secondary to muscle weakness, neuropathy, foot drop and deconditioning  2 DJD left knee with flare  3 history colon cancer S/P laparoscopic resection 2019  4 hypertension well controlled  5 hyperlipidemia on atorvastatin  6 renal insufficiency slight improvement  7 anemia with elevated MMA  8 atrial fibrillation on warfarin  9 peripheral arterial disease  10 monoclonal gammopathy stable  11 Hip fracture with nailing 3/14/2019  12 CAD S/P CABGx2 with JEREMY  13 V Tach S/P ICD  14 osteoporosis on alendronate  15 venous insufficiency with history  DVT  16 History depression on 3 drugs per psychiatry  17 LUTS  on tamsulosin    Plan  Focus on isometric strengthening exercises not repetitive flexion extension as he was doing yesterday.  Continue to focus on icing for the next day or 2 and then switch to heat will have him use acetaminophen 1000 mg 3 times a day and follow-up if symptoms do not continue to improve with " this  Over 30 minutes spent on the day of service in chart review, patient contact, record completion and review and notification of lab reports    This note was completed using Dragon voice recognition software.      Roberto Sarmiento MD  General Internal Medicine  [facility]  [phone]

## 2022-09-20 ENCOUNTER — ANTICOAGULATION THERAPY VISIT (OUTPATIENT)
Dept: ANTICOAGULATION | Facility: CLINIC | Age: 87
End: 2022-09-20

## 2022-09-20 DIAGNOSIS — I48.91 ATRIAL FIBRILLATION, UNSPECIFIED TYPE (H): ICD-10-CM

## 2022-09-20 DIAGNOSIS — Z79.01 LONG TERM CURRENT USE OF ANTICOAGULANT THERAPY: ICD-10-CM

## 2022-09-20 DIAGNOSIS — I48.91 ATRIAL FIBRILLATION (H): Primary | ICD-10-CM

## 2022-09-20 DIAGNOSIS — I82.409 DEEP VEIN THROMBOSIS (DVT) (H): ICD-10-CM

## 2022-09-20 NOTE — PROGRESS NOTES
ANTICOAGULATION MANAGEMENT     Noe Florence 87 year old male is on warfarin with subtherapeutic INR result. (Goal INR 1.5-2.5)    Recent labs: (last 7 days)     09/19/22  1727   INR 1.7*       ASSESSMENT       Source(s): Chart Review and Patient/Caregiver Call       Warfarin doses taken: Warfarin taken as instructed-small possibility that a dose was missed while Rica was away    Diet: No new diet changes identified    New illness, injury, or hospitalization: No    Medication/supplement changes: None noted    Signs or symptoms of bleeding or clotting: No    Previous INR: Therapeutic last 2(+) visits    Additional findings: None       PLAN     Recommended plan for no diet, medication or health factor changes affecting INR     Dosing Instructions: Continue your current warfarin dose with next INR in 2 weeks       Summary  As of 9/20/2022    Full warfarin instructions:  5 mg every day   Next INR check:  10/3/2022             Telephone call with Sha's friend, Rica who verbalizes understanding and agrees to plan and who agrees to plan and repeated back plan correctly    Check at provider office visit    Education provided: Goal range and significance of current result and Contact [phone] with any changes, questions or concerns.     Plan made per [facility] anticoagulation protocol    BEVERLEY LOONEY RN  Anticoagulation Clinic  9/20/2022    _______________________________________________________________________     Anticoagulation Episode Summary     Current INR goal:  Other - see comment   TTR:  54.6 % (1 y)   Target end date:  Indefinite   Send INR reminders to:  [facility]    Indications    Atrial fibrillation (H) [I48.91] [I48.91]  Long-term (current) use of anticoagulants [Z79.01] [Z79.01]  Deep vein thrombosis (DVT) (H) [I82.409]  Atrial fibrillation  unspecified type (H) [I48.91]           Comments:  Hx of Falls/Bleed  Goal range: 1.5-2.5         Anticoagulation Care Providers     Provider Role  Specialty Phone number    Frida Desai MD Referring Cardiovascular Disease [phone]    Roberto Sarmiento MD Referring Internal Medicine [phone]

## 2022-10-03 ENCOUNTER — ANTICOAGULATION THERAPY VISIT (OUTPATIENT)
Dept: ANTICOAGULATION | Facility: CLINIC | Age: 87
End: 2022-10-03

## 2022-10-03 ENCOUNTER — ONCOLOGY VISIT (OUTPATIENT)
Dept: ONCOLOGY | Facility: CLINIC | Age: 87
End: 2022-10-03
Attending: NURSE PRACTITIONER
Payer: COMMERCIAL

## 2022-10-03 ENCOUNTER — APPOINTMENT (OUTPATIENT)
Dept: LAB | Facility: CLINIC | Age: 87
End: 2022-10-03
Attending: NURSE PRACTITIONER
Payer: COMMERCIAL

## 2022-10-03 VITALS
TEMPERATURE: 98 F | HEART RATE: 71 BPM | OXYGEN SATURATION: 96 % | SYSTOLIC BLOOD PRESSURE: 115 MMHG | DIASTOLIC BLOOD PRESSURE: 59 MMHG | RESPIRATION RATE: 16 BRPM

## 2022-10-03 DIAGNOSIS — C18.9 MALIGNANT NEOPLASM OF COLON, UNSPECIFIED PART OF COLON (H): ICD-10-CM

## 2022-10-03 DIAGNOSIS — I82.409 DEEP VEIN THROMBOSIS (DVT) (H): ICD-10-CM

## 2022-10-03 DIAGNOSIS — I48.91 ATRIAL FIBRILLATION (H): Primary | ICD-10-CM

## 2022-10-03 DIAGNOSIS — R79.89 HIGH SERUM METHYLMALONATE: ICD-10-CM

## 2022-10-03 DIAGNOSIS — R97.8 ELEVATED TUMOR MARKERS: ICD-10-CM

## 2022-10-03 DIAGNOSIS — I48.91 ATRIAL FIBRILLATION, UNSPECIFIED TYPE (H): ICD-10-CM

## 2022-10-03 DIAGNOSIS — D64.9 ANEMIA, UNSPECIFIED TYPE: ICD-10-CM

## 2022-10-03 DIAGNOSIS — Z79.01 LONG TERM CURRENT USE OF ANTICOAGULANT THERAPY: ICD-10-CM

## 2022-10-03 DIAGNOSIS — Z23 NEED FOR PROPHYLACTIC VACCINATION AND INOCULATION AGAINST INFLUENZA: Primary | ICD-10-CM

## 2022-10-03 LAB
ALBUMIN SERPL BCG-MCNC: 3.8 G/DL (ref 3.5–5.2)
ALP SERPL-CCNC: 106 U/L (ref 40–129)
ALT SERPL W P-5'-P-CCNC: 15 U/L (ref 10–50)
ANION GAP SERPL CALCULATED.3IONS-SCNC: 10 MMOL/L (ref 7–15)
AST SERPL W P-5'-P-CCNC: 21 U/L (ref 10–50)
BASOPHILS # BLD AUTO: 0 10E3/UL (ref 0–0.2)
BASOPHILS NFR BLD AUTO: 0 %
BILIRUB SERPL-MCNC: 0.2 MG/DL
BUN SERPL-MCNC: 60 MG/DL (ref 8–23)
CALCIUM SERPL-MCNC: 9.6 MG/DL (ref 8.8–10.2)
CEA SERPL-MCNC: 7.1 NG/ML
CHLORIDE SERPL-SCNC: 107 MMOL/L (ref 98–107)
CREAT SERPL-MCNC: 1.58 MG/DL (ref 0.67–1.17)
DEPRECATED HCO3 PLAS-SCNC: 23 MMOL/L (ref 22–29)
EOSINOPHIL # BLD AUTO: 0.2 10E3/UL (ref 0–0.7)
EOSINOPHIL NFR BLD AUTO: 3 %
ERYTHROCYTE [DISTWIDTH] IN BLOOD BY AUTOMATED COUNT: 14.9 % (ref 10–15)
GFR SERPL CREATININE-BSD FRML MDRD: 42 ML/MIN/1.73M2
GLUCOSE SERPL-MCNC: 117 MG/DL (ref 70–99)
HCT VFR BLD AUTO: 32.9 % (ref 40–53)
HGB BLD-MCNC: 10.8 G/DL (ref 13.3–17.7)
IMM GRANULOCYTES # BLD: 0 10E3/UL
IMM GRANULOCYTES NFR BLD: 0 %
INR PPP: 1.88 (ref 0.85–1.15)
LYMPHOCYTES # BLD AUTO: 0.6 10E3/UL (ref 0.8–5.3)
LYMPHOCYTES NFR BLD AUTO: 9 %
MCH RBC QN AUTO: 30.2 PG (ref 26.5–33)
MCHC RBC AUTO-ENTMCNC: 32.8 G/DL (ref 31.5–36.5)
MCV RBC AUTO: 92 FL (ref 78–100)
MONOCYTES # BLD AUTO: 0.7 10E3/UL (ref 0–1.3)
MONOCYTES NFR BLD AUTO: 10 %
NEUTROPHILS # BLD AUTO: 5.3 10E3/UL (ref 1.6–8.3)
NEUTROPHILS NFR BLD AUTO: 78 %
NRBC # BLD AUTO: 0 10E3/UL
NRBC BLD AUTO-RTO: 0 /100
PLATELET # BLD AUTO: 161 10E3/UL (ref 150–450)
POTASSIUM SERPL-SCNC: 4.8 MMOL/L (ref 3.4–5.3)
PROT SERPL-MCNC: 7.1 G/DL (ref 6.4–8.3)
RBC # BLD AUTO: 3.58 10E6/UL (ref 4.4–5.9)
SODIUM SERPL-SCNC: 140 MMOL/L (ref 136–145)
WBC # BLD AUTO: 6.9 10E3/UL (ref 4–11)

## 2022-10-03 PROCEDURE — 90662 IIV NO PRSV INCREASED AG IM: CPT | Performed by: NURSE PRACTITIONER

## 2022-10-03 PROCEDURE — G0463 HOSPITAL OUTPT CLINIC VISIT: HCPCS

## 2022-10-03 PROCEDURE — 250N000011 HC RX IP 250 OP 636: Performed by: NURSE PRACTITIONER

## 2022-10-03 PROCEDURE — 83921 ORGANIC ACID SINGLE QUANT: CPT | Performed by: NURSE PRACTITIONER

## 2022-10-03 PROCEDURE — 99213 OFFICE O/P EST LOW 20 MIN: CPT | Performed by: NURSE PRACTITIONER

## 2022-10-03 PROCEDURE — 85610 PROTHROMBIN TIME: CPT | Performed by: NURSE PRACTITIONER

## 2022-10-03 PROCEDURE — 80053 COMPREHEN METABOLIC PANEL: CPT | Performed by: NURSE PRACTITIONER

## 2022-10-03 PROCEDURE — 91312 HC RX IP 250 OP 636: CPT | Performed by: NURSE PRACTITIONER

## 2022-10-03 PROCEDURE — 82378 CARCINOEMBRYONIC ANTIGEN: CPT | Performed by: NURSE PRACTITIONER

## 2022-10-03 PROCEDURE — 0124A HC ADMIN COVID VAC PFIZER 12+ BIVAL ADDITIONAL: CPT | Performed by: NURSE PRACTITIONER

## 2022-10-03 PROCEDURE — G0008 ADMIN INFLUENZA VIRUS VAC: HCPCS | Performed by: NURSE PRACTITIONER

## 2022-10-03 PROCEDURE — 36415 COLL VENOUS BLD VENIPUNCTURE: CPT | Performed by: NURSE PRACTITIONER

## 2022-10-03 PROCEDURE — 85025 COMPLETE CBC W/AUTO DIFF WBC: CPT | Performed by: NURSE PRACTITIONER

## 2022-10-03 RX ADMIN — BNT162B2 ORIGINAL AND OMICRON BA.4/BA.5 30 MCG: .1125; .1125 INJECTION, SUSPENSION INTRAMUSCULAR at 15:46

## 2022-10-03 RX ADMIN — INFLUENZA A VIRUS A/VICTORIA/2570/2019 IVR-215 (H1N1) ANTIGEN (FORMALDEHYDE INACTIVATED), INFLUENZA A VIRUS A/DARWIN/9/2021 SAN-010 (H3N2) ANTIGEN (FORMALDEHYDE INACTIVATED), INFLUENZA B VIRUS B/PHUKET/3073/2013 ANTIGEN (FORMALDEHYDE INACTIVATED), AND INFLUENZA B VIRUS B/MICHIGAN/01/2021 ANTIGEN (FORMALDEHYDE INACTIVATED) 0.7 ML: 60; 60; 60; 60 INJECTION, SUSPENSION INTRAMUSCULAR at 15:35

## 2022-10-03 ASSESSMENT — PAIN SCALES - GENERAL: PAINLEVEL: NO PAIN (0)

## 2022-10-03 NOTE — NURSING NOTE
Influenza vaccine given to patient in Left Deltoid . Patient tolerated injection without any incidents.     Has the patient received the information for the injectable influenza vaccine? YES    Omero Dale CMA ([facility]) October 3, 2022 3:54 PM

## 2022-10-03 NOTE — NURSING NOTE
Chief Complaint   Patient presents with     Oncology Clinic Visit     Colon Ca     Blood Draw     Labs drawn by RN via , vitals taken.     Labs collected from venipuncture by RN. Vitals taken. Checked in for appointment(s).    Melissa Frank RN

## 2022-10-03 NOTE — PROGRESS NOTES
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 More recently he was found to have a new cancer in his transverse colon for which he underwent resection in February 2019.  His tumor proved to be a stage II. Adjuvant therapy was not recommended in light of his advanced age and co morbidities. He has been followed every 6 months for CEA and CT's to evaluate for recurrence.     Interval history: Sha is here with his wife. He recently had a fall and developed some knee pain. This was managed with ice and rest with some improvement. Has neuropathy and foot drop and requires orthotics. Has had some difficulty getting the right fit for those. Bowels are irregular at times, but seem to have improved with cutting back on the amount of fiber he is taking daily. No abdominal pain, N/V. Appetite is stable, Eats 3 meals/day. Is able to go out of the house for some social events if his bowels are stable. No chest pain, sob ,palpitation, dizziness. No bleeding, bruising ,edema.    Past Medical History:   Diagnosis Date     Advanced open-angle glaucoma      Atrial fibrillation (H)      CKD (chronic kidney disease), stage III (H) 2005     Colon cancer (H)     Stage II-B colon cancer     Coronary artery disease     s/p CABG x 2, JEREMY x 2     Diverticulitis      Hyperlipidemia      Hypertension      Ischemic cardiomyopathy      MGUS (monoclonal gammopathy of unknown significance)      Nonsenile cataract     BE     Osteoporosis     left hip fracture     Polymorphic ventricular tachycardia      Polymyalgia rheumatica (H)      PVD (posterior vitreous detachment), both eyes 2005     S/P ablation of atrial flutter 6/20/14    CTI     Stented coronary artery      SVT (supraventricular tachycardia) (H)     PPM/AICD for NSVT     Upper leg DVT (deep venous thromboembolism), chronic (H)     Left     Weight loss,  unintentional 2017    15 lb in 4 months         Current Outpatient Medications   Medication Sig Dispense Refill     alendronate (FOSAMAX) 70 MG tablet Take 1 tablet (70 mg) by mouth every 7 days Take with a full glass of water and do not eat or lay down for 30 minutes 12 tablet 3     ARIPiprazole (ABILIFY) 2 MG tablet Take 2 mg by mouth daily       calcium carbonate 500 mg, elemental, (OSCAL;OYSTER SHELL CALCIUM) 500 MG tablet Take 1 tablet (500 mg) by mouth 2 times daily 180 tablet 3     cholecalciferol 1000 units TABS Take 1,000 Units by mouth daily        COMPRESSION STOCKINGS 1 each daily 1 each 4     cyanocobalamin (VITAMIN B-12) 1000 MCG tablet Take 4 daily 400 tablet 3     ferrous sulfate (FE TABS) 325 (65 Fe) MG EC tablet Take 325 mg by mouth daily       ketoconazole (NIZORAL) 2 % external cream Apply to affected areas on chest and feet twice daily as needed 60 g 6     ketoconazole (NIZORAL) 2 % external cream Apply to the feet 2 times a day until rash clears then 3-4 times a week for maintenance 60 g 11     ketoconazole (NIZORAL) 2 % external shampoo Apply to scalp daily as needed until flaking resolves 120 mL 1     metoprolol tartrate (LOPRESSOR) 25 MG tablet Take 1 tablet (25 mg) by mouth 2 times daily 180 tablet 3     mirtazapine (REMERON) 15 MG tablet Take 15 mg by mouth At Bedtime.       Multiple Vitamins-Minerals (MULTIVITAMIN ADULT PO)        sertraline (ZOLOFT) 100 MG tablet Take 150 mg by mouth every evening        tamsulosin (FLOMAX) 0.4 MG capsule TAKE 2 CAPSULES (0.8 MG) BY MOUTH DAILY 180 capsule 2     tiZANidine (ZANAFLEX) 2 MG tablet Take 1-2 tablets (2-4 mg) by mouth 3 times daily as needed for muscle spasms 30 tablet 0     warfarin ANTICOAGULANT (JANTOVEN ANTICOAGULANT) 5 MG tablet TAKE ONE TABLET BY MOUTH DAILY OR AS DIRECTED BY THE COUMADIN CLINIC 90 tablet 1          Allergies   Allergen Reactions     Latex Rash     Rash         Exam: alert, appears chronically ill. Sitting in a  wheelchair. Lungs: CTA. Heart:RRR. Abdomen: soft, nontender, BS active. Extremities: warm, no edema. Wearing orthotic foot braces bilaterally. Blood pressure 115/59, pulse 71, temperature 98  F (36.7  C), resp. rate 16, SpO2 96 %.  Wt Readings from Last 4 Encounters:   06/06/22 70.3 kg (155 lb)   11/03/21 70.5 kg (155 lb 8 oz)   09/21/21 71.3 kg (157 lb 3.2 oz)   05/10/21 70.3 kg (155 lb)         Labs:    Latest Reference Range & Units 10/03/22 14:53   Sodium 136 - 145 mmol/L 140   Potassium 3.4 - 5.3 mmol/L 4.8   Chloride 98 - 107 mmol/L 107   Carbon Dioxide (CO2) 22 - 29 mmol/L 23   Urea Nitrogen 8.0 - 23.0 mg/dL 60.0 (H)   Creatinine 0.67 - 1.17 mg/dL 1.58 (H)   GFR Estimate >60 mL/min/1.73m2 42 (L)   Calcium 8.8 - 10.2 mg/dL 9.6   Anion Gap 7 - 15 mmol/L 10   Albumin 3.5 - 5.2 g/dL 3.8   Protein Total 6.4 - 8.3 g/dL 7.1   Alkaline Phosphatase 40 - 129 U/L 106   ALT 10 - 50 U/L 15   AST 10 - 50 U/L 21   Bilirubin Total <=1.2 mg/dL 0.2   Glucose 70 - 99 mg/dL 117 (H)   WBC 4.0 - 11.0 10e3/uL 6.9   Hemoglobin 13.3 - 17.7 g/dL 10.8 (L)   Hematocrit 40.0 - 53.0 % 32.9 (L)   Platelet Count 150 - 450 10e3/uL 161   RBC Count 4.40 - 5.90 10e6/uL 3.58 (L)   MCV 78 - 100 fL 92   MCH 26.5 - 33.0 pg 30.2   MCHC 31.5 - 36.5 g/dL 32.8   RDW 10.0 - 15.0 % 14.9   % Neutrophils % 78   % Lymphocytes % 9   % Monocytes % 10   % Eosinophils % 3   % Basophils % 0   Absolute Basophils 0.0 - 0.2 10e3/uL 0.0   Absolute Eosinophils 0.0 - 0.7 10e3/uL 0.2   Absolute Immature Granulocytes <=0.4 10e3/uL 0.0   Absolute Lymphocytes 0.8 - 5.3 10e3/uL 0.6 (L)   Absolute Monocytes 0.0 - 1.3 10e3/uL 0.7   % Immature Granulocytes % 0   Absolute Neutrophils 1.6 - 8.3 10e3/uL 5.3   Absolute NRBCs 10e3/uL 0.0   NRBCs per 100 WBC <1 /100 0   INR 0.85 - 1.15  1.88 (H)   (H): Data is abnormally high  (L): Data is abnormally low    CEA is pending      Impression/plan:     Resected colon cancer, 2 separate primaries, 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  -has had persistently elevated CEA without evidence of recurrence or new malignancy  -clinically stable. CEA is pending. If that is stable, we would plan to continue to monitor with labs again in 6 months. Will defer routine imaging until there is a new clinical concern or a significant rise in the CEA      Addendum: CEA is elevated at 7.1. The differences compared to the prior CEA is likely related to the new assay used for this test. No change to the plan. Continue to monitor in 6 months.    Influenza vaccine and COVID booster given today.

## 2022-10-03 NOTE — NURSING NOTE
"Oncology Rooming Note    October 3, 2022 3:03 PM   Noe Florence is a 87 year old male who presents for:    Chief Complaint   Patient presents with     Oncology Clinic Visit     Colon Ca     Blood Draw     Labs drawn by RN via , vitals taken.     Initial Vitals: /59   Pulse 71   Temp 98  F (36.7  C)   Resp 16   SpO2 96%  Estimated body mass index is 24.28 kg/m  as calculated from the following:    Height as of 9/19/22: 1.702 m (5' 7\").    Weight as of 6/6/22: 70.3 kg (155 lb). There is no height or weight on file to calculate BSA.  No Pain (0) Comment: Data Unavailable   No LMP for male patient.  Allergies reviewed: Yes  Medications reviewed: Yes    Medications: Medication refills not needed today.  Pharmacy name entered into [facility]:    [facility] [place], [place] - [address]  [facility] - [place], [place] - [address]    Clinical concerns:  Patient states there are no new concerns to discuss with provider.  Angie was not notified.        Sary Pearson CMA              "

## 2022-10-03 NOTE — LETTER
10/3/2022         RE: Noe Florence  [address] [facility] [address]        Dear Colleague,    Thank you for referring your patient, Noe Florence, to the [facility]. Please see a copy of my visit note below.    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 More recently he was found to have a new cancer in his transverse colon for which he underwent resection in February 2019.  His tumor proved to be a stage II. Adjuvant therapy was not recommended in light of his advanced age and co morbidities. He has been followed every 6 months for CEA and CT's to evaluate for recurrence.     Interval history: Sha is here with his wife. He recently had a fall and developed some knee pain. This was managed with ice and rest with some improvement. Has neuropathy and foot drop and requires orthotics. Has had some difficulty getting the right fit for those. Bowels are irregular at times, but seem to have improved with cutting back on the amount of fiber he is taking daily. No abdominal pain, N/V. Appetite is stable, Eats 3 meals/day. Is able to go out of the house for some social events if his bowels are stable. No chest pain, sob ,palpitation, dizziness. No bleeding, bruising ,edema.    Past Medical History:   Diagnosis Date     Advanced open-angle glaucoma      Atrial fibrillation (H)      CKD (chronic kidney disease), stage III (H) 2005     Colon cancer (H)     Stage II-B colon cancer     Coronary artery disease     s/p CABG x 2, JEREMY x 2     Diverticulitis      Hyperlipidemia      Hypertension      Ischemic cardiomyopathy      MGUS (monoclonal gammopathy of unknown significance)      Nonsenile cataract     BE     Osteoporosis     left hip fracture     Polymorphic ventricular tachycardia      Polymyalgia rheumatica (H)      PVD (posterior vitreous  detachment), both eyes 2005     S/P ablation of atrial flutter 6/20/14    CTI     Stented coronary artery      SVT (supraventricular tachycardia) (H)     PPM/AICD for NSVT     Upper leg DVT (deep venous thromboembolism), chronic (H)     Left     Weight loss, unintentional 2017    15 lb in 4 months         Current Outpatient Medications   Medication Sig Dispense Refill     alendronate (FOSAMAX) 70 MG tablet Take 1 tablet (70 mg) by mouth every 7 days Take with a full glass of water and do not eat or lay down for 30 minutes 12 tablet 3     ARIPiprazole (ABILIFY) 2 MG tablet Take 2 mg by mouth daily       calcium carbonate 500 mg, elemental, (OSCAL;OYSTER SHELL CALCIUM) 500 MG tablet Take 1 tablet (500 mg) by mouth 2 times daily 180 tablet 3     cholecalciferol 1000 units TABS Take 1,000 Units by mouth daily        COMPRESSION STOCKINGS 1 each daily 1 each 4     cyanocobalamin (VITAMIN B-12) 1000 MCG tablet Take 4 daily 400 tablet 3     ferrous sulfate (FE TABS) 325 (65 Fe) MG EC tablet Take 325 mg by mouth daily       ketoconazole (NIZORAL) 2 % external cream Apply to affected areas on chest and feet twice daily as needed 60 g 6     ketoconazole (NIZORAL) 2 % external cream Apply to the feet 2 times a day until rash clears then 3-4 times a week for maintenance 60 g 11     ketoconazole (NIZORAL) 2 % external shampoo Apply to scalp daily as needed until flaking resolves 120 mL 1     metoprolol tartrate (LOPRESSOR) 25 MG tablet Take 1 tablet (25 mg) by mouth 2 times daily 180 tablet 3     mirtazapine (REMERON) 15 MG tablet Take 15 mg by mouth At Bedtime.       Multiple Vitamins-Minerals (MULTIVITAMIN ADULT PO)        sertraline (ZOLOFT) 100 MG tablet Take 150 mg by mouth every evening        tamsulosin (FLOMAX) 0.4 MG capsule TAKE 2 CAPSULES (0.8 MG) BY MOUTH DAILY 180 capsule 2     tiZANidine (ZANAFLEX) 2 MG tablet Take 1-2 tablets (2-4 mg) by mouth 3 times daily as needed for muscle spasms 30 tablet 0     warfarin  ANTICOAGULANT (JANTOVEN ANTICOAGULANT) 5 MG tablet TAKE ONE TABLET BY MOUTH DAILY OR AS DIRECTED BY THE COUMADIN CLINIC 90 tablet 1          Allergies   Allergen Reactions     Latex Rash     Rash         Exam: alert, appears chronically ill. Sitting in a wheelchair. Lungs: CTA. Heart:RRR. Abdomen: soft, nontender, BS active. Extremities: warm, no edema. Wearing orthotic foot braces bilaterally. Blood pressure 115/59, pulse 71, temperature 98  F (36.7  C), resp. rate 16, SpO2 96 %.  Wt Readings from Last 4 Encounters:   06/06/22 70.3 kg (155 lb)   11/03/21 70.5 kg (155 lb 8 oz)   09/21/21 71.3 kg (157 lb 3.2 oz)   05/10/21 70.3 kg (155 lb)         Labs:    Latest Reference Range & Units 10/03/22 14:53   Sodium 136 - 145 mmol/L 140   Potassium 3.4 - 5.3 mmol/L 4.8   Chloride 98 - 107 mmol/L 107   Carbon Dioxide (CO2) 22 - 29 mmol/L 23   Urea Nitrogen 8.0 - 23.0 mg/dL 60.0 (H)   Creatinine 0.67 - 1.17 mg/dL 1.58 (H)   GFR Estimate >60 mL/min/1.73m2 42 (L)   Calcium 8.8 - 10.2 mg/dL 9.6   Anion Gap 7 - 15 mmol/L 10   Albumin 3.5 - 5.2 g/dL 3.8   Protein Total 6.4 - 8.3 g/dL 7.1   Alkaline Phosphatase 40 - 129 U/L 106   ALT 10 - 50 U/L 15   AST 10 - 50 U/L 21   Bilirubin Total <=1.2 mg/dL 0.2   Glucose 70 - 99 mg/dL 117 (H)   WBC 4.0 - 11.0 10e3/uL 6.9   Hemoglobin 13.3 - 17.7 g/dL 10.8 (L)   Hematocrit 40.0 - 53.0 % 32.9 (L)   Platelet Count 150 - 450 10e3/uL 161   RBC Count 4.40 - 5.90 10e6/uL 3.58 (L)   MCV 78 - 100 fL 92   MCH 26.5 - 33.0 pg 30.2   MCHC 31.5 - 36.5 g/dL 32.8   RDW 10.0 - 15.0 % 14.9   % Neutrophils % 78   % Lymphocytes % 9   % Monocytes % 10   % Eosinophils % 3   % Basophils % 0   Absolute Basophils 0.0 - 0.2 10e3/uL 0.0   Absolute Eosinophils 0.0 - 0.7 10e3/uL 0.2   Absolute Immature Granulocytes <=0.4 10e3/uL 0.0   Absolute Lymphocytes 0.8 - 5.3 10e3/uL 0.6 (L)   Absolute Monocytes 0.0 - 1.3 10e3/uL 0.7   % Immature Granulocytes % 0   Absolute Neutrophils 1.6 - 8.3 10e3/uL 5.3   Absolute NRBCs  10e3/uL 0.0   NRBCs per 100 WBC <1 /100 0   INR 0.85 - 1.15  1.88 (H)   (H): Data is abnormally high  (L): Data is abnormally low    CEA is pending      Impression/plan:     Resected colon cancer, 2 separate primaries, 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  -has had persistently elevated CEA without evidence of recurrence or new malignancy  -clinically stable. CEA is pending. If that is stable, we would plan to continue to monitor with labs again in 6 months. Will defer routine imaging until there is a new clinical concern or a significant rise in the CEA      Addendum: CEA is elevated at 7.1. The differences compared to the prior CEA is likely related to the new assay used for this test. No change to the plan. Continue to monitor in 6 months.    Influenza vaccine and COVID booster given today.          Again, thank you for allowing me to participate in the care of your patient.      Sincerely,    MARIA LUISA Montes CNP

## 2022-10-03 NOTE — NURSING NOTE
Pfizer vaccine for COVID-19 administered in right deltoid. Patient tolerated well and was discharged after 15 minute observation period. VIS provided. Consent form scanned to chart.    Omero Dale on 10/3/2022 at 3:53 PM

## 2022-10-03 NOTE — PROGRESS NOTES
ANTICOAGULATION MANAGEMENT     Noe Florence 87 year old male is on warfarin with therapeutic INR result. (Goal INR 1.5-2.5)    Recent labs: (last 7 days)     10/03/22  1453   INR 1.88*       ASSESSMENT       Source(s): Chart Review and Patient/Caregiver Call       Warfarin doses taken: Warfarin taken as instructed    Diet: No new diet changes identified    New illness, injury, or hospitalization: No    Medication/supplement changes: None noted    Signs or symptoms of bleeding or clotting: No    Previous INR: Therapeutic last 2(+) visits    Additional findings: None       PLAN     Recommended plan for no diet, medication or health factor changes affecting INR     Dosing Instructions: Continue your current warfarin dose with next INR in 3 weeks       Summary  As of 10/3/2022    Full warfarin instructions:  5 mg every day   Next INR check:  10/24/2022             Telephone call with  Rica who verbalizes understanding and agrees to plan    Patient offered & declined to schedule next visit    Education provided: Goal range and significance of current result    Plan made per [facility] anticoagulation protocol    Bhavana Bess RN  Anticoagulation Clinic  10/3/2022    _______________________________________________________________________     Anticoagulation Episode Summary     Current INR goal:  Other - see comment   TTR:  53.0 % (1 y)   Target end date:  Indefinite   Send INR reminders to:  [facility]    Indications    Atrial fibrillation (H) [I48.91] [I48.91]  Long-term (current) use of anticoagulants [Z79.01] [Z79.01]  Deep vein thrombosis (DVT) (H) [I82.409]  Atrial fibrillation  unspecified type (H) [I48.91]           Comments:  Hx of Falls/Bleed  Goal range: 1.5-2.5         Anticoagulation Care Providers     Provider Role Specialty Phone number    Frida Desai MD Referring Cardiovascular Disease [phone]    Roberto Sarmiento MD Referring Internal Medicine [phone]

## 2022-10-14 LAB — METHYLMALONATE SERPL-SCNC: 0.36 UMOL/L (ref 0–0.4)

## 2022-10-28 ENCOUNTER — ANTICOAGULATION THERAPY VISIT (OUTPATIENT)
Dept: ANTICOAGULATION | Facility: CLINIC | Age: 87
End: 2022-10-28

## 2022-10-28 ENCOUNTER — LAB (OUTPATIENT)
Dept: LAB | Facility: CLINIC | Age: 87
End: 2022-10-28
Payer: COMMERCIAL

## 2022-10-28 DIAGNOSIS — I82.409 DEEP VEIN THROMBOSIS (DVT) (H): ICD-10-CM

## 2022-10-28 DIAGNOSIS — Z79.01 LONG TERM CURRENT USE OF ANTICOAGULANT THERAPY: ICD-10-CM

## 2022-10-28 DIAGNOSIS — R79.89 HIGH SERUM METHYLMALONATE: ICD-10-CM

## 2022-10-28 DIAGNOSIS — I48.91 ATRIAL FIBRILLATION, UNSPECIFIED TYPE (H): Primary | ICD-10-CM

## 2022-10-28 DIAGNOSIS — C18.9 MALIGNANT NEOPLASM OF COLON, UNSPECIFIED PART OF COLON (H): ICD-10-CM

## 2022-10-28 DIAGNOSIS — D64.9 ANEMIA, UNSPECIFIED TYPE: ICD-10-CM

## 2022-10-28 DIAGNOSIS — R97.8 ELEVATED TUMOR MARKERS: ICD-10-CM

## 2022-10-28 LAB
ALBUMIN SERPL BCG-MCNC: 3.9 G/DL (ref 3.5–5.2)
ALP SERPL-CCNC: 87 U/L (ref 40–129)
ALT SERPL W P-5'-P-CCNC: 7 U/L (ref 10–50)
ANION GAP SERPL CALCULATED.3IONS-SCNC: 10 MMOL/L (ref 7–15)
AST SERPL W P-5'-P-CCNC: 20 U/L (ref 10–50)
BASOPHILS # BLD AUTO: 0 10E3/UL (ref 0–0.2)
BASOPHILS NFR BLD AUTO: 0 %
BILIRUB SERPL-MCNC: 0.2 MG/DL
BUN SERPL-MCNC: 66.6 MG/DL (ref 8–23)
CALCIUM SERPL-MCNC: 9.8 MG/DL (ref 8.8–10.2)
CEA SERPL-MCNC: 7.6 NG/ML
CHLORIDE SERPL-SCNC: 114 MMOL/L (ref 98–107)
CREAT SERPL-MCNC: 1.73 MG/DL (ref 0.67–1.17)
DEPRECATED HCO3 PLAS-SCNC: 19 MMOL/L (ref 22–29)
EOSINOPHIL # BLD AUTO: 0.1 10E3/UL (ref 0–0.7)
EOSINOPHIL NFR BLD AUTO: 3 %
ERYTHROCYTE [DISTWIDTH] IN BLOOD BY AUTOMATED COUNT: 15.9 % (ref 10–15)
GFR SERPL CREATININE-BSD FRML MDRD: 38 ML/MIN/1.73M2
GLUCOSE SERPL-MCNC: 139 MG/DL (ref 70–99)
HCT VFR BLD AUTO: 33.2 % (ref 40–53)
HGB BLD-MCNC: 10.5 G/DL (ref 13.3–17.7)
IMM GRANULOCYTES # BLD: 0 10E3/UL
IMM GRANULOCYTES NFR BLD: 0 %
INR PPP: 2.93 (ref 0.85–1.15)
LYMPHOCYTES # BLD AUTO: 0.5 10E3/UL (ref 0.8–5.3)
LYMPHOCYTES NFR BLD AUTO: 9 %
MCH RBC QN AUTO: 29.8 PG (ref 26.5–33)
MCHC RBC AUTO-ENTMCNC: 31.6 G/DL (ref 31.5–36.5)
MCV RBC AUTO: 94 FL (ref 78–100)
MONOCYTES # BLD AUTO: 0.4 10E3/UL (ref 0–1.3)
MONOCYTES NFR BLD AUTO: 7 %
NEUTROPHILS # BLD AUTO: 4.4 10E3/UL (ref 1.6–8.3)
NEUTROPHILS NFR BLD AUTO: 81 %
NRBC # BLD AUTO: 0 10E3/UL
NRBC BLD AUTO-RTO: 0 /100
PLATELET # BLD AUTO: 169 10E3/UL (ref 150–450)
POTASSIUM SERPL-SCNC: 5.1 MMOL/L (ref 3.4–5.3)
PROT SERPL-MCNC: 7.2 G/DL (ref 6.4–8.3)
RBC # BLD AUTO: 3.52 10E6/UL (ref 4.4–5.9)
SODIUM SERPL-SCNC: 143 MMOL/L (ref 136–145)
WBC # BLD AUTO: 5.5 10E3/UL (ref 4–11)

## 2022-10-28 PROCEDURE — 99000 SPECIMEN HANDLING OFFICE-LAB: CPT | Performed by: PATHOLOGY

## 2022-10-28 PROCEDURE — 85610 PROTHROMBIN TIME: CPT | Performed by: PATHOLOGY

## 2022-10-28 PROCEDURE — 80053 COMPREHEN METABOLIC PANEL: CPT | Performed by: PATHOLOGY

## 2022-10-28 PROCEDURE — 36415 COLL VENOUS BLD VENIPUNCTURE: CPT | Performed by: PATHOLOGY

## 2022-10-28 PROCEDURE — 85025 COMPLETE CBC W/AUTO DIFF WBC: CPT | Performed by: PATHOLOGY

## 2022-10-28 PROCEDURE — 82378 CARCINOEMBRYONIC ANTIGEN: CPT | Mod: 90 | Performed by: PATHOLOGY

## 2022-10-28 NOTE — PROGRESS NOTES
ANTICOAGULATION MANAGEMENT     Noe Florence 87 year old male is on warfarin with supratherapeutic INR result. (Goal INR Other 1.5-2.5    Recent labs: (last 7 days)     10/28/22  1436   INR 2.93*       ASSESSMENT       Source(s): Chart Review and Patient/Caregiver Call       Warfarin doses taken: Warfarin taken as instructed    Diet: Decreased greens/vitamin K in diet; plans to resume previous intake    New illness, injury, or hospitalization: No    Medication/supplement changes: None noted    Signs or symptoms of bleeding or clotting: No    Previous INR: Therapeutic last 2(+) visits    Additional findings: None       PLAN     Recommended plan for temporary change(s) affecting INR     Dosing Instructions: partial hold then continue your current warfarin dose with next INR in 2 weeks       Summary  As of 10/28/2022    Full warfarin instructions:  10/28: 2.5 mg; Otherwise 5 mg every day; Starting 10/28/2022   Next INR check:  11/11/2022             Telephone call with  Rica who verbalizes understanding and agrees to plan and who agrees to plan and repeated back plan correctly    Lab visit scheduled    Education provided:     Taking warfarin: Importance of taking warfarin as instructed    Goal range and lab monitoring: goal range and significance of current result and Importance of therapeutic range    Plan made per [facility] anticoagulation protocol    Roya Manning RN  Anticoagulation Clinic  10/28/2022    _______________________________________________________________________     Anticoagulation Episode Summary     Current INR goal:  Other - see comment   TTR:  58.9 % (1 y)   Target end date:  Indefinite   Send INR reminders to:  [facility]    Indications    Atrial fibrillation (H) [I48.91] [I48.91]  Long-term (current) use of anticoagulants [Z79.01] [Z79.01]  Deep vein thrombosis (DVT) (H) [I82.409]  Atrial fibrillation  unspecified type (H) [I48.91]           Comments:  Hx of Falls/Bleed  Goal range:  1.5-2.5         Anticoagulation Care Providers     Provider Role Specialty Phone number    Frida Desai MD Referring Cardiovascular Disease [phone]    Roberto Sarmiento MD Referring Internal Medicine [phone]

## 2022-11-02 ENCOUNTER — ANCILLARY PROCEDURE (OUTPATIENT)
Dept: CARDIOLOGY | Facility: CLINIC | Age: 87
End: 2022-11-02
Attending: INTERNAL MEDICINE
Payer: COMMERCIAL

## 2022-11-02 DIAGNOSIS — Z95.810 ICD (IMPLANTABLE CARDIOVERTER-DEFIBRILLATOR) IN PLACE: ICD-10-CM

## 2022-11-02 DIAGNOSIS — I47.20 VENTRICULAR TACHYARRHYTHMIA (H): ICD-10-CM

## 2022-11-02 DIAGNOSIS — I48.0 PAROXYSMAL ATRIAL FIBRILLATION (H): ICD-10-CM

## 2022-11-02 PROCEDURE — 93296 REM INTERROG EVL PM/IDS: CPT

## 2022-11-02 PROCEDURE — 93295 DEV INTERROG REMOTE 1/2/MLT: CPT | Performed by: INTERNAL MEDICINE

## 2022-11-05 LAB
MDC_IDC_EPISODE_DTM: NORMAL
MDC_IDC_EPISODE_DTM: NORMAL
MDC_IDC_EPISODE_DURATION: 3432 S
MDC_IDC_EPISODE_DURATION: 44 S
MDC_IDC_EPISODE_ID: 78
MDC_IDC_EPISODE_ID: 79
MDC_IDC_EPISODE_TYPE: NORMAL
MDC_IDC_EPISODE_TYPE: NORMAL
MDC_IDC_LEAD_IMPLANT_DT: NORMAL
MDC_IDC_LEAD_IMPLANT_DT: NORMAL
MDC_IDC_LEAD_LOCATION: NORMAL
MDC_IDC_LEAD_LOCATION: NORMAL
MDC_IDC_LEAD_LOCATION_DETAIL_1: NORMAL
MDC_IDC_LEAD_LOCATION_DETAIL_1: NORMAL
MDC_IDC_LEAD_MFG: NORMAL
MDC_IDC_LEAD_MFG: NORMAL
MDC_IDC_LEAD_MODEL: NORMAL
MDC_IDC_LEAD_MODEL: NORMAL
MDC_IDC_LEAD_POLARITY_TYPE: NORMAL
MDC_IDC_LEAD_POLARITY_TYPE: NORMAL
MDC_IDC_LEAD_SERIAL: NORMAL
MDC_IDC_LEAD_SERIAL: NORMAL
MDC_IDC_MSMT_BATTERY_DTM: NORMAL
MDC_IDC_MSMT_BATTERY_REMAINING_LONGEVITY: 62 MO
MDC_IDC_MSMT_BATTERY_RRT_TRIGGER: 2.73
MDC_IDC_MSMT_BATTERY_STATUS: NORMAL
MDC_IDC_MSMT_BATTERY_VOLTAGE: 2.98 V
MDC_IDC_MSMT_CAP_CHARGE_DTM: NORMAL
MDC_IDC_MSMT_CAP_CHARGE_ENERGY: 18 J
MDC_IDC_MSMT_CAP_CHARGE_TIME: 3.86
MDC_IDC_MSMT_CAP_CHARGE_TYPE: NORMAL
MDC_IDC_MSMT_LEADCHNL_RA_IMPEDANCE_VALUE: 456 OHM
MDC_IDC_MSMT_LEADCHNL_RA_PACING_THRESHOLD_AMPLITUDE: 0.62 V
MDC_IDC_MSMT_LEADCHNL_RA_PACING_THRESHOLD_PULSEWIDTH: 0.4 MS
MDC_IDC_MSMT_LEADCHNL_RA_SENSING_INTR_AMPL: 0.75 MV
MDC_IDC_MSMT_LEADCHNL_RA_SENSING_INTR_AMPL: 0.75 MV
MDC_IDC_MSMT_LEADCHNL_RV_IMPEDANCE_VALUE: 304 OHM
MDC_IDC_MSMT_LEADCHNL_RV_IMPEDANCE_VALUE: 342 OHM
MDC_IDC_MSMT_LEADCHNL_RV_PACING_THRESHOLD_AMPLITUDE: 0.62 V
MDC_IDC_MSMT_LEADCHNL_RV_PACING_THRESHOLD_PULSEWIDTH: 0.4 MS
MDC_IDC_MSMT_LEADCHNL_RV_SENSING_INTR_AMPL: 5.88 MV
MDC_IDC_MSMT_LEADCHNL_RV_SENSING_INTR_AMPL: 5.88 MV
MDC_IDC_PG_IMPLANT_DTM: NORMAL
MDC_IDC_PG_MFG: NORMAL
MDC_IDC_PG_MODEL: NORMAL
MDC_IDC_PG_SERIAL: NORMAL
MDC_IDC_PG_TYPE: NORMAL
MDC_IDC_SESS_CLINIC_NAME: NORMAL
MDC_IDC_SESS_DTM: NORMAL
MDC_IDC_SESS_TYPE: NORMAL
MDC_IDC_SET_BRADY_AT_MODE_SWITCH_RATE: 171 {BEATS}/MIN
MDC_IDC_SET_BRADY_HYSTRATE: NORMAL
MDC_IDC_SET_BRADY_LOWRATE: 60 {BEATS}/MIN
MDC_IDC_SET_BRADY_MAX_SENSOR_RATE: 130 {BEATS}/MIN
MDC_IDC_SET_BRADY_MAX_TRACKING_RATE: 130 {BEATS}/MIN
MDC_IDC_SET_BRADY_MODE: NORMAL
MDC_IDC_SET_BRADY_PAV_DELAY_LOW: 180 MS
MDC_IDC_SET_BRADY_SAV_DELAY_LOW: 150 MS
MDC_IDC_SET_LEADCHNL_RA_PACING_AMPLITUDE: 1.5 V
MDC_IDC_SET_LEADCHNL_RA_PACING_ANODE_ELECTRODE_1: NORMAL
MDC_IDC_SET_LEADCHNL_RA_PACING_ANODE_LOCATION_1: NORMAL
MDC_IDC_SET_LEADCHNL_RA_PACING_CAPTURE_MODE: NORMAL
MDC_IDC_SET_LEADCHNL_RA_PACING_CATHODE_ELECTRODE_1: NORMAL
MDC_IDC_SET_LEADCHNL_RA_PACING_CATHODE_LOCATION_1: NORMAL
MDC_IDC_SET_LEADCHNL_RA_PACING_POLARITY: NORMAL
MDC_IDC_SET_LEADCHNL_RA_PACING_PULSEWIDTH: 0.4 MS
MDC_IDC_SET_LEADCHNL_RA_SENSING_ANODE_ELECTRODE_1: NORMAL
MDC_IDC_SET_LEADCHNL_RA_SENSING_ANODE_LOCATION_1: NORMAL
MDC_IDC_SET_LEADCHNL_RA_SENSING_CATHODE_ELECTRODE_1: NORMAL
MDC_IDC_SET_LEADCHNL_RA_SENSING_CATHODE_LOCATION_1: NORMAL
MDC_IDC_SET_LEADCHNL_RA_SENSING_POLARITY: NORMAL
MDC_IDC_SET_LEADCHNL_RA_SENSING_SENSITIVITY: 0.3 MV
MDC_IDC_SET_LEADCHNL_RV_PACING_AMPLITUDE: 2 V
MDC_IDC_SET_LEADCHNL_RV_PACING_ANODE_ELECTRODE_1: NORMAL
MDC_IDC_SET_LEADCHNL_RV_PACING_ANODE_LOCATION_1: NORMAL
MDC_IDC_SET_LEADCHNL_RV_PACING_CAPTURE_MODE: NORMAL
MDC_IDC_SET_LEADCHNL_RV_PACING_CATHODE_ELECTRODE_1: NORMAL
MDC_IDC_SET_LEADCHNL_RV_PACING_CATHODE_LOCATION_1: NORMAL
MDC_IDC_SET_LEADCHNL_RV_PACING_POLARITY: NORMAL
MDC_IDC_SET_LEADCHNL_RV_PACING_PULSEWIDTH: 0.4 MS
MDC_IDC_SET_LEADCHNL_RV_SENSING_ANODE_ELECTRODE_1: NORMAL
MDC_IDC_SET_LEADCHNL_RV_SENSING_ANODE_LOCATION_1: NORMAL
MDC_IDC_SET_LEADCHNL_RV_SENSING_CATHODE_ELECTRODE_1: NORMAL
MDC_IDC_SET_LEADCHNL_RV_SENSING_CATHODE_LOCATION_1: NORMAL
MDC_IDC_SET_LEADCHNL_RV_SENSING_POLARITY: NORMAL
MDC_IDC_SET_LEADCHNL_RV_SENSING_SENSITIVITY: 0.45 MV
MDC_IDC_SET_ZONE_DETECTION_BEATS_DENOMINATOR: 24 {BEATS}
MDC_IDC_SET_ZONE_DETECTION_BEATS_NUMERATOR: 18 {BEATS}
MDC_IDC_SET_ZONE_DETECTION_INTERVAL: 300 MS
MDC_IDC_SET_ZONE_DETECTION_INTERVAL: 320 MS
MDC_IDC_SET_ZONE_DETECTION_INTERVAL: 350 MS
MDC_IDC_SET_ZONE_DETECTION_INTERVAL: 430 MS
MDC_IDC_SET_ZONE_DETECTION_INTERVAL: NORMAL
MDC_IDC_SET_ZONE_TYPE: NORMAL
MDC_IDC_STAT_AT_BURDEN_PERCENT: 0 %
MDC_IDC_STAT_AT_DTM_END: NORMAL
MDC_IDC_STAT_AT_DTM_START: NORMAL
MDC_IDC_STAT_BRADY_AP_VP_PERCENT: 36.73 %
MDC_IDC_STAT_BRADY_AP_VS_PERCENT: 31.53 %
MDC_IDC_STAT_BRADY_AS_VP_PERCENT: 12.17 %
MDC_IDC_STAT_BRADY_AS_VS_PERCENT: 19.56 %
MDC_IDC_STAT_BRADY_DTM_END: NORMAL
MDC_IDC_STAT_BRADY_DTM_START: NORMAL
MDC_IDC_STAT_BRADY_RA_PERCENT_PACED: 65.91 %
MDC_IDC_STAT_BRADY_RV_PERCENT_PACED: 48.93 %
MDC_IDC_STAT_EPISODE_RECENT_COUNT: 0
MDC_IDC_STAT_EPISODE_RECENT_COUNT: 2
MDC_IDC_STAT_EPISODE_RECENT_COUNT_DTM_END: NORMAL
MDC_IDC_STAT_EPISODE_RECENT_COUNT_DTM_START: NORMAL
MDC_IDC_STAT_EPISODE_TOTAL_COUNT: 0
MDC_IDC_STAT_EPISODE_TOTAL_COUNT: 5
MDC_IDC_STAT_EPISODE_TOTAL_COUNT: 74
MDC_IDC_STAT_EPISODE_TOTAL_COUNT_DTM_END: NORMAL
MDC_IDC_STAT_EPISODE_TOTAL_COUNT_DTM_START: NORMAL
MDC_IDC_STAT_EPISODE_TYPE: NORMAL
MDC_IDC_STAT_TACHYTHERAPY_ATP_DELIVERED_RECENT: 0
MDC_IDC_STAT_TACHYTHERAPY_ATP_DELIVERED_TOTAL: 0
MDC_IDC_STAT_TACHYTHERAPY_RECENT_DTM_END: NORMAL
MDC_IDC_STAT_TACHYTHERAPY_RECENT_DTM_START: NORMAL
MDC_IDC_STAT_TACHYTHERAPY_SHOCKS_ABORTED_RECENT: 0
MDC_IDC_STAT_TACHYTHERAPY_SHOCKS_ABORTED_TOTAL: 0
MDC_IDC_STAT_TACHYTHERAPY_SHOCKS_DELIVERED_RECENT: 0
MDC_IDC_STAT_TACHYTHERAPY_SHOCKS_DELIVERED_TOTAL: 0
MDC_IDC_STAT_TACHYTHERAPY_TOTAL_DTM_END: NORMAL
MDC_IDC_STAT_TACHYTHERAPY_TOTAL_DTM_START: NORMAL

## 2022-11-09 ENCOUNTER — OFFICE VISIT (OUTPATIENT)
Dept: INTERNAL MEDICINE | Facility: CLINIC | Age: 87
End: 2022-11-09
Payer: COMMERCIAL

## 2022-11-09 VITALS
SYSTOLIC BLOOD PRESSURE: 115 MMHG | DIASTOLIC BLOOD PRESSURE: 72 MMHG | HEIGHT: 67 IN | HEART RATE: 61 BPM | OXYGEN SATURATION: 99 % | BODY MASS INDEX: 24.28 KG/M2

## 2022-11-09 DIAGNOSIS — R53.81 PHYSICAL DECONDITIONING: Primary | ICD-10-CM

## 2022-11-09 PROCEDURE — 99213 OFFICE O/P EST LOW 20 MIN: CPT | Performed by: INTERNAL MEDICINE

## 2022-11-09 NOTE — NURSING NOTE
Noe Florence is a 87 year old male that presents in clinic today for the following:     Chief Complaint   Patient presents with     Fall     Fall on Friday night. 3rd fall in 6 weeks. Fell on knees and was stuck     Consult     PT, rehab, or home care per patient's wife       The patient's allergies and medications were reviewed. The patient's vitals were obtained without incident. The patient does not have any other questions for the provider.     Guzman Tom, EMT at 1:45 PM on 11/9/2022.  Primary Care Clinic: [phone]

## 2022-11-09 NOTE — PROGRESS NOTES
"Noe was seen today with his wife for falls and consult regarding placement.    He is a very pleasant 87 year old male who has been falling more frequently.  No specific inciting event or triggering factor, other than overall weakness, global deconditioning paired with reduced mobility due to osteoarthritis.     Was admitted to [facility] before, for a short term rehab stay, and with more frequent PT, was able to transition back home eventually.  They are requesting this approach again.    His wife cannot lift him or assist in getting him up to ambulate.  They have a PCA at home four days a week, but even with this help, it has been too difficult to assist him with getting up to the bathroom and other ADLs.    He had home care in the past, but again, I do not feel this would be adequate given he needs assistance with all transfers and has limited mobility in terms of ADLs.    On exam  He is alert, NAD  /72 (BP Location: Right arm, Patient Position: Sitting, Cuff Size: Adult Regular)   Pulse 61   Ht 1.702 m (5' 7\")   SpO2 99%   BMI 24.28 kg/m     No further exam was done, as they arrived a bit late for the appointment.      A/P 87 year old male with falls, frequency increasing, and limited mobility, needs short term rehab stay at a minimum, versus skilled nursing home placement depending on what kind of progress he would make.    Physical deconditioning  -     Social Work Referral (Only to be ordered at: [facility] ONLY); Future    I spent 10 minutes with Bill and another 10 minutes on chart review and documentation same day    Bhavana Pulliam MD    History and physical is correct as of November 14, 2022   Roberto Sarmiento MD      "

## 2022-11-10 ENCOUNTER — PATIENT OUTREACH (OUTPATIENT)
Dept: CARE COORDINATION | Facility: CLINIC | Age: 87
End: 2022-11-10

## 2022-11-10 NOTE — PROGRESS NOTES
Social Work Intervention  [facility]    Data/Intervention:    Patient Name:  Noe Florence  /Age:  1935 (87 year old)    Visit Type: telephone  Referral Source: P[facility]  Reason for Referral:  TCU placement from home due to deconditioning, weakness, hx of falls.    Collaborated With:    -Patient's significant other, Rica  -[facility] Admissions, [phone]    Psychosocial Information/Concerns:  Sha is an 87 year old male who has been falling more frequently.  No specific inciting event or triggering factor, other than overall weakness, global deconditioning paired with reduced mobility due to osteoarthritis.      Was admitted to [facility] in 2019, for a short term rehab stay, and with more frequent PT, was able to transition back home eventually.  They are requesting this approach again.     His significant other cannot lift him or assist in getting him up to ambulate.  They have a PCA at home four days a week, but even with this help, it has been too difficult to assist him with getting up to the bathroom and other ADLs.    Intervention/Education/Resources Provided:   spoke with Patient's significant other, Rica, to confirm the desire to admit to [facility]. Rica understands this may not be an immediate placement as these take time from the community. Rica believes they can manage without having to be hospitalized.     Assessment/Plan:  SNF referral sent to [facility] for consideration.    Provided patient/family with contact information and availability.    [facility] Edwards [facility]  Clinical , Outpatient Specialty Clinics  Direct Phone: [phone]

## 2022-11-11 ENCOUNTER — LAB (OUTPATIENT)
Dept: LAB | Facility: CLINIC | Age: 87
End: 2022-11-11
Payer: COMMERCIAL

## 2022-11-11 ENCOUNTER — ANTICOAGULATION THERAPY VISIT (OUTPATIENT)
Dept: ANTICOAGULATION | Facility: CLINIC | Age: 87
End: 2022-11-11

## 2022-11-11 DIAGNOSIS — I82.409 DEEP VEIN THROMBOSIS (DVT) (H): ICD-10-CM

## 2022-11-11 DIAGNOSIS — I48.91 ATRIAL FIBRILLATION, UNSPECIFIED TYPE (H): Primary | ICD-10-CM

## 2022-11-11 DIAGNOSIS — I48.91 ATRIAL FIBRILLATION, UNSPECIFIED TYPE (H): ICD-10-CM

## 2022-11-11 DIAGNOSIS — Z79.01 LONG TERM CURRENT USE OF ANTICOAGULANT THERAPY: ICD-10-CM

## 2022-11-11 LAB — INR BLD: 6.9 (ref 0.9–1.1)

## 2022-11-11 PROCEDURE — 36416 COLLJ CAPILLARY BLOOD SPEC: CPT | Performed by: PATHOLOGY

## 2022-11-11 PROCEDURE — 85610 PROTHROMBIN TIME: CPT | Performed by: PATHOLOGY

## 2022-11-11 NOTE — PROGRESS NOTES
ANTICOAGULATION MANAGEMENT     Noe Florence 87 year old male is on warfarin with supratherapeutic INR result. (Goal INR 1.5-2.5)    Recent labs: (last 7 days)     11/11/22  1443   INR 6.9*       ASSESSMENT       Source(s): Patient/Caregiver Call       Warfarin doses taken: Warfarin taken as instructed    Diet: Decreased greens/vitamin K in diet; ongoing change and joe Strauss reports decreasing food intake due to patient falling and unable to walk to the bathroom. Patient's son comes over to help, but comes over after his sleep (works late shift).    New illness, injury, or hospitalization: Yes: recent fall last week    Medication/supplement changes: None noted    Signs or symptoms of bleeding or clotting: Yes: has bilateral bruising to arms.    Previous INR: Supratherapeutic    Additional findings: Last INR Rica reports messing up dosing, but then independently dosing to help the mistake. Updated dosing calendar with dosing reported.       PLAN     Recommended plan for ongoing change(s) affecting INR     Dosing Instructions: hold 3 doses then decrease your warfarin dose (21.4% change) with next INR in 3 days       Summary  As of 11/11/2022    Full warfarin instructions:  11/11: Hold; 11/12: Hold; 11/13: Hold; Otherwise 2.5 mg every Sun, Tue, Thu; 5 mg all other days; Starting 11/11/2022   Next INR check:  11/14/2022             Telephone call with  joe Strauss who verbalizes understanding and agrees to plan and who agrees to plan and repeated back plan correctly    Lab visit scheduled    Education provided:     Symptom monitoring: monitoring for bleeding signs and symptoms, when to seek medical attention/emergency care and if you hit your head or have a bad fall seek emergency care    Plan made with [facility] Pharmacist Lizett Zuniga, RN  Anticoagulation Clinic  11/11/2022    _______________________________________________________________________     Anticoagulation Episode Summary     Current INR  goal:  Other - see comment   TTR:  55.1 % (1 y)   Target end date:  Indefinite   Send INR reminders to:  [facility]    Indications    Atrial fibrillation (H) [I48.91] [I48.91]  Long-term (current) use of anticoagulants [Z79.01] [Z79.01]  Deep vein thrombosis (DVT) (H) [I82.409]  Atrial fibrillation  unspecified type (H) [I48.91]           Comments:  Hx of Falls/Bleed  Goal range: 1.5-2.5         Anticoagulation Care Providers     Provider Role Specialty Phone number    Frida Desai MD Referring Cardiovascular Disease [phone]    [facility] Healthcare[facility]to bal MD Referring Internal Medicine [phone]

## 2022-11-14 ENCOUNTER — ANTICOAGULATION THERAPY VISIT (OUTPATIENT)
Dept: ANTICOAGULATION | Facility: CLINIC | Age: 87
End: 2022-11-14

## 2022-11-14 ENCOUNTER — LAB (OUTPATIENT)
Dept: LAB | Facility: CLINIC | Age: 87
End: 2022-11-14
Payer: COMMERCIAL

## 2022-11-14 ENCOUNTER — PATIENT OUTREACH (OUTPATIENT)
Dept: CARE COORDINATION | Facility: CLINIC | Age: 87
End: 2022-11-14

## 2022-11-14 DIAGNOSIS — I48.91 ATRIAL FIBRILLATION, UNSPECIFIED TYPE (H): ICD-10-CM

## 2022-11-14 DIAGNOSIS — Z79.01 LONG TERM CURRENT USE OF ANTICOAGULANT THERAPY: ICD-10-CM

## 2022-11-14 DIAGNOSIS — I82.409 DEEP VEIN THROMBOSIS (DVT) (H): ICD-10-CM

## 2022-11-14 DIAGNOSIS — I48.91 ATRIAL FIBRILLATION, UNSPECIFIED TYPE (H): Primary | ICD-10-CM

## 2022-11-14 LAB — INR BLD: 1.9 (ref 0.9–1.1)

## 2022-11-14 PROCEDURE — 85610 PROTHROMBIN TIME: CPT | Performed by: PATHOLOGY

## 2022-11-14 PROCEDURE — 36416 COLLJ CAPILLARY BLOOD SPEC: CPT | Performed by: PATHOLOGY

## 2022-11-14 NOTE — PROGRESS NOTES
Social Work Follow-Up  [facility]    Data/Intervention:  Patient Name:  Noe Florence  /Age:  1935 (87 year old)    Reason for Follow-Up:  SNF placement to [facility]    Bill is an 87 year old male who has been falling more frequently.  Overall weakness, global deconditioning paired with reduced mobility due to osteoarthritis.     Collaborated With:    -Patient's significant other, Rica  -[facility] Admissions, [phone]    Intervention/Education/Resources Provided:   has been in communication with [facility] admissions. They are able to admit Patient. Per Patient's significant other, Rica, they can get a ride 11/15/2022 at 1330h to admit to SNF. Admission is confirmed for that time.     Pre-Admission Screening completed. [id number]    Assessment/Plan:  Patient to admit to [facility] TCU on 11/15/2022 at 1330h.  Previously provided patient/family with writer's contact information and availability.       Belinda Edwards [facility]  Clinical , Outpatient Specialty Clinics  Direct Phone: [phone]

## 2022-11-14 NOTE — PROGRESS NOTES
ANTICOAGULATION MANAGEMENT     Noe Florence 87 year old male is on warfarin with therapeutic INR result. (Goal INR 1.5 - 2.5)    Recent labs: (last 7 days)     11/14/22  1539   INR 1.9*       ASSESSMENT       Source(s): Chart Review       Warfarin doses taken: Reviewed in chart.  Rica called--she reports on 11/10/22 she gave Sha his medications twice, so he had 10 mg that day.    Diet: Per [facility] visit on 11/11/22, Sha is eating less greens and is eating less overall due to falls and unable to walk to the bathroom    New illness, injury, or hospitalization: No    Medication/supplement changes: None noted    Signs or symptoms of bleeding or clotting: No    Previous INR: Supratherapeutic    Additional findings: Per Epic note, Sha will be admitted to [facility] TCU tomorrow, 11/15/22 for rehab.  He is having trouble with falls, getting around.  This writer attempted to call [facility] to see if they manage the INR/warfarin while he is inpatient there.  There was no answer, will try again 11/15/22. In basket message sent to call the TCU and see if they will manage.                       [facility] admissions phone:  [phone]     PLAN     Recommended plan for no diet, medication or health factor changes affecting INR     Dosing Instructions: Continue your current warfarin dose with next INR in 1 week       Summary  As of 11/14/2022    Full warfarin instructions:  2.5 mg every Sun, Tue, Thu; 5 mg all other days; Starting 11/14/2022   Next INR check:  11/18/2022             Detailed voice message left for  Rica with dosing instructions and follow up date.    Rica called back.  Sha will go to the TCU 11/15/22.  Per [facility], Sha will be admitted to [facility] tomorrow, 11/15/22.  Need to call and see if they will be responsible for his warfarin/INR while he is there.    Education provided:     Please call back if any changes to your diet, medications or how you've been taking  warfarin    Contact [phone] with any changes, questions or concerns.     Plan made per [facility] anticoagulation protocol    Danya Edwards RN  Anticoagulation Clinic  11/14/2022    _______________________________________________________________________     Anticoagulation Episode Summary     Current INR goal:  Other - see comment   TTR:  54.4 % (1 y)   Target end date:  Indefinite   Send INR reminders to:  [facility]    Indications    Atrial fibrillation (H) [I48.91] [I48.91]  Long-term (current) use of anticoagulants [Z79.01] [Z79.01]  Deep vein thrombosis (DVT) (H) [I82.409]  Atrial fibrillation  unspecified type (H) [I48.91]           Comments:  Hx of Falls/Bleed  Goal range: 1.5-2.5         Anticoagulation Care Providers     Provider Role Specialty Phone number    Frida Desai MD Referring Cardiovascular Disease [phone]    [facility] bal MD Referring Internal Medicine [phone]

## 2022-11-14 NOTE — LETTER
[facility] C[facility]  [address] [id number] [phone]    FACSIMILE TRANSMITTAL SHEET    TO: [facility]     FROM:   JOANA Junior  Medical Social Worker  [facility]  Care Management  [address] [place] [address]  [id number]  [place] [address]  [email or web address]   Office: [phone]  Fax: [phone]      DATE: 11/14/22      NOTES/COMMENTS: See updated orders. H&P (note from Dr. Gerber) was updated/addended today by Dr. Sarmiento)                                      IF YOU DID NOT RECEIVE THE CORRECT NUMBER OF PAGES OR THE FAX DID NOT COME THROUGH CLEARLY, PLEASE CALL THE SENDER     CONFIDENTIALITY STATEMENT: Confidential information that may accompany this transmission contains protected health information under state and federal law and is legally privileged. This information is intended only for the use of the individual or entity named above and may be used only for carrying out treatment, payment or other healthcare operations. The recipient or person responsible for delivering this information is prohibited by law from disclosing this information without proper authorization to any other party, unless required to do so by law or regulation. If you are not the intended recipient, you are hereby notified that any review, dissemination, distribution, or copying of this message is strictly prohibited. If you have received this communication in error, please destroy the materials and contact us immediately by calling the number listed above. No response indicates that the information was received by the appropriate authorized party

## 2022-11-15 ENCOUNTER — LAB REQUISITION (OUTPATIENT)
Dept: LAB | Facility: CLINIC | Age: 87
End: 2022-11-15
Payer: COMMERCIAL

## 2022-11-15 ENCOUNTER — TELEPHONE (OUTPATIENT)
Dept: ANTICOAGULATION | Facility: CLINIC | Age: 87
End: 2022-11-15

## 2022-11-15 DIAGNOSIS — Z11.1 ENCOUNTER FOR SCREENING FOR RESPIRATORY TUBERCULOSIS: ICD-10-CM

## 2022-11-15 DIAGNOSIS — I48.91 ATRIAL FIBRILLATION, UNSPECIFIED TYPE (H): Primary | ICD-10-CM

## 2022-11-15 DIAGNOSIS — I82.409 DEEP VEIN THROMBOSIS (DVT) (H): ICD-10-CM

## 2022-11-15 DIAGNOSIS — Z79.01 LONG TERM CURRENT USE OF ANTICOAGULANT THERAPY: ICD-10-CM

## 2022-11-15 NOTE — TELEPHONE ENCOUNTER
11/15/22    Patient to be followed by [facility] Anticoagulation while admitted.  Spoke to Marlena Coats desderick at TCU.  Reviewed patients recent dosing.  They will get INR draws and dose Bill while he is there.  Expected to discharge in 2-3 weeks, and the [facility]tico[facility] will pick patient back up then.  Reviewed contact information with [facility] staff, and requested they call when Bill is discharged home.  Staff verbalized understanding.    Ciro Bajwa RN

## 2022-11-16 LAB — INR (EXTERNAL): 1.6 (ref 0.9–1.1)

## 2022-11-16 PROCEDURE — P9604 ONE-WAY ALLOW PRORATED TRIP: HCPCS | Mod: ORL | Performed by: INTERNAL MEDICINE

## 2022-11-16 PROCEDURE — 36415 COLL VENOUS BLD VENIPUNCTURE: CPT | Mod: ORL | Performed by: INTERNAL MEDICINE

## 2022-11-16 PROCEDURE — 86481 TB AG RESPONSE T-CELL SUSP: CPT | Mod: ORL | Performed by: INTERNAL MEDICINE

## 2022-11-17 LAB
GAMMA INTERFERON BACKGROUND BLD IA-ACNC: 0 IU/ML
M TB IFN-G BLD-IMP: NEGATIVE
M TB IFN-G CD4+ BCKGRND COR BLD-ACNC: 4.33 IU/ML
MITOGEN IGNF BCKGRD COR BLD-ACNC: 0.01 IU/ML
MITOGEN IGNF BCKGRD COR BLD-ACNC: 0.01 IU/ML
QUANTIFERON MITOGEN: 4.33 IU/ML
QUANTIFERON NIL TUBE: 0 IU/ML
QUANTIFERON TB1 TUBE: 0.01 IU/ML
QUANTIFERON TB2 TUBE: 0.01

## 2022-11-18 ENCOUNTER — TRANSITIONAL CARE UNIT VISIT (OUTPATIENT)
Dept: GERIATRICS | Facility: CLINIC | Age: 87
End: 2022-11-18
Payer: COMMERCIAL

## 2022-11-18 VITALS
WEIGHT: 150 LBS | HEART RATE: 60 BPM | SYSTOLIC BLOOD PRESSURE: 107 MMHG | TEMPERATURE: 97.2 F | BODY MASS INDEX: 23.49 KG/M2 | OXYGEN SATURATION: 98 % | DIASTOLIC BLOOD PRESSURE: 59 MMHG | RESPIRATION RATE: 16 BRPM

## 2022-11-18 DIAGNOSIS — R53.81 PHYSICAL DECONDITIONING: ICD-10-CM

## 2022-11-18 DIAGNOSIS — I25.10 CORONARY ARTERY DISEASE INVOLVING NATIVE CORONARY ARTERY OF NATIVE HEART, UNSPECIFIED WHETHER ANGINA PRESENT: ICD-10-CM

## 2022-11-18 DIAGNOSIS — I10 HYPERTENSION, UNSPECIFIED TYPE: ICD-10-CM

## 2022-11-18 DIAGNOSIS — B35.3 TINEA PEDIS OF BOTH FEET: ICD-10-CM

## 2022-11-18 DIAGNOSIS — F32.A DEPRESSION, UNSPECIFIED DEPRESSION TYPE: ICD-10-CM

## 2022-11-18 DIAGNOSIS — I48.91 ATRIAL FIBRILLATION, UNSPECIFIED TYPE (H): ICD-10-CM

## 2022-11-18 DIAGNOSIS — M81.8 OTHER OSTEOPOROSIS WITHOUT CURRENT PATHOLOGICAL FRACTURE: ICD-10-CM

## 2022-11-18 DIAGNOSIS — N18.31 STAGE 3A CHRONIC KIDNEY DISEASE (H): ICD-10-CM

## 2022-11-18 DIAGNOSIS — M35.3 POLYMYALGIA RHEUMATICA (H): ICD-10-CM

## 2022-11-18 DIAGNOSIS — R29.6 RECURRENT FALLS: Primary | ICD-10-CM

## 2022-11-18 DIAGNOSIS — I25.5 ISCHEMIC CARDIOMYOPATHY: ICD-10-CM

## 2022-11-18 DIAGNOSIS — I87.2 VENOUS STASIS DERMATITIS OF BOTH LOWER EXTREMITIES: ICD-10-CM

## 2022-11-18 DIAGNOSIS — R32 URINARY INCONTINENCE, UNSPECIFIED TYPE: ICD-10-CM

## 2022-11-18 LAB — INR (EXTERNAL): 1.6 (ref 0.9–1.1)

## 2022-11-18 PROCEDURE — 99310 SBSQ NF CARE HIGH MDM 45: CPT | Performed by: NURSE PRACTITIONER

## 2022-11-18 RX ORDER — BACITRACIN 500 [USP'U]/G
OINTMENT OPHTHALMIC
Status: ON HOLD | COMMUNITY
End: 2024-01-01

## 2022-11-18 NOTE — PROGRESS NOTES
[facility] GERIATRICS  PRIMARY CARE PROVIDER AND CLINIC:  Roberto Sarmiento MD, [address] [facility] [address]  Chief Complaint   Patient presents with     Hospital F/U      [facility] Record Number:  [id number]  Place of Service where encounter took place:  [facility] ([facility]) [[id number]]    Noe Florence  is a 87 year old  (1935), admitted to the above facility from home due to care needs recurrent falls.      HPI:    This is an 87-year-old male, with a past medical history significant for recurrent falls, muscle weakness, peripheral arterial disease, neuropathy, foot flare, degenerative joint disease of the left knee, atrial fibrillation on anticoagulation, hypertension, hyperlipidemia, coronary artery disease s/p CABG x 2 with JEREMY, V Tach s/p ICD, anemia, DVT, depression, and colon cancer s/p laparoscopic resection 2019, who was admitted to [facility] from home after having recurrent falls.    Today, patient's significant other is present. Wife describes events leading up to TCU stay. Has been at [facility] in the past and had a good experience. Notes patient has been falling more. Right foot freezes. Most recent fall, backed up in the kitchen. Became weaker and starting having a PCA 4 days per week this summer for a couple hours. Diet has been more erratic lately and feels this has caused INR to be off. Wears compression stockings at home. Patient sleeps on and off throughout the visit.    CODE STATUS/ADVANCE DIRECTIVES DISCUSSION:  Full Code   ALLERGIES:   Allergies   Allergen Reactions     Latex Rash     Rash      PAST MEDICAL HISTORY:   Past Medical History:   Diagnosis Date     Advanced open-angle glaucoma      Atrial fibrillation (H)      CKD (chronic kidney disease), stage III (H) 2005     Colon cancer (H)     Stage II-B colon cancer     Coronary artery disease     s/p CABG x 2, JEREMY x 2     Diverticulitis      Hyperlipidemia      Hypertension      Ischemic  cardiomyopathy      MGUS (monoclonal gammopathy of unknown significance)      Nonsenile cataract     BE     Osteoporosis     left hip fracture     Polymorphic ventricular tachycardia      Polymyalgia rheumatica (H)      PVD (posterior vitreous detachment), both eyes 2005     S/P ablation of atrial flutter 6/20/14    CTI     Stented coronary artery      SVT (supraventricular tachycardia) (H)     PPM/AICD for NSVT     Upper leg DVT (deep venous thromboembolism), chronic (H)     Left     Weight loss, unintentional 2017    15 lb in 4 months      PAST SURGICAL HISTORY:   has a past surgical history that includes CABG, ARTERY-VEIN, FOUR (2006); Repair valve mitral (2006); Heart Cath Drug eluting stent placement (4/19/2012); CABG, VEIN, SINGLE (1994); Implant implantable cardioverter defibrillator (5-); knee surgery; shoulder surgery; Colonoscopy (3/13/2014); Selective Laser Trabeculoplasty (SLT) OD (right eye) (4/10, 1/12,+1/9/13); Selective Laser Trabeculoplasty (SLT) OS (left eye) (5/2012); Laparoscopic assisted colectomy left (descending) (4/8/2014); Colonoscopy (N/A, 6/22/2015); cataract iol, rt/lt (Bilateral); EP Ablation atrial flutter; Colonoscopy (N/A, 11/7/2018); Implantable Cardioverter Defibrillator Generator Replacement Dual (N/A, 12/11/2018); Laparoscopic assisted colectomy (Right, 2/1/2019); and Open reduction internal fixation rodding intramedullar femur fracture table (Left, 3/14/2019).  FAMILY HISTORY: family history includes C.A.D. in his father.  SOCIAL HISTORY:   reports that he quit smoking about 54 years ago. His smoking use included cigarettes and cigars. He has never used smokeless tobacco. He reports current alcohol use. He reports that he does not use drugs.  Patient's living condition: lives with partner    Current Outpatient Medications   Medication Sig     alendronate (FOSAMAX) 70 MG tablet Take 1 tablet (70 mg) by mouth every 7 days Take with a full glass of water and do not eat or lay  down for 30 minutes     ARIPiprazole (ABILIFY) 2 MG tablet Take 2 mg by mouth daily     bacitracin 500 UNIT/GM ophthalmic ointment 1 application, topical, Once A Day, Apply to open areas on right great to and right second toe and cover with dressing daily (has pressure area to toes)     calcium carbonate 500 mg, elemental, (OSCAL;OYSTER SHELL CALCIUM) 500 MG tablet Take 1 tablet (500 mg) by mouth 2 times daily     cholecalciferol 1000 units TABS Take 1,000 Units by mouth daily      COMPRESSION STOCKINGS 1 each daily     cyanocobalamin (VITAMIN B-12) 1000 MCG tablet Take 4 daily     Emollient (CERAVE SA RENEWING) LOTN 1 application, topical, Once A Day, Apply to torso     Emollient (CERAVE) CREA 1 application, topical, Once A Day, Apply to bilateral legs     ferrous sulfate (FE TABS) 325 (65 Fe) MG EC tablet Take 325 mg by mouth daily     ketoconazole (NIZORAL) 2 % external cream Apply to affected areas on chest and feet twice daily as needed     ketoconazole (NIZORAL) 2 % external cream Apply to the feet 2 times a day until rash clears then 3-4 times a week for maintenance     ketoconazole (NIZORAL) 2 % external shampoo Apply to scalp daily as needed until flaking resolves     metoprolol tartrate (LOPRESSOR) 25 MG tablet Take 1 tablet (25 mg) by mouth 2 times daily     mirtazapine (REMERON) 15 MG tablet Take 15 mg by mouth At Bedtime.     Multiple Vitamins-Minerals (MULTIVITAMIN ADULT PO)      sertraline (ZOLOFT) 100 MG tablet Take 150 mg by mouth every evening      tamsulosin (FLOMAX) 0.4 MG capsule TAKE 2 CAPSULES (0.8 MG) BY MOUTH DAILY     tiZANidine (ZANAFLEX) 2 MG tablet Take 1-2 tablets (2-4 mg) by mouth 3 times daily as needed for muscle spasms     warfarin ANTICOAGULANT (JANTOVEN ANTICOAGULANT) 5 MG tablet TAKE ONE TABLET BY MOUTH DAILY OR AS DIRECTED BY THE COUMADIN CLINIC     No current facility-administered medications for this visit.     ROS:  4 point ROS including Respiratory, CV, GI and , other than  that noted in the HPI,  is negative    Vitals:  /59   Pulse 60   Temp 97.2  F (36.2  C)   Resp 16   Wt 68 kg (150 lb)   SpO2 98%   BMI 23.49 kg/m    Exam:  GENERAL APPEARANCE:  Alert, in no distress  ENT:  Mouth and posterior oropharynx normal, moist mucous membranes  EYES:  EOM, conjunctivae, lids, pupils and irises normal  RESP:  respiratory effort and palpation of chest normal  CV:  Palpation and auscultation of heart done , regular rate and rhythm, no murmur, rub, or gallop  ABDOMEN:  normal bowel sounds, soft, nontender, no hepatosplenomegaly or other masses  M/S:   1+ edema in BLE  SKIN:  Inspection of skin and subcutaneous tissue baseline, Palpation of skin and subcutaneous tissue baseline  NEURO:   Cranial nerves 2-12 are normal tested and grossly at patient's baseline  PSYCH:  oriented to person and place    Lab/Diagnostic data:  Labs done in SNF are in [place] EPIC. Please refer to them using Risk Ident/RSP Tooling Everywhere.    ASSESSMENT/PLAN:  Recurrent Falls. With legs giving out per patient and significant other's report. Physical and Occupational Therapy ordered. Goal is to get stronger and return home.    Neuropathy, Chronic Venous Insufficiency, Bilateral Lower Extremity Edema, and Left Foot Drop. Related to above. Physical and Occupational Therapy ordered. Takes Tizanidine for muscle spasms.    Atrial Fibrillation on Anticoagulation, History of Recurrent DVT, and History of V. Tach S/P ICD Placement. Today, Warfarin 1.6. Home dose of Warfarin 2.5 mg on Tu, Th, Sun and 5 mg all other days. Had issues with irregular dosing PTA as significant other gave medication twice on 11/10/22. SO also reports patient diet was more irregular PTA. Wanted to avoid having bowel movements and falling on the floor. Will order Warfarin 5 mg PO every day. Repeat INR on 11/21/22. Takes Metoprolol. Monitor heart rate daily.    Coronary Artery Disease S/P CABG x 2 with JEREMY, Hypertension, and Hyperlipidemia. Followed by [facility]. Taken off Aspirin due to recurrent falls. No longer on Atorvastatin due to concern for myalgias. Monitor blood pressure daily. Takes Metoprolol.    Anemia. Baseline Hemoglobin ~ 10-11. Last Hemoglobin 10.5 on 10/28/22. Repeat labs next week to ensure stability. Takes Ferrous Sulfate.    Chronic Kidney Disease Stage III. Baseline Creatinine mid 1s. Last Creatinine 1.73 on 10/28/22. Repeat labs next week to ensure stability given erratic diet as of lately.    Depression. Significant other reports patient has a psychiatrist. Taking Aripiprazole, Mirtazapine, and Sertraline.    Osteoporosis. Continue Alendronate and Cholecalciferol as ordered.    Urinary Incontinence. Continue Tamsulosin as ordered.    History of Colon Cancer S/P Laparoscopic Resection 2019. Followed by Oncology. Last seen 10/3/22.    Elevated MMA. On high dose Vitamin B12 supplementation.     Tinea Pedis and Stasis Dermatitis. Has various lotions ordered for bilateral lower extremities and feet.    Polymyalgia Rheumatica. Noted in history. Requires no medication.     Physical Deconditioning. Physical and Occupational Therapy ordered.    Orders:  Ok to use home compression stockings. On in am. Off in pm.    Total time spent with patient visit at the skilled nursing facility was 35 min including patient visit and review of past records. Greater than 50% of total time spent with counseling and coordinating care due to review of past medical history, review of recent clinic notes, discussion with patient and significant other regarding code status, review of provider role in TCU, and orders entered into facility EHR.     Electronically signed by:  MARIA LUISA Morataya CNP

## 2022-11-18 NOTE — LETTER
11/18/2022        RE: Noe Florence  [address] [facility] [address]        [facility] GERIATRICS  PRIMARY CARE PROVIDER AND CLINIC:  Roberto Sarmiento MD, [address] [facility] [address]  Chief Complaint   Patient presents with     Hospital F/U      [place] Medical Record Number:  [id number]  Place of Service where encounter took place:  [facility] ([facility]) [[id number]]    Noe Florence  is a 87 year old  (1935), admitted to the above facility from home due to care needs recurrent falls.      HPI:    This is an 87-year-old male, with a past medical history significant for recurrent falls, muscle weakness, peripheral arterial disease, neuropathy, foot flare, degenerative joint disease of the left knee, atrial fibrillation on anticoagulation, hypertension, hyperlipidemia, coronary artery disease s/p CABG x 2 with JEREMY, V Tach s/p ICD, anemia, DVT, depression, and colon cancer s/p laparoscopic resection 2019, who was admitted to [facility] from home after having recurrent falls.    Today, patient's significant other is present. Wife describes events leading up to TCU stay. Has been at [facility] in the past and had a good experience. Notes patient has been falling more. Right foot freezes. Most recent fall, backed up in the kitchen. Became weaker and starting having a PCA 4 days per week this summer for a couple hours. Diet has been more erratic lately and feels this has caused INR to be off. Wears compression stockings at home. Patient sleeps on and off throughout the visit.    CODE STATUS/ADVANCE DIRECTIVES DISCUSSION:  Full Code   ALLERGIES:   Allergies   Allergen Reactions     Latex Rash     Rash      PAST MEDICAL HISTORY:   Past Medical History:   Diagnosis Date     Advanced open-angle glaucoma      Atrial fibrillation (H)      CKD (chronic kidney disease), stage III (H) 2005     Colon cancer (H)     Stage II-B colon cancer     Coronary artery disease      s/p CABG x 2, JEREMY x 2     Diverticulitis      Hyperlipidemia      Hypertension      Ischemic cardiomyopathy      MGUS (monoclonal gammopathy of unknown significance)      Nonsenile cataract     BE     Osteoporosis     left hip fracture     Polymorphic ventricular tachycardia      Polymyalgia rheumatica (H)      PVD (posterior vitreous detachment), both eyes 2005     S/P ablation of atrial flutter 6/20/14    CTI     Stented coronary artery      SVT (supraventricular tachycardia) (H)     PPM/AICD for NSVT     Upper leg DVT (deep venous thromboembolism), chronic (H)     Left     Weight loss, unintentional 2017    15 lb in 4 months      PAST SURGICAL HISTORY:   has a past surgical history that includes CABG, ARTERY-VEIN, FOUR (2006); Repair valve mitral (2006); Heart Cath Drug eluting stent placement (4/19/2012); CABG, VEIN, SINGLE (1994); Implant implantable cardioverter defibrillator (5-); knee surgery; shoulder surgery; Colonoscopy (3/13/2014); Selective Laser Trabeculoplasty (SLT) OD (right eye) (4/10, 1/12,+1/9/13); Selective Laser Trabeculoplasty (SLT) OS (left eye) (5/2012); Laparoscopic assisted colectomy left (descending) (4/8/2014); Colonoscopy (N/A, 6/22/2015); cataract iol, rt/lt (Bilateral); EP Ablation atrial flutter; Colonoscopy (N/A, 11/7/2018); Implantable Cardioverter Defibrillator Generator Replacement Dual (N/A, 12/11/2018); Laparoscopic assisted colectomy (Right, 2/1/2019); and Open reduction internal fixation rodding intramedullar femur fracture table (Left, 3/14/2019).  FAMILY HISTORY: family history includes C.A.D. in his father.  SOCIAL HISTORY:   reports that he quit smoking about 54 years ago. His smoking use included cigarettes and cigars. He has never used smokeless tobacco. He reports current alcohol use. He reports that he does not use drugs.  Patient's living condition: lives with partner    Current Outpatient Medications   Medication Sig     alendronate (FOSAMAX) 70 MG tablet Take  1 tablet (70 mg) by mouth every 7 days Take with a full glass of water and do not eat or lay down for 30 minutes     ARIPiprazole (ABILIFY) 2 MG tablet Take 2 mg by mouth daily     bacitracin 500 UNIT/GM ophthalmic ointment 1 application, topical, Once A Day, Apply to open areas on right great to and right second toe and cover with dressing daily (has pressure area to toes)     calcium carbonate 500 mg, elemental, (OSCAL;OYSTER SHELL CALCIUM) 500 MG tablet Take 1 tablet (500 mg) by mouth 2 times daily     cholecalciferol 1000 units TABS Take 1,000 Units by mouth daily      COMPRESSION STOCKINGS 1 each daily     cyanocobalamin (VITAMIN B-12) 1000 MCG tablet Take 4 daily     Emollient (CERAVE SA RENEWING) LOTN 1 application, topical, Once A Day, Apply to torso     Emollient (CERAVE) CREA 1 application, topical, Once A Day, Apply to bilateral legs     ferrous sulfate (FE TABS) 325 (65 Fe) MG EC tablet Take 325 mg by mouth daily     ketoconazole (NIZORAL) 2 % external cream Apply to affected areas on chest and feet twice daily as needed     ketoconazole (NIZORAL) 2 % external cream Apply to the feet 2 times a day until rash clears then 3-4 times a week for maintenance     ketoconazole (NIZORAL) 2 % external shampoo Apply to scalp daily as needed until flaking resolves     metoprolol tartrate (LOPRESSOR) 25 MG tablet Take 1 tablet (25 mg) by mouth 2 times daily     mirtazapine (REMERON) 15 MG tablet Take 15 mg by mouth At Bedtime.     Multiple Vitamins-Minerals (MULTIVITAMIN ADULT PO)      sertraline (ZOLOFT) 100 MG tablet Take 150 mg by mouth every evening      tamsulosin (FLOMAX) 0.4 MG capsule TAKE 2 CAPSULES (0.8 MG) BY MOUTH DAILY     tiZANidine (ZANAFLEX) 2 MG tablet Take 1-2 tablets (2-4 mg) by mouth 3 times daily as needed for muscle spasms     warfarin ANTICOAGULANT (JANTOVEN ANTICOAGULANT) 5 MG tablet TAKE ONE TABLET BY MOUTH DAILY OR AS DIRECTED BY THE COUMADIN CLINIC     No current facility-administered  medications for this visit.     ROS:  4 point ROS including Respiratory, CV, GI and , other than that noted in the HPI,  is negative    Vitals:  /59   Pulse 60   Temp 97.2  F (36.2  C)   Resp 16   Wt 68 kg (150 lb)   SpO2 98%   BMI 23.49 kg/m    Exam:  GENERAL APPEARANCE:  Alert, in no distress  ENT:  Mouth and posterior oropharynx normal, moist mucous membranes  EYES:  EOM, conjunctivae, lids, pupils and irises normal  RESP:  respiratory effort and palpation of chest normal  CV:  Palpation and auscultation of heart done , regular rate and rhythm, no murmur, rub, or gallop  ABDOMEN:  normal bowel sounds, soft, nontender, no hepatosplenomegaly or other masses  M/S:   1+ edema in BLE  SKIN:  Inspection of skin and subcutaneous tissue baseline, Palpation of skin and subcutaneous tissue baseline  NEURO:   Cranial nerves 2-12 are normal tested and grossly at patient's baseline  PSYCH:  oriented to person and place    Lab/Diagnostic data:  Labs done in SNF are in [facility]. Please refer to them using [email or web address] Everywhere.    ASSESSMENT/PLAN:  Recurrent Falls. With legs giving out per patient and significant other's report. Physical and Occupational Therapy ordered. Goal is to get stronger and return home.    Neuropathy, Chronic Venous Insufficiency, Bilateral Lower Extremity Edema, and Left Foot Drop. Related to above. Physical and Occupational Therapy ordered. Takes Tizanidine for muscle spasms.    Atrial Fibrillation on Anticoagulation, History of Recurrent DVT, and History of V. Tach S/P ICD Placement. Today, Warfarin 1.6. Home dose of Warfarin 2.5 mg on Tu, Th, Sun and 5 mg all other days. Had issues with irregular dosing PTA as significant other gave medication twice on 11/10/22. SO also reports patient diet was more irregular PTA. Wanted to avoid having bowel movements and falling on the floor. Will order Warfarin 5 mg PO every day. Repeat INR on 11/21/22. Takes Metoprolol. Monitor heart rate  daily.    Coronary Artery Disease S/P CABG x 2 with JEREMY, Hypertension, and Hyperlipidemia. Followed by [facility]. Taken off Aspirin due to recurrent falls. No longer on Atorvastatin due to concern for myalgias. Monitor blood pressure daily. Takes Metoprolol.    Anemia. Baseline Hemoglobin ~ 10-11. Last Hemoglobin 10.5 on 10/28/22. Repeat labs next week to ensure stability. Takes Ferrous Sulfate.    Chronic Kidney Disease Stage III. Baseline Creatinine mid 1s. Last Creatinine 1.73 on 10/28/22. Repeat labs next week to ensure stability given erratic diet as of lately.    Depression. Significant other reports patient has a psychiatrist. Taking Aripiprazole, Mirtazapine, and Sertraline.    Osteoporosis. Continue Alendronate and Cholecalciferol as ordered.    Urinary Incontinence. Continue Tamsulosin as ordered.    History of Colon Cancer S/P Laparoscopic Resection 2019. Followed by Oncology. Last seen 10/3/22.    Elevated MMA. On high dose Vitamin B12 supplementation.     Tinea Pedis and Stasis Dermatitis. Has various lotions ordered for bilateral lower extremities and feet.    Polymyalgia Rheumatica. Noted in history. Requires no medication.     Physical Deconditioning. Physical and Occupational Therapy ordered.    Orders:  Ok to use home compression stockings. On in am. Off in pm.    Total time spent with patient visit at the skilled nursing facility was 35 min including patient visit and review of past records. Greater than 50% of total time spent with counseling and coordinating care due to review of past medical history, review of recent clinic notes, discussion with patient and significant other regarding code status, review of provider role in TCU, and orders entered into facility EHR.     Electronically signed by:  MARIA LUISA Morataya CNP                       Sincerely,        MARIA LUISA Morataya CNP

## 2022-11-21 ENCOUNTER — TRANSITIONAL CARE UNIT VISIT (OUTPATIENT)
Dept: GERIATRICS | Facility: CLINIC | Age: 87
End: 2022-11-21
Payer: COMMERCIAL

## 2022-11-21 VITALS
BODY MASS INDEX: 23.49 KG/M2 | SYSTOLIC BLOOD PRESSURE: 106 MMHG | HEART RATE: 60 BPM | TEMPERATURE: 97.9 F | OXYGEN SATURATION: 96 % | DIASTOLIC BLOOD PRESSURE: 58 MMHG | WEIGHT: 150 LBS | RESPIRATION RATE: 18 BRPM

## 2022-11-21 DIAGNOSIS — I10 HYPERTENSION, UNSPECIFIED TYPE: ICD-10-CM

## 2022-11-21 DIAGNOSIS — M35.3 POLYMYALGIA RHEUMATICA (H): ICD-10-CM

## 2022-11-21 DIAGNOSIS — I48.91 ATRIAL FIBRILLATION, UNSPECIFIED TYPE (H): ICD-10-CM

## 2022-11-21 DIAGNOSIS — R53.81 PHYSICAL DECONDITIONING: Primary | ICD-10-CM

## 2022-11-21 LAB — INR (EXTERNAL): 2.4 (ref 0.9–1.1)

## 2022-11-21 PROCEDURE — 99309 SBSQ NF CARE MODERATE MDM 30: CPT | Performed by: NURSE PRACTITIONER

## 2022-11-21 NOTE — PROGRESS NOTES
[facility] GERIATRICS  Chief Complaint   Patient presents with     RECHECK     HPI:  Noe Florence is a 87 year old  (1935), who is being seen today for an episodic care visit at: [facility] ([facility]) [[id number]].     Background:    This is an 87-year-old male, with a past medical history significant for recurrent falls, muscle weakness, peripheral arterial disease, neuropathy, foot flare, degenerative joint disease of the left knee, atrial fibrillation on anticoagulation, hypertension, hyperlipidemia, coronary artery disease s/p CABG x 2 with JEREMY, V Tach s/p ICD, anemia, DVT, depression, and colon cancer s/p laparoscopic resection 2019, who was admitted to [facility] from home after having recurrent falls.    Today's concern is:     Atrial Fibrillation on Anticoagulation, History of Recurrent DVT, and History of V. Tach S/P ICD Placement. Today, INR 2.4. Previous INR and Warfarin dosing has been as follows:    Date INR New Dose/Orders   11/16.22 1.6 Warfarin 2.5 mg on Tu, Th, Sun and 5 mg PO QOD   11/18/22 1.6 Warfarin 5 mg PO every day    11/21/22 2.4 Warfarin 2.5 mg PO on Tu, Th, Sun and 5 mg PO AOD     Physical Deconditioning. Working with Physical and Occupational Therapy. Upon review of documentation, CGA to EZ stand with transfers. Ambulating 100 feet with FWW and minimal assistance. Maximum assistance with dressing and toileting. SLUMS Score 6/30.    Coronary Artery Disease S/P CABG x 2 with JEREMY, Hypertension, and Hyperlipidemia. Upon review of blood pressures over the past 5 days, systolic range from 106-174, most < 140. Diastolic range from 43-76.    Allergies, and PMH/PSH reviewed in [facility]iatric today.  REVIEW OF SYSTEMS:  4 point ROS including Respiratory, CV, GI and , other than that noted in the HPI,  is negative    Objective:   /58   Pulse 60   Temp 97.9  F (36.6  C)   Resp 18   Wt 68 kg (150 lb)   SpO2 96%   BMI 23.49 kg/m    GENERAL APPEARANCE:  Alert, in no  distress  ENT:  Mouth and posterior oropharynx normal, moist mucous membranes  EYES:  EOM, conjunctivae, lids, pupils and irises normal  RESP:  respiratory effort and palpation of chest normal, lungs clear to auscultation , no respiratory distress  CV:  Palpation and auscultation of heart done , regular rate and rhythm, no murmur, rub, or gallop  ABDOMEN:  normal bowel sounds, soft, nontender, no hepatosplenomegaly or other masses  M/S:   Active movement of bilateral upper extremities.  SKIN:  Inspection of skin and subcutaneous tissue baseline, Palpation of skin and subcutaneous tissue baseline  NEURO:   Cranial nerves 2-12 are normal tested and grossly at patient's baseline  PSYCH:  oriented to person    Labs done in SNF are in [facility]. Please refer to them using "[employer] Everywhere.    Assessment/Plan:  Recurrent Falls. With legs giving out per patient and significant other's report. Physical and Occupational Therapy ordered. Goal is to get stronger and return home.     Neuropathy, Chronic Venous Insufficiency, Bilateral Lower Extremity Edema, and Left Foot Drop. Related to above. Physical and Occupational Therapy ordered. Takes Tizanidine for muscle spasms.     Atrial Fibrillation on Anticoagulation, History of Recurrent DVT, and History of V. Tach S/P ICD Placement. INR goal 1.5-2.5 due to history of falls and bleed. Most recent home dose per [facility] Warfarin 2.5 mg on Tu, Th, Sun and 5 mg all other days.Will order home dose of Warfarin. Repeat INR on 11/25/22. Takes Metoprolol. Has had intermittent episodes of bradycardia since TCU admission. Continue to monitor heart rate.     Coronary Artery Disease S/P CABG x 2 with JEREMY, Hypertension, and Hyperlipidemia. Followed by [facility]. Taken off Aspirin due to recurrent falls. No longer on Atorvastatin due to concern for myalgias. Most blood pressures < 140/90. Takes Metoprolol.    Cognitive Impairment. Based on cognitive testing by  Occupational Therapy. Lived at home with significant other prior to admission.      Anemia. Baseline Hemoglobin ~ 10-11. Last Hemoglobin 10.5 on 10/28/22. Consider repeat labs next week to ensure stability. Takes Ferrous Sulfate.     Chronic Kidney Disease Stage III. Baseline Creatinine mid 1s. Last Creatinine 1.73 on 10/28/22. Consider repeat labs next week to ensure stability given erratic diet as of lately.     Depression. Significant other reports patient has a psychiatrist. Taking Aripiprazole, Mirtazapine, and Sertraline.     Osteoporosis. Continue Alendronate and Cholecalciferol as ordered.     Urinary Incontinence. Continue Tamsulosin as ordered.     History of Colon Cancer S/P Laparoscopic Resection 2019. Followed by Oncology. Last seen 10/3/22.     Elevated MMA. On high dose Vitamin B12 supplementation.      Tinea Pedis and Stasis Dermatitis. Has various lotions ordered for bilateral lower extremities and feet.     Polymyalgia Rheumatica. Noted in history. Requires no medication.      Physical Deconditioning. Physical and Occupational Therapy ordered.    Orders:  Warfarin 2.5 mg on Tu, Th, Sun and 5 mg all other days  Recheck INR on 11/25/22    Electronically signed by: MARIA LUISA Morataya CNP

## 2022-11-21 NOTE — LETTER
11/21/2022        RE: Noe Florence  [address] [facility] [address]        [facility] GERIATRICS  Chief Complaint   Patient presents with     RECHECK     HPI:  Noe Florence is a 87 year old  (1935), who is being seen today for an episodic care visit at: [facility] ([facility]) [[id number]].     Background:    This is an 87-year-old male, with a past medical history significant for recurrent falls, muscle weakness, peripheral arterial disease, neuropathy, foot flare, degenerative joint disease of the left knee, atrial fibrillation on anticoagulation, hypertension, hyperlipidemia, coronary artery disease s/p CABG x 2 with JEREMY, V Tach s/p ICD, anemia, DVT, depression, and colon cancer s/p laparoscopic resection 2019, who was admitted to [facility] from home after having recurrent falls.    Today's concern is:     Atrial Fibrillation on Anticoagulation, History of Recurrent DVT, and History of V. Tach S/P ICD Placement. Today, INR 2.4. Previous INR and Warfarin dosing has been as follows:    Date INR New Dose/Orders   11/16.22 1.6 Warfarin 2.5 mg on Tu, Th, Sun and 5 mg PO QOD   11/18/22 1.6 Warfarin 5 mg PO every day    11/21/22 2.4 Warfarin 2.5 mg PO on Tu, Th, Sun and 5 mg PO AOD     Physical Deconditioning. Working with Physical and Occupational Therapy. Upon review of documentation, CGA to EZ stand with transfers. Ambulating 100 feet with FWW and minimal assistance. Maximum assistance with dressing and toileting. SLUMS Score 6/30.    Coronary Artery Disease S/P CABG x 2 with JEREMY, Hypertension, and Hyperlipidemia. Upon review of blood pressures over the past 5 days, systolic range from 106-174, most < 140. Diastolic range from 43-76.    Allergies, and PMH/PSH reviewed in [facility] today.  REVIEW OF SYSTEMS:  4 point ROS including Respiratory, CV, GI and , other than that noted in the HPI,  is negative    Objective:   /58   Pulse 60   Temp 97.9  F (36.6  C)   Resp 18   Wt 68  kg (150 lb)   SpO2 96%   BMI 23.49 kg/m    GENERAL APPEARANCE:  Alert, in no distress  ENT:  Mouth and posterior oropharynx normal, moist mucous membranes  EYES:  EOM, conjunctivae, lids, pupils and irises normal  RESP:  respiratory effort and palpation of chest normal, lungs clear to auscultation , no respiratory distress  CV:  Palpation and auscultation of heart done , regular rate and rhythm, no murmur, rub, or gallop  ABDOMEN:  normal bowel sounds, soft, nontender, no hepatosplenomegaly or other masses  M/S:   Active movement of bilateral upper extremities.  SKIN:  Inspection of skin and subcutaneous tissue baseline, Palpation of skin and subcutaneous tissue baseline  NEURO:   Cranial nerves 2-12 are normal tested and grossly at patient's baseline  PSYCH:  oriented to person    Labs done in SNF are in [facility]. Please refer to them using [employer] Everywhere.    Assessment/Plan:  Recurrent Falls. With legs giving out per patient and significant other's report. Physical and Occupational Therapy ordered. Goal is to get stronger and return home.     Neuropathy, Chronic Venous Insufficiency, Bilateral Lower Extremity Edema, and Left Foot Drop. Related to above. Physical and Occupational Therapy ordered. Takes Tizanidine for muscle spasms.     Atrial Fibrillation on Anticoagulation, History of Recurrent DVT, and History of V. Tach S/P ICD Placement. INR goal 1.5-2.5 due to history of falls and bleed. Most recent home dose per [facility] Warfarin 2.5 mg on Tu, Th, Sun and 5 mg all other days.Will order home dose of Warfarin. Repeat INR on 11/25/22. Takes Metoprolol. Has had intermittent episodes of bradycardia since TCU admission. Continue to monitor heart rate.     Coronary Artery Disease S/P CABG x 2 with JEREMY, Hypertension, and Hyperlipidemia. Followed by [facility]. Taken off Aspirin due to recurrent falls. No longer on Atorvastatin due to concern for myalgias. Most blood pressures <  140/90. Takes Metoprolol.    Cognitive Impairment. Based on cognitive testing by Occupational Therapy. Lived at home with significant other prior to admission.      Anemia. Baseline Hemoglobin ~ 10-11. Last Hemoglobin 10.5 on 10/28/22. Consider repeat labs next week to ensure stability. Takes Ferrous Sulfate.     Chronic Kidney Disease Stage III. Baseline Creatinine mid 1s. Last Creatinine 1.73 on 10/28/22. Consider repeat labs next week to ensure stability given erratic diet as of lately.     Depression. Significant other reports patient has a psychiatrist. Taking Aripiprazole, Mirtazapine, and Sertraline.     Osteoporosis. Continue Alendronate and Cholecalciferol as ordered.     Urinary Incontinence. Continue Tamsulosin as ordered.     History of Colon Cancer S/P Laparoscopic Resection 2019. Followed by Oncology. Last seen 10/3/22.     Elevated MMA. On high dose Vitamin B12 supplementation.      Tinea Pedis and Stasis Dermatitis. Has various lotions ordered for bilateral lower extremities and feet.     Polymyalgia Rheumatica. Noted in history. Requires no medication.      Physical Deconditioning. Physical and Occupational Therapy ordered.    Orders:  Warfarin 2.5 mg on Tu, Th, Sun and 5 mg all other days  Recheck INR on 11/25/22    Electronically signed by: MARIA LUISA Morataya CNP             Sincerely,        MARIA LUISA Morataya CNP

## 2022-11-22 RX ORDER — UREA 200 MG/G
CREAM TOPICAL 2 TIMES DAILY
Status: ON HOLD | COMMUNITY
End: 2024-01-01

## 2022-11-25 ENCOUNTER — TELEPHONE (OUTPATIENT)
Dept: GERIATRICS | Facility: CLINIC | Age: 87
End: 2022-11-25

## 2022-11-25 ENCOUNTER — TRANSITIONAL CARE UNIT VISIT (OUTPATIENT)
Dept: GERIATRICS | Facility: CLINIC | Age: 87
End: 2022-11-25
Payer: COMMERCIAL

## 2022-11-25 VITALS
WEIGHT: 150 LBS | TEMPERATURE: 96.6 F | OXYGEN SATURATION: 100 % | DIASTOLIC BLOOD PRESSURE: 67 MMHG | SYSTOLIC BLOOD PRESSURE: 122 MMHG | RESPIRATION RATE: 18 BRPM | HEIGHT: 67 IN | BODY MASS INDEX: 23.54 KG/M2 | HEART RATE: 61 BPM

## 2022-11-25 DIAGNOSIS — R41.89 COGNITIVE IMPAIRMENT: ICD-10-CM

## 2022-11-25 DIAGNOSIS — F41.9 ANXIETY AND DEPRESSION: ICD-10-CM

## 2022-11-25 DIAGNOSIS — F32.A ANXIETY AND DEPRESSION: ICD-10-CM

## 2022-11-25 DIAGNOSIS — F22 PARANOIA (H): ICD-10-CM

## 2022-11-25 DIAGNOSIS — Z79.01 LONG TERM CURRENT USE OF ANTICOAGULANT THERAPY: ICD-10-CM

## 2022-11-25 DIAGNOSIS — R29.6 RECURRENT FALLS: Primary | ICD-10-CM

## 2022-11-25 DIAGNOSIS — M81.0 OSTEOPOROSIS, UNSPECIFIED OSTEOPOROSIS TYPE, UNSPECIFIED PATHOLOGICAL FRACTURE PRESENCE: ICD-10-CM

## 2022-11-25 DIAGNOSIS — I48.91 ATRIAL FIBRILLATION, UNSPECIFIED TYPE (H): ICD-10-CM

## 2022-11-25 DIAGNOSIS — R53.81 PHYSICAL DECONDITIONING: ICD-10-CM

## 2022-11-25 DIAGNOSIS — L98.491 SKIN ULCER, LIMITED TO BREAKDOWN OF SKIN (H): ICD-10-CM

## 2022-11-25 LAB — INR (EXTERNAL): 1.8 (ref 0.9–1.1)

## 2022-11-25 PROCEDURE — 99305 1ST NF CARE MODERATE MDM 35: CPT | Performed by: INTERNAL MEDICINE

## 2022-11-25 NOTE — TELEPHONE ENCOUNTER
[facility] Geriatrics Triage Nurse INR     Provider: MARIA LUISA Ma CNP  [facility] Type:  TCU    Caller: Adam  Call Back Number: [phone]  Reason for call: INR  Diagnosis/Goal: A. Fib and DVT    Todays INR: 1.8  Last INR 2.4 on 11/21 - 2.5 mg T/TH/Sun and 5 mg AOD    Heparin/Lovenox:  No  Currently on ABX?: No  Other interacting medication:  None  Missed or refused doses: No    Verbal Order/Direction given by Provider: Coumadin 2.5 mg PO T/TH/Sun and 5 mg PO AOD. Recheck INR on 12/1/22.    Provider Giving Order:  MARIA LUISA Ma CNP    Verbal Order given to: Adam Aguirre RN

## 2022-11-25 NOTE — LETTER
"    11/25/2022        RE: Noe Florence  [address] [facility] [address]        [facility]  PHYSICIAN NOTE    PRIMARY CARE PROVIDER AND CLINIC:  Roberto Sarmiento MD, [address] [facility] [address]    Chief Complaint   Patient presents with     Hospital F/U     [facility] Record Number:  [id number]  Place of Service where encounter took place:  [facility] ([facility] [[id number]]    Noe Florence is a 87 year old (1935), admitted to the above facility from home due to care needs and physical deconditioning with recurrent falls.  Admitted to this facility for  rehab, medical management and nursing care.     HPI:    HPI information obtained from: facility chart records, facility staff, patient report, [facility] chart review and family/first contact SO Rica's report report.     Brief summary of reason for admission from home: Noe \"Bill\" Ludivina is an 87yoM with medical comorbidities as noted below admitted from home d/t slowly progressive frailty and some falls without overt injury. Admitted to TCU for physical therapy and occupational therapy with goal to return back home with LAVONNE Strauss who provides excellent care to him.    Updates on status since skilled nursing admission: Much of the history today is per Rica. She is hoping he can get to an assist of one safely and then return home. They have an open layout where she can be very attentive to his care needs. She says he also has a PCA for morning cares and is looking at hiring another agency to help her bring him out into the community and be more socially/physically engaged again. She feels he has had an overall slow decline amid the pandemic. They used to love going to the theater together. She describes he had a few falls at home prior to admission to TCU including \"one big one\". No LOC with falls. She was pleased with the physical therapy he'd had here a few years back and thus wanted to return " "here. We also reviewed his medications; doesn't use PRN Tizanidine. Sha follows with psychiatrist Dr. Stanton and actually we needed to correct the Sertraline dosing to home dose of 200 mg every evening. She mentions he's had some increased anxiety in the evenings here lately at TCU with some scary delusions/paranoia (senior care, murder). Staff have not reported this though; no overt behaviors. They are going to make sure he doesn't watch drama-filled violent shows in the evening now. She did some reading about \"sundowners\" and wonders about this. We discussed SLUMS 6/30 score which is concerning for dementia and that isn't a diagnosis he's previously carried. She feels he has good conversations with her though has a \"weak voice\". She is going to continue to have him f/u by Dr. Stanton.  In talking with Sha, he doesn't c/o pain or physical concerns. He has some tangential and vague speech; talks about films he has enjoyed. Is slowly eating a cookie (and staff had told me too he takes a long time to eat). He didn't c/o anxiety during our visit today. Allowed physical exam.     CODE STATUS/ADVANCE DIRECTIVES DISCUSSION:   CPR/Full code   Patient's living condition: lives with LAVONNE Strauss    ALLERGIES: Latex    Past Medical History:   Diagnosis Date     Advanced open-angle glaucoma      Atrial fibrillation (H)      CKD (chronic kidney disease), stage III (H) 2005     Colon cancer (H)     Stage II-B colon cancer     Coronary artery disease     s/p CABG x 2, JEREMY x 2     Diverticulitis      Hyperlipidemia      Hypertension      Ischemic cardiomyopathy      MGUS (monoclonal gammopathy of unknown significance)      Nonsenile cataract     BE     Osteoporosis     left hip fracture     Polymorphic ventricular tachycardia      Polymyalgia rheumatica (H)      PVD (posterior vitreous detachment), both eyes 2005     S/P ablation of atrial flutter 6/20/14    CTI     Stented coronary artery      SVT (supraventricular tachycardia) (H)     " PPM/AICD for NSVT     Upper leg DVT (deep venous thromboembolism), chronic (H)     Left     Weight loss, unintentional 2017    15 lb in 4 months      Past Surgical History:   Procedure Laterality Date     CATARACT IOL, RT/LT Bilateral      COLONOSCOPY  3/13/2014    Procedure: COMBINED COLONOSCOPY, SINGLE BIOPSY/POLYPECTOMY BY BIOPSY;;  Surgeon: Mary Gerber MD;  [facility]     COLONOSCOPY N/A 6/22/2015    Procedure: COLONOSCOPY;  Surgeon: Marilin Newman MD;  [facility]     COLONOSCOPY N/A 11/7/2018    Procedure: COMBINED COLONOSCOPY, SINGLE OR MULTIPLE BIOPSY/POLYPECTOMY BY BIOPSY;  Surgeon: Roberto Esteban MD;  Location:  GI     EP ICD GENERATOR REPLACEMENT DUAL N/A 12/11/2018    Procedure: EP ICD Generator Change Dual;  Surgeon: Deedee Baird MD;  Location:  HEART CARDIAC CATH LAB     H ABLATION ATRIAL FLUTTER       HEART CATH DRUG ELUTING STENT PLACEMENT  4/19/2012    JEREMY x 2 to LCx     IMPLANT IMPLANTABLE CARDIOVERTER DEFIBRILLATOR  5-    ppm/aicd     KNEE SURGERY      right and left knee surgeries     LAPAROSCOPIC ASSISTED COLECTOMY Right 2/1/2019    Procedure: Laparoscopic Converted to Open Transverse Colectomy with Lysis of Adhesions;  Surgeon: Chanelle Guzmán MD;  Location:  OR     LAPAROSCOPIC ASSISTED COLECTOMY LEFT (DESCENDING)  4/8/2014    Procedure: LAPAROSCOPIC ASSISTED COLECTOMY LEFT (DESCENDING);  Hand assisted Laparoscopic Sigmoid Colectomy , Laparoscopic mobilization of spleenic flexure *Latex Allergy*Anesthesia General with Block;  Surgeon: Chanelle Guzmán MD;  [facility]     OPEN REDUCTION INTERNAL FIXATION RODDING INTRAMEDULLAR FEMUR FRACTURE TABLE Left 3/14/2019    Procedure: Open Reduction Internal Fixation Left Femur Intramedullar Nailing;  Surgeon: Srikanth Avalos MD;  [facility]     REPAIR VALVE MITRAL  2006     30-mm Medtronic Julian ring      SELECTIVE LASER TRABECULOPLASTY (SLT) OD (RIGHT EYE)   4/10, ,+13    RE     SELECTIVE LASER TRABECULOPLASTY (SLT) OS (LEFT EYE)  2012     SHOULDER SURGERY      right rotator cuff     [facility] CABG, ARTERY-VEIN, FOUR      LIMA-LAD, SVG-Rt PDA, SVG-OM2, SVG-Diag 1     [facility] CABG, VEIN, SINGLE      1-vessel CABG, SVG->PDRCA      Family History   Problem Relation Age of Onset     C.A.D. Father      Social History     Tobacco Use     Smoking status: Former     Types: Cigarettes, Cigars     Quit date: 1968     Years since quittin.9     Smokeless tobacco: Never   Substance Use Topics     Alcohol use: Yes     Alcohol/week: 0.0 standard drinks     Comment: 2-3 drinks twice weekly     Drug use: No        Post-discharge medication reconciliation status: Reviewed and updated in [facility] according to facility MAR    Current Outpatient Medications   Medication Sig Dispense Refill     alendronate (FOSAMAX) 70 MG tablet Take 1 tablet (70 mg) by mouth every 7 days Take with a full glass of water and do not eat or lay down for 30 minutes 12 tablet 3     ARIPiprazole (ABILIFY) 2 MG tablet Take 2 mg by mouth daily       bacitracin 500 UNIT/GM ophthalmic ointment 1 application, topical, Once A Day, Apply to open areas on right great to and right second toe and cover with dressing daily (has pressure area to toes)       calcium carbonate 500 mg, elemental, (OSCAL;OYSTER SHELL CALCIUM) 500 MG tablet Take 1 tablet (500 mg) by mouth 2 times daily 180 tablet 3     cholecalciferol 1000 units TABS Take 1,000 Units by mouth daily        COMPRESSION STOCKINGS 1 each daily 1 each 4     cyanocobalamin (VITAMIN B-12) 1000 MCG tablet Take 4 daily 400 tablet 3     Emollient (CERAVE SA RENEWING) LOTN 1 application, topical, Once A Day, Apply to torso       Emollient (CERAVE) CREA 1 application, topical, Once A Day, Apply to bilateral legs       ferrous sulfate (FE TABS) 325 (65 Fe) MG EC tablet Take 325 mg by mouth daily       ketoconazole (NIZORAL) 2 % external cream Apply to affected  "areas on chest and feet twice daily as needed 60 g 6     ketoconazole (NIZORAL) 2 % external cream Apply to the feet 2 times a day until rash clears then 3-4 times a week for maintenance 60 g 11     ketoconazole (NIZORAL) 2 % external shampoo Apply to scalp daily as needed until flaking resolves 120 mL 1     metoprolol tartrate (LOPRESSOR) 25 MG tablet Take 1 tablet (25 mg) by mouth 2 times daily 180 tablet 3     mirtazapine (REMERON) 15 MG tablet Take 15 mg by mouth At Bedtime.       Multiple Vitamins-Minerals (MULTIVITAMIN ADULT PO)        sertraline (ZOLOFT) 100 MG tablet Take 150 mg by mouth every evening        tamsulosin (FLOMAX) 0.4 MG capsule TAKE 2 CAPSULES (0.8 MG) BY MOUTH DAILY 180 capsule 2     tiZANidine (ZANAFLEX) 2 MG tablet Take 1-2 tablets (2-4 mg) by mouth 3 times daily as needed for muscle spasms 30 tablet 0     urea (GORMEL) 20 % external cream Apply topically 2 times daily Apply to bilateral heels BID       warfarin ANTICOAGULANT (JANTOVEN ANTICOAGULANT) 5 MG tablet TAKE ONE TABLET BY MOUTH DAILY OR AS DIRECTED BY THE COUMADIN CLINIC 90 tablet 1       ROS:  Limited secondary to cognitive impairment but today pt reports as above in HPI    Exam:  /67   Pulse 61   Temp (!) 96.6  F (35.9  C)   Resp 18   Ht 1.702 m (5' 7\")   Wt 68 kg (150 lb)   SpO2 100%   BMI 23.49 kg/m    Alert, sitting up in WC watching TV and slowly eating a cookie and nutritional drink, frail appearing  No scleral icterus  Moist oral mucosa  Heart tones irregularly irregular rate controlled  Lungs clear  Abdomen soft  No edema with compression hose in place  Vague historian and tangential - talks about movies  Slightly forward bent posture but no overt tremor, slow speech with some word finding difficulty  R great toe and R 2nd toe ulcers noted (great toe with superficial skin breakdown and scant serosang drainage and 2nd toe ulcer is dried and nearly scabbed over)  Seems calm and not anxious at the time of my " "visit    Lab/Diagnostic data:  Basic labs checked a month ago  Vitamin D 58 in Nov 2021  INR today 1.8    ASSESSMENT/PLAN:  (R29.6) Recurrent falls  (primary encounter diagnosis)  (R53.81) Physical deconditioning  Slow progression of overall deconditioning with isolation of pandemic leading to frequent falls without overt injury at home  Here for aggressive physical therapy and occupational therapy with goal to return home with SO Rica who provides dedicated care  She also has the help of PCA in the mornings and is planning to hire another agency which will help her bring him out into the community for socialization, etc, to hopefully maintain his functioning for as long as possible once he returns home  However, in order to safely go home she needs him to be strong enough to be a one person assist with transfers  Current physical therapy report: \"*Assist of 1* CGA to EZ stand with transfers. Walking 100  with FWW and min A. Max A needed with dressing and toileting\".  Also, since basic labs last checked a month ago, Rica is in favor of CMP and CBC on Monday 11/28/22  Also discontinued PRN Tizanidine as she reports they don't use that at home    (R41.89) Cognitive impairment  (F22) Paranoia (H)  (F41.9,  F32.A) Anxiety and depression  He follows with outpatient psychiatrist Dr. Stanton for depression/anxiety  He is on Sertraline, Remeron and Abilify  Increased Sertraline today from 150 mg to 200 mg every evening as per Rica that is actually his home dose  He is calm today with noted cognitive impairment as described above but has had some evening anxiety/paranoia here at [facility] according to Rica with scary delusions (snf, murder); she is hoping increasing the Sertraline will help, however, we also discussed his cognitive impairment and she is aware of this to a degree but there isn't any previous formal diagnosis for Bill in this regard  I discussed SLUMS 6/30 which is concerning for dementia but this will " need to be followed over time and see if persistent as it likely is and will need to thus provide more education to Rica in this regard to help in his overall care needs  Discussed reorientation and they are also going to avoid scary TV shows in the evening to see if this helps  Should he potentially need pharmacologic treatment of his evening paranoia/delusions she is planning to seek out his psychiatrist Dr. Stanton    (I48.91) Atrial fibrillation, unspecified type (H)  (Z79.01) Long-term (current) use of anticoagulants [Z79.01]  INR today 1.8 with goal 1.5-2.5 per outpatient clinic; dosing per my colleague and recheck next week    (L98.491) Skin ulcer, limited to breakdown of skin (H)  Improving per Rica; continue daily bandage changes and scant Bacitracin along with reduction of pressure to the area    (M81.0) Osteoporosis, unspecified osteoporosis type, unspecified pathological fracture presence  Is on Fosamax, Ca, D  Per Rica's request, I increased vitamin D to home dosing of 1000 units BID        Electronically signed by:  Renay Srinivasan DO        Sincerely,        Renay Srinivasan DO

## 2022-11-25 NOTE — PROGRESS NOTES
"[facility] GERIATRIC SERVICES  PHYSICIAN NOTE    PRIMARY CARE PROVIDER AND CLINIC:  Roberto Sarmiento MD, [address] [facility] [address]    Chief Complaint   Patient presents with     Hospital F/U     [facility] Record Number:  [id number]  Place of Service where encounter took place:  [facility] (U) [[id number]]    Noe Florence is a 87 year old (1935), admitted to the above facility from home due to care needs and physical deconditioning with recurrent falls.  Admitted to this facility for  rehab, medical management and nursing care.     HPI:    HPI information obtained from: facility chart records, facility staff, patient report, [facility] chart review and family/first contact SO Rica's report report.     Brief summary of reason for admission from home: Noe \"Bill\" Ludivina is an 87yoM with medical comorbidities as noted below admitted from home d/t slowly progressive frailty and some falls without overt injury. Admitted to TCU for physical therapy and occupational therapy with goal to return back home with LAVONNE Strauss who provides excellent care to him.    Updates on status since skilled nursing admission: Much of the history today is per Rica. She is hoping he can get to an assist of one safely and then return home. They have an open layout where she can be very attentive to his care needs. She says he also has a PCA for morning cares and is looking at hiring another agency to help her bring him out into the community and be more socially/physically engaged again. She feels he has had an overall slow decline amid the pandemic. They used to love going to the theater together. She describes he had a few falls at home prior to admission to TCU including \"one big one\". No LOC with falls. She was pleased with the physical therapy he'd had here a few years back and thus wanted to return here. We also reviewed his medications; doesn't use PRN Tizanidine. Sha follows with " "psychiatrist Dr. Stanton and actually we needed to correct the Sertraline dosing to home dose of 200 mg every evening. She mentions he's had some increased anxiety in the evenings here lately at TCU with some scary delusions/paranoia (skilled nursing, murder). Staff have not reported this though; no overt behaviors. They are going to make sure he doesn't watch drama-filled violent shows in the evening now. She did some reading about \"sundowners\" and wonders about this. We discussed SLUMS 6/30 score which is concerning for dementia and that isn't a diagnosis he's previously carried. She feels he has good conversations with her though has a \"weak voice\". She is going to continue to have him f/u by Dr. Stanton.  In talking with Bill, he doesn't c/o pain or physical concerns. He has some tangential and vague speech; talks about films he has enjoyed. Is slowly eating a cookie (and staff had told me too he takes a long time to eat). He didn't c/o anxiety during our visit today. Allowed physical exam.     CODE STATUS/ADVANCE DIRECTIVES DISCUSSION:   CPR/Full code   Patient's living condition: lives with LAVONNE Strauss    ALLERGIES: Latex    Past Medical History:   Diagnosis Date     Advanced open-angle glaucoma      Atrial fibrillation (H)      CKD (chronic kidney disease), stage III (H) 2005     Colon cancer (H)     Stage II-B colon cancer     Coronary artery disease     s/p CABG x 2, JEREMY x 2     Diverticulitis      Hyperlipidemia      Hypertension      Ischemic cardiomyopathy      MGUS (monoclonal gammopathy of unknown significance)      Nonsenile cataract     BE     Osteoporosis     left hip fracture     Polymorphic ventricular tachycardia      Polymyalgia rheumatica (H)      PVD (posterior vitreous detachment), both eyes 2005     S/P ablation of atrial flutter 6/20/14    CTI     Stented coronary artery      SVT (supraventricular tachycardia) (H)     PPM/AICD for NSVT     Upper leg DVT (deep venous thromboembolism), chronic (H)     Left "     Weight loss, unintentional 2017    15 lb in 4 months      Past Surgical History:   Procedure Laterality Date     CATARACT IOL, RT/LT Bilateral      COLONOSCOPY  3/13/2014    Procedure: COMBINED COLONOSCOPY, SINGLE BIOPSY/POLYPECTOMY BY BIOPSY;;  Surgeon: Mary Gerber MD;  Location:  GI     COLONOSCOPY N/A 6/22/2015    Procedure: COLONOSCOPY;  Surgeon: Marilin Newman MD;  Location:  GI     COLONOSCOPY N/A 11/7/2018    Procedure: COMBINED COLONOSCOPY, SINGLE OR MULTIPLE BIOPSY/POLYPECTOMY BY BIOPSY;  Surgeon: Roberto Esteban MD;  Location:  GI     EP ICD GENERATOR REPLACEMENT DUAL N/A 12/11/2018    Procedure: EP ICD Generator Change Dual;  Surgeon: Deedee Baird MD;  Location:  HEART CARDIAC CATH LAB     H ABLATION ATRIAL FLUTTER       HEART CATH DRUG ELUTING STENT PLACEMENT  4/19/2012    JEREMY x 2 to LCx     IMPLANT IMPLANTABLE CARDIOVERTER DEFIBRILLATOR  5-    ppm/aicd     KNEE SURGERY      right and left knee surgeries     LAPAROSCOPIC ASSISTED COLECTOMY Right 2/1/2019    Procedure: Laparoscopic Converted to Open Transverse Colectomy with Lysis of Adhesions;  Surgeon: Chanelle Guzmán MD;  Location:  OR     LAPAROSCOPIC ASSISTED COLECTOMY LEFT (DESCENDING)  4/8/2014    Procedure: LAPAROSCOPIC ASSISTED COLECTOMY LEFT (DESCENDING);  Hand assisted Laparoscopic Sigmoid Colectomy , Laparoscopic mobilization of spleenic flexure *Latex Allergy*Anesthesia General with Block;  Surgeon: Chanelle Guzmán MD;  Location:  OR     OPEN REDUCTION INTERNAL FIXATION RODDING INTRAMEDULLAR FEMUR FRACTURE TABLE Left 3/14/2019    Procedure: Open Reduction Internal Fixation Left Femur Intramedullar Nailing;  Surgeon: Srikanth Avalos MD;  Location:  OR     REPAIR VALVE MITRAL  2006     30-mm Medtronic Julian ring      SELECTIVE LASER TRABECULOPLASTY (SLT) OD (RIGHT EYE)  4/10, 1/12,+1/9/13    RE     SELECTIVE LASER TRABECULOPLASTY (SLT) OS (LEFT EYE)  5/2012      SHOULDER SURGERY      right rotator cuff     [facility] CABG, ARTERY-VEIN, FOUR      LIMA-LAD, SVG-Rt PDA, SVG-OM2, SVG-Diag 1     [facility] CABG, VEIN, SINGLE      1-vessel CABG, SVG->PDRCA      Family History   Problem Relation Age of Onset     C.A.D. Father      Social History     Tobacco Use     Smoking status: Former     Types: Cigarettes, Cigars     Quit date: 1968     Years since quittin.9     Smokeless tobacco: Never   Substance Use Topics     Alcohol use: Yes     Alcohol/week: 0.0 standard drinks     Comment: 2-3 drinks twice weekly     Drug use: No        Post-discharge medication reconciliation status: Reviewed and updated in [facility] according to facility MAR    Current Outpatient Medications   Medication Sig Dispense Refill     alendronate (FOSAMAX) 70 MG tablet Take 1 tablet (70 mg) by mouth every 7 days Take with a full glass of water and do not eat or lay down for 30 minutes 12 tablet 3     ARIPiprazole (ABILIFY) 2 MG tablet Take 2 mg by mouth daily       bacitracin 500 UNIT/GM ophthalmic ointment 1 application, topical, Once A Day, Apply to open areas on right great to and right second toe and cover with dressing daily (has pressure area to toes)       calcium carbonate 500 mg, elemental, (OSCAL;OYSTER SHELL CALCIUM) 500 MG tablet Take 1 tablet (500 mg) by mouth 2 times daily 180 tablet 3     cholecalciferol 1000 units TABS Take 1,000 Units by mouth daily        COMPRESSION STOCKINGS 1 each daily 1 each 4     cyanocobalamin (VITAMIN B-12) 1000 MCG tablet Take 4 daily 400 tablet 3     Emollient (CERAVE SA RENEWING) LOTN 1 application, topical, Once A Day, Apply to torso       Emollient (CERAVE) CREA 1 application, topical, Once A Day, Apply to bilateral legs       ferrous sulfate (FE TABS) 325 (65 Fe) MG EC tablet Take 325 mg by mouth daily       ketoconazole (NIZORAL) 2 % external cream Apply to affected areas on chest and feet twice daily as needed 60 g 6     ketoconazole (NIZORAL) 2 % external  "cream Apply to the feet 2 times a day until rash clears then 3-4 times a week for maintenance 60 g 11     ketoconazole (NIZORAL) 2 % external shampoo Apply to scalp daily as needed until flaking resolves 120 mL 1     metoprolol tartrate (LOPRESSOR) 25 MG tablet Take 1 tablet (25 mg) by mouth 2 times daily 180 tablet 3     mirtazapine (REMERON) 15 MG tablet Take 15 mg by mouth At Bedtime.       Multiple Vitamins-Minerals (MULTIVITAMIN ADULT PO)        sertraline (ZOLOFT) 100 MG tablet Take 150 mg by mouth every evening        tamsulosin (FLOMAX) 0.4 MG capsule TAKE 2 CAPSULES (0.8 MG) BY MOUTH DAILY 180 capsule 2     tiZANidine (ZANAFLEX) 2 MG tablet Take 1-2 tablets (2-4 mg) by mouth 3 times daily as needed for muscle spasms 30 tablet 0     urea (GORMEL) 20 % external cream Apply topically 2 times daily Apply to bilateral heels BID       warfarin ANTICOAGULANT (JANTOVEN ANTICOAGULANT) 5 MG tablet TAKE ONE TABLET BY MOUTH DAILY OR AS DIRECTED BY THE COUMADIN CLINIC 90 tablet 1       ROS:  Limited secondary to cognitive impairment but today pt reports as above in HPI    Exam:  /67   Pulse 61   Temp (!) 96.6  F (35.9  C)   Resp 18   Ht 1.702 m (5' 7\")   Wt 68 kg (150 lb)   SpO2 100%   BMI 23.49 kg/m    Alert, sitting up in WC watching TV and slowly eating a cookie and nutritional drink, frail appearing  No scleral icterus  Moist oral mucosa  Heart tones irregularly irregular rate controlled  Lungs clear  Abdomen soft  No edema with compression hose in place  Vague historian and tangential - talks about movies  Slightly forward bent posture but no overt tremor, slow speech with some word finding difficulty  R great toe and R 2nd toe ulcers noted (great toe with superficial skin breakdown and scant serosang drainage and 2nd toe ulcer is dried and nearly scabbed over)  Seems calm and not anxious at the time of my visit    Lab/Diagnostic data:  Basic labs checked a month ago  Vitamin D 58 in Nov 2021  INR " "today 1.8    ASSESSMENT/PLAN:  (R29.6) Recurrent falls  (primary encounter diagnosis)  (R53.81) Physical deconditioning  Slow progression of overall deconditioning with isolation of pandemic leading to frequent falls without overt injury at home  Here for aggressive physical therapy and occupational therapy with goal to return home with SO Rica who provides dedicated care  She also has the help of PCA in the mornings and is planning to hire another agency which will help her bring him out into the community for socialization, etc, to hopefully maintain his functioning for as long as possible once he returns home  However, in order to safely go home she needs him to be strong enough to be a one person assist with transfers  Current physical therapy report: \"*Assist of 1* CGA to EZ stand with transfers. Walking 100  with FWW and min A. Max A needed with dressing and toileting\".  Also, since basic labs last checked a month ago, Rica is in favor of CMP and CBC on Monday 11/28/22  Also discontinued PRN Tizanidine as she reports they don't use that at home    (R41.89) Cognitive impairment  (F22) Paranoia (H)  (F41.9,  F32.A) Anxiety and depression  He follows with outpatient psychiatrist Dr. Stanton for depression/anxiety  He is on Sertraline, Remeron and Abilify  Increased Sertraline today from 150 mg to 200 mg every evening as per Rica that is actually his home dose  He is calm today with noted cognitive impairment as described above but has had some evening anxiety/paranoia here at TCU according to Rica with scary delusions (long term, murder); she is hoping increasing the Sertraline will help, however, we also discussed his cognitive impairment and she is aware of this to a degree but there isn't any previous formal diagnosis for Bill in this regard  I discussed SLUMS 6/30 which is concerning for dementia but this will need to be followed over time and see if persistent as it likely is and will need to thus " provide more education to Rica in this regard to help in his overall care needs  Discussed reorientation and they are also going to avoid scary TV shows in the evening to see if this helps  Should he potentially need pharmacologic treatment of his evening paranoia/delusions she is planning to seek out his psychiatrist Dr. Stanton    (I48.91) Atrial fibrillation, unspecified type (H)  (Z79.01) Long-term (current) use of anticoagulants [Z79.01]  INR today 1.8 with goal 1.5-2.5 per outpatient clinic; dosing per my colleague and recheck next week    (L98.761) Skin ulcer, limited to breakdown of skin (H)  Improving per Rica; continue daily bandage changes and scant Bacitracin along with reduction of pressure to the area    (M81.0) Osteoporosis, unspecified osteoporosis type, unspecified pathological fracture presence  Is on Fosamax, Ca, D  Per Rica's request, I increased vitamin D to home dosing of 1000 units BID        Electronically signed by:  Renay Srinivasan DO

## 2022-11-27 ENCOUNTER — LAB REQUISITION (OUTPATIENT)
Dept: LAB | Facility: CLINIC | Age: 87
End: 2022-11-27
Payer: COMMERCIAL

## 2022-11-27 DIAGNOSIS — I48.20 CHRONIC ATRIAL FIBRILLATION, UNSPECIFIED (H): ICD-10-CM

## 2022-11-28 LAB
ALBUMIN SERPL BCG-MCNC: 3.8 G/DL (ref 3.5–5.2)
ALP SERPL-CCNC: 112 U/L (ref 40–129)
ALT SERPL W P-5'-P-CCNC: 18 U/L (ref 10–50)
ANION GAP SERPL CALCULATED.3IONS-SCNC: 12 MMOL/L (ref 7–15)
AST SERPL W P-5'-P-CCNC: 37 U/L (ref 10–50)
BILIRUB SERPL-MCNC: 0.2 MG/DL
BUN SERPL-MCNC: 45.6 MG/DL (ref 8–23)
CALCIUM SERPL-MCNC: 9 MG/DL (ref 8.8–10.2)
CHLORIDE SERPL-SCNC: 110 MMOL/L (ref 98–107)
CREAT SERPL-MCNC: 1.57 MG/DL (ref 0.67–1.17)
DEPRECATED HCO3 PLAS-SCNC: 21 MMOL/L (ref 22–29)
ERYTHROCYTE [DISTWIDTH] IN BLOOD BY AUTOMATED COUNT: 16 % (ref 10–15)
GFR SERPL CREATININE-BSD FRML MDRD: 42 ML/MIN/1.73M2
GLUCOSE SERPL-MCNC: 98 MG/DL (ref 70–99)
HCT VFR BLD AUTO: 27.5 % (ref 40–53)
HGB BLD-MCNC: 8.6 G/DL (ref 13.3–17.7)
MCH RBC QN AUTO: 30.2 PG (ref 26.5–33)
MCHC RBC AUTO-ENTMCNC: 31.3 G/DL (ref 31.5–36.5)
MCV RBC AUTO: 97 FL (ref 78–100)
PLATELET # BLD AUTO: 182 10E3/UL (ref 150–450)
POTASSIUM SERPL-SCNC: 4.6 MMOL/L (ref 3.4–5.3)
PROT SERPL-MCNC: 6.6 G/DL (ref 6.4–8.3)
RBC # BLD AUTO: 2.85 10E6/UL (ref 4.4–5.9)
SODIUM SERPL-SCNC: 143 MMOL/L (ref 136–145)
WBC # BLD AUTO: 8.2 10E3/UL (ref 4–11)

## 2022-11-28 PROCEDURE — 80053 COMPREHEN METABOLIC PANEL: CPT | Mod: ORL | Performed by: INTERNAL MEDICINE

## 2022-11-28 PROCEDURE — P9604 ONE-WAY ALLOW PRORATED TRIP: HCPCS | Mod: ORL | Performed by: INTERNAL MEDICINE

## 2022-11-28 PROCEDURE — 85027 COMPLETE CBC AUTOMATED: CPT | Mod: ORL | Performed by: INTERNAL MEDICINE

## 2022-11-28 PROCEDURE — 36415 COLL VENOUS BLD VENIPUNCTURE: CPT | Mod: ORL | Performed by: INTERNAL MEDICINE

## 2022-12-01 ENCOUNTER — TRANSITIONAL CARE UNIT VISIT (OUTPATIENT)
Dept: GERIATRICS | Facility: CLINIC | Age: 87
End: 2022-12-01
Payer: COMMERCIAL

## 2022-12-01 VITALS
BODY MASS INDEX: 23.24 KG/M2 | HEART RATE: 71 BPM | DIASTOLIC BLOOD PRESSURE: 64 MMHG | OXYGEN SATURATION: 98 % | SYSTOLIC BLOOD PRESSURE: 120 MMHG | RESPIRATION RATE: 18 BRPM | WEIGHT: 148.4 LBS | TEMPERATURE: 96.8 F

## 2022-12-01 DIAGNOSIS — F41.9 ANXIETY AND DEPRESSION: ICD-10-CM

## 2022-12-01 DIAGNOSIS — F32.A ANXIETY AND DEPRESSION: ICD-10-CM

## 2022-12-01 DIAGNOSIS — R53.81 PHYSICAL DECONDITIONING: Primary | ICD-10-CM

## 2022-12-01 DIAGNOSIS — M35.3 POLYMYALGIA RHEUMATICA (H): ICD-10-CM

## 2022-12-01 DIAGNOSIS — M81.0 OSTEOPOROSIS, UNSPECIFIED OSTEOPOROSIS TYPE, UNSPECIFIED PATHOLOGICAL FRACTURE PRESENCE: ICD-10-CM

## 2022-12-01 LAB — INR (EXTERNAL): 2.1 (ref 0.9–1.1)

## 2022-12-01 PROCEDURE — 99309 SBSQ NF CARE MODERATE MDM 30: CPT | Performed by: NURSE PRACTITIONER

## 2022-12-01 NOTE — LETTER
12/1/2022        RE: Noe Florence  [address] [facility] [address]        [facility] GERIATRICS  Chief Complaint   Patient presents with     RECHECK     HPI:  Noe Florence is a 87 year old  (1935), who is being seen today for an episodic care visit at: [facility] ([facility]) [[id number]].     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    Today's concern is:     Atrial Fibrillation on Anticoagulation, History of Recurrent DVT, and History of V. Tach S/P ICD Placement. Today, INR 2.1. Previous INR and Warfarin dosing has been as follows:     Date INR New Dose/Orders   11/16.22 1.6 Warfarin 2.5 mg on Tu, Th, Sun and 5 mg PO QOD   11/18/22 1.6 Warfarin 5 mg PO every day    11/21/22 2.4 Warfarin 2.5 mg PO on Tu, Th, Sun and 5 mg PO AOD   11/25/22 1.8 Warfarin 2.5 mg PO on Tu, Th, Sun and 5 mg PO AOD   12/1/22 2.1 Warfarin 2.5 mg PO on Tu, Th, Sun and 5 mg PO AOD      Osteoporosis. Significant other reports patient has bone on bone in his left knee. Has pain with certain activities. Takes Tylenol and Ibuprofen at home. Typically takes in the morning and at night.    Depression. Significant other reports since patient has been on increased dose of Sertraline, has had less distressing hallucinations. Has an appointment coming up with his Psychiatrist.    Allergies, and PMH/PSH reviewed in [facility] today.  REVIEW OF SYSTEMS:  4 point ROS including Respiratory, CV, GI and , other than that noted in the HPI,  is negative    Objective:   /64    Pulse 71   Temp 96.8  F (36  C)   Resp 18   Wt 67.3 kg (148 lb 6.4 oz)   SpO2 98%   BMI 23.24 kg/m    GENERAL APPEARANCE:  Alert, in no distress  ENT:  Mouth and posterior oropharynx normal, moist mucous membranes  EYES:  EOM, conjunctivae, lids, pupils and irises normal  RESP:  respiratory effort and palpation of chest normal, lungs clear to auscultation , no respiratory distress  CV:  Palpation and auscultation of heart done , regular rate and rhythm, no murmur, rub, or gallop  ABDOMEN:  normal bowel sounds, soft, nontender, no hepatosplenomegaly or other masses  M/S:   Compression stockings on BLE  SKIN:  Ecchymosis on BUE  NEURO:   Cranial nerves 2-12 are normal tested and grossly at patient's baseline  PSYCH:  oriented to person    Labs done in SNF are in [facility]. Please refer to them using [employer] Everywhere.    Assessment/Plan:  Recurrent Falls. With legs giving out per patient and significant other's report. Physical and Occupational Therapy ordered. Goal is to get stronger and return home.     Neuropathy, Chronic Venous Insufficiency, Bilateral Lower Extremity Edema, and Left Foot Drop. Related to above. Physical and Occupational Therapy ordered. Takes Tizanidine for muscle spasms.     Atrial Fibrillation on Anticoagulation, History of Recurrent DVT, and History of V. Tach S/P ICD Placement. INR goal 1.5-2.5 due to history of falls and bleed. Most recent home dose per [facility] Warfarin 2.5 mg on Tu, Th, Sun and 5 mg all other days. Will continue home dose with repeat INR in 1 week. Takes Metoprolol. Has had intermittent episodes of mild bradycardia since TCU admission. Continue to monitor heart rate.     Coronary Artery Disease S/P CABG x 2 with JEREMY, Hypertension, and Hyperlipidemia. Followed by [facility]. Taken off Aspirin due to recurrent falls. No longer on Atorvastatin due to concern for myalgias. Most blood pressures < 140/90 over the past 5 days. Takes  Metoprolol.     Cognitive Impairment. Based on cognitive testing by Occupational Therapy. Lived at home with significant other prior to admission.  to assist with appropriate discharge disposition.       Anemia. Baseline Hemoglobin ~ 10-11. Last Hemoglobin 8.6 on 11/28/22. Takes Ferrous Sulfate. Will repeat labs on 12/12/22 to ensure stability     Chronic Kidney Disease Stage III. Baseline Creatinine mid 1s. Last Creatinine 1.57 on 11/28/22. Monitor periodically.     Depression. Significant other reports patient is followed by a psychiatrist. Taking Aripiprazole, Mirtazapine, and Sertraline.     Osteoporosis. Given reports of pain, will schedule Acetaminophen BID as well as PRN. Educated SO on risks of Ibuprofen. Continue Alendronate and Cholecalciferol as ordered.     Urinary Incontinence. Continue Tamsulosin as ordered.     History of Colon Cancer S/P Laparoscopic Resection 2019. Followed by Oncology. Last seen 10/3/22.     Elevated MMA. On high dose Vitamin B12 supplementation.      Tinea Pedis and Stasis Dermatitis. Has various lotions ordered for bilateral lower extremities and feet.     Polymyalgia Rheumatica. Noted in history. Requires no medication.      Physical Deconditioning. Physical and Occupational Therapy ordered.    Orders:  Acetaminophen 1000 mg PO BID and every day PRN  Warfarin 2.5 mg PO on Tu, Th, Sun and 5 mg PO AOD  INR on 12/8/22  CBC on 12/12/22    Electronically signed by: MARIA LUISA Morataya CNP             Sincerely,        MARIA LUISA Morataya CNP

## 2022-12-01 NOTE — PROGRESS NOTES
[facility] GERIATRICS  Chief Complaint   Patient presents with     RECHECK     HPI:  Noe Florence is a 87 year old  (1935), who is being seen today for an episodic care visit at: [facility] ([facility]) [[id number]].     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    Today's concern is:     Atrial Fibrillation on Anticoagulation, History of Recurrent DVT, and History of V. Tach S/P ICD Placement. Today, INR 2.1. Previous INR and Warfarin dosing has been as follows:     Date INR New Dose/Orders   11/16.22 1.6 Warfarin 2.5 mg on Tu, Th, Sun and 5 mg PO QOD   11/18/22 1.6 Warfarin 5 mg PO every day    11/21/22 2.4 Warfarin 2.5 mg PO on Tu, Th, Sun and 5 mg PO AOD   11/25/22 1.8 Warfarin 2.5 mg PO on Tu, Th, Sun and 5 mg PO AOD   12/1/22 2.1 Warfarin 2.5 mg PO on Tu, Th, Sun and 5 mg PO AOD      Osteoporosis. Significant other reports patient has bone on bone in his left knee. Has pain with certain activities. Takes Tylenol and Ibuprofen at home. Typically takes in the morning and at night.    Depression. Significant other reports since patient has been on increased dose of Sertraline, has had less distressing hallucinations. Has an appointment coming up with his Psychiatrist.    Allergies, and PMH/PSH reviewed in [facility] today.  REVIEW OF SYSTEMS:  4 point ROS including Respiratory, CV, GI and , other than that noted in the HPI,  is negative    Objective:   /64   Pulse 71   Temp 96.8  F (36  C)   Resp 18   Wt 67.3 kg (148 lb 6.4 oz)   SpO2 98%    BMI 23.24 kg/m    GENERAL APPEARANCE:  Alert, in no distress  ENT:  Mouth and posterior oropharynx normal, moist mucous membranes  EYES:  EOM, conjunctivae, lids, pupils and irises normal  RESP:  respiratory effort and palpation of chest normal, lungs clear to auscultation , no respiratory distress  CV:  Palpation and auscultation of heart done , regular rate and rhythm, no murmur, rub, or gallop  ABDOMEN:  normal bowel sounds, soft, nontender, no hepatosplenomegaly or other masses  M/S:   Compression stockings on BLE  SKIN:  Ecchymosis on BUE  NEURO:   Cranial nerves 2-12 are normal tested and grossly at patient's baseline  PSYCH:  oriented to person    Labs done in SNF are in [facility]. Please refer to them using [employer] Everywhere.    Assessment/Plan:  Recurrent Falls. With legs giving out per patient and significant other's report. Physical and Occupational Therapy ordered. Goal is to get stronger and return home.     Neuropathy, Chronic Venous Insufficiency, Bilateral Lower Extremity Edema, and Left Foot Drop. Related to above. Physical and Occupational Therapy ordered. Takes Tizanidine for muscle spasms.     Atrial Fibrillation on Anticoagulation, History of Recurrent DVT, and History of V. Tach S/P ICD Placement. INR goal 1.5-2.5 due to history of falls and bleed. Most recent home dose per [facility] Warfarin 2.5 mg on Tu, Th, Sun and 5 mg all other days. Will continue home dose with repeat INR in 1 week. Takes Metoprolol. Has had intermittent episodes of mild bradycardia since TCU admission. Continue to monitor heart rate.     Coronary Artery Disease S/P CABG x 2 with JEREMY, Hypertension, and Hyperlipidemia. Followed by [facility]. Taken off Aspirin due to recurrent falls. No longer on Atorvastatin due to concern for myalgias. Most blood pressures < 140/90 over the past 5 days. Takes Metoprolol.     Cognitive Impairment. Based on cognitive testing by Occupational Therapy. Lived at  home with significant other prior to admission.  to assist with appropriate discharge disposition.       Anemia. Baseline Hemoglobin ~ 10-11. Last Hemoglobin 8.6 on 11/28/22. Takes Ferrous Sulfate. Will repeat labs on 12/12/22 to ensure stability     Chronic Kidney Disease Stage III. Baseline Creatinine mid 1s. Last Creatinine 1.57 on 11/28/22. Monitor periodically.     Depression. Significant other reports patient is followed by a psychiatrist. Taking Aripiprazole, Mirtazapine, and Sertraline.     Osteoporosis. Given reports of pain, will schedule Acetaminophen BID as well as PRN. Educated SO on risks of Ibuprofen. Continue Alendronate and Cholecalciferol as ordered.     Urinary Incontinence. Continue Tamsulosin as ordered.     History of Colon Cancer S/P Laparoscopic Resection 2019. Followed by Oncology. Last seen 10/3/22.     Elevated MMA. On high dose Vitamin B12 supplementation.      Tinea Pedis and Stasis Dermatitis. Has various lotions ordered for bilateral lower extremities and feet.     Polymyalgia Rheumatica. Noted in history. Requires no medication.      Physical Deconditioning. Physical and Occupational Therapy ordered.    Orders:  Acetaminophen 1000 mg PO BID and every day PRN  Warfarin 2.5 mg PO on Tu, Th, Sun and 5 mg PO AOD  INR on 12/8/22  CBC on 12/12/22    Electronically signed by: MARIA LUISA Morataya CNP

## 2022-12-05 ENCOUNTER — VIRTUAL VISIT (OUTPATIENT)
Dept: CARDIOLOGY | Facility: CLINIC | Age: 87
End: 2022-12-05
Payer: COMMERCIAL

## 2022-12-05 DIAGNOSIS — I87.2 CHRONIC VENOUS INSUFFICIENCY: ICD-10-CM

## 2022-12-05 DIAGNOSIS — I48.0 PAROXYSMAL ATRIAL FIBRILLATION (H): Primary | ICD-10-CM

## 2022-12-05 PROCEDURE — 99441 PR PHYSICIAN TELEPHONE EVALUATION 5-10 MIN: CPT | Mod: 95 | Performed by: INTERNAL MEDICINE

## 2022-12-05 RX ORDER — ARIPIPRAZOLE 5 MG/1
5 TABLET ORAL DAILY
COMMUNITY
End: 2022-12-27

## 2022-12-05 RX ORDER — ACETAMINOPHEN 500 MG
1000 TABLET ORAL 2 TIMES DAILY
Status: ON HOLD | COMMUNITY
End: 2024-01-01

## 2022-12-05 RX ORDER — KETOCONAZOLE 20 MG/G
CREAM TOPICAL
Qty: 60 G | Refills: 6 | COMMUNITY
Start: 2022-12-05 | End: 2023-01-01

## 2022-12-05 NOTE — LETTER
"12/5/2022      RE: Noe Florence  [address] [facility] [address]       Dear Colleague,    Thank you for the opportunity to participate in the care of your patient, Noe Florence, at the [facility] HEART CLINIC [facility]. Please see a copy of my visit note below.    Electrophysiology Clinic Telephone Visit    Noe Florence is a 87 year old male who is being evaluated via a billable telephone visit.      The patient has been notified of following:     \"This telephone visit will be conducted via a call between you and your physician/provider. We have found that certain health care needs can be provided without the need for a physical exam.  This service lets us provide the care you need with a short phone conversation.  If a prescription is necessary we can send it directly to your pharmacy.  If lab work is needed we can place an order for that and you can then stop by our lab to have the test done at a later time.    If during the course of the call the physician/provider feels a telephone visit is not appropriate, you will not be charged for this service.\"   Patient has given verbal consent for Telephone visit?  Yes    HPI:   Noe Florence is an 88yo M with a past medical history significant for  HTN, HLD, CKD3, CAD s/p CABG x2, DESx2, polymorphic VT s/p ICD placement (5/2012, gen change 12/2018), and AF/AFL s/p CTI (6/2014) and right atrial appendage atrial tachycardia ablation (9/2014). His ICD reached REBECCA and he underwent an ICD gen change on 12/11/2018. He presents for routine 6 month follow up.     He is actually admitted to a TCU currently for weakness. He had multiple falls at home that were mechanical in nature and related to his underlying weakness. He denies any syncope or loss of consciousness. Denies any palpitations or heart race. No chest pain or dyspnea. He continues on his warfarin despite the falls.     PAST MEDICAL " HISTORY:  Past Medical History:   Diagnosis Date     Advanced open-angle glaucoma      Atrial fibrillation (H)      CKD (chronic kidney disease), stage III (H) 2005     Colon cancer (H)     Stage II-B colon cancer     Coronary artery disease     s/p CABG x 2, JEREMY x 2     Diverticulitis      Hyperlipidemia      Hypertension      Ischemic cardiomyopathy      MGUS (monoclonal gammopathy of unknown significance)      Nonsenile cataract     BE     Osteoporosis     left hip fracture     Polymorphic ventricular tachycardia      Polymyalgia rheumatica (H)      PVD (posterior vitreous detachment), both eyes 2005     S/P ablation of atrial flutter 6/20/14    CTI     Stented coronary artery      SVT (supraventricular tachycardia) (H)     PPM/AICD for NSVT     Upper leg DVT (deep venous thromboembolism), chronic (H)     Left     Weight loss, unintentional 2017    15 lb in 4 months       CURRENT MEDICATIONS:  Current Outpatient Medications   Medication Sig Dispense Refill     alendronate (FOSAMAX) 70 MG tablet Take 1 tablet (70 mg) by mouth every 7 days Take with a full glass of water and do not eat or lay down for 30 minutes 12 tablet 3     ARIPiprazole (ABILIFY) 2 MG tablet Take 2 mg by mouth daily       bacitracin 500 UNIT/GM ophthalmic ointment 1 application, topical, Once A Day, Apply to open areas on right great to and right second toe and cover with dressing daily (has pressure area to toes)       calcium carbonate 500 mg, elemental, (OSCAL;OYSTER SHELL CALCIUM) 500 MG tablet Take 1 tablet (500 mg) by mouth 2 times daily 180 tablet 3     cholecalciferol 1000 units TABS Take 1,000 Units by mouth 2 times daily       COMPRESSION STOCKINGS 1 each daily 1 each 4     cyanocobalamin (VITAMIN B-12) 1000 MCG tablet Take 4 daily 400 tablet 3     Emollient (CERAVE SA RENEWING) LOTN 1 application, topical, Once A Day, Apply to torso       Emollient (CERAVE) CREA 1 application, topical, Once A Day, Apply to bilateral legs        ferrous sulfate (FE TABS) 325 (65 Fe) MG EC tablet Take 325 mg by mouth daily       ketoconazole (NIZORAL) 2 % external cream Apply to affected areas on chest and feet twice daily as needed 60 g 6     ketoconazole (NIZORAL) 2 % external cream Apply to the feet 2 times a day until rash clears then 3-4 times a week for maintenance 60 g 11     ketoconazole (NIZORAL) 2 % external shampoo Apply to scalp daily as needed until flaking resolves 120 mL 1     metoprolol tartrate (LOPRESSOR) 25 MG tablet Take 1 tablet (25 mg) by mouth 2 times daily 180 tablet 3     mirtazapine (REMERON) 15 MG tablet Take 15 mg by mouth At Bedtime.       Multiple Vitamins-Minerals (MULTIVITAMIN ADULT PO)        sertraline (ZOLOFT) 100 MG tablet Take 200 mg by mouth every evening       tamsulosin (FLOMAX) 0.4 MG capsule TAKE 2 CAPSULES (0.8 MG) BY MOUTH DAILY 180 capsule 2     urea (GORMEL) 20 % external cream Apply topically 2 times daily Apply to bilateral heels BID       warfarin ANTICOAGULANT (JANTOVEN ANTICOAGULANT) 5 MG tablet TAKE ONE TABLET BY MOUTH DAILY OR AS DIRECTED BY THE COUMADIN CLINIC 90 tablet 1       PAST SURGICAL HISTORY:  Past Surgical History:   Procedure Laterality Date     CATARACT IOL, RT/LT Bilateral      COLONOSCOPY  3/13/2014    Procedure: COMBINED COLONOSCOPY, SINGLE BIOPSY/POLYPECTOMY BY BIOPSY;;  Surgeon: Mary Gerber MD;  Location:  GI     COLONOSCOPY N/A 6/22/2015    Procedure: COLONOSCOPY;  Surgeon: Marilin Newman MD;  Location:  GI     COLONOSCOPY N/A 11/7/2018    Procedure: COMBINED COLONOSCOPY, SINGLE OR MULTIPLE BIOPSY/POLYPECTOMY BY BIOPSY;  Surgeon: Roberto Esteban MD;  Location:  GI     EP ICD GENERATOR REPLACEMENT DUAL N/A 12/11/2018    Procedure: EP ICD Generator Change Dual;  Surgeon: Deedee Baird MD;  Location:  HEART CARDIAC CATH LAB     H ABLATION ATRIAL FLUTTER       HEART CATH DRUG ELUTING STENT PLACEMENT  4/19/2012    JEREMY x 2 to LCx     IMPLANT IMPLANTABLE  CARDIOVERTER DEFIBRILLATOR  2012    ppm/aicd     KNEE SURGERY      right and left knee surgeries     LAPAROSCOPIC ASSISTED COLECTOMY Right 2019    Procedure: Laparoscopic Converted to Open Transverse Colectomy with Lysis of Adhesions;  Surgeon: Chanelle Guzmán MD;  Location: [facility] OR     LAPAROSCOPIC ASSISTED COLECTOMY LEFT (DESCENDING)  2014    Procedure: LAPAROSCOPIC ASSISTED COLECTOMY LEFT (DESCENDING);  Hand assisted Laparoscopic Sigmoid Colectomy , Laparoscopic mobilization of spleenic flexure *Latex Allergy*Anesthesia General with Block;  Surgeon: Chanelle Guzmán MD;  Location: [facility] OR     OPEN REDUCTION INTERNAL FIXATION RODDING INTRAMEDULLAR FEMUR FRACTURE TABLE Left 3/14/2019    Procedure: Open Reduction Internal Fixation Left Femur Intramedullar Nailing;  Surgeon: Srikanth Avalos MD;  Location: [facility] OR     REPAIR VALVE MITRAL  2006     30-mm Medtronic Julian ring      SELECTIVE LASER TRABECULOPLASTY (SLT) OD (RIGHT EYE)  4/10, ,+13    RE     SELECTIVE LASER TRABECULOPLASTY (SLT) OS (LEFT EYE)  2012     SHOULDER SURGERY      right rotator cuff     ZZC CABG, ARTERY-VEIN, FOUR      LIMA-LAD, SVG-Rt PDA, SVG-OM2, SVG-Diag 1     ZZC CABG, VEIN, SINGLE      1-vessel CABG, SVG->PDRCA        ALLERGIES:     Allergies   Allergen Reactions     Latex Rash     Rash       FAMILY HISTORY:  Family History   Problem Relation Age of Onset     C.A.D. Father      Anesthesia Reaction No family hx of      Crohn's Disease No family hx of      Ulcerative Colitis No family hx of      Cancer - colorectal No family hx of      Macular Degeneration No family hx of      Cancer No family hx of         No known family hx of skin cancer     Melanoma No family hx of      Skin Cancer No family hx of        SOCIAL HISTORY:  Social History     Tobacco Use     Smoking status: Former     Types: Cigarettes, Cigars     Quit date: 1968     Years since quittin.9     Smokeless tobacco:  Never   Substance Use Topics     Alcohol use: Yes     Alcohol/week: 0.0 standard drinks     Comment: 2-3 drinks twice weekly     Drug use: No       ROS:  10 point ROS neg other than the symptoms noted above in the HPI.    Labs:  Reviewed.     Testing/Procedures:  ICD Interrogation  Device: Medtronic [id number] Evera MRI XT   Normal Device Function.  Mode: AAIR-DDDR  bpm  AP: 68.2%  : 48.9%  Presenting EGM: AS/VS @ 72 bpm  Thoracic Impedance: Near baseline.   Short V-V intervals: 0  Lead Trends Appear Stable.   Estimated battery longevity to RRT = 5.2 years. Battery voltage = 2.98 V.   Atrial Arrhythmia: 2 AT lasting up to 52 minutes  AF [place]: <0.1%  Anticoagulant: Warfarin  Ventricular Arrhythmia: None  Pt Notified of Transmission Results: Plairt    Assessment and Plan:   Paroxysmal atrial fibrillation/flutter s/p CTI 2014 and focal AT ablation 2014  Hx of VT status post ICD  CAD s/p CABG and PCI    He has recurrent falls but they appear to be related to weakness rather than arrhythmia. Currently he has no cardiac symptoms. Very low a-fib burden based on his ICD, with possibly recurrent AT. Given his age, co-morbidities and lack of symptoms, there is no indication to treat his rare episodes of AT. We will continue with his current dose of metoprolol and warfarin for stroke ppx. If falls continue to be an issue may need to discuss alternative stroke ppx options such as a Watchman device. We will plan to see him again as a virtual visit in 6 months. Additionally he used to follow with Dr. Santos for peripheral vascular disease and venous insufficiency and they are requesting a vascular medicine referral which we will place for him on this visit.       Telephone Visit Duration: 7 minutes    I have interviewed patient. I have reviewed the laboratory tests, imaging, and other investigations. I have reviewed the management plan with the patient. I discussed with the team and agree with the findings and plan in  this resident/fellow/nurse practitioner's note. In addition, changes in the assessment and plan have been incorporated into the note by myself, as to make it a single cohesive document.       Deedee Baird MD, MS  Cardiology/Cardiac EP Attending Staff

## 2022-12-05 NOTE — NURSING NOTE
Sha is a 87 year old who is being evaluated via a billable Telephone visit.      How would you like to obtain your AVS? Mailed letter  Please call [phone] (Room phone at [facility])  Will anyone else be joining your telephone visit? No      Telephone-Visit Details      Originating Location (pt. Location): [facility]     Distant Location (provider location):  Off-site    Chest Pain/Tightness/Pressure: None  SOB or JIANG: None  Heart Palpitations or fluttering: None  Lightheadedness or Dizziness: None  Stamina: Unable to exercise due to reduced mobility.    Blood Pressure: 101/70 mm/Hg  Pulse: unknown bpm  Weight: 152 lbs  Height: 5 ft 7 in      Marj Buck CMA ([facility])

## 2022-12-05 NOTE — PROGRESS NOTES
"Electrophysiology Clinic Telephone Visit    Noe Florence is a 87 year old male who is being evaluated via a billable telephone visit.      The patient has been notified of following:     \"This telephone visit will be conducted via a call between you and your physician/provider. We have found that certain health care needs can be provided without the need for a physical exam.  This service lets us provide the care you need with a short phone conversation.  If a prescription is necessary we can send it directly to your pharmacy.  If lab work is needed we can place an order for that and you can then stop by our lab to have the test done at a later time.    If during the course of the call the physician/provider feels a telephone visit is not appropriate, you will not be charged for this service.\"   Patient has given verbal consent for Telephone visit?  Yes    HPI:   Noe Florence is an 86yo M with a past medical history significant for  HTN, HLD, CKD3, CAD s/p CABG x2, DESx2, polymorphic VT s/p ICD placement (5/2012, gen change 12/2018), and AF/AFL s/p CTI (6/2014) and right atrial appendage atrial tachycardia ablation (9/2014). His ICD reached REBECCA and he underwent an ICD gen change on 12/11/2018. He presents for routine 6 month follow up.     He is actually admitted to a TCU currently for weakness. He had multiple falls at home that were mechanical in nature and related to his underlying weakness. He denies any syncope or loss of consciousness. Denies any palpitations or heart race. No chest pain or dyspnea. He continues on his warfarin despite the falls.     PAST MEDICAL HISTORY:  Past Medical History:   Diagnosis Date     Advanced open-angle glaucoma      Atrial fibrillation (H)      CKD (chronic kidney disease), stage III (H) 2005     Colon cancer (H)     Stage II-B colon cancer     Coronary artery disease     s/p CABG x 2, JEREMY x 2     Diverticulitis      Hyperlipidemia      Hypertension      Ischemic " cardiomyopathy      MGUS (monoclonal gammopathy of unknown significance)      Nonsenile cataract     BE     Osteoporosis     left hip fracture     Polymorphic ventricular tachycardia      Polymyalgia rheumatica (H)      PVD (posterior vitreous detachment), both eyes 2005     S/P ablation of atrial flutter 6/20/14    CTI     Stented coronary artery      SVT (supraventricular tachycardia) (H)     PPM/AICD for NSVT     Upper leg DVT (deep venous thromboembolism), chronic (H)     Left     Weight loss, unintentional 2017    15 lb in 4 months       CURRENT MEDICATIONS:  Current Outpatient Medications   Medication Sig Dispense Refill     alendronate (FOSAMAX) 70 MG tablet Take 1 tablet (70 mg) by mouth every 7 days Take with a full glass of water and do not eat or lay down for 30 minutes 12 tablet 3     ARIPiprazole (ABILIFY) 2 MG tablet Take 2 mg by mouth daily       bacitracin 500 UNIT/GM ophthalmic ointment 1 application, topical, Once A Day, Apply to open areas on right great to and right second toe and cover with dressing daily (has pressure area to toes)       calcium carbonate 500 mg, elemental, (OSCAL;OYSTER SHELL CALCIUM) 500 MG tablet Take 1 tablet (500 mg) by mouth 2 times daily 180 tablet 3     cholecalciferol 1000 units TABS Take 1,000 Units by mouth 2 times daily       COMPRESSION STOCKINGS 1 each daily 1 each 4     cyanocobalamin (VITAMIN B-12) 1000 MCG tablet Take 4 daily 400 tablet 3     Emollient (CERAVE SA RENEWING) LOTN 1 application, topical, Once A Day, Apply to torso       Emollient (CERAVE) CREA 1 application, topical, Once A Day, Apply to bilateral legs       ferrous sulfate (FE TABS) 325 (65 Fe) MG EC tablet Take 325 mg by mouth daily       ketoconazole (NIZORAL) 2 % external cream Apply to affected areas on chest and feet twice daily as needed 60 g 6     ketoconazole (NIZORAL) 2 % external cream Apply to the feet 2 times a day until rash clears then 3-4 times a week for maintenance 60 g 11      ketoconazole (NIZORAL) 2 % external shampoo Apply to scalp daily as needed until flaking resolves 120 mL 1     metoprolol tartrate (LOPRESSOR) 25 MG tablet Take 1 tablet (25 mg) by mouth 2 times daily 180 tablet 3     mirtazapine (REMERON) 15 MG tablet Take 15 mg by mouth At Bedtime.       Multiple Vitamins-Minerals (MULTIVITAMIN ADULT PO)        sertraline (ZOLOFT) 100 MG tablet Take 200 mg by mouth every evening       tamsulosin (FLOMAX) 0.4 MG capsule TAKE 2 CAPSULES (0.8 MG) BY MOUTH DAILY 180 capsule 2     urea (GORMEL) 20 % external cream Apply topically 2 times daily Apply to bilateral heels BID       warfarin ANTICOAGULANT (JANTOVEN ANTICOAGULANT) 5 MG tablet TAKE ONE TABLET BY MOUTH DAILY OR AS DIRECTED BY THE COUMADIN CLINIC 90 tablet 1       PAST SURGICAL HISTORY:  Past Surgical History:   Procedure Laterality Date     CATARACT IOL, RT/LT Bilateral      COLONOSCOPY  3/13/2014    Procedure: COMBINED COLONOSCOPY, SINGLE BIOPSY/POLYPECTOMY BY BIOPSY;;  Surgeon: Mary Gerber MD;  Location:  GI     COLONOSCOPY N/A 6/22/2015    Procedure: COLONOSCOPY;  Surgeon: Marilin Newman MD;  Location:  GI     COLONOSCOPY N/A 11/7/2018    Procedure: COMBINED COLONOSCOPY, SINGLE OR MULTIPLE BIOPSY/POLYPECTOMY BY BIOPSY;  Surgeon: Roberto Esteban MD;  Location:  GI     EP ICD GENERATOR REPLACEMENT DUAL N/A 12/11/2018    Procedure: EP ICD Generator Change Dual;  Surgeon: Deedee Baird MD;  Location:  HEART CARDIAC CATH LAB     H ABLATION ATRIAL FLUTTER       HEART CATH DRUG ELUTING STENT PLACEMENT  4/19/2012    JEREMY x 2 to LCx     IMPLANT IMPLANTABLE CARDIOVERTER DEFIBRILLATOR  5-    ppm/aicd     KNEE SURGERY      right and left knee surgeries     LAPAROSCOPIC ASSISTED COLECTOMY Right 2/1/2019    Procedure: Laparoscopic Converted to Open Transverse Colectomy with Lysis of Adhesions;  Surgeon: Chanelle Guzmán MD;  Location:  OR     LAPAROSCOPIC ASSISTED COLECTOMY LEFT  (DESCENDING)  2014    Procedure: LAPAROSCOPIC ASSISTED COLECTOMY LEFT (DESCENDING);  Hand assisted Laparoscopic Sigmoid Colectomy , Laparoscopic mobilization of spleenic flexure *Latex Allergy*Anesthesia General with Block;  Surgeon: Chanelle Guzmán MD;  Location: [facility] OR     OPEN REDUCTION INTERNAL FIXATION RODDING INTRAMEDULLAR FEMUR FRACTURE TABLE Left 3/14/2019    Procedure: Open Reduction Internal Fixation Left Femur Intramedullar Nailing;  Surgeon: Srikanth Avalos MD;  Location: [facility] OR     REPAIR VALVE MITRAL       30-mm Medtronic Julian ring      SELECTIVE LASER TRABECULOPLASTY (SLT) OD (RIGHT EYE)  4/10, ,+13    RE     SELECTIVE LASER TRABECULOPLASTY (SLT) OS (LEFT EYE)  2012     SHOULDER SURGERY      right rotator cuff     ZZC CABG, ARTERY-VEIN, FOUR      LIMA-LAD, SVG-Rt PDA, SVG-OM2, SVG-Diag 1     ZZC CABG, VEIN, SINGLE      1-vessel CABG, SVG->PDRCA        ALLERGIES:     Allergies   Allergen Reactions     Latex Rash     Rash       FAMILY HISTORY:  Family History   Problem Relation Age of Onset     C.A.D. Father      Anesthesia Reaction No family hx of      Crohn's Disease No family hx of      Ulcerative Colitis No family hx of      Cancer - colorectal No family hx of      Macular Degeneration No family hx of      Cancer No family hx of         No known family hx of skin cancer     Melanoma No family hx of      Skin Cancer No family hx of        SOCIAL HISTORY:  Social History     Tobacco Use     Smoking status: Former     Types: Cigarettes, Cigars     Quit date: 1968     Years since quittin.9     Smokeless tobacco: Never   Substance Use Topics     Alcohol use: Yes     Alcohol/week: 0.0 standard drinks     Comment: 2-3 drinks twice weekly     Drug use: No       ROS:  10 point ROS neg other than the symptoms noted above in the HPI.    Labs:  Reviewed.     Testing/Procedures:  ICD Interrogation  Device: [employer] [id number] Evera MRI XT DR  Normal Device  Function.  Mode: AAIR-DDDR  bpm  AP: 68.2%  : 48.9%  Presenting EGM: AS/VS @ 72 bpm  Thoracic Impedance: Near baseline.   Short V-V intervals: 0  Lead Trends Appear Stable.   Estimated battery longevity to RRT = 5.2 years. Battery voltage = 2.98 V.   Atrial Arrhythmia: 2 AT lasting up to 52 minutes  AF [place]: <0.1%  Anticoagulant: Warfarin  Ventricular Arrhythmia: None  Pt Notified of Transmission Results: [employer]    Assessment and Plan:   Paroxysmal atrial fibrillation/flutter s/p CTI 2014 and focal AT ablation 2014  Hx of VT status post ICD  CAD s/p CABG and PCI    He has recurrent falls but they appear to be related to weakness rather than arrhythmia. Currently he has no cardiac symptoms. Very low a-fib burden based on his ICD, with possibly recurrent AT. Given his age, co-morbidities and lack of symptoms, there is no indication to treat his rare episodes of AT. We will continue with his current dose of metoprolol and warfarin for stroke ppx. If falls continue to be an issue may need to discuss alternative stroke ppx options such as a Watchman device. We will plan to see him again as a virtual visit in 6 months. Additionally he used to follow with Dr. Santos for peripheral vascular disease and venous insufficiency and they are requesting a vascular medicine referral which we will place for him on this visit.       Telephone Visit Duration: 7 minutes    I have interviewed patient. I have reviewed the laboratory tests, imaging, and other investigations. I have reviewed the management plan with the patient. I discussed with the team and agree with the findings and plan in this resident/fellow/nurse practitioner's note. In addition, changes in the assessment and plan have been incorporated into the note by myself, as to make it a single cohesive document.       Deedee Baird MD, MS  Cardiology/Cardiac EP Attending Staff

## 2022-12-05 NOTE — PATIENT INSTRUCTIONS
Thank you for coming to the [facility] at [place]; please note the following instructions:    1. Vascular medicine referral placed.  Please call [phone] to schedule    2.  Follow-up with Dr. Brie finnegan in 6 months        If you have any questions regarding your visit, please contact your care team:     CARDIOLOGY  TELEPHONE NUMBER   Sendy AIKEN, Registered Nurse  Sary VILLAFANA, Registered Nurse  Sparkle GUNTER, Registered Medical Assistant  Marj STEWARD, Certified Medical Assistant  Cleo WARREN, Visit Facilitator [phone] (select option 1)    *After hours: [phone]   For Scheduling Appts:     [phone] (select option 1)    *After hours: [phone]   For the Device Clinic (Pacemakers and ICD's)  Mary GARCIA, Registered Nurse   During business hours: [phone]    *After business hours:  [phone] (select option 4)      Normal test result notifications will be released via [employer] or mailed within 7 business days.  All other test results, will be communicated via telephone once reviewed by your cardiologist.    If you need a medication refill, please contact your pharmacy.  Please allow 3 business days for your refill to be completed.    As always, thank you for trusting us with your health care needs!

## 2022-12-08 ENCOUNTER — TRANSITIONAL CARE UNIT VISIT (OUTPATIENT)
Dept: GERIATRICS | Facility: CLINIC | Age: 87
End: 2022-12-08
Payer: COMMERCIAL

## 2022-12-08 VITALS
WEIGHT: 150 LBS | BODY MASS INDEX: 23.54 KG/M2 | HEIGHT: 67 IN | SYSTOLIC BLOOD PRESSURE: 145 MMHG | RESPIRATION RATE: 16 BRPM | TEMPERATURE: 96.5 F | HEART RATE: 65 BPM | DIASTOLIC BLOOD PRESSURE: 70 MMHG | OXYGEN SATURATION: 98 %

## 2022-12-08 DIAGNOSIS — I48.91 ATRIAL FIBRILLATION, UNSPECIFIED TYPE (H): Primary | ICD-10-CM

## 2022-12-08 DIAGNOSIS — R53.81 PHYSICAL DECONDITIONING: ICD-10-CM

## 2022-12-08 DIAGNOSIS — K59.00 CONSTIPATION, UNSPECIFIED CONSTIPATION TYPE: ICD-10-CM

## 2022-12-08 DIAGNOSIS — M35.3 POLYMYALGIA RHEUMATICA (H): ICD-10-CM

## 2022-12-08 LAB — INR (EXTERNAL): 1.7 (ref 0.9–1.1)

## 2022-12-08 PROCEDURE — 99309 SBSQ NF CARE MODERATE MDM 30: CPT | Performed by: NURSE PRACTITIONER

## 2022-12-08 NOTE — LETTER
12/8/2022        RE: Noe Florence  [address] [facility] [address]        [facility] GERIATRICS    Chief Complaint   Patient presents with     RECHECK     HPI:  Noe Florence is a 87 year old  (1935), who is being seen today for an episodic care visit at: [facility] ([facility]) [[id number]].     Background:    This is an 87-year-old male, with a past medical history significant for recurrent falls, muscle weakness, peripheral arterial disease, neuropathy, foot flare, degenerative joint disease of the left knee, atrial fibrillation on anticoagulation, hypertension, hyperlipidemia, coronary artery disease s/p CABG x 2 with JEREMY, V Tach s/p ICD, anemia, DVT, depression, and colon cancer s/p laparoscopic resection 2019, who was admitted to [facility] from home after having recurrent falls.    Today's concern is:     Atrial Fibrillation on Anticoagulation, History of Recurrent DVT, and History of V. Tach S/P ICD Placement. Today, INR 2.1. Previous INR and Warfarin dosing has been as follows:     Date INR New Dose/Orders   11/16.22 1.6 Warfarin 2.5 mg on Tu, Th, Sun and 5 mg PO QOD   11/18/22 1.6 Warfarin 5 mg PO every day    11/21/22 2.4 Warfarin 2.5 mg PO on Tu, Th, Sun and 5 mg PO AOD   11/25/22 1.8 Warfarin 2.5 mg PO on Tu, Th, Sun and 5 mg PO AOD   12/1/22 2.1 Warfarin 2.5 mg PO on Tu, Th, Sun and 5 mg PO AOD   12/8/22 1.7 Warfarin 2.5 mg PO on Tu, Th, Sun and 5 mg PO QOD      Constipation. Following today's visit, staff noted patient has been constipated and utilizing LEXX for Senna. Requesting to have scheduled. Upon review of documentation, last bowel movement 12/5/22.    Physical Deconditioning. Working with Physical and Occupational Therapy. Upon review of documentation, maximum assistance needed with transfers. Ambulating 100 feet with FWW and minimal assistance. Maximum assistance needed with dressing and toileting. SLUMS 6/30. Safety Questionnaire 5.5/19. Unfortunately, not  "making progress.    Allergies, and PMH/PSH reviewed in EPIC today.  REVIEW OF SYSTEMS:  4 point ROS including Respiratory, CV, GI and , other than that noted in the HPI,  is negative    Objective:   BP (!) 145/70   Pulse 65   Temp (!) 96.5  F (35.8  C)   Resp 16   Ht 1.702 m (5' 7\")   Wt 68 kg (150 lb)   SpO2 98%   BMI 23.49 kg/m    GENERAL APPEARANCE:  Alert, in no distress  ENT:  Mouth and posterior oropharynx normal, moist mucous membranes  EYES:  EOM, conjunctivae, lids, pupils and irises normal  RESP:  no respiratory distress  SKIN:  Scattered bruising on BUE  PSYCH:  memory impaired     Labs done in SNF are in [facility]. Please refer to them using [employer] Everywhere.    Assessment/Plan:   Atrial Fibrillation on Anticoagulation, History of Recurrent DVT, and History of V. Tach S/P ICD Placement. INR goal 1.5-2.5 due to history of falls and bleed. Most recent home dose per [facility] Warfarin 2.5 mg on Tu, Th, Sun and 5 mg all other days. Will continue home dose with repeat INR on 12/19/22. Takes Metoprolol. Has had intermittent episodes of mild bradycardia since TCU admission. Continue to monitor heart rate. Seen by Cardiology on 12/5/22.    Constipation. Will add Senna 1 tab PO BID to help with regular bowel movements.    Recurrent Falls. With legs giving out per patient and significant other's report. Physical and Occupational Therapy ordered. Goal is to get stronger and return home.     Neuropathy, Chronic Venous Insufficiency, Bilateral Lower Extremity Edema, and Left Foot Drop. Related to above. Physical and Occupational Therapy ordered. Takes Tizanidine for muscle spasms.     Coronary Artery Disease S/P CABG x 2 with JEREMY, Hypertension, and Hyperlipidemia. Followed by [facility]. Last seen 12/5/22. Taken off Aspirin due to recurrent falls. No longer on Atorvastatin due to concern for myalgias. Most blood pressures < 140/90 over the past 5 days. Takes " Metoprolol.     Cognitive Impairment. SLUMS 6/30. Lived at home with significant other prior to admission.  to assist with appropriate discharge disposition.       Anemia. Baseline Hemoglobin ~ 10-11. Last Hemoglobin 8.6 on 11/28/22. Takes Ferrous Sulfate. Will repeat labs on 12/12/22 to ensure stability     Chronic Kidney Disease Stage III. Baseline Creatinine mid 1s. Last Creatinine 1.57 on 11/28/22. Monitor periodically.     Depression. Significant other reports patient is followed by a psychiatrist. Taking Aripiprazole, Mirtazapine, and Sertraline.     Osteoporosis. Started on scheduled Acetaminophen on 12/1/22. Continue Alendronate and Cholecalciferol as ordered.     Urinary Incontinence. Continue Tamsulosin as ordered.     History of Colon Cancer S/P Laparoscopic Resection 2019. Followed by Oncology. Last seen 10/3/22.     Elevated MMA. On high dose Vitamin B12 supplementation.      Tinea Pedis and Stasis Dermatitis. Has various lotions ordered for bilateral lower extremities and feet.     Polymyalgia Rheumatica. Noted in history. Requires no medication.      Physical Deconditioning. Physical and Occupational Therapy ordered.    Orders:  Warfarin 2.5 mg PO on Tu, Th, Sun and 5 mg PO every other day  Recheck INR on 12/19/22  Senna 1 tab PO BID    Electronically signed by: MARIA LUISA Morataya CNP             Sincerely,        MARIA LUISA Morataya CNP

## 2022-12-08 NOTE — PROGRESS NOTES
[facility] GERIATRICS    Chief Complaint   Patient presents with     RECHECK     HPI:  Noe Florence is a 87 year old  (1935), who is being seen today for an episodic care visit at: Health system ([facility]) [[id number]].     Background:    This is an 87-year-old male, with a past medical history significant for recurrent falls, muscle weakness, peripheral arterial disease, neuropathy, foot flare, degenerative joint disease of the left knee, atrial fibrillation on anticoagulation, hypertension, hyperlipidemia, coronary artery disease s/p CABG x 2 with JEREMY, V Tach s/p ICD, anemia, DVT, depression, and colon cancer s/p laparoscopic resection 2019, who was admitted to [facility] from home after having recurrent falls.    Today's concern is:     Atrial Fibrillation on Anticoagulation, History of Recurrent DVT, and History of V. Tach S/P ICD Placement. Today, INR 2.1. Previous INR and Warfarin dosing has been as follows:     Date INR New Dose/Orders   11/16.22 1.6 Warfarin 2.5 mg on Tu, Th, Sun and 5 mg PO QOD   11/18/22 1.6 Warfarin 5 mg PO every day    11/21/22 2.4 Warfarin 2.5 mg PO on Tu, Th, Sun and 5 mg PO AOD   11/25/22 1.8 Warfarin 2.5 mg PO on Tu, Th, Sun and 5 mg PO AOD   12/1/22 2.1 Warfarin 2.5 mg PO on Tu, Th, Sun and 5 mg PO AOD   12/8/22 1.7 Warfarin 2.5 mg PO on Tu, Th, Sun and 5 mg PO QOD      Constipation. Following today's visit, staff noted patient has been constipated and utilizing LEXX for Senna. Requesting to have scheduled. Upon review of documentation, last bowel movement 12/5/22.    Physical Deconditioning. Working with Physical and Occupational Therapy. Upon review of documentation, maximum assistance needed with transfers. Ambulating 100 feet with FWW and minimal assistance. Maximum assistance needed with dressing and toileting. SLUMS 6/30. Safety Questionnaire 5.5/19. Unfortunately, not making progress.    Allergies, and PMH/PSH reviewed in EPIC today.  REVIEW OF SYSTEMS:  4  "point ROS including Respiratory, CV, GI and , other than that noted in the HPI,  is negative    Objective:   BP (!) 145/70   Pulse 65   Temp (!) 96.5  F (35.8  C)   Resp 16   Ht 1.702 m (5' 7\")   Wt 68 kg (150 lb)   SpO2 98%   BMI 23.49 kg/m    GENERAL APPEARANCE:  Alert, in no distress  ENT:  Mouth and posterior oropharynx normal, moist mucous membranes  EYES:  EOM, conjunctivae, lids, pupils and irises normal  RESP:  no respiratory distress  SKIN:  Scattered bruising on BUE  PSYCH:  memory impaired     Labs done in SNF are in [facility]. Please refer to them using [employer] Everywhere.    Assessment/Plan:   Atrial Fibrillation on Anticoagulation, History of Recurrent DVT, and History of V. Tach S/P ICD Placement. INR goal 1.5-2.5 due to history of falls and bleed. Most recent home dose per [facility] Warfarin 2.5 mg on Tu, Th, Sun and 5 mg all other days. Will continue home dose with repeat INR on 12/19/22. Takes Metoprolol. Has had intermittent episodes of mild bradycardia since TCU admission. Continue to monitor heart rate. Seen by Cardiology on 12/5/22.    Constipation. Will add Senna 1 tab PO BID to help with regular bowel movements.    Recurrent Falls. With legs giving out per patient and significant other's report. Physical and Occupational Therapy ordered. Goal is to get stronger and return home.     Neuropathy, Chronic Venous Insufficiency, Bilateral Lower Extremity Edema, and Left Foot Drop. Related to above. Physical and Occupational Therapy ordered. Takes Tizanidine for muscle spasms.     Coronary Artery Disease S/P CABG x 2 with JEREMY, Hypertension, and Hyperlipidemia. Followed by [facility]. Last seen 12/5/22. Taken off Aspirin due to recurrent falls. No longer on Atorvastatin due to concern for myalgias. Most blood pressures < 140/90 over the past 5 days. Takes Metoprolol.     Cognitive Impairment. SLUMS 6/30. Lived at home with significant other prior to admission. "  to assist with appropriate discharge disposition.       Anemia. Baseline Hemoglobin ~ 10-11. Last Hemoglobin 8.6 on 11/28/22. Takes Ferrous Sulfate. Will repeat labs on 12/12/22 to ensure stability     Chronic Kidney Disease Stage III. Baseline Creatinine mid 1s. Last Creatinine 1.57 on 11/28/22. Monitor periodically.     Depression. Significant other reports patient is followed by a psychiatrist. Taking Aripiprazole, Mirtazapine, and Sertraline.     Osteoporosis. Started on scheduled Acetaminophen on 12/1/22. Continue Alendronate and Cholecalciferol as ordered.     Urinary Incontinence. Continue Tamsulosin as ordered.     History of Colon Cancer S/P Laparoscopic Resection 2019. Followed by Oncology. Last seen 10/3/22.     Elevated MMA. On high dose Vitamin B12 supplementation.      Tinea Pedis and Stasis Dermatitis. Has various lotions ordered for bilateral lower extremities and feet.     Polymyalgia Rheumatica. Noted in history. Requires no medication.      Physical Deconditioning. Physical and Occupational Therapy ordered.    Orders:  Warfarin 2.5 mg PO on Tu, Th, Sun and 5 mg PO every other day  Recheck INR on 12/19/22  Senna 1 tab PO BID    Electronically signed by: MARIA LUISA Morataya CNP

## 2022-12-09 RX ORDER — SENNOSIDES 8.6 MG
1 TABLET ORAL 2 TIMES DAILY
Status: ON HOLD | COMMUNITY
End: 2024-01-01

## 2022-12-15 ENCOUNTER — TRANSITIONAL CARE UNIT VISIT (OUTPATIENT)
Dept: GERIATRICS | Facility: CLINIC | Age: 87
End: 2022-12-15
Payer: COMMERCIAL

## 2022-12-15 VITALS
BODY MASS INDEX: 23.48 KG/M2 | WEIGHT: 149.6 LBS | OXYGEN SATURATION: 95 % | RESPIRATION RATE: 18 BRPM | HEIGHT: 67 IN | DIASTOLIC BLOOD PRESSURE: 70 MMHG | SYSTOLIC BLOOD PRESSURE: 157 MMHG | HEART RATE: 73 BPM | TEMPERATURE: 97 F

## 2022-12-15 DIAGNOSIS — K59.00 CONSTIPATION, UNSPECIFIED CONSTIPATION TYPE: ICD-10-CM

## 2022-12-15 DIAGNOSIS — R41.89 COGNITIVE IMPAIRMENT: Primary | ICD-10-CM

## 2022-12-15 DIAGNOSIS — M35.3 POLYMYALGIA RHEUMATICA (H): ICD-10-CM

## 2022-12-15 PROCEDURE — 99309 SBSQ NF CARE MODERATE MDM 30: CPT | Performed by: NURSE PRACTITIONER

## 2022-12-15 NOTE — LETTER
"    12/15/2022        RE: Noe Florence  [address] [facility] [address]        [facility]  [place] [facility] Record Number: [id number]  Chief Complaint   Patient presents with     RECHECK     HPI: Noe Florence is a 87 year old (1935), who is being seen today for an episodic care visit at: [facility] ([facility]) [[id number]].     Background:    This is an 87-year-old male, with a past medical history significant for recurrent falls, muscle weakness, peripheral arterial disease, neuropathy, foot flare, degenerative joint disease of the left knee, atrial fibrillation on anticoagulation, hypertension, hyperlipidemia, coronary artery disease s/p CABG x 2 with JEREMY, V Tach s/p ICD, anemia, DVT, depression, and colon cancer s/p laparoscopic resection 2019, who was admitted to [facility] from home after having recurrent falls.    Today's concern is:     Cognitive Impairment. Today, patient is pushing his bedside table towards the door of his room. States he's moving to his room near the gym. Has his shoes behind him in the wheelchair with his splint.     Constipation. Upon review of bowel movements over the past 5 days, has had 2 bowel movements.    Anemia. Hemoglobin 10.5 on 10/28/22 -> 8.6 on 11/28/22.    Allergies and PMH/PSH reviewed in [facility] today.    REVIEW OF SYSTEMS:  4 point ROS including Respiratory, CV, GI and , other than that noted in the HPI,  is negative    Objective:   BP (!) 157/70   Pulse 73   Temp 97  F (36.1  C)   Resp 18   Ht 1.702 m (5' 7\")   Wt 67.9 kg (149 lb 9.6 oz)   SpO2 95%   BMI 23.43 kg/m    Exam:  GENERAL APPEARANCE:  Alert, in no distress  ENT:  Mouth and posterior oropharynx normal, moist mucous membranes  EYES:  EOM, conjunctivae, lids, pupils and irises normal  RESP:  respiratory effort and palpation of chest normal, lungs clear to auscultation , no respiratory distress  CV:  Palpation and auscultation of heart done , regular rate and " rhythm, no murmur, rub, or gallop  ABDOMEN:  normal bowel sounds, soft, nontender, no hepatosplenomegaly or other masses  M/S:   Trace edema in BLE  SKIN:  Inspection of skin and subcutaneous tissue baseline, Palpation of skin and subcutaneous tissue baseline  NEURO:   Cranial nerves 2-12 are normal tested and grossly at patient's baseline  PSYCH:  memory impaired     Labs:   Labs done in SNF are in [facility]. Please refer to them using [employer] Everywhere.    Assessment/Plan:  Cognitive Impairment. SLUMS 6/30. Lived at home with significant other prior to admission.  to assist with appropriate discharge disposition.      Constipation. Continue Senna as ordered.    Atrial Fibrillation on Anticoagulation, History of Recurrent DVT, and History of V. Tach S/P ICD Placement. INR goal 1.5-2.5 due to history of falls and bleed. Most recent home dose per [facility] Warfarin 2.5 mg on Tu, Th, Sun and 5 mg all other days. Will continue home dose with repeat INR on 12/19/22. Takes Metoprolol. Has had intermittent episodes of mild bradycardia since TCU admission. Continue to monitor heart rate. Seen by Cardiology on 12/5/22.     Recurrent Falls. With legs giving out per patient and significant other's report. Physical and Occupational Therapy ordered. Continues to make progress. Goal is to get stronger and return home.     Neuropathy, Chronic Venous Insufficiency, Bilateral Lower Extremity Edema, and Left Foot Drop. Related to above. Physical and Occupational Therapy ordered. Takes Tizanidine for muscle spasms.     Coronary Artery Disease S/P CABG x 2 with JEREMY, Hypertension, and Hyperlipidemia. Followed by [facility]. Last seen 12/5/22. Taken off Aspirin due to recurrent falls. No longer on Atorvastatin due to concern for myalgias. Takes Metoprolol.     Anemia. Baseline Hemoglobin ~ 10-11. Last Hemoglobin 8.6 on 11/28/22. Takes Ferrous Sulfate. Will order repeat labs as they have yet to  be drawn.      Chronic Kidney Disease Stage III. Baseline Creatinine mid 1s. Last Creatinine 1.57 on 11/28/22. Repeat labs to ensure stability.     Depression. Significant other reports patient is followed by a psychiatrist. Taking Aripiprazole, Mirtazapine, and Sertraline.     Osteoporosis. Started on scheduled Acetaminophen on 12/1/22. Continue Alendronate and Cholecalciferol as ordered.     Urinary Incontinence. Continue Tamsulosin as ordered.     History of Colon Cancer S/P Laparoscopic Resection 2019. Followed by Oncology. Last seen 10/3/22.     Elevated MMA. On high dose Vitamin B12 supplementation.      Tinea Pedis and Stasis Dermatitis. Has various lotions ordered for bilateral lower extremities and feet.     Polymyalgia Rheumatica. Noted in history. Requires no medication.      Physical Deconditioning. Physical and Occupational Therapy ordered.    Orders:  CBC, BMP on 12/22    Electronically signed by: MARIA LUISA Morataya CNP        Sincerely,        MARIA LUISA Morataya CNP

## 2022-12-15 NOTE — PROGRESS NOTES
"[facility] GERIATRICS  [place] Medical Record Number: [id number]  Chief Complaint   Patient presents with     RECHECK     HPI: Noe Florence is a 87 year old (1935), who is being seen today for an episodic care visit at: [facility] ([facility]) [[id number]].     Background:    This is an 87-year-old male, with a past medical history significant for recurrent falls, muscle weakness, peripheral arterial disease, neuropathy, foot flare, degenerative joint disease of the left knee, atrial fibrillation on anticoagulation, hypertension, hyperlipidemia, coronary artery disease s/p CABG x 2 with JEREMY, V Tach s/p ICD, anemia, DVT, depression, and colon cancer s/p laparoscopic resection 2019, who was admitted to [facility] from home after having recurrent falls.    Today's concern is:     Cognitive Impairment. Today, patient is pushing his bedside table towards the door of his room. States he's moving to his room near the gym. Has his shoes behind him in the wheelchair with his splint.     Constipation. Upon review of bowel movements over the past 5 days, has had 2 bowel movements.    Anemia. Hemoglobin 10.5 on 10/28/22 -> 8.6 on 11/28/22.    Allergies and PMH/PSH reviewed in EPIC today.    REVIEW OF SYSTEMS:  4 point ROS including Respiratory, CV, GI and , other than that noted in the HPI,  is negative    Objective:   BP (!) 157/70   Pulse 73   Temp 97  F (36.1  C)   Resp 18   Ht 1.702 m (5' 7\")   Wt 67.9 kg (149 lb 9.6 oz)   SpO2 95%   BMI 23.43 kg/m    Exam:  GENERAL APPEARANCE:  Alert, in no distress  ENT:  Mouth and posterior oropharynx normal, moist mucous membranes  EYES:  EOM, conjunctivae, lids, pupils and irises normal  RESP:  respiratory effort and palpation of chest normal, lungs clear to auscultation , no respiratory distress  CV:  Palpation and auscultation of heart done , regular rate and rhythm, no murmur, rub, or gallop  ABDOMEN:  normal bowel sounds, soft, nontender, no " hepatosplenomegaly or other masses  M/S:   Trace edema in BLE  SKIN:  Inspection of skin and subcutaneous tissue baseline, Palpation of skin and subcutaneous tissue baseline  NEURO:   Cranial nerves 2-12 are normal tested and grossly at patient's baseline  PSYCH:  memory impaired     Labs:   Labs done in SNF are in [facility]. Please refer to them using EPIC/Care Everywhere.    Assessment/Plan:  Cognitive Impairment. SLUMS 6/30. Lived at home with significant other prior to admission.  to assist with appropriate discharge disposition.      Constipation. Continue Senna as ordered.    Atrial Fibrillation on Anticoagulation, History of Recurrent DVT, and History of V. Tach S/P ICD Placement. INR goal 1.5-2.5 due to history of falls and bleed. Most recent home dose per [facility] Warfarin 2.5 mg on Tu, Th, Sun and 5 mg all other days. Will continue home dose with repeat INR on 12/19/22. Takes Metoprolol. Has had intermittent episodes of mild bradycardia since TCU admission. Continue to monitor heart rate. Seen by Cardiology on 12/5/22.     Recurrent Falls. With legs giving out per patient and significant other's report. Physical and Occupational Therapy ordered. Continues to make progress. Goal is to get stronger and return home.     Neuropathy, Chronic Venous Insufficiency, Bilateral Lower Extremity Edema, and Left Foot Drop. Related to above. Physical and Occupational Therapy ordered. Takes Tizanidine for muscle spasms.     Coronary Artery Disease S/P CABG x 2 with JEREMY, Hypertension, and Hyperlipidemia. Followed by [facility]. Last seen 12/5/22. Taken off Aspirin due to recurrent falls. No longer on Atorvastatin due to concern for myalgias. Takes Metoprolol.     Anemia. Baseline Hemoglobin ~ 10-11. Last Hemoglobin 8.6 on 11/28/22. Takes Ferrous Sulfate. Will order repeat labs as they have yet to be drawn.      Chronic Kidney Disease Stage III. Baseline Creatinine mid 1s. Last  Creatinine 1.57 on 11/28/22. Repeat labs to ensure stability.     Depression. Significant other reports patient is followed by a psychiatrist. Taking Aripiprazole, Mirtazapine, and Sertraline.     Osteoporosis. Started on scheduled Acetaminophen on 12/1/22. Continue Alendronate and Cholecalciferol as ordered.     Urinary Incontinence. Continue Tamsulosin as ordered.     History of Colon Cancer S/P Laparoscopic Resection 2019. Followed by Oncology. Last seen 10/3/22.     Elevated MMA. On high dose Vitamin B12 supplementation.      Tinea Pedis and Stasis Dermatitis. Has various lotions ordered for bilateral lower extremities and feet.     Polymyalgia Rheumatica. Noted in history. Requires no medication.      Physical Deconditioning. Physical and Occupational Therapy ordered.    Orders:  CBC, BMP on 12/22    Electronically signed by: MARIA LUISA Morataya CNP

## 2022-12-19 LAB — INR (EXTERNAL): 2.5 (ref 0.9–1.1)

## 2022-12-22 ENCOUNTER — LAB REQUISITION (OUTPATIENT)
Dept: LAB | Facility: CLINIC | Age: 87
End: 2022-12-22
Payer: COMMERCIAL

## 2022-12-22 DIAGNOSIS — D64.9 ANEMIA, UNSPECIFIED: ICD-10-CM

## 2022-12-23 LAB
ANION GAP SERPL CALCULATED.3IONS-SCNC: 15 MMOL/L (ref 7–15)
BUN SERPL-MCNC: 43.9 MG/DL (ref 8–23)
CALCIUM SERPL-MCNC: 9.4 MG/DL (ref 8.8–10.2)
CHLORIDE SERPL-SCNC: 111 MMOL/L (ref 98–107)
CREAT SERPL-MCNC: 1.42 MG/DL (ref 0.67–1.17)
DEPRECATED HCO3 PLAS-SCNC: 17 MMOL/L (ref 22–29)
ERYTHROCYTE [DISTWIDTH] IN BLOOD BY AUTOMATED COUNT: 15.1 % (ref 10–15)
GFR SERPL CREATININE-BSD FRML MDRD: 48 ML/MIN/1.73M2
GLUCOSE SERPL-MCNC: 126 MG/DL (ref 70–99)
HCT VFR BLD AUTO: 33.1 % (ref 40–53)
HGB BLD-MCNC: 10 G/DL (ref 13.3–17.7)
MCH RBC QN AUTO: 29.9 PG (ref 26.5–33)
MCHC RBC AUTO-ENTMCNC: 30.2 G/DL (ref 31.5–36.5)
MCV RBC AUTO: 99 FL (ref 78–100)
PLATELET # BLD AUTO: 243 10E3/UL (ref 150–450)
POTASSIUM SERPL-SCNC: 4.5 MMOL/L (ref 3.4–5.3)
RBC # BLD AUTO: 3.35 10E6/UL (ref 4.4–5.9)
SODIUM SERPL-SCNC: 143 MMOL/L (ref 136–145)
WBC # BLD AUTO: 8.3 10E3/UL (ref 4–11)

## 2022-12-23 PROCEDURE — 85027 COMPLETE CBC AUTOMATED: CPT | Mod: ORL | Performed by: NURSE PRACTITIONER

## 2022-12-23 PROCEDURE — 80048 BASIC METABOLIC PNL TOTAL CA: CPT | Mod: ORL | Performed by: NURSE PRACTITIONER

## 2022-12-23 PROCEDURE — 36415 COLL VENOUS BLD VENIPUNCTURE: CPT | Mod: ORL | Performed by: NURSE PRACTITIONER

## 2022-12-23 PROCEDURE — P9603 ONE-WAY ALLOW PRORATED MILES: HCPCS | Mod: ORL | Performed by: NURSE PRACTITIONER

## 2022-12-26 ENCOUNTER — DISCHARGE SUMMARY NURSING HOME (OUTPATIENT)
Dept: GERIATRICS | Facility: CLINIC | Age: 87
End: 2022-12-26
Payer: COMMERCIAL

## 2022-12-26 VITALS
TEMPERATURE: 98.7 F | OXYGEN SATURATION: 98 % | HEART RATE: 67 BPM | RESPIRATION RATE: 16 BRPM | SYSTOLIC BLOOD PRESSURE: 145 MMHG | BODY MASS INDEX: 23.99 KG/M2 | WEIGHT: 153.2 LBS | DIASTOLIC BLOOD PRESSURE: 79 MMHG

## 2022-12-26 DIAGNOSIS — F41.9 ANXIETY AND DEPRESSION: ICD-10-CM

## 2022-12-26 DIAGNOSIS — K59.00 CONSTIPATION, UNSPECIFIED CONSTIPATION TYPE: ICD-10-CM

## 2022-12-26 DIAGNOSIS — R53.81 PHYSICAL DECONDITIONING: ICD-10-CM

## 2022-12-26 DIAGNOSIS — I48.91 ATRIAL FIBRILLATION, UNSPECIFIED TYPE (H): ICD-10-CM

## 2022-12-26 DIAGNOSIS — F32.A ANXIETY AND DEPRESSION: ICD-10-CM

## 2022-12-26 DIAGNOSIS — R41.89 COGNITIVE IMPAIRMENT: Primary | ICD-10-CM

## 2022-12-26 DIAGNOSIS — I10 HYPERTENSION, UNSPECIFIED TYPE: ICD-10-CM

## 2022-12-26 DIAGNOSIS — R29.6 RECURRENT FALLS: ICD-10-CM

## 2022-12-26 DIAGNOSIS — M35.3 POLYMYALGIA RHEUMATICA (H): ICD-10-CM

## 2022-12-26 PROCEDURE — 99316 NF DSCHRG MGMT 30 MIN+: CPT | Performed by: NURSE PRACTITIONER

## 2022-12-26 NOTE — LETTER
12/26/2022        RE: Noe Florence  [address] [facility] [address]        [facility] GERIATRICS DISCHARGE SUMMARY  PATIENT'S NAME: Noe Florence  YOB: 1935  MEDICAL RECORD NUMBER:  [id number]  Place of Service where encounter took place:  [facility] (TCU) [[id number]]    PRIMARY CARE PROVIDER AND CLINIC RESPONSIBLE AFTER TRANSFER:   Roberto Sarmiento MD, [address] [facility] [address]    [facility] Provider     Transferring providers: Mili Bajwa, MARIA LUISA SANCHES, Renay Srinivasan DO  Date of SNF Admission: 11/15/22  Date of SNF (anticipated) Discharge: 12/28/22  Discharged to: previous independent home  Cognitive Scores: SLUMS Score 6/30. Safety Questionnaire 5.5/19  Physical Function: Maximum assistance needed with transfers. Ambulating 100 feet with FWW and minimal assistance. Maximum assistance needed with dressing and toileting.  DME: No new DME needed    CODE STATUS/ADVANCE DIRECTIVES DISCUSSION:  Full Code   ALLERGIES: Latex    NURSING FACILITY COURSE   This is an 87-year-old male, with a past medical history significant for recurrent falls, muscle weakness, peripheral arterial disease, neuropathy, foot flare, degenerative joint disease of the left knee, atrial fibrillation on anticoagulation, hypertension, hyperlipidemia, coronary artery disease s/p CABG x 2 with JEREMY, V Tach s/p ICD, anemia, DVT, depression, and colon cancer s/p laparoscopic resection 2019, who was admitted to [facility] from home after having recurrent falls.    Cognitive Impairment. SLUMS 6/30. Lived at home with significant other prior to admission. Will return home with significant other, home care, and PCA services.     Constipation. Continue Senna as ordered.     Atrial Fibrillation on Anticoagulation, History of Recurrent DVT, and History of V. Tach S/P ICD Placement. INR goal 1.5-2.5 due to history of falls and bleed. Most recent home dose per [facility] Warfarin 2.5 mg on Tu,  Th, Sun and 5 mg all other days. Next INR 12/29/22. Takes Metoprolol. Has had intermittent episodes of mild bradycardia since TCU admission, upper 50s. Seen by Cardiology on 12/5/22.     Date INR New Dose/Orders   11/16.22 1.6 Warfarin 2.5 mg on Tu, Th, Sun and 5 mg PO QOD   11/18/22 1.6 Warfarin 5 mg PO every day    11/21/22 2.4 Warfarin 2.5 mg PO on Tu, Th, Sun and 5 mg PO AOD   11/25/22 1.8 Warfarin 2.5 mg PO on Tu, Th, Sun and 5 mg PO AOD   12/1/22 2.1 Warfarin 2.5 mg PO on Tu, Th, Sun and 5 mg PO AOD   12/8/22 1.7 Warfarin 2.5 mg PO on Tu, Th, Sun and 5 mg PO AOD   12/19/22 2.5 Warfarin 2.5 mg PO on Tu, Th, Sun and 5 mg PO AOD     Recurrent Falls. With legs giving out per patient and significant other's report. Last fall on 12/23/22. Found on the floor. No injury. Home Physical and Occupational Therapy ordered. Also has PCA Services at home.     Neuropathy, Chronic Venous Insufficiency, Bilateral Lower Extremity Edema, and Left Foot Drop. Related to above.      Coronary Artery Disease S/P CABG x 2 with JEREMY, Hypertension, and Hyperlipidemia. Followed by [facility]. Last seen 12/5/22. Taken off Aspirin due to recurrent falls. No longer on Atorvastatin due to concern for myalgias. Takes Metoprolol. Upon review of blood pressures over the past 5 days, blood pressures labile with systolic range from 101-175. Diastolic range from 54-87. Home Health to follow. Will not make any changes at this time given risk of hypotension and falls.     Anemia. Baseline Hemoglobin ~ 10-11. Last Hemoglobin 10.0 on 12/23/22. Takes Ferrous Sulfate.      Chronic Kidney Disease Stage III. Baseline Creatinine mid 1s. Last Creatinine 1.42 on 12/23/22. Monitor periodically.     Depression. Followed psychiatrist, Dr. Naren Stanton. Ariprazole discontinued and Risperdal initiated on 12/17/22. Taking Mirtazapine and Sertraline.    Left Heel Wound. Noted on 12/7/22 documentation. Has heel protectors. Significant Other requests  compression stockings per worn despite wound on heel. Education provided by staff.     Osteoporosis. Started on scheduled Acetaminophen on 12/1/22. Continue Alendronate and Cholecalciferol as ordered.     Urinary Incontinence. Continue Tamsulosin as ordered.     History of Colon Cancer S/P Laparoscopic Resection 2019. Followed by Oncology. Last seen 10/3/22.     Elevated MMA. On high dose Vitamin B12 supplementation.      Tinea Pedis and Stasis Dermatitis. Has various lotions ordered for bilateral lower extremities and feet.     Polymyalgia Rheumatica. Noted in history. Requires no medication.      Physical Deconditioning. Home Physical and Occupational Therapy ordered.    Discharge Medications:  Current Outpatient Medications   Medication Sig Dispense Refill     acetaminophen (TYLENOL) 500 MG tablet Take 1,000 mg by mouth 2 times daily And every day PRN       alendronate (FOSAMAX) 70 MG tablet Take 1 tablet (70 mg) by mouth every 7 days Take with a full glass of water and do not eat or lay down for 30 minutes 12 tablet 3     ARIPiprazole (ABILIFY) 5 MG tablet Take 5 mg by mouth daily       bacitracin 500 UNIT/GM ophthalmic ointment 1 application, topical, Once A Day, Apply to open areas on right great to and right second toe and cover with dressing daily (has pressure area to toes)       calcium carbonate 500 mg, elemental, (OSCAL;OYSTER SHELL CALCIUM) 500 MG tablet Take 1 tablet (500 mg) by mouth 2 times daily 180 tablet 3     cholecalciferol 1000 units TABS Take 1,000 Units by mouth 2 times daily       COMPRESSION STOCKINGS 1 each daily 1 each 4     cyanocobalamin (VITAMIN B-12) 1000 MCG tablet Take 4 daily 400 tablet 3     Emollient (CERAVE SA RENEWING) LOTN 1 application, topical, Once A Day, Apply to torso       Emollient (CERAVE) CREA 1 application, topical, Once A Day, Apply to bilateral legs       ferrous sulfate (FE TABS) 325 (65 Fe) MG EC tablet Take 325 mg by mouth daily       ketoconazole (NIZORAL) 2 %  external cream Apply to affected areas on chest and feet daily as needed 60 g 6     ketoconazole (NIZORAL) 2 % external cream Apply to the feet 2 times a day until rash clears then 3-4 times a week for maintenance 60 g 11     ketoconazole (NIZORAL) 2 % external shampoo Apply to scalp daily as needed until flaking resolves 120 mL 1     metoprolol tartrate (LOPRESSOR) 25 MG tablet Take 1 tablet (25 mg) by mouth 2 times daily 180 tablet 3     mirtazapine (REMERON) 15 MG tablet Take 15 mg by mouth At Bedtime.       Multiple Vitamins-Minerals (MULTIVITAMIN ADULT PO)        sennosides (SENOKOT) 8.6 MG tablet Take 1 tablet by mouth 2 times daily       sertraline (ZOLOFT) 100 MG tablet Take 200 mg by mouth every evening       tamsulosin (FLOMAX) 0.4 MG capsule TAKE 2 CAPSULES (0.8 MG) BY MOUTH DAILY 180 capsule 2     urea (GORMEL) 20 % external cream Apply topically 2 times daily Apply to bilateral heels BID       warfarin ANTICOAGULANT (JANTOVEN ANTICOAGULANT) 5 MG tablet TAKE ONE TABLET BY MOUTH DAILY OR AS DIRECTED BY THE COUMADIN CLINIC 90 tablet 1      Controlled medications:   not applicable/none     Past Medical History:   Past Medical History:   Diagnosis Date     Advanced open-angle glaucoma      Atrial fibrillation (H)      CKD (chronic kidney disease), stage III (H) 2005     Colon cancer (H)     Stage II-B colon cancer     Coronary artery disease     s/p CABG x 2, JEREMY x 2     Diverticulitis      Hyperlipidemia      Hypertension      Ischemic cardiomyopathy      MGUS (monoclonal gammopathy of unknown significance)      Nonsenile cataract     BE     Osteoporosis     left hip fracture     Polymorphic ventricular tachycardia      Polymyalgia rheumatica (H)      PVD (posterior vitreous detachment), both eyes 2005     S/P ablation of atrial flutter 6/20/14    CTI     Stented coronary artery      SVT (supraventricular tachycardia) (H)     PPM/AICD for NSVT     Upper leg DVT (deep venous thromboembolism), chronic (H)      Left     Weight loss, unintentional 2017    15 lb in 4 months     Physical Exam:   Vitals: BP (!) 145/79   Pulse 67   Temp 98.7  F (37.1  C)   Resp 16   Wt 69.5 kg (153 lb 3.2 oz)   SpO2 98%   BMI 23.99 kg/m    BMI: Body mass index is 23.99 kg/m .  GENERAL APPEARANCE:  Alert, in no distress  ENT:  Mouth and posterior oropharynx normal, moist mucous membranes  EYES:  EOM, conjunctivae, lids, pupils and irises normal  RESP:  respiratory effort and palpation of chest normal, lungs clear to auscultation , no respiratory distress  CV:  Palpation and auscultation of heart done , regular rate and rhythm, no murmur, rub, or gallop  ABDOMEN:  normal bowel sounds, soft, nontender, no hepatosplenomegaly or other masses  M/S:   Trace edema on BLE  SKIN:  Inspection of skin and subcutaneous tissue baseline, Palpation of skin and subcutaneous tissue baseline  NEURO:   Cranial nerves 2-12 are normal tested and grossly at patient's baseline  PSYCH:  memory impaired      SNF labs: Labs done in SNF are in [place] EPIC. Please refer to them using [employer] Everywhere.    DISCHARGE PLAN:    Follow up labs: INR on 12/29/22    Medical Follow Up:      Follow up with primary care provider in 1 week      Discharge Services: Home Care:  Occupational Therapy, Physical Therapy, Registered Nurse, Home Health Aide and From:  [facility]    TOTAL DISCHARGE TIME:   Greater than 30 minutes  Electronically signed by:  MARIA LUISA Morataya CNP         Documentation of Face-to-Face and Certification for Home Health Services     Patient: Noe Florence   YOB: 1935  MR Number: [id number]  Today's Date: 12/27/2022    I certify that patient: Noe Florence is under my care and that I, or a nurse practitioner or physician's assistant working with me, had a face-to-face encounter that meets the physician face-to-face encounter requirements with this patient on: 12/26/22.    This encounter with the patient was in whole, or in  part, for the following medical condition, which is the primary reason for home health care: Recurrent falls.    I certify that, based on my findings, the following services are medically necessary home health services: Nursing, Occupational Therapy and Physical Therapy.    My clinical findings support the need for the above services because: Nurse is needed: To assess blood pressures, INR after changes in medications or other medical regimen.., Occupational Therapy Services are needed to assess and treat cognitive ability and address ADL safety due to impairment in cogntion. and Physical Therapy Services are needed to assess and treat the following functional impairments: Recurrent falls.    Further, I certify that my clinical findings support that this patient is homebound (i.e. absences from home require considerable and taxing effort and are for medical reasons or [employer]ast[employer]ism services or infrequently or of short duration when for other reasons) because: Requires assistance of another person or specialized equipment to access medical services because patient: Is prone to wander/get lost without assistance...    Based on the above findings. I certify that this patient is confined to the home and needs intermittent skilled nursing care, physical therapy and/or speech therapy.  The patient is under my care, and I have initiated the establishment of the plan of care.  This patient will be followed by a physician who will periodically review the plan of care.  Physician/Provider to provide follow up care: Roberto Sarmiento    Responsible Medicare certified [place] Physician: Dr. Renay Srinivasan  Physician Signature: See electronic signature associated with these discharge orders.  Date: 12/26/2022              Sincerely,        MARIA LUISA Morataya CNP

## 2022-12-26 NOTE — PROGRESS NOTES
[facility] GERIATRICS DISCHARGE SUMMARY  PATIENT'S NAME: Noe Florence  YOB: 1935  MEDICAL RECORD NUMBER:  [id number]  Place of Service where encounter took place:  [facility] ([facility]) [[id number]]    PRIMARY CARE PROVIDER AND CLINIC RESPONSIBLE AFTER TRANSFER:   Roberto Sarmeinto MD, [address] [facility] [address]    [facility] Provider     Transferring providers: Mili Bajwa, MARIA LUISA SANCHES, Renay Srinivasan DO  Date of SNF Admission: 11/15/22  Date of SNF (anticipated) Discharge: 12/28/22  Discharged to: previous independent home  Cognitive Scores: SLUMS Score 6/30. Safety Questionnaire 5.5/19  Physical Function: Maximum assistance needed with transfers. Ambulating 100 feet with FWW and minimal assistance. Maximum assistance needed with dressing and toileting.  DME: No new DME needed    CODE STATUS/ADVANCE DIRECTIVES DISCUSSION:  Full Code   ALLERGIES: [facility]    NURSING FACILITY COURSE   This is an 87-year-old male, with a past medical history significant for recurrent falls, muscle weakness, peripheral arterial disease, neuropathy, foot flare, degenerative joint disease of the left knee, atrial fibrillation on anticoagulation, hypertension, hyperlipidemia, coronary artery disease s/p CABG x 2 with JEREMY, V Tach s/p ICD, anemia, DVT, depression, and colon cancer s/p laparoscopic resection 2019, who was admitted to [facility] from home after having recurrent falls.    Cognitive Impairment. SLUMS 6/30. Lived at home with significant other prior to admission. Will return home with significant other, home care, and PCA services.     Constipation. Continue Senna as ordered.     Atrial Fibrillation on Anticoagulation, History of Recurrent DVT, and History of V. Tach S/P ICD Placement. INR goal 1.5-2.5 due to history of falls and bleed. Most recent home dose per [facility] Warfarin 2.5 mg on Tu, Th, Sun and 5 mg all other days. Next INR 12/29/22. Takes Metoprolol. Has had intermittent  episodes of mild bradycardia since TCU admission, upper 50s. Seen by Cardiology on 12/5/22.     Date INR New Dose/Orders   11/16.22 1.6 Warfarin 2.5 mg on Tu, Th, Sun and 5 mg PO QOD   11/18/22 1.6 Warfarin 5 mg PO every day    11/21/22 2.4 Warfarin 2.5 mg PO on Tu, Th, Sun and 5 mg PO AOD   11/25/22 1.8 Warfarin 2.5 mg PO on Tu, Th, Sun and 5 mg PO AOD   12/1/22 2.1 Warfarin 2.5 mg PO on Tu, Th, Sun and 5 mg PO AOD   12/8/22 1.7 Warfarin 2.5 mg PO on Tu, Th, Sun and 5 mg PO AOD   12/19/22 2.5 Warfarin 2.5 mg PO on Tu, Th, Sun and 5 mg PO AOD     Recurrent Falls. With legs giving out per patient and significant other's report. Last fall on 12/23/22. Found on the floor. No injury. Home Physical and Occupational Therapy ordered. Also has PCA Services at home.     Neuropathy, Chronic Venous Insufficiency, Bilateral Lower Extremity Edema, and Left Foot Drop. Related to above.      Coronary Artery Disease S/P CABG x 2 with JEREMY, Hypertension, and Hyperlipidemia. Followed by [facility]. Last seen 12/5/22. Taken off Aspirin due to recurrent falls. No longer on Atorvastatin due to concern for myalgias. Takes Metoprolol. Upon review of blood pressures over the past 5 days, blood pressures labile with systolic range from 101-175. Diastolic range from 54-87. Home Health to follow. Will not make any changes at this time given risk of hypotension and falls.     Anemia. Baseline Hemoglobin ~ 10-11. Last Hemoglobin 10.0 on 12/23/22. Takes Ferrous Sulfate.      Chronic Kidney Disease Stage III. Baseline Creatinine mid 1s. Last Creatinine 1.42 on 12/23/22. Monitor periodically.     Depression. Followed psychiatrist, Dr. Naren Stanton. Ariprazole discontinued and Risperdal initiated on 12/17/22. Taking Mirtazapine and Sertraline.    Left Heel Wound. Noted on 12/7/22 documentation. Has heel protectors. Significant Other requests compression stockings per worn despite wound on heel. Education provided by staff.      Osteoporosis. Started on scheduled Acetaminophen on 12/1/22. Continue Alendronate and Cholecalciferol as ordered.     Urinary Incontinence. Continue Tamsulosin as ordered.     History of Colon Cancer S/P Laparoscopic Resection 2019. Followed by Oncology. Last seen 10/3/22.     Elevated MMA. On high dose Vitamin B12 supplementation.      Tinea Pedis and Stasis Dermatitis. Has various lotions ordered for bilateral lower extremities and feet.     Polymyalgia Rheumatica. Noted in history. Requires no medication.      Physical Deconditioning. Home Physical and Occupational Therapy ordered.    Discharge Medications:  Current Outpatient Medications   Medication Sig Dispense Refill     acetaminophen (TYLENOL) 500 MG tablet Take 1,000 mg by mouth 2 times daily And every day PRN       alendronate (FOSAMAX) 70 MG tablet Take 1 tablet (70 mg) by mouth every 7 days Take with a full glass of water and do not eat or lay down for 30 minutes 12 tablet 3     ARIPiprazole (ABILIFY) 5 MG tablet Take 5 mg by mouth daily       bacitracin 500 UNIT/GM ophthalmic ointment 1 application, topical, Once A Day, Apply to open areas on right great to and right second toe and cover with dressing daily (has pressure area to toes)       calcium carbonate 500 mg, elemental, (OSCAL;OYSTER SHELL CALCIUM) 500 MG tablet Take 1 tablet (500 mg) by mouth 2 times daily 180 tablet 3     cholecalciferol 1000 units TABS Take 1,000 Units by mouth 2 times daily       COMPRESSION STOCKINGS 1 each daily 1 each 4     cyanocobalamin (VITAMIN B-12) 1000 MCG tablet Take 4 daily 400 tablet 3     Emollient (CERAVE SA RENEWING) LOTN 1 application, topical, Once A Day, Apply to torso       Emollient (CERAVE) CREA 1 application, topical, Once A Day, Apply to bilateral legs       ferrous sulfate (FE TABS) 325 (65 Fe) MG EC tablet Take 325 mg by mouth daily       ketoconazole (NIZORAL) 2 % external cream Apply to affected areas on chest and feet daily as needed 60 g 6      ketoconazole (NIZORAL) 2 % external cream Apply to the feet 2 times a day until rash clears then 3-4 times a week for maintenance 60 g 11     ketoconazole (NIZORAL) 2 % external shampoo Apply to scalp daily as needed until flaking resolves 120 mL 1     metoprolol tartrate (LOPRESSOR) 25 MG tablet Take 1 tablet (25 mg) by mouth 2 times daily 180 tablet 3     mirtazapine (REMERON) 15 MG tablet Take 15 mg by mouth At Bedtime.       Multiple Vitamins-Minerals (MULTIVITAMIN ADULT PO)        sennosides (SENOKOT) 8.6 MG tablet Take 1 tablet by mouth 2 times daily       sertraline (ZOLOFT) 100 MG tablet Take 200 mg by mouth every evening       tamsulosin (FLOMAX) 0.4 MG capsule TAKE 2 CAPSULES (0.8 MG) BY MOUTH DAILY 180 capsule 2     urea (GORMEL) 20 % external cream Apply topically 2 times daily Apply to bilateral heels BID       warfarin ANTICOAGULANT (JANTOVEN ANTICOAGULANT) 5 MG tablet TAKE ONE TABLET BY MOUTH DAILY OR AS DIRECTED BY THE COUMADIN CLINIC 90 tablet 1      Controlled medications:   not applicable/none     Past Medical History:   Past Medical History:   Diagnosis Date     Advanced open-angle glaucoma      Atrial fibrillation (H)      CKD (chronic kidney disease), stage III (H) 2005     Colon cancer (H)     Stage II-B colon cancer     Coronary artery disease     s/p CABG x 2, JEREMY x 2     Diverticulitis      Hyperlipidemia      Hypertension      Ischemic cardiomyopathy      MGUS (monoclonal gammopathy of unknown significance)      Nonsenile cataract     BE     Osteoporosis     left hip fracture     Polymorphic ventricular tachycardia      Polymyalgia rheumatica (H)      PVD (posterior vitreous detachment), both eyes 2005     S/P ablation of atrial flutter 6/20/14    CTI     Stented coronary artery      SVT (supraventricular tachycardia) (H)     PPM/AICD for NSVT     Upper leg DVT (deep venous thromboembolism), chronic (H)     Left     Weight loss, unintentional 2017    15 lb in 4 months     Physical Exam:    Vitals: BP (!) 145/79   Pulse 67   Temp 98.7  F (37.1  C)   Resp 16   Wt 69.5 kg (153 lb 3.2 oz)   SpO2 98%   BMI 23.99 kg/m    BMI: Body mass index is 23.99 kg/m .  GENERAL APPEARANCE:  Alert, in no distress  ENT:  Mouth and posterior oropharynx normal, moist mucous membranes  EYES:  EOM, conjunctivae, lids, pupils and irises normal  RESP:  respiratory effort and palpation of chest normal, lungs clear to auscultation , no respiratory distress  CV:  Palpation and auscultation of heart done , regular rate and rhythm, no murmur, rub, or gallop  ABDOMEN:  normal bowel sounds, soft, nontender, no hepatosplenomegaly or other masses  M/S:   Trace edema on BLE  SKIN:  Inspection of skin and subcutaneous tissue baseline, Palpation of skin and subcutaneous tissue baseline  NEURO:   Cranial nerves 2-12 are normal tested and grossly at patient's baseline  PSYCH:  memory impaired      SNF labs: Labs done in SNF are in [place] EPIC. Please refer to them using [employer] Everywhere.    DISCHARGE PLAN:    Follow up labs: INR on 12/29/22    Medical Follow Up:      Follow up with primary care provider in 1 week      Discharge Services: Home Care:  Occupational Therapy, Physical Therapy, Registered Nurse, Home Health Aide and From:  [facility]    TOTAL DISCHARGE TIME:   Greater than 30 minutes  Electronically signed by:  MARIA LUISA Morataya CNP         Documentation of Face-to-Face and Certification for Home Health Services     Patient: Noe Florence   YOB: 1935  MR Number: [id number]  Today's Date: 12/27/2022    I certify that patient: Noe Florence is under my care and that I, or a nurse practitioner or physician's assistant working with me, had a face-to-face encounter that meets the physician face-to-face encounter requirements with this patient on: 12/26/22.    This encounter with the patient was in whole, or in part, for the following medical condition, which is the primary reason for home  health care: Recurrent falls.    I certify that, based on my findings, the following services are medically necessary home health services: Nursing, Occupational Therapy and Physical Therapy.    My clinical findings support the need for the above services because: Nurse is needed: To assess blood pressures, INR after changes in medications or other medical regimen.., Occupational Therapy Services are needed to assess and treat cognitive ability and address ADL safety due to impairment in cogntion. and Physical Therapy Services are needed to assess and treat the following functional impairments: Recurrent falls.    Further, I certify that my clinical findings support that this patient is homebound (i.e. absences from home require considerable and taxing effort and are for medical reasons or [employer]ond[employer] services or infrequently or of short duration when for other reasons) because: Requires assistance of another person or specialized equipment to access medical services because patient: Is prone to wander/get lost without assistance...    Based on the above findings. I certify that this patient is confined to the home and needs intermittent skilled nursing care, physical therapy and/or speech therapy.  The patient is under my care, and I have initiated the establishment of the plan of care.  This patient will be followed by a physician who will periodically review the plan of care.  Physician/Provider to provide follow up care: Roberto Sarmiento    Responsible Medicare certified [place] Physician: Dr. Renay Srinivasan  Physician Signature: See electronic signature associated with these discharge orders.  Date: 12/26/2022

## 2022-12-27 ENCOUNTER — DOCUMENTATION ONLY (OUTPATIENT)
Dept: ANTICOAGULATION | Facility: CLINIC | Age: 87
End: 2022-12-27

## 2022-12-27 DIAGNOSIS — I82.409 DEEP VEIN THROMBOSIS (DVT) (H): ICD-10-CM

## 2022-12-27 DIAGNOSIS — Z79.01 LONG TERM CURRENT USE OF ANTICOAGULANT THERAPY: ICD-10-CM

## 2022-12-27 DIAGNOSIS — I48.91 ATRIAL FIBRILLATION, UNSPECIFIED TYPE (H): Primary | ICD-10-CM

## 2022-12-27 RX ORDER — RISPERIDONE 1 MG/1
1 TABLET ORAL AT BEDTIME
COMMUNITY
End: 2023-01-01

## 2022-12-27 NOTE — PROGRESS NOTES
ANTICOAGULATION  MANAGEMENT: Discharge Review    Noe Florence chart reviewed for anticoagulation continuity of care    [facility] TCU on 11/15-12/27 for Recurrent Falls.    Discharge disposition: Home with Home Care: [facility] [place] [phone]    Results:    No results for input(s): INR, [id number], F2, ALMWH, AAUFH in the last 168 hours.  Anticoagulation inpatient management:     home regimen continued    Anticoagulation discharge instructions:     Warfarin dosing: home regimen continued   Bridging: No   INR goal change: No      Medication changes affecting anticoagulation: No    Additional factors affecting anticoagulation: Yes: chronic falls, but is getting in-home OT and PT     PLAN     No adjustment to anticoagulation plan needed    Patient not contacted    Anticoagulation Calendar updated    Nguyen Zuniga RN

## 2022-12-29 ENCOUNTER — TELEPHONE (OUTPATIENT)
Dept: ANTICOAGULATION | Facility: CLINIC | Age: 87
End: 2022-12-29

## 2022-12-29 ENCOUNTER — DOCUMENTATION ONLY (OUTPATIENT)
Dept: GERIATRICS | Facility: CLINIC | Age: 87
End: 2022-12-29

## 2022-12-29 DIAGNOSIS — I48.91 ATRIAL FIBRILLATION, UNSPECIFIED TYPE (H): Primary | ICD-10-CM

## 2022-12-29 DIAGNOSIS — I82.409 DEEP VEIN THROMBOSIS (DVT) (H): ICD-10-CM

## 2022-12-29 DIAGNOSIS — Z79.01 LONG TERM CURRENT USE OF ANTICOAGULANT THERAPY: ICD-10-CM

## 2022-12-29 NOTE — TELEPHONE ENCOUNTER
12/29/22    Returned Rica's call from this morning.  They are set with dosing.  No changes to calendar.  [facility] will draw an INR at home visit on Monday.    BYRON Adame

## 2022-12-30 NOTE — PROGRESS NOTES
[facility]   2022     Name: Noe Florence   : 1935     Background:  Patient discharged from TCU on 22. Home Care is unable to complete INR ordered on 22. Significant Other reported to facility she gave 2.5 mg of Coumadin on 22.    Orders Communicated to Significant Other by TCU Staff:    Adjust dosing to Warfarin 2.5 mg on Sun and 5 mg PO every other day     Recheck INR on 22    Electronically signed by MARIA LUISA Morataya CNP

## 2022-12-30 NOTE — PLAN OF CARE
Date of Surgery Update:  Gina Wu was seen, history and physical examination reviewed, and patient examined by me today. There have been no significant clinical changes since the completion of the previous history and physical.    The nature of the procedure, possible complications, alternative forms of therapy, post-op course, and post-op goals have been explained to patient (or appropriate guardian) and patient's family and understanding was verbalized. All questions have been answered. The consent has been signed. Patient's surgical site has been marked. Patient wishes to proceed with surgery.     Discussed with Dr. Luis Holman DPM.    Fidelia Prince, [facility]  12/30/22  12:58 PM FOCUS/GOAL  Bowel management, Bladder management and Medical management    ASSESSMENT, INTERVENTIONS AND CONTINUING PLAN FOR GOAL:  Patient is alert and oriented. Makes needs known. Assist of 1 w/ walker. Pain managed w/ scheduled Tylenol and Lidocaine patch on L thigh. Incisions on L leg are WDL and open to air due to no drainage noted. Continent of bowel, last BM 3/23/19. Incontinent of bladder, soiled clothing x1 and clothes are currently being washed. UA ordered for patient, needing to be collected. PIV in R arm patent. Continue with plan of care.

## 2023-01-01 ENCOUNTER — ANTICOAGULATION THERAPY VISIT (OUTPATIENT)
Dept: ANTICOAGULATION | Facility: CLINIC | Age: 88
End: 2023-01-01

## 2023-01-01 ENCOUNTER — TELEPHONE (OUTPATIENT)
Dept: INTERNAL MEDICINE | Facility: CLINIC | Age: 88
End: 2023-01-01
Payer: COMMERCIAL

## 2023-01-01 ENCOUNTER — TELEPHONE (OUTPATIENT)
Dept: CARDIOLOGY | Facility: CLINIC | Age: 88
End: 2023-01-01

## 2023-01-01 ENCOUNTER — TELEPHONE (OUTPATIENT)
Dept: ANTICOAGULATION | Facility: CLINIC | Age: 88
End: 2023-01-01

## 2023-01-01 ENCOUNTER — TRANSFERRED RECORDS (OUTPATIENT)
Dept: HEALTH INFORMATION MANAGEMENT | Facility: CLINIC | Age: 88
End: 2023-01-01
Payer: COMMERCIAL

## 2023-01-01 ENCOUNTER — ANTICOAGULATION THERAPY VISIT (OUTPATIENT)
Dept: ANTICOAGULATION | Facility: CLINIC | Age: 88
End: 2023-01-01
Payer: COMMERCIAL

## 2023-01-01 ENCOUNTER — VIRTUAL VISIT (OUTPATIENT)
Dept: CARDIOLOGY | Facility: CLINIC | Age: 88
End: 2023-01-01
Payer: COMMERCIAL

## 2023-01-01 ENCOUNTER — OFFICE VISIT (OUTPATIENT)
Dept: INTERNAL MEDICINE | Facility: CLINIC | Age: 88
End: 2023-01-01
Payer: COMMERCIAL

## 2023-01-01 ENCOUNTER — DOCUMENTATION ONLY (OUTPATIENT)
Dept: INTERNAL MEDICINE | Facility: CLINIC | Age: 88
End: 2023-01-01
Payer: COMMERCIAL

## 2023-01-01 ENCOUNTER — APPOINTMENT (OUTPATIENT)
Dept: PHYSICAL THERAPY | Facility: CLINIC | Age: 88
DRG: 539 | End: 2023-01-01
Payer: COMMERCIAL

## 2023-01-01 ENCOUNTER — TELEPHONE (OUTPATIENT)
Dept: ANTICOAGULATION | Facility: CLINIC | Age: 88
End: 2023-01-01
Payer: COMMERCIAL

## 2023-01-01 ENCOUNTER — TELEPHONE (OUTPATIENT)
Dept: DERMATOLOGY | Facility: CLINIC | Age: 88
End: 2023-01-01
Payer: COMMERCIAL

## 2023-01-01 ENCOUNTER — MEDICAL CORRESPONDENCE (OUTPATIENT)
Dept: HEALTH INFORMATION MANAGEMENT | Facility: CLINIC | Age: 88
End: 2023-01-01

## 2023-01-01 ENCOUNTER — OFFICE VISIT (OUTPATIENT)
Dept: VASCULAR SURGERY | Facility: CLINIC | Age: 88
End: 2023-01-01
Payer: COMMERCIAL

## 2023-01-01 ENCOUNTER — DOCUMENTATION ONLY (OUTPATIENT)
Dept: OTHER | Facility: CLINIC | Age: 88
End: 2023-01-01
Payer: COMMERCIAL

## 2023-01-01 ENCOUNTER — LAB (OUTPATIENT)
Dept: LAB | Facility: CLINIC | Age: 88
End: 2023-01-01
Payer: COMMERCIAL

## 2023-01-01 ENCOUNTER — APPOINTMENT (OUTPATIENT)
Dept: GENERAL RADIOLOGY | Facility: CLINIC | Age: 88
DRG: 539 | End: 2023-01-01
Attending: INTERNAL MEDICINE
Payer: COMMERCIAL

## 2023-01-01 ENCOUNTER — MYC MEDICAL ADVICE (OUTPATIENT)
Dept: VASCULAR SURGERY | Facility: CLINIC | Age: 88
End: 2023-01-01
Payer: COMMERCIAL

## 2023-01-01 ENCOUNTER — OFFICE VISIT (OUTPATIENT)
Dept: NEUROLOGY | Facility: CLINIC | Age: 88
End: 2023-01-01
Payer: COMMERCIAL

## 2023-01-01 ENCOUNTER — DOCUMENTATION ONLY (OUTPATIENT)
Dept: ANTICOAGULATION | Facility: CLINIC | Age: 88
End: 2023-01-01

## 2023-01-01 ENCOUNTER — APPOINTMENT (OUTPATIENT)
Dept: GENERAL RADIOLOGY | Facility: CLINIC | Age: 88
DRG: 539 | End: 2023-01-01
Attending: EMERGENCY MEDICINE
Payer: COMMERCIAL

## 2023-01-01 ENCOUNTER — OFFICE VISIT (OUTPATIENT)
Dept: ORTHOPEDICS | Facility: CLINIC | Age: 88
End: 2023-01-01
Attending: INTERNAL MEDICINE
Payer: COMMERCIAL

## 2023-01-01 ENCOUNTER — TELEPHONE (OUTPATIENT)
Dept: VASCULAR SURGERY | Facility: CLINIC | Age: 88
End: 2023-01-01
Payer: COMMERCIAL

## 2023-01-01 ENCOUNTER — OFFICE VISIT (OUTPATIENT)
Dept: OPHTHALMOLOGY | Facility: CLINIC | Age: 88
End: 2023-01-01
Attending: OPHTHALMOLOGY
Payer: COMMERCIAL

## 2023-01-01 ENCOUNTER — DOCUMENTATION ONLY (OUTPATIENT)
Dept: ANTICOAGULATION | Facility: CLINIC | Age: 88
End: 2023-01-01
Payer: COMMERCIAL

## 2023-01-01 ENCOUNTER — ANCILLARY PROCEDURE (OUTPATIENT)
Dept: GENERAL RADIOLOGY | Facility: CLINIC | Age: 88
End: 2023-01-01
Attending: PREVENTIVE MEDICINE
Payer: COMMERCIAL

## 2023-01-01 ENCOUNTER — ANCILLARY PROCEDURE (OUTPATIENT)
Dept: ULTRASOUND IMAGING | Facility: CLINIC | Age: 88
End: 2023-01-01
Attending: HOSPITALIST
Payer: COMMERCIAL

## 2023-01-01 ENCOUNTER — PATIENT OUTREACH (OUTPATIENT)
Dept: CARE COORDINATION | Facility: CLINIC | Age: 88
End: 2023-01-01
Payer: COMMERCIAL

## 2023-01-01 ENCOUNTER — ANCILLARY PROCEDURE (OUTPATIENT)
Dept: CARDIOLOGY | Facility: CLINIC | Age: 88
End: 2023-01-01
Attending: INTERNAL MEDICINE
Payer: COMMERCIAL

## 2023-01-01 ENCOUNTER — PRE VISIT (OUTPATIENT)
Dept: ORTHOPEDICS | Facility: CLINIC | Age: 88
End: 2023-01-01

## 2023-01-01 ENCOUNTER — ONCOLOGY VISIT (OUTPATIENT)
Dept: ONCOLOGY | Facility: CLINIC | Age: 88
End: 2023-01-01
Attending: NURSE PRACTITIONER
Payer: COMMERCIAL

## 2023-01-01 ENCOUNTER — INFUSION THERAPY VISIT (OUTPATIENT)
Dept: INFUSION THERAPY | Facility: CLINIC | Age: 88
End: 2023-01-01
Attending: SURGERY
Payer: COMMERCIAL

## 2023-01-01 ENCOUNTER — HOSPITAL ENCOUNTER (INPATIENT)
Facility: CLINIC | Age: 88
LOS: 13 days | Discharge: HOME HEALTH | DRG: 539 | End: 2023-12-21
Attending: EMERGENCY MEDICINE | Admitting: STUDENT IN AN ORGANIZED HEALTH CARE EDUCATION/TRAINING PROGRAM
Payer: COMMERCIAL

## 2023-01-01 ENCOUNTER — APPOINTMENT (OUTPATIENT)
Dept: LAB | Facility: CLINIC | Age: 88
End: 2023-01-01
Attending: NURSE PRACTITIONER
Payer: COMMERCIAL

## 2023-01-01 ENCOUNTER — OFFICE VISIT (OUTPATIENT)
Dept: ORTHOPEDICS | Facility: CLINIC | Age: 88
End: 2023-01-01
Payer: COMMERCIAL

## 2023-01-01 ENCOUNTER — APPOINTMENT (OUTPATIENT)
Dept: SPEECH THERAPY | Facility: CLINIC | Age: 88
DRG: 539 | End: 2023-01-01
Attending: INTERNAL MEDICINE
Payer: COMMERCIAL

## 2023-01-01 ENCOUNTER — TELEPHONE (OUTPATIENT)
Dept: VASCULAR SURGERY | Facility: CLINIC | Age: 88
End: 2023-01-01

## 2023-01-01 ENCOUNTER — ANCILLARY PROCEDURE (OUTPATIENT)
Dept: CT IMAGING | Facility: CLINIC | Age: 88
End: 2023-01-01
Attending: SURGERY
Payer: COMMERCIAL

## 2023-01-01 ENCOUNTER — TRANSFERRED RECORDS (OUTPATIENT)
Dept: HEALTH INFORMATION MANAGEMENT | Facility: CLINIC | Age: 88
End: 2023-01-01

## 2023-01-01 ENCOUNTER — APPOINTMENT (OUTPATIENT)
Dept: CT IMAGING | Facility: CLINIC | Age: 88
DRG: 539 | End: 2023-01-01
Payer: COMMERCIAL

## 2023-01-01 VITALS
TEMPERATURE: 98.7 F | SYSTOLIC BLOOD PRESSURE: 118 MMHG | HEART RATE: 62 BPM | OXYGEN SATURATION: 99 % | DIASTOLIC BLOOD PRESSURE: 58 MMHG | RESPIRATION RATE: 16 BRPM

## 2023-01-01 VITALS — DIASTOLIC BLOOD PRESSURE: 68 MMHG | SYSTOLIC BLOOD PRESSURE: 152 MMHG | HEART RATE: 62 BPM | OXYGEN SATURATION: 98 %

## 2023-01-01 VITALS
RESPIRATION RATE: 16 BRPM | SYSTOLIC BLOOD PRESSURE: 131 MMHG | OXYGEN SATURATION: 100 % | DIASTOLIC BLOOD PRESSURE: 59 MMHG | HEIGHT: 67 IN | WEIGHT: 155 LBS | BODY MASS INDEX: 24.33 KG/M2 | TEMPERATURE: 97.6 F | HEART RATE: 75 BPM

## 2023-01-01 VITALS
OXYGEN SATURATION: 99 % | BODY MASS INDEX: 23.99 KG/M2 | HEART RATE: 60 BPM | HEIGHT: 67 IN | SYSTOLIC BLOOD PRESSURE: 119 MMHG | DIASTOLIC BLOOD PRESSURE: 76 MMHG

## 2023-01-01 VITALS — DIASTOLIC BLOOD PRESSURE: 68 MMHG | HEART RATE: 62 BPM | OXYGEN SATURATION: 98 % | SYSTOLIC BLOOD PRESSURE: 115 MMHG

## 2023-01-01 VITALS — OXYGEN SATURATION: 100 % | DIASTOLIC BLOOD PRESSURE: 61 MMHG | SYSTOLIC BLOOD PRESSURE: 136 MMHG | HEART RATE: 60 BPM

## 2023-01-01 VITALS — SYSTOLIC BLOOD PRESSURE: 138 MMHG | OXYGEN SATURATION: 94 % | HEART RATE: 63 BPM | DIASTOLIC BLOOD PRESSURE: 74 MMHG

## 2023-01-01 VITALS
HEART RATE: 65 BPM | RESPIRATION RATE: 18 BRPM | OXYGEN SATURATION: 98 % | DIASTOLIC BLOOD PRESSURE: 79 MMHG | TEMPERATURE: 97.8 F | SYSTOLIC BLOOD PRESSURE: 143 MMHG

## 2023-01-01 VITALS — HEART RATE: 62 BPM | SYSTOLIC BLOOD PRESSURE: 108 MMHG | TEMPERATURE: 97.3 F | DIASTOLIC BLOOD PRESSURE: 58 MMHG

## 2023-01-01 DIAGNOSIS — R30.0 DYSURIA: Primary | ICD-10-CM

## 2023-01-01 DIAGNOSIS — N18.31 STAGE 3A CHRONIC KIDNEY DISEASE (H): Primary | ICD-10-CM

## 2023-01-01 DIAGNOSIS — L03.116 CELLULITIS OF LEFT LOWER EXTREMITY: ICD-10-CM

## 2023-01-01 DIAGNOSIS — I82.409 DEEP VEIN THROMBOSIS (DVT) (H): ICD-10-CM

## 2023-01-01 DIAGNOSIS — R26.9 GAIT DISORDER: Primary | ICD-10-CM

## 2023-01-01 DIAGNOSIS — N28.9 IMPAIRED RENAL FUNCTION: ICD-10-CM

## 2023-01-01 DIAGNOSIS — Z79.01 LONG TERM CURRENT USE OF ANTICOAGULANT THERAPY: ICD-10-CM

## 2023-01-01 DIAGNOSIS — I48.91 ATRIAL FIBRILLATION, UNSPECIFIED TYPE (H): Primary | ICD-10-CM

## 2023-01-01 DIAGNOSIS — M25.561 RIGHT KNEE PAIN: ICD-10-CM

## 2023-01-01 DIAGNOSIS — I48.91 ATRIAL FIBRILLATION, UNSPECIFIED TYPE (H): ICD-10-CM

## 2023-01-01 DIAGNOSIS — Z95.810 ICD (IMPLANTABLE CARDIOVERTER-DEFIBRILLATOR) IN PLACE: ICD-10-CM

## 2023-01-01 DIAGNOSIS — I47.20 VENTRICULAR TACHYARRHYTHMIA (H): ICD-10-CM

## 2023-01-01 DIAGNOSIS — I48.20 CHRONIC ATRIAL FIBRILLATION (H): ICD-10-CM

## 2023-01-01 DIAGNOSIS — I73.9 PAD (PERIPHERAL ARTERY DISEASE) (H): ICD-10-CM

## 2023-01-01 DIAGNOSIS — H40.1131 PRIMARY OPEN ANGLE GLAUCOMA (POAG) OF BOTH EYES, MILD STAGE: ICD-10-CM

## 2023-01-01 DIAGNOSIS — H61.21 IMPACTED CERUMEN OF RIGHT EAR: ICD-10-CM

## 2023-01-01 DIAGNOSIS — H04.123 BILATERAL DRY EYES: ICD-10-CM

## 2023-01-01 DIAGNOSIS — I73.9 PAD (PERIPHERAL ARTERY DISEASE) (H): Primary | ICD-10-CM

## 2023-01-01 DIAGNOSIS — R53.81 DEBILITY: ICD-10-CM

## 2023-01-01 DIAGNOSIS — D64.9 ANEMIA, UNSPECIFIED TYPE: ICD-10-CM

## 2023-01-01 DIAGNOSIS — M17.0 ARTHRITIS OF BOTH KNEES: ICD-10-CM

## 2023-01-01 DIAGNOSIS — I87.2 VENOUS (PERIPHERAL) INSUFFICIENCY: Primary | ICD-10-CM

## 2023-01-01 DIAGNOSIS — M86.172 OTHER ACUTE OSTEOMYELITIS OF LEFT FOOT (H): ICD-10-CM

## 2023-01-01 DIAGNOSIS — H61.23 BILATERAL IMPACTED CERUMEN: ICD-10-CM

## 2023-01-01 DIAGNOSIS — M79.604 BILATERAL LEG PAIN: Primary | ICD-10-CM

## 2023-01-01 DIAGNOSIS — Z79.01 LONG TERM CURRENT USE OF ANTICOAGULANT THERAPY: Primary | ICD-10-CM

## 2023-01-01 DIAGNOSIS — M21.962 DEFORMITY OF BOTH FEET: ICD-10-CM

## 2023-01-01 DIAGNOSIS — I25.10 CORONARY ARTERY DISEASE INVOLVING NATIVE HEART WITHOUT ANGINA PECTORIS, UNSPECIFIED VESSEL OR LESION TYPE: ICD-10-CM

## 2023-01-01 DIAGNOSIS — M17.9 OSTEOARTHRITIS OF KNEE, UNSPECIFIED LATERALITY, UNSPECIFIED OSTEOARTHRITIS TYPE: ICD-10-CM

## 2023-01-01 DIAGNOSIS — N18.31 STAGE 3A CHRONIC KIDNEY DISEASE (H): ICD-10-CM

## 2023-01-01 DIAGNOSIS — I72.3 ILIAC ANEURYSM (H): ICD-10-CM

## 2023-01-01 DIAGNOSIS — I87.2 VENOUS (PERIPHERAL) INSUFFICIENCY: ICD-10-CM

## 2023-01-01 DIAGNOSIS — K59.09 OTHER CONSTIPATION: ICD-10-CM

## 2023-01-01 DIAGNOSIS — I48.0 PAROXYSMAL ATRIAL FIBRILLATION (H): Primary | ICD-10-CM

## 2023-01-01 DIAGNOSIS — R79.89 HIGH SERUM METHYLMALONATE: ICD-10-CM

## 2023-01-01 DIAGNOSIS — N39.0 URINARY TRACT INFECTION WITHOUT HEMATURIA, SITE UNSPECIFIED: ICD-10-CM

## 2023-01-01 DIAGNOSIS — M25.561 RIGHT KNEE PAIN: Primary | ICD-10-CM

## 2023-01-01 DIAGNOSIS — M17.9 OSTEOARTHRITIS OF KNEE, UNSPECIFIED LATERALITY, UNSPECIFIED OSTEOARTHRITIS TYPE: Primary | ICD-10-CM

## 2023-01-01 DIAGNOSIS — I48.0 PAROXYSMAL ATRIAL FIBRILLATION (H): ICD-10-CM

## 2023-01-01 DIAGNOSIS — M21.961 DEFORMITY OF BOTH FEET: ICD-10-CM

## 2023-01-01 DIAGNOSIS — D47.2 MONOCLONAL PARAPROTEINEMIA: ICD-10-CM

## 2023-01-01 DIAGNOSIS — U07.1 INFECTION DUE TO 2019 NOVEL CORONAVIRUS: Primary | ICD-10-CM

## 2023-01-01 DIAGNOSIS — R35.0 URINARY FREQUENCY: ICD-10-CM

## 2023-01-01 DIAGNOSIS — I48.20 CHRONIC ATRIAL FIBRILLATION (H): Primary | ICD-10-CM

## 2023-01-01 DIAGNOSIS — M86.9 TOE OSTEOMYELITIS (H): ICD-10-CM

## 2023-01-01 DIAGNOSIS — I47.29 NONSUSTAINED VENTRICULAR TACHYCARDIA (H): ICD-10-CM

## 2023-01-01 DIAGNOSIS — M81.0 AGE RELATED OSTEOPOROSIS, UNSPECIFIED PATHOLOGICAL FRACTURE PRESENCE: ICD-10-CM

## 2023-01-01 DIAGNOSIS — N39.0 URINARY TRACT INFECTION WITHOUT HEMATURIA, SITE UNSPECIFIED: Primary | ICD-10-CM

## 2023-01-01 DIAGNOSIS — M17.0 ARTHRITIS OF BOTH KNEES: Primary | ICD-10-CM

## 2023-01-01 DIAGNOSIS — C18.2 MALIGNANT NEOPLASM OF ASCENDING COLON (H): Primary | ICD-10-CM

## 2023-01-01 DIAGNOSIS — H26.493 PCO (POSTERIOR CAPSULAR OPACIFICATION), BILATERAL: ICD-10-CM

## 2023-01-01 DIAGNOSIS — S51.012A SKIN TEAR OF LEFT ELBOW WITHOUT COMPLICATION, INITIAL ENCOUNTER: ICD-10-CM

## 2023-01-01 DIAGNOSIS — N28.9 IMPAIRED RENAL FUNCTION: Primary | ICD-10-CM

## 2023-01-01 DIAGNOSIS — R30.0 DYSURIA: ICD-10-CM

## 2023-01-01 DIAGNOSIS — M79.605 BILATERAL LEG PAIN: Primary | ICD-10-CM

## 2023-01-01 DIAGNOSIS — Z96.1 PSEUDOPHAKIA OF BOTH EYES: Primary | ICD-10-CM

## 2023-01-01 DIAGNOSIS — M86.9 OSTEOMYELITIS OF LEFT FOOT, UNSPECIFIED TYPE (H): ICD-10-CM

## 2023-01-01 DIAGNOSIS — B35.4 TINEA CORPORIS: ICD-10-CM

## 2023-01-01 LAB
ALBUMIN SERPL BCG-MCNC: 3.5 G/DL (ref 3.5–5.2)
ALBUMIN SERPL BCG-MCNC: 3.5 G/DL (ref 3.5–5.2)
ALBUMIN SERPL BCG-MCNC: 3.9 G/DL (ref 3.5–5.2)
ALBUMIN SERPL BCG-MCNC: 4 G/DL (ref 3.5–5.2)
ALBUMIN SERPL ELPH-MCNC: 3.7 G/DL (ref 3.7–5.1)
ALBUMIN UR-MCNC: 20 MG/DL
ALP SERPL-CCNC: 64 U/L (ref 40–150)
ALP SERPL-CCNC: 73 U/L (ref 40–150)
ALP SERPL-CCNC: 81 U/L (ref 40–129)
ALP SERPL-CCNC: 82 U/L (ref 40–129)
ALPHA1 GLOB SERPL ELPH-MCNC: 0.4 G/DL (ref 0.2–0.4)
ALPHA2 GLOB SERPL ELPH-MCNC: 0.8 G/DL (ref 0.5–0.9)
ALT SERPL W P-5'-P-CCNC: 10 U/L (ref 0–70)
ALT SERPL W P-5'-P-CCNC: 11 U/L (ref 10–50)
ALT SERPL W P-5'-P-CCNC: 9 U/L (ref 0–70)
ALT SERPL W P-5'-P-CCNC: 9 U/L (ref 0–70)
ANION GAP SERPL CALCULATED.3IONS-SCNC: 10 MMOL/L (ref 7–15)
ANION GAP SERPL CALCULATED.3IONS-SCNC: 11 MMOL/L (ref 7–15)
ANION GAP SERPL CALCULATED.3IONS-SCNC: 11 MMOL/L (ref 7–15)
ANION GAP SERPL CALCULATED.3IONS-SCNC: 12 MMOL/L (ref 7–15)
ANION GAP SERPL CALCULATED.3IONS-SCNC: 6 MMOL/L (ref 7–15)
ANION GAP SERPL CALCULATED.3IONS-SCNC: 7 MMOL/L (ref 7–15)
ANION GAP SERPL CALCULATED.3IONS-SCNC: 8 MMOL/L (ref 7–15)
ANION GAP SERPL CALCULATED.3IONS-SCNC: 8 MMOL/L (ref 7–15)
APPEARANCE UR: ABNORMAL
APTT PPP: 33 SECONDS (ref 22–38)
AST SERPL W P-5'-P-CCNC: 15 U/L (ref 0–45)
AST SERPL W P-5'-P-CCNC: 16 U/L (ref 0–45)
AST SERPL W P-5'-P-CCNC: 19 U/L (ref 0–45)
AST SERPL W P-5'-P-CCNC: 19 U/L (ref 10–50)
ATRIAL RATE - MUSE: 144 BPM
B-GLOBULIN SERPL ELPH-MCNC: 0.9 G/DL (ref 0.6–1)
BACTERIA #/AREA URNS HPF: ABNORMAL /HPF
BACTERIA BLD CULT: NO GROWTH
BACTERIA UR CULT: ABNORMAL
BASOPHILS # BLD AUTO: 0 10E3/UL (ref 0–0.2)
BASOPHILS NFR BLD AUTO: 0 %
BASOPHILS NFR BLD AUTO: 0 %
BASOPHILS NFR BLD AUTO: 1 %
BASOPHILS NFR BLD AUTO: 1 %
BILIRUB SERPL-MCNC: 0.2 MG/DL
BILIRUB SERPL-MCNC: <0.2 MG/DL
BILIRUB UR QL STRIP: NEGATIVE
BUN SERPL-MCNC: 23.4 MG/DL (ref 8–23)
BUN SERPL-MCNC: 24.5 MG/DL (ref 8–23)
BUN SERPL-MCNC: 24.8 MG/DL (ref 8–23)
BUN SERPL-MCNC: 28.8 MG/DL (ref 8–23)
BUN SERPL-MCNC: 31.9 MG/DL (ref 8–23)
BUN SERPL-MCNC: 32.1 MG/DL (ref 8–23)
BUN SERPL-MCNC: 35.7 MG/DL (ref 8–23)
BUN SERPL-MCNC: 40.9 MG/DL (ref 8–23)
BUN SERPL-MCNC: 45.1 MG/DL (ref 8–23)
BUN SERPL-MCNC: 47.5 MG/DL (ref 8–23)
BUN SERPL-MCNC: 48.9 MG/DL (ref 8–23)
BUN SERPL-MCNC: 49.4 MG/DL (ref 8–23)
BUN SERPL-MCNC: 61.4 MG/DL (ref 8–23)
C PNEUM DNA SPEC QL NAA+PROBE: NOT DETECTED
CALCIUM SERPL-MCNC: 8 MG/DL (ref 8.8–10.2)
CALCIUM SERPL-MCNC: 8.3 MG/DL (ref 8.8–10.2)
CALCIUM SERPL-MCNC: 8.3 MG/DL (ref 8.8–10.2)
CALCIUM SERPL-MCNC: 8.5 MG/DL (ref 8.8–10.2)
CALCIUM SERPL-MCNC: 8.7 MG/DL (ref 8.8–10.2)
CALCIUM SERPL-MCNC: 8.7 MG/DL (ref 8.8–10.2)
CALCIUM SERPL-MCNC: 8.9 MG/DL (ref 8.8–10.2)
CALCIUM SERPL-MCNC: 9 MG/DL (ref 8.8–10.2)
CALCIUM SERPL-MCNC: 9 MG/DL (ref 8.8–10.2)
CALCIUM SERPL-MCNC: 9.2 MG/DL (ref 8.8–10.2)
CALCIUM SERPL-MCNC: 9.5 MG/DL (ref 8.8–10.2)
CALCIUM SERPL-MCNC: 9.6 MG/DL (ref 8.8–10.2)
CALCIUM SERPL-MCNC: 9.7 MG/DL (ref 8.8–10.2)
CEA SERPL-MCNC: 7.5 NG/ML
CHLORIDE SERPL-SCNC: 106 MMOL/L (ref 98–107)
CHLORIDE SERPL-SCNC: 106 MMOL/L (ref 98–107)
CHLORIDE SERPL-SCNC: 108 MMOL/L (ref 98–107)
CHLORIDE SERPL-SCNC: 110 MMOL/L (ref 98–107)
CHLORIDE SERPL-SCNC: 111 MMOL/L (ref 98–107)
CHLORIDE SERPL-SCNC: 113 MMOL/L (ref 98–107)
CHLORIDE SERPL-SCNC: 113 MMOL/L (ref 98–107)
CHLORIDE SERPL-SCNC: 114 MMOL/L (ref 98–107)
CHLORIDE SERPL-SCNC: 114 MMOL/L (ref 98–107)
CK SERPL-CCNC: 30 U/L (ref 39–308)
COLOR UR AUTO: YELLOW
CREAT BLD-MCNC: 1.7 MG/DL (ref 0.7–1.3)
CREAT SERPL-MCNC: 1.29 MG/DL (ref 0.67–1.17)
CREAT SERPL-MCNC: 1.31 MG/DL (ref 0.67–1.17)
CREAT SERPL-MCNC: 1.33 MG/DL (ref 0.67–1.17)
CREAT SERPL-MCNC: 1.4 MG/DL (ref 0.67–1.17)
CREAT SERPL-MCNC: 1.51 MG/DL (ref 0.67–1.17)
CREAT SERPL-MCNC: 1.55 MG/DL (ref 0.67–1.17)
CREAT SERPL-MCNC: 1.56 MG/DL (ref 0.67–1.17)
CREAT SERPL-MCNC: 1.58 MG/DL (ref 0.67–1.17)
CREAT SERPL-MCNC: 1.59 MG/DL (ref 0.67–1.17)
CREAT SERPL-MCNC: 1.6 MG/DL (ref 0.67–1.17)
CREAT SERPL-MCNC: 1.62 MG/DL (ref 0.67–1.17)
CREAT SERPL-MCNC: 1.85 MG/DL (ref 0.67–1.17)
CREAT SERPL-MCNC: 1.88 MG/DL (ref 0.67–1.17)
CRP SERPL-MCNC: 100.29 MG/L
CRP SERPL-MCNC: 14.5 MG/L
CRP SERPL-MCNC: 43.15 MG/L
CRP SERPL-MCNC: 45.76 MG/L
CRP SERPL-MCNC: 8.74 MG/L
D DIMER PPP FEU-MCNC: 2.23 UG/ML FEU (ref 0–0.5)
DEPRECATED HCO3 PLAS-SCNC: 18 MMOL/L (ref 22–29)
DEPRECATED HCO3 PLAS-SCNC: 19 MMOL/L (ref 22–29)
DEPRECATED HCO3 PLAS-SCNC: 20 MMOL/L (ref 22–29)
DEPRECATED HCO3 PLAS-SCNC: 20 MMOL/L (ref 22–29)
DEPRECATED HCO3 PLAS-SCNC: 21 MMOL/L (ref 22–29)
DEPRECATED HCO3 PLAS-SCNC: 21 MMOL/L (ref 22–29)
DEPRECATED HCO3 PLAS-SCNC: 22 MMOL/L (ref 22–29)
DEPRECATED HCO3 PLAS-SCNC: 24 MMOL/L (ref 22–29)
DEPRECATED HCO3 PLAS-SCNC: 27 MMOL/L (ref 22–29)
DIASTOLIC BLOOD PRESSURE - MUSE: NORMAL MMHG
EGFRCR SERPLBLD CKD-EPI 2021: 34 ML/MIN/1.73M2
EGFRCR SERPLBLD CKD-EPI 2021: 35 ML/MIN/1.73M2
EGFRCR SERPLBLD CKD-EPI 2021: 38 ML/MIN/1.73M2
EGFRCR SERPLBLD CKD-EPI 2021: 41 ML/MIN/1.73M2
EGFRCR SERPLBLD CKD-EPI 2021: 42 ML/MIN/1.73M2
EGFRCR SERPLBLD CKD-EPI 2021: 42 ML/MIN/1.73M2
EGFRCR SERPLBLD CKD-EPI 2021: 43 ML/MIN/1.73M2
EGFRCR SERPLBLD CKD-EPI 2021: 48 ML/MIN/1.73M2
EGFRCR SERPLBLD CKD-EPI 2021: 51 ML/MIN/1.73M2
EGFRCR SERPLBLD CKD-EPI 2021: 52 ML/MIN/1.73M2
EGFRCR SERPLBLD CKD-EPI 2021: 53 ML/MIN/1.73M2
EOSINOPHIL # BLD AUTO: 0.1 10E3/UL (ref 0–0.7)
EOSINOPHIL # BLD AUTO: 0.3 10E3/UL (ref 0–0.7)
EOSINOPHIL # BLD AUTO: 0.4 10E3/UL (ref 0–0.7)
EOSINOPHIL # BLD AUTO: 0.4 10E3/UL (ref 0–0.7)
EOSINOPHIL NFR BLD AUTO: 2 %
EOSINOPHIL NFR BLD AUTO: 4 %
EOSINOPHIL NFR BLD AUTO: 4 %
EOSINOPHIL NFR BLD AUTO: 6 %
ERYTHROCYTE [DISTWIDTH] IN BLOOD BY AUTOMATED COUNT: 13.2 % (ref 10–15)
ERYTHROCYTE [DISTWIDTH] IN BLOOD BY AUTOMATED COUNT: 14.3 % (ref 10–15)
ERYTHROCYTE [DISTWIDTH] IN BLOOD BY AUTOMATED COUNT: 14.3 % (ref 10–15)
ERYTHROCYTE [DISTWIDTH] IN BLOOD BY AUTOMATED COUNT: 14.4 % (ref 10–15)
ERYTHROCYTE [DISTWIDTH] IN BLOOD BY AUTOMATED COUNT: 14.5 % (ref 10–15)
ERYTHROCYTE [DISTWIDTH] IN BLOOD BY AUTOMATED COUNT: 14.6 % (ref 10–15)
ERYTHROCYTE [DISTWIDTH] IN BLOOD BY AUTOMATED COUNT: 14.6 % (ref 10–15)
ERYTHROCYTE [DISTWIDTH] IN BLOOD BY AUTOMATED COUNT: 14.7 % (ref 10–15)
ERYTHROCYTE [DISTWIDTH] IN BLOOD BY AUTOMATED COUNT: 15.3 % (ref 10–15)
ERYTHROCYTE [SEDIMENTATION RATE] IN BLOOD BY WESTERGREN METHOD: 62 MM/HR (ref 0–20)
ERYTHROCYTE [SEDIMENTATION RATE] IN BLOOD BY WESTERGREN METHOD: 69 MM/HR (ref 0–20)
FERRITIN SERPL-MCNC: 130 NG/ML (ref 31–409)
FLUAV H1 2009 PAND RNA SPEC QL NAA+PROBE: NOT DETECTED
FLUAV H1 RNA SPEC QL NAA+PROBE: NOT DETECTED
FLUAV H3 RNA SPEC QL NAA+PROBE: NOT DETECTED
FLUAV RNA SPEC QL NAA+PROBE: NOT DETECTED
FLUBV RNA SPEC QL NAA+PROBE: NOT DETECTED
FOLATE SERPL-MCNC: 23.5 NG/ML (ref 4.6–34.8)
GAMMA GLOB SERPL ELPH-MCNC: 0.9 G/DL (ref 0.7–1.6)
GFR SERPL CREATININE-BSD FRML MDRD: 44 ML/MIN/1.73M2
GLUCOSE SERPL-MCNC: 101 MG/DL (ref 70–99)
GLUCOSE SERPL-MCNC: 132 MG/DL (ref 70–99)
GLUCOSE SERPL-MCNC: 165 MG/DL (ref 70–99)
GLUCOSE SERPL-MCNC: 79 MG/DL (ref 70–99)
GLUCOSE SERPL-MCNC: 81 MG/DL (ref 70–99)
GLUCOSE SERPL-MCNC: 84 MG/DL (ref 70–99)
GLUCOSE SERPL-MCNC: 87 MG/DL (ref 70–99)
GLUCOSE SERPL-MCNC: 88 MG/DL (ref 70–99)
GLUCOSE SERPL-MCNC: 89 MG/DL (ref 70–99)
GLUCOSE SERPL-MCNC: 89 MG/DL (ref 70–99)
GLUCOSE SERPL-MCNC: 98 MG/DL (ref 70–99)
GLUCOSE UR STRIP-MCNC: NEGATIVE MG/DL
HADV DNA SPEC QL NAA+PROBE: NOT DETECTED
HBA1C MFR BLD: 6 %
HCOV PNL SPEC NAA+PROBE: NOT DETECTED
HCT VFR BLD AUTO: 24.6 % (ref 40–53)
HCT VFR BLD AUTO: 24.7 % (ref 40–53)
HCT VFR BLD AUTO: 24.8 % (ref 40–53)
HCT VFR BLD AUTO: 26.1 % (ref 40–53)
HCT VFR BLD AUTO: 26.7 % (ref 40–53)
HCT VFR BLD AUTO: 26.9 % (ref 40–53)
HCT VFR BLD AUTO: 27.2 % (ref 40–53)
HCT VFR BLD AUTO: 27.6 % (ref 40–53)
HCT VFR BLD AUTO: 32.4 % (ref 40–53)
HGB BLD-MCNC: 10.2 G/DL (ref 13.3–17.7)
HGB BLD-MCNC: 7.7 G/DL (ref 13.3–17.7)
HGB BLD-MCNC: 7.8 G/DL (ref 13.3–17.7)
HGB BLD-MCNC: 7.9 G/DL (ref 13.3–17.7)
HGB BLD-MCNC: 8.3 G/DL (ref 13.3–17.7)
HGB BLD-MCNC: 8.4 G/DL (ref 13.3–17.7)
HGB BLD-MCNC: 8.4 G/DL (ref 13.3–17.7)
HGB BLD-MCNC: 8.8 G/DL (ref 13.3–17.7)
HGB BLD-MCNC: 8.8 G/DL (ref 13.3–17.7)
HGB UR QL STRIP: ABNORMAL
HMPV RNA SPEC QL NAA+PROBE: NOT DETECTED
HOLD SPECIMEN: NORMAL
HPIV1 RNA SPEC QL NAA+PROBE: NOT DETECTED
HPIV2 RNA SPEC QL NAA+PROBE: NOT DETECTED
HPIV3 RNA SPEC QL NAA+PROBE: NOT DETECTED
HPIV4 RNA SPEC QL NAA+PROBE: NOT DETECTED
IGM SERPL-MCNC: 134 MG/DL (ref 35–242)
IMM GRANULOCYTES # BLD: 0 10E3/UL
IMM GRANULOCYTES # BLD: 0.1 10E3/UL
IMM GRANULOCYTES NFR BLD: 0 %
IMM GRANULOCYTES NFR BLD: 1 %
INR (EXTERNAL): 1.3 (ref 0.9–1.1)
INR (EXTERNAL): 1.4 (ref 0.9–1.1)
INR (EXTERNAL): 1.5 (ref 0.9–1.1)
INR (EXTERNAL): 1.6 (ref 0.9–1.1)
INR (EXTERNAL): 1.9 (ref 0.9–1.1)
INR (EXTERNAL): 1.9 (ref 0.9–1.1)
INR BLD: 1.6 (ref 0.9–1.1)
INR PPP: 1.69 (ref 0.85–1.15)
INR PPP: 1.69 (ref 0.85–1.15)
INR PPP: 1.82 (ref 0.85–1.15)
INR PPP: 1.88 (ref 0.85–1.15)
INR PPP: 1.92 (ref 0.85–1.15)
INR PPP: 1.95 (ref 0.85–1.15)
INR PPP: 1.95 (ref 0.85–1.15)
INR PPP: 2.02 (ref 0.85–1.15)
INR PPP: 2.06 (ref 0.85–1.15)
INR PPP: 2.07 (ref 0.85–1.15)
INR PPP: 2.07 (ref 0.85–1.15)
INR PPP: 2.12 (ref 0.85–1.15)
INR PPP: 2.21 (ref 0.85–1.15)
INR PPP: 2.23 (ref 0.85–1.15)
INR PPP: 2.61 (ref 0.85–1.15)
INR PPP: 2.62 (ref 0.85–1.15)
INR PPP: 2.82 (ref 0.85–1.15)
INR PPP: 3.1 (ref 0.85–1.15)
INTERPRETATION ECG - MUSE: NORMAL
IRON BINDING CAPACITY (ROCHE): 218 UG/DL (ref 240–430)
IRON SATN MFR SERPL: 20 % (ref 15–46)
IRON SERPL-MCNC: 43 UG/DL (ref 61–157)
KETONES UR STRIP-MCNC: NEGATIVE MG/DL
LACTATE SERPL-SCNC: 1.7 MMOL/L (ref 0.7–2)
LDH SERPL L TO P-CCNC: 211 U/L (ref 0–250)
LEUKOCYTE ESTERASE UR QL STRIP: ABNORMAL
LYMPHOCYTES # BLD AUTO: 0.4 10E3/UL (ref 0.8–5.3)
LYMPHOCYTES # BLD AUTO: 0.6 10E3/UL (ref 0.8–5.3)
LYMPHOCYTES # BLD AUTO: 0.7 10E3/UL (ref 0.8–5.3)
LYMPHOCYTES # BLD AUTO: 0.7 10E3/UL (ref 0.8–5.3)
LYMPHOCYTES NFR BLD AUTO: 5 %
LYMPHOCYTES NFR BLD AUTO: 8 %
LYMPHOCYTES NFR BLD AUTO: 9 %
LYMPHOCYTES NFR BLD AUTO: 9 %
M PNEUMO DNA SPEC QL NAA+PROBE: NOT DETECTED
M PROTEIN SERPL ELPH-MCNC: 0.1 G/DL
MAGNESIUM SERPL-MCNC: 2 MG/DL (ref 1.7–2.3)
MCH RBC QN AUTO: 30.1 PG (ref 26.5–33)
MCH RBC QN AUTO: 30.1 PG (ref 26.5–33)
MCH RBC QN AUTO: 30.4 PG (ref 26.5–33)
MCH RBC QN AUTO: 30.6 PG (ref 26.5–33)
MCH RBC QN AUTO: 30.9 PG (ref 26.5–33)
MCH RBC QN AUTO: 31 PG (ref 26.5–33)
MCH RBC QN AUTO: 31.2 PG (ref 26.5–33)
MCHC RBC AUTO-ENTMCNC: 30.9 G/DL (ref 31.5–36.5)
MCHC RBC AUTO-ENTMCNC: 31.1 G/DL (ref 31.5–36.5)
MCHC RBC AUTO-ENTMCNC: 31.2 G/DL (ref 31.5–36.5)
MCHC RBC AUTO-ENTMCNC: 31.5 G/DL (ref 31.5–36.5)
MCHC RBC AUTO-ENTMCNC: 31.5 G/DL (ref 31.5–36.5)
MCHC RBC AUTO-ENTMCNC: 31.9 G/DL (ref 31.5–36.5)
MCHC RBC AUTO-ENTMCNC: 32.1 G/DL (ref 31.5–36.5)
MCHC RBC AUTO-ENTMCNC: 32.2 G/DL (ref 31.5–36.5)
MCHC RBC AUTO-ENTMCNC: 32.7 G/DL (ref 31.5–36.5)
MCV RBC AUTO: 95 FL (ref 78–100)
MCV RBC AUTO: 95 FL (ref 78–100)
MCV RBC AUTO: 96 FL (ref 78–100)
MCV RBC AUTO: 96 FL (ref 78–100)
MCV RBC AUTO: 97 FL (ref 78–100)
MCV RBC AUTO: 98 FL (ref 78–100)
MCV RBC AUTO: 99 FL (ref 78–100)
MDC_IDC_LEAD_IMPLANT_DT: NORMAL
MDC_IDC_LEAD_LOCATION: NORMAL
MDC_IDC_LEAD_LOCATION_DETAIL_1: NORMAL
MDC_IDC_LEAD_MFG: NORMAL
MDC_IDC_LEAD_MODEL: NORMAL
MDC_IDC_LEAD_POLARITY_TYPE: NORMAL
MDC_IDC_LEAD_SERIAL: NORMAL
MDC_IDC_MSMT_BATTERY_DTM: NORMAL
MDC_IDC_MSMT_BATTERY_DTM: NORMAL
MDC_IDC_MSMT_BATTERY_REMAINING_LONGEVITY: 43 MO
MDC_IDC_MSMT_BATTERY_REMAINING_LONGEVITY: 48 MO
MDC_IDC_MSMT_BATTERY_RRT_TRIGGER: 2.73
MDC_IDC_MSMT_BATTERY_RRT_TRIGGER: 2.73
MDC_IDC_MSMT_BATTERY_STATUS: NORMAL
MDC_IDC_MSMT_BATTERY_STATUS: NORMAL
MDC_IDC_MSMT_BATTERY_VOLTAGE: 2.96 V
MDC_IDC_MSMT_BATTERY_VOLTAGE: 2.97 V
MDC_IDC_MSMT_CAP_CHARGE_DTM: NORMAL
MDC_IDC_MSMT_CAP_CHARGE_DTM: NORMAL
MDC_IDC_MSMT_CAP_CHARGE_ENERGY: 18 J
MDC_IDC_MSMT_CAP_CHARGE_ENERGY: 18 J
MDC_IDC_MSMT_CAP_CHARGE_TIME: 3.89
MDC_IDC_MSMT_CAP_CHARGE_TIME: 3.94
MDC_IDC_MSMT_CAP_CHARGE_TYPE: NORMAL
MDC_IDC_MSMT_CAP_CHARGE_TYPE: NORMAL
MDC_IDC_MSMT_LEADCHNL_RA_IMPEDANCE_VALUE: 456 OHM
MDC_IDC_MSMT_LEADCHNL_RA_IMPEDANCE_VALUE: 475 OHM
MDC_IDC_MSMT_LEADCHNL_RA_PACING_THRESHOLD_AMPLITUDE: 0.75 V
MDC_IDC_MSMT_LEADCHNL_RA_PACING_THRESHOLD_AMPLITUDE: 0.75 V
MDC_IDC_MSMT_LEADCHNL_RA_PACING_THRESHOLD_PULSEWIDTH: 0.4 MS
MDC_IDC_MSMT_LEADCHNL_RA_PACING_THRESHOLD_PULSEWIDTH: 0.4 MS
MDC_IDC_MSMT_LEADCHNL_RA_SENSING_INTR_AMPL: 0.4 MV
MDC_IDC_MSMT_LEADCHNL_RA_SENSING_INTR_AMPL: 0.5 MV
MDC_IDC_MSMT_LEADCHNL_RV_IMPEDANCE_VALUE: 285 OHM
MDC_IDC_MSMT_LEADCHNL_RV_IMPEDANCE_VALUE: 304 OHM
MDC_IDC_MSMT_LEADCHNL_RV_IMPEDANCE_VALUE: 342 OHM
MDC_IDC_MSMT_LEADCHNL_RV_IMPEDANCE_VALUE: 342 OHM
MDC_IDC_MSMT_LEADCHNL_RV_PACING_THRESHOLD_AMPLITUDE: 0.75 V
MDC_IDC_MSMT_LEADCHNL_RV_PACING_THRESHOLD_AMPLITUDE: 0.75 V
MDC_IDC_MSMT_LEADCHNL_RV_PACING_THRESHOLD_PULSEWIDTH: 0.4 MS
MDC_IDC_MSMT_LEADCHNL_RV_PACING_THRESHOLD_PULSEWIDTH: 0.4 MS
MDC_IDC_MSMT_LEADCHNL_RV_SENSING_INTR_AMPL: 7 MV
MDC_IDC_MSMT_LEADCHNL_RV_SENSING_INTR_AMPL: 7.8 MV
MDC_IDC_PG_IMPLANT_DTM: NORMAL
MDC_IDC_PG_IMPLANT_DTM: NORMAL
MDC_IDC_PG_MFG: NORMAL
MDC_IDC_PG_MFG: NORMAL
MDC_IDC_PG_MODEL: NORMAL
MDC_IDC_PG_MODEL: NORMAL
MDC_IDC_PG_SERIAL: NORMAL
MDC_IDC_PG_SERIAL: NORMAL
MDC_IDC_PG_TYPE: NORMAL
MDC_IDC_PG_TYPE: NORMAL
MDC_IDC_SESS_CLINIC_NAME: NORMAL
MDC_IDC_SESS_CLINIC_NAME: NORMAL
MDC_IDC_SESS_DTM: NORMAL
MDC_IDC_SESS_DTM: NORMAL
MDC_IDC_SESS_TYPE: NORMAL
MDC_IDC_SESS_TYPE: NORMAL
MDC_IDC_SET_BRADY_AT_MODE_SWITCH_RATE: 171 {BEATS}/MIN
MDC_IDC_SET_BRADY_AT_MODE_SWITCH_RATE: 171 {BEATS}/MIN
MDC_IDC_SET_BRADY_HYSTRATE: NORMAL
MDC_IDC_SET_BRADY_HYSTRATE: NORMAL
MDC_IDC_SET_BRADY_LOWRATE: 60 {BEATS}/MIN
MDC_IDC_SET_BRADY_LOWRATE: 60 {BEATS}/MIN
MDC_IDC_SET_BRADY_MAX_SENSOR_RATE: 130 {BEATS}/MIN
MDC_IDC_SET_BRADY_MAX_SENSOR_RATE: 130 {BEATS}/MIN
MDC_IDC_SET_BRADY_MAX_TRACKING_RATE: 130 {BEATS}/MIN
MDC_IDC_SET_BRADY_MAX_TRACKING_RATE: 130 {BEATS}/MIN
MDC_IDC_SET_BRADY_MODE: NORMAL
MDC_IDC_SET_BRADY_MODE: NORMAL
MDC_IDC_SET_BRADY_PAV_DELAY_LOW: 180 MS
MDC_IDC_SET_BRADY_PAV_DELAY_LOW: 180 MS
MDC_IDC_SET_BRADY_SAV_DELAY_LOW: 150 MS
MDC_IDC_SET_BRADY_SAV_DELAY_LOW: 150 MS
MDC_IDC_SET_LEADCHNL_RA_PACING_AMPLITUDE: 1.5 V
MDC_IDC_SET_LEADCHNL_RA_PACING_AMPLITUDE: 1.5 V
MDC_IDC_SET_LEADCHNL_RA_PACING_ANODE_ELECTRODE_1: NORMAL
MDC_IDC_SET_LEADCHNL_RA_PACING_ANODE_ELECTRODE_1: NORMAL
MDC_IDC_SET_LEADCHNL_RA_PACING_ANODE_LOCATION_1: NORMAL
MDC_IDC_SET_LEADCHNL_RA_PACING_ANODE_LOCATION_1: NORMAL
MDC_IDC_SET_LEADCHNL_RA_PACING_CAPTURE_MODE: NORMAL
MDC_IDC_SET_LEADCHNL_RA_PACING_CAPTURE_MODE: NORMAL
MDC_IDC_SET_LEADCHNL_RA_PACING_CATHODE_ELECTRODE_1: NORMAL
MDC_IDC_SET_LEADCHNL_RA_PACING_CATHODE_ELECTRODE_1: NORMAL
MDC_IDC_SET_LEADCHNL_RA_PACING_CATHODE_LOCATION_1: NORMAL
MDC_IDC_SET_LEADCHNL_RA_PACING_CATHODE_LOCATION_1: NORMAL
MDC_IDC_SET_LEADCHNL_RA_PACING_POLARITY: NORMAL
MDC_IDC_SET_LEADCHNL_RA_PACING_POLARITY: NORMAL
MDC_IDC_SET_LEADCHNL_RA_PACING_PULSEWIDTH: 0.4 MS
MDC_IDC_SET_LEADCHNL_RA_PACING_PULSEWIDTH: 0.4 MS
MDC_IDC_SET_LEADCHNL_RA_SENSING_ANODE_ELECTRODE_1: NORMAL
MDC_IDC_SET_LEADCHNL_RA_SENSING_ANODE_ELECTRODE_1: NORMAL
MDC_IDC_SET_LEADCHNL_RA_SENSING_ANODE_LOCATION_1: NORMAL
MDC_IDC_SET_LEADCHNL_RA_SENSING_ANODE_LOCATION_1: NORMAL
MDC_IDC_SET_LEADCHNL_RA_SENSING_CATHODE_ELECTRODE_1: NORMAL
MDC_IDC_SET_LEADCHNL_RA_SENSING_CATHODE_ELECTRODE_1: NORMAL
MDC_IDC_SET_LEADCHNL_RA_SENSING_CATHODE_LOCATION_1: NORMAL
MDC_IDC_SET_LEADCHNL_RA_SENSING_CATHODE_LOCATION_1: NORMAL
MDC_IDC_SET_LEADCHNL_RA_SENSING_POLARITY: NORMAL
MDC_IDC_SET_LEADCHNL_RA_SENSING_POLARITY: NORMAL
MDC_IDC_SET_LEADCHNL_RA_SENSING_SENSITIVITY: 0.3 MV
MDC_IDC_SET_LEADCHNL_RA_SENSING_SENSITIVITY: 0.3 MV
MDC_IDC_SET_LEADCHNL_RV_PACING_AMPLITUDE: 2 V
MDC_IDC_SET_LEADCHNL_RV_PACING_AMPLITUDE: 2 V
MDC_IDC_SET_LEADCHNL_RV_PACING_ANODE_ELECTRODE_1: NORMAL
MDC_IDC_SET_LEADCHNL_RV_PACING_ANODE_ELECTRODE_1: NORMAL
MDC_IDC_SET_LEADCHNL_RV_PACING_ANODE_LOCATION_1: NORMAL
MDC_IDC_SET_LEADCHNL_RV_PACING_ANODE_LOCATION_1: NORMAL
MDC_IDC_SET_LEADCHNL_RV_PACING_CAPTURE_MODE: NORMAL
MDC_IDC_SET_LEADCHNL_RV_PACING_CAPTURE_MODE: NORMAL
MDC_IDC_SET_LEADCHNL_RV_PACING_CATHODE_ELECTRODE_1: NORMAL
MDC_IDC_SET_LEADCHNL_RV_PACING_CATHODE_ELECTRODE_1: NORMAL
MDC_IDC_SET_LEADCHNL_RV_PACING_CATHODE_LOCATION_1: NORMAL
MDC_IDC_SET_LEADCHNL_RV_PACING_CATHODE_LOCATION_1: NORMAL
MDC_IDC_SET_LEADCHNL_RV_PACING_POLARITY: NORMAL
MDC_IDC_SET_LEADCHNL_RV_PACING_POLARITY: NORMAL
MDC_IDC_SET_LEADCHNL_RV_PACING_PULSEWIDTH: 0.4 MS
MDC_IDC_SET_LEADCHNL_RV_PACING_PULSEWIDTH: 0.4 MS
MDC_IDC_SET_LEADCHNL_RV_SENSING_ANODE_ELECTRODE_1: NORMAL
MDC_IDC_SET_LEADCHNL_RV_SENSING_ANODE_ELECTRODE_1: NORMAL
MDC_IDC_SET_LEADCHNL_RV_SENSING_ANODE_LOCATION_1: NORMAL
MDC_IDC_SET_LEADCHNL_RV_SENSING_ANODE_LOCATION_1: NORMAL
MDC_IDC_SET_LEADCHNL_RV_SENSING_CATHODE_ELECTRODE_1: NORMAL
MDC_IDC_SET_LEADCHNL_RV_SENSING_CATHODE_ELECTRODE_1: NORMAL
MDC_IDC_SET_LEADCHNL_RV_SENSING_CATHODE_LOCATION_1: NORMAL
MDC_IDC_SET_LEADCHNL_RV_SENSING_CATHODE_LOCATION_1: NORMAL
MDC_IDC_SET_LEADCHNL_RV_SENSING_POLARITY: NORMAL
MDC_IDC_SET_LEADCHNL_RV_SENSING_POLARITY: NORMAL
MDC_IDC_SET_LEADCHNL_RV_SENSING_SENSITIVITY: 0.45 MV
MDC_IDC_SET_LEADCHNL_RV_SENSING_SENSITIVITY: 0.45 MV
MDC_IDC_SET_ZONE_DETECTION_BEATS_DENOMINATOR: 24 {BEATS}
MDC_IDC_SET_ZONE_DETECTION_BEATS_DENOMINATOR: 24 {BEATS}
MDC_IDC_SET_ZONE_DETECTION_BEATS_NUMERATOR: 18 {BEATS}
MDC_IDC_SET_ZONE_DETECTION_BEATS_NUMERATOR: 18 {BEATS}
MDC_IDC_SET_ZONE_DETECTION_INTERVAL: 300 MS
MDC_IDC_SET_ZONE_DETECTION_INTERVAL: 300 MS
MDC_IDC_SET_ZONE_DETECTION_INTERVAL: 320 MS
MDC_IDC_SET_ZONE_DETECTION_INTERVAL: 320 MS
MDC_IDC_SET_ZONE_DETECTION_INTERVAL: 350 MS
MDC_IDC_SET_ZONE_DETECTION_INTERVAL: 350 MS
MDC_IDC_SET_ZONE_DETECTION_INTERVAL: 430 MS
MDC_IDC_SET_ZONE_DETECTION_INTERVAL: 430 MS
MDC_IDC_SET_ZONE_DETECTION_INTERVAL: NORMAL
MDC_IDC_SET_ZONE_DETECTION_INTERVAL: NORMAL
MDC_IDC_SET_ZONE_TYPE: NORMAL
MDC_IDC_STAT_AT_BURDEN_PERCENT: 0 %
MDC_IDC_STAT_AT_BURDEN_PERCENT: 0.1 %
MDC_IDC_STAT_AT_DTM_END: NORMAL
MDC_IDC_STAT_AT_DTM_END: NORMAL
MDC_IDC_STAT_AT_DTM_START: NORMAL
MDC_IDC_STAT_AT_DTM_START: NORMAL
MDC_IDC_STAT_BRADY_AP_VP_PERCENT: 34.96 %
MDC_IDC_STAT_BRADY_AP_VP_PERCENT: 47.21 %
MDC_IDC_STAT_BRADY_AP_VS_PERCENT: 29.87 %
MDC_IDC_STAT_BRADY_AP_VS_PERCENT: 34.81 %
MDC_IDC_STAT_BRADY_AS_VP_PERCENT: 10.34 %
MDC_IDC_STAT_BRADY_AS_VP_PERCENT: 13.11 %
MDC_IDC_STAT_BRADY_AS_VS_PERCENT: 19.89 %
MDC_IDC_STAT_BRADY_AS_VS_PERCENT: 9.81 %
MDC_IDC_STAT_BRADY_DTM_END: NORMAL
MDC_IDC_STAT_BRADY_DTM_END: NORMAL
MDC_IDC_STAT_BRADY_DTM_START: NORMAL
MDC_IDC_STAT_BRADY_DTM_START: NORMAL
MDC_IDC_STAT_BRADY_RA_PERCENT_PACED: 68.21 %
MDC_IDC_STAT_BRADY_RA_PERCENT_PACED: 76.99 %
MDC_IDC_STAT_BRADY_RV_PERCENT_PACED: 45.34 %
MDC_IDC_STAT_BRADY_RV_PERCENT_PACED: 59.7 %
MDC_IDC_STAT_EPISODE_RECENT_COUNT: 0
MDC_IDC_STAT_EPISODE_RECENT_COUNT_DTM_END: NORMAL
MDC_IDC_STAT_EPISODE_RECENT_COUNT_DTM_START: NORMAL
MDC_IDC_STAT_EPISODE_TOTAL_COUNT: 0
MDC_IDC_STAT_EPISODE_TOTAL_COUNT: 1
MDC_IDC_STAT_EPISODE_TOTAL_COUNT: 1
MDC_IDC_STAT_EPISODE_TOTAL_COUNT: 6
MDC_IDC_STAT_EPISODE_TOTAL_COUNT: 6
MDC_IDC_STAT_EPISODE_TOTAL_COUNT: 77
MDC_IDC_STAT_EPISODE_TOTAL_COUNT: 77
MDC_IDC_STAT_EPISODE_TOTAL_COUNT_DTM_END: NORMAL
MDC_IDC_STAT_EPISODE_TOTAL_COUNT_DTM_START: NORMAL
MDC_IDC_STAT_EPISODE_TYPE: NORMAL
MDC_IDC_STAT_TACHYTHERAPY_ATP_DELIVERED_RECENT: 0
MDC_IDC_STAT_TACHYTHERAPY_ATP_DELIVERED_RECENT: 0
MDC_IDC_STAT_TACHYTHERAPY_ATP_DELIVERED_TOTAL: 0
MDC_IDC_STAT_TACHYTHERAPY_ATP_DELIVERED_TOTAL: 0
MDC_IDC_STAT_TACHYTHERAPY_RECENT_DTM_END: NORMAL
MDC_IDC_STAT_TACHYTHERAPY_RECENT_DTM_END: NORMAL
MDC_IDC_STAT_TACHYTHERAPY_RECENT_DTM_START: NORMAL
MDC_IDC_STAT_TACHYTHERAPY_RECENT_DTM_START: NORMAL
MDC_IDC_STAT_TACHYTHERAPY_SHOCKS_ABORTED_RECENT: 0
MDC_IDC_STAT_TACHYTHERAPY_SHOCKS_ABORTED_RECENT: 0
MDC_IDC_STAT_TACHYTHERAPY_SHOCKS_ABORTED_TOTAL: 0
MDC_IDC_STAT_TACHYTHERAPY_SHOCKS_ABORTED_TOTAL: 0
MDC_IDC_STAT_TACHYTHERAPY_SHOCKS_DELIVERED_RECENT: 0
MDC_IDC_STAT_TACHYTHERAPY_SHOCKS_DELIVERED_RECENT: 0
MDC_IDC_STAT_TACHYTHERAPY_SHOCKS_DELIVERED_TOTAL: 0
MDC_IDC_STAT_TACHYTHERAPY_SHOCKS_DELIVERED_TOTAL: 0
MDC_IDC_STAT_TACHYTHERAPY_TOTAL_DTM_END: NORMAL
MDC_IDC_STAT_TACHYTHERAPY_TOTAL_DTM_END: NORMAL
MDC_IDC_STAT_TACHYTHERAPY_TOTAL_DTM_START: NORMAL
MDC_IDC_STAT_TACHYTHERAPY_TOTAL_DTM_START: NORMAL
METHYLMALONATE SERPL-SCNC: 0.34 UMOL/L (ref 0–0.4)
MONOCYTES # BLD AUTO: 0.6 10E3/UL (ref 0–1.3)
MONOCYTES # BLD AUTO: 0.7 10E3/UL (ref 0–1.3)
MONOCYTES # BLD AUTO: 0.8 10E3/UL (ref 0–1.3)
MONOCYTES # BLD AUTO: 0.9 10E3/UL (ref 0–1.3)
MONOCYTES NFR BLD AUTO: 12 %
MONOCYTES NFR BLD AUTO: 8 %
MONOCYTES NFR BLD AUTO: 9 %
MONOCYTES NFR BLD AUTO: 9 %
MRSA DNA SPEC QL NAA+PROBE: NEGATIVE
NEUTROPHILS # BLD AUTO: 5.1 10E3/UL (ref 1.6–8.3)
NEUTROPHILS # BLD AUTO: 6.1 10E3/UL (ref 1.6–8.3)
NEUTROPHILS # BLD AUTO: 6.3 10E3/UL (ref 1.6–8.3)
NEUTROPHILS # BLD AUTO: 6.6 10E3/UL (ref 1.6–8.3)
NEUTROPHILS NFR BLD AUTO: 77 %
NEUTROPHILS NFR BLD AUTO: 81 %
NITRATE UR QL: NEGATIVE
NRBC # BLD AUTO: 0 10E3/UL
NRBC BLD AUTO-RTO: 0 /100
P AXIS - MUSE: NORMAL DEGREES
PH UR STRIP: 6 [PH] (ref 5–7)
PLATELET # BLD AUTO: 134 10E3/UL (ref 150–450)
PLATELET # BLD AUTO: 145 10E3/UL (ref 150–450)
PLATELET # BLD AUTO: 148 10E3/UL (ref 150–450)
PLATELET # BLD AUTO: 151 10E3/UL (ref 150–450)
PLATELET # BLD AUTO: 159 10E3/UL (ref 150–450)
PLATELET # BLD AUTO: 170 10E3/UL (ref 150–450)
PLATELET # BLD AUTO: 191 10E3/UL (ref 150–450)
POTASSIUM SERPL-SCNC: 3.3 MMOL/L (ref 3.4–5.3)
POTASSIUM SERPL-SCNC: 3.6 MMOL/L (ref 3.4–5.3)
POTASSIUM SERPL-SCNC: 3.7 MMOL/L (ref 3.4–5.3)
POTASSIUM SERPL-SCNC: 4.1 MMOL/L (ref 3.4–5.3)
POTASSIUM SERPL-SCNC: 4.2 MMOL/L (ref 3.4–5.3)
POTASSIUM SERPL-SCNC: 4.3 MMOL/L (ref 3.4–5.3)
POTASSIUM SERPL-SCNC: 4.4 MMOL/L (ref 3.4–5.3)
POTASSIUM SERPL-SCNC: 4.4 MMOL/L (ref 3.4–5.3)
POTASSIUM SERPL-SCNC: 4.5 MMOL/L (ref 3.4–5.3)
POTASSIUM SERPL-SCNC: 4.8 MMOL/L (ref 3.4–5.3)
POTASSIUM SERPL-SCNC: 4.9 MMOL/L (ref 3.4–5.3)
POTASSIUM SERPL-SCNC: 5.2 MMOL/L (ref 3.4–5.3)
PR INTERVAL - MUSE: NORMAL MS
PROT PATTERN SERPL ELPH-IMP: ABNORMAL
PROT PATTERN SERPL IFE-IMP: NORMAL
PROT SERPL-MCNC: 6.4 G/DL (ref 6.4–8.3)
PROT SERPL-MCNC: 6.5 G/DL (ref 6.4–8.3)
PROT SERPL-MCNC: 7 G/DL (ref 6.4–8.3)
PROT SERPL-MCNC: 7.1 G/DL (ref 6.4–8.3)
QRS DURATION - MUSE: 126 MS
QT - MUSE: 424 MS
QTC - MUSE: 640 MS
R AXIS - MUSE: -37 DEGREES
RBC # BLD AUTO: 2.55 10E6/UL (ref 4.4–5.9)
RBC # BLD AUTO: 2.56 10E6/UL (ref 4.4–5.9)
RBC # BLD AUTO: 2.58 10E6/UL (ref 4.4–5.9)
RBC # BLD AUTO: 2.71 10E6/UL (ref 4.4–5.9)
RBC # BLD AUTO: 2.72 10E6/UL (ref 4.4–5.9)
RBC # BLD AUTO: 2.79 10E6/UL (ref 4.4–5.9)
RBC # BLD AUTO: 2.82 10E6/UL (ref 4.4–5.9)
RBC # BLD AUTO: 2.84 10E6/UL (ref 4.4–5.9)
RBC # BLD AUTO: 3.36 10E6/UL (ref 4.4–5.9)
RBC URINE: 30 /HPF
RSV RNA SPEC QL NAA+PROBE: NOT DETECTED
RSV RNA SPEC QL NAA+PROBE: NOT DETECTED
RV+EV RNA SPEC QL NAA+PROBE: NOT DETECTED
SA TARGET DNA: NEGATIVE
SARS-COV-2 RNA RESP QL NAA+PROBE: POSITIVE
SODIUM SERPL-SCNC: 139 MMOL/L (ref 135–145)
SODIUM SERPL-SCNC: 140 MMOL/L (ref 135–145)
SODIUM SERPL-SCNC: 141 MMOL/L (ref 135–145)
SODIUM SERPL-SCNC: 141 MMOL/L (ref 135–145)
SODIUM SERPL-SCNC: 142 MMOL/L (ref 135–145)
SODIUM SERPL-SCNC: 143 MMOL/L (ref 135–145)
SODIUM SERPL-SCNC: 143 MMOL/L (ref 135–145)
SODIUM SERPL-SCNC: 143 MMOL/L (ref 136–145)
SP GR UR STRIP: 1.02 (ref 1–1.03)
SYSTOLIC BLOOD PRESSURE - MUSE: NORMAL MMHG
T AXIS - MUSE: -56 DEGREES
TOTAL PROTEIN SERUM FOR ELP: 6.7 G/DL (ref 6.4–8.3)
TROPONIN T SERPL HS-MCNC: 68 NG/L
TSH SERPL DL<=0.005 MIU/L-ACNC: 1.2 UIU/ML (ref 0.3–4.2)
UROBILINOGEN UR STRIP-MCNC: NORMAL MG/DL
VANCOMYCIN SERPL-MCNC: 12.3 UG/ML
VANCOMYCIN SERPL-MCNC: 12.9 UG/ML
VENTRICULAR RATE- MUSE: 137 BPM
VIT B12 SERPL-MCNC: 3816 PG/ML (ref 232–1245)
WBC # BLD AUTO: 4 10E3/UL (ref 4–11)
WBC # BLD AUTO: 4.7 10E3/UL (ref 4–11)
WBC # BLD AUTO: 4.9 10E3/UL (ref 4–11)
WBC # BLD AUTO: 6.7 10E3/UL (ref 4–11)
WBC # BLD AUTO: 7 10E3/UL (ref 4–11)
WBC # BLD AUTO: 7.9 10E3/UL (ref 4–11)
WBC # BLD AUTO: 8.1 10E3/UL (ref 4–11)
WBC # BLD AUTO: 8.1 10E3/UL (ref 4–11)
WBC # BLD AUTO: 8.5 10E3/UL (ref 4–11)
WBC # BLD AUTO: ABNORMAL 10*3/UL
WBC CLUMPS #/AREA URNS HPF: PRESENT /HPF
WBC URINE: >182 /HPF

## 2023-01-01 PROCEDURE — 258N000003 HC RX IP 258 OP 636: Performed by: STUDENT IN AN ORGANIZED HEALTH CARE EDUCATION/TRAINING PROGRAM

## 2023-01-01 PROCEDURE — 250N000013 HC RX MED GY IP 250 OP 250 PS 637

## 2023-01-01 PROCEDURE — 71045 X-RAY EXAM CHEST 1 VIEW: CPT

## 2023-01-01 PROCEDURE — 250N000013 HC RX MED GY IP 250 OP 250 PS 637: Performed by: STUDENT IN AN ORGANIZED HEALTH CARE EDUCATION/TRAINING PROGRAM

## 2023-01-01 PROCEDURE — 74018 RADEX ABDOMEN 1 VIEW: CPT | Mod: 26 | Performed by: RADIOLOGY

## 2023-01-01 PROCEDURE — 93283 PRGRMG EVAL IMPLANTABLE DFB: CPT | Performed by: INTERNAL MEDICINE

## 2023-01-01 PROCEDURE — 71045 X-RAY EXAM CHEST 1 VIEW: CPT | Mod: 26 | Performed by: RADIOLOGY

## 2023-01-01 PROCEDURE — 85027 COMPLETE CBC AUTOMATED: CPT | Performed by: INTERNAL MEDICINE

## 2023-01-01 PROCEDURE — 87040 BLOOD CULTURE FOR BACTERIA: CPT | Performed by: EMERGENCY MEDICINE

## 2023-01-01 PROCEDURE — 97530 THERAPEUTIC ACTIVITIES: CPT | Mod: GP

## 2023-01-01 PROCEDURE — 250N000011 HC RX IP 250 OP 636: Performed by: INTERNAL MEDICINE

## 2023-01-01 PROCEDURE — G0463 HOSPITAL OUTPT CLINIC VISIT: HCPCS | Performed by: OPHTHALMOLOGY

## 2023-01-01 PROCEDURE — 85610 PROTHROMBIN TIME: CPT

## 2023-01-01 PROCEDURE — 99232 SBSQ HOSP IP/OBS MODERATE 35: CPT | Performed by: INTERNAL MEDICINE

## 2023-01-01 PROCEDURE — 36415 COLL VENOUS BLD VENIPUNCTURE: CPT

## 2023-01-01 PROCEDURE — 36415 COLL VENOUS BLD VENIPUNCTURE: CPT | Performed by: PATHOLOGY

## 2023-01-01 PROCEDURE — 258N000003 HC RX IP 258 OP 636: Performed by: INTERNAL MEDICINE

## 2023-01-01 PROCEDURE — 250N000013 HC RX MED GY IP 250 OP 250 PS 637: Performed by: INTERNAL MEDICINE

## 2023-01-01 PROCEDURE — 250N000011 HC RX IP 250 OP 636: Mod: JZ | Performed by: INTERNAL MEDICINE

## 2023-01-01 PROCEDURE — 84132 ASSAY OF SERUM POTASSIUM: CPT | Performed by: INTERNAL MEDICINE

## 2023-01-01 PROCEDURE — 80053 COMPREHEN METABOLIC PANEL: CPT | Performed by: INTERNAL MEDICINE

## 2023-01-01 PROCEDURE — 87640 STAPH A DNA AMP PROBE: CPT

## 2023-01-01 PROCEDURE — 85610 PROTHROMBIN TIME: CPT | Performed by: PATHOLOGY

## 2023-01-01 PROCEDURE — 86140 C-REACTIVE PROTEIN: CPT | Performed by: INTERNAL MEDICINE

## 2023-01-01 PROCEDURE — 36415 COLL VENOUS BLD VENIPUNCTURE: CPT | Performed by: INTERNAL MEDICINE

## 2023-01-01 PROCEDURE — 83036 HEMOGLOBIN GLYCOSYLATED A1C: CPT

## 2023-01-01 PROCEDURE — 84484 ASSAY OF TROPONIN QUANT: CPT | Performed by: INTERNAL MEDICINE

## 2023-01-01 PROCEDURE — G0463 HOSPITAL OUTPT CLINIC VISIT: HCPCS | Performed by: INTERNAL MEDICINE

## 2023-01-01 PROCEDURE — 120N000002 HC R&B MED SURG/OB UMMC

## 2023-01-01 PROCEDURE — 85379 FIBRIN DEGRADATION QUANT: CPT | Performed by: INTERNAL MEDICINE

## 2023-01-01 PROCEDURE — 87040 BLOOD CULTURE FOR BACTERIA: CPT | Performed by: INTERNAL MEDICINE

## 2023-01-01 PROCEDURE — 87581 M.PNEUMON DNA AMP PROBE: CPT | Performed by: INTERNAL MEDICINE

## 2023-01-01 PROCEDURE — 250N000011 HC RX IP 250 OP 636: Performed by: STUDENT IN AN ORGANIZED HEALTH CARE EDUCATION/TRAINING PROGRAM

## 2023-01-01 PROCEDURE — 82746 ASSAY OF FOLIC ACID SERUM: CPT

## 2023-01-01 PROCEDURE — 20610 DRAIN/INJ JOINT/BURSA W/O US: CPT | Mod: 50 | Performed by: PREVENTIVE MEDICINE

## 2023-01-01 PROCEDURE — 99207 PR DROP WITH A PROCEDURE: CPT | Performed by: PREVENTIVE MEDICINE

## 2023-01-01 PROCEDURE — 99285 EMERGENCY DEPT VISIT HI MDM: CPT | Mod: 25 | Performed by: EMERGENCY MEDICINE

## 2023-01-01 PROCEDURE — 258N000003 HC RX IP 258 OP 636

## 2023-01-01 PROCEDURE — 80048 BASIC METABOLIC PNL TOTAL CA: CPT | Performed by: INTERNAL MEDICINE

## 2023-01-01 PROCEDURE — 99203 OFFICE O/P NEW LOW 30 MIN: CPT | Performed by: HOSPITALIST

## 2023-01-01 PROCEDURE — 83605 ASSAY OF LACTIC ACID: CPT | Performed by: EMERGENCY MEDICINE

## 2023-01-01 PROCEDURE — 80202 ASSAY OF VANCOMYCIN: CPT | Performed by: INTERNAL MEDICINE

## 2023-01-01 PROCEDURE — 99000 SPECIMEN HANDLING OFFICE-LAB: CPT | Performed by: PATHOLOGY

## 2023-01-01 PROCEDURE — 93978 VASCULAR STUDY: CPT | Mod: GC | Performed by: RADIOLOGY

## 2023-01-01 PROCEDURE — 81001 URINALYSIS AUTO W/SCOPE: CPT | Performed by: PATHOLOGY

## 2023-01-01 PROCEDURE — 93010 ELECTROCARDIOGRAM REPORT: CPT | Performed by: INTERNAL MEDICINE

## 2023-01-01 PROCEDURE — 85027 COMPLETE CBC AUTOMATED: CPT

## 2023-01-01 PROCEDURE — 85610 PROTHROMBIN TIME: CPT | Performed by: EMERGENCY MEDICINE

## 2023-01-01 PROCEDURE — 80053 COMPREHEN METABOLIC PANEL: CPT | Performed by: PATHOLOGY

## 2023-01-01 PROCEDURE — 83735 ASSAY OF MAGNESIUM: CPT | Performed by: EMERGENCY MEDICINE

## 2023-01-01 PROCEDURE — 85730 THROMBOPLASTIN TIME PARTIAL: CPT | Performed by: EMERGENCY MEDICINE

## 2023-01-01 PROCEDURE — 82378 CARCINOEMBRYONIC ANTIGEN: CPT | Performed by: INTERNAL MEDICINE

## 2023-01-01 PROCEDURE — G0463 HOSPITAL OUTPT CLINIC VISIT: HCPCS

## 2023-01-01 PROCEDURE — 92014 COMPRE OPH EXAM EST PT 1/>: CPT | Performed by: OPHTHALMOLOGY

## 2023-01-01 PROCEDURE — 99215 OFFICE O/P EST HI 40 MIN: CPT | Performed by: SURGERY

## 2023-01-01 PROCEDURE — 82607 VITAMIN B-12: CPT

## 2023-01-01 PROCEDURE — 99214 OFFICE O/P EST MOD 30 MIN: CPT | Performed by: INTERNAL MEDICINE

## 2023-01-01 PROCEDURE — 86140 C-REACTIVE PROTEIN: CPT | Performed by: EMERGENCY MEDICINE

## 2023-01-01 PROCEDURE — 84443 ASSAY THYROID STIM HORMONE: CPT | Performed by: PATHOLOGY

## 2023-01-01 PROCEDURE — 84165 PROTEIN E-PHORESIS SERUM: CPT | Mod: TC | Performed by: STUDENT IN AN ORGANIZED HEALTH CARE EDUCATION/TRAINING PROGRAM

## 2023-01-01 PROCEDURE — 73562 X-RAY EXAM OF KNEE 3: CPT | Mod: RT | Performed by: RADIOLOGY

## 2023-01-01 PROCEDURE — 87633 RESP VIRUS 12-25 TARGETS: CPT | Performed by: INTERNAL MEDICINE

## 2023-01-01 PROCEDURE — 93922 UPR/L XTREMITY ART 2 LEVELS: CPT | Performed by: RADIOLOGY

## 2023-01-01 PROCEDURE — 83615 LACTATE (LD) (LDH) ENZYME: CPT | Performed by: INTERNAL MEDICINE

## 2023-01-01 PROCEDURE — 83550 IRON BINDING TEST: CPT

## 2023-01-01 PROCEDURE — 85025 COMPLETE CBC W/AUTO DIFF WBC: CPT | Performed by: INTERNAL MEDICINE

## 2023-01-01 PROCEDURE — 85025 COMPLETE CBC W/AUTO DIFF WBC: CPT | Performed by: EMERGENCY MEDICINE

## 2023-01-01 PROCEDURE — 84155 ASSAY OF PROTEIN SERUM: CPT | Performed by: INTERNAL MEDICINE

## 2023-01-01 PROCEDURE — 85004 AUTOMATED DIFF WBC COUNT: CPT | Performed by: INTERNAL MEDICINE

## 2023-01-01 PROCEDURE — 97162 PT EVAL MOD COMPLEX 30 MIN: CPT | Mod: GP

## 2023-01-01 PROCEDURE — 73630 X-RAY EXAM OF FOOT: CPT | Mod: 26 | Performed by: RADIOLOGY

## 2023-01-01 PROCEDURE — 80053 COMPREHEN METABOLIC PANEL: CPT | Performed by: EMERGENCY MEDICINE

## 2023-01-01 PROCEDURE — 80048 BASIC METABOLIC PNL TOTAL CA: CPT

## 2023-01-01 PROCEDURE — 73630 X-RAY EXAM OF FOOT: CPT | Mod: LT

## 2023-01-01 PROCEDURE — 92610 EVALUATE SWALLOWING FUNCTION: CPT | Mod: GN

## 2023-01-01 PROCEDURE — 73700 CT LOWER EXTREMITY W/O DYE: CPT | Mod: LT

## 2023-01-01 PROCEDURE — 82565 ASSAY OF CREATININE: CPT | Performed by: PATHOLOGY

## 2023-01-01 PROCEDURE — 99233 SBSQ HOSP IP/OBS HIGH 50: CPT | Performed by: INTERNAL MEDICINE

## 2023-01-01 PROCEDURE — 999N000111 HC STATISTIC OT IP EVAL DEFER

## 2023-01-01 PROCEDURE — 99397 PER PM REEVAL EST PAT 65+ YR: CPT | Performed by: INTERNAL MEDICINE

## 2023-01-01 PROCEDURE — 82728 ASSAY OF FERRITIN: CPT | Performed by: INTERNAL MEDICINE

## 2023-01-01 PROCEDURE — 87086 URINE CULTURE/COLONY COUNT: CPT | Performed by: INTERNAL MEDICINE

## 2023-01-01 PROCEDURE — 85652 RBC SED RATE AUTOMATED: CPT | Performed by: EMERGENCY MEDICINE

## 2023-01-01 PROCEDURE — 80048 BASIC METABOLIC PNL TOTAL CA: CPT | Performed by: PATHOLOGY

## 2023-01-01 PROCEDURE — 85652 RBC SED RATE AUTOMATED: CPT | Performed by: PATHOLOGY

## 2023-01-01 PROCEDURE — XW033E5 INTRODUCTION OF REMDESIVIR ANTI-INFECTIVE INTO PERIPHERAL VEIN, PERCUTANEOUS APPROACH, NEW TECHNOLOGY GROUP 5: ICD-10-PCS | Performed by: INTERNAL MEDICINE

## 2023-01-01 PROCEDURE — 83921 ORGANIC ACID SINGLE QUANT: CPT | Performed by: INTERNAL MEDICINE

## 2023-01-01 PROCEDURE — 99214 OFFICE O/P EST MOD 30 MIN: CPT | Mod: 95 | Performed by: INTERNAL MEDICINE

## 2023-01-01 PROCEDURE — 93005 ELECTROCARDIOGRAM TRACING: CPT

## 2023-01-01 PROCEDURE — 85610 PROTHROMBIN TIME: CPT | Performed by: INTERNAL MEDICINE

## 2023-01-01 PROCEDURE — 99204 OFFICE O/P NEW MOD 45 MIN: CPT | Mod: 25 | Performed by: PREVENTIVE MEDICINE

## 2023-01-01 PROCEDURE — 85025 COMPLETE CBC W/AUTO DIFF WBC: CPT | Performed by: PATHOLOGY

## 2023-01-01 PROCEDURE — 99285 EMERGENCY DEPT VISIT HI MDM: CPT | Performed by: EMERGENCY MEDICINE

## 2023-01-01 PROCEDURE — 99223 1ST HOSP IP/OBS HIGH 75: CPT | Mod: GC | Performed by: STUDENT IN AN ORGANIZED HEALTH CARE EDUCATION/TRAINING PROGRAM

## 2023-01-01 PROCEDURE — 36415 COLL VENOUS BLD VENIPUNCTURE: CPT | Performed by: EMERGENCY MEDICINE

## 2023-01-01 PROCEDURE — 82550 ASSAY OF CK (CPK): CPT | Performed by: PATHOLOGY

## 2023-01-01 PROCEDURE — 97110 THERAPEUTIC EXERCISES: CPT | Mod: GP

## 2023-01-01 PROCEDURE — 74174 CTA ABD&PLVS W/CONTRAST: CPT | Mod: GC | Performed by: RADIOLOGY

## 2023-01-01 PROCEDURE — 84165 PROTEIN E-PHORESIS SERUM: CPT | Mod: 26

## 2023-01-01 PROCEDURE — 96360 HYDRATION IV INFUSION INIT: CPT

## 2023-01-01 PROCEDURE — 73700 CT LOWER EXTREMITY W/O DYE: CPT | Mod: 26 | Performed by: RADIOLOGY

## 2023-01-01 PROCEDURE — 99239 HOSP IP/OBS DSCHRG MGMT >30: CPT | Performed by: INTERNAL MEDICINE

## 2023-01-01 PROCEDURE — 86140 C-REACTIVE PROTEIN: CPT | Performed by: PATHOLOGY

## 2023-01-01 PROCEDURE — 99223 1ST HOSP IP/OBS HIGH 75: CPT | Performed by: INTERNAL MEDICINE

## 2023-01-01 PROCEDURE — 93926 LOWER EXTREMITY STUDY: CPT | Mod: LT | Performed by: RADIOLOGY

## 2023-01-01 PROCEDURE — 74018 RADEX ABDOMEN 1 VIEW: CPT

## 2023-01-01 PROCEDURE — 258N000003 HC RX IP 258 OP 636: Performed by: SURGERY

## 2023-01-01 PROCEDURE — 87635 SARS-COV-2 COVID-19 AMP PRB: CPT | Performed by: INTERNAL MEDICINE

## 2023-01-01 RX ORDER — METOPROLOL TARTRATE 25 MG/1
25 TABLET, FILM COATED ORAL 2 TIMES DAILY
Status: DISCONTINUED | OUTPATIENT
Start: 2023-01-01 | End: 2023-01-01

## 2023-01-01 RX ORDER — WARFARIN SODIUM 2.5 MG/1
2.5 TABLET ORAL
Status: DISCONTINUED | OUTPATIENT
Start: 2023-01-01 | End: 2023-01-01 | Stop reason: HOSPADM

## 2023-01-01 RX ORDER — WARFARIN SODIUM 5 MG/1
5 TABLET ORAL
Status: COMPLETED | OUTPATIENT
Start: 2023-01-01 | End: 2023-01-01

## 2023-01-01 RX ORDER — CEPHALEXIN 500 MG/1
500 CAPSULE ORAL 2 TIMES DAILY
Qty: 10 CAPSULE | Refills: 0 | Status: ON HOLD | OUTPATIENT
Start: 2023-01-01 | End: 2023-01-01

## 2023-01-01 RX ORDER — WARFARIN SODIUM 4 MG/1
4 TABLET ORAL
Status: COMPLETED | OUTPATIENT
Start: 2023-01-01 | End: 2023-01-01

## 2023-01-01 RX ORDER — IOPAMIDOL 755 MG/ML
90 INJECTION, SOLUTION INTRAVASCULAR ONCE
Status: COMPLETED | OUTPATIENT
Start: 2023-01-01 | End: 2023-01-01

## 2023-01-01 RX ORDER — RISPERIDONE 1 MG/1
1 TABLET ORAL AT BEDTIME
Status: DISCONTINUED | OUTPATIENT
Start: 2023-01-01 | End: 2023-01-01 | Stop reason: HOSPADM

## 2023-01-01 RX ORDER — SERTRALINE HYDROCHLORIDE 100 MG/1
200 TABLET, FILM COATED ORAL EVERY EVENING
Status: DISCONTINUED | OUTPATIENT
Start: 2023-01-01 | End: 2023-01-01 | Stop reason: HOSPADM

## 2023-01-01 RX ORDER — AMOXICILLIN AND CLAVULANATE POTASSIUM 500; 125 MG/1; MG/1
1 TABLET, FILM COATED ORAL 3 TIMES DAILY
Qty: 90 TABLET | Refills: 0 | Status: SHIPPED | OUTPATIENT
Start: 2023-01-01 | End: 2024-01-01

## 2023-01-01 RX ORDER — PIPERACILLIN SODIUM, TAZOBACTAM SODIUM 2; .25 G/10ML; G/10ML
2.25 INJECTION, POWDER, LYOPHILIZED, FOR SOLUTION INTRAVENOUS EVERY 6 HOURS
Status: COMPLETED | OUTPATIENT
Start: 2023-01-01 | End: 2023-01-01

## 2023-01-01 RX ORDER — CALCIUM CARBONATE 500(1250)
500 TABLET ORAL 2 TIMES DAILY
Status: DISCONTINUED | OUTPATIENT
Start: 2023-01-01 | End: 2023-01-01 | Stop reason: HOSPADM

## 2023-01-01 RX ORDER — TAMSULOSIN HYDROCHLORIDE 0.4 MG/1
0.8 CAPSULE ORAL EVERY EVENING
Status: DISCONTINUED | OUTPATIENT
Start: 2023-01-01 | End: 2023-01-01 | Stop reason: HOSPADM

## 2023-01-01 RX ORDER — LANOLIN ALCOHOL/MO/W.PET/CERES
2000 CREAM (GRAM) TOPICAL DAILY
Status: DISCONTINUED | OUTPATIENT
Start: 2023-01-01 | End: 2023-01-01 | Stop reason: HOSPADM

## 2023-01-01 RX ORDER — WARFARIN SODIUM 5 MG/1
TABLET ORAL
Qty: 65 TABLET | Refills: 1 | Status: SHIPPED | OUTPATIENT
Start: 2023-01-01 | End: 2023-01-01

## 2023-01-01 RX ORDER — BACLOFEN 10 MG/1
10 TABLET ORAL 3 TIMES DAILY
Qty: 60 TABLET | Refills: 0 | Status: ON HOLD | OUTPATIENT
Start: 2023-01-01 | End: 2024-01-01

## 2023-01-01 RX ORDER — LACTOBACILLUS RHAMNOSUS GG 10B CELL
1 CAPSULE ORAL 2 TIMES DAILY
Status: ON HOLD | COMMUNITY
End: 2024-01-01

## 2023-01-01 RX ORDER — LIDOCAINE HYDROCHLORIDE 10 MG/ML
3 INJECTION, SOLUTION EPIDURAL; INFILTRATION; INTRACAUDAL; PERINEURAL
Status: DISCONTINUED | OUTPATIENT
Start: 2023-01-01 | End: 2023-01-01

## 2023-01-01 RX ORDER — AMOXICILLIN AND CLAVULANATE POTASSIUM 500; 125 MG/1; MG/1
1 TABLET, FILM COATED ORAL EVERY 8 HOURS SCHEDULED
Status: DISCONTINUED | OUTPATIENT
Start: 2023-01-01 | End: 2023-01-01 | Stop reason: HOSPADM

## 2023-01-01 RX ORDER — VANCOMYCIN HYDROCHLORIDE 1 G/200ML
1000 INJECTION, SOLUTION INTRAVENOUS EVERY 24 HOURS
Status: COMPLETED | OUTPATIENT
Start: 2023-01-01 | End: 2023-01-01

## 2023-01-01 RX ORDER — POTASSIUM CHLORIDE 750 MG/1
40 TABLET, EXTENDED RELEASE ORAL ONCE
Status: COMPLETED | OUTPATIENT
Start: 2023-01-01 | End: 2023-01-01

## 2023-01-01 RX ORDER — LIDOCAINE HYDROCHLORIDE 10 MG/ML
4 INJECTION, SOLUTION EPIDURAL; INFILTRATION; INTRACAUDAL; PERINEURAL
Status: DISCONTINUED | OUTPATIENT
Start: 2023-01-01 | End: 2023-01-01

## 2023-01-01 RX ORDER — ACETAMINOPHEN 500 MG
1000 TABLET ORAL 2 TIMES DAILY
Status: DISCONTINUED | OUTPATIENT
Start: 2023-01-01 | End: 2023-01-01 | Stop reason: HOSPADM

## 2023-01-01 RX ORDER — RISPERIDONE 1 MG/1
1 TABLET ORAL DAILY
Status: DISCONTINUED | OUTPATIENT
Start: 2023-01-01 | End: 2023-01-01

## 2023-01-01 RX ORDER — ACETAMINOPHEN AND CODEINE PHOSPHATE 300; 30 MG/1; MG/1
1 TABLET ORAL EVERY 8 HOURS PRN
Qty: 30 TABLET | Refills: 0 | Status: SHIPPED | OUTPATIENT
Start: 2023-01-01 | End: 2024-01-01

## 2023-01-01 RX ORDER — POLYETHYLENE GLYCOL 3350 17 G/17G
17 POWDER, FOR SOLUTION ORAL DAILY
Status: DISCONTINUED | OUTPATIENT
Start: 2023-01-01 | End: 2023-01-01 | Stop reason: HOSPADM

## 2023-01-01 RX ORDER — SENNOSIDES 8.6 MG
2 TABLET ORAL 2 TIMES DAILY
Status: DISCONTINUED | OUTPATIENT
Start: 2023-01-01 | End: 2023-01-01

## 2023-01-01 RX ORDER — KETOCONAZOLE 20 MG/G
CREAM TOPICAL 2 TIMES DAILY
Qty: 60 G | Refills: 1 | Status: ON HOLD | OUTPATIENT
Start: 2023-01-01 | End: 2024-01-01

## 2023-01-01 RX ORDER — POLYETHYLENE GLYCOL 3350 17 G/17G
17 POWDER, FOR SOLUTION ORAL DAILY
Qty: 510 G | Refills: 0 | Status: ON HOLD | OUTPATIENT
Start: 2023-01-01 | End: 2024-01-01

## 2023-01-01 RX ORDER — SULFAMETHOXAZOLE/TRIMETHOPRIM 800-160 MG
1 TABLET ORAL 2 TIMES DAILY
Qty: 6 TABLET | Refills: 0 | Status: SHIPPED | OUTPATIENT
Start: 2023-01-01 | End: 2023-01-01

## 2023-01-01 RX ORDER — MIRTAZAPINE 15 MG/1
15 TABLET, FILM COATED ORAL AT BEDTIME
Status: DISCONTINUED | OUTPATIENT
Start: 2023-01-01 | End: 2023-01-01 | Stop reason: HOSPADM

## 2023-01-01 RX ORDER — AMOXICILLIN 250 MG
2 CAPSULE ORAL 2 TIMES DAILY PRN
Status: DISCONTINUED | OUTPATIENT
Start: 2023-01-01 | End: 2023-01-01

## 2023-01-01 RX ORDER — WARFARIN SODIUM 3 MG/1
3 TABLET ORAL
Status: DISCONTINUED | OUTPATIENT
Start: 2023-01-01 | End: 2023-01-01

## 2023-01-01 RX ORDER — WARFARIN SODIUM 1 MG/1
1 TABLET ORAL
Status: COMPLETED | OUTPATIENT
Start: 2023-01-01 | End: 2023-01-01

## 2023-01-01 RX ORDER — BACLOFEN 10 MG/1
10 TABLET ORAL 3 TIMES DAILY
Status: DISCONTINUED | OUTPATIENT
Start: 2023-01-01 | End: 2023-01-01

## 2023-01-01 RX ORDER — METOPROLOL TARTRATE 25 MG/1
12.5 TABLET, FILM COATED ORAL 2 TIMES DAILY
Status: ON HOLD
Start: 2023-01-01 | End: 2024-01-01

## 2023-01-01 RX ORDER — METOPROLOL TARTRATE 25 MG/1
25 TABLET, FILM COATED ORAL 2 TIMES DAILY
Qty: 180 TABLET | Refills: 3 | Status: ON HOLD | OUTPATIENT
Start: 2023-01-01 | End: 2023-01-01

## 2023-01-01 RX ORDER — AMOXICILLIN AND CLAVULANATE POTASSIUM 500; 125 MG/1; MG/1
1 TABLET, FILM COATED ORAL EVERY 12 HOURS SCHEDULED
Status: DISCONTINUED | OUTPATIENT
Start: 2023-01-01 | End: 2023-01-01

## 2023-01-01 RX ORDER — TRIAMCINOLONE ACETONIDE 40 MG/ML
40 INJECTION, SUSPENSION INTRA-ARTICULAR; INTRAMUSCULAR
Status: DISCONTINUED | OUTPATIENT
Start: 2023-01-01 | End: 2023-01-01

## 2023-01-01 RX ORDER — WARFARIN SODIUM 5 MG/1
TABLET ORAL
Qty: 90 TABLET | Refills: 1 | Status: SHIPPED | OUTPATIENT
Start: 2023-01-01 | End: 2023-01-01

## 2023-01-01 RX ORDER — AMOXICILLIN 250 MG
1 CAPSULE ORAL 2 TIMES DAILY
Status: DISCONTINUED | OUTPATIENT
Start: 2023-01-01 | End: 2023-01-01

## 2023-01-01 RX ORDER — RISPERIDONE 0.25 MG/1
0.5 TABLET ORAL DAILY
Status: DISCONTINUED | OUTPATIENT
Start: 2023-01-01 | End: 2023-01-01 | Stop reason: HOSPADM

## 2023-01-01 RX ORDER — ONDANSETRON 2 MG/ML
4 INJECTION INTRAMUSCULAR; INTRAVENOUS EVERY 6 HOURS PRN
Status: DISCONTINUED | OUTPATIENT
Start: 2023-01-01 | End: 2023-01-01 | Stop reason: HOSPADM

## 2023-01-01 RX ORDER — DOXYCYCLINE 100 MG/1
100 CAPSULE ORAL EVERY 12 HOURS SCHEDULED
Status: DISCONTINUED | OUTPATIENT
Start: 2023-01-01 | End: 2023-01-01 | Stop reason: HOSPADM

## 2023-01-01 RX ORDER — BISACODYL 10 MG
10 SUPPOSITORY, RECTAL RECTAL ONCE
Status: DISCONTINUED | OUTPATIENT
Start: 2023-01-01 | End: 2023-01-01

## 2023-01-01 RX ORDER — BENZONATATE 100 MG/1
100 CAPSULE ORAL 3 TIMES DAILY PRN
Status: DISCONTINUED | OUTPATIENT
Start: 2023-01-01 | End: 2023-01-01 | Stop reason: HOSPADM

## 2023-01-01 RX ORDER — ONDANSETRON 4 MG/1
4 TABLET, ORALLY DISINTEGRATING ORAL EVERY 6 HOURS PRN
Status: DISCONTINUED | OUTPATIENT
Start: 2023-01-01 | End: 2023-01-01 | Stop reason: HOSPADM

## 2023-01-01 RX ORDER — WARFARIN SODIUM 5 MG/1
TABLET ORAL
Qty: 64 TABLET | Refills: 1 | Status: ON HOLD | OUTPATIENT
Start: 2023-01-01 | End: 2024-01-01

## 2023-01-01 RX ORDER — DOXYCYCLINE 100 MG/1
100 CAPSULE ORAL EVERY 12 HOURS
Qty: 60 CAPSULE | Refills: 0 | Status: SHIPPED | OUTPATIENT
Start: 2023-01-01 | End: 2024-01-01

## 2023-01-01 RX ORDER — WARFARIN SODIUM 5 MG/1
5 TABLET ORAL
Qty: 1 TABLET | Refills: 0 | Status: COMPLETED | OUTPATIENT
Start: 2023-01-01 | End: 2023-01-01

## 2023-01-01 RX ORDER — SODIUM CHLORIDE, SODIUM LACTATE, POTASSIUM CHLORIDE, CALCIUM CHLORIDE 600; 310; 30; 20 MG/100ML; MG/100ML; MG/100ML; MG/100ML
INJECTION, SOLUTION INTRAVENOUS
Status: COMPLETED
Start: 2023-01-01 | End: 2023-01-01

## 2023-01-01 RX ORDER — POLYETHYLENE GLYCOL 3350 17 G/17G
17 POWDER, FOR SOLUTION ORAL 2 TIMES DAILY PRN
Status: DISCONTINUED | OUTPATIENT
Start: 2023-01-01 | End: 2023-01-01

## 2023-01-01 RX ORDER — LANOLIN ALCOHOL/MO/W.PET/CERES
1000 CREAM (GRAM) TOPICAL DAILY
Status: DISCONTINUED | OUTPATIENT
Start: 2023-01-01 | End: 2023-01-01

## 2023-01-01 RX ORDER — WARFARIN SODIUM 2 MG/1
2 TABLET ORAL
Status: COMPLETED | OUTPATIENT
Start: 2023-01-01 | End: 2023-01-01

## 2023-01-01 RX ORDER — AMOXICILLIN AND CLAVULANATE POTASSIUM 500; 125 MG/1; MG/1
1 TABLET, FILM COATED ORAL 2 TIMES DAILY
Status: ON HOLD | COMMUNITY
End: 2023-01-01

## 2023-01-01 RX ORDER — TAMSULOSIN HYDROCHLORIDE 0.4 MG/1
0.8 CAPSULE ORAL DAILY
Qty: 180 CAPSULE | Refills: 2 | Status: ON HOLD | OUTPATIENT
Start: 2023-01-01 | End: 2024-01-01

## 2023-01-01 RX ORDER — RISPERIDONE 0.5 MG/1
TABLET ORAL DAILY
Status: ON HOLD | COMMUNITY
Start: 2023-01-01 | End: 2024-01-01

## 2023-01-01 RX ORDER — VITAMIN B COMPLEX
25 TABLET ORAL 2 TIMES DAILY
Status: DISCONTINUED | OUTPATIENT
Start: 2023-01-01 | End: 2023-01-01 | Stop reason: HOSPADM

## 2023-01-01 RX ORDER — TAMSULOSIN HYDROCHLORIDE 0.4 MG/1
0.8 CAPSULE ORAL DAILY
Qty: 180 CAPSULE | Refills: 2 | Status: SHIPPED | OUTPATIENT
Start: 2023-01-01 | End: 2023-01-01

## 2023-01-01 RX ORDER — ALENDRONATE SODIUM 70 MG/1
70 TABLET ORAL
Qty: 12 TABLET | Refills: 3 | Status: ON HOLD | OUTPATIENT
Start: 2023-01-01 | End: 2024-01-01

## 2023-01-01 RX ORDER — AMOXICILLIN AND CLAVULANATE POTASSIUM 500; 125 MG/1; MG/1
1 TABLET, FILM COATED ORAL EVERY 8 HOURS SCHEDULED
Status: DISCONTINUED | OUTPATIENT
Start: 2023-01-01 | End: 2023-01-01

## 2023-01-01 RX ORDER — POLYETHYLENE GLYCOL 3350 17 G/17G
17 POWDER, FOR SOLUTION ORAL 2 TIMES DAILY
Status: DISCONTINUED | OUTPATIENT
Start: 2023-01-01 | End: 2023-01-01

## 2023-01-01 RX ORDER — SENNOSIDES 8.6 MG
1-2 TABLET ORAL 2 TIMES DAILY PRN
Status: DISCONTINUED | OUTPATIENT
Start: 2023-01-01 | End: 2023-01-01 | Stop reason: HOSPADM

## 2023-01-01 RX ORDER — CALCIUM CARBONATE 500 MG/1
1000 TABLET, CHEWABLE ORAL 4 TIMES DAILY PRN
Status: DISCONTINUED | OUTPATIENT
Start: 2023-01-01 | End: 2023-01-01 | Stop reason: HOSPADM

## 2023-01-01 RX ORDER — AMOXICILLIN 250 MG
1 CAPSULE ORAL 2 TIMES DAILY PRN
Status: DISCONTINUED | OUTPATIENT
Start: 2023-01-01 | End: 2023-01-01

## 2023-01-01 RX ORDER — LIDOCAINE 40 MG/G
CREAM TOPICAL
Status: DISCONTINUED | OUTPATIENT
Start: 2023-01-01 | End: 2023-01-01 | Stop reason: HOSPADM

## 2023-01-01 RX ORDER — WARFARIN SODIUM 5 MG/1
5 TABLET ORAL
Status: DISCONTINUED | OUTPATIENT
Start: 2023-01-01 | End: 2023-01-01

## 2023-01-01 RX ORDER — ALENDRONATE SODIUM 70 MG/1
70 TABLET ORAL
Status: DISCONTINUED | OUTPATIENT
Start: 2023-01-01 | End: 2023-01-01 | Stop reason: HOSPADM

## 2023-01-01 RX ADMIN — PIPERACILLIN AND TAZOBACTAM 2.25 G: 2; .25 INJECTION, POWDER, FOR SOLUTION INTRAVENOUS at 14:42

## 2023-01-01 RX ADMIN — ACETAMINOPHEN 1000 MG: 500 TABLET ORAL at 10:00

## 2023-01-01 RX ADMIN — AMOXICILLIN AND CLAVULANATE POTASSIUM 1 TABLET: 500; 125 TABLET, FILM COATED ORAL at 05:51

## 2023-01-01 RX ADMIN — RISPERIDONE 1 MG: 1 TABLET ORAL at 21:47

## 2023-01-01 RX ADMIN — CYANOCOBALAMIN TAB 1000 MCG 2000 MCG: 1000 TAB at 08:59

## 2023-01-01 RX ADMIN — AMOXICILLIN AND CLAVULANATE POTASSIUM 1 TABLET: 500; 125 TABLET, FILM COATED ORAL at 21:35

## 2023-01-01 RX ADMIN — VANCOMYCIN HYDROCHLORIDE 1000 MG: 1 INJECTION, SOLUTION INTRAVENOUS at 16:22

## 2023-01-01 RX ADMIN — ACETAMINOPHEN 1000 MG: 500 TABLET ORAL at 20:24

## 2023-01-01 RX ADMIN — MIRTAZAPINE 15 MG: 15 TABLET, FILM COATED ORAL at 22:25

## 2023-01-01 RX ADMIN — DOXYCYCLINE HYCLATE 100 MG: 100 CAPSULE ORAL at 20:39

## 2023-01-01 RX ADMIN — ACETAMINOPHEN 1000 MG: 500 TABLET ORAL at 08:59

## 2023-01-01 RX ADMIN — PIPERACILLIN AND TAZOBACTAM 2.25 G: 2; .25 INJECTION, POWDER, FOR SOLUTION INTRAVENOUS at 13:15

## 2023-01-01 RX ADMIN — Medication 12.5 MG: at 08:48

## 2023-01-01 RX ADMIN — CYANOCOBALAMIN TAB 1000 MCG 2000 MCG: 1000 TAB at 08:02

## 2023-01-01 RX ADMIN — TAMSULOSIN HYDROCHLORIDE 0.8 MG: 0.4 CAPSULE ORAL at 20:01

## 2023-01-01 RX ADMIN — WARFARIN SODIUM 4 MG: 4 TABLET ORAL at 18:04

## 2023-01-01 RX ADMIN — SERTRALINE HYDROCHLORIDE 200 MG: 100 TABLET ORAL at 20:29

## 2023-01-01 RX ADMIN — DOXYCYCLINE HYCLATE 100 MG: 100 CAPSULE ORAL at 09:43

## 2023-01-01 RX ADMIN — SERTRALINE HYDROCHLORIDE 200 MG: 100 TABLET ORAL at 20:39

## 2023-01-01 RX ADMIN — AMOXICILLIN AND CLAVULANATE POTASSIUM 1 TABLET: 500; 125 TABLET, FILM COATED ORAL at 06:17

## 2023-01-01 RX ADMIN — CYANOCOBALAMIN TAB 1000 MCG 2000 MCG: 1000 TAB at 08:39

## 2023-01-01 RX ADMIN — MIRTAZAPINE 15 MG: 15 TABLET, FILM COATED ORAL at 22:52

## 2023-01-01 RX ADMIN — Medication 12.5 MG: at 07:56

## 2023-01-01 RX ADMIN — SERTRALINE HYDROCHLORIDE 200 MG: 100 TABLET ORAL at 20:23

## 2023-01-01 RX ADMIN — Medication 25 MCG: at 08:51

## 2023-01-01 RX ADMIN — Medication 25 MCG: at 21:06

## 2023-01-01 RX ADMIN — SODIUM CHLORIDE, POTASSIUM CHLORIDE, SODIUM LACTATE AND CALCIUM CHLORIDE 1000 ML: 600; 310; 30; 20 INJECTION, SOLUTION INTRAVENOUS at 12:33

## 2023-01-01 RX ADMIN — CALCIUM 500 MG: 500 TABLET ORAL at 19:45

## 2023-01-01 RX ADMIN — MIRTAZAPINE 15 MG: 15 TABLET, FILM COATED ORAL at 22:39

## 2023-01-01 RX ADMIN — CYANOCOBALAMIN TAB 1000 MCG 2000 MCG: 1000 TAB at 09:51

## 2023-01-01 RX ADMIN — RISPERIDONE 0.5 MG: 0.25 TABLET, FILM COATED ORAL at 08:02

## 2023-01-01 RX ADMIN — CALCIUM 500 MG: 500 TABLET ORAL at 10:00

## 2023-01-01 RX ADMIN — CALCIUM 500 MG: 500 TABLET ORAL at 20:19

## 2023-01-01 RX ADMIN — TAMSULOSIN HYDROCHLORIDE 0.8 MG: 0.4 CAPSULE ORAL at 21:17

## 2023-01-01 RX ADMIN — WARFARIN SODIUM 2 MG: 2 TABLET ORAL at 18:06

## 2023-01-01 RX ADMIN — POLYETHYLENE GLYCOL 3350 17 G: 17 POWDER, FOR SOLUTION ORAL at 08:47

## 2023-01-01 RX ADMIN — TAMSULOSIN HYDROCHLORIDE 0.8 MG: 0.4 CAPSULE ORAL at 20:25

## 2023-01-01 RX ADMIN — Medication 12.5 MG: at 09:59

## 2023-01-01 RX ADMIN — PIPERACILLIN AND TAZOBACTAM 2.25 G: 2; .25 INJECTION, POWDER, FOR SOLUTION INTRAVENOUS at 07:52

## 2023-01-01 RX ADMIN — SERTRALINE HYDROCHLORIDE 200 MG: 100 TABLET ORAL at 20:05

## 2023-01-01 RX ADMIN — LIDOCAINE HYDROCHLORIDE 3 ML: 10 INJECTION, SOLUTION EPIDURAL; INFILTRATION; INTRACAUDAL; PERINEURAL at 13:17

## 2023-01-01 RX ADMIN — AMOXICILLIN AND CLAVULANATE POTASSIUM 1 TABLET: 500; 125 TABLET, FILM COATED ORAL at 22:31

## 2023-01-01 RX ADMIN — DOXYCYCLINE HYCLATE 100 MG: 100 CAPSULE ORAL at 20:30

## 2023-01-01 RX ADMIN — RISPERIDONE 1 MG: 1 TABLET ORAL at 21:45

## 2023-01-01 RX ADMIN — Medication 25 MCG: at 20:40

## 2023-01-01 RX ADMIN — Medication 12.5 MG: at 21:15

## 2023-01-01 RX ADMIN — Medication 25 MCG: at 08:39

## 2023-01-01 RX ADMIN — CYANOCOBALAMIN TAB 1000 MCG 2000 MCG: 1000 TAB at 08:48

## 2023-01-01 RX ADMIN — POLYETHYLENE GLYCOL 3350 17 G: 17 POWDER, FOR SOLUTION ORAL at 08:14

## 2023-01-01 RX ADMIN — PIPERACILLIN AND TAZOBACTAM 2.25 G: 2; .25 INJECTION, POWDER, FOR SOLUTION INTRAVENOUS at 03:24

## 2023-01-01 RX ADMIN — TAMSULOSIN HYDROCHLORIDE 0.8 MG: 0.4 CAPSULE ORAL at 20:40

## 2023-01-01 RX ADMIN — ACETAMINOPHEN 1000 MG: 500 TABLET ORAL at 20:29

## 2023-01-01 RX ADMIN — PIPERACILLIN AND TAZOBACTAM 2.25 G: 2; .25 INJECTION, POWDER, FOR SOLUTION INTRAVENOUS at 23:40

## 2023-01-01 RX ADMIN — MIRTAZAPINE 15 MG: 15 TABLET, FILM COATED ORAL at 23:10

## 2023-01-01 RX ADMIN — PIPERACILLIN AND TAZOBACTAM 2.25 G: 2; .25 INJECTION, POWDER, FOR SOLUTION INTRAVENOUS at 20:59

## 2023-01-01 RX ADMIN — CALCIUM 500 MG: 500 TABLET ORAL at 20:29

## 2023-01-01 RX ADMIN — CALCIUM 500 MG: 500 TABLET ORAL at 20:03

## 2023-01-01 RX ADMIN — ACETAMINOPHEN 1000 MG: 500 TABLET ORAL at 08:48

## 2023-01-01 RX ADMIN — CALCIUM 500 MG: 500 TABLET ORAL at 08:39

## 2023-01-01 RX ADMIN — ACETAMINOPHEN 1000 MG: 500 TABLET ORAL at 20:09

## 2023-01-01 RX ADMIN — Medication 25 MCG: at 19:45

## 2023-01-01 RX ADMIN — MIRTAZAPINE 15 MG: 15 TABLET, FILM COATED ORAL at 21:47

## 2023-01-01 RX ADMIN — PIPERACILLIN AND TAZOBACTAM 2.25 G: 2; .25 INJECTION, POWDER, FOR SOLUTION INTRAVENOUS at 05:12

## 2023-01-01 RX ADMIN — PIPERACILLIN AND TAZOBACTAM 2.25 G: 2; .25 INJECTION, POWDER, FOR SOLUTION INTRAVENOUS at 02:12

## 2023-01-01 RX ADMIN — TRIAMCINOLONE ACETONIDE 40 MG: 40 INJECTION, SUSPENSION INTRA-ARTICULAR; INTRAMUSCULAR at 14:17

## 2023-01-01 RX ADMIN — DOXYCYCLINE HYCLATE 100 MG: 100 CAPSULE ORAL at 08:48

## 2023-01-01 RX ADMIN — Medication 12.5 MG: at 19:44

## 2023-01-01 RX ADMIN — RISPERIDONE 0.5 MG: 0.25 TABLET, FILM COATED ORAL at 09:43

## 2023-01-01 RX ADMIN — Medication 25 MCG: at 22:10

## 2023-01-01 RX ADMIN — RISPERIDONE 0.5 MG: 0.25 TABLET, FILM COATED ORAL at 09:59

## 2023-01-01 RX ADMIN — CALCIUM 500 MG: 500 TABLET ORAL at 07:56

## 2023-01-01 RX ADMIN — Medication 12.5 MG: at 08:39

## 2023-01-01 RX ADMIN — CYANOCOBALAMIN TAB 1000 MCG 2000 MCG: 1000 TAB at 09:43

## 2023-01-01 RX ADMIN — Medication 25 MCG: at 08:02

## 2023-01-01 RX ADMIN — Medication 12.5 MG: at 21:07

## 2023-01-01 RX ADMIN — REMDESIVIR 200 MG: 100 INJECTION, POWDER, LYOPHILIZED, FOR SOLUTION INTRAVENOUS at 16:11

## 2023-01-01 RX ADMIN — PIPERACILLIN AND TAZOBACTAM 2.25 G: 2; .25 INJECTION, POWDER, FOR SOLUTION INTRAVENOUS at 20:06

## 2023-01-01 RX ADMIN — Medication 12.5 MG: at 08:46

## 2023-01-01 RX ADMIN — AMOXICILLIN AND CLAVULANATE POTASSIUM 1 TABLET: 500; 125 TABLET, FILM COATED ORAL at 06:44

## 2023-01-01 RX ADMIN — MIRTAZAPINE 15 MG: 15 TABLET, FILM COATED ORAL at 21:15

## 2023-01-01 RX ADMIN — AMOXICILLIN AND CLAVULANATE POTASSIUM 1 TABLET: 500; 125 TABLET, FILM COATED ORAL at 05:13

## 2023-01-01 RX ADMIN — CALCIUM 500 MG: 500 TABLET ORAL at 08:02

## 2023-01-01 RX ADMIN — ACETAMINOPHEN 1000 MG: 500 TABLET ORAL at 20:19

## 2023-01-01 RX ADMIN — MIRTAZAPINE 15 MG: 15 TABLET, FILM COATED ORAL at 22:31

## 2023-01-01 RX ADMIN — AMOXICILLIN AND CLAVULANATE POTASSIUM 1 TABLET: 500; 125 TABLET, FILM COATED ORAL at 14:02

## 2023-01-01 RX ADMIN — VANCOMYCIN HYDROCHLORIDE 1500 MG: 10 INJECTION, POWDER, LYOPHILIZED, FOR SOLUTION INTRAVENOUS at 20:01

## 2023-01-01 RX ADMIN — WARFARIN SODIUM 5 MG: 5 TABLET ORAL at 18:05

## 2023-01-01 RX ADMIN — TAMSULOSIN HYDROCHLORIDE 0.8 MG: 0.4 CAPSULE ORAL at 20:05

## 2023-01-01 RX ADMIN — VANCOMYCIN HYDROCHLORIDE 750 MG: 1 INJECTION, POWDER, LYOPHILIZED, FOR SOLUTION INTRAVENOUS at 12:28

## 2023-01-01 RX ADMIN — ACETAMINOPHEN 1000 MG: 500 TABLET ORAL at 21:17

## 2023-01-01 RX ADMIN — PIPERACILLIN AND TAZOBACTAM 2.25 G: 2; .25 INJECTION, POWDER, FOR SOLUTION INTRAVENOUS at 17:38

## 2023-01-01 RX ADMIN — WARFARIN SODIUM 1 MG: 1 TABLET ORAL at 18:40

## 2023-01-01 RX ADMIN — SERTRALINE HYDROCHLORIDE 200 MG: 100 TABLET ORAL at 20:06

## 2023-01-01 RX ADMIN — Medication 12.5 MG: at 09:43

## 2023-01-01 RX ADMIN — ACETAMINOPHEN 1000 MG: 500 TABLET ORAL at 20:05

## 2023-01-01 RX ADMIN — WARFARIN SODIUM 5 MG: 5 TABLET ORAL at 19:46

## 2023-01-01 RX ADMIN — WARFARIN SODIUM 5 MG: 5 TABLET ORAL at 21:47

## 2023-01-01 RX ADMIN — CALCIUM 500 MG: 500 TABLET ORAL at 21:06

## 2023-01-01 RX ADMIN — SERTRALINE HYDROCHLORIDE 200 MG: 100 TABLET ORAL at 20:24

## 2023-01-01 RX ADMIN — DOXYCYCLINE HYCLATE 100 MG: 100 CAPSULE ORAL at 20:23

## 2023-01-01 RX ADMIN — LIDOCAINE HYDROCHLORIDE 4 ML: 10 INJECTION, SOLUTION EPIDURAL; INFILTRATION; INTRACAUDAL; PERINEURAL at 14:17

## 2023-01-01 RX ADMIN — RISPERIDONE 1 MG: 1 TABLET ORAL at 21:06

## 2023-01-01 RX ADMIN — RISPERIDONE 1 MG: 1 TABLET ORAL at 21:15

## 2023-01-01 RX ADMIN — CALCIUM 500 MG: 500 TABLET ORAL at 19:22

## 2023-01-01 RX ADMIN — RISPERIDONE 0.5 MG: 0.25 TABLET, FILM COATED ORAL at 09:51

## 2023-01-01 RX ADMIN — DOXYCYCLINE HYCLATE 100 MG: 100 CAPSULE ORAL at 08:46

## 2023-01-01 RX ADMIN — ACETAMINOPHEN 1000 MG: 500 TABLET ORAL at 19:47

## 2023-01-01 RX ADMIN — Medication 12.5 MG: at 08:03

## 2023-01-01 RX ADMIN — AMOXICILLIN AND CLAVULANATE POTASSIUM 1 TABLET: 500; 125 TABLET, FILM COATED ORAL at 22:25

## 2023-01-01 RX ADMIN — RISPERIDONE 0.5 MG: 0.25 TABLET, FILM COATED ORAL at 08:15

## 2023-01-01 RX ADMIN — Medication 12.5 MG: at 20:40

## 2023-01-01 RX ADMIN — Medication 25 MCG: at 20:05

## 2023-01-01 RX ADMIN — RISPERIDONE 0.5 MG: 0.25 TABLET, FILM COATED ORAL at 08:48

## 2023-01-01 RX ADMIN — Medication 25 MCG: at 19:52

## 2023-01-01 RX ADMIN — Medication 12.5 MG: at 08:59

## 2023-01-01 RX ADMIN — Medication 25 MCG: at 09:43

## 2023-01-01 RX ADMIN — DOXYCYCLINE HYCLATE 100 MG: 100 CAPSULE ORAL at 20:05

## 2023-01-01 RX ADMIN — RISPERIDONE 0.5 MG: 0.25 TABLET, FILM COATED ORAL at 07:56

## 2023-01-01 RX ADMIN — MIRTAZAPINE 15 MG: 15 TABLET, FILM COATED ORAL at 22:24

## 2023-01-01 RX ADMIN — ACETAMINOPHEN 1000 MG: 500 TABLET ORAL at 20:03

## 2023-01-01 RX ADMIN — ACETAMINOPHEN 1000 MG: 500 TABLET ORAL at 20:23

## 2023-01-01 RX ADMIN — Medication 12.5 MG: at 20:29

## 2023-01-01 RX ADMIN — TAMSULOSIN HYDROCHLORIDE 0.8 MG: 0.4 CAPSULE ORAL at 19:48

## 2023-01-01 RX ADMIN — Medication 25 MCG: at 08:46

## 2023-01-01 RX ADMIN — Medication 12.5 MG: at 20:01

## 2023-01-01 RX ADMIN — PIPERACILLIN AND TAZOBACTAM 2.25 G: 2; .25 INJECTION, POWDER, FOR SOLUTION INTRAVENOUS at 01:39

## 2023-01-01 RX ADMIN — Medication 25 MCG: at 09:51

## 2023-01-01 RX ADMIN — Medication 25 MCG: at 08:48

## 2023-01-01 RX ADMIN — SERTRALINE HYDROCHLORIDE 200 MG: 100 TABLET ORAL at 19:51

## 2023-01-01 RX ADMIN — DOXYCYCLINE HYCLATE 100 MG: 100 CAPSULE ORAL at 20:25

## 2023-01-01 RX ADMIN — CYANOCOBALAMIN TAB 1000 MCG 2000 MCG: 1000 TAB at 08:51

## 2023-01-01 RX ADMIN — WARFARIN SODIUM 5 MG: 5 TABLET ORAL at 19:22

## 2023-01-01 RX ADMIN — RISPERIDONE 0.5 MG: 0.25 TABLET, FILM COATED ORAL at 08:45

## 2023-01-01 RX ADMIN — RISPERIDONE 1 MG: 1 TABLET ORAL at 23:10

## 2023-01-01 RX ADMIN — DOXYCYCLINE HYCLATE 100 MG: 100 CAPSULE ORAL at 21:15

## 2023-01-01 RX ADMIN — TAMSULOSIN HYDROCHLORIDE 0.8 MG: 0.4 CAPSULE ORAL at 20:19

## 2023-01-01 RX ADMIN — Medication 12.5 MG: at 19:48

## 2023-01-01 RX ADMIN — RISPERIDONE 1 MG: 1 TABLET ORAL at 21:18

## 2023-01-01 RX ADMIN — ACETAMINOPHEN 1000 MG: 500 TABLET ORAL at 08:02

## 2023-01-01 RX ADMIN — Medication 25 MCG: at 20:29

## 2023-01-01 RX ADMIN — TAMSULOSIN HYDROCHLORIDE 0.8 MG: 0.4 CAPSULE ORAL at 20:29

## 2023-01-01 RX ADMIN — CALCIUM 500 MG: 500 TABLET ORAL at 20:05

## 2023-01-01 RX ADMIN — Medication 12.5 MG: at 20:06

## 2023-01-01 RX ADMIN — RISPERIDONE 1 MG: 1 TABLET ORAL at 22:24

## 2023-01-01 RX ADMIN — RISPERIDONE 0.5 MG: 0.25 TABLET, FILM COATED ORAL at 08:18

## 2023-01-01 RX ADMIN — TAMSULOSIN HYDROCHLORIDE 0.8 MG: 0.4 CAPSULE ORAL at 20:23

## 2023-01-01 RX ADMIN — Medication 12.5 MG: at 20:23

## 2023-01-01 RX ADMIN — TAMSULOSIN HYDROCHLORIDE 0.8 MG: 0.4 CAPSULE ORAL at 20:04

## 2023-01-01 RX ADMIN — ACETAMINOPHEN 1000 MG: 500 TABLET ORAL at 07:56

## 2023-01-01 RX ADMIN — RISPERIDONE 0.5 MG: 0.25 TABLET, FILM COATED ORAL at 08:58

## 2023-01-01 RX ADMIN — ALENDRONATE SODIUM 70 MG: 70 TABLET ORAL at 09:17

## 2023-01-01 RX ADMIN — VANCOMYCIN HYDROCHLORIDE 1000 MG: 1 INJECTION, SOLUTION INTRAVENOUS at 13:26

## 2023-01-01 RX ADMIN — CALCIUM 500 MG: 500 TABLET ORAL at 19:48

## 2023-01-01 RX ADMIN — MIRTAZAPINE 15 MG: 15 TABLET, FILM COATED ORAL at 21:35

## 2023-01-01 RX ADMIN — CALCIUM 500 MG: 500 TABLET ORAL at 08:48

## 2023-01-01 RX ADMIN — DOXYCYCLINE HYCLATE 100 MG: 100 CAPSULE ORAL at 08:19

## 2023-01-01 RX ADMIN — Medication 25 MCG: at 21:18

## 2023-01-01 RX ADMIN — DOXYCYCLINE HYCLATE 100 MG: 100 CAPSULE ORAL at 20:19

## 2023-01-01 RX ADMIN — ACETAMINOPHEN 1000 MG: 500 TABLET ORAL at 09:50

## 2023-01-01 RX ADMIN — RISPERIDONE 1 MG: 1 TABLET ORAL at 22:31

## 2023-01-01 RX ADMIN — Medication 12.5 MG: at 20:05

## 2023-01-01 RX ADMIN — Medication 25 MCG: at 10:00

## 2023-01-01 RX ADMIN — CALCIUM 500 MG: 500 TABLET ORAL at 20:23

## 2023-01-01 RX ADMIN — CYANOCOBALAMIN TAB 1000 MCG 2000 MCG: 1000 TAB at 08:15

## 2023-01-01 RX ADMIN — AMOXICILLIN AND CLAVULANATE POTASSIUM 1 TABLET: 500; 125 TABLET, FILM COATED ORAL at 13:57

## 2023-01-01 RX ADMIN — POTASSIUM CHLORIDE 40 MEQ: 750 TABLET, EXTENDED RELEASE ORAL at 18:05

## 2023-01-01 RX ADMIN — CALCIUM 500 MG: 500 TABLET ORAL at 22:39

## 2023-01-01 RX ADMIN — CALCIUM 500 MG: 500 TABLET ORAL at 21:18

## 2023-01-01 RX ADMIN — VANCOMYCIN HYDROCHLORIDE 750 MG: 1 INJECTION, POWDER, LYOPHILIZED, FOR SOLUTION INTRAVENOUS at 14:19

## 2023-01-01 RX ADMIN — CALCIUM 500 MG: 500 TABLET ORAL at 20:25

## 2023-01-01 RX ADMIN — TAMSULOSIN HYDROCHLORIDE 0.8 MG: 0.4 CAPSULE ORAL at 19:44

## 2023-01-01 RX ADMIN — Medication 25 MCG: at 19:22

## 2023-01-01 RX ADMIN — SERTRALINE HYDROCHLORIDE 200 MG: 100 TABLET ORAL at 20:03

## 2023-01-01 RX ADMIN — CYANOCOBALAMIN TAB 1000 MCG 2000 MCG: 1000 TAB at 10:00

## 2023-01-01 RX ADMIN — MIRTAZAPINE 15 MG: 15 TABLET, FILM COATED ORAL at 22:11

## 2023-01-01 RX ADMIN — WARFARIN SODIUM 5 MG: 5 TABLET ORAL at 18:07

## 2023-01-01 RX ADMIN — WARFARIN SODIUM 3.5 MG: 3 TABLET ORAL at 17:00

## 2023-01-01 RX ADMIN — ACETAMINOPHEN 1000 MG: 500 TABLET ORAL at 08:51

## 2023-01-01 RX ADMIN — AMOXICILLIN AND CLAVULANATE POTASSIUM 1 TABLET: 500; 125 TABLET, FILM COATED ORAL at 22:11

## 2023-01-01 RX ADMIN — AMOXICILLIN AND CLAVULANATE POTASSIUM 1 TABLET: 500; 125 TABLET, FILM COATED ORAL at 13:16

## 2023-01-01 RX ADMIN — CALCIUM 500 MG: 500 TABLET ORAL at 09:43

## 2023-01-01 RX ADMIN — RISPERIDONE 0.5 MG: 0.25 TABLET, FILM COATED ORAL at 08:03

## 2023-01-01 RX ADMIN — MIRTAZAPINE 15 MG: 15 TABLET, FILM COATED ORAL at 21:45

## 2023-01-01 RX ADMIN — RISPERIDONE 1 MG: 1 TABLET ORAL at 22:11

## 2023-01-01 RX ADMIN — ACETAMINOPHEN 1000 MG: 500 TABLET ORAL at 08:39

## 2023-01-01 RX ADMIN — Medication 25 MCG: at 20:04

## 2023-01-01 RX ADMIN — CYANOCOBALAMIN TAB 1000 MCG 2000 MCG: 1000 TAB at 07:57

## 2023-01-01 RX ADMIN — CALCIUM 500 MG: 500 TABLET ORAL at 08:19

## 2023-01-01 RX ADMIN — AMOXICILLIN AND CLAVULANATE POTASSIUM 1 TABLET: 500; 125 TABLET, FILM COATED ORAL at 13:53

## 2023-01-01 RX ADMIN — TAMSULOSIN HYDROCHLORIDE 0.8 MG: 0.4 CAPSULE ORAL at 21:06

## 2023-01-01 RX ADMIN — WARFARIN SODIUM 5 MG: 5 TABLET ORAL at 18:46

## 2023-01-01 RX ADMIN — AMOXICILLIN AND CLAVULANATE POTASSIUM 1 TABLET: 500; 125 TABLET, FILM COATED ORAL at 08:46

## 2023-01-01 RX ADMIN — AMOXICILLIN AND CLAVULANATE POTASSIUM 1 TABLET: 500; 125 TABLET, FILM COATED ORAL at 06:34

## 2023-01-01 RX ADMIN — RISPERIDONE 1 MG: 1 TABLET ORAL at 22:39

## 2023-01-01 RX ADMIN — RISPERIDONE 1 MG: 1 TABLET ORAL at 22:53

## 2023-01-01 RX ADMIN — AMOXICILLIN AND CLAVULANATE POTASSIUM 1 TABLET: 500; 125 TABLET, FILM COATED ORAL at 06:25

## 2023-01-01 RX ADMIN — AMOXICILLIN AND CLAVULANATE POTASSIUM 1 TABLET: 500; 125 TABLET, FILM COATED ORAL at 14:41

## 2023-01-01 RX ADMIN — PIPERACILLIN AND TAZOBACTAM 2.25 G: 2; .25 INJECTION, POWDER, FOR SOLUTION INTRAVENOUS at 09:44

## 2023-01-01 RX ADMIN — SERTRALINE HYDROCHLORIDE 200 MG: 100 TABLET ORAL at 21:06

## 2023-01-01 RX ADMIN — DOXYCYCLINE HYCLATE 100 MG: 100 CAPSULE ORAL at 08:39

## 2023-01-01 RX ADMIN — RISPERIDONE 1 MG: 1 TABLET ORAL at 22:25

## 2023-01-01 RX ADMIN — REMDESIVIR 100 MG: 100 INJECTION, POWDER, LYOPHILIZED, FOR SOLUTION INTRAVENOUS at 11:48

## 2023-01-01 RX ADMIN — WARFARIN SODIUM 5 MG: 5 TABLET ORAL at 17:16

## 2023-01-01 RX ADMIN — SODIUM CHLORIDE 2000 ML: 9 INJECTION, SOLUTION INTRAVENOUS at 10:20

## 2023-01-01 RX ADMIN — CALCIUM 500 MG: 500 TABLET ORAL at 09:51

## 2023-01-01 RX ADMIN — CALCIUM 500 MG: 500 TABLET ORAL at 08:51

## 2023-01-01 RX ADMIN — DOXYCYCLINE HYCLATE 100 MG: 100 CAPSULE ORAL at 08:59

## 2023-01-01 RX ADMIN — AMOXICILLIN AND CLAVULANATE POTASSIUM 1 TABLET: 500; 125 TABLET, FILM COATED ORAL at 21:17

## 2023-01-01 RX ADMIN — AMOXICILLIN AND CLAVULANATE POTASSIUM 1 TABLET: 500; 125 TABLET, FILM COATED ORAL at 13:29

## 2023-01-01 RX ADMIN — ACETAMINOPHEN 1000 MG: 500 TABLET ORAL at 21:06

## 2023-01-01 RX ADMIN — ACETAMINOPHEN 1000 MG: 500 TABLET ORAL at 09:42

## 2023-01-01 RX ADMIN — RISPERIDONE 1 MG: 1 TABLET ORAL at 21:35

## 2023-01-01 RX ADMIN — MIRTAZAPINE 15 MG: 15 TABLET, FILM COATED ORAL at 21:26

## 2023-01-01 RX ADMIN — VANCOMYCIN HYDROCHLORIDE 750 MG: 1 INJECTION, POWDER, LYOPHILIZED, FOR SOLUTION INTRAVENOUS at 13:42

## 2023-01-01 RX ADMIN — IOPAMIDOL 90 ML: 755 INJECTION, SOLUTION INTRAVASCULAR at 15:02

## 2023-01-01 RX ADMIN — PIPERACILLIN AND TAZOBACTAM 2.25 G: 2; .25 INJECTION, POWDER, FOR SOLUTION INTRAVENOUS at 07:56

## 2023-01-01 RX ADMIN — AMOXICILLIN AND CLAVULANATE POTASSIUM 1 TABLET: 500; 125 TABLET, FILM COATED ORAL at 20:06

## 2023-01-01 RX ADMIN — DOXYCYCLINE HYCLATE 100 MG: 100 CAPSULE ORAL at 08:51

## 2023-01-01 RX ADMIN — Medication 25 MCG: at 07:57

## 2023-01-01 RX ADMIN — PIPERACILLIN AND TAZOBACTAM 2.25 G: 2; .25 INJECTION, POWDER, FOR SOLUTION INTRAVENOUS at 20:05

## 2023-01-01 RX ADMIN — Medication 25 MCG: at 20:23

## 2023-01-01 RX ADMIN — RISPERIDONE 0.5 MG: 0.25 TABLET, FILM COATED ORAL at 08:38

## 2023-01-01 RX ADMIN — AMOXICILLIN AND CLAVULANATE POTASSIUM 1 TABLET: 500; 125 TABLET, FILM COATED ORAL at 22:39

## 2023-01-01 RX ADMIN — Medication 12.5 MG: at 08:51

## 2023-01-01 RX ADMIN — Medication 25 MCG: at 08:19

## 2023-01-01 RX ADMIN — SODIUM CHLORIDE 50 ML: 9 INJECTION, SOLUTION INTRAVENOUS at 16:16

## 2023-01-01 RX ADMIN — DOCUSATE SODIUM 50 MG AND SENNOSIDES 8.6 MG 1 TABLET: 8.6; 5 TABLET, FILM COATED ORAL at 18:38

## 2023-01-01 RX ADMIN — PIPERACILLIN AND TAZOBACTAM 2.25 G: 2; .25 INJECTION, POWDER, FOR SOLUTION INTRAVENOUS at 19:03

## 2023-01-01 RX ADMIN — SODIUM CHLORIDE, POTASSIUM CHLORIDE, SODIUM LACTATE AND CALCIUM CHLORIDE 250 ML: 600; 310; 30; 20 INJECTION, SOLUTION INTRAVENOUS at 13:38

## 2023-01-01 RX ADMIN — DOXYCYCLINE HYCLATE 100 MG: 100 CAPSULE ORAL at 10:00

## 2023-01-01 RX ADMIN — Medication 12.5 MG: at 09:50

## 2023-01-01 RX ADMIN — ACETAMINOPHEN 1000 MG: 500 TABLET ORAL at 20:40

## 2023-01-01 RX ADMIN — Medication 12.5 MG: at 20:19

## 2023-01-01 RX ADMIN — PIPERACILLIN AND TAZOBACTAM 2.25 G: 2; .25 INJECTION, POWDER, FOR SOLUTION INTRAVENOUS at 15:16

## 2023-01-01 RX ADMIN — AMOXICILLIN AND CLAVULANATE POTASSIUM 1 TABLET: 500; 125 TABLET, FILM COATED ORAL at 05:48

## 2023-01-01 RX ADMIN — SERTRALINE HYDROCHLORIDE 200 MG: 100 TABLET ORAL at 21:17

## 2023-01-01 RX ADMIN — Medication 25 MCG: at 08:18

## 2023-01-01 RX ADMIN — Medication 25 MCG: at 20:24

## 2023-01-01 RX ADMIN — SERTRALINE HYDROCHLORIDE 200 MG: 100 TABLET ORAL at 19:45

## 2023-01-01 RX ADMIN — Medication 25 MCG: at 08:59

## 2023-01-01 RX ADMIN — SERTRALINE HYDROCHLORIDE 200 MG: 100 TABLET ORAL at 20:19

## 2023-01-01 RX ADMIN — ACETAMINOPHEN 1000 MG: 500 TABLET ORAL at 19:22

## 2023-01-01 RX ADMIN — ACETAMINOPHEN 1000 MG: 500 TABLET ORAL at 08:15

## 2023-01-01 RX ADMIN — CYANOCOBALAMIN TAB 1000 MCG 2000 MCG: 1000 TAB at 08:46

## 2023-01-01 RX ADMIN — RISPERIDONE 0.5 MG: 0.25 TABLET, FILM COATED ORAL at 08:51

## 2023-01-01 RX ADMIN — ACETAMINOPHEN 1000 MG: 500 TABLET ORAL at 08:46

## 2023-01-01 ASSESSMENT — ACTIVITIES OF DAILY LIVING (ADL)
ADLS_ACUITY_SCORE: 56
ADLS_ACUITY_SCORE: 56
ADLS_ACUITY_SCORE: 61
ADLS_ACUITY_SCORE: 59
ADLS_ACUITY_SCORE: 58
ADLS_ACUITY_SCORE: 59
ADLS_ACUITY_SCORE: 59
ADLS_ACUITY_SCORE: 58
ADLS_ACUITY_SCORE: 59
ADLS_ACUITY_SCORE: 58
ADLS_ACUITY_SCORE: 58
ADLS_ACUITY_SCORE: 57
ADLS_ACUITY_SCORE: 59
ADLS_ACUITY_SCORE: 53
ADLS_ACUITY_SCORE: 61
ADLS_ACUITY_SCORE: 58
ADLS_ACUITY_SCORE: 59
ADLS_ACUITY_SCORE: 58
ADLS_ACUITY_SCORE: 58
ADLS_ACUITY_SCORE: 61
ADLS_ACUITY_SCORE: 58
ADLS_ACUITY_SCORE: 56
ADLS_ACUITY_SCORE: 56
ADLS_ACUITY_SCORE: 55
ADLS_ACUITY_SCORE: 53
ADLS_ACUITY_SCORE: 63
ADLS_ACUITY_SCORE: 59
ADLS_ACUITY_SCORE: 61
ADLS_ACUITY_SCORE: 59
ADLS_ACUITY_SCORE: 63
ADLS_ACUITY_SCORE: 59
ADLS_ACUITY_SCORE: 58
ADLS_ACUITY_SCORE: 56
ADLS_ACUITY_SCORE: 63
ADLS_ACUITY_SCORE: 59
ADLS_ACUITY_SCORE: 58
ADLS_ACUITY_SCORE: 56
ADLS_ACUITY_SCORE: 61
ADLS_ACUITY_SCORE: 61
ADLS_ACUITY_SCORE: 56
ADLS_ACUITY_SCORE: 58
ADLS_ACUITY_SCORE: 58
ADLS_ACUITY_SCORE: 56
ADLS_ACUITY_SCORE: 55
ADLS_ACUITY_SCORE: 63
ADLS_ACUITY_SCORE: 56
ADLS_ACUITY_SCORE: 61
ADLS_ACUITY_SCORE: 37
ADLS_ACUITY_SCORE: 56
ADLS_ACUITY_SCORE: 61
ADLS_ACUITY_SCORE: 59
ADLS_ACUITY_SCORE: 59
ADLS_ACUITY_SCORE: 55
ADLS_ACUITY_SCORE: 61
ADLS_ACUITY_SCORE: 61
ADLS_ACUITY_SCORE: 58
ADLS_ACUITY_SCORE: 55
ADLS_ACUITY_SCORE: 65
ADLS_ACUITY_SCORE: 56
ADLS_ACUITY_SCORE: 37
ADLS_ACUITY_SCORE: 59
ADLS_ACUITY_SCORE: 59
ADLS_ACUITY_SCORE: 61
ADLS_ACUITY_SCORE: 58
ADLS_ACUITY_SCORE: 61
ADLS_ACUITY_SCORE: 37
ADLS_ACUITY_SCORE: 58
ADLS_ACUITY_SCORE: 61
ADLS_ACUITY_SCORE: 58
ADLS_ACUITY_SCORE: 63
ADLS_ACUITY_SCORE: 59
ADLS_ACUITY_SCORE: 58
ADLS_ACUITY_SCORE: 56
ADLS_ACUITY_SCORE: 53
ADLS_ACUITY_SCORE: 59
ADLS_ACUITY_SCORE: 58
ADLS_ACUITY_SCORE: 57
ADLS_ACUITY_SCORE: 58
ADLS_ACUITY_SCORE: 65
ADLS_ACUITY_SCORE: 56
ADLS_ACUITY_SCORE: 58
ADLS_ACUITY_SCORE: 53
ADLS_ACUITY_SCORE: 58
ADLS_ACUITY_SCORE: 56
ADLS_ACUITY_SCORE: 61
ADLS_ACUITY_SCORE: 58
ADLS_ACUITY_SCORE: 56
ADLS_ACUITY_SCORE: 61
ADLS_ACUITY_SCORE: 37
ADLS_ACUITY_SCORE: 56
ADLS_ACUITY_SCORE: 65
ADLS_ACUITY_SCORE: 58
ADLS_ACUITY_SCORE: 53
ADLS_ACUITY_SCORE: 58
ADLS_ACUITY_SCORE: 61
ADLS_ACUITY_SCORE: 59
ADLS_ACUITY_SCORE: 59
ADLS_ACUITY_SCORE: 56
ADLS_ACUITY_SCORE: 61
ADLS_ACUITY_SCORE: 58
ADLS_ACUITY_SCORE: 56
ADLS_ACUITY_SCORE: 59
ADLS_ACUITY_SCORE: 56
ADLS_ACUITY_SCORE: 57
ADLS_ACUITY_SCORE: 59
ADLS_ACUITY_SCORE: 61
ADLS_ACUITY_SCORE: 56
ADLS_ACUITY_SCORE: 56
ADLS_ACUITY_SCORE: 59
ADLS_ACUITY_SCORE: 61
ADLS_ACUITY_SCORE: 56
ADLS_ACUITY_SCORE: 65
ADLS_ACUITY_SCORE: 59
ADLS_ACUITY_SCORE: 58
ADLS_ACUITY_SCORE: 61
IN_THE_PAST_7_DAYS,_DID_YOU_NEED_HELP_FROM_OTHERS_TO_TAKE_CARE_OF_THINGS_SUCH_AS_LAUNDRY_AND_HOUSEKEEPING,_BANKING,_SHOPPING,_USING_THE_TELEPHONE,_FOOD_PREPARATION,_TRANSPORTATION,_OR_TAKING_YOUR_OWN_MEDICATIONS?: Y
DEPENDENT_IADLS:: CLEANING;COOKING;LAUNDRY;SHOPPING;MEAL PREPARATION;MEDICATION MANAGEMENT;MONEY MANAGEMENT;TRANSPORTATION;INCONTINENCE
ADLS_ACUITY_SCORE: 55
ADLS_ACUITY_SCORE: 58
ADLS_ACUITY_SCORE: 56
ADLS_ACUITY_SCORE: 58
ADLS_ACUITY_SCORE: 57
ADLS_ACUITY_SCORE: 57
ADLS_ACUITY_SCORE: 35
ADLS_ACUITY_SCORE: 56
ADLS_ACUITY_SCORE: 58
ADLS_ACUITY_SCORE: 65
ADLS_ACUITY_SCORE: 56
ADLS_ACUITY_SCORE: 49
ADLS_ACUITY_SCORE: 63
ADLS_ACUITY_SCORE: 56
ADLS_ACUITY_SCORE: 37
ADLS_ACUITY_SCORE: 56
ADLS_ACUITY_SCORE: 37
ADLS_ACUITY_SCORE: 56
ADLS_ACUITY_SCORE: 65
ADLS_ACUITY_SCORE: 65
ADLS_ACUITY_SCORE: 59
ADLS_ACUITY_SCORE: 61
ADLS_ACUITY_SCORE: 65
ADLS_ACUITY_SCORE: 59
ADLS_ACUITY_SCORE: 63
ADLS_ACUITY_SCORE: 57
ADLS_ACUITY_SCORE: 63
ADLS_ACUITY_SCORE: 56
ADLS_ACUITY_SCORE: 59
ADLS_ACUITY_SCORE: 58
IN_THE_PAST_7_DAYS,_DID_YOU_NEED_HELP_FROM_OTHERS_TO_PERFORM_EVERYDAY_ACTIVITIES_SUCH_AS_EATING,_GETTING_DRESSED,_GROOMING,_BATHING,_WALKING,_OR_USING_THE_TOILET: Y
ADLS_ACUITY_SCORE: 58
ADLS_ACUITY_SCORE: 59
ADLS_ACUITY_SCORE: 56
ADLS_ACUITY_SCORE: 37
ADLS_ACUITY_SCORE: 58
ADLS_ACUITY_SCORE: 65
ADLS_ACUITY_SCORE: 61
ADLS_ACUITY_SCORE: 56

## 2023-01-01 ASSESSMENT — TONOMETRY
IOP_METHOD: TONOPEN
OS_IOP_MMHG: 18
OD_IOP_MMHG: 17

## 2023-01-01 ASSESSMENT — ENCOUNTER SYMPTOMS
JOINT SWELLING: 0
NUMBNESS: 0
LOSS OF CONSCIOUSNESS: 0
INSOMNIA: 1
MYALGIAS: 1
PANIC: 0
SEIZURES: 0
SKIN CHANGES: 0
DECREASED CONCENTRATION: 1
TREMORS: 0
MUSCLE CRAMPS: 1
DISTURBANCES IN COORDINATION: 1
MUSCLE WEAKNESS: 1
NECK PAIN: 0
STIFFNESS: 1
TINGLING: 0
DYSURIA: 0
MEMORY LOSS: 1
SPEECH CHANGE: 1
HEMATURIA: 0
NERVOUS/ANXIOUS: 1
DEPRESSION: 1
FLANK PAIN: 1
WEAKNESS: 1
HEADACHES: 0
DIFFICULTY URINATING: 1
ARTHRALGIAS: 0
DIZZINESS: 1
BACK PAIN: 1
POOR WOUND HEALING: 0
NAIL CHANGES: 0
PARALYSIS: 0

## 2023-01-01 ASSESSMENT — EXTERNAL EXAM - RIGHT EYE: OD_EXAM: NORMAL

## 2023-01-01 ASSESSMENT — VISUAL ACUITY
OD_SC+: -2
OS_SC+: -2
METHOD: SNELLEN - LINEAR
OS_SC: 20/20
OD_SC: 20/30SLOW

## 2023-01-01 ASSESSMENT — REFRACTION_MANIFEST
OS_CYLINDER: SPHERE
OS_SPHERE: PLANO
OD_AXIS: 005
OD_SPHERE: +0.25
OS_ADD: +2.50
OD_CYLINDER: +0.75
OD_ADD: +2.50

## 2023-01-01 ASSESSMENT — CUP TO DISC RATIO
OD_RATIO: 0.6
OS_RATIO: 0.4

## 2023-01-01 ASSESSMENT — SLIT LAMP EXAM - LIDS
COMMENTS: NORMAL
COMMENTS: NORMAL

## 2023-01-01 ASSESSMENT — PAIN SCALES - GENERAL: PAINLEVEL: NO PAIN (0)

## 2023-01-01 ASSESSMENT — EXTERNAL EXAM - LEFT EYE: OS_EXAM: NORMAL

## 2023-01-02 ENCOUNTER — ANTICOAGULATION THERAPY VISIT (OUTPATIENT)
Dept: ANTICOAGULATION | Facility: CLINIC | Age: 88
End: 2023-01-02

## 2023-01-02 DIAGNOSIS — I48.91 ATRIAL FIBRILLATION, UNSPECIFIED TYPE (H): Primary | ICD-10-CM

## 2023-01-02 DIAGNOSIS — Z79.01 LONG TERM CURRENT USE OF ANTICOAGULANT THERAPY: ICD-10-CM

## 2023-01-02 DIAGNOSIS — I82.409 DEEP VEIN THROMBOSIS (DVT) (H): ICD-10-CM

## 2023-01-02 LAB — INR (EXTERNAL): 2.9 (ref 0.9–1.1)

## 2023-01-02 NOTE — PROGRESS NOTES
ANTICOAGULATION MANAGEMENT     Noe Florence 87 year old male is on warfarin with supratherapeutic INR result. (Goal INR 1.5 - 2.5)    Recent labs: (last 7 days)     01/02/23  0000   INR 2.9*       ASSESSMENT       Source(s): Chart Review, Patient/Caregiver Call and Home Care/Facility Nurse       Warfarin doses taken: Warfarin taken as instructed    Diet: Sha was discharged from a TCU on 12/28/22.  Rica states he ate really well there, is adjusting to his home diet    New illness, injury, or hospitalization: Yes: TCU 11/15/22 - 12/28/22 for recurrent falls    Medication/supplement changes: ariprazole was discontinued and risperdal started;  started on scheduled tylenol 12/1/22--taking between 1,000 and 2000 mg/day    Signs or symptoms of bleeding or clotting: No    Previous INR: Therapeutic last visit; previously outside of goal range    Additional findings: None       PLAN     Recommended plan for ongoing change(s) affecting INR     Dosing Instructions: partial hold then decrease your maintenance dose by 9% and recheck INR in one week.    Summary  As of 1/2/2023    Full warfarin instructions:  1/2: 2.5 mg; Otherwise 5 mg every Mon, Wed, Fri; 2.5 mg all other days   Next INR check:  1/9/2023             Telephone call with [facility] home care nurse who agrees to plan and repeated back plan correctly  Also spoke with Rica  Orders given to  Homecare nurse/facility to recheck    Education provided:     Contact [phone] with any changes, questions or concerns.     Plan made with [facility] Pharmacist Lizett Edwards, RN  Anticoagulation Clinic  1/2/2023    _______________________________________________________________________     Anticoagulation Episode Summary     Current INR goal:  Other - see comment   TTR:  55.6 % (1 y)   Target end date:  Indefinite   Send INR reminders to:  [facility]    Indications    Atrial fibrillation (H) [I48.91] [I48.91]  Long-term (current) use of anticoagulants  [Z79.01] [Z79.01]  Deep vein thrombosis (DVT) (H) [I82.409]  Atrial fibrillation  unspecified type (H) [I48.91]           Comments:  Goal range: 1.5-2.5         Anticoagulation Care Providers     Provider Role Specialty Phone number    Frida Desai MD Referring Cardiovascular Disease [phone]    Roberto Sarmiento MD Referring Internal Medicine [phone]

## 2023-01-09 ENCOUNTER — DOCUMENTATION ONLY (OUTPATIENT)
Dept: INTERNAL MEDICINE | Facility: CLINIC | Age: 88
End: 2023-01-09

## 2023-01-09 NOTE — PROGRESS NOTES
Type of Form Received: [facility]    Form Received (Date) 1/9/23   Form Filled out No   Placed in provider folder Yes

## 2023-01-10 ENCOUNTER — MEDICAL CORRESPONDENCE (OUTPATIENT)
Dept: HEALTH INFORMATION MANAGEMENT | Facility: CLINIC | Age: 88
End: 2023-01-10

## 2023-01-10 ENCOUNTER — ANTICOAGULATION THERAPY VISIT (OUTPATIENT)
Dept: ANTICOAGULATION | Facility: CLINIC | Age: 88
End: 2023-01-10

## 2023-01-10 DIAGNOSIS — I82.409 DEEP VEIN THROMBOSIS (DVT) (H): ICD-10-CM

## 2023-01-10 DIAGNOSIS — I48.91 ATRIAL FIBRILLATION, UNSPECIFIED TYPE (H): Primary | ICD-10-CM

## 2023-01-10 DIAGNOSIS — Z79.01 LONG TERM CURRENT USE OF ANTICOAGULANT THERAPY: ICD-10-CM

## 2023-01-10 LAB — INR (EXTERNAL): 2 (ref 0.9–1.1)

## 2023-01-10 NOTE — PROGRESS NOTES
ANTICOAGULATION MANAGEMENT     Noe Florence 87 year old male is on warfarin with therapeutic INR result. (Goal INR 1.5-2.5)    Recent labs: (last 7 days)     01/10/23  1452   INR 2.0*       ASSESSMENT       Source(s): Chart Review and Home Care/Facility Nurse       Warfarin doses taken: Warfarin taken as instructed and Template incorrect; verbally confirmed home dose with Home care nurse     Diet: No new diet changes identified    New illness, injury, or hospitalization: No    Medication/supplement changes: None noted    Signs or symptoms of bleeding or clotting: No    Previous INR: Supratherapeutic    Additional findings: None       PLAN     Recommended plan for no diet, medication or health factor changes affecting INR     Dosing Instructions: Continue your current warfarin dose with next INR in 1 week       Summary  As of 1/10/2023    Full warfarin instructions:  2.5 mg every Sun, Tue, Thu; 5 mg all other days   Next INR check:  1/17/2023             Telephone call with [facility] home care nurse who verbalizes understanding and agrees to plan and who agrees to plan and repeated back plan correctly    Orders given to  Homecare nurse/facility to recheck    Education provided:     Symptom monitoring: monitoring for bleeding signs and symptoms and monitoring for clotting signs and symptoms    Importance of notifying anticoagulation clinic for: changes in medications; a sooner lab recheck maybe needed and diarrhea, nausea/vomiting, reduced intake, cold/flu, and/or infections; a sooner lab recheck maybe needed    Plan made per [facility] anticoagulation protocol    Ciro Bajwa, RN  Anticoagulation Clinic  1/10/2023    _______________________________________________________________________     Anticoagulation Episode Summary     Current INR goal:  Other - see comment   TTR:  55.6 % (1 y)   Target end date:  Indefinite   Send INR reminders to:  [facility]    Indications    Atrial fibrillation (H) [I48.91]  [I48.91]  Long-term (current) use of anticoagulants [Z79.01] [Z79.01]  Deep vein thrombosis (DVT) (H) [I82.409]  Atrial fibrillation  unspecified type (H) [I48.91]           Comments:  Goal range: 1.5-2.5         Anticoagulation Care Providers     Provider Role Specialty Phone number    Frida Desai MD Referring Cardiovascular Disease [phone]    Roberto Sarmiento MD Referring Internal Medicine [phone]

## 2023-01-13 ENCOUNTER — DOCUMENTATION ONLY (OUTPATIENT)
Dept: INTERNAL MEDICINE | Facility: CLINIC | Age: 88
End: 2023-01-13

## 2023-01-13 NOTE — PROGRESS NOTES
Type of Form Received: REVISION TO PLAN OF CARE    Form Received (Date) 1/13/23   Form Filled out No   Placed in provider folder Yes

## 2023-01-18 ENCOUNTER — DOCUMENTATION ONLY (OUTPATIENT)
Dept: INTERNAL MEDICINE | Facility: CLINIC | Age: 88
End: 2023-01-18
Payer: COMMERCIAL

## 2023-01-18 NOTE — PROGRESS NOTES
Type of Form Received: order    Form Received (Date) 1/18/23   Form Filled out Yes 2/1/23   Placed in provider folder Yes

## 2023-01-19 ENCOUNTER — ANTICOAGULATION THERAPY VISIT (OUTPATIENT)
Dept: ANTICOAGULATION | Facility: CLINIC | Age: 88
End: 2023-01-19
Payer: COMMERCIAL

## 2023-01-19 DIAGNOSIS — I82.409 DEEP VEIN THROMBOSIS (DVT) (H): ICD-10-CM

## 2023-01-19 DIAGNOSIS — Z79.01 LONG TERM CURRENT USE OF ANTICOAGULANT THERAPY: ICD-10-CM

## 2023-01-19 DIAGNOSIS — I48.91 ATRIAL FIBRILLATION, UNSPECIFIED TYPE (H): Primary | ICD-10-CM

## 2023-01-19 LAB — INR (EXTERNAL): 1.9 (ref 0.9–1.1)

## 2023-01-19 NOTE — PROGRESS NOTES
ANTICOAGULATION MANAGEMENT     Noe Florence 87 year old male is on warfarin with therapeutic INR result. (Goal INR 1.5 - 2.5)    Recent labs: (last 7 days)     01/19/23  0000   INR 1.9*       ASSESSMENT       Source(s): Chart Review, Patient/Caregiver Call and Home Care/Facility Nurse       Warfarin doses taken: Rica reports Sha has taken warfarin 2.5 mg TuThSaSu and 5 mg MWF    Diet: No new diet changes identified    New illness, injury, or hospitalization: No    Medication/supplement changes: None noted    Signs or symptoms of bleeding or clotting: No    Previous INR: Therapeutic last visit; previously outside of goal range    Additional findings: None       PLAN     Recommended plan for no diet, medication or health factor changes affecting INR     Dosing Instructions: Continue your current warfarin dose with next INR in 1 week.  Maintenance dose adjusted to dose Rica reported Sha has been taking.       Summary  As of 1/19/2023    Full warfarin instructions:  5 mg every Mon, Wed, Fri; 2.5 mg all other days   Next INR check:  1/26/2023             Telephone call with [facility] home care nurse who agrees to plan and repeated back plan correctly.  Rica was also on call.    Orders given to  Homecare nurse/facility to recheck    Education provided:     Contact [phone] with any changes, questions or concerns.     Plan made per [facility] anticoagulation protocol    Danya Edwards RN  Anticoagulation Clinic  1/19/2023    _______________________________________________________________________     Anticoagulation Episode Summary     Current INR goal:  Other - see comment   TTR:  53.2 % (1 y)   Target end date:  Indefinite   Send INR reminders to:  [facility]    Indications    Atrial fibrillation (H) [I48.91] [I48.91]  Long-term (current) use of anticoagulants [Z79.01] [Z79.01]  Deep vein thrombosis (DVT) (H) [I82.409]  Atrial fibrillation  unspecified type (H) [I48.91]           Comments:  Goal range:  1.5-2.5         Anticoagulation Care Providers     Provider Role Specialty Phone number    Frida Desai MD Referring Cardiovascular Disease [phone]    Roberto Sarmiento MD Referring Internal Medicine [phone]

## 2023-01-23 ENCOUNTER — DOCUMENTATION ONLY (OUTPATIENT)
Dept: INTERNAL MEDICINE | Facility: CLINIC | Age: 88
End: 2023-01-23
Payer: COMMERCIAL

## 2023-01-23 NOTE — PROGRESS NOTES
Type of Form Received: order    Form Received (Date) 1/23/23   Form Filled out yes   Placed in provider folder Yes

## 2023-01-23 NOTE — PROGRESS NOTES
Type of Form Received: order    Form Received (Date) 1/23/23   Form Filled out Yes 2/1/23   Placed in provider folder Yes

## 2023-01-24 ENCOUNTER — DOCUMENTATION ONLY (OUTPATIENT)
Dept: INTERNAL MEDICINE | Facility: CLINIC | Age: 88
End: 2023-01-24
Payer: COMMERCIAL

## 2023-01-24 NOTE — PROGRESS NOTES
Type of Form Received: orders    Form Received (Date) 1/24/23   Form Filled out Yes 2/1/23   Placed in provider folder Yes

## 2023-01-26 ENCOUNTER — DOCUMENTATION ONLY (OUTPATIENT)
Dept: INTERNAL MEDICINE | Facility: CLINIC | Age: 88
End: 2023-01-26
Payer: COMMERCIAL

## 2023-01-26 ENCOUNTER — ANTICOAGULATION THERAPY VISIT (OUTPATIENT)
Dept: ANTICOAGULATION | Facility: CLINIC | Age: 88
End: 2023-01-26
Payer: COMMERCIAL

## 2023-01-26 DIAGNOSIS — I82.409 DEEP VEIN THROMBOSIS (DVT) (H): ICD-10-CM

## 2023-01-26 DIAGNOSIS — I48.91 ATRIAL FIBRILLATION, UNSPECIFIED TYPE (H): Primary | ICD-10-CM

## 2023-01-26 DIAGNOSIS — Z79.01 LONG TERM CURRENT USE OF ANTICOAGULANT THERAPY: ICD-10-CM

## 2023-01-26 LAB — INR (EXTERNAL): 2.3 (ref 0.9–1.1)

## 2023-01-26 NOTE — PROGRESS NOTES
Type of Form Received: order    Form Received (Date) 1/26/23   Form Filled out Yes 2/1/23   Placed in provider folder Yes

## 2023-01-26 NOTE — PROGRESS NOTES
ANTICOAGULATION MANAGEMENT     Noe Florence 87 year old male is on warfarin with therapeutic INR result. (Goal INR 1.5 - 2.5)    Recent labs: (last 7 days)     01/26/23  0000   INR 2.3*       ASSESSMENT       Source(s): Chart Review and Home Care/Facility Nurse       Warfarin doses taken: Warfarin taken as instructed    Diet: No new diet changes identified    New illness, injury, or hospitalization: No    Medication/supplement changes: None noted    Signs or symptoms of bleeding or clotting: No    Previous INR: Therapeutic last 2(+) visits    Additional findings: None       PLAN     Recommended plan for no diet, medication or health factor changes affecting INR     Dosing Instructions: Continue your current warfarin dose with next INR in 1 week       Summary  As of 1/26/2023    Full warfarin instructions:  5 mg every Mon, Wed, Fri; 2.5 mg all other days   Next INR check:  2/2/2023             Telephone call with \[facility]\"" home care nurse who agrees to plan and repeated back plan correctly    Orders given to  Homecare nurse/facility to recheck    Education provided:     Contact [phone] with any changes, questions or concerns.     Plan made per [facility] anticoagulation protocol    Danya Edwards RN  Anticoagulation Clinic  1/26/2023    _______________________________________________________________________     Anticoagulation Episode Summary     Current INR goal:  Other - see comment   TTR:  52.8 % (1 y)   Target end date:  Indefinite   Send INR reminders to:  [facility]    Indications    Atrial fibrillation (H) [I48.91] [I48.91]  Long-term (current) use of anticoagulants [Z79.01] [Z79.01]  Deep vein thrombosis (DVT) (H) [I82.409]  Atrial fibrillation  unspecified type (H) [I48.91]           Comments:  Goal range: 1.5-2.5         Anticoagulation Care Providers     Provider Role Specialty Phone number    Frida Desai MD Referring Cardiovascular Disease [phone]    Roberto Sarmiento MD  [facility] Internal Medicine [phone]

## 2023-01-27 ENCOUNTER — DOCUMENTATION ONLY (OUTPATIENT)
Dept: INTERNAL MEDICINE | Facility: CLINIC | Age: 88
End: 2023-01-27
Payer: COMMERCIAL

## 2023-01-27 NOTE — PROGRESS NOTES
Type of Form Received: [facility]    Form Received (Date) 1/27/23   Form Filled out yes   Placed in provider folder Yes

## 2023-01-30 ENCOUNTER — DOCUMENTATION ONLY (OUTPATIENT)
Dept: INTERNAL MEDICINE | Facility: CLINIC | Age: 88
End: 2023-01-30
Payer: COMMERCIAL

## 2023-01-30 NOTE — PROGRESS NOTES
Type of Form Received: order    Form Received (Date) 1/30/23   Form Filled out Yes 2/27/23   Placed in provider folder Yes

## 2023-01-31 ENCOUNTER — MEDICAL CORRESPONDENCE (OUTPATIENT)
Dept: HEALTH INFORMATION MANAGEMENT | Facility: CLINIC | Age: 88
End: 2023-01-31
Payer: COMMERCIAL

## 2023-01-31 DIAGNOSIS — Z53.9 DIAGNOSIS NOT YET DEFINED: Primary | ICD-10-CM

## 2023-01-31 PROCEDURE — G0180 MD CERTIFICATION HHA PATIENT: HCPCS | Performed by: INTERNAL MEDICINE

## 2023-02-02 ENCOUNTER — ANTICOAGULATION THERAPY VISIT (OUTPATIENT)
Dept: ANTICOAGULATION | Facility: CLINIC | Age: 88
End: 2023-02-02
Payer: COMMERCIAL

## 2023-02-02 ENCOUNTER — DOCUMENTATION ONLY (OUTPATIENT)
Dept: INTERNAL MEDICINE | Facility: CLINIC | Age: 88
End: 2023-02-02
Payer: COMMERCIAL

## 2023-02-02 DIAGNOSIS — I48.91 ATRIAL FIBRILLATION, UNSPECIFIED TYPE (H): Primary | ICD-10-CM

## 2023-02-02 DIAGNOSIS — Z79.01 LONG TERM CURRENT USE OF ANTICOAGULANT THERAPY: ICD-10-CM

## 2023-02-02 DIAGNOSIS — I82.409 DEEP VEIN THROMBOSIS (DVT) (H): ICD-10-CM

## 2023-02-02 LAB — INR (EXTERNAL): 3 (ref 0.9–1.1)

## 2023-02-02 NOTE — PROGRESS NOTES
Occupational Therapy Daily Treatment Note     Date: 9/17/2020  Name: Annika Mac  Clinic Number: [id number]    Therapy Diagnosis:   Encounter Diagnoses   Name Primary?    Pain in both hands     Decreased range of motion of left thumb       Physician: Marky Hagen MD    Physician Orders: Eval & treat  Medical Diagnosis: B Hand Pain; CMC arthritis & De Quervain's  Surgical Procedure and Date: none  Evaluation Date: 07/09/20  Insurance Authorization Period Expiration: 12/31/20  Plan of Care Certification Period: 07/09/20 to 08/19/20  Date of Return to MD: TBD    Visit # / Visits authorized: 16 / 20     Time In: 11:45 am  Time Out:  12:40 pm   Total Billable Time:  55 minutes (P, 1MT, 2TE)    Precautions:  Standard and Diabetes      Subjective     Pt reports: patient reports no discomfort with custom orthosis since adjusted last visit.   Response to previous treatment: less pain in her L hand/thumb  Functional change: able to use both wrist/hands in light self-care tasks, but with pain    Pain: R=5/10;  L=4/10 pre-tx   Location: bilateral wrists/hands      Objective     Annika received supervised modality after being cleared for contraindications, for 10 minutes, as follows:  -Paraffin w/ MHP to B wrist/hands, pre-tx to decrease pain & increase tissue extensibility    Annika received the following manual therapy techniques for 10 minutes:   -Edema mgmt w/ lymphatic drainage technique;  Deep STM, including MFR, CFM, TPR & scar mgmt to B forearm/wrist/hands, using hand techniques and IASTM tools to increase blood flow/circulation, improve soft tissue pliability and decrease pain.    Annika received therapeutic exercises for 35 minutes direct care including:  Exercises B wrist/hand   Dexterciser  3 min   Isospheres 3 min   9 Peg  2 trials each hand   PHG 3/10 reps, setting 2   T-putty: Yellow/green   -grasp/manipulation 3 reps each, 5 sec hold   -dowel digs 3 min each   -log rolls w/ tripod pinch 1 logs       Home Exercises  Type of Form Received: order    Form Received (Date) 2/2/23   Form Filled out Yes 2/27/23   Placed in provider folder Yes      and Education Provided     Education provided:   - Upgraded HEP with T-putty for strengthening, see Patient Instructions for details; patient was provided with a supply of yellow/green T-putty mixture for home use  -Progress towards goals    Written Home Exercises Provided: Patient instructed to cont prior HEP.  Exercises were reviewed and Annika was able to demonstrate them prior to the end of the session.  Annika demonstrated good  understanding of the HEP provided.     See EMR under Patient Instructions for exercises provided prior visit.        Assessment     Patient noted to have less pain in L wrist/thumb possibly due to L CMC orthosis, however, patient continues to have no change in the pain in her R wrist/hand. Patient was able to perform all therapeutic exercises listed above without any difficulty or increase in pain.         Annika is progressing well towards her goals and there are no updates to goals at this time. Pt prognosis is Good.     Pt will continue to benefit from skilled outpatient occupational therapy to address the deficits listed in the problem list on initial evaluation provide pt/family education and to maximize pt's level of independence in the home and community environment.     Anticipated barriers to occupational therapy: Arthritis, Diabetes    Pt's spiritual, cultural and educational needs considered and pt agreeable to plan of care and goals.    Goals:  Short Term Goals: (in 2 weeks)  1) Patient will be independent in HEP-met  2) Decrease pain in B hands/thumbs to no more than 6-7/10 worst in ADL/IADL's-met  3) Increase AROM in L thumb Rad/Palm ABD by 3-5 degrees for improved functioning in ADL/IADL's-previously met  4) Increase B  strength by 3-5 psi for increased functional use-met  5) Decrease edema in L wrist/thumb to trace-met      Long Term Goals: (in 6 weeks)  1) Decrease pain in B hands/thumbs to no more than 2-3/10 worst in ADL/IADL's-part met/ongoing  2) Increase AROM of B  hands/thumbs to WFL for increased functioning in ADL/IADL's-met  3) Increase strength in B  by 20% of initial measures for improved functioning in ADL/IADL's-ongoing  4) Increased functioning in ADL/IADL's, as evidenced by a FOTO impairment rating of no more than 43%-ongoing  5) Decrease edema in B wrist/hand to trace or none-met    Plan     Updated Certification Period:  08/27/20 to 09/23/20     Recommended Treatment Plan: 2 times per week for 4 weeks: Manual Therapy, Moist Heat/ Ice, Paraffin, Patient Education, Self Care, Therapeutic Activites, Therapeutic Exercise and Ultrasound      Other Recommendations: progress strengthening, as tolerated       Pratik Veloz, OT

## 2023-02-02 NOTE — PROGRESS NOTES
ANTICOAGULATION MANAGEMENT     Noe Florence 87 year old male is on warfarin with supratherapeutic INR result. (Goal INR Other - see comment)    Recent labs: (last 7 days)     02/02/23  0000   INR 3.0*       ASSESSMENT       Source(s): Chart Review, Patient/Caregiver Call and Home Care/Facility Nurse       Warfarin doses taken: Warfarin taken as instructed    Diet: No new diet changes identified    New illness, injury, or hospitalization: No    Medication/supplement changes: None noted    Signs or symptoms of bleeding or clotting: No    Previous INR: Therapeutic last 2(+) visits    Additional findings: None       PLAN     Recommended plan for no diet, medication or health factor changes affecting INR     Dosing Instructions: decrease your warfarin dose (10% change) with next INR in 1 week       Summary  As of 2/2/2023    Full warfarin instructions:  5 mg every Sun, Wed; 2.5 mg all other days   Next INR check:  2/9/2023             Telephone call with [facility] home care nurse who verbalizes understanding and agrees to plan and who agrees to plan and repeated back plan correctly    Orders given to  Homecare nurse/facility to recheck    Education provided:     None required    Plan made per [facility] anticoagulation protocol    Roya Manning, RN  Anticoagulation Clinic  2/2/2023    _______________________________________________________________________     Anticoagulation Episode Summary     Current INR goal:  Other - see comment   TTR:  52.8 % (1 y)   Target end date:  Indefinite   Send INR reminders to:  [facility]    Indications    Atrial fibrillation (H) [I48.91] [I48.91]  Long-term (current) use of anticoagulants [Z79.01] [Z79.01]  Deep vein thrombosis (DVT) (H) [I82.409]  Atrial fibrillation  unspecified type (H) [I48.91]           Comments:  Goal range: 1.5-2.5         Anticoagulation Care Providers     Provider Role Specialty Phone number    Frida Desai MD Referring Cardiovascular Disease [phone]     Roberto Sarmiento MD [facility] Internal Medicine [phone]

## 2023-02-07 ENCOUNTER — ANCILLARY PROCEDURE (OUTPATIENT)
Dept: CARDIOLOGY | Facility: CLINIC | Age: 88
End: 2023-02-07
Attending: INTERNAL MEDICINE
Payer: COMMERCIAL

## 2023-02-07 DIAGNOSIS — I48.0 PAROXYSMAL ATRIAL FIBRILLATION (H): ICD-10-CM

## 2023-02-07 DIAGNOSIS — Z95.810 ICD (IMPLANTABLE CARDIOVERTER-DEFIBRILLATOR) IN PLACE: ICD-10-CM

## 2023-02-07 DIAGNOSIS — I47.20 VENTRICULAR TACHYARRHYTHMIA (H): ICD-10-CM

## 2023-02-07 PROCEDURE — 93296 REM INTERROG EVL PM/IDS: CPT

## 2023-02-07 PROCEDURE — 93295 DEV INTERROG REMOTE 1/2/MLT: CPT | Performed by: INTERNAL MEDICINE

## 2023-02-09 ENCOUNTER — ANTICOAGULATION THERAPY VISIT (OUTPATIENT)
Dept: ANTICOAGULATION | Facility: CLINIC | Age: 88
End: 2023-02-09
Payer: COMMERCIAL

## 2023-02-09 DIAGNOSIS — Z79.01 LONG TERM CURRENT USE OF ANTICOAGULANT THERAPY: ICD-10-CM

## 2023-02-09 DIAGNOSIS — I82.409 DEEP VEIN THROMBOSIS (DVT) (H): ICD-10-CM

## 2023-02-09 DIAGNOSIS — I48.91 ATRIAL FIBRILLATION, UNSPECIFIED TYPE (H): Primary | ICD-10-CM

## 2023-02-09 LAB — INR (EXTERNAL): 2.2 (ref 0.9–1.1)

## 2023-02-09 NOTE — PROGRESS NOTES
ANTICOAGULATION MANAGEMENT     Noe Florence 87 year old male is on warfarin with therapeutic INR result. (Goal INR Other - see comment)    Recent labs: (last 7 days)     02/09/23  1453   INR 2.2*       ASSESSMENT       Source(s): Chart Review and Home Care/Facility Nurse       Warfarin doses taken: Warfarin taken as instructed    Diet: No new diet changes identified    New illness, injury, or hospitalization: No    Medication/supplement changes: None noted    Signs or symptoms of bleeding or clotting: No    Previous INR: Supratherapeutic    Additional findings: None       PLAN     Recommended plan for no diet, medication or health factor changes affecting INR     Dosing Instructions: Continue your current warfarin dose with next INR in 1 week       Summary  As of 2/9/2023    Full warfarin instructions:  5 mg every Sun, Wed; 2.5 mg all other days   Next INR check:  2/16/2023             Telephone call with [facility] home care nurse who verbalizes understanding and agrees to plan and who agrees to plan and repeated back plan correctly    Orders given to  Homecare nurse/facility to recheck    Education provided:     Symptom monitoring: monitoring for bleeding signs and symptoms and monitoring for clotting signs and symptoms    Plan made per [facility] anticoagulation protocol    Ciro Bajwa, RN  Anticoagulation Clinic  2/9/2023    _______________________________________________________________________     Anticoagulation Episode Summary     Current INR goal:  Other - see comment   TTR:  52.8 % (1 y)   Target end date:  Indefinite   Send INR reminders to:  [facility]    Indications    Atrial fibrillation (H) [I48.91] [I48.91]  Long-term (current) use of anticoagulants [Z79.01] [Z79.01]  Deep vein thrombosis (DVT) (H) [I82.409]  Atrial fibrillation  unspecified type (H) [I48.91]           Comments:  Goal range: 1.5-2.5         Anticoagulation Care Providers     Provider Role Specialty Phone number    Frida Desai  MD Referring Cardiovascular Disease [phone]    Roberto Sarmiento MD Referring Internal Medicine [phone]

## 2023-02-13 ENCOUNTER — TELEPHONE (OUTPATIENT)
Dept: INTERNAL MEDICINE | Facility: CLINIC | Age: 88
End: 2023-02-13
Payer: COMMERCIAL

## 2023-02-13 DIAGNOSIS — R79.89 HIGH SERUM METHYLMALONATE: ICD-10-CM

## 2023-02-13 DIAGNOSIS — N18.31 STAGE 3A CHRONIC KIDNEY DISEASE (H): Primary | ICD-10-CM

## 2023-02-13 DIAGNOSIS — D64.9 ANEMIA, UNSPECIFIED TYPE: ICD-10-CM

## 2023-02-13 LAB
MDC_IDC_EPISODE_DTM: NORMAL
MDC_IDC_EPISODE_DURATION: 11 S
MDC_IDC_EPISODE_DURATION: 4 S
MDC_IDC_EPISODE_DURATION: 56 S
MDC_IDC_EPISODE_DURATION: 5653 S
MDC_IDC_EPISODE_DURATION: NORMAL S
MDC_IDC_EPISODE_ID: 80
MDC_IDC_EPISODE_ID: 81
MDC_IDC_EPISODE_ID: 82
MDC_IDC_EPISODE_ID: 83
MDC_IDC_EPISODE_ID: 84
MDC_IDC_EPISODE_TYPE: NORMAL
MDC_IDC_LEAD_IMPLANT_DT: NORMAL
MDC_IDC_LEAD_IMPLANT_DT: NORMAL
MDC_IDC_LEAD_LOCATION: NORMAL
MDC_IDC_LEAD_LOCATION: NORMAL
MDC_IDC_LEAD_LOCATION_DETAIL_1: NORMAL
MDC_IDC_LEAD_LOCATION_DETAIL_1: NORMAL
MDC_IDC_LEAD_MFG: NORMAL
MDC_IDC_LEAD_MFG: NORMAL
MDC_IDC_LEAD_MODEL: NORMAL
MDC_IDC_LEAD_MODEL: NORMAL
MDC_IDC_LEAD_POLARITY_TYPE: NORMAL
MDC_IDC_LEAD_POLARITY_TYPE: NORMAL
MDC_IDC_LEAD_SERIAL: NORMAL
MDC_IDC_LEAD_SERIAL: NORMAL
MDC_IDC_MSMT_BATTERY_DTM: NORMAL
MDC_IDC_MSMT_BATTERY_REMAINING_LONGEVITY: 56 MO
MDC_IDC_MSMT_BATTERY_RRT_TRIGGER: 2.73
MDC_IDC_MSMT_BATTERY_STATUS: NORMAL
MDC_IDC_MSMT_BATTERY_VOLTAGE: 2.95 V
MDC_IDC_MSMT_CAP_CHARGE_DTM: NORMAL
MDC_IDC_MSMT_CAP_CHARGE_ENERGY: 18 J
MDC_IDC_MSMT_CAP_CHARGE_TIME: 3.91
MDC_IDC_MSMT_CAP_CHARGE_TYPE: NORMAL
MDC_IDC_MSMT_LEADCHNL_RA_IMPEDANCE_VALUE: 456 OHM
MDC_IDC_MSMT_LEADCHNL_RA_PACING_THRESHOLD_AMPLITUDE: 0.75 V
MDC_IDC_MSMT_LEADCHNL_RA_PACING_THRESHOLD_PULSEWIDTH: 0.4 MS
MDC_IDC_MSMT_LEADCHNL_RA_SENSING_INTR_AMPL: 0.5 MV
MDC_IDC_MSMT_LEADCHNL_RV_IMPEDANCE_VALUE: 285 OHM
MDC_IDC_MSMT_LEADCHNL_RV_IMPEDANCE_VALUE: 342 OHM
MDC_IDC_MSMT_LEADCHNL_RV_PACING_THRESHOLD_AMPLITUDE: 0.75 V
MDC_IDC_MSMT_LEADCHNL_RV_PACING_THRESHOLD_PULSEWIDTH: 0.4 MS
MDC_IDC_MSMT_LEADCHNL_RV_SENSING_INTR_AMPL: 6.62 MV
MDC_IDC_PG_IMPLANT_DTM: NORMAL
MDC_IDC_PG_MFG: NORMAL
MDC_IDC_PG_MODEL: NORMAL
MDC_IDC_PG_SERIAL: NORMAL
MDC_IDC_PG_TYPE: NORMAL
MDC_IDC_SESS_CLINIC_NAME: NORMAL
MDC_IDC_SESS_DTM: NORMAL
MDC_IDC_SESS_TYPE: NORMAL
MDC_IDC_SET_BRADY_AT_MODE_SWITCH_RATE: 171 {BEATS}/MIN
MDC_IDC_SET_BRADY_HYSTRATE: NORMAL
MDC_IDC_SET_BRADY_LOWRATE: 60 {BEATS}/MIN
MDC_IDC_SET_BRADY_MAX_SENSOR_RATE: 130 {BEATS}/MIN
MDC_IDC_SET_BRADY_MAX_TRACKING_RATE: 130 {BEATS}/MIN
MDC_IDC_SET_BRADY_MODE: NORMAL
MDC_IDC_SET_BRADY_PAV_DELAY_LOW: 180 MS
MDC_IDC_SET_BRADY_SAV_DELAY_LOW: 150 MS
MDC_IDC_SET_LEADCHNL_RA_PACING_AMPLITUDE: 1.5 V
MDC_IDC_SET_LEADCHNL_RA_PACING_ANODE_ELECTRODE_1: NORMAL
MDC_IDC_SET_LEADCHNL_RA_PACING_ANODE_LOCATION_1: NORMAL
MDC_IDC_SET_LEADCHNL_RA_PACING_CAPTURE_MODE: NORMAL
MDC_IDC_SET_LEADCHNL_RA_PACING_CATHODE_ELECTRODE_1: NORMAL
MDC_IDC_SET_LEADCHNL_RA_PACING_CATHODE_LOCATION_1: NORMAL
MDC_IDC_SET_LEADCHNL_RA_PACING_POLARITY: NORMAL
MDC_IDC_SET_LEADCHNL_RA_PACING_PULSEWIDTH: 0.4 MS
MDC_IDC_SET_LEADCHNL_RA_SENSING_ANODE_ELECTRODE_1: NORMAL
MDC_IDC_SET_LEADCHNL_RA_SENSING_ANODE_LOCATION_1: NORMAL
MDC_IDC_SET_LEADCHNL_RA_SENSING_CATHODE_ELECTRODE_1: NORMAL
MDC_IDC_SET_LEADCHNL_RA_SENSING_CATHODE_LOCATION_1: NORMAL
MDC_IDC_SET_LEADCHNL_RA_SENSING_POLARITY: NORMAL
MDC_IDC_SET_LEADCHNL_RA_SENSING_SENSITIVITY: 0.3 MV
MDC_IDC_SET_LEADCHNL_RV_PACING_AMPLITUDE: 2 V
MDC_IDC_SET_LEADCHNL_RV_PACING_ANODE_ELECTRODE_1: NORMAL
MDC_IDC_SET_LEADCHNL_RV_PACING_ANODE_LOCATION_1: NORMAL
MDC_IDC_SET_LEADCHNL_RV_PACING_CAPTURE_MODE: NORMAL
MDC_IDC_SET_LEADCHNL_RV_PACING_CATHODE_ELECTRODE_1: NORMAL
MDC_IDC_SET_LEADCHNL_RV_PACING_CATHODE_LOCATION_1: NORMAL
MDC_IDC_SET_LEADCHNL_RV_PACING_POLARITY: NORMAL
MDC_IDC_SET_LEADCHNL_RV_PACING_PULSEWIDTH: 0.4 MS
MDC_IDC_SET_LEADCHNL_RV_SENSING_ANODE_ELECTRODE_1: NORMAL
MDC_IDC_SET_LEADCHNL_RV_SENSING_ANODE_LOCATION_1: NORMAL
MDC_IDC_SET_LEADCHNL_RV_SENSING_CATHODE_ELECTRODE_1: NORMAL
MDC_IDC_SET_LEADCHNL_RV_SENSING_CATHODE_LOCATION_1: NORMAL
MDC_IDC_SET_LEADCHNL_RV_SENSING_POLARITY: NORMAL
MDC_IDC_SET_LEADCHNL_RV_SENSING_SENSITIVITY: 0.45 MV
MDC_IDC_SET_ZONE_DETECTION_BEATS_DENOMINATOR: 24 {BEATS}
MDC_IDC_SET_ZONE_DETECTION_BEATS_NUMERATOR: 18 {BEATS}
MDC_IDC_SET_ZONE_DETECTION_INTERVAL: 300 MS
MDC_IDC_SET_ZONE_DETECTION_INTERVAL: 320 MS
MDC_IDC_SET_ZONE_DETECTION_INTERVAL: 350 MS
MDC_IDC_SET_ZONE_DETECTION_INTERVAL: 430 MS
MDC_IDC_SET_ZONE_DETECTION_INTERVAL: NORMAL
MDC_IDC_SET_ZONE_TYPE: NORMAL
MDC_IDC_STAT_AT_BURDEN_PERCENT: 0.2 %
MDC_IDC_STAT_AT_DTM_END: NORMAL
MDC_IDC_STAT_AT_DTM_START: NORMAL
MDC_IDC_STAT_BRADY_AP_VP_PERCENT: 32.11 %
MDC_IDC_STAT_BRADY_AP_VS_PERCENT: 35.49 %
MDC_IDC_STAT_BRADY_AS_VP_PERCENT: 10.3 %
MDC_IDC_STAT_BRADY_AS_VS_PERCENT: 22.09 %
MDC_IDC_STAT_BRADY_DTM_END: NORMAL
MDC_IDC_STAT_BRADY_DTM_START: NORMAL
MDC_IDC_STAT_BRADY_RA_PERCENT_PACED: 65.71 %
MDC_IDC_STAT_BRADY_RV_PERCENT_PACED: 42.64 %
MDC_IDC_STAT_EPISODE_RECENT_COUNT: 0
MDC_IDC_STAT_EPISODE_RECENT_COUNT: 1
MDC_IDC_STAT_EPISODE_RECENT_COUNT: 1
MDC_IDC_STAT_EPISODE_RECENT_COUNT: 10
MDC_IDC_STAT_EPISODE_RECENT_COUNT_DTM_END: NORMAL
MDC_IDC_STAT_EPISODE_RECENT_COUNT_DTM_START: NORMAL
MDC_IDC_STAT_EPISODE_TOTAL_COUNT: 0
MDC_IDC_STAT_EPISODE_TOTAL_COUNT: 1
MDC_IDC_STAT_EPISODE_TOTAL_COUNT: 6
MDC_IDC_STAT_EPISODE_TOTAL_COUNT: 77
MDC_IDC_STAT_EPISODE_TOTAL_COUNT_DTM_END: NORMAL
MDC_IDC_STAT_EPISODE_TOTAL_COUNT_DTM_START: NORMAL
MDC_IDC_STAT_EPISODE_TYPE: NORMAL
MDC_IDC_STAT_TACHYTHERAPY_ATP_DELIVERED_RECENT: 0
MDC_IDC_STAT_TACHYTHERAPY_ATP_DELIVERED_TOTAL: 0
MDC_IDC_STAT_TACHYTHERAPY_RECENT_DTM_END: NORMAL
MDC_IDC_STAT_TACHYTHERAPY_RECENT_DTM_START: NORMAL
MDC_IDC_STAT_TACHYTHERAPY_SHOCKS_ABORTED_RECENT: 0
MDC_IDC_STAT_TACHYTHERAPY_SHOCKS_ABORTED_TOTAL: 0
MDC_IDC_STAT_TACHYTHERAPY_SHOCKS_DELIVERED_RECENT: 0
MDC_IDC_STAT_TACHYTHERAPY_SHOCKS_DELIVERED_TOTAL: 0
MDC_IDC_STAT_TACHYTHERAPY_TOTAL_DTM_END: NORMAL
MDC_IDC_STAT_TACHYTHERAPY_TOTAL_DTM_START: NORMAL

## 2023-02-13 NOTE — TELEPHONE ENCOUNTER
[facility] Call Center    Phone Message    May a detailed message be left on voicemail: yes     Reason for Call: Other: Pt's spouse Rica requesting call back. She called and stated she can not get the pt out of the house right now, so she is needing lab orders faxed to their homecare at [phone]. She is also needing to discuss a virtual or telephone appt with Dr. Sarmiento

## 2023-02-14 NOTE — TELEPHONE ENCOUNTER
Lab orders faxed via [employer]fax.      Niranjan Galaviz CMA ([facility]) at 10:12 AM on 2/14/2023

## 2023-02-16 ENCOUNTER — TRANSFERRED RECORDS (OUTPATIENT)
Dept: HEALTH INFORMATION MANAGEMENT | Facility: CLINIC | Age: 88
End: 2023-02-16

## 2023-02-16 ENCOUNTER — LAB REQUISITION (OUTPATIENT)
Dept: LAB | Facility: CLINIC | Age: 88
End: 2023-02-16
Payer: COMMERCIAL

## 2023-02-16 ENCOUNTER — ANTICOAGULATION THERAPY VISIT (OUTPATIENT)
Dept: ANTICOAGULATION | Facility: CLINIC | Age: 88
End: 2023-02-16
Payer: COMMERCIAL

## 2023-02-16 DIAGNOSIS — I48.91 ATRIAL FIBRILLATION, UNSPECIFIED TYPE (H): Primary | ICD-10-CM

## 2023-02-16 DIAGNOSIS — Z79.01 LONG TERM CURRENT USE OF ANTICOAGULANT THERAPY: ICD-10-CM

## 2023-02-16 DIAGNOSIS — I82.409 DEEP VEIN THROMBOSIS (DVT) (H): ICD-10-CM

## 2023-02-16 LAB
ALBUMIN SERPL BCG-MCNC: 3.7 G/DL (ref 3.5–5.2)
ALP SERPL-CCNC: 70 U/L (ref 40–129)
ALT SERPL W P-5'-P-CCNC: 11 U/L (ref 10–50)
ANION GAP SERPL CALCULATED.3IONS-SCNC: 11 MMOL/L (ref 7–15)
AST SERPL W P-5'-P-CCNC: 19 U/L (ref 10–50)
BILIRUB SERPL-MCNC: 0.2 MG/DL
BUN SERPL-MCNC: 56.9 MG/DL (ref 8–23)
CALCIUM SERPL-MCNC: 9.5 MG/DL (ref 8.8–10.2)
CHLORIDE SERPL-SCNC: 110 MMOL/L (ref 98–107)
CREAT SERPL-MCNC: 1.53 MG/DL (ref 0.67–1.17)
DEPRECATED HCO3 PLAS-SCNC: 18 MMOL/L (ref 22–29)
GFR SERPL CREATININE-BSD FRML MDRD: 44 ML/MIN/1.73M2
GLUCOSE SERPL-MCNC: 104 MG/DL (ref 70–99)
HOLD SPECIMEN: NORMAL
INR (EXTERNAL): 1.7 (ref 0.9–1.1)
POTASSIUM SERPL-SCNC: 4.5 MMOL/L (ref 3.4–5.3)
PROT SERPL-MCNC: 6.8 G/DL (ref 6.4–8.3)
SODIUM SERPL-SCNC: 139 MMOL/L (ref 136–145)

## 2023-02-16 PROCEDURE — 80053 COMPREHEN METABOLIC PANEL: CPT | Mod: ORL | Performed by: INTERNAL MEDICINE

## 2023-02-16 NOTE — PROGRESS NOTES
ANTICOAGULATION MANAGEMENT     Noe Florence 87 year old male is on warfarin with therapeutic INR result. (Goal INR 1.5 - 2.5).    Recent labs: (last 7 days)     02/16/23  0000   INR 1.7*       ASSESSMENT       Source(s): Chart Review, Patient/Caregiver Call and Home Care/Facility Nurse       Warfarin doses taken: Warfarin taken as instructed    Diet: No new diet changes identified    New illness, injury, or hospitalization: No    Medication/supplement changes: None noted    Signs or symptoms of bleeding or clotting: No    Previous INR: Therapeutic last visit; previously outside of goal range    Additional findings: None       PLAN     Recommended plan for no diet, medication or health factor changes affecting INR     Dosing Instructions: Continue your current warfarin dose with next INR in 1 week       Summary  As of 2/16/2023    Full warfarin instructions:  5 mg every Sun, Wed; 2.5 mg all other days   Next INR check:  2/23/2023             Telephone call with [facility] home care nurse who verbalizes understanding and agrees to plan    Orders given to  Homecare nurse/facility to recheck    Education provided:     Contact [phone] with any changes, questions or concerns.     Plan made per [facility] anticoagulation protocol    aDnya Edwards RN  Anticoagulation Clinic  2/16/2023    _______________________________________________________________________     Anticoagulation Episode Summary     Current INR goal:  Other - see comment   TTR:  51.6 % (1 y)   Target end date:  Indefinite   Send INR reminders to:  [facility]    Indications    Atrial fibrillation (H) [I48.91] [I48.91]  Long-term (current) use of anticoagulants [Z79.01] [Z79.01]  Deep vein thrombosis (DVT) (H) [I82.409]  Atrial fibrillation  unspecified type (H) [I48.91]           Comments:  Goal range: 1.5-2.5         Anticoagulation Care Providers     Provider Role Specialty Phone number    Frida Desai MD Referring Cardiovascular Disease  [phone]    Roberto Sarmiento MD [facility] Internal Medicine [phone]

## 2023-02-17 DIAGNOSIS — D64.9 ANEMIA, UNSPECIFIED TYPE: Primary | ICD-10-CM

## 2023-02-17 NOTE — PROGRESS NOTES
ANTICOAGULATION FOLLOW-UP CLINIC VISIT    Patient Name:  Noe Florence  Date:  1/6/2017  Contact Type:  Telephone    SUBJECTIVE:        OBJECTIVE    INR   Date Value Ref Range Status   01/06/2017 2.27* 0.86 - 1.14 Final       ASSESSMENT / PLAN  INR assessment THER    Recheck INR In: 3 WEEKS    INR Location Clinic      Anticoagulation Summary as of 1/6/2017     INR goal 2.0-3.0   Selected INR 2.27 (1/6/2017)   Maintenance plan 2.5 mg (5 mg x 0.5) on Mon; 5 mg (5 mg x 1) all other days   Full instructions 2.5 mg on Mon; 5 mg all other days   Weekly total 32.5 mg   No change documented Danya Khan RN   Plan last modified Roya Manning RN (12/22/2016)   Next INR check 1/25/2017   Priority INR   Target end date Indefinite    Indications   Atrial fibrillation (H) [I48.91] [I48.91]  Long-term (current) use of anticoagulants [Z79.01] [Z79.01]         Anticoagulation Episode Summary     INR check location     Preferred lab     Send INR reminders to [facility]    Comments Patient contact number: [phone]   Can leave results with Rica (friend)      Anticoagulation Care Providers     Provider Role Specialty Phone number    Deedee Baird MD Responsible Cardiology [phone]            See the Encounter Report to view Anticoagulation Flowsheet and Dosing Calendar (Go to Encounters tab in chart review, and find the Anticoagulation Therapy Visit)    Left message with results and dosing recommendations. Asked patient to call back to report any missed doses, falls, signs and symptoms of bleeding or clotting, or any changes to health or diet.   Danya Khan, RN                  History/Exam/Medical Decision Making

## 2023-02-21 ENCOUNTER — DOCUMENTATION ONLY (OUTPATIENT)
Dept: INTERNAL MEDICINE | Facility: CLINIC | Age: 88
End: 2023-02-21

## 2023-02-21 ENCOUNTER — TRANSFERRED RECORDS (OUTPATIENT)
Dept: HEALTH INFORMATION MANAGEMENT | Facility: CLINIC | Age: 88
End: 2023-02-21

## 2023-02-21 NOTE — PROGRESS NOTES
Type of Form Received: plan of care    Form Received (Date) 2/21/23   Form Filled out Yes 2/27/23   Placed in provider folder Yes

## 2023-02-22 ENCOUNTER — VIRTUAL VISIT (OUTPATIENT)
Dept: INTERNAL MEDICINE | Facility: CLINIC | Age: 88
End: 2023-02-22
Payer: COMMERCIAL

## 2023-02-22 ENCOUNTER — LAB (OUTPATIENT)
Dept: LAB | Facility: CLINIC | Age: 88
End: 2023-02-22
Payer: COMMERCIAL

## 2023-02-22 ENCOUNTER — ANTICOAGULATION THERAPY VISIT (OUTPATIENT)
Dept: ANTICOAGULATION | Facility: CLINIC | Age: 88
End: 2023-02-22

## 2023-02-22 DIAGNOSIS — Z79.01 LONG TERM CURRENT USE OF ANTICOAGULANT THERAPY: ICD-10-CM

## 2023-02-22 DIAGNOSIS — I48.91 ATRIAL FIBRILLATION, UNSPECIFIED TYPE (H): Primary | ICD-10-CM

## 2023-02-22 DIAGNOSIS — I82.409 DEEP VEIN THROMBOSIS (DVT) (H): ICD-10-CM

## 2023-02-22 DIAGNOSIS — R30.0 DYSURIA: ICD-10-CM

## 2023-02-22 DIAGNOSIS — N30.00 ACUTE CYSTITIS WITHOUT HEMATURIA: Primary | ICD-10-CM

## 2023-02-22 LAB
ALBUMIN UR-MCNC: 100 MG/DL
APPEARANCE UR: ABNORMAL
BILIRUB UR QL STRIP: NEGATIVE
COLOR UR AUTO: ABNORMAL
GLUCOSE UR STRIP-MCNC: NEGATIVE MG/DL
HGB UR QL STRIP: ABNORMAL
INR (EXTERNAL): 1.5 (ref 0.9–1.1)
KETONES UR STRIP-MCNC: NEGATIVE MG/DL
LEUKOCYTE ESTERASE UR QL STRIP: ABNORMAL
NITRATE UR QL: NEGATIVE
PH UR STRIP: 7.5 [PH] (ref 5–7)
RBC URINE: 113 /HPF
SP GR UR STRIP: 1.02 (ref 1–1.03)
UROBILINOGEN UR STRIP-MCNC: NORMAL MG/DL
WBC CLUMPS #/AREA URNS HPF: PRESENT /HPF
WBC URINE: >182 /HPF

## 2023-02-22 PROCEDURE — 81001 URINALYSIS AUTO W/SCOPE: CPT | Performed by: PATHOLOGY

## 2023-02-22 PROCEDURE — 99213 OFFICE O/P EST LOW 20 MIN: CPT | Mod: VID | Performed by: INTERNAL MEDICINE

## 2023-02-22 PROCEDURE — 87086 URINE CULTURE/COLONY COUNT: CPT | Mod: 90 | Performed by: PATHOLOGY

## 2023-02-22 PROCEDURE — 99000 SPECIMEN HANDLING OFFICE-LAB: CPT | Performed by: PATHOLOGY

## 2023-02-22 RX ORDER — CEFDINIR 300 MG/1
300 CAPSULE ORAL 2 TIMES DAILY
Qty: 20 CAPSULE | Refills: 0 | Status: SHIPPED | OUTPATIENT
Start: 2023-02-22 | End: 2023-03-04

## 2023-02-22 NOTE — NURSING NOTE
Is the patient currently in the state of [place]? YES    Visit mode:VIDEO    If the visit is dropped, the patient can be reconnected by: VIDEO VISIT: Text to cell phone: [phone]    Will anyone else be joining the visit? Wife, Rica, is present      How would you like to obtain your AVS? MyChart    Are changes needed to the allergy or medication list? NO    Reason for visit: Symptoms of urinary tract infection    Isabel Reyes VF

## 2023-02-22 NOTE — PATIENT INSTRUCTIONS
Thank you for visiting the Primary Care Center today at the [facility]! The following is some information about our clinic:     Primary Care Center Frequently-Asked Questions    (1) How do I schedule appointments at the [facility]?     Primary Care--to schedule or make changes to an existing appointment, please call our primary care line at [phone].    Labs--to schedule a lab appointment at the [facility] you can use [employer] or call [phone]. If you have a [place] location that is closer to home, you can reach out to that location for scheduling options.     Imaging--if you need to schedule a CT, X-ray, MRI, ultrasound, or other imaging study you can call [phone] to schedule at the [facility] or any other [facility] imaging location.     Referrals--if a referral to another specialty was ordered you can expect a phone call from their scheduling team. If you have not heard from them in a week, please call us or send us a [employer] message to check the status or get a scheduling number. Please note that this only applies to internal [facility] referrals. If the referral is external you would need to contact their office for scheduling.     (2) I have a question about my visit, who do I contact?     You can call us at the primary care line at [phone] to ask questions about your visit. You can also send a secure message through [employer], which is reviewed by clinic staff. Please note that [employer] messages have a twenty-four to forty-eight business hour turnaround time and should not be used for urgent concerns.    (3) How will I get the results of my tests?    If you are signed up for [employer] all tests will be released to you within twenty-four hours of resulting. Please allow three to five days for your doctor to review your results and place a note interpreting the results. If you do not have [employer] you will receive your  results through mail seven to ten business days following the return of the tests. Please note that if there should be any urgent or concerning results that your doctor or their registered nurse will reach out to you the same day as the tests come back. If you have follow up questions about your results or would like to discuss the results in detail please schedule a follow up with your provider either in person or virtually.     (4) How do I get refills of my prescriptions?     You should always first contact your pharmacy for refills of your medications. If submitting a refill request on [employer] please be sure to submit the request only once--repeat requests can cause delays in refill. If you are requesting a NEW medication or a medication related to new symptoms you will need to schedule an appointment with a provider prior to approval. Please note: Routine medication refills have up to one to three business day turnaround whereas controlled substances refills have up to five to seven business day turnaround.    (5) I have new symptoms, what do I do?     If you are having an immediate medical emergency, you should dial 911 for assistance.   For anything urgent that needs to be seen within a few hours to one day you should visit a local urgent care for assistance.  For non-urgent symptoms that need to be seen within a few days to a week you can schedule with an available provider in primary care by going to [facility] or calling [phone].   If you are not sure how serious your symptoms are or you would like to receive medical advice you can always call [phone] to speak with a triage nurse.

## 2023-02-22 NOTE — PROGRESS NOTES
Sha is a 87 year old who is being evaluated via a billable video visit.      How would you like to obtain your AVS? MyChart  If the video visit is dropped, the invitation should be resent by: Text to cell phone: [phone]  Will anyone else be joining your video visit? Wife also on call      Assessment & Plan     Acute cystitis without hematuria    Sha presents with symptoms and U/A consistent with UTI.  We'll treat with cefdinir since he is on warfarin and this should not interact.  Will follow-up on culture results.  Follow-up as needed if not improving in a week or new/worsening symptoms develop.       - cefdinir (OMNICEF) 300 MG capsule; Take 1 capsule (300 mg) by mouth 2 times daily for 10 days       Catrachito Green MD  [facility] INTERNAL MEDICINE [place]    Subjective   Sha is a 87 year old accompanied by his spouse, presenting for the following health issues:  Video Visit (Symptoms of urinary tract infection)      HPI     Genitourinary - Male  Onset/Duration: possibly several weeks ago with difficulty urinating, caregiver recently suggested this may be a UTI  Description:   Dysuria (painful urination): YES- slight  Hematuria (blood in urine): No  Frequency: n/a incontinent  Hesitancy (delay in urine): YES  Retention (unable to empty): YES  Incontinence: YES  Had some confusion for a few days  Progression of Symptoms:  same  Accompanying Signs & Symptoms:  Fever: No  Back/Flank pain: No  Urethral discharge: some white stuff around the opening once  Testicle lumps/masses/pain: No  Nausea and/or vomiting: No  Abdominal pain: Yes, lower ab  History:   History of frequent UTI s: No  History of kidney stones: No  History of hernias: No  Personal or Family history of Prostate problems: No  Therapies tried and outcome: Tylenol- unclear if this help      Review of Systems   Constitutional, gi and gu systems are negative, except as otherwise noted.      Objective    Vitals - Patient  "Reported  Weight (Patient Reported): 59.9 kg (132 lb)  Height (Patient Reported): 170.2 cm (5' 7\")  BMI (Based on Pt Reported Ht/Wt): 20.67  Pain Score: Mild Pain (2)  Pain Loc: Other - see comment (groin)      Vitals:  No vitals were obtained today due to virtual visit.    Physical Exam   GENERAL: no distress  EYES: Eyes grossly normal to inspection.  No discharge or erythema, or obvious scleral/conjunctival abnormalities.  RESP: No audible wheeze, cough, or visible cyanosis.  No visible retractions or increased work of breathing.    SKIN: Visible skin clear. No significant rash, abnormal pigmentation or lesions.  NEURO: Cranial nerves grossly intact.  Mentation and speech appropriate for age.  PSYCH: slowed speech, affect normal            Video-Visit Details    Type of service:  Video Visit   Video Start Time: 3:05 PM  Video End Time:3:15 PM    Originating Location (pt. Location): Home  Distant Location (provider location):  On-site  Platform used for Video Visit: M[employer]ya     "

## 2023-02-22 NOTE — PROGRESS NOTES
ANTICOAGULATION MANAGEMENT     Noe Florence 87 year old male is on warfarin with therapeutic INR result. (Goal INR 1.5-2.5)    Recent labs: (last 7 days)     02/22/23  0000   INR 1.5*       ASSESSMENT       Source(s): Chart Review, Patient/Caregiver Call and Home Care/Facility Nurse       Warfarin doses taken: Warfarin taken as instructed    Diet: Increased greens/vitamin K in diet; ongoing change    New illness, injury, or hospitalization: Yes: Pt is having symptoms of UTI, has virtual visit this afternoon. Update: pt prescribed Cefdinir 300mg 2/day for 10 days.     Medication/supplement changes: None noted    Signs or symptoms of bleeding or clotting: No    Previous INR: Therapeutic last 2(+) visits    Additional findings: Rica verbalized concerns with pt INR trending down and thinks warfarin dosing should be increased.          PLAN     Recommended plan for no diet, medication or health factor changes affecting INR     Dosing Instructions: Increase your warfarin dose (11.1% change) with next INR in 1 week       Summary  As of 2/22/2023    Full warfarin instructions:  5 mg every Sun, Wed, Fri; 2.5 mg all other days   Next INR check:  3/2/2023             Telephone call with Rica Doherty home care nurse who verbalizes understanding and agrees to plan    Orders given to  Homecare nurse/facility to recheck    Education provided:     Goal range and lab monitoring: goal range and significance of current result and Importance of therapeutic range    Dietary considerations: importance of consistent vitamin K intake and impact of vitamin K foods on INR    Plan made with [facility] Pharmacist Maddy Estrada, RN  Anticoagulation Clinic  2/22/2023    _______________________________________________________________________     Anticoagulation Episode Summary     Current INR goal:  Other - see comment   TTR:  49.9 % (1 y)   Target end date:  Indefinite   Send INR reminders to:  [facility]    Indications     Atrial fibrillation (H) [I48.91] [I48.91]  Long-term (current) use of anticoagulants [Z79.01] [Z79.01]  Deep vein thrombosis (DVT) (H) [I82.409]  Atrial fibrillation  unspecified type (H) [I48.91]           Comments:  Goal range: 1.5-2.5         Anticoagulation Care Providers     Provider Role Specialty Phone number    Frida Desai MD Referring Cardiovascular Disease [phone]    Roberto Sarmiento MD Referring Internal Medicine [phone]

## 2023-02-23 ENCOUNTER — MEDICAL CORRESPONDENCE (OUTPATIENT)
Dept: HEALTH INFORMATION MANAGEMENT | Facility: CLINIC | Age: 88
End: 2023-02-23
Payer: COMMERCIAL

## 2023-02-24 LAB — BACTERIA UR CULT: NORMAL

## 2023-02-26 ENCOUNTER — MEDICAL CORRESPONDENCE (OUTPATIENT)
Dept: HEALTH INFORMATION MANAGEMENT | Facility: CLINIC | Age: 88
End: 2023-02-26
Payer: COMMERCIAL

## 2023-03-01 ENCOUNTER — MEDICAL CORRESPONDENCE (OUTPATIENT)
Dept: HEALTH INFORMATION MANAGEMENT | Facility: CLINIC | Age: 88
End: 2023-03-01
Payer: COMMERCIAL

## 2023-03-01 ENCOUNTER — ANTICOAGULATION THERAPY VISIT (OUTPATIENT)
Dept: ANTICOAGULATION | Facility: CLINIC | Age: 88
End: 2023-03-01
Payer: COMMERCIAL

## 2023-03-01 DIAGNOSIS — I48.91 ATRIAL FIBRILLATION, UNSPECIFIED TYPE (H): Primary | ICD-10-CM

## 2023-03-01 DIAGNOSIS — I82.409 DEEP VEIN THROMBOSIS (DVT) (H): ICD-10-CM

## 2023-03-01 DIAGNOSIS — Z79.01 LONG TERM CURRENT USE OF ANTICOAGULANT THERAPY: ICD-10-CM

## 2023-03-01 LAB — INR (EXTERNAL): 1.6 (ref 0.9–1.1)

## 2023-03-01 NOTE — PROGRESS NOTES
1.6 abx UTI Omnicef until 3/4    ANTICOAGULATION MANAGEMENT     Noe Florence 87 year old male is on warfarin with therapeutic INR result. (Goal INR 1.5-2.5)    Recent labs: (last 7 days)     03/01/23  1243   INR 1.6*       ASSESSMENT       Source(s): Chart Review, Patient/Caregiver Call and Home Care/Facility Nurse       Warfarin doses taken: Warfarin taken as instructed    Diet: No new diet changes identified    New illness, injury, or hospitalization: Yes: recent UTI    Medication/supplement changes: Continues on Omnicef for 3 more days, 10 day course for UTI    Signs or symptoms of bleeding or clotting: No    Previous INR: Therapeutic last 2(+) visits    Additional findings: pt is being discharged from [facility] today. Discussed other options such as a home monitor-declined home monitor. Called [facility] after ok received from Rica-patient should be covered for this. Facesheet and standing lab orders faxed to [facility] per request. Updated Rica-recheck INR in 1 week or ASAP         PLAN     Recommended plan for temporary change(s) affecting INR     Dosing Instructions: Continue your current warfarin dose with next INR in 1 week       Summary  As of 3/1/2023    Full warfarin instructions:  5 mg every Sun, Wed, Fri; 2.5 mg all other days   Next INR check:  3/8/2023             Telephone call with [facility] home care nurse who verbalizes understanding and agrees to plan  Rica also on telephone call    Orders given to [facility] ([phone])    Education provided:     Interaction IS anticipated between warfarin and Omnicef    Symptom monitoring: monitoring for bleeding signs and symptoms    Contact [phone] with any changes, questions or concerns.     Plan made per [facility] anticoagulation protocol    BEVERLEY LOONEY, BYRON  Anticoagulation Clinic  3/1/2023    _______________________________________________________________________     Anticoagulation Episode Summary     Current INR  goal:  Other - see comment   TTR:  48.0 % (1 y)   Target end date:  Indefinite   Send INR reminders to:  [facility]    Indications    Atrial fibrillation (H) [I48.91] [I48.91]  Long-term (current) use of anticoagulants [Z79.01] [Z79.01]  Deep vein thrombosis (DVT) (H) [I82.409]  Atrial fibrillation  unspecified type (H) [I48.91]           Comments:  Goal range: 1.5-2.5         Anticoagulation Care Providers     Provider Role Specialty Phone number    Frida Desai MD Referring Cardiovascular Disease [phone]    [facility] System[facility] bal MD Referring Internal Medicine [phone]

## 2023-03-06 ENCOUNTER — ANTICOAGULATION THERAPY VISIT (OUTPATIENT)
Dept: ANTICOAGULATION | Facility: CLINIC | Age: 88
End: 2023-03-06
Payer: COMMERCIAL

## 2023-03-06 ENCOUNTER — TRANSFERRED RECORDS (OUTPATIENT)
Dept: HEALTH INFORMATION MANAGEMENT | Facility: CLINIC | Age: 88
End: 2023-03-06
Payer: COMMERCIAL

## 2023-03-06 DIAGNOSIS — I48.91 ATRIAL FIBRILLATION, UNSPECIFIED TYPE (H): ICD-10-CM

## 2023-03-06 DIAGNOSIS — I82.409 DEEP VEIN THROMBOSIS (DVT) (H): ICD-10-CM

## 2023-03-06 DIAGNOSIS — Z79.01 LONG TERM CURRENT USE OF ANTICOAGULANT THERAPY: Primary | ICD-10-CM

## 2023-03-06 LAB — INR (EXTERNAL): 1.6 (ref 0.9–1.1)

## 2023-03-06 NOTE — PROGRESS NOTES
ANTICOAGULATION MANAGEMENT     Noe Florence 87 year old male is on warfarin with therapeutic INR result. (Goal INR 1.5-2.5)    Recent labs: (last 7 days)     03/06/23  0000   INR 1.6*       ASSESSMENT       Source(s): Chart Review    Previous INR was Therapeutic last 2(+) visits    Medication, diet, health changes since last INR chart reviewed; none identified             PLAN     Recommended plan for no diet, medication or health factor changes affecting INR     Dosing Instructions: Continue your current warfarin dose with next INR in 1-2 weeks       Summary  As of 3/6/2023    Full warfarin instructions:  5 mg every Sun, Wed, Fri; 2.5 mg all other days   Next INR check:  3/20/2023             Detailed voice message left for  JOSSY Strauss  with dosing instructions and follow up date.     Contact [phone] to schedule and with any changes, questions or concerns.     Education provided:     Please call back if any changes to your diet, medications or how you've been taking warfarin    Contact [phone] with any changes, questions or concerns.     Plan made per [facility] anticoagulation protocol    Nguyen Zuniga RN  Anticoagulation Clinic  3/6/2023    _______________________________________________________________________     Anticoagulation Episode Summary     Current INR goal:  Other - see comment   TTR:  46.6 % (1 y)   Target end date:  Indefinite   Send INR reminders to:  [facility]    Indications    Atrial fibrillation (H) [I48.91] [I48.91]  Long-term (current) use of anticoagulants [Z79.01] [Z79.01]  Deep vein thrombosis (DVT) (H) [I82.409]  Atrial fibrillation  unspecified type (H) [I48.91]           Comments:  Goal range: 1.5-2.5         Anticoagulation Care Providers     Provider Role Specialty Phone number    Roberto Sarmiento MD Referring Internal Medicine [phone]

## 2023-03-24 NOTE — TELEPHONE ENCOUNTER
3/24/23    Returned Rica SARAH  Called [facility].     Left message for [facility] to draw an INR on 3/28/23.    Ciro Bajwa RN

## 2023-03-27 NOTE — TELEPHONE ENCOUNTER
3/27/23 Addendum:  Confirmed with Dulcy at [facility] that Bill has an appt with them for an INR on 3/28/23.  Danya Edwards RN

## 2023-03-28 NOTE — PROGRESS NOTES
HPI  87-year-old presents today for Medicare wellness visit with his wife with a list of multiple concerns.  He was in transitional care however there was not a lot of stimulation or activity.  He spent most of the day in the wheelchair he is back at home now she has some assistance helps with him the morning and the evening and he is not had any recent falls.  He has however been getting weaker and he is having more discomfort both in his knees as well as his low back.  At times this is worse at night and he gets more agitated and irritated at night with this.  He has been taking Tylenol at bedtime but this has been insufficient for controlling the discomfort.  Said issues with earwax and they are requesting that this be cleaned.  Said a rash on his bottom otherwise has had no problems with his present medications he has had some ongoing issues with confusion.  Past Medical History:   Diagnosis Date     Advanced open-angle glaucoma      Atrial fibrillation (H)      CKD (chronic kidney disease), stage III (H) 2005     Colon cancer (H)     Stage II-B colon cancer     Coronary artery disease     s/p CABG x 2, JEREMY x 2     Diverticulitis      Hyperlipidemia      Hypertension      Ischemic cardiomyopathy      MGUS (monoclonal gammopathy of unknown significance)      Nonsenile cataract     BE     Osteoporosis     left hip fracture     Polymorphic ventricular tachycardia      Polymyalgia rheumatica (H)      PVD (posterior vitreous detachment), both eyes 2005     S/P ablation of atrial flutter 6/20/14    CTI     Stented coronary artery      SVT (supraventricular tachycardia) (H)     PPM/AICD for NSVT     Upper leg DVT (deep venous thromboembolism), chronic (H)     Left     Weight loss, unintentional 2017    15 lb in 4 months     Past Surgical History:   Procedure Laterality Date     CATARACT IOL, RT/LT Bilateral      COLONOSCOPY  3/13/2014    Procedure: COMBINED COLONOSCOPY, SINGLE BIOPSY/POLYPECTOMY BY BIOPSY;;  Surgeon:  Mary Gerber MD;  Location:  GI     COLONOSCOPY N/A 6/22/2015    Procedure: COLONOSCOPY;  Surgeon: Marilin Newman MD;  Location:  GI     COLONOSCOPY N/A 11/7/2018    Procedure: COMBINED COLONOSCOPY, SINGLE OR MULTIPLE BIOPSY/POLYPECTOMY BY BIOPSY;  Surgeon: Roberto Esteban MD;  Location:  GI     EP ICD GENERATOR REPLACEMENT DUAL N/A 12/11/2018    Procedure: EP ICD Generator Change Dual;  Surgeon: Deedee Baird MD;  Location:  HEART CARDIAC CATH LAB     H ABLATION ATRIAL FLUTTER       HEART CATH DRUG ELUTING STENT PLACEMENT  4/19/2012    JEREMY x 2 to LCx     IMPLANT IMPLANTABLE CARDIOVERTER DEFIBRILLATOR  5-    ppm/aicd     KNEE SURGERY      right and left knee surgeries     LAPAROSCOPIC ASSISTED COLECTOMY Right 2/1/2019    Procedure: Laparoscopic Converted to Open Transverse Colectomy with Lysis of Adhesions;  Surgeon: Chanelle Guzmán MD;  Location:  OR     LAPAROSCOPIC ASSISTED COLECTOMY LEFT (DESCENDING)  4/8/2014    Procedure: LAPAROSCOPIC ASSISTED COLECTOMY LEFT (DESCENDING);  Hand assisted Laparoscopic Sigmoid Colectomy , Laparoscopic mobilization of spleenic flexure *Latex Allergy*Anesthesia General with Block;  Surgeon: Chanelle Guzmán MD;  Location: [facility] OR     OPEN REDUCTION INTERNAL FIXATION RODDING INTRAMEDULLAR FEMUR FRACTURE TABLE Left 3/14/2019    Procedure: Open Reduction Internal Fixation Left Femur Intramedullar Nailing;  Surgeon: Srikanth Avalos MD;  Location:  OR     REPAIR VALVE MITRAL  2006     30-mm Medtronic Julian ring      SELECTIVE LASER TRABECULOPLASTY (SLT) OD (RIGHT EYE)  4/10, 1/12,+1/9/13    RE     SELECTIVE LASER TRABECULOPLASTY (SLT) OS (LEFT EYE)  5/2012     SHOULDER SURGERY      right rotator cuff     ZZC CABG, ARTERY-VEIN, FOUR  2006    LIMA-LAD, SVG-Rt PDA, SVG-OM2, SVG-Diag 1     ZZC CABG, VEIN, SINGLE  1994    1-vessel CABG, SVG->PDRCA      Family History   Problem Relation Age of Onset     C.A.D. Father       Anesthesia Reaction No family hx of      Crohn's Disease No family hx of      Ulcerative Colitis No family hx of      Cancer - colorectal No family hx of      Macular Degeneration No family hx of      Cancer No family hx of         No known family hx of skin cancer     Melanoma No family hx of      Skin Cancer No family hx of      Answers for HPI/ROS submitted by the patient on 3/28/2023  General Symptoms: No  Skin Symptoms: Yes  HENT Symptoms: No  EYE SYMPTOMS: No  HEART SYMPTOMS: No  LUNG SYMPTOMS: No  INTESTINAL SYMPTOMS: No  URINARY SYMPTOMS: Yes  REPRODUCTIVE SYMPTOMS: No  SKELETAL SYMPTOMS: Yes  BLOOD SYMPTOMS: No  NERVOUS SYSTEM SYMPTOMS: Yes  MENTAL HEALTH SYMPTOMS: Yes  Changes in hair: No  Changes in moles/birth marks: No  Itching: No  Rashes: Yes  Changes in nails: No  Acne: No  Change in facial hair: No  Warts: No  Non-healing sores: No  Scarring: No  Flaking of skin: Yes  Color changes of hands/feet in cold : No  Sun sensitivity: No  Skin thickening: No  Trouble holding urine or incontinence: Yes  Pain or burning: No  Trouble starting or stopping: Yes  Increased frequency of urination: Yes  Blood in urine: No  Decreased frequency of urination: No  Frequent nighttime urination: Yes  Flank pain: Yes  Difficulty emptying bladder: Yes  Back pain: Yes  Muscle aches: Yes  Neck pain: No  Swollen joints: No  Joint pain: No  Bone pain: Yes  Muscle cramps: Yes  Muscle weakness: Yes  Joint stiffness: Yes  Bone fracture: No  Trouble with coordination: Yes  Dizziness or trouble with balance: Yes  Fainting or black-out spells: No  Memory loss: Yes  Headache: No  Seizures: No  Speech problems: Yes  Tingling: No  Tremor: No  Weakness: Yes  Difficulty walking: Yes  Paralysis: No  Numbness: No  Nervous or Anxious: Yes  Depression: Yes  Trouble sleeping: Yes  Trouble thinking or concentrating: Yes  Mood changes: No  Panic attacks: No        Exam:  /76 (BP Location: Right arm, Patient Position: Sitting, Cuff  "Size: Adult Regular)   Pulse 60   Ht 1.702 m (5' 7.01\")   SpO2 99%   BMI 23.99 kg/m    Physical Exam   The patient is alert, oriented with a clear sensorium.   Skin shows tinea corporis rash in the buttock  Head is normocephalic and atraumatic.   Eyes show PERRLA  Ears show cerumen on the right.   Mouth shows clear oral mucosa.   Neck shows no nodes, thyromegaly or bruits.   Back is non tender.  Lungs are clear to percussion and auscultation.   Heart shows normal S1 and S2 without murmur or gallop.   Abdomen is soft   Extremities show severe degenerative arthritis of the knees left greater than right      ASSESSMENT  1 Tinea corporis   2 DJD knees  3 history colon cancer S/P laparoscopic resection 2019  4 hypertension well controlled  5 hyperlipidemia on atorvastatin  6 renal insufficiency   7 anemia with elevated MMA  8 atrial fibrillation on warfarin  9 peripheral arterial disease  10 monoclonal gammopathy stable  11 Hip fracture with nailing 3/14/2019  12 CAD S/P CABGx2 with JEREMY  13 V Tach S/P ICD  14 osteoporosis on alendronate  15 venous insufficiency with history  DVT  16 History depression on 3 drugs per psychiatry  17 LUTS  on tamsulosin    Plan  We will refer for probable joint injection in the knees.  We will have him get a wheelchair and a stand assist to help with his mobility in the home.  We will irrigate the cerumen from the left ear.  We will use diclofenac gel for the knee pain and will use ketoconazole for the tinea rash    This note was completed using Dragon voice recognition software.      Roberto Sarmiento MD  General Internal Medicine  [facility]  [phone]        "

## 2023-03-28 NOTE — NURSING NOTE
"Noe Florence is a 87 year old male patient that presents today in clinic for the following:    Chief Complaint   Patient presents with     Physical     Pt here for physical.     The patient's allergies and medications were reviewed as noted. A set of vitals were recorded as noted without incident: /76 (BP Location: Right arm, Patient Position: Sitting, Cuff Size: Adult Regular)   Pulse 60   Ht 1.702 m (5' 7.01\")   SpO2 99%   BMI 23.99 kg/m  . The patient does not have any other questions for the provider.    Isabella Wynne, EMT at 2:09 PM on 3/28/2023  "

## 2023-03-28 NOTE — PROGRESS NOTES
Medicare Annual Wellness Questionnaire:  This 87 year old year old male presents for a Medicare Wellness Exam.    Fall Risk Assessment:  Have you fallen 2 or more times in the last year?  Yes     How many times were you injured due to a fall in the last year?  yes    PHQ-2:  Over the last 2 weeks, how often have you been bothered by feeling down, depressed, or hopeless?  Nearly every day (3)    Over the last 2 weeks, how often have you had little interest or pleasure in doing things?  Nearly every day (3)    Social History:  What is your marital status?  Life partner    Who lives in your household?  Life partner    Does your home have loose rugs in the hallway:     No    Does your home have grab bars in the bathroom:    Yes     Does your home have handrails on the stairs?  Yes     Does your home have poorly lit areas?    No    Do you feel threatened or controlled by a partner, ex-partner or anyone in your life?   No    Has anyone hurt you physically, for example by pushing, hitting, slapping or kicking you or forcing you to have sex?   No    Do you need help with the phone, transportation, shopping, preparing meals, housework, laundry, medications or managing money?   Yes     Sexual Health:  Are you sexually active?    No    If yes, with men, women, or both?   N/A    If yes, how many partners?  N/A    If yes, are you using condoms?    N/A    Have you had any sexually transmitted infections in the last year?   No    Do you have any sexual concerns?        Women Only:  Women: What year did you stop having periods (approximate age)?  N/A    Women: Any vaginal bleeding in the last year?    N/A    Women: Have you ever had an abnormal Pap smear?    N/A    General Health Assessment:  Have you noticed any hearing difficulties?   Yes     Do you wear hearing aids?   No    Have you seen a hearing professional such as an audiologist in the last 1 year?   No    Do you have vision difficulty?    No    Do you wear glasses or  contacts?   Yes     Have you seen an eye doctor in the last 1 year?   Yes     How many servings of fruits and vegetables do you eat a day?  Fruit: 3  Vegetables: 3    How often do you exercise in a week?  5-6    How long and what kind of exercise do you do?  Leg and arm exercise    Tobacco and Alcohol History:  Do you use tobacco/nicotine products?    No    If yes, please list the method of use and average weekly consumption?  N/A    Do you use any other drugs?   No         Do you drink alcohol?   No    If you drink alcohol, how many drinks per week?  N/A    Advanced Directive:  Have you completed an Advance Directives document?  Yes     If yes, have you given a copy to the clinic?       Do you need information on Advance Directives?   No    Isabella Wynne, EMT at 3:17 PM on 3/28/2023

## 2023-03-28 NOTE — PROGRESS NOTES
ANTICOAGULATION MANAGEMENT     Noe Florence 87 year old male is on warfarin with therapeutic INR result. (Goal INR 1.5-2.5)    Recent labs: (last 7 days)     03/28/23  1020   INR 1.5*       ASSESSMENT       Source(s): Chart Review    Previous INR was Therapeutic last 2(+) visits    Medication, diet, health changes since last INR chart reviewed; none identified             PLAN     Recommended plan for no diet, medication or health factor changes affecting INR     Dosing Instructions: Continue your current warfarin dose with next INR in 4 weeks       Summary  As of 3/28/2023    Full warfarin instructions:  5 mg every Sun, Wed, Fri; 2.5 mg all other days   Next INR check:  4/25/2023             Detailed voice message left for  friend Rica  with dosing instructions and follow up date.     Contact [phone] to schedule and with any changes, questions or concerns.     Education provided:     Please call back if any changes to your diet, medications or how you've been taking warfarin    Contact [phone] with any changes, questions or concerns.     Plan made per [facility] anticoagulation protocol    Nguyen Zuniga RN  Anticoagulation Clinic  3/28/2023    _______________________________________________________________________     Anticoagulation Episode Summary     Current INR goal:  Other - see comment   TTR:  44.9 % (1 y)   Target end date:  Indefinite   Send INR reminders to:  [facility]    Indications    Atrial fibrillation (H) [I48.91] [I48.91]  Long-term (current) use of anticoagulants [Z79.01] [Z79.01]  Deep vein thrombosis (DVT) (H) [I82.409]  Atrial fibrillation  unspecified type (H) [I48.91]           Comments:  Goal range: 1.5-2.5         Anticoagulation Care Providers     Provider Role Specialty Phone number    Roberto Sarmiento MD Referring Internal Medicine [phone]

## 2023-03-28 NOTE — PROGRESS NOTES
Addendum 3/28/23 Friend Rica calls back and denies any changes to patient's health, medication, bleeding, or clotting. i[facility]ca prefers to have patient get another INR on 4/24 with [facility]. Writer called and spoke with Brennan and gave verbal orders to draw next INR on 4/24.    Nguyen Zuniga RN

## 2023-03-31 NOTE — TELEPHONE ENCOUNTER
DIAGNOSIS: Osteoarthritis of Both knees    APPOINTMENT DATE: 4.21.23   NOTES STATUS DETAILS   OFFICE NOTE from referring provider Internal 3.28.23 Roberto Sarmiento MD  9.19.22       OFFICE NOTE from other specialist Internal 10.2.20 Benjamin Fiarchild MD   DISCHARGE REPORT from the ER Internal 3.12.19 Venus Atkins MD    11.4.17 Cristal Cox MD    6.17.14 [facility], [place] [facility]     OPERATIVE REPORT Internal 3.14.19 Srikanth Avalos MD  --Open Reduction Internal Fixation Left Femur Intramedullar Nailing      MEDICATION LIST Internal    XRAYS (IMAGES & REPORTS) Internal 9.19.22 L knee  9.17.19 L femur  4.30.19 L femur  11.4.17 L tib/fib  6.17.14 L tib/fib   6.17.14 B knee  More in epic

## 2023-04-03 NOTE — PROGRESS NOTES
ANTICOAGULATION MANAGEMENT     Noe Florence 87 year old male is on warfarin with therapeutic INR result. (Goal INR 1.5-2.5)    Recent labs: (last 7 days)     04/03/23  1424   INR 1.69*       ASSESSMENT       Source(s): Patient/Caregiver Call       Warfarin doses taken: Warfarin taken as instructed    Diet: No new diet changes identified    New illness, injury, or hospitalization: No    Medication/supplement changes: None noted    Signs or symptoms of bleeding or clotting: No    Previous INR: Therapeutic last 2(+) visits    Additional findings: None         PLAN     Recommended plan for no diet, medication or health factor changes affecting INR     Dosing Instructions: Continue your current warfarin dose with next INR in 3 weeks       Summary  As of 4/3/2023    Full warfarin instructions:  5 mg every Sun, Wed, Fri; 2.5 mg all other days   Next INR check:  4/24/2023             Telephone call with  friend Rica  who verbalizes understanding and agrees to plan and who agrees to plan and repeated back plan correctly    Orders given to [facility] ([phone])    Education provided:     None required    Plan made per [facility] anticoagulation protocol    Nguyen Zuniga RN  Anticoagulation Clinic  4/3/2023    _______________________________________________________________________     Anticoagulation Episode Summary     Current INR goal:  Other - see comment   TTR:  43.4 % (1 y)   Target end date:  Indefinite   Send INR reminders to:  [facility]    Indications    Atrial fibrillation (H) [I48.91] [I48.91]  Long-term (current) use of anticoagulants [Z79.01] [Z79.01]  Deep vein thrombosis (DVT) (H) [I82.409]  Atrial fibrillation  unspecified type (H) [I48.91]           Comments:  Goal range: 1.5-2.5         Anticoagulation Care Providers     Provider Role Specialty Phone number    Roberto Sarmiento MD Referring Internal Medicine [phone]

## 2023-04-03 NOTE — LETTER
4/3/2023         RE: Noe Florence  [address] [facility] [address]      I am seeing Sha Florence today in follow-up of colon cancer.    He is 87 years old.  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  He has had a mildly elevated CEA level ever since without evidence of disease on imaging.  He returns today for ongoing surveillance.    He has had a difficult time since I saw him last with some falls and his stay in the transitional care unit that did not go well for him.  He is currently is back at home and really is not able to get up and walk at all anymore.  His appetite has been good and he thinks his weight is stable but he has not weighed himself in quite a while.  He is moving his bowels without much difficulty and there is been no blood in his stools.  He has no focal sites of pain.  He has noted no adenopathy.    On physical exam he appears his stated age and chronically ill.  His vital signs are unremarkable.  He has no icterus.  He has no adenopathy palpable in the neck or supraclavicular spaces.  His abdomen is soft nontender without palpable mass organomegaly.    His CEA level is still pending at the time of this dictation.    Assessment/plan: History of resected colon cancer x2.  Clinically he has no evidence of disease.  He has always had a modestly elevated CEA level and assuming today's level is not further elevated on that he warrants further investigation.  Given his advanced age and significant morbidities which make it very difficult for him to get the clinic I suggested he could incorporate the remainder of his cancer surveillance with his primary care physician.  So long as a CEA level is not going up I would not do anything more than check that every 6 months to a year going forward.  He ask about whether he should have repeat colonoscopies, but I cannot imagine putting him through a colonoscopy in his current  state.  I will leave further follow-up in our clinic open for now.  Ongoing tracking of the CEA level would be intended primarily to allow us to recognize recurrence that would help in end-of-life planning rather than for the sake of any meaningful ability to treat any recurrent cancer.  We will let him know by phone once we get back his CEA level.        Francisco Gilliam MD

## 2023-04-03 NOTE — TELEPHONE ENCOUNTER
Last Clinic Visit: 3/28/2023  [facility] Internal [facility] [place]

## 2023-04-03 NOTE — LETTER
4/3/2023         RE: Noe Florence  [address] [facility] [address]        Dear Colleague,    Thank you for referring your patient, Noe Florence, to the [facility]. Please see a copy of my visit note below.    I am seeing Sha Florence today in follow-up of colon cancer.    He is 87 years old.  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  He has had a mildly elevated CEA level ever since without evidence of disease on imaging.  He returns today for ongoing surveillance.    He has had a difficult time since I saw him last with some falls and his stay in the transitional care unit that did not go well for him.  He is currently is back at home and really is not able to get up and walk at all anymore.  His appetite has been good and he thinks his weight is stable but he has not weighed himself in quite a while.  He is moving his bowels without much difficulty and there is been no blood in his stools.  He has no focal sites of pain.  He has noted no adenopathy.    On physical exam he appears his stated age and chronically ill.  His vital signs are unremarkable.  He has no icterus.  He has no adenopathy palpable in the neck or supraclavicular spaces.  His abdomen is soft nontender without palpable mass organomegaly.    His CEA level is still pending at the time of this dictation.    Assessment/plan: History of resected colon cancer x2.  Clinically he has no evidence of disease.  He has always had a modestly elevated CEA level and assuming today's level is not further elevated on that he warrants further investigation.  Given his advanced age and significant morbidities which make it very difficult for him to get the clinic I suggested he could incorporate the remainder of his cancer surveillance with his primary care physician.  So long as a CEA level is not going up I would not do anything more than check that every  6 months to a year going forward.  He ask about whether he should have repeat colonoscopies, but I cannot imagine putting him through a colonoscopy in his current state.  I will leave further follow-up in our clinic open for now.  Ongoing tracking of the CEA level would be intended primarily to allow us to recognize recurrence that would help in end-of-life planning rather than for the sake of any meaningful ability to treat any recurrent cancer.  We will let him know by phone once we get back his CEA level.        Francisco Gilliam MD

## 2023-04-03 NOTE — NURSING NOTE
"Oncology Rooming Note    April 3, 2023 2:36 PM   Noe Florence is a 87 year old male who presents for:    Chief Complaint   Patient presents with     Blood Draw     Labs drawn via  by RN. Vitals taken.     Oncology Clinic Visit     [facility] RETURN - COLON CANCER     Initial Vitals: /58 (BP Location: Right arm, Patient Position: Sitting, Cuff Size: Adult Regular)   Pulse 62   Temp 98.7  F (37.1  C) (Oral)   Resp 16   SpO2 99%  Estimated body mass index is 23.99 kg/m  as calculated from the following:    Height as of 3/28/23: 1.702 m (5' 7.01\").    Weight as of 12/26/22: 69.5 kg (153 lb 3.2 oz). There is no height or weight on file to calculate BSA.  No Pain (0) Comment: Data Unavailable   No LMP for male patient.  Allergies reviewed: Yes  Medications reviewed: Yes    Medications: Medication refills not needed today.  Pharmacy name entered into [facility]:    [facility] [place], [place] - [address]  [facility] - [place], [place] - [phone]            "

## 2023-04-03 NOTE — PROGRESS NOTES
I am seeing Sha Florence today in follow-up of colon cancer.    He is 87 years old.  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  He has had a mildly elevated CEA level ever since without evidence of disease on imaging.  He returns today for ongoing surveillance.    He has had a difficult time since I saw him last with some falls and his stay in the transitional care unit that did not go well for him.  He is currently is back at home and really is not able to get up and walk at all anymore.  His appetite has been good and he thinks his weight is stable but he has not weighed himself in quite a while.  He is moving his bowels without much difficulty and there is been no blood in his stools.  He has no focal sites of pain.  He has noted no adenopathy.    On physical exam he appears his stated age and chronically ill.  His vital signs are unremarkable.  He has no icterus.  He has no adenopathy palpable in the neck or supraclavicular spaces.  His abdomen is soft nontender without palpable mass organomegaly.    His CEA level is still pending at the time of this dictation.    Assessment/plan: History of resected colon cancer x2.  Clinically he has no evidence of disease.  He has always had a modestly elevated CEA level and assuming today's level is not further elevated on that he warrants further investigation.  Given his advanced age and significant morbidities which make it very difficult for him to get the clinic I suggested he could incorporate the remainder of his cancer surveillance with his primary care physician.  So long as a CEA level is not going up I would not do anything more than check that every 6 months to a year going forward.  He ask about whether he should have repeat colonoscopies, but I cannot imagine putting him through a colonoscopy in his current state.  I will leave further follow-up in our clinic open for now.  Ongoing tracking of the  CEA level would be intended primarily to allow us to recognize recurrence that would help in end-of-life planning rather than for the sake of any meaningful ability to treat any recurrent cancer.  We will let him know by phone once we get back his CEA level.

## 2023-04-03 NOTE — NURSING NOTE
Chief Complaint   Patient presents with     Blood Draw     Labs drawn via  by RN. Vitals taken.     Labs drawn with  by RN. Vitals taken. Patient checked into next appointment.    Annette Whitmore RN

## 2023-04-10 NOTE — TELEPHONE ENCOUNTER
ANTICOAGULATION MANAGEMENT:  Medication Refill    Anticoagulation Summary  As of 4/3/2023    Warfarin maintenance plan:  5 mg (5 mg x 1) every Sun, Wed, Fri; 2.5 mg (5 mg x 0.5) all other days   Next INR check:  4/24/2023   Target end date:  Indefinite    Indications    Atrial fibrillation (H) [I48.91] [I48.91]  Long-term (current) use of anticoagulants [Z79.01] [Z79.01]  Deep vein thrombosis (DVT) (H) [I82.409]  Atrial fibrillation  unspecified type (H) [I48.91]             Anticoagulation Care Providers     Provider Role Specialty Phone number    Roberto Sarmiento MD Referring Internal Medicine [phone]          Visit with referring provider/group within last year: Yes    [facility] referral signed within last year: Yes    Noe meets all criteria for refill (current [facility] referral, office visit with referring provider/group in last year, lab monitoring up to date or not exceeding 2 weeks overdue). Rx instructions and quantity supplied updated to match patient's current dosing plan. Warfarin 90 day supply with 1 refill granted per [facility] protocol     Isabell Miller RN  Anticoagulation Clinic

## 2023-04-17 NOTE — PROGRESS NOTES
ANTICOAGULATION MANAGEMENT:  Medication Refill    Anticoagulation Summary  As of 4/3/2023    Warfarin maintenance plan:  5 mg (5 mg x 1) every Sun, Wed, Fri; 2.5 mg (5 mg x 0.5) all other days   Next INR check:  4/24/2023   Target end date:  Indefinite    Indications    Atrial fibrillation (H) [I48.91] [I48.91]  Long-term (current) use of anticoagulants [Z79.01] [Z79.01]  Deep vein thrombosis (DVT) (H) [I82.409]  Atrial fibrillation  unspecified type (H) [I48.91]             Anticoagulation Care Providers     Provider Role Specialty Phone number    Roberto Sarmiento MD Referring Internal Medicine [phone]          Visit with referring provider/group within last year: Yes    [facility] referral signed within last year: Yes    Noe meets all criteria for refill (current [facility] referral, office visit with referring provider/group in last year, lab monitoring up to date or not exceeding 2 weeks overdue). Rx instructions and quantity supplied updated to match patient's current dosing plan. Warfarin 90 day supply with 1 refill granted per [facility] protocol     Nguyen Zuniga RN  Anticoagulation Clinic

## 2023-04-18 NOTE — TELEPHONE ENCOUNTER
Incoming call received from [place] reporting changes to health. Missed dose on Sunday, 5 mg given instead of 2.5mg yesterday. She would like him to take 5 mg again today instead of 2.5mg. Recheck INR is scheduled for 4/24/23. Patient also has a wound that started bleeding again after removing bandaid. Anticoag tracking calendar updated with dose changes and recheck INR date.     BEVERLEY LOONEY RN-BC  Anticoagulation Clinic  [phone]

## 2023-04-21 NOTE — PROGRESS NOTES
HISTORY OF PRESENT ILLNESS  Mr. Florence is a pleasant 87 year old year old male who presents to clinic today with the following:    What problem are you here for evaluation for his bilateral knee pain. He reports that his left knee is more painful than his right knee.     How long have you had this problem: Chronic but reports that his left knee has worsen over the last year.     Have you had any recent imaging of this problem? Xrays/MRI/CT scans: XR of his right knee was taken at appointment today. He has an XR of left knee from 9/19/2022.     Have you had treatments for this problem in the past?  -Medications: Continues with his current medication list.   -Physical therapy: Yes, he has been to in clinic and currently has in home physical therapy.   -Injections: His wife reports that > 3 years ago, he was seen at another facility and received injections. He does not know what type of injection or how long they helped.   -Surgery: No    How severe is this problem today? 0-10 scale: 7/10    Does this problem or its symptoms cause difficulty for you falling asleep or staying asleep: yes      MEDICAL HISTORY  Patient Active Problem List   Diagnosis     Rotator cuff (capsule) sprain     Other postprocedural status(V45.89)     Hyperkalemia     Anemia     Hypertension     CAD (coronary artery disease)     NSVT (nonsustained ventricular tachycardia) (H)     NSTEMI (non-ST elevated myocardial infarction) (H)     Automatic implantable cardioverter-defibrillator - Medtronic dual chamber ICD - Not Dependent     Skin exam, screening for cancer     Tinea cruris     Tinea corporis     Colon cancer (H)     Polymyalgia rheumatica (H)     S/P ablation of atrial flutter     Primary open angle glaucoma     Atrial fibrillation (H) [I48.91]     Long-term (current) use of anticoagulants [Z79.01]     Ischemic cardiomyopathy     CKD (chronic kidney disease), stage III (H)     Weight loss, unintentional     Skin infection     Tinea pedis of  both feet     Venous stasis dermatitis of both lower extremities     Actinic keratosis     Inflamed seborrheic keratosis     Closed left subtrochanteric femur fracture (H)     Hip fracture (H)     Other osteoporosis without current pathological fracture     Deep vein thrombosis (DVT) (H)     Other closed nondisplaced fracture of distal end of humerus, unspecified laterality, initial encounter     Syncope and collapse     Atrial fibrillation, unspecified type (H)       Current Outpatient Medications   Medication Sig Dispense Refill     acetaminophen (TYLENOL) 500 MG tablet Take 1,000 mg by mouth 2 times daily And every day PRN       alendronate (FOSAMAX) 70 MG tablet Take 1 tablet (70 mg) by mouth every 7 days Take with a full glass of water and do not eat or lay down for 30 minutes 12 tablet 3     bacitracin 500 UNIT/GM ophthalmic ointment 1 application, topical, Once A Day, Apply to open areas on right great to and right second toe and cover with dressing daily (has pressure area to toes)       calcium carbonate 500 mg, elemental, (OSCAL;OYSTER SHELL CALCIUM) 500 MG tablet Take 1 tablet (500 mg) by mouth 2 times daily 180 tablet 3     cholecalciferol 1000 units TABS Take 1,000 Units by mouth 2 times daily       COMPRESSION STOCKINGS 1 each daily 1 each 4     cyanocobalamin (VITAMIN B-12) 1000 MCG tablet Take 4 daily 400 tablet 3     diclofenac (VOLTAREN) 1 % topical gel Apply 2 g topically 4 times daily 350 g 11     Emollient (CERAVE SA RENEWING) LOTN 1 application, topical, Once A Day, Apply to torso       Emollient (CERAVE) CREA 1 application, topical, Once A Day, Apply to bilateral legs       ferrous sulfate (FE TABS) 325 (65 Fe) MG EC tablet Take 325 mg by mouth daily       ketoconazole (NIZORAL) 2 % external cream Apply topically 2 times daily 60 g 1     ketoconazole (NIZORAL) 2 % external cream Apply to affected areas on chest and feet daily as needed 60 g 6     ketoconazole (NIZORAL) 2 % external cream Apply  to the feet 2 times a day until rash clears then 3-4 times a week for maintenance 60 g 11     ketoconazole (NIZORAL) 2 % external shampoo Apply to scalp daily as needed until flaking resolves 120 mL 1     lactobacillus rhamnosus, GG, (CULTURELL) capsule Take 1 capsule by mouth 2 times daily       metoprolol tartrate (LOPRESSOR) 25 MG tablet Take 1 tablet (25 mg) by mouth 2 times daily 180 tablet 3     mirtazapine (REMERON) 15 MG tablet Take 15 mg by mouth At Bedtime.       Multiple Vitamins-Minerals (MULTIVITAMIN ADULT PO)        risperiDONE (RISPERDAL) 0.5 MG tablet Take 2 tablets by mouth daily       sennosides (SENOKOT) 8.6 MG tablet Take 1 tablet by mouth 2 times daily       sertraline (ZOLOFT) 100 MG tablet Take 200 mg by mouth every evening       tamsulosin (FLOMAX) 0.4 MG capsule TAKE 2 CAPSULES (0.8 MG) BY MOUTH DAILY 180 capsule 2     urea (GORMEL) 20 % external cream Apply topically 2 times daily Apply to bilateral heels BID       warfarin ANTICOAGULANT (JANTOVEN ANTICOAGULANT) 5 MG tablet TAKE HALF TO ONE TABLET BY MOUTH DAILY OR AS DIRECTED BY THE COUMADIN CLINIC 90 tablet 1       Allergies   Allergen Reactions     Latex Rash     Rash       Family History   Problem Relation Age of Onset     C.A.D. Father      Anesthesia Reaction No family hx of      Crohn's Disease No family hx of      Ulcerative Colitis No family hx of      Cancer - colorectal No family hx of      Macular Degeneration No family hx of      Cancer No family hx of         No known family hx of skin cancer     Melanoma No family hx of      Skin Cancer No family hx of      Social History     Socioeconomic History     Marital status:    Tobacco Use     Smoking status: Former     Types: Cigarettes, Cigars     Quit date: 1968     Years since quittin.3     Smokeless tobacco: Never   Substance and Sexual Activity     Alcohol use: Not Currently     Drug use: No       Additional medical/Social/Surgical histories reviewed in EPIC and  updated as appropriate.     REVIEW OF SYSTEMS (4/21/2023)  10 point ROS of systems including Constitutional, Eyes, Respiratory, Cardiovascular, Gastroenterology, Genitourinary, Integumentary, Musculoskeletal, Psychiatric, Allergic/Immunologic were all negative except for pertinent positives noted in my HPI.     PHYSICAL EXAM  VSS    General  - normal appearance, in no obvious distress  HEENT  - conjunctivae not injected, moist mucous membranes, normocephalic/atraumatic head, ears normal appearance, no lesions, mouth normal appearance, no scars, normal dentition and teeth present  CV  - normal popliteal pulse  Pulm  - normal respiratory pattern, non-labored  Musculoskeletal - bilateral knee  - stance: mildly antalgic gait, genu varum, needs walker for assistance for standing and walking  - inspection: no generalized swelling, trace effusion  - palpation: medial joint line tenderness, patella and patellar tendon non-tender, normal popliteal pulse  - ROM: 90 degrees flexion, limited to 15 degrees extension in both knees due to pain and stiffness, slightly painful active ROM  - strength: 5/5 in flexion, 5/5 in extension  - neuro: no sensory or motor deficit  - special tests:  (-) Lachman  (-) anterior drawer  (-) posterior drawer  (-) pivot shift  (-) varus at 0 and 30 degrees flexion  (-) valgus at 0 and 30 degrees flexion  (+) Harry s compression test  (-) patellar apprehension  Neuro  - no sensory or motor deficit, grossly normal coordination, normal muscle tone  Skin  - no ecchymosis, erythema, warmth, or induration, no obvious rash  Psych  - interactive, appropriate, normal mood and affect    ASSESSMENT & PLAN  88 yo male with bilateral knee chronic pain djd, arthritis  I independently reviewed the following imaging studies:  Bilateral knee xrays: shows bilateral djd, arthritis  After a 20 minute discussion and examination, we decided to perform a same day injection for diagnostic and therapeutic purposes for  bilateral knees  Will order hA injections for knees  Has had in the past and have helped over 80% improve his pain and stiffness in knees  Using voltaren gel PRN  Given baclofen PRN for muscle tightness/spasms  Patient HAS  completed physical therapy for 4-6 weeks  Patient has been doing home exercise physical therapy program for this problem      Appropriate PPE was utilized for prevention of spread of Covid-19.  Lauri Razo MD, CAM    PROCEDURE:      bilateral      Knee joint injection   The patient was apprised of the risks and the benefits of the procedure and consented and a written consent was signed.   The patient s  KNEEs were sterilely prepped with chloraprep.   40 mg of triamcinolone suspension was drawn up into a 5 mL syringe with 4 mL of 1% lidocaine for each knee  The patient was injected into the R and L knee with a 1.5-inch 22-gauge needle at the_superiorlateral aspect of their knee joints  There were no complications. The patient tolerated the procedure well. There was minimal bleeding.   The patient was instructed to ice the knees upon leaving clinic and refrain from overuse over the next 3 days.   The patient was instructed to go to the emergency room with any usual pain, swelling, or redness occurred in the injected area.   The patient was given a followup appointment to evaluate response to the injection to their increased range of motion and reduction of pain.  Follow up PRN  Dr Lauri Razo    Large Joint Injection: bilateral knee    Date/Time: 4/21/2023 2:17 PM    Performed by: Lauri Razo MD  Authorized by: Lauri Razo MD    Indications:  Pain, osteoarthritis and diagnostic evaluation  Needle Size comment:  23G  Guidance: landmark guided    Approach:  Superolateral  Location:  Knee  Laterality:  Bilateral      Medications (Right):  40 mg triamcinolone 40 MG/ML; 4 mL lidocaine (PF) 1 %  Medications (Left):  40 mg triamcinolone 40 MG/ML; 4 mL lidocaine (PF) 1  %  Outcome:  Tolerated well, no immediate complications  Procedure discussed: discussed risks, benefits, and alternatives    Consent Given by:  Patient  Timeout: timeout called immediately prior to procedure    Prep: patient was prepped and draped in usual sterile fashion

## 2023-04-21 NOTE — NURSING NOTE
[address] [address]  Dept: [phone]  ______________________________________________________________________________    Patient: Noe Florence   : 1935   MRN: [id number]   2023    INVASIVE PROCEDURE SAFETY CHECKLIST    Date: 2023   Procedure: bilateral knee joint injection with kenalog and USG  Patient Name: Noe Florence  MRN: [id number]  YOB: 1935    Action: Complete sections as appropriate. Any discrepancy results in a HARD COPY until resolved.     PRE PROCEDURE:  Patient ID verified with 2 identifiers (name and  or MRN): Yes  Procedure and site verified with patient/designee (when able): Yes  Accurate consent documentation in medical record: Yes  H&P (or appropriate assessment) documented in medical record: Yes  H&P must be up to 20 days prior to procedure and updates within 24 hours of procedure as applicable: NA  Relevant diagnostic and radiology test results appropriately labeled and displayed as applicable: NA  Procedure site(s) marked with provider initials: NA    TIMEOUT:  Time-Out performed immediately prior to starting procedure, including verbal and active participation of all team members addressing the following:Yes  * Correct patient identify  * Confirmed that the correct side and site are marked  * An accurate procedure consent form  * Agreement on the procedure to be done  * Correct patient position  * Relevant images and results are properly labeled and appropriately displayed  * The need to administer antibiotics or fluids for irrigation purposes during the procedure as applicable   * Safety precautions based on patient history or medication use    DURING PROCEDURE: Verification of correct person, site, and procedures any time the responsibility for care of the patient is transferred to another member of the care team.       Prior to injection, verified patient identity using  patient's name and date of birth.  Due to injection administration, patient instructed to remain in clinic for 15 minutes  afterwards, and to report any adverse reaction to me immediately.    Joint injection was performed.      Lido x2  Drug Amount Wasted:  Yes: 1 mg/ml   Vial/Syringe: Single dose vial  Expiration Date:  12/01/2026    Kenalog  Drug Amount Wasted: No  Vial/Syringe: Single dose vial  Expiration Date:  12/01/2024    Felipa Sanders, ATC  April 21, 2023

## 2023-04-21 NOTE — LETTER
4/21/2023      RE: Noe Florence  [address] [facility] [address]     Dear Colleague,    Thank you for referring your patient, Noe Florence, to the [facility] [place]. Please see a copy of my visit note below.    HISTORY OF PRESENT ILLNESS  Mr. Florence is a pleasant 87 year old year old male who presents to clinic today with the following:    What problem are you here for evaluation for his bilateral knee pain. He reports that his left knee is more painful than his right knee.     How long have you had this problem: Chronic but reports that his left knee has worsen over the last year.     Have you had any recent imaging of this problem? Xrays/MRI/CT scans: XR of his right knee was taken at appointment today. He has an XR of left knee from 9/19/2022.     Have you had treatments for this problem in the past?  -Medications: Continues with his current medication list.   -Physical therapy: Yes, he has been to in clinic and currently has in home physical therapy.   -Injections: His wife reports that > 3 years ago, he was seen at another facility and received injections. He does not know what type of injection or how long they helped.   -Surgery: No    How severe is this problem today? 0-10 scale: 7/10    Does this problem or its symptoms cause difficulty for you falling asleep or staying asleep: yes      MEDICAL HISTORY  Patient Active Problem List   Diagnosis    Rotator cuff (capsule) sprain    Other postprocedural status(V45.89)    Hyperkalemia    Anemia    Hypertension    CAD (coronary artery disease)    NSVT (nonsustained ventricular tachycardia) (H)    NSTEMI (non-ST elevated myocardial infarction) (H)    Automatic implantable cardioverter-defibrillator - Medtronic dual chamber ICD - Not Dependent    Skin exam, screening for cancer    Tinea cruris    Tinea corporis    Colon cancer (H)    Polymyalgia rheumatica (H)    S/P ablation of atrial flutter    Primary open angle  glaucoma    Atrial fibrillation (H) [I48.91]    Long-term (current) use of anticoagulants [Z79.01]    Ischemic cardiomyopathy    CKD (chronic kidney disease), stage III (H)    Weight loss, unintentional    Skin infection    Tinea pedis of both feet    Venous stasis dermatitis of both lower extremities    Actinic keratosis    Inflamed seborrheic keratosis    Closed left subtrochanteric femur fracture (H)    Hip fracture (H)    Other osteoporosis without current pathological fracture    Deep vein thrombosis (DVT) (H)    Other closed nondisplaced fracture of distal end of humerus, unspecified laterality, initial encounter    Syncope and collapse    Atrial fibrillation, unspecified type (H)       Current Outpatient Medications   Medication Sig Dispense Refill    acetaminophen (TYLENOL) 500 MG tablet Take 1,000 mg by mouth 2 times daily And every day PRN      alendronate (FOSAMAX) 70 MG tablet Take 1 tablet (70 mg) by mouth every 7 days Take with a full glass of water and do not eat or lay down for 30 minutes 12 tablet 3    bacitracin 500 UNIT/GM ophthalmic ointment 1 application, topical, Once A Day, Apply to open areas on right great to and right second toe and cover with dressing daily (has pressure area to toes)      calcium carbonate 500 mg, elemental, (OSCAL;OYSTER SHELL CALCIUM) 500 MG tablet Take 1 tablet (500 mg) by mouth 2 times daily 180 tablet 3    cholecalciferol 1000 units TABS Take 1,000 Units by mouth 2 times daily      COMPRESSION STOCKINGS 1 each daily 1 each 4    cyanocobalamin (VITAMIN B-12) 1000 MCG tablet Take 4 daily 400 tablet 3    diclofenac (VOLTAREN) 1 % topical gel Apply 2 g topically 4 times daily 350 g 11    Emollient (CERAVE SA RENEWING) LOTN 1 application, topical, Once A Day, Apply to torso      Emollient (CERAVE) CREA 1 application, topical, Once A Day, Apply to bilateral legs      ferrous sulfate (FE TABS) 325 (65 Fe) MG EC tablet Take 325 mg by mouth daily      ketoconazole (NIZORAL) 2  % external cream Apply topically 2 times daily 60 g 1    ketoconazole (NIZORAL) 2 % external cream Apply to affected areas on chest and feet daily as needed 60 g 6    ketoconazole (NIZORAL) 2 % external cream Apply to the feet 2 times a day until rash clears then 3-4 times a week for maintenance 60 g 11    ketoconazole (NIZORAL) 2 % external shampoo Apply to scalp daily as needed until flaking resolves 120 mL 1    lactobacillus rhamnosus, GG, (CULTURELL) capsule Take 1 capsule by mouth 2 times daily      metoprolol tartrate (LOPRESSOR) 25 MG tablet Take 1 tablet (25 mg) by mouth 2 times daily 180 tablet 3    mirtazapine (REMERON) 15 MG tablet Take 15 mg by mouth At Bedtime.      Multiple Vitamins-Minerals (MULTIVITAMIN ADULT PO)       risperiDONE (RISPERDAL) 0.5 MG tablet Take 2 tablets by mouth daily      sennosides (SENOKOT) 8.6 MG tablet Take 1 tablet by mouth 2 times daily      sertraline (ZOLOFT) 100 MG tablet Take 200 mg by mouth every evening      tamsulosin (FLOMAX) 0.4 MG capsule TAKE 2 CAPSULES (0.8 MG) BY MOUTH DAILY 180 capsule 2    urea (GORMEL) 20 % external cream Apply topically 2 times daily Apply to bilateral heels BID      warfarin ANTICOAGULANT (JANTOVEN ANTICOAGULANT) 5 MG tablet TAKE HALF TO ONE TABLET BY MOUTH DAILY OR AS DIRECTED BY THE COUMADIN CLINIC 90 tablet 1       Allergies   Allergen Reactions    Latex Rash     Rash       Family History   Problem Relation Age of Onset    C.A.D. Father     Anesthesia Reaction No family hx of     Crohn's Disease No family hx of     Ulcerative Colitis No family hx of     Cancer - colorectal No family hx of     Macular Degeneration No family hx of     Cancer No family hx of         No known family hx of skin cancer    Melanoma No family hx of     Skin Cancer No family hx of      Social History     Socioeconomic History    Marital status:    Tobacco Use    Smoking status: Former     Types: Cigarettes, Cigars     Quit date: 1/1/1968     Years since  quittin.3    Smokeless tobacco: Never   Substance and Sexual Activity    Alcohol use: Not Currently    Drug use: No       Additional medical/Social/Surgical histories reviewed in EPIC and updated as appropriate.     REVIEW OF SYSTEMS (2023)  10 point ROS of systems including Constitutional, Eyes, Respiratory, Cardiovascular, Gastroenterology, Genitourinary, Integumentary, Musculoskeletal, Psychiatric, Allergic/Immunologic were all negative except for pertinent positives noted in my HPI.     PHYSICAL EXAM  VSS    General  - normal appearance, in no obvious distress  HEENT  - conjunctivae not injected, moist mucous membranes, normocephalic/atraumatic head, ears normal appearance, no lesions, mouth normal appearance, no scars, normal dentition and teeth present  CV  - normal popliteal pulse  Pulm  - normal respiratory pattern, non-labored  Musculoskeletal - bilateral knee  - stance: mildly antalgic gait, genu varum, needs walker for assistance for standing and walking  - inspection: no generalized swelling, trace effusion  - palpation: medial joint line tenderness, patella and patellar tendon non-tender, normal popliteal pulse  - ROM: 90 degrees flexion, limited to 15 degrees extension in both knees due to pain and stiffness, slightly painful active ROM  - strength: 5/5 in flexion, 5/5 in extension  - neuro: no sensory or motor deficit  - special tests:  (-) Lachman  (-) anterior drawer  (-) posterior drawer  (-) pivot shift  (-) varus at 0 and 30 degrees flexion  (-) valgus at 0 and 30 degrees flexion  (+) Harry s compression test  (-) patellar apprehension  Neuro  - no sensory or motor deficit, grossly normal coordination, normal muscle tone  Skin  - no ecchymosis, erythema, warmth, or induration, no obvious rash  Psych  - interactive, appropriate, normal mood and affect    ASSESSMENT & PLAN  88 yo male with bilateral knee chronic pain djd, arthritis  I independently reviewed the following imaging  studies:  Bilateral knee xrays: shows bilateral djd, arthritis  After a 20 minute discussion and examination, we decided to perform a same day injection for diagnostic and therapeutic purposes for bilateral knees  Will order Kirk injections for knees  Has had in the past and have helped over 80% improve his pain and stiffness in knees  Using voltaren gel PRN  Given baclofen PRN for muscle tightness/spasms  Patient HAS  completed physical therapy for 4-6 weeks  Patient has been doing home exercise physical therapy program for this problem      Appropriate PPE was utilized for prevention of spread of Covid-19.  Lauri Razo MD, CAM    PROCEDURE:      bilateral      Knee joint injection   The patient was apprised of the risks and the benefits of the procedure and consented and a written consent was signed.   The patient s  KNEEs were sterilely prepped with chloraprep.   40 mg of triamcinolone suspension was drawn up into a 5 mL syringe with 4 mL of 1% lidocaine for each knee  The patient was injected into the R and L knee with a 1.5-inch 22-gauge needle at the_superiorlateral aspect of their knee joints  There were no complications. The patient tolerated the procedure well. There was minimal bleeding.   The patient was instructed to ice the knees upon leaving clinic and refrain from overuse over the next 3 days.   The patient was instructed to go to the emergency room with any usual pain, swelling, or redness occurred in the injected area.   The patient was given a followup appointment to evaluate response to the injection to their increased range of motion and reduction of pain.  Follow up PRN  Dr Lauri Razo    Large Joint Injection: bilateral knee    Date/Time: 4/21/2023 2:17 PM    Performed by: Lauri Razo MD  Authorized by: Lauri Razo MD    Indications:  Pain, osteoarthritis and diagnostic evaluation  Needle Size comment:  23G  Guidance: landmark guided    Approach:   Superolateral  Location:  Knee  Laterality:  Bilateral      Medications (Right):  40 mg triamcinolone 40 MG/ML; 4 mL lidocaine (PF) 1 %  Medications (Left):  40 mg triamcinolone 40 MG/ML; 4 mL lidocaine (PF) 1 %  Outcome:  Tolerated well, no immediate complications  Procedure discussed: discussed risks, benefits, and alternatives    Consent Given by:  Patient  Timeout: timeout called immediately prior to procedure    Prep: patient was prepped and draped in usual sterile fashion              Again, thank you for allowing me to participate in the care of your patient.      Sincerely,    Lauri Razo MD

## 2023-04-24 NOTE — PROGRESS NOTES
Left message for Sha's significant other Rica informing her that [facility] will be out tomorrow to check INR instead of today as previously planned and that Sha should continue maintenance dose of warfarin tonight.     Praveen Estrada RN

## 2023-04-24 NOTE — PROGRESS NOTES
Cathie called stating that patient will not be able to be drawn until 05/01 due to staffing issues. Routing to managing [facility]. Please call back [facility] if this is an issue and reach out to pt.

## 2023-04-24 NOTE — PROGRESS NOTES
RAJESHI:    Received a VM from Cathie from [facility]. They will do INR tomorrow rather than today due to Phlebotomist illness.    Peggy Johnson, RN  Anticoagulation Nurse - [facility], [place]       PROGRESS NOTE  Patient is a 81y old  Male who presents with a chief complaint of UNRESPONSIVENESS (01 Aug 2020 10:05)  Chart and available morning labs /imaging are reviewed electronically , urgent issues addressed . More information  is being added upon completion of rounds , when more information is collected and management discussed with consultants , medical staff and social service/case management on the floor   OVERNIGHT  No acute overnight events. Plan for extubation today.  HPI:  80yo Male PMHx CVA 2012, HTN, HLD , hernia surgery with testicle resection 2015, umbilical hernia repair 12/2019,  transferred from [facility] ED to [place] ED with s/p seizure activity  and bradycardia into the 30s.  Arrived to [facility] hypothermic and hypotensive .Patient  required intubation ph 7.1 with CO2 85 due to acute hypoxic respiratory failure on arrival to ER ,placed on Levophed drip due to hypotension Admitted for septic workup and evaluation ,send blood and urine cx, serial lactate levels, monitor vitals closely hydration ,monitor urine output and renal profile,iv abx initiated -given ceftriaxone in ER and seen by ID consult .Pulmonology and cardiology consults are called .Patient admitted  to ICU with septic shock ,noted to have  tongue bite and AMS ,neurology evaluation called  Admitted  to intensive care  unit for monitoring , to rule out ami -send 3 sets of cardiac enzymes to rule out acute coronary event, obtain ECHO to evaluate LVEF, cardiology consult  ,continue current sepsis management, ventilator management /O2 supply, anticoagulation plan as per cardiology consult Palliative care consult requested ,to discuss advance directives and complete MOLST .Tried to reach family to obtain details of past medical hx - left a message for daughter .not able to reach son ( non valid number )[facility]  ED team spoke with patient's daughter, who stated her father fell to the ground on the floor of his motel room about 4 days ago and was unable to get up. She had been caring for him on the floor since and did not call 911 for assistance. Today she found him unresponsive, with blood in his mouth, and incontinent of urine.  CT brain was negative. A CODE STROKE was called, and a CTA head/neck were performed, which was negative for large vessel occlusion but concerning for a density anterior to the ascending aorta possibly right atrial thrombus. Also revealed enlarged, multinodular thyroid. TPA criteria was not met, as patient had unknown onset of symptoms. Patient was transferred to [facility] ED for further care .He developed  multiple witnessed seizures in [facility] ED, temporarily resolved with Ativan administration . CT/CTA brain NAD . Seen by neurologist and EEG ordered to r/o status epilepticus as etiology of ongoing encephalopathy, probably will require  MRI once stabilized from cardiac and respiratory standpoint. (28 Jul 2020 10:57)  PAST MEDICAL & SURGICAL HISTORY:  Chronic GERD  HTN (hypertension)  HLD (hyperlipidemia)  CVA (cerebral vascular accident)  High cholesterol  HTN (hypertension)  History of surgical removal of testicle  H/O hernia repair  No significant past surgical history      MEDICATIONS  (STANDING):  ALBUTerol    90 MICROgram(s) HFA Inhaler 4 Puff(s) Inhalation every 6 hours  aspirin  chewable 81 milliGRAM(s) Oral daily  atorvastatin 10 milliGRAM(s) Oral at bedtime  carvedilol 6.25 milliGRAM(s) Oral every 12 hours  chlorhexidine 0.12% Liquid 15 milliLiter(s) Oral Mucosa every 12 hours  dexMEDEtomidine Infusion 0.2 MICROgram(s)/kG/Hr (6.45 mL/Hr) IV Continuous <Continuous>  furosemide   Injectable 60 milliGRAM(s) IV Push daily  heparin   Injectable 5000 Unit(s) SubCutaneous every 8 hours  hydrALAZINE 50 milliGRAM(s) Oral every 8 hours  ipratropium 17 MICROgram(s) HFA Inhaler 4 Puff(s) Inhalation every 6 hours  levETIRAcetam  IVPB 500 milliGRAM(s) IV Intermittent every 12 hours  pantoprazole  Injectable 40 milliGRAM(s) IV Push daily  polyethylene glycol 3350 17 Gram(s) Oral two times a day  QUEtiapine 25 milliGRAM(s) Oral at bedtime  senna 2 Tablet(s) Oral at bedtime    MEDICATIONS  (PRN):      OBJECTIVE    T(C): 37 (08-01-20 @ 07:39), Max: 37 (07-31-20 @ 16:00)  HR: 59 (08-01-20 @ 14:00) (45 - 97)  BP: 151/66 (08-01-20 @ 14:00) (115/58 - 152/72)  RR: 33 (08-01-20 @ 10:00) (17 - 33)  SpO2: 94% (08-01-20 @ 14:00) (92% - 100%)  Wt(kg): --  I&O's Summary    31 Jul 2020 07:01  -  01 Aug 2020 07:00  --------------------------------------------------------  IN: 2197.4 mL / OUT: 4635 mL / NET: -2437.6 mL    01 Aug 2020 07:01  -  01 Aug 2020 14:48  --------------------------------------------------------  IN: 272.8 mL / OUT: 960 mL / NET: -687.2 mL          ROS is unobtainable due to current status PATIENT  IS UNABLE TO GIVE ANY/RELIABLE HISTORY OR REVIEW OF SYSTEMS PLEASE ALSO SEE UNDER HPI AND ASSESSMENT FOR OBTAINED REVIEW OF SYSTEMS   General: NAD  HEENT: ET tube in place  Neck: No JVD, no carotid bruit  Lungs: CTAB  CV: RRR, nl S1/S2, no M/R/G  Abdomen: S/NT/ND, +BS  Extremities: 2+ LE edema, no cyanosis  Neuro: Non-focal  Skin: No rash  LABS:                        8.8    5.89  )-----------( 142      ( 01 Aug 2020 05:35 )             28.5     08-01    145  |  106  |  28<H>  ----------------------------<  111<H>  3.9   |  35<H>  |  1.40<H>    Ca    9.0      01 Aug 2020 05:35  Phos  3.2     08-01  Mg     2.2     08-01    TPro  6.6  /  Alb  2.8<L>  /  TBili  2.3<H>  /  DBili  x   /  AST  38<H>  /  ALT  62  /  AlkPhos  68  08-01    CAPILLARY BLOOD GLUCOSE              Culture - Sputum (collected 31 Jul 2020 12:07)  Source: .Sputum Sputum  Gram Stain (31 Jul 2020 15:15):    Moderate polymorphonuclear leukocytes per low power field    No Squamous epithelial cells per low power field    Rare Yeast like cells per oil power field  Preliminary Report (01 Aug 2020 11:25):    Normal Respiratory Jumana present    Culture - Urine (collected 29 Jul 2020 01:14)  Source: .Urine Clean Catch (Midstream)  Final Report (30 Jul 2020 04:38):    No growth    Culture - Blood (collected 28 Jul 2020 16:18)  Source: .Blood Blood-Peripheral  Preliminary Report (29 Jul 2020 17:01):    No growth to date.    Culture - Blood (collected 28 Jul 2020 16:18)  Source: .Blood Blood-Peripheral  Preliminary Report (29 Jul 2020 17:01):    No growth to date.      RADIOLOGY & ADDITIONAL TESTS:< from: MR Head No Cont (07.30.20 @ 18:06) >  EXAM:  MR BRAIN                            PROCEDURE DATE:  07/30/2020          INTERPRETATION:  CLINICAL INDICATION: Weakness, lethargy, speech abnormality. Evaluate for CVA.    TECHNIQUE: Multi-planar multi-sequential MR imaging of the brain was performed without intravenous contrast.    COMPARISON: CT head, CT angiography head and neck 7/28/2020.    FINDINGS:    MRI BRAIN:    There is gliosis and encephalomalacia in the right occipital lobe with associated ex-vacuo dilatation of the occipital horn of right lateral ventricle, findings consistent with chronic infarct in the right PCA vascular territory. There is also cortically-based encephalomalacia/gliosis with associated hemosiderin deposition and chronic blood products in the right posterior frontal lobe and right perirolandic region, findings consistent with small chronic infarct in the right distal MCA vascular territory. There are chronic lacunar infarcts in the right subinsular white matter and in the cerebellar hemispheres bilaterally. There are scattered and patchy T2/FLAIR hyperintense changes in the periventricular and subcortical white matter and also in the central bonnie, nonspecific finding, but most likely representing sequelae of moderately severe chronic microvascular ischemic disease.    There is diffuse cortical sulcal prominence related to underlying brain parenchymal volume.    No acute infarction, intracranial hemorrhage or mass. There is no evidence of obstructive hydrocephalus. There are no extra-axial fluid collections.    The visualized skull base flow voids appear patent.    There is an enlarged, partially empty sella turcica.    The visualized intraorbital contents are normal. There are scattered mild mucosal inflammatory changes of the ethmoid air cells and maxillary sinuses. There are tiny polyps versus mucus retention cysts in the bilateral maxillary sinuses. The mastoid air cells are grossly clear. The visualized soft tissues and osseous structures appear unremarkable.    IMPRESSION:    No acute intracranial findings.    Chronic infarct in the right occipital lobe (right PCA territory). Cortically-based chronic infarcts in the right posterior frontal lobe and right perirolandic region (right distal MCA territory). Chronic lacunar infarcts in the right subinsular white matter and cerebellar hemispheres bilaterally.    < end of copied text >     reviewed elctronically  ASSESSMENT/PLAN:

## 2023-05-01 NOTE — TELEPHONE ENCOUNTER
Received message [facility] called and can not go see till Wednesday-tracking calendar updated. Patient's friend Rica updated.     BEVERLEY LOONEY RN-BC  Anticoagulation Clinic  [phone]

## 2023-05-03 NOTE — PROGRESS NOTES
ANTICOAGULATION MANAGEMENT     Noe Florence 87 year old male is on warfarin with therapeutic INR result. (Goal INR Other -1.5-2.5)    Recent labs: (last 7 days)     05/03/23  0000   INR 1.6*       ASSESSMENT       Source(s): Chart Review and Patient/Caregiver Call       Warfarin doses taken: Warfarin taken as instructed    Diet: No new diet changes identified    Medication/supplement changes: None noted    New illness, injury, or hospitalization: No    Signs or symptoms of bleeding or clotting: No    Previous result: Therapeutic last 2(+) visits    Additional findings: Updated [facility] with correct fax to [facility]         PLAN     Recommended plan for no diet, medication or health factor changes affecting INR     Dosing Instructions: Continue your current warfarin dose with next INR in 2 weeks       Summary  As of 5/3/2023    Full warfarin instructions:  5 mg every Sun, Wed, Fri; 2.5 mg all other days   Next INR check:  5/17/2023             Telephone call with  Rica who verbalizes understanding and agrees to plan and who agrees to plan and repeated back plan correctly    Orders given to [facility] ([phone])    Education provided:     Taking warfarin: Importance of taking warfarin as instructed    Goal range and lab monitoring: goal range and significance of current result and Importance of therapeutic range    Plan made per [facility] anticoagulation protocol    Roya Manning RN  Anticoagulation Clinic  5/3/2023    _______________________________________________________________________     Anticoagulation Episode Summary     Current INR goal:  Other - see comment   TTR:  43.5 % (1 y)   Target end date:  Indefinite   Send INR reminders to:  [facility]    Indications    Atrial fibrillation (H) [I48.91] [I48.91]  Long-term (current) use of anticoagulants [Z79.01] [Z79.01]  Deep vein thrombosis (DVT) (H) [I82.409]  Atrial fibrillation  unspecified type (H) [I48.91]           Comments:  Goal  range: 1.5-2.5         Anticoagulation Care Providers     Provider Role Specialty Phone number    Roberto Sarmiento MD Referring Internal Medicine [phone]

## 2023-05-03 NOTE — PROGRESS NOTES
Incoming fax from [facility]    Result date: 05/03/2023   INR: 1.6    Please update [facility] with correct fax number. Thanks!

## 2023-05-09 NOTE — PROGRESS NOTES
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 He has an known IgM MGUS.    Interval history:  I last saw him 4/26/22. About a year ago he took a fall. His gait has slowly worsened over the last year. He spent some time in a transitional care setting because he was having more trouble walking at home. At the transitional care facility he spent most of the time in a wheelchair. His family found this to only make his mobility worse and so they brought him home. At this point he can stand for a few seconds and take a couple steps with assistance, but otherwise can not walk. He is reporting pain in his right ankle when he stands. He also reports pain and injections in his knees. He has a wound on his right foot. He has not done any PT since January.     Prior pertinent laboratory work-up:  4/27/22: IgM 134  3/21: Serum IF showed monoclonal IgM kappa 150, last checked 03/2021.   3/19: Serum IF showed monoclonal IgM kappa. IgM 111.   3/18: MAG negative. Urine IF showed no monoclonal protein.   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 IgM 113.     Prior pertinent radiology work-up:  2015: CT LS spine showed degenerative changes resulting in mild to moderate spinal canal narrowing from L2-L5.    Prior electrophysiologic work-up:  6/14: Nerve conduction studies/EMG performed at [facility] showed a left common peroneal neuropathy vs L5 radiculopathy. 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   4/18: NCS/EMG showed multiple findings. See report for details.      Past Medical History:   Past Medical History:   Diagnosis Date     Advanced open-angle glaucoma      Atrial fibrillation (H)      CKD (chronic kidney disease), stage III (H) 2005     Colon cancer (H)     Stage II-B  colon cancer     Coronary artery disease     s/p CABG x 2, JEREMY x 2     Diverticulitis      Hyperlipidemia      Hypertension      Ischemic cardiomyopathy      MGUS (monoclonal gammopathy of unknown significance)      Nonsenile cataract     BE     Osteoporosis     left hip fracture     Polymorphic ventricular tachycardia (H)      Polymyalgia rheumatica (H)      PVD (posterior vitreous detachment), both eyes 2005     S/P ablation of atrial flutter 6/20/14    CTI     Stented coronary artery      SVT (supraventricular tachycardia) (H)     PPM/AICD for NSVT     Upper leg DVT (deep venous thromboembolism), chronic (H)     Left     Weight loss, unintentional 2017    15 lb in 4 months     Past Surgical History:  Past Surgical History:   Procedure Laterality Date     CATARACT IOL, RT/LT Bilateral      COLONOSCOPY  3/13/2014    Procedure: COMBINED COLONOSCOPY, SINGLE BIOPSY/POLYPECTOMY BY BIOPSY;;  Surgeon: Mary Gerber MD;  Location:  GI     COLONOSCOPY N/A 6/22/2015    Procedure: COLONOSCOPY;  Surgeon: Marilin Newman MD;  Location:  GI     COLONOSCOPY N/A 11/7/2018    Procedure: COMBINED COLONOSCOPY, SINGLE OR MULTIPLE BIOPSY/POLYPECTOMY BY BIOPSY;  Surgeon: Roberto Esteban MD;  Location:  GI     EP ICD GENERATOR REPLACEMENT DUAL N/A 12/11/2018    Procedure: EP ICD Generator Change Dual;  Surgeon: Deedee Baird MD;  Location:  HEART CARDIAC CATH LAB     H ABLATION ATRIAL FLUTTER       HEART CATH DRUG ELUTING STENT PLACEMENT  4/19/2012    JEREMY x 2 to LCx     IMPLANT IMPLANTABLE CARDIOVERTER DEFIBRILLATOR  5-    ppm/aicd     KNEE SURGERY      right and left knee surgeries     LAPAROSCOPIC ASSISTED COLECTOMY Right 2/1/2019    Procedure: Laparoscopic Converted to Open Transverse Colectomy with Lysis of Adhesions;  Surgeon: Chanelle Guzmán MD;  Location:  OR     LAPAROSCOPIC ASSISTED COLECTOMY LEFT (DESCENDING)  4/8/2014    Procedure: LAPAROSCOPIC ASSISTED COLECTOMY LEFT  (DESCENDING);  Hand assisted Laparoscopic Sigmoid Colectomy , Laparoscopic mobilization of spleenic flexure *Latex Allergy*Anesthesia General with Block;  Surgeon: Chanelle Guzmán MD;  Location: [facility] OR     OPEN REDUCTION INTERNAL FIXATION RODDING INTRAMEDULLAR FEMUR FRACTURE TABLE Left 3/14/2019    Procedure: Open Reduction Internal Fixation Left Femur Intramedullar Nailing;  Surgeon: Srikanth Avalos MD;  Location: [facility] OR     REPAIR VALVE MITRAL  2006     30-mm Medtronic Julian ring      SELECTIVE LASER TRABECULOPLASTY (SLT) OD (RIGHT EYE)  4/10, 1/12,+1/9/13    RE     SELECTIVE LASER TRABECULOPLASTY (SLT) OS (LEFT EYE)  5/2012     SHOULDER SURGERY      right rotator cuff     ZZC CABG, ARTERY-VEIN, FOUR  2006    LIMA-LAD, SVG-Rt PDA, SVG-OM2, SVG-Diag 1     ZZC CABG, VEIN, SINGLE  1994    1-vessel CABG, SVG->PDRCA      Family history:    There is no known family history of hereditary neuropathies or other neuromuscular disorders.    Social History:    He denies tobacco, alcohol, or illicit drug use.     Medical Allergies:     Allergies   Allergen Reactions     Latex Rash     Rash     Current Medications:   Current Outpatient Medications   Medication     acetaminophen (TYLENOL) 500 MG tablet     alendronate (FOSAMAX) 70 MG tablet     bacitracin 500 UNIT/GM ophthalmic ointment     baclofen (LIORESAL) 10 MG tablet     calcium carbonate 500 mg, elemental, (OSCAL;OYSTER SHELL CALCIUM) 500 MG tablet     cholecalciferol 1000 units TABS     COMPRESSION STOCKINGS     cyanocobalamin (VITAMIN B-12) 1000 MCG tablet     diclofenac (VOLTAREN) 1 % topical gel     Emollient (CERAVE SA RENEWING) LOTN     Emollient (CERAVE) CREA     ferrous sulfate (FE TABS) 325 (65 Fe) MG EC tablet     ketoconazole (NIZORAL) 2 % external cream     ketoconazole (NIZORAL) 2 % external cream     ketoconazole (NIZORAL) 2 % external cream     ketoconazole (NIZORAL) 2 % external shampoo     lactobacillus rhamnosus, GG, (CULTURELL)  capsule     metoprolol tartrate (LOPRESSOR) 25 MG tablet     mirtazapine (REMERON) 15 MG tablet     Multiple Vitamins-Minerals (MULTIVITAMIN ADULT PO)     risperiDONE (RISPERDAL) 0.5 MG tablet     sennosides (SENOKOT) 8.6 MG tablet     sertraline (ZOLOFT) 100 MG tablet     tamsulosin (FLOMAX) 0.4 MG capsule     urea (GORMEL) 20 % external cream     warfarin ANTICOAGULANT (JANTOVEN ANTICOAGULANT) 5 MG tablet     Current Facility-Administered Medications   Medication     lidocaine (PF) (XYLOCAINE) 1 % injection 4 mL     lidocaine (PF) (XYLOCAINE) 1 % injection 4 mL     triamcinolone (KENALOG-40) injection 40 mg     triamcinolone (KENALOG-40) injection 40 mg     Review of Systems: A review of systems was obtained and was negative except for what was noted above.    Physical examination:    /58 (BP Location: Right arm, Patient Position: Sitting, Cuff Size: Adult Regular)   Pulse 62   Temp 97.3  F (36.3  C) (Temporal)     General Appearance: NAD    Neurologic examination:    Mental status:  Patient is alert, attentive. Language was not formally assessed and spontaneous speech is limited. He is able to comply with verbal commands and does respond appropriately to questions.  Bilateral grasp reflex is present.      Cranial nerves:  Extraocular movements were full. There was no face, jaw, palate or tongue weakness or atrophy.      Motor exam: Due to limited comprehension formal strength assessment is difficult. He has left dorsiflexion weakness, which is similar to when I saw him last. Other lower limb muscles are thiin and deconditioned, but probably strength as expected for age and other factors.     Complex motor skills: Mild postural hand tremor.      Sensory exam: Pin decreased in left peroneal distribution. Vibration reduced in toes > ankles, but not absent.    Gait: unable to assess     Deep tendon reflexes:   Right Left   Triceps 2 2   Biceps 2 2   Brachioradialis 2 2   Knee jerk 0 0   Ankle jerk 0 0      Assessment:    Multifactorial gait disorder with contributions from age, neuropathy, left peroneal neuropathy and arthritis.     Plan:      1. IgM MGUS: IgM 4/27/22 was 134 (normal). Will repeat IgM for surveillance today.   2. Follow up with podiatry for foot hygeine  3. Balance: Referral to physical therapy  4. Knee osteoarthritis: Follow up Dr. Razo  5. Peroneal neuropathy: Avoid pressure to posterior lateral knee. Peroneal neuropathy is stable.   6. Follow up in about 12 months. Sooner if needed.   ---

## 2023-05-09 NOTE — LETTER
5/9/2023       RE: Noe Florence  [address] [place] [address]       Dear Colleague,    Thank you for referring your patient, Noe Florence, to the [facility] at [facility]. Please see a copy of my visit note below.    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 He has an known IgM MGUS.    Interval history:  I last saw him 4/26/22. About a year ago he took a fall. His gait has slowly worsened over the last year. He spent some time in a transitional care setting because he was having more trouble walking at home. At the transitional care facility he spent most of the time in a wheelchair. His family found this to only make his mobility worse and so they brought him home. At this point he can stand for a few seconds and take a couple steps with assistance, but otherwise can not walk. He is reporting pain in his right ankle when he stands. He also reports pain and injections in his knees. He has a wound on his right foot. He has not done any PT since January.     Prior pertinent laboratory work-up:  4/27/22: IgM 134  3/21: Serum IF showed monoclonal IgM kappa 150, last checked 03/2021.   3/19: Serum IF showed monoclonal IgM kappa. IgM 111.   3/18: MAG negative. Urine IF showed no monoclonal protein.   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 IgM 113.     Prior pertinent radiology work-up:  2015: CT LS spine showed degenerative changes resulting in mild to moderate spinal canal narrowing from L2-L5.    Prior electrophysiologic work-up:  6/14: Nerve conduction studies/EMG performed at [facility] showed a left common peroneal neuropathy vs L5 radiculopathy. There also were findings suggesting a motor predominant polyneuropathy vs  polyradiculopathy.   4/18: NCS/EMG showed multiple findings. See report for details.      Past Medical History:   Past Medical History:   Diagnosis Date    Advanced open-angle glaucoma     Atrial fibrillation (H)     CKD (chronic kidney disease), stage III (H) 2005    Colon cancer (H)     Stage II-B colon cancer    Coronary artery disease     s/p CABG x 2, JEREMY x 2    Diverticulitis     Hyperlipidemia     Hypertension     Ischemic cardiomyopathy     MGUS (monoclonal gammopathy of unknown significance)     Nonsenile cataract     BE    Osteoporosis     left hip fracture    Polymorphic ventricular tachycardia (H)     Polymyalgia rheumatica (H)     PVD (posterior vitreous detachment), both eyes 2005    S/P ablation of atrial flutter 6/20/14    CTI    Stented coronary artery     SVT (supraventricular tachycardia) (H)     PPM/AICD for NSVT    Upper leg DVT (deep venous thromboembolism), chronic (H)     Left    Weight loss, unintentional 2017    15 lb in 4 months     Past Surgical History:  Past Surgical History:   Procedure Laterality Date    CATARACT IOL, RT/LT Bilateral     COLONOSCOPY  3/13/2014    Procedure: COMBINED COLONOSCOPY, SINGLE BIOPSY/POLYPECTOMY BY BIOPSY;;  Surgeon: Mary Gerber MD;  Location:  GI    COLONOSCOPY N/A 6/22/2015    Procedure: COLONOSCOPY;  Surgeon: Marilin Newman MD;  Location:  GI    COLONOSCOPY N/A 11/7/2018    Procedure: COMBINED COLONOSCOPY, SINGLE OR MULTIPLE BIOPSY/POLYPECTOMY BY BIOPSY;  Surgeon: Roberto Esteban MD;  Location:  GI    EP ICD GENERATOR REPLACEMENT DUAL N/A 12/11/2018    Procedure: EP ICD Generator Change Dual;  Surgeon: Deedee Baird MD;  Location:  HEART CARDIAC CATH LAB    H ABLATION ATRIAL FLUTTER      HEART CATH DRUG ELUTING STENT PLACEMENT  4/19/2012    JEREMY x 2 to LCx    IMPLANT IMPLANTABLE CARDIOVERTER DEFIBRILLATOR  5-    ppm/aicd    KNEE SURGERY      right and left knee surgeries    LAPAROSCOPIC ASSISTED COLECTOMY Right  2/1/2019    Procedure: Laparoscopic Converted to Open Transverse Colectomy with Lysis of Adhesions;  Surgeon: Chanelle Guzmán MD;  Location: [facility] OR    LAPAROSCOPIC ASSISTED COLECTOMY LEFT (DESCENDING)  4/8/2014    Procedure: LAPAROSCOPIC ASSISTED COLECTOMY LEFT (DESCENDING);  Hand assisted Laparoscopic Sigmoid Colectomy , Laparoscopic mobilization of spleenic flexure *Latex Allergy*Anesthesia General with Block;  Surgeon: Chanelle Guzmán MD;  Location: [facility] OR    OPEN REDUCTION INTERNAL FIXATION RODDING INTRAMEDULLAR FEMUR FRACTURE TABLE Left 3/14/2019    Procedure: Open Reduction Internal Fixation Left Femur Intramedullar Nailing;  Surgeon: Srikanth Avalos MD;  Location: [facility] OR    REPAIR VALVE MITRAL  2006     30-mm Medtronic Julian ring     SELECTIVE LASER TRABECULOPLASTY (SLT) OD (RIGHT EYE)  4/10, 1/12,+1/9/13    RE    SELECTIVE LASER TRABECULOPLASTY (SLT) OS (LEFT EYE)  5/2012    SHOULDER SURGERY      right rotator cuff    ZZC CABG, ARTERY-VEIN, FOUR  2006    LIMA-LAD, SVG-Rt PDA, SVG-OM2, SVG-Diag 1    Z CABG, VEIN, SINGLE  1994    1-vessel CABG, SVG->PDRCA      Family history:    There is no known family history of hereditary neuropathies or other neuromuscular disorders.    Social History:    He denies tobacco, alcohol, or illicit drug use.     Medical Allergies:     Allergies   Allergen Reactions    Latex Rash     Rash     Current Medications:   Current Outpatient Medications   Medication    acetaminophen (TYLENOL) 500 MG tablet    alendronate (FOSAMAX) 70 MG tablet    bacitracin 500 UNIT/GM ophthalmic ointment    baclofen (LIORESAL) 10 MG tablet    calcium carbonate 500 mg, elemental, (OSCAL;OYSTER SHELL CALCIUM) 500 MG tablet    cholecalciferol 1000 units TABS    COMPRESSION STOCKINGS    cyanocobalamin (VITAMIN B-12) 1000 MCG tablet    diclofenac (VOLTAREN) 1 % topical gel    Emollient (CERAVE SA RENEWING) LOTN    Emollient (CERAVE) CREA    ferrous sulfate (FE TABS) 325 (65  Fe) MG EC tablet    ketoconazole (NIZORAL) 2 % external cream    ketoconazole (NIZORAL) 2 % external cream    ketoconazole (NIZORAL) 2 % external cream    ketoconazole (NIZORAL) 2 % external shampoo    lactobacillus rhamnosus, GG, (CULTURELL) capsule    metoprolol tartrate (LOPRESSOR) 25 MG tablet    mirtazapine (REMERON) 15 MG tablet    Multiple Vitamins-Minerals (MULTIVITAMIN ADULT PO)    risperiDONE (RISPERDAL) 0.5 MG tablet    sennosides (SENOKOT) 8.6 MG tablet    sertraline (ZOLOFT) 100 MG tablet    tamsulosin (FLOMAX) 0.4 MG capsule    urea (GORMEL) 20 % external cream    warfarin ANTICOAGULANT (JANTOVEN ANTICOAGULANT) 5 MG tablet     Current Facility-Administered Medications   Medication    lidocaine (PF) (XYLOCAINE) 1 % injection 4 mL    lidocaine (PF) (XYLOCAINE) 1 % injection 4 mL    triamcinolone (KENALOG-40) injection 40 mg    triamcinolone (KENALOG-40) injection 40 mg     Review of Systems: A review of systems was obtained and was negative except for what was noted above.    Physical examination:    /58 (BP Location: Right arm, Patient Position: Sitting, Cuff Size: Adult Regular)   Pulse 62   Temp 97.3  F (36.3  C) (Temporal)     General Appearance: NAD    Neurologic examination:    Mental status:  Patient is alert, attentive. Language was not formally assessed and spontaneous speech is limited. He is able to comply with verbal commands and does respond appropriately to questions.  Bilateral grasp reflex is present.      Cranial nerves:  Extraocular movements were full. There was no face, jaw, palate or tongue weakness or atrophy.      Motor exam: Due to limited comprehension formal strength assessment is difficult. He has left dorsiflexion weakness, which is similar to when I saw him last. Other lower limb muscles are thiin and deconditioned, but probably strength as expected for age and other factors.     Complex motor skills: Mild postural hand tremor.      Sensory exam: Pin decreased in left  peroneal distribution. Vibration reduced in toes > ankles, but not absent.    Gait: unable to assess     Deep tendon reflexes:   Right Left   Triceps 2 2   Biceps 2 2   Brachioradialis 2 2   Knee jerk 0 0   Ankle jerk 0 0     Assessment:    Multifactorial gait disorder with contributions from age, neuropathy, left peroneal neuropathy and arthritis.     Plan:      1. IgM MGUS: IgM 4/27/22 was 134 (normal). Will repeat IgM for surveillance today.   2. Follow up with podiatry for foot hygeine  3. Balance: Referral to physical therapy  4. Knee osteoarthritis: Follow up Dr. Razo  5. Peroneal neuropathy: Avoid pressure to posterior lateral knee. Peroneal neuropathy is stable.   6. Follow up in about 12 months. Sooner if needed.   ---          Again, thank you for allowing me to participate in the care of your patient.      Sincerely,    Jase Duarte MD

## 2023-05-16 NOTE — PROGRESS NOTES
Chief Complaint(s) and History of Present Illness(es)     Primary Open Angle Glaucoma Follow Up            Laterality: both eyes    Associated symptoms: Negative for eye pain, flashes and floaters    Treatment side effects: none    Pain scale: 0/10    Comments: 1.5 year follow up POAG both eyes          Comments    Pt states no vision changes both eyes. Uses +2.50 OTC readers for near.  No eye pain or discomfort. No flashes or floaters.   Both eyes doing well-- no other concerns.   Ocular meds: None    Dong Ho 1:01 PM May 16, 2023             Review of systems for the eyes was negative other than the pertinent positives/negatives listed in the HPI.      Assessment & Plan      Noe Florence is a 87 year old male with the following diagnoses:   1. Pseudophakia of both eyes    2. PCO (posterior capsular opacification), bilateral    3. Bilateral dry eyes    4. Primary open angle glaucoma (POAG) of both eyes, mild stage         Lost to follow up 2017 - 2021.  Last seen Nov 2021  S/P cataract extraction c iStent in both eyes     Intraocular pressure acceptable today in both eyes   OCT Nerve fiber layer with generalized thinning 2017 to 2021  Unable to transfer for imaging today      Ok to monitor intraocular pressure without drops  Artificial tears as needed   Happy with over the counter readers  Posterior capsular opacity (PCO) right eye > left eye  VISUALLY SIGNIFICANT right eye, but not noticing difficulty  Monitor for now    Return precautions reviewed       Patient disposition:   Return in about 1 year (around 5/16/2024) for DFE.           Attending Physician Attestation:  Complete documentation of historical and exam elements from today's encounter can be found in the full encounter summary report (not reduplicated in this progress note).  I personally obtained the chief complaint(s) and history of present illness.  I confirmed and edited as necessary the review of systems, past medical/surgical history, family  history, social history, and examination findings as documented by others; and I examined the patient myself.  I personally reviewed the relevant tests, images, and reports as documented above.  I formulated and edited as necessary the assessment and plan and discussed the findings and management plan with the patient and family. . - Omero Srinivasan MD

## 2023-05-16 NOTE — NURSING NOTE
Chief Complaints and History of Present Illnesses   Patient presents with     Primary Open Angle Glaucoma Follow Up     1.5 year follow up POAG both eyes     Chief Complaint(s) and History of Present Illness(es)     Primary Open Angle Glaucoma Follow Up            Laterality: both eyes    Associated symptoms: Negative for eye pain, flashes and floaters    Treatment side effects: none    Pain scale: 0/10    Comments: 1.5 year follow up POAG both eyes          Comments    Pt states no vision changes both eyes. Uses +2.50 OTC readers for near.  No eye pain or discomfort. No flashes or floaters.   Both eyes doing well-- no other concerns.   Ocular meds: None    Dong Ho 1:01 PM May 16, 2023

## 2023-05-17 NOTE — PROGRESS NOTES
ANTICOAGULATION MANAGEMENT     Noe Florence 87 year old male is on warfarin with therapeutic INR result. (Goal INR Other -1.5-2.5)    Recent labs: (last 7 days)     05/17/23  0000   INR 1.5*       ASSESSMENT       Source(s): Chart Review and Patient/Caregiver Call       Warfarin doses taken: Warfarin taken as instructed    Diet: No new diet changes identified    Medication/supplement changes: None noted    New illness, injury, or hospitalization: No    Signs or symptoms of bleeding or clotting: No    Previous result: Therapeutic last 2(+) visits    Additional findings: None         PLAN     Recommended plan for no diet, medication or health factor changes affecting INR     Dosing Instructions: Continue your current warfarin dose with next INR in 4 weeks       Summary  As of 5/17/2023    Full warfarin instructions:  5 mg every Sun, Wed, Fri; 2.5 mg all other days   Next INR check:  6/14/2023             Telephone call with  Rica who verbalizes understanding and agrees to plan and who agrees to plan and repeated back plan correctly    Orders given to [facility] ([phone])    Education provided:     Taking warfarin: Importance of taking warfarin as instructed    Goal range and lab monitoring: goal range and significance of current result and Importance of therapeutic range    Plan made per [facility] anticoagulation protocol    Roya Manning RN  Anticoagulation Clinic  5/17/2023    _______________________________________________________________________     Anticoagulation Episode Summary     Current INR goal:  Other - see comment   TTR:  41.0 % (1 y)   Target end date:  Indefinite   Send INR reminders to:  [facility]    Indications    Atrial fibrillation (H) [I48.91] [I48.91]  Long-term (current) use of anticoagulants [Z79.01] [Z79.01]  Deep vein thrombosis (DVT) (H) [I82.409]  Atrial fibrillation  unspecified type (H) [I48.91]           Comments:  Goal range: 1.5-2.5         Anticoagulation  Care Providers     Provider Role Specialty Phone number    Roberto Sarmiento MD Referring Internal Medicine [phone]

## 2023-05-30 NOTE — NURSING NOTE
[address] [address]  Dept: [phone]  ______________________________________________________________________________    Patient: Noe Florence   : 1935   MRN: [id number]   May 30, 2023    INVASIVE PROCEDURE SAFETY CHECKLIST    Date: May 30, 2023   Procedure: Bilateral knee joint injections with Synvisc-One  Patient Name: Noe Florence  MRN: [id number]  YOB: 1935    Action: Complete sections as appropriate. Any discrepancy results in a HARD COPY until resolved.     PRE PROCEDURE:  Patient ID verified with 2 identifiers (name and  or MRN): Yes  Procedure and site verified with patient/designee (when able): Yes  Accurate consent documentation in medical record: Yes  H&P (or appropriate assessment) documented in medical record: Yes  H&P must be up to 20 days prior to procedure and updates within 24 hours of procedure as applicable: NA  Relevant diagnostic and radiology test results appropriately labeled and displayed as applicable: NA  Procedure site(s) marked with provider initials: NA    TIMEOUT:  Time-Out performed immediately prior to starting procedure, including verbal and active participation of all team members addressing the following:Yes  * Correct patient identify  * Confirmed that the correct side and site are marked  * An accurate procedure consent form  * Agreement on the procedure to be done  * Correct patient position  * Relevant images and results are properly labeled and appropriately displayed  * The need to administer antibiotics or fluids for irrigation purposes during the procedure as applicable   * Safety precautions based on patient history or medication use    DURING PROCEDURE: Verification of correct person, site, and procedures any time the responsibility for care of the patient is transferred to another member of the care team.       Prior to injection, verified patient identity using patient's  name and date of birth.  Due to injection administration, patient instructed to remain in clinic for 15 minutes  afterwards, and to report any adverse reaction to me immediately.    Joint injection was performed.      Lido x2  Drug Amount Wasted:  Yes: 2 mg/ml   Vial/Syringe: Single dose vial  Expiration Date:  12/01/2026    Synvisc-One  Drug Amount Wasted: None  Vial/Syringe: Syringe  Expiration Date:  01/01/2026 and 02/01/2026    Felipa Sanders, ATC  May 30, 2023

## 2023-05-30 NOTE — LETTER
5/30/2023      RE: Noe Florence  [address] [facility] [address]     Dear Colleague,    Thank you for referring your patient, Noe Florence, to the [facility] [place]. Please see a copy of my visit note below.    Sha returns for bilateral knee arthritis injections with synvisc one as planned  Diagnosis: bilateral knee arthritis  PROCEDURE:         bilateral   Knee joint injection with synvisc one    The patient was apprised of the risks and the benefits of the procedure and consented and a written consent was signed.   The patient s  KNEEs was sterilely prepped with chloraprep.     The patient was injected with synvisc one syringe into the right and left knee with a 1.5-inch 22-gauge needle at the_superiorlateral aspect of their knee joint    There were no complications. The patient tolerated the procedure well. There was minimal bleeding.   The patient was instructed to ice the knees upon leaving clinic and refrain from overuse over the next 3 days.   The patient was instructed to go to the emergency room with any usual pain, swelling, or redness occurred in the injected area.   The patient was given a followup appointment to evaluate response to the injection to their increased range of motion and reduction of pain.  Follow up for synvisc one  Dr Lauri Razo        Large Joint Injection: bilateral knee    Date/Time: 5/30/2023 1:17 PM    Performed by: Lauri Razo MD  Authorized by: Lauri Razo MD    Indications:  Pain and osteoarthritis  Needle Size comment:  23G  Guidance: landmark guided    Approach:  Superolateral  Location:  Knee  Laterality:  Bilateral      Medications (Right):  3 mL lidocaine (PF) 1 %; 48 mg hylan 48 MG/6ML  Medications (Left):  3 mL lidocaine (PF) 1 %; 48 mg hylan 48 MG/6ML  Outcome:  Tolerated well, no immediate complications  Procedure discussed: discussed risks, benefits, and alternatives    Consent Given by:   Patient  Timeout: timeout called immediately prior to procedure    Prep: patient was prepped and draped in usual sterile fashion            Again, thank you for allowing me to participate in the care of your patient.      Sincerely,    Lauri Razo MD

## 2023-05-30 NOTE — PROGRESS NOTES
Bill returns for bilateral knee arthritis injections with synvisc one as planned  Diagnosis: bilateral knee arthritis  PROCEDURE:         bilateral   Knee joint injection with synvisc one    The patient was apprised of the risks and the benefits of the procedure and consented and a written consent was signed.   The patient s  KNEEs was sterilely prepped with chloraprep.     The patient was injected with synvisc one syringe into the right and left knee with a 1.5-inch 22-gauge needle at the_superiorlateral aspect of their knee joint    There were no complications. The patient tolerated the procedure well. There was minimal bleeding.   The patient was instructed to ice the knees upon leaving clinic and refrain from overuse over the next 3 days.   The patient was instructed to go to the emergency room with any usual pain, swelling, or redness occurred in the injected area.   The patient was given a followup appointment to evaluate response to the injection to their increased range of motion and reduction of pain.  Follow up for synvisc one  Dr Lauri Razo        Large Joint Injection: bilateral knee    Date/Time: 5/30/2023 1:17 PM    Performed by: Lauri Razo MD  Authorized by: Lauri Razo MD    Indications:  Pain and osteoarthritis  Needle Size comment:  23G  Guidance: landmark guided    Approach:  Superolateral  Location:  Knee  Laterality:  Bilateral      Medications (Right):  3 mL lidocaine (PF) 1 %; 48 mg hylan 48 MG/6ML  Medications (Left):  3 mL lidocaine (PF) 1 %; 48 mg hylan 48 MG/6ML  Outcome:  Tolerated well, no immediate complications  Procedure discussed: discussed risks, benefits, and alternatives    Consent Given by:  Patient  Timeout: timeout called immediately prior to procedure    Prep: patient was prepped and draped in usual sterile fashion

## 2023-06-05 NOTE — TELEPHONE ENCOUNTER
Called patient to schedule 6 month follow up virtually with Dr. Baird. No answer - LVM. Also sent [employer] message to schedule.     Marj Buck CMA (AAMA)

## 2023-06-05 NOTE — LETTER
6/5/2023      RE: Noe Florence  [address] [facility] [address]       Dear Colleague,    Thank you for the opportunity to participate in the care of your patient, Noe Florence, at the [facility]. Please see a copy of my visit note below.        HPI: Purpose of visit: Follow-up for atrial fibrillation and VT.      Noe Florence is an 87yo M with a past medical history significant for  HTN, HLD, CKD3, CAD s/p CABG x2, DESx2, polymorphic VT s/p ICD placement (5/2012, gen change 12/2018), and AF/AFL s/p CTI (6/2014) and right atrial appendage atrial tachycardia ablation (9/2014). His ICD reached REBECCA and he underwent an ICD gen change on 12/11/2018. He presents for routine 6 month follow up.     Patient's last visit with me was in December 2022.  In the last 6 months, patient has been having issues with mobility.  He has not fallen once since the last visit.  Every day, he has caregivers who will come to the house and help him exercise twice a day.    Interrogation of the ICD has shown low burden of A-fib and infrequent nonsustained ventricular tachycardia.  Due to his issues with falling, his INR range is 1.5-2.5.  His metoprolol dose has been reduced to 12.5 mg twice daily.  He did not report any symptoms of exertional dyspnea, exertional angina, frequent lightheadedness, syncope or syncope, frequent palpitations, or irregular heartbeat sensation.    PAST MEDICAL HISTORY:  Past Medical History:   Diagnosis Date    Advanced open-angle glaucoma     Atrial fibrillation (H)     CKD (chronic kidney disease), stage III (H) 2005    Colon cancer (H)     Stage II-B colon cancer    Coronary artery disease     s/p CABG x 2, JEREMY x 2    Diverticulitis     Hyperlipidemia     Hypertension     Ischemic cardiomyopathy     MGUS (monoclonal gammopathy of unknown significance)     Nonsenile cataract     BE    Osteoporosis     left hip  fracture    Polymorphic ventricular tachycardia (H)     Polymyalgia rheumatica (H)     PVD (posterior vitreous detachment), both eyes 2005    S/P ablation of atrial flutter 6/20/14    CTI    Stented coronary artery     SVT (supraventricular tachycardia) (H)     PPM/AICD for NSVT    Upper leg DVT (deep venous thromboembolism), chronic (H)     Left    Weight loss, unintentional 2017    15 lb in 4 months       CURRENT MEDICATIONS:  Current Outpatient Medications   Medication Sig Dispense Refill    acetaminophen (TYLENOL) 500 MG tablet Take 1,000 mg by mouth 2 times daily And every day PRN      alendronate (FOSAMAX) 70 MG tablet Take 1 tablet (70 mg) by mouth every 7 days Take with a full glass of water and do not eat or lay down for 30 minutes 12 tablet 3    bacitracin 500 UNIT/GM ophthalmic ointment 1 application, topical, Once A Day, Apply to open areas on right great to and right second toe and cover with dressing daily (has pressure area to toes)      baclofen (LIORESAL) 10 MG tablet Take 1 tablet (10 mg) by mouth 3 times daily 60 tablet 0    calcium carbonate 500 mg, elemental, (OSCAL;OYSTER SHELL CALCIUM) 500 MG tablet Take 1 tablet (500 mg) by mouth 2 times daily 180 tablet 3    cholecalciferol 1000 units TABS Take 1,000 Units by mouth 2 times daily      COMPRESSION STOCKINGS 1 each daily 1 each 4    cyanocobalamin (VITAMIN B-12) 1000 MCG tablet Take 4 daily 400 tablet 3    diclofenac (VOLTAREN) 1 % topical gel Apply 2 g topically 4 times daily 350 g 11    Emollient (CERAVE SA RENEWING) LOTN 1 application, topical, Once A Day, Apply to torso      Emollient (CERAVE) CREA 1 application, topical, Once A Day, Apply to bilateral legs      ferrous sulfate (FE TABS) 325 (65 Fe) MG EC tablet Take 325 mg by mouth daily      ketoconazole (NIZORAL) 2 % external cream Apply topically 2 times daily 60 g 1    ketoconazole (NIZORAL) 2 % external cream Apply to the feet 2 times a day until rash clears then 3-4 times a week for  maintenance 60 g 11    ketoconazole (NIZORAL) 2 % external shampoo Apply to scalp daily as needed until flaking resolves 120 mL 1    lactobacillus rhamnosus, GG, (CULTURELL) capsule Take 1 capsule by mouth 2 times daily      metoprolol tartrate (LOPRESSOR) 25 MG tablet Take 1 tablet (25 mg) by mouth 2 times daily 180 tablet 3    mirtazapine (REMERON) 15 MG tablet Take 15 mg by mouth At Bedtime.      Multiple Vitamins-Minerals (MULTIVITAMIN ADULT PO)       risperiDONE (RISPERDAL) 0.5 MG tablet Take 2 tablets by mouth daily      sennosides (SENOKOT) 8.6 MG tablet Take 1 tablet by mouth 2 times daily      sertraline (ZOLOFT) 100 MG tablet Take 200 mg by mouth every evening      tamsulosin (FLOMAX) 0.4 MG capsule TAKE 2 CAPSULES (0.8 MG) BY MOUTH DAILY 180 capsule 2    urea (GORMEL) 20 % external cream Apply topically 2 times daily Apply to bilateral heels BID      warfarin ANTICOAGULANT (JANTOVEN ANTICOAGULANT) 5 MG tablet TAKE HALF TO ONE TABLET BY MOUTH DAILY OR AS DIRECTED BY THE COUMADIN CLINIC 90 tablet 1    ketoconazole (NIZORAL) 2 % external cream Apply to affected areas on chest and feet daily as needed 60 g 6       PAST SURGICAL HISTORY:  Past Surgical History:   Procedure Laterality Date    CATARACT IOL, RT/LT Bilateral     COLONOSCOPY  3/13/2014    Procedure: COMBINED COLONOSCOPY, SINGLE BIOPSY/POLYPECTOMY BY BIOPSY;;  Surgeon: Mary Gerber MD;  Location:  GI    COLONOSCOPY N/A 6/22/2015    Procedure: COLONOSCOPY;  Surgeon: Marilin Newman MD;  Location:  GI    COLONOSCOPY N/A 11/7/2018    Procedure: COMBINED COLONOSCOPY, SINGLE OR MULTIPLE BIOPSY/POLYPECTOMY BY BIOPSY;  Surgeon: Roberto Esteban MD;  Location:  GI    EP ICD GENERATOR REPLACEMENT DUAL N/A 12/11/2018    Procedure: EP ICD Generator Change Dual;  Surgeon: Deedee Baird MD;  Location:  HEART CARDIAC CATH LAB    H ABLATION ATRIAL FLUTTER      HEART CATH DRUG ELUTING STENT PLACEMENT  4/19/2012    JEREMY x 2 to LCx     IMPLANT IMPLANTABLE CARDIOVERTER DEFIBRILLATOR  2012    ppm/aicd    KNEE SURGERY      right and left knee surgeries    LAPAROSCOPIC ASSISTED COLECTOMY Right 2019    Procedure: Laparoscopic Converted to Open Transverse Colectomy with Lysis of Adhesions;  Surgeon: Chanelle Guzmán MD;  Location: [facility] OR    LAPAROSCOPIC ASSISTED COLECTOMY LEFT (DESCENDING)  2014    Procedure: LAPAROSCOPIC ASSISTED COLECTOMY LEFT (DESCENDING);  Hand assisted Laparoscopic Sigmoid Colectomy , Laparoscopic mobilization of spleenic flexure *Latex Allergy*Anesthesia General with Block;  Surgeon: Chanelle Guzmán MD;  Location: [facility] OR    OPEN REDUCTION INTERNAL FIXATION RODDING INTRAMEDULLAR FEMUR FRACTURE TABLE Left 3/14/2019    Procedure: Open Reduction Internal Fixation Left Femur Intramedullar Nailing;  Surgeon: Srikanth Avalos MD;  Location: [facility] OR    REPAIR VALVE MITRAL  2006     30-mm Medtronic Julian ring     SELECTIVE LASER TRABECULOPLASTY (SLT) OD (RIGHT EYE)  4/10, ,+13    RE    SELECTIVE LASER TRABECULOPLASTY (SLT) OS (LEFT EYE)  2012    SHOULDER SURGERY      right rotator cuff    ZZC CABG, ARTERY-VEIN, FOUR      LIMA-LAD, SVG-Rt PDA, SVG-OM2, SVG-Diag 1    [facility] CABG, VEIN, SINGLE      1-vessel CABG, SVG->PDRCA        ALLERGIES:     Allergies   Allergen Reactions    Latex Rash     Rash       FAMILY HISTORY:  - Premature coronary artery disease  - Atrial fibrillation  - Sudden cardiac death     SOCIAL HISTORY:  Social History     Tobacco Use    Smoking status: Former     Types: Cigarettes, Cigars     Quit date: 1968     Years since quittin.4    Smokeless tobacco: Never   Substance Use Topics    Alcohol use: Not Currently    Drug use: No       ROS:   Constitutional: No fever, chills, or sweats. Weight stable.   ENT: No visual disturbance, ear ache, epistaxis, sore throat.   Cardiovascular: As per HPI.   Respiratory: No cough, hemoptysis.    GI: No nausea, vomiting,  hematemesis, melena, or hematochezia.   : No hematuria.   Integument: Negative.   Psychiatric: Negative.   Hematologic:  Easy bruising, no easy bleeding.  Neuro: Negative.   Endocrinology: No significant heat or cold intolerance   Musculoskeletal: No myalgia.    Exam:  There were no vitals taken for this visit.  GENERAL APPEARANCE: healthy, alert and no distress    Labs:  CBC RESULTS:   Lab Results   Component Value Date    WBC 8.1 04/03/2023    WBC 6.2 05/12/2021    RBC 3.36 (L) 04/03/2023    RBC 3.73 (L) 05/12/2021    HGB 10.2 (L) 04/03/2023    HGB 11.5 (L) 05/12/2021    HCT 32.4 (L) 04/03/2023    HCT 36.5 (L) 05/12/2021    MCV 96 04/03/2023    MCV 98 05/12/2021    MCH 30.4 04/03/2023    MCH 30.8 05/12/2021    MCHC 31.5 04/03/2023    MCHC 31.5 05/12/2021    RDW 15.3 (H) 04/03/2023    RDW 13.5 05/12/2021     04/03/2023     (L) 05/12/2021       BMP RESULTS:  Lab Results   Component Value Date     04/03/2023     05/12/2021    POTASSIUM 4.9 04/03/2023    POTASSIUM 4.6 04/27/2022    POTASSIUM 4.5 05/12/2021    CHLORIDE 111 (H) 04/03/2023    CHLORIDE 109 04/27/2022    CHLORIDE 113 (H) 05/12/2021    CO2 22 04/03/2023    CO2 26 04/27/2022    CO2 26 05/12/2021    ANIONGAP 10 04/03/2023    ANIONGAP 5 04/27/2022    ANIONGAP 5 05/12/2021     (H) 04/03/2023    GLC 79 04/27/2022    GLC 93 05/12/2021    BUN 48.9 (H) 04/03/2023    BUN 50 (H) 04/27/2022    BUN 48 (H) 05/12/2021    CR 1.51 (H) 04/03/2023    CR 1.49 (H) 05/12/2021    GFRESTIMATED 44 (L) 04/03/2023    GFRESTIMATED 31 (L) 09/18/2021    GFRESTIMATED 42 (L) 05/12/2021    GFRESTBLACK 49 (L) 05/12/2021    KEITH 9.7 04/03/2023    KEITH 9.2 05/12/2021        INR RESULTS:  Lab Results   Component Value Date    INR 1.5 (A) 05/17/2023    INR 1.6 (A) 05/03/2023    INR 1.69 (H) 04/03/2023    INR 1.5 (A) 03/28/2023    INR 1.6 (A) 03/06/2023    INR 2.31 (H) 07/05/2021    INR 1.90 (H) 06/07/2021    INR 1.58 (H) 05/12/2021    INR 1.64 (H) 04/12/2021        Procedures:      Assessment and Plan:   Paroxysmal atrial fibrillation/flutter s/p CTI 2014 and focal AT ablation 2014  Hx of VT status post ICD  CAD s/p CABG and PCI       It is encouraging that patient is doing well from an arrhythmia standpoint.  We will continue current medications and see patient again by video visit in 6 months.  All questions and concerns were addressed and patient and family are happy with the plan.      CC  Patient Care Team:  Roberto Sarmiento MD as PCP - General (Internal Medicine)          Please do not hesitate to contact me if you have any questions/concerns.     Sincerely,     Deedee Baird MD

## 2023-06-05 NOTE — PROGRESS NOTES
Sha is a 88 year old who is being evaluated via a billable video visit.      How would you like to obtain your AVS? [email or web address]  If the video visit is dropped, the invitation should be resent by: Text to cell phone: [phone]  Will anyone else be joining your video visit? No      Video-Visit Details    Type of service:  Video Visit   Video Start Time: 11:06 AM  Video End Time: 11:23 AM    Originating Location (pt. Location): Home  Distant Location (provider location):  Off-site  Platform used for Video Visit: Telvent Git    HPI: Purpose of visit: Follow-up for atrial fibrillation and VT.      Noe Florence is an 89yo M with a past medical history significant for  HTN, HLD, CKD3, CAD s/p CABG x2, DESx2, polymorphic VT s/p ICD placement (5/2012, gen change 12/2018), and AF/AFL s/p CTI (6/2014) and right atrial appendage atrial tachycardia ablation (9/2014). His ICD reached REBECCA and he underwent an ICD gen change on 12/11/2018. He presents for routine 6 month follow up.     Patient's last visit with me was in December 2022.  In the last 6 months, patient has been having issues with mobility.  He has not fallen once since the last visit.  Every day, he has caregivers who will come to the house and help him exercise twice a day.    Interrogation of the ICD has shown low burden of A-fib and infrequent nonsustained ventricular tachycardia.  Due to his issues with falling, his INR range is 1.5-2.5.  His metoprolol dose has been reduced to 12.5 mg twice daily.  He did not report any symptoms of exertional dyspnea, exertional angina, frequent lightheadedness, syncope or syncope, frequent palpitations, or irregular heartbeat sensation.    PAST MEDICAL HISTORY:  Past Medical History:   Diagnosis Date     Advanced open-angle glaucoma      Atrial fibrillation (H)      CKD (chronic kidney disease), stage III (H) 2005     Colon cancer (H)     Stage II-B colon cancer     Coronary artery disease     s/p CABG x 2, JEREMY x 2     Diverticulitis       Hyperlipidemia      Hypertension      Ischemic cardiomyopathy      MGUS (monoclonal gammopathy of unknown significance)      Nonsenile cataract     BE     Osteoporosis     left hip fracture     Polymorphic ventricular tachycardia (H)      Polymyalgia rheumatica (H)      PVD (posterior vitreous detachment), both eyes 2005     S/P ablation of atrial flutter 6/20/14    CTI     Stented coronary artery      SVT (supraventricular tachycardia) (H)     PPM/AICD for NSVT     Upper leg DVT (deep venous thromboembolism), chronic (H)     Left     Weight loss, unintentional 2017    15 lb in 4 months       CURRENT MEDICATIONS:  Current Outpatient Medications   Medication Sig Dispense Refill     acetaminophen (TYLENOL) 500 MG tablet Take 1,000 mg by mouth 2 times daily And every day PRN       alendronate (FOSAMAX) 70 MG tablet Take 1 tablet (70 mg) by mouth every 7 days Take with a full glass of water and do not eat or lay down for 30 minutes 12 tablet 3     bacitracin 500 UNIT/GM ophthalmic ointment 1 application, topical, Once A Day, Apply to open areas on right great to and right second toe and cover with dressing daily (has pressure area to toes)       baclofen (LIORESAL) 10 MG tablet Take 1 tablet (10 mg) by mouth 3 times daily 60 tablet 0     calcium carbonate 500 mg, elemental, (OSCAL;OYSTER SHELL CALCIUM) 500 MG tablet Take 1 tablet (500 mg) by mouth 2 times daily 180 tablet 3     cholecalciferol 1000 units TABS Take 1,000 Units by mouth 2 times daily       COMPRESSION STOCKINGS 1 each daily 1 each 4     cyanocobalamin (VITAMIN B-12) 1000 MCG tablet Take 4 daily 400 tablet 3     diclofenac (VOLTAREN) 1 % topical gel Apply 2 g topically 4 times daily 350 g 11     Emollient (CERAVE SA RENEWING) LOTN 1 application, topical, Once A Day, Apply to torso       Emollient (CERAVE) CREA 1 application, topical, Once A Day, Apply to bilateral legs       ferrous sulfate (FE TABS) 325 (65 Fe) MG EC tablet Take 325 mg by mouth daily        ketoconazole (NIZORAL) 2 % external cream Apply topically 2 times daily 60 g 1     ketoconazole (NIZORAL) 2 % external cream Apply to the feet 2 times a day until rash clears then 3-4 times a week for maintenance 60 g 11     ketoconazole (NIZORAL) 2 % external shampoo Apply to scalp daily as needed until flaking resolves 120 mL 1     lactobacillus rhamnosus, GG, (CULTURELL) capsule Take 1 capsule by mouth 2 times daily       metoprolol tartrate (LOPRESSOR) 25 MG tablet Take 1 tablet (25 mg) by mouth 2 times daily 180 tablet 3     mirtazapine (REMERON) 15 MG tablet Take 15 mg by mouth At Bedtime.       Multiple Vitamins-Minerals (MULTIVITAMIN ADULT PO)        risperiDONE (RISPERDAL) 0.5 MG tablet Take 2 tablets by mouth daily       sennosides (SENOKOT) 8.6 MG tablet Take 1 tablet by mouth 2 times daily       sertraline (ZOLOFT) 100 MG tablet Take 200 mg by mouth every evening       tamsulosin (FLOMAX) 0.4 MG capsule TAKE 2 CAPSULES (0.8 MG) BY MOUTH DAILY 180 capsule 2     urea (GORMEL) 20 % external cream Apply topically 2 times daily Apply to bilateral heels BID       warfarin ANTICOAGULANT (JANTOVEN ANTICOAGULANT) 5 MG tablet TAKE HALF TO ONE TABLET BY MOUTH DAILY OR AS DIRECTED BY THE COUMADIN CLINIC 90 tablet 1     ketoconazole (NIZORAL) 2 % external cream Apply to affected areas on chest and feet daily as needed 60 g 6       PAST SURGICAL HISTORY:  Past Surgical History:   Procedure Laterality Date     CATARACT IOL, RT/LT Bilateral      COLONOSCOPY  3/13/2014    Procedure: COMBINED COLONOSCOPY, SINGLE BIOPSY/POLYPECTOMY BY BIOPSY;;  Surgeon: Mary Gerber MD;  Location:  GI     COLONOSCOPY N/A 6/22/2015    Procedure: COLONOSCOPY;  Surgeon: Marilin Newman MD;  Location:  GI     COLONOSCOPY N/A 11/7/2018    Procedure: COMBINED COLONOSCOPY, SINGLE OR MULTIPLE BIOPSY/POLYPECTOMY BY BIOPSY;  Surgeon: Roberto Esteban MD;  Location:  GI     EP ICD GENERATOR REPLACEMENT DUAL N/A  2018    Procedure: EP ICD Generator Change Dual;  Surgeon: Deedee Baird MD;  Location:  HEART CARDIAC CATH LAB     H ABLATION ATRIAL FLUTTER       HEART CATH DRUG ELUTING STENT PLACEMENT  2012    JEREMY x 2 to LCx     IMPLANT IMPLANTABLE CARDIOVERTER DEFIBRILLATOR  2012    ppm/aicd     KNEE SURGERY      right and left knee surgeries     LAPAROSCOPIC ASSISTED COLECTOMY Right 2019    Procedure: Laparoscopic Converted to Open Transverse Colectomy with Lysis of Adhesions;  Surgeon: Chanelle Guzmán MD;  Location: [facility]     LAPAROSCOPIC ASSISTED COLECTOMY LEFT (DESCENDING)  2014    Procedure: LAPAROSCOPIC ASSISTED COLECTOMY LEFT (DESCENDING);  Hand assisted Laparoscopic Sigmoid Colectomy , Laparoscopic mobilization of spleenic flexure *Latex Allergy*Anesthesia General with Block;  Surgeon: Chanelle Guzmán MD;  Location: [facility] OR     OPEN REDUCTION INTERNAL FIXATION RODDING INTRAMEDULLAR FEMUR FRACTURE TABLE Left 3/14/2019    Procedure: Open Reduction Internal Fixation Left Femur Intramedullar Nailing;  Surgeon: Srikanth Avalos MD;  Location:  OR     REPAIR VALVE MITRAL       30-mm Medtronic Julian ring      SELECTIVE LASER TRABECULOPLASTY (SLT) OD (RIGHT EYE)  4/10, ,+13    RE     SELECTIVE LASER TRABECULOPLASTY (SLT) OS (LEFT EYE)  2012     SHOULDER SURGERY      right rotator cuff     ZZC CABG, ARTERY-VEIN, FOUR      LIMA-LAD, SVG-Rt PDA, SVG-OM2, SVG-Diag 1     ZZC CABG, VEIN, SINGLE      1-vessel CABG, SVG->PDRCA        ALLERGIES:     Allergies   Allergen Reactions     Latex Rash     Rash       FAMILY HISTORY:  - Premature coronary artery disease  - Atrial fibrillation  - Sudden cardiac death     SOCIAL HISTORY:  Social History     Tobacco Use     Smoking status: Former     Types: Cigarettes, Cigars     Quit date: 1968     Years since quittin.4     Smokeless tobacco: Never   Substance Use Topics     Alcohol use: Not Currently      Drug use: No       ROS:   Constitutional: No fever, chills, or sweats. Weight stable.   ENT: No visual disturbance, ear ache, epistaxis, sore throat.   Cardiovascular: As per HPI.   Respiratory: No cough, hemoptysis.    GI: No nausea, vomiting, hematemesis, melena, or hematochezia.   : No hematuria.   Integument: Negative.   Psychiatric: Negative.   Hematologic:  Easy bruising, no easy bleeding.  Neuro: Negative.   Endocrinology: No significant heat or cold intolerance   Musculoskeletal: No myalgia.    Exam:  There were no vitals taken for this visit.  GENERAL APPEARANCE: healthy, alert and no distress    Labs:  CBC RESULTS:   Lab Results   Component Value Date    WBC 8.1 04/03/2023    WBC 6.2 05/12/2021    RBC 3.36 (L) 04/03/2023    RBC 3.73 (L) 05/12/2021    HGB 10.2 (L) 04/03/2023    HGB 11.5 (L) 05/12/2021    HCT 32.4 (L) 04/03/2023    HCT 36.5 (L) 05/12/2021    MCV 96 04/03/2023    MCV 98 05/12/2021    MCH 30.4 04/03/2023    MCH 30.8 05/12/2021    MCHC 31.5 04/03/2023    MCHC 31.5 05/12/2021    RDW 15.3 (H) 04/03/2023    RDW 13.5 05/12/2021     04/03/2023     (L) 05/12/2021       BMP RESULTS:  Lab Results   Component Value Date     04/03/2023     05/12/2021    POTASSIUM 4.9 04/03/2023    POTASSIUM 4.6 04/27/2022    POTASSIUM 4.5 05/12/2021    CHLORIDE 111 (H) 04/03/2023    CHLORIDE 109 04/27/2022    CHLORIDE 113 (H) 05/12/2021    CO2 22 04/03/2023    CO2 26 04/27/2022    CO2 26 05/12/2021    ANIONGAP 10 04/03/2023    ANIONGAP 5 04/27/2022    ANIONGAP 5 05/12/2021     (H) 04/03/2023    GLC 79 04/27/2022    GLC 93 05/12/2021    BUN 48.9 (H) 04/03/2023    BUN 50 (H) 04/27/2022    BUN 48 (H) 05/12/2021    CR 1.51 (H) 04/03/2023    CR 1.49 (H) 05/12/2021    GFRESTIMATED 44 (L) 04/03/2023    GFRESTIMATED 31 (L) 09/18/2021    GFRESTIMATED 42 (L) 05/12/2021    GFRESTBLACK 49 (L) 05/12/2021    KEITH 9.7 04/03/2023    KEITH 9.2 05/12/2021        INR RESULTS:  Lab Results   Component Value  Date    INR 1.5 (A) 05/17/2023    INR 1.6 (A) 05/03/2023    INR 1.69 (H) 04/03/2023    INR 1.5 (A) 03/28/2023    INR 1.6 (A) 03/06/2023    INR 2.31 (H) 07/05/2021    INR 1.90 (H) 06/07/2021    INR 1.58 (H) 05/12/2021    INR 1.64 (H) 04/12/2021       Procedures:      Assessment and Plan:   Paroxysmal atrial fibrillation/flutter s/p CTI 2014 and focal AT ablation 2014  Hx of VT status post ICD  CAD s/p CABG and PCI       It is encouraging that patient is doing well from an arrhythmia standpoint.  We will continue current medications and see patient again by video visit in 6 months.  All questions and concerns were addressed and patient and family are happy with the plan.      CC  Patient Care Team:  Roberto Sarmiento MD as PCP - General (Internal Medicine)  Francisco Gilliam MD as MD (Oncology)  Omero Srinivasan MD as MD (Ophthalmology)  Hay Arnett MD as MD (Internal Medicine)  NIKHIL Wheeler MD as MD (Dermatology)  Deedee Baird MD as MD (Cardiology)  Bhavana Mckeon MD as MD (Internal Medicine)  Naveed Ram MD as Resident  Guzman Boudreaux DPM (Podiatry)  Joshua Centeno MD as MD (Family Practice)  Brett Petersen MD as MD (Dermapathology)  Clarice Magallanes APRN CNP as Nurse Practitioner (Nurse Practitioner - Family)  Lashawn Jackson MD as MD (Dermatology)  Marci Palmer, BYRON as Nurse Coordinator (Oncology)  Lashawn Cool, BRYON as Specialty Care Coordinator (Cardiology)  Jaye Alexander APRN CNP as Nurse Practitioner (Nurse Practitioner - Family)  Alona Lowe CNP as Nurse Practitioner (Nurse Practitioner)  Octaviano Santos MD as MD (Interventional Cardiology)  Jase Duarte MD as Assigned Neuroscience Provider  Matt Vásquez MD as MD (Dermatology)  Deedee Baird MD as Assigned Heart and Vascular Provider  Roberto Sarmiento MD as Assigned PCP  Francisco Gilliam MD as Assigned Cancer Care  Provider  Lauri Razo MD as Assigned Musculoskeletal Provider  Omero Srinivasan MD as Assigned Surgical Provider

## 2023-06-05 NOTE — PATIENT INSTRUCTIONS
Thank you for coming to the [facility] [place]; please note the following instructions:    1. Follow-up in 6 months virtually        If you have any questions regarding your visit please contact your care team:     Cardiology  Telephone Number   Sendy AIKEN, RN  Sary VILLAFANA, RN   Rylee WANG, RMCHRISSIE GUNTER, ELIJAH WARREN, Visit Facilitator  Marj STEWARD [facility] [phone] (option 1)   For scheduling appts:     [phone] (select option 1)       For the Device Clinic (Pacemakers and ICD's)  RN's :  Mary Chavez   During business hours: [phone]    *After business hours:  [phone] (select option 4)      Normal test result notifications will be released via [employer] or mailed within 7 business days.  All other test results, will be communicated via telephone once reviewed by your cardiologist.    If you need a medication refill please contact your pharmacy.  Please allow 3 business days for your refill to be completed.    As always, thank you for trusting us with your health care needs!

## 2023-06-06 NOTE — TELEPHONE ENCOUNTER
Called and spoke with Rica, patient's significant other, and scheduled virtual 6 month follow-up. Rica wrote down the date and time of virtual visit follow-up.    GONZÁLEZ Bryant

## 2023-06-14 NOTE — PROGRESS NOTES
ANTICOAGULATION MANAGEMENT     Noe Florence 88 year old male is on warfarin with therapeutic INR result. (Goal INR Other - 1.5-2.5)    Recent labs: (last 7 days)     06/14/23  0000   INR 1.5*       ASSESSMENT       Source(s): Chart Review and Patient/Caregiver Call       Warfarin doses taken: Warfarin taken as instructed    Diet: No new diet changes identified    Medication/supplement changes: None noted    New illness, injury, or hospitalization: No    Signs or symptoms of bleeding or clotting: No    Previous result: Therapeutic last 2(+) visits    Additional findings: None         PLAN     Recommended plan for no diet, medication or health factor changes affecting INR     Dosing Instructions: Continue your current warfarin dose with next INR in 4 weeks       Summary  As of 6/14/2023    Full warfarin instructions:  5 mg every Sun, Wed, Fri; 2.5 mg all other days   Next INR check:  7/12/2023             Telephone call with  Michael who verbalizes understanding and agrees to plan and who agrees to plan and repeated back plan correctly    Orders given to  Homecare nurse/facility to recheck    Education provided:     Taking warfarin: Importance of taking warfarin as instructed    Goal range and lab monitoring: goal range and significance of current result and Importance of therapeutic range    Plan made per [facility] anticoagulation protocol    Roya Manning RN  Anticoagulation Clinic  6/14/2023    _______________________________________________________________________     Anticoagulation Episode Summary     Current INR goal:  Other - see comment   TTR:  40.1 % (1 y)   Target end date:  Indefinite   Send INR reminders to:  [facility]    Indications    Atrial fibrillation (H) [I48.91] [I48.91]  Long-term (current) use of anticoagulants [Z79.01] [Z79.01]  Deep vein thrombosis (DVT) (H) [I82.409]  Atrial fibrillation  unspecified type (H) [I48.91]           Comments:  Goal range: 1.5-2.5         Anticoagulation  Care Providers     Provider Role Specialty Phone number    Roberto Sarmiento MD Referring Internal Medicine [phone]

## 2023-06-22 NOTE — PATIENT INSTRUCTIONS
It was a pleasure to see you in clinic today. Please do not hesitate to call with any questions or concerns.    BRIANA Montenegro, RN  Electrophysiology Nurse Clinician  [facility]  During business hours call:  [phone]  Urgent needs after hours- please call: [phone]- select option #4 and ask for job code [phone].

## 2023-07-14 PROBLEM — I48.20 CHRONIC ATRIAL FIBRILLATION (H): Status: ACTIVE | Noted: 2023-01-01

## 2023-07-14 NOTE — PROGRESS NOTES
Go called from [facility] to inform [facility] this patient's INR was checked on 7/12/23 as directed but for some reason the result was not faxed.    Routing to [facility] group.    LUANN TillmanN, RN  Anticoagulation Clinic

## 2023-07-14 NOTE — PROGRESS NOTES
ANTICOAGULATION MANAGEMENT     Noe Florence 88 year old male is on warfarin with therapeutic INR result. (Goal INR 1.5-2.5)    Recent labs: (last 7 days)     07/12/23  1226   INR 1.6*       ASSESSMENT       Source(s): Chart Review and Patient/Caregiver Call       Warfarin doses taken: Warfarin taken as instructed    Diet: No new diet changes identified    Medication/supplement changes: None noted    New illness, injury, or hospitalization: Yes: Rica reports that pt was having some groin pain and a rash on his testicles. A UA was done and a UC is still in process. Today on 7/14/23, pt's symptoms have improved. Pt does not have urinary symptoms.     Signs or symptoms of bleeding or clotting: No    Previous result: Therapeutic last 2(+) visits    Additional findings: Rica advised to call [facility] if any changes in medication or treatment once the provider gives advisement to them. Rica understood that pt may need an INR check sooner than 4 weeks if he starts a new medication/antibiotic.         PLAN     Dosing Instructions: Continue your current warfarin dose with next INR in 4 weeks       Summary  As of 7/14/2023    Full warfarin instructions:  5 mg every Sun, Wed, Fri; 2.5 mg all other days   Next INR check:  8/9/2023             Telephone call with Rica significant other who verbalizes understanding and agrees to plan    Orders given to [facility] ([phone])    Education provided:     Please call back if any changes to your diet, medications or how you've been taking warfarin    Contact [phone] with any changes, questions or concerns.     Plan made per [facility] anticoagulation protocol    Apryl Steiner, RN  Anticoagulation Clinic  7/14/2023    _______________________________________________________________________     Anticoagulation Episode Summary     Current INR goal:  Other - see comment   TTR:  36.7 % (1 y)   Target end date:  Indefinite   Send INR reminders to:  [facility]     Indications    Atrial fibrillation (H) [I48.91] [I48.91]  Long-term (current) use of anticoagulants [Z79.01] [Z79.01]  Deep vein thrombosis (DVT) (H) [I82.409]  Atrial fibrillation  unspecified type (H) [I48.91]           Comments:  Goal range: 1.5-2.5         Anticoagulation Care Providers     Provider Role Specialty Phone number    Roberto Sarmiento MD Referring Internal Medicine [phone]

## 2023-07-14 NOTE — PROGRESS NOTES
ANTICOAGULATION CLINIC REFERRAL RENEWAL REQUEST       An annual renewal order is required for all patients referred to [facility].?  Please review and sign the pended referral order for Noe Florence.       ANTICOAGULATION SUMMARY      Warfarin indication(s)   Atrial fibrillation (H) [I48.91] [I48.91]  Long-term (current) use of anticoagulants [Z79.01] [Z79.01]  Deep vein thrombosis (DVT) (H) [I82.409]  Atrial fibrillation  unspecified type (H) [I48.91]         Current goal range   1.5-2.5     Goal appropriate for indication? Please review since off protocol     Time in Therapeutic Range (TTR)  (Goal > 60%) 36.7%       Office visit with referring provider's group within last year yes on 3/28/23       Apryl Steiner RN  [facility]

## 2023-07-17 NOTE — PROGRESS NOTES
Goal range added to comment section from previous referral.    BEVERLEY LOONEY RN-BC  Anticoagulation Clinic  [phone]

## 2023-08-09 NOTE — PROGRESS NOTES
ANTICOAGULATION MANAGEMENT     Noe Florence 88 year old male is on warfarin with therapeutic INR result. (Goal INR 1.5-2.5    Recent labs: (last 7 days)     08/09/23  0000   INR 1.5*       ASSESSMENT     Source(s): Chart Review and Patient/Caregiver Call     Warfarin doses taken: Warfarin taken as instructed  Diet: No new diet changes identified  Medication/supplement changes: None noted  New illness, injury, or hospitalization: No  Signs or symptoms of bleeding or clotting: No  Previous result: Therapeutic last 2(+) visits  Additional findings: None       PLAN     Recommended plan for no diet, medication or health factor changes affecting INR     Dosing Instructions: Continue your current warfarin dose with next INR in 4 weeks       Summary  As of 8/9/2023      Full warfarin instructions:  5 mg every Sun, Wed, Fri; 2.5 mg all other days   Next INR check:  9/6/2023               Telephone call with Rica who verbalizes understanding and agrees to plan and who agrees to plan and repeated back plan correctly    Orders given to [facility] ([phone])    Education provided:   Taking warfarin: Importance of taking warfarin as instructed  Goal range and lab monitoring: goal range and significance of current result and Importance of therapeutic range    Plan made per [facility] anticoagulation protocol    Roya Manning RN  Anticoagulation Clinic  8/9/2023    _______________________________________________________________________     Anticoagulation Episode Summary       Current INR goal:  Other - see comment   TTR:  29.5 % (1 y)   Target end date:  Indefinite   Send INR reminders to:  [facility]    Indications    Atrial fibrillation (H) [I48.91] [I48.91]  Long-term (current) use of anticoagulants [Z79.01] [Z79.01]  Deep vein thrombosis (DVT) (H) [I82.409]  Atrial fibrillation  unspecified type (H) [I48.91]  Chronic atrial fibrillation (H) [I48.20]             Comments:  Goal range: 1.5-2.5              Anticoagulation Care Providers       Provider Role Specialty Phone number    Roberto Sarmiento MD Referring Internal Medicine [phone]

## 2023-09-06 NOTE — PATIENT INSTRUCTIONS
[facility] Medication Refill Request Information:  * Please contact your pharmacy regarding ANY request for medication refills.  ** [facility] Prescription Fax = [phone]  * Please allow 3 business days for routine medication refills.  * Please allow 5 business days for controlled substance medication refills.     [facility] Test Result notification information:  *You will be notified with in 7-10 days of your appointment day regarding the results of your test.  If you are on MyChart you will be notified as soon as the provider has reviewed the results and signed off on them.    [facility]: [phone]      [facility] Note     Patient Name: Durga Etienne  Encounter Date: 9/6/2023     Have you been cared for by a [facility] Dermatologist in the last 3 years and, if so, which description applies to you? NO. I am considered a "new" patient and must complete all patient intake questions. I am FEMALE/of child-bearing potential.    REVIEW OF SYSTEMS:  Have you recently had or currently have any of the following? Recent fever or chills? No  Any non-healing wound? No  Are you pregnant or planning to become pregnant? YES, Trying  Are you currently or planning to be nursing or breast feeding? No   PAST MEDICAL HISTORY:  Have you personally ever had or currently have any of the following? If "YES," then please provide more detail. Skin cancer (such as Melanoma, Basal Cell Carcinoma, Squamous Cell Carcinoma? No  Tuberculosis, HIV/AIDS, Hepatitis B or C: No  Systemic Immunosuppression such as Diabetes, Biologic or Immunotherapy, Chemotherapy, Organ Transplantation, Bone Marrow Transplantation No  Radiation Treatment No   FAMILY HISTORY:  Any "first degree relatives" (parent, brother, sister, or child) with the following? Skin Cancer, Pancreatic or Other Cancer? YES, Mother had breast cancer   PATIENT EXPERIENCE:    Do you want the Dermatologist to perform a COMPLETE skin exam today including a clinical examination under the "bra and underwear" areas? NO  If necessary, do we have your permission to call and leave a detailed message on your Preferred Phone number that includes your specific medical information?   Yes      Allergies   Allergen Reactions   • Citalopram Nausea Only     Annotation - [id number]: nausea   • Codeine Sulfate Tremor   • Flonase [Fluticasone] Facial Swelling   • Zoloft [Sertraline] Abdominal Pain      Current Outpatient Medications:   •  clonazePAM (KlonoPIN) 0.5 mg tablet, take 1 tablet by mouth up to three times a day if needed for anxiety, Disp: 90 tablet, Rfl: 2  •  cloNIDine (CATAPRES) 0.2 mg tablet, Take 1 tablet (0.2 mg total) by mouth daily at bedtime, Disp: 90 tablet, Rfl: 1  •  montelukast (SINGULAIR) 10 mg tablet, Take 1 tablet (10 mg total) by mouth daily at bedtime, Disp: 90 tablet, Rfl: 0  •  albuterol (PROVENTIL HFA,VENTOLIN HFA) 90 mcg/act inhaler, inhale 2 puffs by mouth and INTO THE LUNGS every 6 hours if needed for cough - use for 5 days (Patient not taking: Reported on 9/6/2023), Disp: , Rfl:   No current facility-administered medications for this visit. Whom besides the patient is providing clinical information about today's encounter? NO ADDITIONAL HISTORIAN (patient alone provided history)    Physical Exam and Assessment/Plan by Diagnosis:    1. KELOID SCAR    Physical Exam:  Anatomic Location Affected:  Mid chest  Morphological Description:  Hypertrophic scar        Additional History of Present Condition:  Present since 2019, it was excised in 2021 and radiated. Assessment and Plan:  Based on a thorough discussion of this condition and the management approach to it (including a comprehensive discussion of the known risks, side effects and potential benefits of treatment), the patient (family) agrees to implement the following specific plan:  Kenalog injection today    A keloid scar is a firm, smooth, hard growth due to spontaneous scar formation. It can arise soon after an injury, or develop months later. Keloids may be uncomfortable or itchy and extend well beyond the original wound. They may form on any part of the body, although the upper chest and shoulders are especially prone to them. The precise reason that wound healing sometimes leads to keloid formation is under investigation but is not yet clear. While most people never form keloids, others develop them after minor injuries, burns, insect bites and acne spots. Dark skinned people form keloids more easily than Caucasians.  A keloid is harmless to general health and does not change into skin cancer. The following measures are helpful in at least some patients. Emollients (creams and oils)  Polyurethane or silicone scar reduction patches  Silicone gel  Oral or topical tranilast (an inhibitor of collagen synthesis)  Pressure dressings  Surgical excision (but in keloids, excision may result in a new keloid even larger than the original one)  Intralesional corticosteroid injection, repeated every few weeks  Intralesional 5-fluorouracil  Cryotherapy  Superficial X-ray treatment soon after surgery. Pulsed dye laser   Skin needling  Subcision    Scar dressings should be worn for 12-24 hours per day, for at least 8 to 12 weeks, and perhaps for much longer. PROCEDURE:  INTRALESIONAL STEROID INJECTION (KENALOG INJECTION)    Purpose: Triamcinolone is a synthetic glucocorticoid corticosteroid that has marked anti-inflammatory action. It is prepared in sterile aqueous suspension suitable for injecting directly into a lesion on or immediately below the skin to treat a dermal inflammatory process. Indications: It is indicated for alopecia areata; inflammatory acne cysts; discoid lupus erythematosus; keloids and hypertrophic scars; inflammatory lesions of granuloma annulare, lichen planus, lichen simplex chronicus (neurodermatitis), psoriatic plaques, and other localized inflammatory skin conditions. Potential Side Effects: I understand that triamcinolone injection can potentially cause early and/or delayed adverse effects such as:   • Pain   • Impaired wound healing   • Increased hair growth   • Bleeding   • White or brown marks   • Steroid acne   • Infection   • Telangiectasia   • Skin thinning   • Cutaneous and subcutaneous lipoatrophy (most common) appearing as skin indentations or dimples around the injection sites a few weeks after treatment     PROCEDURE NOTE:  After verbal and written consent were obtained, the to-be-treated area was wiped and cleaned with rubbing alcohol 70%.       Then, a total of 1 mL of Kenalog CONCENTRATION:  10 mg/mL   (Lot# [id number]; Expiration 9/2024, NDC#: 7388-9608-40) was injected intralesionally into a total of 1 lesion/s on the following anatomic areas:  Mid chest using a 3-mL syringe and a 30-gauge needle. There was less than 1 mL of blood loss and little to no discomfort. The area was bandaged with a Band-aid. The patient tolerated the procedure well and remained in the office for observation. With no signs of an adverse reaction, the patient was eventually discharged from clinic.       Scribe Attestation    I,:  Steffany Pepe MA am acting as a scribe while in the presence of the attending physician.:       I,:  Brenda Pal MD personally performed the services described in this documentation    as scribed in my presence.:

## 2023-09-07 NOTE — PROGRESS NOTES
ANTICOAGULATION MANAGEMENT     Noe Florence 88 year old male is on warfarin with subtherapeutic INR result. (Goal INR  1.5-2.5 )    Recent labs: (last 7 days)     09/06/23  1140   INR 1.4*       ASSESSMENT     Source(s): Chart Review     Warfarin doses taken: Reviewed in chart  Diet: No new diet changes identified  Medication/supplement changes: None noted  New illness, injury, or hospitalization: No  Signs or symptoms of bleeding or clotting: No  Previous result: Therapeutic last 2(+) visits  Additional findings:     [facility] consult: last 2+ were therapeutic and he is slightly out of range. CHEST would tell me to continue current dose and recheck in 2 weeks. let's trial that and see if he doesn't pop back into the bottom of his range      PLAN     Recommended plan for no diet, medication or health factor changes affecting INR     Dosing Instructions: Continue your current warfarin dose with next INR in 2 weeks       Summary  As of 9/6/2023      Full warfarin instructions:  5 mg every Sun, Wed, Fri; 2.5 mg all other days   Next INR check:  9/20/2023               Detailed voice message left for friend, Rica with dosing instructions and follow up date.     Orders given to [facility] ([phone])- left with recheck INR date    Education provided:   Goal range and lab monitoring: goal range and significance of current result and Importance of following up at instructed interval  Contact [phone] with any changes, questions or concerns.     Plan made with [facility] Pharmacist Maddy LOONEY RN  Anticoagulation Clinic  9/7/2023    _______________________________________________________________________     Anticoagulation Episode Summary       Current INR goal:  Other - see comment   TTR:  22.7 % (1 y)   Target end date:  Indefinite   Send INR reminders to:  [facility]    Indications    Atrial fibrillation (H) [I48.91] [I48.91]  Long-term (current) use of anticoagulants [Z79.01]  [Z79.01]  Deep vein thrombosis (DVT) (H) [I82.409]  Atrial fibrillation  unspecified type (H) [I48.91]  Chronic atrial fibrillation (H) [I48.20]             Comments:  Goal range: 1.5-2.5             Anticoagulation Care Providers       Provider Role Specialty Phone number    Roberto Sarmiento MD Referring Internal Medicine [phone]

## 2023-09-20 NOTE — PROGRESS NOTES
Return call received from Sha's friend, Rica. She reports no changes or concerns. Agreed with continuing current maintenance dose with a recheck INR in 3 weeks with LC.     BEVERLEY LOONEY RN-BC  Anticoagulation Clinic  [phone]

## 2023-09-20 NOTE — PROGRESS NOTES
ANTICOAGULATION MANAGEMENT     Noe Florence 88 year old male is on warfarin with therapeutic INR result. (Goal INR  1.5-2.5 )    Recent labs: (last 7 days)     09/20/23  0000   INR 1.5*       ASSESSMENT     Source(s): Chart Review  Previous INR was Subtherapeutic  Medication, diet, health changes since last INR chart reviewed; none identified         PLAN     Recommended plan for no diet, medication or health factor changes affecting INR     Dosing Instructions: Continue your current warfarin dose with next INR in 3 weeks       Summary  As of 9/20/2023      Full warfarin instructions:  5 mg every Sun, Wed, Fri; 2.5 mg all other days   Next INR check:  10/11/2023               Detailed voice message left for friend Rica with dosing instructions and follow up date.     Orders given to [facility] ([phone])    Education provided:   Please call back if any changes to your diet, medications or how you've been taking warfarin    Plan made per [facility] anticoagulation protocol    Praveen Estrada RN  Anticoagulation Clinic  9/20/2023    _______________________________________________________________________     Anticoagulation Episode Summary       Current INR goal:  Other - see comment   TTR:  22.7 % (1 y)   Target end date:  Indefinite   Send INR reminders to:  [facility]    Indications    Atrial fibrillation (H) [I48.91] [I48.91]  Long-term (current) use of anticoagulants [Z79.01] [Z79.01]  Deep vein thrombosis (DVT) (H) [I82.409]  Atrial fibrillation  unspecified type (H) [I48.91]  Chronic atrial fibrillation (H) [I48.20]             Comments:  Goal range: 1.5-2.5             Anticoagulation Care Providers       Provider Role Specialty Phone number    Roberto Sarmiento MD Referring Internal Medicine [phone]

## 2023-09-26 NOTE — PROGRESS NOTES
ANTICOAGULATION MANAGEMENT     Noe Florence 88 year old male is on warfarin with therapeutic INR result. (Goal INR Other - see comment)    Recent labs: (last 7 days)     09/26/23  1433   INR 1.6*       ASSESSMENT     Source(s): Chart Review and Patient/Caregiver Call     Warfarin doses taken: Warfarin taken as instructed  Diet: No new diet changes identified  Medication/supplement changes: None noted  New illness, injury, or hospitalization: No  Signs or symptoms of bleeding or clotting: No  Previous result: Therapeutic last visit; previously outside of goal range  Additional findings: None       PLAN     Recommended plan for no diet, medication or health factor changes affecting INR     Dosing Instructions: Continue your current warfarin dose with next INR in 2 weeks       Summary  As of 9/26/2023      Full warfarin instructions:  5 mg every Sun, Wed, Fri; 2.5 mg all other days   Next INR check:  10/10/2023               Telephone call with Rica who verbalizes understanding and agrees to plan and who agrees to plan and repeated back plan correctly    Orders given to [facility] ([phone])    Education provided:   Taking warfarin: Importance of taking warfarin as instructed  Goal range and lab monitoring: goal range and significance of current result and Importance of therapeutic range    Plan made per [facility] anticoagulation protocol    Roya Manning RN  Anticoagulation Clinic  9/26/2023    _______________________________________________________________________     Anticoagulation Episode Summary       Current INR goal:  Other - see comment   TTR:  22.7 % (1 y)   Target end date:  Indefinite   Send INR reminders to:  [facility]    Indications    Atrial fibrillation (H) [I48.91] [I48.91]  Long-term (current) use of anticoagulants [Z79.01] [Z79.01]  Deep vein thrombosis (DVT) (H) [I82.409]  Atrial fibrillation  unspecified type (H) [I48.91]  Chronic atrial fibrillation (H) [I48.20]              Comments:  Goal range: 1.5-2.5             Anticoagulation Care Providers       Provider Role Specialty Phone number    Roberto Sarmiento MD Referring Internal Medicine [phone]

## 2023-09-26 NOTE — PROGRESS NOTES
HPI  88-year-old returns today with his wife for reevaluation.  He has had significant limitation in his ability to ambulate and move about in part related to foot deformity particularly on the right.  He has severe hammertoes here is had some open areas of irritation and this has limited his ability to get up and move about.  Apparently there is also been poor compliance with both his caregivers and him in terms of getting him off and moving.  We discussed keeping a log of this and shooting for standing and ambulation at least twice a day.  He is getting wound care coordinated through podiatry.  He needs support stockings on which help prevent the swelling and the edema and promote foot health.  This apparently is also not been accomplished on a daily regular basis.  Otherwise he is no longer following with oncology regarding his MGUS and we will reassess that along with his labs today.  Past Medical History:   Diagnosis Date    Advanced open-angle glaucoma     Atrial fibrillation (H)     CKD (chronic kidney disease), stage III (H) 2005    Colon cancer (H)     Stage II-B colon cancer    Coronary artery disease     s/p CABG x 2, JEREMY x 2    Diverticulitis     Hyperlipidemia     Hypertension     Ischemic cardiomyopathy     MGUS (monoclonal gammopathy of unknown significance)     Nonsenile cataract     BE    Osteoporosis     left hip fracture    Polymorphic ventricular tachycardia (H)     Polymyalgia rheumatica (H)     PVD (posterior vitreous detachment), both eyes 2005    S/P ablation of atrial flutter 6/20/14    CTI    Stented coronary artery     SVT (supraventricular tachycardia) (H)     PPM/AICD for NSVT    Upper leg DVT (deep venous thromboembolism), chronic (H)     Left    Weight loss, unintentional 2017    15 lb in 4 months     Past Surgical History:   Procedure Laterality Date    CATARACT IOL, RT/LT Bilateral     COLONOSCOPY  3/13/2014    Procedure: COMBINED COLONOSCOPY, SINGLE BIOPSY/POLYPECTOMY BY BIOPSY;;   Surgeon: Mary Gerber MD;  Location:  GI    COLONOSCOPY N/A 6/22/2015    Procedure: COLONOSCOPY;  Surgeon: Marilin Newman MD;  Location:  GI    COLONOSCOPY N/A 11/7/2018    Procedure: COMBINED COLONOSCOPY, SINGLE OR MULTIPLE BIOPSY/POLYPECTOMY BY BIOPSY;  Surgeon: Roberto Esteban MD;  Location:  GI    EP ICD GENERATOR REPLACEMENT DUAL N/A 12/11/2018    Procedure: EP ICD Generator Change Dual;  Surgeon: Deedee Baird MD;  Location:  HEART CARDIAC CATH LAB    H ABLATION ATRIAL FLUTTER      HEART CATH DRUG ELUTING STENT PLACEMENT  4/19/2012    JEREMY x 2 to LCx    IMPLANT IMPLANTABLE CARDIOVERTER DEFIBRILLATOR  5-    ppm/aicd    KNEE SURGERY      right and left knee surgeries    LAPAROSCOPIC ASSISTED COLECTOMY Right 2/1/2019    Procedure: Laparoscopic Converted to Open Transverse Colectomy with Lysis of Adhesions;  Surgeon: Chanelle Guzmán MD;  Location:  OR    LAPAROSCOPIC ASSISTED COLECTOMY LEFT (DESCENDING)  4/8/2014    Procedure: LAPAROSCOPIC ASSISTED COLECTOMY LEFT (DESCENDING);  Hand assisted Laparoscopic Sigmoid Colectomy , Laparoscopic mobilization of spleenic flexure *Latex Allergy*Anesthesia General with Block;  Surgeon: Chanelle Guzmán MD;  Location: [facility] OR    OPEN REDUCTION INTERNAL FIXATION RODDING INTRAMEDULLAR FEMUR FRACTURE TABLE Left 3/14/2019    Procedure: Open Reduction Internal Fixation Left Femur Intramedullar Nailing;  Surgeon: Srikanth Avalos MD;  Location:  OR    REPAIR VALVE MITRAL  2006     30-mm Medtronic Julian ring     SELECTIVE LASER TRABECULOPLASTY (SLT) OD (RIGHT EYE)  4/10, 1/12,+1/9/13    RE    SELECTIVE LASER TRABECULOPLASTY (SLT) OS (LEFT EYE)  5/2012    SHOULDER SURGERY      right rotator cuff    ZZC CABG, ARTERY-VEIN, FOUR  2006    LIMA-LAD, SVG-Rt PDA, SVG-OM2, SVG-Diag 1    ZZC CABG, VEIN, SINGLE  1994    1-vessel CABG, SVG->PDRCA      Family History   Problem Relation Age of Onset    C.A.D. Father      Anesthesia Reaction No family hx of     Crohn's Disease No family hx of     Ulcerative Colitis No family hx of     Cancer - colorectal No family hx of     Macular Degeneration No family hx of     Cancer No family hx of         No known family hx of skin cancer    Melanoma No family hx of     Skin Cancer No family hx of          Exam:  /61 (BP Location: Right arm, Patient Position: Sitting, Cuff Size: Adult Regular)   Pulse 60   SpO2 100%   The patient is alert, oriented with a clear sensorium.   Skin shows no lesions or rashes and good turgor.   Head is normocephalic and atraumatic.    Lungs are clear.   Heart shows normal S1 and S2 without murmur or gallop.    Extremities show 2 + pedal edema with hammertoes and hallux deformity of the right foot and a superficial healed abrasion over the base of the right fifth metatarsal.    Labs reviewed:  Results for orders placed or performed in visit on 09/26/23   INR point of care     Status: Abnormal   Result Value Ref Range    INR 1.6 (H) 0.9 - 1.1    Narrative    This test is intended for monitoring Coumadin therapy. Results are not accurate in patients with prolonged INR due to factor deficiency.   Comprehensive metabolic panel     Status: Abnormal   Result Value Ref Range    Sodium 141 135 - 145 mmol/L    Potassium 5.2 3.4 - 5.3 mmol/L    Carbon Dioxide (CO2) 19 (L) 22 - 29 mmol/L    Anion Gap 11 7 - 15 mmol/L    Urea Nitrogen 49.4 (H) 8.0 - 23.0 mg/dL    Creatinine 1.59 (H) 0.67 - 1.17 mg/dL    GFR Estimate 41 (L) >60 mL/min/1.73m2    Calcium 9.5 8.8 - 10.2 mg/dL    Chloride 111 (H) 98 - 107 mmol/L    Glucose 79 70 - 99 mg/dL    Alkaline Phosphatase 82 40 - 129 U/L    AST 16 0 - 45 U/L    ALT 10 0 - 70 U/L    Protein Total 7.1 6.4 - 8.3 g/dL    Albumin 4.0 3.5 - 5.2 g/dL    Bilirubin Total <0.2 <=1.2 mg/dL   CRP inflammation     Status: Abnormal   Result Value Ref Range    CRP Inflammation 14.50 (H) <5.00 mg/L   CK total     Status: Abnormal   Result Value Ref  Range    CK 30 (L) 39 - 308 U/L   Erythrocyte sedimentation rate auto     Status: Abnormal   Result Value Ref Range    Erythrocyte Sedimentation Rate 69 (H) 0 - 20 mm/hr   TSH with free T4 reflex     Status: Normal   Result Value Ref Range    TSH 1.20 0.30 - 4.20 uIU/mL   CBC with platelets and differential     Status: Abnormal   Result Value Ref Range    WBC Count 8.5 4.0 - 11.0 10e3/uL    RBC Count 2.84 (L) 4.40 - 5.90 10e6/uL    Hemoglobin 8.8 (L) 13.3 - 17.7 g/dL    Hematocrit 27.6 (L) 40.0 - 53.0 %    MCV 97 78 - 100 fL    MCH 31.0 26.5 - 33.0 pg    MCHC 31.9 31.5 - 36.5 g/dL    RDW 13.2 10.0 - 15.0 %    Platelet Count 191 150 - 450 10e3/uL    % Neutrophils 77 %    % Lymphocytes 8 %    % Monocytes 9 %    % Eosinophils 4 %    % Basophils 1 %    % Immature Granulocytes 1 %    NRBCs per 100 WBC 0 <1 /100    Absolute Neutrophils 6.6 1.6 - 8.3 10e3/uL    Absolute Lymphocytes 0.7 (L) 0.8 - 5.3 10e3/uL    Absolute Monocytes 0.7 0.0 - 1.3 10e3/uL    Absolute Eosinophils 0.4 0.0 - 0.7 10e3/uL    Absolute Basophils 0.0 0.0 - 0.2 10e3/uL    Absolute Immature Granulocytes 0.1 <=0.4 10e3/uL    Absolute NRBCs 0.0 10e3/uL   Total Protein, Serum for ELP     Status: Normal   Result Value Ref Range    Total Protein Serum for ELP 6.7 6.4 - 8.3 g/dL   Protein Electrophoresis, Serum     Status: Abnormal   Result Value Ref Range    Albumin 3.7 3.7 - 5.1 g/dL    Alpha 1 0.4 0.2 - 0.4 g/dL    Alpha 2 0.8 0.5 - 0.9 g/dL    Beta Globulin 0.9 0.6 - 1.0 g/dL    Gamma Globulin 0.9 0.7 - 1.6 g/dL    Monoclonal Peak 0.1 (H) <=0.0 g/dL    ELP Interpretation       Small monoclonal protein (0.1 g/dL) seen in the gamma fraction. Previously characterized in our laboratory on 5/9/23 as a monoclonal IgM immunoglobulin of kappa light chain type. Pathologic significance requires clinical correlation. Marj Easton M.D., Ph.D.   CBC with Platelets & Differential     Status: Abnormal    Narrative    The following orders were created for panel order CBC  with Platelets & Differential.  Procedure                               Abnormality         Status                     ---------                               -----------         ------                     CBC with platelets and d...[[id number]]  Abnormal            Final result                 Please view results for these tests on the individual orders.   Protein electrophoresis     Status: Abnormal    Narrative    The following orders were created for panel order Protein electrophoresis.  Procedure                               Abnormality         Status                     ---------                               -----------         ------                     Total Protein, Serum for...[[id number]]  Normal              Final result               Protein Electrophoresis,...[[id number]]  Abnormal            Final result                 Please view results for these tests on the individual orders.       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  Also encouraged him to discuss with his mental health provider medication reductions if possible  Over 30 minutes spent on the day of service in chart review, patient contact, record completion and review and notification of lab reports    This note was completed using Dragon voice recognition  software.      Roberto Sarmiento MD  General Internal Medicine  [facility]  [phone]

## 2023-09-26 NOTE — PATIENT INSTRUCTIONS
It was a pleasure to see you in clinic today, please do not hesitate to call us for any questions or concerns.  Your next automatic remote ICD check from home is scheduled for 12/14/2023.    Trena Enrique RN    Electrophysiology Nurse Clinician  [facility]    During Business Hours Please Call:  [phone]  After Hours Please Call:  [phone] - select option #4 and ask for job code [phone]

## 2023-09-26 NOTE — NURSING NOTE
Noe Florence is a 88 year old male patient that presents today in clinic for the following:    Chief Complaint   Patient presents with    RECHECK     Follow up.  Medication review/refill.     The patient's allergies and medications were reviewed as noted. A set of vitals were recorded as noted without incident: /61 (BP Location: Right arm, Patient Position: Sitting, Cuff Size: Adult Regular)   Pulse 60   SpO2 100% . The patient does not have any other questions for the provider.    Keri Vallejo, EMT at 1:07 PM on 9/26/2023.  Primary care clinic: [phone]

## 2023-09-29 NOTE — TELEPHONE ENCOUNTER
"Spoke with patients wife informed her Dr. Green has sent an antibiotic to patients pharmacy and also informed her to have patient reduce the warfarin dose in half during the 3 days.         \"We will treat with Septra for 3 days.  He should reduce the warfarin dose in half during the 3 days  JL   \"    Pamela Livingston RN on 9/29/2023 at 4:44 PM                                                                                           "

## 2023-09-29 NOTE — TELEPHONE ENCOUNTER
Health Call Center    Phone Message    May a detailed message be left on voicemail: yes     Reason for Call: Symptoms or Concerns     If patient has red-flag symptoms, warm transfer to triage line    Current symptom or concern: possible UTI    Symptoms have been present for:  several day(s)    Has patient previously been seen for this? Yes        Date: per SO this past summer    Are there any new or worsening symptoms? Yes: patients SO calling stating she believes patient has a UTI and forgot to mention at last visit. Patient did take a test from the pharmacy and its positive. Please call thank you.     Action Taken: Message routed to:  [facility] pcc    Travel Screening: Not Applicable                       We will treat with Septra for 3 days.  He should reduce the warfarin dose in half during the 3 days  JL

## 2023-09-29 NOTE — PROGRESS NOTES
ANTICOAGULATION  MANAGEMENT     Interacting Medication Review    Interacting medication(s): Sulfamethoxazole-Trimethoprim (Bactrim) with warfarin.    Duration: 3 days (9/29 to 10/1)    Indication: UTI    New medication?: Yes, interaction may increase INR and risk of bleeding. With Sulfamethoxazole-Trimethoprim, [facility] protocol Appendix G recommends empiric reduction of warfarin dose in therapeutic/supratherapeutic patients.        PLAN     Temporarily adjust warfarin dose during interaction (10% change) with next INR in 5 days        Summary  As of 9/29/2023      Full warfarin instructions:  10/1: 2.5 mg; Otherwise 5 mg every Sun, Wed, Fri; 2.5 mg all other days   Next INR check:  10/4/2023               Telephone call with Rica who verbalizes understanding and agrees to plan and who agrees to plan and repeated back plan correctly    New recheck date updated on anticoagulation calendar     Plan made with [facility] Pharmacist Lizett Manning RN  Anticoagulation Clinic

## 2023-09-29 NOTE — PROGRESS NOTES
Rica calls back to report that Dr. Sarmiento instructed her to have Bill take half his warfarin dose for the next 3 days while taking Bactrim. Since patient takes 5mg on Fri and Sun writer will have him take 2.5mg tonlacey and Sun so patient doesn't have to quarter the tablets.

## 2023-10-04 NOTE — PROGRESS NOTES
ANTICOAGULATION MANAGEMENT     Noe Florence 88 year old male is on warfarin with therapeutic INR result. (Goal INR Other -1.5-2.5)    Recent labs: (last 7 days)     10/04/23  0000   INR 1.9*       ASSESSMENT     Source(s): Chart Review and Patient/Caregiver Call     Warfarin doses taken: Warfarin taken as instructed  Diet: Decreased greens/vitamin K in diet; plans to resume previous intake  Medication/supplement changes:  Patient just finished a 3 day course of Bactrim  New illness, injury, or hospitalization: Yes: Recent UTI  Signs or symptoms of bleeding or clotting: No  Previous result: Therapeutic last 2(+) visits  Additional findings:  Sha did have 1 beer yesterday       PLAN     Recommended plan for temporary change(s) affecting INR     Dosing Instructions: Continue your current warfarin dose with next INR in 1 week       Summary  As of 10/4/2023      Full warfarin instructions:  5 mg every Sun, Wed, Fri; 2.5 mg all other days   Next INR check:  10/11/2023               Telephone call with Rica who verbalizes understanding and agrees to plan and who agrees to plan and repeated back plan correctly    Orders given to [facility] ([phone])    Education provided:   Taking warfarin: Importance of taking warfarin as instructed  Goal range and lab monitoring: goal range and significance of current result and Importance of therapeutic range    Plan made per [facility] anticoagulation protocol    Roya Manning RN  Anticoagulation Clinic  10/4/2023    _______________________________________________________________________     Anticoagulation Episode Summary       Current INR goal:  Other - see comment   TTR:  22.7 % (1 y)   Target end date:  Indefinite   Send INR reminders to:  [facility]    Indications    Atrial fibrillation (H) [I48.91] [I48.91]  Long-term (current) use of anticoagulants [Z79.01] [Z79.01]  Deep vein thrombosis (DVT) (H) [I82.409]  Atrial fibrillation  unspecified type (H)  [I48.91]  Chronic atrial fibrillation (H) [I48.20]             Comments:  Goal range: 1.5-2.5             Anticoagulation Care Providers       Provider Role Specialty Phone number    Roberto Sarmiento MD [facility] Internal Medicine [phone]

## 2023-10-10 NOTE — PROGRESS NOTES
ANTICOAGULATION MANAGEMENT     Noe Florence 88 year old male is on warfarin with therapeutic INR result. (Goal INR  1.5-2.5 )    Recent labs: (last 7 days)     10/10/23  1126   INR 1.9*       ASSESSMENT     Source(s): Chart Review and Patient/Caregiver Call     Warfarin doses taken: Warfarin taken as instructed  Diet: No new diet changes identified  Medication/supplement changes: None noted  New illness, injury, or hospitalization: No  Signs or symptoms of bleeding or clotting: No  Previous result: Therapeutic last 2(+) visits  Additional findings: None       PLAN     Recommended plan for no diet, medication or health factor changes affecting INR     Dosing Instructions: Continue your current warfarin dose with next INR in 2 weeks       Summary  As of 10/10/2023      Full warfarin instructions:  5 mg every Sun, Wed, Fri; 2.5 mg all other days   Next INR check:  10/24/2023               Telephone call with Rica BANERJEE who agrees to plan and repeated back plan correctly    Orders given to [facility] ([phone])    Education provided:   Goal range and lab monitoring: goal range and significance of current result and Importance of following up at instructed interval  Contact [phone] with any changes, questions or concerns.     Plan made per [facility] anticoagulation protocol    BEVERLEY LOONEY RN  Anticoagulation Clinic  10/10/2023    _______________________________________________________________________     Anticoagulation Episode Summary       Current INR goal:  Other - see comment   TTR:  21.7 % (1 y)   Target end date:  Indefinite   Send INR reminders to:  [facility]    Indications    Atrial fibrillation (H) [I48.91] [I48.91]  Long-term (current) use of anticoagulants [Z79.01] [Z79.01]  Deep vein thrombosis (DVT) (H) [I82.409]  Atrial fibrillation  unspecified type (H) [I48.91]  Chronic atrial fibrillation (H) [I48.20]             Comments:  Goal range: 1.5-2.5             Anticoagulation  Care Providers       Provider Role Specialty Phone number    Roberto Sarmiento MD Referring Internal Medicine [phone]

## 2023-10-13 NOTE — PATIENT INSTRUCTIONS
Thank you so much for choosing us for your care. It was a pleasure to see you at the vascular clinic today.     Follow-up recommendations: We will see you back in 3 months. Please do not hesitate to reach out to us in the meantime with any concerns. Our schedulers will get in touch with you to set up your follow-up visit.    Additional testing/imaging ordered today: US aorta/iliac in 3 months.      Our scheduling team will get in touch with you to set up any follow-up testing/imaging and/or appointments. Please be aware that any testing/imaging recommended today will need to completed prior to your next visit with the provider. If testing/imaging is not completed prior to your next visit, your visit may be rescheduled.        If you have any questions, please contact our clinic directly at [phone] and ask for the nurse. We also encourage the use of [employer] to communicate with your HealthCare Provider.      If you have an urgent need after business hours (8:00 am to 4:30 pm) please call [phone], option 4, and ask for the vascular attending on call. For non-urgent after hours needs, please call the vascular clinic at [phone]. For scheduling needs, please call our clinic directly at [phone].    ===========================================================    Your provider has recommended you start wearing compression stockings. You may get these at any [employer] supply store. You may also purchase stockings online through outside vendors. If you order online, be sure you are purchasing the correct compression stockings based on the prescription from your doctor. If you are unsure what to order, please call our clinic at [phone] and ask to speak with a nurse or send us a zPerfect[employer]iftt message.    Online vendors  [employer]: [employer]: [employer]  [employer]: [employer]    ===========================================================  Learning About Using  Compression Stockings  Compression stockings help prevent blood and fluid from pooling in the legs. They may be used for problems like varicose veins, skin ulcers, and deep vein thrombosis (blood clot in the leg). There are different types of stockings, and they need to fit right. Your doctor will recommend what you need.  5 tips for putting on compression stockings    If your stockings are new, wash them in cold water.  This can make them easier to put on.     Put on your stockings early in the morning, if you can.  This is when you have the least swelling in your legs.     Wear them every day while you're awake, especially while you're on your feet.       Try putting silicone lotion or cornstarch on your legs.  This can make it easier to slide the stockings on.     To help your , try using rubber gloves.  Ask your doctor about other tools to help if it's hard to put on the stockings.   How to put on compression stockings  It can be a little tricky to put on compression stockings at first. But after you practice a few times, it may get easier. Here are the details.    Sit on a firm surface where your feet can touch the ground.   Hold the top of the stocking with one hand. Then with your other hand, reach in and grab the heel.     When you have a firm  on the heel, pull your hand back up through the stocking, turning it inside out as far as the heel.   Put your toes in as far as they will go. Then center your heel in the stocking and pull it up slightly, just around your heel.     Use both hands to grasp the folded part of the stocking about 2 inches below the fold. Pull that section up over your ankle.   Next, from above your ankle, grasp the folded part of the stocking about 2 inches below the fold. Pull that section up.     Continue pulling the stocking up in short sections until it is in its final position. The final position may be below your knee. Or it might be above your knee.   Run your hands over  "the stocking to smooth it out.   Where can you learn more?  Go to https://www.health[employer].net/patiented  Enter J166 in the search box to learn more about \"Learning About Using Compression Stockings.\"  Current as of: December 19, 2022               Content Version: 13.7    9841-8494 [employer].   Care instructions adapted under license by your healthcare professional. If you have questions about a medical condition or this instruction, always ask your healthcare professional. [employer] disclaims any warranty or liability for your use of this information.        "

## 2023-10-13 NOTE — LETTER
10/13/2023       RE: Noe Florence  [address] [place] [address]     Dear Colleague,    Thank you for referring your patient, Noe Florence, to the [facility] [place] [facility]. Please see a copy of my visit note below.    VASCULAR MEDICINE CONSULT NOTE          LOCATION:  [facility]        Date of Service: 10/13/2023      Primary Care Provider: Roberto Sarmiento  Referring provider;  Roberto Sarmiento      Reason for the visit/chief complaint:   Lower extremity swelling      HPI:  Noe Florence is a pleasant 88 year old male who presents to our vascular medicine clinic accompanied by his significant other Rica and his caregiver Bandar.    Mr. Florence was contributing very minimally to the conversation.  Most of the history was taken from his significant other and caregiver.  He has had significant decrease in his mobility over the past couple years.  He is currently completely immobile apart from, sitting in a chair for the majority of the day, needs help with transferring to bed and sleeps in a bed at night.  During the day while sitting in a chair, his legs are typically in a dependent position.  They have noted swelling to both lower extremities that is worse by the end of the day and improves in the morning.  Denies lower extremity pain or change in color. DELFINO non compressible from 12/2019 with duplex ultrasound showing no hemodynamically significant stenosis.    He has very minimal smoking history quit at age 34.    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       Past medical history, surgical history, medications, family history, social history and allergies were reviewed. Pertinent points mentioned under HPI.      OBJECTIVE:    Vital signs:  /74 (BP Location: Right arm, Patient Position: Sitting, Cuff Size: Adult Large)   Pulse 63   SpO2 94%   Wt Readings from Last 1 Encounters:   12/26/22 153 lb 3.2 oz     There is no height or weight on file to calculate BMI.    Physical exam:  General appearance: Pleasant male in no apparent distress.  In a wheelchair, able to speak but speaks very minimally.  HEENT: NC/AT.    Neck: Carotids +2/2 bilaterally without bruits.  No jugular venous distension.   Heart: Normal S1 and S2  Chest: Clear to auscultation   Abdomen: not examined due to position (sitting in chair)  Neurological: Alert, awake     Extremities: Mild pedal edema noted bilaterally .Hammertoes   PT and DP unable to palpate 0/2 bilaterally.  Skin: Stasis dermatitis noted bilaterally.  Minimal livedo reticularis on the right lower extremity. Band-Aids on the right lateral foot with no open ulcer        DIAGNOSTIC STUDIES:   Labs and diagnostics reviewed including outside records. Pertinent points are mentioned under HPI and assessment and plan sections.        ASSESSMENT AND PLAN:    Bilateral lower extremity edema secondary to venous stasis and immobility  History of left lower extremity DVT: Provoked?  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  Discussed the importance of leg elevation while sitting all day.  Discussed trying recliners or elevating with pillows.  Repeat US aorta iliac before next visit.   Follow-up in 3 months.    It was a pleasure meeting with Mr. Florence in our clinic today.          Again, thank you for allowing me to participate in the care of your patient.      Sincerely,    Rajiv Pollock MD

## 2023-10-13 NOTE — PROGRESS NOTES
VASCULAR MEDICINE CONSULT NOTE          LOCATION:  [facility]        Date of Service: 10/13/2023      Primary Care Provider: Roberto Sarmiento  Referring provider;  Roberto Sarmiento      Reason for the visit/chief complaint:   Lower extremity swelling      HPI:  Noe Florence is a pleasant 88 year old male who presents to our vascular medicine clinic accompanied by his significant other Rica and his caregiver Bandar.    Mr. Florence was contributing very minimally to the conversation.  Most of the history was taken from his significant other and caregiver.  He has had significant decrease in his mobility over the past couple years.  He is currently completely immobile apart from, sitting in a chair for the majority of the day, needs help with transferring to bed and sleeps in a bed at night.  During the day while sitting in a chair, his legs are typically in a dependent position.  They have noted swelling to both lower extremities that is worse by the end of the day and improves in the morning.  Denies lower extremity pain or change in color. DELFINO non compressible from 12/2019 with duplex ultrasound showing no hemodynamically significant stenosis.    He has very minimal smoking history quit at age 34.    His medical history includes previous history of left lower extremity DVT (left popliteal vein July 2019, left mid femoral vein March 2019)-unclear circumstances, during recurrent hospitalizations?,  CAD status post CABG x1 in 1994 and JEREMY-LCx 2012, atrial fibrillation on warfarin, Vtach s/p ICD, aneurysmal dilation of the left common iliac artery measuring 2.5 cm that has remained stable with no evidence of abdominal aortic aneurysm, CKD stage III, MGUS, polymyalgia rheumatica and history of colon cancer status post left colectomy 2019      REVIEW OF SYSTEMS:    A 12 point ROS was reviewed and is negative except what is mentioned in HPI.        Past medical history, surgical history, medications, family history, social history and allergies were reviewed. Pertinent points mentioned under HPI.      OBJECTIVE:    Vital signs:  /74 (BP Location: Right arm, Patient Position: Sitting, Cuff Size: Adult Large)   Pulse 63   SpO2 94%   Wt Readings from Last 1 Encounters:   12/26/22 153 lb 3.2 oz     There is no height or weight on file to calculate BMI.    Physical exam:  General appearance: Pleasant male in no apparent distress.  In a wheelchair, able to speak but speaks very minimally.  HEENT: NC/AT.    Neck: Carotids +2/2 bilaterally without bruits.  No jugular venous distension.   Heart: Normal S1 and S2  Chest: Clear to auscultation   Abdomen: not examined due to position (sitting in chair)  Neurological: Alert, awake     Extremities: Mild pedal edema noted bilaterally .Hammertoes   PT and DP unable to palpate 0/2 bilaterally.  Skin: Stasis dermatitis noted bilaterally.  Minimal livedo reticularis on the right lower extremity. Band-Aids on the right lateral foot with no open ulcer        DIAGNOSTIC STUDIES:   Labs and diagnostics reviewed including outside records. Pertinent points are mentioned under HPI and assessment and plan sections.        ASSESSMENT AND PLAN:    Bilateral lower extremity edema secondary to venous stasis and immobility  History of left lower extremity DVT: Provoked?  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  Discussed the importance of leg elevation while sitting all day.  Discussed trying recliners or elevating with pillows.  Repeat US aorta iliac before next visit.   Follow-up in 3 months.    It was a pleasure  meeting with Mr. Florence in our clinic today.      Rajiv Pollock MD  Vascular Medicine  October 13, 2023

## 2023-10-16 NOTE — TELEPHONE ENCOUNTER
ANTICOAGULATION MANAGEMENT:  Medication Refill    Anticoagulation Summary  As of 10/10/2023      Warfarin maintenance plan:  5 mg (5 mg x 1) every Sun, Wed, Fri; 2.5 mg (5 mg x 0.5) all other days   Next INR check:  10/24/2023   Target end date:  Indefinite    Indications    Atrial fibrillation (H) [I48.91] [I48.91]  Long-term (current) use of anticoagulants [Z79.01] [Z79.01]  Deep vein thrombosis (DVT) (H) [I82.409]  Atrial fibrillation  unspecified type (H) [I48.91]  Chronic atrial fibrillation (H) [I48.20]                 Anticoagulation Care Providers       Provider Role Specialty Phone number    Roberto Sarmiento MD Referring Internal Medicine [phone]            Refill Criteria    Visit with referring provider/group: Meets criteria: office visit within referring provider group in the last 1 year on 09/26/2023    [facility] referral signed last signed: 07/17/2023; within last year: Yes    Lab monitoring not exceeding 2 weeks overdue: Yes    Noe meets all criteria for refill. Rx instructions and quantity supplied updated to match patient's current dosing plan. Warfarin 90 day supply with 1 refill granted per [facility] protocol     Toribio Hackett RN  Anticoagulation Clinic

## 2023-10-24 NOTE — PROGRESS NOTES
ANTICOAGULATION MANAGEMENT     Noe Florence 88 year old male is on warfarin with subtherapeutic INR result. (Goal INR Other - 1.5-2.5)    Recent labs: (last 7 days)     10/24/23  0000   INR 1.3*       ASSESSMENT     Source(s): Chart Review and Patient/Caregiver Call     Warfarin doses taken: Warfarin taken as instructed  Diet: Increased greens/vitamin K in diet; plans to resume previous intake  Medication/supplement changes: None noted  New illness, injury, or hospitalization: No  Signs or symptoms of bleeding or clotting: No  Previous result: Therapeutic last 2(+) visits  Additional findings: None       PLAN     Recommended plan for temporary change(s) affecting INR     Dosing Instructions: booster dose then continue your current warfarin dose with next INR in 1-2 weeks       Summary  As of 10/24/2023      Full warfarin instructions:  10/24: 5 mg; Otherwise 5 mg every Sun, Wed, Fri; 2.5 mg all other days   Next INR check:  11/7/2023               Telephone call with Rica who verbalizes understanding and agrees to plan and who agrees to plan and repeated back plan correctly    Orders given to [facility] ([phone])    Education provided:   Taking warfarin: Importance of taking warfarin as instructed  Goal range and lab monitoring: goal range and significance of current result and Importance of therapeutic range  Dietary considerations: importance of consistent vitamin K intake    Plan made per [facility] anticoagulation protocol    Roya Manning RN  Anticoagulation Clinic  10/24/2023    _______________________________________________________________________     Anticoagulation Episode Summary       Current INR goal:  Other - see comment   TTR:  17.9% (1 y)   Target end date:  Indefinite   Send INR reminders to:  [facility]    Indications    Atrial fibrillation (H) [I48.91] [I48.91]  Long-term (current) use of anticoagulants [Z79.01] [Z79.01]  Deep vein thrombosis (DVT) (H)  [I82.409]  Atrial fibrillation  unspecified type (H) [I48.91]  Chronic atrial fibrillation (H) [I48.20]             Comments:  Goal range: 1.5-2.5             Anticoagulation Care Providers       Provider Role Specialty Phone number    Roberto Sarmiento MD [facility] Internal Medicine [phone]

## 2023-11-06 NOTE — PROGRESS NOTES
ANTICOAGULATION MANAGEMENT     Noe Florence 88 year old male is on warfarin with therapeutic INR result. (Goal INR  1.5-2.5 )    Recent labs: (last 7 days)     11/06/23  1530   INR 1.6*       ASSESSMENT     Source(s): Chart Review and Patient/Caregiver Call     Warfarin doses taken: Booster dose(s) recently taken which may be affecting INR  Diet: No new diet changes identified  Medication/supplement changes: None noted  New illness, injury, or hospitalization: No  Signs or symptoms of bleeding or clotting: No  Previous result: Subtherapeutic  Additional findings: None       PLAN     Recommended plan for no diet, medication or health factor changes affecting INR     Dosing Instructions: Continue your current warfarin dose with next INR in 3 weeks       Summary  As of 11/6/2023      Full warfarin instructions:  5 mg every Sun, Wed, Fri; 2.5 mg all other days   Next INR check:  11/27/2023               Telephone call with Nicolette who verbalizes understanding and agrees to plan and who agrees to plan and repeated back plan correctly    Orders given to [facility] ([phone])    Education provided:   Taking warfarin: purpose of warfarin and how it works, take warfarin at same time each day; preferably in the evening, prescribed tablet strength and color, importance of following [facility] instructions vs instructions on the prescription bottle, and Importance of taking warfarin as instructed  Symptom monitoring: monitoring for bleeding signs and symptoms and monitoring for clotting signs and symptoms  Importance of notifying anticoagulation clinic for: changes in medications; a sooner lab recheck maybe needed, diarrhea, nausea/vomiting, reduced intake, cold/flu, and/or infections; a sooner lab recheck maybe needed, and if you did not receive dosing instructions on the same day as your labs were checked    Plan made per [facility] anticoagulation protocol    Ciro Bajwa, RN  Anticoagulation  Clinic  11/6/2023    _______________________________________________________________________     Anticoagulation Episode Summary       Current INR goal:  Other - see comment   TTR:  16.5% (1 y)   Target end date:  Indefinite   Send INR reminders to:  [facility]    Indications    Atrial fibrillation (H) [I48.91] [I48.91]  Long-term (current) use of anticoagulants [Z79.01] [Z79.01]  Deep vein thrombosis (DVT) (H) [I82.409]  Atrial fibrillation  unspecified type (H) [I48.91]  Chronic atrial fibrillation (H) [I48.20]             Comments:  Goal range: 1.5-2.5             Anticoagulation Care Providers       Provider Role Specialty Phone number    Roberto Sarmiento MD Referring Internal Medicine [phone]

## 2023-11-08 NOTE — TELEPHONE ENCOUNTER
Patient Contacted and schedule the following:    Appointment type: Return  Provider: Dr. Petersen  Return date: 12/21/23  Specialty phone number: [phone]

## 2023-11-15 PROBLEM — N28.9 IMPAIRED RENAL FUNCTION: Status: ACTIVE | Noted: 2023-01-01

## 2023-11-15 NOTE — PATIENT INSTRUCTIONS
Pls advise on notation below  LOV on 1/31,  flexeril was started 1/31 as new med  Thank you so much for choosing us for your care. It was a pleasure to see you at the vascular clinic today.     Follow-up recommendations: We will review your imaging once complete and determine next steps.    Additional testing/imaging ordered today: CTA abdomen/pelvis with pre-hydration due to impaired kidney function.       Our scheduling team will get in touch with you to set up any follow-up testing/imaging and/or appointments. Please be aware that any testing/imaging recommended today will need to completed prior to your next visit with the provider. If testing/imaging is not completed prior to your next visit, your visit may be rescheduled.        If you have any questions, please contact our clinic directly at [phone] and ask for the nurse. We also encourage the use of [employer] to communicate with your HealthCare Provider.      If you have an urgent need after business hours (8:00 am to 4:30 pm) please call [phone], option 4, and ask for the vascular attending on call. For non-urgent after hours needs, please call the vascular clinic at [phone]. For scheduling needs, please call our clinic directly at [phone].

## 2023-11-15 NOTE — LETTER
11/15/2023       RE: Noe Florence  [address] [facility] [address]     Dear Colleague,    Thank you for referring your patient, Noe Florence, to the [facility] [place] [facility]. Please see a copy of my visit note below.    Vascular & Endovascular Surgery Clinic Consultation   Vascular Surgeon: Wu Ellison MD, RPVI       Location:  [facility]    Noe Florence  Medical Record #:  [id number]  YOB: 1935  Age:  88 year old     Date of Service: 11/15/2023    Referring Provider: Rajiv Pollock.  Primary Care Provider: Roberto Sarmiento    Chief Complaint/Reason for Visit: Iliac aneurysm, Lower extremity wounds.    HPI:  Mr. Florence is a pleasant 88 year old male seen today with his wife Rica for an asymptomatic iliac aneurysm and lower extremity wounds.  The patient is minimally conversant  and thus far, information has been obtained with the help of his wife as an independent historian.  He has multiple caregivers at home. He barely walks and thus has no claudication, spending all day in the a chair.  He has some restless leg sensations that force him to move his legs at night and occasionally sit up at the side of the bed with his legs down at night but not ischemic pain that requires dangling.  He has wounds on his toes.      CV risk factors include DVT, CAD status post CABG and PCI, afib on warfarin, Vtach s/p ICD, Left common iliac aneurysm, CKD3, MGUS, polymyalgia rheumatica and is a remote smoker     Relevant surgical history:  - CAD s/p CABG in 1994 and again in 2006 and subsequent PCI in 2012    Review of Systems:    Not obtained as he does not answer this many questions.    Medical/Surgical History:    Past Medical History:   Diagnosis Date    Advanced open-angle glaucoma     Atrial fibrillation (H)     CKD (chronic kidney disease), stage III (H) 2005    Colon  cancer (H)     Stage II-B colon cancer    Coronary artery disease     s/p CABG x 2, JEREMY x 2    Diverticulitis     Hyperlipidemia     Hypertension     Ischemic cardiomyopathy     MGUS (monoclonal gammopathy of unknown significance)     Nonsenile cataract     BE    Osteoporosis     left hip fracture    Polymorphic ventricular tachycardia (H)     Polymyalgia rheumatica (H24)     PVD (posterior vitreous detachment), both eyes 2005    S/P ablation of atrial flutter 6/20/14    CTI    Stented coronary artery     SVT (supraventricular tachycardia)     PPM/AICD for NSVT    Upper leg DVT (deep venous thromboembolism), chronic (H)     Left    Weight loss, unintentional 2017    15 lb in 4 months         Past Surgical History:   Procedure Laterality Date    CATARACT IOL, RT/LT Bilateral     COLONOSCOPY  3/13/2014    Procedure: COMBINED COLONOSCOPY, SINGLE BIOPSY/POLYPECTOMY BY BIOPSY;;  Surgeon: Mary Gerber MD;  Location:  GI    COLONOSCOPY N/A 6/22/2015    Procedure: COLONOSCOPY;  Surgeon: Marilin eNwman MD;  Location:  GI    COLONOSCOPY N/A 11/7/2018    Procedure: COMBINED COLONOSCOPY, SINGLE OR MULTIPLE BIOPSY/POLYPECTOMY BY BIOPSY;  Surgeon: Roberto Esteban MD;  Location:  GI    EP ICD GENERATOR REPLACEMENT DUAL N/A 12/11/2018    Procedure: EP ICD Generator Change Dual;  Surgeon: Deedee Baird MD;  Location:  HEART CARDIAC CATH LAB    H ABLATION ATRIAL FLUTTER      HEART CATH DRUG ELUTING STENT PLACEMENT  4/19/2012    JEREMY x 2 to LCx    IMPLANT IMPLANTABLE CARDIOVERTER DEFIBRILLATOR  5-    ppm/aicd    KNEE SURGERY      right and left knee surgeries    LAPAROSCOPIC ASSISTED COLECTOMY Right 2/1/2019    Procedure: Laparoscopic Converted to Open Transverse Colectomy with Lysis of Adhesions;  Surgeon: Chanelle Guzmán MD;  Location:  OR    LAPAROSCOPIC ASSISTED COLECTOMY LEFT (DESCENDING)  4/8/2014    Procedure: LAPAROSCOPIC ASSISTED COLECTOMY LEFT (DESCENDING);  Hand assisted  Laparoscopic Sigmoid Colectomy , Laparoscopic mobilization of spleenic flexure *Latex Allergy*Anesthesia General with Block;  Surgeon: Chanelle Guzmán MD;  Location: [facility] OR    OPEN REDUCTION INTERNAL FIXATION RODDING INTRAMEDULLAR FEMUR FRACTURE TABLE Left 3/14/2019    Procedure: Open Reduction Internal Fixation Left Femur Intramedullar Nailing;  Surgeon: Srikanth Avalos MD;  Location: [facility] OR    REPAIR VALVE MITRAL  2006     30-mm Medtronic Julian ring     SELECTIVE LASER TRABECULOPLASTY (SLT) OD (RIGHT EYE)  4/10, 1/12,+1/9/13    RE    SELECTIVE LASER TRABECULOPLASTY (SLT) OS (LEFT EYE)  5/2012    SHOULDER SURGERY      right rotator cuff    ZZC CABG, ARTERY-VEIN, FOUR  2006    LIMA-LAD, SVG-Rt PDA, SVG-OM2, SVG-Diag 1    [facility] CABG, VEIN, SINGLE  1994    1-vessel CABG, SVG->PDRCA         Allergies:     Allergies   Allergen Reactions    Latex Rash     Rash        Medications:    Current Outpatient Medications   Medication    acetaminophen (TYLENOL) 500 MG tablet    alendronate (FOSAMAX) 70 MG tablet    bacitracin 500 UNIT/GM ophthalmic ointment    baclofen (LIORESAL) 10 MG tablet    calcium carbonate 500 mg, elemental, (OSCAL;OYSTER SHELL CALCIUM) 500 MG tablet    cephALEXin (KEFLEX) 500 MG capsule    cholecalciferol 1000 units TABS    COMPRESSION STOCKINGS    cyanocobalamin (VITAMIN B-12) 1000 MCG tablet    diclofenac (VOLTAREN) 1 % topical gel    Emollient (CERAVE SA RENEWING) LOTN    Emollient (CERAVE) CREA    ferrous sulfate (FE TABS) 325 (65 Fe) MG EC tablet    ketoconazole (NIZORAL) 2 % external cream    ketoconazole (NIZORAL) 2 % external cream    ketoconazole (NIZORAL) 2 % external shampoo    lactobacillus rhamnosus, GG, (CULTURELL) capsule    metoprolol tartrate (LOPRESSOR) 25 MG tablet    mirtazapine (REMERON) 15 MG tablet    Multiple Vitamins-Minerals (MULTIVITAMIN ADULT PO)    risperiDONE (RISPERDAL) 0.5 MG tablet    sennosides (SENOKOT) 8.6 MG tablet    sertraline (ZOLOFT) 100 MG tablet     tamsulosin (FLOMAX) 0.4 MG capsule    urea (GORMEL) 20 % external cream    warfarin ANTICOAGULANT (JANTOVEN ANTICOAGULANT) 5 MG tablet     Current Facility-Administered Medications   Medication    hylan (SYNVISC ONE) injection 48 mg    hylan (SYNVISC ONE) injection 48 mg    lidocaine (PF) (XYLOCAINE) 1 % injection 3 mL    lidocaine (PF) (XYLOCAINE) 1 % injection 3 mL    lidocaine (PF) (XYLOCAINE) 1 % injection 4 mL    lidocaine (PF) (XYLOCAINE) 1 % injection 4 mL    triamcinolone (KENALOG-40) injection 40 mg    triamcinolone (KENALOG-40) injection 40 mg        Social Habits:    Tobacco Use      Smoking status: Former        Types: Cigarettes, Cigars        Quit date: 1968        Years since quittin.9      Smokeless tobacco: Never       Alcohol Use: Not on file       History   Drug Use No        Family History:    Family History   Problem Relation Age of Onset    C.A.D. Father     Anesthesia Reaction No family hx of     Crohn's Disease No family hx of     Ulcerative Colitis No family hx of     Cancer - colorectal No family hx of     Macular Degeneration No family hx of     Cancer No family hx of         No known family hx of skin cancer    Melanoma No family hx of     Skin Cancer No family hx of        Physical Examination:  BP (!) 152/68 (BP Location: Right arm, Patient Position: Sitting, Cuff Size: Adult Regular)   Pulse 62   SpO2 98%   Wt Readings from Last 1 Encounters:   22 69.5 kg (153 lb 3.2 oz)     There is no height or weight on file to calculate BMI.    Exam:  General: No apparent distress. Quite, minimally responds to questions.  Neurologic: Focally intact  Eyes: Grossly normal.   Cardiac:  RRR  Pulmonary:  Non labored breathing with normal respiratory effort  Abdominal: Soft, non distended, non tender to palpation.  No pulsatile mass appreciable on exam.   Musculoskeletal/Extremity: Moderate pitting edema.  Multiple non erythematous scabbed wounds and abrasions at areas of toe  deformity which appear related to pressure or friction.    Vascular Exam:     Radial: Left: 2+   Right: 2+    Popliteal: Left: non widened   Right: non widened    DP: Left: 2+   Right: 2+       Laboratory Findings:  Hemoglobin   Date Value Ref Range Status   09/26/2023 8.8 (L) 13.3 - 17.7 g/dL Final   04/03/2023 10.2 (L) 13.3 - 17.7 g/dL Final   05/12/2021 11.5 (L) 13.3 - 17.7 g/dL Final   02/15/2021 11.4 (L) 13.3 - 17.7 g/dL Final     WBC   Date Value Ref Range Status   05/12/2021 6.2 4.0 - 11.0 10e9/L Final   02/15/2021 7.2 4.0 - 11.0 10e9/L Final     WBC Count   Date Value Ref Range Status   09/26/2023 8.5 4.0 - 11.0 10e3/uL Final   04/03/2023 8.1 4.0 - 11.0 10e3/uL Final     Platelet Count   Date Value Ref Range Status   09/26/2023 191 150 - 450 10e3/uL Final   04/03/2023 170 150 - 450 10e3/uL Final   05/12/2021 148 (L) 150 - 450 10e9/L Final   02/15/2021 148 (L) 150 - 450 10e9/L Final     Creatinine   Date Value Ref Range Status   09/26/2023 1.59 (H) 0.67 - 1.17 mg/dL Final   05/12/2021 1.49 (H) 0.66 - 1.25 mg/dL Final     Sodium   Date Value Ref Range Status   09/26/2023 141 135 - 145 mmol/L Final     Comment:     Reference intervals for this test were updated on 09/26/2023 to more accurately reflect our healthy population. There may be differences in the flagging of prior results with similar values performed with this method. Interpretation of those prior results can be made in the context of the updated reference intervals.    05/12/2021 144 133 - 144 mmol/L Final     Glucose   Date Value Ref Range Status   09/26/2023 79 70 - 99 mg/dL Final   04/03/2023 101 (H) 70 - 99 mg/dL Final   04/27/2022 79 70 - 99 mg/dL Final     Comment:     This result was previously suppressed from the chart.   03/16/2022 132 (H) 70 - 99 mg/dL Final   05/12/2021 93 70 - 99 mg/dL Final   02/15/2021 98 70 - 99 mg/dL Final     Hemoglobin A1C   Date Value Ref Range Status   02/27/2012 5.7 4.3 - 6.0 % Final   01/27/2011 6.0 4.3 - 6.0 %  Final     INR   Date Value Ref Range Status   07/05/2021 2.31 (H) 0.86 - 1.14 Final     INR (External)   Date Value Ref Range Status   11/06/2023 1.6 (A) 0.9 - 1.1 Final       Imaging:  I personally reviewed the non invasive vascular labs, left lower extremity ultrasound and aortoiliac udplex from 10/31/2023 which shows noncompressible ABIs however multiphasic waveforms.  Aortoiliac ultrasound demonstrates 3.5 cm left common iliac artery aneurysm.  CT in 2021 showed approximately 3.1 to 3.2 cm in greatest diameter.  Unclear if this difference is related to the slow growth over 2 years or differences in measurement technique between ultrasound and CT.    Impression/Shared Medical Decision Making:    #1-no hemodynamically significant stenosis on ultrasound.  Mr. Florence is a pleasant 88 year old male evaluated for left common iliac aneurysm and left lower extremity wounds.  His wounds appear related to friction and pressure.  He has normal vascular labs with adequate perfusion for healing and no stenosis identified.  Additionally, when edema is compressed and moved out of the way, I believe he has normal palpable pedal pulses which confer nonsignificant peripheral vascular disease and great healing potential.  I do not think there is any role for further vascular imaging or intervention to improve healing potential and he should continue to follow for his virtually asymptomatic PAD.  In regards to his left common iliac aneurysm.  This may be reaching a size of 3.5 cm which is often considered a reasonable threshold for repair under the caveat that the anatomy is reasonable and the patient is of adequate physiologic risk for operation which Mr. Madrid certainly has not.  He is incredibly high risk for operation.  Based on his last CT his anatomy is reasonable for repair, however not straightforward given the size of his proximal common iliac and atheromatous disease.  I do think the size threshold for consideration repair  should be substantially higher and we will readdress this at 4 cm.  I do think we should have a CT angiogram of the abdomen pelvis to be able to compare to previous to understand whether this is growing or not.    Discussed warning signs including, but not limited to, symptomatic aneurysms, aortoiliac rupture, new abdominal or back pain, hypertension, syncope/lightheadedness, acute limb ischemia, vascular lower extremity pain, motor or sensory dysfunction, chronic limb threatening ischemia, ischemic rest pain, and worsening wounds.  If he experiences any of these, he has been advised to seek treatment and contact my team.    Mr. Florence and his wife are in agreement with this plan as outlined.    Recommendations/Plan:  No vascular surgical indication for repair of iliac aneurysm or PAD as it will not improve chances of wound healing and will predispose him to significant risk.  CTA abdomen/pelvis to better evaluate iliac aneurysm, anticipate setting threshold for SHAAN repair >4 cm if at all in his current state  Continue optimal medical therapy    45 minutes spent on the day of encounter doing chart review from our system and care everywhere, history and exam, documentation, coordinating care, and further activities as noted with over half spent counseling.       Again, thank you for allowing me to participate in the care of your patient.      Sincerely,    Wu Ellison

## 2023-11-18 NOTE — PROGRESS NOTES
Vascular & Endovascular Surgery Clinic Consultation   Vascular Surgeon: Wu Ellison MD, RPVI       Location:  [facility]    Noe Florence  Medical Record #:  [id number]  YOB: 1935  Age:  88 year old     Date of Service: 11/15/2023    Referring Provider: Rajiv Pollock.  Primary Care Provider: Roberto Sarmiento    Chief Complaint/Reason for Visit: Iliac aneurysm, Lower extremity wounds.    HPI:  Mr. Florence is a pleasant 88 year old male seen today with his wife Rica for an asymptomatic iliac aneurysm and lower extremity wounds.  The patient is minimally conversant  and thus far, information has been obtained with the help of his wife as an independent historian.  He has multiple caregivers at home. He barely walks and thus has no claudication, spending all day in the a chair.  He has some restless leg sensations that force him to move his legs at night and occasionally sit up at the side of the bed with his legs down at night but not ischemic pain that requires dangling.  He has wounds on his toes.      CV risk factors include DVT, CAD status post CABG and PCI, afib on warfarin, Vtach s/p ICD, Left common iliac aneurysm, CKD3, MGUS, polymyalgia rheumatica and is a remote smoker     Relevant surgical history:  - CAD s/p CABG in 1994 and again in 2006 and subsequent PCI in 2012    Review of Systems:    Not obtained as he does not answer this many questions.    Medical/Surgical History:    Past Medical History:   Diagnosis Date    Advanced open-angle glaucoma     Atrial fibrillation (H)     CKD (chronic kidney disease), stage III (H) 2005    Colon cancer (H)     Stage II-B colon cancer    Coronary artery disease     s/p CABG x 2, JEREMY x 2    Diverticulitis     Hyperlipidemia     Hypertension     Ischemic cardiomyopathy     MGUS (monoclonal gammopathy of unknown significance)     Nonsenile cataract     BE    Osteoporosis     left hip fracture     Polymorphic ventricular tachycardia (H)     Polymyalgia rheumatica (H24)     PVD (posterior vitreous detachment), both eyes 2005    S/P ablation of atrial flutter 6/20/14    CTI    Stented coronary artery     SVT (supraventricular tachycardia)     PPM/AICD for NSVT    Upper leg DVT (deep venous thromboembolism), chronic (H)     Left    Weight loss, unintentional 2017    15 lb in 4 months         Past Surgical History:   Procedure Laterality Date    CATARACT IOL, RT/LT Bilateral     COLONOSCOPY  3/13/2014    Procedure: COMBINED COLONOSCOPY, SINGLE BIOPSY/POLYPECTOMY BY BIOPSY;;  Surgeon: Mary Gerber MD;  Location:  GI    COLONOSCOPY N/A 6/22/2015    Procedure: COLONOSCOPY;  Surgeon: Marilin Newman MD;  Location:  GI    COLONOSCOPY N/A 11/7/2018    Procedure: COMBINED COLONOSCOPY, SINGLE OR MULTIPLE BIOPSY/POLYPECTOMY BY BIOPSY;  Surgeon: Roberto Esteban MD;  Location:  GI    EP ICD GENERATOR REPLACEMENT DUAL N/A 12/11/2018    Procedure: EP ICD Generator Change Dual;  Surgeon: Deedee Baird MD;  Location:  HEART CARDIAC CATH LAB    H ABLATION ATRIAL FLUTTER      HEART CATH DRUG ELUTING STENT PLACEMENT  4/19/2012    JEREMY x 2 to LCx    IMPLANT IMPLANTABLE CARDIOVERTER DEFIBRILLATOR  5-    ppm/aicd    KNEE SURGERY      right and left knee surgeries    LAPAROSCOPIC ASSISTED COLECTOMY Right 2/1/2019    Procedure: Laparoscopic Converted to Open Transverse Colectomy with Lysis of Adhesions;  Surgeon: Chanelle Guzmná MD;  Location:  OR    LAPAROSCOPIC ASSISTED COLECTOMY LEFT (DESCENDING)  4/8/2014    Procedure: LAPAROSCOPIC ASSISTED COLECTOMY LEFT (DESCENDING);  Hand assisted Laparoscopic Sigmoid Colectomy , Laparoscopic mobilization of spleenic flexure *Latex Allergy*Anesthesia General with Block;  Surgeon: Chanelle Guzmán MD;  Location: [facility] OR    OPEN REDUCTION INTERNAL FIXATION RODDING INTRAMEDULLAR FEMUR FRACTURE TABLE Left 3/14/2019    Procedure: Open  Reduction Internal Fixation Left Femur Intramedullar Nailing;  Surgeon: Srikanth Avalos MD;  Location: [facility] OR    REPAIR VALVE MITRAL  2006     30-mm Medtronic Julian ring     SELECTIVE LASER TRABECULOPLASTY (SLT) OD (RIGHT EYE)  4/10, 1/12,+1/9/13    RE    SELECTIVE LASER TRABECULOPLASTY (SLT) OS (LEFT EYE)  5/2012    SHOULDER SURGERY      right rotator cuff    ZC CABG, ARTERY-VEIN, FOUR  2006    LIMA-LAD, SVG-Rt PDA, SVG-OM2, SVG-Diag 1    [facility] CABG, VEIN, SINGLE  1994    1-vessel CABG, SVG->PDRCA         Allergies:     Allergies   Allergen Reactions    Latex Rash     Rash        Medications:    Current Outpatient Medications   Medication    acetaminophen (TYLENOL) 500 MG tablet    alendronate (FOSAMAX) 70 MG tablet    bacitracin 500 UNIT/GM ophthalmic ointment    baclofen (LIORESAL) 10 MG tablet    calcium carbonate 500 mg, elemental, (OSCAL;OYSTER SHELL CALCIUM) 500 MG tablet    cephALEXin (KEFLEX) 500 MG capsule    cholecalciferol 1000 units TABS    COMPRESSION STOCKINGS    cyanocobalamin (VITAMIN B-12) 1000 MCG tablet    diclofenac (VOLTAREN) 1 % topical gel    Emollient (CERAVE SA RENEWING) LOTN    Emollient (CERAVE) CREA    ferrous sulfate (FE TABS) 325 (65 Fe) MG EC tablet    ketoconazole (NIZORAL) 2 % external cream    ketoconazole (NIZORAL) 2 % external cream    ketoconazole (NIZORAL) 2 % external shampoo    lactobacillus rhamnosus, GG, (CULTURELL) capsule    metoprolol tartrate (LOPRESSOR) 25 MG tablet    mirtazapine (REMERON) 15 MG tablet    Multiple Vitamins-Minerals (MULTIVITAMIN ADULT PO)    risperiDONE (RISPERDAL) 0.5 MG tablet    sennosides (SENOKOT) 8.6 MG tablet    sertraline (ZOLOFT) 100 MG tablet    tamsulosin (FLOMAX) 0.4 MG capsule    urea (GORMEL) 20 % external cream    warfarin ANTICOAGULANT (JANTOVEN ANTICOAGULANT) 5 MG tablet     Current Facility-Administered Medications   Medication    hylan (SYNVISC ONE) injection 48 mg    hylan (SYNVISC ONE) injection 48 mg    lidocaine (PF)  (XYLOCAINE) 1 % injection 3 mL    lidocaine (PF) (XYLOCAINE) 1 % injection 3 mL    lidocaine (PF) (XYLOCAINE) 1 % injection 4 mL    lidocaine (PF) (XYLOCAINE) 1 % injection 4 mL    triamcinolone (KENALOG-40) injection 40 mg    triamcinolone (KENALOG-40) injection 40 mg        Social Habits:    Tobacco Use      Smoking status: Former        Types: Cigarettes, Cigars        Quit date: 1968        Years since quittin.9      Smokeless tobacco: Never       Alcohol Use: Not on file       History   Drug Use No        Family History:    Family History   Problem Relation Age of Onset    C.A.D. Father     Anesthesia Reaction No family hx of     Crohn's Disease No family hx of     Ulcerative Colitis No family hx of     Cancer - colorectal No family hx of     Macular Degeneration No family hx of     Cancer No family hx of         No known family hx of skin cancer    Melanoma No family hx of     Skin Cancer No family hx of        Physical Examination:  BP (!) 152/68 (BP Location: Right arm, Patient Position: Sitting, Cuff Size: Adult Regular)   Pulse 62   SpO2 98%   Wt Readings from Last 1 Encounters:   22 69.5 kg (153 lb 3.2 oz)     There is no height or weight on file to calculate BMI.    Exam:  General: No apparent distress. Quite, minimally responds to questions.  Neurologic: Focally intact  Eyes: Grossly normal.   Cardiac:  RRR  Pulmonary:  Non labored breathing with normal respiratory effort  Abdominal: Soft, non distended, non tender to palpation.  No pulsatile mass appreciable on exam.   Musculoskeletal/Extremity: Moderate pitting edema.  Multiple non erythematous scabbed wounds and abrasions at areas of toe deformity which appear related to pressure or friction.    Vascular Exam:     Radial: Left: 2+   Right: 2+    Popliteal: Left: non widened   Right: non widened    DP: Left: 2+   Right: 2+       Laboratory Findings:  Hemoglobin   Date Value Ref Range Status   2023 8.8 (L) 13.3 - 17.7 g/dL Final    04/03/2023 10.2 (L) 13.3 - 17.7 g/dL Final   05/12/2021 11.5 (L) 13.3 - 17.7 g/dL Final   02/15/2021 11.4 (L) 13.3 - 17.7 g/dL Final     WBC   Date Value Ref Range Status   05/12/2021 6.2 4.0 - 11.0 10e9/L Final   02/15/2021 7.2 4.0 - 11.0 10e9/L Final     WBC Count   Date Value Ref Range Status   09/26/2023 8.5 4.0 - 11.0 10e3/uL Final   04/03/2023 8.1 4.0 - 11.0 10e3/uL Final     Platelet Count   Date Value Ref Range Status   09/26/2023 191 150 - 450 10e3/uL Final   04/03/2023 170 150 - 450 10e3/uL Final   05/12/2021 148 (L) 150 - 450 10e9/L Final   02/15/2021 148 (L) 150 - 450 10e9/L Final     Creatinine   Date Value Ref Range Status   09/26/2023 1.59 (H) 0.67 - 1.17 mg/dL Final   05/12/2021 1.49 (H) 0.66 - 1.25 mg/dL Final     Sodium   Date Value Ref Range Status   09/26/2023 141 135 - 145 mmol/L Final     Comment:     Reference intervals for this test were updated on 09/26/2023 to more accurately reflect our healthy population. There may be differences in the flagging of prior results with similar values performed with this method. Interpretation of those prior results can be made in the context of the updated reference intervals.    05/12/2021 144 133 - 144 mmol/L Final     Glucose   Date Value Ref Range Status   09/26/2023 79 70 - 99 mg/dL Final   04/03/2023 101 (H) 70 - 99 mg/dL Final   04/27/2022 79 70 - 99 mg/dL Final     Comment:     This result was previously suppressed from the chart.   03/16/2022 132 (H) 70 - 99 mg/dL Final   05/12/2021 93 70 - 99 mg/dL Final   02/15/2021 98 70 - 99 mg/dL Final     Hemoglobin A1C   Date Value Ref Range Status   02/27/2012 5.7 4.3 - 6.0 % Final   01/27/2011 6.0 4.3 - 6.0 % Final     INR   Date Value Ref Range Status   07/05/2021 2.31 (H) 0.86 - 1.14 Final     INR (External)   Date Value Ref Range Status   11/06/2023 1.6 (A) 0.9 - 1.1 Final       Imaging:  I personally reviewed the non invasive vascular labs, left lower extremity ultrasound and aortoiliac udplex from  10/31/2023 which shows noncompressible ABIs however multiphasic waveforms.  Aortoiliac ultrasound demonstrates 3.5 cm left common iliac artery aneurysm.  CT in 2021 showed approximately 3.1 to 3.2 cm in greatest diameter.  Unclear if this difference is related to the slow growth over 2 years or differences in measurement technique between ultrasound and CT.    Impression/Shared Medical Decision Making:    #1-no hemodynamically significant stenosis on ultrasound.  Mr. Florence is a pleasant 88 year old male evaluated for left common iliac aneurysm and left lower extremity wounds.  His wounds appear related to friction and pressure.  He has normal vascular labs with adequate perfusion for healing and no stenosis identified.  Additionally, when edema is compressed and moved out of the way, I believe he has normal palpable pedal pulses which confer nonsignificant peripheral vascular disease and great healing potential.  I do not think there is any role for further vascular imaging or intervention to improve healing potential and he should continue to follow for his virtually asymptomatic PAD.  In regards to his left common iliac aneurysm.  This may be reaching a size of 3.5 cm which is often considered a reasonable threshold for repair under the caveat that the anatomy is reasonable and the patient is of adequate physiologic risk for operation which Mr. Madrid certainly has not.  He is incredibly high risk for operation.  Based on his last CT his anatomy is reasonable for repair, however not straightforward given the size of his proximal common iliac and atheromatous disease.  I do think the size threshold for consideration repair should be substantially higher and we will readdress this at 4 cm.  I do think we should have a CT angiogram of the abdomen pelvis to be able to compare to previous to understand whether this is growing or not.    Discussed warning signs including, but not limited to, symptomatic aneurysms,  aortoiliac rupture, new abdominal or back pain, hypertension, syncope/lightheadedness, acute limb ischemia, vascular lower extremity pain, motor or sensory dysfunction, chronic limb threatening ischemia, ischemic rest pain, and worsening wounds.  If he experiences any of these, he has been advised to seek treatment and contact my team.    Mr. Florence and his wife are in agreement with this plan as outlined.    Recommendations/Plan:  No vascular surgical indication for repair of iliac aneurysm or PAD as it will not improve chances of wound healing and will predispose him to significant risk.  CTA abdomen/pelvis to better evaluate iliac aneurysm, anticipate setting threshold for SHAAN repair >4 cm if at all in his current state  Continue optimal medical therapy    Wu Ellison MD, [facility]  Vascular & Endovascular Surgery      45 minutes spent on the day of encounter doing chart review from our system and care everywhere, history and exam, documentation, coordinating care, and further activities as noted with over half spent counseling.

## 2023-11-21 NOTE — TELEPHONE ENCOUNTER
Called pt to provide details for pre-CT hydration appointment. LVM w/ detailed appt info and clinic callback. Will also send MyC message.    AMENA Carbajal, RN  RN Care Coordinator  [facility] Vascular Surgery - [facility] phone: [phone]  Fax: [phone]

## 2023-11-27 NOTE — PROGRESS NOTES
ANTICOAGULATION MANAGEMENT     Noe Florence 88 year old male is on warfarin with therapeutic INR result. (Goal INR 1.5 - 2.5).    Recent labs: (last 7 days)     11/27/23  0000   INR 1.6*       ASSESSMENT     Source(s): Chart Review and Patient/Caregiver Call     Warfarin doses taken: Warfarin taken as instructed  Diet: No new diet changes identified  Medication/supplement changes: None noted  New illness, injury, or hospitalization: No  Signs or symptoms of bleeding or clotting: No  Previous result: Therapeutic last visit; previously outside of goal range  Additional findings: None       PLAN     Recommended plan for no diet, medication or health factor changes affecting INR     Dosing Instructions: Continue your current warfarin dose with next INR in 3 weeks       Summary  As of 11/27/2023      Full warfarin instructions:  5 mg every Sun, Wed, Fri; 2.5 mg all other days   Next INR check:  12/18/2023               Telephone call with Rica who agrees to plan and repeated back plan correctly    Orders given to [facility] ([phone])--Message left to schedule Bill for an INR on 12/18/23.    Education provided:   Contact [phone] with any changes, questions or concerns.     Plan made per [facility] anticoagulation protocol    Danya Edwards RN  Anticoagulation Clinic  11/27/2023    _______________________________________________________________________     Anticoagulation Episode Summary       Current INR goal:  Other - see comment   TTR:  15.3% (1 y)   Target end date:  Indefinite   Send INR reminders to:  [facility]    Indications    Atrial fibrillation (H) [I48.91] [I48.91]  Long-term (current) use of anticoagulants [Z79.01] [Z79.01]  Deep vein thrombosis (DVT) (H) [I82.409]  Atrial fibrillation  unspecified type (H) [I48.91]  Chronic atrial fibrillation (H) [I48.20]             Comments:  Goal range: 1.5-2.5             Anticoagulation Care Providers       Provider Role Specialty Phone  number    Roberto Sarmiento MD [facility] Internal Medicine [phone]

## 2023-12-06 NOTE — PROGRESS NOTES
Infusion Nursing Note:  Noe Florence presents today for IVF.    Patient seen by provider today: No   present during visit today: Not Applicable.    Note:   1L LR over 1 hour    Intravenous Access:  Peripheral [facility] by [facility].    Treatment Conditions:  Not Applicable.    Post Infusion Assessment:  Patient tolerated infusion without incident.  Blood return noted pre and post infusion.  Site patent and intact, free from redness, edema or discomfort.  No evidence of extravasations.  Patient going to CT straight from [facility] appointment, IV left in place.     Discharge Plan:   Patient and/or family verbalized understanding of discharge instructions and all questions answered.  [facility]  Patient discharged in stable condition accompanied by: wife and caregiver. Patient discharged to CT.  Departure Mode: Wheelchair.    Administrations This Visit       lactated ringers BOLUS 1,000 mL       Admin Date  12/06/2023 Action  $New Bag Dose  1,000 mL Rate  999 mL/hr Route  Intravenous Documented By  Ayesha Perez RN                BP (!) 143/79 (BP Location: Right arm)   Pulse 65   Temp 97.8  F (36.6  C) (Oral)   Resp 18   SpO2 98%     Ayesha Perez, BYRON

## 2023-12-06 NOTE — DISCHARGE INSTRUCTIONS

## 2023-12-06 NOTE — PATIENT INSTRUCTIONS
Dear Noe Florence    Thank you for choosing [facility] ([facility]) for your infusion.  The following information is a summary of our appointment as well as important reminders.      We look forward in seeing you on your next appointment here at [facility] ([facility]).  Please don t hesitate to call us at [phone] to reschedule any of your appointments or to speak with one of the [facility] registered nurses.  It was a pleasure taking care of you today.    Sincerely,    [facility]  [facility]  [address] [place] [address]  [address]  Phone:  [phone]

## 2023-12-08 PROBLEM — L03.116 CELLULITIS OF LEFT LOWER EXTREMITY: Status: ACTIVE | Noted: 2023-01-01

## 2023-12-08 PROBLEM — M86.172 OTHER ACUTE OSTEOMYELITIS OF LEFT FOOT (H): Status: ACTIVE | Noted: 2023-01-01

## 2023-12-08 NOTE — H&P
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 Admitted with R 2nd toe wound and c/f osteomyelitis.     Infected L 2nd toe wound with surrounding cellulitis, and concern for osteomyelitis   Hx of neuropathy 2/2 MGUS   Long history of lower extremity wounds. Evaluated 11/15 in Vascular clinic, CTA imaging w/o stenosis, and ABIs adequate thus not felt to have significant PAD causing wounds. 1mo history of poor healing wound R second toe, with extension into webspace between 1st and 2nd toes. No DM history, though A1c checked years ago. Following in podiatry clinic, 2 week history of progression and surrounding cellulitis. XR in ED w/ ? Of osteomyelitis. Based on work up thus far suspect wound is secondary to neuropathy and pressure ulcer. Requires further IV abx, and imaging to rule out underlying osteomyelitis.   - Cont IV Vancomycin   - Follow blood cultures   - Obtain MRSA swab   - Podiatry consult  - Attempted to obtain MRI, unable to get tonight because needs ICD clearance first (though per review suspect ICD should be compatible). Per d/w MRI desk may not be able to get until early next week. Will obtain CT scan of L foot tonight  - Wound care consult   - For pain: PTA tylenol 1000mg BID   - Check A1C     Atrial fibrillation on AC   ventricular tachycardia s/p ICD device placement  Follows with [facility] at [facility]. S/p ICD placement in 2018. He is on warfarin and follows for anticoagulation therapy at the  as well. INR 1.88 on admit, subtherapeutic.   -Continue PTA metoprolol 12.5mg  BID   -Continue PTA warfarin, pharm to dose     CKD Stage III   Baseline creat 1.5-1.8. Creat on admit 1.67.   - Follow BMP  - On Vanc, avoid other nephrotoxins as able     Anxiety  Depression  Profound anxiety per wife that had recently improved w/ increasing risperidone.   - PTA risperidone 0.5mg qAM and 1mg qPM   - PTA mirtazapine  - Sertraline    Acute on chronic anemia   - Check iron studies, B12, Folate     Benign prostatic hyperplasia  - PTA tamsulosin    Osteopenia   - PTA Fosamax  - PTA Vit D3 + calcium carbonate     Diet: Regular Diet Adult    DVT Prophylaxis: Warfarin, pneumatic compression device.  Dominguez Catheter: Not present  Fluids: 1L Lactated Ringers bolus  Lines: None     Cardiac Monitoring: None  Code Status:  full code.         Disposition Plan      Expected Discharge Date: 12/10/2023                The patient's care was discussed with the attending Dr. Kenyetta Saleh.    Liliana Britton  Medical Student  [address]  Securely message with Vocera (more info)  Text page via [facility] Paging/Directory   See signed in provider for up to date coverage information      Resident/Fellow Attestation   I, Ximena Blankenship MD, was present with the medical/INGRIS student who participated in the service and in the documentation of the note.  I have verified the history and personally performed the physical exam and medical decision making.  I agree with the assessment and plan of care as documented in the note.    Ximena Blankenship MD  PGY3  Date of Service (when I saw the patient): 12/08/23  ______________________________________________________________________    Chief Complaint   Sha Florence is an 88 year old male who presents with 1 month of worsening left lower extremity gangrene concerning for osteomyelitis.    History of Present Illness   Noe Florence is a 88 year old male with pertinent medical history of chronic neuropathy due to MGUS, polymyalgia  rheumatica, chronic kidney disease stage III, CAD, atrial fibrillation and ventricular tachycardia s/p ICD device placement .     Two month history of progressive wounds on feet. Evaluated by vascular surgery and work up for PAD as cause of the wounds was negative. R 2nd toe wound has increased over the past 1-2 weeks and spread to inter-digit space between 1st/2nd toes. Over the past several days it has been having more purulent and bloody drainage. He is followed by Podiatry, and have been attempting an outpatient course of amoxicillin but wound has increased despite this. Wife reports they were thinking about coming in for the past 1-2 days but were hoping it would get better.      Patient lives at home in a house with his wife. They have homecare for medications, and he gets assistance for toileting. Over the past 1-2 months has been less mobile than previously, cannot say whether it is due to pain in feet/toe or if just generalized weakness. Recently denies any fevers, chills, chest pain, shortness of breath, nausea, vomiting, diarrhea, chest pain, shortness of breath. Has some trace lower extremity edema that is stable. Good appetite. No pain currently, and no other complaints.    Past Medical History    Past Medical History:   Diagnosis Date    Advanced open-angle glaucoma     Atrial fibrillation (H)     CKD (chronic kidney disease), stage III (H) 2005    Colon cancer (H)     Stage II-B colon cancer    Coronary artery disease     s/p CABG x 2, JEREMY x 2    Diverticulitis     Hyperlipidemia     Hypertension     Ischemic cardiomyopathy     MGUS (monoclonal gammopathy of unknown significance)     Nonsenile cataract     BE    Osteoporosis     left hip fracture    Polymorphic ventricular tachycardia (H)     Polymyalgia rheumatica (H24)     PVD (posterior vitreous detachment), both eyes 2005    S/P ablation of atrial flutter 6/20/14    CTI    Stented coronary artery     SVT (supraventricular tachycardia)     PPM/AICD  for NSVT    Upper leg DVT (deep venous thromboembolism), chronic (H)     Left    Weight loss, unintentional 2017    15 lb in 4 months       Past Surgical History   Past Surgical History:   Procedure Laterality Date    CATARACT IOL, RT/LT Bilateral     COLONOSCOPY  3/13/2014    Procedure: COMBINED COLONOSCOPY, SINGLE BIOPSY/POLYPECTOMY BY BIOPSY;;  Surgeon: Mary Gerber MD;  Location:  GI    COLONOSCOPY N/A 6/22/2015    Procedure: COLONOSCOPY;  Surgeon: Marilin Newman MD;  Location: [facility]    COLONOSCOPY N/A 11/7/2018    Procedure: COMBINED COLONOSCOPY, SINGLE OR MULTIPLE BIOPSY/POLYPECTOMY BY BIOPSY;  Surgeon: Roberto Esteban MD;  Location:  GI    EP ICD GENERATOR REPLACEMENT DUAL N/A 12/11/2018    Procedure: EP ICD Generator Change Dual;  Surgeon: Deedee Baird MD;  Location:  HEART CARDIAC CATH LAB    H ABLATION ATRIAL FLUTTER      HEART CATH DRUG ELUTING STENT PLACEMENT  4/19/2012    JEREMY x 2 to LCx    IMPLANT IMPLANTABLE CARDIOVERTER DEFIBRILLATOR  5-    ppm/aicd    KNEE SURGERY      right and left knee surgeries    LAPAROSCOPIC ASSISTED COLECTOMY Right 2/1/2019    Procedure: Laparoscopic Converted to Open Transverse Colectomy with Lysis of Adhesions;  Surgeon: Chanelle Guzmán MD;  Location:  OR    LAPAROSCOPIC ASSISTED COLECTOMY LEFT (DESCENDING)  4/8/2014    Procedure: LAPAROSCOPIC ASSISTED COLECTOMY LEFT (DESCENDING);  Hand assisted Laparoscopic Sigmoid Colectomy , Laparoscopic mobilization of spleenic flexure *Latex Allergy*Anesthesia General with Block;  Surgeon: Chanelle Guzmán MD;  Location:  OR    OPEN REDUCTION INTERNAL FIXATION RODDING INTRAMEDULLAR FEMUR FRACTURE TABLE Left 3/14/2019    Procedure: Open Reduction Internal Fixation Left Femur Intramedullar Nailing;  Surgeon: Srikanth Avalos MD;  Location:  OR    REPAIR VALVE MITRAL  2006     30-mm Medtronic Julian ring     SELECTIVE LASER TRABECULOPLASTY (SLT) OD (RIGHT EYE)   4/10, ,+13    RE    SELECTIVE LASER TRABECULOPLASTY (SLT) OS (LEFT EYE)  2012    SHOULDER SURGERY      right rotator cuff    RUST CABG, ARTERY-VEIN, FOUR      LIMA-LAD, SVG-Rt PDA, SVG-OM2, SVG-Diag 1    RUST CABG, VEIN, SINGLE      1-vessel CABG, SVG->PDRCA        Prior to Admission Medications   Prior to Admission Medications   Prescriptions Last Dose Informant Patient Reported? Taking?   COMPRESSION STOCKINGS   No No   Si each daily   Emollient (CERAVE SA RENEWING) LOTN   Yes No   Si application, topical, Once A Day, Apply to torso   Emollient (CERAVE) CREA   Yes No   Si application, topical, Once A Day, Apply to bilateral legs   Multiple Vitamins-Minerals (MULTIVITAMIN ADULT PO)   Yes No   acetaminophen (TYLENOL) 500 MG tablet   Yes No   Sig: Take 1,000 mg by mouth 2 times daily And every day PRN   alendronate (FOSAMAX) 70 MG tablet   No No   Sig: Take 1 tablet (70 mg) by mouth every 7 days Take with a full glass of water and do not eat or lay down for 30 minutes   bacitracin 500 UNIT/GM ophthalmic ointment   Yes No   Si application, topical, Once A Day, Apply to open areas on right great to and right second toe and cover with dressing daily (has pressure area to toes)   baclofen (LIORESAL) 10 MG tablet   No No   Sig: Take 1 tablet (10 mg) by mouth 3 times daily   calcium carbonate 500 mg, elemental, (OSCAL;OYSTER SHELL CALCIUM) 500 MG tablet   No No   Sig: Take 1 tablet (500 mg) by mouth 2 times daily   cephALEXin (KEFLEX) 500 MG capsule   No No   Sig: Take 1 capsule (500 mg) by mouth 2 times daily   cholecalciferol 1000 units TABS   Yes No   Sig: Take 1,000 Units by mouth 2 times daily   cyanocobalamin (VITAMIN B-12) 1000 MCG tablet   No No   Sig: Take 4 daily   diclofenac (VOLTAREN) 1 % topical gel   No No   Sig: Apply 2 g topically 4 times daily   ferrous sulfate (FE TABS) 325 (65 Fe) MG EC tablet   Yes No   Sig: Take 325 mg by mouth daily   ketoconazole (NIZORAL) 2 % external  cream   No No   Sig: Apply to the feet 2 times a day until rash clears then 3-4 times a week for maintenance   ketoconazole (NIZORAL) 2 % external cream   No No   Sig: Apply topically 2 times daily   ketoconazole (NIZORAL) 2 % external shampoo   No No   Sig: Apply to scalp daily as needed until flaking resolves   lactobacillus rhamnosus, GG, (CULTURELL) capsule   Yes No   Sig: Take 1 capsule by mouth 2 times daily   metoprolol tartrate (LOPRESSOR) 25 MG tablet   No No   Sig: Take 1 tablet (25 mg) by mouth 2 times daily   mirtazapine (REMERON) 15 MG tablet  Self Yes No   Sig: Take 15 mg by mouth At Bedtime.   risperiDONE (RISPERDAL) 0.5 MG tablet   Yes No   Sig: Take 2 tablets by mouth daily   sennosides (SENOKOT) 8.6 MG tablet   Yes No   Sig: Take 1 tablet by mouth 2 times daily   sertraline (ZOLOFT) 100 MG tablet  Self Yes No   Sig: Take 200 mg by mouth every evening   tamsulosin (FLOMAX) 0.4 MG capsule   No No   Sig: Take 2 capsules (0.8 mg) by mouth daily   urea (GORMEL) 20 % external cream   Yes No   Sig: Apply topically 2 times daily Apply to bilateral heels BID   warfarin ANTICOAGULANT (JANTOVEN ANTICOAGULANT) 5 MG tablet   No No   Sig: TAKE ONE TABLET ( 5 MG ) SUNDAY, WEDNESDAY, & FRIDAY AND ONE-HALF TABLET ( 2.5 MG ) ALL OTHER DAYS  OR AS DIRECTED BY ACC TEAM      Facility-Administered Medications Last Administration Doses Remaining   hylan (SYNVISC ONE) injection 48 mg 5/30/2023  1:17 PM    hylan (SYNVISC ONE) injection 48 mg 5/30/2023  1:17 PM    lidocaine (PF) (XYLOCAINE) 1 % injection 3 mL 5/30/2023  1:17 PM    lidocaine (PF) (XYLOCAINE) 1 % injection 3 mL 5/30/2023  1:17 PM    lidocaine (PF) (XYLOCAINE) 1 % injection 4 mL 4/21/2023  2:17 PM    lidocaine (PF) (XYLOCAINE) 1 % injection 4 mL 4/21/2023  2:17 PM    triamcinolone (KENALOG-40) injection 40 mg 4/21/2023  2:17 PM    triamcinolone (KENALOG-40) injection 40 mg 4/21/2023  2:17 PM            Review of Systems    The 10 point Review of Systems is  negative other than noted in the HPI or here.      Physical Exam   Vital Signs: Temp: 97.4  F (36.3  C) Temp src: Oral BP: 132/65 Pulse: 60   Resp: 18 SpO2: 100 % O2 Device: None (Room air)    Weight: 0 lbs 0 oz    Constitutional: In bed and pleasant, not able to speak long sentences, wife speaks for him and answers questions on bedside however he is able to answer questions.  Respiratory: bilaterally clear to auscultation, no crackles or wheezes noted.  Cardiovascular: no murmurs or extra heart sounds noted.  Skin: R second toe with overlying ulcer, crusting, mild purulence, mild surrounding cellulitis extending to midfoot   Neuropsychiatric: Oriented to person, place, and situation. Low focus.      Data     I have personally reviewed the following data over the past 24 hrs:    6.7  \   8.3 (L)   / 159     141 113 (H) 40.9 (H) /  165 (H)   4.4 20 (L) 1.56 (H) \     ALT: 9 AST: 15 AP: 73 TBILI: 0.2   ALB: 3.5 TOT PROTEIN: 6.5 LIPASE: N/A     TSH: N/A T4: N/A A1C: 6.0 (H)     Procal: N/A CRP: 8.74 (H) Lactic Acid: 1.7       INR:  1.88 (H) PTT:  33   D-dimer:  N/A Fibrinogen:  N/A

## 2023-12-08 NOTE — PHARMACY-ADMISSION MEDICATION HISTORY
"Pharmacist Admission Medication History    Admission medication history is complete. The information provided in this note is only as accurate as the sources available at the time of the update.    Information Source(s): Family member via phone interview - patient's wife Rica referred to a home medication list. Also used dispense report.     Pertinent Information:   - Patient had been taking Augmentin (generic) 500/125 mg 1 tablet BID since 11/30/23.  - Rica confirmed the patient's dose of warfarin as 5 mg on Sunday, Wednesday and Friday and 2.5 mg all other days. She reports giving him 2.5 mg this morning because they forgot it last night - she had planned to give his usual 5 mg tonight.   - Rica states that Bill has not been taking baclofen, bacitracin ointment, cephalexin, or ketoconazole shampoo for several months.  - Rica states that another caregiver comes to do wound cares, so she is unsure if/when all of his topical medications have been applied.     Changes made to PTA medication list:  Added: Augmentin, per Rica and dispense report.  Deleted: None  Changed:   Metoprolol from 25 mg BID to 12.5 mg BID. Rica states that the doctor told them to switch to 1/2 tab BID \"a long time ago.\"   Risperidone from 2 tabs per day to 0.5 mg QAM and 1 mg QPM (AM dose added recently and has been helpful)  Cyanocobalamin from 4 tabs daily to 2000 mcg BID           Allergies reviewed with patient and updates made in EHR: yes    Medication History Completed By:   Gavi Canas, PharmD      Current Facility-Administered Medications for the 12/8/23 encounter (Hospital Encounter)   Medication    hylan (SYNVISC ONE) injection 48 mg    hylan (SYNVISC ONE) injection 48 mg    lidocaine (PF) (XYLOCAINE) 1 % injection 3 mL    lidocaine (PF) (XYLOCAINE) 1 % injection 3 mL    lidocaine (PF) (XYLOCAINE) 1 % injection 4 mL    lidocaine (PF) (XYLOCAINE) 1 % injection 4 mL    triamcinolone (KENALOG-40) injection 40 mg    " triamcinolone (KENALOG-40) injection 40 mg     PTA Med List   Medication Sig Last Dose    acetaminophen (TYLENOL) 500 MG tablet Take 1,000 mg by mouth 2 times daily And every day PRN 12/8/2023 at AM    amoxicillin-clavulanate (AUGMENTIN) 500-125 MG tablet Take 1 tablet by mouth 2 times daily Started 11/30/23, filled for 14 day supply 12/8/2023 at AM    bacitracin 500 UNIT/GM ophthalmic ointment 1 application, topical, Once A Day, Apply to open areas on right great to and right second toe and cover with dressing daily (has pressure area to toes) More than a month at unknown    baclofen (LIORESAL) 10 MG tablet Take 1 tablet (10 mg) by mouth 3 times daily More than a month at unknown    calcium carbonate 500 mg, elemental, (OSCAL;OYSTER SHELL CALCIUM) 500 MG tablet Take 1 tablet (500 mg) by mouth 2 times daily 12/8/2023 at AM    cephALEXin (KEFLEX) 500 MG capsule Take 1 capsule (500 mg) by mouth 2 times daily Past Month at unknown    cholecalciferol 1000 units TABS Take 1,000 Units by mouth 2 times daily 12/8/2023 at AM    cyanocobalamin (VITAMIN B-12) 1000 MCG tablet Take 4 daily (Patient taking differently: Take 2,000 mcg by mouth 2 times daily Take 4 daily) 12/8/2023 at AM    diclofenac (VOLTAREN) 1 % topical gel Apply 2 g topically 4 times daily (Patient taking differently: Apply 2 g topically 4 times daily as needed) 12/8/2023 at AM    Emollient (CERAVE SA RENEWING) LOTN 1 application, topical, Once A Day, Apply to torso 12/8/2023 at AM    Emollient (CERAVE) CREA 1 application, topical, Once A Day, Apply to bilateral legs 12/8/2023 at AM    ferrous sulfate (FE TABS) 325 (65 Fe) MG EC tablet Take 325 mg by mouth daily 12/8/2023 at AM    ketoconazole (NIZORAL) 2 % external cream Apply to the feet 2 times a day until rash clears then 3-4 times a week for maintenance Unknown at unknown    ketoconazole (NIZORAL) 2 % external shampoo Apply to scalp daily as needed until flaking resolves More than a month at unknown     lactobacillus rhamnosus, GG, (CULTURELL) capsule Take 1 capsule by mouth 2 times daily 12/8/2023 at AM    metoprolol tartrate (LOPRESSOR) 25 MG tablet Take 1 tablet (25 mg) by mouth 2 times daily (Patient taking differently: Take 12.5 mg by mouth 2 times daily) 12/8/2023 at AM    mirtazapine (REMERON) 15 MG tablet Take 15 mg by mouth At Bedtime. 12/7/2023 at PM    Multiple Vitamins-Minerals (MULTIVITAMIN ADULT PO) Take 1 tablet by mouth every morning 12/8/2023 at AM    risperiDONE (RISPERDAL) 0.5 MG tablet Take by mouth daily 0.5 mg QAM, 1 mg QHS 12/8/2023 at AM    sennosides (SENOKOT) 8.6 MG tablet Take 1 tablet by mouth 2 times daily 12/8/2023 at AM    sertraline (ZOLOFT) 100 MG tablet Take 200 mg by mouth every evening 12/7/2023 at PM    tamsulosin (FLOMAX) 0.4 MG capsule Take 2 capsules (0.8 mg) by mouth daily 12/7/2023 at PM    warfarin ANTICOAGULANT (JANTOVEN ANTICOAGULANT) 5 MG tablet TAKE ONE TABLET ( 5 MG ) SUNDAY, WEDNESDAY, & FRIDAY AND ONE-HALF TABLET ( 2.5 MG ) ALL OTHER DAYS  OR AS DIRECTED BY ACC TEAM 12/8/2023 at AM 2.5 mg

## 2023-12-08 NOTE — ED TRIAGE NOTES
Pt presents with his wife and caretaker. Sha has been having feet pain for the past month. Over the past week they have been trying to get him seen by a doctor for the situation- she presents with pictures. L toe picture provided by caretaker. [place]    They went to a a podiatrist who said it was vascular. The vascular surgeon and they said it was not in his bina. Podiatry put him on an antibiotic and said if it does not improve go tot he ER for IV antibiotics.

## 2023-12-08 NOTE — ED PROVIDER NOTES
[place] [facility] DE[facility]MENT ([facility])    12/08/23       ED PROVIDER NOTE      History     Chief Complaint   Patient presents with    Wound Check     HPI  Noe Florence is a 88 year old male with PMH of Atrial fibrillation on warfarin, Stage 3 CKD, CAD s/p CABG (1994, 2006) and PCI (2012), Vtach s/p ICD placement, DVT, asymptomatic iliac aneurysm who presents to the Emergency Department today due to swollen feet and wound infection.     Per chart review, patient has long history of lower extremity wounds.On 11/15/2023, he [facility] due to  left lower extremity wounds. His vascular labs were normal with adequate perfusion for healing and no stenosis was identified based on prior CTA imaging as well as ultrasound ABIs that showed adequate flow without significant PAD to lower extremities. No vascular surgical indication for repair of iliac aneurysm or PAD was suggested as it will not improve chances of wound healing and risks outweigh benefits at this time per vascular surgery note.     Patient has seen podiatry for left toe wound and completed a course of amoxicillin based antibiotic therapy with last dose sometime in the last week according to the patient's wife.  He has had progressive erythema and swelling as well as ischemic or gangrenous appearance of the affected toes during and after completion of outpatient antibiotics.  Denies fevers or chills.  No additional complaints at this time including chest pain, shortness of breath, abdominal pain, nausea, vomiting, or other concerns.    CTA ABDOMEN AND PELVIS (12/6/2023):  IMPRESSION:   Left common iliac aneurysm measures 3.1 x 3.0 x 3.1 cm,  previously 2.4 x 3.0 x 2.8 cm in 2021.    Duplex ultrasound of left lower extremity arteries (10/31/2023)  Impression:   Left leg: Atherosclerosis without evidence of hemodynamically  significant stenosis. Mildly ectatic external iliac and common femoral  arteries are  better appreciated on same day aortoiliac ultrasound.    Past Medical History  Past Medical History:   Diagnosis Date    Advanced open-angle glaucoma     Atrial fibrillation (H)     CKD (chronic kidney disease), stage III (H) 2005    Colon cancer (H)     Stage II-B colon cancer    Coronary artery disease     s/p CABG x 2, JEREMY x 2    Diverticulitis     Hyperlipidemia     Hypertension     Ischemic cardiomyopathy     MGUS (monoclonal gammopathy of unknown significance)     Nonsenile cataract     BE    Osteoporosis     left hip fracture    Polymorphic ventricular tachycardia (H)     Polymyalgia rheumatica (H24)     PVD (posterior vitreous detachment), both eyes 2005    S/P ablation of atrial flutter 6/20/14    CTI    Stented coronary artery     SVT (supraventricular tachycardia)     PPM/AICD for NSVT    Upper leg DVT (deep venous thromboembolism), chronic (H)     Left    Weight loss, unintentional 2017    15 lb in 4 months     Past Surgical History:   Procedure Laterality Date    CATARACT IOL, RT/LT Bilateral     COLONOSCOPY  3/13/2014    Procedure: COMBINED COLONOSCOPY, SINGLE BIOPSY/POLYPECTOMY BY BIOPSY;;  Surgeon: Mary Gerber MD;  Location:  GI    COLONOSCOPY N/A 6/22/2015    Procedure: COLONOSCOPY;  Surgeon: Marilin Newman MD;  Location:  GI    COLONOSCOPY N/A 11/7/2018    Procedure: COMBINED COLONOSCOPY, SINGLE OR MULTIPLE BIOPSY/POLYPECTOMY BY BIOPSY;  Surgeon: Roberto Esteban MD;  Location:  GI    EP ICD GENERATOR REPLACEMENT DUAL N/A 12/11/2018    Procedure: EP ICD Generator Change Dual;  Surgeon: Deedee Baird MD;  Location:  HEART CARDIAC CATH LAB    H ABLATION ATRIAL FLUTTER      HEART CATH DRUG ELUTING STENT PLACEMENT  4/19/2012    JEREMY x 2 to LCx    IMPLANT IMPLANTABLE CARDIOVERTER DEFIBRILLATOR  5-    ppm/aicd    KNEE SURGERY      right and left knee surgeries    LAPAROSCOPIC ASSISTED COLECTOMY Right 2/1/2019    Procedure: Laparoscopic Converted to Open Transverse  Colectomy with Lysis of Adhesions;  Surgeon: Chanelle Guzmán MD;  Location: [facility] OR    LAPAROSCOPIC ASSISTED COLECTOMY LEFT (DESCENDING)  4/8/2014    Procedure: LAPAROSCOPIC ASSISTED COLECTOMY LEFT (DESCENDING);  Hand assisted Laparoscopic Sigmoid Colectomy , Laparoscopic mobilization of spleenic flexure *Latex Allergy*Anesthesia General with Block;  Surgeon: Chanelle Guzmán MD;  Location: [facility] OR    OPEN REDUCTION INTERNAL FIXATION RODDING INTRAMEDULLAR FEMUR FRACTURE TABLE Left 3/14/2019    Procedure: Open Reduction Internal Fixation Left Femur Intramedullar Nailing;  Surgeon: Srikanth Avalos MD;  Location: [facility] OR    REPAIR VALVE MITRAL  2006     30-mm Medtronic Julian ring     SELECTIVE LASER TRABECULOPLASTY (SLT) OD (RIGHT EYE)  4/10, 1/12,+1/9/13    RE    SELECTIVE LASER TRABECULOPLASTY (SLT) OS (LEFT EYE)  5/2012    SHOULDER SURGERY      right rotator cuff    ZZC CABG, ARTERY-VEIN, FOUR  2006    LIMA-LAD, SVG-Rt PDA, SVG-OM2, SVG-Diag 1    [facility] CABG, VEIN, SINGLE  1994    1-vessel CABG, SVG->PDRCA      acetaminophen (TYLENOL) 500 MG tablet  alendronate (FOSAMAX) 70 MG tablet  bacitracin 500 UNIT/GM ophthalmic ointment  baclofen (LIORESAL) 10 MG tablet  calcium carbonate 500 mg, elemental, (OSCAL;OYSTER SHELL CALCIUM) 500 MG tablet  cephALEXin (KEFLEX) 500 MG capsule  cholecalciferol 1000 units TABS  COMPRESSION STOCKINGS  cyanocobalamin (VITAMIN B-12) 1000 MCG tablet  diclofenac (VOLTAREN) 1 % topical gel  Emollient (CERAVE SA RENEWING) LOTN  Emollient (CERAVE) CREA  ferrous sulfate (FE TABS) 325 (65 Fe) MG EC tablet  ketoconazole (NIZORAL) 2 % external cream  ketoconazole (NIZORAL) 2 % external cream  ketoconazole (NIZORAL) 2 % external shampoo  lactobacillus rhamnosus, GG, (CULTURELL) capsule  metoprolol tartrate (LOPRESSOR) 25 MG tablet  mirtazapine (REMERON) 15 MG tablet  Multiple Vitamins-Minerals (MULTIVITAMIN ADULT PO)  risperiDONE (RISPERDAL) 0.5 MG tablet  sennosides  (SENOKOT) 8.6 MG tablet  sertraline (ZOLOFT) 100 MG tablet  tamsulosin (FLOMAX) 0.4 MG capsule  urea (GORMEL) 20 % external cream  warfarin ANTICOAGULANT (JANTOVEN ANTICOAGULANT) 5 MG tablet      Allergies   Allergen Reactions    Latex Rash     Rash     Family History  Family History   Problem Relation Age of Onset    C.A.D. Father     Anesthesia Reaction No family hx of     Crohn's Disease No family hx of     Ulcerative Colitis No family hx of     Cancer - colorectal No family hx of     Macular Degeneration No family hx of     Cancer No family hx of         No known family hx of skin cancer    Melanoma No family hx of     Skin Cancer No family hx of      Social History   Social History     Tobacco Use    Smoking status: Former     Types: Cigarettes, Cigars     Quit date: 1968     Years since quittin.9    Smokeless tobacco: Never   Substance Use Topics    Alcohol use: Not Currently    Drug use: No         A medically appropriate review of systems was performed with pertinent positives and negatives noted in the HPI, and all other systems negative.    Physical Exam   BP: 107/61  Pulse: 60  Temp: 97.4  F (36.3  C)  Resp: 18  SpO2: 99 %  Physical Exam  Vitals and nursing note reviewed.   Constitutional:       General: He is not in acute distress.     Appearance: Normal appearance. He is not ill-appearing.   HENT:      Head: Normocephalic and atraumatic.      Mouth/Throat:      Mouth: Mucous membranes are moist.      Pharynx: Oropharynx is clear.   Eyes:      Extraocular Movements: Extraocular movements intact.      Conjunctiva/sclera: Conjunctivae normal.   Cardiovascular:      Rate and Rhythm: Normal rate and regular rhythm.   Pulmonary:      Effort: Pulmonary effort is normal. No respiratory distress.   Abdominal:      Palpations: Abdomen is soft.      Tenderness: There is no abdominal tenderness.   Musculoskeletal:         General: Swelling present. Normal range of motion.      Cervical back: Normal range  of motion and neck supple.      Left lower leg: Edema present.   Skin:     General: Skin is warm.      Findings: Lesion present.      Comments: Gangrenous appearance of left 2nd and 3rd toes with surrounding erythema up to left midfoot, warm to palpation, palpable DP pulses intact   Neurological:      Mental Status: He is alert.           ED Course, Procedures, & Data      Procedures            INR   Date Value Ref Range Status   12/08/2023 1.88 (H) 0.85 - 1.15 Final   07/05/2021 2.31 (H) 0.86 - 1.14 Final   06/07/2021 1.90 (H) 0.86 - 1.14 Final   05/12/2021 1.58 (H) 0.86 - 1.14 Final     INR (External)   Date Value Ref Range Status   11/27/2023 1.6 (A) 0.9 - 1.1 Final   11/06/2023 1.6 (A) 0.9 - 1.1 Final   10/24/2023 1.3 (A) 0.9 - 1.1 Final                    Results for orders placed or performed during the hospital encounter of 12/08/23   XR Foot Left G/E 3 Views     Status: None    Narrative    3 views left foot radiographs 12/8/2023 1:35 PM    History: infection of toes, concern for osteomyelitis     Comparison: Right foot radiographs 2/28/2016    Findings:    Nonweightbearing AP, oblique, and lateral  views of the left foot were  obtained.     Exam is partially limited by overlapping digits and osteopenic  appearance of the bones. External marker placed at the level of the  third/fourth digit distal phalanges. No definite sclerotic or erosive  change to the third or fourth digit. Hammer toe deformities of the  second through fifth digits.    Soft tissue wound of the dorsum of the great toe at the level of the  interphalangeal joint. Irregularity and erosive change to the  phalangeal tuft of the first digit distal phalanx.    Lisfranc articulation alignment is congruent on this non-weight  bearing study.    Mild degenerative changes of first metatarsophalangeal joint. Achilles  tendon insertional enthesopathy.       Impression    Impression:    1. Irregularity and erosive change to the phalangeal tuft of the  first  digit distal phalanx raising the concern for osteomyelitis however  this is not at the location of the external marker.  2. No definitive radiographic evidence for osteomyelitis involving the  third/fourth digits. If continued clinical concern for osteomyelitis  MRI can be considered.    I have personally reviewed the examination and initial interpretation  and I agree with the findings.    JUAN LUIS YOUSIF MD (Joe)         SYSTEM ID:  [id number]   INR     Status: Abnormal   Result Value Ref Range    INR 1.88 (H) 0.85 - 1.15   Partial thromboplastin time     Status: Normal   Result Value Ref Range    aPTT 33 22 - 38 Seconds   Comprehensive metabolic panel     Status: Abnormal   Result Value Ref Range    Sodium 141 135 - 145 mmol/L    Potassium 4.4 3.4 - 5.3 mmol/L    Carbon Dioxide (CO2) 20 (L) 22 - 29 mmol/L    Anion Gap 8 7 - 15 mmol/L    Urea Nitrogen 40.9 (H) 8.0 - 23.0 mg/dL    Creatinine 1.56 (H) 0.67 - 1.17 mg/dL    GFR Estimate 42 (L) >60 mL/min/1.73m2    Calcium 8.9 8.8 - 10.2 mg/dL    Chloride 113 (H) 98 - 107 mmol/L    Glucose 165 (H) 70 - 99 mg/dL    Alkaline Phosphatase 73 40 - 150 U/L    AST 15 0 - 45 U/L    ALT 9 0 - 70 U/L    Protein Total 6.5 6.4 - 8.3 g/dL    Albumin 3.5 3.5 - 5.2 g/dL    Bilirubin Total 0.2 <=1.2 mg/dL   Magnesium     Status: Normal   Result Value Ref Range    Magnesium 2.0 1.7 - 2.3 mg/dL   CRP inflammation     Status: Abnormal   Result Value Ref Range    CRP Inflammation 8.74 (H) <5.00 mg/L   Erythrocyte sedimentation rate auto     Status: Abnormal   Result Value Ref Range    Erythrocyte Sedimentation Rate 62 (H) 0 - 20 mm/hr   CBC with platelets and differential     Status: Abnormal   Result Value Ref Range    WBC Count 6.7 4.0 - 11.0 10e3/uL    RBC Count 2.71 (L) 4.40 - 5.90 10e6/uL    Hemoglobin 8.3 (L) 13.3 - 17.7 g/dL    Hematocrit 26.7 (L) 40.0 - 53.0 %    MCV 99 78 - 100 fL    MCH 30.6 26.5 - 33.0 pg    MCHC 31.1 (L) 31.5 - 36.5 g/dL    RDW 14.6 10.0 - 15.0 %     Platelet Count 159 150 - 450 10e3/uL    % Neutrophils 77 %    % Lymphocytes 9 %    % Monocytes 8 %    % Eosinophils 6 %    % Basophils 0 %    % Immature Granulocytes 0 %    NRBCs per 100 WBC 0 <1 /100    Absolute Neutrophils 5.1 1.6 - 8.3 10e3/uL    Absolute Lymphocytes 0.6 (L) 0.8 - 5.3 10e3/uL    Absolute Monocytes 0.6 0.0 - 1.3 10e3/uL    Absolute Eosinophils 0.4 0.0 - 0.7 10e3/uL    Absolute Basophils 0.0 0.0 - 0.2 10e3/uL    Absolute Immature Granulocytes 0.0 <=0.4 10e3/uL    Absolute NRBCs 0.0 10e3/uL   Lactic acid whole blood     Status: Normal   Result Value Ref Range    Lactic Acid 1.7 0.7 - 2.0 mmol/L   CBC with platelets differential     Status: Abnormal    Narrative    The following orders were created for panel order CBC with platelets differential.  Procedure                               Abnormality         Status                     ---------                               -----------         ------                     CBC with platelets and d...[[id number]]  Abnormal            Final result                 Please view results for these tests on the individual orders.     Medications - No data to display  Labs Ordered and Resulted from Time of ED Arrival to Time of ED Departure   INR - Abnormal       Result Value    INR 1.88 (*)    COMPREHENSIVE METABOLIC PANEL - Abnormal    Sodium 141      Potassium 4.4      Carbon Dioxide (CO2) 20 (*)     Anion Gap 8      Urea Nitrogen 40.9 (*)     Creatinine 1.56 (*)     GFR Estimate 42 (*)     Calcium 8.9      Chloride 113 (*)     Glucose 165 (*)     Alkaline Phosphatase 73      AST 15      ALT 9      Protein Total 6.5      Albumin 3.5      Bilirubin Total 0.2     CRP INFLAMMATION - Abnormal    CRP Inflammation 8.74 (*)    ERYTHROCYTE SEDIMENTATION RATE AUTO - Abnormal    Erythrocyte Sedimentation Rate 62 (*)    CBC WITH PLATELETS AND DIFFERENTIAL - Abnormal    WBC Count 6.7      RBC Count 2.71 (*)     Hemoglobin 8.3 (*)     Hematocrit 26.7 (*)     MCV 99      MCH  30.6      MCHC 31.1 (*)     RDW 14.6      Platelet Count 159      % Neutrophils 77      % Lymphocytes 9      % Monocytes 8      % Eosinophils 6      % Basophils 0      % Immature Granulocytes 0      NRBCs per 100 WBC 0      Absolute Neutrophils 5.1      Absolute Lymphocytes 0.6 (*)     Absolute Monocytes 0.6      Absolute Eosinophils 0.4      Absolute Basophils 0.0      Absolute Immature Granulocytes 0.0      Absolute NRBCs 0.0     PARTIAL THROMBOPLASTIN TIME - Normal    aPTT 33     MAGNESIUM - Normal    Magnesium 2.0     LACTIC ACID WHOLE BLOOD - Normal    Lactic Acid 1.7     BLOOD CULTURE   BLOOD CULTURE     XR Foot Left G/E 3 Views   Final Result   Impression:      1. Irregularity and erosive change to the phalangeal tuft of the first   digit distal phalanx raising the concern for osteomyelitis however   this is not at the location of the external marker.   2. No definitive radiographic evidence for osteomyelitis involving the   third/fourth digits. If continued clinical concern for osteomyelitis   MRI can be considered.      I have personally reviewed the examination and initial interpretation   and I agree with the findings.      JUAN LUIS YOUSIF MD (Joe)            SYSTEM ID:  [id number]             Critical care was not performed.     Medical Decision Making  The patient's presentation was of high complexity (a chronic illness severe exacerbation, progression, or side effect of treatment).    The patient's evaluation involved:  review of external note(s) from 1 sources (see separate area of note for details)  review of 3+ test result(s) ordered prior to this encounter (see separate area of note for details)  ordering and/or review of 3+ test(s) in this encounter (see separate area of note for details)  discussion of management or test interpretation with another health professional (see separate area of note for details)    The patient's management necessitated high risk (a decision regarding  hospitalization).    Assessment & Plan    Noe Florence is a 88 year old male with PMH of Atrial fibrillation on warfarin, Stage 3 CKD, CAD s/p CABG (1994, 2006) and PCI (2012), Vtach s/p ICD placement, DVT, asymptomatic iliac aneurysm who presents to the Emergency Department today due to swollen feet and wound infection.  Patient completed a course of antibiotic therapy and had worsening wound and infection despite antibiotics.  He has had ABIs previously that showed no significant peripheral arterial disease in the lower extremities, and he had a vascular surgery visit at which time his lower extremity wound was evaluated and the vascular surgeon stated that patient had adequate flow to the lower extremities without need for operative intervention at this time.  Podiatry is following the patient for ongoing infection.    Patient is nontoxic-appearing department, his vital signs within normal limits and is afebrile.  He does have gangrenous appearing toes on the left foot with surrounding swelling and erythema extending up to the midfoot.  He does have palpable DP pulses.  Foot x-ray is suggestive of bone erosion concerning for osteomyelitis in the foot.  He was started on IV vancomycin.  Prior labs reviewed and patient had a prior hemoglobin A1c of 5.7 most recently, does not have active diabetes, though may still require expansion of antibiotic therapy depending on wound culture results.  Discussed with admitting medicine inpatient team, and patient was admitted for further care.    I have reviewed the nursing notes. I have reviewed the findings, diagnosis, plan and need for follow up with the patient.    New Prescriptions    No medications on file       Final diagnoses:   Other acute osteomyelitis of left foot (H)   Cellulitis of left lower extremity       Noe Coelho MD  [facility] EMERGENCY DEPARTMENT  12/8/2023     Noe Coelho MD  12/08/23 [id number]

## 2023-12-09 NOTE — PROGRESS NOTES
"   12/09/23 1600   Appointment Info   Signing Clinician's Name / Credentials (PT) Perlita Zaragoza DPT   Living Environment   People in Home spouse   Current Living Arrangements house   Home Accessibility wheelchair accessible   Transportation Anticipated family or friend will provide;health plan transportation   Living Environment Comments Pt lives in house with wife, has ramp to enter then all needs met on main level.   Self-Care   Usual Activity Tolerance fair   Current Activity Tolerance poor   Regular Exercise No   Equipment Currently Used at Home wheelchair, manual;other (see comments)  (standing transfer device (like SaraStedy))   Fall history within last six months yes   Activity/Exercise/Self-Care Comment Pt has caregiver support for 2 hrs in AM + 2 hrs in PM. Cargivers have been using standing transfer device (like a SaraStedy) for all transfers. Per wife, pt has had limited tolerance for standing in the last month due to L toe infection so caregivers have been dependently lifting pt with Ax2. Per wife, pt typically transfers to chair in the morning and spends the day there, then is transferred back to bed at night. Caregivers are able to come prn during the day if pt \"needs to have a BM\".   General Information   Onset of Illness/Injury or Date of Surgery 12/08/23   Referring Physician Ximena Blankenship MD   Patient/Family Therapy Goals Statement (PT) Return to standing   Pertinent History of Current Problem (include personal factors and/or comorbidities that impact the POC) Mr. 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 Patient was seen by vascular surgery on 11/15/23 with no plan for surgical intervention. Patient has previously seen Dr. Boudreaux with Podiatry but currently follows with a podiatrist in another system. This provider actually recommended the " patient be seen in the ED.     He is seen with wife at bedside. They live together and he receives home cares.   Existing Precautions/Restrictions fall   Cognition   Affect/Mental Status (Cognition) low arousal/lethargic   Orientation Status (Cognition) oriented to;person   Follows Commands (Cognition) follows one-step commands;75-90% accuracy   Cognitive Status Comments Pt with minimal communication today and very slow to respond generally. Pt held water in mouth for ~50% of session and had to be told by wife to swallow.   Pain Assessment   Patient Currently in Pain Yes, see Vital Sign flowsheet   Integumentary/Edema   Integumentary/Edema Comments L 2nd toe mass/infection   Posture    Posture Forward head position;Protracted shoulders   Range of Motion (ROM)   ROM Comment bilat knee ROM limited ~90-15 degrees flexion; ankle ROM limited bilat; all other ROM functional but limited 2/2 weakness   Strength (Manual Muscle Testing)   Strength (Manual Muscle Testing) Deficits observed during functional mobility   Strength Comments Global weakness noted. Able to demo LAQ and marching through partial ROM when seated in recliner. Able to lift water cup to mouth IND. Able to sit EOB SBA. All movement very slow and pt slow to initiate.   Bed Mobility   Bed Mobility rolling left;scooting/bridging;supine-sit   Rolling Left [place] (Bed Mobility) moderate assist (50% patient effort);verbal cues   Scooting/Bridging [place] (Bed Mobility) moderate assist (50% patient effort);verbal cues   Supine-Sit [place] (Bed Mobility) moderate assist (50% patient effort);verbal cues   Bed Mobility Limitations cognitive deficits;decreased ability to use arms for pushing/pulling;decreased ability to use legs for bridging/pushing;impaired ability to control trunk for mobility   Impairments Contributing to Impaired Bed Mobility pain;decreased ROM;decreased strength   Assistive Device (Bed Mobility) bed rails   Transfers   Comment,  (Transfers) Dependent   Gait/Stairs (Locomotion)   Comment, (Gait/Stairs) Dependent   Balance   Balance Comments fair sitting balance; standing + dynamic balance not assessed   Sensory Examination   Sensory Perception Comments bilat peripheral neuropathy   Coordination   Coordination Comments slow to initiate movement   Muscle Tone   Muscle Tone Comments globally atrophied   Clinical Impression   Criteria for Skilled Therapeutic Intervention Yes, treatment indicated   PT Diagnosis (PT) Impaired functional mobility; Decreased activity tolerance   Influenced by the following impairments L toe pain, weakness, cognition   Functional limitations due to impairments bed mobility, transfers, gait, functional endurance   Clinical Presentation (PT Evaluation Complexity) evolving   Clinical Presentation Rationale clinical judgement   Clinical Decision Making (Complexity) moderate complexity   Planned Therapy Interventions (PT) balance training;bed mobility training;gait training;home exercise program;neuromuscular re-education;patient/family education;postural re-education;ROM (range of motion);stair training;strengthening;transfer training;progressive activity/exercise;risk factor education;home program guidelines   Risk & Benefits of therapy have been explained evaluation/treatment results reviewed;care plan/treatment goals reviewed;risks/benefits reviewed;current/potential barriers reviewed;participants voiced agreement with care plan;participants included;patient;spouse/significant other   PT Total Evaluation Time   PT Eval, Moderate Complexity Minutes (55624) 13   Physical Therapy Goals   PT Frequency Daily   PT Predicted Duration/Target Date for Goal Attainment 12/30/23   PT Goals Bed Mobility;Transfers   PT: Bed Mobility Minimal assist;Supine to/from sit;Rolling;Bridging   PT: Transfers Moderate assist;Sit to/from stand;Bed to/from chair;Assistive device  (with use of SaraStedy)   Therapeutic Activity   Therapeutic  Activities: dynamic activities to improve functional performance Minutes (32903) 17   Treatment Detail/Skilled Intervention Pt greeted supine in bed with wife present, agreeable to therapy. Pt initially not speaking at all, however, appearing to follow conversation with eyes. Instructed pt in walking LEs to EOB, pt requiring Mari at bilat LEs. Then requiring Mari for rolling to L and sidelying>sit. Once upright, maxA provided to scoot closer to EOB. Pt then requiring SBA for static sitting, tolerating ~8 min. Donned sling in seated and dependently transferred EOB>recliner via OH lift. Once in recliner, instructed in the following exercises to promote LE strength and mobility: seated marching, seated LAQs, and APs - all x10 reps each requiring min-modA for full ROM. Encouraged pt and wife to complete multiple times throughout the day. Following intervention, postioned pt comfortable with legs partially elevated, limited due to knee ROM. Left with all needs met at PT departure.   PT Discharge Planning   PT Plan Trial sit<>stand via SaraStedy with pt shoes donned (wife should be bringing these), if pt able to transfer safely via SS anticipate appropriate to decrease frequency   PT Discharge Recommendation (DC Rec) home with assist;home with home care physical therapy;Transitional Care Facility   PT Rationale for DC Rec Pt is far below baseline, was previously transferring via standing transfer device (ie. SaraStedy), however, has recently been requiring dependent transfers via Ax2. Currently with dual rec, however, pending ability to tolerate SaraStedy, pt may be safe to dc home with assist from wife and daily caregivers. Pt currently not safe to dc home. Will continue to assess and update recs as appropriate.   PT Brief overview of current status OH lift   Total Session Time   Timed Code Treatment Minutes 17   Total Session Time (sum of timed and untimed services) 30

## 2023-12-09 NOTE — PHARMACY-ANTICOAGULATION SERVICE
Clinical Pharmacy - Warfarin Dosing Consult     Pharmacy has been consulted to manage this patient s warfarin therapy.  Indication: Atrial Fibrillation  Therapy Goal: INR 2-3  Warfarin Prior to Admission: Yes  Warfarin PTA Regimen: 5 mg on Sunday, Wednesday and Friday; 2.5 mg daily all other days.  Significant drug interactions: mirtazapine, sertraline  Dose Comments: See pharmacy medication history note 12/8/23    INR   Date Value Ref Range Status   12/08/2023 1.88 (H) 0.85 - 1.15 Final     INR (External)   Date Value Ref Range Status   11/27/2023 1.6 (A) 0.9 - 1.1 Final       Recommend warfarin 5 mg today.  Pharmacy will monitor Noe Florence daily and order warfarin doses to achieve specified goal.      Please contact pharmacy as soon as possible if the warfarin needs to be held for a procedure or if the warfarin goals change.       Gavi Canas, PharmD

## 2023-12-09 NOTE — PHARMACY-VANCOMYCIN DOSING SERVICE
Pharmacy Vancomycin Initial Note  Date of Service 2023  Patient's  1935  88 year old, male    Indication: MRSA and Osteomyelitis    Creatinine for last 3 days  2023:  2:57 PM Creatinine POCT 1.7 mg/dL  2023:  1:11 PM Creatinine 1.56 mg/dL  Current estimated CrCl = Estimated Creatinine Clearance: 32.5 mL/min (A) (based on SCr of 1.56 mg/dL (H)).    Recent Vancomycin Level(s) for last 3 days  No results found for requested labs within last 3 days.      Vancomycin IV Administrations (past 72 hours)                     vancomycin (VANCOCIN) 1,500 mg in 0.9% NaCl 250 mL intermittent infusion (mg) 1,500 mg New Bag 23                  Nephrotoxins and other renal medications (From now, onward)      Start     Dose/Rate Route Frequency Ordered Stop    23  vancomycin (VANCOCIN) 750 mg in sodium chloride 0.9 % 250 mL intermittent infusion         750 mg  over 90 Minutes Intravenous EVERY 24 HOURS 23  vancomycin (VANCOCIN) 1,500 mg in 0.9% NaCl 250 mL intermittent infusion         1,500 mg  over 90 Minutes Intravenous ONCE 23 193              Contrast Orders - past 72 hours (72h ago, onward)      None            [employer] Prediction of Planned Initial Vancomycin Regimen  Loading dose: 1500mg IV x 1 2023 @   Regimen: 750 mg IV every 24 hours.  Start time: 12:00 on 2023  Exposure target: AUC24 (range)400-600 mg/L.hr   AUC24,ss: 466 mg/L.hr  Probability of AUC24 > 400: 67 %  Ctrough,ss: 15.5 mg/L  Probability of Ctrough,ss > 20: 25 %  Probability of nephrotoxicity (Lodise JUNIOR ): 11 %          Plan:  Start vancomycin  1500 mg IV once  @  then 750mg IV q24h to start 18hr after load.   Vancomycin monitoring method: AUC  Vancomycin therapeutic monitoring goal: 400-600 mg*h/L  Pharmacy will check vancomycin levels as appropriate in 1-3 Days.    Serum creatinine levels will be ordered a minimum of twice weekly.       Padmini Pena, Pharm.D., BCPS, [facility]

## 2023-12-09 NOTE — CONSULTS
Brief Orthopedic Surgery/Podiatry Note    S: Mr. 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 Patient was seen by vascular surgery on 11/15/23 with no plan for surgical intervention. Patient has previously seen Dr. Boudreaux with Podiatry but currently follows with a podiatrist in another system. This provider actually recommended the patient be seen in the ED.    He is seen with wife at bedside. They live together and he receives home cares.     O:  Temp: 98.3  F (36.8  C) Temp src: Oral BP: 123/57 Pulse: 59   Resp: 16 SpO2: 100 % O2 Device: None (Room air)      Exam:  Gen: No acute distress, resting comfortably in bed. Does not participate in exam. Does not speak. Closes eyes intermittently  Resp: Non-labored breathing on RA  MSK:  LLE  - off loading boot removed  - ulcer over dorsum of left 2nd toe  - no active drainage. No significant surrounding erythema  - overall foot is warm          Recent Labs   Lab 12/09/23  0738 12/08/23  1311   WBC 8.1 6.7   HGB 8.8* 8.3*    159     Hemoglobin A1C   Date Value Ref Range Status   12/08/2023 6.0 (H) <5.7 % Final     Comment:     Normal <5.7%   Prediabetes 5.7-6.4%    Diabetes 6.5% or higher     Note: Adopted from ADA consensus guidelines.   02/27/2012 5.7 4.3 - 6.0 % Final         Left foot XR and CT on 12/8/23 demonstrates focal bone demineralization and cortical resorption in the 2nd toe proximal phalanx head and middle phalanx concerning for osteomyelitis.      Assessment: Noe Florence is a 88 year old male with PAD with chronic left foot wounds with left 2nd toe proximal phalanx osteomyelitis and likely pressure wound in setting of neuropathy. No significant vascular disease and mild DM2 (last A1c 6.2).    Patient currently stable with normal WBC and stable vital signs. No evidence of systemic infection. No  indication for surgical intervention at this time. Plan for medical management of osteomyelitis with antibiotics - oral vs IV per ID team. Patient can be referred to outpatient podiatry for follow up. Will message Dr. Boudreaux to make him aware of patient's hospitalization. If he is still inpatient next week, can be seen by Podiatry for evaluation for bedside debridement. Recommend WOC consult for ongoing wound cares.     Orthopedics will continue to monitor the patient peripherally. Please reach out with questions or concerns.     Plan:  Medicine Primary  Activity: Up to chair, as able. Recommend offloader boot (currently in place)  Weight bearing status: NWB LLE for now to offload foot.   Antibiotics: per ID  Diet: Ok from orthopedic perspective  DVT prophylaxis: per primary  Dressings: per WOC  Pain management: multimodal  Imaging: Completed  Consults: Recommend WOC  Follow-up: See local podiatrist within one week of discharge. Anticipate need for ongoing wound cares which his wife says they can provide.  Disposition: per primary    Patient will be discussed with staff, Dr. Boudreaux.    John Arteaga MD  Orthopaedic Surgery PGY-1  [phone]     Kori Franz MD, PGY-4  Orthopedics  Pager: [phone]

## 2023-12-09 NOTE — PROGRESS NOTES
[facility]    Medicine Progress Note - Hospitalist Service, GOLD TEAM 17    Date of Admission:  12/8/2023    Addendum:  - Discussed with Orthopaedic Surgery. Will hold off on obtaining MRI. Wound care to see patient.       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 Patient presented on 08-Dec-2023 with swollen feet and wound infection. Patient reportedly had completed a course of antibiotic therapy but wounds had carmen worsening despite antibiotics.    Imaging is suggestive of left foot 2nd toe osteomyelitis.     From chart review, patient had been seen by Vascular Surgery on 15-Nov-2023. Patient was deemed to have nonsignificant peripheral vascular disease and great healing potential. It was felt that there was no role for further vascular imaging or intervention to improve healing potential. Patient was felt to be a high surgical risk for repair of left common iliac aneurysm which may be reaching a size of 3.5 cm.     P:  1.) Left foot 2nd toe wound with concern for osteomyelitis:  - Patient empirically on IV vancomycin.  - Possible MRI if patient's ICD is MRI compatible.  - Will consult Orthopaedic Surgery.  - Consulting Infectious Disease for antibiotic recommendations.    2.) Atrial fibrillation - patient on long-term anticoagulation with coumadin:  - Patient continuing coumadin for anticoagulation. INR subtherapeutic today with goal INR 2-3.  - Patient continuing metoprolol.    3.) Chronic kidney disease stage III, unclear if stage IIIa or stage IIIb:  - Baseline creatinine  appears to be 1.4-1.8.  - Avoiding nephrotoxins.    4.) Polyneuropathy:  - Patient to f/u with Neurology as outpatient.    5.) Confusion:  - Will need to determine patient's baseline mental status with family.    6.) Depression, anxiety:  - Patient on remeron, risperidal and zoloft as outpatient and these have been continued.    7.) Osteoporosis:  - Patient usually on fosamax.  - Further monitoring and management as outpatient.    8.) Monoclonal gammopathy of unclear significance:  - Further monitoring as outpatient.  - Per outpatient notes, patient is no longer seeing Oncology for this problem.    9.) Left common iliac artery aneurysm:  - Patient to f/u with Vascular Surgery as outpatient.  - Per outpatient Vascular Surgery note, possible repair if aneurysm is over 4 cm.    10.) History of colon cancer:  - Further surveillance as outpatient.    11.) History of DVT:  - Patient already on coumadin for anticoagulation.    12.) Polymorphic ventricular tachycardia s/p ICD placement in 2012:  - Further monitoring as outpatient.            Diet: Regular Diet Adult    Dominguez Catheter: Not present  Lines: None     Cardiac Monitoring: None  Code Status: Full Code      Clinically Significant Risk Factors Present on Admission               # Drug Induced Coagulation Defect: home medication list includes an anticoagulant medication    # Hypertension: Noted on problem list           # ICD device present       Disposition Plan     Expected Discharge Date: 12/10/2023                    Benji Solis MD  Hospitalist Service, GOLD TEAM [address] [place] [address]  Securely message with Done. (more info)  Text page via [facility] Paging/Directory   See signed in provider for up to date coverage information  ______________________________________________________________________    Interval History   Patient was confused and not answering questions appropriately.    Physical Exam   Vital Signs: Temp: 98.3  F  (36.8  C) Temp src: Oral BP: 123/57 Pulse: 59   Resp: 16 SpO2: 100 % O2 Device: None (Room air)    Weight: 155 lbs 0 oz    General: Patient comfortable, NAD. Patient confused and not answering questions appropriately.  Heart: RRR, S1 S2 with soft systolic murmur.  Extremities: Darkened tissue at lower portion of 2nd toe left foot.    Labs noted.   Sodium 139; Potassium 4.1; Creatinine 1.31;  WBC 8.1; Hb 8.8; Platelets 159    CT left foot:  1.  CT findings highly suspicious for osteomyelitis of the 2nd toe proximal and middle phalanges.  2.  Soft tissue attenuation and probable ulceration at the dorsal margin of the 2nd toe.  3.  Subcutaneous edema in the dorsal foot, nonspecific.  4.  Moderate/advanced generalized bone demineralization.  5.  No fracture or joint dislocation.    Medical Decision Making

## 2023-12-09 NOTE — CONSULTS
"    General ID Service: Initial Consultation     Patient:  Noe Florence, Date of birth 1935, Medical record number [id number]  Date of Visit:  December 9, 2023  Consult Requested by:         Assessment and Recommendations:   ID Problem List:    CKD  Atrial fibrillation  CAD s/p CABP  MGUS  No antibiotic allergies  Osteomyelitis of L 2nd toe    Recommendations:    -stop vancomycin given negative MRSA nares  -start zosyn given hx of DM  -check QTc on EKG for possibility of fluoroquinolones    Discussion:    Patient is 88 with chronic wounds on his feet. A CT of his L foot shows osteomyelitis of his L 2nd toe. He is on vancomycin given history of MRSA. However, MRSA nares negative this time. I think given chronicity of wounds and hx of DM I would stop vancomycin and start Zosyn. I will order an EKG to check his QTc as ciprofloxacin or levofloxacin would be good oral options.     I will follow along. Please call with questions.      Attestation:  I have reviewed today's vital signs, medications, labs and imaging.  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      Two month history of progressive wounds on feet. Evaluated by vascular surgery and work up for PAD as cause of the wounds was negative. R 2nd toe wound has increased over the past 1-2 weeks and spread to inter-digit space between 1st/2nd toes. Over the past several days it has been having more purulent and bloody drainage. He is followed by Podiatry, and have been attempting an outpatient course of amoxicillin but wound has increased despite this. Wife reports they were thinking about coming in for the past 1-2 days but were hoping it would get better.       Patient " "lives at home in a house with his wife. They have homecare for medications, and he gets assistance for toileting. Over the past 1-2 months has been less mobile than previously, cannot say whether it is due to pain in feet/toe or if just generalized weakness. Recently denies any fevers, chills, chest pain, shortness of breath, nausea, vomiting, diarrhea, chest pain, shortness of breath. Has some trace lower extremity edema that is stable. Good appetite. No pain currently, and no other complaints.\"    Pt is a difficult historian. However, no pain. He thinks his wound has been ongoing for some time.     On 11/15/23 he saw vascular surgery about his chronic wounds on his toes. They reported no significant stenosis on workup with ultrasound.          Review of Systems:   Full 12 point ROS obtained, pertinent positives and negatives as above.       Past Medical History:     Past Medical History:   Diagnosis Date    Advanced open-angle glaucoma     Atrial fibrillation (H)     CKD (chronic kidney disease), stage III (H) 2005    Colon cancer (H)     Stage II-B colon cancer    Coronary artery disease     s/p CABG x 2, JEREMY x 2    Diverticulitis     Hyperlipidemia     Hypertension     Ischemic cardiomyopathy     MGUS (monoclonal gammopathy of unknown significance)     Nonsenile cataract     BE    Osteoporosis     left hip fracture    Polymorphic ventricular tachycardia (H)     Polymyalgia rheumatica (H24)     PVD (posterior vitreous detachment), both eyes 2005    S/P ablation of atrial flutter 6/20/14    CTI    Stented coronary artery     SVT (supraventricular tachycardia)     PPM/AICD for NSVT    Upper leg DVT (deep venous thromboembolism), chronic (H)     Left    Weight loss, unintentional 2017    15 lb in 4 months     Past Surgical History:   Procedure Laterality Date    CATARACT IOL, RT/LT Bilateral     COLONOSCOPY  3/13/2014    Procedure: COMBINED COLONOSCOPY, SINGLE BIOPSY/POLYPECTOMY BY BIOPSY;;  Surgeon: Mary Gerber " Irene Potter MD;  Location:  GI    COLONOSCOPY N/A 6/22/2015    Procedure: COLONOSCOPY;  Surgeon: Marilin Newman MD;  Location:  GI    COLONOSCOPY N/A 11/7/2018    Procedure: COMBINED COLONOSCOPY, SINGLE OR MULTIPLE BIOPSY/POLYPECTOMY BY BIOPSY;  Surgeon: Roberto Esteban MD;  Location:  GI    EP ICD GENERATOR REPLACEMENT DUAL N/A 12/11/2018    Procedure: EP ICD Generator Change Dual;  Surgeon: Deedee Baird MD;  Location:  HEART CARDIAC CATH LAB    H ABLATION ATRIAL FLUTTER      HEART CATH DRUG ELUTING STENT PLACEMENT  4/19/2012    JEREMY x 2 to LCx    IMPLANT IMPLANTABLE CARDIOVERTER DEFIBRILLATOR  5-    ppm/aicd    KNEE SURGERY      right and left knee surgeries    LAPAROSCOPIC ASSISTED COLECTOMY Right 2/1/2019    Procedure: Laparoscopic Converted to Open Transverse Colectomy with Lysis of Adhesions;  Surgeon: Chanelle Guzmán MD;  Location:  OR    LAPAROSCOPIC ASSISTED COLECTOMY LEFT (DESCENDING)  4/8/2014    Procedure: LAPAROSCOPIC ASSISTED COLECTOMY LEFT (DESCENDING);  Hand assisted Laparoscopic Sigmoid Colectomy , Laparoscopic mobilization of spleenic flexure *Latex Allergy*Anesthesia General with Block;  Surgeon: Chanelle Guzmán MD;  Location:  OR    OPEN REDUCTION INTERNAL FIXATION RODDING INTRAMEDULLAR FEMUR FRACTURE TABLE Left 3/14/2019    Procedure: Open Reduction Internal Fixation Left Femur Intramedullar Nailing;  Surgeon: Srikanth Avalos MD;  Location:  OR    REPAIR VALVE MITRAL  2006     30-mm Medtronic Julian ring     SELECTIVE LASER TRABECULOPLASTY (SLT) OD (RIGHT EYE)  4/10, 1/12,+1/9/13    RE    SELECTIVE LASER TRABECULOPLASTY (SLT) OS (LEFT EYE)  5/2012    SHOULDER SURGERY      right rotator cuff    ZZC CABG, ARTERY-VEIN, FOUR  2006    LIMA-LAD, SVG-Rt PDA, SVG-OM2, SVG-Diag 1    ZC CABG, VEIN, SINGLE  1994    1-vessel CABG, SVG->PDRCA          Allergies:      Allergies   Allergen Reactions    Latex Rash     Rash            Current  Antimicrobials:     IV vancomycin       Family History:     Family History   Problem Relation Age of Onset    C.A.D. Father     Anesthesia Reaction No family hx of     Crohn's Disease No family hx of     Ulcerative Colitis No family hx of     Cancer - colorectal No family hx of     Macular Degeneration No family hx of     Cancer No family hx of         No known family hx of skin cancer    Melanoma No family hx of     Skin Cancer No family hx of             Social History:     Social History     Socioeconomic History    Marital status:      Spouse name: Not on file    Number of children: Not on file    Years of education: Not on file    Highest education level: Not on file   Occupational History    Not on file   Tobacco Use    Smoking status: Former     Types: Cigarettes, Cigars     Quit date: 1968     Years since quittin.9    Smokeless tobacco: Never   Substance and Sexual Activity    Alcohol use: Not Currently    Drug use: No    Sexual activity: Not on file   Other Topics Concern    Parent/sibling w/ CABG, MI or angioplasty before 65F 55M? Not Asked   Social History Narrative    Not on file     Social Determinants of Health     Financial Resource Strain: Not on file   Food Insecurity: Not on file   Transportation Needs: Not on file   Physical Activity: Not on file   Stress: Not on file   Social Connections: Not on file   Interpersonal Safety: Not on file   Housing Stability: Not on file              Physical Exam:   Ranges forvital signs:  Temp:  [97  F (36.1  C)-98.3  F (36.8  C)] 98.3  F (36.8  C)  Pulse:  [59-68] 59  Resp:  [16-18] 16  BP: (107-147)/(47-69) 123/57  SpO2:  [99 %-100 %] 100 %    Intake/Output Summary (Last 24 hours) at 2023 1211  Last data filed at 2023 1000  Gross per 24 hour   Intake 480 ml   Output --   Net 480 ml       Exam:  GENERAL:  well-developed, well-nourished, sitting in bed in no acute distress.   ENT:  Head is normocephalic, atraumatic. Oropharynx is moist  without exudates or ulcers.  EYES:  Eyes have anicteric sclerae.    NECK:  Supple.  LUNGS:  Clear to auscultation.  CARDIOVASCULAR:  Regular rate and rhythm with no murmurs, gallops or rubs.  ABDOMEN:  Normal bowel sounds, soft, nontender.  EXT: Extremities warm. L foot with drainage and erythema on 2nd toe. Most does have deformities with them being curled over. Not otherwise swollen.   SKIN:  No acute rashes.  Line is in place without any surrounding erythema.  NEUROLOGIC:  Grossly nonfocal.         Laboratory Data:   Reviewed.  Pertinent for:    WBC 8.1  CRP 8.74  ESR 62    Culture data:           Collected Updated Procedure Result Status    12/08/2023 1724 12/08/2023 1850 MRSA MSSA PCR, Nasal Swab [[id number]]    Swab from Nares, Bilateral    Final result Component Value   MRSA Target DNA Negative   SA Target DNA Negative          12/08/2023 1319 12/09/2023 0246 Blood Culture Peripheral Blood [[id number]]   Peripheral Blood    Preliminary result Component Value   Culture No growth after 12 hours P             12/08/2023 1311 12/09/2023 0246 Blood Culture Peripheral Blood [[id number]]   Peripheral Blood    Preliminary result Component Value   Culture No growth after 12 hours P                      Imaging:      CT Foot Left w/o Contrast [[id number]] Collected: 12/08/23 1852   Order Status: Completed Updated: 12/08/23 1922   Narrative:     EXAM: CT FOOT LEFT W/O CONTRAST  LOCATION: [facility]  DATE: 12/8/2023    INDICATION: 88-year-old patient with a right 2nd toe infection.  COMPARISON: 12/08/2023 radiographs.  TECHNIQUE: Noncontrast. Axial, sagittal and coronal thin-section reconstruction. Dose reduction techniques were used.    FINDINGS:    BONES:  -There is focal bone demineralization and cortical resorption in the 2nd toe proximal phalanx head and middle phalanx.  -No fracture or joint dislocation.  -Moderate/advanced generalized bone  demineralization.  -Multiple hammertoe deformities.    SOFT TISSUES:  -Soft tissue attenuation and probable ulceration at the dorsal margin of the 2nd toe.  -Subcutaneous thickening in the dorsal foot.  -Advanced muscle fatty atrophy.   Impression:     IMPRESSION:  1.  CT findings highly suspicious for osteomyelitis of the 2nd toe proximal and middle phalanges.  2.  Soft tissue attenuation and probable ulceration at the dorsal margin of the 2nd toe.  3.  Subcutaneous edema in the dorsal foot, nonspecific.  4.  Moderate/advanced generalized bone demineralization.  5.  No fracture or joint dislocation.      38 yo F with PMH of Nephrolithiasis (Right sided: 2012, May 2013 s/p lithotripsy; Left sided: 2017) presented to the ED with complaints of worsening Right flank pain since 3AM today. Stated that this current episode feels similar to her previous kidney stones. Described pain as sharp/stabbing, radiating to her RLQ/groin, and rated as a 9/10 in severity. Patient noted that symptoms had started on , at which she had gone to Urgent Care and was started on Ciprofloxacin, Flomax, and Motrin. Stated that symptoms had been improving until this AM. In addition, reported chills, diaphoresis, nausea, vomiting (1 episode, nonbloody). Denied fever, chest pain, palpitations, SOB, cough, diarrhea, constipation, urinary frequency, urgency, or dysuria, headaches, changes in vision, dizziness, numbness, tingling, recent travel, or sick contacts.    Of note, patient stated that she recently has  with no complications 8 weeks ago.    In the ED, patient was hemodynamically stable. Labs significant for leukocytosis (WBC 11.4), elevated lactate (6.4), and hypokalemia (K 3.3). EKG showed NSR @ 60bpm, prolonged QT 550ms noted. UA showed trace LE, large blood. CT Renal Stone [facility] showed moderate hydronephrosis secondary to a 7 mm calculus right UPJ. Additional bilateral nonobstructive nephrolithiasis. Received NS Bolus x3L, Rocephin 1 gram IV x1, Morphine 4mg IV x2, Dilaudid 0.5mg IV x1, and Zofran 4mg IV x1.  Pt admitted for sepsis secondary to ureteral colic obstruction at [facility], followed on the telemetry floor.    In the hospital, patient was managed with IV fluids, pain control(Motrin, Dilaudid, Morphine) and Rocephin.  Pt was seen by Dr. Felton, Urology, who recommended patient for stent placement.  Pt had procedure and tolerated it well.  She felt well post procedure, denied any pain, hematuria, fever or chills.  She is discharged home on Antibiotics and will follow up with Dr. Felton and Dr. Castañeda, Cardiology within 3-4 days. 38 yo F with PMH of Nephrolithiasis (Right sided: 2012, May 2013 s/p lithotripsy; Left sided: 2017) presented to the ED with complaints of worsening Right flank pain since 3AM today. Stated that this current episode feels similar to her previous kidney stones. Described pain as sharp/stabbing, radiating to her RLQ/groin, and rated as a 9/10 in severity. Patient noted that symptoms had started on , at which she had gone to Urgent Care and was started on Ciprofloxacin, Flomax, and Motrin. Stated that symptoms had been improving until this AM. In addition, reported chills, diaphoresis, nausea, vomiting (1 episode, nonbloody). Denied fever, chest pain, palpitations, SOB, cough, diarrhea, constipation, urinary frequency, urgency, or dysuria, headaches, changes in vision, dizziness, numbness, tingling, recent travel, or sick contacts.    Of note, patient stated that she recently has  with no complications 8 weeks ago.    In the ED, patient was hemodynamically stable. Labs significant for leukocytosis (WBC 11.4), elevated lactate (6.4), and hypokalemia (K 3.3). EKG showed NSR @ 60bpm, prolonged QT 550ms noted. UA showed trace LE, large blood. CT Renal Stone [facility] showed moderate hydronephrosis secondary to a 7 mm calculus right UPJ. Additional bilateral nonobstructive nephrolithiasis. Received NS Bolus x3L, Rocephin 1 gram IV x1, Morphine 4mg IV x2, Dilaudid 0.5mg IV x1, and Zofran 4mg IV x1.  Pt admitted for sepsis secondary to ureteral colic obstruction at [facility], followed on the telemetry floor.    In the hospital, patient was managed with IV fluids, pain control(Motrin, Dilaudid, Morphine) and Rocephin.  Pt was seen by Dr. Felton, Urology, who recommended patient for stent placement.  Pt had procedure and tolerated it well.  She felt well post procedure, denied any pain, hematuria, fever or chills.  She is discharged home on Antibiotics and will follow up with Dr. Felton and Dr. Castañeda, Cardiology within 3-4 days.    Refer to progress note for vital signs, ROS, labs, and Physical Exam.

## 2023-12-09 NOTE — PLAN OF CARE
"Goal Outcome Evaluation:                 Outcome Evaluation: ID, PT and ortho consulted today. WOC RN to consult.      VS: /57 (BP Location: Right arm)   Pulse 59   Temp 98.3  F (36.8  C) (Oral)   Resp 16   Ht 1.702 m (5' 7.01\")   Wt 70.3 kg (155 lb)   SpO2 100%   BMI 24.27 kg/m    Denies SOB, CP, N/T   O2: RA. LS CTA.   Output: Incontinent bladder in brief, checking with repo   Last BM: 12/9 very large in BSC   Activity: Up with lift, Ax1-2 in bed.    Skin: Left 2nd toe wound  Left shin skin tear/scab  Redness blanchable to sacral area and bilateral heels- mepilex applied and ROOKE boots applied, KISHAN pump applied to bed  Bruising R forearm  WOC RN consult ordered   Pain: Denies   CMS: Intact. AxO to self, at times oriented to place and situation, Baseline dementia. Wife at bedside reports he is a little slower to respond than normal , but she believes he is at baseline of orientation as he is remembering the actors in the movie they are watching. Wife believes the confusion was only exacerbated by the late night ED transfer to unit.    Dressing: L shin gauze applied for some bleeding. Sacral mepilex applied for protection   Diet: Reg. Assist to order and tray set up. Good appetite  Pills crushed in applesauce, except for meds listed in MAR note per wife   LDA: L PIV infusing, R PIV SL sluggish   Equipment: IV pole. ROOKE boots. KISHAN pump   Plan: Ortho, ID consults, PT consult  WOC RN to see monday   Additional Info: Wife at bedside. BA on. Pt confused at times but easy to reorient, calm and cooperative.     "

## 2023-12-09 NOTE — PLAN OF CARE
"Goal Outcome Evaluation:      Plan of Care Reviewed With: patient    Overall Patient Progress: no change    Outcome Evaluation: Patient AXO 2. disoriented to time and situation. Left 2nd toe wound open to Air. Ortho will see patient in the morning      VS: BP (!) 147/69 (BP Location: Right arm, Patient Position: Semi-Lund's)   Pulse 68   Temp 97.7  F (36.5  C) (Oral)   Resp 18   Ht 1.702 m (5' 7.01\")   Wt 70.3 kg (155 lb)   SpO2 99%   BMI 24.27 kg/m      O2: Stable on room air>90%   Output: Incontinent with bladder. Brief in place   Last BM: 12/5 Per Patient report   Activity: Not OOB  Lift device   Assist of 2 with cares in bed   Skin: Left 2nd toe wound  Redness blanchable- bilat heels  Healed incisions to left heel  Skin tear- right forearm   Pain: Denies   CMS: Disoriented to time and situation    confused   Dressing: No dressing to the LLE   Diet: Reg diet   LDA: Left PIV-SL  Right PIV-SL   Plan: Ortho will see patient in the morning. MRI   IV antibiotics    Additional Info:       "

## 2023-12-10 NOTE — PLAN OF CARE
Occupational Therapy: Orders received. Chart reviewed and discussed with care team.? Occupational Therapy not indicated due to wife reports that pt has 4hrs/day of home care with total assist with all ADLs and is an Ax1 for transfers with a device similar to the Sera Steady. Wife requesting UE HEP. Discussed with PT and PT will give UE HEP. No other OT concerns at this time.? Defer discharge recommendations to PT and care team.? Will complete orders.

## 2023-12-10 NOTE — PROGRESS NOTES
General ID Service: Follow Up Note      Patient:  Noe Florence, Date of birth 1935, Medical record number [id number]  Date of Visit:  December 10, 2023         Assessment and Recommendations:   ID Problem List:    CKD  Atrial fibrillation  CAD s/p CABP  MGUS  No antibiotic allergies  Osteomyelitis of L 2nd toe  New fever  Worsening AMS  Covid-19 PCR positive  CXR with fluffy opacities bilaterally    Recommendations:    -agree with extended workup given new fevers and worsened clinical status  -agree with keeping vancomycin and zosyn for now  -start Remdesivir 200 mg IV today, 100 mg tomorrow and Tuesday  -I added a WBC differential, LDH, troponin and ferritin to the morning labs to look for severe Covid and further treatment needs  -recheck CRP in the morning  -will monitor for O2 needs    Discussion:    Patient with osteomyelitis of his L 2nd toe. He is well covered with vanc/zosyn. He now has Covid-19. Given his age and co morbidities he is at high risk of complications. I would have low threshold to move to a higher level of care although hemodynamically stable at the moment. I would start Remdesivir and monitor for further need for Covid-19 treatments.     Recs were discussed with primary team today. Don't hesitate to call with questions.     Attestation:  I have reviewed today's vital signs, medications, labs and imaging.  Olivia Tavarez MD  Pager [phone]          Interval History:       Patient with new fever today. He is now quite somnolent. I could not arouse him to touch. He is breathing comfortably on RA. Was not coughing in his room. Covid-19 positive.          Review of Systems:   Full 9 pt ROS obtained, pertinent positives and negatives as above.          Current Antimicrobials   Current:    Vancomycin and Zosyn         Physical Exam:   Ranges for vital signs:  Temp:  [97.8  F (36.6  C)-101.1  F (38.4  C)] 101.1  F (38.4  C)  Pulse:  [63-73] 66  Resp:  [16-17] 16  BP: ()/(40-54)  98/44  SpO2:  [95 %-99 %] 95 %  No intake or output data in the 24 hours ending 12/10/23 1148  Exam:  GENERAL:  elderly, very sleepy, mouth breathing  ENT:  Head is normocephalic, atraumatic. Oropharynx is dry without exudates or ulcers.  EYES:  Eyes have anicteric sclerae.    NECK:  Supple.  LUNGS:  Clear to auscultation anteriorly  CARDIOVASCULAR:  Regular rate and rhythm with no murmurs, gallops or rubs.  ABDOMEN:  Normal bowel sounds, soft, nontender.  EXT: Extremities warm and without edema.  SKIN:  No acute rashes.  Line is in place without any surrounding erythema.  NEUROLOGIC:  Grossly nonfocal.         Laboratory Data:   Reviewed.  Pertinent for:    Covid-19 PCR positive  INR 1.92  Cr 1.6  WBC 7.9    Culture data:    Blood cultures 12/8/23 ngtd x2  Blood cultures 12/10/23 ngtd  12/8/2023 MRSA nares negative         Imaging:      Exam: XR CHEST PORT 1 VIEW, 12/10/2023 11:52 AM     Indication: Fever, coarsened breath sounds; concern for pneumonia     Comparison: 9/18/2021 CT, 12/6/2023 CT     Findings:   Left chest wall ICD leads are intact and in stable position. Intact  median sternotomy wires with mediastinal surgical clips and mitral  valve prosthesis. Stable enlarged cardiac silhouette. The pulmonary  vasculature is indistinct. No appreciable pleural effusion or  pneumothorax. Perihilar and basilar prominent mixed interstitial and  streaky airspace opacities. Low lung volumes. Mild asymmetric  elevation of the right hemidiaphragm.                                                                      Impression: Enlarged cardiac silhouette with low lung volumes and  perihilar and basilar predominant mixed interstitial and streaky  airspace opacities, favored to represent a combination of pulmonary  edema and atelectasis, though infection is not excluded. Similar  findings are seen in the lung bases on 12/6/2023 CT.

## 2023-12-10 NOTE — PLAN OF CARE
"Goal Outcome Evaluation:                       VS: /54 (BP Location: Right arm, Patient Position: Semi-Lund's, Cuff Size: Adult Regular)   Pulse 73   Temp 97.8  F (36.6  C) (Oral)   Resp 16   Ht 1.702 m (5' 7.01\")   Wt 70.3 kg (155 lb)   SpO2 96%   BMI 24.27 kg/m      O2: Stable on room air>90%   Output: Incontinent with bladder. Brief in place   Last BM: 12/9    Activity: NWB LLE  Lift device   Assist of 2 with cares in bed   Skin: Left 2nd toe wound  Redness blanchable- bilat heels  Healed incisions to left knee  Skin tear- right forearm and left shin   Pain: Denies   CMS: Disoriented to time and situation    confused   Dressing: No dressing to the LLE   Diet: Reg diet   LDA: Right PIV-SL   Plan: WOC RN to see PT Monday.   IV antibiotics    Additional Info:       "

## 2023-12-10 NOTE — PLAN OF CARE
"Goal Outcome Evaluation:                 Outcome Evaluation: Covid + today, infectious work up done, RA saturating >90, IV fluid bolus given    VS: /60 (BP Location: Left arm)   Pulse 67   Temp 97.5  F (36.4  C) (Oral)   Resp 16   Ht 1.702 m (5' 7.01\")   Wt 70.3 kg (155 lb)   SpO2 96%   BMI 24.27 kg/m      No reports or signs of distress  Pt lethargic this shift, alert to voice at times and arousing briefly with repeated stimulation at times. Alert to self and place.   O2: RA. LS course. Infrequent cough, able to spit small sputum amount x1. Swabbed mouth to moisten throughout shift  Covid + this shift   Output: Incontinent bladder in brief, checking with repo q2h   Last BM: 12/9    Activity: Up with lift, Ax1-2 in bed.    Skin: Left 2nd toe wound  Left shin skin tear/scab  Redness blanchable to sacral area and bilateral heels- barrier cream applied and ROOKE boots on, KISHAN pump on bed  Bruising R forearm  WOC RN consult ordered   Pain: Grimacing with repo, unable to swallow tylenol this shift   CMS: Pulses 2+, extremities pale warm   Dressing: L shin gauze CDI    Diet: SLP consult unable to wake pt enough for intake. Pt lethargic and unable to eat/ drink. Cross cover notified that pt unable to take meds including warfarin   LDA: R PIV infusing medications and bolus 2000 ml this shift   Equipment: IV pole. ROOKE boots. KISHAN pump   Plan: ID consult today  WOC RN to see monday   Additional Info: Wife at bedside. BA on.       "

## 2023-12-10 NOTE — PHARMACY-VANCOMYCIN DOSING SERVICE
"Pharmacy Vancomycin Note  Date of Service December 10, 2023  Patient's  1935   88 year old, male    Indication: MRSA and Skin and Soft Tissue Infection  Day of Therapy: Since 23  Current vancomycin regimen:  750 mg IV q24h  Current vancomycin monitoring method: AUC  Current vancomycin therapeutic monitoring goal: 400-600 mg*h/L    Insight[employer] Prediction of Current Vancomycin Regimen  Regimen: 750 mg IV every 24 hours.  Start time: 11:12 on 12/10/2023  Exposure target: AUC24 (range)400-600 mg/L.hr   AUC24,ss: 467 mg/L.hr  Probability of AUC24 > 400: 78 %  Ctrough,ss: 15.6 mg/L  Probability of Ctrough,ss > 20: 17 %  Probability of nephrotoxicity (Lodise JUNIOR ): 11 %    Current estimated CrCl = Estimated Creatinine Clearance: 31.7 mL/min (A) (based on SCr of 1.6 mg/dL (H)).    Creatinine for last 3 days  2023:  1:11 PM Creatinine 1.56 mg/dL  2023:  7:38 AM Creatinine 1.31 mg/dL  12/10/2023:  8:02 AM Creatinine 1.60 mg/dL    Recent Vancomycin Levels (past 3 days)  12/10/2023:  8:02 AM Vancomycin 12.9 ug/mL    Vancomycin IV Administrations (past 72 hours)                     vancomycin (VANCOCIN) 750 mg in sodium chloride 0.9 % 250 mL intermittent infusion (mg) 750 mg New Bag 23 1228    vancomycin (VANCOCIN) 1,500 mg in 0.9% NaCl 250 mL intermittent infusion (mg) 1,500 mg New Bag 23                    Nephrotoxins and other renal medications (From now, onward)      Start     Dose/Rate Route Frequency Ordered Stop    23 1600  piperacillin-tazobactam (ZOSYN) 2.25 g vial to attach to  ml bag        Note to Pharmacy: For [facility], [facility] and [facility]: For Zosyn-naive patients, use the \"Zosyn initial dose + extended infusion\" order panel. Dose adjusted per renal dosing policy.  Estimated CrCl = 20-40.    2.25 g  over 30 Minutes Intravenous EVERY 6 HOURS 23 1547      23 1200  vancomycin (VANCOCIN) 750 mg in sodium chloride 0.9 % 250 mL intermittent infusion         750 " mg  over 90 Minutes Intravenous EVERY 24 HOURS 12/08/23 2020                 Contrast Orders - past 72 hours (72h ago, onward)      None            Interpretation of levels and current regimen:  Vancomycin level is reflective of -600    Has serum creatinine changed greater than 50% in last 72 hours: No    Urine output:  unable to determine    Renal Function: Worsening    Plan:  Continue Current Dose  Vancomycin monitoring method: AUC  Vancomycin therapeutic monitoring goal: 400-600 mg*h/L  Pharmacy will check vancomycin levels as appropriate in 1-3 Days.  Serum creatinine levels will be ordered a minimum of twice weekly.    Trinity Fajardo RPH

## 2023-12-10 NOTE — PROGRESS NOTES
Per chart review: no home care indicated at this time. RNCC available as needed.    Sudha Brown RN, BSN  Care Coordinator, 5 Ortho  Phone [phone]  Pager [phone]

## 2023-12-10 NOTE — PROGRESS NOTES
[facility]    Medicine Progress Note - Hospitalist Service, GOLD TEAM 17    Date of Admission:  12/8/2023    Addendum:  - Covid-19 was positive.  - Discussed with Infectious Disease. Patient to be on a 3 day course of remdesivir.  - Patient to be on isolation precautions.  - Monitoring respiratory status. Monitoring for superimposed bacterial infection.      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 Patient presented on 08-Dec-2023 with swollen feet and wound infection. Patient reportedly had completed a course of antibiotic therapy but wounds had carmen worsening despite antibiotics.     Imaging is suggestive of left foot 2nd toe osteomyelitis.      From chart review, patient had been seen by Vascular Surgery on 15-Nov-2023. Patient was deemed to have nonsignificant peripheral vascular disease and great healing potential. It was felt that there was no role for further vascular imaging or intervention to improve healing potential. Patient was felt to be a high surgical risk for repair of left common iliac aneurysm which may be reaching a size of 3.5 cm.     Per family, patient was more confused than usual yesterday. Patient minimally responsive today. This could represent metabolic encephalopathy. Patient was febrile this morning. Patient might have early sepsis.     P:  1.) Left foot 2nd toe wound with concern for osteomyelitis:  - Discussed with Orthopaedic Surgery. No benefit to obtaining MRI at this time. No indication for surgical intervention. Wound care with patient to f/u  with Podiatry as outpatient.  - Consulted Infectious Disease for antibiotic recommendations.  - Plan had been for patient to transition from vancomycin to zosyn. Zosyn has already been started but plan now for patient to remain on both zosyn and vancomycin in light of new fever.    2.) Fever, possible early sepsis:  - Will bolus 2 litres of normal saline.  - Will repeat blood cultures, obtain urinalysis and obtain CXR. Ordering Covid-19 and influenza assays.  - Discussed with Infectious Disease. Patient to remain on vancomycin and zosyn for now.    3.) Confusion, potentially metabolic encephalopathy:  - Monitoring for safety.     4.) Atrial fibrillation - patient on long-term anticoagulation with coumadin:  - Patient continuing coumadin for anticoagulation. INR subtherapeutic today with goal INR 2-3.  - Patient continuing metoprolol.     5.) Chronic kidney disease stage III, unclear if stage IIIa or stage IIIb:  - Baseline creatinine appears to be 1.4-1.8.  - Avoiding nephrotoxins.     6.) Polyneuropathy:  - Patient to f/u with Neurology as outpatient.     7.) Depression, anxiety:  - Patient on remeron, risperidal and zoloft as outpatient and these have been continued.     8.) Osteoporosis:  - Patient usually on fosamax.  - Further monitoring and management as outpatient.     9.) Monoclonal gammopathy of unclear significance:  - Further monitoring as outpatient.  - Per outpatient notes, patient is no longer seeing Oncology for this problem.     10.) Left common iliac artery aneurysm:  - Patient to f/u with Vascular Surgery as outpatient.  - Per outpatient Vascular Surgery note, possible repair if aneurysm is over 4 cm.     11.) History of colon cancer:  - Further surveillance as outpatient.     12.) History of DVT:  - Patient already on coumadin for anticoagulation.     13.) Polymorphic ventricular tachycardia s/p ICD placement in 2012:  - Further monitoring as outpatient.    14.) History of hypertension; blood  pressure low today:  - Patient on metoprolol. Adding hold parameters.      Diet: Regular Diet Adult    Dominguez Catheter: Not present  Lines: None     Cardiac Monitoring: None  Code Status: Full Code      Clinically Significant Risk Factors                  # Hypertension: Noted on problem list             # ICD device present       Disposition Plan     Expected Discharge Date: 12/10/2023                    Benji Solis MD  Hospitalist Service, GOLD TEAM 17  [facility]  Securely message with [employer] (more info)  Text page via [employer] Paging/Directory   See signed in provider for up to date coverage information  ______________________________________________________________________    Interval History   Patient was not answering questions.    Per family, patient was more confused yesterday than at usual baseline.    Physical Exam   Vital Signs: Temp: (!) 101.1  F (38.4  C) Temp src: Axillary BP: 98/44 Pulse: 66   Resp: 16 SpO2: 95 % O2 Device: None (Room air)    Weight: 155 lbs 0 oz    General: Patient comfortable, NAD. Patient would move head slightly in response to touch but did not respond to voice.  Heart: RRR, S1 S2 w/o murmurs.  Lungs: Breath sounds present, coarsened diffusely.  Abdomen: Soft.    Labs noted.  Sodium 140; Potassium 4.2; Creatinine 1.60; CRP 43.15  WBC 7.9; Hb 8.4; Platelets 145  INR 1.92    Medical Decision Making

## 2023-12-10 NOTE — PROGRESS NOTES
Care Management Follow Up    Length of Stay (days): 2    Expected Discharge Date: 12/10/2023     Concerns to be Addressed:     yes  Patient plan of care discussed at interdisciplinary rounds: Yes    Anticipated Discharge Disposition:  Home with home care, PCA's     Anticipated Discharge Services:  skilled nursing and physical thearpy  Anticipated Discharge DME:  Lupillo lift    Patient/family educated on Medicare website which has current facility and service quality ratings:  NA  Education Provided on the Discharge Plan:  NA  Patient/Family in Agreement with the Plan:  NA    Referrals Placed by CM/SW:  NA  Private pay costs discussed: Not applicable    Additional Information:  Plan for patient to discharge to home once completed Covid isolation. Patient has PCA's in his home and they will not return to work until Covid precautions are completed.   Patient is currently open to [facility] for PT and skilled nursing. Will resume home care when closer to home discharge.   Patient has declined TCU. Patient will need a lupillo lift enable to discharge to home. Wife, states the standard lupillo lift is too large for their home. Will check with Home Medical equipment if a small lift is available. RNCC available as needed.    Sudha Brown RN, BSN  Care Coordinator, 5 Ortho  Phone [phone]  Pager [phone]

## 2023-12-11 NOTE — PLAN OF CARE
"VS: /48 (BP Location: Left arm, Patient Position: Semi-Lund's)   Pulse 72   Temp 98.1  F (36.7  C) (Oral)   Resp 16   Ht 1.702 m (5' 7.01\")   Wt 70.3 kg (155 lb)   SpO2 100%   BMI 24.27 kg/m      O2: O2 SATS >90% denies chest pain,  or SBO, infrequent productive coughing ,   Output: Incontinent of bowel and bladder , check and change with repositioning    Last BM: 12/9/23  BS present all x4 quadrants    Activity: A-2 with Lift device Not oob this shift    Up for meals? N/A    Skin: Left 2nd toe wound.skin tear/scab to L shin  blanchable redness  to  sacrum mepilex in place and bilateral heels. ROOKE boots  to BLE ,Bruising R  and L forearm   Pain: Says yes to pain but unable  to rate , Tylenol administered with relief    CMS: Disoriented to time and situation intermittent confusion    Dressing: Dressing Clean dry and intact   Diet: Regular diet    LDA: R PIV SL    Equipment: IV Pole, personal belongings , Rooke boots to BLE    Plan: Continue to monitor  and update,    Additional Info:                              "

## 2023-12-11 NOTE — PROGRESS NOTES
Speech-Language Pathology: Clinical Swallow Evaluation     12/11/23 1400   Appointment Info   Signing Clinician's Name / Credentials (SLP) RAÚL Garcia   General Information   Pertinent History of Current Problem Per Chest XR there was a question of possible pna. Bedside Swallow performed to rule out oral dysphagia or sx's aspiration. 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 Admitted with R 2nd toe wound and c/f osteomyelitis.   General Observations pleasant and cooperative. Wife present.   Type of Evaluation   Type of Evaluation Swallow Evaluation   Oral Motor   Oral Musculature generally intact   Dentition (Oral Motor)   Dentition (Oral Motor) adequate dentition   Facial Symmetry (Oral Motor)   Facial Symmetry (Oral Motor) WNL   Lip Function (Oral Motor)   Lip Range of Motion (Oral Motor) WNL   Lip Strength (Oral Motor) WNL   Lip Coordination (Oral Motor)   (WNL)   Tongue Function (Oral Motor)   Tongue Strength (Oral Motor) WNL   Tongue Coordination/Speed (Oral Motor) WNL   Tongue ROM (Oral Motor) WNL   Jaw Function (Oral Motor)   Jaw Function (Oral Motor) WNL   Cough/Swallow/Gag Reflex (Oral Motor)   Soft Palate/Velum (Oral Motor) WNL   Volitional Throat Clear/Cough (Oral Motor) WNL   Vocal Quality/Secretion Management (Oral Motor)   Vocal Quality (Oral Motor) WNL   General Swallowing Observations   Past History of Dysphagia none noted or reported   Current Diet/Method of Nutritional Intake (General Swallowing Observations, NIS) thin liquids (level 0);regular diet   Swallowing Evaluation Clinical swallow evaluation   Clinical Swallow Evaluation   Feeding Assistance minimal assistance required   Clinical Swallow  Evaluation Textures Trialed thin liquids;solid foods   Clinical Swallow Eval: Thin Liquid Texture Trial   Mode of Presentation, Thin Liquids straw   Volume of Liquid or Food Presented 3 oz   Oral Phase of Swallow WFL   Pharyngeal Phase of Swallow intact   Clinical Swallow Evaluation: Solid Food Texture Trial   Mode of Presentation self-fed   Volume Presented whole wade cracker   Oral Phase WFL   Pharyngeal Phase intact   Swallowing Recommendations   Diet Consistency Recommendations thin liquids (level 0);regular diet   Comment, Swallowing Recommendations recommend regular diet wtih thin liquids   Clinical Impression   Criteria for Skilled Therapeutic Interventions Met (SLP Eval) Evaluation only;No problems identified which require skilled intervention   Clinical Impression Comments No oral dysphagia or overt sx's aspiriton noted with straw drinking thin liquids and masticating/swallowing regular textured wade cracker. Pt appears safe to resume regular diet with thin liquids.   SLP Total Evaluation Time   Eval: oral/pharyngeal swallow function, clinical swallow Minutes (60571) 15   Total Session Time   Total Session Time (sum of timed and untimed services) 15

## 2023-12-11 NOTE — PROGRESS NOTES
General ID Service: Follow Up Note Blue team      Patient:  Noe Florence, Date of birth 1935, Medical record number [id number]  Date of Visit:  December 11, 2023         Assessment and Recommendations:   ID Problem List:    CKD  Atrial fibrillation  CAD s/p CABP  MGUS  No antibiotic allergies  Osteomyelitis of L 2nd toe  Covid-19 PCR positive, not hypoxic  CXR with fluffy opacities bilaterally    Recommendations:  -agree with keeping vancomycin and zosyn for now, expect changing to oral empiric therapy as soon as tomorrow 12/12 (tentatively amox/clav and doxycycline). I anticipate a prolonged 6-8 week course  -continue Remdesivir, tentatively for a 3 -day course (last dose Tues 12/12)  -will monitor for O2 needs, which should prompt consideration of additional COVID therapeutics    Discussion:    His toe infection did not improve with outpatient amoxicillin; seems to have improved with broad spectrum vanc + pip/tazo. He had a prior culture in 202 that showed MRSA; even though his nares here is negative for SA, I would presumed MRSA involvement in addition to possibly polymicrobial aerobes and anaerobes. I don't have a strong suspicion for Pseudomonas.    ID will follow. Total floor time =50 min    Candis Vargas MD   of Medicine, Division of Infectious Diseases  Contact me on the [employer] allie or console  pgr [phone]        Interval History:     Seems comfortable. No O2 req. Biggest complain is anxiety when I spoke with him.         Current Antimicrobials   Current:    Vancomycin and Zosyn         Physical Exam:   Ranges for vital signs:  Temp:  [97.6  F (36.4  C)-98.1  F (36.7  C)] 97.7  F (36.5  C)  Pulse:  [60-81] 63  Resp:  [16-18] 18  BP: (108-134)/(48-59) 130/59  SpO2:  [96 %-100 %] 97 %  No intake or output data in the 24 hours ending 12/10/23 1148  Exam:  GENERAL:  elderly, very sleepy, mouth breathing  ENT:  Head is normocephalic, atraumatic. Oropharynx is dry without  exudates or ulcers.  EYES:  Eyes have anicteric sclerae.    NECK:  Supple.  LUNGS:  Clear to auscultation anteriorly  CARDIOVASCULAR:  Regular rate and rhythm with no murmurs, gallops or rubs.  ABDOMEN:  Normal bowel sounds, soft, nontender.  EXT: Extremities warm and without edema.  SKIN:  No acute rashes.  Line is in place without any surrounding erythema.  NEUROLOGIC:  Grossly nonfocal.         Laboratory Data:   Reviewed.  Pertinent for:    Covid-19 PCR positive  INR 1.92  Cr 1.6  WBC 7.9    Culture data:    Blood cultures 12/8/23 ngtd x2  Blood cultures 12/10/23 ngtd  12/8/2023 MRSA nares negative         Imaging:      Exam: XR CHEST PORT 1 VIEW, 12/10/2023 11:52 AM     Indication: Fever, coarsened breath sounds; concern for pneumonia     Comparison: 9/18/2021 CT, 12/6/2023 CT     Findings:   Left chest wall ICD leads are intact and in stable position. Intact  median sternotomy wires with mediastinal surgical clips and mitral  valve prosthesis. Stable enlarged cardiac silhouette. The pulmonary  vasculature is indistinct. No appreciable pleural effusion or  pneumothorax. Perihilar and basilar prominent mixed interstitial and  streaky airspace opacities. Low lung volumes. Mild asymmetric  elevation of the right hemidiaphragm.                                                                      Impression: Enlarged cardiac silhouette with low lung volumes and  perihilar and basilar predominant mixed interstitial and streaky  airspace opacities, favored to represent a combination of pulmonary  edema and atelectasis, though infection is not excluded. Similar  findings are seen in the lung bases on 12/6/2023 CT.

## 2023-12-11 NOTE — PLAN OF CARE
PT: pt will require use of OH Lift for mobility at home. At baseline pt utilizes a patricio stedy-like device and Ax2 for transfers. Now, that NWB through L LE is will be unable to stand safely. Therefore recommend OH lift device for home. Pt is not TCU appropriate as standing prognosis extremely poor with NWB L LE in combination with baseline significant mobility impairments. Pt would be best served returning home with his baseline hired help Ax2 with a Lupillo lift. He will unable to stand with Ax2 and maintain NWB L LE. Spoke to wife about this plan, she is in agreement. Planning for wife and at least one of their helpers to come to hospital for caregiver training tomorrow at 1:30pm as of now. RNCC updated on equipment needs. Also recommend  PT for home safety set up.

## 2023-12-11 NOTE — PLAN OF CARE
Goal Outcome Evaluation:      Plan of Care Reviewed With: significant other    Overall Patient Progress: no changeOverall Patient Progress: no change         Barbara Reyes, RN    Nurse Coordinator     Covering for: 5 Ortho Sudha      Phone: *[phone]    Social Work and Care Management Department    SEARCHABLE in [facility] - search CARE COORDINATOR    [place] ([phone]) Saturday & Sunday; ([phone]) [facility] Recognized Holidays    Units: 5A, 5B & 5C  Pager: [phone]    Units: 6B, 6C & 6D    Pager: [phone]    Units: 7A, 7B, 7C & 7D    Pager: [phone]    Units: 6A & ICU   Pager: [phone]    Units: 5 Ortho, 5MS & WB ED Pager: [phone]    Units: 6MS, 8A & 10 ICU  Pager [phone]

## 2023-12-11 NOTE — PROGRESS NOTES
[facility]    Medicine Progress Note - Hospitalist Service, GOLD TEAM 17    Date of Admission:  12/8/2023    Assessment & Plan   A: Patient is an 89 y/o man who has multiple medical problems including chronic kidney disease, atrial fibrillation, coronary artery disease with past CABG and past stent placement, past DVT, polymorphic ventricular tachycardia s/p ICD placement in May-2012, monoclonal gammopathy of unknown significance, polyneuropathy, ischemic cardiomyopathy, left common iliac aneurysm, polymyalgia rheumatica and past colon cancer. Patient presented on 08-Dec-2023 with swollen feet and wound infection. Patient reportedly had completed a course of antibiotic therapy but wounds had carmen worsening despite antibiotics.     Imaging is suggestive of left foot 2nd toe osteomyelitis.      From chart review, patient had been seen by Vascular Surgery on 15-Nov-2023. Patient was deemed to have nonsignificant peripheral vascular disease and great healing potential. It was felt that there was no role for further vascular imaging or intervention to improve healing potential. Patient was felt to be a high surgical risk for repair of left common iliac aneurysm which may be reaching a size of 3.5 cm.     Patient was confused on 09-Dec-2023 and was minimally responsive on 10-Dec-2023 but is more responsive today. This likely is from a metabolic encephalopathy. Patient was febrile yesterday morning and might have had early sepsis. Patient subsequently was found to be positive for Covid-19.    P:  1.) Left foot 2nd toe wound with concern for osteomyelitis:  - Discussed with Orthopaedic Surgery. No benefit to obtaining MRI at this time. No indication for surgical intervention. Wound care with patient to f/u with Podiatry as outpatient.  - Consulted Infectious Disease for antibiotic recommendations.  - Plan had been for patient to transition from vancomycin to zosyn. Zosyn has  already been started but plan vancomycin was continued in light of fever.  - Per Infectious Disease, possible transition to oral empiric antibiotics tomorrow.    2.) Fever, possible early sepsis; patient afebrile today:  - Supportive care.  - Awaiting results of repeat blood cultures.    3.) Covid-19 infection:  - Discussed with Infectious Disease. Given risk of progression to severe disease, patient is to complete a 3 day course of remdesivir.  - Patient on day 1 of isolation precautions.    4.) Confusion, potentially metabolic encephalopathy; improved today:  - Monitoring for safety.    5.) Atrial fibrillation - patient on long-term anticoagulation with coumadin:  - Patient continuing coumadin for anticoagulation. INR therapeutic today with goal INR 2-3.  - Patient continuing metoprolol.     6.) Chronic kidney disease stage III, unclear if stage IIIa or stage IIIb:  - Baseline creatinine appears to be 1.4-1.8.  - Avoiding nephrotoxins.  - Monitoring labs.     7.) Polyneuropathy:  - Patient to f/u with Neurology as outpatient.     8.) Depression, anxiety:  - Patient on remeron, risperidal and zoloft as outpatient and these have been continued.     9.) Osteoporosis:  - Patient usually on fosamax.  - Further monitoring and management as outpatient.     10.) Monoclonal gammopathy of unclear significance:  - Further monitoring as outpatient.  - Per outpatient notes, patient is no longer seeing Oncology for this problem.     11.) Left common iliac artery aneurysm:  - Patient to f/u with Vascular Surgery as outpatient.  - Per outpatient Vascular Surgery note, possible repair if aneurysm is over 4 cm.     12.) History of colon cancer:  - Further surveillance as outpatient.     13.) History of DVT:  - Patient already on coumadin for anticoagulation and INR therapeutic today.     14.) Polymorphic ventricular tachycardia s/p ICD placement in 2012:  - Further monitoring as outpatient.     15.) History of hypertension; blood  pressure low yesterday but patient normotensive today:  - Patient on metoprolol with hold parameters.          Diet: Regular Diet Adult    Dominguez Catheter: Not present  Lines: None     Cardiac Monitoring: None  Code Status: Full Code      Clinically Significant Risk Factors          # Hypocalcemia: Lowest Ca = 8.3 mg/dL in last 2 days, will monitor and replace as appropriate         # Hypertension: Noted on problem list             # ICD device present         Benji Solis MD  Hospitalist Service, GOLD TEAM 17  [facility]  Securely message with [employer] (more info)  Text page via [employer] Paging/Directory   See signed in provider for up to date coverage information  ______________________________________________________________________    Interval History   Patient noted some dyspnea and cough today.     Physical Exam   Vital Signs: Temp: 97.6  F (36.4  C) Temp src: Oral BP: 127/56 Pulse: 81   Resp: 16 SpO2: 100 % O2 Device: None (Room air)    Weight: 155 lbs 0 oz    General: Patient comfortable, NAD. Patient awake. Patient hard of hearing but will answer some questions appropriately.  Heart: RRR, S1 S2 w/o murmurs.  Lungs: Breath sounds present. No crackles/wheezes heard.  Abdomen: Soft, nontender    Labs noted.  Sodium 142; Potassium 3.7; Creatinine1.55; .29;  WBC 4.7; Hb 7.8; Platelets 134; INR 2.06;    Abdominal x-ray: Nonobstructive bowel gas pattern     CXR:   1.  Unchanged perihilar and bibasilar streaky/patchy opacities.  2.  No pneumothorax.    Medical Decision Making

## 2023-12-11 NOTE — PROGRESS NOTES
"  VS: /56 (BP Location: Left arm, Patient Position: Semi-Lund's, Cuff Size: Adult Regular)   Pulse 81   Temp 97.6  F (36.4  C)   Resp 16   Ht 1.702 m (5' 7.01\")   Wt 70.3 kg (155 lb)   SpO2 100%   BMI 24.27 kg/m      O2: Room air saturations. Only able to complete IS to level of 1000. Needed a lot of coaching verbally to complete using IS   Output: Pt has a brief on at all time. Pt has been incontinent of bowel and bladder this am.    Last BM: Incontinent of bowel of large soft BM this am.    Activity: Encouraged patient to roll side to side and have HOB up when eating. Will try to get patient up in chair later in shift. Pt needed a lot coaching with po intake but had a very good PO intake with encouragement this am. Crushing med's and mixing them in with applesauce to take med's. Pt sat up in recliner for 2 hours in am mid afternoon. When patient got back into bed pt stated\" I am having this pain right under my right rib cage\" Called and notified Dr Solis to make him aware of patients complaint   Skin: Back of bottom is slightly reddened. Applied lotion and massage with hygiene cares this am. Has some scabbed areas on shins and arm. Also has a sore on left lower leg. WOC has been consulted to make recommendations. Currently is covered with Kerlix and ABD. Completed very good hygiene this am.    Pain: Pt is receiving Tylenol for pain scheduled.    CMS:    Dressing: Left leg dressing is CDI   Diet: Regular. Crushing med's and giving with applesauce   LDA: IV SL   Equipment: IS, Isolation for Covid, specialty bed, bed alarm, needs help with eating. Needs all med's crushed. Chair alarm, lift room for getting patient up and down into chair,    Plan: Pt normally lives at home with wife. Pt normally gets services into his home.    Additional Info: Pt is Covid positive 12/10/2023 was first day of Diagnosis. Pt is able to be helpful with turning side to side. Needs a lot of reminding but is pleasant with " cares. Status of patient will continue to be monitored. Awaiting for Xray to complete portable abdominal and lower chest xray. Patients wife at bedside. Awaiting for lunch to now be delivered. Pt had a very good appetite however requires a lot of help with eating and setting up.

## 2023-12-11 NOTE — CONSULTS
Care Management Initial Consult    General Information  Assessment completed with: Spouse or significant other, Rica (significant other)  Type of CM/SW Visit: Initial Assessment    Primary Care Provider verified and updated as needed: Yes   Readmission within the last 30 days: no previous admission in last 30 days      Reason for Consult: discharge planning  Advance Care Planning: Advance Care Planning Reviewed: other (see comments)  Rica states she will bring in updated HCD when she comes to the hospital next.  General Information Comments: Patient unable to communicate clearly, so assessment done with significant other.    Communication Assessment  Patient's communication style: spoken language (English or Bilingual)             Cognitive  Cognitive/Neuro/Behavioral: .WDL except, level of consciousness, orientation  Level of Consciousness: alert, intermittent confusion  Arousal Level: opens eyes spontaneously  Orientation: disoriented to, situation, time, place  Mood/Behavior: calm  Best Language: 0 - No aphasia  Speech: clear    Living Environment:   People in home: significant other  Rica, (significant other)  Current living Arrangements: house      Able to return to prior arrangements: yes  Living Arrangement Comments: Patient lives with Rica (significant other) states house is set up for patient to return home    Family/Social Support:  Care provided by: spouse/significant other, other (see comments) (PCA/no agency just individuals)  Provides care for: no one, unable/limited ability to care for self  Marital Status: Lives with Significant Other  Significant Other, Children, Other (specify) (Son- See Florence)       Rica  Description of Support System: Supportive, Involved    Support Assessment: Adequate family and caregiver support    Current Resources:   Patient receiving home care services: No     Community Resources: PCA  Equipment currently used at home: wheelchair, manual (standing  transfer device like a SaraStedy)  Supplies currently used at home: Wound Care Supplies, Other (creams (supplied by drug store))    Employment/Financial:  Employment Status: retired     Employment/ Comments: Patient is retired  Financial Concerns: other (see comments) (significant other has concern for paying for care givers)   Referral to Financial Worker: No       Does the patient's insurance plan have a 3 day qualifying hospital stay waiver?  No    Lifestyle & Psychosocial Needs:  Social Determinants of Health     Food Insecurity: Not on file   Depression: Not at risk (4/21/2023)    PHQ-2     PHQ-2 Score: 0   Housing Stability: Not on file   Tobacco Use: Medium Risk (10/13/2023)    Patient History     Smoking Tobacco Use: Former     Smokeless Tobacco Use: Never     Passive Exposure: Not on file   Financial Resource Strain: Not on file   Alcohol Use: Not on file   Transportation Needs: Not on file   Physical Activity: Not on file   Interpersonal Safety: Not on file   Stress: Not on file   Social Connections: Not on file       Functional Status:  Prior to admission patient needed assistance:   Dependent ADLs:: Bathing, Dressing, Grooming, Positioning, Transfers (Uses a Dorothy Study to transfer to chair)  Dependent IADLs:: Cleaning, Cooking, Laundry, Shopping, Meal Preparation, Medication Management, Money Management, Transportation, Incontinence       Mental Health Status:  Mental Health Status: Other (see comment) (Anxiety and Depression)  Mental Health Management: Medication    Chemical Dependency Status:  Chemical Dependency Status: No Current Concerns             Values/Beliefs:  Spiritual, Cultural Beliefs, Scientologist Practices, Values that affect care: no          Values/Beliefs Comment: Rica states patient is Worship and can no longer make it to [employer].  He does sometimes watch it on TV.    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 Admitted with R 2nd toe wound and c/f osteomyelitis.     Patient unable to communicate assessment with RNCC.  RNCC called Rica (LAVONNE) to introduce RNCC role and complete CMA.  Rica states that patient lives with her in a home.  Rica states that the home is set up well for patient with a standing transfer lift that is similar to a Nicci Stedy.  Patient has a shower chair, commode, and a manual wheelchair. Rica states she supports patient by doing all the cleaning, cooking, transportation, and medication set up.  Rica makes sure he has any supplies that he needs, like creams and bandages that she gets from the drug store.  Rica states that she has a PCA come in both morning and night. PCA helps with dressing, bathing, setting up meals, and medications.  Rica states that she will call in the PCA during the day if patient has to go to the bathroom.  Rica states that patient son is also good support for him and comes to the house to give patient a shower once a week. Rica reports she has used Home Care in the past and like it.  She is open to sending referrals to Home Care for discharge.      Rica states that patient prior to hospitalization was intermittently confused.  Rica reports patient suffers from anxiety and depression and is on medication.  Rica states patient has no chemical dependency concerns.  Rica states some financial concern as \"caregivers are expensive.\"  Rica states patient is retired and receives social security.  Rica states patient was raised Rastafari and is unable to attend Congreg[employer] anymore but does watch it on TV sometimes.      RNCC send Referral to [facility].  RNCC to follow up with Rica on " referrals sent to home care.  RNCC spoke to Ivy at [facility] and sent face sheet, PT note, shital lift order via fax for shital lift needed at discharge.     [facility]  P- [phone]  A-[phone]     Barbara Reyes, RN    Nurse Coordinator     Covering for: 5 Ortho    Phone: *[phone]    Social Work and Care Management Department    SEARCHABLE in [facility] - search CARE COORDINATOR    [employer] [place] ([id number]) Saturday & Sunday; (0800-1630) [facility] Recognized Holidays    Units: 5A, 5B & 5C  Pager: [phone]    Units: 6B, 6C & 6D    Pager: [phone]    Units: 7A, 7B, 7C & 7D    Pager: [phone]    Units: 6A & ICU   Pager: [phone]    Units: 5 Ortho, 5MS & WB ED Pager: [phone]    Units: 6MS, 8A & 10 ICU  Pager [phone]

## 2023-12-11 NOTE — CONSULTS
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 Admitted with R 2nd toe wound and c/f osteomyelitis.      Infected L 2nd toe wound with surrounding cellulitis, and concern for osteomyelitis   Hx of neuropathy 2/2 MGUS   Long history of lower extremity wounds. Evaluated 11/15 in Vascular clinic, CTA imaging w/o stenosis, and ABIs adequate thus not felt to have significant PAD causing wounds. 1mo history of poor healing wound R second toe, with extension into webspace between 1st and 2nd toes. No DM history, though A1c checked years ago. Following in podiatry clinic, 2 week history of progression and surrounding cellulitis. XR in ED w/ ? Of osteomyelitis. Based on work up thus far suspect wound is secondary to neuropathy and pressure ulcer. Requires further IV abx, and imaging to rule out underlying osteomyelitis.   - Cont IV Vancomycin   - Follow blood cultures   - Obtain MRSA swab   - Podiatry consult  - Attempted to obtain MRI, unable to get tonight because needs ICD clearance first (though per review suspect ICD should be compatible). Per d/w MRI desk may not be able to get until early next week. Will obtain CT scan of L foot tonight  - Wound care consult   - For pain: PTA tylenol 1000mg BID   - Check A1C     Assessment:      Areas visualized during today's visit:  Bilateral toes    Wound location: bilateral toes     12/11: left foot toes       12/11: right foot toes    Last photo: 12/11/23  Wound due to: Pressure  "Injury and Diabetic Ulcer  Wound history/plan of care: See HPI  Wound base: Left foot 10/90 % dermis and superficial scab, right foot 100 % superficial scab     Palpation of the wound bed: normal      Drainage: small     Description of drainage: serosanguinous- left foot only     Measurements (length x width x depth, in cm): multiple toes- see photo     Tunneling: N/A     Undermining: N/A  Periwound skin: Intact      Color: normal and consistent with surrounding tissue      Temperature: normal   Odor: none  Pain: denies , none  Pain interventions prior to dressing change: patient tolerated well and slow and gentle cares   Treatment goal: Heal , Infection control/prevention, Maintain (prevention of deterioration), and Protection  STATUS: initial assessment  Supplies ordered: ordered xeroform, discussed with RN, and discussed with patient and spouse       Treatment Plan:     Left 1-3rd toe wound(s): Daily and PRN when soiled change ABD pad  Spray with wound cleanser and gently pat dry  Cut a 1\" strip of Xeroform ([id number]) and wind around affected toes  Cover with ABD pad folded over toes   Secure with 1/2 kerlix roll and tape (no tape directly on skin)     Orders: Written    RECOMMEND PRIMARY TEAM ORDER: None, at this time  Education provided: plan of care and wound progress  Discussed plan of care with: Patient, Family, and Nurse  WOC nurse follow-up plan: weekly  Notify WOC if wound(s) deteriorate.  Nursing to notify the Provider(s) and re-consult the WOC Nurse if new skin concern.    DATA:     Current support surface: Standard  Standard gel/foam mattress (IsoFlex, Atmos air, etc)  Containment of urine/stool: Incontinent pad in bed  BMI: Body mass index is 24.27 kg/m .   Active diet order: Orders Placed This Encounter      Regular Diet Adult     Output: No intake/output data recorded.     Labs:   Recent Labs   Lab 12/11/23  0654 12/10/23  1213 12/10/23  0802 12/08/23 2039 12/08/23  1311   ALBUMIN  --   --  3.5  --  " 3.5   HGB 7.8*  --  8.4*   < > 8.3*   INR 2.06*   < > 1.92*   < > 1.88*   WBC 4.7  --  7.9   < > 6.7   A1C  --   --   --   --  6.0*    < > = values in this interval not displayed.     Pressure injury risk assessment:   Sensory Perception: 3-->slightly limited  Moisture: 3-->occasionally moist  Activity: 1-->bedfast (lift device)  Mobility: 2-->very limited  Nutrition: 2-->probably inadequate  Friction and Shear: 2-->potential problem  Chandler Score: 13    Sudha Hoang RN CWOCN  Pager no longer is use, please contact through [employer]   Vocera group: [facility] Nurse [facility]  Dept. Office Number: [phone]

## 2023-12-12 NOTE — PLAN OF CARE
VS: Temp: 97.7  F (36.5  C) Temp src: Oral BP: 134/56 Pulse: 69   Resp: 18 SpO2: 97 % O2 Device: None (Room air)       O2: SpO2>90% on room air, denies shortness of breath and chest pain   Output: Incontinent of urine, wearing brief. Sometimes able to use urinal with assistance   Last BM: 12/11/2023   Activity: Turns in bed with assist of 2, lift for transfers   Skin: L 2nd toe wound, some scabbing on bilateral shins and R arm. Blanchable redness on sacrum, scabbing on R toes   Pain: Managed with scheduled tylenol    CMS: Disoriented to time, situation. Denies N/T   Dressing: L foot dressing CDI   Diet: Regular, tolerating well, denies nausea. Meds crushed with applesauce   LDA: R forearm PIV infusing   Equipment: IV pole, personal belongings   Plan: Discharge TBD, continue to monitor. Pt lives at home with wife and receives home care services prior to hospitalization   Additional Info:

## 2023-12-12 NOTE — CONSULTS
Consult completed 12.11.23 - see note from care coordinator for details.    TERESA Ordoñez BSW  Float   Covering 5ortho - [phone]

## 2023-12-12 NOTE — PHARMACY-VANCOMYCIN DOSING SERVICE
"Pharmacy Vancomycin Note  Date of Service 2023  Patient's  1935   88 year old, male    Indication: Osteomyelitis  Day of Therapy: since 23  Current vancomycin regimen:  750 mg IV q24h (10.7mg/kg/dose)  Current vancomycin monitoring method: AUC  Current vancomycin therapeutic monitoring goal: 400-600 mg*h/L    InsightRX Prediction of Current Vancomycin Regimen  Loading dose: N/A  Regimen: 750 mg IV every 24 hours.  Exposure target: AUC24 (range)400-600 mg/L.hr   AUC24,ss: 384 mg/L.hr  Probability of AUC24 > 400: 41 %  Ctrough,ss: 12.1 mg/L  Probability of Ctrough,ss > 20: 2 %  Probability of nephrotoxicity (Lodise JUNIOR ): 7 %    Current estimated CrCl = Estimated Creatinine Clearance: 36.3 mL/min (A) (based on SCr of 1.4 mg/dL (H)).    Creatinine for last 3 days  12/10/2023:  8:02 AM Creatinine 1.60 mg/dL;  8:02 AM Creatinine 1.62 mg/dL  2023:  6:54 AM Creatinine 1.55 mg/dL  2023:  8:28 AM Creatinine 1.40 mg/dL    Recent Vancomycin Levels (past 3 days)  12/10/2023:  8:02 AM Vancomycin 12.9 ug/mL  2023:  8:28 AM Vancomycin 12.3 ug/mL    Vancomycin IV Administrations (past 72 hours)                     vancomycin (VANCOCIN) 750 mg in sodium chloride 0.9 % 250 mL intermittent infusion (mg) 750 mg New Bag 23 1342     750 mg New Bag 12/10/23 1419                    Nephrotoxins and other renal medications (From now, onward)      Start     Dose/Rate Route Frequency Ordered Stop    23 1400  vancomycin (VANCOCIN) 1,000 mg in 200 mL dextrose intermittent infusion         1,000 mg  200 mL/hr over 1 Hours Intravenous EVERY 24 HOURS 23 1151      23 1600  piperacillin-tazobactam (ZOSYN) 2.25 g vial to attach to  ml bag        Note to Pharmacy: For [facility], [facility] and [facility]: For Zosyn-naive patients, use the \"Zosyn initial dose + extended infusion\" order panel. Dose adjusted per renal dosing policy.  Estimated CrCl = 20-40.    2.25 g  over 30 Minutes Intravenous " EVERY 6 HOURS 12/09/23 1547                 Contrast Orders - past 72 hours (72h ago, onward)      None            Interpretation of levels and current regimen:  Vancomycin level is reflective of AUC less than 400    Has serum creatinine changed greater than 50% in last 72 hours: No    Urine output:  unable to determine    Renal Function: Improving, but is in his creatinine range for CKD, so maybe stable    InsightRX Prediction of Planned New Vancomycin Regimen  Loading dose: N/A  Regimen: 1000 mg IV every 24 hours.  Start time: 14:00 on 12/12/2023  Exposure target: AUC24 (range)400-600 mg/L.hr   AUC24,ss: 500 mg/L.hr  Probability of AUC24 > 400: 91 %  Ctrough,ss: 15.6 mg/L  Probability of Ctrough,ss > 20: 16 %  Probability of nephrotoxicity (Lodise JUNIOR 2009): 11 %    Plan:  Increase Dose to 1000mg IV Q24H (14.2mg/kg/dose)  Vancomycin monitoring method: AUC  Vancomycin therapeutic monitoring goal: 400-600 mg*h/L  Pharmacy will check vancomycin levels as appropriate in 1-3 Days.  Serum creatinine levels will be ordered every 48 hours.    Mohini Lopez, LisbetD, BCPS

## 2023-12-12 NOTE — PROGRESS NOTES
[facility]    Medicine Progress Note - Hospitalist Service, GOLD TEAM 17    Date of Admission:  12/8/2023    Assessment & Plan   Noe Florence is an 87 yo man who has multiple medical problems including chronic kidney disease, atrial fibrillation, coronary artery disease with past CABG and past stent placement, past DVT, polymorphic ventricular tachycardia s/p ICD placement in May-2012, monoclonal gammopathy of unknown significance, polyneuropathy, ischemic cardiomyopathy, left common iliac aneurysm, polymyalgia rheumatica and past colon cancer. Patient presented on 08-Dec-2023 with swollen feet and wound infection. Patient reportedly had completed a course of antibiotic therapy but wounds had carmen worsening despite antibiotics.     Imaging is suggestive of left foot 2nd toe osteomyelitis.      From chart review, patient had been seen by Vascular Surgery on 15-Nov-2023. Patient was deemed to have nonsignificant peripheral vascular disease and great healing potential. It was felt that there was no role for further vascular imaging or intervention to improve healing potential. Patient was felt to be a high surgical risk for repair of left common iliac aneurysm which may be reaching a size of 3.5 cm.     Patient was confused on 09-Dec-2023 and was minimally responsive on 10-Dec-2023 but is more responsive today. This likely is from a metabolic encephalopathy. Patient was febrile yesterday morning and might have had early sepsis. Patient subsequently was found to be positive for Covid-19.    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  No benefit to obtaining MRI at this time.  No indication for surgical intervention.   ---   Wound care with patient to f/u with Podiatry as outpatient.  ---   Appreciate ID consult rec  ---   Failed outpt treatment w/ Amoxicillin  ---   Continue IV Vano and Zosyn pending ID consult eval  ---   ID consult is  planning to switch abx to oral augmentin and doxycycline today w/ plan to treat pt for 6-8 weeks    Asymptomatic COVID-19 infection  ---   Test positive on 12/10  ---   Will complete 3 days course of Remdesivir today  ---   Continue full barrier isolation x 10 days    Acute metabolic encephalopathy  ---   Confusion is improving  ---   Monitor.    Atrial fibrillation   ---   Continue metoprolol for rate control  ---   Continue coumadin for stroke prophylaxis, INR is 1.95 today     CKD3  ---   Baseline creatinine appears to be 1.4-1.8.  ---   Cr was 1.4 today, w/in baseline range  ---   Avoiding nephrotoxins.     Polyneuropathy  ---   Patient to f/u with Neurology as outpatient.     Depression  ELVIN  ---   Continue PTA remeron, risperidal and zoloft      Osteoporosis  ---   Patient usually on fosamax.  ---   Further monitoring and management as outpatient.     Monoclonal gammopathy of unclear significance  ---   Further monitoring as outpatient.  ---   Per outpatient notes, patient is no longer seeing Oncology for this problem.     Left common iliac artery aneurysm  ---   Patient to f/u with Vascular Surgery as outpatient.  ---   Per outpatient Vascular Surgery note, possible repair if aneurysm > 4 cm.     History of colon cancer  ---   Further surveillance as outpatient.     History of DVT  ---   On coumadin and INR is therapeutic.     H/o polymorphic Vtach  ---   S/p ICD placement in 2012  ---   Further monitoring as outpatient.     HTN  ---   Normotensive  ---   Continue PTA Metoprolol with hold parameters.          Diet: Regular Diet Adult    Dominguez Catheter: Not present  Lines: None     Cardiac Monitoring: None  Code Status: Full Code    DISPOSITION:  Probable discharge in 1-2 days with oral abx          Phill House MD  Hospitalist Service, GOLD TEAM [address] [place] [address]  Securely message with [employer] (more info)  Text page via [employer] Paging/Directory   See signed in  provider for up to date coverage information  ______________________________________________________________________    Interval History   Patient noted some dyspnea and cough today.     Physical Exam   Vital Signs: Temp: 97.4  F (36.3  C) Temp src: Oral BP: (!) 147/64 Pulse: 78   Resp: 17 SpO2: 98 % O2 Device: Nasal cannula    Weight: 155 lbs 0 oz  General: Asleep, NAD.  HEENT:  NC/AT, PERRL, EOMI, neck supple,  CVS:  NL s 1 and s2, no m/r/g.  Lungs:  CTA B/L.   Abd:  Soft, + bs  Ext:  Left foot dressing c/d/i  Lymph:  No edema.  Neuro:  Nonfocal.  Musculoskeletal: No calf tenderness to palpation.    Skin:  No rash.  Psychiatry:  Mood and affect appropriate.

## 2023-12-12 NOTE — PLAN OF CARE
"Goal Outcome Evaluation:       VS: BP (!) 140/57 (BP Location: Right arm, Patient Position: Supine, Cuff Size: Adult Regular)   Pulse 62   Temp 98  F (36.7  C) (Oral)   Resp 18   Ht 1.702 m (5' 7.01\")   Wt 70.3 kg (155 lb)   SpO2 97%   BMI 24.27 kg/m       O2: RA   Output: Incontinent of bladder   Last BM: 12/11   Activity: A2 Lift   Skin: Wounds: bilateral toes   Pain: Denied at night   CMS: Disoriented to time/situation. No numbness/tingling   Dressing: CDI   Diet: Reg, meds crushed with applesauce   LDA: R PIV infusing TKO   Equipment: IV pole   Plan: Continue with POC   Additional Info:             "

## 2023-12-12 NOTE — PLAN OF CARE
"Goal Outcome Evaluation:               VS: BP (!) 147/64 (BP Location: Right arm)   Pulse 78   Temp 97.4  F (36.3  C) (Oral)   Resp 17   Ht 1.702 m (5' 7.01\")   Wt 70.3 kg (155 lb)   SpO2 98%   BMI 24.27 kg/m       O2: >90% on room air. Denies SOB/N/V/chest pain. Infrequent nonproductive cough noted.    Output: Incontinent of bladder, thelma care completed    Last BM: Incontinent of stool, LBM 12/12/23   Activity: Ax2 with ceiling lift, turned and reposition q2hrs when in bed. Up in chair for lunch.    Skin: L 2nd toe wound, some scabbing on bilateral shins and R arm. Blanchable redness on sacrum, scabbing on R toes    Pain: Denies pain    CMS: Alert and oriented x2. Disoriented to time and situation.    Dressing: Dressing to left foot changed this shift    Diet: Regular diet, need encouragement to eat. Wife at bedside, assisting with feeding    LDA: PIV infusing TKO   Equipment: IV pole/pump, personal belongings, call light within patient reach    Plan: Will complete 3 days course of Remdesivir today. Continue full barrier isolation x 10 days   Additional Info:                  "

## 2023-12-13 NOTE — PLAN OF CARE
Ortho Update    Spoke with staff, Dr. Boudreaux (Podiatry) regarding this patient.    Recommendations:  - Continue wound cares per WOCN  - surgical shoe  - follow up in clinic in one week with podiatry (patient has podiatrist in [facility] system)    Kori Franz, PGY4

## 2023-12-13 NOTE — PROGRESS NOTES
[facility]    Medicine Progress Note - Hospitalist Service, GOLD TEAM 17    Date of Admission:  12/8/2023    Assessment & Plan   Noe Florence is an 87 yo man who has multiple medical problems including chronic kidney disease, atrial fibrillation, coronary artery disease with past CABG and past stent placement, past DVT, polymorphic ventricular tachycardia s/p ICD placement in May-2012, monoclonal gammopathy of unknown significance, polyneuropathy, ischemic cardiomyopathy, left common iliac aneurysm, polymyalgia rheumatica and past colon cancer. Patient presented on 08-Dec-2023 with swollen feet and wound infection. Patient reportedly had completed a course of antibiotic therapy but wounds had carmen worsening despite antibiotics.     Imaging is suggestive of left foot 2nd toe osteomyelitis.      From chart review, patient had been seen by Vascular Surgery on 15-Nov-2023. Patient was deemed to have nonsignificant peripheral vascular disease and great healing potential. It was felt that there was no role for further vascular imaging or intervention to improve healing potential. Patient was felt to be a high surgical risk for repair of left common iliac aneurysm which may be reaching a size of 3.5 cm.     Patient was confused on 09-Dec-2023 and was minimally responsive on 10-Dec-2023 but became more responsive afterwards. This likely is from a metabolic encephalopathy. Patient was febrile morning of 12/10/23 and might have had early sepsis. Patient subsequently was found to be positive for Covid-19.    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  No benefit to obtaining MRI at this time.  No indication for surgical intervention.   ---   Wound care with patient to f/u with Podiatry as outpatient.  ---   Appreciate ID consult rec  ---   Failed outpt treatment w/ Amoxicillin  Per ortho:  Continue wound cares per WOCN  surgical shoe  Follow up in  clinic in one week with podiatry (patient has podiatrist in [facility] system)  Continue IV Vano and Zosyn  ID consult is planning to switch abx to oral augmentin and doxycycline w/ plan to treat pt for 6-8 weeks (await final confirmation)    #Asymptomatic COVID-19 infection  -Test positive on 12/10  - Will complete 3 days course of Remdesivir.   Continue full barrier isolation x 10 days    #Acute metabolic encephalopathy  ---   Confusion is improving  ---   Monitor.    #Atrial fibrillation   ---   Continue metoprolol for rate control  ---   Continue coumadin for stroke prophylaxis, INR is 2.07 today     #CKD3  ---   Baseline creatinine appears to be 1.4-1.8.  ---   Cr was 1.33 today, w/in baseline range  ---   Avoiding nephrotoxins.     #Polyneuropathy  ---   Patient to f/u with Neurology as outpatient.     #Depression  #ELVIN  ---   Continue PTA remeron, risperidal and zoloft      #Osteoporosis  ---   Patient usually on fosamax.  ---   Further monitoring and management as outpatient.     #Monoclonal gammopathy of unclear significance  ---   Further monitoring as outpatient.  ---   Per outpatient notes, patient is no longer seeing Oncology for this problem.     #Left common iliac artery aneurysm  ---   Patient to f/u with Vascular Surgery as outpatient.  ---   Per outpatient Vascular Surgery note, possible repair if aneurysm > 4 cm.     #History of colon cancer  ---   Further surveillance as outpatient.     #History of DVT  ---   On coumadin and INR is therapeutic.     #H/o polymorphic Vtach  ---   S/p ICD placement in 2012  ---   Further monitoring as outpatient.     #HTN  ---   Normotensive  ---   Continue PTA Metoprolol with hold parameters.    #Disposition  - per discussion on rounds, patient's PCA team wouldn't resume until he's clear from COVID-19 standpoint which wouldn't be until 10/21/23 when he comes out of isolation. Needs OH lift device at home before discharge; likely today back home if device available and  ID confirms oral abx.       Diet: Regular Diet Adult    Dominguez Catheter: Not present  Lines: None     Cardiac Monitoring: None  Code Status: Full Code        THU LINCOLN MD  Hospitalist Service, GOLD TEAM 17  [facility]  Securely message with [employer] (more info)  Text page via [employer] Paging/Directory   See signed in provider for up to date coverage information  ______________________________________________________________________    Interval History   Afebrile and HDS  Not hypoxic   Denies any acute complaints     Physical Exam   Vital Signs: Temp: 97.7  F (36.5  C) Temp src: Axillary BP: 138/69 Pulse: 61   Resp: 16 SpO2: 97 % O2 Device: None (Room air)    Weight: 155 lbs 0 oz  General: Asleep, NAD.  HEENT:  NC/AT, PERRL, EOMI, neck supple,  CVS:  NL s 1 and s2, no m/r/g.  Lungs:  Normal WOB   Abd:  Soft, + bs  Ext:  Left foot dressing c/d/i  Lymph:  No edema.  Neuro:  Nonfocal.  Musculoskeletal: left foot in dressing.   Skin:  No rash.  Psychiatry:  Mood and affect appropriate.

## 2023-12-13 NOTE — CONSULTS
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 Admitted with R 2nd toe wound and c/f osteomyelitis.      Infected L 2nd toe wound with surrounding cellulitis, and concern for osteomyelitis   Hx of neuropathy 2/2 MGUS   Long history of lower extremity wounds. Evaluated 11/15 in Vascular clinic, CTA imaging w/o stenosis, and ABIs adequate thus not felt to have significant PAD causing wounds. 1mo history of poor healing wound R second toe, with extension into webspace between 1st and 2nd toes. No DM history, though A1c checked years ago. Following in podiatry clinic, 2 week history of progression and surrounding cellulitis. XR in ED w/ ? Of osteomyelitis. Based on work up thus far suspect wound is secondary to neuropathy and pressure ulcer. Requires further IV abx, and imaging to rule out underlying osteomyelitis.   - Cont IV Vancomycin   - Follow blood cultures   - Obtain MRSA swab   - Podiatry consult  - Attempted to obtain MRI, unable to get tonight because needs ICD clearance first (though per review suspect ICD should be compatible). Per d/w MRI desk may not be able to get until early next week. Will obtain CT scan of L foot tonight  - Wound care consult   - For pain: PTA tylenol 1000mg BID   - Check A1C     Assessment:      Areas visualized during today's visit:  left elbow    Wound location: left elbow     Last photo: 12/13  Wound due to: Skin Tear  Wound history/plan of care: prior distal humerus  "fx early 2019(per chart review)  Wound base: 50 % epidermis and dermis     Palpation of the wound bed: normal      Drainage: small     Description of drainage: bloody and dried     Measurements (length x width x depth, in cm): 2  x 1.5  x  0.1 cm      Tunneling: N/A     Undermining: N/A  Periwound skin: Ecchymosis      Color: purple      Temperature: normal   Odor: none  Pain: no grimacing or signs of discomfort, none  Pain interventions prior to dressing change: N/A  Treatment goal: Heal , Decrease moisture, and Protection  STATUS: initial assessment  Supplies ordered: supplies stored on unit    Below wounds assessment from 12/11    Wound location: bilateral toes     12/11: left foot toes       12/11: right foot toes    Last photo: 12/11/23  Wound due to: Pressure Injury and Diabetic Ulcer  Wound history/plan of care: See HPI  Wound base: Left foot 10/90 % dermis and superficial scab, right foot 100 % superficial scab     Palpation of the wound bed: normal      Drainage: small     Description of drainage: serosanguinous- left foot only     Measurements (length x width x depth, in cm): multiple toes- see photo     Tunneling: N/A     Undermining: N/A  Periwound skin: Intact      Color: normal and consistent with surrounding tissue      Temperature: normal   Odor: none  Pain: denies , none  Pain interventions prior to dressing change: patient tolerated well and slow and gentle cares   Treatment goal: Heal , Infection control/prevention, Maintain (prevention of deterioration), and Protection  STATUS: initial assessment  Supplies ordered: ordered xeroform, discussed with RN, and discussed with patient and spouse     Treatment Plan:     Left elbow wound(s): Every other day cleanse with saline and pat dry. Apply vaseline gauze/ adaptic over wound. Cover with mepilex border dressing.      Left 1-3rd toe wound(s): Daily and PRN when soiled change ABD pad  Spray with wound cleanser and gently pat dry  Cut a 1\" strip of " Xeroform (132325) and wind around affected toes  Cover with ABD pad folded over toes   Secure with 1/2 kerlix roll and tape (no tape directly on skin)     Orders: Reviewed and Written    RECOMMEND PRIMARY TEAM ORDER: None, at this time  Education provided: plan of care and wound progress  Discussed plan of care with: Patient, Family, and Nurse  [facility] nurse follow-up plan: weekly  Notify WOC if wound(s) deteriorate.  Nursing to notify the Provider(s) and re-consult the [facility] Nurse if new skin concern.    DATA:     Current support surface: Standard  Standard gel/foam mattress (IsoFlex, Atmos air, etc)  Containment of urine/stool: Incontinent pad in bed  BMI: Body mass index is 24.27 kg/m .   Active diet order: Orders Placed This Encounter      Regular Diet Adult     Output: No intake/output data recorded.     Labs:   Recent Labs   Lab 12/13/23  0713 12/10/23  1213 12/10/23  0802 12/08/23  2039 12/08/23  1311   ALBUMIN  --   --  3.5  --  3.5   HGB 7.7*   < > 8.4*   < > 8.3*   INR 2.07*   < > 1.92*   < > 1.88*   WBC 4.0   < > 7.9   < > 6.7   A1C  --   --   --   --  6.0*    < > = values in this interval not displayed.     Pressure injury risk assessment:   Sensory Perception: 4-->no impairment  Moisture: 3-->occasionally moist  Activity: 2-->chairfast  Mobility: 3-->slightly limited  Nutrition: 3-->adequate  Friction and Shear: 2-->potential problem  Chandler Score: 17    Belinda Bains RN CWOCN  Pager no longer is use, please contact through [employer] group: [facility] Nurse [facility]  Dept. Office Number: [phone]

## 2023-12-13 NOTE — PROGRESS NOTES
General ID Service: Follow Up Note Blue team      Patient:  Noe Florence, Date of birth 1935, Medical record number [id number]  Date of Visit:  December 13, 2023         Assessment and Recommendations:   ID Problem List:    CKD  Atrial fibrillation  CAD s/p CABP  MGUS  No antibiotic allergies  Osteomyelitis of L 2nd toe; prior culture from 9/2023 revealed many MRSA that was also bactrim-R, tetracycline and clindamycin sensitive.  Covid-19 PCR positive, not hypoxic  CXR with fluffy opacities bilaterally    Recommendations:  -could change from IV vanco+pip/tazo to doxycycline 100 BID and amox/clavulanate 500/125 Po TID. His Cr was elevated on admit but trended down. If future Cr get to be 1.7 or higher, would reduce amox/clav to 500/125 BID.  -plan for 6-8 weeks of oral therapy, so at least until 1/20/23, total duration TBD based on clinical response  -he should follow up in ID clinic in 6 weeks  -continue Remdesivir, tentatively for a 3 -day course (last dose Tues 12/12)  -will monitor for O2 needs, which should prompt consideration of additional COVID therapeutics    Discussion:    His toe infection did not improve with outpatient amoxicillin; seems to have improved with broad spectrum vanc + pip/tazo. He had a prior culture in 202 that showed MRSA; even though his nares here is negative for SA, I would presumed MRSA involvement in addition to possibly polymicrobial aerobes and anaerobes. I don't have a strong suspicion for Pseudomonas.    ID will sign off, but please page with additional questions. Total floor time =35 min    Candis Vargas MD   of Medicine, Division of Infectious Diseases  Contact me on the IGG allie or console  [facility] [phone]        Interval History:     Seems comfortable. No O2 req. WAs sleeping when I saw him this am.         Current Antimicrobials   Current:    Vancomycin and Zosyn         Physical Exam:   Ranges for vital signs:  Temp:  [97.7  F (36.5   C)-98.4  F (36.9  C)] 98  F (36.7  C)  Pulse:  [61-79] 63  Resp:  [16] 16  BP: (127-138)/(50-70) 130/70  SpO2:  [92 %-98 %] 96 %  No intake or output data in the 24 hours ending 12/10/23 1148  Exam:  GENERAL:  elderly, very sleepy, mouth breathing  ENT:  Head is normocephalic, atraumatic. Oropharynx is dry without exudates or ulcers.  EYES:  Eyes have anicteric sclerae.    NECK:  Supple.  LUNGS:  Clear to auscultation anteriorly  CARDIOVASCULAR:  Regular rate and rhythm with no murmurs, gallops or rubs.  ABDOMEN:  Normal bowel sounds, soft, nontender.  EXT: Extremities warm and without edema.  SKIN:  No acute rashes.  Line is in place without any surrounding erythema.  NEUROLOGIC:  Grossly nonfocal.         Laboratory Data:   Reviewed.  Pertinent for:    Covid-19 PCR positive  INR 1.92  Cr 1.6  WBC 7.9    Culture data:    Blood cultures 12/8/23 ngtd x2  Blood cultures 12/10/23 ngtd  12/8/2023 MRSA nares negative         Imaging:      Exam: XR CHEST PORT 1 VIEW, 12/10/2023 11:52 AM     Indication: Fever, coarsened breath sounds; concern for pneumonia     Comparison: 9/18/2021 CT, 12/6/2023 CT     Findings:   Left chest wall ICD leads are intact and in stable position. Intact  median sternotomy wires with mediastinal surgical clips and mitral  valve prosthesis. Stable enlarged cardiac silhouette. The pulmonary  vasculature is indistinct. No appreciable pleural effusion or  pneumothorax. Perihilar and basilar prominent mixed interstitial and  streaky airspace opacities. Low lung volumes. Mild asymmetric  elevation of the right hemidiaphragm.                                                                      Impression: Enlarged cardiac silhouette with low lung volumes and  perihilar and basilar predominant mixed interstitial and streaky  airspace opacities, favored to represent a combination of pulmonary  edema and atelectasis, though infection is not excluded. Similar  findings are seen in the lung bases on  12/6/2023 CT.

## 2023-12-13 NOTE — PLAN OF CARE
"Goal Outcome Evaluation:      Plan of Care Reviewed With: patient    Overall Patient Progress: no changeOverall Patient Progress: no change    Outcome Evaluation: Covid +. RA >90. Pt states he is tired. Denies CP or SOB.    VS: /69 (BP Location: Left arm)   Pulse 61   Temp 97.7  F (36.5  C) (Axillary)   Resp 16   Ht 1.702 m (5' 7.01\")   Wt 70.3 kg (155 lb)   SpO2 97%   BMI 24.27 kg/m         O2: Sating > 90% at room air. Infrequent non productive cough x1    Output: Incontinent.    Last BM: 12/12/23. Incontinent. Passing flatus.    Activity: Ax2 with cares and repositioning. Ceiling lift with transfer.   Repositioned q 2hrs. Turned to left side.    Skin: Left 2nd toe wound. Left 3rd and big toe scabbing. Dressing changed today; due to be changed 12/14.   Scabs on R toes. Scabbing on bilateral shins  L elbow skin tear. Dressing changed; due to be changed 12/18.   Pain: Denies pain.    CMS: A and oriented x3. Disoriented to time, place, situation.    Dressing: Dressing changed q daily. Dressings CDI.    Diet: Regular diet.    LDA: PIV infusing NS between abx   Equipment: IV pole/pump, and personal belongings.   Plan: Continue with plan of care.    Additional Info: On special precaution- covid.   RN mngt K- 4.1  Pts wife requesting call from pt. Attempted two times but phone was busy. Evening RN to attempt to call Wife with pt.            "

## 2023-12-13 NOTE — PROGRESS NOTES
Found that 3 AM Piperacillin-tazobactam had not infused last night. Pharmacist states that 9 AM Piperacillin-tazobactam is sufficient. 9 AM  Piperacillin-tazobactam administered.

## 2023-12-13 NOTE — PLAN OF CARE
"Goal Outcome Evaluation:           VS: /63 (Patient Position: Sitting, Cuff Size: Adult Regular)   Pulse 65   Temp 98.4  F (36.9  C) (Oral)   Resp 16   Ht 1.702 m (5' 7.01\")   Wt 70.3 kg (155 lb)   SpO2 98%   BMI 24.27 kg/m       O2: Sating > 90% at room air. Infrequent non productive cough x1    Output: Incontinent. Wet brief changed x1     Last BM: 12/12/23. Incontinent. Passing flatus.    Activity: Ax2 with cares and repositioning. Ceiling lift with transfer.   Repositioned q 2hrs. Turned to left side.    Skin: Left 2nd toe wound.   Left 3rd and big toe scabbing.   Scabs on R toes. Scabbing on bilateral shins   Pain: Denies pain.    CMS: A and oriented x2. Disoriented to time and situation. Confuse.    Dressing: Dressing changed q daily. Changed in the day. Dressing is with dry drainage.    Diet: Regular diet.    LDA: PIV infusing TKO.    Equipment: IV pole/pump, and personal belongings.   Plan: Continue with plan of care.    Additional Info: On special precaution- covid.   BYRON parkgt K- 3.6                  "

## 2023-12-14 NOTE — PLAN OF CARE
"Goal Outcome Evaluation:    Pt. Is Alert only to self and disoriented to time, place situation. Writer reoriented few times this shift. No acute changes in this shift. Continued POC.    Recent VS:    /58 (BP Location: Right arm)   Pulse 84   Temp 97.9  F (36.6  C) (Oral)   Resp 16   Ht 1.702 m (5' 7.01\")   Wt 70.3 kg (155 lb)   SpO2 90%   BMI 24.27 kg/m     "

## 2023-12-14 NOTE — PROGRESS NOTES
[facility]    Medicine Progress Note - Hospitalist Service, GOLD TEAM 17    Date of Admission:  12/8/2023    Assessment & Plan   Noe Florence is an 89 yo man who has multiple medical problems including chronic kidney disease, atrial fibrillation, coronary artery disease with past CABG and past stent placement, past DVT, polymorphic ventricular tachycardia s/p ICD placement in May-2012, monoclonal gammopathy of unknown significance, polyneuropathy, ischemic cardiomyopathy, left common iliac aneurysm, polymyalgia rheumatica and past colon cancer. Patient presented on 08-Dec-2023 with swollen feet and wound infection. Patient reportedly had completed a course of antibiotic therapy but wounds had carmen worsening despite antibiotics.     Imaging is suggestive of left foot 2nd toe osteomyelitis.      From chart review, patient had been seen by Vascular Surgery on 15-Nov-2023. Patient was deemed to have nonsignificant peripheral vascular disease and great healing potential. It was felt that there was no role for further vascular imaging or intervention to improve healing potential. Patient was felt to be a high surgical risk for repair of left common iliac aneurysm which may be reaching a size of 3.5 cm.     Patient was confused on 09-Dec-2023 and was minimally responsive on 10-Dec-2023 but is more responsive today. This likely is from a metabolic encephalopathy. Patient was febrile yesterday morning and might have had early sepsis. Patient subsequently was found to be positive for Covid-19.    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   ---   Wound care per [facility].  ---   Patient to f/u with Podiatry as outpatient.  ---   Appreciate ID consult rec  ---   Failed outpt treatment w/ Amoxicillin  ---   S/p IV Vano and Zosyn, 12/8 - 12/13/23  ---   Currently on Augmentin and  doxycycline, started on 12/13 w/ plan to treat pt for 6-8 weeks    Asymptomatic COVID-19 infection  ---   Test positive on 12/10  ---   Will complete 3 days course of Remdesivir today  ---   Continue full barrier isolation x 10 days (12/10 - 12/21//23)    Acute metabolic encephalopathy  ---   Confusion is improving  ---   Monitor.    Atrial fibrillation   ---   Continue metoprolol for rate control  ---   Continue coumadin for stroke prophylaxis, INR is 1.95 today     CKD3  ---   Baseline creatinine appears to be 1.4-1.8.  ---   Cr was 1.29 today, w/in baseline range  ---   Avoiding nephrotoxins.     Polyneuropathy  ---   Patient to f/u with Neurology as outpatient.     Depression  ELVIN  ---   Continue PTA remeron, risperidal and zoloft      Osteoporosis  ---   Patient usually on fosamax.  ---   Further monitoring and management as outpatient.     Monoclonal gammopathy of unclear significance  ---   Further monitoring as outpatient.  ---   Per outpatient notes, patient is no longer seeing Oncology for this problem.     Left common iliac artery aneurysm  ---   Patient to f/u with Vascular Surgery as outpatient.  ---   Per outpatient Vascular Surgery note, possible repair if aneurysm > 4 cm.     History of colon cancer  ---   Further surveillance as outpatient.     History of DVT  ---   On coumadin and INR is therapeutic at 2.12 today.     H/o polymorphic Vtach  ---   S/p ICD placement in 2012  ---   Further monitoring as outpatient.     HTN  ---   Normotensive  ---   Continue PTA Metoprolol with hold parameters.          Diet: Regular Diet Adult  Room Service    Dominguez Catheter: Not present  Lines: None     Cardiac Monitoring: None  Code Status: Full Code    DISPOSITION:   Patient lives with his wife who was also diagnosed with COVID-19 on 12/13.  Patient's PCA unable to provide services until patient and patient's wife completes quarantine for COVID, thus may be here at least till 12/21/23            Phill House,  MD  Hospitalist Service, GOLD TEAM 17  [facility]  Securely message with [employer] (more info)  Text page via [facility] Paging/Directory   See signed in provider for up to date coverage information  ______________________________________________________________________    Interval History   No complaints  Uneventful night  Has a bedside sitter  Eating breakfast when seen    Physical Exam   Vital Signs: Temp: 98.1  F (36.7  C) Temp src: Oral BP: (!) 155/74 Pulse: 66   Resp: 16 SpO2: 98 % O2 Device: None (Room air)    Weight: 155 lbs 0 oz  General: Awake, alert, follows commands, eating breakfast, NAD.  HEENT:  NC/AT, PERRL, EOMI, neck supple,  CVS:  NL s 1 and s2, no m/r/g.  Lungs:  CTA B/L.   Abd:  Soft, + bs  Ext:  Left foot dressing c/d/i  Lymph:  No edema.  Neuro:  Nonfocal.  Musculoskeletal: No calf tenderness to palpation.    Skin:  No rash.  Psychiatry:  Mood and affect appropriate.

## 2023-12-14 NOTE — PLAN OF CARE
"Goal Outcome Evaluation:      Plan of Care Reviewed With: patient    Overall Patient Progress: no changeOverall Patient Progress: no change    Outcome Evaluation: Pt is alert to self and place. Incontinent of bowel and bladder. Denies pain, SOB, CP, and numbness/tingling.    VS: /58 (BP Location: Right arm)   Pulse 84   Temp 97.9  F (36.6  C) (Oral)   Resp 16   Ht 1.702 m (5' 7.01\")   Wt 70.3 kg (155 lb)   SpO2 90%   BMI 24.27 kg/m         O2: Sating > 90% at room air. Infrequent non productive cough x1    Output: Incontinent.    Last BM: 12/12/23. Incontinent. Passing flatus.    Activity: Ax2 with cares and repositioning. Ceiling lift with transfer.   Repositioned q 2hrs. Turned to left side.    Skin: Left 2nd toe wound. Left 3rd and big toe scabbing. Dressing changed today; due to be changed 12/15.  Scabs on R toes. Scabbing on bilateral shins  L elbow skin tear. Dressing changed; due to be changed 12/15.    Pain: Denies pain.    CMS: A and oriented x3. Disoriented to time, place, situation.    Dressing: Dressing changed q daily. Dressings CDI.    Diet: Regular diet.    LDA: PIV infusing NS between abx   Equipment: IV pole/pump, and personal belongings.   Plan: Continue with plan of care.    Additional Info: On special precaution- covid.   RN mngt K- 4.1  Pts wife requesting call from pt. Attempted two times but phone was busy. Evening RN to attempt to call Wife with pt.  Wife called about shital lift and stated that normally shital lifts are 26\" tall and 43\" long; Wife states that home is small and can only occupy shital lift of length of 35\". Spoke to Care Coordinator who will follow up on this.         "

## 2023-12-14 NOTE — PROGRESS NOTES
Pt states okay for this writer to speak with Wife. Called Wife; Left message on machine to call back. If Wife calls back, RN will ask if pt is able to swallow large pills? Augmentin is a large pill; Pharmacist states okay to crush. Also, why does Tamsulosin, Jantovin, Warfarin, abx need to be swallowed whole? Once wife calls back, if RN could contact Pharmacist.

## 2023-12-14 NOTE — PLAN OF CARE
"Goal Outcome Evaluation:       /58 (BP Location: Right arm)   Pulse 84   Temp 97.9  F (36.6  C) (Oral)   Resp 16   Ht 1.702 m (5' 7.01\")   Wt 70.3 kg (155 lb)   SpO2 90%   BMI 24.27 kg/m      Overall Patient Progress: no changeOverall Patient Progress: no change    Outcome Evaluation: Pt is alert and oriented to self only, disoriented time, place, and situation, No change this shift. Pt is incontinent of bladder this shift, wearing brief. Denies pain, SOB, chest pain, numbness and tingling.      "

## 2023-12-15 NOTE — PROGRESS NOTES
Care Management Follow Up    Length of Stay (days): 7    Expected Discharge Date: 12/21/2023     Concerns to be Addressed: discharge planning     Patient plan of care discussed at interdisciplinary rounds: Yes    Anticipated Discharge Disposition: Home Care, DME     Anticipated Discharge Services: PCA  Anticipated Discharge DME: Other (see comment) (Lupillo Lift)    [facility]  Address  [address] [place] [address]    Contact Information  Phone: [phone]  Fax: [phone]    Patient/family educated on Medicare website which has current facility and service quality ratings: yes  Education Provided on the Discharge Plan: Yes  Patient/Family in Agreement with the Plan: yes    Referrals Placed by CM/SW: Homecare  Private pay costs discussed: Not applicable    Additional Information:  Spoke with patient's SO, Rica, via phone due to patient and wife both having covid. Patient will need a lupillo lift at discharge due to non-weight bearing status. Patient has PCA assistance in the morning and evening daily. Rica states they live in a small home and will need to rearrange the living room in order for lupillo lift to fit into their house. Orders faxed to [facility] on 12/11 for a lupillo lift. Patient is open to [facility] for skilled nursing and physical therapy. KP orders to be faxed prior to discharge. [facility] will continue to follow patient progress. Patient's PCA services will be able to see him once he has completed the 10 day quarantine for coved. PCA start date 12/21/2023.    Sudha Brown RN, BSN  Care Coordinator,  Ortho  Phone [phone]  Pager [phone]

## 2023-12-15 NOTE — PROGRESS NOTES
"VS: BP (!) 140/63   Pulse 65   Temp 98.5  F (36.9  C) (Oral)   Resp 18   Ht 1.702 m (5' 7.01\")   Wt 70.3 kg (155 lb)   SpO2 99%   BMI 24.27 kg/m     O2:  Room Air    Output: Abdominal sounds in all  4    Last BM:  12/12/23   Activity:  Assist 2   Up for meals?  Yes / Feeder / Tray Set up   Skin: Left 2nd toe wound   Left 3rd and large toe  Scabs on Right toe   Scabbing on Bilateral shins  Left Elbow skin Tear  Dressing changes 12/15/2023   Pain:  Denies    CMS:  A&OX 2   Dressing:  Yes    Diet:  Regular    LDA:  Right PIV   Equipment:  Personal Belongings    Plan:  POC   Additional Info: Pharmacy Okay to Crush         "

## 2023-12-15 NOTE — PROGRESS NOTES
[facility]    Medicine Progress Note - Hospitalist Service, GOLD TEAM 17    Date of Admission:  12/8/2023    Assessment & Plan   Noe Florence is an 89 yo man who has multiple medical problems including chronic kidney disease, atrial fibrillation, coronary artery disease with past CABG and past stent placement, past DVT, polymorphic ventricular tachycardia s/p ICD placement in May-2012, monoclonal gammopathy of unknown significance, polyneuropathy, ischemic cardiomyopathy, left common iliac aneurysm, polymyalgia rheumatica and past colon cancer. Patient presented on 08-Dec-2023 with swollen feet and wound infection. Patient reportedly had completed a course of antibiotic therapy but wounds had carmen worsening despite antibiotics.     Imaging is suggestive of left foot 2nd toe osteomyelitis.      From chart review, patient had been seen by Vascular Surgery on 15-Nov-2023. Patient was deemed to have nonsignificant peripheral vascular disease and great healing potential. It was felt that there was no role for further vascular imaging or intervention to improve healing potential. Patient was felt to be a high surgical risk for repair of left common iliac aneurysm which may be reaching a size of 3.5 cm.     Patient was confused on 09-Dec-2023 and was minimally responsive on 10-Dec-2023 but is more responsive today. This likely is from a metabolic encephalopathy. Patient was febrile yesterday morning and might have had early sepsis. Patient subsequently was found to be positive for Covid-19.    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   ---   Wound care per [facility].  ---   Patient to f/u with Podiatry as outpatient.  ---   Appreciate ID consult rec  ---   Failed outpt treatment w/ Amoxicillin  ---   S/p IV Vano and Zosyn, 12/8 - 12/13/23  ---   Currently on Augmentin and  doxycycline, started on 12/13 w/ plan to treat pt for 6-8 weeks    Asymptomatic COVID-19 infection  ---   Test positive on 12/10  ---   Will complete 3 days course of Remdesivir today  ---   Continue full barrier isolation x 10 days (12/10 - 12/21//23)    Acute metabolic encephalopathy  ---   Confusion is improving  ---   Monitor.    Atrial fibrillation   ---   Continue metoprolol for rate control  ---   Continue coumadin for stroke prophylaxis, INR is 1.95 today     CKD3  ---   Baseline creatinine appears to be 1.4-1.8.  ---   Cr was 1.29 today, w/in baseline range  ---   Avoiding nephrotoxins.     Polyneuropathy  ---   Patient to f/u with Neurology as outpatient.     Depression  ELVIN  ---   Continue PTA remeron, risperidal and zoloft      Osteoporosis  ---   Patient usually on fosamax.  ---   Further monitoring and management as outpatient.     Monoclonal gammopathy of unclear significance  ---   Further monitoring as outpatient.  ---   Per outpatient notes, patient is no longer seeing Oncology for this problem.     Left common iliac artery aneurysm  ---   Patient to f/u with Vascular Surgery as outpatient.  ---   Per outpatient Vascular Surgery note, possible repair if aneurysm > 4 cm.     History of colon cancer  ---   Further surveillance as outpatient.     History of DVT  ---   On coumadin and INR is therapeutic at 2.12 today.     H/o polymorphic Vtach  ---   S/p ICD placement in 2012  ---   Further monitoring as outpatient.     HTN  ---   Normotensive  ---   Continue PTA Metoprolol with hold parameters.          Diet: Regular Diet Adult  Room Service    Dominguez Catheter: Not present  Lines: None     Cardiac Monitoring: None  Code Status: Full Code    DISPOSITION:   Patient lives with his wife who was also diagnosed with COVID-19 on 12/13.  Patient's PCA unable to provide services until patient and patient's wife completes quarantine for COVID, thus may be here at least till 12/21/23            Phill House,  MD  Hospitalist Service, GOLD TEAM 17  [facility]  Securely message with [employer] (more info)  Text page via [facility] Paging/Directory   See signed in provider for up to date coverage information  ______________________________________________________________________    Interval History   No complaints  Uneventful night  Has a bedside sitter      Physical Exam   Vital Signs: Temp: 97.6  F (36.4  C) Temp src: Oral BP: (!) 148/63 Pulse: 64   Resp: 16 SpO2: 97 % O2 Device: None (Room air)    Weight: 155 lbs 0 oz  General: Awake, alert, follows commands, eating breakfast, NAD.  HEENT:  NC/AT, PERRL, EOMI, neck supple,  CVS:  NL s 1 and s2, no m/r/g.  Lungs:  CTA B/L.   Abd:  Soft, + bs  Ext:  Left foot dressing c/d/i  Lymph:  No edema.  Neuro:  Nonfocal.  Musculoskeletal: No calf tenderness to palpation.    Skin:  No rash.  Psychiatry:  Mood and affect appropriate.

## 2023-12-15 NOTE — PLAN OF CARE
"Goal Outcome Evaluation:      Plan of Care Reviewed With: patient    Overall Patient Progress: no changeOverall Patient Progress: no change    Outcome Evaluation: Incontinent of Bowel and bladder. Denies pain, SOB, CP, numbness and tingling.    VS: BP (!) 148/63   Pulse 64   Temp 97.6  F (36.4  C)   Resp 16   Ht 1.702 m (5' 7.01\")   Wt 70.3 kg (155 lb)   SpO2 97%   BMI 24.27 kg/m      O2:  Room Air    Output: Abdominal sounds in all  4 quadrants. Incontinent of bowel and bladder    Last BM:  12/12/23   Activity:  Assist 2 with lift   Up for meals?  Yes / Feeder / Tray Set up   Skin: Left 2nd toe wound and Left 3rd and large toe. Completed dressing change today. Dressing change due 12/16  Scabs on Right toe   Scabbing on L shin; skin tear. Follow skin tear  Completed dressing change today. Dressing change due 12/17  Left Elbow skin Tear. Completed dressing change today.  Dressing change due 12/20  Has redness on scrotum. Barrier cream applied.    Pain:  Denies    CMS:  A&Ox 2    Dressing:  Yes    Diet:  Regular    LDA:  Right PIV   Equipment:  Personal Belongings    Plan:  POC. Plan to discharge to TCU.    Additional Info: Turned q 2 hours.   Pt very tired during shift; more tired than yesterday.         "

## 2023-12-16 NOTE — PROGRESS NOTES
[facility]    Medicine Progress Note - Hospitalist Service, GOLD TEAM 17    Date of Admission:  12/8/2023    Assessment & Plan   Noe Florence is an 87 yo man who has multiple medical problems including chronic kidney disease, atrial fibrillation, coronary artery disease with past CABG and past stent placement, past DVT, polymorphic ventricular tachycardia s/p ICD placement in May-2012, monoclonal gammopathy of unknown significance, polyneuropathy, ischemic cardiomyopathy, left common iliac aneurysm, polymyalgia rheumatica and past colon cancer. Patient presented on 08-Dec-2023 with swollen feet and wound infection. Patient reportedly had completed a course of antibiotic therapy but wounds had carmen worsening despite antibiotics.     Imaging is suggestive of left foot 2nd toe osteomyelitis.      From chart review, patient had been seen by Vascular Surgery on 15-Nov-2023. Patient was deemed to have nonsignificant peripheral vascular disease and great healing potential. It was felt that there was no role for further vascular imaging or intervention to improve healing potential. Patient was felt to be a high surgical risk for repair of left common iliac aneurysm which may be reaching a size of 3.5 cm.     Patient was confused on 09-Dec-2023 and was minimally responsive on 10-Dec-2023 but is more responsive today. This likely is from a metabolic encephalopathy. Patient was febrile yesterday morning and might have had early sepsis. Patient subsequently was found to be positive for Covid-19.    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   ---   Wound care per [facility].  ---   Patient to f/u with Podiatry as outpatient.  ---   Appreciate ID consult rec  ---   Failed outpt treatment w/ Amoxicillin  ---   S/p IV Vano and Zosyn, 12/8 - 12/13/23  ---   Currently on Augmentin and  doxycycline, started on 12/13 w/ plan to treat pt for 6-8 weeks    Asymptomatic COVID-19 infection  ---   Test positive on 12/10  ---   Will complete 3 days course of Remdesivir today  ---   Continue full barrier isolation x 10 days (12/10 - 12/21//23)    Acute metabolic encephalopathy  ---   Confusion is improving  ---   Monitor.    Atrial fibrillation   ---   Continue metoprolol for rate control  ---   Continue coumadin for stroke prophylaxis, INR is 1.95 today     CKD3  ---   Baseline creatinine appears to be 1.4-1.8.  ---   Cr was 1.29 today, w/in baseline range  ---   Avoiding nephrotoxins.     Polyneuropathy  ---   Patient to f/u with Neurology as outpatient.     Depression  ELVIN  ---   Continue PTA remeron, risperidal and zoloft      Osteoporosis  ---   Patient usually on fosamax.  ---   Further monitoring and management as outpatient.     Monoclonal gammopathy of unclear significance  ---   Further monitoring as outpatient.  ---   Per outpatient notes, patient is no longer seeing Oncology for this problem.     Left common iliac artery aneurysm  ---   Patient to f/u with Vascular Surgery as outpatient.  ---   Per outpatient Vascular Surgery note, possible repair if aneurysm > 4 cm.     History of colon cancer  ---   Further surveillance as outpatient.     History of DVT  ---   On coumadin and INR is therapeutic at 2.12 today.     H/o polymorphic Vtach  ---   S/p ICD placement in 2012  ---   Further monitoring as outpatient.     HTN  ---   Normotensive  ---   Continue PTA Metoprolol with hold parameters.          Diet: Regular Diet Adult  Room Service    Dominguez Catheter: Not present  Lines: None     Cardiac Monitoring: None  Code Status: Full Code    DISPOSITION:   Patient lives with his wife who was also diagnosed with COVID-19 on 12/13.  Patient's PCA unable to provide services until patient and patient's wife completes quarantine for COVID, thus may be here at least till 12/21/23            Phill House,  MD  Hospitalist Service, GOLD TEAM 17  M [facility]  Securely message with [employer] (more info)  Text page via [facility] Paging/Directory   See signed in provider for up to date coverage information  ______________________________________________________________________    Interval History   No complaints  Uneventful night  Eating breakfast when seen this am  Has a bedside sitter      Physical Exam   Vital Signs: Temp: 97.6  F (36.4  C) Temp src: Axillary BP: 118/61 Pulse: 77   Resp: 14 SpO2: 96 % O2 Device: None (Room air)    Weight: 155 lbs 0 oz  General: Awake, alert, follows commands, eating breakfast, NAD.  HEENT:  NC/AT, PERRL, EOMI, neck supple,  CVS:  NL s 1 and s2, no m/r/g.  Lungs:  CTA B/L.   Abd:  Soft, + bs  Ext:  Left foot dressing c/d/i  Lymph:  No edema.  Neuro:  Nonfocal.  Musculoskeletal: No calf tenderness to palpation.    Skin:  No rash.  Psychiatry:  Mood and affect appropriate.

## 2023-12-16 NOTE — PLAN OF CARE
Goal Outcome Evaluation:                  VSS. Denies SOB, CP, N/T, concerns. Assist x2 w/ ceiling lift, incontinent of bowel & bladder. Pt is confused, bed alarm on. Alert to self and place. Dressings CDI. Infrequent loose cough present. IS to 500, encouraged use. RA. LLE and L elbow skin tear, scattered bruising and scabbing present. C/o pain to L foot, boot was twisted, readjusted and did page MD as no PRN pain meds are available. R) PIV SL, regular diet, pt needs assistance with feeding. Takes pills in applesauce. Pt resting comfortably in between cares, call light within reach. Paged provider of potassium protocol missed during morning blood draw, asking if necessary to reorder for today as last levels stable, did not hear back yet. Passed onto oncoming RN. Continue POC.

## 2023-12-16 NOTE — PLAN OF CARE
Goal Outcome Evaluation:      Plan of Care Reviewed With: patient    Overall Patient Progress: no change    Outcome Evaluation: Pt remains vitally stable. Assist x2 w/ ceiling lift, incontinent of bowel & bladder. Pt is confused, bed alarm on. Daily wound cares completed today. Infrequent loose cough present. LLE and L) elbow skin tear, scattered bruising and scabbing present. Denies pain. R) PIV SL, regular diet, pt needs assistance with feeding. Takes pills in applesauce. Pt resting comfortably in between cares, call light within reach. Anticipate discharge to home w/ home care.

## 2023-12-17 NOTE — PLAN OF CARE
Pt alert, disoriented to situation and time.  C/o pain in L foot w/ movement.  R PIV SL.  Incontinent of bowel and bladder.  Infrequent loose cough present.

## 2023-12-17 NOTE — PLAN OF CARE
Goal Outcome Evaluation:      Plan of Care Reviewed With: patient    Overall Patient Progress: no changeOverall Patient Progress: no change    Outcome Evaluation: Congested cough in pt with covid. Slept most of the day. Fed for meals with eyes closed. Heavy assist of 2. Inc of bladder.

## 2023-12-17 NOTE — PROGRESS NOTES
"VS: /52 (BP Location: Right arm)   Pulse 62   Temp 98.3  F (36.8  C) (Oral)   Resp 16   Ht 1.702 m (5' 7.01\")   Wt 70.3 kg (155 lb)   SpO2 97%   BMI 24.27 kg/m       O2: SpO2>90% on room air, denies shortness of breath and chest pain   Output: Incontinent of urine, wearing brief   Last BM: 12/17/2023 overnight   Activity: Bedfast, lift for transfers   Skin: Redness/rash in abominal folds, abdomen , groin, and L chest. L foot cellulitis, dressing changed today. Scabs on R toes. Peeling skin on heels, dry/flaky skin on head and upper extremities. Scattered bruising.  Elbow abrasion, dsg changed today.    Pain: C/o lower abd pain, senna given.   CMS: Alert to self only   Dressing: L foot CDI, L elbow mepilex CDI   Diet: Regular, needs fed, denies nausea   LDA: R PIV SL   Equipment: IV pole, personal belongings, offloading boots   Plan: Home with continuation of PTA PCA cares with a shital lift being ordered for the home. He has covid restrictions until 12/21 and may discharge beginning this date.   Additional Info:  RN managed K today was 4.5. Crush pills in applesauce.     "

## 2023-12-17 NOTE — PROGRESS NOTES
[facility]    Medicine Progress Note - Hospitalist Service, GOLD TEAM 17    Date of Admission:  12/8/2023    Assessment & Plan   Noe Florence is an 89 yo man who has multiple medical problems including chronic kidney disease, atrial fibrillation, coronary artery disease with past CABG and past stent placement, past DVT, polymorphic ventricular tachycardia s/p ICD placement in May-2012, monoclonal gammopathy of unknown significance, polyneuropathy, ischemic cardiomyopathy, left common iliac aneurysm, polymyalgia rheumatica and past colon cancer. Patient presented on 08-Dec-2023 with swollen feet and wound infection. Patient reportedly had completed a course of antibiotic therapy but wounds had carmen worsening despite antibiotics.     Imaging is suggestive of left foot 2nd toe osteomyelitis.      From chart review, patient had been seen by Vascular Surgery on 15-Nov-2023. Patient was deemed to have nonsignificant peripheral vascular disease and great healing potential. It was felt that there was no role for further vascular imaging or intervention to improve healing potential. Patient was felt to be a high surgical risk for repair of left common iliac aneurysm which may be reaching a size of 3.5 cm.     Patient was confused on 09-Dec-2023 and was minimally responsive on 10-Dec-2023 but is more responsive today. This likely is from a metabolic encephalopathy. Patient was febrile yesterday morning and might have had early sepsis. Patient subsequently was found to be positive for Covid-19.    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   ---   Wound care per [facility].  ---   Patient to f/u with Podiatry as outpatient.  ---   Appreciate ID consult rec  ---   Failed outpt treatment w/ Amoxicillin  ---   S/p IV Vano and Zosyn, 12/8 - 12/13/23  ---   Currently on Augmentin and  doxycycline, started on 12/13 w/ plan to treat pt for 6-8 weeks    Asymptomatic COVID-19 infection  ---   Test positive on 12/10  ---   Will complete 3 days course of Remdesivir today  ---   Continue full barrier isolation x 10 days (12/10 - 12/21//23)    Acute metabolic encephalopathy  ---   Confusion is improving  ---   Monitor.    Atrial fibrillation   ---   Continue metoprolol for rate control  ---   Continue coumadin for stroke prophylaxis, INR is 1.95 today     CKD3  ---   Baseline creatinine appears to be 1.4-1.8.  ---   Cr was 1.29 today, w/in baseline range  ---   Avoiding nephrotoxins.     Polyneuropathy  ---   Patient to f/u with Neurology as outpatient.     Depression  ELVIN  ---   Continue PTA remeron, risperidal and zoloft      Osteoporosis  ---   Patient usually on fosamax.  ---   Further monitoring and management as outpatient.     Monoclonal gammopathy of unclear significance  ---   Further monitoring as outpatient.  ---   Per outpatient notes, patient is no longer seeing Oncology for this problem.     Left common iliac artery aneurysm  ---   Patient to f/u with Vascular Surgery as outpatient.  ---   Per outpatient Vascular Surgery note, possible repair if aneurysm > 4 cm.     History of colon cancer  ---   Further surveillance as outpatient.     History of DVT  ---   On coumadin and INR is therapeutic at 2.12 today.     H/o polymorphic Vtach  ---   S/p ICD placement in 2012  ---   Further monitoring as outpatient.     HTN  ---   Normotensive  ---   Continue PTA Metoprolol with hold parameters.          Diet: Regular Diet Adult  Room Service    Dominguez Catheter: Not present  Lines: None     Cardiac Monitoring: None  Code Status: Full Code    DISPOSITION:   Patient lives with his wife who was also diagnosed with COVID-19 on 12/13.  Patient's PCA unable to provide services until patient and patient's wife completes quarantine for COVID, thus may be here at least till 12/21/23            Phill House,  MD  Hospitalist Service, GOLD TEAM 17  [facility]  Securely message with [employer] (more info)  Text page via [facility] Paging/Directory   See signed in provider for up to date coverage information  ______________________________________________________________________    Interval History   No complaints  Uneventful night  Has a bedside sitter  Awaiting to complete full barrier isolation      Physical Exam   Vital Signs: Temp: 98.1  F (36.7  C) Temp src: Oral BP: (!) 144/59 Pulse: 59   Resp: 16 SpO2: 97 % O2 Device: None (Room air)    Weight: 155 lbs 0 oz  General: Awake, alert, follows command, NAD.  HEENT:  NC/AT, PERRL, EOMI, neck supple,  CVS:  NL s 1 and s2, no m/r/g.  Lungs:  CTA B/L.   Abd:  Soft, + bs  Ext:  Left foot dressing c/d/i  Lymph:  No edema.  Neuro:  Nonfocal.  Musculoskeletal: No calf tenderness to palpation.    Skin:  No rash.  Psychiatry:  Mood and affect appropriate.

## 2023-12-17 NOTE — PLAN OF CARE
Goal Outcome Evaluation:      Plan of Care Reviewed With: patient      VS: Temp: 98.1  F (36.7  C) Temp src: Oral BP: (!) 144/59 Pulse: 59   Resp: 16 SpO2: 97 % O2 Device: None (Room air)    Covid positive on 12/10   O2: SpO2>90% on room air, denies shortness of breath and chest pain   Output: Incontinent of urine, wearing brief   Last BM: 12/13/2023   Activity: Bedfast, lift for transfers   Skin: Redness/rash in abominal folds, abdomen and L chest. L foot cellulitis, dressing changed today. Scabs on R toes. Peeling skin on heels, dry/flaky skin on head and upper extremities. Scattered bruising.  Elbow abrasion.    Pain: denies   CMS: Alert to self only   Dressing: L foot CDI, L elbow mepilex CDI   Diet: Regular, denies nausea   LDA: R PIV SL   Equipment: IV pole, personal belongings, offloading boots   Plan: Discharge to TCU, pending placement   Additional Info:

## 2023-12-18 NOTE — PROGRESS NOTES
Care Management Follow Up    Length of Stay (days): 10    Expected Discharge Date: 12/21/2023     Concerns to be Addressed: discharge planning     Patient plan of care discussed at interdisciplinary rounds: Yes    Anticipated Discharge Disposition: Home Care, DME     Anticipated Discharge Services: PCA  Anticipated Discharge DME: Other (see comment) ([employer])    Patient/family educated on Medicare website which has current facility and service quality ratings: yes  Education Provided on the Discharge Plan: Yes  Patient/Family in Agreement with the Plan: yes    Referrals Placed by CM/SW: Homecare  Private pay costs discussed: Not applicable    Additional Information:  [facility] said they never received orders for [employer] [facility]. Orders re-faxed to [facility]. Anticipate patient to discharge to home once off of covid precautions, so his PCA services will be able to restart. [facility] will will continue to follow.    Sudha Brown RN, BSN  Care Coordinator, [facility] Orth[facility]  Phone [phone]  Pager [phone]

## 2023-12-18 NOTE — PLAN OF CARE
"  VS: /60   Pulse 55   Temp 97.9  F (36.6  C)   Resp 16   Ht 1.702 m (5' 7.01\")   Wt 70.3 kg (155 lb)   SpO2 98%   BMI 24.27 kg/m       O2: Room air   Output: Incontinent of bowel and bladder   Last BM: 12/18/23   Activity: Lift, assist of 2   Skin: Wounds to bilat LE toes, Right elbow   Pain: Denies at rest   CMS: Polyneuropathy, LE pain   Dressing: Bilat le and right elbow   Diet: Regular, staff to feed.   LDA: R PIV SL   Equipment: Lift, continuous O2 monitoring   Plan: To discharge home with services when medically stable.   Additional Info: Patient alert. Able to alert staff of need for bedpan. Confused to place and time. Medications given crushed in applesauce up in chair per PT                             "

## 2023-12-18 NOTE — PROGRESS NOTES
[facility]    Medicine Progress Note - Hospitalist Service, GOLD TEAM 17    Date of Admission:  12/8/2023    Assessment & Plan   Noe Florence is an 89 yo man who has multiple medical problems including chronic kidney disease, atrial fibrillation, coronary artery disease with past CABG and past stent placement, past DVT, polymorphic ventricular tachycardia s/p ICD placement in May-2012, monoclonal gammopathy of unknown significance, polyneuropathy, ischemic cardiomyopathy, left common iliac aneurysm, polymyalgia rheumatica and past colon cancer. Patient presented on 08-Dec-2023 with swollen feet and wound infection. Patient reportedly had completed a course of antibiotic therapy but wounds had carmen worsening despite antibiotics.     Imaging is suggestive of left foot 2nd toe osteomyelitis.      From chart review, patient had been seen by Vascular Surgery on 15-Nov-2023. Patient was deemed to have nonsignificant peripheral vascular disease and great healing potential. It was felt that there was no role for further vascular imaging or intervention to improve healing potential. Patient was felt to be a high surgical risk for repair of left common iliac aneurysm which may be reaching a size of 3.5 cm.     Patient was confused on 09-Dec-2023 and was minimally responsive on 10-Dec-2023 but is more responsive today. This likely is from a metabolic encephalopathy. Patient was febrile yesterday morning and might have had early sepsis. Patient subsequently was found to be positive for Covid-19.    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   ---   Wound care per [facility].  ---   Patient to f/u with Podiatry as outpatient.  ---   Appreciate ID consult rec  ---   Failed outpt treatment w/ Amoxicillin  ---   S/p IV Vano and Zosyn, 12/8 - 12/13/23  ---   Currently on Augmentin and  doxycycline, started on 12/13 w/ plan to treat pt for 6-8 weeks    Asymptomatic COVID-19 infection  ---   Test positive on 12/10  ---   Will complete 3 days course of Remdesivir today  ---   Continue full barrier isolation x 10 days (12/10 - 12/21//23)    Acute metabolic encephalopathy  ---   Confusion is improving  ---   Monitor.    Atrial fibrillation   ---   Continue metoprolol for rate control  ---   Continue coumadin for stroke prophylaxis, INR is 1.95 today     CKD3  ---   Baseline creatinine appears to be 1.4-1.8.  ---   Cr was 1.29 on 12/14, w/in baseline range  ---   Avoiding nephrotoxins.     Polyneuropathy  ---   Patient to f/u with Neurology as outpatient.     Depression  ELVIN  ---   Continue PTA remeron, risperidal and zoloft      Osteoporosis  ---   Patient usually on fosamax.  ---   Further monitoring and management as outpatient.     Monoclonal gammopathy of unclear significance  ---   Further monitoring as outpatient.  ---   Per outpatient notes, patient is no longer seeing Oncology for this problem.     Left common iliac artery aneurysm  ---   Patient to f/u with Vascular Surgery as outpatient.  ---   Per outpatient Vascular Surgery note, possible repair if aneurysm > 4 cm.     History of colon cancer  ---   Further surveillance as outpatient.     History of DVT  ---   On coumadin and INR is therapeutic at 2.23 today.     H/o polymorphic Vtach  ---   S/p ICD placement in 2012  ---   Further monitoring as outpatient.     HTN  ---   Normotensive  ---   Continue PTA Metoprolol with hold parameters.    Constipation  ---   Start Senna-s, Miralax and Dulcolax pr      Diet: Regular Diet Adult  Room Service    Dominguez Catheter: Not present  Lines: None     Cardiac Monitoring: None  Code Status: Full Code    DISPOSITION:   Patient lives with his wife who was also diagnosed with COVID-19 on 12/13.  Patient's PCA unable to provide services until patient and patient's wife completes quarantine for COVID, thus may  be here at least till 12/21/23            Phill House MD  Hospitalist Service, GOLD TEAM 17  [facility]  Securely message with [employer] (more info)  Text page via [employer] Paging/Directory   See signed in provider for up to date coverage information  ______________________________________________________________________    Interval History   C/o constipation, abd feels distended and uncomfortable  Uneventful night  Has a bedside sitter  Awaiting to complete full barrier isolation      Physical Exam   Vital Signs: Temp: 97.9  F (36.6  C) Temp src: Oral BP: 122/60 Pulse: 55   Resp: 16 SpO2: 98 % O2 Device: None (Room air)    Weight: 155 lbs 0 oz  General: Awake, alert, follows command, NAD.  HEENT:  NC/AT, PERRL, EOMI, neck supple,  CVS:  NL s 1 and s2, no m/r/g.  Lungs:  CTA B/L.   Abd:  Soft, + bs  Ext:  Left foot dressing c/d/i  Lymph:  No edema.  Neuro:  Nonfocal.  Musculoskeletal: No calf tenderness to palpation.    Skin:  No rash.  Psychiatry:  Mood and affect appropriate.

## 2023-12-18 NOTE — PLAN OF CARE
Pt A&O to self. VSS. Afebrile. 02 sats in the 90s on RA. Lungs clear but diminished. Denies SOB, CP or nausea. Tolerating regular diet. Bowel sound active in all quadrants. No BM but passing gas. Incontinent of bladder. Pain well managed with current pain regimen. CMS intact, denies N/T. Left foot dressing clean, dry and intact. PIV patent and SL. Pt slept...able to make needs known, call light with in reach. Will continue to monitor.

## 2023-12-18 NOTE — TELEPHONE ENCOUNTER
Shane called from [facility] to advise patient was supposed to have his INR checked today but he is currently in the hospital.    LUANN TillmanN, RN  Anticoagulation Clinic

## 2023-12-19 NOTE — PROGRESS NOTES
[facility]    Medicine Progress Note - Hospitalist Service, GOLD TEAM 17    Date of Admission:  12/8/2023    Assessment & Plan   Noe Florence is an 89 yo man who has multiple medical problems including chronic kidney disease, atrial fibrillation, coronary artery disease with past CABG and past stent placement, past DVT, polymorphic ventricular tachycardia s/p ICD placement in May-2012, monoclonal gammopathy of unknown significance, polyneuropathy, ischemic cardiomyopathy, left common iliac aneurysm, polymyalgia rheumatica and past colon cancer. Patient presented on 08-Dec-2023 with swollen feet and wound infection. Patient reportedly had completed a course of antibiotic therapy but wounds had carmen worsening despite antibiotics.     Imaging is suggestive of left foot 2nd toe osteomyelitis.      From chart review, patient had been seen by Vascular Surgery on 15-Nov-2023. Patient was deemed to have nonsignificant peripheral vascular disease and great healing potential. It was felt that there was no role for further vascular imaging or intervention to improve healing potential. Patient was felt to be a high surgical risk for repair of left common iliac aneurysm which may be reaching a size of 3.5 cm.     Patient was confused on 09-Dec-2023 and was minimally responsive on 10-Dec-2023 but is more responsive today. This likely is from a metabolic encephalopathy. Patient was febrile yesterday morning and might have had early sepsis. Patient subsequently was found to be positive for Covid-19.    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   ---   Wound care per [facility].  ---   Patient to f/u with Podiatry as outpatient.  ---   Appreciate ID consult rec  ---   Failed outpt treatment w/ Amoxicillin  ---   S/p IV Vano and Zosyn, 12/8 - 12/13/23  ---   Currently on Augmentin and  doxycycline, started on 12/13 w/ plan to treat pt for 6-8 weeks    Asymptomatic COVID-19 infection  ---   Test positive on 12/10  ---   Will complete 3 days course of Remdesivir today  ---   Continue full barrier isolation x 10 days (12/10 - 12/21//23)    Acute metabolic encephalopathy  ---   Confusion is improving  ---   Monitor.    Atrial fibrillation   ---   Continue metoprolol for rate control  ---   Continue coumadin for stroke prophylaxis, INR is 1.95 today     CKD3  ---   Baseline creatinine appears to be 1.4-1.8.  ---   Cr was 1.29 on 12/14, w/in baseline range  ---   Avoiding nephrotoxins.     Polyneuropathy  ---   Patient to f/u with Neurology as outpatient.     Depression  ELVIN  ---   Continue PTA remeron, risperidal and zoloft      Osteoporosis  ---   Patient usually on fosamax.  ---   Further monitoring and management as outpatient.     Monoclonal gammopathy of unclear significance  ---   Further monitoring as outpatient.  ---   Per outpatient notes, patient is no longer seeing Oncology for this problem.     Left common iliac artery aneurysm  ---   Patient to f/u with Vascular Surgery as outpatient.  ---   Per outpatient Vascular Surgery note, possible repair if aneurysm > 4 cm.     History of colon cancer  ---   Further surveillance as outpatient.     History of DVT  ---   On coumadin and INR is therapeutic at 2.23 today.     H/o polymorphic Vtach  ---   S/p ICD placement in 2012  ---   Further monitoring as outpatient.     HTN  ---   Normotensive  ---   Continue PTA Metoprolol with hold parameters.    Constipation  ---   Start Senna-s, Miralax and Dulcolax pr      Diet: Regular Diet Adult  Room Service    Dominguez Catheter: Not present  Lines: None     Cardiac Monitoring: None  Code Status: Full Code    DISPOSITION:   Patient lives with his wife who was also diagnosed with COVID-19 on 12/13.  Patient's PCA unable to provide services until patient and patient's wife completes quarantine for COVID, thus may  be here at least till 12/21/23            THU LINCOLN MD  Hospitalist Service, GOLD TEAM 17  [facility]  Securely message with [employer] (more info)  Text page via [employer] Paging/Directory   See signed in provider for up to date coverage information  ______________________________________________________________________    Interval History   Afebrile and HDS  Not on supplemental O2       Physical Exam   Vital Signs: Temp: 98.6  F (37  C) Temp src: Oral BP: 118/62 Pulse: 65   Resp: 16 SpO2: 98 % O2 Device: None (Room air)    Weight: 155 lbs 0 oz  General: Awake, alert, follows command, NAD.  HEENT:  NC/AT, PERRL, EOMI, neck supple,  CVS:  NL s 1 and s2, no m/r/g.  Lungs:  CTA B/L.   Abd:  Soft, + bs  Ext:  Left foot dressing c/d/i  Lymph:  No edema.  Neuro:  Nonfocal.  Musculoskeletal: No calf tenderness to palpation.    Skin:  No rash.  Psychiatry:  Confused, normal affect

## 2023-12-19 NOTE — PLAN OF CARE
"  VS: BP (!) 143/66   Pulse 71   Temp 98.2  F (36.8  C)   Resp 16   Ht 1.702 m (5' 7.01\")   Wt 70.3 kg (155 lb)   SpO2 97%   BMI 24.27 kg/m       O2: Room air   Output: incontinent   Last BM: 12/19/23   Activity: Bed/chairfast   Skin: Wounds to toes LLE, LUE skin tear   Pain: denies   CMS:    Dressing: LE dressing changed today, LUE changed yesterday   Diet: Regular, intake good   LDA:  R PIV SL   Equipment: [place] boots, lift, air mattress, continuous oximetry   Plan: Continue cares, discharge home with cares when stable   Additional Info: Patient alert with confusion. Positioned regularly. Rooke boots on. Medications given crushed in applesauce.                              "

## 2023-12-19 NOTE — PLAN OF CARE
Patient alert and oriented. Slept through this shift. Denied pain or discomfort. Able to make needs known. Turn and reposition couple times during shift. Incontinent of bowel and bladder. Incontinent cares provided. Right PIV slow to flush. Special precaution maintained this shift. Currently resting in bed with no acute distress noted. Call light within reach. Continue with plan of care.

## 2023-12-19 NOTE — PROGRESS NOTES
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 Admitted with R 2nd toe wound and c/f osteomyelitis.      Infected L 2nd toe wound with surrounding cellulitis, and concern for osteomyelitis   Hx of neuropathy 2/2 MGUS   Long history of lower extremity wounds. Evaluated 11/15 in Vascular clinic, CTA imaging w/o stenosis, and ABIs adequate thus not felt to have significant PAD causing wounds. 1mo history of poor healing wound R second toe, with extension into webspace between 1st and 2nd toes. No DM history, though A1c checked years ago. Following in podiatry clinic, 2 week history of progression and surrounding cellulitis. XR in ED w/ ? Of osteomyelitis. Based on work up thus far suspect wound is secondary to neuropathy and pressure ulcer. Requires further IV abx, and imaging to rule out underlying osteomyelitis.   - Cont IV Vancomycin   - Follow blood cultures   - Obtain MRSA swab   - Podiatry consult  - Attempted to obtain MRI, unable to get tonight because needs ICD clearance first (though per review suspect ICD should be compatible). Per d/w MRI desk may not be able to get until early next week. Will obtain CT scan of L foot tonight  - Wound care consult   - For pain: PTA tylenol 1000mg BID   - Check A1C     Assessment:      Areas visualized during today's visit:  left elbow    Wound location: left elbow     Last photo: 12/13 and 12/19  Wound due to: Skin Tear  Wound history/plan of care: prior  distal humerus fx early 2019(per chart review)  Wound base: 50 % epidermis and superficial scab     Palpation of the wound bed: normal      Drainage: small     Description of drainage: bloody and dried     Measurements (length x width x depth, in cm): 0.5  x 1.5  x  0.1 cm      Tunneling: N/A     Undermining: N/A  Periwound skin: Ecchymosis      Color: purple      Temperature: normal   Odor: none  Pain: no grimacing or signs of discomfort, none  Pain interventions prior to dressing change: N/A  Treatment goal: Heal , Decrease moisture, and Protection  STATUS: improving  Supplies ordered: supplies stored on unit    Wound location: bilateral toes     12/11: left foot toes       12/11: right foot toes    12/19 Left foot toes raised lesion      R foot toes 12/19    Last photo: 12/19/23  Wound due to: Pressure Injury and Diabetic Ulcer  Wound history/plan of care: See HPI  Wound base: Left foot 10/90 % granulation tissue and superficial scab, hypergranulation tissue right foot 100 % dermis and superficial scab     Palpation of the wound bed: normal      Drainage: small     Description of drainage: serosanguinous- left foot only     Measurements (length x width x depth, in cm): multiple toes- see photo L 2nd toe wound bed elevated above skin level about +0.6cm      Tunneling: N/A     Undermining: N/A  Periwound skin: Intact      Color: normal and consistent with surrounding tissue      Temperature: normal   Odor: none  Pain: denies , none  Pain interventions prior to dressing change: patient tolerated well and slow and gentle cares   Treatment goal: Heal , Infection control/prevention, Maintain (prevention of deterioration), and Protection  STATUS: evolving  Supplies ordered: ordered xeroform, discussed with RN, and discussed with patient and spouse     Treatment Plan:     Left elbow wound(s): Every other day cleanse with saline and pat dry. Apply vaseline gauze/ adaptic over wound. Cover with mepilex border dressing.       Left 1-3rd toe wound(s): Daily and PRN when soiled change ABD pad  Spray with wound cleanser and gently pat dry  Cut to fit Aquacel AG ([id number]) and apply over second toe wound,  Cover 1st and 3rd toe wounds with Xeroform gauze ([id number])  Cover with ABD pad folded over toes   Secure with 1/2 kerlix roll and tape (no tape directly on skin)     Right 2nd toe: Daily cleanse with saline and pat dry, cover open area with xeroform gauze, then secure with kerlix     Orders: Reviewed and Written    RECOMMEND PRIMARY TEAM ORDER: None, at this time  Education provided: plan of care and wound progress  Discussed plan of care with: Patient, Family, and Nurse  [facility] nurse follow-up plan: weekly  Notify WOC if wound(s) deteriorate.  Nursing to notify the Provider(s) and re-consult the [facility] Nurse if new skin concern.    DATA:     Current support surface: Standard  Standard gel/foam mattress (IsoFlex, Atmos air, etc)  Containment of urine/stool: Incontinent pad in bed  BMI: Body mass index is 24.27 kg/m .   Active diet order: Orders Placed This Encounter      Regular Diet Adult     Output: I/O last 3 completed shifts:  In: -   Out: 300 [Urine:300]     Labs:   Recent Labs   Lab 12/19/23  0728 12/15/23  0624 12/14/23  0557   HGB  --   --  8.4*   INR 1.95*   < > 2.12*   WBC  --   --  7.0    < > = values in this interval not displayed.     Pressure injury risk assessment:   Sensory Perception: 3-->slightly limited  Moisture: 3-->occasionally moist  Activity: 1-->bedfast  Mobility: 2-->very limited  Nutrition: 3-->adequate  Friction and Shear: 2-->potential problem  Chandler Score: 14      Pager no longer is use, please contact through PacketVideo   Carlos group: [facility] Nurse [facility] [place]  Dept. Office Number: *[phone]

## 2023-12-19 NOTE — PROGRESS NOTES
CLINICAL NUTRITION SERVICES    Reviewed nutrition risk factors due to LOS. Pt is tolerating diet, eating well per nursing documentation. No nutrition issues identified at this time. RD to follow at day 14. RD may be consulted if needs arise.     Renay Sexton MS, RDN, LD  RD pager: [phone], or Carlos Machuca  WB Weekend/Holiday Pager: [phone]

## 2023-12-19 NOTE — PLAN OF CARE
"Goal Outcome Evaluation:      Plan of Care Reviewed With: patient    Overall Patient Progress: improvingOverall Patient Progress: improving  VS: /58   Pulse 76   Temp 98  F (36.7  C) (Oral)   Resp 16   Ht 1.702 m (5' 7.01\")   Wt 70.3 kg (155 lb)   SpO2 98%   BMI 24.27 kg/m       O2: SpO2 > 92% and stable on RA. LS clear and equal bilaterally. Denies chest pain and SOB.    Output: Incontinence of urine, pt has brief on.   Last BM: LBM 12/18. Used bed pan for BM. denies abdominal discomfort.    Activity: A x2 with lift. Pt has been on a chair for dinner.    Skin: WDL except, blanchable redness on buttocks from moisture.    Pain: C/o pain in left foot, given scheduled tylenol.    CMS: Intact, AOx4. Denies numbness and tingling.   Dressing: L foot 2nd toe dressing CDI. Scabs on right toes.    Diet: Tolerating regular diet. Total feed.    LDA: PIV SL into right forearm.   Equipment: IV pole, personal belongings,    Plan: Discharge plan home with home care help when off special precaution. Special precautions maintained / Continue with plan of care. Call light within reach, pt able to make needs known.    Additional Info:                 "

## 2023-12-20 NOTE — PLAN OF CARE
Goal Outcome Evaluation:      Plan of Care Reviewed With: patient    Overall Patient Progress: improvingOverall Patient Progress: improving     Patient alert and oriented x2. Forgetful. VSS. Infrequent cough noted. Incontinent of bowel and bladder, LBM 12/19/23. Caryl care completed and brief on. Denies pain. Regular diet, needs assistance ordering meals and with feeding. Ax2 with ceiling lift and wit bed mobility. Call light within patient reach, bed alarm on. Continue with COVID precaution until 12/21.

## 2023-12-20 NOTE — PLAN OF CARE
"  VS: BP (!) 143/56   Pulse 77   Temp 98.5  F (36.9  C) (Axillary)   Resp 16   Ht 1.702 m (5' 7.01\")   Wt 70.3 kg (155 lb)   SpO2 98%   BMI 24.27 kg/m     O2: Room air   Output: incontinent   Last BM: 12/20/23   Activity: Lift for transfer   Skin: Wounds to bilat LE toes, right forearm   Pain: denies   CMS:    Dressing: Changed LE dressings today   Diet: Regular, feed   LDA: R PIV   Equipment: Iv pole, lift, continuous oximetry   Plan: Discharge home with cares   Additional Info: Patient sleeping much of day. Agreeable to cares. Eating well. Alert with confusion, easily redirected.                             "

## 2023-12-20 NOTE — PROGRESS NOTES
[facility]    Medicine Progress Note - Hospitalist Service, GOLD TEAM 17    Date of Admission:  12/8/2023    Assessment & Plan   Noe Florence is an 89 yo man who has multiple medical problems including chronic kidney disease, atrial fibrillation, coronary artery disease with past CABG and past stent placement, past DVT, polymorphic ventricular tachycardia s/p ICD placement in May-2012, monoclonal gammopathy of unknown significance, polyneuropathy, ischemic cardiomyopathy, left common iliac aneurysm, polymyalgia rheumatica and past colon cancer. Patient presented on 08-Dec-2023 with swollen feet and wound infection. Patient reportedly had completed a course of antibiotic therapy but wounds had carmen worsening despite antibiotics.     Imaging is suggestive of left foot 2nd toe osteomyelitis.      From chart review, patient had been seen by Vascular Surgery on 15-Nov-2023. Patient was deemed to have nonsignificant peripheral vascular disease and great healing potential. It was felt that there was no role for further vascular imaging or intervention to improve healing potential. Patient was felt to be a high surgical risk for repair of left common iliac aneurysm which may be reaching a size of 3.5 cm.     Patient was confused on 09-Dec-2023 and was minimally responsive on 10-Dec-2023 but is more responsive today. This likely is from a metabolic encephalopathy. Patient was febrile yesterday morning and might have had early sepsis. Patient subsequently was found to be positive for Covid-19.    Addendum: Patient with new ARSALAN on CKD likely due to hypovolemia.  Ordered  mL bolus.  Check BMP again tomorrow morning.    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   ---   Wound care per WCON.  ---   Patient to f/u with Podiatry as outpatient.  ---   Appreciate ID consult  rec  ---   Failed outpt treatment w/ Amoxicillin  ---   S/p IV Vano and Zosyn, 12/8 - 12/13/23  ---   Currently on Augmentin and doxycycline, started on 12/13 w/ plan to treat pt for 6-8 weeks; end date at least 1/20/2023.  (Already has an appointment with infectious disease on 1/31/2023)    # Asymptomatic COVID-19 infection  ---   Test positive on 12/10  ---   Will complete 3 days course of Remdesivir today  Continue full barrier isolation x 10 days; last day of diarrhea is 12/20/2030.  Patient can be discharged home on 12/21/2023.    # Acute metabolic encephalopathy  ---   Confusion is improving  ---   Monitor.    # Atrial fibrillation   ---   Continue metoprolol for rate control  ---   Continue coumadin for stroke prophylaxis.      # CKD3  ---   Baseline creatinine appears to be 1.4-1.8.  ---   Cr was 1.29 on 12/14, w/in baseline range  ---   Avoiding nephrotoxins.     Polyneuropathy  ---   Patient to f/u with Neurology as outpatient.     Depression  ELVIN  ---   Continue PTA remeron, risperidal and zoloft      Osteoporosis  ---   Patient usually on fosamax.  ---   Further monitoring and management as outpatient.     Monoclonal gammopathy of unclear significance  ---   Further monitoring as outpatient.  ---   Per outpatient notes, patient is no longer seeing Oncology for this problem.     Left common iliac artery aneurysm  ---   Patient to f/u with Vascular Surgery as outpatient.  ---   Per outpatient Vascular Surgery note, possible repair if aneurysm > 4 cm.     History of colon cancer  ---   Further surveillance as outpatient.     History of DVT  ---   On coumadin and INR is therapeutic at 2.23 today.     H/o polymorphic Vtach  ---   S/p ICD placement in 2012  ---   Further monitoring as outpatient.     HTN  ---   Normotensive  ---   Continue PTA Metoprolol with hold parameters.    Constipation  ---   Start Senna-s, Miralax and Dulcolax pr      Diet: Regular Diet Adult  Room Service    Dominguez Catheter: Not  present  Lines: None     Cardiac Monitoring: None  Code Status: Full Code    DISPOSITION:   Patient lives with his wife who was also diagnosed with COVID-19 on 12/13.  Patient's PCA unable to provide services until patient and patient's wife completes quarantine for COVID, thus may be here at least till 12/21/23            THU LINCOLN MD  Hospitalist Service, GOLD TEAM 17  [facility]  Securely message with [employer] (more info)  Text page via [facility] Paging/Directory   See signed in provider for up to date coverage information  ______________________________________________________________________    Interval History   Afebrile and HDS  Not on supplemental O2   Denies any acute complaints today    Physical Exam   Vital Signs: Temp: 98.5  F (36.9  C) Temp src: Oral BP: 95/56 Pulse: 85   Resp: 16 SpO2: 98 % O2 Device: None (Room air)    Weight: 155 lbs 0 oz  General: Awake, alert, follows command, NAD.  HEENT:  NC/AT, PERRL, EOMI, neck supple,  CVS:  NL s 1 and s2, no m/r/g.  Lungs:  CTA B/L.   Abd:  Soft, + bs  Ext:  Left foot dressing c/d/i  Lymph:  No edema.  Neuro:  Nonfocal.  Musculoskeletal: No calf tenderness to palpation.    Skin:  No rash.  Psychiatry:  Confused, normal affect

## 2023-12-20 NOTE — PHARMACY-ANTICOAGULATION SERVICE
Clinical Pharmacy- Warfarin Discharge Note  This patient is currently on warfarin for the treatment of Atrial fibrillation.  INR Goal= 2-3    Anticoagulation Dose History  More data exists         Latest Ref Rng & Units 12/14/2023 12/15/2023 12/16/2023 12/17/2023 12/18/2023 12/19/2023 12/20/2023   Recent Dosing and Labs   warfarin ANTICOAGULANT (COUMADIN) 1 MG tablet - - - - - 3.5 mg, $Given - -   warfarin ANTICOAGULANT (COUMADIN) 1 MG tablet - - 1 mg, $Given - - - - -   warfarin ANTICOAGULANT (COUMADIN) 2 MG tablet - - - - 2 mg, $Given - - -   warfarin ANTICOAGULANT (COUMADIN) 5 MG tablet - 5 mg, $Given - - - - 5 mg, $Given -   INR 0.85 - 1.15 2.12  2.82  3.10  2.62  2.23  1.95  2.02        Vitamin K doses administered during the last 7 days: None  FFP administered during the last 7 days: None  Recommend discharging the patient on a warfarin regimen of:  Thursday, 12/21/23: Give 2.5 mg by mouth  Friday, 12/22/23: Give 5 mg by mouth  Saturday, 12/23/23: Give 2.5 mg by mouth  Sunday, 12/24/23: Recheck INR, if not able, give 5 mg by mouth  Monday, 12/25/23: Recheck INR, if not done on previous day (12/24/23), give 2.5 mg by mouth  Tuesday, 12/26/23: Recheck INR, if not done on previous days (12/24/23-12/25/23), give 2.5 mg by mouth    Warfarin regimen with a prescription for warfarin 5mg tablets.      Gil Hernandez, Pharm.D.  PGY1 Pharmacy Resident

## 2023-12-20 NOTE — PLAN OF CARE
Goal Outcome Evaluation:      Plan of Care Reviewed With: patient      VS: Temp: 97.7  F (36.5  C) Temp src: Axillary BP: (!) 156/80 Pulse: 95   Resp: 16 SpO2: 96 % O2 Device: None (Room air)       O2: SpO2>90% on room air, denies shortness of breath and chest pain   Output: Incontinent of urine, brief changed frequently   Last BM: 12/20/2023   Activity: Turns in bed with assist of 2, lift for transfers   Skin: Bilateral toe wounds, R forearm skin tear, dry peeling skin on head/scalp, blanchable redness on sacrum, scattered bruising   Pain: denies   CMS: Alert to self and place; disoriented to time and situation. Denies numbness and tingling   Dressing: BLE (toes) dressings changed today are CDI, R forearm mepilex is CDI (dressing change due tomorrow)   Diet: Regular diet, total feed. Denies nausea   LDA: R PIV SL   Equipment: IV pole, personal belongings, continuous pulseox   Plan: Discharge with home cares 12/21   Additional Info:

## 2023-12-20 NOTE — PROGRESS NOTES
Care Management Follow Up    Length of Stay (days): 12    Expected Discharge Date: 12/21/2023     Concerns to be Addressed: discharge planning     Patient plan of care discussed at interdisciplinary rounds: Yes    Anticipated Discharge Disposition: Home Care, DME     Anticipated Discharge Services:   [facility]  [address] [place] [address]  Phone [phone]  Fax [phone]    Anticipated Discharge DME: Other (see comment) ([employer])    [facility]  Ph: [phone]    Patient/family educated on Medicare website which has current facility and service quality ratings: yes  Education Provided on the Discharge Plan: Yes  Patient/Family in Agreement with the Plan: yes    Referrals Placed by CM/SW: Homecare  Private pay costs discussed: TBD    Additional Information:  Spoke with Ivy at [facility]. Plan is to have Lupillo Lift delivered to patient's home on 12/21 in the morning. Plan for patient to discharge to home in the afternoon. RNCC will continue to follow.    Update 1612: Transportation to home set up via  EMS wheelchair transport. Estimated  time, 5090-8366. Patient's wife will bring his transport wheelchair for home transport. Signed home care orders to be faxed prior to discharge on 12/21. No further discharge concerns at this time. RNCC available as needed.    Sudha Brown RN, BSN  Care Coordinator, [facility] Orth[facility]  Phone [phone]  Pager [phone]

## 2023-12-20 NOTE — PLAN OF CARE
Goal Outcome Evaluation:  Shift: 1900-0730      Plan of Care Reviewed With: patient    Overall Patient Progress: improving    Outcome Evaluation: Pt is A&O x3 disoriented to situation; forgetful. Pt denied pain this shift. Infrequent cough with diminished LS. In special & contact precautions for Covid & MRSA. Continue POC.    VS: Temp: 98.5  F (36.9  C) Temp src: Oral BP: 95/56 Pulse: 85   Resp: 16 SpO2: 98 % O2 Device: None (Room air)      O2: SpO2 > 95% and stable on RA. LS diminished and equal bilaterally. Denies chest pain and SOB.    Output: Incontinent B&B. Brief in place.   Last BM: 12/19/2023, denies abdominal discomfort.    Activity: Up with Ax2 with cares & using ceiling left. Not OOB this shift.   Skin: WDL except, WDL except, L foot , 3 toe wound. blanchable redness on buttocks. L  Elbow abrasion. Scabs to the right toes.     Pain: Pain managed with scheduled tylenol.   CMS: Intact, AOx4. Denies numbness and tingling.   Dressing: L foot 2nd toe dressing CDI    Diet: Regular diet. Denies nausea/vomiting.    LDA: R PIV SL.   Equipment: IV pole, personal belongings,    Plan: Anticipate discharged when off of Covid precaution. Special & contact precautions maintained / Continue with plan of care. Call light within reach, pt able to make needs known.

## 2023-12-21 NOTE — PLAN OF CARE
"  VS: /59 (BP Location: Left arm, Patient Position: Semi-Lund's, Cuff Size: Adult Regular)   Pulse 75   Temp 97.6  F (36.4  C) (Oral)   Resp 16   Ht 1.702 m (5' 7.01\")   Wt 70.3 kg (155 lb)   SpO2 100%   BMI 24.27 kg/m     O2: Room air   Output: incontinent   Last BM: 12/21/23   Activity: Lift, some assistance with turning   Skin: Several wounds, L arm, bilat LE toes   Pain: denies   CMS:    Dressing: All dressings changed today   Diet: Regular, feed, intake good   LDA: R PIV SL   Equipment: Lift, mattress, iv pole, rooke boots   Plan: Patient to discharge home with cares   Additional Info: Patient is alert and confused. Turned every 2 hours. Dressings changed today.      DISCHARGE SUMMARY    Pt discharging to: home with cares   Transportation: Transport company  AVS given and discussed: Pt was given AVS and pt states understanding of content. Pt has no further questions.   Stoplight Tool given and discussed: Yes, no further questions.  Medications given: Yes, discussed. No further questions.   Belongings returned: Yes, ensured all belongings packed and sent with pt. No items in security.   Comments: Escorted safely to elevators. Pt left at 2:39 PM                               "

## 2023-12-21 NOTE — PROGRESS NOTES
ANTICOAGULATION  MANAGEMENT: Discharge Review    Noe Florence chart reviewed for anticoagulation continuity of care    Hospital Admission on 12/8/23 to 12/21/23 for     Found to have left foot osteomyelitis currently on oral  antibiotics. Also did develop asymptomatic COVID-19  infection and completed isolation. Patient will follow-up with  infectious disease, PCP and podiatry following discharge.  Patient should obtain blood work next week Tuesday to  monitor INR and kidney function..    Discharge disposition: Home with Home Care    Results:    Recent labs: (last 7 days)     12/15/23  0624 12/16/23  0755 12/17/23  0852 12/18/23  0709 12/19/23  0728 12/20/23  0739 12/21/23  0556   INR 2.82* 3.10* 2.62* 2.23* 1.95* 2.02* 2.21*     Anticoagulation inpatient management:     Calendar is updated with dosing, 21mg week 1; 24.5mg week 2    Anticoagulation discharge instructions:     Warfarin dosing: home regimen continued   Bridging: No   INR goal change: No      Medication changes affecting anticoagulation: Yes:   START taking:  doxycycline hyclate (VIBRAMYCIN)  Pill   polyethylene glycol (MIRALAX)  CHANGE how you take:  amoxicillin-clavulanate (AUGMENTIN)  STOP taking:  cephALEXin 500 MG capsule (KEFLEX)    Additional factors affecting anticoagulation: Yes: Spoke to patient spouse today 12/21.  Scheduled patient for next INR on 12/26/23. Wounds may be impacting mobility.  Patient has home care referral updated at discharge.     PLAN       No adjustment to anticoagulation plan needed    Patient not contacted    Anticoagulation Calendar updated    Ciro Bajwa RN

## 2023-12-21 NOTE — PLAN OF CARE
Problem: Adult Inpatient Plan of Care  Goal: Absence of Hospital-Acquired Illness or Injury  Intervention: Identify and Manage Fall Risk  Recent Flowsheet Documentation  Taken 12/21/2023 0100 by America Slaughter RN  Safety Promotion/Fall Prevention:   increase visualization of patient   lighting adjusted  Intervention: Prevent Skin Injury  Recent Flowsheet Documentation  Taken 12/21/2023 0100 by America Slaughter RN  Skin Protection: adhesive use limited  Device Skin Pressure Protection: absorbent pad utilized/changed  Intervention: Prevent and Manage VTE (Venous Thromboembolism) Risk  Recent Flowsheet Documentation  Taken 12/21/2023 0100 by America Slaughter RN  VTE Prevention/Management: SCDs (sequential compression devices) off  Intervention: Prevent Infection  Recent Flowsheet Documentation  Taken 12/21/2023 0100 by America Slaughter RN  Infection Prevention:   single patient room provided   rest/sleep promoted   Goal Outcome Evaluation: reviewed with the pt    Received alert and oriented x2, with intermittent confusion, in room air - saturating well  Denies pain, N/V, numbness/tingling sensation  Wound dressings are intact  PIV at left arm, saline locked  Wears boots BL LE  Incontinence of B/B  Last BM - 12/21  Possible for discharge to home

## 2023-12-21 NOTE — PLAN OF CARE
Physical Therapy Discharge Summary    Reason for therapy discharge:    All goals and outcomes met, no further needs identified.  No further expectations of functional progress.    Progress towards therapy goal(s). See goals on Care Plan in [facility] electronic health record for goal details.  Goals met OH lift for transfers, family comfortable w/ this     Therapy recommendation(s):    Continued therapy is recommended.  Rationale/Recommendations:  continue via home PT for home safety evaluation.

## 2023-12-21 NOTE — DISCHARGE SUMMARY
[facility]  Hospitalist Discharge Summary      Date of Admission:  12/8/2023  Date of Discharge:  12/21/2023  Discharging Provider: TUH LINCOLN MD  Discharge Service: Hospitalist Service, GOLD TEAM 17    Discharge Diagnoses   # Left foot 2nd toe osteomyelitis   # Asymptomatic COVID-19 infection  # Acute metabolic encephalopathy  # Atrial fibrillation   # CKD3  # Possible ARSALAN on CKD  # Polyneuropathy  # Depression  # ELVIN  # Osteoporosis  # Monoclonal gammopathy of unclear significance  # Left common iliac artery aneurysm  # History of colon cancer  # History of DVT   # H/o polymorphic V-Tach  # HTN  # Constipation  # BPH    Clinically Significant Risk Factors          Follow-ups Needed After Discharge   Follow-up Appointments     Adult [facility] Follow-up and recommended labs and tests      Follow up with primary care provider, Roberto Sarmiento, within 7   days for hospital follow- up.  The following labs/tests are recommended:   INR, serum Creatinine.      Appointments on [place] and/or [facility] (with [facility] or [facility]   provider or service). Call [phone] if you haven't heard regarding   these appointments within 7 days of discharge.              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 Patient presented on 08-Dec-2023 with swollen feet and wound infection. Patient reportedly had completed a course of antibiotic therapy but wounds had been worsening despite antibiotics.     Imaging is  suggestive of left foot 2nd toe osteomyelitis.      From chart review, patient had been seen by Vascular Surgery on 15-Nov-2023. Patient was deemed to have nonsignificant peripheral vascular disease and great healing potential. It was felt that there was no role for further vascular imaging or intervention to improve healing potential. Patient was felt to be a high surgical risk for repair of left common iliac aneurysm which may be reaching a size of 3.5 cm.      Patient was confused on 09-Dec-2023 and was minimally responsive on 10-Dec-2023 but is more responsive today. This likely is from a metabolic encephalopathy. Patient was febrile yesterday morning and might have had early sepsis. Patient subsequently was found to be positive for Covid-19 but not symptomatic. Completed course of remdesivir, and did not require supplemental O2.        # Left foot 2nd toe osteomyelitis  -Per Orthopaedic service, no benefit to obtaining MRI at this time and no indication for surgical intervention.   -S/p IV vancomycin and Zosyn for 12/8/2023 - 12/13/2023  -Wound care per [facility].  - Daily and PRN when soiled change ABD pad  - Spray with wound cleanser and gently pat dry  - Cut to fit Aquacel AG ([id number]) and apply over second toe wound,  - Cover 1st and 3rd toe wounds with Xeroform gauze ([id number])  - Cover with ABD pad folded over toes   - Secure with 1/2 kerlix roll and tape (no tape directly on skin)   Patient to f/u with Podiatry as outpatient, Dr. Pal.   Appreciate ID consult rec  Currently on Augmentin 500/125 p.o. 3 times daily and doxycycline 100 mg twice daily, started on 12/13 w/ plan to treat pt for 6-8 weeks; end date at least 1/20/2023.  (Already has an appointment with infectious disease on 1/31/2023)  Also per ID, If future Cr get to be 1.7 or higher, would reduce amox/clav to 500/125 BID      # Asymptomatic COVID-19 infection  -Test positive on 12/10  -Completed 3 days course of Remdesivir   Completed full barrier  isolation x 10 days; last day  2/20/2030.  Patient can be discharged home on 12/21/2023.     # Acute metabolic encephalopathy  -Appears back at baseline mental status.      # Atrial fibrillation   Continue metoprolol 12.5 mg twice daily  Continue coumadin for stroke prophylaxis.  Patient can continue PTA coumadin dosing with 2.5 mg on Thursday, the day of discharge   Next INR check on 12/26/2023.     # CKD3  # Possible ARSALAN on CKD  -Baseline creatinine appears to be 1.4-1.8.  -Creatinine was 1.88 on 12/20; did receive  mL bolus.  Repeat creatinine check 1.58  Avoiding nephrotoxins.  Next BMP check for 12/26/23      # Polyneuropathy  -Patient to f/u with Neurology as outpatient.     # Depression  # ELVIN  Patient is to continue PTA remeron, risperidal and zoloft      # Osteoporosis  -Patient usually on fosamax.  -Further monitoring and management as outpatient.     # Monoclonal gammopathy of unclear significance  Further monitoring as outpatient.  -Per outpatient notes, patient is no longer seeing Oncology for this problem.     # Left common iliac artery aneurysm  Patient to f/u with Vascular Surgery as outpatient.  Per outpatient Vascular Surgery note, possible repair if aneurysm > 4 cm.     # History of colon cancer  ---   Further surveillance as outpatient.     # History of DVT  Patient to continue on coumadin with routine INR checks in the outpatient setting.     # H/o polymorphic Vtach  ---   S/p ICD placement in 2012  Management as described above     # HTN  - Normotensive after taking BP medications which include metoprolol 12.5 mg twice daily.     # Constipation  Continue bowel regimen as needed.     # BPH  Patient to continue PTA tamsulosin.    Consultations This Hospital Stay   PODIATRY IP CONSULT  WOUND OSTOMY CONTINENCE NURSE  IP CONSULT  PHARMACY TO DOSE WARFARIN  PHARMACY TO DOSE VANCO  PHYSICAL THERAPY ADULT IP CONSULT  OCCUPATIONAL THERAPY ADULT IP CONSULT  ORTHOPAEDIC SURGERY ADULT/PEDS IP  CONSULT  INFECTIOUS DISEASE [facility] ADULT IP CONSULT  WOUND OSTOMY CONTINENCE NURSE  IP CONSULT  SPEECH LANGUAGE PATH ADULT IP CONSULT  CARE MANAGEMENT / SOCIAL WORK IP CONSULT  WOUND OSTOMY CONTINENCE NURSE  IP CONSULT    Code Status   Full Code    Time Spent on this Encounter   ITHU MD, personally saw the patient today and spent greater than 30 minutes discharging this patient.       THU LINCOLN MD  [facility] MED SURG ORTHOPEDIC  [facility] [address]  Phone: [phone]  Fax: [phone]  ______________________________________________________________________    Physical Exam   Vital Signs: Temp: 97.6  F (36.4  C) Temp src: Oral BP: 131/59 Pulse: 75   Resp: 16 SpO2: 100 % O2 Device: None (Room air)    Weight: 155 lbs 0 oz    General Appearance: Lying comfortably in bed, on room air, in no acute distress of discomfort  HEENT: PERRL: EOMI; moist mucous membrane w/o lesions  Neck: No JVD  Pulmonary: Normal WOB, bibasilar crackles   CVS: Regular rhythm, no murmurs, rubs or gallops  GI: BS (+), soft nontender, no rebound or guarding   Extremities: left elbow wound in dressing, bilateral toes in dressing (see most recent images below)    Left Elbow     R foot toe    Left foot toe    Left foot toe  Skin: No rashes or lesions  Neurologic: A&O x3         Primary Care Physician   Roberto Sarmiento    Discharge Orders      INR     Basic metabolic panel  (Ca, Cl, CO2, Creat, Gluc, K, Na, BUN)     Home Care Referral      Adult Infectious Disease  Referral      Reason for your hospital stay    Found to have left foot osteomyelitis currently on oral antibiotics. Also did develop asymptomatic COVID-19 infection and completed isolation. Patient will follow-up with infectious disease, PCP and podiatry following discharge. Patient should obtain blood work next week Tuesday to monitor INR and kidney function.     Activity    Your activity upon discharge:  activity as tolerated     Adult [facility] Follow-up and recommended labs and tests    Follow up with primary care provider, Roberto Sarmiento, within 7 days for hospital follow- up.  The following labs/tests are recommended: INR, serum Creatinine.      Appointments on [place] and/or [facility] (with [facility] or [facility] provider or service). Call [phone] if you haven't heard regarding these appointments within 7 days of discharge.     Wound care    Left elbow wound(s):   - Every other day cleanse with saline and pat dry.   - Apply vaseline gauze/ adaptic over wound.   - Cover with mepilex border dressing.       Left 1-3rd toe wound(s):   - Daily and PRN when soiled change ABD pad  - Spray with wound cleanser and gently pat dry  - Cut to fit Aquacel AG ([id number]) and apply over second toe wound,  - Cover 1st and 3rd toe wounds with Xeroform gauze ([id number])  - Cover with ABD pad folded over toes   - Secure with 1/2 kerlix roll and tape (no tape directly on skin)      Right 2nd toe:   - Daily cleanse with saline and pat dry, cover open area with xeroform gauze, then secure with kerlix     Lupillo Lift Order    Lupillo Lift Documentation:   Patient is non-weight bearing: Yes.     I, the undersigned, certify that the above prescribed supplies are medically necessary for this patient and is both reasonable and necessary in reference to accepted standards of medical and necessary in reference to accepted standards of medical practice in the treatment of this patient's condition and is not prescribed as a convenience.     Diet    Follow this diet upon discharge: Orders Placed This Encounter      Room Service      Regular Diet Adult       Significant Results and Procedures   Most Recent 3 CBC's:  Recent Labs   Lab Test 12/14/23  0557 12/13/23  0713 12/12/23  0828   WBC 7.0 4.0 4.9   HGB 8.4* 7.7* 7.9*   MCV 98 97 95    148* 159     Most Recent 3 BMP's:  Recent Labs   Lab Test 12/21/23  0556 12/20/23  0750  12/19/23  0728 12/17/23  0852 12/14/23  0557    140  --   --  142   POTASSIUM 4.4 4.8 4.2   < > 4.1   CHLORIDE 106 106  --   --  114*   CO2 27 24  --   --  18*   BUN 45.1* 47.5*  --   --  23.4*   CR 1.58* 1.88*  --   --  1.29*   ANIONGAP 7 10  --   --  10   KEITH 9.2 9.0  --   --  8.3*   GLC 88 88  --   --  88    < > = values in this interval not displayed.     Most Recent 2 LFT's:  Recent Labs   Lab Test 12/10/23  0802 12/08/23  1311   AST 19 15   ALT 9 9   ALKPHOS 64 73   BILITOTAL 0.2 0.2     Most Recent 3 INR's:  Recent Labs   Lab Test 12/21/23  0556 12/20/23  0739 12/19/23  0728   INR 2.21* 2.02* 1.95*   ,   Results for orders placed or performed during the hospital encounter of 12/08/23   XR Foot Left G/E 3 Views    Narrative    3 views left foot radiographs 12/8/2023 1:35 PM    History: infection of toes, concern for osteomyelitis     Comparison: Right foot radiographs 2/28/2016    Findings:    Nonweightbearing AP, oblique, and lateral  views of the left foot were  obtained.     Exam is partially limited by overlapping digits and osteopenic  appearance of the bones. External marker placed at the level of the  third/fourth digit distal phalanges. No definite sclerotic or erosive  change to the third or fourth digit. Hammer toe deformities of the  second through fifth digits.    Soft tissue wound of the dorsum of the great toe at the level of the  interphalangeal joint. Irregularity and erosive change to the  phalangeal tuft of the first digit distal phalanx.    Lisfranc articulation alignment is congruent on this non-weight  bearing study.    Mild degenerative changes of first metatarsophalangeal joint. Achilles  tendon insertional enthesopathy.       Impression    Impression:    1. Irregularity and erosive change to the phalangeal tuft of the first  digit distal phalanx raising the concern for osteomyelitis however  this is not at the location of the external marker.  2. No definitive radiographic  evidence for osteomyelitis involving the  third/fourth digits. If continued clinical concern for osteomyelitis  MRI can be considered.    I have personally reviewed the examination and initial interpretation  and I agree with the findings.    JUAN LUIS YOUSIF MD (Joe)         SYSTEM ID:  [id number]   CT Foot Left w/o Contrast    Narrative    EXAM: CT FOOT LEFT W/O CONTRAST  LOCATION: [facility]  DATE: 12/8/2023    INDICATION: 88-year-old patient with a right 2nd toe infection.  COMPARISON: 12/08/2023 radiographs.  TECHNIQUE: Noncontrast. Axial, sagittal and coronal thin-section reconstruction. Dose reduction techniques were used.     FINDINGS:     BONES:  -There is focal bone demineralization and cortical resorption in the 2nd toe proximal phalanx head and middle phalanx.   -No fracture or joint dislocation.   -Moderate/advanced generalized bone demineralization.   -Multiple hammertoe deformities.    SOFT TISSUES:  -Soft tissue attenuation and probable ulceration at the dorsal margin of the 2nd toe.   -Subcutaneous thickening in the dorsal foot.   -Advanced muscle fatty atrophy.      Impression    IMPRESSION:  1.  CT findings highly suspicious for osteomyelitis of the 2nd toe proximal and middle phalanges.  2.  Soft tissue attenuation and probable ulceration at the dorsal margin of the 2nd toe.  3.  Subcutaneous edema in the dorsal foot, nonspecific.  4.  Moderate/advanced generalized bone demineralization.  5.  No fracture or joint dislocation.   XR Chest Port 1 View    Narrative    Exam: XR CHEST PORT 1 VIEW, 12/10/2023 11:52 AM    Indication: Fever, coarsened breath sounds; concern for pneumonia    Comparison: 9/18/2021 CT, 12/6/2023 CT    Findings:   Left chest wall ICD leads are intact and in stable position. Intact  median sternotomy wires with mediastinal surgical clips and mitral  valve prosthesis. Stable enlarged cardiac silhouette. The pulmonary  vasculature  is indistinct. No appreciable pleural effusion or  pneumothorax. Perihilar and basilar prominent mixed interstitial and  streaky airspace opacities. Low lung volumes. Mild asymmetric  elevation of the right hemidiaphragm.      Impression    Impression: Enlarged cardiac silhouette with low lung volumes and  perihilar and basilar predominant mixed interstitial and streaky  airspace opacities, favored to represent a combination of pulmonary  edema and atelectasis, though infection is not excluded. Similar  findings are seen in the lung bases on 12/6/2023 CT.    ELVI JEFFRIES DO         SYSTEM ID:  [id number]   XR Abdomen Port 1 View    Narrative    EXAMINATION:  XR ABDOMEN PORT 1 VIEW 12/11/2023 2:51 PM     COMPARISON: CT 12/6/2023.    HISTORY: Upper abdominal vs. lower chest pain.    TECHNIQUE: Frontal view of the abdomen.    FINDINGS: Partially visualized fixation hardware of the proximal left  femur. Advanced degenerative changes of lumbar spine with mild left  levoscoliosis. Scattered pelvic phleboliths and vascular  calcifications project over the pelvis. No abnormally dilated loops of  bowel.  No pneumatosis or portal venous gas.        Impression    IMPRESSION: Nonobstructive bowel gas pattern.    I have personally reviewed the examination and initial interpretation  and I agree with the findings.    DORIS BUSTAMANTE MD         SYSTEM ID:  [id number]   XR Chest Port 1 View    Narrative    EXAM: XR CHEST PORT 1 VIEW 12/11/2023 2:52 PM    DEMOGRAPHICS: 88 years Male    INDICATION: Lower chest vs. lower abdominal pain. Admitted for  concerning for foot osteomyelitis, concern for sepsis.    COMPARISON: Chest x-ray 12/10/2023. Chest and pelvis CT 9/18/2021.    TECHNIQUE: Single portable AP view of the chest.    FINDINGS:   Median sternotomy wires appear stable. Mediastinal surgical clips.  Mitral valve prosthesis. Left-sided implanted dual lead defibrillator.  Trachea is midline. Cardiac silhouette is mildly enlarged.  No  pneumothorax. No visualized pleural effusion. Unchanged perihilar and  basilar streaky/patchy opacities.      Impression    IMPRESSION:   1.  Unchanged perihilar and bibasilar streaky/patchy opacities.  2.  No pneumothorax.    I have personally reviewed the examination and initial interpretation  and I agree with the findings.    IVAN MARTIN MD         SYSTEM ID:  [id number]     *Note: Due to a large number of results and/or encounters for the requested time period, some results have not been displayed. A complete set of results can be found in Results Review.       Discharge Medications   Current Discharge Medication List        START taking these medications    Details   doxycycline hyclate (VIBRAMYCIN) 100 MG capsule Take 1 capsule (100 mg) by mouth every 12 hours  Qty: 60 capsule, Refills: 0    Associated Diagnoses: Toe osteomyelitis (H)      Misc. Devices (PILL ) MISC 1 each as needed (medication administration)  Qty: 1 each, Refills: 0    Associated Diagnoses: Infection due to 2019 novel coronavirus      polyethylene glycol (MIRALAX) 17 GM/Dose powder Take 17 g by mouth daily  Qty: 510 g, Refills: 0    Associated Diagnoses: Other constipation           CONTINUE these medications which have CHANGED    Details   amoxicillin-clavulanate (AUGMENTIN) 500-125 MG tablet Take 1 tablet by mouth 3 times daily  Qty: 90 tablet, Refills: 0    Associated Diagnoses: Toe osteomyelitis (H)      diclofenac (VOLTAREN) 1 % topical gel Apply 2 g topically 4 times daily as needed    Associated Diagnoses: Osteoarthritis of knee, unspecified laterality, unspecified osteoarthritis type      metoprolol tartrate (LOPRESSOR) 25 MG tablet Take 0.5 tablets (12.5 mg) by mouth 2 times daily    Associated Diagnoses: Coronary artery disease involving native heart without angina pectoris, unspecified vessel or lesion type           CONTINUE these medications which have NOT CHANGED    Details   acetaminophen (TYLENOL) 500 MG  tablet Take 1,000 mg by mouth 2 times daily And every day PRN      bacitracin 500 UNIT/GM ophthalmic ointment 1 application, topical, Once A Day, Apply to open areas on right great to and right second toe and cover with dressing daily (has pressure area to toes)      baclofen (LIORESAL) 10 MG tablet Take 1 tablet (10 mg) by mouth 3 times daily  Qty: 60 tablet, Refills: 0    Associated Diagnoses: Osteoarthritis of knee, unspecified laterality, unspecified osteoarthritis type; Bilateral leg pain      calcium carbonate 500 mg, elemental, (OSCAL;OYSTER SHELL CALCIUM) 500 MG tablet Take 1 tablet (500 mg) by mouth 2 times daily  Qty: 180 tablet, Refills: 3    Associated Diagnoses: Closed displaced subtrochanteric fracture of left femur (H)      cholecalciferol 1000 units TABS Take 1,000 Units by mouth 2 times daily      cyanocobalamin (VITAMIN B-12) 1000 MCG tablet Take 4 daily  Qty: 400 tablet, Refills: 3    Associated Diagnoses: High serum methylmalonate      Emollient (CERAVE SA RENEWING) LOTN 1 application, topical, Once A Day, Apply to torso      Emollient (CERAVE) CREA 1 application, topical, Once A Day, Apply to bilateral legs      ferrous sulfate (FE TABS) 325 (65 Fe) MG EC tablet Take 325 mg by mouth daily      !! ketoconazole (NIZORAL) 2 % external cream Apply to the feet 2 times a day until rash clears then 3-4 times a week for maintenance  Qty: 60 g, Refills: 11    Associated Diagnoses: Tinea pedis of both feet      ketoconazole (NIZORAL) 2 % external shampoo Apply to scalp daily as needed until flaking resolves  Qty: 120 mL, Refills: 1    Associated Diagnoses: Tinea pedis of both feet; Dermatitis, seborrheic; Tinea corporis      lactobacillus rhamnosus, GG, (CULTURELL) capsule Take 1 capsule by mouth 2 times daily      mirtazapine (REMERON) 15 MG tablet Take 15 mg by mouth At Bedtime.      Multiple Vitamins-Minerals (MULTIVITAMIN ADULT PO) Take 1 tablet by mouth every morning      risperiDONE (RISPERDAL) 0.5  MG tablet Take by mouth daily 0.5 mg QAM, 1 mg QHS      sennosides (SENOKOT) 8.6 MG tablet Take 1 tablet by mouth 2 times daily      sertraline (ZOLOFT) 100 MG tablet Take 200 mg by mouth every evening      tamsulosin (FLOMAX) 0.4 MG capsule Take 2 capsules (0.8 mg) by mouth daily  Qty: 180 capsule, Refills: 2    Associated Diagnoses: Urinary frequency      warfarin ANTICOAGULANT (JANTOVEN ANTICOAGULANT) 5 MG tablet TAKE ONE TABLET ( 5 MG ) SUNDAY, WEDNESDAY, & FRIDAY AND ONE-HALF TABLET ( 2.5 MG ) ALL OTHER DAYS  OR AS DIRECTED BY ACC TEAM  Qty: 64 tablet, Refills: 1    Associated Diagnoses: Chronic atrial fibrillation (H)      alendronate (FOSAMAX) 70 MG tablet Take 1 tablet (70 mg) by mouth every 7 days Take with a full glass of water and do not eat or lay down for 30 minutes  Qty: 12 tablet, Refills: 3    Associated Diagnoses: Age related osteoporosis, unspecified pathological fracture presence      COMPRESSION STOCKINGS 1 each daily  Qty: 1 each, Refills: 4    Associated Diagnoses: Chronic venous insufficiency      !! ketoconazole (NIZORAL) 2 % external cream Apply topically 2 times daily  Qty: 60 g, Refills: 1    Associated Diagnoses: Tinea corporis      urea (GORMEL) 20 % external cream Apply topically 2 times daily Apply to bilateral heels BID       !! - Potential duplicate medications found. Please discuss with provider.        STOP taking these medications       cephALEXin (KEFLEX) 500 MG capsule Comments:   Reason for Stopping:             Allergies   Allergies   Allergen Reactions    Latex Rash     Rash

## 2023-12-21 NOTE — TELEPHONE ENCOUNTER
[facility] Call Center    Phone Message    May a detailed message be left on voicemail: yes     Reason for Call: Other: Pt's spouse called and will need to r/s the 12/26 appt due to appt conflict. Please call them back. Thanks      Action Taken: Message routed to:  [facility] VAS    Travel Screening: Not Applicable

## 2023-12-26 NOTE — PROGRESS NOTES
ANTICOAGULATION MANAGEMENT     Noe Florence 88 year old male is on warfarin with supratherapeutic INR result. (Goal INR  1.5-2.5 )    Recent labs: (last 7 days)     12/26/23  1429   INR 2.61*       ASSESSMENT     Source(s): Chart Review and Patient/Caregiver Call     Warfarin doses taken: Warfarin taken as instructed  Diet: No new diet changes identified  Medication/supplement changes:  doxycycline started on 12/21 which may be increasing INR today - will be on for 6-8 weeks.   New illness, injury, or hospitalization: Yes: hospitalized 12/8-12/21 for L foot osteomyelitis. Stanislaw says patient is still pretty stressed from all the recent changes and hospitalization. Now using a shital lift for transfers due to wounds.   Signs or symptoms of bleeding or clotting: No  Previous result: Therapeutic last 2(+) visits   Additional findings: None       PLAN     Recommended plan for ongoing change(s) affecting INR     Dosing Instructions: decrease your warfarin dose (10% change) with next INR in 1 week       Summary  As of 12/26/2023      Full warfarin instructions:  5 mg every Mon, Fri; 2.5 mg all other days   Next INR check:  1/2/2024               Telephone call with stanislaw who agrees to plan and repeated back plan correctly    Orders given to [facility] ([phone])    Education provided:   Goal range and lab monitoring: goal range and significance of current result and Importance of therapeutic range  Interaction IS anticipated between warfarin and doxycycline    Plan made with [facility] Pharmacist Maddy Adkins RN  Anticoagulation Clinic  12/26/2023    _______________________________________________________________________     Anticoagulation Episode Summary       Current INR goal:  Other - see comment   TTR:  10.9% (11.6 mo)   Target end date:  Indefinite   Send INR reminders to:  [facility]    Indications    Atrial fibrillation (H) [I48.91] [I48.91]  Long-term (current) use of  anticoagulants [Z79.01] [Z79.01]  Deep vein thrombosis (DVT) (H) [I82.409]  Atrial fibrillation  unspecified type (H) [I48.91]  Chronic atrial fibrillation (H) [I48.20]             Comments:  Goal range: 1.5-2.5             Anticoagulation Care Providers       Provider Role Specialty Phone number    Roberto Sarmiento MD [facility] Internal Medicine [phone]

## 2023-12-26 NOTE — RESULT ENCOUNTER NOTE
You are adequately anti-coagulated.  Your  kidney function is stable.  The blood sugar, calcium and blood electrolytes are all normal.  If you have any questions regarding this don't hesitate to contact us.

## 2023-12-26 NOTE — PROGRESS NOTES
HPI  88-year-old send stay with his wife following hospital discharge.  He developed osteomyelitis in the left second toe was hospitalized for this for intravenous antibiotics discharged on doxycycline and amoxicillin with wound care clinic and infectious disease consultations pending.  He also developed a skin tear on his left elbow.  Since discharge she has been in considerable pain and discomfort which has been interfering with his quality life and his ability to participate in any type of rehabilitation although he is presently nonweightbearing on the left leg.  They have not gotten relief with ibuprofen or acetaminophen.  Interested in opioid therapy recognizing the risk for confusion disorientation falls and fracture.  Also requesting ear irrigation and wound care dressing of the left elbow.  Past Medical History:   Diagnosis Date    Advanced open-angle glaucoma     Atrial fibrillation (H)     CKD (chronic kidney disease), stage III (H) 2005    Colon cancer (H)     Stage II-B colon cancer    Coronary artery disease     s/p CABG x 2, JEREMY x 2    Diverticulitis     Hyperlipidemia     Hypertension     Ischemic cardiomyopathy     MGUS (monoclonal gammopathy of unknown significance)     Nonsenile cataract     BE    Osteoporosis     left hip fracture    Polymorphic ventricular tachycardia (H)     Polymyalgia rheumatica (H24)     PVD (posterior vitreous detachment), both eyes 2005    S/P ablation of atrial flutter 6/20/14    CTI    Stented coronary artery     SVT (supraventricular tachycardia)     PPM/AICD for NSVT    Upper leg DVT (deep venous thromboembolism), chronic (H)     Left    Weight loss, unintentional 2017    15 lb in 4 months     Past Surgical History:   Procedure Laterality Date    CATARACT IOL, RT/LT Bilateral     COLONOSCOPY  3/13/2014    Procedure: COMBINED COLONOSCOPY, SINGLE BIOPSY/POLYPECTOMY BY BIOPSY;;  Surgeon: Mary Gerber MD;  Location:  GI    COLONOSCOPY N/A 6/22/2015     Procedure: COLONOSCOPY;  Surgeon: Marilin Newman MD;  Location:  GI    COLONOSCOPY N/A 11/7/2018    Procedure: COMBINED COLONOSCOPY, SINGLE OR MULTIPLE BIOPSY/POLYPECTOMY BY BIOPSY;  Surgeon: Roberto Esteban MD;  Location:  GI    EP ICD GENERATOR REPLACEMENT DUAL N/A 12/11/2018    Procedure: EP ICD Generator Change Dual;  Surgeon: Deedee Baird MD;  Location:  HEART CARDIAC CATH LAB    H ABLATION ATRIAL FLUTTER      HEART CATH DRUG ELUTING STENT PLACEMENT  4/19/2012    JEREMY x 2 to LCx    IMPLANT IMPLANTABLE CARDIOVERTER DEFIBRILLATOR  5-    ppm/aicd    KNEE SURGERY      right and left knee surgeries    LAPAROSCOPIC ASSISTED COLECTOMY Right 2/1/2019    Procedure: Laparoscopic Converted to Open Transverse Colectomy with Lysis of Adhesions;  Surgeon: Chanelle Guzmán MD;  Location:  OR    LAPAROSCOPIC ASSISTED COLECTOMY LEFT (DESCENDING)  4/8/2014    Procedure: LAPAROSCOPIC ASSISTED COLECTOMY LEFT (DESCENDING);  Hand assisted Laparoscopic Sigmoid Colectomy , Laparoscopic mobilization of spleenic flexure *Latex Allergy*Anesthesia General with Block;  Surgeon: Chanelle Guzmán MD;  Location: [facility]    OPEN REDUCTION INTERNAL FIXATION RODDING INTRAMEDULLAR FEMUR FRACTURE TABLE Left 3/14/2019    Procedure: Open Reduction Internal Fixation Left Femur Intramedullar Nailing;  Surgeon: Srikanth Avalos MD;  Location:  OR    REPAIR VALVE MITRAL  2006     30-mm Medtronic Julian ring     SELECTIVE LASER TRABECULOPLASTY (SLT) OD (RIGHT EYE)  4/10, 1/12,+1/9/13    RE    SELECTIVE LASER TRABECULOPLASTY (SLT) OS (LEFT EYE)  5/2012    SHOULDER SURGERY      right rotator cuff    ZZC CABG, ARTERY-VEIN, FOUR  2006    LIMA-LAD, SVG-Rt PDA, SVG-OM2, SVG-Diag 1    ZZC CABG, VEIN, SINGLE  1994    1-vessel CABG, SVG->PDRCA      Family History   Problem Relation Age of Onset    C.A.D. Father     Anesthesia Reaction No family hx of     Crohn's Disease No family hx of     Ulcerative Colitis  No family hx of     Cancer - colorectal No family hx of     Macular Degeneration No family hx of     Cancer No family hx of         No known family hx of skin cancer    Melanoma No family hx of     Skin Cancer No family hx of          Exam:  /68 (BP Location: Right arm, Patient Position: Sitting, Cuff Size: Adult Regular)   Pulse 62   SpO2 98%   The patient is alert, oriented with a clear sensorium.   Skin shows superficial skin tear of the left elbow no evidence of infection  Head is normocephalic and atraumatic.    Ears show normal TM on the left mild cerumen on the right which will be irrigated  Lungs are clear.   Heart shows normal S1 and S2 without murmur or gallop.    Extremities show bilateral large built boots    Labs reviewed:  Results for orders placed or performed in visit on 12/26/23   Basic metabolic panel     Status: Abnormal   Result Value Ref Range    Sodium 142 135 - 145 mmol/L    Potassium 4.3 3.4 - 5.3 mmol/L    Chloride 108 (H) 98 - 107 mmol/L    Carbon Dioxide (CO2) 22 22 - 29 mmol/L    Anion Gap 12 7 - 15 mmol/L    Urea Nitrogen 61.4 (H) 8.0 - 23.0 mg/dL    Creatinine 1.85 (H) 0.67 - 1.17 mg/dL    GFR Estimate 35 (L) >60 mL/min/1.73m2    Calcium 9.6 8.8 - 10.2 mg/dL    Glucose 132 (H) 70 - 99 mg/dL   INR     Status: Abnormal   Result Value Ref Range    INR 2.61 (H) 0.85 - 1.15         ASSESSMENT  1 osteomyelitis left second toe  2 venous insufficiency with history  DVT  3 history colon cancer S/P resection 2019  4 hypertension well controlled  5 hyperlipidemia on atorvastatin  6 CKD 3b  7 anemia secondary above  8 atrial fibrillation on warfarin  9 peripheral arterial disease  10 monoclonal gammopathy stable  11 Hip fracture with nailing 3/14/2019  12 CAD S/P CABGx2 with JEREMY  13 V Tach S/P ICD  14 osteoporosis on alendronate since 5/2021  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  Will try codeine 30 mg for pain they are advised to the concerns regarding potential for confusion falls and fractures related to this.  Will reassess his labs and keep the scheduled follow-up visits and see me in another 2 to 3 months.        This note was completed using Dragon voice recognition software.      Roberto Sarmiento MD  General Internal Medicine  [facility]  [phone]

## 2023-12-28 NOTE — PROGRESS NOTES
Clinic Care Coordination Contact    Situation: Patient chart reviewed by care coordinator.    Background: Identified via Recently Discharged list.     Assessment: Patient had a visit in [facility] with PCP Dr. Sarmiento on 12/26.     Plan/Recommendations: RN will close program.     DIANDRA WALLIS RN on 12/28/2023 at 11:54 AM

## 2023-12-29 NOTE — PROGRESS NOTES
"Received VM from Rica wondering about dosing from INR of 2.61 on 12/26. Spoke with Nicolette who did not recall dosing adjustment from Tuesday. States patient took \"at least\" a full tablet on 12/27, and is unsure what was taken 12/28. Recommended partial dose of 2.5 mg today, and then continue current dose. Patient is scheduled for INR recheck 1/2 with in home labs.     Maria Del Carmen Adkins RN    "

## 2023-12-29 NOTE — TELEPHONE ENCOUNTER
Called [facility] and left a secure VM.  Gave verbal orders per Dr. Sarmiento for  delay of service for 12/29/23          Niranjan Galaviz CMA ([facility]) at 3:41 PM on 12/29/2023

## 2023-12-29 NOTE — TELEPHONE ENCOUNTER
[facility] Call Center    Phone Message    May a detailed message be left on voicemail: yes     Reason for Call: Other: Requires verbal orders for delay of service for 12/29/23     Action Taken: Other: pcc    Travel Screening: Not Applicable

## 2024-01-01 ENCOUNTER — DOCUMENTATION ONLY (OUTPATIENT)
Dept: CARE COORDINATION | Facility: CLINIC | Age: 89
End: 2024-01-01

## 2024-01-01 ENCOUNTER — MEDICAL CORRESPONDENCE (OUTPATIENT)
Dept: HEALTH INFORMATION MANAGEMENT | Facility: CLINIC | Age: 89
End: 2024-01-01

## 2024-01-01 ENCOUNTER — TELEPHONE (OUTPATIENT)
Dept: ANTICOAGULATION | Facility: CLINIC | Age: 89
End: 2024-01-01
Payer: COMMERCIAL

## 2024-01-01 ENCOUNTER — MEDICAL CORRESPONDENCE (OUTPATIENT)
Dept: HEALTH INFORMATION MANAGEMENT | Facility: CLINIC | Age: 89
End: 2024-01-01
Payer: COMMERCIAL

## 2024-01-01 ENCOUNTER — DOCUMENTATION ONLY (OUTPATIENT)
Dept: ANTICOAGULATION | Facility: CLINIC | Age: 89
End: 2024-01-01
Payer: COMMERCIAL

## 2024-01-01 ENCOUNTER — TRANSFERRED RECORDS (OUTPATIENT)
Dept: HEALTH INFORMATION MANAGEMENT | Facility: CLINIC | Age: 89
End: 2024-01-01
Payer: COMMERCIAL

## 2024-01-01 ENCOUNTER — ANTICOAGULATION THERAPY VISIT (OUTPATIENT)
Dept: ANTICOAGULATION | Facility: CLINIC | Age: 89
End: 2024-01-01
Payer: COMMERCIAL

## 2024-01-01 ENCOUNTER — TELEPHONE (OUTPATIENT)
Dept: VASCULAR SURGERY | Facility: CLINIC | Age: 89
End: 2024-01-01
Payer: COMMERCIAL

## 2024-01-01 ENCOUNTER — DOCUMENTATION ONLY (OUTPATIENT)
Dept: INTERNAL MEDICINE | Facility: CLINIC | Age: 89
End: 2024-01-01
Payer: COMMERCIAL

## 2024-01-01 ENCOUNTER — TELEPHONE (OUTPATIENT)
Dept: WOUND CARE | Facility: CLINIC | Age: 89
End: 2024-01-01
Payer: COMMERCIAL

## 2024-01-01 ENCOUNTER — ANCILLARY PROCEDURE (OUTPATIENT)
Dept: CARDIOLOGY | Facility: CLINIC | Age: 89
End: 2024-01-01
Attending: INTERNAL MEDICINE
Payer: COMMERCIAL

## 2024-01-01 ENCOUNTER — APPOINTMENT (OUTPATIENT)
Dept: GENERAL RADIOLOGY | Facility: CLINIC | Age: 89
DRG: 871 | End: 2024-01-01
Payer: OTHER MISCELLANEOUS

## 2024-01-01 ENCOUNTER — APPOINTMENT (OUTPATIENT)
Dept: ULTRASOUND IMAGING | Facility: CLINIC | Age: 89
DRG: 871 | End: 2024-01-01
Attending: NURSE PRACTITIONER
Payer: OTHER MISCELLANEOUS

## 2024-01-01 ENCOUNTER — OFFICE VISIT (OUTPATIENT)
Dept: CT IMAGING | Facility: CLINIC | Age: 89
End: 2024-01-01
Attending: INTERNAL MEDICINE
Payer: COMMERCIAL

## 2024-01-01 ENCOUNTER — APPOINTMENT (OUTPATIENT)
Dept: GENERAL RADIOLOGY | Facility: CLINIC | Age: 89
DRG: 871 | End: 2024-01-01
Attending: INTERNAL MEDICINE
Payer: OTHER MISCELLANEOUS

## 2024-01-01 ENCOUNTER — TELEPHONE (OUTPATIENT)
Dept: INFECTIOUS DISEASES | Facility: CLINIC | Age: 89
End: 2024-01-01
Payer: COMMERCIAL

## 2024-01-01 ENCOUNTER — HOSPITAL ENCOUNTER (INPATIENT)
Facility: CLINIC | Age: 89
LOS: 8 days | Discharge: HOSPICE | DRG: 871 | End: 2024-02-23
Attending: EMERGENCY MEDICINE | Admitting: INTERNAL MEDICINE
Payer: OTHER MISCELLANEOUS

## 2024-01-01 ENCOUNTER — ANTICOAGULATION THERAPY VISIT (OUTPATIENT)
Dept: ANTICOAGULATION | Facility: CLINIC | Age: 89
End: 2024-01-01

## 2024-01-01 ENCOUNTER — OFFICE VISIT (OUTPATIENT)
Dept: VASCULAR SURGERY | Facility: CLINIC | Age: 89
End: 2024-01-01
Payer: COMMERCIAL

## 2024-01-01 ENCOUNTER — HOSPITAL ENCOUNTER (INPATIENT)
Facility: CLINIC | Age: 89
LOS: 16 days | End: 2024-03-10
Attending: ANESTHESIOLOGY | Admitting: INTERNAL MEDICINE
Payer: MEDICARE

## 2024-01-01 ENCOUNTER — APPOINTMENT (OUTPATIENT)
Dept: GENERAL RADIOLOGY | Facility: CLINIC | Age: 89
DRG: 871 | End: 2024-01-01
Attending: EMERGENCY MEDICINE
Payer: OTHER MISCELLANEOUS

## 2024-01-01 ENCOUNTER — TRANSFERRED RECORDS (OUTPATIENT)
Dept: HEALTH INFORMATION MANAGEMENT | Facility: CLINIC | Age: 89
End: 2024-01-01

## 2024-01-01 ENCOUNTER — APPOINTMENT (OUTPATIENT)
Dept: CT IMAGING | Facility: CLINIC | Age: 89
DRG: 871 | End: 2024-01-01
Attending: EMERGENCY MEDICINE
Payer: OTHER MISCELLANEOUS

## 2024-01-01 ENCOUNTER — TELEPHONE (OUTPATIENT)
Dept: ANTICOAGULATION | Facility: CLINIC | Age: 89
End: 2024-01-01

## 2024-01-01 ENCOUNTER — VIRTUAL VISIT (OUTPATIENT)
Dept: INTERNAL MEDICINE | Facility: CLINIC | Age: 89
End: 2024-01-01
Payer: COMMERCIAL

## 2024-01-01 ENCOUNTER — APPOINTMENT (OUTPATIENT)
Dept: CARDIOLOGY | Facility: CLINIC | Age: 89
DRG: 871 | End: 2024-01-01
Attending: EMERGENCY MEDICINE
Payer: OTHER MISCELLANEOUS

## 2024-01-01 VITALS
OXYGEN SATURATION: 98 % | TEMPERATURE: 97.4 F | DIASTOLIC BLOOD PRESSURE: 75 MMHG | SYSTOLIC BLOOD PRESSURE: 136 MMHG | HEART RATE: 63 BPM

## 2024-01-01 VITALS — RESPIRATION RATE: 12 BRPM | HEART RATE: 60 BPM | OXYGEN SATURATION: 99 %

## 2024-01-01 VITALS
OXYGEN SATURATION: 98 % | SYSTOLIC BLOOD PRESSURE: 116 MMHG | BODY MASS INDEX: 23.6 KG/M2 | WEIGHT: 150.35 LBS | DIASTOLIC BLOOD PRESSURE: 69 MMHG | HEIGHT: 67 IN | HEART RATE: 60 BPM | RESPIRATION RATE: 19 BRPM | TEMPERATURE: 99.3 F

## 2024-01-01 VITALS — HEART RATE: 92 BPM | OXYGEN SATURATION: 91 % | SYSTOLIC BLOOD PRESSURE: 119 MMHG | DIASTOLIC BLOOD PRESSURE: 58 MMHG

## 2024-01-01 DIAGNOSIS — E87.0 HYPERNATREMIA: ICD-10-CM

## 2024-01-01 DIAGNOSIS — I48.91 ATRIAL FIBRILLATION, UNSPECIFIED TYPE (H): Primary | ICD-10-CM

## 2024-01-01 DIAGNOSIS — I72.3 ILIAC ANEURYSM (H): Primary | ICD-10-CM

## 2024-01-01 DIAGNOSIS — R09.02 HYPOXIA: ICD-10-CM

## 2024-01-01 DIAGNOSIS — L89.020 PRESSURE INJURY OF LEFT ELBOW, UNSTAGEABLE (H): ICD-10-CM

## 2024-01-01 DIAGNOSIS — I48.20 CHRONIC ATRIAL FIBRILLATION (H): ICD-10-CM

## 2024-01-01 DIAGNOSIS — Z95.810 ICD (IMPLANTABLE CARDIOVERTER-DEFIBRILLATOR) IN PLACE: ICD-10-CM

## 2024-01-01 DIAGNOSIS — R53.83 LETHARGY: ICD-10-CM

## 2024-01-01 DIAGNOSIS — I48.91 ATRIAL FIBRILLATION, UNSPECIFIED TYPE (H): ICD-10-CM

## 2024-01-01 DIAGNOSIS — I48.0 PAROXYSMAL ATRIAL FIBRILLATION (H): Primary | ICD-10-CM

## 2024-01-01 DIAGNOSIS — N17.9 ACUTE KIDNEY INJURY (H): ICD-10-CM

## 2024-01-01 DIAGNOSIS — R46.4 SLOWNESS AND POOR RESPONSIVENESS: ICD-10-CM

## 2024-01-01 DIAGNOSIS — I21.4 NON-ST ELEVATION MYOCARDIAL INFARCTION (NSTEMI) (H): Primary | ICD-10-CM

## 2024-01-01 DIAGNOSIS — I82.409 DEEP VEIN THROMBOSIS (DVT) (H): ICD-10-CM

## 2024-01-01 DIAGNOSIS — I87.8 VENOUS STASIS: ICD-10-CM

## 2024-01-01 DIAGNOSIS — Z53.9 DIAGNOSIS NOT YET DEFINED: Primary | ICD-10-CM

## 2024-01-01 DIAGNOSIS — Z79.01 LONG TERM CURRENT USE OF ANTICOAGULANT THERAPY: ICD-10-CM

## 2024-01-01 DIAGNOSIS — R62.7 FAILURE TO THRIVE IN ADULT: ICD-10-CM

## 2024-01-01 DIAGNOSIS — M86.9 TOE OSTEOMYELITIS (H): ICD-10-CM

## 2024-01-01 DIAGNOSIS — M86.172 OTHER ACUTE OSTEOMYELITIS OF LEFT FOOT (H): Primary | ICD-10-CM

## 2024-01-01 DIAGNOSIS — I47.20 VENTRICULAR TACHYARRHYTHMIA (H): ICD-10-CM

## 2024-01-01 DIAGNOSIS — R34 ANURIA: Primary | ICD-10-CM

## 2024-01-01 DIAGNOSIS — I21.4 NSTEMI (NON-ST ELEVATED MYOCARDIAL INFARCTION) (H): ICD-10-CM

## 2024-01-01 LAB
ABO/RH(D): NORMAL
ALBUMIN SERPL BCG-MCNC: 2.9 G/DL (ref 3.5–5.2)
ALBUMIN SERPL BCG-MCNC: 3.3 G/DL (ref 3.5–5.2)
ALBUMIN UR-MCNC: 20 MG/DL
ALLEN'S TEST: ABNORMAL
ALP SERPL-CCNC: 52 U/L (ref 40–150)
ALP SERPL-CCNC: 57 U/L (ref 40–150)
ALT SERPL W P-5'-P-CCNC: 15 U/L (ref 0–70)
ALT SERPL W P-5'-P-CCNC: 18 U/L (ref 0–70)
AMMONIA PLAS-SCNC: 17 UMOL/L (ref 16–60)
ANION GAP SERPL CALCULATED.3IONS-SCNC: 10 MMOL/L (ref 7–15)
ANION GAP SERPL CALCULATED.3IONS-SCNC: 10 MMOL/L (ref 7–15)
ANION GAP SERPL CALCULATED.3IONS-SCNC: 11 MMOL/L (ref 7–15)
ANION GAP SERPL CALCULATED.3IONS-SCNC: 12 MMOL/L (ref 7–15)
ANION GAP SERPL CALCULATED.3IONS-SCNC: 14 MMOL/L (ref 7–15)
ANION GAP SERPL CALCULATED.3IONS-SCNC: 16 MMOL/L (ref 7–15)
ANION GAP SERPL CALCULATED.3IONS-SCNC: 8 MMOL/L (ref 7–15)
ANION GAP SERPL CALCULATED.3IONS-SCNC: 9 MMOL/L (ref 7–15)
ANION GAP SERPL CALCULATED.3IONS-SCNC: 9 MMOL/L (ref 7–15)
ANTIBODY SCREEN: NEGATIVE
APPEARANCE UR: CLEAR
AST SERPL W P-5'-P-CCNC: 28 U/L (ref 0–45)
AST SERPL W P-5'-P-CCNC: 35 U/L (ref 0–45)
ATRIAL RATE - MUSE: 120 BPM
ATRIAL RATE - MUSE: 166 BPM
ATRIAL RATE - MUSE: 170 BPM
ATRIAL RATE - MUSE: 60 BPM
B-OH-BUTYR SERPL-SCNC: 1.3 MMOL/L
BACTERIA #/AREA URNS HPF: ABNORMAL /HPF
BACTERIA BLD CULT: ABNORMAL
BACTERIA BLD CULT: NO GROWTH
BASE EXCESS BLDA CALC-SCNC: -6.3 MMOL/L (ref -3–3)
BASE EXCESS BLDV CALC-SCNC: -4.9 MMOL/L (ref -3–3)
BASE EXCESS BLDV CALC-SCNC: -6.3 MMOL/L (ref -3–3)
BASOPHILS # BLD AUTO: 0 10E3/UL (ref 0–0.2)
BASOPHILS # BLD AUTO: 0 10E3/UL (ref 0–0.2)
BASOPHILS NFR BLD AUTO: 0 %
BASOPHILS NFR BLD AUTO: 0 %
BILIRUB SERPL-MCNC: 0.3 MG/DL
BILIRUB SERPL-MCNC: 0.4 MG/DL
BILIRUB UR QL STRIP: NEGATIVE
BLD PROD TYP BPU: NORMAL
BLOOD COMPONENT TYPE: NORMAL
BUN SERPL-MCNC: 104 MG/DL (ref 8–23)
BUN SERPL-MCNC: 107 MG/DL (ref 8–23)
BUN SERPL-MCNC: 36.1 MG/DL (ref 8–23)
BUN SERPL-MCNC: 44.5 MG/DL (ref 8–23)
BUN SERPL-MCNC: 62.2 MG/DL (ref 8–23)
BUN SERPL-MCNC: 66.5 MG/DL (ref 8–23)
BUN SERPL-MCNC: 74.2 MG/DL (ref 8–23)
BUN SERPL-MCNC: 92.4 MG/DL (ref 8–23)
BUN SERPL-MCNC: 95.1 MG/DL (ref 8–23)
CA-I BLD-MCNC: 4.9 MG/DL (ref 4.4–5.2)
CA-I BLD-MCNC: 5.2 MG/DL (ref 4.4–5.2)
CALCIUM SERPL-MCNC: 7.6 MG/DL (ref 8.8–10.2)
CALCIUM SERPL-MCNC: 7.8 MG/DL (ref 8.8–10.2)
CALCIUM SERPL-MCNC: 7.8 MG/DL (ref 8.8–10.2)
CALCIUM SERPL-MCNC: 7.9 MG/DL (ref 8.8–10.2)
CALCIUM SERPL-MCNC: 7.9 MG/DL (ref 8.8–10.2)
CALCIUM SERPL-MCNC: 8 MG/DL (ref 8.8–10.2)
CALCIUM SERPL-MCNC: 8.5 MG/DL (ref 8.8–10.2)
CALCIUM SERPL-MCNC: 9.6 MG/DL (ref 8.8–10.2)
CALCIUM SERPL-MCNC: 9.8 MG/DL (ref 8.8–10.2)
CHLORIDE SERPL-SCNC: 121 MMOL/L (ref 98–107)
CHLORIDE SERPL-SCNC: 121 MMOL/L (ref 98–107)
CHLORIDE SERPL-SCNC: 125 MMOL/L (ref 98–107)
CHLORIDE SERPL-SCNC: 126 MMOL/L (ref 98–107)
CHLORIDE SERPL-SCNC: 128 MMOL/L (ref 98–107)
CHLORIDE SERPL-SCNC: 128 MMOL/L (ref 98–107)
CHLORIDE SERPL-SCNC: 130 MMOL/L (ref 98–107)
CHLORIDE SERPL-SCNC: 131 MMOL/L (ref 98–107)
CHLORIDE SERPL-SCNC: 133 MMOL/L (ref 98–107)
CODING SYSTEM: NORMAL
COHGB MFR BLD: 98.5 % (ref 95–96)
COLOR UR AUTO: YELLOW
CREAT SERPL-MCNC: 1.18 MG/DL (ref 0.67–1.17)
CREAT SERPL-MCNC: 1.31 MG/DL (ref 0.67–1.17)
CREAT SERPL-MCNC: 1.6 MG/DL (ref 0.67–1.17)
CREAT SERPL-MCNC: 1.66 MG/DL (ref 0.67–1.17)
CREAT SERPL-MCNC: 1.87 MG/DL (ref 0.67–1.17)
CREAT SERPL-MCNC: 2.15 MG/DL (ref 0.67–1.17)
CREAT SERPL-MCNC: 2.39 MG/DL (ref 0.67–1.17)
CREAT SERPL-MCNC: 2.65 MG/DL (ref 0.67–1.17)
CREAT SERPL-MCNC: 2.73 MG/DL (ref 0.67–1.17)
CREAT UR-MCNC: 119 MG/DL
CROSSMATCH: NORMAL
CRP SERPL-MCNC: 102 MG/L
DEPRECATED HCO3 PLAS-SCNC: 16 MMOL/L (ref 22–29)
DEPRECATED HCO3 PLAS-SCNC: 17 MMOL/L (ref 22–29)
DEPRECATED HCO3 PLAS-SCNC: 18 MMOL/L (ref 22–29)
DEPRECATED HCO3 PLAS-SCNC: 20 MMOL/L (ref 22–29)
DIASTOLIC BLOOD PRESSURE - MUSE: NORMAL MMHG
EGFRCR SERPLBLD CKD-EPI 2021: 22 ML/MIN/1.73M2
EGFRCR SERPLBLD CKD-EPI 2021: 22 ML/MIN/1.73M2
EGFRCR SERPLBLD CKD-EPI 2021: 25 ML/MIN/1.73M2
EGFRCR SERPLBLD CKD-EPI 2021: 29 ML/MIN/1.73M2
EGFRCR SERPLBLD CKD-EPI 2021: 34 ML/MIN/1.73M2
EGFRCR SERPLBLD CKD-EPI 2021: 39 ML/MIN/1.73M2
EGFRCR SERPLBLD CKD-EPI 2021: 41 ML/MIN/1.73M2
EGFRCR SERPLBLD CKD-EPI 2021: 52 ML/MIN/1.73M2
EGFRCR SERPLBLD CKD-EPI 2021: 59 ML/MIN/1.73M2
ENTEROCOCCUS FAECALIS: NOT DETECTED
ENTEROCOCCUS FAECIUM: NOT DETECTED
EOSINOPHIL # BLD AUTO: 0 10E3/UL (ref 0–0.7)
EOSINOPHIL # BLD AUTO: 0.1 10E3/UL (ref 0–0.7)
EOSINOPHIL NFR BLD AUTO: 0 %
EOSINOPHIL NFR BLD AUTO: 0 %
ERYTHROCYTE [DISTWIDTH] IN BLOOD BY AUTOMATED COUNT: 15.9 % (ref 10–15)
ERYTHROCYTE [DISTWIDTH] IN BLOOD BY AUTOMATED COUNT: 16 % (ref 10–15)
ERYTHROCYTE [DISTWIDTH] IN BLOOD BY AUTOMATED COUNT: 16.2 % (ref 10–15)
ERYTHROCYTE [DISTWIDTH] IN BLOOD BY AUTOMATED COUNT: 16.2 % (ref 10–15)
ERYTHROCYTE [DISTWIDTH] IN BLOOD BY AUTOMATED COUNT: 18 % (ref 10–15)
ERYTHROCYTE [SEDIMENTATION RATE] IN BLOOD BY WESTERGREN METHOD: 68 MM/HR (ref 0–20)
FLUAV RNA SPEC QL NAA+PROBE: NEGATIVE
FLUBV RNA RESP QL NAA+PROBE: NEGATIVE
FOLATE SERPL-MCNC: 35.3 NG/ML (ref 4.6–34.8)
GLUCOSE BLDC GLUCOMTR-MCNC: 101 MG/DL (ref 70–99)
GLUCOSE BLDC GLUCOMTR-MCNC: 101 MG/DL (ref 70–99)
GLUCOSE BLDC GLUCOMTR-MCNC: 119 MG/DL (ref 70–99)
GLUCOSE BLDC GLUCOMTR-MCNC: 120 MG/DL (ref 70–99)
GLUCOSE BLDC GLUCOMTR-MCNC: 123 MG/DL (ref 70–99)
GLUCOSE BLDC GLUCOMTR-MCNC: 124 MG/DL (ref 70–99)
GLUCOSE BLDC GLUCOMTR-MCNC: 126 MG/DL (ref 70–99)
GLUCOSE BLDC GLUCOMTR-MCNC: 127 MG/DL (ref 70–99)
GLUCOSE BLDC GLUCOMTR-MCNC: 132 MG/DL (ref 70–99)
GLUCOSE BLDC GLUCOMTR-MCNC: 133 MG/DL (ref 70–99)
GLUCOSE BLDC GLUCOMTR-MCNC: 134 MG/DL (ref 70–99)
GLUCOSE BLDC GLUCOMTR-MCNC: 147 MG/DL (ref 70–99)
GLUCOSE BLDC GLUCOMTR-MCNC: 85 MG/DL (ref 70–99)
GLUCOSE BLDC GLUCOMTR-MCNC: 88 MG/DL (ref 70–99)
GLUCOSE BLDC GLUCOMTR-MCNC: 93 MG/DL (ref 70–99)
GLUCOSE BLDC GLUCOMTR-MCNC: 93 MG/DL (ref 70–99)
GLUCOSE SERPL-MCNC: 108 MG/DL (ref 70–99)
GLUCOSE SERPL-MCNC: 135 MG/DL (ref 70–99)
GLUCOSE SERPL-MCNC: 136 MG/DL (ref 70–99)
GLUCOSE SERPL-MCNC: 140 MG/DL (ref 70–99)
GLUCOSE SERPL-MCNC: 140 MG/DL (ref 70–99)
GLUCOSE SERPL-MCNC: 144 MG/DL (ref 70–99)
GLUCOSE SERPL-MCNC: 214 MG/DL (ref 70–99)
GLUCOSE SERPL-MCNC: 96 MG/DL (ref 70–99)
GLUCOSE SERPL-MCNC: 97 MG/DL (ref 70–99)
GLUCOSE UR STRIP-MCNC: NEGATIVE MG/DL
HCO3 BLD-SCNC: 19 MMOL/L (ref 21–28)
HCO3 BLDV-SCNC: 19 MMOL/L (ref 21–28)
HCO3 BLDV-SCNC: 20 MMOL/L (ref 21–28)
HCT VFR BLD AUTO: 22.2 % (ref 40–53)
HCT VFR BLD AUTO: 24.3 % (ref 40–53)
HCT VFR BLD AUTO: 27.6 % (ref 40–53)
HCT VFR BLD AUTO: 31 % (ref 40–53)
HCT VFR BLD AUTO: 33 % (ref 40–53)
HGB BLD-MCNC: 10.3 G/DL (ref 13.3–17.7)
HGB BLD-MCNC: 6.9 G/DL (ref 13.3–17.7)
HGB BLD-MCNC: 7.5 G/DL (ref 13.3–17.7)
HGB BLD-MCNC: 8.4 G/DL (ref 13.3–17.7)
HGB BLD-MCNC: 9.3 G/DL (ref 13.3–17.7)
HGB UR QL STRIP: NEGATIVE
HOLD SPECIMEN: NORMAL
HYALINE CASTS: 6 /LPF
IMM GRANULOCYTES # BLD: 0 10E3/UL
IMM GRANULOCYTES # BLD: 0.1 10E3/UL
IMM GRANULOCYTES NFR BLD: 0 %
IMM GRANULOCYTES NFR BLD: 1 %
INR (EXTERNAL): 1.5 (ref 0.9–1.1)
INR (EXTERNAL): 1.7 (ref 0.9–1.1)
INR (EXTERNAL): 1.8 (ref 0.9–1.1)
INR (EXTERNAL): 4 (ref 0.9–1.1)
INR PPP: 1.87 (ref 0.85–1.15)
INR PPP: 1.9 (ref 0.85–1.15)
INR PPP: 2.21 (ref 0.85–1.15)
INR PPP: 2.36 (ref 0.85–1.15)
INTERPRETATION ECG - MUSE: NORMAL
ISSUE DATE AND TIME: NORMAL
KETONES UR STRIP-MCNC: NEGATIVE MG/DL
LACTATE SERPL-SCNC: 0.7 MMOL/L (ref 0.7–2)
LACTATE SERPL-SCNC: 1.4 MMOL/L (ref 0.7–2)
LACTATE SERPL-SCNC: 1.5 MMOL/L (ref 0.7–2)
LEUKOCYTE ESTERASE UR QL STRIP: NEGATIVE
LIPASE SERPL-CCNC: 28 U/L (ref 13–60)
LISTERIA SPECIES (DETECTED/NOT DETECTED): NOT DETECTED
LVEF ECHO: NORMAL
LYMPHOCYTES # BLD AUTO: 0.8 10E3/UL (ref 0.8–5.3)
LYMPHOCYTES # BLD AUTO: 0.9 10E3/UL (ref 0.8–5.3)
LYMPHOCYTES NFR BLD AUTO: 4 %
LYMPHOCYTES NFR BLD AUTO: 6 %
MAGNESIUM SERPL-MCNC: 1.8 MG/DL (ref 1.7–2.3)
MAGNESIUM SERPL-MCNC: 1.9 MG/DL (ref 1.7–2.3)
MAGNESIUM SERPL-MCNC: 2.4 MG/DL (ref 1.7–2.3)
MAGNESIUM SERPL-MCNC: 2.6 MG/DL (ref 1.7–2.3)
MCH RBC QN AUTO: 29.9 PG (ref 26.5–33)
MCH RBC QN AUTO: 30.4 PG (ref 26.5–33)
MCH RBC QN AUTO: 30.9 PG (ref 26.5–33)
MCH RBC QN AUTO: 31 PG (ref 26.5–33)
MCH RBC QN AUTO: 31.5 PG (ref 26.5–33)
MCHC RBC AUTO-ENTMCNC: 30 G/DL (ref 31.5–36.5)
MCHC RBC AUTO-ENTMCNC: 30.4 G/DL (ref 31.5–36.5)
MCHC RBC AUTO-ENTMCNC: 30.9 G/DL (ref 31.5–36.5)
MCHC RBC AUTO-ENTMCNC: 31.1 G/DL (ref 31.5–36.5)
MCHC RBC AUTO-ENTMCNC: 31.2 G/DL (ref 31.5–36.5)
MCV RBC AUTO: 100 FL (ref 78–100)
MCV RBC AUTO: 101 FL (ref 78–100)
MCV RBC AUTO: 103 FL (ref 78–100)
MCV RBC AUTO: 98 FL (ref 78–100)
MCV RBC AUTO: 98 FL (ref 78–100)
MONOCYTES # BLD AUTO: 0.8 10E3/UL (ref 0–1.3)
MONOCYTES # BLD AUTO: 1.2 10E3/UL (ref 0–1.3)
MONOCYTES NFR BLD AUTO: 5 %
MONOCYTES NFR BLD AUTO: 6 %
MRSA DNA SPEC QL NAA+PROBE: NEGATIVE
MUCOUS THREADS #/AREA URNS LPF: PRESENT /LPF
NEUTROPHILS # BLD AUTO: 12.4 10E3/UL (ref 1.6–8.3)
NEUTROPHILS # BLD AUTO: 18.9 10E3/UL (ref 1.6–8.3)
NEUTROPHILS NFR BLD AUTO: 89 %
NEUTROPHILS NFR BLD AUTO: 89 %
NITRATE UR QL: NEGATIVE
NRBC # BLD AUTO: 0 10E3/UL
NRBC # BLD AUTO: 0 10E3/UL
NRBC BLD AUTO-RTO: 0 /100
NRBC BLD AUTO-RTO: 0 /100
NT-PROBNP SERPL-MCNC: 3904 PG/ML (ref 0–1800)
NT-PROBNP SERPL-MCNC: ABNORMAL PG/ML (ref 0–1800)
O2/TOTAL GAS SETTING VFR VENT: 21 %
OSMOLALITY SERPL: 375 MMOL/KG (ref 280–301)
OSMOLALITY UR: 611 MMOL/KG (ref 100–1200)
OXYHGB MFR BLDV: 27 % (ref 70–75)
OXYHGB MFR BLDV: 77 % (ref 70–75)
P AXIS - MUSE: NORMAL DEGREES
PCO2 BLD: 33 MM HG (ref 35–45)
PCO2 BLDV: 30 MM HG (ref 40–50)
PCO2 BLDV: 46 MM HG (ref 40–50)
PH BLD: 7.35 [PH] (ref 7.35–7.45)
PH BLDV: 7.26 [PH] (ref 7.32–7.43)
PH BLDV: 7.41 [PH] (ref 7.32–7.43)
PH UR STRIP: 5 [PH] (ref 5–7)
PHOSPHATE SERPL-MCNC: 1.6 MG/DL (ref 2.5–4.5)
PHOSPHATE SERPL-MCNC: 3.1 MG/DL (ref 2.5–4.5)
PHOSPHATE SERPL-MCNC: 3.3 MG/DL (ref 2.5–4.5)
PLATELET # BLD AUTO: 126 10E3/UL (ref 150–450)
PLATELET # BLD AUTO: 127 10E3/UL (ref 150–450)
PLATELET # BLD AUTO: 130 10E3/UL (ref 150–450)
PLATELET # BLD AUTO: 136 10E3/UL (ref 150–450)
PLATELET # BLD AUTO: 171 10E3/UL (ref 150–450)
PO2 BLD: 101 MM HG (ref 80–105)
PO2 BLDV: 21 MM HG (ref 25–47)
PO2 BLDV: 40 MM HG (ref 25–47)
POTASSIUM SERPL-SCNC: 3.1 MMOL/L (ref 3.4–5.3)
POTASSIUM SERPL-SCNC: 3.2 MMOL/L (ref 3.4–5.3)
POTASSIUM SERPL-SCNC: 3.3 MMOL/L (ref 3.4–5.3)
POTASSIUM SERPL-SCNC: 3.4 MMOL/L (ref 3.4–5.3)
POTASSIUM SERPL-SCNC: 3.5 MMOL/L (ref 3.4–5.3)
POTASSIUM SERPL-SCNC: 3.6 MMOL/L (ref 3.4–5.3)
POTASSIUM SERPL-SCNC: 3.8 MMOL/L (ref 3.4–5.3)
POTASSIUM SERPL-SCNC: 4 MMOL/L (ref 3.4–5.3)
POTASSIUM SERPL-SCNC: 4.5 MMOL/L (ref 3.4–5.3)
PR INTERVAL - MUSE: 136 MS
PR INTERVAL - MUSE: 168 MS
PR INTERVAL - MUSE: NORMAL MS
PR INTERVAL - MUSE: NORMAL MS
PROCALCITONIN SERPL IA-MCNC: 0.73 NG/ML
PROT SERPL-MCNC: 5.8 G/DL (ref 6.4–8.3)
PROT SERPL-MCNC: 6.5 G/DL (ref 6.4–8.3)
QRS DURATION - MUSE: 138 MS
QRS DURATION - MUSE: 138 MS
QRS DURATION - MUSE: 152 MS
QRS DURATION - MUSE: 160 MS
QT - MUSE: 408 MS
QT - MUSE: 434 MS
QT - MUSE: 484 MS
QT - MUSE: 510 MS
QTC - MUSE: 478 MS
QTC - MUSE: 507 MS
QTC - MUSE: 510 MS
QTC - MUSE: 620 MS
R AXIS - MUSE: -43 DEGREES
R AXIS - MUSE: -47 DEGREES
R AXIS - MUSE: -54 DEGREES
R AXIS - MUSE: 186 DEGREES
RBC # BLD AUTO: 2.27 10E6/UL (ref 4.4–5.9)
RBC # BLD AUTO: 2.43 10E6/UL (ref 4.4–5.9)
RBC # BLD AUTO: 2.81 10E6/UL (ref 4.4–5.9)
RBC # BLD AUTO: 3 10E6/UL (ref 4.4–5.9)
RBC # BLD AUTO: 3.27 10E6/UL (ref 4.4–5.9)
RBC URINE: 2 /HPF
RSV RNA SPEC NAA+PROBE: NEGATIVE
SA TARGET DNA: NEGATIVE
SAO2 % BLDA: 97 % (ref 92–100)
SAO2 % BLDV: 27.7 % (ref 70–75)
SAO2 % BLDV: 78.5 % (ref 70–75)
SARS-COV-2 RNA RESP QL NAA+PROBE: NEGATIVE
SODIUM SERPL-SCNC: 147 MMOL/L (ref 135–145)
SODIUM SERPL-SCNC: 148 MMOL/L (ref 135–145)
SODIUM SERPL-SCNC: 148 MMOL/L (ref 135–145)
SODIUM SERPL-SCNC: 151 MMOL/L (ref 135–145)
SODIUM SERPL-SCNC: 151 MMOL/L (ref 135–145)
SODIUM SERPL-SCNC: 152 MMOL/L (ref 135–145)
SODIUM SERPL-SCNC: 152 MMOL/L (ref 135–145)
SODIUM SERPL-SCNC: 156 MMOL/L (ref 135–145)
SODIUM SERPL-SCNC: 160 MMOL/L (ref 135–145)
SODIUM SERPL-SCNC: 161 MMOL/L (ref 135–145)
SODIUM SERPL-SCNC: 161 MMOL/L (ref 135–145)
SODIUM SERPL-SCNC: 162 MMOL/L (ref 135–145)
SODIUM SERPL-SCNC: 164 MMOL/L (ref 135–145)
SODIUM SERPL-SCNC: 167 MMOL/L (ref 135–145)
SODIUM UR-SCNC: 29 MMOL/L
SP GR UR STRIP: 1.02 (ref 1–1.03)
SPECIMEN EXPIRATION DATE: NORMAL
SQUAMOUS EPITHELIAL: <1 /HPF
STAPHYLOCOCCUS AUREUS: NOT DETECTED
STAPHYLOCOCCUS EPIDERMIDIS: DETECTED
STAPHYLOCOCCUS LUGDUNENSIS: NOT DETECTED
STREPTOCOCCUS AGALACTIAE: NOT DETECTED
STREPTOCOCCUS ANGINOSUS GROUP: NOT DETECTED
STREPTOCOCCUS PNEUMONIAE: NOT DETECTED
STREPTOCOCCUS PYOGENES: NOT DETECTED
STREPTOCOCCUS SPECIES: NOT DETECTED
SYSTOLIC BLOOD PRESSURE - MUSE: NORMAL MMHG
T AXIS - MUSE: -17 DEGREES
T AXIS - MUSE: -17 DEGREES
T AXIS - MUSE: -18 DEGREES
T AXIS - MUSE: 37 DEGREES
TRANSITIONAL EPI: <1 /HPF
TROPONIN T SERPL HS-MCNC: 102 NG/L
TROPONIN T SERPL HS-MCNC: 120 NG/L
TROPONIN T SERPL HS-MCNC: 140 NG/L
TSH SERPL DL<=0.005 MIU/L-ACNC: 0.33 UIU/ML (ref 0.3–4.2)
UNIT ABO/RH: NORMAL
UNIT NUMBER: NORMAL
UNIT STATUS: NORMAL
UNIT TYPE ISBT: 1700
UROBILINOGEN UR STRIP-MCNC: NORMAL MG/DL
UUN UR-MCNC: 1034 MG/DL (ref 801–1666)
VANCOMYCIN SERPL-MCNC: 12.4 UG/ML
VANCOMYCIN SERPL-MCNC: 8.5 UG/ML
VENTRICULAR RATE- MUSE: 139 BPM
VENTRICULAR RATE- MUSE: 60 BPM
VENTRICULAR RATE- MUSE: 66 BPM
VENTRICULAR RATE- MUSE: 73 BPM
VIT B12 SERPL-MCNC: >4000 PG/ML (ref 232–1245)
WBC # BLD AUTO: 11.1 10E3/UL (ref 4–11)
WBC # BLD AUTO: 12.8 10E3/UL (ref 4–11)
WBC # BLD AUTO: 13.4 10E3/UL (ref 4–11)
WBC # BLD AUTO: 14.1 10E3/UL (ref 4–11)
WBC # BLD AUTO: 21.1 10E3/UL (ref 4–11)
WBC URINE: 4 /HPF

## 2024-01-01 PROCEDURE — 250N000009 HC RX 250: Performed by: INTERNAL MEDICINE

## 2024-01-01 PROCEDURE — 110N000005 HC R&B HOSPICE, ACCENT

## 2024-01-01 PROCEDURE — 80048 BASIC METABOLIC PNL TOTAL CA: CPT | Performed by: NURSE PRACTITIONER

## 2024-01-01 PROCEDURE — 200N000002 HC R&B ICU UMMC

## 2024-01-01 PROCEDURE — 250N000011 HC RX IP 250 OP 636: Performed by: INTERNAL MEDICINE

## 2024-01-01 PROCEDURE — 250N000011 HC RX IP 250 OP 636: Performed by: EMERGENCY MEDICINE

## 2024-01-01 PROCEDURE — 86923 COMPATIBILITY TEST ELECTRIC: CPT

## 2024-01-01 PROCEDURE — 250N000011 HC RX IP 250 OP 636: Performed by: ANESTHESIOLOGY

## 2024-01-01 PROCEDURE — 82805 BLOOD GASES W/O2 SATURATION: CPT | Performed by: INTERNAL MEDICINE

## 2024-01-01 PROCEDURE — 999N000208 ECHOCARDIOGRAM COMPLETE

## 2024-01-01 PROCEDURE — 84295 ASSAY OF SERUM SODIUM: CPT | Performed by: EMERGENCY MEDICINE

## 2024-01-01 PROCEDURE — 99231 SBSQ HOSP IP/OBS SF/LOW 25: CPT | Mod: GV | Performed by: INTERNAL MEDICINE

## 2024-01-01 PROCEDURE — 82570 ASSAY OF URINE CREATININE: CPT | Performed by: NURSE PRACTITIONER

## 2024-01-01 PROCEDURE — 250N000011 HC RX IP 250 OP 636: Performed by: STUDENT IN AN ORGANIZED HEALTH CARE EDUCATION/TRAINING PROGRAM

## 2024-01-01 PROCEDURE — 82140 ASSAY OF AMMONIA: CPT | Performed by: NURSE PRACTITIONER

## 2024-01-01 PROCEDURE — 80048 BASIC METABOLIC PNL TOTAL CA: CPT

## 2024-01-01 PROCEDURE — 84295 ASSAY OF SERUM SODIUM: CPT

## 2024-01-01 PROCEDURE — 120N000002 HC R&B MED SURG/OB UMMC

## 2024-01-01 PROCEDURE — 258N000003 HC RX IP 258 OP 636: Performed by: INTERNAL MEDICINE

## 2024-01-01 PROCEDURE — 99232 SBSQ HOSP IP/OBS MODERATE 35: CPT | Mod: GV | Performed by: INTERNAL MEDICINE

## 2024-01-01 PROCEDURE — 258N000003 HC RX IP 258 OP 636: Performed by: NURSE PRACTITIONER

## 2024-01-01 PROCEDURE — G0463 HOSPITAL OUTPT CLINIC VISIT: HCPCS | Mod: 25

## 2024-01-01 PROCEDURE — 36620 INSERTION CATHETER ARTERY: CPT | Performed by: NURSE PRACTITIONER

## 2024-01-01 PROCEDURE — 99215 OFFICE O/P EST HI 40 MIN: CPT | Mod: 95

## 2024-01-01 PROCEDURE — 99498 ADVNCD CARE PLAN ADDL 30 MIN: CPT | Mod: 25 | Performed by: PHYSICIAN ASSISTANT

## 2024-01-01 PROCEDURE — 82010 KETONE BODYS QUAN: CPT | Performed by: NURSE PRACTITIONER

## 2024-01-01 PROCEDURE — 250N000011 HC RX IP 250 OP 636: Performed by: NURSE PRACTITIONER

## 2024-01-01 PROCEDURE — 83735 ASSAY OF MAGNESIUM: CPT

## 2024-01-01 PROCEDURE — 84100 ASSAY OF PHOSPHORUS: CPT | Performed by: NURSE PRACTITIONER

## 2024-01-01 PROCEDURE — 71045 X-RAY EXAM CHEST 1 VIEW: CPT

## 2024-01-01 PROCEDURE — 250N000011 HC RX IP 250 OP 636

## 2024-01-01 PROCEDURE — 85652 RBC SED RATE AUTOMATED: CPT | Performed by: EMERGENCY MEDICINE

## 2024-01-01 PROCEDURE — 93306 TTE W/DOPPLER COMPLETE: CPT | Mod: 26 | Performed by: INTERNAL MEDICINE

## 2024-01-01 PROCEDURE — 84295 ASSAY OF SERUM SODIUM: CPT | Performed by: NURSE PRACTITIONER

## 2024-01-01 PROCEDURE — 96375 TX/PRO/DX INJ NEW DRUG ADDON: CPT | Performed by: EMERGENCY MEDICINE

## 2024-01-01 PROCEDURE — 85027 COMPLETE CBC AUTOMATED: CPT | Performed by: NURSE PRACTITIONER

## 2024-01-01 PROCEDURE — 250N000009 HC RX 250: Performed by: NURSE PRACTITIONER

## 2024-01-01 PROCEDURE — G0463 HOSPITAL OUTPT CLINIC VISIT: HCPCS | Performed by: INTERNAL MEDICINE

## 2024-01-01 PROCEDURE — 93010 ELECTROCARDIOGRAM REPORT: CPT | Performed by: EMERGENCY MEDICINE

## 2024-01-01 PROCEDURE — 99233 SBSQ HOSP IP/OBS HIGH 50: CPT

## 2024-01-01 PROCEDURE — 85610 PROTHROMBIN TIME: CPT | Performed by: NURSE PRACTITIONER

## 2024-01-01 PROCEDURE — 87637 SARSCOV2&INF A&B&RSV AMP PRB: CPT | Performed by: EMERGENCY MEDICINE

## 2024-01-01 PROCEDURE — 83735 ASSAY OF MAGNESIUM: CPT | Performed by: NURSE PRACTITIONER

## 2024-01-01 PROCEDURE — 85025 COMPLETE CBC W/AUTO DIFF WBC: CPT | Performed by: EMERGENCY MEDICINE

## 2024-01-01 PROCEDURE — 258N000003 HC RX IP 258 OP 636: Performed by: EMERGENCY MEDICINE

## 2024-01-01 PROCEDURE — G0463 HOSPITAL OUTPT CLINIC VISIT: HCPCS

## 2024-01-01 PROCEDURE — 85014 HEMATOCRIT: CPT | Performed by: NURSE PRACTITIONER

## 2024-01-01 PROCEDURE — 99233 SBSQ HOSP IP/OBS HIGH 50: CPT | Performed by: INTERNAL MEDICINE

## 2024-01-01 PROCEDURE — 250N000013 HC RX MED GY IP 250 OP 250 PS 637: Performed by: NURSE PRACTITIONER

## 2024-01-01 PROCEDURE — 250N000011 HC RX IP 250 OP 636: Mod: JZ

## 2024-01-01 PROCEDURE — 83880 ASSAY OF NATRIURETIC PEPTIDE: CPT | Performed by: NURSE PRACTITIONER

## 2024-01-01 PROCEDURE — 96367 TX/PROPH/DG ADDL SEQ IV INF: CPT | Performed by: EMERGENCY MEDICINE

## 2024-01-01 PROCEDURE — 84145 PROCALCITONIN (PCT): CPT | Performed by: NURSE PRACTITIONER

## 2024-01-01 PROCEDURE — 87640 STAPH A DNA AMP PROBE: CPT | Performed by: NURSE PRACTITIONER

## 2024-01-01 PROCEDURE — 87186 SC STD MICRODIL/AGAR DIL: CPT | Performed by: EMERGENCY MEDICINE

## 2024-01-01 PROCEDURE — 86901 BLOOD TYPING SEROLOGIC RH(D): CPT | Performed by: INTERNAL MEDICINE

## 2024-01-01 PROCEDURE — 258N000003 HC RX IP 258 OP 636

## 2024-01-01 PROCEDURE — 74018 RADEX ABDOMEN 1 VIEW: CPT | Mod: 26 | Performed by: RADIOLOGY

## 2024-01-01 PROCEDURE — 99497 ADVNCD CARE PLAN 30 MIN: CPT | Mod: 25 | Performed by: PHYSICIAN ASSISTANT

## 2024-01-01 PROCEDURE — 86140 C-REACTIVE PROTEIN: CPT | Performed by: EMERGENCY MEDICINE

## 2024-01-01 PROCEDURE — 83880 ASSAY OF NATRIURETIC PEPTIDE: CPT | Performed by: INTERNAL MEDICINE

## 2024-01-01 PROCEDURE — 999N000204 HC STATISTICAL VASC ACCESS NURSE TIME, 31-45 MINUTES

## 2024-01-01 PROCEDURE — 250N000013 HC RX MED GY IP 250 OP 250 PS 637: Performed by: INTERNAL MEDICINE

## 2024-01-01 PROCEDURE — 36415 COLL VENOUS BLD VENIPUNCTURE: CPT | Performed by: EMERGENCY MEDICINE

## 2024-01-01 PROCEDURE — 99207 PR NO CHARGE SIGN-OFF PS: CPT | Performed by: INTERNAL MEDICINE

## 2024-01-01 PROCEDURE — 99207 PR APP CREDIT; MD BILLING SHARED VISIT: CPT | Performed by: INTERNAL MEDICINE

## 2024-01-01 PROCEDURE — 99215 OFFICE O/P EST HI 40 MIN: CPT | Performed by: INTERNAL MEDICINE

## 2024-01-01 PROCEDURE — 96361 HYDRATE IV INFUSION ADD-ON: CPT | Performed by: EMERGENCY MEDICINE

## 2024-01-01 PROCEDURE — 36415 COLL VENOUS BLD VENIPUNCTURE: CPT | Performed by: INTERNAL MEDICINE

## 2024-01-01 PROCEDURE — 93005 ELECTROCARDIOGRAM TRACING: CPT

## 2024-01-01 PROCEDURE — 99285 EMERGENCY DEPT VISIT HI MDM: CPT | Performed by: EMERGENCY MEDICINE

## 2024-01-01 PROCEDURE — 70450 CT HEAD/BRAIN W/O DYE: CPT | Mod: 26 | Performed by: RADIOLOGY

## 2024-01-01 PROCEDURE — 84540 ASSAY OF URINE/UREA-N: CPT | Performed by: NURSE PRACTITIONER

## 2024-01-01 PROCEDURE — 82805 BLOOD GASES W/O2 SATURATION: CPT | Performed by: NURSE PRACTITIONER

## 2024-01-01 PROCEDURE — 71045 X-RAY EXAM CHEST 1 VIEW: CPT | Mod: 26 | Performed by: RADIOLOGY

## 2024-01-01 PROCEDURE — 96366 THER/PROPH/DIAG IV INF ADDON: CPT | Performed by: EMERGENCY MEDICINE

## 2024-01-01 PROCEDURE — 96365 THER/PROPH/DIAG IV INF INIT: CPT | Performed by: EMERGENCY MEDICINE

## 2024-01-01 PROCEDURE — 93010 ELECTROCARDIOGRAM REPORT: CPT | Performed by: INTERNAL MEDICINE

## 2024-01-01 PROCEDURE — 99223 1ST HOSP IP/OBS HIGH 75: CPT | Mod: FS | Performed by: STUDENT IN AN ORGANIZED HEALTH CARE EDUCATION/TRAINING PROGRAM

## 2024-01-01 PROCEDURE — 93010 ELECTROCARDIOGRAM REPORT: CPT | Mod: 76 | Performed by: EMERGENCY MEDICINE

## 2024-01-01 PROCEDURE — 85610 PROTHROMBIN TIME: CPT | Performed by: EMERGENCY MEDICINE

## 2024-01-01 PROCEDURE — 84300 ASSAY OF URINE SODIUM: CPT | Performed by: NURSE PRACTITIONER

## 2024-01-01 PROCEDURE — 93005 ELECTROCARDIOGRAM TRACING: CPT | Mod: 76 | Performed by: EMERGENCY MEDICINE

## 2024-01-01 PROCEDURE — 82310 ASSAY OF CALCIUM: CPT | Performed by: EMERGENCY MEDICINE

## 2024-01-01 PROCEDURE — 999N000128 HC STATISTIC PERIPHERAL IV START W/O US GUIDANCE

## 2024-01-01 PROCEDURE — 87149 DNA/RNA DIRECT PROBE: CPT | Performed by: EMERGENCY MEDICINE

## 2024-01-01 PROCEDURE — 87641 MR-STAPH DNA AMP PROBE: CPT | Performed by: NURSE PRACTITIONER

## 2024-01-01 PROCEDURE — P9016 RBC LEUKOCYTES REDUCED: HCPCS

## 2024-01-01 PROCEDURE — 99238 HOSP IP/OBS DSCHRG MGMT 30/<: CPT | Mod: GV | Performed by: STUDENT IN AN ORGANIZED HEALTH CARE EDUCATION/TRAINING PROGRAM

## 2024-01-01 PROCEDURE — 99213 OFFICE O/P EST LOW 20 MIN: CPT | Performed by: HOSPITALIST

## 2024-01-01 PROCEDURE — 80053 COMPREHEN METABOLIC PANEL: CPT | Performed by: EMERGENCY MEDICINE

## 2024-01-01 PROCEDURE — 82330 ASSAY OF CALCIUM: CPT | Performed by: NURSE PRACTITIONER

## 2024-01-01 PROCEDURE — 250N000013 HC RX MED GY IP 250 OP 250 PS 637

## 2024-01-01 PROCEDURE — 81001 URINALYSIS AUTO W/SCOPE: CPT | Performed by: EMERGENCY MEDICINE

## 2024-01-01 PROCEDURE — 82607 VITAMIN B-12: CPT | Performed by: NURSE PRACTITIONER

## 2024-01-01 PROCEDURE — 76770 US EXAM ABDO BACK WALL COMP: CPT

## 2024-01-01 PROCEDURE — 84484 ASSAY OF TROPONIN QUANT: CPT | Performed by: NURSE PRACTITIONER

## 2024-01-01 PROCEDURE — 87040 BLOOD CULTURE FOR BACTERIA: CPT | Performed by: NURSE PRACTITIONER

## 2024-01-01 PROCEDURE — 87040 BLOOD CULTURE FOR BACTERIA: CPT | Performed by: EMERGENCY MEDICINE

## 2024-01-01 PROCEDURE — 255N000002 HC RX 255 OP 636: Performed by: INTERNAL MEDICINE

## 2024-01-01 PROCEDURE — 99207 PR APP CREDIT; MD BILLING SHARED VISIT: CPT | Performed by: STUDENT IN AN ORGANIZED HEALTH CARE EDUCATION/TRAINING PROGRAM

## 2024-01-01 PROCEDURE — 84443 ASSAY THYROID STIM HORMONE: CPT | Performed by: NURSE PRACTITIONER

## 2024-01-01 PROCEDURE — 36415 COLL VENOUS BLD VENIPUNCTURE: CPT | Performed by: NURSE PRACTITIONER

## 2024-01-01 PROCEDURE — 99291 CRITICAL CARE FIRST HOUR: CPT | Mod: 25 | Performed by: NURSE PRACTITIONER

## 2024-01-01 PROCEDURE — 86900 BLOOD TYPING SEROLOGIC ABO: CPT | Performed by: INTERNAL MEDICINE

## 2024-01-01 PROCEDURE — 99285 EMERGENCY DEPT VISIT HI MDM: CPT | Mod: 25 | Performed by: EMERGENCY MEDICINE

## 2024-01-01 PROCEDURE — 99232 SBSQ HOSP IP/OBS MODERATE 35: CPT | Performed by: INTERNAL MEDICINE

## 2024-01-01 PROCEDURE — 80053 COMPREHEN METABOLIC PANEL: CPT | Performed by: NURSE PRACTITIONER

## 2024-01-01 PROCEDURE — 83690 ASSAY OF LIPASE: CPT | Performed by: NURSE PRACTITIONER

## 2024-01-01 PROCEDURE — 93295 DEV INTERROG REMOTE 1/2/MLT: CPT | Performed by: INTERNAL MEDICINE

## 2024-01-01 PROCEDURE — 83605 ASSAY OF LACTIC ACID: CPT | Performed by: NURSE PRACTITIONER

## 2024-01-01 PROCEDURE — 99222 1ST HOSP IP/OBS MODERATE 55: CPT | Mod: GV | Performed by: INTERNAL MEDICINE

## 2024-01-01 PROCEDURE — 83935 ASSAY OF URINE OSMOLALITY: CPT | Performed by: NURSE PRACTITIONER

## 2024-01-01 PROCEDURE — 99222 1ST HOSP IP/OBS MODERATE 55: CPT | Mod: 25 | Performed by: PHYSICIAN ASSISTANT

## 2024-01-01 PROCEDURE — 99418 PROLNG IP/OBS E/M EA 15 MIN: CPT | Performed by: INTERNAL MEDICINE

## 2024-01-01 PROCEDURE — 82746 ASSAY OF FOLIC ACID SERUM: CPT | Performed by: NURSE PRACTITIONER

## 2024-01-01 PROCEDURE — 93005 ELECTROCARDIOGRAM TRACING: CPT | Performed by: EMERGENCY MEDICINE

## 2024-01-01 PROCEDURE — 80202 ASSAY OF VANCOMYCIN: CPT | Performed by: INTERNAL MEDICINE

## 2024-01-01 PROCEDURE — 85025 COMPLETE CBC W/AUTO DIFF WBC: CPT | Performed by: NURSE PRACTITIONER

## 2024-01-01 PROCEDURE — 84484 ASSAY OF TROPONIN QUANT: CPT | Performed by: EMERGENCY MEDICINE

## 2024-01-01 PROCEDURE — 83930 ASSAY OF BLOOD OSMOLALITY: CPT | Performed by: NURSE PRACTITIONER

## 2024-01-01 PROCEDURE — 999N000285 HC STATISTIC VASC ACCESS LAB DRAW WITH PIV START

## 2024-01-01 PROCEDURE — 250N000011 HC RX IP 250 OP 636: Mod: JZ | Performed by: INTERNAL MEDICINE

## 2024-01-01 PROCEDURE — 83605 ASSAY OF LACTIC ACID: CPT | Performed by: EMERGENCY MEDICINE

## 2024-01-01 PROCEDURE — 999N000127 HC STATISTIC PERIPHERAL IV START W US GUIDANCE

## 2024-01-01 PROCEDURE — 70450 CT HEAD/BRAIN W/O DYE: CPT

## 2024-01-01 PROCEDURE — 999N000065 XR ABDOMEN PORT 1 VIEW

## 2024-01-01 PROCEDURE — 97602 WOUND(S) CARE NON-SELECTIVE: CPT

## 2024-01-01 PROCEDURE — 93296 REM INTERROG EVL PM/IDS: CPT

## 2024-01-01 PROCEDURE — C8929 TTE W OR WO FOL WCON,DOPPLER: HCPCS

## 2024-01-01 PROCEDURE — 76770 US EXAM ABDO BACK WALL COMP: CPT | Mod: 26 | Performed by: RADIOLOGY

## 2024-01-01 RX ORDER — SALIVA STIMULANT COMB. NO.3
1 SPRAY, NON-AEROSOL (ML) MUCOUS MEMBRANE
Status: CANCELLED | OUTPATIENT
Start: 2024-01-01

## 2024-01-01 RX ORDER — ONDANSETRON 2 MG/ML
4 INJECTION INTRAMUSCULAR; INTRAVENOUS EVERY 6 HOURS PRN
Status: DISCONTINUED | OUTPATIENT
Start: 2024-01-01 | End: 2024-01-01 | Stop reason: HOSPADM

## 2024-01-01 RX ORDER — BISACODYL 10 MG
10 SUPPOSITORY, RECTAL RECTAL DAILY PRN
Status: DISCONTINUED | OUTPATIENT
Start: 2024-01-01 | End: 2024-01-01

## 2024-01-01 RX ORDER — MORPHINE SULFATE 10 MG/ML
10 INJECTION, SOLUTION INTRAMUSCULAR; INTRAVENOUS EVERY 4 HOURS
Status: DISCONTINUED | OUTPATIENT
Start: 2024-01-01 | End: 2024-01-01

## 2024-01-01 RX ORDER — NALOXONE HYDROCHLORIDE 0.4 MG/ML
0.2 INJECTION, SOLUTION INTRAMUSCULAR; INTRAVENOUS; SUBCUTANEOUS
Status: DISCONTINUED | OUTPATIENT
Start: 2024-01-01 | End: 2024-01-01 | Stop reason: HOSPADM

## 2024-01-01 RX ORDER — MORPHINE SULFATE 10 MG/ML
10 INJECTION, SOLUTION INTRAMUSCULAR; INTRAVENOUS
Status: DISCONTINUED | OUTPATIENT
Start: 2024-01-01 | End: 2024-01-01

## 2024-01-01 RX ORDER — BISACODYL 10 MG
10 SUPPOSITORY, RECTAL RECTAL
Status: CANCELLED | OUTPATIENT
Start: 2024-01-01

## 2024-01-01 RX ORDER — LORAZEPAM 1 MG/1
1 TABLET ORAL
Status: CANCELLED | OUTPATIENT
Start: 2024-01-01

## 2024-01-01 RX ORDER — LORAZEPAM 2 MG/ML
1 INJECTION INTRAMUSCULAR
Status: DISCONTINUED | OUTPATIENT
Start: 2024-01-01 | End: 2024-01-01 | Stop reason: HOSPADM

## 2024-01-01 RX ORDER — MORPHINE SULFATE 10 MG/ML
10 INJECTION INTRAVENOUS EVERY 4 HOURS
Status: DISCONTINUED | OUTPATIENT
Start: 2024-01-01 | End: 2024-01-01

## 2024-01-01 RX ORDER — AMOXICILLIN 250 MG
1 CAPSULE ORAL 2 TIMES DAILY
Status: DISCONTINUED | OUTPATIENT
Start: 2024-01-01 | End: 2024-01-01

## 2024-01-01 RX ORDER — MINERAL OIL/HYDROPHIL PETROLAT
OINTMENT (GRAM) TOPICAL
Status: CANCELLED | OUTPATIENT
Start: 2024-01-01

## 2024-01-01 RX ORDER — PROCHLORPERAZINE MALEATE 5 MG
5 TABLET ORAL EVERY 6 HOURS PRN
Status: DISCONTINUED | OUTPATIENT
Start: 2024-01-01 | End: 2024-01-01 | Stop reason: HOSPADM

## 2024-01-01 RX ORDER — MORPHINE SULFATE 20 MG/ML
10 SOLUTION ORAL
Status: DISCONTINUED | OUTPATIENT
Start: 2024-01-01 | End: 2024-01-01

## 2024-01-01 RX ORDER — DEXTROSE MONOHYDRATE 50 MG/ML
INJECTION, SOLUTION INTRAVENOUS CONTINUOUS
Status: DISCONTINUED | OUTPATIENT
Start: 2024-01-01 | End: 2024-01-01

## 2024-01-01 RX ORDER — ACETAMINOPHEN 325 MG/10.15ML
650 LIQUID ORAL EVERY 6 HOURS PRN
Status: CANCELLED | OUTPATIENT
Start: 2024-01-01

## 2024-01-01 RX ORDER — VANCOMYCIN HYDROCHLORIDE 125 MG/1
125 CAPSULE ORAL 4 TIMES DAILY
Status: DISCONTINUED | OUTPATIENT
Start: 2024-01-01 | End: 2024-01-01

## 2024-01-01 RX ORDER — SODIUM CHLORIDE 9 MG/ML
INJECTION, SOLUTION INTRAVENOUS CONTINUOUS
Status: DISCONTINUED | OUTPATIENT
Start: 2024-01-01 | End: 2024-01-01

## 2024-01-01 RX ORDER — SODIUM CHLORIDE, SODIUM LACTATE, POTASSIUM CHLORIDE, CALCIUM CHLORIDE 600; 310; 30; 20 MG/100ML; MG/100ML; MG/100ML; MG/100ML
INJECTION, SOLUTION INTRAVENOUS CONTINUOUS
Status: ACTIVE | OUTPATIENT
Start: 2024-01-01 | End: 2024-01-01

## 2024-01-01 RX ORDER — MORPHINE SULFATE 10 MG/5ML
10 SOLUTION ORAL
Status: CANCELLED | OUTPATIENT
Start: 2024-01-01

## 2024-01-01 RX ORDER — FENTANYL CITRATE 50 UG/ML
50 INJECTION, SOLUTION INTRAMUSCULAR; INTRAVENOUS
Status: COMPLETED | OUTPATIENT
Start: 2024-01-01 | End: 2024-01-01

## 2024-01-01 RX ORDER — LIDOCAINE 40 MG/G
CREAM TOPICAL
Status: ACTIVE | OUTPATIENT
Start: 2024-01-01 | End: 2024-01-01

## 2024-01-01 RX ORDER — AMOXICILLIN 250 MG
2 CAPSULE ORAL 2 TIMES DAILY
Status: DISCONTINUED | OUTPATIENT
Start: 2024-01-01 | End: 2024-01-01

## 2024-01-01 RX ORDER — LORAZEPAM 1 MG/1
1 TABLET ORAL
Status: DISCONTINUED | OUTPATIENT
Start: 2024-01-01 | End: 2024-01-01 | Stop reason: HOSPADM

## 2024-01-01 RX ORDER — PIPERACILLIN SODIUM, TAZOBACTAM SODIUM 3; .375 G/15ML; G/15ML
3.38 INJECTION, POWDER, LYOPHILIZED, FOR SOLUTION INTRAVENOUS ONCE
Status: COMPLETED | OUTPATIENT
Start: 2024-01-01 | End: 2024-01-01

## 2024-01-01 RX ORDER — VITAMIN B COMPLEX
25 TABLET ORAL 2 TIMES DAILY
Status: DISCONTINUED | OUTPATIENT
Start: 2024-01-01 | End: 2024-01-01

## 2024-01-01 RX ORDER — ACETAMINOPHEN 650 MG/1
650 SUPPOSITORY RECTAL EVERY 6 HOURS PRN
Status: DISCONTINUED | OUTPATIENT
Start: 2024-01-01 | End: 2024-01-01 | Stop reason: HOSPADM

## 2024-01-01 RX ORDER — POTASSIUM CHLORIDE 7.45 MG/ML
10 INJECTION INTRAVENOUS
Status: COMPLETED | OUTPATIENT
Start: 2024-01-01 | End: 2024-01-01

## 2024-01-01 RX ORDER — ATROPINE SULFATE 10 MG/ML
2 SOLUTION/ DROPS OPHTHALMIC EVERY 4 HOURS PRN
Status: DISCONTINUED | OUTPATIENT
Start: 2024-01-01 | End: 2024-01-01 | Stop reason: HOSPADM

## 2024-01-01 RX ORDER — MORPHINE SULFATE 2 MG/ML
INJECTION, SOLUTION INTRAMUSCULAR; INTRAVENOUS
Status: COMPLETED
Start: 2024-01-01 | End: 2024-01-01

## 2024-01-01 RX ORDER — FENTANYL CITRATE 50 UG/ML
25 INJECTION, SOLUTION INTRAMUSCULAR; INTRAVENOUS
Status: DISCONTINUED | OUTPATIENT
Start: 2024-01-01 | End: 2024-01-01

## 2024-01-01 RX ORDER — DOXYCYCLINE 100 MG/1
100 CAPSULE ORAL EVERY 12 HOURS
Qty: 60 CAPSULE | Refills: 0 | Status: ON HOLD | OUTPATIENT
Start: 2024-01-01 | End: 2024-01-01

## 2024-01-01 RX ORDER — NALOXONE HYDROCHLORIDE 0.4 MG/ML
0.4 INJECTION, SOLUTION INTRAMUSCULAR; INTRAVENOUS; SUBCUTANEOUS
Status: DISCONTINUED | OUTPATIENT
Start: 2024-01-01 | End: 2024-01-01 | Stop reason: HOSPADM

## 2024-01-01 RX ORDER — ONDANSETRON 4 MG/1
4 TABLET, ORALLY DISINTEGRATING ORAL EVERY 6 HOURS PRN
Status: DISCONTINUED | OUTPATIENT
Start: 2024-01-01 | End: 2024-01-01 | Stop reason: HOSPADM

## 2024-01-01 RX ORDER — SERTRALINE HYDROCHLORIDE 100 MG/1
200 TABLET, FILM COATED ORAL EVERY EVENING
Status: DISCONTINUED | OUTPATIENT
Start: 2024-01-01 | End: 2024-01-01

## 2024-01-01 RX ORDER — ONDANSETRON 4 MG/1
4 TABLET, ORALLY DISINTEGRATING ORAL EVERY 6 HOURS PRN
Status: CANCELLED | OUTPATIENT
Start: 2024-01-01

## 2024-01-01 RX ORDER — LORAZEPAM 2 MG/ML
0.5 INJECTION INTRAMUSCULAR EVERY 6 HOURS
Status: DISCONTINUED | OUTPATIENT
Start: 2024-01-01 | End: 2024-01-01 | Stop reason: HOSPADM

## 2024-01-01 RX ORDER — SODIUM CHLORIDE 9 MG/ML
1000 INJECTION, SOLUTION INTRAVENOUS CONTINUOUS
Status: DISCONTINUED | OUTPATIENT
Start: 2024-01-01 | End: 2024-01-01 | Stop reason: HOSPADM

## 2024-01-01 RX ORDER — AMOXICILLIN 250 MG
2 CAPSULE ORAL 2 TIMES DAILY
Status: DISCONTINUED | OUTPATIENT
Start: 2024-01-01 | End: 2024-01-01 | Stop reason: HOSPADM

## 2024-01-01 RX ORDER — HALOPERIDOL 5 MG/ML
1 INJECTION INTRAMUSCULAR
Status: CANCELLED | OUTPATIENT
Start: 2024-01-01

## 2024-01-01 RX ORDER — ONDANSETRON 2 MG/ML
4 INJECTION INTRAMUSCULAR; INTRAVENOUS EVERY 6 HOURS PRN
Status: CANCELLED | OUTPATIENT
Start: 2024-01-01

## 2024-01-01 RX ORDER — WARFARIN SODIUM 2.5 MG/1
2.5 TABLET ORAL
Status: COMPLETED | OUTPATIENT
Start: 2024-01-01 | End: 2024-01-01

## 2024-01-01 RX ORDER — ECHINACEA PURPUREA EXTRACT 125 MG
1 TABLET ORAL
Status: CANCELLED | OUTPATIENT
Start: 2024-01-01

## 2024-01-01 RX ORDER — MORPHINE SULFATE 10 MG/ML
10 INJECTION INTRAVENOUS
Status: DISCONTINUED | OUTPATIENT
Start: 2024-01-01 | End: 2024-01-01

## 2024-01-01 RX ORDER — LIDOCAINE 40 MG/G
CREAM TOPICAL
Status: DISCONTINUED | OUTPATIENT
Start: 2024-01-01 | End: 2024-01-01 | Stop reason: HOSPADM

## 2024-01-01 RX ORDER — MORPHINE SULFATE 2 MG/ML
2 INJECTION, SOLUTION INTRAMUSCULAR; INTRAVENOUS
Status: CANCELLED | OUTPATIENT
Start: 2024-01-01

## 2024-01-01 RX ORDER — ACETAMINOPHEN 325 MG/10.15ML
650 LIQUID ORAL EVERY 4 HOURS PRN
Status: DISCONTINUED | OUTPATIENT
Start: 2024-01-01 | End: 2024-01-01

## 2024-01-01 RX ORDER — PIPERACILLIN SODIUM, TAZOBACTAM SODIUM 2; .25 G/10ML; G/10ML
2.25 INJECTION, POWDER, LYOPHILIZED, FOR SOLUTION INTRAVENOUS EVERY 6 HOURS
Status: DISCONTINUED | OUTPATIENT
Start: 2024-01-01 | End: 2024-01-01

## 2024-01-01 RX ORDER — LANOLIN ALCOHOL/MO/W.PET/CERES
2000 CREAM (GRAM) TOPICAL 2 TIMES DAILY
Status: DISCONTINUED | OUTPATIENT
Start: 2024-01-01 | End: 2024-01-01

## 2024-01-01 RX ORDER — LIDOCAINE HYDROCHLORIDE 20 MG/ML
5 JELLY TOPICAL
Status: DISCONTINUED | OUTPATIENT
Start: 2024-01-01 | End: 2024-01-01 | Stop reason: HOSPADM

## 2024-01-01 RX ORDER — GLYCOPYRROLATE 0.2 MG/ML
0.2 INJECTION, SOLUTION INTRAMUSCULAR; INTRAVENOUS EVERY 4 HOURS PRN
Status: DISCONTINUED | OUTPATIENT
Start: 2024-01-01 | End: 2024-01-01 | Stop reason: HOSPADM

## 2024-01-01 RX ORDER — MORPHINE SULFATE 20 MG/ML
10 SOLUTION ORAL
Status: CANCELLED | OUTPATIENT
Start: 2024-01-01

## 2024-01-01 RX ORDER — NALOXONE HYDROCHLORIDE 0.4 MG/ML
0.1 INJECTION, SOLUTION INTRAMUSCULAR; INTRAVENOUS; SUBCUTANEOUS
Status: CANCELLED | OUTPATIENT
Start: 2024-01-01

## 2024-01-01 RX ORDER — WARFARIN SODIUM 2.5 MG/1
2.5 TABLET ORAL ONCE
Status: COMPLETED | OUTPATIENT
Start: 2024-01-01 | End: 2024-01-01

## 2024-01-01 RX ORDER — CALCIUM CARBONATE 500(1250)
500 TABLET ORAL 2 TIMES DAILY
Status: DISCONTINUED | OUTPATIENT
Start: 2024-01-01 | End: 2024-01-01

## 2024-01-01 RX ORDER — ACETAMINOPHEN 325 MG/1
650 TABLET ORAL EVERY 4 HOURS PRN
Status: DISCONTINUED | OUTPATIENT
Start: 2024-01-01 | End: 2024-01-01

## 2024-01-01 RX ORDER — MORPHINE SULFATE 4 MG/ML
7 INJECTION, SOLUTION INTRAMUSCULAR; INTRAVENOUS
Status: DISCONTINUED | OUTPATIENT
Start: 2024-01-01 | End: 2024-01-01 | Stop reason: HOSPADM

## 2024-01-01 RX ORDER — PROCHLORPERAZINE 25 MG
12.5 SUPPOSITORY, RECTAL RECTAL EVERY 12 HOURS PRN
Status: CANCELLED | OUTPATIENT
Start: 2024-01-01

## 2024-01-01 RX ORDER — ACETAMINOPHEN 650 MG/1
650 SUPPOSITORY RECTAL EVERY 6 HOURS PRN
Status: DISCONTINUED | OUTPATIENT
Start: 2024-01-01 | End: 2024-01-01

## 2024-01-01 RX ORDER — PROCHLORPERAZINE 25 MG
12.5 SUPPOSITORY, RECTAL RECTAL EVERY 12 HOURS PRN
Status: DISCONTINUED | OUTPATIENT
Start: 2024-01-01 | End: 2024-01-01 | Stop reason: HOSPADM

## 2024-01-01 RX ORDER — CARBOXYMETHYLCELLULOSE SODIUM 5 MG/ML
1-2 SOLUTION/ DROPS OPHTHALMIC
Status: DISCONTINUED | OUTPATIENT
Start: 2024-01-01 | End: 2024-01-01 | Stop reason: HOSPADM

## 2024-01-01 RX ORDER — DEXTROSE MONOHYDRATE 50 MG/ML
INJECTION, SOLUTION INTRAVENOUS CONTINUOUS
Status: DISPENSED | OUTPATIENT
Start: 2024-01-01 | End: 2024-01-01

## 2024-01-01 RX ORDER — FENTANYL CITRATE 50 UG/ML
50 INJECTION, SOLUTION INTRAMUSCULAR; INTRAVENOUS ONCE
Status: COMPLETED | OUTPATIENT
Start: 2024-01-01 | End: 2024-01-01

## 2024-01-01 RX ORDER — CALCIUM GLUCONATE 20 MG/ML
1 INJECTION, SOLUTION INTRAVENOUS ONCE
Status: COMPLETED | OUTPATIENT
Start: 2024-01-01 | End: 2024-01-01

## 2024-01-01 RX ORDER — MINERAL OIL/HYDROPHIL PETROLAT
OINTMENT (GRAM) TOPICAL
Status: DISCONTINUED | OUTPATIENT
Start: 2024-01-01 | End: 2024-01-01 | Stop reason: HOSPADM

## 2024-01-01 RX ORDER — MORPHINE SULFATE 20 MG/ML
20 SOLUTION ORAL
Status: DISCONTINUED | OUTPATIENT
Start: 2024-01-01 | End: 2024-01-01

## 2024-01-01 RX ORDER — BISACODYL 10 MG
10 SUPPOSITORY, RECTAL RECTAL
Status: DISCONTINUED | OUTPATIENT
Start: 2024-01-01 | End: 2024-01-01 | Stop reason: HOSPADM

## 2024-01-01 RX ORDER — MORPHINE SULFATE 10 MG/ML
10 INJECTION, SOLUTION INTRAMUSCULAR; INTRAVENOUS EVERY 4 HOURS
Status: DISCONTINUED | OUTPATIENT
Start: 2024-01-01 | End: 2024-01-01 | Stop reason: HOSPADM

## 2024-01-01 RX ORDER — NALOXONE HYDROCHLORIDE 0.4 MG/ML
0.2 INJECTION, SOLUTION INTRAMUSCULAR; INTRAVENOUS; SUBCUTANEOUS
Status: CANCELLED | OUTPATIENT
Start: 2024-01-01

## 2024-01-01 RX ORDER — ACETAMINOPHEN 650 MG/1
650 SUPPOSITORY RECTAL EVERY 4 HOURS PRN
Status: DISCONTINUED | OUTPATIENT
Start: 2024-01-01 | End: 2024-01-01 | Stop reason: HOSPADM

## 2024-01-01 RX ORDER — SODIUM CHLORIDE, SODIUM LACTATE, POTASSIUM CHLORIDE, CALCIUM CHLORIDE 600; 310; 30; 20 MG/100ML; MG/100ML; MG/100ML; MG/100ML
1000 INJECTION, SOLUTION INTRAVENOUS CONTINUOUS
Status: DISCONTINUED | OUTPATIENT
Start: 2024-01-01 | End: 2024-01-01

## 2024-01-01 RX ORDER — CEFTRIAXONE 2 G/1
2 INJECTION, POWDER, FOR SOLUTION INTRAMUSCULAR; INTRAVENOUS EVERY 24 HOURS
Status: DISCONTINUED | OUTPATIENT
Start: 2024-01-01 | End: 2024-01-01

## 2024-01-01 RX ORDER — SALIVA STIMULANT COMB. NO.3
1 SPRAY, NON-AEROSOL (ML) MUCOUS MEMBRANE
Status: DISCONTINUED | OUTPATIENT
Start: 2024-01-01 | End: 2024-01-01 | Stop reason: HOSPADM

## 2024-01-01 RX ORDER — PROCHLORPERAZINE MALEATE 5 MG
5 TABLET ORAL EVERY 6 HOURS PRN
Status: CANCELLED | OUTPATIENT
Start: 2024-01-01

## 2024-01-01 RX ORDER — MORPHINE SULFATE 10 MG/ML
10 INJECTION, SOLUTION INTRAMUSCULAR; INTRAVENOUS
Status: DISCONTINUED | OUTPATIENT
Start: 2024-01-01 | End: 2024-01-01 | Stop reason: HOSPADM

## 2024-01-01 RX ORDER — MORPHINE SULFATE 2 MG/ML
2 INJECTION, SOLUTION INTRAMUSCULAR; INTRAVENOUS
Status: DISCONTINUED | OUTPATIENT
Start: 2024-01-01 | End: 2024-01-01

## 2024-01-01 RX ORDER — ATROPINE SULFATE 10 MG/ML
2 SOLUTION/ DROPS OPHTHALMIC EVERY 4 HOURS PRN
Status: CANCELLED | OUTPATIENT
Start: 2024-01-01

## 2024-01-01 RX ORDER — HALOPERIDOL 5 MG/ML
1 INJECTION INTRAMUSCULAR
Status: DISCONTINUED | OUTPATIENT
Start: 2024-01-01 | End: 2024-01-01 | Stop reason: HOSPADM

## 2024-01-01 RX ORDER — NALOXONE HYDROCHLORIDE 0.4 MG/ML
0.1 INJECTION, SOLUTION INTRAMUSCULAR; INTRAVENOUS; SUBCUTANEOUS
Status: DISCONTINUED | OUTPATIENT
Start: 2024-01-01 | End: 2024-01-01 | Stop reason: HOSPADM

## 2024-01-01 RX ORDER — FENTANYL CITRATE 50 UG/ML
50 INJECTION, SOLUTION INTRAMUSCULAR; INTRAVENOUS
Status: DISCONTINUED | OUTPATIENT
Start: 2024-01-01 | End: 2024-01-01

## 2024-01-01 RX ORDER — HYDROMORPHONE HYDROCHLORIDE 1 MG/ML
0.5 INJECTION, SOLUTION INTRAMUSCULAR; INTRAVENOUS; SUBCUTANEOUS
Status: COMPLETED | OUTPATIENT
Start: 2024-01-01 | End: 2024-01-01

## 2024-01-01 RX ORDER — LIDOCAINE HYDROCHLORIDE 20 MG/ML
5 JELLY TOPICAL
Status: CANCELLED | OUTPATIENT
Start: 2024-01-01

## 2024-01-01 RX ORDER — HYDROMORPHONE HCL IN WATER/PF 6 MG/30 ML
0.2 PATIENT CONTROLLED ANALGESIA SYRINGE INTRAVENOUS
Status: DISCONTINUED | OUTPATIENT
Start: 2024-01-01 | End: 2024-01-01

## 2024-01-01 RX ORDER — DOXYCYCLINE 100 MG/1
100 CAPSULE ORAL EVERY 12 HOURS
Qty: 28 CAPSULE | Refills: 0 | Status: SHIPPED | OUTPATIENT
Start: 2024-01-01 | End: 2024-01-01

## 2024-01-01 RX ORDER — NICOTINE POLACRILEX 4 MG
15-30 LOZENGE BUCCAL
Status: DISCONTINUED | OUTPATIENT
Start: 2024-01-01 | End: 2024-01-01

## 2024-01-01 RX ORDER — POLYETHYLENE GLYCOL 3350 17 G/17G
17 POWDER, FOR SOLUTION ORAL DAILY PRN
Status: DISCONTINUED | OUTPATIENT
Start: 2024-01-01 | End: 2024-01-01

## 2024-01-01 RX ORDER — BISACODYL 10 MG
10 SUPPOSITORY, RECTAL RECTAL 2 TIMES DAILY PRN
Status: DISCONTINUED | OUTPATIENT
Start: 2024-01-01 | End: 2024-01-01 | Stop reason: HOSPADM

## 2024-01-01 RX ORDER — DEXTROSE MONOHYDRATE 100 MG/ML
INJECTION, SOLUTION INTRAVENOUS CONTINUOUS PRN
Status: DISCONTINUED | OUTPATIENT
Start: 2024-01-01 | End: 2024-01-01

## 2024-01-01 RX ORDER — ACETAMINOPHEN 650 MG/1
650 SUPPOSITORY RECTAL EVERY 6 HOURS PRN
Status: CANCELLED | OUTPATIENT
Start: 2024-01-01

## 2024-01-01 RX ORDER — MORPHINE SULFATE 10 MG/5ML
10 SOLUTION ORAL
Status: DISCONTINUED | OUTPATIENT
Start: 2024-01-01 | End: 2024-01-01

## 2024-01-01 RX ORDER — GUAIFENESIN 600 MG/1
15 TABLET, EXTENDED RELEASE ORAL DAILY
Status: DISCONTINUED | OUTPATIENT
Start: 2024-01-01 | End: 2024-01-01

## 2024-01-01 RX ORDER — GLYCOPYRROLATE 0.2 MG/ML
0.2 INJECTION, SOLUTION INTRAMUSCULAR; INTRAVENOUS EVERY 4 HOURS PRN
Status: CANCELLED | OUTPATIENT
Start: 2024-01-01

## 2024-01-01 RX ORDER — LORAZEPAM 2 MG/ML
1 INJECTION INTRAMUSCULAR
Status: CANCELLED | OUTPATIENT
Start: 2024-01-01

## 2024-01-01 RX ORDER — CARBOXYMETHYLCELLULOSE SODIUM 5 MG/ML
1-2 SOLUTION/ DROPS OPHTHALMIC
Status: CANCELLED | OUTPATIENT
Start: 2024-01-01

## 2024-01-01 RX ORDER — FENTANYL CITRATE 50 UG/ML
25-50 INJECTION, SOLUTION INTRAMUSCULAR; INTRAVENOUS
Status: DISCONTINUED | OUTPATIENT
Start: 2024-01-01 | End: 2024-01-01

## 2024-01-01 RX ORDER — MORPHINE SULFATE 20 MG/ML
20 SOLUTION ORAL
Status: DISCONTINUED | OUTPATIENT
Start: 2024-01-01 | End: 2024-01-01 | Stop reason: HOSPADM

## 2024-01-01 RX ORDER — DEXTROSE MONOHYDRATE 25 G/50ML
25-50 INJECTION, SOLUTION INTRAVENOUS
Status: DISCONTINUED | OUTPATIENT
Start: 2024-01-01 | End: 2024-01-01

## 2024-01-01 RX ORDER — AMOXICILLIN AND CLAVULANATE POTASSIUM 500; 125 MG/1; MG/1
1 TABLET, FILM COATED ORAL 2 TIMES DAILY
Qty: 60 TABLET | Refills: 0 | Status: ON HOLD | OUTPATIENT
Start: 2024-01-01 | End: 2024-01-01

## 2024-01-01 RX ADMIN — LORAZEPAM 0.5 MG: 2 INJECTION INTRAMUSCULAR; INTRAVENOUS at 20:07

## 2024-01-01 RX ADMIN — FENTANYL CITRATE 50 MCG: 50 INJECTION INTRAMUSCULAR; INTRAVENOUS at 19:59

## 2024-01-01 RX ADMIN — LORAZEPAM 1 MG: 2 INJECTION INTRAMUSCULAR; INTRAVENOUS at 18:37

## 2024-01-01 RX ADMIN — CEFTRIAXONE SODIUM 2 G: 2 INJECTION, POWDER, FOR SOLUTION INTRAMUSCULAR; INTRAVENOUS at 12:35

## 2024-01-01 RX ADMIN — LORAZEPAM 1 MG: 2 INJECTION INTRAMUSCULAR; INTRAVENOUS at 06:29

## 2024-01-01 RX ADMIN — MORPHINE SULFATE 10 MG: 10 INJECTION, SOLUTION INTRAMUSCULAR; INTRAVENOUS at 23:25

## 2024-01-01 RX ADMIN — LORAZEPAM 1 MG: 2 INJECTION INTRAMUSCULAR; INTRAVENOUS at 06:00

## 2024-01-01 RX ADMIN — MICONAZOLE NITRATE: 20 POWDER TOPICAL at 09:57

## 2024-01-01 RX ADMIN — FENTANYL CITRATE 25 MCG: 50 INJECTION INTRAMUSCULAR; INTRAVENOUS at 12:07

## 2024-01-01 RX ADMIN — FENTANYL CITRATE 25 MCG: 50 INJECTION INTRAMUSCULAR; INTRAVENOUS at 21:46

## 2024-01-01 RX ADMIN — ATROPINE SULFATE 2 DROP: 10 SOLUTION/ DROPS OPHTHALMIC at 11:18

## 2024-01-01 RX ADMIN — LORAZEPAM 1 MG: 2 INJECTION INTRAMUSCULAR; INTRAVENOUS at 23:46

## 2024-01-01 RX ADMIN — MICONAZOLE NITRATE: 20 POWDER TOPICAL at 00:10

## 2024-01-01 RX ADMIN — FENTANYL CITRATE 50 MCG: 50 INJECTION INTRAMUSCULAR; INTRAVENOUS at 10:20

## 2024-01-01 RX ADMIN — FENTANYL CITRATE 25 MCG: 50 INJECTION INTRAMUSCULAR; INTRAVENOUS at 07:40

## 2024-01-01 RX ADMIN — SODIUM CHLORIDE, POTASSIUM CHLORIDE, SODIUM LACTATE AND CALCIUM CHLORIDE 1000 ML: 600; 310; 30; 20 INJECTION, SOLUTION INTRAVENOUS at 14:40

## 2024-01-01 RX ADMIN — FENTANYL CITRATE 50 MCG: 50 INJECTION INTRAMUSCULAR; INTRAVENOUS at 22:08

## 2024-01-01 RX ADMIN — MICONAZOLE NITRATE: 20 POWDER TOPICAL at 07:48

## 2024-01-01 RX ADMIN — LORAZEPAM 0.5 MG: 2 INJECTION INTRAMUSCULAR; INTRAVENOUS at 06:21

## 2024-01-01 RX ADMIN — Medication 3 MG/HR: at 10:36

## 2024-01-01 RX ADMIN — FENTANYL CITRATE 50 MCG: 50 INJECTION INTRAMUSCULAR; INTRAVENOUS at 09:22

## 2024-01-01 RX ADMIN — SODIUM CHLORIDE, POTASSIUM CHLORIDE, SODIUM LACTATE AND CALCIUM CHLORIDE: 600; 310; 30; 20 INJECTION, SOLUTION INTRAVENOUS at 01:56

## 2024-01-01 RX ADMIN — HYDROMORPHONE HYDROCHLORIDE 0.5 MG: 1 INJECTION, SOLUTION INTRAMUSCULAR; INTRAVENOUS; SUBCUTANEOUS at 22:24

## 2024-01-01 RX ADMIN — Medication 3 MG/HR: at 14:14

## 2024-01-01 RX ADMIN — SODIUM CHLORIDE, POTASSIUM CHLORIDE, SODIUM LACTATE AND CALCIUM CHLORIDE: 600; 310; 30; 20 INJECTION, SOLUTION INTRAVENOUS at 01:00

## 2024-01-01 RX ADMIN — LORAZEPAM 1 MG: 2 INJECTION INTRAMUSCULAR; INTRAVENOUS at 10:37

## 2024-01-01 RX ADMIN — FENTANYL CITRATE 25 MCG: 50 INJECTION INTRAMUSCULAR; INTRAVENOUS at 11:28

## 2024-01-01 RX ADMIN — DEXTROSE MONOHYDRATE: 50 INJECTION, SOLUTION INTRAVENOUS at 17:31

## 2024-01-01 RX ADMIN — ACETAMINOPHEN 650 MG: 650 SUPPOSITORY RECTAL at 17:36

## 2024-01-01 RX ADMIN — HYDROMORPHONE HYDROCHLORIDE 0.2 MG: 0.2 INJECTION, SOLUTION INTRAMUSCULAR; INTRAVENOUS; SUBCUTANEOUS at 22:07

## 2024-01-01 RX ADMIN — MORPHINE SULFATE 10 MG: 10 INJECTION, SOLUTION INTRAMUSCULAR; INTRAVENOUS at 11:41

## 2024-01-01 RX ADMIN — Medication 3 MG/HR: at 02:05

## 2024-01-01 RX ADMIN — FENTANYL CITRATE 50 MCG: 50 INJECTION INTRAMUSCULAR; INTRAVENOUS at 13:52

## 2024-01-01 RX ADMIN — LORAZEPAM 0.5 MG: 2 INJECTION INTRAMUSCULAR; INTRAVENOUS at 07:41

## 2024-01-01 RX ADMIN — ACETAMINOPHEN 650 MG: 650 SUPPOSITORY RECTAL at 10:01

## 2024-01-01 RX ADMIN — SODIUM CHLORIDE: 9 INJECTION, SOLUTION INTRAVENOUS at 18:38

## 2024-01-01 RX ADMIN — LORAZEPAM 0.5 MG: 2 INJECTION INTRAMUSCULAR; INTRAVENOUS at 23:22

## 2024-01-01 RX ADMIN — FENTANYL CITRATE 50 MCG: 50 INJECTION INTRAMUSCULAR; INTRAVENOUS at 18:03

## 2024-01-01 RX ADMIN — MORPHINE SULFATE 10 MG: 10 INJECTION, SOLUTION INTRAMUSCULAR; INTRAVENOUS at 20:12

## 2024-01-01 RX ADMIN — MORPHINE SULFATE 10 MG: 10 INJECTION, SOLUTION INTRAMUSCULAR; INTRAVENOUS at 16:25

## 2024-01-01 RX ADMIN — LORAZEPAM 0.5 MG: 2 INJECTION INTRAMUSCULAR; INTRAVENOUS at 23:07

## 2024-01-01 RX ADMIN — LORAZEPAM 0.5 MG: 2 INJECTION INTRAMUSCULAR; INTRAVENOUS at 06:16

## 2024-01-01 RX ADMIN — FENTANYL CITRATE 25 MCG: 50 INJECTION INTRAMUSCULAR; INTRAVENOUS at 12:12

## 2024-01-01 RX ADMIN — MORPHINE SULFATE 10 MG: 10 INJECTION, SOLUTION INTRAMUSCULAR; INTRAVENOUS at 16:10

## 2024-01-01 RX ADMIN — FENTANYL CITRATE 25 MCG: 50 INJECTION INTRAMUSCULAR; INTRAVENOUS at 22:17

## 2024-01-01 RX ADMIN — LORAZEPAM 1 MG: 2 INJECTION INTRAMUSCULAR; INTRAVENOUS at 01:54

## 2024-01-01 RX ADMIN — MORPHINE SULFATE 10 MG: 10 INJECTION, SOLUTION INTRAMUSCULAR; INTRAVENOUS at 07:55

## 2024-01-01 RX ADMIN — CEFTRIAXONE SODIUM 2 G: 2 INJECTION, POWDER, FOR SOLUTION INTRAMUSCULAR; INTRAVENOUS at 12:12

## 2024-01-01 RX ADMIN — Medication 3 MG/HR: at 21:15

## 2024-01-01 RX ADMIN — LORAZEPAM 1 MG: 2 INJECTION INTRAMUSCULAR; INTRAVENOUS at 13:07

## 2024-01-01 RX ADMIN — Medication 3 MG/HR: at 03:22

## 2024-01-01 RX ADMIN — LORAZEPAM 0.5 MG: 2 INJECTION INTRAMUSCULAR; INTRAVENOUS at 18:24

## 2024-01-01 RX ADMIN — LORAZEPAM 1 MG: 2 INJECTION INTRAMUSCULAR; INTRAVENOUS at 03:05

## 2024-01-01 RX ADMIN — MORPHINE SULFATE 10 MG: 10 INJECTION, SOLUTION INTRAMUSCULAR; INTRAVENOUS at 11:19

## 2024-01-01 RX ADMIN — MICONAZOLE NITRATE: 20 POWDER TOPICAL at 19:50

## 2024-01-01 RX ADMIN — MICONAZOLE NITRATE: 20 POWDER TOPICAL at 19:42

## 2024-01-01 RX ADMIN — FENTANYL CITRATE 25 MCG: 50 INJECTION INTRAMUSCULAR; INTRAVENOUS at 10:40

## 2024-01-01 RX ADMIN — Medication 3 MG/HR: at 10:30

## 2024-01-01 RX ADMIN — SODIUM CHLORIDE 500 ML: 9 INJECTION, SOLUTION INTRAVENOUS at 03:12

## 2024-01-01 RX ADMIN — LORAZEPAM 1 MG: 2 INJECTION INTRAMUSCULAR; INTRAVENOUS at 00:11

## 2024-01-01 RX ADMIN — LORAZEPAM 0.5 MG: 2 INJECTION INTRAMUSCULAR; INTRAVENOUS at 05:59

## 2024-01-01 RX ADMIN — LORAZEPAM 1 MG: 2 INJECTION INTRAMUSCULAR; INTRAVENOUS at 23:30

## 2024-01-01 RX ADMIN — FENTANYL CITRATE 25 MCG: 50 INJECTION INTRAMUSCULAR; INTRAVENOUS at 22:36

## 2024-01-01 RX ADMIN — LORAZEPAM 1 MG: 2 INJECTION INTRAMUSCULAR; INTRAVENOUS at 22:52

## 2024-01-01 RX ADMIN — FENTANYL CITRATE 50 MCG: 50 INJECTION INTRAMUSCULAR; INTRAVENOUS at 02:58

## 2024-01-01 RX ADMIN — POTASSIUM PHOSPHATE, MONOBASIC POTASSIUM PHOSPHATE, DIBASIC 15 MMOL: 224; 236 INJECTION, SOLUTION, CONCENTRATE INTRAVENOUS at 06:41

## 2024-01-01 RX ADMIN — MICONAZOLE NITRATE: 20 POWDER TOPICAL at 20:00

## 2024-01-01 RX ADMIN — MICONAZOLE NITRATE: 20 POWDER TOPICAL at 09:41

## 2024-01-01 RX ADMIN — ATROPINE SULFATE 2 DROP: 10 SOLUTION/ DROPS OPHTHALMIC at 00:28

## 2024-01-01 RX ADMIN — LORAZEPAM 1 MG: 2 INJECTION INTRAMUSCULAR; INTRAVENOUS at 20:16

## 2024-01-01 RX ADMIN — SODIUM CHLORIDE: 9 INJECTION, SOLUTION INTRAVENOUS at 21:15

## 2024-01-01 RX ADMIN — FENTANYL CITRATE 25 MCG: 50 INJECTION INTRAMUSCULAR; INTRAVENOUS at 08:46

## 2024-01-01 RX ADMIN — LORAZEPAM 0.5 MG: 2 INJECTION INTRAMUSCULAR; INTRAVENOUS at 00:33

## 2024-01-01 RX ADMIN — POTASSIUM CHLORIDE 10 MEQ: 7.46 INJECTION, SOLUTION INTRAVENOUS at 21:04

## 2024-01-01 RX ADMIN — LORAZEPAM 0.5 MG: 2 INJECTION INTRAMUSCULAR; INTRAVENOUS at 19:03

## 2024-01-01 RX ADMIN — LORAZEPAM 1 MG: 2 INJECTION INTRAMUSCULAR; INTRAVENOUS at 09:41

## 2024-01-01 RX ADMIN — FENTANYL CITRATE 50 MCG: 50 INJECTION INTRAMUSCULAR; INTRAVENOUS at 06:14

## 2024-01-01 RX ADMIN — FENTANYL CITRATE 25 MCG: 50 INJECTION INTRAMUSCULAR; INTRAVENOUS at 20:16

## 2024-01-01 RX ADMIN — LORAZEPAM 1 MG: 2 INJECTION INTRAMUSCULAR; INTRAVENOUS at 19:43

## 2024-01-01 RX ADMIN — LORAZEPAM 1 MG: 2 INJECTION INTRAMUSCULAR; INTRAVENOUS at 08:46

## 2024-01-01 RX ADMIN — VANCOMYCIN HYDROCHLORIDE 750 MG: 1 INJECTION, POWDER, LYOPHILIZED, FOR SOLUTION INTRAVENOUS at 21:25

## 2024-01-01 RX ADMIN — MICONAZOLE NITRATE: 20 POWDER TOPICAL at 22:58

## 2024-01-01 RX ADMIN — FENTANYL CITRATE 25 MCG: 50 INJECTION INTRAMUSCULAR; INTRAVENOUS at 18:38

## 2024-01-01 RX ADMIN — CEFTRIAXONE SODIUM 2 G: 2 INJECTION, POWDER, FOR SOLUTION INTRAMUSCULAR; INTRAVENOUS at 12:28

## 2024-01-01 RX ADMIN — FENTANYL CITRATE 25 MCG: 50 INJECTION INTRAMUSCULAR; INTRAVENOUS at 14:14

## 2024-01-01 RX ADMIN — LORAZEPAM 0.5 MG: 2 INJECTION INTRAMUSCULAR; INTRAVENOUS at 13:55

## 2024-01-01 RX ADMIN — LORAZEPAM 0.5 MG: 2 INJECTION INTRAMUSCULAR; INTRAVENOUS at 12:10

## 2024-01-01 RX ADMIN — LORAZEPAM 1 MG: 2 INJECTION INTRAMUSCULAR; INTRAVENOUS at 14:30

## 2024-01-01 RX ADMIN — LORAZEPAM 1 MG: 2 INJECTION INTRAMUSCULAR; INTRAVENOUS at 16:08

## 2024-01-01 RX ADMIN — MICONAZOLE NITRATE: 20 POWDER TOPICAL at 19:48

## 2024-01-01 RX ADMIN — FENTANYL CITRATE 25 MCG: 50 INJECTION INTRAMUSCULAR; INTRAVENOUS at 23:10

## 2024-01-01 RX ADMIN — Medication 3 MG/HR: at 08:01

## 2024-01-01 RX ADMIN — FENTANYL CITRATE 50 MCG: 50 INJECTION INTRAMUSCULAR; INTRAVENOUS at 11:34

## 2024-01-01 RX ADMIN — LORAZEPAM 1 MG: 2 INJECTION INTRAMUSCULAR; INTRAVENOUS at 20:56

## 2024-01-01 RX ADMIN — FENTANYL CITRATE 25 MCG: 50 INJECTION INTRAMUSCULAR; INTRAVENOUS at 00:28

## 2024-01-01 RX ADMIN — LORAZEPAM 1 MG: 2 INJECTION INTRAMUSCULAR; INTRAVENOUS at 08:28

## 2024-01-01 RX ADMIN — LORAZEPAM 1 MG: 2 INJECTION INTRAMUSCULAR; INTRAVENOUS at 04:56

## 2024-01-01 RX ADMIN — DEXTROSE MONOHYDRATE: 50 INJECTION, SOLUTION INTRAVENOUS at 13:24

## 2024-01-01 RX ADMIN — MICONAZOLE NITRATE: 20 POWDER TOPICAL at 23:03

## 2024-01-01 RX ADMIN — MICONAZOLE NITRATE: 20 POWDER TOPICAL at 20:07

## 2024-01-01 RX ADMIN — FENTANYL CITRATE 50 MCG: 50 INJECTION INTRAMUSCULAR; INTRAVENOUS at 12:45

## 2024-01-01 RX ADMIN — LORAZEPAM 1 MG: 1 TABLET ORAL at 14:54

## 2024-01-01 RX ADMIN — SODIUM CHLORIDE 500 ML: 9 INJECTION, SOLUTION INTRAVENOUS at 00:09

## 2024-01-01 RX ADMIN — Medication 3 MG/HR: at 12:30

## 2024-01-01 RX ADMIN — CALCIUM GLUCONATE 1 G: 20 INJECTION, SOLUTION INTRAVENOUS at 22:57

## 2024-01-01 RX ADMIN — LORAZEPAM 1 MG: 2 INJECTION INTRAMUSCULAR; INTRAVENOUS at 04:11

## 2024-01-01 RX ADMIN — FENTANYL CITRATE 50 MCG: 50 INJECTION INTRAMUSCULAR; INTRAVENOUS at 17:02

## 2024-01-01 RX ADMIN — SODIUM CHLORIDE 500 ML: 9 INJECTION, SOLUTION INTRAVENOUS at 19:56

## 2024-01-01 RX ADMIN — ACETAMINOPHEN 650 MG: 650 SUPPOSITORY RECTAL at 22:07

## 2024-01-01 RX ADMIN — FENTANYL CITRATE 50 MCG: 50 INJECTION INTRAMUSCULAR; INTRAVENOUS at 20:54

## 2024-01-01 RX ADMIN — MORPHINE SULFATE 10 MG: 10 INJECTION, SOLUTION INTRAMUSCULAR; INTRAVENOUS at 03:50

## 2024-01-01 RX ADMIN — MORPHINE SULFATE 10 MG: 10 INJECTION, SOLUTION INTRAMUSCULAR; INTRAVENOUS at 19:49

## 2024-01-01 RX ADMIN — LORAZEPAM 1 MG: 2 INJECTION INTRAMUSCULAR; INTRAVENOUS at 09:52

## 2024-01-01 RX ADMIN — PIPERACILLIN AND TAZOBACTAM 2.25 G: 2; .25 INJECTION, POWDER, FOR SOLUTION INTRAVENOUS at 05:54

## 2024-01-01 RX ADMIN — Medication 3 MG/HR: at 04:54

## 2024-01-01 RX ADMIN — SODIUM CHLORIDE: 9 INJECTION, SOLUTION INTRAVENOUS at 13:00

## 2024-01-01 RX ADMIN — DEXTROSE MONOHYDRATE: 50 INJECTION, SOLUTION INTRAVENOUS at 08:09

## 2024-01-01 RX ADMIN — FENTANYL CITRATE 50 MCG: 50 INJECTION INTRAMUSCULAR; INTRAVENOUS at 15:05

## 2024-01-01 RX ADMIN — MORPHINE SULFATE 10 MG: 10 INJECTION, SOLUTION INTRAMUSCULAR; INTRAVENOUS at 07:45

## 2024-01-01 RX ADMIN — MORPHINE SULFATE 10 MG: 10 INJECTION, SOLUTION INTRAMUSCULAR; INTRAVENOUS at 00:10

## 2024-01-01 RX ADMIN — DEXTROSE MONOHYDRATE: 50 INJECTION, SOLUTION INTRAVENOUS at 01:28

## 2024-01-01 RX ADMIN — HYDROMORPHONE HYDROCHLORIDE 0.5 MG: 1 INJECTION, SOLUTION INTRAMUSCULAR; INTRAVENOUS; SUBCUTANEOUS at 13:24

## 2024-01-01 RX ADMIN — DEXTROSE MONOHYDRATE: 50 INJECTION, SOLUTION INTRAVENOUS at 21:08

## 2024-01-01 RX ADMIN — MICONAZOLE NITRATE: 20 POWDER TOPICAL at 20:39

## 2024-01-01 RX ADMIN — LORAZEPAM 1 MG: 2 INJECTION INTRAMUSCULAR; INTRAVENOUS at 11:32

## 2024-01-01 RX ADMIN — BISACODYL 10 MG: 10 SUPPOSITORY RECTAL at 21:46

## 2024-01-01 RX ADMIN — FENTANYL CITRATE 25 MCG: 50 INJECTION INTRAMUSCULAR; INTRAVENOUS at 19:48

## 2024-01-01 RX ADMIN — MICONAZOLE NITRATE: 20 POWDER TOPICAL at 08:31

## 2024-01-01 RX ADMIN — LORAZEPAM 0.5 MG: 2 INJECTION INTRAMUSCULAR; INTRAVENOUS at 00:02

## 2024-01-01 RX ADMIN — FENTANYL CITRATE 25 MCG: 50 INJECTION INTRAMUSCULAR; INTRAVENOUS at 05:20

## 2024-01-01 RX ADMIN — MICONAZOLE NITRATE: 20 POWDER TOPICAL at 08:40

## 2024-01-01 RX ADMIN — LORAZEPAM 1 MG: 2 INJECTION INTRAMUSCULAR; INTRAVENOUS at 01:28

## 2024-01-01 RX ADMIN — LORAZEPAM 0.5 MG: 2 INJECTION INTRAMUSCULAR; INTRAVENOUS at 18:42

## 2024-01-01 RX ADMIN — LORAZEPAM 1 MG: 2 INJECTION INTRAMUSCULAR; INTRAVENOUS at 16:56

## 2024-01-01 RX ADMIN — SODIUM CHLORIDE: 9 INJECTION, SOLUTION INTRAVENOUS at 00:57

## 2024-01-01 RX ADMIN — Medication 3 MG/HR: at 19:40

## 2024-01-01 RX ADMIN — SODIUM CHLORIDE, POTASSIUM CHLORIDE, SODIUM LACTATE AND CALCIUM CHLORIDE 1000 ML: 600; 310; 30; 20 INJECTION, SOLUTION INTRAVENOUS at 13:28

## 2024-01-01 RX ADMIN — FENTANYL CITRATE 25 MCG: 50 INJECTION INTRAMUSCULAR; INTRAVENOUS at 17:28

## 2024-01-01 RX ADMIN — FENTANYL CITRATE 50 MCG: 50 INJECTION INTRAMUSCULAR; INTRAVENOUS at 01:35

## 2024-01-01 RX ADMIN — MICONAZOLE NITRATE: 20 POWDER TOPICAL at 21:16

## 2024-01-01 RX ADMIN — LORAZEPAM 0.5 MG: 2 INJECTION INTRAMUSCULAR; INTRAVENOUS at 00:10

## 2024-01-01 RX ADMIN — HUMAN ALBUMIN MICROSPHERES AND PERFLUTREN 6 ML: 10; .22 INJECTION, SOLUTION INTRAVENOUS at 15:11

## 2024-01-01 RX ADMIN — FENTANYL CITRATE 25 MCG: 50 INJECTION INTRAMUSCULAR; INTRAVENOUS at 01:27

## 2024-01-01 RX ADMIN — FENTANYL CITRATE 50 MCG: 50 INJECTION INTRAMUSCULAR; INTRAVENOUS at 23:24

## 2024-01-01 RX ADMIN — VANCOMYCIN HYDROCHLORIDE 1250 MG: 10 INJECTION, POWDER, LYOPHILIZED, FOR SOLUTION INTRAVENOUS at 18:05

## 2024-01-01 RX ADMIN — LORAZEPAM 1 MG: 2 INJECTION INTRAMUSCULAR; INTRAVENOUS at 09:54

## 2024-01-01 RX ADMIN — FENTANYL CITRATE 25 MCG: 50 INJECTION INTRAMUSCULAR; INTRAVENOUS at 14:30

## 2024-01-01 RX ADMIN — LORAZEPAM 0.5 MG: 2 INJECTION INTRAMUSCULAR; INTRAVENOUS at 14:02

## 2024-01-01 RX ADMIN — ATROPINE SULFATE 2 DROP: 10 SOLUTION/ DROPS OPHTHALMIC at 22:53

## 2024-01-01 RX ADMIN — LORAZEPAM 0.5 MG: 2 INJECTION INTRAMUSCULAR; INTRAVENOUS at 06:25

## 2024-01-01 RX ADMIN — LORAZEPAM 0.5 MG: 2 INJECTION INTRAMUSCULAR; INTRAVENOUS at 12:02

## 2024-01-01 RX ADMIN — MORPHINE SULFATE 10 MG: 10 INJECTION, SOLUTION INTRAMUSCULAR; INTRAVENOUS at 08:30

## 2024-01-01 RX ADMIN — POTASSIUM CHLORIDE 10 MEQ: 7.46 INJECTION, SOLUTION INTRAVENOUS at 02:08

## 2024-01-01 RX ADMIN — MORPHINE SULFATE 10 MG: 10 INJECTION, SOLUTION INTRAMUSCULAR; INTRAVENOUS at 20:10

## 2024-01-01 RX ADMIN — MICONAZOLE NITRATE: 20 POWDER TOPICAL at 07:46

## 2024-01-01 RX ADMIN — ACETAMINOPHEN 650 MG: 650 SUPPOSITORY RECTAL at 21:46

## 2024-01-01 RX ADMIN — MORPHINE SULFATE 10 MG: 10 INJECTION, SOLUTION INTRAMUSCULAR; INTRAVENOUS at 03:34

## 2024-01-01 RX ADMIN — SODIUM CHLORIDE: 9 INJECTION, SOLUTION INTRAVENOUS at 19:41

## 2024-01-01 RX ADMIN — FENTANYL CITRATE 50 MCG: 50 INJECTION INTRAMUSCULAR; INTRAVENOUS at 16:03

## 2024-01-01 RX ADMIN — LORAZEPAM 1 MG: 2 INJECTION INTRAMUSCULAR; INTRAVENOUS at 19:50

## 2024-01-01 RX ADMIN — MORPHINE SULFATE 2 MG: 2 INJECTION, SOLUTION INTRAMUSCULAR; INTRAVENOUS at 18:36

## 2024-01-01 RX ADMIN — LORAZEPAM 1 MG: 2 INJECTION INTRAMUSCULAR; INTRAVENOUS at 11:34

## 2024-01-01 RX ADMIN — Medication 20 MG: at 21:05

## 2024-01-01 RX ADMIN — LORAZEPAM 0.5 MG: 2 INJECTION INTRAMUSCULAR; INTRAVENOUS at 19:08

## 2024-01-01 RX ADMIN — LORAZEPAM 1 MG: 2 INJECTION INTRAMUSCULAR; INTRAVENOUS at 18:59

## 2024-01-01 RX ADMIN — FENTANYL CITRATE 25 MCG: 50 INJECTION INTRAMUSCULAR; INTRAVENOUS at 13:16

## 2024-01-01 RX ADMIN — VANCOMYCIN HYDROCHLORIDE 750 MG: 1 INJECTION, POWDER, LYOPHILIZED, FOR SOLUTION INTRAVENOUS at 21:02

## 2024-01-01 RX ADMIN — MICONAZOLE NITRATE: 20 POWDER TOPICAL at 08:00

## 2024-01-01 RX ADMIN — FENTANYL CITRATE 50 MCG: 50 INJECTION INTRAMUSCULAR; INTRAVENOUS at 05:08

## 2024-01-01 RX ADMIN — FENTANYL CITRATE 25 MCG: 50 INJECTION INTRAMUSCULAR; INTRAVENOUS at 22:52

## 2024-01-01 RX ADMIN — MORPHINE SULFATE 10 MG: 10 INJECTION, SOLUTION INTRAMUSCULAR; INTRAVENOUS at 15:39

## 2024-01-01 RX ADMIN — PIPERACILLIN AND TAZOBACTAM 3.38 G: 3; .375 INJECTION, POWDER, FOR SOLUTION INTRAVENOUS at 17:03

## 2024-01-01 RX ADMIN — LORAZEPAM 0.5 MG: 2 INJECTION INTRAMUSCULAR; INTRAVENOUS at 01:12

## 2024-01-01 RX ADMIN — LORAZEPAM 0.5 MG: 2 INJECTION INTRAMUSCULAR; INTRAVENOUS at 05:33

## 2024-01-01 RX ADMIN — LORAZEPAM 0.5 MG: 2 INJECTION INTRAMUSCULAR; INTRAVENOUS at 19:07

## 2024-01-01 RX ADMIN — LORAZEPAM 0.5 MG: 2 INJECTION INTRAMUSCULAR; INTRAVENOUS at 00:49

## 2024-01-01 RX ADMIN — LORAZEPAM 1 MG: 2 INJECTION INTRAMUSCULAR; INTRAVENOUS at 04:43

## 2024-01-01 RX ADMIN — SODIUM CHLORIDE: 9 INJECTION, SOLUTION INTRAVENOUS at 04:46

## 2024-01-01 RX ADMIN — SODIUM CHLORIDE: 9 INJECTION, SOLUTION INTRAVENOUS at 18:06

## 2024-01-01 RX ADMIN — Medication 3 MG/HR: at 23:21

## 2024-01-01 RX ADMIN — LORAZEPAM 1 MG: 2 INJECTION INTRAMUSCULAR; INTRAVENOUS at 13:01

## 2024-01-01 RX ADMIN — FENTANYL CITRATE 50 MCG: 50 INJECTION INTRAMUSCULAR; INTRAVENOUS at 19:01

## 2024-01-01 RX ADMIN — POTASSIUM CHLORIDE 10 MEQ: 7.46 INJECTION, SOLUTION INTRAVENOUS at 01:07

## 2024-01-01 RX ADMIN — LORAZEPAM 1 MG: 2 INJECTION INTRAMUSCULAR; INTRAVENOUS at 03:12

## 2024-01-01 RX ADMIN — LORAZEPAM 1 MG: 2 INJECTION INTRAMUSCULAR; INTRAVENOUS at 14:39

## 2024-01-01 RX ADMIN — FENTANYL CITRATE 25 MCG: 50 INJECTION INTRAMUSCULAR; INTRAVENOUS at 18:19

## 2024-01-01 RX ADMIN — MORPHINE SULFATE 10 MG: 10 INJECTION, SOLUTION INTRAMUSCULAR; INTRAVENOUS at 14:44

## 2024-01-01 RX ADMIN — SODIUM CHLORIDE, POTASSIUM CHLORIDE, SODIUM LACTATE AND CALCIUM CHLORIDE 500 ML: 600; 310; 30; 20 INJECTION, SOLUTION INTRAVENOUS at 01:08

## 2024-01-01 RX ADMIN — POTASSIUM CHLORIDE 10 MEQ: 7.46 INJECTION, SOLUTION INTRAVENOUS at 22:02

## 2024-01-01 RX ADMIN — DEXTROSE MONOHYDRATE: 50 INJECTION, SOLUTION INTRAVENOUS at 05:29

## 2024-01-01 RX ADMIN — LORAZEPAM 0.5 MG: 2 INJECTION INTRAMUSCULAR; INTRAVENOUS at 13:21

## 2024-01-01 RX ADMIN — MORPHINE SULFATE 10 MG: 10 INJECTION, SOLUTION INTRAMUSCULAR; INTRAVENOUS at 23:39

## 2024-01-01 RX ADMIN — LORAZEPAM 0.5 MG: 2 INJECTION INTRAMUSCULAR; INTRAVENOUS at 18:23

## 2024-01-01 RX ADMIN — MORPHINE SULFATE 10 MG: 10 INJECTION, SOLUTION INTRAMUSCULAR; INTRAVENOUS at 12:08

## 2024-01-01 RX ADMIN — FENTANYL CITRATE 50 MCG: 50 INJECTION, SOLUTION INTRAMUSCULAR; INTRAVENOUS at 01:18

## 2024-01-01 RX ADMIN — LORAZEPAM 1 MG: 2 INJECTION INTRAMUSCULAR; INTRAVENOUS at 07:48

## 2024-01-01 RX ADMIN — HALOPERIDOL LACTATE 1 MG: 5 INJECTION, SOLUTION INTRAMUSCULAR at 10:09

## 2024-01-01 RX ADMIN — MORPHINE SULFATE 10 MG: 10 INJECTION, SOLUTION INTRAMUSCULAR; INTRAVENOUS at 23:20

## 2024-01-01 RX ADMIN — SODIUM CHLORIDE 1000 ML: 9 INJECTION, SOLUTION INTRAVENOUS at 02:22

## 2024-01-01 RX ADMIN — PIPERACILLIN AND TAZOBACTAM 2.25 G: 2; .25 INJECTION, POWDER, FOR SOLUTION INTRAVENOUS at 00:10

## 2024-01-01 RX ADMIN — MORPHINE SULFATE 10 MG: 10 INJECTION, SOLUTION INTRAMUSCULAR; INTRAVENOUS at 03:35

## 2024-01-01 RX ADMIN — MORPHINE SULFATE 10 MG: 20 SOLUTION ORAL at 10:12

## 2024-01-01 RX ADMIN — MICONAZOLE NITRATE: 20 POWDER TOPICAL at 21:00

## 2024-01-01 RX ADMIN — FENTANYL CITRATE 25 MCG: 50 INJECTION INTRAMUSCULAR; INTRAVENOUS at 14:54

## 2024-01-01 RX ADMIN — GLYCOPYRROLATE 0.2 MG: 0.2 INJECTION INTRAMUSCULAR; INTRAVENOUS at 00:24

## 2024-01-01 ASSESSMENT — ACTIVITIES OF DAILY LIVING (ADL)
ADLS_ACUITY_SCORE: 70
ADLS_ACUITY_SCORE: 61
ADLS_ACUITY_SCORE: 52
ADLS_ACUITY_SCORE: 61
ADLS_ACUITY_SCORE: 56
ADLS_ACUITY_SCORE: 56
ADLS_ACUITY_SCORE: 52
ADLS_ACUITY_SCORE: 61
ADLS_ACUITY_SCORE: 61
ADLS_ACUITY_SCORE: 66
ADLS_ACUITY_SCORE: 61
ADLS_ACUITY_SCORE: 61
ADLS_ACUITY_SCORE: 52
ADLS_ACUITY_SCORE: 52
ADLS_ACUITY_SCORE: 61
ADLS_ACUITY_SCORE: 52
ADLS_ACUITY_SCORE: 61
ADLS_ACUITY_SCORE: 61
ADLS_ACUITY_SCORE: 52
ADLS_ACUITY_SCORE: 40
ADLS_ACUITY_SCORE: 52
ADLS_ACUITY_SCORE: 61
ADLS_ACUITY_SCORE: 61
ADLS_ACUITY_SCORE: 56
ADLS_ACUITY_SCORE: 52
ADLS_ACUITY_SCORE: 52
ADLS_ACUITY_SCORE: 70
ADLS_ACUITY_SCORE: 52
ADLS_ACUITY_SCORE: 40
ADLS_ACUITY_SCORE: 59
ADLS_ACUITY_SCORE: 61
ADLS_ACUITY_SCORE: 52
ADLS_ACUITY_SCORE: 61
ADLS_ACUITY_SCORE: 56
ADLS_ACUITY_SCORE: 69
ADLS_ACUITY_SCORE: 61
ADLS_ACUITY_SCORE: 61
ADLS_ACUITY_SCORE: 52
ADLS_ACUITY_SCORE: 61
ADLS_ACUITY_SCORE: 52
ADLS_ACUITY_SCORE: 40
ADLS_ACUITY_SCORE: 40
ADLS_ACUITY_SCORE: 52
ADLS_ACUITY_SCORE: 56
ADLS_ACUITY_SCORE: 61
ADLS_ACUITY_SCORE: 70
ADLS_ACUITY_SCORE: 61
ADLS_ACUITY_SCORE: 40
ADLS_ACUITY_SCORE: 61
ADLS_ACUITY_SCORE: 68
ADLS_ACUITY_SCORE: 61
ADLS_ACUITY_SCORE: 66
ADLS_ACUITY_SCORE: 61
ADLS_ACUITY_SCORE: 40
ADLS_ACUITY_SCORE: 61
ADLS_ACUITY_SCORE: 52
ADLS_ACUITY_SCORE: 61
ADLS_ACUITY_SCORE: 68
ADLS_ACUITY_SCORE: 61
ADLS_ACUITY_SCORE: 66
ADLS_ACUITY_SCORE: 52
ADLS_ACUITY_SCORE: 56
ADLS_ACUITY_SCORE: 40
ADLS_ACUITY_SCORE: 61
ADLS_ACUITY_SCORE: 70
ADLS_ACUITY_SCORE: 61
ADLS_ACUITY_SCORE: 52
ADLS_ACUITY_SCORE: 61
ADLS_ACUITY_SCORE: 56
ADLS_ACUITY_SCORE: 61
ADLS_ACUITY_SCORE: 66
ADLS_ACUITY_SCORE: 56
ADLS_ACUITY_SCORE: 55
ADLS_ACUITY_SCORE: 56
ADLS_ACUITY_SCORE: 61
ADLS_ACUITY_SCORE: 61
ADLS_ACUITY_SCORE: 40
ADLS_ACUITY_SCORE: 56
ADLS_ACUITY_SCORE: 70
ADLS_ACUITY_SCORE: 61
ADLS_ACUITY_SCORE: 66
ADLS_ACUITY_SCORE: 61
ADLS_ACUITY_SCORE: 52
ADLS_ACUITY_SCORE: 70
ADLS_ACUITY_SCORE: 61
ADLS_ACUITY_SCORE: 61
ADLS_ACUITY_SCORE: 69
ADLS_ACUITY_SCORE: 61
ADLS_ACUITY_SCORE: 66
ADLS_ACUITY_SCORE: 61
ADLS_ACUITY_SCORE: 61
ADLS_ACUITY_SCORE: 56
ADLS_ACUITY_SCORE: 66
ADLS_ACUITY_SCORE: 52
ADLS_ACUITY_SCORE: 61
ADLS_ACUITY_SCORE: 66
ADLS_ACUITY_SCORE: 61
ADLS_ACUITY_SCORE: 52
ADLS_ACUITY_SCORE: 52
ADLS_ACUITY_SCORE: 56
ADLS_ACUITY_SCORE: 61
ADLS_ACUITY_SCORE: 69
ADLS_ACUITY_SCORE: 40
ADLS_ACUITY_SCORE: 52
ADLS_ACUITY_SCORE: 61
ADLS_ACUITY_SCORE: 70
ADLS_ACUITY_SCORE: 68
ADLS_ACUITY_SCORE: 52
ADLS_ACUITY_SCORE: 40
ADLS_ACUITY_SCORE: 56
ADLS_ACUITY_SCORE: 52
ADLS_ACUITY_SCORE: 40
ADLS_ACUITY_SCORE: 61
ADLS_ACUITY_SCORE: 40
ADLS_ACUITY_SCORE: 70
ADLS_ACUITY_SCORE: 61
ADLS_ACUITY_SCORE: 52
ADLS_ACUITY_SCORE: 56
ADLS_ACUITY_SCORE: 69
ADLS_ACUITY_SCORE: 61
ADLS_ACUITY_SCORE: 61
ADLS_ACUITY_SCORE: 70
ADLS_ACUITY_SCORE: 61
ADLS_ACUITY_SCORE: 52
ADLS_ACUITY_SCORE: 52
ADLS_ACUITY_SCORE: 61
ADLS_ACUITY_SCORE: 66
ADLS_ACUITY_SCORE: 61
ADLS_ACUITY_SCORE: 52
ADLS_ACUITY_SCORE: 61
ADLS_ACUITY_SCORE: 61
ADLS_ACUITY_SCORE: 52
ADLS_ACUITY_SCORE: 61
ADLS_ACUITY_SCORE: 52
ADLS_ACUITY_SCORE: 52
ADLS_ACUITY_SCORE: 40
ADLS_ACUITY_SCORE: 70
ADLS_ACUITY_SCORE: 61
ADLS_ACUITY_SCORE: 40
ADLS_ACUITY_SCORE: 66
ADLS_ACUITY_SCORE: 61
ADLS_ACUITY_SCORE: 74
ADLS_ACUITY_SCORE: 52
ADLS_ACUITY_SCORE: 61
ADLS_ACUITY_SCORE: 61
ADLS_ACUITY_SCORE: 52
ADLS_ACUITY_SCORE: 56
ADLS_ACUITY_SCORE: 52
ADLS_ACUITY_SCORE: 69
ADLS_ACUITY_SCORE: 61
ADLS_ACUITY_SCORE: 56
ADLS_ACUITY_SCORE: 52
ADLS_ACUITY_SCORE: 52
ADLS_ACUITY_SCORE: 41
ADLS_ACUITY_SCORE: 68
ADLS_ACUITY_SCORE: 61
ADLS_ACUITY_SCORE: 52
ADLS_ACUITY_SCORE: 61
ADLS_ACUITY_SCORE: 52
ADLS_ACUITY_SCORE: 56
ADLS_ACUITY_SCORE: 52
ADLS_ACUITY_SCORE: 61
ADLS_ACUITY_SCORE: 56
ADLS_ACUITY_SCORE: 40
ADLS_ACUITY_SCORE: 61
ADLS_ACUITY_SCORE: 52
ADLS_ACUITY_SCORE: 66
ADLS_ACUITY_SCORE: 61
ADLS_ACUITY_SCORE: 56
ADLS_ACUITY_SCORE: 61
ADLS_ACUITY_SCORE: 52
ADLS_ACUITY_SCORE: 61
ADLS_ACUITY_SCORE: 52
ADLS_ACUITY_SCORE: 40
ADLS_ACUITY_SCORE: 69
ADLS_ACUITY_SCORE: 70
ADLS_ACUITY_SCORE: 66
ADLS_ACUITY_SCORE: 56
ADLS_ACUITY_SCORE: 56
ADLS_ACUITY_SCORE: 61
ADLS_ACUITY_SCORE: 61
ADLS_ACUITY_SCORE: 69
ADLS_ACUITY_SCORE: 61
ADLS_ACUITY_SCORE: 41
ADLS_ACUITY_SCORE: 61
ADLS_ACUITY_SCORE: 61
ADLS_ACUITY_SCORE: 70
ADLS_ACUITY_SCORE: 52
ADLS_ACUITY_SCORE: 61
ADLS_ACUITY_SCORE: 66
ADLS_ACUITY_SCORE: 52
ADLS_ACUITY_SCORE: 70
ADLS_ACUITY_SCORE: 66
ADLS_ACUITY_SCORE: 61
ADLS_ACUITY_SCORE: 56
ADLS_ACUITY_SCORE: 56
ADLS_ACUITY_SCORE: 61
ADLS_ACUITY_SCORE: 52
ADLS_ACUITY_SCORE: 70
ADLS_ACUITY_SCORE: 61
ADLS_ACUITY_SCORE: 56
ADLS_ACUITY_SCORE: 70
ADLS_ACUITY_SCORE: 61
ADLS_ACUITY_SCORE: 68
ADLS_ACUITY_SCORE: 61
ADLS_ACUITY_SCORE: 56
ADLS_ACUITY_SCORE: 70
ADLS_ACUITY_SCORE: 70
ADLS_ACUITY_SCORE: 61
ADLS_ACUITY_SCORE: 59
ADLS_ACUITY_SCORE: 40
ADLS_ACUITY_SCORE: 70
ADLS_ACUITY_SCORE: 59
ADLS_ACUITY_SCORE: 70
ADLS_ACUITY_SCORE: 56
ADLS_ACUITY_SCORE: 61
ADLS_ACUITY_SCORE: 52
ADLS_ACUITY_SCORE: 52
ADLS_ACUITY_SCORE: 59
ADLS_ACUITY_SCORE: 61
ADLS_ACUITY_SCORE: 56
ADLS_ACUITY_SCORE: 61
ADLS_ACUITY_SCORE: 52
ADLS_ACUITY_SCORE: 70
ADLS_ACUITY_SCORE: 56
ADLS_ACUITY_SCORE: 70
ADLS_ACUITY_SCORE: 61
ADLS_ACUITY_SCORE: 40
ADLS_ACUITY_SCORE: 61
ADLS_ACUITY_SCORE: 70
ADLS_ACUITY_SCORE: 70
ADLS_ACUITY_SCORE: 61
ADLS_ACUITY_SCORE: 61
ADLS_ACUITY_SCORE: 52
ADLS_ACUITY_SCORE: 40
ADLS_ACUITY_SCORE: 70
ADLS_ACUITY_SCORE: 68
ADLS_ACUITY_SCORE: 61
ADLS_ACUITY_SCORE: 56
ADLS_ACUITY_SCORE: 70
ADLS_ACUITY_SCORE: 61
ADLS_ACUITY_SCORE: 40
ADLS_ACUITY_SCORE: 61
ADLS_ACUITY_SCORE: 52
ADLS_ACUITY_SCORE: 70
ADLS_ACUITY_SCORE: 56
ADLS_ACUITY_SCORE: 61
ADLS_ACUITY_SCORE: 61
ADLS_ACUITY_SCORE: 56
ADLS_ACUITY_SCORE: 40
ADLS_ACUITY_SCORE: 61
ADLS_ACUITY_SCORE: 61
ADLS_ACUITY_SCORE: 56
ADLS_ACUITY_SCORE: 56
ADLS_ACUITY_SCORE: 52
ADLS_ACUITY_SCORE: 61
ADLS_ACUITY_SCORE: 66
ADLS_ACUITY_SCORE: 61
ADLS_ACUITY_SCORE: 52
ADLS_ACUITY_SCORE: 40
ADLS_ACUITY_SCORE: 61
ADLS_ACUITY_SCORE: 52
ADLS_ACUITY_SCORE: 40
ADLS_ACUITY_SCORE: 61
ADLS_ACUITY_SCORE: 69
ADLS_ACUITY_SCORE: 52
ADLS_ACUITY_SCORE: 70
ADLS_ACUITY_SCORE: 56
ADLS_ACUITY_SCORE: 52
ADLS_ACUITY_SCORE: 61
ADLS_ACUITY_SCORE: 61
ADLS_ACUITY_SCORE: 70
ADLS_ACUITY_SCORE: 56
ADLS_ACUITY_SCORE: 40
ADLS_ACUITY_SCORE: 70
ADLS_ACUITY_SCORE: 61
ADLS_ACUITY_SCORE: 68
ADLS_ACUITY_SCORE: 68
ADLS_ACUITY_SCORE: 52
ADLS_ACUITY_SCORE: 61
ADLS_ACUITY_SCORE: 61
ADLS_ACUITY_SCORE: 52
ADLS_ACUITY_SCORE: 61
ADLS_ACUITY_SCORE: 56
ADLS_ACUITY_SCORE: 59
ADLS_ACUITY_SCORE: 40
ADLS_ACUITY_SCORE: 64
ADLS_ACUITY_SCORE: 61
ADLS_ACUITY_SCORE: 70
ADLS_ACUITY_SCORE: 41
ADLS_ACUITY_SCORE: 56
ADLS_ACUITY_SCORE: 61
ADLS_ACUITY_SCORE: 41
ADLS_ACUITY_SCORE: 70
ADLS_ACUITY_SCORE: 61
ADLS_ACUITY_SCORE: 66
ADLS_ACUITY_SCORE: 61
ADLS_ACUITY_SCORE: 56
ADLS_ACUITY_SCORE: 61
ADLS_ACUITY_SCORE: 52
ADLS_ACUITY_SCORE: 69
ADLS_ACUITY_SCORE: 52
ADLS_ACUITY_SCORE: 55
ADLS_ACUITY_SCORE: 56
ADLS_ACUITY_SCORE: 70
ADLS_ACUITY_SCORE: 52
ADLS_ACUITY_SCORE: 70
ADLS_ACUITY_SCORE: 52
ADLS_ACUITY_SCORE: 52
ADLS_ACUITY_SCORE: 61
ADLS_ACUITY_SCORE: 68
ADLS_ACUITY_SCORE: 40
ADLS_ACUITY_SCORE: 61
ADLS_ACUITY_SCORE: 52
ADLS_ACUITY_SCORE: 56
ADLS_ACUITY_SCORE: 68
ADLS_ACUITY_SCORE: 55
ADLS_ACUITY_SCORE: 40
ADLS_ACUITY_SCORE: 66
ADLS_ACUITY_SCORE: 52
ADLS_ACUITY_SCORE: 40
ADLS_ACUITY_SCORE: 69
ADLS_ACUITY_SCORE: 52
ADLS_ACUITY_SCORE: 70
ADLS_ACUITY_SCORE: 61
ADLS_ACUITY_SCORE: 56
ADLS_ACUITY_SCORE: 61
ADLS_ACUITY_SCORE: 61
ADLS_ACUITY_SCORE: 70
ADLS_ACUITY_SCORE: 61
ADLS_ACUITY_SCORE: 66
ADLS_ACUITY_SCORE: 56
ADLS_ACUITY_SCORE: 61
ADLS_ACUITY_SCORE: 52
ADLS_ACUITY_SCORE: 52
ADLS_ACUITY_SCORE: 61
ADLS_ACUITY_SCORE: 56
ADLS_ACUITY_SCORE: 52
ADLS_ACUITY_SCORE: 52
ADLS_ACUITY_SCORE: 69
ADLS_ACUITY_SCORE: 61
ADLS_ACUITY_SCORE: 61
ADLS_ACUITY_SCORE: 52
ADLS_ACUITY_SCORE: 56
ADLS_ACUITY_SCORE: 61
ADLS_ACUITY_SCORE: 61
ADLS_ACUITY_SCORE: 52
ADLS_ACUITY_SCORE: 52
ADLS_ACUITY_SCORE: 40
ADLS_ACUITY_SCORE: 61
ADLS_ACUITY_SCORE: 55
ADLS_ACUITY_SCORE: 61
ADLS_ACUITY_SCORE: 52
ADLS_ACUITY_SCORE: 68
ADLS_ACUITY_SCORE: 40
ADLS_ACUITY_SCORE: 70
ADLS_ACUITY_SCORE: 64
ADLS_ACUITY_SCORE: 56
ADLS_ACUITY_SCORE: 61
ADLS_ACUITY_SCORE: 61
ADLS_ACUITY_SCORE: 40
ADLS_ACUITY_SCORE: 59
ADLS_ACUITY_SCORE: 61
ADLS_ACUITY_SCORE: 68
ADLS_ACUITY_SCORE: 52
ADLS_ACUITY_SCORE: 66
ADLS_ACUITY_SCORE: 40
ADLS_ACUITY_SCORE: 61
ADLS_ACUITY_SCORE: 52
ADLS_ACUITY_SCORE: 52
ADLS_ACUITY_SCORE: 70
ADLS_ACUITY_SCORE: 52
ADLS_ACUITY_SCORE: 69
ADLS_ACUITY_SCORE: 56
ADLS_ACUITY_SCORE: 70
ADLS_ACUITY_SCORE: 56
ADLS_ACUITY_SCORE: 61
ADLS_ACUITY_SCORE: 52
ADLS_ACUITY_SCORE: 61
ADLS_ACUITY_SCORE: 69
ADLS_ACUITY_SCORE: 56
ADLS_ACUITY_SCORE: 61
ADLS_ACUITY_SCORE: 61
ADLS_ACUITY_SCORE: 70
ADLS_ACUITY_SCORE: 70
ADLS_ACUITY_SCORE: 61
ADLS_ACUITY_SCORE: 40
ADLS_ACUITY_SCORE: 52
ADLS_ACUITY_SCORE: 61
ADLS_ACUITY_SCORE: 59
ADLS_ACUITY_SCORE: 61
ADLS_ACUITY_SCORE: 41
ADLS_ACUITY_SCORE: 61
ADLS_ACUITY_SCORE: 70
ADLS_ACUITY_SCORE: 56
ADLS_ACUITY_SCORE: 56
ADLS_ACUITY_SCORE: 52
ADLS_ACUITY_SCORE: 41
ADLS_ACUITY_SCORE: 70
ADLS_ACUITY_SCORE: 61
ADLS_ACUITY_SCORE: 52
ADLS_ACUITY_SCORE: 59
ADLS_ACUITY_SCORE: 61
ADLS_ACUITY_SCORE: 52
ADLS_ACUITY_SCORE: 40
ADLS_ACUITY_SCORE: 68
ADLS_ACUITY_SCORE: 70
ADLS_ACUITY_SCORE: 61
ADLS_ACUITY_SCORE: 64
ADLS_ACUITY_SCORE: 52
ADLS_ACUITY_SCORE: 61
ADLS_ACUITY_SCORE: 61
ADLS_ACUITY_SCORE: 40
ADLS_ACUITY_SCORE: 69
ADLS_ACUITY_SCORE: 59
ADLS_ACUITY_SCORE: 56
DEPENDENT_IADLS:: CLEANING;COOKING;LAUNDRY;SHOPPING;MEAL PREPARATION;MEDICATION MANAGEMENT;MONEY MANAGEMENT;TRANSPORTATION
ADLS_ACUITY_SCORE: 52
ADLS_ACUITY_SCORE: 56
ADLS_ACUITY_SCORE: 61
ADLS_ACUITY_SCORE: 56
ADLS_ACUITY_SCORE: 61
ADLS_ACUITY_SCORE: 56
ADLS_ACUITY_SCORE: 52
ADLS_ACUITY_SCORE: 61
ADLS_ACUITY_SCORE: 52
ADLS_ACUITY_SCORE: 61
ADLS_ACUITY_SCORE: 41
ADLS_ACUITY_SCORE: 56
ADLS_ACUITY_SCORE: 70
ADLS_ACUITY_SCORE: 40

## 2024-01-02 NOTE — PROGRESS NOTES
Type of Form Received:     Form Received (Date) 1/2/24   Form Filled out Yes, faxed 1/4   Placed in provider folder Yes

## 2024-01-05 NOTE — LETTER
1/5/2024       RE: Noe Florence  [address] [facility] [address]     Dear Colleague,    Thank you for referring your patient, Noe Florence, to the [facility] [place] [facility]. Please see a copy of my visit note below.    VASCULAR MEDICINE PROGRESS NOTE          LOCATION:  [facility] CL[facility]        Date of Service: 1/5/2024      Primary Care Provider: Roberto Sarmiento      Reason for the visit/chief complaint:   Follow up on venous insufficiency and iliac artery aneurysm       Subjective:  Noe Florence is a pleasant 88 year old male who presents to our Vascular Medicine clinic accompanied by his significant other Rica and his caregiver Bandar.    Recall that he has extensive past medical history significant for left lower extremity DVT (left popliteal vein July 2019, left mid femoral vein March 2019)-unclear circumstances, during recurrent hospitalizations?,  CAD status post CABG x1 in 1994 and JEREMY-LCx 2012, atrial fibrillation on warfarin, Vtach s/p ICD, left common iliac artery aneurysm, CKD stage III, MGUS, polymyalgia rheumatica and history of colon cancer status post left colectomy 2019     I first met with them 3 months ago 10/13 for lower extremity swelling and venous insufficiency.  He was recommended to start wearing compression stockings to both lower extremities and elevate his legs.  He remains mostly nonambulatory.  We requested follow-up ultrasound aorta iliac to follow-up on his left SHAAN aneurysm.    Subsequently, he developed lower extremity wounds with concern for peripheral arterial disease for which he was seen by our colleague and vascular surgery/Dr. Ellison 11/15/2023.  At that time, repeat upper extremity aortic iliac reported his left SHAAN to be 3.5 cm from 2.5 cm in 2021.  His toe wounds were felt to be pressure wounds with no  hemodynamically significant arterial stenosis to be targeted.  Given his multiple comorbidities, the cutoff for SHAAN repair was pushed to > 4 cm with recommendation to continue medical management.  CTA abdomen pelvis was recommended to further evaluate his anatomy and this measured the left SHAAN aneurysm at 3.1 x 3.0 x 3.1 cm (previously 2.4 x 3.0 x 2.8 cm in 2021).  He has no abdominal aortic aneurysm.    The patient in the interim was hospitalized for 2 weeks between 12/8 - 12/21 for left foot second toe osteomyelitis.  He was treated only medically with no surgical intervention recommended.  He is currently on oral antibiotics planned for 6 weeks wound care recommendations were provided during hospitalization.  They have follow-up with ID and podiatry.    As previously, Mr. Florence contributes very minimally to the conversation.  The majority of the history is taken from his significant other Rica and his caregiver Bandar in addition to chart review.    After his hospitalization, they were instructed not to have him wear compression stockings with his toe wounds.  They have not been doing any other type of compression therapy.  His swelling however improved significantly during his hospitalization with his legs being elevated and possibly wrapped?.      Past medical history, surgical history, medications, family history, social history and allergies were reviewed. Pertinent points mentioned under HPI.        OBJECTIVE:    Vital signs:  /58 (BP Location: Left arm, Patient Position: Left side, Cuff Size: Adult Regular)   Pulse 92   SpO2 91%   Wt Readings from Last 1 Encounters:   12/08/23 155 lb (70.3 kg)     There is no height or weight on file to calculate BMI.    Physical exam:  General appearance: Pleasant male in no apparent distress, sitting up in a wheelchair, minimally conversant.    Extremities: Bilateral lower extremities with improved swelling however continues with pitting swelling more seen on the  left lower extremity.  Dressing applied to left lower extremity foot that was not removed. PT and DP unable to palpate 0/2 bilaterally.       DIAGNOSTIC STUDIES:   Labs and diagnostics reviewed including outside records. Pertinent points are mentioned under HPI and assessment and plan sections.        ASSESSMENT AND PLAN:    Bilateral lower extremity edema secondary to venous stasis and immobility  Asymptomatic left common iliac artery aneurysm 3.1 cm  Left second toe osteomyelitis on antibiotics  History of left lower extremity DVT: Provoked?  Currently on warfarin  Atrial fibrillation/flutter status post previous a flutter ablation on warfarin  Status post PPM  CAD status post 1 vessel CABG in 1994 and JEREMY-LCx 2  Remote smoking history quit approximately 55 years ago                                                                                                         Recommendations:  Discussed today to continue lower extremity compression therapy however would not do stockings while having active wounds and would rather do short stretch wraps.  This was demonstrated to the patient's significant other and caregiver today by RN.  Remove wraps before going to bed at night.  Discussed to keep lower extremities elevated to help with the swelling.   With regards to his SHAAN aneurysm, we will obtain ultrasound aortoiliac in 6 months expected June 2024 with follow-up visit.  Continue current medical management,  no antiplatelets while on warfarin, atorvastatin 40 mg (LDL less than 70).  Blood pressure under good control.  Encouraged to follow-up with podiatry and infectious disease with regards to his osteomyelitis.    Pleasure meeting again with Ms. Florence, his significant other and his caregiver.        Again, thank you for allowing me to participate in the care of your patient.      Sincerely,    Rajiv Pollock MD

## 2024-01-05 NOTE — PATIENT INSTRUCTIONS
Thank you so much for choosing us for your care. It was a pleasure to see you at the vascular clinic today.     Follow-up recommendations: We will see you back in June. Please do not hesitate to reach out to us in the meantime with any concerns. Our schedulers will get in touch with you to set up your follow-up visit.    Additional testing/imaging ordered today: Aortic ultrasound in June prior to seeing provider.      Our scheduling team will get in touch with you to set up any follow-up testing/imaging and/or appointments. Please be aware that any testing/imaging recommended today will need to completed prior to your next visit with the provider. If testing/imaging is not completed prior to your next visit, your visit may be rescheduled.        If you have any questions, please contact our clinic directly at [phone] and ask for the nurse. We also encourage the use of [employer] to communicate with your HealthCare Provider.      If you have an urgent need after business hours (8:00 am to 4:30 pm) please call [phone], option 4, and ask for the vascular attending on call. For non-urgent after hours needs, please call the vascular clinic at [phone]. For scheduling needs, please call our clinic directly at [phone].    Learning About BE FAST: Stroke Warning Signs  BE FAST is a simple way to remember the main symptoms of stroke. These symptoms happen suddenly. So learning what to look for helps you know when to call for medical help. BE FAST stands for:    B - Balance.  Loss of balance or trouble walking.     E - Eyes.  Trouble seeing out of one or both eyes.     F - Face.  Weakness or drooping on one side of the face.     A - Arm.  Weakness or numbness in an arm or leg.     S - Speech.  Trouble speaking.     T - Time to call 911.  Also call 911 if you have other stroke symptoms. They include:  Sudden confusion.  Sudden trouble understanding simple statements.  Fainting.  A seizure.  A sudden, severe  "headache.     A stroke happens when a blood vessel in the brain bursts or is blocked by a blood clot. The blood supply to part of the brain--and the oxygen the blood carries--is reduced. This damages the brain.   If you have a stroke, quick treatment may save your life. And it may reduce the damage in your brain so that you have fewer problems after the stroke.   Where can you learn more?  Go to https://www.[facility].net/patiented  Enter E640 in the search box to learn more about \"Learning About BE FAST: Stroke Warning Signs.\"  Current as of: December 18, 2022               Content Version: 13.8    6204-7692 [employer].   Care instructions adapted under license by your healthcare professional. If you have questions about a medical condition or this instruction, always ask your healthcare professional. [employer] disclaims any warranty or liability for your use of this information.    Learning About How to Prevent a Stroke  What is a stroke?  A stroke is damage to the brain that occurs when a blood vessel in the brain bursts or is blocked by a blood clot. Without blood and the oxygen it carries, part of the brain starts to die. The part of the body controlled by the damaged area of the brain can't work properly.  Brain damage can start within minutes of a stroke. But quick treatment can help limit the damage and increase the chance of a full recovery.  What puts you at risk for stroke?  A risk factor is anything that makes you more likely to have a particular health problem.  Risk factors for stroke that you can manage or change include:  Health problems like atrial fibrillation, diabetes, high blood pressure, high cholesterol, hardening of the arteries (atherosclerosis), and sickle cell disease.  Smoking.  Drinking more than 2 alcoholic drinks a day for men and 1 drink a day for women.  Being overweight.  Not eating healthy foods.  Not getting enough physical activity.  Risk factors you " can't change include:  Having a previous stroke.  Family history of stroke.  Being older.  Being , [place] Native, , or South  American.  Being female.  Having certain problems during pregnancy, such as preeclampsia.  Being past menopause.  Your doctor can help you know your risk. Then you and your doctor can talk about whether to take steps to lower it.  How can you help prevent a stroke?  Here are some things you can do to help prevent a stroke.  Manage health problems that raise your risk. These include atrial fibrillation, diabetes, high blood pressure, and high cholesterol.  Have a heart-healthy lifestyle.  Don't smoke. If you need help quitting, talk to your doctor about stop-smoking programs and medicines. These can increase your chances of quitting for good.  Limit alcohol to 2 drinks a day for men and 1 drink a day for women.  Stay at a healthy weight. Lose weight if you need to.  Be active. Get at least 30 minutes of exercise on most days of the week. Walking is a good choice. You also may want to do other activities, such as running, swimming, cycling, or playing tennis or team sports.  Eat heart-healthy foods. These include vegetables, fruits, nuts, beans, lean meat, fish, and whole grains. Limit sodium and sugar.  If you think you may have a problem with alcohol or drug use, talk to your doctor.  If you use hormone therapy for menopause or hormonal birth control, talk with your doctor. Ask if these are right for you. They may raise the risk of stroke in some people.  Decide with your doctor whether you will also take medicines to help lower your risk. For example, you and your doctor may decide you will take a medicine that prevents blood clots.  What are the BE FAST stroke warning signs?  BE FAST is a simple way to remember the main symptoms of stroke. These symptoms happen suddenly. So knowing what to look for helps you know when to call for medical help.  BE FAST  "stands for:  B alance. Loss of balance or trouble walking.  E yes. Trouble seeing out of one or both eyes.  F ace. Weakness or drooping on one side of the face.  A rm. Weakness or numbness in an arm or leg.  S peech. Trouble speaking.  T bishnu to call 911.  Other stroke symptoms include sudden confusion, sudden trouble understanding simple statements, fainting, a seizure, and a sudden, severe headache.  What are the symptoms of a stroke?  Symptoms of a stroke happen quickly. A stroke may cause:  Sudden numbness, tingling, weakness, or loss of movement in your face, arm, or leg, especially on only one side of your body.  Sudden vision changes.  Sudden trouble speaking.  Sudden confusion or trouble understanding simple statements.  Sudden problems with walking or balance.  A sudden, severe headache that is different from past headaches.  Fainting.  A seizure.  It's important to call for medical help if you have stroke symptoms. Quick treatment may save your life. And it may reduce the damage in your brain so that you have fewer problems after the stroke.  Follow-up care is a key part of your treatment and safety. Be sure to make and go to all appointments, and call your doctor if you are having problems. It's also a good idea to know your test results and keep a list of the medicines you take.  Where can you learn more?  Go to https://www.[facility].net/patiented  Enter G757 in the search box to learn more about \"Learning About How to Prevent a Stroke.\"  Current as of: December 18, 2022               Content Version: 13.8    8720-6948 [employer].   Care instructions adapted under license by your healthcare professional. If you have questions about a medical condition or this instruction, always ask your healthcare professional. [employer] disclaims any warranty or liability for your use of this information.          "

## 2024-01-08 NOTE — PROGRESS NOTES
VASCULAR MEDICINE PROGRESS NOTE          LOCATION:  [facility]        Date of Service: 1/5/2024      Primary Care Provider: Roberto Sarmiento      Reason for the visit/chief complaint:   Follow up on venous insufficiency and iliac artery aneurysm       Subjective:  Noe Florence is a pleasant 88 year old male who presents to our Vascular Medicine clinic accompanied by his significant other Rica and his caregiver Bandar.    Recall that he has extensive past medical history significant for left lower extremity DVT (left popliteal vein July 2019, left mid femoral vein March 2019)-unclear circumstances, during recurrent hospitalizations?,  CAD status post CABG x1 in 1994 and JEREMY-LCx 2012, atrial fibrillation on warfarin, Vtach s/p ICD, left common iliac artery aneurysm, CKD stage III, MGUS, polymyalgia rheumatica and history of colon cancer status post left colectomy 2019     I first met with them 3 months ago 10/13 for lower extremity swelling and venous insufficiency.  He was recommended to start wearing compression stockings to both lower extremities and elevate his legs.  He remains mostly nonambulatory.  We requested follow-up ultrasound aorta iliac to follow-up on his left SHAAN aneurysm.    Subsequently, he developed lower extremity wounds with concern for peripheral arterial disease for which he was seen by our colleague and vascular surgery/Dr. Ellison 11/15/2023.  At that time, repeat upper extremity aortic iliac reported his left SHAAN to be 3.5 cm from 2.5 cm in 2021.  His toe wounds were felt to be pressure wounds with no hemodynamically significant arterial stenosis to be targeted.  Given his multiple comorbidities, the cutoff for SHAAN repair was pushed to > 4 cm with recommendation to continue medical management.  CTA abdomen pelvis was recommended to further evaluate his anatomy and this measured the left SHAAN aneurysm at 3.1 x 3.0 x 3.1  cm (previously 2.4 x 3.0 x 2.8 cm in 2021).  He has no abdominal aortic aneurysm.    The patient in the interim was hospitalized for 2 weeks between 12/8 - 12/21 for left foot second toe osteomyelitis.  He was treated only medically with no surgical intervention recommended.  He is currently on oral antibiotics planned for 6 weeks wound care recommendations were provided during hospitalization.  They have follow-up with ID and podiatry.    As previously, Mr. Florence contributes very minimally to the conversation.  The majority of the history is taken from his significant other Rica and his caregiver Bandar in addition to chart review.    After his hospitalization, they were instructed not to have him wear compression stockings with his toe wounds.  They have not been doing any other type of compression therapy.  His swelling however improved significantly during his hospitalization with his legs being elevated and possibly wrapped?.      Past medical history, surgical history, medications, family history, social history and allergies were reviewed. Pertinent points mentioned under HPI.        OBJECTIVE:    Vital signs:  /58 (BP Location: Left arm, Patient Position: Left side, Cuff Size: Adult Regular)   Pulse 92   SpO2 91%   Wt Readings from Last 1 Encounters:   12/08/23 155 lb (70.3 kg)     There is no height or weight on file to calculate BMI.    Physical exam:  General appearance: Pleasant male in no apparent distress, sitting up in a wheelchair, minimally conversant.    Extremities: Bilateral lower extremities with improved swelling however continues with pitting swelling more seen on the left lower extremity.  Dressing applied to left lower extremity foot that was not removed. PT and DP unable to palpate 0/2 bilaterally.       DIAGNOSTIC STUDIES:   Labs and diagnostics reviewed including outside records. Pertinent points are mentioned under HPI and assessment and plan sections.        ASSESSMENT AND  PLAN:    Bilateral lower extremity edema secondary to venous stasis and immobility  Asymptomatic left common iliac artery aneurysm 3.1 cm  Left second toe osteomyelitis on antibiotics  History of left lower extremity DVT: Provoked?  Currently on warfarin  Atrial fibrillation/flutter status post previous a flutter ablation on warfarin  Status post PPM  CAD status post 1 vessel CABG in 1994 and JEREMY-LCx 2  Remote smoking history quit approximately 55 years ago                                                                                                         Recommendations:  Discussed today to continue lower extremity compression therapy however would not do stockings while having active wounds and would rather do short stretch wraps.  This was demonstrated to the patient's significant other and caregiver today by RN.  Remove wraps before going to bed at night.  Discussed to keep lower extremities elevated to help with the swelling.   With regards to his SHAAN aneurysm, we will obtain ultrasound aortoiliac in 6 months expected June 2024 with follow-up visit.  Continue current medical management,  no antiplatelets while on warfarin, atorvastatin 40 mg (LDL less than 70).  Blood pressure under good control.  Encouraged to follow-up with podiatry and infectious disease with regards to his osteomyelitis.    Pleasure meeting again with Ms. Florence, his significant other and his caregiver.    Rajiv Pollock MD  Vascular Medicine  January 5, 2024

## 2024-01-08 NOTE — PROGRESS NOTES
Type of Form Received:     Form Received (Date) 1/8/24   Form Filled out Yes, faxed 1/30   Placed in provider folder Yes

## 2024-01-08 NOTE — TELEPHONE ENCOUNTER
Cathie calling from [facility] to reschedule patient's INR to 1/9/24.  Patient was supposed to be seen on 1/2/24 but they did not see the patient.    Called and spoke to patient's SO, Nicolette and advised of the new recheck date.  Nicolette also confirms the patient has been taking 5 mg warfarin on Monday and 2.5 mg all the other days since the last INR check on 12/16/23.    LUANN TillmanN, RN  Anticoagulation Clinic

## 2024-01-09 NOTE — PROGRESS NOTES
ANTICOAGULATION MANAGEMENT     Noe Florence 88 year old male is on warfarin with supratherapeutic INR result. (Goal INR 1.5-2.5)    Recent labs: (last 7 days)     01/09/24  0905   INR 4.0*       ASSESSMENT     Source(s): Chart Review and Patient/Caregiver Call     Warfarin doses taken: Warfarin taken as instructed    Per 1/8/24 TE: Nicolette also confirms the patient has been taking 5 mg warfarin on Monday and 2.5 mg all the other days since the last INR check on 12/16/23.    ++ Per Nicolette 1/9/24-Bill was taking 5 mg Mondays AND Fridays; 2.5 mg all other days as previously recommended by [facility]++    Diet: Decreased greens/vitamin K in diet; ongoing change-patient having difficulty with swallowing lately per Nicolette- met with speech therapy today. Gradual slow progress back to previous diet if able.  Medication/supplement changes:  Doxycycline started 12/14/23 for skin and soft tissue infection, toe osteomyelitis.   New illness, injury, or hospitalization: Yes: difficulty swallowing solids  Signs or symptoms of bleeding or clotting: No  Previous result: Supratherapeutic  Additional findings:  [facility] consult: agree with the full hold today,  agree with moving to 5 mg Q Monday; 2.5 mg ROW        PLAN     Recommended plan for ongoing change(s) affecting INR     Dosing Instructions: hold dose then decrease your warfarin dose (11.1% change) with next INR in 10 days       Summary  As of 1/9/2024      Full warfarin instructions:  1/9: Hold; Otherwise 5 mg every Mon; 2.5 mg all other days   Next INR check:  1/19/2024               Telephone call with Nicolette BANERJEE who agrees to plan and repeated back plan correctly    Orders given to [facility] ([phone])    Education provided:   Goal range and lab monitoring: goal range and significance of current result and Importance of following up at instructed interval  Symptom monitoring: monitoring for bleeding signs and symptoms and when to seek medical attention/emergency  care  Contact [phone] with any changes, questions or concerns.     Plan made with [facility] Pharmacist Maddy LOONEY RN  Anticoagulation Clinic  1/9/2024    _______________________________________________________________________     Anticoagulation Episode Summary       Current INR goal:  Other - see comment   TTR:  8.0% (11.6 mo)   Target end date:  Indefinite   Send INR reminders to:  [facility]    Indications    Atrial fibrillation (H) [I48.91] [I48.91]  Long-term (current) use of anticoagulants [Z79.01] [Z79.01]  Deep vein thrombosis (DVT) (H) [I82.409]  Atrial fibrillation  unspecified type (H) [I48.91]  Chronic atrial fibrillation (H) [I48.20]             Comments:  Goal range: 1.5-2.5             Anticoagulation Care Providers       Provider Role Specialty Phone number    Roberto Sarmiento MD Referring Internal Medicine [phone]

## 2024-01-09 NOTE — PROGRESS NOTES
Type of Form Received:     Form Received (Date) 1/9/24   Form Filled out Yes, faxed 1/30   Placed in provider folder Yes

## 2024-01-16 NOTE — PROGRESS NOTES
Type of Form Received:     Form Received (Date) 1/12/24   Form Filled out Yes, faxed 1/30   Placed in provider folder Yes

## 2024-01-17 NOTE — PROGRESS NOTES
[facility] representative called requesting new standing INR orders for patient - writer faxed as requested. Isabell Miller, LUANNN, RN

## 2024-01-18 NOTE — PROGRESS NOTES
ANTICOAGULATION MANAGEMENT     Noe Florence 88 year old male is on warfarin with therapeutic INR result. (Goal INR  1.5-2.5 )    Recent labs: (last 7 days)     01/18/24  0953   INR 1.5*       ASSESSMENT     Source(s): Chart Review and Patient/Caregiver Call     Warfarin doses taken: Warfarin taken as instructed-hold with 11.1% maintenance dose decrease last encounter  Diet: No new diet changes identified -no changes since last encounter  Medication/supplement changes: None noted  New illness, injury, or hospitalization: No  Signs or symptoms of bleeding or clotting: No  Previous result: Supratherapeutic  Additional findings: None       PLAN     Recommended plan for temporary change(s) and ongoing change(s) affecting INR     Dosing Instructions: Continue your current warfarin dose with next INR in 2 weeks    Summary  As of 1/18/2024      Full warfarin instructions:  5 mg every Mon; 2.5 mg all other days   Next INR check:  2/1/2024               Telephone call with friend, Rica who agrees to plan and repeated back plan correctly    Orders given to [facility] ([phone])    Education provided:   Goal range and lab monitoring: goal range and significance of current result and Importance of following up at instructed interval  Dietary considerations: importance of notifying [facility] to changes in diet  Contact [phone] with any changes, questions or concerns.     Plan made per [facility] anticoagulation protocol    BEVERLEY LOONEY RN  Anticoagulation Clinic  1/18/2024    _______________________________________________________________________     Anticoagulation Episode Summary       Current INR goal:  Other - see comment   TTR:  8.6% (11.6 mo)   Target end date:  Indefinite   Send INR reminders to:  [facility]    Indications    Atrial fibrillation (H) [I48.91] [I48.91]  Long-term (current) use of anticoagulants [Z79.01] [Z79.01]  Deep vein thrombosis (DVT) (H) [I82.409]  Atrial  fibrillation  unspecified type (H) [I48.91]  Chronic atrial fibrillation (H) [I48.20]             Comments:  Goal range: 1.5-2.5             Anticoagulation Care Providers       Provider Role Specialty Phone number    Roberto Sarmiento MD Referring Internal Medicine [phone]

## 2024-01-19 NOTE — TELEPHONE ENCOUNTER
[facility] Call Center    Phone Message    May a detailed message be left on voicemail: yes     Reason for Call: Medication Question or concern regarding medication   Prescription Clarification  Name of Medication: doxycycline hyclate (VIBRAMYCIN) 100 MG capsule     Prescribing Provider: Dawson Wayne MD (HOSPITALIST)     Pharmacy: Please send to this pharmacy if being sent before 5pm:   [facility] [place], [place] - [address]      Please send to this pharmacy if being sent after 5pm or over the weekend:  [facility] [place], [place] - [address]     What on the order needs clarification? Pt was seen in the hospital and placed on antibiotics and Pt is now out of the doxycycline and was wondering if Dr. Vargas would send in more refills to until he can see Dr. Vargas for the hospital follow up on 1/31/24?    Please contact Nicolette back to let her know what can be done?      Action Taken: Message routed to:  [facility] Adult ID

## 2024-01-19 NOTE — TELEPHONE ENCOUNTER
I will prescribe a 2 week course of doxycycline to get Bill through to his 1/31 appt. Since it is before 5pm, I will send to [facility].    Candis Vargas MD   of Medicine, Division of Infectious Diseases  Contact me on the [employer] allie or console  [facility] [phone]

## 2024-01-19 NOTE — TELEPHONE ENCOUNTER
Called Nicolette to relay that Dr. Vargas prescribed a 2 week course of Doxycycline for Bill and sent it to her requested pharmacy. Nicolette was very appreciative on the phone. No questions.

## 2024-01-19 NOTE — TELEPHONE ENCOUNTER
doxycycline hyclate (VIBRAMYCIN) 100 MG capsule 60 capsule 0 12/21/2023  Last Office Visit : 12/26/2023  [facility]     Future Office visit:  3/26/2024     Routing refill request to provider for review/approval because:  Drug not on the [facility], [facility] or [facility] refill protocol. He was discharged with this medication from [facility] on 12/21/2023. Patient's Caregiver stated that he is seeing a specialist regarding this but the appointment isn't for a couple more weeks. Please send a new order if appropriate.

## 2024-01-19 NOTE — TELEPHONE ENCOUNTER
Reviewed the discharge note on 12/21/23.      Currently on Augmentin 500/125 p.o. 3 times daily and doxycycline 100 mg twice daily, started on 12/13 w/ plan to treat pt for 6-8 weeks; end date at least 1/20/2023.  (Already has an appointment with infectious disease on 1/31/2023)  Also per ID, If future Cr get to be 1.7 or higher, would reduce amox/clav to 500/125 BID           Dr. Sarmiento,    Pt is requesting doxycycline refill, but I think he needs augmentin refill, too.   Cr on 12/26/23 was 1.85. so I pended augmentin with BID.  Please review and approve the refills if appropriate. Thank you.    Akira-Mi

## 2024-01-19 NOTE — TELEPHONE ENCOUNTER
Patient called the pharmacy to refill Doxycycline.  He was discharged with this medication from [facility] on 12/21/2023.  Patient's Caregiver stated that he is seeing a specialist regarding this but the appointment isn't for a couple more weeks.  Please send a new order if appropriate.    Thanks!  Karla Reddy [facility]

## 2024-01-25 NOTE — PROGRESS NOTES
Type of Form Received:     Form Received (Date) 1/25/24   Form Filled out Yes, faxed 1/30   Placed in provider folder Yes

## 2024-01-31 NOTE — PROGRESS NOTES
Assessment & Plan     Osteomyelitis of L 2nd toe; prior culture from 9/2023 revealed many MRSA that was also bactrim-R, tetracycline and clindamycin sensitive. He was hospitalized on 12/8, responded to vanco + pip/tazo and transitioned to amox/clav and doxycycline on his discharge. He has fortunately done well having completed about 7.5 weeks of antibiotics. He does have some robust granulation tissue. This is not suggestive of ongoing infection (in fact it suggests response to therapy) but could pose a problem for future friction injury.  BL LE edema due to venous stasis and immobility  Asymptomatic L common iliac artery aneurysm (3.1cm)  Hx L LE DVT, on warfarin, but presumably for DVT  CKD  Atrial fibrillation, on warfarin  CAD s/p CABP  MGUS  No antibiotic allergies  Covid-19 PCR positive in 12/2023, not hypoxic      Recommendations:  - can stop oral antibiotics  - if new erythema at the MTP, swelling, or fluctuance appear, resume doxycycline and amox/clav  - wound care referral placed - his current regimen appears to have been very effective in promoting healing, but the granulation tissue may pose a risk for future friction injury  - no current infectious contraindication to preforming weight-bearing activities (though he is quite deconditioned so is likely to require substantial assistance)  - agree with short stretch wraps to optimize circulation which will promote wound healing and reduce infection risk  - can followup in ID clinic with me as needed.    It is a pleasure to participate in Sha's care. Visit length 40 min, with >50% clinical counseling on the day of visit.    Candis Vargas MD   of Medicine, Division of Infectious Diseases  Contact me on the [employer] allie or console  [facility] [phone]  - Adult Infectious Disease  Referral  - Wound Care Referral; Future      No follow-ups on file.    Subjective   Sha is a 88 year old, presenting for the following health  issues:  RECHECK    HPI     Sha has done pretty well since his discharge in mid-Dec. He saw vascular surgery yesterday. He was encouraged to continue short stretch wraps. His L 2nd toe is much better - no more fluctuance. There is actually fairly robust granulation tissue.    Sha has chronic pain in his BL LE, this morning was difficulty in rushing to arrive on time as sudden joint movements exacerbate his pain.    History collected from Nicolette and Bandar, Sha's spouse and caregiver, respectively.          Objective    /75 (BP Location: Left arm, Patient Position: Sitting, Cuff Size: Adult Large)   Pulse 63   Temp 97.4  F (36.3  C) (Oral)   SpO2 98%   There is no height or weight on file to calculate BMI.  Physical Exam   GENERAL: alert and no distress  NECK: no adenopathy, no asymmetry, masses, or scars  RESP: lungs clear to auscultation - no rales, rhonchi or wheezes  CV: regular rate and rhythm, normal S1 S2, no S3 or S4, no murmur, click or rub, no peripheral edema  ABDOMEN: soft, nontender, no hepatosplenomegaly, no masses and bowel sounds normal  MS: no gross musculoskeletal defects noted, no edema  SKIN: robust granulation tissue on L 2nd toe, see image in media tab         Signed Electronically by: Candis Vargas MD

## 2024-01-31 NOTE — PROGRESS NOTES
ANTICOAGULATION MANAGEMENT     Noe Florence 88 year old male is on warfarin with therapeutic INR result. (Goal INR Other 1.5 - 2.5)    Recent labs: (last 7 days)     01/31/24  0000   INR 1.8*       ASSESSMENT     Source(s): Chart Review and Patient/Caregiver Call     Warfarin doses taken: Warfarin taken as instructed  Diet: No new diet changes identified  Medication/supplement changes:  continues on doxycycline  New illness, injury, or hospitalization: No  Signs or symptoms of bleeding or clotting: No  Previous result: Therapeutic last visit; previously outside of goal range  Additional findings:  Sha is seeing provider today; they will call the [facility] with any changes in medications.       PLAN     Recommended plan for no diet, medication or health factor changes affecting INR     Dosing Instructions: Continue your current warfarin dose with next INR in 2 weeks       Summary  As of 1/31/2024      Full warfarin instructions:  5 mg every Mon; 2.5 mg all other days   Next INR check:  2/14/2024               Telephone call with Rica who agrees to plan and repeated back plan correctly    Orders given to [facility] ([phone])(Left message with next INR date)    Education provided:   Contact [phone] with any changes, questions or concerns.     Plan made per [facility] anticoagulation protocol    Danya Edwards RN  Anticoagulation Clinic  1/31/2024    _______________________________________________________________________     Anticoagulation Episode Summary       Current INR goal:  Other - see comment   TTR:  5.7% (11.5 mo)   Target end date:  Indefinite   Send INR reminders to:  [facility]    Indications    Atrial fibrillation (H) [I48.91] [I48.91]  Long-term (current) use of anticoagulants [Z79.01] [Z79.01]  Deep vein thrombosis (DVT) (H) [I82.409]  Atrial fibrillation  unspecified type (H) [I48.91]  Chronic atrial fibrillation (H) [I48.20]             Comments:  Goal range: 1.5-2.5              Anticoagulation Care Providers       Provider Role Specialty Phone number    Roberto Sarmiento MD Referring Internal Medicine [phone]

## 2024-01-31 NOTE — NURSING NOTE
Chief Complaint   Patient presents with    RECHECK   BP (!) 152/79 (BP Location: Left arm, Patient Position: Sitting, Cuff Size: Adult Large)   Pulse 63   Temp 97.4  F (36.3  C) (Oral)   SpO2 98% Yulia Barber on 1/31/2024 at 12:42 PM

## 2024-02-01 NOTE — PROGRESS NOTES
Type of Form Received:     Form Received (Date) 2/1/24   Form Filled out Yes, faxed 2/6   Placed in provider folder Yes

## 2024-02-02 NOTE — MR AVS SNAPSHOT
After Visit Summary   12/4/2017    Noe Florence    MRN: [id number]           Patient Information     Date Of Birth          1935        Visit Information        Provider Department      12/4/2017 11:40 AM Deedee Baird MD [facility]        Today's Diagnoses     S/P ablation of atrial flutter    -  1    Paroxysmal atrial fibrillation (H)        Automatic implantable cardioverter-defibrillator - Medtronic dual chamber ICD - Not Dependent          Care Instructions    Cardiology Provider you saw in clinic today: Dr. Baird    Follow-up Visit:  1 year with Dr. Baird and device check prior        Please contact us via VendRx for questions or concerns.    Lashawn Cool RN   Electrophysiology Nurse Coordinator.  [phone]    If your question concerns the schedule/appointment times, contact:  [facility]   [phone]    Device Clinic (Pacemakers, ICD, Loop Recorders)   [phone]      You will receive all normal lab and testing results via VendRx or mail if not reviewed in clinic today.   If you need a medication refill please contact your pharmacy.    As always, thank you for trusting us with your health care needs!            Follow-ups after your visit        Follow-up notes from your care team     Return in about 1 year (around 12/4/2018) for Dr. Baird.      Your next 10 appointments already scheduled     Dec 04, 2017 12:30 PM CST   LAB with  LAB   [facility] ([facility])    [address] [place] [id number] [phone]           Please do not eat 10-12 hours before your appointment if you are coming in fasting for labs on lipids, cholesterol, or glucose (sugar). This does not apply to pregnant women. Water, hot tea and black coffee (with nothing added) are okay. Do not drink other fluids, diet soda or chew gum.            Dec 05, 2017  4:00 PM CST   (Arrive by 3:45 PM)   New Patient Visit with  Frida Desai MD   [facility] ([facility])    [address] [place] [address]  [address] [facility] [address]   [phone]            Dec 21, 2017  8:30 AM CST   (Arrive by 8:15 AM)   Return Visit with Tiffany Kenyon MD   [facility] ([facility])    [address] [place] [address]  [address] [facility] [address]   [phone]            Quentin 15, 2018  9:40 AM CST   (Arrive by 9:25 AM)   PHYSICAL with Roberto Sarmiento MD   [facility] ([facility])    [address] [place] [address]  [address] [facility] [address]   [phone]            Feb 12, 2018  3:30 PM CST   (Arrive by 3:15 PM)   Return Visit with Lashawn Jackson MD   [facility] ([facility])    [address] [place] [address]   [phone]            Mar 30, 2018 11:15 AM CDT   LAB with  LAB   [facility] ([facility])    [address] [place] [address]   [phone]           Please do not eat 10-12 hours before your appointment if you are coming in fasting for labs on lipids, cholesterol, or glucose (sugar). This does not apply to pregnant women. Water, hot tea and black coffee (with nothing added) are okay. Do not drink other fluids, diet soda or chew gum.            Mar 30, 2018 11:40 AM CDT   (Arrive by 11:25 AM)   CT CHEST/ABDOMEN/PELVIS W CONTRAST with UCCT2   [facility] ([facility])    [address] [place] [address]   [phone]           Please bring any scans or X-rays taken at other hospitals, if similar tests were done. Also bring a list of your medicines, including vitamins, minerals and over-the-counter drugs. It is safest to leave personal items at home.  Be sure to tell your doctor:   If you have any allergies.   If there s any  Female chance you are pregnant.   If you are breastfeeding.   If you have any special needs.  You may have contrast for this exam. To prepare:   Do not eat or drink for 2 hours before your exam. If you need to take medicine, you may take it with small sips of water. (We may ask you to take liquid medicine as well.)   The day before your exam, drink extra fluids at least six 8-ounce glasses (unless your doctor tells you to restrict your fluids).  Patients over 70 or patients with diabetes or kidney problems:   If you haven t had a blood test (creatinine test) within the last 30 days, go to your clinic or Diagnostic Imaging Department for this test.  If you have diabetes:   If your kidney function is normal, continue taking your metformin (Avandamet, Glucophage, Glucovance, Metaglip) on the day of your exam.   If your kidney function is abnormal, wait 48 hours before restarting this medicine.  You will have oral contrast for this exam:   You will drink the contrast at home. Get this from your clinic or Diagnostic Imaging Department. Please follow the directions given.  Please wear loose clothing, such as a sweat suit or jogging clothes. Avoid snaps, zippers and other metal. We may ask you to undress and put on a hospital gown.  If you have any questions, please call the Imaging Department where you will have your exam.            Apr 02, 2018 10:45 AM CDT   (Arrive by 10:30 AM)   Return Visit with Francisco Gilliam MD   [facility] ([facility])    [address] [place] [address]  [address] [facility] [address] [phone]            Jun 04, 2018  9:00 AM CDT   (Arrive by 8:45 AM)   Implanted Defibulator with Uc Cv Device 1   [facility] ([facility])    [address] [place] [address]  [address] [facility] [id number] [phone]              Who to contact     If you have questions or need follow up information about today's clinic visit or your  "schedule please contact [facility] directly at [phone].  Normal or non-critical lab and imaging results will be communicated to you by MyChart, letter or phone within 4 business days after the clinic has received the results. If you do not hear from us within 7 days, please contact the clinic through MyChart or phone. If you have a critical or abnormal lab result, we will notify you by phone as soon as possible.  Submit refill requests through [employer] or call your pharmacy and they will forward the refill request to us. Please allow 3 business days for your refill to be completed.          Additional Information About Your Visit        [employer] Information     [employer] gives you secure access to your electronic health record. If you see a primary care provider, you can also send messages to your care team and make appointments. If you have questions, please call your primary care clinic.  If you do not have a primary care provider, please call [phone] and they will assist you.        Care EveryWhere ID     This is your Care EveryWhere ID. This could be used by other organizations to access your [place] medical records  [id number]        Your Vitals Were     Pulse Height Pulse Oximetry BMI (Body Mass Index)          70 1.702 m (5' 7\") 97% 23.67 kg/m2         Blood Pressure from Last 3 Encounters:   12/04/17 117/58   11/13/17 122/67   11/05/17 110/57    Weight from Last 3 Encounters:   12/04/17 68.5 kg (151 lb 1.6 oz)   11/13/17 69.3 kg (152 lb 11.2 oz)   11/04/17 68.5 kg (151 lb 2 oz)              Today, you had the following     No orders found for display       Primary Care Provider Office Phone # Fax #    Roberto Sarmiento [phone]       [address] [place] [address]        Equal Access to Services     LEE CASTILLO : Delilah Bains, waaxda luqadaha, qaybta kaalmada aicha, sil minor. So wac " [phone].    ATENCIÓN: Si perico carl, tiene a aguirre disposición servicios gratuitos de asistencia lingüística. Lauren galicia [phone].    We comply with applicable federal civil rights laws and [place] laws. We do not discriminate on the basis of race, color, national origin, age, disability, sex, sexual orientation, or gender identity.            Thank you!     Thank you for choosing [facility]  for your care. Our goal is always to provide you with excellent care. Hearing back from our patients is one way we can continue to improve our services. Please take a few minutes to complete the written survey that you may receive in the mail after your visit with us. Thank you!             Your Updated Medication List - Protect others around you: Learn how to safely use, store and throw away your medicines at www.disposemymeds.org.          This list is accurate as of: 12/4/17 12:10 PM.  Always use your most recent med list.                   Brand Name Dispense Instructions for use Diagnosis    aspirin 81 MG tablet      Take 1 tablet by mouth daily.        atorvastatin 40 MG tablet    LIPITOR    90 tablet    Take 1 tablet (40 mg) by mouth daily    Hyperlipidemia, unspecified hyperlipidemia type       bacitracin ointment     28 g    Apply topically 2 times daily APPLY TO LEFT LEG TWO TIMES PER DAY    Left leg cellulitis       Blood Pressure Monitor Kit     1 kit    1 Units daily    Hypertension       CENTRUM SILVER per tablet      Take 1 tablet by mouth daily. AM        ciclopirox 0.77 % cream    LOPROX    90 g    Apply topically 2 times daily To feet and toenails.    Dermatophytosis of nail, Tinea pedis of both feet       cyanocobalamin 1000 MCG tablet    vitamin  B-12    180 tablet    Take 2 tablets (2,000 mcg) by mouth daily    Anemia       doxazosin 2 MG tablet    CARDURA    90 tablet    Take 1/2 tab in the morning and 1/2 tab in the evening    Essential hypertension       fluticasone 50 MCG/ACT spray     FLONASE    3 Package    Spray 2 sprays into both nostrils daily    Unspecified sinusitis (chronic)       ketoconazole 2 % cream    NIZORAL    60 g    Apply topically daily On hold    Tinea corporis       loratadine 10 MG tablet    CLARITIN     Take 10 mg by mouth as needed        metoprolol 25 MG tablet    LOPRESSOR    180 tablet    Take 1 tablet (25 mg) by mouth 2 times daily    Coronary artery disease involving native heart without angina pectoris, unspecified vessel or lesion type       mirtazapine 15 MG tablet    REMERON     Take 15 mg by mouth At Bedtime.        order for DME     1 Units    Compression socks - knee 20-30 mm Hg Has open left leg wound    PVD (peripheral vascular disease) (H), Ulcer of left lower extremity, limited to breakdown of skin (H)       triamcinolone 0.1 % cream    KENALOG    80 g    Apply topically 2 times daily For up to two weeks in a row    Atopic eczema       warfarin 5 MG tablet    COUMADIN    35 tablet    7.5 mg on Wed; 5 mg all other days  or as instructed by coumadin clinic.    Atrial flutter (H)       ZOLOFT 100 MG tablet   Generic drug:  sertraline      Take 100 mg by mouth every evening.

## 2024-02-06 NOTE — TELEPHONE ENCOUNTER
Left Voicemail (1st Attempt) for the patient to call back and schedule the following:    Appointment type: New Wound Care Provider   Provider: Leon Wilson MD  Return date: Next available   Specialty phone number: [phone]  Additional appointment(s) needed: no   Additonal Notes: Pt being referred by Candis Vargas MD for:   Toe osteomyelitis

## 2024-02-09 NOTE — TELEPHONE ENCOUNTER
Incoming call from Rica who reports a mistake with medication set up and Bill missed 4 days of warfarin. Advised booster dose x2 with a recheck as previously scheduled with [facility] 2/14/24. Tracking calendar updated.     BEVERLEY LOONEY RN  Anticoagulation Clinic  [phone]

## 2024-02-14 NOTE — PROGRESS NOTES
ANTICOAGULATION MANAGEMENT     Noe Florence 88 year old male is on warfarin with therapeutic INR result. (Goal INR Other - see comment)    Recent labs: (last 7 days)     02/14/24  0000   INR 1.7*       ASSESSMENT     Source(s): Chart Review and Patient/Caregiver Call     Warfarin doses taken: Less warfarin taken than planned which may be affecting INR  Diet: Per Rica, Sha is not able to swallow--has not had anything to eat or drink x two days  Medication/supplement changes:  has not taken last two days  New illness, injury, or hospitalization: Yes: Rica reports Sha has declined the last two days. She is trying to set up an appointment with PCP today.  She thinks he may need hospice  Signs or symptoms of bleeding or clotting: No  Previous result: Therapeutic last 2(+) visits  Additional findings:  Chart routed to Dr. Matt and Dr. Sarmiento regarding if warfarin should continue       PLAN     Recommended plan for temporary change(s) affecting INR     Dosing Instructions: Continue your current warfarin dose with next INR in 2 weeks       Summary  As of 2/14/2024      Full warfarin instructions:  5 mg every Mon; 2.5 mg all other days   Next INR check:  2/28/2024               Telephone call with Rica who verbalizes understanding and agrees to plan    Orders given to [facility] ([phone])    Education provided:   Contact [phone] with any changes, questions or concerns.     Plan made per [facility] anticoagulation protocol    Danya Edwards RN  Anticoagulation Clinic  2/14/2024    _______________________________________________________________________     Anticoagulation Episode Summary       Current INR goal:  Other - see comment   TTR:  2.2% (11.5 mo)   Target end date:  Indefinite   Send INR reminders to:  [facility]    Indications    Atrial fibrillation (H) [I48.91] [I48.91]  Long-term (current) use of anticoagulants [Z79.01] [Z79.01]  Deep vein thrombosis (DVT) (H)  [I82.409]  Atrial fibrillation  unspecified type (H) [I48.91]  Chronic atrial fibrillation (H) [I48.20]             Comments:  Goal range: 1.5-2.5             Anticoagulation Care Providers       Provider Role Specialty Phone number    Roberto Sarmiento MD [facility] Internal Medicine [phone]

## 2024-02-15 PROBLEM — E87.0 HYPERNATREMIA: Status: ACTIVE | Noted: 2024-01-01

## 2024-02-15 PROBLEM — N17.9 ACUTE KIDNEY INJURY (H): Status: ACTIVE | Noted: 2024-01-01

## 2024-02-15 PROBLEM — R62.7 FAILURE TO THRIVE IN ADULT: Status: ACTIVE | Noted: 2024-01-01

## 2024-02-15 NOTE — ED TRIAGE NOTES
Pt arrived via ems. Pt found to be unresponsive this am after 3 days of ams. Pt found to have sats of 81% by fire and on 10 l mask for transport.     Triage Assessment (Adult)       Row Name 02/15/24 1240          Triage Assessment    Airway WDL WDL        Respiratory WDL    Respiratory WDL X        Skin Circulation/Temperature WDL    Skin Circulation/Temperature WDL WDL        Cardiac WDL    Cardiac WDL X     Cardiac Rhythm ST        Peripheral/Neurovascular WDL    Peripheral Neurovascular WDL WDL        Cognitive/Neuro/Behavioral WDL    Cognitive/Neuro/Behavioral WDL X;level of consciousness     Level of Consciousness unresponsive

## 2024-02-15 NOTE — PHARMACY-VANCOMYCIN DOSING SERVICE
"Pharmacy Vancomycin Initial Note  Date of Service February 15, 2024  Patient's  1935  88 year old, male    Indication: Skin and Soft Tissue Infection --> changed to sepsis    Current estimated CrCl = Estimated Creatinine Clearance: 18.6 mL/min (A) (based on SCr of 2.73 mg/dL (H)).    Creatinine for last 3 days  2/15/2024: 12:50 PM Creatinine 2.73 mg/dL    Recent Vancomycin Level(s) for last 3 days  No results found for requested labs within last 3 days.      Vancomycin IV Administrations (past 72 hours)        No vancomycin orders with administrations in past 72 hours.                    Nephrotoxins and other renal medications (From now, onward)      Start     Dose/Rate Route Frequency Ordered Stop    02/15/24 1545  vancomycin (VANCOCIN) 1,250 mg in 0.9% NaCl 250 mL intermittent infusion         1,250 mg  over 90 Minutes Intravenous ONCE 02/15/24 1543      02/15/24 1543  vancomycin place vega - receiving intermittent dosing         1 each Intravenous SEE ADMIN INSTRUCTIONS 02/15/24 1543      02/15/24 1445  piperacillin-tazobactam (ZOSYN) 3.375 g vial to attach to  mL bag        Note to Pharmacy: For [facility], [facility] and [facility]: For Zosyn-naive patients, use the \"Zosyn initial dose + extended infusion\" order panel.    3.375 g  over 30 Minutes Intravenous ONCE 02/15/24 1443              Contrast Orders - past 72 hours (72h ago, onward)      Start     Dose/Rate Route Frequency Stop    02/15/24 1515  perflutren diluted 1mL to 2mL with saline (OPTISON) diluted injection 6 mL         6 mL Intravenous ONCE 02/15/24 1511             Plan:  Start vancomycin 1250 mg IV x1, followed by intermittent dosing based on levels given ARSALAN.  Vancomycin monitoring method: Trough (Method 2 = manual dose calculation)  Vancomycin therapeutic monitoring goal: 15-20 mg/L  Pharmacy will check vancomycin levels as appropriate in 1-3 Days.    Serum creatinine levels will be ordered daily for the first week of therapy and at least twice " weekly for subsequent weeks.      Tanesha Jasso, RP

## 2024-02-15 NOTE — PATIENT INSTRUCTIONS
Thank you for visiting the Primary Care Center today at the [facility]! The following is some information about our clinic:     Primary Care Center Frequently-Asked Questions    (1) How do I schedule appointments at the [facility]?     Primary Care--to schedule or make changes to an existing appointment, please call our primary care line at [phone].    Labs--to schedule a lab appointment at the [facility] you can use [employer] or call [phone]. If you have a [place] location that is closer to home, you can reach out to that location for scheduling options.     Imaging--if you need to schedule a CT, X-ray, MRI, ultrasound, or other imaging study you can call [phone] to schedule at the [facility] or any other [facility] imaging location.     Referrals--if a referral to another specialty was ordered you can expect a phone call from their scheduling team. If you have not heard from them in a week, please call us or send us a Coub message to check the status or get a scheduling number. Please note that this only applies to internal [facility] referrals. If the referral is external you would need to contact their office for scheduling.     (2) I have a question about my visit, who do I contact?     You can call us at the primary care line at [phone] to ask questions about your visit. You can also send a secure message through Coub, which is reviewed by clinic staff. Please note that [employer] messages have a twenty-four to forty-eight business hour turnaround time and should not be used for urgent concerns.    (3) How will I get the results of my tests?    If you are signed up for [employer] all tests will be released to you within twenty-four hours of resulting. Please allow three to five days for your doctor to review your results and place a note interpreting the results. If you do not have [employer] you will receive your  results through mail seven to ten business days following the return of the tests. Please note that if there should be any urgent or concerning results that your doctor or their registered nurse will reach out to you the same day as the tests come back. If you have follow up questions about your results or would like to discuss the results in detail please schedule a follow up with your provider either in person or virtually.     (4) How do I get refills of my prescriptions?     You should always first contact your pharmacy for refills of your medications. If submitting a refill request on [employer], please be sure to submit the request only once--repeat requests can cause delays in refill. If you are requesting a NEW medication or a medication related to new symptoms you will need to schedule an appointment with a provider prior to approval. Please note: Routine medication refills have up to one to three business day turnaround whereas controlled substances refills have up to five to seven business day turnaround.    (5) I have new symptoms, what do I do?     If you are having an immediate medical emergency, you should dial 911 for assistance.   For anything urgent that needs to be seen within a few hours to one day you should visit a local urgent care for assistance.  For non-urgent symptoms that need to be seen within a few days to a week you can schedule with an available provider in primary care by going to e[employer] or calling [phone].   If you are not sure how serious your symptoms are or you would like to receive medical advice you can always call [phone] to speak with a triage nurse.

## 2024-02-15 NOTE — ED PROVIDER NOTES
ED Provider Note  [facility]      History     Chief Complaint   Patient presents with    Altered Mental Status     The history is provided by the patient and medical records.     Noe Florence is an 88 year old male who has a history of coronary artery disease, polymyalgia rheumatica, atrial fibrillation anticoagulated on Coumadin, and a chronic wound to his left foot with history of osteomyelitis in this area.  He presents for generalized weakness.  He has been off antibiotics for his osteomyelitis for a couple of weeks. Doing home wound care and following with wound care/podiatry.  In the last 3 days, family has noted that he seems weaker and less responsive.  He has been tired, sleeping a lot, and is finding it harder to swallow safely. Has struggled to swallow in the past. Also has very little appetite. He is no longer able to get out of bed independently. When paramedics arrived to the house they found him hypoxic with a room air saturation of 81%, blood sugar 124, heart rate 115 with blood pressure in the 90s.  No reports of vomiting or diarrhea and no known fevers. Is getting home nursing assistance and someone mentioned to the family that they may want to consider hospice for him, but he is currently Full Code.      This part of the medical record was transcribed by Kacey Chaudhry Medical Scribe, from a dictation done by Nini Serna MD.     Past Medical History  Past Medical History:   Diagnosis Date    Advanced open-angle glaucoma     Atrial fibrillation (H)     CKD (chronic kidney disease), stage III (H) 2005    Colon cancer (H)     Stage II-B colon cancer    Coronary artery disease     s/p CABG x 2, JEREMY x 2    Diverticulitis     Hyperlipidemia     Hypertension     Ischemic cardiomyopathy     MGUS (monoclonal gammopathy of unknown significance)     Nonsenile cataract     BE    Osteoporosis     left hip fracture    Polymorphic ventricular tachycardia (H)     Polymyalgia  rheumatica (H24)     PVD (posterior vitreous detachment), both eyes 2005    S/P ablation of atrial flutter 6/20/14    CTI    Stented coronary artery     SVT (supraventricular tachycardia)     PPM/AICD for NSVT    Upper leg DVT (deep venous thromboembolism), chronic (H)     Left    Weight loss, unintentional 2017    15 lb in 4 months     Past Surgical History:   Procedure Laterality Date    CATARACT IOL, RT/LT Bilateral     COLONOSCOPY  3/13/2014    Procedure: COMBINED COLONOSCOPY, SINGLE BIOPSY/POLYPECTOMY BY BIOPSY;;  Surgeon: Mary Gerber MD;  Location:  GI    COLONOSCOPY N/A 6/22/2015    Procedure: COLONOSCOPY;  Surgeon: Marilin Newman MD;  Location:  GI    COLONOSCOPY N/A 11/7/2018    Procedure: COMBINED COLONOSCOPY, SINGLE OR MULTIPLE BIOPSY/POLYPECTOMY BY BIOPSY;  Surgeon: Roberto Esteban MD;  Location:  GI    EP ICD GENERATOR REPLACEMENT DUAL N/A 12/11/2018    Procedure: EP ICD Generator Change Dual;  Surgeon: Deedee Baird MD;  Location:  HEART CARDIAC CATH LAB    H ABLATION ATRIAL FLUTTER      HEART CATH DRUG ELUTING STENT PLACEMENT  4/19/2012    JEREMY x 2 to LCx    IMPLANT IMPLANTABLE CARDIOVERTER DEFIBRILLATOR  5-    ppm/aicd    KNEE SURGERY      right and left knee surgeries    LAPAROSCOPIC ASSISTED COLECTOMY Right 2/1/2019    Procedure: Laparoscopic Converted to Open Transverse Colectomy with Lysis of Adhesions;  Surgeon: Chanelle Guzmán MD;  Location:  OR    LAPAROSCOPIC ASSISTED COLECTOMY LEFT (DESCENDING)  4/8/2014    Procedure: LAPAROSCOPIC ASSISTED COLECTOMY LEFT (DESCENDING);  Hand assisted Laparoscopic Sigmoid Colectomy , Laparoscopic mobilization of spleenic flexure *Latex Allergy*Anesthesia General with Block;  Surgeon: Chanelle Guzmán MD;  Location:  OR    OPEN REDUCTION INTERNAL FIXATION RODDING INTRAMEDULLAR FEMUR FRACTURE TABLE Left 3/14/2019    Procedure: Open Reduction Internal Fixation Left Femur Intramedullar Nailing;   Surgeon: Srikanth Avalos MD;  Location: [facility] OR    REPAIR VALVE MITRAL  2006     30-mm Medtronic Julian ring     SELECTIVE LASER TRABECULOPLASTY (SLT) OD (RIGHT EYE)  4/10, 1/12,+1/9/13    RE    SELECTIVE LASER TRABECULOPLASTY (SLT) OS (LEFT EYE)  5/2012    SHOULDER SURGERY      right rotator cuff    ZZC CABG, ARTERY-VEIN, FOUR  2006    LIMA-LAD, SVG-Rt PDA, SVG-OM2, SVG-Diag 1    ZZC CABG, VEIN, SINGLE  1994    1-vessel CABG, SVG->PDRCA      acetaminophen (TYLENOL) 500 MG tablet  alendronate (FOSAMAX) 70 MG tablet  amoxicillin-clavulanate (AUGMENTIN) 500-125 MG tablet  bacitracin 500 UNIT/GM ophthalmic ointment  baclofen (LIORESAL) 10 MG tablet  calcium carbonate 500 mg, elemental, (OSCAL;OYSTER SHELL CALCIUM) 500 MG tablet  cholecalciferol 1000 units TABS  COMPRESSION STOCKINGS  cyanocobalamin (VITAMIN B-12) 1000 MCG tablet  diclofenac (VOLTAREN) 1 % topical gel  doxycycline hyclate (VIBRAMYCIN) 100 MG capsule  Emollient (CERAVE SA RENEWING) LOTN  Emollient (CERAVE) CREA  ferrous sulfate (FE TABS) 325 (65 Fe) MG EC tablet  ketoconazole (NIZORAL) 2 % external cream  ketoconazole (NIZORAL) 2 % external cream  ketoconazole (NIZORAL) 2 % external shampoo  lactobacillus rhamnosus, GG, (CULTURELL) capsule  metoprolol tartrate (LOPRESSOR) 25 MG tablet  mirtazapine (REMERON) 15 MG tablet  Misc. Devices (PILL ) MISC  Multiple Vitamins-Minerals (MULTIVITAMIN ADULT PO)  polyethylene glycol (MIRALAX) 17 GM/Dose powder  risperiDONE (RISPERDAL) 0.5 MG tablet  sennosides (SENOKOT) 8.6 MG tablet  sertraline (ZOLOFT) 100 MG tablet  tamsulosin (FLOMAX) 0.4 MG capsule  urea (GORMEL) 20 % external cream  warfarin ANTICOAGULANT (JANTOVEN ANTICOAGULANT) 5 MG tablet      Allergies   Allergen Reactions    Latex Rash     Rash     Family History  Family History   Problem Relation Age of Onset    C.A.D. Father     Anesthesia Reaction No family hx of     Crohn's Disease No family hx of     Ulcerative Colitis No family hx of      Cancer - colorectal No family hx of     Macular Degeneration No family hx of     Cancer No family hx of         No known family hx of skin cancer    Melanoma No family hx of     Skin Cancer No family hx of      Social History   Social History     Tobacco Use    Smoking status: Former     Types: Cigarettes, Cigars     Quit date: 1968     Years since quittin.1    Smokeless tobacco: Never   Vaping Use    Vaping Use: Never used   Substance Use Topics    Alcohol use: Not Currently    Drug use: No         A medically appropriate review of systems was performed with pertinent positives and negatives noted in the HPI, and all other systems negative.    Physical Exam   BP: 115/74  Pulse: 99  Temp: 98.1  F (36.7  C)  Resp: 22  SpO2: 100 %    Physical Exam  Gen:A&Ox3, no acute distress  HEENT:PERRL, no facial tenderness or wounds, head atraumatic, oropharynx clear, mucous membranes moist, TMs clear bilaterally  Neck:no bony tenderness or step offs, no JVD, trachea midline  Back: no CVA tenderness, no midline bony tenderness  CV:RRR without murmurs  PULM:Clear to auscultation bilaterally  Abd:soft, nontender, nondistended. Bowel sounds present and normal  UE:No traumatic injuries, skin normal  LE:no traumatic injuries, skin normal  Neuro:CN II-XII intact, strength 5/5 throughout  Skin: no rashes or ecchymoses      ED Course, Procedures, & Data      Procedures            EKG Interpretation:      Interpreted by Nini Serna MD  Time reviewed: 1:41pm  Symptoms at time of EKG: generalized weakness   Rhythm: normal sinus   Rate: 90  Axis: left  Ectopy: none  Conduction: RBBB  ST Segments/ T Waves: No ST-T wave changes  Q Waves: none  Comparison to prior: Unchanged from Dec 9, 2023    Clinical Impression: NSR with RBBB          EKG Interpretation:      Interpreted by Nini Serna MD  Time reviewed: 4:18pm  Symptoms at time of EKG: change in rhythm on monitor   Rhythm: atrial fibrillation  Rate: irregular  ~75  Axis: NORMAL  Ectopy: PVC  Conduction: RBBB  ST Segments/ T Waves: No ST-T wave changes  Q Waves: none  Comparison to prior: minimal change from earlier today, rhythm more clear    Clinical Impression: rate controlled atrial fibrillation with RBBB and PVC       Results for orders placed or performed during the hospital encounter of 02/15/24   XR Chest Port 1 View     Status: None    Narrative    XR CHEST PORT 1 VIEW  2/15/2024 1:15 PM     HISTORY:  dyspnea       COMPARISON:  12/11/2023 CXR    TECHNIQUE: Portable, semiupright, frontal projection radiograph of the  chest.    FINDINGS:   Implantable cardiac defibrillator projects over the left upper chest  with stable positioning of right atrial lead and right ventricular  shock coil. Mitral valve prosthesis. Postsurgical changes of the chest  with median sternotomy wires intact.    Cardiac mediastinal silhouette is stable. The trachea is midline. No  appreciable pneumothorax. Streaky opacities of the lower lungs and  perihilar regions. No focal pulmonary consolidations.     Visualized upper abdomen is unremarkable. No acute osseous  abnormalities.      Impression    IMPRESSION:  Unchanged perihilar and bibasilar streaky/patchy opacity. No  appreciable pneumothorax or acute pulmonary consolidations.    I have personally reviewed the examination and initial interpretation  and I agree with the findings.    AFIA DANIELS MD         SYSTEM ID:  [id number]   Head CT w/o contrast     Status: None    Narrative    CT HEAD W/O CONTRAST 2/15/2024 2:11 PM    History: AMS     Comparison: Head CT from 9/11/2016.    Technique: Using multidetector thin collimation helical acquisition  technique, axial, coronal and sagittal CT images from the skull base  to the vertex were obtained without intravenous contrast.   (topogram) image(s) also obtained and reviewed.    Findings:   Images are degraded aided by motion artifact.  There is no intracranial hemorrhage, mass effect, or  midline shift.  Gray/white matter differentiation in both cerebral hemispheres is  preserved. Ventricles are proportionate to the cerebral sulci.  Moderate generalized parenchymal volume loss. White matter hyper  attenuation, most likely represents chronic small vessel ischemic  disease. The right perirolandic chronic insult (series 3 image 16).  The basal cisterns are clear. Arterial consultations.    The bony calvaria and the bones of the skull base are normal. The  visualized portions of the paranasal sinuses are clear. Right mastoid  and middle ear cavity effusion. Bilateral pseudophakia.      Impression    Impression:  1. No acute intracranial pathology.   2. Moderate generalized parenchymal volume loss and leukoaraiosis.   3. Right perirolandic chronic insult.  4. Right mastoid and middle ear cavity effusion.    THOMAS MCHUGH MD         SYSTEM ID:  [id number]   [place] Draw *Canceled*     Status: None ()    Narrative    The following orders were created for panel order [place] Draw.  Procedure                               Abnormality         Status                     ---------                               -----------         ------                       Please view results for these tests on the individual orders.   Comprehensive metabolic panel     Status: Abnormal   Result Value Ref Range    Sodium 164 (HH) 135 - 145 mmol/L    Potassium 4.5 3.4 - 5.3 mmol/L    Carbon Dioxide (CO2) 18 (L) 22 - 29 mmol/L    Anion Gap 16 (H) 7 - 15 mmol/L    Urea Nitrogen 107.0 (H) 8.0 - 23.0 mg/dL    Creatinine 2.73 (H) 0.67 - 1.17 mg/dL    GFR Estimate 22 (L) >60 mL/min/1.73m2    Calcium 9.8 8.8 - 10.2 mg/dL    Chloride 130 (H) 98 - 107 mmol/L    Glucose 140 (H) 70 - 99 mg/dL    Alkaline Phosphatase 57 40 - 150 U/L    AST 28 0 - 45 U/L    ALT 15 0 - 70 U/L    Protein Total 6.5 6.4 - 8.3 g/dL    Albumin 3.3 (L) 3.5 - 5.2 g/dL    Bilirubin Total 0.3 <=1.2 mg/dL   Lactic Acid     Status: Normal   Result Value Ref Range     Lactic Acid 1.4 0.7 - 2.0 mmol/L   UA with Microscopic reflex to Culture     Status: Abnormal    Specimen: Urine, Straight Catheter   Result Value Ref Range    Color Urine Yellow Colorless, Straw, Light Yellow, Yellow    Appearance Urine Clear Clear    Glucose Urine Negative Negative mg/dL    Bilirubin Urine Negative Negative    Ketones Urine Negative Negative mg/dL    Specific Gravity Urine 1.022 1.003 - 1.035    Blood Urine Negative Negative    pH Urine 5.0 5.0 - 7.0    Protein Albumin Urine 20 (A) Negative mg/dL    Urobilinogen Urine Normal Normal, 2.0 mg/dL    Nitrite Urine Negative Negative    Leukocyte Esterase Urine Negative Negative    Bacteria Urine Few (A) None Seen /HPF    Mucus Urine Present (A) None Seen /LPF    RBC Urine 2 <=2 /HPF    WBC Urine 4 <=5 /HPF    Squamous Epithelials Urine <1 <=1 /HPF    Transitional Epithelials Urine <1 <=1 /HPF    Hyaline Casts Urine 6 (H) <=2 /LPF    Narrative    Urine Culture not indicated   CBC with platelets and differential     Status: Abnormal   Result Value Ref Range    WBC Count 21.1 (H) 4.0 - 11.0 10e3/uL    RBC Count 3.27 (L) 4.40 - 5.90 10e6/uL    Hemoglobin 10.3 (L) 13.3 - 17.7 g/dL    Hematocrit 33.0 (L) 40.0 - 53.0 %     (H) 78 - 100 fL    MCH 31.5 26.5 - 33.0 pg    MCHC 31.2 (L) 31.5 - 36.5 g/dL    RDW 16.2 (H) 10.0 - 15.0 %    Platelet Count 171 150 - 450 10e3/uL    % Neutrophils 89 %    % Lymphocytes 4 %    % Monocytes 6 %    % Eosinophils 0 %    % Basophils 0 %    % Immature Granulocytes 1 %    NRBCs per 100 WBC 0 <1 /100    Absolute Neutrophils 18.9 (H) 1.6 - 8.3 10e3/uL    Absolute Lymphocytes 0.9 0.8 - 5.3 10e3/uL    Absolute Monocytes 1.2 0.0 - 1.3 10e3/uL    Absolute Eosinophils 0.0 0.0 - 0.7 10e3/uL    Absolute Basophils 0.0 0.0 - 0.2 10e3/uL    Absolute Immature Granulocytes 0.1 <=0.4 10e3/uL    Absolute NRBCs 0.0 10e3/uL   Troponin T, High Sensitivity     Status: Abnormal   Result Value Ref Range    Troponin T, High Sensitivity 140 (HH)  <=22 ng/L   Symptomatic Influenza A/B, RSV, & SARS-CoV2 PCR (COVID-19) Nose     Status: Normal    Specimen: Nose; Swab   Result Value Ref Range    Influenza A PCR Negative Negative    Influenza B PCR Negative Negative    RSV PCR Negative Negative    SARS CoV2 PCR Negative Negative    Narrative    Testing was performed using the Xpert Xpress CoV2/Flu/RSV Assay on the [employer] GeneXpert Instrument. This test should be ordered for the detection of SARS-CoV-2, influenza, and RSV viruses in individuals who meet clinical and/or epidemiological criteria. Test performance is unknown in asymptomatic patients. This test is for in vitro diagnostic use under the FDA EUA for laboratories certified under CLIA to perform high or moderate complexity testing. This test has not been FDA cleared or approved. A negative result does not rule out the presence of PCR inhibitors in the specimen or target RNA in concentration below the limit of detection for the assay. If only one viral target is positive but coinfection with multiple targets is suspected, the sample should be re-tested with another FDA cleared, approved, or authorized test, if coinfection would change clinical management. This test was validated by the [facility]. These laboratories are certified under the Clinical Laboratory Improvement Amendments of 1988 (CLIA-88) as qualified to perform high complexity laboratory testing.   INR     Status: Abnormal   Result Value Ref Range    INR 1.87 (H) 0.85 - 1.15   Erythrocyte sedimentation rate auto     Status: Abnormal   Result Value Ref Range    Erythrocyte Sedimentation Rate 68 (H) 0 - 20 mm/hr   CRP inflammation     Status: Abnormal   Result Value Ref Range    CRP Inflammation 102.00 (H) <5.00 mg/L   Extra Tube     Status: None    Narrative    The following orders were created for panel order Extra Tube.  Procedure                               Abnormality         Status                     ---------                                -----------         ------                     Extra Red Top Tube[[id number]]                               Final result                 Please view results for these tests on the individual orders.   Extra Red Top Tube     Status: None   Result Value Ref Range    Hold Specimen JIC    Troponin T, High Sensitivity     Status: Abnormal   Result Value Ref Range    Troponin T, High Sensitivity 120 (HH) <=22 ng/L   EKG 12 lead     Status: None (Preliminary result)   Result Value Ref Range    Systolic Blood Pressure  mmHg    Diastolic Blood Pressure  mmHg    Ventricular Rate 139 BPM    Atrial Rate 170 BPM    MN Interval 136 ms    QRS Duration 138 ms     ms    QTc 620 ms    P Axis  degrees    R AXIS -54 degrees    T Axis 37 degrees    Interpretation ECG       Undetermined rhythm  Left axis deviation  Right bundle branch block  Possible Lateral infarct , age undetermined  Abnormal ECG     EKG 12 lead     Status: None (Preliminary result)   Result Value Ref Range    Systolic Blood Pressure  mmHg    Diastolic Blood Pressure  mmHg    Ventricular Rate 73 BPM    Atrial Rate 120 BPM    MN Interval  ms    QRS Duration 138 ms     ms    QTc 478 ms    P Axis  degrees    R AXIS -47 degrees    T Axis -17 degrees    Interpretation ECG       Atrial fibrillation with premature ventricular or aberrantly conducted complexes  Left axis deviation  Right bundle branch block  Possible Lateral infarct , age undetermined  Inferior infarct , age undetermined  Abnormal ECG     CBC with Platelets & Differential     Status: Abnormal    Narrative    The following orders were created for panel order CBC with Platelets & Differential.  Procedure                               Abnormality         Status                     ---------                               -----------         ------                     CBC with platelets and d...[[id number]]  Abnormal            Final result                 Please view results for  these tests on the individual orders.     Medications   piperacillin-tazobactam (ZOSYN) 3.375 g vial to attach to  mL bag (3.375 g Intravenous $New Bag 2/15/24 1706)   pharmacy alert - intermittent dosing (has no administration in time range)   vancomycin (VANCOCIN) 1,250 mg in 0.9% NaCl 250 mL intermittent infusion (has no administration in time range)   vancomycin place vega - receiving intermittent dosing (has no administration in time range)   lactated ringers BOLUS 1,000 mL (0 mLs Intravenous Stopped 2/15/24 1439)   perflutren diluted 1mL to 2mL with saline (OPTISON) diluted injection 6 mL (6 mLs Intravenous $Given 2/15/24 1511)     Labs Ordered and Resulted from Time of ED Arrival to Time of ED Departure   COMPREHENSIVE METABOLIC PANEL - Abnormal       Result Value    Sodium 164 (*)     Potassium 4.5      Carbon Dioxide (CO2) 18 (*)     Anion Gap 16 (*)     Urea Nitrogen 107.0 (*)     Creatinine 2.73 (*)     GFR Estimate 22 (*)     Calcium 9.8      Chloride 130 (*)     Glucose 140 (*)     Alkaline Phosphatase 57      AST 28      ALT 15      Protein Total 6.5      Albumin 3.3 (*)     Bilirubin Total 0.3     ROUTINE UA WITH MICROSCOPIC REFLEX TO CULTURE - Abnormal    Color Urine Yellow      Appearance Urine Clear      Glucose Urine Negative      Bilirubin Urine Negative      Ketones Urine Negative      Specific Gravity Urine 1.022      Blood Urine Negative      pH Urine 5.0      Protein Albumin Urine 20 (*)     Urobilinogen Urine Normal      Nitrite Urine Negative      Leukocyte Esterase Urine Negative      Bacteria Urine Few (*)     Mucus Urine Present (*)     RBC Urine 2      WBC Urine 4      Squamous Epithelials Urine <1      Transitional Epithelials Urine <1      Hyaline Casts Urine 6 (*)    CBC WITH PLATELETS AND DIFFERENTIAL - Abnormal    WBC Count 21.1 (*)     RBC Count 3.27 (*)     Hemoglobin 10.3 (*)     Hematocrit 33.0 (*)      (*)     MCH 31.5      MCHC 31.2 (*)     RDW 16.2 (*)      Platelet Count 171      % Neutrophils 89      % Lymphocytes 4      % Monocytes 6      % Eosinophils 0      % Basophils 0      % Immature Granulocytes 1      NRBCs per 100 WBC 0      Absolute Neutrophils 18.9 (*)     Absolute Lymphocytes 0.9      Absolute Monocytes 1.2      Absolute Eosinophils 0.0      Absolute Basophils 0.0      Absolute Immature Granulocytes 0.1      Absolute NRBCs 0.0     TROPONIN T, HIGH SENSITIVITY - Abnormal    Troponin T, High Sensitivity 140 (*)    INR - Abnormal    INR 1.87 (*)    ERYTHROCYTE SEDIMENTATION RATE AUTO - Abnormal    Erythrocyte Sedimentation Rate 68 (*)    CRP INFLAMMATION - Abnormal    CRP Inflammation 102.00 (*)    TROPONIN T, HIGH SENSITIVITY - Abnormal    Troponin T, High Sensitivity 120 (*)    LACTIC ACID WHOLE BLOOD - Normal    Lactic Acid 1.4     INFLUENZA A/B, RSV, & SARS-COV2 PCR - Normal    Influenza A PCR Negative      Influenza B PCR Negative      RSV PCR Negative      SARS CoV2 PCR Negative     BASIC METABOLIC PANEL   BLOOD CULTURE   BLOOD CULTURE     Head CT w/o contrast   Final Result   Impression:   1. No acute intracranial pathology.    2. Moderate generalized parenchymal volume loss and leukoaraiosis.    3. Right perirolandic chronic insult.   4. Right mastoid and middle ear cavity effusion.      THOMAS MCHUGH MD            SYSTEM ID:  [id number]      XR Chest Port 1 View   Final Result   IMPRESSION:   Unchanged perihilar and bibasilar streaky/patchy opacity. No   appreciable pneumothorax or acute pulmonary consolidations.      I have personally reviewed the examination and initial interpretation   and I agree with the findings.      AFIA DANIELS MD            SYSTEM ID:  [id number]      Echo Complete    (Results Pending)          Critical care was not performed.     Medical Decision Making  The patient's presentation was of high complexity (an acute health issue posing potential threat to life or bodily function).    The patient's evaluation  involved:  review of 3+ test result(s) ordered prior to this encounter (foot CT, baseline Cr, prior chest xray, prior CBC)  discussion of management or test interpretation with another health professional (IM triage hospitalist)    The patient's management necessitated high risk (a decision regarding hospitalization).    Assessment & Plan    88-year-old male who presents with generalized weakness, inability to care for himself, and unable to eat and drink as usual.  Here in the ED he arrives with stable vitals. He was on O2 by oxiplus mask on arrrival. We were able to titrate back to room air with a room air saturation of 96%.    Differential is broad and includes infectious and metabolic causes for delirium.   IV access obtained and workup included CBC, comprehensive metabolic panel, lactic acid, urinalysis, blood cultures, and troponin.   Initial EKG with chronic RBBB and regular rhythm. Repeat EKG shows rate controlled atrial fibrillation with PVC. Monitored and telemetry and PVCs were rare.     CT head without new findings other than right mastoid and middle ear effusion.   CXR independently interpreted by me and shows unchanged perihilar and bibasilar streaky/patchy opacity.   Labs notable for new ARSALAN with Cr 2.73. Na and Cl markedly elevated. I have concern this may be a lab error or contamination by IVF. Will recheck BMP now.   Troponin elevated at 140. EKG without clear new ischemic changes. Repeat 120. Pt is already anticoagulated on coumadin with INR 1.8, so will not add heparin at this time. Echocardiogram ordered and cards interventional consulted.     WBC elevated to 21, with CRP of 102. At risk for sepsis, with ddx including UTI, recurrent osteo, bacteremia. Blood culture x2 sent. Lactic acid 1.4 and BP stable. Started on IV vancomycin and zosyn.     UA without signs of UTI. COVID/Flu/RSV negative.     Signed out to Dr. Franklin with repeat BMP pending. Will need to contact triage hospitalist again as  well.         This part of the medical record was transcribed by Kacey Chaudhry, Medical Scribe, from a dictation done by Nini Serna MD.     I have reviewed the nursing notes. I have reviewed the findings, diagnosis, plan and need for follow up with the patient.    New Prescriptions    No medications on file       Final diagnoses:   Acute kidney injury (H24)   Failure to thrive in adult   NSTEMI (non-ST elevated myocardial infarction) (H)       Nini Serna MD  [facility] EMERGENCY DEPARTMENT  2/15/2024     Nini Serna MD  02/15/24 5029

## 2024-02-15 NOTE — PROGRESS NOTES
Virtual Visit Details    Type of service:  Video Visit   Video Start Time: 10:20 am   Video End Time: 10:45    Originating Location (pt. Location): Home    Distant Location (provider location):  On-site  Platform used for Video Visit: Mireya

## 2024-02-15 NOTE — NURSING NOTE
Is the patient currently in the state of [place]? YES    Visit mode:VIDEO    If the visit is dropped, the patient can be reconnected by: VIDEO VISIT: Send to e-mail at: [email or web address]    Will anyone else be joining the visit? YES: How would they like to receive their invitation? Send to e-mail: [email or web address]  (If patient encounters technical issues they should call [phone]    How would you like to obtain your AVS? MyChart    Are changes needed to the allergy or medication list? Pt stated no changes to allergies and Pt stated no med changes    Reason for visit: RECHECK and [facility]    Check in with wife, Rica, PT is unable to speak.  Rica is Health Care Agent    Shiv COOK

## 2024-02-15 NOTE — PROGRESS NOTES
=================  Subjective  =====================  Rica is the wife of patient.   Sha Florence is an 88 y.o. male with PMH as below who presents via wife over video visit for the following.     Wife calling to   know the criteria to meet in case he needs to go to the ER   If can order hospice  Evaluation of where he is.     History of 2nd toe osteomyelitis which 12/8-12/21 at [facility] and after discharge they were scheduled for physical, speech therapy for swallowing, occupational therapy.   They started later and he completed this for 3 weeks, and regarding his speech therapy he has been talking and eating fine although hasn't been able to feed himself.   Two days ago he didn't seem to be himself.   He pockets food sometimes and re  Yesterday a visiting nurse came in and he wasn't as responsive.   Since yesterday, he hasn't been able to eat.   He has been sleeping all day since 3 days ago.   Wife is concerned that maybe he is not  as expressive and not stating much about any pain.   He hasn't urinated since 9pm last night, less interactive today, and sleeping throughout interview     Visiting nurse recommended   [facility]. In [place].   They have in situ hospice but also have [facility]       Wife and family agreeable to go to ER to ensure no reversible cause as patient is susceptible to multiple urinary tract infectiosn.   ==============  Objective  ==============  There were no vitals taken for this visit. .    Physical Exam limited due to video visit.   Lying in bed in blue blanket, unresponsive to verbal stimulus by family or touch.   Does not appear to be in respiratory distress.     Recent labs INR 2/14 - 1.7   No recent imaging.   ===================  Assessment and Plan   ===================  Noe Florence is an 88 y.o. male with PMH notable for CKD, T2DM, DVT on anticoagulation,  recent hospitalization for left foot osteomyelitis with recent speech, OT/PT who  presents via video visit today for wife's concerns in setting of poor PO intake in the last 72 hours, decreasing responsiveness.   During my video visit patient unresponsive and per wife has been anuric in the last 12 hours. Concern for acute kidney injury and failure to thrive and anuria, or possible UTI or other infection.  Given patient prior wishes for resuscitation and acute status change apt to call EMS and go to the ED and patient and family agreeable to this. There they can discuss hospice once any  irreversible causes are treated and if it is so deemed apt.   Case discussed and patient's wife seen on video with attending Dr. Wang.     There are no diagnoses linked to this encounter.      Rosanna Lin MD  PGY3   Internal medicine resident   [facility]

## 2024-02-16 NOTE — CONSULTS
[facility]  WO Nurse Inpatient Assessment     Consulted for: Multiple pressure injuries, all present on admission    Patient History (according to provider note(s):      Per Christy Singh NP on 2/16/2024:  Noe Florence is a 88 year old male with PMH notable for CKD3 (baseline Cr ~1.4-1.8), Afib, CAD with past CABG and stents, ischemic cardiomyopathy, polymorphic V-tach s/p ICD, monoclonal gammopathy of unknown significance, polyneuropathy, L common iliac aneurysm, polymyalgia rheumatica, past colon cancer, recent treatment for L 2nd toe osteomyelitis. Admitted to [facility] ICU for sepsis, AMS, ARSALAN, and profound hypernatremia likely in the setting of FTT.     Assessment:      Areas visualized during today's visit: Complete head to toe     Pressure Injury Location: Right lateral hip    2/16 Right lateral hip    Last photo: 2/16/2024  Wound type: Pressure Injury     Pressure Injury Stage: Deep Tissue Pressure Injury (DTPI), present on admission   Wound history/plan of care:   Limited mobility, present on admission    Wound base: 25 % purple and epidermis, surrounded by pink, non-blanchable intact epidermis     Palpation of the wound bed: normal      Drainage: none     Description of drainage: none     Measurements (length x width x depth, in cm) 8  x 2  x  0 cm   Periwound skin: Intact      Color: normal and consistent with surrounding tissue      Temperature: normal   Odor: none  Pain: Irritable or crying out at intervals, due to generalized pain  Pain intervention prior to dressing change: slow and gentle cares   Treatment goal: Protection  STATUS: initial assessment  Supplies ordered: supplies stored on unit    Pressure Injury Location: Right lateral upper thigh    2/16 Right lateral upper thigh    Last photo: 2/16/2024  Wound type: Pressure Injury     Pressure Injury Stage: Deep Tissue Pressure Injury (DTPI), present on admission   Wound history/plan of care:     Limited mobility, present on admission    Wound base: 100 % maroon and epidermis     Palpation of the wound bed: normal      Drainage: none     Description of drainage: none     Measurements (length x width x depth, in cm) 4  x 2  x  0 cm   Periwound skin: Intact      Color: normal and consistent with surrounding tissue      Temperature: normal   Odor: none  Pain: Irritable or crying out at intervals due to generalized pain  Pain intervention prior to dressing change: slow and gentle cares   Treatment goal: Protection  STATUS: initial assessment  Supplies ordered: supplies stored on unit    Pressure Injury Location: Right elbow    2/16 Right elbow    Last photo: 2/16/2024  Wound type: Pressure Injury     Pressure Injury Stage: Unstageable, present on admission   Wound history/plan of care:    Limited mobility, present on admission    Wound base: 50 % eschar, 50 % non-blanchable, maroon, and epidermis     Palpation of the wound bed: normal      Drainage: scant     Description of drainage: serosanguinous     Measurements (length x width x depth, in cm) 8  x 2  x  0.2 cm      Tunneling N/A     Undermining N/A  Periwound skin: Intact      Color: pink      Temperature: normal   Odor: none  Pain: Irritable or crying out at intervals due to generalized pain  Pain intervention prior to dressing change: slow and gentle cares   Treatment goal: Protection  STATUS: initial assessment  Supplies ordered: supplies stored on unit    Pressure Injury Location: Right lateral ankle    2/16 Right lateral ankle    Last photo: 2/16/2024  Wound type: Pressure Injury     Pressure Injury Stage: Unstageable, present on admission   Wound history/plan of care:    Limited mobility, present on admission    Wound base: 50 % eschar, 50 % dermis     Palpation of the wound bed: normal      Drainage: scant     Description of drainage: serous     Measurements (length x width x depth, in cm) 2  x 2  x  0.2 cm      Tunneling N/A     Undermining  N/A  Periwound skin: Intact      Color: normal and consistent with surrounding tissue      Temperature: normal   Odor: none  Pain: Irritable or crying out at intervals due to generalized pain  Pain intervention prior to dressing change: slow and gentle cares   Treatment goal: Protection  STATUS: initial assessment  Supplies ordered: supplies stored on unit    Pressure Injury Location: Left heel    2/16    Last photo: 2/16/2024  Wound type: Pressure Injury     Pressure Injury Stage: Deep Tissue Pressure Injury (DTPI), present on admission   Wound history/plan of care:    Limited mobility, present on admission    Wound base: 100 % purple and epidermis     Palpation of the wound bed: normal      Drainage: none     Description of drainage: none     Measurements (length x width x depth, in cm) 4  x 4  x  0 cm      Tunneling N/A     Undermining N/A  Periwound skin: Intact      Color: pink      Temperature: normal   Odor: none  Pain: Irritable or crying out at intervals due to generalized pain  Pain intervention prior to dressing change: slow and gentle cares   Treatment goal: Protection  STATUS: initial assessment  Supplies ordered: supplies stored on unit    Pressure Injury Location: Sacrum/coccyx    2/16 Sacrum and coccyx    Last photo: 2/16/2024  Wound type: Pressure Injury     Pressure Injury Stage: Unstageable, present on admission   Wound history/plan of care:    Limited mobility, present on admission    Wound base: Sacrum: 50 % eschar, 50 % slough; Coccyx: 100% slough     Palpation of the wound bed: normal      Drainage: none     Description of drainage: none     Measurements (length x width x depth, in cm)  Total area 7  x 10  x  0.4 cm      Tunneling N/A     Undermining N/A  Periwound skin: Erythema- blanchable      Color: pink      Temperature: normal   Odor: none  Pain: Irritable or crying out at intervals due to generalized pain  Pain intervention prior to dressing change: slow and gentle cares   Treatment  goal: Protection  STATUS: initial assessment  Supplies ordered: supplies stored on unit    Wound location: Right wrist    2/16 Right wrist    Last photo: 2/16/2024  Wound due to: Skin Tear  Wound history/plan of care: Skin tear present on admission, prior to line placement  Wound base: 25 dermis, 75 % fibrin     Palpation of the wound bed: normal      Drainage: scant     Description of drainage: serous     Measurements (length x width x depth, in cm): 2  x 1  x  0.2 cm      Tunneling: N/A     Undermining: N/A  Periwound skin: Intact      Color: pink      Temperature: normal   Odor: none  Pain: Irritable or crying out at intervals due to generalized pain  Pain interventions prior to dressing change: slow and gentle cares   Treatment goal: Heal  and Protection  STATUS: initial assessment  Supplies ordered: supplies stored on unit    Wound location: Left toes    2/16 Left toes    Last photo: 2/16/2024  Wound due to: Trauma vs pressure from deformity  Wound history/plan of care: Patient follows with Podiatry for known osteo in left 2nd toe, not a surgical candidate for deformity correction at this time.   Wound base: 100 % dermis     Palpation of the wound bed: normal      Drainage: scant     Description of drainage: serous     Measurements (length x width x depth, in cm): see photo  Periwound skin: Intact      Color: pink      Temperature: normal   Odor: none  Pain: Irritable or crying out at intervals due to generalized pain   Pain interventions prior to dressing change: slow and gentle cares   Treatment goal: Protection  STATUS: initial assessment  Supplies ordered: supplies stored on unit    Treatment Plan:      Right elbow wound: Daily : Remove dressing and wash wounds with wound cleanser and gauze. Cover open areas with Xeroform and Optifoam. Secure dressing with Kerlix. No Mepilex to this area as surrounding skin is too fragile to tolerate adhesives.     Right hip, right lateral upper thigh, right lateral ankle,  "left posterior heel, sacrum and coccyx: Every other day: Remove dressings and wash wounds with wound cleanser and gauze. Pat dry. Cover with Mepilex.     Right wrist: Daily: Wash with saline and gauze. Cover open area with Vaseline gauze, cover with 4x4 and secure with Kerlix.    Left toes: Daily: Remove dressing and wash wound with wound cleanser and gauze. Cover open areas with Xeroform. Secure dressing with Kerlix.    Pressure Injury Prevention (PIP) Plan:  If patient is declining pressure injury prevention interventions: Explore reason why and address patient's concerns, Educate on pressure injury risk and prevention intervention(s), If patient is still declining, document \"informed refusal\" , and Ensure Care team is aware ( provider, charge nurse, etc)  Mattress: Follow bed algorithm, reassess daily and order specialty mattress, if indicated.  HOB: Maintain at or below 30 degrees, unless contraindicated  Repositioning in bed: Every 1-2 hours , Raise foot of bed prior to raising head of bed, to reduce patient sliding down (shear), and Frequent microturns using TAPS wedges, as patient tolerates  Heels: Heel lift boots  Protective Dressing: Sacral Mepilex for prevention (#462781),  especially for the agitated patient   Positioning Equipment: TAPS wedges (#889722) to help maintain 30 degree side lying position  and Seated Positioning System (SPS)  (#042755) Sling must have contact with chair, no pillow or chair cushion beneath  Chair positioning: Chair cushion (#664228)    If patient has a buttock pressure injury, or high risk for PI use chair cushion or SPS.  Moisture Management: Perineal cleansing /protection: Follow Incontinence Protocol and Avoid brief in bed  Under Devices: Inspect skin under all medical devices during skin inspection , Ensure tubes are stabilized without tension, and Ensure patient is not lying on medical devices or equipment when repositioned  Ask provider to discontinue device when no " longer needed.      Orders: Written    RECOMMEND PRIMARY TEAM ORDER: None, at this time  Education provided: importance of repositioning and plan of care  Discussed plan of care with: Nurse  WO nurse follow-up plan: weekly  Notify WOC if wound(s) deteriorate.  Nursing to notify the Provider(s) and re-consult the WO Nurse if new skin concern.    DATA:     Current support surface: Standard  Low air loss (KISHAN pump, Isolibrium, Pulsate, skin guard, etc)  Containment of urine/stool: Indwelling catheter  BMI: Body mass index is 23.17 kg/m .   Active diet order: Orders Placed This Encounter      NPO for Medical/Clinical Reasons Except for: Meds     Output: I/O last 3 completed shifts:  In: 4440.51 [I.V.:3925.51; IV Piggyback:515]  Out: 357 [Urine:357]     Labs:   Recent Labs   Lab 02/16/24  0526 02/16/24  0119   ALBUMIN  --  2.9*   HGB 7.5* 9.3*   INR 1.90*  --    WBC 13.4* 14.1*     Pressure injury risk assessment:   Sensory Perception: 2-->very limited  Moisture: 3-->occasionally moist  Activity: 1-->bedfast  Mobility: 2-->very limited  Nutrition: 2-->probably inadequate  Friction and Shear: 2-->potential problem  Chandler Score: 12    Cheri Turner RN CWOCN  Pager no longer is use, please contact through [employer] group: [facility]  Dept. Office Number: [phone]

## 2024-02-16 NOTE — PROGRESS NOTES
[facility] [place] PROGRESS NOTE  02/16/2024      Date of Service (when I saw the patient): 02/16/2024    ASSESSMENT: Noe Florence is a 88 year old male with PMH notable for CKD3 (baseline Cr ~1.4-1.8), Afib, CAD with past CABG and stents, ischemic cardiomyopathy, polymorphic V-tach s/p ICD, monoclonal gammopathy of unknown significance, polyneuropathy, L common iliac aneurysm, polymyalgia rheumatica, past colon cancer, recent treatment for L 2nd toe osteomyelitis. Admitted to [facility]- ICU for sepsis, AMS, ARSALAN, and profound hypernatremia likely in the setting of FTT.     CHANGES and MAJOR THINGS TODAY:   - Stop zosyn, start ceftriaxone and continue vanco   - Sodium q8hrs  - Met with palliative, DNR/DNI   - NG placement  - PRN oxy for pain       PLAN:    Neuro:  # Multifactorial toxic metabolic encephalopathy 2/2 hypernatremia, ARSALAN with uremia, and sepsis  # Polyneuropathy  # Depression  # ELVIN  Recently admitted 12/8/23-12/21/23 for AMS and L 2nd toe osteomyelitis. Discharged and living at home in usual state of health. On day of presentation (2/15) he was essentially unresponsive prompting family to bring him to the ED. Head CT with acute intracranial anomalies.   Seizure precautions  Neuro checks q2h  Acetaminophen PRN   Oxy 2.5 mg PRN   Continue Zoloft 200 mg qPM on 2/16  Hold PTA Remeron 15 mg at bedtime, risperidone 0.5 mg AM and 1 mg PM, and baclofen 10 mg TID --> resume as mental status allows      Pulmonary:  # Acute hypoxia, resolved  Hypoxic when EMS picked him up. Titrated off supplemental O2 in ED. Protecting airway on ICU arrival. CXR 2/15/24: Unchanged perihilar and bibasilar streaky/patchy opacities.   Supplemental O2 to keep SpO2 > 90%   IS q1h while awake     Cardiovascular:  # Troponemia, likely demand with clearance worsened by renal dysfunction, improved   # Afib  # Ischemic cardiomyopathy  # Hypothermia, resolved   # CAD    # HTN  # Hx polymorphic V-tach s/p ICD placement   Troponin continues to  "trend down, last check 102. EKG on arrival showed SR with RBB.   TTE in ED 2/15/24: Small LV cavity, EF 45-50%, mildly reduced RV & LV function, mitral annuloplasty ring in good position, est RAP normal (EF 55-60% on last ECHO in 2017).  Goal MAP > 65 mmHg  Hold PTA metoprolol tartrate 12.5 mg BID  Continue PTA warfarin evening of 2/16, goal INR 1.5-2.5 given frequent falls per last cards note   Nuvia hugger to maintain normothermia      GI/Nutrition:  # Anorexia  # Concern for dysphagia   # Risk for malnutrition, hypoalbuminemia   # Constipation   Per wife, in the days preceding admission pt was reporting trouble swallowing and was at time caught \"pocketing\" food.  No reports to vomiting or diarrhea. Last hospitalization discharged at 70.3 kg, currently 67.1 kgs.   RD consult appreciate recs    SLP consult appreciate recs  NPO   Place NG   Scheduled senna-docusate, miralax and supp PRN, last BM ~ 3-4 days ago     Renal/Fluids/Electrolytes:  # Profound hypernatremia, suspect chronic likely 2/2 dehydration and FTT  # ARSALAN on CKD3, prerenal in the setting of dehydration  # AGAMA, resolved   # BPH  Presented with hypernatremia Na+ 164 and ARSALAN on CKD Cr 2.73. Would suspect hypernatremia has been present > 48 hours as symptoms of FTT developed slowly over ~ 3 days with limited po intake, suggesting chronic process. Renal US negative for hydronephrosis.   Na+ correction- no greater than 12 mEq/hr in 24 hrs  Adjust D5 infusion for sodium goal     Sodium checks q8hrs, daily RICARDO Dominguez for accurate I/Os  Accurate I/Os with daily weights   Hold PTA urea gel and flomax   Spot replace electrolytes     Endocrine:  # Stress induced hyperglycemia  Goal -180  No current indication for insulin  Hypoglycemia protocol      ID:  # Sepsis, unclear source, likely ongoing osteomyelitis vs new wound infection   # Leukocytosis 2/2 above   # GPCs in BC - contaminant vs true infection  # R middle ear effusion  # Numerous new pressure " wounds  # Recent treatment for L 2nd toe osteomyelitis  # recent COVID-19 infection (12/10/23), now recovered   Recently treated from acute on chronic L 2nd toe osteomyelitis. Stopped abx on 1/31/24 after following up with ID. However, he now has numerous pressure wounds involving feet, hips, arms, and coccyx (photos in chart), but none with cellulitic appearance or overt drainage. CXR without acute infiltrates or consolidations. 2 of 2 BC in 1 of 2 cx thusfar growing GPCs, unclear at this point if contaminant vs true infection but pt does have multiple portals of entry with new skin lesions. UA without overt signs of infection. Head CT notable for R middle ear effusion.  ID consult, appreciate recs  Daily CBC     Relevant cultures:  - 12/10/23 COVID (+)  - 2/15/24 BC - GPCs in 2 of 2 bottles, 1 of 2 cultures   - 2/15 UA - few bacteria, no reflex  - 2/16 MRSA nares - Negative   - 2/16 BC ordered for AM    Antibiotics:  - Vancomycin (2/15 - current )  - Zosyn (2/15 -2/16)  - Ceftriaxone (2/16- current)         Hematology/Vascular:    # Chronic anemia, now with mild macrocytosis -->  suspect nutritional component  # Thrombocytopenia  # Hx DVT, on warfarin  # L common iliac artery aneurysm  # Hx MGUS  # Drug induced coagulation defect   Continue PTA warfarin tonight with goal INR 1.5-2.5 d/t frequent falls   Daily CBC    Patient to f/u with Vascular Surgery as outpatient.     # Hx of bilateral lower extremity edema 2/2 venous insufficiency  Generally wears compression stockings, but was instructed not to wear them following most recent hospitalization. Vascular surgery recommends he has some compression therapy, but agrees with no stockings while pt has an active foot wound  Hold off on lymphedema consult as pt is currently unable to communicate CMS      Musculoskeletal:  # Osteoporosis  # Chronic foot wounds with pain leading to intolerance of walking  # Frailty  # FTT  In regards to chronic management of the L toe  wounds, recently evaluated by vascular surgery who felt his wounds were not ischemic and were rather pressure related. Has also been seen by orthopedics who recommend medical management. No weight bearing restrictions, but patient is immobile at baseline.   Hold PTA fosamax while inpt, continue calcium supplement  PT/OT consult     Skin:  # Chronic L 2nd toe wound  # New pressure wounds involving coccyx, R hip, R elbow, R ankle, and L heel  WOC consult   Photos in chart 2/16    General Cares/Prophylaxis:    DVT Prophylaxis: Warfarin  GI Prophylaxis: Not indicated  Restraints: NA  Family Communication: Partner Nicolette updated at bedside  Code Status: DNR/DNI    Lines/tubes/drains:  - PIV x2  - Dominguez  - Art line     Disposition:  - [facility] ICU     Patient seen and findings/plan discussed with medical ICU staff, Dr. Najera.    MARIA LUISA Virk CNP    ====================================  INTERVAL HISTORY:   No acute events. Hypernatremia improving. Met with palliative at bedside. Made DNR/DNI. Family wanting to proceed with NG placement. Care coordinator reaching out to hospice- plan to readdress on Monday.     OBJECTIVE:   1. VITAL SIGNS:   Temp:  [95  F (35  C)-98.1  F (36.7  C)] 97.1  F (36.2  C)  Pulse:  [] 76  Resp:  [10-27] 27  BP: ()/(49-75) 100/51  SpO2:  [93 %-100 %] 96 %  Resp: 27    2. INTAKE/ OUTPUT:   I/O last 3 completed shifts:  In: 4440.51 [I.V.:3925.51; IV Piggyback:515]  Out: 357 [Urine:357]    3. PHYSICAL EXAMINATION:  General: Frail, chronically ill appearing, laying in bed in NAD  HEENT: Normocephalic, atraumatic, oral mucosa and lips dry, neck with mild kyphotic flexture  Neuro: ABELARDO orientation, pt not speaking, pupils 2mm, blinks to threat and tracks, moves all extremities, unable to assess strength however  Pulm/Resp: Clear breath sounds bilaterally without rhonchi, crackles or wheeze, breathing non-labored  CV: RRR, no RMG, extremities warm, pulses 2+, 1+ pitting edema extending  to the hips and also involving the hands   Abdomen: Soft, non-distended, non-tender  : mo catheter in place, urine yellow and clear  Incisions/Skin: Chronic flat brown plaques involving abd and thoracic cage. Pressure sores noted to the L 1-3 toes, coccyx, R hip, R elbow, R ankle, and L heel. No surrounding erythema, exudates, or fluctuance appreciated        4. LABS:   Arterial Blood Gases   Recent Labs   Lab 02/16/24 0526   PH 7.35   PCO2 33*   PO2 101   HCO3 19*     Complete Blood Count   Recent Labs   Lab 02/16/24 0526 02/16/24  0119 02/15/24  1250   WBC 13.4* 14.1* 21.1*   HGB 7.5* 9.3* 10.3*   * 136* 171     Basic Metabolic Panel  Recent Labs   Lab 02/16/24 0526 02/16/24  0328 02/16/24  0119 02/16/24  0057 02/15/24  2107 02/15/24  1815 02/15/24  1618 02/15/24  1250   *  162*  --  161*  --  162*   < > 162* 164*   POTASSIUM 3.5  --  3.6  --   --   --  4.0 4.5   CHLORIDE 133*  --  131*  --   --   --  128* 130*   CO2 18*  --  18*  --   --   --  20* 18*   BUN 92.4*  --  95.1*  --   --   --  104.0* 107.0*   CR 2.15*  --  2.39*  --   --   --  2.65* 2.73*   * 127* 136* 120*  --   --  144* 140*    < > = values in this interval not displayed.     Liver Function Tests  Recent Labs   Lab 02/16/24 0526 02/16/24  0119 02/15/24  1250 02/15/24  1232 02/14/24  0000   AST  --  35 28  --   --    ALT  --  18 15  --   --    ALKPHOS  --  52 57  --   --    BILITOTAL  --  0.4 0.3  --   --    ALBUMIN  --  2.9* 3.3*  --   --    INR 1.90*  --   --  1.87* 1.7*     Coagulation Profile  Recent Labs   Lab 02/16/24  0526 02/15/24  1232 02/14/24  0000   INR 1.90* 1.87* 1.7*       5. RADIOLOGY:   Recent Results (from the past 24 hour(s))   XR Chest Port 1 View    Narrative    XR CHEST PORT 1 VIEW  2/15/2024 1:15 PM     HISTORY:  dyspnea       COMPARISON:  12/11/2023 CXR    TECHNIQUE: Portable, semiupright, frontal projection radiograph of the  chest.    FINDINGS:   Implantable cardiac defibrillator projects over  the left upper chest  with stable positioning of right atrial lead and right ventricular  shock coil. Mitral valve prosthesis. Postsurgical changes of the chest  with median sternotomy wires intact.    Cardiac mediastinal silhouette is stable. The trachea is midline. No  appreciable pneumothorax. Streaky opacities of the lower lungs and  perihilar regions. No focal pulmonary consolidations.     Visualized upper abdomen is unremarkable. No acute osseous  abnormalities.      Impression    IMPRESSION:  Unchanged perihilar and bibasilar streaky/patchy opacity. No  appreciable pneumothorax or acute pulmonary consolidations.    I have personally reviewed the examination and initial interpretation  and I agree with the findings.    AFIA DANIELS MD         SYSTEM ID:  [id number]   Head CT w/o contrast    Narrative    CT HEAD W/O CONTRAST 2/15/2024 2:11 PM    History: AMS     Comparison: Head CT from 9/11/2016.    Technique: Using multidetector thin collimation helical acquisition  technique, axial, coronal and sagittal CT images from the skull base  to the vertex were obtained without intravenous contrast.   (topogram) image(s) also obtained and reviewed.    Findings:   Images are degraded aided by motion artifact.  There is no intracranial hemorrhage, mass effect, or midline shift.  Gray/white matter differentiation in both cerebral hemispheres is  preserved. Ventricles are proportionate to the cerebral sulci.  Moderate generalized parenchymal volume loss. White matter hyper  attenuation, most likely represents chronic small vessel ischemic  disease. The right perirolandic chronic insult (series 3 image 16).  The basal cisterns are clear. Arterial consultations.    The bony calvaria and the bones of the skull base are normal. The  visualized portions of the paranasal sinuses are clear. Right mastoid  and middle ear cavity effusion. Bilateral pseudophakia.      Impression    Impression:  1. No acute intracranial  pathology.   2. Moderate generalized parenchymal volume loss and leukoaraiosis.   3. Right perirolandic chronic insult.  4. Right mastoid and middle ear cavity effusion.    THOMAS MCHUGH MD         SYSTEM ID:  [id number]   Echo Complete   Result Value    LVEF  45-50% (mildly reduced)    [facility]    [id number]  [id number]  [id number]  [id number]^JULIO CESAR^JENNIFER^THOMAS     [facility]  Echocardiography Laboratory  [address] [place] [id number]     Name: LEROY MACHADO  MRN: [id number]  : 1935  Study Date: 02/15/2024 02:55 PM  Age: 88 yrs  Gender: Male  Patient Location: [facility]  Reason For Study: Acute Myocardial Infarction  Ordering Physician: JENNIFER HARRELL  Performed By: Jessica Cedeno     BSA: 1.8 m2  Height: 67 in  Weight: 155 lb  HR: 62  BP: 126/80 mmHg  ______________________________________________________________________________  Procedure  Complete Portable Echo Adult. Contrast Optison. Optison (NDC #6368-0523-77)  given intravenously. Patient was given 6 ml mixture of 3 ml Optison and 6 ml  saline. 3 ml wasted.  ______________________________________________________________________________  Interpretation Summary  Left ventricular cavity size is small. Left ventricular function is decreased.  The ejection fraction is 45-50% (mildly reduced).  Global right ventricular function is mildly reduced.  An annuloplasty ring is noted in the mitral position. There i no stenosis or  regurgitation.  Estimated mean right atrial pressure is normal.  No pericardial effusion is present.     ______________________________________________________________________________  Left Ventricle  Left ventricular cavity size is small. Mild to moderate concentric wall  thickening consistent with left ventricular hypertrophy is present. Left  ventricular function is decreased. The ejection fraction is 45-50% (mildly  reduced).     Right Ventricle  The right ventricle is normal size. Global  right ventricular function is  mildly reduced. A pacemaker lead is noted in the right ventricle.     Atria  The right atria appears normal. Mild left atrial enlargement is present.     Mitral Valve  An annuloplasty ring is noted in the mitral position. The mean gradient across  the mitral valve is 2 mmHg.     Aortic Valve  The aortic valve is tricuspid. Mild aortic valve calcification is present.     Tricuspid Valve  Mild tricuspid insufficiency is present. The right ventricular systolic  pressure is approximated at 23.0 mmHg plus the right atrial pressure.  Pulmonary artery systolic pressure is normal.     Pulmonic Valve  On Doppler interrogation, there is no significant stenosis or regurgitation.     Vessels  The aorta root is normal. The inferior vena cava was normal in size with  preserved respiratory variability. Estimated mean right atrial pressure is  normal.     Pericardium  No pericardial effusion is present.     ______________________________________________________________________________  MMode/2D Measurements & Calculations  IVSd: 1.4 cm  LVIDd: 4.1 cm  LVIDs: 2.8 cm  LVPWd: 1.3 cm  FS: 31.7 %     LV mass(C)d: 203.9 grams  LV mass(C)dI: 112.4 grams/m2  Ao root diam: 3.4 cm  asc Aorta Diam: 3.1 cm  LVOT diam: 2.3 cm  LVOT area: 4.1 cm2  Ao root diam index Ht(cm/m): 2.0  Ao root diam index BSA (cm/m2): 1.9  Asc Ao diam index BSA (cm/m2): 1.7  Asc Ao diam index Ht(cm/m): 1.8  LA Volume (BP): 71.8 ml  LA Volume Index (BP): 39.7 ml/m2     RWT: 0.61     Doppler Measurements & Calculations  MV E max katy: 69.7 cm/sec  MV A max katy: 111.1 cm/sec  MV E/A: 0.63  MV dec slope: 253.8 cm/sec2  MV dec time: 0.27 sec  Ao V2 max: 111.0 cm/sec  Ao max P.9 mmHg  Ao V2 mean: 61.0 cm/sec  Ao mean P.7 mmHg  Ao V2 VTI: 18.0 cm  MARIA VICTORIA(I,D): 4.5 cm2  MARIA VICTORIA(V,D): 3.6 cm2  LV V1 max PG: 3.8 mmHg  LV V1 max: 97.5 cm/sec  LV V1 VTI: 19.6 cm  SV(LVOT): 80.6 ml  SI(LVOT): 44.4 ml/m2  PA acc time: 0.09 sec  TR max katy: 240.0  cm/sec  TR max P.0 mmHg  AV Gabe Ratio (DI): 0.88  MARIA VICTORIA Index (cm2/m2): 2.5  E/E' avg: 10.0  Lateral E/e': 9.4  Medial E/e': 10.7     ______________________________________________________________________________  Report approved by: Evelio Delaney 02/15/2024 05:24 PM

## 2024-02-16 NOTE — PLAN OF CARE
10A ICU End of Shift Summary.     Changes this shift: Patient is nonverbal in bed and extremely lethargic. ART line was place for q2H sodium draws due to hypernatremia in 160's.  Neuro: Nonverbal (not baseline for patient) opens eyes to voice and stimuli, groans with turns and touch  Cardiac: NSR in 80's  Respiratory: Room air, shallow breaths, lung sounds clear/diminished. Perfusing well in high 90%'s   GI/: Dominguez in place with adequate output. No BM this shift  Diet/appetite: NPO - Hold oral morning medications per [facility]  Pain: No signs of pain - CPOT scoring  Skin: Right elbow, right side of back, right ankle, left heel, coccyx (all have wounds). Disperse bruising. Skin is very dry  LDA's: Right radial ART line. Left PIV x 2  Activities: Turns in bed q2H    Drips: D5% at 125mL/hr       Plan: Lower sodium levels at a steady rate until WDL

## 2024-02-16 NOTE — CONSULTS
VAT consulted to assess pt for PICC placement.    VAT tried calling primary RN @[phone] 2/16. Will call back to update bedside RN.    VAT is short staffed and will not be able to do PICC until 2/19/24.

## 2024-02-16 NOTE — CONSULTS
Palliative Care Consultation Note  [facility]    Patient: Noe Florence  Date of Admission:  2/15/2024    Requesting Clinician / Team: MICU  Reason for consult: Goals of care     Recommendations & Counseling     GOALS OF CARE:   Life-prolonging with limits (DNR/DNI). Considering transition to hospice care on discharge. If deteriorating, please call family to discuss transition to comfort care.  Met with Rica (partner of 24 years and HCA) and Danial (friend of 60 years) at bedside today with MICU team.  Key takeaways:  Family reports Sha has been declining for months, has not ambulated for 1 year and is now confined to bed or chair, dependent in all ADLs and requires lift to transfer or turn. He has expressed frustration with quality of life to partner Rica. When Sha acutely declined over the past several days, Rica considered enrolling him in hospice instead of taking him to the hospital.  Reviewed Sha's current medical issues and concern that these have arisen due to overall decline/failure to thrive. Also reviewed Sha's wishes as outlined in HCD, which indicate he would want treatment if there is a reasonable chance of recovery but would not want treatments that would only prolong dying process.  Extensive discussion of hospice care and different places in which hospice is provided. Rica is interested in facility placement for hospice care upon discharge but is very concerned about costs, appreciate SW assistance in discussing details.  Rica and Sang (son) are named as joint healthcare agents in Sha's HCD and Rica confirmed they will work together to make decisions on his behalf. Called Sang during our visit to review all of the above.  Provided medical recommendation for DNR/DNI given low likelihood of recovery and high risk of harm/suffering. Rica and Sang are in agreement with changing code status to DNR/DNI.  Rica and Sang would  like to wait another day or so to see how Sha does before making any further decisions. If improving, will work towards discharge to a facility (possibly with hospice support). If declining, will consider transition to comfort care or inpatient hospice.     ADVANCE CARE PLANNING:  Patient has an advance directive dated 5/2/2014.  Health Care Agents Rica Liboriomadeline (significant other) and See Florence (son). HCD indicates HCAs should work together.  POLST - complete prior to discharge  Code status: No CPR- Do NOT Intubate    PSYCHOSOCIAL/SPIRITUAL SUPPORT:  Family - strong support from long-term partner Rica (together 24 years, friends for 60 years), son Sang (local),   Friends - many close friends including Danial (retired RN)  [place] community: [employer], appreciate Spiritual Health support and anointing    Palliative Care will continue to follow.    Jessica Mccrary PA-C  Securely message with fg [employer] info)  Text page via [facility] Paging/Directory     Assessment      Sha is an 89 y/o man with history of CKD3, CAD s/p CABG and stents, ischemic cardiomyopathy, ICD in place, MGUS, colon cancer s/p resection, polymyalgia rheumatica, polyneuropathy, ELVIN and depression, recent admission for L toe osteomyelitis, admitted to ICU 2/16 for sepsis, altered mental status, ARSALAN and profound hypernatremia in setting of failure to thrive. Palliative consulted for goals of care.    Recently admitted 12/8/23-12/21/23 for AMS and L 2nd toe osteomyelitis.     Today, the patient was seen for:  Palliative encounter  Goals of care  Patient and family support  Sepsis  Severe hypernatremia  Altered mental statis  ARSALAN on CKD  Failure to thrive    Palliative Care Summary:   Sha is obtunded, opened eyes to voice but could not track.    Met with Jennifer at bedside today along with MICU team.      Introduced the role of palliative care as an interdisciplinary team that cares for patients with serious illness to help  support symptom management, communication, coping for patients and their families as well as support with medical decision making.    Prognosis, Goals, & Planning:    Functional Status just prior to this current hospitalization:  has been hospitalized 1 times in the past 2 months for recurrent infections.   There has been a decline in daily function including bedbound, requiring total care at home after gradual decline over the past year with acute worsening 3 days ago - since then sleeping most of the day and unable to take PO.   Weight down 8lbs in 2 months (5%)   Palliative Performance Scale (PPS): 30%  Extensive disease. Bedbound & requires total care, normal or reduced intake. Normal LOC or drowsy or confused.  Estimated Median Survival in Days: 8 to 41 days.     Prognosis, Goals, and/or Advance Care Planning:  We discussed general treatment options (full/restorative, selective/conservatives, and comfort only/hospice). We then discussed how these specifically apply to Bill.  Based on this discussion, Rica and Sang has decided to continue with selective care with limit of DNR/DNI.    Code Status was addressed today:   Yes, We discussed potential risks and rationale of attempting cardiac resuscitation, intubation, and mechanical ventilation.  We also discussed probability of survival as well as quality of life implications.  Based on this discussion, patient or surrogate response/decision: DNR/DNI      Patient's decision making preferences: unable to assess        Patient has decision-making capacity today for complex decisions:No          Coping, Meaning, & Spirituality:   Mood, coping, and/or meaning in the context of serious illness were addressed today: Yes    Social:   Living situation:lives with significant other/spouse  Important relationships/caregivers:Rica (partner of 24 years, friends for 60 years), Sang (son), friends  Occupation: book  for [employer]  Areas of fulfillment/eliane:  used to enjoy watching films and going to the opera, now only able to watch TV    Medications:  I have reviewed this patient's medication profile and medications from this hospitalization. Notable medications:  Sertraline 200mg daily  Dextrose infusion    Physical Exam   Vital Signs with Ranges  Temp:  [95  F (35  C)-98.2  F (36.8  C)] 98.2  F (36.8  C)  Pulse:  [59-90] 60  Resp:  [10-30] 11  BP: ()/(48-75) 88/51  Arterial Line BP: ()/(38-46) 116/38  SpO2:  [77 %-100 %] 100 %  147 lbs 14.86 oz    PHYSICAL EXAM:  General: laying in bed, obtunded  HEENT: very dry mucous membranes  Lungs: non-labored  Abdomen: nontender nondistended  Extremities: proximal muscle wasting  Neuro: obtunded, briefly opened eyes to voice    Data reviewed:  CTH 2/15  Impression:  1. No acute intracranial pathology.   2. Moderate generalized parenchymal volume loss and leukoaraiosis.   3. Right perirolandic chronic insult.  4. Right mastoid and middle ear cavity effusion.    CXR 2/15  IMPRESSION:  Unchanged perihilar and bibasilar streaky/patchy opacity. No  appreciable pneumothorax or acute pulmonary consolidations.    US renal 2/16  IMPRESSION:  1.  No hydronephrosis.  2.  Simple left renal cyst.     Medical Decision Making       MANAGEMENT DISCUSSED with the following over the past 24 hours: MICU   NOTE(S)/MEDICAL RECORDS REVIEWED over the past 24 hours: MICU, ID  Medical complexity over the past 24 hours:  - Decision regarding ESCALATION OF LEVEL OF CARE      Advance Care Planning Discussion 2/16/2024. IJessica PA-C met with Patient and their Health Care Agent(s) today at the hospital to discuss Advance Care Planning. Noe Florence does not have decisional capacity  and was present for this discussion.  Those present were informed of the voluntary nature of this discussion and wished to proceed.  The discussion included:  goals of care ,hospice, code status . This discussion began at 1240 and ended at 1340 for a  total of 60 minutes.

## 2024-02-16 NOTE — PROGRESS NOTES
Small Bowel Feeding Tube Placement Assessment  Reason for Feeding Tube Placement: feeding/ medications  Cortrak Start Time: 1640    Cortrak End Time: 1720  Medicine Delivered During Procedure: none  Placement Successful: no    Procedure Complications: small bore tube unable to pass cardiac sphincter and into the stomach.  Final Placement Juanito at exit of nare N/A  Face to Face time with patient: 40 minutes

## 2024-02-16 NOTE — SIGNIFICANT EVENT
SPIRITUAL HEALTH SERVICES Significant Event  [employer]  [facility] M10    Pt anointed by Father Heidy Hale   Pager [phone]

## 2024-02-16 NOTE — PROCEDURES
ARTERIAL LINE INSERTION PROCEDURE NOTE  (NON-OR)    Procedure Date:  2/16/2024   Performing Physician:  Christy Singh NP    Pre-Procedure Diagnosis:      Frequent lab draws to follow serial sodiums  Post-Procedure Diagnosis:  Same as Pre-Procedure Diagnosis    Procedure:  Arterial line insertion  Right Radial  Indications:   Severe hypernatremia requiring frequent lab draws       Estimated Blood Loss: Minimal   Complications: none      Procedure Details:   The risks, benefits, complications, treatment options, and expected outcomes were discussed with the patient. The risks and potential complications of their problem and purposed procedure include but are not limited to infection, bleeding, pain,  the need for additional procedures, and nerve and vessel injury. The patient/alternate (see above) concurred with the proposed plan, giving informed consent.  The site of the procedure was properly noted/marked. The patient was identified as Noe Florence with Date of Birth 1935 and the procedure verified as Arterial Line Insertion.  A Time Out was held and the above information confirmed.        An Gerald test was  done before the procedure.  The Gerald test results were  negative .  In sterile fashion, the line site was prepped with Chlorhexidine.  Strict sterile conditions were maintained,  Cap, mask, and sterile gloves were worn by all participants.  2 ml of   Lidocaine 1% without epinephrine anesthetic were infiltrated into the skin.  The arterial line was placed in the Right Radial artery percutaneously,  without  difficulty.  The linewas  sutured in place  and an occlusive sterile dressing was applied.  The total number of needle stick attempts was 2.      Condition: stable    Christy Singh NP, 2/16/2024, 5:20 AM

## 2024-02-16 NOTE — H&P
[facility] ADULT ICU H&P  02/16/2024     Date of Hospital Admission: 2/16/24  Date of ICU Admission: 2/16/24  Reason for Critical Care Admission: Close sodium monitoring while correcting profound hypernatremia   Date of Service (when I saw the patient): 02/16/2024    ASSESSMENT: Noe Florence is a 88 year old male with PMH notable for CKD3 (baseline Cr ~1.4-1.8), Afib, CAD with past CABG and stents, ischemic cardiomyopathy, polymorphic V-tach s/p ICD, monoclonal gammopathy of unknown significance, polyneuropathy, L common iliac aneurysm, polymyalgia rheumatica, past colon cancer, recent treatment for L 2nd toe osteomyelitis. Admitted to [facility] ICU for sepsis, AMS, ARSALAN, and profound hypernatremia likely in the setting of FTT.      PLAN:    Neuro:  # Multifactorial toxic metabolic encephalopathy 2/2 hypernatremia, ARSALAN with uremia, and sepsis  # Polyneuropathy  # Depression  # ELVIN  Recently admitted 12/8/23-12/21/23 for AMS and L 2nd toe osteomyelitis. Discharged and living at home. Was in his usual state of health until ~ 2/12 when he became progressively weaker, less responsive, and sleeping all day. On day of presentation (2/15) he was essentially unresponsive prompting family to bring him to the ED. Work-up in ED notable for Na+ 162, Cl- 130, CO2 18, , Cr 2.73, WBC 21.1, all cell lines up on CBC c/w dehydration. Head CT with no acute intracranial anomalies. On presentation, pt not responding verbally, but awake and alert, appears to be protecting airway.  Treat hypernatremia and infection  Seizure precautions  Neuro checks q2h  Acetaminophen PRN  Continue Zoloft 200 mg qPM on 2/16  Hold PTA Remeron 15 mg at bedtime, risperidone 0.5 mg AM and 1 mg PM, and baclofen 10 mg TID --> resume as mental status allows     Pulmonary:  # Acute hypoxia, resolved  Hypoxic when EMS picked him up. Titrated off supplemental O2 in ED. Protecting airway on ICU arrival. Spoke with wife, no reports of vomiting, but did struggle  "with swallowing in the days preceding admission.  CXR 2/15/24: Unchanged perihilar and bibasilar streaky/patchy opacities. No acute pulm consolidations   Supplemental O2 to keep SpO2 > 90%   IS q1h while awake    Cardiovascular:  # Troponemia, likely demand with clearance worsened by renal dysfunction  # Afib  # Ischemic cardiomyopathy  # Hypothermia  # CAD    # HTN  # Hx polymorphic V-tach s/p ICD placement   Troponin 140 --> 120, recheck on arrival and trend as indicated  EKG on ICU arrival   TTE in ED 2/15/24: Small LV cavity, EF 45-50%, mildly reduced RV & LV function, mitral annuloplasty ring in good position, est RAP normal (EF 55-60% on last ECHO in 2017)  Goal MAP > 65 mmHg  S/p 2L IVF boluses, bolus PRN  MIV @ 125 ml/hr  Hold PTA metoprolol tartrate 12.5 mg BID  Continue PTA warfarin evening of 2/16, goal INR 1.5-2.5 given frequent falls per last cards note   Nuvia hugger to maintain normothermia     GI/Nutrition:  # Anorexia  # Concern for dysphagia   # Risk for malnutrition, hypoalbuminemia   # Constipation   Per wife, in the days preceding admission pt was reporting trouble swallowing and was at time caught \"pocketing\" food.  No reports to vomiting or diarrhea. Last hospitalization discharged at 70.3 kg, currently 67.1 kgs.   RD consult appreciate recs    SLP consult appreciate recs  NPO   Need to discuss possible need for feeding tube with wife   Scheduled senna-docusate, miralax and supp PRN, last BM ~ 3-4 days ago    Renal/Fluids/Electrolytes:  # Profound hypernatremia, suspect chronic likely 2/2 dehydration and FTT  # ARSALAN on CKD3, prerenal in the setting of dehydration  # AGAMA, gap now closed, ? 2/2 Cl- exacerbated by AonC renal dsyfunction  # BPH  Presented with hypernatremia Na+ 164 and ARSALAN on CKD Cr 2.73. Would suspect hypernatremia has been present > 48 hours as symptoms of FTT developed slowly over ~ 3 days with limited po intake, suggesting chronic process. No sodium supplements on PTA med " list. Unclear if pt was using urea gel, but would be strange for a topical agent to precipitate this degree of anomaly. Profoundly dehydrated on exam on arrival to ED.  Check urine studies including Na+, osmo, Cr, and urea --> FENA indicates pre-renal injury, Uosmo 611   Repeat chemistry panel, serum osmo on ICU arrival, check ketones   Renal US to rule out obstruction  Na+ correction:   Goal serum Na+ 152 by 1300 2/16, no greater than 12 mEq/hr in 24 hrs  Sodium checks q2h   Free water deficit ~ 2.8L to correct to 152, 4.6L to correct to 145  Fluids in ED: 1L LR, 1L NS, NS @ 125 ml/hr  Continue volume expansion with NS @ 125 ml/hr, will consider more hypotonic fluids pending labs  Dominguez for strict I&O  Hold PTA urea gel and flomax   No electrolyte replacement protocols given ARSALAN and life-threatening electrolyte derangement, provider to replete PRN    Endocrine:  # Stress induced hyperglycemia  Goal -180  No current indication for insulin  Hypoglycemia protocol     ID:  # Sepsis, unclear source   # Leukocytosis 2/2 above   # GPCs in BC - contaminant vs true infection  # R middle ear effusion  # Numerous new pressure wounds  # Recent treatment for L 2nd toe osteomyelitis  # recent COVID-19 infection (12/10/23), now recovered   Recently treated from acute on chronic L 2nd toe osteomyelitis. Stopped abx on 1/31/24 after following up with ID. Presented 2/15 with WBCs 21.1 --> 14.1 following volume resuscitation, , ESR 68. Temp on arrival 95.0F. Current source unclear. L great toe wound without cellulitic appearance and is improved compared to prior photos. However, he now has numerous pressure wounds involving feet, hips, arms, and coccyx (photos in chart), but none with cellulitic appearance or overt drainage. CXR without acute infiltrate and hypoxia resolved, but aspiration remains on the differential. 2 of 2 BC in 1 of 2 cx thusfar growing GPCs, unclear at this point if contaminant vs true infection but  pt does have multiple portals of entry with new skin lesions. UA without overt signs of infection. Head CT notable for R middle ear effusion, pt did recently have COVID. Abd exam benign    CXR showed stable perihilar and streaky/patchy opacities without acute infiltrate  Head CT with new R middle ear effusion  Consider CT in AM if source remains unclear  ID consult  Relevant cultures:  12/10/23 COVID (+)  2/15/24 BC - GPCs in 2 of 2 bottles, 1 of 2 cultures   2/15 UA - few bacteria, no reflex  2/16 MRSA nares - in process  2/16 BC ordered for AM  Antibiotics:  Vancomycin (2/15 - )  Zosyn (2/15 - )         Hematology/Vascular:    # Chronic anemia, now with mild macrocytosis -->  suspect nutritional component  # Thrombocytopenia  # Hx DVT, on warfarin  # L common iliac artery aneurysm  # Hx MGUS  # drug induced coagulation defect   Hgb above baseline suggesting dehydration.  Hgb currently 9.3, , last hospitalization plt range 130-150.  No signs of active bleeding.  Send thiamine and folate levels, continue PTA B12 supplement  Continue PTA warfarin evening of 2/16  Goal INR 1.5-2.5 d/t frequent falls   INR at goal, defer additional acx   Patient to f/u with Vascular Surgery as outpatient.  Per outpatient Vascular Surgery note, possible repair if aneurysm > 4 cm.    # Hx of bilateral lower extremity edema 2/2 venous insufficiency  Generally wears compression stockings, but was instructed not to wear them following most recent hospitalization. Vascular surgery recommends he has some compression therapy, but agrees with no stockings while pt has an active foot wound  Hold off on lymphedema consult as pt is currently unable to communicate CMS     Musculoskeletal:  # Osteoporosis  # Chronic foot wounds with pain leading to intolerance of walking  # Frailty  # FTT  In regards to chronic management of the L toe wounds, recently evaluated by vascular surgery who felt his wounds were not ischemic and were rather pressure  related. Has also been seen by orthopedics who recommend medical management. No weight bearing restrictions, but has had trouble walk per his SO which has accelerated his deconditioning.   Hold PTA fosamax while inpt, continue calcium supplement  PT/OT consult    Skin:  # Chronic L 2nd toe wound  # New pressure wounds involving coccyx, R hip, R elbow, R ankle, and L heel  WOC consult   Photos in chart 2/16    General Cares/Prophylaxis:    DVT Prophylaxis: Warfarin  GI Prophylaxis: Not indicated  Restraints: Not indicated  Family Communication: So/[facility] Rica updated over the phone, plans on coming to the hospital today  Code Status: Full --> see discussion below    Discussed goals of care with Rica. She endorses Sha has not been doing well since he last left the hospital. They have a few hours of home care services a day, but otherwise Rica cares for Sha which has been becoming increasingly more difficult. She stated he's been more withdrawn lately and not able to walk much 2/2 not being able to put weight on his L foot, exacerbating his deconditioning. PTA in the ED, Rica was enquiring about hospice for Sha from advice from a friend who is a nurse. She would like to do home hospice but isn't sure if they qualify. Rica has also been thinking about continuing restorative cares and finding a long term care facility for Sha, pending affordability. With Rica's permission, palliative care and care management consults have been placed so Rica (and hopefully Sha if he perks up enough) can obtain information about disposition following hospitalization pending goals of care decisions. I recommended DNR/DNI for Sha. Rica would like to discuss this with Sha's son prior to formally changing code status.     Lines/tubes/drains:  PIV x2  Dominguez    Disposition:  [facility] Adult ICU    Christy Singh NP  Critical care time 60 minutes exclusive of procedures. Patient is critically ill with a  life-threatening electrolyte anomaly that requires close attention and titration of therapies to prevent devastating neurologic sequelae           -----------------------------------------------------------------------    HISTORY PRESENTING ILLNESS:    Noe Florence is a 88 year old male with PMH notable for CKD3 (baseline Cr ~1.4-1.8), Afib, CAD with past CABG and stents, ischemic cardiomyopathy, polymorphic V-tach s/p ICD, monoclonal gammopathy of unknown significance, polyneuropathy, L common iliac aneurysm, polymyalgia rheumatica, past colon cancer, recent treatment for L 2nd toe osteomyelitis. Admitted to [facility] ICU for sepsis, AMS, ARSALAN, and profound hypernatremia likely in the setting of FTT.    Presented with a 3-day history of worsening weakness, lethargy, and noted to be sleeping all day.  He was hypoxic with SpO2 81% when EMS picked him up and placed on 10 L.  Vitally stable on ED arrival and weaned to room air.  Labs since presentation notable for Na+ 164, Cl- 131, CR 2.73, AG 16, , troponin peaked at 140, VBG 7.2 6/46/21/20, WBC 21.1, Hgb 10.3, , platelets 136.  UA with few bacteria, but no other signs of infection hyaline cast present.  BC with 2 of 2 bottles and 1 of 2 cultures with GPC's, contaminant versus infection.  CXR showed stable perihilar and bibasilar streaky opacities, but no new infiltrate.  Head CT negative for acute intracranial anomaly but did show right middle ear effusion.  TTE showed EF 45-50% with mildly reduced LV and RV function.  EKG stable to baseline.  He was resuscitated with 2 L crystalloid and started on NS infusion at 125 cc/h.  Empiric vancomycin and Zosyn given.  Transferred to [facility] ICU for ongoing care.    On arrival to ICU patient was awake and alert, protecting airway but not verbally interactive.  VSS, but BP borderline hypotensive.  Repeat labs show improving Na+ 161, Osmo 375, UOsmo 611.  Physical exam revealing for multiple new pressure injuries,  however these do not appear cellulitic at this time, lungs CTA, urine pale yellow and clear.  Will continue volume resuscitation, trend sodiums, and continue empiric antibiotics with ID consult in AM.    REVIEW OF SYSTEMS: Unable 2/2 AMS     PAST MEDICAL HISTORY:   Past Medical History:   Diagnosis Date    Advanced open-angle glaucoma     Atrial fibrillation (H)     CKD (chronic kidney disease), stage III (H) 2005    Colon cancer (H)     Stage II-B colon cancer    Coronary artery disease     s/p CABG x 2, JEREMY x 2    Diverticulitis     Hyperlipidemia     Hypertension     Ischemic cardiomyopathy     MGUS (monoclonal gammopathy of unknown significance)     Nonsenile cataract     BE    Osteoporosis     left hip fracture    Polymorphic ventricular tachycardia (H)     Polymyalgia rheumatica (H24)     PVD (posterior vitreous detachment), both eyes 2005    S/P ablation of atrial flutter 6/20/14    CTI    Stented coronary artery     SVT (supraventricular tachycardia)     PPM/AICD for NSVT    Upper leg DVT (deep venous thromboembolism), chronic (H)     Left    Weight loss, unintentional 2017    15 lb in 4 months     SURGICAL HISTORY:  Past Surgical History:   Procedure Laterality Date    CATARACT IOL, RT/LT Bilateral     COLONOSCOPY  3/13/2014    Procedure: COMBINED COLONOSCOPY, SINGLE BIOPSY/POLYPECTOMY BY BIOPSY;;  Surgeon: Mary Gerber MD;  Location:  GI    COLONOSCOPY N/A 6/22/2015    Procedure: COLONOSCOPY;  Surgeon: Marilin Newman MD;  Location:  GI    COLONOSCOPY N/A 11/7/2018    Procedure: COMBINED COLONOSCOPY, SINGLE OR MULTIPLE BIOPSY/POLYPECTOMY BY BIOPSY;  Surgeon: Roberto Esteban MD;  Location:  GI    EP ICD GENERATOR REPLACEMENT DUAL N/A 12/11/2018    Procedure: EP ICD Generator Change Dual;  Surgeon: Deedee Baird MD;  Location:  HEART CARDIAC CATH LAB    H ABLATION ATRIAL FLUTTER      HEART CATH DRUG ELUTING STENT PLACEMENT  4/19/2012    JEREMY x 2 to LCx    IMPLANT IMPLANTABLE  CARDIOVERTER DEFIBRILLATOR  2012    ppm/aicd    KNEE SURGERY      right and left knee surgeries    LAPAROSCOPIC ASSISTED COLECTOMY Right 2019    Procedure: Laparoscopic Converted to Open Transverse Colectomy with Lysis of Adhesions;  Surgeon: Chanelle Guzmán MD;  Location: [facility] OR    LAPAROSCOPIC ASSISTED COLECTOMY LEFT (DESCENDING)  2014    Procedure: LAPAROSCOPIC ASSISTED COLECTOMY LEFT (DESCENDING);  Hand assisted Laparoscopic Sigmoid Colectomy , Laparoscopic mobilization of spleenic flexure *Latex Allergy*Anesthesia General with Block;  Surgeon: Chanelle Guzmán MD;  Location: [facility] OR    OPEN REDUCTION INTERNAL FIXATION RODDING INTRAMEDULLAR FEMUR FRACTURE TABLE Left 3/14/2019    Procedure: Open Reduction Internal Fixation Left Femur Intramedullar Nailing;  Surgeon: Srikanth Avalos MD;  Location: [facility] OR    REPAIR VALVE MITRAL  2006     30-mm Medtronic Julian ring     SELECTIVE LASER TRABECULOPLASTY (SLT) OD (RIGHT EYE)  4/10, ,+13    RE    SELECTIVE LASER TRABECULOPLASTY (SLT) OS (LEFT EYE)  2012    SHOULDER SURGERY      right rotator cuff    ZZC CABG, ARTERY-VEIN, FOUR      LIMA-LAD, SVG-Rt PDA, SVG-OM2, SVG-Diag 1    ZZC CABG, VEIN, SINGLE      1-vessel CABG, SVG->PDRCA      SOCIAL HISTORY:  Social History     Socioeconomic History    Marital status:    Tobacco Use    Smoking status: Former     Types: Cigarettes, Cigars     Quit date: 1968     Years since quittin.1    Smokeless tobacco: Never   Vaping Use    Vaping Use: Never used   Substance and Sexual Activity    Alcohol use: Not Currently    Drug use: No     Social Determinants of Health     Financial Resource Strain: Low Risk  (2/15/2024)    Financial Resource Strain     Within the past 12 months, have you or your family members you live with been unable to get utilities (heat, electricity) when it was really needed?: No   Food Insecurity: Low Risk  (2/15/2024)    Food Insecurity      Within the past 12 months, did you worry that your food would run out before you got money to buy more?: No     Within the past 12 months, did the food you bought just not last and you didn t have money to get more?: No   Transportation Needs: Low Risk  (2/15/2024)    Transportation Needs     Within the past 12 months, has lack of transportation kept you from medical appointments, getting your medicines, non-medical meetings or appointments, work, or from getting things that you need?: No   Housing Stability: Low Risk  (2/15/2024)    Housing Stability     Do you have housing? : Yes     Are you worried about losing your housing?: No     FAMILY HISTORY:   Family History   Problem Relation Age of Onset    C.A.D. Father     Anesthesia Reaction No family hx of     Crohn's Disease No family hx of     Ulcerative Colitis No family hx of     Cancer - colorectal No family hx of     Macular Degeneration No family hx of     Cancer No family hx of         No known family hx of skin cancer    Melanoma No family hx of     Skin Cancer No family hx of      ALLERGIES:   Allergies   Allergen Reactions    Latex Rash     Rash     MEDICATIONS:  No current facility-administered medications on file prior to encounter.  acetaminophen (TYLENOL) 500 MG tablet, Take 1,000 mg by mouth 2 times daily And every day PRN  alendronate (FOSAMAX) 70 MG tablet, Take 1 tablet (70 mg) by mouth every 7 days Take with a full glass of water and do not eat or lay down for 30 minutes  amoxicillin-clavulanate (AUGMENTIN) 500-125 MG tablet, Take 1 tablet by mouth 2 times daily  bacitracin 500 UNIT/GM ophthalmic ointment, 1 application, topical, Once A Day, Apply to open areas on right great to and right second toe and cover with dressing daily (has pressure area to toes) (Patient not taking: Reported on 1/5/2024)  baclofen (LIORESAL) 10 MG tablet, Take 1 tablet (10 mg) by mouth 3 times daily (Patient not taking: Reported on 1/31/2024)  calcium carbonate 500 mg,  elemental, (OSCAL;OYSTER SHELL CALCIUM) 500 MG tablet, Take 1 tablet (500 mg) by mouth 2 times daily  cholecalciferol 1000 units TABS, Take 1,000 Units by mouth 2 times daily  COMPRESSION STOCKINGS, 1 each daily  cyanocobalamin (VITAMIN B-12) 1000 MCG tablet, Take 4 daily (Patient taking differently: Take 2,000 mcg by mouth 2 times daily Take 4 daily)  diclofenac (VOLTAREN) 1 % topical gel, Apply 2 g topically 4 times daily as needed  doxycycline hyclate (VIBRAMYCIN) 100 MG capsule, Take 1 capsule (100 mg) by mouth every 12 hours  Emollient (CERAVE SA RENEWING) LOTN, 1 application, topical, Once A Day, Apply to torso  Emollient (CERAVE) CREA, 1 application, topical, Once A Day, Apply to bilateral legs  ferrous sulfate (FE TABS) 325 (65 Fe) MG EC tablet, Take 325 mg by mouth daily  ketoconazole (NIZORAL) 2 % external cream, Apply topically 2 times daily  ketoconazole (NIZORAL) 2 % external cream, Apply to the feet 2 times a day until rash clears then 3-4 times a week for maintenance  ketoconazole (NIZORAL) 2 % external shampoo, Apply to scalp daily as needed until flaking resolves (Patient not taking: Reported on 1/31/2024)  lactobacillus rhamnosus, GG, (CULTURELL) capsule, Take 1 capsule by mouth 2 times daily  metoprolol tartrate (LOPRESSOR) 25 MG tablet, Take 0.5 tablets (12.5 mg) by mouth 2 times daily  mirtazapine (REMERON) 15 MG tablet, Take 15 mg by mouth At Bedtime.  Misc. Devices (PILL ) MISC, 1 each as needed (medication administration)  Multiple Vitamins-Minerals (MULTIVITAMIN ADULT PO), Take 1 tablet by mouth every morning  polyethylene glycol (MIRALAX) 17 GM/Dose powder, Take 17 g by mouth daily (Patient not taking: Reported on 1/31/2024)  risperiDONE (RISPERDAL) 0.5 MG tablet, Take by mouth daily 0.5 mg QAM, 1 mg QHS  sennosides (SENOKOT) 8.6 MG tablet, Take 1 tablet by mouth 2 times daily  sertraline (ZOLOFT) 100 MG tablet, Take 200 mg by mouth every evening  tamsulosin (FLOMAX) 0.4 MG capsule,  Take 2 capsules (0.8 mg) by mouth daily  urea (GORMEL) 20 % external cream, Apply topically 2 times daily Apply to bilateral heels BID  warfarin ANTICOAGULANT (JANTOVEN ANTICOAGULANT) 5 MG tablet, TAKE ONE TABLET ( 5 MG ) SUNDAY, WEDNESDAY, & FRIDAY AND ONE-HALF TABLET ( 2.5 MG ) ALL OTHER DAYS  OR AS DIRECTED BY ACC TEAM        PHYSICAL EXAMINATION:  Temp:  [98.1  F (36.7  C)] 98.1  F (36.7  C)  Pulse:  [] 60  Resp:  [10-26] 10  BP: ()/(56-75) 115/59  SpO2:  [97 %-100 %] 100 %  General: Frail, chronically ill appearing, laying in bed in NAD  HEENT: Normocephalic, atraumatic, oral mucosa and lips dry, neck with mild kyphotic flexture  Neuro: ABELARDO orientation, pt not speaking, pupils 2mm, blinks to threat and tracks, moves all extremities, unable to assess strength however  Pulm/Resp: Clear breath sounds bilaterally without rhonchi, crackles or wheeze, breathing non-labored  CV: RRR, no RMG, extremities warm, pulses 2+, 1+ pitting edema extending to the hips and also involving the hands   Abdomen: Soft, non-distended, non-tender  : mo catheter in place, urine yellow and clear  Incisions/Skin: Chronic flat brown plaques involving abd and thoracic cage. Pressure sores noted to the L 1-3 toes, coccyx, R hip, R elbow, R ankle, and L heel. No surrounding erythema, exudates, or fluctuance appreciated     LABS: Reviewed.   Arterial Blood Gases   No lab results found in last 7 days.  Venous Blood Gas  Recent Labs   Lab 02/16/24 0119   PHV 7.26*   PCO2V 46   PO2V 21*   HCO3V 20*   UZIEL -6.3*   O2PER 21       Complete Blood Count   Recent Labs   Lab 02/16/24  0119 02/15/24  1250   WBC 14.1* 21.1*   HGB 9.3* 10.3*   * 171     Basic Metabolic Panel  Recent Labs   Lab 02/16/24  0119 02/16/24  0057 02/15/24  2107 02/15/24  1815 02/15/24  1618 02/15/24  1250   *  --  162* 162* 162* 164*   POTASSIUM 3.6  --   --   --  4.0 4.5   CHLORIDE 131*  --   --   --  128* 130*   CO2 18*  --   --   --  20* 18*   BUN  95.1*  --   --   --  104.0* 107.0*   CR 2.39*  --   --   --  2.65* 2.73*   * 120*  --   --  144* 140*     Liver Function Tests  Recent Labs   Lab 02/16/24  0119 02/15/24  1250 02/15/24  1232 02/14/24  0000   AST 35 28  --   --    ALT 18 15  --   --    ALKPHOS 52 57  --   --    BILITOTAL 0.4 0.3  --   --    ALBUMIN 2.9* 3.3*  --   --    INR  --   --  1.87* 1.7*     Coagulation Profile  Recent Labs   Lab 02/15/24  1232 02/14/24  0000   INR 1.87* 1.7*       IMAGING:  Recent Results (from the past 24 hour(s))   XR Chest Port 1 View    Narrative    XR CHEST PORT 1 VIEW  2/15/2024 1:15 PM     HISTORY:  dyspnea       COMPARISON:  12/11/2023 CXR    TECHNIQUE: Portable, semiupright, frontal projection radiograph of the  chest.    FINDINGS:   Implantable cardiac defibrillator projects over the left upper chest  with stable positioning of right atrial lead and right ventricular  shock coil. Mitral valve prosthesis. Postsurgical changes of the chest  with median sternotomy wires intact.    Cardiac mediastinal silhouette is stable. The trachea is midline. No  appreciable pneumothorax. Streaky opacities of the lower lungs and  perihilar regions. No focal pulmonary consolidations.     Visualized upper abdomen is unremarkable. No acute osseous  abnormalities.      Impression    IMPRESSION:  Unchanged perihilar and bibasilar streaky/patchy opacity. No  appreciable pneumothorax or acute pulmonary consolidations.    I have personally reviewed the examination and initial interpretation  and I agree with the findings.    AFIA DANIELS MD         SYSTEM ID:  [id number]   Head CT w/o contrast    Narrative    CT HEAD W/O CONTRAST 2/15/2024 2:11 PM    History: AMS     Comparison: Head CT from 9/11/2016.    Technique: Using multidetector thin collimation helical acquisition  technique, axial, coronal and sagittal CT images from the skull base  to the vertex were obtained without intravenous contrast.   (topogram) image(s) also  obtained and reviewed.    Findings:   Images are degraded aided by motion artifact.  There is no intracranial hemorrhage, mass effect, or midline shift.  Gray/white matter differentiation in both cerebral hemispheres is  preserved. Ventricles are proportionate to the cerebral sulci.  Moderate generalized parenchymal volume loss. White matter hyper  attenuation, most likely represents chronic small vessel ischemic  disease. The right perirolandic chronic insult (series 3 image 16).  The basal cisterns are clear. Arterial consultations.    The bony calvaria and the bones of the skull base are normal. The  visualized portions of the paranasal sinuses are clear. Right mastoid  and middle ear cavity effusion. Bilateral pseudophakia.      Impression    Impression:  1. No acute intracranial pathology.   2. Moderate generalized parenchymal volume loss and leukoaraiosis.   3. Right perirolandic chronic insult.  4. Right mastoid and middle ear cavity effusion.    THOMAS MCHUGH MD         SYSTEM ID:  [id number]   Echo Complete   Result Value    LVEF  45-50% (mildly reduced)    Narrative    [id number]  [id number]  [id number]  [id number]^JULIO CESAR^JENNIFER^THOMAS     [facility],[place]  Echocardiography Laboratory  [address] [place] [id number]     Name: LEROY MACHADO  MRN: [id number]  : 1935  Study Date: 02/15/2024 02:55 PM  Age: 88 yrs  Gender: Male  Patient Location: [facility]  Reason For Study: Acute Myocardial Infarction  Ordering Physician: JENNIFER HARRELL  Performed By: Jessica Cedeno     BSA: 1.8 m2  Height: 67 in  Weight: 155 lb  HR: 62  BP: 126/80 mmHg  ______________________________________________________________________________  Procedure  Complete Portable Echo Adult. Contrast Optison. Optison (NDC #4830-8359-48)  given intravenously. Patient was given 6 ml mixture of 3 ml Optison and 6 ml  saline. 3 ml  wasted.  ______________________________________________________________________________  Interpretation Summary  Left ventricular cavity size is small. Left ventricular function is decreased.  The ejection fraction is 45-50% (mildly reduced).  Global right ventricular function is mildly reduced.  An annuloplasty ring is noted in the mitral position. There i no stenosis or  regurgitation.  Estimated mean right atrial pressure is normal.  No pericardial effusion is present.     ______________________________________________________________________________  Left Ventricle  Left ventricular cavity size is small. Mild to moderate concentric wall  thickening consistent with left ventricular hypertrophy is present. Left  ventricular function is decreased. The ejection fraction is 45-50% (mildly  reduced).     Right Ventricle  The right ventricle is normal size. Global right ventricular function is  mildly reduced. A pacemaker lead is noted in the right ventricle.     Atria  The right atria appears normal. Mild left atrial enlargement is present.     Mitral Valve  An annuloplasty ring is noted in the mitral position. The mean gradient across  the mitral valve is 2 mmHg.     Aortic Valve  The aortic valve is tricuspid. Mild aortic valve calcification is present.     Tricuspid Valve  Mild tricuspid insufficiency is present. The right ventricular systolic  pressure is approximated at 23.0 mmHg plus the right atrial pressure.  Pulmonary artery systolic pressure is normal.     Pulmonic Valve  On Doppler interrogation, there is no significant stenosis or regurgitation.     Vessels  The aorta root is normal. The inferior vena cava was normal in size with  preserved respiratory variability. Estimated mean right atrial pressure is  normal.     Pericardium  No pericardial effusion is present.     ______________________________________________________________________________  MMode/2D Measurements & Calculations  IVSd: 1.4 cm  LVIDd:  4.1 cm  LVIDs: 2.8 cm  LVPWd: 1.3 cm  FS: 31.7 %     LV mass(C)d: 203.9 grams  LV mass(C)dI: 112.4 grams/m2  Ao root diam: 3.4 cm  asc Aorta Diam: 3.1 cm  LVOT diam: 2.3 cm  LVOT area: 4.1 cm2  Ao root diam index Ht(cm/m): 2.0  Ao root diam index BSA (cm/m2): 1.9  Asc Ao diam index BSA (cm/m2): 1.7  Asc Ao diam index Ht(cm/m): 1.8  LA Volume (BP): 71.8 ml  LA Volume Index (BP): 39.7 ml/m2     RWT: 0.61     Doppler Measurements & Calculations  MV E max gabe: 69.7 cm/sec  MV A max gabe: 111.1 cm/sec  MV E/A: 0.63  MV dec slope: 253.8 cm/sec2  MV dec time: 0.27 sec  Ao V2 max: 111.0 cm/sec  Ao max P.9 mmHg  Ao V2 mean: 61.0 cm/sec  Ao mean P.7 mmHg  Ao V2 VTI: 18.0 cm  MARIA VICTORIA(I,D): 4.5 cm2  MARIA VICTORIA(V,D): 3.6 cm2  LV V1 max PG: 3.8 mmHg  LV V1 max: 97.5 cm/sec  LV V1 VTI: 19.6 cm  SV(LVOT): 80.6 ml  SI(LVOT): 44.4 ml/m2  PA acc time: 0.09 sec  TR max gabe: 240.0 cm/sec  TR max P.0 mmHg  AV Gabe Ratio (DI): 0.88  MARIA VICTORIA Index (cm2/m2): 2.5  E/E' avg: 10.0  Lateral E/e': 9.4  Medial E/e': 10.7     ______________________________________________________________________________  Report approved by: Evelio Delaney 02/15/2024 05:24 PM

## 2024-02-16 NOTE — ED NOTES
I took signout on the patient from Dr. Serna.  This is a 88-year-old male with ambulance.  He was noted to have an ARSALAN with considerably high sodium.  This is 162.  Patient also had an elevated troponin, now downtrending.  Initial plan was to admit to ICU.  Discussed with placement, there is no ICU beds available at [facility].  I discussed this with family who declined states that they cannot travel to further facilities.  [facility] [place] [facility]     Attempted to place R internal jugular for access. Unable to obtain internal jugular access. Patient unable to turn head to right to evaluate L internal jugular.     Family updated on progress and plan [place] [facility]     Critical Care time was 30 minutes for this patient excluding procedures.           Keon Franklin,   02/15/24 [id number]

## 2024-02-16 NOTE — CONSULTS
Care Management Initial Consult    General Information  Assessment completed with: Spouse or significant other, Friend,    Type of CM/SW Visit: Initial Assessment    Primary Care Provider verified and updated as needed: Yes   Readmission within the last 30 days: no previous admission in last 30 days      Reason for Consult: discharge planning  Advance Care Planning: Advance Care Planning Reviewed: present on chart, verified with patient          Communication Assessment  Patient's communication style: spoken language (English or Bilingual)             Cognitive  Cognitive/Neuro/Behavioral: .WDL except, level of consciousness  Level of Consciousness: lethargic  Arousal Level: opens eyes spontaneously  Orientation: other (see comments) (ABELARDO)  Mood/Behavior: flat affect, calm, withdrawn  Best Language: 2 - Severe aphasia  Speech: incoherent    Living Environment:   People in home: spouse     Current living Arrangements: house      Able to return to prior arrangements: no       Family/Social Support:  Care provided by: spouse/significant other, homecare agency  Provides care for: no one, unable/limited ability to care for self  Marital Status:   Wife          Description of Support System: Involved, Supportive    Support Assessment: Adequate family and caregiver support, Adequate social supports    Current Resources:   Patient receiving home care services: Yes     Community Resources:    Equipment currently used at home: other (see comments) (Pt is currently total cares with likely progression towards Hospice)  Supplies currently used at home:      Employment/Financial:  Employment Status: retired        Financial Concerns: none   Referral to Financial Worker: No       Does the patient's insurance plan have a 3 day qualifying hospital stay waiver?  No    Lifestyle & Psychosocial Needs:  Social Determinants of Health     Food Insecurity: Low Risk  (2/15/2024)    Food Insecurity     Within the past 12 months, did  you worry that your food would run out before you got money to buy more?: No     Within the past 12 months, did the food you bought just not last and you didn t have money to get more?: No   Depression: At risk (1/31/2024)    PHQ-2     PHQ-2 Score: 4   Housing Stability: Low Risk  (2/15/2024)    Housing Stability     Do you have housing? : Yes     Are you worried about losing your housing?: No   Tobacco Use: Medium Risk (2/16/2024)    Patient History     Smoking Tobacco Use: Former     Smokeless Tobacco Use: Never     Passive Exposure: Not on file   Financial Resource Strain: Low Risk  (2/15/2024)    Financial Resource Strain     Within the past 12 months, have you or your family members you live with been unable to get utilities (heat, electricity) when it was really needed?: No   Alcohol Use: Not on file   Transportation Needs: Low Risk  (2/15/2024)    Transportation Needs     Within the past 12 months, has lack of transportation kept you from medical appointments, getting your medicines, non-medical meetings or appointments, work, or from getting things that you need?: No   Physical Activity: Not on file   Interpersonal Safety: Not on file   Stress: Not on file   Social Connections: Not on file       Functional Status:  Prior to admission patient needed assistance:   Dependent ADLs:: Bathing, Dressing, Eating, Positioning, Transfers, Toileting  Dependent IADLs:: Cleaning, Cooking, Laundry, Shopping, Meal Preparation, Medication Management, Money Management, Transportation       Mental Health Status:  Mental Health Status: Other (see comment) (Pt is near total cares,  sleeping much of time and not very alert.)       Chemical Dependency Status:  Chemical Dependency Status: No Current Concerns             Values/Beliefs:  Spiritual, Cultural Beliefs, Hindu Practices, Values that affect care: yes (Pt is Roman Catholic)  Description of Beliefs that Will Affect Care: Pt is Roman Catholic. Family requesting anoiting of  the  sick visit from .    Cultural/Adventism Practices Patient Routinely Participates In: prayer       Additional Information:  SW met with Pt's  partner(spouse) and long time friend at bedside. Pt is requiring near total cares at this time. Pt's spouse does not believe she can care for him at home with home hospice and is in favor of [facility]. SW called the intake/referral worker at [facility] and she has left for the day/weekend and will return Monday morning at which time SW will attempt to make a new referral. Pt is [employer]al and received anointing of the sick this afternoon from [facility] .  SW will continue to follow for safe discharge placement and planning.         Samuel Joy, [facility] CenterSW  [facility]   Ph: [phone]  Pager: [phone]

## 2024-02-16 NOTE — CONSULTS
[facility] [place] GENERAL INFECTIOUS DISEASES CONSULTATION     Patient:  Noe Florence   Date of birth 1935, Medical record number [id number]  Date of Visit:  02/16/2024  Date of Admission: 2/15/2024  Consult Requester:Phu Mendoza*          Assessment and Recommendations:   ID Problem List  Positive blood culture - MRSE by Verigene, +2/15 in 1/2 sets  Sepsis of unclear etiology (tachycardia, tachypnea, leukocytosis)  Altered mental status - secondary to hypernatremia, possible infection, other  Multiple new pressure wounds - see WOC note 2/16/24  ARSALAN on CKD  Dysphagia  Elevated inflammatory markers  History of left 2nd toe osteomyelitis, s/p 7.5 weeks Abx (IV vanc/zosyn --> PO doxy + Augmentin with EOT 1/31/24)  COVID-19 infection s/p remdesivir (12/2023), recovered  Hardware in place: ICD, MVR, left hip nails (2019)  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 (09/2020, health [employer])    RECOMMENDATION:  Stop IV Zosyn  Start IV ceftriaxone 2g daily  Continue IV vancomycin - pharmacy to dose  Please repeat blood cultures x2 today  Follow up pending blood cultures/sensitivities  Consider abdominal imaging per primary team, given possible localizing RLQ pain on palpation  Continue aspiration precautions and wound cares    ASSESSMENT:  Noe Florence is a 88 year old male with PMHx significant for CAD, atrial fibrillation, VT s/p ICD, MVR, polymyalgia rheumatica, CKD, DVT on warfarin, T2DM, MGUS with neuropathy, left hip fracture (2019, s/p IM nailing), history of colon cancer s/p resection, and recent L 2nd toe OM who was admitted to ICU on 2/15/24 with 3 days of poor PO intake, weakness, AMS, and failure to thrive. Found dehydrated with hypernatremia to 160s, leukocytosis, ARSALAN on CKD, and multiple new pressure ulcers. ID consulted for new leukocytosis in setting of FTT and recently completed treatment for osteomyelitis of left 2nd toe.     He met sepsis criteria on admission with elevated HR, RR, and leukocytosis  with soft BP and initial hypoxia noted with EMS of unclear etiology. Not sure what his baseline mental status is, as upon chart review he was minimally conversive at prior visits. He does respond to verbal and physical stimuli, opens eyes, tracks. No meningismus on exam and CT head without acute concerns. Overall, favor AMS due to hypernatremia in setting of poor PO intake at this time. Urine noninfectious and viral workup to date unremarkable. Elevated procalcitonin likely affected by ARSALAN on CKD, unreliable marker in this setting though he does have elevated inflammatory markers. Has +blood culture with MRSE on Verigene in 1/2 sets, possible contaminant vs true bacteremia. He has multiple potential skin sources concerning for possible true bacteremia and notably has MVR, ICD, and left hip hardware. In regards to his recent OM, he has completed prior antibiotic course for OM, no further treatment required for this. Left 2nd toe is well appearing per last ID visit 1/31/24 and on review of WOC photo today is stable with no significant erythema, swelling, or drainage noted. With reported history of dysphagia, remains at risk for aspiration. CXR without focal consolidation, brief hypoxia now resolved. Possible he had aspiration event with brief pneumonitis, no current concern for aspiration pneumonia with stability on room air, no reported cough.     Agree with empiric antibiotics for now. Please continue vancomycin for MRSE BCx and history of MRSA. Stop Zosyn in setting of ARSALAN on CKD, narrow to IV ceftriaxone 2g daily for empiric GN coverage as no Pseudomonas history. Please repeat blood cultures x2 today. Consider further imaging of abdomen given pain on exam, however he cries out generally and it is difficult to locate specific pain source for him.    Thank you for this consult. ID will continue to follow.   Patient was discussed with Dr. Lora. Recommendations discussed with primary team.    Tiffani Henderson  ELI  Infectious Diseases  Pager #[phone] or Vocera    80 MINUTES SPENT BY ME on the date of service doing chart review, history, exam, documentation & further activities per the note.           History of Present Illness:     Noe Florence is a 88 year old male with past medical history significant for CAD, atrial fibrillation, VT s/p ICD, MVR, polymyalgia rheumatica, CKD, DVT on warfarin, T2DM, MGUS with neuropathy,  left hip fracture (2019, s/p IM nailing), history of colon cancer s/p resection, and recent L 2nd toe OM who was admitted to ICU on 2/15 with 3 days of poor PO intake, weakness, AMS who was found with hypernatremia, failure to thrive, leukocytosis, and ARSALAN. ID consulted for new leukocytosis in setting of FTT, recently treated osteomyelitis of left 2nd toe.     Reported with progressive weakness in the three days prior to admission, with poor PO intake, lethargy, worsening dysphagia. Has been unable to get out of bed alone. EMS was called - notable for hypoxia to 81%, tachycardia, systolic BP in 90s. No fever, chills, URI sx, nausea, vomiting, diarrhea. In ED, was hemodynamically stable, afebrile, weaned to room air. He was found to have multiple new pressure ulcers on R side of body. Labs notable for concentrated CBC, WBC 21.1 --> 13.4, hemoglobin 10.3 --> 7.5, elevated CRP (102), ESR 68, normal lactic acid. Procalcitonin 0.73. Was found with hypernatremia to 164, Creatinine 2.73, , glucose 140. UA noninfectious appearing, notable for hyaline casts. EKG notable for Afib. TTE with EF 45-50%, mildly reduced from prior. CT head with right mastoid, middle ear effusion with no acute intracranial abnormalities. CXR with patchy opacities, no focal consolidations. Flu/covid/RSV negative. Renal US without hydronephrosis, +simple cyst. Blood cultures with Staph epidermidis in 1/2 sets from 2/15. Started on IV vancomycin/zosyn. Arterial line placed. Prior notes report Bill has been minimally conversive in  the past at visits so unclear what his true baseline mental status is. No family at bedside on my visit today.     Prior admission 12/2023 for COVID and left 2nd toe infection did not improve with outpatient amoxicillin. Was treated with IV vancomycin + Zosyn while inpatient, changed to PO doxycycline + Augmentin on discharge given prior culture with MRSA. He completed 7.5 weeks of antibiotics on 1/31/24 and were discontinued at ID clinic appt. Toe was well appearing at that time with robust granulation tissue.          Review of Systems:   Unable to assess due to AMS         Past Medical History:     Past Medical History:   Diagnosis Date    Advanced open-angle glaucoma     Atrial fibrillation (H)     CKD (chronic kidney disease), stage III (H) 2005    Colon cancer (H)     Stage II-B colon cancer    Coronary artery disease     s/p CABG x 2, JEREMY x 2    Diverticulitis     Hyperlipidemia     Hypertension     Ischemic cardiomyopathy     MGUS (monoclonal gammopathy of unknown significance)     Nonsenile cataract     BE    Osteoporosis     left hip fracture    Polymorphic ventricular tachycardia (H)     Polymyalgia rheumatica (H24)     PVD (posterior vitreous detachment), both eyes 2005    S/P ablation of atrial flutter 6/20/14    CTI    Stented coronary artery     SVT (supraventricular tachycardia)     PPM/AICD for NSVT    Upper leg DVT (deep venous thromboembolism), chronic (H)     Left    Weight loss, unintentional 2017    15 lb in 4 months            Past Surgical History:     Past Surgical History:   Procedure Laterality Date    CATARACT IOL, RT/LT Bilateral     COLONOSCOPY  3/13/2014    Procedure: COMBINED COLONOSCOPY, SINGLE BIOPSY/POLYPECTOMY BY BIOPSY;;  Surgeon: Mary Gerber MD;  Location: [facility]    COLONOSCOPY N/A 6/22/2015    Procedure: COLONOSCOPY;  Surgeon: Marilin Newman MD;  Location: [facility]    COLONOSCOPY N/A 11/7/2018    Procedure: COMBINED COLONOSCOPY, SINGLE OR MULTIPLE  BIOPSY/POLYPECTOMY BY BIOPSY;  Surgeon: Roberto Esteban MD;  Location:  GI    EP ICD GENERATOR REPLACEMENT DUAL N/A 12/11/2018    Procedure: EP ICD Generator Change Dual;  Surgeon: Deedee Baird MD;  Location:  HEART CARDIAC CATH LAB    H ABLATION ATRIAL FLUTTER      HEART CATH DRUG ELUTING STENT PLACEMENT  4/19/2012    JEREMY x 2 to LCx    IMPLANT IMPLANTABLE CARDIOVERTER DEFIBRILLATOR  5-    ppm/aicd    INSERT ARTERIAL LINE  2/16/2024    KNEE SURGERY      right and left knee surgeries    LAPAROSCOPIC ASSISTED COLECTOMY Right 2/1/2019    Procedure: Laparoscopic Converted to Open Transverse Colectomy with Lysis of Adhesions;  Surgeon: Chanelle Guzmán MD;  Location:  OR    LAPAROSCOPIC ASSISTED COLECTOMY LEFT (DESCENDING)  4/8/2014    Procedure: LAPAROSCOPIC ASSISTED COLECTOMY LEFT (DESCENDING);  Hand assisted Laparoscopic Sigmoid Colectomy , Laparoscopic mobilization of spleenic flexure *Latex Allergy*Anesthesia General with Block;  Surgeon: Chanelle Guzmán MD;  Location:  OR    OPEN REDUCTION INTERNAL FIXATION RODDING INTRAMEDULLAR FEMUR FRACTURE TABLE Left 3/14/2019    Procedure: Open Reduction Internal Fixation Left Femur Intramedullar Nailing;  Surgeon: Srikanth Avalos MD;  Location:  OR    REPAIR VALVE MITRAL  2006     30-mm [employer]onic Julian ring     SELECTIVE LASER TRABECULOPLASTY (SLT) OD (RIGHT EYE)  4/10, 1/12,+1/9/13    RE    SELECTIVE LASER TRABECULOPLASTY (SLT) OS (LEFT EYE)  5/2012    SHOULDER SURGERY      right rotator cuff    ZZC CABG, ARTERY-VEIN, FOUR  2006    LIMA-LAD, SVG-Rt PDA, SVG-OM2, SVG-Diag 1    Z CABG, VEIN, SINGLE  1994    1-vessel CABG, SVG->PDRCA             Family History:     Family History   Problem Relation Age of Onset    C.A.D. Father     Anesthesia Reaction No family hx of     Crohn's Disease No family hx of     Ulcerative Colitis No family hx of     Cancer - colorectal No family hx of     Macular Degeneration No family hx of  "    Cancer No family hx of         No known family hx of skin cancer    Melanoma No family hx of     Skin Cancer No family hx of             Social History:     Social History     Tobacco Use    Smoking status: Former     Types: Cigarettes, Cigars     Quit date: 1968     Years since quittin.1    Smokeless tobacco: Never   Substance Use Topics    Alcohol use: Not Currently     History   Sexual Activity    Sexual activity: Not on file            Current Medications:      calcium carbonate 500 mg (elemental)  500 mg Oral BID    cyanocobalamin  2,000 mcg Oral BID    piperacillin-tazobactam  2.25 g Intravenous Q6H    senna-docusate  1 tablet Oral BID    Or    senna-docusate  2 tablet Oral BID    sertraline  200 mg Oral QPM    vancomycin place vega - receiving intermittent dosing  1 each Intravenous See Admin Instructions    Vitamin D3  25 mcg Oral BID    warfarin ANTICOAGULANT  2.5 mg Oral ONCE at 18:00    Warfarin Therapy Reminder  1 each Oral See Admin Instructions            Allergies:     Allergies   Allergen Reactions    Latex Rash     Rash            Physical Exam:   Vitals were reviewed  Patient Vitals for the past 24 hrs:   BP Temp Temp src Pulse Resp SpO2 Height Weight   24 0800 94/51 98.2  F (36.8  C) Axillary 80 28 96 % -- --   24 0700 111/58 -- -- 83 25 96 % -- --   24 0600 100/51 -- -- 76 27 96 % -- --   24 0500 99/56 -- -- 67 15 97 % -- --   24 0400 99/51 97.1  F (36.2  C) Axillary 60 15 95 % -- --   24 0300 (!)  -- -- 60 20 95 % -- --   24 0200 9455 -- -- 64 11 93 % -- --   24 0100 133/70 (!) 95  F (35  C) Axillary 74 16 97 % 1.702 m (5' 7\") 67.1 kg (147 lb 14.9 oz)   24 0001 (!)  -- -- 59 11 98 % -- --   02/15/24 2327 -- -- -- 59 10 98 % -- --   02/15/24 2322 -- -- -- 59 11 98 % -- --   02/15/24 2318 (!)  -- -- 59 10 97 % -- --   02/15/24 2317 (!)  -- -- 59 11 98 % -- --   02/15/24 2258 90/50 -- -- 61 13 99 % -- -- "   02/15/24 2249 115/59 -- -- 60 10 100 % -- --   02/15/24 2001 101/60 -- -- 61 15 99 % -- --   02/15/24 1904 (!) 89/56 -- -- 67 17 98 % -- --   02/15/24 1844 122/75 -- -- 86 18 100 % -- --   02/15/24 1757 -- -- -- -- 13 -- -- --   02/15/24 1747 121/71 -- -- 79 -- 100 % -- --   02/15/24 1737 -- -- -- -- 26 -- -- --   02/15/24 1703 101/58 -- -- 75 19 97 % -- --   02/15/24 1517 129/71 -- -- 95 25 99 % -- --   02/15/24 1307 -- 98.1  F (36.7  C) Oral -- -- -- -- --   02/15/24 1245 106/75 -- -- 100 25 97 % -- --   02/15/24 1239 115/74 -- -- 99 22 100 % -- --       Physical Examination:  Constitutional: Elderly adult male seen sitting up in bed, in NAD. Awakens to voice, light physical touch and opens eyes + tracks. Does not verbalize, but groans/cries out in discomfort  HEENT: NCAT, anicteric sclerae, conjunctiva clear. Dry mucous membranes without lesions or thrush.  Respiratory: Non-labored breathing, good air exchange on room air. Lungs are clear to auscultation bilaterally, without wheezing, crackles or rhonchi. No cough noted.   Cardiovascular: Regular rate, irregular rhythm, +mechanical valve.  GI: Normoactive BS. Abdomen is soft, nontender to percussion, tender to palpation in RLQ to best of my ability to localize given his non-verbal status, non-distended. No rigidity or guarding. No rebound tenderness.  Skin: Warm and dry. No new rashes. Multiple pressure ulcers on bony prominences - see [facility] note 2/16 for details. Wounds covered with dressings.  Musculoskeletal: Extremities grossly normal, thin. No tenderness or edema present.   Neurologic: Unable to assess orientation  VAD: PIV, arterial line is c/d/i with no erythema, drainage, or tenderness. Skin tear to R forearm/wrist near PIV         Laboratory Data:     Inflammatory Markers    Recent Labs   Lab Test 02/15/24  1250 12/08/23  1311 09/26/23  1433 11/03/21  1618 07/26/19  1612 01/15/18  1224 11/04/17  1226 01/25/17  1427 11/16/16  1815   SED 68* 62* 69* 42*  43* 43*  --  80* 84*   CRP  --   --   --   --   --  <2.9 3.4 <2.9 31.8*       Hematology Studies    Recent Labs   Lab Test 02/16/24 0526 02/16/24  0119 02/15/24  1250 12/14/23  0557 12/13/23  0713 12/12/23  0828 05/12/21  1127 02/15/21  1546 11/23/20  1637 08/21/20  1642 02/21/20  1201 11/18/19  1041 08/12/19  1045 07/26/19  1612   WBC 13.4* 14.1* 21.1* 7.0 4.0 4.9   < > 7.2 7.1 6.8 7.4   < > 8.0 6.6   ANEU  --   --   --   --   --   --   --  5.5 5.3 5.4 5.5  --  5.8 4.6   AEOS  --   --   --   --   --   --   --  0.1 0.1 0.1 0.2  --  0.3 0.4   HGB 7.5* 9.3* 10.3* 8.4* 7.7* 7.9*   < > 11.4* 11.4* 11.3* 11.3*   < > 10.6* 8.9*    103* 101* 98 97 95   < > 97 98 97 97   < > 98 98   * 136* 171 151 148* 159   < > 148* 163 164 161   < > 170 161    < > = values in this interval not displayed.       Metabolic Studies     Recent Labs   Lab Test 02/16/24  0526 02/16/24  0119 02/15/24  2107 02/15/24  1815 02/15/24  1618 02/15/24  1250 12/26/23  1428   *  162* 161* 162* 162* 162* 164* 142   POTASSIUM 3.5 3.6  --   --  4.0 4.5 4.3   CHLORIDE 133* 131*  --   --  128* 130* 108*   CO2 18* 18*  --   --  20* 18* 22   BUN 92.4* 95.1*  --   --  104.0* 107.0* 61.4*   CR 2.15* 2.39*  --   --  2.65* 2.73* 1.85*   GFRESTIMATED 29* 25*  --   --  22* 22* 35*       Hepatic Studies    Recent Labs   Lab Test 02/16/24  0119 02/15/24  1250 12/10/23  0802 12/08/23  1311 09/26/23  1433 04/03/23  1424   BILITOTAL 0.4 0.3 0.2 0.2 <0.2 0.2   ALKPHOS 52 57 64 73 82 81   ALBUMIN 2.9* 3.3* 3.5 3.5 4.0 3.9   AST 35 28 19 15 16 19   ALT 18 15 9 9 10 11       Microbiology:  Culture   Date Value Ref Range Status   02/15/2024 Positive on the 1st day of incubation (A)  Preliminary   02/15/2024 Gram positive cocci (AA)  Preliminary     Comment:     2 of 2 bottles   02/15/2024 No growth after 12 hours  Preliminary   12/10/2023 No Growth  Final   12/10/2023 No Growth  Final   12/08/2023 No Growth  Final   12/08/2023 No Growth  Final   07/13/2023  ">100,000 CFU/mL Proteus mirabilis (A)  Final   02/22/2023 >100,000 CFU/mL Mixture of urogenital chetan  Final   08/08/2022 Trichophyton rubrum (A)  Final       Urine Studies    Recent Labs   Lab Test 02/15/24  1459 07/13/23  1030 02/22/23  1135 11/03/21  1550 04/10/19  1130   LEUKEST Negative Large* Large* Negative Negative   WBCU 4 >182* >182* 1 1       Vancomycin Levels    Recent Labs   Lab Test 12/12/23  0828 12/10/23  0802   VANCOMYCIN 12.3 12.9       Hepatitis B Testing No lab results found.  Hepatitis C Testing   No results found for: \"HCVAB\", \"HQTG\", \"HCGENO\", \"HCPCR\", \"HQTRNA\", \"HEPRNA\"  Respiratory Virus Testing    No results found for: \"RS\", \"FLUAG\"    IMAGING  US Renal Complete Non-Vascular  Result Date: 2/16/2024  IMPRESSION: 1.  No hydronephrosis. 2.  Simple left renal cyst.     XR Chest Port 1 View  Result Date: 2/15/2024  IMPRESSION: Unchanged perihilar and bibasilar streaky/patchy opacity. No appreciable pneumothorax or acute pulmonary consolidations.    Head CT w/o contrast  Result Date: 2/15/2024  Impression: 1. No acute intracranial pathology. 2. Moderate generalized parenchymal volume loss and leukoaraiosis. 3. Right perirolandic chronic insult. 4. Right mastoid and middle ear cavity effusion.     ECHO  Echo Complete  Result Date: 2/15/2024  Interpretation Summary Left ventricular cavity size is small. Left ventricular function is decreased. The ejection fraction is 45-50% (mildly reduced). Global right ventricular function is mildly reduced. An annuloplasty ring is noted in the mitral position. There i no stenosis or regurgitation. Estimated mean right atrial pressure is normal. No pericardial effusion is present.    "

## 2024-02-16 NOTE — PROGRESS NOTES
"CLINICAL NUTRITION SERVICES - ASSESSMENT NOTE     Nutrition Prescription    RECOMMENDATIONS FOR MDs/PROVIDERS TO ORDER:  None    Malnutrition Status:    Patient does not meet two of the established criteria necessary for diagnosing malnutrition but is at risk for malnutrition    Recommendations already ordered by Registered Dietitian (RD):  If enteral nutrition is appropriate and within goals of care, recommendations as follows:  NGT placement  Osmolite 1.5 Teddy (or equivalent) @ goal of  55ml/hr  (1320ml/day) provides: 1980 kcals, 83 g PRO, 1005 ml free H20, 268 g CHO, and 0 g fiber daily.  Titrate with consideration of kcals from D5W IVF    Future/Additional Recommendations:  Monitor for nutrition plan of care/goals of care     REASON FOR ASSESSMENT  Noe Florence is a 88 year old male assessed by the dietitian for Provider Order - Presenting with  FTT, eval for malnutrition. Will discuss TF w/ family. May require TF vs supp recs with electrolyte issues in mind.    Non verbal  Reason for admission:   sepsis, AMS, ARSALAN, and profound hypernatremia likely in the setting of FTT.   PMH: CKD3 (baseline Cr ~1.4-1.8), Afib, CAD with past CABG and stents, ischemic cardiomyopathy, polymorphic V-tach s/p ICD, monoclonal gammopathy of unknown significance, polyneuropathy, L common iliac aneurysm, polymyalgia rheumatica, past colon cancer, recent treatment for L 2nd toe osteomyelitis.     NUTRITION HISTORY  SLP to assess today at 1515  Concerns for dysphagia  Per wife, in the days preceding admission pt was reporting trouble swallowing and was at time caught \"pocketing\" food. No reports to vomiting or diarrhea.     CURRENT NUTRITION ORDERS  Diet: NPO  Intake/Tolerance: N/A  D5W  mL/hr provides 510 kcals/day dextrose    LABS  Labs reviewed   02/16/24 05:26   Sodium 162 (HH)   Sodium Whole Blood 167 (HH)   Potassium 3.5   Chloride 133 (H)   Carbon Dioxide (CO2) 18 (L)   Urea Nitrogen 92.4 (H)   Creatinine 2.15 (H)   GFR " "Estimate 29 (L)   Calcium 8.0 (L)   Anion Gap 11   Magnesium 2.6 (H)   Phosphorus 3.1   Glucose 108 (H)   pH Arterial 7.35   pCO2 Arterial 33 (L)   PO2 Arterial 101   Bicarbonate Arterial 19 (L)   Base Excess Art -6.3 (L)   FIO2 21   Oxyhemoglobin Arterial 97   Gerald's Test Artline   WBC 13.4 (H)   Hemoglobin 7.5 (L)   Hematocrit 24.3 (L)   Platelet Count 127 (L)   RBC Count 2.43 (L)      MCH 30.9   MCHC 30.9 (L)   RDW 16.2 (H)   INR 1.90 (H)   O2 Sat, Arterial 98.5 (H)     MEDICATIONS  Medications reviewed  Current Facility-Administered Medications   Medication    acetaminophen (TYLENOL) tablet 650 mg    Or    acetaminophen (TYLENOL) solution 650 mg    bisacodyl (DULCOLAX) suppository 10 mg    calcium carbonate 500 mg (elemental) (OSCAL) tablet 500 mg    cyanocobalamin (VITAMIN B-12) tablet 2,000 mcg    dextrose 5% infusion    glucose gel 15-30 g    Or    dextrose 50 % injection 25-50 mL    Or    glucagon injection 1 mg    lidocaine (LMX4) cream    lidocaine 1 % 0.1-5 mL    ondansetron (ZOFRAN ODT) ODT tab 4 mg    Or    ondansetron (ZOFRAN) injection 4 mg    piperacillin-tazobactam (ZOSYN) 2.25 g vial to attach to  ml bag    polyethylene glycol (MIRALAX) Packet 17 g    prochlorperazine (COMPAZINE) injection 5 mg    Or    prochlorperazine (COMPAZINE) tablet 5 mg    Or    prochlorperazine (COMPAZINE) suppository 12.5 mg    senna-docusate (SENOKOT-S/PERICOLACE) 8.6-50 MG per tablet 1 tablet    Or    senna-docusate (SENOKOT-S/PERICOLACE) 8.6-50 MG per tablet 2 tablet    sertraline (ZOLOFT) tablet 200 mg    sodium chloride (PF) 0.9% PF flush 10-40 mL    vancomycin place vega - receiving intermittent dosing    Vitamin D3 (CHOLECALCIFEROL) tablet 25 mcg    warfarin ANTICOAGULANT (COUMADIN) tablet 2.5 mg    Warfarin Dose Required Daily - Pharmacist Managed       ANTHROPOMETRICS  Height: 170.2 cm (5' 7\")  Most Recent Weight: 67.1 kg (147 lb 14.9 oz)    IBW: 67.3 kg  Body mass index is 23.17 kg/m .  BMI: " Normal BMI Desired BMI for individuals over age 65: 23-27  Weight History: No clinically significant wt loss noted  Wt Readings from Last 15 Encounters:   02/16/24 67.1 kg (147 lb 14.9 oz)   12/08/23 70.3 kg (155 lb)   12/26/22 69.5 kg (153 lb 3.2 oz)   12/15/22 67.9 kg (149 lb 9.6 oz)   12/08/22 68 kg (150 lb)   12/01/22 67.3 kg (148 lb 6.4 oz)   11/25/22 68 kg (150 lb)   11/21/22 68 kg (150 lb)   11/18/22 68 kg (150 lb)   06/06/22 70.3 kg (155 lb)   11/03/21 70.5 kg (155 lb 8 oz)   09/21/21 71.3 kg (157 lb 3.2 oz)   05/10/21 70.3 kg (155 lb)   05/03/21 69.4 kg (153 lb)   03/23/21 71.1 kg (156 lb 12.8 oz)     Dosing Weight: Actual Body Weight 67.1 kg    ASSESSED NUTRITION NEEDS  Estimated Energy Needs: 1680 - 2015 kcals/day (25 - 30 kcals/kg)  Justification: Maintenance  Estimated Protein Needs: 81 - 101 grams protein/day (1.2 - 1.5 grams of pro/kg)  Justification: Increased needs  Estimated Fluid Needs: 1680 - 2015 mL/day (1 mL/kcal)   Justification: Per provider pending fluid status    PHYSICAL FINDINGS  See malnutrition section below.  Multiple significant skin impairments  Chandler: 12  GI: Last BM  PTA  Bowel regimen: Scheduled  GI concerns reported Constipation    MALNUTRITION  % Intake: </=75% for >/= 1 month (severe) predicted  % Weight Loss: Weight loss does not meet criteria  Subcutaneous Fat Loss: Facial region:  Moderate, advanced age contributing factor  Muscle Loss: Temporal:  Moderate, Scapular bone:  Moderate, Thoracic region (clavicle, acromium bone, deltoid, trapezius, pectoral):  Moderate, Upper leg (quadricep, hamstring):  Moderate, and Posterior calf:  Moderate --advanced age contributing factor  Fluid Accumulation/Edema: None noted  Malnutrition Diagnosis: Moderate malnutrition in the context of acute on chronic illness    NUTRITION DIAGNOSIS  Inadequate oral intake related to decreased LOC, dysphagia as evidenced by NPO diet order pending SLP assessment       INTERVENTIONS  Implementation    Collaboration with other providers     Goals  Diet adv v nutrition support within 2-3 days.     Monitoring/Evaluation  Progress toward goals will be monitored and evaluated per protocol.  Marilin Miller RDN [facility] [address] ICU [address] pager: [phone]   On Call Pager (weekends only): [phone]       126

## 2024-02-16 NOTE — CARE PLAN
SLP: Orders received. Per nursing staff, patient is not following commands and would not be appropriate for participating in a swallow evaluation today. SLP will hold evaluation today and attempt to evaluate in the future, as appropriate.

## 2024-02-16 NOTE — PHARMACY-ANTICOAGULATION SERVICE
Clinical Pharmacy - Warfarin Dosing Consult     Pharmacy has been consulted to manage this patient s warfarin therapy.  Indication: Atrial Fibrillation  Therapy Goal:  (1.5-2.5)  Warfarin Prior to Admission: Yes  Warfarin PTA Regimen: 5 mg QMF, 2.5 all other days    INR   Date Value Ref Range Status   02/16/2024 1.90 (H) 0.85 - 1.15 Final   02/15/2024 1.87 (H) 0.85 - 1.15 Final       Recommend warfarin 2.5 mg today.  Pharmacy will monitor Noe Florence daily and order warfarin doses to achieve specified goal.      Please contact pharmacy as soon as possible if the warfarin needs to be held for a procedure or if the warfarin goals change.      Janice Donato, PharmD, BCPS

## 2024-02-16 NOTE — PROGRESS NOTES
SPIRITUAL HEALTH SERVICES Progress Note  [facility] ([facility]) 10ICU     Spoke with nursing staff and Fr. Moody.  Family is not in this morning.  Fr. Moody will stop by this afternoon.    Trena Roblero   Chaplain Resident  Pager [phone]    * [facility] remains available 24/7 for emergent requests/referrals, either by having the switchboard page the on-call  or by entering an ASAP/STAT consult in Epic (this will also page the on-call ). Routine Epic consults receive an initial response within 24 hours.*

## 2024-02-16 NOTE — PROGRESS NOTES
Report called to [address] [place] [address]. Family req spiritual health visit tomorrow for [facility]. Consult placed,  to visit tomorrow per CLOVIS

## 2024-02-17 NOTE — PHARMACY-VANCOMYCIN DOSING SERVICE
Pharmacy Vancomycin Note  Date of Service 2024  Patient's  1935   88 year old, male    Indication: Sepsis  Day of Therapy: started 2/15/23  Current vancomycin regimen: Intermittent dosing   Current vancomycin monitoring method: Trough (Method 2 = manual dose calculation)  Current vancomycin therapeutic monitoring goal: 15-20 mg/L    Current estimated CrCl = Estimated Creatinine Clearance: 25.9 mL/min (A) (based on SCr of 1.87 mg/dL (H)).    Creatinine for last 3 days  2/15/2024: 12:50 PM Creatinine 2.73 mg/dL;  4:18 PM Creatinine 2.65 mg/dL  2024:  1:19 AM Creatinine 2.39 mg/dL;  5:26 AM Creatinine 2.15 mg/dL;  6:44 PM Creatinine 1.87 mg/dL    Recent Vancomycin Levels (past 3 days)  2024:  6:44 PM Vancomycin 8.5 ug/mL    Vancomycin IV Administrations (past 72 hours)                     vancomycin (VANCOCIN) 1,250 mg in 0.9% NaCl 250 mL intermittent infusion (mg) 1,250 mg New Bag 02/15/24 1805                    Nephrotoxins and other renal medications (From now, onward)      Start     Dose/Rate Route Frequency Ordered Stop    24 1159  vancomycin place vega - receiving intermittent dosing         1 each Intravenous SEE ADMIN INSTRUCTIONS 24 1159                 Contrast Orders - past 72 hours (72h ago, onward)      Start     Dose/Rate Route Frequency Stop    02/15/24 1515  perflutren diluted 1mL to 2mL with saline (OPTISON) diluted injection 6 mL         6 mL Intravenous ONCE 02/15/24 1511            Interpretation of levels and current regimen:  Vancomycin level is reflective of subtherapeutic level; however, given patient's poor renal function, the fact that patient is NOT at steady state yet, and that we've only given one dose - can probably start to schedule a low dose (such as 750mg IV daily) if renal function continues to improve. This level is reassuring as it means patient IS clearing vancomycin.     Has serum creatinine changed greater than 50% in last 72 hours:  Yes    Urine output:  unable to determine    Renal Function: Improving    Plan:  Give vancomycin 750 mg IV once this evening and recheck another level 2/17. The goal is to hopefully be able to schedule a low dose of vancomycin daily if renal function continues to improve. Several different doses were plugged into pharmacokinetic software to calculate what his predicted AUC would be (1250mg IV daily = 866 - way too high, 1000mg IV daily = 698 - still too high, 750mg IV daily = 531). At this point, we will tentatively anticipate being able to schedule his doses after 2/17 level obtained to make sure continues to renally clear the vancomycin and his renal function continues to improve.   Vancomycin monitoring method: Trough (Method 2 = manual dose calculation) with aim to switch to AUC shortly   Vancomycin therapeutic monitoring goal: 15-20 mg/L  Pharmacy will check vancomycin levels as appropriate in 1-3 Days.  Serum creatinine levels will be ordered daily for the first week of therapy and at least twice weekly for subsequent weeks.    JOSE ANTONIO JACKSON, [facility]

## 2024-02-17 NOTE — PROGRESS NOTES
"CLINICAL NUTRITION SERVICES - BRIEF NOTE      Nutrition Prescription     RECOMMENDATIONS FOR MDs/PROVIDERS TO ORDER:  Free water adjustments per team pending fluid status. Currently, FWF are minimal, or for patency.     Recommendations already ordered by Registered Dietitian (RD):  Osmolite 1.5 Teddy (or equivalent) @ goal of 55ml/hr (1320ml/day) provides: 1980 kcals (29 kcal/kg), 83 g PRO (1.2 g pro/kg), 1005 ml free H20, 268 g CHO, and 0 g fiber daily.   --Pending placement and AXR confirmation of feeding tube position  --Start TF @ 15 ml/hr and advance by 10 ml q 6-8 hrs until goal rate.  --Do not start or advance TF rate unless K+ >3.0, Mg++ > 1.5,  and Phos > 1.9.  --Minimum 30 ml q 4 hrs water flushes for tube patency.  --If gastric enteral access: HOB > 30 degrees  --MVI/minerals supplement if not already ordered (Certavite)      Future/Additional Recommendations:  - Monitor TF tolerance, labs, and weight trends  - Monitor GOC  - Monitor diet advancement per SLP    *Please see full assessment note from 2/16/24    Received consult for TF: Registered Dietitian to Assess and Order TF per Medical Nutrition Therapy     New Findings:  - SLP unable to complete swallow evaluation 2/16 due to pt not following commands. SLP will attempt to evaluate in the future, as appropriate.  - Palliative met with family on 2/16 to discuss GOC. Per PA note, \"Rica and Sang would like to wait another day or so to see how Bill does before making any further decisions. If improving, will work towards discharge to a facility (possibly with hospice support). If declining, will consider transition to comfort care or inpatient hospice.\"  - RN attempted to place FT 2/16 but was unsuccessful. Planning to re-attempt NG placement this morning    Labs:  Na: 152 (H) -- down from 167 on 2/16  BUN: 62.2 (H)  Cr: 1.60 (H)  Phos: 1.6 (L)  Hemoglobin A1C: 6.0 (12/8)  Glucose POCT:  over 24 hours    Meds:  LR bolus of 500 mL 2/17  Potassium " chloride  Potassium phosphate  D5 @ 100 mL/hr    Interventions  Enteral Nutrition - Initiate  Feeding tube flush  Multivitamin/mineral supplement therapy    RD to follow per protocol.    Tiffani Ahmadi RD, LD  Weekend/Holiday RD pager: [phone]

## 2024-02-17 NOTE — PROGRESS NOTES
[facility] ICU PROGRESS NOTE  02/17/2024      Date of Service (when I saw the patient): 02/17/2024    ASSESSMENT: Noe Florence is a 88 year old male with PMH notable for CKD3 (baseline Cr ~1.4-1.8), Afib, CAD with past CABG and stents, ischemic cardiomyopathy, polymorphic V-tach s/p ICD, monoclonal gammopathy of unknown significance, polyneuropathy, L common iliac aneurysm, polymyalgia rheumatica, past colon cancer, recent treatment for L 2nd toe osteomyelitis. Admitted to [facility]- ICU for sepsis, AMS, ARSALAN, and profound hypernatremia likely in the setting of failure to thrive.     CHANGES and MAJOR THINGS TODAY:   - 1 unit RBC  - Radiology consult for NG placement- unable to place until Monday 2/19  - Neuro q4hrs with assessments   - Restart home psych meds when enteral access is obtained   - Remove A line  - Follow INR while not taking warfarin   - Transfer to medicine       PLAN:    Neuro:  # Multifactorial toxic metabolic encephalopathy 2/2 hypernatremia, ARSALAN with uremia, and sepsis, improving   # Polyneuropathy  # Depression  # ELVIN  Recently admitted 12/8/23-12/21/23 for AMS and L 2nd toe osteomyelitis. Discharged and living at home in usual state of health. On day of presentation (2/15) he was essentially unresponsive prompting family to bring him to the ED. Head CT with acute intracranial anomalies.   Seizure precautions  Neuro checks with assessments   Acetaminophen PRN   Oxy 2.5 mg PRN   Fentanyl IV 25 mcg PRN   Continue PTA Zoloft 200 mg, PTA Remeron 15 mg at bedtime and risperidone 0.5 mg AM and 1 mg PM once enteral access obtained.   Wife reported patient has not taken baclofen for a long time- if concern for withdrawal- will start IV Roboxin      Pulmonary:  # Acute hypoxia, resolved  Hypoxic when EMS picked him up. Titrated off supplemental O2 in ED. Protecting airway on ICU arrival. CXR 2/15/24: Unchanged perihilar and bibasilar streaky/patchy opacities.   Supplemental O2 to keep SpO2 > 90%   IS q1h  "while awake     Cardiovascular:  # Troponemia, likely demand with clearance worsened by renal dysfunction, improved   # Hx of Afib  # Ischemic cardiomyopathy  # Hypothermia, resolved   # CAD    # HTN  # Hx polymorphic V-tach s/p ICD placement   Troponin continues to trend down, last check 102. EKG on arrival showed SR with RBB.   TTE in ED 2/15/24: Small LV cavity, EF 45-50%, mildly reduced RV & LV function, mitral annuloplasty ring in good position, est RAP normal (EF 55-60% on last ECHO in 2017).  Goal MAP > 65 mmHg  Hold PTA metoprolol tartrate 12.5 mg BID      GI/Nutrition:  # Dysphagia   # Malnutrition, see RD note for specification   # Failure to thrive   # Hypoalbuminemia   # Acute constipation   Per wife, in the days preceding admission pt was reporting trouble swallowing and was at time caught \"pocketing\" food.  No reports to vomiting or diarrhea. Last hospitalization discharged at 70.3 kg, currently 67.1 kgs.   RD consult appreciate recs  Malnutrition Status: Patient does not meet two of the established criteria necessary for diagnosing malnutrition but is at risk for malnutrition    SLP consult appreciate recs  NPO   Radiology consult for NG placement- will have to wait until Monday morning   Scheduled senna-docusate, miralax and supp PRN     Renal/Fluids/Electrolytes:  # Profound hypernatremia, suspect chronic likely 2/2 dehydration and failure to thrive, improved  # ARSALAN on CKD3, prerenal in the setting of dehydration, improved   # Hypophosphatemia   # AGAMA, resolved   # BPH  Presented with hypernatremia Na+ 164 and ARSALAN on CKD Cr 2.73. Would suspect hypernatremia has been present > 48 hours as symptoms of FTT developed slowly over ~ 3 days with limited po intake, suggesting chronic process. Renal US negative for hydronephrosis.   Adjust D5 infusion for sodium goal  -  this morning   Sodium checks q12hrs, daily CMP   Alberto for accurate I/Os  Accurate I/Os with daily weights   Hold PTA urea gel and " flomax   Spot replace electrolytes     Endocrine:  # Stress induced hyperglycemia  Goal -180  No current indication for insulin  Hypoglycemia protocol      ID:  # Sepsis, unclear source, likely ongoing osteomyelitis vs new wound infection   # Leukocytosis 2/2 above   # GPCs in BC - contaminant vs true infection  # R middle ear effusion  # Numerous new pressure wounds  # Recent treatment for L 2nd toe osteomyelitis  # recent COVID-19 infection (12/10/23), now recovered   Recently treated from acute on chronic L 2nd toe osteomyelitis. Stopped abx on 1/31/24 after following up with ID. However, he now has numerous pressure wounds involving feet, hips, arms, and coccyx (photos in chart), but none with cellulitic appearance or overt drainage. CXR without acute infiltrates or consolidations. 2 of 2 BC in 1 of 2 cx thusfar growing GPCs, unclear at this point if contaminant vs true infection but pt does have multiple portals of entry with new skin lesions. UA without overt signs of infection. Head CT notable for R middle ear effusion.  ID consult, appreciate recs  Daily CBC     Relevant cultures:  - 12/10/23 COVID (+)  - 2/15/24 BC - GPCs in 2 of 2 bottles, 1 of 2 cultures   - 2/15 UA - few bacteria, no reflex  - 2/16 MRSA nares - Negative   - 2/16 BC ordered for AM    Antibiotics:  - Vancomycin (2/15 - current )  - Zosyn (2/15 -2/16)  - Ceftriaxone (2/16- current)         Hematology/Vascular:    # Chronic anemia, now with mild macrocytosis -->  suspect nutritional component  # Thrombocytopenia  # Hx DVT, on warfarin  # L common iliac artery aneurysm  # Hx MGUS  # Drug induced coagulation defect   Continue PTA warfarin when enteral access obtained  Monitor INR daily while unable to give warfarin- 2.21 on 2/17  Goal 1.5-2.5 per last cards note given frequent falls  Daily CBC         # Hx of bilateral lower extremity edema 2/2 venous insufficiency  Generally wears compression stockings, but was instructed not to wear  them following most recent hospitalization. Vascular surgery recommends he has some compression therapy, but agrees with no stockings while pt has an active foot wound  Hold off on lymphedema consult as pt is currently unable to communicate CMS   Patient to f/u with Vascular Surgery as outpatient.       Musculoskeletal:  # Osteoporosis  # Chronic foot wounds with pain leading to intolerance of walking  # Frailty  # FTT  In regards to chronic management of the L toe wounds, recently evaluated by vascular surgery who felt his wounds were not ischemic and were rather pressure related. Has also been seen by orthopedics who recommend medical management. No weight bearing restrictions, but patient is immobile at baseline.   Hold PTA fosamax while inpt, continue calcium supplement  PT/OT consult       Skin:  # Chronic L 2nd toe wound  # New pressure wounds involving coccyx, R hip, R elbow, R ankle, and L heel  WOC consult   Photos in chart 2/16    General Cares/Prophylaxis:    DVT Prophylaxis: Warfarin  GI Prophylaxis: Not indicated  Restraints: NA  Family Communication: Partner Nicolette updated at bedside  Code Status: DNR/DNI    Lines/tubes/drains:  - PIV x2  - Dominguez  - Art line- remove     Disposition:  - Transfer to medicine     Patient seen and findings/plan discussed with medical ICU staff, Dr. Najera.    Non-critical care time: 50 min     MARIA LUISA Virk CNP    ====================================  INTERVAL HISTORY:   No acute events. Hypernatremia improved to level of 152. Neuro exam improving, patient more awake, but still not able to follow commands. Seems to be about at baseline. Unable to place NG yesterday. Will attempt placing large bore at bedside vs placement in IR. Given 1 unit RBC for hgb 6.9. Patient stable to transfer to medicine     OBJECTIVE:   1. VITAL SIGNS:   Temp:  [97.5  F (36.4  C)-98.8  F (37.1  C)] 98.8  F (37.1  C)  Pulse:  [54-90] 65  Resp:  [0-30] 23  BP: ()/(46-78) 115/53  MAP:  [44  mmHg-79 mmHg] 69 mmHg  Arterial Line BP: ()/(27-52) 127/43  SpO2:  [77 %-100 %] 97 %  Resp: 23    2. INTAKE/ OUTPUT:   I/O last 3 completed shifts:  In: 1863 [I.V.:1863]  Out: 990 [Urine:990]    3. PHYSICAL EXAMINATION:  General: Frail, chronically ill appearing, laying in bed in NAD  HEENT: Normocephalic, atraumatic, oral mucosa and lips dry, neck with mild kyphotic flexture  Neuro: ABELARDO orientation, pt intermittently speaking, pupils 2mm, blinks to threat and tracks, moves all extremities  Pulm/Resp: Clear breath sounds bilaterally without rhonchi, crackles or wheeze, breathing non-labored  CV: RRR, no RMG, extremities warm, pulses 2+, 1+ pitting edema extending to the hips and also involving the hands   Abdomen: Soft, non-distended, non-tender  : mo catheter in place, urine yellow and clear  Incisions/Skin: Chronic flat brown plaques involving abd and thoracic cage. Pressure sores noted to the L 1-3 toes, coccyx, R hip, R elbow, R ankle, and L heel. No surrounding erythema, exudates, or fluctuance appreciated        4. LABS:   Arterial Blood Gases   Recent Labs   Lab 02/16/24  0526   PH 7.35   PCO2 33*   PO2 101   HCO3 19*     Complete Blood Count   Recent Labs   Lab 02/17/24  0420 02/16/24  0526 02/16/24  0119 02/15/24  1250   WBC 12.8* 13.4* 14.1* 21.1*   HGB 6.9* 7.5* 9.3* 10.3*   * 127* 136* 171     Basic Metabolic Panel  Recent Labs   Lab 02/17/24  0635 02/17/24  0424 02/17/24  0420 02/17/24  0009 02/17/24  0007 02/16/24  1844 02/16/24  1330 02/16/24  1027 02/16/24  0746 02/16/24  0526   NA  --   --  152*  152* 151*  --  156*  --  161*   < > 167*  162*   POTASSIUM  --   --  3.8 3.2*  --  3.1*  --   --   --  3.5   CHLORIDE  --   --  126* 125*  --  128*  --   --   --  133*   CO2  --   --  17* 18*  --  18*  --   --   --  18*   BUN  --   --  62.2* 66.5*  --  74.2*  --   --   --  92.4*   CR  --   --  1.60* 1.66*  --  1.87*  --   --   --  2.15*   GLC 93 85 97 140*   < > 214*   < >  --    < >  108*    < > = values in this interval not displayed.     Liver Function Tests  Recent Labs   Lab 02/17/24  0420 02/16/24  0526 02/16/24  0119 02/15/24  1250 02/15/24  1232 02/14/24  0000   AST  --   --  35 28  --   --    ALT  --   --  18 15  --   --    ALKPHOS  --   --  52 57  --   --    BILITOTAL  --   --  0.4 0.3  --   --    ALBUMIN  --   --  2.9* 3.3*  --   --    INR 2.21* 1.90*  --   --  1.87* 1.7*     Coagulation Profile  Recent Labs   Lab 02/17/24  0420 02/16/24  0526 02/15/24  1232 02/14/24  0000   INR 2.21* 1.90* 1.87* 1.7*       5. RADIOLOGY:   Recent Results (from the past 24 hour(s))   US Renal Complete Non-Vascular    Narrative    EXAMINATION: US RENAL COMPLETE NON-VASCULAR, 2/16/2024 8:21 AM     COMPARISON: CT abdomen and pelvis dated 12/6/2023.    HISTORY: ARSALAN    TECHNIQUE: The kidneys and bladder were scanned in the standard  fashion with specialized ultrasound transducer(s) using both gray  scale and limited color/spectral Doppler techniques.    FINDINGS:    Right kidney: Measures 11.1 cm in length. Parenchyma is of normal  thickness and echogenicity. No focal mass. No hydronephrosis.    Left kidney: Measures 10 cm in length. Parenchyma is of normal  thickness and echogenicity. 1.9 x 1.7 x 1.8 cm anechoic cyst noted in  the upper pole. No hydronephrosis.     Bladder: Decompressed by Dominguez.      Impression    IMPRESSION:  1.  No hydronephrosis.  2.  Simple left renal cyst.    DORIS BUSTAMANTE MD         SYSTEM ID:  [id number]

## 2024-02-17 NOTE — PROGRESS NOTES
10A ICU End of Shift Summary.      Changes this shift:   Neuro: Patient is lethargic, not following commands, attempting to speak at times but incoherent.Opens eyes to voice and stimuli, groans with turns and touch. Per family he does not follow commands at baseline.  Cardiac: NSR in 60-80, occasional PVC's  Respiratory: Room air, shallow breaths, lung sounds clear/diminished.   GI/: Dominguez in place with adequate output. No BM this shift  Diet/appetite: NPO - Unsuccessful attempt to place NG today.  Pain: Pain with turns and wound care, given Tylenol suppository and 1 time dose of IV Dilaudid -pt became hypotensive with Dilaudid.  Skin: Many- See LDA's. Wound care saw pt today and placed wound care orders.  LDA's: Right radial ART line. Left PIV x 2  Activities: Turns in bed q2H     Drips: D5% at 125mL/hr        Plan: Monitor Hyponatremia, Pain control.

## 2024-02-17 NOTE — CONSULTS
Spoke to bedside RN and pt does not require PICC at this time.  Please consult VAT if access needs change.

## 2024-02-17 NOTE — PLAN OF CARE
Goal Outcome Evaluation:     Changes this shift:   Pt was able to state name when assessed intermittently. Hemoglobin 6.9 in am. BP decrease after pain management via dilaudid 0.2 IV push. 500 ml bolus of LR to compensate for this and was effective.     Neuro: RASS 0,-1, PERRLA with 3mm equal and round. Confused to time place and situation. Pt will speak slowly in horse voice.   Cardiac: Pt has had low MAPs at times INGRIS indicated to use cuff remainder of night and provided 500 ml bolus of LR. ECG showing Irregular HR with SN rhythm and intermittent PVCs. Bradycardiac at times.    Respiratory: Pt is having regular rate and rhythm with O2 saturation at 95. Diminished lung sounds in lower lobes.   GI/:  Pt voiding via mo catheter with substantial output. Hypoactive bowl sounds but present. During insertion of tylenol suppository stool buren was palpable.   Diet/appetite: NPO including medications until NG placed. Planing to re attempt placement of NG in am.   Activity: Bed bound with 2 hr turns.   Pain: Pt appeared to be in pain and received prn IV dilaudid dose. Pt had unstable vs following admission of dilaudid so plan for fentaNYL 25 mcg use to see if it is more effective.    Skin: Pt has  multiple wounds. Pressure injury to R wrist, R hip, R thigh, R elbow, L foot, and sacrum.      Plan:  Plan to discuss further care with family in am and continue to monitor sodium levels. Plan is to get consent for blood in am due current hemoglobin. Please administer suppository if pt does not have stool today.

## 2024-02-17 NOTE — PROGRESS NOTES
[facility]    Medicine Progress Note - Hospitalist Service, GOLD TEAM     Date of Admission:  2/15/2024    Assessment & Plan     Patient was admitted to the ICU for evaluation of sepsis and multifactorial toxic metabolic encephalopathy secondary to severe hypernatremia with sodium more than 160, acute kidney injury with uremia and sepsis.      Patient has a past medical history significant forCKD3 (baseline Cr ~1.4-1.8), Afib, CAD with past CABG and stents, ischemic cardiomyopathy, polymorphic V-tach s/p ICD, monoclonal gammopathy of unknown significance, polyneuropathy, L common iliac aneurysm, polymyalgia rheumatica, past colon cancer, recent treatment for L 2nd toe osteomyelitis.     Per ICU team the patient is clinically improving and ready to transfer to the medical floor.  Hospitalist team will take over and take care of the patient.  I discussed the case with ICU team and review the chart.  Patient's vital signs are stable.  Patient's hemoglobin 6.9 this morning and he received 1 unit of red blood cells.  Sodium 151 this morning, trending down, patient continues on IV fluids with D5 infusion, continue trending sodium.  Per ICU team mental status is improving.  Sepsis was considered to be secondary to wound infection or ongoing osteomyelitis.  Patient is afebrile at this moment and white blood cell count trending down.  Patient also has severe dysphagia and IR will place a feeding tube on Monday.    Due to multiple medical issues and being sick on admission palliative care team was consulted and evaluated the patient.  Patient is DNR DNI and according to ICU team report family would consider transition to comfort care if patient decompensates again.    Patient will be assigned to one of the internal medicine teams tonight.      Vital signs:  Temp: (P) 99.1  F (37.3  C) Temp src: (P) Axillary BP: 111/52 Pulse: 62   Resp: 18 SpO2: 95 % O2 Device: None (Room air)  "Oxygen Delivery: 2 LPM Height: 170.2 cm (5' 7\") Weight: 67.1 kg (147 lb 14.9 oz)  Estimated body mass index is 23.17 kg/m  as calculated from the following:    Height as of this encounter: 1.702 m (5' 7\").    Weight as of this encounter: 67.1 kg (147 lb 14.9 oz).      Harsh Pineda MD  Hospitalist Service, [facility]  Securely message with XING (more info)  Text page via [facility] Paging/Directory   See signed in provider for up to date coverage information    "

## 2024-02-18 NOTE — PROGRESS NOTES
[facility]  _________________________________________________________________    [facility] 24/7 Contact Number: [phone]    - Providers: Please contact [facility] with changes in orders or clinical plan of care   - Nursing: Please contact [facility] with significant changes in patient condition    Hospice will notify the care team (including the hospitalist) to confirm date of inpatient hospice (GIP) admission.    New Epic encounter will not be created until hospice completes admission.   _____________________________________________________________________    REFERRAL RECEIVED. THANK YOU FOR THE REFERRAL   WRITER WILL CONTACT PT/FAMILY TO SCHEDULE INFORMATIONAL HOSPICE VISIT.

## 2024-02-18 NOTE — PLAN OF CARE
SLP: Orders received. Per nursing staff, patient continues to not be able to follow commands, unable to manage his own secretions and aspirating with swabs and would not be appropriate for participating in a swallow evaluation today. SLP will hold evaluation today and attempt to evaluate in the future, as appropriate.

## 2024-02-18 NOTE — PHARMACY-VANCOMYCIN DOSING SERVICE
Pharmacy Vancomycin Note  Date of Service 2024  Patient's  1935   88 year old, male    Indication: Sepsis  Day of Therapy: 2/15  Current vancomycin regimen:  Intermitten dosing  Current vancomycin monitoring method: Trough (Method 2 = manual dose calculation)  Current vancomycin therapeutic monitoring goal: 15-20 mg/L      Current estimated CrCl = Estimated Creatinine Clearance: 37 mL/min (A) (based on SCr of 1.31 mg/dL (H)).    Creatinine for last 3 days  2/15/2024: 12:50 PM Creatinine 2.73 mg/dL;  4:18 PM Creatinine 2.65 mg/dL  2024:  1:19 AM Creatinine 2.39 mg/dL;  5:26 AM Creatinine 2.15 mg/dL;  6:44 PM Creatinine 1.87 mg/dL  2024: 12:09 AM Creatinine 1.66 mg/dL;  4:20 AM Creatinine 1.60 mg/dL;  7:56 PM Creatinine 1.31 mg/dL    Recent Vancomycin Levels (past 3 days)  2024:  6:44 PM Vancomycin 8.5 ug/mL  2024:  7:56 PM Vancomycin 12.4 ug/mL    Vancomycin IV Administrations (past 72 hours)                     vancomycin (VANCOCIN) 750 mg in sodium chloride 0.9 % 250 mL intermittent infusion (mg) 750 mg New Bag 24 2102    vancomycin (VANCOCIN) 1,250 mg in 0.9% NaCl 250 mL intermittent infusion (mg) 1,250 mg New Bag 02/15/24 1805                    Nephrotoxins and other renal medications (From now, onward)      Start     Dose/Rate Route Frequency Ordered Stop    24 1159  vancomycin place vega - receiving intermittent dosing         1 each Intravenous SEE ADMIN INSTRUCTIONS 24 1159                 Contrast Orders - past 72 hours (72h ago, onward)      Start     Dose/Rate Route Frequency Stop    02/15/24 1515  perflutren diluted 1mL to 2mL with saline (OPTISON) diluted injection 6 mL         6 mL Intravenous ONCE 02/15/24 1511            Interpretation of levels and current regimen:  Vancomycin level is reflective of therapeutic level    Has serum creatinine changed greater than 50% in last 72 hours: No    Urine output:  good urine output    Renal Function:  Improving    Insight[employer] Prediction of Planned New Vancomycin Regimen  Loading dose: N/A  Regimen: 750 mg IV every 24 hours.  Start time: 21:02 on 02/17/2024  Exposure target: AUC24 (range)400-600 mg/L.hr   AUC24,ss: 435 mg/L.hr  Probability of AUC24 > 400: 66 %  Ctrough,ss: 14.5 mg/L  Probability of Ctrough,ss > 20: 11 %  Probability of nephrotoxicity (Lodise JUNIOR 2009): 10 %    Patient was dose intermittently due to ARSALAN. Scr is returned to baseline (Cr ~1.4-1.8) and good urine output, therefore, will stop intermittent dosing. Will schedule the vanco dosing.     Plan:  Start vancomycin 750 mg IV once dialy.   Vancomycin monitoring method: AUC  Vancomycin therapeutic monitoring goal: 400-600 mg*h/L  Pharmacy will check vancomycin levels as appropriate in 1-3 Days.  Serum creatinine levels will be ordered daily for the first week of therapy and at least twice weekly for subsequent weeks.    Vikas Camp, [facility]

## 2024-02-18 NOTE — PROGRESS NOTES
I was notified about increasing edema and crackles. Mr. Florence has been treated for hypernatremia with D5W with improvement, however now with worsening volume overload. Still breathing on room air. He remains confused but denies shortness of breath or chest pain. Intermittent changes of ST segment on telemetry monitor at bedside. Vital signs remain stable.   Noted ongoing elevated troponin, EF 45%, and persistently elevated BNP. Hypernatremia improving. Unfortunately attempts to obtain enteral access have not been successful.   Due to current volume overload, hold D5W infusion for now pending results of morning labs and clinical course. Obtain ECG. Due to severe cachexia, I suspect the creatinine over-estimates his actual kidney function. Fluid status will require ongoing monitoring and adjustment given his multiple medical issues at this time. I suspect that he will need additional hypotonic fluids, likely at a lower rate, ideally enterally if access can be obtained.     I spent 25 minutes including chart review, examining the patient, and discussing with bedside RN.     Hilary Salmon MD  02/18/24  3:46 AM

## 2024-02-18 NOTE — PROGRESS NOTES
Changes this shift:   Neuro: Neuro status improved since yesterday, now Following some commands, and speaking some words.   Cardiac: NSR/SVR in 50-70's, frequent pvc's. One 17 beat run or V-tach this evening- Pt asymptomatic.(Provider notified- Labs ordered)  Respiratory: Room air, shallow breaths, lung sounds clear/diminished.   GI/: Dominguez removed at 1630. No BM this shift  Diet/appetite: NPO - Another unsuccessful attempt to place NG today. Possible IR placement on Monday?  Pain: Pain with turns and wound care, PRN Tylenol suppositories and fentanyl.  Skin: Many- See LDA's. Dressings changed per WOC orders.  LDA's: Right radial ART line removed. Left PIV x 2  Activities: Turns in bed q2H     Drips: D5% at 125mL/hr        Plan: Monitor Hyponatremia, Pain control.

## 2024-02-18 NOTE — PROGRESS NOTES
[facility]    Medicine Progress Note - Hospitalist Service, GOLD TEAM 19    Date of Admission:  2/15/2024    Assessment & Plan   Noe Florence is a 88 year old male with PMH notable for CKD3 (baseline Cr ~1.4-1.8), Afib, CAD with past CABG and stents, ischemic cardiomyopathy, polymorphic V-tach s/p ICD, dementia, monoclonal gammopathy of unknown significance, polyneuropathy, L common iliac aneurysm, polymyalgia rheumatica, past colon cancer, recent treatment for L 2nd toe osteomyelitis. Admitted to [facility]- ICU for sepsis, AMS, ARSALAN, and profound hypernatremia in the setting of failure to thrive.     Severe oropharyngeal dysphagia, profound hypernatremia, ARSALAN with uremia, severe malnutrition, metabolic encephalopathy with baseline dementia:   Failure to thrive with progressive decline over the past few years, accelerated over the past few months.  Patient has been living with and cared for by his significant other Rica, with progressive decline upon returning home from the hospital when he was treated for osteomyelitis of the toe 12/2023.  He has required a Lupillo lift and in-home help twice a day to assist with basic cares.  More recently Rica and patient's son See note that he has had increasing difficulty swallowing, pocketing food and not able to tolerate oral nutritional or liquid intake.  He become progressively weak and bedridden.  Initial labs upon admission to the hospital 2/15/2024 significant for sodium 164, , bicarb 18, creatinine 2.73, with baseline creatinine 1.4-1.8.  , troponin 140 with subsequent downward trend, elevated BNP, WBC 21, hemoglobin 10.3.  Head CT showed old ischemic changes and moderate generalized volume loss with leukoaraiosis.  CXR stable with perihilar and bibasilar streaky/patchy opacities.  1 of 2 blood cultures positive for Staph epidermidis and Enterococcus faecalis.  Repeat blood cultures 2/16/2024 have  been negative to date.  History of recently treated left toe osteomyelitis, had been seen by infectious disease 1/31/2024 following an extended course of antibiotics, and antibiotics have been stopped at that time.  The patient was admitted initially to the ICU, treated with hypotonic IV fluids for severe hypernatremia, ARSALAN, supplemental oxygen and empiric IV antibiotics with Zosyn and vancomycin.  Infectious disease was consulted.  Hypernatremia, ARSALAN subsequently improved with hypotonic fluids, with some transient improvement in encephalopathy, but continued severe dysphagia for which he was assessed by ST and unable to manage his own secretions let alone oral intake.  Subsequently developed worsening volume overload with CHF, confirmed by markedly elevated BNP in the 20,000's, CXR 2/18 showing vascular congestion despite sodium being still slightly elevated at 147, necessitating discontinuation of hypotonic IV fluids.    Palliative care has been following in the hospital and met with patient's significant other Rica and patient's son See on 2/16, with goals of care discussions including DNR/DNI and consideration of hospice.    On 2/18 patient is once again very lethargic, attempt some verbalization but incoherent, but does appear comfortable and oxygenating adequately on room air despite volume overload, with head of bed elevated.  Per RN, has been tolerating IV fentanyl for his chronic pain, previous use of Dilaudid resulted in recurrent hypotension.    I met with patient's significant other Rica, and his son See for an extended family conference on 2/18 to review clinical course and goals of care.  Rica notes that his quality of life has been very poor for the past year, particularly since December following his hospitalization for osteomyelitis.  She has been caring for him at home with the help of aides in the morning and in the evening.  He has required a Lupillo lift, had previously  "been using a Nicci steady at home prior to that hospitalization.  His hospital bed is in the living room.  Rica and See have noticed increased difficulty swallowing over the past few weeks with him pocketing food and taking very little oral intake.  See agrees that his father's quality of life \"is poor\".  See feels that his father's progressive dementia over the past few years has contributed to his decline.  We discussed the competing ongoing issue of worsening CHF despite persistent hypernatremia, he has persistent severe dysphagia despite improvement in his hypernatremia, his baseline poor quality of life and the unlikelihood that we would experience any meaningful recovery with ongoing aggressive medical care.  Rica and See both expressed a desire to transition care focus to comfort and initiate hospice care.  Rica notes that the increased expenses of caring for him over the past few years have drastically depleted their finances, and they would hope that he could receive hospice care onsite.  We discussed immediate care plans, they both agreed with discontinuing IV fluids, antibiotics, lab tests, Coumadin, other noncomfort related interventions and transition to comfort care.  He is currently tolerating IV fentanyl and it was agreed to continue this for his acute and chronic pain needs.  I subsequently spoke with Dr. Winston of palliative care, who recommended general inpatient hospice consultation given the above.  New orders placed 2/18:  -Discontinue IV fluids, lab draws, antibiotics, Coumadin, Accu-Cheks  -Comfort care order set placed, continue IV fentanyl for pain as he is tolerating this thus far better than he had Dilaudid  -General inpatient hospice consult placed    CAD, ICM, H/O Vtach s/p ICD, H/O Afib, Acute on chronic systolic CHF:  ECHO 2/15/2024: EF 45-50%, previous EF 55-60% 1/30/2017.  Had elevated troponin on admission, with subsequent trend down.  EKG V " paced.  Required hypotonic IV fluids for severe hypernatremia, ARSALAN with uremia at the time of admission, but subsequently developed worsening volume overload, marked increase in BNP with vascular congestion on chest x-ray despite persistent, albeit improved hypernatremia making volume management extremely tenuous.  He currently appears to be tolerating volume overload well from a respiratory standpoint, saturating 98% on room air.  Receiving fentanyl for pain, will which should help with dyspnea.  As discussed, family conference held with patient's significant other Rica and his son See on 2/18.  -discontinue IV fluids, Coumadin and further lab draws.  Transition to comfort cares with hospice.    CKD, stage III with ARSALAN:  Baseline creatinine 1.4-1.8.  Initial labs on 2/15 notable for sodium 164, bicarb 18, , creatinine 2.73 in the setting of very poor oral nutritional and fluid intake at home.  Improved with hypotonic IV fluids, but now volume overloaded with vascular congestion/CHF on chest x-ray and markedly elevated BNP.  This despite sodium still being mildly elevated at 147.  Now transitioning to comfort care with hospice.  -Discontinue hypotonic fluids, discontinue lab draws  -Comfort focused care    Sepsis with history of osteomyelitis of the left second toe:  Had been hospitalized 12/2023 for osteomyelitis of the left second toe.  Received an extended 7.5 week course of antibiotics.  Had follow-up outpatient with infectious disease 1/31/2024, antibiotics discontinued.  At hospital admission noted to have elevated CRP, leukocytosis in the setting of ARSALAN, severe hypernatremia and uremia.  1 of 2 blood cultures 2/15 grew Staph epidermidis and Enterococcus faecalis.  Treated with IV Zosyn and vancomycin initially.  Subsequent blood culture 2/16 has been negative to date.  Leukocytosis resolving.  Has ongoing left toe and foot wounds as well as multiple other pressure sores for which wound care  nursing is following.  Infectious disease has been following in the hospital.  Unfortunately patient's baseline functional status very poor with progressive decline over the past few years, accelerated over the past few months.  Although labs and hemodynamics have improved with IV fluids, he remains very lethargic, unable to tolerate oral intake due to severe dysphagia.  Now transitioning to comfort care.    -Discussed with family, they are in agreement with discontinuing antibiotics and further lab draws.    Chronic anemia, thrombocytopenia, MGUS:  Transfused 1 unit for hemoglobin 6.9 on 2/17, subsequent worsening volume overload.  CBC stable 2/18/2024.  Now transitioning to comfort cares.  -Discontinue further lab draws    History of DVT:  Transition to hospice, comfort cares.  Discontinue Coumadin, INR draws.    Lymphedema:  Symptomatic management at this point.    Chronic left foot, right lateral malleoli are wounds with history of left second toe osteomyelitis, recently treated with new/more recent onset pressure ulcers:  As above, discontinue antibiotics, lab draws.  Continue current wound cares, optimize comfort.  Fentanyl as needed for pain.    History of left common iliac artery aneurysm:      Diet: NPO for Medical/Clinical Reasons Except for: Meds    DVT Prophylaxis: had been on coumadin, transitioned to comfort cares 2/18/24, coumadin stopped.   Dominguez Catheter: Not present  Lines: None     Cardiac Monitoring: None  Code Status: No CPR- Do NOT Intubate      Clinically Significant Risk Factors        # Hypokalemia: Lowest K = 3.1 mmol/L in last 2 days, will replace as needed  # Hypernatremia: Highest Na = 156 mmol/L in last 2 days, will monitor as appropriate      # Hypoalbuminemia: Lowest albumin = 2.9 g/dL at 2/16/2024  1:19 AM, will monitor as appropriate  # Coagulation Defect: INR = 2.36 (Ref range: 0.85 - 1.15) and/or PTT = 33 Seconds (Ref range: 22 - 38 Seconds), will monitor for bleeding  #  Thrombocytopenia: Lowest platelets = 126 in last 2 days, will monitor for bleeding   # Hypertension: Noted on problem list         # Moderate Malnutrition: based on nutrition assessment, PRESENT ON ADMISSION   # Financial/Environmental Concerns: none   # ICD device present       Disposition Plan            Marcos Blount MD  Hospitalist Service, GOLD TEAM 19  [facility]  Securely message with [employer] (more info)  Text page via [facility] Paging/Directory   See signed in provider for up to date coverage information  ______________________________________________________________________    Interval History   I assessed, examined the patient earlier this morning.  Remains very lethargic, minimally verbal but does open eyes and attempt some weak speech.  Appears comfortable.  Per ST, unable to manage his own secretions, remains NPO.  Patient lying in bed, appears very weak, lethargic, chronically ill.  Following today's visit I contacted his significant other Rica, and she will plan to come to the hospital in the next few hours with patient's son See to review his current health status and goals of care.    Physical Exam   Vital Signs: Temp: 99.3  F (37.4  C) Temp src: Axillary BP: 116/69 Pulse: 79   Resp: 15 SpO2: 98 % O2 Device: None (Room air)    Weight: 150 lbs 5.66 oz  General: Lethargic, lying in bed but does open his eyes and smiles, attempts weak speech but otherwise unable to converse.  Appears comfortable, chronically ill.  HEENT: Slight disconjugate gaze, but does track with his eyes and smiles.  OP desiccated.  Neck supple, nontender.  Chest: Bilateral coarse rales in the mid and lower lung fields.  No rhonchi or wheezes.  CV: RRR.  No audible murmurs.  Defibrillator device palpable in the left upper chest.  Abdomen: NABS.  Soft, nondistended.  No tenderness evident with deep palpation.  Extremities, skin: 2+ right greater than left arm and sacral  edema.  Left foot wrapped in Kerlix.  Right thigh wound, right lateral malleolus wound dressings intact.  Right arm wrapped in Kerlix.    80 MINUTES SPENT BY ME on the date of service doing chart review, history, exam, documentation & further activities per the note.      Data      CMP  Recent Labs   Lab 02/18/24  1207 02/18/24  1159 02/18/24  0845 02/18/24  0553 02/18/24  0423 02/18/24  0032 02/17/24  1956 02/17/24  1614 02/17/24  1350 02/17/24  0424 02/17/24  0420 02/17/24  0009 02/16/24  0746 02/16/24  0526 02/16/24  0328 02/16/24  0119 02/15/24  1618 02/15/24  1250   NA  --  147*  --  147*  147*  --   --  148*  148*  --  151*  --  152*  152* 151*   < > 167*  162*  --  161*   < > 164*   POTASSIUM  --   --   --  3.3*  --   --  3.4  --   --   --  3.8 3.2*   < > 3.5  --  3.6   < > 4.5   CHLORIDE  --   --   --  121*  --   --  121*  --   --   --  126* 125*   < > 133*  --  131*   < > 130*   CO2  --   --   --  16*  --   --  18*  --   --   --  17* 18*   < > 18*  --  18*   < > 18*   ANIONGAP  --   --   --  10  --   --  9  --   --   --  9 8   < > 11  --  12   < > 16*   GLC 88  --  101* 96 101*   < > 135*   < >  --    < > 97 140*   < > 108*   < > 136*   < > 140*   BUN  --   --   --  36.1*  --   --  44.5*  --   --   --  62.2* 66.5*   < > 92.4*  --  95.1*   < > 107.0*   CR  --   --   --  1.18*  --   --  1.31*  --   --   --  1.60* 1.66*   < > 2.15*  --  2.39*   < > 2.73*   GFRESTIMATED  --   --   --  59*  --   --  52*  --   --   --  41* 39*   < > 29*  --  25*   < > 22*   KEITH  --   --   --  7.8*  --   --  7.8*  --   --   --  7.9* 7.9*   < > 8.0*  --  8.5*   < > 9.8   MAG  --   --   --   --   --   --  1.8  --   --   --  1.9  --   --  2.6*  --  2.4*  --   --    PHOS  --   --   --   --   --   --   --   --   --   --  1.6*  --   --  3.1  --  3.3  --   --    PROTTOTAL  --   --   --   --   --   --   --   --   --   --   --   --   --   --   --  5.8*  --  6.5   ALBUMIN  --   --   --   --   --   --   --   --   --   --   --   --   --    --   --  2.9*  --  3.3*   BILITOTAL  --   --   --   --   --   --   --   --   --   --   --   --   --   --   --  0.4  --  0.3   ALKPHOS  --   --   --   --   --   --   --   --   --   --   --   --   --   --   --  52  --  57   AST  --   --   --   --   --   --   --   --   --   --   --   --   --   --   --  35  --  28   ALT  --   --   --   --   --   --   --   --   --   --   --   --   --   --   --  18  --  15    < > = values in this interval not displayed.     CBC  Recent Labs   Lab 02/18/24 0553 02/17/24 0420 02/16/24 0526 02/16/24 0119   WBC 11.1* 12.8* 13.4* 14.1*   RBC 2.81* 2.27* 2.43* 3.00*   HGB 8.4* 6.9* 7.5* 9.3*   HCT 27.6* 22.2* 24.3* 31.0*   MCV 98 98 100 103*   MCH 29.9 30.4 30.9 31.0   MCHC 30.4* 31.1* 30.9* 30.0*   RDW 18.0* 15.9* 16.2* 16.0*   * 126* 127* 136*     INR  Recent Labs   Lab 02/18/24 0553 02/17/24  0420 02/16/24  0526 02/15/24  1232   INR 2.36* 2.21* 1.90* 1.87*     Arterial Blood Gas  Recent Labs   Lab 02/18/24  1013 02/16/24  0526 02/16/24  0119   PH  --  7.35  --    PCO2  --  33*  --    PO2  --  101  --    HCO3  --  19*  --    O2PER 21 21 21   Venous Blood Gas  Recent Labs   Lab 02/18/24  1013 02/16/24  0526 02/16/24  0119   PHV 7.41  --  7.26*   PCO2V 30*  --  46   PO2V 40  --  21*   HCO3V 19*  --  20*   UZIEL -4.9*  --  -6.3*   O2PER 21 21 21     Hemoglobin A1C   Date Value Ref Range Status   12/08/2023 6.0 (H) <5.7 % Final     Comment:     Normal <5.7%   Prediabetes 5.7-6.4%    Diabetes 6.5% or higher     Note: Adopted from ADA consensus guidelines.   02/27/2012 5.7 4.3 - 6.0 % Final   01/27/2011 6.0 4.3 - 6.0 % Final   01/16/2009 6.2 (H) 4.3 - 6.0 % Final     Hemoglobin A1C POCT   Date Value Ref Range Status   05/05/2011 6.0 4.3 - 6.0 % Final     Comment:     Testing performed in clinic   12/21/2010 5.8 4.3 - 6.0 % Final     Comment:     Testing performed in clinic   03/15/2010 6.1 (H) 4.3 - 6.0 % Final     Comment:     Testing performed in clinic     Results for orders placed or  performed during the hospital encounter of 02/15/24   XR Chest Port 1 View    Narrative    XR CHEST PORT 1 VIEW  2/15/2024 1:15 PM     HISTORY:  dyspnea       COMPARISON:  12/11/2023 CXR    TECHNIQUE: Portable, semiupright, frontal projection radiograph of the  chest.    FINDINGS:   Implantable cardiac defibrillator projects over the left upper chest  with stable positioning of right atrial lead and right ventricular  shock coil. Mitral valve prosthesis. Postsurgical changes of the chest  with median sternotomy wires intact.    Cardiac mediastinal silhouette is stable. The trachea is midline. No  appreciable pneumothorax. Streaky opacities of the lower lungs and  perihilar regions. No focal pulmonary consolidations.     Visualized upper abdomen is unremarkable. No acute osseous  abnormalities.      Impression    IMPRESSION:  Unchanged perihilar and bibasilar streaky/patchy opacity. No  appreciable pneumothorax or acute pulmonary consolidations.    I have personally reviewed the examination and initial interpretation  and I agree with the findings.    AFIA DANIELS MD         SYSTEM ID:  [id number]   Head CT w/o contrast    Narrative    CT HEAD W/O CONTRAST 2/15/2024 2:11 PM    History: AMS     Comparison: Head CT from 9/11/2016.    Technique: Using multidetector thin collimation helical acquisition  technique, axial, coronal and sagittal CT images from the skull base  to the vertex were obtained without intravenous contrast.   (topogram) image(s) also obtained and reviewed.    Findings:   Images are degraded aided by motion artifact.  There is no intracranial hemorrhage, mass effect, or midline shift.  Gray/white matter differentiation in both cerebral hemispheres is  preserved. Ventricles are proportionate to the cerebral sulci.  Moderate generalized parenchymal volume loss. White matter hyper  attenuation, most likely represents chronic small vessel ischemic  disease. The right perirolandic chronic insult  (series 3 image 16).  The basal cisterns are clear. Arterial consultations.    The bony calvaria and the bones of the skull base are normal. The  visualized portions of the paranasal sinuses are clear. Right mastoid  and middle ear cavity effusion. Bilateral pseudophakia.      Impression    Impression:  1. No acute intracranial pathology.   2. Moderate generalized parenchymal volume loss and leukoaraiosis.   3. Right perirolandic chronic insult.  4. Right mastoid and middle ear cavity effusion.    THOMAS MCHUGH MD         SYSTEM ID:  [id number]   US Renal Complete Non-Vascular    Narrative    EXAMINATION: US RENAL COMPLETE NON-VASCULAR, 2/16/2024 8:21 AM     COMPARISON: CT abdomen and pelvis dated 12/6/2023.    HISTORY: ARSALAN    TECHNIQUE: The kidneys and bladder were scanned in the standard  fashion with specialized ultrasound transducer(s) using both gray  scale and limited color/spectral Doppler techniques.    FINDINGS:    Right kidney: Measures 11.1 cm in length. Parenchyma is of normal  thickness and echogenicity. No focal mass. No hydronephrosis.    Left kidney: Measures 10 cm in length. Parenchyma is of normal  thickness and echogenicity. 1.9 x 1.7 x 1.8 cm anechoic cyst noted in  the upper pole. No hydronephrosis.     Bladder: Decompressed by Dominguez.      Impression    IMPRESSION:  1.  No hydronephrosis.  2.  Simple left renal cyst.    DORIS BUSTAMANTE MD         SYSTEM ID:  [id number]   XR Chest Port 1 View    Narrative    Examination:  XR CHEST PORT 1 VIEW    Date:  2/18/2024 9:54 AM     Clinical Information: assess for aspiration pneumonitis     Comparison: 2/15/2024.    Findings:   Single AP chest radiograph. Stable left chest ICD. Postsurgical  changes of the chest with intact median sternotomy. Mitral valve  prosthesis. Trachea is midline. Stable cardiomediastinal silhouette.  Low lung volumes with slightly increased mixed perihilar, retrocardiac  and bibasilar opacities. No significant pleural effusion.  No  appreciable pneumothorax. Osteopenic bones. Upper abdomen is normal.      Impression    Impression:  Low lung volumes with slightly increased mixed perihilar and  retrocardiac opacities, favor edema/atelectasis.     DORIS BUSTAMANTE MD         SYSTEM ID:  [id number]   Echo Complete     Value    LVEF  45-50% (mildly reduced)    [facility]    [id number]  [id number]  [id number]  [id number]^JULIO CESAR^JENNIFER^THOMAS     [facility],[place]  Echocardiography Laboratory  [address] [place] [address]     Name: LEROY MACHADO  MRN: [id number]  : 1935  Study Date: 02/15/2024 02:55 PM  Age: 88 yrs  Gender: Male  Patient Location: [facility]  Reason For Study: Acute Myocardial Infarction  Ordering Physician: JENNIFER HARRELL  Performed By: Jessica Cedeno     BSA: 1.8 m2  Height: 67 in  Weight: 155 lb  HR: 62  BP: 126/80 mmHg  ______________________________________________________________________________  Procedure  Complete Portable Echo Adult. Contrast Optison. Optison (NDC #2605-6314-20)  given intravenously. Patient was given 6 ml mixture of 3 ml Optison and 6 ml  saline. 3 ml wasted.  ______________________________________________________________________________  Interpretation Summary  Left ventricular cavity size is small. Left ventricular function is decreased.  The ejection fraction is 45-50% (mildly reduced).  Global right ventricular function is mildly reduced.  An annuloplasty ring is noted in the mitral position. There i no stenosis or  regurgitation.  Estimated mean right atrial pressure is normal.  No pericardial effusion is present.     ______________________________________________________________________________  Left Ventricle  Left ventricular cavity size is small. Mild to moderate concentric wall  thickening consistent with left ventricular hypertrophy is present. Left  ventricular function is decreased. The ejection fraction is 45-50% (mildly  reduced).     Right  Ventricle  The right ventricle is normal size. Global right ventricular function is  mildly reduced. A pacemaker lead is noted in the right ventricle.     Atria  The right atria appears normal. Mild left atrial enlargement is present.     Mitral Valve  An annuloplasty ring is noted in the mitral position. The mean gradient across  the mitral valve is 2 mmHg.     Aortic Valve  The aortic valve is tricuspid. Mild aortic valve calcification is present.     Tricuspid Valve  Mild tricuspid insufficiency is present. The right ventricular systolic  pressure is approximated at 23.0 mmHg plus the right atrial pressure.  Pulmonary artery systolic pressure is normal.     Pulmonic Valve  On Doppler interrogation, there is no significant stenosis or regurgitation.     Vessels  The aorta root is normal. The inferior vena cava was normal in size with  preserved respiratory variability. Estimated mean right atrial pressure is  normal.     Pericardium  No pericardial effusion is present.     ______________________________________________________________________________  MMode/2D Measurements & Calculations  IVSd: 1.4 cm  LVIDd: 4.1 cm  LVIDs: 2.8 cm  LVPWd: 1.3 cm  FS: 31.7 %     LV mass(C)d: 203.9 grams  LV mass(C)dI: 112.4 grams/m2  Ao root diam: 3.4 cm  asc Aorta Diam: 3.1 cm  LVOT diam: 2.3 cm  LVOT area: 4.1 cm2  Ao root diam index Ht(cm/m): 2.0  Ao root diam index BSA (cm/m2): 1.9  Asc Ao diam index BSA (cm/m2): 1.7  Asc Ao diam index Ht(cm/m): 1.8  LA Volume (BP): 71.8 ml  LA Volume Index (BP): 39.7 ml/m2     RWT: 0.61     Doppler Measurements & Calculations  MV E max katy: 69.7 cm/sec  MV A max katy: 111.1 cm/sec  MV E/A: 0.63  MV dec slope: 253.8 cm/sec2  MV dec time: 0.27 sec  Ao V2 max: 111.0 cm/sec  Ao max P.9 mmHg  Ao V2 mean: 61.0 cm/sec  Ao mean P.7 mmHg  Ao V2 VTI: 18.0 cm  MARIA VICTORIA(I,D): 4.5 cm2  MARIA VICTORIA(V,D): 3.6 cm2  LV V1 max PG: 3.8 mmHg  LV V1 max: 97.5 cm/sec  LV V1 VTI: 19.6 cm  SV(LVOT): 80.6 ml  SI(LVOT): 44.4  ml/m2  PA acc time: 0.09 sec  TR max gabe: 240.0 cm/sec  TR max P.0 mmHg  AV Gabe Ratio (DI): 0.88  MARIA VICTORIA Index (cm2/m2): 2.5  E/E' avg: 10.0  Lateral E/e': 9.4  Medial E/e': 10.7     ______________________________________________________________________________  Report approved by: Evelio Delaney 02/15/2024 05:24 PM           *Note: Due to a large number of results and/or encounters for the requested time period, some results have not been displayed. A complete set of results can be found in Results Review.

## 2024-02-18 NOTE — PLAN OF CARE
Goal Outcome Evaluation:    Changes this shift: Pt had improved cognition and alertness throughout the shift with better conversational speech however confused. At 3am pt had onset of crackles with a wet non productive cough. Hospitalist was paged and D5w was held. Lung sounds improved rapidly with the hold on fluid.      Neuro: Pt is alert and oriented to self only. RASS 1-0. Pt is now speaking sentences of coherent speech and able to follow instruction well.   Cardiac: Pt had multiple moments of changed ECG rhythm where t waves inverted. Hospitalist informed of this. ICD pacer may be cause of changing rhythm per hospitalist.    Respiratory: Pt generally shallow unlabored breathing rapidly changed to labored with crackles at 3 am, which reverted back to shallow with no crackles in the span of a hours   GI/:  Pt voiding well using primo fit. Pt received suppository with out result but digital simulation done with a small amount of bowel removal.   Diet/appetite: Pt is NPO  Activity: bed bound at this time   Pain: Pt does not report pain but shows physical signs and symptoms so fentanyl and tylenol sup administered.   Skin:      Plan:    Determine need for IV fluids based on labs. Continue with POC.

## 2024-02-19 NOTE — PROGRESS NOTES
Care Management Follow Up    Length of Stay (days): 4    Expected Discharge Date: 02/17/2024     Concerns to be Addressed: discharge planning     Patient plan of care discussed at interdisciplinary rounds: Yes    Anticipated Discharge Disposition: Hospice     Anticipated Discharge Services: Other (see comment) (Hospice, comfort cares)  Anticipated Discharge DME: Other (see comment)    Patient/family educated on Medicare website which has current facility and service quality ratings: other (see comments) (Pt's supports in support of [facility] referral.)  Education Provided on the Discharge Plan: Yes  Patient/Family in Agreement with the Plan: yes    Referrals Placed by CM/SW:  None  Private pay costs discussed: Not applicable    Additional Information:  SW met with Pt and  [facility] staff. SW printed HCD and provided Pt's SSN at [facility]'s request as it saved Pt's spouse a trip home.  SW will continue to follow and offer support.    Samuel Joy, LICSW  [facility]   Ph: [phone]  Pager: [phone]

## 2024-02-19 NOTE — CONSULTS
SPIRITUAL HEALTH SERVICES - Consult Note [facility] ([facility]) 10[facility]     Referral Source/Reason for Visit:  Family request for [facility] support      Summary and Recommendations -  *   Sang, Sha's son, was visiting and invited me in to pray (Sang is not [employer])  Prayer with/for Sha  Explained to Sang [facility] will continue to be available on request         Trena Roblero   Chaplain Resident  Pager [phone]    [facility] available 24/7 for emergent requests/referrals, either by paging the on-call  or by entering an ASAP/STAT consult in [facility], which will also page the on-call .         * [facility] remains available 24/7 for emergent requests/referrals, either by having the switchboard page the on-call  or by entering an ASAP/STAT consult in Epic (this will also page the on-call ). Routine Epic consults receive an initial respon

## 2024-02-19 NOTE — PROGRESS NOTES
[facility]    Medicine Progress Note - Hospitalist Service, GOLD TEAM 19    Date of Admission:  2/15/2024    Assessment & Plan   Noe Florence is a 88 year old male with PMH notable for CKD3 (baseline Cr ~1.4-1.8), Afib, CAD with past CABG and stents, ischemic cardiomyopathy, polymorphic V-tach s/p ICD, dementia, monoclonal gammopathy of unknown significance, polyneuropathy, L common iliac aneurysm, polymyalgia rheumatica, past colon cancer, recent treatment for L 2nd toe osteomyelitis. Admitted to [facility]- ICU for sepsis, AMS, ARSALAN, and profound hypernatremia in the setting of failure to thrive.     Severe oropharyngeal dysphagia, profound hypernatremia, ARSALAN with uremia, severe malnutrition, metabolic encephalopathy with baseline dementia:   Failure to thrive with progressive decline over the past few years, accelerated over the past few months.  Patient has been living with and cared for by his significant other Rica, with progressive decline upon returning home from the hospital when he was treated for osteomyelitis of the toe 12/2023.  He has required a Lupillo lift and in-home help twice a day to assist with basic cares.  More recently Rica and patient's son See note that he has had increasing difficulty swallowing, pocketing food and not able to tolerate oral nutritional or liquid intake.  He become progressively weak and bedridden.  Initial labs upon admission to the hospital 2/15/2024 significant for sodium 164, , bicarb 18, creatinine 2.73, with baseline creatinine 1.4-1.8.  , troponin 140 with subsequent downward trend, elevated BNP, WBC 21, hemoglobin 10.3.  Head CT showed old ischemic changes and moderate generalized volume loss with leukoaraiosis.  CXR stable with perihilar and bibasilar streaky/patchy opacities.  1 of 2 blood cultures positive for Staph epidermidis and Enterococcus faecalis.  Repeat blood cultures 2/16/2024 have  been negative to date.  History of recently treated left toe osteomyelitis, had been seen by infectious disease 1/31/2024 following an extended course of antibiotics, and antibiotics have been stopped at that time.  The patient was admitted initially to the ICU, treated with hypotonic IV fluids for severe hypernatremia, ARSALAN, supplemental oxygen and empiric IV antibiotics with Zosyn and vancomycin.  Infectious disease was consulted.  Hypernatremia, ARSALAN subsequently improved with hypotonic fluids, with some transient improvement in encephalopathy, but continued severe dysphagia for which he was assessed by ST and unable to manage his own secretions let alone oral intake.  Subsequently developed worsening volume overload with CHF, confirmed by markedly elevated BNP in the 20,000's, CXR 2/18 showing vascular congestion despite sodium being still slightly elevated at 147, necessitating discontinuation of hypotonic IV fluids.    Palliative care has been following in the hospital and met with patient's significant other Rica and patient's son See on 2/16, with goals of care discussions including DNR/DNI and consideration of hospice.    On 2/18 patient is once again very lethargic, attempt some verbalization but incoherent, but does appear comfortable and oxygenating adequately on room air despite volume overload, with head of bed elevated.  Per RN, has been tolerating IV fentanyl for his chronic pain, previous use of Dilaudid resulted in recurrent hypotension.    Met with patient's significant other Rica, and his son See for an extended family conference on 2/18 to review clinical course and goals of care.  Rica notes that his quality of life has been very poor for the past year, particularly since December following his hospitalization for osteomyelitis.  She has been caring for him at home with the help of aides in the morning and in the evening.  He has required a Lupillo lift, had previously been  "using a Nicci steady at home prior to that hospitalization.  His hospital bed is in the living room.  Rica and See have noticed increased difficulty swallowing over the past few weeks with him pocketing food and taking very little oral intake.  See agrees that his father's quality of life \"is poor\".  See feels that his father's progressive dementia over the past few years has contributed to his decline.  We discussed the competing ongoing issue of worsening CHF despite persistent hypernatremia, he has persistent severe dysphagia despite improvement in his hypernatremia, his baseline poor quality of life and the unlikelihood that we would experience any meaningful recovery with ongoing aggressive medical care.  Rica and See both expressed a desire to transition care focus to comfort and initiate hospice care.  Rica notes that the increased expenses of caring for him over the past few years have drastically depleted their finances, and they would hope that he could receive hospice care onsite.  We discussed immediate care plans, they both agreed with discontinuing IV fluids, antibiotics, lab tests, Coumadin, other noncomfort related interventions and transition to comfort care.  He is currently tolerating IV fentanyl and it was agreed to continue this for his acute and chronic pain needs.  I subsequently spoke with Dr. Winston of palliative care, who recommended general inpatient hospice consultation given the above.  New orders placed 2/18:  -Discontinued IV fluids, lab draws, antibiotics, Coumadin, Accu-Cheks  -Comfort care order set placed, continue IV fentanyl for pain as he is tolerating this thus far better than he had Dilaudid  -General inpatient hospice consult placed, will be arranging meeting with son and S.O. Rica  -add ativan SL prn     CAD, ICM, H/O Vtach s/p ICD, H/O Afib, Acute on chronic systolic CHF:  ECHO 2/15/2024: EF 45-50%, previous EF 55-60% 1/30/2017.  Had " elevated troponin on admission, with subsequent trend down.  EKG V paced.  Required hypotonic IV fluids for severe hypernatremia, ARSALAN with uremia at the time of admission, but subsequently developed worsening volume overload, marked increase in BNP with vascular congestion on chest x-ray despite persistent, albeit improved hypernatremia making volume management extremely tenuous.  He currently appears to be tolerating volume overload well from a respiratory standpoint, saturating 98% on room air.  Receiving fentanyl for pain, will which should help with dyspnea.  As discussed, family conference held with patient's significant other Rica and his son See on 2/18.  -discontinue IV fluids, Coumadin and further lab draws.  Transition to comfort cares with hospice.    CKD, stage III with ARSALAN:  Baseline creatinine 1.4-1.8.  Initial labs on 2/15 notable for sodium 164, bicarb 18, , creatinine 2.73 in the setting of very poor oral nutritional and fluid intake at home.  Improved with hypotonic IV fluids, but now volume overloaded with vascular congestion/CHF on chest x-ray and markedly elevated BNP.  This despite sodium still being mildly elevated at 147.  Now transitioning to comfort care with hospice.  -Discontinue hypotonic fluids, discontinue lab draws  -Comfort focused care    Sepsis with history of osteomyelitis of the left second toe:  Had been hospitalized 12/2023 for osteomyelitis of the left second toe.  Received an extended 7.5 week course of antibiotics.  Had follow-up outpatient with infectious disease 1/31/2024, antibiotics discontinued.  At hospital admission noted to have elevated CRP, leukocytosis in the setting of ARSALAN, severe hypernatremia and uremia.  1 of 2 blood cultures 2/15 grew Staph epidermidis and Enterococcus faecalis.  Treated with IV Zosyn and vancomycin initially.  Subsequent blood culture 2/16 has been negative to date.  Leukocytosis resolving.  Has ongoing left toe and foot  wounds as well as multiple other pressure sores for which wound care nursing is following.  Infectious disease has been following in the hospital.  Unfortunately patient's baseline functional status very poor with progressive decline over the past few years, accelerated over the past few months.  Although labs and hemodynamics have improved with IV fluids, he remains very lethargic, unable to tolerate oral intake due to severe dysphagia.  Now on comfort care.    -Discussed with family, they were in agreement with discontinuing antibiotics and further lab draws.  -ICD deactivated    Chronic anemia, thrombocytopenia, MGUS:  Transfused 1 unit for hemoglobin 6.9 on 2/17, subsequent worsening volume overload.  CBC stable 2/18/2024.  Now transitioning to comfort cares.  -no further lab draws    History of DVT:  Transitioned to hospice, comfort cares.  Discontinued Coumadin, INR draws.    Lymphedema:  Symptomatic management at this point.    Chronic left foot, right lateral malleoli are wounds with history of left second toe osteomyelitis, recently treated with new/more recent onset pressure ulcers:  As above, discontinued antibiotics, lab draws.  Continue current wound cares, optimize comfort.  Fentanyl as needed for pain.    History of left common iliac artery aneurysm:      Diet: NPO for Medical/Clinical Reasons Except for: Meds    DVT Prophylaxis: had been on coumadin, transitioned to comfort cares 2/18/24, coumadin stopped.   Dominguez Catheter: Not present  Lines: None     Cardiac Monitoring: None  Code Status: No CPR- Do NOT Intubate      Clinically Significant Risk Factors        # Hypokalemia: Lowest K = 3.3 mmol/L in last 2 days, will replace as needed  # Hypernatremia: Highest Na = 148 mmol/L in last 2 days, will monitor as appropriate      # Hypoalbuminemia: Lowest albumin = 2.9 g/dL at 2/16/2024  1:19 AM, will monitor as appropriate    # Coagulation Defect: INR = 2.36 (Ref range: 0.85 - 1.15) and/or PTT = 33  "Seconds (Ref range: 22 - 38 Seconds), will monitor for bleeding    # Hypertension: Noted on problem list         # Moderate Malnutrition: based on nutrition assessment, PRESENT ON ADMISSION   # Financial/Environmental Concerns: none   # ICD device present       Disposition Plan            Marcos Blount MD  Hospitalist Service, GOLD TEAM 19  [facility]  Securely message with [employer] (more info)  Text page via [facility] Paging/Directory   See signed in provider for up to date coverage information  ______________________________________________________________________    Interval History   Son See at bedside.  Feels his father appears comfortable, some verbalization still.  Secretions well-controlled.  Receiving fentanyl as needed for pain.  Per RN, symptom management well-controlled.    Physical Exam   Vital Signs:               O2 Device: Oxymask Oxygen Delivery: 4 LPM  Weight: 150 lbs 5.66 oz  General: Lethargic, sitting with HOB at 70 degrees.  Some attempts at speech \"I feel little rough\", but appears comfortable.  HEENT: Slight disconjugate gaze. OP dry.  Neck supple, nontender.  Chest: Bilateral coarse rales lower lung fields.  No rhonchi or wheezes.  CV: RRR.  No audible murmurs.  Defibrillator device palpable in the left upper chest.  Abdomen: NABS.  Soft, nondistended.  No tenderness evident with deep palpation.  Extremities, skin: 2+ right greater than left arm and sacral edema.  Left foot wrapped in Kerlix.  Right thigh wound, right lateral malleolus wound dressings intact.  Right arm wrapped in Kerlix.    45 MINUTES SPENT BY ME on the date of service doing chart review, history, exam, documentation & further activities per the note.      Data      CMP  Recent Labs   Lab 02/18/24  1207 02/18/24  1159 02/18/24  0845 02/18/24  0553 02/18/24  0423 02/18/24  0032 02/17/24  1956 02/17/24  1614 02/17/24  1350 02/17/24  0424 02/17/24  0420 02/17/24  0009 " 02/16/24  0746 02/16/24  0526 02/16/24  0328 02/16/24  0119 02/15/24  1618 02/15/24  1250   NA  --  147*  --  147*  147*  --   --  148*  148*  --  151*  --  152*  152* 151*   < > 167*  162*  --  161*   < > 164*   POTASSIUM  --   --   --  3.3*  --   --  3.4  --   --   --  3.8 3.2*   < > 3.5  --  3.6   < > 4.5   CHLORIDE  --   --   --  121*  --   --  121*  --   --   --  126* 125*   < > 133*  --  131*   < > 130*   CO2  --   --   --  16*  --   --  18*  --   --   --  17* 18*   < > 18*  --  18*   < > 18*   ANIONGAP  --   --   --  10  --   --  9  --   --   --  9 8   < > 11  --  12   < > 16*   GLC 88  --  101* 96 101*   < > 135*   < >  --    < > 97 140*   < > 108*   < > 136*   < > 140*   BUN  --   --   --  36.1*  --   --  44.5*  --   --   --  62.2* 66.5*   < > 92.4*  --  95.1*   < > 107.0*   CR  --   --   --  1.18*  --   --  1.31*  --   --   --  1.60* 1.66*   < > 2.15*  --  2.39*   < > 2.73*   GFRESTIMATED  --   --   --  59*  --   --  52*  --   --   --  41* 39*   < > 29*  --  25*   < > 22*   KEITH  --   --   --  7.8*  --   --  7.8*  --   --   --  7.9* 7.9*   < > 8.0*  --  8.5*   < > 9.8   MAG  --   --   --   --   --   --  1.8  --   --   --  1.9  --   --  2.6*  --  2.4*  --   --    PHOS  --   --   --   --   --   --   --   --   --   --  1.6*  --   --  3.1  --  3.3  --   --    PROTTOTAL  --   --   --   --   --   --   --   --   --   --   --   --   --   --   --  5.8*  --  6.5   ALBUMIN  --   --   --   --   --   --   --   --   --   --   --   --   --   --   --  2.9*  --  3.3*   BILITOTAL  --   --   --   --   --   --   --   --   --   --   --   --   --   --   --  0.4  --  0.3   ALKPHOS  --   --   --   --   --   --   --   --   --   --   --   --   --   --   --  52  --  57   AST  --   --   --   --   --   --   --   --   --   --   --   --   --   --   --  35  --  28   ALT  --   --   --   --   --   --   --   --   --   --   --   --   --   --   --  18  --  15    < > = values in this interval not displayed.     [facility]  Recent Labs   Lab  02/18/24  0553 02/17/24  0420 02/16/24  0526 02/16/24  0119   WBC 11.1* 12.8* 13.4* 14.1*   RBC 2.81* 2.27* 2.43* 3.00*   HGB 8.4* 6.9* 7.5* 9.3*   HCT 27.6* 22.2* 24.3* 31.0*   MCV 98 98 100 103*   MCH 29.9 30.4 30.9 31.0   MCHC 30.4* 31.1* 30.9* 30.0*   RDW 18.0* 15.9* 16.2* 16.0*   * 126* 127* 136*     INR  Recent Labs   Lab 02/18/24  0553 02/17/24  0420 02/16/24  0526 02/15/24  1232   INR 2.36* 2.21* 1.90* 1.87*     Arterial Blood Gas  Recent Labs   Lab 02/18/24  1013 02/16/24  0526 02/16/24  0119   PH  --  7.35  --    PCO2  --  33*  --    PO2  --  101  --    HCO3  --  19*  --    O2PER 21 21 21   Venous Blood Gas  Recent Labs   Lab 02/18/24  1013 02/16/24  0526 02/16/24  0119   PHV 7.41  --  7.26*   PCO2V 30*  --  46   PO2V 40  --  21*   HCO3V 19*  --  20*   UZIEL -4.9*  --  -6.3*   O2PER 21 21 21     Hemoglobin A1C   Date Value Ref Range Status   12/08/2023 6.0 (H) <5.7 % Final     Comment:     Normal <5.7%   Prediabetes 5.7-6.4%    Diabetes 6.5% or higher     Note: Adopted from ADA consensus guidelines.   02/27/2012 5.7 4.3 - 6.0 % Final   01/27/2011 6.0 4.3 - 6.0 % Final   01/16/2009 6.2 (H) 4.3 - 6.0 % Final     Hemoglobin A1C POCT   Date Value Ref Range Status   05/05/2011 6.0 4.3 - 6.0 % Final     Comment:     Testing performed in clinic   12/21/2010 5.8 4.3 - 6.0 % Final     Comment:     Testing performed in clinic   03/15/2010 6.1 (H) 4.3 - 6.0 % Final     Comment:     Testing performed in clinic     Results for orders placed or performed during the hospital encounter of 02/15/24   XR Chest Port 1 View    Narrative    XR CHEST PORT 1 VIEW  2/15/2024 1:15 PM     HISTORY:  dyspnea       COMPARISON:  12/11/2023 CXR    TECHNIQUE: Portable, semiupright, frontal projection radiograph of the  chest.    FINDINGS:   Implantable cardiac defibrillator projects over the left upper chest  with stable positioning of right atrial lead and right ventricular  shock coil. Mitral valve prosthesis. Postsurgical changes  of the chest  with median sternotomy wires intact.    Cardiac mediastinal silhouette is stable. The trachea is midline. No  appreciable pneumothorax. Streaky opacities of the lower lungs and  perihilar regions. No focal pulmonary consolidations.     Visualized upper abdomen is unremarkable. No acute osseous  abnormalities.      Impression    IMPRESSION:  Unchanged perihilar and bibasilar streaky/patchy opacity. No  appreciable pneumothorax or acute pulmonary consolidations.    I have personally reviewed the examination and initial interpretation  and I agree with the findings.    AFIA DANIELS MD         SYSTEM ID:  [id number]   Head CT w/o contrast    Narrative    CT HEAD W/O CONTRAST 2/15/2024 2:11 PM    History: AMS     Comparison: Head CT from 9/11/2016.    Technique: Using multidetector thin collimation helical acquisition  technique, axial, coronal and sagittal CT images from the skull base  to the vertex were obtained without intravenous contrast.   (topogram) image(s) also obtained and reviewed.    Findings:   Images are degraded aided by motion artifact.  There is no intracranial hemorrhage, mass effect, or midline shift.  Gray/white matter differentiation in both cerebral hemispheres is  preserved. Ventricles are proportionate to the cerebral sulci.  Moderate generalized parenchymal volume loss. White matter hyper  attenuation, most likely represents chronic small vessel ischemic  disease. The right perirolandic chronic insult (series 3 image 16).  The basal cisterns are clear. Arterial consultations.    The bony calvaria and the bones of the skull base are normal. The  visualized portions of the paranasal sinuses are clear. Right mastoid  and middle ear cavity effusion. Bilateral pseudophakia.      Impression    Impression:  1. No acute intracranial pathology.   2. Moderate generalized parenchymal volume loss and leukoaraiosis.   3. Right perirolandic chronic insult.  4. Right mastoid and middle ear  cavity effusion.    THOMAS MCHUGH MD         SYSTEM ID:  [id number]   US Renal Complete Non-Vascular    Narrative    EXAMINATION: US RENAL COMPLETE NON-VASCULAR, 2/16/2024 8:21 AM     COMPARISON: CT abdomen and pelvis dated 12/6/2023.    HISTORY: ARSALAN    TECHNIQUE: The kidneys and bladder were scanned in the standard  fashion with specialized ultrasound transducer(s) using both gray  scale and limited color/spectral Doppler techniques.    FINDINGS:    Right kidney: Measures 11.1 cm in length. Parenchyma is of normal  thickness and echogenicity. No focal mass. No hydronephrosis.    Left kidney: Measures 10 cm in length. Parenchyma is of normal  thickness and echogenicity. 1.9 x 1.7 x 1.8 cm anechoic cyst noted in  the upper pole. No hydronephrosis.     Bladder: Decompressed by Dominguez.      Impression    IMPRESSION:  1.  No hydronephrosis.  2.  Simple left renal cyst.    DORIS BUSTAMANTE MD         SYSTEM ID:  [id number]   XR Abdomen Port 1 View    Narrative    EXAMINATION:  XR ABDOMEN PORT 1 VIEW 2/16/2024 5:39 PM     COMPARISON: 12/11/2023.    HISTORY: NG placement.    TECHNIQUE: Frontal view of the abdomen.    FINDINGS: No enteric tube is visualized. No abnormally dilated loops  of bowel. No pneumatosis or portal venous gas. Prominent infected  rectal stool. Left hip fixation hardware.       Impression    IMPRESSION: No enteric tube is visualized.     Findings were discussed with Matt Dunn RN by Dr. Hernandes at 9:10  PM on 2/16/2024. Patient does not currently have an NG tube. Recent  unsuccessful placement attempt.    I have personally reviewed the examination and initial interpretation  and I agree with the findings.    AFIA DANIELS MD         SYSTEM ID:  [id number]   XR Chest Port 1 View    Narrative    Examination:  XR CHEST PORT 1 VIEW    Date:  2/18/2024 9:54 AM     Clinical Information: assess for aspiration pneumonitis     Comparison: 2/15/2024.    Findings:   Single AP chest radiograph. Stable left chest  ICD. Postsurgical  changes of the chest with intact median sternotomy. Mitral valve  prosthesis. Trachea is midline. Stable cardiomediastinal silhouette.  Low lung volumes with slightly increased mixed perihilar, retrocardiac  and bibasilar opacities. No significant pleural effusion. No  appreciable pneumothorax. Osteopenic bones. Upper abdomen is normal.      Impression    Impression:  Low lung volumes with slightly increased mixed perihilar and  retrocardiac opacities, favor edema/atelectasis.     DORIS BUSTAMANTE MD         SYSTEM ID:  [id number]   Echo Complete     Value    LVEF  45-50% (mildly reduced)    [facility]    [id number]  [id number]  [id number]  [id number]^JULIO CESAR^JENNIFER^THOMAS     [facility],[place]  Echocardiography Laboratory  [address] [place] [id number]     Name: LEROY MACHADO  MRN: [id number]  : 1935  Study Date: 02/15/2024 02:55 PM  Age: 88 yrs  Gender: Male  Patient Location: [facility]  Reason For Study: Acute Myocardial Infarction  Ordering Physician: JENNIFER HARRELL  Performed By: Jessica Cedeno     BSA: 1.8 m2  Height: 67 in  Weight: 155 lb  HR: 62  BP: 126/80 mmHg  ______________________________________________________________________________  Procedure  Complete Portable Echo Adult. Contrast Optison. Optison (NDC #7043-9783-43)  given intravenously. Patient was given 6 ml mixture of 3 ml Optison and 6 ml  saline. 3 ml wasted.  ______________________________________________________________________________  Interpretation Summary  Left ventricular cavity size is small. Left ventricular function is decreased.  The ejection fraction is 45-50% (mildly reduced).  Global right ventricular function is mildly reduced.  An annuloplasty ring is noted in the mitral position. There i no stenosis or  regurgitation.  Estimated mean right atrial pressure is normal.  No pericardial effusion is present.      ______________________________________________________________________________  Left Ventricle  Left ventricular cavity size is small. Mild to moderate concentric wall  thickening consistent with left ventricular hypertrophy is present. Left  ventricular function is decreased. The ejection fraction is 45-50% (mildly  reduced).     Right Ventricle  The right ventricle is normal size. Global right ventricular function is  mildly reduced. A pacemaker lead is noted in the right ventricle.     Atria  The right atria appears normal. Mild left atrial enlargement is present.     Mitral Valve  An annuloplasty ring is noted in the mitral position. The mean gradient across  the mitral valve is 2 mmHg.     Aortic Valve  The aortic valve is tricuspid. Mild aortic valve calcification is present.     Tricuspid Valve  Mild tricuspid insufficiency is present. The right ventricular systolic  pressure is approximated at 23.0 mmHg plus the right atrial pressure.  Pulmonary artery systolic pressure is normal.     Pulmonic Valve  On Doppler interrogation, there is no significant stenosis or regurgitation.     Vessels  The aorta root is normal. The inferior vena cava was normal in size with  preserved respiratory variability. Estimated mean right atrial pressure is  normal.     Pericardium  No pericardial effusion is present.     ______________________________________________________________________________  MMode/2D Measurements & Calculations  IVSd: 1.4 cm  LVIDd: 4.1 cm  LVIDs: 2.8 cm  LVPWd: 1.3 cm  FS: 31.7 %     LV mass(C)d: 203.9 grams  LV mass(C)dI: 112.4 grams/m2  Ao root diam: 3.4 cm  asc Aorta Diam: 3.1 cm  LVOT diam: 2.3 cm  LVOT area: 4.1 cm2  Ao root diam index Ht(cm/m): 2.0  Ao root diam index BSA (cm/m2): 1.9  Asc Ao diam index BSA (cm/m2): 1.7  Asc Ao diam index Ht(cm/m): 1.8  LA Volume (BP): 71.8 ml  LA Volume Index (BP): 39.7 ml/m2     RWT: 0.61     Doppler Measurements & Calculations  MV E max katy: 69.7 cm/sec  MV A  max gabe: 111.1 cm/sec  MV E/A: 0.63  MV dec slope: 253.8 cm/sec2  MV dec time: 0.27 sec  Ao V2 max: 111.0 cm/sec  Ao max P.9 mmHg  Ao V2 mean: 61.0 cm/sec  Ao mean P.7 mmHg  Ao V2 VTI: 18.0 cm  MARIA VICTORIA(I,D): 4.5 cm2  MARIA VICTORIA(V,D): 3.6 cm2  LV V1 max PG: 3.8 mmHg  LV V1 max: 97.5 cm/sec  LV V1 VTI: 19.6 cm  SV(LVOT): 80.6 ml  SI(LVOT): 44.4 ml/m2  PA acc time: 0.09 sec  TR max gabe: 240.0 cm/sec  TR max P.0 mmHg  AV Gabe Ratio (DI): 0.88  MARIA VICTORIA Index (cm2/m2): 2.5  E/E' avg: 10.0  Lateral E/e': 9.4  Medial E/e': 10.7     ______________________________________________________________________________  Report approved by: Evelio Delaney 02/15/2024 05:24 PM           *Note: Due to a large number of results and/or encounters for the requested time period, some results have not been displayed. A complete set of results can be found in Results Review.

## 2024-02-19 NOTE — PROGRESS NOTES
Pt in hospice care with end of life cares. Administered fentanyl 25mcg IVP x2 for pain and ativan 2mg sub lingual x1 for anxiety. Arousability and cognition is declining. [facility] intake and RN visited to begin care. See note from hospice RN on recommendations for end of life cares.     Yeimi Galan RN

## 2024-02-19 NOTE — SIGNIFICANT EVENT
SPIRITUAL HEALTH SERVICES Significant Event  Yarsani Sacrament of ANOINTING  [facility] ([place] M10    Pt anointed by Father Heidy Hale   Pager [phone]

## 2024-02-19 NOTE — CONSULTS
SPIRITUAL HEALTH SERVICES - Consult Note [facility] ([facility] [place] 10[facility]     Referral Source/Reason for Visit:  Family requesting additional spiritual support      Summary and Recommendations -  *   Sha is now actively dying.  Family, particularly his partner, is requesting services of a .  Spoke with Fr. Moody and triaged patient to him.  He will attempt to visit this afternoon.  PLAN  [facility] remains available to support patient and family.  Please place Urgent Consult Request in Epic.         Trena Roblero   Chaplain Resident  Pager [phone]    [facility] available 24/7 for emergent requests/referrals, either by paging the on-call  or by entering an ASAP/STAT consult in [employer], which will also page the on-call .         * [facility] remains available 24/7 for emergent requests/referrals, either by having the switchboard page the on-call  or by entering an ASAP/STAT consult in Epic (this will also page the on-call ). Routine Epic consults receive an initial respon

## 2024-02-19 NOTE — PROGRESS NOTES
Spoke with significant other Rica and set up an informational meeting at 12 pm today 2.19.24 - Writer called [facility] and updated nurse at nursing station. Hospice care consultant Stuart will complete this visit                     Thank you,  Belgica Silverio   [facility] Admissions Coordinator

## 2024-02-19 NOTE — PLAN OF CARE
Problem: Adult Inpatient Plan of Care  Goal: Plan of Care Review  Description: The Plan of Care Review/Shift note should be completed every shift.  The Outcome Evaluation is a brief statement about your assessment that the patient is improving, declining, or no change.  This information will be displayed automatically on your shift  note.  Outcome: Progressing  Flowsheets (Taken 2/18/2024 3190)  Outcome Evaluation: Pt changed to comfort care/hospice and all monitoring removed per family  Plan of Care Reviewed With:   significant other   child  Overall Patient Progress: improving   Goal Outcome Evaluation:      Plan of Care Reviewed With: significant other, child    Overall Patient Progress: improvingOverall Patient Progress: improving    Outcome Evaluation: Pt changed to comfort care/hospice and all monitoring removed per family  Pt reports better pain control today with current PRNs in place

## 2024-02-19 NOTE — PLAN OF CARE
Goal Outcome Evaluation:    Changes this shift:  Pt resting overnight with Fentanyl IV provided for pain and   Robinul iv to help decrease secretions. Atropine also provided along with oral cares and suction. Son at bedside.        Neuro: RASS -1 to -2, Pt is lethargic and confused.   Cardiac:  No longer monitoring while on comfort cares.   Respiratory:  Respirations becoming more and more labored throughout the night, pt is in a more upright position with a Oxymask running at 5 lpm.   GI/:  Pt voiding using pure wick.   Diet/appetite: NPO  Activity: bed rest  Pain: Pt receiving Fentanyl iv for pain.   Skin: multiple wounds see woc note for further information.      Plan:   Pt is planning to enter hospice in hospital if possible.

## 2024-02-20 NOTE — CONSULTS
[facility]    Consult Note - [facility]  _________________________________________________________________    [facility] 24/7 Contact Number: [phone]    - Providers: Please contact [facility] with changes in orders or clinical plan of care   - Nursing: Please contact [facility] with significant changes in patient condition    Hospice will notify the care team (including the hospitalist) to confirm date of inpatient hospice (GIP) admission.    New Epic encounter will not be created until hospice completes admission.   ______________________________________________________________________        Hospice Diagnosis: sepsis    Indication for Inpatient Hospice: pain, dyspnea    Goals for Hospital Discharge: <3 PRN's in 24 hours for 48 hours and hemodynamically stable for discharge.    Plan of Care Discussed with the Following:   - Nurse: BYRON Cline  - Hospitalist/Rounding Provider: Dr. Ibarra  - Noe's Family/Preferred Contact: partnerRica  - Hospice Provider: Dr. Landrum    Summary of Visit (includes assessment, medications and any new orders):   Patient in bed, tense, clenched jaw, grimacing adult non-verbal pain assessment 8/10. Educated family on hospice medications for pain, anxiety and dyspnea and non-verbal signs of discomfort. PRN ativan administered by bedside nurse to assist with discomfort.     New orders:  Fentanyl 25mcg Q3hrs IV scheduled  Lorazepam 1mg QID IV scheduled        Charlotte Verderame    Time: 2:47 AM EST  Date of encounter:  12/29/2022  Independent Historian/Clinical History and Information was obtained by:   Patient  Chief Complaint: Right arm pain and numbness    History is limited by: N/A    History of Present Illness:  Patient is a 27 y.o. year old female who presents to the emergency department for evaluation of right arm pain and numbness      History provided by:  Patient  Hand Pain  Location:  Right  Quality:  Burning and stinging  Severity:  Moderate  Onset quality:  Gradual  Duration:  1 day  Timing:  Constant  Progression:  Unchanged  Chronicity:  New  Context:  Patient states she woke up and was having pain from the tip of her fingertips to shoulder.  No known cause.  Had been typing and writing a lot prior to going to sleep.  History of carpal tunnel before  Relieved by:  Nothing  Worsened by:  Nothing  Ineffective treatments:  None tried  Associated symptoms: no abdominal pain, no chest pain, no fever, no headaches and no rash        Patient Care Team  Primary Care Provider: Ai Arrington APRN    Past Medical History:     Allergies   Allergen Reactions   • Sulfa Antibiotics Hives   • Sulfamethoxazole Hives   • Sulfamethoxazole-Trimethoprim Hives   • Sulfasalazine Hives   • Sulfonylureas Hives   • Sulfacetamide Sodium Unknown - Low Severity     Past Medical History:   Diagnosis Date   • Allergic    • Allergies    • Prediabetes      Past Surgical History:   Procedure Laterality Date   • ADENOIDECTOMY     • CARPAL TUNNEL RELEASE     • TONSILLECTOMY     • TYMPANOSTOMY TUBE PLACEMENT      x2     Family History   Problem Relation Age of Onset   • Hypertension Mother    • Diabetes Mother    • Hypertension Father    • Diabetes Father    • Diabetes Maternal Grandmother    • Diabetes Paternal Grandfather        Home Medications:  Prior to Admission medications    Medication Sig Start Date End Date Taking? Authorizing Provider   brompheniramine-pseudoephedrine-DM (Bromfed DM) 30-2-10 MG/5ML  syrup Take 10 mL by mouth 4 (Four) Times a Day As Needed for Congestion, Cough or Allergies. 12/16/22   Cuca Grajeda, MARIA E        Social History:   Social History     Tobacco Use   • Smoking status: Never   • Smokeless tobacco: Never   Vaping Use   • Vaping Use: Never used   Substance Use Topics   • Alcohol use: Not Currently   • Drug use: Never         Review of Systems:  Review of Systems   Constitutional: Negative for fever.   Cardiovascular: Negative for chest pain and palpitations.   Gastrointestinal: Negative for abdominal pain.   Musculoskeletal: Negative for back pain and neck pain.   Skin: Negative for rash.   Neurological: Positive for numbness ( Entire hand from fingertips to shoulder). Negative for speech difficulty, weakness and headaches.   Hematological: Negative for adenopathy. Does not bruise/bleed easily.   Psychiatric/Behavioral: Negative.    All other systems reviewed and are negative.       Physical Exam:  /71   Pulse 92   Temp 98.1 °F (36.7 °C)   Resp 14   Ht 170.2 cm (67\")   Wt (!) 156 kg (344 lb 12.8 oz)   SpO2 98%   BMI 54.00 kg/m²     Physical Exam  Vitals and nursing note reviewed.   Constitutional:       Appearance: Normal appearance.   HENT:      Head: Atraumatic.      Nose: Nose normal.      Mouth/Throat:      Mouth: Mucous membranes are moist.   Eyes:      Conjunctiva/sclera: Conjunctivae normal.   Cardiovascular:      Pulses: Normal pulses.   Pulmonary:      Effort: Pulmonary effort is normal.   Musculoskeletal:         General: Tenderness ( Patient reports tenderness with any palpation from superior aspect of deltoid all the way down to fingertips) present. No swelling. Normal range of motion.      Cervical back: Normal range of motion. No tenderness.   Skin:     General: Skin is warm and dry.      Capillary Refill: Capillary refill takes less than 2 seconds.      Findings: Bruising ( faint Small bruise at base of fourth and fifth metatarsal ) present.    Neurological:      Mental Status: She is alert.      Sensory: Sensory deficit ( Patient reports altered light touch on entire right arm) present.      Motor: No weakness.   Psychiatric:         Mood and Affect: Mood normal.         Behavior: Behavior normal.                  Procedures:  Procedures      Medical Decision Making:      Comorbidities that affect care:    Prediabetes and history of carpal tunnel    External Notes reviewed:    None      The following orders were placed and all results were independently analyzed by me:  Orders Placed This Encounter   Procedures   • XR Hand 3+ View Right       Medications Given in the Emergency Department:  Medications   ketorolac (TORADOL) injection 60 mg (60 mg Intramuscular Given 12/29/22 0318)        ED Course:         Labs:    Lab Results (last 24 hours)     ** No results found for the last 24 hours. **           Imaging:    XR Hand 3+ View Right    Result Date: 12/29/2022  PROCEDURE: XR HAND 3+ VW RIGHT  COMPARISON: None  INDICATIONS: GENERAL RIGHT HAND PAIN, ALL OVER. NO INJURY, BRUISING TO POSTERIOR RIGHT HAND  FINDINGS:  There is no acute fracture or dislocation. Joint spaces appear normal. No erosions. Soft tissues are unremarkable.        No acute osseous abnormality or significant degenerative change.       COOPER GUPTA MD       Electronically Signed and Approved By: COOPER GUPTA MD on 12/29/2022 at 3:23                 Differential Diagnosis and Discussion:    Extremity Pain: Differential diagnosis includes but is not limited to soft tissue sprain, tendonitis, tendon injury, dislocation, fracture, deep vein thrombosis, arterial insufficiency, osteoarthritis, bursitis, and ligamentous damage.    All X-rays were independently reviewed by me.    MDM  Number of Diagnoses or Management Options  Bruise  Paresthesia of right arm  Radicular pain in right arm  Diagnosis management comments: I have explained the patient´s condition, diagnoses and treatment plan  based on the information available to me at this time. I have answered questions and addressed any concerns. The patient has a good  understanding of the patient´s diagnosis, condition, and treatment plan as can be expected at this point. The vital signs have been stable. The patient´s condition is stable and appropriate for discharge from the emergency department.      The patient will pursue further outpatient evaluation with the primary care physician or other designated or consulting physician as outlined in the discharge instructions. They are agreeable to this plan of care and follow-up instructions have been explained in detail. The patient has received these instructions in written format and have expressed an understanding of the discharge instructions. The patient is aware that any significant change in condition or worsening of symptoms should prompt an immediate return to this or the closest emergency department or call to 911.         Amount and/or Complexity of Data Reviewed  Tests in the radiology section of CPT®: reviewed and ordered  Tests in the medicine section of CPT®: ordered and reviewed    Risk of Complications, Morbidity, and/or Mortality  Presenting problems: low  Diagnostic procedures: low  Management options: low    Patient Progress  Patient progress: stable       Patient Care Considerations:    None      Consultants/Shared Management Plan:    None    Social Determinants of Health:    Patient is independent, reliable, and has access to care.       Disposition and Care Coordination:    Discharged: The patient is suitable and stable for discharge with no need for consideration of observation or admission.    I have explained the patient´s condition, diagnoses and treatment plan based on the information available to me at this time. I have answered questions and addressed any concerns. The patient has a good  understanding of the patient´s diagnosis, condition, and treatment plan as can be  expected at this point. The vital signs have been stable. The patient´s condition is stable and appropriate for discharge from the emergency department.      The patient will pursue further outpatient evaluation with the primary care physician or other designated or consulting physician as outlined in the discharge instructions. They are agreeable to this plan of care and follow-up instructions have been explained in detail. The patient has received these instructions in written format and have expressed an understanding of the discharge instructions. The patient is aware that any significant change in condition or worsening of symptoms should prompt an immediate return to this or the closest emergency department or call to 911.    Final diagnoses:   Radicular pain in right arm   Bruise   Paresthesia of right arm        ED Disposition     ED Disposition   Discharge    Condition   Stable    Comment   --             This medical record created using voice recognition software.           Anastasiia Yu, APRN  12/29/22 0333

## 2024-02-20 NOTE — PROGRESS NOTES
[facility]    Medicine Progress Note - Hospitalist Service, GOLD TEAM 19    Date of Admission:  2/15/2024    Assessment & Plan     Noe Florence is a 88 year old male with PMH notable for CKD3 (baseline Cr ~1.4-1.8), Afib, CAD with past CABG and stents, ischemic cardiomyopathy, polymorphic V-tach s/p ICD, dementia, monoclonal gammopathy of unknown significance, polyneuropathy, L common iliac aneurysm, polymyalgia rheumatica, past colon cancer, recent treatment for L 2nd toe osteomyelitis. Admitted to [facility] ICU for sepsis, AMS, ARSALAN, and profound hypernatremia in the setting of failure to thrive.         Today's changes: 2/20/2024  Patient in comfort care. Currently appears resting comfortably.   Family at bedside.   No acute concern.  No new changes  Appreciate palliative consult, following.       Severe oropharyngeal dysphagia, profound hypernatremia, ARSALAN with uremia, severe malnutrition, metabolic encephalopathy with baseline dementia:   Failure to thrive with progressive decline over the past few years, accelerated over the past few months.  Patient has been living with and cared for by his significant other Rica, with progressive decline upon returning home from the hospital when he was treated for osteomyelitis of the toe 12/2023.  He has required a Lupillo lift and in-home help twice a day to assist with basic cares.  More recently Rica and patient's son See note that he has had increasing difficulty swallowing, pocketing food and not able to tolerate oral nutritional or liquid intake.  He become progressively weak and bedridden.  Initial labs upon admission to the hospital 2/15/2024 significant for sodium 164, , bicarb 18, creatinine 2.73, with baseline creatinine 1.4-1.8.  , troponin 140 with subsequent downward trend, elevated BNP, WBC 21, hemoglobin 10.3.  Head CT showed old ischemic changes and moderate generalized volume loss with  leukoaraiosis.  CXR stable with perihilar and bibasilar streaky/patchy opacities.  1 of 2 blood cultures positive for Staph epidermidis and Enterococcus faecalis.  Repeat blood cultures 2/16/2024 have been negative to date.  History of recently treated left toe osteomyelitis, had been seen by infectious disease 1/31/2024 following an extended course of antibiotics, and antibiotics have been stopped at that time.  The patient was admitted initially to the ICU, treated with hypotonic IV fluids for severe hypernatremia, ARSALAN, supplemental oxygen and empiric IV antibiotics with Zosyn and vancomycin.  Infectious disease was consulted.  Hypernatremia, ARSALAN subsequently improved with hypotonic fluids, with some transient improvement in encephalopathy, but continued severe dysphagia for which he was assessed by ST and unable to manage his own secretions let alone oral intake.  Subsequently developed worsening volume overload with CHF, confirmed by markedly elevated BNP in the 20,000's, CXR 2/18 showing vascular congestion despite sodium being still slightly elevated at 147, necessitating discontinuation of hypotonic IV fluids.    Palliative care has been following in the hospital and met with patient's significant other Rica and patient's son See on 2/16, with goals of care discussions including DNR/DNI and consideration of hospice.    On 2/18 patient is once again very lethargic, attempt some verbalization but incoherent, but does appear comfortable and oxygenating adequately on room air despite volume overload, with head of bed elevated.  Per RN, has been tolerating IV fentanyl for his chronic pain, previous use of Dilaudid resulted in recurrent hypotension.    Met with patient's significant other Rica, and his son See for an extended family conference on 2/18 to review clinical course and goals of care.  Rica notes that his quality of life has been very poor for the past year, particularly since  "December following his hospitalization for osteomyelitis.  She has been caring for him at home with the help of aides in the morning and in the evening.  He has required a Lupillo lift, had previously been using a Nicci steady at home prior to that hospitalization.  His hospital bed is in the living room.  Rica and See have noticed increased difficulty swallowing over the past few weeks with him pocketing food and taking very little oral intake.  See agrees that his father's quality of life \"is poor\".  See feels that his father's progressive dementia over the past few years has contributed to his decline.  We discussed the competing ongoing issue of worsening CHF despite persistent hypernatremia, he has persistent severe dysphagia despite improvement in his hypernatremia, his baseline poor quality of life and the unlikelihood that we would experience any meaningful recovery with ongoing aggressive medical care.  Rica and See both expressed a desire to transition care focus to comfort and initiate hospice care.  Rica notes that the increased expenses of caring for him over the past few years have drastically depleted their finances, and they would hope that he could receive hospice care onsite.  We discussed immediate care plans, they both agreed with discontinuing IV fluids, antibiotics, lab tests, Coumadin, other noncomfort related interventions and transition to comfort care.  He is currently tolerating IV fentanyl and it was agreed to continue this for his acute and chronic pain needs.  I subsequently spoke with Dr. Winston of palliative care, who recommended general inpatient hospice consultation given the above.  New orders placed 2/18:  -Discontinued IV fluids, lab draws, antibiotics, Coumadin, Accu-Cheks  -Comfort care order set placed, continue IV fentanyl for pain as he is tolerating this thus far better than he had Dilaudid  -General inpatient hospice consult placed, will " be arranging meeting with son and S.O. Rica  -add ativan SL prn     CAD, ICM, H/O Vtach s/p ICD, H/O Afib, Acute on chronic systolic CHF:  ECHO 2/15/2024: EF 45-50%, previous EF 55-60% 1/30/2017.  Had elevated troponin on admission, with subsequent trend down.  EKG V paced.  Required hypotonic IV fluids for severe hypernatremia, ARSALAN with uremia at the time of admission, but subsequently developed worsening volume overload, marked increase in BNP with vascular congestion on chest x-ray despite persistent, albeit improved hypernatremia making volume management extremely tenuous.  He currently appears to be tolerating volume overload well from a respiratory standpoint, saturating 98% on room air.  Receiving fentanyl for pain, will which should help with dyspnea.  As discussed, family conference held with patient's significant other Rica and his son See on 2/18.  -discontinue IV fluids, Coumadin and further lab draws.  Transition to comfort cares with hospice.    CKD, stage III with ARSALAN:  Baseline creatinine 1.4-1.8.  Initial labs on 2/15 notable for sodium 164, bicarb 18, , creatinine 2.73 in the setting of very poor oral nutritional and fluid intake at home.  Improved with hypotonic IV fluids, but now volume overloaded with vascular congestion/CHF on chest x-ray and markedly elevated BNP.  This despite sodium still being mildly elevated at 147.  Now transitioning to comfort care with hospice.  -Discontinue hypotonic fluids, discontinue lab draws  -Comfort focused care    Sepsis with history of osteomyelitis of the left second toe:  Had been hospitalized 12/2023 for osteomyelitis of the left second toe.  Received an extended 7.5 week course of antibiotics.  Had follow-up outpatient with infectious disease 1/31/2024, antibiotics discontinued.  At hospital admission noted to have elevated CRP, leukocytosis in the setting of ARSALAN, severe hypernatremia and uremia.  1 of 2 blood cultures 2/15 grew Staph  epidermidis and Enterococcus faecalis.  Treated with IV Zosyn and vancomycin initially.  Subsequent blood culture 2/16 has been negative to date.  Leukocytosis resolving.  Has ongoing left toe and foot wounds as well as multiple other pressure sores for which wound care nursing is following.  Infectious disease has been following in the hospital.  Unfortunately patient's baseline functional status very poor with progressive decline over the past few years, accelerated over the past few months.  Although labs and hemodynamics have improved with IV fluids, he remains very lethargic, unable to tolerate oral intake due to severe dysphagia.  Now on comfort care.    -Discussed with family, they were in agreement with discontinuing antibiotics and further lab draws.  -ICD deactivated    Chronic anemia, thrombocytopenia, MGUS:  Transfused 1 unit for hemoglobin 6.9 on 2/17, subsequent worsening volume overload.  CBC stable 2/18/2024.  Now transitioning to comfort cares.  -no further lab draws    History of DVT:  Transitioned to hospice, comfort cares.  Discontinued Coumadin, INR draws.    Lymphedema:  Symptomatic management at this point.    Chronic left foot, right lateral malleoli are wounds with history of left second toe osteomyelitis, recently treated with new/more recent onset pressure ulcers:  As above, discontinued antibiotics, lab draws.  Continue current wound cares, optimize comfort.  Fentanyl as needed for pain.    History of left common iliac artery aneurysm:      Diet: NPO for Medical/Clinical Reasons Except for: Meds    DVT Prophylaxis: had been on coumadin, transitioned to comfort cares 2/18/24, coumadin stopped.   Dominguez Catheter: Not present  Lines: None     Cardiac Monitoring: None  Code Status: No CPR- Do NOT Intubate      Clinically Significant Risk Factors              # Hypoalbuminemia: Lowest albumin = 2.9 g/dL at 2/16/2024  1:19 AM, will monitor as appropriate    # Coagulation Defect: INR = 2.36 (Ref  range: 0.85 - 1.15) and/or PTT = 33 Seconds (Ref range: 22 - 38 Seconds), will monitor for bleeding    # Hypertension: Noted on problem list         # Moderate Malnutrition: based on nutrition assessment    # Financial/Environmental Concerns: none   # ICD device present       Disposition Plan            Mckinley Ibarra MD  Hospitalist Service, GOLD TEAM 19  [facility]  Securely message with [employer] (more info)  Text page via [employer] Paging/Directory   See signed in provider for up to date coverage information  ______________________________________________________________________    Interval History     Family at bedside.  Patient appears comfortable, resting with eyes closed.  No acute concern for RN.     Physical Exam   Vital Signs:                    Weight: 150 lbs 5.66 oz      General Appearance: Resting comfortably with eyes closed.   Respiratory: Normal work of breathing.  Cardiovascular: not examined.   GI: not examined.   Extremities: Dependent edema present.   Neuro: sleeping      35 MINUTES SPENT BY ME on the date of service doing chart review, history, exam, documentation & further activities per the note.      Data      CMP  Recent Labs   Lab 02/18/24  1207 02/18/24  1159 02/18/24  0845 02/18/24  0553 02/18/24  0423 02/18/24  0032 02/17/24  1956 02/17/24  1614 02/17/24  1350 02/17/24  0424 02/17/24  0420 02/17/24  0009 02/16/24  0746 02/16/24  0526 02/16/24  0328 02/16/24  0119 02/15/24  1618 02/15/24  1250   NA  --  147*  --  147*  147*  --   --  148*  148*  --  151*  --  152*  152* 151*   < > 167*  162*  --  161*   < > 164*   POTASSIUM  --   --   --  3.3*  --   --  3.4  --   --   --  3.8 3.2*   < > 3.5  --  3.6   < > 4.5   CHLORIDE  --   --   --  121*  --   --  121*  --   --   --  126* 125*   < > 133*  --  131*   < > 130*   CO2  --   --   --  16*  --   --  18*  --   --   --  17* 18*   < > 18*  --  18*   < > 18*   ANIONGAP  --   --   --  10  --   --  9  --    --   --  9 8   < > 11  --  12   < > 16*   GLC 88  --  101* 96 101*   < > 135*   < >  --    < > 97 140*   < > 108*   < > 136*   < > 140*   BUN  --   --   --  36.1*  --   --  44.5*  --   --   --  62.2* 66.5*   < > 92.4*  --  95.1*   < > 107.0*   CR  --   --   --  1.18*  --   --  1.31*  --   --   --  1.60* 1.66*   < > 2.15*  --  2.39*   < > 2.73*   GFRESTIMATED  --   --   --  59*  --   --  52*  --   --   --  41* 39*   < > 29*  --  25*   < > 22*   KEITH  --   --   --  7.8*  --   --  7.8*  --   --   --  7.9* 7.9*   < > 8.0*  --  8.5*   < > 9.8   MAG  --   --   --   --   --   --  1.8  --   --   --  1.9  --   --  2.6*  --  2.4*  --   --    PHOS  --   --   --   --   --   --   --   --   --   --  1.6*  --   --  3.1  --  3.3  --   --    PROTTOTAL  --   --   --   --   --   --   --   --   --   --   --   --   --   --   --  5.8*  --  6.5   ALBUMIN  --   --   --   --   --   --   --   --   --   --   --   --   --   --   --  2.9*  --  3.3*   BILITOTAL  --   --   --   --   --   --   --   --   --   --   --   --   --   --   --  0.4  --  0.3   ALKPHOS  --   --   --   --   --   --   --   --   --   --   --   --   --   --   --  52  --  57   AST  --   --   --   --   --   --   --   --   --   --   --   --   --   --   --  35  --  28   ALT  --   --   --   --   --   --   --   --   --   --   --   --   --   --   --  18  --  15    < > = values in this interval not displayed.     CBC  Recent Labs   Lab 02/18/24  0553 02/17/24  0420 02/16/24 0526 02/16/24 0119   WBC 11.1* 12.8* 13.4* 14.1*   RBC 2.81* 2.27* 2.43* 3.00*   HGB 8.4* 6.9* 7.5* 9.3*   HCT 27.6* 22.2* 24.3* 31.0*   MCV 98 98 100 103*   MCH 29.9 30.4 30.9 31.0   MCHC 30.4* 31.1* 30.9* 30.0*   RDW 18.0* 15.9* 16.2* 16.0*   * 126* 127* 136*     INR  Recent Labs   Lab 02/18/24  0553 02/17/24  0420 02/16/24  0526 02/15/24  1232   INR 2.36* 2.21* 1.90* 1.87*     Arterial Blood Gas  Recent Labs   Lab 02/18/24  1013 02/16/24 0526 02/16/24 0119   PH  --  7.35  --    PCO2  --  33*  --     PO2  --  101  --    HCO3  --  19*  --    O2PER 21 21 21   Venous Blood Gas  Recent Labs   Lab 02/18/24  1013 02/16/24  0526 02/16/24  0119   PHV 7.41  --  7.26*   PCO2V 30*  --  46   PO2V 40  --  21*   HCO3V 19*  --  20*   UZIEL -4.9*  --  -6.3*   O2PER 21 21 21     Hemoglobin A1C   Date Value Ref Range Status   12/08/2023 6.0 (H) <5.7 % Final     Comment:     Normal <5.7%   Prediabetes 5.7-6.4%    Diabetes 6.5% or higher     Note: Adopted from ADA consensus guidelines.   02/27/2012 5.7 4.3 - 6.0 % Final   01/27/2011 6.0 4.3 - 6.0 % Final   01/16/2009 6.2 (H) 4.3 - 6.0 % Final     Hemoglobin A1C POCT   Date Value Ref Range Status   05/05/2011 6.0 4.3 - 6.0 % Final     Comment:     Testing performed in clinic   12/21/2010 5.8 4.3 - 6.0 % Final     Comment:     Testing performed in clinic   03/15/2010 6.1 (H) 4.3 - 6.0 % Final     Comment:     Testing performed in clinic     Results for orders placed or performed during the hospital encounter of 02/15/24   XR Chest Port 1 View    Narrative    XR CHEST PORT 1 VIEW  2/15/2024 1:15 PM     HISTORY:  dyspnea       COMPARISON:  12/11/2023 CXR    TECHNIQUE: Portable, semiupright, frontal projection radiograph of the  chest.    FINDINGS:   Implantable cardiac defibrillator projects over the left upper chest  with stable positioning of right atrial lead and right ventricular  shock coil. Mitral valve prosthesis. Postsurgical changes of the chest  with median sternotomy wires intact.    Cardiac mediastinal silhouette is stable. The trachea is midline. No  appreciable pneumothorax. Streaky opacities of the lower lungs and  perihilar regions. No focal pulmonary consolidations.     Visualized upper abdomen is unremarkable. No acute osseous  abnormalities.      Impression    IMPRESSION:  Unchanged perihilar and bibasilar streaky/patchy opacity. No  appreciable pneumothorax or acute pulmonary consolidations.    I have personally reviewed the examination and initial  interpretation  and I agree with the findings.    AFIA DANIELS MD         SYSTEM ID:  [id number]   Head CT w/o contrast    Narrative    CT HEAD W/O CONTRAST 2/15/2024 2:11 PM    History: AMS     Comparison: Head CT from 9/11/2016.    Technique: Using multidetector thin collimation helical acquisition  technique, axial, coronal and sagittal CT images from the skull base  to the vertex were obtained without intravenous contrast.   (topogram) image(s) also obtained and reviewed.    Findings:   Images are degraded aided by motion artifact.  There is no intracranial hemorrhage, mass effect, or midline shift.  Gray/white matter differentiation in both cerebral hemispheres is  preserved. Ventricles are proportionate to the cerebral sulci.  Moderate generalized parenchymal volume loss. White matter hyper  attenuation, most likely represents chronic small vessel ischemic  disease. The right perirolandic chronic insult (series 3 image 16).  The basal cisterns are clear. Arterial consultations.    The bony calvaria and the bones of the skull base are normal. The  visualized portions of the paranasal sinuses are clear. Right mastoid  and middle ear cavity effusion. Bilateral pseudophakia.      Impression    Impression:  1. No acute intracranial pathology.   2. Moderate generalized parenchymal volume loss and leukoaraiosis.   3. Right perirolandic chronic insult.  4. Right mastoid and middle ear cavity effusion.    THOMAS MCHUGH MD         SYSTEM ID:  [id number]   US Renal Complete Non-Vascular    Narrative    EXAMINATION: US RENAL COMPLETE NON-VASCULAR, 2/16/2024 8:21 AM     COMPARISON: CT abdomen and pelvis dated 12/6/2023.    HISTORY: ARSALAN    TECHNIQUE: The kidneys and bladder were scanned in the standard  fashion with specialized ultrasound transducer(s) using both gray  scale and limited color/spectral Doppler techniques.    FINDINGS:    Right kidney: Measures 11.1 cm in length. Parenchyma is of normal  thickness and  echogenicity. No focal mass. No hydronephrosis.    Left kidney: Measures 10 cm in length. Parenchyma is of normal  thickness and echogenicity. 1.9 x 1.7 x 1.8 cm anechoic cyst noted in  the upper pole. No hydronephrosis.     Bladder: Decompressed by Dominguez.      Impression    IMPRESSION:  1.  No hydronephrosis.  2.  Simple left renal cyst.    DORIS BUSTAMANTE MD         SYSTEM ID:  [id number]   XR Abdomen Port 1 View    Narrative    EXAMINATION:  XR ABDOMEN PORT 1 VIEW 2/16/2024 5:39 PM     COMPARISON: 12/11/2023.    HISTORY: NG placement.    TECHNIQUE: Frontal view of the abdomen.    FINDINGS: No enteric tube is visualized. No abnormally dilated loops  of bowel. No pneumatosis or portal venous gas. Prominent infected  rectal stool. Left hip fixation hardware.       Impression    IMPRESSION: No enteric tube is visualized.     Findings were discussed with Matt Dunn RN by Dr. Hernandes at 9:10  PM on 2/16/2024. Patient does not currently have an NG tube. Recent  unsuccessful placement attempt.    I have personally reviewed the examination and initial interpretation  and I agree with the findings.    AFIA DANIELS MD         SYSTEM ID:  [id number]   XR Chest Port 1 View    Narrative    Examination:  XR CHEST PORT 1 VIEW    Date:  2/18/2024 9:54 AM     Clinical Information: assess for aspiration pneumonitis     Comparison: 2/15/2024.    Findings:   Single AP chest radiograph. Stable left chest ICD. Postsurgical  changes of the chest with intact median sternotomy. Mitral valve  prosthesis. Trachea is midline. Stable cardiomediastinal silhouette.  Low lung volumes with slightly increased mixed perihilar, retrocardiac  and bibasilar opacities. No significant pleural effusion. No  appreciable pneumothorax. Osteopenic bones. Upper abdomen is normal.      Impression    Impression:  Low lung volumes with slightly increased mixed perihilar and  retrocardiac opacities, favor edema/atelectasis.     DORIS BUSTAMANTE MD         SYSTEM  ID:  [id number]   Echo Complete     Value    LVEF  45-50% (mildly reduced)    Narrative    [id number]  [id number]  [id number]  [id number]^JULIO CESAR^JENNIFER^THOMAS     [facility],[place]  Echocardiography Laboratory  [address] [place] [id number]     Name: LEROY MACHADO  MRN: [id number]  : 1935  Study Date: 02/15/2024 02:55 PM  Age: 88 yrs  Gender: Male  Patient Location: [facility]  Reason For Study: Acute Myocardial Infarction  Ordering Physician: JENNIFER HARRELL  Performed By: Jessica Cedeno     BSA: 1.8 m2  Height: 67 in  Weight: 155 lb  HR: 62  BP: 126/80 mmHg  ______________________________________________________________________________  Procedure  Complete Portable Echo Adult. Contrast Optison. Optison (NDC #1496-9508-57)  given intravenously. Patient was given 6 ml mixture of 3 ml Optison and 6 ml  saline. 3 ml wasted.  ______________________________________________________________________________  Interpretation Summary  Left ventricular cavity size is small. Left ventricular function is decreased.  The ejection fraction is 45-50% (mildly reduced).  Global right ventricular function is mildly reduced.  An annuloplasty ring is noted in the mitral position. There i no stenosis or  regurgitation.  Estimated mean right atrial pressure is normal.  No pericardial effusion is present.     ______________________________________________________________________________  Left Ventricle  Left ventricular cavity size is small. Mild to moderate concentric wall  thickening consistent with left ventricular hypertrophy is present. Left  ventricular function is decreased. The ejection fraction is 45-50% (mildly  reduced).     Right Ventricle  The right ventricle is normal size. Global right ventricular function is  mildly reduced. A pacemaker lead is noted in the right ventricle.     Atria  The right atria appears normal. Mild left atrial enlargement is present.     Mitral Valve  An  annuloplasty ring is noted in the mitral position. The mean gradient across  the mitral valve is 2 mmHg.     Aortic Valve  The aortic valve is tricuspid. Mild aortic valve calcification is present.     Tricuspid Valve  Mild tricuspid insufficiency is present. The right ventricular systolic  pressure is approximated at 23.0 mmHg plus the right atrial pressure.  Pulmonary artery systolic pressure is normal.     Pulmonic Valve  On Doppler interrogation, there is no significant stenosis or regurgitation.     Vessels  The aorta root is normal. The inferior vena cava was normal in size with  preserved respiratory variability. Estimated mean right atrial pressure is  normal.     Pericardium  No pericardial effusion is present.     ______________________________________________________________________________  MMode/2D Measurements & Calculations  IVSd: 1.4 cm  LVIDd: 4.1 cm  LVIDs: 2.8 cm  LVPWd: 1.3 cm  FS: 31.7 %     LV mass(C)d: 203.9 grams  LV mass(C)dI: 112.4 grams/m2  Ao root diam: 3.4 cm  asc Aorta Diam: 3.1 cm  LVOT diam: 2.3 cm  LVOT area: 4.1 cm2  Ao root diam index Ht(cm/m): 2.0  Ao root diam index BSA (cm/m2): 1.9  Asc Ao diam index BSA (cm/m2): 1.7  Asc Ao diam index Ht(cm/m): 1.8  LA Volume (BP): 71.8 ml  LA Volume Index (BP): 39.7 ml/m2     RWT: 0.61     Doppler Measurements & Calculations  MV E max gabe: 69.7 cm/sec  MV A max gabe: 111.1 cm/sec  MV E/A: 0.63  MV dec slope: 253.8 cm/sec2  MV dec time: 0.27 sec  Ao V2 max: 111.0 cm/sec  Ao max P.9 mmHg  Ao V2 mean: 61.0 cm/sec  Ao mean P.7 mmHg  Ao V2 VTI: 18.0 cm  MARIA VICTORIA(I,D): 4.5 cm2  MARIA VICTORIA(V,D): 3.6 cm2  LV V1 max PG: 3.8 mmHg  LV V1 max: 97.5 cm/sec  LV V1 VTI: 19.6 cm  SV(LVOT): 80.6 ml  SI(LVOT): 44.4 ml/m2  PA acc time: 0.09 sec  TR max gabe: 240.0 cm/sec  TR max P.0 mmHg  AV Gabe Ratio (DI): 0.88  MARIA VICTORIA Index (cm2/m2): 2.5  E/E' avg: 10.0  Lateral E/e': 9.4  Medial E/e': 10.7      ______________________________________________________________________________  Report approved by: Evelio Delaney 02/15/2024 05:24 PM           *Note: Due to a large number of results and/or encounters for the requested time period, some results have not been displayed. A complete set of results can be found in Results Review.

## 2024-02-20 NOTE — PLAN OF CARE
Assumed care [id number]    Given PRN IV ativan x2, and fentanyl x2 overnight. Refused turning overnight. External cath in place, no BM. Has new lesion on scrotum, covered with ABD pad. L PIVx2 both SL. Wearing 1LPM oxymask for comfort. Son at bedside most of night, self-care promoted for pts son.     Plan for hospice to place inpatient orders today.

## 2024-02-20 NOTE — PROGRESS NOTES
[facility], [place]   Palliative Care Daily Progress Note          Palliative Care was consulted for symptoms and support. Patient / family signed on with [facility] hospice and at this time, the patient does not have any needs that we are actively addressing, so we will sign off. Please feel free to reconsult us if we can be helpful in the future. Thank you for the opportunity to be involved in the care of this patient.    Cristal Bartlett MD  Securely message with [employer] (more info)  Text page via [employer] Paging/Directory     During regular M-F work hours -- please contact team via Securely message with the Vocera Web Console (learn more here)  After regular work hours and on weekends/holidays, you can call our answering service at [phone]. Also, who's on call for us is available in [employer] Smart Web.

## 2024-02-20 NOTE — CONSULTS
SPIRITUAL HEALTH SERVICES - Consult Note [facility] ([facility] [place]) 10ICU     Referral Source/Reason for Visit:  Family support actively dying patient      Summary and Recommendations -  *   Spoke with son Sang and partner Rica both of whom are respecting patient's Muslim beliefs  Provided words of comfort and support  Gave Rica a card with the 23 Psalm on it, which the  had read yesterday.  Family shares that friends will stop in this evening to give family a break.         Trena Roblero   Chaplain Resident  Pager [phone]    [facility] available 24/7 for emergent requests/referrals, either by paging the on-call  or by entering an ASAP/STAT consult in [facility], which will also page the on-call .         * [facility] remains available 24/7 for emergent requests/referrals, either by having the switchboard page the on-call  or by entering an ASAP/STAT consult in Epic (this will also page the on-call ). Routine Epic consults receive an initial respon

## 2024-02-20 NOTE — CONSULTS
[facility]    Consult Note - [facility] Inpatient [facility]osp[facility]  _________________________________________________________________    [facility] 24/7 Contact Number: [phone]    - Providers: Please contact [facility] with changes in orders or clinical plan of care   - Nursing: Please contact [facility] with significant changes in patient condition    Hospice will notify the care team (including the hospitalist) to confirm date of inpatient hospice (GIP) admission.    New Epic encounter will not be created until hospice completes admission.   ______________________________________________________________________        Hospice Diagnosis: sepsis    Indication for Inpatient Hospice: dyspnea, pain, secretions    Goals for Hospital Discharge: <3 PRN's in 24 hours for 48 hours, hemodynamically stable for discharge    Plan of Care Discussed with the Following:   - Nurse: BYRON Bruno  - Hospitalist/Rounding Provider: Dr. Moya  - Noe's Family/Preferred Contact: wife, Rica  - Hospice Provider: Dr. Landrum    Summary of Visit (includes assessment, medications and any new orders):   Met with patient and family. Educated on hospice. Patient resting comfortably in bed, RR 14, bedside nurse reported dyspnea overnight. All questions answered to family's satisfaction. Discussed anticipated death within the next couple of days due to patient not having any significant amount of fluids since last Wednesday. Spoke with Danya Moya PA-C and patient placement will need to be called in the morning to create a new encounter.     New orders:  Morphine 5mg SL Q3 PRN  Discontinue fentanyl       Charlotte Verderame

## 2024-02-21 NOTE — PROGRESS NOTES
[facility]    Progress Note - [facility]    ______________________________________________________________________    [facility] 24/7 Contact Number: [phone]    - Providers: Please contact [facility] with changes in orders or clinical plan of care   - Nursing: Please contact [facility] with significant changes in patient condition  ______________________________________________________________________        Plan of Care Discussed with the Following:   - Nurse: Toshia Ford, RN  - Hospitalist/Rounding Provider: Danya Moya  - Noe's Family/Preferred Contact: Rica Levine (Significant other), See Florence (son)  - Hospice Provider: Not needed    Summary of Visit (includes assessment findings, discharge planning progress/status or any new orders):   Assessed Sha in room  in [facility]. He had justr moved from the ICU. He was grimacing but his son Sang was helping position him and he stopped grimacing. Arms swollen with taught skin, elevated on pillows to heart level. BP cuff on right wrist moved fluid so hopefully gravity will move more overnight.     Educated and reassured Sang and Rica about hospice and end of life.     Spoke to floor nurse Toshia who is concerned about burden of care giving meds every hour, I agree that isn't the best plan for pain management.     Consulted Hospice Dr. Katarzyna Landrum, he recommended a continuous pump of fentanyl 50mcg/hr plus a bolus of 50mcg available Q2H PRN pain, grimace, or dyspnea. OK to stop oral morphine due to difficulty swallowing. Could consider change to morphine 3mg/hr pump plus 6mg Q2H prn if family decides to switch to morphine. Right now they prefer to stay with fentanyl.     Updated on call Dr. Moya via Vocera text. She called and agreed to make the change but there is a limit, the bolus can be ordered Q60min so we agreed to 25mcg Q60 min PRN.      Updated floor RN Toshia.       Katelynn Gipson RN

## 2024-02-21 NOTE — PROGRESS NOTES
Brief update note:    Notified by bedside RN patient has been showing signs of pain and discomfort with use of IV fentanyl 25 mcg. Has been receiving IV fentanyl about every 1 hour this evening. Patient is on comfort care plan and in inpatient hospice. Will increase IV fentanyl to 50 mcg Q1H as needed and monitor response. If continued signs of discomfort, will switch to longer acting IV morphine or dilaudid if needed.    Ross Torres MD  [facility] Hospitalist     Tazorac Counseling:  Patient advised that medication is irritating and drying.  Patient may need to apply sparingly and wash off after an hour before eventually leaving it on overnight.  The patient verbalized understanding of the proper use and possible adverse effects of tazorac.  All of the patient's questions and concerns were addressed.

## 2024-02-21 NOTE — PROGRESS NOTES
[facility]    Social Work Progress Note - [facility]    ______________________________________________________________________    [facility] 24/7 Contact Number: [phone]    - Providers: Please contact [facility] with changes in orders or clinical plan of care   - Nursing: Please contact [facility] with significant changes in patient condition  - Social Work: Please contact [facility] for discharge phanning/updates  ______________________________________________________________________        Summary of Visit (includes psychosocial assessment/updates, acceptance of palliative care/hospice philosophy, discharge plans):   Writer called patient's POA and Significant other Rica as part of completing virtual social work hospice evaluation visit.     Interventions and response to Interventions (short-term counseling, family conference, education/resources offered to the family):  Writer introduced self and explained role of hospice social worker to patient's significant other Rica who confirmed understanding of hospice philosophy as well as patient's proglossis/continued decline.  Rica thanked writer for support provided now and agreed to call hospice number with any needs.     Plan of Care Discussed with the Following:     - Noe's Family/Preferred Contact: Rica   - Hospice nurse: YUE Andres

## 2024-02-21 NOTE — PROGRESS NOTES
Gave Fentanyl as needed for pain and comfort. Ativan and Morphine added and Ativan utilized.    Continuing to keep patient comfortable

## 2024-02-21 NOTE — PLAN OF CARE
Major shift events:   Pt remains on hospice care  Per family request, no position changes unless they request  PRN SL Morphine added; although pt almost continuously has jaw clenched. Per d/w family, they prefer to not try to open pt's mouth, as he grimaces with any manipulation.  Administered 50 mcg fentanyl ~q1h  Lethargic. No meaningful response to family or staff.  1L OxyMask  Pulse ox remains on, displaying at nurse's station; O2 >97% entire shift  Primofit in place with low UOP  Wounds on skin not assessed  Pt's Rica BANERJEE and sonSang at bedside throughout the day  Plan: Transfer to [facility] unit when bed available.

## 2024-02-21 NOTE — PROGRESS NOTES
Major Shift Events:  Pt was showing signs of increased pain despite Q1H 25mcg fentanyl. Provider notified and dose changed to 50mcg with better pain relief. Pt showed signs of severe pain when being repositioned, so this RN discussed with the pt's wife that it might be best to not turn him to promote comfort. Pt's wife agreed to this plan. Increasing somnolence.    Plan: Continue maintaining comfort and pain relief.    For vital signs and complete assessments, please see documentation flowsheets.

## 2024-02-21 NOTE — PROGRESS NOTES
[facility]    Medicine Progress Note - Hospitalist Service, GOLD TEAM 19    Date of Admission:  2/15/2024    Assessment & Plan     Noe Florence is a 88 year old male with PMH notable for CKD3 (baseline Cr ~1.4-1.8), Afib, CAD with past CABG and stents, ischemic cardiomyopathy, polymorphic V-tach s/p ICD, dementia, monoclonal gammopathy of unknown significance, polyneuropathy, L common iliac aneurysm, polymyalgia rheumatica, past colon cancer, recent treatment for L 2nd toe osteomyelitis. Admitted to [facility]- ICU for sepsis, AMS, ARSALAN, and profound hypernatremia in the setting of failure to thrive.         Today's changes:   Patient in comfort care. Currently appears resting comfortably. On iv fentanyl and morphine concn SL prn.   Family at bedside.   No acute concern.  Appreciate Hospice team consult, following.       Severe oropharyngeal dysphagia, profound hypernatremia, ARSALAN with uremia, severe malnutrition, metabolic encephalopathy with baseline dementia:   Failure to thrive with progressive decline over the past few years, accelerated over the past few months.  Patient has been living with and cared for by his significant other Rica, with progressive decline upon returning home from the hospital when he was treated for osteomyelitis of the toe 12/2023.  He has required a Lupillo lift and in-home help twice a day to assist with basic cares.  More recently Rica and patient's son See note that he has had increasing difficulty swallowing, pocketing food and not able to tolerate oral nutritional or liquid intake.  He become progressively weak and bedridden.  Initial labs upon admission to the hospital 2/15/2024 significant for sodium 164, , bicarb 18, creatinine 2.73, with baseline creatinine 1.4-1.8.  , troponin 140 with subsequent downward trend, elevated BNP, WBC 21, hemoglobin 10.3.  Head CT showed old ischemic changes and moderate  generalized volume loss with leukoaraiosis.  CXR stable with perihilar and bibasilar streaky/patchy opacities.  1 of 2 blood cultures positive for Staph epidermidis and Enterococcus faecalis.  Repeat blood cultures 2/16/2024 have been negative to date.  History of recently treated left toe osteomyelitis, had been seen by infectious disease 1/31/2024 following an extended course of antibiotics, and antibiotics have been stopped at that time.  The patient was admitted initially to the ICU, treated with hypotonic IV fluids for severe hypernatremia, ARSALAN, supplemental oxygen and empiric IV antibiotics with Zosyn and vancomycin.  Infectious disease was consulted.  Hypernatremia, ARSALAN subsequently improved with hypotonic fluids, with some transient improvement in encephalopathy, but continued severe dysphagia for which he was assessed by ST and unable to manage his own secretions let alone oral intake.  Subsequently developed worsening volume overload with CHF, confirmed by markedly elevated BNP in the 20,000's, CXR 2/18 showing vascular congestion despite sodium being still slightly elevated at 147, necessitating discontinuation of hypotonic IV fluids.    Palliative care has been following in the hospital and met with patient's significant other Rica and patient's son See on 2/16, with goals of care discussions including DNR/DNI and consideration of hospice.    On 2/18 patient is once again very lethargic, attempt some verbalization but incoherent, but does appear comfortable and oxygenating adequately on room air despite volume overload, with head of bed elevated.  Per RN, has been tolerating IV fentanyl for his chronic pain, previous use of Dilaudid resulted in recurrent hypotension.    Met with patient's significant other Rica, and his son See for an extended family conference on 2/18 to review clinical course and goals of care.  Rica notes that his quality of life has been very poor for the  "past year, particularly since December following his hospitalization for osteomyelitis.  She has been caring for him at home with the help of aides in the morning and in the evening.  He has required a Lupillo lift, had previously been using a Nicci steady at home prior to that hospitalization.  His hospital bed is in the living room.  Rica and See have noticed increased difficulty swallowing over the past few weeks with him pocketing food and taking very little oral intake.  See agrees that his father's quality of life \"is poor\".  See feels that his father's progressive dementia over the past few years has contributed to his decline.  We discussed the competing ongoing issue of worsening CHF despite persistent hypernatremia, he has persistent severe dysphagia despite improvement in his hypernatremia, his baseline poor quality of life and the unlikelihood that we would experience any meaningful recovery with ongoing aggressive medical care.  Rica and See both expressed a desire to transition care focus to comfort and initiate hospice care.  Rica notes that the increased expenses of caring for him over the past few years have drastically depleted their finances, and they would hope that he could receive hospice care onsite.  We discussed immediate care plans, they both agreed with discontinuing IV fluids, antibiotics, lab tests, Coumadin, other noncomfort related interventions and transition to comfort care.  He is currently tolerating IV fentanyl and it was agreed to continue this for his acute and chronic pain needs.  I subsequently spoke with Dr. Winston of palliative care, who recommended general inpatient hospice consultation given the above.  New orders placed 2/18:  -Discontinued IV fluids, lab draws, antibiotics, Coumadin, Accu-Cheks  -Comfort care order set placed, continue IV fentanyl for pain as he is tolerating this thus far better than he had Dilaudid plus SL morphine " prn.   -General inpatient hospice consultation, following.   - ativan SL prn     CAD, ICM, H/O Vtach s/p ICD, H/O Afib, Acute on chronic systolic CHF:  ECHO 2/15/2024: EF 45-50%, previous EF 55-60% 1/30/2017.  Had elevated troponin on admission, with subsequent trend down.  EKG V paced.  Required hypotonic IV fluids for severe hypernatremia, ARSALAN with uremia at the time of admission, but subsequently developed worsening volume overload, marked increase in BNP with vascular congestion on chest x-ray despite persistent, albeit improved hypernatremia making volume management extremely tenuous.  He currently appears to be tolerating volume overload well from a respiratory standpoint, saturating 98% on room air.  Receiving fentanyl for pain, will which should help with dyspnea.  As discussed, family conference held with patient's significant other Rica and his son See on 2/18.  -discontinue IV fluids, Coumadin and further lab draws.  Transition to comfort cares with hospice.    CKD, stage III with ARSALAN:  Baseline creatinine 1.4-1.8.  Initial labs on 2/15 notable for sodium 164, bicarb 18, , creatinine 2.73 in the setting of very poor oral nutritional and fluid intake at home.  Improved with hypotonic IV fluids, but now volume overloaded with vascular congestion/CHF on chest x-ray and markedly elevated BNP.  This despite sodium still being mildly elevated at 147.  Now transitioning to comfort care with hospice.  -Discontinue hypotonic fluids, discontinue lab draws  -Comfort focused care    Sepsis with history of osteomyelitis of the left second toe:  Had been hospitalized 12/2023 for osteomyelitis of the left second toe.  Received an extended 7.5 week course of antibiotics.  Had follow-up outpatient with infectious disease 1/31/2024, antibiotics discontinued.  At hospital admission noted to have elevated CRP, leukocytosis in the setting of ARSALAN, severe hypernatremia and uremia.  1 of 2 blood cultures 2/15  grew Staph epidermidis and Enterococcus faecalis.  Treated with IV Zosyn and vancomycin initially.  Subsequent blood culture 2/16 has been negative to date.  Leukocytosis resolving.  Has ongoing left toe and foot wounds as well as multiple other pressure sores for which wound care nursing is following.  Infectious disease has been following in the hospital.  Unfortunately patient's baseline functional status very poor with progressive decline over the past few years, accelerated over the past few months.  Although labs and hemodynamics have improved with IV fluids, he remains very lethargic, unable to tolerate oral intake due to severe dysphagia.  Now on comfort care.    -Discussed with family, they were in agreement with discontinuing antibiotics and further lab draws.  -ICD deactivated    Chronic anemia, thrombocytopenia, MGUS:  Transfused 1 unit for hemoglobin 6.9 on 2/17, subsequent worsening volume overload.  CBC stable 2/18/2024.  Now transitioning to comfort cares.  -no further lab draws    History of DVT:  Transitioned to hospice, comfort cares.  Discontinued Coumadin, INR draws.    Lymphedema:  Symptomatic management at this point.    Chronic left foot, right lateral malleoli are wounds with history of left second toe osteomyelitis, recently treated with new/more recent onset pressure ulcers:  As above, discontinued antibiotics, lab draws.  Continue current wound cares, optimize comfort.  Fentanyl as needed for pain.    History of left common iliac artery aneurysm:      Diet: NPO for Medical/Clinical Reasons Except for: Meds    DVT Prophylaxis: had been on coumadin, transitioned to comfort cares 2/18/24, coumadin stopped.   Dominguez Catheter: Not present  Lines: None     Cardiac Monitoring: None  Code Status: No CPR- Do NOT Intubate      Clinically Significant Risk Factors              # Hypoalbuminemia: Lowest albumin = 2.9 g/dL at 2/16/2024  1:19 AM, will monitor as appropriate       # Hypertension: Noted on  problem list         # Moderate Malnutrition: based on nutrition assessment    # Financial/Environmental Concerns: none   # ICD device present       Disposition Plan            Mckinley Ibarra MD  Hospitalist Service, GOLD TEAM 19  [facility]  Securely message with [employer] (more info)  Text page via [facility] Paging/Directory   See signed in provider for up to date coverage information  ______________________________________________________________________    Interval History     Family at bedside.  Patient appears comfortable, resting with eyes closed.  No acute concern for RN.     Physical Exam   Vital Signs:             SpO2: 99 % O2 Device: Oxymask Oxygen Delivery: 1 LPM  Weight: 150 lbs 5.66 oz      General Appearance: Resting comfortably with eyes closed.   Respiratory: Normal work of breathing.  Cardiovascular: not examined.   GI: not examined.   Extremities: Dependent edema present.   Neuro: sleeping      30 MINUTES SPENT BY ME on the date of service doing chart review, history, exam, documentation & further activities per the note.      Data      CMP  Recent Labs   Lab 02/18/24  1207 02/18/24  1159 02/18/24  0845 02/18/24  0553 02/18/24  0423 02/18/24  0032 02/17/24  1956 02/17/24  1614 02/17/24  1350 02/17/24  0424 02/17/24  0420 02/17/24  0009 02/16/24  0746 02/16/24  0526 02/16/24  0328 02/16/24  0119 02/15/24  1618 02/15/24  1250   NA  --  147*  --  147*  147*  --   --  148*  148*  --  151*  --  152*  152* 151*   < > 167*  162*  --  161*   < > 164*   POTASSIUM  --   --   --  3.3*  --   --  3.4  --   --   --  3.8 3.2*   < > 3.5  --  3.6   < > 4.5   CHLORIDE  --   --   --  121*  --   --  121*  --   --   --  126* 125*   < > 133*  --  131*   < > 130*   CO2  --   --   --  16*  --   --  18*  --   --   --  17* 18*   < > 18*  --  18*   < > 18*   ANIONGAP  --   --   --  10  --   --  9  --   --   --  9 8   < > 11  --  12   < > 16*   GLC 88  --  101* 96 101*   < > 135*    < >  --    < > 97 140*   < > 108*   < > 136*   < > 140*   BUN  --   --   --  36.1*  --   --  44.5*  --   --   --  62.2* 66.5*   < > 92.4*  --  95.1*   < > 107.0*   CR  --   --   --  1.18*  --   --  1.31*  --   --   --  1.60* 1.66*   < > 2.15*  --  2.39*   < > 2.73*   GFRESTIMATED  --   --   --  59*  --   --  52*  --   --   --  41* 39*   < > 29*  --  25*   < > 22*   KEITH  --   --   --  7.8*  --   --  7.8*  --   --   --  7.9* 7.9*   < > 8.0*  --  8.5*   < > 9.8   MAG  --   --   --   --   --   --  1.8  --   --   --  1.9  --   --  2.6*  --  2.4*  --   --    PHOS  --   --   --   --   --   --   --   --   --   --  1.6*  --   --  3.1  --  3.3  --   --    PROTTOTAL  --   --   --   --   --   --   --   --   --   --   --   --   --   --   --  5.8*  --  6.5   ALBUMIN  --   --   --   --   --   --   --   --   --   --   --   --   --   --   --  2.9*  --  3.3*   BILITOTAL  --   --   --   --   --   --   --   --   --   --   --   --   --   --   --  0.4  --  0.3   ALKPHOS  --   --   --   --   --   --   --   --   --   --   --   --   --   --   --  52  --  57   AST  --   --   --   --   --   --   --   --   --   --   --   --   --   --   --  35  --  28   ALT  --   --   --   --   --   --   --   --   --   --   --   --   --   --   --  18  --  15    < > = values in this interval not displayed.     CBC  Recent Labs   Lab 02/18/24  0553 02/17/24  0420 02/16/24 0526 02/16/24  0119   WBC 11.1* 12.8* 13.4* 14.1*   RBC 2.81* 2.27* 2.43* 3.00*   HGB 8.4* 6.9* 7.5* 9.3*   HCT 27.6* 22.2* 24.3* 31.0*   MCV 98 98 100 103*   MCH 29.9 30.4 30.9 31.0   MCHC 30.4* 31.1* 30.9* 30.0*   RDW 18.0* 15.9* 16.2* 16.0*   * 126* 127* 136*     INR  Recent Labs   Lab 02/18/24  0553 02/17/24  0420 02/16/24  0526 02/15/24  1232   INR 2.36* 2.21* 1.90* 1.87*     Arterial Blood Gas  Recent Labs   Lab 02/18/24  1013 02/16/24  0526 02/16/24  0119   PH  --  7.35  --    PCO2  --  33*  --    PO2  --  101  --    HCO3  --  19*  --    O2PER 21 21 21   Venous Blood Gas  Recent  Labs   Lab 02/18/24  1013 02/16/24  0526 02/16/24  0119   PHV 7.41  --  7.26*   PCO2V 30*  --  46   PO2V 40  --  21*   HCO3V 19*  --  20*   UZIEL -4.9*  --  -6.3*   O2PER 21 21 21     Hemoglobin A1C   Date Value Ref Range Status   12/08/2023 6.0 (H) <5.7 % Final     Comment:     Normal <5.7%   Prediabetes 5.7-6.4%    Diabetes 6.5% or higher     Note: Adopted from ADA consensus guidelines.   02/27/2012 5.7 4.3 - 6.0 % Final   01/27/2011 6.0 4.3 - 6.0 % Final   01/16/2009 6.2 (H) 4.3 - 6.0 % Final     Hemoglobin A1C POCT   Date Value Ref Range Status   05/05/2011 6.0 4.3 - 6.0 % Final     Comment:     Testing performed in clinic   12/21/2010 5.8 4.3 - 6.0 % Final     Comment:     Testing performed in clinic   03/15/2010 6.1 (H) 4.3 - 6.0 % Final     Comment:     Testing performed in clinic     Results for orders placed or performed during the hospital encounter of 02/15/24   XR Chest Port 1 View    Narrative    XR CHEST PORT 1 VIEW  2/15/2024 1:15 PM     HISTORY:  dyspnea       COMPARISON:  12/11/2023 CXR    TECHNIQUE: Portable, semiupright, frontal projection radiograph of the  chest.    FINDINGS:   Implantable cardiac defibrillator projects over the left upper chest  with stable positioning of right atrial lead and right ventricular  shock coil. Mitral valve prosthesis. Postsurgical changes of the chest  with median sternotomy wires intact.    Cardiac mediastinal silhouette is stable. The trachea is midline. No  appreciable pneumothorax. Streaky opacities of the lower lungs and  perihilar regions. No focal pulmonary consolidations.     Visualized upper abdomen is unremarkable. No acute osseous  abnormalities.      Impression    IMPRESSION:  Unchanged perihilar and bibasilar streaky/patchy opacity. No  appreciable pneumothorax or acute pulmonary consolidations.    I have personally reviewed the examination and initial interpretation  and I agree with the findings.    AFIA DANIELS MD         SYSTEM ID:  [id number]    Head CT w/o contrast    Narrative    CT HEAD W/O CONTRAST 2/15/2024 2:11 PM    History: AMS     Comparison: Head CT from 9/11/2016.    Technique: Using multidetector thin collimation helical acquisition  technique, axial, coronal and sagittal CT images from the skull base  to the vertex were obtained without intravenous contrast.   (topogram) image(s) also obtained and reviewed.    Findings:   Images are degraded aided by motion artifact.  There is no intracranial hemorrhage, mass effect, or midline shift.  Gray/white matter differentiation in both cerebral hemispheres is  preserved. Ventricles are proportionate to the cerebral sulci.  Moderate generalized parenchymal volume loss. White matter hyper  attenuation, most likely represents chronic small vessel ischemic  disease. The right perirolandic chronic insult (series 3 image 16).  The basal cisterns are clear. Arterial consultations.    The bony calvaria and the bones of the skull base are normal. The  visualized portions of the paranasal sinuses are clear. Right mastoid  and middle ear cavity effusion. Bilateral pseudophakia.      Impression    Impression:  1. No acute intracranial pathology.   2. Moderate generalized parenchymal volume loss and leukoaraiosis.   3. Right perirolandic chronic insult.  4. Right mastoid and middle ear cavity effusion.    THOMAS MCHUGH MD         SYSTEM ID:  [id number]   US Renal Complete Non-Vascular    Narrative    EXAMINATION: US RENAL COMPLETE NON-VASCULAR, 2/16/2024 8:21 AM     COMPARISON: CT abdomen and pelvis dated 12/6/2023.    HISTORY: ARSALAN    TECHNIQUE: The kidneys and bladder were scanned in the standard  fashion with specialized ultrasound transducer(s) using both gray  scale and limited color/spectral Doppler techniques.    FINDINGS:    Right kidney: Measures 11.1 cm in length. Parenchyma is of normal  thickness and echogenicity. No focal mass. No hydronephrosis.    Left kidney: Measures 10 cm in length.  Parenchyma is of normal  thickness and echogenicity. 1.9 x 1.7 x 1.8 cm anechoic cyst noted in  the upper pole. No hydronephrosis.     Bladder: Decompressed by Dominguez.      Impression    IMPRESSION:  1.  No hydronephrosis.  2.  Simple left renal cyst.    DORIS BUSTAMANTE MD         SYSTEM ID:  [id number]   XR Abdomen Port 1 View    Narrative    EXAMINATION:  XR ABDOMEN PORT 1 VIEW 2/16/2024 5:39 PM     COMPARISON: 12/11/2023.    HISTORY: NG placement.    TECHNIQUE: Frontal view of the abdomen.    FINDINGS: No enteric tube is visualized. No abnormally dilated loops  of bowel. No pneumatosis or portal venous gas. Prominent infected  rectal stool. Left hip fixation hardware.       Impression    IMPRESSION: No enteric tube is visualized.     Findings were discussed with Matt Dunn RN by Dr. Hernandes at 9:10  PM on 2/16/2024. Patient does not currently have an NG tube. Recent  unsuccessful placement attempt.    I have personally reviewed the examination and initial interpretation  and I agree with the findings.    AFIA DANIELS MD         SYSTEM ID:  [id number]   XR Chest Port 1 View    Narrative    Examination:  XR CHEST PORT 1 VIEW    Date:  2/18/2024 9:54 AM     Clinical Information: assess for aspiration pneumonitis     Comparison: 2/15/2024.    Findings:   Single AP chest radiograph. Stable left chest ICD. Postsurgical  changes of the chest with intact median sternotomy. Mitral valve  prosthesis. Trachea is midline. Stable cardiomediastinal silhouette.  Low lung volumes with slightly increased mixed perihilar, retrocardiac  and bibasilar opacities. No significant pleural effusion. No  appreciable pneumothorax. Osteopenic bones. Upper abdomen is normal.      Impression    Impression:  Low lung volumes with slightly increased mixed perihilar and  retrocardiac opacities, favor edema/atelectasis.     DORIS BUSTAMANTE MD         SYSTEM ID:  [id number]   Echo Complete     Value    LVEF  45-50% (mildly reduced)    Narrative     [id number]  [id number]  [id number]  [id number]^JULIO CESAR^JENINFER^THOMAS     [facility]  [address] [place] [address]     Name: LEROY MACHADO  MRN: [id number]  : 1935  Study Date: 02/15/2024 02:55 PM  Age: 88 yrs  Gender: Male  Patient Location: [facility]  Reason For Study: Acute Myocardial Infarction  Ordering Physician: JENNIFER HARRELL  Performed By: Jessica Cedeno     BSA: 1.8 m2  Height: 67 in  Weight: 155 lb  HR: 62  BP: 126/80 mmHg  ______________________________________________________________________________  Procedure  Complete Portable Echo Adult. Contrast Optison. Optison (NDC #1625-3446-03)  given intravenously. Patient was given 6 ml mixture of 3 ml Optison and 6 ml  saline. 3 ml wasted.  ______________________________________________________________________________  Interpretation Summary  Left ventricular cavity size is small. Left ventricular function is decreased.  The ejection fraction is 45-50% (mildly reduced).  Global right ventricular function is mildly reduced.  An annuloplasty ring is noted in the mitral position. There i no stenosis or  regurgitation.  Estimated mean right atrial pressure is normal.  No pericardial effusion is present.     ______________________________________________________________________________  Left Ventricle  Left ventricular cavity size is small. Mild to moderate concentric wall  thickening consistent with left ventricular hypertrophy is present. Left  ventricular function is decreased. The ejection fraction is 45-50% (mildly  reduced).     Right Ventricle  The right ventricle is normal size. Global right ventricular function is  mildly reduced. A pacemaker lead is noted in the right ventricle.     Atria  The right atria appears normal. Mild left atrial enlargement is present.     Mitral Valve  An annuloplasty ring is noted in the mitral position. The mean gradient across  the mitral valve is 2  mmHg.     Aortic Valve  The aortic valve is tricuspid. Mild aortic valve calcification is present.     Tricuspid Valve  Mild tricuspid insufficiency is present. The right ventricular systolic  pressure is approximated at 23.0 mmHg plus the right atrial pressure.  Pulmonary artery systolic pressure is normal.     Pulmonic Valve  On Doppler interrogation, there is no significant stenosis or regurgitation.     Vessels  The aorta root is normal. The inferior vena cava was normal in size with  preserved respiratory variability. Estimated mean right atrial pressure is  normal.     Pericardium  No pericardial effusion is present.     ______________________________________________________________________________  MMode/2D Measurements & Calculations  IVSd: 1.4 cm  LVIDd: 4.1 cm  LVIDs: 2.8 cm  LVPWd: 1.3 cm  FS: 31.7 %     LV mass(C)d: 203.9 grams  LV mass(C)dI: 112.4 grams/m2  Ao root diam: 3.4 cm  asc Aorta Diam: 3.1 cm  LVOT diam: 2.3 cm  LVOT area: 4.1 cm2  Ao root diam index Ht(cm/m): 2.0  Ao root diam index BSA (cm/m2): 1.9  Asc Ao diam index BSA (cm/m2): 1.7  Asc Ao diam index Ht(cm/m): 1.8  LA Volume (BP): 71.8 ml  LA Volume Index (BP): 39.7 ml/m2     RWT: 0.61     Doppler Measurements & Calculations  MV E max gabe: 69.7 cm/sec  MV A max gabe: 111.1 cm/sec  MV E/A: 0.63  MV dec slope: 253.8 cm/sec2  MV dec time: 0.27 sec  Ao V2 max: 111.0 cm/sec  Ao max P.9 mmHg  Ao V2 mean: 61.0 cm/sec  Ao mean P.7 mmHg  Ao V2 VTI: 18.0 cm  MARIA VICTORIA(I,D): 4.5 cm2  MARIA VICTORIA(V,D): 3.6 cm2  LV V1 max PG: 3.8 mmHg  LV V1 max: 97.5 cm/sec  LV V1 VTI: 19.6 cm  SV(LVOT): 80.6 ml  SI(LVOT): 44.4 ml/m2  PA acc time: 0.09 sec  TR max gabe: 240.0 cm/sec  TR max P.0 mmHg  AV Gabe Ratio (DI): 0.88  MARIA VICTORIA Index (cm2/m2): 2.5  E/E' avg: 10.0  Lateral E/e': 9.4  Medial E/e': 10.7     ______________________________________________________________________________  Report approved by: Evelio Delaney 02/15/2024 05:24 PM           *Note: Due to a large  number of results and/or encounters for the requested time period, some results have not been displayed. A complete set of results can be found in Results Review.

## 2024-02-22 NOTE — PROGRESS NOTES
Brief Medicine Note  Contacted by hospice nursing regarding changes to patient's pain regimen. Started Fentanyl PCA per their recommendations with continuous rate of 50mcg  and 25 mcg bolus every 60 minutes. One more IV fentanyl injection available while waiting pump initiation.     Danya Moya PA-C  Hospitalist Service

## 2024-02-22 NOTE — PROGRESS NOTES
Care Management Follow Up    Length of Stay (days): 7    Expected Discharge Date: TBD     Concerns to be Addressed: Inpatient Hospice    Patient plan of care discussed at interdisciplinary rounds: Yes    Anticipated Discharge Disposition: Hospice     Anticipated Discharge Services: Hospice, comfort cares    Anticipated Discharge DME: None    Patient/family educated on Medicare website which has current facility and service quality ratings: N/A    Education Provided on the Discharge Plan: Yes    Patient/Family in Agreement with the Plan: Yes    Referrals Placed by CM/SW: Not at this time     Private pay costs discussed: Not applicable    Additional Information:    Writer received message from supervisor requesting charge nurse to communicate with MD to get patient's status changed to Inpatient Hospice. Writer called charge nurse and asked her to complete this task. She updated writer that patient is being followed by palliative and that she will contact them when she has time.    JOANA Portillo, MSW  6th Floor Medical Surgical Unit  Phone [phone]

## 2024-02-22 NOTE — PROGRESS NOTES
[facility]    Progress Note - [facility] In[facility]    ______________________________________________________________________    [facility] Contact Number: [phone]    - Providers: Please contact [facility] with changes in orders or clinical plan of care   - Nursing: Please contact [facility] with significant changes in patient condition  ______________________________________________________________________        Plan of Care Discussed with the Following:   - Nurse: Karyn Mar RN  - Hospitalist/Rounding Provider: Dr. Mckinley Ibarra     - Noe's Family/Preferred Contact: Rica Levine (Significant other)   - Hospice Provider: Dr. Landrum    Summary of Visit (includes assessment findings, discharge planning progress/status or any new orders):   Vocera Text paged Dr. Ibarra to see if they need anything today. He asked about discharge and readmit to  Hospice. Instructed there should be a new encounter ready, offered resource for step by step guide.   Saw Sha in his hospital room, he is much more comfortable today. He is sleeping in bed with no grimace. Oxygen mask is pushed up on his face, I will ask for a nasal cannula instead.   Morphine drip running at 3mg/hr and providing pain relief.   Educated Rica on progression of end of life including BP 96/40 dropped since yesterday. Likely that demonstrates end of life but also good pain management. Offered to turn off monitor, Rica declined. Offered to turn it away, she declined. Left Rica with a long time friend eating lunch.     Plan: continue morphine drip for pain management    Goal: Pain managed aeb no grimace within 24 hours achieved. New goal to maintain pain management aeb no grimace maintained for 48 hours.     Discharge: currently requires GIP level of care due to complex wound care on 7 pressure wounds and 1 skin tear and morphine drip for pain management.      Katelynn Gipson RN

## 2024-02-22 NOTE — PROGRESS NOTES
"[id number]    Plan of Care Reviewed With: patient, family     Overall Patient Progress: declining     Goal Outcome Evaluation:   Patient transfer from ICU on comfort care. On 1LPM oxymask supplementary O2, sating >90%.  Pt is non-verbal, grimacing. Started taking PRN fentanty IV Q 1 hour upon arrival to unit.  Family refused full assessment and Q2hrs turn and repositioning. Both upper extremities swollen, and elevated on pillow.  2 Left PIV's. Lower arm PIV SL and upper arm infusing. Incont of Bowel and bladder, no BM this shift.  Primo fit in place.    Shift Report  Per comfort/pain regimen, Provider order Fentanyl PCA continuous rate of 50mcg and 25 mcg bolus every 60 minutes, due to unit policy, Provider was paged regarding infusing this medication due to high risk monitoring. Provider stated \" this medication is based on patient's comfort package and can not be change or alter. However, the other solution is to transfer patient to a unit that is acceptable to administer this medication\".    At  0200 new order received to discontinued fentanyl, and start Morphine 1 mg/mL continuous infusion at a rate of 3 mL/hr. Patient appeared comfortable, in no distress. Continue to follow POC    Problem: Gas Exchange Impaired  Goal: Optimal Gas Exchange  Outcome: Progressing     Problem: End-of-Life Care  Goal: Comfort, Peace and Preserved Dignity  Outcome: Progressing    "

## 2024-02-22 NOTE — PROGRESS NOTES
The Patient was transferred to a medical surgical floor on a Continuous Fentanyl Gtt. Because fentanyl gtts are not allowed on medical surgical floors writer notified unit manager, misha, Dr. Hurley and the hospice MD to see if an alternative medication was available. Writer spoke with Dr. Landrum at [facility] and was given VORB/TORB to discharge fentanyl Gtt and start patient on Morphine 3 mg Continuous with IV PRN doses Q2H of 7mg. Floor nurse Toshia and Charge RN Jarek were notified of this change as well as the Unit manager and Danya Moya-PAC.

## 2024-02-22 NOTE — PROGRESS NOTES
[facility]  WO Nurse Inpatient Assessment     Consulted for: Multiple pressure injuries, all present on admission    Patient History (according to provider note(s):      Per Christy Singh NP on 2/16/2024:  Noe Florence is a 88 year old male with PMH notable for CKD3 (baseline Cr ~1.4-1.8), Afib, CAD with past CABG and stents, ischemic cardiomyopathy, polymorphic V-tach s/p ICD, monoclonal gammopathy of unknown significance, polyneuropathy, L common iliac aneurysm, polymyalgia rheumatica, past colon cancer, recent treatment for L 2nd toe osteomyelitis. Admitted to [facility] ICU for sepsis, AMS, ARSALAN, and profound hypernatremia likely in the setting of FTT.     Assessment:      Areas visualized during today's visit: Focused: right elbow     2/22: pt is now comfort cares. Family refused full assessment. Only right elbow assessed. Will adjust orders for comfort and reassess every other week    Pressure Injury Location: Right lateral hip    2/16 Right lateral hip    Last photo: 2/16/2024  Wound type: Pressure Injury     Pressure Injury Stage: Deep Tissue Pressure Injury (DTPI), present on admission   Wound history/plan of care:   Limited mobility, present on admission    Wound base: 25 % purple and epidermis, surrounded by pink, non-blanchable intact epidermis     Palpation of the wound bed: normal      Drainage: none     Description of drainage: none     Measurements (length x width x depth, in cm) 8  x 2  x  0 cm   Periwound skin: Intact      Color: normal and consistent with surrounding tissue      Temperature: normal   Odor: none  Pain: Irritable or crying out at intervals, due to generalized pain  Pain intervention prior to dressing change: slow and gentle cares   Treatment goal: Protection  STATUS: initial assessment  Supplies ordered: supplies stored on unit    Pressure Injury Location: Right lateral upper thigh    2/16 Right lateral upper thigh    Last photo:  2/16/2024  Wound type: Pressure Injury     Pressure Injury Stage: Deep Tissue Pressure Injury (DTPI), present on admission   Wound history/plan of care:    Limited mobility, present on admission    Wound base: 100 % maroon and epidermis     Palpation of the wound bed: normal      Drainage: none     Description of drainage: none     Measurements (length x width x depth, in cm) 4  x 2  x  0 cm   Periwound skin: Intact      Color: normal and consistent with surrounding tissue      Temperature: normal   Odor: none  Pain: Irritable or crying out at intervals due to generalized pain  Pain intervention prior to dressing change: slow and gentle cares   Treatment goal: Protection  STATUS: initial assessment  Supplies ordered: supplies stored on unit    Pressure Injury Location: Right elbow    2/16 Right elbow    Last photo: 2/22/2024  Wound type: Pressure Injury     Pressure Injury Stage: Unstageable, present on admission   Wound history/plan of care:    Limited mobility, present on admission    Wound base: 50 % eschar, 50 % non-blanchable, maroon, and epidermis     Palpation of the wound bed: normal      Drainage: scant     Description of drainage: serosanguinous     Measurements (length x width x depth, in cm) 8  x 2  x  0.2 cm      Tunneling N/A     Undermining N/A  Periwound skin: Intact      Color: pink      Temperature: normal   Odor: none  Pain: Irritable or crying out at intervals due to generalized pain  Pain intervention prior to dressing change: slow and gentle cares   Treatment goal: Protection  STATUS: initial assessment  Supplies ordered: supplies stored on unit    Pressure Injury Location: Right lateral ankle    2/16 Right lateral ankle    Last photo: 2/16/2024  Wound type: Pressure Injury     Pressure Injury Stage: Unstageable, present on admission   Wound history/plan of care:    Limited mobility, present on admission    Wound base: 50 % eschar, 50 % dermis     Palpation of the wound bed: normal       Drainage: scant     Description of drainage: serous     Measurements (length x width x depth, in cm) 2  x 2  x  0.2 cm      Tunneling N/A     Undermining N/A  Periwound skin: Intact      Color: normal and consistent with surrounding tissue      Temperature: normal   Odor: none  Pain: Irritable or crying out at intervals due to generalized pain  Pain intervention prior to dressing change: slow and gentle cares   Treatment goal: Protection  STATUS: initial assessment  Supplies ordered: supplies stored on unit    Pressure Injury Location: Left heel    2/16    Last photo: 2/16/2024  Wound type: Pressure Injury     Pressure Injury Stage: Deep Tissue Pressure Injury (DTPI), present on admission   Wound history/plan of care:    Limited mobility, present on admission    Wound base: 100 % purple and epidermis     Palpation of the wound bed: normal      Drainage: none     Description of drainage: none     Measurements (length x width x depth, in cm) 4  x 4  x  0 cm      Tunneling N/A     Undermining N/A  Periwound skin: Intact      Color: pink      Temperature: normal   Odor: none  Pain: Irritable or crying out at intervals due to generalized pain  Pain intervention prior to dressing change: slow and gentle cares   Treatment goal: Protection  STATUS: initial assessment  Supplies ordered: supplies stored on unit    Pressure Injury Location: Sacrum/coccyx    2/16 Sacrum and coccyx    Last photo: 2/16/2024  Wound type: Pressure Injury     Pressure Injury Stage: Unstageable, present on admission   Wound history/plan of care:    Limited mobility, present on admission    Wound base: Sacrum: 50 % eschar, 50 % slough; Coccyx: 100% slough     Palpation of the wound bed: normal      Drainage: none     Description of drainage: none     Measurements (length x width x depth, in cm)  Total area 7  x 10  x  0.4 cm      Tunneling N/A     Undermining N/A  Periwound skin: Erythema- blanchable      Color: pink      Temperature: normal   Odor:  none  Pain: Irritable or crying out at intervals due to generalized pain  Pain intervention prior to dressing change: slow and gentle cares   Treatment goal: Protection  STATUS: initial assessment  Supplies ordered: supplies stored on unit    Wound location: Right wrist    2/16 Right wrist    Last photo: 2/16/2024  Wound due to: Skin Tear  Wound history/plan of care: Skin tear present on admission, prior to line placement  Wound base: 25 dermis, 75 % fibrin     Palpation of the wound bed: normal      Drainage: scant     Description of drainage: serous     Measurements (length x width x depth, in cm): 2  x 1  x  0.2 cm      Tunneling: N/A     Undermining: N/A  Periwound skin: Intact      Color: pink      Temperature: normal   Odor: none  Pain: Irritable or crying out at intervals due to generalized pain  Pain interventions prior to dressing change: slow and gentle cares   Treatment goal: Heal  and Protection  STATUS: initial assessment  Supplies ordered: supplies stored on unit    Wound location: Left toes    2/16 Left toes    Last photo: 2/16/2024  Wound due to: Trauma vs pressure from deformity  Wound history/plan of care: Patient follows with Podiatry for known osteo in left 2nd toe, not a surgical candidate for deformity correction at this time.   Wound base: 100 % dermis     Palpation of the wound bed: normal      Drainage: scant     Description of drainage: serous     Measurements (length x width x depth, in cm): see photo  Periwound skin: Intact      Color: pink      Temperature: normal   Odor: none  Pain: Irritable or crying out at intervals due to generalized pain   Pain interventions prior to dressing change: slow and gentle cares   Treatment goal: Protection  STATUS: initial assessment  Supplies ordered: supplies stored on unit    Treatment Plan:      Right elbow wound: Every other day: Remove dressing and wash wounds with wound cleanser and gauze. Mepilex 4x4.     Right hip, right lateral upper thigh,  "right lateral ankle, left posterior heel, sacrum and coccyx: Every other day: Remove dressings and wash wounds with wound cleanser and gauze. Pat dry. Cover with Mepilex.     Right wrist: Daily: Wash with saline and gauze. Cover open area with Vaseline gauze, cover with 4x4 and secure with Kerlix.    Left toes: Daily: Remove dressing and wash wound with wound cleanser and gauze. Cover open areas with Xeroform. Secure dressing with Kerlix.    Pressure Injury Prevention (PIP) Plan:  If patient is declining pressure injury prevention interventions: Explore reason why and address patient's concerns, Educate on pressure injury risk and prevention intervention(s), If patient is still declining, document \"informed refusal\" , and Ensure Care team is aware ( provider, charge nurse, etc)  Mattress: Follow bed algorithm, reassess daily and order specialty mattress, if indicated.  HOB: Maintain at or below 30 degrees, unless contraindicated  Repositioning in bed: Every 1-2 hours , Raise foot of bed prior to raising head of bed, to reduce patient sliding down (shear), and Frequent microturns using TAPS wedges, as patient tolerates  Heels: Heel lift boots  Protective Dressing: Sacral Mepilex for prevention (#563133),  especially for the agitated patient   Positioning Equipment: TAPS wedges (#940974) to help maintain 30 degree side lying position  and Seated Positioning System (SPS)  (#355037) Sling must have contact with chair, no pillow or chair cushion beneath  Chair positioning: Chair cushion (#[id number])    If patient has a buttock pressure injury, or high risk for PI use chair cushion or SPS.  Moisture Management: Perineal cleansing /protection: Follow Incontinence Protocol and Avoid brief in bed  Under Devices: Inspect skin under all medical devices during skin inspection , Ensure tubes are stabilized without tension, and Ensure patient is not lying on medical devices or equipment when repositioned  Ask provider to discontinue " device when no longer needed.    Orders: Reviewed    RECOMMEND PRIMARY TEAM ORDER: None, at this time  Education provided: importance of repositioning and plan of care  Discussed plan of care with: Family  [facility] nurse follow-up plan: every other week  Notify WOC if wound(s) deteriorate.  Nursing to notify the Provider(s) and re-consult the WOC Nurse if new skin concern.    DATA:     Current support surface: Standard  Low air loss (KISHAN pump, Isolibrium, Pulsate, skin guard, etc)  Containment of urine/stool: Indwelling catheter  BMI: Body mass index is 23.55 kg/m .   Active diet order: Orders Placed This Encounter      Advance Diet as Tolerated: Regular Diet Adult     Output: I/O last 3 completed shifts:  In: 77 [I.V.:77]  Out: 100 [Urine:100]     Labs:   Recent Labs   Lab 02/18/24  0553 02/16/24  0526 02/16/24  0119   ALBUMIN  --   --  2.9*   HGB 8.4*   < > 9.3*   INR 2.36*   < >  --    WBC 11.1*   < > 14.1*    < > = values in this interval not displayed.     Pressure injury risk assessment:   Sensory Perception: 2-->very limited  Moisture: 3-->occasionally moist  Activity: 1-->bedfast  Mobility: 2-->very limited  Nutrition: 1-->very poor  Friction and Shear: 1-->problem  Chandler Score: 10    Belinda Bains RN CWOCN  Pager no longer is use, please contact through [employer] group: [facility]  Dept. Office Number: [phone]

## 2024-02-22 NOTE — PROGRESS NOTES
[facility]    Medicine Progress Note - Hospitalist Service, GOLD TEAM 19    Date of Admission:  2/15/2024    Assessment & Plan     Noe Florence is a 88 year old male with PMH notable for CKD3 (baseline Cr ~1.4-1.8), Afib, CAD with past CABG and stents, ischemic cardiomyopathy, polymorphic V-tach s/p ICD, dementia, monoclonal gammopathy of unknown significance, polyneuropathy, L common iliac aneurysm, polymyalgia rheumatica, past colon cancer, recent treatment for L 2nd toe osteomyelitis. Admitted to [facility]- ICU for sepsis, AMS, ARSALAN, and profound hypernatremia in the setting of failure to thrive.   Now in comfort cares. Inpatient hospice team consulted, following.         Today's changes:   Patient in comfort care. Currently appears resting comfortably. Now on morphine drip.   Family at bedside.   No acute concern.  Appreciate Hospice team consult, following.       Severe oropharyngeal dysphagia, profound hypernatremia, ARSALAN with uremia, severe malnutrition, metabolic encephalopathy with baseline dementia:   Failure to thrive with progressive decline over the past few years, accelerated over the past few months.  Patient has been living with and cared for by his significant other Rica, with progressive decline upon returning home from the hospital when he was treated for osteomyelitis of the toe 12/2023.  He has required a Lupillo lift and in-home help twice a day to assist with basic cares.  More recently Rica and patient's son See note that he has had increasing difficulty swallowing, pocketing food and not able to tolerate oral nutritional or liquid intake.  He become progressively weak and bedridden.  Initial labs upon admission to the hospital 2/15/2024 significant for sodium 164, , bicarb 18, creatinine 2.73, with baseline creatinine 1.4-1.8.  , troponin 140 with subsequent downward trend, elevated BNP, WBC 21, hemoglobin 10.3.  Head CT  showed old ischemic changes and moderate generalized volume loss with leukoaraiosis.  CXR stable with perihilar and bibasilar streaky/patchy opacities.  1 of 2 blood cultures positive for Staph epidermidis and Enterococcus faecalis.  Repeat blood cultures 2/16/2024 have been negative to date.  History of recently treated left toe osteomyelitis, had been seen by infectious disease 1/31/2024 following an extended course of antibiotics, and antibiotics have been stopped at that time.  The patient was admitted initially to the ICU, treated with hypotonic IV fluids for severe hypernatremia, ARSALAN, supplemental oxygen and empiric IV antibiotics with Zosyn and vancomycin.  Infectious disease was consulted.  Hypernatremia, ARSALAN subsequently improved with hypotonic fluids, with some transient improvement in encephalopathy, but continued severe dysphagia for which he was assessed by ST and unable to manage his own secretions let alone oral intake.  Subsequently developed worsening volume overload with CHF, confirmed by markedly elevated BNP in the 20,000's, CXR 2/18 showing vascular congestion despite sodium being still slightly elevated at 147, necessitating discontinuation of hypotonic IV fluids.    Palliative care has been following in the hospital and met with patient's significant other Rica and patient's son See on 2/16, with goals of care discussions including DNR/DNI and consideration of hospice.    MD Met with patient's significant other Rcia, and his son See for an extended family conference on 2/18 to review clinical course and goals of care.  Rica notes that his quality of life has been very poor for the past year, particularly since December following his hospitalization for osteomyelitis.  She has been caring for him at home with the help of aides in the morning and in the evening.  He has required a Lupillo lift, had previously been using a Nicci steady at home prior to that hospitalization.   "His hospital bed is in the living room.  Rica and See have noticed increased difficulty swallowing over the past few weeks with him pocketing food and taking very little oral intake.  See agrees that his father's quality of life \"is poor\".  See feels that his father's progressive dementia over the past few years has contributed to his decline.  We discussed the competing ongoing issue of worsening CHF despite persistent hypernatremia, he has persistent severe dysphagia despite improvement in his hypernatremia, his baseline poor quality of life and the unlikelihood that we would experience any meaningful recovery with ongoing aggressive medical care.  Rica and See both expressed a desire to transition care focus to comfort and initiate hospice care.  Dw Dr. Winston of palliative care, who recommended general inpatient hospice consultation given the above.Discontinued IV fluids, lab draws, antibiotics, Coumadin, Accu-Cheks    - GIP consulted, comfort cares orders placed.   - Hospice team closely following, appreciate input.   - On morphine drip plus prn morphine iv/SL for pain control now besides other comfort care orders.     CAD, ICM, H/O Vtach s/p ICD, H/O Afib, Acute on chronic systolic CHF:  ECHO 2/15/2024: EF 45-50%, previous EF 55-60% 1/30/2017.    Transitioned to comfort cares with hospice.  -ICD deactivated    CKD, stage III with ARSALAN:  Baseline creatinine 1.4-1.8.      Sepsis with history of osteomyelitis of the left second toe:  Had been hospitalized 12/2023 for osteomyelitis of the left second toe.  Received an extended 7.5 week course of antibiotics.  Had follow-up outpatient with infectious disease 1/31/2024, antibiotics discontinued.  At hospital admission noted to have elevated CRP, leukocytosis in the setting of ARSALAN, severe hypernatremia and uremia.  1 of 2 blood cultures 2/15 grew Staph epidermidis and Enterococcus faecalis.  Treated with IV Zosyn and vancomycin " initially.  Subsequent blood culture 2/16 has been negative to date.  Leukocytosis resolving.  Has ongoing left toe and foot wounds as well as multiple other pressure sores for which wound care nursing is following.  Infectious disease has been following in the hospital.    -Family in agreement with discontinuing antibiotics and further lab draws.      Chronic anemia, thrombocytopenia, MGUS:  Transfused 1 unit for hemoglobin 6.9 on 2/17, subsequent worsening volume overload.  CBC stable 2/18/2024.    -no further lab draws    History of DVT:  Transitioned to hospice, comfort cares.  Discontinued Coumadin, INR draws.    Lymphedema:  Symptomatic management at this point.    Chronic left foot, right lateral malleoli are wounds with history of left second toe osteomyelitis, recently treated with new/more recent onset pressure ulcers:  As above, discontinued antibiotics, lab draws.    - Continue current wound cares, optimize comfort.  Pain control as above.     History of left common iliac artery aneurysm:      Diet: Advance Diet as Tolerated: Regular Diet Adult    DVT Prophylaxis: now comfort cares 2/18/24, coumadin stopped.   Dominguez Catheter: Not present  Lines: None     Cardiac Monitoring: None  Code Status: No CPR- Do NOT Intubate      Clinically Significant Risk Factors              # Hypoalbuminemia: Lowest albumin = 2.9 g/dL at 2/16/2024  1:19 AM, will monitor as appropriate       # Hypertension: Noted on problem list         # Moderate Malnutrition: based on nutrition assessment    # Financial/Environmental Concerns: none   # ICD device present       Disposition Plan            Mckinley Ibarra MD  Hospitalist Service, GOLD TEAM 19  [facility]  Securely message with [employer] (more info)  Text page via [employer] Paging/Directory   See signed in provider for up to date coverage information  ______________________________________________________________________    Interval  History     Family at bedside.  Patient appears comfortable, resting with eyes closed.  No acute concern for RN.     Physical Exam   Vital Signs:       Pulse: 69   Resp: 14 SpO2: 99 % O2 Device: Oxymask Oxygen Delivery: 1 LPM  Weight: 150 lbs 5.66 oz      General Appearance: Resting comfortably with eyes closed.   Respiratory: Normal work of breathing.  Cardiovascular: not examined.   GI: not examined.   Extremities: Dependent edema present.   Neuro: sleeping      35 MINUTES SPENT BY ME on the date of service doing chart review, history, exam, documentation & further activities per the note.      Data      CMP  Recent Labs   Lab 02/18/24  1207 02/18/24  1159 02/18/24  0845 02/18/24  0553 02/18/24  0423 02/18/24  0032 02/17/24  1956 02/17/24  1614 02/17/24  1350 02/17/24  0424 02/17/24  0420 02/17/24  0009 02/16/24  0746 02/16/24  0526 02/16/24  0328 02/16/24  0119   NA  --  147*  --  147*  147*  --   --  148*  148*  --  151*  --  152*  152* 151*   < > 167*  162*  --  161*   POTASSIUM  --   --   --  3.3*  --   --  3.4  --   --   --  3.8 3.2*   < > 3.5  --  3.6   CHLORIDE  --   --   --  121*  --   --  121*  --   --   --  126* 125*   < > 133*  --  131*   CO2  --   --   --  16*  --   --  18*  --   --   --  17* 18*   < > 18*  --  18*   ANIONGAP  --   --   --  10  --   --  9  --   --   --  9 8   < > 11  --  12   GLC 88  --  101* 96 101*   < > 135*   < >  --    < > 97 140*   < > 108*   < > 136*   BUN  --   --   --  36.1*  --   --  44.5*  --   --   --  62.2* 66.5*   < > 92.4*  --  95.1*   CR  --   --   --  1.18*  --   --  1.31*  --   --   --  1.60* 1.66*   < > 2.15*  --  2.39*   GFRESTIMATED  --   --   --  59*  --   --  52*  --   --   --  41* 39*   < > 29*  --  25*   KEITH  --   --   --  7.8*  --   --  7.8*  --   --   --  7.9* 7.9*   < > 8.0*  --  8.5*   MAG  --   --   --   --   --   --  1.8  --   --   --  1.9  --   --  2.6*  --  2.4*   PHOS  --   --   --   --   --   --   --   --   --   --  1.6*  --   --  3.1  --  3.3    PROTTOTAL  --   --   --   --   --   --   --   --   --   --   --   --   --   --   --  5.8*   ALBUMIN  --   --   --   --   --   --   --   --   --   --   --   --   --   --   --  2.9*   BILITOTAL  --   --   --   --   --   --   --   --   --   --   --   --   --   --   --  0.4   ALKPHOS  --   --   --   --   --   --   --   --   --   --   --   --   --   --   --  52   AST  --   --   --   --   --   --   --   --   --   --   --   --   --   --   --  35   ALT  --   --   --   --   --   --   --   --   --   --   --   --   --   --   --  18    < > = values in this interval not displayed.     CBC  Recent Labs   Lab 02/18/24 0553 02/17/24 0420 02/16/24 0526 02/16/24 0119   WBC 11.1* 12.8* 13.4* 14.1*   RBC 2.81* 2.27* 2.43* 3.00*   HGB 8.4* 6.9* 7.5* 9.3*   HCT 27.6* 22.2* 24.3* 31.0*   MCV 98 98 100 103*   MCH 29.9 30.4 30.9 31.0   MCHC 30.4* 31.1* 30.9* 30.0*   RDW 18.0* 15.9* 16.2* 16.0*   * 126* 127* 136*     INR  Recent Labs   Lab 02/18/24 0553 02/17/24 0420 02/16/24 0526   INR 2.36* 2.21* 1.90*     Arterial Blood Gas  Recent Labs   Lab 02/18/24 1013 02/16/24 0526 02/16/24 0119   PH  --  7.35  --    PCO2  --  33*  --    PO2  --  101  --    HCO3  --  19*  --    O2PER 21 21 21   Venous Blood Gas  Recent Labs   Lab 02/18/24 1013 02/16/24 0526 02/16/24 0119   PHV 7.41  --  7.26*   PCO2V 30*  --  46   PO2V 40  --  21*   HCO3V 19*  --  20*   UZIEL -4.9*  --  -6.3*   O2PER 21 21 21     Hemoglobin A1C   Date Value Ref Range Status   12/08/2023 6.0 (H) <5.7 % Final     Comment:     Normal <5.7%   Prediabetes 5.7-6.4%    Diabetes 6.5% or higher     Note: Adopted from ADA consensus guidelines.   02/27/2012 5.7 4.3 - 6.0 % Final   01/27/2011 6.0 4.3 - 6.0 % Final   01/16/2009 6.2 (H) 4.3 - 6.0 % Final     Hemoglobin A1C POCT   Date Value Ref Range Status   05/05/2011 6.0 4.3 - 6.0 % Final     Comment:     Testing performed in clinic   12/21/2010 5.8 4.3 - 6.0 % Final     Comment:     Testing performed in clinic   03/15/2010  6.1 (H) 4.3 - 6.0 % Final     Comment:     Testing performed in clinic     Results for orders placed or performed during the hospital encounter of 02/15/24   XR Chest Port 1 View    Narrative    XR CHEST PORT 1 VIEW  2/15/2024 1:15 PM     HISTORY:  dyspnea       COMPARISON:  12/11/2023 CXR    TECHNIQUE: Portable, semiupright, frontal projection radiograph of the  chest.    FINDINGS:   Implantable cardiac defibrillator projects over the left upper chest  with stable positioning of right atrial lead and right ventricular  shock coil. Mitral valve prosthesis. Postsurgical changes of the chest  with median sternotomy wires intact.    Cardiac mediastinal silhouette is stable. The trachea is midline. No  appreciable pneumothorax. Streaky opacities of the lower lungs and  perihilar regions. No focal pulmonary consolidations.     Visualized upper abdomen is unremarkable. No acute osseous  abnormalities.      Impression    IMPRESSION:  Unchanged perihilar and bibasilar streaky/patchy opacity. No  appreciable pneumothorax or acute pulmonary consolidations.    I have personally reviewed the examination and initial interpretation  and I agree with the findings.    AFIA DANIELS MD         SYSTEM ID:  [id number]   Head CT w/o contrast    Narrative    CT HEAD W/O CONTRAST 2/15/2024 2:11 PM    History: AMS     Comparison: Head CT from 9/11/2016.    Technique: Using multidetector thin collimation helical acquisition  technique, axial, coronal and sagittal CT images from the skull base  to the vertex were obtained without intravenous contrast.   (topogram) image(s) also obtained and reviewed.    Findings:   Images are degraded aided by motion artifact.  There is no intracranial hemorrhage, mass effect, or midline shift.  Gray/white matter differentiation in both cerebral hemispheres is  preserved. Ventricles are proportionate to the cerebral sulci.  Moderate generalized parenchymal volume loss. White matter hyper  attenuation,  most likely represents chronic small vessel ischemic  disease. The right perirolandic chronic insult (series 3 image 16).  The basal cisterns are clear. Arterial consultations.    The bony calvaria and the bones of the skull base are normal. The  visualized portions of the paranasal sinuses are clear. Right mastoid  and middle ear cavity effusion. Bilateral pseudophakia.      Impression    Impression:  1. No acute intracranial pathology.   2. Moderate generalized parenchymal volume loss and leukoaraiosis.   3. Right perirolandic chronic insult.  4. Right mastoid and middle ear cavity effusion.    THOMAS MCHUGH MD         SYSTEM ID:  [id number]   US Renal Complete Non-Vascular    Narrative    EXAMINATION: US RENAL COMPLETE NON-VASCULAR, 2/16/2024 8:21 AM     COMPARISON: CT abdomen and pelvis dated 12/6/2023.    HISTORY: ARSALAN    TECHNIQUE: The kidneys and bladder were scanned in the standard  fashion with specialized ultrasound transducer(s) using both gray  scale and limited color/spectral Doppler techniques.    FINDINGS:    Right kidney: Measures 11.1 cm in length. Parenchyma is of normal  thickness and echogenicity. No focal mass. No hydronephrosis.    Left kidney: Measures 10 cm in length. Parenchyma is of normal  thickness and echogenicity. 1.9 x 1.7 x 1.8 cm anechoic cyst noted in  the upper pole. No hydronephrosis.     Bladder: Decompressed by Dominguez.      Impression    IMPRESSION:  1.  No hydronephrosis.  2.  Simple left renal cyst.    DORIS BUSTAMANTE MD         SYSTEM ID:  [id number]   XR Abdomen Port 1 View    Narrative    EXAMINATION:  XR ABDOMEN PORT 1 VIEW 2/16/2024 5:39 PM     COMPARISON: 12/11/2023.    HISTORY: NG placement.    TECHNIQUE: Frontal view of the abdomen.    FINDINGS: No enteric tube is visualized. No abnormally dilated loops  of bowel. No pneumatosis or portal venous gas. Prominent infected  rectal stool. Left hip fixation hardware.       Impression    IMPRESSION: No enteric tube is visualized.      Findings were discussed with Matt Dunn RN by Dr. Hernandes at 9:10  PM on 2024. Patient does not currently have an NG tube. Recent  unsuccessful placement attempt.    I have personally reviewed the examination and initial interpretation  and I agree with the findings.    AFIA DANIELS MD         SYSTEM ID:  [id number]   XR Chest Port 1 View    Narrative    Examination:  XR CHEST PORT 1 VIEW    Date:  2024 9:54 AM     Clinical Information: assess for aspiration pneumonitis     Comparison: 2/15/2024.    Findings:   Single AP chest radiograph. Stable left chest ICD. Postsurgical  changes of the chest with intact median sternotomy. Mitral valve  prosthesis. Trachea is midline. Stable cardiomediastinal silhouette.  Low lung volumes with slightly increased mixed perihilar, retrocardiac  and bibasilar opacities. No significant pleural effusion. No  appreciable pneumothorax. Osteopenic bones. Upper abdomen is normal.      Impression    Impression:  Low lung volumes with slightly increased mixed perihilar and  retrocardiac opacities, favor edema/atelectasis.     DORIS BUSTAMANTE MD         SYSTEM ID:  [id number]   Echo Complete     Value    LVEF  45-50% (mildly reduced)    Narrative    [id number]  [id number]  [id number]  [id number]^JULIO CESAR^JENNIFER^THOMAS     [facility],[place]  Echocardiography Laboratory  [address] [place] [address]     Name: LEROY MACHADO  MRN: [id number]  : 1935  Study Date: 02/15/2024 02:55 PM  Age: 88 yrs  Gender: Male  Patient Location: [facility]  Reason For Study: Acute Myocardial Infarction  Ordering Physician: JENNIFER HARRELL  Performed By: Jessica Cedneo     BSA: 1.8 m2  Height: 67 in  Weight: 155 lb  HR: 62  BP: 126/80 mmHg  ______________________________________________________________________________  Procedure  Complete Portable Echo Adult. Contrast Optison. Optison (NDC #3659-1483-54)  given intravenously. Patient was given 6 ml mixture of 3  ml Optison and 6 ml  saline. 3 ml wasted.  ______________________________________________________________________________  Interpretation Summary  Left ventricular cavity size is small. Left ventricular function is decreased.  The ejection fraction is 45-50% (mildly reduced).  Global right ventricular function is mildly reduced.  An annuloplasty ring is noted in the mitral position. There i no stenosis or  regurgitation.  Estimated mean right atrial pressure is normal.  No pericardial effusion is present.     ______________________________________________________________________________  Left Ventricle  Left ventricular cavity size is small. Mild to moderate concentric wall  thickening consistent with left ventricular hypertrophy is present. Left  ventricular function is decreased. The ejection fraction is 45-50% (mildly  reduced).     Right Ventricle  The right ventricle is normal size. Global right ventricular function is  mildly reduced. A pacemaker lead is noted in the right ventricle.     Atria  The right atria appears normal. Mild left atrial enlargement is present.     Mitral Valve  An annuloplasty ring is noted in the mitral position. The mean gradient across  the mitral valve is 2 mmHg.     Aortic Valve  The aortic valve is tricuspid. Mild aortic valve calcification is present.     Tricuspid Valve  Mild tricuspid insufficiency is present. The right ventricular systolic  pressure is approximated at 23.0 mmHg plus the right atrial pressure.  Pulmonary artery systolic pressure is normal.     Pulmonic Valve  On Doppler interrogation, there is no significant stenosis or regurgitation.     Vessels  The aorta root is normal. The inferior vena cava was normal in size with  preserved respiratory variability. Estimated mean right atrial pressure is  normal.     Pericardium  No pericardial effusion is present.     ______________________________________________________________________________  MMode/2D Measurements &  Calculations  IVSd: 1.4 cm  LVIDd: 4.1 cm  LVIDs: 2.8 cm  LVPWd: 1.3 cm  FS: 31.7 %     LV mass(C)d: 203.9 grams  LV mass(C)dI: 112.4 grams/m2  Ao root diam: 3.4 cm  asc Aorta Diam: 3.1 cm  LVOT diam: 2.3 cm  LVOT area: 4.1 cm2  Ao root diam index Ht(cm/m): 2.0  Ao root diam index BSA (cm/m2): 1.9  Asc Ao diam index BSA (cm/m2): 1.7  Asc Ao diam index Ht(cm/m): 1.8  LA Volume (BP): 71.8 ml  LA Volume Index (BP): 39.7 ml/m2     RWT: 0.61     Doppler Measurements & Calculations  MV E max gabe: 69.7 cm/sec  MV A max gabe: 111.1 cm/sec  MV E/A: 0.63  MV dec slope: 253.8 cm/sec2  MV dec time: 0.27 sec  Ao V2 max: 111.0 cm/sec  Ao max P.9 mmHg  Ao V2 mean: 61.0 cm/sec  Ao mean P.7 mmHg  Ao V2 VTI: 18.0 cm  MARIA VICTORIA(I,D): 4.5 cm2  MARIA VICTORIA(V,D): 3.6 cm2  LV V1 max PG: 3.8 mmHg  LV V1 max: 97.5 cm/sec  LV V1 VTI: 19.6 cm  SV(LVOT): 80.6 ml  SI(LVOT): 44.4 ml/m2  PA acc time: 0.09 sec  TR max gabe: 240.0 cm/sec  TR max P.0 mmHg  AV Gabe Ratio (DI): 0.88  MARIA VICTORIA Index (cm2/m2): 2.5  E/E' avg: 10.0  Lateral E/e': 9.4  Medial E/e': 10.7     ______________________________________________________________________________  Report approved by: Eveloi Delaney 02/15/2024 05:24 PM           *Note: Due to a large number of results and/or encounters for the requested time period, some results have not been displayed. A complete set of results can be found in Results Review.

## 2024-02-22 NOTE — PROGRESS NOTES
6MS ADMISSION    D: Patient transferred from ICU via stretcher for Failure to Thrive on Comfort Care.     I: Upon arrival to the unit patient and family was oriented to room, unit, and call light. Patient s height, weight, and vital signs were obtained. Allergies reviewed and allergy band applied. Provider notified of patient s arrival on the unit. Adult AVS completed. Head to toe assessment completed. Education assessment completed. Care plan initiated.    A: . Two RN skin assessment completed Yes. Second RN was Olivia Keys. Significant Skin Findings include Wound on Left 2nd toe, and scatter bruises with right arm weeping.  [facility] Nurse Consult Ordered No. Bed Algorithm can be found in PCS flow sheets (Support Surface Algorithm) and on IP [facility] NURSE RESOURCE TAB, was this used during this assessment? Yes. Was a pulsate mattress ordered , No.    P: Continue to monitor patient s status and intervene as needed. Continue with plan of care. Notify provider with any concerns or changes in patient status.

## 2024-02-23 PROBLEM — Z51.5 HOSPICE CARE: Status: ACTIVE | Noted: 2024-01-01

## 2024-02-23 NOTE — PLAN OF CARE
Goal Outcome Evaluation:       Shift: [id number]  Patient is on comfort cares. Family requested to be left with patient. Primafit in place. Pt and family refusing repositioning due to pain. Pt resting comfortably, sating 98% with 1L of oxygen, pulse ox in place. Courtesy cart ordered from nutrition for family. Morphine drip 3 mg/hr.    Call light within reach. Plan of care ongoing

## 2024-02-23 NOTE — PHARMACY-ADMISSION MEDICATION HISTORY
Patient on comfort cares. No need for pharmacy to do an admission medication history. Writing this note for documentation as to why one was not done.    Mohini Lopez, LisbetD, BCPS

## 2024-02-23 NOTE — PLAN OF CARE
Problem: End-of-Life Care  Goal: Comfort, Peace and Preserved Dignity  Outcome: Progressing    O2: SpO2 > 98 and stable on oxymask 1 LPM   Output: On prmofit   Activity:  On bed   Skin:  With wound (son prioritized comfort, wound not assessed as patient does not want his father to be disturbed)   Pain: Pain managed with morphine PCA   CMS:  Eyes closed   Dressing:  On wound    Diet: Regular diet.    LDA: L PIV SL / infusing morphine at 3 mL/hr ; with carrier at 10ml/hr normal saline   L PIV SL   Equipment: IV pole, personal belongings,    Plan: contact precautions maintained / Continue with plan of care. Call light within reach  Plan: comfort cares   Additional Info:  0311 offered to clean / wet the oral cavity      0311 offered to clean / wet the oral cavity     0615 son comforted patient. Offered ativan

## 2024-02-23 NOTE — CONSULTS
SPIRITUAL HEALTH SERVICES Consult Note  [employer] ([place]6    Sha was surrounded by Rica (partner), Sang (son), and Emanuel (friend). I read Psalm 23 over Sha and prayed a blessing over him as he transitions to heaven.     Family shared of Sha's deep love of God, people, peace, and justice. Sha is deeply loved and will be great missed by several when he passes.    Rev. JANIS Thorne.  Chaplain Resident  Pager [phone]    * [facility] remains available 24/7 for emergent requests/referrals, either by having the switchboard page the on-call  or by entering an ASAP/STAT consult in Epic (this will also page the on-call ). Routine Epic consults receive an initial response within 24 hours.*

## 2024-02-23 NOTE — PROGRESS NOTES
Neuro - Patient unresponsive but breathing  Tele/Cardiac - WDL, bradycardia around 60 bpm   Resp - Breaths shallow, currently on 1 L via oxy mask, stable saturations at >98%.   Activity - On bed rest   Pain - Unable to assess, patient has PCA pump for morphine at 3 mg/hour  Drips - Morphine and sodium chloride piggy back @ 10 mL/hr.   Drains/Tubes -  PIV in left arm  Skin - Various skin and pressure wounds found by WOC, interventions not in place.   GI/ - External catheter in place.    COVID status - Negative   Plan - Continue with comfort cares and contact isolation. Plan to continue inpatient hospice cares.   Misc - Family has requested that the patient is to be disturbed as little as possible or not at all due to pain and end of life. Family is requesting a visit from the doctor regarding concerns about effects of oxygen supplementation prolonging life.      Kassy Hart SN

## 2024-02-23 NOTE — PROGRESS NOTES
ANTICOAGULATION  MANAGEMENT: Discharge Review    Noe Florence chart reviewed for anticoagulation continuity of care    Hospital Admission on 2/15-2/23/24 for failure to thrive, NSTEMI.    Discharge disposition:  Hospice    Results:    Recent labs: (last 7 days)     02/17/24  0420 02/18/24  0553   INR 2.21* 2.36*     Anticoagulation discharge instructions:     Warfarin dosing: warfarin discontinued   Bridging: No   INR goal change: No      Medication changes affecting anticoagulation: No    Additional factors affecting anticoagulation: Yes: patient is discharging on hospice, all medications discontinued.     PLAN     Discharge from [facility]    Patient not contacted    BEVERLEY LOONEY RN

## 2024-02-23 NOTE — PROGRESS NOTES
[facility]    Progress Note - [facility]    ______________________________________________________________________    [facility] 24/7 Contact Number: [phone]    - Providers: Please contact [facility] with changes in orders or clinical plan of care   - Nursing: Please contact [facility] with significant changes in patient condition  ______________________________________________________________________        Plan of Care Discussed with the Following:   - Nurse: BYRON Gorman  - Hospitalist/Rounding Provider: Dr. Mckinley Ibarra              - Noe's Family/Preferred Contact: Rica Levine (Significant other)   - Hospice Provider: Dr. Landrum    Summary of Visit (includes assessment findings, discharge planning progress/status or any new orders):   Dr. Ibarra was able to flip the chart today.  Spoke to BYRON Gorman, no changes today. Family prefers not moving him. It is ok from us to not move him.   Assessed Sha in his hospital room, he is curled up on his right side as he was yesterday. He has swelling in his left arm so I raised it to see if it will resolve again. Left hand was weeping from one small spot, applied foam island dressing and alerted BYRON Gorman. Swelling looks positional vs IV issue. Sha is more withdrawn and no longer opens his eyes. Continues on IV morphine drip and now has weeping edema in addition to his wounds.     Plan: continue GIP level of care for multi system failures requiring nurse assessment    Goal: maintain pain management as evidenced by no grimace maintained for 48 hours. New goal: edema managed as evidenced by no additional sites of weeping while GIP level of care    Discharge: currently requires GIP level of care due to complex wound care on 7 pressure wounds and 1 skin tear, weeping edema, and morphine drip for pain management. Would need skilled nursing if discharged.       Katelynn Gipson RN

## 2024-02-23 NOTE — PHARMACY-ADMISSION MEDICATION HISTORY
Admission medication history completed at [facility]. Please see Pharmacist Admission Medication History note from 2/22/2024.

## 2024-02-23 NOTE — PROGRESS NOTES
ANTICOAGULATION  MANAGEMENT    Noe Florence is being discharged from the [facility]ticoagulation [facility] ([facility]).    Reason for discharge:  discharging on hospice, all medications discontinued     Anticoagulation episode resolved, [facility] referral closed, and Standing order discontinued    If patient needs warfarin management in the future, please send a new referral    BEVERLEY LOONEY RN

## 2024-02-23 NOTE — DISCHARGE SUMMARY
[facility]  Hospitalist Discharge Summary      Date of Admission:  2/15/2024  Date of Discharge:  2/23/2024  Discharging Provider: Mckinley Ibarra MD  Discharge Service: Hospitalist Service, GOLD TEAM 19    Discharge Diagnoses   Hospice (comfort) Care  Severe oropharyngeal dysphagia, profound hypernatremia, ARSALAN with uremia, severe malnutrition, metabolic encephalopathy with baseline dementia       Clinically Significant Risk Factors     # Moderate Malnutrition: based on nutrition assessment            Discharge Disposition   Discharged to Inpatient Hospice.   Condition at discharge: Terminal    Hospital Course         Noe Florence is a 88 year old male with PMH notable for CKD3 (baseline Cr ~1.4-1.8), Afib, CAD with past CABG and stents, ischemic cardiomyopathy, polymorphic V-tach s/p ICD, dementia, monoclonal gammopathy of unknown significance, polyneuropathy, L common iliac aneurysm, polymyalgia rheumatica, past colon cancer, recent treatment for L 2nd toe osteomyelitis. Admitted to [facility] ICU for sepsis, AMS, ARSALAN, and profound hypernatremia in the setting of failure to thrive.   Now in comfort cares. Inpatient hospice team consulted, following.            Today's changes:   Patient in comfort care. Currently appears resting comfortably. Now on morphine drip.   Family at bedside.   No acute concern.  Appreciate Hospice team consult, following.   Discharging and readmitting to inpatient hospice.         Severe oropharyngeal dysphagia, profound hypernatremia, ARSALAN with uremia, severe malnutrition, metabolic encephalopathy with baseline dementia:   Failure to thrive with progressive decline over the past few years, accelerated over the past few months.  Patient has been living with and cared for by his significant other Rica, with progressive decline upon returning home from the hospital when he was treated for osteomyelitis of the toe 12/2023.  He has required a  Lupillo lift and in-home help twice a day to assist with basic cares.  More recently Rica and patient's son See note that he has had increasing difficulty swallowing, pocketing food and not able to tolerate oral nutritional or liquid intake.  He become progressively weak and bedridden.  Initial labs upon admission to the hospital 2/15/2024 significant for sodium 164, , bicarb 18, creatinine 2.73, with baseline creatinine 1.4-1.8.  , troponin 140 with subsequent downward trend, elevated BNP, WBC 21, hemoglobin 10.3.  Head CT showed old ischemic changes and moderate generalized volume loss with leukoaraiosis.  CXR stable with perihilar and bibasilar streaky/patchy opacities.  1 of 2 blood cultures positive for Staph epidermidis and Enterococcus faecalis.  Repeat blood cultures 2/16/2024 have been negative to date.  History of recently treated left toe osteomyelitis, had been seen by infectious disease 1/31/2024 following an extended course of antibiotics, and antibiotics have been stopped at that time.  The patient was admitted initially to the ICU, treated with hypotonic IV fluids for severe hypernatremia, ARSALAN, supplemental oxygen and empiric IV antibiotics with Zosyn and vancomycin.  Infectious disease was consulted.  Hypernatremia, ARSALAN subsequently improved with hypotonic fluids, with some transient improvement in encephalopathy, but continued severe dysphagia for which he was assessed by ST and unable to manage his own secretions let alone oral intake.  Subsequently developed worsening volume overload with CHF, confirmed by markedly elevated BNP in the 20,000's, CXR 2/18 showing vascular congestion despite sodium being still slightly elevated at 147, necessitating discontinuation of hypotonic IV fluids.    Palliative care has been following in the hospital and met with patient's significant other Rica and patient's son See on 2/16, with goals of care discussions including DNR/DNI  "and consideration of hospice.     MD Met with patient's significant other Rica, and his son See for an extended family conference on 2/18 to review clinical course and goals of care.  Rica notes that his quality of life has been very poor for the past year, particularly since December following his hospitalization for osteomyelitis.  She has been caring for him at home with the help of aides in the morning and in the evening.  He has required a Lupillo lift, had previously been using a Nicci steady at home prior to that hospitalization.  His hospital bed is in the living room.  Rica and See have noticed increased difficulty swallowing over the past few weeks with him pocketing food and taking very little oral intake.  See agrees that his father's quality of life \"is poor\".  See feels that his father's progressive dementia over the past few years has contributed to his decline.  We discussed the competing ongoing issue of worsening CHF despite persistent hypernatremia, he has persistent severe dysphagia despite improvement in his hypernatremia, his baseline poor quality of life and the unlikelihood that we would experience any meaningful recovery with ongoing aggressive medical care.  Rica and See both expressed a desire to transition care focus to comfort and initiate hospice care.  Dw Dr. Winston of palliative care, who recommended general inpatient hospice consultation given the above.Discontinued IV fluids, lab draws, antibiotics, Coumadin, Accu-Cheks     - GIP consulted, comfort cares orders placed.   - Hospice team closely following, appreciate input.   - On morphine drip plus prn morphine iv/SL for pain control now besides other comfort care orders.      CAD, ICM, H/O Vtach s/p ICD, H/O Afib, Acute on chronic systolic CHF:  ECHO 2/15/2024: EF 45-50%, previous EF 55-60% 1/30/2017.    Transitioned to comfort cares with hospice.  -ICD deactivated     CKD, stage III " with ARSALAN:  Baseline creatinine 1.4-1.8.       Sepsis with history of osteomyelitis of the left second toe:  Had been hospitalized 12/2023 for osteomyelitis of the left second toe.  Received an extended 7.5 week course of antibiotics.  Had follow-up outpatient with infectious disease 1/31/2024, antibiotics discontinued.  At hospital admission noted to have elevated CRP, leukocytosis in the setting of ARSALAN, severe hypernatremia and uremia.  1 of 2 blood cultures 2/15 grew Staph epidermidis and Enterococcus faecalis.  Treated with IV Zosyn and vancomycin initially.  Subsequent blood culture 2/16 has been negative to date.  Leukocytosis resolving.  Has ongoing left toe and foot wounds as well as multiple other pressure sores for which wound care nursing is following.  Infectious disease has been following in the hospital.     -Family in agreement with discontinuing antibiotics and further lab draws.        Chronic anemia, thrombocytopenia, MGUS:  Transfused 1 unit for hemoglobin 6.9 on 2/17, subsequent worsening volume overload.  CBC stable 2/18/2024.    -no further lab draws     History of DVT:  Transitioned to hospice, comfort cares.  Discontinued Coumadin, INR draws.     Lymphedema:  Symptomatic management at this point.     Chronic left foot, right lateral malleoli are wounds with history of left second toe osteomyelitis, recently treated with new/more recent onset pressure ulcers:  As above, discontinued antibiotics, lab draws.    - Continue current wound cares, optimize comfort.  Pain control as above.      History of left common iliac artery aneurysm:        Diet: Advance Diet as Tolerated: Regular Diet Adult    DVT Prophylaxis: now comfort cares 2/18/24, coumadin stopped.     Consultations This Hospital Stay   WOUND OSTOMY CONTINENCE NURSE  IP CONSULT  NURSING TO CONSULT FOR VASCULAR ACCESS CARE IP CONSULT  PHARMACY TO DOSE VANCO  VASCULAR ACCESS FOR PICC PLACEMENT ADULT IP CONSULT  SPIRITUAL HEALTH SERVICES IP  CONSULT  PHYSICAL THERAPY ADULT IP CONSULT  OCCUPATIONAL THERAPY ADULT IP CONSULT  NUTRITION SERVICES ADULT IP CONSULT  PHARMACY TO DOSE VANCO  SPIRITUAL HEALTH SERVICES IP CONSULT  WOUND OSTOMY CONTINENCE NURSE  IP CONSULT  SPEECH LANGUAGE PATH ADULT IP CONSULT  PHARMACY TO DOSE WARFARIN  PALLIATIVE CARE ADULT IP CONSULT  CARE MANAGEMENT / SOCIAL WORK IP CONSULT  INFECTIOUS DISEASE [facility] ADULT IP CONSULT  PHARMACY TO DOSE VANCO  CARE MANAGEMENT / SOCIAL WORK IP CONSULT  NUTRITION SERVICES ADULT IP CONSULT  PHARMACY IP CONSULT  RADIOLOGY IP CONSULT  SPIRITUAL HEALTH SERVICES IP CONSULT  GIP INPATIENT HOSPICE ADULT CONSULT  SPIRITUAL HEALTH SERVICES IP CONSULT  SPIRITUAL HEALTH SERVICES IP CONSULT  SPIRITUAL HEALTH SERVICES IP CONSULT    Code Status   Prior    Time Spent on this Encounter   IMckinley MD, personally saw the patient today and spent greater than 30 minutes discharging this patient.       Mckinley Ibarra MD  [facility] MED SURG  [address] [place] [id number]  Phone: [phone]  Fax: [phone]  ______________________________________________________________________    Physical Exam   Vital Signs:       Pulse: 60   Resp: 19 SpO2: 98 % O2 Device: Oxymask Oxygen Delivery: 1 LPM  Weight: 150 lbs 5.66 oz       General Appearance:  Resting comfortably with eyes closed.   Respiratory: Normal work of breathing.  Cardiovascular: not examined.   GI: not examined.   Extremities: Dependent edema present.   Neuro: sleeping         Primary Care Physician   Roberto Sarmiento    Discharge Orders      Reason for your hospital stay    Hospice Care.     Activity - Up with assistive device     Wound care (specify)    Per wocn.     Fall precautions     Diet    Follow this diet upon discharge: Orders Placed This Encounter      Advance Diet as Tolerated: Regular Diet Adult       Significant Results and Procedures   Most Recent 3 CBC's:  Recent Labs   Lab Test 02/18/24  0574  02/17/24  0420 02/16/24  0526   WBC 11.1* 12.8* 13.4*   HGB 8.4* 6.9* 7.5*   MCV 98 98 100   * 126* 127*     Most Recent 3 BMP's:  Recent Labs   Lab Test 02/18/24  1207 02/18/24  1159 02/18/24  0845 02/18/24  0553 02/18/24  0032 02/17/24  1956 02/17/24  0424 02/17/24  0420   NA  --  147*  --  147*  147*  --  148*  148*   < > 152*  152*   POTASSIUM  --   --   --  3.3*  --  3.4  --  3.8   CHLORIDE  --   --   --  121*  --  121*  --  126*   CO2  --   --   --  16*  --  18*  --  17*   BUN  --   --   --  36.1*  --  44.5*  --  62.2*   CR  --   --   --  1.18*  --  1.31*  --  1.60*   ANIONGAP  --   --   --  10  --  9  --  9   KEITH  --   --   --  7.8*  --  7.8*  --  7.9*   GLC 88  --  101* 96   < > 135*   < > 97    < > = values in this interval not displayed.     Most Recent 2 LFT's:  Recent Labs   Lab Test 02/16/24  0119 02/15/24  1250   AST 35 28   ALT 18 15   ALKPHOS 52 57   BILITOTAL 0.4 0.3     7-Day Micro Results       No results found for the last 168 hours.        ,   Results for orders placed or performed during the hospital encounter of 02/15/24   XR Chest Port 1 View    Narrative    XR CHEST PORT 1 VIEW  2/15/2024 1:15 PM     HISTORY:  dyspnea       COMPARISON:  12/11/2023 CXR    TECHNIQUE: Portable, semiupright, frontal projection radiograph of the  chest.    FINDINGS:   Implantable cardiac defibrillator projects over the left upper chest  with stable positioning of right atrial lead and right ventricular  shock coil. Mitral valve prosthesis. Postsurgical changes of the chest  with median sternotomy wires intact.    Cardiac mediastinal silhouette is stable. The trachea is midline. No  appreciable pneumothorax. Streaky opacities of the lower lungs and  perihilar regions. No focal pulmonary consolidations.     Visualized upper abdomen is unremarkable. No acute osseous  abnormalities.      Impression    IMPRESSION:  Unchanged perihilar and bibasilar streaky/patchy opacity. No  appreciable pneumothorax or  acute pulmonary consolidations.    I have personally reviewed the examination and initial interpretation  and I agree with the findings.    AFIA DANIELS MD         SYSTEM ID:  [id number]   Head CT w/o contrast    Narrative    CT HEAD W/O CONTRAST 2/15/2024 2:11 PM    History: AMS     Comparison: Head CT from 9/11/2016.    Technique: Using multidetector thin collimation helical acquisition  technique, axial, coronal and sagittal CT images from the skull base  to the vertex were obtained without intravenous contrast.   (topogram) image(s) also obtained and reviewed.    Findings:   Images are degraded aided by motion artifact.  There is no intracranial hemorrhage, mass effect, or midline shift.  Gray/white matter differentiation in both cerebral hemispheres is  preserved. Ventricles are proportionate to the cerebral sulci.  Moderate generalized parenchymal volume loss. White matter hyper  attenuation, most likely represents chronic small vessel ischemic  disease. The right perirolandic chronic insult (series 3 image 16).  The basal cisterns are clear. Arterial consultations.    The bony calvaria and the bones of the skull base are normal. The  visualized portions of the paranasal sinuses are clear. Right mastoid  and middle ear cavity effusion. Bilateral pseudophakia.      Impression    Impression:  1. No acute intracranial pathology.   2. Moderate generalized parenchymal volume loss and leukoaraiosis.   3. Right perirolandic chronic insult.  4. Right mastoid and middle ear cavity effusion.    THOMAS MCHUGH MD         SYSTEM ID:  [id number]   US Renal Complete Non-Vascular    Narrative    EXAMINATION: US RENAL COMPLETE NON-VASCULAR, 2/16/2024 8:21 AM     COMPARISON: CT abdomen and pelvis dated 12/6/2023.    HISTORY: ARSALAN    TECHNIQUE: The kidneys and bladder were scanned in the standard  fashion with specialized ultrasound transducer(s) using both gray  scale and limited color/spectral Doppler  techniques.    FINDINGS:    Right kidney: Measures 11.1 cm in length. Parenchyma is of normal  thickness and echogenicity. No focal mass. No hydronephrosis.    Left kidney: Measures 10 cm in length. Parenchyma is of normal  thickness and echogenicity. 1.9 x 1.7 x 1.8 cm anechoic cyst noted in  the upper pole. No hydronephrosis.     Bladder: Decompressed by Dominguez.      Impression    IMPRESSION:  1.  No hydronephrosis.  2.  Simple left renal cyst.    DORIS BUSTAMANTE MD         SYSTEM ID:  [id number]   XR Abdomen Port 1 View    Narrative    EXAMINATION:  XR ABDOMEN PORT 1 VIEW 2/16/2024 5:39 PM     COMPARISON: 12/11/2023.    HISTORY: NG placement.    TECHNIQUE: Frontal view of the abdomen.    FINDINGS: No enteric tube is visualized. No abnormally dilated loops  of bowel. No pneumatosis or portal venous gas. Prominent infected  rectal stool. Left hip fixation hardware.       Impression    IMPRESSION: No enteric tube is visualized.     Findings were discussed with Matt Dunn RN by Dr. Hernandes at 9:10  PM on 2/16/2024. Patient does not currently have an NG tube. Recent  unsuccessful placement attempt.    I have personally reviewed the examination and initial interpretation  and I agree with the findings.    AFIA DANIELS MD         SYSTEM ID:  [id number]   XR Chest Port 1 View    Narrative    Examination:  XR CHEST PORT 1 VIEW    Date:  2/18/2024 9:54 AM     Clinical Information: assess for aspiration pneumonitis     Comparison: 2/15/2024.    Findings:   Single AP chest radiograph. Stable left chest ICD. Postsurgical  changes of the chest with intact median sternotomy. Mitral valve  prosthesis. Trachea is midline. Stable cardiomediastinal silhouette.  Low lung volumes with slightly increased mixed perihilar, retrocardiac  and bibasilar opacities. No significant pleural effusion. No  appreciable pneumothorax. Osteopenic bones. Upper abdomen is normal.      Impression    Impression:  Low lung volumes with slightly increased  mixed perihilar and  retrocardiac opacities, favor edema/atelectasis.     DORIS BUSTAMANTE MD         SYSTEM ID:  [id number]   Echo Complete     Value    LVEF  45-50% (mildly reduced)    [facility]    [id number]  [id number]  [id number]  [id number]^JULIO CESAR^JENNIFER^THOMAS     [facility],[place]  Echocardiography Laboratory  [address] [place] [address]     Name: LEROY MACHADO  MRN: [id number]  : 1935  Study Date: 02/15/2024 02:55 PM  Age: 88 yrs  Gender: Male  Patient Location: [facility]  Reason For Study: Acute Myocardial Infarction  Ordering Physician: JENNIFER HARRELL  Performed By: Jessica Cedeno     BSA: 1.8 m2  Height: 67 in  Weight: 155 lb  HR: 62  BP: 126/80 mmHg  ______________________________________________________________________________  Procedure  Complete Portable Echo Adult. Contrast Optison. Optison (NDC #1366-1456-78)  given intravenously. Patient was given 6 ml mixture of 3 ml Optison and 6 ml  saline. 3 ml wasted.  ______________________________________________________________________________  Interpretation Summary  Left ventricular cavity size is small. Left ventricular function is decreased.  The ejection fraction is 45-50% (mildly reduced).  Global right ventricular function is mildly reduced.  An annuloplasty ring is noted in the mitral position. There i no stenosis or  regurgitation.  Estimated mean right atrial pressure is normal.  No pericardial effusion is present.     ______________________________________________________________________________  Left Ventricle  Left ventricular cavity size is small. Mild to moderate concentric wall  thickening consistent with left ventricular hypertrophy is present. Left  ventricular function is decreased. The ejection fraction is 45-50% (mildly  reduced).     Right Ventricle  The right ventricle is normal size. Global right ventricular function is  mildly reduced. A pacemaker lead is noted in the right ventricle.      Atria  The right atria appears normal. Mild left atrial enlargement is present.     Mitral Valve  An annuloplasty ring is noted in the mitral position. The mean gradient across  the mitral valve is 2 mmHg.     Aortic Valve  The aortic valve is tricuspid. Mild aortic valve calcification is present.     Tricuspid Valve  Mild tricuspid insufficiency is present. The right ventricular systolic  pressure is approximated at 23.0 mmHg plus the right atrial pressure.  Pulmonary artery systolic pressure is normal.     Pulmonic Valve  On Doppler interrogation, there is no significant stenosis or regurgitation.     Vessels  The aorta root is normal. The inferior vena cava was normal in size with  preserved respiratory variability. Estimated mean right atrial pressure is  normal.     Pericardium  No pericardial effusion is present.     ______________________________________________________________________________  MMode/2D Measurements & Calculations  IVSd: 1.4 cm  LVIDd: 4.1 cm  LVIDs: 2.8 cm  LVPWd: 1.3 cm  FS: 31.7 %     LV mass(C)d: 203.9 grams  LV mass(C)dI: 112.4 grams/m2  Ao root diam: 3.4 cm  asc Aorta Diam: 3.1 cm  LVOT diam: 2.3 cm  LVOT area: 4.1 cm2  Ao root diam index Ht(cm/m): 2.0  Ao root diam index BSA (cm/m2): 1.9  Asc Ao diam index BSA (cm/m2): 1.7  Asc Ao diam index Ht(cm/m): 1.8  LA Volume (BP): 71.8 ml  LA Volume Index (BP): 39.7 ml/m2     RWT: 0.61     Doppler Measurements & Calculations  MV E max gabe: 69.7 cm/sec  MV A max gabe: 111.1 cm/sec  MV E/A: 0.63  MV dec slope: 253.8 cm/sec2  MV dec time: 0.27 sec  Ao V2 max: 111.0 cm/sec  Ao max P.9 mmHg  Ao V2 mean: 61.0 cm/sec  Ao mean P.7 mmHg  Ao V2 VTI: 18.0 cm  MARIA VICTORIA(I,D): 4.5 cm2  MARIA VICTORIA(V,D): 3.6 cm2  LV V1 max PG: 3.8 mmHg  LV V1 max: 97.5 cm/sec  LV V1 VTI: 19.6 cm  SV(LVOT): 80.6 ml  SI(LVOT): 44.4 ml/m2  PA acc time: 0.09 sec  TR max gabe: 240.0 cm/sec  TR max P.0 mmHg  AV Gabe Ratio (DI): 0.88  MARIA VICTORIA Index (cm2/m2): 2.5  E/E' avg: 10.0  Lateral  E/e': 9.4  [facility] E/e': 10.7     ______________________________________________________________________________  Report approved by: Evelio Delaney 02/15/2024 05:24 PM           *Note: Due to a large number of results and/or encounters for the requested time period, some results have not been displayed. A complete set of results can be found in Results Review.       Discharge Medications   Discharge Medication List as of 2/23/2024 11:16 AM        CONTINUE these medications which have NOT CHANGED    Details   COMPRESSION STOCKINGS 1 each daily, Disp-1 each, R-4, E-Prescribe      Emollient (CERAVE SA RENEWING) LOTN 1 application, topical, Once A Day, Apply to torsoHistorical      Emollient (CERAVE) CREA 1 application, topical, Once A Day, Apply to bilateral legsHistorical      Misc. Devices (PILL ) MISC 1 each as needed (medication administration), Disp-1 each, R-0, E-Prescribe           STOP taking these medications       acetaminophen (TYLENOL) 500 MG tablet Comments:   Reason for Stopping:         alendronate (FOSAMAX) 70 MG tablet Comments:   Reason for Stopping:         amoxicillin-clavulanate (AUGMENTIN) 500-125 MG tablet Comments:   Reason for Stopping:         bacitracin 500 UNIT/GM ophthalmic ointment Comments:   Reason for Stopping:         baclofen (LIORESAL) 10 MG tablet Comments:   Reason for Stopping:         calcium carbonate 500 mg, elemental, (OSCAL;OYSTER SHELL CALCIUM) 500 MG tablet Comments:   Reason for Stopping:         cholecalciferol 1000 units TABS Comments:   Reason for Stopping:         cyanocobalamin (VITAMIN B-12) 1000 MCG tablet Comments:   Reason for Stopping:         diclofenac (VOLTAREN) 1 % topical gel Comments:   Reason for Stopping:         doxycycline hyclate (VIBRAMYCIN) 100 MG capsule Comments:   Reason for Stopping:         ferrous sulfate (FE TABS) 325 (65 Fe) MG EC tablet Comments:   Reason for Stopping:         ketoconazole (NIZORAL) 2 % external cream Comments:    Reason for Stopping:         ketoconazole (NIZORAL) 2 % external cream Comments:   Reason for Stopping:         ketoconazole (NIZORAL) 2 % external shampoo Comments:   Reason for Stopping:         lactobacillus rhamnosus, GG, (CULTURELL) capsule Comments:   Reason for Stopping:         metoprolol tartrate (LOPRESSOR) 25 MG tablet Comments:   Reason for Stopping:         mirtazapine (REMERON) 15 MG tablet Comments:   Reason for Stopping:         Multiple Vitamins-Minerals (MULTIVITAMIN ADULT PO) Comments:   Reason for Stopping:         polyethylene glycol (MIRALAX) 17 GM/Dose powder Comments:   Reason for Stopping:         risperiDONE (RISPERDAL) 0.5 MG tablet Comments:   Reason for Stopping:         sennosides (SENOKOT) 8.6 MG tablet Comments:   Reason for Stopping:         sertraline (ZOLOFT) 100 MG tablet Comments:   Reason for Stopping:         tamsulosin (FLOMAX) 0.4 MG capsule Comments:   Reason for Stopping:         urea (GORMEL) 20 % external cream Comments:   Reason for Stopping:         warfarin ANTICOAGULANT (JANTOVEN ANTICOAGULANT) 5 MG tablet Comments:   Reason for Stopping:             Allergies   Allergies   Allergen Reactions    Latex Rash     Rash

## 2024-02-23 NOTE — H&P
[facility]    History and Physical - Hospitalist Service, GOLD TEAM 19       Date of Admission:  2/23/2024    Assessment & Plan      Noe Florence is a 88 year old male admitted on 2/23/2024.     Noe Florence is a 88 year old male with PMH notable for CKD3 (baseline Cr ~1.4-1.8), Afib, CAD with past CABG and stents, ischemic cardiomyopathy, polymorphic V-tach s/p ICD, dementia, monoclonal gammopathy of unknown significance, polyneuropathy, L common iliac aneurysm, polymyalgia rheumatica, past colon cancer, recent treatment for L 2nd toe osteomyelitis. Admitted to [facility] ICU for sepsis, Acute encephalopathy, ARSALAN, and profound hypernatremia, severe dysphagia, severe malnutrition, failure to thrive.  Now in hospice/comfort cares. Inpatient hospice team consulted, following.         Today's changes:   Patient in comfort care. Comfort care order set in place.   Currently appears resting comfortably. On morphine drip.   Family at bedside.   No acute concern.  Appreciate Hospice team consult, following.         Severe oropharyngeal dysphagia, profound hypernatremia, ARSALAN with uremia, severe malnutrition, metabolic encephalopathy with baseline dementia:   Failure to thrive with progressive decline over the past few years, accelerated over the past few months.  Patient has been living with and cared for by his significant other Rica, with progressive decline upon returning home from the hospital when he was treated for osteomyelitis of the toe 12/2023.  He has required a [place] lift and in-home help twice a day to assist with basic cares.  More recently Rica and patient's son See note that he has had increasing difficulty swallowing, pocketing food and not able to tolerate oral nutritional or liquid intake.  He become progressively weak and bedridden.  Initial labs upon admission to the hospital 2/15/2024 significant for sodium 164, , bicarb 18,  creatinine 2.73, with baseline creatinine 1.4-1.8.  , troponin 140 with subsequent downward trend, elevated BNP, WBC 21, hemoglobin 10.3.  Head CT showed old ischemic changes and moderate generalized volume loss with leukoaraiosis.  CXR stable with perihilar and bibasilar streaky/patchy opacities.  1 of 2 blood cultures positive for Staph epidermidis and Enterococcus faecalis.  Repeat blood cultures 2/16/2024 have been negative to date.  History of recently treated left toe osteomyelitis, had been seen by infectious disease 1/31/2024 following an extended course of antibiotics, and antibiotics have been stopped at that time.  The patient was admitted initially to the ICU, treated with hypotonic IV fluids for severe hypernatremia, ARSALAN, supplemental oxygen and empiric IV antibiotics with Zosyn and vancomycin.  Infectious disease was consulted.  Hypernatremia, ARSALAN subsequently improved with hypotonic fluids, with some transient improvement in encephalopathy, but continued severe dysphagia for which he was assessed by ST and unable to manage his own secretions let alone oral intake.  Subsequently developed worsening volume overload with CHF, confirmed by markedly elevated BNP in the 20,000's, CXR 2/18 showing vascular congestion despite sodium being still slightly elevated at 147, necessitating discontinuation of hypotonic IV fluids.    Palliative care has been following in the hospital and met with patient's significant other Rica and patient's son See on 2/16, with goals of care discussions including DNR/DNI and consideration of hospice.     MD Met with patient's significant other Rica, and his son See for an extended family conference on 2/18 to review clinical course and goals of care.  Rica notes that his quality of life has been very poor for the past year, particularly since December following his hospitalization for osteomyelitis.  She has been caring for him at home with the help  "of aides in the morning and in the evening.  He has required a [place] lift, had previously been using a [place]ci steady at home prior to that hospitalization.  His hospital bed is in the living room.  Rica and See have noticed increased difficulty swallowing over the past few weeks with him pocketing food and taking very little oral intake.  See agrees that his father's quality of life \"is poor\".  See feels that his father's progressive dementia over the past few years has contributed to his decline.  We discussed the competing ongoing issue of worsening CHF despite persistent hypernatremia, he has persistent severe dysphagia despite improvement in his hypernatremia, his baseline poor quality of life and the unlikelihood that we would experience any meaningful recovery with ongoing aggressive medical care.  Rica and See both expressed a desire to transition care focus to comfort and initiate hospice care.  Dw Dr. Winston of palliative care, who recommended general inpatient hospice consultation given the above.Discontinued IV fluids, lab draws, antibiotics, Coumadin, Accu-Cheks     - GIP consulted, comfort cares orders placed.   - Hospice team closely following, appreciate input.   - On morphine drip plus prn morphine iv/SL for pain control now besides other comfort care orders.      CAD, ICM, H/O Vtach s/p ICD, H/O Afib, Acute on chronic systolic CHF:  ECHO 2/15/2024: EF 45-50%, previous EF 55-60% 1/30/2017.    Transitioned to comfort cares with hospice.  -ICD deactivated     CKD, stage III with ARSALAN:  Baseline creatinine 1.4-1.8.       Sepsis with history of osteomyelitis of the left second toe:  Had been hospitalized 12/2023 for osteomyelitis of the left second toe.  Received an extended 7.5 week course of antibiotics.  Had follow-up outpatient with infectious disease 1/31/2024, antibiotics discontinued.  At hospital admission noted to have elevated CRP, leukocytosis in the setting of " ARSALAN, severe hypernatremia and uremia.  1 of 2 blood cultures 2/15 grew Staph epidermidis and Enterococcus faecalis.  Treated with IV Zosyn and vancomycin initially.  Subsequent blood culture 2/16 has been negative to date.  Leukocytosis resolving.  Has ongoing left toe and foot wounds as well as multiple other pressure sores for which wound care nursing is following.  Infectious disease has been following in the hospital.     -Family in agreement with discontinuing antibiotics and further lab draws.        Chronic anemia, thrombocytopenia, MGUS:  Transfused 1 unit for hemoglobin 6.9 on 2/17, subsequent worsening volume overload.  CBC stable 2/18/2024.    -no further lab draws     History of DVT:  Transitioned to hospice, comfort cares.  Discontinued Coumadin, INR draws.     Lymphedema:  Symptomatic management at this point.     Chronic left foot, right lateral malleoli are wounds with history of left second toe osteomyelitis, recently treated with new/more recent onset pressure ulcers:  As above, discontinued antibiotics, lab draws.    - Continue current wound cares, optimize comfort.  Pain control as above.      History of left common iliac artery aneurysm:                 Diet: Advance Diet as Tolerated: Regular Diet Adult  DVT Prophylaxis:  Hospice care.   Dominguez Catheter: Not present  Lines: None     Cardiac Monitoring: None  Code Status:  dnr, dni.     Clinically Significant Risk Factors Present on Admission                  # Hypertension: Noted on problem list          # Financial/Environmental Concerns:     # ICD device present       Disposition Plan      TBD. Hospice care.        Mckinley Ibarra MD  Hospitalist Service, GOLD TEAM [address] [place] [address]  Securely message with [employer] (more info)  Text page via [employer] Paging/Directory   See signed in provider for up to date coverage  information    ______________________________________________________________________    Chief Complaint   Hospice care.     History is obtained from the electronic health record and patient's spouse    History of Present Illness     Noe Florence is a 88 year old male with PMH notable for CKD3 (baseline Cr ~1.4-1.8), Afib, CAD with past CABG and stents, ischemic cardiomyopathy, polymorphic V-tach s/p ICD, dementia, monoclonal gammopathy of unknown significance, polyneuropathy, L common iliac aneurysm, polymyalgia rheumatica, past colon cancer, recent treatment for L 2nd toe osteomyelitis. Admitted to [facility] ICU for sepsis, Acute encephalopathy, ARSALAN, and profound hypernatremia, severe dysphagia, severe malnutrition, failure to thrive.  Now in hospice/comfort cares. Inpatient hospice team consulted, following.   See above for the details.     Family at bedside. Patient laying on bed, on oxymask, with eyes closed. Appears comfortable. Non verbal. Breathing non labored. UEs edematous.   No new concern reported.       Past Medical History    Past Medical History:   Diagnosis Date    Advanced open-angle glaucoma     Atrial fibrillation (H)     CKD (chronic kidney disease), stage III (H) 2005    Colon cancer (H)     Stage II-B colon cancer    Coronary artery disease     s/p CABG x 2, JEREMY x 2    Diverticulitis     Hyperlipidemia     Hypertension     Ischemic cardiomyopathy     MGUS (monoclonal gammopathy of unknown significance)     Nonsenile cataract     BE    Osteoporosis     left hip fracture    Polymorphic ventricular tachycardia (H)     Polymyalgia rheumatica (H24)     PVD (posterior vitreous detachment), both eyes 2005    S/P ablation of atrial flutter 6/20/14    CTI    Stented coronary artery     SVT (supraventricular tachycardia)     PPM/AICD for NSVT    Upper leg DVT (deep venous thromboembolism), chronic (H)     Left    Weight loss, unintentional 2017    15 lb in 4 months       Past Surgical History   Past  Surgical History:   Procedure Laterality Date    CATARACT IOL, RT/LT Bilateral     COLONOSCOPY  3/13/2014    Procedure: COMBINED COLONOSCOPY, SINGLE BIOPSY/POLYPECTOMY BY BIOPSY;;  Surgeon: Mary Gerber MD;  Location: [facility]    COLONOSCOPY N/A 6/22/2015    Procedure: COLONOSCOPY;  Surgeon: Marilin Newman MD;  Location: [facility]    COLONOSCOPY N/A 11/7/2018    Procedure: COMBINED COLONOSCOPY, SINGLE OR MULTIPLE BIOPSY/POLYPECTOMY BY BIOPSY;  Surgeon: Roberto Esteban MD;  Location: [facility]    EP ICD GENERATOR REPLACEMENT DUAL N/A 12/11/2018    Procedure: EP ICD Generator Change Dual;  Surgeon: Deedee Baird MD;  Location:  HEART CARDIAC CATH LAB    H ABLATION ATRIAL FLUTTER      HEART CATH DRUG ELUTING STENT PLACEMENT  4/19/2012    JEREMY x 2 to LCx    IMPLANT IMPLANTABLE CARDIOVERTER DEFIBRILLATOR  5-    ppm/aicd    INSERT ARTERIAL LINE  2/16/2024    KNEE SURGERY      right and left knee surgeries    LAPAROSCOPIC ASSISTED COLECTOMY Right 2/1/2019    Procedure: Laparoscopic Converted to Open Transverse Colectomy with Lysis of Adhesions;  Surgeon: Chanelle Guzmán MD;  Location:  OR    LAPAROSCOPIC ASSISTED COLECTOMY LEFT (DESCENDING)  4/8/2014    Procedure: LAPAROSCOPIC ASSISTED COLECTOMY LEFT (DESCENDING);  Hand assisted Laparoscopic Sigmoid Colectomy , Laparoscopic mobilization of spleenic flexure *Latex Allergy*Anesthesia General with Block;  Surgeon: Chanelle Guzmán MD;  Location: [facility]    OPEN REDUCTION INTERNAL FIXATION RODDING INTRAMEDULLAR FEMUR FRACTURE TABLE Left 3/14/2019    Procedure: Open Reduction Internal Fixation Left Femur Intramedullar Nailing;  Surgeon: Srikanth Avalos MD;  [facility]: [facility]    REPAIR VALVE MITRAL  2006     30-mm Medtronic Julian ring     SELECTIVE LASER TRABECULOPLASTY (SLT) OD (RIGHT EYE)  4/10, 1/12,+1/9/13    RE    SELECTIVE LASER TRABECULOPLASTY (SLT) OS (LEFT EYE)  5/2012    SHOULDER SURGERY      right rotator cuff     [facility] CABG, ARTERY-VEIN, FOUR      LIMA-LAD, SVG-Rt PDA, SVG-OM2, SVG-Diag 1    [facility] CABG, VEIN, SINGLE      1-vessel CABG, SVG->PDRCA        Prior to Admission Medications   Prior to Admission Medications   Prescriptions Last Dose Informant Patient Reported? Taking?   COMPRESSION STOCKINGS   No No   Si each daily   Emollient (CERAVE SA RENEWING) LOTN   Yes No   Si application, topical, Once A Day, Apply to torso   Emollient (CERAVE) CREA   Yes No   Si application, topical, Once A Day, Apply to bilateral legs   Misc. Devices (PILL ) MISC   No No   Si each as needed (medication administration)   morphine 1 mg/mL infusion (std conc) ADULT/PEDS GREATER than 20 kg   Yes No   Sig: Inject 3 mg/hr into the vein continuous      Facility-Administered Medications: None        Review of Systems    Review of systems not obtained due to patient factors - critical condition and mental status    Social History   I have reviewed this patient's social history and updated it with pertinent information if needed.  Social History     Tobacco Use    Smoking status: Former     Types: Cigarettes, Cigars     Quit date: 1968     Years since quittin.1    Smokeless tobacco: Never   Vaping Use    Vaping Use: Never used   Substance Use Topics    Alcohol use: Not Currently    Drug use: No         Family History   I have reviewed this patient's family history and updated it with pertinent information if needed.  Family History   Problem Relation Age of Onset    C.A.D. Father     Anesthesia Reaction No family hx of     Crohn's Disease No family hx of     Ulcerative Colitis No family hx of     Cancer - colorectal No family hx of     Macular Degeneration No family hx of     Cancer No family hx of         No known family hx of skin cancer    Melanoma No family hx of     Skin Cancer No family hx of          Allergies   Allergies   Allergen Reactions    Latex Rash     Rash        Physical Exam   Vital Signs:                     Weight: 0 lbs 0 oz    General Appearance: Resting comfortably, eyes closed. Face mask oxygen.   HEENT: AT/NC, eyes closed. Dry mm.   Respiratory: Normal work of breathing. Oxymask oxygen  Cardiovascular: RRR  GI/Abd: soft. Non distended.   Extremities: UEs edematous   Neuro: encephalopathic, non verbal.   Skin: wounds not examined. See wocn note.   Psychiatry: unable to assess.      Medical Decision Making       55 MINUTES SPENT BY ME on the date of service doing chart review, history, exam, documentation & further activities per the note.      Data         Imaging results reviewed over the past 24 hrs:   No results found for this or any previous visit (from the past 24 hour(s)).  Recent Labs   Lab 02/18/24  1207 02/18/24  1159 02/18/24  0845 02/18/24  0553 02/18/24  0032 02/17/24  1956 02/17/24  0424 02/17/24  0420   WBC  --   --   --  11.1*  --   --   --  12.8*   HGB  --   --   --  8.4*  --   --   --  6.9*   MCV  --   --   --  98  --   --   --  98   PLT  --   --   --  130*  --   --   --  126*   INR  --   --   --  2.36*  --   --   --  2.21*   NA  --  147*  --  147*  147*  --  148*  148*   < > 152*  152*   POTASSIUM  --   --   --  3.3*  --  3.4  --  3.8   CHLORIDE  --   --   --  121*  --  121*  --  126*   CO2  --   --   --  16*  --  18*  --  17*   BUN  --   --   --  36.1*  --  44.5*  --  62.2*   CR  --   --   --  1.18*  --  1.31*  --  1.60*   ANIONGAP  --   --   --  10  --  9  --  9   KEITH  --   --   --  7.8*  --  7.8*  --  7.9*   GLC 88  --  101* 96   < > 135*   < > 97    < > = values in this interval not displayed.

## 2024-02-23 NOTE — PLAN OF CARE
Problem: Gas Exchange Impaired  Goal: Optimal Gas Exchange  Outcome: Progressing  Intervention: Optimize Oxygenation and Ventilation  Flowsheets (Taken 2/22/2024 1950)  Airway/Ventilation Management: calming measures promoted  Head of Bed (HOB) Positioning: HOB at 20 degrees     Problem: End-of-Life Care  Goal: Comfort, Peace and Preserved Dignity  Outcome: Progressing  Intervention: Promote Physical Comfort  Flowsheets (Taken 2/22/2024 1950)  Airway/Ventilation Support: comfort measures provided  Sensory Stimulation Regulation:   auditory stimulation minimized   lighting decreased   care clustered   quiet environment promoted   visual stimulation minimized  Environmental Support:   calm environment promoted   environmental consistency promoted   personal routine supported   distractions minimized   comfort object encouraged  Intervention: Promote Peace and Maintain Dignity  Flowsheets (Taken 2/22/2024 1950)  Spiritual Activities Assistance:   spiritual support provided   hope instilled  Supportive Measures: active listening utilized  Intervention: Support the Grieving Process  Flowsheets (Taken 2/22/2024 1950)  Family/Support System Care:   involvement promoted   presence promoted   support provided  Life Transition/Adjustment: end-of-life care initiated   Goal Outcome Evaluation:       Pt on comfort cares for end of life. ABELARDO orientation; pt not speaking. 1 L O2 oxymask for comfort. 2 L PIVs; 1 PIV  infusing cont morphine at 3 mg with 10 ml NS fluid carrier. Assist of 2 with reposition -- pt family refused q2h repositioning. Oral cares provided. Family requested spiritual services. No acute events.

## 2024-02-24 NOTE — PROGRESS NOTES
[facility]    Medicine Progress Note - Hospitalist Service, GOLD TEAM 19    Date of Admission:  2/23/2024    Assessment & Plan      Noe Florence is a 88 year old male admitted on 2/23/2024.     Noe Florence is a 88 year old male with PMH notable for CKD3 (baseline Cr ~1.4-1.8), Afib, CAD with past CABG and stents, ischemic cardiomyopathy, polymorphic V-tach s/p ICD, dementia, monoclonal gammopathy of unknown significance, polyneuropathy, L common iliac aneurysm, polymyalgia rheumatica, past colon cancer, recent treatment for L 2nd toe osteomyelitis. Admitted to [facility] ICU for sepsis, Acute encephalopathy, ARSALAN, and profound hypernatremia, severe dysphagia, severe malnutrition, failure to thrive.  Now in hospice/comfort cares. Inpatient hospice team consulted, following.         Today's changes:   Patient in comfort care. Comfort care order set in place.   Currently appears resting comfortably. On morphine drip.   Family at bedside.   No acute concern.  Appreciate Hospice team consult, following.         Severe oropharyngeal dysphagia, profound hypernatremia, ARSALAN with uremia, severe malnutrition, metabolic encephalopathy with baseline dementia:   Failure to thrive with progressive decline over the past few years, accelerated over the past few months.  Patient has been living with and cared for by his significant other Rica, with progressive decline upon returning home from the hospital when he was treated for osteomyelitis of the toe 12/2023.  He has required a Lupillo lift and in-home help twice a day to assist with basic cares.  More recently Rica and patient's son See note that he has had increasing difficulty swallowing, pocketing food and not able to tolerate oral nutritional or liquid intake.  He become progressively weak and bedridden.  Initial labs upon admission to the hospital 2/15/2024 significant for sodium 164, , bicarb 18, creatinine  2.73, with baseline creatinine 1.4-1.8.  , troponin 140 with subsequent downward trend, elevated BNP, WBC 21, hemoglobin 10.3.  Head CT showed old ischemic changes and moderate generalized volume loss with leukoaraiosis.  CXR stable with perihilar and bibasilar streaky/patchy opacities.  1 of 2 blood cultures positive for Staph epidermidis and Enterococcus faecalis.  Repeat blood cultures 2/16/2024 have been negative to date.  History of recently treated left toe osteomyelitis, had been seen by infectious disease 1/31/2024 following an extended course of antibiotics, and antibiotics have been stopped at that time.  The patient was admitted initially to the ICU, treated with hypotonic IV fluids for severe hypernatremia, ARSALAN, supplemental oxygen and empiric IV antibiotics with Zosyn and vancomycin.  Infectious disease was consulted.  Hypernatremia, ARSALAN subsequently improved with hypotonic fluids, with some transient improvement in encephalopathy, but continued severe dysphagia for which he was assessed by ST and unable to manage his own secretions let alone oral intake.  Subsequently developed worsening volume overload with CHF, confirmed by markedly elevated BNP in the 20,000's, CXR 2/18 showing vascular congestion despite sodium being still slightly elevated at 147, necessitating discontinuation of hypotonic IV fluids.    Palliative care has been following in the hospital and met with patient's significant other Rica and patient's son See on 2/16, with goals of care discussions including DNR/DNI and consideration of hospice.     MD Met with patient's significant other Rica, and his son See for an extended family conference on 2/18 to review clinical course and goals of care.  Rica notes that his quality of life has been very poor for the past year, particularly since December following his hospitalization for osteomyelitis.  She has been caring for him at home with the help of aides  "in the morning and in the evening.  He has required a Lupillo lift, had previously been using a Nicci steady at home prior to that hospitalization.  His hospital bed is in the living room.  Rica and See have noticed increased difficulty swallowing over the past few weeks with him pocketing food and taking very little oral intake.  See agrees that his father's quality of life \"is poor\".  See feels that his father's progressive dementia over the past few years has contributed to his decline.  We discussed the competing ongoing issue of worsening CHF despite persistent hypernatremia, he has persistent severe dysphagia despite improvement in his hypernatremia, his baseline poor quality of life and the unlikelihood that we would experience any meaningful recovery with ongoing aggressive medical care.  Rica and See both expressed a desire to transition care focus to comfort and initiate hospice care.  Dw Dr. Winston of palliative care, who recommended general inpatient hospice consultation given the above.Discontinued IV fluids, lab draws, antibiotics, Coumadin, Accu-Cheks     - GIP consulted, comfort cares orders placed.   - Hospice team closely following, appreciate input.   - On morphine drip plus prn morphine iv/SL for pain control now besides other comfort care orders.      CAD, ICM, H/O Vtach s/p ICD, H/O Afib, Acute on chronic systolic CHF:  ECHO 2/15/2024: EF 45-50%, previous EF 55-60% 1/30/2017.    Transitioned to comfort cares with hospice.  -ICD deactivated     CKD, stage III with ARSALAN:  Baseline creatinine 1.4-1.8.       Sepsis with history of osteomyelitis of the left second toe:  Had been hospitalized 12/2023 for osteomyelitis of the left second toe.  Received an extended 7.5 week course of antibiotics.  Had follow-up outpatient with infectious disease 1/31/2024, antibiotics discontinued.  At hospital admission noted to have elevated CRP, leukocytosis in the setting of ARSALAN, " severe hypernatremia and uremia.  1 of 2 blood cultures 2/15 grew Staph epidermidis and Enterococcus faecalis.  Treated with IV Zosyn and vancomycin initially.  Subsequent blood culture 2/16 has been negative to date.  Leukocytosis resolving.  Has ongoing left toe and foot wounds as well as multiple other pressure sores for which wound care nursing is following.  Infectious disease has been following in the hospital.     -Family in agreement with discontinuing antibiotics and further lab draws.        Chronic anemia, thrombocytopenia, MGUS:  Transfused 1 unit for hemoglobin 6.9 on 2/17, subsequent worsening volume overload.  CBC stable 2/18/2024.    -no further lab draws     History of DVT:  Transitioned to hospice, comfort cares.  Discontinued Coumadin, INR draws.     Lymphedema:  Symptomatic management at this point.     Chronic left foot, right lateral malleoli are wounds with history of left second toe osteomyelitis, recently treated with new/more recent onset pressure ulcers:  As above, discontinued antibiotics, lab draws.    - Continue current wound cares, optimize comfort.  Pain control as above.      History of left common iliac artery aneurysm:                 Diet: Advance Diet as Tolerated: Regular Diet Adult    DVT Prophylaxis: Hospice care.   Dominguez Catheter: Not present  Lines: None     Cardiac Monitoring: None  Code Status:  DNR, DNi    Clinically Significant Risk Factors                  # Hypertension: Noted on problem list            # Financial/Environmental Concerns:     # ICD device present       Disposition Plan     Expected Discharge Date: 02/25/2024                    Mckinley Ibarra MD  Hospitalist Service, GOLD TEAM [address] [place] [address]  Securely message with [employer] (more info)  Text page via [employer] Paging/Directory   See signed in provider for up to date coverage  information  ______________________________________________________________________    Interval History   No significant events.  Resting comfortably with eyes closed.  Family at bedside.    Physical Exam   Vital Signs:                    Weight: 0 lbs 0 oz      General Appearance: Resting with eyes closed, nonverbal  Respiratory: Normal work of breathing.  Facemask oxygen.  Cardiovascular: RRR  GI: Soft, nondistended  Extremities: Edematous extremities   skin: dry  Neuro: encephalopathic    Medical Decision Making       35 MINUTES SPENT BY ME on the date of service doing chart review, history, exam, documentation & further activities per the note.      Data         Imaging results reviewed over the past 24 hrs:   No results found for this or any previous visit (from the past 24 hour(s)).  Recent Labs   Lab 02/18/24  1207 02/18/24  1159 02/18/24  0845 02/18/24  0553 02/18/24  0032 02/17/24 1956   WBC  --   --   --  11.1*  --   --    HGB  --   --   --  8.4*  --   --    MCV  --   --   --  98  --   --    PLT  --   --   --  130*  --   --    INR  --   --   --  2.36*  --   --    NA  --  147*  --  147*  147*  --  148*  148*   POTASSIUM  --   --   --  3.3*  --  3.4   CHLORIDE  --   --   --  121*  --  121*   CO2  --   --   --  16*  --  18*   BUN  --   --   --  36.1*  --  44.5*   CR  --   --   --  1.18*  --  1.31*   ANIONGAP  --   --   --  10  --  9   KEITH  --   --   --  7.8*  --  7.8*   GLC 88  --  101* 96   < > 135*    < > = values in this interval not displayed.

## 2024-02-24 NOTE — PLAN OF CARE
Pt slept comfortably thru out the shift. No sign of distress or labored breathing. Son at bedside all night. No acute event during shift. POC continues.

## 2024-02-24 NOTE — PROGRESS NOTES
[facility]    Progress Note - [facility]    ______________________________________________________________________    [facility] Contact Number: [phone]    - Providers: Please contact [facility] with changes in orders or clinical plan of care   - Nursing: Please contact [facility] with significant changes in patient condition  ______________________________________________________________________        Plan of Care Discussed with the Following:   - Nurse: BYRON Boss  - Hospitalist/Rounding Provider: Mckinley Ibarra MD     - Noe's Family/Preferred Contact: Rica Levine (Significant other)     Summary of Visit (includes assessment findings, discharge planning progress/status or any new orders):   Checked in with BYRON Boss no needs, Bill is still resting comfortably.     Assessed Bill in his hospital room. Pedal pulses absent. Left hand is less swollen with further elevation done yesterday, wrinkles seen in hand and arm.   O2 99 on 1L o2 and 97% on Room air, removed O2 mask.   94/42, P60, R6 with 13 seconds of apnea at times.  Resting comfortably. 4 doses of ativan 1mg IV given in last 24 hours for terminal agitation.  Updated [place] on O2 and ok to remove, turned monitor off.     Plan: continue to use PRN ativan for agitation     Goal: maintain pain management as evidenced by no grimace maintained for 48 hours. edema managed as evidenced by no additional sites of weeping while GIP level of care. New goal: Agitation managed as evidenced by resting comfortably while GIP level of care.     Discharge: currently requires GIP level of care due to complex wound care on 7 pressure wounds and 1 skin tear, weeping edema, and morphine drip for pain management. Would need skilled nursing if discharged.       Katelynn Gipson RN

## 2024-02-24 NOTE — PLAN OF CARE
Goal Outcome Evaluation:      Shift: 5034-1336  Pt on comfort cares. Family at bedside. No acute changes. Morphine drip 3mL/hr. Hospice nurse removed oxymask, pt sating adequately.    Call light within reach. Plan of care ongoing      Shift: 6539-5141  Pt on comfort cares. Family at bedside. No acute changes. Morphine drip 3mL/hr. Family requested oxymask to be put back on @1650 for comfort.    Call light within reach. Plan of care ongoing

## 2024-02-25 NOTE — PROGRESS NOTES
[id number]    Plan of Care Reviewed With: patient, family     Overall Patient Progress: no change    Goal Outcome Evaluation: Patient resting comfortably on comfort care, O2 1 LPM oxy mask, sating >90%. No acute changes noted overnight. Family at bedside. Call light is within reach. Will continue to follow POC.    Goal: Maintain pain management with Morphine drip at 3 mL/hr continuous. Managed skin tear of weeping edema, by elevating sites on pillows. Agitation is managed with PRN Ativan.     Problem: End-of-Life Care  Goal: Comfort, Peace and Preserved Dignity  2/25/2024 0332 by Toshia Ford, RN  Outcome: Progressing

## 2024-02-25 NOTE — PROGRESS NOTES
[facility]    Medicine Progress Note - Hospitalist Service, GOLD TEAM 19    Date of Admission:  2/23/2024    Assessment & Plan      Noe Florence is a 88 year old male admitted on 2/23/2024.     Noe Florence is a 88 year old male with PMH notable for CKD3 (baseline Cr ~1.4-1.8), Afib, CAD with past CABG and stents, ischemic cardiomyopathy, polymorphic V-tach s/p ICD, dementia, monoclonal gammopathy of unknown significance, polyneuropathy, L common iliac aneurysm, polymyalgia rheumatica, past colon cancer, recent treatment for L 2nd toe osteomyelitis. Admitted to [facility] ICU for sepsis, Acute encephalopathy, ARSALAN, and profound hypernatremia, severe dysphagia, severe malnutrition, failure to thrive.  Now in hospice/comfort cares. Inpatient hospice team consulted, following.        Today's changes:   Patient in comfort care. Comfort care order set in place.   Currently appears resting comfortably. On morphine drip.   Family at bedside.   No acute concern.  Appreciate Hospice team consult, following.         Severe oropharyngeal dysphagia, profound hypernatremia, ARSALAN with uremia, severe malnutrition, metabolic encephalopathy with baseline dementia:   Failure to thrive with progressive decline over the past few years, accelerated over the past few months.  Patient has been living with and cared for by his significant other Rica, with progressive decline upon returning home from the hospital when he was treated for osteomyelitis of the toe 12/2023.  He has required a Lupillo lift and in-home help twice a day to assist with basic cares.  More recently Rica and patient's son See note that he has had increasing difficulty swallowing, pocketing food and not able to tolerate oral nutritional or liquid intake.  He become progressively weak and bedridden.  Initial labs upon admission to the hospital 2/15/2024 significant for sodium 164, , bicarb 18, creatinine  2.73, with baseline creatinine 1.4-1.8.  , troponin 140 with subsequent downward trend, elevated BNP, WBC 21, hemoglobin 10.3.  Head CT showed old ischemic changes and moderate generalized volume loss with leukoaraiosis.  CXR stable with perihilar and bibasilar streaky/patchy opacities.  1 of 2 blood cultures positive for Staph epidermidis and Enterococcus faecalis.  Repeat blood cultures 2/16/2024 have been negative to date.  History of recently treated left toe osteomyelitis, had been seen by infectious disease 1/31/2024 following an extended course of antibiotics, and antibiotics have been stopped at that time.  The patient was admitted initially to the ICU, treated with hypotonic IV fluids for severe hypernatremia, ARSALAN, supplemental oxygen and empiric IV antibiotics with Zosyn and vancomycin.  Infectious disease was consulted.  Hypernatremia, ARSALAN subsequently improved with hypotonic fluids, with some transient improvement in encephalopathy, but continued severe dysphagia for which he was assessed by ST and unable to manage his own secretions let alone oral intake.  Subsequently developed worsening volume overload with CHF, confirmed by markedly elevated BNP in the 20,000's, CXR 2/18 showing vascular congestion despite sodium being still slightly elevated at 147, necessitating discontinuation of hypotonic IV fluids.    Palliative care has been following in the hospital and met with patient's significant other Rica and patient's son See on 2/16, with goals of care discussions including DNR/DNI and consideration of hospice.     MD Met with patient's significant other Rica, and his son See for an extended family conference on 2/18 to review clinical course and goals of care.  Rica notes that his quality of life has been very poor for the past year, particularly since December following his hospitalization for osteomyelitis.  She has been caring for him at home with the help of aides  "in the morning and in the evening.  He has required a Lupillo lift, had previously been using a Nicci steady at home prior to that hospitalization.  His hospital bed is in the living room.  Rica and See have noticed increased difficulty swallowing over the past few weeks with him pocketing food and taking very little oral intake.  See agrees that his father's quality of life \"is poor\".  See feels that his father's progressive dementia over the past few years has contributed to his decline.  We discussed the competing ongoing issue of worsening CHF despite persistent hypernatremia, he has persistent severe dysphagia despite improvement in his hypernatremia, his baseline poor quality of life and the unlikelihood that we would experience any meaningful recovery with ongoing aggressive medical care.  Rica and See both expressed a desire to transition care focus to comfort and initiate hospice care.  Dw Dr. Winston of palliative care, who recommended general inpatient hospice consultation given the above.Discontinued IV fluids, lab draws, antibiotics, Coumadin, Accu-Cheks     - GIP consulted, comfort cares orders placed.   - Hospice team closely following, appreciate input.   - On morphine drip plus prn morphine iv/SL for pain control now besides other comfort care orders.      CAD, ICM, H/O Vtach s/p ICD, H/O Afib, Acute on chronic systolic CHF:  ECHO 2/15/2024: EF 45-50%, previous EF 55-60% 1/30/2017.    Transitioned to comfort cares with hospice.  -ICD deactivated     CKD, stage III with ARSALAN:  Baseline creatinine 1.4-1.8.       Sepsis with history of osteomyelitis of the left second toe:  Had been hospitalized 12/2023 for osteomyelitis of the left second toe.  Received an extended 7.5 week course of antibiotics.  Had follow-up outpatient with infectious disease 1/31/2024, antibiotics discontinued.  At hospital admission noted to have elevated CRP, leukocytosis in the setting of ARSALAN, " severe hypernatremia and uremia.  1 of 2 blood cultures 2/15 grew Staph epidermidis and Enterococcus faecalis.  Treated with IV Zosyn and vancomycin initially.  Subsequent blood culture 2/16 has been negative to date.  Leukocytosis resolving.  Has ongoing left toe and foot wounds as well as multiple other pressure sores for which wound care nursing is following.  Infectious disease has been following in the hospital.     -Family in agreement with discontinuing antibiotics and further lab draws.        Chronic anemia, thrombocytopenia, MGUS:  Transfused 1 unit for hemoglobin 6.9 on 2/17, subsequent worsening volume overload.  CBC stable 2/18/2024.    -no further lab draws     History of DVT:  Transitioned to hospice, comfort cares.  Discontinued Coumadin, INR draws.     Lymphedema:  Symptomatic management at this point.     Chronic left foot, right lateral malleoli are wounds with history of left second toe osteomyelitis, recently treated with new/more recent onset pressure ulcers:  As above, discontinued antibiotics, lab draws.    - Continue current wound cares, optimize comfort.  Pain control as above.      History of left common iliac artery aneurysm:                 Diet: Advance Diet as Tolerated: Regular Diet Adult    DVT Prophylaxis: Hospice care.   Dominguez Catheter: Not present  Lines: None     Cardiac Monitoring: None  Code Status: No CPR- Do NOT IntubateDNR, DNi    Clinically Significant Risk Factors                  # Hypertension: Noted on problem list            # Financial/Environmental Concerns:     # ICD device present       Disposition Plan     Expected Discharge Date: 02/25/2024                    Mckinley Ibarra MD  Hospitalist Service, GOLD TEAM [address] [place] [address]  Securely message with [employer] (more info)  Text page via [facility] Paging/Directory   See signed in provider for up to date coverage  information  ______________________________________________________________________    Interval History   No significant events.  Resting comfortably with eyes closed.  Family at bedside.    Physical Exam   Vital Signs:                    Weight: 0 lbs 0 oz      General Appearance: Resting with eyes closed, nonverbal  Respiratory: Normal work of breathing.  Facemask oxygen.  Cardiovascular: RRR  GI: Soft, nondistended  Extremities: Edematous extremities   skin: dry  Neuro: encephalopathic    Medical Decision Making       30 MINUTES SPENT BY ME on the date of service doing chart review, history, exam, documentation & further activities per the note.      Data         Imaging results reviewed over the past 24 hrs:   No results found for this or any previous visit (from the past 24 hour(s)).  No lab results found in last 7 days.

## 2024-02-26 NOTE — CONSULTS
[facility]    Consult Note - [facility]  _________________________________________________________________    [facility] Contact Number: [phone]    - Providers: Please contact [facility] with changes in orders or clinical plan of care   - Nursing: Please contact [facility] with significant changes in patient condition    Hospice will notify the care team (including the hospitalist) to confirm date of inpatient hospice (GIP) admission.    New Epic encounter will not be created until hospice completes admission.   ______________________________________________________________________        Hospice Diagnosis: sepsis    Indication for Inpatient Hospice: pain, agitation    Goals for Hospital Discharge: <3 PRN's in 24 hours for 48 hours, hemodynamically stable for discharge    Plan of Care Discussed with the Following:   - Nurse: BYRON Chavez  - Hospitalist/Rounding Provider:  Dr. Ibarra  - Noe's Family/Preferred Contact: spouse, Rica  - Hospice Provider: Dr. Landrum    Summary of Visit (includes assessment, medications and any new orders):   Patient appears peaceful in bed. Educated family that monitoring O2 is not necessary. Family declined to remove monitoring. Also, educated them that they do not have to reposition, but that they can request pre-medications prior to changing to reduce pain while still maintaining dignity. Asked family if they would like to have ativan scheduled, but they declined.     No new order recommendations at this time.       Charlotte Chavez

## 2024-02-26 NOTE — PROGRESS NOTES
[id number]    PT is on comfort cares. Family at bedside Morphine drip infusing.pt has oxymask on. PRN med ativan given   Continue with POC

## 2024-02-26 NOTE — PROGRESS NOTES
[facility]    Medicine Progress Note - Hospitalist Service, GOLD TEAM 19    Date of Admission:  2/23/2024    Assessment & Plan      Noe Florence is a 88 year old male admitted on 2/23/2024.     Noe Florence is a 88 year old male with PMH notable for CKD3 (baseline Cr ~1.4-1.8), Afib, CAD with past CABG and stents, ischemic cardiomyopathy, polymorphic V-tach s/p ICD, dementia, monoclonal gammopathy of unknown significance, polyneuropathy, L common iliac aneurysm, polymyalgia rheumatica, past colon cancer, recent treatment for L 2nd toe osteomyelitis. Admitted to [facility] ICU for sepsis, Acute encephalopathy, ARSALAN, and profound hypernatremia, severe dysphagia, severe malnutrition, failure to thrive.  Now in hospice/comfort cares. Inpatient hospice team consulted, following.        Today's changes:   Patient in comfort care. Comfort care order set in place.   Currently appears resting comfortably. On morphine drip.   Family at bedside.   No acute concern.  Appreciate Hospice team consult, following.         Severe oropharyngeal dysphagia, profound hypernatremia, ARSALAN with uremia, severe malnutrition, metabolic encephalopathy with baseline dementia:   Failure to thrive with progressive decline over the past few years, accelerated over the past few months.  Patient has been living with and cared for by his significant other Rica, with progressive decline upon returning home from the hospital when he was treated for osteomyelitis of the toe 12/2023.  He has required a [place] lift and in-home help twice a day to assist with basic cares.  More recently Rica and patient's son See note that he has had increasing difficulty swallowing, pocketing food and not able to tolerate oral nutritional or liquid intake.  He become progressively weak and bedridden.  Initial labs upon admission to the hospital 2/15/2024 significant for sodium 164, , bicarb 18, creatinine  2.73, with baseline creatinine 1.4-1.8.  , troponin 140 with subsequent downward trend, elevated BNP, WBC 21, hemoglobin 10.3.  Head CT showed old ischemic changes and moderate generalized volume loss with leukoaraiosis.  CXR stable with perihilar and bibasilar streaky/patchy opacities.  1 of 2 blood cultures positive for Staph epidermidis and Enterococcus faecalis.  Repeat blood cultures 2/16/2024 have been negative to date.  History of recently treated left toe osteomyelitis, had been seen by infectious disease 1/31/2024 following an extended course of antibiotics, and antibiotics have been stopped at that time.  The patient was admitted initially to the ICU, treated with hypotonic IV fluids for severe hypernatremia, ARSALAN, supplemental oxygen and empiric IV antibiotics with Zosyn and vancomycin.  Infectious disease was consulted.  Hypernatremia, ARSALAN subsequently improved with hypotonic fluids, with some transient improvement in encephalopathy, but continued severe dysphagia for which he was assessed by ST and unable to manage his own secretions let alone oral intake.  Subsequently developed worsening volume overload with CHF, confirmed by markedly elevated BNP in the 20,000's, CXR 2/18 showing vascular congestion despite sodium being still slightly elevated at 147, necessitating discontinuation of hypotonic IV fluids.    Palliative care has been following in the hospital and met with patient's significant other Rica and patient's son See on 2/16, with goals of care discussions including DNR/DNI and consideration of hospice.     MD Met with patient's significant other Rica, and his son See for an extended family conference on 2/18 to review clinical course and goals of care.  Rica notes that his quality of life has been very poor for the past year, particularly since December following his hospitalization for osteomyelitis.  She has been caring for him at home with the help of aides  "in the morning and in the evening.  He has required a Lupillo lift, had previously been using a [place]ci steady at home prior to that hospitalization.  His hospital bed is in the living room.  Rica and See have noticed increased difficulty swallowing over the past few weeks with him pocketing food and taking very little oral intake.  See agrees that his father's quality of life \"is poor\".  See feels that his father's progressive dementia over the past few years has contributed to his decline.  We discussed the competing ongoing issue of worsening CHF despite persistent hypernatremia, he has persistent severe dysphagia despite improvement in his hypernatremia, his baseline poor quality of life and the unlikelihood that we would experience any meaningful recovery with ongoing aggressive medical care.  Rica and See both expressed a desire to transition care focus to comfort and initiate hospice care.  Dw Dr. Winston of palliative care, who recommended general inpatient hospice consultation given the above.Discontinued IV fluids, lab draws, antibiotics, Coumadin, Accu-Cheks     - GIP consulted, comfort cares orders placed.   - Hospice team closely following, appreciate input.   - On morphine drip plus prn morphine iv/SL for pain control now besides other comfort care orders.      CAD, ICM, H/O Vtach s/p ICD, H/O Afib, Acute on chronic systolic CHF:  ECHO 2/15/2024: EF 45-50%, previous EF 55-60% 1/30/2017.    Transitioned to comfort cares with hospice.  -ICD deactivated     CKD, stage III with ARSALAN:  Baseline creatinine 1.4-1.8.       Sepsis with history of osteomyelitis of the left second toe:  Had been hospitalized 12/2023 for osteomyelitis of the left second toe.  Received an extended 7.5 week course of antibiotics.  Had follow-up outpatient with infectious disease 1/31/2024, antibiotics discontinued.  At hospital admission noted to have elevated CRP, leukocytosis in the setting of ARSALAN, " severe hypernatremia and uremia.  1 of 2 blood cultures 2/15 grew Staph epidermidis and Enterococcus faecalis.  Treated with IV Zosyn and vancomycin initially.  Subsequent blood culture 2/16 has been negative to date.  Leukocytosis resolving.  Has ongoing left toe and foot wounds as well as multiple other pressure sores for which wound care nursing is following.  Infectious disease has been following in the hospital.     -Family in agreement with discontinuing antibiotics and further lab draws.        Chronic anemia, thrombocytopenia, MGUS:  Transfused 1 unit for hemoglobin 6.9 on 2/17, subsequent worsening volume overload.  CBC stable 2/18/2024.    -no further lab draws     History of DVT:  Transitioned to hospice, comfort cares.  Discontinued Coumadin, INR draws.     Lymphedema:  Symptomatic management at this point.     Chronic left foot, right lateral malleoli are wounds with history of left second toe osteomyelitis, recently treated with new/more recent onset pressure ulcers:  As above, discontinued antibiotics, lab draws.    - Continue current wound cares, optimize comfort.  Pain control as above.      History of left common iliac artery aneurysm:                 Diet: Advance Diet as Tolerated: Regular Diet Adult    DVT Prophylaxis: Hospice care.   Dominguez Catheter: Not present  Lines: None     Cardiac Monitoring: None  Code Status: No CPR- Do NOT IntubateDNR, DNi    Clinically Significant Risk Factors                  # Hypertension: Noted on problem list            # Financial/Environmental Concerns:     # ICD device present       Disposition Plan         TBD    Mckinley Ibarra MD  Hospitalist Service, GOLD TEAM [address] [place] [address]  Securely message with [employer] (more info)  Text page via [employer] Paging/Directory   See signed in provider for up to date coverage information  ______________________________________________________________________    Interval History   No  significant events.  Resting comfortably with eyes closed.  Family at bedside.    Physical Exam   Vital Signs:                    Weight: 0 lbs 0 oz      General Appearance: Resting with eyes closed, nonverbal  Respiratory: Normal work of breathing.  Facemask oxygen.  Cardiovascular: RRR  GI: Soft, nondistended  Extremities: Edematous extremities   skin: dry  Neuro: encephalopathic    Medical Decision Making       35 MINUTES SPENT BY ME on the date of service doing chart review, history, exam, documentation & further activities per the note.      Data         Imaging results reviewed over the past 24 hrs:   No results found for this or any previous visit (from the past 24 hour(s)).  No lab results found in last 7 days.

## 2024-02-26 NOTE — PLAN OF CARE
Problem: Adult Inpatient Plan of Care  Goal: Plan of Care Review  Description: The Plan of Care Review/Shift note should be completed every shift.  The Outcome Evaluation is a brief statement about your assessment that the patient is improving, declining, or no change.  This information will be displayed automatically on your shift  note.  Outcome: Progressing  Flowsheets (Taken 2/26/2024 1355)  Outcome Evaluation: Pt is unresponsive, repositioning refused this shift education provider on the risks if pt stays in the same position family stated they understood and dont want him bothered. Pt on 0.5 L oxymask for comfort per family and in continuous pulse ox per family for comfort. Pt is on continuous morphine drip with PRN ativan Q3 hours for anxiety.  Plan of Care Reviewed With: family  Overall Patient Progress: no change     Problem: Adult Inpatient Plan of Care  Goal: Absence of Hospital-Acquired Illness or Injury  Intervention: Prevent Skin Injury  Recent Flowsheet Documentation  Taken 2/26/2024 1345 by Kathy Reyes RN  Device Skin Pressure Protection: absorbent pad utilized/changed  Taken 2/26/2024 0840 by Kathy Reyes RN  Body Position:   right   position maintained     Problem: Adult Inpatient Plan of Care  Goal: Optimal Comfort and Wellbeing  Intervention: Monitor Pain and Promote Comfort  Recent Flowsheet Documentation  Taken 2/26/2024 1347 by Kathy Reyes RN  Pain Management Interventions: medication (see MAR)     Problem: Adult Inpatient Plan of Care  Goal: Optimal Comfort and Wellbeing  Intervention: Provide Person-Centered Care  Recent Flowsheet Documentation  Taken 2/26/2024 1347 by Kathy Reyes RN  Trust Relationship/Rapport:   care explained  Problem: End-of-Life Care  Goal: Comfort, Peace and Preserved Dignity  Intervention: Promote Physical Comfort  Recent Flowsheet Documentation  Taken 2/26/2024 1347 by Kathy Reyes, RN  Airway/Ventilation Support: comfort measures provided  Sensory  "Stimulation Regulation:   auditory stimulation minimized   lighting decreased   quiet environment promoted  Environmental Support:   calm environment promoted   comfort object encouraged     Problem: End-of-Life Care  Goal: Comfort, Peace and Preserved Dignity  Outcome: Progressing  Intervention: Promote Physical Comfort  Recent Flowsheet Documentation  Taken 2/26/2024 1347 by Kathy Reyes RN  Airway/Ventilation Support: comfort measures provided  Sensory Stimulation Regulation:   auditory stimulation minimized   lighting decreased   quiet environment promoted  Environmental Support:   calm environment promoted   comfort object encouraged  Intervention: Promote Peace and Maintain Dignity  Recent Flowsheet Documentation  Taken 2/26/2024 1347 by Kathy Reyes RN  Supportive Measures:   active listening utilized   counseling provided   decision-making supported  Intervention: Support the Grieving Process  Recent Flowsheet Documentation  Taken 2/26/2024 1347 by Kathy Reyes RN  Family/Support System Care:   caregiver stress acknowledged   presence promoted  Life Transition/Adjustment: end-of-life care initiated      choices provided   emotional support provided   empathic listening provided   questions answered   questions encouraged   Goal Outcome Evaluation:      Plan of Care Reviewed With: family    Overall Patient Progress: no changeOverall Patient Progress: no change    Outcome Evaluation: Pt is unresponsive, repositioning refused this shift education provided on the risks if pt stays in the same position family stated they understood and dont want him bothered. Pt on 0.5 L oxymask for comfort per family and in continuous pulse ox per family for comfort. Pt is on continuous morphine drip with PRN ativan Q3 hours for anxiety. Hospice nurse visited this shift  informed family refusing repositioning and checking if patient needs to be changed again education provided family still refused stating \" I just want " "him to feel comfortable\" continue POC        "

## 2024-02-26 NOTE — CONSULTS
[facility]    Consult Note - [facility] Inpatient Hospice  _________________________________________________________________    [facility] Contact Number: [phone]    - Providers: Please contact [facility] with changes in orders or clinical plan of care   - Nursing: Please contact [facility] with significant changes in patient condition    Hospice will notify the care team (including the hospitalist) to confirm date of inpatient hospice (GIP) admission.    New Epic encounter will not be created until hospice completes admission.   ______________________________________________________________________        Hospice Diagnosis: Sepsis    Indication for Inpatient Hospice: Pain and Agitation    Goals for Hospital Discharge: Pain and agitation managed AEB resting comfortably with 3 or < PRNS in a 24 hour period maintained for 48 hours.     Plan of Care Discussed with the Following:   - Nurse: Maira  - Hospitalist/Rounding Provider: Did not contact.  No new recommendations for orders.    - Noe's Family/Preferred Contact: LAVONNE Strauss  - Hospice Provider: Dr. Katarzyna Landrum    Summary of Visit (includes assessment, medications and any new orders):     Patient not responsive during assessment. Rica, a close friend, and son Sang present in room.     VS-   BP: 104/48  T: 97.4, temporal  P: 60  R: 12  02 sats: 99% on 0.5 Liters with 02 mask loosely in place.   Patches of pink discoloration to bilateral knees, possibly mottling, as caregivers report this to be a new finding.     Active listening provided.  'Gone from my sight' booklet provided.     Sang and Rica recounted how difficult it was for patient when he was moved from a different hospital room.  Patient appeared very agitated, AEB patient tightly gripping bedrails and staring at ceiling with eyes wide open, in comparison to his previous baseline of resting with eyes closed with the rest of his  body appearing relaxed.    Family is very appreciative of the attentive care being provided to Bill!       Patito Stokes RN

## 2024-02-26 NOTE — PLAN OF CARE
Goal Outcome Evaluation:    [id number]    Please see flowsheets and MAR for pt full assessment    Pt on comfort cares resting comfortably no signs of distress or labored breathing. Family/Son at bedside. Morphine drip 3mL/hr. Pt on 1 lpm via Oxymask O2 is >96%. PRN ativan given x4 q3hrs per family request for agitation. No acute changes on NOC shift. Family has requested that the patient is to be disturbed as little as possible or not at all due to pain and end of life no repositioning and no vitals.  Call light within reach. Plan of care ongoing [facility]    Plan of Care Reviewed With: family    Overall Patient Progress: no change    Omero Leavitt RN

## 2024-02-27 NOTE — PLAN OF CARE
Goal Outcome Evaluation:    Care plan reviewed with: patient    Patient is unresponsive, on hospice care, family present at bedside. Request for ativan IV q3 hrs, and PCA pump running. Hospice RN visit this shift. No changes.      Problem: Adult Inpatient Plan of Care  Goal: Plan of Care Review  Description: The Plan of Care Review/Shift note should be completed every shift.  The Outcome Evaluation is a brief statement about your assessment that the patient is improving, declining, or no change.  This information will be displayed automatically on your shift  note.  Outcome: Progressing     Problem: End-of-Life Care  Goal: Comfort, Peace and Preserved Dignity  Outcome: Progressing

## 2024-02-27 NOTE — PROGRESS NOTES
[facility]    Medicine Progress Note - Hospitalist Service, GOLD TEAM 19    Date of Admission:  2/23/2024    Assessment & Plan      Noe Florence is a 88 year old male admitted on 2/23/2024.     Noe Florence is a 88 year old male with PMH notable for CKD3 (baseline Cr ~1.4-1.8), Afib, CAD with past CABG and stents, ischemic cardiomyopathy, polymorphic V-tach s/p ICD, dementia, monoclonal gammopathy of unknown significance, polyneuropathy, L common iliac aneurysm, polymyalgia rheumatica, past colon cancer, recent treatment for L 2nd toe osteomyelitis. Admitted to [facility] ICU for sepsis, Acute encephalopathy, ARSALAN, and profound hypernatremia, severe dysphagia, severe malnutrition, failure to thrive.  Now in hospice/comfort cares. Inpatient hospice team consulted, following.        Today's changes:     No acute events overnight. Patient looked comfortable. Pt's family at bedside.   Patient is on comfort care.          Severe oropharyngeal dysphagia, profound hypernatremia, ARSALAN with uremia, severe malnutrition, metabolic encephalopathy with baseline dementia:   Failure to thrive with progressive decline over the past few years, accelerated over the past few months.  Patient has been living with and cared for by his significant other Rica, with progressive decline upon returning home from the hospital when he was treated for osteomyelitis of the toe 12/2023.  He has required a Lupillo lift and in-home help twice a day to assist with basic cares.  More recently Rica and patient's son See note that he has had increasing difficulty swallowing, pocketing food and not able to tolerate oral nutritional or liquid intake.  He become progressively weak and bedridden.  Initial labs upon admission to the hospital 2/15/2024 significant for sodium 164, , bicarb 18, creatinine 2.73, with baseline creatinine 1.4-1.8.  , troponin 140 with subsequent downward  trend, elevated BNP, WBC 21, hemoglobin 10.3.  Head CT showed old ischemic changes and moderate generalized volume loss with leukoaraiosis.  CXR stable with perihilar and bibasilar streaky/patchy opacities.  1 of 2 blood cultures positive for Staph epidermidis and Enterococcus faecalis.  Repeat blood cultures 2/16/2024 have been negative to date.  History of recently treated left toe osteomyelitis, had been seen by infectious disease 1/31/2024 following an extended course of antibiotics, and antibiotics have been stopped at that time.  The patient was admitted initially to the ICU, treated with hypotonic IV fluids for severe hypernatremia, ARSALAN, supplemental oxygen and empiric IV antibiotics with Zosyn and vancomycin.  Infectious disease was consulted.  Hypernatremia, ARSALAN subsequently improved with hypotonic fluids, with some transient improvement in encephalopathy, but continued severe dysphagia for which he was assessed by ST and unable to manage his own secretions let alone oral intake.  Subsequently developed worsening volume overload with CHF, confirmed by markedly elevated BNP in the 20,000's, CXR 2/18 showing vascular congestion despite sodium being still slightly elevated at 147, necessitating discontinuation of hypotonic IV fluids.    Palliative care has been following in the hospital and met with patient's significant other Rica and patient's son See on 2/16, with goals of care discussions including DNR/DNI and consideration of hospice.     MD Met with patient's significant other Rica, and his son See for an extended family conference on 2/18 to review clinical course and goals of care.  Rica notes that his quality of life has been very poor for the past year, particularly since December following his hospitalization for osteomyelitis.  She has been caring for him at home with the help of aides in the morning and in the evening.  He has required a Lupillo lift, had previously been  "using a Nicci steady at home prior to that hospitalization.  His hospital bed is in the living room.  Rica and See have noticed increased difficulty swallowing over the past few weeks with him pocketing food and taking very little oral intake.  See agrees that his father's quality of life \"is poor\".  See feels that his father's progressive dementia over the past few years has contributed to his decline.  We discussed the competing ongoing issue of worsening CHF despite persistent hypernatremia, he has persistent severe dysphagia despite improvement in his hypernatremia, his baseline poor quality of life and the unlikelihood that we would experience any meaningful recovery with ongoing aggressive medical care.  Rica and See both expressed a desire to transition care focus to comfort and initiate hospice care.  Dw Dr. Winston of palliative care, who recommended general inpatient hospice consultation given the above.Discontinued IV fluids, lab draws, antibiotics, Coumadin, Accu-Cheks     - GIP consulted, comfort cares orders placed.   - Hospice team closely following, appreciate input.   - On morphine drip plus prn morphine iv/SL for pain control now besides other comfort care orders.      CAD, ICM, H/O Vtach s/p ICD, H/O Afib, Acute on chronic systolic CHF:  ECHO 2/15/2024: EF 45-50%, previous EF 55-60% 1/30/2017.    Transitioned to comfort cares with hospice.  -ICD deactivated     CKD, stage III with ARSALAN:  Baseline creatinine 1.4-1.8.       Sepsis with history of osteomyelitis of the left second toe:  Had been hospitalized 12/2023 for osteomyelitis of the left second toe.  Received an extended 7.5 week course of antibiotics.  Had follow-up outpatient with infectious disease 1/31/2024, antibiotics discontinued.  At hospital admission noted to have elevated CRP, leukocytosis in the setting of ARSALAN, severe hypernatremia and uremia.  1 of 2 blood cultures 2/15 grew Staph epidermidis and " Enterococcus faecalis.  Treated with IV Zosyn and vancomycin initially.  Subsequent blood culture 2/16 has been negative to date.  Leukocytosis resolving.  Has ongoing left toe and foot wounds as well as multiple other pressure sores for which wound care nursing is following.  Infectious disease has been following in the hospital.     -Family in agreement with discontinuing antibiotics and further lab draws.        Chronic anemia, thrombocytopenia, MGUS:  Transfused 1 unit for hemoglobin 6.9 on 2/17, subsequent worsening volume overload.  CBC stable 2/18/2024.    -no further lab draws     History of DVT:  Transitioned to hospice, comfort cares.  Discontinued Coumadin, INR draws.     Lymphedema:  Symptomatic management at this point.     Chronic left foot, right lateral malleoli are wounds with history of left second toe osteomyelitis, recently treated with new/more recent onset pressure ulcers:  As above, discontinued antibiotics, lab draws.    - Continue current wound cares, optimize comfort.  Pain control as above.      History of left common iliac artery aneurysm:                 Diet: Advance Diet as Tolerated: Regular Diet Adult    DVT Prophylaxis: Hospice care.   Dominguez Catheter: Not present  Lines: None     Cardiac Monitoring: None  Code Status: No CPR- Do NOT IntubateDNR, DNi    Clinically Significant Risk Factors                  # Hypertension: Noted on problem list            # Financial/Environmental Concerns:     # ICD device present       Disposition Plan         TBD    Harsh Pineda MD  Hospitalist Service, GOLD TEAM [address] [place] [address]  Securely message with [employer] (more info)  Text page via [facility] Paging/Directory   See signed in provider for up to date coverage information  ______________________________________________________________________    Interval History   No significant events.  Resting comfortably with eyes closed.  Family at  bedside.    Physical Exam   Vital Signs:       Pulse: 60   Resp: 12 SpO2: 99 % O2 Device: Oxymask Oxygen Delivery: 1 LPM  Weight: 0 lbs 0 oz      General Appearance: Resting with eyes closed, nonverbal  Respiratory: Normal work of breathing.   Cardiovascular: RRR  GI: Soft, nondistended  Extremities: Edematous extremities   Neuro: Somnolent     Medical Decision Making       35 MINUTES SPENT BY ME on the date of service doing chart review, history, exam, documentation & further activities per the note.      Data         Imaging results reviewed over the past 24 hrs:   No results found for this or any previous visit (from the past 24 hour(s)).  No lab results found in last 7 days.

## 2024-02-27 NOTE — PROGRESS NOTES
[id number]    Plan of Care Reviewed With: patient, family     Overall Patient Progress: no change     Goal Outcome Evaluation: Patient resting comfortably. Comfort care in place.  O2 1 LPM oxy mask, sating >90%. No acute changes noted overnight. Family at bedside. Call light is within reach. Will continue to follow POC.     Goal: Maintain pain management with Morphine drip at 3 mL/hr continuous. Managed skin tear of weeping edema, by elevating sites on pillows. Agitation is managed with PRN Ativan Q3hrs. Hospice team is following.

## 2024-02-27 NOTE — CONSULTS
[facility]    Consult Note - [facility] Inpatient Hospice  _________________________________________________________________    [facility] Contact Number: [phone]    - Providers: Please contact [facility] with changes in orders or clinical plan of care   - Nursing: Please contact [facility] with significant changes in patient condition    Hospice will notify the care team (including the hospitalist) to confirm date of inpatient hospice (GIP) admission.    New Epic encounter will not be created until hospice completes admission.   ______________________________________________________________________        Hospice Diagnosis: sepsis    Indication for Inpatient Hospice: pain, agitation    Goals for Hospital Discharge: <3 PRN's in 24 hours for 48 hours, hemodynamically stable for discharge    Plan of Care Discussed with the Following:   - Nurse: BYRON Espana  - Hospitalist/Rounding Provider:  Dr. Pineda  - Noe's Family/Preferred Contact: spouse, Rica  - Hospice Provider: Dr. Landrum    Summary of Visit (includes assessment, medications and any new orders):   Met with patient and family. No new changes, RR 6. Discussed scheduling lorazepam again, which family declined. Educated on normal dying process and all questions answered to family's satisfaction.     No new order recommendations at this time.       Charlotte Chavez

## 2024-02-28 NOTE — PROGRESS NOTES
[facility]    Medicine Progress Note - Hospitalist Service, GOLD TEAM 19    Date of Admission:  2/23/2024    Assessment & Plan      Noe Florence is a 88 year old male admitted on 2/23/2024.     Noe Florence is a 88 year old male with PMH notable for CKD3 (baseline Cr ~1.4-1.8), Afib, CAD with past CABG and stents, ischemic cardiomyopathy, polymorphic V-tach s/p ICD, dementia, monoclonal gammopathy of unknown significance, polyneuropathy, L common iliac aneurysm, polymyalgia rheumatica, past colon cancer, recent treatment for L 2nd toe osteomyelitis. Admitted to [facility] ICU for sepsis, Acute encephalopathy, ARSALAN, and profound hypernatremia, severe dysphagia, severe malnutrition, failure to thrive.  Now in hospice/comfort cares. Inpatient hospice team consulted, following.        Today's changes:     No acute events overnight.  Patient looked comfortable.  Patient's family noted he is more shallow breathing today.  Patient was very somnolent.  Hospice team following the patient.        Severe oropharyngeal dysphagia, profound hypernatremia, ARSALAN with uremia, severe malnutrition, metabolic encephalopathy with baseline dementia:   Failure to thrive with progressive decline over the past few years, accelerated over the past few months.  Patient has been living with and cared for by his significant other Rica, with progressive decline upon returning home from the hospital when he was treated for osteomyelitis of the toe 12/2023.  He has required a Lupillo lift and in-home help twice a day to assist with basic cares.  More recently Rica and patient's son See note that he has had increasing difficulty swallowing, pocketing food and not able to tolerate oral nutritional or liquid intake.  He become progressively weak and bedridden.  Initial labs upon admission to the hospital 2/15/2024 significant for sodium 164, , bicarb 18, creatinine 2.73, with baseline  creatinine 1.4-1.8.  , troponin 140 with subsequent downward trend, elevated BNP, WBC 21, hemoglobin 10.3.  Head CT showed old ischemic changes and moderate generalized volume loss with leukoaraiosis.  CXR stable with perihilar and bibasilar streaky/patchy opacities.  1 of 2 blood cultures positive for Staph epidermidis and Enterococcus faecalis.  Repeat blood cultures 2/16/2024 have been negative to date.  History of recently treated left toe osteomyelitis, had been seen by infectious disease 1/31/2024 following an extended course of antibiotics, and antibiotics have been stopped at that time.  The patient was admitted initially to the ICU, treated with hypotonic IV fluids for severe hypernatremia, ARSALAN, supplemental oxygen and empiric IV antibiotics with Zosyn and vancomycin.  Infectious disease was consulted.  Hypernatremia, ARSALAN subsequently improved with hypotonic fluids, with some transient improvement in encephalopathy, but continued severe dysphagia for which he was assessed by ST and unable to manage his own secretions let alone oral intake.  Subsequently developed worsening volume overload with CHF, confirmed by markedly elevated BNP in the 20,000's, CXR 2/18 showing vascular congestion despite sodium being still slightly elevated at 147, necessitating discontinuation of hypotonic IV fluids.    Palliative care has been following in the hospital and met with patient's significant other Rica and patient's son See on 2/16, with goals of care discussions including DNR/DNI and consideration of hospice.     MD Met with patient's significant other Rica, and his son See for an extended family conference on 2/18 to review clinical course and goals of care.  Rica notes that his quality of life has been very poor for the past year, particularly since December following his hospitalization for osteomyelitis.  She has been caring for him at home with the help of aides in the morning and in  "the evening.  He has required a Lupillo lift, had previously been using a Nicci steady at home prior to that hospitalization.  His hospital bed is in the living room.  Rica and See have noticed increased difficulty swallowing over the past few weeks with him pocketing food and taking very little oral intake.  See agrees that his father's quality of life \"is poor\".  See feels that his father's progressive dementia over the past few years has contributed to his decline.  We discussed the competing ongoing issue of worsening CHF despite persistent hypernatremia, he has persistent severe dysphagia despite improvement in his hypernatremia, his baseline poor quality of life and the unlikelihood that we would experience any meaningful recovery with ongoing aggressive medical care.  Rica and See both expressed a desire to transition care focus to comfort and initiate hospice care.  Dw Dr. Winston of palliative care, who recommended general inpatient hospice consultation given the above.Discontinued IV fluids, lab draws, antibiotics, Coumadin, Accu-Cheks     - GIP consulted, comfort cares orders placed.   - Hospice team closely following, appreciate input.   - On morphine drip plus prn morphine iv/SL for pain control now besides other comfort care orders.      CAD, ICM, H/O Vtach s/p ICD, H/O Afib, Acute on chronic systolic CHF:  ECHO 2/15/2024: EF 45-50%, previous EF 55-60% 1/30/2017.    Transitioned to comfort cares with hospice.  -ICD deactivated     CKD, stage III with ARSALAN:  Baseline creatinine 1.4-1.8.       Sepsis with history of osteomyelitis of the left second toe:  Had been hospitalized 12/2023 for osteomyelitis of the left second toe.  Received an extended 7.5 week course of antibiotics.  Had follow-up outpatient with infectious disease 1/31/2024, antibiotics discontinued.  At hospital admission noted to have elevated CRP, leukocytosis in the setting of ARSALAN, severe hypernatremia and " uremia.  1 of 2 blood cultures 2/15 grew Staph epidermidis and Enterococcus faecalis.  Treated with IV Zosyn and vancomycin initially.  Subsequent blood culture 2/16 has been negative to date.  Leukocytosis resolving.  Has ongoing left toe and foot wounds as well as multiple other pressure sores for which wound care nursing is following.  Infectious disease has been following in the hospital.     -Family in agreement with discontinuing antibiotics and further lab draws.        Chronic anemia, thrombocytopenia, MGUS:  Transfused 1 unit for hemoglobin 6.9 on 2/17, subsequent worsening volume overload.  CBC stable 2/18/2024.    -no further lab draws     History of DVT:  Transitioned to hospice, comfort cares.  Discontinued Coumadin, INR draws.     Lymphedema:  Symptomatic management at this point.     Chronic left foot, right lateral malleoli are wounds with history of left second toe osteomyelitis, recently treated with new/more recent onset pressure ulcers:  As above, discontinued antibiotics, lab draws.    - Continue current wound cares, optimize comfort.  Pain control as above.      History of left common iliac artery aneurysm:             Diet: Advance Diet as Tolerated: Regular Diet Adult    DVT Prophylaxis: Hospice care.   Dominguez Catheter: Not present  Lines: None     Cardiac Monitoring: None  Code Status: No CPR- Do NOT IntubateDNR, DNi    Clinically Significant Risk Factors                  # Hypertension: Noted on problem list            # Financial/Environmental Concerns:     # ICD device present       Disposition Plan         TBD, patient is comfort care and may pass away soon.    Harsh Pineda MD  Hospitalist Service, GOLD TEAM 19  [facility]  Securely message with [employer] (more info)  Text page via [employer] Paging/Directory   See signed in provider for up to date coverage  information  ______________________________________________________________________    Interval History   No significant events.  Resting comfortably with eyes closed.  Family at bedside.    Physical Exam   Vital Signs:       Pulse: 60   Resp: 12 SpO2: 99 % O2 Device: Oxymask    Weight: 0 lbs 0 oz      General Appearance: Resting with eyes closed, nonverbal  Respiratory: Normal work of breathing.   Cardiovascular: RRR  GI: Soft, nondistended  Extremities: Edematous extremities   Neuro: Somnolent     Medical Decision Making       35 MINUTES SPENT BY ME on the date of service doing chart review, history, exam, documentation & further activities per the note.      Data         Imaging results reviewed over the past 24 hrs:   No results found for this or any previous visit (from the past 24 hour(s)).  No lab results found in last 7 days.

## 2024-02-28 NOTE — PLAN OF CARE
"  Problem: Adult Inpatient Plan of Care  Goal: Plan of Care Review  Description: The Plan of Care Review/Shift note should be completed every shift.  The Outcome Evaluation is a brief statement about your assessment that the patient is improving, declining, or no change.  This information will be displayed automatically on your shift  note.  Outcome: Progressing  Flowsheets (Taken 2/27/2024 7806)  Outcome Evaluation: Family refused repositioning this shift writer offered family continues to have pulse ox on per family and oxygen mask at 0.5 L for comfort.  Plan of Care Reviewed With: patient  Overall Patient Progress: no change  Goal: Patient-Specific Goal (Individualized)  Description: You can add care plan individualizations to a care plan. Examples of Individualization might be:  \"Parent requests to be called daily at 9am for status\", \"I have a hard time hearing out of my right ear\", or \"Do not touch me to wake me up as it startles  me\".  Outcome: Progressing  Goal: Absence of Hospital-Acquired Illness or Injury  Outcome: Progressing  Goal: Optimal Comfort and Wellbeing  Outcome: Progressing  Goal: Readiness for Transition of Care  Outcome: Progressing   Goal Outcome Evaluation:      Plan of Care Reviewed With: patient    Overall Patient Progress: no changeOverall Patient Progress: no change    Outcome Evaluation: Family refused repositioning this shift writer offered family continues to have pulse ox on per family and oxygen mask at 0.5 L for comfort.      "

## 2024-02-28 NOTE — PLAN OF CARE
Problem: Adult Inpatient Plan of Care  Goal: Absence of Hospital-Acquired Illness or Injury  Intervention: Identify and Manage Fall Risk  Recent Flowsheet Documentation  Taken 2/28/2024 6539 by Yasir Coreas RN  Safety Promotion/Fall Prevention: safety round/check completed     Problem: End-of-Life Care  Goal: Comfort, Peace and Preserved Dignity  Outcome: Progressing      O2: SpO2 > 98 and stable on oxymask   Output:  Incontinence    Activity:  On bed   Skin:  With wound (son prioritized comfort, wound not assessed as patient does not want his father to be disturbed)   Pain: Pain managed with morphine PCA   CMS:  Eyes closed   Dressing:  On wound    Diet: Regular diet.    LDA: L PIV SL / infusing morphine at 3 mL/hr ; with carrier at 10ml/hr normal saline   L PIV SL   Equipment: IV pole, personal belongings,    Plan: contact precautions maintained / Continue with plan of care. Call light within reach  Plan: comfort cares   Additional Info:  1157 son asked about ativan but refused afterwards   Son REFUSING REPOSITIONING        0320 changed the oxygen humidifier

## 2024-02-28 NOTE — PROGRESS NOTES
[id number]   Patient resting comfortably, Family at the bedside.  No change. PRN ativan given. Family denies repositioning.  Morphine infusing.   Continue with POc

## 2024-02-29 NOTE — CONSULTS
[facility]    Consult Note - [facility]  _________________________________________________________________    [facility] Contact Number: [phone]    - Providers: Please contact [facility] with changes in orders or clinical plan of care   - Nursing: Please contact [facility] with significant changes in patient condition    Hospice will notify the care team (including the hospitalist) to confirm date of inpatient hospice (GIP) admission.    New Epic encounter will not be created until hospice completes admission.   ______________________________________________________________________        Hospice Diagnosis: sepsis    Indication for Inpatient Hospice: pain, agitation    Goals for Hospital Discharge: <3 PRN's in 24 hours for 48 hours, hemodynamically stable for discharge     Plan of Care Discussed with the Following:   - Nurse: Maira WANG   - Hospitalist/Rounding Provider: Harsh Pineda    - Noe's Family/Preferred Contact: Rica BANERJEE  - Hospice Provider: Dr. Katarzyna Landrum    Summary of Visit (includes assessment, medications and any new orders):  VS:   BP: 108/48  P: 68  R: 12  02 sats: 99% on 0.5 L    Patient remains unresponsive, respirations even and non-labored.  SO Rica requesting patient receive scheduled Ativan Q6H.  Patient's son reluctantly agreeing with Rica.    Recommendations from hospice provider: Schedule the Ativan to Q6H and can increase after assessing his tolerance.      Thank you for the attentive and compassionate care provided to this patient!      Patito Stokes, RN

## 2024-02-29 NOTE — CONSULTS
"SPIRITUAL HEALTH SERVICES - Consult Note [facility] ([facility]) 6B     Referral Source/Reason for Visit:  Plan of Care      Summary and Recommendations -  *   Spoke with Sha's partner Rica and provided words of support and encouragement  Rica expressed feeling like she had to apologize that Sha is \"lingering\".  We talked about how it takes as long as it takes and that the whole process of death is not well supported in our culture.  Rica shared she has reached out to the priests at the [facility] and to her own Rabbi and I offered any support I could provide to facilitate their being able to visit Sha.  [facility] remains available on request.        Trena Roblero   Chaplain Resident  Pager [phone]    [facility] available 24/7 for emergent requests/referrals, either by paging the on-call  or by entering an ASAP/STAT consult in [facility], which will also page the on-call .         * [facility] remains available 24/7 for emergent requests/referrals, either by having the switchboard page the on-call  or by entering an ASAP/STAT consult in Epic (this will also page the on-call ). Routine Epic consults receive an initial respon      "

## 2024-02-29 NOTE — PROGRESS NOTES
[facility]    Medicine Progress Note - Hospitalist Service, GOLD TEAM 19    Date of Admission:  2/23/2024    Assessment & Plan      Noe Florence is a 88 year old male admitted on 2/23/2024.     Noe Florence is a 88 year old male with PMH notable for CKD3 (baseline Cr ~1.4-1.8), Afib, CAD with past CABG and stents, ischemic cardiomyopathy, polymorphic V-tach s/p ICD, dementia, monoclonal gammopathy of unknown significance, polyneuropathy, L common iliac aneurysm, polymyalgia rheumatica, past colon cancer, recent treatment for L 2nd toe osteomyelitis. Admitted to [facility] ICU for sepsis, Acute encephalopathy, ARSALAN, and profound hypernatremia, severe dysphagia, severe malnutrition, failure to thrive.  Now in hospice/comfort cares. Inpatient hospice team consulted, following.        Today's changes:     No acute events overnight.  Patient looked comfortable. Patient was very somnolent.  Hospice team following the patient.     Comfort care  -Patient appears to be comfortable.  GIP hospice team following the patient.  Continue on current medical management for now.          Diet: Advance Diet as Tolerated: Regular Diet Adult    DVT Prophylaxis: Hospice care.   Dominguez Catheter: Not present  Lines: None     Cardiac Monitoring: None  Code Status: No CPR- Do NOT IntubateDNR, DNi    Clinically Significant Risk Factors                  # Hypertension: Noted on problem list            # Financial/Environmental Concerns:     # ICD device present       Disposition Plan         TBD, patient is comfort care and may pass away soon.    Harsh Pineda MD  Hospitalist Service, GOLD TEAM 19  [facility]  Securely message with [employer] (more info)  Text page via [employer] Paging/Directory   See signed in provider for up to date coverage information  ______________________________________________________________________    Interval History   No  significant events.  Resting comfortably with eyes closed.  Family at bedside.    Physical Exam   Vital Signs:                    Weight: 0 lbs 0 oz      General Appearance: Resting with eyes closed, nonverbal  Respiratory: Normal work of breathing.   Cardiovascular: RRR  GI: Soft, nondistended  Extremities: Edematous extremities   Neuro: Somnolent     Medical Decision Making       35 MINUTES SPENT BY ME on the date of service doing chart review, history, exam, documentation & further activities per the note.      Data         Imaging results reviewed over the past 24 hrs:   No results found for this or any previous visit (from the past 24 hour(s)).  No lab results found in last 7 days.

## 2024-02-29 NOTE — PROGRESS NOTES
[facility]    Progress Note - [facility]    ______________________________________________________________________    [facility] 24/7 Contact Number: [phone]    - Providers: [facility] with changes in orders or clinical plan of care   - Nursing: Please contact [facility] with significant changes in patient condition  ______________________________________________________________________        Plan of Care Discussed with the Following:   - Nurse: BYRON Rao  - Hospitalist/Rounding Provider: Harsh Pineda MD    - Noe's Family/Preferred Contact: See, son and binta Strauss other  - Hospice Provider: Dr. Katarzyna Landrum    Summary of Visit (includes assessment findings, discharge planning progress/status or any new orders):     Assessed Sha in his hospital room today with significant other Rica and son See at bedside. Rica and See have a hard time communicating kindly to each other. Neither needs anything at this time. I told Rica the ACACIA castaneda will call her.   Sha appears comfortable, O2 is back on and so is the monitor. BP 60/25, O2 99 on 1L O2, P76, R10 with 5-10 seconds of apnea. Lots of arm edema but they are already elevated.     Vocrosana called BYRON Rao, no needs.    Vocera text Dr. Pineda, no needs.     Plan: continue meds    Goal: pain managed AEB no grimace maintained for 48 hours. Monitor for 48 hours to see if hemodynamically stable for discharge. Unlikely given low BP today.        Katleynn Gipson RN

## 2024-02-29 NOTE — TELEPHONE ENCOUNTER
Incoming call from Rica who states that her  is currently inpatient on hospice and will not be needing INR services.   States that someone called her regarding testing INR. Advised that pt was discharged from [facility] on 2/23/24 but could have been someone from In-Home Lab connection. [facility] RN will call in-home labs and update that pt will no longer be needing their services.     Praveen Estrada RN

## 2024-02-29 NOTE — PROGRESS NOTES
[id number]    Plan of Care Reviewed With: patient, family     Overall Patient Progress: no change     Goal Outcome Evaluation: Patient on comfort care, resting comfortably. O2 1 LPM oxy mask, sating >90%. No acute changes noted overnight. Family at bedside, declines turn and repositioning. Call light is within reach.    Goal: Maintain pain management with Morphine drip at 3 mL/hr continuous. Managed skin tear of weeping edema, by elevating sites on pillows. PRN Ativan available Q 3 hrs. Will continue to follow POC.    Problem: End-of-Life Care  Goal: Comfort, Peace and Preserved Dignity  Outcome: Progressing

## 2024-02-29 NOTE — PROGRESS NOTES
[id number]    Pt is on CC. Family at bedside. Family refuses repositioning.    LP IV infusing morphine 3 mg/hr, Continue with POC

## 2024-03-01 NOTE — PROGRESS NOTES
[facility]    Medicine Progress Note - Hospitalist Service, GOLD TEAM 19    Date of Admission:  2/23/2024    Assessment & Plan      Noe Florence is a 88 year old male admitted on 2/23/2024.     Noe Florence is a 88 year old male with PMH notable for CKD3 (baseline Cr ~1.4-1.8), Afib, CAD with past CABG and stents, ischemic cardiomyopathy, polymorphic V-tach s/p ICD, dementia, monoclonal gammopathy of unknown significance, polyneuropathy, L common iliac aneurysm, polymyalgia rheumatica, past colon cancer, recent treatment for L 2nd toe osteomyelitis. Admitted to [facility] ICU for sepsis, Acute encephalopathy, ARSALAN, and profound hypernatremia, severe dysphagia, severe malnutrition, failure to thrive.  Now in hospice/comfort cares. Inpatient hospice team consulted, following.        Today's changes:     No acute events overnight.  Patient looked comfortable again today. Patient was very somnolent.  Hospice team following the patient.  Patient's family at bedside.     Comfort care  -Patient appears to be comfortable.  [facility] hospice team following the patient.  Continue on current medical management for now.          Diet: Advance Diet as Tolerated: Regular Diet Adult    DVT Prophylaxis: Hospice care.   Dominguez Catheter: Not present  Lines: None     Cardiac Monitoring: None  Code Status: No CPR- Do NOT IntubateDNR, DNi    Clinically Significant Risk Factors                  # Hypertension: Noted on problem list            # Financial/Environmental Concerns:     # ICD device present       Disposition Plan         TBD, patient is comfort care and may pass away soon.    Harsh Pineda MD  Hospitalist Service, GOLD TEAM 19  [facility]  Securely message with [employer] (more info)  Text page via [employer] Paging/Directory   See signed in provider for up to date coverage  information  ______________________________________________________________________    Interval History     No significant events.      Physical Exam   Vital Signs:               O2 Device: Oxymask Oxygen Delivery: 1 LPM (for comfort)  Weight: 0 lbs 0 oz  General Appearance: Resting with eyes closed, nonverbal  Respiratory: Normal work of breathing.   Cardiovascular: RRR  GI: Soft, nondistended  Extremities: Edematous extremities   Neuro: Somnolent     Medical Decision Making       35 MINUTES SPENT BY ME on the date of service doing chart review, history, exam, documentation & further activities per the note.      Data         Imaging results reviewed over the past 24 hrs:   No results found for this or any previous visit (from the past 24 hour(s)).  No lab results found in last 7 days.

## 2024-03-01 NOTE — PLAN OF CARE
Problem: End-of-Life Care  Goal: Comfort, Peace and Preserved Dignity  Outcome: Progressing  Intervention: Promote Physical Comfort  Flowsheets  Taken 3/1/2024 1308  Airway/Ventilation Support: comfort measures provided  Sensory Stimulation Regulation:   visual stimulation minimized   visitors limited   care clustered   lighting decreased   auditory stimulation minimized   quiet environment promoted  Environmental Support:   calm environment promoted   comfort object encouraged   personal routine supported   environmental consistency promoted  Taken 3/1/2024 1248  Sensory Stimulation Regulation:   visual stimulation minimized   visitors limited   care clustered   lighting decreased   auditory stimulation minimized   quiet environment promoted  Intervention: Promote Peace and Maintain Dignity  Flowsheets (Taken 3/1/2024 1308)  Spiritual Activities Assistance:   spiritual support provided   hope instilled  Supportive Measures: active listening utilized  Intervention: Support the Grieving Process  Flowsheets  Taken 3/1/2024 1308  Family/Support System Care:   presence promoted   involvement promoted  Taken 3/1/2024 1243  Family/Support System Care:   presence promoted   involvement promoted   Goal Outcome Evaluation:       Pt non-speaking; ABELARDO orientation. 1 L oxy mask for comfort. 1 L PIV infusing cont. Morphine with NS at 10 ml/hr. Pt family refused full skin assessment/general assessment and repositioning. Pt family allowed writer to change linen and incont pads x1 during shift -- son and writer noticed R top of ear has decreased perfusion & bleeding; R shoulder has skin tear, applied Vaseline gauze and 4x4 gauze -- family will allow q2h repo. Pt on comfort cares. Family expresses no needs at this time call light in reach.

## 2024-03-01 NOTE — PROGRESS NOTES
[id number]    Plan of Care Reviewed With: patient, family     Overall Patient Progress: no change    Goal Outcome Evaluation:   Patient appears comfortable, in no distress. On 1 LPM O2. Via oxy mask.  Family has declined  turns and repositioning at this time . No acute changes noted overnight. Family remains at bedside, call light is within reach. Hospice Team is following.    PLAN:   Continue pain management with Morphine drip at 3 mL/hr continuous. Managed skin tear of weeping edema, by elevating sites on pillows. PRN Ativan available Q 3 hrs. Will continue to follow POC.    Problem: End-of-Life Care  Goal: Comfort, Peace and Preserved Dignity  Outcome: Progressing

## 2024-03-02 NOTE — PROGRESS NOTES
[facility]    Progress Note - [facility]    ______________________________________________________________________    [facility] 24/7 Contact Number: [phone]    - Providers: Please contact [facility] with changes in orders or clinical plan of care   - Nursing: Please contact [facility] with significant changes in patient condition  ______________________________________________________________________        Plan of Care Discussed with the Following:   - Nurse: Dilcia Edwards RN  - Hospitalist/Rounding Provider: Not contacted    - Noe's Family/Preferred Contact: Rica, wife  - Hospice Provider: not contacted    Summary of Visit (includes assessment findings, discharge planning progress/status or any new orders):   O2 98%, P85, R10, /43    Bill is resting peacefully in bed, no grimace. Eyes more sunken than last I saw. Edema back in arms and legs. Warm to touch but no pedal pulses felt. Laying on back vs. Was on right side previously. 2 doses prn ativan PO, 1 dose morphine PO PRN in last 24 hours  Provided emotional support to family.     Plan: continue meds     Goal: pain managed AEB no grimace maintained for 48 hours. Monitor for 48 hours to see if hemodynamically stable for discharge. Unlikely given low BP today.     Katelynn Gipson, RN

## 2024-03-02 NOTE — PLAN OF CARE
Problem: End-of-Life Care  Goal: Comfort, Peace and Preserved Dignity  Outcome: Progressing  Intervention: Promote Physical Comfort  Recent Flowsheet Documentation  Taken 3/2/2024 0945 by Dilcia Edwards RN  Environmental Support:   calm environment promoted   caregiver consistency promoted   comfort object encouraged  Intervention: Promote Peace and Maintain Dignity  Recent Flowsheet Documentation  Taken 3/2/2024 0945 by Dilcia Edwards RN  Spiritual Activities Assistance: hope instilled  Supportive Measures:   active listening utilized   verbalization of feelings encouraged  Intervention: Support the Grieving Process  Recent Flowsheet Documentation  Taken 3/2/2024 0945 by Dilcia Edwards RN  Family/Support System Care:   presence promoted   involvement promoted  Life Transition/Adjustment: end-of-life care initiated   Goal Outcome Evaluation:       1 L oxymask for comfort. L upper arm IV infusing cont. Morphine at 3 ml/hr and NS at 10 ml/hr. R ear, shoulder and back skin tear covered with Vaseline gauze. BUE bruises noted. Incontinent bowel and bladder. Check and repo Q2H. No acute events. Family expresses no needs at this time.

## 2024-03-02 NOTE — CONSULTS
[facility]    Consult Note - [facility]  _________________________________________________________________    [facility] 24/7 Contact Number: [phone]    - Providers: Please contact [facility] with changes in orders or clinical plan of care   - Nursing: Please contact [facility] with significant changes in patient condition    Hospice will notify the care team (including the hospitalist) to confirm date of inpatient hospice (GIP) admission.    New Epic encounter will not be created until hospice completes admission.   ______________________________________________________________________       Plan of Care Discussed with the Following:   - Nurse: Dilcia Edwards  - Hospitalist/Rounding Provider: Did not consult    - Noe's Family/Preferred Contact: Son and S.O  - Hospice Provider: Did not consult    Summary of Visit (includes assessment, medications and any new orders):   VS:   BP: 61/47  P: 78  R: 14  02 sats: 99% on 1 L 02/mask.    Patient continues to have 99% 02 sats despite visual decreased perfusion to digits.  Patient having increased edema to bilateral arms, BP now only obtainable on forearm.    Discussed with family possible reasons why patient is still living, but endorsed that every patient is different.  Writer suggested some people wait to die until left alone.  Ventura acknowledged there has been times that patient has been left alone in the room.       Patito Stokes, RN

## 2024-03-02 NOTE — PLAN OF CARE
[id number]    Problem: Adult Inpatient Plan of Care  Goal: Absence of Hospital-Acquired Illness or Injury  Intervention: Identify and Manage Fall Risk  Recent Flowsheet Documentation  Taken 3/2/2024 0000 by Janina Sosa RN  Safety Promotion/Fall Prevention:   assistive device/personal items within reach   clutter free environment maintained   increased rounding and observation   increase visualization of patient   lighting adjusted   room near nurse's station   safety round/check completed   patient and family education     Problem: Adult Inpatient Plan of Care  Goal: Optimal Comfort and Wellbeing  Outcome: Progressing       Problem: End-of-Life Care  Goal: Comfort, Peace and Preserved Dignity  Outcome: Progressing  Intervention: Promote Physical Comfort  Recent Flowsheet Documentation  Taken 3/2/2024 0000 by Janina Sosa RN  Airway/Ventilation Support: comfort measures provided  Environmental Support:   calm environment promoted   comfort object encouraged   personal routine supported   environmental consistency promoted  Intervention: Promote Peace and Maintain Dignity  Recent Flowsheet Documentation  Taken 3/2/2024 0000 by Janina Sosa RN  Supportive Measures: (to family members) active listening utilized  Intervention: Support the Grieving Process  Recent Flowsheet Documentation  Taken 3/2/2024 0000 by Janina Sosa RN  Family/Support System Care:   presence promoted   involvement promoted       Pt. Is with O2 at 1L per oxymask  for comfort. L PIV infusing continuous Morphine with NS at 10ml/hr. Check and change done, .      Goal Outcome Evaluation:    Plan of Care Reviewed With: family

## 2024-03-02 NOTE — PROGRESS NOTES
[facility]    Medicine Progress Note - Hospitalist Service, GOLD TEAM 19    Date of Admission:  2/23/2024    Assessment & Plan      Noe Florence is a 88 year old male admitted on 2/23/2024.     Noe Florence is a 88 year old male with PMH notable for CKD3 (baseline Cr ~1.4-1.8), Afib, CAD with past CABG and stents, ischemic cardiomyopathy, polymorphic V-tach s/p ICD, dementia, monoclonal gammopathy of unknown significance, polyneuropathy, L common iliac aneurysm, polymyalgia rheumatica, past colon cancer, recent treatment for L 2nd toe osteomyelitis. Admitted to [facility] ICU for sepsis, Acute encephalopathy, ARSALAN, and profound hypernatremia, severe dysphagia, severe malnutrition, failure to thrive.  Now in hospice/comfort cares. Inpatient hospice team consulted, following.        Today's changes:     Patient lost IV site today.  IV team was able to replace peripheral IV.  Patient looked comfortable, no significant changes even after morphine infusion was stopped.  Patient's son at bedside.      Comfort care  -Patient appears to be comfortable.  [facility] hospice team following the patient.  Continue on current medical management for now.          Diet: Advance Diet as Tolerated: Regular Diet Adult    DVT Prophylaxis: Hospice care.   Dominguez Catheter: Not present  Lines: None     Cardiac Monitoring: None  Code Status: No CPR- Do NOT IntubateDNR, DNi    Clinically Significant Risk Factors                  # Hypertension: Noted on problem list            # Financial/Environmental Concerns:     # ICD device present       Disposition Plan         TBD, patient is comfort care and may pass away soon.    Harsh Pineda MD  Hospitalist Service, GOLD TEAM 19  [facility]  Securely message with [employer] (more info)  Text page via [facility] Paging/Directory   See signed in provider for up to date coverage  information  ______________________________________________________________________    Interval History     No significant events.      Physical Exam   Vital Signs:                    Weight: 0 lbs 0 oz  General Appearance: Chronically ill, no acute distress, on room air.  Respiratory: Normal work of breathing.   Cardiovascular: RRR  GI: Soft, nondistended  Extremities: Edematous extremities   Neuro: Somnolent     Medical Decision Making       35 MINUTES SPENT BY ME on the date of service doing chart review, history, exam, documentation & further activities per the note.      Data         Imaging results reviewed over the past 24 hrs:   No results found for this or any previous visit (from the past 24 hour(s)).  No lab results found in last 7 days.

## 2024-03-03 NOTE — PROGRESS NOTES
[id number]    Patient on comfort cares, Patient on 1 LPM per oxymask for comfort. Son in room tonight. Infusing continuously Morphine with NS at 3ML/hr. Per family patient is comfortable.

## 2024-03-03 NOTE — PLAN OF CARE
Problem: End-of-Life Care  Goal: Comfort, Peace and Preserved Dignity  Outcome: Progressing  Intervention: Promote Physical Comfort  Recent Flowsheet Documentation  Taken 3/3/2024 1019 by Dilcia Edwards RN  Environmental Support:   calm environment promoted   comfort object encouraged   environmental consistency promoted  Intervention: Promote Peace and Maintain Dignity  Recent Flowsheet Documentation  Taken 3/3/2024 1019 by Dilcia Edwards RN  Spiritual Activities Assistance: hope instilled  Supportive Measures:   verbalization of feelings encouraged   active listening utilized  Intervention: Support the Grieving Process  Recent Flowsheet Documentation  Taken 3/3/2024 1019 by Dilcia Edwards RN  Family/Support System Care:   presence promoted   involvement promoted  Life Transition/Adjustment: end-of-life care initiated   Goal Outcome Evaluation:       ABELARDO orientation; pt not speaking. 1 L oxymask for comfort. Pt on comfort cares. 1 L PIV infusing cont. Morphine at 3 mg with carrier fluid NS at 10 ml/hr. Assist of 2 with repo/changing. Sacrum wound noted covered with mepilex, bilateral elbows covered with mepilex, redness/bruising noted on bilateral arms, R ear and shoulder skin tear covered with Vaseline gauze & bilateral heels and foot wounds covered. Family at bedside. No acute events.

## 2024-03-03 NOTE — PROGRESS NOTES
Pt is on Comfort Care. Patient on 1 LPM per oxymask for comfort. Pt infusing Morphine with NS at 3ml/hr. Per family, patient is comfortable.

## 2024-03-03 NOTE — PROGRESS NOTES
[facility]    Medicine Progress Note - Hospitalist Service, GOLD TEAM 19    Date of Admission:  2/23/2024    Assessment & Plan      Noe Florence is a 88 year old male admitted on 2/23/2024.     Noe Florence is a 88 year old male with PMH notable for CKD3 (baseline Cr ~1.4-1.8), Afib, CAD with past CABG and stents, ischemic cardiomyopathy, polymorphic V-tach s/p ICD, dementia, monoclonal gammopathy of unknown significance, polyneuropathy, L common iliac aneurysm, polymyalgia rheumatica, past colon cancer, recent treatment for L 2nd toe osteomyelitis. Admitted to [facility] ICU for sepsis, Acute encephalopathy, ARSALAN, and profound hypernatremia, severe dysphagia, severe malnutrition, failure to thrive.  Now in hospice/comfort cares. Inpatient hospice team consulted, following.        Today's changes:     New IV site was placed without complications.  No acute events overnight.  Patient's son at bedside this morning.  Patient looked comfortable but I noticed that he was using his respiratory accessory muscles more and had some episodes of apnea while I was in the room.  Also, he is lower and upper extremities felt a little bit colder.  I discussed all of these with his son at bedside.     Comfort care  -Patient appears to be comfortable.  GIP hospice team following the patient.  Continue on current medical management for now.  Patient may pass away soon based on my physical examination from today.  Continue following the patient.          Diet: Advance Diet as Tolerated: Regular Diet Adult    DVT Prophylaxis: Hospice care.   Dominguez Catheter: Not present  Lines: None     Cardiac Monitoring: None  Code Status: No CPR- Do NOT IntubateDNR, DNi    Clinically Significant Risk Factors                  # Hypertension: Noted on problem list            # Financial/Environmental Concerns:     # ICD device present       Disposition Plan         TBD, patient is comfort care and may pass  away soon.    Harsh Pineda MD  Hospitalist Service, GOLD TEAM 19  [facility]  Securely message with [employer] (more info)  Text page via [employer] Paging/Directory   See signed in provider for up to date coverage information  ______________________________________________________________________    Interval History     Acute events as above.    Physical Exam   Vital Signs:                    Weight: 0 lbs 0 oz  General Appearance: Chronically ill, no acute distress, on room air.  Respiratory: Patient using his abdominal muscles to breathe, overall clear lungs to auscultation.  Cardiovascular: RRR  GI: Soft, nondistended  Extremities: Edematous extremities   Neuro: Somnolent     Medical Decision Making       35 MINUTES SPENT BY ME on the date of service doing chart review, history, exam, documentation & further activities per the note.      Data         Imaging results reviewed over the past 24 hrs:   No results found for this or any previous visit (from the past 24 hour(s)).  No lab results found in last 7 days.

## 2024-03-03 NOTE — PROGRESS NOTES
[facility]    Progress Note - [facility]    ______________________________________________________________________    [facility] 24/7 Contact Number: [phone]    - Providers: Please contact [facility] with changes in orders or clinical plan of care   - Nursing: Please contact [facility] with significant changes in patient condition  ______________________________________________________________________        Plan of Care Discussed with the Following:   - Nurse: BYRON Ha  - Hospitalist/Rounding Provider: Harsh Pineda MD    - Noe's Family/Preferred Contact: Sang, son and Rica, sig. other  - Hospice Provider: Dr. Babita Lopez    Summary of Visit (includes assessment findings, discharge planning progress/status or any new orders):   Assessed Sha in his room. He is sleeping in bed with soft face. Sang and Rica are both bedside. Discussed option of scheduling Ativan to help his fear of dying. Rica was for it but Sang was concerned Sha would die as a result of giving the ativan. Provided further education to Sang who was then in agreement with scheduling a low dose.   Spoke to Dr. Whitney Lopez who suggested ativan 0.5mg Q6h IV scheduled. Also suggested dropping saline to 5ml/hr. We know this isn't much of a difference but he's so swollen hopefully it'll help.   Dr. Pineda updated, will make the change today.     Updated Dilcia RN with change. Ok to hold if needed.     Plan: change meds per above    Goal: comfort as evidence by breathing slow and steady within 48 hours    Discharge: monitor for 48 hours to see if hemodynamically stable for discharge, unlikely given today's presentation.        Katelynn Gipson RN

## 2024-03-04 NOTE — PLAN OF CARE
Goal Outcome Evaluation:    [id number]     Please see flowsheets and MAR for pt full assessment     Pt on comfort cares resting comfortably no signs of distress or labored breathing. Family/Son at bedside. Morphine drip 3mL/hr. Pt on 1 lpm via Oxymask O2 is >96%. ativan given x2 . No acute changes on NOC shift.  Call light within reach. Plan of care ongoing WCM      Plan of Care Reviewed With: family    Overall Patient Progress: declining    Omero Leavitt RN

## 2024-03-04 NOTE — PROGRESS NOTES
Comfort Cares  / DNI/DNR    Patient has cool and clammy skin    Irregular pulse,inability to move or turn in bed, difficult or unable to arouse    Increased respiratory rate,  periods of apnea or Cheyne-Molina respiration pattern    Inability to cough or clear secretions, presence of increased secretions ( death rattle )    Decreased and/or no urine output    Patient seems to have transitioned into  last phase of the dying process/ appears to be comfortable     The body appears to be in the process of shutting down.      Morphine drip 3mL/hr /PCA pump  1 liter via Oxymask O2 is >96%.  Ativan given X1/ Family refused 1200 scheduled / Received PRN    Necrosis of the right side of the face/ear, nurse completed  wound care      Patient's son is in the room / ask before repositioning

## 2024-03-04 NOTE — PROGRESS NOTES
[facility]    Medicine Progress Note - Hospitalist Service, GOLD TEAM 19    Date of Admission:  2/23/2024    Assessment & Plan      Noe Florence is a 88 year old male admitted on 2/23/2024.     Noe Florence is a 88 year old male with PMH notable for CKD3 (baseline Cr ~1.4-1.8), Afib, CAD with past CABG and stents, ischemic cardiomyopathy, polymorphic V-tach s/p ICD, dementia, monoclonal gammopathy of unknown significance, polyneuropathy, L common iliac aneurysm, polymyalgia rheumatica, past colon cancer, recent treatment for L 2nd toe osteomyelitis. Admitted to [facility] ICU for sepsis, Acute encephalopathy, ARSALAN, and profound hypernatremia, severe dysphagia, severe malnutrition, failure to thrive.  Now in hospice/comfort cares. Inpatient hospice team consulted, following.        Today's changes:     No acute events overnight.  Hospice team following the patient.  Patient's son at bedside.  Schedule Ativan was added to the comfort care regimen.  Patient looks comfortable this morning.     Comfort care  -Patient appears to be comfortable.  GIP hospice team following the patient.  Continue on current medical management for now including schedule Ativan.  Patient may pass away soon.  Continue following the patient.          Diet: Advance Diet as Tolerated: Regular Diet Adult    DVT Prophylaxis: Hospice care.   Dominguez Catheter: Not present  Lines: None     Cardiac Monitoring: None  Code Status: No CPR- Do NOT IntubateDNR, DNi    Clinically Significant Risk Factors                  # Hypertension: Noted on problem list            # Financial/Environmental Concerns:     # ICD device present       Disposition Plan         TBD, patient is comfort care and may pass away soon.    Harsh Pineda MD  Hospitalist Service, GOLD TEAM 19  [facility]  Securely message with [employer] (more info)  Text page via [employer] Paging/Directory   See  signed in provider for up to date coverage information  ______________________________________________________________________    Interval History     Acute events as above.    Physical Exam   Vital Signs:                    Weight: 0 lbs 0 oz  General Appearance: Chronically ill, no acute distress, on room air.  Respiratory: I noticed more frequent episodes of apnea.  Cardiovascular: RRR  GI: Soft, nondistended  Extremities: Edematous extremities   Neuro: Somnolent     Medical Decision Making       35 MINUTES SPENT BY ME on the date of service doing chart review, history, exam, documentation & further activities per the note.      Data         Imaging results reviewed over the past 24 hrs:   No results found for this or any previous visit (from the past 24 hour(s)).  No lab results found in last 7 days.

## 2024-03-04 NOTE — CONSULTS
[facility]    Consult Note - [facility]  _________________________________________________________________    [facility] 24/7 Contact Number: [phone]    - Providers: Please contact [facility] with changes in orders or clinical plan of care   - Nursing: Please contact [facility] with significant changes in patient condition    Hospice will notify the care team (including the hospitalist) to confirm date of inpatient hospice (GIP) admission.    New Epic encounter will not be created until hospice completes admission.   ______________________________________________________________________        Hospice Diagnosis: sepsis    Indication for Inpatient Hospice: pain, agitation    Goals for Hospital Discharge: <3 PRN's in 24 hours for 48 hours, hemodynamically stable for discharge.     Plan of Care Discussed with the Following:   - Nurse: BYRON Yan  - Hospitalist/Rounding Provider: Dr. Pineda   - Noe's Family/Preferred Contact: spouse, Rica  - Hospice Provider: Dr. Landrum    Summary of Visit (includes assessment, medications and any new orders):   Met with patient, son by the bedside. Patient appears peaceful, ativan was scheduled yesterday. Bedside RN reports necrosis of the right side of the face/ear, not assessed due to patient son saying wound care was just completed.     No new order recommendations at this time.       Charlotte Chavez

## 2024-03-04 NOTE — TELEPHONE ENCOUNTER
Left Voicemail (1st Attempt) and Sent Mychart (1st Attempt)   for the patient to call back and schedule the following:Medical management of iliac aneurysm 5 month follow up.     Appointment type: Return Vas Pt   Provider:    Return date: June 2024  Specialty phone number: [phone]  Additional appointment(s) needed: US Aorta/Ivc/Iliac Duplex Complete   Additonal Notes: N/a  Bryan Whitney on 3/4/2024 at 4:43 PM

## 2024-03-04 NOTE — CONSULTS
SPIRITUAL HEALTH SERVICES - Consult Note [facility] ([facility]) 6B     Referral Source/Reason for Visit:  Plan of Care      Summary and Recommendations -  *   Spoke with Sang (Sha's son) who says he's been at bedside most of this time  Sang shares he has not been working since his father came in  Sang shares he promised his father he would stay by his side, and he has.    Spoke with Sang about self care and taking breaks when other visitors are present  Sang says a niece and a RN friend of the family are coming in today  [facility] will continue to be available on request please place a consult order in Epic         Trena Roblero   Chaplain Resident  Pager [phone]    [facility] available 24/7 for emergent requests/referrals, either by paging the on-call  or by entering an ASAP/STAT consult in [facility], which will also page the on-call .         * [facility] remains available 24/7 for emergent requests/referrals, either by having the switchboard page the on-call  or by entering an ASAP/STAT consult in Epic (this will also page the on-call ). Routine Epic consults receive an initial respon

## 2024-03-05 NOTE — PROGRESS NOTES
[id number]    Pt somnolent, unable to arouse, breathing without difficulty, on morphine drip, 1 L 02 via oximask for comfort, repositioned when family requests, has wound to right ear, wound care done in AM, wound to right shoulder, bruising from IV placement to right arm, left PIV infusing, 10 ml/hr TKO, pt hospice care actively dying, son at bedside overnight tonight. Has scheduled and prn ativan. Allowed oral cares, left ear cushioned with mepilex. Limbs elevated, heels offloaded, has left foot wound wrapped in gauze. Cont to provide emotional support for family as pt transitions.

## 2024-03-05 NOTE — CONSULTS
[facility]    Consult Note - [facility]patient [facility]  _________________________________________________________________    [facility] 24/7 Contact Number: [phone]    - Providers: Please contact [facility] with changes in orders or clinical plan of care   - Nursing: Please contact [facility] with significant changes in patient condition    Hospice will notify the care team (including the hospitalist) to confirm date of inpatient hospice (GIP) admission.    New Epic encounter will not be created until hospice completes admission.   ______________________________________________________________________        Hospice Diagnosis: sepsis    Indication for Inpatient Hospice: pain, agitation    Goals for Hospital Discharge: <3 PRN's in 24 hours for 48 hours,     Plan of Care Discussed with the Following:   - Nurse: BYRON Yan  - Hospitalist/Rounding Provider: Dr. Willson   - Noe's Family/Preferred Contact: spouse, Nicolette  - Hospice Provider: Dr. Landrum    Summary of Visit (includes assessment, medications and any new orders):   Met with patient and family, discussed that generally speaking we discontinue IV fluids when we admit someone to hospice and his spouse also shared concerns that the amount of fluid may be unnecessarily prolonging his life and suffering. Both son and spouse were in agreement to switch to push IV medications instead of continuous.     New orders:   Discontinue morphine 3mg/hr IV continuous  Morphine 10mg Q4hrs IV push scheduled   Morphine 10mg Q2hrs IV push PRN       Charlotte Chavez RN

## 2024-03-05 NOTE — PROGRESS NOTES
Comfort Cares  / DNI/DNR     Patient seems to have transitioned into  last phase of the dying process/ appears to be comfortable     No change to patient's physical condition      Patient is not consuming any food       Inability to move or turn in bed, difficult or unable to arouse     Inability to cough or clear secretions, presence of increased secretions ( death rattle )     No urine or bowel  output     Oral cares      Morphine drip 3mL/hr /PCA pump  1 Liter Oxymask O2     Ativan   Patient received schedule 0600 Am   Son would like 1200 schedule dose to be given later in day     PIV Left arm infusing      Necrosis Left ear,and upper shoulder  nurse  (changed dressings) completed  wound care       Patient's son is in the room / ask before repositioning

## 2024-03-05 NOTE — PLAN OF CARE
No acute changes or concerns during NOC shift. Pt unable to arouse, no signs of SOB. Repositioning at family request, and oxy mask at 1 mL. Continue with POC.

## 2024-03-06 NOTE — PLAN OF CARE
Goal Outcome Evaluation:  Reposition, every 2 hours and checking depends as well, was not wet and have no Bm. Ativan every 6 hours but check with family before taken medication out from pyxis.

## 2024-03-06 NOTE — PLAN OF CARE
"Goal Outcome Evaluation:      Plan of Care Reviewed With: family    Overall Patient Progress: declining  Problem: Adult Inpatient Plan of Care  Goal: Plan of Care Review  Description: The Plan of Care Review/Shift note should be completed every shift.  The Outcome Evaluation is a brief statement about your assessment that the patient is improving, declining, or no change.  This information will be displayed automatically on your shift  note.  Outcome: Progressing  Flowsheets (Taken 3/6/2024 0542)  Outcome Evaluation: Pt appears comfortable this shift, unable to arouse, no s/s of SOB noted. No acute changes or concerns during NOC shift.  Repositioning at family request, and oxy mask at 1 mL. Continue with POC.  Plan of Care Reviewed With: family  Overall Patient Progress: declining  Goal: Patient-Specific Goal (Individualized)  Description: You can add care plan individualizations to a care plan. Examples of Individualization might be:  \"Parent requests to be called daily at 9am for status\", \"I have a hard time hearing out of my right ear\", or \"Do not touch me to wake me up as it startles  me\".  Outcome: Progressing  Goal: Absence of Hospital-Acquired Illness or Injury  Outcome: Progressing  Intervention: Identify and Manage Fall Risk  Recent Flowsheet Documentation  Taken 3/6/2024 0235 by Ana Charlton RN  Safety Promotion/Fall Prevention:   assistive device/personal items within reach   activity supervised   clutter free environment maintained   increased rounding and observation   increase visualization of patient   lighting adjusted   patient and family education   safety round/check completed  Intervention: Prevent Skin Injury  Recent Flowsheet Documentation  Taken 3/6/2024 0400 by Ana Charlton RN  Body Position: turned  Taken 3/6/2024 0200 by Ana Charlton RN  Body Position: right  Taken 3/6/2024 0000 by Ana Charlton RN  Body Position: turned  Taken 3/5/2024 2200 by Ana Charlton, " RN  Body Position: right  Taken 3/5/2024 2000 by Ana Charlton RN  Body Position: turned  Intervention: Prevent Infection  Recent Flowsheet Documentation  Taken 3/6/2024 0235 by Ana Charlton RN  Infection Prevention:   personal protective equipment utilized   rest/sleep promoted   single patient room provided   hand hygiene promoted   equipment surfaces disinfected  Goal: Optimal Comfort and Wellbeing  Outcome: Progressing  Intervention: Monitor Pain and Promote Comfort  Recent Flowsheet Documentation  Taken 3/6/2024 0235 by Ana Charlton RN  Pain Management Interventions: other (see comments)  Intervention: Provide Person-Centered Care  Recent Flowsheet Documentation  Taken 3/6/2024 0235 by Ana Charlton RN  Trust Relationship/Rapport:   care explained   choices provided  Goal: Readiness for Transition of Care  Outcome: Progressing     Problem: End-of-Life Care  Goal: Comfort, Peace and Preserved Dignity  Outcome: Progressing  Intervention: Support the Grieving Process  Recent Flowsheet Documentation  Taken 3/6/2024 0235 by Ana Charlton RN  Family/Support System Care:   presence promoted   involvement promoted    Outcome Evaluation: Pt appears comfortable this shift, unable to arouse, no s/s of SOB noted. No acute changes or concerns during NOC shift.  Repositioning at family request, and oxy mask at 1 mL. Continue with POC.

## 2024-03-06 NOTE — PROGRESS NOTES
[facility]    Medicine Progress Note - Hospitalist Service, GOLD TEAM 19    Date of Admission:  2/23/2024    Assessment & Plan   Noe Florence is a 88 year old male with PMH notable for CKD3 (baseline Cr ~1.4-1.8), Afib, CAD with past CABG and stents, ischemic cardiomyopathy, polymorphic V-tach s/p ICD, dementia, monoclonal gammopathy of unknown significance, polyneuropathy, L common iliac aneurysm, polymyalgia rheumatica, past colon cancer, recent treatment for L 2nd toe osteomyelitis. Admitted to [facility] ICU for sepsis, Acute encephalopathy, ARSALAN, and profound hypernatremia, severe dysphagia, severe malnutrition, failure to thrive.  Now in hospice/comfort cares. Inpatient hospice team consulted, following.      # Comfort care(s)  - Patient appears to be comfortable.  GIP hospice team following the patient.  Continue on current medical management for now including scheduled morphine, Ativan.  Patient may pass away soon.  Continue following the patient.          Diet: Advance Diet as Tolerated: Regular Diet Adult    DVT Prophylaxis: COMFORT CARES  Dominguez Catheter: Not present  Lines: None     Cardiac Monitoring: None  Code Status: No CPR- Do NOT Intubate      Clinically Significant Risk Factors                  # Hypertension: Noted on problem list            # Financial/Environmental Concerns:     # ICD device present       Disposition Plan        Hospice care(s)    Jerry Willson DO, S  Hospitalist Service, GOLD TEAM 19  [facility]  Securely message with [employer] (more info)  Text page via [facility] Paging/Directory   See signed in provider for up to date coverage information  ______________________________________________________________________    Interval History   Unable to obtain meaningful ROS at present.  Patient appears comfortable.  Case discussed with nursing staff.  Case discussed with IP hospice nurse.  Continue  hospice care(s).    Physical Exam   Vital Signs:                    Weight: 0 lbs 0 oz    Patient laying comfortably.  Right leaning preference.  Patient wearing Oxy-mask.  No pain or agitation appreciated.    Medical Decision Making       45 MINUTES SPENT BY ME on the date of service doing chart review, history, exam, documentation & further activities per the note.      Data         Imaging results reviewed over the past 24 hrs:   No results found for this or any previous visit (from the past 24 hour(s)).

## 2024-03-06 NOTE — PLAN OF CARE
Problem: Adult Inpatient Plan of Care  Goal: Absence of Hospital-Acquired Illness or Injury  Intervention: Identify and Manage Fall Risk  Recent Flowsheet Documentation  Taken 3/6/2024 8941 by Yasir Coreas RN  Safety Promotion/Fall Prevention: safety round/check completed     Problem: End-of-Life Care  Goal: Comfort, Peace and Preserved Dignity  Outcome: Progressing      O2: SpO2 > 98 and stable on oxymask   Output:  Incontinence    Activity:  On bed   Skin:  With wound (son prioritized comfort, wound not assessed as patient does not want his father to be disturbed)   Pain: Pain managed with morphine   CMS:  Eyes closed   Dressing:  On wound    Diet: Regular diet.    LDA: L PIV SL / infusing morphine at 3 mL/hr ; with carrier at 10ml/hr normal saline   L PIV SL   Equipment: IV pole, personal belongings,    Plan: contact precautions maintained / Continue with plan of care. Call light within reach  Plan: comfort cares   Additional Info:  on scheduled lorazepam       1500 RR after morphine 10mg IV : 15

## 2024-03-06 NOTE — PROGRESS NOTES
[facility]    Progress Note - [facility]    ______________________________________________________________________    [facility] 24/7 Contact Number: [phone]    - Providers: Please contact [facility] with changes in orders or clinical plan of care   - Nursing: Please contact [facility] with significant changes in patient condition  ______________________________________________________________________        Plan of Care Discussed with the Following:   - Nurse: BYRON Cooley   - Hospitalist/Rounding Provider: Jerry Willson DO    - Noe's Family/Preferred Contact: LAVONNE Strauss, Sang, son  - Hospice Provider: Dr. Katarzyna Landrum     Summary of Visit (includes assessment findings, discharge planning progress/status or any new orders):   Vocera Text Dr. Willson, no needs he seems comfortable.   Vocera Text Nurse Cooley to see if needs, none currently.   VS 97% on 1L o2, 66 HR,   Appears comfortable at rest, spoke to Rica, significant other. Unsure what to do. OK with changing to doses of morphine instead of drip.   We are unsure what he needs to let go. He is just holding on.     Plan: change to yesterdays recommendations: Discontinue morphine 3mg/hr IV continuous  Morphine 10mg Q4hrs IV push scheduled   Morphine 10mg Q2hrs IV push PRN   Please push slowly so we keep the IV intact.    Goal: Goal: comfort as evidence by breathing slow and steady within 48 hours     Discharge: monitor for 48 hours to see if hemodynamically stable for discharge, unlikely given today's presentation    Katelynn Gipson RN

## 2024-03-07 NOTE — PROGRESS NOTES
[facility]    Medicine Progress Note - Hospitalist Service, GOLD TEAM 19    Date of Admission:  2/23/2024    Assessment & Plan   Noe Florence is a 88 year old male with PMH notable for CKD3 (baseline Cr ~1.4-1.8), Afib, CAD with past CABG and stents, ischemic cardiomyopathy, polymorphic V-tach s/p ICD, dementia, monoclonal gammopathy of unknown significance, polyneuropathy, L common iliac aneurysm, polymyalgia rheumatica, past colon cancer, recent treatment for L 2nd toe osteomyelitis. Admitted to [facility] ICU for sepsis, Acute encephalopathy, ARSALAN, and profound hypernatremia, severe dysphagia, severe malnutrition, failure to thrive.  Now in hospice/comfort cares. Inpatient hospice team consulted, following.      # Comfort care(s)  - Patient appears to be comfortable.  GIP hospice team following the patient.  Continue on current medical management for now including scheduled morphine, Ativan.  Patient may pass away soon.  Continue following the patient.          Diet: Advance Diet as Tolerated: Regular Diet Adult    DVT Prophylaxis: Hospice care(s)  Dominguez Catheter: Not present  Lines: None     Cardiac Monitoring: None  Code Status: No CPR- Do NOT Intubate      Clinically Significant Risk Factors                  # Hypertension: Noted on problem list            # Financial/Environmental Concerns:     # ICD device present       Disposition Plan        Hospice care(s)    Jerry Willson DO, S  Hospitalist Service, GOLD TEAM 19  [facility]  Securely message with [employer] (more info)  Text page via [employer] Paging/Directory   See signed in provider for up to date coverage information  ______________________________________________________________________    Interval History   Unable to obtain meaningful ROS at present.  Patient appears comfortable.  Case discussed with nursing staff.  Case discussed with IP hospice nurse.  Case  discussed with family at bedside.  Continue hospice care(s).    Physical Exam   Vital Signs:                    Weight: 0 lbs 0 oz    Patient laying comfortably.  Right leaning preference.  Patient wearing Oxy-mask.  No pain or agitation appreciated.    Medical Decision Making       45 MINUTES SPENT BY ME on the date of service doing chart review, history, exam, documentation & further activities per the note.      Data         Imaging results reviewed over the past 24 hrs:   No results found for this or any previous visit (from the past 24 hour(s)).

## 2024-03-07 NOTE — PROGRESS NOTES
[facility]    Progress Note - [facility]    ______________________________________________________________________    [facility] 24/[facility] Contact Number: [phone]    - Providers: Please contact [facility] with changes in orders or clinical plan of care   - Nursing: Please contact [facility] with significant changes in patient condition  ______________________________________________________________________        Plan of Care Discussed with the Following:   - Nurse: Dilcia Edwards RN  - Hospitalist/Rounding Provider: Jerry Willson DO    - Noe's Family/Preferred Contact: Sang, son  - Hospice Provider: Dr. Katarzyna Landrum    Summary of Visit (includes assessment findings, discharge planning progress/status or any new orders):   Saw Sha in his hospital room with Sang present. He reports Bill grimaces after turning educated on benefits vs risks of turning vs not. Appears comfortable.    Plan: continue current meds. Asked Dr. Willson to stop IV fluids and add saline flush prn to MAR    Goal: Goal: comfort as evidence by breathing slow and steady within 48 hours     Discharge: monitor for 48 hours to see if hemodynamically stable for discharge, unlikely given today's presentation       Katelynn Gipson, RN

## 2024-03-07 NOTE — PLAN OF CARE
Problem: End-of-Life Care  Goal: Comfort, Peace and Preserved Dignity  Outcome: Progressing  Intervention: Promote Physical Comfort  Recent Flowsheet Documentation  Taken 3/7/2024 0957 by Dilcia Edwards RN  Environmental Support:   calm environment promoted   comfort object encouraged   environmental consistency promoted  Intervention: Promote Peace and Maintain Dignity  Recent Flowsheet Documentation  Taken 3/7/2024 0957 by Dilcia Edwards RN  Supportive Measures:   verbalization of feelings encouraged   active listening utilized  Intervention: Support the Grieving Process  Recent Flowsheet Documentation  Taken 3/7/2024 0957 by Dilcia Edwards RN  Family/Support System Care:   presence promoted   involvement promoted  Life Transition/Adjustment: end-of-life care initiated   Goal Outcome Evaluation:       1 L PIV SL. 1 L oxymask for comfort. Comfort cares/hospice. Assist of 2 with repositioning and thelma cares. R ear dried blood noted and R shoulder skin tear, sacrum, bilateral elbows & bilateral foot wounds noted; applied mepilex on R side/back and R posterior calf. No acute events.       Writer reached out to pharmacist Pennie Durant mid shift regarding Epic and not scanning barcode for morphine; pharmacist says the ticket has been submitted for issue and we will have to continue to override.

## 2024-03-07 NOTE — PROGRESS NOTES
Pt has  scheduled morphine (PF) injection 10 mg that the barcode was not recognized to administer.  Pharmacy was contacted and a one time order was placed and barcode couldn't still be recognized. Another call was placed to pharmacy informing them that the problem could not be fixed. Pharmacy gave the go ahead to override the medication. Writer also contacted provider to intervene. Provider did contact pharmacy and later massage writer that everyone was ok to override until the problem can be fixed.

## 2024-03-07 NOTE — PLAN OF CARE
"Goal Outcome Evaluation:      Plan of Care Reviewed With: family    Overall Patient Progress: declining  Problem: Adult Inpatient Plan of Care  Goal: Plan of Care Review  Description: The Plan of Care Review/Shift note should be completed every shift.  The Outcome Evaluation is a brief statement about your assessment that the patient is improving, declining, or no change.  This information will be displayed automatically on your shift  note.  Outcome: Progressing  Flowsheets (Taken 3/7/2024 0609)  Outcome Evaluation: Repositioned, every 2 hours, was not wet and have no Bm. Morphine every 4 hours and Ativan every 6 hours.  Plan of Care Reviewed With: family  Overall Patient Progress: declining  Goal: Patient-Specific Goal (Individualized)  Description: You can add care plan individualizations to a care plan. Examples of Individualization might be:  \"Parent requests to be called daily at 9am for status\", \"I have a hard time hearing out of my right ear\", or \"Do not touch me to wake me up as it startles  me\".  Outcome: Progressing  Goal: Absence of Hospital-Acquired Illness or Injury  Outcome: Progressing  Intervention: Identify and Manage Fall Risk  Recent Flowsheet Documentation  Taken 3/7/2024 0121 by Ana Charlton RN  Safety Promotion/Fall Prevention: safety round/check completed  Goal: Optimal Comfort and Wellbeing  Outcome: Progressing  Goal: Readiness for Transition of Care  Outcome: Progressing     Problem: End-of-Life Care  Goal: Comfort, Peace and Preserved Dignity  Outcome: Progressing       Outcome Evaluation: Repositioned, every 2 hours, was not wet and have no Bm. Morphine every 4 hours and Ativan every 6 hours.      "

## 2024-03-08 NOTE — PROGRESS NOTES
[facility]    Medicine Progress Note - Hospitalist Service, GOLD TEAM 19    Date of Admission:  2/23/2024    Assessment & Plan   Noe Florence is a 88 year old male with PMH notable for CKD3 (baseline Cr ~1.4-1.8), Afib, CAD with past CABG and stents, ischemic cardiomyopathy, polymorphic V-tach s/p ICD, dementia, monoclonal gammopathy of unknown significance, polyneuropathy, L common iliac aneurysm, polymyalgia rheumatica, past colon cancer, recent treatment for L 2nd toe osteomyelitis. Admitted to [facility] ICU for sepsis, Acute encephalopathy, ARSALAN, and profound hypernatremia, severe dysphagia, severe malnutrition, failure to thrive.  Now in hospice/comfort cares. Inpatient hospice team consulted, following.      # Comfort care(s)  - Patient appears to be comfortable.  GIP hospice team following the patient.  Continue on current medical management for now including scheduled morphine, Ativan.  Patient may pass away soon.  Continue following the patient.          Diet: Advance Diet as Tolerated: Regular Diet Adult    DVT Prophylaxis: Hospice care(s)  Dominguez Catheter: Not present  Lines: None     Cardiac Monitoring: None  Code Status: No CPR- Do NOT Intubate      Clinically Significant Risk Factors                  # Hypertension: Noted on problem list            # Financial/Environmental Concerns:     # ICD device present       Disposition Plan        Hospice care(s)    Jerry Willson DO, S  Hospitalist Service, GOLD TEAM 19  [facility]  Securely message with [employer] (more info)  Text page via [employer] Paging/Directory   See signed in provider for up to date coverage information  ______________________________________________________________________    Interval History   Unable to obtain meaningful ROS at present.  Patient appears comfortable.  Case discussed with nursing staff.  Case discussed with IP hospice nurse.  Case  discussed with family at bedside.  Continue hospice care(s).    Physical Exam   Vital Signs:                    Weight: 0 lbs 0 oz    Patient laying comfortably.  Right leaning preference.  Patient wearing Oxy-mask.  No pain or agitation appreciated.    Medical Decision Making       45 MINUTES SPENT BY ME on the date of service doing chart review, history, exam, documentation & further activities per the note.      Data         Imaging results reviewed over the past 24 hrs:   No results found for this or any previous visit (from the past 24 hour(s)).

## 2024-03-08 NOTE — PROGRESS NOTES
[facility]    Medicine Progress Note - Hospitalist Service, GOLD TEAM 19    Date of Admission:  2/23/2024    Assessment & Plan   Noe Florence is a 88 year old male with PMH notable for CKD3 (baseline Cr ~1.4-1.8), Afib, CAD with past CABG and stents, ischemic cardiomyopathy, polymorphic V-tach s/p ICD, dementia, monoclonal gammopathy of unknown significance, polyneuropathy, L common iliac aneurysm, polymyalgia rheumatica, past colon cancer, recent treatment for L 2nd toe osteomyelitis. Admitted to [facility] ICU for sepsis, Acute encephalopathy, ARSALAN, and profound hypernatremia, severe dysphagia, severe malnutrition, failure to thrive.  Now in hospice/comfort cares. Inpatient hospice team consulted, following.      # Comfort care(s)  - Patient appears to be comfortable.  GIP hospice team following the patient.  Continue on current medical management for now including scheduled morphine, Ativan.  Patient may pass away soon.  Continue following the patient.          Diet: Advance Diet as Tolerated: Regular Diet Adult    DVT Prophylaxis: Hospice care(s)  Dominguez Catheter: Not present  Lines: None     Cardiac Monitoring: None  Code Status: No CPR- Do NOT Intubate      Clinically Significant Risk Factors                  # Hypertension: Noted on problem list            # Financial/Environmental Concerns:     # ICD device present       Disposition Plan        Hospice care(s)    Jerry Willson DO, S  Hospitalist Service, GOLD TEAM 19  [facility]  Securely message with [employer] (more info)  Text page via [employer] Paging/Directory   See signed in provider for up to date coverage information  ______________________________________________________________________    Interval History   Unable to obtain meaningful ROS at present.  Patient appears comfortable.  Case discussed with nursing staff.  Case discussed with IP hospice nurse.  Case  discussed with family at bedside.  Continue hospice care(s).    Physical Exam   Vital Signs:                    Weight: 0 lbs 0 oz    Patient laying comfortably.  Right leaning preference.  Patient wearing Oxy-mask.  No pain or agitation appreciated.    Medical Decision Making       45 MINUTES SPENT BY ME on the date of service doing chart review, history, exam, documentation & further activities per the note.      Data         Imaging results reviewed over the past 24 hrs:   No results found for this or any previous visit (from the past 24 hour(s)).

## 2024-03-08 NOTE — PROGRESS NOTES
[id number]  No acute changes this shift. Pt. Turned and repositioned during shift. Morphine and ativan given this shift.Partner at bedside. Morphine was override once in [place] due to technical difficulties Pharmacy was called and notified. Continue POC.

## 2024-03-08 NOTE — PROGRESS NOTES
[facility]    Progress Note - [facility]    ______________________________________________________________________    [facility] 24/7 Contact Number: [phone]    - Providers: Please contact [facility] with changes in orders or clinical plan of care   - Nursing: Please contact [facility] with significant changes in patient condition  ______________________________________________________________________        Plan of Care Discussed with the Following:   - Nurse: Nicci Gavin RN  - Hospitalist/Rounding Provider: Jerry Willson DO    - Noe's Family/Preferred Contact: Sang, son  - Hospice Provider: Dr. Katarzyna Landrum    Summary of Visit (includes assessment findings, discharge planning progress/status or any new orders):   Saw Bill in his hospital room with Sang present. He has no concerns at this time. No changes made today, pt seems comfortable    Plan: continue current meds. No changes needed to be made at this time.     Goal: Goal: comfort as evidence by breathing slow and steady within 48 hours no PRN's given in the past 24hrs     Discharge: monitor for 48 hours to see if hemodynamically stable for discharge, unlikely given today's presentation, pt vitally seems unstable for discharge at this time        Katelynn Gipson, RN

## 2024-03-08 NOTE — PROGRESS NOTES
End of shift Summary: See flowsheet for VS and detail assessments.     Changes this Shift:      Pulmonary: Pt denies SOB & chest pain. Pt is on 1 LPM O2 via oxymask.     Diet: Regular diet (not eating), medication given through PIV.    Output: Pt is incontinent, LBM unknown.      Activity: Pt is on bedrest, repo's as needed.     Skin: Skin tear to R shoulder, redness to coccyx, see chart.     Pain: ABELARDO pt's pain.     Neuro/CMS: Unable to assess A/O, numbness and tingling..     Dressings/Drains: Multiple dressings/wounds see chart.     IV: L PIV saline locked.     Additional info: Contact precautions maintained. No significant changes on shift.     Plan:  Plan of care ongoing.

## 2024-03-09 NOTE — CONSULTS
"[facility]    Consult Note - [facility]  _________________________________________________________________    [facility] 24/7 Contact Number: [phone]    - Providers: Please contact [facility] with changes in orders or clinical plan of care   - Nursing: Please contact [facility] with significant changes in patient condition    Hospice will notify the care team (including the hospitalist) to confirm date of inpatient hospice (GIP) admission.    New Epic encounter will not be created until hospice completes admission.   ______________________________________________________________________        Hospice Diagnosis: sepsis    Indication for Inpatient Hospice: pain, agitation    Goals for Hospital Discharge: <3 PRN's in 24 hours for 48 hours, hemodynamically stable for discharge    Plan of Care Discussed with the Following:   - Nurse: BYRON Grajeda  - Hospitalist/Rounding Provider: Dr. Willson  - Noe's Family/Preferred Contact: spouse, Rica  - Hospice Provider: Dr. Katarzyna Landrum    Summary of Visit (includes assessment, medications and any new orders):   Met with patient and son Sang by the bedside, discussed potential existential reasons that his dad may be holding on for so long and Sang said he keeps reassuring his dad that \"he's going to go to [facility].\" RR 7, irregular and shallow. Educated on what to expect as far as respiratory changes going forward.     No new order recommendations at this time.       Charlotte Chavez   "

## 2024-03-09 NOTE — PLAN OF CARE
Goal Outcome Evaluation:      Plan of Care Reviewed With: family    Overall Patient Progress: declining    Outcome Evaluation: Patient on comfort cares/hospice. Pain managed by morphine Q 4 hr and ativan Q 6 hr. No changes. Continue with plan of care.

## 2024-03-09 NOTE — PROGRESS NOTES
[id number]    Plan of Care Reviewed With: patient, family     Overall Patient Progress: no change     Goal Outcome Evaluation:   Patient appears comfortable, in no distress. On 1 LPM O2. Via oxy mask.  Turns and repositioning for comfort. No acute changes noted overnight. Family remains at bedside, call light is within reach. Hospice Team is following patient.     PLAN:   Continue pain management with IV Morphine q 4 hrs, and Ativan available Q 6 hrs and PRN Q 3 hrs. Will continue to follow POC.

## 2024-03-10 NOTE — PROGRESS NOTES
[facility]    Medicine Progress Note - Hospitalist Service, GOLD TEAM 19    Date of Admission:  2/23/2024    Assessment & Plan   Noe Florence is a 88 year old male with PMH notable for CKD3 (baseline Cr ~1.4-1.8), Afib, CAD with past CABG and stents, ischemic cardiomyopathy, polymorphic V-tach s/p ICD, dementia, monoclonal gammopathy of unknown significance, polyneuropathy, L common iliac aneurysm, polymyalgia rheumatica, past colon cancer, recent treatment for L 2nd toe osteomyelitis. Admitted to [facility] ICU for sepsis, Acute encephalopathy, ARSALAN, and profound hypernatremia, severe dysphagia, severe malnutrition, failure to thrive.  Now in hospice/comfort cares. Inpatient hospice team consulted, following.      # Comfort care(s)  - Patient appears to be comfortable.  GIP hospice team following the patient.  Continue on current medical management for now including scheduled morphine, Ativan.  Patient may pass away soon.  Continue following the patient.          Diet: Advance Diet as Tolerated: Regular Diet Adult    DVT Prophylaxis: Hospice care(s)  Dominguez Catheter: Not present  Lines: None     Cardiac Monitoring: None  Code Status: No CPR- Do NOT Intubate      Clinically Significant Risk Factors                  # Hypertension: Noted on problem list            # Financial/Environmental Concerns:     # ICD device present       Disposition Plan        Hospice care(s)    Jerry Willson DO, S  Hospitalist Service, GOLD TEAM 19  [facility]  Securely message with [employer] (more info)  Text page via [employer] Paging/Directory   See signed in provider for up to date coverage information  ______________________________________________________________________    Interval History   Unable to obtain meaningful ROS at present.  Patient appears comfortable.  Case discussed with nursing staff.  Case discussed with IP hospice nurse.  Case  discussed with family at bedside.  Continue hospice care(s).    Physical Exam   Vital Signs:                    Weight: 0 lbs 0 oz    Patient laying comfortably.  Right leaning preference.  Patient wearing Oxy-mask.  No pain or agitation appreciated.    Medical Decision Making       45 MINUTES SPENT BY ME on the date of service doing chart review, history, exam, documentation & further activities per the note.      Data         Imaging results reviewed over the past 24 hrs:   No results found for this or any previous visit (from the past 24 hour(s)).

## 2024-03-10 NOTE — DISCHARGE SUMMARY
[facility]    Death Summary - Hospitalist Service, GOLD TEAM 19     Date of Admission:  2/23/2024  Date of Death: 3/10/2024  Discharging Provider: Antione Arnett MD    Discharge Diagnoses   Failure to thrive  Severe malnutrition  Sepsis  Acute kidney injury    Cause of death: Failure to thrive    Hospital Course   Noe Florence is a 88 year old male with PMH notable for CKD3 (baseline Cr ~1.4-1.8), Afib, CAD with past CABG and stents, ischemic cardiomyopathy, polymorphic V-tach s/p ICD, dementia, monoclonal gammopathy of unknown significance, polyneuropathy, L common iliac aneurysm, polymyalgia rheumatica, past colon cancer, recent treatment for L 2nd toe osteomyelitis. Admitted to [facility] ICU for sepsis, Acute encephalopathy, ARSALAN, and profound hypernatremia, severe dysphagia, severe malnutrition, failure to thrive.  Now in hospice/comfort cares. Inpatient hospice team consulted, following. Called to patient room by nurse to declare patient dead. Exam in separate note from same date of service.     Antione Arnett MD  [facility]  ______________________________________________________________________      Significant Results and Procedures   Most Recent 3 CBC's:  Recent Labs   Lab Test 02/18/24  0553 02/17/24  0420 02/16/24  0526   WBC 11.1* 12.8* 13.4*   HGB 8.4* 6.9* 7.5*   MCV 98 98 100   * 126* 127*   ,   Results for orders placed or performed during the hospital encounter of 02/15/24   XR Chest Port 1 View    Narrative    XR CHEST PORT 1 VIEW  2/15/2024 1:15 PM     HISTORY:  dyspnea       COMPARISON:  12/11/2023 CXR    TECHNIQUE: Portable, semiupright, frontal projection radiograph of the  chest.    FINDINGS:   Implantable cardiac defibrillator projects over the left upper chest  with stable positioning of right atrial lead and right ventricular  shock coil. Mitral valve prosthesis. Postsurgical  changes of the chest  with median sternotomy wires intact.    Cardiac mediastinal silhouette is stable. The trachea is midline. No  appreciable pneumothorax. Streaky opacities of the lower lungs and  perihilar regions. No focal pulmonary consolidations.     Visualized upper abdomen is unremarkable. No acute osseous  abnormalities.      Impression    IMPRESSION:  Unchanged perihilar and bibasilar streaky/patchy opacity. No  appreciable pneumothorax or acute pulmonary consolidations.    I have personally reviewed the examination and initial interpretation  and I agree with the findings.    AFIA DANIELS MD         SYSTEM ID:  [id number]   Head CT w/o contrast    Narrative    CT HEAD W/O CONTRAST 2/15/2024 2:11 PM    History: AMS     Comparison: Head CT from 9/11/2016.    Technique: Using multidetector thin collimation helical acquisition  technique, axial, coronal and sagittal CT images from the skull base  to the vertex were obtained without intravenous contrast.   (topogram) image(s) also obtained and reviewed.    Findings:   Images are degraded aided by motion artifact.  There is no intracranial hemorrhage, mass effect, or midline shift.  Gray/white matter differentiation in both cerebral hemispheres is  preserved. Ventricles are proportionate to the cerebral sulci.  Moderate generalized parenchymal volume loss. White matter hyper  attenuation, most likely represents chronic small vessel ischemic  disease. The right perirolandic chronic insult (series 3 image 16).  The basal cisterns are clear. Arterial consultations.    The bony calvaria and the bones of the skull base are normal. The  visualized portions of the paranasal sinuses are clear. Right mastoid  and middle ear cavity effusion. Bilateral pseudophakia.      Impression    Impression:  1. No acute intracranial pathology.   2. Moderate generalized parenchymal volume loss and leukoaraiosis.   3. Right perirolandic chronic insult.  4. Right mastoid and  middle ear cavity effusion.    THOMAS MCHUGH MD         SYSTEM ID:  [id number]   US Renal Complete Non-Vascular    Narrative    EXAMINATION: US RENAL COMPLETE NON-VASCULAR, 2/16/2024 8:21 AM     COMPARISON: CT abdomen and pelvis dated 12/6/2023.    HISTORY: ARSALAN    TECHNIQUE: The kidneys and bladder were scanned in the standard  fashion with specialized ultrasound transducer(s) using both gray  scale and limited color/spectral Doppler techniques.    FINDINGS:    Right kidney: Measures 11.1 cm in length. Parenchyma is of normal  thickness and echogenicity. No focal mass. No hydronephrosis.    Left kidney: Measures 10 cm in length. Parenchyma is of normal  thickness and echogenicity. 1.9 x 1.7 x 1.8 cm anechoic cyst noted in  the upper pole. No hydronephrosis.     Bladder: Decompressed by Dominguez.      Impression    IMPRESSION:  1.  No hydronephrosis.  2.  Simple left renal cyst.    DORIS BUSTAMANTE MD         SYSTEM ID:  [id number]   XR Abdomen Port 1 View    Narrative    EXAMINATION:  XR ABDOMEN PORT 1 VIEW 2/16/2024 5:39 PM     COMPARISON: 12/11/2023.    HISTORY: NG placement.    TECHNIQUE: Frontal view of the abdomen.    FINDINGS: No enteric tube is visualized. No abnormally dilated loops  of bowel. No pneumatosis or portal venous gas. Prominent infected  rectal stool. Left hip fixation hardware.       Impression    IMPRESSION: No enteric tube is visualized.     Findings were discussed with Matt Dunn RN by Dr. Hernandes at 9:10  PM on 2/16/2024. Patient does not currently have an NG tube. Recent  unsuccessful placement attempt.    I have personally reviewed the examination and initial interpretation  and I agree with the findings.    AFIA DANIELS MD         SYSTEM ID:  [id number]   XR Chest Port 1 View    Narrative    Examination:  XR CHEST PORT 1 VIEW    Date:  2/18/2024 9:54 AM     Clinical Information: assess for aspiration pneumonitis     Comparison: 2/15/2024.    Findings:   Single AP chest radiograph. Stable  left chest ICD. Postsurgical  changes of the chest with intact median sternotomy. Mitral valve  prosthesis. Trachea is midline. Stable cardiomediastinal silhouette.  Low lung volumes with slightly increased mixed perihilar, retrocardiac  and bibasilar opacities. No significant pleural effusion. No  appreciable pneumothorax. Osteopenic bones. Upper abdomen is normal.      Impression    Impression:  Low lung volumes with slightly increased mixed perihilar and  retrocardiac opacities, favor edema/atelectasis.     DORIS BUSTAMANTE MD         SYSTEM ID:  [id number]   Echo Complete     Value    LVEF  45-50% (mildly reduced)    [facility]    [id number]  [id number]  [id number]  [id number]^JULIO CESAR^JENNIFER^THOMAS     [facility],[place]  Echocardiography Laboratory  [address] [place] [id number]     Name: LEROY MACHADO  MRN: [id number]  : 1935  Study Date: 02/15/2024 02:55 PM  Age: 88 yrs  Gender: Male  Patient Location: [facility]  Reason For Study: Acute Myocardial Infarction  Ordering Physician: JENNIFER HARRELL  Performed By: Jessica Cedeno     BSA: 1.8 m2  Height: 67 in  Weight: 155 lb  HR: 62  BP: 126/80 mmHg  ______________________________________________________________________________  Procedure  Complete Portable Echo Adult. Contrast Optison. Optison (NDC #7192-2848-33)  given intravenously. Patient was given 6 ml mixture of 3 ml Optison and 6 ml  saline. 3 ml wasted.  ______________________________________________________________________________  Interpretation Summary  Left ventricular cavity size is small. Left ventricular function is decreased.  The ejection fraction is 45-50% (mildly reduced).  Global right ventricular function is mildly reduced.  An annuloplasty ring is noted in the mitral position. There i no stenosis or  regurgitation.  Estimated mean right atrial pressure is normal.  No pericardial effusion is present.      ______________________________________________________________________________  Left Ventricle  Left ventricular cavity size is small. Mild to moderate concentric wall  thickening consistent with left ventricular hypertrophy is present. Left  ventricular function is decreased. The ejection fraction is 45-50% (mildly  reduced).     Right Ventricle  The right ventricle is normal size. Global right ventricular function is  mildly reduced. A pacemaker lead is noted in the right ventricle.     Atria  The right atria appears normal. Mild left atrial enlargement is present.     Mitral Valve  An annuloplasty ring is noted in the mitral position. The mean gradient across  the mitral valve is 2 mmHg.     Aortic Valve  The aortic valve is tricuspid. Mild aortic valve calcification is present.     Tricuspid Valve  Mild tricuspid insufficiency is present. The right ventricular systolic  pressure is approximated at 23.0 mmHg plus the right atrial pressure.  Pulmonary artery systolic pressure is normal.     Pulmonic Valve  On Doppler interrogation, there is no significant stenosis or regurgitation.     Vessels  The aorta root is normal. The inferior vena cava was normal in size with  preserved respiratory variability. Estimated mean right atrial pressure is  normal.     Pericardium  No pericardial effusion is present.     ______________________________________________________________________________  MMode/2D Measurements & Calculations  IVSd: 1.4 cm  LVIDd: 4.1 cm  LVIDs: 2.8 cm  LVPWd: 1.3 cm  FS: 31.7 %     LV mass(C)d: 203.9 grams  LV mass(C)dI: 112.4 grams/m2  Ao root diam: 3.4 cm  asc Aorta Diam: 3.1 cm  LVOT diam: 2.3 cm  LVOT area: 4.1 cm2  Ao root diam index Ht(cm/m): 2.0  Ao root diam index BSA (cm/m2): 1.9  Asc Ao diam index BSA (cm/m2): 1.7  Asc Ao diam index Ht(cm/m): 1.8  LA Volume (BP): 71.8 ml  LA Volume Index (BP): 39.7 ml/m2     RWT: 0.61     Doppler Measurements & Calculations  MV E max katy: 69.7 cm/sec  MV A  max gabe: 111.1 cm/sec  MV E/A: 0.63  MV dec slope: 253.8 cm/sec2  MV dec time: 0.27 sec  Ao V2 max: 111.0 cm/sec  Ao max P.9 mmHg  Ao V2 mean: 61.0 cm/sec  Ao mean P.7 mmHg  Ao V2 VTI: 18.0 cm  MARIA VICTORIA(I,D): 4.5 cm2  MARIA VICTORIA(V,D): 3.6 cm2  LV V1 max PG: 3.8 mmHg  LV V1 max: 97.5 cm/sec  LV V1 VTI: 19.6 cm  SV(LVOT): 80.6 ml  SI(LVOT): 44.4 ml/m2  PA acc time: 0.09 sec  TR max gabe: 240.0 cm/sec  TR max P.0 mmHg  AV Gabe Ratio (DI): 0.88  MARIA VICTORIA Index (cm2/m2): 2.5  E/E' avg: 10.0  Lateral E/e': 9.4  Medial E/e': 10.7     ______________________________________________________________________________  Report approved by: Evelio Delaney 02/15/2024 05:24 PM           *Note: Due to a large number of results and/or encounters for the requested time period, some results have not been displayed. A complete set of results can be found in Results Review.       Consultations This Hospital Stay   PHARMACY IP CONSULT  SPIRITUAL HEALTH SERVICES IP CONSULT  SPIRITUAL HEALTH SERVICES IP CONSULT  NURSING TO CONSULT FOR VASCULAR ACCESS CARE IP CONSULT  SPIRITUAL HEALTH SERVICES IP CONSULT  PHARMACY IP CONSULT    Primary Care Physician   Roberto Sarmiento    Time Spent on this Encounter   I, Antione Arnett MD, personally saw the patient today and spent greater than 30 minutes discharging this patient.

## 2024-03-10 NOTE — PLAN OF CARE
[id number]      Plan of Care Reviewed With: family    Overall Patient Progress: decliningOverall Patient Progress: declining    Pt appears comfortable.  around 0040. Provider notified to confirm death. Son at bedside. Significant Other arrived at 0140. Family requested a ;  consult placed.   in with family. Pt remains still in 646. Security started nishant is full, working on pickup as soon as possible.

## 2024-03-10 NOTE — DEATH PRONOUNCEMENT
MD DEATH PRONOUNCEMENT    Called to pronounce Noe Florence dead.    Physical Exam: Unresponsive to noxious stimuli, Spontaneous respirations absent, Breath sounds absent, Carotid pulse absent, Heart sounds absent, Pupillary light reflex absent, and Corneal blink reflex absent    Patient was pronounced dead at 00:40 AM, March 10, 2024.    Preliminary Cause of Death: Failure to thrive in adult       Principal Problem:    Hospice care       Infectious disease present?: NO    Communicable disease present? (examples: HIV, chicken pox, TB, Ebola, CJD) :  NO    Multi-drug resistant organism present? (example: MRSA): YES, MRSA    Please consider an autopsy if any of the following exist:  NO Unexpected or unexplained death during or following any dental, medical, or surgical diagnostic treatment procedures.   NO Death of mother at or up to seven days after delivery.     NO All  and pediatric deaths.     NO Death where the cause is sufficiently obscure to delay completion of the death certificate.   NO Deaths in which autopsy would confirm a suspected illness/condition that would affect surviving family members or recipients of transplanted organs.     The following deaths must be reported to the 's Office:  NO A death that may be due entirely or in part to any factors other than natural disease (recent surgery, recent trauma, suspected abuse/neglect).   NO A death that may be an accident, suicide, or homicide.     NO Any sudden, unexpected death in which there is no prior history of significant heart disease or any other condition associated with sudden death.   NO A death under suspicious, unusual, or unexpected circumstances.    NO Any death which is apparently due to natural causes but in which the  does not have a personal physician familiar with the patient s medical history, social, or environmental situation or the circumstances of the terminal event.   NO Any death apparently due to Sudden  Infant Death Syndrome.     NO Deaths that occur during, in association with, or as consequences of a diagnostic, therapeutic, or anesthetic procedure.   NO Any death in which a fracture of a major bone has occurred within the past (6) six months.   NO A death of persons note seen by their physician within 120 days of demise.     NO Any death in which the  was an inmate of a public institution or was in the custody of Law Enforcement personnel.   NO  All unexpected deaths of children   NO Solid organ donors   NO Unidentified bodies   NO Deaths of persons whose bodies are to be cremated or otherwise disposed of so that the bodies will later be unavailable for examination;   NO Deaths unattended by a physician outside of a licensed healthcare facility or licensed residential hospice program   NO Deaths occurring within 24 hours of arrival to a health care facility if death is unexpected.    NO Deaths associated with the decedent s employment.   NO Deaths attributed to acts of terrorism.   NO Any death in which there is uncertainty as to whether it is a medical examiner s care should be discussed with the medical investigator.        Body disposition: Autopsy was discussed with family member:  Son in person.  Permission for autopsy was declined.  Body released to the  home.

## 2024-03-10 NOTE — CONSULTS
"SPIRITUAL HEALTH SERVICES Consult Note  [facility] ([facility]) 6B    Visit with Sha's life partner Rica and son See per Sha's end of life. Rica shared that Sha was Religious, and that she is Yazidi and Christopher is \"nothing really\". Rica requested prayers for Sha, per his robust august. I facilitated memory sharing and provided emotional support as they processed the final weeks of Sah's life and bid him farewell. I read from [facility] and we shared prayer. It was an honor to support this family as they say their goodbyes to their beloved Sha.     Ximena Alvarado M.Div.  Chaplain Resident  Pager [phone]  [employer]ach[employer] via [employer]    * [facility] remains available 24/7 for emergent requests/referrals, either by having the switchboard page the on-call  or by entering an ASAP/STAT consult in Epic (this will also page the on-call ). Routine Epic consults receive an initial response within 24 hours.*    "

## 2025-03-05 NOTE — TELEPHONE ENCOUNTER
Bilateral LE venous comp duplex : 11/27/17  1. Right leg: Incompetence of right common femoral vein, residual right great saphenous vein in the proximal and mid thigh and incompetent right saphenofemoral junction. The great saphenous vein dimensions in this location may be from 2 to 6 mm. Incompetent  vein measuring 4 mm in the leg four centimeter from the  medial malleolus, which connects to a competent tributary of the great saphenous vein. No varicose veins. Incidental right popliteal cyst.     2. Left leg: Incompetence of the common femoral vein, femoral vein in the distal thigh and popliteal vein. Incompetent saphenofemoral junction, greater saphenous vein in the proximal calf. The diameter of  the greater saphenous vein about the SFJ varies from 4.9 to 7.2 mm and 3 to 4.4 mm in the region of the knee/proximal calf. 2 incompetent perforators in the lower leg, measuring up to 2.7 and 2.3 cm. No varicose veins.    Bilateral LE arterial duplex : 11/13/17  1. Right leg: No evidence of hemodynamically significant stenosis.  2. Left leg: Mildly elevated velocities in the mid SFA up to 211 cm/s, indicating near 50% stenosis with biphasic distal waveforms. Abnormal waveforms in the CFA are likely related to venous contamination given lack of significant disease on the comparison CT from 9/26/17, and relatively normal distal waveforms.      DELFINO no exercise : 11/13/17  Right leg: Resting DELFINO is 1.50, abnormal, >1.40 (Inconclusive severity). This is likely related to calcified vessels as noted on prior radiographs.     Left leg: Resting DELFINO is abnormal, >1.40 (Inconclusive severity). This is likely related to calcified vessels as noted on prior radiographs.     [FreeTextEntry1] : S/P Rt. Tympanomastoidectomy and OCR, here for f/u: - Cerumen removed from right ear - F/U 6 months. w/ audio

## (undated) DEVICE — ENDO TROCAR FIRST ENTRY KII FIOS Z-THRD 05X100MM CTF03

## (undated) DEVICE — SUCTION IRR STRYKERFLOW II W/TIP 250-070-520

## (undated) DEVICE — SOL WATER IRRIG 3000ML BAG 2B7117

## (undated) DEVICE — SU DERMABOND ADVANCED .7ML DNX12

## (undated) DEVICE — DRAPE POUCH IRR 1016

## (undated) DEVICE — TUBING INSUFFLATION W/FILTER CPC TO LUER 620-030-301

## (undated) DEVICE — GLOVE PROTEXIS W/NEU-THERA 7.5  2D73TE75

## (undated) DEVICE — PAD DEFIBRILLATOR ELECTRODE 4.25INX6IN ADULT 2001M-C

## (undated) DEVICE — SU PDS II 3-0 FS-1 27" CLR  Z442H

## (undated) DEVICE — GLOVE PROTEXIS POWDER FREE SMT 6.5  2D72PT65X

## (undated) DEVICE — DRAPE IOBAN ISOLATION VERTICAL 320X21CM 6617

## (undated) DEVICE — DRAPE C-ARM W/STRAPS 42X72" 07-CA104

## (undated) DEVICE — KIT PATIENT POSITIONING PIGAZZI LATEX FREE 40580

## (undated) DEVICE — SU VICRYL 0 CT-1 36" J946H

## (undated) DEVICE — SOL NACL 0.9% IRRIG 1000ML BOTTLE 2F7124

## (undated) DEVICE — PACK AB HYST II

## (undated) DEVICE — STPL SKIN 35W ROTATING HEAD PRW35

## (undated) DEVICE — PREP DURAPREP REMOVER 4OZ 8611

## (undated) DEVICE — DRAPE IOBAN INCISE 23X17" 6650EZ

## (undated) DEVICE — ENDO TROCAR SLEEVE KII Z-THREADED 05X100MM CTS02

## (undated) DEVICE — SUCTION MANIFOLD DORNOCH ULTRA CART UL-CL500

## (undated) DEVICE — DRSG ADAPTIC 3X16"  6114

## (undated) DEVICE — SU MONOCRYL 4-0 PS-2 27" UND Y426H

## (undated) DEVICE — LINEN TOWEL PACK X30 5481

## (undated) DEVICE — DRAPE MAYO STAND 23X54 8337

## (undated) DEVICE — BLADE CLIPPER SGL USE 9680

## (undated) DEVICE — DRAPE SHEET MED 44X70" 9355

## (undated) DEVICE — DRSG GAUZE 4X4" TRAY 6939

## (undated) DEVICE — JELLY LUBRICATING SURGILUBE 2OZ TUBE

## (undated) DEVICE — DRAPE U SPLIT 74X120" 29440

## (undated) DEVICE — ENDO TROCAR BLUNT TIP KII BALLOON 12X100MM C0R47

## (undated) DEVICE — PREP CHLORAPREP 26ML TINTED ORANGE  260815

## (undated) DEVICE — SU VICRYL 0 UR-6 27" J603H

## (undated) DEVICE — UNIVERSITY PACEMAKER PACK

## (undated) DEVICE — LINEN TOWEL PACK X6 WHITE 5487

## (undated) DEVICE — PREP POVIDONE IODINE SOLUTION 10% 4OZ

## (undated) DEVICE — SU VICRYL 3-0 SH 8X18" UND J864D

## (undated) DEVICE — PREP DURAPREP 26ML APL 8630

## (undated) DEVICE — DRSG ABDOMINAL 07 1/2X8" 7197D

## (undated) DEVICE — SU PDS II 1 CTX 36" Z371T

## (undated) DEVICE — DRAPE LEGGINGS CLEAR 8430

## (undated) DEVICE — ESU GROUND PAD ADULT W/CORD E7507

## (undated) DEVICE — STPL LINEAR CUT 100MM TLC10

## (undated) DEVICE — Device

## (undated) DEVICE — SU VICRYL 2-0 CT-1 27" UND J259H

## (undated) DEVICE — WIPES FOLEY CARE SURESTEP PROVON DFC100

## (undated) DEVICE — STPL LINEAR 90 X 3.5MM TA9035S

## (undated) DEVICE — SOL WATER IRRIG 1000ML BOTTLE 2F7114

## (undated) DEVICE — BALL NOSE GUIDE WIRE

## (undated) DEVICE — ANTIFOG SOLUTION W/FOAM PAD 31142527

## (undated) DEVICE — PROTECTOR ARM ONE-STEP TRENDELENBURG 40418

## (undated) DEVICE — ESU LIGASURE SEALER/DIVIDER RETRACT L-HOOK 37CM LF5637

## (undated) DEVICE — ENDO SCOPE WARMER SEAL  C3101

## (undated) DEVICE — DRAPE STERI U 1015

## (undated) DEVICE — GLOVE PROTEXIS BLUE W/NEU-THERA 8.0  2D73EB80

## (undated) DEVICE — DRAPE CONVERTORS U-DRAPE 60X72" 8476

## (undated) DEVICE — DRAPE SHEET REV FOLD 3/4 9349

## (undated) RX ORDER — LIDOCAINE HYDROCHLORIDE 20 MG/ML
INJECTION, SOLUTION EPIDURAL; INFILTRATION; INTRACAUDAL; PERINEURAL
Status: DISPENSED
Start: 2019-02-01

## (undated) RX ORDER — SODIUM CHLORIDE, SODIUM LACTATE, POTASSIUM CHLORIDE, CALCIUM CHLORIDE 600; 310; 30; 20 MG/100ML; MG/100ML; MG/100ML; MG/100ML
INJECTION, SOLUTION INTRAVENOUS
Status: DISPENSED
Start: 2019-03-14

## (undated) RX ORDER — LIDOCAINE HYDROCHLORIDE 20 MG/ML
INJECTION, SOLUTION INFILTRATION; PERINEURAL
Status: DISPENSED
Start: 2018-12-11

## (undated) RX ORDER — GLYCOPYRROLATE 0.2 MG/ML
INJECTION, SOLUTION INTRAMUSCULAR; INTRAVENOUS
Status: DISPENSED
Start: 2019-03-14

## (undated) RX ORDER — FENTANYL CITRATE 50 UG/ML
INJECTION, SOLUTION INTRAMUSCULAR; INTRAVENOUS
Status: DISPENSED
Start: 2019-02-01

## (undated) RX ORDER — DEXAMETHASONE SODIUM PHOSPHATE 4 MG/ML
INJECTION, SOLUTION INTRA-ARTICULAR; INTRALESIONAL; INTRAMUSCULAR; INTRAVENOUS; SOFT TISSUE
Status: DISPENSED
Start: 2019-03-14

## (undated) RX ORDER — LIDOCAINE HYDROCHLORIDE 20 MG/ML
INJECTION, SOLUTION EPIDURAL; INFILTRATION; INTRACAUDAL; PERINEURAL
Status: DISPENSED
Start: 2019-03-14

## (undated) RX ORDER — EPHEDRINE SULFATE 50 MG/ML
INJECTION, SOLUTION INTRAMUSCULAR; INTRAVENOUS; SUBCUTANEOUS
Status: DISPENSED
Start: 2019-03-14

## (undated) RX ORDER — PHENYLEPHRINE HCL IN 0.9% NACL 1 MG/10 ML
SYRINGE (ML) INTRAVENOUS
Status: DISPENSED
Start: 2019-02-01

## (undated) RX ORDER — GABAPENTIN 300 MG/1
CAPSULE ORAL
Status: DISPENSED
Start: 2019-02-01

## (undated) RX ORDER — PROPOFOL 10 MG/ML
INJECTION, EMULSION INTRAVENOUS
Status: DISPENSED
Start: 2019-03-14

## (undated) RX ORDER — FENTANYL CITRATE 50 UG/ML
INJECTION, SOLUTION INTRAMUSCULAR; INTRAVENOUS
Status: DISPENSED
Start: 2018-11-07

## (undated) RX ORDER — TRIAMCINOLONE ACETONIDE 40 MG/ML
INJECTION, SUSPENSION INTRA-ARTICULAR; INTRAMUSCULAR
Status: DISPENSED
Start: 2023-01-01

## (undated) RX ORDER — GLYCOPYRROLATE 0.2 MG/ML
INJECTION, SOLUTION INTRAMUSCULAR; INTRAVENOUS
Status: DISPENSED
Start: 2019-02-01

## (undated) RX ORDER — CLINDAMYCIN PHOSPHATE 900 MG/50ML
INJECTION, SOLUTION INTRAVENOUS
Status: DISPENSED
Start: 2018-12-11

## (undated) RX ORDER — ONDANSETRON 2 MG/ML
INJECTION INTRAMUSCULAR; INTRAVENOUS
Status: DISPENSED
Start: 2019-03-14

## (undated) RX ORDER — NEOSTIGMINE METHYLSULFATE 1 MG/ML
VIAL (ML) INJECTION
Status: DISPENSED
Start: 2019-03-14

## (undated) RX ORDER — CEFAZOLIN SODIUM 2 G/100ML
INJECTION, SOLUTION INTRAVENOUS
Status: DISPENSED
Start: 2019-03-14

## (undated) RX ORDER — SIMETHICONE 20 MG/.3ML
EMULSION ORAL
Status: DISPENSED
Start: 2018-11-07

## (undated) RX ORDER — LIDOCAINE HYDROCHLORIDE 10 MG/ML
INJECTION, SOLUTION EPIDURAL; INFILTRATION; INTRACAUDAL; PERINEURAL
Status: DISPENSED
Start: 2023-01-01

## (undated) RX ORDER — ONDANSETRON 2 MG/ML
INJECTION INTRAMUSCULAR; INTRAVENOUS
Status: DISPENSED
Start: 2019-02-01

## (undated) RX ORDER — FENTANYL CITRATE 50 UG/ML
INJECTION, SOLUTION INTRAMUSCULAR; INTRAVENOUS
Status: DISPENSED
Start: 2019-03-14

## (undated) RX ORDER — GABAPENTIN 100 MG/1
CAPSULE ORAL
Status: DISPENSED
Start: 2019-03-14

## (undated) RX ORDER — KETAMINE HCL IN 0.9 % NACL 50 MG/5 ML
SYRINGE (ML) INTRAVENOUS
Status: DISPENSED
Start: 2019-03-14

## (undated) RX ORDER — ESMOLOL HYDROCHLORIDE 10 MG/ML
INJECTION INTRAVENOUS
Status: DISPENSED
Start: 2019-02-01

## (undated) RX ORDER — PROPOFOL 10 MG/ML
INJECTION, EMULSION INTRAVENOUS
Status: DISPENSED
Start: 2019-02-01

## (undated) RX ORDER — BUPIVACAINE HYDROCHLORIDE AND EPINEPHRINE 5; 5 MG/ML; UG/ML
INJECTION, SOLUTION EPIDURAL; INTRACAUDAL; PERINEURAL
Status: DISPENSED
Start: 2019-03-14

## (undated) RX ORDER — ERTAPENEM 1 G/1
INJECTION, POWDER, LYOPHILIZED, FOR SOLUTION INTRAMUSCULAR; INTRAVENOUS
Status: DISPENSED
Start: 2019-02-01

## (undated) RX ORDER — PHENYLEPHRINE HCL IN 0.9% NACL 1 MG/10 ML
SYRINGE (ML) INTRAVENOUS
Status: DISPENSED
Start: 2019-03-14

## (undated) RX ORDER — ACETAMINOPHEN 325 MG/1
TABLET ORAL
Status: DISPENSED
Start: 2019-03-14

## (undated) RX ORDER — ACETAMINOPHEN 325 MG/1
TABLET ORAL
Status: DISPENSED
Start: 2019-02-01

## (undated) RX ORDER — ALBUTEROL SULFATE 90 UG/1
AEROSOL, METERED RESPIRATORY (INHALATION)
Status: DISPENSED
Start: 2019-03-14

## (undated) RX ORDER — FENTANYL CITRATE 50 UG/ML
INJECTION, SOLUTION INTRAMUSCULAR; INTRAVENOUS
Status: DISPENSED
Start: 2018-12-11